# Patient Record
Sex: FEMALE | Race: WHITE | NOT HISPANIC OR LATINO | Employment: OTHER | URBAN - METROPOLITAN AREA
[De-identification: names, ages, dates, MRNs, and addresses within clinical notes are randomized per-mention and may not be internally consistent; named-entity substitution may affect disease eponyms.]

---

## 2017-04-06 ENCOUNTER — HOSPITAL ENCOUNTER (EMERGENCY)
Facility: MEDICAL CENTER | Age: 47
End: 2017-04-06
Payer: MEDICAID

## 2017-04-06 VITALS
SYSTOLIC BLOOD PRESSURE: 119 MMHG | RESPIRATION RATE: 20 BRPM | DIASTOLIC BLOOD PRESSURE: 98 MMHG | HEIGHT: 63 IN | OXYGEN SATURATION: 98 % | TEMPERATURE: 98.1 F | WEIGHT: 131.84 LBS | HEART RATE: 91 BPM | BODY MASS INDEX: 23.36 KG/M2

## 2017-04-06 LAB — EKG IMPRESSION: NORMAL

## 2017-04-06 PROCEDURE — 302449 STATCHG TRIAGE ONLY (STATISTIC)

## 2017-04-06 PROCEDURE — 93005 ELECTROCARDIOGRAM TRACING: CPT

## 2017-04-06 ASSESSMENT — PAIN SCALES - GENERAL: PAINLEVEL_OUTOF10: 4

## 2017-04-06 NOTE — ED NOTES
"Mary Martinez  46 y.o.  Chief Complaint   Patient presents with   • Chest Pain     since yesterday. Pt was sleeping when the pain came on, pt states \"it feels like needles jabbing at my chest and arm\"      Pt taken back for EKG  "

## 2017-04-07 DIAGNOSIS — Z01.812 PRE-OPERATIVE LABORATORY EXAMINATION: ICD-10-CM

## 2017-04-07 LAB
APPEARANCE UR: CLEAR
BILIRUB UR QL STRIP.AUTO: NEGATIVE
COLOR UR: NORMAL
CULTURE IF INDICATED INDCX: NO UA CULTURE
GLUCOSE UR STRIP.AUTO-MCNC: NEGATIVE MG/DL
KETONES UR STRIP.AUTO-MCNC: NEGATIVE MG/DL
LEUKOCYTE ESTERASE UR QL STRIP.AUTO: NEGATIVE
MICRO URNS: NORMAL
NITRITE UR QL STRIP.AUTO: NEGATIVE
PH UR STRIP.AUTO: 6.5 [PH]
PROT UR QL STRIP: NEGATIVE MG/DL
RBC UR QL AUTO: NEGATIVE
SP GR UR STRIP.AUTO: 1.01

## 2017-04-07 PROCEDURE — 81003 URINALYSIS AUTO W/O SCOPE: CPT

## 2017-04-07 RX ORDER — OXYCODONE AND ACETAMINOPHEN 7.5; 325 MG/1; MG/1
1 TABLET ORAL EVERY 4 HOURS PRN
Status: ON HOLD | COMMUNITY
End: 2017-06-05

## 2017-04-07 RX ORDER — ZOLPIDEM TARTRATE 5 MG/1
5 TABLET ORAL NIGHTLY PRN
COMMUNITY
End: 2018-06-08

## 2017-04-17 ENCOUNTER — HOSPITAL ENCOUNTER (OUTPATIENT)
Facility: MEDICAL CENTER | Age: 47
End: 2017-04-17
Attending: ORTHOPAEDIC SURGERY | Admitting: ORTHOPAEDIC SURGERY
Payer: MEDICAID

## 2017-04-17 VITALS
WEIGHT: 125 LBS | TEMPERATURE: 98.4 F | SYSTOLIC BLOOD PRESSURE: 100 MMHG | HEART RATE: 102 BPM | HEIGHT: 63 IN | RESPIRATION RATE: 16 BRPM | DIASTOLIC BLOOD PRESSURE: 77 MMHG | OXYGEN SATURATION: 96 % | BODY MASS INDEX: 22.15 KG/M2

## 2017-04-17 PROBLEM — M06.9 RA (RHEUMATOID ARTHRITIS) (HCC): Status: ACTIVE | Noted: 2017-04-17

## 2017-04-17 PROCEDURE — A9270 NON-COVERED ITEM OR SERVICE: HCPCS

## 2017-04-17 PROCEDURE — 500126 HCHG BOVIE, NEEDLE TIP: Performed by: ORTHOPAEDIC SURGERY

## 2017-04-17 PROCEDURE — 160035 HCHG PACU - 1ST 60 MINS PHASE I: Performed by: ORTHOPAEDIC SURGERY

## 2017-04-17 PROCEDURE — 160048 HCHG OR STATISTICAL LEVEL 1-5: Performed by: ORTHOPAEDIC SURGERY

## 2017-04-17 PROCEDURE — 110382 HCHG SHELL REV 271: Performed by: ORTHOPAEDIC SURGERY

## 2017-04-17 PROCEDURE — 700111 HCHG RX REV CODE 636 W/ 250 OVERRIDE (IP)

## 2017-04-17 PROCEDURE — 500127 HCHG BOVIE, NEEDLE TIP 1: Performed by: ORTHOPAEDIC SURGERY

## 2017-04-17 PROCEDURE — 500137 HCHG BURR, CUTTING DISP: Performed by: ORTHOPAEDIC SURGERY

## 2017-04-17 PROCEDURE — 160009 HCHG ANES TIME/MIN: Performed by: ORTHOPAEDIC SURGERY

## 2017-04-17 PROCEDURE — 501480 HCHG STOCKINETTE: Performed by: ORTHOPAEDIC SURGERY

## 2017-04-17 PROCEDURE — 700102 HCHG RX REV CODE 250 W/ 637 OVERRIDE(OP)

## 2017-04-17 PROCEDURE — 160028 HCHG SURGERY MINUTES - 1ST 30 MINS LEVEL 3: Performed by: ORTHOPAEDIC SURGERY

## 2017-04-17 PROCEDURE — 500088 HCHG BLADE, SAGITTAL: Performed by: ORTHOPAEDIC SURGERY

## 2017-04-17 PROCEDURE — 501745 HCHG WIRE, SURGICAL STEEL: Performed by: ORTHOPAEDIC SURGERY

## 2017-04-17 PROCEDURE — 160036 HCHG PACU - EA ADDL 30 MINS PHASE I: Performed by: ORTHOPAEDIC SURGERY

## 2017-04-17 PROCEDURE — 500881 HCHG PACK, EXTREMITY: Performed by: ORTHOPAEDIC SURGERY

## 2017-04-17 PROCEDURE — A6223 GAUZE >16<=48 NO W/SAL W/O B: HCPCS | Performed by: ORTHOPAEDIC SURGERY

## 2017-04-17 PROCEDURE — 501838 HCHG SUTURE GENERAL: Performed by: ORTHOPAEDIC SURGERY

## 2017-04-17 PROCEDURE — 502240 HCHG MISC OR SUPPLY RC 0272: Performed by: ORTHOPAEDIC SURGERY

## 2017-04-17 PROCEDURE — 501736 HCHG WIRE, K-5M DBL END: Performed by: ORTHOPAEDIC SURGERY

## 2017-04-17 PROCEDURE — 700101 HCHG RX REV CODE 250

## 2017-04-17 PROCEDURE — 88304 TISSUE EXAM BY PATHOLOGIST: CPT

## 2017-04-17 PROCEDURE — A4606 OXYGEN PROBE USED W OXIMETER: HCPCS | Performed by: ORTHOPAEDIC SURGERY

## 2017-04-17 PROCEDURE — 110371 HCHG SHELL REV 272: Performed by: ORTHOPAEDIC SURGERY

## 2017-04-17 PROCEDURE — 160002 HCHG RECOVERY MINUTES (STAT): Performed by: ORTHOPAEDIC SURGERY

## 2017-04-17 PROCEDURE — 88311 DECALCIFY TISSUE: CPT

## 2017-04-17 PROCEDURE — 160039 HCHG SURGERY MINUTES - EA ADDL 1 MIN LEVEL 3: Performed by: ORTHOPAEDIC SURGERY

## 2017-04-17 DEVICE — IMPLANTABLE DEVICE: Type: IMPLANTABLE DEVICE | Status: FUNCTIONAL

## 2017-04-17 DEVICE — WIRE K- SMOOTH .045 - (3TX6=18): Type: IMPLANTABLE DEVICE | Status: FUNCTIONAL

## 2017-04-17 RX ORDER — DIPHENHYDRAMINE HYDROCHLORIDE 50 MG/ML
25 INJECTION INTRAMUSCULAR; INTRAVENOUS EVERY 6 HOURS PRN
Status: DISCONTINUED | OUTPATIENT
Start: 2017-04-17 | End: 2017-04-17 | Stop reason: HOSPADM

## 2017-04-17 RX ORDER — DEXAMETHASONE SODIUM PHOSPHATE 4 MG/ML
4 INJECTION, SOLUTION INTRA-ARTICULAR; INTRALESIONAL; INTRAMUSCULAR; INTRAVENOUS; SOFT TISSUE
Status: DISCONTINUED | OUTPATIENT
Start: 2017-04-17 | End: 2017-04-17 | Stop reason: HOSPADM

## 2017-04-17 RX ORDER — BUPIVACAINE HYDROCHLORIDE AND EPINEPHRINE 2.5; 5 MG/ML; UG/ML
INJECTION, SOLUTION EPIDURAL; INFILTRATION; INTRACAUDAL; PERINEURAL
Status: DISCONTINUED
Start: 2017-04-17 | End: 2017-04-17 | Stop reason: HOSPADM

## 2017-04-17 RX ORDER — ONDANSETRON 2 MG/ML
INJECTION INTRAMUSCULAR; INTRAVENOUS
Status: DISCONTINUED
Start: 2017-04-17 | End: 2017-04-17 | Stop reason: HOSPADM

## 2017-04-17 RX ORDER — BACITRACIN 50000 [IU]/1
INJECTION, POWDER, FOR SOLUTION INTRAMUSCULAR
Status: DISCONTINUED | OUTPATIENT
Start: 2017-04-17 | End: 2017-04-17 | Stop reason: HOSPADM

## 2017-04-17 RX ORDER — CELECOXIB 200 MG/1
CAPSULE ORAL
Status: COMPLETED
Start: 2017-04-17 | End: 2017-04-17

## 2017-04-17 RX ORDER — ACETAMINOPHEN 500 MG
TABLET ORAL
Status: COMPLETED
Start: 2017-04-17 | End: 2017-04-17

## 2017-04-17 RX ORDER — MIDAZOLAM HYDROCHLORIDE 1 MG/ML
INJECTION INTRAMUSCULAR; INTRAVENOUS
Status: DISCONTINUED
Start: 2017-04-17 | End: 2017-04-17 | Stop reason: HOSPADM

## 2017-04-17 RX ORDER — GABAPENTIN 300 MG/1
CAPSULE ORAL
Status: COMPLETED
Start: 2017-04-17 | End: 2017-04-17

## 2017-04-17 RX ORDER — SODIUM CHLORIDE, SODIUM LACTATE, POTASSIUM CHLORIDE, CALCIUM CHLORIDE 600; 310; 30; 20 MG/100ML; MG/100ML; MG/100ML; MG/100ML
1000 INJECTION, SOLUTION INTRAVENOUS
Status: COMPLETED | OUTPATIENT
Start: 2017-04-17 | End: 2017-04-17

## 2017-04-17 RX ORDER — OXYCODONE HCL 20 MG/1
TABLET, FILM COATED, EXTENDED RELEASE ORAL
Status: COMPLETED
Start: 2017-04-17 | End: 2017-04-17

## 2017-04-17 RX ORDER — BUPIVACAINE HYDROCHLORIDE AND EPINEPHRINE 2.5; 5 MG/ML; UG/ML
INJECTION, SOLUTION EPIDURAL; INFILTRATION; INTRACAUDAL; PERINEURAL
Status: DISCONTINUED | OUTPATIENT
Start: 2017-04-17 | End: 2017-04-17 | Stop reason: HOSPADM

## 2017-04-17 RX ORDER — HALOPERIDOL 5 MG/ML
1 INJECTION INTRAMUSCULAR EVERY 6 HOURS PRN
Status: DISCONTINUED | OUTPATIENT
Start: 2017-04-17 | End: 2017-04-17 | Stop reason: HOSPADM

## 2017-04-17 RX ORDER — ONDANSETRON 2 MG/ML
4 INJECTION INTRAMUSCULAR; INTRAVENOUS EVERY 4 HOURS PRN
Status: DISCONTINUED | OUTPATIENT
Start: 2017-04-17 | End: 2017-04-17 | Stop reason: HOSPADM

## 2017-04-17 RX ORDER — BACITRACIN 50000 [IU]/1
INJECTION, POWDER, FOR SOLUTION INTRAMUSCULAR
Status: DISCONTINUED
Start: 2017-04-17 | End: 2017-04-17 | Stop reason: HOSPADM

## 2017-04-17 RX ORDER — SCOLOPAMINE TRANSDERMAL SYSTEM 1 MG/1
1 PATCH, EXTENDED RELEASE TRANSDERMAL
Status: DISCONTINUED | OUTPATIENT
Start: 2017-04-17 | End: 2017-04-17 | Stop reason: HOSPADM

## 2017-04-17 RX ADMIN — CELECOXIB 400 MG: 200 CAPSULE ORAL at 10:27

## 2017-04-17 RX ADMIN — ACETAMINOPHEN 500 MG: 500 TABLET, FILM COATED ORAL at 10:27

## 2017-04-17 RX ADMIN — GABAPENTIN 600 MG: 300 CAPSULE ORAL at 10:27

## 2017-04-17 RX ADMIN — OXYCODONE HYDROCHLORIDE 20 MG: 20 TABLET, FILM COATED, EXTENDED RELEASE ORAL at 10:30

## 2017-04-17 RX ADMIN — ONDANSETRON 4 MG: 2 INJECTION INTRAMUSCULAR; INTRAVENOUS at 13:18

## 2017-04-17 RX ADMIN — SODIUM CHLORIDE, SODIUM LACTATE, POTASSIUM CHLORIDE, CALCIUM CHLORIDE 1000 ML: 600; 310; 30; 20 INJECTION, SOLUTION INTRAVENOUS at 09:15

## 2017-04-17 ASSESSMENT — PAIN SCALES - GENERAL
PAINLEVEL_OUTOF10: 0
PAINLEVEL_OUTOF10: 3
PAINLEVEL_OUTOF10: 0

## 2017-04-17 NOTE — OR SURGEON
Immediate Post-Operative Note      PreOp Diagnosis: severe RA    PostOp Diagnosis: same    Procedure(s):  FINGER ARTHROPLASTY - LONG, RING AND SMALL METACARPOPHALANGEAL IMPLANT - Wound Class: Clean    Surgeon(s):  Lobo Rosen M.D.    Anesthesiologist/Type of Anesthesia:  Anesthesiologist: Moe Toledo M.D./General    Surgical Staff:  Circulator: Jamaica Baker R.N.  Relief Circulator: Leah Cifuentes R.N.  Scrub Person: Anitra Colbert; Marianna Jennings; Jerilyn Cadena    Specimen: yes    Estimated Blood Loss: 0    Findings: 0    Complications: 0        4/17/2017 1:08 PM Lobo Rosen

## 2017-04-17 NOTE — DISCHARGE INSTRUCTIONS
ACTIVITY: Rest and take it easy for the first 24 hours.  A responsible adult is recommended to remain with you during that time.  It is normal to feel sleepy.  We encourage you to not do anything that requires balance, judgment or coordination.    MILD FLU-LIKE SYMPTOMS ARE NORMAL. YOU MAY EXPERIENCE GENERALIZED MUSCLE ACHES, THROAT IRRITATION, HEADACHE AND/OR SOME NAUSEA.    FOR 24 HOURS DO NOT:  Drive, operate machinery or run household appliances.  Drink beer or alcoholic beverages.   Make important decisions or sign legal documents.    SPECIAL INSTRUCTIONS: follow Dr. Rosen's instructions.  See Hand Surgery instruction sheet provided with discharge instructions.      DIET: To avoid nausea, slowly advance diet as tolerated, avoiding spicy or greasy foods for the first day.  Add more substantial food to your diet according to your physician's instructions.  Babies can be fed formula or breast milk as soon as they are hungry.  INCREASE FLUIDS AND FIBER TO AVOID CONSTIPATION.    SURGICAL DRESSING/BATHING: Keep hand dressing clean, and dry and in place until instructed to remove by MD. Keep hand elevated with pillows.     FOLLOW-UP APPOINTMENT:  A follow-up appointment should be arranged with your doctor in 2 weeks; call to schedule.    You should CALL YOUR PHYSICIAN if you develop:  Fever greater than 101 degrees F.  Pain not relieved by medication, or persistent nausea or vomiting.  Excessive bleeding (blood soaking through dressing) or unexpected drainage from the wound.  Extreme redness or swelling around the incision site, drainage of pus or foul smelling drainage.  Inability to urinate or empty your bladder within 8 hours.  Problems with breathing or chest pain.    You should call 911 if you develop problems with breathing or chest pain.  If you are unable to contact your doctor or surgical center, you should go to the nearest emergency room or urgent care center.  Physician's telephone #:  [phone]    If any questions arise, call your doctor.  If your doctor is not available, please feel free to call the Surgical Center at [phone].  The Center is open Monday through Friday from 7AM to 7PM.  You can also call the HEALTH HOTLINE open 24 hours/day, 7 days/week and speak to a nurse at [phone], or toll free at [phone].    A registered nurse may call you a few days after your surgery to see how you are doing after your procedure.    MEDICATIONS: Resume taking daily medication.  Take prescribed pain medication with food.  If no medication is prescribed, you may take non-aspirin pain medication if needed.  PAIN MEDICATION CAN BE VERY CONSTIPATING.  Take a stool softener or laxative such as senokot, pericolace, or milk of magnesia if needed.    Prescription given for at home.  Last pain medication given at take first dose pain medication when sensation returns and you feel mild pain to right arm.    If your physician has prescribed pain medication that includes Acetaminophen (Tylenol), do not take additional Acetaminophen (Tylenol) while taking the prescribed medication.    Depression / Suicide Risk    As you are discharged from this [facility] facility, it is important to learn how to keep safe from harming yourself.    Recognize the warning signs:  · Abrupt changes in personality, positive or negative- including increase in energy   · Giving away possessions  · Change in eating patterns- significant weight changes-  positive or negative  · Change in sleeping patterns- unable to sleep or sleeping all the time   · Unwillingness or inability to communicate  · Depression  · Unusual sadness, discouragement and loneliness  · Talk of wanting to die  · Neglect of personal appearance   · Rebelliousness- reckless behavior  · Withdrawal from people/activities they love  · Confusion- inability to concentrate     If you or a loved one observes any of these behaviors or has concerns about self-harm,  here's what you can do:  · Talk about it- your feelings and reasons for harming yourself  · Remove any means that you might use to hurt yourself (examples: pills, rope, extension cords, firearm)  · Get professional help from the community (Mental Health, Substance Abuse, psychological counseling)  · Do not be alone:Call your Safe Contact- someone whom you trust who will be there for you.  · Call your local CRISIS HOTLINE 459-3853 or 230-106-2316  · Call your local Children's Mobile Crisis Response Team [place] (496) 074-6822 or www.[employer]  · Call the toll free National Suicide Prevention Hotlines   · National Suicide Prevention Lifeline 271-687-ZDFO (8566)  · National Hope Line Network 800-SUICIDE (820-6234)

## 2017-04-17 NOTE — IP AVS SNAPSHOT
4/17/2017    Mary Martinez  [address] [address]    Dear Mary:    [facility] wants to ensure your discharge home is safe and you or your loved ones have had all of your questions answered regarding your care after you leave the hospital.    Below is a list of resources and contact information should you have any questions regarding your hospital stay, follow-up instructions, or active medical symptoms.    Questions or Concerns Regarding… Contact   Medical Questions Related to Your Discharge  (7 days a week, 8am-5pm) Contact a Nurse Care Coordinator   [phone]   Medical Questions Not Related to Your Discharge  (24 hours a day / 7 days a week)  Contact the Nurse Health Line   [phone]    Medications or Discharge Instructions Refer to your discharge packet   or contact your [facility] Primary Care Provider   [phone]   Follow-up Appointment(s) Schedule your appointment via [employer]   or contact Scheduling [phone]   Billing Review your statement via [employer]  or contact Billing [phone]   Medical Records Review your records via [employer]   or contact Medical Records [phone]     You may receive a telephone call within two days of discharge. This call is to make certain you understand your discharge instructions and have the opportunity to have any questions answered. You can also easily access your medical information, test results and upcoming appointments via the [employer] free online health management tool. You can learn more and sign up at [employer]. For assistance setting up your [employer] account, please call [phone].    Once again, we want to ensure your discharge home is safe and that you have a clear understanding of any next steps in your care. If you have any questions or concerns, please do not hesitate to contact us, we are here for you. Thank you for choosing [facility] for your healthcare needs.    Sincerely,    Your [facility] Healthcare Team

## 2017-04-17 NOTE — IP AVS SNAPSHOT
" Home Care Instructions                                                                                                                Name:Mary Martinez  Medical Record Number:[id number]  CSN: [id number]    YOB: 1970   Age: 46 y.o.  Sex: female  HT:1.6 m (5' 3\") WT: 56.7 kg (125 lb)          Admit Date: 4/17/2017     Discharge Date:   Today's Date: 4/17/2017  Attending Doctor:  Lobo Rosen M.D.                  Allergies:  Nitrous oxide and Penicillins                Discharge Instructions         ACTIVITY: Rest and take it easy for the first 24 hours.  A responsible adult is recommended to remain with you during that time.  It is normal to feel sleepy.  We encourage you to not do anything that requires balance, judgment or coordination.    MILD FLU-LIKE SYMPTOMS ARE NORMAL. YOU MAY EXPERIENCE GENERALIZED MUSCLE ACHES, THROAT IRRITATION, HEADACHE AND/OR SOME NAUSEA.    FOR 24 HOURS DO NOT:  Drive, operate machinery or run household appliances.  Drink beer or alcoholic beverages.   Make important decisions or sign legal documents.    SPECIAL INSTRUCTIONS: follow Dr. Rosen's instructions.  See Hand Surgery instruction sheet provided with discharge instructions.      DIET: To avoid nausea, slowly advance diet as tolerated, avoiding spicy or greasy foods for the first day.  Add more substantial food to your diet according to your physician's instructions.  Babies can be fed formula or breast milk as soon as they are hungry.  INCREASE FLUIDS AND FIBER TO AVOID CONSTIPATION.    SURGICAL DRESSING/BATHING: Keep hand dressing clean, and dry and in place until instructed to remove by MD. Keep hand elevated with pillows.     FOLLOW-UP APPOINTMENT:  A follow-up appointment should be arranged with your doctor in 2 weeks; call to schedule.    You should CALL YOUR PHYSICIAN if you develop:  Fever greater than 101 degrees F.  Pain not relieved by medication, or persistent nausea or " vomiting.  Excessive bleeding (blood soaking through dressing) or unexpected drainage from the wound.  Extreme redness or swelling around the incision site, drainage of pus or foul smelling drainage.  Inability to urinate or empty your bladder within 8 hours.  Problems with breathing or chest pain.    You should call 911 if you develop problems with breathing or chest pain.  If you are unable to contact your doctor or surgical center, you should go to the nearest emergency room or urgent care center.  Physician's telephone #: [phone]    If any questions arise, call your doctor.  If your doctor is not available, please feel free to call the Surgical Center at [phone].  The Center is open Monday through Friday from 7AM to 7PM.  You can also call the [employer] HOTLINE open 24 hours/day, 7 days/week and speak to a nurse at [phone], or toll free at [phone].    A registered nurse may call you a few days after your surgery to see how you are doing after your procedure.    MEDICATIONS: Resume taking daily medication.  Take prescribed pain medication with food.  If no medication is prescribed, you may take non-aspirin pain medication if needed.  PAIN MEDICATION CAN BE VERY CONSTIPATING.  Take a stool softener or laxative such as senokot, pericolace, or milk of magnesia if needed.    Prescription given for at home.  Last pain medication given at take first dose pain medication when sensation returns and you feel mild pain to right arm.    If your physician has prescribed pain medication that includes Acetaminophen (Tylenol), do not take additional Acetaminophen (Tylenol) while taking the prescribed medication.    Depression / Suicide Risk    As you are discharged from this [facility] facility, it is important to learn how to keep safe from harming yourself.    Recognize the warning signs:  · Abrupt changes in personality, positive or negative- including increase in energy   · Giving away  possessions  · Change in eating patterns- significant weight changes-  positive or negative  · Change in sleeping patterns- unable to sleep or sleeping all the time   · Unwillingness or inability to communicate  · Depression  · Unusual sadness, discouragement and loneliness  · Talk of wanting to die  · Neglect of personal appearance   · Rebelliousness- reckless behavior  · Withdrawal from people/activities they love  · Confusion- inability to concentrate     If you or a loved one observes any of these behaviors or has concerns about self-harm, here's what you can do:  · Talk about it- your feelings and reasons for harming yourself  · Remove any means that you might use to hurt yourself (examples: pills, rope, extension cords, firearm)  · Get professional help from the community (Mental Health, Substance Abuse, psychological counseling)  · Do not be alone:Call your Safe Contact- someone whom you trust who will be there for you.  · Call your local CRISIS HOTLINE 438-6159 or 843-998-6289  · Call your local Children's Mobile Crisis Response Team [place] (150) 705-4831 or [employer]  · Call the toll free National Suicide Prevention Hotlines   · National Suicide Prevention Lifeline 908-193-USHG (4540)  · National Hope Line Network 800-SUICIDE (535-5590)       Medication List      CONTINUE taking these medications        Instructions    Morning Afternoon Evening Bedtime    albuterol 108 (90 BASE) MCG/ACT Aers inhalation aerosol        Inhale 2 Puffs by mouth as needed for Shortness of Breath.   Dose:  2 Puff                        cyclobenzaprine 10 MG Tabs   Commonly known as:  FLEXERIL        Take 10 mg by mouth every bedtime.   Dose:  10 mg                        ENBREL 50 MG/ML Sosy   Generic drug:  Etanercept        Inject  as instructed every 7 days.                        meloxicam 7.5 MG Tabs   Commonly known as:  MOBIC        Take 7.5 mg by mouth every bedtime.   Dose:  7.5 mg                         oxycodone-acetaminophen 7.5-325 MG per tablet   Commonly known as:  PERCOCET        Take 1 Tab by mouth every four hours as needed.   Dose:  1 Tab                        paroxetine 20 MG Tabs   Commonly known as:  PAXIL        Take 20 mg by mouth every bedtime.   Dose:  20 mg                        zolpidem 5 MG Tabs   Commonly known as:  AMBIEN        Take 5 mg by mouth at bedtime as needed for Sleep.   Dose:  5 mg                                Medication Information     Above and/or attached are the medications your physician expects you to take upon discharge. Review all of your home medications and newly ordered medications with your doctor and/or pharmacist. Follow medication instructions as directed by your doctor and/or pharmacist. Please keep your medication list with you and share with your physician. Update the information when medications are discontinued, doses are changed, or new medications (including over-the-counter products) are added; and carry medication information at all times in the event of emergency situations.        Resources     Quit Smoking / Tobacco Use:    I understand the use of any tobacco products increases my chance of suffering from future heart disease or stroke and could cause other illnesses which may shorten my life. Quitting the use of tobacco products is the single most important thing I can do to improve my health. For further information on smoking / tobacco cessation call a Toll Free Quit Line at 1-577.408.2454 (*National [employer] [place]) or 1-490.996.4672 (American Lung Association) or you can access the web based program at www.lungusa.org.    [place] Tobacco Users Help Line:  (984) 185-8683       Toll Free: 1-639.340.6786  Quit Tobacco Program [facility] (158)692-6156    Crisis Hotline:    [place] Crisis Hotline:  0-001-TQBYMGX or 1-421.784.4787    [place] Crisis Hotline:    1-623.120.7425 or 327-238-6977    Discharge Survey:   Thank you for  choosing [facility]. We hope we did everything we could to make your hospital stay a pleasant one. You may be receiving a survey and we would appreciate your time and participation in answering the questions. Your input is very valuable to us in our efforts to improve our service to our patients and their families.            Signatures     My signature on this form indicates that:    1. I acknowledge receipt and understanding of these Home Care Instruction.  2. My questions regarding this information have been answered to my satisfaction.  3. I have formulated a plan with my discharge nurse to obtain my prescribed medications for home.    __________________________________      __________________________________                   Patient Signature                                 Guardian/Responsible Adult Signature      __________________________________                 __________       ________                       Nurse Signature                                               Date                 Time

## 2017-04-17 NOTE — OR NURSING
1302: report received from Dr. Toledo, To PACU from OR via [place], respirations spontaneous and non-labored, patient denies pain or nausea. Right arm with gauze dressing to just below elbow. Thumb and first two fingers exposed with brisk capillary refill, warm and dry.   1310: patients mother to PACU. Pt. Tolerating sips of water.   1318: pt co nausea. Medicated, see mar.  1325: patient states relief of nausea. Operative hand elevated on pillow. Patient denies pain. No change in surgical site assessment.  1340: tolerating juice. Denies pain or nausea.  1355: No change in surgical site assessment.  1404: discharge instructions reviewed with patient and her mother who verbalized understanding. Assisted with dressing and patient discharged with all personal belongings. No change to surgical site, patient refused arm sling.

## 2017-04-19 NOTE — OP REPORT
DATE OF SERVICE:  04/17/2017    PREOPERATIVE DIAGNOSES:  Severe rheumatoid deformity with a prior amputation   of the right index finger and a 90-degree contracture of the right long, ring,   and small metacarpophalangeal joints and a 90-degree contracture of the right   long, ring, and small proximal interphalangeal joint secondary to rheumatoid   arthritis.    POSTOPERATIVE DIAGNOSES:  Severe rheumatoid deformity with a prior amputation   of the right index finger and a 90-degree contracture of the right long, ring,   and small metacarpophalangeal joints and a 90-degree contracture of the right   long, ring, and small proximal interphalangeal joint secondary to rheumatoid   arthritis.    PROCEDURE:  Right long, ring, and small PIP arthrodesis.    Patient was seen preoperatively.  I had initially scheduled her for MP   arthroplasties and PIP fusion; however, because of the severe deformity   present and the fact that I wanted to make sure her PIP joints fused before   she undertook motion and her MP joints would need to move early, I did not   think doing PIP fusions and MP arthroplasties at the same time was in her best   interest.  Therefore, we put off the arthroplasties until we can get the PIP   joints to fuse.    ANESTHESIA:  General.    SURGEON:  Lobo Rosen MD    OPERATIVE PROCEDURE:  Patient taken to the operating room following induction   of general anesthesia, right upper extremity was prepped and draped in routine   fashion.  Limb was exsanguinated with an elastic bandage.  The tourniquet   inflated to 250 mmHg.  Right long finger was exposed through a dorsal   longitudinal incision centered over the PIP joint.  Sharp dissection carried   down through the skin and subcutaneous tissue.  Extensor mechanism was split   longitudinally.  Subperiosteal dissection was used to expose the PIP joint.  A   sagittal saw was used to resect the end of the proximal phalanx and end of   the middle phalanx and  an arthrodesis was carried out with a smooth Laura   wire and a tension band wire, getting rigid fixation in a mildly flexed   position.  Similar procedure was done on the long, ring, and small fingers.    All 3 fingers were fused in about 30 degrees of flexion.  Preoperatively, they   were ankylosed in 90 degrees of flexion and the patient's goal is to get her   fingers out of her palm so she can hold on to something.  The procedure went   well on the long, ring, and small fingers.  Once the fusions were undertaken,   the fingers were blocked with 0.5% Marcaine, tourniquet released, hemostasis   attained with electrocautery, wounds irrigated copiously.  The extensor   mechanism was closed over the figure-of-eight wire with 3-0 Vicryl.  The skin   was closed with 4-0 nylon.  Fingers were blocked with 0.5% Marcaine.  The   patient was then immobilized in a compressive dressing and splint.  The arm   was elevated.  Fingers pinked up nicely and the patient was taken to recovery   room in satisfactory condition.       ____________________________________     MD SOLOMON SPICER / NOLA    DD:  04/17/2017 19:08:40  DT:  04/17/2017 20:15:32    D#:  [id number]  Job#:  [id number]

## 2017-06-05 ENCOUNTER — APPOINTMENT (OUTPATIENT)
Dept: RADIOLOGY | Facility: MEDICAL CENTER | Age: 47
End: 2017-06-05
Attending: ORTHOPAEDIC SURGERY
Payer: MEDICAID

## 2017-06-05 ENCOUNTER — HOSPITAL ENCOUNTER (OUTPATIENT)
Facility: MEDICAL CENTER | Age: 47
End: 2017-06-05
Attending: ORTHOPAEDIC SURGERY | Admitting: ORTHOPAEDIC SURGERY
Payer: MEDICAID

## 2017-06-05 VITALS
TEMPERATURE: 98.9 F | WEIGHT: 120.59 LBS | SYSTOLIC BLOOD PRESSURE: 93 MMHG | BODY MASS INDEX: 21.37 KG/M2 | HEIGHT: 63 IN | RESPIRATION RATE: 16 BRPM | OXYGEN SATURATION: 94 % | HEART RATE: 76 BPM | DIASTOLIC BLOOD PRESSURE: 66 MMHG

## 2017-06-05 PROBLEM — I01.1 RHEUMATOID AORTITIS: Status: ACTIVE | Noted: 2017-06-05

## 2017-06-05 PROBLEM — M25.649 JOINT STIFFNESS OF HAND: Status: ACTIVE | Noted: 2017-06-05

## 2017-06-05 PROCEDURE — 88311 DECALCIFY TISSUE: CPT

## 2017-06-05 PROCEDURE — 160028 HCHG SURGERY MINUTES - 1ST 30 MINS LEVEL 3: Performed by: ORTHOPAEDIC SURGERY

## 2017-06-05 PROCEDURE — 700111 HCHG RX REV CODE 636 W/ 250 OVERRIDE (IP)

## 2017-06-05 PROCEDURE — 700101 HCHG RX REV CODE 250

## 2017-06-05 PROCEDURE — 501838 HCHG SUTURE GENERAL: Performed by: ORTHOPAEDIC SURGERY

## 2017-06-05 PROCEDURE — 160009 HCHG ANES TIME/MIN: Performed by: ORTHOPAEDIC SURGERY

## 2017-06-05 PROCEDURE — 160036 HCHG PACU - EA ADDL 30 MINS PHASE I: Performed by: ORTHOPAEDIC SURGERY

## 2017-06-05 PROCEDURE — 501480 HCHG STOCKINETTE: Performed by: ORTHOPAEDIC SURGERY

## 2017-06-05 PROCEDURE — A6223 GAUZE >16<=48 NO W/SAL W/O B: HCPCS | Performed by: ORTHOPAEDIC SURGERY

## 2017-06-05 PROCEDURE — 500088 HCHG BLADE, SAGITTAL: Performed by: ORTHOPAEDIC SURGERY

## 2017-06-05 PROCEDURE — 88305 TISSUE EXAM BY PATHOLOGIST: CPT

## 2017-06-05 PROCEDURE — 160035 HCHG PACU - 1ST 60 MINS PHASE I: Performed by: ORTHOPAEDIC SURGERY

## 2017-06-05 PROCEDURE — 500127 HCHG BOVIE, NEEDLE TIP 1: Performed by: ORTHOPAEDIC SURGERY

## 2017-06-05 PROCEDURE — 502240 HCHG MISC OR SUPPLY RC 0272: Performed by: ORTHOPAEDIC SURGERY

## 2017-06-05 PROCEDURE — 160039 HCHG SURGERY MINUTES - EA ADDL 1 MIN LEVEL 3: Performed by: ORTHOPAEDIC SURGERY

## 2017-06-05 PROCEDURE — 500126 HCHG BOVIE, NEEDLE TIP: Performed by: ORTHOPAEDIC SURGERY

## 2017-06-05 PROCEDURE — 160048 HCHG OR STATISTICAL LEVEL 1-5: Performed by: ORTHOPAEDIC SURGERY

## 2017-06-05 PROCEDURE — 73140 X-RAY EXAM OF FINGER(S): CPT | Mod: RT

## 2017-06-05 PROCEDURE — 501736 HCHG WIRE, K-5M DBL END: Performed by: ORTHOPAEDIC SURGERY

## 2017-06-05 PROCEDURE — 500881 HCHG PACK, EXTREMITY: Performed by: ORTHOPAEDIC SURGERY

## 2017-06-05 PROCEDURE — 160002 HCHG RECOVERY MINUTES (STAT): Performed by: ORTHOPAEDIC SURGERY

## 2017-06-05 DEVICE — WIRE K- SMTH .045 4 (6TX6=36) (6EA/PK): Type: IMPLANTABLE DEVICE | Status: FUNCTIONAL

## 2017-06-05 RX ORDER — OXYCODONE AND ACETAMINOPHEN 10; 325 MG/1; MG/1
1 TABLET ORAL EVERY 4 HOURS PRN
COMMUNITY
End: 2018-06-08

## 2017-06-05 RX ORDER — SODIUM CHLORIDE, SODIUM LACTATE, POTASSIUM CHLORIDE, CALCIUM CHLORIDE 600; 310; 30; 20 MG/100ML; MG/100ML; MG/100ML; MG/100ML
1000 INJECTION, SOLUTION INTRAVENOUS
Status: COMPLETED | OUTPATIENT
Start: 2017-06-05 | End: 2017-06-05

## 2017-06-05 RX ORDER — BACITRACIN 50000 [IU]/1
INJECTION, POWDER, FOR SOLUTION INTRAMUSCULAR
Status: DISCONTINUED | OUTPATIENT
Start: 2017-06-05 | End: 2017-06-05 | Stop reason: HOSPADM

## 2017-06-05 RX ORDER — BACITRACIN 50000 [IU]/1
INJECTION, POWDER, FOR SOLUTION INTRAMUSCULAR
Status: DISCONTINUED
Start: 2017-06-05 | End: 2017-06-05 | Stop reason: HOSPADM

## 2017-06-05 RX ORDER — DIPHENHYDRAMINE HYDROCHLORIDE 50 MG/ML
25 INJECTION INTRAMUSCULAR; INTRAVENOUS EVERY 6 HOURS PRN
Status: DISCONTINUED | OUTPATIENT
Start: 2017-06-05 | End: 2017-06-05 | Stop reason: HOSPADM

## 2017-06-05 RX ORDER — DIPHENHYDRAMINE HYDROCHLORIDE 50 MG/ML
INJECTION INTRAMUSCULAR; INTRAVENOUS
Status: COMPLETED
Start: 2017-06-05 | End: 2017-06-05

## 2017-06-05 RX ORDER — LIDOCAINE HYDROCHLORIDE 10 MG/ML
INJECTION, SOLUTION INFILTRATION; PERINEURAL
Status: COMPLETED
Start: 2017-06-05 | End: 2017-06-05

## 2017-06-05 RX ORDER — HALOPERIDOL 5 MG/ML
1 INJECTION INTRAMUSCULAR EVERY 6 HOURS PRN
Status: DISCONTINUED | OUTPATIENT
Start: 2017-06-05 | End: 2017-06-05 | Stop reason: HOSPADM

## 2017-06-05 RX ORDER — ONDANSETRON 2 MG/ML
4 INJECTION INTRAMUSCULAR; INTRAVENOUS EVERY 4 HOURS PRN
Status: DISCONTINUED | OUTPATIENT
Start: 2017-06-05 | End: 2017-06-05 | Stop reason: HOSPADM

## 2017-06-05 RX ORDER — DEXAMETHASONE SODIUM PHOSPHATE 4 MG/ML
4 INJECTION, SOLUTION INTRA-ARTICULAR; INTRALESIONAL; INTRAMUSCULAR; INTRAVENOUS; SOFT TISSUE
Status: DISCONTINUED | OUTPATIENT
Start: 2017-06-05 | End: 2017-06-05 | Stop reason: HOSPADM

## 2017-06-05 RX ORDER — SCOLOPAMINE TRANSDERMAL SYSTEM 1 MG/1
1 PATCH, EXTENDED RELEASE TRANSDERMAL
Status: DISCONTINUED | OUTPATIENT
Start: 2017-06-05 | End: 2017-06-05 | Stop reason: HOSPADM

## 2017-06-05 RX ORDER — BUPIVACAINE HYDROCHLORIDE AND EPINEPHRINE 5; 5 MG/ML; UG/ML
INJECTION, SOLUTION EPIDURAL; INTRACAUDAL; PERINEURAL
Status: DISCONTINUED | OUTPATIENT
Start: 2017-06-05 | End: 2017-06-05 | Stop reason: HOSPADM

## 2017-06-05 RX ADMIN — DIPHENHYDRAMINE HYDROCHLORIDE 12.5 MG: 50 INJECTION INTRAMUSCULAR; INTRAVENOUS at 09:35

## 2017-06-05 RX ADMIN — SODIUM CHLORIDE, SODIUM LACTATE, POTASSIUM CHLORIDE, CALCIUM CHLORIDE 1000 ML: 600; 310; 30; 20 INJECTION, SOLUTION INTRAVENOUS at 06:50

## 2017-06-05 RX ADMIN — LIDOCAINE HYDROCHLORIDE 1 ML: 10 INJECTION, SOLUTION INFILTRATION; PERINEURAL at 06:50

## 2017-06-05 ASSESSMENT — PAIN SCALES - GENERAL
PAINLEVEL_OUTOF10: 0
PAINLEVEL_OUTOF10: 4
PAINLEVEL_OUTOF10: 0

## 2017-06-05 NOTE — OR NURSING
RECEIVED FROM OR WITH DR GARCIA.  ORAL AIRWAY IN PLACE.  VSS.  DRESSING ONRIGHT FOREARM/HAND DRY AND IN TACT.  ELEVATED ON PILLOW.  AIRWAY DC'D WITHOUT PROBLEM.  SLEEPY  0915 GIVEN SIPS OF WATER.  DENIES PAIN/NAUSEA  0930  MOTHER BROUGHT TO BEDSIDE.  PATIENT C/O NAUSEA.  MEDICATED PER ORDER AND GIVEN QUEASE EASE.    0950 NAUSEA RESOLVED.    1000 PATIENT ANXIOUS FOR DISCHARGE.  INSTRUCTIONS GIVEN TO PATIENT AND MOTHER.  PATIENT UP AND DRESSED.  DRESSINGS REMAINS DRY AND IN TACT.  ALL QUESTIONS ANSWERED.

## 2017-06-05 NOTE — IP AVS SNAPSHOT
6/5/2017    Mary Martinez  [address] [facility] [place] [address]    Dear Mary:    [facility] wants to ensure your discharge home is safe and you or your loved ones have had all of your questions answered regarding your care after you leave the hospital.    Below is a list of resources and contact information should you have any questions regarding your hospital stay, follow-up instructions, or active medical symptoms.    Questions or Concerns Regarding… Contact   Medical Questions Related to Your Discharge  (7 days a week, 8am-5pm) Contact a Nurse Care Coordinator   [phone]   Medical Questions Not Related to Your Discharge  (24 hours a day / 7 days a week)  Contact the Nurse Health Line   [phone]    Medications or Discharge Instructions Refer to your discharge packet   or contact your [facility] Primary Care Provider   [phone]   Follow-up Appointment(s) Schedule your appointment via [employer]   or contact Scheduling [phone]   Billing Review your statement via [employer]  or contact Billing [phone]   Medical Records Review your records via [employer]   or contact Medical Records [phone]     You may receive a telephone call within two days of discharge. This call is to make certain you understand your discharge instructions and have the opportunity to have any questions answered. You can also easily access your medical information, test results and upcoming appointments via the [employer] free online health management tool. You can learn more and sign up at [employer]. For assistance setting up your [employer] account, please call [phone].    Once again, we want to ensure your discharge home is safe and that you have a clear understanding of any next steps in your care. If you have any questions or concerns, please do not hesitate to contact us, we are here for you. Thank you for choosing [facility] for your healthcare needs.    Sincerely,    Your [facility] Healthcare Team

## 2017-06-05 NOTE — OR SURGEON
Immediate Post-Operative Note      PreOp Diagnosis: severe R A    PostOp Diagnosis: same    Procedure(s):  FINGER ARTHROPLASTY - LONG, RING AND SMALL VOLAR PLATE - Wound Class: Clean  FINGER AMPUTATION - LONG, RING AND SMALL AT THE PROXIMAL INTERPHALANGEAL JOINT - Wound Class: Clean    Surgeon(s):  Lobo Rosen M.D.    Anesthesiologist/Type of Anesthesia:  Anesthesiologist: Javier Shell M.D./General    Surgical Staff:  Circulator: Merry Bender R.N.; Chaya Chino R.N.  Scrub Person: Bonnie Manzanares; Nadege Gurrola R.N.    Specimen: yes    Estimated Blood Loss: 0    Findings: 0    Complications: 0        6/5/2017 9:15 AM Lobo Rosen

## 2017-06-05 NOTE — IP AVS SNAPSHOT
" Home Care Instructions                                                                                                                Name:Mary Martinez  Medical Record Number:[id number]  CSN: [id number]    YOB: 1970   Age: 47 y.o.  Sex: female  HT:1.6 m (5' 3\") WT: 54.7 kg (120 lb 9.5 oz)          Admit Date: 6/5/2017     Discharge Date:   Today's Date: 6/5/2017  Attending Doctor:  Lobo Dunn M.D.                  Allergies:  Nitrous oxide and Penicillins                Discharge Instructions         ACTIVITY: Rest and take it easy for the first 24 hours.  A responsible adult is recommended to remain with you during that time.  It is normal to feel sleepy.  We encourage you to not do anything that requires balance, judgment or coordination.    MILD FLU-LIKE SYMPTOMS ARE NORMAL. YOU MAY EXPERIENCE GENERALIZED MUSCLE ACHES, THROAT IRRITATION, HEADACHE AND/OR SOME NAUSEA.    FOR 24 HOURS DO NOT:  Drive, operate machinery or run household appliances.  Drink beer or alcoholic beverages.   Make important decisions or sign legal documents.    SPECIAL INSTRUCTIONS: *FOLLOW DR DUNN'S ORDERS.  **    DIET: To avoid nausea, slowly advance diet as tolerated, avoiding spicy or greasy foods for the first day.  Add more substantial food to your diet according to your physician's instructions.  Babies can be fed formula or breast milk as soon as they are hungry.  INCREASE FLUIDS AND FIBER TO AVOID CONSTIPATION.    SURGICAL DRESSING/BATHING: *KEEP DRESSING CLEAN AND DRY**    FOLLOW-UP APPOINTMENT:  A follow-up appointment should be arranged with your doctor in ***; call to schedule.    You should CALL YOUR PHYSICIAN if you develop:  Fever greater than 101 degrees F.  Pain not relieved by medication, or persistent nausea or vomiting.  Excessive bleeding (blood soaking through dressing) or unexpected drainage from the wound.  Extreme redness or swelling around the incision site, drainage of pus or " foul smelling drainage.  Inability to urinate or empty your bladder within 8 hours.  Problems with breathing or chest pain.    You should call 911 if you develop problems with breathing or chest pain.  If you are unable to contact your doctor or surgical center, you should go to the nearest emergency room or urgent care center.    Physician's telephone #: *[phone]**    If any questions arise, call your doctor.  If your doctor is not available, please feel free to call the Surgical Center at [phone].  The Center is open Monday through Friday from 7AM to 7PM.  You can also call the HEALTH HOTLINE open 24 hours/day, 7 days/week and speak to a nurse at [phone], or toll free at [phone].    A registered nurse may call you a few days after your surgery to see how you are doing after your procedure.    MEDICATIONS: Resume taking daily medication.  Take prescribed pain medication with food.  If no medication is prescribed, you may take non-aspirin pain medication if needed.  PAIN MEDICATION CAN BE VERY CONSTIPATING.  Take a stool softener or laxative such as senokot, pericolace, or milk of magnesia if needed.      If your physician has prescribed pain medication that includes Acetaminophen (Tylenol), do not take additional Acetaminophen (Tylenol) while taking the prescribed medication.    Depression / Suicide Risk    As you are discharged from this [facility] facility, it is important to learn how to keep safe from harming yourself.    Recognize the warning signs:  · Abrupt changes in personality, positive or negative- including increase in energy   · Giving away possessions  · Change in eating patterns- significant weight changes-  positive or negative  · Change in sleeping patterns- unable to sleep or sleeping all the time   · Unwillingness or inability to communicate  · Depression  · Unusual sadness, discouragement and loneliness  · Talk of wanting to die  · Neglect of personal  appearance   · Rebelliousness- reckless behavior  · Withdrawal from people/activities they love  · Confusion- inability to concentrate     If you or a loved one observes any of these behaviors or has concerns about self-harm, here's what you can do:  · Talk about it- your feelings and reasons for harming yourself  · Remove any means that you might use to hurt yourself (examples: pills, rope, extension cords, firearm)  · Get professional help from the community (Mental Health, Substance Abuse, psychological counseling)  · Do not be alone:Call your Safe Contact- someone whom you trust who will be there for you.  · Call your local CRISIS HOTLINE 886-9860 or 840-564-6796  · Call your local Children's Mobile Crisis Response Team [place] (466) 867-7714 or www.[employer]  · Call the toll free National Suicide Prevention Hotlines   · National Suicide Prevention Lifeline 265-488-HTCI (6397)  · [employer] Line Network 800-SUICIDE (898-0936)       Medication List      CONTINUE taking these medications        Instructions    Morning Afternoon Evening Bedtime    albuterol 108 (90 BASE) MCG/ACT Aers inhalation aerosol        Inhale 2 Puffs by mouth as needed for Shortness of Breath.   Dose:  2 Puff                        cyclobenzaprine 10 MG Tabs   Commonly known as:  FLEXERIL        Take 10 mg by mouth every bedtime.   Dose:  10 mg                        ENBREL 50 MG/ML Sosy   Generic drug:  Etanercept        Inject  as instructed every 7 days.                        meloxicam 7.5 MG Tabs   Commonly known as:  MOBIC        Take 7.5 mg by mouth every bedtime.   Dose:  7.5 mg                        oxycodone-acetaminophen  MG Tabs   Commonly known as:  PERCOCET-10        Take 1 Tab by mouth every four hours as needed for Severe Pain.   Dose:  1 Tab                        paroxetine 20 MG Tabs   Commonly known as:  PAXIL        Take 20 mg by mouth every bedtime.   Dose:  20 mg                        zolpidem 5  MG Tabs   Commonly known as:  AMBIEN        Take 5 mg by mouth at bedtime as needed for Sleep.   Dose:  5 mg                                Medication Information     Above and/or attached are the medications your physician expects you to take upon discharge. Review all of your home medications and newly ordered medications with your doctor and/or pharmacist. Follow medication instructions as directed by your doctor and/or pharmacist. Please keep your medication list with you and share with your physician. Update the information when medications are discontinued, doses are changed, or new medications (including over-the-counter products) are added; and carry medication information at all times in the event of emergency situations.        Resources     Quit Smoking / Tobacco Use:    I understand the use of any tobacco products increases my chance of suffering from future heart disease or stroke and could cause other illnesses which may shorten my life. Quitting the use of tobacco products is the single most important thing I can do to improve my health. For further information on smoking / tobacco cessation call a Toll Free Quit Line at 1-963.343.9807 (*[employer] [place]) or 1-227.957.3576 (American Lung Association) or you can access the web based program at www.lungusa.org.    [place] Tobacco Users Help Line:  (488) 604-8335       Toll Free: 1-927.567.6509  Quit Tobacco Program [facility] (715)406-0049    Crisis Hotline:    [place] Crisis Hotline:  8-179-HHTELWM or 1-477.623.7359    [place] Crisis Hotline:    1-351.500.5613 or 180-733-7773    Discharge Survey:   Thank you for choosing [facility]. We hope we did everything we could to make your hospital stay a pleasant one. You may be receiving a survey and we would appreciate your time and participation in answering the questions. Your input is very valuable to us in our efforts to improve our service to our patients and their  families.            Signatures     My signature on this form indicates that:    1. I acknowledge receipt and understanding of these Home Care Instruction.  2. My questions regarding this information have been answered to my satisfaction.  3. I have formulated a plan with my discharge nurse to obtain my prescribed medications for home.    __________________________________      __________________________________                   Patient Signature                                 Guardian/Responsible Adult Signature      __________________________________                 __________       ________                       Nurse Signature                                               Date                 Time

## 2017-06-05 NOTE — DISCHARGE INSTRUCTIONS
ACTIVITY: Rest and take it easy for the first 24 hours.  A responsible adult is recommended to remain with you during that time.  It is normal to feel sleepy.  We encourage you to not do anything that requires balance, judgment or coordination.    MILD FLU-LIKE SYMPTOMS ARE NORMAL. YOU MAY EXPERIENCE GENERALIZED MUSCLE ACHES, THROAT IRRITATION, HEADACHE AND/OR SOME NAUSEA.    FOR 24 HOURS DO NOT:  Drive, operate machinery or run household appliances.  Drink beer or alcoholic beverages.   Make important decisions or sign legal documents.    SPECIAL INSTRUCTIONS: *FOLLOW DR DUNN'S ORDERS.  **    DIET: To avoid nausea, slowly advance diet as tolerated, avoiding spicy or greasy foods for the first day.  Add more substantial food to your diet according to your physician's instructions.  Babies can be fed formula or breast milk as soon as they are hungry.  INCREASE FLUIDS AND FIBER TO AVOID CONSTIPATION.    SURGICAL DRESSING/BATHING: *KEEP DRESSING CLEAN AND DRY**    FOLLOW-UP APPOINTMENT:  A follow-up appointment should be arranged with your doctor in ***; call to schedule.    You should CALL YOUR PHYSICIAN if you develop:  Fever greater than 101 degrees F.  Pain not relieved by medication, or persistent nausea or vomiting.  Excessive bleeding (blood soaking through dressing) or unexpected drainage from the wound.  Extreme redness or swelling around the incision site, drainage of pus or foul smelling drainage.  Inability to urinate or empty your bladder within 8 hours.  Problems with breathing or chest pain.    You should call 911 if you develop problems with breathing or chest pain.  If you are unable to contact your doctor or surgical center, you should go to the nearest emergency room or urgent care center.    Physician's telephone #: *[phone]**    If any questions arise, call your doctor.  If your doctor is not available, please feel free to call the Surgical Center at [phone].  The Center is open Monday  through Friday from 7AM to 7PM.  You can also call the HEALTH HOTLINE open 24 hours/day, 7 days/week and speak to a nurse at [phone], or toll free at [phone].    A registered nurse may call you a few days after your surgery to see how you are doing after your procedure.    MEDICATIONS: Resume taking daily medication.  Take prescribed pain medication with food.  If no medication is prescribed, you may take non-aspirin pain medication if needed.  PAIN MEDICATION CAN BE VERY CONSTIPATING.  Take a stool softener or laxative such as senokot, pericolace, or milk of magnesia if needed.      If your physician has prescribed pain medication that includes Acetaminophen (Tylenol), do not take additional Acetaminophen (Tylenol) while taking the prescribed medication.    Depression / Suicide Risk    As you are discharged from this [facility] facility, it is important to learn how to keep safe from harming yourself.    Recognize the warning signs:  · Abrupt changes in personality, positive or negative- including increase in energy   · Giving away possessions  · Change in eating patterns- significant weight changes-  positive or negative  · Change in sleeping patterns- unable to sleep or sleeping all the time   · Unwillingness or inability to communicate  · Depression  · Unusual sadness, discouragement and loneliness  · Talk of wanting to die  · Neglect of personal appearance   · Rebelliousness- reckless behavior  · Withdrawal from people/activities they love  · Confusion- inability to concentrate     If you or a loved one observes any of these behaviors or has concerns about self-harm, here's what you can do:  · Talk about it- your feelings and reasons for harming yourself  · Remove any means that you might use to hurt yourself (examples: pills, rope, extension cords, firearm)  · Get professional help from the community (Mental Health, Substance Abuse, psychological counseling)  · Do not be alone:Call your Safe  Contact- someone whom you trust who will be there for you.  · Call your local CRISIS HOTLINE 057-6505 or 332-354-0348  · Call your local Children's Mobile Crisis Response Team [place] (325) 987-5057 or www.[employer]  · Call the toll free National Suicide Prevention Hotlines   · National Suicide Prevention Lifeline 924-101-XYUZ (1944)  · National [employer] Line Network 800-SUICIDE (990-3599)

## 2017-06-07 NOTE — OP REPORT
DATE OF SERVICE:  06/05/2017    PREOPERATIVE DIAGNOSIS:  Severe rheumatoid arthritis.    POSTOPERATIVE DIAGNOSIS:  Severe rheumatoid arthritis with severe deformity of   the right long, ring, and small metacarpophalangeal joints and deformity   status post attempted arthrodesis of the long, ring, and small proximal   interphalangeal joints.    PROCEDURE:  Right long, right ring, right small MP volar plate arthroplasty   and a right long, right ring and right small amputation through the PIP   joints.    ANESTHESIA:  General.    SURGEON:  Lobo Rosen MD    OPERATIVE PROCEDURE:  Patient was taken to the operating room following   induction of general anesthesia.  Right upper extremity was prepped and draped   in routine fashion.  Limb was exsanguinated with an elastic bandage.    Tourniquet inflated to 250 mmHg.  The right long, ring, and small fingers were   disarticulated at the PIP joints.  The pins and wires were removed.  The   disarticulations were carried out through a fish-mouth type incision and the   PIP joints were disarticulated.  The hardware was removed.  The neurovascular   bundles were identified, cut off and allowed to retract proximally and the   wounds were closed in a loose fashion with a fish-mouth type incisions on the   long, ring, and small PIP joints.  On the right hand, the long, ring, and   small MP joints were then approached through a transverse incision, following   sharp and blunt dissection, carried down through the skin and subcutaneous   tissue.  The extensor mechanisms were split longitudinally.  The metacarpal   heads were resected with a sagittal saw and cut off in a slightly reverse   taper so that the volar aspect was cut more proximally than dorsally.  The   joints were debrided.  The volar plates were detached proximally.  They were   brought out between the base of the proximal phalanx and the resected   metacarpal and drill holes were made in the dorsal aspect of the  metacarpal   and the volar plates were then sutured in place with 2-0 Ethibond.  Once this   was accomplished, the MP joints rested in about 30 degrees of flexion.  The   fingers had been amputated through fish-mouth incisions at the level of the   PIP joints and I could actually oppose the thumb to the tips of the fingers.    I thought this would give her the best possible function without having to put   any hardware in the PIP joints or any hardware implants in the MP joints.    The tourniquet was released, hemostasis obtained with electrocautery.  Wounds   irrigated copiously.  The fish-mouth incisions on the fingers were closed with   4-0 nylon.  The finger capsules and extensor mechanisms were reapproximated   with 3-0 Vicryl.  The volar plates had been sutured in place with 2-0 Ethibond   and the skin wounds were then closed with 4-0 nylon.  The incisions were   blocked with 0.5% Marcaine.  A compressive dressing and splint were applied,   the arm was elevated and the patient was taken to recovery room in   satisfactory condition.       ____________________________________     MD SOLOMON SPICER / [place]    DD:  06/06/2017 17:25:23  DT:  06/06/2017 19:55:31    D#:  [id number]  Job#:  [id number]

## 2018-02-05 ENCOUNTER — APPOINTMENT (OUTPATIENT)
Dept: RADIOLOGY | Facility: MEDICAL CENTER | Age: 48
End: 2018-02-05
Attending: EMERGENCY MEDICINE
Payer: MEDICAID

## 2018-02-05 ENCOUNTER — HOSPITAL ENCOUNTER (EMERGENCY)
Facility: MEDICAL CENTER | Age: 48
End: 2018-02-05
Attending: EMERGENCY MEDICINE
Payer: MEDICAID

## 2018-02-05 VITALS
HEART RATE: 120 BPM | HEIGHT: 63 IN | BODY MASS INDEX: 23.67 KG/M2 | RESPIRATION RATE: 17 BRPM | OXYGEN SATURATION: 92 % | WEIGHT: 133.6 LBS | DIASTOLIC BLOOD PRESSURE: 72 MMHG | SYSTOLIC BLOOD PRESSURE: 125 MMHG | TEMPERATURE: 99.4 F

## 2018-02-05 DIAGNOSIS — M25.512 CHRONIC LEFT SHOULDER PAIN: ICD-10-CM

## 2018-02-05 DIAGNOSIS — M19.012 ARTHRITIS OF LEFT SHOULDER REGION: ICD-10-CM

## 2018-02-05 DIAGNOSIS — G89.29 CHRONIC LEFT SHOULDER PAIN: ICD-10-CM

## 2018-02-05 PROCEDURE — 73030 X-RAY EXAM OF SHOULDER: CPT | Mod: LT

## 2018-02-05 PROCEDURE — 96372 THER/PROPH/DIAG INJ SC/IM: CPT

## 2018-02-05 PROCEDURE — 700102 HCHG RX REV CODE 250 W/ 637 OVERRIDE(OP): Performed by: EMERGENCY MEDICINE

## 2018-02-05 PROCEDURE — 700111 HCHG RX REV CODE 636 W/ 250 OVERRIDE (IP): Performed by: EMERGENCY MEDICINE

## 2018-02-05 PROCEDURE — 99284 EMERGENCY DEPT VISIT MOD MDM: CPT

## 2018-02-05 PROCEDURE — A9270 NON-COVERED ITEM OR SERVICE: HCPCS | Performed by: EMERGENCY MEDICINE

## 2018-02-05 RX ORDER — KETOROLAC TROMETHAMINE 30 MG/ML
15 INJECTION, SOLUTION INTRAMUSCULAR; INTRAVENOUS ONCE
Status: COMPLETED | OUTPATIENT
Start: 2018-02-05 | End: 2018-02-05

## 2018-02-05 RX ORDER — DEXAMETHASONE 4 MG/1
8 TABLET ORAL ONCE
Status: COMPLETED | OUTPATIENT
Start: 2018-02-05 | End: 2018-02-05

## 2018-02-05 RX ORDER — LORAZEPAM 2 MG/ML
2 INJECTION INTRAMUSCULAR ONCE
Status: COMPLETED | OUTPATIENT
Start: 2018-02-05 | End: 2018-02-05

## 2018-02-05 RX ORDER — METHYLPREDNISOLONE 4 MG/1
TABLET ORAL
Qty: 1 KIT | Refills: 0 | Status: SHIPPED | OUTPATIENT
Start: 2018-02-05 | End: 2018-06-08

## 2018-02-05 RX ADMIN — KETOROLAC TROMETHAMINE 15 MG: 30 INJECTION, SOLUTION INTRAMUSCULAR at 13:49

## 2018-02-05 RX ADMIN — LORAZEPAM 2 MG: 2 INJECTION INTRAMUSCULAR; INTRAVENOUS at 13:49

## 2018-02-05 RX ADMIN — DEXAMETHASONE 8 MG: 4 TABLET ORAL at 13:48

## 2018-02-05 ASSESSMENT — PAIN SCALES - GENERAL: PAINLEVEL_OUTOF10: 10

## 2018-02-05 NOTE — ED TRIAGE NOTES
Ambulates to triage with mother  Chief Complaint   Patient presents with   • Shoulder Pain     x1 week, much worse today, hx RA, has been takeing her meds, but is not helping     Pt feels like her shoulder is licked up, denies any recent trauma.

## 2018-02-05 NOTE — ED PROVIDER NOTES
ED Provider Note    Scribed for Kiel Watson D.O. by Ray Lawton. 2/5/2018  1:37 PM    Primary care provider: Maikel Rausch M.D.  Means of arrival: walk in  History obtained from: patient  History limited by: none    CHIEF COMPLAINT  Chief Complaint   Patient presents with   • Shoulder Pain     x1 week, much worse today, hx RA, has been takeing her meds, but is not helping       HPI  Mary Martinez is a 47 y.o. female with a history of rheumatoid arthritis who presents to the Emergency Department complaining of suddenly worsening left shoulder pain starting 3 days ago. Patient describes her pain as severe. She reports associated tremors. Patient states that she has administered her prescribed pain medications at home with no relief to her pain. She contacted her pain specialist, Dr. Hathaway, who referred her to the ED for evaluation. Patient denies fever, trauma    REVIEW OF SYSTEMS  Pertinent positives include shoulder pain, tremors. Pertinent negatives include no fever, trauma.  All other systems reviewed and negative.  C.    PAST MEDICAL HISTORY  Past Medical History:   Diagnosis Date   • ASTHMA     inhalers prn   • Dental disorder     upper partial   • Pain     everywhere   • Psychiatric problem     anxiety   • Rheumatoid arthritis ([facility]) 2003       SURGICAL HISTORY  Past Surgical History:   Procedure Laterality Date   • FINGER ARTHROPLASTY Right 6/5/2017    Procedure: FINGER ARTHROPLASTY - LONG, RING AND SMALL VOLAR PLATE;  Surgeon: Lobo Rosen M.D.;  Location: SURGERY SAME DAY [facility];  Service:    • FINGER AMPUTATION  6/5/2017    Procedure: FINGER AMPUTATION - LONG, RING AND SMALL AT THE PROXIMAL INTERPHALANGEAL JOINT;  Surgeon: Lobo Rosen M.D.;  Location: SURGERY SAME DAY [facility];  Service:    • FINGER ARTHROPLASTY Right 4/17/2017    Procedure: FINGER ARTHROPLASTY ;  Surgeon: Lobo Rosen M.D.;  Location: SURGERY SAME DAY [facility];   Service:    • FINGER AMPUTATION Right 9/14/2016    Procedure: FINGER AMPUTATION INDEX;  Surgeon: Lobo Rosen M.D.;  Location: SURGERY SAME DAY [facility];  Service:    • IRRIGATION & DEBRIDEMENT ORTHO Right 9/11/2016    Procedure: IRRIGATION & DEBRIDEMENT ORTHO - right index finger;  Surgeon: Madhu Rosen M.D.;  Location: SURGERY [facility];  Service:    • PIP ARTHRODESIS Right 8/29/2016    Procedure: RE-DO INDEX FINGER PROXIMAL INTERPHALANGEAL ARTHRODESIS;  Surgeon: Lobo Rosen M.D.;  Location: SURGERY SAME DAY [facility];  Service:    • BONE GRAFT Right 8/29/2016    Procedure: BONE GRAFT - DISTAL RADIUS ;  Surgeon: Lobo Rosen M.D.;  Location: SURGERY SAME DAY [facility];  Service:    • ARTHRODESIS Right 5/6/2016    Procedure: ARTHRODESIS INDEX FINGER PROXIMAL INTERPHALANGEAL;  Surgeon: Lobo Rosen M.D.;  Location: SURGERY SAME DAY [facility];  Service:    • FOOT RECONSTRUCTION RHEUMATIC Left 7/29/2015    Procedure: FOOT RECONSTRUCTION RHEUMATIC;  Surgeon: Heriberto Alves M.D.;  Location: SURGERY [facility];  Service:    • TOE FUSION Right 5/27/2015    Procedure: TOE FUSION 1ST METATARSALPHALANGEAL JOINT;  Surgeon: Heriberto Alves M.D.;  Location: SURGERY [facility];  Service:    • BONE SPUR EXCISION  5/27/2015    Procedure: BONE SPUR EXCISION METATARSAL HEAD 2-3;  Surgeon: Heriberto Alves M.D.;  Location: SURGERY [facility];  Service:    • HAMMERTOE CORRECTION  5/27/2015    Procedure: HAMMERTOE CORRECTION AND SOFT TISSUE RE-ALINGMENT 2-3 ;  Surgeon: Heriberto Alves M.D.;  Location: SURGERY [facility];  Service:    • EMANUEL BY LAPAROSCOPY  9/27/2011    Performed by VERO WRIGHT at [facility]   • ABDOMINAL ABSCESS DRAINAGE  9/27/2011    Performed by VERO WRIGHT at [facility]   • OTHER  1982    tonsils   • APPENDECTOMY     • CHOLECYSTECTOMY     • GYN SURGERY      C section X 4   • OTHER ABDOMINAL  "SURGERY      small colon block   • PB  DELIVERY ONLY      x 4   • TONSILLECTOMY     • WRIST ORIF          SOCIAL HISTORY  Social History   Substance Use Topics   • Smoking status: Current Every Day Smoker     Packs/day: 1.00     Years: 30.00     Types: Cigarettes   • Smokeless tobacco: Never Used      Comment: 1 ppd   • Alcohol use No      History   Drug Use No       FAMILY HISTORY  None noted    CURRENT MEDICATIONS  No current facility-administered medications for this encounter.     Current Outpatient Prescriptions:   •  oxycodone-acetaminophen (PERCOCET-10)  MG Tab, Take 1 Tab by mouth every four hours as needed for Severe Pain., Disp: , Rfl:   •  Etanercept (ENBREL) 50 MG/ML Solution Prefilled Syringe, Inject  as instructed every 7 days., Disp: , Rfl:   •  zolpidem (AMBIEN) 5 MG Tab, Take 5 mg by mouth at bedtime as needed for Sleep., Disp: , Rfl:   •  meloxicam (MOBIC) 7.5 MG Tab, Take 7.5 mg by mouth every bedtime., Disp: , Rfl:   •  cyclobenzaprine (FLEXERIL) 10 MG Tab, Take 10 mg by mouth every bedtime., Disp: , Rfl:   •  paroxetine (PAXIL) 20 MG Tab, Take 20 mg by mouth every bedtime., Disp: , Rfl:   •  albuterol (VENTOLIN OR PROVENTIL) 108 (90 BASE) MCG/ACT AERS inhalation aerosol, Inhale 2 Puffs by mouth as needed for Shortness of Breath., Disp: , Rfl:      ALLERGIES  Allergies   Allergen Reactions   • Nitrous Oxide Vomiting   • Penicillins Hives     Tolerates cephalosporins       PHYSICAL EXAM  VITAL SIGNS: /83   Pulse (!) 144   Temp 37.4 °C (99.4 °F)   Resp (!) 24   Ht 1.6 m (5' 3\")   Wt 60.6 kg (133 lb 9.6 oz)   LMP 2011   SpO2 100%   BMI 23.67 kg/m²     Constitutional: Well developed, Well nourished, No acute distress, Non-toxic appearance.   HENT: Normocephalic, Atraumatic, tympanic membranes normal, moist mucous membranes, No oral exudates, no rhinorrhea.  Eyes: PERRLA, EOMI, Conjunctiva normal, No discharge.   Neck: Normal range of motion, No cervical spine " tenderness, Supple, No meningeus, No stridor.  Cardiovascular: Tachycardic Normal rhythm, No murmurs, No rubs, No gallops.   Thorax & Lungs:  No respiratory distress, No rales, No rhonchi, No wheezing, No chest tenderness.   Abdomen: Bowel sounds normal, Soft, No tenderness, No guarding, No rebound, No masses, No pulsatile masses.   Skin: Warm, Dry, No erythema, No rash.   Back: No thoracic or lumbar spinetenderness, No CVA tenderness.   Musculoskeltal: Left shoulder she has decreased range of motion in flexion, extension, abduction. Distal pulses are brisk, she has no deformity, nonicteric fullness, no before meals separation, right shoulder has full range of motion, she is missing the fingers of her right hand from previous surgical intervention   Neurologic: Alert & oriented x 3, ulnar, medial, radial nerve intact to sensation and strength left upper extremity, sensation intact to the deltoid region  Psychiatric: Anxious,    DIAGNOSTIC STUDIES/PROCEDURES    RADIOLOGY  DX-SHOULDER 2+ LEFT   Final Result      No acute fracture of the proximal left humerus identified.   Findings consistent with erosive arthritic changes involving the glenohumeral joint.   Humeral head elevated relative to the glenoid possibly related to rotator cuff tear.      The radiologist's interpretation of all radiological studies have been reviewed by me.    COURSE & MEDICAL DECISION MAKING  Pertinent Labs & Imaging studies reviewed. (See chart for details)    Reviewed the patient's prescription history on [place] Prescription Monitoring Program which showed   Narcotics: 481 (Value from [employer].)  Sedatives: 331 (Value from [employer].)  Stimulants: 0 (Value from [employer].).     1:37 PM - Patient seen and examined at bedside. Physical exam is limited secondary to her profound pain. She will be treated with Ativan 2 mg, Toradol 15 mg, Decadron 8 mg and physical exam re attempted. Ordered DX shoulder to evaluate her  symptoms.     2:50 PM - Patient reevaluated at bedside. Patient is moving her arm much more freely. Discussed waiting for radiology results.     3:14 PM - Recheck: Patient re-evaluated at beside. Patient reports feeling improved with interventions. She is able to use her arm with significantly reduced pain. Patient's radiology results discussed. Discussed patient's condition and treatment plan. She will be placed in a sling for discharge.     3:37 PM - Patient's heart rate remains in the 130s prior to discharge. I reevaluated her at bedside. Her heart rate is rapid, but she is otherwise well appearing. Patient repots a history of rapid heart beat. She wlil be discharged. Patient will be discharged with instructions and provided with strict return precautions. Patient will be sent home with a prescription for Medrol dosepak. Advised to follow up with Dr. Red (Ortho). Instructed to return to Emergency Department immediately if any new or worsening symptoms.    This is a 47-year-old female with arthritis to the left shoulder. She is known to fracture dislocation x-ray. She has no neurological or vascular deficits. The patient initially presents with severe anxiety and she as well as her mother states that she occasionally for anxiety. She received Ativan 2 g IM. Her pain she received the above medications as well. The patient may have a rotator cuff cuff tear and she is placed in a sling. She is slightly tachycardic [place] 120 on discharge but she states that she is normally tachycardic and her physician continue tells her that she is tachycardic at all of her office visits. I do not suspect intrathoracic abnormality, cardiac abnormality. She'll be following up with orthopedist for further evaluation and management of her chronic shoulder pain with acute flare. Given her Medrol Dosepak as an outpatient for decreased inflammation secondary to arthritic presentation.    The patient will return for new or worsening  symptoms and is stable at the time of discharge.    The patient is referred to a primary physician for blood pressure management, diabetic screening, and for all other preventative health concerns.    DISPOSITION:  Patient will be discharged home in stable condition.    FOLLOW UP:  [facility], Emergency Dept  [address]  [place] [id number] [phone]    If symptoms worsen    René Red M.D.  [address]  [facility] [address]  [phone]    Schedule an appointment as soon as possible for a visit        OUTPATIENT MEDICATIONS:  New Prescriptions    METHYLPREDNISOLONE (MEDROL DOSEPAK) 4 MG TABLET THERAPY PACK    Use as directed         FINAL IMPRESSION  1. Chronic left shoulder pain    2. Arthritis of left shoulder region          IRay (Scribe), am scribing for, and in the presence of, Kiel Watson D.O    Electronically signed by: Ray Lawton (Scribe), 2/5/2018    IKiel D.O. personally performed the services described in this documentation, as scribed by Ray Lawton in my presence, and it is both accurate and complete.    The note accurately reflects work and decisions made by me.  Kiel Watson  2/5/2018  10:49 PM

## 2018-02-05 NOTE — DISCHARGE INSTRUCTIONS
Arthritis, Nonspecific  Arthritis is inflammation of a joint. This usually means pain, redness, warmth or swelling are present. One or more joints may be involved. There are a number of types of arthritis. Your caregiver may not be able to tell what type of arthritis you have right away.  CAUSES   The most common cause of arthritis is the wear and tear on the joint (osteoarthritis). This causes damage to the cartilage, which can break down over time. The knees, hips, back and neck are most often affected by this type of arthritis.  Other types of arthritis and common causes of joint pain include:  · Sprains and other injuries near the joint. Sometimes minor sprains and injuries cause pain and swelling that develop hours later.  · Rheumatoid arthritis. This affects hands, feet and knees. It usually affects both sides of your body at the same time. It is often associated with chronic ailments, fever, weight loss and general weakness.  · Crystal arthritis. Gout and pseudo gout can cause occasional acute severe pain, redness and swelling in the foot, ankle, or knee.  · Infectious arthritis. Bacteria can get into a joint through a break in overlying skin. This can cause infection of the joint. Bacteria and viruses can also spread through the blood and affect your joints.  · Drug, infectious and allergy reactions. Sometimes joints can become mildly painful and slightly swollen with these types of illnesses.  SYMPTOMS   · Pain is the main symptom.  · Your joint or joints can also be red, swollen and warm or hot to the touch.  · You may have a fever with certain types of arthritis, or even feel overall ill.  · The joint with arthritis will hurt with movement. Stiffness is present with some types of arthritis.  DIAGNOSIS   Your caregiver will suspect arthritis based on your description of your symptoms and on your exam. Testing may be needed to find the type of arthritis:  · Blood and sometimes urine tests.  · X-ray tests  and sometimes CT or MRI scans.  · Removal of fluid from the joint (arthrocentesis) is done to check for bacteria, crystals or other causes. Your caregiver (or a specialist) will numb the area over the joint with a local anesthetic, and use a needle to remove joint fluid for examination. This procedure is only minimally uncomfortable.  · Even with these tests, your caregiver may not be able to tell what kind of arthritis you have. Consultation with a specialist (rheumatologist) may be helpful.  TREATMENT   Your caregiver will discuss with you treatment specific to your type of arthritis. If the specific type cannot be determined, then the following general recommendations may apply.  Treatment of severe joint pain includes:  · Rest.  · Elevation.  · Anti-inflammatory medication (for example, ibuprofen) may be prescribed. Avoiding activities that cause increased pain.  · Only take over-the-counter or prescription medicines for pain and discomfort as recommended by your caregiver.  · Cold packs over an inflamed joint may be used for 10 to 15 minutes every hour. Hot packs sometimes feel better, but do not use overnight. Do not use hot packs if you are diabetic without your caregiver's permission.  · A cortisone shot into arthritic joints may help reduce pain and swelling.  · Any acute arthritis that gets worse over the next 1 to 2 days needs to be looked at to be sure there is no joint infection.  Long-term arthritis treatment involves modifying activities and lifestyle to reduce joint stress jarring. This can include weight loss. Also, exercise is needed to nourish the joint cartilage and remove waste. This helps keep the muscles around the joint strong.  HOME CARE INSTRUCTIONS   · Do not take aspirin to relieve pain if gout is suspected. This elevates uric acid levels.  · Only take over-the-counter or prescription medicines for pain, discomfort or fever as directed by your caregiver.  · Rest the joint as much as  possible.  · If your joint is swollen, keep it elevated.  · Use crutches if the painful joint is in your leg.  · Drinking plenty of fluids may help for certain types of arthritis.  · Follow your caregiver's dietary instructions.  · Try low-impact exercise such as:  ¨ Swimming.  ¨ Water aerobics.  ¨ Biking.  ¨ Walking.  · Morning stiffness is often relieved by a warm shower.  · Put your joints through regular range-of-motion.  SEEK MEDICAL CARE IF:   · You do not feel better in 24 hours or are getting worse.  · You have side effects to medications, or are not getting better with treatment.  SEEK IMMEDIATE MEDICAL CARE IF:   · You have a fever.  · You develop severe joint pain, swelling or redness.  · Many joints are involved and become painful and swollen.  · There is severe back pain and/or leg weakness.  · You have loss of bowel or bladder control.     This information is not intended to replace advice given to you by your health care provider. Make sure you discuss any questions you have with your health care provider.     Document Released: 01/25/2006 Document Revised: 01/08/2016 Document Reviewed: 03/14/2016  [employer] Patient Education ©2016 [employer]  Arthritis, Nonspecific  Arthritis is inflammation of a joint. This usually means pain, redness, warmth or swelling are present. One or more joints may be involved. There are a number of types of arthritis. Your caregiver may not be able to tell what type of arthritis you have right away.  CAUSES   The most common cause of arthritis is the wear and tear on the joint (osteoarthritis). This causes damage to the cartilage, which can break down over time. The knees, hips, back and neck are most often affected by this type of arthritis.  Other types of arthritis and common causes of joint pain include:  · Sprains and other injuries near the joint. Sometimes minor sprains and injuries cause pain and swelling that develop hours later.  · Rheumatoid  arthritis. This affects hands, feet and knees. It usually affects both sides of your body at the same time. It is often associated with chronic ailments, fever, weight loss and general weakness.  · Crystal arthritis. Gout and pseudo gout can cause occasional acute severe pain, redness and swelling in the foot, ankle, or knee.  · Infectious arthritis. Bacteria can get into a joint through a break in overlying skin. This can cause infection of the joint. Bacteria and viruses can also spread through the blood and affect your joints.  · Drug, infectious and allergy reactions. Sometimes joints can become mildly painful and slightly swollen with these types of illnesses.  SYMPTOMS   · Pain is the main symptom.  · Your joint or joints can also be red, swollen and warm or hot to the touch.  · You may have a fever with certain types of arthritis, or even feel overall ill.  · The joint with arthritis will hurt with movement. Stiffness is present with some types of arthritis.  DIAGNOSIS   Your caregiver will suspect arthritis based on your description of your symptoms and on your exam. Testing may be needed to find the type of arthritis:  · Blood and sometimes urine tests.  · X-ray tests and sometimes CT or MRI scans.  · Removal of fluid from the joint (arthrocentesis) is done to check for bacteria, crystals or other causes. Your caregiver (or a specialist) will numb the area over the joint with a local anesthetic, and use a needle to remove joint fluid for examination. This procedure is only minimally uncomfortable.  · Even with these tests, your caregiver may not be able to tell what kind of arthritis you have. Consultation with a specialist (rheumatologist) may be helpful.  TREATMENT   Your caregiver will discuss with you treatment specific to your type of arthritis. If the specific type cannot be determined, then the following general recommendations may apply.  Treatment of severe joint pain  includes:  · Rest.  · Elevation.  · Anti-inflammatory medication (for example, ibuprofen) may be prescribed. Avoiding activities that cause increased pain.  · Only take over-the-counter or prescription medicines for pain and discomfort as recommended by your caregiver.  · Cold packs over an inflamed joint may be used for 10 to 15 minutes every hour. Hot packs sometimes feel better, but do not use overnight. Do not use hot packs if you are diabetic without your caregiver's permission.  · A cortisone shot into arthritic joints may help reduce pain and swelling.  · Any acute arthritis that gets worse over the next 1 to 2 days needs to be looked at to be sure there is no joint infection.  Long-term arthritis treatment involves modifying activities and lifestyle to reduce joint stress jarring. This can include weight loss. Also, exercise is needed to nourish the joint cartilage and remove waste. This helps keep the muscles around the joint strong.  HOME CARE INSTRUCTIONS   · Do not take aspirin to relieve pain if gout is suspected. This elevates uric acid levels.  · Only take over-the-counter or prescription medicines for pain, discomfort or fever as directed by your caregiver.  · Rest the joint as much as possible.  · If your joint is swollen, keep it elevated.  · Use crutches if the painful joint is in your leg.  · Drinking plenty of fluids may help for certain types of arthritis.  · Follow your caregiver's dietary instructions.  · Try low-impact exercise such as:  ¨ Swimming.  ¨ Water aerobics.  ¨ Biking.  ¨ Walking.  · Morning stiffness is often relieved by a warm shower.  · Put your joints through regular range-of-motion.  SEEK MEDICAL CARE IF:   · You do not feel better in 24 hours or are getting worse.  · You have side effects to medications, or are not getting better with treatment.  SEEK IMMEDIATE MEDICAL CARE IF:   · You have a fever.  · You develop severe joint pain, swelling or redness.  · Many joints are  involved and become painful and swollen.  · There is severe back pain and/or leg weakness.  · You have loss of bowel or bladder control.     This information is not intended to replace advice given to you by your health care provider. Make sure you discuss any questions you have with your health care provider.     Document Released: 01/25/2006 Document Revised: 01/08/2016 Document Reviewed: 03/14/2016  [employer] Interactive Patient Education ©2016 [employer]

## 2018-02-06 ENCOUNTER — PATIENT OUTREACH (OUTPATIENT)
Dept: HEALTH INFORMATION MANAGEMENT | Facility: OTHER | Age: 48
End: 2018-02-06

## 2018-03-25 ENCOUNTER — HOSPITAL ENCOUNTER (EMERGENCY)
Dept: HOSPITAL 8 - ED | Age: 48
Discharge: HOME | End: 2018-03-25
Payer: MEDICAID

## 2018-03-25 VITALS — SYSTOLIC BLOOD PRESSURE: 120 MMHG | DIASTOLIC BLOOD PRESSURE: 91 MMHG

## 2018-03-25 VITALS — HEIGHT: 63 IN | WEIGHT: 121.25 LBS | BODY MASS INDEX: 21.48 KG/M2

## 2018-03-25 DIAGNOSIS — Z90.49: ICD-10-CM

## 2018-03-25 DIAGNOSIS — E86.0: ICD-10-CM

## 2018-03-25 DIAGNOSIS — R11.2: Primary | ICD-10-CM

## 2018-03-25 DIAGNOSIS — M06.9: ICD-10-CM

## 2018-03-25 LAB
ALBUMIN SERPL-MCNC: 4.5 G/DL (ref 3.4–5)
ALP SERPL-CCNC: 96 U/L (ref 45–117)
ALT SERPL-CCNC: 9 U/L (ref 12–78)
ANION GAP SERPL CALC-SCNC: 12 MMOL/L (ref 5–15)
BASOPHILS # BLD AUTO: 0.11 X10^3/UL (ref 0–0.1)
BASOPHILS NFR BLD AUTO: 1 % (ref 0–1)
BILIRUB SERPL-MCNC: 0.5 MG/DL (ref 0.2–1)
CALCIUM SERPL-MCNC: 8.9 MG/DL (ref 8.5–10.1)
CHLORIDE SERPL-SCNC: 112 MMOL/L (ref 98–107)
CREAT SERPL-MCNC: 1.11 MG/DL (ref 0.55–1.02)
EOSINOPHIL # BLD AUTO: 0.29 X10^3/UL (ref 0–0.4)
EOSINOPHIL NFR BLD AUTO: 2 % (ref 1–7)
ERYTHROCYTE [DISTWIDTH] IN BLOOD BY AUTOMATED COUNT: 14.2 % (ref 9.6–15.2)
LYMPHOCYTES # BLD AUTO: 3.48 X10^3/UL (ref 1–3.4)
LYMPHOCYTES NFR BLD AUTO: 29 % (ref 22–44)
MCH RBC QN AUTO: 33.2 PG (ref 27–34.8)
MCHC RBC AUTO-ENTMCNC: 33.8 G/DL (ref 32.4–35.8)
MCV RBC AUTO: 98.4 FL (ref 80–100)
MD: NO
MONOCYTES # BLD AUTO: 0.73 X10^3/UL (ref 0.2–0.8)
MONOCYTES NFR BLD AUTO: 6 % (ref 2–9)
NEUTROPHILS # BLD AUTO: 7.42 X10^3/UL (ref 1.8–6.8)
NEUTROPHILS NFR BLD AUTO: 62 % (ref 42–75)
PLATELET # BLD AUTO: 270 X10^3/UL (ref 130–400)
PMV BLD AUTO: 9.9 FL (ref 7.4–10.4)
PROT SERPL-MCNC: 8.6 G/DL (ref 6.4–8.2)
RBC # BLD AUTO: 4.98 X10^6/UL (ref 3.82–5.3)

## 2018-03-25 PROCEDURE — 96375 TX/PRO/DX INJ NEW DRUG ADDON: CPT

## 2018-03-25 PROCEDURE — 99285 EMERGENCY DEPT VISIT HI MDM: CPT

## 2018-03-25 PROCEDURE — S0028 INJECTION, FAMOTIDINE, 20 MG: HCPCS

## 2018-03-25 PROCEDURE — 36415 COLL VENOUS BLD VENIPUNCTURE: CPT

## 2018-03-25 PROCEDURE — 93005 ELECTROCARDIOGRAM TRACING: CPT

## 2018-03-25 PROCEDURE — 96374 THER/PROPH/DIAG INJ IV PUSH: CPT

## 2018-03-25 PROCEDURE — 80053 COMPREHEN METABOLIC PANEL: CPT

## 2018-03-25 PROCEDURE — 84703 CHORIONIC GONADOTROPIN ASSAY: CPT

## 2018-03-25 PROCEDURE — 83690 ASSAY OF LIPASE: CPT

## 2018-03-25 PROCEDURE — 85025 COMPLETE CBC W/AUTO DIFF WBC: CPT

## 2018-03-25 PROCEDURE — 96361 HYDRATE IV INFUSION ADD-ON: CPT

## 2018-03-25 PROCEDURE — 86677 HELICOBACTER PYLORI ANTIBODY: CPT

## 2018-04-17 ENCOUNTER — HOSPITAL ENCOUNTER (EMERGENCY)
Dept: HOSPITAL 8 - ED | Age: 48
Discharge: HOME | End: 2018-04-17
Payer: MEDICAID

## 2018-04-17 VITALS — SYSTOLIC BLOOD PRESSURE: 112 MMHG | DIASTOLIC BLOOD PRESSURE: 80 MMHG

## 2018-04-17 VITALS — WEIGHT: 130.07 LBS | HEIGHT: 63 IN | BODY MASS INDEX: 23.05 KG/M2

## 2018-04-17 DIAGNOSIS — F17.200: ICD-10-CM

## 2018-04-17 DIAGNOSIS — K29.00: Primary | ICD-10-CM

## 2018-04-17 DIAGNOSIS — M06.9: ICD-10-CM

## 2018-04-17 DIAGNOSIS — Z90.49: ICD-10-CM

## 2018-04-17 LAB
ALBUMIN SERPL-MCNC: 4.4 G/DL (ref 3.4–5)
ALP SERPL-CCNC: 118 U/L (ref 45–117)
ALT SERPL-CCNC: 19 U/L (ref 12–78)
ANION GAP SERPL CALC-SCNC: 9 MMOL/L (ref 5–15)
BASOPHILS # BLD AUTO: 0.02 X10^3/UL (ref 0–0.1)
BASOPHILS NFR BLD AUTO: 0 % (ref 0–1)
BILIRUB SERPL-MCNC: 0.3 MG/DL (ref 0.2–1)
CALCIUM SERPL-MCNC: 9.3 MG/DL (ref 8.5–10.1)
CHLORIDE SERPL-SCNC: 109 MMOL/L (ref 98–107)
CREAT SERPL-MCNC: 1.09 MG/DL (ref 0.55–1.02)
CULTURE INDICATED?: NO
EOSINOPHIL # BLD AUTO: 0.41 X10^3/UL (ref 0–0.4)
EOSINOPHIL NFR BLD AUTO: 3 % (ref 1–7)
ERYTHROCYTE [DISTWIDTH] IN BLOOD BY AUTOMATED COUNT: 16.1 % (ref 9.6–15.2)
LYMPHOCYTES # BLD AUTO: 2.5 X10^3/UL (ref 1–3.4)
LYMPHOCYTES NFR BLD AUTO: 19 % (ref 22–44)
MCH RBC QN AUTO: 33.4 PG (ref 27–34.8)
MCHC RBC AUTO-ENTMCNC: 33.7 G/DL (ref 32.4–35.8)
MCV RBC AUTO: 99 FL (ref 80–100)
MD: NO
MICROSCOPIC: (no result)
MONOCYTES # BLD AUTO: 0.75 X10^3/UL (ref 0.2–0.8)
MONOCYTES NFR BLD AUTO: 6 % (ref 2–9)
NEUTROPHILS # BLD AUTO: 9.8 X10^3/UL (ref 1.8–6.8)
NEUTROPHILS NFR BLD AUTO: 73 % (ref 42–75)
PLATELET # BLD AUTO: 430 X10^3/UL (ref 130–400)
PMV BLD AUTO: 9.2 FL (ref 7.4–10.4)
PROT SERPL-MCNC: 8.7 G/DL (ref 6.4–8.2)
RBC # BLD AUTO: 4.67 X10^6/UL (ref 3.82–5.3)

## 2018-04-17 PROCEDURE — 83690 ASSAY OF LIPASE: CPT

## 2018-04-17 PROCEDURE — 96376 TX/PRO/DX INJ SAME DRUG ADON: CPT

## 2018-04-17 PROCEDURE — 36415 COLL VENOUS BLD VENIPUNCTURE: CPT

## 2018-04-17 PROCEDURE — 99285 EMERGENCY DEPT VISIT HI MDM: CPT

## 2018-04-17 PROCEDURE — 80053 COMPREHEN METABOLIC PANEL: CPT

## 2018-04-17 PROCEDURE — C9113 INJ PANTOPRAZOLE SODIUM, VIA: HCPCS

## 2018-04-17 PROCEDURE — 93005 ELECTROCARDIOGRAM TRACING: CPT

## 2018-04-17 PROCEDURE — 74022 RADEX COMPL AQT ABD SERIES: CPT

## 2018-04-17 PROCEDURE — 81003 URINALYSIS AUTO W/O SCOPE: CPT

## 2018-04-17 PROCEDURE — 85025 COMPLETE CBC W/AUTO DIFF WBC: CPT

## 2018-04-17 PROCEDURE — 96375 TX/PRO/DX INJ NEW DRUG ADDON: CPT

## 2018-04-17 PROCEDURE — 96374 THER/PROPH/DIAG INJ IV PUSH: CPT

## 2018-04-17 PROCEDURE — 74177 CT ABD & PELVIS W/CONTRAST: CPT

## 2018-04-17 PROCEDURE — S0028 INJECTION, FAMOTIDINE, 20 MG: HCPCS

## 2018-04-17 PROCEDURE — 76700 US EXAM ABDOM COMPLETE: CPT

## 2018-04-17 RX ADMIN — MORPHINE SULFATE PRN MG: 4 INJECTION INTRAVENOUS at 09:10

## 2018-04-17 RX ADMIN — MORPHINE SULFATE PRN MG: 4 INJECTION INTRAVENOUS at 10:25

## 2018-06-07 ENCOUNTER — TELEPHONE (OUTPATIENT)
Dept: PHYSICAL MEDICINE AND REHAB | Facility: MEDICAL CENTER | Age: 48
End: 2018-06-07

## 2018-06-07 NOTE — TELEPHONE ENCOUNTER
Dr. Christopher from [facility] called and asked to speak with Dr that is seeing this patient tomorrow.     Dr. Christopher' direct line is [phone].

## 2018-06-08 ENCOUNTER — OFFICE VISIT (OUTPATIENT)
Dept: PHYSICAL MEDICINE AND REHAB | Facility: MEDICAL CENTER | Age: 48
End: 2018-06-08
Payer: MEDICAID

## 2018-06-08 VITALS
BODY MASS INDEX: 24.45 KG/M2 | SYSTOLIC BLOOD PRESSURE: 110 MMHG | OXYGEN SATURATION: 94 % | DIASTOLIC BLOOD PRESSURE: 70 MMHG | WEIGHT: 138 LBS | HEART RATE: 99 BPM | TEMPERATURE: 97.9 F | HEIGHT: 63 IN

## 2018-06-08 DIAGNOSIS — M05.79 RHEUMATOID ARTHRITIS INVOLVING MULTIPLE SITES WITH POSITIVE RHEUMATOID FACTOR (HCC): ICD-10-CM

## 2018-06-08 DIAGNOSIS — S63.219A: ICD-10-CM

## 2018-06-08 DIAGNOSIS — R29.898 LEFT HAND WEAKNESS: ICD-10-CM

## 2018-06-08 DIAGNOSIS — M21.942 HAND DEFORMITY, ACQUIRED, LEFT: ICD-10-CM

## 2018-06-08 DIAGNOSIS — Z74.09 IMPAIRED MOBILITY AND ADLS: ICD-10-CM

## 2018-06-08 DIAGNOSIS — Z78.9 IMPAIRED MOBILITY AND ADLS: ICD-10-CM

## 2018-06-08 DIAGNOSIS — M54.2 CERVICALGIA: ICD-10-CM

## 2018-06-08 DIAGNOSIS — F11.90 CHRONIC, CONTINUOUS USE OF OPIOIDS: ICD-10-CM

## 2018-06-08 PROCEDURE — 99205 OFFICE O/P NEW HI 60 MIN: CPT | Performed by: PHYSICAL MEDICINE & REHABILITATION

## 2018-06-08 RX ORDER — DIPHENHYDRAMINE HYDROCHLORIDE 50 MG/ML
INJECTION INTRAMUSCULAR; INTRAVENOUS
COMMUNITY
Start: 2018-03-25 | End: 2018-06-08

## 2018-06-08 RX ORDER — PREDNISONE 10 MG/1
TABLET ORAL
COMMUNITY
Start: 2018-03-19 | End: 2018-06-08

## 2018-06-08 RX ORDER — TRAMADOL HYDROCHLORIDE 50 MG/1
TABLET ORAL
COMMUNITY
Start: 2018-05-11 | End: 2018-06-08

## 2018-06-08 RX ORDER — PANTOPRAZOLE SODIUM 40 MG/1
TABLET, DELAYED RELEASE ORAL
COMMUNITY
Start: 2018-06-06 | End: 2018-08-02

## 2018-06-08 RX ORDER — GABAPENTIN 600 MG/1
TABLET ORAL
Refills: 1 | COMMUNITY
Start: 2018-05-29 | End: 2018-10-30

## 2018-06-08 RX ORDER — POLYETHYLENE GLYCOL 3350, SODIUM CHLORIDE, SODIUM BICARBONATE, POTASSIUM CHLORIDE 420; 11.2; 5.72; 1.48 G/4L; G/4L; G/4L; G/4L
POWDER, FOR SOLUTION ORAL
COMMUNITY
Start: 2018-05-24 | End: 2018-08-02

## 2018-06-08 RX ORDER — RANITIDINE 150 MG/1
TABLET ORAL
COMMUNITY
Start: 2018-04-27 | End: 2018-06-08

## 2018-06-08 RX ORDER — KETOROLAC TROMETHAMINE 30 MG/ML
INJECTION, SOLUTION INTRAMUSCULAR; INTRAVENOUS
COMMUNITY
Start: 2018-03-21 | End: 2018-06-08

## 2018-06-08 RX ORDER — BUSPIRONE HYDROCHLORIDE 10 MG/1
TABLET ORAL
COMMUNITY
Start: 2018-04-27 | End: 2018-06-08

## 2018-06-08 RX ORDER — LIDOCAINE HYDROCHLORIDE 10 MG/ML
INJECTION, SOLUTION INFILTRATION; PERINEURAL
COMMUNITY
Start: 2018-04-12 | End: 2018-08-02

## 2018-06-08 RX ORDER — PANTOPRAZOLE SODIUM 40 MG/10ML
INJECTION, POWDER, FOR SOLUTION INTRAVENOUS
COMMUNITY
Start: 2018-04-17 | End: 2019-01-18

## 2018-06-08 RX ORDER — METOCLOPRAMIDE 10 MG/1
TABLET ORAL
COMMUNITY
Start: 2018-03-26 | End: 2018-06-08

## 2018-06-08 RX ORDER — SUCRALFATE 1 G/1
TABLET ORAL
COMMUNITY
Start: 2018-04-17 | End: 2018-06-08

## 2018-06-08 RX ORDER — ONDANSETRON 2 MG/ML
INJECTION INTRAMUSCULAR; INTRAVENOUS
COMMUNITY
Start: 2018-03-21 | End: 2018-06-08

## 2018-06-08 RX ORDER — FAMOTIDINE 20 MG/1
TABLET, FILM COATED ORAL
COMMUNITY
Start: 2018-03-22 | End: 2018-06-08

## 2018-06-08 RX ORDER — ONDANSETRON 4 MG/1
TABLET, ORALLY DISINTEGRATING ORAL
COMMUNITY
Start: 2018-04-18 | End: 2018-06-08

## 2018-06-08 RX ORDER — BUSPIRONE HYDROCHLORIDE 15 MG/1
TABLET ORAL
COMMUNITY
Start: 2018-05-11 | End: 2019-08-16

## 2018-06-08 RX ORDER — PAROXETINE HYDROCHLORIDE 40 MG/1
TABLET, FILM COATED ORAL
COMMUNITY
Start: 2018-06-07 | End: 2019-07-19

## 2018-06-08 RX ORDER — OXYCODONE HYDROCHLORIDE AND ACETAMINOPHEN 5; 325 MG/1; MG/1
TABLET ORAL
COMMUNITY
Start: 2018-06-07 | End: 2018-07-03 | Stop reason: SDUPTHER

## 2018-06-08 ASSESSMENT — ENCOUNTER SYMPTOMS
CARDIOVASCULAR NEGATIVE: 1
BACK PAIN: 1
NEUROLOGICAL NEGATIVE: 1
NERVOUS/ANXIOUS: 1
NAUSEA: 1
SHORTNESS OF BREATH: 1
VOMITING: 1
NECK PAIN: 1
WEIGHT LOSS: 1
DEPRESSION: 1
BLURRED VISION: 1

## 2018-06-08 ASSESSMENT — PAIN SCALES - GENERAL: PAINLEVEL: 6=MODERATE PAIN

## 2018-06-08 NOTE — PROGRESS NOTES
New patient note    Physiatry (physical medicine and  Rehabilitation), interventional spine and sports medicine, Pain medicine    Date of Service: 6/8/2018    Chief complaint:   Rheumatoid arthritis    HISTORY    HPI: Mary Martinez 48 y.o. female who presents today with complaint of rheumatoid arthritis that has developed gradually.  She has had it formally diagnosed since at least 2006, she reports.  She presents today for evaluation of assuming care related to her use of chronic opioids.  Her physician, Dr. Barton called me to discuss her care.  She and the patient both report that she has been under the care of Rheumatologist, Dr. Dela Cruz, and her pain management through Dr. Hathaway.  They both report that she was told that testing of her urine through Dr. Hathaway's office did not show her medications.  Dr. Dela Cruz tested later that week and apparently, the testing was normal.    For a short period, she tried to taper off of her medications, but the pain has progressed and she was not able to perform basic ADLs.  Her rheumatoid arthritis has led to limitations in hand function and partial amputation of the right hand has further limited her function.  She requires assistance for most basic dressing and is not able to wear a bra for this reason.  Her children, particularly a son who lives at home helps her with dressing and putting on her shoes.  He also helps her to light cigarettes.    Pain is aching in shoulders, elbows, hands and knees primarily.  She does have some neck stiffness, but does not report significant neck or low back pain.  Some paresthesias in the left hand and wrist aching pain.    In the past, she has been evaluated for surgical management of her left hand, but she does not want to do this for concern about possible surgical infection and further loss of function in the left hand.    Injections in the knees at Dr. Hathaway's office did give some limited relief of her knee pain.  She thinks that  there have been relatively recent xrays through Dr. Hathaway's office of her knees.    She takes gabapentin 600mg po qid, but it is hard for her to say that that this helps much.      ORT 4,  reviewed    Medical records review:  I have discussed her case with Dr. Christopher, who referred her for evaluation here.  See comments in the HPI    Previous treatments:    Physical Therapy: No     Medications the patient is tried: NSAIDs, Narcotics, gabapentin, tylenol, muscle relaxers and tramadol, gabapentin has been more helpful in the past.  No help from muscle relaxants or tramadol    Previous interventions: see PSH and HPI    ROS:   Review of Systems   Constitutional: Positive for weight loss.   HENT:        Dental disease, has lost top teeth   Eyes: Positive for blurred vision.        Wears glasses   Respiratory: Positive for shortness of breath.         Asthma   Cardiovascular: Negative.    Gastrointestinal: Positive for nausea and vomiting.   Genitourinary: Negative.    Musculoskeletal: Positive for back pain, joint pain and neck pain.   Skin: Negative.    Neurological: Negative.    Endo/Heme/Allergies:        Anemia   Psychiatric/Behavioral: Positive for depression. The patient is nervous/anxious.         Bipolar       PMHx:   Past Medical History:   Diagnosis Date   • ASTHMA     inhalers prn   • Dental disorder     upper partial   • Pain     everywhere   • Psychiatric problem     anxiety   • Rheumatoid arthritis(714.0) 2003       PSHx:   Past Surgical History:   Procedure Laterality Date   • FINGER ARTHROPLASTY Right 6/5/2017    Procedure: FINGER ARTHROPLASTY - LONG, RING AND SMALL VOLAR PLATE;  Surgeon: Lobo Rosen M.D.;  Location: SURGERY SAME DAY [facility];  Service:    • FINGER AMPUTATION  6/5/2017    Procedure: FINGER AMPUTATION - LONG, RING AND SMALL AT THE PROXIMAL INTERPHALANGEAL JOINT;  Surgeon: Lobo Rosen M.D.;  Location: SURGERY SAME DAY [facility];  Service:    • FINGER ARTHROPLASTY  Right 4/17/2017    Procedure: FINGER ARTHROPLASTY ;  Surgeon: Lobo Rosen M.D.;  Location: SURGERY SAME DAY [facility];  Service:    • FINGER AMPUTATION Right 9/14/2016    Procedure: FINGER AMPUTATION INDEX;  Surgeon: Lobo Rosen M.D.;  Location: SURGERY SAME DAY [facility];  Service:    • IRRIGATION & DEBRIDEMENT ORTHO Right 9/11/2016    Procedure: IRRIGATION & DEBRIDEMENT ORTHO - right index finger;  Surgeon: Madhu Rosen M.D.;  Location: SURGERY [facility];  Service:    • PIP ARTHRODESIS Right 8/29/2016    Procedure: RE-DO INDEX FINGER PROXIMAL INTERPHALANGEAL ARTHRODESIS;  Surgeon: Lobo Rosen M.D.;  Location: SURGERY SAME DAY [facility];  Service:    • BONE GRAFT Right 8/29/2016    Procedure: BONE GRAFT - DISTAL RADIUS ;  Surgeon: Lobo Rosen M.D.;  Location: SURGERY SAME DAY [facility];  Service:    • ARTHRODESIS Right 5/6/2016    Procedure: ARTHRODESIS INDEX FINGER PROXIMAL INTERPHALANGEAL;  Surgeon: Lobo Rosen M.D.;  Location: SURGERY SAME DAY [facility];  Service:    • FOOT RECONSTRUCTION RHEUMATIC Left 7/29/2015    Procedure: FOOT RECONSTRUCTION RHEUMATIC;  Surgeon: Heriberto Alves M.D.;  Location: [facility];  Service:    • TOE FUSION Right 5/27/2015    Procedure: TOE FUSION 1ST METATARSALPHALANGEAL JOINT;  Surgeon: Heriberto Alves M.D.;  Location: [facility];  Service:    • BONE SPUR EXCISION  5/27/2015    Procedure: BONE SPUR EXCISION METATARSAL HEAD 2-3;  Surgeon: Heriberto Alves M.D.;  Location: [facility];  Service:    • HAMMERTOE CORRECTION  5/27/2015    Procedure: HAMMERTOE CORRECTION AND SOFT TISSUE RE-ALINGMENT 2-3 ;  Surgeon: Heriberto Alves M.D.;  Location: [facility];  Service:    • EMANUEL BY LAPAROSCOPY  9/27/2011    Performed by VERO WRIGHT at [facility]   • ABDOMINAL ABSCESS DRAINAGE  9/27/2011    Performed by VERO WRIGHT at [facility]  ORS   • OTHER  1982    tonsils   • APPENDECTOMY     • CHOLECYSTECTOMY     • GYN SURGERY      C section X 4   • OTHER ABDOMINAL SURGERY      small colon block   • PB  DELIVERY ONLY      x 4   • TONSILLECTOMY     • WRIST ORIF         Family history   History reviewed. No pertinent family history.    Medications:   Current Outpatient Prescriptions   Medication   • busPIRone (BUSPAR) 15 MG tablet   • gabapentin (NEURONTIN) 600 MG tablet   • lidocaine (XYLOCAINE) 1 % Solution   • oxyCODONE-acetaminophen (PERCOCET) 5-325 MG Tab   • pantoprazole (PROTONIX) 40 MG Tablet Delayed Response   • paroxetine (PAXIL) 40 MG tablet   • Etanercept (ENBREL) 50 MG/ML Solution Prefilled Syringe   • paroxetine (PAXIL) 20 MG Tab   • PROTONIX 40 MG Recon Soln   • polyethylene glycol-electrolytes (NULYTELY) 420 GM solution   • albuterol (VENTOLIN OR PROVENTIL) 108 (90 BASE) MCG/ACT AERS inhalation aerosol     No current facility-administered medications for this visit.        Allergies:   Allergies   Allergen Reactions   • Nitrous Oxide Vomiting   • Penicillins Hives     Tolerates cephalosporins       Social Hx:    Social History     Social History   • Marital status:      Spouse name: N/A   • Number of children: N/A   • Years of education: N/A     Occupational History   • Not on file.     Social History Main Topics   • Smoking status: Current Every Day Smoker     Packs/day: 1.00     Years: 30.00     Types: Cigarettes   • Smokeless tobacco: Never Used      Comment: 1 ppd   • Alcohol use No   • Drug use: No   • Sexual activity: Not on file     Other Topics Concern   •  Service No   • Blood Transfusions Yes   • Caffeine Concern No   • Occupational Exposure No   • Hobby Hazards No   • Sleep Concern No   • Stress Concern Yes   • Weight Concern No   • Special Diet No   • Back Care No   • Exercise Yes   • Bike Helmet Yes   • Seat Belt Yes   • Self-Exams Yes     Social History Narrative    ** Merged History Encounter **       "        EXAMINATION     Physical Exam:   Vitals: Blood pressure 110/70, pulse 99, temperature 36.6 °C (97.9 °F), height 1.6 m (5' 3\"), weight 62.6 kg (138 lb), last menstrual period 09/21/2011, SpO2 94 %.    Constitutional:   Body Habitus: Body mass index is 24.45 kg/m².  Cooperation: Fully cooperates with exam  Appearance: Well-groomed, well-nourished, not disheveled   Eyes: No scleral icterus, no proptosis  ENT -no obvious auditory deficits, no facial droop   Skin -no rashes or lesions noted   Respiratory-  breathing comfortable on room air, no audible wheezing  Cardiovascular- capillary refills less than 2 seconds. No lower extremity edema is noted.   Gastrointestinal - no obvious abdominal masses, No tenderness to palpation in the abdomen  Psychiatric- alert and oriented ×3. Normal affect.   Gait - normal gait, no use of ambulatory device, nonantalgic.  Heel and toe walking intact     Musculoskeletal -   Cervical spine   Inspection: No deformities of the skin over the cervical spine. No rashes or lesions.  full  A/P ROM in all directions, without pain; there is popping that is palpable with neck ROM  Spurling’s sign: negative bilaterally  Partial hand amputation on the right at the MCPs; ulnar deviation on the left with MCP subluxation, mild atrophy of the hand intrinsics with wasting of the thenar eminence.    Decreased ROM of the left shoulder which lacks about 30 degrees of active range of motion.  Passive range of motion is painful, but nearly full    Thoracic/Lumbar Spine/Sacral Spine/Hips   Inspection: No evidence of atrophy in bilateral lower extremities throughout   ROM: functional  AROM with flexion, extension, lateral flexion, and rotation bilaterally, without pain     Palpation:   No tenderness to palpation in midline at T1-T12 levels. No tenderness to palpation in the left and right of the midline T1-L5  palpation over SI joint: negative bilaterally    palpation over buttock: negative bilaterally  "     Lumbar spine Special tests  Neuro tension  Seated straight leg test negative bilaterally      Mild crepitus with knee range of motion bilaterally.  There is no significant joint effusion bilaterally.      Neuro     Key points for the international standards for neurological classification of spinal cord injury (ISNCSCI) to light touch.     Dermatome R L   C4 2 2   C5 2 2   C6 2 2   C7 - 2   C8 - 2   T1 2 2   T2 2 2   L2 2 2   L3 2 2   L4 2 2   L5 2 2   S1 2 2   S2 2 2       Motor Exam Upper Extremities   ? Myotome R L   Shoulder flexion C5 4 4   Elbow flexion C5 5 5   Wrist extension C6 5 5   Elbow extension C7 5- 5-   Finger flexion C8 - 4   Finger abduction T1 - 2         Motor Exam Lower Extremities    ? Myotome R L   Hip flexion L2 5 5   Knee extension L3 5 5   Ankle dorsiflexion L4 5 5   Toe extension L5 5 5   Ankle plantarflexion S1 5 5       Meade’s sign negative on the left and not able to assess on the right  Babinski sign negative bilaterally   Clonus of the ankle negative bilaterally     Reflexes  ?  R L   Biceps  2+  2+   Brachioradialis  2+ 2+   Patella  2+ 2+   Achilles   2+ 2+       MEDICAL DECISION MAKING    Medical records review: see under HPI section.     DATA    Labs:     Labs from [facility] 05/19/2018  Glucose 80  BUN 6 Cr 0.83  Sodium 140  Potassium 4.3  Chloride 111  Carbon dioxide 20  Calcium 9.6  Protein, total 7.2  Albumin 4.6  Bilirubin, total. 0.3  AST 13  ALT 9  Alk phos 103    CBC   6.4  Hemoglobin 13.3  Hematocrit 41.6  Platelet 298    TSH 0.43  Vitamin B12 303  Folate 4.9  CRP 7.6    Lab Results   Component Value Date/Time    SODIUM 137 09/14/2016 12:05 AM    POTASSIUM 3.6 09/14/2016 12:05 AM    CHLORIDE 109 09/14/2016 12:05 AM    CO2 21 09/14/2016 12:05 AM    GLUCOSE 111 (H) 09/14/2016 12:05 AM    BUN 7 (L) 09/14/2016 12:05 AM    CREATININE 0.84 09/14/2016 12:05 AM    CREATININE 0.7 11/29/2008 09:05 PM             Lab Results   Component Value Date/Time    WBC 6.4 09/14/2016  12:05 AM    RBC 3.20 (L) 09/14/2016 12:05 AM    HEMOGLOBIN 11.1 (L) 09/14/2016 12:05 AM    HEMATOCRIT 33.5 (L) 09/14/2016 12:05 AM    .7 (H) 09/14/2016 12:05 AM    MCH 34.7 (H) 09/14/2016 12:05 AM    MCHC 33.1 (L) 09/14/2016 12:05 AM    MPV 10.1 09/14/2016 12:05 AM    NEUTSPOLYS 50.90 09/14/2016 12:05 AM    LYMPHOCYTES 38.60 09/14/2016 12:05 AM    MONOCYTES 6.90 09/14/2016 12:05 AM    EOSINOPHILS 2.50 09/14/2016 12:05 AM    BASOPHILS 0.80 09/14/2016 12:05 AM    HYPOCHROMIA 1+ 10/21/2011 08:00 AM    ANISOCYTOSIS 1+ 10/20/2011 06:30 AM          Imaging:    IMAGING radiology reads. I reviewed the following radiology reads   Left shoulder xray 02/05/2018  No acute fracture of the proximal left humerus identified.  Findings consistent with erosive arthritis changes involving the glenohumeral joint.  Humeral head elevation relative to the glenoid possibly relating to rotator cuff tear.                                                                                     Diagnosis   Diagnoses of Rheumatoid arthritis involving multiple sites with positive rheumatoid factor (HCC), Left hand weakness, MCP subluxation, initial encounter, Hand deformity, acquired, left, Chronic, continuous use of opioids, Impaired mobility and ADLs, and Cervicalgia were pertinent to this visit.      ASSESSMENT:  Mary Estes Juan 48 y.o. Female with rheumatoid arthritis, impaired ADLs and chronic pain related to above.     Mary was seen today for new patient.    Diagnoses and all orders for this visit:    Rheumatoid arthritis involving multiple sites with positive rheumatoid factor (HCC)  -     PAIN MANAGEMENT SCRN, UR; Future  -     DX-CERVICAL SPINE-WITH FLEX-EXT   7+; Future    Left hand weakness  -     REFERRAL TO OCCUPATIONAL THERAPY Reason for Therapy: Eval/Treat/Report    MCP subluxation, initial encounter  -     REFERRAL TO OCCUPATIONAL THERAPY Reason for Therapy: Eval/Treat/Report    Hand deformity, acquired, left  -      REFERRAL TO OCCUPATIONAL THERAPY Reason for Therapy: Eval/Treat/Report    Chronic, continuous use of opioids  -     PAIN MANAGEMENT SCRN, UR; Future    Impaired mobility and ADLs  -     REFERRAL TO OCCUPATIONAL THERAPY Reason for Therapy: Eval/Treat/Report    Cervicalgia  -     DX-CERVICAL SPINE-WITH FLEX-EXT   7+; Future    We discussed the limitations to her ADLs. She does not want to pursue further evaluation of the left hand in terms of diagnostic work-up, but I would like to have her work with Occupational therapy for a resting night splint to help support left hand as well as to work on improving her independence with ADLs as much as possible.    Imaging ordered of the cervical spine.    Requesting old records and imaging.    Urine drug screen performed today, I discussed that transition of care with Dr. Christopher and that evaluation of assuming chronic opioids can be 30 days.  The patient will continue to get medications from Dr. Christopher until that time.      Outside records requested:  The patient signed outside records request form for outside records including outside images, records from Dr. Dela Cruz and Dr. Hathaway and [facility] and [facility]      Follow-up: 3-4 weeks      Jayy Kerr MD  Physical Medicine and Rehabilitation  Interventional Spine and Sports Physiatry  [facility]       CC Dr. Rosi Christopher

## 2018-06-14 ENCOUNTER — HOSPITAL ENCOUNTER (OUTPATIENT)
Dept: RADIOLOGY | Facility: MEDICAL CENTER | Age: 48
End: 2018-06-14

## 2018-07-03 ENCOUNTER — OFFICE VISIT (OUTPATIENT)
Dept: PHYSICAL MEDICINE AND REHAB | Facility: MEDICAL CENTER | Age: 48
End: 2018-07-03
Payer: MEDICAID

## 2018-07-03 VITALS
HEIGHT: 63 IN | TEMPERATURE: 98.6 F | DIASTOLIC BLOOD PRESSURE: 74 MMHG | OXYGEN SATURATION: 98 % | SYSTOLIC BLOOD PRESSURE: 112 MMHG | WEIGHT: 133 LBS | HEART RATE: 88 BPM | BODY MASS INDEX: 23.57 KG/M2

## 2018-07-03 DIAGNOSIS — M05.79 RHEUMATOID ARTHRITIS INVOLVING MULTIPLE SITES WITH POSITIVE RHEUMATOID FACTOR (HCC): ICD-10-CM

## 2018-07-03 DIAGNOSIS — M54.2 CERVICALGIA: ICD-10-CM

## 2018-07-03 DIAGNOSIS — F11.90 CHRONIC, CONTINUOUS USE OF OPIOIDS: ICD-10-CM

## 2018-07-03 DIAGNOSIS — Z74.09 IMPAIRED MOBILITY AND ADLS: ICD-10-CM

## 2018-07-03 DIAGNOSIS — Z78.9 IMPAIRED MOBILITY AND ADLS: ICD-10-CM

## 2018-07-03 DIAGNOSIS — M25.561 PAIN IN BOTH KNEES, UNSPECIFIED CHRONICITY: ICD-10-CM

## 2018-07-03 DIAGNOSIS — M25.562 PAIN IN BOTH KNEES, UNSPECIFIED CHRONICITY: ICD-10-CM

## 2018-07-03 DIAGNOSIS — M21.942 HAND DEFORMITY, ACQUIRED, LEFT: ICD-10-CM

## 2018-07-03 PROCEDURE — 99214 OFFICE O/P EST MOD 30 MIN: CPT | Performed by: PHYSICAL MEDICINE & REHABILITATION

## 2018-07-03 RX ORDER — OXYCODONE HYDROCHLORIDE AND ACETAMINOPHEN 5; 325 MG/1; MG/1
1 TABLET ORAL EVERY 8 HOURS PRN
Qty: 90 TAB | Refills: 0 | Status: SHIPPED | OUTPATIENT
Start: 2018-07-11 | End: 2018-08-02 | Stop reason: SDUPTHER

## 2018-07-03 RX ORDER — OXYCODONE HYDROCHLORIDE AND ACETAMINOPHEN 5; 325 MG/1; MG/1
1 TABLET ORAL EVERY 8 HOURS PRN
Qty: 90 TAB | Refills: 0 | Status: SHIPPED | OUTPATIENT
Start: 2018-07-03 | End: 2018-07-03 | Stop reason: SDUPTHER

## 2018-07-03 ASSESSMENT — PAIN SCALES - GENERAL: PAINLEVEL: 7=MODERATE-SEVERE PAIN

## 2018-07-03 NOTE — PROGRESS NOTES
"Follow up patient note  Pain Medicine, Interventional spine and sports physiatry, Physical medicine rehabilitation      Chief complaint:   Joint pain      HISTORY    Please see new patient note dated 06/08/2018 by Dr Kerr,  for more details.     HPI  Patient identification: Mary Martinez 48 y.o. female returns for follow-up of her pain related to rheumatoid arthritis.      Interval history: She reports that she has OT scheduled for this week.  Also, she is going to get her xray of her neck taken this week as well.  No change in her neck pain.  It does seem that it \"pops\" at times.  This is not particularly painful and she says that \"everything pops.\"    Overall, her pain medications have helped her to tolerate her pain, but lately, she is having knee pain that is not going away.  There has been some swelling and the knees feel warm and red.  Sometimes, she feels unstable.  No falls to the floor, but she has had times where she falls back onto the couch.  There is only one stair to enter her home, stairs do make the pain worse.    Review of records: Records from [facility] treatment with Dr. Rosen outlining course and ultimately amputations of the right hand in 2017     ROS Red Flags :   Fever, Chills, Sweats: Yes, following up with PCP and other specialists  Involuntary Weight Loss: Denies  Bowel/Bladder Incontinence: Denies  Saddle Anesthesia: Denies      PMHx:   Past Medical History:   Diagnosis Date   • ASTHMA     inhalers prn   • Dental disorder     upper partial   • Pain     everywhere   • Psychiatric problem     anxiety   • Rheumatoid arthritis(714.0) 2003       PSHx:   Past Surgical History:   Procedure Laterality Date   • FINGER ARTHROPLASTY Right 6/5/2017    Procedure: FINGER ARTHROPLASTY - LONG, RING AND SMALL VOLAR PLATE;  Surgeon: Lobo Rosen M.D.;  Location: SURGERY SAME DAY [facility];  Service:    • FINGER AMPUTATION  6/5/2017    Procedure: FINGER AMPUTATION - LONG, RING AND SMALL " AT THE PROXIMAL INTERPHALANGEAL JOINT;  Surgeon: Lobo Rosen M.D.;  Location: SURGERY SAME DAY [facility];  Service:    • FINGER ARTHROPLASTY Right 4/17/2017    Procedure: FINGER ARTHROPLASTY ;  Surgeon: Lobo Rosen M.D.;  Location: SURGERY SAME DAY [facility];  Service:    • FINGER AMPUTATION Right 9/14/2016    Procedure: FINGER AMPUTATION INDEX;  Surgeon: Lobo Rosen M.D.;  Location: SURGERY SAME DAY [facility];  Service:    • IRRIGATION & DEBRIDEMENT ORTHO Right 9/11/2016    Procedure: IRRIGATION & DEBRIDEMENT ORTHO - right index finger;  Surgeon: Madhu Rosen M.D.;  Location: SURGERY [facility];  Service:    • PIP ARTHRODESIS Right 8/29/2016    Procedure: RE-DO INDEX FINGER PROXIMAL INTERPHALANGEAL ARTHRODESIS;  Surgeon: Lobo Rosen M.D.;  Location: SURGERY SAME DAY [facility];  Service:    • BONE GRAFT Right 8/29/2016    Procedure: BONE GRAFT - DISTAL RADIUS ;  Surgeon: Lobo Rosen M.D.;  Location: SURGERY SAME DAY [facility];  Service:    • ARTHRODESIS Right 5/6/2016    Procedure: ARTHRODESIS INDEX FINGER PROXIMAL INTERPHALANGEAL;  Surgeon: Lobo Rosen M.D.;  Location: SURGERY SAME DAY [facility];  Service:    • FOOT RECONSTRUCTION RHEUMATIC Left 7/29/2015    Procedure: FOOT RECONSTRUCTION RHEUMATIC;  Surgeon: Heriberto Alves M.D.;  Location: [facility];  Service:    • TOE FUSION Right 5/27/2015    Procedure: TOE FUSION 1ST METATARSALPHALANGEAL JOINT;  Surgeon: Heriberto Alves M.D.;  Location: SURGERY [facility];  Service:    • BONE SPUR EXCISION  5/27/2015    Procedure: BONE SPUR EXCISION METATARSAL HEAD 2-3;  Surgeon: Heriberto Alves M.D.;  Location: SURGERY [facility];  Service:    • HAMMERTOE CORRECTION  5/27/2015    Procedure: HAMMERTOE CORRECTION AND SOFT TISSUE RE-ALINGMENT 2-3 ;  Surgeon: Heriberto Alves M.D.;  Location: SURGERY [facility];  Service:    • EMANUEL BY LAPAROSCOPY  9/27/2011     Performed by VERO WRIGHT at SURGERY [facility]   • ABDOMINAL ABSCESS DRAINAGE  2011    Performed by VERO WRIGHT at SURGERY [facility]   • OTHER  1982    tonsils   • APPENDECTOMY     • CHOLECYSTECTOMY     • GYN SURGERY      C section X 4   • OTHER ABDOMINAL SURGERY      small colon block   • PB  DELIVERY ONLY      x 4   • TONSILLECTOMY     • WRIST ORIF         Family history   Denies neuromuscular disease  History reviewed. No pertinent family history.      Medications:   Current Outpatient Prescriptions   Medication   • [START ON 2018] oxyCODONE-acetaminophen (PERCOCET) 5-325 MG Tab   • busPIRone (BUSPAR) 15 MG tablet   • gabapentin (NEURONTIN) 600 MG tablet   • lidocaine (XYLOCAINE) 1 % Solution   • pantoprazole (PROTONIX) 40 MG Tablet Delayed Response   • PROTONIX 40 MG Recon Soln   • paroxetine (PAXIL) 40 MG tablet   • Etanercept (ENBREL) 50 MG/ML Solution Prefilled Syringe   • paroxetine (PAXIL) 20 MG Tab   • albuterol (VENTOLIN OR PROVENTIL) 108 (90 BASE) MCG/ACT AERS inhalation aerosol   • polyethylene glycol-electrolytes (NULYTELY) 420 GM solution     No current facility-administered medications for this visit.        Allergies:   Allergies   Allergen Reactions   • Nitrous Oxide Vomiting   • Penicillins Hives     Tolerates cephalosporins       Social Hx:   Social History     Social History   • Marital status:      Spouse name: N/A   • Number of children: N/A   • Years of education: N/A     Occupational History   • Not on file.     Social History Main Topics   • Smoking status: Current Every Day Smoker     Packs/day: 1.00     Years: 30.00     Types: Cigarettes   • Smokeless tobacco: Never Used      Comment: 1 ppd   • Alcohol use No   • Drug use: No   • Sexual activity: Not on file     Other Topics Concern   •  Service No   • Blood Transfusions Yes   • Caffeine Concern No   • Occupational Exposure No   • Hobby Hazards No   • Sleep Concern No   • Stress  "Concern Yes   • Weight Concern No   • Special Diet No   • Back Care No   • Exercise Yes   • Bike Helmet Yes   • Seat Belt Yes   • Self-Exams Yes     Social History Narrative    ** Merged History Encounter **              EXAMINATION     Physical Exam:   Vitals: Blood pressure 112/74, pulse 88, temperature 37 °C (98.6 °F), height 1.6 m (5' 3\"), weight 60.3 kg (133 lb), last menstrual period 09/21/2011, SpO2 98 %.    Constitutional:   Body Habitus: Body mass index is 23.56 kg/m².  Cooperation: Fully cooperates with exam  Appearance: Well-groomed no disheveled, in no acute distress    Respiratory-  breathing comfortable on room air, no audible wheezing  Cardiovascular- capillary refills less than 2 seconds. No lower extremity edema is noted.   Psychiatric- alert and oriented ×3. Normal affect.   Musculoskeletal: Knees: No warmth, erythema or significant edema.  There is mild crepitus, greater on the right than the left.  Anterior lateral joint line tenderness bilaterally.  No medial or lateral instability.  No focal motor deficits in the bilateral lower extremities.  Sensation is intact to light touch in the lower extremities bilaterally.      Partial hand amputation on the right at the MCPs; ulnar deviation on the left with MCP subluxation, mild atrophy of the hand intrinsics with wasting of the thenar eminence.        MEDICAL DECISION MAKING    DATA    Labs: No new labs for review, requesting labs from [facility]       Imaging: No new imaging reviewed             DIAGNOSIS   Visit Diagnoses     ICD-10-CM   1. Rheumatoid arthritis involving multiple sites with positive rheumatoid factor (HCC) M05.79   2. Impaired mobility and ADLs Z74.09   3. Hand deformity, acquired, left M21.942   4. Chronic, continuous use of opioids F11.90   5. Cervicalgia M54.2   6. Pain in both knees, unspecified chronicity M25.561    M25.562         ASSESSMENT and PLAN:     Mary Martinez 48 y.o. female with rheumatoid arthritis    Mary was " seen today for follow-up.    Diagnoses and all orders for this visit:    Rheumatoid arthritis involving multiple sites with positive rheumatoid factor (HCC)  -     DX-KNEE 3 VIEWS LEFT  -     DX-KNEE 3 VIEWS RIGHT  -     CONSENT FOR OPIATE PRESCRIPTION  -     oxyCODONE-acetaminophen (PERCOCET) 5-325 MG Tab; Take 1 Tab by mouth every 8 hours as needed for Severe Pain for up to 30 days.    Impaired mobility and ADLs    Hand deformity, acquired, left    Chronic, continuous use of opioids    Cervicalgia    Pain in both knees, unspecified chronicity  -     DX-KNEE 3 VIEWS LEFT  -     DX-KNEE 3 VIEWS RIGHT    Discussed that we will get imaging of her knees.  We will consider treatment options depending on those results.    Encouraged her to continue with plans for OT and use of resting hand splint on the left.  From review of records, I see that this has been suggested to her in the past.  Continue to encourage compliance with this.    We are continuing percocet for now.  We discussed the risks, benefits and expectations of using this medication.  Her compliance with treatment, diagnostic studies and maintenance of functional activities will be part of our decision-making in reassessing this management.    Follow up: 1 month    Thank you for allowing me to participate in the care of this patient. If you have any questions please not hesitate to contact me.      Total time: 25 minutes face-to-face time. I spent greater than 50% of the time for patient care and coordination on this date, including with the patient as per assessment and plan above.         Please note that this dictation was created using voice recognition software. I have made every reasonable attempt to correct obvious errors but there may be errors of grammar and content that I may have overlooked prior to finalization of this note.      Jayy Kerr MD  Interventional Spine and Sports Physiatry  Physical Medicine and Rehabilitation  [facility]  7/3/2018 8:51 AM

## 2018-07-05 ENCOUNTER — OCCUPATIONAL THERAPY (OUTPATIENT)
Dept: OCCUPATIONAL THERAPY | Facility: REHABILITATION | Age: 48
End: 2018-07-05
Attending: PHYSICAL MEDICINE & REHABILITATION
Payer: MEDICAID

## 2018-07-05 DIAGNOSIS — Z74.09 IMPAIRED MOBILITY AND ADLS: ICD-10-CM

## 2018-07-05 DIAGNOSIS — S63.219A SUBLUXATION OF METACARPOPHALANGEAL JOINT OF FINGER, INITIAL ENCOUNTER: ICD-10-CM

## 2018-07-05 DIAGNOSIS — R29.898 LEFT HAND WEAKNESS: ICD-10-CM

## 2018-07-05 DIAGNOSIS — M21.942 ACQUIRED DEFORMITY OF LEFT HAND: ICD-10-CM

## 2018-07-05 DIAGNOSIS — Z78.9 IMPAIRED MOBILITY AND ADLS: ICD-10-CM

## 2018-07-05 PROCEDURE — 97166 OT EVAL MOD COMPLEX 45 MIN: CPT

## 2018-07-05 PROCEDURE — G8985 CARRY GOAL STATUS: HCPCS | Mod: CK

## 2018-07-05 PROCEDURE — 97110 THERAPEUTIC EXERCISES: CPT

## 2018-07-05 PROCEDURE — G8984 CARRY CURRENT STATUS: HCPCS | Mod: CL

## 2018-07-05 SDOH — ECONOMIC STABILITY: GENERAL: QUALITY OF LIFE: FAIR

## 2018-07-05 ASSESSMENT — ENCOUNTER SYMPTOMS
QUALITY: BURNING
EXACERBATED BY: MOVEMENT
PAIN SCALE AT LOWEST: 3
ALLEVIATING FACTORS: PAIN MEDICATION
EXACERBATED BY: ACTIVITY
PAIN SCALE AT HIGHEST: 9
EXACERBATED BY: GRIPPING
PAIN TIMING: WHEN ACTIVE
ALLEVIATING FACTORS: MASSAGE
PAIN LOCATION: BILATERAL HANDS
PAIN SCALE: 5
QUALITY: SHARP

## 2018-07-05 NOTE — OP THERAPY EVALUATION
Outpatient Occupational Therapy  HAND THERAPY INITIAL EVALUATION    [facility] Occupational Therapy [address]  [address]  [place] [address]  Phone:  [phone]  Fax:  [phone]    Date of Evaluation: 2018    Patient: Mary Martinez  YOB: 1970  MRN: [id number]     Referring Provider: Jayy Kerr M.D.  [id number]  [place] [address]   Referring Diagnosis Left hand weakness [R29.898];MCP subluxation, initial encounter [S63.219A];Hand deformity, acquired, left [M21.942];Impaired mobility and ADLs [Z74.09]     Time Calculation  Start time: 0200  Stop time: 0300 Time Calculation (min): 60 minutes     Occupational Therapy Occurrence Codes    Date of onset of impairment:  7/15/06   Date of occupational therapy care plan established or reviewed:  18   Date of occupational therapy treatment started:  18          Chief Complaint: Rheumatoid Arthritis; Hand Weakness; and Self Care Duties    Visit Diagnoses     ICD-10-CM   1. Left hand weakness R29.898   2. Subluxation of metacarpophalangeal joint of finger, initial encounter S63.219A   3. Acquired deformity of left hand M21.942   4. Impaired mobility and ADLs Z74.09       Subjective:   History of Present Illness:     Date of onset:  7/15/2006    Mechanism of injury:  Pt reports a progressive loss of left hand function and strength over the past 3 months affecting ability to perform ADLs and IADLs  Quality of life:  Fair  Prior level of function:  Somewhat higher level than past 3 months  Pain:     Current pain ratin    At best pain rating:  3    At worst pain ratin    Location:  Bilateral hands    Quality:  Sharp and burning    Pain timing:  When active    Relieving factors:  Massage and pain medication    Aggravating factors:  Activity, gripping and movement    Progression:  Worsening  Social Support:     Lives in:  One-story house    Lives with:  Adult children and young children (4  children, 2 dogs, 3 cats)  Hand dominance:  Right  Treatments:     Current treatment:  Medication  Activities of Daily Living:     Patient reported ADL status: Mod I feeding, min assist grooming for hair care, Bathing: mod assist sponge bathing.  Mod assist UB dressing: able to don shirt, full assist to doff.  Min assist LB dressing for socks.   Pt able to perform basic snack/beverage prep.  Independent driving, cares for 3 children ages 10-16 (the 16 y.o has Down Syndrome)  Patient Goals:     Patient goals for therapy:  Increased strength, independence with ADLs/IADLs, increased motion and decreased pain    Other patient goals:  Improve function      Past Medical History:   Diagnosis Date   • ASTHMA     inhalers prn   • Dental disorder     upper partial   • Pain     everywhere   • Psychiatric problem     anxiety   • Rheumatoid arthritis(714.0) 2003     Past Surgical History:   Procedure Laterality Date   • FINGER ARTHROPLASTY Right 6/5/2017    Procedure: FINGER ARTHROPLASTY - LONG, RING AND SMALL VOLAR PLATE;  Surgeon: Lobo Rosen M.D.;  Location: SURGERY SAME DAY [facility];  Service:    • FINGER AMPUTATION  6/5/2017    Procedure: FINGER AMPUTATION - LONG, RING AND SMALL AT THE PROXIMAL INTERPHALANGEAL JOINT;  Surgeon: Lobo Rosen M.D.;  Location: SURGERY SAME DAY [facility];  Service:    • FINGER ARTHROPLASTY Right 4/17/2017    Procedure: FINGER ARTHROPLASTY ;  Surgeon: Lobo Rosen M.D.;  Location: SURGERY SAME DAY [facility];  Service:    • FINGER AMPUTATION Right 9/14/2016    Procedure: FINGER AMPUTATION INDEX;  Surgeon: Lobo Rosen M.D.;  Location: SURGERY SAME DAY [facility];  Service:    • IRRIGATION & DEBRIDEMENT ORTHO Right 9/11/2016    Procedure: IRRIGATION & DEBRIDEMENT ORTHO - right index finger;  Surgeon: Madhu Rosen M.D.;  Location: SURGERY [facility];  Service:    • PIP ARTHRODESIS Right 8/29/2016    Procedure: RE-DO INDEX FINGER PROXIMAL  INTERPHALANGEAL ARTHRODESIS;  Surgeon: Lobo Rosen M.D.;  Location: SURGERY SAME DAY [facility];  Service:    • BONE GRAFT Right 2016    Procedure: BONE GRAFT - DISTAL RADIUS ;  Surgeon: Lobo Rosen M.D.;  Location: SURGERY SAME DAY [facility];  Service:    • ARTHRODESIS Right 2016    Procedure: ARTHRODESIS INDEX FINGER PROXIMAL INTERPHALANGEAL;  Surgeon: Lobo Rosen M.D.;  Location: SURGERY SAME DAY [facility];  Service:    • FOOT RECONSTRUCTION RHEUMATIC Left 2015    Procedure: FOOT RECONSTRUCTION RHEUMATIC;  Surgeon: Heriberto Alves M.D.;  Location: SURGERY [facility];  Service:    • TOE FUSION Right 2015    Procedure: TOE FUSION 1ST METATARSALPHALANGEAL JOINT;  Surgeon: Heriberto Alves M.D.;  Location: SURGERY [facility];  Service:    • BONE SPUR EXCISION  2015    Procedure: BONE SPUR EXCISION METATARSAL HEAD 2-3;  Surgeon: Heriberto Alves M.D.;  Location: SURGERY [facility];  Service:    • HAMMERTOE CORRECTION  2015    Procedure: HAMMERTOE CORRECTION AND SOFT TISSUE RE-ALINGMENT 2-3 ;  Surgeon: Heriberto Alves M.D.;  Location: SURGERY [facility];  Service:    • EMANUEL BY LAPAROSCOPY  2011    Performed by VERO WRIGHT at [facility]   • ABDOMINAL ABSCESS DRAINAGE  2011    Performed by VERO WRIGHT at [facility]   • OTHER  1982    tonsils   • APPENDECTOMY     • CHOLECYSTECTOMY     • GYN SURGERY      C section X 4   • OTHER ABDOMINAL SURGERY      small colon block   • PB  DELIVERY ONLY      x 4   • TONSILLECTOMY     • WRIST ORIF       Social History   Substance Use Topics   • Smoking status: Current Every Day Smoker     Packs/day: 1.00     Years: 30.00     Types: Cigarettes   • Smokeless tobacco: Never Used      Comment: 1 ppd   • Alcohol use No     Family and Occupational History     Social History   • Marital status:      Spouse name: N/A   • Number of children: N/A   •  Years of education: N/A       Objective   Observations   Left Wrist/Hand   Positive for atrophy, deformity, swan neck deformity and ulnar drift.       Right Wrist/Hand   Positive for deformity.       Additional Observation Details  Left hand: all digits in ulnar deviation with MCP subluxation, prominent ulnar head, multiple bony deformities, atrophy of intrinsic hand muscles, thenar wasting, severe joint laxity PIP joints.  Flexion contractures of wrist.   Right hand: Partial hand amputation at MCP's      Neurological Testing   Sensation   Wrist/Hand   Left   Hypersensation: light touch     Right   Hypersensation: light touch          Tenderness     Additional Tenderness Details  Tenderness to palpation left wrist circumferentially, tenderness at common extensor origin    Active Range of Motion     Left Wrist   Wrist pronation: WFL  Wrist supination: WFL  Radial deviation: with pain  Ulnar deviation: WFL      Left Thumb   Opposition: Thumb AROM WFL    Right Thumb   Opposition: Thumb AROM/strength WFL      Additional Active Range of Motion Details  Left wrist flexion: 30 degrees  Extension: 0 degrees, contracture at 10 degrees of flexion  Radial deviation: 0 degrees, contracture at 10 degrees of ulnar deviation  Ulnar deviation: 30 degrees  Right wrist flex/ext and deviation WFL  Left digit flexion, abduction/adduction WFL,   Extension of left IF WFL. Digits 3-5 active extension from PIP to DIP joints via lumbricals, no activation of EDC for extension at MCP joints.    Passive Range of Motion     Left Digits   Normal passive range of motion    Additional Passive Range of Motion Details  Able to passive extend left wrist to neutral position, pain limiting attempts at any wrist extension past neutral.    Strength     Right Wrist/Hand   Wrist extension: 3+    Additional Strength Details  Left hand:  strength 3/5, Left thumb IP extension strength 3-/5,  MCP extension 3/5   Radial/palmar abduction and adduction  3/5  Index finger extension 3/5  Digits 3-5 extension 0/5 at MCP joints, 3/5 lumbricals for extension of PIP-DIP joints              Therapeutic Exercises (CPT 90349):     1. Left wrist/digits    Therapeutic Exercise Summary:  Passive sustained stretch as tolerated to left wrist and digits, limited by pain and anxiety.  Performed active digit extension exercise PIPs to DIPs with MCP's held in extension.      Time-based treatments/modalities:  Therapeutic exercise minutes (CPT 83984): 15 minutes        Assessment and Plan:   Problem list/assessment: abnormal or restricted ROM, decreased HEP knowledge, decreased strength, limited ADL's and pain    Assessment details:  Pt is a 48 y.o female with RA since 2006 who presents to outpatient OT reporting a progressive loss of left hand function and strength over the past 3 months decreasing her ability to perform ADLs and IADLs.  Pt demonstrates left hand bony deformities, soft tissue restrictions including flexion contractures at wrist, weakness, abnormal sensation, chronic pain, anxiety, and knowledge deficits regarding positioning, compensatory techniques/AE for ADLs/IADLs, and HEP.    Barriers to therapy:  Psychosocial    Goals:   Short Term Goals: decrease hypersensitivity, decrease pain, increase ADL independence, increase range of motion, increase soft tissue/skin mobility, increase strength and independent with HEP performance  Short term goal timespan:  1-2 weeks    Long Term Goals:   Pt will require min assist for ADL routine with AE/techniques  Pt will increase left  strength to 3+/5 for basic IADLs  Pt will be independent positioning to prevent further contractures and non-pharmacological pain management techniques.  Pt will independently incorporate principles of joint protection, body mechanics and ergonomics into daily routine  Pt will be independent HEP for stretching and strengthening  Pt will score <= 45% Disability on the Quick Dash    Long term goal  timespan:  2-4 weeks    Plan:   Occupational/Hand Therapy options:  Occupational therapy treatment to continue  Planned therapy interventions:  Splinting/orthosis, AROM, A/AROM, PROM, passive stretch, home exercise training, pain management, adaptive training to increase functional performance, graded resistive tasks to increase strength, neuromuscular re-education (CPT 45295) and patient/family/caregiver education  Planned modality interventions: paraffin treatment (CPT 87869)  Prognosis: fair    Frequency:  2x week  Duration in weeks:  4  Duration in visits:  8  Discussed with:  Patient and family  Patient/caregiver understanding of therapy treatment and goals: Good understanding of therapy treatment and goals      Functional Limitation G-Codes and Severity Modifiers  OT Functional Assessment Tool Used: Quick Dash  OT Functional Assessment Score: 75% Disability  Quickdash General Total Score: 75   Current: Carry: Current (): CL   Goal: Carry: Goal  (): CK       Referring provider co-signature:  I have reviewed this plan of care and my co-signature certifies the need for services.  Certification Dates:   From 7/5/18    To 8/29/18    Physician Signature: ________________________________ Date: ______________

## 2018-07-06 ENCOUNTER — HOSPITAL ENCOUNTER (OUTPATIENT)
Dept: RADIOLOGY | Facility: MEDICAL CENTER | Age: 48
End: 2018-07-06

## 2018-07-12 ENCOUNTER — TELEPHONE (OUTPATIENT)
Dept: PHYSICAL MEDICINE AND REHAB | Facility: MEDICAL CENTER | Age: 48
End: 2018-07-12

## 2018-07-12 NOTE — TELEPHONE ENCOUNTER
The patient's Son, Lobo called to let you know another Doctor order Rx for a lumbar spine , she did on 7/5/2018( images are in media for your review) He state her Mom has an appt with Dr Valdovinos at [facility] next Wednesday July 18, 2018. But he wants to know your opinion before .

## 2018-07-19 ENCOUNTER — OCCUPATIONAL THERAPY (OUTPATIENT)
Dept: OCCUPATIONAL THERAPY | Facility: REHABILITATION | Age: 48
End: 2018-07-19
Attending: PHYSICAL MEDICINE & REHABILITATION
Payer: MEDICAID

## 2018-07-19 DIAGNOSIS — Z74.09 IMPAIRED MOBILITY AND ADLS: ICD-10-CM

## 2018-07-19 DIAGNOSIS — R29.898 LEFT HAND WEAKNESS: ICD-10-CM

## 2018-07-19 DIAGNOSIS — S63.219A SUBLUXATION OF METACARPOPHALANGEAL JOINT OF FINGER, INITIAL ENCOUNTER: ICD-10-CM

## 2018-07-19 DIAGNOSIS — M21.942 ACQUIRED DEFORMITY OF LEFT HAND: ICD-10-CM

## 2018-07-19 DIAGNOSIS — Z78.9 IMPAIRED MOBILITY AND ADLS: ICD-10-CM

## 2018-07-19 PROCEDURE — 97110 THERAPEUTIC EXERCISES: CPT

## 2018-07-19 PROCEDURE — 97760 ORTHOTIC MGMT&TRAING 1ST ENC: CPT

## 2018-07-19 NOTE — OP THERAPY DAILY TREATMENT
"  Outpatient Occupational Therapy  DAILY TREATMENT     [facility] Occupational Therapy [address]  [address]  [place] [address]  Phone:  [phone]  Fax:  [phone]    Date: 07/19/2018    Patient: Mary Martinez  YOB: 1970  MRN: [id number]     Time Calculation  Start time: 0330  Stop time: 0400 Time Calculation (min): 30 minutes     Chief Complaint: Hand Problem (left hand weakness, deformity)    Visit #: 2    SUBJECTIVE: \"I had a bunch of x-rays\"    OBJECTIVE:  Current objective measures: left hand digit adduction/abduction strength 3/5        Therapeutic Exercises (CPT 20359):     1. Left hand    Therapeutic Exercise Summary:  Light passive stretch to left wrist and digits, performed gross abduction/adduction with lateral ulnar support for improved alignment x 2 sets.  Attempted digit flex/ext with palm down with limited success.      Therapeutic Treatments and Modalities:    1. Orthotic Training (CPT 53391)    Therapeutic Treatments and Modalities Summary: Fit with small left hand-based ulnar deviation splint, added extra velcro to improve fit.   Pt reported \"it felt comfortable\", her finger's had \"more control\", and was able to  various sized objects \"easier\".  Digits appearing more aligned towards neutral and she was able to fully flex digits PIP to DIP joints.  Full assist to don splint, pt to wear during the day as comfortable with occasional skin checks with good understanding.    Time-based treatments/modalities:  Therapeutic exercise minutes (CPT 34990): 15 minutes  Orthotics training initial, minutes (CPT 70322): 15 minutes      ASSESSMENT:   Response to treatment: Pt fit with left hand ulnar deviation splint to maintain digits towards neutral alignment with improved ability to grasp and release various sized objects while preventing further deformity due to subluxations.  Modifications to be made next visit as needed. Pt participated in intrinsic hand " strengthening with good effort, to perform 2 sets of 10, 3 x day.    PLAN/RECOMMENDATIONS:   Plan for treatment: therapy treatment to continue next visit.  Planned interventions for next visit: continue with current treatment, orthotic training (CPT 81752), therapeutic activities (CPT 88558) and therapeutic exercise (CPT 81144)

## 2018-07-24 ENCOUNTER — OCCUPATIONAL THERAPY (OUTPATIENT)
Dept: OCCUPATIONAL THERAPY | Facility: REHABILITATION | Age: 48
End: 2018-07-24
Attending: PHYSICAL MEDICINE & REHABILITATION
Payer: MEDICAID

## 2018-07-24 DIAGNOSIS — M21.942 ACQUIRED DEFORMITY OF LEFT HAND: ICD-10-CM

## 2018-07-24 DIAGNOSIS — S63.219A SUBLUXATION OF METACARPOPHALANGEAL JOINT OF FINGER, INITIAL ENCOUNTER: ICD-10-CM

## 2018-07-24 DIAGNOSIS — R29.898 LEFT HAND WEAKNESS: ICD-10-CM

## 2018-07-24 PROCEDURE — 97763 ORTHC/PROSTC MGMT SBSQ ENC: CPT

## 2018-07-24 PROCEDURE — 97110 THERAPEUTIC EXERCISES: CPT

## 2018-07-24 NOTE — OP THERAPY DAILY TREATMENT
"  Outpatient Occupational Therapy  DAILY TREATMENT     [facility] Occupational Therapy [address].  [address]  [place] [address]  Phone:  [phone]  Fax:  [phone]    Date: 07/24/2018    Patient: Mary Martinez  YOB: 1970  MRN: [id number]     Time Calculation  Start time: 0300  Stop time: 0330 Time Calculation (min): 30 minutes     Chief Complaint: Hand Problem (left hand weakness, deformity)    Visit #: 3    SUBJECTIVE: \"I've been wearing the left hand splint most of the time and have a better  on things\"    OBJECTIVE:  Current objective measures: Bilateral thumb flex/ext strength 3-/5 except left MCP extension 3/5        Therapeutic Exercises (CPT 91193):     1. Bilateral thumbs    Therapeutic Exercise Summary:  Bilateral thumb strengthening for flexion/extension as able using light resistance rubber band with focus on stability rather than maximum range.  Pt to incorporate into HEP.    Therapeutic Treatments and Modalities:    1. Orthotic Training (CPT 08409)    Therapeutic Treatments and Modalities Summary: Left hand ulnar deviation splint: modified by adding 1/2 inch velfoam strap to allow for independent donning/doffing of splint.  pt reported increased comfort     Time-based treatments/modalities:  Therapeutic exercise minutes (CPT 89750): 15 minutes  Orthotic/Prosthetic subsq visit, minutes (CPT 62766): 15 minutes        ASSESSMENT:   Response to treatment: Pt responded well to modification to left hand ulnar deviation splint and is now able to independently don/doff with improved comfort and functionality.  Initiated and performed light thumb strengthening as able.  HEP updated with good understanding.    PLAN/RECOMMENDATIONS:   Plan for treatment: therapy treatment to continue next visit.  Planned interventions for next visit: continue with current treatment, orthotic training (CPT 11200), therapeutic activities (CPT 13032) and therapeutic exercise (CPT " 89772)

## 2018-07-26 ENCOUNTER — OCCUPATIONAL THERAPY (OUTPATIENT)
Dept: OCCUPATIONAL THERAPY | Facility: REHABILITATION | Age: 48
End: 2018-07-26
Attending: PHYSICAL MEDICINE & REHABILITATION
Payer: MEDICAID

## 2018-07-26 DIAGNOSIS — M21.942 ACQUIRED DEFORMITY OF LEFT HAND: ICD-10-CM

## 2018-07-26 DIAGNOSIS — R29.898 LEFT HAND WEAKNESS: ICD-10-CM

## 2018-07-26 DIAGNOSIS — Z78.9 IMPAIRED MOBILITY AND ADLS: ICD-10-CM

## 2018-07-26 DIAGNOSIS — Z74.09 IMPAIRED MOBILITY AND ADLS: ICD-10-CM

## 2018-07-26 DIAGNOSIS — S63.219A SUBLUXATION OF METACARPOPHALANGEAL JOINT OF FINGER, INITIAL ENCOUNTER: ICD-10-CM

## 2018-07-26 PROCEDURE — 97110 THERAPEUTIC EXERCISES: CPT

## 2018-07-26 NOTE — OP THERAPY DAILY TREATMENT
"  Outpatient Occupational Therapy  DAILY TREATMENT     [facility] Occupational Therapy [address].  [address]  [place] [address]  Phone:  [phone]  Fax:  [phone]    Date: 07/26/2018    Patient: Mary Martinez  YOB: 1970  MRN: [id number]     Time Calculation  Start time: 1000  Stop time: 1030 Time Calculation (min): 30 minutes     Chief Complaint: Hand Problem (left hand weakness)    Visit #: 4    SUBJECTIVE: \"The splint is working out well\", \"I can put my socks on alone and am bathing more on my own    OBJECTIVE:  Current objective measures: Left hand  strength 3/5        Therapeutic Exercises (CPT 89161):     1. Theraputty    Therapeutic Exercise Summary:  Issued and instructed on light resistance theraputty exercises focused on , rolling a log, pinch, in-hand manipulation, rolling a ball on table with wrist towards neutral, pulling apart with LH in intrinsic plus position, and gross extension.  All exercises performed with focus on digits/wrist towards radial deviation resulting in more neutral alignment and avoidance of ulnar deviation.   Discussed and problem solved using AE/technique for doffing shirt, pt to trial recommendations at home.      Time-based treatments/modalities:  Therapeutic exercise minutes (CPT 23275): 30 minutes      ASSESSMENT:   Response to treatment: Pt making good progress today with left hand strength with performance of theraputty exercises described above while focusing on joint alignment to prevent further ulnar deviation and deformity. HEP updated with good understanding.    PLAN/RECOMMENDATIONS:   Plan for treatment: therapy treatment to continue next visit.  Planned interventions for next visit: continue with current treatment, self care ADL training (CPT 42105), therapeutic activities (CPT 13877) and therapeutic exercise (CPT 18630)      "

## 2018-07-31 ENCOUNTER — OCCUPATIONAL THERAPY (OUTPATIENT)
Dept: OCCUPATIONAL THERAPY | Facility: REHABILITATION | Age: 48
End: 2018-07-31
Attending: PHYSICAL MEDICINE & REHABILITATION
Payer: MEDICAID

## 2018-07-31 DIAGNOSIS — R29.898 LEFT HAND WEAKNESS: ICD-10-CM

## 2018-07-31 DIAGNOSIS — S63.219A SUBLUXATION OF METACARPOPHALANGEAL JOINT OF FINGER, INITIAL ENCOUNTER: ICD-10-CM

## 2018-07-31 DIAGNOSIS — M21.942 ACQUIRED DEFORMITY OF LEFT HAND: ICD-10-CM

## 2018-07-31 PROCEDURE — 97110 THERAPEUTIC EXERCISES: CPT

## 2018-07-31 PROCEDURE — 97535 SELF CARE MNGMENT TRAINING: CPT

## 2018-07-31 NOTE — OP THERAPY DAILY TREATMENT
"  Outpatient Occupational Therapy  DAILY TREATMENT     [facility] Occupational Therapy [address].  [address]  [place] [address]  Phone:  [phone]  Fax:  [phone]    Date: 07/31/2018    Patient: Mary Martinez  YOB: 1970  MRN: [id number]     Time Calculation  Start time: 0800  Stop time: 0830 Time Calculation (min): 30 minutes     Chief Complaint: Hand Problem (left hand weakness)    Visit #: 5    SUBJECTIVE: \"I figured out how to take off my shirt with my spork\", \"This Dycem is life changing\"    OBJECTIVE:  Current objective measures:   Left  strength: 25 lbs        Therapeutic Exercises (CPT 50984):     1. Bilateral hands    Therapeutic Exercise Summary:  Left hand intrinsic hand strengthening using foam ring for adduction with focus on maintaining digits towards neutral alignment, right hand thumb adduction and RH  strengthening.   HEP updated with good understanding.    Therapeutic Treatments and Modalities:    1. Self Care ADL Training (CPT 62996)    Therapeutic Treatments and Modalities Summary: Problem solved ability to grasp bottle with left vs right, applied Dycem over large bottle and pt able to use either hand to bring bottle to mouth separately and with both hands.  Applied dycem to smart phone with improved ability to grasp and bring to ear.  Pt given information to purchase roll of Dycem for multiple uses at home.    Time-based treatments/modalities:  Therapeutic exercise minutes (CPT 20156): 10 minutes  Self-care/ADL training minutes (CPT 98981): 20 minutes      ASSESSMENT:   Response to treatment: Pt responding very well to application of Dycem to water bottle and phone to improve ability to grasp and maintain a cylindrical grasp with either hand without slippage.  Pt very excited regarding the potential use of this non-slip material with other ADL/IADL objects.  Pt's hand strength improved.  HEP updated with good " understanding.    PLAN/RECOMMENDATIONS:   Plan for treatment: therapy treatment to continue next visit.  Planned interventions for next visit: continue with current treatment, orthotic training (CPT 27991), self care ADL training (CPT 33553), therapeutic activities (CPT 94551) and therapeutic exercise (CPT 61302)

## 2018-08-02 ENCOUNTER — OFFICE VISIT (OUTPATIENT)
Dept: PHYSICAL MEDICINE AND REHAB | Facility: MEDICAL CENTER | Age: 48
End: 2018-08-02
Payer: MEDICAID

## 2018-08-02 ENCOUNTER — OCCUPATIONAL THERAPY (OUTPATIENT)
Dept: OCCUPATIONAL THERAPY | Facility: REHABILITATION | Age: 48
End: 2018-08-02
Attending: PHYSICAL MEDICINE & REHABILITATION
Payer: MEDICAID

## 2018-08-02 VITALS
OXYGEN SATURATION: 95 % | BODY MASS INDEX: 23.86 KG/M2 | DIASTOLIC BLOOD PRESSURE: 78 MMHG | TEMPERATURE: 97.4 F | HEART RATE: 100 BPM | WEIGHT: 134.7 LBS | SYSTOLIC BLOOD PRESSURE: 100 MMHG

## 2018-08-02 DIAGNOSIS — F11.90 CHRONIC, CONTINUOUS USE OF OPIOIDS: ICD-10-CM

## 2018-08-02 DIAGNOSIS — S68.411S AMPUTATION OF RIGHT HAND, SEQUELA (HCC): ICD-10-CM

## 2018-08-02 DIAGNOSIS — M21.942 ACQUIRED DEFORMITY OF LEFT HAND: ICD-10-CM

## 2018-08-02 DIAGNOSIS — M25.562 PAIN IN BOTH KNEES, UNSPECIFIED CHRONICITY: ICD-10-CM

## 2018-08-02 DIAGNOSIS — Z74.09 IMPAIRED MOBILITY AND ADLS: ICD-10-CM

## 2018-08-02 DIAGNOSIS — M19.019 SHOULDER ARTHRITIS: ICD-10-CM

## 2018-08-02 DIAGNOSIS — S63.219A SUBLUXATION OF METACARPOPHALANGEAL JOINT OF FINGER, INITIAL ENCOUNTER: ICD-10-CM

## 2018-08-02 DIAGNOSIS — M53.2X1 ATLANTOAXIAL INSTABILITY: ICD-10-CM

## 2018-08-02 DIAGNOSIS — M05.79 RHEUMATOID ARTHRITIS INVOLVING MULTIPLE SITES WITH POSITIVE RHEUMATOID FACTOR (HCC): ICD-10-CM

## 2018-08-02 DIAGNOSIS — Z78.9 IMPAIRED MOBILITY AND ADLS: ICD-10-CM

## 2018-08-02 DIAGNOSIS — R29.898 LEFT HAND WEAKNESS: ICD-10-CM

## 2018-08-02 DIAGNOSIS — M21.942 HAND DEFORMITY, ACQUIRED, LEFT: ICD-10-CM

## 2018-08-02 DIAGNOSIS — M25.561 PAIN IN BOTH KNEES, UNSPECIFIED CHRONICITY: ICD-10-CM

## 2018-08-02 PROCEDURE — 97110 THERAPEUTIC EXERCISES: CPT

## 2018-08-02 PROCEDURE — 99214 OFFICE O/P EST MOD 30 MIN: CPT | Performed by: PHYSICAL MEDICINE & REHABILITATION

## 2018-08-02 RX ORDER — OXYCODONE HYDROCHLORIDE AND ACETAMINOPHEN 5; 325 MG/1; MG/1
1 TABLET ORAL EVERY 6 HOURS PRN
Qty: 120 TAB | Refills: 0 | Status: SHIPPED | OUTPATIENT
Start: 2018-08-02 | End: 2018-08-31 | Stop reason: SDUPTHER

## 2018-08-02 ASSESSMENT — PAIN SCALES - GENERAL: PAINLEVEL: 8=MODERATE-SEVERE PAIN

## 2018-08-02 NOTE — PROGRESS NOTES
"Follow up patient note  Pain Medicine, Interventional spine and sports physiatry, Physical medicine rehabilitation      Chief complaint:   Joint pain      HISTORY    Please see new patient note dated 06/08/2018 by Dr Kerr,  for more details.     HPI  Patient identification: Mary Martinez 48 y.o. female returns for follow-up of her pain related to rheumatoid arthritis.      Interval history: She reports that she has been making great progress with OT.  Three things she can now do: Pull off her own shirt, drink from a cup, and tie her own shoes.  Neck is somewhat painful.  Aching pain in her arms.  Left shoulder pain continues.  She has been told that she needs to have a shoulder replacement.    She has noted that her neck does \"pop\" a lot.  It is not painful, she does note that \"everything pops.\"    Since the last visit, she has consulted with Dr. Valdovinos and had MRI cervical spine with flexion and extension films performed.  Right now, follow-up is planned for October 2018, unless they call her sooner.    Previous review of records: Records from [facility] treatment with Dr. Rosen outlining course and ultimately amputations of the right hand in 2017     ROS Red Flags :   Fever, Chills, Sweats: Denies  Involuntary Weight Loss: Denies  Bowel/Bladder Incontinence: Denies      PMHx:   Past Medical History:   Diagnosis Date   • ASTHMA     inhalers prn   • Dental disorder     upper partial   • Pain     everywhere   • Psychiatric problem     anxiety   • Rheumatoid arthritis(714.0) 2003       PSHx:   Past Surgical History:   Procedure Laterality Date   • FINGER ARTHROPLASTY Right 6/5/2017    Procedure: FINGER ARTHROPLASTY - LONG, RING AND SMALL VOLAR PLATE;  Surgeon: Lobo Rosen M.D.;  Location: SURGERY SAME DAY [facility];  Service:    • FINGER AMPUTATION  6/5/2017    Procedure: FINGER AMPUTATION - LONG, RING AND SMALL AT THE PROXIMAL INTERPHALANGEAL JOINT;  Surgeon: Lobo Rosen M.D.;  Location: " SURGERY SAME DAY [facility];  Service:    • FINGER ARTHROPLASTY Right 4/17/2017    Procedure: FINGER ARTHROPLASTY ;  Surgeon: Lobo Rosen M.D.;  Location: SURGERY SAME DAY [facility];  Service:    • FINGER AMPUTATION Right 9/14/2016    Procedure: FINGER AMPUTATION INDEX;  Surgeon: Lobo Rosen M.D.;  Location: SURGERY SAME DAY [facility];  Service:    • IRRIGATION & DEBRIDEMENT ORTHO Right 9/11/2016    Procedure: IRRIGATION & DEBRIDEMENT ORTHO - right index finger;  Surgeon: Madhu Rosen M.D.;  Location: SURGERY [facility];  Service:    • PIP ARTHRODESIS Right 8/29/2016    Procedure: RE-DO INDEX FINGER PROXIMAL INTERPHALANGEAL ARTHRODESIS;  Surgeon: Lobo Rosen M.D.;  Location: SURGERY SAME DAY [facility];  Service:    • BONE GRAFT Right 8/29/2016    Procedure: BONE GRAFT - DISTAL RADIUS ;  Surgeon: Lobo Rosen M.D.;  Location: SURGERY SAME DAY [facility];  Service:    • ARTHRODESIS Right 5/6/2016    Procedure: ARTHRODESIS INDEX FINGER PROXIMAL INTERPHALANGEAL;  Surgeon: Lobo Rosen M.D.;  Location: SURGERY SAME DAY [facility];  Service:    • FOOT RECONSTRUCTION RHEUMATIC Left 7/29/2015    Procedure: FOOT RECONSTRUCTION RHEUMATIC;  Surgeon: Heriberto Alves M.D.;  Location: [facility];  Service:    • TOE FUSION Right 5/27/2015    Procedure: TOE FUSION 1ST METATARSALPHALANGEAL JOINT;  Surgeon: Heriberto Alves M.D.;  Location: [facility];  Service:    • BONE SPUR EXCISION  5/27/2015    Procedure: BONE SPUR EXCISION METATARSAL HEAD 2-3;  Surgeon: Heriberto Alves M.D.;  Location: SURGERY [facility];  Service:    • HAMMERTOE CORRECTION  5/27/2015    Procedure: HAMMERTOE CORRECTION AND SOFT TISSUE RE-ALINGMENT 2-3 ;  Surgeon: Heriberto Alves M.D.;  Location: [facility];  Service:    • EMANUEL BY LAPAROSCOPY  9/27/2011    Performed by VERO WRIGHT at [facility]   • ABDOMINAL ABSCESS DRAINAGE   2011    Performed by VERO WRIGHT at SURGERY [facility] ORS   • OTHER  1982    tonsils   • APPENDECTOMY     • CHOLECYSTECTOMY     • GYN SURGERY      C section X 4   • OTHER ABDOMINAL SURGERY      small colon block   • PB  DELIVERY ONLY      x 4   • TONSILLECTOMY     • WRIST ORIF         Family history   Denies neuromuscular disease  History reviewed. No pertinent family history.      Medications:   Current Outpatient Prescriptions   Medication   • oxyCODONE-acetaminophen (PERCOCET) 5-325 MG Tab   • busPIRone (BUSPAR) 15 MG tablet   • gabapentin (NEURONTIN) 600 MG tablet   • PROTONIX 40 MG Recon Soln   • paroxetine (PAXIL) 40 MG tablet   • Etanercept (ENBREL) 50 MG/ML Solution Prefilled Syringe   • paroxetine (PAXIL) 20 MG Tab   • albuterol (VENTOLIN OR PROVENTIL) 108 (90 BASE) MCG/ACT AERS inhalation aerosol     No current facility-administered medications for this visit.        Allergies:   Allergies   Allergen Reactions   • Nitrous Oxide Vomiting   • Penicillins Hives     Tolerates cephalosporins       Social Hx:   Social History     Social History   • Marital status:      Spouse name: N/A   • Number of children: N/A   • Years of education: N/A     Occupational History   • Not on file.     Social History Main Topics   • Smoking status: Current Every Day Smoker     Packs/day: 1.00     Years: 30.00     Types: Cigarettes   • Smokeless tobacco: Never Used      Comment: 1 ppd   • Alcohol use No   • Drug use: No   • Sexual activity: Not on file     Other Topics Concern   •  Service No   • Blood Transfusions Yes   • Caffeine Concern No   • Occupational Exposure No   • Hobby Hazards No   • Sleep Concern No   • Stress Concern Yes   • Weight Concern No   • Special Diet No   • Back Care No   • Exercise Yes   • Bike Helmet Yes   • Seat Belt Yes   • Self-Exams Yes     Social History Narrative    ** Merged History Encounter **              EXAMINATION     Physical Exam:   Vitals: Blood pressure  "100/78, pulse 100, temperature 36.3 °C (97.4 °F), weight 61.1 kg (134 lb 11.2 oz), last menstrual period 09/21/2011, SpO2 95 %.    Constitutional:   Body Habitus: Body mass index is 23.86 kg/m².  Cooperation: Fully cooperates with exam  Appearance: Well-groomed no disheveled, in no acute distress  Respiratory-  breathing comfortable on room air, no audible wheezing  Cardiovascular- capillary refills less than 2 seconds. No lower extremity edema is noted.   Psychiatric- alert and oriented ×3. Normal affect.   Musculoskeletal:  Decreased ROM of the shoulders, left greater than right. Crepitus and \"clunking\" of the left shoulder with active forward flexion.  MMT reveals 3/5 shoulder abduction on the left and 4/5 on the right, elbow flexion is 5-/5 on the right and 4/5 on the left, elbow extension is 5/5 bilaterally, wrist extension 4/5 bilaterally. Limited ability to determine MMT distally.  See below.  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  Unchanged.      MEDICAL DECISION MAKING    DATA    Labs: No new labs for review, requesting labs from [facility]       Imaging:   MRI cervical spine 07/31/2018:  Films reviewed.  These are my reads:  There is is note of motion at the atlantodental level with 5mm atlantodental inverval in flexion that reduces to 1-2 mm in extension.  Mild retrolisthesis of C4 on C5 and anterolisthesis of C5- on C6.  Disc extrusion at C6-7 with mild central canal stenosis    Xray left shoulder 2/5/2018 Humeral head is elevated.  Glenohumeral joint arthritis.    Reports reviewed:    MRI cervical spine 07/31/2018  1. Widening of the atlantodental interal in neutral and flexion positions with a 5mm space which reduces to 1-2 mm in extension  2. Degenerative changes with the disc extrusion at C6-7 " level causing mild canal stenosis    Xray cervical spine 07/06/2018: Atlantoaxial instability at C1-2     Xrays knees 07/06/2018  Left: Unremarkable  Right: Unremarkable             DIAGNOSIS   Visit Diagnoses     ICD-10-CM   1. Rheumatoid arthritis involving multiple sites with positive rheumatoid factor ([facility]) M05.79   2. Impaired mobility and ADLs Z74.09   3. Shoulder arthritis M19.019   4. Hand deformity, acquired, left M21.942   5. Amputation of right hand, sequela ([facility]) S68.411S   6. 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 Chronic, continuous use of opioids F11.90   8. Pain in both knees, unspecified chronicity M25.561    M25.562         ASSESSMENT and PLAN:     Mary Martinez 48 y.o. female with rheumatoid arthritis    Mary was seen today for follow-up.    Diagnoses and all orders for this visit:    Rheumatoid arthritis involving multiple sites with positive rheumatoid factor ([facility])  -     CONSENT FOR OPIATE PRESCRIPTION - on file  -     oxyCODONE-acetaminophen (PERCOCET) 5-325 MG Tab; Take 1 Tab by mouth every 8 hours as needed for Severe Pain for up to 30 days.    Impaired mobility and ADLs    Hand deformity, acquired, left    Amputation right hand, sequela    Chronic, continuous use of opioids    Atlantoaxial instability    Pain in both knees      Continue percocet for chronic pain related to above.    Discussed imaging results of her knees, which do not currently show significant joint destruction.    Continue with OT.  She has made great progress with meaningful improvements in ADL independence.      We discussed findings on cervical spine.  She will follow-up with Dr. Valdovinos's office.      No changes in conservative management of her left shoulder for now.  She has been evaluated and told she needed shoulder replacement. This seems reasonable, but currently, this is on hold.      Follow up: 1 month    Thank you for allowing me to participate in the care of this patient. If you have any questions  please not hesitate to contact me.      Total time: 25 minutes face-to-face time. I spent greater than 50% of the time for patient care and coordination on this date, including with the patient as per assessment and plan above.         Please note that this dictation was created using voice recognition software. I have made every reasonable attempt to correct obvious errors but there may be errors of grammar and content that I may have overlooked prior to finalization of this note.      Jayy Kerr MD  Interventional Spine and Sports Physiatry  Physical Medicine and Rehabilitation  [facility]

## 2018-08-06 ENCOUNTER — OCCUPATIONAL THERAPY (OUTPATIENT)
Dept: OCCUPATIONAL THERAPY | Facility: REHABILITATION | Age: 48
End: 2018-08-06
Attending: PHYSICAL MEDICINE & REHABILITATION
Payer: MEDICAID

## 2018-08-06 DIAGNOSIS — M21.942 ACQUIRED DEFORMITY OF LEFT HAND: ICD-10-CM

## 2018-08-06 DIAGNOSIS — R29.898 LEFT HAND WEAKNESS: ICD-10-CM

## 2018-08-06 DIAGNOSIS — S63.219A SUBLUXATION OF METACARPOPHALANGEAL JOINT OF FINGER, INITIAL ENCOUNTER: ICD-10-CM

## 2018-08-06 PROCEDURE — 97110 THERAPEUTIC EXERCISES: CPT

## 2018-08-06 PROCEDURE — G8985 CARRY GOAL STATUS: HCPCS | Mod: CK

## 2018-08-06 PROCEDURE — G8986 CARRY D/C STATUS: HCPCS | Mod: CJ

## 2018-08-06 NOTE — OP THERAPY DAILY TREATMENT
"  Outpatient Occupational Therapy  DAILY TREATMENT     [facility] Occupational Therapy [address]  [address]  [place] [address]  Phone:  [phone]  Fax:  [phone]    Date: 08/06/2018    Patient: Mary Martinez  YOB: 1970  MRN: [id number]     Time Calculation  Start time: 0100  Stop time: 0130 Time Calculation (min): 30 minutes     Chief Complaint: Hand Weakness (left, deformity)    Visit #: 7    SUBJECTIVE: \"My left hand is a little swollen\"    OBJECTIVE:  Current objective measures:   Left  strength: 29 lbs  AROM/strength: Left digit flexion, abduction/adduction, and extension of left IF WFL. Digits 3-5 with active extension from PIP to DIP joints via lumbricals, no activation of EDC for extension at MCP joints.  strength 3+/5, Left thumb strength: EPB 3/5, EPL 3-/5, otherwise 3/5 strength except for EDC 0/5.  Ulnar deviation left hand splint: wearing majority of time including nighttime.  Able to independently don/doff.  ADLs: Mod I ADLs except assist to wash hair and tie hair back.  Mod I IADLs.  Using Dycem for left hand gripping and stabilization of objects.  HEP: independent    OT Functional Assessment Tool Used: Quick Dash  OT Functional Assessment Score: 29.55% Disability  Quickdash General Total Score: 29.55     Therapeutic Exercises (CPT 49078):     1. Bilateral hands    Therapeutic Exercise Summary:  Reviewed current HEP of theraputty, strengthening with foam tube, radial deviation, gross abduction/adduction with alignment towards neutral, bilateral thumb isometric and isotonic strengthening, towel grasp/release, and added palmar abduction.  Updated written HEP with good understanding.  Demonstrated use of rocker knife for 1-handed cutting of food items, pt may purchase as she has purchased Dycem.  Re-tested areas above.      Time-based treatments/modalities:  Therapeutic exercise minutes (CPT 23204): 30 minutes        ASSESSMENT:   Response to " treatment: Pt has actively participated in skilled OT program and has made excellent progress to fully meet LTGs.  Pt is modified independent with her ADLs and IADLs with the exception hair care.  She has made steady gains with left hand strength, flexibility, and function for her daily routine with decreased reports of pain.  Pt is independent positioning using ulnar deviation splint and verbalizes good understanding of joint protection and joint alignment.  Pt is independent with her HEP and is safe for d/c.    PLAN/RECOMMENDATIONS:   Plan for treatment: discharge patient due to accomplished goals.  Planned interventions for next visit: Pt is safe to d/c to HEP.  D/C OT.

## 2018-08-07 NOTE — OP THERAPY DISCHARGE SUMMARY
Outpatient Occupational Therapy  DISCHARGE SUMMARY NOTE    [facility] Occupational Therapy [address].  [address]  [place] [address]  Phone:  [phone]  Fax:  [phone]    Date of Visit: 08/06/2018    Patient: Mary Martinez  YOB: 1970  MRN: [id number]     Referring Provider: Jayy Kerr M.D.  [id number]  [place] [address]   Referring Diagnosis: Other symptoms and signs involving the musculoskeletal system [R29.898];Subluxation of metacarpophalangeal joint of unspecified finger, initial encounter [S63.219A];Unspecified acquired deformity of hand, left hand [M21.942];Other reduced mobility [Z74.09]     Occupational Therapy Occurrence Codes    Date of onset of impairment:  7/15/06   Date of occupational therapy care plan established or reviewed:  7/5/18   Date of occupational therapy treatment started:  7/5/18          Functional Limitation G-Codes and Severity Modifiers  OT Functional Assessment Tool Used: Quick Dash  OT Functional Assessment Score: 29.55% Disability  Quickdash General Total Score: 29.55   Goal: Carry: Goal  (): CK   Discharge: Carry: Discharge (): CJ       Your patient is being discharged from Occupational Therapy with the following comments:   · Goals met    Comments:  Pt has actively participated in skilled OT program and has made excellent progress to fully meet LTGs.  Pt is modified independent with her ADLs and IADLs with the exception hair care.  She has made steady gains with left hand strength, flexibility, and function for her daily routine with decreased reports of pain.  Pt is independent positioning using ulnar deviation splint and verbalizes good understanding of joint protection and joint alignment.  Pt is independent with her HEP and is safe for d/c.    Limitations Remaining:  See daily note from 8/6/18 for details.    Recommendations:  D/C to HEP.  D/C OT.    Paul Interiano MS,OTR/L    Date: 8/6/2018

## 2018-08-08 ENCOUNTER — APPOINTMENT (OUTPATIENT)
Dept: OCCUPATIONAL THERAPY | Facility: REHABILITATION | Age: 48
End: 2018-08-08
Attending: PHYSICAL MEDICINE & REHABILITATION
Payer: MEDICAID

## 2018-08-13 ENCOUNTER — APPOINTMENT (OUTPATIENT)
Dept: OCCUPATIONAL THERAPY | Facility: REHABILITATION | Age: 48
End: 2018-08-13
Attending: PHYSICAL MEDICINE & REHABILITATION
Payer: MEDICAID

## 2018-08-29 ENCOUNTER — PHYSICAL THERAPY (OUTPATIENT)
Dept: PHYSICAL THERAPY | Facility: REHABILITATION | Age: 48
End: 2018-08-29
Attending: NEUROLOGICAL SURGERY
Payer: MEDICAID

## 2018-08-29 DIAGNOSIS — G89.29 NECK PAIN, CHRONIC: ICD-10-CM

## 2018-08-29 DIAGNOSIS — M54.2 NECK PAIN, CHRONIC: ICD-10-CM

## 2018-08-29 PROCEDURE — 97162 PT EVAL MOD COMPLEX 30 MIN: CPT

## 2018-08-29 ASSESSMENT — ENCOUNTER SYMPTOMS
EXACERBATED BY: TWISTING
PAIN SCALE: 8
ALLEVIATING FACTORS: NOTHING
POSTURAL HEADACHE: 1
PAIN TIMING: ALL DAY
PAIN SCALE AT LOWEST: 8
PAIN SCALE AT HIGHEST: 9
QUALITY: BURNING

## 2018-08-29 NOTE — OP THERAPY EVALUATION
Outpatient Physical Therapy  INITIAL EVALUATION    [facility] Physical Therapy [address].  [address]  [place] [address]  Phone:  [phone]  Fax:  [phone]    Date of Evaluation: 2018    Patient: Mary Martinez  YOB: 1970  MRN: [id number]     Referring Provider: Dao Valdovinos M.D.  [address] [place] [address]   Referring Diagnosis Other cervical disc degeneration, unspecified cervical region [M50.30]     Time Calculation  Start time: 1400  Stop time: 1430 Time Calculation (min): 30 minutes     Physical Therapy Occurrence Codes    Date of onset of impairment:  13   Date physical therapy care plan established or reviewed:  18   Date physical therapy treatment started:  18          Chief Complaint: Neck Problem    Visit Diagnoses     ICD-10-CM   1. Neck pain, chronic M54.2    G89.29         Subjective:   History of Present Illness:     Date of onset:  2013    Mechanism of injury:  Long history of neck pain that has progressively worsened. Patient reports she has instability in her cervical spine.  She reports she saw Dr. Valdovinos who want to recheck in February to determine if she needs surgery. Per Dr. Valdovinos, she is unable to lift anything greater than 20 pounds and to avoid any activities that may result in an impact. Patient reports she was diagnosed with RA in .    Headaches:  postural headache  Sleep disturbance:  Interrupted sleep  Pain:     Current pain ratin    At best pain ratin    At worst pain ratin    Quality:  Burning    Pain timing:  All day    Relieving factors:  Nothing    Aggravating factors:  Twisting    Pain Comments::  Patient reports pain has worsened over the last 3 months.   Social Support:     Lives in:  One-story house    Lives with:  Young children  Hand dominance:  Right  Diagnostic Tests:     X-ray: abnormal      MRI studies: abnormal      Diagnostic Tests Comments:  Patient demonstrates Atlanto  axial instability. Refer to MRI for specifics.   Treatments:     Previous treatment:  Occupational therapy    Current treatment:  Occupational therapy    Treatment Comments:  Cervical epidural is scheduled on 9/6/18.    Activities of Daily Living:     Patient reported ADL status: Patient is not currently. She is home with 4 of her 5 children. Patient's 16 year old has Down Syndrome.   Patient Goals:     Other patient goals:  To stay out of a wheelchair.        Past Medical History:   Diagnosis Date   • ASTHMA     inhalers prn   • Dental disorder     upper partial   • Pain     everywhere   • Psychiatric problem     anxiety   • Rheumatoid arthritis(714.0) 2003     Past Surgical History:   Procedure Laterality Date   • FINGER ARTHROPLASTY Right 6/5/2017    Procedure: FINGER ARTHROPLASTY - LONG, RING AND SMALL VOLAR PLATE;  Surgeon: Lobo Rosen M.D.;  Location: SURGERY SAME DAY [facility];  Service:    • FINGER AMPUTATION  6/5/2017    Procedure: FINGER AMPUTATION - LONG, RING AND SMALL AT THE PROXIMAL INTERPHALANGEAL JOINT;  Surgeon: Lobo Rosen M.D.;  Location: SURGERY SAME DAY [facility];  Service:    • FINGER ARTHROPLASTY Right 4/17/2017    Procedure: FINGER ARTHROPLASTY ;  Surgeon: Lobo Rosen M.D.;  Location: SURGERY SAME DAY [facility];  Service:    • FINGER AMPUTATION Right 9/14/2016    Procedure: FINGER AMPUTATION INDEX;  Surgeon: Lobo Rosen M.D.;  Location: SURGERY SAME DAY [facility];  Service:    • IRRIGATION & DEBRIDEMENT ORTHO Right 9/11/2016    Procedure: IRRIGATION & DEBRIDEMENT ORTHO - right index finger;  Surgeon: Madhu Rosen M.D.;  Location: [facility];  Service:    • PIP ARTHRODESIS Right 8/29/2016    Procedure: RE-DO INDEX FINGER PROXIMAL INTERPHALANGEAL ARTHRODESIS;  Surgeon: Lobo Rosen M.D.;  Location: SURGERY SAME DAY [facility];  Service:    • BONE GRAFT Right 8/29/2016    Procedure: BONE GRAFT - DISTAL RADIUS ;   Surgeon: Lobo Rosen M.D.;  Location: SURGERY SAME DAY [facility];  Service:    • ARTHRODESIS Right 2016    Procedure: ARTHRODESIS INDEX FINGER PROXIMAL INTERPHALANGEAL;  Surgeon: Lobo Rosen M.D.;  Location: SURGERY SAME DAY [facility];  Service:    • FOOT RECONSTRUCTION RHEUMATIC Left 2015    Procedure: FOOT RECONSTRUCTION RHEUMATIC;  Surgeon: Heriberto Alves M.D.;  Location: SURGERY [facility];  Service:    • TOE FUSION Right 2015    Procedure: TOE FUSION 1ST METATARSALPHALANGEAL JOINT;  Surgeon: Heriberto Alves M.D.;  Location: SURGERY [facility];  Service:    • BONE SPUR EXCISION  2015    Procedure: BONE SPUR EXCISION METATARSAL HEAD 2-3;  Surgeon: Heriberto Alves M.D.;  Location: SURGERY [facility];  Service:    • HAMMERTOE CORRECTION  2015    Procedure: HAMMERTOE CORRECTION AND SOFT TISSUE RE-ALINGMENT 2-3 ;  Surgeon: Heriberto Alves M.D.;  Location: SURGERY [facility];  Service:    • EMANUEL BY LAPAROSCOPY  2011    Performed by VERO WRIGHT at [facility]   • ABDOMINAL ABSCESS DRAINAGE  2011    Performed by VERO WRIGHT at [facility]   • OTHER  1982    tonsils   • APPENDECTOMY     • CHOLECYSTECTOMY     • GYN SURGERY      C section X 4   • OTHER ABDOMINAL SURGERY      small colon block   • PB  DELIVERY ONLY      x 4   • TONSILLECTOMY     • WRIST ORIF       Social History   Substance Use Topics   • Smoking status: Current Every Day Smoker     Packs/day: 1.00     Years: 30.00     Types: Cigarettes   • Smokeless tobacco: Never Used      Comment: 1 ppd   • Alcohol use No     Family and Occupational History     Social History   • Marital status:      Spouse name: N/A   • Number of children: N/A   • Years of education: N/A       Objective     Postural Observations    Additional Postural Observation Details  Patient presents to therapy without AD. Patient states she falls frequently. Based on  her reports of falls and on patient's imaging which reveals atlanto axial instability, therapist recommends she use her walker that she has.     Shoulder Screen    Shoulder range of motion within functional limits with the following exceptions: Patient has limited flexion and abduction bilaterally which patient reports she has had for a long time. Patient is missing her fingers on her right hand and has her right thumb intact.    Shoulder strength within functional limits with the following exceptions: Decreased bicep and deltoid strength.     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 Patient presents with cervical pain and limited UE function.  Patient's MRI reveals atlanto axial instability and patient has a 20lb. Lifting restriction. Patient would benefit from skilled PT with a focus on stabilization to attempt to decrease pain and improve function.   Barriers to therapy:  Comorbidities  Prognosis: fair    Goals:   Short Term Goals:   1. Patient will report performing HEP daily.   Short term goal time span:  2-4 weeks      Long Term Goals:    1. Patient will score <50% impairment on the Quick Dash.  2. Patient will be independent in upgraded HEP.   3. Patient will report decreased reports of headaches by 10%.  Long term goal time span:  6-8 weeks    Plan:   Therapy options:  Physical therapy treatment to continue  Planned therapy interventions:  E Stim Unattended (CPT 85734) and  Therapeutic Exercise (CPT 15270)  Frequency:  1x week  Duration in weeks:  6  Discussed with:  Patient  Plan details:  1 x per week x 6 weeks.       Functional Limitation G-Codes and Severity Modifiers      Current:     Goal:       Referring provider co-signature:  I have reviewed this plan of care and my co-signature certifies the need for services.  Certification Dates:   From 8/29/18     To 10/24/18    Physician Signature: ________________________________ Date: ______________

## 2018-08-31 ENCOUNTER — TELEPHONE (OUTPATIENT)
Dept: PHYSICAL MEDICINE AND REHAB | Facility: MEDICAL CENTER | Age: 48
End: 2018-08-31

## 2018-08-31 ENCOUNTER — OFFICE VISIT (OUTPATIENT)
Dept: PHYSICAL MEDICINE AND REHAB | Facility: MEDICAL CENTER | Age: 48
End: 2018-08-31
Payer: MEDICAID

## 2018-08-31 VITALS
WEIGHT: 130.73 LBS | HEIGHT: 63 IN | BODY MASS INDEX: 23.16 KG/M2 | OXYGEN SATURATION: 96 % | TEMPERATURE: 97.6 F | DIASTOLIC BLOOD PRESSURE: 80 MMHG | HEART RATE: 100 BPM | SYSTOLIC BLOOD PRESSURE: 118 MMHG

## 2018-08-31 DIAGNOSIS — M21.942 HAND DEFORMITY, ACQUIRED, LEFT: ICD-10-CM

## 2018-08-31 DIAGNOSIS — Z78.9 IMPAIRED MOBILITY AND ADLS: ICD-10-CM

## 2018-08-31 DIAGNOSIS — S63.219A: ICD-10-CM

## 2018-08-31 DIAGNOSIS — Z74.09 IMPAIRED MOBILITY AND ADLS: ICD-10-CM

## 2018-08-31 DIAGNOSIS — F11.90 CHRONIC, CONTINUOUS USE OF OPIOIDS: ICD-10-CM

## 2018-08-31 DIAGNOSIS — M54.2 CERVICALGIA: ICD-10-CM

## 2018-08-31 DIAGNOSIS — M53.2X1 ATLANTOAXIAL INSTABILITY: ICD-10-CM

## 2018-08-31 DIAGNOSIS — M19.019 SHOULDER ARTHRITIS: ICD-10-CM

## 2018-08-31 DIAGNOSIS — M05.79 RHEUMATOID ARTHRITIS INVOLVING MULTIPLE SITES WITH POSITIVE RHEUMATOID FACTOR (HCC): ICD-10-CM

## 2018-08-31 PROCEDURE — 99214 OFFICE O/P EST MOD 30 MIN: CPT | Performed by: PHYSICAL MEDICINE & REHABILITATION

## 2018-08-31 RX ORDER — OXYCODONE HYDROCHLORIDE AND ACETAMINOPHEN 5; 325 MG/1; MG/1
TABLET ORAL
Qty: 120 TAB | Refills: 0 | Status: SHIPPED | OUTPATIENT
Start: 2018-09-30 | End: 2018-10-30 | Stop reason: SDUPTHER

## 2018-08-31 RX ORDER — OXYCODONE HYDROCHLORIDE AND ACETAMINOPHEN 5; 325 MG/1; MG/1
1 TABLET ORAL EVERY 6 HOURS PRN
Qty: 120 TAB | Refills: 0 | Status: SHIPPED | OUTPATIENT
Start: 2018-08-31 | End: 2018-09-30

## 2018-08-31 ASSESSMENT — PAIN SCALES - GENERAL: PAINLEVEL: 7=MODERATE-SEVERE PAIN

## 2018-08-31 NOTE — PROGRESS NOTES
Follow up patient note  Pain Medicine, Interventional spine and sports physiatry, Physical medicine rehabilitation      Chief complaint:   Joint pain      HISTORY    Please see new patient note dated 06/08/2018 by Dr Kerr,  for more details.     HPI  Patient identification: Mary Martinez 48 y.o. female returns for follow-up of her pain related to rheumatoid arthritis.      Interval history: She reports that she has been seen by Dr. Valdovinos and he has referred her elsewhere for a cervical epidural.  He does not recommend surgery.  She is not sure where this injection will be, but it scheduled for sometime in the next week or two.    She has noted some neck pain and radiation into the right arm since the last visit.  Initially, she was only noting popping in her neck.  PT has been scheduled to start for her cervical spine.  No change in strength of her upper extremities.  She has bilateral shoulder pain and limitations.  She has been told that she needs shoulder replacement, but nothing is planned for now.    Pain medications help to maintain her function, she reports no side effects.  Bowel movements continue to be regular.    OT has been completed, she reports that she can still pull off her own shirt, drink from a cup, and tie her own shoes.  She feels pretty happy about this.    Previous review of records: Records from [facility] treatment with Dr. Rosen outlining course and ultimately amputations of the right hand in 2017     ROS Red Flags :   Fever, Chills, Sweats: Denies  Involuntary Weight Loss: Denies  Bowel/Bladder Incontinence: Denies      PMHx:   Past Medical History:   Diagnosis Date   • ASTHMA     inhalers prn   • Dental disorder     upper partial   • Pain     everywhere   • Psychiatric problem     anxiety   • Rheumatoid arthritis(714.0) 2003       PSHx:   Past Surgical History:   Procedure Laterality Date   • FINGER ARTHROPLASTY Right 6/5/2017    Procedure: FINGER ARTHROPLASTY - LONG, RING AND  SMALL VOLAR PLATE;  Surgeon: Lobo Rosen M.D.;  Location: SURGERY SAME DAY [facility];  Service:    • FINGER AMPUTATION  6/5/2017    Procedure: FINGER AMPUTATION - LONG, RING AND SMALL AT THE PROXIMAL INTERPHALANGEAL JOINT;  Surgeon: Lobo Rosen M.D.;  Location: SURGERY SAME DAY [facility];  Service:    • FINGER ARTHROPLASTY Right 4/17/2017    Procedure: FINGER ARTHROPLASTY ;  Surgeon: Lobo Rosen M.D.;  Location: SURGERY SAME DAY [facility];  Service:    • FINGER AMPUTATION Right 9/14/2016    Procedure: FINGER AMPUTATION INDEX;  Surgeon: Lobo Rosen M.D.;  Location: SURGERY SAME DAY [facility];  Service:    • IRRIGATION & DEBRIDEMENT ORTHO Right 9/11/2016    Procedure: IRRIGATION & DEBRIDEMENT ORTHO - right index finger;  Surgeon: Madhu Rosen M.D.;  Location: [facility];  Service:    • PIP ARTHRODESIS Right 8/29/2016    Procedure: RE-DO INDEX FINGER PROXIMAL INTERPHALANGEAL ARTHRODESIS;  Surgeon: Lobo Rosen M.D.;  Location: SURGERY SAME DAY [facility];  Service:    • BONE GRAFT Right 8/29/2016    Procedure: BONE GRAFT - DISTAL RADIUS ;  Surgeon: Lobo Rosen M.D.;  Location: SURGERY SAME DAY [facility];  Service:    • ARTHRODESIS Right 5/6/2016    Procedure: ARTHRODESIS INDEX FINGER PROXIMAL INTERPHALANGEAL;  Surgeon: Lobo Rosen M.D.;  Location: SURGERY SAME DAY [facility];  Service:    • FOOT RECONSTRUCTION RHEUMATIC Left 7/29/2015    Procedure: FOOT RECONSTRUCTION RHEUMATIC;  Surgeon: Heriberto Alves M.D.;  Location: [facility];  Service:    • TOE FUSION Right 5/27/2015    Procedure: TOE FUSION 1ST METATARSALPHALANGEAL JOINT;  Surgeon: Heriberto Alves M.D.;  Location: SURGERY [facility];  Service:    • BONE SPUR EXCISION  5/27/2015    Procedure: BONE SPUR EXCISION METATARSAL HEAD 2-3;  Surgeon: Heriberto Alves M.D.;  Location: [facility];  Service:    • HAMMERTOE CORRECTION   2015    Procedure: HAMMERTOE CORRECTION AND SOFT TISSUE RE-ALINGMENT 2-3 ;  Surgeon: Heriberto Alves M.D.;  Location: SURGERY [facility];  Service:    • EMANUEL BY LAPAROSCOPY  2011    Performed by VERO WRIGHT at SURGERY [facility]   • ABDOMINAL ABSCESS DRAINAGE  2011    Performed by VERO WRIGHT at SURGERY [facility]   • OTHER  1982    tonsils   • APPENDECTOMY     • CHOLECYSTECTOMY     • GYN SURGERY      C section X 4   • OTHER ABDOMINAL SURGERY      small colon block   • PB  DELIVERY ONLY      x 4   • TONSILLECTOMY     • WRIST ORIF         Family history   Denies neuromuscular disease  History reviewed. No pertinent family history.      Medications:   Current Outpatient Prescriptions   Medication   • oxyCODONE-acetaminophen (PERCOCET) 5-325 MG Tab   • [START ON 2018] oxyCODONE-acetaminophen (PERCOCET) 5-325 MG Tab   • busPIRone (BUSPAR) 15 MG tablet   • gabapentin (NEURONTIN) 600 MG tablet   • PROTONIX 40 MG Recon Soln   • paroxetine (PAXIL) 40 MG tablet   • Etanercept (ENBREL) 50 MG/ML Solution Prefilled Syringe   • paroxetine (PAXIL) 20 MG Tab   • albuterol (VENTOLIN OR PROVENTIL) 108 (90 BASE) MCG/ACT AERS inhalation aerosol     No current facility-administered medications for this visit.        Allergies:   Allergies   Allergen Reactions   • Nitrous Oxide Vomiting   • Penicillins Hives     Tolerates cephalosporins       Social Hx:   Social History     Social History   • Marital status:      Spouse name: N/A   • Number of children: N/A   • Years of education: N/A     Occupational History   • Not on file.     Social History Main Topics   • Smoking status: Current Every Day Smoker     Packs/day: 1.00     Years: 30.00     Types: Cigarettes   • Smokeless tobacco: Never Used      Comment: 1 ppd   • Alcohol use No   • Drug use: No   • Sexual activity: Not on file     Other Topics Concern   •  Service No   • Blood Transfusions Yes   • Caffeine Concern  "No   • Occupational Exposure No   • Hobby Hazards No   • Sleep Concern No   • Stress Concern Yes   • Weight Concern No   • Special Diet No   • Back Care No   • Exercise Yes   • Bike Helmet Yes   • Seat Belt Yes   • Self-Exams Yes     Social History Narrative    ** Merged History Encounter **              EXAMINATION     Physical Exam:   Vitals: Blood pressure 118/80, pulse 100, temperature 36.4 °C (97.6 °F), height 1.6 m (5' 3\"), weight 59.3 kg (130 lb 11.7 oz), last menstrual period 09/21/2011, SpO2 96 %.    Constitutional:   Body Habitus: Body mass index is 23.16 kg/m².  Cooperation: Fully cooperates with exam  Appearance: Well-groomed no disheveled, in no acute distress  Respiratory-  breathing comfortable on room air, no audible wheezing  Cardiovascular-  No lower extremity edema is noted.   Psychiatric- alert and oriented ×3. Normal affect.   Musculoskeletal:  Decreased ROM of the shoulders, left greater than right. Crepitus and \"clunking\" of the left shoulder with active forward flexion.  MMT: 3/5 shoulder abduction on the left and 4/5 on the right, elbow flexion is 5-/5 on the right and 4/5 on the left, elbow extension is 5/5 bilaterally, wrist extension 4/5 bilaterally. Limited ability to determine MMT distally.   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  Unchanged.      MEDICAL DECISION MAKING    DATA    Labs: No new labs for review, requesting labs from [facility]       Imaging:  No new imaging  MRI cervical spine 07/31/2018:  Films reviewed.  These are my reads:  There is is note of motion at the atlantodental level with 5mm atlantodental inverval in flexion that reduces to 1-2 mm in extension.  Mild retrolisthesis of C4 on C5 and anterolisthesis of C5- on C6.  Disc extrusion at C6-7 with mild central " canal stenosis    Xray left shoulder 2/5/2018 Humeral head is elevated.  Glenohumeral joint arthritis.    Reports reviewed:    MRI cervical spine 07/31/2018  1. Widening of the atlantodental interal in neutral and flexion positions with a 5mm space which reduces to 1-2 mm in extension  2. 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 Rheumatoid arthritis involving multiple sites with positive rheumatoid factor (HCC) M05.79   2. 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 Shoulder arthritis M19.019   4. Hand deformity, acquired, left M21.942   5. Chronic, continuous use of opioids F11.90   6. Impaired mobility and ADLs Z74.09   7. MCP subluxation, initial encounter S63.219A   8. Cervicalgia M54.2         ASSESSMENT and PLAN:     Mary Izaguirreanor Juan 48 y.o. female with rheumatoid arthritis    Mary was seen today for follow-up.    Diagnoses and all orders for this visit:    Rheumatoid arthritis involving multiple sites with positive rheumatoid factor (HCC)  -     CONSENT FOR OPIATE PRESCRIPTION - on file  -     oxyCODONE-acetaminophen (PERCOCET) 5-325 MG Tab; Take 1 Tab by mouth every 8 hours as needed for Severe Pain for up to 30 days.    Impaired mobility and ADLs    Hand deformity, acquired, left    Amputation right hand, sequela    Chronic, continuous use of opioids    Atlantoaxial instability    Pain in both knees      Plan to continue percocet for chronic pain.  Continue to follow-up with Rheumatology for management of other medications related to rheumatoid arthritis.    Continue with PT.  She has made great progress with meaningful improvements in ADL independence and is happy about the progress that she made with OT.    Reviewing Dr. Valdovinos's note, it is not clear where she was  referred for cervical spine injection.  This is already scheduled, she will continue as planned.      In prescribing controlled substances to this patient, I certify that I have obtained and reviewed the medical history of Mary Martinez. I have also made a good aristides effort to obtain applicable records from other providers who have treated the patient and records did not demonstrate any increased risk of substance abuse that would prevent me from prescribing controlled substances.     I have conducted a physical exam and documented it. I have reviewed Ms. Martinez’s prescription history as maintained by the [place] Prescription Monitoring Program.   ORT 4, indicates moderate risk    I have assessed the patient’s risk for abuse, dependency, and addiction using the validated Opioid Risk Tool available at https://www.mdcalc.com/uatcpl-czrz-nasb-ort-narcotic-abuse.     Given the above, I believe the benefits of controlled substance therapy outweigh the risks. The reasons for prescribing controlled substances include non-narcotic, oral analgesic alternatives have been inadequate for pain control and in my professional opinion, controlled substances are the only reasonable choice for this patient because her pain was note adequately controlled with alternatives and she has chronic progressive disease. Accordingly, I have discussed the risk and benefits, treatment plan, and alternative therapies with the patient.         Follow up: 1 month    Thank you for allowing me to participate in the care of this patient. If you have any questions please not hesitate to contact me.        Please note that this dictation was created using voice recognition software. I have made every reasonable attempt to correct obvious errors but there may be errors of grammar and content that I may have overlooked prior to finalization of this note.      Jayy Kerr MD  Interventional Spine and Sports Physiatry  Physical Medicine and  [facility]

## 2018-09-28 ENCOUNTER — HOSPITAL ENCOUNTER (EMERGENCY)
Dept: HOSPITAL 8 - ED | Age: 48
Discharge: HOME | End: 2018-09-28
Payer: MEDICAID

## 2018-09-28 VITALS — BODY MASS INDEX: 23.44 KG/M2 | WEIGHT: 132.28 LBS | HEIGHT: 63 IN

## 2018-09-28 VITALS — DIASTOLIC BLOOD PRESSURE: 75 MMHG | SYSTOLIC BLOOD PRESSURE: 122 MMHG
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
## 2018-09-28 DIAGNOSIS — F17.200: ICD-10-CM

## 2018-09-28 DIAGNOSIS — S60.212A: Primary | ICD-10-CM
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
## 2018-09-28 PROCEDURE — 99284 EMERGENCY DEPT VISIT MOD MDM: CPT

## 2018-09-28 PROCEDURE — 29125 APPL SHORT ARM SPLINT STATIC: CPT

## 2018-09-28 PROCEDURE — 73110 X-RAY EXAM OF WRIST: CPT

## 2018-10-04 ENCOUNTER — PHYSICAL THERAPY (OUTPATIENT)
Dept: PHYSICAL THERAPY | Facility: REHABILITATION | Age: 48
End: 2018-10-04
Attending: NEUROLOGICAL SURGERY
Payer: MEDICAID

## 2018-10-04 DIAGNOSIS — G89.29 NECK PAIN, CHRONIC: ICD-10-CM

## 2018-10-04 DIAGNOSIS — M54.2 NECK PAIN, CHRONIC: ICD-10-CM

## 2018-10-04 PROCEDURE — 97014 ELECTRIC STIMULATION THERAPY: CPT

## 2018-10-04 PROCEDURE — 97110 THERAPEUTIC EXERCISES: CPT

## 2018-10-04 NOTE — OP THERAPY DAILY TREATMENT
Outpatient Physical Therapy  DAILY TREATMENT     [facility] Physical [address]  [address]  [place] [address]  Phone:  [phone]  Fax:  [phone]    Date: 10/04/2018    Patient: Mary Martinez  YOB: 1970  MRN: [id number]     Time Calculation  Start time: 1330  Stop time: 1415 Time Calculation (min): 45 minutes     Chief Complaint: Neck Pain    Visit #: 2    SUBJECTIVE:  I saw Dr. Santiago and he did injections on Sept. 6, 2018.  They have not helped that much.      OBJECTIVE:  Patient has an appt. With Dr. Valdovinos on 10/31/18. Patient reported she fell on Tuesday when she was getting off the couch and her knees buckled on her. Patient presents to therapy with use of 4WW.          Therapeutic Exercises (CPT 89144):     1. Nustep x 6 minutes L 6    2. Seate flasher, Level 0 band     3. Sit to stand x 10, 2 sets     4. Seated knee extension     Therapeutic Treatments and Modalities:     1. E Stim Unattended (CPT 56930), IFC and heat to c/s spine x 15 min    Time-based treatments/modalities:  Therapeutic exercise minutes (CPT 91447): 30 minutes       Pain rating before treatment: 5  Pain rating after treatment: 4    ASSESSMENT:   Response to treatment: Patient demonstrates generalized weakness.     PLAN/RECOMMENDATIONS:   Plan for treatment: therapy treatment to continue next visit.  Planned interventions for next visit: continue with current treatment.

## 2018-10-09 ENCOUNTER — PHYSICAL THERAPY (OUTPATIENT)
Dept: PHYSICAL THERAPY | Facility: REHABILITATION | Age: 48
End: 2018-10-09
Attending: NEUROLOGICAL SURGERY
Payer: MEDICAID

## 2018-10-09 DIAGNOSIS — G89.29 NECK PAIN, CHRONIC: ICD-10-CM

## 2018-10-09 DIAGNOSIS — M54.2 NECK PAIN, CHRONIC: ICD-10-CM

## 2018-10-09 PROCEDURE — 97014 ELECTRIC STIMULATION THERAPY: CPT

## 2018-10-09 PROCEDURE — 97110 THERAPEUTIC EXERCISES: CPT

## 2018-10-09 NOTE — OP THERAPY DAILY TREATMENT
Outpatient Physical Therapy  DAILY TREATMENT     [facility] Physical [address]  [address]  [place] [address]  Phone:  [phone]  Fax:  [phone]    Date: 10/09/2018    Patient: Mary Martinez  YOB: 1970  MRN: [id number]     Time Calculation  Start time: 1330  Stop time: 1417 Time Calculation (min): 47 minutes     Chief Complaint: Neck Problem    Visit #: 3    SUBJECTIVE:  I am doing okay. I slept through almost the whole night after last treatment session.   OBJECTIVE:    Therapeutic Exercises (CPT 70796):     1. Nustep x 8 minutes L 4    2. Seate flasher, Level 0 band     3. Sit to stand x 10, 2 sets     4. Seated knee extension     5. Bridging 2 x 12    6. Supine hip abduction with resistance    7. Sidelying clams, 20x    Therapeutic Treatments and Modalities:     1. E Stim Unattended (CPT 06305), IFC and heat to c/s spine x 15 min    Time-based treatments/modalities:  Therapeutic exercise minutes (CPT 41221): 26 minutes       Pain rating before treatment: 5  Pain rating after treatment: 3    ASSESSMENT:   Response to treatment: Patient tolerated treatment well. Patient fatigues rapidly, but pushes through with exercises.     PLAN/RECOMMENDATIONS:   Plan for treatment: therapy treatment to continue next visit.  Planned interventions for next visit: continue with current treatment.

## 2018-10-11 ENCOUNTER — PHYSICAL THERAPY (OUTPATIENT)
Dept: PHYSICAL THERAPY | Facility: REHABILITATION | Age: 48
End: 2018-10-11
Attending: NEUROLOGICAL SURGERY
Payer: MEDICAID

## 2018-10-11 DIAGNOSIS — G89.29 NECK PAIN, CHRONIC: ICD-10-CM

## 2018-10-11 DIAGNOSIS — M54.2 NECK PAIN, CHRONIC: ICD-10-CM

## 2018-10-11 PROCEDURE — 97014 ELECTRIC STIMULATION THERAPY: CPT

## 2018-10-11 NOTE — OP THERAPY DAILY TREATMENT
Outpatient Physical Therapy  DAILY TREATMENT     [facility] Physical [address].  [address]  [place] [address]  Phone:  [phone]  Fax:  [phone]    Date: 10/11/2018    Patient: Mary Martinez  YOB: 1970  MRN: [id number]     Time Calculation  Start time: 1330  Stop time: 1415 Time Calculation (min): 45 minutes     Chief Complaint: Neck Pain    Visit #: 4    SUBJECTIVE:  My neck has not been as sore at night.     OBJECTIVE:      Therapeutic Exercises (CPT 26345):     1. Nustep x 10 minutes L 4    2. Stanidng hip abduction , 1  x 15     3. Standing hip extension, 1 x 15     4. Sit to stand without UE support , 2 x 10     5. Bridging 2 x 12    6. Supine ball rolls , 30x    Therapeutic Treatments and Modalities:     1. E Stim Unattended (CPT 87078), IFC and heat to c/s spine x 15 min    Time-based treatments/modalities:  Therapeutic exercise minutes (CPT 63642): 30 minutes       Pain rating before treatment: 3  Pain rating after treatment: 2    ASSESSMENT:   Response to treatment: Patient is demonstrating improved generalized strength and endurance.     PLAN/RECOMMENDATIONS:   Plan for treatment: therapy treatment to continue next visit.  Planned interventions for next visit: continue with current treatment.

## 2018-10-16 ENCOUNTER — PHYSICAL THERAPY (OUTPATIENT)
Dept: PHYSICAL THERAPY | Facility: REHABILITATION | Age: 48
End: 2018-10-16
Attending: NEUROLOGICAL SURGERY
Payer: MEDICAID

## 2018-10-16 DIAGNOSIS — M54.2 NECK PAIN, CHRONIC: ICD-10-CM

## 2018-10-16 DIAGNOSIS — G89.29 NECK PAIN, CHRONIC: ICD-10-CM

## 2018-10-16 PROCEDURE — 97014 ELECTRIC STIMULATION THERAPY: CPT

## 2018-10-16 PROCEDURE — 97110 THERAPEUTIC EXERCISES: CPT

## 2018-10-16 NOTE — OP THERAPY DAILY TREATMENT
Outpatient Physical Therapy  DAILY TREATMENT     [facility] Physical Therapy [address].  [address]  [place] [address]  Phone:  [phone]  Fax:  [phone]    Date: 10/16/2018    Patient: Mary Martinez  YOB: 1970  MRN: [id number]     Time Calculation  Start time: 1330  Stop time: 1415 Time Calculation (min): 45 minutes     Chief Complaint: Neck Problem    Visit #: 5    SUBJECTIVE:  I am doing okay.  Today is about the first time in 3 years I haven't had much pain.      OBJECTIVE:    Therapeutic Exercises (CPT 56768):     1. Nustep x 10 minutes L 4    2. Stanidng hip abduction , 1  x 15     3. Standing hip extension, 1 x 15     4. Sit to stand without UE support , 2 x 10     5. Bridging 2 x 12    6. Supine ball rolls , 30x    7. Sidelying clams , 20x    8. Bent knee fall outs     9. Supine SLR , 2 x 10     11. UPOC due 10/24/18    Therapeutic Treatments and Modalities:     1. E Stim Unattended (CPT 48905), IFC and heat to c/s spine x 15 min    Time-based treatments/modalities:  Therapeutic exercise minutes (CPT 83324): 30 minutes       Pain rating before treatment: 0  Pain rating after treatment: 0    ASSESSMENT:   Response to treatment: Patient has demonstrates significant improvements in overall strength and endurance which has resulted in improved function and decreased pain.     PLAN/RECOMMENDATIONS:   Plan for treatment: therapy treatment to continue next visit.  Planned interventions for next visit: continue with current treatment.

## 2018-10-18 ENCOUNTER — APPOINTMENT (OUTPATIENT)
Dept: PHYSICAL THERAPY | Facility: REHABILITATION | Age: 48
End: 2018-10-18
Attending: NEUROLOGICAL SURGERY
Payer: MEDICAID

## 2018-10-25 ENCOUNTER — PHYSICAL THERAPY (OUTPATIENT)
Dept: PHYSICAL THERAPY | Facility: REHABILITATION | Age: 48
End: 2018-10-25
Attending: NEUROLOGICAL SURGERY
Payer: MEDICAID

## 2018-10-25 DIAGNOSIS — G89.29 NECK PAIN, CHRONIC: ICD-10-CM

## 2018-10-25 DIAGNOSIS — M54.2 NECK PAIN, CHRONIC: ICD-10-CM

## 2018-10-25 PROCEDURE — 97110 THERAPEUTIC EXERCISES: CPT

## 2018-10-25 PROCEDURE — 97014 ELECTRIC STIMULATION THERAPY: CPT

## 2018-10-25 NOTE — OP THERAPY DAILY TREATMENT
Outpatient Physical Therapy  DAILY TREATMENT     [facility] Physical [address]  [address]  [place] [address]  Phone:  [phone]  Fax:  [phone]    Date: 10/25/2018    Patient: Mary Martinez  YOB: 1970  MRN: [id number]     Time Calculation  Start time: 1330  Stop time: 1415 Time Calculation (min): 45 minutes     Chief Complaint: Neck Problem    Visit #: 6    SUBJECTIVE:  I am okay.     OBJECTIVE:  Patient reports she has follow up appt with Dr. Valdovinos on 10/31/18    Therapeutic Exercises (CPT 00269):     1. Nustep x 10 minutes L 4    2. Bridging on swiss ball ,  3 x 8    3. Leg press on shuttle 5 c, 2 legs , 3 x 10     4. Leg press on shuttle, single leg , 2 x 10     5. Bridging 2 x 12    6. Supine ball rolls , 30x    7. Single leg stance , 20x    8. Rhomberg     9. Tandem , 2 x 10     11. UPOC due 10/24/18    Therapeutic Treatments and Modalities:     1. E Stim Unattended (CPT 12030), IFC and heat to c/s spine x 15 min    Time-based treatments/modalities:  Therapeutic exercise minutes (CPT 70261): 26 minutes       Pain rating before treatment: 5  Pain rating after treatment: 4    ASSESSMENT:   Response to treatment: Patient tolerated treatment well. Patient continues to have significant pain, but improved overall strength.      PLAN/RECOMMENDATIONS:   Plan for treatment: therapy treatment to continue next visit.  Planned interventions for next visit: continue with current treatment.

## 2018-10-30 ENCOUNTER — OFFICE VISIT (OUTPATIENT)
Dept: PHYSICAL MEDICINE AND REHAB | Facility: MEDICAL CENTER | Age: 48
End: 2018-10-30
Payer: MEDICAID

## 2018-10-30 VITALS
DIASTOLIC BLOOD PRESSURE: 80 MMHG | SYSTOLIC BLOOD PRESSURE: 110 MMHG | TEMPERATURE: 98 F | OXYGEN SATURATION: 95 % | WEIGHT: 133.38 LBS | HEIGHT: 63 IN | HEART RATE: 81 BPM | BODY MASS INDEX: 23.63 KG/M2

## 2018-10-30 DIAGNOSIS — M05.79 RHEUMATOID ARTHRITIS INVOLVING MULTIPLE SITES WITH POSITIVE RHEUMATOID FACTOR (HCC): ICD-10-CM

## 2018-10-30 DIAGNOSIS — M21.942 HAND DEFORMITY, ACQUIRED, LEFT: ICD-10-CM

## 2018-10-30 DIAGNOSIS — F11.90 CHRONIC, CONTINUOUS USE OF OPIOIDS: ICD-10-CM

## 2018-10-30 DIAGNOSIS — Z78.9 IMPAIRED MOBILITY AND ADLS: ICD-10-CM

## 2018-10-30 DIAGNOSIS — R20.2 PARESTHESIAS: ICD-10-CM

## 2018-10-30 DIAGNOSIS — Z74.09 IMPAIRED MOBILITY AND ADLS: ICD-10-CM

## 2018-10-30 DIAGNOSIS — M53.2X1 ATLANTOAXIAL INSTABILITY: ICD-10-CM

## 2018-10-30 PROCEDURE — 99214 OFFICE O/P EST MOD 30 MIN: CPT | Performed by: PHYSICAL MEDICINE & REHABILITATION

## 2018-10-30 RX ORDER — OXYCODONE HYDROCHLORIDE AND ACETAMINOPHEN 5; 325 MG/1; MG/1
TABLET ORAL
Qty: 120 TAB | Refills: 0 | Status: SHIPPED | OUTPATIENT
Start: 2018-10-30 | End: 2018-11-28 | Stop reason: SDUPTHER

## 2018-10-30 RX ORDER — GABAPENTIN 800 MG/1
800 TABLET ORAL
Qty: 120 TAB | Refills: 0 | Status: SHIPPED | OUTPATIENT
Start: 2018-10-30 | End: 2018-11-22 | Stop reason: SDUPTHER

## 2018-10-30 ASSESSMENT — PAIN SCALES - GENERAL: PAINLEVEL: 6=MODERATE PAIN

## 2018-10-30 NOTE — PROGRESS NOTES
"Follow up patient note  Pain Medicine, Interventional spine and sports physiatry, Physical medicine rehabilitation      Chief complaint:   Joint pain      HISTORY    Please see new patient note dated 06/08/2018 by Dr Kerr,  for more details.     HPI  Patient identification: Mary Martinez 48 y.o. female returns for follow-up of her pain related to rheumatoid arthritis.      Interval history: She reports that she has been seen by Dr. Valdovinos and he has referred her elsewhere for a cervical epidural.  She states that this did not particularly help with her pain.  She is finishing up with physical therapy and is transitioning to her home program.  The therapist has told her that she is stronger than she was.    The right arm continues to have some symptoms with note of worsening numbness and tingling that is \"driving her crazy.\"    Pain medications help to maintain her function, she reports no side effects.  Bowel movements continue to be regular without use of OTC medications.    OT has been completed, she reports that she can still pull off her own shirt, drink from a cup, and tie her own shoes.  Some days she can put on her bra, some days not, but this is still an improvement.  Getting in and out of the tub independently.      Previous review of records at a previous visit: Records from [facility] treatment with Dr. Rosen outlining course and ultimately amputations of the right hand in 2017     ROS  Unchanged from previous visit  Red Flags :   Fever, Chills, Sweats: Denies  Involuntary Weight Loss: Denies  Bowel/Bladder Incontinence: Denies      PMHx:   Past Medical History:   Diagnosis Date   • ASTHMA     inhalers prn   • Dental disorder     upper partial   • Pain     everywhere   • Psychiatric problem     anxiety   • Rheumatoid arthritis(714.0) 2003       PSHx:   Past Surgical History:   Procedure Laterality Date   • FINGER ARTHROPLASTY Right 6/5/2017    Procedure: FINGER ARTHROPLASTY - LONG, RING AND SMALL " VOLAR PLATE;  Surgeon: Lobo Rosen M.D.;  Location: SURGERY SAME DAY [facility];  Service:    • FINGER AMPUTATION  6/5/2017    Procedure: FINGER AMPUTATION - LONG, RING AND SMALL AT THE PROXIMAL INTERPHALANGEAL JOINT;  Surgeon: Lobo Rosen M.D.;  Location: SURGERY SAME DAY [facility];  Service:    • FINGER ARTHROPLASTY Right 4/17/2017    Procedure: FINGER ARTHROPLASTY ;  Surgeon: Lobo Rosen M.D.;  Location: SURGERY SAME DAY [facility];  Service:    • FINGER AMPUTATION Right 9/14/2016    Procedure: FINGER AMPUTATION INDEX;  Surgeon: Lobo Rosen M.D.;  Location: SURGERY SAME DAY [facility];  Service:    • IRRIGATION & DEBRIDEMENT ORTHO Right 9/11/2016    Procedure: IRRIGATION & DEBRIDEMENT ORTHO - right index finger;  Surgeon: Madhu Rosen M.D.;  Location: [facility];  Service:    • PIP ARTHRODESIS Right 8/29/2016    Procedure: RE-DO INDEX FINGER PROXIMAL INTERPHALANGEAL ARTHRODESIS;  Surgeon: Lobo Rosen M.D.;  Location: SURGERY SAME DAY [facility];  Service:    • BONE GRAFT Right 8/29/2016    Procedure: BONE GRAFT - DISTAL RADIUS ;  Surgeon: Lobo Rosen M.D.;  Location: SURGERY SAME DAY [facility];  Service:    • ARTHRODESIS Right 5/6/2016    Procedure: ARTHRODESIS INDEX FINGER PROXIMAL INTERPHALANGEAL;  Surgeon: Lobo Rosen M.D.;  Location: SURGERY SAME DAY [facility];  Service:    • FOOT RECONSTRUCTION RHEUMATIC Left 7/29/2015    Procedure: FOOT RECONSTRUCTION RHEUMATIC;  Surgeon: Heriberto Alves M.D.;  Location: [facility];  Service:    • TOE FUSION Right 5/27/2015    Procedure: TOE FUSION 1ST METATARSALPHALANGEAL JOINT;  Surgeon: Heriberto Alves M.D.;  Location: SURGERY [facility];  Service:    • BONE SPUR EXCISION  5/27/2015    Procedure: BONE SPUR EXCISION METATARSAL HEAD 2-3;  Surgeon: Heriberto Alves M.D.;  Location: [facility];  Service:    • HAMMERTOE CORRECTION  5/27/2015     Procedure: HAMMERTOE CORRECTION AND SOFT TISSUE RE-ALINGMENT 2-3 ;  Surgeon: Heriberto Alves M.D.;  Location: SURGERY [facility];  Service:    • EMANUEL BY LAPAROSCOPY  2011    Performed by VERO WRIGHT at SURGERY [facility]   • ABDOMINAL ABSCESS DRAINAGE  2011    Performed by VERO WRIGHT at SURGERY [facility]   • OTHER  1982    tonsils   • APPENDECTOMY     • CHOLECYSTECTOMY     • GYN SURGERY      C section X 4   • OTHER ABDOMINAL SURGERY      small colon block   • PB  DELIVERY ONLY      x 4   • TONSILLECTOMY     • WRIST ORIF         Family history   History reviewed. No pertinent family history.      Medications:   Current Outpatient Prescriptions   Medication   • oxyCODONE-acetaminophen (PERCOCET) 5-325 MG Tab   • gabapentin (NEURONTIN) 800 MG tablet   • PROTONIX 40 MG Recon Soln   • paroxetine (PAXIL) 40 MG tablet   • Etanercept (ENBREL) 50 MG/ML Solution Prefilled Syringe   • paroxetine (PAXIL) 20 MG Tab   • busPIRone (BUSPAR) 15 MG tablet   • albuterol (VENTOLIN OR PROVENTIL) 108 (90 BASE) MCG/ACT AERS inhalation aerosol     No current facility-administered medications for this visit.        Allergies:   Allergies   Allergen Reactions   • Nitrous Oxide Vomiting   • Penicillins Hives     Tolerates cephalosporins       Social Hx:   Social History     Social History   • Marital status:      Spouse name: N/A   • Number of children: N/A   • Years of education: N/A     Occupational History   • Not on file.     Social History Main Topics   • Smoking status: Current Every Day Smoker     Packs/day: 1.00     Years: 30.00     Types: Cigarettes   • Smokeless tobacco: Never Used      Comment: 1 ppd   • Alcohol use No   • Drug use: No   • Sexual activity: Not on file     Other Topics Concern   •  Service No   • Blood Transfusions Yes   • Caffeine Concern No   • Occupational Exposure No   • Hobby Hazards No   • Sleep Concern No   • Stress Concern Yes   • Weight  "Concern No   • Special Diet No   • Back Care No   • Exercise Yes   • Bike Helmet Yes   • Seat Belt Yes   • Self-Exams Yes     Social History Narrative    ** Merged History Encounter **              EXAMINATION     Physical Exam:   Vitals: Blood pressure 110/80, pulse 81, temperature 36.7 °C (98 °F), temperature source Temporal, height 1.6 m (5' 3\"), weight 60.5 kg (133 lb 6.1 oz), last menstrual period 09/21/2011, SpO2 95 %, not currently breastfeeding.    Constitutional:   Body Habitus: Body mass index is 23.63 kg/m².  Cooperation: Fully cooperates with exam  Appearance: Well-groomed no disheveled, in no acute distress  Respiratory-  breathing comfortable on room air, no audible wheezing  Cardiovascular-  No lower extremity edema is noted.   Psychiatric- alert and oriented ×3. Normal affect.   Musculoskeletal:  Decreased ROM of the shoulders, left greater than right. Crepitus and \"clunking\" of the left shoulder with active forward flexion.  MMT: 3/5 shoulder abduction on the left and 4/5 on the right, elbow flexion is 5-/5 on the right and 4/5 on the left, elbow extension is 5/5 bilaterally, wrist extension 4/5 bilaterally. Limited ability to determine MMT distally.   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  Unchanged.      Mild tenderness over the left wrist, no warmth or erythema    MEDICAL DECISION MAKING    DATA    Labs: No new labs for review, requesting labs from [facility]       Imaging:  No new imaging  MRI cervical spine 07/31/2018:  Films reviewed.  These are my reads:  There is is note of motion at the atlantodental level with 5mm atlantodental inverval in flexion that reduces to 1-2 mm in extension.  Mild retrolisthesis of C4 on C5 and anterolisthesis of C5- on C6.  Disc extrusion at C6-7 with mild " central canal stenosis    Xray left shoulder 2/5/2018 Humeral head is elevated.  Glenohumeral joint arthritis.    Reports reviewed:    MRI cervical spine 07/31/2018  1. Widening of the atlantodental interal in neutral and flexion positions with a 5mm space which reduces to 1-2 mm in extension  2. 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 Rheumatoid arthritis involving multiple sites with positive rheumatoid factor (HCC) M05.79   2. 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 Impaired mobility and ADLs Z74.09   4. Hand deformity, acquired, left M21.942   5. Chronic, continuous use of opioids F11.90   6. Paresthesias R20.2         ASSESSMENT and PLAN:     Mary Estes Juan 48 y.o. female with rheumatoid arthritis    Mary was seen today for follow-up.    Diagnoses and all orders for this visit:    Rheumatoid arthritis involving multiple sites with positive rheumatoid factor (HCC)  -     CONSENT FOR OPIATE PRESCRIPTION - on file  -     oxyCODONE-acetaminophen (PERCOCET) 5-325 MG Tab; Take 1 Tab by mouth every 8 hours as needed for Severe Pain for up to 30 days.  -     [employer] UDS ordered today    Impaired mobility and ADLs    Hand deformity, acquired, left    Amputation right hand, sequela    Chronic, continuous use of opioids    Atlantoaxial instability    Pain in both knees    Paresthesias          -   Gabapentin 800mg po QID      Plan to continue percocet for chronic pain.  Continue to follow-up with Rheumatology for management of other medications related to rheumatoid arthritis.    Mary will follow-up with Dr. Valdovinos tomorrow.  We discussed that he has not recommended surgery to her at this time, she is concerned about the loss of range of motion that would occur after with the  required surgery.      Trial increasing her gabapentin to 800mg po QID to see if this helps control her symptoms better.  Will consider ordering EMG depending on how she does and what she and Dr. Valdovinos decide to do.    Continue with home exercise program from PT.  She has made great progress with meaningful improvements in ADL independence and is happy about the progress that she made with OT.    In prescribing controlled substances to this patient, I certify that I have obtained and reviewed the medical history of Mary Martinez. I have also made a good aristides effort to obtain applicable records from other providers who have treated the patient and records did not demonstrate any increased risk of substance abuse that would prevent me from prescribing controlled substances.     I have conducted a physical exam and documented it. I have reviewed Ms. Martinez’s prescription history as maintained by the [place] Prescription Monitoring Program.   ORT 4, indicates moderate risk    I have assessed the patient’s risk for abuse, dependency, and addiction using the validated Opioid Risk Tool available at https://www.mdcalc.com/egpnzd-cepr-vted-ort-narcotic-abuse.     Given the above, I believe the benefits of controlled substance therapy outweigh the risks. The reasons for prescribing controlled substances include non-narcotic, oral analgesic alternatives have been inadequate for pain control and in my professional opinion, controlled substances are the only reasonable choice for this patient because her pain was note adequately controlled with alternatives and she has chronic progressive disease. Accordingly, I have discussed the risk and benefits, treatment plan, and alternative therapies with the patient.         Follow up: 1 month    Thank you for allowing me to participate in the care of this patient. If you have any questions please not hesitate to contact me.        Please note that this dictation was created  using voice recognition software. I have made every reasonable attempt to correct obvious errors but there may be errors of grammar and content that I may have overlooked prior to finalization of this note.      Jayy Kerr MD  Interventional Spine and Sports Physiatry  Physical Medicine and Rehabilitation  [facility]

## 2018-11-01 ENCOUNTER — PHYSICAL THERAPY (OUTPATIENT)
Dept: PHYSICAL THERAPY | Facility: REHABILITATION | Age: 48
End: 2018-11-01
Attending: NEUROLOGICAL SURGERY
Payer: MEDICAID

## 2018-11-01 DIAGNOSIS — G89.29 NECK PAIN, CHRONIC: ICD-10-CM

## 2018-11-01 DIAGNOSIS — M54.2 NECK PAIN, CHRONIC: ICD-10-CM

## 2018-11-01 PROCEDURE — 97014 ELECTRIC STIMULATION THERAPY: CPT

## 2018-11-01 PROCEDURE — 97110 THERAPEUTIC EXERCISES: CPT

## 2018-11-01 NOTE — OP THERAPY DAILY TREATMENT
Outpatient Physical Therapy  DAILY TREATMENT     [facility] Physical [address]  [address]  [place] [address]  Phone:  [phone]  Fax:  [phone]    Date: 11/01/2018    Patient: Mary Martinez  YOB: 1970  MRN: [id number]     Time Calculation  Start time: 0955  Stop time: 1045 Time Calculation (min): 50 minutes     Chief Complaint: Neck Problem    Visit #: 7    SUBJECTIVE:  I saw Dr. Valdovinos and he recommended another MRI and an EMG.      OBJECTIVE:      Therapeutic Exercises (CPT 08072):     1. Nustep x 10 minutes L3    2. Reviewed current HEP     3. Patient to consider purchasing a home TENS unit for pain management    Therapeutic Treatments and Modalities:     1. E Stim Unattended (CPT 54317), IFC andheat to c/s spine x 15 min    Time-based treatments/modalities:  Therapeutic exercise minutes (CPT 82387): 28 minutes Long Term Goals:    1. Patient will score <50% impairment on the Quick Dash.  GNM  2. Patient will be independent in upgraded HEP. GM  3. Patient will report decreased reports of headaches by 10%.  GNM       Pain rating before treatment: 3  Pain rating after treatment: 3    ASSESSMENT:   Response to treatment: Patient has made generalized overall improvements in strength and endurance.     PLAN/RECOMMENDATIONS:   Plan for treatment: D/C from PT. Goals partially met

## 2018-11-22 DIAGNOSIS — R20.2 PARESTHESIAS: ICD-10-CM

## 2018-11-26 RX ORDER — GABAPENTIN 800 MG/1
TABLET ORAL
Qty: 120 TAB | Refills: 3 | Status: SHIPPED | OUTPATIENT
Start: 2018-11-26 | End: 2019-03-09 | Stop reason: SDUPTHER

## 2018-11-28 ENCOUNTER — APPOINTMENT (OUTPATIENT)
Dept: PHYSICAL MEDICINE AND REHAB | Facility: MEDICAL CENTER | Age: 48
End: 2018-11-28

## 2018-11-28 ENCOUNTER — OFFICE VISIT (OUTPATIENT)
Dept: PHYSICAL MEDICINE AND REHAB | Facility: MEDICAL CENTER | Age: 48
End: 2018-11-28
Payer: MEDICAID

## 2018-11-28 VITALS
DIASTOLIC BLOOD PRESSURE: 80 MMHG | TEMPERATURE: 97.5 F | HEIGHT: 63 IN | HEART RATE: 100 BPM | OXYGEN SATURATION: 95 % | BODY MASS INDEX: 23.71 KG/M2 | WEIGHT: 133.82 LBS | SYSTOLIC BLOOD PRESSURE: 120 MMHG

## 2018-11-28 DIAGNOSIS — R20.2 PARESTHESIAS: ICD-10-CM

## 2018-11-28 DIAGNOSIS — M54.2 CERVICALGIA: ICD-10-CM

## 2018-11-28 DIAGNOSIS — M05.79 RHEUMATOID ARTHRITIS INVOLVING MULTIPLE SITES WITH POSITIVE RHEUMATOID FACTOR (HCC): ICD-10-CM

## 2018-11-28 DIAGNOSIS — S68.411S AMPUTATION OF RIGHT HAND, SEQUELA (HCC): ICD-10-CM

## 2018-11-28 DIAGNOSIS — M53.2X1 ATLANTOAXIAL INSTABILITY: ICD-10-CM

## 2018-11-28 DIAGNOSIS — F11.90 CHRONIC, CONTINUOUS USE OF OPIOIDS: ICD-10-CM

## 2018-11-28 DIAGNOSIS — M21.942 HAND DEFORMITY, ACQUIRED, LEFT: ICD-10-CM

## 2018-11-28 PROCEDURE — 99214 OFFICE O/P EST MOD 30 MIN: CPT | Performed by: PHYSICAL MEDICINE & REHABILITATION

## 2018-11-28 RX ORDER — OXYCODONE HYDROCHLORIDE AND ACETAMINOPHEN 5; 325 MG/1; MG/1
1 TABLET ORAL EVERY 6 HOURS PRN
Qty: 120 TAB | Refills: 0 | Status: SHIPPED | OUTPATIENT
Start: 2018-12-29 | End: 2019-01-18 | Stop reason: SDUPTHER

## 2018-11-28 RX ORDER — OXYCODONE HYDROCHLORIDE AND ACETAMINOPHEN 5; 325 MG/1; MG/1
TABLET ORAL
Qty: 120 TAB | Refills: 0 | Status: SHIPPED | OUTPATIENT
Start: 2018-11-29 | End: 2018-12-28

## 2018-11-28 ASSESSMENT — PAIN SCALES - GENERAL: PAINLEVEL: 6=MODERATE PAIN

## 2018-11-28 NOTE — PROGRESS NOTES
Follow up patient note  Pain Medicine, Interventional spine and sports physiatry, Physical medicine rehabilitation      Chief complaint:   Joint pain      HISTORY    Please see new patient note dated 06/08/2018 by Dr Kerr,  for more details.     HPI  Patient identification: Mary Martinez 48 y.o. female returns for follow-up of her pain related to rheumatoid arthritis.      Interval history: She reports that she will see Dr. Valdovinos again about her cervical spine.  Epidural steroid injection did not help with her symptoms.  She hopes that she can avoid surgery as she is not anxious to have that done.  Recently, she had more imaging studies at [facility], but I cannot review these currently.    The right arm continues to have numbness and tingling.    Pain medications help to maintain her function, she reports no side effects.    Bowel movements continue to be regular without use of OTC medications.    OT has been completed, she reports that she can still pull off her own shirt, drink from a cup, and tie her own shoes.  Some days she can put on her bra, some days not, but this is still an improvement.  Getting in and out of the tub independently.  She is maintaining these improvements and is pleased to be more independent with her self-care.       ROS  Unchanged from previous visit  Red Flags :   Fever, Chills, Sweats: Denies  Involuntary Weight Loss: Denies  Bowel/Bladder Incontinence: Denies      PMHx:   Past Medical History:   Diagnosis Date   • ASTHMA     inhalers prn   • Dental disorder     upper partial   • Pain     everywhere   • Psychiatric problem     anxiety   • Rheumatoid arthritis(714.0) 2003       PSHx:   Past Surgical History:   Procedure Laterality Date   • FINGER ARTHROPLASTY Right 6/5/2017    Procedure: FINGER ARTHROPLASTY - LONG, RING AND SMALL VOLAR PLATE;  Surgeon: Lobo Rosen M.D.;  Location: SURGERY SAME DAY [facility];  Service:    • FINGER AMPUTATION  6/5/2017    Procedure: FINGER  AMPUTATION - LONG, RING AND SMALL AT THE PROXIMAL INTERPHALANGEAL JOINT;  Surgeon: Lobo Rosen M.D.;  Location: SURGERY SAME DAY [facility];  Service:    • FINGER ARTHROPLASTY Right 4/17/2017    Procedure: FINGER ARTHROPLASTY ;  Surgeon: Lobo Rosen M.D.;  Location: SURGERY SAME DAY [facility];  Service:    • FINGER AMPUTATION Right 9/14/2016    Procedure: FINGER AMPUTATION INDEX;  Surgeon: Lobo Rosen M.D.;  Location: SURGERY SAME DAY [facility];  Service:    • IRRIGATION & DEBRIDEMENT ORTHO Right 9/11/2016    Procedure: IRRIGATION & DEBRIDEMENT ORTHO - right index finger;  Surgeon: Madhu Rosen M.D.;  Location: SURGERY [facility];  Service:    • PIP ARTHRODESIS Right 8/29/2016    Procedure: RE-DO INDEX FINGER PROXIMAL INTERPHALANGEAL ARTHRODESIS;  Surgeon: Lobo Rosen M.D.;  Location: SURGERY SAME DAY [facility];  Service:    • BONE GRAFT Right 8/29/2016    Procedure: BONE GRAFT - DISTAL RADIUS ;  Surgeon: Lobo Rosen M.D.;  Location: SURGERY SAME DAY [facility];  Service:    • ARTHRODESIS Right 5/6/2016    Procedure: ARTHRODESIS INDEX FINGER PROXIMAL INTERPHALANGEAL;  Surgeon: Lobo Rosen M.D.;  Location: SURGERY SAME DAY [facility];  Service:    • FOOT RECONSTRUCTION RHEUMATIC Left 7/29/2015    Procedure: FOOT RECONSTRUCTION RHEUMATIC;  Surgeon: Heriberto Alves M.D.;  Location: [facility];  Service:    • TOE FUSION Right 5/27/2015    Procedure: TOE FUSION 1ST METATARSALPHALANGEAL JOINT;  Surgeon: Heriberto Alves M.D.;  Location: [facility];  Service:    • BONE SPUR EXCISION  5/27/2015    Procedure: BONE SPUR EXCISION METATARSAL HEAD 2-3;  Surgeon: Heriberto Alves M.D.;  Location: [facility];  Service:    • HAMMERTOE CORRECTION  5/27/2015    Procedure: HAMMERTOE CORRECTION AND SOFT TISSUE RE-ALINGMENT 2-3 ;  Surgeon: Heriberto Alves M.D.;  Location: [facility];  Service:    •  EMANUEL BY LAPAROSCOPY  2011    Performed by VERO WRIGHT at SURGERY [facility] ORS   • ABDOMINAL ABSCESS DRAINAGE  2011    Performed by VERO WRIGHT at SURGERY [facility] ORS   • OTHER  1982    tonsils   • APPENDECTOMY     • CHOLECYSTECTOMY     • GYN SURGERY      C section X 4   • OTHER ABDOMINAL SURGERY      small colon block   • PB  DELIVERY ONLY      x 4   • TONSILLECTOMY     • WRIST ORIF         Family history   History reviewed. No pertinent family history.      Medications:   Current Outpatient Prescriptions   Medication   • oxyCODONE-acetaminophen (PERCOCET) 5-325 MG Tab   • [START ON 2018] oxyCODONE-acetaminophen (PERCOCET) 5-325 MG Tab   • gabapentin (NEURONTIN) 800 MG tablet   • PROTONIX 40 MG Recon Soln   • paroxetine (PAXIL) 40 MG tablet   • Etanercept (ENBREL) 50 MG/ML Solution Prefilled Syringe   • paroxetine (PAXIL) 20 MG Tab   • busPIRone (BUSPAR) 15 MG tablet   • albuterol (VENTOLIN OR PROVENTIL) 108 (90 BASE) MCG/ACT AERS inhalation aerosol     No current facility-administered medications for this visit.        Allergies:   Allergies   Allergen Reactions   • Nitrous Oxide Vomiting   • Penicillins Hives     Tolerates cephalosporins       Social Hx:   Social History     Social History   • Marital status:      Spouse name: N/A   • Number of children: N/A   • Years of education: N/A     Occupational History   • Not on file.     Social History Main Topics   • Smoking status: Current Every Day Smoker     Packs/day: 1.00     Years: 30.00     Types: Cigarettes   • Smokeless tobacco: Never Used      Comment: 1 ppd   • Alcohol use No   • Drug use: No   • Sexual activity: Not on file     Other Topics Concern   •  Service No   • Blood Transfusions Yes   • Caffeine Concern No   • Occupational Exposure No   • Hobby Hazards No   • Sleep Concern No   • Stress Concern Yes   • Weight Concern No   • Special Diet No   • Back Care No   • Exercise Yes   • Bike Helmet Yes  "  • Seat Belt Yes   • Self-Exams Yes     Social History Narrative    ** Merged History Encounter **              EXAMINATION     Physical Exam:   Vitals: Blood pressure 120/80, pulse 100, temperature 36.4 °C (97.5 °F), temperature source Temporal, height 1.6 m (5' 3\"), weight 60.7 kg (133 lb 13.1 oz), last menstrual period 09/21/2011, SpO2 95 %, not currently breastfeeding.    Constitutional:   Body Habitus: Body mass index is 23.71 kg/m².  Cooperation: Fully cooperates with exam  Appearance: Well-groomed no disheveled, in no acute distress  Respiratory-  breathing comfortable on room air, no audible wheezing  Cardiovascular-  No lower extremity edema is noted.   Psychiatric- alert and oriented ×3. Normal affect.   Musculoskeletal:  Decreased ROM of the shoulders, left greater than right. Crepitus and \"clunking\" of the left shoulder with active forward flexion.  MMT: 3/5 shoulder abduction on the left and 4/5 on the right, elbow flexion is 5-/5 on the right and 4/5 on the left, elbow extension is 5/5 bilaterally, wrist extension 4/5 bilaterally.  3+/5 on the left.  Limited ability to determine MMT distally due to amputations on the right.    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  Unchanged.      Mild tenderness over the left wrist, no warmth or erythema    MEDICAL DECISION MAKING    DATA    Labs: UDS 10/30/2018 consistent with medications.  Oxycodone and metabolites without other substances        Imaging:  No new imaging  MRI cervical spine 07/31/2018:  Films reviewed.  These are my reads:  There is is note of motion at the atlantodental level with 5mm atlantodental inverval in flexion that reduces to 1-2 mm in extension.  Mild retrolisthesis of C4 on C5 and anterolisthesis of C5- on C6.  Disc " extrusion at C6-7 with mild central canal stenosis    Xray left shoulder 2/5/2018 Humeral head is elevated.  Glenohumeral joint arthritis.    Reports reviewed:  Xray cervical spine 11/14/2018  1. No acute fracture  2. Persistent motion at the C1-2 joint as before, suggesting atlantoaxial instability  3. Minimal instability noted at C5-6 level as described.    MRI cervical spine 07/31/2018  1. Widening of the atlantodental interal in neutral and flexion positions with a 5mm space which reduces to 1-2 mm in extension  2. 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 Rheumatoid arthritis involving multiple sites with positive rheumatoid factor ([facility]) M05.79   2. 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 Amputation of right hand, sequela ([facility]) S68.411S   4. Chronic, continuous use of opioids F11.90   5. Hand deformity, acquired, left M21.942   6. Cervicalgia M54.2   7. Paresthesias R20.2         ASSESSMENT and PLAN:     Mary Meera Martinez 48 y.o. female with rheumatoid arthritis    Mary was seen today for follow-up.    Diagnoses and all orders for this visit:    Rheumatoid arthritis involving multiple sites with positive rheumatoid factor ([facility])  -     CONSENT FOR OPIATE PRESCRIPTION - on file  -     oxyCODONE-acetaminophen (PERCOCET) 5-325 MG Tab; Take 1 Tab by mouth every 8 hours as needed for Severe Pain for up to 30 days.    Hand deformity, acquired, left    Amputation right hand, sequela    Chronic, continuous use of opioids    Atlantoaxial instability    Paresthesias          -   Gabapentin 800mg po QID      Plan to continue percocet for chronic pain.  Continue to follow-up with Rheumatology for management of other medications related to rheumatoid arthritis.    Mary will follow-up  with Dr. Valdovinos.  We discussed that he has not recommended surgery to her at this time, she is concerned about the loss of range of motion that would occur after with the required surgery.      Trial increasing her gabapentin to 800mg po QID to see if this helps control her symptoms better.  EMG bilateral upper extremities.    Continue with home exercise program from PT.  She has made great progress with meaningful improvements in ADL independence and is happy about the progress that she made with OT.    In prescribing controlled substances to this patient, I certify that I have obtained and reviewed the medical history of Mary Martinez. I have also made a good aristides effort to obtain applicable records from other providers who have treated the patient and records did not demonstrate any increased risk of substance abuse that would prevent me from prescribing controlled substances.     I have conducted a physical exam and documented it. I have reviewed Ms. Martinez’s prescription history as maintained by the [place] Prescription Monitoring Program.   ORT 4, indicates moderate risk    I have assessed the patient’s risk for abuse, dependency, and addiction using the validated Opioid Risk Tool available at https://www.mdcalc.com/tcwwvm-jpwx-ojtj-ort-narcotic-abuse.     Given the above, I believe the benefits of controlled substance therapy outweigh the risks. The reasons for prescribing controlled substances include non-narcotic, oral analgesic alternatives have been inadequate for pain control and in my professional opinion, controlled substances are the only reasonable choice for this patient because her pain was note adequately controlled with alternatives and she has chronic progressive disease. Accordingly, I have discussed the risk and benefits, treatment plan, and alternative therapies with the patient.         Follow up: 1 month    Thank you for allowing me to participate in the care of this patient. If  you have any questions please not hesitate to contact me.        Please note that this dictation was created using voice recognition software. I have made every reasonable attempt to correct obvious errors but there may be errors of grammar and content that I may have overlooked prior to finalization of this note.      Jayy Kerr MD  Interventional Spine and Sports Physiatry  Physical Medicine and Rehabilitation  [facility]

## 2018-12-07 ENCOUNTER — TELEPHONE (OUTPATIENT)
Dept: PHYSICAL MEDICINE AND REHAB | Facility: MEDICAL CENTER | Age: 48
End: 2018-12-07

## 2018-12-10 ENCOUNTER — HOSPITAL ENCOUNTER (OUTPATIENT)
Dept: RADIOLOGY | Facility: MEDICAL CENTER | Age: 48
End: 2018-12-10

## 2018-12-12 ENCOUNTER — HOSPITAL ENCOUNTER (OUTPATIENT)
Dept: RADIOLOGY | Facility: MEDICAL CENTER | Age: 48
End: 2018-12-12

## 2018-12-21 ENCOUNTER — OFFICE VISIT (OUTPATIENT)
Dept: PHYSICAL MEDICINE AND REHAB | Facility: MEDICAL CENTER | Age: 48
End: 2018-12-21
Payer: MEDICAID

## 2018-12-21 VITALS
TEMPERATURE: 97.1 F | BODY MASS INDEX: 22.62 KG/M2 | HEIGHT: 63 IN | HEART RATE: 91 BPM | SYSTOLIC BLOOD PRESSURE: 108 MMHG | DIASTOLIC BLOOD PRESSURE: 78 MMHG | OXYGEN SATURATION: 96 % | WEIGHT: 127.65 LBS

## 2018-12-21 DIAGNOSIS — M54.12 CERVICAL RADICULOPATHY: ICD-10-CM

## 2018-12-21 DIAGNOSIS — R20.2 PARESTHESIAS: ICD-10-CM

## 2018-12-21 DIAGNOSIS — R42 VERTIGO: ICD-10-CM

## 2018-12-21 DIAGNOSIS — M53.2X1 ATLANTOAXIAL INSTABILITY: ICD-10-CM

## 2018-12-21 PROCEDURE — 95886 MUSC TEST DONE W/N TEST COMP: CPT | Performed by: PHYSICAL MEDICINE & REHABILITATION

## 2018-12-21 PROCEDURE — 95911 NRV CNDJ TEST 9-10 STUDIES: CPT | Performed by: PHYSICAL MEDICINE & REHABILITATION

## 2018-12-21 PROCEDURE — 99214 OFFICE O/P EST MOD 30 MIN: CPT | Mod: 25 | Performed by: PHYSICAL MEDICINE & REHABILITATION

## 2018-12-21 RX ORDER — MECLIZINE HCL 12.5 MG/1
12.5 TABLET ORAL 3 TIMES DAILY PRN
Qty: 60 TAB | Refills: 1 | Status: SHIPPED | OUTPATIENT
Start: 2018-12-21 | End: 2019-01-05

## 2018-12-21 ASSESSMENT — PAIN SCALES - GENERAL: PAINLEVEL: 6=MODERATE PAIN

## 2018-12-21 NOTE — PROGRESS NOTES
"[facility]  Sports and Spine, Physiatry, EMG  [facility]  [address]  [place] [address]    Test Date:  2018    Patient: Mary Martinez : 1970 Physician: Jayy Kerr M.D.    Sex: Female Height: 5' 3\" Ref Phys: Jayy Kerr MD   MRN#: [id number] Weight: 57.9 kg Technician: N/A     Chief complaint:  Pain, numbness and tingling in the upper extremities bilaterally    Mary presents for electrodiagnostic evaluation of bilateral upper extremities.  She has neck pain and has started having more pain in the neck and upper extremities.  There is aching pain in the hands and she has noted weakness.  Gabapentin and oxycodone do help to manage symptoms.    Recently, she has developed vertigo and notes that this worsens when she moves her head.       PMH/PSH/Meds/SH are unchanged from previous visit.    Past medical history is negative for diabetes mellitus, thryoid disease, cervical or lumbar spine surgery, or known history of cancer.    FH is negative for known history of neuromuscular disease.    Exam: Unchanged from 2018  Appearance: Well-groomed no disheveled, in no acute distress  Respiratory-  breathing comfortable on room air, no audible wheezing  Cardiovascular-  No lower extremity edema is noted.   Psychiatric- alert and oriented ×3. Normal affect.   Musculoskeletal:  Decreased ROM of the shoulders, left greater than right. Crepitus and \"clunking\" of the left shoulder with active forward flexion.  MMT: 3/5 shoulder abduction on the left and 4/5 on the right, elbow flexion is 5-/5 on the right and 4/5 on the left, elbow extension is 5/5 bilaterally, wrist extension 4/5 bilaterally.  3+/5 on the left.  Limited ability to determine MMT distally due to amputations on the right.  Reflexes are 2+ biceps, triceps bilaterally, 2+ patella and achilles bilaterally     Partial hand amputation on the right at the MCPs; ulnar deviation on the left with MCP subluxation, " mild atrophy of the hand intrinsics with wasting of the thenar eminence.  Unchanged.           NCV & EMG Findings:  All nerves (as in the following tables) were within normal limits.  All left vs. right side differences were within normal limits.      Needle evaluation of the right abductor pollicis brevis muscle showed diminished recruitment and moderately decreased interference pattern.  The right extensor digitorum muscle showed slightly increased spontaneous activity and CRD Other.  The right pronator teres, the left first dorsal interosseous, and the left flexor carpi ulnaris muscles showed slightly increased spontaneous activity.  The left extensor digitorum muscle showed slightly increased polyphasic potentials, diminished recruitment, and CRD Other.  The left mid cervical paraspinal, the left low cervical paraspinal, and the right low cervical paraspinal muscles showed CRD Other.  All remaining muscles (as indicated in the following table) showed no evidence of electrical instability.      Impression/Recommendations:  Abnormal study  1. Today's electrodiagnostic findings are consistent with:      A. Acute on chronic left cervical radiculopathy primarily involving C7-8       B. Acute on chronic right cervical radiculopathy primarily involving C7-8  2. Today's electrodiagnostic findings point against:      A. Left or right median neuropathy      B. Left or right ulnar neuropathy  3. Clinically, her symptoms are consistent with these findings.  She will follow-up with Dr. Valdovinos as scheduled.  4. We also discussed that she has been having positional vertigo.  We discussed a trial of meclizine for symptoms.  Will consider vestibular rehabilitation, but I would like to have her meet with Dr. Valdovinos first.      ___________________________  Jayy Kerr MD  Physical Medicine and Rehabilitation  Interventional Spine and Sports Physiatry  [facility]          Nerve Conduction Studies  Motor Summary  Table     Stim Site NR Onset (ms) Norm Onset (ms) O-P Amp (mV) Norm O-P Amp Site1 Site2 Delta-0 (ms) Dist (cm) Roger (m/s) Norm Roger (m/s)   Left Median Motor (Abd Poll Brev)   Wrist    3.8 <4.2 6.5 >5 Elbow Wrist 3.0 16.5 55 >50   Elbow    6.8  5.9          Ulnar to APB    3.1  5.4          Right Median Motor (Abd Poll Brev)    Temp over the right carpal tunnel 33.1C   Wrist    3.3 <4.2 5.4 >5 Elbow Wrist 3.3 18.5 56 >50   Elbow    6.6  5.3          Ulnar to APB    3.6  2.4          Left Ulnar Motor (Abd Dig Min)   Wrist    3.1 <4.2 8.7 >3 B Elbow Wrist 2.3 13.5 59 >53   B Elbow    5.4  8.3  A Elbow B Elbow 1.5 10.0 67 >53   A Elbow    6.9  8.1          Right Ulnar Motor (Abd Dig Min)   Wrist    2.7 <4.2 9.6 >3 B Elbow Wrist 2.8 16.5 59 >53   B Elbow    5.5  9.4  A Elbow B Elbow 1.5 11.5 77 >53   A Elbow    7.0  9.1            Comparison Summary Table     Stim Site NR Peak (ms) Norm Peak (ms) P-T Amp (µV) Site1 Site2 Delta-P (ms) Norm Delta (ms)   Left Median/Ulnar Dig IV Comparison (Digit 4 - 14cm)   Median Wr    3.6 <4.1 54.4 Median Wr Ulnar Wr 0.0 <0.4   Ulnar Wr    3.6 <4.1 51.6       Left Median/Ulnar Palm Comparison (Wrist - 8cm)   Median Palm    2.1 <2.5 22.4 Median Palm Ulnar Palm 0.3 <0.3   Ulnar Palm    2.4 <2.5 23.9       Right Median/Ulnar Palm Comparison (Wrist - 8cm)   Median Palm    1.9 <2.5 41.6 Median Palm Ulnar Palm 0.1 <0.3   Ulnar Palm    1.8 <2.5 39.8         EMG+     Side Muscle Nerve Root Ins Act Fibs Psw Amp Dur Poly Recrt Int Pat Other Comment   Right Abd Poll Brev Median C8-T1 Nml Nml Nml Nml Nml 0 Reduced 75% None    Right 1stDorInt Ulnar C8-T1 Nml Nml Nml Nml Nml 0 Nml Nml None    Right FlexCarpiUln Ulnar C8,T1 Nml Nml Nml Nml Nml 0 Nml Nml None    Right Ext Digitorum Radial (Post Int) C7-8 Nml 1+ 1+ Nml Nml 0 Nml Nml CRD    Right Biceps Musculocut C5-6 Nml Nml Nml Nml Nml 0 Nml Nml None    Right Triceps Radial C6-7-8 Nml Nml Nml Nml Nml 0 Nml Nml None    Right Deltoid Axillary C5-6 Nml Nml  Nml Nml Nml 0 Nml Nml None    Right PronatorTeres Median C6-7 Nml 1+ 1+ Nml Nml 0 Nml Nml None    Left Abd Poll Brev Median C8-T1 Nml Nml Nml Nml Nml 0 Nml Nml None    Left 1stDorInt Ulnar C8-T1 Nml 1+ 1+ Nml Nml 0 Nml Nml None    Left PronatorTeres Median C6-7 Nml Nml Nml Nml Nml 0 Nml Nml None    Left FlexCarpiUln Ulnar C8,T1 Nml 1+ 1+ Nml Nml 0 Nml Nml None    Left Ext Digitorum Radial (Post Int) C7-8 Nml Nml Nml Nml Nml 1+ Reduced Nml CRD    Left Biceps Musculocut C5-6 Nml Nml Nml Nml Nml 0 Nml Nml None    Left Triceps Radial C6-7-8 Nml Nml Nml Nml Nml 0 Nml Nml None    Left Deltoid Axillary C5-6 Nml Nml Nml Nml Nml 0 Nml Nml None    Left Cervical Parasp Up Rami C1-3 Nml Nml Nml      None    Left Cervical Parasp Mid Rami C4-6 Nml Nml Nml      CRD    Left Cervical Parasp Low Rami C7-8 Nml Nml Nml      CRD    Right Cervical Parasp Up Rami C1-3 Nml Nml Nml      None    Right Cervical Parasp Mid Rami C4-6 Nml Nml Nml      None    Right Cervical Parasp Low Rami C7-8 Nml Nml Nml      CRD    Left Abd Dig Min Ulnar C8-T1 Nml Nml Nml Nml Nml 0 Nml Nml None    Left FlexCarRad Median C6-7 Nml Nml Nml Nml Nml 0 Nml Nml None            Waveforms:

## 2019-01-11 ENCOUNTER — HOSPITAL ENCOUNTER (OUTPATIENT)
Dept: RADIOLOGY | Facility: MEDICAL CENTER | Age: 49
End: 2019-01-11

## 2019-01-18 ENCOUNTER — OFFICE VISIT (OUTPATIENT)
Dept: PHYSICAL MEDICINE AND REHAB | Facility: MEDICAL CENTER | Age: 49
End: 2019-01-18
Payer: MEDICAID

## 2019-01-18 VITALS
DIASTOLIC BLOOD PRESSURE: 78 MMHG | HEART RATE: 94 BPM | OXYGEN SATURATION: 95 % | SYSTOLIC BLOOD PRESSURE: 100 MMHG | TEMPERATURE: 97.4 F | BODY MASS INDEX: 22.77 KG/M2 | HEIGHT: 63 IN | WEIGHT: 128.53 LBS

## 2019-01-18 DIAGNOSIS — Z78.9 IMPAIRED MOBILITY AND ADLS: ICD-10-CM

## 2019-01-18 DIAGNOSIS — M05.79 RHEUMATOID ARTHRITIS INVOLVING MULTIPLE SITES WITH POSITIVE RHEUMATOID FACTOR (HCC): ICD-10-CM

## 2019-01-18 DIAGNOSIS — M54.12 CERVICAL RADICULOPATHY: ICD-10-CM

## 2019-01-18 DIAGNOSIS — S68.411S AMPUTATION OF RIGHT HAND, SEQUELA (HCC): ICD-10-CM

## 2019-01-18 DIAGNOSIS — Z74.09 IMPAIRED MOBILITY AND ADLS: ICD-10-CM

## 2019-01-18 DIAGNOSIS — R20.2 PARESTHESIAS: ICD-10-CM

## 2019-01-18 PROCEDURE — 99214 OFFICE O/P EST MOD 30 MIN: CPT | Performed by: PHYSICAL MEDICINE & REHABILITATION

## 2019-01-18 RX ORDER — ALENDRONATE SODIUM 70 MG/1
70 TABLET ORAL
COMMUNITY
Start: 2019-01-15 | End: 2019-10-22

## 2019-01-18 RX ORDER — ETANERCEPT 50 MG/ML
SOLUTION SUBCUTANEOUS
COMMUNITY
Start: 2019-01-16 | End: 2019-01-18

## 2019-01-18 RX ORDER — OXYCODONE HYDROCHLORIDE AND ACETAMINOPHEN 5; 325 MG/1; MG/1
1 TABLET ORAL EVERY 6 HOURS PRN
Qty: 120 TAB | Refills: 0 | Status: SHIPPED | OUTPATIENT
Start: 2019-01-28 | End: 2019-02-20 | Stop reason: SDUPTHER

## 2019-01-18 RX ORDER — PANTOPRAZOLE SODIUM 40 MG/1
40 TABLET, DELAYED RELEASE ORAL DAILY
Status: ON HOLD | COMMUNITY
Start: 2019-01-13 | End: 2019-10-14

## 2019-01-18 ASSESSMENT — PATIENT HEALTH QUESTIONNAIRE - PHQ9: CLINICAL INTERPRETATION OF PHQ2 SCORE: 0

## 2019-01-18 ASSESSMENT — PAIN SCALES - GENERAL: PAINLEVEL: 4=SLIGHT-MODERATE PAIN

## 2019-01-18 NOTE — PROGRESS NOTES
Follow up patient note  Pain Medicine, Interventional spine and sports physiatry, Physical medicine rehabilitation      Chief complaint:   Joint pain      HISTORY    Please see new patient note dated 06/08/2018 by Dr Kerr,  for more details.     HPI  Patient identification: Mary Martinez 48 y.o. female returns for follow-up of her pain related to rheumatoid arthritis.      Interval history:  Mary reports that she has burning pain in her upper extremities for the last three months.  It sounds like stretching and exercises do help with her pain, somewhat.  Gabapentin seems to help a little bit as well with the increase.  Still, she has more symptoms radiating into her arms.    Symptoms are otherwise stable.  Tingling and numbness is greater in her right arm more than her left.  She has not seen Dr. Valdovinos again yet.  She did not have significant improvement with epidural steroid injection.    Pain medications help to maintain her function, she reports no side effects.     Bowel movements continue to be regular without use of OTC medications.    She continues to have gains after OT.  She reports that she can still pull off her own shirt, drink from a cup, and tie her own shoes.  Some days she can put on her bra, some days not, but this is still an improvement.  Getting in and out of the tub independently.  She is maintaining these improvements and is pleased to be more independent with her self-care.       ROS  Unchanged from previous visit  Red Flags :   Fever, Chills, Sweats: Denies  Involuntary Weight Loss: Denies  Bowel/Bladder Incontinence: Denies      PMHx:   Past Medical History:   Diagnosis Date   • ASTHMA     inhalers prn   • Dental disorder     upper partial   • Pain     everywhere   • Psychiatric problem     anxiety   • Rheumatoid arthritis(714.0) 2003       PSHx:   Past Surgical History:   Procedure Laterality Date   • FINGER ARTHROPLASTY Right 6/5/2017    Procedure: FINGER ARTHROPLASTY - LONG,  RING AND SMALL VOLAR PLATE;  Surgeon: Lobo Rosen M.D.;  Location: SURGERY SAME DAY [facility];  Service:    • FINGER AMPUTATION  6/5/2017    Procedure: FINGER AMPUTATION - LONG, RING AND SMALL AT THE PROXIMAL INTERPHALANGEAL JOINT;  Surgeon: Lobo Rosen M.D.;  Location: SURGERY SAME DAY [facility];  Service:    • FINGER ARTHROPLASTY Right 4/17/2017    Procedure: FINGER ARTHROPLASTY ;  Surgeon: Lobo Rosen M.D.;  Location: SURGERY SAME DAY [facility];  Service:    • FINGER AMPUTATION Right 9/14/2016    Procedure: FINGER AMPUTATION INDEX;  Surgeon: Lobo Rosen M.D.;  Location: SURGERY SAME DAY [facility];  Service:    • IRRIGATION & DEBRIDEMENT ORTHO Right 9/11/2016    Procedure: IRRIGATION & DEBRIDEMENT ORTHO - right index finger;  Surgeon: Madhu Rosen M.D.;  Location: [facility];  Service:    • PIP ARTHRODESIS Right 8/29/2016    Procedure: RE-DO INDEX FINGER PROXIMAL INTERPHALANGEAL ARTHRODESIS;  Surgeon: Lobo Rosen M.D.;  Location: SURGERY SAME DAY [facility];  Service:    • BONE GRAFT Right 8/29/2016    Procedure: BONE GRAFT - DISTAL RADIUS ;  Surgeon: Lobo Rosen M.D.;  Location: SURGERY SAME DAY [facility];  Service:    • ARTHRODESIS Right 5/6/2016    Procedure: ARTHRODESIS INDEX FINGER PROXIMAL INTERPHALANGEAL;  Surgeon: Lobo Rosen M.D.;  Location: SURGERY SAME DAY [facility];  Service:    • FOOT RECONSTRUCTION RHEUMATIC Left 7/29/2015    Procedure: FOOT RECONSTRUCTION RHEUMATIC;  Surgeon: Heriberto Alves M.D.;  Location: [facility];  Service:    • TOE FUSION Right 5/27/2015    Procedure: TOE FUSION 1ST METATARSALPHALANGEAL JOINT;  Surgeon: Heriberto Alves M.D.;  Location: SURGERY [facility];  Service:    • BONE SPUR EXCISION  5/27/2015    Procedure: BONE SPUR EXCISION METATARSAL HEAD 2-3;  Surgeon: Heriberto Alves M.D.;  Location: [facility];  Service:    • HAMMERTOE  CORRECTION  2015    Procedure: HAMMERTOE CORRECTION AND SOFT TISSUE RE-ALINGMENT 2-3 ;  Surgeon: Heriberto Alves M.D.;  Location: SURGERY [facility];  Service:    • EMANUEL BY LAPAROSCOPY  2011    Performed by VERO WRIGHT at SURGERY [facility]   • ABDOMINAL ABSCESS DRAINAGE  2011    Performed by VERO WRIGHT at SURGERY [facility]   • OTHER  1982    tonsils   • APPENDECTOMY     • CHOLECYSTECTOMY     • GYN SURGERY      C section X 4   • OTHER ABDOMINAL SURGERY      small colon block   • PB  DELIVERY ONLY      x 4   • TONSILLECTOMY     • WRIST ORIF         Family history   History reviewed. No pertinent family history.      Medications:   Current Outpatient Prescriptions   Medication   • alendronate (FOSAMAX) 70 MG Tab   • [START ON 2019] oxyCODONE-acetaminophen (PERCOCET) 5-325 MG Tab   • gabapentin (NEURONTIN) 800 MG tablet   • paroxetine (PAXIL) 40 MG tablet   • Etanercept (ENBREL) 50 MG/ML Solution Prefilled Syringe   • paroxetine (PAXIL) 20 MG Tab   • pantoprazole (PROTONIX) 40 MG Tablet Delayed Response   • busPIRone (BUSPAR) 15 MG tablet   • albuterol (VENTOLIN OR PROVENTIL) 108 (90 BASE) MCG/ACT AERS inhalation aerosol     No current facility-administered medications for this visit.        Allergies:   Allergies   Allergen Reactions   • Nitrous Oxide Vomiting   • Penicillins Hives     Tolerates cephalosporins       Social Hx:   Social History     Social History   • Marital status:      Spouse name: N/A   • Number of children: N/A   • Years of education: N/A     Occupational History   • Not on file.     Social History Main Topics   • Smoking status: Current Every Day Smoker     Packs/day: 1.00     Years: 30.00     Types: Cigarettes   • Smokeless tobacco: Never Used      Comment: 1 ppd   • Alcohol use No   • Drug use: No   • Sexual activity: Not on file     Other Topics Concern   •  Service No   • Blood Transfusions Yes   • Caffeine Concern No   •  "Occupational Exposure No   • Hobby Hazards No   • Sleep Concern No   • Stress Concern Yes   • Weight Concern No   • Special Diet No   • Back Care No   • Exercise Yes   • Bike Helmet Yes   • Seat Belt Yes   • Self-Exams Yes     Social History Narrative    ** Merged History Encounter **              EXAMINATION     Physical Exam:   Vitals: Blood pressure 100/78, pulse 94, temperature 36.3 °C (97.4 °F), temperature source Temporal, height 1.6 m (5' 3\"), weight 58.3 kg (128 lb 8.5 oz), last menstrual period 09/21/2011, SpO2 95 %, not currently breastfeeding.    Constitutional:   Body Habitus: Body mass index is 22.77 kg/m².  Cooperation: Fully cooperates with exam  Appearance: Well-groomed no disheveled, in no acute distress  Respiratory-  breathing comfortable on room air, no audible wheezing  Cardiovascular-  No lower extremity edema is noted.   Psychiatric- alert and oriented ×3. Normal affect.   Musculoskeletal:  Decreased ROM of the shoulders, left greater than right. Crepitus and \"clunking\" of the left shoulder with active forward flexion.  MMT: 3/5 shoulder abduction on the left and 4/5 on the right, elbow flexion is 5-/5 on the right and 4/5 on the left, elbow extension is 5/5 bilaterally, wrist extension 4/5 bilaterally.  3+/5 on the left.  Limited ability to determine MMT distally due to amputations on the right.    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  Unchanged.      Mild tenderness over the left wrist, no warmth or erythema    MEDICAL DECISION MAKING    DATA    Labs: UDS 10/30/2018 consistent with medications.  Oxycodone and metabolites without other substances        Imaging:  No new imaging  MRI cervical spine 07/31/2018:  Films reviewed.  These are my reads:  There is is note of motion at " the atlantodental level with 5mm atlantodental inverval in flexion that reduces to 1-2 mm in extension.  Mild retrolisthesis of C4 on C5 and anterolisthesis of C5- on C6.  Disc extrusion at C6-7 with mild central canal stenosis    Xray left shoulder 2/5/2018 Humeral head is elevated.  Glenohumeral joint arthritis.    Reports reviewed:  Xray cervical spine 11/14/2018  1. No acute fracture  2. Persistent motion at the C1-2 joint as before, suggesting atlantoaxial instability  3. Minimal instability noted at C5-6 level as described.    MRI cervical spine 07/31/2018  1. Widening of the atlantodental interal in neutral and flexion positions with a 5mm space which reduces to 1-2 mm in extension  2. 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 Cervical radiculopathy M54.12   2. Paresthesias R20.2   3. Rheumatoid arthritis involving multiple sites with positive rheumatoid factor ([facility]) M05.79   4. Amputation of right hand, sequela ([facility]) S68.411S   5. Impaired mobility and ADLs Z74.09         ASSESSMENT and PLAN:     Mary Estes Juan 48 y.o. female with rheumatoid arthritis    Mary was seen today for follow-up.    Diagnoses and all orders for this visit:    Rheumatoid arthritis involving multiple sites with positive rheumatoid factor ([facility])  -     CONSENT FOR OPIATE PRESCRIPTION - on file  -     oxyCODONE-acetaminophen (PERCOCET) 5-325 MG Tab; Take 1 Tab by mouth every 8 hours as needed for Severe Pain for up to 30 days.    Hand deformity, acquired, left    Amputation right hand, sequela    Chronic, continuous use of opioids    Atlantoaxial instability    Paresthesias          -   Gabapentin 800mg po QID      Plan to continue percocet for chronic pain.  Continue to follow-up with  Rheumatology for management of other medications related to rheumatoid arthritis.    Mary will follow-up with Dr. Valdovinos.  Will make sure that Dr. Valdovinos gets the EMG.    Continue gabapentin to 800mg po QID to see if this helps control her symptoms better.      In prescribing controlled substances to this patient, I certify that I have obtained and reviewed the medical history of Mary Martinez. I have also made a good aristides effort to obtain applicable records from other providers who have treated the patient and records did not demonstrate any increased risk of substance abuse that would prevent me from prescribing controlled substances.     I have conducted a physical exam and documented it. I have reviewed Ms. Martinez’s prescription history as maintained by the [place] Prescription Monitoring Program.   ORT 4, indicates moderate risk    I have assessed the patient’s risk for abuse, dependency, and addiction using the validated Opioid Risk Tool available at https://www.mdcalc.com/fbfcws-qmyk-qrha-ort-narcotic-abuse.     Given the above, I believe the benefits of controlled substance therapy outweigh the risks. The reasons for prescribing controlled substances include non-narcotic, oral analgesic alternatives have been inadequate for pain control and in my professional opinion, controlled substances are the only reasonable choice for this patient because her pain was note adequately controlled with alternatives and she has chronic progressive disease. Accordingly, I have discussed the risk and benefits, treatment plan, and alternative therapies with the patient.         Follow up: 1 month    Thank you for allowing me to participate in the care of this patient. If you have any questions please not hesitate to contact me.        Please note that this dictation was created using voice recognition software. I have made every reasonable attempt to correct obvious errors but there may be errors of grammar and  content that I may have overlooked prior to finalization of this note.      Jayy Kerr MD  Interventional Spine and Sports Physiatry  Physical Medicine and Rehabilitation  [facility]

## 2019-01-21 ENCOUNTER — TELEPHONE (OUTPATIENT)
Dept: PHYSICAL MEDICINE AND REHAB | Facility: MEDICAL CENTER | Age: 49
End: 2019-01-21

## 2019-01-21 NOTE — TELEPHONE ENCOUNTER
Patient called and stated that she was not going to start taking Chantix until Dr. Christopher speaks with Dr. Kerr about current medications. Dr. Christopher told the patient that there might be some medications that could interact with the Chantix.

## 2019-01-22 NOTE — TELEPHONE ENCOUNTER
Spoke with patient and informed her that Dr. Shrestha office who is prescribing that Paxil, and Dr. Dela Cruz's office who is prescribing the Chantix, need to communicate. To make sure that both those offices understand what each other is prescribing and how to continue with the treatment. I let her know that the medications that are being prescribed in our office are not going to have interactions with the Chantix.

## 2019-01-22 NOTE — TELEPHONE ENCOUNTER
Who is writing her paxil?  Dr. Christopher needs to communicate with that physician.  Can we facilitate this?      Thanks,  Jayy Kerr MD

## 2019-01-22 NOTE — TELEPHONE ENCOUNTER
Okay, I am missing something here.  I am not writing either the paxil or the Chantix.  However, did tell her there is a potential for drug interaction.  Who is writing the Chantix?  Let's make sure she follow's up with Dr. Christopher's office.

## 2019-02-15 ENCOUNTER — APPOINTMENT (OUTPATIENT)
Dept: PHYSICAL MEDICINE AND REHAB | Facility: MEDICAL CENTER | Age: 49
End: 2019-02-15

## 2019-02-20 ENCOUNTER — OFFICE VISIT (OUTPATIENT)
Dept: PHYSICAL MEDICINE AND REHAB | Facility: MEDICAL CENTER | Age: 49
End: 2019-02-20
Payer: MEDICAID

## 2019-02-20 VITALS
HEIGHT: 63 IN | WEIGHT: 133.38 LBS | HEART RATE: 94 BPM | OXYGEN SATURATION: 98 % | BODY MASS INDEX: 23.63 KG/M2 | TEMPERATURE: 98.6 F | SYSTOLIC BLOOD PRESSURE: 110 MMHG | DIASTOLIC BLOOD PRESSURE: 78 MMHG

## 2019-02-20 DIAGNOSIS — R20.2 PARESTHESIAS: ICD-10-CM

## 2019-02-20 DIAGNOSIS — M54.12 CERVICAL RADICULOPATHY: ICD-10-CM

## 2019-02-20 DIAGNOSIS — M21.942 HAND DEFORMITY, ACQUIRED, LEFT: ICD-10-CM

## 2019-02-20 DIAGNOSIS — S68.411S AMPUTATION OF RIGHT HAND, SEQUELA (HCC): ICD-10-CM

## 2019-02-20 DIAGNOSIS — M53.2X1 ATLANTOAXIAL INSTABILITY: ICD-10-CM

## 2019-02-20 DIAGNOSIS — F17.200 TOBACCO DEPENDENCE: ICD-10-CM

## 2019-02-20 DIAGNOSIS — M05.79 RHEUMATOID ARTHRITIS INVOLVING MULTIPLE SITES WITH POSITIVE RHEUMATOID FACTOR (HCC): ICD-10-CM

## 2019-02-20 PROCEDURE — 1034F CURRENT TOBACCO SMOKER: CPT | Performed by: PHYSICAL MEDICINE & REHABILITATION

## 2019-02-20 PROCEDURE — 99214 OFFICE O/P EST MOD 30 MIN: CPT | Mod: 25 | Performed by: PHYSICAL MEDICINE & REHABILITATION

## 2019-02-20 RX ORDER — OXYCODONE HYDROCHLORIDE AND ACETAMINOPHEN 5; 325 MG/1; MG/1
1 TABLET ORAL EVERY 6 HOURS PRN
Qty: 120 TAB | Refills: 0 | Status: SHIPPED | OUTPATIENT
Start: 2019-02-27 | End: 2019-03-29

## 2019-02-20 RX ORDER — OXYCODONE HYDROCHLORIDE AND ACETAMINOPHEN 5; 325 MG/1; MG/1
1 TABLET ORAL EVERY 6 HOURS PRN
Qty: 120 TAB | Refills: 0 | Status: SHIPPED | OUTPATIENT
Start: 2019-03-29 | End: 2019-04-19 | Stop reason: SDUPTHER

## 2019-02-20 ASSESSMENT — PATIENT HEALTH QUESTIONNAIRE - PHQ9
SUM OF ALL RESPONSES TO PHQ QUESTIONS 1-9: 14
CLINICAL INTERPRETATION OF PHQ2 SCORE: 2
5. POOR APPETITE OR OVEREATING: 3 - NEARLY EVERY DAY

## 2019-02-20 ASSESSMENT — PAIN SCALES - GENERAL: PAINLEVEL: 8=MODERATE-SEVERE PAIN

## 2019-02-21 NOTE — PROGRESS NOTES
Follow up patient note  Pain Medicine, Interventional spine and sports physiatry, Physical medicine rehabilitation      Chief complaint:   Joint pain      HISTORY    Please see new patient note dated 06/08/2018 by Dr Kerr,  for more details.     HPI  Patient identification: Mary Martinez 48 y.o. female returns for follow-up of her pain related to rheumatoid arthritis.      Interval history:    Mary reports that she has been getting more headaches lately.  These headaches are a few times a week.  Also, she has been having some right shoulder pain.  It is not typical for her to have headaches.  Tingling in the right hand has been less and she has not been having symptoms in her right arm.  She has not been back to see Dr. Valdovinos since the last visit.    Yesterday was a bad day, she has had car trouble.  This was very stressful and she thinks that this contributed to her headache yesterday.  Otherwise, she does not note any particular cause of the symptoms.    Mary reports that she has burning pain in her upper extremities seems to be resolving.  She continues to take gabapentin 800mg po qid.  No side effects.  Overall, she does have some neck pain and headaches, but the significant, distressing pain has decreased.  Percocet 5/325 q6h helps keep her symptoms under control.  No difficulty with regular bowel movements.  She is not taking OTC medications.    Occasionally at night, she feels some cracking in her neck.  It is not painful and does not have any other associated symptoms.    Pain medications help to maintain her function, she reports no side effects.     Discussed smoking cessation.  She was given chantix, but had mood changes (anger) and did not tolerate this.  Encouraged her to keep working on this goal.    By history, she has made gains after OT.  She reports that she can still pull off her own shirt, drink from a cup, and tie her own shoes.  Some days she can put on her bra, some days not, but  this is still an improvement.  Getting in and out of the tub independently.  She is maintaining these improvements and is pleased to be more independent with her self-care.    ROS  Unchanged from previous visit 1/18/2019 except as noted in the HPI  Red Flags :   Fever, Chills, Sweats: Denies  Involuntary Weight Loss: Denies  Bowel/Bladder Incontinence: Denies      PMHx:   Past Medical History:   Diagnosis Date   • ASTHMA     inhalers prn   • Dental disorder     upper partial   • Pain     everywhere   • Psychiatric problem     anxiety   • Rheumatoid arthritis(714.0) 2003       PSHx:   Past Surgical History:   Procedure Laterality Date   • FINGER ARTHROPLASTY Right 6/5/2017    Procedure: FINGER ARTHROPLASTY - LONG, RING AND SMALL VOLAR PLATE;  Surgeon: Lobo Rosen M.D.;  Location: SURGERY SAME DAY [facility];  Service:    • FINGER AMPUTATION  6/5/2017    Procedure: FINGER AMPUTATION - LONG, RING AND SMALL AT THE PROXIMAL INTERPHALANGEAL JOINT;  Surgeon: Lobo Rosen M.D.;  Location: SURGERY SAME DAY [facility];  Service:    • FINGER ARTHROPLASTY Right 4/17/2017    Procedure: FINGER ARTHROPLASTY ;  Surgeon: Lobo Rosen M.D.;  Location: SURGERY SAME DAY [facility];  Service:    • FINGER AMPUTATION Right 9/14/2016    Procedure: FINGER AMPUTATION INDEX;  Surgeon: Lobo Rosen M.D.;  Location: SURGERY SAME DAY [facility];  Service:    • IRRIGATION & DEBRIDEMENT ORTHO Right 9/11/2016    Procedure: IRRIGATION & DEBRIDEMENT ORTHO - right index finger;  Surgeon: Madhu Rosen M.D.;  Location: [facility];  Service:    • PIP ARTHRODESIS Right 8/29/2016    Procedure: RE-DO INDEX FINGER PROXIMAL INTERPHALANGEAL ARTHRODESIS;  Surgeon: Lobo Rosen M.D.;  Location: SURGERY SAME DAY [facility];  Service:    • BONE GRAFT Right 8/29/2016    Procedure: BONE GRAFT - DISTAL RADIUS ;  Surgeon: Lobo Rosen M.D.;  Location: SURGERY SAME DAY [facility];   Service:    • ARTHRODESIS Right 2016    Procedure: ARTHRODESIS INDEX FINGER PROXIMAL INTERPHALANGEAL;  Surgeon: Lobo Rosen M.D.;  Location: SURGERY SAME DAY [facility];  Service:    • FOOT RECONSTRUCTION RHEUMATIC Left 2015    Procedure: FOOT RECONSTRUCTION RHEUMATIC;  Surgeon: Heriberto Alves M.D.;  Location: SURGERY [facility];  Service:    • TOE FUSION Right 2015    Procedure: TOE FUSION 1ST METATARSALPHALANGEAL JOINT;  Surgeon: Heriberto Alves M.D.;  Location: SURGERY [facility];  Service:    • BONE SPUR EXCISION  2015    Procedure: BONE SPUR EXCISION METATARSAL HEAD 2-3;  Surgeon: Heriberto Alves M.D.;  Location: SURGERY [facility];  Service:    • HAMMERTOE CORRECTION  2015    Procedure: HAMMERTOE CORRECTION AND SOFT TISSUE RE-ALINGMENT 2-3 ;  Surgeon: Heriberto Alves M.D.;  Location: SURGERY [facility];  Service:    • EMANUEL BY LAPAROSCOPY  2011    Performed by VERO WRIGHT at [facility]   • ABDOMINAL ABSCESS DRAINAGE  2011    Performed by VERO WRIGHT at [facility]   • OTHER  1982    tonsils   • APPENDECTOMY     • CHOLECYSTECTOMY     • GYN SURGERY      C section X 4   • OTHER ABDOMINAL SURGERY      small colon block   • PB  DELIVERY ONLY      x 4   • TONSILLECTOMY     • WRIST ORIF         Family history   History reviewed. No pertinent family history.      Medications:   Outpatient Prescriptions Marked as Taking for the 19 encounter (Office Visit) with Jayy Kerr M.D.   Medication Sig Dispense Refill   • [START ON 2019] oxyCODONE-acetaminophen (PERCOCET) 5-325 MG Tab Take 1 Tab by mouth every 6 hours as needed for Severe Pain for up to 30 days. 120 Tab 0   • [START ON 3/29/2019] oxyCODONE-acetaminophen (PERCOCET) 5-325 MG Tab Take 1 Tab by mouth every 6 hours as needed for Severe Pain for up to 30 days. 120 Tab 0   • alendronate (FOSAMAX) 70 MG Tab      • pantoprazole (PROTONIX) 40 MG  "Tablet Delayed Response      • gabapentin (NEURONTIN) 800 MG tablet TAKE 1 TABLET BY MOUTH FOUR TIMES DAILY 120 Tab 3   • paroxetine (PAXIL) 40 MG tablet      • Etanercept (ENBREL) 50 MG/ML Solution Prefilled Syringe Inject  as instructed every 7 days.     • albuterol (VENTOLIN OR PROVENTIL) 108 (90 BASE) MCG/ACT AERS inhalation aerosol Inhale 2 Puffs by mouth as needed for Shortness of Breath.         Allergies:   Allergies   Allergen Reactions   • Nitrous Oxide Vomiting   • Penicillins Hives     Tolerates cephalosporins       Social Hx:   Social History     Social History   • Marital status:      Spouse name: N/A   • Number of children: N/A   • Years of education: N/A     Occupational History   • Not on file.     Social History Main Topics   • Smoking status: Current Every Day Smoker     Packs/day: 1.00     Years: 30.00     Types: Cigarettes   • Smokeless tobacco: Never Used      Comment: 1 ppd   • Alcohol use No   • Drug use: No   • Sexual activity: Not on file     Other Topics Concern   •  Service No   • Blood Transfusions Yes   • Caffeine Concern No   • Occupational Exposure No   • Hobby Hazards No   • Sleep Concern No   • Stress Concern Yes   • Weight Concern No   • Special Diet No   • Back Care No   • Exercise Yes   • Bike Helmet Yes   • Seat Belt Yes   • Self-Exams Yes     Social History Narrative    ** Merged History Encounter **              EXAMINATION     Physical Exam:   Vitals: Blood pressure 110/78, pulse 94, temperature 37 °C (98.6 °F), temperature source Temporal, height 1.6 m (5' 3\"), weight 60.5 kg (133 lb 6.1 oz), last menstrual period 09/21/2011, SpO2 98 %, not currently breastfeeding.    Constitutional:   Body Habitus: Body mass index is 23.63 kg/m².  Cooperation: Fully cooperates with exam  Appearance: Well-groomed no disheveled, in no acute distress  Respiratory-  breathing comfortable on room air, no audible wheezing  Cardiovascular-  No lower extremity edema is noted. " "  Psychiatric- alert and oriented ×3. Normal affect.   Musculoskeletal:  Cervical spine  Decreased ROM of the shoulders, left greater than right. Crepitus and \"clunking\" of the left shoulder with active forward flexion.  MMT: 4/5 shoulder abduction on the left and 4/5 on the right, elbow flexion is 5-/5 on the right and 4+/5 on the left, elbow extension is 5/5 bilaterally, wrist extension 4/5 bilaterally.  3+/5 on the left.  Limited ability to determine MMT distally due to amputations on the right.     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  Unchanged.      Mild tenderness over the left wrist, no warmth or erythema    MEDICAL DECISION MAKING    DATA    Labs: UDS 10/30/2018 consistent with medications.  Oxycodone and metabolites without other substances        Imaging:  No new imaging  MRI cervical spine 07/31/2018:  Films reviewed.  These are my reads:  There is is note of motion at the atlantodental level with 5mm atlantodental inverval in flexion that reduces to 1-2 mm in extension.  Mild retrolisthesis of C4 on C5 and anterolisthesis of C5- on C6.  Disc extrusion at C6-7 with mild central canal stenosis    Xray left shoulder 2/5/2018 Humeral head is elevated.  Glenohumeral joint arthritis.    Reports reviewed:  Xray cervical spine 11/14/2018  1. No acute fracture  2. Persistent motion at the C1-2 joint as before, suggesting atlantoaxial instability  3. Minimal instability noted at C5-6 level as described.    MRI cervical spine 07/31/2018  1. Widening of the atlantodental interal in neutral and flexion positions with a 5mm space which reduces to 1-2 mm in extension  2. Degenerative changes with the disc extrusion at C6-7 level causing mild canal stenosis    Xray cervical spine 07/06/2018: " Atlantoaxial instability at C1-2     Xrays knees 07/06/2018  Left: Unremarkable  Right: Unremarkable             DIAGNOSIS   Visit Diagnoses     ICD-10-CM   1. Cervical radiculopathy M54.12   2. 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 Rheumatoid arthritis involving multiple sites with positive rheumatoid factor (HCC) M05.79   4. Hand deformity, acquired, left M21.942   5. Amputation of right hand, sequela ([facility]) S68.411S   6. Paresthesias R20.2         ASSESSMENT and PLAN:     Mary Martinez 48 y.o. female with rheumatoid arthritis    Mary was seen today for follow-up.    Diagnoses and all orders for this visit:    Rheumatoid arthritis involving multiple sites with positive rheumatoid factor ([facility])  -     CONSENT FOR OPIATE PRESCRIPTION - on file  -     oxyCODONE-acetaminophen (PERCOCET) 5-325 MG Tab; Take 1 Tab by mouth every 8 hours as needed for Severe Pain for up to 30 days.    Hand deformity, acquired, left    Amputation right hand, sequela    Chronic, continuous use of opioids    Atlantoaxial instability    Paresthesias          -      Continue gabapentin 800mg po QID. This was refilled today.    We reviewed her xrays and MRI and atlantoaxial instability.  She is hesitant about surgery.  Still, I have encouraged her to follow-up with Dr. Valdovinos to discuss surgical options.    Symptoms that were consistent with cervical radiculopathies noted on EMG seem to be improving.      Plan to continue percocet for chronic pain.  Continue to follow-up with Rheumatology for management of other medications related to rheumatoid arthritis.    Refilled percocet.    We discussed cervical facet blocks that might help with her symptoms.  She will consider this.    Encouraged her to continue with efforts to quit smoking.    In prescribing controlled substances to this patient, I certify that I have obtained and reviewed the medical history of Mary Martinez. I have also made a good aristides effort to obtain applicable  records from other providers who have treated the patient and records did not demonstrate any increased risk of substance abuse that would prevent me from prescribing controlled substances.     I have conducted a physical exam and documented it. I have reviewed Ms. Martinez’s prescription history as maintained by the [place] Prescription Monitoring Program.   ORT 4, indicates moderate risk    I have assessed the patient’s risk for abuse, dependency, and addiction using the validated Opioid Risk Tool available at https://www.mdcalc.com/egvwrv-mohj-zdgv-ort-narcotic-abuse.     Given the above, I believe the benefits of controlled substance therapy outweigh the risks. The reasons for prescribing controlled substances include non-narcotic, oral analgesic alternatives have been inadequate for pain control and in my professional opinion, controlled substances are the only reasonable choice for this patient because her pain was note adequately controlled with alternatives and she has chronic progressive disease. Accordingly, I have discussed the risk and benefits, treatment plan, and alternative therapies with the patient.         Follow up: 2 months      Please note that this dictation was created using voice recognition software. I have made every reasonable attempt to correct obvious errors but there may be errors of grammar and content that I may have overlooked prior to finalization of this note.      Jayy Kerr MD  Interventional Spine and Sports Physiatry  Physical Medicine and Rehabilitation  [facility]

## 2019-03-09 DIAGNOSIS — R20.2 PARESTHESIAS: ICD-10-CM

## 2019-03-12 NOTE — TELEPHONE ENCOUNTER
Was the patient seen in the last year in this department? Yes    Does patient have an active prescription for medications requested? Yes     Do they have a refill on file? No     If a controlled substance, is a new  on file? No     Received Request Via: Pharmacy    If pharmacy requests, is the patient still taking the medication or medication discontinued? yes

## 2019-03-13 RX ORDER — GABAPENTIN 800 MG/1
TABLET ORAL
Qty: 120 TAB | Refills: 0 | Status: SHIPPED | OUTPATIENT
Start: 2019-03-13 | End: 2019-04-08 | Stop reason: SDUPTHER

## 2019-04-19 ENCOUNTER — OFFICE VISIT (OUTPATIENT)
Dept: PHYSICAL MEDICINE AND REHAB | Facility: MEDICAL CENTER | Age: 49
End: 2019-04-19
Payer: MEDICAID

## 2019-04-19 VITALS
TEMPERATURE: 97.4 F | BODY MASS INDEX: 23.28 KG/M2 | OXYGEN SATURATION: 96 % | HEIGHT: 63 IN | SYSTOLIC BLOOD PRESSURE: 110 MMHG | DIASTOLIC BLOOD PRESSURE: 80 MMHG | HEART RATE: 76 BPM | WEIGHT: 131.39 LBS

## 2019-04-19 DIAGNOSIS — F11.90 CHRONIC, CONTINUOUS USE OF OPIOIDS: ICD-10-CM

## 2019-04-19 DIAGNOSIS — M21.942 HAND DEFORMITY, ACQUIRED, LEFT: ICD-10-CM

## 2019-04-19 DIAGNOSIS — M25.561 PAIN IN BOTH KNEES, UNSPECIFIED CHRONICITY: ICD-10-CM

## 2019-04-19 DIAGNOSIS — Z78.9 IMPAIRED MOBILITY AND ADLS: ICD-10-CM

## 2019-04-19 DIAGNOSIS — F17.200 TOBACCO DEPENDENCE: ICD-10-CM

## 2019-04-19 DIAGNOSIS — M25.562 PAIN IN BOTH KNEES, UNSPECIFIED CHRONICITY: ICD-10-CM

## 2019-04-19 DIAGNOSIS — G44.86 CERVICOGENIC HEADACHE: ICD-10-CM

## 2019-04-19 DIAGNOSIS — Z74.09 IMPAIRED MOBILITY AND ADLS: ICD-10-CM

## 2019-04-19 DIAGNOSIS — M47.892 OTHER OSTEOARTHRITIS OF SPINE, CERVICAL REGION: ICD-10-CM

## 2019-04-19 DIAGNOSIS — M53.2X1 ATLANTOAXIAL INSTABILITY: ICD-10-CM

## 2019-04-19 DIAGNOSIS — M05.79 RHEUMATOID ARTHRITIS INVOLVING MULTIPLE SITES WITH POSITIVE RHEUMATOID FACTOR (HCC): ICD-10-CM

## 2019-04-19 PROCEDURE — 99214 OFFICE O/P EST MOD 30 MIN: CPT | Performed by: PHYSICAL MEDICINE & REHABILITATION

## 2019-04-19 RX ORDER — SODIUM CHLORIDE/ALOE VERA
SPRAY, NON-AEROSOL (ML) NASAL
Refills: 0 | COMMUNITY
Start: 2019-04-04 | End: 2019-10-04

## 2019-04-19 RX ORDER — OXYCODONE HYDROCHLORIDE AND ACETAMINOPHEN 5; 325 MG/1; MG/1
1 TABLET ORAL EVERY 6 HOURS PRN
Qty: 120 TAB | Refills: 0 | Status: SHIPPED | OUTPATIENT
Start: 2019-04-28 | End: 2019-05-28

## 2019-04-19 RX ORDER — MONTELUKAST SODIUM 10 MG/1
TABLET ORAL
Refills: 1 | COMMUNITY
Start: 2019-04-04 | End: 2019-08-16

## 2019-04-19 RX ORDER — OXYCODONE HYDROCHLORIDE AND ACETAMINOPHEN 5; 325 MG/1; MG/1
1 TABLET ORAL EVERY 6 HOURS PRN
Qty: 120 TAB | Refills: 0 | Status: SHIPPED | OUTPATIENT
Start: 2019-05-28 | End: 2019-06-14 | Stop reason: SDUPTHER

## 2019-04-19 RX ORDER — BENZONATATE 100 MG/1
CAPSULE ORAL
Refills: 2 | COMMUNITY
Start: 2019-04-04 | End: 2019-10-04

## 2019-04-19 ASSESSMENT — PAIN SCALES - GENERAL: PAINLEVEL: 4=SLIGHT-MODERATE PAIN

## 2019-04-19 ASSESSMENT — PATIENT HEALTH QUESTIONNAIRE - PHQ9
CLINICAL INTERPRETATION OF PHQ2 SCORE: 1
SUM OF ALL RESPONSES TO PHQ QUESTIONS 1-9: 8
5. POOR APPETITE OR OVEREATING: 2 - MORE THAN HALF THE DAYS

## 2019-04-19 NOTE — PROGRESS NOTES
Follow up patient note  Pain Medicine, Interventional spine and sports physiatry, Physical medicine rehabilitation      Chief complaint:   Joint pain      HISTORY    Please see new patient note dated 06/08/2018 by Dr Kerr,  for more details.     [facility]  Patient identification: Mary Martinez 48 y.o. female returns for follow-up of her pain related to rheumatoid arthritis.      Interval history:    She has been continuing to have headaches.  These seem to start in the back of her head and radiates forward.  These have not resolved, she reports that it is worse on the right.    Dr. Valdovinos is planning on monitoring her symptoms and status.  For now, surgery is on hold.  Repeat evaluation in July 2019.    She reports some burning pain in her upper extremities seems to be intermittent.  She continues to take gabapentin 800mg po qid.  No side effects.  Some cracking in her neck still.  This is not painful.    Percocet 5/325 q6h helps keep her symptoms under control.  Regular bowel movements.  She is not taking OTC medications.    Discussed smoking cessation.  She was given chantix, but had mood changes (anger) and did not tolerate this.  Encouraged her to keep working on this goal.    Self-care unchanged.  She is working on getting outside and walking.  Currently, her knee pain is not very bad and is not limiting her walking.  She is feeling encouraged bout this.    ROS  Unchanged from previous visit 2/20/2019 except as noted in the HPI  Red Flags :   Fever, Chills, Sweats: Denies  Involuntary Weight Loss: Denies  Bowel/Bladder Incontinence: Denies      PMHx:   Past Medical History:   Diagnosis Date   • ASTHMA     inhalers prn   • Dental disorder     upper partial   • Pain     everywhere   • Psychiatric problem     anxiety   • Rheumatoid arthritis(714.0) 2003       PSHx:   Past Surgical History:   Procedure Laterality Date   • FINGER ARTHROPLASTY Right 6/5/2017    Procedure: FINGER ARTHROPLASTY - LONG, RING AND SMALL  VOLAR PLATE;  Surgeon: Lobo Rosen M.D.;  Location: SURGERY SAME DAY [facility];  Service:    • FINGER AMPUTATION  6/5/2017    Procedure: FINGER AMPUTATION - LONG, RING AND SMALL AT THE PROXIMAL INTERPHALANGEAL JOINT;  Surgeon: Lobo Rosen M.D.;  Location: SURGERY SAME DAY [facility];  Service:    • FINGER ARTHROPLASTY Right 4/17/2017    Procedure: FINGER ARTHROPLASTY ;  Surgeon: Lobo Rosen M.D.;  Location: SURGERY SAME DAY [facility];  Service:    • FINGER AMPUTATION Right 9/14/2016    Procedure: FINGER AMPUTATION INDEX;  Surgeon: Lobo Rosen M.D.;  Location: SURGERY SAME DAY [facility];  Service:    • IRRIGATION & DEBRIDEMENT ORTHO Right 9/11/2016    Procedure: IRRIGATION & DEBRIDEMENT ORTHO - right index finger;  Surgeon: Madhu Rosen M.D.;  Location: [facility];  Service:    • PIP ARTHRODESIS Right 8/29/2016    Procedure: RE-DO INDEX FINGER PROXIMAL INTERPHALANGEAL ARTHRODESIS;  Surgeon: Lobo Rosen M.D.;  Location: SURGERY SAME DAY [facility];  Service:    • BONE GRAFT Right 8/29/2016    Procedure: BONE GRAFT - DISTAL RADIUS ;  Surgeon: Lobo Rosen M.D.;  Location: SURGERY SAME DAY [facility];  Service:    • ARTHRODESIS Right 5/6/2016    Procedure: ARTHRODESIS INDEX FINGER PROXIMAL INTERPHALANGEAL;  Surgeon: Lobo Rosen M.D.;  Location: SURGERY SAME DAY [facility];  Service:    • FOOT RECONSTRUCTION RHEUMATIC Left 7/29/2015    Procedure: FOOT RECONSTRUCTION RHEUMATIC;  Surgeon: Heriberto Alves M.D.;  Location: [facility];  Service:    • TOE FUSION Right 5/27/2015    Procedure: TOE FUSION 1ST METATARSALPHALANGEAL JOINT;  Surgeon: Heriberto Alves M.D.;  Location: SURGERY [facility];  Service:    • BONE SPUR EXCISION  5/27/2015    Procedure: BONE SPUR EXCISION METATARSAL HEAD 2-3;  Surgeon: Heriberto Alves M.D.;  Location: [facility];  Service:    • HAMMERTOE CORRECTION  5/27/2015     Procedure: HAMMERTOE CORRECTION AND SOFT TISSUE RE-ALINGMENT 2-3 ;  Surgeon: Heriberto Alves M.D.;  Location: SURGERY [facility];  Service:    • EMANUEL BY LAPAROSCOPY  2011    Performed by VERO WRIGHT at SURGERY [facility]   • ABDOMINAL ABSCESS DRAINAGE  2011    Performed by VERO WRIGHT at SURGERY [facility]   • OTHER  1982    tonsils   • APPENDECTOMY     • CHOLECYSTECTOMY     • GYN SURGERY      C section X 4   • OTHER ABDOMINAL SURGERY      small colon block   • PB  DELIVERY ONLY      x 4   • TONSILLECTOMY     • WRIST ORIF         Family history   History reviewed. No pertinent family history.      Medications:   Outpatient Prescriptions Marked as Taking for the 19 encounter (Office Visit) with Jayy Kerr M.D.   Medication Sig Dispense Refill   • [START ON 2019] oxyCODONE-acetaminophen (PERCOCET) 5-325 MG Tab Take 1 Tab by mouth every 6 hours as needed for Severe Pain for up to 30 days. 120 Tab 0   • [START ON 2019] oxyCODONE-acetaminophen (PERCOCET) 5-325 MG Tab Take 1 Tab by mouth every 6 hours as needed for Severe Pain for up to 30 days. 120 Tab 0   • gabapentin (NEURONTIN) 800 MG tablet Take 1 Tab by mouth 4 times a day for 90 days. 360 Tab 0   • alendronate (FOSAMAX) 70 MG Tab      • pantoprazole (PROTONIX) 40 MG Tablet Delayed Response      • paroxetine (PAXIL) 40 MG tablet      • Etanercept (ENBREL) 50 MG/ML Solution Prefilled Syringe Inject  as instructed every 7 days.         Allergies:   Allergies   Allergen Reactions   • Nitrous Oxide Vomiting   • Penicillins Hives     Tolerates cephalosporins       Social Hx:   Social History     Social History   • Marital status:      Spouse name: N/A   • Number of children: N/A   • Years of education: N/A     Occupational History   • Not on file.     Social History Main Topics   • Smoking status: Current Every Day Smoker     Packs/day: 1.00     Years: 30.00     Types: Cigarettes   • Smokeless  "tobacco: Never Used      Comment: 1 ppd   • Alcohol use No   • Drug use: No   • Sexual activity: Not on file     Other Topics Concern   •  Service No   • Blood Transfusions Yes   • Caffeine Concern No   • Occupational Exposure No   • Hobby Hazards No   • Sleep Concern No   • Stress Concern Yes   • Weight Concern No   • Special Diet No   • Back Care No   • Exercise Yes   • Bike Helmet Yes   • Seat Belt Yes   • Self-Exams Yes     Social History Narrative    ** Merged History Encounter **              EXAMINATION     Physical Exam:   Vitals: /80 (BP Location: Right arm, Patient Position: Sitting, BP Cuff Size: Adult long)   Pulse 76   Temp 36.3 °C (97.4 °F) (Temporal)   Ht 1.6 m (5' 3\")   Wt 59.6 kg (131 lb 6.3 oz)   SpO2 96%     Constitutional:   Body Habitus: Body mass index is 23.28 kg/m².  Cooperation: Fully cooperates with exam  Appearance: Well-groomed no disheveled, in no acute distress  Respiratory-  breathing comfortable on room air, no audible wheezing  Cardiovascular-  No lower extremity edema is noted.   Psychiatric- alert and oriented ×3. Normal affect.   Musculoskeletal:  Cervical spine  No occipital tenderness.  Tenderness over the cervical paraspinals, greater on the right C3/4 region bilaterally.  Milder tenderness over C5/6 bilaterally.  Decreased ROM of the shoulders, left greater than right. Crepitus and \"clunking\" of the left shoulder with active forward flexion.  MMT: 4/5 shoulder abduction on the left and 4/5 on the right, elbow flexion is 5-/5 on the right and 4+/5 on the left, elbow extension is 5/5 bilaterally, wrist extension 4/5 bilaterally.  3+/5 on the left.  Limited ability to determine MMT distally due to amputations on the right.     Reflexes 2+ biceps, triceps bilaterally, 2+ patella and achilles bilaterally    Partial hand amputation on the right at the MCPs; ulnar deviation on the left with MCP subluxation, mild atrophy of the hand intrinsics with wasting of " the thenar eminence.  Unchanged.      Mild tenderness over the left wrist, no warmth or erythema.  Negative compression test.      MEDICAL DECISION MAKING    DATA    Labs: UDS 10/30/2018 consistent with medications.  Oxycodone and metabolites without other substances        Imaging:  No new imaging  MRI cervical spine 07/31/2018:  Films reviewed.  These are my reads:  There is is note of motion at the atlantodental level with 5mm atlantodental inverval in flexion that reduces to 1-2 mm in extension.  Mild retrolisthesis of C4 on C5 and anterolisthesis of C5- on C6.  Disc extrusion at C6-7 with mild central canal stenosis    Xray left shoulder 2/5/2018 Humeral head is elevated.  Glenohumeral joint arthritis.    Reports reviewed:  Xray cervical spine 11/14/2018  1. No acute fracture  2. Persistent motion at the C1-2 joint as before, suggesting atlantoaxial instability  3. Minimal instability noted at C5-6 level as described.    MRI cervical spine 07/31/2018  1. Widening of the atlantodental interal in neutral and flexion positions with a 5mm space which reduces to 1-2 mm in extension  2. 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 Rheumatoid arthritis involving multiple sites with positive rheumatoid factor (HCC) M05.79   3. Hand deformity, acquired, left M21.942   4. Impaired mobility and ADLs Z74.09   5. Pain in both knees, unspecified chronicity M25.561    M25.562   6. Tobacco dependence F17.200   7. Other osteoarthritis of spine, cervical region M47.892   8. Cervicogenic headache R51   9. Chronic, continuous use of opioids F11.90         ASSESSMENT and PLAN:     Mary Martinez 48 y.o. female with rheumatoid arthritis    Mary was seen  today for follow-up.    Diagnoses and all orders for this visit:    Rheumatoid arthritis involving multiple sites with positive rheumatoid factor (HCC)  -     CONSENT FOR OPIATE PRESCRIPTION - on file  -     oxyCODONE-acetaminophen (PERCOCET) 5-325 MG Tab; Take 1 Tab by mouth every 8 hours as needed for Severe Pain for up to 30 days.    Hand deformity, acquired, left    Amputation right hand, sequela    Chronic, continuous use of opioids    Atlantoaxial instability    Osteoarthritis of the cervical spine  Cervicogenic headaches          -      Continue gabapentin 800mg po QID. This was refilled for 90 days on 04/09/2019.    Discussed risks, benefits and expectations of diagnostic and therapeutic right C3, C4 TON medial branch blocks.  Risks include infection and hematoma and rarely more serious neurologic problems.  She will hold Embrel the week of the injection.  She would like to proceed.  Discussed possible injection on the left, depending on how she does.    Refill percocet for future fill on 05/28/2019.  UDS checked today.    Continue to follow-up with Dr. Valdovinos to discuss surgical options.    Symptoms that were consistent with cervical radiculopathies noted on EMG seem to be improving.      Plan to continue percocet for chronic pain.  Continue to follow-up with Rheumatology for management of other medications related to rheumatoid arthritis.    Encouraged her to continue with efforts to quit smoking.    In prescribing controlled substances to this patient, I certify that I have obtained and reviewed the medical history of Mary Estes Martinez. I have also made a good aristides effort to obtain applicable records from other providers who have treated the patient and records did not demonstrate any increased risk of substance abuse that would prevent me from prescribing controlled substances.     I have conducted a physical exam and documented it. I have reviewed Ms. Martinez’s prescription history as maintained by  the [place] Prescription Monitoring Program.   ORT 4, indicates moderate risk    I have assessed the patient’s risk for abuse, dependency, and addiction using the validated Opioid Risk Tool available at https://www.mdcalc.com/gavjhe-yxkb-hdgo-ort-narcotic-abuse.     Given the above, I believe the benefits of controlled substance therapy outweigh the risks. The reasons for prescribing controlled substances include non-narcotic, oral analgesic alternatives have been inadequate for pain control and in my professional opinion, controlled substances are the only reasonable choice for this patient because her pain was note adequately controlled with alternatives and she has chronic progressive disease. Accordingly, I have discussed the risk and benefits, treatment plan, and alternative therapies with the patient.         Follow up: 2 months and for injection      Please note that this dictation was created using voice recognition software. I have made every reasonable attempt to correct obvious errors but there may be errors of grammar and content that I may have overlooked prior to finalization of this note.      Jayy Kerr MD  Interventional Spine and Sports Physiatry  Physical Medicine and Rehabilitation  [facility]

## 2019-05-01 ENCOUNTER — HOSPITAL ENCOUNTER (OUTPATIENT)
Dept: PAIN MANAGEMENT | Facility: REHABILITATION | Age: 49
End: 2019-05-01
Attending: PHYSICAL MEDICINE & REHABILITATION
Payer: MEDICAID

## 2019-05-01 ENCOUNTER — HOSPITAL ENCOUNTER (OUTPATIENT)
Dept: RADIOLOGY | Facility: REHABILITATION | Age: 49
End: 2019-05-01
Attending: PHYSICAL MEDICINE & REHABILITATION

## 2019-05-01 VITALS
OXYGEN SATURATION: 99 % | SYSTOLIC BLOOD PRESSURE: 107 MMHG | RESPIRATION RATE: 15 BRPM | HEIGHT: 63 IN | HEART RATE: 66 BPM | WEIGHT: 129.19 LBS | DIASTOLIC BLOOD PRESSURE: 71 MMHG | TEMPERATURE: 98.2 F | BODY MASS INDEX: 22.89 KG/M2

## 2019-05-01 PROCEDURE — 700101 HCHG RX REV CODE 250

## 2019-05-01 PROCEDURE — 64492 INJ PARAVERT F JNT C/T 3 LEV: CPT

## 2019-05-01 PROCEDURE — 700111 HCHG RX REV CODE 636 W/ 250 OVERRIDE (IP)

## 2019-05-01 PROCEDURE — 700117 HCHG RX CONTRAST REV CODE 255

## 2019-05-01 PROCEDURE — 64491 INJ PARAVERT F JNT C/T 2 LEV: CPT

## 2019-05-01 PROCEDURE — 64490 INJ PARAVERT F JNT C/T 1 LEV: CPT

## 2019-05-01 RX ORDER — DEXAMETHASONE SODIUM PHOSPHATE 10 MG/ML
INJECTION, SOLUTION INTRAMUSCULAR; INTRAVENOUS
Status: COMPLETED
Start: 2019-05-01 | End: 2019-05-01

## 2019-05-01 RX ORDER — LIDOCAINE HYDROCHLORIDE 20 MG/ML
INJECTION, SOLUTION EPIDURAL; INFILTRATION; INTRACAUDAL; PERINEURAL
Status: COMPLETED
Start: 2019-05-01 | End: 2019-05-01

## 2019-05-01 RX ADMIN — IOHEXOL 2 ML: 240 INJECTION, SOLUTION INTRATHECAL; INTRAVASCULAR; INTRAVENOUS; ORAL at 11:06

## 2019-05-01 RX ADMIN — DEXAMETHASONE SODIUM PHOSPHATE 10 MG: 10 INJECTION, SOLUTION INTRAMUSCULAR; INTRAVENOUS at 11:08

## 2019-05-01 RX ADMIN — LIDOCAINE HYDROCHLORIDE 1 ML: 20 INJECTION, SOLUTION EPIDURAL; INFILTRATION; INTRACAUDAL; PERINEURAL at 11:08

## 2019-05-01 NOTE — PROCEDURES
Date of Service: see epic time stamp for DOS    Patient: Mary Martinez 49 y.o. female     MRN: [id number]     Physician/s: Jayy Kerr MD    Pre-operative Diagnosis: Other spondylosis, cervical region, cervicogenic headache    Post-operative Diagnosis: Other spondylosis, cervical region, cervicogenic headaches    Procedure: Right C3, C4, TON medial branch blocks    Description of procedure:    The risks, benefits, and alternatives of the procedure were reviewed and discussed with the patient.  Written informed consent was freely obtained. A pre-procedural time-out was conducted by the physician verifying patient’s identity, procedure to be performed, procedure site and side, and allergy verification. Appropriate equipment was determined to be in place for the procedure.     The patient's vital signs were carefully monitored before, throughout, and after the procedure.     In the fluoroscopy suite the patient was placed in a prone position, a pillow placed underneath their chest, and the head was turned to the opposite side of injection. The skin was prepped and draped in the usual sterile fashion. The fluoroscope was placed over the cervical neck at the appropriate injection angles, and the targets for injection were marked at the right C3, right C4, and right third occipital nerve (TON) levels. A 27g 1.5'' needle was placed into each of the markings levels as above, and approx 0.5mL of 1% Lidocaine was injected subcutaneously into the epidermal and dermal layers. The needle was removed. A 25g 2'' needle was then placed at the lateral aspect of the articular pillars, whereby the medial branch runs, at the C3, C4 and TON levels on the RIGHT side. The needle tips were then verified by AP, contralateral oblique, and lateral views. In the AP view, less than 1 cc of contrast dye was used to highlight the medial branch while the fluoroscope was running live. Following negative aspiration, approx 0.4mL 2% lidocaine  without epinephrine and 0.33ml of dexamethasone (10mg/ml) was then injected at the above levels, and the needles were removed intact after restyleted. The patient's back was covered with a 4x4 gauze, the area was cleansed with sterile normal saline, and a dressing was applied.     There were no complications noted, and patient was hemodynamically stable before and after the procedure.  She noted decrease in her usual pain from 6 to a 3 prior to discharge.     Follow-up visit is scheduled for 5/14/2019    Jayy Kerr MD  Physical Medicine and Rehabilitation  Interventional Spine and Sports Physiatry  [facility]

## 2019-05-01 NOTE — NON-PROVIDER
0948 AM.  Denied taking any blood thinners and  any anti- inflammatories medications. Home care education and verbal instruction given to patient and verbalized understanding.Patient had a  ( Lobo/ son ). Stop bang score # 1 .   Dr. Kerr made aware and assessed patient. . Hand off reported to SUNDEEP Nixon RN.

## 2019-05-01 NOTE — NON-PROVIDER
1132 AM    .  Tolerated fluids well.  Ice pack applied to affected area. Patient able to  move all extremities without difficulty voluntarily and on command. Reviewed home care instructions and understood by patient. Dr. Kerr evaluated patient.Pain diary sheet, instruction given and to her son and verbalized understanding.

## 2019-05-02 ENCOUNTER — TELEPHONE (OUTPATIENT)
Dept: PHYSICAL MEDICINE AND REHAB | Facility: MEDICAL CENTER | Age: 49
End: 2019-05-02

## 2019-05-02 NOTE — TELEPHONE ENCOUNTER
I called patient to follow after her special procedure an leave a voicemail to call back an inform.    Thank you.

## 2019-05-14 ENCOUNTER — OFFICE VISIT (OUTPATIENT)
Dept: PHYSICAL MEDICINE AND REHAB | Facility: MEDICAL CENTER | Age: 49
End: 2019-05-14
Payer: MEDICAID

## 2019-05-14 VITALS
OXYGEN SATURATION: 94 % | SYSTOLIC BLOOD PRESSURE: 128 MMHG | HEIGHT: 63 IN | DIASTOLIC BLOOD PRESSURE: 86 MMHG | HEART RATE: 94 BPM | TEMPERATURE: 98.6 F | BODY MASS INDEX: 22.7 KG/M2 | WEIGHT: 128.09 LBS

## 2019-05-14 DIAGNOSIS — G44.86 CERVICOGENIC HEADACHE: ICD-10-CM

## 2019-05-14 DIAGNOSIS — M47.892 OTHER OSTEOARTHRITIS OF SPINE, CERVICAL REGION: ICD-10-CM

## 2019-05-14 PROCEDURE — 99213 OFFICE O/P EST LOW 20 MIN: CPT | Performed by: PHYSICAL MEDICINE & REHABILITATION

## 2019-05-14 RX ORDER — LANOLIN ALCOHOL/MO/W.PET/CERES
CREAM (GRAM) TOPICAL
Refills: 3 | COMMUNITY
Start: 2019-05-08 | End: 2019-10-04

## 2019-05-14 RX ORDER — DEXAMETHASONE SODIUM PHOSPHATE 10 MG/ML
INJECTION, SOLUTION INTRAMUSCULAR; INTRAVENOUS
COMMUNITY
Start: 2019-05-01 | End: 2019-08-16

## 2019-05-14 ASSESSMENT — PATIENT HEALTH QUESTIONNAIRE - PHQ9: CLINICAL INTERPRETATION OF PHQ2 SCORE: 0

## 2019-05-14 ASSESSMENT — PAIN SCALES - GENERAL: PAINLEVEL: 4=SLIGHT-MODERATE PAIN

## 2019-05-14 NOTE — PROGRESS NOTES
Follow up patient note  Pain Medicine, Interventional spine and sports physiatry, Physical medicine rehabilitation      Chief complaint:   Joint pain      HISTORY    Please see new patient note dated 06/08/2018 by Dr Kerr,  for more details.     [facility]  Patient identification: Mary Martinez 49 y.o. female returns for follow-up of her pain related to rheumatoid arthritis.      Interval history:    Since the right C3, C4, TON diagnostic and therapeutic blocks, she has had significant improvement on the right-sided neck pain.  Her headaches are better.  She is even able to lift her right arm up over her head to do her hair.  This is a big thing for her.  The pain was reduced by more than 60% and has continued to be improved.    The left side is more painful now and she has been having left-sided headache more shoulder pain.    Dr. Valdovinos is planning on monitoring her symptoms and status.  For now, surgery is on hold.  Repeat evaluation in July 2019.    She reports some burning pain in her upper extremities seems to be intermittent.  She continues to take gabapentin 800mg po qid.  No side effects.  Some cracking in her neck still.  This is not painful.    Percocet 5/325 q6h helps keep her symptoms under control.  Regular bowel movements.  She is not taking OTC medications.    Discussed smoking cessation.  She was given chantix, but had mood changes (anger) and did not tolerate this.  Encouraged her to keep working on this goal.  She is still working on this.    ROS  Unchanged from previous visit 2/20/2019 except as noted in the HPI  Red Flags :   Fever, Chills, Sweats: Denies  Involuntary Weight Loss: Denies  Bowel/Bladder Incontinence: Denies      PMHx:   Past Medical History:   Diagnosis Date   • ASTHMA     inhalers prn   • Dental disorder     upper partial   • Pain     everywhere   • Psychiatric problem     anxiety   • Rheumatoid arthritis(714.0) 2003       PSHx:   Past Surgical History:   Procedure Laterality  Date   • FINGER ARTHROPLASTY Right 6/5/2017    Procedure: FINGER ARTHROPLASTY - LONG, RING AND SMALL VOLAR PLATE;  Surgeon: Lobo Rosen M.D.;  Location: SURGERY SAME DAY [facility];  Service:    • FINGER AMPUTATION  6/5/2017    Procedure: FINGER AMPUTATION - LONG, RING AND SMALL AT THE PROXIMAL INTERPHALANGEAL JOINT;  Surgeon: Lobo Rosen M.D.;  Location: SURGERY SAME DAY [facility];  Service:    • FINGER ARTHROPLASTY Right 4/17/2017    Procedure: FINGER ARTHROPLASTY ;  Surgeon: Lobo Rosen M.D.;  Location: SURGERY SAME DAY [facility];  Service:    • FINGER AMPUTATION Right 9/14/2016    Procedure: FINGER AMPUTATION INDEX;  Surgeon: Lobo Rosen M.D.;  Location: SURGERY SAME DAY [facility];  Service:    • IRRIGATION & DEBRIDEMENT ORTHO Right 9/11/2016    Procedure: IRRIGATION & DEBRIDEMENT ORTHO - right index finger;  Surgeon: Madhu Rosen M.D.;  Location: [facility];  Service:    • PIP ARTHRODESIS Right 8/29/2016    Procedure: RE-DO INDEX FINGER PROXIMAL INTERPHALANGEAL ARTHRODESIS;  Surgeon: Lobo Rosen M.D.;  Location: SURGERY SAME DAY [facility];  Service:    • BONE GRAFT Right 8/29/2016    Procedure: BONE GRAFT - DISTAL RADIUS ;  Surgeon: Lobo Rosen M.D.;  Location: SURGERY SAME DAY [facility];  Service:    • ARTHRODESIS Right 5/6/2016    Procedure: ARTHRODESIS INDEX FINGER PROXIMAL INTERPHALANGEAL;  Surgeon: Lobo Rosen M.D.;  Location: SURGERY SAME DAY [facility];  Service:    • FOOT RECONSTRUCTION RHEUMATIC Left 7/29/2015    Procedure: FOOT RECONSTRUCTION RHEUMATIC;  Surgeon: Heriberto Alves M.D.;  Location: [facility];  Service:    • TOE FUSION Right 5/27/2015    Procedure: TOE FUSION 1ST METATARSALPHALANGEAL JOINT;  Surgeon: Heriberto Alves M.D.;  Location: [facility];  Service:    • BONE SPUR EXCISION  5/27/2015    Procedure: BONE SPUR EXCISION METATARSAL HEAD 2-3;  Surgeon:  Heriebrto Alves M.D.;  Location: SURGERY [facility];  Service:    • HAMMERTOE CORRECTION  2015    Procedure: HAMMERTOE CORRECTION AND SOFT TISSUE RE-ALINGMENT 2-3 ;  Surgeon: Heriberto Alves M.D.;  Location: SURGERY [facility];  Service:    • EMANUEL BY LAPAROSCOPY  2011    Performed by VERO WRIGHT at SURGERY [facility] ORS   • ABDOMINAL ABSCESS DRAINAGE  2011    Performed by VERO WRIGHT at SURGERY [facility] ORS   • OTHER  1982    tonsils   • APPENDECTOMY     • CHOLECYSTECTOMY     • GYN SURGERY      C section X 4   • OTHER ABDOMINAL SURGERY      small colon block   • PB  DELIVERY ONLY      x 4   • TONSILLECTOMY     • WRIST ORIF         Family history   History reviewed. No pertinent family history.      Medications:   Outpatient Prescriptions Marked as Taking for the 19 encounter (Office Visit) with Jayy Kerr M.D.   Medication Sig Dispense Refill   • Cyanocobalamin (VITAMIN B-12) 1000 MCG Tab TAKE ONE TABLET BY MOUTH EVERY DAY FOR VITAMIN B12 SUPPLEMENTATION. TAKE WITH FOLIC ACID.  3   • folic acid (FOLVITE) 400 MCG tablet TAKE ONE TABLET BY MOUTH EVERY DAY FOR FOLIC ACID SUPPLEMENTATION. TAKE WITH VITAMIN B12  3   • dexamethasone pf (DECADRON) 10 MG/ML Solution      • oxyCODONE-acetaminophen (PERCOCET) 5-325 MG Tab Take 1 Tab by mouth every 6 hours as needed for Severe Pain for up to 30 days. 120 Tab 0   • gabapentin (NEURONTIN) 800 MG tablet Take 1 Tab by mouth 4 times a day for 90 days. 360 Tab 0   • alendronate (FOSAMAX) 70 MG Tab      • pantoprazole (PROTONIX) 40 MG Tablet Delayed Response      • paroxetine (PAXIL) 40 MG tablet      • Etanercept (ENBREL) 50 MG/ML Solution Prefilled Syringe Inject  as instructed every 7 days.     • albuterol (VENTOLIN OR PROVENTIL) 108 (90 BASE) MCG/ACT AERS inhalation aerosol Inhale 2 Puffs by mouth as needed for Shortness of Breath.         Allergies:   Allergies   Allergen Reactions   • Nitrous Oxide Vomiting   •  "Penicillins Hives     Tolerates cephalosporins       Social Hx:   Social History     Social History   • Marital status:      Spouse name: N/A   • Number of children: N/A   • Years of education: N/A     Occupational History   • Not on file.     Social History Main Topics   • Smoking status: Current Every Day Smoker     Packs/day: 1.00     Years: 30.00     Types: Cigarettes   • Smokeless tobacco: Never Used      Comment: 1 ppd   • Alcohol use No   • Drug use: No   • Sexual activity: Not on file     Other Topics Concern   •  Service No   • Blood Transfusions Yes   • Caffeine Concern No   • Occupational Exposure No   • Hobby Hazards No   • Sleep Concern No   • Stress Concern Yes   • Weight Concern No   • Special Diet No   • Back Care No   • Exercise Yes   • Bike Helmet Yes   • Seat Belt Yes   • Self-Exams Yes     Social History Narrative    ** Merged History Encounter **              EXAMINATION     Physical Exam:   Vitals: /86 (BP Location: Left arm, Patient Position: Sitting, BP Cuff Size: Adult long)   Pulse 94   Temp 37 °C (98.6 °F) (Temporal)   Ht 1.6 m (5' 3\")   Wt 58.1 kg (128 lb 1.4 oz)   SpO2 94%     Constitutional:   Body Habitus: Body mass index is 22.69 kg/m².  Cooperation: Fully cooperates with exam  Appearance: Well-groomed no disheveled, in no acute distress  Respiratory-  breathing comfortable on room air, no audible wheezing  Cardiovascular-  No lower extremity edema is noted.   Psychiatric- alert and oriented ×3. Normal affect.   Musculoskeletal:  Cervical spine:  No occipital tenderness.  Tenderness over the left cervical paraspinals.  No tenderness over the right cervical paraspinals.  Full ROM on the right shoulder and decreased ROM of the shoulder on the left.  MMT: 4/5 shoulder abduction on the left and 4/5 on the right, elbow flexion is 5-/5 on the right and 4+/5 on the left, elbow extension is 5/5 bilaterally, wrist extension 4/5 bilaterally.  3+/5 on the left.  " Limited ability to determine MMT distally due to amputations on the right.     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  Unchanged.      Mild tenderness over the left wrist, no warmth or erythema.  Negative compression test.      MEDICAL DECISION MAKING    DATA    Labs: UDS 10/30/2018 consistent with medications.  Oxycodone and metabolites without other substances   UDS 04/19/2019 consistent with medications. Oxycodone and metabolites without other substances        Imaging:  No new imaging  MRI cervical spine 07/31/2018:  Films reviewed.  These are my reads:  There is is note of motion at the atlantodental level with 5mm atlantodental inverval in flexion that reduces to 1-2 mm in extension.  Mild retrolisthesis of C4 on C5 and anterolisthesis of C5- on C6.  Disc extrusion at C6-7 with mild central canal stenosis    Xray left shoulder 2/5/2018 Humeral head is elevated.  Glenohumeral joint arthritis.    Reports reviewed:  Xray cervical spine 11/14/2018  1. No acute fracture  2. Persistent motion at the C1-2 joint as before, suggesting atlantoaxial instability  3. Minimal instability noted at C5-6 level as described.    MRI cervical spine 07/31/2018  1. Widening of the atlantodental interal in neutral and flexion positions with a 5mm space which reduces to 1-2 mm in extension  2. 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 Cervicogenic headache R51   2. Other osteoarthritis of spine, cervical region M47.892         ASSESSMENT and PLAN:     Mary Martinez 49 y.o. female  with rheumatoid arthritis    Mary was seen today for follow-up.    Diagnoses and all orders for this visit:    Osteoarthritis of the cervical spine  Cervicogenic headaches          -      Continue gabapentin 800mg po QID. This was refilled for 90 days on 04/09/2019.    Discussed risks, benefits and expectations of diagnostic and therapeutic left C3, C4 TON medial branch blocks.  Risks include infection and hematoma and rarely more serious neurologic problems.  She will hold Embrel the week of the injection.  She would like to proceed.      We discussed possible repeat injection on the right in the future.      Given the improvement, she is happy with the results on the right for now.  We can revisit in the future.    Refill percocet for future fill on 05/28/2019, done previously.    Continue to follow-up with Dr. Valdovinos to discuss surgical options.    Plan to continue percocet for chronic pain.  Continue to follow-up with Rheumatology for management of other medications related to rheumatoid arthritis.    Encouraged her to continue with efforts to quit smoking.    In prescribing controlled substances to this patient, I certify that I have obtained and reviewed the medical history of Mary Martinez. I have also made a good aristides effort to obtain applicable records from other providers who have treated the patient and records did not demonstrate any increased risk of substance abuse that would prevent me from prescribing controlled substances.     I have conducted a physical exam and documented it. I have reviewed Ms. Martinez’s prescription history as maintained by the [place] Prescription Monitoring Program.   ORT 4, indicates moderate risk    I have assessed the patient’s risk for abuse, dependency, and addiction using the validated Opioid Risk Tool available at https://www.mdcalc.com/ekkffj-wpii-zjkb-ort-narcotic-abuse.     Given the above, I believe the benefits of controlled substance therapy outweigh the  risks. The reasons for prescribing controlled substances include non-narcotic, oral analgesic alternatives have been inadequate for pain control and in my professional opinion, controlled substances are the only reasonable choice for this patient because her pain was note adequately controlled with alternatives and she has chronic progressive disease. Accordingly, I have discussed the risk and benefits, treatment plan, and alternative therapies with the patient.         Follow up: For injection and follow-up      Please note that this dictation was created using voice recognition software. I have made every reasonable attempt to correct obvious errors but there may be errors of grammar and content that I may have overlooked prior to finalization of this note.      Jayy Kerr MD  Interventional Spine and Sports Physiatry  Physical Medicine and Rehabilitation  [facility]

## 2019-05-29 ENCOUNTER — HOSPITAL ENCOUNTER (OUTPATIENT)
Dept: RADIOLOGY | Facility: REHABILITATION | Age: 49
End: 2019-05-29
Attending: PHYSICAL MEDICINE & REHABILITATION

## 2019-05-29 ENCOUNTER — HOSPITAL ENCOUNTER (OUTPATIENT)
Dept: PAIN MANAGEMENT | Facility: REHABILITATION | Age: 49
End: 2019-05-29
Attending: PHYSICAL MEDICINE & REHABILITATION
Payer: MEDICAID

## 2019-05-29 VITALS
RESPIRATION RATE: 16 BRPM | HEIGHT: 63 IN | HEART RATE: 66 BPM | WEIGHT: 127.43 LBS | SYSTOLIC BLOOD PRESSURE: 110 MMHG | OXYGEN SATURATION: 97 % | BODY MASS INDEX: 22.58 KG/M2 | DIASTOLIC BLOOD PRESSURE: 70 MMHG | TEMPERATURE: 98.1 F

## 2019-05-29 PROCEDURE — 64491 INJ PARAVERT F JNT C/T 2 LEV: CPT

## 2019-05-29 PROCEDURE — 64490 INJ PARAVERT F JNT C/T 1 LEV: CPT

## 2019-05-29 PROCEDURE — 700111 HCHG RX REV CODE 636 W/ 250 OVERRIDE (IP)

## 2019-05-29 PROCEDURE — 700117 HCHG RX CONTRAST REV CODE 255

## 2019-05-29 PROCEDURE — 700101 HCHG RX REV CODE 250

## 2019-05-29 RX ORDER — LIDOCAINE HYDROCHLORIDE 20 MG/ML
INJECTION, SOLUTION EPIDURAL; INFILTRATION; INTRACAUDAL; PERINEURAL
Status: COMPLETED
Start: 2019-05-29 | End: 2019-05-29

## 2019-05-29 RX ORDER — DEXAMETHASONE SODIUM PHOSPHATE 10 MG/ML
INJECTION, SOLUTION INTRAMUSCULAR; INTRAVENOUS
Status: COMPLETED
Start: 2019-05-29 | End: 2019-05-29

## 2019-05-29 RX ADMIN — IOHEXOL 5 ML: 240 INJECTION, SOLUTION INTRATHECAL; INTRAVASCULAR; INTRAVENOUS; ORAL at 11:26

## 2019-05-29 RX ADMIN — LIDOCAINE HYDROCHLORIDE 2 ML: 20 INJECTION, SOLUTION EPIDURAL; INFILTRATION; INTRACAUDAL; PERINEURAL at 11:30

## 2019-05-29 RX ADMIN — DEXAMETHASONE SODIUM PHOSPHATE 10 MG: 10 INJECTION, SOLUTION INTRAMUSCULAR; INTRAVENOUS at 11:26

## 2019-05-29 NOTE — PROCEDURES
Date of Service: see epic time stamp for DOS     Patient: Mary Martinez 49 y.o. female     MRN: [id number]      Physician/s: Jayy Kerr MD     Pre-operative Diagnosis: Other spondylosis, cervical region, cervicogenic headache     Post-operative Diagnosis: Other spondylosis, cervical region, cervicogenic headaches     Procedure: Left C3, C4, TON medial branch blocks     Description of procedure:     The risks, benefits, and alternatives of the procedure were reviewed and discussed with the patient.  Written informed consent was freely obtained. A pre-procedural time-out was conducted by the physician verifying patient’s identity, procedure to be performed, procedure site and side, and allergy verification. Appropriate equipment was determined to be in place for the procedure.      The patient's vital signs were carefully monitored before, throughout, and after the procedure.      In the fluoroscopy suite the patient was placed in a prone position, a pillow placed underneath their chest, and the head was turned to the opposite side of injection. The skin was prepped and draped in the usual sterile fashion. The fluoroscope was placed over the cervical neck at the appropriate injection angles, and the targets for injection were marked at the left C3, left C4, and left third occipital nerve (TON) levels. A 27g 1.5'' needle was placed into each of the markings levels as above, and approx 0.5mL of 1% Lidocaine was injected subcutaneously into the epidermal and dermal layers. The needle was removed. A 25g 3.5 inch needle was then placed at the lateral aspect of the articular pillars, whereby the medial branch runs, at the C3, C4 and TON levels on the left side. The needle tips were then verified by AP, contralateral oblique, and lateral views. In the AP view, less than 1 cc of contrast dye was used to highlight the medial branch while the fluoroscope was running live. Following negative aspiration, approx 0.33mL 2%  lidocaine without epinephrine and 0.33ml of dexamethasone (10mg/ml) was then injected at the above levels, and the needles were removed intact after restyleted. The patient's back was covered with a 4x4 gauze, the area was cleansed with sterile normal saline, and a dressing was applied.      There were no complications noted, and patient was hemodynamically stable before and after the procedure.  She noted decrease in her usual pain from 5 to a 1 prior to discharge.        Jayy Kerr MD  Physical Medicine and Rehabilitation  Interventional Spine and Sports Physiatry  [facility]

## 2019-05-30 ENCOUNTER — TELEPHONE (OUTPATIENT)
Dept: PHYSICAL MEDICINE AND REHAB | Facility: MEDICAL CENTER | Age: 49
End: 2019-05-30

## 2019-05-30 NOTE — TELEPHONE ENCOUNTER
Called and spoke to patient regarding her SP from yesterday. Patient stated she is doing great. She did say she was having some trouble with the other shoulder last night but it was gone by morning. I advised patient to please give us a call if she anything was to change.

## 2019-06-14 ENCOUNTER — OFFICE VISIT (OUTPATIENT)
Dept: PHYSICAL MEDICINE AND REHAB | Facility: MEDICAL CENTER | Age: 49
End: 2019-06-14
Payer: MEDICAID

## 2019-06-14 ENCOUNTER — APPOINTMENT (OUTPATIENT)
Dept: RADIOLOGY | Facility: IMAGING CENTER | Age: 49
End: 2019-06-14
Attending: EMERGENCY MEDICINE
Payer: MEDICAID

## 2019-06-14 ENCOUNTER — OFFICE VISIT (OUTPATIENT)
Dept: URGENT CARE | Facility: CLINIC | Age: 49
End: 2019-06-14
Payer: MEDICAID

## 2019-06-14 VITALS
WEIGHT: 131 LBS | RESPIRATION RATE: 18 BRPM | HEIGHT: 63 IN | BODY MASS INDEX: 23.21 KG/M2 | TEMPERATURE: 98.1 F | OXYGEN SATURATION: 97 % | HEART RATE: 95 BPM | SYSTOLIC BLOOD PRESSURE: 114 MMHG | DIASTOLIC BLOOD PRESSURE: 82 MMHG

## 2019-06-14 VITALS
SYSTOLIC BLOOD PRESSURE: 128 MMHG | TEMPERATURE: 98 F | WEIGHT: 130.29 LBS | DIASTOLIC BLOOD PRESSURE: 78 MMHG | HEART RATE: 92 BPM | BODY MASS INDEX: 23.09 KG/M2 | HEIGHT: 63 IN | OXYGEN SATURATION: 95 %

## 2019-06-14 DIAGNOSIS — F32.A DEPRESSION, UNSPECIFIED DEPRESSION TYPE: ICD-10-CM

## 2019-06-14 DIAGNOSIS — M53.2X1 ATLANTOAXIAL INSTABILITY: ICD-10-CM

## 2019-06-14 DIAGNOSIS — S68.411S AMPUTATION OF RIGHT HAND, SEQUELA (HCC): ICD-10-CM

## 2019-06-14 DIAGNOSIS — L08.9 FINGER INFECTION: ICD-10-CM

## 2019-06-14 DIAGNOSIS — M05.79 RHEUMATOID ARTHRITIS INVOLVING MULTIPLE SITES WITH POSITIVE RHEUMATOID FACTOR (HCC): ICD-10-CM

## 2019-06-14 DIAGNOSIS — M21.942 HAND DEFORMITY, ACQUIRED, LEFT: ICD-10-CM

## 2019-06-14 DIAGNOSIS — L98.9 LESION OF FINGER: ICD-10-CM

## 2019-06-14 DIAGNOSIS — M19.019 SHOULDER ARTHRITIS: ICD-10-CM

## 2019-06-14 DIAGNOSIS — G44.86 CERVICOGENIC HEADACHE: ICD-10-CM

## 2019-06-14 DIAGNOSIS — M47.892 OTHER OSTEOARTHRITIS OF SPINE, CERVICAL REGION: ICD-10-CM

## 2019-06-14 DIAGNOSIS — R20.2 PARESTHESIAS: ICD-10-CM

## 2019-06-14 PROCEDURE — 99214 OFFICE O/P EST MOD 30 MIN: CPT | Performed by: PHYSICAL MEDICINE & REHABILITATION

## 2019-06-14 PROCEDURE — 73140 X-RAY EXAM OF FINGER(S): CPT | Mod: RT | Performed by: EMERGENCY MEDICINE

## 2019-06-14 PROCEDURE — 99203 OFFICE O/P NEW LOW 30 MIN: CPT | Performed by: EMERGENCY MEDICINE

## 2019-06-14 RX ORDER — OXYCODONE HYDROCHLORIDE AND ACETAMINOPHEN 5; 325 MG/1; MG/1
1 TABLET ORAL EVERY 6 HOURS PRN
Qty: 120 TAB | Refills: 0 | Status: SHIPPED | OUTPATIENT
Start: 2019-06-27 | End: 2019-07-19 | Stop reason: SDUPTHER

## 2019-06-14 RX ORDER — CLINDAMYCIN HYDROCHLORIDE 300 MG/1
300 CAPSULE ORAL 3 TIMES DAILY
Qty: 21 CAP | Refills: 0 | Status: SHIPPED | OUTPATIENT
Start: 2019-06-14 | End: 2019-06-21

## 2019-06-14 RX ORDER — GABAPENTIN 800 MG/1
800 TABLET ORAL 4 TIMES DAILY
Qty: 360 TAB | Refills: 0 | Status: SHIPPED | OUTPATIENT
Start: 2019-06-14 | End: 2019-08-16 | Stop reason: SDUPTHER

## 2019-06-14 ASSESSMENT — ENCOUNTER SYMPTOMS
FOCAL WEAKNESS: 0
SHORTNESS OF BREATH: 0
VOMITING: 0
SENSORY CHANGE: 0
SORE THROAT: 0
JOINT SWELLING: 0
ABDOMINAL PAIN: 0
DIARRHEA: 0
COUGH: 0
FEVER: 1

## 2019-06-14 ASSESSMENT — PATIENT HEALTH QUESTIONNAIRE - PHQ9
CLINICAL INTERPRETATION OF PHQ2 SCORE: 3
5. POOR APPETITE OR OVEREATING: 2 - MORE THAN HALF THE DAYS
SUM OF ALL RESPONSES TO PHQ QUESTIONS 1-9: 16

## 2019-06-14 ASSESSMENT — PAIN SCALES - GENERAL: PAINLEVEL: 4=SLIGHT-MODERATE PAIN

## 2019-06-14 NOTE — PROGRESS NOTES
"Follow up patient note  Pain Medicine, Interventional spine and sports physiatry, Physical medicine rehabilitation      Chief complaint:   Joint pain      HISTORY    Please see new patient note dated 06/08/2018 by Dr Kerr,  for more details.     HPI  Patient identification: Mary Martinez 49 y.o. female returns for follow-up of her pain related to rheumatoid arthritis.      Interval history:  Since the last visit, I have now performed a left C3, C4, TON diagnostic and therapeutic block on 05/29/2019.  She reports that her headaches are gone.  Her left shoulder ROM has improved somewhat.    Since the right C3, C4, TON diagnostic and therapeutic blocks on 05/01/2019, she has had significant improvement on the right-sided neck pain.  Her headaches are still better.  She is even able to lift her right arm up over her head to do her hair.     The right shoulder started becoming very painful around the 05/30/2019.  This was very severe pain in the right shoulder.  She reports that this was more painful and then did go away, eventually.  Symptoms were just in the shoulder and she almost went to the ER.  Her blood pressure was elevated, around 160/90, she says.  Without any particular intervention, this got better.  She plans to see Dr. Valdovinos in July for new xrays and MRI.  She was told that she needs a shoulder replacement previously.    She reports some burning pain in her upper extremities seems to be intermittent.  She continues to take gabapentin 800mg po qid.  No side effects.  Some cracking in her neck still.  This is not painful.    Percocet 5/325 q6h helps keep her symptoms under control.  Regular bowel movements.  She is not taking OTC medications.    Discussed smoking cessation.  She was given chantix, but had mood changes (anger) and did not tolerate this.  Encouraged her to keep working on this goal.  She is still working on this.    She also reports today that she has had some \"green pus\" that she " expressed through the residual digit on the right hand.  She reports that she bumps this regularly, but did not do anything that she knows.  No fevers or chills.    ROS  Unchanged from previous visit 4/19/2019 except as noted in the HPI  Red Flags :   Fever, Chills, Sweats: Denies  Involuntary Weight Loss: Denies  Bowel/Bladder Incontinence: Denies      PMHx:   Past Medical History:   Diagnosis Date   • ASTHMA     inhalers prn   • Dental disorder     upper partial   • Pain     everywhere   • Psychiatric problem     anxiety   • Rheumatoid arthritis(714.0) 2003       PSHx:   Past Surgical History:   Procedure Laterality Date   • FINGER ARTHROPLASTY Right 6/5/2017    Procedure: FINGER ARTHROPLASTY - LONG, RING AND SMALL VOLAR PLATE;  Surgeon: Lobo Rosen M.D.;  Location: SURGERY SAME DAY [facility];  Service:    • FINGER AMPUTATION  6/5/2017    Procedure: FINGER AMPUTATION - LONG, RING AND SMALL AT THE PROXIMAL INTERPHALANGEAL JOINT;  Surgeon: Lobo Rosen M.D.;  Location: SURGERY SAME DAY [facility];  Service:    • FINGER ARTHROPLASTY Right 4/17/2017    Procedure: FINGER ARTHROPLASTY ;  Surgeon: Lobo Rosen M.D.;  Location: SURGERY SAME DAY [facility];  Service:    • FINGER AMPUTATION Right 9/14/2016    Procedure: FINGER AMPUTATION INDEX;  Surgeon: Lobo Rosen M.D.;  Location: SURGERY SAME DAY [facility];  Service:    • IRRIGATION & DEBRIDEMENT ORTHO Right 9/11/2016    Procedure: IRRIGATION & DEBRIDEMENT ORTHO - right index finger;  Surgeon: Madhu Rosen M.D.;  Location: SURGERY [facility];  Service:    • PIP ARTHRODESIS Right 8/29/2016    Procedure: RE-DO INDEX FINGER PROXIMAL INTERPHALANGEAL ARTHRODESIS;  Surgeon: Lobo Rosen M.D.;  Location: SURGERY SAME DAY [facility];  Service:    • BONE GRAFT Right 8/29/2016    Procedure: BONE GRAFT - DISTAL RADIUS ;  Surgeon: Lobo Rosen M.D.;  Location: SURGERY SAME DAY [facility];  Service:    •  ARTHRODESIS Right 2016    Procedure: ARTHRODESIS INDEX FINGER PROXIMAL INTERPHALANGEAL;  Surgeon: Lobo Rosen M.D.;  Location: SURGERY SAME DAY [facility];  Service:    • FOOT RECONSTRUCTION RHEUMATIC Left 2015    Procedure: FOOT RECONSTRUCTION RHEUMATIC;  Surgeon: Heriberto Alves M.D.;  Location: SURGERY [facility];  Service:    • TOE FUSION Right 2015    Procedure: TOE FUSION 1ST METATARSALPHALANGEAL JOINT;  Surgeon: Heriberto Alves M.D.;  Location: SURGERY [facility];  Service:    • BONE SPUR EXCISION  2015    Procedure: BONE SPUR EXCISION METATARSAL HEAD 2-3;  Surgeon: Heriberto Alves M.D.;  Location: SURGERY [facility];  Service:    • HAMMERTOE CORRECTION  2015    Procedure: HAMMERTOE CORRECTION AND SOFT TISSUE RE-ALINGMENT 2-3 ;  Surgeon: Heriberto Alves M.D.;  Location: SURGERY [facility];  Service:    • EMANUEL BY LAPAROSCOPY  2011    Performed by VERO WRIGHT at [facility]   • ABDOMINAL ABSCESS DRAINAGE  2011    Performed by VERO WRIGHT at [facility]   • OTHER  1982    tonsils   • APPENDECTOMY     • CHOLECYSTECTOMY     • GYN SURGERY      C section X 4   • OTHER ABDOMINAL SURGERY      small colon block   • PB  DELIVERY ONLY      x 4   • TONSILLECTOMY     • WRIST ORIF         Family history   History reviewed. No pertinent family history.      Medications:   Outpatient Prescriptions Marked as Taking for the 19 encounter (Office Visit) with Jayy Kerr M.D.   Medication Sig Dispense Refill   • gabapentin (NEURONTIN) 800 MG tablet Take 1 Tab by mouth 4 times a day for 90 days. 360 Tab 0   • [START ON 2019] oxyCODONE-acetaminophen (PERCOCET) 5-325 MG Tab Take 1 Tab by mouth every 6 hours as needed for Severe Pain for up to 30 days. 120 Tab 0   • Cyanocobalamin (VITAMIN B-12) 1000 MCG Tab TAKE ONE TABLET BY MOUTH EVERY DAY FOR VITAMIN B12 SUPPLEMENTATION. TAKE WITH FOLIC ACID.  3   • folic  "acid (FOLVITE) 400 MCG tablet TAKE ONE TABLET BY MOUTH EVERY DAY FOR FOLIC ACID SUPPLEMENTATION. TAKE WITH VITAMIN B12  3   • dexamethasone pf (DECADRON) 10 MG/ML Solution      • alendronate (FOSAMAX) 70 MG Tab      • pantoprazole (PROTONIX) 40 MG Tablet Delayed Response      • paroxetine (PAXIL) 40 MG tablet      • Etanercept (ENBREL) 50 MG/ML Solution Prefilled Syringe Inject  as instructed every 7 days.     • albuterol (VENTOLIN OR PROVENTIL) 108 (90 BASE) MCG/ACT AERS inhalation aerosol Inhale 2 Puffs by mouth as needed for Shortness of Breath.         Allergies:   Allergies   Allergen Reactions   • Nitrous Oxide Vomiting   • Penicillins Hives     Tolerates cephalosporins       Social Hx:   Social History     Social History   • Marital status:      Spouse name: N/A   • Number of children: N/A   • Years of education: N/A     Occupational History   • Not on file.     Social History Main Topics   • Smoking status: Current Every Day Smoker     Packs/day: 1.00     Years: 30.00     Types: Cigarettes   • Smokeless tobacco: Never Used      Comment: 1 ppd   • Alcohol use No   • Drug use: No   • Sexual activity: Not on file     Other Topics Concern   •  Service No   • Blood Transfusions Yes   • Caffeine Concern No   • Occupational Exposure No   • Hobby Hazards No   • Sleep Concern No   • Stress Concern Yes   • Weight Concern No   • Special Diet No   • Back Care No   • Exercise Yes   • Bike Helmet Yes   • Seat Belt Yes   • Self-Exams Yes     Social History Narrative    ** Merged History Encounter **              EXAMINATION     Physical Exam:   Vitals: /78 (BP Location: Left arm, Patient Position: Sitting, BP Cuff Size: Adult long)   Pulse 92   Temp 36.7 °C (98 °F) (Temporal)   Ht 1.6 m (5' 3\")   Wt 59.1 kg (130 lb 4.7 oz)   SpO2 95%     Constitutional:   Body Habitus: Body mass index is 23.08 kg/m².  Cooperation: Fully cooperates with exam  Appearance: Well-groomed no disheveled, in no acute " distress  Respiratory-  breathing comfortable on room air, no audible wheezing  Cardiovascular-  No lower extremity edema is noted.   Psychiatric- alert and oriented ×3. Normal affect.   Musculoskeletal:  Cervical spine:  No occipital tenderness.  No tenderness over the right or left cervical paraspinals.  Full ROM on the right shoulder and decreased ROM of the shoulder on the left.     Reflexes 2+ biceps, triceps bilaterally    Partial hand amputation on the right at the MCPs; ulnar deviation on the left with MCP subluxation, mild atrophy of the hand intrinsics with wasting of the thenar eminence    Right fourth digit amputation without erythema or warmth.  Small healed area distally that patient reports had expressed green pus last week    Mild tenderness over the left wrist, no warmth or erythema.  Negative compression test.      MEDICAL DECISION MAKING    DATA    Labs: UDS 10/30/2018 consistent with medications.  Oxycodone and metabolites without other substances   UDS 04/19/2019 consistent with medications. Oxycodone and metabolites without other substances        Imaging:  No new imaging  MRI cervical spine 07/31/2018:  Films reviewed.  These are my reads:  There is is note of motion at the atlantodental level with 5mm atlantodental inverval in flexion that reduces to 1-2 mm in extension.  Mild retrolisthesis of C4 on C5 and anterolisthesis of C5- on C6.  Disc extrusion at C6-7 with mild central canal stenosis    Xray left shoulder 2/5/2018 Humeral head is elevated.  Glenohumeral joint arthritis.    Reports reviewed:  Xray cervical spine 11/14/2018  1. No acute fracture  2. Persistent motion at the C1-2 joint as before, suggesting atlantoaxial instability  3. Minimal instability noted at C5-6 level as described.    MRI cervical spine 07/31/2018  1. Widening of the atlantodental interal in neutral and flexion positions with a 5mm space which reduces to 1-2 mm in extension  2. Degenerative changes with the disc  extrusion at C6-7 level causing mild canal stenosis    Xray cervical spine 07/06/2018: Atlantoaxial instability at C1-2     Xrays knees 07/06/2018  Left: Unremarkable  Right: Unremarkable             DIAGNOSIS   Visit Diagnoses     ICD-10-CM   1. Shoulder arthritis M19.019   2. Cervicogenic headache R51   3. Other osteoarthritis of spine, cervical region M47.892   4. 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 Rheumatoid arthritis involving multiple sites with positive rheumatoid factor ([facility]) M05.79   6. Amputation of right hand, sequela ([facility]) S68.411S   7. Hand deformity, acquired, left M21.942   8. Paresthesias R20.2         ASSESSMENT and PLAN:     Mary Martinez 49 y.o. female with rheumatoid arthritis    Mary was seen today for follow-up.    Diagnoses and all orders for this visit:    Osteoarthritis of the cervical spine  Cervicogenic headaches    Continue gabapentin 800mg po QID. This was refilled for 90 days on 04/09/2019.  Refill given today.    Headaches have improved since C3, C4, TON injections.    Concerned for possible infection in the right index finger amputation.  She has not been to the ED/UC for this.  Contact UC for visit.  Anticipate need for cultures prior to possible antibiotic therapy.  Urgent referral to hand surgery placed as she cannot be seen by her previous surgeon as he does not take her insurance.    Refill percocet for future fill on 06/27/2019, done previously.    Continue to follow-up with Dr. Valdovinos to discuss surgical options regarding the shoulder.    Plan to continue percocet for chronic pain.  Continue to follow-up with Rheumatology for management of other medications related to rheumatoid arthritis.    Encouraged her to continue with efforts to quit smoking.  She is down to 1/2 pack a day from 1 pack per day.    In prescribing controlled substances to this patient, I certify that I have obtained and reviewed the medical history of Mary Martinez. I have also made a  good aristides effort to obtain applicable records from other providers who have treated the patient and records did not demonstrate any increased risk of substance abuse that would prevent me from prescribing controlled substances.     I have conducted a physical exam and documented it. I have reviewed Ms. Martinez’s prescription history as maintained by the [place] Prescription Monitoring Program.   ORT 4, indicates moderate risk    I have assessed the patient’s risk for abuse, dependency, and addiction using the validated Opioid Risk Tool available at https://www.mdcalc.com/kkdkwn-mxuh-kibm-ort-narcotic-abuse.     Given the above, I believe the benefits of controlled substance therapy outweigh the risks. The reasons for prescribing controlled substances include non-narcotic, oral analgesic alternatives have been inadequate for pain control and in my professional opinion, controlled substances are the only reasonable choice for this patient because her pain was note adequately controlled with alternatives and she has chronic progressive disease. Accordingly, I have discussed the risk and benefits, treatment plan, and alternative therapies with the patient.         Follow up: For follow-up      Please note that this dictation was created using voice recognition software. I have made every reasonable attempt to correct obvious errors but there may be errors of grammar and content that I may have overlooked prior to finalization of this note.      Jayy Kerr MD  Interventional Spine and Sports Physiatry  Physical Medicine and Rehabilitation  [facility]

## 2019-06-14 NOTE — PROGRESS NOTES
Subjective:      Mary Martinez is a 49 y.o. female who presents with Hand Problem (x1 week (R) nub infection)            Hand Injury   This is a new problem. The current episode started in the past 7 days. The problem occurs daily. The problem has been waxing and waning. Associated symptoms include a fever. Pertinent negatives include no abdominal pain, congestion, coughing, joint swelling, rash, sore throat or vomiting. Nothing aggravates the symptoms. Treatments tried: squeezing site. The treatment provided mild relief.   No known trauma.  Notes spontaneous purulent drainage from stump of right ring finger.  PMH significant for osteomyelitis.  Pain medicine specialist apparently referred patient to hand surgeon today.    Review of Systems   Constitutional: Positive for fever.   HENT: Negative for congestion and sore throat.    Respiratory: Negative for cough and shortness of breath.    Gastrointestinal: Negative for abdominal pain, diarrhea and vomiting.   Genitourinary: Negative for dysuria and urgency.   Musculoskeletal: Negative for joint swelling.   Skin: Negative for rash.   Neurological: Negative for sensory change and focal weakness.     PMH:  has a past medical history of ASTHMA; Dental disorder; Pain; Psychiatric problem; and Rheumatoid arthritis(714.0) (2003).  MEDS:   Current Outpatient Prescriptions:   •  gabapentin (NEURONTIN) 800 MG tablet, Take 1 Tab by mouth 4 times a day for 90 days., Disp: 360 Tab, Rfl: 0  •  [START ON 6/27/2019] oxyCODONE-acetaminophen (PERCOCET) 5-325 MG Tab, Take 1 Tab by mouth every 6 hours as needed for Severe Pain for up to 30 days., Disp: 120 Tab, Rfl: 0  •  Cyanocobalamin (VITAMIN B-12) 1000 MCG Tab, TAKE ONE TABLET BY MOUTH EVERY DAY FOR VITAMIN B12 SUPPLEMENTATION. TAKE WITH FOLIC ACID., Disp: , Rfl: 3  •  folic acid (FOLVITE) 400 MCG tablet, TAKE ONE TABLET BY MOUTH EVERY DAY FOR FOLIC ACID SUPPLEMENTATION. TAKE WITH VITAMIN B12, Disp: , Rfl: 3  •  alendronate  (FOSAMAX) 70 MG Tab, , Disp: , Rfl:   •  pantoprazole (PROTONIX) 40 MG Tablet Delayed Response, , Disp: , Rfl:   •  paroxetine (PAXIL) 40 MG tablet, , Disp: , Rfl:   •  Etanercept (ENBREL) 50 MG/ML Solution Prefilled Syringe, Inject  as instructed every 7 days., Disp: , Rfl:   •  albuterol (VENTOLIN OR PROVENTIL) 108 (90 BASE) MCG/ACT AERS inhalation aerosol, Inhale 2 Puffs by mouth as needed for Shortness of Breath., Disp: , Rfl:   •  dexamethasone pf (DECADRON) 10 MG/ML Solution, , Disp: , Rfl:   •  montelukast (SINGULAIR) 10 MG Tab, TAKE 1 TABLET BY MOUTH DAILY FOR ALLERGIES, Disp: , Rfl: 1  •  AYR SALINE NASAL NO-DRIP Gel, USE 1 SPRAY IN EACH NOSTRIL 2 3 TIMES DAILY, Disp: , Rfl: 0  •  benzonatate (TESSALON) 100 MG Cap, TAKE 1 CAPSULE BY MOUTH UP TO 3X DAILY FOR COUGH, Disp: , Rfl: 2  •  busPIRone (BUSPAR) 15 MG tablet, , Disp: , Rfl:   ALLERGIES:   Allergies   Allergen Reactions   • Nitrous Oxide Vomiting   • Penicillins Hives     Tolerates cephalosporins     SURGHX:   Past Surgical History:   Procedure Laterality Date   • FINGER ARTHROPLASTY Right 6/5/2017    Procedure: FINGER ARTHROPLASTY - LONG, RING AND SMALL VOLAR PLATE;  Surgeon: Lobo Rosen M.D.;  Location: SURGERY SAME DAY [facility];  Service:    • FINGER AMPUTATION  6/5/2017    Procedure: FINGER AMPUTATION - LONG, RING AND SMALL AT THE PROXIMAL INTERPHALANGEAL JOINT;  Surgeon: Lobo Rosen M.D.;  Location: SURGERY SAME DAY [facility];  Service:    • FINGER ARTHROPLASTY Right 4/17/2017    Procedure: FINGER ARTHROPLASTY ;  Surgeon: Lobo Rosen M.D.;  Location: SURGERY SAME DAY [facility];  Service:    • FINGER AMPUTATION Right 9/14/2016    Procedure: FINGER AMPUTATION INDEX;  Surgeon: Lobo Rosen M.D.;  Location: SURGERY SAME DAY [facility];  Service:    • IRRIGATION & DEBRIDEMENT ORTHO Right 9/11/2016    Procedure: IRRIGATION & DEBRIDEMENT ORTHO - right index finger;  Surgeon: Madhu Rosen M.D.;   Location: SURGERY [facility];  Service:    • PIP ARTHRODESIS Right 2016    Procedure: RE-DO INDEX FINGER PROXIMAL INTERPHALANGEAL ARTHRODESIS;  Surgeon: Lobo Rosen M.D.;  Location: SURGERY SAME DAY [facility];  Service:    • BONE GRAFT Right 2016    Procedure: BONE GRAFT - DISTAL RADIUS ;  Surgeon: Lobo Rosen M.D.;  Location: SURGERY SAME DAY [facility];  Service:    • ARTHRODESIS Right 2016    Procedure: ARTHRODESIS INDEX FINGER PROXIMAL INTERPHALANGEAL;  Surgeon: Lobo Rosen M.D.;  Location: SURGERY SAME DAY [facility];  Service:    • FOOT RECONSTRUCTION RHEUMATIC Left 2015    Procedure: FOOT RECONSTRUCTION RHEUMATIC;  Surgeon: Heriberto Alves M.D.;  Location: SURGERY [facility];  Service:    • TOE FUSION Right 2015    Procedure: TOE FUSION 1ST METATARSALPHALANGEAL JOINT;  Surgeon: Heriberto Alves M.D.;  Location: SURGERY [facility];  Service:    • BONE SPUR EXCISION  2015    Procedure: BONE SPUR EXCISION METATARSAL HEAD 2-3;  Surgeon: Heriberto Alves M.D.;  Location: SURGERY [facility];  Service:    • HAMMERTOE CORRECTION  2015    Procedure: HAMMERTOE CORRECTION AND SOFT TISSUE RE-ALINGMENT 2-3 ;  Surgeon: Heriberto Alves M.D.;  Location: SURGERY [facility];  Service:    • EMANUEL BY LAPAROSCOPY  2011    Performed by VERO WRIGHT at [facility]   • ABDOMINAL ABSCESS DRAINAGE  2011    Performed by VERO WRIGHT at [facility]   • OTHER  1982    tonsils   • APPENDECTOMY     • CHOLECYSTECTOMY     • GYN SURGERY      C section X 4   • OTHER ABDOMINAL SURGERY      small colon block   • PB  DELIVERY ONLY      x 4   • TONSILLECTOMY     • WRIST ORIF       SOCHX:  reports that she has been smoking Cigarettes.  She has a 30.00 pack-year smoking history. She has never used smokeless tobacco. She reports that she does not drink alcohol or use drugs.  FH: family history is not on  "file.       Objective:     /82 (BP Location: Left arm)   Pulse 95   Temp 36.7 °C (98.1 °F) (Temporal)   Resp 18   Ht 1.6 m (5' 3\")   Wt 59.4 kg (131 lb)   LMP 09/21/2011   SpO2 97%   BMI 23.21 kg/m²      Physical Exam   Constitutional: Vital signs are normal. She appears well-developed and well-nourished. She is cooperative. She does not have a sickly appearance. She does not appear ill. No distress.   Cardiovascular:   Distal capillary refill intact.   Pulmonary/Chest: Effort normal.   Musculoskeletal:        Right hand: She exhibits deformity. She exhibits no tenderness and no swelling.        Hands:  Lymphadenopathy:   No lymphangitis proximally.   Neurological: She is alert.   Distal sensation to touch and pressure intact.   Skin: Skin is warm and dry.                    Assessment/Plan:     1. Lesion of finger  - DX-FINGER(S) 2+ RIGHT; per radiologist:  The second finger is amputated at the level of the distal second metacarpal, the third, fourth, and fifth fingers are amputated at the levels of the proximal phalanges.  There is soft tissue swelling about the third finger stump.  There is periosteal reaction and poor cortical bone definition of the proximal phalanx remnant. A tiny soft tissue calcification is also identified.  There appears to be cortical thickening old periosteal reaction involving the metacarpals.  There is severe erosive arthritic changes involving the third through fifth MCP joints.  The first MCP joint appears fused.  Marked arthritic changes wrist. Marked erosions distal radial ulnar articulation.  Rx Cleocin  Continue plan F/U Hand Surgery      "

## 2019-06-19 ENCOUNTER — TELEPHONE (OUTPATIENT)
Dept: PHYSICAL MEDICINE AND REHAB | Facility: MEDICAL CENTER | Age: 49
End: 2019-06-19

## 2019-06-20 ENCOUNTER — TELEPHONE (OUTPATIENT)
Dept: PHYSICAL MEDICINE AND REHAB | Facility: MEDICAL CENTER | Age: 49
End: 2019-06-20

## 2019-06-20 NOTE — TELEPHONE ENCOUNTER
I called [facility] office and spoke with Mikayla, patient have an appointment with Dr Melendez on 6/26/2019 and they not need any extra information from us right now, I called patient and informed.

## 2019-06-20 NOTE — TELEPHONE ENCOUNTER
Okay, so it sounds like they do not need extra information.  Can you make sure that my last note gets sent over to them?  Thank you.

## 2019-06-20 NOTE — TELEPHONE ENCOUNTER
Patient's son Lobo called and leave a message to inform us that they have some issues with the referral you send to [facility] I called  their office and  was close I will them tomorrow to follow also I called patient and informed her.

## 2019-06-27 ENCOUNTER — TELEPHONE (OUTPATIENT)
Dept: PHYSICAL MEDICINE AND REHAB | Facility: MEDICAL CENTER | Age: 49
End: 2019-06-27

## 2019-07-09 ENCOUNTER — HOSPITAL ENCOUNTER (OUTPATIENT)
Dept: RADIOLOGY | Facility: MEDICAL CENTER | Age: 49
End: 2019-07-09

## 2019-07-19 ENCOUNTER — OFFICE VISIT (OUTPATIENT)
Dept: PHYSICAL MEDICINE AND REHAB | Facility: MEDICAL CENTER | Age: 49
End: 2019-07-19
Payer: MEDICAID

## 2019-07-19 VITALS
DIASTOLIC BLOOD PRESSURE: 82 MMHG | TEMPERATURE: 98 F | HEART RATE: 77 BPM | OXYGEN SATURATION: 96 % | BODY MASS INDEX: 22.66 KG/M2 | WEIGHT: 127.87 LBS | SYSTOLIC BLOOD PRESSURE: 130 MMHG | HEIGHT: 63 IN

## 2019-07-19 DIAGNOSIS — M21.942 HAND DEFORMITY, ACQUIRED, LEFT: ICD-10-CM

## 2019-07-19 DIAGNOSIS — F11.90 CHRONIC, CONTINUOUS USE OF OPIOIDS: ICD-10-CM

## 2019-07-19 DIAGNOSIS — M79.2 NEUROPATHIC PAIN: ICD-10-CM

## 2019-07-19 DIAGNOSIS — S68.411S AMPUTATION OF RIGHT HAND, SEQUELA (HCC): ICD-10-CM

## 2019-07-19 DIAGNOSIS — M53.2X1 ATLANTOAXIAL INSTABILITY: ICD-10-CM

## 2019-07-19 DIAGNOSIS — M19.019 SHOULDER ARTHRITIS: ICD-10-CM

## 2019-07-19 DIAGNOSIS — Z78.9 IMPAIRED MOBILITY AND ADLS: ICD-10-CM

## 2019-07-19 DIAGNOSIS — R20.2 PARESTHESIAS: ICD-10-CM

## 2019-07-19 DIAGNOSIS — M05.79 RHEUMATOID ARTHRITIS INVOLVING MULTIPLE SITES WITH POSITIVE RHEUMATOID FACTOR (HCC): ICD-10-CM

## 2019-07-19 DIAGNOSIS — Z74.09 IMPAIRED MOBILITY AND ADLS: ICD-10-CM

## 2019-07-19 PROCEDURE — 99214 OFFICE O/P EST MOD 30 MIN: CPT | Performed by: PHYSICAL MEDICINE & REHABILITATION

## 2019-07-19 RX ORDER — PAROXETINE 30 MG/1
60 TABLET, FILM COATED ORAL EVERY EVENING
COMMUNITY
Start: 2019-06-25 | End: 2023-01-19

## 2019-07-19 RX ORDER — PREGABALIN 50 MG/1
50 CAPSULE ORAL 3 TIMES DAILY
Qty: 90 CAP | Refills: 0 | Status: SHIPPED | OUTPATIENT
Start: 2019-07-19 | End: 2019-08-16

## 2019-07-19 RX ORDER — OXYCODONE HYDROCHLORIDE AND ACETAMINOPHEN 5; 325 MG/1; MG/1
1 TABLET ORAL EVERY 6 HOURS PRN
Qty: 120 TAB | Refills: 0 | Status: SHIPPED | OUTPATIENT
Start: 2019-07-26 | End: 2019-08-16 | Stop reason: SDUPTHER

## 2019-07-19 RX ORDER — SIMVASTATIN 10 MG
TABLET ORAL
COMMUNITY
Start: 2019-06-25 | End: 2019-08-16

## 2019-07-19 RX ORDER — QUETIAPINE FUMARATE 100 MG/1
100 TABLET, FILM COATED ORAL EVERY EVENING
COMMUNITY
End: 2023-01-19

## 2019-07-19 ASSESSMENT — PAIN SCALES - GENERAL: PAINLEVEL: 8=MODERATE-SEVERE PAIN

## 2019-07-19 ASSESSMENT — PATIENT HEALTH QUESTIONNAIRE - PHQ9
SUM OF ALL RESPONSES TO PHQ QUESTIONS 1-9: 27
5. POOR APPETITE OR OVEREATING: 3 - NEARLY EVERY DAY
CLINICAL INTERPRETATION OF PHQ2 SCORE: 6

## 2019-07-19 NOTE — PROGRESS NOTES
"Follow up patient note  Pain Medicine, Interventional spine and sports physiatry, Physical medicine rehabilitation      Chief complaint:   Joint pain      HISTORY    Please see new patient note dated 06/08/2018 by Dr Kerr,  for more details.     [facility]  Patient identification: Mary Martinez 49 y.o. female returns for follow-up of her pain related to rheumatoid arthritis.      Interval history:  Mary presents for follow-up of her chronic pain complaints related to rheumatoid arthritis.  She has been under a lot of stress and has been hospitalized twice for suicidal ideation in the last month.  Denies current suicidality and has follow-up with behavioral health next week.    She has been having more trouble with her self-care, she has been having trouble with washing her hair due to pain.  This is a loss of function compared with previous visit and after injections.    Neck pain has increased.  She has radicular pain that is shooting and burning.  This is relatively constant.  She continues to take gabapentin 800mg po qid.  No side effects.     Seroquel has been helping her with sleep.    Her neck continues to crack.  This is nonpainful.     Percocet 5/325 q6h helps keep her symptoms under control.  Regular bowel movements.  She is not taking OTC medications.    Discussed smoking cessation, but she is not prioritizing this.    She also reports today that she has had some \"green pus\" that she expressed through the residual digit on the right hand.  She has not been able to follow-up with Ortho.  This has improved and she completed her antibiotics    PHQ-9: 27  ORT: 4    ROS  Unchanged from previous visit 6/14/2019 except as noted in the HPI  Red Flags :   Fever, Chills, Sweats: Denies  Involuntary Weight Loss: Denies  Bowel/Bladder Incontinence: Denies      PMHx:   Past Medical History:   Diagnosis Date   • ASTHMA     inhalers prn   • Dental disorder     upper partial   • Pain     everywhere   • Psychiatric " problem     anxiety   • Rheumatoid arthritis(714.0) 2003       PSHx:   Past Surgical History:   Procedure Laterality Date   • FINGER ARTHROPLASTY Right 6/5/2017    Procedure: FINGER ARTHROPLASTY - LONG, RING AND SMALL VOLAR PLATE;  Surgeon: Lobo Rosen M.D.;  Location: SURGERY SAME DAY [facility];  Service:    • FINGER AMPUTATION  6/5/2017    Procedure: FINGER AMPUTATION - LONG, RING AND SMALL AT THE PROXIMAL INTERPHALANGEAL JOINT;  Surgeon: Lobo Rosen M.D.;  Location: SURGERY SAME DAY [facility];  Service:    • FINGER ARTHROPLASTY Right 4/17/2017    Procedure: FINGER ARTHROPLASTY ;  Surgeon: Lobo Rosen M.D.;  Location: SURGERY SAME DAY [facility];  Service:    • FINGER AMPUTATION Right 9/14/2016    Procedure: FINGER AMPUTATION INDEX;  Surgeon: Lobo Rosen M.D.;  Location: SURGERY SAME DAY [facility];  Service:    • IRRIGATION & DEBRIDEMENT ORTHO Right 9/11/2016    Procedure: IRRIGATION & DEBRIDEMENT ORTHO - right index finger;  Surgeon: Madhu Rosen M.D.;  Location: [facility];  Service:    • PIP ARTHRODESIS Right 8/29/2016    Procedure: RE-DO INDEX FINGER PROXIMAL INTERPHALANGEAL ARTHRODESIS;  Surgeon: Lobo Rosen M.D.;  Location: SURGERY SAME DAY [facility];  Service:    • BONE GRAFT Right 8/29/2016    Procedure: BONE GRAFT - DISTAL RADIUS ;  Surgeon: Lobo Rosen M.D.;  Location: SURGERY SAME DAY [facility];  Service:    • ARTHRODESIS Right 5/6/2016    Procedure: ARTHRODESIS INDEX FINGER PROXIMAL INTERPHALANGEAL;  Surgeon: Lobo Rosen M.D.;  Location: SURGERY SAME DAY [facility];  Service:    • FOOT RECONSTRUCTION RHEUMATIC Left 7/29/2015    Procedure: FOOT RECONSTRUCTION RHEUMATIC;  Surgeon: Heriberto Alves M.D.;  Location: [facility];  Service:    • TOE FUSION Right 5/27/2015    Procedure: TOE FUSION 1ST METATARSALPHALANGEAL JOINT;  Surgeon: Heriberto Alves M.D.;  Location: [facility]  ORS;  Service:    • BONE SPUR EXCISION  2015    Procedure: BONE SPUR EXCISION METATARSAL HEAD 2-3;  Surgeon: Heriberto Alves M.D.;  Location: SURGERY [facility];  Service:    • HAMMERTOE CORRECTION  2015    Procedure: HAMMERTOE CORRECTION AND SOFT TISSUE RE-ALINGMENT 2-3 ;  Surgeon: Heriberto Alves M.D.;  Location: SURGERY [facility];  Service:    • EMANUEL BY LAPAROSCOPY  2011    Performed by VERO WRIGHT at SURGERY [facility] ORS   • ABDOMINAL ABSCESS DRAINAGE  2011    Performed by VERO WRIGHT at SURGERY [facility] ORS   • OTHER  1982    tonsils   • APPENDECTOMY     • CHOLECYSTECTOMY     • GYN SURGERY      C section X 4   • OTHER ABDOMINAL SURGERY      small colon block   • PB  DELIVERY ONLY      x 4   • TONSILLECTOMY     • WRIST ORIF         Family history   History reviewed. No pertinent family history.      Medications:   Outpatient Prescriptions Marked as Taking for the 19 encounter (Office Visit) with Jayy Kerr M.D.   Medication Sig Dispense Refill   • QUEtiapine (SEROQUEL) 100 MG Tab Take 100 mg by mouth 2 times a day.     • [START ON 2019] oxyCODONE-acetaminophen (PERCOCET) 5-325 MG Tab Take 1 Tab by mouth every 6 hours as needed for Severe Pain for up to 30 days. 120 Tab 0   • pregabalin (LYRICA) 50 MG capsule Take 1 Cap by mouth 3 times a day for 30 days. 90 Cap 0   • gabapentin (NEURONTIN) 800 MG tablet Take 1 Tab by mouth 4 times a day for 90 days. 360 Tab 0   • AYR SALINE NASAL NO-DRIP Gel USE 1 SPRAY IN EACH NOSTRIL 2 3 TIMES DAILY  0   • alendronate (FOSAMAX) 70 MG Tab      • pantoprazole (PROTONIX) 40 MG Tablet Delayed Response      • Etanercept (ENBREL) 50 MG/ML Solution Prefilled Syringe Inject  as instructed every 7 days.         Allergies:   Allergies   Allergen Reactions   • Aripiprazole [Abilify]    • Nitrous Oxide Vomiting   • Penicillins Hives     Tolerates cephalosporins       Social Hx:   Social History     Social History   •  "Marital status:      Spouse name: N/A   • Number of children: N/A   • Years of education: N/A     Occupational History   • Not on file.     Social History Main Topics   • Smoking status: Current Every Day Smoker     Packs/day: 1.00     Years: 30.00     Types: Cigarettes   • Smokeless tobacco: Never Used      Comment: 1 ppd   • Alcohol use No   • Drug use: No   • Sexual activity: Not on file     Other Topics Concern   •  Service No   • Blood Transfusions Yes   • Caffeine Concern No   • Occupational Exposure No   • Hobby Hazards No   • Sleep Concern No   • Stress Concern Yes   • Weight Concern No   • Special Diet No   • Back Care No   • Exercise Yes   • Bike Helmet Yes   • Seat Belt Yes   • Self-Exams Yes     Social History Narrative    ** Merged History Encounter **              EXAMINATION     Physical Exam:   Vitals: /82 (BP Location: Right arm, Patient Position: Sitting, BP Cuff Size: Adult long)   Pulse 77   Temp 36.7 °C (98 °F) (Temporal)   Ht 1.6 m (5' 3\")   Wt 58 kg (127 lb 13.9 oz)   SpO2 96%     Constitutional:   Body Habitus: Body mass index is 22.65 kg/m².  Cooperation: Fully cooperates with exam  Appearance: Well-groomed no disheveled, in no acute distress  Respiratory-  breathing comfortable on room air, no audible wheezing  Cardiovascular-  No lower extremity edema is noted.   Psychiatric- alert and oriented ×3. Normal affect.   Musculoskeletal:  Cervical spine:  No occipital tenderness.  No tenderness over the right or left cervical paraspinals.  Today, she has about 90 degrees of abduction in the shoulder bilaterally with pain.  Tinel's is positive over the left wrist and negative on the right.     Reflexes 2+ biceps, triceps bilaterally    Partial hand amputation on the right at the MCPs; ulnar deviation on the left with MCP subluxation, mild atrophy of the hand intrinsics with wasting of the thenar eminence    Right fourth digit amputation without erythema or warmth.  Small " healed area distally that patient reports had expressed green pus last week    Mild tenderness over the left wrist, no warmth or erythema.  Negative compression test.      MEDICAL DECISION MAKING    DATA    Labs: UDS 10/30/2018 consistent with medications.  Oxycodone and metabolites without other substances   UDS 04/19/2019 consistent with medications. Oxycodone and metabolites without other substances        Imaging:  No new imaging  MRI cervical spine 07/31/2018:  Films reviewed.  These are my reads:  There is is note of motion at the atlantodental level with 5mm atlantodental inverval in flexion that reduces to 1-2 mm in extension.  Mild retrolisthesis of C4 on C5 and anterolisthesis of C5- on C6.  Disc extrusion at C6-7 with mild central canal stenosis    Xray left shoulder 2/5/2018 Humeral head is elevated.  Glenohumeral joint arthritis.    Reports reviewed:  Xray cervical spine 11/14/2018  1. No acute fracture  2. Persistent motion at the C1-2 joint as before, suggesting atlantoaxial instability  3. Minimal instability noted at C5-6 level as described.    MRI cervical spine 07/31/2018  1. Widening of the atlantodental interal in neutral and flexion positions with a 5mm space which reduces to 1-2 mm in extension  2. 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 Rheumatoid arthritis involving multiple sites with positive rheumatoid factor (HCC) M05.79   2. Paresthesias R20.2   3. Neuropathic pain M79.2   4. 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 Impaired mobility and ADLs Z74.09   6. Hand deformity, acquired, left M21.942   7. Amputation of right hand, sequela ([facility]) S68.411S   8. Shoulder arthritis M19.019   9. Chronic, continuous use of opioids F11.90         ASSESSMENT and PLAN:      Mary Martinez 49 y.o. female with rheumatoid arthritis    Mary was seen today for follow-up.    Diagnoses and all orders for this visit:    Osteoarthritis of the cervical spine  Cervicogenic headaches    Continue gabapentin 800mg po QID. This was refilled for 90 days on 06/14/2019.  Discussed trial of lyrica, titrate gradually.  Discussed that she has increased neuropathic pain.  Side effects discussed, particularly as it relates to mood.  Advised that she contact me and/or behavioral health for changes.  Hold percocet dose for now.    Concerned for possible infection in the right index finger amputation, this has improved.  She will followup with Ortho when able.    Refill percocet for future fill on 06/27/2019, done previously.    Continue to follow-up with Dr. Valdovinos.    Plan to continue percocet for chronic pain.  Continue to follow-up with Rheumatology for management of other medications related to rheumatoid arthritis.    Encouraged her to continue with efforts to quit smoking, she is not prioritizing this now.    In prescribing controlled substances to this patient, I certify that I have obtained and reviewed the medical history of Mary Martinez. I have also made a good aristides effort to obtain applicable records from other providers who have treated the patient and records did not demonstrate any increased risk of substance abuse that would prevent me from prescribing controlled substances.     I have conducted a physical exam and documented it. I have reviewed Ms. Martinez’s prescription history as maintained by the [place] Prescription Monitoring Program.   ORT 4, indicates moderate risk    I have assessed the patient’s risk for abuse, dependency, and addiction using the validated Opioid Risk Tool available at https://www.mdcalc.com/qhtkvq-fpfa-sjxv-ort-narcotic-abuse.     Given the above, I believe the benefits of controlled substance therapy outweigh the risks. The reasons for prescribing  controlled substances include non-narcotic, oral analgesic alternatives have been inadequate for pain control and in my professional opinion, controlled substances are the only reasonable choice for this patient because her pain was note adequately controlled with alternatives and she has chronic progressive disease. Accordingly, I have discussed the risk and benefits, treatment plan, and alternative therapies with the patient.         Follow up: 4 weeks      Please note that this dictation was created using voice recognition software. I have made every reasonable attempt to correct obvious errors but there may be errors of grammar and content that I may have overlooked prior to finalization of this note.      Jayy Kerr MD  Interventional Spine and Sports Physiatry  Physical Medicine and Rehabilitation  [facility]

## 2019-07-19 NOTE — PATIENT INSTRUCTIONS
Lyrica:  Start 50mg at bedtime for 3-5 days and then increase to 50mg twice a day for 3-5 days, then increase to 50mg po three times a day    Call with questions.  Okay to slow the titration if needed

## 2019-08-16 ENCOUNTER — OFFICE VISIT (OUTPATIENT)
Dept: PHYSICAL MEDICINE AND REHAB | Facility: MEDICAL CENTER | Age: 49
End: 2019-08-16
Payer: MEDICAID

## 2019-08-16 VITALS
SYSTOLIC BLOOD PRESSURE: 120 MMHG | BODY MASS INDEX: 23.16 KG/M2 | TEMPERATURE: 98.4 F | HEART RATE: 100 BPM | DIASTOLIC BLOOD PRESSURE: 80 MMHG | OXYGEN SATURATION: 95 % | HEIGHT: 63 IN | WEIGHT: 130.73 LBS

## 2019-08-16 DIAGNOSIS — M05.79 RHEUMATOID ARTHRITIS INVOLVING MULTIPLE SITES WITH POSITIVE RHEUMATOID FACTOR (HCC): ICD-10-CM

## 2019-08-16 DIAGNOSIS — F32.A DEPRESSION, UNSPECIFIED DEPRESSION TYPE: ICD-10-CM

## 2019-08-16 DIAGNOSIS — M21.942 HAND DEFORMITY, ACQUIRED, LEFT: ICD-10-CM

## 2019-08-16 DIAGNOSIS — S68.411S AMPUTATION OF RIGHT HAND, SEQUELA (HCC): ICD-10-CM

## 2019-08-16 DIAGNOSIS — R20.2 PARESTHESIAS: ICD-10-CM

## 2019-08-16 PROCEDURE — 99214 OFFICE O/P EST MOD 30 MIN: CPT | Performed by: PHYSICAL MEDICINE & REHABILITATION

## 2019-08-16 RX ORDER — OXYCODONE HYDROCHLORIDE AND ACETAMINOPHEN 5; 325 MG/1; MG/1
1 TABLET ORAL EVERY 6 HOURS PRN
Qty: 120 TAB | Refills: 0 | Status: SHIPPED | OUTPATIENT
Start: 2019-08-24 | End: 2019-09-12 | Stop reason: SDUPTHER

## 2019-08-16 RX ORDER — GABAPENTIN 800 MG/1
800 TABLET ORAL 4 TIMES DAILY
Qty: 360 TAB | Refills: 0 | Status: SHIPPED | OUTPATIENT
Start: 2019-09-11 | End: 2019-10-22 | Stop reason: SDUPTHER

## 2019-08-16 ASSESSMENT — PAIN SCALES - GENERAL: PAINLEVEL: 10=SEVERE PAIN
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
## 2019-08-23 ENCOUNTER — HOSPITAL ENCOUNTER (EMERGENCY)
Facility: MEDICAL CENTER | Age: 49
End: 2019-08-23
Attending: EMERGENCY MEDICINE
Payer: MEDICAID

## 2019-08-23 VITALS
HEART RATE: 82 BPM | WEIGHT: 130 LBS | SYSTOLIC BLOOD PRESSURE: 118 MMHG | HEIGHT: 63 IN | BODY MASS INDEX: 23.04 KG/M2 | RESPIRATION RATE: 16 BRPM | DIASTOLIC BLOOD PRESSURE: 87 MMHG | OXYGEN SATURATION: 94 % | TEMPERATURE: 97.9 F

## 2019-08-23 DIAGNOSIS — F33.9 EPISODE OF RECURRENT MAJOR DEPRESSIVE DISORDER, UNSPECIFIED DEPRESSION EPISODE SEVERITY (HCC): ICD-10-CM

## 2019-08-23 LAB — POC BREATHALIZER: 0 PERCENT (ref 0–0.01)

## 2019-08-23 PROCEDURE — 700102 HCHG RX REV CODE 250 W/ 637 OVERRIDE(OP): Performed by: EMERGENCY MEDICINE

## 2019-08-23 PROCEDURE — A9270 NON-COVERED ITEM OR SERVICE: HCPCS | Performed by: EMERGENCY MEDICINE

## 2019-08-23 PROCEDURE — 302970 POC BREATHALIZER: Performed by: EMERGENCY MEDICINE

## 2019-08-23 PROCEDURE — 99285 EMERGENCY DEPT VISIT HI MDM: CPT

## 2019-08-23 PROCEDURE — 90791 PSYCH DIAGNOSTIC EVALUATION: CPT

## 2019-08-23 RX ORDER — PAROXETINE 30 MG/1
30 TABLET, FILM COATED ORAL DAILY
Status: DISCONTINUED | OUTPATIENT
Start: 2019-08-23 | End: 2019-08-23 | Stop reason: HOSPADM

## 2019-08-23 RX ORDER — OMEPRAZOLE 20 MG/1
20 CAPSULE, DELAYED RELEASE ORAL DAILY
Status: DISCONTINUED | OUTPATIENT
Start: 2019-08-23 | End: 2019-08-23 | Stop reason: HOSPADM

## 2019-08-23 RX ORDER — QUETIAPINE FUMARATE 100 MG/1
100 TABLET, FILM COATED ORAL EVERY EVENING
Status: DISCONTINUED | OUTPATIENT
Start: 2019-08-23 | End: 2019-08-23 | Stop reason: HOSPADM

## 2019-08-23 RX ORDER — OXYCODONE HYDROCHLORIDE AND ACETAMINOPHEN 5; 325 MG/1; MG/1
1 TABLET ORAL EVERY 6 HOURS PRN
Status: DISCONTINUED | OUTPATIENT
Start: 2019-08-23 | End: 2019-08-23 | Stop reason: HOSPADM

## 2019-08-23 RX ORDER — GABAPENTIN 400 MG/1
800 CAPSULE ORAL 4 TIMES DAILY
Status: DISCONTINUED | OUTPATIENT
Start: 2019-08-23 | End: 2019-08-23 | Stop reason: HOSPADM

## 2019-08-23 RX ADMIN — OMEPRAZOLE 20 MG: 20 CAPSULE, DELAYED RELEASE ORAL at 15:18

## 2019-08-23 RX ADMIN — PAROXETINE 30 MG: 30 TABLET, FILM COATED ORAL at 15:18

## 2019-08-23 RX ADMIN — GABAPENTIN 800 MG: 400 CAPSULE ORAL at 15:18

## 2019-08-23 ASSESSMENT — LIFESTYLE VARIABLES: DO YOU DRINK ALCOHOL: NO

## 2019-08-23 NOTE — ED TRIAGE NOTES
"Pt chas heath from [facility]. LCSW at that facility placed pt on a legal hold for apparent impulsiveness \"various plans to end her life\". PMH of bipolar, anxiety, RA. Belongings/equpment removed from pt room. Sitter monitoring pt continuously at this time  "

## 2019-08-23 NOTE — ED NOTES
PT given meds per mar.  Tolerated well, resting at this time. siderails up x2, call light within reach.   Pt discharged home. Explained discharge instructions. Questions and comments addressed. Pt verbalized understanding of instructions. Wristband removed. Pt advised to follow-up with [facility] or return to ED for any new or worsening of symptoms. Pt is ambulating well and steady on feet. VS stable. pts son will be escorting pt home.     Pt verbalized understanding of all dc instructions.   Pt denies s/i at this time per [place] reassessment
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
## 2019-08-23 NOTE — ED PROVIDER NOTES
ED Provider Note Addendum    Scribed for Kiel Watsno DO by Darian Leung on 8/23/2019 at 3:06 PM.     This is an addendum to the note on Mary Martinez.  For further details and full chart information, see patient's initial note.       2:06 PM - I discussed the patient's case with Dr. Matthew Snow ([facility]) who will transfer care of the patient to me at this time.        3:06 PM  - Patient evaluated by myself. She denies any suicidal or homicidal ideations at this time and feels comfortable being discharged.    The patient will return for new or worsening symptoms and is stable at the time of discharge.    DISPOSITION:  Patient will be discharged home in stable condition.    FOLLOW UP:  Fidencio Christopher D.O.  [address]  [address]  [facility] [address]  [phone]    In 1 week      [facility], Emergency Dept  [address] [facility] [id number] [phone]    If symptoms worsen    Fidencio Christopher D.O.  [address]  [address]  [facility] [address]  [phone]    Schedule an appointment as soon as possible for a visit       FINAL IMPRESSION  1. Episode of recurrent major depressive disorder, unspecified depression episode severity (HCC)        Darian MOLINA (Scribe), am scribing for, and in the presence of, MYRNA Sterling.    Electronically signed by: Darian Leung (Thomas[place]), 8/23/2019    Kiel MOLINA D* personally performed the services described in this documentation, as scribed by Darian Leung in my presence, and it is both accurate and complete.    The note accurately reflects work and decisions made by me.  Kiel Watson  8/23/2019  4:02 PM

## 2019-08-23 NOTE — ED NOTES
[employer] notified of pt consult.   Pt educated on need to provide urine sample, states inability to do so at this time-- to notify RN when able
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
## 2019-08-23 NOTE — CONSULTS
"[facility]   TRIAGE ASSESSMENT    Name: Mary Martinez  MRN: [id number]  : 1970  Age: 49 y.o.  Date of assessment: 2019  PCP: Fidencio Christopher D.O.  Persons in attendance: Patient    CHIEF COMPLAINT/PRESENTING ISSUE (as stated by patient): 49 year old female BIB EMS, legal hold  SI, initiated at [facility] by outpt therapist, Adrian Hale LCSW, [phone]; pt alert, oriented x 4; calm; pleasant; cooperative; no delusions, paranoia, hallucinations present; insight, judgement intact; currently denies SI, plan, or intent; states \"I would not answer the question at my counselor's office today ifI would hurt myself or not after a huge fight with my 20 year old son, whether or not he would come home to live\"; states she had time to think and does not endorse SI; denies h/o self-harm, with h/o 1990, tried to cut wrists, no medical or MH tx; denies HI; current outpt MH providers at [facility] include therapist Adrian Hale LCSW, sees weekly, and Mckenzie Jose, psychiatry, next appt 19; psych diagnoses include Bipolar disorder and Anxiety; current psych meds include RX Paxil 60 mg PO daily and Seroquel 100 mg PO daily, taking as prescribed; with h/o inpt MH tx at [facility] 2019 and 2019 for SI; pt denies current substance use; denies h/o aggression or arrests; lives with her 3 children, ages 10,14, and 17; receives $1200/Month disability; identifies positive support systems as her 5 children, and her parents; positive coping skills include breathing, spending time with her kids        Chief Complaint   Patient presents with   • Suicidal Ideation        CURRENT LIVING SITUATION/SOCIAL SUPPORT:  lives with her 3 children, ages 10,14, and 17; identifies positive support systems as her 5 children, and her parents; current outpt MH providers at [facility] include therapist Adrian Hale LCSW, last visit today and sees weekly, and Mckenzie" Rebeca, psychiatry, next appt 8/26/19;    BEHAVIORAL HEALTH TREATMENT HISTORY  Does patient/parent report a history of prior behavioral health treatment for patient?   Yes:    Dates Level of Care Facilty/Provider Diagnosis/Problem Medications   currently outpt  [facility],  therapist at [facility], Adrian Hale, \[facility] [place] [facility]\""W Bipolar disorder, anxiety disorder Paxil 60 mg PO daily and Seroquel 100 mg PO    7/2019, 6/2019 inpt  Leon Becker  SI          SAFETY ASSESSMENT - SELF  Does patient acknowledge current or past symptoms of dangerousness to self? Yes-SI today, no plan;  h/o 1 SA, 1990, tried to cut wrists, no medical or  tx  Does parent/significant other report patient has current or past symptoms of dangerousness to self? N\A  Does presenting problem suggest symptoms of dangerousness to self? Yes:     Past Current    Suicidal Thoughts: [x]  []    Suicidal Plans: [x]  []    Suicidal Intent: []  []    Suicide Attempts: [x]  []    Self-Injury []  []      For any boxes checked above, provide detail: SI today, no plan;  h/o 1 SA, 1990, tried to cut wrists, no medical or  tx      History of suicide by family member: no  History of suicide by friend/significant other: yes - 2 friends when pt was in high school  Recent change in frequency/specificity/intensity of suicidal thoughts or self-harm behavior? yes - today  Current access to firearms, medications, or other identified means of suicide/self-harm? no  If yes, willing to restrict access to means of suicide/self-harm? yes - no guns or weapons  Protective factors present:  Future-oriented, Good impulse control, Hopefulness, Optimism, Positive coping skills, Positive self-efficacy, Strong family connections, Actively engaged in treatment and Willing to address in treatment    SAFETY ASSESSMENT - OTHERS  Does patient acknowledge current or past symptoms of aggressive behavior or risk to others? no  Does parent/significant other report patient has  current or past symptoms of aggressive behavior or risk to others?  N\A  Does presenting problem suggest symptoms of dangerousness to others? No    Crisis Safety Plan completed and copy given to patient? yes    ABUSE/NEGLECT SCREENING  Does patient report feeling “unsafe” in his/her home, or afraid of anyone?  no  Does patient report any history of physical, sexual, or emotional abuse?  no  Does parent or significant other report any of the above? N\A  Is there evidence of neglect by self?  no  Is there evidence of neglect by a caregiver? no  Does the patient/parent report any history of CPS/APS/police involvement related to suspected abuse/neglect or domestic violence? no  Based on the information provided during the current assessment, is a mandated report of suspected abuse/neglect being made?  No    SUBSTANCE USE SCREENING  Pt denies current substance use    ETOH neigative  UDS pending collection      MENTAL STATUS   Participation: Active verbal participation, Attentive, Engaged and Open to feedback  Grooming: Casual and Neat  Orientation: Alert  Behavior: Calm  Eye contact: Good  Mood: Euthymic  Affect: Flexible, Full range and Congruent with content  Thought process: Logical and Goal-directed  Thought content: Within normal limits  Speech: Rate within normal limits and Volume within normal limits  Perception: Within normal limits  Memory:  No gross evidence of memory deficits  Insight: Adequate  Judgment:  Adequate  Other:    Collateral information:   Source:  [] Significant other present in person:   [] Significant other by telephone  [] [facility]   [x] [facility]  [x] [facility]  [] Other:     [] Unable to complete full assessment due to:  [] Acute intoxication  [] Patient declined to participate/engage  [] Patient verbally unresponsive  [] Significant cognitive deficits  [] Significant perceptual distortions or behavioral disorganization  [] Other:      CLINICAL  IMPRESSIONS:  Primary:  Bipolar disorder by history  Secondary:         IDENTIFIED NEEDS/PLAN:  [Trigger DISPOSITION list for any items marked]    []  Imminent safety risk - self [] Imminent safety risk - others   []  Acute substance withdrawal []  Psychosis/Impaired reality testing   [x]  Mood/anxiety []  Substance use/Addictive behavior   [x]  Maladaptive behaviro []  Parent/child conflict   []  Family/Couples conflict []  Biomedical   []  Housing []  Financial   []   Legal  Occupational/Educational   []  Domestic violence []  Other:     Disposition: Refer to [facility], [facility] and [facility] out[facility] ; Medicaid FFS insurance plan; writer RN reviewed [facility] resources with pt, with written information given; pt [facility] at 1100; pt to DC to self    Does patient express agreement with the above plan? yes    Referral appointment(s) scheduled? Yes[facility] at 1100    Alert team only:   I have discussed findings and recommendations with Dr. Abdullahiwho is in agreement with these recommendations. And will DC pt's legal hold    Referral information sent to the following community providers : NA    If applicable : Referred  to : NA for legal hold follow up at (time):       Elsy Badillo R.N.  8/23/2019

## 2019-08-23 NOTE — DISCHARGE PLANNING
[facility]  Crisis/Safety Plan    Name:  Mary Martinez  MRN:  [id number]  Date:  2019    ? [facility]  [phone]  ? Therapy appt. With Adrian Hale LCSW, 19, Friday, at 10:00      Warning signs that a crisis may be developing for me or I may be at risk:  1) getting angry  2) yelling  3)    Coping strategies I can use on my own (relaxation, physical activity, etc):  1) breathing  2) walking  3) spending time with my children    Ways I can make my environment safe:  1) no guns or weapons  2) be with family  3)    Things I want to tell myself when I feel a crisis developin) breath  2) it is not that bad  3) walk away    People I can contact for support or distraction (and their phone numbers):  1)   2) mom  3) father  Phone numbers in pt's cell phone    If I’m not able to reach my support people, or the above strategies don’t help, I can contact the following professionals, agencies, or hotlines:  1) Crisis Call Center ():  [phone] -OR- [phone]  2) Crisis Text Line ():  Text CARE TO 579779  3) [facility]  [phone]  4) [facility] [phone]    Elsy Badillo R.N.

## 2019-09-12 ENCOUNTER — OFFICE VISIT (OUTPATIENT)
Dept: PHYSICAL MEDICINE AND REHAB | Facility: MEDICAL CENTER | Age: 49
End: 2019-09-12
Payer: MEDICAID

## 2019-09-12 VITALS
SYSTOLIC BLOOD PRESSURE: 110 MMHG | WEIGHT: 128.31 LBS | HEART RATE: 86 BPM | HEIGHT: 63 IN | OXYGEN SATURATION: 97 % | TEMPERATURE: 98.3 F | DIASTOLIC BLOOD PRESSURE: 68 MMHG | BODY MASS INDEX: 22.73 KG/M2

## 2019-09-12 DIAGNOSIS — Z78.9 IMPAIRED MOBILITY AND ADLS: ICD-10-CM

## 2019-09-12 DIAGNOSIS — Z74.09 IMPAIRED MOBILITY AND ADLS: ICD-10-CM

## 2019-09-12 DIAGNOSIS — M25.532 PAIN IN BOTH WRISTS: ICD-10-CM

## 2019-09-12 DIAGNOSIS — M25.531 PAIN IN BOTH WRISTS: ICD-10-CM

## 2019-09-12 DIAGNOSIS — F32.A DEPRESSION, UNSPECIFIED DEPRESSION TYPE: ICD-10-CM

## 2019-09-12 DIAGNOSIS — F11.90 CHRONIC, CONTINUOUS USE OF OPIOIDS: ICD-10-CM

## 2019-09-12 DIAGNOSIS — F17.200 TOBACCO DEPENDENCE: ICD-10-CM

## 2019-09-12 DIAGNOSIS — M05.79 RHEUMATOID ARTHRITIS INVOLVING MULTIPLE SITES WITH POSITIVE RHEUMATOID FACTOR (HCC): ICD-10-CM

## 2019-09-12 PROCEDURE — 99214 OFFICE O/P EST MOD 30 MIN: CPT | Performed by: PHYSICAL MEDICINE & REHABILITATION

## 2019-09-12 RX ORDER — OXYCODONE HYDROCHLORIDE AND ACETAMINOPHEN 5; 325 MG/1; MG/1
1 TABLET ORAL EVERY 6 HOURS PRN
Qty: 120 TAB | Refills: 0 | Status: SHIPPED | OUTPATIENT
Start: 2019-09-23 | End: 2019-10-22 | Stop reason: SDUPTHER

## 2019-09-12 RX ORDER — MELOXICAM 15 MG/1
15 TABLET ORAL DAILY
Qty: 30 TAB | Refills: 0 | Status: ON HOLD | OUTPATIENT
Start: 2019-09-12 | End: 2019-10-14

## 2019-09-12 ASSESSMENT — PATIENT HEALTH QUESTIONNAIRE - PHQ9
CLINICAL INTERPRETATION OF PHQ2 SCORE: 1
5. POOR APPETITE OR OVEREATING: 2 - MORE THAN HALF THE DAYS
SUM OF ALL RESPONSES TO PHQ QUESTIONS 1-9: 12

## 2019-09-12 ASSESSMENT — PAIN SCALES - GENERAL: PAINLEVEL: 4=SLIGHT-MODERATE PAIN

## 2019-09-12 NOTE — PROGRESS NOTES
Follow up patient note  Pain Medicine, Interventional spine and sports physiatry, Physical medicine rehabilitation      Chief complaint:   Joint pain      HISTORY    Please see new patient note dated 06/08/2018 by Dr Kerr,  for more details.     [facility]  Patient identification: Mary Martinez 49 y.o. female returns for follow-up of her pain related to rheumatoid arthritis.      Interval history:   Mary presents for follow-up of her chronic pain complaints related to rheumatoid arthritis.     She has been having more trouble with her wrists.  They continue to swell and ache.  Her shoulders and neck have been bothering her more.    Washing her hair has been more trouble still, but she is able to help.  ROM of the shoulders limits this    She has discontinued lyrica without issues.    Gabapentin is well-tolerated.      Stress at home is making it difficult for her to sleep.    Percocet 5/325 q6h helps keep her symptoms under control.  Regular bowel movements.  She is not taking OTC medications.  Unchanged.    Discussed smoking cessation, but she is not prioritizing this.  Unchanged, encouraged her to quit smoking.  She has been smoking a little bit less than 1 PPD    PHQ-9: 27, down to 12  ORT: 4    ROS  Unchanged from previous visit 8/16/2019 except as noted in the HPI  Red Flags :   Fever, Chills, Sweats: Denies  Involuntary Weight Loss: Denies  Bowel/Bladder Incontinence: Denies      PMHx:   Past Medical History:   Diagnosis Date   • ASTHMA     inhalers prn   • Dental disorder     upper partial   • Pain     everywhere   • Psychiatric problem     anxiety   • Rheumatoid arthritis(714.0) 2003       PSHx:   Past Surgical History:   Procedure Laterality Date   • FINGER ARTHROPLASTY Right 6/5/2017    Procedure: FINGER ARTHROPLASTY - LONG, RING AND SMALL VOLAR PLATE;  Surgeon: Lobo Rosen M.D.;  Location: SURGERY SAME DAY [facility];  Service:    • FINGER AMPUTATION  6/5/2017    Procedure: FINGER AMPUTATION  - LONG, RING AND SMALL AT THE PROXIMAL INTERPHALANGEAL JOINT;  Surgeon: Lobo Rosen M.D.;  Location: SURGERY SAME DAY [facility];  Service:    • FINGER ARTHROPLASTY Right 4/17/2017    Procedure: FINGER ARTHROPLASTY ;  Surgeon: Lobo Rosen M.D.;  Location: SURGERY SAME DAY [facility];  Service:    • FINGER AMPUTATION Right 9/14/2016    Procedure: FINGER AMPUTATION INDEX;  Surgeon: Lobo Rosen M.D.;  Location: SURGERY SAME DAY [facility];  Service:    • IRRIGATION & DEBRIDEMENT ORTHO Right 9/11/2016    Procedure: IRRIGATION & DEBRIDEMENT ORTHO - right index finger;  Surgeon: Madhu Rosen M.D.;  Location: SURGERY [facility];  Service:    • PIP ARTHRODESIS Right 8/29/2016    Procedure: RE-DO INDEX FINGER PROXIMAL INTERPHALANGEAL ARTHRODESIS;  Surgeon: Lobo Rosen M.D.;  Location: SURGERY SAME DAY [facility];  Service:    • BONE GRAFT Right 8/29/2016    Procedure: BONE GRAFT - DISTAL RADIUS ;  Surgeon: Lobo Rosen M.D.;  Location: SURGERY SAME DAY [facility];  Service:    • ARTHRODESIS Right 5/6/2016    Procedure: ARTHRODESIS INDEX FINGER PROXIMAL INTERPHALANGEAL;  Surgeon: Lobo Rosen M.D.;  Location: SURGERY SAME DAY [facility];  Service:    • FOOT RECONSTRUCTION RHEUMATIC Left 7/29/2015    Procedure: FOOT RECONSTRUCTION RHEUMATIC;  Surgeon: Heriberto Alves M.D.;  Location: [facility];  Service:    • TOE FUSION Right 5/27/2015    Procedure: TOE FUSION 1ST METATARSALPHALANGEAL JOINT;  Surgeon: Heriberto Alves M.D.;  Location: [facility];  Service:    • BONE SPUR EXCISION  5/27/2015    Procedure: BONE SPUR EXCISION METATARSAL HEAD 2-3;  Surgeon: Heriberto Alves M.D.;  Location: SURGERY [facility];  Service:    • HAMMERTOE CORRECTION  5/27/2015    Procedure: HAMMERTOE CORRECTION AND SOFT TISSUE RE-ALINGMENT 2-3 ;  Surgeon: Heriberto Alves M.D.;  Location: SURGERY [facility];  Service:    • EMANUEL BY  LAPAROSCOPY  2011    Performed by VERO WRIGHT at SURGERY [facility]   • ABDOMINAL ABSCESS DRAINAGE  2011    Performed by VERO WRIGHT at SURGERY [facility]   • OTHER  1982    tonsils   • APPENDECTOMY     • CHOLECYSTECTOMY     • GYN SURGERY      C section X 4   • OTHER ABDOMINAL SURGERY      small colon block   • PB  DELIVERY ONLY      x 4   • TONSILLECTOMY     • WRIST ORIF         Family history   History reviewed. No pertinent family history.      Medications:   Outpatient Medications Marked as Taking for the 19 encounter (Office Visit) with Jayy Kerr M.D.   Medication Sig Dispense Refill   • meloxicam (MOBIC) 15 MG tablet Take 1 Tab by mouth every day for 30 days. 30 Tab 0   • [START ON 2019] oxyCODONE-acetaminophen (PERCOCET) 5-325 MG Tab Take 1 Tab by mouth every 6 hours as needed for Severe Pain for up to 30 days. 120 Tab 0   • gabapentin (NEURONTIN) 800 MG tablet Take 1 Tab by mouth 4 times a day for 90 days. 360 Tab 0   • QUEtiapine (SEROQUEL) 100 MG Tab Take 100 mg by mouth every day.     • PARoxetine (PAXIL) 30 MG Tab 60 mg.     • alendronate (FOSAMAX) 70 MG Tab      • pantoprazole (PROTONIX) 40 MG Tablet Delayed Response      • Etanercept (ENBREL) 50 MG/ML Solution Prefilled Syringe Inject  as instructed every 7 days.     • albuterol (VENTOLIN OR PROVENTIL) 108 (90 BASE) MCG/ACT AERS inhalation aerosol Inhale 2 Puffs by mouth as needed for Shortness of Breath.         Allergies:   Allergies   Allergen Reactions   • Aripiprazole [Abilify]    • Nitrous Oxide Vomiting   • Penicillins Hives     Tolerates cephalosporins       Social Hx:   Social History     Socioeconomic History   • Marital status:      Spouse name: Not on file   • Number of children: Not on file   • Years of education: Not on file   • Highest education level: Not on file   Occupational History   • Not on file   Social Needs   • Financial resource strain: Not on file   • Food  "insecurity:     Worry: Not on file     Inability: Not on file   • Transportation needs:     Medical: Not on file     Non-medical: Not on file   Tobacco Use   • Smoking status: Current Every Day Smoker     Packs/day: 1.00     Years: 30.00     Pack years: 30.00     Types: Cigarettes   • Smokeless tobacco: Never Used   • Tobacco comment: 1 ppd   Substance and Sexual Activity   • Alcohol use: No   • Drug use: No   • Sexual activity: Not on file   Lifestyle   • Physical activity:     Days per week: Not on file     Minutes per session: Not on file   • Stress: Not on file   Relationships   • Social connections:     Talks on phone: Not on file     Gets together: Not on file     Attends Evangelical service: Not on file     Active member of club or organization: Not on file     Attends meetings of clubs or organizations: Not on file     Relationship status: Not on file   • Intimate partner violence:     Fear of current or ex partner: Not on file     Emotionally abused: Not on file     Physically abused: Not on file     Forced sexual activity: Not on file   Other Topics Concern   •  Service No   • Blood Transfusions Yes   • Caffeine Concern No   • Occupational Exposure No   • Hobby Hazards No   • Sleep Concern No   • Stress Concern Yes   • Weight Concern No   • Special Diet No   • Back Care No   • Exercise Yes   • Bike Helmet Yes   • Seat Belt Yes   • Self-Exams Yes   Social History Narrative    ** Merged History Encounter **              EXAMINATION     Physical Exam:   Vitals: /68 (BP Location: Left arm, Patient Position: Sitting, BP Cuff Size: Large adult long)   Pulse 86   Temp 36.8 °C (98.3 °F) (Temporal)   Ht 1.6 m (5' 3\")   Wt 58.2 kg (128 lb 4.9 oz)   SpO2 97%     Constitutional:   Body Habitus: Body mass index is 22.73 kg/m².  Cooperation: Fully cooperates with exam  Appearance: Well-groomed no disheveled, in no acute distress  Respiratory-  breathing comfortable on room air, no audible " wheezing  Cardiovascular-  No lower extremity edema is noted.   Psychiatric- alert and oriented ×3. Normal affect.   Musculoskeletal:     Reflexes 2+ biceps, triceps bilaterally    Partial hand amputation on the right at the MCPs; ulnar deviation on the left with MCP subluxation, mild atrophy of the hand intrinsics with wasting of the thenar eminence.  Mild edema in the right wrist.    Right fourth digit amputation without erythema or warmth.      Mild tenderness over the left wrist, no warmth or erythema.  Negative compression test.      MEDICAL DECISION MAKING    DATA    Labs: UDS 10/30/2018 consistent with medications.  Oxycodone and metabolites without other substances   UDS 04/19/2019 consistent with medications. Oxycodone and metabolites without other substances   UDS 07/19/2019 consistent with medications.  Oxycodone and metabolites without other substances     Imaging:  No new imaging  MRI cervical spine 07/31/2018:  Films reviewed.  These are my reads:  There is is note of motion at the atlantodental level with 5mm atlantodental inverval in flexion that reduces to 1-2 mm in extension.  Mild retrolisthesis of C4 on C5 and anterolisthesis of C5- on C6.  Disc extrusion at C6-7 with mild central canal stenosis    Xray left shoulder 2/5/2018 Humeral head is elevated.  Glenohumeral joint arthritis.    Reports reviewed:  Xray cervical spine 11/14/2018  1. No acute fracture  2. Persistent motion at the C1-2 joint as before, suggesting atlantoaxial instability  3. Minimal instability noted at C5-6 level as described.    MRI cervical spine 07/31/2018  1. Widening of the atlantodental interal in neutral and flexion positions with a 5mm space which reduces to 1-2 mm in extension  2. 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 Depression, unspecified depression type F32.9   2. Rheumatoid arthritis involving multiple sites with positive rheumatoid factor (HCC) M05.79   3. Tobacco dependence F17.200   4. Pain in both wrists M25.531    M25.532   5. Chronic, continuous use of opioids F11.90   6. Impaired mobility and ADLs Z74.09         ASSESSMENT and PLAN:     Mary Martinez 49 y.o. female with rheumatoid arthritis    Mary was seen today for follow-up.    Orders and management for this visit:    Continue gabapentin 800mg po QID. This was refilled for 90 days previously.    Trial of short term use of meloxicam.  Discussed taking this for 5-7 days and then holding.  She can use the remainder of the script in this same way.  She expressed understanding of intent.  Continue with Rheumatology.    Continue follow-up with Psychology and Psychiatry.    Encouraged follow-up with Ortho when able.  She reports that she will call today.    Refill percocet given.  UDS compliant    Plan to continue percocet for chronic pain.  Continue to follow-up with Dr. Dela Cruz, Rheumatology for management of other medications related to rheumatoid arthritis.    Encouraged her to continue with efforts to quit smoking.  We discussed making goal of gradually reducing rather than focusing on totally quitting, as this causes too much anxiety for her right now.  We talked about distraction techniques.  Time spent 4 minutes.    In prescribing controlled substances to this patient, I certify that I have obtained and reviewed the medical history of Mary Martinez. I have also made a good aristides effort to obtain applicable records from other providers who have treated the patient and records did not demonstrate any increased risk of substance abuse that would prevent me from prescribing controlled substances.     I have conducted a physical exam and documented it. I have reviewed Ms. Martinez’s prescription history  as maintained by the [place] Prescription Monitoring Program.   ORT 4, indicates moderate risk    I have assessed the patient’s risk for abuse, dependency, and addiction using the validated Opioid Risk Tool available at https://www.mdcalc.com/sombgb-ywit-fsso-ort-narcotic-abuse.     Given the above, I believe the benefits of controlled substance therapy outweigh the risks. The reasons for prescribing controlled substances include non-narcotic, oral analgesic alternatives have been inadequate for pain control and in my professional opinion, controlled substances are the only reasonable choice for this patient because her pain was note adequately controlled with alternatives and she has chronic progressive disease. Accordingly, I have discussed the risk and benefits, treatment plan, and alternative therapies with the patient.         Follow up: 4 weeks      Please note that this dictation was created using voice recognition software. I have made every reasonable attempt to correct obvious errors but there may be errors of grammar and content that I may have overlooked prior to finalization of this note.      Jayy Kerr MD  Interventional Spine and Sports Physiatry  Physical Medicine and Rehabilitation  [facility]

## 2019-09-29 ENCOUNTER — HOSPITAL ENCOUNTER (EMERGENCY)
Facility: MEDICAL CENTER | Age: 49
End: 2019-09-29
Attending: EMERGENCY MEDICINE
Payer: MEDICAID

## 2019-09-29 VITALS
OXYGEN SATURATION: 96 % | TEMPERATURE: 97 F | BODY MASS INDEX: 22.73 KG/M2 | WEIGHT: 128.31 LBS | HEART RATE: 87 BPM | SYSTOLIC BLOOD PRESSURE: 116 MMHG | DIASTOLIC BLOOD PRESSURE: 81 MMHG | RESPIRATION RATE: 14 BRPM | HEIGHT: 63 IN

## 2019-09-29 DIAGNOSIS — L08.9 FINGER INFECTION: ICD-10-CM

## 2019-09-29 PROCEDURE — 99283 EMERGENCY DEPT VISIT LOW MDM: CPT

## 2019-09-29 PROCEDURE — 700101 HCHG RX REV CODE 250: Performed by: EMERGENCY MEDICINE

## 2019-09-29 PROCEDURE — 700102 HCHG RX REV CODE 250 W/ 637 OVERRIDE(OP): Performed by: EMERGENCY MEDICINE

## 2019-09-29 PROCEDURE — A9270 NON-COVERED ITEM OR SERVICE: HCPCS | Performed by: EMERGENCY MEDICINE

## 2019-09-29 PROCEDURE — 303977 HCHG I & D

## 2019-09-29 RX ORDER — SULFAMETHOXAZOLE AND TRIMETHOPRIM 800; 160 MG/1; MG/1
1 TABLET ORAL EVERY 12 HOURS
Qty: 14 TAB | Refills: 0 | Status: ON HOLD | OUTPATIENT
Start: 2019-09-29 | End: 2019-10-14

## 2019-09-29 RX ORDER — OXYCODONE HYDROCHLORIDE AND ACETAMINOPHEN 5; 325 MG/1; MG/1
1 TABLET ORAL ONCE
Status: COMPLETED | OUTPATIENT
Start: 2019-09-29 | End: 2019-09-29

## 2019-09-29 RX ORDER — SULFAMETHOXAZOLE AND TRIMETHOPRIM 800; 160 MG/1; MG/1
1 TABLET ORAL ONCE
Status: COMPLETED | OUTPATIENT
Start: 2019-09-29 | End: 2019-09-29

## 2019-09-29 RX ORDER — LIDOCAINE HYDROCHLORIDE 10 MG/ML
20 INJECTION, SOLUTION INFILTRATION; PERINEURAL ONCE
Status: COMPLETED | OUTPATIENT
Start: 2019-09-29 | End: 2019-09-29

## 2019-09-29 RX ADMIN — LIDOCAINE HYDROCHLORIDE 20 ML: 10 INJECTION, SOLUTION INFILTRATION; PERINEURAL at 14:14

## 2019-09-29 RX ADMIN — SULFAMETHOXAZOLE AND TRIMETHOPRIM 1 TABLET: 800; 160 TABLET ORAL at 14:53

## 2019-09-29 RX ADMIN — OXYCODONE HYDROCHLORIDE AND ACETAMINOPHEN 1 TABLET: 5; 325 TABLET ORAL at 14:11

## 2019-09-29 ASSESSMENT — LIFESTYLE VARIABLES: DO YOU DRINK ALCOHOL: NO

## 2019-09-29 NOTE — ED PROVIDER NOTES
ED Provider Note    CHIEF COMPLAINT  Chief Complaint   Patient presents with   • Wound Check     infection to nub of 4th digit of RT hand. was awaiting to see ortho however, this morning she awoke and has discoloratoin to finger.     Seen at 1:57 PM    HPI  Mary Martinez is a 49 y.o. female who presents with pain and swelling on the knob of her right fourth finger.  She has severe rheumatoid arthritis, she had an infection in her index finger many years ago, this caused her to have spread to the other digits and subsequently she lost the distal phalanges of all of her digits on the right hand and now is left with the proximal phalanx throughout.  She requested at the time that she have the amputation go down to the metacarpals that she does not use the nubbins anyway and the only cause her pain when she bumps into them.    She presents today because the digit has been swelling intermittently over the last several weeks and she has been unable to see a hand surgeon.  She has not been on any antibiotics recently.  Over the past 24 hours the affected digit developed some reddish discoloration, more prominent swelling and a punctum distally that has spontaneously leaked some purulent fluid.    REVIEW OF SYSTEMS  See HPI  PAST MEDICAL HISTORY   has a past medical history of ASTHMA, Dental disorder, Pain, Psychiatric problem, and Rheumatoid arthritis(714.0) (2003).    SOCIAL HISTORY  Social History     Tobacco Use   • Smoking status: Current Every Day Smoker     Packs/day: 1.00     Years: 30.00     Pack years: 30.00     Types: Cigarettes   • Smokeless tobacco: Never Used   • Tobacco comment: 1 ppd   Substance and Sexual Activity   • Alcohol use: No   • Drug use: No   • Sexual activity: Not on file       SURGICAL HISTORY   has a past surgical history that includes gyn surgery; other (1982); nicholas by laparoscopy (9/27/2011); abdominal abscess drainage (9/27/2011); other abdominal surgery; tonsillectomy; wrist orif;  "appendectomy; cholecystectomy;  delivery only; toe fusion (Right, 2015); bone spur excision (2015); hammertoe correction (2015); foot reconstruction rheumatic (Left, 2015); arthrodesis (Right, 2016); pip arthrodesis (Right, 2016); bone graft (Right, 2016); irrigation & debridement ortho (Right, 2016); finger amputation (Right, 2016); finger arthroplasty (Right, 2017); finger arthroplasty (Right, 2017); and finger amputation (2017).    CURRENT MEDICATIONS  Reviewed.  See Encounter Summary.     ALLERGIES  Allergies   Allergen Reactions   • Aripiprazole [Abilify]    • Nitrous Oxide Vomiting   • Penicillins Hives     Tolerates cephalosporins       PHYSICAL EXAM  VITAL SIGNS: /81   Pulse 87   Temp 36.1 °C (97 °F)   Resp 14   Ht 1.6 m (5' 3\")   Wt 58.2 kg (128 lb 4.9 oz)   LMP 2011   SpO2 96%   BMI 22.73 kg/m²   Constitutional: Awake, alert in no apparent distress.  HENT: Normocephalic, Bilateral external ears normal. Nose normal.   Eyes: Conjunctiva normal, non-icteric, EOMI.    Thorax & Lungs: Easy unlabored respirations  Cardiovascular:    Abdomen:  No distention  Skin: Visualized skin is  Dry, No erythema, No rash.   Extremities: Right hand: Status post amputations of all intermediate and distal phalanges, the fourth finger proximal phalanx has circumferential edema, mild erythema and a punctum distally.  She has no difficulty flexing or extending the MCP.    Neurologic: Alert, Grossly non-focal.   Psychiatric: Affect and Mood normal    RADIOLOGY  No orders to display       Nursing notes and vital signs were reviewed. (See chart for details)    1:57 PM-medical record reviewed, the patient's has a history of rheumatoid arthritis, she is on Percocet chronically.  She also was seen here and placed in a legal hold earlier in the year.    Procedure note: The fourth proximal phalanx was anesthetized using 1% lidocaine without epinephrine " and a ring block.  Following this a stab incision was made over the distal tip of the phalanx with a 11 blade.  A moderate amount of purulent fluid was expressed.  The wound was then irrigated with lidocaine for analgesia as well as to clean the wound.  This was tolerated very well without complications.  Topical antibiotic was applied and then a sterile dressing.    Decision Making:  This is a 49 y.o. year old female who presents with infection of the fingertip of the patient's stump of her proximal phalanx of the fourth finger.  She does not have any pain in the palmar aspect and she flexes and extends the MCP without difficulty.  Thus there is no sign of flexor tenosynovitis.  I counseled the patient extensively about this, she develops any pain on the palmar aspect or pain with this joint she needs to return immediately.  She has some issue with follow-up with the [facility], her prior surgeon was Dr. Smith but apparently did not take her insurance.  I explained that usually they will take ER follow-up.  I recommend she get seen there early this week, if she has another hand surgeon at [facility] she can be seen by them as well.  If she is unable to get any follow-up she will unfortunately need to return the emergency department.    DISPOSITION:  Patient will be discharged home in good condition.    Discharge Medications:  New Prescriptions    SULFAMETHOXAZOLE-TRIMETHOPRIM (BACTRIM DS) 800-160 MG TABLET    Take 1 Tab by mouth every 12 hours for 7 days.       The patient was discharged home (see d/c instructions) and told to return immediately for any signs or symptoms listed, or any worsening at all.  The patient verbally agreed to the discharge precautions and follow-up plan which is documented in EPIC.          FINAL IMPRESSION  1. Finger infection

## 2019-09-29 NOTE — ED TRIAGE NOTES
"   Chief Complaint   Patient presents with   • Wound Check     infection to nub of 4th digit of RT hand. was awaiting to see ortho however, this morning she awoke and has discoloratoin to finger.     Pt to triage for above. Hx of RA. Denies diabetes.     BP (!) 97/58   Pulse 90   Temp 36.1 °C (97 °F)   Resp 15   Ht 1.6 m (5' 3\")   Wt 58.2 kg (128 lb 4.9 oz)   LMP 09/21/2011   SpO2 95%   BMI 22.73 kg/m²     "

## 2019-10-04 ENCOUNTER — APPOINTMENT (OUTPATIENT)
Dept: RADIOLOGY | Facility: MEDICAL CENTER | Age: 49
DRG: 329 | End: 2019-10-04
Attending: EMERGENCY MEDICINE
Payer: MEDICAID

## 2019-10-04 ENCOUNTER — ANESTHESIA EVENT (OUTPATIENT)
Dept: SURGERY | Facility: MEDICAL CENTER | Age: 49
DRG: 329 | End: 2019-10-04
Payer: MEDICAID

## 2019-10-04 ENCOUNTER — HOSPITAL ENCOUNTER (INPATIENT)
Facility: MEDICAL CENTER | Age: 49
LOS: 10 days | DRG: 329 | End: 2019-10-14
Attending: EMERGENCY MEDICINE | Admitting: INTERNAL MEDICINE
Payer: MEDICAID

## 2019-10-04 ENCOUNTER — ANESTHESIA (OUTPATIENT)
Dept: SURGERY | Facility: MEDICAL CENTER | Age: 49
DRG: 329 | End: 2019-10-04
Payer: MEDICAID

## 2019-10-04 DIAGNOSIS — A41.9 SEPSIS WITHOUT ACUTE ORGAN DYSFUNCTION, DUE TO UNSPECIFIED ORGANISM (HCC): ICD-10-CM

## 2019-10-04 DIAGNOSIS — K66.8: ICD-10-CM

## 2019-10-04 DIAGNOSIS — E86.0 DEHYDRATION: ICD-10-CM

## 2019-10-04 DIAGNOSIS — R19.8 PERFORATED VISCUS: ICD-10-CM

## 2019-10-04 DIAGNOSIS — R10.84 GENERALIZED ABDOMINAL PAIN: ICD-10-CM

## 2019-10-04 DIAGNOSIS — K65.9 ABDOMINAL INFECTION (HCC): ICD-10-CM

## 2019-10-04 PROBLEM — K63.1 DUODENAL PERFORATION (HCC): Status: ACTIVE | Noted: 2019-10-04

## 2019-10-04 PROBLEM — N17.9 AKI (ACUTE KIDNEY INJURY) (HCC): Status: ACTIVE | Noted: 2019-10-04

## 2019-10-04 PROBLEM — E87.6 HYPOKALEMIA: Status: ACTIVE | Noted: 2019-10-04

## 2019-10-04 LAB
ALBUMIN SERPL BCP-MCNC: 4.3 G/DL (ref 3.2–4.9)
ALBUMIN/GLOB SERPL: 1.2 G/DL
ALP SERPL-CCNC: 121 U/L (ref 30–99)
ALT SERPL-CCNC: 7 U/L (ref 2–50)
ANION GAP SERPL CALC-SCNC: 12 MMOL/L (ref 0–11.9)
APPEARANCE UR: CLEAR
AST SERPL-CCNC: 11 U/L (ref 12–45)
BACTERIA #/AREA URNS HPF: NEGATIVE /HPF
BASOPHILS # BLD AUTO: 0.4 % (ref 0–1.8)
BASOPHILS # BLD: 0.08 K/UL (ref 0–0.12)
BILIRUB SERPL-MCNC: 0.3 MG/DL (ref 0.1–1.5)
BILIRUB UR QL STRIP.AUTO: NEGATIVE
BUN SERPL-MCNC: 18 MG/DL (ref 8–22)
CALCIUM SERPL-MCNC: 9.5 MG/DL (ref 8.5–10.5)
CHLORIDE SERPL-SCNC: 112 MMOL/L (ref 96–112)
CO2 SERPL-SCNC: 11 MMOL/L (ref 20–33)
COLOR UR: YELLOW
CREAT SERPL-MCNC: 1.02 MG/DL (ref 0.5–1.4)
EOSINOPHIL # BLD AUTO: 0.02 K/UL (ref 0–0.51)
EOSINOPHIL NFR BLD: 0.1 % (ref 0–6.9)
EPI CELLS #/AREA URNS HPF: ABNORMAL /HPF
ERYTHROCYTE [DISTWIDTH] IN BLOOD BY AUTOMATED COUNT: 57.1 FL (ref 35.9–50)
ETHANOL BLD-MCNC: 0 G/DL
GLOBULIN SER CALC-MCNC: 3.5 G/DL (ref 1.9–3.5)
GLUCOSE SERPL-MCNC: 154 MG/DL (ref 65–99)
GLUCOSE UR STRIP.AUTO-MCNC: NEGATIVE MG/DL
HCT VFR BLD AUTO: 37.8 % (ref 37–47)
HGB BLD-MCNC: 12.4 G/DL (ref 12–16)
HYALINE CASTS #/AREA URNS LPF: ABNORMAL /LPF
IMM GRANULOCYTES # BLD AUTO: 0.2 K/UL (ref 0–0.11)
IMM GRANULOCYTES NFR BLD AUTO: 1 % (ref 0–0.9)
KETONES UR STRIP.AUTO-MCNC: 15 MG/DL
LACTATE BLD-SCNC: 1 MMOL/L (ref 0.5–2)
LACTATE BLD-SCNC: 1.7 MMOL/L (ref 0.5–2)
LEUKOCYTE ESTERASE UR QL STRIP.AUTO: NEGATIVE
LIPASE SERPL-CCNC: 48 U/L (ref 11–82)
LYMPHOCYTES # BLD AUTO: 0.51 K/UL (ref 1–4.8)
LYMPHOCYTES NFR BLD: 2.4 % (ref 22–41)
MCH RBC QN AUTO: 33.2 PG (ref 27–33)
MCHC RBC AUTO-ENTMCNC: 32.8 G/DL (ref 33.6–35)
MCV RBC AUTO: 101.3 FL (ref 81.4–97.8)
MICRO URNS: ABNORMAL
MONOCYTES # BLD AUTO: 0.49 K/UL (ref 0–0.85)
MONOCYTES NFR BLD AUTO: 2.3 % (ref 0–13.4)
NEUTROPHILS # BLD AUTO: 19.68 K/UL (ref 2–7.15)
NEUTROPHILS NFR BLD: 93.8 % (ref 44–72)
NITRITE UR QL STRIP.AUTO: NEGATIVE
NRBC # BLD AUTO: 0 K/UL
NRBC BLD-RTO: 0 /100 WBC
PH UR STRIP.AUTO: 6 [PH] (ref 5–8)
PLATELET # BLD AUTO: 279 K/UL (ref 164–446)
PMV BLD AUTO: 11.1 FL (ref 9–12.9)
POTASSIUM SERPL-SCNC: 3.4 MMOL/L (ref 3.6–5.5)
PROT SERPL-MCNC: 7.8 G/DL (ref 6–8.2)
PROT UR QL STRIP: 100 MG/DL
RBC # BLD AUTO: 3.73 M/UL (ref 4.2–5.4)
RBC # URNS HPF: ABNORMAL /HPF
RBC UR QL AUTO: NEGATIVE
SODIUM SERPL-SCNC: 135 MMOL/L (ref 135–145)
SP GR UR STRIP.AUTO: 1.03
UROBILINOGEN UR STRIP.AUTO-MCNC: 1 MG/DL
WBC # BLD AUTO: 21 K/UL (ref 4.8–10.8)
WBC #/AREA URNS HPF: ABNORMAL /HPF

## 2019-10-04 PROCEDURE — 501445 HCHG STAPLER, SKIN DISP: Performed by: SURGERY

## 2019-10-04 PROCEDURE — 160048 HCHG OR STATISTICAL LEVEL 1-5: Performed by: SURGERY

## 2019-10-04 PROCEDURE — 500389 HCHG DRAIN, RESERVOIR SUCT JP 100CC: Performed by: SURGERY

## 2019-10-04 PROCEDURE — 96365 THER/PROPH/DIAG IV INF INIT: CPT

## 2019-10-04 PROCEDURE — 99407 BEHAV CHNG SMOKING > 10 MIN: CPT | Performed by: INTERNAL MEDICINE

## 2019-10-04 PROCEDURE — 700111 HCHG RX REV CODE 636 W/ 250 OVERRIDE (IP): Performed by: EMERGENCY MEDICINE

## 2019-10-04 PROCEDURE — 700117 HCHG RX CONTRAST REV CODE 255: Performed by: EMERGENCY MEDICINE

## 2019-10-04 PROCEDURE — 160041 HCHG SURGERY MINUTES - EA ADDL 1 MIN LEVEL 4: Performed by: SURGERY

## 2019-10-04 PROCEDURE — 99233 SBSQ HOSP IP/OBS HIGH 50: CPT | Performed by: SURGERY

## 2019-10-04 PROCEDURE — 500378 HCHG DRAIN, J-VAC ROUND 19FR: Performed by: SURGERY

## 2019-10-04 PROCEDURE — 80053 COMPREHEN METABOLIC PANEL: CPT

## 2019-10-04 PROCEDURE — 160002 HCHG RECOVERY MINUTES (STAT): Performed by: SURGERY

## 2019-10-04 PROCEDURE — 700105 HCHG RX REV CODE 258: Performed by: ANESTHESIOLOGY

## 2019-10-04 PROCEDURE — 160009 HCHG ANES TIME/MIN: Performed by: SURGERY

## 2019-10-04 PROCEDURE — 700105 HCHG RX REV CODE 258: Performed by: EMERGENCY MEDICINE

## 2019-10-04 PROCEDURE — 74177 CT ABD & PELVIS W/CONTRAST: CPT

## 2019-10-04 PROCEDURE — 96367 TX/PROPH/DG ADDL SEQ IV INF: CPT

## 2019-10-04 PROCEDURE — A9270 NON-COVERED ITEM OR SERVICE: HCPCS | Performed by: EMERGENCY MEDICINE

## 2019-10-04 PROCEDURE — 700105 HCHG RX REV CODE 258: Performed by: INTERNAL MEDICINE

## 2019-10-04 PROCEDURE — 160036 HCHG PACU - EA ADDL 30 MINS PHASE I: Performed by: SURGERY

## 2019-10-04 PROCEDURE — 700111 HCHG RX REV CODE 636 W/ 250 OVERRIDE (IP): Performed by: ANESTHESIOLOGY

## 2019-10-04 PROCEDURE — A4314 CATH W/DRAINAGE 2-WAY LATEX: HCPCS | Performed by: SURGERY

## 2019-10-04 PROCEDURE — 160035 HCHG PACU - 1ST 60 MINS PHASE I: Performed by: SURGERY

## 2019-10-04 PROCEDURE — 700102 HCHG RX REV CODE 250 W/ 637 OVERRIDE(OP): Performed by: EMERGENCY MEDICINE

## 2019-10-04 PROCEDURE — 700101 HCHG RX REV CODE 250: Performed by: ANESTHESIOLOGY

## 2019-10-04 PROCEDURE — 700111 HCHG RX REV CODE 636 W/ 250 OVERRIDE (IP): Performed by: SURGERY

## 2019-10-04 PROCEDURE — 700101 HCHG RX REV CODE 250: Performed by: INTERNAL MEDICINE

## 2019-10-04 PROCEDURE — 83690 ASSAY OF LIPASE: CPT

## 2019-10-04 PROCEDURE — 81001 URINALYSIS AUTO W/SCOPE: CPT

## 2019-10-04 PROCEDURE — 80307 DRUG TEST PRSMV CHEM ANLYZR: CPT

## 2019-10-04 PROCEDURE — 700101 HCHG RX REV CODE 250: Performed by: EMERGENCY MEDICINE

## 2019-10-04 PROCEDURE — 96375 TX/PRO/DX INJ NEW DRUG ADDON: CPT

## 2019-10-04 PROCEDURE — 99291 CRITICAL CARE FIRST HOUR: CPT

## 2019-10-04 PROCEDURE — 110454 HCHG SHELL REV 250: Performed by: SURGERY

## 2019-10-04 PROCEDURE — 85025 COMPLETE CBC W/AUTO DIFF WBC: CPT

## 2019-10-04 PROCEDURE — 160029 HCHG SURGERY MINUTES - 1ST 30 MINS LEVEL 4: Performed by: SURGERY

## 2019-10-04 PROCEDURE — 700111 HCHG RX REV CODE 636 W/ 250 OVERRIDE (IP): Performed by: INTERNAL MEDICINE

## 2019-10-04 PROCEDURE — 0DU907Z SUPPLEMENT DUODENUM WITH AUTOLOGOUS TISSUE SUBSTITUTE, OPEN APPROACH: ICD-10-PCS | Performed by: SURGERY

## 2019-10-04 PROCEDURE — 160022 HCHG BLOCK: Performed by: SURGERY

## 2019-10-04 PROCEDURE — 87040 BLOOD CULTURE FOR BACTERIA: CPT | Mod: 91

## 2019-10-04 PROCEDURE — 770022 HCHG ROOM/CARE - ICU (200)

## 2019-10-04 PROCEDURE — 83605 ASSAY OF LACTIC ACID: CPT

## 2019-10-04 PROCEDURE — 501838 HCHG SUTURE GENERAL: Performed by: SURGERY

## 2019-10-04 PROCEDURE — 0W9H0ZZ DRAINAGE OF RETROPERITONEUM, OPEN APPROACH: ICD-10-PCS | Performed by: SURGERY

## 2019-10-04 PROCEDURE — 99291 CRITICAL CARE FIRST HOUR: CPT | Mod: 25 | Performed by: INTERNAL MEDICINE

## 2019-10-04 RX ORDER — QUETIAPINE FUMARATE 25 MG/1
100 TABLET, FILM COATED ORAL EVERY EVENING
Status: DISCONTINUED | OUTPATIENT
Start: 2019-10-04 | End: 2019-10-07

## 2019-10-04 RX ORDER — HYDROMORPHONE HYDROCHLORIDE 2 MG/ML
INJECTION, SOLUTION INTRAMUSCULAR; INTRAVENOUS; SUBCUTANEOUS PRN
Status: DISCONTINUED | OUTPATIENT
Start: 2019-10-04 | End: 2019-10-04 | Stop reason: SURG

## 2019-10-04 RX ORDER — BUPIVACAINE HYDROCHLORIDE 2.5 MG/ML
INJECTION, SOLUTION EPIDURAL; INFILTRATION; INTRACAUDAL PRN
Status: DISCONTINUED | OUTPATIENT
Start: 2019-10-04 | End: 2019-10-04 | Stop reason: SURG

## 2019-10-04 RX ORDER — OMEPRAZOLE 20 MG/1
20 CAPSULE, DELAYED RELEASE ORAL DAILY
Status: DISCONTINUED | OUTPATIENT
Start: 2019-10-04 | End: 2019-10-07

## 2019-10-04 RX ORDER — ONDANSETRON 4 MG/1
4 TABLET, ORALLY DISINTEGRATING ORAL EVERY 4 HOURS PRN
Status: DISCONTINUED | OUTPATIENT
Start: 2019-10-04 | End: 2019-10-14 | Stop reason: HOSPADM

## 2019-10-04 RX ORDER — HYDROMORPHONE HYDROCHLORIDE 1 MG/ML
0.5 INJECTION, SOLUTION INTRAMUSCULAR; INTRAVENOUS; SUBCUTANEOUS ONCE
Status: COMPLETED | OUTPATIENT
Start: 2019-10-04 | End: 2019-10-04

## 2019-10-04 RX ORDER — ONDANSETRON 2 MG/ML
4 INJECTION INTRAMUSCULAR; INTRAVENOUS
Status: DISCONTINUED | OUTPATIENT
Start: 2019-10-04 | End: 2019-10-04 | Stop reason: HOSPADM

## 2019-10-04 RX ORDER — HYDROMORPHONE HYDROCHLORIDE 1 MG/ML
0.2 INJECTION, SOLUTION INTRAMUSCULAR; INTRAVENOUS; SUBCUTANEOUS
Status: DISCONTINUED | OUTPATIENT
Start: 2019-10-04 | End: 2019-10-04 | Stop reason: HOSPADM

## 2019-10-04 RX ORDER — GABAPENTIN 400 MG/1
800 CAPSULE ORAL 4 TIMES DAILY
Status: DISCONTINUED | OUTPATIENT
Start: 2019-10-04 | End: 2019-10-07

## 2019-10-04 RX ORDER — SODIUM CHLORIDE, SODIUM GLUCONATE, SODIUM ACETATE, POTASSIUM CHLORIDE AND MAGNESIUM CHLORIDE 526; 502; 368; 37; 30 MG/100ML; MG/100ML; MG/100ML; MG/100ML; MG/100ML
INJECTION, SOLUTION INTRAVENOUS
Status: DISCONTINUED | OUTPATIENT
Start: 2019-10-04 | End: 2019-10-04 | Stop reason: SURG

## 2019-10-04 RX ORDER — ONDANSETRON 2 MG/ML
4 INJECTION INTRAMUSCULAR; INTRAVENOUS EVERY 4 HOURS PRN
Status: DISCONTINUED | OUTPATIENT
Start: 2019-10-04 | End: 2019-10-14 | Stop reason: HOSPADM

## 2019-10-04 RX ORDER — DIPHENHYDRAMINE HYDROCHLORIDE 50 MG/ML
12.5 INJECTION INTRAMUSCULAR; INTRAVENOUS
Status: DISCONTINUED | OUTPATIENT
Start: 2019-10-04 | End: 2019-10-04 | Stop reason: HOSPADM

## 2019-10-04 RX ORDER — MELOXICAM 7.5 MG/1
15 TABLET ORAL DAILY
Status: DISCONTINUED | OUTPATIENT
Start: 2019-10-04 | End: 2019-10-07

## 2019-10-04 RX ORDER — KETOROLAC TROMETHAMINE 30 MG/ML
15 INJECTION, SOLUTION INTRAMUSCULAR; INTRAVENOUS ONCE
Status: DISCONTINUED | OUTPATIENT
Start: 2019-10-04 | End: 2019-10-04 | Stop reason: HOSPADM

## 2019-10-04 RX ORDER — HYDROMORPHONE HYDROCHLORIDE 1 MG/ML
0.4 INJECTION, SOLUTION INTRAMUSCULAR; INTRAVENOUS; SUBCUTANEOUS
Status: DISCONTINUED | OUTPATIENT
Start: 2019-10-04 | End: 2019-10-04 | Stop reason: HOSPADM

## 2019-10-04 RX ORDER — HYDROMORPHONE HYDROCHLORIDE 1 MG/ML
0.1 INJECTION, SOLUTION INTRAMUSCULAR; INTRAVENOUS; SUBCUTANEOUS
Status: DISCONTINUED | OUTPATIENT
Start: 2019-10-04 | End: 2019-10-04 | Stop reason: HOSPADM

## 2019-10-04 RX ORDER — PROMETHAZINE HYDROCHLORIDE 25 MG/1
12.5-25 SUPPOSITORY RECTAL EVERY 4 HOURS PRN
Status: DISCONTINUED | OUTPATIENT
Start: 2019-10-04 | End: 2019-10-05

## 2019-10-04 RX ORDER — HALOPERIDOL 5 MG/ML
1 INJECTION INTRAMUSCULAR
Status: DISCONTINUED | OUTPATIENT
Start: 2019-10-04 | End: 2019-10-04 | Stop reason: HOSPADM

## 2019-10-04 RX ORDER — ACETAMINOPHEN 325 MG/1
650 TABLET ORAL EVERY 6 HOURS PRN
Status: DISCONTINUED | OUTPATIENT
Start: 2019-10-04 | End: 2019-10-14 | Stop reason: HOSPADM

## 2019-10-04 RX ORDER — OXYCODONE HYDROCHLORIDE AND ACETAMINOPHEN 5; 325 MG/1; MG/1
1 TABLET ORAL EVERY 6 HOURS PRN
Status: DISCONTINUED | OUTPATIENT
Start: 2019-10-04 | End: 2019-10-04

## 2019-10-04 RX ORDER — BISACODYL 10 MG
10 SUPPOSITORY, RECTAL RECTAL
Status: DISCONTINUED | OUTPATIENT
Start: 2019-10-04 | End: 2019-10-07

## 2019-10-04 RX ORDER — MORPHINE SULFATE 4 MG/ML
2 INJECTION, SOLUTION INTRAMUSCULAR; INTRAVENOUS
Status: DISCONTINUED | OUTPATIENT
Start: 2019-10-04 | End: 2019-10-04

## 2019-10-04 RX ORDER — ONDANSETRON 2 MG/ML
4 INJECTION INTRAMUSCULAR; INTRAVENOUS ONCE
Status: COMPLETED | OUTPATIENT
Start: 2019-10-04 | End: 2019-10-04

## 2019-10-04 RX ORDER — PROMETHAZINE HYDROCHLORIDE 25 MG/1
12.5-25 TABLET ORAL EVERY 4 HOURS PRN
Status: DISCONTINUED | OUTPATIENT
Start: 2019-10-04 | End: 2019-10-05

## 2019-10-04 RX ORDER — AMOXICILLIN 250 MG
2 CAPSULE ORAL 2 TIMES DAILY
Status: DISCONTINUED | OUTPATIENT
Start: 2019-10-04 | End: 2019-10-07

## 2019-10-04 RX ORDER — ROCURONIUM BROMIDE 10 MG/ML
INJECTION, SOLUTION INTRAVENOUS PRN
Status: DISCONTINUED | OUTPATIENT
Start: 2019-10-04 | End: 2019-10-04 | Stop reason: SURG

## 2019-10-04 RX ORDER — PROCHLORPERAZINE EDISYLATE 5 MG/ML
5-10 INJECTION INTRAMUSCULAR; INTRAVENOUS EVERY 4 HOURS PRN
Status: DISCONTINUED | OUTPATIENT
Start: 2019-10-04 | End: 2019-10-05

## 2019-10-04 RX ORDER — SUCCINYLCHOLINE CHLORIDE 20 MG/ML
INJECTION INTRAMUSCULAR; INTRAVENOUS PRN
Status: DISCONTINUED | OUTPATIENT
Start: 2019-10-04 | End: 2019-10-04 | Stop reason: SURG

## 2019-10-04 RX ORDER — CIPROFLOXACIN 2 MG/ML
400 INJECTION, SOLUTION INTRAVENOUS ONCE
Status: COMPLETED | OUTPATIENT
Start: 2019-10-04 | End: 2019-10-04

## 2019-10-04 RX ORDER — PAROXETINE HYDROCHLORIDE 20 MG/1
60 TABLET, FILM COATED ORAL EVERY EVENING
Status: DISCONTINUED | OUTPATIENT
Start: 2019-10-04 | End: 2019-10-07

## 2019-10-04 RX ORDER — CEFOTETAN DISODIUM 2 G/20ML
INJECTION, POWDER, FOR SOLUTION INTRAMUSCULAR; INTRAVENOUS PRN
Status: DISCONTINUED | OUTPATIENT
Start: 2019-10-04 | End: 2019-10-04 | Stop reason: SURG

## 2019-10-04 RX ORDER — POLYETHYLENE GLYCOL 3350 17 G/17G
1 POWDER, FOR SOLUTION ORAL
Status: DISCONTINUED | OUTPATIENT
Start: 2019-10-04 | End: 2019-10-07

## 2019-10-04 RX ORDER — DEXAMETHASONE SODIUM PHOSPHATE 4 MG/ML
INJECTION, SOLUTION INTRA-ARTICULAR; INTRALESIONAL; INTRAMUSCULAR; INTRAVENOUS; SOFT TISSUE PRN
Status: DISCONTINUED | OUTPATIENT
Start: 2019-10-04 | End: 2019-10-04 | Stop reason: SURG

## 2019-10-04 RX ORDER — PHENYLEPHRINE HCL IN 0.9% NACL 0.5 MG/5ML
SYRINGE (ML) INTRAVENOUS PRN
Status: DISCONTINUED | OUTPATIENT
Start: 2019-10-04 | End: 2019-10-04 | Stop reason: SURG

## 2019-10-04 RX ORDER — SODIUM CHLORIDE 9 MG/ML
1000 INJECTION, SOLUTION INTRAVENOUS ONCE
Status: COMPLETED | OUTPATIENT
Start: 2019-10-04 | End: 2019-10-04

## 2019-10-04 RX ORDER — ONDANSETRON 2 MG/ML
INJECTION INTRAMUSCULAR; INTRAVENOUS PRN
Status: DISCONTINUED | OUTPATIENT
Start: 2019-10-04 | End: 2019-10-04 | Stop reason: SURG

## 2019-10-04 RX ORDER — SODIUM CHLORIDE 9 MG/ML
INJECTION, SOLUTION INTRAVENOUS CONTINUOUS
Status: DISCONTINUED | OUTPATIENT
Start: 2019-10-04 | End: 2019-10-06

## 2019-10-04 RX ORDER — HEPARIN SODIUM 5000 [USP'U]/ML
5000 INJECTION, SOLUTION INTRAVENOUS; SUBCUTANEOUS EVERY 8 HOURS
Status: DISCONTINUED | OUTPATIENT
Start: 2019-10-04 | End: 2019-10-14 | Stop reason: HOSPADM

## 2019-10-04 RX ORDER — PANTOPRAZOLE SODIUM 40 MG/1
40 TABLET, DELAYED RELEASE ORAL DAILY
Status: DISCONTINUED | OUTPATIENT
Start: 2019-10-04 | End: 2019-10-04

## 2019-10-04 RX ORDER — MIDAZOLAM HYDROCHLORIDE 1 MG/ML
INJECTION INTRAMUSCULAR; INTRAVENOUS PRN
Status: DISCONTINUED | OUTPATIENT
Start: 2019-10-04 | End: 2019-10-04 | Stop reason: SURG

## 2019-10-04 RX ADMIN — SUCCINYLCHOLINE CHLORIDE 100 MG: 20 INJECTION, SOLUTION INTRAMUSCULAR; INTRAVENOUS at 09:15

## 2019-10-04 RX ADMIN — MIDAZOLAM 2 MG: 1 INJECTION INTRAMUSCULAR; INTRAVENOUS at 09:11

## 2019-10-04 RX ADMIN — ROCURONIUM BROMIDE 30 MG: 10 INJECTION, SOLUTION INTRAVENOUS at 09:42

## 2019-10-04 RX ADMIN — CEFOTETAN DISODIUM 2000 MG: 2 INJECTION, POWDER, FOR SOLUTION INTRAMUSCULAR; INTRAVENOUS at 09:19

## 2019-10-04 RX ADMIN — FAMOTIDINE 20 MG: 10 INJECTION INTRAVENOUS at 03:44

## 2019-10-04 RX ADMIN — SODIUM CHLORIDE: 9 INJECTION, SOLUTION INTRAVENOUS at 12:21

## 2019-10-04 RX ADMIN — HYDROMORPHONE HYDROCHLORIDE 0.5 MG: 1 INJECTION, SOLUTION INTRAMUSCULAR; INTRAVENOUS; SUBCUTANEOUS at 06:46

## 2019-10-04 RX ADMIN — SUGAMMADEX 200 MG: 100 INJECTION, SOLUTION INTRAVENOUS at 10:26

## 2019-10-04 RX ADMIN — HYDROMORPHONE HYDROCHLORIDE 0.4 MG: 1 INJECTION, SOLUTION INTRAMUSCULAR; INTRAVENOUS; SUBCUTANEOUS at 11:29

## 2019-10-04 RX ADMIN — Medication 200 MCG: at 09:26

## 2019-10-04 RX ADMIN — PROPOFOL 150 MG: 10 INJECTION, EMULSION INTRAVENOUS at 09:15

## 2019-10-04 RX ADMIN — MORPHINE SULFATE 1 MG: 4 INJECTION INTRAVENOUS at 14:11

## 2019-10-04 RX ADMIN — IOHEXOL 80 ML: 350 INJECTION, SOLUTION INTRAVENOUS at 06:36

## 2019-10-04 RX ADMIN — FENTANYL CITRATE 50 MCG: 50 INJECTION INTRAMUSCULAR; INTRAVENOUS at 11:51

## 2019-10-04 RX ADMIN — FENTANYL CITRATE 25 MCG: 50 INJECTION INTRAMUSCULAR; INTRAVENOUS at 11:05

## 2019-10-04 RX ADMIN — FENTANYL CITRATE 50 MCG: 50 INJECTION INTRAMUSCULAR; INTRAVENOUS at 11:18

## 2019-10-04 RX ADMIN — METRONIDAZOLE 500 MG: 500 INJECTION, SOLUTION INTRAVENOUS at 05:46

## 2019-10-04 RX ADMIN — IOHEXOL 25 ML: 240 INJECTION, SOLUTION INTRATHECAL; INTRAVASCULAR; INTRAVENOUS; ORAL at 06:39

## 2019-10-04 RX ADMIN — LIDOCAINE HYDROCHLORIDE 30 ML: 20 SOLUTION OROPHARYNGEAL at 03:43

## 2019-10-04 RX ADMIN — ONDANSETRON 4 MG: 2 INJECTION INTRAMUSCULAR; INTRAVENOUS at 09:57

## 2019-10-04 RX ADMIN — METRONIDAZOLE 500 MG: 500 INJECTION, SOLUTION INTRAVENOUS at 13:45

## 2019-10-04 RX ADMIN — Medication: at 17:35

## 2019-10-04 RX ADMIN — HYDROMORPHONE HYDROCHLORIDE 2 MG: 2 INJECTION, SOLUTION INTRAMUSCULAR; INTRAVENOUS; SUBCUTANEOUS at 09:34

## 2019-10-04 RX ADMIN — SODIUM CHLORIDE 1000 ML: 9 INJECTION, SOLUTION INTRAVENOUS at 07:39

## 2019-10-04 RX ADMIN — CIPROFLOXACIN 400 MG: 2 INJECTION, SOLUTION INTRAVENOUS at 04:48

## 2019-10-04 RX ADMIN — Medication 200 MCG: at 09:52

## 2019-10-04 RX ADMIN — SODIUM CHLORIDE, SODIUM GLUCONATE, SODIUM ACETATE, POTASSIUM CHLORIDE AND MAGNESIUM CHLORIDE: 526; 502; 368; 37; 30 INJECTION, SOLUTION INTRAVENOUS at 09:27

## 2019-10-04 RX ADMIN — HYDROMORPHONE HYDROCHLORIDE 0.4 MG: 1 INJECTION, SOLUTION INTRAMUSCULAR; INTRAVENOUS; SUBCUTANEOUS at 11:35

## 2019-10-04 RX ADMIN — ONDANSETRON 4 MG: 2 INJECTION INTRAMUSCULAR; INTRAVENOUS at 04:48

## 2019-10-04 RX ADMIN — ROCURONIUM BROMIDE 20 MG: 10 INJECTION, SOLUTION INTRAVENOUS at 09:19

## 2019-10-04 RX ADMIN — BUPIVACAINE HYDROCHLORIDE 50 ML: 2.5 INJECTION, SOLUTION EPIDURAL; INFILTRATION; INTRACAUDAL; PERINEURAL at 10:25

## 2019-10-04 RX ADMIN — METRONIDAZOLE 500 MG: 500 INJECTION, SOLUTION INTRAVENOUS at 22:23

## 2019-10-04 RX ADMIN — FENTANYL CITRATE 25 MCG: 50 INJECTION INTRAMUSCULAR; INTRAVENOUS at 11:23

## 2019-10-04 RX ADMIN — SODIUM CHLORIDE 1000 ML: 9 INJECTION, SOLUTION INTRAVENOUS at 03:45

## 2019-10-04 RX ADMIN — HYDROMORPHONE HYDROCHLORIDE 0.2 MG: 1 INJECTION, SOLUTION INTRAMUSCULAR; INTRAVENOUS; SUBCUTANEOUS at 11:40

## 2019-10-04 RX ADMIN — CEFEPIME 2 G: 2 INJECTION, POWDER, FOR SOLUTION INTRAVENOUS at 07:39

## 2019-10-04 RX ADMIN — SODIUM CHLORIDE: 9 INJECTION, SOLUTION INTRAVENOUS at 09:11

## 2019-10-04 RX ADMIN — HALOPERIDOL LACTATE 1 MG: 5 INJECTION, SOLUTION INTRAMUSCULAR at 11:51

## 2019-10-04 RX ADMIN — FENTANYL CITRATE 150 MCG: 50 INJECTION, SOLUTION INTRAMUSCULAR; INTRAVENOUS at 09:15

## 2019-10-04 RX ADMIN — FAMOTIDINE 20 MG: 10 INJECTION INTRAVENOUS at 17:00

## 2019-10-04 RX ADMIN — HEPARIN SODIUM 5000 UNITS: 5000 INJECTION, SOLUTION INTRAVENOUS; SUBCUTANEOUS at 22:22

## 2019-10-04 RX ADMIN — DEXAMETHASONE SODIUM PHOSPHATE 4 MG: 4 INJECTION, SOLUTION INTRA-ARTICULAR; INTRALESIONAL; INTRAMUSCULAR; INTRAVENOUS; SOFT TISSUE at 09:19

## 2019-10-04 ASSESSMENT — PATIENT HEALTH QUESTIONNAIRE - PHQ9
6. FEELING BAD ABOUT YOURSELF - OR THAT YOU ARE A FAILURE OR HAVE LET YOURSELF OR YOUR FAMILY DOWN: NEARLY EVERY DAY
2. FEELING DOWN, DEPRESSED, IRRITABLE, OR HOPELESS: NEARLY EVERY DAY
8. MOVING OR SPEAKING SO SLOWLY THAT OTHER PEOPLE COULD HAVE NOTICED. OR THE OPPOSITE, BEING SO FIGETY OR RESTLESS THAT YOU HAVE BEEN MOVING AROUND A LOT MORE THAN USUAL: NEARLY EVERY DAY
1. LITTLE INTEREST OR PLEASURE IN DOING THINGS: NEARLY EVERY DAY
4. FEELING TIRED OR HAVING LITTLE ENERGY: NEARLY EVERY DAY
3. TROUBLE FALLING OR STAYING ASLEEP OR SLEEPING TOO MUCH: NEARLY EVERY DAY
SUM OF ALL RESPONSES TO PHQ9 QUESTIONS 1 AND 2: 6
5. POOR APPETITE OR OVEREATING: NOT AT ALL
SUM OF ALL RESPONSES TO PHQ QUESTIONS 1-9: 21
7. TROUBLE CONCENTRATING ON THINGS, SUCH AS READING THE NEWSPAPER OR WATCHING TELEVISION: NEARLY EVERY DAY
9. THOUGHTS THAT YOU WOULD BE BETTER OFF DEAD, OR OF HURTING YOURSELF: NOT AT ALL

## 2019-10-04 ASSESSMENT — ENCOUNTER SYMPTOMS
EYE REDNESS: 0
INSOMNIA: 0
SPUTUM PRODUCTION: 0
FEVER: 0
CHILLS: 1
ORTHOPNEA: 0
NECK PAIN: 0
VOMITING: 0
STRIDOR: 0
BACK PAIN: 0
DEPRESSION: 0
WEIGHT LOSS: 0
NERVOUS/ANXIOUS: 0
SEIZURES: 0
EYE PAIN: 0
DIZZINESS: 0
SHORTNESS OF BREATH: 0
EYE DISCHARGE: 0
BLURRED VISION: 0
DIARRHEA: 0
COUGH: 0
PALPITATIONS: 0
FOCAL WEAKNESS: 0
HEARTBURN: 0
MYALGIAS: 0
ABDOMINAL PAIN: 1
NAUSEA: 0
HEADACHES: 0

## 2019-10-04 ASSESSMENT — COGNITIVE AND FUNCTIONAL STATUS - GENERAL
MOVING FROM LYING ON BACK TO SITTING ON SIDE OF FLAT BED: A LOT
WALKING IN HOSPITAL ROOM: A LOT
HELP NEEDED FOR BATHING: A LOT
TURNING FROM BACK TO SIDE WHILE IN FLAT BAD: A LOT
DRESSING REGULAR LOWER BODY CLOTHING: A LOT
SUGGESTED CMS G CODE MODIFIER DAILY ACTIVITY: CK
SUGGESTED CMS G CODE MODIFIER MOBILITY: CL
MOBILITY SCORE: 12
PERSONAL GROOMING: A LITTLE
STANDING UP FROM CHAIR USING ARMS: A LOT
MOVING TO AND FROM BED TO CHAIR: A LOT
TOILETING: A LOT
DAILY ACTIVITIY SCORE: 14
EATING MEALS: A LITTLE
CLIMB 3 TO 5 STEPS WITH RAILING: A LOT
DRESSING REGULAR UPPER BODY CLOTHING: A LOT

## 2019-10-04 ASSESSMENT — LIFESTYLE VARIABLES
AVERAGE NUMBER OF DAYS PER WEEK YOU HAVE A DRINK CONTAINING ALCOHOL: 0
TOTAL SCORE: 0
DO YOU DRINK ALCOHOL: NO
HOW MANY TIMES IN THE PAST YEAR HAVE YOU HAD 5 OR MORE DRINKS IN A DAY: 0
HAVE YOU EVER FELT YOU SHOULD CUT DOWN ON YOUR DRINKING: NO
EVER FELT BAD OR GUILTY ABOUT YOUR DRINKING: NO
EVER HAD A DRINK FIRST THING IN THE MORNING TO STEADY YOUR NERVES TO GET RID OF A HANGOVER: NO
TOTAL SCORE: 0
ALCOHOL_USE: NO
ON A TYPICAL DAY WHEN YOU DRINK ALCOHOL HOW MANY DRINKS DO YOU HAVE: 0
TOTAL SCORE: 0
EVER_SMOKED: YES
CONSUMPTION TOTAL: NEGATIVE
HAVE PEOPLE ANNOYED YOU BY CRITICIZING YOUR DRINKING: NO

## 2019-10-04 ASSESSMENT — PAIN DESCRIPTION - DESCRIPTORS: DESCRIPTORS: SHARP;STABBING;BURNING

## 2019-10-04 NOTE — ANESTHESIA TIME REPORT
Anesthesia Start and Stop Event Times     Date Time Event    10/4/2019 0911 Ready for Procedure     0911 Anesthesia Start     1041 Anesthesia Stop        Responsible Staff  10/04/19    Name Role Begin End    Alejandro Ashford M.D. Anesth 0911 1041        Preop Diagnosis (Free Text):  Pre-op Diagnosis     PERITONITIS FREE AIR        Preop Diagnosis (Codes):    Post op Diagnosis  Duodenal ulcer perforation (HCC)      Premium Reason  Non-Premium    Comments:

## 2019-10-04 NOTE — ASSESSMENT & PLAN NOTE
Soft tissue gas in the perinephric space of the right kidney tracking into the retroperitoneum posterior to the liver with small quantity of free fluid in the perinephric space  10/4  Laparotomy repair of duodenal perforation, drain which peritoneal collection  10/7 DC NGT, JG drain and Prevena. Sips of clears.   10/8 Advance diet as tolerated. Continue PPI on discharge.  James Dumont MD. General Surgery

## 2019-10-04 NOTE — ED PROVIDER NOTES
ED Provider Note    Scribed for Petar Neri M.D. by Madhu Martinez. 10/4/2019  3:09 AM    CHIEF COMPLAINT  Chief Complaint   Patient presents with   • Abdominal Pain       HPI  Mary Martinez is a 49 y.o. female who presents to the ED with complaints of right upper quadrant and epigastric abdominal pain that acute onset this morning after waking up. She describes it as a sharp/stabbing pain. It was originally a 10/10, but this improved to a 4/10 after being given Fentanyl 100 mcg by EMS. She had nausea previously, but this resolved after being given Zofran en route. She also states that she has some bilateral flank pain. She denies any dysuria, vomiting, diarrhea, or measured fever. She has a history of anxiety, but this improved after being given Versed by EMS.    REVIEW OF SYSTEMS  Constitutional: No fevers, chills, or recent illness.  Skin: No rashes or diaphoresis.  Eyes: No change in vision, no discharge.  ENT: No hearing change. No rhinorrhea or nasal congestion, no ST or difficulty swallowing.  Respiratory: No SOB. No coughing or hemoptysis. No Wheezing.  Cardiac: No CP, palpitations, edema. No PND or orthopnea.  GI: RUQ and epigastric pain. No N/V; diarrhea, constipation. No blood in stool.  : Mild bilateral flank pain. No dysuria. No D/C. No frequency or urgency. No hesitancy.  MSK: No pain in joints or muscles. No calf pain or swelling.  Neuro: No HA or paresthesias. No focal weakness.  Psych: No SI, HI, AH, VH.  Endocrine: No polyuria or polydipsia. No heat or cold intolerance.  Heme: No easy bruising. No history of bleeding disorders or anemia.  See HPI for further details. All other systems are negative.     PAST MEDICAL HISTORY   has a past medical history of ASTHMA, Dental disorder, Pain, Psychiatric problem, and Rheumatoid arthritis(714.0) (2003).    SOCIAL HISTORY  Social History     Tobacco Use   • Smoking status:  Current Every Day Smoker     Packs/day: 1.00     Years: 30.00     Pack years: 30.00     Types: Cigarettes   • Smokeless tobacco: Never Used   • Tobacco comment: 1 ppd   Substance and Sexual Activity   • Alcohol use: No   • Drug use: No       SURGICAL HISTORY   has a past surgical history that includes gyn surgery; other (); nicholas by laparoscopy (2011); abdominal abscess drainage (2011); other abdominal surgery; tonsillectomy; wrist orif; appendectomy; cholecystectomy;  delivery only; toe fusion (Right, 2015); bone spur excision (2015); hammertoe correction (2015); foot reconstruction rheumatic (Left, 2015); arthrodesis (Right, 2016); pip arthrodesis (Right, 2016); bone graft (Right, 2016); irrigation & debridement ortho (Right, 2016); finger amputation (Right, 2016); finger arthroplasty (Right, 2017); finger arthroplasty (Right, 2017); and finger amputation (2017).    CURRENT MEDICATIONS  Current Outpatient Medications:   •  sulfamethoxazole-trimethoprim (BACTRIM DS) 800-160 MG tablet, Take 1 Tab by mouth every 12 hours for 7 days., Disp: 14 Tab, Rfl: 0  •  meloxicam (MOBIC) 15 MG tablet, Take 1 Tab by mouth every day for 30 days., Disp: 30 Tab, Rfl: 0  •  oxyCODONE-acetaminophen (PERCOCET) 5-325 MG Tab, Take 1 Tab by mouth every 6 hours as needed for Severe Pain for up to 30 days., Disp: 120 Tab, Rfl: 0  •  gabapentin (NEURONTIN) 800 MG tablet, Take 1 Tab by mouth 4 times a day for 90 days., Disp: 360 Tab, Rfl: 0  •  QUEtiapine (SEROQUEL) 100 MG Tab, Take 100 mg by mouth every day., Disp: , Rfl:   •  PARoxetine (PAXIL) 30 MG Tab, 60 mg., Disp: , Rfl:   •  Cyanocobalamin (VITAMIN B-12) 1000 MCG Tab, TAKE ONE TABLET BY MOUTH EVERY DAY FOR VITAMIN B12 SUPPLEMENTATION. TAKE WITH FOLIC ACID., Disp: , Rfl: 3  •  folic acid (FOLVITE) 400 MCG tablet, TAKE ONE TABLET BY MOUTH EVERY DAY FOR FOLIC ACID SUPPLEMENTATION. TAKE WITH VITAMIN B12, Disp:  ", Rfl: 3  •  AYR SALINE NASAL NO-DRIP Gel, USE 1 SPRAY IN EACH NOSTRIL 2 3 TIMES DAILY, Disp: , Rfl: 0  •  benzonatate (TESSALON) 100 MG Cap, TAKE 1 CAPSULE BY MOUTH UP TO 3X DAILY FOR COUGH, Disp: , Rfl: 2  •  alendronate (FOSAMAX) 70 MG Tab, , Disp: , Rfl:   •  pantoprazole (PROTONIX) 40 MG Tablet Delayed Response, , Disp: , Rfl:   •  Etanercept (ENBREL) 50 MG/ML Solution Prefilled Syringe, Inject  as instructed every 7 days., Disp: , Rfl:   •  albuterol (VENTOLIN OR PROVENTIL) 108 (90 BASE) MCG/ACT AERS inhalation aerosol, Inhale 2 Puffs by mouth as needed for Shortness of Breath., Disp: , Rfl:       ALLERGIES  Allergies   Allergen Reactions   • Penicillins Anaphylaxis and Hives     Tolerates cephalosporins; reports throat swelling with PCN   • Nitrous Oxide Vomiting   • Aripiprazole [Abilify] Nausea     Spasms, shaking       PHYSICAL EXAM  VITAL SIGNS: /70   Pulse 87   Temp 36.9 °C (98.4 °F) (Temporal)   Resp 16   Ht 1.6 m (5' 3\")   Wt 59 kg (130 lb)   LMP 09/21/2011   BMI 23.03 kg/m²    Pulse ox interpretation: I interpret this pulse ox as normal.  Genl: Female sitting in chair comfortably, speaking clearly, appears in mild distress, anxious.  Head: NC/AT   ENT: dry mucous membranes, posterior pharynx clear, uvula midline, nares patent bilaterally.  Eyes: Normal sclera, pupils equal round reactive to light  Neck: Supple, FROM, no LAD appreciated.  Pulmonary: Lungs are clear to auscultation bilaterally  Chest: No TTP  CV:  RRR, no murmur appreciated, pulses 2+ in both upper and lower extremities,  Abdomen: soft, Epigastric tenderness, no right upper quadrant tenderness, neg Barclay's, diffuse lower abdominal tenderness, positive guarding, no rebound, inconsistent McBurney's tenderness.  : Flank tenderness without CVA tenderness.  Musculoskeletal: Pain free ROM of the neck. Right upper extremity  Has distal finger amputatinons. left upper extremity  Has chronic contracture of the hand.   Neuro: " "A&Ox4 (person, place, time, situation), speech fluent, gait steady, no focal deficits appreciated, No cerebellar signs  Sensation is grossly intact in the distal upper and lower extremities  5/5 strength in  and dorsiflexion/plantar flexion of the ankles  Psych: Patient has an appropriate affect and behavior  Skin: No rash or lesions.  No pallor or jaundice.  No cyanosis.  Warm and dry.     DIAGNOSTIC STUDIES / PROCEDURES    LABS  Labs Reviewed   CBC WITH DIFFERENTIAL - Abnormal; Notable for the following components:       Result Value    WBC 21.0 (*)     RBC 3.73 (*)     .3 (*)     MCH 33.2 (*)     MCHC 32.8 (*)     RDW 57.1 (*)     Neutrophils-Polys 93.80 (*)     Lymphocytes 2.40 (*)     Immature Granulocytes 1.00 (*)     Neutrophils (Absolute) 19.68 (*)     Lymphs (Absolute) 0.51 (*)     Immature Granulocytes (abs) 0.20 (*)     All other components within normal limits   COMP METABOLIC PANEL - Abnormal; Notable for the following components:    Potassium 3.4 (*)     Co2 11 (*)     Anion Gap 12.0 (*)     Glucose 154 (*)     AST(SGOT) 11 (*)     Alkaline Phosphatase 121 (*)     All other components within normal limits   URINALYSIS,CULTURE IF INDICATED - Abnormal; Notable for the following components:    Ketones 15 (*)     Protein 100 (*)     All other components within normal limits   URINE MICROSCOPIC (W/UA) - Abnormal; Notable for the following components:    RBC 2-5 (*)     Hyaline Cast 3-5 (*)     All other components within normal limits   ESTIMATED GFR - Abnormal; Notable for the following components:    GFR If Non  58 (*)     All other components within normal limits   LIPASE   LACTIC ACID   DIAGNOSTIC ALCOHOL   BLOOD CULTURE    Narrative:     Per Hospital Policy: Only change Specimen Src: to \"Line\" if  specified by physician order.   BLOOD CULTURE    Narrative:     Per Hospital Policy: Only change Specimen Src: to \"Line\" if  specified by physician order.   LACTIC ACID "       RADIOLOGY   CT-ABDOMEN-PELVIS WITH   Final Result   Addendum 1 of 1   Addendum: There is focus of air seen adjacent to the second portion of the    duodenum, the duodenal wall appears slightly thickened. Consider duodenal    ulcer/perforation as etiology of these findings.      These findings were discussed with the patient's clinician, MARSHALL DEL VALLE,    on 10/4/2019 7:27 AM.      Final          COURSE & MEDICAL DECISION MAKING  Pertinent Labs & Imaging studies reviewed. (See chart for details)    Differential diagnoses include but not limited to: Colitis vs bowel obstruction vs. Bowel perforation vs gastritis vs hepatitis vs pancreatitis vs UTI vs pyelonephritis vs dehydration.    3:09 AM - Patient seen and examined at bedside. Patient made NPO for possible abdominal surgeries. Patient will be treated with NS infusion 1000 mL for NPO status. She will also be treated with Pepcid 20 mg and a GI cocktail. Ordered CBC w/ diff, CMP, lipase, lactic acid, and UA to evaluate her symptoms.     4:01 AM - Patient was reevaluated at bedside. Discussed elevated white blood cell count, and plan to order a CT.     Medical Decision Making:   Presents emergency room for evaluation of abdominal pain of approximately 24 hours duration.  On initial evaluation she had nonspecific abdominal pain and a large distribution that was not acutely peritoneal.  She did have guarding and I was concerned about the possibility of urogenital versus gastrointestinal infectious etiology.  The patient will remain n.p.o., IV hydration was initiated and initial Cipro and Flagyl started for empiric antibiosis.  She has very reassuring initial vital signs with no febrile illness and appears well-hydrated.    The patient had increasing pain demands and on reevaluation did appear to have worsening abdominal distention and rigidity.  She is sent for a CT abdomen pelvis with contrast which demonstrated multiple concerning findings.  This included some  free air in the abdominal and retroperitoneal space concerning for duodenal perforation versus soft tissue necrotizing fasciitis of the potential spaces.  There are no evidence of kidney abnormalities, or urine is not acutely infected and there is no evidence of stones and she has no history of prior urogenital infections.  The pancreas also has surrounding inflammatory findings on CT however her lipase is normal and there is no structures concerning for pseudocyst or other signs of chronic inflammatory process.    General surgery is consulted regarding the patient and asked to come and evaluate the patient at the bedside at approximately 7:15 AM.  Talk with Dr. Dumont on the phone who also asked that urology be concurrently paged.  Spoke to Dr. Palafox with urology at 0735am.  He does not see any acute structural abnormalities of the kidney and does not see indication that the free air or other findings on our likely of urogenital source.  Unlikely to be emphysematous cystitis.  Again discussed with gen surgeon who agrees to see pt and would like the pt admitted to medical ICU.    HYDRATION: Based on the patient's presentation of Dehydration and Other npo status and anticipated abdominal surgery the patient was given IV fluids. IV Hydration was used because oral hydration was contraindicated. Upon recheck following hydration, the patient was mildly improved.      CRITICAL CARE:  I saw and evaluated this patient.  I personally provided 50 minutes of critical care time to the patient excluding billable procedures and directly and personally provided the following treatment and critical care management:     Critical Care Interventions  Multispecialty coordination  Multiple bedside assessments, coordination of care with family and other historical sources  Continuous hemodynamic and respiratory monitoring  Serial abdominal exams  Serial airway observations  Fluid resuscitation  Antibiosis and chart review due to  allergies      FINAL IMPRESSION  Visit Diagnoses     ICD-10-CM   1. Generalized abdominal pain R10.84   2. Abdominal infection (HCC) K65.9   3. Sepsis without acute organ dysfunction, due to unspecified organism (HCC) A41.9   4. Dehydration E86.0   5. Perforated viscus R19.8   6. Retroperitoneal air K66.8     Madhu MOLINA (Scribe), am scribing for, and in the presence of, Petar Neri M.D..    Electronically signed by: Madhu Martinez (Scribe), 10/4/2019    IPetar M.D. personally performed the services described in this documentation, as scribed by Madhu Martinez in my presence, and it is both accurate and complete. C    The note accurately reflects work and decisions made by me.  Petar Neri  10/4/2019  7:30 AM

## 2019-10-04 NOTE — ED TRIAGE NOTES
Patient in by EMS, report from [facility]: patient woken from sleep with sever abdominal pain at roughly 1130p.    Patient given 100mcg Fentanyl, 4mg Zofran, 1,mg Versed and 100ml NS en route.    Pain at middle, upper abdomen; also LRQ.  Sharp, stabbing, burning. No N/V/D.      Patient also with severe anxiety.    Patient recently seen here at [facility] for infection at right hand - previous amputation of digits r/t RA.  Patient still taking antibiotics.

## 2019-10-04 NOTE — H&P
Hospital Medicine History & Physical Note    Date of Service  10/4/2019    Primary Care Physician  Fidencio Christopher D.O.    Consultants  Dr. Tim Ortiz    Code Status  full    Chief Complaint  Abdominal pain    History of Presenting Illness  49 y.o. female with past medical history of rheumatoid arthritis who presented 10/4/2019 with sudden onset of abdominal pain started around 11:30 PM last night.  She described the pain as stabbing pain over the epigastric area, 100/10 intensity, radiated to her right inguinal area and flank area, constant in nature.  Associated with chills.  Denies any nausea, vomiting or diarrhea.  Therefore she called 911 and she was brought to ER overnight.  In the ER she has a CT scan of the abdomen done and found to have gas and retroperitoneal area behind liver and around perinephric area.  Therefore surgery was consulted Dr. Chaudhari who initially thinking that it could be related to urology issue and recommended urology consult.  Dr. Barr was consulted but he did not think that it is related to urology pathology.  So Dr. Chaudhari was again notified about that.  She will be taken to surgery shortly.  In the meantime she was given empiric antibiotic in ER.  She will be admitted to ICU after surgery.    Review of Systems  Review of Systems   Constitutional: Positive for chills. Negative for fever and weight loss.   HENT: Negative for congestion and nosebleeds.    Eyes: Negative for blurred vision, pain, discharge and redness.   Respiratory: Negative for cough, sputum production, shortness of breath and stridor.    Cardiovascular: Negative for chest pain, palpitations and orthopnea.   Gastrointestinal: Positive for abdominal pain. Negative for diarrhea, heartburn, nausea and vomiting.   Genitourinary: Negative for dysuria, frequency and urgency.   Musculoskeletal: Negative for back pain, myalgias and neck pain.   Skin: Negative for itching and rash.   Neurological: Negative for  dizziness, focal weakness, seizures and headaches.   Psychiatric/Behavioral: Negative for depression. The patient is not nervous/anxious and does not have insomnia.        Past Medical History   has a past medical history of ASTHMA, Dental disorder, Pain, Psychiatric problem, and Rheumatoid arthritis(714.0) ().    Surgical History   has a past surgical history that includes gyn surgery; other (); nicholas by laparoscopy (2011); abdominal abscess drainage (2011); other abdominal surgery; tonsillectomy; wrist orif; appendectomy; cholecystectomy; pr  delivery only; toe fusion (Right, 2015); bone spur excision (2015); hammertoe correction (2015); foot reconstruction rheumatic (Left, 2015); arthrodesis (Right, 2016); pip arthrodesis (Right, 2016); bone graft (Right, 2016); irrigation & debridement ortho (Right, 2016); finger amputation (Right, 2016); finger arthroplasty (Right, 2017); finger arthroplasty (Right, 2017); and finger amputation (2017).     Family History  Reviewed and no pertinent family history    Social History   reports that she has been smoking cigarettes. She has a 30.00 pack-year smoking history. She has never used smokeless tobacco. She reports that she does not drink alcohol or use drugs.    Allergies  Allergies   Allergen Reactions   • Penicillins Anaphylaxis and Hives     Tolerates cephalosporins; reports throat swelling with PCN   • Nitrous Oxide Vomiting   • Aripiprazole [Abilify] Nausea     Spasms, shaking       Medications  Prior to Admission Medications   Prescriptions Last Dose Informant Patient Reported? Taking?   AYR SALINE NASAL NO-DRIP Gel   Yes No   Sig: USE 1 SPRAY IN EACH NOSTRIL 2 3 TIMES DAILY   Cyanocobalamin (VITAMIN B-12) 1000 MCG Tab   Yes No   Sig: TAKE ONE TABLET BY MOUTH EVERY DAY FOR VITAMIN B12 SUPPLEMENTATION. TAKE WITH FOLIC ACID.   Etanercept (ENBREL) 50 MG/ML Solution Prefilled Syringe   Yes  No   Sig: Inject  as instructed every 7 days.   PARoxetine (PAXIL) 30 MG Tab   Yes No   Si mg.   QUEtiapine (SEROQUEL) 100 MG Tab   Yes No   Sig: Take 100 mg by mouth every day.   albuterol (VENTOLIN OR PROVENTIL) 108 (90 BASE) MCG/ACT AERS inhalation aerosol  Patient Yes No   Sig: Inhale 2 Puffs by mouth as needed for Shortness of Breath.   alendronate (FOSAMAX) 70 MG Tab   Yes No   benzonatate (TESSALON) 100 MG Cap   Yes No   Sig: TAKE 1 CAPSULE BY MOUTH UP TO 3X DAILY FOR COUGH   gabapentin (NEURONTIN) 800 MG tablet   No No   Sig: Take 1 Tab by mouth 4 times a day for 90 days.   meloxicam (MOBIC) 15 MG tablet   No No   Sig: Take 1 Tab by mouth every day for 30 days.   oxyCODONE-acetaminophen (PERCOCET) 5-325 MG Tab   No No   Sig: Take 1 Tab by mouth every 6 hours as needed for Severe Pain for up to 30 days.   pantoprazole (PROTONIX) 40 MG Tablet Delayed Response   Yes No   sulfamethoxazole-trimethoprim (BACTRIM DS) 800-160 MG tablet   No No   Sig: Take 1 Tab by mouth every 12 hours for 7 days.      Facility-Administered Medications: None       Physical Exam  Temp:  [36.9 °C (98.4 °F)] 36.9 °C (98.4 °F)  Pulse:  [] 89  Resp:  [16-18] 18  BP: ()/(63-79) 102/68  SpO2:  [94 %-96 %] 96 %    Physical Exam   Constitutional: She is oriented to person, place, and time. No distress.   HENT:   Head: Normocephalic and atraumatic.   Mouth/Throat: Oropharynx is clear and moist.   Eyes: Pupils are equal, round, and reactive to light. Conjunctivae and EOM are normal.   Neck: Normal range of motion. Neck supple. No tracheal deviation present. No thyromegaly present.   Cardiovascular: Normal rate and regular rhythm.   No murmur heard.  Pulmonary/Chest: Effort normal and breath sounds normal. No respiratory distress. She has no wheezes.   Abdominal: Soft. Bowel sounds are normal. She exhibits no distension. There is tenderness.   Musculoskeletal: She exhibits no edema or tenderness.   Neurological: She is alert and  oriented to person, place, and time. No cranial nerve deficit.   Skin: Skin is warm and dry. She is not diaphoretic. No erythema.   Psychiatric: She has a normal mood and affect. Her behavior is normal. Thought content normal.   Nursing note and vitals reviewed.      Laboratory:  Recent Labs     10/04/19  0330   WBC 21.0*   RBC 3.73*   HEMOGLOBIN 12.4   HEMATOCRIT 37.8   .3*   MCH 33.2*   MCHC 32.8*   RDW 57.1*   PLATELETCT 279   MPV 11.1     Recent Labs     10/04/19  0330   SODIUM 135   POTASSIUM 3.4*   CHLORIDE 112   CO2 11*   GLUCOSE 154*   BUN 18   CREATININE 1.02   CALCIUM 9.5     Recent Labs     10/04/19  0330   ALTSGPT 7   ASTSGOT 11*   ALKPHOSPHAT 121*   TBILIRUBIN 0.3   LIPASE 48   GLUCOSE 154*         No results for input(s): NTPROBNP in the last 72 hours.      No results for input(s): TROPONINT in the last 72 hours.    Urinalysis:    Recent Labs     10/04/19  0335   SPECGRAVITY 1.030   GLUCOSEUR Negative   KETONES 15*   NITRITE Negative   LEUKESTERAS Negative   WBCURINE 0-2   RBCURINE 2-5*   BACTERIA Negative   EPITHELCELL Few        Imaging:  CT-ABDOMEN-PELVIS WITH   Final Result   Addendum 1 of 1   Addendum: There is focus of air seen adjacent to the second portion of the    duodenum, the duodenal wall appears slightly thickened. Consider duodenal    ulcer/perforation as etiology of these findings.      These findings were discussed with the patient's clinician, MARSHALL DEL VALLE,    on 10/4/2019 7:27 AM.      Final            Assessment/Plan:  I anticipate this patient will require at least two midnights for appropriate medical management, necessitating inpatient admission.    Acute abdomen- (present on admission)  Assessment & Plan  With an retroperitoneal  Please see CT scan as above in detail  Empirically started on IV antibiotic with ceftriaxone and metronidazole  Surgery has been consulted and plan to take her to OR soon  Pain control  N.p.o.  IV fluids    HASMUKH (acute kidney injury) (HCC)-  (present on admission)  Assessment & Plan  Likely prerenal  On IV fluid  Low CMP in the morning  Avoid nephrotoxic drugs    Hypokalemia- (present on admission)  Assessment & Plan  Replete as needed  Follow CMP in the morning    High anion gap metabolic acidosis- (present on admission)  Assessment & Plan  On aggressive IV fluid  Follow CMP in the morning  Lactic acid is normal    Rheumatoid arthritis (HCC)- (present on admission)  Assessment & Plan  History of  Continue outpatient medications    Tobacco dependence- (present on admission)  Assessment & Plan  Tobacco cessation education provided for more than 11 minutes, discussed options of nicotine patch, medical treatment with wellbutrin and chantix. Discussed the risks of smoking including increased risk of heart disease, stroke, cancer and COPD. Discussed the benefits of quitting smoking. Nicotine replacement protocol will be provided to the patient.    Leukocytosis- (present on admission)  Assessment & Plan  Related to above    Patient is critically ill with acute abdomen  The vital organ system that is affected is gastroenterology  If untreated there is a high chance of deterioration into multiorgan failure failure and eventually death.   The critical care that I am providing today is aggressive IV fluid, IV antibiotic with ceftriaxone and metronidazole, ordering additional medications and labs and imaging, consulting with surgery and urology.  The critical that has been undertaken is medically complex.   There has been no overlap in critical care time.   Critical Care Time not including procedures: 40 minutes  VTE prophylaxis: heparin

## 2019-10-04 NOTE — PROGRESS NOTES
Pt brought from PACU via gurney on monitor, attached to ICU monitor. VSS.  Chlorohexidine bath given.     Post procedure skin check    Midline abdominal incision with a wound vac, R hand missing digits 1-4, RLQ JG drain, heels dry but intact. Red blanching spot on R lower buttock. No other skin issues noted.  Mepilex placed on coccyx.

## 2019-10-04 NOTE — ANESTHESIA PROCEDURE NOTES
Airway  Date/Time: 10/4/2019 9:17 AM  Performed by: Alejandro Ashford M.D.  Authorized by: Alejandro Ashford M.D.     Location:  OR  Urgency:  Elective  Indications for Airway Management:  Anesthesia  Spontaneous Ventilation: absent    Sedation Level:  Deep  Preoxygenated: Yes    Patient Position:  Sniffing  Mask Difficulty Assessment:  0 - not attempted  Final Airway Type:  Endotracheal airway  Final Endotracheal Airway:  ETT  Cuffed: Yes    Technique Used for Successful ETT Placement:  Direct laryngoscopy  Devices/Methods Used in Placement:  Cricoid pressure  Insertion Site:  Oral  Blade Type:  Otilia  Laryngoscope Blade/Videolaryngoscope Blade Size:  3  ETT Size (mm):  7.0  Measured from:  Teeth  ETT to Teeth (cm):  22  Placement Verified by: auscultation and capnometry    Cormack-Lehane Classification:  Grade I - full view of glottis  Number of Attempts at Approach:  1

## 2019-10-04 NOTE — ANESTHESIA PROCEDURE NOTES
Peripheral Block  Date/Time: 10/4/2019 10:20 AM  Performed by: Alejandro Ashford M.D.  Authorized by: Alejandro Ashford M.D.     Patient Location:  OR  Start Time:  10/4/2019 10:20 AM  End Time:  10/4/2019 10:25 AM  Reason for Block: at surgeon's request and post-op pain management    patient identified, IV checked, site marked, risks and benefits discussed, surgical consent, monitors and equipment checked, pre-op evaluation and timeout performed    Patient Position:  Supine  Prep: ChloraPrep    Monitoring:  Heart rate, continuous pulse ox and cardiac monitor  Block Region:  Trunk  Trunk - Block Type:  Abdominal plane block - TAP block    Laterality:  Bilateral  Procedures: ultrasound guided  Image captured, interpreted and electronically stored.  Local Infiltration:  Lidocaine  Strength:  1 %  Dose:  3 ml  Block Type:  Single-shot  Needle Length:  100mm  Needle Gauge:  21 G  Needle Localization:  Ultrasound guidance  Injection Assessment:  Negative aspiration for heme, no paresthesia on injection, incremental injection and local visualized surrounding nerve on ultrasound  Evidence of intravascular injection: No     US Guided Transversus Abdominis Plane (TAP) Block   US probe placed at the anterior axillary line between the costal margin and iliac crest in an axial plane. External Oblique, Internal Oblique (IO) and Transversis Abdominis (TA) muscles identified.  Needle inserted anteromedial to probe in an in plane approach and advanced under direct visualization to TAP between IO and TA muscled.  After negative aspiration LA injected with ease and visualized spreading in TAP plane.            Wound infection

## 2019-10-04 NOTE — CARE PLAN
Problem: Pain Management  Goal: Pain level will decrease to patient's comfort goal  Outcome: PROGRESSING AS EXPECTED  Note:   Pain assessed using appropriate pain scale, non pharmacologic measures implemented, medicated PRN per MAR. Patient complains of mid abdominal pain rating 5/10. Patient states she take 5 mg oxycodone 4x/day at home for rheumatoid arthritis pain. Pt currently NPO, but has oxycodone ordered for when her diet can advance.      Problem: Communication  Goal: The ability to communicate needs accurately and effectively will improve  Outcome: PROGRESSING AS EXPECTED  Note:   Pt admitted to the ICU this afternoon, settled in room, placed on ICU monitor. VSS. Patient and family updated on plan of care for this shift. Family educated that patient will remain in the ICU overnight. Rounds are to take place tomorrow am at 0900. Family shown nutrition room and all questions answered and needs met at this time.

## 2019-10-04 NOTE — OP REPORT
DATE OF OPERATION: 10/4/2019     PREOPERATIVE DIAGNOSIS: Peritonitis, perforated viscus  POSTOPERATIVE DIAGNOSIS: Perforation posterior duodenum, pancreatitis, rectal peritoneal collection    PROCEDURE PERFORMED: Laparotomy repair of duodenal perforation, drain which peritoneal collection    SURGEON: James Dumont M.D.  Assistant Dr. Bolaños  ANESTHESIOLOGIST:   Anesthesiologist: Alejandro Ashford M.D.  ANESTHESIA: General endotracheal anesthesia.      INDICATIONS: The patient is a 49 y.o.-year-old female with peritonitis, findings of free air on CT scan of the abdomen she  is taken to the operating room for laparotomy.     FINDINGS: Perforation of the posterior duodenum, inflammation the pancreas, retroperitoneal collection    WOUND CLASSIFICATION: Class IV, Dirty, Infected.    SPECIMEN: None    ESTIMATED BLOOD LOSS: 30 mL.    PROCEDURE: Following informed consent, the patient was properly identified, taken to the operating room and placed in supine position where general endotracheal anesthesia was administered. Intravenous antibiotics were administered by the anesthesiologist in correct time interval. Sequential compression devices were employed. The abdomen was prepped and draped into a sterile field.     Midline incision was made sharply.  This was carried down the fascia with care.  Pressure elevated in size.    Retractors were placed.    The abdomen explored.  There is a retroperitoneal collection leading to perforation of the posterior duodenum.  And was thoroughly irrigated and aspirated.  Posterior defect of the duodenum was repaired with suture and soft tissue patch.  Fibrin glue was placed.  Tongue of omentum was created placed over the defect.  Sutures were secured.  Round ligament was mobilized and brought secondary cover the defect due to significant inflammatory change.  A drain was placed on with portion of the peritoneal collection.    Drain was brought out through a separate incision.   And was  copiously irrigated and aspirated.  Final inspection of revealed good hemostasis, patch in place and secure  Counts were correct  Fascia was closed with #1 looped PDS  Incision was irrigated  Skin was closed with staples  Incision wound VAC was placed   The patient tolerated the procedure well and there were no apparent complication. All sponge, needle, and instrument counts were correct on 2 separate occasions. She  was awakened, extubated, and transferred to the recovery room in satisfactory condition.   Family was updated  ____________________________________   James Dumont M.D.    DD: 10/4/2019  10:58 AM

## 2019-10-04 NOTE — ASSESSMENT & PLAN NOTE
Status post exploratory laparotomy and surgical repair 10/4-clinically doing well.  Decreased abdominal pain, tolerating liquids  Completed her antibiotic course  Blood cultures neg  Continue with Prilosec twice daily  GI soft dysphasia diet as tolerated  Pain control with PRN oxycodone  DC IV morphine  Hep-Lock IV fluids  cont's to have low grade temps however WBC coming down, no abd pain, tolerating po well

## 2019-10-04 NOTE — PROGRESS NOTES
Trauma / Surgical Daily Progress Note    Date of Service  10/4/2019    Chief Complaint  49 y.o. female admitted 10/4/2019 with duodenal perforation    Interval Events  Hospital day #1  OR earlier today for repair of perforation  Pt currently requires ICU care and hospital admission  Seen on rounds and discussed with multidisciplinary team  Physiologic derangements preclude floor transfer  Events and interventions include:  Integration of multiple data points and associated complex medical decisions   Ongoing post operative resuscitation  Pulmonary toilet  Pain control  Ongoing infusion of abx for gross contamination    Review of Systems  Review of Systems   Gastrointestinal: Positive for abdominal pain.   All other systems reviewed and are negative.       Vital Signs for last 24 hours  Temp:  [36.6 °C (97.9 °F)-37.2 °C (99 °F)] 36.6 °C (97.9 °F)  Pulse:  [] 115  Resp:  [16-27] 20  BP: ()/(63-79) 114/71  SpO2:  [90 %-100 %] 100 %    Hemodynamic parameters for last 24 hours       Respiratory Data     Respiration: 20, Pulse Oximetry: 100 %     Work Of Breathing / Effort: Shallow  RUL Breath Sounds: Clear, RML Breath Sounds: Clear, RLL Breath Sounds: Diminished, NIXON Breath Sounds: Clear, LLL Breath Sounds: Diminished    Physical Exam  Physical Exam   Constitutional: She is oriented to person, place, and time. She appears well-developed and well-nourished.   HENT:   Head: Normocephalic and atraumatic.   Eyes: Pupils are equal, round, and reactive to light. EOM are normal.   Ng in place with bile   Neck: Normal range of motion. Neck supple. No tracheal deviation present.   Cardiovascular: Regular rhythm and intact distal pulses. Tachycardia present.   Pulmonary/Chest: Effort normal and breath sounds normal.   Abdominal: Soft. She exhibits no distension.   Drain with sanguineous output-no bile   Genitourinary:   Genitourinary Comments: Sullivan in place   Musculoskeletal: Normal range of motion.   Neurological:  She is alert and oriented to person, place, and time. No cranial nerve deficit.   Skin: Skin is warm and dry. No erythema.   Psychiatric: She has a normal mood and affect. Her behavior is normal. Thought content normal.       Laboratory  Recent Results (from the past 24 hour(s))   CBC WITH DIFFERENTIAL    Collection Time: 10/04/19  3:30 AM   Result Value Ref Range    WBC 21.0 (H) 4.8 - 10.8 K/uL    RBC 3.73 (L) 4.20 - 5.40 M/uL    Hemoglobin 12.4 12.0 - 16.0 g/dL    Hematocrit 37.8 37.0 - 47.0 %    .3 (H) 81.4 - 97.8 fL    MCH 33.2 (H) 27.0 - 33.0 pg    MCHC 32.8 (L) 33.6 - 35.0 g/dL    RDW 57.1 (H) 35.9 - 50.0 fL    Platelet Count 279 164 - 446 K/uL    MPV 11.1 9.0 - 12.9 fL    Neutrophils-Polys 93.80 (H) 44.00 - 72.00 %    Lymphocytes 2.40 (L) 22.00 - 41.00 %    Monocytes 2.30 0.00 - 13.40 %    Eosinophils 0.10 0.00 - 6.90 %    Basophils 0.40 0.00 - 1.80 %    Immature Granulocytes 1.00 (H) 0.00 - 0.90 %    Nucleated RBC 0.00 /100 WBC    Neutrophils (Absolute) 19.68 (H) 2.00 - 7.15 K/uL    Lymphs (Absolute) 0.51 (L) 1.00 - 4.80 K/uL    Monos (Absolute) 0.49 0.00 - 0.85 K/uL    Eos (Absolute) 0.02 0.00 - 0.51 K/uL    Baso (Absolute) 0.08 0.00 - 0.12 K/uL    Immature Granulocytes (abs) 0.20 (H) 0.00 - 0.11 K/uL    NRBC (Absolute) 0.00 K/uL   COMP METABOLIC PANEL    Collection Time: 10/04/19  3:30 AM   Result Value Ref Range    Sodium 135 135 - 145 mmol/L    Potassium 3.4 (L) 3.6 - 5.5 mmol/L    Chloride 112 96 - 112 mmol/L    Co2 11 (L) 20 - 33 mmol/L    Anion Gap 12.0 (H) 0.0 - 11.9    Glucose 154 (H) 65 - 99 mg/dL    Bun 18 8 - 22 mg/dL    Creatinine 1.02 0.50 - 1.40 mg/dL    Calcium 9.5 8.5 - 10.5 mg/dL    AST(SGOT) 11 (L) 12 - 45 U/L    ALT(SGPT) 7 2 - 50 U/L    Alkaline Phosphatase 121 (H) 30 - 99 U/L    Total Bilirubin 0.3 0.1 - 1.5 mg/dL    Albumin 4.3 3.2 - 4.9 g/dL    Total Protein 7.8 6.0 - 8.2 g/dL    Globulin 3.5 1.9 - 3.5 g/dL    A-G Ratio 1.2 g/dL   LIPASE    Collection Time: 10/04/19  3:30 AM    Result Value Ref Range    Lipase 48 11 - 82 U/L   LACTIC ACID    Collection Time: 10/04/19  3:30 AM   Result Value Ref Range    Lactic Acid 1.7 0.5 - 2.0 mmol/L   DIAGNOSTIC ALCOHOL    Collection Time: 10/04/19  3:30 AM   Result Value Ref Range    Diagnostic Alcohol 0.00 0.00 g/dL   ESTIMATED GFR    Collection Time: 10/04/19  3:30 AM   Result Value Ref Range    GFR If African American >60 >60 mL/min/1.73 m 2    GFR If Non African American 58 (A) >60 mL/min/1.73 m 2   URINALYSIS CULTURE, IF INDICATED    Collection Time: 10/04/19  3:35 AM   Result Value Ref Range    Color Yellow     Character Clear     Specific Gravity 1.030 <1.035    Ph 6.0 5.0 - 8.0    Glucose Negative Negative mg/dL    Ketones 15 (A) Negative mg/dL    Protein 100 (A) Negative mg/dL    Bilirubin Negative Negative    Urobilinogen, Urine 1.0 Negative    Nitrite Negative Negative    Leukocyte Esterase Negative Negative    Occult Blood Negative Negative    Micro Urine Req Microscopic    URINE MICROSCOPIC (W/UA)    Collection Time: 10/04/19  3:35 AM   Result Value Ref Range    WBC 0-2 /hpf    RBC 2-5 (A) /hpf    Bacteria Negative None /hpf    Epithelial Cells Few /hpf    Hyaline Cast 3-5 (A) /lpf   LACTIC ACID    Collection Time: 10/04/19  7:42 AM   Result Value Ref Range    Lactic Acid 1.0 0.5 - 2.0 mmol/L       Fluids    Intake/Output Summary (Last 24 hours) at 10/4/2019 1530  Last data filed at 10/4/2019 1400  Gross per 24 hour   Intake 2147.5 ml   Output 500 ml   Net 1647.5 ml       Core Measures & Quality Metrics  Labs reviewed, Medications reviewed and Radiology images reviewed  Sullivan catheter: Critically Ill - Requiring Accurate Measurement of Urinary Output      DVT Prophylaxis: Heparin  DVT prophylaxis - mechanical: SCDs  Ulcer prophylaxis: Yes        NAPOLEON Score  ETOH Screening    Assessment/Plan  Duodenal perforation (HCC)- (present on admission)  Assessment & Plan  10/4 operative repair      HASMUKH (acute kidney injury) (HCC)- (present  on admission)  Assessment & Plan  Ongoing resuscitation  Follow parameters        Discussed patient condition with RN, RT and general surgery.

## 2019-10-04 NOTE — ANESTHESIA PREPROCEDURE EVALUATION
Relevant Problems   CARDIAC   (+) Rheumatoid aortitis         (+) HASMUKH (acute kidney injury) (HCC)   (+) Acute renal failure (HCC)      Other   (+) Arthritis, rheumatoid (HCC)   (+) Rheumatoid arthritis (HCC)       Physical Exam    Airway   Mallampati: II  TM distance: >3 FB  Neck ROM: full       Cardiovascular - normal exam  Rhythm: regular  Rate: normal  (-) murmur     Dental - normal exam  (+) upper dentures         Pulmonary - normal exam  Breath sounds clear to auscultation     Abdominal    Neurological - normal exam                 Anesthesia Plan    ASA 3- EMERGENT       Plan - general       Airway plan will be ETT        Induction: intravenous    Postoperative Plan: Postoperative administration of opioids is intended.    Pertinent diagnostic labs and testing reviewed    Informed Consent:    Anesthetic plan and risks discussed with patient.    Use of blood products discussed with: patient whom consented to blood products.

## 2019-10-04 NOTE — OR SURGEON
Immediate Post OP Note    PreOp Diagnosis: Peritonitis perforated viscus   PostOp Diagnosis: Perforation of posterior duodenum pancreatitis abscess    Procedure(s):  LAPAROTOMY, EXPLORATORY AND REPAIR OF DUODENAL PERFORATION - Wound Class: Dirty or Infected    Surgeon(s):  WENDY Owens M.D.    Anesthesiologist/Type of Anesthesia:  Anesthesiologist: Alejandro Ashford M.D./General    Surgical Staff:  Circulator: Junie Paniagua R.N.  Scrub Person: Darian Gayle    Specimens removed if any:  * No specimens in log *    Estimated Blood Loss: 30 cc    Findings: Refreshed in the posterior duodenum with inflammation, retroperitoneal collection    Complications: None        10/4/2019 10:56 AM James Dumont M.D.

## 2019-10-04 NOTE — ASSESSMENT & PLAN NOTE
Associate with duodenal perforation.  Blood cultures negative.  Afebrile.   Patient with cough, congestion with right-sided pleural effusion, ?  Infiltrate.  Follow clinically for signs of pneumonia, check procalcitonin level

## 2019-10-04 NOTE — PROGRESS NOTES
Dr. Dumont at bedside to assess patient. New orders received and implemented. Remain NPO, Ice chips okay.

## 2019-10-04 NOTE — ED NOTES
Med rec completed per pt at bedside  Allergies reviewed  Pt started a 7 day course of Bactrim on 9/29/19

## 2019-10-04 NOTE — ASSESSMENT & PLAN NOTE
History of with chronic joint pain.  PRN oxycodone, Tylenol  On Enbrel as outpt; long Hx of steroids but none in last few years  Outpatient rheumatology follow-up

## 2019-10-04 NOTE — CONSULTS
Surgery consult  10/4/2019        CC: Sepsis, peritonitis, free air abdomen on CT scan    HPI: Mary Martinez is a very pleasant 49 y.o. female.  Medication for rheumatoid arthritis.  She reports 1 day history of intense right upper quadrant pain.  Pain radiated to the right groin.  Pain rated flank.  She reports prior to the onset of pain feeling well no recent illnesses.  She reports pain is been progressively worse.  She reports no recent illnesses.  Reports associated fever no nausea vomiting diarrhea.  She received evaluation of the CT scan as below.  Report pain has become progressively worse while in the emergency department.  Pain has become generalized more intense.  Pain increased with movement.  Pain not improved with medication or rest    Past Medical History:   Diagnosis Date   • ASTHMA     inhalers prn   • Dental disorder     upper partial   • Pain     everywhere   • Psychiatric problem     anxiety   • Rheumatoid arthritis(714.0) 2003       Past Surgical History:   Procedure Laterality Date   • FINGER ARTHROPLASTY Right 6/5/2017    Procedure: FINGER ARTHROPLASTY - LONG, RING AND SMALL VOLAR PLATE;  Surgeon: Lobo Rosen M.D.;  Location: SURGERY SAME DAY [facility];  Service:    • FINGER AMPUTATION  6/5/2017    Procedure: FINGER AMPUTATION - LONG, RING AND SMALL AT THE PROXIMAL INTERPHALANGEAL JOINT;  Surgeon: Lobo Rosen M.D.;  Location: SURGERY SAME DAY [facility];  Service:    • FINGER ARTHROPLASTY Right 4/17/2017    Procedure: FINGER ARTHROPLASTY ;  Surgeon: Lobo Rosen M.D.;  Location: SURGERY SAME DAY [facility];  Service:    • FINGER AMPUTATION Right 9/14/2016    Procedure: FINGER AMPUTATION INDEX;  Surgeon: Lobo Rosen M.D.;  Location: SURGERY SAME DAY [facility];  Service:    • IRRIGATION & DEBRIDEMENT ORTHO Right 9/11/2016    Procedure: IRRIGATION & DEBRIDEMENT ORTHO - right index finger;  Surgeon: Madhu Rosen M.D.;  Location: SURGERY  [facility];  Service:    • PIP ARTHRODESIS Right 2016    Procedure: RE-DO INDEX FINGER PROXIMAL INTERPHALANGEAL ARTHRODESIS;  Surgeon: Lobo Rosen M.D.;  Location: SURGERY SAME DAY [facility];  Service:    • BONE GRAFT Right 2016    Procedure: BONE GRAFT - DISTAL RADIUS ;  Surgeon: Lobo Rosen M.D.;  Location: SURGERY SAME DAY [facility];  Service:    • ARTHRODESIS Right 2016    Procedure: ARTHRODESIS INDEX FINGER PROXIMAL INTERPHALANGEAL;  Surgeon: Lobo Rosen M.D.;  Location: SURGERY SAME DAY [facility];  Service:    • FOOT RECONSTRUCTION RHEUMATIC Left 2015    Procedure: FOOT RECONSTRUCTION RHEUMATIC;  Surgeon: Heriberto Alves M.D.;  Location: SURGERY [facility];  Service:    • TOE FUSION Right 2015    Procedure: TOE FUSION 1ST METATARSALPHALANGEAL JOINT;  Surgeon: Heriberto Alves M.D.;  Location: SURGERY [facility];  Service:    • BONE SPUR EXCISION  2015    Procedure: BONE SPUR EXCISION METATARSAL HEAD 2-3;  Surgeon: Heriberto Alves M.D.;  Location: SURGERY [facility];  Service:    • HAMMERTOE CORRECTION  2015    Procedure: HAMMERTOE CORRECTION AND SOFT TISSUE RE-ALINGMENT 2-3 ;  Surgeon: Heriberto Alves M.D.;  Location: SURGERY [facility];  Service:    • EMANUEL BY LAPAROSCOPY  2011    Performed by VERO WRIGHT at [facility]   • ABDOMINAL ABSCESS DRAINAGE  2011    Performed by VERO WRIGHT at [facility]   • OTHER  1982    tonsils   • APPENDECTOMY     • CHOLECYSTECTOMY     • GYN SURGERY      C section X 4   • OTHER ABDOMINAL SURGERY      small colon block   • PB  DELIVERY ONLY      x 4   • TONSILLECTOMY     • WRIST ORIF         Current Facility-Administered Medications   Medication Dose Route Frequency Provider Last Rate Last Dose   • famotidine (PEPCID) injection 20 mg  20 mg Intravenous BID Petar Neri M.D.   Stopped at 10/04/19 0600   • gabapentin (NEURONTIN)  capsule 800 mg  800 mg Oral 4X/DAY Perfecto Bernardo M.D.       • meloxicam (MOBIC) tablet 15 mg  15 mg Oral DAILY Perfecto Bernardo M.D.       • oxyCODONE-acetaminophen (PERCOCET) 5-325 MG per tablet 1 Tab  1 Tab Oral Q6HRS PRN Perfecto Bernardo M.D.       • pantoprazole (PROTONIX) EC tablet 40 mg  40 mg Oral DAILY Perfecto Bernardo M.D.       • PARoxetine (PAXIL) tablet 60 mg  60 mg Oral Q EVENING Perfecto Bernardo M.D.       • QUEtiapine (SEROQUEL) tablet 100 mg  100 mg Oral Q EVENING Perfecto Bernardo M.D.       • senna-docusate (PERICOLACE or SENOKOT S) 8.6-50 MG per tablet 2 Tab  2 Tab Oral BID Perfecto Bernardo M.D.        And   • polyethylene glycol/lytes (MIRALAX) PACKET 1 Packet  1 Packet Oral QDAY PRN Perfecto Bernardo M.D.        And   • magnesium hydroxide (MILK OF MAGNESIA) suspension 30 mL  30 mL Oral QDAY PRN Perfecto Bernardo M.D.        And   • bisacodyl (DULCOLAX) suppository 10 mg  10 mg Rectal QDAY PRN Perfecto Bernardo M.D.       • NS infusion   Intravenous Continuous Perfecto Bernardo M.D.       • heparin injection 5,000 Units  5,000 Units Subcutaneous Q8HRS Perfecto Bernardo M.D.       • acetaminophen (TYLENOL) tablet 650 mg  650 mg Oral Q6HRS PRN Perfecto Bernardo M.D.       • ondansetron (ZOFRAN) syringe/vial injection 4 mg  4 mg Intravenous Q4HRS PRN Perfecto Bernardo M.D.       • ondansetron (ZOFRAN ODT) dispertab 4 mg  4 mg Oral Q4HRS PRN Perfecto Bernardo M.D.       • promethazine (PHENERGAN) tablet 12.5-25 mg  12.5-25 mg Oral Q4HRS PRN Perfecto Bernardo M.D.       • promethazine (PHENERGAN) suppository 12.5-25 mg  12.5-25 mg Rectal Q4HRS PRN Perfecto Bernardo M.D.       • prochlorperazine (COMPAZINE) injection 5-10 mg  5-10 mg Intravenous Q4HRS PRN Perfecto Bernardo M.D.       • cefTRIAXone (ROCEPHIN) 2 g in  mL IVPB  2 g Intravenous Q24HRS Perfecto Bernardo M.D.       • metroNIDAZOLE (FLAGYL) IVPB 500 mg  500 mg Intravenous Q8HRS Perfecto Bernardo M.D.       • morphine (pf) 4 mg/ml injection 2 mg  2 mg Intravenous Q3HRS PRN Perfecto Bernardo M.D.         Current Outpatient Medications   Medication Sig  Dispense Refill   • sulfamethoxazole-trimethoprim (BACTRIM DS) 800-160 MG tablet Take 1 Tab by mouth every 12 hours for 7 days. 14 Tab 0   • meloxicam (MOBIC) 15 MG tablet Take 1 Tab by mouth every day for 30 days. 30 Tab 0   • oxyCODONE-acetaminophen (PERCOCET) 5-325 MG Tab Take 1 Tab by mouth every 6 hours as needed for Severe Pain for up to 30 days. 120 Tab 0   • gabapentin (NEURONTIN) 800 MG tablet Take 1 Tab by mouth 4 times a day for 90 days. 360 Tab 0   • QUEtiapine (SEROQUEL) 100 MG Tab Take 100 mg by mouth every evening.     • PARoxetine (PAXIL) 30 MG Tab Take 60 mg by mouth every evening.     • alendronate (FOSAMAX) 70 MG Tab Take 70 mg by mouth every Tuesday.     • pantoprazole (PROTONIX) 40 MG Tablet Delayed Response Take 40 mg by mouth every day.     • Etanercept (ENBREL) 50 MG/ML Solution Prefilled Syringe Inject 50 mg as instructed every Tuesday.     • albuterol (VENTOLIN OR PROVENTIL) 108 (90 BASE) MCG/ACT AERS inhalation aerosol Inhale 2 Puffs by mouth every four hours as needed for Shortness of Breath.         Social History     Socioeconomic History   • Marital status:      Spouse name: Not on file   • Number of children: Not on file   • Years of education: Not on file   • Highest education level: Not on file   Occupational History   • Not on file   Social Needs   • Financial resource strain: Not on file   • Food insecurity:     Worry: Not on file     Inability: Not on file   • Transportation needs:     Medical: Not on file     Non-medical: Not on file   Tobacco Use   • Smoking status: Current Every Day Smoker     Packs/day: 1.00     Years: 30.00     Pack years: 30.00     Types: Cigarettes   • Smokeless tobacco: Never Used   • Tobacco comment: 1 ppd   Substance and Sexual Activity   • Alcohol use: No   • Drug use: No   • Sexual activity: Not on file   Lifestyle   • Physical activity:     Days per week: Not on file     Minutes per session: Not on file   • Stress: Not on file   Relationships  "  • Social connections:     Talks on phone: Not on file     Gets together: Not on file     Attends [employer] service: Not on file     Active member of club or organization: Not on file     Attends meetings of clubs or organizations: Not on file     Relationship status: Not on file   • Intimate partner violence:     Fear of current or ex partner: Not on file     Emotionally abused: Not on file     Physically abused: Not on file     Forced sexual activity: Not on file   Other Topics Concern   •  Service No   • Blood Transfusions Yes   • Caffeine Concern No   • Occupational Exposure No   • Hobby Hazards No   • Sleep Concern No   • Stress Concern Yes   • Weight Concern No   • Special Diet No   • Back Care No   • Exercise Yes   • Bike Helmet Yes   • Seat Belt Yes   • Self-Exams Yes   Social History Narrative    ** Merged History Encounter **            History reviewed. No pertinent family history.    Allergies:  Penicillins; Nitrous oxide; and Aripiprazole [abilify]    Review of Systems:  Rheumatoid arthritis, asthma, 1 day history of intense abdominal pain as above otherwise review negative    Physical Exam:  /68   Pulse 89   Temp 36.9 °C (98.4 °F) (Temporal)   Resp 18   Ht 1.6 m (5' 3\")   Wt 59 kg (130 lb)   SpO2 96%     Constitutional: Awake, alert, oriented x3. No acute distress. GCS 15 E4 V5 M6.  Head: Normal cephalic atraumatic  Neck: No tracheal deviation. No stridor.  Cardiovascular: Normal rate, skin warm brisk capillary refill.  Pulmonary/Chest: No cough no respiratory distress  Abdominal: Firm, distended, tender.  Peritoneal signs   musculoskeletal: Warm dry.  Change in fingers in the right hand.  Neurological: Awake alert appropriate friendly cooperative GCS 15.  Skin: Skin is warm and dry.    Psychiatric:  Normal mood and affect.  Behavior is appropriate.       Labs:  Recent Labs     10/04/19  0330   WBC 21.0*   RBC 3.73*   HEMOGLOBIN 12.4   HEMATOCRIT 37.8   .3*   MCH 33.2* "   MCHC 32.8*   RDW 57.1*   PLATELETCT 279   MPV 11.1     Recent Labs     10/04/19  0330   SODIUM 135   POTASSIUM 3.4*   CHLORIDE 112   CO2 11*   GLUCOSE 154*   BUN 18   CREATININE 1.02   CALCIUM 9.5         Recent Labs     10/04/19  0330   ASTSGOT 11*   ALTSGPT 7   TBILIRUBIN 0.3   ALKPHOSPHAT 121*   GLOBULIN 3.5       Radiology:  CT-ABDOMEN-PELVIS WITH   Final Result   Addendum 1 of 1   Addendum: There is focus of air seen adjacent to the second portion of the    duodenum, the duodenal wall appears slightly thickened. Consider duodenal    ulcer/perforation as etiology of these findings.      These findings were discussed with the patient's clinician, MARSHALL DEL VALLE,    on 10/4/2019 7:27 AM.      Final            Assessment: This is a 49 y.o. Female.  Peritonitis free air on CT exam concerning for perforated duodenal ulcer as above     Plan:   Active Hospital Problems    Diagnosis   • Acute abdomen [R10.0]     Priority: High   • High anion gap metabolic acidosis [E87.2]   • Hypokalemia [E87.6]   • Rheumatoid arthritis (HCC) [M06.9]     Diagnois update 10/1/2016     • Tobacco dependence [F17.200]   • Leukocytosis [D72.829]     Discussed findings.  Discussed because of air inflammation not certain.  Discussed options and to include surgical exploration.  Discussed risk benefits alternatives.  We discussed included but not limited to bleeding requiring requiring blood/blood product transfusion,  Injury to bowel bladder blood vessel ureter other abdominal structures, pancreatitis, could identify source of perforation, need for further treatment, chronic pain  Discussed with patient and family, adult son  All questions answered discussed  Consent obtained  Plan for emergency laparotomy    James Dumont MD, FACS  [facility]   [phone]

## 2019-10-05 LAB
ALBUMIN SERPL BCP-MCNC: 3.5 G/DL (ref 3.2–4.9)
ALBUMIN/GLOB SERPL: 1.2 G/DL
ALP SERPL-CCNC: 104 U/L (ref 30–99)
ALT SERPL-CCNC: 16 U/L (ref 2–50)
ANION GAP SERPL CALC-SCNC: 11 MMOL/L (ref 0–11.9)
AST SERPL-CCNC: 33 U/L (ref 12–45)
BILIRUB SERPL-MCNC: 0.3 MG/DL (ref 0.1–1.5)
BUN SERPL-MCNC: 13 MG/DL (ref 8–22)
CALCIUM SERPL-MCNC: 8.5 MG/DL (ref 8.5–10.5)
CHLORIDE SERPL-SCNC: 113 MMOL/L (ref 96–112)
CO2 SERPL-SCNC: 14 MMOL/L (ref 20–33)
CREAT SERPL-MCNC: 0.82 MG/DL (ref 0.5–1.4)
ERYTHROCYTE [DISTWIDTH] IN BLOOD BY AUTOMATED COUNT: 60.9 FL (ref 35.9–50)
GLOBULIN SER CALC-MCNC: 3 G/DL (ref 1.9–3.5)
GLUCOSE SERPL-MCNC: 103 MG/DL (ref 65–99)
HCT VFR BLD AUTO: 32.6 % (ref 37–47)
HGB BLD-MCNC: 10.3 G/DL (ref 12–16)
MCH RBC QN AUTO: 33 PG (ref 27–33)
MCHC RBC AUTO-ENTMCNC: 31.6 G/DL (ref 33.6–35)
MCV RBC AUTO: 104.5 FL (ref 81.4–97.8)
PLATELET # BLD AUTO: 269 K/UL (ref 164–446)
PMV BLD AUTO: 11.1 FL (ref 9–12.9)
POTASSIUM SERPL-SCNC: 3.8 MMOL/L (ref 3.6–5.5)
PROT SERPL-MCNC: 6.5 G/DL (ref 6–8.2)
RBC # BLD AUTO: 3.12 M/UL (ref 4.2–5.4)
SODIUM SERPL-SCNC: 138 MMOL/L (ref 135–145)
WBC # BLD AUTO: 19.3 K/UL (ref 4.8–10.8)

## 2019-10-05 PROCEDURE — 80053 COMPREHEN METABOLIC PANEL: CPT

## 2019-10-05 PROCEDURE — 700101 HCHG RX REV CODE 250: Performed by: INTERNAL MEDICINE

## 2019-10-05 PROCEDURE — 3E02340 INTRODUCTION OF INFLUENZA VACCINE INTO MUSCLE, PERCUTANEOUS APPROACH: ICD-10-PCS | Performed by: SURGERY

## 2019-10-05 PROCEDURE — 90686 IIV4 VACC NO PRSV 0.5 ML IM: CPT | Performed by: SURGERY

## 2019-10-05 PROCEDURE — 700105 HCHG RX REV CODE 258: Performed by: INTERNAL MEDICINE

## 2019-10-05 PROCEDURE — 99232 SBSQ HOSP IP/OBS MODERATE 35: CPT | Performed by: SURGERY

## 2019-10-05 PROCEDURE — 700111 HCHG RX REV CODE 636 W/ 250 OVERRIDE (IP): Performed by: SURGERY

## 2019-10-05 PROCEDURE — 700111 HCHG RX REV CODE 636 W/ 250 OVERRIDE (IP): Performed by: INTERNAL MEDICINE

## 2019-10-05 PROCEDURE — 770001 HCHG ROOM/CARE - MED/SURG/GYN PRIV*

## 2019-10-05 PROCEDURE — 90471 IMMUNIZATION ADMIN: CPT

## 2019-10-05 PROCEDURE — 700111 HCHG RX REV CODE 636 W/ 250 OVERRIDE (IP): Performed by: EMERGENCY MEDICINE

## 2019-10-05 PROCEDURE — 85027 COMPLETE CBC AUTOMATED: CPT

## 2019-10-05 RX ORDER — SODIUM CHLORIDE 9 MG/ML
INJECTION, SOLUTION INTRAVENOUS
Status: ACTIVE
Start: 2019-10-05 | End: 2019-10-06

## 2019-10-05 RX ADMIN — INFLUENZA A VIRUS A/BRISBANE/02/2018 IVR-190 (H1N1) ANTIGEN (FORMALDEHYDE INACTIVATED), INFLUENZA A VIRUS A/KANSAS/14/2017 X-327 (H3N2) ANTIGEN (FORMALDEHYDE INACTIVATED), INFLUENZA B VIRUS B/PHUKET/3073/2013 ANTIGEN (FORMALDEHYDE INACTIVATED), AND INFLUENZA B VIRUS B/MARYLAND/15/2016 BX-69A ANTIGEN (FORMALDEHYDE INACTIVATED) 0.5 ML: 15; 15; 15; 15 INJECTION, SUSPENSION INTRAMUSCULAR at 05:11

## 2019-10-05 RX ADMIN — METRONIDAZOLE 500 MG: 500 INJECTION, SOLUTION INTRAVENOUS at 22:17

## 2019-10-05 RX ADMIN — METRONIDAZOLE 500 MG: 500 INJECTION, SOLUTION INTRAVENOUS at 05:10

## 2019-10-05 RX ADMIN — HEPARIN SODIUM 5000 UNITS: 5000 INJECTION, SOLUTION INTRAVENOUS; SUBCUTANEOUS at 05:11

## 2019-10-05 RX ADMIN — HEPARIN SODIUM 5000 UNITS: 5000 INJECTION, SOLUTION INTRAVENOUS; SUBCUTANEOUS at 13:08

## 2019-10-05 RX ADMIN — METRONIDAZOLE 500 MG: 500 INJECTION, SOLUTION INTRAVENOUS at 13:08

## 2019-10-05 RX ADMIN — SODIUM CHLORIDE: 9 INJECTION, SOLUTION INTRAVENOUS at 17:38

## 2019-10-05 RX ADMIN — CEFTRIAXONE SODIUM 2 G: 2 INJECTION, POWDER, FOR SOLUTION INTRAMUSCULAR; INTRAVENOUS at 05:10

## 2019-10-05 RX ADMIN — HEPARIN SODIUM 5000 UNITS: 5000 INJECTION, SOLUTION INTRAVENOUS; SUBCUTANEOUS at 22:17

## 2019-10-05 RX ADMIN — FAMOTIDINE 20 MG: 10 INJECTION INTRAVENOUS at 05:11

## 2019-10-05 ASSESSMENT — ENCOUNTER SYMPTOMS
MYALGIAS: 1
VOMITING: 0
SHORTNESS OF BREATH: 0
FEVER: 0
ABDOMINAL PAIN: 1
CHILLS: 0
FOCAL WEAKNESS: 0
NAUSEA: 0

## 2019-10-05 NOTE — CARE PLAN
Problem: Pain Management  Goal: Pain level will decrease to patient's comfort goal  Note:   Pt medicated per MAR. Non-pharmacological interventions per flowsheets.     Problem: Safety  Goal: Will remain free from falls  Note:   Pt assessed for risk to fall at beginning of shift, appropriate interventions in place per flowsheets. Pt given call light and educated on use, calls appropriately. Pt educated on need for bed alarm, bed alarm on.

## 2019-10-05 NOTE — PROGRESS NOTES
10/4 repair posterior duodenal perforation  Extensive retroperitoneal contamination   Inflammation pancreas    Mary Martinez is awake alert and appropriate  She report feeling improved  She staes pain control adequate    On exam awake alert appropriate  breathing with ease no cough  Abdomen soft   Appropriate tenderness    Recovery well  POD1 posterior duodenal perforation repair  Follow up ICU team evaluation and rec  Pain control  Pulmonary hygiene  Ng decompression LIWS

## 2019-10-05 NOTE — PROGRESS NOTES
Trauma / Surgical Daily Progress Note    Date of Service  10/5/2019    Chief Complaint  49 y.o. female admitted 10/4/2019 with Retroperitoneal air    Interval Events    PO day # 1 laparotomy with repair of duodenal perforation.   Adequate pain control with PCA  Antibiotic therapy, yes.    - Continue nasogastric tube   - Clinically stable at this time, transfer to [facility]   - Counseled      Review of Systems  Review of Systems   Constitutional: Negative for chills and fever.   Respiratory: Negative for shortness of breath.    Cardiovascular: Negative for chest pain.   Gastrointestinal: Positive for abdominal pain. Negative for nausea and vomiting.        Small amount of flatus   Genitourinary: Negative for dysuria.   Musculoskeletal: Positive for myalgias.   Neurological: Negative for focal weakness.        Vital Signs  Temp:  [35.6 °C (96 °F)-37 °C (98.6 °F)] 36.4 °C (97.5 °F)  Pulse:  [] 101  Resp:  [15-47] 15  BP: (107-132)/(71-83) 107/78  SpO2:  [91 %-100 %] 92 %    Physical Exam  Physical Exam   Constitutional: She appears well-developed. No distress.   HENT:   Head: Normocephalic.   Nasogastric tube    Eyes: Conjunctivae are normal.   Neck: No JVD present.   Cardiovascular: Tachycardia present.   Pulmonary/Chest: No respiratory distress. She exhibits no tenderness.   Abdominal: She exhibits no distension. There is tenderness. There is no rebound and no guarding.   Prevena wound vac.   Drain serosanguinous      Musculoskeletal: Normal range of motion.   Neurological: She is alert.   Skin: Skin is warm and dry.   Nursing note and vitals reviewed.      Laboratory  Recent Results (from the past 24 hour(s))   CBC without Differential    Collection Time: 10/05/19  5:00 AM   Result Value Ref Range    WBC 19.3 (H) 4.8 - 10.8 K/uL    RBC 3.12 (L) 4.20 - 5.40 M/uL    Hemoglobin 10.3 (L) 12.0 - 16.0 g/dL    Hematocrit 32.6 (L) 37.0 - 47.0 %    .5 (H) 81.4 - 97.8 fL    MCH 33.0 27.0 - 33.0 pg     MCHC 31.6 (L) 33.6 - 35.0 g/dL    RDW 60.9 (H) 35.9 - 50.0 fL    Platelet Count 269 164 - 446 K/uL    MPV 11.1 9.0 - 12.9 fL   Comp Metabolic Panel (CMP)    Collection Time: 10/05/19  5:00 AM   Result Value Ref Range    Sodium 138 135 - 145 mmol/L    Potassium 3.8 3.6 - 5.5 mmol/L    Chloride 113 (H) 96 - 112 mmol/L    Co2 14 (L) 20 - 33 mmol/L    Anion Gap 11.0 0.0 - 11.9    Glucose 103 (H) 65 - 99 mg/dL    Bun 13 8 - 22 mg/dL    Creatinine 0.82 0.50 - 1.40 mg/dL    Calcium 8.5 8.5 - 10.5 mg/dL    AST(SGOT) 33 12 - 45 U/L    ALT(SGPT) 16 2 - 50 U/L    Alkaline Phosphatase 104 (H) 30 - 99 U/L    Total Bilirubin 0.3 0.1 - 1.5 mg/dL    Albumin 3.5 3.2 - 4.9 g/dL    Total Protein 6.5 6.0 - 8.2 g/dL    Globulin 3.0 1.9 - 3.5 g/dL    A-G Ratio 1.2 g/dL   ESTIMATED GFR    Collection Time: 10/05/19  5:00 AM   Result Value Ref Range    GFR If African American >60 >60 mL/min/1.73 m 2    GFR If Non African American >60 >60 mL/min/1.73 m 2       Fluids    Intake/Output Summary (Last 24 hours) at 10/5/2019 1404  Last data filed at 10/5/2019 1200  Gross per 24 hour   Intake 3318.3 ml   Output 2880 ml   Net 438.3 ml       Core Measures & Quality Metrics  Labs reviewed, Medications reviewed and Radiology images reviewed  Sullivan catheter: No Sullivan      DVT Prophylaxis: Heparin  DVT prophylaxis - mechanical: SCDs  Ulcer prophylaxis: Yes        Total Score: 0    ETOH Screening    Assessment/Plan  Duodenal perforation (HCC)- (present on admission)  Assessment & Plan  Soft tissue gas in the perinephric space of the right kidney tracking into the retroperitoneum posterior to the liver with small quantity of free fluid in the perinephric space  10/4  Laparotomy repair of duodenal perforation, drain which peritoneal collection  Nasogastric tube decompression   James Dumont MD. General Surgery     HASMUKH (acute kidney injury) (HCC)- (present on admission)  Assessment & Plan  Ongoing resuscitation  Follow parameters        Discussed patient  condition with Patient and general surgery, Dr. Christopher Redding.    I saw and evaluated the patient and discussed her management with the trauma APRN, Edwin Winters. I reviewed the APRNs note and agree with the documented findings and plan of care.   On my exam her ng is in place, her drain has serosanguineous output,her lungs are clear,  and her dressings are clean. Her pain is well controlled and she is safe for transfer to the floor.    Christopher Redding MD

## 2019-10-05 NOTE — ANESTHESIA POSTPROCEDURE EVALUATION
Patient: Mary Martinez    Procedure Summary     Date:  10/04/19 Room / Location:  [place] [address] / SURGERY [facility]    Anesthesia Start:  0911 Anesthesia Stop:  1041    Procedure:  LAPAROTOMY, EXPLORATORY AND REPAIR OF DUODENAL PERFORATION (N/A Abdomen) Diagnosis:  (DUODENAL PERFORATION WITH RETROPERITONEAL ABSCESS)    Surgeon:  James Dumont M.D. Responsible Provider:  Alejandro Ashford M.D.    Anesthesia Type:  general ASA Status:  3 - Emergent          Final Anesthesia Type: general  Last vitals  BP   Blood Pressure: 117/76    Temp   36.4 °C (97.5 °F)    Pulse   Pulse: (!) 105   Resp   (!) 24    SpO2   94 %      Anesthesia Post Evaluation    Patient location during evaluation: PACU  Patient participation: complete - patient participated  Level of consciousness: awake and alert    Airway patency: patent  Anesthetic complications: no  Cardiovascular status: hemodynamically stable  Respiratory status: acceptable  Hydration status: euvolemic    PONV: none           Nurse Pain Score: 6 (NPRS)

## 2019-10-05 NOTE — ANESTHESIA QCDR
2019 [facility] Clinical Data Registry (for Quality Improvement)     Postoperative nausea/vomiting risk protocol (Adult = 18 yrs and Pediatric 3-17 yrs)- (430 and 463)  General inhalation anesthetic (NOT TIVA) with PONV risk factors: Yes  Provision of anti-emetic therapy with at least 2 different classes of agents: Yes   Patient DID NOT receive anti-emetic therapy and reason is documented in Medical Record:  N/A    Multimodal Pain Management- (AQI59)  Patient undergoing Elective Surgery (i.e. Outpatient, or ASC, or Prescheduled Surgery prior to Hospital Admission): No  Use of Multimodal Pain Management, two or more drugs and/or interventions, NOT including systemic opioids: N/A  Exception: Documented allergy to multiple classes of analgesics: N/A    PACU assessment of acute postoperative pain prior to Anesthesia Care End- Applies to Patients Age = 18- (ABG7)  Initial PACU pain score is which of the following: < 7/10  Patient unable to report pain score: N/A    Post-anesthetic transfer of care checklist/protocol to PACU/ICU- (426 and 427)  Upon conclusion of case, patient transferred to which of the following locations: PACU/Non-ICU  Use of transfer checklist/protocol: Yes  Exclusion: Service Performed in Patient Hospital Room (and thus did not require transfer): N/A    PACU Reintubation- (AQI31)  General anesthesia requiring endotracheal intubation (ETT) along with subsequent extubation in OR or PACU: Yes  Required reintubation in the PACU: No   Extubation was a planned trial documented in the medical record prior to removal of the original airway device:  N/A    Unplanned admission to ICU related to anesthesia service up through end of PACU care- (MD51)  Unplanned admission to ICU (not initially anticipated at anesthesia start time): No

## 2019-10-06 PROBLEM — R49.0 HOARSENESS: Status: ACTIVE | Noted: 2019-10-06

## 2019-10-06 LAB
ALBUMIN SERPL BCP-MCNC: 3.3 G/DL (ref 3.2–4.9)
ALBUMIN/GLOB SERPL: 1.1 G/DL
ALP SERPL-CCNC: 89 U/L (ref 30–99)
ALT SERPL-CCNC: 19 U/L (ref 2–50)
ANION GAP SERPL CALC-SCNC: 13 MMOL/L (ref 0–11.9)
APTT PPP: 39.4 SEC (ref 24.7–36)
AST SERPL-CCNC: 33 U/L (ref 12–45)
BASOPHILS # BLD AUTO: 0.2 % (ref 0–1.8)
BASOPHILS # BLD AUTO: 0.4 % (ref 0–1.8)
BASOPHILS # BLD: 0.04 K/UL (ref 0–0.12)
BASOPHILS # BLD: 0.08 K/UL (ref 0–0.12)
BILIRUB SERPL-MCNC: 0.3 MG/DL (ref 0.1–1.5)
BUN SERPL-MCNC: 11 MG/DL (ref 8–22)
CALCIUM SERPL-MCNC: 8.7 MG/DL (ref 8.5–10.5)
CHLORIDE SERPL-SCNC: 112 MMOL/L (ref 96–112)
CO2 SERPL-SCNC: 13 MMOL/L (ref 20–33)
CREAT SERPL-MCNC: 0.7 MG/DL (ref 0.5–1.4)
EOSINOPHIL # BLD AUTO: 0.02 K/UL (ref 0–0.51)
EOSINOPHIL # BLD AUTO: 0.03 K/UL (ref 0–0.51)
EOSINOPHIL NFR BLD: 0.1 % (ref 0–6.9)
EOSINOPHIL NFR BLD: 0.2 % (ref 0–6.9)
ERYTHROCYTE [DISTWIDTH] IN BLOOD BY AUTOMATED COUNT: 61.6 FL (ref 35.9–50)
ERYTHROCYTE [DISTWIDTH] IN BLOOD BY AUTOMATED COUNT: 63 FL (ref 35.9–50)
GLOBULIN SER CALC-MCNC: 3.1 G/DL (ref 1.9–3.5)
GLUCOSE SERPL-MCNC: 94 MG/DL (ref 65–99)
HCT VFR BLD AUTO: 29.8 % (ref 37–47)
HCT VFR BLD AUTO: 30 % (ref 37–47)
HGB BLD-MCNC: 9.4 G/DL (ref 12–16)
HGB BLD-MCNC: 9.6 G/DL (ref 12–16)
IMM GRANULOCYTES # BLD AUTO: 0.14 K/UL (ref 0–0.11)
IMM GRANULOCYTES # BLD AUTO: 0.18 K/UL (ref 0–0.11)
IMM GRANULOCYTES NFR BLD AUTO: 0.7 % (ref 0–0.9)
IMM GRANULOCYTES NFR BLD AUTO: 1 % (ref 0–0.9)
INR PPP: 1.36 (ref 0.87–1.13)
LYMPHOCYTES # BLD AUTO: 0.75 K/UL (ref 1–4.8)
LYMPHOCYTES # BLD AUTO: 0.86 K/UL (ref 1–4.8)
LYMPHOCYTES NFR BLD: 4 % (ref 22–41)
LYMPHOCYTES NFR BLD: 4.6 % (ref 22–41)
MCH RBC QN AUTO: 33 PG (ref 27–33)
MCH RBC QN AUTO: 34.3 PG (ref 27–33)
MCHC RBC AUTO-ENTMCNC: 31.5 G/DL (ref 33.6–35)
MCHC RBC AUTO-ENTMCNC: 32 G/DL (ref 33.6–35)
MCV RBC AUTO: 104.6 FL (ref 81.4–97.8)
MCV RBC AUTO: 107.1 FL (ref 81.4–97.8)
MONOCYTES # BLD AUTO: 0.89 K/UL (ref 0–0.85)
MONOCYTES # BLD AUTO: 0.92 K/UL (ref 0–0.85)
MONOCYTES NFR BLD AUTO: 4.8 % (ref 0–13.4)
MONOCYTES NFR BLD AUTO: 4.9 % (ref 0–13.4)
NEUTROPHILS # BLD AUTO: 16.77 K/UL (ref 2–7.15)
NEUTROPHILS # BLD AUTO: 16.88 K/UL (ref 2–7.15)
NEUTROPHILS NFR BLD: 89.3 % (ref 44–72)
NEUTROPHILS NFR BLD: 89.8 % (ref 44–72)
NRBC # BLD AUTO: 0 K/UL
NRBC # BLD AUTO: 0 K/UL
NRBC BLD-RTO: 0 /100 WBC
NRBC BLD-RTO: 0 /100 WBC
PLATELET # BLD AUTO: 274 K/UL (ref 164–446)
PLATELET # BLD AUTO: 281 K/UL (ref 164–446)
PMV BLD AUTO: 10.2 FL (ref 9–12.9)
PMV BLD AUTO: 10.3 FL (ref 9–12.9)
POTASSIUM SERPL-SCNC: 3.4 MMOL/L (ref 3.6–5.5)
PROT SERPL-MCNC: 6.4 G/DL (ref 6–8.2)
PROTHROMBIN TIME: 17.2 SEC (ref 12–14.6)
RBC # BLD AUTO: 2.8 M/UL (ref 4.2–5.4)
RBC # BLD AUTO: 2.85 M/UL (ref 4.2–5.4)
SODIUM SERPL-SCNC: 138 MMOL/L (ref 135–145)
WBC # BLD AUTO: 18.7 K/UL (ref 4.8–10.8)
WBC # BLD AUTO: 18.9 K/UL (ref 4.8–10.8)

## 2019-10-06 PROCEDURE — 700111 HCHG RX REV CODE 636 W/ 250 OVERRIDE (IP): Performed by: SURGERY

## 2019-10-06 PROCEDURE — 85025 COMPLETE CBC W/AUTO DIFF WBC: CPT

## 2019-10-06 PROCEDURE — 80053 COMPREHEN METABOLIC PANEL: CPT

## 2019-10-06 PROCEDURE — 85610 PROTHROMBIN TIME: CPT

## 2019-10-06 PROCEDURE — 700111 HCHG RX REV CODE 636 W/ 250 OVERRIDE (IP): Performed by: INTERNAL MEDICINE

## 2019-10-06 PROCEDURE — 700105 HCHG RX REV CODE 258: Performed by: INTERNAL MEDICINE

## 2019-10-06 PROCEDURE — 302252 SYSTEM PREVENA CANISTER 45ML DISP VAC: Performed by: SURGERY

## 2019-10-06 PROCEDURE — 99233 SBSQ HOSP IP/OBS HIGH 50: CPT | Performed by: INTERNAL MEDICINE

## 2019-10-06 PROCEDURE — 770001 HCHG ROOM/CARE - MED/SURG/GYN PRIV*

## 2019-10-06 PROCEDURE — 700101 HCHG RX REV CODE 250: Performed by: INTERNAL MEDICINE

## 2019-10-06 PROCEDURE — 36415 COLL VENOUS BLD VENIPUNCTURE: CPT

## 2019-10-06 PROCEDURE — 85730 THROMBOPLASTIN TIME PARTIAL: CPT

## 2019-10-06 RX ORDER — SODIUM CHLORIDE 9 MG/ML
INJECTION, SOLUTION INTRAVENOUS
Status: DISCONTINUED
Start: 2019-10-06 | End: 2019-10-06

## 2019-10-06 RX ORDER — DEXTROSE MONOHYDRATE, SODIUM CHLORIDE, SODIUM LACTATE, POTASSIUM CHLORIDE, CALCIUM CHLORIDE 5; 600; 310; 179; 20 G/100ML; MG/100ML; MG/100ML; MG/100ML; MG/100ML
INJECTION, SOLUTION INTRAVENOUS CONTINUOUS
Status: DISCONTINUED | OUTPATIENT
Start: 2019-10-06 | End: 2019-10-10

## 2019-10-06 RX ADMIN — SODIUM CHLORIDE: 9 INJECTION, SOLUTION INTRAVENOUS at 09:10

## 2019-10-06 RX ADMIN — METRONIDAZOLE 500 MG: 500 INJECTION, SOLUTION INTRAVENOUS at 14:07

## 2019-10-06 RX ADMIN — ONDANSETRON 4 MG: 2 INJECTION INTRAMUSCULAR; INTRAVENOUS at 14:23

## 2019-10-06 RX ADMIN — CEFTRIAXONE SODIUM 2 G: 2 INJECTION, POWDER, FOR SOLUTION INTRAMUSCULAR; INTRAVENOUS at 06:07

## 2019-10-06 RX ADMIN — HEPARIN SODIUM 5000 UNITS: 5000 INJECTION, SOLUTION INTRAVENOUS; SUBCUTANEOUS at 14:07

## 2019-10-06 RX ADMIN — HEPARIN SODIUM 5000 UNITS: 5000 INJECTION, SOLUTION INTRAVENOUS; SUBCUTANEOUS at 21:38

## 2019-10-06 RX ADMIN — HEPARIN SODIUM 5000 UNITS: 5000 INJECTION, SOLUTION INTRAVENOUS; SUBCUTANEOUS at 06:11

## 2019-10-06 RX ADMIN — SODIUM CHLORIDE: 9 INJECTION, SOLUTION INTRAVENOUS at 01:24

## 2019-10-06 RX ADMIN — Medication: at 18:01

## 2019-10-06 RX ADMIN — METRONIDAZOLE 500 MG: 500 INJECTION, SOLUTION INTRAVENOUS at 21:38

## 2019-10-06 RX ADMIN — DEXTROSE MONOHYDRATE, SODIUM CHLORIDE, SODIUM LACTATE, POTASSIUM CHLORIDE, CALCIUM CHLORIDE: 5; 600; 310; 179; 20 INJECTION, SOLUTION INTRAVENOUS at 14:07

## 2019-10-06 RX ADMIN — METRONIDAZOLE 500 MG: 500 INJECTION, SOLUTION INTRAVENOUS at 06:11

## 2019-10-06 ASSESSMENT — ENCOUNTER SYMPTOMS
NAUSEA: 0
PND: 0
VOMITING: 0
BLOOD IN STOOL: 0
CONSTIPATION: 1
CHILLS: 0
DIZZINESS: 0
COUGH: 0
BLURRED VISION: 0
DOUBLE VISION: 0
HEADACHES: 0
HEMOPTYSIS: 0
PALPITATIONS: 0
SHORTNESS OF BREATH: 0
ABDOMINAL PAIN: 0
FEVER: 0
DIARRHEA: 0

## 2019-10-06 NOTE — CARE PLAN
Problem: Pain Management  Goal: Pain level will decrease to patient's comfort goal  Outcome: PROGRESSING AS EXPECTED  Note:   PCA in use with positive effect.     Problem: Bowel/Gastric:  Goal: Normal bowel function is maintained or improved  Outcome: PROGRESSING AS EXPECTED  Note:   Encouraged ambulation to promote bowel motility. Pt is passing gas but LBM PTA. Hypoactive bowel sounds present.  NG tube in place. Pt started complaining of nausea, xray to reconfirm tube placement.

## 2019-10-06 NOTE — CARE PLAN
Problem: Pain Management  Goal: Pain level will decrease to patient's comfort goal  Outcome: PROGRESSING AS EXPECTED   Pain is well controlled with medication per MAR  Problem: Communication  Goal: The ability to communicate needs accurately and effectively will improve  Outcome: PROGRESSING AS EXPECTED   Participation in POC  Problem: Safety  Goal: Will remain free from injury  Outcome: PROGRESSING AS EXPECTED   Fall precautions in place

## 2019-10-06 NOTE — PROGRESS NOTES
Pt A&Ox4.  Pt appears to have a whispering voice, pt states she is trying to speak loudly but hasn't been able to speak past a whisper since surgery. Pt describes slightly hard time swallowing, pt reports she thinks it may be due to the NG tube.   States pain 7/10, PCA in use.   Prevena to midline incision, sanguinous output present and canister full 45 ml. Changed canister. Abdomen soft, non distended. JG drain x1, serous output.  NG with clear/pink output.  Denies nausea. + bowl sounds, states starting to pass flatus.   NPO with ice chips, tolerating well. LBM PTA.   Saturating >90% on RA.  Pt ambulates SBA, steady gait.  Updated on plan of care. Safety education provided. Bed locked in low. Call light within reach. Rounding in place.

## 2019-10-06 NOTE — PROGRESS NOTES
"SURGERY PROGRESS NOTE    /79   Pulse 92   Temp 36.9 °C (98.5 °F) (Temporal)   Resp 16   Ht 1.6 m (5' 3\")   Wt 59.1 kg (130 lb 4.7 oz)   LMP 09/21/2011   SpO2 93%   BMI 23.08 kg/m²     POD #2 laparotomy with repair of duodenal perforation    Transfer from ICU to GSU  WBC remains elevated but stable  Abdomen soft, tender  Prevena in place, functional, sanguinous drainage  JG with minimal serosanguinous drainage  NGT to low intermittent suction  Adequate pain control    - Continue NGT suction for now  - Check CBC, coags    Appreciate medicine team assistance in care of this patient  "

## 2019-10-06 NOTE — PROGRESS NOTES
Patient transferred to room 401 in stable condition. VSS on RA. PCA infusing. Two RN skin check completed with LEANNE Roberto. Preveena midline intact, RLQ JG drain dressing CDI. Missing digits on right hand noted- healed. Mepilex on sacrum for prevention.

## 2019-10-06 NOTE — ASSESSMENT & PLAN NOTE
Postprocedure, could be anesthesia/intubation related-clearing.  Continue with Dysphagia 3 diet , SLP evaluation  Stable respiratory symptoms, continue close clinical monitoring

## 2019-10-06 NOTE — PROGRESS NOTES
"/79   Pulse 92   Temp 36.9 °C (98.5 °F) (Temporal)   Resp 16   Ht 1.6 m (5' 3\")   Wt 59.1 kg (130 lb 4.7 oz)   LMP 09/21/2011   SpO2 93%   BMI 23.08 kg/m²     Notified by nursing of sanguinous output from prevena  VSS, denies dizziness or shortness of breath  Abdomen soft, tender    - Monitor  - Check PTT/INR and CBC  "

## 2019-10-06 NOTE — PROGRESS NOTES
[facility] Medicine Daily Progress Note    Date of Service  10/6/2019    Chief Complaint  49 y.o. female admitted 10/4/2019 with abdominal pain    Hospital Course    Patient with underlying gastroesophageal reflux disease, osteoporosis, chronic pain syndrome, rheumatoid arthritis, history of asthma/COPD, tobacco dependence, anxiety disorder, depression presented to the emergency department on October 4, 2019 with complaints of abdominal pain, with CT findings concerning for abdominal abscess/free air, concerns for duodenal perforation.  Acute surgical abdomen on presentation with acute kidney injury, admitted in critical condition to the ICU under care of surgical team, surgical consultation obtained.  Taken to the operating room on October 4, 2019 by Dr. Dumont, exploratory laparotomy performed revealing duodenal perforation status post repair.  Cared for in the ICU by the trauma team.      Interval Problem Update  Patient seen and evaluated on rounds  Discussed with nursing staff on rounds  Transferred out of ICU  Patient reports having hoarseness postoperatively, persistent, feeling of dysphagia  NG remains in place  Abdominal Marla, drains in place  On fentanyl PCA, pain improved  Surgical team following  No other issues/complaints    Consultants/Specialty  General surgery    Code Status  Full code    Disposition  PT/OT evaluation    Review of Systems  Review of Systems   Constitutional: Positive for malaise/fatigue. Negative for chills and fever.   HENT: Negative for hearing loss.    Eyes: Negative for blurred vision and double vision.   Respiratory: Negative for cough, hemoptysis and shortness of breath.    Cardiovascular: Negative for chest pain, palpitations and PND.   Gastrointestinal: Positive for constipation. Negative for abdominal pain, blood in stool, diarrhea, melena, nausea and vomiting.        NG in place, postoperative   Skin: Negative for rash.   Neurological: Negative for dizziness and  headaches.        Physical Exam  Temp:  [36.7 °C (98 °F)-37.4 °C (99.3 °F)] 37.2 °C (99 °F)  Pulse:  [] 87  Resp:  [16-24] 16  BP: (111-126)/(74-81) 115/74  SpO2:  [92 %-97 %] 94 %    Physical Exam   Constitutional: She is oriented to person, place, and time. She appears well-developed and well-nourished. No distress.   HENT:   Head: Normocephalic.   Mouth/Throat: Oropharynx is clear and moist. No oropharyngeal exudate.   Eyes: Pupils are equal, round, and reactive to light. Conjunctivae are normal. No scleral icterus.   Neck: No JVD present.   Cardiovascular: Normal rate, regular rhythm and normal heart sounds. Exam reveals no gallop and no friction rub.   No murmur heard.  Pulmonary/Chest: Breath sounds normal. No stridor. No respiratory distress. She has no wheezes. She has no rales.   Diminished bases   Abdominal: Soft. She exhibits distension. There is no tenderness. There is no rebound.   Surgical wound, Marla wound VAC in place, drains in place, hypoactive bowel sounds, no tenderness   Musculoskeletal: Normal range of motion. She exhibits deformity. She exhibits no edema or tenderness.   Right hand finger amputations   Neurological: She is alert and oriented to person, place, and time. No cranial nerve deficit.   Skin: Skin is warm and dry. She is not diaphoretic.   Psychiatric: She has a normal mood and affect. Her behavior is normal. Judgment and thought content normal.       Fluids    Intake/Output Summary (Last 24 hours) at 10/6/2019 1324  Last data filed at 10/6/2019 1000  Gross per 24 hour   Intake 1213.4 ml   Output 2450 ml   Net -1236.6 ml       Laboratory  Recent Labs     10/04/19  0330 10/05/19  0500 10/06/19  0308   WBC 21.0* 19.3* 18.9*   RBC 3.73* 3.12* 2.85*   HEMOGLOBIN 12.4 10.3* 9.4*   HEMATOCRIT 37.8 32.6* 29.8*   .3* 104.5* 104.6*   MCH 33.2* 33.0 33.0   MCHC 32.8* 31.6* 31.5*   RDW 57.1* 60.9* 61.6*   PLATELETCT 279 269 274   MPV 11.1 11.1 10.2     Recent Labs      10/04/19  0330 10/05/19  0500 10/06/19  0308   SODIUM 135 138 138   POTASSIUM 3.4* 3.8 3.4*   CHLORIDE 112 113* 112   CO2 11* 14* 13*   GLUCOSE 154* 103* 94   BUN 18 13 11   CREATININE 1.02 0.82 0.70   CALCIUM 9.5 8.5 8.7                   Imaging  CT-ABDOMEN-PELVIS WITH   Final Result   Addendum 1 of 1   Addendum: There is focus of air seen adjacent to the second portion of the    duodenum, the duodenal wall appears slightly thickened. Consider duodenal    ulcer/perforation as etiology of these findings.      These findings were discussed with the patient's clinician, MARSHALL DEL VALLE,    on 10/4/2019 7:27 AM.      Final           Assessment/Plan  Duodenal perforation (HCC)- (present on admission)  Assessment & Plan  Status post exploratory laparotomy and surgical repair  Ongoing postoperative care per surgical team  Continue empiric antibiotics, ceftriaxone and Flagyl and de-escalate as clinically appropriate  Initiation of diet, NG management per surgical team  Postoperatively on a fentanyl PCA for pain control, continue for now, monitor for adverse effects related to the use of PCA  Anticipate weaning of PCA tomorrow  [id number] initiated     Hoarseness- (present on admission)  Assessment & Plan  Postprocedure, could be anesthesia/intubation related  SLP evaluation  Continue close clinical monitoring  If not improving, ENT evaluation    HASMUKH (acute kidney injury) (HCC)- (present on admission)  Assessment & Plan  Resolved    Hypokalemia- (present on admission)  Assessment & Plan  K containing IVF    Rheumatoid arthritis (HCC)- (present on admission)  Assessment & Plan  History of  Outpatient rheumatology follow-up    Tobacco dependence- (present on admission)  Assessment & Plan  Patient has been counseled and educated regarding cessation    Leukocytosis- (present on admission)  Assessment & Plan  Related to above, improving, daily CBC       VTE prophylaxis: SC Heparin

## 2019-10-06 NOTE — PROGRESS NOTES
Assumed care at 1900    Received report from day shift RN.    Reviewed recent lab results, notes, orders, and MAR  POC discussed and updated on care board  Bed is in the lowest and locked position, call light within reach      Fentanyl PCA    Rate verified  Patient denies pain  Stand by assist   Voiding+  +flatus   JG drain   NG tube rt nares to low continuous suction.    2L nasal canula  Tachy

## 2019-10-06 NOTE — PROGRESS NOTES
This RN has had to change prevena wound vac cannister x2 today. Full of sanguinous output. Notified Kristi RICHEY with surgery.

## 2019-10-06 NOTE — PROGRESS NOTES
Transferred to floor in stable condition. Preveena and JG dressing CDI. Fentanyl PCA infusing. NG to LIS. Voided using bedpan. VSS on 2L NC. Pain controlled with PCA.     Handoff to LEANNE Powell

## 2019-10-07 LAB
ALBUMIN SERPL BCP-MCNC: 3.5 G/DL (ref 3.2–4.9)
ALBUMIN/GLOB SERPL: 1.2 G/DL
ALP SERPL-CCNC: 95 U/L (ref 30–99)
ALT SERPL-CCNC: 17 U/L (ref 2–50)
ANION GAP SERPL CALC-SCNC: 13 MMOL/L (ref 0–11.9)
AST SERPL-CCNC: 21 U/L (ref 12–45)
BASOPHILS # BLD AUTO: 0.2 % (ref 0–1.8)
BASOPHILS # BLD: 0.04 K/UL (ref 0–0.12)
BILIRUB SERPL-MCNC: 0.3 MG/DL (ref 0.1–1.5)
BUN SERPL-MCNC: 7 MG/DL (ref 8–22)
CALCIUM SERPL-MCNC: 8.1 MG/DL (ref 8.5–10.5)
CHLORIDE SERPL-SCNC: 110 MMOL/L (ref 96–112)
CO2 SERPL-SCNC: 20 MMOL/L (ref 20–33)
CREAT SERPL-MCNC: 0.62 MG/DL (ref 0.5–1.4)
EOSINOPHIL # BLD AUTO: 0.12 K/UL (ref 0–0.51)
EOSINOPHIL NFR BLD: 0.7 % (ref 0–6.9)
ERYTHROCYTE [DISTWIDTH] IN BLOOD BY AUTOMATED COUNT: 58.7 FL (ref 35.9–50)
GLOBULIN SER CALC-MCNC: 2.9 G/DL (ref 1.9–3.5)
GLUCOSE SERPL-MCNC: 140 MG/DL (ref 65–99)
HCT VFR BLD AUTO: 30.9 % (ref 37–47)
HGB BLD-MCNC: 10 G/DL (ref 12–16)
IMM GRANULOCYTES # BLD AUTO: 0.13 K/UL (ref 0–0.11)
IMM GRANULOCYTES NFR BLD AUTO: 0.8 % (ref 0–0.9)
LYMPHOCYTES # BLD AUTO: 0.99 K/UL (ref 1–4.8)
LYMPHOCYTES NFR BLD: 6.1 % (ref 22–41)
MAGNESIUM SERPL-MCNC: 2 MG/DL (ref 1.5–2.5)
MCH RBC QN AUTO: 32.9 PG (ref 27–33)
MCHC RBC AUTO-ENTMCNC: 32.4 G/DL (ref 33.6–35)
MCV RBC AUTO: 101.6 FL (ref 81.4–97.8)
MONOCYTES # BLD AUTO: 1.02 K/UL (ref 0–0.85)
MONOCYTES NFR BLD AUTO: 6.3 % (ref 0–13.4)
NEUTROPHILS # BLD AUTO: 13.86 K/UL (ref 2–7.15)
NEUTROPHILS NFR BLD: 85.9 % (ref 44–72)
NRBC # BLD AUTO: 0 K/UL
NRBC BLD-RTO: 0 /100 WBC
PHOSPHATE SERPL-MCNC: <1 MG/DL (ref 2.5–4.5)
PLATELET # BLD AUTO: 294 K/UL (ref 164–446)
PMV BLD AUTO: 10 FL (ref 9–12.9)
POTASSIUM SERPL-SCNC: 3 MMOL/L (ref 3.6–5.5)
PROT SERPL-MCNC: 6.4 G/DL (ref 6–8.2)
RBC # BLD AUTO: 3.04 M/UL (ref 4.2–5.4)
SODIUM SERPL-SCNC: 143 MMOL/L (ref 135–145)
WBC # BLD AUTO: 16.2 K/UL (ref 4.8–10.8)

## 2019-10-07 PROCEDURE — C9113 INJ PANTOPRAZOLE SODIUM, VIA: HCPCS | Performed by: INTERNAL MEDICINE

## 2019-10-07 PROCEDURE — 36415 COLL VENOUS BLD VENIPUNCTURE: CPT

## 2019-10-07 PROCEDURE — 700105 HCHG RX REV CODE 258

## 2019-10-07 PROCEDURE — 80053 COMPREHEN METABOLIC PANEL: CPT

## 2019-10-07 PROCEDURE — 700111 HCHG RX REV CODE 636 W/ 250 OVERRIDE (IP): Performed by: INTERNAL MEDICINE

## 2019-10-07 PROCEDURE — 83735 ASSAY OF MAGNESIUM: CPT

## 2019-10-07 PROCEDURE — 770001 HCHG ROOM/CARE - MED/SURG/GYN PRIV*

## 2019-10-07 PROCEDURE — 700105 HCHG RX REV CODE 258: Performed by: INTERNAL MEDICINE

## 2019-10-07 PROCEDURE — 700101 HCHG RX REV CODE 250: Performed by: INTERNAL MEDICINE

## 2019-10-07 PROCEDURE — 99232 SBSQ HOSP IP/OBS MODERATE 35: CPT | Performed by: INTERNAL MEDICINE

## 2019-10-07 PROCEDURE — 85025 COMPLETE CBC W/AUTO DIFF WBC: CPT

## 2019-10-07 PROCEDURE — 84100 ASSAY OF PHOSPHORUS: CPT

## 2019-10-07 RX ORDER — PANTOPRAZOLE SODIUM 40 MG/10ML
40 INJECTION, POWDER, LYOPHILIZED, FOR SOLUTION INTRAVENOUS 2 TIMES DAILY
Status: DISCONTINUED | OUTPATIENT
Start: 2019-10-07 | End: 2019-10-09

## 2019-10-07 RX ORDER — MORPHINE SULFATE 4 MG/ML
4 INJECTION, SOLUTION INTRAMUSCULAR; INTRAVENOUS
Status: DISCONTINUED | OUTPATIENT
Start: 2019-10-07 | End: 2019-10-09

## 2019-10-07 RX ORDER — SODIUM CHLORIDE 9 MG/ML
INJECTION, SOLUTION INTRAVENOUS
Status: COMPLETED
Start: 2019-10-07 | End: 2019-10-07

## 2019-10-07 RX ADMIN — HEPARIN SODIUM 5000 UNITS: 5000 INJECTION, SOLUTION INTRAVENOUS; SUBCUTANEOUS at 13:44

## 2019-10-07 RX ADMIN — PANTOPRAZOLE SODIUM 40 MG: 40 INJECTION, POWDER, FOR SOLUTION INTRAVENOUS at 09:46

## 2019-10-07 RX ADMIN — ONDANSETRON 4 MG: 2 INJECTION INTRAMUSCULAR; INTRAVENOUS at 23:40

## 2019-10-07 RX ADMIN — POTASSIUM PHOSPHATE, MONOBASIC AND POTASSIUM PHOSPHATE, DIBASIC 30 MMOL: 224; 236 INJECTION, SOLUTION, CONCENTRATE INTRAVENOUS at 10:03

## 2019-10-07 RX ADMIN — MORPHINE SULFATE 4 MG: 4 INJECTION INTRAVENOUS at 19:33

## 2019-10-07 RX ADMIN — SODIUM CHLORIDE 500 ML: 9 INJECTION, SOLUTION INTRAVENOUS at 10:09

## 2019-10-07 RX ADMIN — METRONIDAZOLE 500 MG: 500 INJECTION, SOLUTION INTRAVENOUS at 06:49

## 2019-10-07 RX ADMIN — METRONIDAZOLE 500 MG: 500 INJECTION, SOLUTION INTRAVENOUS at 13:43

## 2019-10-07 RX ADMIN — HEPARIN SODIUM 5000 UNITS: 5000 INJECTION, SOLUTION INTRAVENOUS; SUBCUTANEOUS at 05:09

## 2019-10-07 RX ADMIN — METRONIDAZOLE 500 MG: 500 INJECTION, SOLUTION INTRAVENOUS at 21:57

## 2019-10-07 RX ADMIN — MORPHINE SULFATE 4 MG: 4 INJECTION INTRAVENOUS at 22:30

## 2019-10-07 RX ADMIN — HEPARIN SODIUM 5000 UNITS: 5000 INJECTION, SOLUTION INTRAVENOUS; SUBCUTANEOUS at 21:57

## 2019-10-07 RX ADMIN — DEXTROSE MONOHYDRATE, SODIUM CHLORIDE, SODIUM LACTATE, POTASSIUM CHLORIDE, CALCIUM CHLORIDE: 5; 600; 310; 179; 20 INJECTION, SOLUTION INTRAVENOUS at 13:44

## 2019-10-07 RX ADMIN — PANTOPRAZOLE SODIUM 40 MG: 40 INJECTION, POWDER, FOR SOLUTION INTRAVENOUS at 17:31

## 2019-10-07 RX ADMIN — DEXTROSE MONOHYDRATE, SODIUM CHLORIDE, SODIUM LACTATE, POTASSIUM CHLORIDE, CALCIUM CHLORIDE: 5; 600; 310; 179; 20 INJECTION, SOLUTION INTRAVENOUS at 05:09

## 2019-10-07 RX ADMIN — CEFTRIAXONE SODIUM 2 G: 2 INJECTION, POWDER, FOR SOLUTION INTRAMUSCULAR; INTRAVENOUS at 05:09

## 2019-10-07 RX ADMIN — ONDANSETRON 4 MG: 2 INJECTION INTRAMUSCULAR; INTRAVENOUS at 19:34

## 2019-10-07 ASSESSMENT — ENCOUNTER SYMPTOMS
ABDOMINAL PAIN: 1
VOMITING: 0
HEADACHES: 0
DOUBLE VISION: 0
SHORTNESS OF BREATH: 0
DIARRHEA: 0
HEMOPTYSIS: 0
FEVER: 0
COUGH: 0
PND: 0
CONSTIPATION: 1
PALPITATIONS: 0
FOCAL WEAKNESS: 0
DIZZINESS: 0
MYALGIAS: 1
ABDOMINAL PAIN: 0
BLOOD IN STOOL: 0
NAUSEA: 0
CHILLS: 0
BLURRED VISION: 0

## 2019-10-07 NOTE — CARE PLAN
Problem: Pain Management  Goal: Pain level will decrease to patient's comfort goal  Outcome: PROGRESSING AS EXPECTED   Pain is well controlled with PCA  Problem: Communication  Goal: The ability to communicate needs accurately and effectively will improve  Outcome: PROGRESSING AS EXPECTED   Participation in POC  Problem: Safety  Goal: Will remain free from injury  Outcome: PROGRESSING AS EXPECTED   Fall precautions in place
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
## 2019-10-07 NOTE — THERAPY
SPEECH THERAPY CONTACT NOTE:     This SLP attempted to see patient for clinical swallow evaluation. Per RN, hold, as patient is only able to have sips of clears per RN and is not cleared for diet at this time. SLP will re-attempt tomorrow as able and appropriate. Thank you.

## 2019-10-07 NOTE — PROGRESS NOTES
Trauma / Surgical Daily Progress Note    Date of Service  10/7/2019    Chief Complaint  49 y.o. female admitted 10/4/2019 with Retroperitoneal air     Interval Events  WBC trend down  NGT with old bloody clear drainage  JG minimal output  Prevena output decreased  Bowel sounds, abdomen soft, incisional tenderness    - DC JG drain, prevena and NGT  - Sips of clears   - Electrolyte replacement per medicine team    Review of Systems  Review of Systems   Constitutional: Negative for chills and fever.   Respiratory: Negative for shortness of breath.    Cardiovascular: Negative for chest pain.   Gastrointestinal: Positive for abdominal pain (midline). Negative for nausea and vomiting.   Genitourinary: Negative for dysuria.   Musculoskeletal: Positive for myalgias.   Neurological: Negative for focal weakness.        Vital Signs  Temp:  [36.9 °C (98.5 °F)-37.5 °C (99.5 °F)] 37.5 °C (99.5 °F)  Pulse:  [69-95] 69  Resp:  [16-20] 17  BP: (107-129)/(58-79) 108/70  SpO2:  [90 %-95 %] 92 %    Physical Exam  Physical Exam   Constitutional: She is oriented to person, place, and time. She appears well-developed. No distress.   HENT:   Head: Normocephalic.   Nasogastric tube, brown old clear bloody drainage   Eyes: Conjunctivae are normal.   Neck: Neck supple. No JVD present.   Cardiovascular: Normal rate.   Pulmonary/Chest: Effort normal. No respiratory distress. She exhibits no tenderness.   Abdominal: She exhibits no distension. There is tenderness. There is no rebound and no guarding.   (+)bowel sounds  Prevena wound vac.   JG drain with scant serosanguinous drainage   Musculoskeletal: Normal range of motion.   Neurological: She is alert and oriented to person, place, and time.   Skin: Skin is warm and dry.   Nursing note and vitals reviewed.      Laboratory  Recent Results (from the past 24 hour(s))   Prothrombin Time    Collection Time: 10/06/19  3:36 PM   Result Value Ref Range    PT 17.2 (H) 12.0 - 14.6 sec    INR 1.36 (H)  0.87 - 1.13   APTT    Collection Time: 10/06/19  3:36 PM   Result Value Ref Range    APTT 39.4 (H) 24.7 - 36.0 sec   CBC WITH DIFFERENTIAL    Collection Time: 10/06/19  3:36 PM   Result Value Ref Range    WBC 18.7 (H) 4.8 - 10.8 K/uL    RBC 2.80 (L) 4.20 - 5.40 M/uL    Hemoglobin 9.6 (L) 12.0 - 16.0 g/dL    Hematocrit 30.0 (L) 37.0 - 47.0 %    .1 (H) 81.4 - 97.8 fL    MCH 34.3 (H) 27.0 - 33.0 pg    MCHC 32.0 (L) 33.6 - 35.0 g/dL    RDW 63.0 (H) 35.9 - 50.0 fL    Platelet Count 281 164 - 446 K/uL    MPV 10.3 9.0 - 12.9 fL    Neutrophils-Polys 89.80 (H) 44.00 - 72.00 %    Lymphocytes 4.00 (L) 22.00 - 41.00 %    Monocytes 4.80 0.00 - 13.40 %    Eosinophils 0.20 0.00 - 6.90 %    Basophils 0.20 0.00 - 1.80 %    Immature Granulocytes 1.00 (H) 0.00 - 0.90 %    Nucleated RBC 0.00 /100 WBC    Neutrophils (Absolute) 16.77 (H) 2.00 - 7.15 K/uL    Lymphs (Absolute) 0.75 (L) 1.00 - 4.80 K/uL    Monos (Absolute) 0.89 (H) 0.00 - 0.85 K/uL    Eos (Absolute) 0.03 0.00 - 0.51 K/uL    Baso (Absolute) 0.04 0.00 - 0.12 K/uL    Immature Granulocytes (abs) 0.18 (H) 0.00 - 0.11 K/uL    NRBC (Absolute) 0.00 K/uL   CBC WITH DIFFERENTIAL    Collection Time: 10/07/19  4:31 AM   Result Value Ref Range    WBC 16.2 (H) 4.8 - 10.8 K/uL    RBC 3.04 (L) 4.20 - 5.40 M/uL    Hemoglobin 10.0 (L) 12.0 - 16.0 g/dL    Hematocrit 30.9 (L) 37.0 - 47.0 %    .6 (H) 81.4 - 97.8 fL    MCH 32.9 27.0 - 33.0 pg    MCHC 32.4 (L) 33.6 - 35.0 g/dL    RDW 58.7 (H) 35.9 - 50.0 fL    Platelet Count 294 164 - 446 K/uL    MPV 10.0 9.0 - 12.9 fL    Neutrophils-Polys 85.90 (H) 44.00 - 72.00 %    Lymphocytes 6.10 (L) 22.00 - 41.00 %    Monocytes 6.30 0.00 - 13.40 %    Eosinophils 0.70 0.00 - 6.90 %    Basophils 0.20 0.00 - 1.80 %    Immature Granulocytes 0.80 0.00 - 0.90 %    Nucleated RBC 0.00 /100 WBC    Neutrophils (Absolute) 13.86 (H) 2.00 - 7.15 K/uL    Lymphs (Absolute) 0.99 (L) 1.00 - 4.80 K/uL    Monos (Absolute) 1.02 (H) 0.00 - 0.85 K/uL    Eos  (Absolute) 0.12 0.00 - 0.51 K/uL    Baso (Absolute) 0.04 0.00 - 0.12 K/uL    Immature Granulocytes (abs) 0.13 (H) 0.00 - 0.11 K/uL    NRBC (Absolute) 0.00 K/uL   Comp Metabolic Panel    Collection Time: 10/07/19  4:31 AM   Result Value Ref Range    Sodium 143 135 - 145 mmol/L    Potassium 3.0 (L) 3.6 - 5.5 mmol/L    Chloride 110 96 - 112 mmol/L    Co2 20 20 - 33 mmol/L    Anion Gap 13.0 (H) 0.0 - 11.9    Glucose 140 (H) 65 - 99 mg/dL    Bun 7 (L) 8 - 22 mg/dL    Creatinine 0.62 0.50 - 1.40 mg/dL    Calcium 8.1 (L) 8.5 - 10.5 mg/dL    AST(SGOT) 21 12 - 45 U/L    ALT(SGPT) 17 2 - 50 U/L    Alkaline Phosphatase 95 30 - 99 U/L    Total Bilirubin 0.3 0.1 - 1.5 mg/dL    Albumin 3.5 3.2 - 4.9 g/dL    Total Protein 6.4 6.0 - 8.2 g/dL    Globulin 2.9 1.9 - 3.5 g/dL    A-G Ratio 1.2 g/dL   MAGNESIUM    Collection Time: 10/07/19  4:31 AM   Result Value Ref Range    Magnesium 2.0 1.5 - 2.5 mg/dL   PHOSPHORUS    Collection Time: 10/07/19  4:31 AM   Result Value Ref Range    Phosphorus <1.0 (LL) 2.5 - 4.5 mg/dL   ESTIMATED GFR    Collection Time: 10/07/19  4:31 AM   Result Value Ref Range    GFR If African American >60 >60 mL/min/1.73 m 2    GFR If Non African American >60 >60 mL/min/1.73 m 2       Fluids    Intake/Output Summary (Last 24 hours) at 10/7/2019 1019  Last data filed at 10/7/2019 0800  Gross per 24 hour   Intake 1121.33 ml   Output 3290 ml   Net -2168.67 ml       Core Measures & Quality Metrics  Labs reviewed, Medications reviewed and Radiology images reviewed  Sullivan catheter: No Sullivan      DVT Prophylaxis: Heparin  DVT prophylaxis - mechanical: SCDs  Ulcer prophylaxis: Yes        NAPOLEON Score  ETOH Screening    Assessment/Plan  Duodenal perforation (HCC)- (present on admission)  Assessment & Plan  Soft tissue gas in the perinephric space of the right kidney tracking into the retroperitoneum posterior to the liver with small quantity of free fluid in the perinephric space  10/4  Laparotomy repair of duodenal  perforation, drain which peritoneal collection  10/7 DC NGT, JG drain and Prevena. Sips of clears.   James Dumont MD. General Surgery     HASMUKH (acute kidney injury) (HCC)- (present on admission)  Assessment & Plan  Ongoing resuscitation  Follow parameters        Discussed patient condition with RN, Patient and general surgery. Dr. Dumont

## 2019-10-07 NOTE — PROGRESS NOTES
Assumed care of patient at 0700. Patient is alert and oriented, respirations are unlabored and regular, patient lying in bed at this time. Patient states pain is okay, bolus button available. Lung sounds diminished throughout. NG in place to low intermittent suction, dark red output. Abdomen soft, tender, prevena vac over midline incision, secured appropriately with small drainage around vac site, JG to RLQ with serosanguineous output. Patient would not allow me to visualize skin under socks stating it hurts to remove socks because she has RA. Heels floated on pillow. Bed in lowest position, call light within reach, patient states no needs at this time.

## 2019-10-07 NOTE — PROGRESS NOTES
Lab called with critical result of Phosphorus of less than 1 at 0530. Critical lab result read back to Lab.   Dr. Pereira notified of critical lab result at 0540.  Critical lab result read back by Dr. Pereira.        MALA Shelley notified of critical lab result at 0535.  Critical lab result read back by ROSSI Shelley.

## 2019-10-07 NOTE — CARE PLAN
Problem: Pain Management  Goal: Pain level will decrease to patient's comfort goal  Outcome: PROGRESSING AS EXPECTED   Bolus button to be discontinued per physician, reposition for comfort, pillow for abdominal support during coughing.    Problem: Bowel/Gastric:  Goal: Normal bowel function is maintained or improved  Outcome: PROGRESSING AS EXPECTED  DC NG, started on sips of clears, monitor bowel sounds/passing gas, ambulate in hallway x2 at a minimum of 50ft.

## 2019-10-07 NOTE — DISCHARGE PLANNING
Patient is eligible for Medicaid Meds to Beds at discharge Monday-Friday 8 a.m. - 4 p.m. Preferred pharmacy changed to [facility]. Please call [phone] prior to discharge.

## 2019-10-07 NOTE — PROGRESS NOTES
Assumed care at 1900    Received report from day shift RN.    Reviewed recent lab results, notes, orders, and MAR  POC discussed and updated on care board  Bed is in the lowest and locked position, call light within reach      Patient's PCA was disconnected from IV   Reconnected, patient states that her pain is better controlled now.    Rate verified  JG drain to bulb suction has serosanguinous output  Midline incision with Preveena Wound Vac appears to be saturated around foam and not getting a complete seal   Applied tergaderm to help re-enforce it   Placed wound team consult to come assess.    RA  Saturating in the 90's  Patient is steady just needs assistance unplugging everything.    +voiding  -BM  NG tube to intermittent low suction

## 2019-10-08 PROBLEM — G89.29 CHRONIC PAIN: Status: ACTIVE | Noted: 2019-10-08

## 2019-10-08 PROBLEM — E83.39 HYPOPHOSPHATEMIA: Status: ACTIVE | Noted: 2019-10-08

## 2019-10-08 LAB
ALBUMIN SERPL BCP-MCNC: 3.1 G/DL (ref 3.2–4.9)
ALBUMIN/GLOB SERPL: 1 G/DL
ALP SERPL-CCNC: 106 U/L (ref 30–99)
ALT SERPL-CCNC: 18 U/L (ref 2–50)
ANION GAP SERPL CALC-SCNC: 10 MMOL/L (ref 0–11.9)
ANION GAP SERPL CALC-SCNC: 9 MMOL/L (ref 0–11.9)
AST SERPL-CCNC: 19 U/L (ref 12–45)
BASOPHILS # BLD AUTO: 0.3 % (ref 0–1.8)
BASOPHILS # BLD: 0.04 K/UL (ref 0–0.12)
BILIRUB SERPL-MCNC: 0.3 MG/DL (ref 0.1–1.5)
BUN SERPL-MCNC: 3 MG/DL (ref 8–22)
BUN SERPL-MCNC: <3 MG/DL (ref 8–22)
CALCIUM SERPL-MCNC: 7.8 MG/DL (ref 8.5–10.5)
CALCIUM SERPL-MCNC: 8.2 MG/DL (ref 8.5–10.5)
CHLORIDE SERPL-SCNC: 103 MMOL/L (ref 96–112)
CHLORIDE SERPL-SCNC: 105 MMOL/L (ref 96–112)
CO2 SERPL-SCNC: 24 MMOL/L (ref 20–33)
CO2 SERPL-SCNC: 26 MMOL/L (ref 20–33)
CREAT SERPL-MCNC: 0.55 MG/DL (ref 0.5–1.4)
CREAT SERPL-MCNC: 0.61 MG/DL (ref 0.5–1.4)
EOSINOPHIL # BLD AUTO: 0.23 K/UL (ref 0–0.51)
EOSINOPHIL NFR BLD: 1.5 % (ref 0–6.9)
ERYTHROCYTE [DISTWIDTH] IN BLOOD BY AUTOMATED COUNT: 57.6 FL (ref 35.9–50)
GLOBULIN SER CALC-MCNC: 3 G/DL (ref 1.9–3.5)
GLUCOSE SERPL-MCNC: 134 MG/DL (ref 65–99)
GLUCOSE SERPL-MCNC: 135 MG/DL (ref 65–99)
HCT VFR BLD AUTO: 34.4 % (ref 37–47)
HGB BLD-MCNC: 11.4 G/DL (ref 12–16)
IMM GRANULOCYTES # BLD AUTO: 0.14 K/UL (ref 0–0.11)
IMM GRANULOCYTES NFR BLD AUTO: 0.9 % (ref 0–0.9)
LYMPHOCYTES # BLD AUTO: 1.04 K/UL (ref 1–4.8)
LYMPHOCYTES NFR BLD: 6.8 % (ref 22–41)
MAGNESIUM SERPL-MCNC: 1.7 MG/DL (ref 1.5–2.5)
MCH RBC QN AUTO: 33.3 PG (ref 27–33)
MCHC RBC AUTO-ENTMCNC: 33.1 G/DL (ref 33.6–35)
MCV RBC AUTO: 100.6 FL (ref 81.4–97.8)
MONOCYTES # BLD AUTO: 1.29 K/UL (ref 0–0.85)
MONOCYTES NFR BLD AUTO: 8.5 % (ref 0–13.4)
NEUTROPHILS # BLD AUTO: 12.49 K/UL (ref 2–7.15)
NEUTROPHILS NFR BLD: 82 % (ref 44–72)
NRBC # BLD AUTO: 0 K/UL
NRBC BLD-RTO: 0 /100 WBC
PHOSPHATE SERPL-MCNC: 1.6 MG/DL (ref 2.5–4.5)
PLATELET # BLD AUTO: 319 K/UL (ref 164–446)
PMV BLD AUTO: 10.1 FL (ref 9–12.9)
POTASSIUM SERPL-SCNC: 2.9 MMOL/L (ref 3.6–5.5)
POTASSIUM SERPL-SCNC: 3.1 MMOL/L (ref 3.6–5.5)
PROT SERPL-MCNC: 6.1 G/DL (ref 6–8.2)
RBC # BLD AUTO: 3.42 M/UL (ref 4.2–5.4)
SODIUM SERPL-SCNC: 137 MMOL/L (ref 135–145)
SODIUM SERPL-SCNC: 140 MMOL/L (ref 135–145)
WBC # BLD AUTO: 15.2 K/UL (ref 4.8–10.8)

## 2019-10-08 PROCEDURE — A9270 NON-COVERED ITEM OR SERVICE: HCPCS | Performed by: HOSPITALIST

## 2019-10-08 PROCEDURE — 770001 HCHG ROOM/CARE - MED/SURG/GYN PRIV*

## 2019-10-08 PROCEDURE — 92610 EVALUATE SWALLOWING FUNCTION: CPT

## 2019-10-08 PROCEDURE — 85025 COMPLETE CBC W/AUTO DIFF WBC: CPT

## 2019-10-08 PROCEDURE — A9270 NON-COVERED ITEM OR SERVICE: HCPCS | Performed by: INTERNAL MEDICINE

## 2019-10-08 PROCEDURE — 83735 ASSAY OF MAGNESIUM: CPT

## 2019-10-08 PROCEDURE — 80048 BASIC METABOLIC PNL TOTAL CA: CPT

## 2019-10-08 PROCEDURE — 700102 HCHG RX REV CODE 250 W/ 637 OVERRIDE(OP): Performed by: INTERNAL MEDICINE

## 2019-10-08 PROCEDURE — 99233 SBSQ HOSP IP/OBS HIGH 50: CPT | Performed by: HOSPITALIST

## 2019-10-08 PROCEDURE — 700105 HCHG RX REV CODE 258: Performed by: HOSPITALIST

## 2019-10-08 PROCEDURE — 36415 COLL VENOUS BLD VENIPUNCTURE: CPT

## 2019-10-08 PROCEDURE — 700102 HCHG RX REV CODE 250 W/ 637 OVERRIDE(OP): Performed by: HOSPITALIST

## 2019-10-08 PROCEDURE — C9113 INJ PANTOPRAZOLE SODIUM, VIA: HCPCS | Performed by: INTERNAL MEDICINE

## 2019-10-08 PROCEDURE — A9270 NON-COVERED ITEM OR SERVICE: HCPCS | Performed by: NURSE PRACTITIONER

## 2019-10-08 PROCEDURE — 80053 COMPREHEN METABOLIC PANEL: CPT

## 2019-10-08 PROCEDURE — 84100 ASSAY OF PHOSPHORUS: CPT

## 2019-10-08 PROCEDURE — 97161 PT EVAL LOW COMPLEX 20 MIN: CPT

## 2019-10-08 PROCEDURE — 700102 HCHG RX REV CODE 250 W/ 637 OVERRIDE(OP): Performed by: NURSE PRACTITIONER

## 2019-10-08 PROCEDURE — 700101 HCHG RX REV CODE 250: Performed by: INTERNAL MEDICINE

## 2019-10-08 PROCEDURE — 700101 HCHG RX REV CODE 250: Performed by: HOSPITALIST

## 2019-10-08 PROCEDURE — 700111 HCHG RX REV CODE 636 W/ 250 OVERRIDE (IP): Performed by: INTERNAL MEDICINE

## 2019-10-08 PROCEDURE — 700105 HCHG RX REV CODE 258: Performed by: INTERNAL MEDICINE

## 2019-10-08 PROCEDURE — 700105 HCHG RX REV CODE 258

## 2019-10-08 PROCEDURE — 700111 HCHG RX REV CODE 636 W/ 250 OVERRIDE (IP): Performed by: HOSPITALIST

## 2019-10-08 RX ORDER — OXYCODONE HYDROCHLORIDE 5 MG/1
5-10 TABLET ORAL EVERY 4 HOURS PRN
Status: DISCONTINUED | OUTPATIENT
Start: 2019-10-08 | End: 2019-10-14 | Stop reason: HOSPADM

## 2019-10-08 RX ORDER — POTASSIUM CHLORIDE 7.45 MG/ML
10 INJECTION INTRAVENOUS
Status: COMPLETED | OUTPATIENT
Start: 2019-10-08 | End: 2019-10-08

## 2019-10-08 RX ORDER — SODIUM CHLORIDE 9 MG/ML
INJECTION, SOLUTION INTRAVENOUS
Status: COMPLETED
Start: 2019-10-08 | End: 2019-10-08

## 2019-10-08 RX ORDER — MAGNESIUM SULFATE HEPTAHYDRATE 40 MG/ML
2 INJECTION, SOLUTION INTRAVENOUS ONCE
Status: COMPLETED | OUTPATIENT
Start: 2019-10-08 | End: 2019-10-08

## 2019-10-08 RX ORDER — POTASSIUM CHLORIDE 20 MEQ/1
40 TABLET, EXTENDED RELEASE ORAL DAILY
Status: DISCONTINUED | OUTPATIENT
Start: 2019-10-08 | End: 2019-10-09

## 2019-10-08 RX ADMIN — OXYCODONE HYDROCHLORIDE 10 MG: 5 TABLET ORAL at 19:22

## 2019-10-08 RX ADMIN — DEXTROSE MONOHYDRATE, SODIUM CHLORIDE, SODIUM LACTATE, POTASSIUM CHLORIDE, CALCIUM CHLORIDE: 5; 600; 310; 179; 20 INJECTION, SOLUTION INTRAVENOUS at 17:26

## 2019-10-08 RX ADMIN — PANTOPRAZOLE SODIUM 40 MG: 40 INJECTION, POWDER, FOR SOLUTION INTRAVENOUS at 17:26

## 2019-10-08 RX ADMIN — CEFTRIAXONE SODIUM 2 G: 2 INJECTION, POWDER, FOR SOLUTION INTRAMUSCULAR; INTRAVENOUS at 05:23

## 2019-10-08 RX ADMIN — OXYCODONE HYDROCHLORIDE 10 MG: 5 TABLET ORAL at 23:41

## 2019-10-08 RX ADMIN — SODIUM CHLORIDE 500 ML: 9 INJECTION, SOLUTION INTRAVENOUS at 14:14

## 2019-10-08 RX ADMIN — POTASSIUM PHOSPHATE, MONOBASIC AND POTASSIUM PHOSPHATE, DIBASIC 30 MMOL: 224; 236 INJECTION, SOLUTION, CONCENTRATE INTRAVENOUS at 10:33

## 2019-10-08 RX ADMIN — HEPARIN SODIUM 5000 UNITS: 5000 INJECTION, SOLUTION INTRAVENOUS; SUBCUTANEOUS at 05:26

## 2019-10-08 RX ADMIN — MAGNESIUM SULFATE 2 G: 2 INJECTION INTRAVENOUS at 10:38

## 2019-10-08 RX ADMIN — MORPHINE SULFATE 4 MG: 4 INJECTION INTRAVENOUS at 05:54

## 2019-10-08 RX ADMIN — POTASSIUM CHLORIDE 40 MEQ: 1500 TABLET, EXTENDED RELEASE ORAL at 17:26

## 2019-10-08 RX ADMIN — ONDANSETRON 4 MG: 2 INJECTION INTRAMUSCULAR; INTRAVENOUS at 23:41

## 2019-10-08 RX ADMIN — PANTOPRAZOLE SODIUM 40 MG: 40 INJECTION, POWDER, FOR SOLUTION INTRAVENOUS at 05:26

## 2019-10-08 RX ADMIN — OXYCODONE HYDROCHLORIDE 10 MG: 5 TABLET ORAL at 09:42

## 2019-10-08 RX ADMIN — HEPARIN SODIUM 5000 UNITS: 5000 INJECTION, SOLUTION INTRAVENOUS; SUBCUTANEOUS at 21:08

## 2019-10-08 RX ADMIN — DEXTROSE MONOHYDRATE, SODIUM CHLORIDE, SODIUM LACTATE, POTASSIUM CHLORIDE, CALCIUM CHLORIDE: 5; 600; 310; 179; 20 INJECTION, SOLUTION INTRAVENOUS at 02:42

## 2019-10-08 RX ADMIN — POTASSIUM CHLORIDE 10 MEQ: 7.46 INJECTION, SOLUTION INTRAVENOUS at 09:38

## 2019-10-08 RX ADMIN — METRONIDAZOLE 500 MG: 500 INJECTION, SOLUTION INTRAVENOUS at 21:08

## 2019-10-08 RX ADMIN — ONDANSETRON 4 MG: 2 INJECTION INTRAMUSCULAR; INTRAVENOUS at 05:54

## 2019-10-08 RX ADMIN — MORPHINE SULFATE 4 MG: 4 INJECTION INTRAVENOUS at 02:42

## 2019-10-08 RX ADMIN — HEPARIN SODIUM 5000 UNITS: 5000 INJECTION, SOLUTION INTRAVENOUS; SUBCUTANEOUS at 14:09

## 2019-10-08 RX ADMIN — OXYCODONE HYDROCHLORIDE 10 MG: 5 TABLET ORAL at 14:16

## 2019-10-08 RX ADMIN — ACETAMINOPHEN 650 MG: 325 TABLET, FILM COATED ORAL at 19:22

## 2019-10-08 RX ADMIN — POTASSIUM CHLORIDE 10 MEQ: 7.46 INJECTION, SOLUTION INTRAVENOUS at 08:34

## 2019-10-08 RX ADMIN — METRONIDAZOLE 500 MG: 500 INJECTION, SOLUTION INTRAVENOUS at 06:00

## 2019-10-08 RX ADMIN — METRONIDAZOLE 500 MG: 500 INJECTION, SOLUTION INTRAVENOUS at 14:09

## 2019-10-08 ASSESSMENT — ENCOUNTER SYMPTOMS
ABDOMINAL PAIN: 1
SHORTNESS OF BREATH: 0
COUGH: 0
FOCAL WEAKNESS: 0
SENSORY CHANGE: 0
BACK PAIN: 0
MYALGIAS: 1
FEVER: 0
SPEECH CHANGE: 0
NAUSEA: 0
PALPITATIONS: 0
WEAKNESS: 0
EYE REDNESS: 0
EYE DISCHARGE: 0
DIARRHEA: 0
NECK PAIN: 0
WHEEZING: 0
STRIDOR: 0
VOMITING: 0
CHILLS: 0
HALLUCINATIONS: 0
FLANK PAIN: 0
DIAPHORESIS: 0
TREMORS: 0

## 2019-10-08 ASSESSMENT — GAIT ASSESSMENTS
DEVIATION: BRADYKINETIC
GAIT LEVEL OF ASSIST: SUPERVISED
DISTANCE (FEET): 300

## 2019-10-08 ASSESSMENT — COGNITIVE AND FUNCTIONAL STATUS - GENERAL
SUGGESTED CMS G CODE MODIFIER MOBILITY: CH
MOBILITY SCORE: 24

## 2019-10-08 ASSESSMENT — LIFESTYLE VARIABLES: SUBSTANCE_ABUSE: 0

## 2019-10-08 NOTE — CARE PLAN
Problem: Knowledge Deficit  Goal: Knowledge of disease process/condition, treatment plan, diagnostic tests, and medications will improve  Outcome: PROGRESSING AS EXPECTED  Note:   Pt reports understanding of plan of care and has no questions at this time.   Goal: Knowledge of the prescribed therapeutic regimen will improve  Outcome: PROGRESSING AS EXPECTED  Note:   Pt reports understanding of plan of care and has no questions at this time

## 2019-10-08 NOTE — PROGRESS NOTES
[facility] Medicine Daily Progress Note    Date of Service  10/8/2019    Chief Complaint  49 y.o. female admitted 10/4/2019 with abdominal pain.     Hospital Course      Hx of  rheumatoid arthritis, GERD, osteoporosis, chronic pain, asthma/COPD, depression anxiety, tobacco dependence admitted with CT findings of abdominal abscess/free air and concern for duodenal perforation with acute kidney injury.  Patient had emergent exploratory laparotomy revealing duodenal perforation status post repair on 10/4/2019 and treated in ICU.    Interval Problem Update    Multiple electrolyte abnormalities with hypokalemia worsened despite IV replacement.  She is hypomagnesemic and has low phosphorus levels.   Periods of severe abd pain- requiring IV morphine. Improved this morning. NGT out.     Consultants/Specialty    Dr Fonseca, surgery     Code Status  Full code.     Disposition  Plan continue IV atbs, PT/OT    Review of Systems  Review of Systems   Constitutional: Negative for chills, diaphoresis, fever and malaise/fatigue.   HENT: Negative for congestion.    Eyes: Negative for discharge and redness.   Respiratory: Negative for cough, shortness of breath, wheezing and stridor.    Cardiovascular: Negative for chest pain, palpitations and leg swelling.   Gastrointestinal: Positive for abdominal pain. Negative for diarrhea, nausea and vomiting.   Genitourinary: Negative for flank pain and hematuria.   Musculoskeletal: Negative for back pain, joint pain and neck pain.   Neurological: Negative for tremors, sensory change, speech change, focal weakness and weakness.   Psychiatric/Behavioral: Negative for hallucinations and substance abuse.        Physical Exam  Temp:  [36.8 °C (98.3 °F)-37.3 °C (99.2 °F)] 36.9 °C (98.4 °F)  Pulse:  [65-80] 76  Resp:  [18-20] 18  BP: (118-140)/(73-88) 121/79  SpO2:  [92 %-98 %] 93 %    Physical Exam   Constitutional: She is oriented to person, place, and time. No distress.   HENT:   Head: Normocephalic  and atraumatic.   Right Ear: External ear normal.   Left Ear: External ear normal.   Nose: Nose normal.   Eyes: EOM are normal. Right eye exhibits no discharge. Left eye exhibits no discharge. No scleral icterus.   Neck: Neck supple. No JVD present.   Cardiovascular: Normal rate and regular rhythm.   No murmur heard.  Pulmonary/Chest: Effort normal. No stridor. She has no wheezes. She has no rales.   Abdominal: Soft. She exhibits no distension. There is no tenderness.   Midline  surgical incision w staples present- no dehisc, redness , normal BS.   Musculoskeletal: She exhibits deformity (Right hand finger amputations). She exhibits no edema or tenderness.   Neurological: She is alert and oriented to person, place, and time. No cranial nerve deficit.   Skin: Skin is warm and dry. She is not diaphoretic. No pallor.   Psychiatric: She has a normal mood and affect. Her behavior is normal.   Vitals reviewed.      Fluids    Intake/Output Summary (Last 24 hours) at 10/8/2019 0744  Last data filed at 10/8/2019 0725  Gross per 24 hour   Intake 3298.2 ml   Output 2470 ml   Net 828.2 ml       Laboratory  Recent Labs     10/06/19  1536 10/07/19  0431 10/08/19  0511   WBC 18.7* 16.2* 15.2*   RBC 2.80* 3.04* 3.42*   HEMOGLOBIN 9.6* 10.0* 11.4*   HEMATOCRIT 30.0* 30.9* 34.4*   .1* 101.6* 100.6*   MCH 34.3* 32.9 33.3*   MCHC 32.0* 32.4* 33.1*   RDW 63.0* 58.7* 57.6*   PLATELETCT 281 294 319   MPV 10.3 10.0 10.1     Recent Labs     10/06/19  0308 10/07/19  0431 10/08/19  0511   SODIUM 138 143 140   POTASSIUM 3.4* 3.0* 2.9*   CHLORIDE 112 110 105   CO2 13* 20 26   GLUCOSE 94 140* 135*   BUN 11 7* 3*   CREATININE 0.70 0.62 0.61   CALCIUM 8.7 8.1* 8.2*     Recent Labs     10/06/19  1536   APTT 39.4*   INR 1.36*               Imaging  CT-ABDOMEN-PELVIS WITH   Final Result   Addendum 1 of 1   Addendum: There is focus of air seen adjacent to the second portion of the    duodenum, the duodenal wall appears slightly thickened.  Consider duodenal    ulcer/perforation as etiology of these findings.      These findings were discussed with the patient's clinician, MARSHALL DEL VALLE,    on 10/4/2019 7:27 AM.      Final           Assessment/Plan  * Duodenal perforation (HCC)- (present on admission)  Assessment & Plan  Status post exploratory laparotomy and surgical repair 10/4  Continue empiric antibiotics, ceftriaxone and Flagyl and de-escalate as clinically appropriate  Pain control-PRN IV morphine 4 mg .  Resume home meds when consistently tolerates orals.   Protonix 40 mg IV twice daily.   Continue with IV LR  NGT out.  Plan clears.    Hypophosphatemia  Assessment & Plan  Will replete with IV K-Phos  Correct low magnesium  Monitor levels as will be risk for refeeding syndrome    Hypokalemia- (present on admission)  Assessment & Plan  Worsened despite replacement  We will give IV KCl riders, start oral KCl  Correct low magnesium  Continue IV maintenance fluids with potassium  Follow-up BMP later today.    Hoarseness- (present on admission)  Assessment & Plan  Postprocedure, could be anesthesia/intubation related  SLP evaluation  Stable respiratory symptoms, continue close clinical monitoring  If not improving, ENT evaluation    Chronic pain  Assessment & Plan  Assoc with arthritis   Resume home meds when consistently tolerates orals.     HASMUKH (acute kidney injury) (HCC)- (present on admission)  Assessment & Plan  Resolved    Rheumatoid arthritis (HCC)- (present on admission)  Assessment & Plan  History of  Outpatient rheumatology follow-up    Tobacco dependence- (present on admission)  Assessment & Plan  Patient has been counseled and educated regarding cessation  Continue to emphasize cessation    Leukocytosis- (present on admission)  Assessment & Plan  Associate with duodenal perforation.  Blood cultures negative.  Afebrile  WBC declining--monitor       VTE prophylaxis: Heparin Sc.     Discussed w Mother plan of care.

## 2019-10-08 NOTE — CARE PLAN
Problem: Pain Management  Goal: Pain level will decrease to patient's comfort goal  Outcome: PROGRESSING AS EXPECTED  Pain well controlled with current regimen. Pt educated on regimen and to call for intervention as needed.      Problem: Communication  Goal: The ability to communicate needs accurately and effectively will improve  Outcome: PROGRESSING AS EXPECTED  POC reviewed with pt. All questions answered at this time.

## 2019-10-08 NOTE — PROGRESS NOTES
Assumed care f pt.  AAOx4.  Rating pain at 5/10, declining intervention at this time.  MLI with staples KATHI.  Dressing RLQ, CDI from old JG drain.  Sips of clears at this time.  LBM PTA, + flatus, + void.  POC reviewed with pt.  Call light within reach, pt educated to call for assistance as needed.  Hourly rounding in place.

## 2019-10-08 NOTE — PROGRESS NOTES
Trauma / Surgical Daily Progress Note    Date of Service  10/8/2019    Chief Complaint  49 y.o. female admitted 10/4/2019 with Retroperitoneal air    Interval Events  WBC trend down, Hgb stable  Abdomen soft, incision well approximated   Awaiting GI function  Electrolyte replacement per medicine team    - Clear liquid diet, advance as tolerated  - Continue PPI    Review of Systems  Review of Systems   Constitutional: Negative for chills and fever.   Respiratory: Negative for shortness of breath.    Cardiovascular: Negative for chest pain.   Gastrointestinal: Positive for abdominal pain (midline). Negative for nausea and vomiting.   Genitourinary: Negative for dysuria.   Musculoskeletal: Positive for myalgias.   Neurological: Negative for focal weakness.        Vital Signs  Temp:  [36.8 °C (98.3 °F)-37.3 °C (99.2 °F)] 37.1 °C (98.7 °F)  Pulse:  [65-80] 72  Resp:  [18-20] 20  BP: (118-140)/(73-88) 124/80  SpO2:  [92 %-98 %] 96 %    Physical Exam  Physical Exam   Constitutional: She is oriented to person, place, and time. She appears well-developed. No distress.   HENT:   Head: Normocephalic.   Eyes: Conjunctivae are normal.   Neck: Neck supple. No JVD present.   Cardiovascular: Normal rate.   Pulmonary/Chest: Effort normal. No respiratory distress. She exhibits no tenderness.   Abdominal: She exhibits no distension. There is tenderness. There is no rebound and no guarding.   (+)bowel sounds  Midline incision approximated with staples   Musculoskeletal: Normal range of motion.   Neurological: She is alert and oriented to person, place, and time.   Skin: Skin is warm and dry.   Nursing note and vitals reviewed.      Laboratory  Recent Results (from the past 24 hour(s))   CBC WITH DIFFERENTIAL    Collection Time: 10/08/19  5:11 AM   Result Value Ref Range    WBC 15.2 (H) 4.8 - 10.8 K/uL    RBC 3.42 (L) 4.20 - 5.40 M/uL    Hemoglobin 11.4 (L) 12.0 - 16.0 g/dL    Hematocrit 34.4 (L) 37.0 - 47.0 %    .6 (H) 81.4 -  97.8 fL    MCH 33.3 (H) 27.0 - 33.0 pg    MCHC 33.1 (L) 33.6 - 35.0 g/dL    RDW 57.6 (H) 35.9 - 50.0 fL    Platelet Count 319 164 - 446 K/uL    MPV 10.1 9.0 - 12.9 fL    Neutrophils-Polys 82.00 (H) 44.00 - 72.00 %    Lymphocytes 6.80 (L) 22.00 - 41.00 %    Monocytes 8.50 0.00 - 13.40 %    Eosinophils 1.50 0.00 - 6.90 %    Basophils 0.30 0.00 - 1.80 %    Immature Granulocytes 0.90 0.00 - 0.90 %    Nucleated RBC 0.00 /100 WBC    Neutrophils (Absolute) 12.49 (H) 2.00 - 7.15 K/uL    Lymphs (Absolute) 1.04 1.00 - 4.80 K/uL    Monos (Absolute) 1.29 (H) 0.00 - 0.85 K/uL    Eos (Absolute) 0.23 0.00 - 0.51 K/uL    Baso (Absolute) 0.04 0.00 - 0.12 K/uL    Immature Granulocytes (abs) 0.14 (H) 0.00 - 0.11 K/uL    NRBC (Absolute) 0.00 K/uL   Comp Metabolic Panel    Collection Time: 10/08/19  5:11 AM   Result Value Ref Range    Sodium 140 135 - 145 mmol/L    Potassium 2.9 (L) 3.6 - 5.5 mmol/L    Chloride 105 96 - 112 mmol/L    Co2 26 20 - 33 mmol/L    Anion Gap 9.0 0.0 - 11.9    Glucose 135 (H) 65 - 99 mg/dL    Bun 3 (L) 8 - 22 mg/dL    Creatinine 0.61 0.50 - 1.40 mg/dL    Calcium 8.2 (L) 8.5 - 10.5 mg/dL    AST(SGOT) 19 12 - 45 U/L    ALT(SGPT) 18 2 - 50 U/L    Alkaline Phosphatase 106 (H) 30 - 99 U/L    Total Bilirubin 0.3 0.1 - 1.5 mg/dL    Albumin 3.1 (L) 3.2 - 4.9 g/dL    Total Protein 6.1 6.0 - 8.2 g/dL    Globulin 3.0 1.9 - 3.5 g/dL    A-G Ratio 1.0 g/dL   MAGNESIUM    Collection Time: 10/08/19  5:11 AM   Result Value Ref Range    Magnesium 1.7 1.5 - 2.5 mg/dL   PHOSPHORUS    Collection Time: 10/08/19  5:11 AM   Result Value Ref Range    Phosphorus 1.6 (L) 2.5 - 4.5 mg/dL   ESTIMATED GFR    Collection Time: 10/08/19  5:11 AM   Result Value Ref Range    GFR If African American >60 >60 mL/min/1.73 m 2    GFR If Non African American >60 >60 mL/min/1.73 m 2       Fluids    Intake/Output Summary (Last 24 hours) at 10/8/2019 1112  Last data filed at 10/8/2019 0800  Gross per 24 hour   Intake 3298.2 ml   Output 1900 ml   Net  1398.2 ml       Core Measures & Quality Metrics  Labs reviewed, Medications reviewed and Radiology images reviewed  Sullivan catheter: No Sullivan      DVT Prophylaxis: Heparin  DVT prophylaxis - mechanical: SCDs  Ulcer prophylaxis: Yes        NAPOLEON Score  ETOH Screening    Assessment/Plan  * Duodenal perforation (HCC)- (present on admission)  Assessment & Plan  Soft tissue gas in the perinephric space of the right kidney tracking into the retroperitoneum posterior to the liver with small quantity of free fluid in the perinephric space  10/4  Laparotomy repair of duodenal perforation, drain which peritoneal collection  10/7 DC NGT, JG drain and Prevena. Sips of clears.   10/8 Advance diet as tolerated. Continue PPI on discharge.  James Dumont MD. General Surgery     HASMUKH (acute kidney injury) (HCC)- (present on admission)  Assessment & Plan  Ongoing resuscitation  Follow parameters        Discussed patient condition with RN, Patient and general surgery. Dr. Dumont

## 2019-10-08 NOTE — PROGRESS NOTES
Pt laying in bed, call light within reach, bed lowered and locked, fall education reinforced. Pt IV is clean,dry,intact, and infusing the appropriate fluids. Pt lung sounds are diminished in all lobes, bowel sounds are normoactive in all four quadrants, heart sounds are within defined limits. Pt is up stand-by assist with a steady gait and is continent. Pt reports continuing pain in the abdomen with prn pain medication given. Pt has a midline incision approximated with staples and KATHI with no drainage. Pt has an old RLQ JG drain with gauze and tegaderm in place CDI. PCA D/C by dayshift RN.

## 2019-10-08 NOTE — THERAPY
"Physical Therapy Evaluation completed.   Bed Mobility:  Supine to Sit: Supervised  Transfers: Sit to Stand: Supervised  Gait: Level Of Assist: Supervised with No Equipment Needed       Plan of Care: Patient with no further skilled PT needs in the acute care setting at this time  Discharge Recommendations: Equipment: No Equipment Needed. Anticipate that the patient will have no further physical therapy needs after discharge from the hospital.    See \"Rehab Therapy-Acute\" Patient Summary Report for complete documentation.     Pt was recently admitted for retroperitoneal air and is now s/p laparotomy. Pt was able to demonstrate SPV for all functional mobility with no vaishali LOB. Pt held on to IV pole during ambulation and reports being at baseline with functional mobility. Pt demonstrated with dec in SpO2 on RA down to 87%, however, when put back on 2L O2 pt was able to recover back to 96% SpO2. RN aware of concerns. Pt is in no acute skilled PT needs at this time, anticipate pt to d/c home with family support once medically clear.   "

## 2019-10-08 NOTE — THERAPY
"Speech Language Therapy Clinical Swallow Evaluation completed.    Functional Status: Patient was seen on this date for a clinical swallow evaluation. Pt currently \"NPO\" but ok to have ice chips and sips of clears. Per RN, OK to proceed with evaluation. Patient AAOx3 with confusion regarding day. Pt on 1 L nasal cannula. Pt with whispered speech but able to phonate with cue for sustained /i/ which revealed clear but minimally hoarse vocal quality. Pt reported voice fluctuates and is baseline from \"many years ago.\" Oral motor examination revealed no gross asymmetry or weakness. Pt wears upper full denture plate only with missing molars on the bottom. PO trials consisted of 2 sips of NTL water and 4 oz thin liquids via single sips from the cup. Swallow trigger was timely and laryngeal elevation complete to palpation.,m No overt s/sx of aspiration appreciated across all PO trials tested. No changes to vocal quality or respiratory status. She had one episode of belching which may be indicative of esophageal dysfunction/dysmotility - pt carries hx of GERD. Education provided to pt regarding current status and SLP recs.     Recommendations - Diet: At this time, patient appears to be tolerating thin liquids without any concerns for aspiration. Recommend continue sips of clears (thins). Will test solids as medically appropriate. SLP following closely.                             Strategies: Monitor during meals and Head of Bed at 90 Degrees                            Medication Administration: Whole with Liquid Wash     Plan of Care: Will benefit from Speech Therapy 3 times per week    Post-Acute Therapy: Recommend inpatient transitional care services for continued speech therapy services.      See \"Rehab Therapy-Acute\" Patient Summary Report for complete documentation. Thank you for the consult.       "

## 2019-10-09 PROBLEM — N17.9 AKI (ACUTE KIDNEY INJURY) (HCC): Status: RESOLVED | Noted: 2019-10-04 | Resolved: 2019-10-09

## 2019-10-09 LAB
ANION GAP SERPL CALC-SCNC: 7 MMOL/L (ref 0–11.9)
BACTERIA BLD CULT: NORMAL
BACTERIA BLD CULT: NORMAL
BASOPHILS # BLD AUTO: 0 % (ref 0–1.8)
BASOPHILS # BLD: 0 K/UL (ref 0–0.12)
BUN SERPL-MCNC: <3 MG/DL (ref 8–22)
CALCIUM SERPL-MCNC: 7.8 MG/DL (ref 8.5–10.5)
CHLORIDE SERPL-SCNC: 107 MMOL/L (ref 96–112)
CO2 SERPL-SCNC: 25 MMOL/L (ref 20–33)
CREAT SERPL-MCNC: 0.57 MG/DL (ref 0.5–1.4)
EOSINOPHIL # BLD AUTO: 0.24 K/UL (ref 0–0.51)
EOSINOPHIL NFR BLD: 1.7 % (ref 0–6.9)
ERYTHROCYTE [DISTWIDTH] IN BLOOD BY AUTOMATED COUNT: 57.1 FL (ref 35.9–50)
GLUCOSE SERPL-MCNC: 126 MG/DL (ref 65–99)
HCT VFR BLD AUTO: 32 % (ref 37–47)
HGB BLD-MCNC: 10.5 G/DL (ref 12–16)
LYMPHOCYTES # BLD AUTO: 1.57 K/UL (ref 1–4.8)
LYMPHOCYTES NFR BLD: 11.3 % (ref 22–41)
MAGNESIUM SERPL-MCNC: 2.1 MG/DL (ref 1.5–2.5)
MANUAL DIFF BLD: NORMAL
MCH RBC QN AUTO: 32.7 PG (ref 27–33)
MCHC RBC AUTO-ENTMCNC: 32.8 G/DL (ref 33.6–35)
MCV RBC AUTO: 99.7 FL (ref 81.4–97.8)
MONOCYTES # BLD AUTO: 1.33 K/UL (ref 0–0.85)
MONOCYTES NFR BLD AUTO: 9.6 % (ref 0–13.4)
MORPHOLOGY BLD-IMP: NORMAL
NEUTROPHILS # BLD AUTO: 10.76 K/UL (ref 2–7.15)
NEUTROPHILS NFR BLD: 77.4 % (ref 44–72)
NRBC # BLD AUTO: 0 K/UL
NRBC BLD-RTO: 0 /100 WBC
PHOSPHATE SERPL-MCNC: 1.9 MG/DL (ref 2.5–4.5)
PLATELET # BLD AUTO: 316 K/UL (ref 164–446)
PLATELET BLD QL SMEAR: NORMAL
PMV BLD AUTO: 10 FL (ref 9–12.9)
POTASSIUM SERPL-SCNC: 3.3 MMOL/L (ref 3.6–5.5)
RBC # BLD AUTO: 3.21 M/UL (ref 4.2–5.4)
SIGNIFICANT IND 70042: NORMAL
SIGNIFICANT IND 70042: NORMAL
SITE SITE: NORMAL
SITE SITE: NORMAL
SODIUM SERPL-SCNC: 139 MMOL/L (ref 135–145)
SOURCE SOURCE: NORMAL
SOURCE SOURCE: NORMAL
WBC # BLD AUTO: 13.9 K/UL (ref 4.8–10.8)

## 2019-10-09 PROCEDURE — 700102 HCHG RX REV CODE 250 W/ 637 OVERRIDE(OP): Performed by: NURSE PRACTITIONER

## 2019-10-09 PROCEDURE — 36415 COLL VENOUS BLD VENIPUNCTURE: CPT

## 2019-10-09 PROCEDURE — 80048 BASIC METABOLIC PNL TOTAL CA: CPT

## 2019-10-09 PROCEDURE — 700102 HCHG RX REV CODE 250 W/ 637 OVERRIDE(OP): Performed by: INTERNAL MEDICINE

## 2019-10-09 PROCEDURE — 85027 COMPLETE CBC AUTOMATED: CPT

## 2019-10-09 PROCEDURE — 700101 HCHG RX REV CODE 250: Performed by: INTERNAL MEDICINE

## 2019-10-09 PROCEDURE — 83735 ASSAY OF MAGNESIUM: CPT

## 2019-10-09 PROCEDURE — A9270 NON-COVERED ITEM OR SERVICE: HCPCS | Performed by: INTERNAL MEDICINE

## 2019-10-09 PROCEDURE — 99232 SBSQ HOSP IP/OBS MODERATE 35: CPT | Performed by: HOSPITALIST

## 2019-10-09 PROCEDURE — 700112 HCHG RX REV CODE 229: Performed by: HOSPITALIST

## 2019-10-09 PROCEDURE — A9270 NON-COVERED ITEM OR SERVICE: HCPCS | Performed by: NURSE PRACTITIONER

## 2019-10-09 PROCEDURE — 84100 ASSAY OF PHOSPHORUS: CPT

## 2019-10-09 PROCEDURE — 700102 HCHG RX REV CODE 250 W/ 637 OVERRIDE(OP): Performed by: HOSPITALIST

## 2019-10-09 PROCEDURE — 770001 HCHG ROOM/CARE - MED/SURG/GYN PRIV*

## 2019-10-09 PROCEDURE — C9113 INJ PANTOPRAZOLE SODIUM, VIA: HCPCS | Performed by: INTERNAL MEDICINE

## 2019-10-09 PROCEDURE — A9270 NON-COVERED ITEM OR SERVICE: HCPCS | Performed by: HOSPITALIST

## 2019-10-09 PROCEDURE — 92526 ORAL FUNCTION THERAPY: CPT

## 2019-10-09 PROCEDURE — 700111 HCHG RX REV CODE 636 W/ 250 OVERRIDE (IP): Performed by: INTERNAL MEDICINE

## 2019-10-09 PROCEDURE — 85007 BL SMEAR W/DIFF WBC COUNT: CPT

## 2019-10-09 PROCEDURE — 700101 HCHG RX REV CODE 250: Performed by: HOSPITALIST

## 2019-10-09 RX ORDER — OMEPRAZOLE 20 MG/1
20 CAPSULE, DELAYED RELEASE ORAL EVERY 12 HOURS
Status: DISCONTINUED | OUTPATIENT
Start: 2019-10-09 | End: 2019-10-14 | Stop reason: HOSPADM

## 2019-10-09 RX ORDER — DOCUSATE SODIUM 100 MG/1
100 CAPSULE, LIQUID FILLED ORAL
Status: DISCONTINUED | OUTPATIENT
Start: 2019-10-09 | End: 2019-10-14 | Stop reason: HOSPADM

## 2019-10-09 RX ORDER — PAROXETINE HYDROCHLORIDE 20 MG/1
60 TABLET, FILM COATED ORAL EVERY EVENING
Status: DISCONTINUED | OUTPATIENT
Start: 2019-10-09 | End: 2019-10-14 | Stop reason: HOSPADM

## 2019-10-09 RX ORDER — QUETIAPINE FUMARATE 25 MG/1
100 TABLET, FILM COATED ORAL EVERY EVENING
Status: DISCONTINUED | OUTPATIENT
Start: 2019-10-09 | End: 2019-10-14 | Stop reason: HOSPADM

## 2019-10-09 RX ORDER — CALCIUM POLYCARBOPHIL 625 MG 625 MG/1
625 TABLET ORAL
Status: DISCONTINUED | OUTPATIENT
Start: 2019-10-09 | End: 2019-10-14 | Stop reason: HOSPADM

## 2019-10-09 RX ORDER — POTASSIUM CHLORIDE 20 MEQ/1
40 TABLET, EXTENDED RELEASE ORAL 2 TIMES DAILY
Status: DISCONTINUED | OUTPATIENT
Start: 2019-10-09 | End: 2019-10-13

## 2019-10-09 RX ADMIN — OXYCODONE HYDROCHLORIDE 10 MG: 5 TABLET ORAL at 12:29

## 2019-10-09 RX ADMIN — POTASSIUM CHLORIDE 40 MEQ: 1500 TABLET, EXTENDED RELEASE ORAL at 17:28

## 2019-10-09 RX ADMIN — QUETIAPINE FUMARATE 100 MG: 100 TABLET ORAL at 18:48

## 2019-10-09 RX ADMIN — PANTOPRAZOLE SODIUM 40 MG: 40 INJECTION, POWDER, FOR SOLUTION INTRAVENOUS at 04:19

## 2019-10-09 RX ADMIN — HEPARIN SODIUM 5000 UNITS: 5000 INJECTION, SOLUTION INTRAVENOUS; SUBCUTANEOUS at 21:05

## 2019-10-09 RX ADMIN — ACETAMINOPHEN 650 MG: 325 TABLET, FILM COATED ORAL at 17:32

## 2019-10-09 RX ADMIN — OXYCODONE HYDROCHLORIDE 10 MG: 5 TABLET ORAL at 04:20

## 2019-10-09 RX ADMIN — DEXTROSE MONOHYDRATE, SODIUM CHLORIDE, SODIUM LACTATE, POTASSIUM CHLORIDE, CALCIUM CHLORIDE: 5; 600; 310; 179; 20 INJECTION, SOLUTION INTRAVENOUS at 17:26

## 2019-10-09 RX ADMIN — POTASSIUM CHLORIDE 40 MEQ: 1500 TABLET, EXTENDED RELEASE ORAL at 04:19

## 2019-10-09 RX ADMIN — ACETAMINOPHEN 650 MG: 325 TABLET, FILM COATED ORAL at 04:20

## 2019-10-09 RX ADMIN — OXYCODONE HYDROCHLORIDE 10 MG: 5 TABLET ORAL at 08:22

## 2019-10-09 RX ADMIN — DIBASIC SODIUM PHOSPHATE, MONOBASIC POTASSIUM PHOSPHATE AND MONOBASIC SODIUM PHOSPHATE 2 TABLET: 852; 155; 130 TABLET ORAL at 17:30

## 2019-10-09 RX ADMIN — METRONIDAZOLE 500 MG: 500 INJECTION, SOLUTION INTRAVENOUS at 04:19

## 2019-10-09 RX ADMIN — DOCUSATE SODIUM 100 MG: 100 CAPSULE, LIQUID FILLED ORAL at 21:06

## 2019-10-09 RX ADMIN — OXYCODONE HYDROCHLORIDE 10 MG: 5 TABLET ORAL at 16:59

## 2019-10-09 RX ADMIN — OMEPRAZOLE 20 MG: 20 CAPSULE, DELAYED RELEASE ORAL at 17:28

## 2019-10-09 RX ADMIN — DEXTROSE MONOHYDRATE, SODIUM CHLORIDE, SODIUM LACTATE, POTASSIUM CHLORIDE, CALCIUM CHLORIDE: 5; 600; 310; 179; 20 INJECTION, SOLUTION INTRAVENOUS at 04:27

## 2019-10-09 RX ADMIN — PAROXETINE HYDROCHLORIDE 60 MG: 20 TABLET, FILM COATED ORAL at 18:48

## 2019-10-09 RX ADMIN — HEPARIN SODIUM 5000 UNITS: 5000 INJECTION, SOLUTION INTRAVENOUS; SUBCUTANEOUS at 14:03

## 2019-10-09 RX ADMIN — DIBASIC SODIUM PHOSPHATE, MONOBASIC POTASSIUM PHOSPHATE AND MONOBASIC SODIUM PHOSPHATE 2 TABLET: 852; 155; 130 TABLET ORAL at 10:23

## 2019-10-09 RX ADMIN — OXYCODONE HYDROCHLORIDE 10 MG: 5 TABLET ORAL at 21:06

## 2019-10-09 RX ADMIN — HEPARIN SODIUM 5000 UNITS: 5000 INJECTION, SOLUTION INTRAVENOUS; SUBCUTANEOUS at 04:19

## 2019-10-09 ASSESSMENT — ENCOUNTER SYMPTOMS
BACK PAIN: 0
PALPITATIONS: 0
NAUSEA: 0
VOMITING: 0
WHEEZING: 0
FEVER: 0
SHORTNESS OF BREATH: 0
FLANK PAIN: 0
STRIDOR: 0
COUGH: 0
WEAKNESS: 0
FOCAL WEAKNESS: 0
MYALGIAS: 1
SPEECH CHANGE: 0
NECK PAIN: 0
EYE REDNESS: 0
DIARRHEA: 0
DIAPHORESIS: 0
EYE DISCHARGE: 0
ABDOMINAL PAIN: 1
CHILLS: 0

## 2019-10-09 NOTE — PROGRESS NOTES
Trauma / Surgical Daily Progress Note    Date of Service  10/9/2019    Chief Complaint  49 y.o. female admitted 10/4/2019 with Retroperitoneal air    Interval Events    WBC trend down   Hoarseness improved   (+) flatus (-) BM     - Advance diet as tolerated  - Electrolyte replacement per medicine   - Surgery to sign off, please reconsult should the need arise. Discussed with Dr. Kavon Bangura  - Follow up Dr. Dumont, one weeks time, post discharge  - Counseled     Review of Systems  Review of Systems   Constitutional: Negative for chills and fever.   HENT:        Hoarse voice improved    Respiratory: Negative for shortness of breath.    Cardiovascular: Negative for chest pain.   Gastrointestinal: Positive for abdominal pain (midline). Negative for nausea and vomiting.        (+) Flatus  (-) BM   Genitourinary: Negative for dysuria.   Musculoskeletal: Positive for myalgias.   Neurological: Negative for focal weakness.        Vital Signs  Temp:  [36.1 °C (97 °F)-36.9 °C (98.5 °F)] 36.9 °C (98.5 °F)  Pulse:  [67-76] 67  Resp:  [16-18] 16  BP: ()/(68-79) 98/68  SpO2:  [91 %-93 %] 91 %    Physical Exam  Physical Exam   Constitutional: She is oriented to person, place, and time. She appears well-developed. No distress.   HENT:   Head: Normocephalic.   Eyes: Conjunctivae are normal.   Neck: Neck supple. No JVD present.   Cardiovascular: Normal rate.   Pulmonary/Chest: Effort normal. No respiratory distress. She exhibits no tenderness.   Abdominal: She exhibits no distension. There is tenderness. There is no rebound and no guarding.   (+)bowel sounds  Midline incision approximated with staples   Musculoskeletal: Normal range of motion.   Neurological: She is alert and oriented to person, place, and time.   Skin: Skin is warm and dry.   Nursing note and vitals reviewed.      Laboratory  Recent Results (from the past 24 hour(s))   Basic Metabolic Panel    Collection Time: 10/08/19  4:02 PM   Result Value Ref Range     Sodium 137 135 - 145 mmol/L    Potassium 3.1 (L) 3.6 - 5.5 mmol/L    Chloride 103 96 - 112 mmol/L    Co2 24 20 - 33 mmol/L    Glucose 134 (H) 65 - 99 mg/dL    Bun <3 (L) 8 - 22 mg/dL    Creatinine 0.55 0.50 - 1.40 mg/dL    Calcium 7.8 (L) 8.5 - 10.5 mg/dL    Anion Gap 10.0 0.0 - 11.9   ESTIMATED GFR    Collection Time: 10/08/19  4:02 PM   Result Value Ref Range    GFR If African American >60 >60 mL/min/1.73 m 2    GFR If Non African American >60 >60 mL/min/1.73 m 2   Basic Metabolic Panel    Collection Time: 10/09/19  4:11 AM   Result Value Ref Range    Sodium 139 135 - 145 mmol/L    Potassium 3.3 (L) 3.6 - 5.5 mmol/L    Chloride 107 96 - 112 mmol/L    Co2 25 20 - 33 mmol/L    Glucose 126 (H) 65 - 99 mg/dL    Bun <3 (L) 8 - 22 mg/dL    Creatinine 0.57 0.50 - 1.40 mg/dL    Calcium 7.8 (L) 8.5 - 10.5 mg/dL    Anion Gap 7.0 0.0 - 11.9   MAGNESIUM    Collection Time: 10/09/19  4:11 AM   Result Value Ref Range    Magnesium 2.1 1.5 - 2.5 mg/dL   PHOSPHORUS    Collection Time: 10/09/19  4:11 AM   Result Value Ref Range    Phosphorus 1.9 (L) 2.5 - 4.5 mg/dL   CBC WITH DIFFERENTIAL    Collection Time: 10/09/19  4:11 AM   Result Value Ref Range    WBC 13.9 (H) 4.8 - 10.8 K/uL    RBC 3.21 (L) 4.20 - 5.40 M/uL    Hemoglobin 10.5 (L) 12.0 - 16.0 g/dL    Hematocrit 32.0 (L) 37.0 - 47.0 %    MCV 99.7 (H) 81.4 - 97.8 fL    MCH 32.7 27.0 - 33.0 pg    MCHC 32.8 (L) 33.6 - 35.0 g/dL    RDW 57.1 (H) 35.9 - 50.0 fL    Platelet Count 316 164 - 446 K/uL    MPV 10.0 9.0 - 12.9 fL    Neutrophils-Polys 77.40 (H) 44.00 - 72.00 %    Lymphocytes 11.30 (L) 22.00 - 41.00 %    Monocytes 9.60 0.00 - 13.40 %    Eosinophils 1.70 0.00 - 6.90 %    Basophils 0.00 0.00 - 1.80 %    Nucleated RBC 0.00 /100 WBC    Neutrophils (Absolute) 10.76 (H) 2.00 - 7.15 K/uL    Lymphs (Absolute) 1.57 1.00 - 4.80 K/uL    Monos (Absolute) 1.33 (H) 0.00 - 0.85 K/uL    Eos (Absolute) 0.24 0.00 - 0.51 K/uL    Baso (Absolute) 0.00 0.00 - 0.12 K/uL    NRBC (Absolute) 0.00  K/uL   ESTIMATED GFR    Collection Time: 10/09/19  4:11 AM   Result Value Ref Range    GFR If African American >60 >60 mL/min/1.73 m 2    GFR If Non African American >60 >60 mL/min/1.73 m 2   DIFFERENTIAL MANUAL    Collection Time: 10/09/19  4:11 AM   Result Value Ref Range    Manual Diff Status PERFORMED    PERIPHERAL SMEAR REVIEW    Collection Time: 10/09/19  4:11 AM   Result Value Ref Range    Peripheral Smear Review see below    PLATELET ESTIMATE    Collection Time: 10/09/19  4:11 AM   Result Value Ref Range    Plt Estimation Normal        Fluids    Intake/Output Summary (Last 24 hours) at 10/9/2019 0915  Last data filed at 10/9/2019 0400  Gross per 24 hour   Intake 2850 ml   Output 2550 ml   Net 300 ml       Core Measures & Quality Metrics  Labs reviewed, Medications reviewed and Radiology images reviewed  Sullivan catheter: No Sullivan      DVT Prophylaxis: Heparin  DVT prophylaxis - mechanical: SCDs  Ulcer prophylaxis: Yes        Total Score: 0    ETOH Screening    Assessment/Plan  * Duodenal perforation (HCC)- (present on admission)  Assessment & Plan  Soft tissue gas in the perinephric space of the right kidney tracking into the retroperitoneum posterior to the liver with small quantity of free fluid in the perinephric space  10/4  Laparotomy repair of duodenal perforation, drain which peritoneal collection  10/7 DC NGT, JG drain and Prevena. Sips of clears.   10/8 Advance diet as tolerated. Continue PPI on discharge.  James Dumont MD. General Surgery        Discussed patient condition with Patient and trauma surgery, Dr. James Dumont.

## 2019-10-09 NOTE — PROGRESS NOTES
AOx4, pleasant   VSS  Pain #6/10-medicated per mar  Tolerating full liquid diet  Poor appetite, does not want to advance diet at this time  -BM/+flatus/no nausea  Voiding adequately  Ambulating without assistance  Midline incision with staples KATHI  Olg rlq perez drain site with gauze and tegarderm  Right hand without fingers, only has thumb left. Well healed  Left hand with contracted fingers but still able to functional  Callbell within reach  Bed locked, siderails up x2

## 2019-10-09 NOTE — THERAPY
"Speech Language Therapy dysphagia treatment completed.     Functional Status: Patient was seen on this date for dysphagia treatment. Reportedly, patient was able to cough up a mucous plug. This resulted in significant improvement in vocal quality - now back to baseline per pt. Pt reluctant to advance diet due to \"poor appetite\" but was encouraged and eventually agreeable to trial solids. PO consisted of thin liquids, applesauce, soft solids in mixed consistency form, and dry solids. No overt s/sx of aspiration appreciated across all consistencies and textures tested. Mild oral residue following dry solids and pt independently used a liquid wash to clear. Pt reported she eating soft foods at baseline due to poor dentition. Pt agreeable to advance diet to mechanical soft diet. Recommend D3/thins.     Recommendations: Dysphagia III, Thin Liquids    Plan of Care: Will benefit from Speech Therapy 3 times per week    Post-Acute Therapy: Recommend outpatient or home health transitional care services for continued speech therapy services    See \"Rehab Therapy-Acute\" Patient Summary Report for complete documentation.     "

## 2019-10-09 NOTE — PROGRESS NOTES
[facility] Medicine Daily Progress Note    Date of Service  10/9/2019    Chief Complaint  49 y.o. female admitted 10/4/2019 with abdominal pain.     Hospital Course      Hx of  rheumatoid arthritis, GERD, osteoporosis, chronic pain, asthma/COPD, depression anxiety, tobacco dependence admitted with CT findings of abdominal abscess/free air and concern for duodenal perforation with acute kidney injury.  Patient had emergent exploratory laparotomy revealing duodenal perforation status post repair on 10/4/2019 and treated in ICU.    Interval Problem Update    Tolerating clears, no nausea/vomiting.  Abdominal pain better.  Hypokalemia and hypophosphatemia improved.     Consultants/Specialty    Dr Fonseca, surgery     Code Status  Full code.     Disposition  Plan continue IV atbs, PT/OT    Review of Systems  Review of Systems   Constitutional: Negative for chills, diaphoresis, fever and malaise/fatigue.   HENT: Negative for congestion.    Eyes: Negative for discharge and redness.   Respiratory: Negative for cough, shortness of breath, wheezing and stridor.    Cardiovascular: Negative for chest pain, palpitations and leg swelling.   Gastrointestinal: Positive for abdominal pain (Mild). Negative for diarrhea, nausea and vomiting.   Genitourinary: Negative for flank pain and hematuria.   Musculoskeletal: Positive for joint pain. Negative for back pain and neck pain.         chronic arthritic pains of hands, shoulders   Neurological: Negative for speech change, focal weakness and weakness.        Physical Exam  Temp:  [36.1 °C (97 °F)-36.9 °C (98.5 °F)] 36.9 °C (98.5 °F)  Pulse:  [67-76] 67  Resp:  [16-18] 16  BP: ()/(68-79) 98/68  SpO2:  [91 %-93 %] 91 %    Physical Exam   Constitutional: She is oriented to person, place, and time. No distress.   HENT:   Head: Normocephalic and atraumatic.   Eyes: EOM are normal. Right eye exhibits no discharge. Left eye exhibits no discharge. No scleral icterus.   Neck: Neck supple.    Cardiovascular: Normal rate and regular rhythm.   No murmur heard.  Pulmonary/Chest: No stridor. No respiratory distress. She has no wheezes. She has no rales.   Abdominal: Soft. Bowel sounds are normal. She exhibits no distension. There is no tenderness.   Midline  surgical incision w staples present- no dehisc, redness , normal BS.   Musculoskeletal: She exhibits deformity (Right 2nd thru 5th finger amputations). She exhibits no edema or tenderness.   Neurological: She is alert and oriented to person, place, and time.   No gross focal weakness   Skin: Skin is warm and dry. She is not diaphoretic. No pallor.   Psychiatric: She has a normal mood and affect. Her behavior is normal.   Vitals reviewed.      Fluids    Intake/Output Summary (Last 24 hours) at 10/9/2019 1332  Last data filed at 10/9/2019 1100  Gross per 24 hour   Intake 2520 ml   Output 2900 ml   Net -380 ml       Laboratory  Recent Labs     10/07/19  0431 10/08/19  0511 10/09/19  0411   WBC 16.2* 15.2* 13.9*   RBC 3.04* 3.42* 3.21*   HEMOGLOBIN 10.0* 11.4* 10.5*   HEMATOCRIT 30.9* 34.4* 32.0*   .6* 100.6* 99.7*   MCH 32.9 33.3* 32.7   MCHC 32.4* 33.1* 32.8*   RDW 58.7* 57.6* 57.1*   PLATELETCT 294 319 316   MPV 10.0 10.1 10.0     Recent Labs     10/08/19  0511 10/08/19  1602 10/09/19  0411   SODIUM 140 137 139   POTASSIUM 2.9* 3.1* 3.3*   CHLORIDE 105 103 107   CO2 26 24 25   GLUCOSE 135* 134* 126*   BUN 3* <3* <3*   CREATININE 0.61 0.55 0.57   CALCIUM 8.2* 7.8* 7.8*     Recent Labs     10/06/19  1536   APTT 39.4*   INR 1.36*               Imaging  CT-ABDOMEN-PELVIS WITH   Final Result   Addendum 1 of 1   Addendum: There is focus of air seen adjacent to the second portion of the    duodenum, the duodenal wall appears slightly thickened. Consider duodenal    ulcer/perforation as etiology of these findings.      These findings were discussed with the patient's clinician, MARSHALL DEL VALLE,    on 10/4/2019 7:27 AM.      Final            Assessment/Plan  * Duodenal perforation (HCC)- (present on admission)  Assessment & Plan  Status post exploratory laparotomy and surgical repair 10/4-clinically doing well.  Decreased abdominal pain, tolerating liquids  Continue empiric antibiotics, ceftriaxone and Flagyl and de-escalate as clinically appropriate  Change to oral PPI.  Advance diet to GI soft dysphagia diet as tolerated  Pain control with PRN oxycodone--try to transition off IV narcotics  Continue IV fluids until consistent intake.  Monitor electrolytes.    Hypophosphatemia  Assessment & Plan  Persistent, improved  Tolerating orals, start Neutra-Phos  Monitor levels as will be risk for refeeding syndrome    Hypokalemia- (present on admission)  Assessment & Plan  Continue with oral potassium  Continue IV maintenance fluids with potassium until consistent intake  Follow-up BMP    Hoarseness- (present on admission)  Assessment & Plan  Postprocedure, could be anesthesia/intubation related-resolving  Continue with Dysphagia 3 diet , SLP evaluation  Stable respiratory symptoms, continue close clinical monitoring  If not improving, ENT evaluation    Chronic pain  Assessment & Plan  Assoc with arthritis   Resume Neurontin.      Rheumatoid arthritis (HCC)- (present on admission)  Assessment & Plan  History of with chronic joint pain.  PRN oxycodone, Tylenol  Outpatient rheumatology follow-up    Tobacco dependence- (present on admission)  Assessment & Plan  Patient has been counseled and educated regarding cessation  Continue to emphasize cessation    Leukocytosis- (present on admission)  Assessment & Plan  Associate with duodenal perforation.  Blood cultures negative.  Afebrile  No acute symptoms of infection, likely reactive  Follow clinically       VTE prophylaxis: Heparin Sc.     Discussed w surgery plan of care.

## 2019-10-09 NOTE — PROGRESS NOTES
Pt laying in bed, call light within reach, bed lowered and locked, fall education reinforced. Pt is A&Ox4 and on 1L of oxygen via nasal cannula. Pt IV is clean,dry,intact, and infusing the appropriate fluids. Pt lung sounds are diminished in lower lobes, bowel sounds are normoactive in all four quadrants, heart sounds are within defined limits. Pt is up stand-by assist with a steady gait and is continent. Pt reports continuing pain in the abdomen with prn pain medication given. Pt has a midline incision approximated with staples and KATHI with no drainage. Pt has an old RLQ JG drain with gauze and tegaderm in place CDI. Pt is tolerating a clear liquid diet and is tolerating oral pain medication with no nausea. .

## 2019-10-10 ENCOUNTER — APPOINTMENT (OUTPATIENT)
Dept: RADIOLOGY | Facility: MEDICAL CENTER | Age: 49
DRG: 329 | End: 2019-10-10
Attending: HOSPITALIST
Payer: MEDICAID

## 2019-10-10 ENCOUNTER — APPOINTMENT (OUTPATIENT)
Dept: RADIOLOGY | Facility: MEDICAL CENTER | Age: 49
DRG: 329 | End: 2019-10-10
Attending: INTERNAL MEDICINE
Payer: MEDICAID

## 2019-10-10 PROBLEM — R09.02 HYPOXIA: Status: ACTIVE | Noted: 2019-10-10

## 2019-10-10 PROBLEM — J96.01 ACUTE HYPOXEMIC RESPIRATORY FAILURE (HCC): Status: ACTIVE | Noted: 2019-10-10

## 2019-10-10 PROBLEM — R93.89 ABNORMAL CHEST X-RAY: Status: ACTIVE | Noted: 2019-10-10

## 2019-10-10 LAB
ANION GAP SERPL CALC-SCNC: 7 MMOL/L (ref 0–11.9)
ANISOCYTOSIS BLD QL SMEAR: ABNORMAL
BASOPHILS # BLD AUTO: 1.7 % (ref 0–1.8)
BASOPHILS # BLD: 0.21 K/UL (ref 0–0.12)
BUN SERPL-MCNC: 3 MG/DL (ref 8–22)
CALCIUM SERPL-MCNC: 8.3 MG/DL (ref 8.5–10.5)
CHLORIDE SERPL-SCNC: 106 MMOL/L (ref 96–112)
CO2 SERPL-SCNC: 25 MMOL/L (ref 20–33)
CREAT SERPL-MCNC: 0.55 MG/DL (ref 0.5–1.4)
EOSINOPHIL # BLD AUTO: 0.11 K/UL (ref 0–0.51)
EOSINOPHIL NFR BLD: 0.9 % (ref 0–6.9)
ERYTHROCYTE [DISTWIDTH] IN BLOOD BY AUTOMATED COUNT: 58.4 FL (ref 35.9–50)
GLUCOSE SERPL-MCNC: 121 MG/DL (ref 65–99)
HCT VFR BLD AUTO: 32.4 % (ref 37–47)
HGB BLD-MCNC: 10.5 G/DL (ref 12–16)
LYMPHOCYTES # BLD AUTO: 0.87 K/UL (ref 1–4.8)
LYMPHOCYTES NFR BLD: 6.9 % (ref 22–41)
MANUAL DIFF BLD: NORMAL
MCH RBC QN AUTO: 32.9 PG (ref 27–33)
MCHC RBC AUTO-ENTMCNC: 32.4 G/DL (ref 33.6–35)
MCV RBC AUTO: 101.6 FL (ref 81.4–97.8)
MONOCYTES # BLD AUTO: 0.77 K/UL (ref 0–0.85)
MONOCYTES NFR BLD AUTO: 6.1 % (ref 0–13.4)
MORPHOLOGY BLD-IMP: NORMAL
NEUTROPHILS # BLD AUTO: 10.63 K/UL (ref 2–7.15)
NEUTROPHILS NFR BLD: 83.5 % (ref 44–72)
NEUTS BAND NFR BLD MANUAL: 0.9 % (ref 0–10)
NRBC # BLD AUTO: 0 K/UL
NRBC BLD-RTO: 0 /100 WBC
NT-PROBNP SERPL IA-MCNC: 132 PG/ML (ref 0–125)
PLATELET # BLD AUTO: 286 K/UL (ref 164–446)
PLATELET BLD QL SMEAR: NORMAL
PMV BLD AUTO: 10.3 FL (ref 9–12.9)
POLYCHROMASIA BLD QL SMEAR: NORMAL
POTASSIUM SERPL-SCNC: 3.9 MMOL/L (ref 3.6–5.5)
PROCALCITONIN SERPL-MCNC: 0.19 NG/ML
RBC # BLD AUTO: 3.19 M/UL (ref 4.2–5.4)
RBC BLD AUTO: PRESENT
SODIUM SERPL-SCNC: 138 MMOL/L (ref 135–145)
WBC # BLD AUTO: 12.6 K/UL (ref 4.8–10.8)

## 2019-10-10 PROCEDURE — 700117 HCHG RX CONTRAST REV CODE 255: Performed by: INTERNAL MEDICINE

## 2019-10-10 PROCEDURE — 94640 AIRWAY INHALATION TREATMENT: CPT

## 2019-10-10 PROCEDURE — 84145 PROCALCITONIN (PCT): CPT

## 2019-10-10 PROCEDURE — A9270 NON-COVERED ITEM OR SERVICE: HCPCS | Performed by: HOSPITALIST

## 2019-10-10 PROCEDURE — 71045 X-RAY EXAM CHEST 1 VIEW: CPT

## 2019-10-10 PROCEDURE — 71275 CT ANGIOGRAPHY CHEST: CPT

## 2019-10-10 PROCEDURE — A9270 NON-COVERED ITEM OR SERVICE: HCPCS | Performed by: NURSE PRACTITIONER

## 2019-10-10 PROCEDURE — 85027 COMPLETE CBC AUTOMATED: CPT

## 2019-10-10 PROCEDURE — 83880 ASSAY OF NATRIURETIC PEPTIDE: CPT

## 2019-10-10 PROCEDURE — 700111 HCHG RX REV CODE 636 W/ 250 OVERRIDE (IP): Performed by: HOSPITALIST

## 2019-10-10 PROCEDURE — 700105 HCHG RX REV CODE 258: Performed by: INTERNAL MEDICINE

## 2019-10-10 PROCEDURE — 84484 ASSAY OF TROPONIN QUANT: CPT

## 2019-10-10 PROCEDURE — 770022 HCHG ROOM/CARE - ICU (200)

## 2019-10-10 PROCEDURE — 700101 HCHG RX REV CODE 250: Performed by: INTERNAL MEDICINE

## 2019-10-10 PROCEDURE — 700102 HCHG RX REV CODE 250 W/ 637 OVERRIDE(OP): Performed by: HOSPITALIST

## 2019-10-10 PROCEDURE — 85007 BL SMEAR W/DIFF WBC COUNT: CPT

## 2019-10-10 PROCEDURE — 99291 CRITICAL CARE FIRST HOUR: CPT | Performed by: INTERNAL MEDICINE

## 2019-10-10 PROCEDURE — 700101 HCHG RX REV CODE 250: Performed by: HOSPITALIST

## 2019-10-10 PROCEDURE — 93005 ELECTROCARDIOGRAM TRACING: CPT | Performed by: INTERNAL MEDICINE

## 2019-10-10 PROCEDURE — 80048 BASIC METABOLIC PNL TOTAL CA: CPT

## 2019-10-10 PROCEDURE — 700102 HCHG RX REV CODE 250 W/ 637 OVERRIDE(OP): Performed by: NURSE PRACTITIONER

## 2019-10-10 PROCEDURE — 700111 HCHG RX REV CODE 636 W/ 250 OVERRIDE (IP): Performed by: INTERNAL MEDICINE

## 2019-10-10 PROCEDURE — 99292 CRITICAL CARE ADDL 30 MIN: CPT | Performed by: INTERNAL MEDICINE

## 2019-10-10 PROCEDURE — 99233 SBSQ HOSP IP/OBS HIGH 50: CPT | Performed by: HOSPITALIST

## 2019-10-10 RX ORDER — IPRATROPIUM BROMIDE AND ALBUTEROL SULFATE 2.5; .5 MG/3ML; MG/3ML
3 SOLUTION RESPIRATORY (INHALATION)
Status: DISCONTINUED | OUTPATIENT
Start: 2019-10-10 | End: 2019-10-10

## 2019-10-10 RX ORDER — SODIUM CHLORIDE, SODIUM LACTATE, POTASSIUM CHLORIDE, CALCIUM CHLORIDE 600; 310; 30; 20 MG/100ML; MG/100ML; MG/100ML; MG/100ML
INJECTION, SOLUTION INTRAVENOUS CONTINUOUS
Status: DISCONTINUED | OUTPATIENT
Start: 2019-10-10 | End: 2019-10-11

## 2019-10-10 RX ORDER — IPRATROPIUM BROMIDE AND ALBUTEROL SULFATE 2.5; .5 MG/3ML; MG/3ML
3 SOLUTION RESPIRATORY (INHALATION)
Status: DISCONTINUED | OUTPATIENT
Start: 2019-10-10 | End: 2019-10-13

## 2019-10-10 RX ORDER — GUAIFENESIN 600 MG/1
600 TABLET, EXTENDED RELEASE ORAL EVERY 12 HOURS
Status: DISCONTINUED | OUTPATIENT
Start: 2019-10-10 | End: 2019-10-14 | Stop reason: HOSPADM

## 2019-10-10 RX ORDER — FUROSEMIDE 10 MG/ML
20 INJECTION INTRAMUSCULAR; INTRAVENOUS
Status: DISCONTINUED | OUTPATIENT
Start: 2019-10-10 | End: 2019-10-10

## 2019-10-10 RX ADMIN — IPRATROPIUM BROMIDE AND ALBUTEROL SULFATE 3 ML: .5; 3 SOLUTION RESPIRATORY (INHALATION) at 19:32

## 2019-10-10 RX ADMIN — PAROXETINE HYDROCHLORIDE 60 MG: 20 TABLET, FILM COATED ORAL at 18:30

## 2019-10-10 RX ADMIN — DEXTROSE MONOHYDRATE, SODIUM CHLORIDE, SODIUM LACTATE, POTASSIUM CHLORIDE, CALCIUM CHLORIDE: 5; 600; 310; 179; 20 INJECTION, SOLUTION INTRAVENOUS at 05:20

## 2019-10-10 RX ADMIN — OXYCODONE HYDROCHLORIDE 10 MG: 5 TABLET ORAL at 02:34

## 2019-10-10 RX ADMIN — QUETIAPINE FUMARATE 100 MG: 100 TABLET ORAL at 18:30

## 2019-10-10 RX ADMIN — OMEPRAZOLE 20 MG: 20 CAPSULE, DELAYED RELEASE ORAL at 18:30

## 2019-10-10 RX ADMIN — OXYCODONE HYDROCHLORIDE 10 MG: 5 TABLET ORAL at 21:18

## 2019-10-10 RX ADMIN — IOHEXOL 46 ML: 350 INJECTION, SOLUTION INTRAVENOUS at 22:51

## 2019-10-10 RX ADMIN — IPRATROPIUM BROMIDE AND ALBUTEROL SULFATE 3 ML: .5; 3 SOLUTION RESPIRATORY (INHALATION) at 22:08

## 2019-10-10 RX ADMIN — HEPARIN SODIUM 5000 UNITS: 5000 INJECTION, SOLUTION INTRAVENOUS; SUBCUTANEOUS at 21:57

## 2019-10-10 RX ADMIN — DIBASIC SODIUM PHOSPHATE, MONOBASIC POTASSIUM PHOSPHATE AND MONOBASIC SODIUM PHOSPHATE 2 TABLET: 852; 155; 130 TABLET ORAL at 18:33

## 2019-10-10 RX ADMIN — OMEPRAZOLE 20 MG: 20 CAPSULE, DELAYED RELEASE ORAL at 05:21

## 2019-10-10 RX ADMIN — OXYCODONE HYDROCHLORIDE 10 MG: 5 TABLET ORAL at 12:33

## 2019-10-10 RX ADMIN — POTASSIUM CHLORIDE 40 MEQ: 1500 TABLET, EXTENDED RELEASE ORAL at 18:30

## 2019-10-10 RX ADMIN — OXYCODONE HYDROCHLORIDE 10 MG: 5 TABLET ORAL at 08:33

## 2019-10-10 RX ADMIN — SODIUM CHLORIDE, POTASSIUM CHLORIDE, SODIUM LACTATE AND CALCIUM CHLORIDE: 600; 310; 30; 20 INJECTION, SOLUTION INTRAVENOUS at 23:08

## 2019-10-10 RX ADMIN — HEPARIN SODIUM 5000 UNITS: 5000 INJECTION, SOLUTION INTRAVENOUS; SUBCUTANEOUS at 05:22

## 2019-10-10 RX ADMIN — FUROSEMIDE 20 MG: 10 INJECTION, SOLUTION INTRAMUSCULAR; INTRAVENOUS at 11:46

## 2019-10-10 RX ADMIN — DIBASIC SODIUM PHOSPHATE, MONOBASIC POTASSIUM PHOSPHATE AND MONOBASIC SODIUM PHOSPHATE 2 TABLET: 852; 155; 130 TABLET ORAL at 05:21

## 2019-10-10 RX ADMIN — FUROSEMIDE 20 MG: 10 INJECTION, SOLUTION INTRAMUSCULAR; INTRAVENOUS at 16:37

## 2019-10-10 RX ADMIN — POTASSIUM CHLORIDE 40 MEQ: 1500 TABLET, EXTENDED RELEASE ORAL at 05:21

## 2019-10-10 RX ADMIN — OXYCODONE HYDROCHLORIDE 10 MG: 5 TABLET ORAL at 16:37

## 2019-10-10 ASSESSMENT — ENCOUNTER SYMPTOMS
WHEEZING: 0
BLURRED VISION: 0
SPUTUM PRODUCTION: 0
FLANK PAIN: 0
WEAKNESS: 0
HEMOPTYSIS: 0
SENSORY CHANGE: 0
BACK PAIN: 0
PALPITATIONS: 0
COUGH: 0
EYE DISCHARGE: 0
COUGH: 1
NAUSEA: 0
HEARTBURN: 0
BLOOD IN STOOL: 0
DIARRHEA: 0
SINUS PAIN: 0
FEVER: 0
STRIDOR: 0
ABDOMINAL PAIN: 1
PND: 0
NECK PAIN: 0
HEADACHES: 0
SPEECH CHANGE: 0
EYE REDNESS: 0
SORE THROAT: 0
NERVOUS/ANXIOUS: 0
FOCAL WEAKNESS: 0
SHORTNESS OF BREATH: 0
DOUBLE VISION: 0
BRUISES/BLEEDS EASILY: 0
DIAPHORESIS: 0
VOMITING: 0
SHORTNESS OF BREATH: 1
CHILLS: 0

## 2019-10-10 ASSESSMENT — COPD QUESTIONNAIRES
DURING THE PAST 4 WEEKS HOW MUCH DID YOU FEEL SHORT OF BREATH: NONE/LITTLE OF THE TIME
HAVE YOU SMOKED AT LEAST 100 CIGARETTES IN YOUR ENTIRE LIFE: YES
COPD SCREENING SCORE: 4
DO YOU EVER COUGH UP ANY MUCUS OR PHLEGM?: YES, A FEW DAYS A WEEK OR MONTH

## 2019-10-10 NOTE — PROGRESS NOTES
Pt laying in bed, call light within reach, bed lowered and locked, fall education reinforced. Pt is A&Ox4 and on 1L of oxygen via nasal cannula. Pt IV is clean,dry,intact, and infusing the appropriate fluids. Pt lung sounds are diminished in lower lobes, bowel sounds are normoactive in all four quadrants, heart sounds are within defined limits. Pt is up stand-by assist with a steady gait and is continent. Pt reports continuing pain in the abdomen with prn pain medication given. Pt has a midline incision approximated with staples and KATHI with no drainage. Pt has an old RLQ JG drain with gauze and tegaderm in place CDI. Pt started on dysphagia 3 mechanical soft diet and plan is for discharge tomorrow.   Dr. Mackay from the hospitalist team consulted regarding bowel medications since patient has not had a bowel movement this admission. Bowel protocol in place.

## 2019-10-10 NOTE — PROGRESS NOTES
[facility] Medicine Daily Progress Note    Date of Service  10/10/2019    Chief Complaint  49 y.o. female admitted 10/4/2019 with abdominal pain.     Hospital Course      Hx of  rheumatoid arthritis, GERD, osteoporosis, chronic pain, asthma/COPD, depression anxiety, tobacco dependence admitted with CT findings of abdominal abscess/free air and concern for duodenal perforation with acute kidney injury.  Patient had emergent exploratory laparotomy revealing duodenal perforation status post repair on 10/4/2019 and treated in ICU.    Interval Problem Update    Difficulty weaning O2.  Follow-up chest x-ray with right-sided effusion, cannot exclude infiltrate.  Reports at times cough with congestion.  Tolerating diet.    Consultants/Specialty    Dr Fonseca, surgery     Code Status  Full code.     Disposition  Plan continue IV atbs, PT/OT    Review of Systems  Review of Systems   Constitutional: Negative for chills, diaphoresis, fever and malaise/fatigue.   HENT: Positive for congestion.    Eyes: Negative for discharge and redness.   Respiratory: Positive for cough. Negative for shortness of breath, wheezing and stridor.    Cardiovascular: Negative for chest pain, palpitations and leg swelling.   Gastrointestinal: Positive for abdominal pain (Mild). Negative for diarrhea, nausea and vomiting.   Genitourinary: Negative for flank pain and hematuria.   Musculoskeletal: Positive for joint pain. Negative for back pain and neck pain.         chronic arthritic pains of hands, shoulders   Neurological: Negative for speech change, focal weakness and weakness.        Physical Exam  Temp:  [36.3 °C (97.4 °F)-37.6 °C (99.6 °F)] 37.6 °C (99.6 °F)  Pulse:  [53-89] 89  Resp:  [15-18] 18  BP: (103-116)/(63-79) 116/63  SpO2:  [93 %-98 %] 98 %    Physical Exam   Constitutional: She is oriented to person, place, and time. No distress.   HENT:   Head: Normocephalic and atraumatic.   Right Ear: External ear normal.   Left Ear: External ear  normal.   Nose: Nose normal.   Eyes: EOM are normal. Right eye exhibits no discharge. Left eye exhibits no discharge. No scleral icterus.   Neck: Neck supple.   Cardiovascular: Normal rate and regular rhythm.   No murmur heard.  Pulmonary/Chest: No stridor. She has no wheezes. She has rales.   Diminished breath sounds right lung base   Abdominal: Soft. She exhibits no distension. There is no tenderness.   Midline  surgical incision w staples present- no dehisc, redness , normal BS.   Musculoskeletal: She exhibits deformity (Right 2nd thru 5th finger amputations). She exhibits no edema or tenderness.   Neurological: She is alert and oriented to person, place, and time.   No gross focal weakness   Skin: Skin is warm and dry. She is not diaphoretic. No pallor.   Psychiatric: She has a normal mood and affect. Her behavior is normal.   Vitals reviewed.      Fluids    Intake/Output Summary (Last 24 hours) at 10/10/2019 1355  Last data filed at 10/10/2019 1200  Gross per 24 hour   Intake 1740 ml   Output 1250 ml   Net 490 ml       Laboratory  Recent Labs     10/08/19  0511 10/09/19  0411 10/10/19  0349   WBC 15.2* 13.9* 12.6*   RBC 3.42* 3.21* 3.19*   HEMOGLOBIN 11.4* 10.5* 10.5*   HEMATOCRIT 34.4* 32.0* 32.4*   .6* 99.7* 101.6*   MCH 33.3* 32.7 32.9   MCHC 33.1* 32.8* 32.4*   RDW 57.6* 57.1* 58.4*   PLATELETCT 319 316 286   MPV 10.1 10.0 10.3     Recent Labs     10/08/19  1602 10/09/19  0411 10/10/19  0349   SODIUM 137 139 138   POTASSIUM 3.1* 3.3* 3.9   CHLORIDE 103 107 106   CO2 24 25 25   GLUCOSE 134* 126* 121*   BUN <3* <3* 3*   CREATININE 0.55 0.57 0.55   CALCIUM 7.8* 7.8* 8.3*                   Imaging  DX-CHEST-PORTABLE (1 VIEW)   Final Result      Probable moderate RIGHT subpulmonic pleural effusion.  Superimposed pneumonia is not excluded.      CT-ABDOMEN-PELVIS WITH   Final Result   Addendum 1 of 1   Addendum: There is focus of air seen adjacent to the second portion of the    duodenum, the duodenal wall  appears slightly thickened. Consider duodenal    ulcer/perforation as etiology of these findings.      These findings were discussed with the patient's clinician, MARSHALL DEL VALLE,    on 10/4/2019 7:27 AM.      Final           Assessment/Plan  * Duodenal perforation (HCC)- (present on admission)  Assessment & Plan  Status post exploratory laparotomy and surgical repair 10/4-clinically doing well.  Decreased abdominal pain, tolerating liquids  Continue empiric antibiotics, ceftriaxone and Flagyl and de-escalate as clinically appropriate  Continue with Prilosec twice daily  GI soft dysphasia diet as tolerated  Pain control with PRN oxycodone  DC IV morphine  Hep-Lock IV fluids    Hypoxia  Assessment & Plan  Fu CXR with effusion, ? Right sided infiltrate. Patient afebrile. But has cough, congestion  Start IV lasix  Check pro calcitonin for signs of bacterial infxn  Follow clinically .  Encourage IS  Mobilize   o2 nc support-- wean as axel.    Hypophosphatemia  Assessment & Plan  Continue  Neutra-Phos  Monitor levels as will be risk for refeeding syndrome    Hypokalemia- (present on admission)  Assessment & Plan  Corrected   continue with oral potassium  Follow-up BMP    Hoarseness- (present on admission)  Assessment & Plan  Postprocedure, could be anesthesia/intubation related-clearing.  Continue with Dysphagia 3 diet , SLP evaluation  Stable respiratory symptoms, continue close clinical monitoring      Leukocytosis- (present on admission)  Assessment & Plan  Associate with duodenal perforation.  Blood cultures negative.  Afebrile.   Patient with cough, congestion with right-sided pleural effusion, ?  Infiltrate.  Follow clinically for signs of pneumonia, check procalcitonin level      Chronic pain  Assessment & Plan  Assoc with arthritis   Resume Neurontin.      Rheumatoid arthritis (HCC)- (present on admission)  Assessment & Plan  History of with chronic joint pain.  PRN oxycodone, Tylenol  Outpatient rheumatology  follow-up    Tobacco dependence- (present on admission)  Assessment & Plan  Patient has been counseled and educated regarding cessation  Continue to emphasize cessation       VTE prophylaxis: Heparin Sc.     Discussed with multi disciplinary team plan of care.

## 2019-10-10 NOTE — PROGRESS NOTES
AOx4  SPO2 87% on 3L O2 this morning, breathing shallow due to pain  SPO2 90-92% on 5L O2  Pain #8/10 in back and hips r/t RA-medicated per MAR  +BM/+flatus  Poor appetite, not eating breakfast  Voiding adequately  Ambulating frequently  Midline incision cdi with staples KATHI  SCD's off  Encouraged to use IS

## 2019-10-10 NOTE — ASSESSMENT & PLAN NOTE
Fu CXR with effusion, ? Right sided infiltrate. Patient afebrile. But has cough, congestion  Start IV lasix  Check pro calcitonin for signs of bacterial infxn  Follow clinically .  Encourage IS  Mobilize   o2 nc support-- wean as axel.

## 2019-10-11 ENCOUNTER — APPOINTMENT (OUTPATIENT)
Dept: RADIOLOGY | Facility: MEDICAL CENTER | Age: 49
DRG: 329 | End: 2019-10-11
Attending: INTERNAL MEDICINE
Payer: MEDICAID

## 2019-10-11 ENCOUNTER — APPOINTMENT (OUTPATIENT)
Dept: CARDIOLOGY | Facility: MEDICAL CENTER | Age: 49
DRG: 329 | End: 2019-10-11
Attending: INTERNAL MEDICINE
Payer: MEDICAID

## 2019-10-11 PROBLEM — R57.9 SHOCK (HCC): Status: ACTIVE | Noted: 2019-10-11

## 2019-10-11 PROBLEM — A41.9 SEPTIC SHOCK (HCC): Status: ACTIVE | Noted: 2019-10-11

## 2019-10-11 PROBLEM — R65.21 SEPTIC SHOCK (HCC): Status: ACTIVE | Noted: 2019-10-11

## 2019-10-11 LAB
ANION GAP SERPL CALC-SCNC: 12 MMOL/L (ref 0–11.9)
ANION GAP SERPL CALC-SCNC: 7 MMOL/L (ref 0–11.9)
BASOPHILS # BLD AUTO: 0.4 % (ref 0–1.8)
BASOPHILS # BLD AUTO: 0.9 % (ref 0–1.8)
BASOPHILS # BLD: 0.06 K/UL (ref 0–0.12)
BASOPHILS # BLD: 0.13 K/UL (ref 0–0.12)
BUN SERPL-MCNC: 5 MG/DL (ref 8–22)
BUN SERPL-MCNC: 6 MG/DL (ref 8–22)
CALCIUM SERPL-MCNC: 8 MG/DL (ref 8.5–10.5)
CALCIUM SERPL-MCNC: 8.2 MG/DL (ref 8.5–10.5)
CHLORIDE SERPL-SCNC: 102 MMOL/L (ref 96–112)
CHLORIDE SERPL-SCNC: 97 MMOL/L (ref 96–112)
CO2 SERPL-SCNC: 27 MMOL/L (ref 20–33)
CO2 SERPL-SCNC: 27 MMOL/L (ref 20–33)
COMMENT 1642: NORMAL
CREAT SERPL-MCNC: 0.62 MG/DL (ref 0.5–1.4)
CREAT SERPL-MCNC: 0.79 MG/DL (ref 0.5–1.4)
EKG IMPRESSION: NORMAL
EOSINOPHIL # BLD AUTO: 0.13 K/UL (ref 0–0.51)
EOSINOPHIL # BLD AUTO: 0.21 K/UL (ref 0–0.51)
EOSINOPHIL NFR BLD: 0.9 % (ref 0–6.9)
EOSINOPHIL NFR BLD: 1.5 % (ref 0–6.9)
ERYTHROCYTE [DISTWIDTH] IN BLOOD BY AUTOMATED COUNT: 57.5 FL (ref 35.9–50)
ERYTHROCYTE [DISTWIDTH] IN BLOOD BY AUTOMATED COUNT: 59 FL (ref 35.9–50)
GLUCOSE SERPL-MCNC: 101 MG/DL (ref 65–99)
GLUCOSE SERPL-MCNC: 93 MG/DL (ref 65–99)
HCT VFR BLD AUTO: 30.5 % (ref 37–47)
HCT VFR BLD AUTO: 34.1 % (ref 37–47)
HGB BLD-MCNC: 10.9 G/DL (ref 12–16)
HGB BLD-MCNC: 9.8 G/DL (ref 12–16)
IMM GRANULOCYTES # BLD AUTO: 0.16 K/UL (ref 0–0.11)
IMM GRANULOCYTES NFR BLD AUTO: 1.1 % (ref 0–0.9)
LV EJECT FRACT  99904: 65
LV EJECT FRACT MOD 2C 99903: 76.26
LV EJECT FRACT MOD 4C 99902: 67.79
LV EJECT FRACT MOD BP 99901: 69.15
LYMPHOCYTES # BLD AUTO: 2.32 K/UL (ref 1–4.8)
LYMPHOCYTES # BLD AUTO: 2.57 K/UL (ref 1–4.8)
LYMPHOCYTES NFR BLD: 15.6 % (ref 22–41)
LYMPHOCYTES NFR BLD: 18.2 % (ref 22–41)
MANUAL DIFF BLD: NORMAL
MCH RBC QN AUTO: 32.2 PG (ref 27–33)
MCH RBC QN AUTO: 32.8 PG (ref 27–33)
MCHC RBC AUTO-ENTMCNC: 32 G/DL (ref 33.6–35)
MCHC RBC AUTO-ENTMCNC: 32.1 G/DL (ref 33.6–35)
MCV RBC AUTO: 100.9 FL (ref 81.4–97.8)
MCV RBC AUTO: 102 FL (ref 81.4–97.8)
MONOCYTES # BLD AUTO: 0.91 K/UL (ref 0–0.85)
MONOCYTES # BLD AUTO: 1.53 K/UL (ref 0–0.85)
MONOCYTES NFR BLD AUTO: 10.9 % (ref 0–13.4)
MONOCYTES NFR BLD AUTO: 6.1 % (ref 0–13.4)
MORPHOLOGY BLD-IMP: NORMAL
MORPHOLOGY BLD-IMP: NORMAL
MYELOCYTES NFR BLD MANUAL: 0.9 %
NEUTROPHILS # BLD AUTO: 11.26 K/UL (ref 2–7.15)
NEUTROPHILS # BLD AUTO: 9.56 K/UL (ref 2–7.15)
NEUTROPHILS NFR BLD: 67.9 % (ref 44–72)
NEUTROPHILS NFR BLD: 75.6 % (ref 44–72)
NRBC # BLD AUTO: 0 K/UL
NRBC # BLD AUTO: 0 K/UL
NRBC BLD-RTO: 0 /100 WBC
NRBC BLD-RTO: 0 /100 WBC
PHOSPHATE SERPL-MCNC: 3.8 MG/DL (ref 2.5–4.5)
PLATELET # BLD AUTO: 310 K/UL (ref 164–446)
PLATELET # BLD AUTO: 337 K/UL (ref 164–446)
PLATELET BLD QL SMEAR: NORMAL
PLATELET BLD QL SMEAR: NORMAL
PMV BLD AUTO: 10.1 FL (ref 9–12.9)
PMV BLD AUTO: 10.2 FL (ref 9–12.9)
POTASSIUM SERPL-SCNC: 4.2 MMOL/L (ref 3.6–5.5)
POTASSIUM SERPL-SCNC: 4.2 MMOL/L (ref 3.6–5.5)
RBC # BLD AUTO: 2.99 M/UL (ref 4.2–5.4)
RBC # BLD AUTO: 3.38 M/UL (ref 4.2–5.4)
RBC BLD AUTO: NORMAL
RBC BLD AUTO: NORMAL
SODIUM SERPL-SCNC: 136 MMOL/L (ref 135–145)
SODIUM SERPL-SCNC: 136 MMOL/L (ref 135–145)
TROPONIN T SERPL-MCNC: 13 NG/L (ref 6–19)
WBC # BLD AUTO: 14.1 K/UL (ref 4.8–10.8)
WBC # BLD AUTO: 14.9 K/UL (ref 4.8–10.8)

## 2019-10-11 PROCEDURE — A9270 NON-COVERED ITEM OR SERVICE: HCPCS | Performed by: INTERNAL MEDICINE

## 2019-10-11 PROCEDURE — 700102 HCHG RX REV CODE 250 W/ 637 OVERRIDE(OP): Performed by: NURSE PRACTITIONER

## 2019-10-11 PROCEDURE — 36556 INSERT NON-TUNNEL CV CATH: CPT

## 2019-10-11 PROCEDURE — 700101 HCHG RX REV CODE 250: Performed by: INTERNAL MEDICINE

## 2019-10-11 PROCEDURE — 80048 BASIC METABOLIC PNL TOTAL CA: CPT

## 2019-10-11 PROCEDURE — 700102 HCHG RX REV CODE 250 W/ 637 OVERRIDE(OP): Performed by: INTERNAL MEDICINE

## 2019-10-11 PROCEDURE — 71045 X-RAY EXAM CHEST 1 VIEW: CPT

## 2019-10-11 PROCEDURE — 99406 BEHAV CHNG SMOKING 3-10 MIN: CPT

## 2019-10-11 PROCEDURE — 99291 CRITICAL CARE FIRST HOUR: CPT | Mod: 25 | Performed by: PSYCHIATRY & NEUROLOGY

## 2019-10-11 PROCEDURE — 93970 EXTREMITY STUDY: CPT

## 2019-10-11 PROCEDURE — 99291 CRITICAL CARE FIRST HOUR: CPT | Performed by: HOSPITALIST

## 2019-10-11 PROCEDURE — 99152 MOD SED SAME PHYS/QHP 5/>YRS: CPT

## 2019-10-11 PROCEDURE — 700111 HCHG RX REV CODE 636 W/ 250 OVERRIDE (IP): Performed by: INTERNAL MEDICINE

## 2019-10-11 PROCEDURE — A9270 NON-COVERED ITEM OR SERVICE: HCPCS | Performed by: HOSPITALIST

## 2019-10-11 PROCEDURE — 93306 TTE W/DOPPLER COMPLETE: CPT

## 2019-10-11 PROCEDURE — 700105 HCHG RX REV CODE 258: Performed by: INTERNAL MEDICINE

## 2019-10-11 PROCEDURE — 770022 HCHG ROOM/CARE - ICU (200)

## 2019-10-11 PROCEDURE — 93306 TTE W/DOPPLER COMPLETE: CPT | Mod: 26 | Performed by: INTERNAL MEDICINE

## 2019-10-11 PROCEDURE — 94668 MNPJ CHEST WALL SBSQ: CPT

## 2019-10-11 PROCEDURE — 84100 ASSAY OF PHOSPHORUS: CPT

## 2019-10-11 PROCEDURE — 36556 INSERT NON-TUNNEL CV CATH: CPT | Mod: RT | Performed by: INTERNAL MEDICINE

## 2019-10-11 PROCEDURE — 700102 HCHG RX REV CODE 250 W/ 637 OVERRIDE(OP): Performed by: HOSPITALIST

## 2019-10-11 PROCEDURE — 700117 HCHG RX CONTRAST REV CODE 255: Performed by: INTERNAL MEDICINE

## 2019-10-11 PROCEDURE — 93010 ELECTROCARDIOGRAM REPORT: CPT | Performed by: INTERNAL MEDICINE

## 2019-10-11 PROCEDURE — 94640 AIRWAY INHALATION TREATMENT: CPT

## 2019-10-11 PROCEDURE — 05HY33Z INSERTION OF INFUSION DEVICE INTO UPPER VEIN, PERCUTANEOUS APPROACH: ICD-10-PCS | Performed by: INTERNAL MEDICINE

## 2019-10-11 PROCEDURE — 94669 MECHANICAL CHEST WALL OSCILL: CPT

## 2019-10-11 PROCEDURE — 87040 BLOOD CULTURE FOR BACTERIA: CPT

## 2019-10-11 PROCEDURE — A9270 NON-COVERED ITEM OR SERVICE: HCPCS | Performed by: NURSE PRACTITIONER

## 2019-10-11 PROCEDURE — 85025 COMPLETE CBC W/AUTO DIFF WBC: CPT

## 2019-10-11 PROCEDURE — C1751 CATH, INF, PER/CENT/MIDLINE: HCPCS

## 2019-10-11 PROCEDURE — 94667 MNPJ CHEST WALL 1ST: CPT

## 2019-10-11 RX ORDER — SODIUM CHLORIDE, SODIUM LACTATE, POTASSIUM CHLORIDE, AND CALCIUM CHLORIDE .6; .31; .03; .02 G/100ML; G/100ML; G/100ML; G/100ML
1000 INJECTION, SOLUTION INTRAVENOUS ONCE
Status: COMPLETED | OUTPATIENT
Start: 2019-10-11 | End: 2019-10-11

## 2019-10-11 RX ORDER — MORPHINE SULFATE 4 MG/ML
2-4 INJECTION, SOLUTION INTRAMUSCULAR; INTRAVENOUS ONCE
Status: COMPLETED | OUTPATIENT
Start: 2019-10-11 | End: 2019-10-11

## 2019-10-11 RX ORDER — SODIUM CHLORIDE, SODIUM LACTATE, POTASSIUM CHLORIDE, CALCIUM CHLORIDE 600; 310; 30; 20 MG/100ML; MG/100ML; MG/100ML; MG/100ML
1000 INJECTION, SOLUTION INTRAVENOUS ONCE
Status: COMPLETED | OUTPATIENT
Start: 2019-10-11 | End: 2019-10-11

## 2019-10-11 RX ADMIN — MORPHINE SULFATE 2 MG: 4 INJECTION INTRAVENOUS at 02:31

## 2019-10-11 RX ADMIN — PAROXETINE HYDROCHLORIDE 60 MG: 20 TABLET, FILM COATED ORAL at 17:27

## 2019-10-11 RX ADMIN — GUAIFENESIN 600 MG: 600 TABLET, EXTENDED RELEASE ORAL at 17:27

## 2019-10-11 RX ADMIN — HUMAN ALBUMIN MICROSPHERES AND PERFLUTREN 3 ML: 10; .22 INJECTION, SOLUTION INTRAVENOUS at 12:15

## 2019-10-11 RX ADMIN — CALCIUM POLYCARBOPHIL 625 MG: 625 TABLET, FILM COATED ORAL at 17:26

## 2019-10-11 RX ADMIN — QUETIAPINE FUMARATE 100 MG: 100 TABLET ORAL at 17:26

## 2019-10-11 RX ADMIN — DIBASIC SODIUM PHOSPHATE, MONOBASIC POTASSIUM PHOSPHATE AND MONOBASIC SODIUM PHOSPHATE 2 TABLET: 852; 155; 130 TABLET ORAL at 05:24

## 2019-10-11 RX ADMIN — IPRATROPIUM BROMIDE AND ALBUTEROL SULFATE 3 ML: .5; 3 SOLUTION RESPIRATORY (INHALATION) at 18:15

## 2019-10-11 RX ADMIN — HEPARIN SODIUM 5000 UNITS: 5000 INJECTION, SOLUTION INTRAVENOUS; SUBCUTANEOUS at 05:23

## 2019-10-11 RX ADMIN — OXYCODONE HYDROCHLORIDE 10 MG: 5 TABLET ORAL at 05:25

## 2019-10-11 RX ADMIN — OXYCODONE HYDROCHLORIDE 10 MG: 5 TABLET ORAL at 13:24

## 2019-10-11 RX ADMIN — OXYCODONE HYDROCHLORIDE 10 MG: 5 TABLET ORAL at 17:26

## 2019-10-11 RX ADMIN — OMEPRAZOLE 20 MG: 20 CAPSULE, DELAYED RELEASE ORAL at 05:25

## 2019-10-11 RX ADMIN — CEFTRIAXONE SODIUM 1 G: 1 INJECTION, POWDER, FOR SOLUTION INTRAMUSCULAR; INTRAVENOUS at 03:02

## 2019-10-11 RX ADMIN — CALCIUM POLYCARBOPHIL 625 MG: 625 TABLET, FILM COATED ORAL at 11:54

## 2019-10-11 RX ADMIN — SODIUM CHLORIDE, POTASSIUM CHLORIDE, SODIUM LACTATE AND CALCIUM CHLORIDE: 600; 310; 30; 20 INJECTION, SOLUTION INTRAVENOUS at 03:13

## 2019-10-11 RX ADMIN — CALCIUM POLYCARBOPHIL 625 MG: 625 TABLET, FILM COATED ORAL at 08:15

## 2019-10-11 RX ADMIN — ACETAMINOPHEN 650 MG: 325 TABLET, FILM COATED ORAL at 11:54

## 2019-10-11 RX ADMIN — GUAIFENESIN 600 MG: 600 TABLET, EXTENDED RELEASE ORAL at 05:26

## 2019-10-11 RX ADMIN — OXYCODONE HYDROCHLORIDE 10 MG: 5 TABLET ORAL at 09:25

## 2019-10-11 RX ADMIN — OMEPRAZOLE 20 MG: 20 CAPSULE, DELAYED RELEASE ORAL at 17:27

## 2019-10-11 RX ADMIN — NOREPINEPHRINE BITARTRATE 3 MCG/MIN: 1 INJECTION INTRAVENOUS at 05:03

## 2019-10-11 RX ADMIN — HEPARIN SODIUM 5000 UNITS: 5000 INJECTION, SOLUTION INTRAVENOUS; SUBCUTANEOUS at 21:37

## 2019-10-11 RX ADMIN — MORPHINE SULFATE 2 MG: 4 INJECTION INTRAVENOUS at 02:43

## 2019-10-11 RX ADMIN — SODIUM CHLORIDE, POTASSIUM CHLORIDE, SODIUM LACTATE AND CALCIUM CHLORIDE 1000 ML: 600; 310; 30; 20 INJECTION, SOLUTION INTRAVENOUS at 02:30

## 2019-10-11 RX ADMIN — ONDANSETRON 4 MG: 2 INJECTION INTRAMUSCULAR; INTRAVENOUS at 17:52

## 2019-10-11 RX ADMIN — IPRATROPIUM BROMIDE AND ALBUTEROL SULFATE 3 ML: .5; 3 SOLUTION RESPIRATORY (INHALATION) at 11:10

## 2019-10-11 RX ADMIN — GUAIFENESIN 600 MG: 600 TABLET, EXTENDED RELEASE ORAL at 00:22

## 2019-10-11 RX ADMIN — IPRATROPIUM BROMIDE AND ALBUTEROL SULFATE 3 ML: .5; 3 SOLUTION RESPIRATORY (INHALATION) at 15:41

## 2019-10-11 RX ADMIN — SODIUM CHLORIDE, POTASSIUM CHLORIDE, SODIUM LACTATE AND CALCIUM CHLORIDE 1000 ML: 600; 310; 30; 20 INJECTION, SOLUTION INTRAVENOUS at 00:16

## 2019-10-11 RX ADMIN — METRONIDAZOLE 500 MG: 500 INJECTION, SOLUTION INTRAVENOUS at 03:13

## 2019-10-11 RX ADMIN — IPRATROPIUM BROMIDE AND ALBUTEROL SULFATE 3 ML: .5; 3 SOLUTION RESPIRATORY (INHALATION) at 03:18

## 2019-10-11 RX ADMIN — OXYCODONE HYDROCHLORIDE 10 MG: 5 TABLET ORAL at 21:37

## 2019-10-11 RX ADMIN — HEPARIN SODIUM 5000 UNITS: 5000 INJECTION, SOLUTION INTRAVENOUS; SUBCUTANEOUS at 13:24

## 2019-10-11 RX ADMIN — POTASSIUM CHLORIDE 40 MEQ: 1500 TABLET, EXTENDED RELEASE ORAL at 17:27

## 2019-10-11 RX ADMIN — POTASSIUM CHLORIDE 40 MEQ: 1500 TABLET, EXTENDED RELEASE ORAL at 05:23

## 2019-10-11 RX ADMIN — IPRATROPIUM BROMIDE AND ALBUTEROL SULFATE 3 ML: .5; 3 SOLUTION RESPIRATORY (INHALATION) at 22:31

## 2019-10-11 RX ADMIN — DIBASIC SODIUM PHOSPHATE, MONOBASIC POTASSIUM PHOSPHATE AND MONOBASIC SODIUM PHOSPHATE 2 TABLET: 852; 155; 130 TABLET ORAL at 17:27

## 2019-10-11 RX ADMIN — SODIUM CHLORIDE, POTASSIUM CHLORIDE, SODIUM LACTATE AND CALCIUM CHLORIDE: 600; 310; 30; 20 INJECTION, SOLUTION INTRAVENOUS at 01:09

## 2019-10-11 RX ADMIN — IPRATROPIUM BROMIDE AND ALBUTEROL SULFATE 3 ML: .5; 3 SOLUTION RESPIRATORY (INHALATION) at 07:24

## 2019-10-11 ASSESSMENT — ENCOUNTER SYMPTOMS
BACK PAIN: 0
LOSS OF CONSCIOUSNESS: 0
VOMITING: 0
FEVER: 0
ABDOMINAL PAIN: 1
DIARRHEA: 0
WEAKNESS: 1
SHORTNESS OF BREATH: 1
NAUSEA: 0
PALPITATIONS: 0
HEADACHES: 0
SORE THROAT: 0
DIZZINESS: 0
DOUBLE VISION: 0
BLURRED VISION: 0
CHILLS: 0
COUGH: 0

## 2019-10-11 NOTE — CONSULTS
Critical Care Consultation    Date of consult: 10/10/2019    Referring Physician  Pankaj Lam M.D.    Reason for Consultation  Hypoxemia, query pulmonary embolus    History of Presenting Illness  49 y.o. female with a significant past medical history of rheumatoid arthritis who presented 10/4/2019 with abdominal pain and found to have perforated duodenum and was taken to exploratory laparotomy with repair by Dr. Chaudhari on 10/4.  Patient clinically had been improving, abdominal drains pulled, patient had a mobilize and doing well.  Patient been on prophylaxis for DVT throughout.  Patient has no history of DVT or PE.  She denies any bleeding history on extensive review from head to toe.  There is no family history of DVT, PE or bleeding history.  She is never taken anticoagulation of any kind.  Today around 430 she developed fairly sudden onset of shortness of breath.  Oxygen requirements increased dramatically and she currently is on 100% FiO2 50 L flow by high flow nasal cannula system to get her sats into the 90s.  Blood pressures been a little soft with SBP 85-95.  Heart rate had been running in the 70s 80s and is now in the 90s.  She denies chest pain at this time and has had no hemoptysis.  Chest x-ray reveals minimal RLL-right infrahilar region atelectasis or opacity but actually is the same or slightly better from prior film.    Code Status  Full Code    Review of Systems  Review of Systems   Constitutional: Positive for malaise/fatigue. Negative for chills and fever.   HENT: Negative for congestion, nosebleeds, sinus pain and sore throat.    Eyes: Negative for blurred vision and double vision.   Respiratory: Positive for shortness of breath. Negative for cough, hemoptysis, sputum production and wheezing.    Cardiovascular: Positive for leg swelling. Negative for chest pain, palpitations and PND.   Gastrointestinal: Positive for abdominal pain (Proving daily, pain is from her exploratory laparotomy  incision). Negative for blood in stool, diarrhea, heartburn, melena, nausea and vomiting.   Genitourinary: Negative for dysuria, flank pain, frequency, hematuria and urgency.   Musculoskeletal: Negative for back pain.   Skin: Negative for rash.   Neurological: Negative for sensory change, speech change, focal weakness and headaches.   Endo/Heme/Allergies: Does not bruise/bleed easily.   Psychiatric/Behavioral: The patient is not nervous/anxious.         Past Medical History   has a past medical history of ASTHMA, Dental disorder, Pain, Psychiatric problem, and Rheumatoid arthritis(714.0) ().    Surgical History   has a past surgical history that includes gyn surgery; other (); nicholas by laparoscopy (2011); abdominal abscess drainage (2011); other abdominal surgery; tonsillectomy; wrist orif; appendectomy; cholecystectomy; pr  delivery only; toe fusion (Right, 2015); bone spur excision (2015); hammertoe correction (2015); foot reconstruction rheumatic (Left, 2015); arthrodesis (Right, 2016); pip arthrodesis (Right, 2016); bone graft (Right, 2016); irrigation & debridement ortho (Right, 2016); finger amputation (Right, 2016); finger arthroplasty (Right, 2017); finger arthroplasty (Right, 2017); finger amputation (2017); and pr exploratory of abdomen (N/A, 10/4/2019).    Family History  family history is not on file.    Social History   reports that she has been smoking cigarettes. She has a 30.00 pack-year smoking history. She has never used smokeless tobacco. She reports that she does not drink alcohol or use drugs.    Medications  Home Medications     Reviewed by Junie Paniagua R.N. (Registered Nurse) on 10/04/19 at 0932  Med List Status: Complete   Medication Last Dose Status   acetaminophen (TYLENOL) tablet 650 mg  Active   albuterol (VENTOLIN OR PROVENTIL) 108 (90 BASE) MCG/ACT AERS inhalation aerosol 10/3/2019 Active   alendronate  (FOSAMAX) 70 MG Tab 10/1/2019 Active   bisacodyl (DULCOLAX) suppository 10 mg  Active   cefoTEtan (CEFOTAN) injection  Active   cefTRIAXone (ROCEPHIN) 2 g in  mL IVPB  Active   Etanercept (ENBREL) 50 MG/ML Solution Prefilled Syringe 10/1/2019 Active   famotidine (PEPCID) injection 20 mg  Active   fentaNYL (SUBLIMAZE) injection  Active   gabapentin (NEURONTIN) 800 MG tablet 10/3/2019 Active   gabapentin (NEURONTIN) capsule 800 mg  Active   heparin injection 5,000 Units  Active   magnesium hydroxide (MILK OF MAGNESIA) suspension 30 mL  Active   meloxicam (MOBIC) 15 MG tablet 10/3/2019 Active   meloxicam (MOBIC) tablet 15 mg  Active   metroNIDAZOLE (FLAGYL) IVPB 500 mg  Active   midazolam (VERSED) 2 MG/2ML injection  Active   morphine (pf) 4 mg/ml injection 2 mg  Active   NS infusion  Active   ondansetron (ZOFRAN ODT) dispertab 4 mg  Active   ondansetron (ZOFRAN) syringe/vial injection 4 mg  Active   oxyCODONE-acetaminophen (PERCOCET) 5-325 MG per tablet 1 Tab  Active   oxyCODONE-acetaminophen (PERCOCET) 5-325 MG Tab 10/3/2019 Active   pantoprazole (PROTONIX) 40 MG Tablet Delayed Response 10/3/2019 Active   pantoprazole (PROTONIX) EC tablet 40 mg  Active   PARoxetine (PAXIL) 30 MG Tab 10/3/2019 Active   PARoxetine (PAXIL) tablet 60 mg  Active   polyethylene glycol/lytes (MIRALAX) PACKET 1 Packet  Active   prochlorperazine (COMPAZINE) injection 5-10 mg  Active   promethazine (PHENERGAN) suppository 12.5-25 mg  Active   promethazine (PHENERGAN) tablet 12.5-25 mg  Active   propofol (DIPRIVAN) injection  Active   QUEtiapine (SEROQUEL) 100 MG Tab 10/3/2019 Active   QUEtiapine (SEROQUEL) tablet 100 mg  Active   rocuronium (ZEMURON) injection  Active   senna-docusate (PERICOLACE or SENOKOT S) 8.6-50 MG per tablet 2 Tab  Active   succinylcholine (ANECTINE) injection  Active   sulfamethoxazole-trimethoprim (BACTRIM DS) 800-160 MG tablet 10/3/2019 Active              Current Facility-Administered Medications   Medication  Dose Route Frequency Provider Last Rate Last Dose   • Respiratory Care per Protocol   Nebulization Continuous RT Kavon Bangura M.D.       • ipratropium-albuterol (DUONEB) nebulizer solution  3 mL Nebulization Q4HRS (RT) Paul Bernard M.D.   3 mL at 10/10/19 2208   • lactated ringers infusion   Intravenous Continuous Paul Bernard M.D. 100 mL/hr at 10/10/19 2308     • guaiFENesin LA (MUCINEX) tablet 600 mg  600 mg Oral Q12HRS Paul Bernard M.D.       • potassium chloride SA (Kdur) tablet 40 mEq  40 mEq Oral BID Kavon Bangura M.D.   40 mEq at 10/10/19 1830   • phosphorus (K-PHOS-NEUTRAL, PHOSPHA 250 NEUTRAL) per tablet 2 Tab  2 Tab Oral BID Kavon Bangura M.D.   2 Tab at 10/10/19 1833   • omeprazole (PRILOSEC) capsule 20 mg  20 mg Oral Q12HRS Kavon Bangura M.D.   20 mg at 10/10/19 1830   • PARoxetine (PAXIL) tablet 60 mg  60 mg Oral Q EVENING Kavon Bangura M.D.   60 mg at 10/10/19 1830   • QUEtiapine (SEROQUEL) tablet 100 mg  100 mg Oral Q EVENING Kavon Bangura M.D.   100 mg at 10/10/19 1830   • calcium polycarbophil (FIBERCON) tablet 625 mg  625 mg Oral TID WITH MEALS Dai Guidry M.D.   Stopped at 10/09/19 2045    And   • docusate sodium (COLACE) capsule 100 mg  100 mg Oral QDAY PRN Dai Guidry M.D.   100 mg at 10/09/19 2106    And   • magnesium hydroxide (MILK OF MAGNESIA) suspension 30 mL  30 mL Oral QDAY PRN Dai Guidry M.D.       • oxyCODONE immediate-release (ROXICODONE) tablet 5-10 mg  5-10 mg Oral Q4HRS PRN Kristi Sullivan A.P.RDeeN.   10 mg at 10/10/19 2118   • heparin injection 5,000 Units  5,000 Units Subcutaneous Q8HRS Perfecto Bernardo M.D.   5,000 Units at 10/10/19 2157   • acetaminophen (TYLENOL) tablet 650 mg  650 mg Oral Q6HRS PRN Perfecto Bernardo M.D.   650 mg at 10/09/19 1732   • ondansetron (ZOFRAN) syringe/vial injection 4 mg  4 mg Intravenous Q4HRS PRN Perfecto Bernardo M.D.   4 mg at 10/08/19 2341   • ondansetron (ZOFRAN ODT) dispertab 4 mg  4 mg Oral Q4HRS PRN Perfecto JOE  FRANKLIN Bernardo           Allergies  Allergies   Allergen Reactions   • Penicillins Anaphylaxis and Hives     Tolerates cephalosporins; reports throat swelling with PCN   • Nitrous Oxide Vomiting   • Aripiprazole [Abilify] Nausea     Spasms, shaking       Vital Signs last 24 hours  Temp:  [36.3 °C (97.4 °F)-37.6 °C (99.6 °F)] 37.3 °C (99.1 °F)  Pulse:  [84-96] 94  Resp:  [16-23] 18  BP: ()/(54-68) 86/54  SpO2:  [83 %-98 %] 92 %    Physical Exam  Physical Exam   Constitutional: She is oriented to person, place, and time. She appears well-developed and well-nourished. No distress.   HENT:   Head: Normocephalic and atraumatic.   Eyes: Pupils are equal, round, and reactive to light. EOM are normal. No scleral icterus.   Neck: Neck supple. No JVD present. No thyromegaly present.   Cardiovascular: Regular rhythm and intact distal pulses. Exam reveals no gallop and no friction rub.   No murmur heard.  Pulmonary/Chest: Effort normal and breath sounds normal. No respiratory distress. She has no wheezes. She has no rales.   Abdominal: Soft. Bowel sounds are normal. She exhibits no distension and no mass. There is tenderness (Minimal/Incisional). There is no rebound and no guarding.   Musculoskeletal: She exhibits deformity (Right hand surgery well-healed). She exhibits no edema or tenderness.   Neurological: She is alert and oriented to person, place, and time. No cranial nerve deficit. She exhibits normal muscle tone. Coordination normal.   Skin: Skin is warm and dry. She is not diaphoretic. No erythema.       Fluids    Intake/Output Summary (Last 24 hours) at 10/10/2019 2322  Last data filed at 10/10/2019 2200  Gross per 24 hour   Intake 1300 ml   Output 735 ml   Net 565 ml       Laboratory  Recent Results (from the past 48 hour(s))   Basic Metabolic Panel    Collection Time: 10/09/19  4:11 AM   Result Value Ref Range    Sodium 139 135 - 145 mmol/L    Potassium 3.3 (L) 3.6 - 5.5 mmol/L    Chloride 107 96 - 112 mmol/L    Co2  25 20 - 33 mmol/L    Glucose 126 (H) 65 - 99 mg/dL    Bun <3 (L) 8 - 22 mg/dL    Creatinine 0.57 0.50 - 1.40 mg/dL    Calcium 7.8 (L) 8.5 - 10.5 mg/dL    Anion Gap 7.0 0.0 - 11.9   MAGNESIUM    Collection Time: 10/09/19  4:11 AM   Result Value Ref Range    Magnesium 2.1 1.5 - 2.5 mg/dL   PHOSPHORUS    Collection Time: 10/09/19  4:11 AM   Result Value Ref Range    Phosphorus 1.9 (L) 2.5 - 4.5 mg/dL   CBC WITH DIFFERENTIAL    Collection Time: 10/09/19  4:11 AM   Result Value Ref Range    WBC 13.9 (H) 4.8 - 10.8 K/uL    RBC 3.21 (L) 4.20 - 5.40 M/uL    Hemoglobin 10.5 (L) 12.0 - 16.0 g/dL    Hematocrit 32.0 (L) 37.0 - 47.0 %    MCV 99.7 (H) 81.4 - 97.8 fL    MCH 32.7 27.0 - 33.0 pg    MCHC 32.8 (L) 33.6 - 35.0 g/dL    RDW 57.1 (H) 35.9 - 50.0 fL    Platelet Count 316 164 - 446 K/uL    MPV 10.0 9.0 - 12.9 fL    Neutrophils-Polys 77.40 (H) 44.00 - 72.00 %    Lymphocytes 11.30 (L) 22.00 - 41.00 %    Monocytes 9.60 0.00 - 13.40 %    Eosinophils 1.70 0.00 - 6.90 %    Basophils 0.00 0.00 - 1.80 %    Nucleated RBC 0.00 /100 WBC    Neutrophils (Absolute) 10.76 (H) 2.00 - 7.15 K/uL    Lymphs (Absolute) 1.57 1.00 - 4.80 K/uL    Monos (Absolute) 1.33 (H) 0.00 - 0.85 K/uL    Eos (Absolute) 0.24 0.00 - 0.51 K/uL    Baso (Absolute) 0.00 0.00 - 0.12 K/uL    NRBC (Absolute) 0.00 K/uL   ESTIMATED GFR    Collection Time: 10/09/19  4:11 AM   Result Value Ref Range    GFR If African American >60 >60 mL/min/1.73 m 2    GFR If Non African American >60 >60 mL/min/1.73 m 2   DIFFERENTIAL MANUAL    Collection Time: 10/09/19  4:11 AM   Result Value Ref Range    Manual Diff Status PERFORMED    PERIPHERAL SMEAR REVIEW    Collection Time: 10/09/19  4:11 AM   Result Value Ref Range    Peripheral Smear Review see below    PLATELET ESTIMATE    Collection Time: 10/09/19  4:11 AM   Result Value Ref Range    Plt Estimation Normal    Basic Metabolic Panel    Collection Time: 10/10/19  3:49 AM   Result Value Ref Range    Sodium 138 135 - 145 mmol/L     Potassium 3.9 3.6 - 5.5 mmol/L    Chloride 106 96 - 112 mmol/L    Co2 25 20 - 33 mmol/L    Glucose 121 (H) 65 - 99 mg/dL    Bun 3 (L) 8 - 22 mg/dL    Creatinine 0.55 0.50 - 1.40 mg/dL    Calcium 8.3 (L) 8.5 - 10.5 mg/dL    Anion Gap 7.0 0.0 - 11.9   CBC WITH DIFFERENTIAL    Collection Time: 10/10/19  3:49 AM   Result Value Ref Range    WBC 12.6 (H) 4.8 - 10.8 K/uL    RBC 3.19 (L) 4.20 - 5.40 M/uL    Hemoglobin 10.5 (L) 12.0 - 16.0 g/dL    Hematocrit 32.4 (L) 37.0 - 47.0 %    .6 (H) 81.4 - 97.8 fL    MCH 32.9 27.0 - 33.0 pg    MCHC 32.4 (L) 33.6 - 35.0 g/dL    RDW 58.4 (H) 35.9 - 50.0 fL    Platelet Count 286 164 - 446 K/uL    MPV 10.3 9.0 - 12.9 fL    Neutrophils-Polys 83.50 (H) 44.00 - 72.00 %    Lymphocytes 6.90 (L) 22.00 - 41.00 %    Monocytes 6.10 0.00 - 13.40 %    Eosinophils 0.90 0.00 - 6.90 %    Basophils 1.70 0.00 - 1.80 %    Nucleated RBC 0.00 /100 WBC    Neutrophils (Absolute) 10.63 (H) 2.00 - 7.15 K/uL    Lymphs (Absolute) 0.87 (L) 1.00 - 4.80 K/uL    Monos (Absolute) 0.77 0.00 - 0.85 K/uL    Eos (Absolute) 0.11 0.00 - 0.51 K/uL    Baso (Absolute) 0.21 (H) 0.00 - 0.12 K/uL    NRBC (Absolute) 0.00 K/uL    Anisocytosis 1+    ESTIMATED GFR    Collection Time: 10/10/19  3:49 AM   Result Value Ref Range    GFR If African American >60 >60 mL/min/1.73 m 2    GFR If Non African American >60 >60 mL/min/1.73 m 2   DIFFERENTIAL MANUAL    Collection Time: 10/10/19  3:49 AM   Result Value Ref Range    Bands-Stabs 0.90 0.00 - 10.00 %    Manual Diff Status PERFORMED    PERIPHERAL SMEAR REVIEW    Collection Time: 10/10/19  3:49 AM   Result Value Ref Range    Peripheral Smear Review see below    PLATELET ESTIMATE    Collection Time: 10/10/19  3:49 AM   Result Value Ref Range    Plt Estimation Normal    MORPHOLOGY    Collection Time: 10/10/19  3:49 AM   Result Value Ref Range    RBC Morphology Present     Polychromia 1+    proBrain Natriuretic Peptide, NT    Collection Time: 10/10/19  3:39 PM   Result Value Ref  Range    NT-proBNP 132 (H) 0 - 125 pg/mL   PROCALCITONIN    Collection Time: 10/10/19  3:39 PM   Result Value Ref Range    Procalcitonin 0.19 <0.25 ng/mL       Imaging  CT-CTA CHEST PULMONARY ARTERY W/ RECONS   Final Result         1.  No pulmonary embolus appreciated.   2.  Mucous plugging of the right lower lobe bronchi with complete collapse and atelectasis in the right lower lobe. Consider bronchoscopy.   3.  Scattered hazy groundglass pulmonary opacities could represent atypical infiltrates or edema.   4.  Fluid in the retroperitoneum tracking adjacent to the right kidney, appearance suggests abscess. Given history of duodenal perforation, or residual leak cannot be excluded.   5.  Prominent bilateral hilar and mediastinal lymph nodes, workup and evaluation for causes of adenopathy as clinically appropriate.   6.  Right nephrolithiasis      DX-CHEST-PORTABLE (1 VIEW)   Final Result         1.  Hazy right infrahilar density suggests infiltrate.      DX-CHEST-PORTABLE (1 VIEW)   Final Result      Probable moderate RIGHT subpulmonic pleural effusion.  Superimposed pneumonia is not excluded.      CT-ABDOMEN-PELVIS WITH   Final Result   Addendum 1 of 1   Addendum: There is focus of air seen adjacent to the second portion of the    duodenum, the duodenal wall appears slightly thickened. Consider duodenal    ulcer/perforation as etiology of these findings.      These findings were discussed with the patient's clinician, MARSHALL DEL VALLE,    on 10/4/2019 7:27 AM.      Final      EC-ECHOCARDIOGRAM COMPLETE W/ CONT    (Results Pending)   US-EXTREMITY VENOUS LOWER BILAT    (Results Pending)   DX-CHEST-PORTABLE (1 VIEW)    (Results Pending)       Assessment/Plan  * Acute hypoxemic respiratory failure (HCC)  Assessment & Plan  Sudden onset now with very wide Aa gradient and high O2 requirements  Soft but acceptable blood pressure with increased heart rate  Chest x-ray with minimal right infrahilar/right lower lobe  opacity/atelectasis out of proportion to degree of hypoxemia  CTA chest, rule out pulmonary embolus  Consider EKOS?  If negative CTA coronary combination atelectasis/COPD and perhaps pulmonary hypertension either from RA or COPD/hypoxemia chronically  Echocardiogram with Doppler  EKG  Troponin T/BNP  NIVT lower extremities  Hold Lasix, start IV lactated Ringer's 100 cc/h  Early catheter for strict I's and O's in a critically ill patient    Duodenal perforation (HCC)- (present on admission)  Assessment & Plan  Status post ex lap with DU repair 10/4 by Dr. Chaudhari  No clinical evidence of bleeding and no history of bleeding diathesis or thrombosis for that matter    Abnormal chest x-ray  Assessment & Plan  Radiographically appears like atelectasis  Clinically nothing to suggest pneumonia  Add mucolytic's-Mucinex  DuoNeb 4 times daily  CPT after nebulized treatments 4 times daily  Serial chest x-ray  Since spirometry  Mobilize    Anemia- (present on admission)  Assessment & Plan  Multifactorial including recent surgery and RA  No active bleeding clinically  Serial CBC    Leukocytosis- (present on admission)  Assessment & Plan  Mild elevation white count  No significant fever, T-max 99.6 last 24 hours  Procalcitonin obtained earlier today at 0.19-normal    Chronic pain  Assessment & Plan  Secondary to room arthritis and multiple surgeries for complications of this condition  Analgesia as clinically appropriate, caution at this point with soft blood pressure    Hypophosphatemia  Assessment & Plan  Replete, goal greater than 2.5, ERP    Hypokalemia- (present on admission)  Assessment & Plan  Replete, goal 4.0, ERP    Rheumatoid arthritis (HCC)- (present on admission)  Assessment & Plan  History of significant RA  History of prior multiple remittive agent use including methotrexate and Enbrel  Appears burnout on exam at this time  Monitor    Tobacco dependence- (present on admission)  Assessment & Plan  Smoking cessation  will be encouraged when clinically appropriate  Query component of COPD?  Not actively bronchospastic at this time and does not have any symptoms suggesting bronchitis or pneumonia      Discussed patient condition and risk of morbidity and/or mortality with Hospitalist, RN, RT and Patient.      The patient remains critically ill.  Critical care time = 45 minutes in directly providing and coordinating critical care and extensive data review.  No time overlap and excludes procedures.

## 2019-10-11 NOTE — CARE PLAN
Problem: Pain Management  Goal: Pain level will decrease to patient's comfort goal  Intervention: Follow pain managment plan developed in collaboration with patient and Interdisciplinary Team  10/11/2019 0329 by Rita Corrales R.N.  Note:   Patient assessed for pain using scale appropriate for mental status (0-10) and medicated per MAR with attention to hemodynamic status (hypotensive since ICU admit). Pt educated regarding importance of acute and chronic pain management, as well as nonpharmacologic measures available to enhance comfort.   10/10/2019 2150 by Rita Corrales R.N.  Note:   Patient assessed for pain using scale appropriate for mental status (0-10) and medicated per MAR/nonpharmacologic measures employed.     Problem: Venous Thromboembolism (VTW)/Deep Vein Thrombosis (DVT) Prevention:  Goal: Patient will participate in Venous Thrombosis (VTE)/Deep Vein Thrombosis (DVT)Prevention Measures  Intervention: Ensure patient wears graduated elastic stockings (MIKE hose) and/or SCDs, if ordered, when in bed or chair (Remove at least once per shift for skin check)  Note:   SCD's in use, in addition to encouragement of bed mobility pending improvement in pulmonary status (deferring OOB mobility due to highly increased O2 demands and thus HLOC to ICU). Pulmonary embolism workup performed as directed by intensivist team.

## 2019-10-11 NOTE — PROGRESS NOTES
NOC CNA came to pt's room to do vital signs at shift change. SPO2 was 83% on 3L O2. Increased to 6L via nasal cannula and only improved to 86%. Pt was placed on 10L oxymask without any further improvement, 87% at best. Pt placed on non-rebreather at 15L and was 84-88%. Respiratory called to come do an eval and breathing treatment ASAP. BP 80/50 in right arm, asymptomatic. No respiratory distress noted. Rhonchi noted throughout all fields. Paged on-call hospitalist to notify of low SPO2 and increased oxygen demand. Stated to get RT here stat and to notify immediately after. Pt's SPO@ 88-91% on 15L nonrebreather post treatment with RT. BP on right arm 60/55, BP on left arm 89/5. Pt still asymptomatic. Handed off to NOC nurseRocio who paged hospitalist again to update.

## 2019-10-11 NOTE — ASSESSMENT & PLAN NOTE
Smoking cessation will be encouraged when clinically appropriate  Query component of COPD?  Few wheezes on exam today continue NEBS  Likely needs PFTs at some point

## 2019-10-11 NOTE — ASSESSMENT & PLAN NOTE
WBC stable at 14  No significant fever, T-max 99.6 last 24 hours  Procalcitonin obtained earlier today at 0.19-normal  Off pressors

## 2019-10-11 NOTE — PROGRESS NOTES
Critical care progress note:  This is a 49-year-old female who presented with abdominal pain and found to have a perforated duodenum.  She had an exploratory laparotomy by Dr. Chaudhari on 4 October.  She had increased oxygen requirements today and is now requiring 50 L of high flow nasal cannula at 80% FiO2, private previously this was 100% today.  Her blood pressures have been 80 to 90s.  Chest x-ray had some right lower lobe changes.  She had a CT this evening for suspected PE of which was negative from pulmonary embolism but she has significant right lower lobe atelectasis and likely pneumonia.  I gave her 1 L of IV fluids bolused, followed by another 1 L IVF.  She continues to have low blood pressure.  She had received Lasix this morning.  Bedside ultrasound can tolerate additional liter.  Central line placed. Levophed for map goal 65 and sbp goal 90.      Reviewed imaging, notes, labs and orders    Assessment and Plan  Acute respiratory failure with hypoxia  Perforated duodenum  Pneumonia right lower lobe  Atelectasis  Hypotension  Septic shock  Rheumatoid arthritis  Tobacco use  Electrolyte disorder  Acute on chronic pain    Patient is critically ill. The patient continues to have septic shock and hypotension secondary to retroperitoneal abscess/infection and pneumonia.  Pressors for MAP > 65 and given 2 L IVF.  On hgh flow nasal cannula 50 L and 80% fio2 with sats 88-92%.  If untreated there is a high chance of deterioration and eventually death. The critical that has been undertaken is medically complex. There has been no overlap in critical care time. Critical Care Time not including procedures: 35 minutes

## 2019-10-11 NOTE — THERAPY
"Speech Language Therapy dysphagia treatment completed.     Functional Status: Patient was seen on this date for dysphagia reassessment s/p tx to higher level of care for increased O2 needs. Pt now on 40 L and 40% FiO2 via HFNC. Slight increase in WOB with repositioning to upright position. Otherwise, vitals stable and no respiratory distress. Pt seen with ~10% of D3 (chopped) diet and PO of NTL during breakfast which resulted in no overt s/sx of aspiration. Mild increase in WOB as meal progressed. Otherwise, no changes in O2 sats and RR remained stable. Vocal quality and breath sounds clear all session. Patient was slightly impulsive taking serial sips of thin liquids from a cup which resulted in WOB. However, WOB significantly reduced when taking single sips from the cup. Extensive education provided to pt reagrding current status, current risk of aspiration with HFNC, and SLP recs.     Recommendations: At this time, recommend downgrade to a D3 (chopped)/NTL diet given close monitoring. NO straws. OK for pt to have SINGLE sips of un-thickened water between meals with RN only. STOP with any concerns for aspiration and page SLP.     Plan of Care: Will benefit from Speech Therapy 3 times per week    Post-Acute Therapy: Recommend inpatient transitional care services for continued speech therapy services.      See \"Rehab Therapy-Acute\" Patient Summary Report for complete documentation.     "

## 2019-10-11 NOTE — PROGRESS NOTES
2223: Placed call to CT to inform of impending transport down in 5-10 mins. Consent for IV contrast signed.     2230: Dr. Bernard at bedside discussing POC with patient pending CTA results. Updated MD regarding minimal u/o since catheter placement at 2200, MD to order IVMF.     2236: Pt to CT accompanied by ACLS RN ad CCT on transport monitor and 15L NRB.    2255: Pt back to S114. VSS in imagining and en route with minor O2 desaturations upon lying flat. Pt placed back on HFNC 50L/100%, updated RT. Pt resting comfortably.

## 2019-10-11 NOTE — PROGRESS NOTES
2020: pt arrived to S114 accompanied by RRT RN x2 on transport monitor and 15L NRB. Moderate WOB and tachypnea noted with any sort of exertion/upon transferring to ICU bed, though pt conversational, AOx4. VS:    HR: 85-95 bpm (SR)  BP: 84/69 (74)  RR: 19-28  SpO2: 83-91% (!%L NRB)  Temp: 99F (temporal)  Weight: 51.7kg (bed scale)    2-RN skin check performed with Arpita PINTO RN. Noted:  -Generalized bruising to BUE.  -ML abdominal incision, stapled, approximated. Minimal dried blood around lower staples.   -Great toes and heels calloused and flaky.   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     No pressure concerns. Mepilex placed to sacrum preventatively. Medical devices in place (nerw, placed upon ICU admission) include EKG lead electrode stickers and wires, BP cuff (LUE), SpO2 finger probe, SCD's bilaterally, PIV x2 to RUE.    2030: RT Sanjay at bedside to apply HFNC (50L/100%). Pt saturating 86%-91%.

## 2019-10-11 NOTE — CARE PLAN
Problem: Pain Management  Goal: Pain level will decrease to patient's comfort goal  Outcome: PROGRESSING AS EXPECTED  Intervention: Follow pain managment plan developed in collaboration with patient and Interdisciplinary Team  Note:   10 mg oxy given per MAR      Problem: Communication  Goal: The ability to communicate needs accurately and effectively will improve  Outcome: MET  Intervention: [place] patient and significant other/support system to call light to alert staff of needs  Note:   Call light in pt hand, pt educated on use

## 2019-10-11 NOTE — ASSESSMENT & PLAN NOTE
Hypovolemic vs cardiogenic vs septic vs distributive (PE) vs RODRÍGUEZ  Doubt septic: have DC'd Abx's cont to follow closely  Has been volume resuscitated  CTA neg for PE  Checking Echo  Possibly component of vasoplegia  Cont to titrate David, decrease MAP goal to 60  Good UOP  Pt is Asx'c

## 2019-10-11 NOTE — PROCEDURES
Procedures  Procedure: right internal jugular central line insertion, us guided    : Dr Bautista    Reason: Septic shock with pneumonia and retroperitoneal abscess, vasopressors    The patient's right neck region was prepped and draped in sterile fashion using chlorhexidine scrub. Anesthesia was achieved with 1% lidocaine. The right internal jugular vein was accessed under ultrasound guidance using a finder needle Venous blood was withdrawn and the needle was withdrawn after a guidewire was advanced through the needle catheter. A small incision was made with a blade scalpel and the needle was exchanged for a dilator over the guidewire until appropriate dilation was obtained. The dilator was removed and a central venous triple-lumen catheter was advanced over the guidewire and secured into place with 2 sutures at 17 cm. At time of procedure completion, all ports aspirated and flushed properly. Post-procedure x-ray adequate placement and no pneumothorax.  Sterile procedure done throughout entire procedure by all participating.    Complications: none    Blood loss: < 3 cc

## 2019-10-11 NOTE — PROGRESS NOTES
Dr. Bautista at bedside to place central line. Orders for Levophed placed per MD to use as needed.

## 2019-10-11 NOTE — ASSESSMENT & PLAN NOTE
Radiographically appears like atelectasis  Add mucolytic's-Mucinex  DuoNeb 4 times daily  CPT after nebulized treatments 4 times daily  Serial chest x-ray  Since spirometry  Mobilize

## 2019-10-11 NOTE — PROGRESS NOTES
Dr. Bautista updated via telephone on urine output of 50 mL at 0200, as well as worsened hypotension in 70's-80's systolic. MD to come bedside shortly. New orders received for another 1L bolus LR. See MAR for additional ABX orders.

## 2019-10-11 NOTE — ASSESSMENT & PLAN NOTE
This is Sepsis Not present on admission  SIRS criteria identified on my evaluation include: Tachycardia, with heart rate greater than 90 BPM and Leukopenia, with WBC less than 4,000. Hypotensive  Source is respiratory  Sepsis protocol initiated  Fluid resuscitation ordered per protocol  IV antibiotics as appropriate for source of sepsis  While organ dysfunction may be noted elsewhere in this problem list or in the chart, degree of organ dysfunction does not meet CMS criteria for severe sepsis    Improved  Off pressors

## 2019-10-11 NOTE — PROGRESS NOTES
Discussed POC with Dr. Bernard at bedside, as CTA results have come back. Plan for EKG, trops x1 along with CMP/CBC. If troponin out of normal range, draw Q6 x2 more after initial draw. Updated MD on marginal urine output refractory to start of IVF, continue to monitor and alert oncoming MD Lily if persistently low and accompanied by hypotension. Patient fatigued, resting comfortably in bed. Ordered labs drawn by phlebotomist at 2340 following unsuccessful RN attempt.

## 2019-10-11 NOTE — ASSESSMENT & PLAN NOTE
Status post ex lap with DU repair 10/4 by Dr. Chaudhari  No clinical evidence of bleeding and no history of bleeding diathesis or thrombosis for that matter

## 2019-10-11 NOTE — PROGRESS NOTES
2051: call placed to [facility] LEANNE Chan to obtain report. RN satted she would call back shortly.   2058: Report received via telephone. No further questions.   2103: Page to intensivist Dr. Bernard to inform of pt arrival to unit.   2125: Second page to Dr. Bernard.  2130: Page returned. New orders for CXR. Call placed to X-ray. To place Sullivan catheter for accurate urine output measurement in the presence of hypotension. Orders for RT treatments upgraded to Q4h. MD reviewing chart and to place any other orders as needed.

## 2019-10-11 NOTE — ASSESSMENT & PLAN NOTE
Mucous plug noted on CT chest: neg for PE  Improving daily  Observing off Abx's  Cont to follow cultures and CBC  O2 demand decreasing  Cont O2/Resp protocols  Mobilize  hydrate

## 2019-10-11 NOTE — ASSESSMENT & PLAN NOTE
Likely secodnary mucus plug, PNA, atelectasis  CXs negative  Active titration of high flow nasal cannula  Pulmonary toilet with RT  If trajectory not increasing by early next week, may need to consider diagnostic bronch    Given her RA careful manipulation if needs intubation due potentially unstable cervical spine

## 2019-10-11 NOTE — ASSESSMENT & PLAN NOTE
History of significant RA  History of prior multiple remittive agent use including methotrexate and Enbrel  Appears burnout on exam at this time  Monitor

## 2019-10-11 NOTE — CARE PLAN
Respiratory Therapy Update    Interdisciplinary Plan of Care-Goals (Indications)  Bronchodilator Indications: Strong Subjective / Objective Improvement (10/11/19 0317)  Hyperinflation Protocol Indications: Atelectasis Documented by Chest X-Ray (10/11/19 0317)  Interdisciplinary Plan of Care-Outcomes   Bronchodilator Outcome: Improved Vital Signs and Measures of Gas Exchange (10/11/19 0317)  Hyperinflation Protocol Goals/Outcome: Stable Vital Capacity x24 hrs and Patient Understands / uses I.S. (10/11/19 0317)     #SVN Performed: Yes (10/11/19 0317)    Cough: Non Productive (10/11/19 0317)    FiO2%: 60 % (10/11/19 0317)  O2 (LPM): 50 (10/11/19 0317)  O2 Daily Delivery Respiratory : Highflow Nasal Cannula (10/11/19 0317)    Breath Sounds  Pre/Post Intervention: Pre Intervention Assessment (10/11/19 0317)  RUL Breath Sounds: Crackles (10/11/19 0317)  RML Breath Sounds: Crackles (10/11/19 0317)  RLL Breath Sounds: Diminished (10/11/19 0317)  NIXON Breath Sounds: Crackles (10/11/19 0317)  LLL Breath Sounds: Diminished (10/11/19 0317)    Events/Summary/Plan: Pt set up [facility] (10/10/19 2035)

## 2019-10-11 NOTE — PROGRESS NOTES
Notified  of walking O2 results and requested orders for home O2. Also requested respiratory treatments for pt as she is coughing up thick secretions.     1640-Placed SPO2 probe to ear lobe. Pt was 96% on 4L O2 at rest, will continue to attempt to wean.

## 2019-10-11 NOTE — PROGRESS NOTES
Placed call to pt's mother, Dafne, per pt's request to update on events of evening. No answer, left message with number to SICU pod 3.

## 2019-10-11 NOTE — PROGRESS NOTES
Dr. Bautista at bedside. Updated on patient status and hypotension/marginal urine output at midnight. orders for 1L LR. MD viewing CTA at bedside and relayed results to pt. Discussed POC with patient, patient in agreement, expressing understanding.

## 2019-10-11 NOTE — PROGRESS NOTES
Critical Care Progress Note    Date of admission  10/4/2019    Chief Complaint  49 y.o. female admitted 10/4/2019 with hypoxemia    Hospital Course    49 y.o. female with a significant past medical history of rheumatoid arthritis who presented 10/4/2019 with abdominal pain and found to have perforated duodenum and was taken to exploratory laparotomy with repair by Dr. Chaudhari on 10/4.  Patient clinically had been improving, abdominal drains pulled, patient had a mobilize and doing well.  Patient been on prophylaxis for DVT throughout.  Patient has no history of DVT or PE.  She denies any bleeding history on extensive review from head to toe.  There is no family history of DVT, PE or bleeding history.  She is never taken anticoagulation of any kind.  Today around 430 she developed fairly sudden onset of shortness of breath.  Oxygen requirements increased dramatically and she currently is on 100% FiO2 50 L flow by high flow nasal cannula system to get her sats into the 90s.  Blood pressures been a little soft with SBP 85-95.  Heart rate had been running in the 70s 80s and is now in the 90s.  She denies chest pain at this time and has had no hemoptysis.  Chest x-ray reveals minimal RLL-right infrahilar region atelectasis or opacity but actually is the same or slightly better from prior film      Interval Problem Update  Reviewed last 24 hour events:  - WBC 14.->14.1   - Neuro:    - HR: 70-90s   - SBP: 80-100s (norepi @3)   - GI: o   - UOP: 1.6L   - Sullivan: yes   - Tm: 37.6   - Lines: R IJ   - PPx: GI omeprazole, DVT Heparin   - HFNC 40/40   - CT Chest Mucus plugging but no PE   - ABX: ceftriaxone, metronidazole    Review of Systems  ROS     Vital Signs for last 24 hours   Temp:  [37.2 °C (99 °F)-37.6 °C (99.6 °F)] 37.2 °C (99 °F)  Pulse:  [] 73  Resp:  [12-72] 12  BP: ()/(50-65) 108/65  SpO2:  [83 %-99 %] 98 %    Hemodynamic parameters for last 24 hours  CVP:  [2 MM HG-3 MM HG] 3 MM HG    Respiratory  Lauri,     Let's have you take a course of Zithromax given the elevation in type of white cells that can be seen with bacterial infections. If the blood continues to persist I recommend seeing Dr. Delaney.   Our radiologist will review your chest xray.     I recommend seeing Joann Pabon, a chiropractor, at the [facility], [phone] , about your back pain.     Try Zolpidem (Ambien) at bedtime to help you sleep.      Information for the last 24 hours       Physical Exam   Physical Exam   Constitutional: She is oriented to person, place, and time. She appears well-developed and well-nourished. She appears distressed.   HENT:   Head: Normocephalic and atraumatic.   Mouth/Throat: Oropharynx is clear and moist.   Eyes: Pupils are equal, round, and reactive to light. Conjunctivae are normal.   Neck: No JVD present.   Cardiovascular: Normal rate, regular rhythm and intact distal pulses.   Pulmonary/Chest: She has wheezes. She has rales.   Abdominal: Soft. There is tenderness.   Musculoskeletal: She exhibits no edema.   Neurological: She is alert and oriented to person, place, and time. No cranial nerve deficit.   Skin: Skin is warm and dry. She is not diaphoretic.   Psychiatric: She has a normal mood and affect. Her behavior is normal.       Medications  Current Facility-Administered Medications   Medication Dose Route Frequency Provider Last Rate Last Dose   • cefTRIAXone (ROCEPHIN) 1 g in  mL IVPB  1 g Intravenous Q24HRS Rishabh Bautista M.D.   Stopped at 10/11/19 0332   • metroNIDAZOLE (FLAGYL) IVPB 500 mg  500 mg Intravenous Q8HRS Rishabh Bautista M.D.   Stopped at 10/11/19 0413   • norepinephrine (LEVOPHED) 8 mg in  mL Infusion  0-30 mcg/min Intravenous Continuous Rishabh Bautista M.D. 5.6 mL/hr at 10/11/19 0503 3 mcg/min at 10/11/19 0503   • Respiratory Care per Protocol   Nebulization Continuous RT Kavon Bangura M.D.       • ipratropium-albuterol (DUONEB) nebulizer solution  3 mL Nebulization Q4HRS (RT) Paul Bernard M.D.   3 mL at 10/11/19 0318   • lactated ringers infusion   Intravenous Continuous Paul Bernard M.D. 100 mL/hr at 10/11/19 0313     • guaiFENesin LA (MUCINEX) tablet 600 mg  600 mg Oral Q12HRS Paul Bernard M.D.   600 mg at 10/11/19 0526   • potassium chloride SA (Kdur) tablet 40 mEq  40 mEq Oral BID Kavon Bangura M.D.   40 mEq at 10/11/19 0523   • phosphorus (K-PHOS-NEUTRAL, PHOSPHA 250  NEUTRAL) per tablet 2 Tab  2 Tab Oral BID Kavon Bangura M.D.   2 Tab at 10/11/19 0524   • omeprazole (PRILOSEC) capsule 20 mg  20 mg Oral Q12HRS Kavon Bangura M.D.   20 mg at 10/11/19 0525   • PARoxetine (PAXIL) tablet 60 mg  60 mg Oral Q EVENING Kavon Bangura M.D.   60 mg at 10/10/19 1830   • QUEtiapine (SEROQUEL) tablet 100 mg  100 mg Oral Q EVENING Kavon Bangura M.D.   100 mg at 10/10/19 1830   • calcium polycarbophil (FIBERCON) tablet 625 mg  625 mg Oral TID WITH MEALS Dai Guidry M.D.   Stopped at 10/09/19 2045    And   • docusate sodium (COLACE) capsule 100 mg  100 mg Oral QDAY PRN Dai Guidry M.D.   100 mg at 10/09/19 2106    And   • magnesium hydroxide (MILK OF MAGNESIA) suspension 30 mL  30 mL Oral QDAY PRN Dai Guidry M.D.       • oxyCODONE immediate-release (ROXICODONE) tablet 5-10 mg  5-10 mg Oral Q4HRS PRN Kristi Sullivan A.P.RDeeNDee   10 mg at 10/11/19 0525   • heparin injection 5,000 Units  5,000 Units Subcutaneous Q8HRS Perfecto Bernardo M.D.   5,000 Units at 10/11/19 0523   • acetaminophen (TYLENOL) tablet 650 mg  650 mg Oral Q6HRS PRN Perfecto Beranrdo M.D.   650 mg at 10/09/19 1732   • ondansetron (ZOFRAN) syringe/vial injection 4 mg  4 mg Intravenous Q4HRS PRN Perfecto Bernardo M.D.   4 mg at 10/08/19 2341   • ondansetron (ZOFRAN ODT) dispertab 4 mg  4 mg Oral Q4HRS PRN Perfecto Bernardo M.D.           Fluids    Intake/Output Summary (Last 24 hours) at 10/11/2019 0637  Last data filed at 10/11/2019 0600  Gross per 24 hour   Intake 3667.94 ml   Output 1600 ml   Net 2067.94 ml       Laboratory          Recent Labs     10/09/19  0411 10/10/19  0349 10/10/19  2338 10/11/19  0535   SODIUM 139 138 136 136   POTASSIUM 3.3* 3.9 4.2 4.2   CHLORIDE 107 106 97 102   CO2 25 25 27 27   BUN <3* 3* 6* 5*   CREATININE 0.57 0.55 0.79 0.62   MAGNESIUM 2.1  --   --   --    PHOSPHORUS 1.9*  --   --  3.8   CALCIUM 7.8* 8.3* 8.2* 8.0*     Recent Labs     10/10/19  0349 10/10/19  2338 10/11/19  0535   GLUCOSE  121* 93 101*     Recent Labs     10/09/19  0411 10/10/19  0349 10/10/19  2338 10/11/19  0535   WBC 13.9* 12.6* 14.9* 14.1*   NEUTSPOLYS 77.40* 83.50* 75.60*  --    LYMPHOCYTES 11.30* 6.90* 15.60*  --    MONOCYTES 9.60 6.10 6.10  --    EOSINOPHILS 1.70 0.90 0.90  --    BASOPHILS 0.00 1.70 0.90  --      Recent Labs     10/10/19  0349 10/10/19  2338 10/11/19  0535   RBC 3.19* 3.38* 2.99*   HEMOGLOBIN 10.5* 10.9* 9.8*   HEMATOCRIT 32.4* 34.1* 30.5*   PLATELETCT 286 337 310       Imaging  X-Ray:  I have personally reviewed the images and compared with prior images.    Assessment/Plan  * Acute hypoxemic respiratory failure (HCC)  Assessment & Plan  Likely secodnary mucus plug, PNA, atelectasis  CXs pending  CTA chest, ruled out pulmonary embolus but suggestive of PN with mucus plugging  Active titration of high flow nasal cannula  Normal RV and LV function on echo  Early catheter for strict I's and O's in a critically ill patient    Given her RA careful manipulation if needs intubation due potentially unstable cervical spine    Duodenal perforation (HCC)- (present on admission)  Assessment & Plan  Status post ex lap with DU repair 10/4 by Dr. Chaudhari  No clinical evidence of bleeding and no history of bleeding diathesis or thrombosis for that matter    Abnormal chest x-ray  Assessment & Plan  Radiographically appears like atelectasis  Add mucolytic's-Mucinex  DuoNeb 4 times daily  CPT after nebulized treatments 4 times daily  Serial chest x-ray  Since spirometry  Mobilize    Anemia- (present on admission)  Assessment & Plan  Multifactorial including recent surgery and RA  No active bleeding clinically  Serial CBC    Leukocytosis- (present on admission)  Assessment & Plan  Mild elevation white count  No significant fever, T-max 99.6 last 24 hours  Procalcitonin obtained earlier today at 0.19-normal  Requires low dose pressors for MAP goal at present    Septic shock (HCC)  Assessment & Plan  This is Sepsis Not present on  admission  SIRS criteria identified on my evaluation include: Tachycardia, with heart rate greater than 90 BPM and Leukopenia, with WBC less than 4,000. Hypotensive  Source is respiratory  Sepsis protocol initiated  Fluid resuscitation ordered per protocol  IV antibiotics as appropriate for source of sepsis  While organ dysfunction may be noted elsewhere in this problem list or in the chart, degree of organ dysfunction does not meet CMS criteria for severe sepsis    Improving  Active titration of low dose pressors for MAP goals        Chronic pain  Assessment & Plan  Secondary to room arthritis and multiple surgeries for complications of this condition  Analgesia as clinically appropriate, caution at this point with soft blood pressure    Hypophosphatemia  Assessment & Plan  Replete, goal greater than 2.5, ERP    Hypokalemia- (present on admission)  Assessment & Plan  Replete, goal 4.0, ERP    Rheumatoid arthritis (HCC)- (present on admission)  Assessment & Plan  History of significant RA  History of prior multiple remittive agent use including methotrexate and Enbrel  Appears burnout on exam at this time  Monitor    Tobacco dependence- (present on admission)  Assessment & Plan  Smoking cessation will be encouraged when clinically appropriate  Query component of COPD?  Few wheezes on exam today continue NEBS  Likely needs PFTs at some point       VTE:  Heparin  Ulcer: H2 Antagonist  Lines: Central Line  Ongoing indication addressed    I have performed a physical exam and reviewed and updated ROS and Plan today (10/11/2019). In review of yesterday's note (10/10/2019), there are no changes except as documented above.     Discussed patient condition and risk of morbidity and/or mortality with Hospitalist, RN, RT, Pharmacy, Code status disscussed, Charge nurse / hot rounds and Patient  The patient remains critically ill.  Critical care time = 35 minutes in directly providing and coordinating critical care and extensive  data review.  No time overlap and excludes procedures.

## 2019-10-12 ENCOUNTER — APPOINTMENT (OUTPATIENT)
Dept: RADIOLOGY | Facility: MEDICAL CENTER | Age: 49
DRG: 329 | End: 2019-10-12
Attending: INTERNAL MEDICINE
Payer: MEDICAID

## 2019-10-12 PROCEDURE — 94668 MNPJ CHEST WALL SBSQ: CPT

## 2019-10-12 PROCEDURE — A9270 NON-COVERED ITEM OR SERVICE: HCPCS | Performed by: HOSPITALIST

## 2019-10-12 PROCEDURE — A9270 NON-COVERED ITEM OR SERVICE: HCPCS | Performed by: NURSE PRACTITIONER

## 2019-10-12 PROCEDURE — 700102 HCHG RX REV CODE 250 W/ 637 OVERRIDE(OP): Performed by: NURSE PRACTITIONER

## 2019-10-12 PROCEDURE — 94640 AIRWAY INHALATION TREATMENT: CPT

## 2019-10-12 PROCEDURE — A9270 NON-COVERED ITEM OR SERVICE: HCPCS | Performed by: INTERNAL MEDICINE

## 2019-10-12 PROCEDURE — 700111 HCHG RX REV CODE 636 W/ 250 OVERRIDE (IP): Performed by: INTERNAL MEDICINE

## 2019-10-12 PROCEDURE — 700101 HCHG RX REV CODE 250: Performed by: INTERNAL MEDICINE

## 2019-10-12 PROCEDURE — 99291 CRITICAL CARE FIRST HOUR: CPT | Performed by: PSYCHIATRY & NEUROLOGY

## 2019-10-12 PROCEDURE — 700102 HCHG RX REV CODE 250 W/ 637 OVERRIDE(OP): Performed by: HOSPITALIST

## 2019-10-12 PROCEDURE — 700102 HCHG RX REV CODE 250 W/ 637 OVERRIDE(OP): Performed by: INTERNAL MEDICINE

## 2019-10-12 PROCEDURE — 770022 HCHG ROOM/CARE - ICU (200)

## 2019-10-12 PROCEDURE — 71045 X-RAY EXAM CHEST 1 VIEW: CPT

## 2019-10-12 PROCEDURE — 94669 MECHANICAL CHEST WALL OSCILL: CPT

## 2019-10-12 PROCEDURE — 99232 SBSQ HOSP IP/OBS MODERATE 35: CPT | Performed by: HOSPITALIST

## 2019-10-12 RX ORDER — GABAPENTIN 400 MG/1
800 CAPSULE ORAL 4 TIMES DAILY
Status: DISCONTINUED | OUTPATIENT
Start: 2019-10-12 | End: 2019-10-13

## 2019-10-12 RX ADMIN — OXYCODONE HYDROCHLORIDE 10 MG: 5 TABLET ORAL at 10:11

## 2019-10-12 RX ADMIN — DIBASIC SODIUM PHOSPHATE, MONOBASIC POTASSIUM PHOSPHATE AND MONOBASIC SODIUM PHOSPHATE 2 TABLET: 852; 155; 130 TABLET ORAL at 18:07

## 2019-10-12 RX ADMIN — CALCIUM POLYCARBOPHIL 625 MG: 625 TABLET, FILM COATED ORAL at 08:09

## 2019-10-12 RX ADMIN — POTASSIUM CHLORIDE 40 MEQ: 1500 TABLET, EXTENDED RELEASE ORAL at 06:09

## 2019-10-12 RX ADMIN — IPRATROPIUM BROMIDE AND ALBUTEROL SULFATE 3 ML: .5; 3 SOLUTION RESPIRATORY (INHALATION) at 20:35

## 2019-10-12 RX ADMIN — OXYCODONE HYDROCHLORIDE 10 MG: 5 TABLET ORAL at 22:34

## 2019-10-12 RX ADMIN — OXYCODONE HYDROCHLORIDE 10 MG: 5 TABLET ORAL at 06:09

## 2019-10-12 RX ADMIN — OXYCODONE HYDROCHLORIDE 10 MG: 5 TABLET ORAL at 14:17

## 2019-10-12 RX ADMIN — GABAPENTIN 800 MG: 400 CAPSULE ORAL at 10:11

## 2019-10-12 RX ADMIN — GABAPENTIN 800 MG: 400 CAPSULE ORAL at 20:55

## 2019-10-12 RX ADMIN — CALCIUM POLYCARBOPHIL 625 MG: 625 TABLET, FILM COATED ORAL at 10:30

## 2019-10-12 RX ADMIN — IPRATROPIUM BROMIDE AND ALBUTEROL SULFATE 3 ML: .5; 3 SOLUTION RESPIRATORY (INHALATION) at 23:07

## 2019-10-12 RX ADMIN — OXYCODONE HYDROCHLORIDE 10 MG: 5 TABLET ORAL at 01:51

## 2019-10-12 RX ADMIN — HEPARIN SODIUM 5000 UNITS: 5000 INJECTION, SOLUTION INTRAVENOUS; SUBCUTANEOUS at 14:00

## 2019-10-12 RX ADMIN — IPRATROPIUM BROMIDE AND ALBUTEROL SULFATE 3 ML: .5; 3 SOLUTION RESPIRATORY (INHALATION) at 02:08

## 2019-10-12 RX ADMIN — CALCIUM POLYCARBOPHIL 625 MG: 625 TABLET, FILM COATED ORAL at 18:07

## 2019-10-12 RX ADMIN — GABAPENTIN 800 MG: 400 CAPSULE ORAL at 18:08

## 2019-10-12 RX ADMIN — OXYCODONE HYDROCHLORIDE 10 MG: 5 TABLET ORAL at 18:08

## 2019-10-12 RX ADMIN — POTASSIUM CHLORIDE 40 MEQ: 1500 TABLET, EXTENDED RELEASE ORAL at 18:07

## 2019-10-12 RX ADMIN — OMEPRAZOLE 20 MG: 20 CAPSULE, DELAYED RELEASE ORAL at 06:10

## 2019-10-12 RX ADMIN — GABAPENTIN 800 MG: 400 CAPSULE ORAL at 14:17

## 2019-10-12 RX ADMIN — GUAIFENESIN 600 MG: 600 TABLET, EXTENDED RELEASE ORAL at 18:07

## 2019-10-12 RX ADMIN — GUAIFENESIN 600 MG: 600 TABLET, EXTENDED RELEASE ORAL at 06:09

## 2019-10-12 RX ADMIN — HEPARIN SODIUM 5000 UNITS: 5000 INJECTION, SOLUTION INTRAVENOUS; SUBCUTANEOUS at 06:09

## 2019-10-12 RX ADMIN — OMEPRAZOLE 20 MG: 20 CAPSULE, DELAYED RELEASE ORAL at 18:08

## 2019-10-12 RX ADMIN — HEPARIN SODIUM 5000 UNITS: 5000 INJECTION, SOLUTION INTRAVENOUS; SUBCUTANEOUS at 20:55

## 2019-10-12 RX ADMIN — PAROXETINE HYDROCHLORIDE 60 MG: 20 TABLET, FILM COATED ORAL at 18:08

## 2019-10-12 RX ADMIN — QUETIAPINE FUMARATE 100 MG: 100 TABLET ORAL at 18:08

## 2019-10-12 RX ADMIN — DIBASIC SODIUM PHOSPHATE, MONOBASIC POTASSIUM PHOSPHATE AND MONOBASIC SODIUM PHOSPHATE 2 TABLET: 852; 155; 130 TABLET ORAL at 06:08

## 2019-10-12 RX ADMIN — IPRATROPIUM BROMIDE AND ALBUTEROL SULFATE 3 ML: .5; 3 SOLUTION RESPIRATORY (INHALATION) at 06:49

## 2019-10-12 RX ADMIN — ACETAMINOPHEN 650 MG: 325 TABLET, FILM COATED ORAL at 22:34

## 2019-10-12 RX ADMIN — IPRATROPIUM BROMIDE AND ALBUTEROL SULFATE 3 ML: .5; 3 SOLUTION RESPIRATORY (INHALATION) at 11:39

## 2019-10-12 RX ADMIN — IPRATROPIUM BROMIDE AND ALBUTEROL SULFATE 3 ML: .5; 3 SOLUTION RESPIRATORY (INHALATION) at 16:16

## 2019-10-12 ASSESSMENT — COGNITIVE AND FUNCTIONAL STATUS - GENERAL
SUGGESTED CMS G CODE MODIFIER MOBILITY: CK
PERSONAL GROOMING: A LITTLE
DRESSING REGULAR UPPER BODY CLOTHING: A LOT
MOBILITY SCORE: 19
DRESSING REGULAR LOWER BODY CLOTHING: A LOT
TOILETING: A LOT
MOVING TO AND FROM BED TO CHAIR: A LITTLE
DAILY ACTIVITIY SCORE: 15
WALKING IN HOSPITAL ROOM: A LITTLE
STANDING UP FROM CHAIR USING ARMS: A LITTLE
MOVING FROM LYING ON BACK TO SITTING ON SIDE OF FLAT BED: A LITTLE
SUGGESTED CMS G CODE MODIFIER DAILY ACTIVITY: CK
CLIMB 3 TO 5 STEPS WITH RAILING: A LITTLE
HELP NEEDED FOR BATHING: A LOT

## 2019-10-12 ASSESSMENT — ENCOUNTER SYMPTOMS
HEADACHES: 0
DOUBLE VISION: 0
BLURRED VISION: 0
WEAKNESS: 1
CHILLS: 0
ABDOMINAL PAIN: 1
PALPITATIONS: 0
SORE THROAT: 0
DIARRHEA: 0
BACK PAIN: 0
SHORTNESS OF BREATH: 1
LOSS OF CONSCIOUSNESS: 0
DIZZINESS: 0
NAUSEA: 0
COUGH: 0
VOMITING: 0
FEVER: 0

## 2019-10-12 NOTE — PROGRESS NOTES
Critical Care Progress Note    Date of admission  10/4/2019    Chief Complaint  49 y.o. female admitted 10/4/2019 with hypoxemia    Hospital Course    49 y.o. female with a significant past medical history of rheumatoid arthritis who presented 10/4/2019 with abdominal pain and found to have perforated duodenum and was taken to exploratory laparotomy with repair by Dr. Chaudhari on 10/4.  Patient clinically had been improving, abdominal drains pulled, patient had a mobilize and doing well.  Patient been on prophylaxis for DVT throughout.  Patient has no history of DVT or PE.  She denies any bleeding history on extensive review from head to toe.  There is no family history of DVT, PE or bleeding history.  She is never taken anticoagulation of any kind.  Today around 430 she developed fairly sudden onset of shortness of breath.  Oxygen requirements increased dramatically and she currently is on 100% FiO2 50 L flow by high flow nasal cannula system to get her sats into the 90s.  Blood pressures been a little soft with SBP 85-95.  Heart rate had been running in the 70s 80s and is now in the 90s.  She denies chest pain at this time and has had no hemoptysis.  Chest x-ray reveals minimal RLL-right infrahilar region atelectasis or opacity but actually is the same or slightly better from prior film      Interval Problem Update  Reviewed last 24 hour events:  - WBC 14.->14.1   - Neuro:    - HR: 70-90s   - SBP: 80-100s (norepi @3)   - GI: reg diet   - UOP: 1.6L   - Sullivan: yes   - Tm: 37.6   - Lines: R IJ   - PPx: GI omeprazole, DVT Heparin   - HFNC 40/40   - CT Chest Mucus plugging but no PE   - ABX: ceftriaxone, metronidazole    Review of Systems  ROS     Vital Signs for last 24 hours   Pulse:  [] 91  Resp:  [8-48] 27  BP: ()/(50-73) 92/56  SpO2:  [87 %-98 %] 94 %    Hemodynamic parameters for last 24 hours  CVP:  [0 MM HG-6 MM HG] 5 MM HG    Respiratory Information for the last 24 hours       Physical Exam    Physical Exam   Constitutional: She is oriented to person, place, and time. She appears well-developed and well-nourished. She appears distressed.   HENT:   Head: Normocephalic and atraumatic.   Mouth/Throat: Oropharynx is clear and moist.   Eyes: Pupils are equal, round, and reactive to light. Conjunctivae are normal.   Neck: No JVD present.   Cardiovascular: Normal rate, regular rhythm and intact distal pulses.   Pulmonary/Chest: She has wheezes. She has rales.   Abdominal: Soft. There is tenderness.   Musculoskeletal: She exhibits no edema.   Neurological: She is alert and oriented to person, place, and time. No cranial nerve deficit.   Skin: Skin is warm and dry. She is not diaphoretic.   Psychiatric: She has a normal mood and affect. Her behavior is normal.       Medications  Current Facility-Administered Medications   Medication Dose Route Frequency Provider Last Rate Last Dose   • norepinephrine (LEVOPHED) 8 mg in  mL Infusion  0-30 mcg/min Intravenous Continuous Rishabh Bautista M.D.   Stopped at 10/11/19 1255   • Respiratory Care per Protocol   Nebulization Continuous RT Kavon Bangura M.D.       • ipratropium-albuterol (DUONEB) nebulizer solution  3 mL Nebulization Q4HRS (RT) Paul Bernard M.D.   3 mL at 10/12/19 0208   • guaiFENesin LA (MUCINEX) tablet 600 mg  600 mg Oral Q12HRS Paul Bernard M.D.   600 mg at 10/12/19 0609   • potassium chloride SA (Kdur) tablet 40 mEq  40 mEq Oral BID Kavon Bangura M.D.   40 mEq at 10/12/19 0609   • phosphorus (K-PHOS-NEUTRAL, PHOSPHA 250 NEUTRAL) per tablet 2 Tab  2 Tab Oral BID Kavon Bangura M.D.   2 Tab at 10/12/19 0608   • omeprazole (PRILOSEC) capsule 20 mg  20 mg Oral Q12HRS Kavon Bangura M.D.   20 mg at 10/12/19 0610   • PARoxetine (PAXIL) tablet 60 mg  60 mg Oral Q EVENING Kavon Bangura M.D.   60 mg at 10/11/19 1727   • QUEtiapine (SEROQUEL) tablet 100 mg  100 mg Oral Q EVENING Kavon Bangura M.D.   100 mg at 10/11/19 172   • calcium polycarbophil  (FIBERCON) tablet 625 mg  625 mg Oral TID WITH MEALS Dai Guidry M.D.   625 mg at 10/11/19 1726    And   • docusate sodium (COLACE) capsule 100 mg  100 mg Oral QDAY PRN Dai Guidry M.D.   100 mg at 10/09/19 2106    And   • magnesium hydroxide (MILK OF MAGNESIA) suspension 30 mL  30 mL Oral QDAY PRN Dai Guidry M.D.       • oxyCODONE immediate-release (ROXICODONE) tablet 5-10 mg  5-10 mg Oral Q4HRS PRN Kristi Sullivan A.P.RDeeNDee   10 mg at 10/12/19 0609   • heparin injection 5,000 Units  5,000 Units Subcutaneous Q8HRS Perfecto Bernardo M.D.   5,000 Units at 10/12/19 0609   • acetaminophen (TYLENOL) tablet 650 mg  650 mg Oral Q6HRS PRN Perfecto Bernardo M.D.   650 mg at 10/11/19 1154   • ondansetron (ZOFRAN) syringe/vial injection 4 mg  4 mg Intravenous Q4HRS PRN Perfecto Bernardo M.D.   4 mg at 10/11/19 1752   • ondansetron (ZOFRAN ODT) dispertab 4 mg  4 mg Oral Q4HRS PRN Perfecto Bernardo M.D.           Fluids    Intake/Output Summary (Last 24 hours) at 10/12/2019 0644  Last data filed at 10/12/2019 0600  Gross per 24 hour   Intake 469.88 ml   Output 2270 ml   Net -1800.12 ml       Laboratory          Recent Labs     10/10/19  0349 10/10/19  2338 10/11/19  0535   SODIUM 138 136 136   POTASSIUM 3.9 4.2 4.2   CHLORIDE 106 97 102   CO2 25 27 27   BUN 3* 6* 5*   CREATININE 0.55 0.79 0.62   PHOSPHORUS  --   --  3.8   CALCIUM 8.3* 8.2* 8.0*     Recent Labs     10/10/19  0349 10/10/19  2338 10/11/19  0535   GLUCOSE 121* 93 101*     Recent Labs     10/10/19  0349 10/10/19  2338 10/11/19  0535   WBC 12.6* 14.9* 14.1*   NEUTSPOLYS 83.50* 75.60* 67.90   LYMPHOCYTES 6.90* 15.60* 18.20*   MONOCYTES 6.10 6.10 10.90   EOSINOPHILS 0.90 0.90 1.50   BASOPHILS 1.70 0.90 0.40     Recent Labs     10/10/19  0349 10/10/19  2338 10/11/19  0535   RBC 3.19* 3.38* 2.99*   HEMOGLOBIN 10.5* 10.9* 9.8*   HEMATOCRIT 32.4* 34.1* 30.5*   PLATELETCT 286 337 310       Imaging  X-Ray:  I have personally reviewed the images and compared with  prior images.    Assessment/Plan  * Acute hypoxemic respiratory failure (HCC)  Assessment & Plan  Likely secodnary mucus plug, PNA, atelectasis  CXs negative  Active titration of high flow nasal cannula  Pulmonary toilet with RT      Given her RA careful manipulation if needs intubation due potentially unstable cervical spine    Duodenal perforation (HCC)- (present on admission)  Assessment & Plan  Status post ex lap with DU repair 10/4 by Dr. Chaudhari  No clinical evidence of bleeding and no history of bleeding diathesis or thrombosis for that matter    Anemia- (present on admission)  Assessment & Plan  Multifactorial including recent surgery and RA  No active bleeding clinically  Serial CBC    Leukocytosis- (present on admission)  Assessment & Plan  WBC stable at 14  No significant fever, T-max 99.6 last 24 hours  Procalcitonin obtained earlier today at 0.19-normal  Off pressors    Septic shock (HCC)  Assessment & Plan  This is Sepsis Not present on admission  SIRS criteria identified on my evaluation include: Tachycardia, with heart rate greater than 90 BPM and Leukopenia, with WBC less than 4,000. Hypotensive  Source is respiratory  Sepsis protocol initiated  Fluid resuscitation ordered per protocol  IV antibiotics as appropriate for source of sepsis  While organ dysfunction may be noted elsewhere in this problem list or in the chart, degree of organ dysfunction does not meet CMS criteria for severe sepsis    Improved  Off pressors      Chronic pain  Assessment & Plan  Secondary to room arthritis and multiple surgeries for complications of this condition  Analgesia as clinically appropriate, caution at this point with soft blood pressure    Hypophosphatemia  Assessment & Plan  Replete, goal greater than 2.5, ERP    Hypokalemia- (present on admission)  Assessment & Plan  Replete, goal 4.0, ERP    Rheumatoid arthritis (HCC)- (present on admission)  Assessment & Plan  History of significant RA  History of prior  multiple remittive agent use including methotrexate and Enbrel  Appears burnout on exam at this time  Monitor    Tobacco dependence- (present on admission)  Assessment & Plan  Smoking cessation will be encouraged when clinically appropriate  Query component of COPD?  Few wheezes on exam today continue NEBS  Likely needs PFTs at some point       VTE:  Heparin  Ulcer: H2 Antagonist  Lines: Central Line  Ongoing indication addressed    I have performed a physical exam and reviewed and updated ROS and Plan today (10/12/2019). In review of yesterday's note (10/11/2019), there are no changes except as documented above.     Discussed patient condition and risk of morbidity and/or mortality with Hospitalist, RN, RT, Pharmacy, Code status disscussed, Charge nurse / hot rounds and Patient  The patient remains critically ill.  Critical care time = 35 minutes in directly providing and coordinating critical care and extensive data review.  No time overlap and excludes procedures.

## 2019-10-12 NOTE — PROGRESS NOTES
Hospital Medicine Daily Progress Note    Date of Service  10/12/2019    Chief Complaint  49 y.o. female admitted 10/4/2019 with abdominal pain    Hospital Course    PMHx RA. Sudden onset of abd pain.  CT shwoing free air in retroperitoneum.  Given location, Urology and General Surgery consulted.   Taken to the OR and found to have a perforated duodenum which was primarily repaired.    Came back to the ICU on 10/10 after increasing hypoxia and CXR showing RLL pneumonia.  CTA chest done at that time was neg for PE        Interval Problem Update  States she is breathing more easily this am  Sinus 80-90s  SBP 80-90s  Off pressors  Tmax 100.8  HFNC 40/50    Consultants/Specialty  CC  Gen Srgry    Code Status  full    Disposition  Cont in ICU    Critical care time 35 mins managing undiferentiated shock, titrating fluids and vasopressors, completing acute work up.  Pt seen and rounded on x 3 today    Review of Systems  Review of Systems   Constitutional: Negative for chills and fever.   HENT: Negative for nosebleeds and sore throat.    Eyes: Negative for blurred vision and double vision.   Respiratory: Positive for shortness of breath. Negative for cough.    Cardiovascular: Negative for chest pain, palpitations and leg swelling.   Gastrointestinal: Positive for abdominal pain. Negative for diarrhea, nausea and vomiting.   Genitourinary: Negative for dysuria and urgency.   Musculoskeletal: Positive for joint pain. Negative for back pain.   Skin: Negative for rash.   Neurological: Positive for weakness. Negative for dizziness, loss of consciousness and headaches.        Physical Exam  Pulse:  [] 89  Resp:  [8-48] 23  BP: ()/(50-73) 96/54  SpO2:  [87 %-98 %] 94 %    Physical Exam   Constitutional: She is oriented to person, place, and time. She appears well-developed and well-nourished. No distress.   HENT:   Head: Normocephalic and atraumatic.   Nose: Nose normal.   Mouth/Throat: Oropharynx is clear and moist.    Eyes: Conjunctivae are normal. No scleral icterus.   Neck: No JVD present.   Cardiovascular: Normal rate and regular rhythm.   Murmur heard.  Pulmonary/Chest: Effort normal. No stridor. No respiratory distress. She has no wheezes. She has no rales.   Abdominal: Soft. Bowel sounds are normal. There is no tenderness. There is no rebound and no guarding.   Midline incision no evidence of infection.  Min difuse TTP, no G/R, soft throughout.     Musculoskeletal: She exhibits no edema.   Neurological: She is alert and oriented to person, place, and time.   Skin: Skin is warm and dry. No rash noted. She is not diaphoretic.   Psychiatric: She has a normal mood and affect. Thought content normal.   Nursing note and vitals reviewed.      Fluids    Intake/Output Summary (Last 24 hours) at 10/12/2019 0536  Last data filed at 10/12/2019 0400  Gross per 24 hour   Intake 696.87 ml   Output 2565 ml   Net -1868.13 ml       Laboratory  Recent Labs     10/10/19  0349 10/10/19  2338 10/11/19  0535   WBC 12.6* 14.9* 14.1*   RBC 3.19* 3.38* 2.99*   HEMOGLOBIN 10.5* 10.9* 9.8*   HEMATOCRIT 32.4* 34.1* 30.5*   .6* 100.9* 102.0*   MCH 32.9 32.2 32.8   MCHC 32.4* 32.0* 32.1*   RDW 58.4* 57.5* 59.0*   PLATELETCT 286 337 310   MPV 10.3 10.2 10.1     Recent Labs     10/10/19  0349 10/10/19  2338 10/11/19  0535   SODIUM 138 136 136   POTASSIUM 3.9 4.2 4.2   CHLORIDE 106 97 102   CO2 25 27 27   GLUCOSE 121* 93 101*   BUN 3* 6* 5*   CREATININE 0.55 0.79 0.62   CALCIUM 8.3* 8.2* 8.0*                   Imaging  EC-ECHOCARDIOGRAM COMPLETE W/ CONT   Final Result      US-EXTREMITY VENOUS LOWER BILAT   Final Result      DX-CHEST-LIMITED (1 VIEW)   Final Result         1.  Pulmonary edema and/or infiltrates are identified, which appear somewhat increased since the prior exam.      CT-CTA CHEST PULMONARY ARTERY W/ RECONS   Final Result         1.  No pulmonary embolus appreciated.   2.  Mucous plugging of the right lower lobe bronchi with  complete collapse and atelectasis in the right lower lobe. Consider bronchoscopy.   3.  Scattered hazy groundglass pulmonary opacities could represent atypical infiltrates or edema.   4.  Fluid in the retroperitoneum tracking adjacent to the right kidney, appearance suggests abscess. Given history of duodenal perforation, or residual leak cannot be excluded.   5.  Prominent bilateral hilar and mediastinal lymph nodes, workup and evaluation for causes of adenopathy as clinically appropriate.   6.  Right nephrolithiasis      DX-CHEST-PORTABLE (1 VIEW)   Final Result         1.  Hazy right infrahilar density suggests infiltrate.      DX-CHEST-PORTABLE (1 VIEW)   Final Result      Probable moderate RIGHT subpulmonic pleural effusion.  Superimposed pneumonia is not excluded.      CT-ABDOMEN-PELVIS WITH   Final Result   Addendum 1 of 1   Addendum: There is focus of air seen adjacent to the second portion of the    duodenum, the duodenal wall appears slightly thickened. Consider duodenal    ulcer/perforation as etiology of these findings.      These findings were discussed with the patient's clinician, MARSHALL DEL VALLE,    on 10/4/2019 7:27 AM.      Final      DX-CHEST-PORTABLE (1 VIEW)    (Results Pending)        Assessment/Plan  * Acute hypoxemic respiratory failure (HCC)  Assessment & Plan  Mucous plug noted on CT chest: neg for PE  Much improved with pulmonary toilet  Hold off on Bronch  DC abx's as doubt HCAP  Cont to follow cultures and CBC  Pt conts to feel better  O2 demand decreasing  Cont O2/Resp protocols  Mobilize  hydrate    Hypoxia  Assessment & Plan  Fu CXR with effusion, ? Right sided infiltrate. Patient afebrile. But has cough, congestion  Start IV lasix  Check pro calcitonin for signs of bacterial infxn  Follow clinically .  Encourage IS  Mobilize   o2 nc support-- wean as axel.    Duodenal perforation (HCC)- (present on admission)  Assessment & Plan  Status post exploratory laparotomy and surgical repair  10/4-clinically doing well.  Decreased abdominal pain, tolerating liquids  Completed her antibiotic course  Blood cultures neg  Continue with Prilosec twice daily  GI soft dysphasia diet as tolerated  Pain control with PRN oxycodone  DC IV morphine  Hep-Lock IV fluids    Hoarseness- (present on admission)  Assessment & Plan  Postprocedure, could be anesthesia/intubation related-clearing.  Continue with Dysphagia 3 diet , SLP evaluation  Stable respiratory symptoms, continue close clinical monitoring      Anemia- (present on admission)  Assessment & Plan  Partly ACD with iatrogenic component  Stable  Cont to follow    Leukocytosis- (present on admission)  Assessment & Plan  Associate with duodenal perforation.  Blood cultures negative.  Afebrile.   Patient with cough, congestion with right-sided pleural effusion, ?  Infiltrate.  Follow clinically for signs of pneumonia, check procalcitonin level      Septic shock (HCC)  Assessment & Plan  Hypovolemic vs cardiogenic vs septic vs distributive (PE) vs [id number]  Doubt septic: have DC'd Abx's cont to follow closely  Has been volume resuscitated  CTA neg for PE  Checking Echo  Possibly component of vasoplegia  Cont to titrate David, decrease MAP goal to 60  Good UOP  Pt is Asx'c    Chronic pain  Assessment & Plan  Assoc with arthritis   Resume Neurontin.      Hypophosphatemia  Assessment & Plan  Continue  Neutra-Phos  Monitor levels as will be risk for refeeding syndrome    Hypokalemia- (present on admission)  Assessment & Plan  Corrected   continue with oral potassium  Follow-up BMP    Rheumatoid arthritis (HCC)- (present on admission)  Assessment & Plan  History of with chronic joint pain.  PRN oxycodone, Tylenol  On Enbrel as outpt; long Hx of steroids but none in last few years  Outpatient rheumatology follow-up    Tobacco dependence- (present on admission)  Assessment & Plan  Patient has been counseled and educated regarding cessation  Continue to emphasize cessation        VTE prophylaxis: heparin

## 2019-10-12 NOTE — PROGRESS NOTES
RN verbalized the need to get up and mobilize to the patient. Pt got very agitated and verbally aggressive towards nurse for the suggestion of mobility despite the education of the importance.

## 2019-10-12 NOTE — CARE PLAN
Problem: Safety  Goal: Will remain free from injury  Note:   Safety and fall precautions in place, bed in lowest position. Pt room near nursing station. Bed alarm on.        Problem: Knowledge Deficit  Goal: Knowledge of disease process/condition, treatment plan, diagnostic tests, and medications will improve  Note:   Bedside report received.  POC discussed with patient, addressed her questions and concerns to the best of my ability.

## 2019-10-12 NOTE — CARE PLAN
Pain medications administered as ordered, non- pharmacologic comfort measures in place  Bed alarm in use, bed in the lowest position, call light within reach, frequent rounding, room near nursing station

## 2019-10-12 NOTE — PROGRESS NOTES
Pt continues to refuse mobility after education was provided several times. Pt also refusing to let RN remove stat lock sticker from left inner thigh (as this is causing pressure on her right inner thigh). Education provided, pt still refusing to have it removed at the moment.

## 2019-10-12 NOTE — RESPIRATORY CARE
"COPD EDUCATION by COPD CLINICAL EDUCATOR  10/11/2019 at 5:44 PM by Sruthi Owens     Patient interviewed by COPD education team. Patient refused smoking cessation at this time. No hx or formal COPD dx.  Smoking Cessation Intervention 3 -10 minutes completed. Pt refused packet with \"Tips to Quit\" and flyer for \"Free Smoking Cessation Classes\".   "

## 2019-10-13 ENCOUNTER — APPOINTMENT (OUTPATIENT)
Dept: RADIOLOGY | Facility: MEDICAL CENTER | Age: 49
DRG: 329 | End: 2019-10-13
Attending: INTERNAL MEDICINE
Payer: MEDICAID

## 2019-10-13 LAB
ANION GAP SERPL CALC-SCNC: 10 MMOL/L (ref 0–11.9)
BUN SERPL-MCNC: 7 MG/DL (ref 8–22)
CALCIUM SERPL-MCNC: 8.7 MG/DL (ref 8.5–10.5)
CHLORIDE SERPL-SCNC: 96 MMOL/L (ref 96–112)
CO2 SERPL-SCNC: 26 MMOL/L (ref 20–33)
CREAT SERPL-MCNC: 1 MG/DL (ref 0.5–1.4)
ERYTHROCYTE [DISTWIDTH] IN BLOOD BY AUTOMATED COUNT: 58 FL (ref 35.9–50)
GLUCOSE SERPL-MCNC: 97 MG/DL (ref 65–99)
HCT VFR BLD AUTO: 32.4 % (ref 37–47)
HGB BLD-MCNC: 10.6 G/DL (ref 12–16)
MCH RBC QN AUTO: 33.5 PG (ref 27–33)
MCHC RBC AUTO-ENTMCNC: 32.7 G/DL (ref 33.6–35)
MCV RBC AUTO: 102.5 FL (ref 81.4–97.8)
PHOSPHATE SERPL-MCNC: 4.8 MG/DL (ref 2.5–4.5)
PLATELET # BLD AUTO: 326 K/UL (ref 164–446)
PMV BLD AUTO: 10.5 FL (ref 9–12.9)
POTASSIUM SERPL-SCNC: 4.7 MMOL/L (ref 3.6–5.5)
RBC # BLD AUTO: 3.16 M/UL (ref 4.2–5.4)
SODIUM SERPL-SCNC: 132 MMOL/L (ref 135–145)
WBC # BLD AUTO: 12.8 K/UL (ref 4.8–10.8)

## 2019-10-13 PROCEDURE — 700101 HCHG RX REV CODE 250: Performed by: INTERNAL MEDICINE

## 2019-10-13 PROCEDURE — 700102 HCHG RX REV CODE 250 W/ 637 OVERRIDE(OP): Performed by: INTERNAL MEDICINE

## 2019-10-13 PROCEDURE — 94640 AIRWAY INHALATION TREATMENT: CPT

## 2019-10-13 PROCEDURE — 770006 HCHG ROOM/CARE - MED/SURG/GYN SEMI*

## 2019-10-13 PROCEDURE — A9270 NON-COVERED ITEM OR SERVICE: HCPCS | Performed by: HOSPITALIST

## 2019-10-13 PROCEDURE — 700102 HCHG RX REV CODE 250 W/ 637 OVERRIDE(OP): Performed by: HOSPITALIST

## 2019-10-13 PROCEDURE — 99232 SBSQ HOSP IP/OBS MODERATE 35: CPT | Performed by: HOSPITALIST

## 2019-10-13 PROCEDURE — A9270 NON-COVERED ITEM OR SERVICE: HCPCS | Performed by: NURSE PRACTITIONER

## 2019-10-13 PROCEDURE — 80048 BASIC METABOLIC PNL TOTAL CA: CPT

## 2019-10-13 PROCEDURE — 71045 X-RAY EXAM CHEST 1 VIEW: CPT

## 2019-10-13 PROCEDURE — 700111 HCHG RX REV CODE 636 W/ 250 OVERRIDE (IP): Performed by: INTERNAL MEDICINE

## 2019-10-13 PROCEDURE — 700102 HCHG RX REV CODE 250 W/ 637 OVERRIDE(OP): Performed by: NURSE PRACTITIONER

## 2019-10-13 PROCEDURE — 85027 COMPLETE CBC AUTOMATED: CPT

## 2019-10-13 PROCEDURE — A9270 NON-COVERED ITEM OR SERVICE: HCPCS | Performed by: INTERNAL MEDICINE

## 2019-10-13 PROCEDURE — 84100 ASSAY OF PHOSPHORUS: CPT

## 2019-10-13 PROCEDURE — 94669 MECHANICAL CHEST WALL OSCILL: CPT

## 2019-10-13 RX ORDER — GABAPENTIN 400 MG/1
800 CAPSULE ORAL 3 TIMES DAILY
Status: DISCONTINUED | OUTPATIENT
Start: 2019-10-13 | End: 2019-10-14 | Stop reason: HOSPADM

## 2019-10-13 RX ORDER — IPRATROPIUM BROMIDE AND ALBUTEROL SULFATE 2.5; .5 MG/3ML; MG/3ML
3 SOLUTION RESPIRATORY (INHALATION)
Status: DISCONTINUED | OUTPATIENT
Start: 2019-10-13 | End: 2019-10-14 | Stop reason: HOSPADM

## 2019-10-13 RX ORDER — NICOTINE 21 MG/24HR
21 PATCH, TRANSDERMAL 24 HOURS TRANSDERMAL
Status: DISCONTINUED | OUTPATIENT
Start: 2019-10-13 | End: 2019-10-14 | Stop reason: HOSPADM

## 2019-10-13 RX ORDER — IPRATROPIUM BROMIDE AND ALBUTEROL SULFATE 2.5; .5 MG/3ML; MG/3ML
3 SOLUTION RESPIRATORY (INHALATION)
Status: DISCONTINUED | OUTPATIENT
Start: 2019-10-13 | End: 2019-10-13

## 2019-10-13 RX ADMIN — QUETIAPINE FUMARATE 100 MG: 100 TABLET ORAL at 18:12

## 2019-10-13 RX ADMIN — OMEPRAZOLE 20 MG: 20 CAPSULE, DELAYED RELEASE ORAL at 05:43

## 2019-10-13 RX ADMIN — GUAIFENESIN 600 MG: 600 TABLET, EXTENDED RELEASE ORAL at 06:23

## 2019-10-13 RX ADMIN — OMEPRAZOLE 20 MG: 20 CAPSULE, DELAYED RELEASE ORAL at 18:12

## 2019-10-13 RX ADMIN — OXYCODONE HYDROCHLORIDE 5 MG: 5 TABLET ORAL at 05:43

## 2019-10-13 RX ADMIN — ONDANSETRON 4 MG: 2 INJECTION INTRAMUSCULAR; INTRAVENOUS at 13:11

## 2019-10-13 RX ADMIN — PAROXETINE HYDROCHLORIDE 60 MG: 20 TABLET, FILM COATED ORAL at 18:12

## 2019-10-13 RX ADMIN — ACETAMINOPHEN 650 MG: 325 TABLET, FILM COATED ORAL at 05:43

## 2019-10-13 RX ADMIN — IPRATROPIUM BROMIDE AND ALBUTEROL SULFATE 3 ML: .5; 3 SOLUTION RESPIRATORY (INHALATION) at 14:19

## 2019-10-13 RX ADMIN — CALCIUM POLYCARBOPHIL 625 MG: 625 TABLET, FILM COATED ORAL at 20:01

## 2019-10-13 RX ADMIN — CALCIUM POLYCARBOPHIL 625 MG: 625 TABLET, FILM COATED ORAL at 07:10

## 2019-10-13 RX ADMIN — IPRATROPIUM BROMIDE AND ALBUTEROL SULFATE 3 ML: .5; 3 SOLUTION RESPIRATORY (INHALATION) at 04:56

## 2019-10-13 RX ADMIN — OXYCODONE HYDROCHLORIDE 10 MG: 5 TABLET ORAL at 20:01

## 2019-10-13 RX ADMIN — GUAIFENESIN 600 MG: 600 TABLET, EXTENDED RELEASE ORAL at 18:13

## 2019-10-13 RX ADMIN — NICOTINE TRANSDERMAL SYSTEM 21 MG: 21 PATCH, EXTENDED RELEASE TRANSDERMAL at 12:17

## 2019-10-13 RX ADMIN — IPRATROPIUM BROMIDE AND ALBUTEROL SULFATE 3 ML: .5; 3 SOLUTION RESPIRATORY (INHALATION) at 07:25

## 2019-10-13 RX ADMIN — OXYCODONE HYDROCHLORIDE 10 MG: 5 TABLET ORAL at 11:17

## 2019-10-13 RX ADMIN — OXYCODONE HYDROCHLORIDE 10 MG: 5 TABLET ORAL at 15:13

## 2019-10-13 RX ADMIN — HEPARIN SODIUM 5000 UNITS: 5000 INJECTION, SOLUTION INTRAVENOUS; SUBCUTANEOUS at 05:43

## 2019-10-13 RX ADMIN — HEPARIN SODIUM 5000 UNITS: 5000 INJECTION, SOLUTION INTRAVENOUS; SUBCUTANEOUS at 13:11

## 2019-10-13 RX ADMIN — POTASSIUM CHLORIDE 40 MEQ: 1500 TABLET, EXTENDED RELEASE ORAL at 05:43

## 2019-10-13 RX ADMIN — DIBASIC SODIUM PHOSPHATE, MONOBASIC POTASSIUM PHOSPHATE AND MONOBASIC SODIUM PHOSPHATE 2 TABLET: 852; 155; 130 TABLET ORAL at 05:43

## 2019-10-13 RX ADMIN — OXYCODONE HYDROCHLORIDE 5 MG: 5 TABLET ORAL at 07:10

## 2019-10-13 RX ADMIN — GABAPENTIN 800 MG: 400 CAPSULE ORAL at 11:17

## 2019-10-13 RX ADMIN — CALCIUM POLYCARBOPHIL 625 MG: 625 TABLET, FILM COATED ORAL at 11:18

## 2019-10-13 RX ADMIN — GABAPENTIN 800 MG: 400 CAPSULE ORAL at 18:13

## 2019-10-13 RX ADMIN — HEPARIN SODIUM 5000 UNITS: 5000 INJECTION, SOLUTION INTRAVENOUS; SUBCUTANEOUS at 23:06

## 2019-10-13 ASSESSMENT — ENCOUNTER SYMPTOMS
LOSS OF CONSCIOUSNESS: 0
NAUSEA: 0
COUGH: 0
VOMITING: 0
HEADACHES: 0
SHORTNESS OF BREATH: 1
BLURRED VISION: 0
WEAKNESS: 1
CHILLS: 0
FEVER: 0
BACK PAIN: 0
DIZZINESS: 0
PALPITATIONS: 0
DOUBLE VISION: 0
DIARRHEA: 0
ABDOMINAL PAIN: 0
SORE THROAT: 0

## 2019-10-13 NOTE — CARE PLAN
Problem: Pain Management  Goal: Pain level will decrease to patient's comfort goal  Outcome: PROGRESSING AS EXPECTED  Note:   Patient has no complaints of pain. Medicated with PRN pain medications by previous RN.     Problem: Knowledge Deficit  Goal: Knowledge of disease process/condition, treatment plan, diagnostic tests, and medications will improve  Outcome: PROGRESSING AS EXPECTED  Note:   Patient is a new transfer from ICU to [place] [id number]. Patient oriented to unit routine, call light, and room.

## 2019-10-13 NOTE — PROGRESS NOTES
Report called to [facility] RN. Pt. A&Ox4, new PIV in L FA flushing and draws back well. Vital signs stable. Chart, medications and all belongings sent with pt. Transport escorting via wheelchair. Pt. On 8 L oxymask.

## 2019-10-13 NOTE — PROGRESS NOTES
Received bedside report from RN Rain, pt care assumed, VSS, pt assessment complete. Pt AAOx4, c/o 6/10 pain at this time. No signs of acute distress noted at this time. POC discussed with pt and verbalizes no questions. Pt denies any additional needs at this time. Bed in lowest position, bed alarm on, pt educated on fall risk and verbalized understanding, call light within reach, hourly rounding initiated.

## 2019-10-13 NOTE — CARE PLAN
Problem: Pain Management  Goal: Pain level will decrease to patient's comfort goal  Outcome: PROGRESSING AS EXPECTED  Intervention: Follow pain managment plan developed in collaboration with patient and Interdisciplinary Team  Note:   Assessed client's pain using an RN assessment tool; 0 to 10 pain scale. Medicated client per MAR orders PRN and reassessed interventions. Provided repositioning, calm environment, and distraction. Patient resting well at this time.      Problem: Safety  Goal: Will remain free from falls  Note:   Patient educated to use call light for assistance. Fall precautions in place. Staff will assist with mobilization. Hourly rounding in place.

## 2019-10-13 NOTE — PROGRESS NOTES
Patient arrived to [place] [address] at approximately 1530. Patient is AOx4, no complaints of pain. On 5L O2 via oxy mask, O2 saturation 92%.  in place. Family at bedside. Safety precautions in place.

## 2019-10-13 NOTE — PROGRESS NOTES
[facility] Medicine Daily Progress Note    Date of Service  10/13/2019    Chief Complaint  49 y.o. female admitted 10/4/2019 with abdominal pain    Hospital Course    PMHx RA. Sudden onset of abd pain.  CT shwoing free air in retroperitoneum.  Given location, Urology and General Surgery consulted.   Taken to the OR and found to have a perforated duodenum which was primarily repaired.    Came back to the ICU on 10/10 after increasing hypoxia and CXR showing RLL pneumonia.  CTA chest done at that time was neg for PE        Interval Problem Update  Feels good this am.  Was up and walking the unit.  No SOB  Sinus 80-90s  SBP 90-110s  Off pressors  Tmax 100.9  On mask O2 10 litres    Consultants/Specialty  CC  Gen Srgry    Code Status  full    Disposition  Cont in ICU    Critical care time 35 mins managing undiferentiated shock, titrating fluids and vasopressors, completing acute work up.  Pt seen and rounded on x 3 today    Review of Systems  Review of Systems   Constitutional: Negative for chills and fever.   HENT: Negative for nosebleeds and sore throat.    Eyes: Negative for blurred vision and double vision.   Respiratory: Positive for shortness of breath. Negative for cough.    Cardiovascular: Negative for chest pain, palpitations and leg swelling.   Gastrointestinal: Negative for abdominal pain, diarrhea, nausea and vomiting.   Genitourinary: Negative for dysuria and urgency.   Musculoskeletal: Positive for joint pain. Negative for back pain.   Skin: Negative for rash.   Neurological: Positive for weakness. Negative for dizziness, loss of consciousness and headaches.        Physical Exam  Temp:  [37.1 °C (98.7 °F)-38.3 °C (100.9 °F)] 37.3 °C (99.1 °F)  Pulse:  [] 91  Resp:  [14-33] 16  BP: ()/(51-68) 94/66  SpO2:  [89 %-96 %] 96 %    Physical Exam   Constitutional: She is oriented to person, place, and time. She appears well-developed and well-nourished. No distress.   HENT:   Head: Normocephalic and  atraumatic.   Nose: Nose normal.   Mouth/Throat: Oropharynx is clear and moist.   Eyes: Conjunctivae are normal. No scleral icterus.   Neck: No JVD present.   Cardiovascular: Normal rate and regular rhythm.   Murmur heard.  Pulmonary/Chest: Effort normal. No stridor. No respiratory distress. She has no wheezes. She has no rales.   Abdominal: Soft. Bowel sounds are normal. There is no tenderness. There is no rebound and no guarding.   Midline incision no evidence of infection.  Min difuse TTP, no G/R, soft throughout.     Musculoskeletal: She exhibits no edema.   Neurological: She is alert and oriented to person, place, and time.   Skin: Skin is warm and dry. No rash noted. She is not diaphoretic.   Psychiatric: She has a normal mood and affect. Her behavior is normal. Judgment and thought content normal.   Nursing note and vitals reviewed.      Fluids    Intake/Output Summary (Last 24 hours) at 10/13/2019 0536  Last data filed at 10/13/2019 0406  Gross per 24 hour   Intake --   Output 1840 ml   Net -1840 ml       Laboratory  Recent Labs     10/10/19  2338 10/11/19  0535 10/13/19  0335   WBC 14.9* 14.1* 12.8*   RBC 3.38* 2.99* 3.16*   HEMOGLOBIN 10.9* 9.8* 10.6*   HEMATOCRIT 34.1* 30.5* 32.4*   .9* 102.0* 102.5*   MCH 32.2 32.8 33.5*   MCHC 32.0* 32.1* 32.7*   RDW 57.5* 59.0* 58.0*   PLATELETCT 337 310 326   MPV 10.2 10.1 10.5     Recent Labs     10/10/19  2338 10/11/19  0535 10/13/19  0325   SODIUM 136 136 132*   POTASSIUM 4.2 4.2 4.7   CHLORIDE 97 102 96   CO2 27 27 26   GLUCOSE 93 101* 97   BUN 6* 5* 7*   CREATININE 0.79 0.62 1.00   CALCIUM 8.2* 8.0* 8.7                   Imaging  DX-CHEST-PORTABLE (1 VIEW)   Final Result         1. No significant interval change.      EC-ECHOCARDIOGRAM COMPLETE W/ CONT   Final Result      US-EXTREMITY VENOUS LOWER BILAT   Final Result      DX-CHEST-LIMITED (1 VIEW)   Final Result         1.  Pulmonary edema and/or infiltrates are identified, which appear somewhat increased  since the prior exam.      CT-CTA CHEST PULMONARY ARTERY W/ RECONS   Final Result         1.  No pulmonary embolus appreciated.   2.  Mucous plugging of the right lower lobe bronchi with complete collapse and atelectasis in the right lower lobe. Consider bronchoscopy.   3.  Scattered hazy groundglass pulmonary opacities could represent atypical infiltrates or edema.   4.  Fluid in the retroperitoneum tracking adjacent to the right kidney, appearance suggests abscess. Given history of duodenal perforation, or residual leak cannot be excluded.   5.  Prominent bilateral hilar and mediastinal lymph nodes, workup and evaluation for causes of adenopathy as clinically appropriate.   6.  Right nephrolithiasis      DX-CHEST-PORTABLE (1 VIEW)   Final Result         1.  Hazy right infrahilar density suggests infiltrate.      DX-CHEST-PORTABLE (1 VIEW)   Final Result      Probable moderate RIGHT subpulmonic pleural effusion.  Superimposed pneumonia is not excluded.      CT-ABDOMEN-PELVIS WITH   Final Result   Addendum 1 of 1   Addendum: There is focus of air seen adjacent to the second portion of the    duodenum, the duodenal wall appears slightly thickened. Consider duodenal    ulcer/perforation as etiology of these findings.      These findings were discussed with the patient's clinician, MARSHALL DEL VALLE,    on 10/4/2019 7:27 AM.      Final      DX-CHEST-PORTABLE (1 VIEW)    (Results Pending)        Assessment/Plan  * Acute hypoxemic respiratory failure (HCC)  Assessment & Plan  Mucous plug noted on CT chest: neg for PE  Improving daily  Observing off Abx's  Cont to follow cultures and CBC  O2 demand decreasing  Cont O2/Resp protocols  Mobilize  hydrate    Hypoxia  Assessment & Plan  Fu CXR with effusion, ? Right sided infiltrate. Patient afebrile. But has cough, congestion  Start IV lasix  Check pro calcitonin for signs of bacterial infxn  Follow clinically .  Encourage IS  Mobilize   o2 nc support-- wean as axel.    Duodenal  perforation (HCC)- (present on admission)  Assessment & Plan  Status post exploratory laparotomy and surgical repair 10/4-clinically doing well.  Decreased abdominal pain, tolerating liquids  Completed her antibiotic course  Blood cultures neg  Continue with Prilosec twice daily  GI soft dysphasia diet as tolerated  Pain control with PRN oxycodone  DC IV morphine  Hep-Lock IV fluids  cont's to have low grade temps however WBC coming down, no abd pain, tolerating po well    Hoarseness- (present on admission)  Assessment & Plan  Postprocedure, could be anesthesia/intubation related-clearing.  Continue with Dysphagia 3 diet , SLP evaluation  Stable respiratory symptoms, continue close clinical monitoring      Anemia- (present on admission)  Assessment & Plan  Partly ACD with iatrogenic component  Stable  Cont to follow    Leukocytosis- (present on admission)  Assessment & Plan  Associate with duodenal perforation.  Blood cultures negative.  Afebrile.   Patient with cough, congestion with right-sided pleural effusion, ?  Infiltrate.  Follow clinically for signs of pneumonia, check procalcitonin level      Septic shock (HCC)  Assessment & Plan  Hypovolemic vs cardiogenic vs septic vs distributive (PE) vs [id number]  Doubt septic: have DC'd Abx's cont to follow closely  Has been volume resuscitated  CTA neg for PE  Checking Echo  Possibly component of vasoplegia  Cont to titrate David, decrease MAP goal to 60  Good UOP  Pt is Asx'c    Chronic pain  Assessment & Plan  Assoc with arthritis   Resume Neurontin.      Hypophosphatemia  Assessment & Plan  Continue  Neutra-Phos  Monitor levels as will be risk for refeeding syndrome    Hypokalemia- (present on admission)  Assessment & Plan  Corrected   continue with oral potassium  Follow-up BMP    Rheumatoid arthritis (HCC)- (present on admission)  Assessment & Plan  History of with chronic joint pain.  PRN oxycodone, Tylenol  On Enbrel as outpt; long Hx of steroids but none in last few  years  Outpatient rheumatology follow-up    Tobacco dependence- (present on admission)  Assessment & Plan  Patient has been counseled and educated regarding cessation  Continue to emphasize cessation       VTE prophylaxis: heparin

## 2019-10-13 NOTE — CARE PLAN
Respiratory Therapy Update     #SVN Performed: Yes (10/12/19 1616)    Cough: Moist;Non Productive (10/12/19 2307)  Sputum Amount: Unable to Evaluate (10/12/19 1600)  Sputum Color: Unable to Evaluate (10/12/19 1600)  Sputum Consistency: Unable to Evaluate (10/12/19 1600)    Heated Hi Flow Nasal Cannula (HHFNC): Yes (10/12/19 2307)  FiO2 (HHFNC): 40 (10/12/19 2307)  Flowrate (HHFNC): 40 (10/12/19 2307)    FiO2%: 40 % (10/13/19 0400)  O2 (LPM): 40 (10/13/19 0400)  O2 Daily Delivery Respiratory : Highflow Nasal Cannula (10/12/19 2307)    Breath Sounds  Pre/Post Intervention: Post Intervention Assessment (10/12/19 2307)  RUL Breath Sounds: Expiratory Wheezes (10/13/19 0406)  RML Breath Sounds: Diminished (10/13/19 0406)  RLL Breath Sounds: Diminished (10/13/19 0406)  NIXON Breath Sounds: Expiratory Wheezes (10/13/19 0406)  LLL Breath Sounds: Diminished (10/13/19 0406)    Events/Summary/Plan: attempted oxygen titration, pt could not tolerate, remains on 40/40 (10/12/19 2307)

## 2019-10-14 ENCOUNTER — APPOINTMENT (OUTPATIENT)
Dept: RADIOLOGY | Facility: MEDICAL CENTER | Age: 49
DRG: 329 | End: 2019-10-14
Attending: INTERNAL MEDICINE
Payer: MEDICAID

## 2019-10-14 ENCOUNTER — APPOINTMENT (OUTPATIENT)
Dept: RADIOLOGY | Facility: MEDICAL CENTER | Age: 49
DRG: 329 | End: 2019-10-14
Attending: NURSE PRACTITIONER
Payer: MEDICAID

## 2019-10-14 VITALS
RESPIRATION RATE: 16 BRPM | SYSTOLIC BLOOD PRESSURE: 100 MMHG | BODY MASS INDEX: 19.84 KG/M2 | HEART RATE: 100 BPM | OXYGEN SATURATION: 89 % | TEMPERATURE: 98.9 F | DIASTOLIC BLOOD PRESSURE: 71 MMHG | WEIGHT: 111.99 LBS | HEIGHT: 63 IN

## 2019-10-14 LAB
ANION GAP SERPL CALC-SCNC: 9 MMOL/L (ref 0–11.9)
BUN SERPL-MCNC: 5 MG/DL (ref 8–22)
CALCIUM SERPL-MCNC: 8.7 MG/DL (ref 8.5–10.5)
CHLORIDE SERPL-SCNC: 97 MMOL/L (ref 96–112)
CO2 SERPL-SCNC: 24 MMOL/L (ref 20–33)
CREAT SERPL-MCNC: 0.91 MG/DL (ref 0.5–1.4)
GLUCOSE SERPL-MCNC: 102 MG/DL (ref 65–99)
POTASSIUM SERPL-SCNC: 4.1 MMOL/L (ref 3.6–5.5)
SODIUM SERPL-SCNC: 130 MMOL/L (ref 135–145)

## 2019-10-14 PROCEDURE — 36415 COLL VENOUS BLD VENIPUNCTURE: CPT

## 2019-10-14 PROCEDURE — 99239 HOSP IP/OBS DSCHRG MGMT >30: CPT | Performed by: HOSPITALIST

## 2019-10-14 PROCEDURE — 71045 X-RAY EXAM CHEST 1 VIEW: CPT

## 2019-10-14 PROCEDURE — A9270 NON-COVERED ITEM OR SERVICE: HCPCS | Performed by: NURSE PRACTITIONER

## 2019-10-14 PROCEDURE — A9270 NON-COVERED ITEM OR SERVICE: HCPCS | Performed by: HOSPITALIST

## 2019-10-14 PROCEDURE — A9270 NON-COVERED ITEM OR SERVICE: HCPCS | Performed by: INTERNAL MEDICINE

## 2019-10-14 PROCEDURE — 700102 HCHG RX REV CODE 250 W/ 637 OVERRIDE(OP): Performed by: INTERNAL MEDICINE

## 2019-10-14 PROCEDURE — 700102 HCHG RX REV CODE 250 W/ 637 OVERRIDE(OP): Performed by: NURSE PRACTITIONER

## 2019-10-14 PROCEDURE — 700102 HCHG RX REV CODE 250 W/ 637 OVERRIDE(OP): Performed by: HOSPITALIST

## 2019-10-14 PROCEDURE — 700111 HCHG RX REV CODE 636 W/ 250 OVERRIDE (IP): Performed by: INTERNAL MEDICINE

## 2019-10-14 PROCEDURE — 74176 CT ABD & PELVIS W/O CONTRAST: CPT

## 2019-10-14 PROCEDURE — 80048 BASIC METABOLIC PNL TOTAL CA: CPT

## 2019-10-14 RX ORDER — METRONIDAZOLE 500 MG/1
500 TABLET ORAL EVERY 8 HOURS
Status: DISCONTINUED | OUTPATIENT
Start: 2019-10-14 | End: 2019-10-14 | Stop reason: HOSPADM

## 2019-10-14 RX ORDER — OMEPRAZOLE 20 MG/1
20 CAPSULE, DELAYED RELEASE ORAL EVERY 12 HOURS
Qty: 60 CAP | Refills: 2 | Status: SHIPPED
Start: 2019-10-14 | End: 2020-04-24

## 2019-10-14 RX ORDER — ALPRAZOLAM 0.25 MG/1
0.25 TABLET ORAL ONCE
Status: COMPLETED | OUTPATIENT
Start: 2019-10-14 | End: 2019-10-14

## 2019-10-14 RX ORDER — METRONIDAZOLE 500 MG/1
500 TABLET ORAL 3 TIMES DAILY
Qty: 21 TAB | Refills: 0 | Status: SHIPPED | OUTPATIENT
Start: 2019-10-14 | End: 2019-10-21

## 2019-10-14 RX ORDER — LEVOFLOXACIN 250 MG/1
750 TABLET, FILM COATED ORAL DAILY
Qty: 21 TAB | Refills: 0 | Status: SHIPPED | OUTPATIENT
Start: 2019-10-14 | End: 2019-10-21

## 2019-10-14 RX ORDER — LEVOFLOXACIN 500 MG/1
750 TABLET, FILM COATED ORAL EVERY 24 HOURS
Status: DISCONTINUED | OUTPATIENT
Start: 2019-10-14 | End: 2019-10-14 | Stop reason: HOSPADM

## 2019-10-14 RX ORDER — NICOTINE 21 MG/24HR
1 PATCH, TRANSDERMAL 24 HOURS TRANSDERMAL EVERY 24 HOURS
Qty: 30 PATCH | Refills: 2 | Status: SHIPPED | OUTPATIENT
Start: 2019-10-14 | End: 2020-01-21

## 2019-10-14 RX ADMIN — ALPRAZOLAM 0.25 MG: 0.25 TABLET ORAL at 10:36

## 2019-10-14 RX ADMIN — OMEPRAZOLE 20 MG: 20 CAPSULE, DELAYED RELEASE ORAL at 05:57

## 2019-10-14 RX ADMIN — OXYCODONE HYDROCHLORIDE 5 MG: 5 TABLET ORAL at 10:36

## 2019-10-14 RX ADMIN — GABAPENTIN 800 MG: 400 CAPSULE ORAL at 11:44

## 2019-10-14 RX ADMIN — OXYCODONE HYDROCHLORIDE 10 MG: 5 TABLET ORAL at 00:20

## 2019-10-14 RX ADMIN — OXYCODONE HYDROCHLORIDE 10 MG: 5 TABLET ORAL at 05:57

## 2019-10-14 RX ADMIN — CALCIUM POLYCARBOPHIL 625 MG: 625 TABLET, FILM COATED ORAL at 11:44

## 2019-10-14 RX ADMIN — GUAIFENESIN 600 MG: 600 TABLET, EXTENDED RELEASE ORAL at 05:57

## 2019-10-14 RX ADMIN — GABAPENTIN 800 MG: 400 CAPSULE ORAL at 05:57

## 2019-10-14 RX ADMIN — HEPARIN SODIUM 5000 UNITS: 5000 INJECTION, SOLUTION INTRAVENOUS; SUBCUTANEOUS at 05:57

## 2019-10-14 NOTE — DISCHARGE INSTRUCTIONS
Discharge Instructions    Discharged to home by car with relative. Discharged via wheelchair, hospital escort: Yes.  Special equipment needed: Not Applicable    Be sure to schedule a follow-up appointment with your primary care doctor or any specialists as instructed.     Discharge Plan:   Diet Plan: Discussed  Activity Level: Discussed  Smoking Cessation Offered: Patient Counseled  Confirmed Follow up Appointment: Patient to Call and Schedule Appointment  Confirmed Symptoms Management: Discussed  Medication Reconciliation Updated: Yes  Influenza Vaccine Indication: Indicated: 9 to 64 years of age  Influenza Vaccine Given - only chart on this line when given: Influenza Vaccine Given (See MAR)    I understand that a diet low in cholesterol, fat, and sodium is recommended for good health. Unless I have been given specific instructions below for another diet, I accept this instruction as my diet prescription.   Other diet: Dysphagia 3- soft/chopped    Special Instructions: None    · Is patient discharged on Warfarin / Coumadin?   No     Dysphagia Diet Level 3, Mechanically Advanced  The dysphagia level 3 diet includes foods that are soft, moist, and can be chopped into 1-inch chunks. This diet is helpful for people with mild swallowing difficulties. It reduces the risk of food getting caught in the windpipe, trachea, or lungs.  WHAT DO I NEED TO KNOW ABOUT THIS DIET?  · You may eat foods that are soft and moist.  · If you were on the dysphagia level 1 or level 2 diets, you may eat any of the foods included on those lists.  · Avoid foods that are dry, hard, sticky, chewy, coarse, and crunchy. Also avoid large cuts of food.  · Take small bites. Each bite should contain 1 inch or less of food.  · Thicken liquids if instructed by your health care provider. Follow your health care provider's instructions on how to do this and to what consistency.  · See your dietitian or speech language pathologist regularly for help with  your dietary changes.  WHAT FOODS CAN I EAT?  Grains  Moist breads without nuts or seeds. Biscuits, muffins, pancakes, and waffles well-moistened with syrup, jelly, margarine, or butter. Smooth cereals with plenty of milk to moisten them. Moist bread stuffing. Moist rice.  Vegetables  All cooked, soft vegetables. Shredded lettuce. Tender fried potatoes.  Fruits  All canned and cooked fruits. Soft, peeled fresh fruits, such as peaches, nectarines, kiwis, cantaloupe, honeydew melon, and watermelon without seeds. Soft berries, such as strawberries.  Meat and Other Protein Sources  Moist ground or finely diced or sliced meats. Solid, tender cuts of meat. Meatloaf. Hamburger with a bun. Sausage dora. Deli thin-sliced lunch meat. Chicken, egg, or tuna salad sandwich. Sloppy ni. Moist fish. Eggs prepared any way. Casseroles with small chunks of meats, ground meats, or tender meats.  Dairy  Cheese spreads without coarse large chunks. Shredded cheese. Cheese slices. Cottage cheese. Milk at the right texture. Smooth frappes. Yogurt without nuts or coconut. Ask your health care provider whether you can have frozen desserts (such as malts or milk shakes) and thin liquids.  Sweets/Desserts  Soft, smooth, moist desserts. Non-chewy, smooth candy. Jam. Jelly. Honey. Preserves. Ask your health care provider whether you can have frozen desserts.  Fats and Oils  Butter. Oils. Margarine. Mayonnaise. Gravy. Spreads.  Other  All seasonings and sweeteners. All sauces without large chunks.  The items listed above may not be a complete list of recommended foods or beverages. Contact your dietitian for more options.  WHAT FOODS ARE NOT RECOMMENDED?  Grains  Coarse or dry cereals. Dry breads. Toast. Crackers. Tough, crusty breads, such Indonesian bread and baguettes. Tough, crisp fried potatoes. Potato skins. Dry bread stuffing. Granola. Popcorn. Chips.  Vegetables  All raw vegetables except shredded lettuce. Cooked corn. Rubbery or stiff  cooked vegetables. Stringy vegetables, such as celery.  Fruits  Hard fruits that are difficult to chew, such as apples or pears. Stringy, high-pulp fruits, such as pineapple, papaya, or perry. Fruits with tough skins, such as grapes. Coconut. All dried fruits. Fruit leather. Fruit roll-ups. Fruit snacks.  Meat and Other Protein Sources  Dry or tough meats or poultry. Dry fish. Fish with bones. Peanut butter. All nuts and seeds.  Dairy   Any with nuts, seeds, chocolate chips, dried fruit, coconut, or pineapple.  Sweets/Desserts  Dry cakes. Chewy or dry cookies. Any with nuts, seeds, dry fruits, coconut, pineapple, or anything dry, sticky, or hard. Chewy caramel. Licorice. Taffy-type candies. Ask your health care provider whether you can have frozen desserts.  Fats and Oils  Any with chunks, nuts, seeds, or pineapple. Olives. Pickles.  Other  Soups with tough or large chunks of meats, poultry, or vegetables. Corn or clam chowder.  The items listed above may not be a complete list of foods and beverages to avoid. Contact your dietitian for more information.     This information is not intended to replace advice given to you by your health care provider. Make sure you discuss any questions you have with your health care provider.     Document Released: 12/18/2006 Document Revised: 01/08/2016 Document Reviewed: 12/01/2014  [employer] Interactive Patient Education ©2016 [employer]    Hypoxemia  Hypoxemia occurs when your blood does not contain enough oxygen. The body cannot work well when it does not have enough oxygen because every part of your body needs oxygen. Oxygen travels to all parts of the body through your blood. Hypoxemia can develop suddenly or can come on slowly.  CAUSES  Some common causes of hypoxemia include:  · Long-term (chronic) lung diseases, such as chronic obstructive pulmonary disease (COPD) or interstitial lung disease.   · Disorders that affect breathing at night, such as sleep apnea.  · Fluid  buildup in your lungs (pulmonary edema).   · Lung infection (pneumonia).  · Lung or throat cancer.  · Abnormal blood flow that bypasses the lungs (shunt).  · Certain diseases that affect nerves or muscles.  · A collapsed lung (pneumothorax).  · A blood clot in the lungs (pulmonary embolus).  · Certain types of heart disease.  · Slow or shallow breathing (hypoventilation).   · Certain medicines.  · High altitudes.  · Toxic chemicals and gases.  SIGNS AND SYMPTOMS  Not everyone who has hypoxemia will develop symptoms. If the hypoxemia developed quickly, you will likely have symptoms such as shortness of breath. If the hypoxemia came on slowly over months or years, you may not notice any symptoms. Symptoms can include:  · Shortness of breath (dyspnea).  · Bluish color of the skin, lips, or nail beds.  · Breathing that is fast, noisy, or shallow.  · A fast heartbeat.  · Feeling tired or sleepy.  · Being confused or feeling anxious.  DIAGNOSIS  To determine if you have hypoxemia, your health care provider may perform:  · A physical exam.  · Blood tests.  · A pulse oximetry. A sensor will be put on your finger, toe, or earlobe to measure the percent of oxygen in your blood.  TREATMENT  You will likely be treated with oxygen therapy. Depending on the cause of your hypoxemia, you may need oxygen for a short time (weeks or months), or you may need it indefinitely. Your health care provider may also recommend other therapies to treat the underlying cause of your hypoxemia.  HOME CARE INSTRUCTIONS  · Only take over-the-counter or prescription medicines as directed by your health care provider.  · Follow oxygen safety measures if you are on oxygen therapy. These may include:  ¨ Always having a backup supply of oxygen.  ¨ Not allowing anyone to smoke around oxygen.  ¨ Handling the oxygen tanks carefully and as instructed.  · If you smoke, quit. Stay away from people who smoke.  · Follow up with your health care provider as  directed.  SEEK MEDICAL CARE IF:  · You have any concerns about your oxygen therapy.  · You still have trouble breathing.  · You become short of breath when you exercise.  · You are tired when you wake up.  · You have a headache when you wake up.  SEEK IMMEDIATE MEDICAL CARE IF:   · Your breathing gets worse.  · You have new shortness of breath with normal activity.  · You have a bluish color of the skin, lips, or nail beds.  · You have confusion or cloudy thinking.  · You cough up dark mucus.  · You have chest pain.  · You have a fever.  MAKE SURE YOU:  · Understand these instructions.  · Will watch your condition.  · Will get help right away if you are not doing well or get worse.  This information is not intended to replace advice given to you by your health care provider. Make sure you discuss any questions you have with your health care provider.  Document Released: 07/02/2012 Document Revised: 12/23/2014 Document Reviewed: 07/17/2014  [employer] Interactive Patient Education © 2017 [employer].    Smoking Cessation, Tips for Success  If you are ready to quit smoking, congratulations! You have chosen to help yourself be healthier. Cigarettes bring nicotine, tar, carbon monoxide, and other irritants into your body. Your lungs, heart, and blood vessels will be able to work better without these poisons. There are many different ways to quit smoking. Nicotine gum, nicotine patches, a nicotine inhaler, or nicotine nasal spray can help with physical craving. Hypnosis, support groups, and medicines help break the habit of smoking.  WHAT THINGS CAN I DO TO MAKE QUITTING EASIER?   Here are some tips to help you quit for good:  · Pick a date when you will quit smoking completely. Tell all of your friends and family about your plan to quit on that date.  · Do not try to slowly cut down on the number of cigarettes you are smoking. Pick a quit date and quit smoking completely starting on that day.  · Throw away all  "cigarettes.    · Clean and remove all ashtrays from your home, work, and car.  · On a card, write down your reasons for quitting. Carry the card with you and read it when you get the urge to smoke.  · Cleanse your body of nicotine. Drink enough water and fluids to keep your urine clear or pale yellow. Do this after quitting to flush the nicotine from your body.  · Learn to predict your moods. Do not let a bad situation be your excuse to have a cigarette. Some situations in your life might tempt you into wanting a cigarette.  · Never have \"just one\" cigarette. It leads to wanting another and another. Remind yourself of your decision to quit.  · Change habits associated with smoking. If you smoked while driving or when feeling stressed, try other activities to replace smoking. Stand up when drinking your coffee. Brush your teeth after eating. Sit in a different chair when you read the paper. Avoid alcohol while trying to quit, and try to drink fewer caffeinated beverages. Alcohol and caffeine may urge you to smoke.  · Avoid foods and drinks that can trigger a desire to smoke, such as sugary or spicy foods and alcohol.  · Ask people who smoke not to smoke around you.  · Have something planned to do right after eating or having a cup of coffee. For example, plan to take a walk or exercise.  · Try a relaxation exercise to calm you down and decrease your stress. Remember, you may be tense and nervous for the first 2 weeks after you quit, but this will pass.  · Find new activities to keep your hands busy. Play with a pen, coin, or rubber band. Doodle or draw things on paper.  · Brush your teeth right after eating. This will help cut down on the craving for the taste of tobacco after meals. You can also try mouthwash.    · Use oral substitutes in place of cigarettes. Try using lemon drops, carrots, cinnamon sticks, or chewing gum. Keep them handy so they are available when you have the urge to smoke.  · When you have the " "urge to smoke, try deep breathing.  · Designate your home as a nonsmoking area.  · If you are a heavy smoker, ask your health care provider about a prescription for nicotine chewing gum. It can ease your withdrawal from nicotine.  · Reward yourself. Set aside the cigarette money you save and buy yourself something nice.  · Look for support from others. Join a support group or smoking cessation program. Ask someone at home or at work to help you with your plan to quit smoking.  · Always ask yourself, \"Do I need this cigarette or is this just a reflex?\" Tell yourself, \"Today, I choose not to smoke,\" or \"I do not want to smoke.\" You are reminding yourself of your decision to quit.  · Do not replace cigarette smoking with electronic cigarettes (commonly called e-cigarettes). The safety of e-cigarettes is unknown, and some may contain harmful chemicals.  · If you relapse, do not give up! Plan ahead and think about what you will do the next time you get the urge to smoke.  HOW WILL I FEEL WHEN I QUIT SMOKING?  You may have symptoms of withdrawal because your body is used to nicotine (the addictive substance in cigarettes). You may crave cigarettes, be irritable, feel very hungry, cough often, get headaches, or have difficulty concentrating. The withdrawal symptoms are only temporary. They are strongest when you first quit but will go away within 10-14 days. When withdrawal symptoms occur, stay in control. Think about your reasons for quitting. Remind yourself that these are signs that your body is healing and getting used to being without cigarettes. Remember that withdrawal symptoms are easier to treat than the major diseases that smoking can cause.   Even after the withdrawal is over, expect periodic urges to smoke. However, these cravings are generally short lived and will go away whether you smoke or not. Do not smoke!  WHAT RESOURCES ARE AVAILABLE TO HELP ME QUIT SMOKING?  Your health care provider can direct you to " community resources or hospitals for support, which may include:  · Group support.  · Education.  · Hypnosis.  · Therapy.     This information is not intended to replace advice given to you by your health care provider. Make sure you discuss any questions you have with your health care provider.     Document Released: 09/15/2005 Document Revised: 01/08/2016 Document Reviewed: 06/05/2014  [employer] Interactive Patient Education ©2016 [employer].    Depression / Suicide Risk    As you are discharged from this [facility] facility, it is important to learn how to keep safe from harming yourself.    Recognize the warning signs:  · Abrupt changes in personality, positive or negative- including increase in energy   · Giving away possessions  · Change in eating patterns- significant weight changes-  positive or negative  · Change in sleeping patterns- unable to sleep or sleeping all the time   · Unwillingness or inability to communicate  · Depression  · Unusual sadness, discouragement and loneliness  · Talk of wanting to die  · Neglect of personal appearance   · Rebelliousness- reckless behavior  · Withdrawal from people/activities they love  · Confusion- inability to concentrate     If you or a loved one observes any of these behaviors or has concerns about self-harm, here's what you can do:  · Talk about it- your feelings and reasons for harming yourself  · Remove any means that you might use to hurt yourself (examples: pills, rope, extension cords, firearm)  · Get professional help from the community (Mental Health, Substance Abuse, psychological counseling)  · Do not be alone:Call your Safe Contact- someone whom you trust who will be there for you.  · Call your local CRISIS HOTLINE 560-3286 or 928-386-6226  · Call your local Children's Mobile Crisis Response Team [place] (499) 022-2433 or www.[employer]  · Call the toll free National Suicide Prevention Hotlines   · National Suicide Prevention Lifeline  824-783-RNVC (9631)  · [facility] Line Network 800-SUICIDE (877-5123)    Take medications as instructed. Return to ER if abdominal pain, fevers, Nausea, vomiting or not feeling well.

## 2019-10-14 NOTE — DISCHARGE PLANNING
Anticipated Discharge Disposition: Home    Action: Jaylan with [facility] notified YESSICAW, the patient declined to have the medications delivered to her room prior to discharge.  Per Jaylan, the patient stated she preferred to  the medications after she discharged from the hospital.  Jaylan stated the Omeprazol and Levosloxacin requires prior authorization. GAVIN explained, the patient was discharged and had exited the Unit.    GAVIN spoke with Shea at the [facility].  Per Shea, the patient had not attempted to  the medication, Shea provided the phone number to initiate the prior authorization.      GAVIN with assistance from LEANNE Gramajo CM contacted the [facility] Prior Authorization Department [phone] and initiated the request for the authorization, Ref # [id number].    Barriers to Discharge: None.    Plan: Home.

## 2019-10-14 NOTE — DISCHARGE PLANNING
Met with patient at bedside. Patient prefers for her son to  prescriptions from [facility] pharmacy instead of receiving them at bedside. [facility] pharmacy notified. Contact information given to patient's son for additional medication questions/ concerns.     Marika Jerez, PharmD

## 2019-10-14 NOTE — PROGRESS NOTES
Patient very upset this morning.  States she is leaving today.  Unhappy with dietary restrictions and not being allowed to ambulate to bathroom independently.  Explained to patient rationale behind diet orders and the need for assistance with portable oxygen tank when ambulating.  Patient currently refusing to wear oxygen and states she is going downstairs to smoke and then going home.  Unable to appease patient's concerns.  Explained to patient we cannot allow going outside to smoke especially given her respiratory status.  States she is going anyway.  Pulse ox checked, O2 sats between 85-88% on room air.  Dr. Bangura notified, states he will come talk to patient.  Returned to inform patient the MD is coming to see her, patient not in room but all belongings are on bed.   Charge RN notified.  0830: patient agreeing to stay for home O2 evaluation.

## 2019-10-14 NOTE — PROGRESS NOTES
2 RN skin check complete   Discoloration and abrasion on right medial elbow. Mepilaex lite placed  Bruising noted on both arms.  Midline incision stapled CDI RLQ JG removal site covered in dressing.  Preventative mepilex placed on sacrum.  All bony prominences intact

## 2019-10-14 NOTE — PROGRESS NOTES
"  Trauma/Surgical Progress Note    Author: James Dumont Date & Time created: 10/14/2019   1:10 PM   10/4 repair posterior duodenal perforation  Extensive retroperitoneal contamination   Inflammation pancreas    Interval Events:  Mary Martinez is awake alert and appropriate  She report feeling improved  She staes pain control adequate  States she wants to go home       Hemodynamics:  /71   Pulse 100   Temp 37.2 °C (98.9 °F) (Temporal)   Resp 16   Ht 1.6 m (5' 3\")   Wt 50.8 kg (111 lb 15.9 oz)   SpO2 89%      Respiratory:    Respiration: 16, Pulse Oximetry: 89 %, O2 Daily Delivery Respiratory : OxyMask     Given By:: Mouthpiece, Work Of Breathing / Effort: Mild  RUL Breath Sounds: Diminished, RML Breath Sounds: Diminished, RLL Breath Sounds: Crackles, NIXON Breath Sounds: Clear, LLL Breath Sounds: Clear;Diminished  Fluids:    Intake/Output Summary (Last 24 hours) at 10/14/2019 1310  Last data filed at 10/14/2019 0400  Gross per 24 hour   Intake 480 ml   Output --   Net 480 ml     Admit Weight: 59 kg (130 lb)  Current      Physical Exam  Constitutional: She is oriented to person, place, and time. She appears well-developed. No distress.   HENT:   Head: Normocephalic.   Eyes: Conjunctivae are normal.   Neck: Neck supple. No JVD present.   Cardiovascular: Normal rate.   Pulmonary/Chest: Effort normal. No respiratory distress. She exhibits no tenderness.   Abdominal: She exhibits no distension. There is no tenderness There is no rebound and no guarding.   (+)bowel sounds  Midline incision approximated   Musculoskeletal: Normal range of motion.   Neurological: She is alert and oriented to person, place, and time.   Skin: Skin is warm and dry.   Nursing note and vitals reviewed.  Medical Decision Making/Problem List:    Active Hospital Problems    Diagnosis   • Acute hypoxemic respiratory failure (HCC) [J96.01]     Priority: High   • Duodenal perforation (HCC) [K63.1]     Priority: High   • Hoarseness " [R49.0]     Priority: Medium   • Anemia [D64.9]     Priority: Medium   • Leukocytosis [D72.829]     Priority: Medium   • Hypophosphatemia [E83.39]     Priority: Low   • Chronic pain [G89.29]     Priority: Low   • Hypokalemia [E87.6]     Priority: Low   • Rheumatoid arthritis (HCC) [M06.9]     Priority: Low     Diagnois update 10/1/2016     • Tobacco dependence [F17.200]     Priority: Low   • Septic shock (HCC) [A41.9, R65.21]     Core Measures & Quality Metrics:  Core Measures & Quality Metrics  NAPOLEON Score  Discussed patient condition with Hospitalist, Family, RN and Patient.    10/14/2019 11:07 AM    HISTORY/REASON FOR EXAM:  Abdominal abscess (Ped 0-18y); follow up retroperitonel fluid collection on 10/10..  Abdomen pain. Abscess. History of duodenal perforation    TECHNIQUE/EXAM DESCRIPTION:  CT scan of the abdomen and pelvis without contrast.    Noncontrast helical scanning was obtained from the diaphragmatic domes through the pubic symphysis.    Low dose optimization technique was utilized for this CT exam including automated exposure control and adjustment of the mA and/or kV according to patient size.    COMPARISON: 10/4/2019    FINDINGS:  CT Abdomen:  Right lower lobe atelectasis/possible pneumonia decreased since 10/10/2019. Trace right pleural fluid.  Atelectasis/possible pneumonitis bases of the lingula and left lower lobe.  2.6 mm nodule right middle lobe image 4.  Small pericardial fluid.    Liver and spleen are within normal limits in size.  Surgical absence gallbladder.  No peripancreatic fluid collections and no adrenal gland masses.  Right retroperitoneal/paraduodenal/right perinephric fluid collections. Previously demonstrated soft tissue gas is resolved.  Fat stranding within the right upper quadrant adjacent to the duodenum.    Punctate nonobstructing left renal stones. No hydronephrosis.      No evidence of bowel obstruction.    Postoperative changes ventral abdominal wall.  Subcutaneous fluid  collections adjacent to the incision site.  Mild calcified plaque aorta.  Subcentimeter short axis retroperitoneal lymph nodes.    Marked L4-5 degenerative disc disease.    CT Pelvis:  Urinary bladder is distended.  Uterus and adnexal structures are not well delineated.  No free fluid within the pelvis.   Impression       Interval resolving retroperitoneal soft tissue air. Small residual extraluminal air within the right upper quadrant.  Right upper quadrant retroperitoneal, paraduodenal, and perinephric fluid collections consistent with sequela of duodenal perforation. Infected fluid not excluded.  Postoperative changes ventral abdominal wall.  No evidence of bowel obstruction.  Punctate bilateral nonobstructing renal stones. No hydronephrosis.  Right lower lobe and to lesser extent lingular and left lower lobe atelectasis/possible pneumonitis/pneumonia.  Trace right basilar pleural effusion.  2.6 mm right middle lobe nodule.    Nodule less than 6 mm.  Low Risk: No routine follow-up    High Risk: Optional CT at 12 months    Comments: Nodules less than 6 mm do not require routine follow-up, but certain patients at high risk with suspicious nodule morphology, upper lobe location, or both may warrant 12-month follow-up.    Low Risk - Minimal or absent history of smoking and of other known risk factors.    High Risk - History of smoking or of other known risk factors.    Note: These recommendations do not apply to lung cancer screening, patients with immunosuppression, or patients with known primary cancer.    Fleischner Society 2017 Guidelines for Management of Incidentally Detected Pulmonary Nodules in Adults       Assessment/Plan  Duodenal perforation (HCC)- (present on admission)  Assessment & Plan  Soft tissue gas in the perinephric space of the right kidney tracking into the retroperitoneum posterior to the liver with small quantity of free fluid in the perinephric space  10/4  Laparotomy repair of duodenal  perforation, drain which peritoneal collection  10/7 DC NGT, JG drain and Prevena. Sips of clears.   10/8 Advance diet as tolerated. Continue PPI on discharge.  James Dumont MD. General Surgery      MS Mary Martinez is awake alert  and appropriate  Discussed with her findings CT scan   Discussed recommendation for drainage  She demonstrated understanding but declined  Discussed risks of not draining collection  to include but not limited to infection, sepsis, worse outcome  She demonstrated understanding but was adamant she would not accept

## 2019-10-14 NOTE — PROGRESS NOTES
Bedside report received.  Assessment complete.  A&O x 4. Patient calls appropriately.  Patient ambulatory with SB assist.   Patient has 7/10 pain. Medicated per MAR  Denies N&V. Tolerating D3 nectar thick diet.  Midline stapled healing KATHI CDI, RLQ JG sites covered in dressing CDI.  + void, + flatus, 10/13 BM.  Patient denies SOB.  Review plan with of care with patient. Call light and personal belongings with in reach. Hourly rounding in place. All needs met at this time.

## 2019-10-15 NOTE — DISCHARGE SUMMARY
Hospital Medicine Discharge Note     Admit Date:  10/4/2019       Discharge Date:   10/14/2019    Attending Physician:  Kavon Bangura M.D.      Diagnoses (includes active and resolved):     Principal Problem:    Acute hypoxemic respiratory failure (HCC) POA: Unknown  Active Problems:    Duodenal perforation (HCC) POA: Yes    Leukocytosis POA: Yes    Anemia POA: Yes    Hoarseness POA: Yes    Septic shock (HCC) POA: Unknown    Tobacco dependence POA: Yes    Rheumatoid arthritis (HCC) POA: Yes      Overview: Hardik update 10/1/2016    Hypokalemia POA: Yes    Hypophosphatemia POA: Unknown    Chronic pain POA: Unknown    Hyponatremia    Pulmonary nodule.   Resolved Problems:  Acute hypoxic respite failure  But no perforation  Septic shock  Hypokalemia  Hypophosphatemia    Hospital Summary (Brief Narrative):         49-year-old female Hx of  rheumatoid arthritis, GERD, osteoporosis, chronic pain, asthma/COPD, depression anxiety, tobacco dependence admitted with CT findings of abdominal abscess/free air and concern for duodenal perforation with acute kidney injury.  Patient had emergent exploratory laparotomy revealing duodenal perforation status post repair on 10/4/2019 and treated in ICU.  She was stabilized and transferred to medical floor.  She developed hypotension and acute respiratory failure with chest x-ray revealing right lower lobe infiltrate, edema..  Patient was started on IV lasix diuresis and Antibiotics.  CT scan with mucus plugging. No pulmonary embolism. She stabilized, transferred back to surgery floor . She was placed on RT protocols and eventually weaned off oxygen.  Patient tolerated diet. No abdominal pain. She was afebrile with WBC declining.   She was very anxious to go home and  threatened to leave AMA on morning of 10-14. Surgery had a follow up CT abdomen revealing fluid collection in right upper quadrant retroperitoneal, paraduodenal, and perinephric regions.  Despite this she was afebrile ,  without abdominal pain. Abdominal exam was benign.  She did not want Drain placement per Surgery Dr. Dumont recommendations.  She threatened to leave AMA but did not want to signs papers.  Finally after discussing with mother,  and  Son, felt it was ok for discharge with antibiotics with close follow up with Dr. Dumont, her surgeon.  Advised to Return to ER if fever, abdominal pain, Nausea or vomiting.  Findings of incidental pulmonary nodule also discussed . Given her increase lung cancer risk. Recommend for primary care provider follow up with repeat CT scan in approx 12 months to ensure stable.  On day of discharge, I examined patient , discussed findings, plan of care and follow up . She was discharged in stable condition.  She was given smoking cessation counseling.      Consultants:        Dr. Dumont, surgery   Lily Herrera, pulmonary     Imaging/ Testing:      CT-ABDOMEN-PELVIS W/O   Final Result      Interval resolving retroperitoneal soft tissue air. Small residual extraluminal air within the right upper quadrant.   Right upper quadrant retroperitoneal, paraduodenal, and perinephric fluid collections consistent with sequela of duodenal perforation. Infected fluid not excluded.   Postoperative changes ventral abdominal wall.   No evidence of bowel obstruction.   Punctate bilateral nonobstructing renal stones. No hydronephrosis.   Right lower lobe and to lesser extent lingular and left lower lobe atelectasis/possible pneumonitis/pneumonia.   Trace right basilar pleural effusion.   2.6 mm right middle lobe nodule.      Nodule less than 6 mm.   Low Risk: No routine follow-up      High Risk: Optional CT at 12 months      Comments: Nodules less than 6 mm do not require routine follow-up, but certain patients at high risk with suspicious nodule morphology, upper lobe location, or both may warrant 12-month follow-up.      Low Risk - Minimal or absent history of smoking and of other known risk factors.      High  Risk - History of smoking or of other known risk factors.      Note: These recommendations do not apply to lung cancer screening, patients with immunosuppression, or patients with known primary cancer.      Fleischner Society 2017 Guidelines for Management of Incidentally Detected Pulmonary Nodules in Adults            DX-CHEST-PORTABLE (1 VIEW)   Final Result         1.  Right basilar atelectasis versus infiltrate, stable      DX-CHEST-PORTABLE (1 VIEW)   Final Result         1. No significant interval change.      DX-CHEST-PORTABLE (1 VIEW)   Final Result         1. No significant interval change.      EC-ECHOCARDIOGRAM COMPLETE W/ CONT   Final Result      US-EXTREMITY VENOUS LOWER BILAT   Final Result      DX-CHEST-LIMITED (1 VIEW)   Final Result         1.  Pulmonary edema and/or infiltrates are identified, which appear somewhat increased since the prior exam.      CT-CTA CHEST PULMONARY ARTERY W/ RECONS   Final Result         1.  No pulmonary embolus appreciated.   2.  Mucous plugging of the right lower lobe bronchi with complete collapse and atelectasis in the right lower lobe. Consider bronchoscopy.   3.  Scattered hazy groundglass pulmonary opacities could represent atypical infiltrates or edema.   4.  Fluid in the retroperitoneum tracking adjacent to the right kidney, appearance suggests abscess. Given history of duodenal perforation, or residual leak cannot be excluded.   5.  Prominent bilateral hilar and mediastinal lymph nodes, workup and evaluation for causes of adenopathy as clinically appropriate.   6.  Right nephrolithiasis      DX-CHEST-PORTABLE (1 VIEW)   Final Result         1.  Hazy right infrahilar density suggests infiltrate.      DX-CHEST-PORTABLE (1 VIEW)   Final Result      Probable moderate RIGHT subpulmonic pleural effusion.  Superimposed pneumonia is not excluded.      CT-ABDOMEN-PELVIS WITH   Final Result   Addendum 1 of 1   Addendum: There is focus of air seen adjacent to the second  portion of the    duodenum, the duodenal wall appears slightly thickened. Consider duodenal    ulcer/perforation as etiology of these findings.      These findings were discussed with the patient's clinician, MARSHALL DEL VALLE,    on 10/4/2019 7:27 AM.      Final            Procedures:          DATE OF OPERATION: 10/4/2019     PREOPERATIVE DIAGNOSIS: Peritonitis, perforated viscus  POSTOPERATIVE DIAGNOSIS: Perforation posterior duodenum, pancreatitis, rectal peritoneal collection     PROCEDURE PERFORMED: Laparotomy repair of duodenal perforation, drain which peritoneal collection     SURGEON: James Dumont M.D.  Assistant Dr. Bolaños  ANESTHESIOLOGIST:   Anesthesiologist: Alejandro Ashford M.D.  ANESTHESIA: General endotracheal anesthesia.        INDICATIONS: The patient is a 49 y.o.-year-old female with peritonitis, findings of free air on CT scan of the abdomen she  is taken to the operating room for laparotomy.      FINDINGS: Perforation of the posterior duodenum, inflammation the pancreas, retroperitoneal collection    Discharge Medications:             Medication List      START taking these medications      Instructions   levoFLOXacin 250 MG Tabs  Commonly known as:  LEVAQUIN   Take 3 Tabs by mouth every day for 7 days.  Dose:  750 mg     metroNIDAZOLE 500 MG Tabs  Commonly known as:  FLAGYL   Take 1 Tab by mouth 3 times a day for 7 days.  Dose:  500 mg     nicotine 21 MG/24HR Pt24  Commonly known as:  NICODERM   Apply 1 Patch to skin as directed every 24 hours.  Dose:  1 Patch     omeprazole 20 MG delayed-release capsule  Commonly known as:  PRILOSEC   Take 1 Cap by mouth every 12 hours.  Dose:  20 mg        CONTINUE taking these medications      Instructions   albuterol 108 (90 Base) MCG/ACT Aers inhalation aerosol   Inhale 2 Puffs by mouth every four hours as needed for Shortness of Breath.  Dose:  2 Puff     alendronate 70 MG Tabs  Commonly known as:  FOSAMAX   Take 70 mg by mouth every Tuesday.  Dose:  70  mg     gabapentin 800 MG tablet  Commonly known as:  NEURONTIN   Take 1 Tab by mouth 4 times a day for 90 days.  Dose:  800 mg     oxyCODONE-acetaminophen 5-325 MG Tabs  Commonly known as:  PERCOCET   Take 1 Tab by mouth every 6 hours as needed for Severe Pain for up to 30 days.  Dose:  1 Tab     PARoxetine 30 MG Tabs  Commonly known as:  PAXIL   Take 60 mg by mouth every evening.  Dose:  60 mg     QUEtiapine 100 MG Tabs  Commonly known as:  SEROQUEL   Take 100 mg by mouth every evening.  Dose:  100 mg        STOP taking these medications    ENBREL 50 MG/ML Sosy  Generic drug:  Etanercept     meloxicam 15 MG tablet  Commonly known as:  MOBIC     pantoprazole 40 MG Tbec  Commonly known as:  PROTONIX     sulfamethoxazole-trimethoprim 800-160 MG tablet  Commonly known as:  BACTRIM DS               Diet:       DIET ORDERS (From admission to next 24h)    None            Activity:   As tolerated.      Code status:   Full code    Primary Care Provider:    Fidencio Christopher D.O.    Follow up appointment details :        Fidencio Christopher D.O.  [address] [facility] [address]  [address]  [place] [address]  [phone]    Schedule an appointment as soon as possible for a visit in 1 week      James Dumont M.D.  [address]  [place] [address]  [phone]    Schedule an appointment as soon as possible for a visit in 1 week      Future Appointments   Date Time Provider Department Center   10/22/2019  1:20 PM [facility]           Time spent on discharge day patient visit: 40 minutes    #################################################

## 2019-10-16 ENCOUNTER — TELEPHONE (OUTPATIENT)
Dept: PHYSICAL MEDICINE AND REHAB | Facility: MEDICAL CENTER | Age: 49
End: 2019-10-16

## 2019-10-16 LAB
BACTERIA BLD CULT: NORMAL
BACTERIA BLD CULT: NORMAL
SIGNIFICANT IND 70042: NORMAL
SIGNIFICANT IND 70042: NORMAL
SITE SITE: NORMAL
SITE SITE: NORMAL
SOURCE SOURCE: NORMAL
SOURCE SOURCE: NORMAL

## 2019-10-16 NOTE — TELEPHONE ENCOUNTER
Contacted patient and she stated she does not have transportation to be seen today. Can we add her to the schedule tomorrow and double book for 1pm?

## 2019-10-16 NOTE — TELEPHONE ENCOUNTER
Dr. Barton called with concern stating patient was in hospital recently due to ulcer and underwent surgery and now has  staples in abdomen. She currently is taking Percocet  1-2 tab every 6 hours and may need to increase. Dr. Horn is asking to make adjustments. Dr. Kerr is out of office and asked to speak with Dr. Gore. Call transferred to Dr. Gore.

## 2019-10-16 NOTE — TELEPHONE ENCOUNTER
I discussed the case with the patient's PCP Fidencio Christopehr DO.  The patient was having increased pain after surgery and recent discharge.  She has an appointment with Dr. holcomb upcoming for 10/22/2019 but needs pain medication in the interim.  I discussed with my staff to reach out to the patient to offer an appointment today for pain management to carry the patient over until she can be seen by her primary pain physician Dr. Holcomb.

## 2019-10-17 ENCOUNTER — OFFICE VISIT (OUTPATIENT)
Dept: PHYSICAL MEDICINE AND REHAB | Facility: MEDICAL CENTER | Age: 49
End: 2019-10-17
Payer: MEDICAID

## 2019-10-17 VITALS
DIASTOLIC BLOOD PRESSURE: 58 MMHG | WEIGHT: 118.83 LBS | TEMPERATURE: 97.7 F | HEIGHT: 63 IN | HEART RATE: 99 BPM | SYSTOLIC BLOOD PRESSURE: 92 MMHG | BODY MASS INDEX: 21.05 KG/M2 | OXYGEN SATURATION: 95 %

## 2019-10-17 DIAGNOSIS — Z09 STATUS POST ABDOMINAL SURGERY, FOLLOW-UP EXAM: ICD-10-CM

## 2019-10-17 DIAGNOSIS — M05.79 RHEUMATOID ARTHRITIS INVOLVING MULTIPLE SITES WITH POSITIVE RHEUMATOID FACTOR (HCC): ICD-10-CM

## 2019-10-17 DIAGNOSIS — K63.1 DUODENAL PERFORATION (HCC): ICD-10-CM

## 2019-10-17 DIAGNOSIS — M25.562 PAIN IN BOTH KNEES, UNSPECIFIED CHRONICITY: ICD-10-CM

## 2019-10-17 DIAGNOSIS — F17.200 TOBACCO DEPENDENCE: ICD-10-CM

## 2019-10-17 DIAGNOSIS — M25.561 PAIN IN BOTH KNEES, UNSPECIFIED CHRONICITY: ICD-10-CM

## 2019-10-17 DIAGNOSIS — F11.90 CHRONIC, CONTINUOUS USE OF OPIOIDS: ICD-10-CM

## 2019-10-17 DIAGNOSIS — M19.019 SHOULDER ARTHRITIS: ICD-10-CM

## 2019-10-17 PROCEDURE — 99215 OFFICE O/P EST HI 40 MIN: CPT | Performed by: PHYSICAL MEDICINE & REHABILITATION

## 2019-10-17 RX ORDER — OXYCODONE HYDROCHLORIDE 5 MG/1
5-10 TABLET ORAL EVERY 6 HOURS PRN
Qty: 49 TAB | Refills: 0 | Status: SHIPPED | OUTPATIENT
Start: 2019-10-17 | End: 2019-10-22

## 2019-10-17 ASSESSMENT — PATIENT HEALTH QUESTIONNAIRE - PHQ9
7. TROUBLE CONCENTRATING ON THINGS, SUCH AS READING THE NEWSPAPER OR WATCHING TELEVISION: 2
8. MOVING OR SPEAKING SO SLOWLY THAT OTHER PEOPLE COULD HAVE NOTICED. OR THE OPPOSITE, BEING SO FIGETY OR RESTLESS THAT YOU HAVE BEEN MOVING AROUND A LOT MORE THAN USUAL: 1
SUM OF ALL RESPONSES TO PHQ QUESTIONS 1-9: 15
4. FEELING TIRED OR HAVING LITTLE ENERGY: 2
SUM OF ALL RESPONSES TO PHQ9 QUESTIONS 1 AND 2: 3
2. FEELING DOWN, DEPRESSED, IRRITABLE, OR HOPELESS: 2
6. FEELING BAD ABOUT YOURSELF - OR THAT YOU ARE A FAILURE OR HAVE LET YOURSELF OR YOUR FAMILY DOWN: 2
3. TROUBLE FALLING OR STAYING ASLEEP OR SLEEPING TOO MUCH: 2
1. LITTLE INTEREST OR PLEASURE IN DOING THINGS: 1
5. POOR APPETITE OR OVEREATING: 2
9. THOUGHTS THAT YOU WOULD BE BETTER OFF DEAD, OR OF HURTING YOURSELF: 1

## 2019-10-17 ASSESSMENT — PAIN SCALES - GENERAL: PAINLEVEL: 5=MODERATE PAIN

## 2019-10-17 NOTE — PATIENT INSTRUCTIONS
I advise quitting smoking with multiple health benefits for you have those around you. Please call [phone] or  visit our website at [email or web address] to see if you are a candidate for the QUIT tobacco program at [facility]. .

## 2019-10-22 ENCOUNTER — OFFICE VISIT (OUTPATIENT)
Dept: PHYSICAL MEDICINE AND REHAB | Facility: MEDICAL CENTER | Age: 49
End: 2019-10-22
Payer: MEDICAID

## 2019-10-22 VITALS
HEART RATE: 98 BPM | DIASTOLIC BLOOD PRESSURE: 62 MMHG | HEIGHT: 63 IN | TEMPERATURE: 98.1 F | OXYGEN SATURATION: 95 % | BODY MASS INDEX: 21.02 KG/M2 | WEIGHT: 118.61 LBS | SYSTOLIC BLOOD PRESSURE: 110 MMHG

## 2019-10-22 DIAGNOSIS — R20.2 PARESTHESIAS: ICD-10-CM

## 2019-10-22 DIAGNOSIS — F17.200 NICOTINE DEPENDENCE WITH CURRENT USE: ICD-10-CM

## 2019-10-22 DIAGNOSIS — M05.79 RHEUMATOID ARTHRITIS INVOLVING MULTIPLE SITES WITH POSITIVE RHEUMATOID FACTOR (HCC): ICD-10-CM

## 2019-10-22 DIAGNOSIS — Z09 STATUS POST ABDOMINAL SURGERY, FOLLOW-UP EXAM: ICD-10-CM

## 2019-10-22 DIAGNOSIS — F32.A DEPRESSION, UNSPECIFIED DEPRESSION TYPE: ICD-10-CM

## 2019-10-22 DIAGNOSIS — S68.411S AMPUTATION OF RIGHT HAND, SEQUELA (HCC): ICD-10-CM

## 2019-10-22 DIAGNOSIS — M53.2X1 ATLANTOAXIAL INSTABILITY: ICD-10-CM

## 2019-10-22 DIAGNOSIS — F11.90 CHRONIC, CONTINUOUS USE OF OPIOIDS: ICD-10-CM

## 2019-10-22 PROCEDURE — 99214 OFFICE O/P EST MOD 30 MIN: CPT | Mod: 25 | Performed by: PHYSICAL MEDICINE & REHABILITATION

## 2019-10-22 PROCEDURE — 99406 BEHAV CHNG SMOKING 3-10 MIN: CPT | Performed by: PHYSICAL MEDICINE & REHABILITATION

## 2019-10-22 RX ORDER — OXYCODONE HYDROCHLORIDE AND ACETAMINOPHEN 5; 325 MG/1; MG/1
1 TABLET ORAL EVERY 6 HOURS PRN
Qty: 120 TAB | Refills: 0 | Status: SHIPPED | OUTPATIENT
Start: 2019-10-24 | End: 2019-11-22 | Stop reason: SDUPTHER

## 2019-10-22 RX ORDER — GABAPENTIN 800 MG/1
800 TABLET ORAL 4 TIMES DAILY
Qty: 360 TAB | Refills: 0 | Status: SHIPPED | OUTPATIENT
Start: 2019-10-22 | End: 2020-01-20

## 2019-10-22 ASSESSMENT — PAIN SCALES - GENERAL: PAINLEVEL: 4=SLIGHT-MODERATE PAIN

## 2019-10-22 ASSESSMENT — PATIENT HEALTH QUESTIONNAIRE - PHQ9
CLINICAL INTERPRETATION OF PHQ2 SCORE: 2
5. POOR APPETITE OR OVEREATING: 3 - NEARLY EVERY DAY
SUM OF ALL RESPONSES TO PHQ QUESTIONS 1-9: 13

## 2019-10-22 NOTE — PROGRESS NOTES
Follow up patient note  Pain Medicine, Interventional spine and sports physiatry, Physical medicine rehabilitation      Chief complaint:   Joint pain      HISTORY    Please see new patient note dated 06/08/2018 by Dr Kerr,  for more details.     HPI  Patient identification: Mary Martinez 49 y.o. female returns for follow-up of her pain related to rheumatoid arthritis.      Interval history:   Mary presents for follow-up of her chronic pain complaints related to rheumatoid arthritis.     She has been having more trouble with her wrists.  They continue to swell and ache.  Her shoulders and neck have been bothering her more.  She continues to see Dr. Dela Cruz for Rheumatology.  Her ortho hand consultation is upcoming.  No recent drainage from residual digit on the right hand.    Given the limitations in her shoulders, she continues to have some trouble with washing her hair.    Her low back pain and lower abdominal pain has been worse lately, but this seems to be manageable with her current medications. Pain is 4/10 on the VAS.  It is not limiting her activities.  She was hospitalized for duodenal perforation and respiratory failure 10/4/2019-10/14/2019.  Her abdominal pain is starting to lessen.    Percocet 5/325 q6h has been stable, previously, and helps keep symptoms manageable.  She has regular bowel movements without use of OTC medications.  Immediately after discharge, she was given oxycodone for postsurgical pain.  She is now taking oxycodone 5mg q6h without difficulty.    She is working on smoking cessation, but this has been difficult.  She is smoking less since discharge from the hospital and is going to keep working on smoking cessation.  Currently, she is smoking only about 3 cigarettes and is using the nicotine patch.  Stress management has made this difficult.  She continues to work with psychiatry for her depression, which has been improving.    Gabapentin is well-tolerated and does seem to  help      PHQ-9: 27, down to 13  ORT: 4    ROS  Unchanged from previous visit 10/17/2019 and 09/12/2019 except as noted in the HPI  Red Flags :   Fever, Chills, Sweats: Denies  Involuntary Weight Loss: Denies  Bowel/Bladder Incontinence: Denies      PMHx:   Past Medical History:   Diagnosis Date   • ASTHMA     inhalers prn   • Dental disorder     upper partial   • Pain     everywhere   • Psychiatric problem     anxiety   • Rheumatoid arthritis(714.0) 2003       PSHx:   Past Surgical History:   Procedure Laterality Date   • PB EXPLORATORY OF ABDOMEN N/A 10/4/2019    Procedure: LAPAROTOMY, EXPLORATORY AND REPAIR OF DUODENAL PERFORATION;  Surgeon: James Dumont M.D.;  Location: SURGERY [facility];  Service: General   • FINGER ARTHROPLASTY Right 6/5/2017    Procedure: FINGER ARTHROPLASTY - LONG, RING AND SMALL VOLAR PLATE;  Surgeon: Lobo Rosen M.D.;  Location: SURGERY SAME DAY [facility];  Service:    • FINGER AMPUTATION  6/5/2017    Procedure: FINGER AMPUTATION - LONG, RING AND SMALL AT THE PROXIMAL INTERPHALANGEAL JOINT;  Surgeon: Lobo Rosen M.D.;  Location: SURGERY SAME DAY [facility];  Service:    • FINGER ARTHROPLASTY Right 4/17/2017    Procedure: FINGER ARTHROPLASTY ;  Surgeon: Lobo Rosen M.D.;  Location: SURGERY SAME DAY [facility];  Service:    • FINGER AMPUTATION Right 9/14/2016    Procedure: FINGER AMPUTATION INDEX;  Surgeon: Lobo Rosen M.D.;  Location: SURGERY SAME DAY [facility];  Service:    • IRRIGATION & DEBRIDEMENT ORTHO Right 9/11/2016    Procedure: IRRIGATION & DEBRIDEMENT ORTHO - right index finger;  Surgeon: Madhu Rosen M.D.;  Location: [facility];  Service:    • PIP ARTHRODESIS Right 8/29/2016    Procedure: RE-DO INDEX FINGER PROXIMAL INTERPHALANGEAL ARTHRODESIS;  Surgeon: Lobo Rosen M.D.;  Location: SURGERY SAME DAY [facility];  Service:    • BONE GRAFT Right 8/29/2016    Procedure: BONE GRAFT - DISTAL  RADIUS ;  Surgeon: Lobo Rosen M.D.;  Location: SURGERY SAME DAY [facility];  Service:    • ARTHRODESIS Right 2016    Procedure: ARTHRODESIS INDEX FINGER PROXIMAL INTERPHALANGEAL;  Surgeon: Lobo Rosen M.D.;  Location: SURGERY SAME DAY [facility];  Service:    • FOOT RECONSTRUCTION RHEUMATIC Left 2015    Procedure: FOOT RECONSTRUCTION RHEUMATIC;  Surgeon: Heriberto Alves M.D.;  Location: SURGERY [facility];  Service:    • TOE FUSION Right 2015    Procedure: TOE FUSION 1ST METATARSALPHALANGEAL JOINT;  Surgeon: Heriberto Alves M.D.;  Location: SURGERY [facility];  Service:    • BONE SPUR EXCISION  2015    Procedure: BONE SPUR EXCISION METATARSAL HEAD 2-3;  Surgeon: Heriberto Alves M.D.;  Location: SURGERY [facility];  Service:    • HAMMERTOE CORRECTION  2015    Procedure: HAMMERTOE CORRECTION AND SOFT TISSUE RE-ALINGMENT 2-3 ;  Surgeon: Heriberto Alves M.D.;  Location: SURGERY [facility];  Service:    • EMANUEL BY LAPAROSCOPY  2011    Performed by VERO WRIGHT at [facility]   • ABDOMINAL ABSCESS DRAINAGE  2011    Performed by VERO WRIGHT at [facility]   • OTHER  1982    tonsils   • APPENDECTOMY     • CHOLECYSTECTOMY     • GYN SURGERY      C section X 4   • OTHER ABDOMINAL SURGERY      small colon block   • PB  DELIVERY ONLY      x 4   • TONSILLECTOMY     • WRIST ORIF         Family history   History reviewed. No pertinent family history.      Medications:   Outpatient Medications Marked as Taking for the 10/22/19 encounter (Office Visit) with Jayy Kerr M.D.   Medication Sig Dispense Refill   • gabapentin (NEURONTIN) 800 MG tablet Take 1 Tab by mouth 4 times a day for 90 days. 360 Tab 0   • oxyCODONE-acetaminophen (PERCOCET) 5-325 MG Tab Take 1 Tab by mouth every 6 hours as needed for Severe Pain for up to 30 days. 120 Tab 0   • nicotine (NICODERM) 21 MG/24HR PATCH 24 HR Apply 1 Patch to skin  as directed every 24 hours. 30 Patch 2   • omeprazole (PRILOSEC) 20 MG delayed-release capsule Take 1 Cap by mouth every 12 hours. 60 Cap 2   • PARoxetine (PAXIL) 30 MG Tab Take 60 mg by mouth every evening.     • albuterol (VENTOLIN OR PROVENTIL) 108 (90 BASE) MCG/ACT AERS inhalation aerosol Inhale 2 Puffs by mouth every four hours as needed for Shortness of Breath.         Allergies:   Allergies   Allergen Reactions   • Penicillins Anaphylaxis and Hives     Tolerates cephalosporins; reports throat swelling with PCN   • Nitrous Oxide Vomiting   • Aripiprazole [Abilify] Nausea     Spasms, shaking       Social Hx:   Social History     Socioeconomic History   • Marital status:      Spouse name: Not on file   • Number of children: Not on file   • Years of education: Not on file   • Highest education level: Not on file   Occupational History   • Not on file   Social Needs   • Financial resource strain: Not on file   • Food insecurity:     Worry: Not on file     Inability: Not on file   • Transportation needs:     Medical: Not on file     Non-medical: Not on file   Tobacco Use   • Smoking status: Current Every Day Smoker     Packs/day: 1.00     Years: 30.00     Pack years: 30.00     Types: Cigarettes   • Smokeless tobacco: Never Used   • Tobacco comment: 1 ppd   Substance and Sexual Activity   • Alcohol use: No   • Drug use: No   • Sexual activity: Not on file   Lifestyle   • Physical activity:     Days per week: Not on file     Minutes per session: Not on file   • Stress: Not on file   Relationships   • Social connections:     Talks on phone: Not on file     Gets together: Not on file     Attends Worship service: Not on file     Active member of club or organization: Not on file     Attends meetings of clubs or organizations: Not on file     Relationship status: Not on file   • Intimate partner violence:     Fear of current or ex partner: Not on file     Emotionally abused: Not on file     Physically abused:  "Not on file     Forced sexual activity: Not on file   Other Topics Concern   •  Service No   • Blood Transfusions Yes   • Caffeine Concern No   • Occupational Exposure No   • Hobby Hazards No   • Sleep Concern No   • Stress Concern Yes   • Weight Concern No   • Special Diet No   • Back Care No   • Exercise Yes   • Bike Helmet Yes   • Seat Belt Yes   • Self-Exams Yes   Social History Narrative    ** Merged History Encounter **              EXAMINATION     Physical Exam:   Vitals: /62 (BP Location: Left arm, Patient Position: Sitting, BP Cuff Size: Small adult)   Pulse 98   Temp 36.7 °C (98.1 °F) (Temporal)   Ht 1.6 m (5' 3\")   Wt 53.8 kg (118 lb 9.7 oz)   SpO2 95%     Constitutional:   Body Habitus: Body mass index is 21.01 kg/m².  Cooperation: Fully cooperates with exam  Appearance: Well-groomed no disheveled, in no acute distress  Respiratory-  breathing comfortable on room air, no audible wheezing  Cardiovascular-  No lower extremity edema is noted.   Psychiatric- alert and oriented ×3. Normal affect.   Abdominal No significant erythema at surgical incision.  No drainage.  Staples intact  Musculoskeletal:     Reflexes 2+ biceps, triceps bilaterally    Partial hand amputation on the right at the MCPs; ulnar deviation on the left with MCP subluxation, mild atrophy of the hand intrinsics with wasting of the thenar eminence.  Minimal edema in the right wrist.    Right fourth digit amputation without erythema or warmth.  No signs of drainage    Mild tenderness over the left wrist, no warmth or erythema.  Mild edema of the left wrist.  Negative compression test.      MEDICAL DECISION MAKING    DATA    Labs: UDS 10/30/2018 consistent with medications.  Oxycodone and metabolites without other substances   UDS 04/19/2019 consistent with medications. Oxycodone and metabolites without other substances   UDS 07/19/2019 consistent with medications.  Oxycodone and metabolites without other substances "     Imaging:  No new imaging  MRI cervical spine 07/31/2018:  Films reviewed.  These are my reads:  There is is note of motion at the atlantodental level with 5mm atlantodental inverval in flexion that reduces to 1-2 mm in extension.  Mild retrolisthesis of C4 on C5 and anterolisthesis of C5- on C6.  Disc extrusion at C6-7 with mild central canal stenosis    Xray left shoulder 2/5/2018 Humeral head is elevated.  Glenohumeral joint arthritis.    Reports reviewed:  Xray cervical spine 11/14/2018  1. No acute fracture  2. Persistent motion at the C1-2 joint as before, suggesting atlantoaxial instability  3. Minimal instability noted at C5-6 level as described.    MRI cervical spine 07/31/2018  1. Widening of the atlantodental interal in neutral and flexion positions with a 5mm space which reduces to 1-2 mm in extension  2. 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 Rheumatoid arthritis involving multiple sites with positive rheumatoid factor ([facility]) M05.79   2. Chronic, continuous use of opioids F11.90   3. Status post abdominal surgery, follow-up exam Z09   4. Depression, unspecified depression type F32.9   5. 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 Amputation of right hand, sequela ([facility]) S68.411S   7. Paresthesias R20.2   8. Nicotine dependence with current use F17.200         ASSESSMENT and PLAN:     Mary Meera Martinez 49 y.o. female with rheumatoid arthritis    Mary was seen today for follow-up.    Orders and management for this visit:    Continue gabapentin 800mg po QID.     Continue follow-up with Psychology and Psychiatry.  Denies suicidality.    Continue plans for consultation with orthopedics    Her neck symptoms are stable, currently.  She is not anxious for surgery and we  will continue with conservative care for now.    Continue plans for follow-up with surgeon, Dr. Dumont for postsurgical care    Refill percocet given.  She has returned to her baseline dose of oxycodone.  UDS compliant 07/19/2019.      Continue to follow-up with Dr. Dela Cruz, Rheumatology for management of other medications related to rheumatoid arthritis.    Encouraged her to continue with efforts to quit smoking.  We talked about distraction techniques, use of nicotine patch and smoking cessation, reminded of resources.  Time spent 3 minutes.    In prescribing controlled substances to this patient, I certify that I have obtained and reviewed the medical history of Mary Martinez. I have also made a good aristides effort to obtain applicable records from other providers who have treated the patient and records did not demonstrate any increased risk of substance abuse that would prevent me from prescribing controlled substances.     I have conducted a physical exam and documented it. I have reviewed Ms. Martinez’s prescription history as maintained by the [place] Prescription Monitoring Program.   ORT 4, indicates moderate risk    I have assessed the patient’s risk for abuse, dependency, and addiction using the validated Opioid Risk Tool available at https://www.mdcalc.com/hlkhgs-jesg-gmtj-ort-narcotic-abuse.     Given the above, I believe the benefits of controlled substance therapy outweigh the risks. The reasons for prescribing controlled substances include non-narcotic, oral analgesic alternatives have been inadequate for pain control and in my professional opinion, controlled substances are the only reasonable choice for this patient because her pain was note adequately controlled with alternatives and she has chronic progressive disease. Accordingly, I have discussed the risk and benefits, treatment plan, and alternative therapies with the patient.         Follow up: 4 weeks      Please note that this dictation  was created using voice recognition software. I have made every reasonable attempt to correct obvious errors but there may be errors of grammar and content that I may have overlooked prior to finalization of this note.      Jayy Kerr MD  Interventional Spine and Sports Physiatry  Physical Medicine and Rehabilitation  [facility]

## 2019-11-22 ENCOUNTER — OFFICE VISIT (OUTPATIENT)
Dept: PHYSICAL MEDICINE AND REHAB | Facility: MEDICAL CENTER | Age: 49
End: 2019-11-22
Payer: MEDICAID

## 2019-11-22 VITALS
WEIGHT: 120.15 LBS | HEART RATE: 99 BPM | SYSTOLIC BLOOD PRESSURE: 124 MMHG | DIASTOLIC BLOOD PRESSURE: 82 MMHG | TEMPERATURE: 98.7 F | OXYGEN SATURATION: 97 % | BODY MASS INDEX: 21.29 KG/M2 | HEIGHT: 63 IN

## 2019-11-22 DIAGNOSIS — M79.2 NEUROPATHIC PAIN: ICD-10-CM

## 2019-11-22 DIAGNOSIS — M53.2X1 ATLANTOAXIAL INSTABILITY: ICD-10-CM

## 2019-11-22 DIAGNOSIS — F11.90 CHRONIC, CONTINUOUS USE OF OPIOIDS: ICD-10-CM

## 2019-11-22 DIAGNOSIS — M05.79 RHEUMATOID ARTHRITIS INVOLVING MULTIPLE SITES WITH POSITIVE RHEUMATOID FACTOR (HCC): ICD-10-CM

## 2019-11-22 DIAGNOSIS — F32.A DEPRESSION, UNSPECIFIED DEPRESSION TYPE: ICD-10-CM

## 2019-11-22 PROCEDURE — 99214 OFFICE O/P EST MOD 30 MIN: CPT | Performed by: PHYSICAL MEDICINE & REHABILITATION

## 2019-11-22 RX ORDER — OXYCODONE HYDROCHLORIDE AND ACETAMINOPHEN 5; 325 MG/1; MG/1
1 TABLET ORAL EVERY 6 HOURS PRN
Qty: 120 TAB | Refills: 0 | Status: SHIPPED | OUTPATIENT
Start: 2019-11-23 | End: 2019-12-23

## 2019-11-22 RX ORDER — OXYCODONE HYDROCHLORIDE AND ACETAMINOPHEN 5; 325 MG/1; MG/1
1-2 TABLET ORAL EVERY 6 HOURS PRN
Qty: 120 TAB | Refills: 0 | Status: SHIPPED | OUTPATIENT
Start: 2019-12-23 | End: 2020-01-21

## 2019-11-22 ASSESSMENT — PATIENT HEALTH QUESTIONNAIRE - PHQ9
SUM OF ALL RESPONSES TO PHQ QUESTIONS 1-9: 12
CLINICAL INTERPRETATION OF PHQ2 SCORE: 2
5. POOR APPETITE OR OVEREATING: 3 - NEARLY EVERY DAY

## 2019-11-22 ASSESSMENT — PAIN SCALES - GENERAL: PAINLEVEL: 4=SLIGHT-MODERATE PAIN

## 2019-11-22 NOTE — PROGRESS NOTES
Follow up patient note  Pain Medicine, Interventional spine and sports physiatry, Physical medicine rehabilitation      Chief complaint:   Joint pain      HISTORY    Please see new patient note dated 06/08/2018 by Dr Kerr,  for more details.     HPI  Patient identification: Mary Martinez 49 y.o. female returns for follow-up of her pain related to rheumatoid arthritis.      Interval history:   Mary presents for follow-up of her chronic pain complaints related to rheumatoid arthritis.     Overall, her pain is the same as last visit on 10/22/2019.  She has not seen ortho hand surgeon.  This is on hold for now.  She has not had any recent drainage from the residual digit on the right hand.    She has been having trouble washing her hair due to shoulder pain.  Otherwise, she has managed her ADLs.    Pain is most significant in the shoulders and neck. She is avoiding movements that cause cracking in her neck.      She has follow-up for abdominal pain upcoming.  This is lessening.  She was hospitalized for duodenal perforation and respiratory failure 10/4/2019-10/14/2019.  Her abdominal pain is starting to lessen.    Percocet 5/325 q6h has been stable, previously, and helps keep symptoms manageable.  She has regular bowel movements without use of OTC medications.      She is working on smoking cessation, but this has been difficult.  Her hope is that she might consider progressing with this after the holidays.  She is not using the nicotine patch currently.     Gabapentin is well-tolerated and does seem to help    She continues to follow-up with Psychiatry.        PHQ-9: 27, down to 12  ORT: 4    ROS  Unchanged from previous visit 10/22/2019 except as noted in the HPI  Red Flags :   Fever, Chills, Sweats: Denies  Involuntary Weight Loss: Denies  Bowel/Bladder Incontinence: Denies      PMHx:   Past Medical History:   Diagnosis Date   • ASTHMA     inhalers prn   • Dental disorder     upper partial   • Pain      everywhere   • Psychiatric problem     anxiety   • Rheumatoid arthritis(714.0) 2003       PSHx:   Past Surgical History:   Procedure Laterality Date   • PB EXPLORATORY OF ABDOMEN N/A 10/4/2019    Procedure: LAPAROTOMY, EXPLORATORY AND REPAIR OF DUODENAL PERFORATION;  Surgeon: James Dumont M.D.;  Location: [facility];  Service: General   • FINGER ARTHROPLASTY Right 6/5/2017    Procedure: FINGER ARTHROPLASTY - LONG, RING AND SMALL VOLAR PLATE;  Surgeon: Lobo Rosen M.D.;  Location: SURGERY SAME DAY [facility];  Service:    • FINGER AMPUTATION  6/5/2017    Procedure: FINGER AMPUTATION - LONG, RING AND SMALL AT THE PROXIMAL INTERPHALANGEAL JOINT;  Surgeon: Lobo Rosen M.D.;  Location: SURGERY SAME DAY [facility];  Service:    • FINGER ARTHROPLASTY Right 4/17/2017    Procedure: FINGER ARTHROPLASTY ;  Surgeon: Lobo Rosen M.D.;  Location: SURGERY SAME DAY [facility];  Service:    • FINGER AMPUTATION Right 9/14/2016    Procedure: FINGER AMPUTATION INDEX;  Surgeon: Lobo Rosen M.D.;  Location: SURGERY SAME DAY [facility];  Service:    • IRRIGATION & DEBRIDEMENT ORTHO Right 9/11/2016    Procedure: IRRIGATION & DEBRIDEMENT ORTHO - right index finger;  Surgeon: Madhu Rosen M.D.;  Location: [facility];  Service:    • PIP ARTHRODESIS Right 8/29/2016    Procedure: RE-DO INDEX FINGER PROXIMAL INTERPHALANGEAL ARTHRODESIS;  Surgeon: Lobo Rosen M.D.;  Location: SURGERY SAME DAY [facility];  Service:    • BONE GRAFT Right 8/29/2016    Procedure: BONE GRAFT - DISTAL RADIUS ;  Surgeon: Lobo Rosen M.D.;  Location: SURGERY SAME DAY [facility];  Service:    • ARTHRODESIS Right 5/6/2016    Procedure: ARTHRODESIS INDEX FINGER PROXIMAL INTERPHALANGEAL;  Surgeon: Lobo Rosen M.D.;  Location: SURGERY SAME DAY [facility];  Service:    • FOOT RECONSTRUCTION RHEUMATIC Left 7/29/2015    Procedure: FOOT RECONSTRUCTION RHEUMATIC;   Surgeon: Heriberto Alves M.D.;  Location: SURGERY [facility];  Service:    • TOE FUSION Right 2015    Procedure: TOE FUSION 1ST METATARSALPHALANGEAL JOINT;  Surgeon: Heriberto Alves M.D.;  Location: SURGERY [facility];  Service:    • BONE SPUR EXCISION  2015    Procedure: BONE SPUR EXCISION METATARSAL HEAD 2-3;  Surgeon: Heriberto Alves M.D.;  Location: SURGERY [facility];  Service:    • HAMMERTOE CORRECTION  2015    Procedure: HAMMERTOE CORRECTION AND SOFT TISSUE RE-ALINGMENT 2-3 ;  Surgeon: Heriberto Alves M.D.;  Location: SURGERY [facility];  Service:    • EMANUEL BY LAPAROSCOPY  2011    Performed by VERO WRIGHT at [facility]   • ABDOMINAL ABSCESS DRAINAGE  2011    Performed by VERO WRIGHT at [facility]   • OTHER  1982    tonsils   • APPENDECTOMY     • CHOLECYSTECTOMY     • GYN SURGERY      C section X 4   • OTHER ABDOMINAL SURGERY      small colon block   • PB  DELIVERY ONLY      x 4   • TONSILLECTOMY     • WRIST ORIF         Family history   History reviewed. No pertinent family history.      Medications:   Outpatient Medications Marked as Taking for the 19 encounter (Office Visit) with Jayy Kerr M.D.   Medication Sig Dispense Refill   • oxyCODONE-acetaminophen (PERCOCET) 5-325 MG Tab Take 1 Tab by mouth every 6 hours as needed for Severe Pain for up to 30 days. 120 Tab 0   • [START ON 2019] oxyCODONE-acetaminophen (PERCOCET) 5-325 MG Tab Take 1-2 Tabs by mouth every 6 hours as needed for up to 30 days. 120 Tab 0   • gabapentin (NEURONTIN) 800 MG tablet Take 1 Tab by mouth 4 times a day for 90 days. 360 Tab 0   • QUEtiapine (SEROQUEL) 100 MG Tab Take 100 mg by mouth every evening.     • PARoxetine (PAXIL) 30 MG Tab Take 60 mg by mouth every evening.     • albuterol (VENTOLIN OR PROVENTIL) 108 (90 BASE) MCG/ACT AERS inhalation aerosol Inhale 2 Puffs by mouth every four hours as needed for Shortness of  Breath.         Allergies:   Allergies   Allergen Reactions   • Penicillins Anaphylaxis and Hives     Tolerates cephalosporins; reports throat swelling with PCN   • Nitrous Oxide Vomiting   • Aripiprazole [Abilify] Nausea     Spasms, shaking       Social Hx:   Social History     Socioeconomic History   • Marital status:      Spouse name: Not on file   • Number of children: Not on file   • Years of education: Not on file   • Highest education level: Not on file   Occupational History   • Not on file   Social Needs   • Financial resource strain: Not on file   • Food insecurity:     Worry: Not on file     Inability: Not on file   • Transportation needs:     Medical: Not on file     Non-medical: Not on file   Tobacco Use   • Smoking status: Current Every Day Smoker     Packs/day: 1.00     Years: 30.00     Pack years: 30.00     Types: Cigarettes   • Smokeless tobacco: Never Used   • Tobacco comment: 1 ppd   Substance and Sexual Activity   • Alcohol use: No   • Drug use: No   • Sexual activity: Not on file   Lifestyle   • Physical activity:     Days per week: Not on file     Minutes per session: Not on file   • Stress: Not on file   Relationships   • Social connections:     Talks on phone: Not on file     Gets together: Not on file     Attends Taoist service: Not on file     Active member of club or organization: Not on file     Attends meetings of clubs or organizations: Not on file     Relationship status: Not on file   • Intimate partner violence:     Fear of current or ex partner: Not on file     Emotionally abused: Not on file     Physically abused: Not on file     Forced sexual activity: Not on file   Other Topics Concern   •  Service No   • Blood Transfusions Yes   • Caffeine Concern No   • Occupational Exposure No   • Hobby Hazards No   • Sleep Concern No   • Stress Concern Yes   • Weight Concern No   • Special Diet No   • Back Care No   • Exercise Yes   • Bike Helmet Yes   • Seat Belt Yes   •  "Self-Exams Yes   Social History Narrative    ** Merged History Encounter **              EXAMINATION     Physical Exam:   Vitals: /82 (BP Location: Left arm, Patient Position: Sitting, BP Cuff Size: Adult long)   Pulse 99   Temp 37.1 °C (98.7 °F) (Temporal)   Ht 1.6 m (5' 3\")   Wt 54.5 kg (120 lb 2.4 oz)   SpO2 97%     Constitutional:   Body Habitus: Body mass index is 21.28 kg/m².  Cooperation: Fully cooperates with exam  Appearance: Well-groomed no disheveled, in no acute distress  Respiratory-  breathing comfortable on room air, no audible wheezing  Cardiovascular-  No lower extremity edema is noted.   Psychiatric- alert and oriented ×3. Normal affect.     Musculoskeletal:     Partial hand amputation on the right at the MCPs; ulnar deviation on the left with MCP subluxation, mild atrophy of the hand intrinsics with wasting of the thenar eminence.   Nontender cyst like structure over the dorsum of the left wrist.  Negative compression test on the left wrist.    Right fourth digit amputation without erythema or warmth noted.  No signs of drainage      MEDICAL DECISION MAKING    DATA    Labs: UDS 10/30/2018 consistent with medications.  Oxycodone and metabolites without other substances   UDS 04/19/2019 consistent with medications. Oxycodone and metabolites without other substances   UDS 07/19/2019 consistent with medications.  Oxycodone and metabolites without other substances     Imaging:  No new imaging  MRI cervical spine 07/31/2018:  Films reviewed.  These are my reads:  There is is note of motion at the atlantodental level with 5mm atlantodental inverval in flexion that reduces to 1-2 mm in extension.  Mild retrolisthesis of C4 on C5 and anterolisthesis of C5- on C6.  Disc extrusion at C6-7 with mild central canal stenosis    Xray left shoulder 2/5/2018 Humeral head is elevated.  Glenohumeral joint arthritis.    Reports reviewed:  Xray cervical spine 11/14/2018  1. No acute fracture  2. Persistent " motion at the C1-2 joint as before, suggesting atlantoaxial instability  3. Minimal instability noted at C5-6 level as described.    MRI cervical spine 07/31/2018  1. Widening of the atlantodental interal in neutral and flexion positions with a 5mm space which reduces to 1-2 mm in extension  2. 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 Rheumatoid arthritis involving multiple sites with positive rheumatoid factor (HCC) M05.79   2. 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 Chronic, continuous use of opioids F11.90   4. Neuropathic pain M79.2   5. Depression, unspecified depression type F32.9         ASSESSMENT and PLAN:     Mary Martinez 49 y.o. female with rheumatoid arthritis    Mary was seen today for follow-up.    Orders and management for this visit:    Continue gabapentin 800mg po QID. Start to wean.  Instruction given to decrease to 800mg po tid.  We will reassess.    Continue follow-up with Psychology and Psychiatry.  Denies suicidality at this time.    Continue plans for consultation with orthopedics.  Encourged her to do this, while there is no urgency.    Her neck symptoms are stable, currently.  Continue conservative care.    Continue plans for follow-up with surgeon, Dr. Dumont for postsurgical care    Refill percocet given, 5/325 #120 with one future refill given.  She has returned to her baseline dose of oxycodone.  UDS compliant 07/19/2019.  Plan to recheck today.    Continue to follow-up with Dr. Dela Cruz, Rheumatology for management of other medications related to rheumatoid arthritis.    Encouraged her to continue with efforts to quit smoking, but this was less than 3 minutes.  We will revisit this in a few months.    In prescribing controlled substances to  this patient, I certify that I have obtained and reviewed the medical history of Mary Martinez. I have also made a good aristides effort to obtain applicable records from other providers who have treated the patient and records did not demonstrate any increased risk of substance abuse that would prevent me from prescribing controlled substances.     I have conducted a physical exam and documented it. I have reviewed Ms. Martinez’s prescription history as maintained by the [place] Prescription Monitoring Program.   ORT 4, indicates moderate risk    I have assessed the patient’s risk for abuse, dependency, and addiction using the validated Opioid Risk Tool available at https://www.mdcalc.com/znxwzo-cnde-alwb-ort-narcotic-abuse.     Given the above, I believe the benefits of controlled substance therapy outweigh the risks. The reasons for prescribing controlled substances include non-narcotic, oral analgesic alternatives have been inadequate for pain control and in my professional opinion, controlled substances are the only reasonable choice for this patient because her pain was note adequately controlled with alternatives and she has chronic progressive disease. Accordingly, I have discussed the risk and benefits, treatment plan, and alternative therapies with the patient.         Follow up: 8 weeks      Please note that this dictation was created using voice recognition software. I have made every reasonable attempt to correct obvious errors but there may be errors of grammar and content that I may have overlooked prior to finalization of this note.      Jayy Kerr MD  Interventional Spine and Sports Physiatry  Physical Medicine and Rehabilitation  [facility]

## 2020-01-21 ENCOUNTER — OFFICE VISIT (OUTPATIENT)
Dept: PHYSICAL MEDICINE AND REHAB | Facility: MEDICAL CENTER | Age: 50
End: 2020-01-21
Payer: MEDICAID

## 2020-01-21 VITALS
BODY MASS INDEX: 20.98 KG/M2 | HEART RATE: 100 BPM | WEIGHT: 118.39 LBS | OXYGEN SATURATION: 95 % | HEIGHT: 63 IN | TEMPERATURE: 97.5 F | DIASTOLIC BLOOD PRESSURE: 82 MMHG | SYSTOLIC BLOOD PRESSURE: 120 MMHG

## 2020-01-21 DIAGNOSIS — M53.2X1 ATLANTOAXIAL INSTABILITY: ICD-10-CM

## 2020-01-21 DIAGNOSIS — M19.019 SHOULDER ARTHRITIS: ICD-10-CM

## 2020-01-21 DIAGNOSIS — F11.90 CHRONIC, CONTINUOUS USE OF OPIOIDS: ICD-10-CM

## 2020-01-21 DIAGNOSIS — M05.79 RHEUMATOID ARTHRITIS INVOLVING MULTIPLE SITES WITH POSITIVE RHEUMATOID FACTOR (HCC): ICD-10-CM

## 2020-01-21 DIAGNOSIS — M21.942 HAND DEFORMITY, ACQUIRED, LEFT: ICD-10-CM

## 2020-01-21 DIAGNOSIS — F32.A DEPRESSION, UNSPECIFIED DEPRESSION TYPE: ICD-10-CM

## 2020-01-21 DIAGNOSIS — S68.411S AMPUTATION OF RIGHT HAND, SEQUELA (HCC): ICD-10-CM

## 2020-01-21 PROCEDURE — 99214 OFFICE O/P EST MOD 30 MIN: CPT | Performed by: PHYSICAL MEDICINE & REHABILITATION

## 2020-01-21 RX ORDER — RANITIDINE 150 MG/1
TABLET ORAL
COMMUNITY
Start: 2019-12-19 | End: 2020-04-24

## 2020-01-21 RX ORDER — FOLIC ACID 1 MG/1
TABLET ORAL
Refills: 3 | COMMUNITY
Start: 2019-11-12 | End: 2020-04-24

## 2020-01-21 RX ORDER — ONDANSETRON 8 MG/1
8 TABLET, ORALLY DISINTEGRATING ORAL EVERY 8 HOURS PRN
COMMUNITY
Start: 2020-01-06 | End: 2023-01-19

## 2020-01-21 RX ORDER — OXYCODONE HYDROCHLORIDE AND ACETAMINOPHEN 5; 325 MG/1; MG/1
1-2 TABLET ORAL EVERY 6 HOURS PRN
Qty: 150 TAB | Refills: 0 | Status: SHIPPED | OUTPATIENT
Start: 2020-01-21 | End: 2020-02-20

## 2020-01-21 ASSESSMENT — PAIN SCALES - GENERAL: PAINLEVEL: 8=MODERATE-SEVERE PAIN

## 2020-01-21 ASSESSMENT — PATIENT HEALTH QUESTIONNAIRE - PHQ9
CLINICAL INTERPRETATION OF PHQ2 SCORE: 2
SUM OF ALL RESPONSES TO PHQ QUESTIONS 1-9: 11
5. POOR APPETITE OR OVEREATING: 2 - MORE THAN HALF THE DAYS

## 2020-01-21 NOTE — PROGRESS NOTES
Follow up patient note  Pain Medicine, Interventional spine and sports physiatry, Physical medicine rehabilitation      Chief complaint:   Joint pain      HISTORY    Please see new patient note dated 06/08/2018 by Dr Kerr,  for more details.     [facility]  Patient identification: Mary Martinez 49 y.o. female returns for follow-up of her pain related to rheumatoid arthritis.      Interval history:   Mary reports that she has been having chronic pain complaints related to Rheumatoid arthritis.    She has been having more pain in her left shoulder and left index finger.  This has not been responding to paraffin bath.    Since the last visit, she has not had any drainage from her right residual digit 4th digit.    Shoulder pain limits her ADLs, but she has been having trouble washing her hair.    No significant change in neck pain.  She has some audible neck cracking.    Her percocet 5/325 q6h.  No constipation.      More abdominal surgery is likely upcoming.  Pain has been worse.  GI cocktail helps somewhat.    Smoking cessation is on hold for now.    She has discontinued gabapentin.  No significant change in pain.    She continues to follow-up with Psychiatry.  She has been doing well.  She now has a  dog.      PHQ-9: 27, down to 11  ORT: 4    ROS  Unchanged from previous visit 11/22/2019 except as noted in the HPI  Red Flags :   Fever, Chills, Sweats: Denies  Involuntary Weight Loss: Denies  Bowel/Bladder Incontinence: Denies      PMHx:   Past Medical History:   Diagnosis Date   • ASTHMA     inhalers prn   • Dental disorder     upper partial   • Pain     everywhere   • Psychiatric problem     anxiety   • Rheumatoid arthritis(714.0) 2003       PSHx:   Past Surgical History:   Procedure Laterality Date   • PB EXPLORATORY OF ABDOMEN N/A 10/4/2019    Procedure: LAPAROTOMY, EXPLORATORY AND REPAIR OF DUODENAL PERFORATION;  Surgeon: James Dumont M.D.;  Location: SURGERY [facility];  Service: General    • FINGER ARTHROPLASTY Right 6/5/2017    Procedure: FINGER ARTHROPLASTY - LONG, RING AND SMALL VOLAR PLATE;  Surgeon: Lobo Rosen M.D.;  Location: SURGERY SAME DAY [facility];  Service:    • FINGER AMPUTATION  6/5/2017    Procedure: FINGER AMPUTATION - LONG, RING AND SMALL AT THE PROXIMAL INTERPHALANGEAL JOINT;  Surgeon: Lobo Rosen M.D.;  Location: SURGERY SAME DAY [facility];  Service:    • FINGER ARTHROPLASTY Right 4/17/2017    Procedure: FINGER ARTHROPLASTY ;  Surgeon: Lobo Rosen M.D.;  Location: SURGERY SAME DAY [facility];  Service:    • FINGER AMPUTATION Right 9/14/2016    Procedure: FINGER AMPUTATION INDEX;  Surgeon: Lobo Rosen M.D.;  Location: SURGERY SAME DAY [facility];  Service:    • IRRIGATION & DEBRIDEMENT ORTHO Right 9/11/2016    Procedure: IRRIGATION & DEBRIDEMENT ORTHO - right index finger;  Surgeon: Madhu Rosen M.D.;  Location: [facility];  Service:    • PIP ARTHRODESIS Right 8/29/2016    Procedure: RE-DO INDEX FINGER PROXIMAL INTERPHALANGEAL ARTHRODESIS;  Surgeon: Lobo Rosen M.D.;  Location: SURGERY SAME DAY [facility];  Service:    • BONE GRAFT Right 8/29/2016    Procedure: BONE GRAFT - DISTAL RADIUS ;  Surgeon: Lobo Rosen M.D.;  Location: SURGERY SAME DAY [facility];  Service:    • ARTHRODESIS Right 5/6/2016    Procedure: ARTHRODESIS INDEX FINGER PROXIMAL INTERPHALANGEAL;  Surgeon: Lobo Rosen M.D.;  Location: SURGERY SAME DAY [facility];  Service:    • FOOT RECONSTRUCTION RHEUMATIC Left 7/29/2015    Procedure: FOOT RECONSTRUCTION RHEUMATIC;  Surgeon: Heriberto Alves M.D.;  Location: [facility];  Service:    • TOE FUSION Right 5/27/2015    Procedure: TOE FUSION 1ST METATARSALPHALANGEAL JOINT;  Surgeon: Heriberto Alves M.D.;  Location: [facility];  Service:    • BONE SPUR EXCISION  5/27/2015    Procedure: BONE SPUR EXCISION METATARSAL HEAD 2-3;  Surgeon: Heriberto  FRANKLIN Alves;  Location: SURGERY [facility];  Service:    • HAMMERTOE CORRECTION  2015    Procedure: HAMMERTOE CORRECTION AND SOFT TISSUE RE-ALINGMENT 2-3 ;  Surgeon: Heriberto Alves M.D.;  Location: SURGERY [facility];  Service:    • EMANUEL BY LAPAROSCOPY  2011    Performed by VERO WRIGHT at SURGERY [facility]   • ABDOMINAL ABSCESS DRAINAGE  2011    Performed by VERO WRIGHT at SURGERY [facility] ORS   • OTHER  1982    tonsils   • APPENDECTOMY     • CHOLECYSTECTOMY     • GYN SURGERY      C section X 4   • OTHER ABDOMINAL SURGERY      small colon block   • PB  DELIVERY ONLY      x 4   • TONSILLECTOMY     • WRIST ORIF         Family history   History reviewed. No pertinent family history.      Medications:   Outpatient Medications Marked as Taking for the 20 encounter (Office Visit) with Jayy Kerr M.D.   Medication Sig Dispense Refill   • oxyCODONE-acetaminophen (PERCOCET) 5-325 MG Tab Take 1-2 Tabs by mouth every 6 hours as needed for Severe Pain ((up to 5/day)) for up to 30 days. 150 Tab 0   • Diclofenac Sodium 1 % Gel Apply 2 g to skin as directed 4 times a day as needed for up to 30 days. 80 g 2   • QUEtiapine (SEROQUEL) 100 MG Tab Take 100 mg by mouth every evening.     • PARoxetine (PAXIL) 30 MG Tab Take 60 mg by mouth every evening.     • albuterol (VENTOLIN OR PROVENTIL) 108 (90 BASE) MCG/ACT AERS inhalation aerosol Inhale 2 Puffs by mouth every four hours as needed for Shortness of Breath.         Allergies:   Allergies   Allergen Reactions   • Penicillins Anaphylaxis and Hives     Tolerates cephalosporins; reports throat swelling with PCN   • Nitrous Oxide Vomiting   • Aripiprazole [Abilify] Nausea     Spasms, shaking       Social Hx:   Social History     Socioeconomic History   • Marital status:      Spouse name: Not on file   • Number of children: Not on file   • Years of education: Not on file   • Highest education level: Not on file  "  Occupational History   • Not on file   Social Needs   • Financial resource strain: Not on file   • Food insecurity:     Worry: Not on file     Inability: Not on file   • Transportation needs:     Medical: Not on file     Non-medical: Not on file   Tobacco Use   • Smoking status: Current Every Day Smoker     Packs/day: 1.00     Years: 30.00     Pack years: 30.00     Types: Cigarettes   • Smokeless tobacco: Never Used   • Tobacco comment: 1 ppd   Substance and Sexual Activity   • Alcohol use: No   • Drug use: No   • Sexual activity: Not on file   Lifestyle   • Physical activity:     Days per week: Not on file     Minutes per session: Not on file   • Stress: Not on file   Relationships   • Social connections:     Talks on phone: Not on file     Gets together: Not on file     Attends Buddhism service: Not on file     Active member of club or organization: Not on file     Attends meetings of clubs or organizations: Not on file     Relationship status: Not on file   • Intimate partner violence:     Fear of current or ex partner: Not on file     Emotionally abused: Not on file     Physically abused: Not on file     Forced sexual activity: Not on file   Other Topics Concern   •  Service No   • Blood Transfusions Yes   • Caffeine Concern No   • Occupational Exposure No   • Hobby Hazards No   • Sleep Concern No   • Stress Concern Yes   • Weight Concern No   • Special Diet No   • Back Care No   • Exercise Yes   • Bike Helmet Yes   • Seat Belt Yes   • Self-Exams Yes   Social History Narrative    ** Merged History Encounter **              EXAMINATION     Physical Exam:   Vitals: /82 (BP Location: Left arm, Patient Position: Sitting, BP Cuff Size: Small adult)   Pulse 100   Temp 36.4 °C (97.5 °F) (Temporal)   Ht 1.6 m (5' 3\")   Wt 53.7 kg (118 lb 6.2 oz)   SpO2 95%     Constitutional:   Body Habitus: Body mass index is 20.97 kg/m².  Cooperation: Fully cooperates with exam  Appearance: Well-groomed no " disheveled, in no acute distress  Respiratory-  breathing comfortable on room air, no audible wheezing  Cardiovascular-  No lower extremity edema is noted.   Psychiatric- alert and oriented ×3. Normal affect.     Musculoskeletal:    Left shoulder pain with abduction.  Mild crepitus noted    Partial hand amputation on the right at the MCPs; ulnar deviation on the left with MCP subluxation, mild atrophy of the hand intrinsics with wasting of the thenar eminence.   Nontender cyst like structure over the dorsum of the left wrist.  Negative compression test on the left wrist.    Right fourth digit amputation without erythema or warmth noted.  No signs of drainage      MEDICAL DECISION MAKING    DATA    Labs: UDS 10/30/2018 consistent with medications.  Oxycodone and metabolites without other substances   UDS 04/19/2019 consistent with medications. Oxycodone and metabolites without other substances   UDS 07/19/2019 consistent with medications.  Oxycodone and metabolites without other substances   UDS 11/22/2019 consistent with medications.  Oxycodone and metabolites without other substances     Imaging:  No new imaging  MRI cervical spine 07/31/2018:  Films reviewed.  These are my reads:  There is is note of motion at the atlantodental level with 5mm atlantodental inverval in flexion that reduces to 1-2 mm in extension.  Mild retrolisthesis of C4 on C5 and anterolisthesis of C5- on C6.  Disc extrusion at C6-7 with mild central canal stenosis    Xray left shoulder 2/5/2018 Humeral head is elevated.  Glenohumeral joint arthritis.    Reports reviewed:  Xray cervical spine 11/14/2018  1. No acute fracture  2. Persistent motion at the C1-2 joint as before, suggesting atlantoaxial instability  3. Minimal instability noted at C5-6 level as described.    MRI cervical spine 07/31/2018  1. Widening of the atlantodental interal in neutral and flexion positions with a 5mm space which reduces to 1-2 mm in extension  2. Degenerative  changes with the disc extrusion at C6-7 level causing mild canal stenosis    Xray cervical spine 07/06/2018: Atlantoaxial instability at C1-2     Xrays knees 07/06/2018  Left: Unremarkable  Right: Unremarkable             DIAGNOSIS   Visit Diagnoses     ICD-10-CM   1. Rheumatoid arthritis involving multiple sites with positive rheumatoid factor ([facility]) M05.79   2. 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 Amputation of right hand, sequela ([facility]) S68.411S   4. Shoulder arthritis M19.019   5. Hand deformity, acquired, left M21.942   6. Depression, unspecified depression type F32.9   7. Chronic, continuous use of opioids F11.90         ASSESSMENT and PLAN:     Mary Martinez 49 y.o. female with rheumatoid arthritis    Mary was seen today for follow-up.    Orders and management for this visit:    Okay to discontinue gabapentin. No significant changes in symptoms.    Continue with Psychology and Psychiatry.  Denies suicidality at this time.    Continue plans for consultation with orthopedics.  Encourged her to do this, while there is no urgency, prioritize GI surgery.    Her neck symptoms are stable, currently.  No changes, plan conservative care.    Refill percocet given, 5/325 #120, plan to increase to #150 for this month.  Anticipate wean after upcoming GI surgery.    Diclofenac gel trial for left shoulder.  Discussed using 2g up to four times a day.  Advised that she allow the gel to dry for 10 minutes prior to putting on clothes.    Continue to follow-up with Dr. Dela Cruz, Rheumatology.    In prescribing controlled substances to this patient, I certify that I have obtained and reviewed the medical history of Mary Martinez. I have also made a good aristides effort to obtain applicable records from other providers who have treated the patient and records did not demonstrate any increased risk of substance abuse that would prevent me from prescribing controlled substances.     I have conducted a physical exam and  documented it. I have reviewed Ms. Martinez’s prescription history as maintained by the [place] Prescription Monitoring Program.   ORT 4, indicates moderate risk    I have assessed the patient’s risk for abuse, dependency, and addiction using the validated Opioid Risk Tool available at https://www.mdcalc.com/nqrydv-gnfg-femq-ort-narcotic-abuse.     Given the above, I believe the benefits of controlled substance therapy outweigh the risks. The reasons for prescribing controlled substances include non-narcotic, oral analgesic alternatives have been inadequate for pain control and in my professional opinion, controlled substances are the only reasonable choice for this patient because her pain was note adequately controlled with alternatives and she has chronic progressive disease. Accordingly, I have discussed the risk and benefits, treatment plan, and alternative therapies with the patient.         Follow up: 4 weeks      Please note that this dictation was created using voice recognition software. I have made every reasonable attempt to correct obvious errors but there may be errors of grammar and content that I may have overlooked prior to finalization of this note.      Jayy Kerr MD  Interventional Spine and Sports Physiatry  Physical Medicine and Rehabilitation  [facility]

## 2020-02-20 ENCOUNTER — OFFICE VISIT (OUTPATIENT)
Dept: PHYSICAL MEDICINE AND REHAB | Facility: MEDICAL CENTER | Age: 50
End: 2020-02-20
Payer: MEDICAID

## 2020-02-20 VITALS
OXYGEN SATURATION: 96 % | HEART RATE: 109 BPM | SYSTOLIC BLOOD PRESSURE: 100 MMHG | DIASTOLIC BLOOD PRESSURE: 60 MMHG | BODY MASS INDEX: 21.33 KG/M2 | WEIGHT: 120.37 LBS | TEMPERATURE: 98.7 F | HEIGHT: 63 IN

## 2020-02-20 DIAGNOSIS — F32.A DEPRESSION, UNSPECIFIED DEPRESSION TYPE: ICD-10-CM

## 2020-02-20 DIAGNOSIS — M19.019 SHOULDER ARTHRITIS: ICD-10-CM

## 2020-02-20 DIAGNOSIS — M53.2X1 ATLANTOAXIAL INSTABILITY: ICD-10-CM

## 2020-02-20 DIAGNOSIS — F11.90 CHRONIC, CONTINUOUS USE OF OPIOIDS: ICD-10-CM

## 2020-02-20 DIAGNOSIS — M05.79 RHEUMATOID ARTHRITIS INVOLVING MULTIPLE SITES WITH POSITIVE RHEUMATOID FACTOR (HCC): ICD-10-CM

## 2020-02-20 DIAGNOSIS — S68.411S AMPUTATION OF RIGHT HAND, SEQUELA (HCC): ICD-10-CM

## 2020-02-20 PROCEDURE — 99214 OFFICE O/P EST MOD 30 MIN: CPT | Performed by: PHYSICAL MEDICINE & REHABILITATION

## 2020-02-20 RX ORDER — OXYCODONE AND ACETAMINOPHEN 7.5; 325 MG/1; MG/1
1 TABLET ORAL EVERY 4 HOURS PRN
Qty: 150 TAB | Refills: 0 | Status: SHIPPED | OUTPATIENT
Start: 2020-02-20 | End: 2020-03-17 | Stop reason: SDUPTHER

## 2020-02-20 ASSESSMENT — PATIENT HEALTH QUESTIONNAIRE - PHQ9
CLINICAL INTERPRETATION OF PHQ2 SCORE: 4
SUM OF ALL RESPONSES TO PHQ QUESTIONS 1-9: 18
5. POOR APPETITE OR OVEREATING: 3 - NEARLY EVERY DAY

## 2020-02-20 ASSESSMENT — PAIN SCALES - GENERAL: PAINLEVEL: 10=SEVERE PAIN

## 2020-02-21 NOTE — PROGRESS NOTES
Follow up patient note  Pain Medicine, Interventional spine and sports physiatry, Physical medicine rehabilitation      Chief complaint:   Joint pain      HISTORY    Please see new patient note dated 06/08/2018 by Dr Kerr,  for more details.     [facility]  Patient identification: Mary Martinez 49 y.o. female returns for follow-up of her pain related to rheumatoid arthritis.      Interval history:     Mary reports that her knees are hurting her.  No trigger.   Ankles, wrists, hands and shoulders with increased pain.  She is out of her percocet5/325, but reports that this has not been helping with the pain.  Her neck pain has been stable.  It does crack.  Dr. Dela Cruz is her Rheumatologist.  She was taken off embrel for infection concerns in October 2019.    Shoulder pain limits her ADLs, but she continues to have trouble washing her hair.    No constipation.    More abdominal surgery is likely upcoming, she thinks.  Pain has been worse.  GI cocktail helps somewhat.      Smoking cessation is on hold for now, she does not feel like she is ready to work on this.    She has discontinued gabapentin.  No significant change in pain.    She continues to follow-up with Psychiatry.  She has been doing well.  She now has a  dog, which she brought to her appointment today.      PHQ-9: 18, denies suicidality  ORT: 4    ROS  Unchanged from previous visit 1/21/2020 except as noted in the HPI  Red Flags :   Fever, Chills, Sweats: Denies  Involuntary Weight Loss: Denies  Bowel/Bladder Incontinence: Denies      PMHx:   Past Medical History:   Diagnosis Date   • ASTHMA     inhalers prn   • Dental disorder     upper partial   • Pain     everywhere   • Psychiatric problem     anxiety   • Rheumatoid arthritis(714.0) 2003       PSHx:   Past Surgical History:   Procedure Laterality Date   • PB EXPLORATORY OF ABDOMEN N/A 10/4/2019    Procedure: LAPAROTOMY, EXPLORATORY AND REPAIR OF DUODENAL PERFORATION;  Surgeon: James Dumont,  M.D.;  Location: SURGERY [facility];  Service: General   • FINGER ARTHROPLASTY Right 6/5/2017    Procedure: FINGER ARTHROPLASTY - LONG, RING AND SMALL VOLAR PLATE;  Surgeon: Lobo Rosen M.D.;  Location: SURGERY SAME DAY [facility];  Service:    • FINGER AMPUTATION  6/5/2017    Procedure: FINGER AMPUTATION - LONG, RING AND SMALL AT THE PROXIMAL INTERPHALANGEAL JOINT;  Surgeon: Lobo Rosen M.D.;  Location: SURGERY SAME DAY [facility];  Service:    • FINGER ARTHROPLASTY Right 4/17/2017    Procedure: FINGER ARTHROPLASTY ;  Surgeon: Lobo Rosen M.D.;  Location: SURGERY SAME DAY [facility];  Service:    • FINGER AMPUTATION Right 9/14/2016    Procedure: FINGER AMPUTATION INDEX;  Surgeon: Lobo Rosen M.D.;  Location: SURGERY SAME DAY [facility];  Service:    • IRRIGATION & DEBRIDEMENT ORTHO Right 9/11/2016    Procedure: IRRIGATION & DEBRIDEMENT ORTHO - right index finger;  Surgeon: Madhu Rosen M.D.;  Location: SURGERY [facility];  Service:    • PIP ARTHRODESIS Right 8/29/2016    Procedure: RE-DO INDEX FINGER PROXIMAL INTERPHALANGEAL ARTHRODESIS;  Surgeon: Lobo Rosen M.D.;  Location: SURGERY SAME DAY [facility];  Service:    • BONE GRAFT Right 8/29/2016    Procedure: BONE GRAFT - DISTAL RADIUS ;  Surgeon: Lobo Rosen M.D.;  Location: SURGERY SAME DAY [facility];  Service:    • ARTHRODESIS Right 5/6/2016    Procedure: ARTHRODESIS INDEX FINGER PROXIMAL INTERPHALANGEAL;  Surgeon: Lobo Rosen M.D.;  Location: SURGERY SAME DAY [facility];  Service:    • FOOT RECONSTRUCTION RHEUMATIC Left 7/29/2015    Procedure: FOOT RECONSTRUCTION RHEUMATIC;  Surgeon: Heriberto Alves M.D.;  Location: SURGERY [facility];  Service:    • TOE FUSION Right 5/27/2015    Procedure: TOE FUSION 1ST METATARSALPHALANGEAL JOINT;  Surgeon: Heriberto Alves M.D.;  Location: SURGERY [facility];  Service:    • BONE SPUR EXCISION  5/27/2015    Procedure:  BONE SPUR EXCISION METATARSAL HEAD 2-3;  Surgeon: Heriberto Alves M.D.;  Location: SURGERY [facility];  Service:    • HAMMERTOE CORRECTION  2015    Procedure: HAMMERTOE CORRECTION AND SOFT TISSUE RE-ALINGMENT 2-3 ;  Surgeon: Heriberto Alves M.D.;  Location: SURGERY [facility];  Service:    • EMANUEL BY LAPAROSCOPY  2011    Performed by VERO WRIGHT at SURGERY [facility]   • ABDOMINAL ABSCESS DRAINAGE  2011    Performed by VERO WRIGHT at SURGERY [facility]   • OTHER  1982    tonsils   • APPENDECTOMY     • CHOLECYSTECTOMY     • GYN SURGERY      C section X 4   • OTHER ABDOMINAL SURGERY      small colon block   • PB  DELIVERY ONLY      x 4   • TONSILLECTOMY     • WRIST ORIF         Family history   History reviewed. No pertinent family history.      Medications:   Outpatient Medications Marked as Taking for the 20 encounter (Office Visit) with Jayy Kerr M.D.   Medication Sig Dispense Refill   • oxyCODONE-acetaminophen (PERCOCET) 7.5-325 MG per tablet Take 1 Tab by mouth every four hours as needed for Severe Pain for up to 30 days. 150 Tab 0   • ondansetron (ZOFRAN ODT) 8 MG TABLET DISPERSIBLE      • Diclofenac Sodium 1 % Gel Apply 2 g to skin as directed 4 times a day as needed for up to 30 days. 80 g 2   • omeprazole (PRILOSEC) 20 MG delayed-release capsule Take 1 Cap by mouth every 12 hours. 60 Cap 2   • QUEtiapine (SEROQUEL) 100 MG Tab Take 100 mg by mouth every evening.     • PARoxetine (PAXIL) 30 MG Tab Take 60 mg by mouth every evening.     • albuterol (VENTOLIN OR PROVENTIL) 108 (90 BASE) MCG/ACT AERS inhalation aerosol Inhale 2 Puffs by mouth every four hours as needed for Shortness of Breath.         Allergies:   Allergies   Allergen Reactions   • Penicillins Anaphylaxis and Hives     Tolerates cephalosporins; reports throat swelling with PCN   • Nitrous Oxide Vomiting   • Aripiprazole [Abilify] Nausea     Spasms, shaking       Social Hx:   Social  History     Socioeconomic History   • Marital status:      Spouse name: Not on file   • Number of children: Not on file   • Years of education: Not on file   • Highest education level: Not on file   Occupational History   • Not on file   Social Needs   • Financial resource strain: Not on file   • Food insecurity     Worry: Not on file     Inability: Not on file   • Transportation needs     Medical: Not on file     Non-medical: Not on file   Tobacco Use   • Smoking status: Current Every Day Smoker     Packs/day: 1.00     Years: 30.00     Pack years: 30.00     Types: Cigarettes   • Smokeless tobacco: Never Used   • Tobacco comment: 1 ppd   Substance and Sexual Activity   • Alcohol use: No   • Drug use: No   • Sexual activity: Not on file   Lifestyle   • Physical activity     Days per week: Not on file     Minutes per session: Not on file   • Stress: Not on file   Relationships   • Social connections     Talks on phone: Not on file     Gets together: Not on file     Attends Islam service: Not on file     Active member of club or organization: Not on file     Attends meetings of clubs or organizations: Not on file     Relationship status: Not on file   • Intimate partner violence     Fear of current or ex partner: Not on file     Emotionally abused: Not on file     Physically abused: Not on file     Forced sexual activity: Not on file   Other Topics Concern   •  Service No   • Blood Transfusions Yes   • Caffeine Concern No   • Occupational Exposure No   • Hobby Hazards No   • Sleep Concern No   • Stress Concern Yes   • Weight Concern No   • Special Diet No   • Back Care No   • Exercise Yes   • Bike Helmet Yes   • Seat Belt Yes   • Self-Exams Yes   Social History Narrative    ** Merged History Encounter **              EXAMINATION     Physical Exam:   Vitals: /60 (BP Location: Left arm, Patient Position: Sitting, BP Cuff Size: Small adult)   Pulse (!) 109   Temp 37.1 °C (98.7 °F) (Temporal)    "Ht 1.6 m (5' 3\")   Wt 54.6 kg (120 lb 5.9 oz)   SpO2 96%     Constitutional:   Body Habitus: Body mass index is 21.32 kg/m².  Cooperation: Fully cooperates with exam  Appearance: Well-groomed no disheveled, in no acute distress  Respiratory-  breathing comfortable on room air, no audible wheezing  Cardiovascular-  No lower extremity edema is noted.   Psychiatric- alert and oriented ×3. Normal affect.     Musculoskeletal:    Left and right shoulder pain with abduction past 50 degrees abduction.  Mild crepitus noted    Partial hand amputation on the right at the MCPs; ulnar deviation on the left with MCP subluxation, mild atrophy of the hand intrinsics with wasting of the thenar eminence.   Nontender cyst like structure over the dorsum of the left wrist.  Negative compression test on the left wrist.    Right fourth digit amputation without erythema or warmth noted.  No signs of drainage      MEDICAL DECISION MAKING    DATA    Labs: UDS 10/30/2018 consistent with medications.  Oxycodone and metabolites without other substances   UDS 04/19/2019 consistent with medications. Oxycodone and metabolites without other substances   UDS 07/19/2019 consistent with medications.  Oxycodone and metabolites without other substances   UDS 11/22/2019 consistent with medications.  Oxycodone and metabolites without other substances     Imaging:    MRI cervical spine 07/31/2018:  Films reviewed.  These are my reads:  There is is note of motion at the atlantodental level with 5mm atlantodental inverval in flexion that reduces to 1-2 mm in extension.  Mild retrolisthesis of C4 on C5 and anterolisthesis of C5- on C6.  Disc extrusion at C6-7 with mild central canal stenosis    Xray left shoulder 2/5/2018 Humeral head is elevated.  Glenohumeral joint arthritis.    Reports reviewed:  Xray cervical spine 11/14/2018  1. No acute fracture  2. Persistent motion at the C1-2 joint as before, suggesting atlantoaxial instability  3. Minimal " instability noted at C5-6 level as described.    MRI cervical spine 07/31/2018  1. Widening of the atlantodental interal in neutral and flexion positions with a 5mm space which reduces to 1-2 mm in extension  2. 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 Rheumatoid arthritis involving multiple sites with positive rheumatoid factor ([facility]) M05.79   2. 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 Amputation of right hand, sequela ([facility]) S68.411S   4. Chronic, continuous use of opioids F11.90   5. Depression, unspecified depression type F32.9   6. Shoulder arthritis M19.019         ASSESSMENT and PLAN:     Mary Martinez 49 y.o. female with rheumatoid arthritis    Mary was seen today for follow-up.    Orders and management for this visit:    1. Call Dr. Dela Cruz and follow-up. Suspect that her pain is worse since she has been off of embrel for 3 months.  2. Increase percocet 7.5/325 #150.  Discussed that we will trial this for this month.  3. Continue diclofenac gel for shoulders.  Discussed using 2g up to four times a day.  She will allow the gel to dry for 10 minutes prior to covering.  4. Neck pain has been stable.  5. Continue follow-up with Psychology and Psychiatry.  She denies suicidality  6. Continue to follow-up with GI and PCP.  7. Repeat UDS today.   reviewed.    In prescribing controlled substances to this patient, I certify that I have obtained and reviewed the medical history of Mary Martinez. I have also made a good aristides effort to obtain applicable records from other providers who have treated the patient and records did not demonstrate any increased risk of substance abuse that would prevent me from prescribing controlled substances.     I have conducted a  physical exam and documented it. I have reviewed Ms. Martinez’s prescription history as maintained by the [place] Prescription Monitoring Program.   ORT 4, indicates moderate risk    I have assessed the patient’s risk for abuse, dependency, and addiction using the validated Opioid Risk Tool available at https://www.mdcalc.com/kezqgt-rpfm-mgil-ort-narcotic-abuse.     Given the above, I believe the benefits of controlled substance therapy outweigh the risks. The reasons for prescribing controlled substances include non-narcotic, oral analgesic alternatives have been inadequate for pain control and in my professional opinion, controlled substances are the only reasonable choice for this patient because her pain was note adequately controlled with alternatives and she has chronic progressive disease. Accordingly, I have discussed the risk and benefits, treatment plan, and alternative therapies with the patient.         Follow up: 4 weeks      Please note that this dictation was created using voice recognition software. I have made every reasonable attempt to correct obvious errors but there may be errors of grammar and content that I may have overlooked prior to finalization of this note.      Jayy Kerr MD  Interventional Spine and Sports Physiatry  Physical Medicine and Rehabilitation  [facility]

## 2020-03-11 ENCOUNTER — HOSPITAL ENCOUNTER (EMERGENCY)
Facility: MEDICAL CENTER | Age: 50
End: 2020-03-11
Payer: MEDICAID

## 2020-03-11 VITALS
WEIGHT: 118 LBS | DIASTOLIC BLOOD PRESSURE: 115 MMHG | HEART RATE: 119 BPM | SYSTOLIC BLOOD PRESSURE: 163 MMHG | RESPIRATION RATE: 16 BRPM | OXYGEN SATURATION: 98 % | BODY MASS INDEX: 20.9 KG/M2 | TEMPERATURE: 98.9 F

## 2020-03-11 PROCEDURE — 302449 STATCHG TRIAGE ONLY (STATISTIC)

## 2020-03-11 ASSESSMENT — FIBROSIS 4 INDEX: FIB4 SCORE: 0.65

## 2020-03-11 NOTE — ED NOTES
Patient agitated in lobby. Apologized for wait time. Patient states she wants to leave. Refused to sign AMA form. Escorted out via wheelchair with friend.

## 2020-03-11 NOTE — ED TRIAGE NOTES
Pt comes in complaining of R sided groin pain which radiates down her leg. Pt stating the pain started this morning and she can not tolerate the pain. Pt with good pedal pulse and good color to leg.

## 2020-03-17 ENCOUNTER — OFFICE VISIT (OUTPATIENT)
Dept: PHYSICAL MEDICINE AND REHAB | Facility: MEDICAL CENTER | Age: 50
End: 2020-03-17
Payer: MEDICAID

## 2020-03-17 VITALS
TEMPERATURE: 98.8 F | WEIGHT: 115.3 LBS | SYSTOLIC BLOOD PRESSURE: 110 MMHG | DIASTOLIC BLOOD PRESSURE: 80 MMHG | HEART RATE: 100 BPM | HEIGHT: 63 IN | BODY MASS INDEX: 20.43 KG/M2 | OXYGEN SATURATION: 96 %

## 2020-03-17 DIAGNOSIS — M05.79 RHEUMATOID ARTHRITIS INVOLVING MULTIPLE SITES WITH POSITIVE RHEUMATOID FACTOR (HCC): ICD-10-CM

## 2020-03-17 DIAGNOSIS — F32.A DEPRESSION, UNSPECIFIED DEPRESSION TYPE: ICD-10-CM

## 2020-03-17 DIAGNOSIS — M53.2X1 ATLANTOAXIAL INSTABILITY: ICD-10-CM

## 2020-03-17 DIAGNOSIS — Z78.9 IMPAIRED MOBILITY AND ADLS: ICD-10-CM

## 2020-03-17 DIAGNOSIS — Z74.09 IMPAIRED MOBILITY AND ADLS: ICD-10-CM

## 2020-03-17 DIAGNOSIS — M19.019 SHOULDER ARTHRITIS: ICD-10-CM

## 2020-03-17 DIAGNOSIS — S68.411S AMPUTATION OF RIGHT HAND, SEQUELA (HCC): ICD-10-CM

## 2020-03-17 DIAGNOSIS — F11.90 CHRONIC, CONTINUOUS USE OF OPIOIDS: ICD-10-CM

## 2020-03-17 PROCEDURE — 99214 OFFICE O/P EST MOD 30 MIN: CPT | Performed by: PHYSICAL MEDICINE & REHABILITATION

## 2020-03-17 RX ORDER — OXYCODONE AND ACETAMINOPHEN 7.5; 325 MG/1; MG/1
1 TABLET ORAL EVERY 4 HOURS PRN
Qty: 150 TAB | Refills: 0 | Status: SHIPPED | OUTPATIENT
Start: 2020-03-21 | End: 2020-04-20

## 2020-03-17 RX ORDER — TIOTROPIUM BROMIDE INHALATION SPRAY 1.56 UG/1
1 SPRAY, METERED RESPIRATORY (INHALATION) EVERY MORNING
Status: ON HOLD | COMMUNITY
Start: 2020-02-28 | End: 2021-02-12

## 2020-03-17 RX ORDER — OXYCODONE AND ACETAMINOPHEN 7.5; 325 MG/1; MG/1
1 TABLET ORAL EVERY 4 HOURS PRN
Qty: 150 TAB | Refills: 0 | Status: SHIPPED | OUTPATIENT
Start: 2020-05-20 | End: 2020-04-24

## 2020-03-17 RX ORDER — OXYCODONE AND ACETAMINOPHEN 7.5; 325 MG/1; MG/1
1 TABLET ORAL EVERY 4 HOURS PRN
Qty: 150 TAB | Refills: 0 | Status: SHIPPED | OUTPATIENT
Start: 2020-04-20 | End: 2020-05-20

## 2020-03-17 ASSESSMENT — PATIENT HEALTH QUESTIONNAIRE - PHQ9
5. POOR APPETITE OR OVEREATING: 2 - MORE THAN HALF THE DAYS
SUM OF ALL RESPONSES TO PHQ QUESTIONS 1-9: 12
CLINICAL INTERPRETATION OF PHQ2 SCORE: 2

## 2020-03-17 ASSESSMENT — PAIN SCALES - GENERAL: PAINLEVEL: 10=SEVERE PAIN

## 2020-03-17 ASSESSMENT — FIBROSIS 4 INDEX: FIB4 SCORE: 0.65

## 2020-03-17 NOTE — PROGRESS NOTES
Follow up patient note  Pain Medicine, Interventional spine and sports physiatry, Physical medicine rehabilitation      Chief complaint:   Joint pain      HISTORY    Please see new patient note dated 06/08/2018 by Dr Kerr,  for more details.     [facility]  Patient identification: Mary Martinez 49 y.o. female returns for follow-up of her pain related to rheumatoid arthritis.      Interval history:     Since the last visit, Mary has been having ongoing pain complaints related to her rheumatoid arthritis.  She has been advised to stay at home and is isolating from her son, who works at a hospital.      Her right knee pain was worse and she reports that she went to the ED on 03/11/2020, but refused to stay and left without being seen.  This is stable now, no change and related to her usual arthritis pain.    She continues to have shoulder, ankle, wrist and hand pain.  Washing her hair is difficult, continued difficulty with ADLs due to shoulder pain and limitation to ROM.    Her Rheumatologist is Dr. Dela Cruz.    She has discontinued gabapentin.  No significant change in pain.    She continues to follow-up with Psychiatry and reports that she has been doing well.  She now has a  dog.      PHQ-9: 12, denies suicidality  ORT: 4    ROS  Unchanged from previous visit 02/20/2020 except as noted in the HPI    Red Flags :   Fever, Chills, Sweats: Denies  Involuntary Weight Loss: Denies  Bowel/Bladder Incontinence: Denies      PMHx:   Past Medical History:   Diagnosis Date   • ASTHMA     inhalers prn   • Dental disorder     upper partial   • Pain     everywhere   • Psychiatric problem     anxiety   • Rheumatoid arthritis(714.0) 2003       PSHx:   Past Surgical History:   Procedure Laterality Date   • PB EXPLORATORY OF ABDOMEN N/A 10/4/2019    Procedure: LAPAROTOMY, EXPLORATORY AND REPAIR OF DUODENAL PERFORATION;  Surgeon: James Dumont M.D.;  Location: SURGERY [facility];  Service: General   • FINGER  ARTHROPLASTY Right 6/5/2017    Procedure: FINGER ARTHROPLASTY - LONG, RING AND SMALL VOLAR PLATE;  Surgeon: Lobo Rosen M.D.;  Location: SURGERY SAME DAY [facility];  Service:    • FINGER AMPUTATION  6/5/2017    Procedure: FINGER AMPUTATION - LONG, RING AND SMALL AT THE PROXIMAL INTERPHALANGEAL JOINT;  Surgeon: Lobo Rosen M.D.;  Location: SURGERY SAME DAY [facility];  Service:    • FINGER ARTHROPLASTY Right 4/17/2017    Procedure: FINGER ARTHROPLASTY ;  Surgeon: Lobo Rosen M.D.;  Location: SURGERY SAME DAY [facility];  Service:    • FINGER AMPUTATION Right 9/14/2016    Procedure: FINGER AMPUTATION INDEX;  Surgeon: Lobo Rosen M.D.;  Location: SURGERY SAME DAY [facility];  Service:    • IRRIGATION & DEBRIDEMENT ORTHO Right 9/11/2016    Procedure: IRRIGATION & DEBRIDEMENT ORTHO - right index finger;  Surgeon: Madhu Rosen M.D.;  Location: [facility];  Service:    • PIP ARTHRODESIS Right 8/29/2016    Procedure: RE-DO INDEX FINGER PROXIMAL INTERPHALANGEAL ARTHRODESIS;  Surgeon: Lobo Rosen M.D.;  Location: SURGERY SAME DAY [facility];  Service:    • BONE GRAFT Right 8/29/2016    Procedure: BONE GRAFT - DISTAL RADIUS ;  Surgeon: Lobo Rosen M.D.;  Location: SURGERY SAME DAY [facility];  Service:    • ARTHRODESIS Right 5/6/2016    Procedure: ARTHRODESIS INDEX FINGER PROXIMAL INTERPHALANGEAL;  Surgeon: Lobo Rosen M.D.;  Location: SURGERY SAME DAY [facility];  Service:    • FOOT RECONSTRUCTION RHEUMATIC Left 7/29/2015    Procedure: FOOT RECONSTRUCTION RHEUMATIC;  Surgeon: Heriberto Alves M.D.;  Location: [facility];  Service:    • TOE FUSION Right 5/27/2015    Procedure: TOE FUSION 1ST METATARSALPHALANGEAL JOINT;  Surgeon: Heriberto Alves M.D.;  Location: [facility];  Service:    • BONE SPUR EXCISION  5/27/2015    Procedure: BONE SPUR EXCISION METATARSAL HEAD 2-3;  Surgeon: Heriberto Alves M.D.;   Location: SURGERY [facility];  Service:    • HAMMERTOE CORRECTION  2015    Procedure: HAMMERTOE CORRECTION AND SOFT TISSUE RE-ALINGMENT 2-3 ;  Surgeon: Heriberto Alves M.D.;  Location: SURGERY [facility];  Service:    • EMANUEL BY LAPAROSCOPY  2011    Performed by VERO WRIGHT at SURGERY [facility]   • ABDOMINAL ABSCESS DRAINAGE  2011    Performed by VERO WRIGHT at SURGERY [facility]   • OTHER  1982    tonsils   • APPENDECTOMY     • CHOLECYSTECTOMY     • GYN SURGERY      C section X 4   • OTHER ABDOMINAL SURGERY      small colon block   • PB  DELIVERY ONLY      x 4   • TONSILLECTOMY     • WRIST ORIF         Family history   History reviewed. No pertinent family history.      Medications:   Outpatient Medications Marked as Taking for the 3/17/20 encounter (Office Visit) with Jayy Kerr M.D.   Medication Sig Dispense Refill   • [START ON 3/21/2020] oxyCODONE-acetaminophen (PERCOCET) 7.5-325 MG per tablet Take 1 Tab by mouth every four hours as needed for Severe Pain for up to 30 days. 150 Tab 0   • [START ON 2020] oxyCODONE-acetaminophen (PERCOCET) 7.5-325 MG per tablet Take 1 Tab by mouth every four hours as needed for up to 30 days. 150 Tab 0   • [START ON 2020] oxyCODONE-acetaminophen (PERCOCET) 7.5-325 MG per tablet Take 1 Tab by mouth every four hours as needed for up to 30 days. 150 Tab 0   • QUEtiapine (SEROQUEL) 100 MG Tab Take 100 mg by mouth every evening.     • PARoxetine (PAXIL) 30 MG Tab Take 60 mg by mouth every evening.     • albuterol (VENTOLIN OR PROVENTIL) 108 (90 BASE) MCG/ACT AERS inhalation aerosol Inhale 2 Puffs by mouth every four hours as needed for Shortness of Breath.         Allergies:   Allergies   Allergen Reactions   • Penicillins Anaphylaxis and Hives     Tolerates cephalosporins; reports throat swelling with PCN   • Nitrous Oxide Vomiting   • Aripiprazole [Abilify] Nausea     Spasms, shaking       Social Hx:   Social  History     Socioeconomic History   • Marital status:      Spouse name: Not on file   • Number of children: Not on file   • Years of education: Not on file   • Highest education level: Not on file   Occupational History   • Not on file   Social Needs   • Financial resource strain: Not on file   • Food insecurity     Worry: Not on file     Inability: Not on file   • Transportation needs     Medical: Not on file     Non-medical: Not on file   Tobacco Use   • Smoking status: Current Every Day Smoker     Packs/day: 1.00     Years: 30.00     Pack years: 30.00     Types: Cigarettes   • Smokeless tobacco: Never Used   • Tobacco comment: 1 ppd   Substance and Sexual Activity   • Alcohol use: No   • Drug use: No   • Sexual activity: Not on file   Lifestyle   • Physical activity     Days per week: Not on file     Minutes per session: Not on file   • Stress: Not on file   Relationships   • Social connections     Talks on phone: Not on file     Gets together: Not on file     Attends Jewish service: Not on file     Active member of club or organization: Not on file     Attends meetings of clubs or organizations: Not on file     Relationship status: Not on file   • Intimate partner violence     Fear of current or ex partner: Not on file     Emotionally abused: Not on file     Physically abused: Not on file     Forced sexual activity: Not on file   Other Topics Concern   •  Service No   • Blood Transfusions Yes   • Caffeine Concern No   • Occupational Exposure No   • Hobby Hazards No   • Sleep Concern No   • Stress Concern Yes   • Weight Concern No   • Special Diet No   • Back Care No   • Exercise Yes   • Bike Helmet Yes   • Seat Belt Yes   • Self-Exams Yes   Social History Narrative    ** Merged History Encounter **              EXAMINATION     Physical Exam:   Vitals: /80 (BP Location: Left arm, Patient Position: Sitting, BP Cuff Size: Large adult long)   Pulse 100   Temp 37.1 °C (98.8 °F) (Temporal)    "Ht 1.6 m (5' 3\")   Wt 52.3 kg (115 lb 4.8 oz)   SpO2 96%     Constitutional:   Body Habitus: Body mass index is 20.42 kg/m².  Cooperation: Fully cooperates with exam  Appearance: Well-groomed no disheveled, in no acute distress  Respiratory-  breathing comfortable on room air, no audible wheezing  Cardiovascular-  No lower extremity edema is observed  Psychiatric- alert and oriented ×3. Normal affect.     Musculoskeletal:    Left and right shoulder pain with abduction past 50 degrees abduction.      Partial hand amputation on the right at the MCPs; ulnar deviation on the left with MCP subluxation, mild atrophy of the hand intrinsics with wasting of the thenar eminence.   Nontender cyst like structure over the dorsum of the left wrist.  Negative compression test on the left wrist.      MEDICAL DECISION MAKING    DATA    Labs: UDS 10/30/2018 consistent with medications.  Oxycodone and metabolites without other substances   UDS 04/19/2019 consistent with medications. Oxycodone and metabolites without other substances   UDS 07/19/2019 consistent with medications.  Oxycodone and metabolites without other substances   UDS 11/22/2019 consistent with medications.  Oxycodone and metabolites without other substances   UDS 02/20/2020 absent for Oxycodone and metabolites without other substances    Imaging:    MRI cervical spine 07/31/2018:  Films reviewed.  These are my reads:  There is is note of motion at the atlantodental level with 5mm atlantodental inverval in flexion that reduces to 1-2 mm in extension.  Mild retrolisthesis of C4 on C5 and anterolisthesis of C5- on C6.  Disc extrusion at C6-7 with mild central canal stenosis    Xray left shoulder 2/5/2018 Humeral head is elevated.  Glenohumeral joint arthritis.    Reports reviewed:  Xray cervical spine 11/14/2018  1. No acute fracture  2. Persistent motion at the C1-2 joint as before, suggesting atlantoaxial instability  3. Minimal instability noted at C5-6 level as " described.    MRI cervical spine 07/31/2018  1. Widening of the atlantodental interal in neutral and flexion positions with a 5mm space which reduces to 1-2 mm in extension  2. 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 Rheumatoid arthritis involving multiple sites with positive rheumatoid factor ([facility]) M05.79   2. 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 Shoulder arthritis M19.019   4. Amputation of right hand, sequela ([facility]) S68.411S   5. Chronic, continuous use of opioids F11.90   6. Depression, unspecified depression type F32.9   7. Impaired mobility and ADLs Z74.09         ASSESSMENT and PLAN:     Mary Martinez 49 y.o. female with rheumatoid arthritis    Mary was seen today for follow-up.    Orders and management for this visit:    1.Discussed that she is going to continue to socially isolate for now.  2. Continue with percocet 7.5/325 #150 per month.  Refills given for future fill for the next two months.  3. Continue diclofenac gel for shoulders.  Discussed using 2g up to four times a day.  She will allow the gel to dry for 10 minutes prior to covering.  4. Continue to follow-up with Dr. Dela Cruz  5. Continue follow-up with Psychology and Psychiatry.  She denies suicidality currently.  6. Continue to follow-up with GI and PCP.  7. UDS reviewed today, plan to repeat at next visit, she had reported being out of her percocet early at that time, related to worsened pain and we had discussed that .   reviewed.    In prescribing controlled substances to this patient, I certify that I have obtained and reviewed the medical history of Mary Martinez. I have also made a good aristides effort to obtain applicable records from other providers who have treated the patient and  records did not demonstrate any increased risk of substance abuse that would prevent me from prescribing controlled substances.     I have conducted a physical exam and documented it. I have reviewed Ms. Martinez’s prescription history as maintained by the [place] Prescription Monitoring Program.   ORT 4, indicates moderate risk    I have assessed the patient’s risk for abuse, dependency, and addiction using the validated Opioid Risk Tool available at https://www.[facility]ori.com/hvrsyh-cbxa-iint-ort-narcotic-abuse.     Given the above, I believe the benefits of controlled substance therapy outweigh the risks. The reasons for prescribing controlled substances include non-narcotic, oral analgesic alternatives have been inadequate for pain control and in my professional opinion, controlled substances are the only reasonable choice for this patient because her pain was note adequately controlled with alternatives and she has chronic progressive disease. Accordingly, I have discussed the risk and benefits, treatment plan, and alternative therapies with the patient.         Follow up: 12 weeks, prn      Please note that this dictation was created using voice recognition software. I have made every reasonable attempt to correct obvious errors but there may be errors of grammar and content that I may have overlooked prior to finalization of this note.      Jayy Kerr MD  Interventional Spine and Sports Physiatry  Physical Medicine and Rehabilitation  [facility]

## 2020-04-24 RX ORDER — FAMOTIDINE 40 MG/1
40 TABLET, FILM COATED ORAL
Status: ON HOLD | COMMUNITY
Start: 2020-04-15 | End: 2021-02-12

## 2020-04-24 SDOH — HEALTH STABILITY: MENTAL HEALTH: HOW OFTEN DO YOU HAVE 6 OR MORE DRINKS ON ONE OCCASION?: NEVER

## 2020-04-24 SDOH — HEALTH STABILITY: MENTAL HEALTH: HOW OFTEN DO YOU HAVE A DRINK CONTAINING ALCOHOL?: NEVER

## 2020-04-24 NOTE — OR NURSING
Pre-admit appointment completed.  Pt instructed to continue regularly prescribed medications through the day before surgery. Pt instructed to take the following medications the day of surgery with a sip of water, per anesthesia protocol; spiriva, and if needed-albuterol MDI, zofran, percocet.    Pt to bring MDI DOS.     Pt states her rheumatologist prescribed prednisone but she has not started yet. She will verify with GI doctor if OK to start. If pt does start prednisone, she will take her dose DOS.

## 2020-04-29 ENCOUNTER — ANESTHESIA (OUTPATIENT)
Dept: SURGERY | Facility: MEDICAL CENTER | Age: 50
End: 2020-04-29
Payer: MEDICAID

## 2020-04-29 ENCOUNTER — ANESTHESIA EVENT (OUTPATIENT)
Dept: SURGERY | Facility: MEDICAL CENTER | Age: 50
End: 2020-04-29
Payer: MEDICAID

## 2020-04-29 ENCOUNTER — HOSPITAL ENCOUNTER (OUTPATIENT)
Facility: MEDICAL CENTER | Age: 50
End: 2020-04-29
Attending: INTERNAL MEDICINE | Admitting: INTERNAL MEDICINE
Payer: MEDICAID

## 2020-04-29 VITALS
TEMPERATURE: 99 F | WEIGHT: 115.08 LBS | HEART RATE: 94 BPM | OXYGEN SATURATION: 96 % | DIASTOLIC BLOOD PRESSURE: 68 MMHG | BODY MASS INDEX: 20.39 KG/M2 | SYSTOLIC BLOOD PRESSURE: 103 MMHG | HEIGHT: 63 IN | RESPIRATION RATE: 18 BRPM

## 2020-04-29 LAB — PATHOLOGY CONSULT NOTE: NORMAL

## 2020-04-29 PROCEDURE — 88305 TISSUE EXAM BY PATHOLOGIST: CPT

## 2020-04-29 PROCEDURE — 160002 HCHG RECOVERY MINUTES (STAT): Performed by: INTERNAL MEDICINE

## 2020-04-29 PROCEDURE — 160035 HCHG PACU - 1ST 60 MINS PHASE I: Performed by: INTERNAL MEDICINE

## 2020-04-29 PROCEDURE — 160203 HCHG ENDO MINUTES - 1ST 30 MINS LEVEL 4: Performed by: INTERNAL MEDICINE

## 2020-04-29 PROCEDURE — 160009 HCHG ANES TIME/MIN: Performed by: INTERNAL MEDICINE

## 2020-04-29 PROCEDURE — 160025 RECOVERY II MINUTES (STATS): Performed by: INTERNAL MEDICINE

## 2020-04-29 PROCEDURE — 700111 HCHG RX REV CODE 636 W/ 250 OVERRIDE (IP): Performed by: ANESTHESIOLOGY

## 2020-04-29 PROCEDURE — 700105 HCHG RX REV CODE 258: Performed by: INTERNAL MEDICINE

## 2020-04-29 PROCEDURE — 700102 HCHG RX REV CODE 250 W/ 637 OVERRIDE(OP): Performed by: INTERNAL MEDICINE

## 2020-04-29 PROCEDURE — 700101 HCHG RX REV CODE 250: Performed by: ANESTHESIOLOGY

## 2020-04-29 PROCEDURE — 160046 HCHG PACU - 1ST 60 MINS PHASE II: Performed by: INTERNAL MEDICINE

## 2020-04-29 PROCEDURE — A9270 NON-COVERED ITEM OR SERVICE: HCPCS | Performed by: INTERNAL MEDICINE

## 2020-04-29 PROCEDURE — 160048 HCHG OR STATISTICAL LEVEL 1-5: Performed by: INTERNAL MEDICINE

## 2020-04-29 PROCEDURE — 500066 HCHG BITE BLOCK, ECT: Performed by: INTERNAL MEDICINE

## 2020-04-29 PROCEDURE — 160208 HCHG ENDO MINUTES - EA ADDL 1 MIN LEVEL 4: Performed by: INTERNAL MEDICINE

## 2020-04-29 PROCEDURE — 88312 SPECIAL STAINS GROUP 1: CPT

## 2020-04-29 RX ORDER — SODIUM CHLORIDE, SODIUM LACTATE, POTASSIUM CHLORIDE, CALCIUM CHLORIDE 600; 310; 30; 20 MG/100ML; MG/100ML; MG/100ML; MG/100ML
INJECTION, SOLUTION INTRAVENOUS CONTINUOUS
Status: DISCONTINUED | OUTPATIENT
Start: 2020-04-29 | End: 2020-04-29 | Stop reason: HOSPADM

## 2020-04-29 RX ORDER — OXYCODONE HCL 5 MG/5 ML
10 SOLUTION, ORAL ORAL
Status: DISCONTINUED | OUTPATIENT
Start: 2020-04-29 | End: 2020-04-29 | Stop reason: HOSPADM

## 2020-04-29 RX ORDER — METOCLOPRAMIDE HYDROCHLORIDE 5 MG/ML
INJECTION INTRAMUSCULAR; INTRAVENOUS PRN
Status: DISCONTINUED | OUTPATIENT
Start: 2020-04-29 | End: 2020-04-29 | Stop reason: SURG

## 2020-04-29 RX ORDER — PREDNISONE 10 MG/1
10 TABLET ORAL DAILY
Status: ON HOLD | COMMUNITY
Start: 2020-04-22 | End: 2021-02-12

## 2020-04-29 RX ORDER — ROCURONIUM BROMIDE 10 MG/ML
INJECTION, SOLUTION INTRAVENOUS PRN
Status: DISCONTINUED | OUTPATIENT
Start: 2020-04-29 | End: 2020-04-29 | Stop reason: SURG

## 2020-04-29 RX ORDER — DIPHENHYDRAMINE HYDROCHLORIDE 50 MG/ML
12.5 INJECTION INTRAMUSCULAR; INTRAVENOUS
Status: DISCONTINUED | OUTPATIENT
Start: 2020-04-29 | End: 2020-04-29 | Stop reason: HOSPADM

## 2020-04-29 RX ORDER — OXYCODONE HCL 5 MG/5 ML
5 SOLUTION, ORAL ORAL
Status: DISCONTINUED | OUTPATIENT
Start: 2020-04-29 | End: 2020-04-29 | Stop reason: HOSPADM

## 2020-04-29 RX ORDER — DEXAMETHASONE SODIUM PHOSPHATE 4 MG/ML
INJECTION, SOLUTION INTRA-ARTICULAR; INTRALESIONAL; INTRAMUSCULAR; INTRAVENOUS; SOFT TISSUE PRN
Status: DISCONTINUED | OUTPATIENT
Start: 2020-04-29 | End: 2020-04-29 | Stop reason: SURG

## 2020-04-29 RX ORDER — HYDRALAZINE HYDROCHLORIDE 20 MG/ML
5 INJECTION INTRAMUSCULAR; INTRAVENOUS
Status: DISCONTINUED | OUTPATIENT
Start: 2020-04-29 | End: 2020-04-29 | Stop reason: HOSPADM

## 2020-04-29 RX ORDER — LABETALOL HYDROCHLORIDE 5 MG/ML
5 INJECTION, SOLUTION INTRAVENOUS
Status: DISCONTINUED | OUTPATIENT
Start: 2020-04-29 | End: 2020-04-29 | Stop reason: HOSPADM

## 2020-04-29 RX ORDER — METOPROLOL TARTRATE 1 MG/ML
INJECTION, SOLUTION INTRAVENOUS PRN
Status: DISCONTINUED | OUTPATIENT
Start: 2020-04-29 | End: 2020-04-29 | Stop reason: SURG

## 2020-04-29 RX ORDER — ONDANSETRON 2 MG/ML
INJECTION INTRAMUSCULAR; INTRAVENOUS PRN
Status: DISCONTINUED | OUTPATIENT
Start: 2020-04-29 | End: 2020-04-29 | Stop reason: SURG

## 2020-04-29 RX ORDER — MEPERIDINE HYDROCHLORIDE 25 MG/ML
12.5 INJECTION INTRAMUSCULAR; INTRAVENOUS; SUBCUTANEOUS
Status: DISCONTINUED | OUTPATIENT
Start: 2020-04-29 | End: 2020-04-29 | Stop reason: HOSPADM

## 2020-04-29 RX ORDER — METOPROLOL TARTRATE 1 MG/ML
1 INJECTION, SOLUTION INTRAVENOUS
Status: DISCONTINUED | OUTPATIENT
Start: 2020-04-29 | End: 2020-04-29 | Stop reason: HOSPADM

## 2020-04-29 RX ADMIN — EPHEDRINE SULFATE 10 MG: 50 INJECTION INTRAMUSCULAR; INTRAVENOUS; SUBCUTANEOUS at 09:29

## 2020-04-29 RX ADMIN — ROCURONIUM BROMIDE 50 MG: 10 INJECTION, SOLUTION INTRAVENOUS at 09:25

## 2020-04-29 RX ADMIN — SUGAMMADEX 200 MG: 100 INJECTION, SOLUTION INTRAVENOUS at 09:52

## 2020-04-29 RX ADMIN — METOPROLOL TARTRATE 1 MG: 5 INJECTION INTRAVENOUS at 09:52

## 2020-04-29 RX ADMIN — SODIUM CHLORIDE, POTASSIUM CHLORIDE, SODIUM LACTATE AND CALCIUM CHLORIDE: 600; 310; 30; 20 INJECTION, SOLUTION INTRAVENOUS at 09:54

## 2020-04-29 RX ADMIN — POVIDONE-IODINE 15 ML: 10 SOLUTION TOPICAL at 07:43

## 2020-04-29 RX ADMIN — MEPERIDINE HYDROCHLORIDE 12.5 MG: 25 INJECTION INTRAMUSCULAR; INTRAVENOUS; SUBCUTANEOUS at 10:09

## 2020-04-29 RX ADMIN — FENTANYL CITRATE 100 MCG: 50 INJECTION, SOLUTION INTRAMUSCULAR; INTRAVENOUS at 09:23

## 2020-04-29 RX ADMIN — METOCLOPRAMIDE 10 MG: 5 INJECTION, SOLUTION INTRAMUSCULAR; INTRAVENOUS at 09:25

## 2020-04-29 RX ADMIN — SODIUM CHLORIDE, POTASSIUM CHLORIDE, SODIUM LACTATE AND CALCIUM CHLORIDE: 600; 310; 30; 20 INJECTION, SOLUTION INTRAVENOUS at 07:45

## 2020-04-29 RX ADMIN — DEXAMETHASONE SODIUM PHOSPHATE 8 MG: 4 INJECTION, SOLUTION INTRAMUSCULAR; INTRAVENOUS at 09:25

## 2020-04-29 RX ADMIN — ONDANSETRON 4 MG: 2 INJECTION INTRAMUSCULAR; INTRAVENOUS at 09:52

## 2020-04-29 RX ADMIN — EPHEDRINE SULFATE 5 MG: 50 INJECTION INTRAMUSCULAR; INTRAVENOUS; SUBCUTANEOUS at 09:40

## 2020-04-29 RX ADMIN — PROPOFOL 120 MG: 10 INJECTION, EMULSION INTRAVENOUS at 09:25

## 2020-04-29 ASSESSMENT — PAIN SCALES - GENERAL: PAIN_LEVEL: 0

## 2020-04-29 ASSESSMENT — FIBROSIS 4 INDEX: FIB4 SCORE: 0.66

## 2020-04-29 NOTE — OR NURSING
0704: Brought patient back to pre-op and assumed care.  0726: Patient allergies and NPO status verified, home medication reconciliation completed and belongings secured. Patient verbalizes understanding of pain scale, expected course of stay and plan of care. Surgical site verified with patient. IV access established. Sequentials placed on legs.

## 2020-04-29 NOTE — ANESTHESIA TIME REPORT
Anesthesia Start and Stop Event Times     Date Time Event    4/29/2020 0910 Ready for Procedure     0919 Anesthesia Start        Responsible Staff  04/29/20    Name Role Begin End    René Bonner M.D. Anesth 0919         Preop Diagnosis (Free Text):  Pre-op Diagnosis     ABNORMAL IMAGING ABDOMEN - DILATED PD AND BILE DUCT/WEIGHT LOSS        Preop Diagnosis (Codes):  Diagnosis Information     Diagnosis Code(s): Abnormal findings on diagnostic imaging of abdomen [R93.5]        Post op Diagnosis  Abnormal findings on diagnostic imaging of abdomen      Premium Reason  Non-Premium    Comments:

## 2020-04-29 NOTE — ANESTHESIA PREPROCEDURE EVALUATION
Relevant Problems   CARDIAC   (+) Rheumatoid aortitis         (+) Acute renal failure (HCC)      Other   (+) Arthritis, rheumatoid (HCC)   (+) Rheumatoid arthritis (HCC)       Physical Exam    Anesthesia Plan    ASA 3   ASA physical status 3 criteria: other (comment)    Plan - general       Airway plan will be ETT  (H/o pancreatitis, asthma,  R.A.)      Induction: intravenous    Postoperative Plan: Postoperative administration of opioids is intended.    Pertinent diagnostic labs and testing reviewed    Informed Consent:    Anesthetic plan and risks discussed with patient.    Use of blood products discussed with: patient whom consented to blood products.

## 2020-04-29 NOTE — ANESTHESIA PROCEDURE NOTES
Airway  Date/Time: 4/29/2020 9:28 AM  Performed by: René Bonner M.D.  Authorized by: René Bonner M.D.     Location:  OR  Urgency:  Elective  Indications for Airway Management:  Anesthesia      Spontaneous Ventilation: absent    Sedation Level:  Deep  Preoxygenated: Yes    Patient Position:  Sniffing  Mask Difficulty Assessment:  0 - not attempted  Final Airway Type:  Endotracheal airway  Final Endotracheal Airway:  ETT  Cuffed: Yes    Technique Used for Successful ETT Placement:  Direct laryngoscopy  Insertion Site:  Oral  Blade Type:  [place]  Laryngoscope Blade/Videolaryngoscope Blade Size:  2  ETT Size (mm):  7.0  Measured from:  Teeth  ETT to Teeth (cm):  20  Placement Verified by: auscultation and capnometry    Cormack-Lehane Classification:  Grade I - full view of glottis  Number of Attempts at Approach:  1

## 2020-04-29 NOTE — PROCEDURES
DATE OF SERVICE:  04/29/2020    PROCEDURES:  1.  Esophagogastroduodenoscopy with biopsy.  2.  Endoscopic ultrasound, upper.    INDICATION:  Abnormal imaging, dilated common bile duct and pancreatic duct.    FINAL IMPRESSION:  EGD FINDINGS:  1.  Esophagus, unremarkable proximal mucosa.  2.  Esophagitis at the GE junction, [place] grade A.  Biopsied.  3.  Stomach, 2 cm hiatal hernia.  4.  Gastric antrum inflammation with erythema and mosaic mucosal pattern,   biopsied.  5.  Duodenum, unremarkable mucosa, biopsied to rule out celiac sprue.  6.  Biliary ampulla unremarkable.    ENDOSCOPIC ULTRASOUND FINDINGS:  1.  Celiac axis, no adenopathy.  2.  Pancreatic body and tail, normal parenchyma throughout.  3.  Pancreatic duct, normal course.  Generous caliber throughout.  Measuring   1.5 mm in the tail.  4.  Head of pancreas, normal dorsal ventral transition.  Unremarkable   parenchyma.  5.  Biliary ampulla unremarkable visually and endosonographically.  6.  Common bile duct, normal course.  Generous caliber in the mid duct.  No   filling defects appreciated.    RECOMMENDATIONS:  1.  Follow liver enzymes for evidence of obstruction.  2.  No further workup for pancreatic/bile duct dilation recommended.    DESCRIPTION OF PROCEDURE:  Prior to the procedure, physical exam was stable.    During procedure, vital signs remained within normal limits.  Prior to   sedation, informed consent was obtained.  Risks, benefits, alternatives   including but not limited to risk of bleeding, infection, perforation, adverse   reaction to medication, failure to identify pathology, pancreatitis, and   death explained to the patient at length.  She accepted all risks.  The   patient prepped in the left lateral position after intubation and sedation by   anesthesia.    EGD:  Scope tip of the Olympus flexible gastroscope passed into the proximal   esophagus.  Proximal, middle, and distal thirds of the esophagus were well   visualized.  The  mucosa was normal throughout up until the GE junction.  At 37   cm, there were short areas of erosive esophagitis consistent with [place]   grade A erosive esophagitis.  Biopsies were taken.  Stomach was entered.    There was a hiatal hernia from 37 to 39 cm.  Gastric pool was suctioned, air   was insufflated.  Scope retroflexed to view the body, fundus, and cardia,   which all appeared unremarkable with the exception of the hiatal hernia.    Scope straightened to view the antrum and incisura.  There was mild erythema   and mosaic mucosal pattern throughout.  Biopsies were taken to rule out H.   pylori.  Stomach was slightly large and J-shaped.  The pylorus was patent.    Duodenal bulb, sweep, second and third portions were well visualized.    Biopsies were taken throughout to rule out celiac sprue.  The biliary ampulla   was seen tangentially and was unremarkable with no suspicion of mass.  The   scope was withdrawn.  Air and liquid were suctioned and we proceeded with   endoscopic ultrasound.    EUS:  A flexible radial echoendoscope passed into the proximal stomach.    Imaging of the celiac axis was unremarkable with no adenopathy.  Imaging of   the pancreatic body and tail demonstrated a very normal-appearing gland with   normal parenchyma throughout.  The pancreatic duct itself was generous in   caliber through to the tail measuring 3.5 mm in the body and 1.5 mm in the   tail.  It had a normal course and no filling defects.  The borders were not   excessively hyperechoic.  The scope was advanced in the duodenum.  The biliary   ampulla was endosonographically and visually unremarkable.  The head of the   pancreas showed unremarkable parenchyma and a normal dorsal ventral transition   with no evidence of mass.  The common bile duct and pancreatic duct were   followed from the level of the ampulla, arguing against pancreas divisum.  The   common bile duct had a normal course with no filling defects, but was    slightly generous caliber in the mid duct, but this was within the range of   normal following cholecystectomy.  No other pathology was seen.  Multiple   passes were made through the area of the head of the pancreas and the biliary   ampulla with the same findings.  Procedure was deemed complete.  The scope was   withdrawn.  Air and liquid were suctioned.  The patient tolerated the   procedure well and was sent to recovery without immediate complications.       ____________________________________     MONICA MENEZES MD JPP / NTS    DD:  04/29/2020 10:04:39  DT:  04/29/2020 11:37:08    D#:  [id number]  Job#:  [id number]

## 2020-04-29 NOTE — DISCHARGE INSTRUCTIONS
ENDOSCOPY HOME CARE INSTRUCTIONS    GASTROSCOPY OR ERCP  1. Don't eat or drink anything for about an hour after the test. You can then resume your regular diet.  2. Don't drive or drink alcohol for 24 hours. The medication you received will make you too drowsy.  3. Don't take any coffee, tea, or aspirin products until after you see your doctor. These can harm the lining of your stomach.  4. If you begin to vomit bloody material, or develop black or bloody stools, call your doctor as soon as possible.  5. If you have any neck, chest, abdominal pain or temp of 100 degrees, call your doctor.    Do not drive today  Resume regular diet    You should call 911 if you develop problems with breathing or chest pain.  If any questions arise, call your doctor. If your doctor is not available, please feel free to call [phone].     You can also call the [employer] HOTLINE open 24 hours/day, 7 days/week and speak to a nurse at [phone], or toll free [phone].    Immediate Post OP Note     PreOp Diagnosis: Abnormal imaging, dilated PD/CBD     PostOp Diagnosis: Same     Procedure(s):  GASTROSCOPY - Wound Class: Clean Contaminated  EGD, WITH ENDOSCOPIC US - Wound Class: Clean Contaminated  EGD, WITH ASPIRATION BIOPSY - POSS FNA - Wound Class: Clean Contaminated     Surgeon(s):  Jorge Brooke M.D.     Anesthesiologist/Type of Anesthesia:  Anesthesiologist: René Bonner M.D./* No anesthesia type entered *      Specimens removed if any:  ID Type Source Tests Collected by Time Destination   A :  Tissue Duodenum PATHOLOGY SPECIMEN Jorge Brooke M.D. 4/29/2020  9:33 AM     B :  Tissue Gastric PATHOLOGY SPECIMEN Jorge Brooke M.D. 4/29/2020  9:36 AM     C : GE junction bx Tissue Esophagus PATHOLOGY SPECIMEN Jorge Brooke M.D. 4/29/2020  9:36 AM           Dr. Brooke  [facility]  [facility]  [phone]     EGD with:        Biopsy     EUS Upper     Indication:        Abnormal imaging,  dilated CBD/PD     Sedation:         GA (Kasprzak)     Findings:              EGD                          Esophagus                                      Unremarkable proximal mucosa                                      Esophagitis                                                  At GE junction at 37cm                                                  LA grade A                                                  Biopsied                          Stomach                                      Hiatal hernia                                                  37-39cm                                      Antrum inflammation                                                  Erythema with mosaic mucosal pattern                                                  Biopsied                          Duodenum                                      Unremarkable mucosa                                      Biopsied - rule out celiac sprue                          Ampulla                                      Unremarkable                 EUS                          Celiac axis                                      No adenopathy                          Pancreas body/tail                                      Normal parenchyma throughout                          Pancreatic duct                                      Normal course                                      Generous caliber throughout                                      3.5mm in body, 1.5mm in tail                          Head of pancreas                                      Normal dorsal / ventral transition                                      Unremarkable parenchyma                          Ampulla                                      Unremarkable                          CBD                                      Normal course                                      Generous caliber in mid-duct                                      No filling defects     Plan:                Follow liver  enzymes                          No further workup for pancreas/bile duct dilation      Depression / Suicide Risk    As you are discharged from this [facility] facility, it is important to learn how to keep safe from harming yourself.    Recognize the warning signs:  · Abrupt changes in personality, positive or negative- including increase in energy   · Giving away possessions  · Change in eating patterns- significant weight changes-  positive or negative  · Change in sleeping patterns- unable to sleep or sleeping all the time   · Unwillingness or inability to communicate  · Depression  · Unusual sadness, discouragement and loneliness  · Talk of wanting to die  · Neglect of personal appearance   · Rebelliousness- reckless behavior  · Withdrawal from people/activities they love  · Confusion- inability to concentrate     If you or a loved one observes any of these behaviors or has concerns about self-harm, here's what you can do:  · Talk about it- your feelings and reasons for harming yourself  · Remove any means that you might use to hurt yourself (examples: pills, rope, extension cords, firearm)  · Get professional help from the community (Mental Health, Substance Abuse, psychological counseling)  · Do not be alone:Call your Safe Contact- someone whom you trust who will be there for you.  · Call your local CRISIS HOTLINE 969-2660 or 689-904-1852  · Call your local Children's Mobile Crisis Response Team [place] (193) 669-2672 or www."[employer]  · Call the toll free National Suicide Prevention Hotlines   · National Suicide Prevention Lifeline 516-502-JHSA (8332)  · National Hope Line Network 800-SUICIDE (744-3448)    I acknowledge receipt and understanding of these Home Care Instructions.

## 2020-04-29 NOTE — OR NURSING
1036: Patient arrived from PACU Phase 1, Patient is awake and alert, pain and nausea free.    1105: D/Nick to care of family post uneventful stay in PACU 2.

## 2020-04-29 NOTE — ANESTHESIA QCDR
2019 [facility] Clinical Data Registry (for Quality Improvement)     Postoperative nausea/vomiting risk protocol (Adult = 18 yrs and Pediatric 3-17 yrs)- (430 and 463)  General inhalation anesthetic (NOT TIVA) with PONV risk factors: Yes  Provision of anti-emetic therapy with at least 2 different classes of agents: Yes   Patient DID NOT receive anti-emetic therapy and reason is documented in Medical Record:  N/A    Multimodal Pain Management- (477)  Non-emergent surgery AND patient age >= 18: Yes  Use of Multimodal Pain Management, two or more drugs and/or interventions, NOT including systemic opioids: No  Exception: Documented allergy to multiple classes of analgesics: No       Smoking Abstinence (404)  Patient is current smoker (cigarette, pipe, e-cig, marijuanna): No  Elective Surgery:   Abstinence instructions provided prior to day of surgery:   Patient abstained from smoking on day of surgery:     Pre-Op Beta-Blocker in Isolated CABG (44)  Isolated CABG AND patient age >= 18: No  Beta-blocker admin within 24 hours of surgical incision:   Exception:of medical reason(s) for not administering beta blocker within 24 hours prior to surgical incision (e.g., not  indicated,other medical reason):     PACU assessment of acute postoperative pain prior to Anesthesia Care End- Applies to Patients Age = 18- (ABG7)  Initial PACU pain score is which of the following: < 7/10  Patient unable to report pain score: N/A    Post-anesthetic transfer of care checklist/protocol to PACU/ICU- (426 and 427)  Upon conclusion of case, patient transferred to which of the following locations: PACU/Non-ICU  Use of transfer checklist/protocol: Yes  Exclusion: Service Performed in Patient Hospital Room (and thus did not require transfer): N/A  Unplanned admission to ICU related to anesthesia service up through end of PACU care- (MD51)  Unplanned admission to ICU (not initially anticipated at anesthesia start time): No

## 2020-04-29 NOTE — ANESTHESIA POSTPROCEDURE EVALUATION
Patient: Mary Martinez    Procedure Summary     Date:  04/29/20 Room / Location:   ENDOSCOPIC ULTRASOUND ROOM / SURGERY [facility]    Anesthesia Start:  0919 Anesthesia Stop:  1004    Procedures:       GASTROSCOPY      EGD, WITH ENDOSCOPIC US      EGD, WITH ASPIRATION BIOPSY - POSS FNA Diagnosis:       Abnormal findings on diagnostic imaging of abdomen      (ABNORMAL IMAGING ABDOMEN)    Surgeon:  Jorge Brooke M.D. Responsible Provider:  René Bonner M.D.    Anesthesia Type:  general ASA Status:  3          Final Anesthesia Type: general  Last vitals  BP   Blood Pressure: (!) 94/61    Temp   37.7 °C (99.9 °F)    Pulse   Pulse: 90   Resp   18    SpO2   96 %      Anesthesia Post Evaluation    Patient location during evaluation: PACU  Patient participation: complete - patient participated  Level of consciousness: awake and alert  Pain score: 0    Airway patency: patent  Anesthetic complications: no  Cardiovascular status: hemodynamically stable  Respiratory status: acceptable  Hydration status: euvolemic    PONV: none           Nurse Pain Score: 0 (NPRS)

## 2020-04-29 NOTE — ANESTHESIA PREPROCEDURE EVALUATION
Relevant Problems   CARDIAC   (+) Rheumatoid aortitis         (+) Acute renal failure (HCC)      Other   (+) Arthritis, rheumatoid (HCC)   (+) Rheumatoid arthritis (HCC)       Physical Exam    Airway   Mallampati: II  TM distance: >3 FB  Neck ROM: full       Cardiovascular - normal exam  Rhythm: regular  Rate: normal  (-) murmur     Dental - normal exam         Pulmonary - normal exam  Breath sounds clear to auscultation     Abdominal    Neurological - normal exam                 Anesthesia Plan

## 2020-04-29 NOTE — OR SURGEON
Immediate Post OP Note    PreOp Diagnosis: Abnormal imaging, dilated PD/CBD    PostOp Diagnosis: Same    Procedure(s):  GASTROSCOPY - Wound Class: Clean Contaminated  EGD, WITH ENDOSCOPIC US - Wound Class: Clean Contaminated  EGD, WITH ASPIRATION BIOPSY - POSS FNA - Wound Class: Clean Contaminated    Surgeon(s):  Jorge Brooke M.D.    Anesthesiologist/Type of Anesthesia:  Anesthesiologist: René Bonner M.D./* No anesthesia type entered *    Surgical Staff:  Circulator: Joaquin Hung R.N.  Endoscopy Technician: Erinn Nguyen; Jennifer Auguste    Specimens removed if any:  ID Type Source Tests Collected by Time Destination   A :  Tissue Duodenum PATHOLOGY SPECIMEN Jorge Brooke M.D. 4/29/2020  9:33 AM    B :  Tissue Gastric PATHOLOGY SPECIMEN Jorge Brooke M.D. 4/29/2020  9:36 AM    C : GE junction bx Tissue Esophagus PATHOLOGY SPECIMEN Jorge Brooke M.D. 4/29/2020  9:36 AM        Dr. Brooke  [facility]  [facility]  [phone]    EGD with: Biopsy    EUS Upper    Indication: Abnormal imaging, dilated CBD/PD    Sedation: GA (Ha)    Findings:   EGD    Esophagus     Unremarkable proximal mucosa     Esophagitis      At GE junction at 37cm      LA grade A      Biopsied    Stomach     Hiatal hernia      37-39cm     Antrum inflammation      Erythema with mosaic mucosal pattern      Biopsied    Duodenum     Unremarkable mucosa     Biopsied - rule out celiac sprue    Ampulla     Unremarkable     EUS    Celiac axis     No adenopathy    Pancreas body/tail     Normal parenchyma throughout    Pancreatic duct     Normal course     Generous caliber throughout     3.5mm in body, 1.5mm in tail    Head of pancreas     Normal dorsal / ventral transition     Unremarkable parenchyma    Ampulla     Unremarkable    CBD     Normal course     Generous caliber in mid-duct     No filling defects    Plan:  Follow liver enzymes    No further workup for pancreas/bile duct  dilation      4/29/2020 9:39 AM Jorge rBooke M.D.

## 2020-04-29 NOTE — OR NURSING
1000 To PACU from OR via [facility] s/p gastroscopy. Pt sleeping, respirations spontaneous and non-labored via OPA.     1002 Pt rouses to verbal stimuli, OPA removed.    1005 Abdomen soft. Pt has no complaints. Dr Florence at the bedside to discuss the procedure.    1009 Pt medicated for shivering.     1015 Shivering has subsided. Pt remains without complaints. Pt's mother updated.     1030 No changes. Report to discharge LEANNE Hicks.

## 2020-06-10 ENCOUNTER — OFFICE VISIT (OUTPATIENT)
Dept: PHYSICAL MEDICINE AND REHAB | Facility: MEDICAL CENTER | Age: 50
End: 2020-06-10
Payer: MEDICAID

## 2020-06-10 VITALS
HEIGHT: 63 IN | HEART RATE: 87 BPM | OXYGEN SATURATION: 96 % | DIASTOLIC BLOOD PRESSURE: 60 MMHG | TEMPERATURE: 97.5 F | SYSTOLIC BLOOD PRESSURE: 100 MMHG | WEIGHT: 120.37 LBS | BODY MASS INDEX: 21.33 KG/M2

## 2020-06-10 DIAGNOSIS — M19.019 SHOULDER ARTHRITIS: ICD-10-CM

## 2020-06-10 DIAGNOSIS — M53.2X1 ATLANTOAXIAL INSTABILITY: ICD-10-CM

## 2020-06-10 DIAGNOSIS — Z74.09 IMPAIRED MOBILITY AND ADLS: ICD-10-CM

## 2020-06-10 DIAGNOSIS — F32.A DEPRESSION, UNSPECIFIED DEPRESSION TYPE: ICD-10-CM

## 2020-06-10 DIAGNOSIS — S68.411S AMPUTATION OF RIGHT HAND, SEQUELA (HCC): ICD-10-CM

## 2020-06-10 DIAGNOSIS — M05.79 RHEUMATOID ARTHRITIS INVOLVING MULTIPLE SITES WITH POSITIVE RHEUMATOID FACTOR (HCC): ICD-10-CM

## 2020-06-10 DIAGNOSIS — Z78.9 IMPAIRED MOBILITY AND ADLS: ICD-10-CM

## 2020-06-10 PROCEDURE — 99214 OFFICE O/P EST MOD 30 MIN: CPT | Performed by: PHYSICAL MEDICINE & REHABILITATION

## 2020-06-10 RX ORDER — OXYCODONE AND ACETAMINOPHEN 7.5; 325 MG/1; MG/1
1 TABLET ORAL EVERY 4 HOURS PRN
Qty: 150 TAB | Refills: 0 | Status: SHIPPED | OUTPATIENT
Start: 2020-07-19 | End: 2020-08-18

## 2020-06-10 RX ORDER — NALOXONE HYDROCHLORIDE 4 MG/.1ML
SPRAY NASAL
Qty: 1 PACKAGE | Refills: 0 | Status: SHIPPED | OUTPATIENT
Start: 2020-06-10 | End: 2022-09-07

## 2020-06-10 RX ORDER — OXYCODONE AND ACETAMINOPHEN 7.5; 325 MG/1; MG/1
1 TABLET ORAL EVERY 4 HOURS PRN
Qty: 150 TAB | Refills: 0 | Status: SHIPPED | OUTPATIENT
Start: 2020-06-19 | End: 2020-07-19

## 2020-06-10 RX ORDER — OXYCODONE AND ACETAMINOPHEN 7.5; 325 MG/1; MG/1
1 TABLET ORAL EVERY 4 HOURS PRN
COMMUNITY
End: 2020-06-10 | Stop reason: SDUPTHER

## 2020-06-10 RX ORDER — OXYCODONE AND ACETAMINOPHEN 7.5; 325 MG/1; MG/1
1 TABLET ORAL EVERY 4 HOURS PRN
Qty: 150 TAB | Refills: 0 | Status: SHIPPED | OUTPATIENT
Start: 2020-08-18 | End: 2020-09-08 | Stop reason: SDUPTHER

## 2020-06-10 ASSESSMENT — PATIENT HEALTH QUESTIONNAIRE - PHQ9
SUM OF ALL RESPONSES TO PHQ QUESTIONS 1-9: 12
CLINICAL INTERPRETATION OF PHQ2 SCORE: 2
5. POOR APPETITE OR OVEREATING: 2 - MORE THAN HALF THE DAYS

## 2020-06-10 ASSESSMENT — FIBROSIS 4 INDEX: FIB4 SCORE: 0.66

## 2020-06-10 ASSESSMENT — PAIN SCALES - GENERAL: PAINLEVEL: 8=MODERATE-SEVERE PAIN

## 2020-06-10 NOTE — PROGRESS NOTES
Follow up patient note  Pain Medicine, Interventional spine and sports physiatry, Physical medicine rehabilitation      Chief complaint:   Joint pain      HISTORY    Please see new patient note dated 06/08/2018 by Dr Kerr,  for more details.     [facility]  Patient identification: Mary Martinez 50 y.o. female returns for follow-up of her pain related to rheumatoid arthritis.      Interval history:     Since the last visit, Mary reports that she has seen Dr. Dela Cruz twice since the last visit.  They are going are going to start the Embrel again.  This has been about a month, she is hopeful that this will help with worsening joint pains.  She is not sure yet.    Shoulders continue to be painful.  They wake her up at night.  Some times she has some movements that aggravate the pain.  Washing her hair is requiring help now.  She is able to brush her teeth.  Tying her shoes is okay, but she cannot untie them.  Neck pain has been okay, stable.  Occasional, nonpainful cracking.    Intermittent knee pain, but not as bad as previously.  Other joints are less painful than the shoulders.  No recent drainage from residual digit on the right.    She has discontinued gabapentin.  No significant change in pain.    She continues to follow-up with Psychiatry and reports that she has been doing well.      PHQ-9: 12, denies suicidality  ORT: 4    ROS  Unchanged from previous visit 03/17/2020 except as noted in the HPI    Red Flags :   Fever, Chills, Sweats: Denies  Involuntary Weight Loss: Denies  Bowel/Bladder Incontinence: Denies      PMHx:   Past Medical History:   Diagnosis Date   • Arthritis     Rheumatoid -follows with rheumatologist. Has several fractures due to RA.   • ASTHMA     inhalers prn   • Bowel habit changes     4/24/20-constipation and diarrhea.   • Bronchitis last approx 2018   • Chronic pain 04/24/2020    Due to RA   • Dental disorder     no teeth upper-does not wear her denture. Broken teeth on bottom.   • Heart  burn     taking famotidine   • Hiatus hernia syndrome    • History of pancreatitis    • Indigestion     taking famotidine   • Pain     everywhere   • Pneumonia 10/2019   • Psychiatric problem     anxiety and depression   • Rheumatoid arthritis(714.0) 2003       PSHx:   Past Surgical History:   Procedure Laterality Date   • PB ENDOSCOPIC US EXAM, ESOPH  4/29/2020    Procedure: EGD, WITH ENDOSCOPIC US;  Surgeon: Jorge Brooke M.D.;  Location: [facility];  Service: Gastroenterology   • GASTROSCOPY-ENDO  4/29/2020    Procedure: GASTROSCOPY;  Surgeon: Jorge Brooke M.D.;  Location: [facility];  Service: Gastroenterology   • EGD WITH ASP/BX  4/29/2020    Procedure: EGD, WITH ASPIRATION BIOPSY - POSS FNA;  Surgeon: Jorge Brooke M.D.;  Location: [facility];  Service: Gastroenterology   • PB EXPLORATORY OF ABDOMEN N/A 10/4/2019    Procedure: LAPAROTOMY, EXPLORATORY AND REPAIR OF DUODENAL PERFORATION;  Surgeon: James Dumont M.D.;  Location: [facility];  Service: General   • COLONOSCOPY  2018    with upper endoscopy   • FINGER ARTHROPLASTY Right 6/5/2017    Procedure: FINGER ARTHROPLASTY - LONG, RING AND SMALL VOLAR PLATE;  Surgeon: Lobo Rosen M.D.;  Location: SURGERY SAME DAY [facility];  Service:    • FINGER AMPUTATION  6/5/2017    Procedure: FINGER AMPUTATION - LONG, RING AND SMALL AT THE PROXIMAL INTERPHALANGEAL JOINT;  Surgeon: Lobo Rosen M.D.;  Location: SURGERY SAME DAY [facility];  Service:    • FINGER ARTHROPLASTY Right 4/17/2017    Procedure: FINGER ARTHROPLASTY ;  Surgeon: Lobo Rosen M.D.;  Location: SURGERY SAME DAY [facility];  Service:    • FINGER AMPUTATION Right 9/14/2016    Procedure: FINGER AMPUTATION INDEX;  Surgeon: Lobo Rosen M.D.;  Location: SURGERY SAME DAY [facility];  Service:    • IRRIGATION & DEBRIDEMENT ORTHO Right 9/11/2016    Procedure: IRRIGATION & DEBRIDEMENT  ORTHO - right index finger;  Surgeon: Madhu Rosen M.D.;  Location: SURGERY [facility];  Service:    • PIP ARTHRODESIS Right 8/29/2016    Procedure: RE-DO INDEX FINGER PROXIMAL INTERPHALANGEAL ARTHRODESIS;  Surgeon: Lobo Rosen M.D.;  Location: SURGERY SAME DAY [facility];  Service:    • BONE GRAFT Right 8/29/2016    Procedure: BONE GRAFT - DISTAL RADIUS ;  Surgeon: Lobo Rosen M.D.;  Location: SURGERY SAME DAY [facility];  Service:    • ARTHRODESIS Right 5/6/2016    Procedure: ARTHRODESIS INDEX FINGER PROXIMAL INTERPHALANGEAL;  Surgeon: Lobo Rosen M.D.;  Location: SURGERY SAME DAY [facility];  Service:    • FOOT RECONSTRUCTION RHEUMATIC Left 7/29/2015    Procedure: FOOT RECONSTRUCTION RHEUMATIC;  Surgeon: Heriberto Alves M.D.;  Location: SURGERY [facility];  Service:    • TOE FUSION Right 5/27/2015    Procedure: TOE FUSION 1ST METATARSALPHALANGEAL JOINT;  Surgeon: Heriberto Alves M.D.;  Location: SURGERY [facility];  Service:    • BONE SPUR EXCISION  5/27/2015    Procedure: BONE SPUR EXCISION METATARSAL HEAD 2-3;  Surgeon: Heriberto Alves M.D.;  Location: SURGERY [facility];  Service:    • HAMMERTOE CORRECTION  5/27/2015    Procedure: HAMMERTOE CORRECTION AND SOFT TISSUE RE-ALINGMENT 2-3 ;  Surgeon: Heriberto Alves M.D.;  Location: SURGERY [facility];  Service:    • DENTAL EXTRACTION(S)  2014    all of upper teeth   • ABDOMINAL ABSCESS DRAINAGE  9/27/2011    Performed by VERO WRIGHT at [facility]   • EMANUEL BY LAPAROSCOPY  9/27/2011    Performed by VERO WRIGHT at [facility]   • APPENDECTOMY  2011    Found out it had ruptured prior to abcess surgery   • REPEAT C SECTION  2008   • REPEAT C SECTION  2005   • REPEAT C SECTION  1999   • PRIMARY C SECTION  1991   • TONSILLECTOMY  1982   • WRIST EXPLORATION Left 1980's    fractured wrist-no hardware       Family history   History reviewed. No pertinent family  history.      Medications:   Outpatient Medications Marked as Taking for the 6/10/20 encounter (Office Visit) with Jayy Kerr M.D.   Medication Sig Dispense Refill   • [START ON 6/19/2020] oxyCODONE-acetaminophen (PERCOCET) 7.5-325 MG per tablet Take 1 Tab by mouth every four hours as needed for Severe Pain for up to 30 days. 150 Tab 0   • [START ON 7/19/2020] oxyCODONE-acetaminophen (PERCOCET) 7.5-325 MG per tablet Take 1 Tab by mouth every four hours as needed for up to 30 days. 150 Tab 0   • [START ON 8/18/2020] oxyCODONE-acetaminophen (PERCOCET) 7.5-325 MG per tablet Take 1 Tab by mouth every four hours as needed for up to 30 days. 150 Tab 0   • Naloxone (NARCAN) 4 MG/0.1ML Liquid One spray in one nostril for overdose and call 911. 1 Package 0   • Diclofenac Sodium 1 % Gel Apply 2 g to skin as directed 4 times a day.     • famotidine (PEPCID) 40 MG Tab Take 40 mg by mouth every bedtime.     • SPIRIVA RESPIMAT 1.25 MCG/ACT Aero Soln Inhale 1 Puff by mouth every morning.     • ondansetron (ZOFRAN ODT) 8 MG TABLET DISPERSIBLE Take 8 mg by mouth every 8 hours as needed.     • QUEtiapine (SEROQUEL) 100 MG Tab Take 100 mg by mouth every evening.     • PARoxetine (PAXIL) 30 MG Tab Take 60 mg by mouth every evening.     • albuterol (VENTOLIN OR PROVENTIL) 108 (90 BASE) MCG/ACT AERS inhalation aerosol Inhale 2 Puffs by mouth every four hours as needed for Shortness of Breath.         Allergies:   Allergies   Allergen Reactions   • Penicillins Anaphylaxis and Hives     Tolerates cephalosporins; reports throat swelling with PCN   • Nitrous Oxide Vomiting   • Aripiprazole [Abilify] Nausea     Spasms, shaking       Social Hx:   Social History     Socioeconomic History   • Marital status:      Spouse name: Not on file   • Number of children: Not on file   • Years of education: Not on file   • Highest education level: Not on file   Occupational History   • Not on file   Social Needs   • Financial resource strain:  "Not on file   • Food insecurity     Worry: Not on file     Inability: Not on file   • Transportation needs     Medical: Not on file     Non-medical: Not on file   Tobacco Use   • Smoking status: Current Every Day Smoker     Packs/day: 1.00     Years: 30.00     Pack years: 30.00     Types: Cigarettes   • Smokeless tobacco: Never Used   • Tobacco comment: 4/24/20-now smoking approx 1/2 PPD.   Substance and Sexual Activity   • Alcohol use: Never     Frequency: Never     Binge frequency: Never   • Drug use: Never   • Sexual activity: Not on file   Lifestyle   • Physical activity     Days per week: Not on file     Minutes per session: Not on file   • Stress: Not on file   Relationships   • Social connections     Talks on phone: Not on file     Gets together: Not on file     Attends [employer] service: Not on file     Active member of club or organization: Not on file     Attends meetings of clubs or organizations: Not on file     Relationship status: Not on file   • Intimate partner violence     Fear of current or ex partner: Not on file     Emotionally abused: Not on file     Physically abused: Not on file     Forced sexual activity: Not on file   Other Topics Concern   •  Service No   • Blood Transfusions Yes   • Caffeine Concern No   • Occupational Exposure No   • Hobby Hazards No   • Sleep Concern No   • Stress Concern Yes   • Weight Concern No   • Special Diet No   • Back Care No   • Exercise Yes   • Bike Helmet Yes   • Seat Belt Yes   • Self-Exams Yes   Social History Narrative    ** Merged History Encounter **              EXAMINATION     Physical Exam:   Vitals: /60 (BP Location: Left arm, Patient Position: Sitting, BP Cuff Size: Large adult long)   Pulse 87   Temp 36.4 °C (97.5 °F) (Temporal)   Ht 1.6 m (5' 3\")   Wt 54.6 kg (120 lb 5.9 oz)   SpO2 96%     Constitutional:   Body Habitus: Body mass index is 21.32 kg/m².  Cooperation: Fully cooperates with exam  Appearance: Well-groomed no " disheveled, in no acute distress  Respiratory-  breathing comfortable on room air, no audible wheezing  Cardiovascular-  No lower extremity edema is observed  Psychiatric- alert and oriented ×3. Normal affect.     Musculoskeletal:    Left and right shoulder pain with abduction past 50 degrees abduction.     Partial hand amputation on the right at the MCPs; ulnar deviation on the left with MCP subluxation, mild atrophy of the hand intrinsics with wasting of the thenar eminence.   Nontender cyst like structure over the dorsum of the left wrist.  Negative compression test on the left wrist.      MEDICAL DECISION MAKING    DATA    Labs: UDS 10/30/2018 consistent with medications.  Oxycodone and metabolites without other substances   UDS 04/19/2019 consistent with medications. Oxycodone and metabolites without other substances   UDS 07/19/2019 consistent with medications.  Oxycodone and metabolites without other substances   UDS 11/22/2019 consistent with medications.  Oxycodone and metabolites without other substances   UDS 02/20/2020 absent for Oxycodone and metabolites without other substances    Imaging:    MRI cervical spine 07/31/2018:  Films reviewed.  These are my reads:  There is is note of motion at the atlantodental level with 5mm atlantodental inverval in flexion that reduces to 1-2 mm in extension.  Mild retrolisthesis of C4 on C5 and anterolisthesis of C5- on C6.  Disc extrusion at C6-7 with mild central canal stenosis    Xray left shoulder 2/5/2018 Humeral head is elevated.  Glenohumeral joint arthritis.    Reports reviewed:  Xray cervical spine 11/14/2018  1. No acute fracture  2. Persistent motion at the C1-2 joint as before, suggesting atlantoaxial instability  3. Minimal instability noted at C5-6 level as described.    MRI cervical spine 07/31/2018  1. Widening of the atlantodental interal in neutral and flexion positions with a 5mm space which reduces to 1-2 mm in extension  2. Degenerative changes  with the disc extrusion at C6-7 level causing mild canal stenosis    Xray cervical spine 07/06/2018: Atlantoaxial instability at C1-2     Xrays knees 07/06/2018  Left: Unremarkable  Right: Unremarkable             DIAGNOSIS   Visit Diagnoses     ICD-10-CM   1. Rheumatoid arthritis involving multiple sites with positive rheumatoid factor ([facility])  M05.79   2. 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 Shoulder arthritis  M19.019   4. Amputation of right hand, sequela ([facility])  S68.411S   5. Depression, unspecified depression type  F32.9   6. Impaired mobility and ADLs  Z74.09    Z78.9         ASSESSMENT and PLAN:     Mary Martinez 50 y.o. female with rheumatoid arthritis    Mary was seen today for follow-up.    Orders and management for this visit:    1. Possible OT discussed, to work on loss of independence with ADLs.  She will consider this and revisit next visit or sooner, if she would like.  2. Continue percocet 7.5/325 #150 per month.  Refills given for future fill for the next two months.  3. Continue diclofenac gel for shoulders.  Discussed using 2g up to four times a day.  She will allow the gel to dry for 10 minutes prior to covering.  This does give some short-term relief.  4. Continue to follow-up with Dr. Dela Cruz, who will take over for ortho for recurrent draining and treatment if occurs in the residual digit on the right.  5. Continue follow-up with Psychology and Psychiatry.  She denies suicidality currently.  6. Continue to follow-up with GI and PCP.  7. UDS reviewed today, rechecked today.     In prescribing controlled substances to this patient, I certify that I have obtained and reviewed the medical history of Mary Martinez. I have also made a good aristides effort to obtain applicable records from other providers who have treated the patient and records did not demonstrate any increased risk of substance abuse that would prevent me from prescribing controlled substances.     I have conducted a  physical exam and documented it. I have reviewed Ms. Martinez’s prescription history as maintained by the [place] Prescription Monitoring Program.   ORT 4, indicates moderate risk    I have assessed the patient’s risk for abuse, dependency, and addiction using the validated Opioid Risk Tool available at https://www.mdcalc.com/ntilcx-fogk-nyhv-ort-narcotic-abuse.     Given the above, I believe the benefits of controlled substance therapy outweigh the risks. The reasons for prescribing controlled substances include non-narcotic, oral analgesic alternatives have been inadequate for pain control and in my professional opinion, controlled substances are the only reasonable choice for this patient because her pain was note adequately controlled with alternatives and she has chronic progressive disease. Accordingly, I have discussed the risk and benefits, treatment plan, and alternative therapies with the patient.         Follow up: 12 weeks, prn      Please note that this dictation was created using voice recognition software. I have made every reasonable attempt to correct obvious errors but there may be errors of grammar and content that I may have overlooked prior to finalization of this note.      Jayy Kerr MD  Interventional Spine and Sports Physiatry  Physical Medicine and Rehabilitation  [facility]

## 2020-07-21 ENCOUNTER — TELEPHONE (OUTPATIENT)
Dept: PHYSICAL MEDICINE AND REHAB | Facility: MEDICAL CENTER | Age: 50
End: 2020-07-21

## 2020-08-18 ENCOUNTER — TELEPHONE (OUTPATIENT)
Dept: PHYSICAL MEDICINE AND REHAB | Facility: MEDICAL CENTER | Age: 50
End: 2020-08-18

## 2020-08-18 ENCOUNTER — OFFICE VISIT (OUTPATIENT)
Dept: PHYSICAL MEDICINE AND REHAB | Facility: MEDICAL CENTER | Age: 50
End: 2020-08-18
Payer: MEDICAID

## 2020-08-18 VITALS
WEIGHT: 114 LBS | SYSTOLIC BLOOD PRESSURE: 108 MMHG | DIASTOLIC BLOOD PRESSURE: 60 MMHG | TEMPERATURE: 97.8 F | HEIGHT: 63 IN | OXYGEN SATURATION: 96 % | HEART RATE: 99 BPM | BODY MASS INDEX: 20.2 KG/M2

## 2020-08-18 DIAGNOSIS — M05.79 RHEUMATOID ARTHRITIS INVOLVING MULTIPLE SITES WITH POSITIVE RHEUMATOID FACTOR (HCC): ICD-10-CM

## 2020-08-18 DIAGNOSIS — M19.019 SHOULDER ARTHRITIS: ICD-10-CM

## 2020-08-18 DIAGNOSIS — F32.A DEPRESSION, UNSPECIFIED DEPRESSION TYPE: ICD-10-CM

## 2020-08-18 DIAGNOSIS — M53.2X1 ATLANTOAXIAL INSTABILITY: ICD-10-CM

## 2020-08-18 PROCEDURE — 99214 OFFICE O/P EST MOD 30 MIN: CPT | Performed by: PHYSICAL MEDICINE & REHABILITATION

## 2020-08-18 RX ORDER — MONTELUKAST SODIUM 4 MG/1
1 TABLET, CHEWABLE ORAL 2 TIMES DAILY
Status: ON HOLD | COMMUNITY
End: 2021-02-12

## 2020-08-18 ASSESSMENT — PATIENT HEALTH QUESTIONNAIRE - PHQ9
SUM OF ALL RESPONSES TO PHQ QUESTIONS 1-9: 10
5. POOR APPETITE OR OVEREATING: 2 - MORE THAN HALF THE DAYS
CLINICAL INTERPRETATION OF PHQ2 SCORE: 2

## 2020-08-18 ASSESSMENT — PAIN SCALES - GENERAL: PAINLEVEL: NO PAIN

## 2020-08-18 ASSESSMENT — FIBROSIS 4 INDEX: FIB4 SCORE: 0.66

## 2020-08-18 NOTE — PROGRESS NOTES
Follow up patient note  Pain Medicine, Interventional spine and sports physiatry, Physical medicine rehabilitation      Chief complaint:   Joint pain      HISTORY    Please see new patient note dated 06/08/2018 by Dr Kerr,  for more details.     HPI  Patient identification: Mary Martinez 50 y.o. female returns for follow-up of her pain related to rheumatoid arthritis.      Interval history:   Since the last visit, Mary reports that she has been having trouble with the pharmacy.  It sounds like she has been having some issues with medications for her family members and for her.  From what she reports, they have had trouble getting her Embrel there and also made it seem like her percocet might not be filled.    She has been taking percocet 7.5/325 five a day, #150 per month.  She is working on her bowel program with her GI doctor, Dr. Graf.  They are working with Colestipol for diarrhea.      Her last pill was taking 08/17/2020, she is concerned that she could withdrawal because they made it sound like she was not going to get her medications.  She reports that she does not take her medications other than as prescribed and has not requested early refills from this office.      From what she reports, she has been in contact with her PCP regarding getting her medications at the [facility].  This is true for all of her medications except her opioids.    Her son,11, has kidney issues and is under work-up.  She reports that her mood has been stable as she is focused on his care.    For her Rheumatoid arthritis, she is still on Embrel, this has started to reduce the severity of her overall joint pain.    Her right shoulder has been continuing to bother her.  It feels heavy and pulling at times.  Neck continues to have nonpainful cracking.  She is going to see Dr. Valdovinos for annual follow-up soon and will have more diagnostic studies prior to that.      PHQ-9: 10, denies suicidality  ORT:  4    ROS  Unchanged from previous visit 06/10/2020 except as noted in the HPI    Red Flags :   Fever, Chills, Sweats: Denies  Involuntary Weight Loss: Denies  Bowel/Bladder Incontinence: Denies      PMHx:   Past Medical History:   Diagnosis Date   • Arthritis     Rheumatoid -follows with rheumatologist. Has several fractures due to RA.   • ASTHMA     inhalers prn   • Bowel habit changes     4/24/20-constipation and diarrhea.   • Bronchitis last approx 2018   • Chronic pain 04/24/2020    Due to RA   • Dental disorder     no teeth upper-does not wear her denture. Broken teeth on bottom.   • Heart burn     taking famotidine   • Hiatus hernia syndrome    • History of pancreatitis    • Indigestion     taking famotidine   • Pain     everywhere   • Pneumonia 10/2019   • Psychiatric problem     anxiety and depression   • Rheumatoid arthritis(714.0) 2003       PSHx:   Past Surgical History:   Procedure Laterality Date   • PB ENDOSCOPIC US EXAM, ESOPH  4/29/2020    Procedure: EGD, WITH ENDOSCOPIC US;  Surgeon: Jorge Brooke M.D.;  Location: SURGERY [facility];  Service: Gastroenterology   • GASTROSCOPY-ENDO  4/29/2020    Procedure: GASTROSCOPY;  Surgeon: Jorge Brooke M.D.;  Location: [facility];  Service: Gastroenterology   • EGD WITH ASP/BX  4/29/2020    Procedure: EGD, WITH ASPIRATION BIOPSY - POSS FNA;  Surgeon: Jorge Brooke M.D.;  Location: [facility];  Service: Gastroenterology   • PB EXPLORATORY OF ABDOMEN N/A 10/4/2019    Procedure: LAPAROTOMY, EXPLORATORY AND REPAIR OF DUODENAL PERFORATION;  Surgeon: James Dumont M.D.;  Location: SURGERY [facility];  Service: General   • COLONOSCOPY  2018    with upper endoscopy   • FINGER ARTHROPLASTY Right 6/5/2017    Procedure: FINGER ARTHROPLASTY - LONG, RING AND SMALL VOLAR PLATE;  Surgeon: Lobo Rosen M.D.;  Location: SURGERY SAME DAY [facility];  Service:    • FINGER AMPUTATION  6/5/2017     Procedure: FINGER AMPUTATION - LONG, RING AND SMALL AT THE PROXIMAL INTERPHALANGEAL JOINT;  Surgeon: Lobo Rosen M.D.;  Location: SURGERY SAME DAY [facility];  Service:    • FINGER ARTHROPLASTY Right 4/17/2017    Procedure: FINGER ARTHROPLASTY ;  Surgeon: Lobo Rosen M.D.;  Location: SURGERY SAME DAY [facility];  Service:    • FINGER AMPUTATION Right 9/14/2016    Procedure: FINGER AMPUTATION INDEX;  Surgeon: Lobo Rosen M.D.;  Location: SURGERY SAME DAY [facility];  Service:    • IRRIGATION & DEBRIDEMENT ORTHO Right 9/11/2016    Procedure: IRRIGATION & DEBRIDEMENT ORTHO - right index finger;  Surgeon: Madhu Rosen M.D.;  Location: [facility];  Service:    • PIP ARTHRODESIS Right 8/29/2016    Procedure: RE-DO INDEX FINGER PROXIMAL INTERPHALANGEAL ARTHRODESIS;  Surgeon: Lobo Rosen M.D.;  Location: SURGERY SAME DAY [facility];  Service:    • BONE GRAFT Right 8/29/2016    Procedure: BONE GRAFT - DISTAL RADIUS ;  Surgeon: Lobo Rosen M.D.;  Location: SURGERY SAME DAY [facility];  Service:    • ARTHRODESIS Right 5/6/2016    Procedure: ARTHRODESIS INDEX FINGER PROXIMAL INTERPHALANGEAL;  Surgeon: Lobo Rosen M.D.;  Location: SURGERY SAME DAY [facility];  Service:    • FOOT RECONSTRUCTION RHEUMATIC Left 7/29/2015    Procedure: FOOT RECONSTRUCTION RHEUMATIC;  Surgeon: Heriberto Alves M.D.;  Location: [facility];  Service:    • TOE FUSION Right 5/27/2015    Procedure: TOE FUSION 1ST METATARSALPHALANGEAL JOINT;  Surgeon: Heriberto Alves M.D.;  Location: [facility];  Service:    • BONE SPUR EXCISION  5/27/2015    Procedure: BONE SPUR EXCISION METATARSAL HEAD 2-3;  Surgeon: Heriberto Alves M.D.;  Location: [facility];  Service:    • HAMMERTOE CORRECTION  5/27/2015    Procedure: HAMMERTOE CORRECTION AND SOFT TISSUE RE-ALINGMENT 2-3 ;  Surgeon: Heriberto Alves M.D.;  Location: [facility];   Service:    • DENTAL EXTRACTION(S)  2014    all of upper teeth   • ABDOMINAL ABSCESS DRAINAGE  9/27/2011    Performed by VERO WRIGHT at SURGERY [facility]S   • EMANUEL BY LAPAROSCOPY  9/27/2011    Performed by VERO WRIGHT at SURGERY [facility] ORS   • APPENDECTOMY  2011    Found out it had ruptured prior to abcess surgery   • REPEAT C SECTION  2008   • REPEAT C SECTION  2005   • REPEAT C SECTION  1999   • PRIMARY C SECTION  1991   • TONSILLECTOMY  1982   • WRIST EXPLORATION Left 1980's    fractured wrist-no hardware       Family history   No family history on file.      Medications:   Outpatient Medications Marked as Taking for the 8/18/20 encounter (Office Visit) with Jayy Kerr M.D.   Medication Sig Dispense Refill   • colestipol (COLESTID) 1 GM Tab Take 1 g by mouth 2 times a day.     • oxyCODONE-acetaminophen (PERCOCET) 7.5-325 MG per tablet Take 1 Tab by mouth every four hours as needed for up to 30 days. 150 Tab 0   • oxyCODONE-acetaminophen (PERCOCET) 7.5-325 MG per tablet Take 1 Tab by mouth every four hours as needed for up to 30 days. 150 Tab 0   • Naloxone (NARCAN) 4 MG/0.1ML Liquid One spray in one nostril for overdose and call 911. 1 Package 0   • Diclofenac Sodium 1 % Gel Apply 2 g to skin as directed 4 times a day.     • ondansetron (ZOFRAN ODT) 8 MG TABLET DISPERSIBLE Take 8 mg by mouth every 8 hours as needed.     • QUEtiapine (SEROQUEL) 100 MG Tab Take 100 mg by mouth every evening.     • PARoxetine (PAXIL) 30 MG Tab Take 60 mg by mouth every evening.     • albuterol (VENTOLIN OR PROVENTIL) 108 (90 BASE) MCG/ACT AERS inhalation aerosol Inhale 2 Puffs by mouth every four hours as needed for Shortness of Breath.         Allergies:   Allergies   Allergen Reactions   • Penicillins Anaphylaxis and Hives     Tolerates cephalosporins; reports throat swelling with PCN   • Nitrous Oxide Vomiting   • Aripiprazole [Abilify] Nausea     Spasms, shaking       Social Hx:   Social History      Socioeconomic History   • Marital status:      Spouse name: Not on file   • Number of children: Not on file   • Years of education: Not on file   • Highest education level: Not on file   Occupational History   • Not on file   Social Needs   • Financial resource strain: Not on file   • Food insecurity     Worry: Not on file     Inability: Not on file   • Transportation needs     Medical: Not on file     Non-medical: Not on file   Tobacco Use   • Smoking status: Current Every Day Smoker     Packs/day: 1.00     Years: 30.00     Pack years: 30.00     Types: Cigarettes   • Smokeless tobacco: Never Used   • Tobacco comment: 4/24/20-now smoking approx 1/2 PPD.   Substance and Sexual Activity   • Alcohol use: Never     Frequency: Never     Binge frequency: Never   • Drug use: Never   • Sexual activity: Not on file   Lifestyle   • Physical activity     Days per week: Not on file     Minutes per session: Not on file   • Stress: Not on file   Relationships   • Social connections     Talks on phone: Not on file     Gets together: Not on file     Attends Anabaptism service: Not on file     Active member of club or organization: Not on file     Attends meetings of clubs or organizations: Not on file     Relationship status: Not on file   • Intimate partner violence     Fear of current or ex partner: Not on file     Emotionally abused: Not on file     Physically abused: Not on file     Forced sexual activity: Not on file   Other Topics Concern   •  Service No   • Blood Transfusions Yes   • Caffeine Concern No   • Occupational Exposure No   • Hobby Hazards No   • Sleep Concern No   • Stress Concern Yes   • Weight Concern No   • Special Diet No   • Back Care No   • Exercise Yes   • Bike Helmet Yes   • Seat Belt Yes   • Self-Exams Yes   Social History Narrative    ** Merged History Encounter **              EXAMINATION     Physical Exam:   Vitals: /60 (BP Location: Left arm, Patient Position: Sitting, BP Cuff  "Size: Adult)   Pulse 99   Temp 36.6 °C (97.8 °F) (Temporal)   Ht 1.6 m (5' 3\")   Wt 51.7 kg (114 lb)   SpO2 96%     Constitutional:   Body Habitus: Body mass index is 20.19 kg/m².  Cooperation: Fully cooperates with exam  Appearance: Well-groomed no disheveled, in no acute distress  Respiratory-  breathing comfortable on room air, no audible wheezing  Cardiovascular- No lower extremity edema is observed  Psychiatric- alert and oriented ×3. Normal affect.     Musculoskeletal:  Right shoulder with crepitus, pain past abduction 50 degrees.  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       MEDICAL DECISION MAKING    DATA    Labs: UDS 10/30/2018 consistent with medications.  Oxycodone and metabolites without other substances   UDS 04/19/2019 consistent with medications. Oxycodone and metabolites without other substances   UDS 07/19/2019 consistent with medications.  Oxycodone and metabolites without other substances   UDS 11/22/2019 consistent with medications.  Oxycodone and metabolites without other substances   UDS 02/20/2020 absent for Oxycodone and metabolites without other substances  UDS 06/10/2020 positive for oxycodone and metabolites without other substances    Imaging:    MRI cervical spine 07/31/2018:  Films reviewed.  These are my reads:  There is is note of motion at the atlantodental level with 5mm atlantodental inverval in flexion that reduces to 1-2 mm in extension.  Mild retrolisthesis of C4 on C5 and anterolisthesis of C5- on C6.  Disc extrusion at C6-7 with mild central canal stenosis    Xray left shoulder 2/5/2018 Humeral head is elevated.  Glenohumeral joint arthritis.    Reports reviewed:  Xray cervical spine 11/14/2018  1. No acute fracture  2. Persistent motion at the C1-2 joint as before, suggesting atlantoaxial instability  3. Minimal " instability noted at C5-6 level as described.    MRI cervical spine 07/31/2018  1. Widening of the atlantodental interal in neutral and flexion positions with a 5mm space which reduces to 1-2 mm in extension  2. 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 Rheumatoid arthritis involving multiple sites with positive rheumatoid factor (HCC)  M05.79   2. Shoulder arthritis  M19.019   3. 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 Depression, unspecified depression type  F32.9         ASSESSMENT and PLAN:     Mary Estes Martinez 50 y.o. female with rheumatoid arthritis    Mary was seen today for follow-up.    Orders and management for this visit:    Orders Placed This Encounter   • DX-SHOULDER 2+ RIGHT   • Pain Management Screen   • Patient has been identified as having a positive depression screening. Appropriate orders and counseling have been given.   • colestipol (COLESTID) 1 GM Tab         1. Discussed checking UDS today.  Will continue to monitor closely. She did have one UDS which did not demonstrate percocet, but has otherwise had UDS as expected and recheck 06/10/2020 as expected.   reviewed.  Will continue to monitor closely  2. Xray right shoulder ordered.  3. Continue Embrel and care with Dr. Dela Cruz.  4. Continue follow-up with PCP  5. Continue diclofenac gel for shoulders.  Discussed using 2g up to four times a day.  She will allow the gel to dry for 10 minutes prior to covering.  This does give some short-term relief.  Just refilled   6. Continue follow-up with Psychology and Psychiatry.   7. Follow-up with Dr. Valdovinos as scheduled.      In prescribing controlled substances to this patient, I certify that I have obtained and reviewed the medical history of Mary Izaguirreanor  Juan. I have also made a good aristides effort to obtain applicable records from other providers who have treated the patient and records did not demonstrate any increased risk of substance abuse that would prevent me from prescribing controlled substances.     I have conducted a physical exam and documented it. I have reviewed Ms. Martinez’s prescription history as maintained by the [place] Prescription Monitoring Program.   ORT 4, indicates moderate risk    I have assessed the patient’s risk for abuse, dependency, and addiction using the validated Opioid Risk Tool available at https://www.mdcalc.com/rzhkpc-jvxn-mmox-ort-narcotic-abuse.     Given the above, I believe the benefits of controlled substance therapy outweigh the risks. The reasons for prescribing controlled substances include non-narcotic, oral analgesic alternatives have been inadequate for pain control and in my professional opinion, controlled substances are the only reasonable choice for this patient because her pain was note adequately controlled with alternatives and she has chronic progressive disease. Accordingly, I have discussed the risk and benefits, treatment plan, and alternative therapies with the patient.         Follow up: 4 weeks, prn      Please note that this dictation was created using voice recognition software. I have made every reasonable attempt to correct obvious errors but there may be errors of grammar and content that I may have overlooked prior to finalization of this note.      Jayy Kerr MD  Interventional Spine and Sports Physiatry  Physical Medicine and Rehabilitation  [facility]

## 2020-08-20 ENCOUNTER — HOSPITAL ENCOUNTER (OUTPATIENT)
Dept: RADIOLOGY | Facility: MEDICAL CENTER | Age: 50
End: 2020-08-20
Payer: MEDICAID

## 2020-09-08 ENCOUNTER — OFFICE VISIT (OUTPATIENT)
Dept: PHYSICAL MEDICINE AND REHAB | Facility: MEDICAL CENTER | Age: 50
End: 2020-09-08
Payer: MEDICAID

## 2020-09-08 VITALS
OXYGEN SATURATION: 94 % | HEIGHT: 63 IN | WEIGHT: 117.28 LBS | BODY MASS INDEX: 20.78 KG/M2 | DIASTOLIC BLOOD PRESSURE: 62 MMHG | SYSTOLIC BLOOD PRESSURE: 118 MMHG | HEART RATE: 100 BPM | TEMPERATURE: 98 F

## 2020-09-08 DIAGNOSIS — M19.019 ARTHRITIS OF GLENOHUMERAL JOINT: ICD-10-CM

## 2020-09-08 DIAGNOSIS — M05.79 RHEUMATOID ARTHRITIS INVOLVING MULTIPLE SITES WITH POSITIVE RHEUMATOID FACTOR (HCC): ICD-10-CM

## 2020-09-08 DIAGNOSIS — M53.2X1 ATLANTOAXIAL INSTABILITY: ICD-10-CM

## 2020-09-08 DIAGNOSIS — M25.511 RIGHT SHOULDER PAIN, UNSPECIFIED CHRONICITY: ICD-10-CM

## 2020-09-08 DIAGNOSIS — F32.A DEPRESSION, UNSPECIFIED DEPRESSION TYPE: ICD-10-CM

## 2020-09-08 PROCEDURE — 99214 OFFICE O/P EST MOD 30 MIN: CPT | Performed by: PHYSICAL MEDICINE & REHABILITATION

## 2020-09-08 RX ORDER — OXYCODONE AND ACETAMINOPHEN 7.5; 325 MG/1; MG/1
1 TABLET ORAL EVERY 4 HOURS PRN
Qty: 150 TAB | Refills: 0 | Status: SHIPPED | OUTPATIENT
Start: 2020-09-17 | End: 2020-10-01 | Stop reason: SDUPTHER

## 2020-09-08 ASSESSMENT — PATIENT HEALTH QUESTIONNAIRE - PHQ9
CLINICAL INTERPRETATION OF PHQ2 SCORE: 6
SUM OF ALL RESPONSES TO PHQ QUESTIONS 1-9: 21
5. POOR APPETITE OR OVEREATING: 3 - NEARLY EVERY DAY

## 2020-09-08 ASSESSMENT — PAIN SCALES - GENERAL: PAINLEVEL: 10=SEVERE PAIN

## 2020-09-08 ASSESSMENT — FIBROSIS 4 INDEX: FIB4 SCORE: 0.66

## 2020-09-08 NOTE — PROGRESS NOTES
Follow up patient note  Pain Medicine, Interventional spine and sports physiatry, Physical medicine rehabilitation      Chief complaint:   Joint pain      HISTORY    Please see new patient note dated 06/08/2018 by Dr Kerr,  for more details.     \[facility]\""  Patient identification: Mary Martinez 50 y.o. female returns for follow-up of her pain related to rheumatoid arthritis.      Interval history:   Since the last visit, Mary reports that she has had significant pain in her neck and right shoulder.  The right shoulder is so painful that she is having difficulty brushing her hair again.  All movement of the right shoulder is painful, she does not want her pain medications increased, but wants to have improvement in her pain.  She has had some limitations with her orthopedic care as she was seen at [facility] and from the way she says it, they do not take her insurance, which poses a barrier to treatment.      She also reports that her neck continues to be painful.  In October 2020, she reports that she has an MRI and xray scheduled for follow-up at [facility].    She has been taking percocet 7.5/325 five a day, #150 per month and stable.  No bowel or bladder difficulties lately.    For her Rheumatoid arthritis, she is still on Embrel, this has started to reduce the severity of her overall joint pain.      PHQ-9: 21, denies suicidality  ORT: 4    ROS  Unchanged from previous visit 08/18/2020 except as noted in the HPI    Red Flags :   Fever, Chills, Sweats: Denies  Involuntary Weight Loss: Denies  Bowel/Bladder Incontinence: Denies      PMHx:   Past Medical History:   Diagnosis Date   • Arthritis     Rheumatoid -follows with rheumatologist. Has several fractures due to RA.   • ASTHMA     inhalers prn   • Bowel habit changes     4/24/20-constipation and diarrhea.   • Bronchitis last approx 2018   • Chronic pain 04/24/2020    Due to RA   • Dental disorder     no teeth upper-does not wear her denture. Broken teeth  on bottom.   • Heart burn     taking famotidine   • Hiatus hernia syndrome    • History of pancreatitis    • Indigestion     taking famotidine   • Pain     everywhere   • Pneumonia 10/2019   • Psychiatric problem     anxiety and depression   • Rheumatoid arthritis(714.0) 2003       PSHx:   Past Surgical History:   Procedure Laterality Date   • PB ENDOSCOPIC US EXAM, ESOPH  4/29/2020    Procedure: EGD, WITH ENDOSCOPIC US;  Surgeon: Jorge Brooke M.D.;  Location: [facility];  Service: Gastroenterology   • GASTROSCOPY-ENDO  4/29/2020    Procedure: GASTROSCOPY;  Surgeon: Jorge Brooke M.D.;  Location: [facility];  Service: Gastroenterology   • EGD WITH ASP/BX  4/29/2020    Procedure: EGD, WITH ASPIRATION BIOPSY - POSS FNA;  Surgeon: Jorge Brooke M.D.;  Location: [facility];  Service: Gastroenterology   • PB EXPLORATORY OF ABDOMEN N/A 10/4/2019    Procedure: LAPAROTOMY, EXPLORATORY AND REPAIR OF DUODENAL PERFORATION;  Surgeon: James Dumont M.D.;  Location: [facility];  Service: General   • COLONOSCOPY  2018    with upper endoscopy   • FINGER ARTHROPLASTY Right 6/5/2017    Procedure: FINGER ARTHROPLASTY - LONG, RING AND SMALL VOLAR PLATE;  Surgeon: Lobo Rosen M.D.;  Location: SURGERY SAME DAY [facility] [place];  Service:    • FINGER AMPUTATION  6/5/2017    Procedure: FINGER AMPUTATION - LONG, RING AND SMALL AT THE PROXIMAL INTERPHALANGEAL JOINT;  Surgeon: Lobo Rosen M.D.;  Location: SURGERY SAME DAY [facility] [place];  Service:    • FINGER ARTHROPLASTY Right 4/17/2017    Procedure: FINGER ARTHROPLASTY ;  Surgeon: Lobo Rosen M.D.;  Location: SURGERY SAME DAY [facility] [place];  Service:    • FINGER AMPUTATION Right 9/14/2016    Procedure: FINGER AMPUTATION INDEX;  Surgeon: Lobo Rosen M.D.;  Location: SURGERY SAME DAY [facility] [place];  Service:    • IRRIGATION & DEBRIDEMENT ORTHO Right 9/11/2016    Procedure:  IRRIGATION & DEBRIDEMENT ORTHO - right index finger;  Surgeon: Madhu Rosen M.D.;  Location: SURGERY [facility];  Service:    • PIP ARTHRODESIS Right 8/29/2016    Procedure: RE-DO INDEX FINGER PROXIMAL INTERPHALANGEAL ARTHRODESIS;  Surgeon: Lobo Rosen M.D.;  Location: SURGERY SAME DAY [facility];  Service:    • BONE GRAFT Right 8/29/2016    Procedure: BONE GRAFT - DISTAL RADIUS ;  Surgeon: Lobo Rosen M.D.;  Location: SURGERY SAME DAY [facility];  Service:    • ARTHRODESIS Right 5/6/2016    Procedure: ARTHRODESIS INDEX FINGER PROXIMAL INTERPHALANGEAL;  Surgeon: Lobo Rosen M.D.;  Location: SURGERY SAME DAY [facility];  Service:    • FOOT RECONSTRUCTION RHEUMATIC Left 7/29/2015    Procedure: FOOT RECONSTRUCTION RHEUMATIC;  Surgeon: Heriberto Alves M.D.;  Location: SURGERY [facility];  Service:    • TOE FUSION Right 5/27/2015    Procedure: TOE FUSION 1ST METATARSALPHALANGEAL JOINT;  Surgeon: Heriberto Alves M.D.;  Location: SURGERY [facility];  Service:    • BONE SPUR EXCISION  5/27/2015    Procedure: BONE SPUR EXCISION METATARSAL HEAD 2-3;  Surgeon: Heriberto Alves M.D.;  Location: SURGERY [facility];  Service:    • HAMMERTOE CORRECTION  5/27/2015    Procedure: HAMMERTOE CORRECTION AND SOFT TISSUE RE-ALINGMENT 2-3 ;  Surgeon: Heriberto Alves M.D.;  Location: SURGERY [facility];  Service:    • DENTAL EXTRACTION(S)  2014    all of upper teeth   • ABDOMINAL ABSCESS DRAINAGE  9/27/2011    Performed by VERO WRIGHT at [facility]   • EMANUEL BY LAPAROSCOPY  9/27/2011    Performed by VERO WRIGHT at [facility]   • APPENDECTOMY  2011    Found out it had ruptured prior to [facility] surgery   • REPEAT C SECTION  2008   • REPEAT C SECTION  2005   • REPEAT C SECTION  1999   • PRIMARY C SECTION  1991   • TONSILLECTOMY  1982   • WRIST EXPLORATION Left 1980's    fractured wrist-no hardware       Family history   History reviewed. No  pertinent family history.      Medications:   Outpatient Medications Marked as Taking for the 9/8/20 encounter (Office Visit) with Jayy Kerr M.D.   Medication Sig Dispense Refill   • [START ON 9/17/2020] oxyCODONE-acetaminophen (PERCOCET) 7.5-325 MG per tablet Take 1 Tab by mouth every four hours as needed for up to 30 days. 150 Tab 0   • Misc. Devices Misc 1 Device by Does not apply route 1 time daily as needed (Dx: severe right shoulder arthritis, chronic rotator cuff syndrome   Dispense 1 shoulder immobilizer for right shoulder) for up to 30 days. 1 Each 0   • colestipol (COLESTID) 1 GM Tab Take 1 g by mouth 2 times a day.     • Naloxone (NARCAN) 4 MG/0.1ML Liquid One spray in one nostril for overdose and call 911. 1 Package 0   • Diclofenac Sodium 1 % Gel Apply 2 g to skin as directed 4 times a day.     • famotidine (PEPCID) 40 MG Tab Take 40 mg by mouth every bedtime.     • SPIRIVA RESPIMAT 1.25 MCG/ACT Aero Soln Inhale 1 Puff by mouth every morning.     • ondansetron (ZOFRAN ODT) 8 MG TABLET DISPERSIBLE Take 8 mg by mouth every 8 hours as needed.     • QUEtiapine (SEROQUEL) 100 MG Tab Take 100 mg by mouth every evening.     • PARoxetine (PAXIL) 30 MG Tab Take 60 mg by mouth every evening.     • albuterol (VENTOLIN OR PROVENTIL) 108 (90 BASE) MCG/ACT AERS inhalation aerosol Inhale 2 Puffs by mouth every four hours as needed for Shortness of Breath.         Allergies:   Allergies   Allergen Reactions   • Penicillins Anaphylaxis and Hives     Tolerates cephalosporins; reports throat swelling with PCN   • Nitrous Oxide Vomiting   • Aripiprazole [Abilify] Nausea     Spasms, shaking       Social Hx:   Social History     Socioeconomic History   • Marital status:      Spouse name: Not on file   • Number of children: Not on file   • Years of education: Not on file   • Highest education level: Not on file   Occupational History   • Not on file   Social Needs   • Financial resource strain: Not on file   •  "Food insecurity     Worry: Not on file     Inability: Not on file   • Transportation needs     Medical: Not on file     Non-medical: Not on file   Tobacco Use   • Smoking status: Current Every Day Smoker     Packs/day: 1.00     Years: 30.00     Pack years: 30.00     Types: Cigarettes   • Smokeless tobacco: Never Used   • Tobacco comment: 4/24/20-now smoking approx 1/2 PPD.   Substance and Sexual Activity   • Alcohol use: Never     Frequency: Never     Binge frequency: Never   • Drug use: Never   • Sexual activity: Not on file   Lifestyle   • Physical activity     Days per week: Not on file     Minutes per session: Not on file   • Stress: Not on file   Relationships   • Social connections     Talks on phone: Not on file     Gets together: Not on file     Attends [employer] service: Not on file     Active member of club or organization: Not on file     Attends meetings of clubs or organizations: Not on file     Relationship status: Not on file   • Intimate partner violence     Fear of current or ex partner: Not on file     Emotionally abused: Not on file     Physically abused: Not on file     Forced sexual activity: Not on file   Other Topics Concern   •  Service No   • Blood Transfusions Yes   • Caffeine Concern No   • Occupational Exposure No   • Hobby Hazards No   • Sleep Concern No   • Stress Concern Yes   • Weight Concern No   • Special Diet No   • Back Care No   • Exercise Yes   • Bike Helmet Yes   • Seat Belt Yes   • Self-Exams Yes   Social History Narrative    ** Merged History Encounter **              EXAMINATION     Physical Exam:   Vitals: /62 (BP Location: Left arm, Patient Position: Sitting, BP Cuff Size: Small adult)   Pulse 100   Temp 36.7 °C (98 °F) (Temporal)   Ht 1.6 m (5' 3\")   Wt 53.2 kg (117 lb 4.6 oz)   SpO2 94%     Constitutional:   Body Habitus: Body mass index is 20.78 kg/m².  Cooperation: Fully cooperates with exam  Appearance: Well-groomed no disheveled, in no acute " distress  Respiratory-  breathing comfortable on room air, no audible wheezing  Cardiovascular- No lower extremity edema is observed  Psychiatric- alert and oriented ×3. Normal affect.     Musculoskeletal:  Right shoulder with crepitus and pain with ROM.  Positive Hawkin's test.  Positive Neer's test.    Pain limits manual muscle testing in the upper extremities.      Functional ROM of the cervical spine with pain.    Sensation is grossly intact in the upper extremities bilaterally    Gait is steady without assist device.    Partial hand amputation on the right at the MCPs; ulnar deviation on the left with MCP subluxation, mild atrophy of the hand intrinsics with wasting of the thenar eminence.       MEDICAL DECISION MAKING    DATA    Labs: UDS 10/30/2018 consistent with medications.  Oxycodone and metabolites without other substances   UDS 04/19/2019 consistent with medications. Oxycodone and metabolites without other substances   UDS 07/19/2019 consistent with medications.  Oxycodone and metabolites without other substances   UDS 11/22/2019 consistent with medications.  Oxycodone and metabolites without other substances   UDS 02/20/2020 absent for Oxycodone and metabolites without other substances  UDS 06/10/2020 positive for oxycodone and metabolites without other substances  UDS 08/18/2020 positive for oxycodone and metabolites, positive for EtG without EtS    Imaging:    Films reviewed.  These are my reads:    Xray right shoulder 08/20/2020  There is note of severe degenerative change of the glenohumeral joint.  No fracture.  Erosive changes, consistent with known history of RA    MRI cervical spine 07/31/2018:    There is is note of motion at the atlantodental level with 5mm atlantodental inverval in flexion that reduces to 1-2 mm in extension.  Mild retrolisthesis of C4 on C5 and anterolisthesis of C5- on C6.  Disc extrusion at C6-7 with mild central canal stenosis    Xray left shoulder 2/5/2018 Humeral head  is elevated.  Glenohumeral joint arthritis.    Reports reviewed:    Xray right shoulder 08/20/2020 [facility]  Impression  Extensive erosive changes of the humeral head and degenerative changes of the glenohumeral joint.  Findings are concerning for inflammatory arthropathy such as rheumatoid arthritis.      Xray cervical spine 11/14/2018  1. No acute fracture  2. Persistent motion at the C1-2 joint as before, suggesting atlantoaxial instability  3. Minimal instability noted at C5-6 level as described.    MRI cervical spine 07/31/2018  1. Widening of the atlantodental interal in neutral and flexion positions with a 5mm space which reduces to 1-2 mm in extension  2. 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 Depression, unspecified depression type  F32.9   3. Rheumatoid arthritis involving multiple sites with positive rheumatoid factor (HCC)  M05.79   4. Arthritis of glenohumeral joint  M19.019   5. Right shoulder pain, unspecified chronicity  M25.511         ASSESSMENT and PLAN:     Mary Estes Juan 50 y.o. female with rheumatoid arthritis    Mary was seen today for follow-up.    Orders and management for this visit:    Orders Placed This Encounter   • REFERRAL TO PHYSIATRY (PMR)   • REFERRAL TO ORTHOPEDICS   • Patient has been identified as having a positive depression screening. Appropriate orders and counseling have been given.   • oxyCODONE-acetaminophen (PERCOCET) 7.5-325 MG per tablet   • Misc. Devices Misc   • Consent for Opiate Prescription   • Controlled Substance Treatment Agreement       1. Discussed findings on UDS.  Will continue to monitor.  Continue percocet 7.5/325 #150.  reviewed.  2. Plan for work-up and follow-up regarding  atlantoaxial instability with Dr. Valdovinos.  3. Discussed use of a shoulder splint to immobilize the right shoulder with goal of temporary use to minimize pain.  4. Discussed right glenohumeral joint injection.  The risks benefits and alternatives to this procedure were discussed and the patient wishes to proceed with the procedure. Risks include but are not limited to damage to surrounding structures, infection, bleeding, worsening of pain which can be permanent, weakness which can be permanent. Benefits include pain relief, improved function. Alternatives includes not doing the procedure.    5. Continue Embrel and care with Dr. Dela Cruz.  6. Continue follow-up with PCP  7. Continue diclofenac gel for shoulders.  Discussed using 2g up to four times a day.  She will allow the gel to dry for 10 minutes prior to covering.  This does give some short-term relief.  Just refilled   8. Continue follow-up with Psychology and Psychiatry.   9.  Encouraged her to follow-up with referral to Orthopedics.  Plan to resent referral to [facility]      In prescribing controlled substances to this patient, I certify that I have obtained and reviewed the medical history of Mary Martinez. I have also made a good aristides effort to obtain applicable records from other providers who have treated the patient and records did not demonstrate any increased risk of substance abuse that would prevent me from prescribing controlled substances.     I have conducted a physical exam and documented it. I have reviewed Ms. Martinez’s prescription history as maintained by the [place] Prescription Monitoring Program.   ORT 4, indicates moderate risk    I have assessed the patient’s risk for abuse, dependency, and addiction using the validated Opioid Risk Tool available at https://www.mdcalc.com/eegdfp-cxhj-pzzf-ort-narcotic-abuse.     Given the above, I believe the benefits of controlled substance therapy outweigh the risks.  The reasons for prescribing controlled substances include non-narcotic, oral analgesic alternatives have been inadequate for pain control and in my professional opinion, controlled substances are the only reasonable choice for this patient because her pain was note adequately controlled with alternatives and she has chronic progressive disease. Accordingly, I have discussed the risk and benefits, treatment plan, and alternative therapies with the patient.         Follow up: Office injection      Please note that this dictation was created using voice recognition software. I have made every reasonable attempt to correct obvious errors but there may be errors of grammar and content that I may have overlooked prior to finalization of this note.      Jayy Kerr MD  Interventional Spine and Sports Physiatry  Physical Medicine and Rehabilitation  [facility]

## 2020-09-15 ENCOUNTER — OFFICE VISIT (OUTPATIENT)
Dept: PHYSICAL MEDICINE AND REHAB | Facility: MEDICAL CENTER | Age: 50
End: 2020-09-15
Payer: MEDICAID

## 2020-09-15 VITALS
DIASTOLIC BLOOD PRESSURE: 80 MMHG | HEART RATE: 99 BPM | HEIGHT: 63 IN | WEIGHT: 115.3 LBS | OXYGEN SATURATION: 97 % | TEMPERATURE: 97.8 F | BODY MASS INDEX: 20.43 KG/M2 | SYSTOLIC BLOOD PRESSURE: 118 MMHG

## 2020-09-15 DIAGNOSIS — M19.019 ARTHRITIS OF GLENOHUMERAL JOINT: ICD-10-CM

## 2020-09-15 DIAGNOSIS — M25.511 RIGHT SHOULDER PAIN, UNSPECIFIED CHRONICITY: ICD-10-CM

## 2020-09-15 PROCEDURE — 20611 DRAIN/INJ JOINT/BURSA W/US: CPT | Mod: RT | Performed by: PHYSICAL MEDICINE & REHABILITATION

## 2020-09-15 PROCEDURE — 99999 PR NO CHARGE: CPT | Mod: 25 | Performed by: PHYSICAL MEDICINE & REHABILITATION

## 2020-09-15 RX ORDER — DEXAMETHASONE SODIUM PHOSPHATE 4 MG/ML
4 INJECTION, SOLUTION INTRA-ARTICULAR; INTRALESIONAL; INTRAMUSCULAR; INTRAVENOUS; SOFT TISSUE ONCE
Status: COMPLETED | OUTPATIENT
Start: 2020-09-15 | End: 2020-09-15

## 2020-09-15 RX ADMIN — DEXAMETHASONE SODIUM PHOSPHATE 4 MG: 4 INJECTION, SOLUTION INTRA-ARTICULAR; INTRALESIONAL; INTRAMUSCULAR; INTRAVENOUS; SOFT TISSUE at 09:40

## 2020-09-15 ASSESSMENT — PAIN SCALES - GENERAL: PAINLEVEL: 10=SEVERE PAIN

## 2020-09-15 ASSESSMENT — FIBROSIS 4 INDEX: FIB4 SCORE: 0.66

## 2020-09-15 NOTE — PROCEDURES
Date of Service: 9/15/2020     Physician/s: Jayy Kerr MD    Pre-operative Diagnosis:Arthritis of glenohumeral joint(M19.019), Right shoulder pain (M25.511)     Post-operative Diagnosis: Arthritis of glenohumeral joint(M19.019), Right shoulder pain (M25.511)    Procedure: right glenohumeral joint injection ultrasound-guided     Description of procedure:    The risks, benefits, and alternatives of the procedure were reviewed and discussed with the patient.  Written informed consent was freely obtained. A pre-procedural time-out was conducted by the physician verifying patient’s identity, procedure to be performed, procedure site and side, and allergy verification. Appropriate equipment was determined to be in place for the procedure.      No sedation was used for this procedure.     In the office suite the patient was placed in seated position with her right   shoulder exposed. The ultrasound probe was placed over the  lateral aspect of the head of the humerus, and the rotator cuff and humeral head were identified. The skin was prepped and draped in the usual sterile fashion. A 22g 3.5 inch needle was placed into skin via a advanced under ultrasound guidance, in-plane approach, into the glenohumeral capsule. Following negative aspiration, approx 5mL of 1% lidocaine and 1mL 4mg/mL of dexamethasone was then injected, and the needle was subsequently removed intact. The patient's shoulder was wiped with a 4x4 gauze, the area was cleansed with alcohol prep, and a bandaid was applied. There were no complications noted.     Mary noted improvement in ROM and decreased pain prior to discharge.    Jayy Kerr MD

## 2020-10-01 ENCOUNTER — OFFICE VISIT (OUTPATIENT)
Dept: PHYSICAL MEDICINE AND REHAB | Facility: MEDICAL CENTER | Age: 50
End: 2020-10-01
Payer: MEDICAID

## 2020-10-01 VITALS
WEIGHT: 115.08 LBS | DIASTOLIC BLOOD PRESSURE: 68 MMHG | TEMPERATURE: 97.7 F | OXYGEN SATURATION: 96 % | HEART RATE: 98 BPM | BODY MASS INDEX: 20.39 KG/M2 | SYSTOLIC BLOOD PRESSURE: 128 MMHG | HEIGHT: 63 IN

## 2020-10-01 DIAGNOSIS — M05.79 RHEUMATOID ARTHRITIS INVOLVING MULTIPLE SITES WITH POSITIVE RHEUMATOID FACTOR (HCC): ICD-10-CM

## 2020-10-01 DIAGNOSIS — F32.A DEPRESSION, UNSPECIFIED DEPRESSION TYPE: ICD-10-CM

## 2020-10-01 DIAGNOSIS — M25.511 RIGHT SHOULDER PAIN, UNSPECIFIED CHRONICITY: ICD-10-CM

## 2020-10-01 DIAGNOSIS — M05.711 RHEUMATOID ARTHRITIS INVOLVING RIGHT SHOULDER WITH POSITIVE RHEUMATOID FACTOR (HCC): ICD-10-CM

## 2020-10-01 DIAGNOSIS — M19.019 ARTHRITIS OF GLENOHUMERAL JOINT: ICD-10-CM

## 2020-10-01 PROCEDURE — 99214 OFFICE O/P EST MOD 30 MIN: CPT | Performed by: PHYSICAL MEDICINE & REHABILITATION

## 2020-10-01 RX ORDER — QUETIAPINE FUMARATE 25 MG/1
TABLET, FILM COATED ORAL
COMMUNITY
Start: 2020-09-02 | End: 2021-02-12

## 2020-10-01 RX ORDER — OXYCODONE AND ACETAMINOPHEN 7.5; 325 MG/1; MG/1
1 TABLET ORAL EVERY 4 HOURS PRN
Qty: 150 TAB | Refills: 0 | Status: SHIPPED | OUTPATIENT
Start: 2020-11-16 | End: 2020-12-15 | Stop reason: SDUPTHER

## 2020-10-01 RX ORDER — ETANERCEPT 50 MG/ML
SOLUTION SUBCUTANEOUS
COMMUNITY
Start: 2020-08-07 | End: 2022-08-31

## 2020-10-01 RX ORDER — OXYCODONE AND ACETAMINOPHEN 7.5; 325 MG/1; MG/1
1 TABLET ORAL EVERY 4 HOURS PRN
Qty: 150 TAB | Refills: 0 | Status: SHIPPED | OUTPATIENT
Start: 2020-10-17 | End: 2020-11-16

## 2020-10-01 ASSESSMENT — PATIENT HEALTH QUESTIONNAIRE - PHQ9
SUM OF ALL RESPONSES TO PHQ QUESTIONS 1-9: 17
CLINICAL INTERPRETATION OF PHQ2 SCORE: 3
5. POOR APPETITE OR OVEREATING: 2 - MORE THAN HALF THE DAYS

## 2020-10-01 ASSESSMENT — PAIN SCALES - GENERAL: PAINLEVEL: 7=MODERATE-SEVERE PAIN

## 2020-10-01 ASSESSMENT — FIBROSIS 4 INDEX: FIB4 SCORE: 0.66

## 2020-10-01 NOTE — PROGRESS NOTES
Follow up patient note  Pain Medicine, Interventional spine and sports physiatry, Physical medicine rehabilitation    Date of Service: 10/01/2020    Chief complaint:   Joint pain      HISTORY    Please see new patient note dated 06/08/2018 by Dr Kerr,  for more details.     [facility]  Patient identification: Mary Martinez 50 y.o. female returns for follow-up of her pain related to rheumatoid arthritis.      Interval history:     Since the last visit, Mary returns for follow-up.  She reports that her right shoulder did not last.  She had relief during the anesthetic period, but pain returned by the next day.  Her right shoulder is significantly painful.  Pain had been very severe prior to injection and now, this continues.  This is limiting her self-care, making it difficult to brush her hair and dress. Surgical referral for consideration of right shoulder arthroplasty is pending.    She sees Dr. Valdovinos next week.  MRI and xray were done today of her neck.  The neck has been somewhat more painful, but the right shoulder is most painful.    She has been taking percocet 7.5/325 five a day, #150 per month and stable.  No bowel or bladder changes.  Bowel movements are regular.    For her Rheumatoid arthritis, she is still on Embrel, this has started to reduce the severity of her overall joint pain, but the right shoulder is significantly painful      PHQ-9: 17, denies suicidality  ORT: 4    ROS  Unchanged from previous visit 09/08/2020 except as noted in the HPI    Red Flags :   Fever, Chills, Sweats: Denies  Involuntary Weight Loss: Denies  Bowel/Bladder Incontinence: Denies      PMHx:   Past Medical History:   Diagnosis Date   • Arthritis     Rheumatoid -follows with rheumatologist. Has several fractures due to RA.   • ASTHMA     inhalers prn   • Bowel habit changes     4/24/20-constipation and diarrhea.   • Bronchitis last approx 2018   • Chronic pain 04/24/2020    Due to RA   • Dental disorder     no teeth  upper-does not wear her denture. Broken teeth on bottom.   • Heart burn     taking famotidine   • Hiatus hernia syndrome    • History of pancreatitis    • Indigestion     taking famotidine   • Pain     everywhere   • Pneumonia 10/2019   • Psychiatric problem     anxiety and depression   • Rheumatoid arthritis(714.0) 2003       PSHx:   Past Surgical History:   Procedure Laterality Date   • PB ENDOSCOPIC US EXAM, ESOPH  4/29/2020    Procedure: EGD, WITH ENDOSCOPIC US;  Surgeon: Jorge Brooke M.D.;  Location: [facility];  Service: Gastroenterology   • GASTROSCOPY-ENDO  4/29/2020    Procedure: GASTROSCOPY;  Surgeon: Jorge Brooke M.D.;  Location: [facility];  Service: Gastroenterology   • EGD WITH ASP/BX  4/29/2020    Procedure: EGD, WITH ASPIRATION BIOPSY - POSS FNA;  Surgeon: Jorge Brooke M.D.;  Location: [facility];  Service: Gastroenterology   • PB EXPLORATORY OF ABDOMEN N/A 10/4/2019    Procedure: LAPAROTOMY, EXPLORATORY AND REPAIR OF DUODENAL PERFORATION;  Surgeon: James Dumont M.D.;  Location: [facility];  Service: General   • COLONOSCOPY  2018    with upper endoscopy   • FINGER ARTHROPLASTY Right 6/5/2017    Procedure: FINGER ARTHROPLASTY - LONG, RING AND SMALL VOLAR PLATE;  Surgeon: Lobo Rosen M.D.;  Location: SURGERY SAME DAY [facility];  Service:    • FINGER AMPUTATION  6/5/2017    Procedure: FINGER AMPUTATION - LONG, RING AND SMALL AT THE PROXIMAL INTERPHALANGEAL JOINT;  Surgeon: Lobo Rosen M.D.;  Location: SURGERY SAME DAY [facility];  Service:    • FINGER ARTHROPLASTY Right 4/17/2017    Procedure: FINGER ARTHROPLASTY ;  Surgeon: Lobo Rosen M.D.;  Location: SURGERY SAME DAY [facility];  Service:    • FINGER AMPUTATION Right 9/14/2016    Procedure: FINGER AMPUTATION INDEX;  Surgeon: Lobo Rosen M.D.;  Location: SURGERY SAME DAY [facility];  Service:    • IRRIGATION &  DEBRIDEMENT ORTHO Right 9/11/2016    Procedure: IRRIGATION & DEBRIDEMENT ORTHO - right index finger;  Surgeon: Madhu Rosen M.D.;  Location: SURGERY [facility];  Service:    • PIP ARTHRODESIS Right 8/29/2016    Procedure: RE-DO INDEX FINGER PROXIMAL INTERPHALANGEAL ARTHRODESIS;  Surgeon: Lobo Rosen M.D.;  Location: SURGERY SAME DAY [facility];  Service:    • BONE GRAFT Right 8/29/2016    Procedure: BONE GRAFT - DISTAL RADIUS ;  Surgeon: Lobo Rosen M.D.;  Location: SURGERY SAME DAY [facility];  Service:    • ARTHRODESIS Right 5/6/2016    Procedure: ARTHRODESIS INDEX FINGER PROXIMAL INTERPHALANGEAL;  Surgeon: Lobo Rsoen M.D.;  Location: SURGERY SAME DAY [facility];  Service:    • FOOT RECONSTRUCTION RHEUMATIC Left 7/29/2015    Procedure: FOOT RECONSTRUCTION RHEUMATIC;  Surgeon: Heriberto Alves M.D.;  Location: SURGERY [facility];  Service:    • TOE FUSION Right 5/27/2015    Procedure: TOE FUSION 1ST METATARSALPHALANGEAL JOINT;  Surgeon: Heriberto Alves M.D.;  Location: SURGERY [facility];  Service:    • BONE SPUR EXCISION  5/27/2015    Procedure: BONE SPUR EXCISION METATARSAL HEAD 2-3;  Surgeon: Heriberto Alves M.D.;  Location: [facility];  Service:    • HAMMERTOE CORRECTION  5/27/2015    Procedure: HAMMERTOE CORRECTION AND SOFT TISSUE RE-ALINGMENT 2-3 ;  Surgeon: Heriberto Alves M.D.;  Location: [facility];  Service:    • DENTAL EXTRACTION(S)  2014    all of upper teeth   • ABDOMINAL ABSCESS DRAINAGE  9/27/2011    Performed by VERO WRIGHT at [facility]   • EMANUEL BY LAPAROSCOPY  9/27/2011    Performed by VERO WRIGHT at [facility]   • APPENDECTOMY  2011    Found out it had ruptured prior to abcess surgery   • REPEAT C SECTION  2008   • REPEAT C SECTION  2005   • REPEAT C SECTION  1999   • PRIMARY C SECTION  1991   • TONSILLECTOMY  1982   • WRIST EXPLORATION Left 1980's    fractured wrist-no  hardware       Family history   History reviewed. No pertinent family history.      Medications:   Outpatient Medications Marked as Taking for the 10/1/20 encounter (Office Visit) with Jayy Kerr M.D.   Medication Sig Dispense Refill   • [START ON 10/17/2020] oxyCODONE-acetaminophen (PERCOCET) 7.5-325 MG per tablet Take 1 Tab by mouth every four hours as needed for up to 30 days. 150 Tab 0   • Diclofenac Sodium 1 % Gel Apply 2 g to skin as directed 4 times a day for 30 days. 80 g 2   • [START ON 11/16/2020] oxyCODONE-acetaminophen (PERCOCET) 7.5-325 MG per tablet Take 1 Tab by mouth every four hours as needed for up to 30 days. 150 Tab 0   • Misc. Devices Misc 1 Device by Does not apply route 1 time daily as needed (Dx: severe right shoulder arthritis, chronic rotator cuff syndrome   Dispense 1 shoulder immobilizer for right shoulder) for up to 30 days. 1 Each 0   • colestipol (COLESTID) 1 GM Tab Take 1 g by mouth 2 times a day.     • predniSONE (DELTASONE) 10 MG Tab Take 10 mg by mouth every day.     • famotidine (PEPCID) 40 MG Tab Take 40 mg by mouth every bedtime.     • SPIRIVA RESPIMAT 1.25 MCG/ACT Aero Soln Inhale 1 Puff by mouth every morning.     • ondansetron (ZOFRAN ODT) 8 MG TABLET DISPERSIBLE Take 8 mg by mouth every 8 hours as needed.     • QUEtiapine (SEROQUEL) 100 MG Tab Take 100 mg by mouth every evening.     • PARoxetine (PAXIL) 30 MG Tab Take 60 mg by mouth every evening.     • albuterol (VENTOLIN OR PROVENTIL) 108 (90 BASE) MCG/ACT AERS inhalation aerosol Inhale 2 Puffs by mouth every four hours as needed for Shortness of Breath.         Allergies:   Allergies   Allergen Reactions   • Penicillins Anaphylaxis and Hives     Tolerates cephalosporins; reports throat swelling with PCN   • Nitrous Oxide Vomiting   • Aripiprazole [Abilify] Nausea     Spasms, shaking       Social Hx:   Social History     Socioeconomic History   • Marital status:      Spouse name: Not on file   • Number of  "children: Not on file   • Years of education: Not on file   • Highest education level: Not on file   Occupational History   • Not on file   Social Needs   • Financial resource strain: Not on file   • Food insecurity     Worry: Not on file     Inability: Not on file   • Transportation needs     Medical: Not on file     Non-medical: Not on file   Tobacco Use   • Smoking status: Current Every Day Smoker     Packs/day: 1.00     Years: 30.00     Pack years: 30.00     Types: Cigarettes   • Smokeless tobacco: Never Used   • Tobacco comment: 4/24/20-now smoking approx 1/2 PPD.   Substance and Sexual Activity   • Alcohol use: Never     Frequency: Never     Binge frequency: Never   • Drug use: Never   • Sexual activity: Not on file   Lifestyle   • Physical activity     Days per week: Not on file     Minutes per session: Not on file   • Stress: Not on file   Relationships   • Social connections     Talks on phone: Not on file     Gets together: Not on file     Attends [employer] service: Not on file     Active member of club or organization: Not on file     Attends meetings of clubs or organizations: Not on file     Relationship status: Not on file   • Intimate partner violence     Fear of current or ex partner: Not on file     Emotionally abused: Not on file     Physically abused: Not on file     Forced sexual activity: Not on file   Other Topics Concern   •  Service No   • Blood Transfusions Yes   • Caffeine Concern No   • Occupational Exposure No   • Hobby Hazards No   • Sleep Concern No   • Stress Concern Yes   • Weight Concern No   • Special Diet No   • Back Care No   • Exercise Yes   • Bike Helmet Yes   • Seat Belt Yes   • Self-Exams Yes   Social History Narrative    ** Merged History Encounter **              EXAMINATION     Physical Exam:   Vitals: /68 (BP Location: Left arm, Patient Position: Sitting, BP Cuff Size: Adult long)   Pulse 98   Temp 36.5 °C (97.7 °F) (Temporal)   Ht 1.6 m (5' 3\")   Wt 52.2 " kg (115 lb 1.3 oz)   SpO2 96%     Constitutional:   Body Habitus: Body mass index is 20.39 kg/m².  Cooperation: Fully cooperates with exam  Appearance: Well-groomed no disheveled, in no acute distress  Respiratory-  breathing comfortable on room air, no audible wheezing  Cardiovascular- No lower extremity edema is observed  Psychiatric- alert and oriented ×3. Normal affect.     Musculoskeletal:  Right shoulder with crepitus and pain with ROM.  Positive Hawkin's test.  Positive Neer's test.  No skin changes near the right shoulder injection site, no warmth or erythema noted.    Pain limits manual muscle testing in the upper extremities, right: shoulder abduction is at least 3/5, elbow flexion 4/5 on the right, elbow extension 5/5, no focal motor deficits on the left.  Partial hand amputations limit wrist extension, , interossei.    Partial hand amputation on the right at the MCPs; ulnar deviation on the left with MCP subluxation, mild atrophy of the hand intrinsics with wasting of the thenar eminence.     Functional ROM of the cervical spine with pain at end range of motion.    Sensation is grossly intact in the upper extremities bilaterally    Gait is steady without assist device.        MEDICAL DECISION MAKING    DATA    Labs: UDS 10/30/2018 consistent with medications.  Oxycodone and metabolites without other substances   UDS 04/19/2019 consistent with medications. Oxycodone and metabolites without other substances   UDS 07/19/2019 consistent with medications.  Oxycodone and metabolites without other substances   UDS 11/22/2019 consistent with medications.  Oxycodone and metabolites without other substances   UDS 02/20/2020 absent for Oxycodone and metabolites without other substances  UDS 06/10/2020 positive for oxycodone and metabolites without other substances  UDS 08/18/2020 positive for oxycodone and metabolites, positive for EtG without EtS    Imaging:    Films reviewed.  These are my reads:    Xray  right shoulder 08/20/2020  There is note of severe degenerative change of the glenohumeral joint.  No fracture.  Erosive changes, consistent with known history of RA    MRI cervical spine 07/31/2018:    There is is note of motion at the atlantodental level with 5mm atlantodental inverval in flexion that reduces to 1-2 mm in extension.  Mild retrolisthesis of C4 on C5 and anterolisthesis of C5- on C6.  Disc extrusion at C6-7 with mild central canal stenosis    Xray left shoulder 2/5/2018 Humeral head is elevated.  Glenohumeral joint arthritis.    Reports reviewed:    Xray right shoulder 08/20/2020 [facility]  Impression  Extensive erosive changes of the humeral head and degenerative changes of the glenohumeral joint.  Findings are concerning for inflammatory arthropathy such as rheumatoid arthritis.      Xray cervical spine 11/14/2018  1. No acute fracture  2. Persistent motion at the C1-2 joint as before, suggesting atlantoaxial instability  3. Minimal instability noted at C5-6 level as described.    MRI cervical spine 07/31/2018  1. Widening of the atlantodental interal in neutral and flexion positions with a 5mm space which reduces to 1-2 mm in extension  2. 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 Arthritis of glenohumeral joint  M19.019   2. Right shoulder pain, unspecified chronicity  M25.511   3. Rheumatoid arthritis involving multiple sites with positive rheumatoid factor (HCC)  M05.79   4. Rheumatoid arthritis involving right shoulder with positive rheumatoid factor (HCC)  M05.711   5. Depression, unspecified depression type  F32.9         ASSESSMENT and PLAN:     Mary Meera Martinez 50 y.o. female with rheumatoid arthritis    Mary was seen today for follow-up.    Orders and  management for this visit:    Orders Placed This Encounter   • MR-SHOULDER-W/O RIGHT   • Patient has been identified as having a positive depression screening. Appropriate orders and counseling have been given.   • QUEtiapine (SEROQUEL) 25 MG Tab   • ENBREL 50 MG/ML Solution Prefilled Syringe   • oxyCODONE-acetaminophen (PERCOCET) 7.5-325 MG per tablet   • Diclofenac Sodium 1 % Gel   • oxyCODONE-acetaminophen (PERCOCET) 7.5-325 MG per tablet   • Consent for Opiate Prescription   • Controlled Substance Treatment Agreement       1. Discussed that she will follow-up with orthopedic referral placed previously.  She got relief during the anesthetic period only for right glenohumeral joint injection.  MRI of the right shoulder discussed.  2. Repeat EMG discussed.  Plan to get records from Dr. Valdovinos's office visit (upcoming) and from diagnostic studies performed at [facility].  3. No order was placed for enbrel or quetiapine.  These were only updates to her medication list from outside pharmacy reconciliation.  4. Continue percocet 7.5/325 #150 per month.   reviewed.  Will continue to monitor.  5. Continue Embrel and care with Dr. Dela Cruz.  6. Continue follow-up with PCP  7. Continue diclofenac gel for shoulders.  Discussed using 2g up to four times a day.  She will allow the gel to dry for 10 minutes prior to covering.  This does give some short-term relief.  Refilled today.  8. Continue follow-up with Psychology and Psychiatry.       In prescribing controlled substances to this patient, I certify that I have obtained and reviewed the medical history of Mary Martinez. I have also made a good aristides effort to obtain applicable records from other providers who have treated the patient and records did not demonstrate any increased risk of substance abuse that would prevent me from prescribing controlled substances.     I have conducted a physical exam and documented it. I have reviewed Ms. Martinez’s  prescription history as maintained by the [place] Prescription Monitoring Program.   ORT 4, indicates moderate risk    I have assessed the patient’s risk for abuse, dependency, and addiction using the validated Opioid Risk Tool available at https://www.mdcalc.com/klqtbk-rpex-yvnn-ort-narcotic-abuse.     Given the above, I believe the benefits of controlled substance therapy outweigh the risks. The reasons for prescribing controlled substances include non-narcotic, oral analgesic alternatives have been inadequate for pain control and in my professional opinion, controlled substances are the only reasonable choice for this patient because her pain was note adequately controlled with alternatives and she has chronic progressive disease. Accordingly, I have discussed the risk and benefits, treatment plan, and alternative therapies with the patient.         Follow up: 2 months, prn      Please note that this dictation was created using voice recognition software. I have made every reasonable attempt to correct obvious errors but there may be errors of grammar and content that I may have overlooked prior to finalization of this note.      Jayy Kerr MD  Interventional Spine and Sports Physiatry  Physical Medicine and Rehabilitation  [facility]

## 2020-10-07 ENCOUNTER — APPOINTMENT (OUTPATIENT)
Dept: RADIOLOGY | Facility: MEDICAL CENTER | Age: 50
End: 2020-10-07
Attending: PHYSICAL MEDICINE & REHABILITATION
Payer: MEDICAID

## 2020-10-09 ENCOUNTER — HOSPITAL ENCOUNTER (OUTPATIENT)
Dept: RADIOLOGY | Facility: MEDICAL CENTER | Age: 50
End: 2020-10-09
Attending: PHYSICAL MEDICINE & REHABILITATION
Payer: MEDICAID

## 2020-10-09 DIAGNOSIS — M25.511 RIGHT SHOULDER PAIN, UNSPECIFIED CHRONICITY: ICD-10-CM

## 2020-10-09 DIAGNOSIS — M05.711 RHEUMATOID ARTHRITIS INVOLVING RIGHT SHOULDER WITH POSITIVE RHEUMATOID FACTOR (HCC): ICD-10-CM

## 2020-10-09 DIAGNOSIS — M19.019 ARTHRITIS OF GLENOHUMERAL JOINT: ICD-10-CM

## 2020-10-09 PROCEDURE — 73221 MRI JOINT UPR EXTREM W/O DYE: CPT | Mod: RT

## 2020-10-15 ENCOUNTER — TELEPHONE (OUTPATIENT)
Dept: PHYSICAL MEDICINE AND REHAB | Facility: MEDICAL CENTER | Age: 50
End: 2020-10-15

## 2020-10-15 NOTE — TELEPHONE ENCOUNTER
Patient called and she stated the [facility] where she refill her pain medication is not longer accepting her Insurance anymore and she wanted to know what to do  I called her back and advises she needs to get the Rx's remain in [facility] an take it to the pharmacy she will pick and also I mentioned on her next appointment we will change the pharmacy in the Consent. RR

## 2020-10-15 NOTE — PROGRESS NOTES
Follow up patient note  Interventional spine and sports physiatry, Physical medicine rehabilitation      Chief complaint:   Chief Complaint   Patient presents with   • Follow-Up     Back pain         HISTORY    Please see new patient note by Dr Gore,  for more details.     HPI  Patient identification: Mary Martinez 49 y.o. female  With Diagnoses of Rheumatoid arthritis involving multiple sites with positive rheumatoid factor (HCC), Chronic, continuous use of opioids, Shoulder arthritis, Pain in both knees, unspecified chronicity, Duodenal perforation (HCC), and Status post abdominal surgery, follow-up exam were pertinent to this visit.     The patient has chronic pain secondary to rheumatoid arthritis with pain in the wrists, shoulders and neck with intermittent swelling and significant pain.  Lyrica had been discontinued and the patient was tolerating gabapentin.  Taking Percocet 5/325 every 6 hours which is helping to keep her symptoms under control.    Still complaining of post surgery pain in the abdomen, also with hip pain worsening in the bilateral hips since surgery. Sharp and aching. Constant. She has had worsening of pain since surgery. 6/10 pain in the hips. Deep abdominal pain, cramping, moderate intensity.      I reviewed the previous notes from Dr. Jayy Kerr.  This includes the most recent note from 9/12/2019. I also reviewed the discharge summary from Ekta Bangura MD. from 10/14/2019.  The patient was admitted to the hospital, had an emergent exploratory laparotomy revealing duodenal perforation status post repair 10/4/2019 treated in the ICU.  Patient was reported stable on discharge.       ROS Red Flags :   Fever, Chills, Sweats: Denies  Involuntary Weight Loss: Denies  Bowel/Bladder Incontinence: Denies  Saddle Anesthesia: Denies        PMHx:   Past Medical History:   Diagnosis Date   • ASTHMA     inhalers prn   • Dental disorder     upper partial   • Pain     everywhere   • Psychiatric  problem     anxiety   • Rheumatoid arthritis(714.0) 2003       PSHx:   Past Surgical History:   Procedure Laterality Date   • PB EXPLORATORY OF ABDOMEN N/A 10/4/2019    Procedure: LAPAROTOMY, EXPLORATORY AND REPAIR OF DUODENAL PERFORATION;  Surgeon: James Dumont M.D.;  Location: [facility];  Service: General   • FINGER ARTHROPLASTY Right 6/5/2017    Procedure: FINGER ARTHROPLASTY - LONG, RING AND SMALL VOLAR PLATE;  Surgeon: Lobo Rosen M.D.;  Location: SURGERY SAME DAY [facility];  Service:    • FINGER AMPUTATION  6/5/2017    Procedure: FINGER AMPUTATION - LONG, RING AND SMALL AT THE PROXIMAL INTERPHALANGEAL JOINT;  Surgeon: Lobo Rosen M.D.;  Location: SURGERY SAME DAY [facility];  Service:    • FINGER ARTHROPLASTY Right 4/17/2017    Procedure: FINGER ARTHROPLASTY ;  Surgeon: Lobo Rosen M.D.;  Location: SURGERY SAME DAY [facility];  Service:    • FINGER AMPUTATION Right 9/14/2016    Procedure: FINGER AMPUTATION INDEX;  Surgeon: Lobo Rosen M.D.;  Location: SURGERY SAME DAY [facility];  Service:    • IRRIGATION & DEBRIDEMENT ORTHO Right 9/11/2016    Procedure: IRRIGATION & DEBRIDEMENT ORTHO - right index finger;  Surgeon: Madhu Rosen M.D.;  Location: [facility];  Service:    • PIP ARTHRODESIS Right 8/29/2016    Procedure: RE-DO INDEX FINGER PROXIMAL INTERPHALANGEAL ARTHRODESIS;  Surgeon: Lobo Rosen M.D.;  Location: SURGERY SAME DAY [facility];  Service:    • BONE GRAFT Right 8/29/2016    Procedure: BONE GRAFT - DISTAL RADIUS ;  Surgeon: Lobo Rosen M.D.;  Location: SURGERY SAME DAY [facility];  Service:    • ARTHRODESIS Right 5/6/2016    Procedure: ARTHRODESIS INDEX FINGER PROXIMAL INTERPHALANGEAL;  Surgeon: Lobo Rosen M.D.;  Location: SURGERY SAME DAY [facility];  Service:    • FOOT RECONSTRUCTION RHEUMATIC Left 7/29/2015    Procedure: FOOT RECONSTRUCTION RHEUMATIC;  Surgeon: Heriberto Alves  M.D.;  Location: SURGERY [facility];  Service:    • TOE FUSION Right 2015    Procedure: TOE FUSION 1ST METATARSALPHALANGEAL JOINT;  Surgeon: Heriberto Alves M.D.;  Location: SURGERY [facility];  Service:    • BONE SPUR EXCISION  2015    Procedure: BONE SPUR EXCISION METATARSAL HEAD 2-3;  Surgeon: Heriberto Alves M.D.;  Location: SURGERY [facility];  Service:    • HAMMERTOE CORRECTION  2015    Procedure: HAMMERTOE CORRECTION AND SOFT TISSUE RE-ALINGMENT 2-3 ;  Surgeon: Heriberto Alves M.D.;  Location: SURGERY [facility];  Service:    • EMANUEL BY LAPAROSCOPY  2011    Performed by VERO WRIGHT at [facility]   • ABDOMINAL ABSCESS DRAINAGE  2011    Performed by VERO WRIGHT at [facility]   • OTHER  1982    tonsils   • APPENDECTOMY     • CHOLECYSTECTOMY     • GYN SURGERY      C section X 4   • OTHER ABDOMINAL SURGERY      small colon block   • PB  DELIVERY ONLY      x 4   • TONSILLECTOMY     • WRIST ORIF         Family history   History reviewed. No pertinent family history.      Medications:   Outpatient Medications Marked as Taking for the 10/17/19 encounter (Office Visit) with Jaylan Gore M.D.   Medication Sig Dispense Refill   • oxyCODONE immediate-release (ROXICODONE) 5 MG Tab Take 1-2 Tabs by mouth every 6 hours as needed for Severe Pain for up to 7 days. 49 Tab 0   • nicotine (NICODERM) 21 MG/24HR PATCH 24 HR Apply 1 Patch to skin as directed every 24 hours. 30 Patch 2   • omeprazole (PRILOSEC) 20 MG delayed-release capsule Take 1 Cap by mouth every 12 hours. 60 Cap 2   • levoFLOXacin (LEVAQUIN) 250 MG Tab Take 3 Tabs by mouth every day for 7 days. 21 Tab 0   • oxyCODONE-acetaminophen (PERCOCET) 5-325 MG Tab Take 1 Tab by mouth every 6 hours as needed for Severe Pain for up to 30 days. 120 Tab 0   • gabapentin (NEURONTIN) 800 MG tablet Take 1 Tab by mouth 4 times a day for 90 days. 360 Tab 0   • QUEtiapine (SEROQUEL) 100  MG Tab Take 100 mg by mouth every evening.     • PARoxetine (PAXIL) 30 MG Tab Take 60 mg by mouth every evening.     • albuterol (VENTOLIN OR PROVENTIL) 108 (90 BASE) MCG/ACT AERS inhalation aerosol Inhale 2 Puffs by mouth every four hours as needed for Shortness of Breath.          Current Outpatient Medications on File Prior to Visit   Medication Sig Dispense Refill   • nicotine (NICODERM) 21 MG/24HR PATCH 24 HR Apply 1 Patch to skin as directed every 24 hours. 30 Patch 2   • omeprazole (PRILOSEC) 20 MG delayed-release capsule Take 1 Cap by mouth every 12 hours. 60 Cap 2   • levoFLOXacin (LEVAQUIN) 250 MG Tab Take 3 Tabs by mouth every day for 7 days. 21 Tab 0   • oxyCODONE-acetaminophen (PERCOCET) 5-325 MG Tab Take 1 Tab by mouth every 6 hours as needed for Severe Pain for up to 30 days. 120 Tab 0   • gabapentin (NEURONTIN) 800 MG tablet Take 1 Tab by mouth 4 times a day for 90 days. 360 Tab 0   • QUEtiapine (SEROQUEL) 100 MG Tab Take 100 mg by mouth every evening.     • PARoxetine (PAXIL) 30 MG Tab Take 60 mg by mouth every evening.     • albuterol (VENTOLIN OR PROVENTIL) 108 (90 BASE) MCG/ACT AERS inhalation aerosol Inhale 2 Puffs by mouth every four hours as needed for Shortness of Breath.     • metroNIDAZOLE (FLAGYL) 500 MG Tab Take 1 Tab by mouth 3 times a day for 7 days. (Patient not taking: Reported on 10/17/2019) 21 Tab 0   • alendronate (FOSAMAX) 70 MG Tab Take 70 mg by mouth every Tuesday.       No current facility-administered medications on file prior to visit.          Allergies:   Allergies   Allergen Reactions   • Penicillins Anaphylaxis and Hives     Tolerates cephalosporins; reports throat swelling with PCN   • Nitrous Oxide Vomiting   • Aripiprazole [Abilify] Nausea     Spasms, shaking       Social Hx:   Social History     Socioeconomic History   • Marital status:      Spouse name: Not on file   • Number of children: Not on file   • Years of education: Not on file   • Highest education  "level: Not on file   Occupational History   • Not on file   Social Needs   • Financial resource strain: Not on file   • Food insecurity:     Worry: Not on file     Inability: Not on file   • Transportation needs:     Medical: Not on file     Non-medical: Not on file   Tobacco Use   • Smoking status: Current Every Day Smoker     Packs/day: 1.00     Years: 30.00     Pack years: 30.00     Types: Cigarettes   • Smokeless tobacco: Never Used   • Tobacco comment: 1 ppd   Substance and Sexual Activity   • Alcohol use: No   • Drug use: No   • Sexual activity: Not on file   Lifestyle   • Physical activity:     Days per week: Not on file     Minutes per session: Not on file   • Stress: Not on file   Relationships   • Social connections:     Talks on phone: Not on file     Gets together: Not on file     Attends Yazidi service: Not on file     Active member of club or organization: Not on file     Attends meetings of clubs or organizations: Not on file     Relationship status: Not on file   • Intimate partner violence:     Fear of current or ex partner: Not on file     Emotionally abused: Not on file     Physically abused: Not on file     Forced sexual activity: Not on file   Other Topics Concern   •  Service No   • Blood Transfusions Yes   • Caffeine Concern No   • Occupational Exposure No   • Hobby Hazards No   • Sleep Concern No   • Stress Concern Yes   • Weight Concern No   • Special Diet No   • Back Care No   • Exercise Yes   • Bike Helmet Yes   • Seat Belt Yes   • Self-Exams Yes   Social History Narrative    ** Merged History Encounter **              EXAMINATION     Physical Exam:   Vitals: BP (!) 92/58 (BP Location: Right arm, Patient Position: Sitting, BP Cuff Size: Adult)   Pulse 99   Temp 36.5 °C (97.7 °F) (Temporal)   Ht 1.6 m (5' 3\")   Wt 53.9 kg (118 lb 13.3 oz)   SpO2 95%     Constitutional:   Body Habitus: Body mass index is 21.05 kg/m².  Cooperation: Fully cooperates with exam  Appearance: " Well-groomed no disheveled    Respiratory-  breathing comfortable on room air, no audible wheezing  Cardiovascular- capillary refills less than 2 seconds. No lower extremity edema is noted.   Psychiatric- alert and oriented ×3. Normal affect.    ABD -surgical incision with no signs of infection currently.  Staples in place.  Further remainder of abdominal exam.          MEDICAL DECISION MAKING    DATA    Labs:   Lab Results   Component Value Date/Time    SODIUM 130 (L) 10/14/2019 04:30 AM    POTASSIUM 4.1 10/14/2019 04:30 AM    CHLORIDE 97 10/14/2019 04:30 AM    CO2 24 10/14/2019 04:30 AM    GLUCOSE 102 (H) 10/14/2019 04:30 AM    BUN 5 (L) 10/14/2019 04:30 AM    CREATININE 0.91 10/14/2019 04:30 AM    CREATININE 0.7 11/29/2008 09:05 PM        Lab Results   Component Value Date/Time    PROTHROMBTM 17.2 (H) 10/06/2019 03:36 PM    INR 1.36 (H) 10/06/2019 03:36 PM        Lab Results   Component Value Date/Time    WBC 12.8 (H) 10/13/2019 03:35 AM    RBC 3.16 (L) 10/13/2019 03:35 AM    HEMOGLOBIN 10.6 (L) 10/13/2019 03:35 AM    HEMATOCRIT 32.4 (L) 10/13/2019 03:35 AM    .5 (H) 10/13/2019 03:35 AM    MCH 33.5 (H) 10/13/2019 03:35 AM    MCHC 32.7 (L) 10/13/2019 03:35 AM    MPV 10.5 10/13/2019 03:35 AM    NEUTSPOLYS 67.90 10/11/2019 05:35 AM    LYMPHOCYTES 18.20 (L) 10/11/2019 05:35 AM    MONOCYTES 10.90 10/11/2019 05:35 AM    EOSINOPHILS 1.50 10/11/2019 05:35 AM    BASOPHILS 0.40 10/11/2019 05:35 AM    HYPOCHROMIA 1+ 10/21/2011 08:00 AM    ANISOCYTOSIS 1+ 10/10/2019 03:49 AM        Lab Results   Component Value Date/Time    HBA1C 5.7 (H) 09/10/2016 08:27 PM          Imaging:   I personally reviewed following images      I reviewed the following radiology reports                                 Results for orders placed in visit on 07/09/19   MR-CERVICAL SPINE-W/O      Results for orders placed in visit on 11/14/18   MR-CERVICAL SPINE-WITH         Results for orders placed in visit on 01/18/19   MR-LUMBAR SPINE-W/O                 Results for orders placed in visit on 01/18/19   MR-LUMBAR SPINE-W/O                                                  Results for orders placed during the hospital encounter of 10/04/19   CT-ABDOMEN-PELVIS W/O    Impression Interval resolving retroperitoneal soft tissue air. Small residual extraluminal air within the right upper quadrant.  Right upper quadrant retroperitoneal, paraduodenal, and perinephric fluid collections consistent with sequela of duodenal perforation. Infected fluid not excluded.  Postoperative changes ventral abdominal wall.  No evidence of bowel obstruction.  Punctate bilateral nonobstructing renal stones. No hydronephrosis.  Right lower lobe and to lesser extent lingular and left lower lobe atelectasis/possible pneumonitis/pneumonia.  Trace right basilar pleural effusion.  2.6 mm right middle lobe nodule.    Nodule less than 6 mm.  Low Risk: No routine follow-up    High Risk: Optional CT at 12 months    Comments: Nodules less than 6 mm do not require routine follow-up, but certain patients at high risk with suspicious nodule morphology, upper lobe location, or both may warrant 12-month follow-up.    Low Risk - Minimal or absent history of smoking and of other known risk factors.    High Risk - History of smoking or of other known risk factors.    Note: These recommendations do not apply to lung cancer screening, patients with immunosuppression, or patients with known primary cancer.    Fleischner Society 2017 Guidelines for Management of Incidentally Detected Pulmonary Nodules in Adults          Results for orders placed during the hospital encounter of 10/04/19   CT-ABDOMEN-PELVIS WITH    Addendum Addendum: There is focus of air seen adjacent to the second portion of the  duodenum, the duodenal wall appears slightly thickened. Consider duodenal  ulcer/perforation as etiology of these findings.  These findings were discussed with the patient's clinician, MARSHALL DEL VALLE,  on 10/4/2019  7:27 AM.      Petey Wray M.D. 10/4/2019  7:30 AM          Impression 1.  Soft tissue gas in the perinephric space of the right kidney tracking into the retroperitoneum posterior to the liver with small quantity of free fluid in the perinephric space, of uncertain etiology. This appearance in the extremity soft tissues   would be concerning for necrotizing fasciitis: consider retroperitoneal necrotizing fasciitis which is very rare but has been reported in the literature.  2.  Fat stranding adjacent to the pancreatic head tracking into the right paracolic gutter, appearance suggests possible changes of pancreatitis.  3.  Right nephrolithiasis without visualized obstructive change.  4.  Common bile duct dilatation, commonly associated with postcholecystectomy physiology. Consider causes of biliary obstruction with additional workup as clinically appropriate.    These findings were discussed with the patient's clinician, MARSHALL DEL VALLE, on 10/4/2019 6:53 AM.                                   Results for orders placed in visit on 07/09/19   DX-CERVICAL SPINE-4+ VIEWS        Results for orders placed during the hospital encounter of 05/27/11   DX-CHEST-2 VIEWS    Impression NEGATIVE TWO VIEWS OF THE CHEST.                Results for orders placed in visit on 06/14/19   DX-FINGER(S) 2+ RIGHT    Impression Amputations second through fifth fingers.  Findings concerning for osteomyelitis involving the third finger proximal phalanx remnant.  Marked erosive arthritic changes.  Cortical thickening/old periosteal reaction involving the metacarpals.      Results for orders placed during the hospital encounter of 07/29/15   DX-FOOT-2- LEFT    Impression Fluoroscopic spot films obtained during left foot surgery.                                                      Results for orders placed during the hospital encounter of 02/05/18   DX-SHOULDER 2+ LEFT    Impression No acute fracture of the proximal left humerus  identified.  Findings consistent with erosive arthritic changes involving the glenohumeral joint.  Humeral head elevated relative to the glenoid possibly related to rotator cuff tear.                     DIAGNOSIS   Visit Diagnoses     ICD-10-CM   1. Rheumatoid arthritis involving multiple sites with positive rheumatoid factor (HCC) M05.79   2. Chronic, continuous use of opioids F11.90   3. Shoulder arthritis M19.019   4. Pain in both knees, unspecified chronicity M25.561    M25.562   5. Duodenal perforation (HCC) K63.1   6. Status post abdominal surgery, follow-up exam Z09         ASSESSMENT and PLAN:     Mary Martinez 49 y.o. female      Mary was seen today for follow-up.    Diagnoses and all orders for this visit:    Rheumatoid arthritis involving multiple sites with positive rheumatoid factor (HCC)  -     oxyCODONE immediate-release (ROXICODONE) 5 MG Tab; Take 1-2 Tabs by mouth every 6 hours as needed for Severe Pain for up to 7 days.    Chronic, continuous use of opioids  -     oxyCODONE immediate-release (ROXICODONE) 5 MG Tab; Take 1-2 Tabs by mouth every 6 hours as needed for Severe Pain for up to 7 days.    Shoulder arthritis  -     oxyCODONE immediate-release (ROXICODONE) 5 MG Tab; Take 1-2 Tabs by mouth every 6 hours as needed for Severe Pain for up to 7 days.    Pain in both knees, unspecified chronicity  -     oxyCODONE immediate-release (ROXICODONE) 5 MG Tab; Take 1-2 Tabs by mouth every 6 hours as needed for Severe Pain for up to 7 days.    Duodenal perforation (HCC)  -     oxyCODONE immediate-release (ROXICODONE) 5 MG Tab; Take 1-2 Tabs by mouth every 6 hours as needed for Severe Pain for up to 7 days.    Status post abdominal surgery, follow-up exam  -     oxyCODONE immediate-release (ROXICODONE) 5 MG Tab; Take 1-2 Tabs by mouth every 6 hours as needed for Severe Pain for up to 7 days.    Tobacco dependence    Other orders  -     Consent for Opiate Prescription      I advised quitting  smoking and we discussed the health benefits of quitting smoking.  The patient is cut down to 3 cigarettes and we discussed that she is almost there.  I also provided information for QUIT tobacco program at [facility]. The patient was instructed to call [phone] or to visit our website at [email or web address]. We discussed the patient may be a candidate for counseling through the program. This discussion was 3 minutes of counseling.      Total number tablets 49 for 7 days.  We discussed tapering down to her baseline of oxycodone 5 mg 4 times daily as soon as possible.  Patient will follow-up with Dr. holcomb.    I also discussed the case with the patient's PCP Fidencio Christopher D.O. there was a preference from the surgery team to remove the acetaminophen from her pain medications.  I agree to see the patient on urgent basis for pain control.  Patient will follow up with her primary pain physician Dr. Jayy Holcomb when she returns from vacation.      Last urine drug screen: Urine drug screen 7/19/2019 consistent.  Last controlled substance agreement: 9/12/2019 with Dr. holcomb who is in our practice   reviewed: 10/17/2019   Naloxone prescribed: yes the patient has this at home and knows how to use it.       Opioid Risk Score: 4    Interpretation of Opioid Risk Score   Score 0-3 = Low risk of abuse. Do UDS at least once per year.  Score 4-7 = Moderate risk of abuse. Do UDS 1-4 times per year.  Score 8+ = High risk of abuse. Refer to specialist.     I reviewed the     In prescribing controlled substances to this patient, I certify that I have obtained and reviewed the medical history of Mary Estes Martinez. I have also made a good aristides effort to obtain applicable records from other providers who have treated the patient and records did not demonstrate any increased risk of substance abuse that would prevent me from prescribing controlled substances.     I have conducted a physical exam and documented it. I have  reviewed Ms. Martinez’s prescription history as maintained by the [place] Prescription Monitoring Program.     I have assessed the patient’s risk for abuse, dependency, and addiction using the validated Opioid Risk Tool available at https://www.mdcalc.com/pjukkv-mpbo-mwgy-ort-narcotic-abuse.     Given the above, I believe the benefits of controlled substance therapy outweigh the risks. The reasons for prescribing controlled substances include non-narcotic, oral analgesic alternatives have been inadequate for pain control. Accordingly, I have discussed the risk and benefits, treatment plan, and alternative therapies with the patient.               Follow up: with Dr Kerr    Thank you for allowing me to participate in the care of this patient. If you have any questions please not hesitate to contact me.          Please note that this dictation was created using voice recognition software. I have made every reasonable attempt to correct obvious errors but there may be errors of grammar and content that I may have overlooked prior to finalization of this note.      Jaylan Gore MD  Interventional Spine and Sports Physiatry  Physical Medicine and Rehabilitation  [facility]        Improved.

## 2020-12-15 ENCOUNTER — OFFICE VISIT (OUTPATIENT)
Dept: PHYSICAL MEDICINE AND REHAB | Facility: MEDICAL CENTER | Age: 50
End: 2020-12-15
Payer: MEDICAID

## 2020-12-15 VITALS
WEIGHT: 117.28 LBS | HEART RATE: 62 BPM | HEIGHT: 63 IN | OXYGEN SATURATION: 98 % | TEMPERATURE: 97.5 F | BODY MASS INDEX: 20.78 KG/M2 | SYSTOLIC BLOOD PRESSURE: 110 MMHG | DIASTOLIC BLOOD PRESSURE: 68 MMHG

## 2020-12-15 DIAGNOSIS — M53.2X1 ATLANTOAXIAL INSTABILITY: ICD-10-CM

## 2020-12-15 DIAGNOSIS — Z74.09 IMPAIRED MOBILITY AND ADLS: ICD-10-CM

## 2020-12-15 DIAGNOSIS — F11.90 CHRONIC, CONTINUOUS USE OF OPIOIDS: ICD-10-CM

## 2020-12-15 DIAGNOSIS — Z78.9 IMPAIRED MOBILITY AND ADLS: ICD-10-CM

## 2020-12-15 DIAGNOSIS — M19.019 ARTHRITIS OF GLENOHUMERAL JOINT: ICD-10-CM

## 2020-12-15 DIAGNOSIS — F32.A DEPRESSION, UNSPECIFIED DEPRESSION TYPE: ICD-10-CM

## 2020-12-15 DIAGNOSIS — M05.79 RHEUMATOID ARTHRITIS INVOLVING MULTIPLE SITES WITH POSITIVE RHEUMATOID FACTOR (HCC): ICD-10-CM

## 2020-12-15 PROCEDURE — 99214 OFFICE O/P EST MOD 30 MIN: CPT | Performed by: PHYSICAL MEDICINE & REHABILITATION

## 2020-12-15 RX ORDER — OXYCODONE AND ACETAMINOPHEN 7.5; 325 MG/1; MG/1
1 TABLET ORAL EVERY 4 HOURS PRN
Qty: 150 TAB | Refills: 0 | Status: SHIPPED | OUTPATIENT
Start: 2020-12-16 | End: 2021-01-15

## 2020-12-15 RX ORDER — OXYCODONE AND ACETAMINOPHEN 7.5; 325 MG/1; MG/1
1 TABLET ORAL EVERY 4 HOURS PRN
Qty: 150 TAB | Refills: 0 | Status: SHIPPED | OUTPATIENT
Start: 2021-01-15 | End: 2021-02-12 | Stop reason: SDUPTHER

## 2020-12-15 ASSESSMENT — PATIENT HEALTH QUESTIONNAIRE - PHQ9
CLINICAL INTERPRETATION OF PHQ2 SCORE: 6
5. POOR APPETITE OR OVEREATING: 3 - NEARLY EVERY DAY
SUM OF ALL RESPONSES TO PHQ QUESTIONS 1-9: 19

## 2020-12-15 ASSESSMENT — FIBROSIS 4 INDEX: FIB4 SCORE: 0.66

## 2020-12-15 ASSESSMENT — PAIN SCALES - GENERAL: PAINLEVEL: 8=MODERATE-SEVERE PAIN

## 2020-12-15 NOTE — PROGRESS NOTES
Follow up patient note  Pain Medicine, Interventional spine and sports physiatry, Physical medicine rehabilitation    Date of Service: 12/15/2020    Chief complaint:   Joint pain      HISTORY    Please see new patient note dated 06/08/2018 by Dr Kerr,  for more details.     [facility]  Patient identification: Mary Martinez 50 y.o. female returns for follow-up of her pain related to rheumatoid arthritis.      Interval history:   Mary has been on hold going back to see her orthopedic surgeon.  Right shoulder pain is constant and limits    Dr. Valdovinos is recommending neck surgery, but the patient continues to be hesitant about this.  She wants to evaluate in 6 months.    She continues to have limitations with her self-care related to the right shoulder, particularly.  Brushing her hair is still most difficult.    For pain, she is taking percocet 7.5/325 five a day, #150 per month and stable.  She is not feeling like this is as helpful as it was previously.  No bowel or bladder changes.  Bowel movements are regular.    Pain is an 8/10 on the NRS.    Her GI symptoms are worse again and she has been having weight loss and further work-up related to this, but so far, unclear.    For her Rheumatoid arthritis, she is still on Embrel, this has started to reduce the severity of her overall joint pain, but the right shoulder is significantly painful      PHQ-9: 19, denies suicidality   ORT: 4    ROS  Unchanged from previous visit 10/01/2020 except as noted in the HPI     Red Flags :   Fever, Chills, Sweats: Denies  Involuntary Weight Loss: Denies  Bowel/Bladder Incontinence: Denies      PMHx:   Past Medical History:   Diagnosis Date   • Arthritis     Rheumatoid -follows with rheumatologist. Has several fractures due to RA.   • ASTHMA     inhalers prn   • Bowel habit changes     4/24/20-constipation and diarrhea.   • Bronchitis last approx 2018   • Chronic pain 04/24/2020    Due to RA   • Dental disorder     no teeth  upper-does not wear her denture. Broken teeth on bottom.   • Heart burn     taking famotidine   • Hiatus hernia syndrome    • History of pancreatitis    • Indigestion     taking famotidine   • Pain     everywhere   • Pneumonia 10/2019   • Psychiatric problem     anxiety and depression   • Rheumatoid arthritis(714.0) 2003       PSHx:   Past Surgical History:   Procedure Laterality Date   • PB ENDOSCOPIC US EXAM, ESOPH  4/29/2020    Procedure: EGD, WITH ENDOSCOPIC US;  Surgeon: Jorge Brooke M.D.;  Location: [facility];  Service: Gastroenterology   • GASTROSCOPY-ENDO  4/29/2020    Procedure: GASTROSCOPY;  Surgeon: Jorge Brooke M.D.;  Location: [facility];  Service: Gastroenterology   • EGD WITH ASP/BX  4/29/2020    Procedure: EGD, WITH ASPIRATION BIOPSY - POSS FNA;  Surgeon: Jorge Brooke M.D.;  Location: [facility];  Service: Gastroenterology   • PB EXPLORATORY OF ABDOMEN N/A 10/4/2019    Procedure: LAPAROTOMY, EXPLORATORY AND REPAIR OF DUODENAL PERFORATION;  Surgeon: James Dumont M.D.;  Location: [facility];  Service: General   • COLONOSCOPY  2018    with upper endoscopy   • FINGER ARTHROPLASTY Right 6/5/2017    Procedure: FINGER ARTHROPLASTY - LONG, RING AND SMALL VOLAR PLATE;  Surgeon: Lobo Rosen M.D.;  Location: SURGERY SAME DAY [facility];  Service:    • FINGER AMPUTATION  6/5/2017    Procedure: FINGER AMPUTATION - LONG, RING AND SMALL AT THE PROXIMAL INTERPHALANGEAL JOINT;  Surgeon: Lobo Rosen M.D.;  Location: SURGERY SAME DAY [facility];  Service:    • FINGER ARTHROPLASTY Right 4/17/2017    Procedure: FINGER ARTHROPLASTY ;  Surgeon: Lobo Rosen M.D.;  Location: SURGERY SAME DAY [facility];  Service:    • FINGER AMPUTATION Right 9/14/2016    Procedure: FINGER AMPUTATION INDEX;  Surgeon: Lobo Rosen M.D.;  Location: SURGERY SAME DAY [facility];  Service:    • IRRIGATION &  DEBRIDEMENT ORTHO Right 9/11/2016    Procedure: IRRIGATION & DEBRIDEMENT ORTHO - right index finger;  Surgeon: Madhu Rosen M.D.;  Location: SURGERY [facility];  Service:    • PIP ARTHRODESIS Right 8/29/2016    Procedure: RE-DO INDEX FINGER PROXIMAL INTERPHALANGEAL ARTHRODESIS;  Surgeon: Lobo Rosen M.D.;  Location: SURGERY SAME DAY [facility];  Service:    • BONE GRAFT Right 8/29/2016    Procedure: BONE GRAFT - DISTAL RADIUS ;  Surgeon: Lobo Rosen M.D.;  Location: SURGERY SAME DAY [facility];  Service:    • ARTHRODESIS Right 5/6/2016    Procedure: ARTHRODESIS INDEX FINGER PROXIMAL INTERPHALANGEAL;  Surgeon: Lobo Rosen M.D.;  Location: SURGERY SAME DAY [facility];  Service:    • FOOT RECONSTRUCTION RHEUMATIC Left 7/29/2015    Procedure: FOOT RECONSTRUCTION RHEUMATIC;  Surgeon: Heriberto Alves M.D.;  Location: SURGERY [facility];  Service:    • TOE FUSION Right 5/27/2015    Procedure: TOE FUSION 1ST METATARSALPHALANGEAL JOINT;  Surgeon: Heriberto Alves M.D.;  Location: SURGERY [facility];  Service:    • BONE SPUR EXCISION  5/27/2015    Procedure: BONE SPUR EXCISION METATARSAL HEAD 2-3;  Surgeon: Heriberto Alves M.D.;  Location: [facility];  Service:    • HAMMERTOE CORRECTION  5/27/2015    Procedure: HAMMERTOE CORRECTION AND SOFT TISSUE RE-ALINGMENT 2-3 ;  Surgeon: Heriberto Alves M.D.;  Location: [facility];  Service:    • DENTAL EXTRACTION(S)  2014    all of upper teeth   • ABDOMINAL ABSCESS DRAINAGE  9/27/2011    Performed by VERO WRIGHT at [facility]   • EMANUEL BY LAPAROSCOPY  9/27/2011    Performed by VERO WRIGHT at [facility]   • APPENDECTOMY  2011    Found out it had ruptured prior to abcess surgery   • REPEAT C SECTION  2008   • REPEAT C SECTION  2005   • REPEAT C SECTION  1999   • PRIMARY C SECTION  1991   • TONSILLECTOMY  1982   • WRIST EXPLORATION Left 1980's    fractured wrist-no  hardware       Family history   History reviewed. No pertinent family history.      Medications:   Outpatient Medications Marked as Taking for the 12/15/20 encounter (Office Visit) with Jayy Kerr M.D.   Medication Sig Dispense Refill   • oxyCODONE-acetaminophen (PERCOCET) 7.5-325 MG per tablet Take 1 Tab by mouth every four hours as needed for up to 30 days. 150 Tab 0   • [START ON 1/15/2021] oxyCODONE-acetaminophen (PERCOCET) 7.5-325 MG per tablet Take 1 Tab by mouth every four hours as needed for up to 30 days. 150 Tab 0   • QUEtiapine (SEROQUEL) 25 MG Tab      • ENBREL 50 MG/ML Solution Prefilled Syringe INJECT 50 MG ONCE WEEKLY UNDERNEATH THE SKIN  FOR 28 DAYS     • colestipol (COLESTID) 1 GM Tab Take 1 g by mouth 2 times a day.     • Naloxone (NARCAN) 4 MG/0.1ML Liquid One spray in one nostril for overdose and call 911. 1 Package 0   • predniSONE (DELTASONE) 10 MG Tab Take 10 mg by mouth every day.     • famotidine (PEPCID) 40 MG Tab Take 40 mg by mouth every bedtime.     • SPIRIVA RESPIMAT 1.25 MCG/ACT Aero Soln Inhale 1 Puff by mouth every morning.     • ondansetron (ZOFRAN ODT) 8 MG TABLET DISPERSIBLE Take 8 mg by mouth every 8 hours as needed.     • QUEtiapine (SEROQUEL) 100 MG Tab Take 100 mg by mouth every evening.     • PARoxetine (PAXIL) 30 MG Tab Take 60 mg by mouth every evening.     • albuterol (VENTOLIN OR PROVENTIL) 108 (90 BASE) MCG/ACT AERS inhalation aerosol Inhale 2 Puffs by mouth every four hours as needed for Shortness of Breath.         Allergies:   Allergies   Allergen Reactions   • Penicillins Anaphylaxis and Hives     Tolerates cephalosporins; reports throat swelling with PCN   • Nitrous Oxide Vomiting   • Aripiprazole [Abilify] Nausea     Spasms, shaking       Social Hx:   Social History     Socioeconomic History   • Marital status:      Spouse name: Not on file   • Number of children: Not on file   • Years of education: Not on file   • Highest education level: Not on file  "  Occupational History   • Not on file   Social Needs   • Financial resource strain: Not on file   • Food insecurity     Worry: Not on file     Inability: Not on file   • Transportation needs     Medical: Not on file     Non-medical: Not on file   Tobacco Use   • Smoking status: Current Every Day Smoker     Packs/day: 1.00     Years: 30.00     Pack years: 30.00     Types: Cigarettes   • Smokeless tobacco: Never Used   • Tobacco comment: 4/24/20-now smoking approx 1/2 PPD.   Substance and Sexual Activity   • Alcohol use: Never     Frequency: Never     Binge frequency: Never   • Drug use: Never   • Sexual activity: Not on file   Lifestyle   • Physical activity     Days per week: Not on file     Minutes per session: Not on file   • Stress: Not on file   Relationships   • Social connections     Talks on phone: Not on file     Gets together: Not on file     Attends Islam service: Not on file     Active member of club or organization: Not on file     Attends meetings of clubs or organizations: Not on file     Relationship status: Not on file   • Intimate partner violence     Fear of current or ex partner: Not on file     Emotionally abused: Not on file     Physically abused: Not on file     Forced sexual activity: Not on file   Other Topics Concern   •  Service No   • Blood Transfusions Yes   • Caffeine Concern No   • Occupational Exposure No   • Hobby Hazards No   • Sleep Concern No   • Stress Concern Yes   • Weight Concern No   • Special Diet No   • Back Care No   • Exercise Yes   • Bike Helmet Yes   • Seat Belt Yes   • Self-Exams Yes   Social History Narrative    ** Merged History Encounter **              EXAMINATION     Physical Exam:   Vitals: /68 (BP Location: Right arm, Patient Position: Sitting, BP Cuff Size: Small adult)   Pulse 62   Temp 36.4 °C (97.5 °F) (Temporal)   Ht 1.6 m (5' 3\")   Wt 53.2 kg (117 lb 4.6 oz)   SpO2 98%     Constitutional:   Body Habitus: Body mass index is 20.78 " kg/m².  Cooperation: Fully cooperates with exam  Appearance: Well-groomed no disheveled, in no acute distress.  She is wearing a mask  Respiratory-  breathing comfortable on room air, no audible wheezing  Cardiovascular- no lower extremity edema is observed  Psychiatric- alert and oriented ×3. Normal affect.     Musculoskeletal:  Right shoulder with crepitus and pain with ROM less than 90 degrees before pain worsens.      Partial hand amputation on the right at the MCPs; ulnar deviation on the left with MCP subluxation, mild atrophy of the hand intrinsics with wasting of the thenar eminence.     Functional ROM of the cervical spine with pain at end range of motion.    Gait is steady without assist device.      MEDICAL DECISION MAKING    DATA    Labs: UDS 10/30/2018 consistent with medications.  Oxycodone and metabolites without other substances   UDS 04/19/2019 consistent with medications. Oxycodone and metabolites without other substances   UDS 07/19/2019 consistent with medications.  Oxycodone and metabolites without other substances   UDS 11/22/2019 consistent with medications.  Oxycodone and metabolites without other substances   UDS 02/20/2020 absent for Oxycodone and metabolites without other substances  UDS 06/10/2020 positive for oxycodone and metabolites without other substances  UDS 08/18/2020 positive for oxycodone and metabolites, positive for EtG without EtS    Imaging:    Films reviewed.  These are my reads:    Xray right shoulder 08/20/2020  There is note of severe degenerative change of the glenohumeral joint.  No fracture.  Erosive changes, consistent with known history of RA    MRI cervical spine 07/31/2018:    There is is note of motion at the atlantodental level with 5mm atlantodental inverval in flexion that reduces to 1-2 mm in extension.  Mild retrolisthesis of C4 on C5 and anterolisthesis of C5- on C6.  Disc extrusion at C6-7 with mild central canal stenosis    Xray left shoulder 2/5/2018  Humeral head is elevated.  Glenohumeral joint arthritis.    Reports reviewed:    Xray right shoulder 08/20/2020 [facility]  Impression  Extensive erosive changes of the humeral head and degenerative changes of the glenohumeral joint.  Findings are concerning for inflammatory arthropathy such as rheumatoid arthritis.      Xray cervical spine 11/14/2018  1. No acute fracture  2. Persistent motion at the C1-2 joint as before, suggesting atlantoaxial instability  3. Minimal instability noted at C5-6 level as described.    MRI cervical spine 07/31/2018  1. Widening of the atlantodental interal in neutral and flexion positions with a 5mm space which reduces to 1-2 mm in extension  2. 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 Rheumatoid arthritis involving multiple sites with positive rheumatoid factor (HCC)  M05.79   2. Depression, unspecified depression type  F32.9   3. 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 Arthritis of glenohumeral joint  M19.019   5. Chronic, continuous use of opioids  F11.90   6. Impaired mobility and ADLs  Z74.09    Z78.9         ASSESSMENT and PLAN:     Mary Mckenzielins 50 y.o. female with rheumatoid arthritis    Mary was seen today for follow-up.    Orders and management for this visit:    Orders Placed This Encounter   • Patient has been identified as having a positive depression screening. Appropriate orders and counseling have been given.   • oxyCODONE-acetaminophen (PERCOCET) 7.5-325 MG per tablet   • oxyCODONE-acetaminophen (PERCOCET) 7.5-325 MG per tablet   • Consent for Opiate Prescription   • Controlled Substance Treatment Agreement     1. Follow-up with Dr. Christopher to discuss need for further diagnostic work-up for enlarged lymph nodes.  Orthopedic  evaluation has been on hold, she thinks that she sees them in mid January 2021.  This has been delayed, will plan for her to follow-up with Dr. Christopher if this gets delayed more.  2. Continue percocet 7.5/325#150 per month.   reviewed.  Plan to continue current dose.  3. Continue Embrel with Dr. Dela Cruz  4. Continue psychology and psychiatry   5. Continue diclofenac gel for shoulders.  6. Continue with plans to see Orthopedics regarding shoulder replacement.      In prescribing controlled substances to this patient, I certify that I have obtained and reviewed the medical history of Mary Martinez. I have also made a good aristides effort to obtain applicable records from other providers who have treated the patient and records did not demonstrate any increased risk of substance abuse that would prevent me from prescribing controlled substances.     I have conducted a physical exam and documented it. I have reviewed Ms. Martinez’s prescription history as maintained by the [place] Prescription Monitoring Program.   ORT 4, indicates moderate risk    I have assessed the patient’s risk for abuse, dependency, and addiction using the validated Opioid Risk Tool available at https://www.mdcalc.com/tpkkdk-tdaz-bpir-ort-narcotic-abuse.     Given the above, I believe the benefits of controlled substance therapy outweigh the risks. The reasons for prescribing controlled substances include non-narcotic, oral analgesic alternatives have been inadequate for pain control and in my professional opinion, controlled substances are the only reasonable choice for this patient because her pain was note adequately controlled with alternatives and she has chronic progressive disease. Accordingly, I have discussed the risk and benefits, treatment plan, and alternative therapies with the patient.         Follow up: 2 months      Please note that this dictation was created using voice recognition software. I have made every reasonable  attempt to correct obvious errors but there may be errors of grammar and content that I may have overlooked prior to finalization of this note.      Jayy Kerr MD  Interventional Spine and Sports Physiatry  Physical Medicine and Rehabilitation  [facility]      CC: Dr. Fidencio Christopher

## 2021-02-12 ENCOUNTER — HOSPITAL ENCOUNTER (OUTPATIENT)
Facility: MEDICAL CENTER | Age: 51
End: 2021-02-13
Attending: EMERGENCY MEDICINE | Admitting: INTERNAL MEDICINE
Payer: MEDICAID

## 2021-02-12 ENCOUNTER — APPOINTMENT (OUTPATIENT)
Dept: RADIOLOGY | Facility: MEDICAL CENTER | Age: 51
End: 2021-02-12
Attending: EMERGENCY MEDICINE
Payer: MEDICAID

## 2021-02-12 ENCOUNTER — OFFICE VISIT (OUTPATIENT)
Dept: PHYSICAL MEDICINE AND REHAB | Facility: MEDICAL CENTER | Age: 51
End: 2021-02-12
Payer: MEDICAID

## 2021-02-12 VITALS
HEIGHT: 65 IN | SYSTOLIC BLOOD PRESSURE: 86 MMHG | DIASTOLIC BLOOD PRESSURE: 50 MMHG | BODY MASS INDEX: 20.42 KG/M2 | WEIGHT: 122.58 LBS | OXYGEN SATURATION: 97 % | TEMPERATURE: 97.6 F | RESPIRATION RATE: 16 BRPM | HEART RATE: 108 BPM

## 2021-02-12 DIAGNOSIS — E87.6 HYPOKALEMIA: ICD-10-CM

## 2021-02-12 DIAGNOSIS — G89.29 CHRONIC ABDOMINAL PAIN: ICD-10-CM

## 2021-02-12 DIAGNOSIS — M21.942 HAND DEFORMITY, ACQUIRED, LEFT: ICD-10-CM

## 2021-02-12 DIAGNOSIS — F11.90 CHRONIC, CONTINUOUS USE OF OPIOIDS: ICD-10-CM

## 2021-02-12 DIAGNOSIS — R10.9 CHRONIC ABDOMINAL PAIN: ICD-10-CM

## 2021-02-12 DIAGNOSIS — S68.411S AMPUTATION OF RIGHT HAND, SEQUELA (HCC): ICD-10-CM

## 2021-02-12 DIAGNOSIS — I95.9 HYPOTENSION, UNSPECIFIED HYPOTENSION TYPE: ICD-10-CM

## 2021-02-12 DIAGNOSIS — K92.2 GASTROINTESTINAL HEMORRHAGE, UNSPECIFIED GASTROINTESTINAL HEMORRHAGE TYPE: ICD-10-CM

## 2021-02-12 DIAGNOSIS — M19.019 ARTHRITIS OF GLENOHUMERAL JOINT: ICD-10-CM

## 2021-02-12 DIAGNOSIS — M53.2X1 ATLANTOAXIAL INSTABILITY: ICD-10-CM

## 2021-02-12 DIAGNOSIS — M05.79 RHEUMATOID ARTHRITIS INVOLVING MULTIPLE SITES WITH POSITIVE RHEUMATOID FACTOR (HCC): ICD-10-CM

## 2021-02-12 DIAGNOSIS — F32.A DEPRESSION, UNSPECIFIED DEPRESSION TYPE: ICD-10-CM

## 2021-02-12 PROBLEM — N13.39 OTHER HYDRONEPHROSIS: Status: ACTIVE | Noted: 2021-02-12

## 2021-02-12 PROBLEM — T38.0X5A IMMUNOSUPPRESSION DUE TO CHRONIC STEROID USE (HCC): Status: ACTIVE | Noted: 2021-02-12

## 2021-02-12 PROBLEM — Z87.39 HISTORY OF RHEUMATOID ARTHRITIS: Status: ACTIVE | Noted: 2021-02-12

## 2021-02-12 PROBLEM — Z79.52 IMMUNOSUPPRESSION DUE TO CHRONIC STEROID USE (HCC): Status: ACTIVE | Noted: 2021-02-12

## 2021-02-12 PROBLEM — D84.821 IMMUNOSUPPRESSION DUE TO CHRONIC STEROID USE (HCC): Status: ACTIVE | Noted: 2021-02-12

## 2021-02-12 PROBLEM — E86.1 HYPOTENSION DUE TO HYPOVOLEMIA: Status: ACTIVE | Noted: 2021-02-12

## 2021-02-12 PROBLEM — I95.89 HYPOTENSION DUE TO HYPOVOLEMIA: Status: ACTIVE | Noted: 2021-02-12

## 2021-02-12 LAB
ABO GROUP BLD: NORMAL
ALBUMIN SERPL BCP-MCNC: 3.3 G/DL (ref 3.2–4.9)
ALBUMIN/GLOB SERPL: 1 G/DL
ALP SERPL-CCNC: 120 U/L (ref 30–99)
ALT SERPL-CCNC: <5 U/L (ref 2–50)
ANION GAP SERPL CALC-SCNC: 12 MMOL/L (ref 7–16)
APPEARANCE UR: CLEAR
APTT PPP: 33.2 SEC (ref 24.7–36)
AST SERPL-CCNC: 12 U/L (ref 12–45)
BACTERIA #/AREA URNS HPF: ABNORMAL /HPF
BASOPHILS # BLD AUTO: 0.5 % (ref 0–1.8)
BASOPHILS # BLD: 0.04 K/UL (ref 0–0.12)
BILIRUB SERPL-MCNC: <0.2 MG/DL (ref 0.1–1.5)
BILIRUB UR QL STRIP.AUTO: NEGATIVE
BLD GP AB SCN SERPL QL: NORMAL
BUN SERPL-MCNC: 10 MG/DL (ref 8–22)
CALCIUM SERPL-MCNC: 8.3 MG/DL (ref 8.4–10.2)
CHLORIDE SERPL-SCNC: 111 MMOL/L (ref 96–112)
CO2 SERPL-SCNC: 16 MMOL/L (ref 20–33)
COLOR UR: YELLOW
CREAT SERPL-MCNC: 0.83 MG/DL (ref 0.5–1.4)
EKG IMPRESSION: NORMAL
EOSINOPHIL # BLD AUTO: 0.22 K/UL (ref 0–0.51)
EOSINOPHIL NFR BLD: 2.7 % (ref 0–6.9)
EPI CELLS #/AREA URNS HPF: ABNORMAL /HPF
ERYTHROCYTE [DISTWIDTH] IN BLOOD BY AUTOMATED COUNT: 52.5 FL (ref 35.9–50)
GLOBULIN SER CALC-MCNC: 3.4 G/DL (ref 1.9–3.5)
GLUCOSE SERPL-MCNC: 119 MG/DL (ref 65–99)
GLUCOSE UR STRIP.AUTO-MCNC: NEGATIVE MG/DL
HCT VFR BLD AUTO: 34.8 % (ref 37–47)
HGB BLD-MCNC: 11.1 G/DL (ref 12–16)
IMM GRANULOCYTES # BLD AUTO: 0.04 K/UL (ref 0–0.11)
IMM GRANULOCYTES NFR BLD AUTO: 0.5 % (ref 0–0.9)
INR PPP: 1.02 (ref 0.87–1.13)
KETONES UR STRIP.AUTO-MCNC: NEGATIVE MG/DL
LACTATE BLD-SCNC: 1.6 MMOL/L (ref 0.5–2)
LEUKOCYTE ESTERASE UR QL STRIP.AUTO: NEGATIVE
LIPASE SERPL-CCNC: 53 U/L (ref 7–58)
LYMPHOCYTES # BLD AUTO: 1.66 K/UL (ref 1–4.8)
LYMPHOCYTES NFR BLD: 20.6 % (ref 22–41)
MCH RBC QN AUTO: 32.8 PG (ref 27–33)
MCHC RBC AUTO-ENTMCNC: 31.9 G/DL (ref 33.6–35)
MCV RBC AUTO: 103 FL (ref 81.4–97.8)
MICRO URNS: ABNORMAL
MONOCYTES # BLD AUTO: 0.71 K/UL (ref 0–0.85)
MONOCYTES NFR BLD AUTO: 8.8 % (ref 0–13.4)
NEUTROPHILS # BLD AUTO: 5.4 K/UL (ref 2–7.15)
NEUTROPHILS NFR BLD: 66.9 % (ref 44–72)
NITRITE UR QL STRIP.AUTO: NEGATIVE
NRBC # BLD AUTO: 0 K/UL
NRBC BLD-RTO: 0 /100 WBC
PH UR STRIP.AUTO: 7 [PH] (ref 5–8)
PLATELET # BLD AUTO: 292 K/UL (ref 164–446)
PMV BLD AUTO: 10.5 FL (ref 9–12.9)
POTASSIUM SERPL-SCNC: 3.2 MMOL/L (ref 3.6–5.5)
PROT SERPL-MCNC: 6.7 G/DL (ref 6–8.2)
PROT UR QL STRIP: NEGATIVE MG/DL
PROTHROMBIN TIME: 13.1 SEC (ref 12–14.6)
RBC # BLD AUTO: 3.38 M/UL (ref 4.2–5.4)
RBC # URNS HPF: ABNORMAL /HPF
RBC UR QL AUTO: ABNORMAL
RH BLD: NORMAL
SODIUM SERPL-SCNC: 139 MMOL/L (ref 135–145)
SP GR UR STRIP.AUTO: <=1.005
TROPONIN T SERPL-MCNC: 10 NG/L (ref 6–19)
WBC # BLD AUTO: 8.1 K/UL (ref 4.8–10.8)
WBC #/AREA URNS HPF: ABNORMAL /HPF

## 2021-02-12 PROCEDURE — 87040 BLOOD CULTURE FOR BACTERIA: CPT

## 2021-02-12 PROCEDURE — G0378 HOSPITAL OBSERVATION PER HR: HCPCS

## 2021-02-12 PROCEDURE — 85610 PROTHROMBIN TIME: CPT

## 2021-02-12 PROCEDURE — 99214 OFFICE O/P EST MOD 30 MIN: CPT | Performed by: PHYSICAL MEDICINE & REHABILITATION

## 2021-02-12 PROCEDURE — 93005 ELECTROCARDIOGRAM TRACING: CPT | Performed by: EMERGENCY MEDICINE

## 2021-02-12 PROCEDURE — 71045 X-RAY EXAM CHEST 1 VIEW: CPT

## 2021-02-12 PROCEDURE — 700111 HCHG RX REV CODE 636 W/ 250 OVERRIDE (IP): Performed by: INTERNAL MEDICINE

## 2021-02-12 PROCEDURE — 700105 HCHG RX REV CODE 258: Performed by: INTERNAL MEDICINE

## 2021-02-12 PROCEDURE — 700102 HCHG RX REV CODE 250 W/ 637 OVERRIDE(OP): Performed by: EMERGENCY MEDICINE

## 2021-02-12 PROCEDURE — 700102 HCHG RX REV CODE 250 W/ 637 OVERRIDE(OP): Performed by: INTERNAL MEDICINE

## 2021-02-12 PROCEDURE — 85025 COMPLETE CBC W/AUTO DIFF WBC: CPT

## 2021-02-12 PROCEDURE — U0003 INFECTIOUS AGENT DETECTION BY NUCLEIC ACID (DNA OR RNA); SEVERE ACUTE RESPIRATORY SYNDROME CORONAVIRUS 2 (SARS-COV-2) (CORONAVIRUS DISEASE [COVID-19]), AMPLIFIED PROBE TECHNIQUE, MAKING USE OF HIGH THROUGHPUT TECHNOLOGIES AS DESCRIBED BY CMS-2020-01-R: HCPCS

## 2021-02-12 PROCEDURE — 94640 AIRWAY INHALATION TREATMENT: CPT

## 2021-02-12 PROCEDURE — 99220 PR INITIAL OBSERVATION CARE,LEVL III: CPT | Mod: 25 | Performed by: INTERNAL MEDICINE

## 2021-02-12 PROCEDURE — 86900 BLOOD TYPING SEROLOGIC ABO: CPT

## 2021-02-12 PROCEDURE — 99285 EMERGENCY DEPT VISIT HI MDM: CPT

## 2021-02-12 PROCEDURE — 84484 ASSAY OF TROPONIN QUANT: CPT

## 2021-02-12 PROCEDURE — 85730 THROMBOPLASTIN TIME PARTIAL: CPT

## 2021-02-12 PROCEDURE — 96375 TX/PRO/DX INJ NEW DRUG ADDON: CPT

## 2021-02-12 PROCEDURE — 80053 COMPREHEN METABOLIC PANEL: CPT

## 2021-02-12 PROCEDURE — 700105 HCHG RX REV CODE 258: Performed by: EMERGENCY MEDICINE

## 2021-02-12 PROCEDURE — A9270 NON-COVERED ITEM OR SERVICE: HCPCS | Performed by: EMERGENCY MEDICINE

## 2021-02-12 PROCEDURE — 83605 ASSAY OF LACTIC ACID: CPT

## 2021-02-12 PROCEDURE — 86901 BLOOD TYPING SEROLOGIC RH(D): CPT

## 2021-02-12 PROCEDURE — 96374 THER/PROPH/DIAG INJ IV PUSH: CPT

## 2021-02-12 PROCEDURE — 99406 BEHAV CHNG SMOKING 3-10 MIN: CPT | Performed by: INTERNAL MEDICINE

## 2021-02-12 PROCEDURE — 700111 HCHG RX REV CODE 636 W/ 250 OVERRIDE (IP): Performed by: EMERGENCY MEDICINE

## 2021-02-12 PROCEDURE — A9270 NON-COVERED ITEM OR SERVICE: HCPCS | Performed by: INTERNAL MEDICINE

## 2021-02-12 PROCEDURE — 74177 CT ABD & PELVIS W/CONTRAST: CPT

## 2021-02-12 PROCEDURE — 700117 HCHG RX CONTRAST REV CODE 255: Performed by: EMERGENCY MEDICINE

## 2021-02-12 PROCEDURE — 94760 N-INVAS EAR/PLS OXIMETRY 1: CPT

## 2021-02-12 PROCEDURE — 81001 URINALYSIS AUTO W/SCOPE: CPT

## 2021-02-12 PROCEDURE — U0005 INFEC AGEN DETEC AMPLI PROBE: HCPCS

## 2021-02-12 PROCEDURE — 83690 ASSAY OF LIPASE: CPT

## 2021-02-12 PROCEDURE — 86850 RBC ANTIBODY SCREEN: CPT

## 2021-02-12 RX ORDER — POLYETHYLENE GLYCOL 3350 17 G/17G
1 POWDER, FOR SOLUTION ORAL
Status: DISCONTINUED | OUTPATIENT
Start: 2021-02-12 | End: 2021-02-13 | Stop reason: HOSPADM

## 2021-02-12 RX ORDER — POTASSIUM CHLORIDE 20 MEQ/1
40 TABLET, EXTENDED RELEASE ORAL ONCE
Status: COMPLETED | OUTPATIENT
Start: 2021-02-12 | End: 2021-02-12

## 2021-02-12 RX ORDER — QUETIAPINE FUMARATE 100 MG/1
100 TABLET, FILM COATED ORAL EVERY EVENING
Status: DISCONTINUED | OUTPATIENT
Start: 2021-02-12 | End: 2021-02-13 | Stop reason: HOSPADM

## 2021-02-12 RX ORDER — OXYCODONE AND ACETAMINOPHEN 7.5; 325 MG/1; MG/1
1 TABLET ORAL EVERY 4 HOURS PRN
Status: DISCONTINUED | OUTPATIENT
Start: 2021-02-12 | End: 2021-02-13 | Stop reason: HOSPADM

## 2021-02-12 RX ORDER — PREDNISONE 10 MG/1
10 TABLET ORAL
Status: DISCONTINUED | OUTPATIENT
Start: 2021-02-13 | End: 2021-02-12

## 2021-02-12 RX ORDER — NICOTINE 21 MG/24HR
14 PATCH, TRANSDERMAL 24 HOURS TRANSDERMAL
Status: DISCONTINUED | OUTPATIENT
Start: 2021-02-12 | End: 2021-02-13 | Stop reason: HOSPADM

## 2021-02-12 RX ORDER — SODIUM CHLORIDE, SODIUM LACTATE, POTASSIUM CHLORIDE, CALCIUM CHLORIDE 600; 310; 30; 20 MG/100ML; MG/100ML; MG/100ML; MG/100ML
1000 INJECTION, SOLUTION INTRAVENOUS ONCE
Status: COMPLETED | OUTPATIENT
Start: 2021-02-12 | End: 2021-02-12

## 2021-02-12 RX ORDER — SODIUM CHLORIDE, SODIUM LACTATE, POTASSIUM CHLORIDE, AND CALCIUM CHLORIDE .6; .31; .03; .02 G/100ML; G/100ML; G/100ML; G/100ML
1000 INJECTION, SOLUTION INTRAVENOUS ONCE
Status: COMPLETED | OUTPATIENT
Start: 2021-02-12 | End: 2021-02-12

## 2021-02-12 RX ORDER — ONDANSETRON 2 MG/ML
4 INJECTION INTRAMUSCULAR; INTRAVENOUS EVERY 4 HOURS PRN
Status: DISCONTINUED | OUTPATIENT
Start: 2021-02-12 | End: 2021-02-13 | Stop reason: HOSPADM

## 2021-02-12 RX ORDER — FAMOTIDINE 20 MG/1
20 TABLET, FILM COATED ORAL 2 TIMES DAILY
Status: DISCONTINUED | OUTPATIENT
Start: 2021-02-12 | End: 2021-02-13 | Stop reason: HOSPADM

## 2021-02-12 RX ORDER — PAROXETINE HYDROCHLORIDE 20 MG/1
60 TABLET, FILM COATED ORAL EVERY EVENING
Status: DISCONTINUED | OUTPATIENT
Start: 2021-02-12 | End: 2021-02-13 | Stop reason: HOSPADM

## 2021-02-12 RX ORDER — OXYCODONE AND ACETAMINOPHEN 7.5; 325 MG/1; MG/1
1 TABLET ORAL EVERY 4 HOURS PRN
Qty: 150 TABLET | Refills: 0 | Status: SHIPPED | OUTPATIENT
Start: 2021-03-16 | End: 2021-02-12

## 2021-02-12 RX ORDER — OXYCODONE AND ACETAMINOPHEN 7.5; 325 MG/1; MG/1
1 TABLET ORAL EVERY 4 HOURS PRN
Qty: 150 TABLET | Refills: 0 | Status: SHIPPED | OUTPATIENT
Start: 2021-02-14 | End: 2021-03-16

## 2021-02-12 RX ORDER — AMOXICILLIN 250 MG
2 CAPSULE ORAL 2 TIMES DAILY
Status: DISCONTINUED | OUTPATIENT
Start: 2021-02-12 | End: 2021-02-13 | Stop reason: HOSPADM

## 2021-02-12 RX ORDER — MONTELUKAST SODIUM 4 MG/1
1 TABLET, CHEWABLE ORAL 2 TIMES DAILY
Status: DISCONTINUED | OUTPATIENT
Start: 2021-02-12 | End: 2021-02-13 | Stop reason: HOSPADM

## 2021-02-12 RX ORDER — ALBUTEROL SULFATE 90 UG/1
2 AEROSOL, METERED RESPIRATORY (INHALATION) EVERY 4 HOURS PRN
Status: DISCONTINUED | OUTPATIENT
Start: 2021-02-12 | End: 2021-02-13 | Stop reason: HOSPADM

## 2021-02-12 RX ORDER — SODIUM CHLORIDE, SODIUM LACTATE, POTASSIUM CHLORIDE, CALCIUM CHLORIDE 600; 310; 30; 20 MG/100ML; MG/100ML; MG/100ML; MG/100ML
INJECTION, SOLUTION INTRAVENOUS CONTINUOUS
Status: DISCONTINUED | OUTPATIENT
Start: 2021-02-12 | End: 2021-02-13 | Stop reason: HOSPADM

## 2021-02-12 RX ORDER — POTASSIUM CHLORIDE 7.45 MG/ML
10 INJECTION INTRAVENOUS ONCE
Status: DISCONTINUED | OUTPATIENT
Start: 2021-02-12 | End: 2021-02-13 | Stop reason: HOSPADM

## 2021-02-12 RX ORDER — SODIUM CHLORIDE, SODIUM LACTATE, POTASSIUM CHLORIDE, AND CALCIUM CHLORIDE .6; .31; .03; .02 G/100ML; G/100ML; G/100ML; G/100ML
1000 INJECTION, SOLUTION INTRAVENOUS
Status: DISCONTINUED | OUTPATIENT
Start: 2021-02-12 | End: 2021-02-13 | Stop reason: HOSPADM

## 2021-02-12 RX ORDER — PROMETHAZINE HYDROCHLORIDE 25 MG/1
12.5-25 TABLET ORAL EVERY 4 HOURS PRN
Status: DISCONTINUED | OUTPATIENT
Start: 2021-02-12 | End: 2021-02-13 | Stop reason: HOSPADM

## 2021-02-12 RX ORDER — BISACODYL 10 MG
10 SUPPOSITORY, RECTAL RECTAL
Status: DISCONTINUED | OUTPATIENT
Start: 2021-02-12 | End: 2021-02-13 | Stop reason: HOSPADM

## 2021-02-12 RX ORDER — SODIUM CHLORIDE, SODIUM LACTATE, POTASSIUM CHLORIDE, CALCIUM CHLORIDE 600; 310; 30; 20 MG/100ML; MG/100ML; MG/100ML; MG/100ML
INJECTION, SOLUTION INTRAVENOUS CONTINUOUS
Status: DISCONTINUED | OUTPATIENT
Start: 2021-02-12 | End: 2021-02-12

## 2021-02-12 RX ORDER — PROMETHAZINE HYDROCHLORIDE 25 MG/1
12.5-25 SUPPOSITORY RECTAL EVERY 4 HOURS PRN
Status: DISCONTINUED | OUTPATIENT
Start: 2021-02-12 | End: 2021-02-13 | Stop reason: HOSPADM

## 2021-02-12 RX ORDER — OXYCODONE AND ACETAMINOPHEN 7.5; 325 MG/1; MG/1
1 TABLET ORAL EVERY 4 HOURS PRN
Status: DISCONTINUED | OUTPATIENT
Start: 2021-02-12 | End: 2021-02-12

## 2021-02-12 RX ORDER — FAMOTIDINE 20 MG/1
40 TABLET, FILM COATED ORAL
Status: DISCONTINUED | OUTPATIENT
Start: 2021-02-12 | End: 2021-02-12

## 2021-02-12 RX ORDER — ONDANSETRON 4 MG/1
4 TABLET, ORALLY DISINTEGRATING ORAL EVERY 4 HOURS PRN
Status: DISCONTINUED | OUTPATIENT
Start: 2021-02-12 | End: 2021-02-13 | Stop reason: HOSPADM

## 2021-02-12 RX ORDER — PROCHLORPERAZINE EDISYLATE 5 MG/ML
5-10 INJECTION INTRAMUSCULAR; INTRAVENOUS EVERY 4 HOURS PRN
Status: DISCONTINUED | OUTPATIENT
Start: 2021-02-12 | End: 2021-02-13 | Stop reason: HOSPADM

## 2021-02-12 RX ADMIN — SODIUM CHLORIDE, POTASSIUM CHLORIDE, SODIUM LACTATE AND CALCIUM CHLORIDE 1000 ML: 600; 310; 30; 20 INJECTION, SOLUTION INTRAVENOUS at 13:37

## 2021-02-12 RX ADMIN — SODIUM CHLORIDE, POTASSIUM CHLORIDE, SODIUM LACTATE AND CALCIUM CHLORIDE: 600; 310; 30; 20 INJECTION, SOLUTION INTRAVENOUS at 18:05

## 2021-02-12 RX ADMIN — PAROXETINE HYDROCHLORIDE 60 MG: 20 TABLET, FILM COATED ORAL at 17:13

## 2021-02-12 RX ADMIN — FAMOTIDINE 20 MG: 20 TABLET ORAL at 17:14

## 2021-02-12 RX ADMIN — FENTANYL CITRATE 50 MCG: 50 INJECTION, SOLUTION INTRAMUSCULAR; INTRAVENOUS at 14:45

## 2021-02-12 RX ADMIN — IOHEXOL 100 ML: 350 INJECTION, SOLUTION INTRAVENOUS at 13:13

## 2021-02-12 RX ADMIN — OXYCODONE HYDROCHLORIDE AND ACETAMINOPHEN 1 TABLET: 7.5; 325 TABLET ORAL at 20:17

## 2021-02-12 RX ADMIN — SODIUM CHLORIDE, POTASSIUM CHLORIDE, SODIUM LACTATE AND CALCIUM CHLORIDE 1000 ML: 600; 310; 30; 20 INJECTION, SOLUTION INTRAVENOUS at 12:48

## 2021-02-12 RX ADMIN — GLYCOPYRROLATE 1 CAPSULE: 15.6 CAPSULE RESPIRATORY (INHALATION) at 17:33

## 2021-02-12 RX ADMIN — QUETIAPINE FUMARATE 100 MG: 100 TABLET ORAL at 17:13

## 2021-02-12 RX ADMIN — ONDANSETRON 4 MG: 4 TABLET, ORALLY DISINTEGRATING ORAL at 20:17

## 2021-02-12 RX ADMIN — POTASSIUM CHLORIDE 40 MEQ: 1500 TABLET, EXTENDED RELEASE ORAL at 14:38

## 2021-02-12 RX ADMIN — NICOTINE 14 MG: 14 PATCH TRANSDERMAL at 17:10

## 2021-02-12 ASSESSMENT — COGNITIVE AND FUNCTIONAL STATUS - GENERAL
DRESSING REGULAR UPPER BODY CLOTHING: A LITTLE
PERSONAL GROOMING: A LITTLE
DAILY ACTIVITIY SCORE: 21
SUGGESTED CMS G CODE MODIFIER MOBILITY: CJ
TURNING FROM BACK TO SIDE WHILE IN FLAT BAD: A LITTLE
SUGGESTED CMS G CODE MODIFIER DAILY ACTIVITY: CJ
HELP NEEDED FOR BATHING: A LITTLE
MOBILITY SCORE: 22
MOVING TO AND FROM BED TO CHAIR: A LITTLE

## 2021-02-12 ASSESSMENT — LIFESTYLE VARIABLES
ON A TYPICAL DAY WHEN YOU DRINK ALCOHOL HOW MANY DRINKS DO YOU HAVE: 0
EVER HAD A DRINK FIRST THING IN THE MORNING TO STEADY YOUR NERVES TO GET RID OF A HANGOVER: NO
ALCOHOL_USE: NO
TOTAL SCORE: 0
HAVE PEOPLE ANNOYED YOU BY CRITICIZING YOUR DRINKING: NO
AVERAGE NUMBER OF DAYS PER WEEK YOU HAVE A DRINK CONTAINING ALCOHOL: 0
TOTAL SCORE: 0
HAVE YOU EVER FELT YOU SHOULD CUT DOWN ON YOUR DRINKING: NO
CONSUMPTION TOTAL: NEGATIVE
EVER FELT BAD OR GUILTY ABOUT YOUR DRINKING: NO
TOTAL SCORE: 0
HOW MANY TIMES IN THE PAST YEAR HAVE YOU HAD 5 OR MORE DRINKS IN A DAY: 0

## 2021-02-12 ASSESSMENT — FIBROSIS 4 INDEX
FIB4 SCORE: 0.66
FIB4 SCORE: 0.66

## 2021-02-12 ASSESSMENT — PATIENT HEALTH QUESTIONNAIRE - PHQ9
2. FEELING DOWN, DEPRESSED, IRRITABLE, OR HOPELESS: MORE THAN HALF THE DAYS
5. POOR APPETITE OR OVEREATING: NOT AT ALL
SUM OF ALL RESPONSES TO PHQ QUESTIONS 1-9: 15
9. THOUGHTS THAT YOU WOULD BE BETTER OFF DEAD, OR OF HURTING YOURSELF: NOT AT ALL
8. MOVING OR SPEAKING SO SLOWLY THAT OTHER PEOPLE COULD HAVE NOTICED. OR THE OPPOSITE, BEING SO FIGETY OR RESTLESS THAT YOU HAVE BEEN MOVING AROUND A LOT MORE THAN USUAL: MORE THAN HALF THE DAYS
SUM OF ALL RESPONSES TO PHQ9 QUESTIONS 1 AND 2: 3
4. FEELING TIRED OR HAVING LITTLE ENERGY: MORE THAN HALF THE DAYS
CLINICAL INTERPRETATION OF PHQ2 SCORE: 3
6. FEELING BAD ABOUT YOURSELF - OR THAT YOU ARE A FAILURE OR HAVE LET YOURSELF OR YOUR FAMILY DOWN: MORE THAN HALF THE DAYS
7. TROUBLE CONCENTRATING ON THINGS, SUCH AS READING THE NEWSPAPER OR WATCHING TELEVISION: NEARLY EVERY DAY
1. LITTLE INTEREST OR PLEASURE IN DOING THINGS: SEVERAL DAYS
5. POOR APPETITE OR OVEREATING: 3 - NEARLY EVERY DAY
SUM OF ALL RESPONSES TO PHQ QUESTIONS 1-9: 16
3. TROUBLE FALLING OR STAYING ASLEEP OR SLEEPING TOO MUCH: NEARLY EVERY DAY

## 2021-02-12 NOTE — ASSESSMENT & PLAN NOTE
Seems to be mild and chronic, no ureteral on CT  Ordered urinalysis  Normal creatinine  If not having pain, no obstructive uropathy or no HASMUKH tomorrow, can simply curbside and follow up with Urology

## 2021-02-12 NOTE — ASSESSMENT & PLAN NOTE
Trend Hb.  Anemia is mild  If she has active bleeding then can consider calling GI Dr. Law's group.

## 2021-02-12 NOTE — ED NOTES
Pt is getting ready to go to imaging. She is perky alert and verbalize that her BP is usually low.

## 2021-02-12 NOTE — ED PROVIDER NOTES
"ED Provider Note      ER PROVIDER NOTE      CHIEF COMPLAINT  Chief Complaint   Patient presents with   • Bloody Stools     Report bright red bloody stools x1 month. Reports \"always having diarrhea\".    • Abdominal Pain     12.5 phenergan, fent 100 given en route for epigastric abd pain. Denies vomiting.        HPI  Mary Martinez is a 50 y.o. female who presents to the emergency department complaining of generalized weakness and upper abdominal pain.  Patient was visiting her primary doctor, Dr. Kerr this morning and noted to be hypotensive and sent here.  Patient reports she has felt some generalized weakness over the last several days, somewhat lightheaded.  She reports she has chronic upper abdominal pain may be somewhat worse in the last few days as well.  She is also had some nausea and vomiting which also is chronic for her although worse in the last few days, no blood in her emesis.  Also reports chronic diarrhea, has been having some bright red blood in her diarrhea over the last month, 3-4 episodes.  She notes no fevers or chills, no chest pain, shortness of breath or cough.  No urinary symptoms    Does not take any blood thinners    REVIEW OF SYSTEMS  Pertinent positives include abdominal pain, hypotension, weakness. Pertinent negatives include no fever. See HPI for details. All other systems reviewed and are negative.    PAST MEDICAL HISTORY   has a past medical history of Arthritis, ASTHMA, Bowel habit changes, Bronchitis (last approx 2018), Chronic pain (04/24/2020), Dental disorder, Heart burn, Hiatus hernia syndrome, History of pancreatitis, Indigestion, Pain, Pneumonia (10/2019), Psychiatric problem, and Rheumatoid arthritis(714.0) (2003).    SURGICAL HISTORY   has a past surgical history that includes abdominal abscess drainage (9/27/2011); toe fusion (Right, 5/27/2015); bone spur excision (5/27/2015); hammertoe correction (5/27/2015); foot reconstruction rheumatic (Left, 7/29/2015); " arthrodesis (Right, 5/6/2016); pip arthrodesis (Right, 8/29/2016); bone graft (Right, 8/29/2016); irrigation & debridement ortho (Right, 9/11/2016); finger amputation (Right, 9/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    FAMILY HISTORY  No family history on file.    SOCIAL HISTORY  Social History     Socioeconomic History   • Marital status:      Spouse name: Not on file   • Number of children: Not on file   • Years of education: Not on file   • Highest education level: Not on file   Occupational History   • Not on file   Tobacco Use   • Smoking status: Current Every Day Smoker     Packs/day: 1.00     Years: 30.00     Pack years: 30.00     Types: Cigarettes   • Smokeless tobacco: Never Used   • Tobacco comment: 4/24/20-now smoking approx 1/2 PPD.   Substance and Sexual Activity   • Alcohol use: Never   • Drug use: Never   • Sexual activity: Not on file   Other Topics Concern   •  Service No   • Blood Transfusions Yes   • Caffeine Concern No   • Occupational Exposure No   • Hobby Hazards No   • Sleep Concern No   • Stress Concern Yes   • Weight Concern No   • Special Diet No   • Back Care No   • Exercise Yes   • Bike Helmet Yes   • Seat Belt Yes   • Self-Exams Yes   Social History Narrative    ** Merged History Encounter **          Social Determinants of Health     Financial Resource Strain:    • Difficulty of Paying Living Expenses:    Food Insecurity:    • Worried About Running Out of Food in the Last Year:    • Ran Out of  Food in the Last Year:    Transportation Needs:    • Lack of Transportation (Medical):    • Lack of Transportation (Non-Medical):    Physical Activity:    • Days of Exercise per Week:    • Minutes of Exercise per Session:    Stress:    • Feeling of Stress :    Social Connections:    • Frequency of Communication with Friends and Family:    • Frequency of Social Gatherings with Friends and Family:    • Attends Oriental orthodox Services:    • Active Member of Clubs or Organizations:    • Attends Club or Organization Meetings:    • Marital Status:    Intimate Partner Violence:    • Fear of Current or Ex-Partner:    • Emotionally Abused:    • Physically Abused:    • Sexually Abused:       Social History     Substance and Sexual Activity   Drug Use Never       CURRENT MEDICATIONS  Home Medications     Reviewed by Arnulfo Pablo (Pharmacy Tech) on 02/12/21 at 1242  Med List Status: Complete   Medication Last Dose Status   albuterol (VENTOLIN OR PROVENTIL) 108 (90 BASE) MCG/ACT AERS inhalation aerosol LAST WEEK Active   colestipol (COLESTID) 1 GM Tab 2/12/2021 Active   ENBREL 50 MG/ML Solution Prefilled Syringe 2/9/2021 Active   famotidine (PEPCID) 40 MG Tab 2/11/2021 Active   Naloxone (NARCAN) 4 MG/0.1ML Liquid PRN Active   ondansetron (ZOFRAN ODT) 8 MG TABLET DISPERSIBLE 2/11/2021 Active   oxyCODONE-acetaminophen (PERCOCET) 7.5-325 MG per tablet 2/12/2021 Active   oxyCODONE-acetaminophen (PERCOCET) 7.5-325 MG per tablet Not Taking Active   PARoxetine (PAXIL) 30 MG Tab 2/12/2021 Active   predniSONE (DELTASONE) 10 MG Tab 2/12/2021 Active   QUEtiapine (SEROQUEL) 100 MG Tab 2/11/2021 Active   SPIRIVA RESPIMAT 1.25 MCG/ACT Aero Soln 2/12/2021 Active                ALLERGIES  Allergies   Allergen Reactions   • Penicillins Anaphylaxis and Hives     Tolerates cephalosporins; reports throat swelling with PCN   • Aripiprazole Nausea     Spasms, shaking     • Nitrous Oxide Vomiting       PHYSICAL EXAM  VITAL SIGNS: BP (!) 90/54    "Pulse 88   Temp 36.7 °C (98 °F) (Temporal)   Resp 13   Ht 1.6 m (5' 3\")   Wt 55.3 kg (122 lb)   LMP 09/21/2011   SpO2 99%   BMI 21.61 kg/m²   Pulse ox interpretation: I interpret this pulse ox as normal.    Constitutional: Alert mild distress.  HENT: No signs of trauma, Bilateral external ears normal, Nose normal.   Eyes: Pupils are equal and reactive, Conjunctiva normal, Non-icteric.   Neck: Normal range of motion, No tenderness, Supple, No stridor.   Lymphatic: No lymphadenopathy noted.   Cardiovascular: Tachycardic, regular rhythm, no murmurs.   Thorax & Lungs: Normal breath sounds, No respiratory distress, No wheezing, No chest tenderness.   Abdomen: Bowel sounds normal, Soft, epigastric tenderness, No masses, No pulsatile masses. No peritoneal signs.  Skin: Warm, Dry, No erythema, No rash.   Back: No bony tenderness, No CVA tenderness.   Extremities: Intact distal pulses, No edema, No tenderness, No cyanosis, Negative Jim's sign.  Musculoskeletal: partial Amputation over the right hand, good range of motion in all major joints. No tenderness to palpation or major deformities noted.   Neurologic: Alert , Normal motor function, Normal sensory function, No focal deficits noted.   Psychiatric: Affect normal, Judgment normal, Mood normal.     DIAGNOSTIC STUDIES / PROCEDURES    Results for orders placed or performed during the hospital encounter of 02/12/21   CBC WITH DIFFERENTIAL   Result Value Ref Range    WBC 8.1 4.8 - 10.8 K/uL    RBC 3.38 (L) 4.20 - 5.40 M/uL    Hemoglobin 11.1 (L) 12.0 - 16.0 g/dL    Hematocrit 34.8 (L) 37.0 - 47.0 %    .0 (H) 81.4 - 97.8 fL    MCH 32.8 27.0 - 33.0 pg    MCHC 31.9 (L) 33.6 - 35.0 g/dL    RDW 52.5 (H) 35.9 - 50.0 fL    Platelet Count 292 164 - 446 K/uL    MPV 10.5 9.0 - 12.9 fL    Neutrophils-Polys 66.90 44.00 - 72.00 %    Lymphocytes 20.60 (L) 22.00 - 41.00 %    Monocytes 8.80 0.00 - 13.40 %    Eosinophils 2.70 0.00 - 6.90 %    Basophils 0.50 0.00 - 1.80 %    " Immature Granulocytes 0.50 0.00 - 0.90 %    Nucleated RBC 0.00 /100 WBC    Neutrophils (Absolute) 5.40 2.00 - 7.15 K/uL    Lymphs (Absolute) 1.66 1.00 - 4.80 K/uL    Monos (Absolute) 0.71 0.00 - 0.85 K/uL    Eos (Absolute) 0.22 0.00 - 0.51 K/uL    Baso (Absolute) 0.04 0.00 - 0.12 K/uL    Immature Granulocytes (abs) 0.04 0.00 - 0.11 K/uL    NRBC (Absolute) 0.00 K/uL   COMP METABOLIC PANEL   Result Value Ref Range    Sodium 139 135 - 145 mmol/L    Potassium 3.2 (L) 3.6 - 5.5 mmol/L    Chloride 111 96 - 112 mmol/L    Co2 16 (L) 20 - 33 mmol/L    Anion Gap 12.0 7.0 - 16.0    Glucose 119 (H) 65 - 99 mg/dL    Bun 10 8 - 22 mg/dL    Creatinine 0.83 0.50 - 1.40 mg/dL    Calcium 8.3 (L) 8.4 - 10.2 mg/dL    AST(SGOT) 12 12 - 45 U/L    ALT(SGPT) <5 2 - 50 U/L    Alkaline Phosphatase 120 (H) 30 - 99 U/L    Total Bilirubin <0.2 0.1 - 1.5 mg/dL    Albumin 3.3 3.2 - 4.9 g/dL    Total Protein 6.7 6.0 - 8.2 g/dL    Globulin 3.4 1.9 - 3.5 g/dL    A-G Ratio 1.0 g/dL   LIPASE   Result Value Ref Range    Lipase 53 7 - 58 U/L   COD (ADULT)   Result Value Ref Range    ABO Grouping Only O     Rh Grouping Only NEG     Antibody Screen-Cod NEG    APTT   Result Value Ref Range    APTT 33.2 24.7 - 36.0 sec   PROTHROMBIN TIME (INR)   Result Value Ref Range    PT 13.1 12.0 - 14.6 sec    INR 1.02 0.87 - 1.13   TROPONIN   Result Value Ref Range    Troponin T 10 6 - 19 ng/L   URINALYSIS    Specimen: Urine   Result Value Ref Range    Micro Urine Req Microscopic    LACTIC ACID   Result Value Ref Range    Lactic Acid 1.6 0.5 - 2.0 mmol/L   ESTIMATED GFR   Result Value Ref Range    GFR If African American >60 >60 mL/min/1.73 m 2    GFR If Non African American >60 >60 mL/min/1.73 m 2   EKG   Result Value Ref Range    Report       [facility] Emergency Dept[facility]    Test Date:  2021-02-12  Pt Name:    STEFFANIEYAYA ELLISON                Department: [facility]  MRN:        [id number]                      [facility]  Gender:     Female                        Technician: HRR  :        [id number]                   Requested By:ISSA ESCOBAR  Order #:    [id number]                    Reading MD: ISSA ESCOBAR MD    Measurements  Intervals                                Axis  Rate:       89                           P:          75  NV:         204                          QRS:        76  QRSD:       110                          T:          51  QT:         389  QTc:        474    Interpretive Statements  Sinus rhythm  Borderline prolonged NV interval  Probable left atrial enlargement  Low voltage, precordial leads  Compared to ECG 10/10/2019 23:30:49  No significant changes  Electronically Signed On 2021 12:45:32 PST by ISSA ESCOBAR MD           RADIOLOGY  CT-ABDOMEN-PELVIS WITH   Final Result      1.  No acute inflammatory process in the abdomen or pelvis.   2.  Bilateral nephrolithiasis and mild bilateral hydronephrosis. No ureteral stones.   3.  Moderate amount of stool throughout the colon.      DX-CHEST-PORTABLE (1 VIEW)   Final Result      No evidence of acute cardiopulmonary process.        The radiologist's interpretation of all radiological studies have been reviewed and images independently viewed by me.    COURSE & MEDICAL DECISION MAKING  Nursing notes, VS, PMSFHx reviewed in chart.    12:23 PM Patient seen and examined at bedside. Patient will be treated with IV fluids, fentanyl, Zofran. Ordered for abscess bundle, CAD, coags, CT abdomen pelvis, lipase, troponin, ECG to evaluate her symptoms.       I reviewed the patient records, she was admitted in 2019 for presumed duodenal perforation with abscess    1:28 PM  Patient is reevaluated, pain is improved.  Supplemental potassium has been ordered    2:16 PM  Patient is reevaluated, systolic 90, updated on all results plan for hospitalization    Discussed case Dr Najera for hospitalization    The total critical care time spent on this patient was 40 minutes, resuscitating patient,  speaking with admitting physician, and interpreting test results. This 40 minutes is exclusive of separately billable procedures.        HYDRATION: Based on the patient's presentation of Hypotension the patient was given IV fluids. IV Hydration was used because oral hydration was not as rapid as required. Upon recheck following hydration, the patient was improved.    Decision Making:  This is a 50 y.o. female with generalized weakness and vomiting, after she was sent in from her outpatient physician with hypotension as well.  I think this is most likely volume depletion, she has been started on IV fluids, is also hypokalemic and has been started on repletion for this.  While she has had some intermittent blood in her stool this has been over a month only a few episodes and her hemoglobin is actually higher than in the past so I do not suspect significant GI bleed.  No fevers or other infectious symptoms to suggest sepsis as etiology for her hypotension.  I did obtain a CT scan of her abdomen and pelvis with her prior abdominal history that was unremarkable for acute pathology    Patient admitted in guarded condition    FINAL IMPRESSION  1. Hypotension, unspecified hypotension type    2. Hypokalemia    3. Chronic abdominal pain          The note accurately reflects work and decisions made by me.  Matthew Snow M.D.  2/12/2021  2:39 PM

## 2021-02-12 NOTE — H&P
"Hospital Medicine History & Physical Note    Date of Service  2021    Primary Care Physician  Fidencio Christopher D.O.    Consultants    Code Status  Prior    Chief Complaint  Chief Complaint   Patient presents with   • Bloody Stools     Report bright red bloody stools x1 month. Reports \"always having diarrhea\".    • Abdominal Pain     12.5 phenergan, fent 100 given en route for epigastric abd pain. Denies vomiting.        History of Presenting Illness  50 y.o. female who presented 2021 with Bloody Stools (Report bright red bloody stools x1 month. Reports \"always having diarrhea\". ) and Abdominal Pain (12.5 phenergan, fent 100 given en route for epigastric abd pain. Denies vomiting. )  Chronic pain syndrome, history of back pain, shoulder pain, finger arthroplasty from RA, followed by Orthopedics and Dr. Kerr, Pain Clinic for shoulder pain, on narcotics. History of rheumatoid arthritis followed by Dr. Dela Cruz, on Enbrel  History of perforation of posterior duodenum pancreatitis abscess s/p repair by Dr. Dumont   History of dilated common bile duct and pancreatic duct s/p EGD and E U/S by Dr. Law on 2020 showin.  Esophagus, unremarkable proximal mucosa.  2.  Esophagitis at the GE junction, [place] grade A.  Biopsied.  3.  Stomach, 2 cm hiatal hernia.  4.  Gastric antrum inflammation with erythema and mosaic mucosal pattern,   biopsied.  5.  Duodenum, unremarkable mucosa, biopsied to rule out celiac sprue.  6.  Biliary ampulla unremarkable.  1.  Celiac axis, no adenopathy.  2.  Pancreatic body and tail, normal parenchyma throughout.  3.  Pancreatic duct, normal course.  Generous caliber throughout.  Measuring   1.5 mm in the tail.  4.  Head of pancreas, normal dorsal ventral transition.  Unremarkable   parenchyma.  5.  Biliary ampulla unremarkable visually and endosonographically.  6.  Common bile duct, normal course.  Generous caliber in the mid duct.  No   filling defects " "appreciated.  At the time, NO further workup for pancreatic/bile duct dilation recommended.  Presents with Bloody Stools (Report bright red bloody stools x1 month. Reports \"always having diarrhea\". ) and Abdominal Pain (12.5 phenergan, fent 100 given en route for epigastric abd pain. Denies vomiting. )  Few days of nonbloody vomiting, unable to keep food down. In the last month she complained of BRBPR, last episode was last week. She also complained of heartburn, night sweats, mild shortness of breath.  Chronic N/V last few days nothing bloody or melenous  Was seen by Dr. Kerr, primary care setting today. At clinic she was found to be HYPOTENSIVE so she was sent to the ED.   At the ED, afebrile, HYPOTENSION. Given IV fluid boluses at ED and SBP improving to low normal.  CT:  1.  No acute inflammatory process in the abdomen or pelvis.  2.  Bilateral nephrolithiasis and mild bilateral hydronephrosis. No ureteral stones.  3.  Moderate amount of stool throughout the colon.  When I saw her at the ED, she is in no acute distress. Rheumatoid changes on her fingers and feet.  I calrirified the reason she came in here. She mentioned her BRBPR, N/V are all chronic (she has hemorrhoids) and hasn't gotten any worse. She also has chronic dizziness and low blood pressure as well. She came in primarily to Dr. Kerr, her Pain Clinic doctor for med adjustments but it was the hypotension that prompted Dr. Kerr to bring her in. She did admit the blood pressures she got at clinic were lower than her usual readings but she or her  couldn't clarify exactly what they were.    Review of Systems  ROS    Past Medical History   has a past medical history of Arthritis, ASTHMA, Bowel habit changes, Bronchitis (last approx 2018), Chronic pain (04/24/2020), Dental disorder, Heart burn, Hiatus hernia syndrome, History of pancreatitis, Indigestion, Pain, Pneumonia (10/2019), Psychiatric problem, and Rheumatoid arthritis(714.0) " (2003).    Surgical History   has a past surgical history that includes abdominal abscess drainage (9/27/2011); toe fusion (Right, 5/27/2015); bone spur excision (5/27/2015); hammertoe correction (5/27/2015); foot reconstruction rheumatic (Left, 7/29/2015); arthrodesis (Right, 5/6/2016); pip arthrodesis (Right, 8/29/2016); bone graft (Right, 8/29/2016); irrigation & debridement ortho (Right, 9/11/2016); finger amputation (Right, 9/14/2016); finger arthroplasty (Right, 4/17/2017); finger arthroplasty (Right, 6/5/2017); finger amputation (6/5/2017); pr exploratory of abdomen (N/A, 10/4/2019); tonsillectomy (1982); primary c section (1991); repeat c section (1999); repeat c section (2005); repeat c section (2008); appendectomy (2011); nicholas by laparoscopy (9/27/2011); wrist exploration (Left, 1980's); colonoscopy (2018); dental extraction(s) (2014); pr endoscopic us exam, esoph (4/29/2020); gastroscopy-endo (4/29/2020); and egd with asp/bx (4/29/2020).     Family History  family history is not on file.   No family history of rheumatoid arthritis.  Social History   reports that she has been smoking cigarettes. She has a 30.00 pack-year smoking history. She has never used smokeless tobacco. She reports that she does not drink alcohol and does not use drugs.    Allergies  Allergies   Allergen Reactions   • Penicillins Anaphylaxis and Hives     Tolerates cephalosporins; reports throat swelling with PCN   • Aripiprazole Nausea     Spasms, shaking     • Nitrous Oxide Vomiting       Medications  Prior to Admission Medications   Prescriptions Last Dose Informant Patient Reported? Taking?   ENBREL 50 MG/ML Solution Prefilled Syringe 2/9/2021 at AM Patient Yes No   Sig: INJECT 50 MG ONCE WEEKLY UNDERNEATH THE SKIN  FOR 28 DAYS   Naloxone (NARCAN) 4 MG/0.1ML Liquid PRN at PRN Patient No No   Sig: One spray in one nostril for overdose and call 911.   PARoxetine (PAXIL) 30 MG Tab 2/12/2021 at 0800 Patient Yes No   Sig: Take 60 mg  by mouth every evening.   QUEtiapine (SEROQUEL) 100 MG Tab 2/11/2021 at PM Patient Yes No   Sig: Take 100 mg by mouth every evening.   SPIRIVA RESPIMAT 1.25 MCG/ACT Aero Soln 2/12/2021 at 0800 Patient Yes No   Sig: Inhale 1 Puff by mouth every morning.   albuterol (VENTOLIN OR PROVENTIL) 108 (90 BASE) MCG/ACT AERS inhalation aerosol LAST WEEK at PRN Patient Yes No   Sig: Inhale 2 Puffs by mouth every four hours as needed for Shortness of Breath.   colestipol (COLESTID) 1 GM Tab 2/12/2021 at 0800 Patient Yes No   Sig: Take 1 g by mouth 2 times a day.   famotidine (PEPCID) 40 MG Tab 2/11/2021 at HS Patient Yes No   Sig: Take 40 mg by mouth every bedtime.   ondansetron (ZOFRAN ODT) 8 MG TABLET DISPERSIBLE 2/11/2021 at PM Patient Yes No   Sig: Take 8 mg by mouth every 8 hours as needed.   oxyCODONE-acetaminophen (PERCOCET) 7.5-325 MG per tablet 2/12/2021 at 0800 Patient No No   Sig: Take 1 tablet by mouth every four hours as needed for up to 30 days.   oxyCODONE-acetaminophen (PERCOCET) 7.5-325 MG per tablet Not Taking at Unknown time Patient No No   Sig: Take 1 tablet by mouth every four hours as needed for up to 30 days.   Patient not taking: Reported on 2/12/2021   predniSONE (DELTASONE) 10 MG Tab 2/12/2021 at 0800 Patient Yes No   Sig: Take 10 mg by mouth every day.      Facility-Administered Medications: None       Physical Exam  Temp:  [36.7 °C (98 °F)] 36.7 °C (98 °F)  Pulse:  [86-96] 88  Resp:  [13-20] 13  BP: (80-95)/(54-61) 90/54  SpO2:  [97 %-99 %] 99 %    Physical Exam  Vitals and nursing note reviewed.   Constitutional:       General: She is not in acute distress.     Comments: Thin   HENT:      Head: Normocephalic and atraumatic.      Right Ear: External ear normal.      Left Ear: External ear normal.      Nose: Nose normal.      Mouth/Throat:      Mouth: Mucous membranes are moist.   Eyes:      General: No scleral icterus.     Conjunctiva/sclera: Conjunctivae normal.   Cardiovascular:      Rate and  Rhythm: Normal rate and regular rhythm.      Pulses: Normal pulses.      Heart sounds: No murmur. No friction rub. No gallop.    Pulmonary:      Effort: Pulmonary effort is normal. No respiratory distress.      Breath sounds: Normal breath sounds. No stridor. No wheezing, rhonchi or rales.   Chest:      Chest wall: No tenderness.   Abdominal:      General: Abdomen is flat. Bowel sounds are normal. There is no distension.      Palpations: Abdomen is soft.      Tenderness: There is no abdominal tenderness. There is no guarding or rebound.   Musculoskeletal:         General: Deformity (Rhematoid nodules and deformities of hands and fingers) present. No swelling or tenderness. Normal range of motion.      Cervical back: Normal range of motion and neck supple. No rigidity.   Skin:     General: Skin is warm.      Coloration: Skin is not jaundiced.   Neurological:      General: No focal deficit present.      Mental Status: She is alert and oriented to person, place, and time. Mental status is at baseline.   Psychiatric:         Mood and Affect: Mood normal.         Behavior: Behavior normal.         Thought Content: Thought content normal.         Judgment: Judgment normal.      Comments: Slightly anxious         Laboratory:  Recent Labs     02/12/21  1226   WBC 8.1   RBC 3.38*   HEMOGLOBIN 11.1*   HEMATOCRIT 34.8*   .0*   MCH 32.8   MCHC 31.9*   RDW 52.5*   PLATELETCT 292   MPV 10.5     Recent Labs     02/12/21  1226   SODIUM 139   POTASSIUM 3.2*   CHLORIDE 111   CO2 16*   GLUCOSE 119*   BUN 10   CREATININE 0.83   CALCIUM 8.3*     Recent Labs     02/12/21  1226   ALTSGPT <5   ASTSGOT 12   ALKPHOSPHAT 120*   TBILIRUBIN <0.2   LIPASE 53   GLUCOSE 119*     Recent Labs     02/12/21  1226   APTT 33.2   INR 1.02     No results for input(s): NTPROBNP in the last 72 hours.      Recent Labs     02/12/21  1240   TROPONINT 10       Imaging:  CT-ABDOMEN-PELVIS WITH   Final Result      1.  No acute inflammatory process in the  abdomen or pelvis.   2.  Bilateral nephrolithiasis and mild bilateral hydronephrosis. No ureteral stones.   3.  Moderate amount of stool throughout the colon.      DX-CHEST-PORTABLE (1 VIEW)   Final Result      No evidence of acute cardiopulmonary process.            Assessment/Plan:  I anticipate this patient is appropriate for observation status at this time.    * Hypotension due to hypovolemia and medications  Assessment & Plan  IV fluids  Improving.  If not fluid responsive, she is on steroids, please screen for secondary adrenal insufficiency    GI bleeding, intractable N/V, hypovolemia, chronic pain syndrome  Assessment & Plan  Trend Hb.  Anemia is mild  If she has active bleeding then can consider calling GI Dr. Law's group.    History of perforation of posterior duodenum pancreatitis abscess- (present on admission)  Assessment & Plan  Noted.    Other hydronephrosis  Assessment & Plan  Seems to be mild and chronic, no ureteral on CT  Ordered urinalysis  Normal creatinine  If not having pain, no obstructive uropathy or no HASMUKH tomorrow, can simply curbside and follow up with Urology    Immunosuppression due to chronic steroid use  Assessment & Plan  As above    History of rheumatoid arthritis  Assessment & Plan  Reconcile Embrel with Pharmacy and resume  She is NOT on prednisone, she stopped that in April    Tobacco dependence- (present on admission)  Assessment & Plan  I spent 3 minutes, discussing tobacco dependence and cardiac as well as pulmonary risk. Nicotine replacement and smoking cessation instructions. Code 58231      SCDs

## 2021-02-12 NOTE — PROGRESS NOTES
"Follow up patient note  Pain Medicine, Interventional spine and sports physiatry, Physical medicine rehabilitation    Date of Service: 02/12/2021    Chief complaint:   Joint pain      HISTORY    Please see new patient note dated 06/08/2018 by Dr Kerr,  for more details.     [facility]  Patient identification: Mary Martinez 50 y.o. female returns for follow-up of her pain related to rheumatoid arthritis.      Interval history:   Mary reports that she has been having emesis for the last few days, unable to keep food down.  She has had GI bleed and has had about 4 episodes of BRBPR in the last month.  The most recent was last week.  Also, she reports she has been having terrible heartburn that is \"torture\"    She has had night sweats in the last week and feeling short of breath.  She does not report chest pain.    Regarding her pain, symptoms have been stable.  She has not been back to the orthopedic surgeon lately.  Right shoulder pain is constant and limits    Overall, her self-care has been stable, limited by shoulder.  Brushing her hair is still most difficult and she has been wearing hats.    For pain, she is taking percocet 7.5/325 five a day, #150 per month and stable. No side effects from this.  No bowel or bladder changes.  Bowel movements are regular.    Pain is an 8/10 on the NRS.    For her Rheumatoid arthritis, she is still on Embrel, this has started to reduce the severity of her overall joint pain, but the right shoulder is significantly painful      PHQ-9: 16, denies suicidality   ORT: 4    ROS  Unchanged from previous visit 10/01/2020 except as noted in the HPI     Red Flags :   Fever, Chills, Sweats: Denies  Involuntary Weight Loss: Denies  Bowel/Bladder Incontinence: Denies      PMHx:   Past Medical History:   Diagnosis Date   • Arthritis     Rheumatoid -follows with rheumatologist. Has several fractures due to RA.   • ASTHMA     inhalers prn   • Bowel habit changes     4/24/20-constipation and " diarrhea.   • Bronchitis last approx 2018   • Chronic pain 04/24/2020    Due to RA   • Dental disorder     no teeth upper-does not wear her denture. Broken teeth on bottom.   • Heart burn     taking famotidine   • Hiatus hernia syndrome    • History of pancreatitis    • Indigestion     taking famotidine   • Pain     everywhere   • Pneumonia 10/2019   • Psychiatric problem     anxiety and depression   • Rheumatoid arthritis(714.0) 2003       PSHx:   Past Surgical History:   Procedure Laterality Date   • PB ENDOSCOPIC US EXAM, ESOPH  4/29/2020    Procedure: EGD, WITH ENDOSCOPIC US;  Surgeon: Jorge Brooke M.D.;  Location: [facility];  Service: Gastroenterology   • GASTROSCOPY-ENDO  4/29/2020    Procedure: GASTROSCOPY;  Surgeon: Jorge Brooke M.D.;  Location: [facility];  Service: Gastroenterology   • EGD WITH ASP/BX  4/29/2020    Procedure: EGD, WITH ASPIRATION BIOPSY - POSS FNA;  Surgeon: Jorge Brooke M.D.;  Location: [facility];  Service: Gastroenterology   • PB EXPLORATORY OF ABDOMEN N/A 10/4/2019    Procedure: LAPAROTOMY, EXPLORATORY AND REPAIR OF DUODENAL PERFORATION;  Surgeon: James Dumont M.D.;  Location: [facility];  Service: General   • COLONOSCOPY  2018    with upper endoscopy   • FINGER ARTHROPLASTY Right 6/5/2017    Procedure: FINGER ARTHROPLASTY - LONG, RING AND SMALL VOLAR PLATE;  Surgeon: Lobo Rosen M.D.;  Location: SURGERY SAME DAY [facility];  Service:    • FINGER AMPUTATION  6/5/2017    Procedure: FINGER AMPUTATION - LONG, RING AND SMALL AT THE PROXIMAL INTERPHALANGEAL JOINT;  Surgeon: Lobo Rosen M.D.;  Location: SURGERY SAME DAY [facility];  Service:    • FINGER ARTHROPLASTY Right 4/17/2017    Procedure: FINGER ARTHROPLASTY ;  Surgeon: Lobo Rosen M.D.;  Location: SURGERY SAME DAY [facility];  Service:    • FINGER AMPUTATION Right 9/14/2016    Procedure: FINGER AMPUTATION INDEX;   Surgeon: Lobo Rosen M.D.;  Location: SURGERY SAME DAY [facility];  Service:    • IRRIGATION & DEBRIDEMENT ORTHO Right 9/11/2016    Procedure: IRRIGATION & DEBRIDEMENT ORTHO - right index finger;  Surgeon: Madhu Rosen M.D.;  Location: SURGERY [facility];  Service:    • PIP ARTHRODESIS Right 8/29/2016    Procedure: RE-DO INDEX FINGER PROXIMAL INTERPHALANGEAL ARTHRODESIS;  Surgeon: Lobo Rosen M.D.;  Location: SURGERY SAME DAY [facility];  Service:    • BONE GRAFT Right 8/29/2016    Procedure: BONE GRAFT - DISTAL RADIUS ;  Surgeon: Lobo Rosen M.D.;  Location: SURGERY SAME DAY [facility];  Service:    • ARTHRODESIS Right 5/6/2016    Procedure: ARTHRODESIS INDEX FINGER PROXIMAL INTERPHALANGEAL;  Surgeon: Lobo Rosen M.D.;  Location: SURGERY SAME DAY [facility];  Service:    • FOOT RECONSTRUCTION RHEUMATIC Left 7/29/2015    Procedure: FOOT RECONSTRUCTION RHEUMATIC;  Surgeon: Heriberto Alves M.D.;  Location: SURGERY [facility];  Service:    • TOE FUSION Right 5/27/2015    Procedure: TOE FUSION 1ST METATARSALPHALANGEAL JOINT;  Surgeon: Heriberto Alves M.D.;  Location: SURGERY [facility];  Service:    • BONE SPUR EXCISION  5/27/2015    Procedure: BONE SPUR EXCISION METATARSAL HEAD 2-3;  Surgeon: Heriberto Alves M.D.;  Location: SURGERY [facility];  Service:    • HAMMERTOE CORRECTION  5/27/2015    Procedure: HAMMERTOE CORRECTION AND SOFT TISSUE RE-ALINGMENT 2-3 ;  Surgeon: Heriberto Alves M.D.;  Location: SURGERY [facility];  Service:    • DENTAL EXTRACTION(S)  2014    all of upper teeth   • ABDOMINAL ABSCESS DRAINAGE  9/27/2011    Performed by VERO WRIGHT at [facility]   • EMANUEL BY LAPAROSCOPY  9/27/2011    Performed by VERO WRIGHT at [facility]   • APPENDECTOMY  2011    Found out it had ruptured prior to [facility] surgery   • REPEAT C SECTION  2008   • REPEAT C SECTION  2005   • REPEAT C SECTION  1999   •  PRIMARY C SECTION  1991   • TONSILLECTOMY  1982   • WRIST EXPLORATION Left 1980's    fractured wrist-no hardware       Family history   History reviewed. No pertinent family history.      Medications:   Outpatient Medications Marked as Taking for the 2/12/21 encounter (Office Visit) with Jayy Kerr M.D.   Medication Sig Dispense Refill   • [START ON 2/14/2021] oxyCODONE-acetaminophen (PERCOCET) 7.5-325 MG per tablet Take 1 tablet by mouth every four hours as needed for up to 30 days. 150 tablet 0   • [START ON 3/16/2021] oxyCODONE-acetaminophen (PERCOCET) 7.5-325 MG per tablet Take 1 tablet by mouth every four hours as needed for up to 30 days. 150 tablet 0   • QUEtiapine (SEROQUEL) 25 MG Tab      • ENBREL 50 MG/ML Solution Prefilled Syringe INJECT 50 MG ONCE WEEKLY UNDERNEATH THE SKIN  FOR 28 DAYS     • Naloxone (NARCAN) 4 MG/0.1ML Liquid One spray in one nostril for overdose and call 911. 1 Package 0   • ondansetron (ZOFRAN ODT) 8 MG TABLET DISPERSIBLE Take 8 mg by mouth every 8 hours as needed.     • QUEtiapine (SEROQUEL) 100 MG Tab Take 100 mg by mouth every evening.     • PARoxetine (PAXIL) 30 MG Tab Take 60 mg by mouth every evening.     • albuterol (VENTOLIN OR PROVENTIL) 108 (90 BASE) MCG/ACT AERS inhalation aerosol Inhale 2 Puffs by mouth every four hours as needed for Shortness of Breath.         Allergies:   Allergies   Allergen Reactions   • Penicillins Anaphylaxis and Hives     Tolerates cephalosporins; reports throat swelling with PCN   • Nitrous Oxide Vomiting   • Aripiprazole [Abilify] Nausea     Spasms, shaking       Social Hx:   Social History     Socioeconomic History   • Marital status:      Spouse name: Not on file   • Number of children: Not on file   • Years of education: Not on file   • Highest education level: Not on file   Occupational History   • Not on file   Tobacco Use   • Smoking status: Current Every Day Smoker     Packs/day: 1.00     Years: 30.00     Pack years: 30.00      "Types: Cigarettes   • Smokeless tobacco: Never Used   • Tobacco comment: 4/24/20-now smoking approx 1/2 PPD.   Substance and Sexual Activity   • Alcohol use: Never   • Drug use: Never   • Sexual activity: Not on file   Other Topics Concern   •  Service No   • Blood Transfusions Yes   • Caffeine Concern No   • Occupational Exposure No   • Hobby Hazards No   • Sleep Concern No   • Stress Concern Yes   • Weight Concern No   • Special Diet No   • Back Care No   • Exercise Yes   • Bike Helmet Yes   • Seat Belt Yes   • Self-Exams Yes   Social History Narrative    ** Merged History Encounter **          Social Determinants of Health     Financial Resource Strain:    • Difficulty of Paying Living Expenses:    Food Insecurity:    • Worried About Running Out of Food in the Last Year:    • Ran Out of Food in the Last Year:    Transportation Needs:    • Lack of Transportation (Medical):    • Lack of Transportation (Non-Medical):    Physical Activity:    • Days of Exercise per Week:    • Minutes of Exercise per Session:    Stress:    • Feeling of Stress :    Social Connections:    • Frequency of Communication with Friends and Family:    • Frequency of Social Gatherings with Friends and Family:    • Attends Bahai Services:    • Active Member of Clubs or Organizations:    • Attends Club or Organization Meetings:    • Marital Status:    Intimate Partner Violence:    • Fear of Current or Ex-Partner:    • Emotionally Abused:    • Physically Abused:    • Sexually Abused:          EXAMINATION     Physical Exam:   Vitals: BP (!) 86/50 (BP Location: Right arm, Patient Position: Sitting, BP Cuff Size: Small adult)   Pulse (!) 108   Temp 36.4 °C (97.6 °F)   Resp 16   Ht 1.651 m (5' 5\")   Wt 55.6 kg (122 lb 9.2 oz)   SpO2 97%     Constitutional:   Body Habitus: Body mass index is 20.4 kg/m².  Cooperation: Fully cooperates with exam  Appearance: Appears pale, She is wearing a mask  Respiratory-  breathing comfortable on " room air, no audible wheezing  Cardiovascular- no lower extremity edema is observed.  Heart rate is regular, but tachycardic  Psychiatric- alert and oriented ×3. Normal affect.     Musculoskeletal:  Right shoulder with crepitus and pain with ROM less than 90 degrees before pain worsens.      Partial hand amputation on the right at the MCPs; ulnar deviation on the left with MCP subluxation, mild atrophy of the hand intrinsics with wasting of the thenar eminence.     Gait is steady without assist device.      MEDICAL DECISION MAKING    DATA    Labs: UDS 10/30/2018 consistent with medications.  Oxycodone and metabolites without other substances   UDS 04/19/2019 consistent with medications. Oxycodone and metabolites without other substances   UDS 07/19/2019 consistent with medications.  Oxycodone and metabolites without other substances   UDS 11/22/2019 consistent with medications.  Oxycodone and metabolites without other substances   UDS 02/20/2020 absent for Oxycodone and metabolites without other substances  UDS 06/10/2020 positive for oxycodone and metabolites without other substances  UDS 08/18/2020 positive for oxycodone and metabolites, positive for EtG without EtS    Imaging:    Films reviewed.  These are my reads:    Xray right shoulder 08/20/2020  There is note of severe degenerative change of the glenohumeral joint.  No fracture.  Erosive changes, consistent with known history of RA    MRI cervical spine 07/31/2018:    There is is note of motion at the atlantodental level with 5mm atlantodental inverval in flexion that reduces to 1-2 mm in extension.  Mild retrolisthesis of C4 on C5 and anterolisthesis of C5- on C6.  Disc extrusion at C6-7 with mild central canal stenosis    Xray left shoulder 2/5/2018 Humeral head is elevated.  Glenohumeral joint arthritis.    Reports reviewed:    Xray right shoulder 08/20/2020 [facility]  Impression  Extensive erosive changes of the humeral head and degenerative changes of the  glenohumeral joint.  Findings are concerning for inflammatory arthropathy such as rheumatoid arthritis.      Xray cervical spine 11/14/2018  1. No acute fracture  2. Persistent motion at the C1-2 joint as before, suggesting atlantoaxial instability  3. Minimal instability noted at C5-6 level as described.    MRI cervical spine 07/31/2018  1. Widening of the atlantodental interal in neutral and flexion positions with a 5mm space which reduces to 1-2 mm in extension  2. 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 Gastrointestinal hemorrhage, unspecified gastrointestinal hemorrhage type  K92.2   2. Hypotension, unspecified hypotension type  I95.9   3. 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 Chronic, continuous use of opioids  F11.90   5. Rheumatoid arthritis involving multiple sites with positive rheumatoid factor ([facility])  M05.79   6. Arthritis of glenohumeral joint  M19.019   7. Depression, unspecified depression type  F32.9   8. Amputation of right hand, sequela ([facility])  S68.411S   9. Hand deformity, acquired, left  M21.942         ASSESSMENT and PLAN:     Mary Mckenzielins 50 y.o. female with rheumatoid arthritis    Mary was seen today for follow-up.    Orders and management for this visit:    Orders Placed This Encounter   • URINE DRUG SCREEN W/CONF (SEND OUT)   • Patient has been identified as having a positive depression screening. Appropriate orders and counseling have been given.   • oxyCODONE-acetaminophen (PERCOCET) 7.5-325 MG per tablet   • oxyCODONE-acetaminophen (PERCOCET) 7.5-325 MG per tablet   • Consent for Opiate Prescription   • Controlled Substance Treatment Agreement       1. Discussed the symptoms that she has been having related to her GI.  She has been having GI  bleed with decreased PO intake with symptomatic hypotension and shortness of breath.  Called [facility] for transfer to ED.  Called both [facility] and [facility] ED to let them know that she could be on her way.  2. Discussed percocet 7.5/325#150 per month.   reviewed.  Plan to continue current dose.  Script given for this month and next month.  Repeat UDS.  3. Hold orthopedic follow-up for now, pending current evaluation.  4. Continue Embrel with Dr. Dela Cruz  5. Continue psychology and psychiatry   6. Continue diclofenac gel for shoulders.  Plan to follow-up with Ortho and Neurosurgery in the future.    In prescribing controlled substances to this patient, I certify that I have obtained and reviewed the medical history of Mary Martinez. I have also made a good aristides effort to obtain applicable records from other providers who have treated the patient and records did not demonstrate any increased risk of substance abuse that would prevent me from prescribing controlled substances.     I have conducted a physical exam and documented it. I have reviewed Ms. Martinez’s prescription history as maintained by the [place] Prescription Monitoring Program.   ORT 4, indicates moderate risk    I have assessed the patient’s risk for abuse, dependency, and addiction using the validated Opioid Risk Tool available at https://www.mdcalc.com/aueyru-dqor-bilt-ort-narcotic-abuse.     Given the above, I believe the benefits of controlled substance therapy outweigh the risks. The reasons for prescribing controlled substances include non-narcotic, oral analgesic alternatives have been inadequate for pain control and in my professional opinion, controlled substances are the only reasonable choice for this patient because her pain was note adequately controlled with alternatives and she has chronic progressive disease. Accordingly, I have discussed the risk and benefits, treatment plan, and alternative therapies with the patient.        Follow up: 2 months      Please note that this dictation was created using voice recognition software. I have made every reasonable attempt to correct obvious errors but there may be errors of grammar and content that I may have overlooked prior to finalization of this note.      Jayy Kerr MD  Interventional Spine and Sports Physiatry  Physical Medicine and Rehabilitation  [facility]      CC: Dr. Fidencio Christopher

## 2021-02-12 NOTE — ASSESSMENT & PLAN NOTE
IV fluids  Improving.  If not fluid responsive, she is on steroids, please screen for secondary adrenal insufficiency

## 2021-02-12 NOTE — ED NOTES
Pharmacy Medication Reconciliation      Medication reconciliation updated and complete per pt at bedside  Allergies have been verified and updated   No oral ABX within the last 14 days  Patient home pharmacy:[facility]

## 2021-02-13 VITALS
TEMPERATURE: 97.9 F | RESPIRATION RATE: 18 BRPM | DIASTOLIC BLOOD PRESSURE: 64 MMHG | BODY MASS INDEX: 21.62 KG/M2 | HEIGHT: 63 IN | WEIGHT: 122 LBS | HEART RATE: 88 BPM | OXYGEN SATURATION: 95 % | SYSTOLIC BLOOD PRESSURE: 96 MMHG

## 2021-02-13 LAB
ANION GAP SERPL CALC-SCNC: 11 MMOL/L (ref 7–16)
BUN SERPL-MCNC: 7 MG/DL (ref 8–22)
CALCIUM SERPL-MCNC: 8.3 MG/DL (ref 8.4–10.2)
CHLORIDE SERPL-SCNC: 110 MMOL/L (ref 96–112)
CO2 SERPL-SCNC: 20 MMOL/L (ref 20–33)
CREAT SERPL-MCNC: 0.71 MG/DL (ref 0.5–1.4)
ERYTHROCYTE [DISTWIDTH] IN BLOOD BY AUTOMATED COUNT: 52 FL (ref 35.9–50)
GLUCOSE SERPL-MCNC: 93 MG/DL (ref 65–99)
HCT VFR BLD AUTO: 31.3 % (ref 37–47)
HGB BLD-MCNC: 10.1 G/DL (ref 12–16)
MCH RBC QN AUTO: 33 PG (ref 27–33)
MCHC RBC AUTO-ENTMCNC: 32.3 G/DL (ref 33.6–35)
MCV RBC AUTO: 102.3 FL (ref 81.4–97.8)
PLATELET # BLD AUTO: 284 K/UL (ref 164–446)
PMV BLD AUTO: 10.9 FL (ref 9–12.9)
POTASSIUM SERPL-SCNC: 4 MMOL/L (ref 3.6–5.5)
RBC # BLD AUTO: 3.06 M/UL (ref 4.2–5.4)
SARS-COV-2 RNA RESP QL NAA+PROBE: NOTDETECTED
SODIUM SERPL-SCNC: 141 MMOL/L (ref 135–145)
SPECIMEN SOURCE: NORMAL
WBC # BLD AUTO: 7.3 K/UL (ref 4.8–10.8)

## 2021-02-13 PROCEDURE — 99217 PR OBSERVATION CARE DISCHARGE: CPT | Performed by: HOSPITALIST

## 2021-02-13 PROCEDURE — G0378 HOSPITAL OBSERVATION PER HR: HCPCS

## 2021-02-13 PROCEDURE — 80048 BASIC METABOLIC PNL TOTAL CA: CPT

## 2021-02-13 PROCEDURE — 85027 COMPLETE CBC AUTOMATED: CPT

## 2021-02-13 PROCEDURE — 90471 IMMUNIZATION ADMIN: CPT

## 2021-02-13 PROCEDURE — 700105 HCHG RX REV CODE 258: Performed by: INTERNAL MEDICINE

## 2021-02-13 PROCEDURE — 94760 N-INVAS EAR/PLS OXIMETRY 1: CPT

## 2021-02-13 PROCEDURE — 90686 IIV4 VACC NO PRSV 0.5 ML IM: CPT

## 2021-02-13 PROCEDURE — 94640 AIRWAY INHALATION TREATMENT: CPT

## 2021-02-13 PROCEDURE — 700102 HCHG RX REV CODE 250 W/ 637 OVERRIDE(OP): Performed by: INTERNAL MEDICINE

## 2021-02-13 PROCEDURE — 700111 HCHG RX REV CODE 636 W/ 250 OVERRIDE (IP)

## 2021-02-13 PROCEDURE — A9270 NON-COVERED ITEM OR SERVICE: HCPCS | Performed by: INTERNAL MEDICINE

## 2021-02-13 RX ADMIN — OXYCODONE HYDROCHLORIDE AND ACETAMINOPHEN 1 TABLET: 7.5; 325 TABLET ORAL at 07:44

## 2021-02-13 RX ADMIN — GLYCOPYRROLATE 1 CAPSULE: 15.6 CAPSULE RESPIRATORY (INHALATION) at 08:01

## 2021-02-13 RX ADMIN — NICOTINE 14 MG: 14 PATCH TRANSDERMAL at 05:51

## 2021-02-13 RX ADMIN — INFLUENZA A VIRUS A/GUANGDONG-MAONAN/SWL1536/2019 CNIC-1909 (H1N1) ANTIGEN (FORMALDEHYDE INACTIVATED), INFLUENZA A VIRUS A/HONG KONG/2671/2019 (H3N2) ANTIGEN (FORMALDEHYDE INACTIVATED), INFLUENZA B VIRUS B/PHUKET/3073/2013 ANTIGEN (FORMALDEHYDE INACTIVATED), AND INFLUENZA B VIRUS B/WASHINGTON/02/2019 ANTIGEN (FORMALDEHYDE INACTIVATED) 0.5 ML: 15; 15; 15; 15 INJECTION, SUSPENSION INTRAMUSCULAR at 12:20

## 2021-02-13 RX ADMIN — SODIUM CHLORIDE, POTASSIUM CHLORIDE, SODIUM LACTATE AND CALCIUM CHLORIDE: 600; 310; 30; 20 INJECTION, SOLUTION INTRAVENOUS at 09:14

## 2021-02-13 RX ADMIN — FAMOTIDINE 20 MG: 20 TABLET ORAL at 05:48

## 2021-02-13 RX ADMIN — OXYCODONE HYDROCHLORIDE AND ACETAMINOPHEN 1 TABLET: 7.5; 325 TABLET ORAL at 02:05

## 2021-02-13 RX ADMIN — SODIUM CHLORIDE, POTASSIUM CHLORIDE, SODIUM LACTATE AND CALCIUM CHLORIDE: 600; 310; 30; 20 INJECTION, SOLUTION INTRAVENOUS at 02:00

## 2021-02-13 RX ADMIN — MONTELUKAST SODIUM 1 G: 4 TABLET, CHEWABLE ORAL at 05:48

## 2021-02-13 ASSESSMENT — PAIN DESCRIPTION - PAIN TYPE: TYPE: CHRONIC PAIN

## 2021-02-13 NOTE — DISCHARGE SUMMARY
"Discharge Summary    CHIEF COMPLAINT ON ADMISSION  Chief Complaint   Patient presents with   • Bloody Stools     Report bright red bloody stools x1 month. Reports \"always having diarrhea\".    • Abdominal Pain     12.5 phenergan, fent 100 given en route for epigastric abd pain. Denies vomiting.        Reason for Admission  EMS     Admission Date  2/12/2021    CODE STATUS  Full Code    HPI & HOSPITAL COURSE  This is a 50 y.o. female here with bright blood per rectum.  The patient's blood seems to be secondary to hemorrhoids.  The patient's H&H was monitored and she was given fluid resuscitation overnight.  The patient's hemorrhoid at this point has calm down and she is no longer bleeding.  Patient at this point lives at home with her  and has good social situation and is able to afford her medications.  She is eating well she is moving her bowels well she is urinating well.  She is walking and ambulating well.  She will have the ability to go to her follow-up symptoms the patient can be discharged with good follow-ups and outpatient management.    Therefore, she is discharged in good and stable condition to home with close outpatient follow-up.    The patient recovered much more quickly than anticipated on admission.    Discharge Date  12/13/2021    FOLLOW UP ITEMS POST DISCHARGE  Follow with the primary care physician in 7 to 10 days    DISCHARGE DIAGNOSES  Principal Problem:    Hypotension due to hypovolemia and medications POA: Unknown  Active Problems:    History of perforation of posterior duodenum pancreatitis abscess POA: Yes    GI bleeding, intractable N/V, hypovolemia, chronic pain syndrome POA: Unknown    History of rheumatoid arthritis POA: Unknown    Other hydronephrosis POA: Unknown    Tobacco dependence POA: Yes  Resolved Problems:    * No resolved hospital problems. *      FOLLOW UP  No future appointments.  No follow-up provider specified.    MEDICATIONS ON DISCHARGE     Medication List    "   CONTINUE taking these medications      Instructions   albuterol 108 (90 Base) MCG/ACT Aers inhalation aerosol   Inhale 2 Puffs by mouth every four hours as needed for Shortness of Breath.  Dose: 2 Puff     Enbrel 50 MG/ML Sosy  Generic drug: Etanercept   INJECT 50 MG ONCE WEEKLY UNDERNEATH THE SKIN  FOR 28 DAYS     Naloxone 4 MG/0.1ML Liqd  Commonly known as: NARCAN   One spray in one nostril for overdose and call 911.     ondansetron 8 MG Tbdp  Commonly known as: ZOFRAN ODT   Take 8 mg by mouth every 8 hours as needed.  Dose: 8 mg     oxyCODONE-acetaminophen 7.5-325 MG per tablet  Start taking on: February 14, 2021  Commonly known as: PERCOCET   Take 1 tablet by mouth every four hours as needed for up to 30 days.  Dose: 1 tablet     PARoxetine 30 MG Tabs  Commonly known as: PAXIL   Take 60 mg by mouth every evening.  Dose: 60 mg     QUEtiapine 100 MG Tabs  Commonly known as: Seroquel   Take 100 mg by mouth every evening.  Dose: 100 mg            Allergies  Allergies   Allergen Reactions   • Penicillins Anaphylaxis and Hives     Tolerates cephalosporins; reports throat swelling with PCN   • Aripiprazole Nausea     Spasms, shaking     • Nitrous Oxide Vomiting       DIET  Orders Placed This Encounter   Procedures   • Diet Order Diet: Regular     Standing Status:   Standing     Number of Occurrences:   1     Order Specific Question:   Diet:     Answer:   Regular [1]       ACTIVITY  As tolerated.  Weight bearing as tolerated    CONSULTATIONS  None    PROCEDURES  None    LABORATORY  Lab Results   Component Value Date    SODIUM 141 02/13/2021    POTASSIUM 4.0 02/13/2021    CHLORIDE 110 02/13/2021    CO2 20 02/13/2021    GLUCOSE 93 02/13/2021    BUN 7 (L) 02/13/2021    CREATININE 0.71 02/13/2021    CREATININE 0.7 11/29/2008        Lab Results   Component Value Date    WBC 7.3 02/13/2021    HEMOGLOBIN 10.1 (L) 02/13/2021    HEMATOCRIT 31.3 (L) 02/13/2021    PLATELETCT 284 02/13/2021        Total time of the discharge  process exceeds 35 minutes.

## 2021-02-13 NOTE — DISCHARGE INSTRUCTIONS
Discharge Instructions    Discharged to home by car with relative. Discharged via wheelchair, hospital escort: Yes.  Special equipment needed: Not Applicable    Be sure to schedule a follow-up appointment with your primary care doctor or any specialists as instructed.     Discharge Plan:   Influenza Vaccine Indication: Indicated: 9 to 64 years of age    I understand that a diet low in cholesterol, fat, and sodium is recommended for good health. Unless I have been given specific instructions below for another diet, I accept this instruction as my diet prescription.   Other diet: na    Special Instructions: None    · Is patient discharged on Warfarin / Coumadin?   No       Hypotension  As your heart beats, it forces blood through your body. Hypotension, commonly called low blood pressure, is when the force of blood pumping through your arteries is too weak. Arteries are blood vessels that carry blood from the heart throughout the body. Depending on the cause and severity, hypotension may be harmless (benign) or may cause serious problems (be critical).  When blood pressure is too low, you may not get enough blood to your brain or to the rest of your organs. This can cause weakness, light-headedness, rapid heartbeat, and fainting.  What are the causes?  This condition may be caused by:  · Blood loss.  · Loss of body fluids (dehydration).  · Heart problems.  · Hormone (endocrine) problems.  · Pregnancy.  · Severe infection.  · Lack of certain nutrients.  · Severe allergic reactions (anaphylaxis).  · Certain medicines, such as blood pressure medicine or medicines that make the body lose excess fluids (diuretics). Sometimes, hypotension may be caused by not taking medicine as directed, such as taking too much of a certain medicine.  What increases the risk?  The following factors may make you more likely to develop this condition:  · Age. Risk increases as you get older.  · Conditions that affect the heart or the central  "nervous system.  · Taking certain medicines, such as blood pressure medicine or diuretics.  · Being pregnant.  What are the signs or symptoms?  Common symptoms of this condition include:  · Weakness.  · Light-headedness.  · Dizziness.  · Blurred vision.  · Fatigue.  · Rapid heartbeat.  · Fainting, in severe cases.  How is this diagnosed?  This condition is diagnosed based on:  · Your medical history.  · Your symptoms.  · Your blood pressure measurement. Your health care provider will check your blood pressure when you are:  ? Lying down.  ? Sitting.  ? Standing.  A blood pressure reading is recorded as two numbers, such as \"120 over 80\" (or 120/80). The first (\"top\") number is called the systolic pressure. It is a measure of the pressure in your arteries as your heart beats. The second (\"bottom\") number is called the diastolic pressure. It is a measure of the pressure in your arteries when your heart relaxes between beats. Blood pressure is measured in a unit called mm Hg. Healthy blood pressure for most adults is 120/80. If your blood pressure is below 90/60, you may be diagnosed with hypotension.  Other information or tests that may be used to diagnose hypotension include:  · Your other vital signs, such as your heart rate and temperature.  · Blood tests.  · Tilt table test. For this test, you will be safely secured to a table that moves you from a lying position to an upright position. Your heart rhythm and blood pressure will be monitored during the test.  How is this treated?  Treatment for this condition may include:  · Changing your diet. This may involve eating more salt (sodium) or drinking more water.  · Taking medicines to raise your blood pressure.  · Changing the dosage of certain medicines you are taking that might be lowering your blood pressure.  · Wearing compression stockings. These stockings help to prevent blood clots and reduce swelling in your legs.  In some cases, you may need to go to the " hospital for:  · Fluid replacement. This means you will receive fluids through an IV.  · Blood replacement. This means you will receive donated blood through an IV (transfusion).  · Treating an infection or heart problems, if this applies.  · Monitoring. You may need to be monitored while medicines that you are taking wear off.  Follow these instructions at home:  Eating and drinking    · Drink enough fluid to keep your urine pale yellow.  · Eat a healthy diet, and follow instructions from your health care provider about eating or drinking restrictions. A healthy diet includes:  ? Fresh fruits and vegetables.  ? Whole grains.  ? Lean meats.  ? Low-fat dairy products.  · Eat extra salt only as directed. Do not add extra salt to your diet unless your health care provider told you to do that.  · Eat frequent, small meals.  · Avoid standing up suddenly after eating.  Medicines  · Take over-the-counter and prescription medicines only as told by your health care provider.  ? Follow instructions from your health care provider about changing the dosage of your current medicines, if this applies.  ? Do not stop or adjust any of your medicines on your own.  General instructions    · Wear compression stockings as told by your health care provider.  · Get up slowly from lying down or sitting positions. This gives your blood pressure a chance to adjust.  · Avoid hot showers and excessive heat as directed by your health care provider.  · Return to your normal activities as told by your health care provider. Ask your health care provider what activities are safe for you.  · Do not use any products that contain nicotine or tobacco, such as cigarettes, e-cigarettes, and chewing tobacco. If you need help quitting, ask your health care provider.  · Keep all follow-up visits as told by your health care provider. This is important.  Contact a health care provider if you:  · Vomit.  · Have diarrhea.  · Have a fever for more than 2-3  days.  · Feel more thirsty than usual.  · Feel weak and tired.  Get help right away if you:  · Have chest pain.  · Have a fast or irregular heartbeat.  · Develop numbness in any part of your body.  · Cannot move your arms or your legs.  · Have trouble speaking.  · Become sweaty or feel light-headed.  · Faint.  · Feel short of breath.  · Have trouble staying awake.  · Feel confused.  Summary  · Hypotension is when the force of blood pumping through your arteries is too weak.  · Hypotension may be harmless (benign) or may cause serious problems (be critical).  · Treatment for this condition may include changing your diet, changing your medicines, and wearing compression stockings.  · In some cases, you may need to go to the hospital for fluid or blood replacement.  This information is not intended to replace advice given to you by your health care provider. Make sure you discuss any questions you have with your health care provider.  Document Released: 12/18/2006 Document Revised: 06/13/2019 Document Reviewed: 06/13/2019  [employer] Patient Education © 2020 [employer]      Depression / Suicide Risk    As you are discharged from this formerly [facility] facility, it is important to learn how to keep safe from harming yourself.    Recognize the warning signs:  · Abrupt changes in personality, positive or negative- including increase in energy   · Giving away possessions  · Change in eating patterns- significant weight changes-  positive or negative  · Change in sleeping patterns- unable to sleep or sleeping all the time   · Unwillingness or inability to communicate  · Depression  · Unusual sadness, discouragement and loneliness  · Talk of wanting to die  · Neglect of personal appearance   · Rebelliousness- reckless behavior  · Withdrawal from people/activities they love  · Confusion- inability to concentrate     If you or a loved one observes any of these behaviors or has concerns about self-harm, here's what you can  do:  · Talk about it- your feelings and reasons for harming yourself  · Remove any means that you might use to hurt yourself (examples: pills, rope, extension cords, firearm)  · Get professional help from the community (Mental Health, Substance Abuse, psychological counseling)  · Do not be alone:Call your Safe Contact- someone whom you trust who will be there for you.  · Call your local CRISIS HOTLINE 303-6590 or 675-723-1009  · Call your local Children's Mobile Crisis Response Team [place] (122) 927-0976 or www.[employer]  · Call the toll free National Suicide Prevention Hotlines   · National Suicide Prevention Lifeline 880-001-ETDI (8141)  · National Hope Line Network 800-SUICIDE (993-0848)

## 2021-02-13 NOTE — PROGRESS NOTES
Report received from LEANNE Palm. Plan of care discussed. Patient resting comfortably in bed, declines any further needs at this time. Safety precautions in place.

## 2021-02-13 NOTE — ASSESSMENT & PLAN NOTE
I spent 3 minutes, discussing tobacco dependence and cardiac as well as pulmonary risk. Nicotine replacement and smoking cessation instructions. Code 07548

## 2021-02-13 NOTE — PROGRESS NOTES
Pt has soft B/P still. Notified Dr Mackay about it. Pt is asymptomatic and is stable. No new orders.

## 2021-02-13 NOTE — CARE PLAN
Problem: Communication  Goal: The ability to communicate needs accurately and effectively will improve  Outcome: PROGRESSING AS EXPECTED     Problem: Safety  Goal: Will remain free from injury  Outcome: PROGRESSING AS EXPECTED     Problem: Venous Thromboembolism (VTW)/Deep Vein Thrombosis (DVT) Prevention:  Goal: Patient will participate in Venous Thrombosis (VTE)/Deep Vein Thrombosis (DVT)Prevention Measures  Outcome: PROGRESSING AS EXPECTED     Problem: Bowel/Gastric:  Goal: Normal bowel function is maintained or improved  Outcome: PROGRESSING AS EXPECTED     Problem: Pain Management  Goal: Pain level will decrease to patient's comfort goal  Outcome: PROGRESSING AS EXPECTED     Problem: Respiratory:  Goal: Respiratory status will improve  Outcome: PROGRESSING AS EXPECTED

## 2021-02-17 ENCOUNTER — APPOINTMENT (OUTPATIENT)
Dept: PHYSICAL MEDICINE AND REHAB | Facility: MEDICAL CENTER | Age: 51
End: 2021-02-17
Payer: MEDICAID

## 2021-03-12 ENCOUNTER — OFFICE VISIT (OUTPATIENT)
Dept: PHYSICAL MEDICINE AND REHAB | Facility: MEDICAL CENTER | Age: 51
End: 2021-03-12
Payer: MEDICAID

## 2021-03-12 VITALS
DIASTOLIC BLOOD PRESSURE: 80 MMHG | RESPIRATION RATE: 16 BRPM | OXYGEN SATURATION: 96 % | HEIGHT: 63 IN | BODY MASS INDEX: 19.14 KG/M2 | SYSTOLIC BLOOD PRESSURE: 110 MMHG | HEART RATE: 122 BPM | TEMPERATURE: 96 F | WEIGHT: 108.03 LBS

## 2021-03-12 DIAGNOSIS — M21.942 HAND DEFORMITY, ACQUIRED, LEFT: ICD-10-CM

## 2021-03-12 DIAGNOSIS — F32.A DEPRESSION, UNSPECIFIED DEPRESSION TYPE: ICD-10-CM

## 2021-03-12 DIAGNOSIS — F11.90 CHRONIC, CONTINUOUS USE OF OPIOIDS: ICD-10-CM

## 2021-03-12 DIAGNOSIS — M19.019 ARTHRITIS OF GLENOHUMERAL JOINT: ICD-10-CM

## 2021-03-12 DIAGNOSIS — M05.79 RHEUMATOID ARTHRITIS INVOLVING MULTIPLE SITES WITH POSITIVE RHEUMATOID FACTOR (HCC): ICD-10-CM

## 2021-03-12 DIAGNOSIS — M53.2X1 ATLANTOAXIAL INSTABILITY: ICD-10-CM

## 2021-03-12 PROCEDURE — 99214 OFFICE O/P EST MOD 30 MIN: CPT | Performed by: PHYSICAL MEDICINE & REHABILITATION

## 2021-03-12 RX ORDER — OXYCODONE AND ACETAMINOPHEN 7.5; 325 MG/1; MG/1
1 TABLET ORAL EVERY 4 HOURS PRN
Qty: 150 TABLET | Refills: 0 | Status: SHIPPED | OUTPATIENT
Start: 2021-04-11 | End: 2021-05-06 | Stop reason: SDUPTHER

## 2021-03-12 RX ORDER — OXYCODONE AND ACETAMINOPHEN 7.5; 325 MG/1; MG/1
1 TABLET ORAL EVERY 4 HOURS PRN
Qty: 150 TABLET | Refills: 0 | Status: SHIPPED | OUTPATIENT
Start: 2021-03-15 | End: 2021-04-14

## 2021-03-12 ASSESSMENT — PAIN SCALES - GENERAL: PAINLEVEL: 10=SEVERE PAIN

## 2021-03-12 ASSESSMENT — PATIENT HEALTH QUESTIONNAIRE - PHQ9
5. POOR APPETITE OR OVEREATING: 2 - MORE THAN HALF THE DAYS
CLINICAL INTERPRETATION OF PHQ2 SCORE: 6
SUM OF ALL RESPONSES TO PHQ QUESTIONS 1-9: 22

## 2021-03-12 ASSESSMENT — FIBROSIS 4 INDEX: FIB4 SCORE: 0.96

## 2021-03-12 NOTE — PROGRESS NOTES
Follow up patient note  Pain Medicine, Interventional spine and sports physiatry, Physical medicine rehabilitation    Date of Service: 03/12/2021    Chief complaint:   Joint pain      HISTORY    Please see new patient note dated 06/08/2018 by Dr Kerr,  for more details.     [facility]  Patient identification: Mary Martinez 50 y.o. female returns for follow-up of her pain related to rheumatoid arthritis.      Interval history:   Mary reports that she has not eaten in two weeks, she has lost weight 122 down to 108lb since her visit on 02/12/2021.  ED visit reviewed after our last visit.  She has been having emesis and cannot keep anything down and has been having more heartburn, that has been progressing without bloody emesis.  Some issue with access to Dr. Graf and patient reports diagnostic studies continue to be within normal.  There is some kind of problem with her PCP access, not sure what is going on there, either.  Her psychologist is trying to coordinate this.    She has been having pain and trouble with selfcare and reports intermittent tingling in her hands.Her self-care is more difficult and she has shaved her head because she cannot do her hair.  Right shoulder is still painful.    She is getting light-headed when she stands.  After the last visit, she went to the ED.  She has purchased a blood pressure cuff.  This is usually no more than 83-90 systolic.  She has been having heart-burn.    For pain, she is taking percocet 7.5/325 five a day, #150 per month and stable. No side effects from this.  No bowel or bladder changes.  Bowel movements are regular.    Pain is an 8-9/10 on the NRS.    For her Rheumatoid arthritis, she is still on Embrel, this has started to reduce the severity of her overall joint pain, but the right shoulder is significantly painful      PHQ-9: 16, denies suicidality   ORT: 4    ROS  Unchanged from previous visit 10/01/2020 except as noted in the HPI     Red Flags :   Fever,  Chills, Sweats: Denies  Involuntary Weight Loss: Denies  Bowel/Bladder Incontinence: Denies      PMHx:   Past Medical History:   Diagnosis Date   • Arthritis     Rheumatoid -follows with rheumatologist. Has several fractures due to RA.   • ASTHMA     inhalers prn   • Bowel habit changes     4/24/20-constipation and diarrhea.   • Bronchitis last approx 2018   • Chronic pain 04/24/2020    Due to RA   • Dental disorder     no teeth upper-does not wear her denture. Broken teeth on bottom.   • Heart burn     taking famotidine   • Hiatus hernia syndrome    • History of pancreatitis    • Indigestion     taking famotidine   • Pain     everywhere   • Pneumonia 10/2019   • Psychiatric problem     anxiety and depression   • Rheumatoid arthritis(714.0) 2003       PSHx:   Past Surgical History:   Procedure Laterality Date   • PB ENDOSCOPIC US EXAM, ESOPH  4/29/2020    Procedure: EGD, WITH ENDOSCOPIC US;  Surgeon: Jorge Brooke M.D.;  Location: [facility];  Service: Gastroenterology   • GASTROSCOPY-ENDO  4/29/2020    Procedure: GASTROSCOPY;  Surgeon: Jorge Brooke M.D.;  Location: [facility];  Service: Gastroenterology   • EGD WITH ASP/BX  4/29/2020    Procedure: EGD, WITH ASPIRATION BIOPSY - POSS FNA;  Surgeon: Jorge Brooke M.D.;  Location: [facility];  Service: Gastroenterology   • PB EXPLORATORY OF ABDOMEN N/A 10/4/2019    Procedure: LAPAROTOMY, EXPLORATORY AND REPAIR OF DUODENAL PERFORATION;  Surgeon: James Dumont M.D.;  Location: SURGERY [facility];  Service: General   • COLONOSCOPY  2018    with upper endoscopy   • FINGER ARTHROPLASTY Right 6/5/2017    Procedure: FINGER ARTHROPLASTY - LONG, RING AND SMALL VOLAR PLATE;  Surgeon: Lobo Rosen M.D.;  Location: SURGERY SAME DAY [facility];  Service:    • FINGER AMPUTATION  6/5/2017    Procedure: FINGER AMPUTATION - LONG, RING AND SMALL AT THE PROXIMAL INTERPHALANGEAL JOINT;  Surgeon: Lobo  SHERICE Rosen M.D.;  Location: SURGERY SAME DAY [facility];  Service:    • FINGER ARTHROPLASTY Right 4/17/2017    Procedure: FINGER ARTHROPLASTY ;  Surgeon: Lobo Rosen M.D.;  Location: SURGERY SAME DAY [facility];  Service:    • FINGER AMPUTATION Right 9/14/2016    Procedure: FINGER AMPUTATION INDEX;  Surgeon: Lobo Rosen M.D.;  Location: SURGERY SAME DAY [facility];  Service:    • IRRIGATION & DEBRIDEMENT ORTHO Right 9/11/2016    Procedure: IRRIGATION & DEBRIDEMENT ORTHO - right index finger;  Surgeon: Madhu Rosen M.D.;  Location: SURGERY [facility];  Service:    • PIP ARTHRODESIS Right 8/29/2016    Procedure: RE-DO INDEX FINGER PROXIMAL INTERPHALANGEAL ARTHRODESIS;  Surgeon: Lobo Rosen M.D.;  Location: SURGERY SAME DAY [facility];  Service:    • BONE GRAFT Right 8/29/2016    Procedure: BONE GRAFT - DISTAL RADIUS ;  Surgeon: Lobo Rosen M.D.;  Location: SURGERY SAME DAY [facility];  Service:    • ARTHRODESIS Right 5/6/2016    Procedure: ARTHRODESIS INDEX FINGER PROXIMAL INTERPHALANGEAL;  Surgeon: Lobo Rosen M.D.;  Location: SURGERY SAME DAY [facility];  Service:    • FOOT RECONSTRUCTION RHEUMATIC Left 7/29/2015    Procedure: FOOT RECONSTRUCTION RHEUMATIC;  Surgeon: Heriberto Alves M.D.;  Location: [facility];  Service:    • TOE FUSION Right 5/27/2015    Procedure: TOE FUSION 1ST METATARSALPHALANGEAL JOINT;  Surgeon: Heriberto Alves M.D.;  Location: [facility];  Service:    • BONE SPUR EXCISION  5/27/2015    Procedure: BONE SPUR EXCISION METATARSAL HEAD 2-3;  Surgeon: Heriberto Alves M.D.;  Location: [facility];  Service:    • HAMMERTOE CORRECTION  5/27/2015    Procedure: HAMMERTOE CORRECTION AND SOFT TISSUE RE-ALINGMENT 2-3 ;  Surgeon: Heriberto Alves M.D.;  Location: [facility];  Service:    • DENTAL EXTRACTION(S)  2014    all of upper teeth   • ABDOMINAL ABSCESS DRAINAGE  9/27/2011     Performed by VERO WRIGHT at SURGERY [facility] ORS   • EMANUEL BY LAPAROSCOPY  9/27/2011    Performed by VERO WRIGHT at SURGERY [facility] ORS   • APPENDECTOMY  2011    Found out it had ruptured prior to abcess surgery   • REPEAT C SECTION  2008   • REPEAT C SECTION  2005   • REPEAT C SECTION  1999   • PRIMARY C SECTION  1991   • TONSILLECTOMY  1982   • WRIST EXPLORATION Left 1980's    fractured wrist-no hardware       Family history   No family history on file.      Medications:   Outpatient Medications Marked as Taking for the 3/12/21 encounter (Office Visit) with Jayy Kerr M.D.   Medication Sig Dispense Refill   • [START ON 3/15/2021] oxyCODONE-acetaminophen (PERCOCET) 7.5-325 MG per tablet Take 1 tablet by mouth every four hours as needed for up to 30 days. 150 tablet 0   • [START ON 4/11/2021] oxyCODONE-acetaminophen (PERCOCET) 7.5-325 MG per tablet Take 1 tablet by mouth every four hours as needed for up to 30 days. 150 tablet 0   • oxyCODONE-acetaminophen (PERCOCET) 7.5-325 MG per tablet Take 1 tablet by mouth every four hours as needed for up to 30 days. 150 tablet 0   • ENBREL 50 MG/ML Solution Prefilled Syringe INJECT 50 MG ONCE WEEKLY UNDERNEATH THE SKIN  FOR 28 DAYS     • Naloxone (NARCAN) 4 MG/0.1ML Liquid One spray in one nostril for overdose and call 911. 1 Package 0   • ondansetron (ZOFRAN ODT) 8 MG TABLET DISPERSIBLE Take 8 mg by mouth every 8 hours as needed.     • QUEtiapine (SEROQUEL) 100 MG Tab Take 100 mg by mouth every evening.     • PARoxetine (PAXIL) 30 MG Tab Take 60 mg by mouth every evening.     • albuterol (VENTOLIN OR PROVENTIL) 108 (90 BASE) MCG/ACT AERS inhalation aerosol Inhale 2 Puffs by mouth every four hours as needed for Shortness of Breath.         Allergies:   Allergies   Allergen Reactions   • Penicillins Anaphylaxis and Hives     Tolerates cephalosporins; reports throat swelling with PCN   • Aripiprazole Nausea     Spasms, shaking     • Nitrous Oxide  Vomiting       Social Hx:   Social History     Socioeconomic History   • Marital status:      Spouse name: Not on file   • Number of children: Not on file   • Years of education: Not on file   • Highest education level: Not on file   Occupational History   • Not on file   Tobacco Use   • Smoking status: Current Every Day Smoker     Packs/day: 1.00     Years: 30.00     Pack years: 30.00     Types: Cigarettes   • Smokeless tobacco: Never Used   • Tobacco comment: 4/24/20-now smoking approx 1/2 PPD.   Substance and Sexual Activity   • Alcohol use: Never   • Drug use: Never   • Sexual activity: Not on file   Other Topics Concern   •  Service No   • Blood Transfusions Yes   • Caffeine Concern No   • Occupational Exposure No   • Hobby Hazards No   • Sleep Concern No   • Stress Concern Yes   • Weight Concern No   • Special Diet No   • Back Care No   • Exercise Yes   • Bike Helmet Yes   • Seat Belt Yes   • Self-Exams Yes   Social History Narrative    ** Merged History Encounter **          Social Determinants of Health     Financial Resource Strain:    • Difficulty of Paying Living Expenses:    Food Insecurity:    • Worried About Running Out of Food in the Last Year:    • Ran Out of Food in the Last Year:    Transportation Needs:    • Lack of Transportation (Medical):    • Lack of Transportation (Non-Medical):    Physical Activity:    • Days of Exercise per Week:    • Minutes of Exercise per Session:    Stress:    • Feeling of Stress :    Social Connections:    • Frequency of Communication with Friends and Family:    • Frequency of Social Gatherings with Friends and Family:    • Attends Orthodoxy Services:    • Active Member of Clubs or Organizations:    • Attends Club or Organization Meetings:    • Marital Status:    Intimate Partner Violence:    • Fear of Current or Ex-Partner:    • Emotionally Abused:    • Physically Abused:    • Sexually Abused:          EXAMINATION     Physical Exam:   Vitals: /80  "(BP Location: Right arm, Patient Position: Sitting, BP Cuff Size: Adult)   Pulse (!) 122   Temp (!) 35.6 °C (96 °F) (Temporal)   Resp 16   Ht 1.6 m (5' 3\")   Wt 49 kg (108 lb 0.4 oz)   SpO2 96%     Constitutional:   Body Habitus: Body mass index is 19.14 kg/m².  Cooperation: Fully cooperates with exam  Appearance: Appears pale, She is wearing a mask  Respiratory-  breathing comfortable on room air, no audible wheezing  Cardiovascular- no lower extremity edema is observed.  Heart rate is regular, but tachycardic  Psychiatric- alert and oriented ×3. Normal affect.     Musculoskeletal:  Right shoulder with crepitus and pain with ROM less than 90 degrees before pain worsens.      Partial hand amputation on the right at the MCPs; ulnar deviation on the left with MCP subluxation, mild atrophy of the hand intrinsics with wasting of the thenar eminence.     Gait is steady without assist device.      MEDICAL DECISION MAKING    DATA    Labs: UDS 10/30/2018 consistent with medications.  Oxycodone and metabolites without other substances   UDS 04/19/2019 consistent with medications. Oxycodone and metabolites without other substances   UDS 07/19/2019 consistent with medications.  Oxycodone and metabolites without other substances   UDS 11/22/2019 consistent with medications.  Oxycodone and metabolites without other substances   UDS 02/20/2020 absent for Oxycodone and metabolites without other substances  UDS 06/10/2020 positive for oxycodone and metabolites without other substances  UDS 08/18/2020 positive for oxycodone and metabolites, positive for EtG without EtS    Imaging:    Films reviewed.  These are my reads:    Xray right shoulder 08/20/2020  There is note of severe degenerative change of the glenohumeral joint.  No fracture.  Erosive changes, consistent with known history of RA    MRI cervical spine 07/31/2018:    There is is note of motion at the atlantodental level with 5mm atlantodental inverval in flexion that " reduces to 1-2 mm in extension.  Mild retrolisthesis of C4 on C5 and anterolisthesis of C5- on C6.  Disc extrusion at C6-7 with mild central canal stenosis    Xray left shoulder 2/5/2018 Humeral head is elevated.  Glenohumeral joint arthritis.    Reports reviewed:    Xray right shoulder 08/20/2020 [facility]  Impression  Extensive erosive changes of the humeral head and degenerative changes of the glenohumeral joint.  Findings are concerning for inflammatory arthropathy such as rheumatoid arthritis.      Xray cervical spine 11/14/2018  1. No acute fracture  2. Persistent motion at the C1-2 joint as before, suggesting atlantoaxial instability  3. Minimal instability noted at C5-6 level as described.    MRI cervical spine 07/31/2018  1. Widening of the atlantodental interal in neutral and flexion positions with a 5mm space which reduces to 1-2 mm in extension  2. 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 Rheumatoid arthritis involving multiple sites with positive rheumatoid factor (HCC)  M05.79   2. Arthritis of glenohumeral joint  M19.019   3. 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 Depression, unspecified depression type  F32.9   5. Hand deformity, acquired, left  M21.942   6. Chronic, continuous use of opioids  F11.90         ASSESSMENT and PLAN:     Mary Izaguirrechandrika Martinez 50 y.o. female with rheumatoid arthritis    Mary was seen today for follow-up.    Orders and management for this visit:    Orders Placed This Encounter   • oxyCODONE-acetaminophen (PERCOCET) 7.5-325 MG per tablet   • oxyCODONE-acetaminophen (PERCOCET) 7.5-325 MG per tablet   • Consent for Opiate Prescription   • Controlled Substance Treatment Agreement     1. I am concerned that she is having accelerated weight loss  and worsening heartburn.  She needs relatively urgent follow-up with both her PCP, Dr. Christopher and GI specialist, Dr. Graf.  We have discussed this or she will need to go to the ED and possibly get admitted if she is unable to maintain her hydration and weight at home.    2. Discussed percocet 7.5/325#150 per month.   reviewed.  Plan to continue current dose.  Script given for this month and next month.  Consents on file.  3. Hold orthopedic follow-up for now.  4. Continue Embrel with Dr. Dela Cruz  5. Continue psychology and psychiatry.  She denies suicidality.   6. Continue diclofenac gel for shoulders.  Plan to follow-up with Ortho and Neurosurgery in the future, particularly if paresthesias worsen.    In prescribing controlled substances to this patient, I certify that I have obtained and reviewed the medical history of Mary Martinez. I have also made a good aristides effort to obtain applicable records from other providers who have treated the patient and records did not demonstrate any increased risk of substance abuse that would prevent me from prescribing controlled substances.     I have conducted a physical exam and documented it. I have reviewed Ms. Martinez’s prescription history as maintained by the [place] Prescription Monitoring Program.   ORT 4, indicates moderate risk    I have assessed the patient’s risk for abuse, dependency, and addiction using the validated Opioid Risk Tool available at https://www.mdcalc.com/togbid-bfww-ptfr-ort-narcotic-abuse.     Given the above, I believe the benefits of controlled substance therapy outweigh the risks. The reasons for prescribing controlled substances include non-narcotic, oral analgesic alternatives have been inadequate for pain control and in my professional opinion, controlled substances are the only reasonable choice for this patient because her pain was note adequately controlled with alternatives and she has chronic progressive disease.  Accordingly, I have discussed the risk and benefits, treatment plan, and alternative therapies with the patient.       Follow up: 2 months      Please note that this dictation was created using voice recognition software. I have made every reasonable attempt to correct obvious errors but there may be errors of grammar and content that I may have overlooked prior to finalization of this note.      Jayy Kerr MD  Interventional Spine and Sports Physiatry  Physical Medicine and Rehabilitation  [facility]      CC: Dr. Fidencio Christopher; Dr. Lobo Graf

## 2021-03-12 NOTE — Clinical Note
Dr. Gurrola,  I have seen Mary.  She has lost nearly 15lb in the last month and tells me she has not been able to eat for 2 weeks.  I do hope your office can contact her and get her into the office, I have urged her to also contact you, before she further declines.    She's also having access issues to her PCP, also Cc'd.  I'm afraid that as her pain specialist, I am limited here.      Thank you,    Jayy Kerr MD

## 2021-04-06 ENCOUNTER — HOSPITAL ENCOUNTER (EMERGENCY)
Dept: HOSPITAL 8 - ED | Age: 51
Discharge: HOME | End: 2021-04-06
Payer: MEDICAID

## 2021-04-06 VITALS — DIASTOLIC BLOOD PRESSURE: 65 MMHG | SYSTOLIC BLOOD PRESSURE: 99 MMHG

## 2021-04-06 VITALS — BODY MASS INDEX: 18.09 KG/M2 | WEIGHT: 102.07 LBS | HEIGHT: 63 IN

## 2021-04-06 DIAGNOSIS — M06.9: ICD-10-CM

## 2021-04-06 DIAGNOSIS — R10.84: Primary | ICD-10-CM

## 2021-04-06 DIAGNOSIS — Z90.49: ICD-10-CM

## 2021-04-06 DIAGNOSIS — E87.6: ICD-10-CM

## 2021-04-06 DIAGNOSIS — R11.2: ICD-10-CM

## 2021-04-06 LAB
ALBUMIN SERPL-MCNC: 3.4 G/DL (ref 3.4–5)
ALP SERPL-CCNC: 153 U/L (ref 45–117)
ALT SERPL-CCNC: 10 U/L (ref 12–78)
ANION GAP SERPL CALC-SCNC: 11 MMOL/L (ref 5–15)
ANION GAP SERPL CALC-SCNC: 4 MMOL/L (ref 5–15)
BASOPHILS # BLD AUTO: 0.1 X10^3/UL (ref 0–0.1)
BASOPHILS NFR BLD AUTO: 1 % (ref 0–1)
BILIRUB SERPL-MCNC: 0.3 MG/DL (ref 0.2–1)
CALCIUM SERPL-MCNC: 9.3 MG/DL (ref 8.5–10.1)
CHLORIDE SERPL-SCNC: 109 MMOL/L (ref 98–107)
CHLORIDE SERPL-SCNC: 115 MMOL/L (ref 98–107)
CREAT SERPL-MCNC: 0.84 MG/DL (ref 0.55–1.02)
EOSINOPHIL # BLD AUTO: 0.1 X10^3/UL (ref 0–0.4)
EOSINOPHIL NFR BLD AUTO: 1 % (ref 1–7)
ERYTHROCYTE [DISTWIDTH] IN BLOOD BY AUTOMATED COUNT: 14.9 % (ref 9.6–15.2)
LYMPHOCYTES # BLD AUTO: 1.9 X10^3/UL (ref 1–3.4)
LYMPHOCYTES NFR BLD AUTO: 25 % (ref 22–44)
MCH RBC QN AUTO: 32.4 PG (ref 27–34.8)
MCHC RBC AUTO-ENTMCNC: 33.4 G/DL (ref 32.4–35.8)
MICROSCOPIC: (no result)
MONOCYTES # BLD AUTO: 0.5 X10^3/UL (ref 0.2–0.8)
MONOCYTES NFR BLD AUTO: 6 % (ref 2–9)
NEUTROPHILS # BLD AUTO: 5.3 X10^3/UL (ref 1.8–6.8)
NEUTROPHILS NFR BLD AUTO: 67 % (ref 42–75)
PLATELET # BLD AUTO: 430 X10^3/UL (ref 130–400)
PMV BLD AUTO: 8.9 FL (ref 7.4–10.4)
PROT SERPL-MCNC: 8.8 G/DL (ref 6.4–8.2)
RBC # BLD AUTO: 4.31 X10^6/UL (ref 3.82–5.3)

## 2021-04-06 PROCEDURE — 81003 URINALYSIS AUTO W/O SCOPE: CPT

## 2021-04-06 PROCEDURE — 96366 THER/PROPH/DIAG IV INF ADDON: CPT

## 2021-04-06 PROCEDURE — 96361 HYDRATE IV INFUSION ADD-ON: CPT

## 2021-04-06 PROCEDURE — 36415 COLL VENOUS BLD VENIPUNCTURE: CPT

## 2021-04-06 PROCEDURE — 93005 ELECTROCARDIOGRAM TRACING: CPT

## 2021-04-06 PROCEDURE — 99285 EMERGENCY DEPT VISIT HI MDM: CPT

## 2021-04-06 PROCEDURE — 85025 COMPLETE CBC W/AUTO DIFF WBC: CPT

## 2021-04-06 PROCEDURE — 83690 ASSAY OF LIPASE: CPT

## 2021-04-06 PROCEDURE — 96376 TX/PRO/DX INJ SAME DRUG ADON: CPT

## 2021-04-06 PROCEDURE — 83605 ASSAY OF LACTIC ACID: CPT

## 2021-04-06 PROCEDURE — 80051 ELECTROLYTE PANEL: CPT

## 2021-04-06 PROCEDURE — 74177 CT ABD & PELVIS W/CONTRAST: CPT

## 2021-04-06 PROCEDURE — 74022 RADEX COMPL AQT ABD SERIES: CPT

## 2021-04-06 PROCEDURE — 96365 THER/PROPH/DIAG IV INF INIT: CPT

## 2021-04-06 PROCEDURE — 80053 COMPREHEN METABOLIC PANEL: CPT

## 2021-04-06 PROCEDURE — 96375 TX/PRO/DX INJ NEW DRUG ADDON: CPT

## 2021-04-06 NOTE — NUR
pt reports some relief of pain and nausea after medications.  pt resting in 
position of comfort with IV infusing

## 2021-04-06 NOTE — NUR
assumed care of pt.  pt here fr diffuse abd pain x2 days.  pt has long hx of 
abd sugeries,  pt reports that she has had no elief with her narcotic pain meds 
PTA



A&O x4.  anxious and restless.   no resp. distress



pt mother at bedside



Dr. Lawrence at bedside for eval

## 2021-04-06 NOTE — NUR
pt has been medicated per order.  pt advised not to drive after pian meds.  pt 
verbalized understanding.  pt mother at bedside to drive her home



pt positioning for comfort.  updated on POC

## 2021-04-06 NOTE — NUR
LUNCH BREAK NOTE: 3 P'S ADDRESSED. PT UPDATED ON POC AND WILL START POTASSIUM 
DRIP FOR 2.9 POTASSIUM.

## 2021-05-05 ENCOUNTER — HOSPITAL ENCOUNTER (EMERGENCY)
Dept: HOSPITAL 8 - ED | Age: 51
Discharge: HOME | End: 2021-05-05
Payer: MEDICAID

## 2021-05-05 VITALS — SYSTOLIC BLOOD PRESSURE: 116 MMHG | DIASTOLIC BLOOD PRESSURE: 69 MMHG

## 2021-05-05 VITALS — BODY MASS INDEX: 18.28 KG/M2 | HEIGHT: 63 IN | WEIGHT: 103.18 LBS

## 2021-05-05 DIAGNOSIS — Z90.49: ICD-10-CM

## 2021-05-05 DIAGNOSIS — R10.84: Primary | ICD-10-CM

## 2021-05-05 DIAGNOSIS — E87.6: ICD-10-CM

## 2021-05-05 DIAGNOSIS — Z90.89: ICD-10-CM

## 2021-05-05 DIAGNOSIS — F17.200: ICD-10-CM

## 2021-05-05 DIAGNOSIS — M19.90: ICD-10-CM

## 2021-05-05 DIAGNOSIS — R11.2: ICD-10-CM

## 2021-05-05 LAB
ALBUMIN SERPL-MCNC: 2.7 G/DL (ref 3.4–5)
ALP SERPL-CCNC: 125 U/L (ref 45–117)
ALT SERPL-CCNC: 11 U/L (ref 12–78)
ANION GAP SERPL CALC-SCNC: 6 MMOL/L (ref 5–15)
BASOPHILS # BLD AUTO: 0.1 X10^3/UL (ref 0–0.1)
BASOPHILS NFR BLD AUTO: 2 % (ref 0–1)
BILIRUB SERPL-MCNC: 0.1 MG/DL (ref 0.2–1)
CALCIUM SERPL-MCNC: 8.2 MG/DL (ref 8.5–10.1)
CHLORIDE SERPL-SCNC: 113 MMOL/L (ref 98–107)
CREAT SERPL-MCNC: 0.77 MG/DL (ref 0.55–1.02)
EOSINOPHIL # BLD AUTO: 0.2 X10^3/UL (ref 0–0.4)
EOSINOPHIL NFR BLD AUTO: 3 % (ref 1–7)
ERYTHROCYTE [DISTWIDTH] IN BLOOD BY AUTOMATED COUNT: 15.2 % (ref 9.6–15.2)
LYMPHOCYTES # BLD AUTO: 2.5 X10^3/UL (ref 1–3.4)
LYMPHOCYTES NFR BLD AUTO: 29 % (ref 22–44)
MCH RBC QN AUTO: 31.4 PG (ref 27–34.8)
MCHC RBC AUTO-ENTMCNC: 32.7 G/DL (ref 32.4–35.8)
MD: NO
MICROSCOPIC: (no result)
MONOCYTES # BLD AUTO: 0.6 X10^3/UL (ref 0.2–0.8)
MONOCYTES NFR BLD AUTO: 7 % (ref 2–9)
NEUTROPHILS # BLD AUTO: 5.1 X10^3/UL (ref 1.8–6.8)
NEUTROPHILS NFR BLD AUTO: 60 % (ref 42–75)
PLATELET # BLD AUTO: 352 X10^3/UL (ref 130–400)
PMV BLD AUTO: 9.1 FL (ref 7.4–10.4)
PROT SERPL-MCNC: 7.2 G/DL (ref 6.4–8.2)
RBC # BLD AUTO: 3.71 X10^6/UL (ref 3.82–5.3)

## 2021-05-05 PROCEDURE — 85025 COMPLETE CBC W/AUTO DIFF WBC: CPT

## 2021-05-05 PROCEDURE — 80053 COMPREHEN METABOLIC PANEL: CPT

## 2021-05-05 PROCEDURE — 81003 URINALYSIS AUTO W/O SCOPE: CPT

## 2021-05-05 PROCEDURE — 36415 COLL VENOUS BLD VENIPUNCTURE: CPT

## 2021-05-05 PROCEDURE — 84703 CHORIONIC GONADOTROPIN ASSAY: CPT

## 2021-05-05 PROCEDURE — 96361 HYDRATE IV INFUSION ADD-ON: CPT

## 2021-05-05 PROCEDURE — 83690 ASSAY OF LIPASE: CPT

## 2021-05-05 PROCEDURE — 74176 CT ABD & PELVIS W/O CONTRAST: CPT

## 2021-05-05 PROCEDURE — 99284 EMERGENCY DEPT VISIT MOD MDM: CPT

## 2021-05-05 PROCEDURE — 96375 TX/PRO/DX INJ NEW DRUG ADDON: CPT

## 2021-05-05 PROCEDURE — 96374 THER/PROPH/DIAG INJ IV PUSH: CPT

## 2021-05-05 NOTE — NUR
PER CT TECH, IV NOT WORKING FOR CONTRAST.  ERP NOTIFIED, CHANGE OF ORDER TO 
NONCONTRAST CT ABDOMEN.

## 2021-05-05 NOTE — NUR
REPORT FROM CARMEN, ASSUME CARE OF PT AT THIS TIME.  MEDS GIVEN, NS BOLUS 
INFUSING.  FRIEND AT BS.  CALL LIGHT WITHIN REACH.

## 2021-05-06 ENCOUNTER — APPOINTMENT (OUTPATIENT)
Dept: RADIOLOGY | Facility: MEDICAL CENTER | Age: 51
End: 2021-05-06
Attending: STUDENT IN AN ORGANIZED HEALTH CARE EDUCATION/TRAINING PROGRAM
Payer: MEDICAID

## 2021-05-06 ENCOUNTER — HOSPITAL ENCOUNTER (OUTPATIENT)
Facility: MEDICAL CENTER | Age: 51
End: 2021-05-10
Attending: EMERGENCY MEDICINE | Admitting: STUDENT IN AN ORGANIZED HEALTH CARE EDUCATION/TRAINING PROGRAM
Payer: MEDICAID

## 2021-05-06 ENCOUNTER — OFFICE VISIT (OUTPATIENT)
Dept: PHYSICAL MEDICINE AND REHAB | Facility: MEDICAL CENTER | Age: 51
End: 2021-05-06
Payer: MEDICAID

## 2021-05-06 VITALS
SYSTOLIC BLOOD PRESSURE: 110 MMHG | BODY MASS INDEX: 18.36 KG/M2 | HEIGHT: 63 IN | HEART RATE: 100 BPM | WEIGHT: 103.62 LBS | DIASTOLIC BLOOD PRESSURE: 74 MMHG | TEMPERATURE: 99.3 F | OXYGEN SATURATION: 99 %

## 2021-05-06 DIAGNOSIS — R63.4 UNINTENTIONAL WEIGHT LOSS: ICD-10-CM

## 2021-05-06 DIAGNOSIS — R10.9 ABDOMINAL PAIN, UNSPECIFIED ABDOMINAL LOCATION: ICD-10-CM

## 2021-05-06 DIAGNOSIS — R63.4 WEIGHT LOSS: ICD-10-CM

## 2021-05-06 DIAGNOSIS — M19.019 ARTHRITIS OF GLENOHUMERAL JOINT: ICD-10-CM

## 2021-05-06 DIAGNOSIS — M53.2X1 ATLANTOAXIAL INSTABILITY: ICD-10-CM

## 2021-05-06 DIAGNOSIS — F11.90 CHRONIC, CONTINUOUS USE OF OPIOIDS: ICD-10-CM

## 2021-05-06 DIAGNOSIS — M05.79 RHEUMATOID ARTHRITIS INVOLVING MULTIPLE SITES WITH POSITIVE RHEUMATOID FACTOR (HCC): ICD-10-CM

## 2021-05-06 DIAGNOSIS — R11.2 NAUSEA AND VOMITING, INTRACTABILITY OF VOMITING NOT SPECIFIED, UNSPECIFIED VOMITING TYPE: ICD-10-CM

## 2021-05-06 DIAGNOSIS — R11.0 NAUSEA: ICD-10-CM

## 2021-05-06 DIAGNOSIS — R59.9 ENLARGED LYMPH NODES: ICD-10-CM

## 2021-05-06 LAB
ALBUMIN SERPL BCP-MCNC: 3.4 G/DL (ref 3.2–4.9)
ALBUMIN/GLOB SERPL: 0.9 G/DL
ALP SERPL-CCNC: 131 U/L (ref 30–99)
ALT SERPL-CCNC: <5 U/L (ref 2–50)
ANION GAP SERPL CALC-SCNC: 13 MMOL/L (ref 7–16)
APPEARANCE UR: CLEAR
AST SERPL-CCNC: 9 U/L (ref 12–45)
BASOPHILS # BLD AUTO: 0.9 % (ref 0–1.8)
BASOPHILS # BLD: 0.09 K/UL (ref 0–0.12)
BILIRUB SERPL-MCNC: 0.2 MG/DL (ref 0.1–1.5)
BILIRUB UR QL STRIP.AUTO: NEGATIVE
BUN SERPL-MCNC: 10 MG/DL (ref 8–22)
CALCIUM SERPL-MCNC: 8.8 MG/DL (ref 8.4–10.2)
CHLORIDE SERPL-SCNC: 107 MMOL/L (ref 96–112)
CO2 SERPL-SCNC: 15 MMOL/L (ref 20–33)
COLOR UR: YELLOW
CREAT SERPL-MCNC: 0.7 MG/DL (ref 0.5–1.4)
EOSINOPHIL # BLD AUTO: 0.26 K/UL (ref 0–0.51)
EOSINOPHIL NFR BLD: 2.5 % (ref 0–6.9)
ERYTHROCYTE [DISTWIDTH] IN BLOOD BY AUTOMATED COUNT: 55.1 FL (ref 35.9–50)
GLOBULIN SER CALC-MCNC: 4 G/DL (ref 1.9–3.5)
GLUCOSE SERPL-MCNC: 99 MG/DL (ref 65–99)
GLUCOSE UR STRIP.AUTO-MCNC: NEGATIVE MG/DL
HCT VFR BLD AUTO: 37.7 % (ref 37–47)
HGB BLD-MCNC: 12.1 G/DL (ref 12–16)
IMM GRANULOCYTES # BLD AUTO: 0.06 K/UL (ref 0–0.11)
IMM GRANULOCYTES NFR BLD AUTO: 0.6 % (ref 0–0.9)
KETONES UR STRIP.AUTO-MCNC: NEGATIVE MG/DL
LEUKOCYTE ESTERASE UR QL STRIP.AUTO: NEGATIVE
LIPASE SERPL-CCNC: 47 U/L (ref 7–58)
LYMPHOCYTES # BLD AUTO: 2.41 K/UL (ref 1–4.8)
LYMPHOCYTES NFR BLD: 23.4 % (ref 22–41)
MAGNESIUM SERPL-MCNC: 2.2 MG/DL (ref 1.5–2.5)
MCH RBC QN AUTO: 32.3 PG (ref 27–33)
MCHC RBC AUTO-ENTMCNC: 32.1 G/DL (ref 33.6–35)
MCV RBC AUTO: 100.5 FL (ref 81.4–97.8)
MICRO URNS: NORMAL
MONOCYTES # BLD AUTO: 0.75 K/UL (ref 0–0.85)
MONOCYTES NFR BLD AUTO: 7.3 % (ref 0–13.4)
NEUTROPHILS # BLD AUTO: 6.74 K/UL (ref 2–7.15)
NEUTROPHILS NFR BLD: 65.3 % (ref 44–72)
NITRITE UR QL STRIP.AUTO: NEGATIVE
NRBC # BLD AUTO: 0 K/UL
NRBC BLD-RTO: 0 /100 WBC
PH UR STRIP.AUTO: 6.5 [PH] (ref 5–8)
PHOSPHATE SERPL-MCNC: 3 MG/DL (ref 2.5–4.5)
PLATELET # BLD AUTO: 309 K/UL (ref 164–446)
PMV BLD AUTO: 11.4 FL (ref 9–12.9)
POTASSIUM SERPL-SCNC: 4.1 MMOL/L (ref 3.6–5.5)
PROT SERPL-MCNC: 7.4 G/DL (ref 6–8.2)
PROT UR QL STRIP: NEGATIVE MG/DL
RBC # BLD AUTO: 3.75 M/UL (ref 4.2–5.4)
RBC UR QL AUTO: NEGATIVE
SODIUM SERPL-SCNC: 135 MMOL/L (ref 135–145)
SP GR UR STRIP.AUTO: 1.01
WBC # BLD AUTO: 10.3 K/UL (ref 4.8–10.8)

## 2021-05-06 PROCEDURE — 74177 CT ABD & PELVIS W/CONTRAST: CPT

## 2021-05-06 PROCEDURE — 84100 ASSAY OF PHOSPHORUS: CPT

## 2021-05-06 PROCEDURE — 96374 THER/PROPH/DIAG INJ IV PUSH: CPT | Mod: XU

## 2021-05-06 PROCEDURE — 700102 HCHG RX REV CODE 250 W/ 637 OVERRIDE(OP): Performed by: EMERGENCY MEDICINE

## 2021-05-06 PROCEDURE — 700117 HCHG RX CONTRAST REV CODE 255: Performed by: STUDENT IN AN ORGANIZED HEALTH CARE EDUCATION/TRAINING PROGRAM

## 2021-05-06 PROCEDURE — 99219 PR INITIAL OBSERVATION CARE,LEVL II: CPT | Performed by: STUDENT IN AN ORGANIZED HEALTH CARE EDUCATION/TRAINING PROGRAM

## 2021-05-06 PROCEDURE — A9270 NON-COVERED ITEM OR SERVICE: HCPCS | Performed by: STUDENT IN AN ORGANIZED HEALTH CARE EDUCATION/TRAINING PROGRAM

## 2021-05-06 PROCEDURE — G0378 HOSPITAL OBSERVATION PER HR: HCPCS

## 2021-05-06 PROCEDURE — 99285 EMERGENCY DEPT VISIT HI MDM: CPT

## 2021-05-06 PROCEDURE — 36415 COLL VENOUS BLD VENIPUNCTURE: CPT

## 2021-05-06 PROCEDURE — U0005 INFEC AGEN DETEC AMPLI PROBE: HCPCS

## 2021-05-06 PROCEDURE — 81003 URINALYSIS AUTO W/O SCOPE: CPT

## 2021-05-06 PROCEDURE — 96375 TX/PRO/DX INJ NEW DRUG ADDON: CPT

## 2021-05-06 PROCEDURE — 80053 COMPREHEN METABOLIC PANEL: CPT

## 2021-05-06 PROCEDURE — 99214 OFFICE O/P EST MOD 30 MIN: CPT | Performed by: PHYSICAL MEDICINE & REHABILITATION

## 2021-05-06 PROCEDURE — 83735 ASSAY OF MAGNESIUM: CPT

## 2021-05-06 PROCEDURE — 700105 HCHG RX REV CODE 258: Performed by: EMERGENCY MEDICINE

## 2021-05-06 PROCEDURE — 700102 HCHG RX REV CODE 250 W/ 637 OVERRIDE(OP): Performed by: STUDENT IN AN ORGANIZED HEALTH CARE EDUCATION/TRAINING PROGRAM

## 2021-05-06 PROCEDURE — 700111 HCHG RX REV CODE 636 W/ 250 OVERRIDE (IP): Performed by: STUDENT IN AN ORGANIZED HEALTH CARE EDUCATION/TRAINING PROGRAM

## 2021-05-06 PROCEDURE — 700101 HCHG RX REV CODE 250: Performed by: STUDENT IN AN ORGANIZED HEALTH CARE EDUCATION/TRAINING PROGRAM

## 2021-05-06 PROCEDURE — U0003 INFECTIOUS AGENT DETECTION BY NUCLEIC ACID (DNA OR RNA); SEVERE ACUTE RESPIRATORY SYNDROME CORONAVIRUS 2 (SARS-COV-2) (CORONAVIRUS DISEASE [COVID-19]), AMPLIFIED PROBE TECHNIQUE, MAKING USE OF HIGH THROUGHPUT TECHNOLOGIES AS DESCRIBED BY CMS-2020-01-R: HCPCS

## 2021-05-06 PROCEDURE — 85025 COMPLETE CBC W/AUTO DIFF WBC: CPT

## 2021-05-06 PROCEDURE — A9270 NON-COVERED ITEM OR SERVICE: HCPCS | Performed by: EMERGENCY MEDICINE

## 2021-05-06 PROCEDURE — 700111 HCHG RX REV CODE 636 W/ 250 OVERRIDE (IP): Performed by: EMERGENCY MEDICINE

## 2021-05-06 PROCEDURE — 83690 ASSAY OF LIPASE: CPT

## 2021-05-06 RX ORDER — OXYCODONE AND ACETAMINOPHEN 7.5; 325 MG/1; MG/1
1 TABLET ORAL EVERY 4 HOURS PRN
Qty: 150 TABLET | Refills: 0 | Status: SHIPPED | OUTPATIENT
Start: 2021-05-11 | End: 2021-06-08 | Stop reason: SDUPTHER

## 2021-05-06 RX ORDER — PROMETHAZINE HYDROCHLORIDE 25 MG/1
12.5-25 TABLET ORAL EVERY 4 HOURS PRN
Status: DISCONTINUED | OUTPATIENT
Start: 2021-05-06 | End: 2021-05-10 | Stop reason: HOSPADM

## 2021-05-06 RX ORDER — QUETIAPINE FUMARATE 25 MG/1
25 TABLET, FILM COATED ORAL 2 TIMES DAILY PRN
Status: DISCONTINUED | OUTPATIENT
Start: 2021-05-06 | End: 2021-05-10 | Stop reason: HOSPADM

## 2021-05-06 RX ORDER — SODIUM CHLORIDE AND POTASSIUM CHLORIDE 150; 900 MG/100ML; MG/100ML
INJECTION, SOLUTION INTRAVENOUS CONTINUOUS
Status: DISCONTINUED | OUTPATIENT
Start: 2021-05-06 | End: 2021-05-07

## 2021-05-06 RX ORDER — PAROXETINE HYDROCHLORIDE 20 MG/1
60 TABLET, FILM COATED ORAL EVERY EVENING
Status: DISCONTINUED | OUTPATIENT
Start: 2021-05-06 | End: 2021-05-10 | Stop reason: HOSPADM

## 2021-05-06 RX ORDER — ONDANSETRON 2 MG/ML
4 INJECTION INTRAMUSCULAR; INTRAVENOUS ONCE
Status: COMPLETED | OUTPATIENT
Start: 2021-05-06 | End: 2021-05-06

## 2021-05-06 RX ORDER — PROCHLORPERAZINE EDISYLATE 5 MG/ML
5-10 INJECTION INTRAMUSCULAR; INTRAVENOUS EVERY 4 HOURS PRN
Status: DISCONTINUED | OUTPATIENT
Start: 2021-05-06 | End: 2021-05-10 | Stop reason: HOSPADM

## 2021-05-06 RX ORDER — QUETIAPINE FUMARATE 25 MG/1
25 TABLET, FILM COATED ORAL 2 TIMES DAILY PRN
COMMUNITY
Start: 2021-04-08 | End: 2023-01-19

## 2021-05-06 RX ORDER — ONDANSETRON 4 MG/1
4 TABLET, ORALLY DISINTEGRATING ORAL EVERY 4 HOURS PRN
Status: DISCONTINUED | OUTPATIENT
Start: 2021-05-06 | End: 2021-05-10 | Stop reason: HOSPADM

## 2021-05-06 RX ORDER — HYDROMORPHONE HYDROCHLORIDE 1 MG/ML
0.5 INJECTION, SOLUTION INTRAMUSCULAR; INTRAVENOUS; SUBCUTANEOUS ONCE
Status: COMPLETED | OUTPATIENT
Start: 2021-05-06 | End: 2021-05-06

## 2021-05-06 RX ORDER — FAMOTIDINE 20 MG/1
20 TABLET, FILM COATED ORAL 2 TIMES DAILY
Status: DISCONTINUED | OUTPATIENT
Start: 2021-05-06 | End: 2021-05-10 | Stop reason: HOSPADM

## 2021-05-06 RX ORDER — QUETIAPINE FUMARATE 100 MG/1
100 TABLET, FILM COATED ORAL EVERY EVENING
Status: DISCONTINUED | OUTPATIENT
Start: 2021-05-06 | End: 2021-05-10 | Stop reason: HOSPADM

## 2021-05-06 RX ORDER — SODIUM CHLORIDE 9 MG/ML
1000 INJECTION, SOLUTION INTRAVENOUS ONCE
Status: COMPLETED | OUTPATIENT
Start: 2021-05-06 | End: 2021-05-06

## 2021-05-06 RX ORDER — NICOTINE 21 MG/24HR
1 PATCH, TRANSDERMAL 24 HOURS TRANSDERMAL
Status: DISCONTINUED | OUTPATIENT
Start: 2021-05-06 | End: 2021-05-10 | Stop reason: HOSPADM

## 2021-05-06 RX ORDER — OXYCODONE HYDROCHLORIDE 5 MG/1
5 TABLET ORAL EVERY 4 HOURS PRN
Status: DISCONTINUED | OUTPATIENT
Start: 2021-05-06 | End: 2021-05-10 | Stop reason: HOSPADM

## 2021-05-06 RX ORDER — PROMETHAZINE HYDROCHLORIDE 25 MG/1
12.5-25 SUPPOSITORY RECTAL EVERY 4 HOURS PRN
Status: DISCONTINUED | OUTPATIENT
Start: 2021-05-06 | End: 2021-05-10 | Stop reason: HOSPADM

## 2021-05-06 RX ORDER — ACETAMINOPHEN 325 MG/1
650 TABLET ORAL EVERY 6 HOURS PRN
Status: DISCONTINUED | OUTPATIENT
Start: 2021-05-06 | End: 2021-05-10 | Stop reason: HOSPADM

## 2021-05-06 RX ORDER — ONDANSETRON 2 MG/ML
4 INJECTION INTRAMUSCULAR; INTRAVENOUS EVERY 4 HOURS PRN
Status: DISCONTINUED | OUTPATIENT
Start: 2021-05-06 | End: 2021-05-10 | Stop reason: HOSPADM

## 2021-05-06 RX ORDER — ALBUTEROL SULFATE 90 UG/1
2 AEROSOL, METERED RESPIRATORY (INHALATION)
Status: DISCONTINUED | OUTPATIENT
Start: 2021-05-06 | End: 2021-05-10 | Stop reason: HOSPADM

## 2021-05-06 RX ADMIN — NICOTINE 14 MG: 14 PATCH TRANSDERMAL at 16:56

## 2021-05-06 RX ADMIN — PROCHLORPERAZINE EDISYLATE 5 MG: 5 INJECTION INTRAMUSCULAR; INTRAVENOUS at 18:03

## 2021-05-06 RX ADMIN — HYDROMORPHONE HYDROCHLORIDE 0.5 MG: 1 INJECTION, SOLUTION INTRAMUSCULAR; INTRAVENOUS; SUBCUTANEOUS at 12:34

## 2021-05-06 RX ADMIN — ACETAMINOPHEN 650 MG: 325 TABLET, FILM COATED ORAL at 18:02

## 2021-05-06 RX ADMIN — POTASSIUM CHLORIDE AND SODIUM CHLORIDE: 900; 150 INJECTION, SOLUTION INTRAVENOUS at 16:31

## 2021-05-06 RX ADMIN — POTASSIUM CHLORIDE AND SODIUM CHLORIDE: 900; 150 INJECTION, SOLUTION INTRAVENOUS at 15:26

## 2021-05-06 RX ADMIN — OXYCODONE HYDROCHLORIDE 5 MG: 5 TABLET ORAL at 18:02

## 2021-05-06 RX ADMIN — QUETIAPINE FUMARATE 100 MG: 100 TABLET ORAL at 18:03

## 2021-05-06 RX ADMIN — LIDOCAINE HYDROCHLORIDE 30 ML: 20 SOLUTION OROPHARYNGEAL at 12:33

## 2021-05-06 RX ADMIN — IOHEXOL 80 ML: 350 INJECTION, SOLUTION INTRAVENOUS at 15:07

## 2021-05-06 RX ADMIN — OXYCODONE HYDROCHLORIDE 5 MG: 5 TABLET ORAL at 23:17

## 2021-05-06 RX ADMIN — ONDANSETRON 4 MG: 2 INJECTION INTRAMUSCULAR; INTRAVENOUS at 12:34

## 2021-05-06 RX ADMIN — FAMOTIDINE 20 MG: 20 TABLET ORAL at 18:02

## 2021-05-06 RX ADMIN — SODIUM CHLORIDE 1000 ML: 9 INJECTION, SOLUTION INTRAVENOUS at 12:32

## 2021-05-06 RX ADMIN — PAROXETINE HYDROCHLORIDE 60 MG: 20 TABLET, FILM COATED ORAL at 18:02

## 2021-05-06 ASSESSMENT — LIFESTYLE VARIABLES
CONSUMPTION TOTAL: NEGATIVE
HOW MANY TIMES IN THE PAST YEAR HAVE YOU HAD 5 OR MORE DRINKS IN A DAY: 0
EVER FELT BAD OR GUILTY ABOUT YOUR DRINKING: NO
DO YOU DRINK ALCOHOL: NO
DO YOU DRINK ALCOHOL: NO
ALCOHOL_USE: NO
ON A TYPICAL DAY WHEN YOU DRINK ALCOHOL HOW MANY DRINKS DO YOU HAVE: 0
TOTAL SCORE: 0
TOTAL SCORE: 0
HAVE YOU EVER FELT YOU SHOULD CUT DOWN ON YOUR DRINKING: NO
HAVE PEOPLE ANNOYED YOU BY CRITICIZING YOUR DRINKING: NO
AVERAGE NUMBER OF DAYS PER WEEK YOU HAVE A DRINK CONTAINING ALCOHOL: 0
EVER HAD A DRINK FIRST THING IN THE MORNING TO STEADY YOUR NERVES TO GET RID OF A HANGOVER: NO
TOTAL SCORE: 0

## 2021-05-06 ASSESSMENT — PATIENT HEALTH QUESTIONNAIRE - PHQ9
2. FEELING DOWN, DEPRESSED, IRRITABLE, OR HOPELESS: NOT AT ALL
SUM OF ALL RESPONSES TO PHQ QUESTIONS 1-9: 27
5. POOR APPETITE OR OVEREATING: 3 - NEARLY EVERY DAY
SUM OF ALL RESPONSES TO PHQ9 QUESTIONS 1 AND 2: 0
1. LITTLE INTEREST OR PLEASURE IN DOING THINGS: NOT AT ALL
CLINICAL INTERPRETATION OF PHQ2 SCORE: 6

## 2021-05-06 ASSESSMENT — FIBROSIS 4 INDEX
FIB4 SCORE: 0.67
FIB4 SCORE: 0.98
FIB4 SCORE: 0.98

## 2021-05-06 ASSESSMENT — ENCOUNTER SYMPTOMS
NAUSEA: 1
EYES NEGATIVE: 1
RESPIRATORY NEGATIVE: 1
MUSCULOSKELETAL NEGATIVE: 1
CARDIOVASCULAR NEGATIVE: 1
VOMITING: 1
WEIGHT LOSS: 1
ABDOMINAL PAIN: 1

## 2021-05-06 ASSESSMENT — COGNITIVE AND FUNCTIONAL STATUS - GENERAL
MOBILITY SCORE: 24
SUGGESTED CMS G CODE MODIFIER DAILY ACTIVITY: CH
DAILY ACTIVITIY SCORE: 24
SUGGESTED CMS G CODE MODIFIER MOBILITY: CH

## 2021-05-06 ASSESSMENT — PAIN DESCRIPTION - PAIN TYPE
TYPE: ACUTE PAIN
TYPE: ACUTE PAIN

## 2021-05-06 ASSESSMENT — PAIN SCALES - GENERAL: PAINLEVEL: 10=SEVERE PAIN

## 2021-05-06 NOTE — ASSESSMENT & PLAN NOTE
BMI 18.2.  Patient with chronic abdominal pain and intractable nausea and vomiting.  Dietitian consulted.

## 2021-05-06 NOTE — ED PROVIDER NOTES
ED Provider Note    CHIEF COMPLAINT  Chief Complaint   Patient presents with    Abdominal Pain     epigastric increased since sunday     N/V     increased since sunday    Loss of Appetite     for months       HPI  Mary Martinez is a 51 y.o. female who presents to the emergency department the chief complaint of abdominal pain.  The patient has a long history of chronic abdominal pain but this seems to be getting worse.  Patient is having fairly significant unintentional weight loss.  She has postprandial pain.  Every time she eats or drinks anything she develops fairly severe pain and vomits.  She has gotten to the point where she has a fear of eating or drinking anything due to the fact that she knows it will be followed by pain.  She went to [facility] yesterday.  She had a CT scan which she tells me was unrevealing.  Laboratory tests were apparently unrevealing.  She was discharged home.  She is followed by Dr. Aj Diaz as an outpatient.  Last had an upper endoscopy about a year ago.  This was nondiagnostic at this time.  She does have a prior history of a perforated gastric ulcer.    REVIEW OF SYSTEMS  See HPI for further details. All other systems are negative.     PAST MEDICAL HISTORY  Past Medical History:   Diagnosis Date    Arthritis     Rheumatoid -follows with rheumatologist. Has several fractures due to RA.    ASTHMA     inhalers prn    Bowel habit changes     4/24/20-constipation and diarrhea.    Bronchitis last approx 2018    Chronic pain 04/24/2020    Due to RA    Dental disorder     no teeth upper-does not wear her denture. Broken teeth on bottom.    Heart burn     taking famotidine    Hiatus hernia syndrome     History of pancreatitis     Indigestion     taking famotidine    Pain     everywhere    Pneumonia 10/2019    Psychiatric problem     anxiety and depression    Rheumatoid arthritis(714.0) 2003       FAMILY HISTORY  History reviewed. No pertinent family history.    SOCIAL  HISTORY  Social History     Socioeconomic History    Marital status:      Spouse name: Not on file    Number of children: Not on file    Years of education: Not on file    Highest education level: Not on file   Occupational History    Not on file   Tobacco Use    Smoking status: Current Every Day Smoker     Packs/day: 1.00     Years: 30.00     Pack years: 30.00     Types: Cigarettes    Smokeless tobacco: Never Used    Tobacco comment: 4/24/20-now smoking approx 1/2 PPD.   Substance and Sexual Activity    Alcohol use: Never    Drug use: Never    Sexual activity: Not on file   Other Topics Concern     Service No    Blood Transfusions Yes    Caffeine Concern No    Occupational Exposure No    Hobby Hazards No    Sleep Concern No    Stress Concern Yes    Weight Concern No    Special Diet No    Back Care No    Exercise Yes    Bike Helmet Yes    Seat Belt Yes    Self-Exams Yes   Social History Narrative    ** Merged History Encounter **          Social Determinants of Health     Financial Resource Strain:     Difficulty of Paying Living Expenses:    Food Insecurity:     Worried About Running Out of Food in the Last Year:     Ran Out of Food in the Last Year:    Transportation Needs:     Lack of Transportation (Medical):     Lack of Transportation (Non-Medical):    Physical Activity:     Days of Exercise per Week:     Minutes of Exercise per Session:    Stress:     Feeling of Stress :    Social Connections:     Frequency of Communication with Friends and Family:     Frequency of Social Gatherings with Friends and Family:     Attends [employer] Services:     Active Member of Clubs or Organizations:     Attends Club or Organization Meetings:     Marital Status:    Intimate Partner Violence:     Fear of Current or Ex-Partner:     Emotionally Abused:     Physically Abused:     Sexually Abused:        SURGICAL HISTORY  Past Surgical History:   Procedure Laterality Date    PB ENDOSCOPIC US EXAM, SIMÓN  4/29/2020     Procedure: EGD, WITH ENDOSCOPIC US;  Surgeon: Jorge Brooke M.D.;  Location: [facility];  Service: Gastroenterology    GASTROSCOPY-ENDO  4/29/2020    Procedure: GASTROSCOPY;  Surgeon: Jorge Brooke M.D.;  Location: [facility];  Service: Gastroenterology    EGD WITH ASP/BX  4/29/2020    Procedure: EGD, WITH ASPIRATION BIOPSY - POSS FNA;  Surgeon: Jorge Brooke M.D.;  Location: [facility];  Service: Gastroenterology    PB EXPLORATORY OF ABDOMEN N/A 10/4/2019    Procedure: LAPAROTOMY, EXPLORATORY AND REPAIR OF DUODENAL PERFORATION;  Surgeon: James Dumont M.D.;  Location: [facility];  Service: General    COLONOSCOPY  2018    with upper endoscopy    FINGER ARTHROPLASTY Right 6/5/2017    Procedure: FINGER ARTHROPLASTY - LONG, RING AND SMALL VOLAR PLATE;  Surgeon: Lobo Rosen M.D.;  Location: SURGERY SAME DAY [facility];  Service:     FINGER AMPUTATION  6/5/2017    Procedure: FINGER AMPUTATION - LONG, RING AND SMALL AT THE PROXIMAL INTERPHALANGEAL JOINT;  Surgeon: Lobo Rosen M.D.;  Location: SURGERY SAME DAY [facility];  Service:     FINGER ARTHROPLASTY Right 4/17/2017    Procedure: FINGER ARTHROPLASTY ;  Surgeon: Lobo Rosen M.D.;  Location: SURGERY SAME DAY [facility];  Service:     FINGER AMPUTATION Right 9/14/2016    Procedure: FINGER AMPUTATION INDEX;  Surgeon: Lobo Rosen M.D.;  Location: SURGERY SAME DAY [facility];  Service:     IRRIGATION & DEBRIDEMENT ORTHO Right 9/11/2016    Procedure: IRRIGATION & DEBRIDEMENT ORTHO - right index finger;  Surgeon: Madhu Rosen M.D.;  Location: [facility];  Service:     PIP ARTHRODESIS Right 8/29/2016    Procedure: RE-DO INDEX FINGER PROXIMAL INTERPHALANGEAL ARTHRODESIS;  Surgeon: Lobo Rosen M.D.;  Location: SURGERY SAME DAY [facility];  Service:     BONE GRAFT Right 8/29/2016    Procedure: BONE GRAFT - DISTAL RADIUS  ";  Surgeon: Lobo Rosen M.D.;  Location: SURGERY SAME DAY [facility];  Service:     ARTHRODESIS Right 5/6/2016    Procedure: ARTHRODESIS INDEX FINGER PROXIMAL INTERPHALANGEAL;  Surgeon: Lobo Rosen M.D.;  Location: SURGERY SAME DAY [facility];  Service:     FOOT RECONSTRUCTION RHEUMATIC Left 7/29/2015    Procedure: FOOT RECONSTRUCTION RHEUMATIC;  Surgeon: Heriberto Alves M.D.;  Location: SURGERY [facility];  Service:     TOE FUSION Right 5/27/2015    Procedure: TOE FUSION 1ST METATARSALPHALANGEAL JOINT;  Surgeon: Heriberto Alves M.D.;  Location: SURGERY [facility];  Service:     BONE SPUR EXCISION  5/27/2015    Procedure: BONE SPUR EXCISION METATARSAL HEAD 2-3;  Surgeon: Heriberto Alves M.D.;  Location: SURGERY [facility];  Service:     HAMMERTOE CORRECTION  5/27/2015    Procedure: HAMMERTOE CORRECTION AND SOFT TISSUE RE-ALINGMENT 2-3 ;  Surgeon: Heriberto Alves M.D.;  Location: SURGERY [facility];  Service:     DENTAL EXTRACTION(S)  2014    all of upper teeth    ABDOMINAL ABSCESS DRAINAGE  9/27/2011    Performed by VERO WRIGHT at [facility]    EMANUEL BY LAPAROSCOPY  9/27/2011    Performed by VERO WRIGHT at [facility]    APPENDECTOMY  2011    Found out it had ruptured prior to abcess surgery    REPEAT C SECTION  2008    REPEAT C SECTION  2005    REPEAT C SECTION  1999    PRIMARY C SECTION  1991    TONSILLECTOMY  1982    WRIST EXPLORATION Left 1980's    fractured wrist-no hardware       CURRENT MEDICATIONS  Home Medications    **Home medications have not yet been reviewed for this encounter**         ALLERGIES  Allergies   Allergen Reactions    Penicillins Anaphylaxis and Hives     Tolerates cephalosporins; reports throat swelling with PCN    Aripiprazole Nausea     Spasms, shaking      Nitrous Oxide Vomiting       PHYSICAL EXAM  VITAL SIGNS: /69   Pulse 82   Temp (!) 35.8 °C (96.4 °F) (Temporal)   Resp 18   Ht 1.6 m (5' 3\")   " Wt 46.8 kg (103 lb 2.8 oz)   LMP 09/21/2011   SpO2 97%   Breastfeeding No   BMI 18.28 kg/m²   Constitutional: Well developed, Well nourished, no distress, Non-toxic appearance.   HENT: Normocephalic, Atraumatic, Bilateral external ears normal, Oropharynx moist, No oral exudates, Nose normal.   Eyes: PERRL, EOMI, Conjunctiva normal, No discharge.   Neck: Normal range of motion, No tenderness, Supple, No stridor.   Lymphatic: No lymphadenopathy noted.   Cardiovascular: Normal heart rate, Normal rhythm, No murmurs, No rubs, No gallops.   Thorax & Lungs: Normal breath sounds, No respiratory distress, No wheezing, No chest tenderness.   Abdomen: Thin, well-healed exploratory laparotomy scar.  Mild tenderness in the epigastrium without any rebound tenderness or guarding.  Active bowel sounds.  Skin: Warm, Dry, No erythema, No rash.   Back: No tenderness, No CVA tenderness.   Extremities: Intact distal pulses, No edema, No tenderness, No cyanosis, No clubbing.   Neurologic: Alert & oriented x 3, Normal motor function, Normal sensory function, No focal deficits noted.       RADIOLOGY/PROCEDURES  Results for orders placed or performed during the hospital encounter of 05/06/21   CBC WITH DIFFERENTIAL   Result Value Ref Range    WBC 10.3 4.8 - 10.8 K/uL    RBC 3.75 (L) 4.20 - 5.40 M/uL    Hemoglobin 12.1 12.0 - 16.0 g/dL    Hematocrit 37.7 37.0 - 47.0 %    .5 (H) 81.4 - 97.8 fL    MCH 32.3 27.0 - 33.0 pg    MCHC 32.1 (L) 33.6 - 35.0 g/dL    RDW 55.1 (H) 35.9 - 50.0 fL    Platelet Count 309 164 - 446 K/uL    MPV 11.4 9.0 - 12.9 fL    Neutrophils-Polys 65.30 44.00 - 72.00 %    Lymphocytes 23.40 22.00 - 41.00 %    Monocytes 7.30 0.00 - 13.40 %    Eosinophils 2.50 0.00 - 6.90 %    Basophils 0.90 0.00 - 1.80 %    Immature Granulocytes 0.60 0.00 - 0.90 %    Nucleated RBC 0.00 /100 WBC    Neutrophils (Absolute) 6.74 2.00 - 7.15 K/uL    Lymphs (Absolute) 2.41 1.00 - 4.80 K/uL    Monos (Absolute) 0.75 0.00 - 0.85 K/uL    Eos  (Absolute) 0.26 0.00 - 0.51 K/uL    Baso (Absolute) 0.09 0.00 - 0.12 K/uL    Immature Granulocytes (abs) 0.06 0.00 - 0.11 K/uL    NRBC (Absolute) 0.00 K/uL   COMP METABOLIC PANEL   Result Value Ref Range    Sodium 135 135 - 145 mmol/L    Potassium 4.1 3.6 - 5.5 mmol/L    Chloride 107 96 - 112 mmol/L    Co2 15 (L) 20 - 33 mmol/L    Anion Gap 13.0 7.0 - 16.0    Glucose 99 65 - 99 mg/dL    Bun 10 8 - 22 mg/dL    Creatinine 0.70 0.50 - 1.40 mg/dL    Calcium 8.8 8.4 - 10.2 mg/dL    AST(SGOT) 9 (L) 12 - 45 U/L    ALT(SGPT) <5 2 - 50 U/L    Alkaline Phosphatase 131 (H) 30 - 99 U/L    Total Bilirubin 0.2 0.1 - 1.5 mg/dL    Albumin 3.4 3.2 - 4.9 g/dL    Total Protein 7.4 6.0 - 8.2 g/dL    Globulin 4.0 (H) 1.9 - 3.5 g/dL    A-G Ratio 0.9 g/dL   LIPASE   Result Value Ref Range    Lipase 47 7 - 58 U/L   URINALYSIS    Specimen: Blood   Result Value Ref Range    Color Yellow     Character Clear     Specific Gravity 1.010 <1.035    Ph 6.5 5.0 - 8.0    Glucose Negative Negative mg/dL    Ketones Negative Negative mg/dL    Protein Negative Negative mg/dL    Bilirubin Negative Negative    Nitrite Negative Negative    Leukocyte Esterase Negative Negative    Occult Blood Negative Negative    Micro Urine Req see below    MAGNESIUM   Result Value Ref Range    Magnesium 2.2 1.5 - 2.5 mg/dL   PHOSPHORUS   Result Value Ref Range    Phosphorus 3.0 2.5 - 4.5 mg/dL   ESTIMATED GFR   Result Value Ref Range    GFR If African American >60 >60 mL/min/1.73 m 2    GFR If Non African American >60 >60 mL/min/1.73 m 2         COURSE & MEDICAL DECISION MAKING  Pertinent Labs & Imaging studies reviewed. (See chart for details)    Patient presents today with an acute exacerbation of chronic abdominal pain.  This is resulting in fairly rapid weight loss.  Please refer to the note from the patient's physician Dr. Kerr.  I note fairly rapid weight loss as well especially compared to the patient's profile picture.  Laboratory studies here are unrevealing.  No  "\"red flags\".  I have requested records from [facility].  Given the patient's intractable abdominal pain associated with nausea and vomiting I feel that she warrants admission to the hospital for further evaluation and treatment.  I spoke with the on-call hospitalist for admission.        FINAL IMPRESSION  1. Nausea and vomiting, intractability of vomiting not specified, unspecified vomiting type    2. Abdominal pain, unspecified abdominal location    3. Weight loss            Electronically signed by: Jim Cornell M.D., 5/6/2021 1:29 PM     "

## 2021-05-06 NOTE — PROGRESS NOTES
Follow up patient note  Pain Medicine, Interventional spine and sports physiatry, Physical medicine rehabilitation    Date of Service: 05/06/2021    Chief complaint:   Joint pain      HISTORY    Please see new patient note dated 06/08/2018 by Dr Kerr,  for more details.     HPI  Patient identification: Mary Martinez 50 y.o. female returns for follow-up of her pain related to rheumatoid arthritis.      Interval history:   Since the last visit, Mary repots that she has had continued weight loss and abdominal pain.  From what she reports, she has been unable to arrange follow-up with PCP or Dr. Graf.  I tried to call her PCP's office and was directed to voice mail.    She repots that her pain is 10/10 on the NRS.  She has had episodes of bloody emesis and reports that she is light-headed.     Her abdominal pain is worse than the chrpnic musculoskeletal pain for which I see her.  She has Rheumatoid arthritis with arthritis in the right shoulder and cervical spine as well as other joints.  Her pain has been manageable with current pain medications.  For pain, she is taking percocet 7.5/325 five a day, #150 per month and stable. No side effects from this.  No bowel or bladder changes.  Bowel movements are regular.    She is here with her mother today.      PHQ-9: 27, denies suicidality   ORT: 4    ROS  See HPI    Red Flags :   Fever, Chills, Sweats: Denies  Involuntary Weight Loss: Denies  Bowel/Bladder Incontinence: Denies      PMHx:   Past Medical History:   Diagnosis Date   • Arthritis     Rheumatoid -follows with rheumatologist. Has several fractures due to RA.   • ASTHMA     inhalers prn   • Bowel habit changes     4/24/20-constipation and diarrhea.   • Bronchitis last approx 2018   • Chronic pain 04/24/2020    Due to RA   • Dental disorder     no teeth upper-does not wear her denture. Broken teeth on bottom.   • Heart burn     taking famotidine   • Hiatus hernia syndrome    • History of pancreatitis     • Indigestion     taking famotidine   • Pain     everywhere   • Pneumonia 10/2019   • Psychiatric problem     anxiety and depression   • Rheumatoid arthritis(714.0) 2003       PSHx:   Past Surgical History:   Procedure Laterality Date   • PB ENDOSCOPIC US EXAM, ESOPH  4/29/2020    Procedure: EGD, WITH ENDOSCOPIC US;  Surgeon: Jorge Brooke M.D.;  Location: [facility];  Service: Gastroenterology   • GASTROSCOPY-ENDO  4/29/2020    Procedure: GASTROSCOPY;  Surgeon: Jorge Brooke M.D.;  Location: [facility];  Service: Gastroenterology   • EGD WITH ASP/BX  4/29/2020    Procedure: EGD, WITH ASPIRATION BIOPSY - POSS FNA;  Surgeon: Jorge Brooke M.D.;  Location: [facility];  Service: Gastroenterology   • PB EXPLORATORY OF ABDOMEN N/A 10/4/2019    Procedure: LAPAROTOMY, EXPLORATORY AND REPAIR OF DUODENAL PERFORATION;  Surgeon: James Dumont M.D.;  Location: [facility];  Service: General   • COLONOSCOPY  2018    with upper endoscopy   • FINGER ARTHROPLASTY Right 6/5/2017    Procedure: FINGER ARTHROPLASTY - LONG, RING AND SMALL VOLAR PLATE;  Surgeon: Lobo Rosen M.D.;  Location: SURGERY SAME DAY [facility];  Service:    • FINGER AMPUTATION  6/5/2017    Procedure: FINGER AMPUTATION - LONG, RING AND SMALL AT THE PROXIMAL INTERPHALANGEAL JOINT;  Surgeon: Lobo Rosen M.D.;  Location: SURGERY SAME DAY [facility];  Service:    • FINGER ARTHROPLASTY Right 4/17/2017    Procedure: FINGER ARTHROPLASTY ;  Surgeon: Lobo Rosen M.D.;  Location: SURGERY SAME DAY [facility];  Service:    • FINGER AMPUTATION Right 9/14/2016    Procedure: FINGER AMPUTATION INDEX;  Surgeon: Lobo Rosen M.D.;  Location: SURGERY SAME DAY [facility];  Service:    • IRRIGATION & DEBRIDEMENT ORTHO Right 9/11/2016    Procedure: IRRIGATION & DEBRIDEMENT ORTHO - right index finger;  Surgeon: Madhu Rosen M.D.;  Location: SURGERY  [facility];  Service:    • PIP ARTHRODESIS Right 8/29/2016    Procedure: RE-DO INDEX FINGER PROXIMAL INTERPHALANGEAL ARTHRODESIS;  Surgeon: Lobo Rosen M.D.;  [facility];  Service:    • BONE GRAFT Right 8/29/2016    Procedure: BONE GRAFT - DISTAL RADIUS ;  Surgeon: Lobo Rosen M.D.;  [facility];  Service:    • ARTHRODESIS Right 5/6/2016    Procedure: ARTHRODESIS INDEX FINGER PROXIMAL INTERPHALANGEAL;  Surgeon: Lobo Rosen M.D.;  [facility];  Service:    • FOOT RECONSTRUCTION RHEUMATIC Left 7/29/2015    Procedure: FOOT RECONSTRUCTION RHEUMATIC;  Surgeon: Heriberto Alves M.D.;  Location: SURGERY [facility];  Service:    • TOE FUSION Right 5/27/2015    Procedure: TOE FUSION 1ST METATARSALPHALANGEAL JOINT;  Surgeon: Heriberto Alves M.D.;  Location: SURGERY [facility];  Service:    • BONE SPUR EXCISION  5/27/2015    Procedure: BONE SPUR EXCISION METATARSAL HEAD 2-3;  Surgeon: Heriberto Alves M.D.;  Location: SURGERY [facility];  Service:    • HAMMERTOE CORRECTION  5/27/2015    Procedure: HAMMERTOE CORRECTION AND SOFT TISSUE RE-ALINGMENT 2-3 ;  Surgeon: Heriberto Alves M.D.;  Location: SURGERY [facility];  Service:    • DENTAL EXTRACTION(S)  2014    all of upper teeth   • ABDOMINAL ABSCESS DRAINAGE  9/27/2011    Performed by VERO WRIGHT at [facility]   • EMANUEL BY LAPAROSCOPY  9/27/2011    Performed by VERO WRIGHT at [facility]   • APPENDECTOMY  2011    Found out it had ruptured prior to abcess surgery   • REPEAT C SECTION  2008   • REPEAT C SECTION  2005   • REPEAT C SECTION  1999   • PRIMARY C SECTION  1991   • TONSILLECTOMY  1982   • WRIST EXPLORATION Left 1980's    fractured wrist-no hardware       Family history   History reviewed. No pertinent family history.      Medications:   Outpatient Medications Marked as Taking for the 5/6/21  encounter (Office Visit) with Jayy Kerr M.D.   Medication Sig Dispense Refill   • oxyCODONE-acetaminophen (PERCOCET) 7.5-325 MG per tablet Take 1 tablet by mouth every four hours as needed for up to 30 days. 150 tablet 0   • ENBREL 50 MG/ML Solution Prefilled Syringe INJECT 50 MG ONCE WEEKLY UNDERNEATH THE SKIN  FOR 28 DAYS     • ondansetron (ZOFRAN ODT) 8 MG TABLET DISPERSIBLE Take 8 mg by mouth every 8 hours as needed.     • QUEtiapine (SEROQUEL) 100 MG Tab Take 100 mg by mouth every evening.     • PARoxetine (PAXIL) 30 MG Tab Take 60 mg by mouth every evening.     • albuterol (VENTOLIN OR PROVENTIL) 108 (90 BASE) MCG/ACT AERS inhalation aerosol Inhale 2 Puffs by mouth every four hours as needed for Shortness of Breath.         Allergies:   Allergies   Allergen Reactions   • Penicillins Anaphylaxis and Hives     Tolerates cephalosporins; reports throat swelling with PCN   • Aripiprazole Nausea     Spasms, shaking     • Nitrous Oxide Vomiting       Social Hx:   Social History     Socioeconomic History   • Marital status:      Spouse name: Not on file   • Number of children: Not on file   • Years of education: Not on file   • Highest education level: Not on file   Occupational History   • Not on file   Tobacco Use   • Smoking status: Current Every Day Smoker     Packs/day: 1.00     Years: 30.00     Pack years: 30.00     Types: Cigarettes   • Smokeless tobacco: Never Used   • Tobacco comment: 4/24/20-now smoking approx 1/2 PPD.   Vaping Use   • Vaping Use: Never used   Substance and Sexual Activity   • Alcohol use: Never   • Drug use: Never   • Sexual activity: Not on file   Other Topics Concern   •  Service No   • Blood Transfusions Yes   • Caffeine Concern No   • Occupational Exposure No   • Hobby Hazards No   • Sleep Concern No   • Stress Concern Yes   • Weight Concern No   • Special Diet No   • Back Care No   • Exercise Yes   • Bike Helmet Yes   • Seat Belt Yes   • Self-Exams Yes   Social  "History Narrative    ** Merged History Encounter **          Social Determinants of Health     Financial Resource Strain:    • Difficulty of Paying Living Expenses:    Food Insecurity:    • Worried About Running Out of Food in the Last Year:    • Ran Out of Food in the Last Year:    Transportation Needs:    • Lack of Transportation (Medical):    • Lack of Transportation (Non-Medical):    Physical Activity:    • Days of Exercise per Week:    • Minutes of Exercise per Session:    Stress:    • Feeling of Stress :    Social Connections:    • Frequency of Communication with Friends and Family:    • Frequency of Social Gatherings with Friends and Family:    • Attends [employer] Services:    • Active Member of Clubs or Organizations:    • Attends Club or Organization Meetings:    • Marital Status:    Intimate Partner Violence:    • Fear of Current or Ex-Partner:    • Emotionally Abused:    • Physically Abused:    • Sexually Abused:          EXAMINATION     Physical Exam:   Vitals: /74 (BP Location: Right arm, Patient Position: Sitting, BP Cuff Size: Small adult)   Pulse 100   Temp 37.4 °C (99.3 °F) (Temporal)   Ht 1.6 m (5' 3\")   Wt 47 kg (103 lb 9.9 oz)   SpO2 99%     Constitutional:   Body Habitus: Body mass index is 18.35 kg/m².  Cooperation: Fully cooperates with exam  Appearance: Appears pale, She is wearing a mask  Respiratory-  breathing comfortable on room air, no audible wheezing  Cardiovascular- no lower extremity edema is observed.  Heart rate is regular, but tachycardic  Psychiatric- alert and oriented ×3. Normal affect.     Musculoskeletal:  Right shoulder with crepitus and pain with ROM less than 90 degrees before pain worsens.      Partial hand amputation on the right at the MCPs; ulnar deviation on the left with MCP subluxation, mild atrophy of the hand intrinsics with wasting of the thenar eminence.     Gait is slow, steady without assist device.      MEDICAL DECISION MAKING    DATA    Labs: UDS " 10/30/2018 consistent with medications.  Oxycodone and metabolites without other substances   UDS 04/19/2019 consistent with medications. Oxycodone and metabolites without other substances   UDS 07/19/2019 consistent with medications.  Oxycodone and metabolites without other substances   UDS 11/22/2019 consistent with medications.  Oxycodone and metabolites without other substances   UDS 02/20/2020 absent for Oxycodone and metabolites without other substances  UDS 06/10/2020 positive for oxycodone and metabolites without other substances  UDS 08/18/2020 positive for oxycodone and metabolites, positive for EtG without EtS    Imaging:    Films reviewed.  These are my reads:    Xray right shoulder 08/20/2020  There is note of severe degenerative change of the glenohumeral joint.  No fracture.  Erosive changes, consistent with known history of RA    MRI cervical spine 07/31/2018:    There is is note of motion at the atlantodental level with 5mm atlantodental inverval in flexion that reduces to 1-2 mm in extension.  Mild retrolisthesis of C4 on C5 and anterolisthesis of C5- on C6.  Disc extrusion at C6-7 with mild central canal stenosis    Xray left shoulder 2/5/2018 Humeral head is elevated.  Glenohumeral joint arthritis.    Reports reviewed:    Xray right shoulder 08/20/2020 [facility]  Impression  Extensive erosive changes of the humeral head and degenerative changes of the glenohumeral joint.  Findings are concerning for inflammatory arthropathy such as rheumatoid arthritis.      Xray cervical spine 11/14/2018  1. No acute fracture  2. Persistent motion at the C1-2 joint as before, suggesting atlantoaxial instability  3. Minimal instability noted at C5-6 level as described.    MRI cervical spine 07/31/2018  1. Widening of the atlantodental interal in neutral and flexion positions with a 5mm space which reduces to 1-2 mm in extension  2. Degenerative changes with the disc extrusion at C6-7 level causing mild canal  stenosis    Xray cervical spine 07/06/2018: Atlantoaxial instability at C1-2     Xrays knees 07/06/2018  Left: Unremarkable  Right: Unremarkable             DIAGNOSIS   Visit Diagnoses     ICD-10-CM   1. Rheumatoid arthritis involving multiple sites with positive rheumatoid factor (HCC)  M05.79   2. Arthritis of glenohumeral joint  M19.019   3. 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 Unintentional weight loss  R63.4   6. Nausea  R11.0         ASSESSMENT and PLAN:     Mary Martinez 51 y.o. female with rheumatoid arthritis    Mary was seen today for follow-up.    Orders and management for this visit:    Orders Placed This Encounter   • oxyCODONE-acetaminophen (PERCOCET) 7.5-325 MG per tablet   • Consent for Opiate Prescription   • Controlled Substance Treatment Agreement       1. I contacted the ED and asked that she go in for evaluation.  She had been seen at [facility], but felt that they treated her like a drug seeker.  She has been consistent with her care here and am concerned that her progressive weight loss and difficulty in being able to tolerate eating is concerning.  Spoke with charge nurse, Baljinder at the [facility] ED.  Patient is agreeable to evaluation  2. Encouraged her to follow-up with other provideres, including her PCP, Dr. Christopher and GI specialist, Dr. Graf, particularly given difficulty she is having in maintaining her weight and hydration.  3. Discussed percocet 7.5/325#150 per month.   reviewed.  Plan to continue current dose.   4. Continue psychology and psychiatry.  She denies suicidality, but is having difficulty with current ongoing medical condition     In prescribing controlled substances to this patient, I certify that I have obtained and reviewed the medical history of Mary Martinez. I have also made a good aristides effort to obtain applicable records from other providers who have treated the patient and records did  not demonstrate any increased risk of substance abuse that would prevent me from prescribing controlled substances.     I have conducted a physical exam and documented it. I have reviewed Ms. Martinez’s prescription history as maintained by the [place] Prescription Monitoring Program.   ORT 4, indicates moderate risk    I have assessed the patient’s risk for abuse, dependency, and addiction using the validated Opioid Risk Tool available at https://www.mdcalc.com/skoopg-cgru-ewbt-ort-narcotic-abuse.     Given the above, I believe the benefits of controlled substance therapy outweigh the risks. The reasons for prescribing controlled substances include non-narcotic, oral analgesic alternatives have been inadequate for pain control and in my professional opinion, controlled substances are the only reasonable choice for this patient because her pain was note adequately controlled with alternatives and she has chronic progressive disease. Accordingly, I have discussed the risk and benefits, treatment plan, and alternative therapies with the patient.       Follow up: 1 month      Please note that this dictation was created using voice recognition software. I have made every reasonable attempt to correct obvious errors but there may be errors of grammar and content that I may have overlooked prior to finalization of this note.      Jayy Kerr MD  Interventional Spine and Sports Physiatry  Physical Medicine and Rehabilitation  [facility]      CC: Dr. Fidencio Christopher; Dr. Lobo Graf

## 2021-05-06 NOTE — ED TRIAGE NOTES
Pt to er upon recommendation from pcp for increase in epigastric abd pain/n/v with loss of appetite since Sunday. States hx of same, followed by dr strickland. Last po was last evening, taking pepcid (5/5 pm), last zofran (5/5 2200) w/o relief. Denies recent travel, or known covid exposure. Has not recvd covid vaccinations. No aciive vomiting in triage. Npo and urine spec requested in triage. Blood work drawn per protocol

## 2021-05-06 NOTE — ASSESSMENT & PLAN NOTE
Patient presenting with intractable nausea and vomiting of been going on since January of this year.  This is chronic in nature, progressively worse since January 2021, noted 30lb unintentional weight loss   Has a history of duodenal ulceration rupture with repair about a year ago.  Has been followed outpatient extensively by GI and PCP for this chronic history and multiple recent testing including EGD/CT scan/blood work has all been unremarkable.  Of note patient follows up with [facility], Dr. Graf, and has had multiple diagnostic evaluations and blood work without any definitive findings. States she had an EGD performed about a year ago that came back unremarkable.  On this admission patient had a CT of the abdomen and pelvis with IV contrast with no remarkable findings.    Hx of Hiatal hernia, noted on previous CT imaging and endoscopies. Abdominal pain worsens with eating or drinking fluid. No prior surgical evaluation

## 2021-05-06 NOTE — H&P
Hospital Medicine History & Physical Note    Date of Service  5/6/2021    Primary Care Physician  Fidencio Christopher D.O.    Consultants  none    Code Status  Full Code    Chief Complaint  Chief Complaint   Patient presents with   • Abdominal Pain     epigastric increased since sunday    • N/V     increased since sunday   • Loss of Appetite     for months       History of Presenting Illness  51 y.o. female who presented 5/6/2021 with complains of worsening acute on chronic abdominal pain.  Patient states she has been dealing with worsening chronic abdominal pain and heartburn since January of this year that has progressively gotten worse over the last several months. She also noticed unintentional weight loss as well. She was seen by her PCP in the office earlier today and advised to go to the emergency room for evaluation due to her rapid weight loss over several months.  Patient states that is a chronic issue and has been suffering from intractable nausea/vomiting with mostly solid food intake. States it happens also with liquids but intermittently. Denies having any dysphagia-like symptoms.  State she also has had midepigastric pain/tenderness. States the past few month she has had really poor p.o. intake and has been unable to tolerate any solid foods.  States she is also had loss of appetite over several months as well. She notes a history of duodenal ulcer rupture in 2019 s/p repair. States her symptoms initially started over that period of time and was subsequently diagnosed with duodenal ulcer rupture.  States she been seen outpatient by GI-Dr. Encinas, and has had multiple diagnostic evaluation and blood work without any definitive findings. States she had an EGD performed about a year ago that came back unremarkable.  States she went over to [facility] yesterday and had a CT without contrast which came back unrevealing.  States her blood work was also unrevealing. States she did not like the way she  was treated over there and was discharged home without any intervention. States her PCP wanted her to come [facility] to [facility] for further evaluation.    In the ED, vital signs notable for significant hypertension. Blood work was mostly unrevealing. She received IV fluids and will be admitted to the hospital for further evaluation/management.    Review of Systems  Review of Systems   Constitutional: Positive for malaise/fatigue and weight loss.   Eyes: Negative.    Respiratory: Negative.    Cardiovascular: Negative.    Gastrointestinal: Positive for abdominal pain, nausea and vomiting.   Genitourinary: Negative.    Musculoskeletal: Negative.    All other systems reviewed and are negative.      Past Medical History   has a past medical history of Arthritis, ASTHMA, Bowel habit changes, Bronchitis (last approx 2018), Chronic pain (04/24/2020), Dental disorder, Heart burn, Hiatus hernia syndrome, History of pancreatitis, Indigestion, Pain, Pneumonia (10/2019), Psychiatric problem, and Rheumatoid arthritis(714.0) (2003).    Surgical History   has a past surgical history that includes abdominal abscess drainage (9/27/2011); toe fusion (Right, 5/27/2015); bone spur excision (5/27/2015); hammertoe correction (5/27/2015); foot reconstruction rheumatic (Left, 7/29/2015); arthrodesis (Right, 5/6/2016); pip arthrodesis (Right, 8/29/2016); bone graft (Right, 8/29/2016); irrigation & debridement ortho (Right, 9/11/2016); finger amputation (Right, 9/14/2016); finger arthroplasty (Right, 4/17/2017); finger arthroplasty (Right, 6/5/2017); finger amputation (6/5/2017); pr exploratory of abdomen (N/A, 10/4/2019); tonsillectomy (1982); primary c section (1991); repeat c section (1999); repeat c section (2005); repeat c section (2008); appendectomy (2011); nicholas by laparoscopy (9/27/2011); wrist exploration (Left, 1980's); colonoscopy (2018); dental extraction(s) (2014); pr endoscopic us exam, esoph (4/29/2020);  gastroscopy-endo (4/29/2020); and egd with asp/bx (4/29/2020).     Family History  family history is not on file.     Social History   reports that she has been smoking cigarettes. She has a 30.00 pack-year smoking history. She has never used smokeless tobacco. She reports that she does not drink alcohol and does not use drugs.    Allergies  Allergies   Allergen Reactions   • Penicillins Anaphylaxis and Hives     Tolerates cephalosporins; reports throat swelling with PCN   • Aripiprazole Nausea     Spasms, shaking     • Nitrous Oxide Vomiting       Medications  Prior to Admission Medications   Prescriptions Last Dose Informant Patient Reported? Taking?   ENBREL 50 MG/ML Solution Prefilled Syringe 5/4/2021 at unk Patient Yes No   Sig: INJECT 50 MG ONCE WEEKLY UNDERNEATH THE SKIN  FOR 28 DAYS   Naloxone (NARCAN) 4 MG/0.1ML Liquid prn at prn Patient No No   Sig: One spray in one nostril for overdose and call 911.   PARoxetine (PAXIL) 30 MG Tab 5/5/2021 at pm Patient Yes No   Sig: Take 60 mg by mouth every evening.   QUEtiapine (SEROQUEL) 100 MG Tab 5/5/2021 at pm Patient Yes No   Sig: Take 100 mg by mouth every evening.   QUEtiapine (SEROQUEL) 25 MG Tab 5/5/2021 at prn Patient Yes No   Sig: Take 25 mg by mouth 2 times a day as needed for Anxiety.   albuterol (VENTOLIN OR PROVENTIL) 108 (90 BASE) MCG/ACT AERS inhalation aerosol prn at prn Patient Yes No   Sig: Inhale 2 Puffs by mouth every four hours as needed for Shortness of Breath.   ondansetron (ZOFRAN ODT) 8 MG TABLET DISPERSIBLE 5/5/2021 at 2200 Patient Yes No   Sig: Take 8 mg by mouth every 8 hours as needed.   oxyCODONE-acetaminophen (PERCOCET) 7.5-325 MG per tablet 5/6/2021 at 1000 Patient No No   Sig: Take 1 tablet by mouth every four hours as needed for up to 30 days.      Facility-Administered Medications: None       Physical Exam  Temp:  [35.8 °C (96.4 °F)] 35.8 °C (96.4 °F)  Pulse:  [82] 82  Resp:  [18] 18  BP: (108)/(69) 108/69  SpO2:  [97 %] 97  %    Physical Exam  Constitutional:       Appearance: She is ill-appearing.   HENT:      Head: Normocephalic and atraumatic.   Eyes:      Conjunctiva/sclera: Conjunctivae normal.   Cardiovascular:      Rate and Rhythm: Normal rate.      Pulses: Normal pulses.   Pulmonary:      Effort: Pulmonary effort is normal.      Breath sounds: Normal breath sounds.   Abdominal:      General: Bowel sounds are normal.      Palpations: Abdomen is soft.      Tenderness: There is abdominal tenderness. There is no guarding or rebound.      Comments: Midepigastric tenderness noted.   Skin:     General: Skin is dry.   Neurological:      General: No focal deficit present.      Mental Status: She is alert and oriented to person, place, and time.   Psychiatric:         Mood and Affect: Mood normal.         Behavior: Behavior normal.         Laboratory:  Recent Labs     05/06/21  1222   WBC 10.3   RBC 3.75*   HEMOGLOBIN 12.1   HEMATOCRIT 37.7   .5*   MCH 32.3   MCHC 32.1*   RDW 55.1*   PLATELETCT 309   MPV 11.4     Recent Labs     05/06/21  1222   SODIUM 135   POTASSIUM 4.1   CHLORIDE 107   CO2 15*   GLUCOSE 99   BUN 10   CREATININE 0.70   CALCIUM 8.8     Recent Labs     05/06/21  1222   ALTSGPT <5   ASTSGOT 9*   ALKPHOSPHAT 131*   TBILIRUBIN 0.2   LIPASE 47   GLUCOSE 99         No results for input(s): NTPROBNP in the last 72 hours.      No results for input(s): TROPONINT in the last 72 hours.    Imaging:  CT-ABDOMEN-PELVIS WITH    (Results Pending)         Assessment/Plan:  I anticipate this patient is appropriate for observation status at this time.    * Nausea & vomiting- (present on admission)  Assessment & Plan  Patient presenting with intractable nausea and vomiting of been going on since January of this year.  This is chronic in nature and per the patient has gotten progressively worse over the past several months and also has been losing lots of weight due to poor p.o. intake.  Has a history of duodenal ulceration rupture  with repair about a year ago.  Has been followed outpatient extensively by GI and PCP for this chronic history and multiple recent testing including EGD/CT scan/blood work has all been unremarkable.  States she was seen at [facility] yesterday and had a CT scan done without contrast that came back unremarkable.    Etiology remains unclear at this time.    Antiemetic as needed.  IV fluids.  Clear liquid diet and advance as appropriate.  H2 blocker.  May need GI reconsulted in the hospital if her symptom does not improve.    History of perforation of posterior duodenum pancreatitis abscess- (present on admission)  Assessment & Plan  Continue home medication-Pepcid.  Status post ex lap with DU repair 10/4 by Dr. Chaudhari    History of rheumatoid arthritis- (present on admission)  Assessment & Plan  Continue home medication-Enbrel.  Outpatient follow-up.    Protein calorie malnutrition (HCC)- (present on admission)  Assessment & Plan  BMI 18.2.  Patient with chronic abdominal pain and intractable nausea and vomiting.  Dietitian consulted.    Chronic pain- (present on admission)  Assessment & Plan  History of chronic abdominal pain syndrome  Continue home pain regimen.

## 2021-05-07 ENCOUNTER — APPOINTMENT (OUTPATIENT)
Dept: RADIOLOGY | Facility: MEDICAL CENTER | Age: 51
End: 2021-05-07
Attending: GENERAL PRACTICE
Payer: MEDICAID

## 2021-05-07 PROBLEM — K44.9 HIATAL HERNIA: Status: ACTIVE | Noted: 2021-05-07

## 2021-05-07 PROBLEM — F34.1 DYSTHYMIA: Status: ACTIVE | Noted: 2021-05-07

## 2021-05-07 PROBLEM — R63.4 UNINTENTIONAL WEIGHT LOSS: Status: ACTIVE | Noted: 2021-05-07

## 2021-05-07 PROBLEM — R59.9 ENLARGED LYMPH NODES: Status: ACTIVE | Noted: 2021-05-07

## 2021-05-07 LAB
ALBUMIN SERPL BCP-MCNC: 2.9 G/DL (ref 3.2–4.9)
ALBUMIN/GLOB SERPL: 0.9 G/DL
ALP SERPL-CCNC: 117 U/L (ref 30–99)
ALT SERPL-CCNC: <5 U/L (ref 2–50)
ANION GAP SERPL CALC-SCNC: 8 MMOL/L (ref 7–16)
AST SERPL-CCNC: 7 U/L (ref 12–45)
BILIRUB SERPL-MCNC: 0.3 MG/DL (ref 0.1–1.5)
BUN SERPL-MCNC: 8 MG/DL (ref 8–22)
CALCIUM SERPL-MCNC: 8.2 MG/DL (ref 8.4–10.2)
CHLORIDE SERPL-SCNC: 113 MMOL/L (ref 96–112)
CO2 SERPL-SCNC: 16 MMOL/L (ref 20–33)
CREAT SERPL-MCNC: 0.69 MG/DL (ref 0.5–1.4)
ERYTHROCYTE [DISTWIDTH] IN BLOOD BY AUTOMATED COUNT: 52.5 FL (ref 35.9–50)
GLOBULIN SER CALC-MCNC: 3.1 G/DL (ref 1.9–3.5)
GLUCOSE SERPL-MCNC: 94 MG/DL (ref 65–99)
HCT VFR BLD AUTO: 31.6 % (ref 37–47)
HGB BLD-MCNC: 9.8 G/DL (ref 12–16)
MAGNESIUM SERPL-MCNC: 2 MG/DL (ref 1.5–2.5)
MCH RBC QN AUTO: 30.5 PG (ref 27–33)
MCHC RBC AUTO-ENTMCNC: 31 G/DL (ref 33.6–35)
MCV RBC AUTO: 98.4 FL (ref 81.4–97.8)
PLATELET # BLD AUTO: 251 K/UL (ref 164–446)
PMV BLD AUTO: 11 FL (ref 9–12.9)
POTASSIUM SERPL-SCNC: 4 MMOL/L (ref 3.6–5.5)
PROT SERPL-MCNC: 6 G/DL (ref 6–8.2)
RBC # BLD AUTO: 3.21 M/UL (ref 4.2–5.4)
SARS-COV-2 RNA RESP QL NAA+PROBE: NOTDETECTED
SODIUM SERPL-SCNC: 137 MMOL/L (ref 135–145)
SPECIMEN SOURCE: NORMAL
WBC # BLD AUTO: 7.4 K/UL (ref 4.8–10.8)

## 2021-05-07 PROCEDURE — 700111 HCHG RX REV CODE 636 W/ 250 OVERRIDE (IP): Performed by: STUDENT IN AN ORGANIZED HEALTH CARE EDUCATION/TRAINING PROGRAM

## 2021-05-07 PROCEDURE — G0378 HOSPITAL OBSERVATION PER HR: HCPCS

## 2021-05-07 PROCEDURE — 700101 HCHG RX REV CODE 250: Performed by: STUDENT IN AN ORGANIZED HEALTH CARE EDUCATION/TRAINING PROGRAM

## 2021-05-07 PROCEDURE — 80053 COMPREHEN METABOLIC PANEL: CPT

## 2021-05-07 PROCEDURE — 83735 ASSAY OF MAGNESIUM: CPT

## 2021-05-07 PROCEDURE — A9270 NON-COVERED ITEM OR SERVICE: HCPCS | Performed by: STUDENT IN AN ORGANIZED HEALTH CARE EDUCATION/TRAINING PROGRAM

## 2021-05-07 PROCEDURE — 36415 COLL VENOUS BLD VENIPUNCTURE: CPT

## 2021-05-07 PROCEDURE — 96376 TX/PRO/DX INJ SAME DRUG ADON: CPT

## 2021-05-07 PROCEDURE — 99225 PR SUBSEQUENT OBSERVATION CARE,LEVEL II: CPT | Performed by: GENERAL PRACTICE

## 2021-05-07 PROCEDURE — 71250 CT THORAX DX C-: CPT

## 2021-05-07 PROCEDURE — 76881 US COMPL JOINT R-T W/IMG: CPT

## 2021-05-07 PROCEDURE — 85027 COMPLETE CBC AUTOMATED: CPT

## 2021-05-07 PROCEDURE — 700102 HCHG RX REV CODE 250 W/ 637 OVERRIDE(OP): Performed by: STUDENT IN AN ORGANIZED HEALTH CARE EDUCATION/TRAINING PROGRAM

## 2021-05-07 RX ADMIN — ONDANSETRON 4 MG: 2 INJECTION INTRAMUSCULAR; INTRAVENOUS at 15:39

## 2021-05-07 RX ADMIN — ACETAMINOPHEN 650 MG: 325 TABLET, FILM COATED ORAL at 03:31

## 2021-05-07 RX ADMIN — ACETAMINOPHEN 650 MG: 325 TABLET, FILM COATED ORAL at 10:08

## 2021-05-07 RX ADMIN — FAMOTIDINE 20 MG: 20 TABLET ORAL at 17:45

## 2021-05-07 RX ADMIN — PAROXETINE HYDROCHLORIDE 60 MG: 20 TABLET, FILM COATED ORAL at 17:44

## 2021-05-07 RX ADMIN — OXYCODONE HYDROCHLORIDE 5 MG: 5 TABLET ORAL at 15:26

## 2021-05-07 RX ADMIN — OXYCODONE HYDROCHLORIDE 5 MG: 5 TABLET ORAL at 03:36

## 2021-05-07 RX ADMIN — OXYCODONE HYDROCHLORIDE 5 MG: 5 TABLET ORAL at 09:58

## 2021-05-07 RX ADMIN — FAMOTIDINE 20 MG: 20 TABLET ORAL at 05:11

## 2021-05-07 RX ADMIN — POTASSIUM CHLORIDE AND SODIUM CHLORIDE: 900; 150 INJECTION, SOLUTION INTRAVENOUS at 03:36

## 2021-05-07 RX ADMIN — QUETIAPINE FUMARATE 100 MG: 100 TABLET ORAL at 17:45

## 2021-05-07 RX ADMIN — ACETAMINOPHEN 650 MG: 325 TABLET, FILM COATED ORAL at 17:48

## 2021-05-07 RX ADMIN — OXYCODONE HYDROCHLORIDE 5 MG: 5 TABLET ORAL at 20:13

## 2021-05-07 RX ADMIN — NICOTINE 14 MG: 14 PATCH TRANSDERMAL at 05:11

## 2021-05-07 RX ADMIN — ONDANSETRON 4 MG: 2 INJECTION INTRAMUSCULAR; INTRAVENOUS at 05:19

## 2021-05-07 ASSESSMENT — PAIN DESCRIPTION - PAIN TYPE
TYPE: ACUTE PAIN
TYPE: ACUTE PAIN;CHRONIC PAIN
TYPE: ACUTE PAIN
TYPE: ACUTE PAIN
TYPE: CHRONIC PAIN
TYPE: ACUTE PAIN
TYPE: CHRONIC PAIN

## 2021-05-07 ASSESSMENT — FIBROSIS 4 INDEX: FIB4 SCORE: 0.64

## 2021-05-07 ASSESSMENT — ENCOUNTER SYMPTOMS
WEIGHT LOSS: 1
ABDOMINAL PAIN: 1
WEAKNESS: 1

## 2021-05-07 ASSESSMENT — PATIENT HEALTH QUESTIONNAIRE - PHQ9
2. FEELING DOWN, DEPRESSED, IRRITABLE, OR HOPELESS: NOT AT ALL
1. LITTLE INTEREST OR PLEASURE IN DOING THINGS: NOT AT ALL
SUM OF ALL RESPONSES TO PHQ9 QUESTIONS 1 AND 2: 0

## 2021-05-07 NOTE — DIETARY
"Nutrition services: Day 0 of admit.  Mary Martinez is a 51 y.o. female with admitting DX of Nausea & Vomiting, perforation of posterior duodenum pancreatitis abscess, arthritis, protein calorie malnutrition, chronic pain    Consult received for unexplained Wt loss, MST of 5 due to report of Wt loss 34 or more x 3 months. Report of poor PO. Pt with high risk Dx of protein calorie malnutrition (PCM).      Assessment:  Height: 160 cm (5' 3\")  Weight: 49.4 kg (109 lb)   Body mass index is 19.31 kg/m²., BMI classification: WNL  Diet/Intake: Level 6 Soft & Bite Sized w/thin liquids; % x2, 50-75% x1    Evaluation:   1. Pt admit Wt of 46.8 kg (BMI=18.28). Based on admit Wt, Pt is Underweight.  2. RD met w/ Pt in her room. Pt reports that she lost 30 lb x 4 months. January UBW: 138 lb (62.6 kg); February Wt: 122 lb (55.3 kg); March Wt: 108 lb (49 kg). Wt loss noted of 13.6 kg (22%) x 4 months. Wt loss is significant.  3. Pt reports poor PO, <50% x 4 months PTA related to loss of appetite due to nausea, vomiting and diarrhea. Current PO intake during hospitalization has been adequate. Pt reports she likes plain yogurt and the Berry Chobani Shakes. Pt mentions that she dislikes milk, Boost shakes and Boost Breeze. Pt agreed to try magic cups for additional kcals and protein.  4. RD performed Nutrition Focus Physical Exam (NFPE). Hollowing noted in temple region indicating severe muscle loss.   5. Labs: 5/6 phosphorus= 3 ; 5/7 magnesium= 2  6. IV Fluids: 0.9% NaCl w/KCl 20 mEq infusion @ 83 mL/hr    Malnutrition Risk: Pt with severe malnutrition in the context of acute disease related to diminished appetite due to nausea, vomiting and diarrhea AEB Wt loss of 22% x 4 months, report of PO intake <50% x 4 months PTA  and severe muscle loss in temple region.    Recommendations/Plan:  1. Magic cup ordered TID w/meals.  2. Encourage intake of >50%   3. Document intake of all meals and supplements as % taken in ADL's to " provide interdisciplinary communication across all shifts.   4. Monitor weight.  5. Nutrition rep will continue to see patient for ongoing meal and snack preferences.     RD will follow.

## 2021-05-07 NOTE — ASSESSMENT & PLAN NOTE
1 PPD x 35 years  Nicotine patch in place  Smoking cessation encouraged     No prior CT imaging, last chest x-ray is normal  Given history of smoking, and weight loss  CT chest without contrast noted benign 3 mm nodule in the right lower lobe, stable since prior. Emphysema.

## 2021-05-07 NOTE — CARE PLAN
Problem: Safety  Goal: Will remain free from injury  Outcome: PROGRESSING AS EXPECTED  Intervention: Provide assistance with mobility  Flowsheets (Taken 5/6/2021 2311)  Assistance: No Assistance Required  Ambulation Tolerance: Tolerates Well  Intervention: Collaborate with Interdisciplinary Team for safe transfer and mobilization techniques  Flowsheets (Taken 5/6/2021 2311)  Assistive Devices: None  Goal: Will remain free from falls  Outcome: PROGRESSING AS EXPECTED  Intervention: Assess risk factors for falls  Flowsheets  Taken 5/7/2021 0310  History of fall: 0  Mobility Status Assessment: 0-Ambulates & Transfers Independently. No Assistance Required  Taken 5/6/2021 2311  Pt Calls for Assistance:   Yes   No   No assistance required  Intervention: Implement fall precautions  Flowsheets (Taken 5/6/2021 2311)  Environmental Precautions:   Treaded Slipper Socks on Patient   Personal Belongings, Wastebasket, [place] etc. in Easy Reach   Transferred to Stronger Side   Report Given to Other Health Care Providers Regarding Fall Risk   Bed in Low Position   Communication Sign for Patients & Families   Mobility Assessed & Appropriate Sign Placed     Problem: Bowel/Gastric:  Goal: Normal bowel function is maintained or improved  Outcome: PROGRESSING AS EXPECTED  Goal: Will not experience complications related to bowel motility  Outcome: PROGRESSING AS EXPECTED     Problem: Pain Management  Goal: Pain level will decrease to patient's comfort goal  Outcome: PROGRESSING AS EXPECTED

## 2021-05-07 NOTE — PROGRESS NOTES
Pt requested tylenol and oxy 5 once this shift for generalized pain.  Pt c/o nausea once this shift. Zofran given.  Pt ambulating independently without needing any assistance.  Pt refused to have RN assess skin of feet this shift.

## 2021-05-07 NOTE — ASSESSMENT & PLAN NOTE
Enlarged right axillary lymph nodes noted on MRI from 10/20/2020  Bilateral tender axillary lymph nodes on exam  Patient reports 30 pound weight loss since January 2021  Patient has hiatal hernia (no prior evaluation by surgery)  Patient is up-to-date with mammography, Pap smear, and colonoscopy which have all been normal to date.    CT chest without contrast noted benign 3 mm nodule in the right lower lobe, stable since prior. Emphysema. Interval enlargement of bilateral axillary nodes, which measure up to 1.9 x 1.4 cm on the right and 1.3 x 1.3 cm on the left.  Ultrasound of bilateral axilla noted multiple bilateral enlarged axillary lymph nodes which measure up to 2.5 cm in greatest dimension on the right and 2.0 cm on the left.

## 2021-05-07 NOTE — PROGRESS NOTES
2 RN skin check w/ Tana. Feet dry/cracking, coccyx red blanchable, and amputation of 4 fingers on the R hand. All other skin clean, dry and intact.

## 2021-05-07 NOTE — ASSESSMENT & PLAN NOTE
Enlarged right axillary lymph nodes noted on MRI from 10/20/2020  Bilateral tender axillary lymph nodes on exam  Patient reports 30 pound weight loss since January 2021  CT chest without contrast noted interval enlargement of bilateral axillary nodes, which measure up to 1.9 x 1.4 cm on the right and 1.3 x 1.3 cm on the left.  Ultrasound of bilateral axilla noted multiple bilateral enlarged axillary lymph nodes which measure up to 2.5 cm in greatest dimension on the right and 2.0 cm on the left.  Patient for biopsy 5/10

## 2021-05-07 NOTE — ASSESSMENT & PLAN NOTE
Noted on previous CT imaging and endoscopies  Abdominal pain worsens with eating or drinking fluid  No prior surgical evaluation   Declines

## 2021-05-07 NOTE — PROGRESS NOTES
[facility] Medicine Daily Progress Note    Date of Service  5/7/2021    Chief Complaint  51 y.o. female admitted 5/6/2021 with abdominal pain, weight loss    Hospital Course  This is a 51-year-old female with past medical history of depression, anxiety, rheumatoid arthritis, tobacco dependence, COPD, chronic pain syndrome (follows with pain management) and history of duodenal ulcer rupture in 10/2019 status post repair, history of hiatal hernia who presented on 05/06 with acute on chronic abdominal pain associated with unintentional weight loss since January 2021.  Patient went to [place] the day prior to admission and had a CT without contrast which came back unremarkable.    Of note patient follows up with [facility], Dr. Graf, and has had multiple diagnostic evaluations and blood work without any definitive findings. States she had an EGD performed about a year ago that came back unremarkable.    On this admission patient had a CT of the abdomen and pelvis with IV contrast with no remarkable findings.      Interval Problem Update  Patient was seen and examined at bedside.  Patient reports she has had a 30 pound unintentional weight loss since January 2021.  She admits to worsening abdominal pain with food and liquids.  She has had multiple imaging and endoscopies prior with no etiology for the pain.  She does have a hiatal hernia which has not been evaluated by surgery.    She reports a questionable mass noted on MRI of her shoulder.  Upon review it was noted she had enlarged right-sided axillary lymph nodes. On exam she is tender to palpation alongside both axilla.  Ultrasound of the axilla bilaterally ordered.    Patient is a current smoker, no prior CAT scans of the chest.  CT chest without contrast ordered.    Patient reports her nausea is controlled, and would like to advance her diet.  We have started the soft of bite-size diet for her.    Consultants/Specialty  None    Code Status  Full  Code    Disposition  TBD    Review of Systems  Review of Systems   Constitutional: Positive for malaise/fatigue and weight loss.   Gastrointestinal: Positive for abdominal pain.   Neurological: Positive for weakness.        Physical Exam  Temp:  [35.8 °C (96.4 °F)-37 °C (98.6 °F)] 36.6 °C (97.8 °F)  Pulse:  [68-84] 81  Resp:  [16-18] 18  BP: ()/(52-73) 85/52  SpO2:  [94 %-99 %] 98 %    Physical Exam  Vitals and nursing note reviewed.   Constitutional:       General: She is not in acute distress.     Appearance: Normal appearance. She is cachectic.   HENT:      Head: Normocephalic and atraumatic.      Mouth/Throat:      Mouth: Mucous membranes are moist.      Pharynx: No oropharyngeal exudate.   Eyes:      Extraocular Movements: Extraocular movements intact.      Pupils: Pupils are equal, round, and reactive to light.   Cardiovascular:      Rate and Rhythm: Normal rate and regular rhythm.      Pulses: Normal pulses.      Heart sounds: No murmur. No friction rub. No gallop.    Pulmonary:      Effort: Pulmonary effort is normal. No respiratory distress.      Breath sounds: No wheezing, rhonchi or rales.   Abdominal:      General: Bowel sounds are normal. There is no distension.      Palpations: Abdomen is soft. There is no mass.      Tenderness: There is abdominal tenderness.   Musculoskeletal:         General: No swelling or tenderness. Normal range of motion.      Cervical back: Normal range of motion. No rigidity. No muscular tenderness.      Right lower leg: No edema.      Left lower leg: No edema.      Comments: tenderness on palpation both axilla,  no enlarged lymph node palpated.  No submandibular, cervical, anterior cervical chain lymphadenopathy   Skin:     General: Skin is warm and dry.      Capillary Refill: Capillary refill takes less than 2 seconds.      Findings: No erythema or rash.   Neurological:      General: No focal deficit present.      Mental Status: She is alert and oriented to person,  place, and time.      Motor: No weakness.      Gait: Gait normal.         Fluids    Intake/Output Summary (Last 24 hours) at 5/7/2021 1258  Last data filed at 5/7/2021 0900  Gross per 24 hour   Intake 1340 ml   Output --   Net 1340 ml       Laboratory  Recent Labs     05/06/21  1222 05/07/21  0455   WBC 10.3 7.4   RBC 3.75* 3.21*   HEMOGLOBIN 12.1 9.8*   HEMATOCRIT 37.7 31.6*   .5* 98.4*   MCH 32.3 30.5   MCHC 32.1* 31.0*   RDW 55.1* 52.5*   PLATELETCT 309 251   MPV 11.4 11.0     Recent Labs     05/06/21  1222 05/07/21  0455   SODIUM 135 137   POTASSIUM 4.1 4.0   CHLORIDE 107 113*   CO2 15* 16*   GLUCOSE 99 94   BUN 10 8   CREATININE 0.70 0.69   CALCIUM 8.8 8.2*                   Imaging  CT-ABDOMEN-PELVIS WITH   Final Result      1.  No acute inflammatory or obstructive process.      2.  No bowel obstruction. Moderate amount of stool is present throughout the colon.      3.  Stable hepatomegaly.      4.  Bilateral nephrolithiasis is stable. No residual hydronephrosis. No ureterolithiasis.      5.  Cholecystectomy.      CT-CHEST (THORAX) W/O    (Results Pending)   US-EXTREMITY NON VASCULAR BILATERAL    (Results Pending)        Assessment/Plan  * Nausea & vomiting- (present on admission)  Assessment & Plan  Patient presenting with intractable nausea and vomiting of been going on since January of this year.  This is chronic in nature, progressively worse since January 2021, noted 30lb unintentional weight loss   Has a history of duodenal ulceration rupture with repair about a year ago.  Has been followed outpatient extensively by GI and PCP for this chronic history and multiple recent testing including EGD/CT scan/blood work has all been unremarkable.  Of note patient follows up with [facility], Dr. Graf, and has had multiple diagnostic evaluations and blood work without any definitive findings. States she had an EGD performed about a year ago that came back unremarkable.  On this admission patient had a CT of  the abdomen and pelvis with IV contrast with no remarkable findings.    Hx of Hiatal hernia, noted on previous CT imaging and endoscopies. Abdominal pain worsens with eating or drinking fluid. No prior surgical evaluation    Unintentional weight loss  Assessment & Plan  Enlarged right axillary lymph nodes noted on MRI from 10/20/2020  Bilateral tender axillary lymph nodes on exam  Patient reports 30 pound weight loss since January 2021  Ultrasound of bilateral axilla ordered  CT chest ordered given history of smoking    Patient is up-to-date with mammography, Pap smear, and colonoscopy which have all been normal to date.    Hiatal hernia  Assessment & Plan  Noted on previous CT imaging and endoscopies  Abdominal pain worsens with eating or drinking fluid  No prior surgical evaluation    Enlarged lymph nodes  Assessment & Plan  Enlarged right axillary lymph nodes noted on MRI from 10/20/2020  Bilateral tender axillary lymph nodes on exam  Patient reports 30 pound weight loss since January 2021  Ultrasound of bilateral axilla ordered    Dysthymia  Assessment & Plan  Continue Paxil and Seroquel    History of rheumatoid arthritis- (present on admission)  Assessment & Plan  Continue home medication-Enbrel.  Outpatient follow-up.    Protein calorie malnutrition (HCC)- (present on admission)  Assessment & Plan  BMI 18.2.  Patient with chronic abdominal pain and intractable nausea and vomiting.  Dietitian consulted.    Chronic pain- (present on admission)  Assessment & Plan  History of chronic abdominal pain syndrome  Patient follows up with pain management    History of perforation of posterior duodenum pancreatitis abscess- (present on admission)  Assessment & Plan  Continue home medication-Pepcid.  Status post ex lap with DU repair 10/4 by Dr. Chaudhari    Tobacco dependence- (present on admission)  Assessment & Plan  1 PPD x 35 years  Nicotine patch in place  Smoking cessation encouraged     No prior CT imaging, last chest  x-ray is normal  Given history of smoking, and weight loss, CT chest without contrast ordered       VTE prophylaxis: SCDs, Lovenox

## 2021-05-07 NOTE — HOSPITAL COURSE
This is a 51-year-old female with past medical history of depression, anxiety, rheumatoid arthritis, tobacco dependence, COPD, chronic pain syndrome (follows with pain management) and history of duodenal ulcer rupture in 10/2019 status post repair, history of hiatal hernia who presented on 05/06 with acute on chronic abdominal pain associated with unintentional weight loss since January 2021.  Patient went to [place] the day prior to admission and had a CT without contrast which came back unremarkable.    Of note patient follows up with [facility], Dr. Graf, and has had multiple diagnostic evaluations and blood work without any definitive findings. States she had an EGD performed about a year ago that came back unremarkable.    On this admission patient had a CT of the abdomen and pelvis with IV contrast with no remarkable findings.    Patient reports she has had a 30 pound unintentional weight loss since January 2021.  She admits to worsening abdominal pain with food and liquids.  She has had multiple imaging and endoscopies prior with no etiology for the pain.  She does have a hiatal hernia which has not been evaluated by surgery.    She reports a questionable mass noted on MRI of her shoulder.  Upon review it was noted she had enlarged right-sided axillary lymph nodes. On exam she is tender to palpation alongside both axilla.      Patient is a current smoker. CT chest without contrast noted benign 3 mm nodule in the right lower lobe, stable since prior. Emphysema. Interval enlargement of bilateral axillary nodes, which measure up to 1.9 x 1.4 cm on the right and 1.3 x 1.3 cm on the left.

## 2021-05-08 LAB
ANION GAP SERPL CALC-SCNC: 11 MMOL/L (ref 7–16)
BUN SERPL-MCNC: 6 MG/DL (ref 8–22)
CALCIUM SERPL-MCNC: 8.6 MG/DL (ref 8.4–10.2)
CHLORIDE SERPL-SCNC: 110 MMOL/L (ref 96–112)
CO2 SERPL-SCNC: 17 MMOL/L (ref 20–33)
CREAT SERPL-MCNC: 0.69 MG/DL (ref 0.5–1.4)
ERYTHROCYTE [DISTWIDTH] IN BLOOD BY AUTOMATED COUNT: 52.2 FL (ref 35.9–50)
GLUCOSE SERPL-MCNC: 136 MG/DL (ref 65–99)
HCT VFR BLD AUTO: 32.3 % (ref 37–47)
HGB BLD-MCNC: 10.1 G/DL (ref 12–16)
MCH RBC QN AUTO: 30.8 PG (ref 27–33)
MCHC RBC AUTO-ENTMCNC: 31.3 G/DL (ref 33.6–35)
MCV RBC AUTO: 98.5 FL (ref 81.4–97.8)
PLATELET # BLD AUTO: 271 K/UL (ref 164–446)
PMV BLD AUTO: 11.2 FL (ref 9–12.9)
POTASSIUM SERPL-SCNC: 3.7 MMOL/L (ref 3.6–5.5)
RBC # BLD AUTO: 3.28 M/UL (ref 4.2–5.4)
SODIUM SERPL-SCNC: 138 MMOL/L (ref 135–145)
WBC # BLD AUTO: 7.3 K/UL (ref 4.8–10.8)

## 2021-05-08 PROCEDURE — 85027 COMPLETE CBC AUTOMATED: CPT

## 2021-05-08 PROCEDURE — 80048 BASIC METABOLIC PNL TOTAL CA: CPT

## 2021-05-08 PROCEDURE — G0378 HOSPITAL OBSERVATION PER HR: HCPCS

## 2021-05-08 PROCEDURE — 36415 COLL VENOUS BLD VENIPUNCTURE: CPT

## 2021-05-08 PROCEDURE — 96376 TX/PRO/DX INJ SAME DRUG ADON: CPT

## 2021-05-08 PROCEDURE — 99225 PR SUBSEQUENT OBSERVATION CARE,LEVEL II: CPT | Performed by: GENERAL PRACTICE

## 2021-05-08 PROCEDURE — A9270 NON-COVERED ITEM OR SERVICE: HCPCS | Performed by: STUDENT IN AN ORGANIZED HEALTH CARE EDUCATION/TRAINING PROGRAM

## 2021-05-08 PROCEDURE — 700102 HCHG RX REV CODE 250 W/ 637 OVERRIDE(OP): Performed by: STUDENT IN AN ORGANIZED HEALTH CARE EDUCATION/TRAINING PROGRAM

## 2021-05-08 PROCEDURE — 700111 HCHG RX REV CODE 636 W/ 250 OVERRIDE (IP): Performed by: STUDENT IN AN ORGANIZED HEALTH CARE EDUCATION/TRAINING PROGRAM

## 2021-05-08 RX ADMIN — ONDANSETRON 4 MG: 4 TABLET, ORALLY DISINTEGRATING ORAL at 14:32

## 2021-05-08 RX ADMIN — PROMETHAZINE HYDROCHLORIDE 25 MG: 25 TABLET ORAL at 17:29

## 2021-05-08 RX ADMIN — FAMOTIDINE 20 MG: 20 TABLET ORAL at 05:51

## 2021-05-08 RX ADMIN — OXYCODONE HYDROCHLORIDE 5 MG: 5 TABLET ORAL at 03:47

## 2021-05-08 RX ADMIN — PAROXETINE HYDROCHLORIDE 60 MG: 20 TABLET, FILM COATED ORAL at 17:29

## 2021-05-08 RX ADMIN — QUETIAPINE FUMARATE 100 MG: 100 TABLET ORAL at 17:28

## 2021-05-08 RX ADMIN — ONDANSETRON 4 MG: 4 TABLET, ORALLY DISINTEGRATING ORAL at 08:42

## 2021-05-08 RX ADMIN — PROCHLORPERAZINE EDISYLATE 10 MG: 5 INJECTION INTRAMUSCULAR; INTRAVENOUS at 09:42

## 2021-05-08 RX ADMIN — ACETAMINOPHEN 650 MG: 325 TABLET, FILM COATED ORAL at 08:42

## 2021-05-08 RX ADMIN — OXYCODONE HYDROCHLORIDE 5 MG: 5 TABLET ORAL at 23:59

## 2021-05-08 RX ADMIN — ACETAMINOPHEN 650 MG: 325 TABLET, FILM COATED ORAL at 14:31

## 2021-05-08 RX ADMIN — NICOTINE 14 MG: 14 PATCH TRANSDERMAL at 05:51

## 2021-05-08 RX ADMIN — QUETIAPINE FUMARATE 25 MG: 25 TABLET ORAL at 08:42

## 2021-05-08 RX ADMIN — PROMETHAZINE HYDROCHLORIDE 25 MG: 25 TABLET ORAL at 11:15

## 2021-05-08 RX ADMIN — OXYCODONE HYDROCHLORIDE 5 MG: 5 TABLET ORAL at 08:42

## 2021-05-08 RX ADMIN — OXYCODONE HYDROCHLORIDE 5 MG: 5 TABLET ORAL at 14:31

## 2021-05-08 RX ADMIN — FAMOTIDINE 20 MG: 20 TABLET ORAL at 17:28

## 2021-05-08 RX ADMIN — OXYCODONE HYDROCHLORIDE 5 MG: 5 TABLET ORAL at 18:52

## 2021-05-08 ASSESSMENT — PAIN DESCRIPTION - PAIN TYPE
TYPE: ACUTE PAIN

## 2021-05-08 ASSESSMENT — FIBROSIS 4 INDEX: FIB4 SCORE: 0.6

## 2021-05-08 ASSESSMENT — ENCOUNTER SYMPTOMS
ABDOMINAL PAIN: 1
WEAKNESS: 1
WEIGHT LOSS: 1

## 2021-05-08 NOTE — CARE PLAN
Admit w/ acute on chronic N/V, abdominal pain and recent weight loss.   US showing swollen lymph nodes, plan for biopsy of lymph nodes 5/10.  Pain controlled w/ PRN oxy and tylenol.  Difficulty controlling nausea today- required zofran, compazine and phenergen.  Hypotensive, all other VSS on RA.  Adequate PO intake.  Plan for biopsy 5/10 and then discharge home after.    Problem: Safety  Goal: Will remain free from injury  Outcome: PROGRESSING AS EXPECTED     Problem: Infection  Goal: Will remain free from infection  Outcome: PROGRESSING AS EXPECTED     Problem: Bowel/Gastric:  Goal: Normal bowel function is maintained or improved  Outcome: PROGRESSING AS EXPECTED     Problem: Discharge Barriers/Planning  Goal: Patient's continuum of care needs will be met  Outcome: PROGRESSING AS EXPECTED

## 2021-05-08 NOTE — PROGRESS NOTES
[facility] Medicine Daily Progress Note    Date of Service  5/8/2021    Chief Complaint  51 y.o. female admitted 5/6/2021 with abdominal pain, weight loss    Hospital Course  This is a 51-year-old female with past medical history of depression, anxiety, rheumatoid arthritis, tobacco dependence, COPD, chronic pain syndrome (follows with pain management) and history of duodenal ulcer rupture in 10/2019 status post repair, history of hiatal hernia who presented on 05/06 with acute on chronic abdominal pain associated with unintentional weight loss since January 2021.  Patient went to [place] the day prior to admission and had a CT without contrast which came back unremarkable.    Of note patient follows up with [facility], Dr. Graf, and has had multiple diagnostic evaluations and blood work without any definitive findings. States she had an EGD performed about a year ago that came back unremarkable.    On this admission patient had a CT of the abdomen and pelvis with IV contrast with no remarkable findings.    Patient reports she has had a 30 pound unintentional weight loss since January 2021.  She admits to worsening abdominal pain with food and liquids.  She has had multiple imaging and endoscopies prior with no etiology for the pain.  She does have a hiatal hernia which has not been evaluated by surgery.    She reports a questionable mass noted on MRI of her shoulder.  Upon review it was noted she had enlarged right-sided axillary lymph nodes. On exam she is tender to palpation alongside both axilla.      Patient is a current smoker. CT chest without contrast noted benign 3 mm nodule in the right lower lobe, stable since prior. Emphysema. Interval enlargement of bilateral axillary nodes, which measure up to 1.9 x 1.4 cm on the right and 1.3 x 1.3 cm on the left.      Interval Problem Update  Patient was seen and examined at bedside.  Patient reports her nausea is subsiding she is tolerating her bite sized meals.      Patient is for biopsy of lymph nodes 5/10. Anticipate discharge after biopsy.    Consultants/Specialty  None    Code Status  Full Code    Disposition  Anticipate discharge after biopsy 5/10.    Review of Systems  Review of Systems   Constitutional: Positive for malaise/fatigue and weight loss.   Gastrointestinal: Positive for abdominal pain.   Neurological: Positive for weakness.        Physical Exam  Temp:  [36.7 °C (98.1 °F)-37 °C (98.6 °F)] 36.9 °C (98.5 °F)  Pulse:  [] 85  Resp:  [17-18] 17  BP: ()/(55-73) 97/60  SpO2:  [95 %-98 %] 95 %    Physical Exam  Vitals and nursing note reviewed.   Constitutional:       General: She is not in acute distress.     Appearance: Normal appearance. She is cachectic.   HENT:      Head: Normocephalic and atraumatic.      Mouth/Throat:      Mouth: Mucous membranes are moist.      Pharynx: No oropharyngeal exudate.   Eyes:      Extraocular Movements: Extraocular movements intact.      Pupils: Pupils are equal, round, and reactive to light.   Cardiovascular:      Rate and Rhythm: Normal rate and regular rhythm.      Pulses: Normal pulses.      Heart sounds: No murmur. No friction rub. No gallop.    Pulmonary:      Effort: Pulmonary effort is normal. No respiratory distress.      Breath sounds: No wheezing, rhonchi or rales.   Abdominal:      General: Bowel sounds are normal. There is no distension.      Palpations: Abdomen is soft. There is no mass.      Tenderness: There is abdominal tenderness.   Musculoskeletal:         General: No swelling or tenderness. Normal range of motion.      Cervical back: Normal range of motion. No rigidity. No muscular tenderness.      Right lower leg: No edema.      Left lower leg: No edema.      Comments: tenderness on palpation both axilla,  no enlarged lymph node palpated.  No submandibular, cervical, anterior cervical chain lymphadenopathy   Skin:     General: Skin is warm and dry.      Capillary Refill: Capillary refill takes  less than 2 seconds.      Findings: No erythema or rash.   Neurological:      General: No focal deficit present.      Mental Status: She is alert and oriented to person, place, and time.      Motor: No weakness.      Gait: Gait normal.         Fluids    Intake/Output Summary (Last 24 hours) at 5/8/2021 1246  Last data filed at 5/8/2021 0800  Gross per 24 hour   Intake 600 ml   Output --   Net 600 ml       Laboratory  Recent Labs     05/06/21  1222 05/07/21  0455 05/08/21  0437   WBC 10.3 7.4 7.3   RBC 3.75* 3.21* 3.28*   HEMOGLOBIN 12.1 9.8* 10.1*   HEMATOCRIT 37.7 31.6* 32.3*   .5* 98.4* 98.5*   MCH 32.3 30.5 30.8   MCHC 32.1* 31.0* 31.3*   RDW 55.1* 52.5* 52.2*   PLATELETCT 309 251 271   MPV 11.4 11.0 11.2     Recent Labs     05/06/21 1222 05/07/21 0455 05/08/21 0437   SODIUM 135 137 138   POTASSIUM 4.1 4.0 3.7   CHLORIDE 107 113* 110   CO2 15* 16* 17*   GLUCOSE 99 94 136*   BUN 10 8 6*   CREATININE 0.70 0.69 0.69   CALCIUM 8.8 8.2* 8.6                   Imaging  US-EXTREMITY NON VASCULAR BILATERAL   Final Result      Enlarged bilateral axillary lymph nodes.      CT-CHEST (THORAX) W/O   Final Result         1. No acute abnormality in the chest.   2. Benign 3 mm nodule in the right lower lobe, stable since prior.   3. Emphysema.   4. Nonspecific interval enlargement of bilateral axillary nodes. No breast mass lesions are detected. They are statistically reactive rather than neoplastic, however, please consider mammogram.   5. Nonobstructing bilateral nephrolithiasis.      CT-ABDOMEN-PELVIS WITH   Final Result      1.  No acute inflammatory or obstructive process.      2.  No bowel obstruction. Moderate amount of stool is present throughout the colon.      3.  Stable hepatomegaly.      4.  Bilateral nephrolithiasis is stable. No residual hydronephrosis. No ureterolithiasis.      5.  Cholecystectomy.      IR-CONSULT AND TREAT    (Results Pending)        Assessment/Plan  * Nausea & vomiting- (present on  admission)  Assessment & Plan  Patient presenting with intractable nausea and vomiting of been going on since January of this year.  This is chronic in nature, progressively worse since January 2021, noted 30lb unintentional weight loss   Has a history of duodenal ulceration rupture with repair about a year ago.  Has been followed outpatient extensively by GI and PCP for this chronic history and multiple recent testing including EGD/CT scan/blood work has all been unremarkable.  Of note patient follows up with [facility], Dr. Graf, and has had multiple diagnostic evaluations and blood work without any definitive findings. States she had an EGD performed about a year ago that came back unremarkable.  On this admission patient had a CT of the abdomen and pelvis with IV contrast with no remarkable findings.    Hx of Hiatal hernia, noted on previous CT imaging and endoscopies. Abdominal pain worsens with eating or drinking fluid. No prior surgical evaluation    Unintentional weight loss  Assessment & Plan  Enlarged right axillary lymph nodes noted on MRI from 10/20/2020  Bilateral tender axillary lymph nodes on exam  Patient reports 30 pound weight loss since January 2021  Patient has hiatal hernia (no prior evaluation by surgery)  Patient is up-to-date with mammography, Pap smear, and colonoscopy which have all been normal to date.    CT chest without contrast noted benign 3 mm nodule in the right lower lobe, stable since prior. Emphysema. Interval enlargement of bilateral axillary nodes, which measure up to 1.9 x 1.4 cm on the right and 1.3 x 1.3 cm on the left.  Ultrasound of bilateral axilla noted multiple bilateral enlarged axillary lymph nodes which measure up to 2.5 cm in greatest dimension on the right and 2.0 cm on the left.    Hiatal hernia  Assessment & Plan  Noted on previous CT imaging and endoscopies  Abdominal pain worsens with eating or drinking fluid  No prior surgical evaluation    Enlarged lymph  nodes  Assessment & Plan  Enlarged right axillary lymph nodes noted on MRI from 10/20/2020  Bilateral tender axillary lymph nodes on exam  Patient reports 30 pound weight loss since January 2021  CT chest without contrast noted interval enlargement of bilateral axillary nodes, which measure up to 1.9 x 1.4 cm on the right and 1.3 x 1.3 cm on the left.  Ultrasound of bilateral axilla noted multiple bilateral enlarged axillary lymph nodes which measure up to 2.5 cm in greatest dimension on the right and 2.0 cm on the left.  Patient for biopsy 5/10    Dysthymia  Assessment & Plan  Continue Paxil and Seroquel    History of rheumatoid arthritis- (present on admission)  Assessment & Plan  Continue home medication-Enbrel.  Outpatient follow-up.    Protein calorie malnutrition (HCC)- (present on admission)  Assessment & Plan  BMI 18.2.  Patient with chronic abdominal pain and intractable nausea and vomiting.  Dietitian consulted.    Chronic pain- (present on admission)  Assessment & Plan  History of chronic abdominal pain syndrome  Patient follows up with pain management    History of perforation of posterior duodenum pancreatitis abscess- (present on admission)  Assessment & Plan  Continue home medication-Pepcid.  Status post ex lap with DU repair 10/4 by Dr. Chaudhari    Tobacco dependence- (present on admission)  Assessment & Plan  1 PPD x 35 years  Nicotine patch in place  Smoking cessation encouraged     No prior CT imaging, last chest x-ray is normal  Given history of smoking, and weight loss  CT chest without contrast noted benign 3 mm nodule in the right lower lobe, stable since prior. Emphysema.        VTE prophylaxis: SCDs, Lovenox

## 2021-05-09 PROCEDURE — 99225 PR SUBSEQUENT OBSERVATION CARE,LEVEL II: CPT | Performed by: GENERAL PRACTICE

## 2021-05-09 PROCEDURE — 700111 HCHG RX REV CODE 636 W/ 250 OVERRIDE (IP): Performed by: STUDENT IN AN ORGANIZED HEALTH CARE EDUCATION/TRAINING PROGRAM

## 2021-05-09 PROCEDURE — 700102 HCHG RX REV CODE 250 W/ 637 OVERRIDE(OP): Performed by: STUDENT IN AN ORGANIZED HEALTH CARE EDUCATION/TRAINING PROGRAM

## 2021-05-09 PROCEDURE — G0378 HOSPITAL OBSERVATION PER HR: HCPCS

## 2021-05-09 PROCEDURE — A9270 NON-COVERED ITEM OR SERVICE: HCPCS | Performed by: STUDENT IN AN ORGANIZED HEALTH CARE EDUCATION/TRAINING PROGRAM

## 2021-05-09 RX ADMIN — OXYCODONE HYDROCHLORIDE 5 MG: 5 TABLET ORAL at 08:32

## 2021-05-09 RX ADMIN — OXYCODONE HYDROCHLORIDE 5 MG: 5 TABLET ORAL at 04:23

## 2021-05-09 RX ADMIN — QUETIAPINE FUMARATE 100 MG: 100 TABLET ORAL at 17:46

## 2021-05-09 RX ADMIN — PROMETHAZINE HYDROCHLORIDE 25 MG: 25 TABLET ORAL at 08:32

## 2021-05-09 RX ADMIN — OXYCODONE HYDROCHLORIDE 5 MG: 5 TABLET ORAL at 13:17

## 2021-05-09 RX ADMIN — NICOTINE 14 MG: 14 PATCH TRANSDERMAL at 05:52

## 2021-05-09 RX ADMIN — FAMOTIDINE 20 MG: 20 TABLET ORAL at 17:48

## 2021-05-09 RX ADMIN — OXYCODONE HYDROCHLORIDE 5 MG: 5 TABLET ORAL at 20:42

## 2021-05-09 RX ADMIN — PAROXETINE HYDROCHLORIDE 60 MG: 20 TABLET, FILM COATED ORAL at 17:46

## 2021-05-09 RX ADMIN — ACETAMINOPHEN 650 MG: 325 TABLET, FILM COATED ORAL at 13:17

## 2021-05-09 RX ADMIN — OXYCODONE HYDROCHLORIDE 5 MG: 5 TABLET ORAL at 17:46

## 2021-05-09 RX ADMIN — FAMOTIDINE 20 MG: 20 TABLET ORAL at 05:53

## 2021-05-09 RX ADMIN — ONDANSETRON 4 MG: 4 TABLET, ORALLY DISINTEGRATING ORAL at 13:17

## 2021-05-09 ASSESSMENT — PAIN DESCRIPTION - PAIN TYPE
TYPE: ACUTE PAIN
TYPE: ACUTE PAIN

## 2021-05-09 ASSESSMENT — ENCOUNTER SYMPTOMS
WEIGHT LOSS: 1
WEAKNESS: 1
ABDOMINAL PAIN: 1

## 2021-05-09 NOTE — PROGRESS NOTES
[facility] Medicine Daily Progress Note    Date of Service  5/9/2021    Chief Complaint  51 y.o. female admitted 5/6/2021 with abdominal pain, weight loss    Hospital Course  This is a 51-year-old female with past medical history of depression, anxiety, rheumatoid arthritis, tobacco dependence, COPD, chronic pain syndrome (follows with pain management) and history of duodenal ulcer rupture in 10/2019 status post repair, history of hiatal hernia who presented on 05/06 with acute on chronic abdominal pain associated with unintentional weight loss since January 2021.  Patient went to [place] the day prior to admission and had a CT without contrast which came back unremarkable.    Of note patient follows up with [facility], Dr. Graf, and has had multiple diagnostic evaluations and blood work without any definitive findings. States she had an EGD performed about a year ago that came back unremarkable.    On this admission patient had a CT of the abdomen and pelvis with IV contrast with no remarkable findings.    Patient reports she has had a 30 pound unintentional weight loss since January 2021.  She admits to worsening abdominal pain with food and liquids.  She has had multiple imaging and endoscopies prior with no etiology for the pain.  She does have a hiatal hernia which has not been evaluated by surgery.    She reports a questionable mass noted on MRI of her shoulder.  Upon review it was noted she had enlarged right-sided axillary lymph nodes. On exam she is tender to palpation alongside both axilla.      Patient is a current smoker. CT chest without contrast noted benign 3 mm nodule in the right lower lobe, stable since prior. Emphysema. Interval enlargement of bilateral axillary nodes, which measure up to 1.9 x 1.4 cm on the right and 1.3 x 1.3 cm on the left.      Interval Problem Update  Patient was seen and examined at bedside.  Patient reports her nausea is subsiding we have advanced her diet to regular.      Patient is for biopsy of lymph nodes 5/10. Anticipate discharge after biopsy. PCP will receive results.    Consultants/Specialty  None    Code Status  Full Code    Disposition  Anticipate discharge after biopsy 5/10.    Review of Systems  Review of Systems   Constitutional: Positive for malaise/fatigue and weight loss.   Gastrointestinal: Positive for abdominal pain.   Neurological: Positive for weakness.        Physical Exam  Temp:  [36.7 °C (98 °F)-36.9 °C (98.4 °F)] 36.9 °C (98.4 °F)  Pulse:  [80-93] 93  Resp:  [16-18] 17  BP: ()/(61-71) 103/71  SpO2:  [94 %-99 %] 94 %    Physical Exam  Vitals and nursing note reviewed.   Constitutional:       General: She is not in acute distress.     Appearance: Normal appearance. She is cachectic.   HENT:      Head: Normocephalic and atraumatic.      Mouth/Throat:      Mouth: Mucous membranes are moist.      Pharynx: No oropharyngeal exudate.   Eyes:      Extraocular Movements: Extraocular movements intact.      Pupils: Pupils are equal, round, and reactive to light.   Cardiovascular:      Rate and Rhythm: Normal rate and regular rhythm.      Pulses: Normal pulses.      Heart sounds: No murmur. No friction rub. No gallop.    Pulmonary:      Effort: Pulmonary effort is normal. No respiratory distress.      Breath sounds: No wheezing, rhonchi or rales.   Abdominal:      General: Bowel sounds are normal. There is no distension.      Palpations: Abdomen is soft. There is no mass.      Tenderness: There is abdominal tenderness.   Musculoskeletal:         General: No swelling or tenderness. Normal range of motion.      Cervical back: Normal range of motion. No rigidity. No muscular tenderness.      Right lower leg: No edema.      Left lower leg: No edema.      Comments: tenderness on palpation both axilla,  no enlarged lymph node palpated.  No submandibular, cervical, anterior cervical chain lymphadenopathy   Skin:     General: Skin is warm and dry.      Capillary Refill:  Capillary refill takes less than 2 seconds.      Findings: No erythema or rash.   Neurological:      General: No focal deficit present.      Mental Status: She is alert and oriented to person, place, and time.      Motor: No weakness.      Gait: Gait normal.         Fluids    Intake/Output Summary (Last 24 hours) at 5/9/2021 1509  Last data filed at 5/9/2021 1002  Gross per 24 hour   Intake 560 ml   Output --   Net 560 ml       Laboratory  Recent Labs     05/07/21  0455 05/08/21 0437   WBC 7.4 7.3   RBC 3.21* 3.28*   HEMOGLOBIN 9.8* 10.1*   HEMATOCRIT 31.6* 32.3*   MCV 98.4* 98.5*   MCH 30.5 30.8   MCHC 31.0* 31.3*   RDW 52.5* 52.2*   PLATELETCT 251 271   MPV 11.0 11.2     Recent Labs     05/07/21 0455 05/08/21 0437   SODIUM 137 138   POTASSIUM 4.0 3.7   CHLORIDE 113* 110   CO2 16* 17*   GLUCOSE 94 136*   BUN 8 6*   CREATININE 0.69 0.69   CALCIUM 8.2* 8.6                   Imaging  US-EXTREMITY NON VASCULAR BILATERAL   Final Result      Enlarged bilateral axillary lymph nodes.      CT-CHEST (THORAX) W/O   Final Result         1. No acute abnormality in the chest.   2. Benign 3 mm nodule in the right lower lobe, stable since prior.   3. Emphysema.   4. Nonspecific interval enlargement of bilateral axillary nodes. No breast mass lesions are detected. They are statistically reactive rather than neoplastic, however, please consider mammogram.   5. Nonobstructing bilateral nephrolithiasis.      CT-ABDOMEN-PELVIS WITH   Final Result      1.  No acute inflammatory or obstructive process.      2.  No bowel obstruction. Moderate amount of stool is present throughout the colon.      3.  Stable hepatomegaly.      4.  Bilateral nephrolithiasis is stable. No residual hydronephrosis. No ureterolithiasis.      5.  Cholecystectomy.      IR-CONSULT AND TREAT    (Results Pending)        Assessment/Plan  * Nausea & vomiting- (present on admission)  Assessment & Plan  Patient presenting with intractable nausea and vomiting of been  going on since January of this year.  This is chronic in nature, progressively worse since January 2021, noted 30lb unintentional weight loss   Has a history of duodenal ulceration rupture with repair about a year ago.  Has been followed outpatient extensively by GI and PCP for this chronic history and multiple recent testing including EGD/CT scan/blood work has all been unremarkable.  Of note patient follows up with [facility], Dr. Graf, and has had multiple diagnostic evaluations and blood work without any definitive findings. States she had an EGD performed about a year ago that came back unremarkable.  On this admission patient had a CT of the abdomen and pelvis with IV contrast with no remarkable findings.    Hx of Hiatal hernia, noted on previous CT imaging and endoscopies. Abdominal pain worsens with eating or drinking fluid. No prior surgical evaluation    Unintentional weight loss  Assessment & Plan  Enlarged right axillary lymph nodes noted on MRI from 10/20/2020  Bilateral tender axillary lymph nodes on exam  Patient reports 30 pound weight loss since January 2021  Patient has hiatal hernia (no prior evaluation by surgery)  Patient is up-to-date with mammography, Pap smear, and colonoscopy which have all been normal to date.    CT chest without contrast noted benign 3 mm nodule in the right lower lobe, stable since prior. Emphysema. Interval enlargement of bilateral axillary nodes, which measure up to 1.9 x 1.4 cm on the right and 1.3 x 1.3 cm on the left.  Ultrasound of bilateral axilla noted multiple bilateral enlarged axillary lymph nodes which measure up to 2.5 cm in greatest dimension on the right and 2.0 cm on the left.    Hiatal hernia  Assessment & Plan  Noted on previous CT imaging and endoscopies  Abdominal pain worsens with eating or drinking fluid  No prior surgical evaluation    Enlarged lymph nodes  Assessment & Plan  Enlarged right axillary lymph nodes noted on MRI from 10/20/2020  Bilateral  tender axillary lymph nodes on exam  Patient reports 30 pound weight loss since January 2021  CT chest without contrast noted interval enlargement of bilateral axillary nodes, which measure up to 1.9 x 1.4 cm on the right and 1.3 x 1.3 cm on the left.  Ultrasound of bilateral axilla noted multiple bilateral enlarged axillary lymph nodes which measure up to 2.5 cm in greatest dimension on the right and 2.0 cm on the left.  Patient for biopsy 5/10    Dysthymia  Assessment & Plan  Continue Paxil and Seroquel    History of rheumatoid arthritis- (present on admission)  Assessment & Plan  Continue home medication-Enbrel.  Outpatient follow-up.    Protein calorie malnutrition (HCC)- (present on admission)  Assessment & Plan  BMI 18.2.  Patient with chronic abdominal pain and intractable nausea and vomiting.  Dietitian consulted.    Chronic pain- (present on admission)  Assessment & Plan  History of chronic abdominal pain syndrome  Patient follows up with pain management    History of perforation of posterior duodenum pancreatitis abscess- (present on admission)  Assessment & Plan  Continue home medication-Pepcid.  Status post ex lap with DU repair 10/4 by Dr. Chaudhari    Tobacco dependence- (present on admission)  Assessment & Plan  1 PPD x 35 years  Nicotine patch in place  Smoking cessation encouraged     No prior CT imaging, last chest x-ray is normal  Given history of smoking, and weight loss  CT chest without contrast noted benign 3 mm nodule in the right lower lobe, stable since prior. Emphysema.        VTE prophylaxis: SCDs, Lovenox

## 2021-05-09 NOTE — CARE PLAN
Admit w/ acute on chronic N/V, abdominal pain and recent weight loss.   US showing swollen lymph nodes, plan for biopsy of axillary lymph nodes 5/10.   Pain controlled w/ PRN oxy and tylenol.  Nausea controlled w/ zofran and phenergen.  Hypotensive, all other VSS on RA.  Adequate PO intake.  Plan for biopsy 5/10 and then discharge home after    Problem: Communication  Goal: The ability to communicate needs accurately and effectively will improve  Outcome: PROGRESSING AS EXPECTED     Problem: Safety  Goal: Will remain free from injury  Outcome: PROGRESSING AS EXPECTED     Problem: Bowel/Gastric:  Goal: Normal bowel function is maintained or improved  Outcome: PROGRESSING AS EXPECTED     Problem: Knowledge Deficit  Goal: Knowledge of disease process/condition, treatment plan, diagnostic tests, and medications will improve  Outcome: PROGRESSING AS EXPECTED

## 2021-05-10 ENCOUNTER — PATIENT OUTREACH (OUTPATIENT)
Dept: HEALTH INFORMATION MANAGEMENT | Facility: OTHER | Age: 51
End: 2021-05-10

## 2021-05-10 VITALS
OXYGEN SATURATION: 97 % | RESPIRATION RATE: 18 BRPM | BODY MASS INDEX: 18.61 KG/M2 | HEIGHT: 63 IN | TEMPERATURE: 98 F | HEART RATE: 78 BPM | DIASTOLIC BLOOD PRESSURE: 55 MMHG | WEIGHT: 105 LBS | SYSTOLIC BLOOD PRESSURE: 105 MMHG

## 2021-05-10 PROCEDURE — G0378 HOSPITAL OBSERVATION PER HR: HCPCS

## 2021-05-10 PROCEDURE — 700102 HCHG RX REV CODE 250 W/ 637 OVERRIDE(OP): Performed by: STUDENT IN AN ORGANIZED HEALTH CARE EDUCATION/TRAINING PROGRAM

## 2021-05-10 PROCEDURE — 96376 TX/PRO/DX INJ SAME DRUG ADON: CPT

## 2021-05-10 PROCEDURE — A9270 NON-COVERED ITEM OR SERVICE: HCPCS | Performed by: STUDENT IN AN ORGANIZED HEALTH CARE EDUCATION/TRAINING PROGRAM

## 2021-05-10 PROCEDURE — 700111 HCHG RX REV CODE 636 W/ 250 OVERRIDE (IP): Performed by: STUDENT IN AN ORGANIZED HEALTH CARE EDUCATION/TRAINING PROGRAM

## 2021-05-10 PROCEDURE — 99217 PR OBSERVATION CARE DISCHARGE: CPT | Performed by: GENERAL PRACTICE

## 2021-05-10 RX ORDER — FAMOTIDINE 20 MG/1
20 TABLET, FILM COATED ORAL 2 TIMES DAILY
Qty: 60 TABLET | Refills: 0 | Status: SHIPPED | OUTPATIENT
Start: 2021-05-10 | End: 2021-06-09

## 2021-05-10 RX ADMIN — OXYCODONE HYDROCHLORIDE 5 MG: 5 TABLET ORAL at 05:59

## 2021-05-10 RX ADMIN — OXYCODONE HYDROCHLORIDE 5 MG: 5 TABLET ORAL at 09:55

## 2021-05-10 RX ADMIN — OXYCODONE HYDROCHLORIDE 5 MG: 5 TABLET ORAL at 01:17

## 2021-05-10 RX ADMIN — ONDANSETRON 4 MG: 4 TABLET, ORALLY DISINTEGRATING ORAL at 09:55

## 2021-05-10 RX ADMIN — ONDANSETRON 4 MG: 2 INJECTION INTRAMUSCULAR; INTRAVENOUS at 05:59

## 2021-05-10 RX ADMIN — ACETAMINOPHEN 650 MG: 325 TABLET, FILM COATED ORAL at 09:55

## 2021-05-10 RX ADMIN — NICOTINE 14 MG: 14 PATCH TRANSDERMAL at 05:59

## 2021-05-10 ASSESSMENT — PAIN DESCRIPTION - PAIN TYPE
TYPE: ACUTE PAIN

## 2021-05-10 NOTE — DISCHARGE SUMMARY
Discharge Summary    CHIEF COMPLAINT ON ADMISSION  Chief Complaint   Patient presents with   • Abdominal Pain     epigastric increased since sunday    • N/V     increased since sunday   • Loss of Appetite     for months       Reason for Admission  Abdominal Pain      Admission Date  5/6/2021    CODE STATUS  Full Code    HPI & HOSPITAL COURSE  This is a 51-year-old female with past medical history of depression, anxiety, rheumatoid arthritis, tobacco dependence, COPD, chronic pain syndrome (follows with pain management) and history of duodenal ulcer rupture in 10/2019 status post repair, history of hiatal hernia who presented on 05/06 with acute on chronic abdominal pain associated with unintentional weight loss since January 2021.  Patient went to [facility] the day prior to admission and had a CT without contrast which came back unremarkable.    Of note patient follows up with [facility], Dr. Graf, and has had multiple diagnostic evaluations and blood work without any definitive findings. States she had an EGD performed about a year ago that came back unremarkable.    On this admission patient had a CT of the abdomen and pelvis with IV contrast with no remarkable findings.    Patient reports she has had a 30 pound unintentional weight loss since January 2021.  She admits to worsening abdominal pain with food and liquids.  She has had multiple imaging and endoscopies prior with no etiology for the pain.  She does have a hiatal hernia which has not been evaluated by surgery.    She reports a questionable mass noted on MRI of her shoulder.  Upon review it was noted she had enlarged right-sided axillary lymph nodes. On exam she is tender to palpation alongside both axilla.      Patient is a current smoker. CT chest without contrast noted benign 3 mm nodule in the right lower lobe, stable since prior. Emphysema. Interval enlargement of bilateral axillary nodes, which measure up to 1.9 x 1.4 cm on the right and 1.3 x 1.3  cm on the left.      Therefore, she is discharged in good and stable condition to home with close outpatient follow-up.    The patient recovered much more quickly than anticipated on admission.    Discharge Date  5/10/2021    FOLLOW UP ITEMS POST DISCHARGE  IR on 5/13  Primary care physician for biopsy  General surgery for hiatal hernia  Pain management    DISCHARGE DIAGNOSES  Principal Problem (Resolved):    Nausea & vomiting POA: Yes  Active Problems:    Protein calorie malnutrition (HCC) POA: Yes    History of rheumatoid arthritis POA: Yes    Dysthymia POA: Unknown    Enlarged lymph nodes POA: Unknown    Hiatal hernia POA: Unknown    Unintentional weight loss POA: Unknown    Tobacco dependence POA: Yes    History of perforation of posterior duodenum pancreatitis abscess POA: Yes    Chronic pain POA: Yes      FOLLOW UP  Future Appointments   Date Time Provider Department Center   5/13/2021  8:00 AM [facility] IR US OUTPATIENT 1 [facility]     Fidencio Christopher D.O.  [address]  [facility] [address]  [phone]            MEDICATIONS ON DISCHARGE     Medication List      START taking these medications      Instructions   famotidine 20 MG Tabs  Commonly known as: PEPCID   Take 1 tablet by mouth 2 times a day for 30 days.  Dose: 20 mg        CONTINUE taking these medications      Instructions   albuterol 108 (90 Base) MCG/ACT Aers inhalation aerosol   Inhale 2 Puffs by mouth every four hours as needed for Shortness of Breath.  Dose: 2 Puff     Enbrel 50 MG/ML Sosy  Generic drug: Etanercept   INJECT 50 MG ONCE WEEKLY UNDERNEATH THE SKIN  FOR 28 DAYS     Naloxone 4 MG/0.1ML Liqd  Commonly known as: NARCAN   One spray in one nostril for overdose and call 911.     ondansetron 8 MG Tbdp  Commonly known as: ZOFRAN ODT   Take 8 mg by mouth every 8 hours as needed.  Dose: 8 mg     oxyCODONE-acetaminophen 7.5-325 MG per tablet  Start taking on: May 11, 2021  Commonly known as: PERCOCET   Take 1 tablet by mouth  every four hours as needed for up to 30 days.  Dose: 1 tablet     PARoxetine 30 MG Tabs  Commonly known as: PAXIL   Take 60 mg by mouth every evening.  Dose: 60 mg     * QUEtiapine 100 MG Tabs  Commonly known as: Seroquel   Take 100 mg by mouth every evening.  Dose: 100 mg     * QUEtiapine 25 MG Tabs  Commonly known as: Seroquel   Take 25 mg by mouth 2 times a day as needed for Anxiety.  Dose: 25 mg         * This list has 2 medication(s) that are the same as other medications prescribed for you. Read the directions carefully, and ask your doctor or other care provider to review them with you.                Allergies  Allergies   Allergen Reactions   • Penicillins Anaphylaxis and Hives     Tolerates cephalosporins; reports throat swelling with PCN   • Aripiprazole Nausea     Spasms, shaking     • Nitrous Oxide Vomiting       DIET  Orders Placed This Encounter   Procedures   • Diet Order Diet: Regular     Standing Status:   Standing     Number of Occurrences:   1     Order Specific Question:   Diet:     Answer:   Regular [1]       ACTIVITY  As tolerated.  Weight bearing as tolerated    CONSULTATIONS  None    PROCEDURES  None    LABORATORY  Lab Results   Component Value Date    SODIUM 138 05/08/2021    POTASSIUM 3.7 05/08/2021    CHLORIDE 110 05/08/2021    CO2 17 (L) 05/08/2021    GLUCOSE 136 (H) 05/08/2021    BUN 6 (L) 05/08/2021    CREATININE 0.69 05/08/2021    CREATININE 0.7 11/29/2008        Lab Results   Component Value Date    WBC 7.3 05/08/2021    HEMOGLOBIN 10.1 (L) 05/08/2021    HEMATOCRIT 32.3 (L) 05/08/2021    PLATELETCT 271 05/08/2021      US-EXTREMITY NON VASCULAR BILATERAL   Final Result      Enlarged bilateral axillary lymph nodes.      CT-CHEST (THORAX) W/O   Final Result         1. No acute abnormality in the chest.   2. Benign 3 mm nodule in the right lower lobe, stable since prior.   3. Emphysema.   4. Nonspecific interval enlargement of bilateral axillary nodes. No breast mass lesions are  detected. They are statistically reactive rather than neoplastic, however, please consider mammogram.   5. Nonobstructing bilateral nephrolithiasis.      CT-ABDOMEN-PELVIS WITH   Final Result      1.  No acute inflammatory or obstructive process.      2.  No bowel obstruction. Moderate amount of stool is present throughout the colon.      3.  Stable hepatomegaly.      4.  Bilateral nephrolithiasis is stable. No residual hydronephrosis. No ureterolithiasis.      5.  Cholecystectomy.      IR-CONSULT AND TREAT    (Results Pending)   IR-NEEDLE BX-LYMPH NODE    (Results Pending)     Total time of the discharge process exceeds 45 minutes.

## 2021-05-10 NOTE — DISCHARGE INSTRUCTIONS
You will be called regarding the timing of your biopsy on 5/13 Thursday. Please answer the phone call so you know when to follow up.  Prior to the biopsy please do not take any Aleve, Motrin, aspirin.  If you are in pain continue with your oxycodone and or Tylenol as needed.  Once the biopsy is performed, the results will be sent to your primary care physician, they will go over the results with you.  If this is negative, I recommend that you have your primary care physician refer you to a surgeon to deal with this hiatal hernia, as this is likely causing the discomfort that you feel after you eat.  Please follow-up with your pain management physician who will continue to adjust and refill your pain medications as needed.  Please take care and be well.

## 2021-05-10 NOTE — PROGRESS NOTES
5/10- DELIA Morales met with pt bedside prior to her discharge. Pt declined all CCM services/resources at this time. Pt would like to continue to manage their care. CCM contact info left with pt bedside and encouraged to reach out if needed.

## 2021-05-13 ENCOUNTER — HOSPITAL ENCOUNTER (OUTPATIENT)
Dept: RADIOLOGY | Facility: MEDICAL CENTER | Age: 51
End: 2021-05-13
Attending: GENERAL PRACTICE
Payer: MEDICAID

## 2021-05-13 DIAGNOSIS — R63.4 UNINTENTIONAL WEIGHT LOSS: ICD-10-CM

## 2021-05-13 DIAGNOSIS — R59.9 ENLARGED LYMPH NODES: ICD-10-CM

## 2021-05-13 PROCEDURE — 88341 IMHCHEM/IMCYTCHM EA ADD ANTB: CPT | Mod: 91

## 2021-05-13 PROCEDURE — 76942 ECHO GUIDE FOR BIOPSY: CPT

## 2021-05-13 PROCEDURE — 88342 IMHCHEM/IMCYTCHM 1ST ANTB: CPT

## 2021-05-13 PROCEDURE — 20206 BIOPSY MUSCLE PERQ NEEDLE: CPT

## 2021-05-13 PROCEDURE — 88305 TISSUE EXAM BY PATHOLOGIST: CPT

## 2021-05-13 PROCEDURE — 88307 TISSUE EXAM BY PATHOLOGIST: CPT

## 2021-05-13 NOTE — PROGRESS NOTES
[facility] Nursing Note:    Procedure RN: Sherry GALINDO: Paige    US guided right axillary lymph node biopsy  done by Dr. Han; right axillary  access site; cores x 6 (5 in Formalin, 1 in RPMI) obtained and sent to pathology lab; pt tolerated the procedure well; NO sedation NO H&P required; all questions and concerns answered prior to being d/c; patient provided with appropriate education for procedure; pt d/c home.

## 2021-05-14 LAB — PATHOLOGY CONSULT NOTE: NORMAL

## 2021-05-30 ENCOUNTER — HOSPITAL ENCOUNTER (EMERGENCY)
Facility: MEDICAL CENTER | Age: 51
End: 2021-05-30
Attending: EMERGENCY MEDICINE
Payer: MEDICAID

## 2021-05-30 ENCOUNTER — APPOINTMENT (OUTPATIENT)
Dept: RADIOLOGY | Facility: MEDICAL CENTER | Age: 51
End: 2021-05-30
Attending: EMERGENCY MEDICINE
Payer: MEDICAID

## 2021-05-30 VITALS
BODY MASS INDEX: 17.89 KG/M2 | WEIGHT: 101 LBS | DIASTOLIC BLOOD PRESSURE: 67 MMHG | HEIGHT: 63 IN | OXYGEN SATURATION: 96 % | TEMPERATURE: 96.7 F | RESPIRATION RATE: 30 BRPM | SYSTOLIC BLOOD PRESSURE: 97 MMHG | HEART RATE: 79 BPM

## 2021-05-30 DIAGNOSIS — R11.2 NON-INTRACTABLE VOMITING WITH NAUSEA, UNSPECIFIED VOMITING TYPE: ICD-10-CM

## 2021-05-30 DIAGNOSIS — E86.0 DEHYDRATION: ICD-10-CM

## 2021-05-30 DIAGNOSIS — R42 VERTIGO: ICD-10-CM

## 2021-05-30 DIAGNOSIS — E87.6 HYPOKALEMIA: ICD-10-CM

## 2021-05-30 DIAGNOSIS — R10.13 EPIGASTRIC ABDOMINAL PAIN: ICD-10-CM

## 2021-05-30 DIAGNOSIS — K44.9 HIATAL HERNIA: ICD-10-CM

## 2021-05-30 LAB
ALBUMIN SERPL BCP-MCNC: 3.2 G/DL (ref 3.2–4.9)
ALBUMIN/GLOB SERPL: 0.9 G/DL
ALP SERPL-CCNC: 122 U/L (ref 30–99)
ALT SERPL-CCNC: <5 U/L (ref 2–50)
ANION GAP SERPL CALC-SCNC: 11 MMOL/L (ref 7–16)
ANISOCYTOSIS BLD QL SMEAR: ABNORMAL
APPEARANCE UR: CLEAR
AST SERPL-CCNC: 5 U/L (ref 12–45)
BACTERIA #/AREA URNS HPF: ABNORMAL /HPF
BASOPHILS # BLD AUTO: 0.9 % (ref 0–1.8)
BASOPHILS # BLD: 0.05 K/UL (ref 0–0.12)
BILIRUB SERPL-MCNC: <0.2 MG/DL (ref 0.1–1.5)
BILIRUB UR QL STRIP.AUTO: NEGATIVE
BUN SERPL-MCNC: 8 MG/DL (ref 8–22)
CALCIUM SERPL-MCNC: 8.7 MG/DL (ref 8.5–10.5)
CHLORIDE SERPL-SCNC: 111 MMOL/L (ref 96–112)
CO2 SERPL-SCNC: 17 MMOL/L (ref 20–33)
COLOR UR: YELLOW
CREAT SERPL-MCNC: 0.73 MG/DL (ref 0.5–1.4)
EKG IMPRESSION: NORMAL
EOSINOPHIL # BLD AUTO: 0.22 K/UL (ref 0–0.51)
EOSINOPHIL NFR BLD: 3.6 % (ref 0–6.9)
EPI CELLS #/AREA URNS HPF: ABNORMAL /HPF
ERYTHROCYTE [DISTWIDTH] IN BLOOD BY AUTOMATED COUNT: 50.5 FL (ref 35.9–50)
GLOBULIN SER CALC-MCNC: 3.7 G/DL (ref 1.9–3.5)
GLUCOSE SERPL-MCNC: 105 MG/DL (ref 65–99)
GLUCOSE UR STRIP.AUTO-MCNC: NEGATIVE MG/DL
HCT VFR BLD AUTO: 35.6 % (ref 37–47)
HGB BLD-MCNC: 11.2 G/DL (ref 12–16)
HYALINE CASTS #/AREA URNS LPF: ABNORMAL /LPF
KETONES UR STRIP.AUTO-MCNC: NEGATIVE MG/DL
LACTATE BLD-SCNC: 1.2 MMOL/L (ref 0.5–2)
LEUKOCYTE ESTERASE UR QL STRIP.AUTO: ABNORMAL
LYMPHOCYTES # BLD AUTO: 2.18 K/UL (ref 1–4.8)
LYMPHOCYTES NFR BLD: 35.7 % (ref 22–41)
MACROCYTES BLD QL SMEAR: ABNORMAL
MANUAL DIFF BLD: NORMAL
MCH RBC QN AUTO: 29.9 PG (ref 27–33)
MCHC RBC AUTO-ENTMCNC: 31.5 G/DL (ref 33.6–35)
MCV RBC AUTO: 95.2 FL (ref 81.4–97.8)
MICRO URNS: ABNORMAL
MICROCYTES BLD QL SMEAR: ABNORMAL
MONOCYTES # BLD AUTO: 0.27 K/UL (ref 0–0.85)
MONOCYTES NFR BLD AUTO: 4.5 % (ref 0–13.4)
MORPHOLOGY BLD-IMP: NORMAL
NEUTROPHILS # BLD AUTO: 3.37 K/UL (ref 2–7.15)
NEUTROPHILS NFR BLD: 55.3 % (ref 44–72)
NITRITE UR QL STRIP.AUTO: NEGATIVE
NRBC # BLD AUTO: 0 K/UL
NRBC BLD-RTO: 0 /100 WBC
PH UR STRIP.AUTO: 7 [PH] (ref 5–8)
PLATELET # BLD AUTO: 364 K/UL (ref 164–446)
PLATELET BLD QL SMEAR: NORMAL
PMV BLD AUTO: 10.3 FL (ref 9–12.9)
POLYCHROMASIA BLD QL SMEAR: NORMAL
POTASSIUM SERPL-SCNC: 3.2 MMOL/L (ref 3.6–5.5)
PROT SERPL-MCNC: 6.9 G/DL (ref 6–8.2)
PROT UR QL STRIP: NEGATIVE MG/DL
RBC # BLD AUTO: 3.74 M/UL (ref 4.2–5.4)
RBC # URNS HPF: ABNORMAL /HPF
RBC BLD AUTO: PRESENT
RBC UR QL AUTO: NEGATIVE
SODIUM SERPL-SCNC: 139 MMOL/L (ref 135–145)
SP GR UR STRIP.AUTO: 1
UROBILINOGEN UR STRIP.AUTO-MCNC: 0.2 MG/DL
WBC # BLD AUTO: 6.1 K/UL (ref 4.8–10.8)
WBC #/AREA URNS HPF: ABNORMAL /HPF

## 2021-05-30 PROCEDURE — 71045 X-RAY EXAM CHEST 1 VIEW: CPT

## 2021-05-30 PROCEDURE — 99285 EMERGENCY DEPT VISIT HI MDM: CPT

## 2021-05-30 PROCEDURE — 96374 THER/PROPH/DIAG INJ IV PUSH: CPT

## 2021-05-30 PROCEDURE — 85027 COMPLETE CBC AUTOMATED: CPT

## 2021-05-30 PROCEDURE — 81001 URINALYSIS AUTO W/SCOPE: CPT

## 2021-05-30 PROCEDURE — 700111 HCHG RX REV CODE 636 W/ 250 OVERRIDE (IP): Performed by: EMERGENCY MEDICINE

## 2021-05-30 PROCEDURE — 83605 ASSAY OF LACTIC ACID: CPT

## 2021-05-30 PROCEDURE — A9270 NON-COVERED ITEM OR SERVICE: HCPCS | Performed by: EMERGENCY MEDICINE

## 2021-05-30 PROCEDURE — 87040 BLOOD CULTURE FOR BACTERIA: CPT | Mod: 91

## 2021-05-30 PROCEDURE — 74019 RADEX ABDOMEN 2 VIEWS: CPT

## 2021-05-30 PROCEDURE — 93005 ELECTROCARDIOGRAM TRACING: CPT | Performed by: EMERGENCY MEDICINE

## 2021-05-30 PROCEDURE — 700102 HCHG RX REV CODE 250 W/ 637 OVERRIDE(OP): Performed by: EMERGENCY MEDICINE

## 2021-05-30 PROCEDURE — 87086 URINE CULTURE/COLONY COUNT: CPT

## 2021-05-30 PROCEDURE — 80053 COMPREHEN METABOLIC PANEL: CPT

## 2021-05-30 PROCEDURE — 85007 BL SMEAR W/DIFF WBC COUNT: CPT

## 2021-05-30 PROCEDURE — 96375 TX/PRO/DX INJ NEW DRUG ADDON: CPT

## 2021-05-30 PROCEDURE — 700105 HCHG RX REV CODE 258: Performed by: EMERGENCY MEDICINE

## 2021-05-30 RX ORDER — METOCLOPRAMIDE HYDROCHLORIDE 5 MG/ML
10 INJECTION INTRAMUSCULAR; INTRAVENOUS ONCE
Status: COMPLETED | OUTPATIENT
Start: 2021-05-30 | End: 2021-05-30

## 2021-05-30 RX ORDER — METOCLOPRAMIDE 10 MG/1
10 TABLET ORAL 4 TIMES DAILY
Qty: 120 TABLET | Refills: 0 | Status: ON HOLD | OUTPATIENT
Start: 2021-05-30 | End: 2022-02-21

## 2021-05-30 RX ORDER — MECLIZINE HYDROCHLORIDE 25 MG/1
50 TABLET ORAL ONCE
Status: COMPLETED | OUTPATIENT
Start: 2021-05-30 | End: 2021-05-30

## 2021-05-30 RX ORDER — POTASSIUM CHLORIDE 20 MEQ/1
40 TABLET, EXTENDED RELEASE ORAL ONCE
Status: COMPLETED | OUTPATIENT
Start: 2021-05-30 | End: 2021-05-30

## 2021-05-30 RX ORDER — MORPHINE SULFATE 4 MG/ML
4 INJECTION, SOLUTION INTRAMUSCULAR; INTRAVENOUS ONCE
Status: COMPLETED | OUTPATIENT
Start: 2021-05-30 | End: 2021-05-30

## 2021-05-30 RX ORDER — SODIUM CHLORIDE 9 MG/ML
1000 INJECTION, SOLUTION INTRAVENOUS ONCE
Status: COMPLETED | OUTPATIENT
Start: 2021-05-30 | End: 2021-05-30

## 2021-05-30 RX ORDER — MECLIZINE HYDROCHLORIDE 25 MG/1
25 TABLET ORAL 4 TIMES DAILY
Qty: 120 TABLET | Refills: 0 | Status: ON HOLD | OUTPATIENT
Start: 2021-05-30 | End: 2022-02-21

## 2021-05-30 RX ORDER — DIPHENHYDRAMINE HYDROCHLORIDE 50 MG/ML
50 INJECTION INTRAMUSCULAR; INTRAVENOUS ONCE
Status: COMPLETED | OUTPATIENT
Start: 2021-05-30 | End: 2021-05-30

## 2021-05-30 RX ADMIN — SODIUM CHLORIDE 1000 ML: 9 INJECTION, SOLUTION INTRAVENOUS at 19:47

## 2021-05-30 RX ADMIN — MORPHINE SULFATE 4 MG: 4 INJECTION INTRAVENOUS at 21:01

## 2021-05-30 RX ADMIN — MECLIZINE HYDROCHLORIDE 50 MG: 25 TABLET ORAL at 19:42

## 2021-05-30 RX ADMIN — DIPHENHYDRAMINE HYDROCHLORIDE 50 MG: 50 INJECTION INTRAMUSCULAR; INTRAVENOUS at 20:24

## 2021-05-30 RX ADMIN — METOCLOPRAMIDE 10 MG: 5 INJECTION, SOLUTION INTRAMUSCULAR; INTRAVENOUS at 19:43

## 2021-05-30 RX ADMIN — POTASSIUM CHLORIDE 40 MEQ: 1500 TABLET, EXTENDED RELEASE ORAL at 22:01

## 2021-05-30 ASSESSMENT — LIFESTYLE VARIABLES
HOW MANY TIMES IN THE PAST YEAR HAVE YOU HAD 5 OR MORE DRINKS IN A DAY: 0
TOTAL SCORE: 0
HAVE YOU EVER FELT YOU SHOULD CUT DOWN ON YOUR DRINKING: NO
TOTAL SCORE: 0
DO YOU DRINK ALCOHOL: NO
EVER FELT BAD OR GUILTY ABOUT YOUR DRINKING: NO
CONSUMPTION TOTAL: NEGATIVE
AVERAGE NUMBER OF DAYS PER WEEK YOU HAVE A DRINK CONTAINING ALCOHOL: 0
HAVE PEOPLE ANNOYED YOU BY CRITICIZING YOUR DRINKING: NO
TOTAL SCORE: 0
EVER HAD A DRINK FIRST THING IN THE MORNING TO STEADY YOUR NERVES TO GET RID OF A HANGOVER: NO
ON A TYPICAL DAY WHEN YOU DRINK ALCOHOL HOW MANY DRINKS DO YOU HAVE: 0

## 2021-05-30 ASSESSMENT — PAIN DESCRIPTION - PAIN TYPE: TYPE: ACUTE PAIN

## 2021-05-30 ASSESSMENT — FIBROSIS 4 INDEX: FIB4 SCORE: 0.6

## 2021-05-31 NOTE — DISCHARGE INSTRUCTIONS
Eat small frequent meals with high calories even if you have to do Ensure to keep some weight on.  Recommend quick follow-up with your surgeon regarding your breast biopsy results.  Eat foods high in potassium.  Stay hydrated with at least 1/2 to 1 gallon of water or water products daily.  Take the medications as directed and follow-up with your primary care doctor this week for recheck.

## 2021-05-31 NOTE — ED NOTES
US IV placed by LEANNE Newton. Gave pain medications per MAR. Pt resting in bed, call light in reach.

## 2021-05-31 NOTE — ED NOTES
Pt BIB EMS from home.  Pt c/o dizziness on and off.  Recent left breast and lymph node bx, positive for CA  - lymphoma vs breast Ca.  Pt also c/o epigastric pain from hernia.  Pt sepsis score 4.  ERP to see.

## 2021-05-31 NOTE — ED NOTES
Discharge teaching and paperwork provided regarding hypokalemia and hernia   and all questions/concerns answered. VSS, pain assessment stable and PIV removed. Given information regarding reglan and meclizine Rx. Patient discharged to the care of self and  and wheeled out of the ED.

## 2021-05-31 NOTE — ED PROVIDER NOTES
ED Provider Note     Scribed for Chaya Murphy D.O. by Ezra Mcgarry. 5/30/2021, 7:07 PM.     Primary care provider: Fidencio Christopher D.O.  Means of arrival: EMS         History obtained from: Patient  History limited by: None    CHIEF COMPLAINT  Chief Complaint   Patient presents with   • Dizziness   • Epigastric Pain   • Blood Pressure Problem       HPI  Mary Martinez is a 51 y.o. female who presents to the emergency Department via EMS for evaluation of worsening dizziness onset earlier today. She has been having this intermittently but today it became more constant. When she closes her eyes she states that the room is still spinning. Additionally, she has a hiatal hernia that has not yet been operated on but this is causing severe epigastric pain. The pain is described as cramping. She has been vomiting as well. Dr. Hurd is the surgeon who is going to repair the hernia. She has not taken any medications for her dizziness. On arrival she was also found to be hypotensive to 80/55, which has been going on throughout the day. No diarrhea.    REVIEW OF SYSTEMS  Pertinent positives include dizziness, epigastric pain, vomiting, and hypotension. Pertinent negatives include no diarrhea.   See HPI for further details. All other systems are negative.    PAST MEDICAL HISTORY  Past Medical History:   Diagnosis Date   • Arthritis     Rheumatoid -follows with rheumatologist. Has several fractures due to RA.   • ASTHMA     inhalers prn   • Bowel habit changes     4/24/20-constipation and diarrhea.   • Bronchitis last approx 2018   • Chronic pain 04/24/2020    Due to RA   • Dental disorder     no teeth upper-does not wear her denture. Broken teeth on bottom.   • Heart burn     taking famotidine   • Hiatus hernia syndrome    • History of pancreatitis    • Indigestion     taking famotidine   • Pain     everywhere   • Pneumonia 10/2019   • Psychiatric problem     anxiety and depression   • Rheumatoid arthritis(714.0) 2003        FAMILY HISTORY  History reviewed. No pertinent family history.    SOCIAL HISTORY  Social History     Tobacco Use   • Smoking status: Current Every Day Smoker     Packs/day: 1.00     Years: 30.00     Pack years: 30.00     Types: Cigarettes   • Smokeless tobacco: Never Used   • Tobacco comment: 4/24/20-now smoking approx 1/2 PPD.   Vaping Use   • Vaping Use: Never used   Substance Use Topics   • Alcohol use: Never   • Drug use: Never      Social History     Substance and Sexual Activity   Drug Use Never       SURGICAL HISTORY  Past Surgical History:   Procedure Laterality Date   • PB ENDOSCOPIC US EXAM, ESOPH  4/29/2020    Procedure: EGD, WITH ENDOSCOPIC US;  Surgeon: Jorge Brooke M.D.;  Location: [facility];  Service: Gastroenterology   • GASTROSCOPY-ENDO  4/29/2020    Procedure: GASTROSCOPY;  Surgeon: Jorge Brooke M.D.;  Location: [facility];  Service: Gastroenterology   • EGD WITH ASP/BX  4/29/2020    Procedure: EGD, WITH ASPIRATION BIOPSY - POSS FNA;  Surgeon: Jorge Brooke M.D.;  Location: [facility];  Service: Gastroenterology   • PB EXPLORATORY OF ABDOMEN N/A 10/4/2019    Procedure: LAPAROTOMY, EXPLORATORY AND REPAIR OF DUODENAL PERFORATION;  Surgeon: James Dumont M.D.;  Location: [facility];  Service: General   • COLONOSCOPY  2018    with upper endoscopy   • FINGER ARTHROPLASTY Right 6/5/2017    Procedure: FINGER ARTHROPLASTY - LONG, RING AND SMALL VOLAR PLATE;  Surgeon: Lobo Rosen M.D.;  Location: SURGERY SAME DAY [facility];  Service:    • FINGER AMPUTATION  6/5/2017    Procedure: FINGER AMPUTATION - LONG, RING AND SMALL AT THE PROXIMAL INTERPHALANGEAL JOINT;  Surgeon: Lobo Rosen M.D.;  Location: SURGERY SAME DAY [facility];  Service:    • FINGER ARTHROPLASTY Right 4/17/2017    Procedure: FINGER ARTHROPLASTY ;  Surgeon: Lobo Rosen M.D.;  Location: SURGERY SAME DAY [facility];   Service:    • FINGER AMPUTATION Right 9/14/2016    Procedure: FINGER AMPUTATION INDEX;  Surgeon: Lobo Rosen M.D.;  Location: SURGERY SAME DAY [facility];  Service:    • IRRIGATION & DEBRIDEMENT ORTHO Right 9/11/2016    Procedure: IRRIGATION & DEBRIDEMENT ORTHO - right index finger;  Surgeon: Madhu Rosen M.D.;  Location: SURGERY [facility];  Service:    • PIP ARTHRODESIS Right 8/29/2016    Procedure: RE-DO INDEX FINGER PROXIMAL INTERPHALANGEAL ARTHRODESIS;  Surgeon: Lobo Rosen M.D.;  Location: SURGERY SAME DAY [facility];  Service:    • BONE GRAFT Right 8/29/2016    Procedure: BONE GRAFT - DISTAL RADIUS ;  Surgeon: Lobo Rosen M.D.;  Location: SURGERY SAME DAY [facility];  Service:    • ARTHRODESIS Right 5/6/2016    Procedure: ARTHRODESIS INDEX FINGER PROXIMAL INTERPHALANGEAL;  Surgeon: Lobo Rosen M.D.;  Location: SURGERY SAME DAY [facility];  Service:    • FOOT RECONSTRUCTION RHEUMATIC Left 7/29/2015    Procedure: FOOT RECONSTRUCTION RHEUMATIC;  Surgeon: Heriberto Alves M.D.;  Location: SURGERY [facility];  Service:    • TOE FUSION Right 5/27/2015    Procedure: TOE FUSION 1ST METATARSALPHALANGEAL JOINT;  Surgeon: Heriberto Alves M.D.;  Location: SURGERY [facility];  Service:    • BONE SPUR EXCISION  5/27/2015    Procedure: BONE SPUR EXCISION METATARSAL HEAD 2-3;  Surgeon: Heriberto Alves M.D.;  Location: SURGERY [facility];  Service:    • HAMMERTOE CORRECTION  5/27/2015    Procedure: HAMMERTOE CORRECTION AND SOFT TISSUE RE-ALINGMENT 2-3 ;  Surgeon: Heriberto Alves M.D.;  Location: SURGERY [facility];  Service:    • DENTAL EXTRACTION(S)  2014    all of upper teeth   • ABDOMINAL ABSCESS DRAINAGE  9/27/2011    Performed by VERO WRIGHT at [facility]   • EMANUEL BY LAPAROSCOPY  9/27/2011    Performed by VERO WRIGHT at [facility]   • APPENDECTOMY  2011    Found out it had ruptured prior to [facility] surgery  "  • REPEAT C SECTION  2008   • REPEAT C SECTION  2005   • REPEAT C SECTION  1999   • PRIMARY C SECTION  1991   • TONSILLECTOMY  1982   • WRIST EXPLORATION Left 1980's    fractured wrist-no hardware       CURRENT MEDICATIONS  Current Outpatient Medications   Medication Instructions   • albuterol (VENTOLIN OR PROVENTIL) 108 (90 BASE) MCG/ACT AERS inhalation aerosol 2 Puffs, Inhalation, EVERY 4 HOURS PRN   • ENBREL 50 MG/ML Solution Prefilled Syringe INJECT 50 MG ONCE WEEKLY UNDERNEATH THE SKIN  FOR 28 DAYS   • famotidine (PEPCID) 20 mg, Oral, 2 TIMES DAILY   • Naloxone (NARCAN) 4 MG/0.1ML Liquid One spray in one nostril for overdose and call 911.   • ondansetron (ZOFRAN ODT) 8 mg, Oral, EVERY 8 HOURS PRN   • oxyCODONE-acetaminophen (PERCOCET) 7.5-325 MG per tablet 1 tablet, Oral, EVERY 4 HOURS PRN   • PARoxetine (PAXIL) 60 mg, Oral, EVERY EVENING   • QUEtiapine (SEROQUEL) 100 mg, Oral, EVERY EVENING   • QUEtiapine (SEROQUEL) 25 mg, Oral, 2 TIMES DAILY PRN       ALLERGIES  Allergies   Allergen Reactions   • Penicillins Anaphylaxis and Hives     Tolerates cephalosporins; reports throat swelling with PCN   • Aripiprazole Nausea     Spasms, shaking     • Nitrous Oxide Vomiting       PHYSICAL EXAM  VITAL SIGNS: BP (!) 80/55   Pulse (!) 118   Temp 38 °C (100.4 °F) (Temporal)   Resp 20   Ht 1.6 m (5' 3\")   Wt 45.8 kg (101 lb)   LMP 09/21/2011   SpO2 97%   BMI 17.89 kg/m²     Constitutional: Very thin, petite woman. Pale and ill appearing  HENT: Normocephalic, atraumatic.  Dry oral mucosa.  Eyes: PERRL, EOMI   Cardiovascular: Normal heart rate and Regular rhythm. No murmur  Thorax & Lungs: Clear and equal breath sounds with good excursion. No respiratory distress, no rhonchi, wheezing or rales.  Abdomen: Bowel sounds normal in all four quadrants. Soft,epigastric tenderness, no rebound , guarding, palpable masses. No flank tenderness  Skin: Warm, Dry,  No rashes.   Back: No cervical, thoracic, or lumbosacral " tenderness.  Extremities: Peripheral pulses 4/4 No edema, No tenderness, right hand with multiple absent fingers  Neurologic: Alert & oriented x 3, Normal motor function, Normal sensory function  Psychiatric: Affect normal, Judgment normal, Mood normal.      DIAGNOSTICS/PROCEDURES    LABS  Results for orders placed or performed during the hospital encounter of 05/30/21   Lactic acid (lactate)   Result Value Ref Range    Lactic Acid 1.2 0.5 - 2.0 mmol/L   CBC WITH DIFFERENTIAL   Result Value Ref Range    WBC 6.1 4.8 - 10.8 K/uL    RBC 3.74 (L) 4.20 - 5.40 M/uL    Hemoglobin 11.2 (L) 12.0 - 16.0 g/dL    Hematocrit 35.6 (L) 37.0 - 47.0 %    MCV 95.2 81.4 - 97.8 fL    MCH 29.9 27.0 - 33.0 pg    MCHC 31.5 (L) 33.6 - 35.0 g/dL    RDW 50.5 (H) 35.9 - 50.0 fL    Platelet Count 364 164 - 446 K/uL    MPV 10.3 9.0 - 12.9 fL    Neutrophils-Polys 55.30 44.00 - 72.00 %    Lymphocytes 35.70 22.00 - 41.00 %    Monocytes 4.50 0.00 - 13.40 %    Eosinophils 3.60 0.00 - 6.90 %    Basophils 0.90 0.00 - 1.80 %    Nucleated RBC 0.00 /100 WBC    Neutrophils (Absolute) 3.37 2.00 - 7.15 K/uL    Lymphs (Absolute) 2.18 1.00 - 4.80 K/uL    Monos (Absolute) 0.27 0.00 - 0.85 K/uL    Eos (Absolute) 0.22 0.00 - 0.51 K/uL    Baso (Absolute) 0.05 0.00 - 0.12 K/uL    NRBC (Absolute) 0.00 K/uL    Anisocytosis 1+     Macrocytosis 1+     Microcytosis 1+    COMP METABOLIC PANEL   Result Value Ref Range    Sodium 139 135 - 145 mmol/L    Potassium 3.2 (L) 3.6 - 5.5 mmol/L    Chloride 111 96 - 112 mmol/L    Co2 17 (L) 20 - 33 mmol/L    Anion Gap 11.0 7.0 - 16.0    Glucose 105 (H) 65 - 99 mg/dL    Bun 8 8 - 22 mg/dL    Creatinine 0.73 0.50 - 1.40 mg/dL    Calcium 8.7 8.5 - 10.5 mg/dL    AST(SGOT) 5 (L) 12 - 45 U/L    ALT(SGPT) <5 2 - 50 U/L    Alkaline Phosphatase 122 (H) 30 - 99 U/L    Total Bilirubin <0.2 0.1 - 1.5 mg/dL    Albumin 3.2 3.2 - 4.9 g/dL    Total Protein 6.9 6.0 - 8.2 g/dL    Globulin 3.7 (H) 1.9 - 3.5 g/dL    A-G Ratio 0.9 g/dL   URINALYSIS     Specimen: Urine   Result Value Ref Range    Color Yellow     Character Clear     Specific Gravity 1.001 <1.035    Ph 7.0 5.0 - 8.0    Glucose Negative Negative mg/dL    Ketones Negative Negative mg/dL    Protein Negative Negative mg/dL    Bilirubin Negative Negative    Urobilinogen, Urine 0.2 Negative    Nitrite Negative Negative    Leukocyte Esterase Trace (A) Negative    Occult Blood Negative Negative    Micro Urine Req Microscopic    URINE MICROSCOPIC (W/UA)   Result Value Ref Range    WBC 2-5 /hpf    RBC 0-2 /hpf    Bacteria Few (A) None /hpf    Epithelial Cells Few /hpf    Hyaline Cast 0-2 /lpf   ESTIMATED GFR   Result Value Ref Range    GFR If African American >60 >60 mL/min/1.73 m 2    GFR If Non African American >60 >60 mL/min/1.73 m 2   DIFFERENTIAL MANUAL   Result Value Ref Range    Manual Diff Status PERFORMED    PERIPHERAL SMEAR REVIEW   Result Value Ref Range    Peripheral Smear Review see below    PLATELET ESTIMATE   Result Value Ref Range    Plt Estimation Normal    MORPHOLOGY   Result Value Ref Range    RBC Morphology Present     Polychromia 1+    EKG (NOW)   Result Value Ref Range    Report       [facility] Emergency Dept.    Test Date:  2021  Pt Name:    STEFFANIE ELLISON                Department: ER  MRN:        [id number]                      Room:       [facility]  Gender:     Female                       Technician: [id number]  :        1970                   Requested By:ER TRIAGE PROTOCOL  Order #:    [id number]                    Reading MD:    Measurements  Intervals                                Axis  Rate:       109                          P:          85  KY:         171                          QRS:        88  QRSD:       101                          T:          28  QT:         332  QTc:        448    Interpretive Statements  Sinus tachycardia  Right atrial enlargement  Borderline low voltage, extremity leads  Compared to ECG 2021 12:31:22  Sinus rhythm no  longer present       Labs reviewed by me    EKG  12 lead EKG interpreted by myself, see above.    RADIOLOGY/PROCEDURES  [id number] VIEWS   Final Result      1.  No evidence of bowel obstruction.      2.  No change in small renal calcifications.                  DX-CHEST-PORTABLE (1 VIEW)   Final Result      No acute cardiopulmonary abnormality.        Results and radiologist interpretation reviewed by me.     COURSE & MEDICAL DECISION MAKING  Pertinent Labs & Imaging studies r         m lkevaluate. Patient will be treated with Reglan, Benadryl, Morphine, Antivert, and IV fluids for her symptoms. Differential diagnoses include, but are not limited to, vertigo, dehydration, electrolyte abnormality    Patient's labs were unremarkable.  She has a normal white blood cell count with a stable H&H.  Her potassium is a little bit low at 3.2 and she was given potassium replacement for this.  CO2 is low at 17 as well she was hydrated and given the Antivert and is feeling remarkably better.    9:27 PM - Reviewed lab results. Treated with potassium chloride. Discussed discharge instructions and return precautions with the patient and they were cleared for discharge. Patient was given the opportunity to ask any further questions. She is comfortable with discharge at this time.      HYDRATION: Based on the patient's presentation of Acute Vomiting and Hypotension the patient was given IV fluids. IV Hydration was used because oral hydration was not adequate alone. Upon recheck following hydration, the patient was improved.  She is instructed to eat foods high in potassium, take the Antivert as needed and the Reglan for her hiatal hernia and follow-up with her general surgeon for her breast biopsy results.      The patient will return for new or worsening symptoms and is stable at the time of discharge.    The patient is referred to a primary physician for blood pressure management, diabetic screening, and for all other  preventative health concerns.    DISPOSITION:  Patient will be discharged home in stable condition.    FOLLOW UP:  No follow-up provider specified.    OUTPATIENT MEDICATIONS:  New Prescriptions    MECLIZINE (ANTIVERT) 25 MG TAB    Take 1 tablet by mouth 4 times a day.    METOCLOPRAMIDE (REGLAN) 10 MG TAB    Take 1 tablet by mouth 4 times a day. For abdominal pressure/pain       FINAL IMPRESSION  1. Dehydration    2. Vertigo    3. Non-intractable vomiting with nausea, unspecified vomiting type    4. Hypokalemia    5. Epigastric abdominal pain    6. Hiatal hernia         Ezra MOLINA (Scribvannesa), am scribing for, and in the presence of, Chaya Murphy D.O..    Electronically signed by: Ezra Mcgarry (Ruby), 5/30/2021    IChaya D.O. personally performed the services described in this documentation, as scribed by Ezra Mcgarry in my presence, and it is both accurate and complete.    The note accurately reflects work and decisions made by me.  Chaya Murphy D.O.  5/30/2021  10:43 PM

## 2021-06-02 LAB
BACTERIA UR CULT: NORMAL
SIGNIFICANT IND 70042: NORMAL
SITE SITE: NORMAL
SOURCE SOURCE: NORMAL

## 2021-06-07 ENCOUNTER — TELEPHONE (OUTPATIENT)
Dept: PHYSICAL MEDICINE AND REHAB | Facility: MEDICAL CENTER | Age: 51
End: 2021-06-07

## 2021-06-07 NOTE — TELEPHONE ENCOUNTER
Pt called to let Dr. Kerr know about the results of her recent hospitalization. Pt was diagnosed with breast cancer. She also wanted to let Dr. Kerr know she would need a refill on Percocet. She will run out on 6/9/21. ND    Please advise.

## 2021-06-08 DIAGNOSIS — M05.79 RHEUMATOID ARTHRITIS INVOLVING MULTIPLE SITES WITH POSITIVE RHEUMATOID FACTOR (HCC): ICD-10-CM

## 2021-06-08 DIAGNOSIS — F11.90 CHRONIC, CONTINUOUS USE OF OPIOIDS: ICD-10-CM

## 2021-06-08 DIAGNOSIS — M19.019 ARTHRITIS OF GLENOHUMERAL JOINT: ICD-10-CM

## 2021-06-08 RX ORDER — OXYCODONE AND ACETAMINOPHEN 7.5; 325 MG/1; MG/1
1 TABLET ORAL EVERY 4 HOURS PRN
Qty: 150 TABLET | Refills: 0 | Status: SHIPPED | OUTPATIENT
Start: 2021-06-09 | End: 2021-07-08 | Stop reason: SDUPTHER

## 2021-07-08 ENCOUNTER — OFFICE VISIT (OUTPATIENT)
Dept: PHYSICAL MEDICINE AND REHAB | Facility: MEDICAL CENTER | Age: 51
End: 2021-07-08
Payer: MEDICAID

## 2021-07-08 VITALS
WEIGHT: 102.73 LBS | DIASTOLIC BLOOD PRESSURE: 70 MMHG | SYSTOLIC BLOOD PRESSURE: 120 MMHG | TEMPERATURE: 98.4 F | HEART RATE: 97 BPM | BODY MASS INDEX: 18.2 KG/M2 | OXYGEN SATURATION: 96 % | HEIGHT: 63 IN

## 2021-07-08 DIAGNOSIS — M19.019 ARTHRITIS OF GLENOHUMERAL JOINT: ICD-10-CM

## 2021-07-08 DIAGNOSIS — M53.2X1 ATLANTOAXIAL INSTABILITY: ICD-10-CM

## 2021-07-08 DIAGNOSIS — R63.4 UNINTENTIONAL WEIGHT LOSS: ICD-10-CM

## 2021-07-08 DIAGNOSIS — M05.79 RHEUMATOID ARTHRITIS INVOLVING MULTIPLE SITES WITH POSITIVE RHEUMATOID FACTOR (HCC): ICD-10-CM

## 2021-07-08 DIAGNOSIS — F11.90 CHRONIC, CONTINUOUS USE OF OPIOIDS: ICD-10-CM

## 2021-07-08 PROCEDURE — 99214 OFFICE O/P EST MOD 30 MIN: CPT | Performed by: PHYSICAL MEDICINE & REHABILITATION

## 2021-07-08 RX ORDER — OXYCODONE AND ACETAMINOPHEN 7.5; 325 MG/1; MG/1
1 TABLET ORAL EVERY 4 HOURS PRN
Qty: 150 TABLET | Refills: 0 | Status: SHIPPED | OUTPATIENT
Start: 2021-08-08 | End: 2021-09-07 | Stop reason: SDUPTHER

## 2021-07-08 RX ORDER — OXYCODONE AND ACETAMINOPHEN 7.5; 325 MG/1; MG/1
1 TABLET ORAL EVERY 4 HOURS PRN
Qty: 150 TABLET | Refills: 0 | Status: SHIPPED | OUTPATIENT
Start: 2021-07-09 | End: 2021-08-08

## 2021-07-08 ASSESSMENT — PATIENT HEALTH QUESTIONNAIRE - PHQ9
SUM OF ALL RESPONSES TO PHQ QUESTIONS 1-9: 22
5. POOR APPETITE OR OVEREATING: 3 - NEARLY EVERY DAY
CLINICAL INTERPRETATION OF PHQ2 SCORE: 6

## 2021-07-08 ASSESSMENT — PAIN SCALES - GENERAL: PAINLEVEL: 10=SEVERE PAIN

## 2021-07-08 ASSESSMENT — FIBROSIS 4 INDEX: FIB4 SCORE: 0.32

## 2021-07-08 NOTE — Clinical Note
Thank you for your care of Mary and for ordering her mammogram.  Unclear what is going on here!    Jayy Kerr MD

## 2021-07-08 NOTE — PROGRESS NOTES
"Follow up patient note  Pain Medicine, Interventional spine and sports physiatry, Physical medicine rehabilitation    Date of Service: 07/08/2021    Chief complaint:   Joint pain      HISTORY    Please see new patient note dated 06/08/2018 by Dr Kerr,  for more details.     HPI  Patient identification: Mary Martinez 50 y.o. female returns for follow-up of her pain related to rheumatoid arthritis.      Interval history:   Since the last visit, Mary continues to have weight loss.  She has seen her PCP, Dr. Beasley, who told her that she \"did not have cancer,\" although I don't have any records about this.    She has not had a mammogram yet, although it looks like this was ordered by her Neurosurgeon's office, Dr. Valdovinos.  Notes reviewed from ROSSI More on 06/29/2021.    Belly pain continues, but her weight is stabilizing somewhat.    She reports that her pain is 10/10 on the NRS.  Neck pain is worse.  She is not able to read and this is making it more difficult for her.  Her kids are making a stand for her to be able to read without looking down.  Dr. Valdovinos's office has ordered xrays of the cervical spine to reassess subluxation of C1-2    For pain, she is taking percocet 7.5/325 five a day, #150 per month and stable. No side effects from this.  No bowel or bladder changes.  Bowel movements are regular.    Dr. Dela Cruz, Rheumatologist    PHQ-9: 22, denies suicidality   ORT: 4    ROS  See HPI    Red Flags :   Fever, Chills, Sweats: Denies  Involuntary Weight Loss: Denies  Bowel/Bladder Incontinence: Denies      PMHx:   Past Medical History:   Diagnosis Date   • Arthritis     Rheumatoid -follows with rheumatologist. Has several fractures due to RA.   • ASTHMA     inhalers prn   • Bowel habit changes     4/24/20-constipation and diarrhea.   • Bronchitis last approx 2018   • Chronic pain 04/24/2020    Due to RA   • Dental disorder     no teeth upper-does not wear her denture. Broken teeth on bottom.   • Heart " burn     taking famotidine   • Hiatus hernia syndrome    • History of pancreatitis    • Indigestion     taking famotidine   • Pain     everywhere   • Pneumonia 10/2019   • Psychiatric problem     anxiety and depression   • Rheumatoid arthritis(714.0) 2003       PSHx:   Past Surgical History:   Procedure Laterality Date   • PB ENDOSCOPIC US EXAM, ESOPH  4/29/2020    Procedure: EGD, WITH ENDOSCOPIC US;  Surgeon: Jorge Brooke M.D.;  Location: [facility];  Service: Gastroenterology   • GASTROSCOPY-ENDO  4/29/2020    Procedure: GASTROSCOPY;  Surgeon: Jorge Brooke M.D.;  Location: [facility];  Service: Gastroenterology   • EGD WITH ASP/BX  4/29/2020    Procedure: EGD, WITH ASPIRATION BIOPSY - POSS FNA;  Surgeon: Jorge Brooke M.D.;  Location: [facility];  Service: Gastroenterology   • PB EXPLORATORY OF ABDOMEN N/A 10/4/2019    Procedure: LAPAROTOMY, EXPLORATORY AND REPAIR OF DUODENAL PERFORATION;  Surgeon: James Dumont M.D.;  Location: [facility];  Service: General   • COLONOSCOPY  2018    with upper endoscopy   • FINGER ARTHROPLASTY Right 6/5/2017    Procedure: FINGER ARTHROPLASTY - LONG, RING AND SMALL VOLAR PLATE;  Surgeon: Lobo Rosen M.D.;  Location: SURGERY SAME DAY [facility];  Service:    • FINGER AMPUTATION  6/5/2017    Procedure: FINGER AMPUTATION - LONG, RING AND SMALL AT THE PROXIMAL INTERPHALANGEAL JOINT;  Surgeon: Lobo Rosen M.D.;  Location: SURGERY SAME DAY [facility];  Service:    • FINGER ARTHROPLASTY Right 4/17/2017    Procedure: FINGER ARTHROPLASTY ;  Surgeon: Lobo Rosen M.D.;  Location: SURGERY SAME DAY [facility];  Service:    • FINGER AMPUTATION Right 9/14/2016    Procedure: FINGER AMPUTATION INDEX;  Surgeon: Lobo Rosen M.D.;  Location: SURGERY SAME DAY [facility];  Service:    • IRRIGATION & DEBRIDEMENT ORTHO Right 9/11/2016    Procedure: IRRIGATION & DEBRIDEMENT  ORTHO - right index finger;  Surgeon: Madhu Rosen M.D.;  Location: SURGERY [facility];  Service:    • PIP ARTHRODESIS Right 8/29/2016    Procedure: RE-DO INDEX FINGER PROXIMAL INTERPHALANGEAL ARTHRODESIS;  Surgeon: Lobo Rosen M.D.;  Location: SURGERY SAME DAY [facility];  Service:    • BONE GRAFT Right 8/29/2016    Procedure: BONE GRAFT - DISTAL RADIUS ;  Surgeon: Lobo Rosen M.D.;  Location: SURGERY SAME DAY [facility];  Service:    • ARTHRODESIS Right 5/6/2016    Procedure: ARTHRODESIS INDEX FINGER PROXIMAL INTERPHALANGEAL;  Surgeon: Lobo Rosen M.D.;  Location: SURGERY SAME DAY [facility];  Service:    • FOOT RECONSTRUCTION RHEUMATIC Left 7/29/2015    Procedure: FOOT RECONSTRUCTION RHEUMATIC;  Surgeon: Heriberto Alves M.D.;  Location: SURGERY [facility];  Service:    • TOE FUSION Right 5/27/2015    Procedure: TOE FUSION 1ST METATARSALPHALANGEAL JOINT;  Surgeon: Heriberto Alves M.D.;  Location: SURGERY [facility];  Service:    • BONE SPUR EXCISION  5/27/2015    Procedure: BONE SPUR EXCISION METATARSAL HEAD 2-3;  Surgeon: Heriberto Alves M.D.;  Location: SURGERY [facility];  Service:    • HAMMERTOE CORRECTION  5/27/2015    Procedure: HAMMERTOE CORRECTION AND SOFT TISSUE RE-ALINGMENT 2-3 ;  Surgeon: Heriberto Alves M.D.;  Location: SURGERY [facility];  Service:    • DENTAL EXTRACTION(S)  2014    all of upper teeth   • ABDOMINAL ABSCESS DRAINAGE  9/27/2011    Performed by VERO WRIGHT at [facility]   • EMANUEL BY LAPAROSCOPY  9/27/2011    Performed by VERO WRIGHT at [facility]   • APPENDECTOMY  2011    Found out it had ruptured prior to abcess surgery   • REPEAT C SECTION  2008   • REPEAT C SECTION  2005   • REPEAT C SECTION  1999   • PRIMARY C SECTION  1991   • TONSILLECTOMY  1982   • WRIST EXPLORATION Left 1980's    fractured wrist-no hardware       Family history   History reviewed. No pertinent family  history.      Medications:   Outpatient Medications Marked as Taking for the 7/8/21 encounter (Office Visit) with Jayy Kerr M.D.   Medication Sig Dispense Refill   • oxyCODONE-acetaminophen (PERCOCET) 7.5-325 MG per tablet Take 1 tablet by mouth every four hours as needed for up to 30 days. 150 tablet 0   • [START ON 8/8/2021] oxyCODONE-acetaminophen (PERCOCET) 7.5-325 MG per tablet Take 1 tablet by mouth every four hours as needed for up to 30 days. 150 tablet 0   • metoclopramide (REGLAN) 10 MG Tab Take 1 tablet by mouth 4 times a day. For abdominal pressure/pain 120 tablet 0   • meclizine (ANTIVERT) 25 MG Tab Take 1 tablet by mouth 4 times a day. 120 tablet 0   • QUEtiapine (SEROQUEL) 25 MG Tab Take 25 mg by mouth 2 times a day as needed for Anxiety.     • ENBREL 50 MG/ML Solution Prefilled Syringe INJECT 50 MG ONCE WEEKLY UNDERNEATH THE SKIN  FOR 28 DAYS     • Naloxone (NARCAN) 4 MG/0.1ML Liquid One spray in one nostril for overdose and call 911. 1 Package 0   • ondansetron (ZOFRAN ODT) 8 MG TABLET DISPERSIBLE Take 8 mg by mouth every 8 hours as needed.     • QUEtiapine (SEROQUEL) 100 MG Tab Take 100 mg by mouth every evening.     • PARoxetine (PAXIL) 30 MG Tab Take 60 mg by mouth every evening.     • albuterol (VENTOLIN OR PROVENTIL) 108 (90 BASE) MCG/ACT AERS inhalation aerosol Inhale 2 Puffs by mouth every four hours as needed for Shortness of Breath.         Allergies:   Allergies   Allergen Reactions   • Penicillins Anaphylaxis and Hives     Tolerates cephalosporins; reports throat swelling with PCN   • Aripiprazole Nausea     Spasms, shaking     • Nitrous Oxide Vomiting       Social Hx:   Social History     Socioeconomic History   • Marital status:      Spouse name: Not on file   • Number of children: Not on file   • Years of education: Not on file   • Highest education level: Not on file   Occupational History   • Not on file   Tobacco Use   • Smoking status: Current Every Day Smoker      "Packs/day: 1.00     Years: 30.00     Pack years: 30.00     Types: Cigarettes   • Smokeless tobacco: Never Used   • Tobacco comment: 4/24/20-now smoking approx 1/2 PPD.   Vaping Use   • Vaping Use: Never used   Substance and Sexual Activity   • Alcohol use: Never   • Drug use: Never   • Sexual activity: Not on file   Other Topics Concern   •  Service No   • Blood Transfusions Yes   • Caffeine Concern No   • Occupational Exposure No   • Hobby Hazards No   • Sleep Concern No   • Stress Concern Yes   • Weight Concern No   • Special Diet No   • Back Care No   • Exercise Yes   • Bike Helmet Yes   • Seat Belt Yes   • Self-Exams Yes   Social History Narrative    ** Merged History Encounter **          Social Determinants of Health     Financial Resource Strain:    • Difficulty of Paying Living Expenses:    Food Insecurity:    • Worried About Running Out of Food in the Last Year:    • Ran Out of Food in the Last Year:    Transportation Needs:    • Lack of Transportation (Medical):    • Lack of Transportation (Non-Medical):    Physical Activity:    • Days of Exercise per Week:    • Minutes of Exercise per Session:    Stress:    • Feeling of Stress :    Social Connections:    • Frequency of Communication with Friends and Family:    • Frequency of Social Gatherings with Friends and Family:    • Attends Congregation Services:    • Active Member of Clubs or Organizations:    • Attends Club or Organization Meetings:    • Marital Status:    Intimate Partner Violence:    • Fear of Current or Ex-Partner:    • Emotionally Abused:    • Physically Abused:    • Sexually Abused:          EXAMINATION     Physical Exam:   Vitals: /70 (BP Location: Right arm, Patient Position: Sitting, BP Cuff Size: Small adult)   Pulse 97   Temp 36.9 °C (98.4 °F) (Temporal)   Ht 1.6 m (5' 3\")   Wt 46.6 kg (102 lb 11.8 oz)   SpO2 96%     Constitutional:   Body Habitus: Body mass index is 18.2 kg/m².  Cooperation: Fully cooperates with " exam  Appearance: Appears pale, She is wearing a mask  Respiratory-  breathing comfortable on room air, no audible wheezing  Cardiovascular- no lower extremity edema is observed.  Heart rate is regular, but tachycardic  Psychiatric- alert and oriented ×3. Normal affect.     Musculoskeletal:  Right shoulder with crepitus and pain with ROM less than 90 degrees before pain worsens.      Partial hand amputation on the right at the MCPs; ulnar deviation on the left with MCP subluxation, mild atrophy of the hand intrinsics with wasting of the thenar eminence.     Gait is slow, steady without assist device.        MEDICAL DECISION MAKING    DATA    Labs: UDS 10/30/2018 consistent with medications.  Oxycodone and metabolites without other substances   UDS 04/19/2019 consistent with medications. Oxycodone and metabolites without other substances   UDS 07/19/2019 consistent with medications.  Oxycodone and metabolites without other substances   UDS 11/22/2019 consistent with medications.  Oxycodone and metabolites without other substances   UDS 02/20/2020 absent for Oxycodone and metabolites without other substances  UDS 06/10/2020 positive for oxycodone and metabolites without other substances  UDS 08/18/2020 positive for oxycodone and metabolites, positive for EtG without EtS    Imaging:    Films reviewed.  These are my reads:    Xray right shoulder 08/20/2020  There is note of severe degenerative change of the glenohumeral joint.  No fracture.  Erosive changes, consistent with known history of RA    MRI cervical spine 07/31/2018:    There is is note of motion at the atlantodental level with 5mm atlantodental inverval in flexion that reduces to 1-2 mm in extension.  Mild retrolisthesis of C4 on C5 and anterolisthesis of C5- on C6.  Disc extrusion at C6-7 with mild central canal stenosis    Xray left shoulder 2/5/2018 Humeral head is elevated.  Glenohumeral joint arthritis.    Reports reviewed:    Xray right shoulder  08/20/2020 [facility]  Impression  Extensive erosive changes of the humeral head and degenerative changes of the glenohumeral joint.  Findings are concerning for inflammatory arthropathy such as rheumatoid arthritis.      Xray cervical spine 11/14/2018  1. No acute fracture  2. Persistent motion at the C1-2 joint as before, suggesting atlantoaxial instability  3. Minimal instability noted at C5-6 level as described.    MRI cervical spine 07/31/2018  1. Widening of the atlantodental interal in neutral and flexion positions with a 5mm space which reduces to 1-2 mm in extension  2. 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 Rheumatoid arthritis involving multiple sites with positive rheumatoid factor (HCC)  M05.79   2. Arthritis of glenohumeral joint  M19.019   3. 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 Unintentional weight loss  R63.4   5. Chronic, continuous use of opioids  F11.90         ASSESSMENT and PLAN:     Mary Estes Juan 51 y.o. female with rheumatoid arthritis    Mary was seen today for follow-up.    Orders and management for this visit:    Orders Placed This Encounter   • URINE DRUG SCREEN W/CONF (SEND OUT)   • oxyCODONE-acetaminophen (PERCOCET) 7.5-325 MG per tablet   • oxyCODONE-acetaminophen (PERCOCET) 7.5-325 MG per tablet   • Consent for Opiate Prescription   • Controlled Substance Treatment Agreement       1. Encouraged her to follow-up with Mammogram as ordered by Neurosurgery, unclear why the delay, but glad the Dr. Valdovinos's office ordered this.    2. Discussed checking UDS.   reviewed.    3. Continue percocet 7.5/325#150 per month.  Plan to continue current dose.   4. Continue psychology and psychiatry.  She denies suicidality, but is having difficulty with  current ongoing medical condition   5. Stable C1-2 fracture per Dr. Valdovinos's evaluation.  6. Continue activity as tolerated.      In prescribing controlled substances to this patient, I certify that I have obtained and reviewed the medical history of Mary Martinez. I have also made a good aristides effort to obtain applicable records from other providers who have treated the patient and records did not demonstrate any increased risk of substance abuse that would prevent me from prescribing controlled substances.     I have conducted a physical exam and documented it. I have reviewed Ms. Martinez’s prescription history as maintained by the [place] Prescription Monitoring Program.   ORT 4, indicates moderate risk    I have assessed the patient’s risk for abuse, dependency, and addiction using the validated Opioid Risk Tool available at https://www.mdcalc.com/nnujgy-rtfw-utnl-ort-narcotic-abuse.     Given the above, I believe the benefits of controlled substance therapy outweigh the risks. The reasons for prescribing controlled substances include non-narcotic, oral analgesic alternatives have been inadequate for pain control and in my professional opinion, controlled substances are the only reasonable choice for this patient because her pain was note adequately controlled with alternatives and she has chronic progressive disease. Accordingly, I have discussed the risk and benefits, treatment plan, and alternative therapies with the patient.       Follow up: 2 months      Please note that this dictation was created using voice recognition software. I have made every reasonable attempt to correct obvious errors but there may be errors of grammar and content that I may have overlooked prior to finalization of this note.      Jayy Kerr MD  Interventional Spine and Sports Physiatry  Physical Medicine and Rehabilitation  [facility]      CC: Dr. Fidencio Christopher/[facility]; Dr. Valdovinos

## 2021-07-09 DIAGNOSIS — R59.0 AXILLARY LYMPHADENOPATHY: ICD-10-CM

## 2021-07-09 DIAGNOSIS — M05.79 RHEUMATOID ARTHRITIS INVOLVING MULTIPLE SITES WITH POSITIVE RHEUMATOID FACTOR (HCC): ICD-10-CM

## 2021-07-09 DIAGNOSIS — R63.4 UNINTENTIONAL WEIGHT LOSS: ICD-10-CM

## 2021-07-13 ENCOUNTER — HOSPITAL ENCOUNTER (OUTPATIENT)
Dept: RADIOLOGY | Facility: MEDICAL CENTER | Age: 51
End: 2021-07-13
Payer: MEDICAID

## 2021-07-30 ENCOUNTER — HOSPITAL ENCOUNTER (OUTPATIENT)
Dept: RADIOLOGY | Facility: MEDICAL CENTER | Age: 51
End: 2021-07-30
Payer: MEDICAID

## 2021-07-30 ENCOUNTER — HOSPITAL ENCOUNTER (OUTPATIENT)
Dept: RADIOLOGY | Facility: MEDICAL CENTER | Age: 51
End: 2021-07-30
Attending: NURSE PRACTITIONER
Payer: MEDICAID

## 2021-09-07 ENCOUNTER — OFFICE VISIT (OUTPATIENT)
Dept: PHYSICAL MEDICINE AND REHAB | Facility: MEDICAL CENTER | Age: 51
End: 2021-09-07
Payer: MEDICAID

## 2021-09-07 VITALS
WEIGHT: 103.17 LBS | HEIGHT: 63 IN | SYSTOLIC BLOOD PRESSURE: 108 MMHG | HEART RATE: 100 BPM | DIASTOLIC BLOOD PRESSURE: 64 MMHG | TEMPERATURE: 97 F | OXYGEN SATURATION: 97 % | BODY MASS INDEX: 18.28 KG/M2

## 2021-09-07 DIAGNOSIS — M05.79 RHEUMATOID ARTHRITIS INVOLVING MULTIPLE SITES WITH POSITIVE RHEUMATOID FACTOR (HCC): ICD-10-CM

## 2021-09-07 DIAGNOSIS — F11.90 CHRONIC, CONTINUOUS USE OF OPIOIDS: ICD-10-CM

## 2021-09-07 DIAGNOSIS — M53.2X1 ATLANTOAXIAL INSTABILITY: ICD-10-CM

## 2021-09-07 DIAGNOSIS — M19.019 ARTHRITIS OF GLENOHUMERAL JOINT: ICD-10-CM

## 2021-09-07 PROCEDURE — 99214 OFFICE O/P EST MOD 30 MIN: CPT | Performed by: PHYSICAL MEDICINE & REHABILITATION

## 2021-09-07 RX ORDER — OXYCODONE AND ACETAMINOPHEN 7.5; 325 MG/1; MG/1
1 TABLET ORAL EVERY 4 HOURS PRN
Qty: 150 TABLET | Refills: 0 | Status: SHIPPED | OUTPATIENT
Start: 2021-09-07 | End: 2021-10-07

## 2021-09-07 RX ORDER — OXYCODONE AND ACETAMINOPHEN 7.5; 325 MG/1; MG/1
1 TABLET ORAL EVERY 4 HOURS PRN
Qty: 150 TABLET | Refills: 0 | Status: SHIPPED | OUTPATIENT
Start: 2021-10-07 | End: 2021-11-09 | Stop reason: SDUPTHER

## 2021-09-07 ASSESSMENT — PAIN SCALES - GENERAL: PAINLEVEL: 10=SEVERE PAIN

## 2021-09-07 ASSESSMENT — PATIENT HEALTH QUESTIONNAIRE - PHQ9
SUM OF ALL RESPONSES TO PHQ QUESTIONS 1-9: 18
5. POOR APPETITE OR OVEREATING: 3 - NEARLY EVERY DAY
CLINICAL INTERPRETATION OF PHQ2 SCORE: 4

## 2021-09-07 ASSESSMENT — FIBROSIS 4 INDEX: FIB4 SCORE: 0.32

## 2021-09-07 NOTE — PROGRESS NOTES
"Follow up patient note  Pain Medicine, Interventional spine and sports physiatry, Physical medicine rehabilitation    Date of Service: 09/07/2021    Chief complaint:   Joint pain      HISTORY    Please see new patient note dated 06/08/2018 by Dr Kerr,  for more details.     HPI  Patient identification: Mary Martinez 51 y.o. female returns for follow-up of her pain related to rheumatoid arthritis.      Interval history:   Since the last visit, Mary reports that she has been having more pain all over.  Her pain is worse \"all over\" and she does not see Dr. Dela Cruz until next week.    Neck, shoulders and knees are the worst. Pain in her knees and ankles is worse with standing.  It sounds like she has had to be a lot more active with school starting back and cannot take rest as she usually can.  Kids are having to help get her out of bed and dressed.    Weight loss has stabilized.  No cancer.    She has seen Dr. Christopher and reports that they were going to work on further addressing abdominal symptoms.    She will follow-up with Dr. Valdovinos, but no plans for surgery at this time, but that is his recommendation.    She is taking percocet 7.5/325 five a day, #150 per month and stable. No side effects from this.  No bowel or bladder changes.  Bowel movements are regular.  Apparently, she has been taking ibuprofen now and knows that she should not take this.      PHQ-9: 18, denies suicidality   ORT: 4    ROS  See HPI    Red Flags :   Fever, Chills, Sweats: Denies  Involuntary Weight Loss: Denies  Bowel/Bladder Incontinence: Denies      PMHx:   Past Medical History:   Diagnosis Date   • Arthritis     Rheumatoid -follows with rheumatologist. Has several fractures due to RA.   • ASTHMA     inhalers prn   • Bowel habit changes     4/24/20-constipation and diarrhea.   • Bronchitis last approx 2018   • Chronic pain 04/24/2020    Due to RA   • Dental disorder     no teeth upper-does not wear her denture. Broken teeth on " bottom.   • Heart burn     taking famotidine   • Hiatus hernia syndrome    • History of pancreatitis    • Indigestion     taking famotidine   • Pain     everywhere   • Pneumonia 10/2019   • Psychiatric problem     anxiety and depression   • Rheumatoid arthritis(714.0) 2003       PSHx:   Past Surgical History:   Procedure Laterality Date   • PB ENDOSCOPIC US EXAM, ESOPH  4/29/2020    Procedure: EGD, WITH ENDOSCOPIC US;  Surgeon: Jorge Brooke M.D.;  Location: [facility];  Service: Gastroenterology   • GASTROSCOPY-ENDO  4/29/2020    Procedure: GASTROSCOPY;  Surgeon: Jorge Brooke M.D.;  Location: [facility];  Service: Gastroenterology   • EGD WITH ASP/BX  4/29/2020    Procedure: EGD, WITH ASPIRATION BIOPSY - POSS FNA;  Surgeon: Jorge Brooke M.D.;  Location: [facility];  Service: Gastroenterology   • PB EXPLORATORY OF ABDOMEN N/A 10/4/2019    Procedure: LAPAROTOMY, EXPLORATORY AND REPAIR OF DUODENAL PERFORATION;  Surgeon: James Dumont M.D.;  Location: [facility];  Service: General   • COLONOSCOPY  2018    with upper endoscopy   • FINGER ARTHROPLASTY Right 6/5/2017    Procedure: FINGER ARTHROPLASTY - LONG, RING AND SMALL VOLAR PLATE;  Surgeon: Lobo Rosen M.D.;  Location: SURGERY SAME DAY [facility];  Service:    • FINGER AMPUTATION  6/5/2017    Procedure: FINGER AMPUTATION - LONG, RING AND SMALL AT THE PROXIMAL INTERPHALANGEAL JOINT;  Surgeon: Lobo Rosen M.D.;  Location: SURGERY SAME DAY [facility];  Service:    • FINGER ARTHROPLASTY Right 4/17/2017    Procedure: FINGER ARTHROPLASTY ;  Surgeon: Lobo Rosen M.D.;  Location: SURGERY SAME DAY [facility];  Service:    • FINGER AMPUTATION Right 9/14/2016    Procedure: FINGER AMPUTATION INDEX;  Surgeon: Lobo Rosen M.D.;  Location: SURGERY SAME DAY [facility];  Service:    • IRRIGATION & DEBRIDEMENT ORTHO Right 9/11/2016    Procedure:  IRRIGATION & DEBRIDEMENT ORTHO - right index finger;  Surgeon: Madhu Rosen M.D.;  Location: SURGERY [facility];  Service:    • PIP ARTHRODESIS Right 8/29/2016    Procedure: RE-DO INDEX FINGER PROXIMAL INTERPHALANGEAL ARTHRODESIS;  Surgeon: Lobo Rosen M.D.;  Location: SURGERY SAME DAY [facility];  Service:    • BONE GRAFT Right 8/29/2016    Procedure: BONE GRAFT - DISTAL RADIUS ;  Surgeon: Lobo Rosen M.D.;  Location: SURGERY SAME DAY [facility];  Service:    • ARTHRODESIS Right 5/6/2016    Procedure: ARTHRODESIS INDEX FINGER PROXIMAL INTERPHALANGEAL;  Surgeon: Lobo Rosen M.D.;  Location: SURGERY SAME DAY [facility];  Service:    • FOOT RECONSTRUCTION RHEUMATIC Left 7/29/2015    Procedure: FOOT RECONSTRUCTION RHEUMATIC;  Surgeon: Heriberto Alves M.D.;  Location: SURGERY [facility];  Service:    • TOE FUSION Right 5/27/2015    Procedure: TOE FUSION 1ST METATARSALPHALANGEAL JOINT;  Surgeon: Heriberto Alves M.D.;  Location: SURGERY [facility];  Service:    • BONE SPUR EXCISION  5/27/2015    Procedure: BONE SPUR EXCISION METATARSAL HEAD 2-3;  Surgeon: Heriberto Alves M.D.;  Location: SURGERY [facility];  Service:    • HAMMERTOE CORRECTION  5/27/2015    Procedure: HAMMERTOE CORRECTION AND SOFT TISSUE RE-ALINGMENT 2-3 ;  Surgeon: Heriberto Alves M.D.;  Location: SURGERY [facility];  Service:    • DENTAL EXTRACTION(S)  2014    all of upper teeth   • ABDOMINAL ABSCESS DRAINAGE  9/27/2011    Performed by VERO WRIGHT at [facility]   • EMANUEL BY LAPAROSCOPY  9/27/2011    Performed by VERO WRIGHT at [facility]   • APPENDECTOMY  2011    Found out it had ruptured prior to [facility] surgery   • REPEAT C SECTION  2008   • REPEAT C SECTION  2005   • REPEAT C SECTION  1999   • PRIMARY C SECTION  1991   • TONSILLECTOMY  1982   • WRIST EXPLORATION Left 1980's    fractured wrist-no hardware       Family history   History reviewed. No  pertinent family history.      Medications:   Outpatient Medications Marked as Taking for the 9/7/21 encounter (Office Visit) with Jayy Kerr M.D.   Medication Sig Dispense Refill   • oxyCODONE-acetaminophen (PERCOCET) 7.5-325 MG per tablet Take 1 Tablet by mouth every four hours as needed for up to 30 days. 150 Tablet 0   • [START ON 10/7/2021] oxyCODONE-acetaminophen (PERCOCET) 7.5-325 MG per tablet Take 1 Tablet by mouth every four hours as needed for up to 30 days. 150 Tablet 0   • diclofenac sodium (VOLTAREN) 1 % Gel Apply 4 g topically 4 times a day as needed (pain) for up to 30 days. 100 g 3   • metoclopramide (REGLAN) 10 MG Tab Take 1 tablet by mouth 4 times a day. For abdominal pressure/pain 120 tablet 0   • meclizine (ANTIVERT) 25 MG Tab Take 1 tablet by mouth 4 times a day. 120 tablet 0   • QUEtiapine (SEROQUEL) 25 MG Tab Take 25 mg by mouth 2 times a day as needed for Anxiety.     • ENBREL 50 MG/ML Solution Prefilled Syringe INJECT 50 MG ONCE WEEKLY UNDERNEATH THE SKIN  FOR 28 DAYS     • Naloxone (NARCAN) 4 MG/0.1ML Liquid One spray in one nostril for overdose and call 911. 1 Package 0   • ondansetron (ZOFRAN ODT) 8 MG TABLET DISPERSIBLE Take 8 mg by mouth every 8 hours as needed.     • QUEtiapine (SEROQUEL) 100 MG Tab Take 100 mg by mouth every evening.     • PARoxetine (PAXIL) 30 MG Tab Take 60 mg by mouth every evening.     • albuterol (VENTOLIN OR PROVENTIL) 108 (90 BASE) MCG/ACT AERS inhalation aerosol Inhale 2 Puffs by mouth every four hours as needed for Shortness of Breath.         Allergies:   Allergies   Allergen Reactions   • Penicillins Anaphylaxis and Hives     Tolerates cephalosporins; reports throat swelling with PCN   • Aripiprazole Nausea     Spasms, shaking     • Nitrous Oxide Vomiting       Social Hx:   Social History     Socioeconomic History   • Marital status:      Spouse name: Not on file   • Number of children: Not on file   • Years of education: Not on file   •  "Highest education level: Not on file   Occupational History   • Not on file   Tobacco Use   • Smoking status: Current Every Day Smoker     Packs/day: 1.00     Years: 30.00     Pack years: 30.00     Types: Cigarettes   • Smokeless tobacco: Never Used   • Tobacco comment: 4/24/20-now smoking approx 1/2 PPD.   Vaping Use   • Vaping Use: Never used   Substance and Sexual Activity   • Alcohol use: Never   • Drug use: Never   • Sexual activity: Not on file   Other Topics Concern   •  Service No   • Blood Transfusions Yes   • Caffeine Concern No   • Occupational Exposure No   • Hobby Hazards No   • Sleep Concern No   • Stress Concern Yes   • Weight Concern No   • Special Diet No   • Back Care No   • Exercise Yes   • Bike Helmet Yes   • Seat Belt Yes   • Self-Exams Yes   Social History Narrative    ** Merged History Encounter **          Social Determinants of Health     Financial Resource Strain:    • Difficulty of Paying Living Expenses:    Food Insecurity:    • Worried About Running Out of Food in the Last Year:    • Ran Out of Food in the Last Year:    Transportation Needs:    • Lack of Transportation (Medical):    • Lack of Transportation (Non-Medical):    Physical Activity:    • Days of Exercise per Week:    • Minutes of Exercise per Session:    Stress:    • Feeling of Stress :    Social Connections:    • Frequency of Communication with Friends and Family:    • Frequency of Social Gatherings with Friends and Family:    • Attends [employer]ars[employer] Services:    • Active Member of Clubs or Organizations:    • Attends Club or Organization Meetings:    • Marital Status:    Intimate Partner Violence:    • Fear of Current or Ex-Partner:    • Emotionally Abused:    • Physically Abused:    • Sexually Abused:          EXAMINATION     Physical Exam:   Vitals: /64 (BP Location: Right arm, Patient Position: Sitting, BP Cuff Size: Small adult)   Pulse 100   Temp 36.1 °C (97 °F) (Temporal)   Ht 1.6 m (5' 3\")   Wt 46.8 kg " (103 lb 2.8 oz)   SpO2 97%     Constitutional:   Body Habitus: Body mass index is 18.28 kg/m².  Cooperation: Fully cooperates with exam  Appearance: Appears pale, She is wearing a mask  Respiratory-  breathing comfortable on room air, no audible wheezing  Cardiovascular- no lower extremity edema is observed.  Heart rate is regular  Psychiatric- alert and oriented ×3. Normal affect.     Musculoskeletal:  Right shoulder with crepitus and pain with ROM less than 90 degrees before pain worsens.      Partial hand amputation on the right at the MCPs; ulnar deviation on the left with MCP subluxation, mild atrophy of the hand intrinsics with wasting of the thenar eminence.     Gait is slow, steady without assist device.    No knee effusions.  Mild joint line tenderness medial joint line on the left and lateral joint line on the right.        MEDICAL DECISION MAKING    DATA    Labs: UDS 10/30/2018 consistent with medications.  Oxycodone and metabolites without other substances   UDS 04/19/2019 consistent with medications. Oxycodone and metabolites without other substances   UDS 07/19/2019 consistent with medications.  Oxycodone and metabolites without other substances   UDS 11/22/2019 consistent with medications.  Oxycodone and metabolites without other substances   UDS 02/20/2020 absent for Oxycodone and metabolites without other substances  UDS 06/10/2020 positive for oxycodone and metabolites without other substances  UDS 08/18/2020 positive for oxycodone and metabolites, positive for EtG without EtS    Imaging:    Films reviewed.  These are my reads:    Xray right shoulder 08/20/2020  There is note of severe degenerative change of the glenohumeral joint.  No fracture.  Erosive changes, consistent with known history of RA    MRI cervical spine 07/31/2018:    There is is note of motion at the atlantodental level with 5mm atlantodental inverval in flexion that reduces to 1-2 mm in extension.  Mild retrolisthesis of C4 on  C5 and anterolisthesis of C5- on C6.  Disc extrusion at C6-7 with mild central canal stenosis    Xray left shoulder 2/5/2018 Humeral head is elevated.  Glenohumeral joint arthritis.    Reports reviewed:    Xray right shoulder 08/20/2020 [facility]  Impression  Extensive erosive changes of the humeral head and degenerative changes of the glenohumeral joint.  Findings are concerning for inflammatory arthropathy such as rheumatoid arthritis.      Xray cervical spine 11/14/2018  1. No acute fracture  2. Persistent motion at the C1-2 joint as before, suggesting atlantoaxial instability  3. Minimal instability noted at C5-6 level as described.    MRI cervical spine 07/31/2018  1. Widening of the atlantodental interal in neutral and flexion positions with a 5mm space which reduces to 1-2 mm in extension  2. 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 Rheumatoid arthritis involving multiple sites with positive rheumatoid factor (HCC)  M05.79   2. Arthritis of glenohumeral joint  M19.019   3. 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 Chronic, continuous use of opioids  F11.90         ASSESSMENT and PLAN:     Mary Estes Juan 51 y.o. female with rheumatoid arthritis    Mary was seen today for follow-up.    Orders and management for this visit:    Orders Placed This Encounter   • Pain Management Screen   • Patient has been identified as having a positive depression screening. Appropriate orders and counseling have been given.   • oxyCODONE-acetaminophen (PERCOCET) 7.5-325 MG per tablet   • oxyCODONE-acetaminophen (PERCOCET) 7.5-325 MG per tablet   • diclofenac sodium (VOLTAREN) 1 % Gel   • Consent for Opiate Prescription   • Controlled Substance Treatment Agreement         1. Discussed plan to see  Dr. Dela Cruz.  She is going to call for an appointment, sooner, if possible.  2. Advised that she not take ibuprofen.  3. Ordered UDS.  reviewed.  4. Discussed that she will follow-up with Dr. Valdovinos regarding findings of Atlantoaxial instability and odontoid fracture.  As I understand it, she is not willing to proceed with surgery at this time.  5. Continue psychology and psychiatry.  She denies suicidality, but is having difficulty with current ongoing medical condition   6. Working on smoking cessation.  7. Trial diclofenac gel for the knees.  Refilled today.        In prescribing controlled substances to this patient, I certify that I have obtained and reviewed the medical history of Mary Martinez. I have also made a good aristides effort to obtain applicable records from other providers who have treated the patient and records did not demonstrate any increased risk of substance abuse that would prevent me from prescribing controlled substances.     I have conducted a physical exam and documented it. I have reviewed Ms. Martinez’s prescription history as maintained by the [place] Prescription Monitoring Program.   ORT 4, indicates moderate risk    I have assessed the patient’s risk for abuse, dependency, and addiction using the validated Opioid Risk Tool available at https://www.mdcalc.com/wamefr-yzsq-xrat-ort-narcotic-abuse.     Given the above, I believe the benefits of controlled substance therapy outweigh the risks. The reasons for prescribing controlled substances include non-narcotic, oral analgesic alternatives have been inadequate for pain control and in my professional opinion, controlled substances are the only reasonable choice for this patient because her pain was note adequately controlled with alternatives and she has chronic progressive disease. Accordingly, I have discussed the risk and benefits, treatment plan, and alternative therapies with the patient.       Follow up: 2 months      Please  note that this dictation was created using voice recognition software. I have made every reasonable attempt to correct obvious errors but there may be errors of grammar and content that I may have overlooked prior to finalization of this note.      Jayy Kerr MD  Interventional Spine and Sports Physiatry  Physical Medicine and Rehabilitation  [facility]      CC: Dr. Fidencio Christopher/[facility]

## 2021-09-14 NOTE — ASSESSMENT & PLAN NOTE
Secondary to room arthritis and multiple surgeries for complications of this condition  Analgesia as clinically appropriate, caution at this point with soft blood pressure   SHORTNESS OF BREATH

## 2021-11-09 ENCOUNTER — OFFICE VISIT (OUTPATIENT)
Dept: PHYSICAL MEDICINE AND REHAB | Facility: MEDICAL CENTER | Age: 51
End: 2021-11-09
Payer: MEDICAID

## 2021-11-09 VITALS
OXYGEN SATURATION: 99 % | DIASTOLIC BLOOD PRESSURE: 62 MMHG | WEIGHT: 109.79 LBS | BODY MASS INDEX: 19.45 KG/M2 | SYSTOLIC BLOOD PRESSURE: 110 MMHG | HEIGHT: 63 IN | HEART RATE: 109 BPM | TEMPERATURE: 97.7 F

## 2021-11-09 DIAGNOSIS — F11.90 CHRONIC, CONTINUOUS USE OF OPIOIDS: ICD-10-CM

## 2021-11-09 DIAGNOSIS — M19.019 ARTHRITIS OF GLENOHUMERAL JOINT: ICD-10-CM

## 2021-11-09 DIAGNOSIS — F32.A DEPRESSION, UNSPECIFIED DEPRESSION TYPE: ICD-10-CM

## 2021-11-09 DIAGNOSIS — M05.79 RHEUMATOID ARTHRITIS INVOLVING MULTIPLE SITES WITH POSITIVE RHEUMATOID FACTOR (HCC): ICD-10-CM

## 2021-11-09 DIAGNOSIS — M53.2X1 ATLANTOAXIAL INSTABILITY: ICD-10-CM

## 2021-11-09 PROCEDURE — 99214 OFFICE O/P EST MOD 30 MIN: CPT | Performed by: PHYSICAL MEDICINE & REHABILITATION

## 2021-11-09 RX ORDER — OXYCODONE AND ACETAMINOPHEN 7.5; 325 MG/1; MG/1
1 TABLET ORAL EVERY 4 HOURS PRN
Qty: 150 TABLET | Refills: 0 | Status: SHIPPED | OUTPATIENT
Start: 2021-12-09 | End: 2022-01-04 | Stop reason: SDUPTHER

## 2021-11-09 RX ORDER — OXYCODONE AND ACETAMINOPHEN 7.5; 325 MG/1; MG/1
1 TABLET ORAL EVERY 4 HOURS PRN
Qty: 150 TABLET | Refills: 0 | Status: SHIPPED | OUTPATIENT
Start: 2021-11-09 | End: 2021-12-09

## 2021-11-09 ASSESSMENT — PATIENT HEALTH QUESTIONNAIRE - PHQ9
SUM OF ALL RESPONSES TO PHQ QUESTIONS 1-9: 20
5. POOR APPETITE OR OVEREATING: 2 - MORE THAN HALF THE DAYS
CLINICAL INTERPRETATION OF PHQ2 SCORE: 4

## 2021-11-09 ASSESSMENT — FIBROSIS 4 INDEX: FIB4 SCORE: 0.32

## 2021-11-09 ASSESSMENT — PAIN SCALES - GENERAL: PAINLEVEL: 10=SEVERE PAIN

## 2021-11-09 NOTE — PROGRESS NOTES
Follow up patient note  Pain Medicine, Interventional spine and sports physiatry, Physical medicine rehabilitation    Date of Service:11/09/2021    Chief complaint:   Joint pain      HISTORY    Please see new patient note dated 06/08/2018 by Dr Kerr,  for more details.     HPI  Patient identification: Mary Martinez 51 y.o. female returns for follow-up of her pain related to rheumatoid arthritis.      Interval history:   Her pain has been very painful and she is very anxious, there was a mix-up with her medications and she ran out on 11/6/2021.  Pain is significantly worse.  Pain is 10/10 on the NRS.  Neck and hands have been more painful, but usually, the shoulders are most painful.  Knees and ankles have been more painful.  She does get some relief from diclofenac gel for her knees and right shoulder, usually at night.  She is able to dress independently with large clothes and pants are sometimes difficult to get off and she gets help.    Her symptoms continue to be diffuse.  Since the last visit, she has seen Dr. Dela Cruz and had a course of steroids.    She is under work-up for celiac disease with GI.    Since last visit, she has not seen Dr. Valdovinos.    She is taking percocet 7.5/325 five a day, #150 per month and stable. No side effects from this.  No bowel or bladder changes.  Bowel movements are regular.  She took ibuprofen this weekend, due to being out of her medications.  Feeling more anxious since medication ran out.      PHQ-9: 20, denies suicidality   ORT: 4    ROS  See HPI    Red Flags :   Fever, Chills, Sweats: Denies  Involuntary Weight Loss: Denies  Bowel/Bladder Incontinence: Denies      PMHx:   Past Medical History:   Diagnosis Date   • Arthritis     Rheumatoid -follows with rheumatologist. Has several fractures due to RA.   • ASTHMA     inhalers prn   • Bowel habit changes     4/24/20-constipation and diarrhea.   • Bronchitis last approx 2018   • Chronic pain 04/24/2020    Due to RA   •  Dental disorder     no teeth upper-does not wear her denture. Broken teeth on bottom.   • Heart burn     taking famotidine   • Hiatus hernia syndrome    • History of pancreatitis    • Indigestion     taking famotidine   • Pain     everywhere   • Pneumonia 10/2019   • Psychiatric problem     anxiety and depression   • Rheumatoid arthritis(714.0) 2003       PSHx:   Past Surgical History:   Procedure Laterality Date   • PB ENDOSCOPIC US EXAM, ESOPH  4/29/2020    Procedure: EGD, WITH ENDOSCOPIC US;  Surgeon: Jorge Brooke M.D.;  Location: [facility];  Service: Gastroenterology   • GASTROSCOPY-ENDO  4/29/2020    Procedure: GASTROSCOPY;  Surgeon: Jorge Brooke M.D.;  Location: [facility];  Service: Gastroenterology   • EGD WITH ASP/BX  4/29/2020    Procedure: EGD, WITH ASPIRATION BIOPSY - POSS FNA;  Surgeon: Jorge Brooke M.D.;  Location: [facility];  Service: Gastroenterology   • PB EXPLORATORY OF ABDOMEN N/A 10/4/2019    Procedure: LAPAROTOMY, EXPLORATORY AND REPAIR OF DUODENAL PERFORATION;  Surgeon: James Dumont M.D.;  Location: [facility];  Service: General   • COLONOSCOPY  2018    with upper endoscopy   • FINGER ARTHROPLASTY Right 6/5/2017    Procedure: FINGER ARTHROPLASTY - LONG, RING AND SMALL VOLAR PLATE;  Surgeon: Lobo Rosen M.D.;  Location: SURGERY SAME DAY [facility];  Service:    • FINGER AMPUTATION  6/5/2017    Procedure: FINGER AMPUTATION - LONG, RING AND SMALL AT THE PROXIMAL INTERPHALANGEAL JOINT;  Surgeon: Lobo Rosen M.D.;  Location: SURGERY SAME DAY [facility];  Service:    • FINGER ARTHROPLASTY Right 4/17/2017    Procedure: FINGER ARTHROPLASTY ;  Surgeon: Lobo Rosen M.D.;  Location: SURGERY SAME DAY [facility];  Service:    • FINGER AMPUTATION Right 9/14/2016    Procedure: FINGER AMPUTATION INDEX;  Surgeon: Lobo Rosen M.D.;  Location: SURGERY SAME DAY [facility];   Service:    • IRRIGATION & DEBRIDEMENT ORTHO Right 9/11/2016    Procedure: IRRIGATION & DEBRIDEMENT ORTHO - right index finger;  Surgeon: Madhu Rosen M.D.;  Location: SURGERY [facility];  Service:    • PIP ARTHRODESIS Right 8/29/2016    Procedure: RE-DO INDEX FINGER PROXIMAL INTERPHALANGEAL ARTHRODESIS;  Surgeon: Lobo Rosen M.D.;  Location: SURGERY SAME DAY [facility];  Service:    • BONE GRAFT Right 8/29/2016    Procedure: BONE GRAFT - DISTAL RADIUS ;  Surgeon: Lobo Rosen M.D.;  Location: SURGERY SAME DAY [facility];  Service:    • ARTHRODESIS Right 5/6/2016    Procedure: ARTHRODESIS INDEX FINGER PROXIMAL INTERPHALANGEAL;  Surgeon: Lobo Rosen M.D.;  Location: SURGERY SAME DAY [facility];  Service:    • FOOT RECONSTRUCTION RHEUMATIC Left 7/29/2015    Procedure: FOOT RECONSTRUCTION RHEUMATIC;  Surgeon: Heriberto Alves M.D.;  Location: SURGERY [facility];  Service:    • TOE FUSION Right 5/27/2015    Procedure: TOE FUSION 1ST METATARSALPHALANGEAL JOINT;  Surgeon: Heriberto Alves M.D.;  Location: SURGERY [facility];  Service:    • BONE SPUR EXCISION  5/27/2015    Procedure: BONE SPUR EXCISION METATARSAL HEAD 2-3;  Surgeon: Heriberto Alves M.D.;  Location: SURGERY [facility];  Service:    • HAMMERTOE CORRECTION  5/27/2015    Procedure: HAMMERTOE CORRECTION AND SOFT TISSUE RE-ALINGMENT 2-3 ;  Surgeon: Heriberto Alves M.D.;  Location: SURGERY [facility];  Service:    • DENTAL EXTRACTION(S)  2014    all of upper teeth   • ABDOMINAL ABSCESS DRAINAGE  9/27/2011    Performed by VERO WRIGHT at [facility]   • EMANUEL BY LAPAROSCOPY  9/27/2011    Performed by VERO WRIGHT at [facility]   • APPENDECTOMY  2011    Found out it had ruptured prior to abcess surgery   • REPEAT C SECTION  2008   • REPEAT C SECTION  2005   • REPEAT C SECTION  1999   • PRIMARY C SECTION  1991   • TONSILLECTOMY  1982   • WRIST EXPLORATION Left 1980's     fractured wrist-no hardware       Family history   History reviewed. No pertinent family history.      Medications:   Outpatient Medications Marked as Taking for the 11/9/21 encounter (Office Visit) with Jayy Kerr M.D.   Medication Sig Dispense Refill   • oxyCODONE-acetaminophen (PERCOCET) 7.5-325 MG per tablet Take 1 Tablet by mouth every four hours as needed for up to 30 days. 150 Tablet 0   • [START ON 12/9/2021] oxyCODONE-acetaminophen (PERCOCET) 7.5-325 MG per tablet Take 1 Tablet by mouth every four hours as needed for up to 30 days. 150 Tablet 0   • metoclopramide (REGLAN) 10 MG Tab Take 1 tablet by mouth 4 times a day. For abdominal pressure/pain 120 tablet 0   • meclizine (ANTIVERT) 25 MG Tab Take 1 tablet by mouth 4 times a day. 120 tablet 0   • QUEtiapine (SEROQUEL) 25 MG Tab Take 25 mg by mouth 2 times a day as needed for Anxiety.     • ENBREL 50 MG/ML Solution Prefilled Syringe INJECT 50 MG ONCE WEEKLY UNDERNEATH THE SKIN  FOR 28 DAYS     • Naloxone (NARCAN) 4 MG/0.1ML Liquid One spray in one nostril for overdose and call 911. 1 Package 0   • ondansetron (ZOFRAN ODT) 8 MG TABLET DISPERSIBLE Take 8 mg by mouth every 8 hours as needed.     • QUEtiapine (SEROQUEL) 100 MG Tab Take 100 mg by mouth every evening.     • PARoxetine (PAXIL) 30 MG Tab Take 60 mg by mouth every evening.     • albuterol (VENTOLIN OR PROVENTIL) 108 (90 BASE) MCG/ACT AERS inhalation aerosol Inhale 2 Puffs by mouth every four hours as needed for Shortness of Breath.         Allergies:   Allergies   Allergen Reactions   • Penicillins Anaphylaxis and Hives     Tolerates cephalosporins; reports throat swelling with PCN   • Aripiprazole Nausea     Spasms, shaking     • Nitrous Oxide Vomiting       Social Hx:   Social History     Socioeconomic History   • Marital status:      Spouse name: Not on file   • Number of children: Not on file   • Years of education: Not on file   • Highest education level: Not on file    Occupational History   • Not on file   Tobacco Use   • Smoking status: Current Every Day Smoker     Packs/day: 1.00     Years: 30.00     Pack years: 30.00     Types: Cigarettes   • Smokeless tobacco: Never Used   • Tobacco comment: 4/24/20-now smoking approx 1/2 PPD.   Vaping Use   • Vaping Use: Never used   Substance and Sexual Activity   • Alcohol use: Never   • Drug use: Never   • Sexual activity: Not on file   Other Topics Concern   •  Service No   • Blood Transfusions Yes   • Caffeine Concern No   • Occupational Exposure No   • Hobby Hazards No   • Sleep Concern No   • Stress Concern Yes   • Weight Concern No   • Special Diet No   • Back Care No   • Exercise Yes   • Bike Helmet Yes   • Seat Belt Yes   • Self-Exams Yes   Social History Narrative    ** Merged History Encounter **          Social Determinants of Health     Financial Resource Strain:    • Difficulty of Paying Living Expenses: Not on file   Food Insecurity:    • Worried About Running Out of Food in the Last Year: Not on file   • Ran Out of Food in the Last Year: Not on file   Transportation Needs:    • Lack of Transportation (Medical): Not on file   • Lack of Transportation (Non-Medical): Not on file   Physical Activity:    • Days of Exercise per Week: Not on file   • Minutes of Exercise per Session: Not on file   Stress:    • Feeling of Stress : Not on file   Social Connections:    • Frequency of Communication with Friends and Family: Not on file   • Frequency of Social Gatherings with Friends and Family: Not on file   • Attends [employer] Services: Not on file   • Active Member of Clubs or Organizations: Not on file   • Attends Club or Organization Meetings: Not on file   • Marital Status: Not on file   Intimate Partner Violence:    • Fear of Current or Ex-Partner: Not on file   • Emotionally Abused: Not on file   • Physically Abused: Not on file   • Sexually Abused: Not on file   Housing Stability:    • Unable to Pay for Housing in the  "Last Year: Not on file   • Number of Places Lived in the Last Year: Not on file   • Unstable Housing in the Last Year: Not on file         EXAMINATION     Physical Exam:   Vitals: /62 (BP Location: Right arm, Patient Position: Sitting, BP Cuff Size: Small adult)   Pulse (!) 109   Temp 36.5 °C (97.7 °F) (Temporal)   Ht 1.6 m (5' 3\")   Wt 49.8 kg (109 lb 12.6 oz)   SpO2 99%     Constitutional:   Body Habitus: Body mass index is 19.45 kg/m².  Cooperation: Fully cooperates with exam  Appearance: Appears pale, She is wearing a mask  Respiratory-  breathing comfortable on room air, no audible wheezing  Cardiovascular- no lower extremity edema is observed.  Heart rate is regular  Psychiatric- alert and oriented ×3. Normal affect.     Musculoskeletal:  Right shoulder with crepitus and pain with ROM less than 80 degrees before pain worsens with full abduction on the left    Partial hand amputation on the right at the MCPs; ulnar deviation on the left with MCP subluxation, mild atrophy of the hand intrinsics with wasting of the thenar eminence.     Gait is steady without assist device.    No knee effusions.  Mild joint line tenderness medial joint line on the left and lateral joint line on the right.    No focal motor or sensory deficits in the lower extremities bilaterally    Reflexes 3+ patella and 2+ achilles bilaterally.  No clonus bilaterally    MEDICAL DECISION MAKING    DATA    Labs: UDS 10/30/2018 consistent with medications.  Oxycodone and metabolites without other substances   UDS 04/19/2019 consistent with medications. Oxycodone and metabolites without other substances   UDS 07/19/2019 consistent with medications.  Oxycodone and metabolites without other substances   UDS 11/22/2019 consistent with medications.  Oxycodone and metabolites without other substances   UDS 02/20/2020 absent for Oxycodone and metabolites without other substances  UDS 06/10/2020 positive for oxycodone and metabolites without " other substances  UDS 08/18/2020 positive for oxycodone and metabolites, positive for EtG without EtS  UDS 09/07/2021 negative for oxycodone and metabolites, patient was out of medication, no other abnormalities    Imaging:    Films reviewed.  These are my reads:    Xray right shoulder 08/20/2020  There is note of severe degenerative change of the glenohumeral joint.  No fracture.  Erosive changes, consistent with known history of RA    MRI cervical spine 07/31/2018:    There is is note of motion at the atlantodental level with 5mm atlantodental inverval in flexion that reduces to 1-2 mm in extension.  Mild retrolisthesis of C4 on C5 and anterolisthesis of C5- on C6.  Disc extrusion at C6-7 with mild central canal stenosis    Xray left shoulder 2/5/2018 Humeral head is elevated.  Glenohumeral joint arthritis.    Reports reviewed:    Xray right shoulder 08/20/2020 [facility]  Impression  Extensive erosive changes of the humeral head and degenerative changes of the glenohumeral joint.  Findings are concerning for inflammatory arthropathy such as rheumatoid arthritis.      Xray cervical spine 11/14/2018  1. No acute fracture  2. Persistent motion at the C1-2 joint as before, suggesting atlantoaxial instability  3. Minimal instability noted at C5-6 level as described.    MRI cervical spine 07/31/2018  1. Widening of the atlantodental interal in neutral and flexion positions with a 5mm space which reduces to 1-2 mm in extension  2. 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 Rheumatoid arthritis involving multiple sites with positive rheumatoid factor (HCC)  M05.79   2. Arthritis of glenohumeral joint  M19.019   3. Chronic, continuous use of opioids  F11.90   4. Depression, unspecified  depression type  F32.A   5. 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 female with rheumatoid arthritis    Mary was seen today for follow-up.    Orders and management for this visit:    Orders Placed This Encounter   • Patient has been identified as having a positive depression screening. Appropriate orders and counseling have been given.   • oxyCODONE-acetaminophen (PERCOCET) 7.5-325 MG per tablet   • oxyCODONE-acetaminophen (PERCOCET) 7.5-325 MG per tablet   • Consent for Opiate Prescription   • Controlled Substance Treatment Agreement     1. Discussed that symptoms and that she is maintaining her level of function with the use of percocet.  Most recent UDS reviewed, discussed that she has been out of her pain medications due to appointment after date of visit over the weekend.  2.  reviewed.  Consents on file.  Plan to continue to monitor.  No change to current dose.  Refills given.  3. Avoid use of NSAIDs.  Continue topical diclofenac gel  4. She would like to continue conservative care for cervical spine, will follow-up with Dr. Valdovinos   5. Continue psychology and psychiatry.  She denies suicidality, but is having difficulty with current ongoing medical condition         In prescribing controlled substances to this patient, I certify that I have obtained and reviewed the medical history of Mary Martinez. I have also made a good aristides effort to obtain applicable records from other providers who have treated the patient and records did not demonstrate any increased risk of substance abuse that would prevent me from prescribing controlled substances.     I have conducted a physical exam and documented it. I have reviewed Ms. Martinez’s prescription history as maintained by the [place] Prescription Monitoring Program.   ORT 4, indicates moderate risk    I have assessed the patient’s risk for abuse, dependency, and addiction using the validated Opioid  Risk Tool available at https://www.mdcalc.com/jwpkvd-pmde-vwat-ort-narcotic-abuse.     Given the above, I believe the benefits of controlled substance therapy outweigh the risks. The reasons for prescribing controlled substances include non-narcotic, oral analgesic alternatives have been inadequate for pain control and in my professional opinion, controlled substances are the only reasonable choice for this patient because her pain was note adequately controlled with alternatives and she has chronic progressive disease. Accordingly, I have discussed the risk and benefits, treatment plan, and alternative therapies with the patient.       Follow up: 2 months      Please note that this dictation was created using voice recognition software. I have made every reasonable attempt to correct obvious errors but there may be errors of grammar and content that I may have overlooked prior to finalization of this note.      Jayy Kerr MD  Interventional Spine and Sports Physiatry  Physical Medicine and Rehabilitation  [facility]      PCP: Dr. Fidencio Christopher/[facility]

## 2021-12-15 ENCOUNTER — APPOINTMENT (OUTPATIENT)
Dept: RADIOLOGY | Facility: MEDICAL CENTER | Age: 51
End: 2021-12-15
Attending: EMERGENCY MEDICINE
Payer: MEDICAID

## 2021-12-15 ENCOUNTER — HOSPITAL ENCOUNTER (EMERGENCY)
Facility: MEDICAL CENTER | Age: 51
End: 2021-12-15
Attending: EMERGENCY MEDICINE
Payer: MEDICAID

## 2021-12-15 VITALS
HEIGHT: 63 IN | WEIGHT: 106.92 LBS | SYSTOLIC BLOOD PRESSURE: 102 MMHG | BODY MASS INDEX: 18.95 KG/M2 | DIASTOLIC BLOOD PRESSURE: 67 MMHG | RESPIRATION RATE: 17 BRPM | HEART RATE: 91 BPM | TEMPERATURE: 97.7 F | OXYGEN SATURATION: 95 %

## 2021-12-15 DIAGNOSIS — M25.522 LEFT ELBOW PAIN: ICD-10-CM

## 2021-12-15 PROCEDURE — 700102 HCHG RX REV CODE 250 W/ 637 OVERRIDE(OP): Performed by: EMERGENCY MEDICINE

## 2021-12-15 PROCEDURE — 700111 HCHG RX REV CODE 636 W/ 250 OVERRIDE (IP): Performed by: EMERGENCY MEDICINE

## 2021-12-15 PROCEDURE — A9270 NON-COVERED ITEM OR SERVICE: HCPCS | Performed by: EMERGENCY MEDICINE

## 2021-12-15 PROCEDURE — 99284 EMERGENCY DEPT VISIT MOD MDM: CPT

## 2021-12-15 PROCEDURE — 73080 X-RAY EXAM OF ELBOW: CPT | Mod: LT

## 2021-12-15 RX ORDER — OXYCODONE HYDROCHLORIDE AND ACETAMINOPHEN 5; 325 MG/1; MG/1
1 TABLET ORAL ONCE
Status: COMPLETED | OUTPATIENT
Start: 2021-12-15 | End: 2021-12-15

## 2021-12-15 RX ORDER — ONDANSETRON 4 MG/1
4 TABLET, ORALLY DISINTEGRATING ORAL ONCE
Status: COMPLETED | OUTPATIENT
Start: 2021-12-15 | End: 2021-12-15

## 2021-12-15 RX ADMIN — ONDANSETRON 4 MG: 4 TABLET, ORALLY DISINTEGRATING ORAL at 08:44

## 2021-12-15 RX ADMIN — OXYCODONE AND ACETAMINOPHEN 1 TABLET: 5; 325 TABLET ORAL at 09:29

## 2021-12-15 ASSESSMENT — FIBROSIS 4 INDEX: FIB4 SCORE: 0.32

## 2021-12-15 ASSESSMENT — LIFESTYLE VARIABLES
DO YOU DRINK ALCOHOL: YES
HAVE YOU EVER FELT YOU SHOULD CUT DOWN ON YOUR DRINKING: NO

## 2021-12-15 NOTE — ED TRIAGE NOTES
"Chief Complaint   Patient presents with   • Elbow Pain     Left, tripped over cat yesterday, hit elbow on door jamb   • Nausea     /77   Pulse 93   Temp 36.7 °C (98 °F) (Temporal)   Resp 15   Ht 1.6 m (5' 3\")   Wt 48.5 kg (106 lb 14.8 oz)   LMP 09/21/2011   SpO2 98%   BMI 18.94 kg/m²       "

## 2021-12-15 NOTE — ED PROVIDER NOTES
ED Provider Note    CHIEF COMPLAINT  Chief Complaint   Patient presents with   • Elbow Pain     Left, tripped over cat yesterday, hit elbow on door jamb   • Nausea       HPI  Mary Martinez is a 51 y.o. female who presents complaining of an inversion injury to the left elbow, which occurred yesterday after tripping over cat and hitting on door jam.   There is pain and swelling at the lateral aspect of the elbow.  She is able to move it.    REVIEW OF SYSTEMS  See HPI for further details. All other systems are negative.     PAST MEDICAL HISTORY   has a past medical history of Arthritis, ASTHMA, Bowel habit changes, Bronchitis (last approx 2018), Chronic pain (04/24/2020), Dental disorder, Heart burn, Hiatus hernia syndrome, History of pancreatitis, Indigestion, Pain, Pneumonia (10/2019), Psychiatric problem, and Rheumatoid arthritis(714.0) (2003).    SOCIAL HISTORY   reports that she has been smoking cigarettes. She has a 30.00 pack-year smoking history. She has never used smokeless tobacco. She reports that she does not drink alcohol and does not use drugs.    SURGICAL HISTORY   has a past surgical history that includes abdominal abscess drainage (9/27/2011); toe fusion (Right, 5/27/2015); bone spur excision (5/27/2015); hammertoe correction (5/27/2015); foot reconstruction rheumatic (Left, 7/29/2015); arthrodesis (Right, 5/6/2016); pip arthrodesis (Right, 8/29/2016); bone graft (Right, 8/29/2016); irrigation & debridement ortho (Right, 9/11/2016); finger amputation (Right, 9/14/2016); finger arthroplasty (Right, 4/17/2017); finger arthroplasty (Right, 6/5/2017); finger amputation (6/5/2017); exploratory of abdomen (N/A, 10/4/2019); tonsillectomy (1982); primary c section (1991); repeat c section (1999); repeat c section (2005); repeat c section (2008); appendectomy (2011); nicholas by laparoscopy (9/27/2011); wrist exploration (Left, 1980's); colonoscopy (2018); dental extraction(s) (2014); endoscopic us exam,  "esoph (4/29/2020); gastroscopy-endo (4/29/2020); and egd with asp/bx (4/29/2020).    CURRENT MEDICATIONS  Home Medications    **Home medications have not yet been reviewed for this encounter**         ALLERGIES  Allergies   Allergen Reactions   • Penicillins Anaphylaxis and Hives     Tolerates cephalosporins; reports throat swelling with PCN   • Aripiprazole Nausea     Spasms, shaking     • Nitrous Oxide Vomiting       PHYSICAL EXAM  VITAL SIGNS: /77   Pulse 93   Temp 36.7 °C (98 °F) (Temporal)   Resp 15   Ht 1.6 m (5' 3\")   Wt 48.5 kg (106 lb 14.8 oz)   LMP 09/21/2011   SpO2 98%   BMI 18.94 kg/m²   Constitutional: Alert in no apparent distress.  HENT: Normocephalic, Atraumatic, Bilateral external ears normal. Nose normal.   Eyes: Pupils are equal and reactive. Conjunctiva normal, non-icteric.   Thorax & Lungs: Easy unlabored respirations  Abdomen:  No gross signs of peritonitis no pain with movement   Skin: Visualized skin is Warm, Dry, No erythema, No rash.   Extremities:   No edema, No asymmetry There is swelling and tenderness over the lateral elbow, full range of motion  Neurologic: Alert, Grossly non-focal.   Psychiatric: Affect and Mood normal      COURSE & MEDICAL DECISION MAKING  Pertinent Labs & Imaging studies reviewed. (See chart for details)  the patient presents with acute elbow pain.  As the patient did not meet criteria for clinical clearance an x-ray was obtained.  DX-ELBOW-COMPLETE 3+ LEFT   Final Result      No radiographic evidence of acute bony injury.      Dorsal soft tissue swelling may indicate contusion or small bursa        Patient placed in sling, given one percocet.      At this time the patient has been placed in a sling and told to rest and elevate the injured ankle, apply ice intermittently. Use crutches without weight bearing until able to comfortable bear partial weight, then progress to full weight bearing.     FINAL IMPRESSION  1. Left elbow pain      The patient was " discharged home with an information sheet on ankle sprain and told to return immediately for numbness, color change or worsening.  The follow-up plan is documented in EPIC and provided in writing to the patient.    Electronically signed by: Dora Arvizu M.D., 12/15/2021 9:38 AM

## 2022-01-04 ENCOUNTER — TELEMEDICINE (OUTPATIENT)
Dept: PHYSICAL MEDICINE AND REHAB | Facility: MEDICAL CENTER | Age: 52
End: 2022-01-04
Payer: MEDICAID

## 2022-01-04 VITALS — HEIGHT: 63 IN | WEIGHT: 105 LBS | BODY MASS INDEX: 18.61 KG/M2

## 2022-01-04 DIAGNOSIS — M05.79 RHEUMATOID ARTHRITIS INVOLVING MULTIPLE SITES WITH POSITIVE RHEUMATOID FACTOR (HCC): ICD-10-CM

## 2022-01-04 DIAGNOSIS — F11.90 CHRONIC, CONTINUOUS USE OF OPIOIDS: ICD-10-CM

## 2022-01-04 DIAGNOSIS — M53.2X1 ATLANTOAXIAL INSTABILITY: ICD-10-CM

## 2022-01-04 DIAGNOSIS — M19.019 ARTHRITIS OF GLENOHUMERAL JOINT: ICD-10-CM

## 2022-01-04 PROCEDURE — 99214 OFFICE O/P EST MOD 30 MIN: CPT | Mod: 95 | Performed by: PHYSICAL MEDICINE & REHABILITATION

## 2022-01-04 RX ORDER — OXYCODONE AND ACETAMINOPHEN 7.5; 325 MG/1; MG/1
1 TABLET ORAL EVERY 4 HOURS PRN
Qty: 150 TABLET | Refills: 0 | Status: SHIPPED | OUTPATIENT
Start: 2022-01-08 | End: 2022-02-02

## 2022-01-04 ASSESSMENT — PATIENT HEALTH QUESTIONNAIRE - PHQ9
SUM OF ALL RESPONSES TO PHQ QUESTIONS 1-9: 9
5. POOR APPETITE OR OVEREATING: 0 - NOT AT ALL
CLINICAL INTERPRETATION OF PHQ2 SCORE: 2

## 2022-01-04 ASSESSMENT — PAIN SCALES - GENERAL: PAINLEVEL: 7=MODERATE-SEVERE PAIN

## 2022-01-04 ASSESSMENT — FIBROSIS 4 INDEX: FIB4 SCORE: 0.32

## 2022-01-04 NOTE — PROGRESS NOTES
"Telemedicine: Established Patient   This evaluation was conducted via [employer] using secure and encrypted videoconferencing technology. The patient was in a private location in the state of [place].    The patient's identity was confirmed and verbal consent was obtained for this virtual visit.  We were unable to establish a vidoconference and this was conducted as a telephone visit.    Subjective:   CC:   Chief Complaint   Patient presents with   • Follow-Up     Shoulder pain       Mary Martinez is a 51 y.o. female presenting for evaluation and management of chronic pain complaints related to musculoskeletal pain with rheumatoid arthritis.    She had a fall and injured her left elbow, she has a plan to see REANNA.  Reports that this is swollen.    She has plans to see Dr. Valdovinos.  Neck pain is \"fine\"  Her right shoulder continues to be the most painful.  Some days are worse than others and she does need more help with ADLs at times as a result.    Percocet 7.5/325 five a day, #150/month, stable dose.  No changes in bowel movements.      ROS  Anxiety improved with increase in dose of seroquel  Otherwise unchanged.    Allergies   Allergen Reactions   • Penicillins Anaphylaxis and Hives     Tolerates cephalosporins; reports throat swelling with PCN   • Aripiprazole Nausea     Spasms, shaking     • Nitrous Oxide Vomiting       Current medicines (including changes today)  Current Outpatient Medications   Medication Sig Dispense Refill   • [START ON 1/8/2022] oxyCODONE-acetaminophen (PERCOCET) 7.5-325 MG per tablet Take 1 Tablet by mouth every four hours as needed for up to 30 days. 150 Tablet 0   • QUEtiapine (SEROQUEL) 25 MG Tab Take 25 mg by mouth 2 times a day as needed for Anxiety.     • ENBREL 50 MG/ML Solution Prefilled Syringe INJECT 50 MG ONCE WEEKLY UNDERNEATH THE SKIN  FOR 28 DAYS     • Naloxone (NARCAN) 4 MG/0.1ML Liquid One spray in one nostril for overdose and call 911. 1 Package 0   • ondansetron " (ZOFRAN ODT) 8 MG TABLET DISPERSIBLE Take 8 mg by mouth every 8 hours as needed.     • QUEtiapine (SEROQUEL) 100 MG Tab Take 100 mg by mouth every evening.     • PARoxetine (PAXIL) 30 MG Tab Take 60 mg by mouth every evening.     • albuterol (VENTOLIN OR PROVENTIL) 108 (90 BASE) MCG/ACT AERS inhalation aerosol Inhale 2 Puffs by mouth every four hours as needed for Shortness of Breath.     • metoclopramide (REGLAN) 10 MG Tab Take 1 tablet by mouth 4 times a day. For abdominal pressure/pain (Patient not taking: Reported on 1/4/2022) 120 tablet 0   • meclizine (ANTIVERT) 25 MG Tab Take 1 tablet by mouth 4 times a day. (Patient not taking: Reported on 1/4/2022) 120 tablet 0     No current facility-administered medications for this visit.       Patient Active Problem List    Diagnosis Date Noted   • Dysthymia 05/07/2021   • Enlarged lymph nodes 05/07/2021   • Hiatal hernia 05/07/2021   • Unintentional weight loss 05/07/2021   • GI bleeding, intractable N/V, hypovolemia, chronic pain syndrome 02/12/2021   • History of rheumatoid arthritis 02/12/2021   • Immunosuppression due to chronic steroid use ([facility]) 02/12/2021   • Hypotension due to hypovolemia and medications 02/12/2021   • Other hydronephrosis 02/12/2021   • Septic shock ([facility]) 10/11/2019   • Hypoxia 10/10/2019   • Acute hypoxemic respiratory failure ([facility]) 10/10/2019   • Abnormal chest x-ray 10/10/2019   • Hypophosphatemia 10/08/2019   • Chronic pain 10/08/2019   • Hoarseness 10/06/2019   • History of perforation of posterior duodenum pancreatitis abscess 10/04/2019   • Hypokalemia 10/04/2019   • 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 No pertinent family history.    She  has a past medical history of Arthritis, ASTHMA, Bowel habit changes, Bronchitis (last approx 2018), Chronic pain (04/24/2020), Dental disorder, Heart burn, Hiatus hernia syndrome, History of pancreatitis, Indigestion, Pain, Pneumonia (10/2019), Psychiatric problem, and Rheumatoid arthritis(714.0) (2003).  She  has a past surgical history that includes abdominal abscess drainage (9/27/2011); toe fusion (Right, 5/27/2015); bone spur excision (5/27/2015); hammertoe correction (5/27/2015); foot reconstruction rheumatic (Left, 7/29/2015); arthrodesis (Right, 5/6/2016); fusion, pip joint, toe (Right, 8/29/2016); bone graft (Right, 8/29/2016); irrigation & debridement ortho (Right, 9/11/2016); finger amputation (Right, 9/14/2016); finger arthroplasty (Right, 4/17/2017); finger arthroplasty (Right, 6/5/2017); finger amputation (6/5/2017); pr exploratory of abdomen (N/A, 10/4/2019); tonsillectomy (1982); primary c section (1991); repeat c section (1999); repeat c section (2005); repeat c section (2008); appendectomy (2011); nicholas by laparoscopy (9/27/2011); wrist exploration (Left, 1980's); colonoscopy (2018); dental extraction(s) (2014); pr endoscopic us exam, esoph (4/29/2020); gastroscopy-endo (4/29/2020); and egd with asp/bx (4/29/2020).       Objective:   Ht 1.6 m (5' 3\")   Wt 47.6 kg (105 lb)   LMP 09/21/2011   BMI 18.60 kg/m²     Physical Exam  Unable to perform due to telephone nature of visit    Assessment and Plan:   The following treatment plan was discussed:     1. Rheumatoid arthritis involving multiple sites with positive rheumatoid factor (HCC)  - oxyCODONE-acetaminophen (PERCOCET) 7.5-325 MG per tablet; Take 1 Tablet by mouth every four hours as needed for up to 30 days.  Dispense: 150 Tablet; Refill: 0  - URINE DRUG SCREEN W/CONF (SEND OUT); " Future    2. Arthritis of glenohumeral joint  - oxyCODONE-acetaminophen (PERCOCET) 7.5-325 MG per tablet; Take 1 Tablet by mouth every four hours as needed for up to 30 days.  Dispense: 150 Tablet; Refill: 0  - URINE DRUG SCREEN W/CONF (SEND OUT); Future    3. Chronic, continuous use of opioids  - oxyCODONE-acetaminophen (PERCOCET) 7.5-325 MG per tablet; Take 1 Tablet by mouth every four hours as needed for up to 30 days.  Dispense: 150 Tablet; Refill: 0  - URINE DRUG SCREEN W/CONF (SEND OUT); Future    4. Atlantoaxial instability      1. Today's visit was converted to a virtual visit due to COVID exposure.  She is so far asymptomatic.  After several failed attempts, the visit was then changed to a telephone visit.  2. Plan to recheck UDS.  Order written. She was out of her medications when last checked.  We had discussed this.  3. Continue percocet 7.5/325 #150/month.  Script given.  Consents on file.  4. Continue activity as tolerated  5. Follow-up with Dr. Valdovinos, Dr. Dela Cruz, PCP, Dr. Christopher.    Telephone visit, duration of visit and telephone coordination of care 20 minutes.      Follow-up: Return in about 4 weeks (around 2/1/2022).

## 2022-01-28 ENCOUNTER — HOSPITAL ENCOUNTER (OUTPATIENT)
Dept: LAB | Facility: MEDICAL CENTER | Age: 52
End: 2022-01-28
Attending: PHYSICAL MEDICINE & REHABILITATION
Payer: MEDICAID

## 2022-01-28 DIAGNOSIS — M19.019 ARTHRITIS OF GLENOHUMERAL JOINT: ICD-10-CM

## 2022-01-28 DIAGNOSIS — F11.90 CHRONIC, CONTINUOUS USE OF OPIOIDS: ICD-10-CM

## 2022-01-28 DIAGNOSIS — M05.79 RHEUMATOID ARTHRITIS INVOLVING MULTIPLE SITES WITH POSITIVE RHEUMATOID FACTOR (HCC): ICD-10-CM

## 2022-01-28 PROCEDURE — 80307 DRUG TEST PRSMV CHEM ANLYZR: CPT

## 2022-01-28 PROCEDURE — G0480 DRUG TEST DEF 1-7 CLASSES: HCPCS

## 2022-01-29 LAB
AMPHET CTO UR CFM-MCNC: NEGATIVE NG/ML
BARBITURATES CTO UR CFM-MCNC: NEGATIVE NG/ML
BENZODIAZ CTO UR CFM-MCNC: NEGATIVE NG/ML
CANNABINOIDS CTO UR CFM-MCNC: NEGATIVE NG/ML
COCAINE CTO UR CFM-MCNC: NEGATIVE NG/ML
DRUG COMMENT 753798: NORMAL
METHADONE CTO UR CFM-MCNC: NEGATIVE NG/ML
OPIATES CTO UR CFM-MCNC: POSITIVE NG/ML
PCP CTO UR CFM-MCNC: NEGATIVE NG/ML
PROPOXYPH CTO UR CFM-MCNC: NEGATIVE NG/ML

## 2022-01-31 LAB
6MAM UR CFM-MCNC: <10 NG/ML
CODEINE UR CFM-MCNC: <20 NG/ML
HYDROCODONE UR CFM-MCNC: <20 NG/ML
HYDROMORPHONE UR CFM-MCNC: <20 NG/ML
MORPHINE UR CFM-MCNC: <20 NG/ML
NORHYDROCODONE UR CFM-MCNC: <20 NG/ML
NOROXYCODONE UR CFM-MCNC: 1951 NG/ML
OPIATES UR NOROXYM Q0836: 48 NG/ML
OXYCODONE UR CFM-MCNC: 858 NG/ML
OXYMORPHONE UR CFM-MCNC: <20 NG/ML

## 2022-02-02 ENCOUNTER — OFFICE VISIT (OUTPATIENT)
Dept: PHYSICAL MEDICINE AND REHAB | Facility: MEDICAL CENTER | Age: 52
End: 2022-02-02
Payer: MEDICAID

## 2022-02-02 VITALS
HEART RATE: 124 BPM | TEMPERATURE: 97.9 F | BODY MASS INDEX: 18.4 KG/M2 | DIASTOLIC BLOOD PRESSURE: 66 MMHG | WEIGHT: 103.84 LBS | SYSTOLIC BLOOD PRESSURE: 98 MMHG | OXYGEN SATURATION: 98 % | HEIGHT: 63 IN

## 2022-02-02 DIAGNOSIS — M19.019 ARTHRITIS OF GLENOHUMERAL JOINT: ICD-10-CM

## 2022-02-02 DIAGNOSIS — M05.79 RHEUMATOID ARTHRITIS INVOLVING MULTIPLE SITES WITH POSITIVE RHEUMATOID FACTOR (HCC): ICD-10-CM

## 2022-02-02 DIAGNOSIS — F32.A DEPRESSION, UNSPECIFIED DEPRESSION TYPE: ICD-10-CM

## 2022-02-02 DIAGNOSIS — F11.90 CHRONIC, CONTINUOUS USE OF OPIOIDS: ICD-10-CM

## 2022-02-02 DIAGNOSIS — M85.80 OSTEOPENIA, UNSPECIFIED LOCATION: ICD-10-CM

## 2022-02-02 PROCEDURE — 99214 OFFICE O/P EST MOD 30 MIN: CPT | Performed by: PHYSICAL MEDICINE & REHABILITATION

## 2022-02-02 RX ORDER — OXYCODONE AND ACETAMINOPHEN 10; 325 MG/1; MG/1
1 TABLET ORAL EVERY 4 HOURS PRN
Qty: 150 TABLET | Refills: 0 | Status: ON HOLD | OUTPATIENT
Start: 2022-03-04 | End: 2022-02-22

## 2022-02-02 RX ORDER — OXYCODONE AND ACETAMINOPHEN 10; 325 MG/1; MG/1
1 TABLET ORAL EVERY 4 HOURS PRN
Qty: 150 TABLET | Refills: 0 | Status: SHIPPED | OUTPATIENT
Start: 2022-02-02 | End: 2022-03-04

## 2022-02-02 ASSESSMENT — PAIN SCALES - GENERAL: PAINLEVEL: 7=MODERATE-SEVERE PAIN

## 2022-02-02 ASSESSMENT — FIBROSIS 4 INDEX: FIB4 SCORE: 0.32

## 2022-02-02 ASSESSMENT — PATIENT HEALTH QUESTIONNAIRE - PHQ9
SUM OF ALL RESPONSES TO PHQ QUESTIONS 1-9: 13
CLINICAL INTERPRETATION OF PHQ2 SCORE: 2
5. POOR APPETITE OR OVEREATING: 3 - NEARLY EVERY DAY

## 2022-02-02 NOTE — PROGRESS NOTES
Follow up patient note  Pain Medicine, Interventional spine and sports physiatry, Physical medicine rehabilitation    Date of Service:02/02/2022    Chief complaint:   Joint pain and neck pain      HISTORY    Please see new patient note dated 06/08/2018 by Dr Kerr,  for more details.     HPI  Patient identification: Mary Martinez 51 y.o. female returns for follow-up of her pain related to rheumatoid arthritis.      Interval history:   Since the last visit, she has seen Dr. Valdovinos, who she reports says that she has a new fracture in the cervical spine.  She has been hesitant regarding surgical management of the entire axial instability.  I do not have his records from the most recent visit.    Medications are not as effective now.  Taking percocet 7.5/325 five a day.  Using diclofenac gel for knees and right shoulder. Using this about 2-3 times a week.  Pain is 7/10 on the NRS    Right shoulder pain has been very severe.  She is not sure what could have made this worse.  This is primarily painful when she uses her right shoulder.  She is able to perform some ADL tasks with her left hand.  An example is brushing her hair.  Family is present on site when she has when she takes a shower but she is not requiring assistance for her ADLs at this time.    She also reports that she has a hiatal hernia and surgery has been recommended.    Dr. Dela Cruz is her Rheumatologist.    No  bowel or bladder changes.  Bowel movements are regular.  She took ibuprofen this weekend, due to being out of her medications.  Feeling more anxious since medication ran out.      PHQ-9: 13, denies suicidality   ORT: 4    ROS  See HPI    Red Flags :   Fever, Chills, Sweats: Denies  Involuntary Weight Loss: Denies  Bowel/Bladder Incontinence: Denies      PMHx:   Past Medical History:   Diagnosis Date   • Arthritis     Rheumatoid -follows with rheumatologist. Has several fractures due to RA.   • ASTHMA     inhalers prn   • Bowel habit changes      4/24/20-constipation and diarrhea.   • Bronchitis last approx 2018   • Chronic pain 04/24/2020    Due to RA   • Dental disorder     no teeth upper-does not wear her denture. Broken teeth on bottom.   • Heart burn     taking famotidine   • Hiatus hernia syndrome    • History of pancreatitis    • Indigestion     taking famotidine   • Pain     everywhere   • Pneumonia 10/2019   • Psychiatric problem     anxiety and depression   • Rheumatoid arthritis(714.0) 2003       PSHx:   Past Surgical History:   Procedure Laterality Date   • RI ENDOSCOPIC US EXAM, ESOPH  4/29/2020    Procedure: EGD, WITH ENDOSCOPIC US;  Surgeon: Jorge Brooke M.D.;  Location: [facility];  Service: Gastroenterology   • GASTROSCOPY-ENDO  4/29/2020    Procedure: GASTROSCOPY;  Surgeon: Jorge Brooke M.D.;  Location: [facility];  Service: Gastroenterology   • EGD WITH ASP/BX  4/29/2020    Procedure: EGD, WITH ASPIRATION BIOPSY - POSS FNA;  Surgeon: Jorge Brooke M.D.;  Location: [facility];  Service: Gastroenterology   • RI EXPLORATORY OF ABDOMEN N/A 10/4/2019    Procedure: LAPAROTOMY, EXPLORATORY AND REPAIR OF DUODENAL PERFORATION;  Surgeon: James Dumont M.D.;  Location: [facility];  Service: General   • COLONOSCOPY  2018    with upper endoscopy   • FINGER ARTHROPLASTY Right 6/5/2017    Procedure: FINGER ARTHROPLASTY - LONG, RING AND SMALL VOLAR PLATE;  Surgeon: Lobo Rosen M.D.;  Location: SURGERY SAME DAY [facility];  Service:    • FINGER AMPUTATION  6/5/2017    Procedure: FINGER AMPUTATION - LONG, RING AND SMALL AT THE PROXIMAL INTERPHALANGEAL JOINT;  Surgeon: Lobo Rosen M.D.;  Location: SURGERY SAME DAY [facility];  Service:    • FINGER ARTHROPLASTY Right 4/17/2017    Procedure: FINGER ARTHROPLASTY ;  Surgeon: Lobo Rosen M.D.;  Location: SURGERY SAME DAY [facility];  Service:    • FINGER AMPUTATION Right 9/14/2016     Procedure: FINGER AMPUTATION INDEX;  Surgeon: Lobo Rosen M.D.;  Location: SURGERY SAME DAY [facility];  Service:    • IRRIGATION & DEBRIDEMENT ORTHO Right 9/11/2016    Procedure: IRRIGATION & DEBRIDEMENT ORTHO - right index finger;  Surgeon: Madhu Rosen M.D.;  Location: SURGERY [facility];  Service:    • FUSION, PIP JOINT, TOE Right 8/29/2016    Procedure: RE-DO INDEX FINGER PROXIMAL INTERPHALANGEAL ARTHRODESIS;  Surgeon: Lobo Rosen M.D.;  Location: SURGERY SAME DAY [facility];  Service:    • BONE GRAFT Right 8/29/2016    Procedure: BONE GRAFT - DISTAL RADIUS ;  Surgeon: Lobo Rosen M.D.;  Location: SURGERY SAME DAY [facility];  Service:    • ARTHRODESIS Right 5/6/2016    Procedure: ARTHRODESIS INDEX FINGER PROXIMAL INTERPHALANGEAL;  Surgeon: Lobo Rosen M.D.;  Location: SURGERY SAME DAY [facility];  Service:    • FOOT RECONSTRUCTION RHEUMATIC Left 7/29/2015    Procedure: FOOT RECONSTRUCTION RHEUMATIC;  Surgeon: Heriberto Alves M.D.;  Location: [facility];  Service:    • TOE FUSION Right 5/27/2015    Procedure: TOE FUSION 1ST METATARSALPHALANGEAL JOINT;  Surgeon: Heriberto Alves M.D.;  Location: SURGERY [facility];  Service:    • BONE SPUR EXCISION  5/27/2015    Procedure: BONE SPUR EXCISION METATARSAL HEAD 2-3;  Surgeon: Heriberto Alves M.D.;  Location: [facility];  Service:    • HAMMERTOE CORRECTION  5/27/2015    Procedure: HAMMERTOE CORRECTION AND SOFT TISSUE RE-ALINGMENT 2-3 ;  Surgeon: Heriberto Alves M.D.;  Location: SURGERY [facility];  Service:    • DENTAL EXTRACTION(S)  2014    all of upper teeth   • ABDOMINAL ABSCESS DRAINAGE  9/27/2011    Performed by VERO WRIGHT at [facility]   • EMANUEL BY LAPAROSCOPY  9/27/2011    Performed by VERO WRIGHT at [facility]   • APPENDECTOMY  2011    Found out it had ruptured prior to abcess surgery   • REPEAT C SECTION  2008   • REPEAT C  SECTION  2005   • REPEAT C SECTION  1999   • PRIMARY C SECTION  1991   • TONSILLECTOMY  1982   • WRIST EXPLORATION Left 1980's    fractured wrist-no hardware       Family history   History reviewed. No pertinent family history.      Medications:   Outpatient Medications Marked as Taking for the 2/2/22 encounter (Office Visit) with Jayy Kerr M.D.   Medication Sig Dispense Refill   • oxyCODONE-acetaminophen (PERCOCET-10)  MG Tab Take 1 Tablet by mouth every four hours as needed for Severe Pain for up to 30 days. 150 Tablet 0   • [START ON 3/4/2022] oxyCODONE-acetaminophen (PERCOCET-10)  MG Tab Take 1 Tablet by mouth every four hours as needed for Severe Pain for up to 30 days. 150 Tablet 0   • ENBREL 50 MG/ML Solution Prefilled Syringe INJECT 50 MG ONCE WEEKLY UNDERNEATH THE SKIN  FOR 28 DAYS     • QUEtiapine (SEROQUEL) 100 MG Tab Take 100 mg by mouth every evening.     • PARoxetine (PAXIL) 30 MG Tab Take 60 mg by mouth every evening.     • albuterol (VENTOLIN OR PROVENTIL) 108 (90 BASE) MCG/ACT AERS inhalation aerosol Inhale 2 Puffs by mouth every four hours as needed for Shortness of Breath.         Allergies:   Allergies   Allergen Reactions   • Penicillins Anaphylaxis and Hives     Tolerates cephalosporins; reports throat swelling with PCN   • Aripiprazole Nausea     Spasms, shaking     • Nitrous Oxide Vomiting       Social Hx:   Social History     Socioeconomic History   • Marital status:      Spouse name: Not on file   • Number of children: Not on file   • Years of education: Not on file   • Highest education level: Not on file   Occupational History   • Not on file   Tobacco Use   • Smoking status: Current Every Day Smoker     Packs/day: 1.00     Years: 30.00     Pack years: 30.00     Types: Cigarettes   • Smokeless tobacco: Never Used   Vaping Use   • Vaping Use: Never used   Substance and Sexual Activity   • Alcohol use: Never   • Drug use: Never   • Sexual activity: Not on file  "  Other Topics Concern   •  Service No   • Blood Transfusions Yes   • Caffeine Concern No   • Occupational Exposure No   • Hobby Hazards No   • Sleep Concern No   • Stress Concern Yes   • Weight Concern No   • Special Diet No   • Back Care No   • Exercise Yes   • Bike Helmet Yes   • Seat Belt Yes   • Self-Exams Yes   Social History Narrative    ** Merged History Encounter **          Social Determinants of Health     Financial Resource Strain:    • Difficulty of Paying Living Expenses: Not on file   Food Insecurity:    • Worried About Running Out of Food in the Last Year: Not on file   • Ran Out of Food in the Last Year: Not on file   Transportation Needs:    • Lack of Transportation (Medical): Not on file   • Lack of Transportation (Non-Medical): Not on file   Physical Activity:    • Days of Exercise per Week: Not on file   • Minutes of Exercise per Session: Not on file   Stress:    • Feeling of Stress : Not on file   Social Connections:    • Frequency of Communication with Friends and Family: Not on file   • Frequency of Social Gatherings with Friends and Family: Not on file   • Attends Gnosticist Services: Not on file   • Active Member of Clubs or Organizations: Not on file   • Attends Club or Organization Meetings: Not on file   • Marital Status: Not on file   Intimate Partner Violence:    • Fear of Current or Ex-Partner: Not on file   • Emotionally Abused: Not on file   • Physically Abused: Not on file   • Sexually Abused: Not on file   Housing Stability:    • Unable to Pay for Housing in the Last Year: Not on file   • Number of Places Lived in the Last Year: Not on file   • Unstable Housing in the Last Year: Not on file         EXAMINATION     Physical Exam:   Vitals: BP (!) 98/66 (BP Location: Right arm, Patient Position: Sitting, BP Cuff Size: Adult)   Pulse (!) 124   Temp 36.6 °C (97.9 °F) (Temporal)   Ht 1.6 m (5' 3\")   Wt 47.1 kg (103 lb 13.4 oz)   SpO2 98%     Constitutional:   Body Habitus: " Body mass index is 18.39 kg/m².  Cooperation: Fully cooperates with exam  Appearance: Appears pale, She is wearing a mask  Respiratory-  breathing comfortable on room air, no audible wheezing  Cardiovascular- no lower extremity edema is observed.     Psychiatric- alert and oriented ×3. Normal affect.     Musculoskeletal:  Right shoulder with crepitus and pain with ROM less than 80 degrees before pain worsens with full abduction on the left    Partial hand amputation on the right at the MCPs; ulnar deviation on the left with MCP subluxation, mild atrophy of the hand intrinsics with wasting of the thenar eminence.     Gait is steady without assist device.  Mildly antalgic with increased knee flexion, particularly on the right, during gait.      MEDICAL DECISION MAKING    DATA    Labs: UDS 10/30/2018 consistent with medications.  Oxycodone and metabolites without other substances   UDS 04/19/2019 consistent with medications. Oxycodone and metabolites without other substances   UDS 07/19/2019 consistent with medications.  Oxycodone and metabolites without other substances   UDS 11/22/2019 consistent with medications.  Oxycodone and metabolites without other substances   UDS 02/20/2020 absent for Oxycodone and metabolites without other substances  UDS 06/10/2020 positive for oxycodone and metabolites without other substances  UDS 08/18/2020 positive for oxycodone and metabolites, positive for EtG without EtS  UDS 09/07/2021 negative for oxycodone and metabolites, patient was out of medication, no other abnormalities    Imaging:    Films reviewed.  These are my reads:    Xray right shoulder 08/20/2020  There is note of severe degenerative change of the glenohumeral joint.  No fracture.  Erosive changes, consistent with known history of RA    MRI cervical spine 07/31/2018:    There is is note of motion at the atlantodental level with 5mm atlantodental inverval in flexion that reduces to 1-2 mm in extension.  Mild  retrolisthesis of C4 on C5 and anterolisthesis of C5- on C6.  Disc extrusion at C6-7 with mild central canal stenosis    Xray left shoulder 2/5/2018 Humeral head is elevated.  Glenohumeral joint arthritis.    Reports reviewed:    Xray right shoulder 08/20/2020 [facility]  Impression  Extensive erosive changes of the humeral head and degenerative changes of the glenohumeral joint.  Findings are concerning for inflammatory arthropathy such as rheumatoid arthritis.      Xray cervical spine 11/14/2018  1. No acute fracture  2. Persistent motion at the C1-2 joint as before, suggesting atlantoaxial instability  3. Minimal instability noted at C5-6 level as described.    MRI cervical spine 07/31/2018  1. Widening of the atlantodental interal in neutral and flexion positions with a 5mm space which reduces to 1-2 mm in extension  2. 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 Rheumatoid arthritis involving multiple sites with positive rheumatoid factor (HCC)  M05.79   2. Arthritis of glenohumeral joint  M19.019   3. Chronic, continuous use of opioids  F11.90   4. Depression, unspecified depression type  F32.A   5. Osteopenia, unspecified location  M85.80         ASSESSMENT and PLAN:     Mary Estes Juan 51 y.o. female with rheumatoid arthritis    Mary was seen today for follow-up.    Orders and management for this visit:    Orders Placed This Encounter   • Patient has been identified as having a positive depression screening. Appropriate orders and counseling have been given.   • oxyCODONE-acetaminophen (PERCOCET-10)  MG Tab   • oxyCODONE-acetaminophen (PERCOCET-10)  MG Tab   • Consent for Opiate Prescription   • Controlled Substance Treatment Agreement     1.  Discussed that her  symptoms have been worsening as relates to her neck and right shoulder.  Her pain medication is no longer providing adequate pain relief.   reviewed.  Most recent UDS reviewed.  We discussed trial of increasing to Percocet 10/325 #150/month.  We discussed that she may have some increased constipation.  She denies constipation currently is not taking over-the-counter medications regular bowel movements.  2.  Discussed follow-up with Dr. Christopher and possible repeat DEXA scan.  Last scan showed osteopenia in 2019.  3.  She reports that Dr. Valdovinos has told her she has a new fracture.  I requested his records from that visit on January 28, 2022.  From her report he also suggested surgery and possible cervical epidural.  We discussed that I would like to review his records but did discuss cervical epidural in general.  She is pretty emphatic about not wanting to have this done.  We agreed to reassess this depending on Dr. Valdovinos's report.  4.  Discussed her use of diclofenac gel.  She is avoiding taking oral NSAIDs on a regular basis.  Discussed that she can use the diclofenac gel up to 4 times a day and encouraged her to increase this.  5. Continue psychology and psychiatry.  She denies suicidality, but is having difficulty with current ongoing medical condition         In prescribing controlled substances to this patient, I certify that I have obtained and reviewed the medical history of Mary Martinez. I have also made a good aristides effort to obtain applicable records from other providers who have treated the patient and records did not demonstrate any increased risk of substance abuse that would prevent me from prescribing controlled substances.     I have conducted a physical exam and documented it. I have reviewed Ms. Martinez’s prescription history as maintained by the [place] Prescription Monitoring Program.   ORT 4, indicates moderate risk    I have assessed the patient’s risk for abuse, dependency, and  addiction using the validated Opioid Risk Tool available at https://www.mdcalc.com/shxcjc-rxaa-yzei-ort-narcotic-abuse.     Given the above, I believe the benefits of controlled substance therapy outweigh the risks. The reasons for prescribing controlled substances include non-narcotic, oral analgesic alternatives have been inadequate for pain control and in my professional opinion, controlled substances are the only reasonable choice for this patient because her pain was note adequately controlled with alternatives and she has chronic progressive disease. Accordingly, I have discussed the risk and benefits, treatment plan, and alternative therapies with the patient.       Follow up: 2 months      Please note that this dictation was created using voice recognition software. I have made every reasonable attempt to correct obvious errors but there may be errors of grammar and content that I may have overlooked prior to finalization of this note.      Jayy Kerr MD  Interventional Spine and Sports Physiatry  Physical Medicine and Rehabilitation  [facility]      PCP: Dr. Fidencio Christopher/[facility]

## 2022-02-19 ENCOUNTER — HOSPITAL ENCOUNTER (EMERGENCY)
Facility: MEDICAL CENTER | Age: 52
End: 2022-02-19
Attending: EMERGENCY MEDICINE
Payer: MEDICAID

## 2022-02-19 ENCOUNTER — APPOINTMENT (OUTPATIENT)
Dept: RADIOLOGY | Facility: MEDICAL CENTER | Age: 52
End: 2022-02-19
Attending: EMERGENCY MEDICINE
Payer: MEDICAID

## 2022-02-19 VITALS
SYSTOLIC BLOOD PRESSURE: 108 MMHG | TEMPERATURE: 97 F | HEART RATE: 82 BPM | RESPIRATION RATE: 18 BRPM | BODY MASS INDEX: 18.36 KG/M2 | HEIGHT: 63 IN | OXYGEN SATURATION: 97 % | WEIGHT: 103.62 LBS | DIASTOLIC BLOOD PRESSURE: 81 MMHG

## 2022-02-19 DIAGNOSIS — R10.9 ACUTE ABDOMINAL PAIN: ICD-10-CM

## 2022-02-19 DIAGNOSIS — L02.91 ABSCESS: ICD-10-CM

## 2022-02-19 LAB
ALBUMIN SERPL BCP-MCNC: 4.2 G/DL (ref 3.2–4.9)
ALBUMIN/GLOB SERPL: 0.9 G/DL
ALP SERPL-CCNC: 158 U/L (ref 30–99)
ALT SERPL-CCNC: 6 U/L (ref 2–50)
ANION GAP SERPL CALC-SCNC: 15 MMOL/L (ref 7–16)
AST SERPL-CCNC: 10 U/L (ref 12–45)
BASOPHILS # BLD AUTO: 0.6 % (ref 0–1.8)
BASOPHILS # BLD: 0.07 K/UL (ref 0–0.12)
BILIRUB SERPL-MCNC: 0.2 MG/DL (ref 0.1–1.5)
BUN SERPL-MCNC: 13 MG/DL (ref 8–22)
CALCIUM SERPL-MCNC: 9.7 MG/DL (ref 8.4–10.2)
CHLORIDE SERPL-SCNC: 105 MMOL/L (ref 96–112)
CO2 SERPL-SCNC: 16 MMOL/L (ref 20–33)
CREAT SERPL-MCNC: 0.87 MG/DL (ref 0.5–1.4)
EOSINOPHIL # BLD AUTO: 0.11 K/UL (ref 0–0.51)
EOSINOPHIL NFR BLD: 0.9 % (ref 0–6.9)
ERYTHROCYTE [DISTWIDTH] IN BLOOD BY AUTOMATED COUNT: 55.8 FL (ref 35.9–50)
GLOBULIN SER CALC-MCNC: 4.8 G/DL (ref 1.9–3.5)
GLUCOSE SERPL-MCNC: 94 MG/DL (ref 65–99)
HCG SERPL QL: NEGATIVE
HCT VFR BLD AUTO: 41.8 % (ref 37–47)
HGB BLD-MCNC: 13.5 G/DL (ref 12–16)
IMM GRANULOCYTES # BLD AUTO: 0.04 K/UL (ref 0–0.11)
IMM GRANULOCYTES NFR BLD AUTO: 0.3 % (ref 0–0.9)
LACTATE BLD-SCNC: 1.3 MMOL/L (ref 0.5–2)
LIPASE SERPL-CCNC: 33 U/L (ref 7–58)
LYMPHOCYTES # BLD AUTO: 1.83 K/UL (ref 1–4.8)
LYMPHOCYTES NFR BLD: 15.5 % (ref 22–41)
MCH RBC QN AUTO: 32.1 PG (ref 27–33)
MCHC RBC AUTO-ENTMCNC: 32.3 G/DL (ref 33.6–35)
MCV RBC AUTO: 99.3 FL (ref 81.4–97.8)
MONOCYTES # BLD AUTO: 0.41 K/UL (ref 0–0.85)
MONOCYTES NFR BLD AUTO: 3.5 % (ref 0–13.4)
NEUTROPHILS # BLD AUTO: 9.35 K/UL (ref 2–7.15)
NEUTROPHILS NFR BLD: 79.2 % (ref 44–72)
NRBC # BLD AUTO: 0 K/UL
NRBC BLD-RTO: 0 /100 WBC
PLATELET # BLD AUTO: 637 K/UL (ref 164–446)
PMV BLD AUTO: 9.5 FL (ref 9–12.9)
POTASSIUM SERPL-SCNC: 4.2 MMOL/L (ref 3.6–5.5)
PROT SERPL-MCNC: 9 G/DL (ref 6–8.2)
RBC # BLD AUTO: 4.21 M/UL (ref 4.2–5.4)
SODIUM SERPL-SCNC: 136 MMOL/L (ref 135–145)
WBC # BLD AUTO: 11.8 K/UL (ref 4.8–10.8)

## 2022-02-19 PROCEDURE — 303977 HCHG I & D

## 2022-02-19 PROCEDURE — 96375 TX/PRO/DX INJ NEW DRUG ADDON: CPT

## 2022-02-19 PROCEDURE — 700117 HCHG RX CONTRAST REV CODE 255: Performed by: EMERGENCY MEDICINE

## 2022-02-19 PROCEDURE — 700101 HCHG RX REV CODE 250: Performed by: EMERGENCY MEDICINE

## 2022-02-19 PROCEDURE — A9270 NON-COVERED ITEM OR SERVICE: HCPCS | Performed by: EMERGENCY MEDICINE

## 2022-02-19 PROCEDURE — 99284 EMERGENCY DEPT VISIT MOD MDM: CPT

## 2022-02-19 PROCEDURE — 700102 HCHG RX REV CODE 250 W/ 637 OVERRIDE(OP): Performed by: EMERGENCY MEDICINE

## 2022-02-19 PROCEDURE — 700111 HCHG RX REV CODE 636 W/ 250 OVERRIDE (IP): Performed by: EMERGENCY MEDICINE

## 2022-02-19 PROCEDURE — 84703 CHORIONIC GONADOTROPIN ASSAY: CPT

## 2022-02-19 PROCEDURE — 83605 ASSAY OF LACTIC ACID: CPT

## 2022-02-19 PROCEDURE — 83690 ASSAY OF LIPASE: CPT

## 2022-02-19 PROCEDURE — 74177 CT ABD & PELVIS W/CONTRAST: CPT

## 2022-02-19 PROCEDURE — 80053 COMPREHEN METABOLIC PANEL: CPT

## 2022-02-19 PROCEDURE — 85025 COMPLETE CBC W/AUTO DIFF WBC: CPT

## 2022-02-19 PROCEDURE — 96374 THER/PROPH/DIAG INJ IV PUSH: CPT

## 2022-02-19 RX ORDER — LIDOCAINE HYDROCHLORIDE 20 MG/ML
JELLY TOPICAL ONCE
Status: COMPLETED | OUTPATIENT
Start: 2022-02-19 | End: 2022-02-19

## 2022-02-19 RX ORDER — ONDANSETRON 2 MG/ML
4 INJECTION INTRAMUSCULAR; INTRAVENOUS ONCE
Status: COMPLETED | OUTPATIENT
Start: 2022-02-19 | End: 2022-02-19

## 2022-02-19 RX ORDER — LIDOCAINE HYDROCHLORIDE AND EPINEPHRINE BITARTRATE 20; .01 MG/ML; MG/ML
10 INJECTION, SOLUTION SUBCUTANEOUS ONCE
Status: COMPLETED | OUTPATIENT
Start: 2022-02-19 | End: 2022-02-19

## 2022-02-19 RX ORDER — OXYCODONE HYDROCHLORIDE AND ACETAMINOPHEN 5; 325 MG/1; MG/1
2 TABLET ORAL ONCE
Status: COMPLETED | OUTPATIENT
Start: 2022-02-19 | End: 2022-02-19

## 2022-02-19 RX ORDER — SULFAMETHOXAZOLE AND TRIMETHOPRIM 800; 160 MG/1; MG/1
1 TABLET ORAL 2 TIMES DAILY
Qty: 10 TABLET | Refills: 0 | Status: ON HOLD | OUTPATIENT
Start: 2022-02-19 | End: 2022-02-21

## 2022-02-19 RX ORDER — MORPHINE SULFATE 4 MG/ML
4 INJECTION INTRAVENOUS ONCE
Status: COMPLETED | OUTPATIENT
Start: 2022-02-19 | End: 2022-02-19

## 2022-02-19 RX ORDER — SULFAMETHOXAZOLE AND TRIMETHOPRIM 800; 160 MG/1; MG/1
1 TABLET ORAL ONCE
Status: COMPLETED | OUTPATIENT
Start: 2022-02-19 | End: 2022-02-19

## 2022-02-19 RX ADMIN — IOHEXOL 100 ML: 350 INJECTION, SOLUTION INTRAVENOUS at 18:42

## 2022-02-19 RX ADMIN — SULFAMETHOXAZOLE AND TRIMETHOPRIM 1 TABLET: 800; 160 TABLET ORAL at 18:44

## 2022-02-19 RX ADMIN — MORPHINE SULFATE 4 MG: 4 INJECTION INTRAVENOUS at 18:44

## 2022-02-19 RX ADMIN — LIDOCAINE HYDROCHLORIDE 1 APPLICATION: 20 JELLY TOPICAL at 18:32

## 2022-02-19 RX ADMIN — LIDOCAINE HYDROCHLORIDE,EPINEPHRINE BITARTRATE 10 ML: 20; .01 INJECTION, SOLUTION INFILTRATION; PERINEURAL at 18:30

## 2022-02-19 RX ADMIN — ONDANSETRON 4 MG: 2 INJECTION INTRAMUSCULAR; INTRAVENOUS at 18:44

## 2022-02-19 RX ADMIN — OXYCODONE AND ACETAMINOPHEN 2 TABLET: 5; 325 TABLET ORAL at 20:02

## 2022-02-19 ASSESSMENT — FIBROSIS 4 INDEX: FIB4 SCORE: 0.32

## 2022-02-19 ASSESSMENT — PAIN DESCRIPTION - PAIN TYPE: TYPE: ACUTE PAIN

## 2022-02-20 ENCOUNTER — HOSPITAL ENCOUNTER (OUTPATIENT)
Facility: MEDICAL CENTER | Age: 52
End: 2022-02-22
Attending: STUDENT IN AN ORGANIZED HEALTH CARE EDUCATION/TRAINING PROGRAM | Admitting: STUDENT IN AN ORGANIZED HEALTH CARE EDUCATION/TRAINING PROGRAM
Payer: MEDICAID

## 2022-02-20 ENCOUNTER — APPOINTMENT (OUTPATIENT)
Dept: RADIOLOGY | Facility: MEDICAL CENTER | Age: 52
End: 2022-02-20
Attending: STUDENT IN AN ORGANIZED HEALTH CARE EDUCATION/TRAINING PROGRAM
Payer: MEDICAID

## 2022-02-20 DIAGNOSIS — E86.0 DEHYDRATION: ICD-10-CM

## 2022-02-20 DIAGNOSIS — R65.10 SIRS (SYSTEMIC INFLAMMATORY RESPONSE SYNDROME) (HCC): ICD-10-CM

## 2022-02-20 DIAGNOSIS — R11.2 INTRACTABLE NAUSEA AND VOMITING: ICD-10-CM

## 2022-02-20 DIAGNOSIS — R10.13 EPIGASTRIC ABDOMINAL PAIN: ICD-10-CM

## 2022-02-20 DIAGNOSIS — R52 INTRACTABLE PAIN: ICD-10-CM

## 2022-02-20 LAB
ALBUMIN SERPL BCP-MCNC: 4 G/DL (ref 3.2–4.9)
ALBUMIN/GLOB SERPL: 0.9 G/DL
ALP SERPL-CCNC: 143 U/L (ref 30–99)
ALT SERPL-CCNC: 5 U/L (ref 2–50)
ANION GAP SERPL CALC-SCNC: 15 MMOL/L (ref 7–16)
ANION GAP SERPL CALC-SCNC: 17 MMOL/L (ref 7–16)
APPEARANCE UR: CLEAR
AST SERPL-CCNC: 12 U/L (ref 12–45)
BACTERIA #/AREA URNS HPF: NEGATIVE /HPF
BASOPHILS # BLD AUTO: 0.5 % (ref 0–1.8)
BASOPHILS # BLD: 0.09 K/UL (ref 0–0.12)
BILIRUB SERPL-MCNC: 0.2 MG/DL (ref 0.1–1.5)
BILIRUB UR QL STRIP.AUTO: NEGATIVE
BUN SERPL-MCNC: 14 MG/DL (ref 8–22)
BUN SERPL-MCNC: 17 MG/DL (ref 8–22)
CALCIUM SERPL-MCNC: 7.4 MG/DL (ref 8.5–10.5)
CALCIUM SERPL-MCNC: 9.4 MG/DL (ref 8.5–10.5)
CHLORIDE SERPL-SCNC: 104 MMOL/L (ref 96–112)
CHLORIDE SERPL-SCNC: 110 MMOL/L (ref 96–112)
CO2 SERPL-SCNC: 11 MMOL/L (ref 20–33)
CO2 SERPL-SCNC: 11 MMOL/L (ref 20–33)
COLOR UR: YELLOW
CREAT SERPL-MCNC: 0.8 MG/DL (ref 0.5–1.4)
CREAT SERPL-MCNC: 1.12 MG/DL (ref 0.5–1.4)
EOSINOPHIL # BLD AUTO: 0.04 K/UL (ref 0–0.51)
EOSINOPHIL NFR BLD: 0.2 % (ref 0–6.9)
EPI CELLS #/AREA URNS HPF: ABNORMAL /HPF
ERYTHROCYTE [DISTWIDTH] IN BLOOD BY AUTOMATED COUNT: 54.8 FL (ref 35.9–50)
GLOBULIN SER CALC-MCNC: 4.4 G/DL (ref 1.9–3.5)
GLUCOSE SERPL-MCNC: 112 MG/DL (ref 65–99)
GLUCOSE SERPL-MCNC: 116 MG/DL (ref 65–99)
GLUCOSE UR STRIP.AUTO-MCNC: NEGATIVE MG/DL
HCT VFR BLD AUTO: 40.6 % (ref 37–47)
HGB BLD-MCNC: 13.5 G/DL (ref 12–16)
HYALINE CASTS #/AREA URNS LPF: ABNORMAL /LPF
IMM GRANULOCYTES # BLD AUTO: 0.1 K/UL (ref 0–0.11)
IMM GRANULOCYTES NFR BLD AUTO: 0.5 % (ref 0–0.9)
KETONES UR STRIP.AUTO-MCNC: NEGATIVE MG/DL
LACTATE BLD-SCNC: 1.1 MMOL/L (ref 0.5–2)
LEUKOCYTE ESTERASE UR QL STRIP.AUTO: NEGATIVE
LIPASE SERPL-CCNC: 24 U/L (ref 11–82)
LYMPHOCYTES # BLD AUTO: 2.01 K/UL (ref 1–4.8)
LYMPHOCYTES NFR BLD: 11 % (ref 22–41)
MCH RBC QN AUTO: 32.1 PG (ref 27–33)
MCHC RBC AUTO-ENTMCNC: 33.3 G/DL (ref 33.6–35)
MCV RBC AUTO: 96.7 FL (ref 81.4–97.8)
MICRO URNS: ABNORMAL
MONOCYTES # BLD AUTO: 0.89 K/UL (ref 0–0.85)
MONOCYTES NFR BLD AUTO: 4.9 % (ref 0–13.4)
NEUTROPHILS # BLD AUTO: 15.16 K/UL (ref 2–7.15)
NEUTROPHILS NFR BLD: 82.9 % (ref 44–72)
NITRITE UR QL STRIP.AUTO: NEGATIVE
NRBC # BLD AUTO: 0 K/UL
NRBC BLD-RTO: 0 /100 WBC
PH UR STRIP.AUTO: 5.5 [PH] (ref 5–8)
PLATELET # BLD AUTO: 666 K/UL (ref 164–446)
PMV BLD AUTO: 9.7 FL (ref 9–12.9)
POTASSIUM SERPL-SCNC: 3.5 MMOL/L (ref 3.6–5.5)
POTASSIUM SERPL-SCNC: 3.8 MMOL/L (ref 3.6–5.5)
PROT SERPL-MCNC: 8.4 G/DL (ref 6–8.2)
PROT UR QL STRIP: 30 MG/DL
RBC # BLD AUTO: 4.2 M/UL (ref 4.2–5.4)
RBC # URNS HPF: ABNORMAL /HPF
RBC UR QL AUTO: NEGATIVE
SODIUM SERPL-SCNC: 132 MMOL/L (ref 135–145)
SODIUM SERPL-SCNC: 136 MMOL/L (ref 135–145)
SP GR UR STRIP.AUTO: 1.04
UROBILINOGEN UR STRIP.AUTO-MCNC: 0.2 MG/DL
WBC # BLD AUTO: 18.3 K/UL (ref 4.8–10.8)
WBC #/AREA URNS HPF: ABNORMAL /HPF

## 2022-02-20 PROCEDURE — 81001 URINALYSIS AUTO W/SCOPE: CPT

## 2022-02-20 PROCEDURE — 74177 CT ABD & PELVIS W/CONTRAST: CPT

## 2022-02-20 PROCEDURE — 80053 COMPREHEN METABOLIC PANEL: CPT

## 2022-02-20 PROCEDURE — 96374 THER/PROPH/DIAG INJ IV PUSH: CPT

## 2022-02-20 PROCEDURE — 99285 EMERGENCY DEPT VISIT HI MDM: CPT

## 2022-02-20 PROCEDURE — 96375 TX/PRO/DX INJ NEW DRUG ADDON: CPT

## 2022-02-20 PROCEDURE — 96376 TX/PRO/DX INJ SAME DRUG ADON: CPT

## 2022-02-20 PROCEDURE — 83690 ASSAY OF LIPASE: CPT

## 2022-02-20 PROCEDURE — 700111 HCHG RX REV CODE 636 W/ 250 OVERRIDE (IP): Performed by: STUDENT IN AN ORGANIZED HEALTH CARE EDUCATION/TRAINING PROGRAM

## 2022-02-20 PROCEDURE — 80048 BASIC METABOLIC PNL TOTAL CA: CPT

## 2022-02-20 PROCEDURE — 83605 ASSAY OF LACTIC ACID: CPT

## 2022-02-20 PROCEDURE — 700117 HCHG RX CONTRAST REV CODE 255: Performed by: STUDENT IN AN ORGANIZED HEALTH CARE EDUCATION/TRAINING PROGRAM

## 2022-02-20 PROCEDURE — 85025 COMPLETE CBC W/AUTO DIFF WBC: CPT

## 2022-02-20 PROCEDURE — 700105 HCHG RX REV CODE 258: Performed by: STUDENT IN AN ORGANIZED HEALTH CARE EDUCATION/TRAINING PROGRAM

## 2022-02-20 RX ORDER — KETAMINE HYDROCHLORIDE 50 MG/ML
0.5 INJECTION, SOLUTION INTRAMUSCULAR; INTRAVENOUS ONCE
Status: DISCONTINUED | OUTPATIENT
Start: 2022-02-20 | End: 2022-02-20

## 2022-02-20 RX ORDER — SODIUM CHLORIDE 9 MG/ML
1000 INJECTION, SOLUTION INTRAVENOUS ONCE
Status: COMPLETED | OUTPATIENT
Start: 2022-02-20 | End: 2022-02-20

## 2022-02-20 RX ORDER — HALOPERIDOL 5 MG/ML
5 INJECTION INTRAMUSCULAR ONCE
Status: COMPLETED | OUTPATIENT
Start: 2022-02-20 | End: 2022-02-20

## 2022-02-20 RX ADMIN — IOHEXOL 75 ML: 350 INJECTION, SOLUTION INTRAVENOUS at 19:50

## 2022-02-20 RX ADMIN — HALOPERIDOL LACTATE 5 MG: 5 INJECTION, SOLUTION INTRAMUSCULAR at 22:05

## 2022-02-20 RX ADMIN — SODIUM CHLORIDE 1000 ML: 9 INJECTION, SOLUTION INTRAVENOUS at 21:15

## 2022-02-20 RX ADMIN — SODIUM CHLORIDE 1000 ML: 9 INJECTION, SOLUTION INTRAVENOUS at 19:47

## 2022-02-20 RX ADMIN — HALOPERIDOL LACTATE 5 MG: 5 INJECTION, SOLUTION INTRAMUSCULAR at 19:39

## 2022-02-20 ASSESSMENT — ENCOUNTER SYMPTOMS
NECK PAIN: 0
ABDOMINAL PAIN: 1
HEADACHES: 0
DOUBLE VISION: 0
BLURRED VISION: 0
CHILLS: 0
FALLS: 0
SHORTNESS OF BREATH: 0
LOSS OF CONSCIOUSNESS: 0
COUGH: 0
SORE THROAT: 0
VOMITING: 1
FEVER: 0
NAUSEA: 1

## 2022-02-20 ASSESSMENT — FIBROSIS 4 INDEX: FIB4 SCORE: 0.31

## 2022-02-20 NOTE — ED NOTES
Pt medicated per MAR, S/O to drive her home. Pt provided with medication education and follow up care instructions. Pt ambulated out of ER.

## 2022-02-20 NOTE — ED PROVIDER NOTES
"ED Provider Note    Scribed for Roland Green M.D. by Melodie Fitzpatrick. 2/19/2022  5:58 PM    Primary care provider: Fidencio Christopher D.O.  Means of arrival: Walk in  History obtained from: Patient  History limited by: None    CHIEF COMPLAINT  Chief Complaint   Patient presents with   • Abdominal Pain     Since midnight , lower abd pain, \"like I'm in labor.\"   • Cyst     \"I have an infected cyst between my legs.\"       HPI  Mary Martinez is a 51 y.o. female who presents to the Emergency Department for a cyst. Per the patient, she has a cyst on her upper left leg near her vagina for 1-2 weeks and suddenly developed abdominal pain today around 12:30 AM that she describes as akin to labor. She notes her  managed to drain some fluid from the cyst after attempting to pop it and that it has since appeared smaller. The patient denies fever. No exacerbating or alleviating factors were noted.     REVIEW OF SYSTEMS  Pertinent positives include: abdominal pain, cyst. Pertinent negatives include: fever. See history of present illness. All other systems are negative.     PAST MEDICAL HISTORY   has a past medical history of Arthritis, ASTHMA, Bowel habit changes, Bronchitis (last approx 2018), Chronic pain (04/24/2020), Dental disorder, Heart burn, Hiatus hernia syndrome, History of pancreatitis, Indigestion, Pain, Pneumonia (10/2019), Psychiatric problem, and Rheumatoid arthritis(714.0) (2003).    SURGICAL HISTORY   has a past surgical history that includes abdominal abscess drainage (9/27/2011); toe fusion (Right, 5/27/2015); bone spur excision (5/27/2015); hammertoe correction (5/27/2015); foot reconstruction rheumatic (Left, 7/29/2015); arthrodesis (Right, 5/6/2016); fusion, pip joint, toe (Right, 8/29/2016); bone graft (Right, 8/29/2016); irrigation & debridement ortho (Right, 9/11/2016); finger amputation (Right, 9/14/2016); finger arthroplasty (Right, 4/17/2017); finger arthroplasty (Right, 6/5/2017); finger " amputation (6/5/2017); exploratory of abdomen (N/A, 10/4/2019); tonsillectomy (1982); primary c section (1991); repeat c section (1999); repeat c section (2005); repeat c section (2008); appendectomy (2011); nicholas by laparoscopy (9/27/2011); wrist exploration (Left, 1980's); colonoscopy (2018); dental extraction(s) (2014); endoscopic us exam, esoph (4/29/2020); gastroscopy-endo (4/29/2020); and egd with asp/bx (4/29/2020).    SOCIAL HISTORY  Social History     Tobacco Use   • Smoking status: Current Every Day Smoker     Packs/day: 1.00     Years: 30.00     Pack years: 30.00     Types: Cigarettes   • Smokeless tobacco: Never Used   Vaping Use   • Vaping Use: Never used   Substance Use Topics   • Alcohol use: Never   • Drug use: Never      Social History     Substance and Sexual Activity   Drug Use Never       FAMILY HISTORY  None pertinent.     CURRENT MEDICATIONS  Current Outpatient Medications   Medication Instructions   • albuterol (VENTOLIN OR PROVENTIL) 108 (90 BASE) MCG/ACT AERS inhalation aerosol 2 Puffs, Inhalation, EVERY 4 HOURS PRN   • ENBREL 50 MG/ML Solution Prefilled Syringe INJECT 50 MG ONCE WEEKLY UNDERNEATH THE SKIN  FOR 28 DAYS   • meclizine (ANTIVERT) 25 mg, Oral, 4 TIMES DAILY   • metoclopramide (REGLAN) 10 mg, Oral, 4 TIMES DAILY, For abdominal pressure/pain   • Naloxone (NARCAN) 4 MG/0.1ML Liquid One spray in one nostril for overdose and call 911.   • ondansetron (ZOFRAN ODT) 8 mg, Oral, EVERY 8 HOURS PRN   • oxyCODONE-acetaminophen (PERCOCET-10)  MG Tab 1 Tablet, Oral, EVERY 4 HOURS PRN   • [START ON 3/4/2022] oxyCODONE-acetaminophen (PERCOCET-10)  MG Tab 1 Tablet, Oral, EVERY 4 HOURS PRN   • PARoxetine (PAXIL) 60 mg, Oral, EVERY EVENING   • QUEtiapine (SEROQUEL) 100 mg, Oral, EVERY EVENING   • QUEtiapine (SEROQUEL) 25 mg, Oral, 2 TIMES DAILY PRN     ALLERGIES  Allergies   Allergen Reactions   • Penicillins Anaphylaxis and Hives     Tolerates cephalosporins; reports throat swelling  "with PCN   • Aripiprazole Nausea     Spasms, shaking     • Nitrous Oxide Vomiting       PHYSICAL EXAM  VITAL SIGNS: /79   Pulse 86   Temp 37.2 °C (98.9 °F) (Temporal)   Resp 16   Ht 1.6 m (5' 3\")   Wt 47 kg (103 lb 9.9 oz)   LMP 09/21/2011   SpO2 96%   BMI 18.35 kg/m²     Constitutional: Alert in no apparent distress.  HENT: No signs of trauma, Bilateral external ears normal, Nose normal. Uvula midline.   Eyes: Pupils are equal and reactive, Conjunctiva normal, Non-icteric.   Neck: Normal range of motion, No tenderness, Supple, No stridor.   Lymphatic: No lymphadenopathy noted.   Cardiovascular: Regular rate and rhythm, no murmurs.   Thorax & Lungs: Normal breath sounds, No respiratory distress, No wheezing, No chest tenderness.   Abdomen:  Soft, Left lower quadrant tenderness to palpation, No peritoneal signs, No masses, No pulsatile masses.   Skin: Warm, Dry, No erythema, No rash.   Back: No bony tenderness, No CVA tenderness.   Extremities: 1.5 cm area of fluctuance on the medial aspect of the left upper thigh that does not involve the labia, no obvious drainage at this time. Intact distal pulses, No edema, No tenderness, No cyanosis.  Musculoskeletal: Good range of motion in all major joints. No tenderness to palpation or major deformities noted.   Neurologic: Alert , Normal motor function, Normal sensory function, No focal deficits noted.   Psychiatric: Affect normal, Judgment normal, Mood normal.     DIAGNOSTIC STUDIES / PROCEDURES    LABS  Labs Reviewed   CBC WITH DIFFERENTIAL - Abnormal; Notable for the following components:       Result Value    WBC 11.8 (*)     MCV 99.3 (*)     MCHC 32.3 (*)     RDW 55.8 (*)     Platelet Count 637 (*)     Neutrophils-Polys 79.20 (*)     Lymphocytes 15.50 (*)     Neutrophils (Absolute) 9.35 (*)     All other components within normal limits   COMP METABOLIC PANEL - Abnormal; Notable for the following components:    Co2 16 (*)     AST(SGOT) 10 (*)     Alkaline " Phosphatase 158 (*)     Total Protein 9.0 (*)     Globulin 4.8 (*)     All other components within normal limits   LIPASE   HCG QUAL SERUM   LACTIC ACID   ESTIMATED GFR   URINALYSIS      All labs reviewed by me.    RADIOLOGY  CT-ABDOMEN-PELVIS WITH   Final Result      1.  Slight increased density in the subcutaneous tissue of left side of the perineum/proximal medial left thigh could indicate cellulitis without evidence of abscess.      2.  Bilateral nonobstructive renal stones.        The radiologist's interpretation of all radiological studies have been reviewed by me.    COURSE & MEDICAL DECISION MAKING  Nursing notes, VS, PMSFHx reviewed in chart.    51 y.o. female p/w chief complaint of a cyst and abdominal pain.    5:58 PM Patient seen and examined at bedside. Pelvic exam chaperoned by female RN. Ordered lactic acid, beta-hcg, CBC w/diff, CMP, lipase, UA, and CT-ab pel w/ to evaluate. Patient will be treated with morphine 4 mg, Zofran 4 mg, Bactrim DS, and lidocaine.     I verified that the patient was wearing a mask and I was wearing appropriate PPE every time I entered the room. The patient's mask was on the patient at all times during my encounter except for a brief view of the oropharynx.     The differential diagnoses include but are not limited to:     #abdominal pain, cyst    CT scan of abdomen ordered in order to rule out diverticulitis given LLQ TTP. CT read as showing nonobstructive renal stones and findings suggestive of cellulitis without e/o abscess in the left side of the proximal medial left thigh.      No RLQ pain, TTP or fever to suggest appendicitis  No pain at of proportion to suggest mesenteric ischemia  No e/o rash or zoster    No e/o UTI  No elevation in lipase to suggest pancreatitis  No chest pain to suggest intrathoracic etiology of abd pain    Pt has an area of fluctuance in the medial left upper thigh with no obvious drainage.   I&D performed. Rx for Bactrim DS provided.     INCISION  AND DRAINAGE PROCEDURE NOTE:  Patient identification was confirmed and consent was obtained.  This procedure was performed by Dr. Green at 7:27 PM.  Site: medial aspect of left upper thigh  Sterile procedures observed  Anesthetic used (type and amt): 3 cc lidocaine-epinephrine 2%, lidocaine 2% jelly.   Blade size: 11  Drainage: serosanguinous fluid expressed.   Site anesthetized, incision made over site, wound drained and explored loculations, rinsed with copious amounts of normal saline, wound covered with dry, sterile dressing. Pt tolerated procedure well without complications. Instructions for care discussed verbally and pt provided with additional written instructions for homecare and f/u.    7:23 PM Incision and drainage started.     7:36 PM Incision and drainage procedure completed as outlined above. Discussed the patient's condition and treatment plan, including my plans for discharge. Patient's labs and radiology results discussed. Gave discharge instructions and return precautions.The patient understood and is comfortable with discharge. At this time, the patient was given the opportunity to ask questions.     The patient will return for new or worsening symptoms and is stable at the time of discharge.    The patient is referred to a primary physician for blood pressure management, diabetic screening, and for all other preventative health concerns.    DISPOSITION:  Patient will be discharged home in stable condition.    FOLLOW UP:  Fidencio Christopher D.O.  [address]  [facility] [address]  [facility] [id number] [phone]    In 1 week  For wound re-check    [facility], Emergency Dept  [address]  [facility] [id number] [phone]    If symptoms worsen      OUTPATIENT MEDICATIONS:  Discharge Medication List as of 2/19/2022  7:46 PM      START taking these medications    Details   sulfamethoxazole-trimethoprim (BACTRIM DS) 800-160 MG tablet Take 1 Tablet by mouth 2 times a day for 5  days., Disp-10 Tablet, R-0, Normal             FINAL IMPRESSION  1. Abscess    2. Acute abdominal pain          Melodie MOLINA (Scribe), am scribing for, and in the presence of, Roland Green M.D..    Electronically signed by: Melodie Fitzpatrick (Scribe), 2/19/2022    IRoland M.D. personally performed the services described in this documentation, as scribed by Melodie Fitzpatrick in my presence, and it is both accurate and complete.    C    The note accurately reflects work and decisions made by me.  Roland Green M.D.  2/20/2022  12:34 AM

## 2022-02-20 NOTE — ED TRIAGE NOTES
"Chief Complaint   Patient presents with   • Abdominal Pain     Since midnight , lower abd pain, \"like I'm in labor.\"   • Cyst     \"I have an infected cyst between my legs.\"     ED Triage Vitals [02/19/22 1701]   Enc Vitals Group      Blood Pressure 124/79      Pulse 86      Respiration 16      Temperature 37.2 °C (98.9 °F)      Temp src Temporal      Pulse Oximetry 96 %      Weight 47 kg (103 lb 9.9 oz)      Height 1.6 m (5' 3\")       "

## 2022-02-20 NOTE — DISCHARGE INSTRUCTIONS
Please discuss the following findings with your regular doctor:  Labs Reviewed   CBC WITH DIFFERENTIAL - Abnormal; Notable for the following components:       Result Value    WBC 11.8 (*)     MCV 99.3 (*)     MCHC 32.3 (*)     RDW 55.8 (*)     Platelet Count 637 (*)     Neutrophils-Polys 79.20 (*)     Lymphocytes 15.50 (*)     Neutrophils (Absolute) 9.35 (*)     All other components within normal limits   COMP METABOLIC PANEL - Abnormal; Notable for the following components:    Co2 16 (*)     AST(SGOT) 10 (*)     Alkaline Phosphatase 158 (*)     Total Protein 9.0 (*)     Globulin 4.8 (*)     All other components within normal limits   LIPASE   HCG QUAL SERUM   LACTIC ACID   ESTIMATED GFR   URINALYSIS      CT-ABDOMEN-PELVIS WITH   Final Result      1.  Slight increased density in the subcutaneous tissue of left side of the perineum/proximal medial left thigh could indicate cellulitis without evidence of abscess.      2.  Bilateral nonobstructive renal stones.

## 2022-02-21 ENCOUNTER — APPOINTMENT (OUTPATIENT)
Dept: RADIOLOGY | Facility: MEDICAL CENTER | Age: 52
End: 2022-02-21
Attending: STUDENT IN AN ORGANIZED HEALTH CARE EDUCATION/TRAINING PROGRAM
Payer: MEDICAID

## 2022-02-21 PROBLEM — R11.2 INTRACTABLE NAUSEA AND VOMITING: Status: ACTIVE | Noted: 2022-02-21

## 2022-02-21 PROBLEM — R10.13 EPIGASTRIC ABDOMINAL PAIN: Status: ACTIVE | Noted: 2022-02-21

## 2022-02-21 PROBLEM — N20.0 BILATERAL NEPHROLITHIASIS: Status: ACTIVE | Noted: 2022-02-21

## 2022-02-21 PROBLEM — Z71.89 ACP (ADVANCE CARE PLANNING): Status: ACTIVE | Noted: 2022-02-21

## 2022-02-21 PROBLEM — R65.10 SIRS (SYSTEMIC INFLAMMATORY RESPONSE SYNDROME) (HCC): Status: ACTIVE | Noted: 2022-02-21

## 2022-02-21 LAB
ANION GAP SERPL CALC-SCNC: 13 MMOL/L (ref 7–16)
BUN SERPL-MCNC: 12 MG/DL (ref 8–22)
CALCIUM SERPL-MCNC: 7.7 MG/DL (ref 8.5–10.5)
CHLORIDE SERPL-SCNC: 112 MMOL/L (ref 96–112)
CO2 SERPL-SCNC: 11 MMOL/L (ref 20–33)
CREAT SERPL-MCNC: 0.77 MG/DL (ref 0.5–1.4)
GLUCOSE SERPL-MCNC: 90 MG/DL (ref 65–99)
LACTATE BLD-SCNC: 0.7 MMOL/L (ref 0.5–2)
MAGNESIUM SERPL-MCNC: 1.7 MG/DL (ref 1.5–2.5)
POTASSIUM SERPL-SCNC: 3.4 MMOL/L (ref 3.6–5.5)
PROCALCITONIN SERPL-MCNC: 48.01 NG/ML
SODIUM SERPL-SCNC: 136 MMOL/L (ref 135–145)

## 2022-02-21 PROCEDURE — C9113 INJ PANTOPRAZOLE SODIUM, VIA: HCPCS | Performed by: INTERNAL MEDICINE

## 2022-02-21 PROCEDURE — 700105 HCHG RX REV CODE 258: Performed by: STUDENT IN AN ORGANIZED HEALTH CARE EDUCATION/TRAINING PROGRAM

## 2022-02-21 PROCEDURE — 700111 HCHG RX REV CODE 636 W/ 250 OVERRIDE (IP): Performed by: NURSE PRACTITIONER

## 2022-02-21 PROCEDURE — 96375 TX/PRO/DX INJ NEW DRUG ADDON: CPT

## 2022-02-21 PROCEDURE — 36415 COLL VENOUS BLD VENIPUNCTURE: CPT

## 2022-02-21 PROCEDURE — 99225 PR SUBSEQUENT OBSERVATION CARE,LEVEL II: CPT | Performed by: INTERNAL MEDICINE

## 2022-02-21 PROCEDURE — 96365 THER/PROPH/DIAG IV INF INIT: CPT

## 2022-02-21 PROCEDURE — 71045 X-RAY EXAM CHEST 1 VIEW: CPT

## 2022-02-21 PROCEDURE — 700111 HCHG RX REV CODE 636 W/ 250 OVERRIDE (IP): Performed by: STUDENT IN AN ORGANIZED HEALTH CARE EDUCATION/TRAINING PROGRAM

## 2022-02-21 PROCEDURE — A9270 NON-COVERED ITEM OR SERVICE: HCPCS | Performed by: STUDENT IN AN ORGANIZED HEALTH CARE EDUCATION/TRAINING PROGRAM

## 2022-02-21 PROCEDURE — G0378 HOSPITAL OBSERVATION PER HR: HCPCS

## 2022-02-21 PROCEDURE — 96366 THER/PROPH/DIAG IV INF ADDON: CPT

## 2022-02-21 PROCEDURE — 96376 TX/PRO/DX INJ SAME DRUG ADON: CPT

## 2022-02-21 PROCEDURE — 99497 ADVNCD CARE PLAN 30 MIN: CPT | Performed by: STUDENT IN AN ORGANIZED HEALTH CARE EDUCATION/TRAINING PROGRAM

## 2022-02-21 PROCEDURE — 700102 HCHG RX REV CODE 250 W/ 637 OVERRIDE(OP): Performed by: INTERNAL MEDICINE

## 2022-02-21 PROCEDURE — 87040 BLOOD CULTURE FOR BACTERIA: CPT | Mod: 91

## 2022-02-21 PROCEDURE — 83735 ASSAY OF MAGNESIUM: CPT

## 2022-02-21 PROCEDURE — 700101 HCHG RX REV CODE 250: Performed by: INTERNAL MEDICINE

## 2022-02-21 PROCEDURE — 96368 THER/DIAG CONCURRENT INF: CPT

## 2022-02-21 PROCEDURE — 80048 BASIC METABOLIC PNL TOTAL CA: CPT

## 2022-02-21 PROCEDURE — 84145 PROCALCITONIN (PCT): CPT

## 2022-02-21 PROCEDURE — 96367 TX/PROPH/DG ADDL SEQ IV INF: CPT

## 2022-02-21 PROCEDURE — 83605 ASSAY OF LACTIC ACID: CPT

## 2022-02-21 PROCEDURE — A9270 NON-COVERED ITEM OR SERVICE: HCPCS | Performed by: INTERNAL MEDICINE

## 2022-02-21 PROCEDURE — 700102 HCHG RX REV CODE 250 W/ 637 OVERRIDE(OP): Performed by: STUDENT IN AN ORGANIZED HEALTH CARE EDUCATION/TRAINING PROGRAM

## 2022-02-21 PROCEDURE — 99220 PR INITIAL OBSERVATION CARE,LEVL III: CPT | Mod: 25 | Performed by: STUDENT IN AN ORGANIZED HEALTH CARE EDUCATION/TRAINING PROGRAM

## 2022-02-21 PROCEDURE — 700111 HCHG RX REV CODE 636 W/ 250 OVERRIDE (IP): Performed by: INTERNAL MEDICINE

## 2022-02-21 PROCEDURE — 700101 HCHG RX REV CODE 250: Performed by: STUDENT IN AN ORGANIZED HEALTH CARE EDUCATION/TRAINING PROGRAM

## 2022-02-21 PROCEDURE — 96372 THER/PROPH/DIAG INJ SC/IM: CPT

## 2022-02-21 RX ORDER — KETOROLAC TROMETHAMINE 30 MG/ML
15 INJECTION, SOLUTION INTRAMUSCULAR; INTRAVENOUS EVERY 6 HOURS PRN
Status: DISCONTINUED | OUTPATIENT
Start: 2022-02-21 | End: 2022-02-22 | Stop reason: HOSPADM

## 2022-02-21 RX ORDER — SODIUM CHLORIDE 9 MG/ML
1000 INJECTION, SOLUTION INTRAVENOUS ONCE
Status: COMPLETED | OUTPATIENT
Start: 2022-02-21 | End: 2022-02-21

## 2022-02-21 RX ORDER — PROMETHAZINE HYDROCHLORIDE 25 MG/1
12.5-25 TABLET ORAL EVERY 4 HOURS PRN
Status: DISCONTINUED | OUTPATIENT
Start: 2022-02-21 | End: 2022-02-22 | Stop reason: HOSPADM

## 2022-02-21 RX ORDER — MORPHINE SULFATE 4 MG/ML
2 INJECTION INTRAVENOUS
Status: DISCONTINUED | OUTPATIENT
Start: 2022-02-21 | End: 2022-02-22 | Stop reason: HOSPADM

## 2022-02-21 RX ORDER — PANTOPRAZOLE SODIUM 40 MG/10ML
40 INJECTION, POWDER, LYOPHILIZED, FOR SOLUTION INTRAVENOUS ONCE
Status: COMPLETED | OUTPATIENT
Start: 2022-02-21 | End: 2022-02-21

## 2022-02-21 RX ORDER — ONDANSETRON 2 MG/ML
4 INJECTION INTRAMUSCULAR; INTRAVENOUS EVERY 4 HOURS PRN
Status: DISCONTINUED | OUTPATIENT
Start: 2022-02-21 | End: 2022-02-22 | Stop reason: HOSPADM

## 2022-02-21 RX ORDER — POTASSIUM CHLORIDE 7.45 MG/ML
10 INJECTION INTRAVENOUS ONCE
Status: COMPLETED | OUTPATIENT
Start: 2022-02-21 | End: 2022-02-21

## 2022-02-21 RX ORDER — ONDANSETRON 4 MG/1
4 TABLET, ORALLY DISINTEGRATING ORAL EVERY 4 HOURS PRN
Status: DISCONTINUED | OUTPATIENT
Start: 2022-02-21 | End: 2022-02-22 | Stop reason: HOSPADM

## 2022-02-21 RX ORDER — SODIUM BICARBONATE 650 MG/1
650 TABLET ORAL 3 TIMES DAILY
Status: DISCONTINUED | OUTPATIENT
Start: 2022-02-21 | End: 2022-02-22 | Stop reason: HOSPADM

## 2022-02-21 RX ORDER — PROMETHAZINE HYDROCHLORIDE 25 MG/1
12.5-25 SUPPOSITORY RECTAL EVERY 4 HOURS PRN
Status: DISCONTINUED | OUTPATIENT
Start: 2022-02-21 | End: 2022-02-22 | Stop reason: HOSPADM

## 2022-02-21 RX ORDER — OMEPRAZOLE 20 MG/1
20 CAPSULE, DELAYED RELEASE ORAL 2 TIMES DAILY
Status: DISCONTINUED | OUTPATIENT
Start: 2022-02-21 | End: 2022-02-22 | Stop reason: HOSPADM

## 2022-02-21 RX ORDER — PROCHLORPERAZINE EDISYLATE 5 MG/ML
5-10 INJECTION INTRAMUSCULAR; INTRAVENOUS EVERY 4 HOURS PRN
Status: DISCONTINUED | OUTPATIENT
Start: 2022-02-21 | End: 2022-02-22 | Stop reason: HOSPADM

## 2022-02-21 RX ORDER — POTASSIUM CHLORIDE 20 MEQ/1
40 TABLET, EXTENDED RELEASE ORAL EVERY 4 HOURS
Status: DISCONTINUED | OUTPATIENT
Start: 2022-02-21 | End: 2022-02-21

## 2022-02-21 RX ORDER — SODIUM CHLORIDE AND POTASSIUM CHLORIDE 300; 900 MG/100ML; MG/100ML
INJECTION, SOLUTION INTRAVENOUS CONTINUOUS
Status: DISCONTINUED | OUTPATIENT
Start: 2022-02-21 | End: 2022-02-22

## 2022-02-21 RX ORDER — NICOTINE 21 MG/24HR
21 PATCH, TRANSDERMAL 24 HOURS TRANSDERMAL
Status: DISCONTINUED | OUTPATIENT
Start: 2022-02-21 | End: 2022-02-22 | Stop reason: HOSPADM

## 2022-02-21 RX ORDER — SODIUM CHLORIDE 9 MG/ML
INJECTION, SOLUTION INTRAVENOUS CONTINUOUS
Status: DISCONTINUED | OUTPATIENT
Start: 2022-02-21 | End: 2022-02-21

## 2022-02-21 RX ORDER — MORPHINE SULFATE 4 MG/ML
1 INJECTION INTRAVENOUS EVERY 4 HOURS PRN
Status: COMPLETED | OUTPATIENT
Start: 2022-02-21 | End: 2022-02-21

## 2022-02-21 RX ORDER — QUETIAPINE FUMARATE 100 MG/1
100 TABLET, FILM COATED ORAL EVERY EVENING
Status: DISCONTINUED | OUTPATIENT
Start: 2022-02-21 | End: 2022-02-22 | Stop reason: HOSPADM

## 2022-02-21 RX ORDER — ACETAMINOPHEN 325 MG/1
650 TABLET ORAL EVERY 6 HOURS PRN
Status: DISCONTINUED | OUTPATIENT
Start: 2022-02-21 | End: 2022-02-22 | Stop reason: HOSPADM

## 2022-02-21 RX ORDER — SODIUM BICARBONATE 650 MG/1
650 TABLET ORAL 3 TIMES DAILY PRN
Status: DISCONTINUED | OUTPATIENT
Start: 2022-02-21 | End: 2022-02-21

## 2022-02-21 RX ORDER — HYDROMORPHONE HYDROCHLORIDE 1 MG/ML
1 INJECTION, SOLUTION INTRAMUSCULAR; INTRAVENOUS; SUBCUTANEOUS ONCE
Status: COMPLETED | OUTPATIENT
Start: 2022-02-21 | End: 2022-02-21

## 2022-02-21 RX ADMIN — MORPHINE SULFATE 1 MG: 4 INJECTION INTRAVENOUS at 10:50

## 2022-02-21 RX ADMIN — POTASSIUM CHLORIDE 10 MEQ: 7.46 INJECTION, SOLUTION INTRAVENOUS at 02:22

## 2022-02-21 RX ADMIN — METRONIDAZOLE 500 MG: 500 INJECTION, SOLUTION INTRAVENOUS at 13:19

## 2022-02-21 RX ADMIN — MORPHINE SULFATE 2 MG: 4 INJECTION INTRAVENOUS at 22:07

## 2022-02-21 RX ADMIN — HYDROMORPHONE HYDROCHLORIDE 1 MG: 1 INJECTION, SOLUTION INTRAMUSCULAR; INTRAVENOUS; SUBCUTANEOUS at 00:14

## 2022-02-21 RX ADMIN — SODIUM BICARBONATE 650 MG: 650 TABLET ORAL at 18:08

## 2022-02-21 RX ADMIN — CEFEPIME 2 G: 2 INJECTION, POWDER, FOR SOLUTION INTRAVENOUS at 18:08

## 2022-02-21 RX ADMIN — SODIUM BICARBONATE 650 MG: 650 TABLET ORAL at 11:45

## 2022-02-21 RX ADMIN — SODIUM CHLORIDE 1000 ML: 9 INJECTION, SOLUTION INTRAVENOUS at 00:14

## 2022-02-21 RX ADMIN — ONDANSETRON 4 MG: 2 INJECTION INTRAMUSCULAR; INTRAVENOUS at 06:45

## 2022-02-21 RX ADMIN — NICOTINE TRANSDERMAL SYSTEM 21 MG: 21 PATCH, EXTENDED RELEASE TRANSDERMAL at 05:36

## 2022-02-21 RX ADMIN — QUETIAPINE FUMARATE 100 MG: 100 TABLET ORAL at 18:08

## 2022-02-21 RX ADMIN — SODIUM CHLORIDE: 9 INJECTION, SOLUTION INTRAVENOUS at 02:14

## 2022-02-21 RX ADMIN — MORPHINE SULFATE 1 MG: 4 INJECTION INTRAVENOUS at 03:03

## 2022-02-21 RX ADMIN — POTASSIUM CHLORIDE AND SODIUM CHLORIDE: 900; 300 INJECTION, SOLUTION INTRAVENOUS at 11:45

## 2022-02-21 RX ADMIN — MORPHINE SULFATE 1 MG: 4 INJECTION INTRAVENOUS at 06:45

## 2022-02-21 RX ADMIN — PANTOPRAZOLE SODIUM 40 MG: 40 INJECTION, POWDER, LYOPHILIZED, FOR SOLUTION INTRAVENOUS at 11:45

## 2022-02-21 RX ADMIN — ENOXAPARIN SODIUM 40 MG: 40 INJECTION SUBCUTANEOUS at 05:36

## 2022-02-21 RX ADMIN — METRONIDAZOLE 500 MG: 500 INJECTION, SOLUTION INTRAVENOUS at 22:07

## 2022-02-21 RX ADMIN — CEFEPIME 2 G: 2 INJECTION, POWDER, FOR SOLUTION INTRAVENOUS at 06:27

## 2022-02-21 RX ADMIN — SODIUM BICARBONATE 650 MG: 650 TABLET ORAL at 06:27

## 2022-02-21 RX ADMIN — METRONIDAZOLE 500 MG: 500 INJECTION, SOLUTION INTRAVENOUS at 06:44

## 2022-02-21 RX ADMIN — POTASSIUM CHLORIDE 40 MEQ: 1500 TABLET, EXTENDED RELEASE ORAL at 05:41

## 2022-02-21 RX ADMIN — ONDANSETRON 4 MG: 2 INJECTION INTRAMUSCULAR; INTRAVENOUS at 03:04

## 2022-02-21 RX ADMIN — OMEPRAZOLE 20 MG: 20 CAPSULE, DELAYED RELEASE ORAL at 18:08

## 2022-02-21 RX ADMIN — MORPHINE SULFATE 2 MG: 4 INJECTION INTRAVENOUS at 16:12

## 2022-02-21 RX ADMIN — ONDANSETRON 4 MG: 2 INJECTION INTRAMUSCULAR; INTRAVENOUS at 10:50

## 2022-02-21 ASSESSMENT — PATIENT HEALTH QUESTIONNAIRE - PHQ9
7. TROUBLE CONCENTRATING ON THINGS, SUCH AS READING THE NEWSPAPER OR WATCHING TELEVISION: SEVERAL DAYS
3. TROUBLE FALLING OR STAYING ASLEEP OR SLEEPING TOO MUCH: NEARLY EVERY DAY
6. FEELING BAD ABOUT YOURSELF - OR THAT YOU ARE A FAILURE OR HAVE LET YOURSELF OR YOUR FAMILY DOWN: MORE THAN HALF THE DAYS
SUM OF ALL RESPONSES TO PHQ QUESTIONS 1-9: 16
8. MOVING OR SPEAKING SO SLOWLY THAT OTHER PEOPLE COULD HAVE NOTICED. OR THE OPPOSITE, BEING SO FIGETY OR RESTLESS THAT YOU HAVE BEEN MOVING AROUND A LOT MORE THAN USUAL: SEVERAL DAYS
2. FEELING DOWN, DEPRESSED, IRRITABLE, OR HOPELESS: NEARLY EVERY DAY
SUM OF ALL RESPONSES TO PHQ9 QUESTIONS 1 AND 2: 4
4. FEELING TIRED OR HAVING LITTLE ENERGY: NEARLY EVERY DAY
9. THOUGHTS THAT YOU WOULD BE BETTER OFF DEAD, OR OF HURTING YOURSELF: SEVERAL DAYS
1. LITTLE INTEREST OR PLEASURE IN DOING THINGS: SEVERAL DAYS
5. POOR APPETITE OR OVEREATING: SEVERAL DAYS

## 2022-02-21 ASSESSMENT — ENCOUNTER SYMPTOMS
NECK PAIN: 0
VOMITING: 1
PALPITATIONS: 0
TINGLING: 0
MYALGIAS: 0
BLOOD IN STOOL: 0
HEADACHES: 0
DEPRESSION: 0
DOUBLE VISION: 0
SPUTUM PRODUCTION: 0
FOCAL WEAKNESS: 0
COUGH: 0
DIAPHORESIS: 0
BRUISES/BLEEDS EASILY: 0
DIZZINESS: 0
SORE THROAT: 0
WHEEZING: 0
DIARRHEA: 0
BACK PAIN: 0
HEARTBURN: 0
FEVER: 0
HEMOPTYSIS: 0
NAUSEA: 1
FLANK PAIN: 0
CHILLS: 0
SHORTNESS OF BREATH: 0
SEIZURES: 0
BLURRED VISION: 0
ABDOMINAL PAIN: 1

## 2022-02-21 ASSESSMENT — LIFESTYLE VARIABLES
TOTAL SCORE: 0
ON A TYPICAL DAY WHEN YOU DRINK ALCOHOL HOW MANY DRINKS DO YOU HAVE: 0
HAVE PEOPLE ANNOYED YOU BY CRITICIZING YOUR DRINKING: NO
CONSUMPTION TOTAL: NEGATIVE
ALCOHOL_USE: NO
HOW MANY TIMES IN THE PAST YEAR HAVE YOU HAD 5 OR MORE DRINKS IN A DAY: 0
EVER HAD A DRINK FIRST THING IN THE MORNING TO STEADY YOUR NERVES TO GET RID OF A HANGOVER: NO
TOTAL SCORE: 0
TOTAL SCORE: 0
DOES PATIENT WANT TO STOP DRINKING: NO
HAVE YOU EVER FELT YOU SHOULD CUT DOWN ON YOUR DRINKING: NO
AVERAGE NUMBER OF DAYS PER WEEK YOU HAVE A DRINK CONTAINING ALCOHOL: 0
EVER FELT BAD OR GUILTY ABOUT YOUR DRINKING: NO

## 2022-02-21 ASSESSMENT — FIBROSIS 4 INDEX
FIB4 SCORE: 0.39
FIB4 SCORE: 0.39

## 2022-02-21 ASSESSMENT — PAIN DESCRIPTION - PAIN TYPE: TYPE: ACUTE PAIN

## 2022-02-21 NOTE — ED TRIAGE NOTES
"Chief Complaint   Patient presents with   • Abdominal Pain     Pt reports abdominal pain \"across whole front for a while\".     • N/V     X1 day     Pt to triage from lobby with above complaint.  Pt see at  ER yesterday for similar complaint.      /71   Pulse 97   Temp 36.3 °C (97.4 °F) (Temporal)   Resp 16   Ht 1.6 m (5' 3\")   Wt 46 kg (101 lb 6.6 oz)   LMP 09/21/2011   SpO2 98%   BMI 17.96 kg/m²     "

## 2022-02-21 NOTE — ED PROVIDER NOTES
ED Provider Note    Chief Complaint:   Abdominal pain    HPI:  Mary Martinez is a very pleasant 51-year-old female who presents with worsening periumbilical abdominal pain that radiates to the back, is severe, stabbing and associated with nausea and nonbloody emesis.  Patient presented yesterday with similar complaints and CT imaging and labs were unremarkable.  Patient reports previous history of negative CTs but ultimately found to have perforated stomach and other acute intra-abdominal processes.  Patient denies fevers.    Review of Systems:  Review of Systems   Constitutional: Negative for chills and fever.   HENT: Negative for congestion and sore throat.    Eyes: Negative for blurred vision and double vision.   Respiratory: Negative for cough and shortness of breath.    Cardiovascular: Negative for chest pain and leg swelling.   Gastrointestinal: Positive for abdominal pain, nausea and vomiting.   Genitourinary: Negative for dysuria and hematuria.   Musculoskeletal: Negative for falls and neck pain.   Skin: Negative for itching and rash.   Neurological: Negative for loss of consciousness and headaches.       Past Medical History:   has a past medical history of Arthritis, ASTHMA, Bowel habit changes, Bronchitis (last approx 2018), Chronic pain (04/24/2020), Dental disorder, Heart burn, Hiatus hernia syndrome, History of pancreatitis, Indigestion, Pain, Pneumonia (10/2019), Psychiatric problem, and Rheumatoid arthritis(714.0) (2003).    Social History:  Social History     Tobacco Use   • Smoking status: Current Every Day Smoker     Packs/day: 1.00     Years: 30.00     Pack years: 30.00     Types: Cigarettes   • Smokeless tobacco: Never Used   Vaping Use   • Vaping Use: Never used   Substance and Sexual Activity   • Alcohol use: Never   • Drug use: Never   • Sexual activity: Not on file       Surgical History:   has a past surgical history that includes abdominal abscess drainage (9/27/2011); toe fusion  "(Right, 5/27/2015); bone spur excision (5/27/2015); hammertoe correction (5/27/2015); foot reconstruction rheumatic (Left, 7/29/2015); arthrodesis (Right, 5/6/2016); fusion, pip joint, toe (Right, 8/29/2016); bone graft (Right, 8/29/2016); irrigation & debridement ortho (Right, 9/11/2016); finger amputation (Right, 9/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    Allergies:  Allergies   Allergen Reactions   • Penicillins Anaphylaxis and Hives     Tolerates cephalosporins; reports throat swelling with PCN   • Aripiprazole Nausea     Spasms, shaking     • Nitrous Oxide Vomiting       Physical Exam:  Vital Signs: /78   Pulse 78   Temp 36.3 °C (97.4 °F) (Temporal)   Resp 18   Ht 1.6 m (5' 3\")   Wt 46 kg (101 lb 6.6 oz)   LMP 09/21/2011   SpO2 98%   BMI 17.96 kg/m²   Physical Exam  Vitals and nursing note reviewed.   Constitutional:       Comments: Patient is lying in bed supine, very uncomfortable appearing, pleasant, conversant   HENT:      Head: Normocephalic and atraumatic.   Eyes:      Extraocular Movements: Extraocular movements intact.      Conjunctiva/sclera: Conjunctivae normal.      Pupils: Pupils are equal, round, and reactive to light.   Cardiovascular:      Pulses: Normal pulses.      Comments: HR 84  Pulmonary:      Effort: Pulmonary effort is normal. No respiratory distress.   Abdominal:      Comments: Abdomen is rigid, severe tenderness palpation in all quadrants especially the periumbilical area, voluntary guarding is " present   Musculoskeletal:         General: No swelling. Normal range of motion.      Cervical back: Normal range of motion. No rigidity.   Skin:     General: Skin is warm and dry.      Capillary Refill: Capillary refill takes less than 2 seconds.   Neurological:      Mental Status: She is alert.         Medical records reviewed for continuity of care.     Results for orders placed or performed during the hospital encounter of 02/20/22   CBC WITH DIFFERENTIAL   Result Value Ref Range    WBC 18.3 (H) 4.8 - 10.8 K/uL    RBC 4.20 4.20 - 5.40 M/uL    Hemoglobin 13.5 12.0 - 16.0 g/dL    Hematocrit 40.6 37.0 - 47.0 %    MCV 96.7 81.4 - 97.8 fL    MCH 32.1 27.0 - 33.0 pg    MCHC 33.3 (L) 33.6 - 35.0 g/dL    RDW 54.8 (H) 35.9 - 50.0 fL    Platelet Count 666 (H) 164 - 446 K/uL    MPV 9.7 9.0 - 12.9 fL    Neutrophils-Polys 82.90 (H) 44.00 - 72.00 %    Lymphocytes 11.00 (L) 22.00 - 41.00 %    Monocytes 4.90 0.00 - 13.40 %    Eosinophils 0.20 0.00 - 6.90 %    Basophils 0.50 0.00 - 1.80 %    Immature Granulocytes 0.50 0.00 - 0.90 %    Nucleated RBC 0.00 /100 WBC    Neutrophils (Absolute) 15.16 (H) 2.00 - 7.15 K/uL    Lymphs (Absolute) 2.01 1.00 - 4.80 K/uL    Monos (Absolute) 0.89 (H) 0.00 - 0.85 K/uL    Eos (Absolute) 0.04 0.00 - 0.51 K/uL    Baso (Absolute) 0.09 0.00 - 0.12 K/uL    Immature Granulocytes (abs) 0.10 0.00 - 0.11 K/uL    NRBC (Absolute) 0.00 K/uL   COMP METABOLIC PANEL   Result Value Ref Range    Sodium 132 (L) 135 - 145 mmol/L    Potassium 3.8 3.6 - 5.5 mmol/L    Chloride 104 96 - 112 mmol/L    Co2 11 (L) 20 - 33 mmol/L    Anion Gap 17.0 (H) 7.0 - 16.0    Glucose 116 (H) 65 - 99 mg/dL    Bun 17 8 - 22 mg/dL    Creatinine 1.12 0.50 - 1.40 mg/dL    Calcium 9.4 8.5 - 10.5 mg/dL    AST(SGOT) 12 12 - 45 U/L    ALT(SGPT) 5 2 - 50 U/L    Alkaline Phosphatase 143 (H) 30 - 99 U/L    Total Bilirubin 0.2 0.1 - 1.5 mg/dL    Albumin 4.0 3.2 - 4.9 g/dL    Total Protein 8.4 (H) 6.0 - 8.2 g/dL    Globulin 4.4 (H) 1.9 - 3.5 g/dL     A-G Ratio 0.9 g/dL   LIPASE   Result Value Ref Range    Lipase 24 11 - 82 U/L   URINALYSIS    Specimen: Urine   Result Value Ref Range    Color Yellow     Character Clear     Specific Gravity 1.042 <1.035    Ph 5.5 5.0 - 8.0    Glucose Negative Negative mg/dL    Ketones Negative Negative mg/dL    Protein 30 (A) Negative mg/dL    Bilirubin Negative Negative    Urobilinogen, Urine 0.2 Negative    Nitrite Negative Negative    Leukocyte Esterase Negative Negative    Occult Blood Negative Negative    Micro Urine Req Microscopic    URINE MICROSCOPIC (W/UA)   Result Value Ref Range    WBC 0-2 /hpf    RBC 2-5 (A) /hpf    Bacteria Negative None /hpf    Epithelial Cells Few /hpf    Hyaline Cast 0-2 /lpf   ESTIMATED GFR   Result Value Ref Range    GFR If African American >60 >60 mL/min/1.73 m 2    GFR If Non African American 52 (A) >60 mL/min/1.73 m 2   LACTIC ACID   Result Value Ref Range    Lactic Acid 1.1 0.5 - 2.0 mmol/L   Basic Metabolic Panel   Result Value Ref Range    Sodium 136 135 - 145 mmol/L    Potassium 3.5 (L) 3.6 - 5.5 mmol/L    Chloride 110 96 - 112 mmol/L    Co2 11 (L) 20 - 33 mmol/L    Glucose 112 (H) 65 - 99 mg/dL    Bun 14 8 - 22 mg/dL    Creatinine 0.80 0.50 - 1.40 mg/dL    Calcium 7.4 (L) 8.5 - 10.5 mg/dL    Anion Gap 15.0 7.0 - 16.0   ESTIMATED GFR   Result Value Ref Range    GFR If African American >60 >60 mL/min/1.73 m 2    GFR If Non African American >60 >60 mL/min/1.73 m 2       Radiology:  CT-ABDOMEN-PELVIS WITH   Final Result      1.  Inflammation in the medial left thigh/perineum, similar to the prior exam. No drainable fluid collection is identified      2.  Stool volume appears increased.      3.  Bilateral nephrolithiasis           MDM:  Patient with leukocytosis however urinalysis is negative, no obvious source of infection.  Patient has severe intra-abdominal pain and despite CT imaging since the patient's white blood cell count has continued to increase and pain has worsened I do believe  that repeat CT imaging is indicated at this time.  Patient is also acutely acidotic with significant anion gap.  Disposition pending work-up.  Haldol for nausea and pain control.    Electronically signed by: Maikel Tran M.D., 2/20/2022, 7:24 PM    Patient with persistent pain despite multiple doses of pain medications.  CT abdomen pelvis demonstrates no acute intra-abdominal process.  After 2 L IV fluids patient is still persistently complaining of pain in bicarb is still persistently low at 11.  Patient appears very dehydrated, sunken periorbital area, continued pain, patient consulted to Dr. Mayers of [facility] medicine for admission for IV fluids, pain control and repeat lab work.    This dictation has been created using voice recognition software. I am continuously working with the software to minimize the number of voice recognition errors and I have made every attempt to manually correct the errors within my dictation. However errors  related to this voice recognition software may still exist and should be interpreted within the appropriate context.     Electronically signed by: Maikel Tran M.D., 2/21/2022 12:12 AM      Disposition:  Hospital medicine    Final Impression:  1. Intractable pain    2. Dehydration

## 2022-02-21 NOTE — PROGRESS NOTES
Bedside report received from NOC RN   Pt awake and alert, no c/o pain or nausea at this time  Pt denies SI/HI at this time   Assessment completed   Safety information reviewed with pt  Plan of care for day reviewed with pt and pt agrees   All other pt needs addressed at this time   Hourly rounding in place   Bed locked and in lowest position, call light within reach

## 2022-02-21 NOTE — H&P
"Hospital Medicine History & Physical Note    Date of Service  2/21/2022    Primary Care Physician  Fidencio Christopher D.O.    Consultants  none    Specialist Names: none     Code Status  DNAR/DNI    Chief Complaint  Chief Complaint   Patient presents with   • Abdominal Pain     Pt reports abdominal pain \"across whole front for a while\".     • N/V     X1 day       History of Presenting Illness  Mary Martinez is a 51 y.o. female past medical history of rheumatoid arthritis, psychiatric who presented 2/20/2022 with nausea and vomiting that started on Friday around 12:30 PM.  Patient reports additional anterior abdominal pain, 7 out of 10 intensity, not relieved or exacerbated with anything but does radiate to the back.  She denies any fever, chills or shortness of breath.  In the ER, CT abdomen/pelvis done showed no acute abdominal findings and CT has been unchanged since prior.  Labs are significant for bicarb of 11, anion gap of 17 and lactate 1.1.  Vital signs include blood pressure 121/78, respiratory rate 18, heart rate of 78, temperature 97.4 °F.    I discussed the plan of care with patient.    Review of Systems  Review of Systems   Constitutional: Negative for chills and fever.   HENT: Negative for hearing loss and tinnitus.    Eyes: Negative for blurred vision and double vision.   Respiratory: Negative for cough and hemoptysis.    Cardiovascular: Negative for chest pain and palpitations.   Gastrointestinal: Positive for abdominal pain, nausea and vomiting. Negative for heartburn.   Genitourinary: Negative for dysuria and urgency.   Musculoskeletal: Negative for myalgias and neck pain.   Skin: Negative for itching and rash.   Neurological: Negative for dizziness, tingling and headaches.   Endo/Heme/Allergies: Does not bruise/bleed easily.   Psychiatric/Behavioral: Negative for depression and suicidal ideas.       Past Medical History   has a past medical history of Arthritis, ASTHMA, Bowel habit changes, " Bronchitis (last approx 2018), Chronic pain (04/24/2020), Dental disorder, Heart burn, Hiatus hernia syndrome, History of pancreatitis, Indigestion, Pain, Pneumonia (10/2019), Psychiatric problem, and Rheumatoid arthritis(714.0) (2003).    Surgical History   has a past surgical history that includes abdominal abscess drainage (9/27/2011); toe fusion (Right, 5/27/2015); bone spur excision (5/27/2015); hammertoe correction (5/27/2015); foot reconstruction rheumatic (Left, 7/29/2015); arthrodesis (Right, 5/6/2016); fusion, pip joint, toe (Right, 8/29/2016); bone graft (Right, 8/29/2016); irrigation & debridement ortho (Right, 9/11/2016); finger amputation (Right, 9/14/2016); finger arthroplasty (Right, 4/17/2017); finger arthroplasty (Right, 6/5/2017); finger amputation (6/5/2017); pr exploratory of abdomen (N/A, 10/4/2019); tonsillectomy (1982); primary c section (1991); repeat c section (1999); repeat c section (2005); repeat c section (2008); appendectomy (2011); nicholas by laparoscopy (9/27/2011); wrist exploration (Left, 1980's); colonoscopy (2018); dental extraction(s) (2014); pr endoscopic us exam, esoph (4/29/2020); gastroscopy-endo (4/29/2020); and egd with asp/bx (4/29/2020).     Family History  Family history reviewed with patient. There is no family history that is pertinent to the chief complaint.     Social History   reports that she has been smoking cigarettes. She has a 30.00 pack-year smoking history. She has never used smokeless tobacco. She reports that she does not drink alcohol and does not use drugs.    Allergies  Allergies   Allergen Reactions   • Penicillins Anaphylaxis and Hives     Tolerates cephalosporins; reports throat swelling with PCN   • Aripiprazole Nausea     Spasms, shaking     • Nitrous Oxide Vomiting       Medications  Prior to Admission Medications   Prescriptions Last Dose Informant Patient Reported? Taking?   ENBREL 50 MG/ML Solution Prefilled Syringe  Patient Yes No   Sig: INJECT  50 MG ONCE WEEKLY UNDERNEATH THE SKIN  FOR 28 DAYS   Naloxone (NARCAN) 4 MG/0.1ML Liquid  Patient No No   Sig: One spray in one nostril for overdose and call 911.   PARoxetine (PAXIL) 30 MG Tab  Patient Yes No   Sig: Take 60 mg by mouth every evening.   QUEtiapine (SEROQUEL) 100 MG Tab  Patient Yes No   Sig: Take 100 mg by mouth every evening.   QUEtiapine (SEROQUEL) 25 MG Tab  Patient Yes No   Sig: Take 25 mg by mouth 2 times a day as needed for Anxiety.   albuterol (VENTOLIN OR PROVENTIL) 108 (90 BASE) MCG/ACT AERS inhalation aerosol  Patient Yes No   Sig: Inhale 2 Puffs by mouth every four hours as needed for Shortness of Breath.   meclizine (ANTIVERT) 25 MG Tab   No No   Sig: Take 1 tablet by mouth 4 times a day.   Patient not taking: Reported on 1/4/2022   metoclopramide (REGLAN) 10 MG Tab   No No   Sig: Take 1 tablet by mouth 4 times a day. For abdominal pressure/pain   Patient not taking: Reported on 1/4/2022   ondansetron (ZOFRAN ODT) 8 MG TABLET DISPERSIBLE  Patient Yes No   Sig: Take 8 mg by mouth every 8 hours as needed.   oxyCODONE-acetaminophen (PERCOCET-10)  MG Tab   No No   Sig: Take 1 Tablet by mouth every four hours as needed for Severe Pain for up to 30 days.   oxyCODONE-acetaminophen (PERCOCET-10)  MG Tab   No No   Sig: Take 1 Tablet by mouth every four hours as needed for Severe Pain for up to 30 days.   sulfamethoxazole-trimethoprim (BACTRIM DS) 800-160 MG tablet   No No   Sig: Take 1 Tablet by mouth 2 times a day for 5 days.      Facility-Administered Medications: None       Physical Exam  Temp:  [36.3 °C (97.4 °F)] 36.3 °C (97.4 °F)  Pulse:  [77-97] 78  Resp:  [16-20] 18  BP: (112-136)/(70-81) 122/78  SpO2:  [96 %-98 %] 98 %  Blood Pressure: 122/78   Temperature: 36.3 °C (97.4 °F)   Pulse: 78   Respiration: 18   Pulse Oximetry: 98 %       Physical Exam  Vitals and nursing note reviewed.   Constitutional:       General: She is not in acute distress.     Appearance: Normal  appearance. She is not ill-appearing.   HENT:      Head: Normocephalic and atraumatic.      Right Ear: Tympanic membrane normal.      Left Ear: Tympanic membrane normal.      Nose: Nose normal.      Mouth/Throat:      Mouth: Mucous membranes are dry.      Pharynx: Oropharynx is clear.   Eyes:      General:         Right eye: No discharge.         Left eye: No discharge.      Extraocular Movements: Extraocular movements intact.      Pupils: Pupils are equal, round, and reactive to light.   Cardiovascular:      Rate and Rhythm: Normal rate and regular rhythm.      Pulses: Normal pulses.      Heart sounds: Normal heart sounds. No murmur heard.    No friction rub.   Pulmonary:      Effort: Pulmonary effort is normal. No respiratory distress.      Breath sounds: Normal breath sounds. No stridor. No wheezing or rhonchi.   Abdominal:      General: Bowel sounds are normal. There is no distension.      Palpations: Abdomen is soft. There is no mass.      Tenderness: There is abdominal tenderness. There is guarding (voluntary ).      Hernia: No hernia is present.      Comments: Midline scar noted    Musculoskeletal:         General: Deformity (right hand ) present. Normal range of motion.      Cervical back: Neck supple.      Comments: Bilateral hands rhematoid changes    Skin:     General: Skin is warm.      Capillary Refill: Capillary refill takes less than 2 seconds.      Coloration: Skin is not jaundiced or pale.   Neurological:      General: No focal deficit present.      Mental Status: She is alert and oriented to person, place, and time. Mental status is at baseline.      Cranial Nerves: No cranial nerve deficit.      Sensory: No sensory deficit.      Motor: No weakness.      Coordination: Coordination normal.   Psychiatric:         Mood and Affect: Mood normal.         Behavior: Behavior normal.         Laboratory:  Recent Labs     02/19/22  1825 02/20/22  1843   WBC 11.8* 18.3*   RBC 4.21 4.20   HEMOGLOBIN 13.5 13.5    HEMATOCRIT 41.8 40.6   MCV 99.3* 96.7   MCH 32.1 32.1   MCHC 32.3* 33.3*   RDW 55.8* 54.8*   PLATELETCT 637* 666*   MPV 9.5 9.7     Recent Labs     02/19/22 1825 02/20/22 1843 02/20/22 2200   SODIUM 136 132* 136   POTASSIUM 4.2 3.8 3.5*   CHLORIDE 105 104 110   CO2 16* 11* 11*   GLUCOSE 94 116* 112*   BUN 13 17 14   CREATININE 0.87 1.12 0.80   CALCIUM 9.7 9.4 7.4*     Recent Labs     02/19/22 1825 02/20/22 1843 02/20/22 2200   ALTSGPT 6 5  --    ASTSGOT 10* 12  --    ALKPHOSPHAT 158* 143*  --    TBILIRUBIN 0.2 0.2  --    LIPASE 33 24  --    GLUCOSE 94 116* 112*     Imaging:  CT-ABDOMEN-PELVIS WITH   Final Result      1.  Inflammation in the medial left thigh/perineum, similar to the prior exam. No drainable fluid collection is identified      2.  Stool volume appears increased.      3.  Bilateral nephrolithiasis          no X-Ray or EKG requiring interpretation    Assessment/Plan:  I anticipate this patient is appropriate for observation status at this time.    * Intractable nausea and vomiting- (present on admission)  Assessment & Plan  NPO   CT A/P negative for acute abdominal pathology  S/P 3 liters of IVF continue with IVF   Monitor BMP   Zofran prn   Serial abdominal exams     ACP (advance care planning)  Assessment & Plan  Patient wishes to be DNR/DNI. Time spent 8 minutes.     Bilateral nephrolithiasis  Assessment & Plan  Asymptomatic   Monitor     High anion gap metabolic acidosis  Assessment & Plan  Likely secondary to dehydration   Continue with IVF  Monitor BMP     Arthritis, rheumatoid (HCC)- (present on admission)  Assessment & Plan  Resume home etanercept weekly     Tobacco dependence- (present on admission)  Assessment & Plan  Nicotine patch protocol       VTE prophylaxis: enoxaparin ppx

## 2022-02-21 NOTE — CARE PLAN
The patient is Watcher - Medium risk of patient condition declining or worsening    Shift Goals  Clinical Goals: IV abx, nausea and pain control  Patient Goals: rest  Family Goals: N/A    Progress made toward(s) clinical / shift goals:    Problem: Pain - Standard  Goal: Alleviation of pain or a reduction in pain to the patient’s comfort goal  Outcome: Progressing     Problem: Knowledge Deficit - Standard  Goal: Patient and family/care givers will demonstrate understanding of plan of care, disease process/condition, diagnostic tests and medications  Outcome: Progressing     Problem: Depression  Goal: Patient and family/caregiver will verbalize accurate information about at least two of the possible causes of depression, three-four of the signs and symptoms of depression  Outcome: Progressing     Problem: Provide Safe Environment  Goal: Suicide environmental safety, protocols, policies, and practices will be implemented  Outcome: Progressing     Problem: Psychosocial  Goal: Patient's ability to identify and develop effective coping behaviors will improve  Outcome: Progressing     Problem: Psychosocial  Goal: Patient's ability to identify and utilize available support systems will improve  Outcome: Progressing       Patient is not progressing towards the following goals:

## 2022-02-21 NOTE — ED NOTES
Report received from prior RN. Pt Aox4. Resp normal/unlabored. Bed side rails up/in low position. Pt updated to POC and all questions answered.     ERP at bedside, RN chaperone for assessment of groin.

## 2022-02-21 NOTE — ED NOTES
Pt ambulated to restroom, pt complaining of no pain relief with recent med admin, ERP informed of pt's pain status

## 2022-02-21 NOTE — PROGRESS NOTES
Hospital Medicine Daily Progress Note    Date of Service  2/21/2022    Chief Complaint  Mary Martinez is a 51 y.o. female admitted 2/20/2022 with abdominal pain, nausea and vomiting    Hospital Course  51-year-old female with a past medical history of rheumatoid arthritis, depression who presented with epigastric abdominal pain with intractable nausea and vomiting.  CT abdomen revealed no acute abdominal findings.  Patient was noted to have leukocytosis and anion gap metabolic acidosis and sodium 132    Interval Problem Update  Patient continues to report abdominal pain with some nausea and unable to tolerate a diet.  I suspect her symptoms are secondary to gastroenteritis.  I have started her on IV Protonix.  Continue IV fluid hydration.  Patient's procalcitonin returned elevated at 48.  Continue IV antibiotics and follow blood cultures.  Patient denies any active suicidal ideation. K is low, started on IV replacement    I have personally seen and examined the patient at bedside. I discussed the plan of care with patient.    Consultants/Specialty  None    Code Status  DNAR/DNI    Disposition  Patient is not medically cleared for discharge.   Anticipate discharge to to home with close outpatient follow-up.  I have placed the appropriate orders for post-discharge needs.    Review of Systems  Review of Systems   Constitutional: Negative for chills, diaphoresis and fever.   HENT: Negative for hearing loss and sore throat.    Eyes: Negative for blurred vision.   Respiratory: Negative for cough, sputum production, shortness of breath and wheezing.    Cardiovascular: Negative for chest pain, palpitations and leg swelling.   Gastrointestinal: Positive for abdominal pain, nausea and vomiting. Negative for blood in stool and diarrhea.   Genitourinary: Negative for dysuria, flank pain and urgency.   Musculoskeletal: Negative for back pain, joint pain, myalgias and neck pain.   Skin: Negative for rash.   Neurological:  Negative for dizziness, focal weakness, seizures and headaches.   Endo/Heme/Allergies: Does not bruise/bleed easily.   Psychiatric/Behavioral: Negative for suicidal ideas.   All other systems reviewed and are negative.       Physical Exam  Temp:  [36.3 °C (97.4 °F)-38.1 °C (100.6 °F)] 37.2 °C (99 °F)  Pulse:  [77-97] 90  Resp:  [16-20] 16  BP: ()/(51-81) 104/68  SpO2:  [95 %-98 %] 96 %    Physical Exam  Vitals and nursing note reviewed.   Constitutional:       General: She is not in acute distress.     Appearance: Normal appearance.   HENT:      Head: Normocephalic and atraumatic.      Nose: Nose normal.      Mouth/Throat:      Mouth: Mucous membranes are moist.   Eyes:      Extraocular Movements: Extraocular movements intact.      Conjunctiva/sclera: Conjunctivae normal.      Pupils: Pupils are equal, round, and reactive to light.   Cardiovascular:      Rate and Rhythm: Normal rate and regular rhythm.      Pulses: Normal pulses.      Heart sounds: Normal heart sounds.   Pulmonary:      Effort: Pulmonary effort is normal. No respiratory distress.      Breath sounds: Normal breath sounds. No wheezing, rhonchi or rales.   Abdominal:      General: Bowel sounds are normal. There is no distension.      Palpations: Abdomen is soft.      Tenderness: There is abdominal tenderness (epigastric).   Musculoskeletal:         General: No swelling or tenderness. Normal range of motion.      Cervical back: Normal range of motion and neck supple.   Lymphadenopathy:      Cervical: No cervical adenopathy.   Skin:     General: Skin is warm.      Coloration: Skin is not jaundiced.      Findings: No rash.   Neurological:      General: No focal deficit present.      Mental Status: She is alert and oriented to person, place, and time.      Cranial Nerves: No cranial nerve deficit.      Motor: No weakness.   Psychiatric:         Mood and Affect: Mood normal.         Behavior: Behavior normal.         Fluids    Intake/Output Summary  (Last 24 hours) at 2/21/2022 1048  Last data filed at 2/20/2022 2242  Gross per 24 hour   Intake 2000 ml   Output --   Net 2000 ml       Laboratory  Recent Labs     02/19/22  1825 02/20/22  1843   WBC 11.8* 18.3*   RBC 4.21 4.20   HEMOGLOBIN 13.5 13.5   HEMATOCRIT 41.8 40.6   MCV 99.3* 96.7   MCH 32.1 32.1   MCHC 32.3* 33.3*   RDW 55.8* 54.8*   PLATELETCT 637* 666*   MPV 9.5 9.7     Recent Labs     02/20/22  1843 02/20/22  2200 02/21/22  0220   SODIUM 132* 136 136   POTASSIUM 3.8 3.5* 3.4*   CHLORIDE 104 110 112   CO2 11* 11* 11*   GLUCOSE 116* 112* 90   BUN 17 14 12   CREATININE 1.12 0.80 0.77   CALCIUM 9.4 7.4* 7.7*                   Imaging  DX-CHEST-PORTABLE (1 VIEW)   Final Result         1.  No acute cardiopulmonary disease.   2.  Trace bilateral pleural effusions      CT-ABDOMEN-PELVIS WITH   Final Result      1.  Inflammation in the medial left thigh/perineum, similar to the prior exam. No drainable fluid collection is identified      2.  Stool volume appears increased.      3.  Bilateral nephrolithiasis           Assessment/Plan  * Intractable nausea and vomiting- (present on admission)  Assessment & Plan  IVF hydration  IV Zofran   Serial abdominal exams     SIRS (systemic inflammatory response syndrome) (HCC)  Assessment & Plan  SIRS criteria identified on my evaluation include:  Tachycardia, with heart rate greater than 90 BPM and Leukocytosis, with WBC greater than 12,000  SIRS is non-infectious, the patient does not have sepsis  Follow blood cultures  Procalcitonin was elevated at 40, continue empiric IV antibiotics de-escalate as appropriate      Epigastric abdominal pain- (present on admission)  Assessment & Plan  Likely secondary to gastritis  Started on IV Protonix  Clear liquid diet, advance as tolerated    ACP (advance care planning)  Assessment & Plan  Patient wishes to be DNR/DNI. Time spent 8 minutes.     Bilateral nephrolithiasis  Assessment & Plan  Asymptomatic   Monitor     Hypokalemia-  (present on admission)  Assessment & Plan  Replace with IV kcl    High anion gap metabolic acidosis  Assessment & Plan  Likely secondary to dehydration   Continue with IVF  Monitor BMP     Arthritis, rheumatoid (HCC)- (present on admission)  Assessment & Plan  Resume home etanercept weekly     Tobacco dependence- (present on admission)  Assessment & Plan  Nicotine patch protocol        VTE prophylaxis: enoxaparin ppx    I have performed a physical exam and reviewed and updated ROS and Plan today (2/21/2022). In review of yesterday's note (2/20/2022), there are no changes except as documented above.

## 2022-02-21 NOTE — PROGRESS NOTES
"Nursing Note    Neuro: A/o x4    Pain: Abd pain     Cardio: SR, VSS    Respiratory:  RA, Clear    GI:  NPO w/ Meds, active bs, nausea    : Voids    Activity: OOB to Bathroom    Lines/Tubes/Drains: PIV    Skin: Cyst in Left upper Thigh    Gtt:  NS 75ml/hr    Shift Summary:     No acute issues overnight. Abdominal pain/ Nausea    /72   Pulse 92   Temp 37.8 °C (100 °F) (Temporal)   Resp 18   Ht 1.6 m (5' 2.99\")   Wt 49.1 kg (108 lb 3.9 oz)   LMP 09/21/2011   SpO2 95%   BMI 19.18 kg/m²                 "

## 2022-02-21 NOTE — ASSESSMENT & PLAN NOTE
SIRS criteria identified on my evaluation include:  Tachycardia, with heart rate greater than 90 BPM and Leukocytosis, with WBC greater than 12,000  SIRS is non-infectious, the patient does not have sepsis  Follow blood cultures  Procalcitonin was elevated at 40, continue empiric IV antibiotics de-escalate as appropriate

## 2022-02-22 ENCOUNTER — PATIENT OUTREACH (OUTPATIENT)
Dept: HEALTH INFORMATION MANAGEMENT | Facility: OTHER | Age: 52
End: 2022-02-22
Payer: MEDICAID

## 2022-02-22 VITALS
BODY MASS INDEX: 19.18 KG/M2 | WEIGHT: 108.25 LBS | RESPIRATION RATE: 17 BRPM | SYSTOLIC BLOOD PRESSURE: 109 MMHG | TEMPERATURE: 98.2 F | OXYGEN SATURATION: 94 % | DIASTOLIC BLOOD PRESSURE: 66 MMHG | HEART RATE: 83 BPM | HEIGHT: 63 IN

## 2022-02-22 LAB
ANION GAP SERPL CALC-SCNC: 13 MMOL/L (ref 7–16)
BASOPHILS # BLD AUTO: 1 % (ref 0–1.8)
BASOPHILS # BLD: 0.1 K/UL (ref 0–0.12)
BUN SERPL-MCNC: 8 MG/DL (ref 8–22)
CALCIUM SERPL-MCNC: 8.5 MG/DL (ref 8.5–10.5)
CHLORIDE SERPL-SCNC: 111 MMOL/L (ref 96–112)
CO2 SERPL-SCNC: 13 MMOL/L (ref 20–33)
CREAT SERPL-MCNC: 0.71 MG/DL (ref 0.5–1.4)
EOSINOPHIL # BLD AUTO: 0.16 K/UL (ref 0–0.51)
EOSINOPHIL NFR BLD: 1.6 % (ref 0–6.9)
ERYTHROCYTE [DISTWIDTH] IN BLOOD BY AUTOMATED COUNT: 55.8 FL (ref 35.9–50)
GLUCOSE SERPL-MCNC: 95 MG/DL (ref 65–99)
HCT VFR BLD AUTO: 32.9 % (ref 37–47)
HGB BLD-MCNC: 10.7 G/DL (ref 12–16)
IMM GRANULOCYTES # BLD AUTO: 0.07 K/UL (ref 0–0.11)
IMM GRANULOCYTES NFR BLD AUTO: 0.7 % (ref 0–0.9)
LYMPHOCYTES # BLD AUTO: 1.85 K/UL (ref 1–4.8)
LYMPHOCYTES NFR BLD: 19 % (ref 22–41)
MCH RBC QN AUTO: 32 PG (ref 27–33)
MCHC RBC AUTO-ENTMCNC: 32.5 G/DL (ref 33.6–35)
MCV RBC AUTO: 98.5 FL (ref 81.4–97.8)
MONOCYTES # BLD AUTO: 0.95 K/UL (ref 0–0.85)
MONOCYTES NFR BLD AUTO: 9.7 % (ref 0–13.4)
NEUTROPHILS # BLD AUTO: 6.62 K/UL (ref 2–7.15)
NEUTROPHILS NFR BLD: 68 % (ref 44–72)
NRBC # BLD AUTO: 0 K/UL
NRBC BLD-RTO: 0 /100 WBC
PLATELET # BLD AUTO: 468 K/UL (ref 164–446)
PMV BLD AUTO: 9.5 FL (ref 9–12.9)
POTASSIUM SERPL-SCNC: 4.3 MMOL/L (ref 3.6–5.5)
RBC # BLD AUTO: 3.34 M/UL (ref 4.2–5.4)
SODIUM SERPL-SCNC: 137 MMOL/L (ref 135–145)
WBC # BLD AUTO: 9.8 K/UL (ref 4.8–10.8)

## 2022-02-22 PROCEDURE — 700101 HCHG RX REV CODE 250: Performed by: STUDENT IN AN ORGANIZED HEALTH CARE EDUCATION/TRAINING PROGRAM

## 2022-02-22 PROCEDURE — 96372 THER/PROPH/DIAG INJ SC/IM: CPT

## 2022-02-22 PROCEDURE — 85025 COMPLETE CBC W/AUTO DIFF WBC: CPT

## 2022-02-22 PROCEDURE — 700105 HCHG RX REV CODE 258: Performed by: STUDENT IN AN ORGANIZED HEALTH CARE EDUCATION/TRAINING PROGRAM

## 2022-02-22 PROCEDURE — 96366 THER/PROPH/DIAG IV INF ADDON: CPT

## 2022-02-22 PROCEDURE — 700111 HCHG RX REV CODE 636 W/ 250 OVERRIDE (IP): Performed by: NURSE PRACTITIONER

## 2022-02-22 PROCEDURE — 700102 HCHG RX REV CODE 250 W/ 637 OVERRIDE(OP): Performed by: INTERNAL MEDICINE

## 2022-02-22 PROCEDURE — G0378 HOSPITAL OBSERVATION PER HR: HCPCS

## 2022-02-22 PROCEDURE — A9270 NON-COVERED ITEM OR SERVICE: HCPCS | Performed by: INTERNAL MEDICINE

## 2022-02-22 PROCEDURE — 99217 PR OBSERVATION CARE DISCHARGE: CPT | Performed by: STUDENT IN AN ORGANIZED HEALTH CARE EDUCATION/TRAINING PROGRAM

## 2022-02-22 PROCEDURE — 700102 HCHG RX REV CODE 250 W/ 637 OVERRIDE(OP): Performed by: STUDENT IN AN ORGANIZED HEALTH CARE EDUCATION/TRAINING PROGRAM

## 2022-02-22 PROCEDURE — 80048 BASIC METABOLIC PNL TOTAL CA: CPT

## 2022-02-22 PROCEDURE — 700101 HCHG RX REV CODE 250: Performed by: INTERNAL MEDICINE

## 2022-02-22 PROCEDURE — A9270 NON-COVERED ITEM OR SERVICE: HCPCS | Performed by: STUDENT IN AN ORGANIZED HEALTH CARE EDUCATION/TRAINING PROGRAM

## 2022-02-22 PROCEDURE — 96376 TX/PRO/DX INJ SAME DRUG ADON: CPT

## 2022-02-22 PROCEDURE — 700111 HCHG RX REV CODE 636 W/ 250 OVERRIDE (IP): Performed by: STUDENT IN AN ORGANIZED HEALTH CARE EDUCATION/TRAINING PROGRAM

## 2022-02-22 RX ORDER — AMOXICILLIN 250 MG
1 CAPSULE ORAL DAILY
Status: DISCONTINUED | OUTPATIENT
Start: 2022-02-22 | End: 2022-02-22 | Stop reason: HOSPADM

## 2022-02-22 RX ORDER — METRONIDAZOLE 500 MG/1
500 TABLET ORAL EVERY 8 HOURS
Qty: 9 TABLET | Refills: 0 | Status: SHIPPED | OUTPATIENT
Start: 2022-02-22 | End: 2022-02-25

## 2022-02-22 RX ORDER — FAMOTIDINE 20 MG/1
20 TABLET, FILM COATED ORAL 2 TIMES DAILY
Qty: 28 TABLET | Refills: 0 | Status: SHIPPED | OUTPATIENT
Start: 2022-02-22 | End: 2022-03-08

## 2022-02-22 RX ORDER — POLYETHYLENE GLYCOL 3350 17 G/17G
1 POWDER, FOR SOLUTION ORAL DAILY
Status: DISCONTINUED | OUTPATIENT
Start: 2022-02-22 | End: 2022-02-22 | Stop reason: HOSPADM

## 2022-02-22 RX ORDER — CEFDINIR 300 MG/1
300 CAPSULE ORAL 2 TIMES DAILY
Qty: 6 CAPSULE | Refills: 0 | Status: SHIPPED | OUTPATIENT
Start: 2022-02-22 | End: 2022-02-25

## 2022-02-22 RX ADMIN — MORPHINE SULFATE 2 MG: 4 INJECTION INTRAVENOUS at 02:37

## 2022-02-22 RX ADMIN — METRONIDAZOLE 500 MG: 500 INJECTION, SOLUTION INTRAVENOUS at 05:57

## 2022-02-22 RX ADMIN — SODIUM BICARBONATE 650 MG: 650 TABLET ORAL at 06:10

## 2022-02-22 RX ADMIN — CEFEPIME 2 G: 2 INJECTION, POWDER, FOR SOLUTION INTRAVENOUS at 05:57

## 2022-02-22 RX ADMIN — NICOTINE TRANSDERMAL SYSTEM 21 MG: 21 PATCH, EXTENDED RELEASE TRANSDERMAL at 05:56

## 2022-02-22 RX ADMIN — OMEPRAZOLE 20 MG: 20 CAPSULE, DELAYED RELEASE ORAL at 05:56

## 2022-02-22 RX ADMIN — POTASSIUM CHLORIDE AND SODIUM CHLORIDE: 900; 300 INJECTION, SOLUTION INTRAVENOUS at 01:50

## 2022-02-22 RX ADMIN — ENOXAPARIN SODIUM 40 MG: 40 INJECTION SUBCUTANEOUS at 05:58

## 2022-02-22 RX ADMIN — MORPHINE SULFATE 2 MG: 4 INJECTION INTRAVENOUS at 08:38

## 2022-02-22 ASSESSMENT — PAIN DESCRIPTION - PAIN TYPE: TYPE: ACUTE PAIN

## 2022-02-22 NOTE — PROGRESS NOTES
Patient discharged home. Patient AOX 4.  IV removed, discharge instructions provided to patient and reviewed with them. All questions answered, patient provided copy of discharge instructions and instructed on when to F/U with MD. Patient wheeled off unit. Patient discharged into the care of her spouse.

## 2022-02-22 NOTE — DISCHARGE INSTRUCTIONS
Discharge Instructions    Discharged to home by car with relative. Discharged via wheelchair, hospital escort: Yes.  Special equipment needed: Not Applicable    Be sure to schedule a follow-up appointment with your primary care doctor or any specialists as instructed.     Discharge Plan:   Diet Plan: Discussed  Activity Level: Discussed  Confirmed Follow up Appointment: Patient to Call and Schedule Appointment  Confirmed Symptoms Management: Discussed  Medication Reconciliation Updated: Yes  Influenza Vaccine Indication: Patient Refuses    I understand that a diet low in cholesterol, fat, and sodium is recommended for good health. Unless I have been given specific instructions below for another diet, I accept this instruction as my diet prescription.   Other diet: as tolerated    Special Instructions: None    · Is patient discharged on Warfarin / Coumadin?   No     Depression / Suicide Risk    As you are discharged from this [facility] facility, it is important to learn how to keep safe from harming yourself.    Recognize the warning signs:  · Abrupt changes in personality, positive or negative- including increase in energy   · Giving away possessions  · Change in eating patterns- significant weight changes-  positive or negative  · Change in sleeping patterns- unable to sleep or sleeping all the time   · Unwillingness or inability to communicate  · Depression  · Unusual sadness, discouragement and loneliness  · Talk of wanting to die  · Neglect of personal appearance   · Rebelliousness- reckless behavior  · Withdrawal from people/activities they love  · Confusion- inability to concentrate     If you or a loved one observes any of these behaviors or has concerns about self-harm, here's what you can do:  · Talk about it- your feelings and reasons for harming yourself  · Remove any means that you might use to hurt yourself (examples: pills, rope, extension cords, firearm)  · Get professional help from the community  (Mental Health, Substance Abuse, psychological counseling)  · Do not be alone:Call your Safe Contact- someone whom you trust who will be there for you.  · Call your local CRISIS HOTLINE 970-7171 or 592-732-7401  · Call your local Children's Mobile Crisis Response Team [place] (609) 439-4041 or www.Vusay  · Call the toll free National Suicide Prevention Hotlines   · National Suicide Prevention Lifeline 137-056-NPFG (1194)  · [facility] Network 800-SUICIDE (432-0953)      Metabolic Acidosis  Metabolic acidosis is a condition in which there is too much acid in the blood. It happens when certain chemicals in the body's cells are too high or too low. This condition can happen at any age, and there are many different causes. It may be a symptom of a sudden, short-term (acute) condition, or the result of a long-term (chronic) condition.  Metabolic acidosis can be mild or it can be severe and life-threatening, depending on the cause. It can be reversed if the cause is identified and properly treated. This condition is a medical emergency.  What are the causes?  This condition may be caused by:  · The body producing too much acid.  · Losing too much of a chemical that balances acid levels (bicarbonate). This can result from diarrhea or from certain surgeries on the stomach and intestines.  · Extra buildup of acidic chemicals (ketone bodies) in the blood due to untreated type 1 diabetes.  · Extra buildup of a chemical (lactic acid) that forms when the body is low on oxygen. This can result from:  ? The heart, lungs, liver, kidneys, or brain not getting enough blood, oxygen, and nutrients (shock).  ? Intense physical activity.  ? Certain diseases.  ? Serious infections.  · Exposure to a poisonous substance (toxin), such as:  ? Carbon monoxide.  ? Cyanide.  ? Antifreeze (ethylene glycol).  ? Methanol.  · Certain medicines, such as acetazolamide or large doses of aspirin.  · Kidney disease or kidney  infection.  · Too much iron in the body.  · Excessive alcohol use.  · Conditions that cause you to have a low level of red blood cells (anemia).  What are the signs or symptoms?  Symptoms of this condition vary depending on the cause and severity. Common symptoms include:  · Rapid breathing.  · Difficulty breathing.  · Nausea or vomiting.  · Headache.  · Confusion.  · Weakness.  · Feeling restless.  · Feeling drowsy and emotionless (lethargy).  Mild acidosis may not cause any symptoms. Severe acidosis can result in shock, coma, or death.  How is this diagnosed?  This condition may be diagnosed based on:  · A physical exam.  · Your medical history.  · Blood tests. These may include:  ? An arterial blood gas (ABG) test or a venous blood gas (VBG) test. These tests measure the acidity levels (pH balance) of your blood.  ? A serum electrolytes test. This test measures the amounts of minerals in your blood.  · Urine tests.  How is this treated?  Treatment for metabolic acidosis depends on the underlying condition and the severity of symptoms. The goal of treatment is to balance the amount of acid in the body and to treat the underlying cause.  Mild metabolic acidosis may be treated with:  · Fluids given through an IV.  · Medicines.  · Close monitoring with blood tests.  Severe forms of metabolic acidosis may require:  · Hospitalization and close monitoring with blood tests.  · Medicines or procedures.  · Bicarbonates. These may be given through an IV if your condition is very severe.  Follow these instructions at home:    · Take over-the-counter and prescription medicines only as told by your health care provider.  · If you were prescribed an antibiotic medicine, take it as told by your health care provider. Do not stop taking the antibiotic even if you start to feel better.  · Drink enough fluid to keep your urine pale yellow.  · Do not drink alcohol.  · Follow instructions from your health care provider about eating or  drinking restrictions.  · If you have diabetes, check your urine for ketones when you are ill and as told by your health care provider.  · Pay attention to your symptoms for any changes.  · Keep all follow-up visits as told by your health care provider. This is important.  Contact a health care provider if you have:  · Any new symptoms.  · Side effects from medicines you are taking.  · Nausea, vomiting, or diarrhea that does not get better with medicine or that gets worse.  · Body aches or lethargy that gets worse.  · Dark urine or you urinate less often than you usually do.  · A decreased appetite.  · A fever.  Get help right away if you:  · Have shortness of breath, chest pain, or a fast heartbeat (palpitations).  · Feel like you are losing consciousness, or you faint.  · Feel drowsy or confused.  · Have severe pain in your joints and limbs.  · Have severe abdominal pain.  These symptoms may represent a serious problem that is an emergency. Do not wait to see if the symptoms will go away. Get medical help right away. Call your local emergency services (911 in the [place] Do not drive yourself to the hospital.  Summary  · Metabolic acidosis is a condition in which there is too much acid in the blood. It happens because of a chemical imbalance in your cells in the body.  · Metabolic acidosis can be mild or it can be severe and life-threatening, depending on the cause. Metabolic acidosis can be corrected if the cause is identified and properly treated.  · If you are at risk for this condition, be aware of symptoms of metabolic acidosis.  · Metabolic acidosis is a medical emergency.  This information is not intended to replace advice given to you by your health care provider. Make sure you discuss any questions you have with your health care provider.  Document Released: 04/03/2012 Document Revised: 02/05/2019 Document Reviewed: 02/05/2019  "[employer] Patient Education © 2020 "[employer] Inc.    Famotidine tablets or  gelcaps  What is this medicine?  FAMOTIDINE (fa ETTA galarzaen) is a type of antihistamine that blocks the release of stomach acid. It is used to treat stomach or intestinal ulcers. It can also relieve heartburn from acid reflux.  This medicine may be used for other purposes; ask your health care provider or pharmacist if you have questions.  COMMON BRAND NAME(S): Heartburn Relief, Pepcid, Pepcid AC, Pepcid AC Maximum Strength  What should I tell my health care provider before I take this medicine?  They need to know if you have any of these conditions:  · kidney or liver disease  · trouble swallowing  · an unusual or allergic reaction to famotidine, other medicines, foods, dyes, or preservatives  · pregnant or trying to get pregnant  · breast-feeding  How should I use this medicine?  Take this medicine by mouth with a glass of water. Follow the directions on the prescription label. If you only take this medicine once a day, take it at bedtime. Take your doses at regular intervals. Do not take your medicine more often than directed.  Talk to your pediatrician regarding the use of this medicine in children. Special care may be needed.  Overdosage: If you think you have taken too much of this medicine contact a poison control center or emergency room at once.  NOTE: This medicine is only for you. Do not share this medicine with others.  What if I miss a dose?  If you miss a dose, take it as soon as you can. If it is almost time for your next dose, take only that dose. Do not take double or extra doses.  What may interact with this medicine?  · delavirdine  · itraconazole  · ketoconazole  This list may not describe all possible interactions. Give your health care provider a list of all the medicines, herbs, non-prescription drugs, or dietary supplements you use. Also tell them if you smoke, drink alcohol, or use illegal drugs. Some items may interact with your medicine.  What should I watch for while using this  medicine?  Tell your doctor or health care professional if your condition does not start to get better or if it gets worse. Finish the full course of tablets prescribed, even if you feel better.  Do not take with aspirin, ibuprofen or other antiinflammatory medicines. These can make your condition worse.  Do not smoke cigarettes or drink alcohol. These cause irritation in your stomach and can increase the time it will take for ulcers to heal.  If you get black, tarry stools or vomit up what looks like coffee grounds, call your doctor or health care professional at once. You may have a bleeding ulcer.  This medicine may cause a decrease in vitamin B12. You should make sure that you get enough vitamin B12 while you are taking this medicine. Discuss the foods you eat and the vitamins you take with your health care professional.  What side effects may I notice from receiving this medicine?  Side effects that you should report to your doctor or health care professional as soon as possible:  · agitation, nervousness  · confusion  · hallucinations  · skin rash, itching  Side effects that usually do not require medical attention (report to your doctor or health care professional if they continue or are bothersome):  · constipation  · diarrhea  · dizziness  · headache  This list may not describe all possible side effects. Call your doctor for medical advice about side effects. You may report side effects to FDA at 4-009-FDA-3404.  Where should I keep my medicine?  Keep out of the reach of children.  Store at room temperature between 15 and 30 degrees C (59 and 86 degrees F). Do not freeze. Throw away any unused medicine after the expiration date.  NOTE: This sheet is a summary. It may not cover all possible information. If you have questions about this medicine, talk to your doctor, pharmacist, or health care provider.  © 2020 [employer] (2018-08-03 13:17:50)  Metronidazole tablets or capsules  What is this  medicine?  METRONIDAZOLE (me trovannesa NI da zole) is an antiinfective. It is used to treat certain kinds of bacterial and protozoal infections. It will not work for colds, flu, or other viral infections.  This medicine may be used for other purposes; ask your health care provider or pharmacist if you have questions.  COMMON BRAND NAME(S): Flagyl  What should I tell my health care provider before I take this medicine?  They need to know if you have any of these conditions:  · Cockayne syndrome  · history of blood diseases, like sickle cell anemia or leukemia  · history of yeast infection  · if you often drink alcohol  · liver disease  · an unusual or allergic reaction to metronidazole, nitroimidazoles, or other medicines, foods, dyes, or preservatives  · pregnant or trying to get pregnant  · breast-feeding  How should I use this medicine?  Take this medicine by mouth with a full glass of water. Follow the directions on the prescription label. Take your medicine at regular intervals. Do not take your medicine more often than directed. Take all of your medicine as directed even if you think you are better. Do not skip doses or stop your medicine early.  Talk to your pediatrician regarding the use of this medicine in children. Special care may be needed.  Overdosage: If you think you have taken too much of this medicine contact a poison control center or emergency room at once.  NOTE: This medicine is only for you. Do not share this medicine with others.  What if I miss a dose?  If you miss a dose, take it as soon as you can. If it is almost time for your next dose, take only that dose. Do not take double or extra doses.  What may interact with this medicine?  Do not take this medicine with any of the following medications:  · alcohol or any product that contains alcohol  · cisapride  · disulfiram  · dronedarone  · pimozide  · thioridazine  This medicine may also interact with the following  medications:  · amiodarone  · birth control pills  · busulfan  · carbamazepine  · cimetidine  · cyclosporine  · fluorouracil  · lithium  · other medicines that prolong the QT interval (cause an abnormal heart rhythm) like dofetilide, ziprasidone  · phenobarbital  · phenytoin  · quinidine  · tacrolimus  · vecuronium  · warfarin  This list may not describe all possible interactions. Give your health care provider a list of all the medicines, herbs, non-prescription drugs, or dietary supplements you use. Also tell them if you smoke, drink alcohol, or use illegal drugs. Some items may interact with your medicine.  What should I watch for while using this medicine?  Tell your doctor or health care professional if your symptoms do not improve or if they get worse.  You may get drowsy or dizzy. Do not drive, use machinery, or do anything that needs mental alertness until you know how this medicine affects you. Do not stand or sit up quickly, especially if you are an older patient. This reduces the risk of dizzy or fainting spells.  Ask your doctor or health care professional if you should avoid alcohol. Many nonprescription cough and cold products contain alcohol. Metronidazole can cause an unpleasant reaction when taken with alcohol. The reaction includes flushing, headache, nausea, vomiting, sweating, and increased thirst. The reaction can last from 30 minutes to several hours.  If you are being treated for a sexually transmitted disease, avoid sexual contact until you have finished your treatment. Your sexual partner may also need treatment.  What side effects may I notice from receiving this medicine?  Side effects that you should report to your doctor or health care professional as soon as possible:  · allergic reactions like skin rash or hives, swelling of the face, lips, or tongue  · confusion  · fast, irregular heartbeat  · fever, chills, sore throat  · fever with rash, swollen lymph nodes, or swelling of the  face  · pain, tingling, numbness in the hands or feet  · redness, blistering, peeling or loosening of the skin, including inside the mouth  · seizures  · sign and symptoms of liver injury like dark yellow or brown urine; general ill feeling or flu-like symptoms; light colored stools; loss of appetite; nausea; right upper belly pain; unusually weak or tired; yellowing of the eyes or skin  · vaginal discharge, itching, or odor in women  Side effects that usually do not require medical attention (report to your doctor or health care professional if they continue or are bothersome):  · changes in taste  · diarrhea  · headache  · nausea, vomiting  · stomach pain  This list may not describe all possible side effects. Call your doctor for medical advice about side effects. You may report side effects to FDA at 7-573-FDA-8178.  Where should I keep my medicine?  Keep out of the reach of children.  Store at room temperature below 25 degrees C (77 degrees F). Protect from light. Keep container tightly closed. Throw away any unused medicine after the expiration date.  NOTE: This sheet is a summary. It may not cover all possible information. If you have questions about this medicine, talk to your doctor, pharmacist, or health care provider.  © 2020 [employer] (2019-12-10 06:52:33)

## 2022-02-22 NOTE — CARE PLAN
The patient is   Problem: Pain - Standard  Goal: Alleviation of pain or a reduction in pain to the patient’s comfort goal  Outcome: Progressing     Problem: Depression  Goal: Patient and family/caregiver will verbalize accurate information about at least two of the possible causes of depression, three-four of the signs and symptoms of depression  Outcome: Progressing     Problem: Provide Safe Environment  Goal: Suicide environmental safety, protocols, policies, and practices will be implemented  Outcome: Progressing     Problem: Psychosocial  Goal: Patient's ability to identify and develop effective coping behaviors will improve  Outcome: Progressing       Shift Goals: Will encourage pt to voice feelings   Clinical Goals: IV abx, nausea and pain control  Patient Goals: rest  Family Goals: N/A    Progress made toward(s) clinical / shift goals:  PT able to verbalize when in need and how to use call light     Patient is not progressing towards the following goals:

## 2022-02-22 NOTE — DISCHARGE PLANNING
Community Health Worker Intake  • Social determinates of health intake : Completed  • Identified barriers to : None  • Contact information provided to Mary Martinez : Yes  • Has PCP appointment scheduled for: [facility] attempted to call PCP - Long hold time  • Scheduled Food Delivery/Home Visit/Outpatient Visit:Pt was ready to Discharge  • Accepted/Declined Meds-To-Beds. No  • Inpatient assessment completed.    DELIA Diehl met with patient bedside and introduced Community Care Management and completed SDOH. Patient lives with her three children and is an active . Patient is confident in managing her own health and medications. Patient states that her son is a tech in the ED. Patient denies any barriers with food, housing, and paying for utilities/rent. Patient states she does use a walker on occasions and it is in good condition. Patient states she is current with her PCP and has a scheduled appointment and does not recall the date.     Plan:  DELIA Diehl provided all contact information and will follow up post discharge.

## 2022-02-22 NOTE — DISCHARGE SUMMARY
"Discharge Summary    CHIEF COMPLAINT ON ADMISSION  Chief Complaint   Patient presents with   • Abdominal Pain     Pt reports abdominal pain \"across whole front for a while\".     • N/V     X1 day       Reason for Admission  Abdominal Pain; NV     Admission Date  2/20/2022    CODE STATUS  DNR/DNI    HPI & HOSPITAL COURSE  51-year-old F with rheumatoid arthritis and, depression who presented with epigastric abdominal pain with intractable nausea and vomiting.  CT abdomen revealed no acute abdominal findings. Patient was noted to have leukocytosis (SIRS criteria but no obvious source; high inflammatory markers) and high anion gap metabolic acidosis and sodium 132. Empiric Abx started on admission and supportive care provided.    2/21: continues to report abdominal pain with some nausea and unable to tolerate a diet. Her symptoms are likely secondary to gastroenteritis; started her on IV Protonix. Continue IV fluid hydration.  Patient's procalcitonin returned elevated at 48.  Continue IV antibiotics and follow blood cultures. K is low, started on IV replacement    2/22 today: stable vitals and afebrile; improvement in clinical symptoms and started to tolerate oral diet. Exam at bedside grossly unremarkable except chronic joint deformities from RA. Electrolytes grossly unremarkable. BCx no growth to date. At this point, a DC plan discussed with a close outpatient follow up. IV Abx transitioned to oral and to complete 5 days total.     Therefore, she is discharged in fair and stable condition to home with close outpatient follow-up.    The patient recovered much more quickly than anticipated on admission.    Discharge Date  2/22/2022    FOLLOW UP ITEMS POST DISCHARGE  N/A    DISCHARGE DIAGNOSES  Principal Problem:    Intractable nausea and vomiting POA: Yes  Active Problems:    Tobacco dependence POA: Yes    Arthritis, rheumatoid (HCC) POA: Yes    High anion gap metabolic acidosis POA: Yes    Hypokalemia POA: Yes    " Bilateral nephrolithiasis POA: Yes    ACP (advance care planning) POA: Yes    Epigastric abdominal pain POA: Yes    SIRS (systemic inflammatory response syndrome) (HCC) POA: Yes  Resolved Problems:    * No resolved hospital problems. *      FOLLOW UP  Future Appointments   Date Time Provider Department Center   4/1/2022 11:30 AM Jayy Kerr M.D. [facility] None     Fidencio Christopher D.O.  [address]  [facility] [address]  [phone]    Call  As needed      MEDICATIONS ON DISCHARGE     Medication List      START taking these medications      Instructions   cefdinir 300 MG Caps  Commonly known as: OMNICEF   Take 1 Capsule by mouth 2 times a day for 3 days.  Dose: 300 mg     famotidine 20 MG Tabs  Commonly known as: PEPCID   Take 1 Tablet by mouth 2 times a day for 14 days.  Dose: 20 mg     metroNIDAZOLE 500 MG Tabs  Commonly known as: FLAGYL   Take 1 Tablet by mouth every 8 hours for 3 days.  Dose: 500 mg        CHANGE how you take these medications      Instructions   oxyCODONE-acetaminophen  MG Tabs  What changed: Another medication with the same name was removed. Continue taking this medication, and follow the directions you see here.  Commonly known as: PERCOCET-10   Take 1 Tablet by mouth every four hours as needed for Severe Pain for up to 30 days.  Dose: 1 Tablet        CONTINUE taking these medications      Instructions   albuterol 108 (90 Base) MCG/ACT Aers inhalation aerosol   Inhale 2 Puffs by mouth every four hours as needed for Shortness of Breath.  Dose: 2 Puff     Enbrel 50 MG/ML Sosy  Generic drug: Etanercept   INJECT 50 MG ONCE WEEKLY UNDERNEATH THE SKIN  FOR 28 DAYS     Naloxone 4 MG/0.1ML Liqd  Commonly known as: NARCAN   One spray in one nostril for overdose and call 911.     ondansetron 8 MG Tbdp  Commonly known as: ZOFRAN ODT   Take 8 mg by mouth every 8 hours as needed.  Dose: 8 mg     PARoxetine 30 MG Tabs  Commonly known as: PAXIL   Take 60 mg by mouth every evening.  Dose: 60  mg     * QUEtiapine 100 MG Tabs  Commonly known as: Seroquel   Take 100 mg by mouth every evening.  Dose: 100 mg     * QUEtiapine 25 MG Tabs  Commonly known as: Seroquel   Take 25 mg by mouth 2 times a day as needed for Anxiety.  Dose: 25 mg         * This list has 2 medication(s) that are the same as other medications prescribed for you. Read the directions carefully, and ask your doctor or other care provider to review them with you.                Allergies  Allergies   Allergen Reactions   • Penicillins Anaphylaxis and Hives     Tolerates cephalosporins; reports throat swelling with PCN   • Aripiprazole Nausea     Spasms, shaking     • Nitrous Oxide Vomiting       DIET  Regular    ACTIVITY  As tolerated.  Weight bearing as tolerated    CONSULTATIONS  N/A    PROCEDURES  N/A    LABORATORY  Lab Results   Component Value Date    SODIUM 137 02/22/2022    POTASSIUM 4.3 02/22/2022    CHLORIDE 111 02/22/2022    CO2 13 (L) 02/22/2022    GLUCOSE 95 02/22/2022    BUN 8 02/22/2022    CREATININE 0.71 02/22/2022    CREATININE 0.7 11/29/2008        Lab Results   Component Value Date    WBC 9.8 02/22/2022    HEMOGLOBIN 10.7 (L) 02/22/2022    HEMATOCRIT 32.9 (L) 02/22/2022    PLATELETCT 468 (H) 02/22/2022        Total time of the discharge process exceeds 36 minutes.

## 2022-02-23 NOTE — PROGRESS NOTES
Assumed care of pt after receiving report from night shift RN. Pt is A&Ox 4 on RA. Pt c/o 7/10 pain in ABD - medicated pt per MAR. Pt up to bathroom with steady gait and SB assist - tolerated well. Pt able to make needs known, reinforced use of call light. Bed is locked and in lowest position, call light within reach, fall precautions in place. All needs met at this time.

## 2022-02-23 NOTE — CARE PLAN
The patient is Stable - Low risk of patient condition declining or worsening    Shift Goals  Clinical Goals: Pain management  Patient Goals: Rest, pain control  Family Goals: N/A    Progress made toward(s) clinical / shift goals: Medicated pt with PRN pain medications per MAR with relief.       Problem: Pain - Standard  Goal: Alleviation of pain or a reduction in pain to the patient’s comfort goal  Outcome: Progressing     Problem: Knowledge Deficit - Standard  Goal: Patient and family/care givers will demonstrate understanding of plan of care, disease process/condition, diagnostic tests and medications  Outcome: Progressing       Patient is not progressing towards the following goals:

## 2022-02-24 ENCOUNTER — PATIENT OUTREACH (OUTPATIENT)
Dept: HEALTH INFORMATION MANAGEMENT | Facility: OTHER | Age: 52
End: 2022-02-24
Payer: MEDICAID

## 2022-02-25 NOTE — PROGRESS NOTES
DELIA Diehl called patient post discharge and patient states she is doing good and her kids are helping her out. Patient has no needs at this time.  DELIA Diehl will discharge patient from [facility] and remove from case load and master list as all goals have been met.

## 2022-04-01 ENCOUNTER — OFFICE VISIT (OUTPATIENT)
Dept: PHYSICAL MEDICINE AND REHAB | Facility: MEDICAL CENTER | Age: 52
End: 2022-04-01
Payer: MEDICAID

## 2022-04-01 VITALS
OXYGEN SATURATION: 96 % | TEMPERATURE: 97.7 F | BODY MASS INDEX: 18.44 KG/M2 | WEIGHT: 104.06 LBS | HEIGHT: 63 IN | HEART RATE: 98 BPM | SYSTOLIC BLOOD PRESSURE: 136 MMHG | DIASTOLIC BLOOD PRESSURE: 86 MMHG

## 2022-04-01 DIAGNOSIS — M05.79 RHEUMATOID ARTHRITIS INVOLVING MULTIPLE SITES WITH POSITIVE RHEUMATOID FACTOR (HCC): ICD-10-CM

## 2022-04-01 DIAGNOSIS — F11.90 CHRONIC, CONTINUOUS USE OF OPIOIDS: ICD-10-CM

## 2022-04-01 DIAGNOSIS — F32.A DEPRESSION, UNSPECIFIED DEPRESSION TYPE: ICD-10-CM

## 2022-04-01 DIAGNOSIS — M19.019 ARTHRITIS OF GLENOHUMERAL JOINT: ICD-10-CM

## 2022-04-01 PROCEDURE — 99214 OFFICE O/P EST MOD 30 MIN: CPT | Performed by: PHYSICAL MEDICINE & REHABILITATION

## 2022-04-01 RX ORDER — OXYCODONE AND ACETAMINOPHEN 10; 325 MG/1; MG/1
1 TABLET ORAL EVERY 4 HOURS PRN
Qty: 150 TABLET | Refills: 0 | Status: SHIPPED | OUTPATIENT
Start: 2022-05-03 | End: 2022-06-01 | Stop reason: SDUPTHER

## 2022-04-01 RX ORDER — OXYCODONE AND ACETAMINOPHEN 10; 325 MG/1; MG/1
1 TABLET ORAL EVERY 4 HOURS PRN
Qty: 150 TABLET | Refills: 0 | Status: SHIPPED | OUTPATIENT
Start: 2022-04-02 | End: 2022-05-02

## 2022-04-01 ASSESSMENT — PATIENT HEALTH QUESTIONNAIRE - PHQ9
CLINICAL INTERPRETATION OF PHQ2 SCORE: 4
SUM OF ALL RESPONSES TO PHQ QUESTIONS 1-9: 14
5. POOR APPETITE OR OVEREATING: 2 - MORE THAN HALF THE DAYS

## 2022-04-01 ASSESSMENT — PAIN SCALES - GENERAL: PAINLEVEL: 9=SEVERE PAIN

## 2022-04-01 ASSESSMENT — FIBROSIS 4 INDEX: FIB4 SCORE: 0.56

## 2022-04-01 NOTE — PROGRESS NOTES
Follow up patient note  Pain Medicine, Interventional spine and sports physiatry, Physical medicine rehabilitation    Date of Service:04/01/2022    Chief complaint:   Joint pain and neck pain      HISTORY    Please see new patient note dated 06/08/2018 by Dr Kerr,  for more details.     HPI  Patient identification: Mary Martinez 51 y.o. female returns for follow-up of her pain related to rheumatoid arthritis.      Interval history:     Mary returns for follow-up and reports that she has been having more difficulty with her abdominal pain again.  She was admitted to the hospital in February.  She had intractable nausea and vomiting at that time.  She also reports that she was placed on multiple antibiotics.    Additionally she has had follow-up with Dr. Valdovinos who sent her to see Dr. Oneill for a cervical epidural.  She reports that she had 2 days of relief and that the pain has since returned.    She continues to have pain in her neck, knees, shoulders.  She continues to elect for conservative care as it relates to her cervical spine.  Unfortunately I still do not have records from her last visit with Dr. Valdovinos.  We will request these again    While she does not feel that her pain medications are that effective she is finding that taking percocet 7.5/325 five a day helps manage her symptoms.  Using diclofenac gel for knees and right shoulder.     She continues to require some assistance for her ADLs including brushing and managing her hair as well as making sure the family is on site when she is taking a shower    Dr. Dela Cruz is her Rheumatologist.    PHQ-9: 14, denies suicidality   ORT: 4    ROS  See HPI    Red Flags :   Fever, Chills, Sweats: Denies  Involuntary Weight Loss: Denies  Bowel/Bladder Incontinence: Denies      PMHx:   Past Medical History:   Diagnosis Date   • Allergy    • Anemia    • Anxiety    • Arthritis     Rheumatoid -follows with rheumatologist. Has several fractures due to RA.   •  ASTHMA     inhalers prn   • Blood transfusion without reported diagnosis    • Bowel habit changes     4/24/20-constipation and diarrhea.   • Bronchitis last approx 2018   • Chronic pain 04/24/2020    Due to RA   • Dental disorder     no teeth upper-does not wear her denture. Broken teeth on bottom.   • Depression    • GERD (gastroesophageal reflux disease)    • Head ache    • Heart burn     taking famotidine   • Hiatus hernia syndrome    • History of pancreatitis    • Indigestion     taking famotidine   • Kidney disease    • Pain     everywhere   • Pneumonia 10/2019   • Psychiatric problem     anxiety and depression   • Rheumatoid arthritis(714.0) 2003   • Substance abuse (HCC)        PSHx:   Past Surgical History:   Procedure Laterality Date   • MD ENDOSCOPIC US EXAM, ESOPH  4/29/2020    Procedure: EGD, WITH ENDOSCOPIC US;  Surgeon: Jorge Brooke M.D.;  Location: [facility];  Service: Gastroenterology   • GASTROSCOPY-ENDO  4/29/2020    Procedure: GASTROSCOPY;  Surgeon: Jorge Brooke M.D.;  Location: [facility];  Service: Gastroenterology   • EGD WITH ASP/BX  4/29/2020    Procedure: EGD, WITH ASPIRATION BIOPSY - POSS FNA;  Surgeon: Jorge Brooke M.D.;  Location: [facility];  Service: Gastroenterology   • MD EXPLORATORY OF ABDOMEN N/A 10/4/2019    Procedure: LAPAROTOMY, EXPLORATORY AND REPAIR OF DUODENAL PERFORATION;  Surgeon: James Dumont M.D.;  Location: [facility];  Service: General   • COLONOSCOPY  2018    with upper endoscopy   • FINGER ARTHROPLASTY Right 6/5/2017    Procedure: FINGER ARTHROPLASTY - LONG, RING AND SMALL VOLAR PLATE;  Surgeon: Lobo Rosen M.D.;  Location: SURGERY SAME DAY [facility];  Service:    • FINGER AMPUTATION  6/5/2017    Procedure: FINGER AMPUTATION - LONG, RING AND SMALL AT THE PROXIMAL INTERPHALANGEAL JOINT;  Surgeon: Lobo Rosen M.D.;  Location: SURGERY SAME DAY [facility];   Service:    • FINGER ARTHROPLASTY Right 4/17/2017    Procedure: FINGER ARTHROPLASTY ;  Surgeon: Lobo Rosen M.D.;  Location: SURGERY SAME DAY [facility];  Service:    • FINGER AMPUTATION Right 9/14/2016    Procedure: FINGER AMPUTATION INDEX;  Surgeon: Lobo Rosen M.D.;  Location: SURGERY SAME DAY [facility];  Service:    • IRRIGATION & DEBRIDEMENT ORTHO Right 9/11/2016    Procedure: IRRIGATION & DEBRIDEMENT ORTHO - right index finger;  Surgeon: Madhu Rosen M.D.;  Location: SURGERY [facility];  Service:    • FUSION, PIP JOINT, TOE Right 8/29/2016    Procedure: RE-DO INDEX FINGER PROXIMAL INTERPHALANGEAL ARTHRODESIS;  Surgeon: Lobo Rosen M.D.;  Location: SURGERY SAME DAY [facility];  Service:    • BONE GRAFT Right 8/29/2016    Procedure: BONE GRAFT - DISTAL RADIUS ;  Surgeon: Lobo Rosen M.D.;  Location: SURGERY SAME DAY [facility];  Service:    • ARTHRODESIS Right 5/6/2016    Procedure: ARTHRODESIS INDEX FINGER PROXIMAL INTERPHALANGEAL;  Surgeon: Lobo Rosen M.D.;  Location: SURGERY SAME DAY [facility];  Service:    • FOOT RECONSTRUCTION RHEUMATIC Left 7/29/2015    Procedure: FOOT RECONSTRUCTION RHEUMATIC;  Surgeon: Heriberto Alves M.D.;  Location: [facility];  Service:    • TOE FUSION Right 5/27/2015    Procedure: TOE FUSION 1ST METATARSALPHALANGEAL JOINT;  Surgeon: Heriberto Alves M.D.;  Location: [facility];  Service:    • BONE SPUR EXCISION  5/27/2015    Procedure: BONE SPUR EXCISION METATARSAL HEAD 2-3;  Surgeon: Heriberto Alves M.D.;  Location: [facility];  Service:    • HAMMERTOE CORRECTION  5/27/2015    Procedure: HAMMERTOE CORRECTION AND SOFT TISSUE RE-ALINGMENT 2-3 ;  Surgeon: Heriberto Alves M.D.;  Location: [facility];  Service:    • DENTAL EXTRACTION(S)  2014    all of upper teeth   • ABDOMINAL ABSCESS DRAINAGE  9/27/2011    Performed by VERO WRIGHT at [facility]  ORS   • EMANUEL BY LAPAROSCOPY  9/27/2011    Performed by VERO WRIGHT at SURGERY [facility] ORS   • APPENDECTOMY  2011    Found out it had ruptured prior to abcess surgery   • REPEAT C SECTION  2008   • REPEAT C SECTION  2005   • REPEAT C SECTION  1999   • PRIMARY C SECTION  1991   • TONSILLECTOMY  1982   • WRIST EXPLORATION Left 1980's    fractured wrist-no hardware       Family history   Family History   Problem Relation Age of Onset   • Cancer Mother    • Heart Disease Mother    • Hypertension Mother    • Heart Disease Father    • Hypertension Father          Medications:   Outpatient Medications Marked as Taking for the 4/1/22 encounter (Office Visit) with Jayy Kerr M.D.   Medication Sig Dispense Refill   • [START ON 4/2/2022] oxyCODONE-acetaminophen (PERCOCET-10)  MG Tab Take 1 Tablet by mouth every four hours as needed for Severe Pain for up to 30 days. 150 Tablet 0   • [START ON 5/3/2022] oxyCODONE-acetaminophen (PERCOCET-10)  MG Tab Take 1 Tablet by mouth every four hours as needed for Severe Pain for up to 30 days. 150 Tablet 0   • QUEtiapine (SEROQUEL) 25 MG Tab Take 25 mg by mouth 2 times a day as needed for Anxiety.     • ENBREL 50 MG/ML Solution Prefilled Syringe INJECT 50 MG ONCE WEEKLY UNDERNEATH THE SKIN  FOR 28 DAYS     • Naloxone (NARCAN) 4 MG/0.1ML Liquid One spray in one nostril for overdose and call 911. 1 Package 0   • ondansetron (ZOFRAN ODT) 8 MG TABLET DISPERSIBLE Take 8 mg by mouth every 8 hours as needed.     • QUEtiapine (SEROQUEL) 100 MG Tab Take 100 mg by mouth every evening.     • PARoxetine (PAXIL) 30 MG Tab Take 60 mg by mouth every evening.     • albuterol (VENTOLIN OR PROVENTIL) 108 (90 BASE) MCG/ACT AERS inhalation aerosol Inhale 2 Puffs by mouth every four hours as needed for Shortness of Breath.         Allergies:   Allergies   Allergen Reactions   • Penicillins Anaphylaxis and Hives     Tolerates cephalosporins; reports throat swelling with PCN   •  "Aripiprazole Nausea     Spasms, shaking     • Nitrous Oxide Vomiting       Social Hx:   Social History     Socioeconomic History   • Marital status:      Spouse name: Ousmane   • Number of children: 5   • Years of education: Not on file   • Highest education level: Not on file   Occupational History   • Not on file   Tobacco Use   • Smoking status: Current Every Day Smoker     Packs/day: 1.00     Years: 30.00     Pack years: 30.00     Types: Cigarettes   • Smokeless tobacco: Never Used   Vaping Use   • Vaping Use: Never used   Substance and Sexual Activity   • Alcohol use: Never   • Drug use: Never   • Sexual activity: Not Currently   Other Topics Concern   •  Service No   • Blood Transfusions Yes   • Caffeine Concern No   • Occupational Exposure No   • Hobby Hazards No   • Sleep Concern No   • Stress Concern Yes   • Weight Concern No   • Special Diet No   • Back Care No   • Exercise Yes   • Bike Helmet Yes   • Seat Belt Yes   • Self-Exams Yes   Social History Narrative    ** Merged History Encounter **          Social Determinants of Health     Financial Resource Strain: Not on file   Food Insecurity: Not on file   Transportation Needs: Not on file   Physical Activity: Not on file   Stress: Not on file   Social Connections: Not on file   Intimate Partner Violence: Not on file   Housing Stability: Not on file         EXAMINATION     Physical Exam:   Vitals: /86 (BP Location: Right arm, Patient Position: Sitting, BP Cuff Size: Large adult)   Pulse 98   Temp 36.5 °C (97.7 °F) (Temporal)   Ht 1.6 m (5' 3\")   Wt 47.2 kg (104 lb 0.9 oz)   SpO2 96%     Constitutional:   Body Habitus: Body mass index is 18.43 kg/m².  Cooperation: Fully cooperates with exam  Appearance: Appears pale, She is wearing a mask  Respiratory-  breathing comfortable on room air, no audible wheezing  Cardiovascular- no lower extremity edema is observed.     Psychiatric- alert and oriented ×3. Normal affect. "     Musculoskeletal:  Right shoulder with crepitus and pain with ROM less than 80 degrees before pain worsens with full abduction on the left    Partial hand amputation on the right at the MCPs; ulnar deviation on the left with MCP subluxation, mild atrophy of the hand intrinsics with wasting of the thenar eminence.  Limited range of motion of the left hand performing     Gait is steady without assist device.  Mildly antalgic with increased knee flexion, particularly on the right, during gait.      MEDICAL DECISION MAKING    DATA    Labs: UDS 10/30/2018 consistent with medications.  Oxycodone and metabolites without other substances   UDS 04/19/2019 consistent with medications. Oxycodone and metabolites without other substances   UDS 07/19/2019 consistent with medications.  Oxycodone and metabolites without other substances   UDS 11/22/2019 consistent with medications.  Oxycodone and metabolites without other substances   UDS 02/20/2020 absent for Oxycodone and metabolites without other substances  UDS 06/10/2020 positive for oxycodone and metabolites without other substances  UDS 08/18/2020 positive for oxycodone and metabolites, positive for EtG without EtS  UDS 09/07/2021 negative for oxycodone and metabolites, patient was out of medication, no other abnormalities    Imaging:    Films reviewed.  These are my reads:    Xray right shoulder 08/20/2020  There is note of severe degenerative change of the glenohumeral joint.  No fracture.  Erosive changes, consistent with known history of RA    MRI cervical spine 07/31/2018:    There is is note of motion at the atlantodental level with 5mm atlantodental inverval in flexion that reduces to 1-2 mm in extension.  Mild retrolisthesis of C4 on C5 and anterolisthesis of C5- on C6.  Disc extrusion at C6-7 with mild central canal stenosis    Xray left shoulder 2/5/2018 Humeral head is elevated.  Glenohumeral joint arthritis.    Reports reviewed:    Xray right shoulder  08/20/2020 [facility]  Impression  Extensive erosive changes of the humeral head and degenerative changes of the glenohumeral joint.  Findings are concerning for inflammatory arthropathy such as rheumatoid arthritis.      Xray cervical spine 11/14/2018  1. No acute fracture  2. Persistent motion at the C1-2 joint as before, suggesting atlantoaxial instability  3. Minimal instability noted at C5-6 level as described.    MRI cervical spine 07/31/2018  1. Widening of the atlantodental interal in neutral and flexion positions with a 5mm space which reduces to 1-2 mm in extension  2. 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 Rheumatoid arthritis involving multiple sites with positive rheumatoid factor (HCC)  M05.79   2. Arthritis of glenohumeral joint  M19.019   3. Chronic, continuous use of opioids  F11.90   4. Depression, unspecified depression type  F32.A         ASSESSMENT and PLAN:     Mary Martinez 51 y.o. female with rheumatoid arthritis    Mary was seen today for follow-up.    Orders and management for this visit:    Orders Placed This Encounter   • URINE DRUG SCREEN W/CONF (SEND OUT)   • Patient has been identified as having a positive depression screening. Appropriate orders and counseling have been given.   • oxyCODONE-acetaminophen (PERCOCET-10)  MG Tab   • oxyCODONE-acetaminophen (PERCOCET-10)  MG Tab   • Consent for Opiate Prescription   • Controlled Substance Treatment Agreement       1.  Discussed that we will attempt to get records from Dr. Valdovinos.  We have discussed cervical epidural at last visit she has declined wanting to do this, but it sounds like she did have a cervical epidural with Dr. Swain few weeks ago.    2.  Encouraged her to continue to  follow-up with her primary care physician her multiple specialists regarding ongoing medical complaints; also with Dr. Dela Cruz for rheumatology  3.  Reviewed plan for repeat urine drug screen.  Continue with current dose of Percocet 10/325 number 150/month.  We discussed maintaining this current dose.  She continued maintaining regular bowel movements with over-the-counter medications.  4.  Discussed that she has ongoing depression but is working with counseling and  Psychiatry.  She denies suicidality.        In prescribing controlled substances to this patient, I certify that I have obtained and reviewed the medical history of Mary Martinez. I have also made a good aristides effort to obtain applicable records from other providers who have treated the patient and records did not demonstrate any increased risk of substance abuse that would prevent me from prescribing controlled substances.     I have conducted a physical exam and documented it. I have reviewed Ms. Martinez’s prescription history as maintained by the [place] Prescription Monitoring Program.   ORT 4, indicates moderate risk    I have assessed the patient’s risk for abuse, dependency, and addiction using the validated Opioid Risk Tool available at https://www.mdcalc.com/djhjtn-ywuf-ktvv-ort-narcotic-abuse.     Given the above, I believe the benefits of controlled substance therapy outweigh the risks. The reasons for prescribing controlled substances include non-narcotic, oral analgesic alternatives have been inadequate for pain control and in my professional opinion, controlled substances are the only reasonable choice for this patient because her pain was note adequately controlled with alternatives and she has chronic progressive disease. Accordingly, I have discussed the risk and benefits, treatment plan, and alternative therapies with the patient.       Follow up: 2 months, prn       Please note that this dictation was created using voice  recognition software. I have made every reasonable attempt to correct obvious errors but there may be errors of grammar and content that I may have overlooked prior to finalization of this note.      Jayy Kerr MD  Interventional Spine and Sports Physiatry  Physical Medicine and Rehabilitation  [facility]      PCP: Dr. Fidencio Christopher/[facility]

## 2022-05-04 NOTE — TELEPHONE ENCOUNTER
"Aurora from [facility] called with some \"concerns\".  Pt tried getting the Rx Thursday (7/16/20) because she \"might be running out of meds\" before the fill date (7/19/20)   The pharmacy expressed they couldn't fill before the 19th due to laws.  On Sunday she expressed to the pharmacy tech that if she didn't get her meds she would go through withdrawals that were \"katostrafic\". The pharmacist noticed the pt was shaky and agitated.    They released the Rx 7/19/20 as scheduled but they made her request a priority as they didn't want her to go through withdrawals and they were worried about her behavior.     Aurora was worried about possible addiction and she stated if pt continues these types of Sx and behavior she doesn't fell comfortable continuing to fill for this pt.    "
"Discussed case with Aurora, pharmacist at [facility] as patient apparently had similar behavior again this month.  No physical signs, but she was concerned about \"going into withdrawals\" if she did not get her medication.  She had not previously behaved in this way by her report.      Please call and schedule patient for office visit today or tomorrow.  Thank you.  Jayy Kerr MD"
OBGYN

## 2022-05-18 ENCOUNTER — HOSPITAL ENCOUNTER (OUTPATIENT)
Dept: LAB | Facility: MEDICAL CENTER | Age: 52
End: 2022-05-18
Attending: PHYSICAL MEDICINE & REHABILITATION
Payer: MEDICAID

## 2022-05-18 DIAGNOSIS — M05.79 RHEUMATOID ARTHRITIS INVOLVING MULTIPLE SITES WITH POSITIVE RHEUMATOID FACTOR (HCC): ICD-10-CM

## 2022-05-18 DIAGNOSIS — M19.019 ARTHRITIS OF GLENOHUMERAL JOINT: ICD-10-CM

## 2022-05-18 DIAGNOSIS — F11.90 CHRONIC, CONTINUOUS USE OF OPIOIDS: ICD-10-CM

## 2022-05-18 PROCEDURE — G0480 DRUG TEST DEF 1-7 CLASSES: HCPCS

## 2022-05-18 PROCEDURE — 80307 DRUG TEST PRSMV CHEM ANLYZR: CPT

## 2022-05-21 LAB
6MAM UR CFM-MCNC: <10 NG/ML
CODEINE UR CFM-MCNC: <20 NG/ML
HYDROCODONE UR CFM-MCNC: <20 NG/ML
HYDROMORPHONE UR CFM-MCNC: <20 NG/ML
MORPHINE UR CFM-MCNC: <20 NG/ML
NORHYDROCODONE UR CFM-MCNC: <20 NG/ML
NOROXYCODONE UR CFM-MCNC: >4000 NG/ML
OPIATES UR NOROXYM Q0836: 369 NG/ML
OXYCODONE UR CFM-MCNC: >4000 NG/ML
OXYMORPHONE UR CFM-MCNC: 190 NG/ML

## 2022-05-23 ENCOUNTER — HOSPITAL ENCOUNTER (EMERGENCY)
Facility: MEDICAL CENTER | Age: 52
End: 2022-05-23
Attending: EMERGENCY MEDICINE
Payer: MEDICAID

## 2022-05-23 ENCOUNTER — APPOINTMENT (OUTPATIENT)
Dept: RADIOLOGY | Facility: MEDICAL CENTER | Age: 52
End: 2022-05-23
Attending: EMERGENCY MEDICINE
Payer: MEDICAID

## 2022-05-23 VITALS
DIASTOLIC BLOOD PRESSURE: 71 MMHG | BODY MASS INDEX: 17.19 KG/M2 | SYSTOLIC BLOOD PRESSURE: 105 MMHG | TEMPERATURE: 97.3 F | WEIGHT: 97 LBS | RESPIRATION RATE: 20 BRPM | HEIGHT: 63 IN | OXYGEN SATURATION: 98 % | HEART RATE: 91 BPM

## 2022-05-23 DIAGNOSIS — R11.2 NAUSEA AND VOMITING, INTRACTABILITY OF VOMITING NOT SPECIFIED, UNSPECIFIED VOMITING TYPE: ICD-10-CM

## 2022-05-23 DIAGNOSIS — R10.13 EPIGASTRIC ABDOMINAL PAIN: ICD-10-CM

## 2022-05-23 LAB
ALBUMIN SERPL BCP-MCNC: 4.1 G/DL (ref 3.2–4.9)
ALBUMIN/GLOB SERPL: 1 G/DL
ALP SERPL-CCNC: 146 U/L (ref 30–99)
ALT SERPL-CCNC: <5 U/L (ref 2–50)
ANION GAP SERPL CALC-SCNC: 19 MMOL/L (ref 7–16)
AST SERPL-CCNC: 11 U/L (ref 12–45)
BASOPHILS # BLD AUTO: 0.8 % (ref 0–1.8)
BASOPHILS # BLD: 0.1 K/UL (ref 0–0.12)
BILIRUB SERPL-MCNC: 0.3 MG/DL (ref 0.1–1.5)
BUN SERPL-MCNC: 10 MG/DL (ref 8–22)
CALCIUM SERPL-MCNC: 9.4 MG/DL (ref 8.4–10.2)
CHLORIDE SERPL-SCNC: 103 MMOL/L (ref 96–112)
CO2 SERPL-SCNC: 14 MMOL/L (ref 20–33)
CREAT SERPL-MCNC: 0.9 MG/DL (ref 0.5–1.4)
EOSINOPHIL # BLD AUTO: 0.14 K/UL (ref 0–0.51)
EOSINOPHIL NFR BLD: 1.1 % (ref 0–6.9)
ERYTHROCYTE [DISTWIDTH] IN BLOOD BY AUTOMATED COUNT: 51.7 FL (ref 35.9–50)
GFR SERPLBLD CREATININE-BSD FMLA CKD-EPI: 78 ML/MIN/1.73 M 2
GLOBULIN SER CALC-MCNC: 4.2 G/DL (ref 1.9–3.5)
GLUCOSE SERPL-MCNC: 104 MG/DL (ref 65–99)
HCT VFR BLD AUTO: 42.6 % (ref 37–47)
HGB BLD-MCNC: 14.5 G/DL (ref 12–16)
IMM GRANULOCYTES # BLD AUTO: 0.07 K/UL (ref 0–0.11)
IMM GRANULOCYTES NFR BLD AUTO: 0.5 % (ref 0–0.9)
LIPASE SERPL-CCNC: 31 U/L (ref 7–58)
LYMPHOCYTES # BLD AUTO: 1.94 K/UL (ref 1–4.8)
LYMPHOCYTES NFR BLD: 15 % (ref 22–41)
MCH RBC QN AUTO: 32.8 PG (ref 27–33)
MCHC RBC AUTO-ENTMCNC: 34 G/DL (ref 33.6–35)
MCV RBC AUTO: 96.4 FL (ref 81.4–97.8)
MONOCYTES # BLD AUTO: 0.78 K/UL (ref 0–0.85)
MONOCYTES NFR BLD AUTO: 6 % (ref 0–13.4)
NEUTROPHILS # BLD AUTO: 9.88 K/UL (ref 2–7.15)
NEUTROPHILS NFR BLD: 76.6 % (ref 44–72)
NRBC # BLD AUTO: 0 K/UL
NRBC BLD-RTO: 0 /100 WBC
PLATELET # BLD AUTO: 474 K/UL (ref 164–446)
PMV BLD AUTO: 10.9 FL (ref 9–12.9)
POTASSIUM SERPL-SCNC: 3.1 MMOL/L (ref 3.6–5.5)
PROT SERPL-MCNC: 8.3 G/DL (ref 6–8.2)
RBC # BLD AUTO: 4.42 M/UL (ref 4.2–5.4)
SODIUM SERPL-SCNC: 136 MMOL/L (ref 135–145)
WBC # BLD AUTO: 12.9 K/UL (ref 4.8–10.8)

## 2022-05-23 PROCEDURE — 96374 THER/PROPH/DIAG INJ IV PUSH: CPT

## 2022-05-23 PROCEDURE — 80053 COMPREHEN METABOLIC PANEL: CPT

## 2022-05-23 PROCEDURE — 700111 HCHG RX REV CODE 636 W/ 250 OVERRIDE (IP)

## 2022-05-23 PROCEDURE — 83690 ASSAY OF LIPASE: CPT

## 2022-05-23 PROCEDURE — 36415 COLL VENOUS BLD VENIPUNCTURE: CPT

## 2022-05-23 PROCEDURE — 99285 EMERGENCY DEPT VISIT HI MDM: CPT

## 2022-05-23 PROCEDURE — 700111 HCHG RX REV CODE 636 W/ 250 OVERRIDE (IP): Performed by: EMERGENCY MEDICINE

## 2022-05-23 PROCEDURE — 96372 THER/PROPH/DIAG INJ SC/IM: CPT

## 2022-05-23 PROCEDURE — 74176 CT ABD & PELVIS W/O CONTRAST: CPT

## 2022-05-23 PROCEDURE — 700102 HCHG RX REV CODE 250 W/ 637 OVERRIDE(OP): Performed by: EMERGENCY MEDICINE

## 2022-05-23 PROCEDURE — 700105 HCHG RX REV CODE 258: Performed by: EMERGENCY MEDICINE

## 2022-05-23 PROCEDURE — A9270 NON-COVERED ITEM OR SERVICE: HCPCS | Performed by: EMERGENCY MEDICINE

## 2022-05-23 PROCEDURE — 85025 COMPLETE CBC W/AUTO DIFF WBC: CPT

## 2022-05-23 RX ORDER — PROCHLORPERAZINE EDISYLATE 5 MG/ML
10 INJECTION INTRAMUSCULAR; INTRAVENOUS ONCE
Status: COMPLETED | OUTPATIENT
Start: 2022-05-23 | End: 2022-05-23

## 2022-05-23 RX ORDER — PROMETHAZINE HYDROCHLORIDE 25 MG/1
25 SUPPOSITORY RECTAL EVERY 6 HOURS PRN
Qty: 10 SUPPOSITORY | Refills: 0 | Status: SHIPPED | OUTPATIENT
Start: 2022-05-23 | End: 2022-06-20 | Stop reason: SDUPTHER

## 2022-05-23 RX ORDER — PANTOPRAZOLE SODIUM 40 MG/1
40 TABLET, DELAYED RELEASE ORAL DAILY
Qty: 30 TABLET | Refills: 0 | Status: SHIPPED | OUTPATIENT
Start: 2022-05-23 | End: 2023-01-19

## 2022-05-23 RX ORDER — ONDANSETRON 2 MG/ML
4 INJECTION INTRAMUSCULAR; INTRAVENOUS ONCE
Status: DISCONTINUED | OUTPATIENT
Start: 2022-05-23 | End: 2022-05-23

## 2022-05-23 RX ORDER — PROCHLORPERAZINE EDISYLATE 5 MG/ML
10 INJECTION INTRAMUSCULAR; INTRAVENOUS ONCE
Status: DISCONTINUED | OUTPATIENT
Start: 2022-05-23 | End: 2022-05-23

## 2022-05-23 RX ORDER — SUCRALFATE 1 G/1
1 TABLET ORAL
Qty: 120 TABLET | Refills: 3 | Status: SHIPPED | OUTPATIENT
Start: 2022-05-23 | End: 2023-05-25

## 2022-05-23 RX ORDER — MORPHINE SULFATE 4 MG/ML
4 INJECTION INTRAVENOUS ONCE
Status: COMPLETED | OUTPATIENT
Start: 2022-05-23 | End: 2022-05-23

## 2022-05-23 RX ORDER — MORPHINE SULFATE 4 MG/ML
4 INJECTION INTRAVENOUS ONCE
Status: DISCONTINUED | OUTPATIENT
Start: 2022-05-23 | End: 2022-05-23

## 2022-05-23 RX ORDER — SODIUM CHLORIDE 9 MG/ML
1000 INJECTION, SOLUTION INTRAVENOUS ONCE
Status: COMPLETED | OUTPATIENT
Start: 2022-05-23 | End: 2022-05-23

## 2022-05-23 RX ADMIN — LIDOCAINE HYDROCHLORIDE 30 ML: 20 SOLUTION OROPHARYNGEAL at 10:43

## 2022-05-23 RX ADMIN — SODIUM CHLORIDE 1000 ML: 9 INJECTION, SOLUTION INTRAVENOUS at 10:39

## 2022-05-23 RX ADMIN — PROCHLORPERAZINE EDISYLATE 10 MG: 5 INJECTION INTRAMUSCULAR; INTRAVENOUS at 09:59

## 2022-05-23 RX ADMIN — MORPHINE SULFATE 4 MG: 4 INJECTION INTRAVENOUS at 10:00

## 2022-05-23 RX ADMIN — FAMOTIDINE 20 MG: 10 INJECTION INTRAVENOUS at 10:43

## 2022-05-23 ASSESSMENT — PAIN DESCRIPTION - DESCRIPTORS: DESCRIPTORS: OTHER (COMMENT)

## 2022-05-23 ASSESSMENT — FIBROSIS 4 INDEX: FIB4 SCORE: 0.57

## 2022-05-23 NOTE — DISCHARGE INSTRUCTIONS
Your labs show you are dehydrated, we gave you IV fluids, your CT showed kidney stones but these were not any part of your kidney drainage system that should be causing pain.  I would like you to follow-up closely with gastroenterology, I discussed your case with the gastroenterology consultants, they report that they will get you in this week.  Please call their office later today.  We will start you on an acid reducer, Carafate which helps to coat your stomach with a protective lining, and Phenergan for your nausea.

## 2022-05-23 NOTE — ED TRIAGE NOTES
"Chief Complaint   Patient presents with   • Abdominal Pain   • N/V      complains of \" severe\" abdominal pain with nausea and vomiting since last Thursday. Pt has history of esophagitis and pancreatitis.   "

## 2022-05-23 NOTE — ED NOTES
D/c pt home,multiple  rx given . Pt aware of f/u instructions , aware to return for any changes or concerns. No further questions upon d/c home from ed

## 2022-05-23 NOTE — ED PROVIDER NOTES
ED Provider Note    CHIEF COMPLAINT  Chief Complaint   Patient presents with   • Abdominal Pain   • N/V       HPI  Mary Martinez is a 52 y.o. female who presents with abdominal pain nausea and vomiting.  Patient has a history of recurrent abdominal pain, chronic pancreatitis, and severe rheumatoid arthritis.  Patient is on Enbrel and oxycodone for her chronic pain.  Patient reports that her abdominal pain has been present for the last 4 days.  She reports it is similar to pain episodes that have brought her to the emergency department for but worse.  Patient reports associated nausea and vomiting.  She reports it is mostly yellow stomach bile but she has had some green emesis and a couple episodes of bloody streaked vomit.  Patient denies any black or bloody stool.  She has a history of a hiatal hernia.  Patient is status post appendectomy and cholecystectomy around a decade ago.  Patient denies any marijuana use or any alcohol use.    REVIEW OF SYSTEMS  ROS    See HPI for further details. All other systems are negative.     PAST MEDICAL HISTORY   has a past medical history of Allergy, Anemia, Anxiety, Arthritis, ASTHMA, Blood transfusion without reported diagnosis, Bowel habit changes, Bronchitis (last approx 2018), Chronic pain (04/24/2020), Dental disorder, Depression, GERD (gastroesophageal reflux disease), Head ache, Heart burn, Hiatus hernia syndrome, History of pancreatitis, Indigestion, Kidney disease, Pain, Pneumonia (10/2019), Psychiatric problem, Rheumatoid arthritis(714.0) (2003), and Substance abuse ([facility]).    SOCIAL HISTORY  Social History     Tobacco Use   • Smoking status: Current Every Day Smoker     Packs/day: 1.00     Years: 30.00     Pack years: 30.00     Types: Cigarettes   • Smokeless tobacco: Never Used   Vaping Use   • Vaping Use: Never used   Substance and Sexual Activity   • Alcohol use: Never   • Drug use: Never   • Sexual activity: Not Currently       SURGICAL HISTORY   has a past  surgical history that includes abdominal abscess drainage (9/27/2011); toe fusion (Right, 5/27/2015); bone spur excision (5/27/2015); hammertoe correction (5/27/2015); foot reconstruction rheumatic (Left, 7/29/2015); arthrodesis (Right, 5/6/2016); fusion, pip joint, toe (Right, 8/29/2016); bone graft (Right, 8/29/2016); irrigation & debridement ortho (Right, 9/11/2016); finger amputation (Right, 9/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    CURRENT MEDICATIONS  Home Medications    **Home medications have not yet been reviewed for this encounter**         ALLERGIES  Allergies   Allergen Reactions   • Penicillins Anaphylaxis and Hives     Tolerates cephalosporins; reports throat swelling with PCN   • Aripiprazole Nausea     Spasms, shaking     • Nitrous Oxide Vomiting       PHYSICAL EXAM  Vitals:    05/23/22 0819   BP:    Pulse:    Resp: 20   Temp:    SpO2:        Physical Exam  Constitutional:       Appearance: She is well-developed.      Comments: Very low weight   HENT:      Head: Normocephalic and atraumatic.   Eyes:      Conjunctiva/sclera: Conjunctivae normal.   Cardiovascular:      Rate and Rhythm: Normal rate and regular rhythm.   Pulmonary:      Effort: Pulmonary effort is normal.      Breath sounds: Normal breath sounds.   Abdominal:      General: Bowel sounds are normal. There is no distension.      Palpations: Abdomen is soft.      Tenderness: There is abdominal tenderness in the epigastric area. There is no rebound.      Comments:  Epigastric tenderness palpation with associated rebound   Musculoskeletal:      Cervical back: Normal range of motion and neck supple.   Skin:     General: Skin is warm and dry.      Findings: No rash.   Neurological:      Mental Status: She is alert and oriented to person, place, and time.   Psychiatric:         Behavior: Behavior normal.           DIAGNOSTIC STUDIES / PROCEDURES    LABS  Results for orders placed or performed during the hospital encounter of 05/23/22   CBC WITH DIFFERENTIAL   Result Value Ref Range    WBC 12.9 (H) 4.8 - 10.8 K/uL    RBC 4.42 4.20 - 5.40 M/uL    Hemoglobin 14.5 12.0 - 16.0 g/dL    Hematocrit 42.6 37.0 - 47.0 %    MCV 96.4 81.4 - 97.8 fL    MCH 32.8 27.0 - 33.0 pg    MCHC 34.0 33.6 - 35.0 g/dL    RDW 51.7 (H) 35.9 - 50.0 fL    Platelet Count 474 (H) 164 - 446 K/uL    MPV 10.9 9.0 - 12.9 fL    Neutrophils-Polys 76.60 (H) 44.00 - 72.00 %    Lymphocytes 15.00 (L) 22.00 - 41.00 %    Monocytes 6.00 0.00 - 13.40 %    Eosinophils 1.10 0.00 - 6.90 %    Basophils 0.80 0.00 - 1.80 %    Immature Granulocytes 0.50 0.00 - 0.90 %    Nucleated RBC 0.00 /100 WBC    Neutrophils (Absolute) 9.88 (H) 2.00 - 7.15 K/uL    Lymphs (Absolute) 1.94 1.00 - 4.80 K/uL    Monos (Absolute) 0.78 0.00 - 0.85 K/uL    Eos (Absolute) 0.14 0.00 - 0.51 K/uL    Baso (Absolute) 0.10 0.00 - 0.12 K/uL    Immature Granulocytes (abs) 0.07 0.00 - 0.11 K/uL    NRBC (Absolute) 0.00 K/uL   CMP   Result Value Ref Range    Sodium 136 135 - 145 mmol/L    Potassium 3.1 (L) 3.6 - 5.5 mmol/L    Chloride 103 96 - 112 mmol/L    Co2 14 (L) 20 - 33 mmol/L    Anion Gap 19.0 (H) 7.0 - 16.0    Glucose 104 (H) 65 - 99 mg/dL    Bun 10 8 - 22 mg/dL    Creatinine 0.90 0.50 - 1.40 mg/dL    Calcium 9.4 8.4 - 10.2 mg/dL    AST(SGOT) 11 (L) 12 - 45 U/L    ALT(SGPT) <5 2 - 50 U/L    Alkaline Phosphatase 146 (H) 30 - 99 U/L    Total Bilirubin 0.3 0.1 - 1.5 mg/dL    Albumin 4.1 3.2 - 4.9 g/dL    Total Protein 8.3 (H) 6.0 - 8.2 g/dL    Globulin 4.2 (H)  1.9 - 3.5 g/dL    A-G Ratio 1.0 g/dL   LIPASE   Result Value Ref Range    Lipase 31 7 - 58 U/L   ESTIMATED GFR   Result Value Ref Range    GFR (CKD-EPI) 78 >60 mL/min/1.73 m 2         RADIOLOGY  CT-ABDOMEN-PELVIS W/O   Final Result      1.  Multiple bilateral renal calyceal stones measuring up to 5 mm in size. No evidence of ureteral stone or hydronephrosis.      2.  Prior cholecystectomy.      3.  No evidence of bowel obstruction or focal inflammatory change.              COURSE & MEDICAL DECISION MAKING  Pertinent Labs & Imaging studies reviewed. (See chart for details)    Patient here with presentation most consistent with chronic pancreatitis exacerbation.  Will check basic labs.  Patient's symptoms certainly could be from bowel obstruction, patient does have bowel movement this morning however she does have a complaints of some bilious emesis and a abdominal surgical history.  The patient with some streaking blood in her emesis, likely Rosita-Sanders, I believe Boerhaave syndrome is incredibly unlikely in this well-appearing patient.  Patient given morphine, Compazine and IV fluids for symptoms.  Patient with mild leukocytosis.  Lipase is normal.  Patient with a gap acidosis consistent with dehydration.  Patient given IV fluids.  She has been able to eat and drink here in the emergency department without issue.  Patient CT fails to reveal any acute concerning findings, she does have some kidney stones which are very unlikely to be the cause of etiology of patient's pain.  Patient case discussed with gastroenterology who graciously will see patient in an expeditious manner later this week.  Patient sent home with Phenergan, pantoprazole and Carafate for possible gastritis.  Return precautions discussed.     The patient will return for worsening symptoms and is stable at the time of discharge. The patient verbalizes understanding and will comply.    FINAL IMPRESSION     1. Nausea and vomiting, intractability of  vomiting not specified, unspecified vomiting type    2. Epigastric abdominal pain               Electronically signed by: Matthew Gambao M.D., 5/23/2022 8:33 AM

## 2022-06-01 ENCOUNTER — OFFICE VISIT (OUTPATIENT)
Dept: PHYSICAL MEDICINE AND REHAB | Facility: MEDICAL CENTER | Age: 52
End: 2022-06-01
Payer: MEDICAID

## 2022-06-01 VITALS
TEMPERATURE: 98.4 F | HEART RATE: 109 BPM | SYSTOLIC BLOOD PRESSURE: 110 MMHG | OXYGEN SATURATION: 98 % | BODY MASS INDEX: 17.73 KG/M2 | HEIGHT: 63 IN | WEIGHT: 100.09 LBS | DIASTOLIC BLOOD PRESSURE: 62 MMHG

## 2022-06-01 DIAGNOSIS — M53.2X1 ATLANTOAXIAL INSTABILITY: ICD-10-CM

## 2022-06-01 DIAGNOSIS — M21.942 HAND DEFORMITY, ACQUIRED, LEFT: ICD-10-CM

## 2022-06-01 DIAGNOSIS — R11.0 NAUSEA: ICD-10-CM

## 2022-06-01 DIAGNOSIS — M05.79 RHEUMATOID ARTHRITIS INVOLVING MULTIPLE SITES WITH POSITIVE RHEUMATOID FACTOR (HCC): ICD-10-CM

## 2022-06-01 DIAGNOSIS — F11.90 CHRONIC, CONTINUOUS USE OF OPIOIDS: ICD-10-CM

## 2022-06-01 DIAGNOSIS — M19.019 ARTHRITIS OF GLENOHUMERAL JOINT: ICD-10-CM

## 2022-06-01 PROCEDURE — 99214 OFFICE O/P EST MOD 30 MIN: CPT | Performed by: PHYSICAL MEDICINE & REHABILITATION

## 2022-06-01 RX ORDER — OXYCODONE AND ACETAMINOPHEN 10; 325 MG/1; MG/1
1 TABLET ORAL EVERY 4 HOURS PRN
Qty: 150 TABLET | Refills: 0 | Status: SHIPPED | OUTPATIENT
Start: 2022-06-01 | End: 2022-07-01

## 2022-06-01 RX ORDER — OXYCODONE AND ACETAMINOPHEN 10; 325 MG/1; MG/1
1 TABLET ORAL EVERY 4 HOURS PRN
Qty: 150 TABLET | Refills: 0 | Status: SHIPPED | OUTPATIENT
Start: 2022-07-01 | End: 2022-07-29

## 2022-06-01 RX ORDER — ONDANSETRON 4 MG/1
TABLET, ORALLY DISINTEGRATING ORAL
COMMUNITY
Start: 2022-03-09 | End: 2022-08-31

## 2022-06-01 ASSESSMENT — FIBROSIS 4 INDEX: FIB4 SCORE: 0.55

## 2022-06-01 ASSESSMENT — PAIN SCALES - GENERAL: PAINLEVEL: 10=SEVERE PAIN

## 2022-06-01 NOTE — PROGRESS NOTES
Follow up patient note  Pain Medicine, Interventional spine and sports physiatry, Physical medicine rehabilitation    Date of Service:06/01/2022    Chief complaint:   Joint pain and neck pain      HISTORY    Please see new patient note dated 06/08/2018 by Dr Kerr,  for more details.     HPI  Patient identification: Mary Martinez 52 y.o. female returns for follow-up of her pain related to rheumatoid arthritis.      Interval history:     Mary reports that she has had worsened abdominal pain.  From what she reports, she has been under evaluation with GI.  She has been very sick and does not recall the results of the last visit.  Sounds like they plan an endoscopy.  Visits to the ED.  Outpatient endoscopy not until September 2022.    Mary returns for follow-up and reports that she has been having more difficulty with her abdominal pain again.  She was admitted to the hospital in February.  She had intractable nausea and vomiting at that time.  She also reports that she was placed on multiple antibiotics.    Neck pain is not as severe for the last two months.  Pain medication seems to be working better, also abdominal pain is worse lately and this has made the shoulder and knee pain feel more manageable.    Taking percocet 10/325 five a day.  Bowel movements are regular.    She continues to require some assistance for her ADLs including brushing and managing her hair as well as making sure the family is on site when she is taking a shower    Dr. Dela Cruz is her Rheumatologist.    PHQ-9: 14, denies suicidality   ORT: 4    ROS  See HPI    Red Flags :   Fever, Chills, Sweats: Denies  Involuntary Weight Loss: Denies  Bowel/Bladder Incontinence: Denies      PMHx:   Past Medical History:   Diagnosis Date   • Allergy    • Anemia    • Anxiety    • Arthritis     Rheumatoid -follows with rheumatologist. Has several fractures due to RA.   • ASTHMA     inhalers prn   • Blood transfusion without reported diagnosis    •  Bowel habit changes     4/24/20-constipation and diarrhea.   • Bronchitis last approx 2018   • Chronic pain 04/24/2020    Due to RA   • Dental disorder     no teeth upper-does not wear her denture. Broken teeth on bottom.   • Depression    • GERD (gastroesophageal reflux disease)    • Head ache    • Heart burn     taking famotidine   • Hiatus hernia syndrome    • History of pancreatitis    • Indigestion     taking famotidine   • Kidney disease    • Pain     everywhere   • Pneumonia 10/2019   • Psychiatric problem     anxiety and depression   • Rheumatoid arthritis(714.0) 2003   • Substance abuse (HCC)        PSHx:   Past Surgical History:   Procedure Laterality Date   • [place] ENDOSCOPIC US EXAM, ESOPH  4/29/2020    Procedure: EGD, WITH ENDOSCOPIC US;  Surgeon: Jorge Brooke M.D.;  Location: [facility];  Service: Gastroenterology   • GASTROSCOPY-ENDO  4/29/2020    Procedure: GASTROSCOPY;  Surgeon: Jorge Brooke M.D.;  Location: [facility];  Service: Gastroenterology   • EGD WITH ASP/BX  4/29/2020    Procedure: EGD, WITH ASPIRATION BIOPSY - POSS FNA;  Surgeon: Jorge Brooke M.D.;  Location: [facility];  Service: Gastroenterology   • [place] EXPLORATORY OF ABDOMEN N/A 10/4/2019    Procedure: LAPAROTOMY, EXPLORATORY AND REPAIR OF DUODENAL PERFORATION;  Surgeon: James Dumont M.D.;  Location: [facility];  Service: General   • COLONOSCOPY  2018    with upper endoscopy   • FINGER ARTHROPLASTY Right 6/5/2017    Procedure: FINGER ARTHROPLASTY - LONG, RING AND SMALL VOLAR PLATE;  Surgeon: Lobo Rosen M.D.;  Location: SURGERY SAME DAY [facility];  Service:    • FINGER AMPUTATION  6/5/2017    Procedure: FINGER AMPUTATION - LONG, RING AND SMALL AT THE PROXIMAL INTERPHALANGEAL JOINT;  Surgeon: Lobo Rosen M.D.;  Location: SURGERY SAME DAY [facility];  Service:    • FINGER ARTHROPLASTY Right 4/17/2017    Procedure: FINGER  ARTHROPLASTY ;  Surgeon: Lobo Rosen M.D.;  Location: SURGERY SAME DAY [facility];  Service:    • FINGER AMPUTATION Right 9/14/2016    Procedure: FINGER AMPUTATION INDEX;  Surgeon: Lobo Rosen M.D.;  Location: SURGERY SAME DAY [facility];  Service:    • IRRIGATION & DEBRIDEMENT ORTHO Right 9/11/2016    Procedure: IRRIGATION & DEBRIDEMENT ORTHO - right index finger;  Surgeon: Madhu Rosen M.D.;  Location: SURGERY [facility];  Service:    • FUSION, PIP JOINT, TOE Right 8/29/2016    Procedure: RE-DO INDEX FINGER PROXIMAL INTERPHALANGEAL ARTHRODESIS;  Surgeon: Lobo Rosen M.D.;  Location: SURGERY SAME DAY [facility];  Service:    • BONE GRAFT Right 8/29/2016    Procedure: BONE GRAFT - DISTAL RADIUS ;  Surgeon: Lobo Rosen M.D.;  Location: SURGERY SAME DAY [facility];  Service:    • ARTHRODESIS Right 5/6/2016    Procedure: ARTHRODESIS INDEX FINGER PROXIMAL INTERPHALANGEAL;  Surgeon: Lobo Rosen M.D.;  Location: SURGERY SAME DAY [facility];  Service:    • FOOT RECONSTRUCTION RHEUMATIC Left 7/29/2015    Procedure: FOOT RECONSTRUCTION RHEUMATIC;  Surgeon: Heriberto Alves M.D.;  Location: [facility];  Service:    • TOE FUSION Right 5/27/2015    Procedure: TOE FUSION 1ST METATARSALPHALANGEAL JOINT;  Surgeon: Heriberto Alves M.D.;  Location: [facility];  Service:    • BONE SPUR EXCISION  5/27/2015    Procedure: BONE SPUR EXCISION METATARSAL HEAD 2-3;  Surgeon: Heriberto Alves M.D.;  Location: [facility];  Service:    • HAMMERTOE CORRECTION  5/27/2015    Procedure: HAMMERTOE CORRECTION AND SOFT TISSUE RE-ALINGMENT 2-3 ;  Surgeon: Heriebrto Alves M.D.;  Location: [facility];  Service:    • DENTAL EXTRACTION(S)  2014    all of upper teeth   • ABDOMINAL ABSCESS DRAINAGE  9/27/2011    Performed by VERO WRIGHT at [facility]   • EMANUEL BY LAPAROSCOPY  9/27/2011    Performed by VERO WRIGHT  at [facility]   • APPENDECTOMY  2011    Found out it had ruptured prior to abcess surgery   • REPEAT C SECTION  2008   • REPEAT C SECTION  2005   • REPEAT C SECTION  1999   • PRIMARY C SECTION  1991   • TONSILLECTOMY  1982   • WRIST EXPLORATION Left 1980's    fractured wrist-no hardware       Family history   Family History   Problem Relation Age of Onset   • Cancer Mother    • Heart Disease Mother    • Hypertension Mother    • Heart Disease Father    • Hypertension Father          Medications:   Outpatient Medications Marked as Taking for the 6/1/22 encounter (Office Visit) with Jayy Kerr M.D.   Medication Sig Dispense Refill   • oxyCODONE-acetaminophen (PERCOCET-10)  MG Tab Take 1 Tablet by mouth every four hours as needed for Severe Pain for up to 30 days. 150 Tablet 0   • [START ON 7/1/2022] oxyCODONE-acetaminophen (PERCOCET-10)  MG Tab Take 1 Tablet by mouth every four hours as needed for Severe Pain for up to 30 days. 150 Tablet 0       Allergies:   Allergies   Allergen Reactions   • Penicillins Anaphylaxis and Hives     Tolerates cephalosporins; reports throat swelling with PCN   • Aripiprazole Nausea     Spasms, shaking     • Nitrous Oxide Vomiting       Social Hx:   Social History     Socioeconomic History   • Marital status:      Spouse name: Ousmane   • Number of children: 5   • Years of education: Not on file   • Highest education level: Not on file   Occupational History   • Not on file   Tobacco Use   • Smoking status: Current Every Day Smoker     Packs/day: 1.00     Years: 30.00     Pack years: 30.00     Types: Cigarettes   • Smokeless tobacco: Never Used   Vaping Use   • Vaping Use: Never used   Substance and Sexual Activity   • Alcohol use: Never   • Drug use: Never   • Sexual activity: Not Currently   Other Topics Concern   •  Service No   • Blood Transfusions Yes   • Caffeine Concern No   • Occupational Exposure No   • Hobby Hazards No   • Sleep Concern  "No   • Stress Concern Yes   • Weight Concern No   • Special Diet No   • Back Care No   • Exercise Yes   • Bike Helmet Yes   • Seat Belt Yes   • Self-Exams Yes   Social History Narrative    ** Merged History Encounter **          Social Determinants of Health     Financial Resource Strain: Not on file   Food Insecurity: Not on file   Transportation Needs: Not on file   Physical Activity: Not on file   Stress: Not on file   Social Connections: Not on file   Intimate Partner Violence: Not on file   Housing Stability: Not on file         EXAMINATION     Physical Exam:   Vitals: /62 (BP Location: Right arm, Patient Position: Sitting, BP Cuff Size: Adult long)   Pulse (!) 109   Temp 36.9 °C (98.4 °F) (Temporal)   Ht 1.6 m (5' 3\")   Wt 45.4 kg (100 lb 1.4 oz)   SpO2 98%     Constitutional:   Body Habitus: Body mass index is 17.73 kg/m².  Cooperation: Fully cooperates with exam  Appearance: Appears pale, She is wearing a mask  Respiratory-  breathing comfortable on room air, no audible wheezing  Cardiovascular- no lower extremity edema is observed.     Psychiatric- alert and oriented ×3. Normal affect.     Musculoskeletal:    Partial hand amputation on the right at the MCPs; ulnar deviation on the left with MCP subluxation, mild atrophy of the hand intrinsics with wasting of the thenar eminence.  Limited range of motion of the left hand performing     Gait is steady without assist device.  Mildly antalgic with increased knee flexion, particularly on the right, during gait.      MEDICAL DECISION MAKING    DATA    Labs: UDS 10/30/2018 consistent with medications.  Oxycodone and metabolites without other substances   UDS 04/19/2019 consistent with medications. Oxycodone and metabolites without other substances   UDS 07/19/2019 consistent with medications.  Oxycodone and metabolites without other substances   UDS 11/22/2019 consistent with medications.  Oxycodone and metabolites without other substances   UDS " 02/20/2020 absent for Oxycodone and metabolites without other substances  UDS 06/10/2020 positive for oxycodone and metabolites without other substances  UDS 08/18/2020 positive for oxycodone and metabolites, positive for EtG without EtS  UDS 09/07/2021 negative for oxycodone and metabolites, patient was out of medication, no other abnormalities  UDS 05/18/2022: positive for oxycodone and metabolites.      Imaging:    Films reviewed.  These are my reads:    Xray right shoulder 08/20/2020  There is note of severe degenerative change of the glenohumeral joint.  No fracture.  Erosive changes, consistent with known history of RA    MRI cervical spine 07/31/2018:    There is is note of motion at the atlantodental level with 5mm atlantodental inverval in flexion that reduces to 1-2 mm in extension.  Mild retrolisthesis of C4 on C5 and anterolisthesis of C5- on C6.  Disc extrusion at C6-7 with mild central canal stenosis    Xray left shoulder 2/5/2018 Humeral head is elevated.  Glenohumeral joint arthritis.    Reports reviewed:    Xray right shoulder 08/20/2020 [facility]  Impression  Extensive erosive changes of the humeral head and degenerative changes of the glenohumeral joint.  Findings are concerning for inflammatory arthropathy such as rheumatoid arthritis.      Xray cervical spine 11/14/2018  1. No acute fracture  2. Persistent motion at the C1-2 joint as before, suggesting atlantoaxial instability  3. Minimal instability noted at C5-6 level as described.    MRI cervical spine 07/31/2018  1. Widening of the atlantodental interal in neutral and flexion positions with a 5mm space which reduces to 1-2 mm in extension  2. 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 Rheumatoid arthritis  involving multiple sites with positive rheumatoid factor (HCC)  M05.79   2. Arthritis of glenohumeral joint  M19.019   3. 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 Hand deformity, acquired, left  M21.942   5. Nausea  R11.0   6. Chronic, continuous use of opioids  F11.90         ASSESSMENT and PLAN:     Mary Martinez 52 y.o. female with rheumatoid arthritis    Mary was seen today for follow-up.    Orders and management for this visit:    Orders Placed This Encounter   • ondansetron (ZOFRAN ODT) 4 MG TABLET DISPERSIBLE   • oxyCODONE-acetaminophen (PERCOCET-10)  MG Tab   • oxyCODONE-acetaminophen (PERCOCET-10)  MG Tab   • Consent for Opiate Prescription   • Controlled Substance Treatment Agreement     1.  Encouraged her to continue to follow-up with GI and other medical specialties including Dr. Dela Cruz for rheumatology.  PCP Dr. Christopher.  2.  I did not order Zofran during this visit and no prescription was sent to the pharmacy.  Unclear why this is noted as an order other than I removed one of the duplicate medications.  3.  Overall her abdominal pain has been worse than other peripheral joint pains  4.  Reviewed plan for repeat urine drug screen.  Continue with current dose of Percocet 10/325 number 150/month.  We discussed maintaining this current dose.  She continued maintaining regular bowel movements with over-the-counter medications.  Discussed that she reports one of the providers in the ER told her to take another Percocet working to her at times if her pain was worse in the middle of the night.  Unfortunately no one contacted me about this.  We discussed that she should not do this as it will cause her to run out of her medicine sooner than scheduled.  I would like her to update me with how many times in the next week to 2 she has obstructive symptoms and encouraged her to seek emergency care if she is throwing up clots of blood or has ongoing emesis  5.  Discussed that she has ongoing  depression but is working with counseling and  Psychiatry.  She denies suicidality.        In prescribing controlled substances to this patient, I certify that I have obtained and reviewed the medical history of Mary Martinez. I have also made a good aristides effort to obtain applicable records from other providers who have treated the patient and records did not demonstrate any increased risk of substance abuse that would prevent me from prescribing controlled substances.     I have conducted a physical exam and documented it. I have reviewed Ms. Martinez’s prescription history as maintained by the [place] Prescription Monitoring Program.   ORT 4, indicates moderate risk    I have assessed the patient’s risk for abuse, dependency, and addiction using the validated Opioid Risk Tool available at https://www.mdcalc.com/gnyvxf-qwha-ayyh-ort-narcotic-abuse.     Given the above, I believe the benefits of controlled substance therapy outweigh the risks. The reasons for prescribing controlled substances include non-narcotic, oral analgesic alternatives have been inadequate for pain control and in my professional opinion, controlled substances are the only reasonable choice for this patient because her pain was note adequately controlled with alternatives and she has chronic progressive disease. Accordingly, I have discussed the risk and benefits, treatment plan, and alternative therapies with the patient.       Follow up: 2 months, prn    Please note that this dictation was created using voice recognition software. I have made every reasonable attempt to correct obvious errors but there may be errors of grammar and content that I may have overlooked prior to finalization of this note.      Jayy Kerr MD  Interventional Spine and Sports Physiatry  Physical Medicine and Rehabilitation  [facility]      PCP: Dr. Fidencio Christopher/[facility]

## 2022-06-03 ENCOUNTER — APPOINTMENT (OUTPATIENT)
Dept: PHYSICAL MEDICINE AND REHAB | Facility: MEDICAL CENTER | Age: 52
End: 2022-06-03
Payer: MEDICAID

## 2022-06-20 ENCOUNTER — APPOINTMENT (OUTPATIENT)
Dept: RADIOLOGY | Facility: MEDICAL CENTER | Age: 52
End: 2022-06-20
Attending: EMERGENCY MEDICINE
Payer: MEDICAID

## 2022-06-20 ENCOUNTER — HOSPITAL ENCOUNTER (EMERGENCY)
Facility: MEDICAL CENTER | Age: 52
End: 2022-06-20
Attending: EMERGENCY MEDICINE
Payer: MEDICAID

## 2022-06-20 VITALS
TEMPERATURE: 97.1 F | OXYGEN SATURATION: 94 % | RESPIRATION RATE: 14 BRPM | HEART RATE: 81 BPM | BODY MASS INDEX: 17.72 KG/M2 | HEIGHT: 63 IN | DIASTOLIC BLOOD PRESSURE: 63 MMHG | WEIGHT: 100 LBS | SYSTOLIC BLOOD PRESSURE: 96 MMHG

## 2022-06-20 DIAGNOSIS — R11.2 NAUSEA AND VOMITING, INTRACTABILITY OF VOMITING NOT SPECIFIED, UNSPECIFIED VOMITING TYPE: ICD-10-CM

## 2022-06-20 DIAGNOSIS — R10.13 EPIGASTRIC PAIN: ICD-10-CM

## 2022-06-20 LAB
ALBUMIN SERPL BCP-MCNC: 4.1 G/DL (ref 3.2–4.9)
ALBUMIN/GLOB SERPL: 1 G/DL
ALP SERPL-CCNC: 161 U/L (ref 30–99)
ALT SERPL-CCNC: 5 U/L (ref 2–50)
ANION GAP SERPL CALC-SCNC: 15 MMOL/L (ref 7–16)
APPEARANCE UR: CLEAR
AST SERPL-CCNC: 16 U/L (ref 12–45)
BASOPHILS # BLD AUTO: 0.7 % (ref 0–1.8)
BASOPHILS # BLD: 0.06 K/UL (ref 0–0.12)
BILIRUB SERPL-MCNC: 0.3 MG/DL (ref 0.1–1.5)
BILIRUB UR QL STRIP.AUTO: NEGATIVE
BUN SERPL-MCNC: 6 MG/DL (ref 8–22)
CALCIUM SERPL-MCNC: 9.6 MG/DL (ref 8.5–10.5)
CHLORIDE SERPL-SCNC: 105 MMOL/L (ref 96–112)
CO2 SERPL-SCNC: 18 MMOL/L (ref 20–33)
COLOR UR: YELLOW
CREAT SERPL-MCNC: 0.71 MG/DL (ref 0.5–1.4)
EKG IMPRESSION: NORMAL
EOSINOPHIL # BLD AUTO: 0.18 K/UL (ref 0–0.51)
EOSINOPHIL NFR BLD: 2.1 % (ref 0–6.9)
ERYTHROCYTE [DISTWIDTH] IN BLOOD BY AUTOMATED COUNT: 52.9 FL (ref 35.9–50)
GFR SERPLBLD CREATININE-BSD FMLA CKD-EPI: 103 ML/MIN/1.73 M 2
GLOBULIN SER CALC-MCNC: 4.1 G/DL (ref 1.9–3.5)
GLUCOSE SERPL-MCNC: 111 MG/DL (ref 65–99)
GLUCOSE UR STRIP.AUTO-MCNC: NEGATIVE MG/DL
HCG SERPL QL: NEGATIVE
HCT VFR BLD AUTO: 41.8 % (ref 37–47)
HGB BLD-MCNC: 14 G/DL (ref 12–16)
IMM GRANULOCYTES # BLD AUTO: 0.03 K/UL (ref 0–0.11)
IMM GRANULOCYTES NFR BLD AUTO: 0.4 % (ref 0–0.9)
KETONES UR STRIP.AUTO-MCNC: NEGATIVE MG/DL
LEUKOCYTE ESTERASE UR QL STRIP.AUTO: NEGATIVE
LIPASE SERPL-CCNC: 44 U/L (ref 11–82)
LYMPHOCYTES # BLD AUTO: 1.69 K/UL (ref 1–4.8)
LYMPHOCYTES NFR BLD: 19.8 % (ref 22–41)
MCH RBC QN AUTO: 32.8 PG (ref 27–33)
MCHC RBC AUTO-ENTMCNC: 33.5 G/DL (ref 33.6–35)
MCV RBC AUTO: 97.9 FL (ref 81.4–97.8)
MICRO URNS: NORMAL
MONOCYTES # BLD AUTO: 0.58 K/UL (ref 0–0.85)
MONOCYTES NFR BLD AUTO: 6.8 % (ref 0–13.4)
NEUTROPHILS # BLD AUTO: 6.01 K/UL (ref 2–7.15)
NEUTROPHILS NFR BLD: 70.2 % (ref 44–72)
NITRITE UR QL STRIP.AUTO: NEGATIVE
NRBC # BLD AUTO: 0 K/UL
NRBC BLD-RTO: 0 /100 WBC
PH UR STRIP.AUTO: 7 [PH] (ref 5–8)
PLATELET # BLD AUTO: 470 K/UL (ref 164–446)
PMV BLD AUTO: 9.9 FL (ref 9–12.9)
POTASSIUM SERPL-SCNC: 4 MMOL/L (ref 3.6–5.5)
PROT SERPL-MCNC: 8.2 G/DL (ref 6–8.2)
PROT UR QL STRIP: NEGATIVE MG/DL
RBC # BLD AUTO: 4.27 M/UL (ref 4.2–5.4)
RBC UR QL AUTO: NEGATIVE
SODIUM SERPL-SCNC: 138 MMOL/L (ref 135–145)
SP GR UR STRIP.AUTO: 1.01
UROBILINOGEN UR STRIP.AUTO-MCNC: 0.2 MG/DL
WBC # BLD AUTO: 8.6 K/UL (ref 4.8–10.8)

## 2022-06-20 PROCEDURE — 96374 THER/PROPH/DIAG INJ IV PUSH: CPT

## 2022-06-20 PROCEDURE — 99285 EMERGENCY DEPT VISIT HI MDM: CPT

## 2022-06-20 PROCEDURE — 700111 HCHG RX REV CODE 636 W/ 250 OVERRIDE (IP): Performed by: EMERGENCY MEDICINE

## 2022-06-20 PROCEDURE — 700102 HCHG RX REV CODE 250 W/ 637 OVERRIDE(OP): Performed by: EMERGENCY MEDICINE

## 2022-06-20 PROCEDURE — 80053 COMPREHEN METABOLIC PANEL: CPT

## 2022-06-20 PROCEDURE — 81003 URINALYSIS AUTO W/O SCOPE: CPT

## 2022-06-20 PROCEDURE — 74176 CT ABD & PELVIS W/O CONTRAST: CPT

## 2022-06-20 PROCEDURE — 85025 COMPLETE CBC W/AUTO DIFF WBC: CPT

## 2022-06-20 PROCEDURE — 96375 TX/PRO/DX INJ NEW DRUG ADDON: CPT

## 2022-06-20 PROCEDURE — A9270 NON-COVERED ITEM OR SERVICE: HCPCS | Performed by: EMERGENCY MEDICINE

## 2022-06-20 PROCEDURE — 83690 ASSAY OF LIPASE: CPT

## 2022-06-20 PROCEDURE — 84703 CHORIONIC GONADOTROPIN ASSAY: CPT

## 2022-06-20 PROCEDURE — 93005 ELECTROCARDIOGRAM TRACING: CPT

## 2022-06-20 RX ORDER — HYDROCODONE BITARTRATE AND ACETAMINOPHEN 5; 325 MG/1; MG/1
1 TABLET ORAL ONCE
Status: COMPLETED | OUTPATIENT
Start: 2022-06-20 | End: 2022-06-20

## 2022-06-20 RX ORDER — MORPHINE SULFATE 4 MG/ML
4 INJECTION INTRAVENOUS ONCE
Status: COMPLETED | OUTPATIENT
Start: 2022-06-20 | End: 2022-06-20

## 2022-06-20 RX ORDER — ONDANSETRON 2 MG/ML
4 INJECTION INTRAMUSCULAR; INTRAVENOUS ONCE
Status: COMPLETED | OUTPATIENT
Start: 2022-06-20 | End: 2022-06-20

## 2022-06-20 RX ORDER — PROMETHAZINE HYDROCHLORIDE 25 MG/1
25 SUPPOSITORY RECTAL EVERY 6 HOURS PRN
Qty: 10 SUPPOSITORY | Refills: 0 | Status: SHIPPED | OUTPATIENT
Start: 2022-06-20 | End: 2022-08-31

## 2022-06-20 RX ADMIN — MORPHINE SULFATE 4 MG: 4 INJECTION INTRAVENOUS at 10:24

## 2022-06-20 RX ADMIN — LIDOCAINE HYDROCHLORIDE 30 ML: 20 SOLUTION OROPHARYNGEAL at 11:51

## 2022-06-20 RX ADMIN — HYDROCODONE BITARTRATE AND ACETAMINOPHEN 1 TABLET: 5; 325 TABLET ORAL at 14:48

## 2022-06-20 RX ADMIN — ONDANSETRON HYDROCHLORIDE 4 MG: 2 SOLUTION INTRAMUSCULAR; INTRAVENOUS at 10:24

## 2022-06-20 ASSESSMENT — PAIN DESCRIPTION - PAIN TYPE: TYPE: ACUTE PAIN

## 2022-06-20 ASSESSMENT — FIBROSIS 4 INDEX: FIB4 SCORE: 0.55

## 2022-06-20 ASSESSMENT — ENCOUNTER SYMPTOMS
BLOOD IN STOOL: 0
FEVER: 0
ABDOMINAL PAIN: 1
CHILLS: 0
VOMITING: 1
NAUSEA: 1

## 2022-06-20 NOTE — ED NOTES
Pt resting in [facility], VSS, NAD. Pt updated on POC and has no further requests at this time

## 2022-06-20 NOTE — ED TRIAGE NOTES
"Chief Complaint   Patient presents with   • Abdominal Pain     Pt reports RUQ and LUQ pain increased in severity   Pt reports GI sent her to be admitted for endoscopy    • Epigastric Pain     Since Wednesday        BIB [facility] for above complaint. Pt reports long hx with GI and that she is supposed to have an endoscopy in the next few months but that her GI physician sent her here to have it done today d/t increasing severity in abd pain.     /73   Pulse 66   Temp 36.2 °C (97.2 °F) (Temporal)   Resp 16   Ht 1.6 m (5' 3\")   Wt 45.4 kg (100 lb)   LMP 09/21/2011   SpO2 96%   BMI 17.71 kg/m²     "

## 2022-06-20 NOTE — ED NOTES
Pt medicated per MAR. PIV d/c. Pt provided d/c instructions and verbalizes understanding with no further questions. Pt ambulated with family member to lobby for d/c

## 2022-06-20 NOTE — ED PROVIDER NOTES
ED Provider Note    Scribed for Marcio Figueroa M.D. by Joy Carpenter. 6/20/2022, 9:30 AM.    Primary care provider: Fidencio Christopher D.O.  Means of arrival: EMS  History obtained from: Patient  History limited by: None    CHIEF COMPLAINT  Chief Complaint   Patient presents with   • Abdominal Pain     Pt reports RUQ and LUQ pain increased in severity   Pt reports GI sent her to be admitted for endoscopy    • Epigastric Pain     Since Wednesday        HPI  Mary Martinez is a 52 y.o. female who presents to the Emergency Department for severe epigastric pain onset 5 days ago. She is additionally experiencing nausea and hematemesis. She reports a small amount of blood in her vomit. She reports having intermittent epigastric pain since 2/2022. She has since been established with [facility]. She spoke with someone at [facility] this morning who instructed her to present to the ED for admission for endoscopy. She has an outpatient endoscopy scheduled but it is a few weeks out. She denies fevers, chills, hematochezia, and melena. She denies heavy ibuprofen use. She has a history of rheumatoid arthritis and ruptured stomach. She is allergic to penicillin. She is a daily smoker.  Is taking any NSAIDs.  Denies any other acute concerns or complaints.    REVIEW OF SYSTEMS  Review of Systems   Constitutional: Negative for chills and fever.   Gastrointestinal: Positive for abdominal pain (epigastric), nausea and vomiting (hematemesis). Negative for blood in stool and melena.   All other systems reviewed and are negative.      PAST MEDICAL HISTORY   has a past medical history of Allergy, Anemia, Anxiety, Arthritis, ASTHMA, Blood transfusion without reported diagnosis, Bowel habit changes, Bronchitis (last approx 2018), Chronic pain (04/24/2020), Dental disorder, Depression, GERD (gastroesophageal reflux disease), Head ache, Heart burn, Hiatus hernia syndrome, History of pancreatitis, Indigestion, Kidney disease,  Pain, Pneumonia (10/2019), Psychiatric problem, Rheumatoid arthritis(714.0) (2003), and Substance abuse (HCC).    SURGICAL HISTORY   has a past surgical history that includes abdominal abscess drainage (9/27/2011); toe fusion (Right, 5/27/2015); bone spur excision (5/27/2015); hammertoe correction (5/27/2015); foot reconstruction rheumatic (Left, 7/29/2015); arthrodesis (Right, 5/6/2016); fusion, pip joint, toe (Right, 8/29/2016); bone graft (Right, 8/29/2016); irrigation & debridement ortho (Right, 9/11/2016); finger amputation (Right, 9/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    SOCIAL HISTORY  Social History     Tobacco Use   • Smoking status: Current Every Day Smoker     Packs/day: 1.00     Years: 30.00     Pack years: 30.00     Types: Cigarettes   • Smokeless tobacco: Never Used   Vaping Use   • Vaping Use: Never used   Substance Use Topics   • Alcohol use: Never   • Drug use: Never      Social History     Substance and Sexual Activity   Drug Use Never       FAMILY HISTORY  Family History   Problem Relation Age of Onset   • Cancer Mother    • Heart Disease Mother    • Hypertension Mother    • Heart Disease Father    • Hypertension Father        CURRENT MEDICATIONS  Home Medications     Reviewed by Gauri Izaguirre R.N. (Registered Nurse) on 06/20/22 at 0927  Med List Status: Not Addressed   Medication Last Dose Status   albuterol (VENTOLIN OR PROVENTIL) 108 (90 BASE) MCG/ACT AERS inhalation aerosol  Active   ENBREL 50 MG/ML Solution  "Prefilled Syringe  Active   Naloxone (NARCAN) 4 MG/0.1ML Liquid  Active   ondansetron (ZOFRAN ODT) 4 MG TABLET DISPERSIBLE  Active   ondansetron (ZOFRAN ODT) 8 MG TABLET DISPERSIBLE  Active   oxyCODONE-acetaminophen (PERCOCET-10)  MG Tab  Active   oxyCODONE-acetaminophen (PERCOCET-10)  MG Tab  Active   pantoprazole (PROTONIX) 40 MG Tablet Delayed Response  Active   PARoxetine (PAXIL) 30 MG Tab  Active   promethazine (PROMETHEGAN) 25 MG Suppos  Active   QUEtiapine (SEROQUEL) 100 MG Tab  Active   QUEtiapine (SEROQUEL) 25 MG Tab  Active   sucralfate (CARAFATE) 1 GM Tab  Active                ALLERGIES  Allergies   Allergen Reactions   • Penicillins Anaphylaxis and Hives     Tolerates cephalosporins; reports throat swelling with PCN   • Aripiprazole Nausea     Spasms, shaking     • Nitrous Oxide Vomiting       PHYSICAL EXAM  VITAL SIGNS: /73   Pulse 66   Temp 36.2 °C (97.2 °F) (Temporal)   Resp 16   Ht 1.6 m (5' 3\")   Wt 45.4 kg (100 lb)   LMP 09/21/2011   SpO2 96%   BMI 17.71 kg/m²   Vitals reviewed.  Constitutional: Awake no acute distress  HENT: Normocephalic, Atraumatic, Bilateral external ears normal, Oropharynx moist, No oral exudates, Nose normal.   Eyes: PERRL, EOMI, Conjunctiva normal, No discharge.   Neck: Normal range of motion, No tenderness, Supple, No stridor.   Cardiovascular: Normal heart rate, Normal rhythm, No murmurs, No rubs, No gallops.   Thorax & Lungs: Normal breath sounds, No respiratory distress, No wheezing, No chest tenderness.   Abdomen: Bowel sounds normal, Soft, Tenderness to palpation of the epigastric region.   Skin: Warm, Dry, No erythema, No rash.   Back: No tenderness, No CVA tenderness.   Musculoskeletal: Good range of motion in all major joints.  Neurologic: Alert, No focal deficits noted.   Psychiatric: Affect normal    LABS  Results for orders placed or performed during the hospital encounter of 06/20/22   CBC WITH DIFFERENTIAL   Result Value Ref Range    " WBC 8.6 4.8 - 10.8 K/uL    RBC 4.27 4.20 - 5.40 M/uL    Hemoglobin 14.0 12.0 - 16.0 g/dL    Hematocrit 41.8 37.0 - 47.0 %    MCV 97.9 (H) 81.4 - 97.8 fL    MCH 32.8 27.0 - 33.0 pg    MCHC 33.5 (L) 33.6 - 35.0 g/dL    RDW 52.9 (H) 35.9 - 50.0 fL    Platelet Count 470 (H) 164 - 446 K/uL    MPV 9.9 9.0 - 12.9 fL    Neutrophils-Polys 70.20 44.00 - 72.00 %    Lymphocytes 19.80 (L) 22.00 - 41.00 %    Monocytes 6.80 0.00 - 13.40 %    Eosinophils 2.10 0.00 - 6.90 %    Basophils 0.70 0.00 - 1.80 %    Immature Granulocytes 0.40 0.00 - 0.90 %    Nucleated RBC 0.00 /100 WBC    Neutrophils (Absolute) 6.01 2.00 - 7.15 K/uL    Lymphs (Absolute) 1.69 1.00 - 4.80 K/uL    Monos (Absolute) 0.58 0.00 - 0.85 K/uL    Eos (Absolute) 0.18 0.00 - 0.51 K/uL    Baso (Absolute) 0.06 0.00 - 0.12 K/uL    Immature Granulocytes (abs) 0.03 0.00 - 0.11 K/uL    NRBC (Absolute) 0.00 K/uL   COMP METABOLIC PANEL   Result Value Ref Range    Sodium 138 135 - 145 mmol/L    Potassium 4.0 3.6 - 5.5 mmol/CL    Chloride 105 96 - 112 mmol/CL    Co 2 18 (CL) 20 - 33 mmol/CL    Anion Gap 15.0 7.0 - 16.0    Glucose 111 (H) 65 - 99 mg/dL    Bun 6 (CL) 8 - 22 mg/dL    Creatinine 0.71 0.50 - 1.40 mg/dL    Calcium 9.6 8.5 - 10.5 mg/dL    AST(SGOT) 16 12 - 45 U/CL    ALT(SGPT) 5 2 - 50 U/CL    Alkaline Phosphatase 161 (H) 30 - 99 U/CL    Total Bilirubin 0.3 0.1 -.5 mg/dL    Albumin 4.1 3.2 - 4.9 G/dL    Total Protein 8.2 6.0 - 8.2 G/dL    Globulin 4.1 (H) 1.9 - 3.5 G/dL    A-G Ratio 1.0 G/dL   LIPASE   Result Value Ref Range    Lipase 44 11 - 82 U/CL   HCG QU AL SERUM   Result Value Ref Range    Beta-HCG Qualitative Serum Negative Negative   URINALYSIS,CULTURE IF INDICATED    Specimen: Urine   Result Value Ref Range    Color Yellow     Character Clear     Specific Gravity 1.013 <1.035    Ph 7.0 5.0 - 8.0    Glucose Negative Negative mg/dL    Ketones Negative Negative mg/dL    Protein Negative Negative mg/dL    Bilirubin Negative Negative    Urobilinogen, Urine 0.2  Negative    Nitrite Negative Negative    Leukocyte Esterase Negative Negative    Occult Blood Negative Negative    Micro Urine Eq see below    ESTIMATED GFR   Result Value Ref Range    GFR (CCK-EPI) 103 >60 mL/min/1.73 am 2       All labs reviewed by me.    EKG Interpretation  Interpreted by me    Results for orders placed or performed during the hospital encounter of 22   EKG   Result Value Ref Range    Report       [facility] Emergency Dept.    Test Date:  2022  Pt Name:    STEFFANIE ELLISON                Department: ER  MRN:        [id number]                      Room:        14  Gender:     Female                       Technician: PETRA AHN  :        1970                   Requested By:ER TRIAGE PROTOCOL  Order #:    [id number]                    Reading MD: DAMI SAWANT. [facility]    Measurements  Intervals                                Axis  Rate:       65                           P:          74  NH:         176                          QRS:        80  QRSD:       92                           T:          49  QT:         392  QTc:        408    Interpretive Statements  SINUS ARRHYTHMIA, RATE  54- 75  LOW VOLTAGE THROUGHOUT  BORDERLINE R WAVE PROGRESSION, ANTERIOR LEADS  BASELINE WANDER IN LEAD(S) V5  Compared to ECG 2021 19:01:46  Sinus tachycardia no longer present  Atrial abnormality no longer present  Electronically Signed On 2022 14:21:00 PDT by DAMI SAWANT. [facility]         RADIOLOGY  CT-ABDOMEN-PELVIS W/O   Final Result      1.  Bilateral nonobstructive renal stones, the largest on the left measuring 5 mm. No evidence of hydronephrosis or hydroureter.   2.  Status post cholecystectomy.   3.  Mild atherosclerosis.        The radiologist's interpretation of all radiological studies have been reviewed by me.    COURSE & MEDICAL DECISION MAKING  Pertinent Labs & Imaging studies reviewed. (See chart for details)    Chart is reviewed for baseline labs and previous  work-up for comparison.  Including imaging, hospitalization records and labs. She had an endoscopy performed in 4/2020. She had a CT performed on 5/23/22 which showed kidney stones. She has a history of perforated viscous. Her labs are unchanged from previous.    Returns return based on labs and previous work-up for comparison.  She has a history of a perforated stomach.  She has previous hospitalizations and ED visits for similar type pain.    9:30 AM - Patient seen and examined at bedside. The patient presents with epigastric pain, nausea, and hematemesis, and the differential diagnosis includes but is not limited to chronic pain, gastritis, perforated ulcer, peptic ulcer disease, and pancreatitis. Ordered for CBC with diff, CMP, Lipase, HCG Qualitative Serum, Urinalysis, and EKG to evaluate. Patient will be treated with morphine 4 mg and Zofran 4 mg for her symptoms.       11:17 AM - Ordered CT-Abdomen-Pelvis to evaluate.     11:39 AM - Paged GI.     11:46 AM - I discussed the patient's case and the above findings with Dr. Foster (GI) who does not feel patient requires hospitalist for endoscopy.  She will see her in follow-up will arrange for expeditious follow-up as an outpatient.  We will also arrange a more expeditious endoscopy.    11:47 AM - Treated patient with GI Cocktail 30 ml.     1:10 PM - Patient was reevaluated at bedside. Discussed lab and radiology results with the patient and informed them about the plan for discharge after urinalysis if it is unremarkable. Patient verbalizes understanding and agreement to this plan of care.     2:43 PM - Treated patient with Norco 5-325 mg prior to discharge.  Patient is feeling better.  She is tolerating fluids.  Repeat exam is diffusely tender without peritonitis.  At this point I do not feel she requires hospitalization.  I do not think this is cardiac in nature she has no signs of acute surgical process.  The patient CBC is reassuring.  Metabolic panel shows  slightly low CO2 as well as elevated alk phos which has had this in the past.  CT does not show any acute changes.    At this point the patient looks well he can be discharged home.  She is comfortable with her home pain medicine she is asking for some additional rectal Phenergan.  She will return for worsening pain, she has fever, vomiting or other concerns.    At this point I think it is reasonable discharged home with close follow-up.  She has a good medical therapy at home with the medicine she has been prescribed.  She is comfortable with the plan to return for some pain or other concerns.  Questions are answered she is agreeable to plan and discharged in good condition.    The patient will return for new or worsening symptoms and is stable at the time of discharge.    The patient is referred to a primary physician for blood pressure management, diabetic screening, and for all other preventative health concerns.      DISPOSITION:  Patient will be discharged home in stable condition.    FOLLOW UP:  Fidencio Christopher D.O.  [address] [facility] [address]  [place] [id number] [phone]        GI will reach out to her and follow-up with her as well.    OUTPATIENT MEDICATIONS:  Current Discharge Medication List        FINAL IMPRESSION  1. Epigastric pain    2. Nausea and vomiting, intractability of vomiting not specified, unspecified vomiting type          I, Joy Carpenter (Scribe), am scribing for, and in the presence of, TRAVIS Cabrera.    Electronically signed by: Joy Carpenter (Scribe), 6/20/2022    IMarcio M.D. personally performed the services described in this documentation, as scribed by Joy Carpenter in my presence, and it is both accurate and complete.    The note accurately reflects work and decisions made by me.  Marcio Figueroa M.D.  6/20/2022  3:58 PM

## 2022-06-20 NOTE — ED NOTES
Pt states that she is not able to provide a UA at this time. Pt encouraged to alert staff as soon as she is able

## 2022-06-20 NOTE — DISCHARGE INSTRUCTIONS
Return to the emerged part for worsening pain, fever, vomiting or other concerns.  Medicines as prescribed.  Follow-up with GI.  Drink plenty of fluids.

## 2022-07-01 ENCOUNTER — TELEPHONE (OUTPATIENT)
Dept: PHYSICAL MEDICINE AND REHAB | Facility: MEDICAL CENTER | Age: 52
End: 2022-07-01
Payer: MEDICAID

## 2022-07-01 DIAGNOSIS — F11.90 CHRONIC, CONTINUOUS USE OF OPIOIDS: ICD-10-CM

## 2022-07-01 DIAGNOSIS — M05.79 RHEUMATOID ARTHRITIS INVOLVING MULTIPLE SITES WITH POSITIVE RHEUMATOID FACTOR (HCC): ICD-10-CM

## 2022-07-01 DIAGNOSIS — M19.019 ARTHRITIS OF GLENOHUMERAL JOINT: ICD-10-CM

## 2022-07-01 RX ORDER — OXYCODONE AND ACETAMINOPHEN 10; 325 MG/1; MG/1
1 TABLET ORAL EVERY 4 HOURS PRN
Qty: 25 TABLET | Refills: 0 | Status: SHIPPED | OUTPATIENT
Start: 2022-07-01 | End: 2022-07-06

## 2022-07-01 NOTE — TELEPHONE ENCOUNTER
Patient called and also  [facility] pharmacy send a request for a prior authorization for  Oxycodone Acetaminophen  y sent the PA through [facility] today and I called patient to informed and she started she is out to medication and she is asking if you can send a RX for three or four days and she will pay from her pocket.

## 2022-07-29 ENCOUNTER — OFFICE VISIT (OUTPATIENT)
Dept: PHYSICAL MEDICINE AND REHAB | Facility: MEDICAL CENTER | Age: 52
End: 2022-07-29
Payer: MEDICAID

## 2022-07-29 VITALS
DIASTOLIC BLOOD PRESSURE: 68 MMHG | SYSTOLIC BLOOD PRESSURE: 110 MMHG | TEMPERATURE: 97 F | HEART RATE: 118 BPM | HEIGHT: 63 IN | WEIGHT: 92.8 LBS | OXYGEN SATURATION: 95 % | BODY MASS INDEX: 16.44 KG/M2

## 2022-07-29 DIAGNOSIS — M19.019 ARTHRITIS OF GLENOHUMERAL JOINT: ICD-10-CM

## 2022-07-29 DIAGNOSIS — F32.A DEPRESSION, UNSPECIFIED DEPRESSION TYPE: ICD-10-CM

## 2022-07-29 DIAGNOSIS — F11.90 CHRONIC, CONTINUOUS USE OF OPIOIDS: ICD-10-CM

## 2022-07-29 DIAGNOSIS — M05.79 RHEUMATOID ARTHRITIS INVOLVING MULTIPLE SITES WITH POSITIVE RHEUMATOID FACTOR (HCC): ICD-10-CM

## 2022-07-29 DIAGNOSIS — M21.942 HAND DEFORMITY, ACQUIRED, LEFT: ICD-10-CM

## 2022-07-29 PROCEDURE — 99214 OFFICE O/P EST MOD 30 MIN: CPT | Performed by: PHYSICAL MEDICINE & REHABILITATION

## 2022-07-29 RX ORDER — OXYCODONE AND ACETAMINOPHEN 10; 325 MG/1; MG/1
1 TABLET ORAL EVERY 4 HOURS PRN
Qty: 150 TABLET | Refills: 0 | Status: SHIPPED | OUTPATIENT
Start: 2022-09-04 | End: 2022-09-28 | Stop reason: SDUPTHER

## 2022-07-29 RX ORDER — ONDANSETRON 2 MG/ML
INJECTION INTRAMUSCULAR; INTRAVENOUS
COMMUNITY
Start: 2022-06-20 | End: 2022-08-31

## 2022-07-29 RX ORDER — OXYCODONE AND ACETAMINOPHEN 10; 325 MG/1; MG/1
1 TABLET ORAL EVERY 4 HOURS PRN
Qty: 150 TABLET | Refills: 0 | Status: SHIPPED | OUTPATIENT
Start: 2022-08-05 | End: 2022-09-04

## 2022-07-29 ASSESSMENT — PATIENT HEALTH QUESTIONNAIRE - PHQ9
SUM OF ALL RESPONSES TO PHQ QUESTIONS 1-9: 18
CLINICAL INTERPRETATION OF PHQ2 SCORE: 6
5. POOR APPETITE OR OVEREATING: 3 - NEARLY EVERY DAY

## 2022-07-29 ASSESSMENT — FIBROSIS 4 INDEX: FIB4 SCORE: 0.76

## 2022-07-29 ASSESSMENT — PAIN SCALES - GENERAL: PAINLEVEL: 10=SEVERE PAIN

## 2022-07-29 NOTE — PROGRESS NOTES
Follow up patient note  Pain Medicine, Interventional spine and sports physiatry, Physical medicine rehabilitation    Date of Service:07/29/2022    Chief complaint:   Joint pain and neck pain      HISTORY    Please see new patient note dated 06/08/2018 by Dr Kerr,  for more details.     HPI  Patient identification: Mary Martinez 52 y.o. female returns for follow-up of her pain related to rheumatoid arthritis.      Interval history:     Mary reports that she has had continued abdominal pain and has had endoscopy.  Plans to follow-up regarding biopsy results of August 11.  She reports that her weight has fallen nearly 10 pounds again.  She is having difficulty eating food and has a hard time keeping water down.  She reports that medications have helped with her heartburn.    Her abdominal pain is currently worse than her other musculoskeletal pain although multiple joint pains in the neck shoulder and knee persists.    She has been requiring more assistance in ADLs from her family    Taking percocet 10/325 five a day.  Bowel movements are regular.    Dr. Dela Cruz is her Rheumatologist.    PHQ-9: 18, denies suicidality, continues to work with her psychology and psychiatry team  ORT: 4    ROS  See HPI    Red Flags :   Fever, Chills, Sweats: Denies  Involuntary Weight Loss: Denies  Bowel/Bladder Incontinence: Denies      PMHx:   Past Medical History:   Diagnosis Date   • Allergy    • Anemia    • Anxiety    • Arthritis     Rheumatoid -follows with rheumatologist. Has several fractures due to RA.   • ASTHMA     inhalers prn   • Blood transfusion without reported diagnosis    • Bowel habit changes     4/24/20-constipation and diarrhea.   • Bronchitis last approx 2018   • Chronic pain 04/24/2020    Due to RA   • Dental disorder     no teeth upper-does not wear her denture. Broken teeth on bottom.   • Depression    • GERD (gastroesophageal reflux disease)    • Head ache    • Heart burn     taking famotidine   •  Hiatus hernia syndrome    • History of pancreatitis    • Indigestion     taking famotidine   • Kidney disease    • Pain     everywhere   • Pneumonia 10/2019   • Psychiatric problem     anxiety and depression   • Rheumatoid arthritis(714.0) 2003   • Substance abuse (HCC)        PSHx:   Past Surgical History:   Procedure Laterality Date   • AZ ENDOSCOPIC US EXAM, ESOPH  4/29/2020    Procedure: EGD, WITH ENDOSCOPIC US;  Surgeon: Jorge Brooke M.D.;  Location: [facility];  Service: Gastroenterology   • GASTROSCOPY-ENDO  4/29/2020    Procedure: GASTROSCOPY;  Surgeon: Jorge Brooke M.D.;  Location: [facility];  Service: Gastroenterology   • EGD WITH ASP/BX  4/29/2020    Procedure: EGD, WITH ASPIRATION BIOPSY - POSS FNA;  Surgeon: Jorge Brooke M.D.;  Location: [facility];  Service: Gastroenterology   • AZ EXPLORATORY OF ABDOMEN N/A 10/4/2019    Procedure: LAPAROTOMY, EXPLORATORY AND REPAIR OF DUODENAL PERFORATION;  Surgeon: James Dumont M.D.;  Location: [facility];  Service: General   • COLONOSCOPY  2018    with upper endoscopy   • FINGER ARTHROPLASTY Right 6/5/2017    Procedure: FINGER ARTHROPLASTY - LONG, RING AND SMALL VOLAR PLATE;  Surgeon: Lobo Rosen M.D.;  Location: SURGERY SAME DAY [facility];  Service:    • FINGER AMPUTATION  6/5/2017    Procedure: FINGER AMPUTATION - LONG, RING AND SMALL AT THE PROXIMAL INTERPHALANGEAL JOINT;  Surgeon: Lobo Rosen M.D.;  Location: SURGERY SAME DAY [facility];  Service:    • FINGER ARTHROPLASTY Right 4/17/2017    Procedure: FINGER ARTHROPLASTY ;  Surgeon: Lobo Rosen M.D.;  Location: SURGERY SAME DAY [facility];  Service:    • FINGER AMPUTATION Right 9/14/2016    Procedure: FINGER AMPUTATION INDEX;  Surgeon: Lobo Rosen M.D.;  Location: SURGERY SAME DAY [facility];  Service:    • IRRIGATION & DEBRIDEMENT ORTHO Right 9/11/2016    Procedure: IRRIGATION  & DEBRIDEMENT ORTHO - right index finger;  Surgeon: Madhu Rosen M.D.;  Location: SURGERY [facility];  Service:    • FUSION, PIP JOINT, TOE Right 8/29/2016    Procedure: RE-DO INDEX FINGER PROXIMAL INTERPHALANGEAL ARTHRODESIS;  Surgeon: Lobo Rosen M.D.;  Location: SURGERY SAME DAY [facility];  Service:    • BONE GRAFT Right 8/29/2016    Procedure: BONE GRAFT - DISTAL RADIUS ;  Surgeon: Lobo Rosen M.D.;  Location: SURGERY SAME DAY [facility];  Service:    • ARTHRODESIS Right 5/6/2016    Procedure: ARTHRODESIS INDEX FINGER PROXIMAL INTERPHALANGEAL;  Surgeon: Lobo Rosen M.D.;  Location: SURGERY SAME DAY [facility];  Service:    • FOOT RECONSTRUCTION RHEUMATIC Left 7/29/2015    Procedure: FOOT RECONSTRUCTION RHEUMATIC;  Surgeon: Heriberto Alves M.D.;  Location: SURGERY [facility];  Service:    • TOE FUSION Right 5/27/2015    Procedure: TOE FUSION 1ST METATARSALPHALANGEAL JOINT;  Surgeon: Heriberto Alves M.D.;  Location: SURGERY [facility];  Service:    • BONE SPUR EXCISION  5/27/2015    Procedure: BONE SPUR EXCISION METATARSAL HEAD 2-3;  Surgeon: Heriberto Alves M.D.;  Location: SURGERY [facility];  Service:    • HAMMERTOE CORRECTION  5/27/2015    Procedure: HAMMERTOE CORRECTION AND SOFT TISSUE RE-ALINGMENT 2-3 ;  Surgeon: Heriberto Alves M.D.;  Location: SURGERY [facility];  Service:    • DENTAL EXTRACTION(S)  2014    all of upper teeth   • ABDOMINAL ABSCESS DRAINAGE  9/27/2011    Performed by VERO WRIGHT at [facility]   • EMANUEL BY LAPAROSCOPY  9/27/2011    Performed by VERO WRIGHT at [facility]   • APPENDECTOMY  2011    Found out it had ruptured prior to abcess surgery   • REPEAT C SECTION  2008   • REPEAT C SECTION  2005   • REPEAT C SECTION  1999   • PRIMARY C SECTION  1991   • TONSILLECTOMY  1982   • WRIST EXPLORATION Left 1980's    fractured wrist-no hardware       Family history   Family History   Problem  Relation Age of Onset   • Cancer Mother    • Heart Disease Mother    • Hypertension Mother    • Heart Disease Father    • Hypertension Father          Medications:   Outpatient Medications Marked as Taking for the 7/29/22 encounter (Office Visit) with Jayy Kerr M.D.   Medication Sig Dispense Refill   • [START ON 8/5/2022] oxyCODONE-acetaminophen (PERCOCET-10)  MG Tab Take 1 Tablet by mouth every four hours as needed for Severe Pain for up to 30 days. 150 Tablet 0   • [START ON 9/4/2022] oxyCODONE-acetaminophen (PERCOCET-10)  MG Tab Take 1 Tablet by mouth every four hours as needed for Severe Pain for up to 30 days. 150 Tablet 0   • promethazine (PROMETHEGAN) 25 MG Suppos Insert 1 Suppository into the rectum every 6 hours as needed for Nausea/Vomiting. 10 Suppository 0   • ondansetron (ZOFRAN ODT) 4 MG TABLET DISPERSIBLE ALLOW 1 TABLET TO DISSOLVE ON THE TONGUE EVERY 8 HOURS AS NEEDED FOR NAUSEA     • pantoprazole (PROTONIX) 40 MG Tablet Delayed Response Take 1 Tablet by mouth every day. 30 Tablet 0   • sucralfate (CARAFATE) 1 GM Tab Take 1 Tablet by mouth 4 Times a Day,Before Meals and at Bedtime. 120 Tablet 3   • QUEtiapine (SEROQUEL) 25 MG Tab Take 25 mg by mouth 2 times a day as needed for Anxiety.     • ENBREL 50 MG/ML Solution Prefilled Syringe INJECT 50 MG ONCE WEEKLY UNDERNEATH THE SKIN  FOR 28 DAYS     • Naloxone (NARCAN) 4 MG/0.1ML Liquid One spray in one nostril for overdose and call 911. 1 Package 0   • ondansetron (ZOFRAN ODT) 8 MG TABLET DISPERSIBLE Take 8 mg by mouth every 8 hours as needed.     • QUEtiapine (SEROQUEL) 100 MG Tab Take 100 mg by mouth every evening.     • PARoxetine (PAXIL) 30 MG Tab Take 60 mg by mouth every evening.     • albuterol (VENTOLIN OR PROVENTIL) 108 (90 BASE) MCG/ACT AERS inhalation aerosol Inhale 2 Puffs by mouth every four hours as needed for Shortness of Breath.         Allergies:   Allergies   Allergen Reactions   • Penicillins Anaphylaxis and Hives      "Tolerates cephalosporins; reports throat swelling with PCN   • Aripiprazole Nausea     Spasms, shaking     • Nitrous Oxide Vomiting       Social Hx:   Social History     Socioeconomic History   • Marital status:      Spouse name: Ousmane   • Number of children: 5   • Years of education: Not on file   • Highest education level: Not on file   Occupational History   • Not on file   Tobacco Use   • Smoking status: Current Every Day Smoker     Packs/day: 1.00     Years: 30.00     Pack years: 30.00     Types: Cigarettes   • Smokeless tobacco: Never Used   Vaping Use   • Vaping Use: Never used   Substance and Sexual Activity   • Alcohol use: Never   • Drug use: Never   • Sexual activity: Not Currently   Other Topics Concern   •  Service No   • Blood Transfusions Yes   • Caffeine Concern No   • Occupational Exposure No   • Hobby Hazards No   • Sleep Concern No   • Stress Concern Yes   • Weight Concern No   • Special Diet No   • Back Care No   • Exercise Yes   • Bike Helmet Yes   • Seat Belt Yes   • Self-Exams Yes   Social History Narrative    ** Merged History Encounter **          Social Determinants of Health     Financial Resource Strain: Not on file   Food Insecurity: Not on file   Transportation Needs: Not on file   Physical Activity: Not on file   Stress: Not on file   Social Connections: Not on file   Intimate Partner Violence: Not on file   Housing Stability: Not on file         EXAMINATION     Physical Exam:   Vitals: /68 (BP Location: Right arm, Patient Position: Sitting, BP Cuff Size: Adult long)   Pulse (!) 118   Temp 36.1 °C (97 °F) (Temporal)   Ht 1.6 m (5' 3\")   Wt 42.1 kg (92 lb 12.8 oz)   SpO2 95%     Constitutional:   Body Habitus: Body mass index is 16.44 kg/m².  Cooperation: Fully cooperates with exam  Appearance: Appears pale, thin.  She is wearing a mask  Respiratory-  breathing comfortable on room air, no audible wheezing  Cardiovascular- no lower extremity edema is observed.   "   Psychiatric- alert and oriented ×3. Normal affect.     Musculoskeletal:    Partial hand amputation on the right at the MCPs; ulnar deviation on the left with MCP subluxation, mild atrophy of the hand intrinsics with wasting of the thenar eminence.  Limited range of motion of the left hand performing     Gait is steady without assist device.  Mildly antalgic without loss of balance      MEDICAL DECISION MAKING    DATA    Labs: UDS 10/30/2018 consistent with medications.  Oxycodone and metabolites without other substances   UDS 04/19/2019 consistent with medications. Oxycodone and metabolites without other substances   UDS 07/19/2019 consistent with medications.  Oxycodone and metabolites without other substances   UDS 11/22/2019 consistent with medications.  Oxycodone and metabolites without other substances   UDS 02/20/2020 absent for Oxycodone and metabolites without other substances  UDS 06/10/2020 positive for oxycodone and metabolites without other substances  UDS 08/18/2020 positive for oxycodone and metabolites, positive for EtG without EtS  UDS 09/07/2021 negative for oxycodone and metabolites, patient was out of medication, no other abnormalities  UDS 05/18/2022: positive for oxycodone and metabolites.      Imaging:    Films reviewed.  These are my reads:    Xray right shoulder 08/20/2020  There is note of severe degenerative change of the glenohumeral joint.  No fracture.  Erosive changes, consistent with known history of RA    MRI cervical spine 07/31/2018:    There is is note of motion at the atlantodental level with 5mm atlantodental inverval in flexion that reduces to 1-2 mm in extension.  Mild retrolisthesis of C4 on C5 and anterolisthesis of C5- on C6.  Disc extrusion at C6-7 with mild central canal stenosis    Xray left shoulder 2/5/2018 Humeral head is elevated.  Glenohumeral joint arthritis.    Reports reviewed:    Xray right shoulder 08/20/2020 [facility]  Impression  Extensive erosive changes of  the humeral head and degenerative changes of the glenohumeral joint.  Findings are concerning for inflammatory arthropathy such as rheumatoid arthritis.      Xray cervical spine 11/14/2018  1. No acute fracture  2. Persistent motion at the C1-2 joint as before, suggesting atlantoaxial instability  3. Minimal instability noted at C5-6 level as described.    MRI cervical spine 07/31/2018  1. Widening of the atlantodental interal in neutral and flexion positions with a 5mm space which reduces to 1-2 mm in extension  2. 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 Rheumatoid arthritis involving multiple sites with positive rheumatoid factor (HCC)  M05.79   2. Arthritis of glenohumeral joint  M19.019   3. Chronic, continuous use of opioids  F11.90   4. Hand deformity, acquired, left  M21.942   5. Depression, unspecified depression type  F32.A         ASSESSMENT and PLAN:     Mary Martinez 52 y.o. female with rheumatoid arthritis    Mary was seen today for follow-up.    Orders and management for this visit:    Orders Placed This Encounter   • Patient has been identified as having a positive depression screening. Appropriate orders and counseling have been given.   • ondansetron (ZOFRAN) 4 MG/2ML Solution injection   • oxyCODONE-acetaminophen (PERCOCET-10)  MG Tab   • oxyCODONE-acetaminophen (PERCOCET-10)  MG Tab   • Consent for Opiate Prescription   • Controlled Substance Treatment Agreement     1.  We discussed the tachycardia that she has on exam.  She denies any chest pain shortness of breath or palpitations.  Advised that should she develop any of the symptoms that she should seek emergency care.  We discussed that dehydration has been an issue and that the GI  physician has discussed possible parenteral or IV nutrition.  Encouraged her to continue to hydrate as able and seek emergency care if for any progression of symptoms.  2.  Reviewed  and most recent drug screen.  Discussed that her abdominal symptoms started well after she started Percocet but discussed the possibility of cycling this to another medication or medications.  We agreed to hold off until after she discusses the results of the biopsies.  3. Continue with current dose of Percocet 10/325 number 150/month.  Prescription sent for the next 2 months.  We discussed maintaining this current dose.  She continued maintaining regular bowel movements with over-the-counter medications needed.  4.  Discussed that she has ongoing depression but is working with counseling and  Psychiatry.  She denies suicidality.      In prescribing controlled substances to this patient, I certify that I have obtained and reviewed the medical history of Mary Martinez. I have also made a good aristides effort to obtain applicable records from other providers who have treated the patient and records did not demonstrate any increased risk of substance abuse that would prevent me from prescribing controlled substances.     I have conducted a physical exam and documented it. I have reviewed Ms. Martinez’s prescription history as maintained by the [place] Prescription Monitoring Program.   ORT 4, indicates moderate risk    I have assessed the patient’s risk for abuse, dependency, and addiction using the validated Opioid Risk Tool available at https://www.mdcalc.com/wwbmac-abbt-kgrs-ort-narcotic-abuse.     Given the above, I believe the benefits of controlled substance therapy outweigh the risks. The reasons for prescribing controlled substances include non-narcotic, oral analgesic alternatives have been inadequate for pain control and in my professional opinion, controlled substances are the only reasonable choice for this patient  because her pain was note adequately controlled with alternatives and she has chronic progressive disease. Accordingly, I have discussed the risk and benefits, treatment plan, and alternative therapies with the patient.       Follow up: 2 months, prn    Please note that this dictation was created using voice recognition software. I have made every reasonable attempt to correct obvious errors but there may be errors of grammar and content that I may have overlooked prior to finalization of this note.      Jayy Kerr MD  Interventional Spine and Sports Physiatry  Physical Medicine and Rehabilitation  [facility]      PCP: Dr. Fidencio Christopher/[facility]

## 2022-08-02 ENCOUNTER — TELEPHONE (OUTPATIENT)
Dept: PHYSICAL MEDICINE AND REHAB | Facility: MEDICAL CENTER | Age: 52
End: 2022-08-02
Payer: MEDICAID

## 2022-08-02 NOTE — TELEPHONE ENCOUNTER
Caller Name: Lobo Martinez  Call Back Number: [phone]    How would the patient prefer to be contacted with a response: Patient's son Lobo Martinez called and he stated he want a call back from Dr Kerr to discuss treatment plan, and also patient  HR and her weight.  P, [phone]

## 2022-08-03 NOTE — TELEPHONE ENCOUNTER
I discussed plan to have her follow-up with PCP and GI regarding dehydration and weight loss on 08/2

## 2022-08-27 ENCOUNTER — APPOINTMENT (OUTPATIENT)
Dept: RADIOLOGY | Facility: MEDICAL CENTER | Age: 52
DRG: 393 | End: 2022-08-27
Attending: EMERGENCY MEDICINE
Payer: MEDICAID

## 2022-08-27 ENCOUNTER — HOSPITAL ENCOUNTER (INPATIENT)
Facility: MEDICAL CENTER | Age: 52
LOS: 2 days | DRG: 393 | End: 2022-08-29
Attending: EMERGENCY MEDICINE | Admitting: STUDENT IN AN ORGANIZED HEALTH CARE EDUCATION/TRAINING PROGRAM
Payer: MEDICAID

## 2022-08-27 DIAGNOSIS — R64 CACHEXIA (HCC): ICD-10-CM

## 2022-08-27 DIAGNOSIS — K55.1 SUPERIOR MESENTERIC ARTERY SYNDROME (HCC): ICD-10-CM

## 2022-08-27 DIAGNOSIS — R10.9 ACUTE ABDOMINAL PAIN: ICD-10-CM

## 2022-08-27 DIAGNOSIS — Z72.0 TOBACCO ABUSE: ICD-10-CM

## 2022-08-27 DIAGNOSIS — S09.90XA CLOSED HEAD INJURY, INITIAL ENCOUNTER: ICD-10-CM

## 2022-08-27 PROBLEM — R55 SYNCOPE: Status: ACTIVE | Noted: 2022-08-27

## 2022-08-27 PROBLEM — R63.6 UNDERWEIGHT: Status: ACTIVE | Noted: 2022-08-27

## 2022-08-27 PROBLEM — E87.6 HYPOKALEMIA: Status: ACTIVE | Noted: 2022-08-27

## 2022-08-27 PROBLEM — Z71.89 ACP (ADVANCE CARE PLANNING): Status: ACTIVE | Noted: 2022-08-27

## 2022-08-27 PROBLEM — E43 SEVERE PROTEIN-CALORIE MALNUTRITION (HCC): Status: ACTIVE | Noted: 2022-08-27

## 2022-08-27 PROBLEM — R53.1 WEAKNESS: Status: ACTIVE | Noted: 2022-08-27

## 2022-08-27 PROBLEM — I95.9 HYPOTENSION: Status: ACTIVE | Noted: 2022-08-27

## 2022-08-27 LAB
ABO GROUP BLD: NORMAL
ALBUMIN SERPL BCP-MCNC: 3.7 G/DL (ref 3.2–4.9)
ALBUMIN/GLOB SERPL: 0.9 G/DL
ALP SERPL-CCNC: 123 U/L (ref 30–99)
ALT SERPL-CCNC: 7 U/L (ref 2–50)
ANION GAP SERPL CALC-SCNC: 15 MMOL/L (ref 7–16)
APPEARANCE UR: CLEAR
APTT PPP: 29.8 SEC (ref 24.7–36)
AST SERPL-CCNC: 10 U/L (ref 12–45)
BILIRUB SERPL-MCNC: 0.2 MG/DL (ref 0.1–1.5)
BILIRUB UR QL STRIP.AUTO: NEGATIVE
BLD GP AB SCN SERPL QL: NORMAL
BUN SERPL-MCNC: 11 MG/DL (ref 8–22)
CALCIUM SERPL-MCNC: 9.2 MG/DL (ref 8.5–10.5)
CHLORIDE SERPL-SCNC: 107 MMOL/L (ref 96–112)
CO2 SERPL-SCNC: 15 MMOL/L (ref 20–33)
COLOR UR: YELLOW
CREAT SERPL-MCNC: 0.86 MG/DL (ref 0.5–1.4)
ERYTHROCYTE [DISTWIDTH] IN BLOOD BY AUTOMATED COUNT: 48.1 FL (ref 35.9–50)
ETHANOL BLD-MCNC: <10.1 MG/DL
GFR SERPLBLD CREATININE-BSD FMLA CKD-EPI: 82 ML/MIN/1.73 M 2
GLOBULIN SER CALC-MCNC: 4.1 G/DL (ref 1.9–3.5)
GLUCOSE SERPL-MCNC: 106 MG/DL (ref 65–99)
GLUCOSE UR STRIP.AUTO-MCNC: NEGATIVE MG/DL
HCG SERPL QL: NEGATIVE
HCT VFR BLD AUTO: 39.5 % (ref 37–47)
HGB BLD-MCNC: 13.1 G/DL (ref 12–16)
INR PPP: 1.09 (ref 0.87–1.13)
KETONES UR STRIP.AUTO-MCNC: NEGATIVE MG/DL
LACTATE SERPL-SCNC: 0.9 MMOL/L (ref 0.5–2)
LACTATE SERPL-SCNC: 2.8 MMOL/L (ref 0.5–2)
LEUKOCYTE ESTERASE UR QL STRIP.AUTO: NEGATIVE
LIPASE SERPL-CCNC: 44 U/L (ref 11–82)
MCH RBC QN AUTO: 31.2 PG (ref 27–33)
MCHC RBC AUTO-ENTMCNC: 33.2 G/DL (ref 33.6–35)
MCV RBC AUTO: 94 FL (ref 81.4–97.8)
MICRO URNS: NORMAL
NITRITE UR QL STRIP.AUTO: NEGATIVE
PH UR STRIP.AUTO: 7 [PH] (ref 5–8)
PLATELET # BLD AUTO: 553 K/UL (ref 164–446)
PMV BLD AUTO: 9.8 FL (ref 9–12.9)
POTASSIUM SERPL-SCNC: 3.4 MMOL/L (ref 3.6–5.5)
PROT SERPL-MCNC: 7.8 G/DL (ref 6–8.2)
PROT UR QL STRIP: NEGATIVE MG/DL
PROTHROMBIN TIME: 14 SEC (ref 12–14.6)
RBC # BLD AUTO: 4.2 M/UL (ref 4.2–5.4)
RBC UR QL AUTO: NEGATIVE
RH BLD: NORMAL
SODIUM SERPL-SCNC: 137 MMOL/L (ref 135–145)
SP GR UR STRIP.AUTO: 1.02
UROBILINOGEN UR STRIP.AUTO-MCNC: 0.2 MG/DL
WBC # BLD AUTO: 7.3 K/UL (ref 4.8–10.8)

## 2022-08-27 PROCEDURE — 82077 ASSAY SPEC XCP UR&BREATH IA: CPT

## 2022-08-27 PROCEDURE — 36415 COLL VENOUS BLD VENIPUNCTURE: CPT

## 2022-08-27 PROCEDURE — 85027 COMPLETE CBC AUTOMATED: CPT

## 2022-08-27 PROCEDURE — 85610 PROTHROMBIN TIME: CPT

## 2022-08-27 PROCEDURE — 86850 RBC ANTIBODY SCREEN: CPT

## 2022-08-27 PROCEDURE — 83690 ASSAY OF LIPASE: CPT

## 2022-08-27 PROCEDURE — 700111 HCHG RX REV CODE 636 W/ 250 OVERRIDE (IP): Performed by: STUDENT IN AN ORGANIZED HEALTH CARE EDUCATION/TRAINING PROGRAM

## 2022-08-27 PROCEDURE — A9270 NON-COVERED ITEM OR SERVICE: HCPCS | Performed by: STUDENT IN AN ORGANIZED HEALTH CARE EDUCATION/TRAINING PROGRAM

## 2022-08-27 PROCEDURE — 96372 THER/PROPH/DIAG INJ SC/IM: CPT

## 2022-08-27 PROCEDURE — 71045 X-RAY EXAM CHEST 1 VIEW: CPT

## 2022-08-27 PROCEDURE — 76705 ECHO EXAM OF ABDOMEN: CPT

## 2022-08-27 PROCEDURE — 70450 CT HEAD/BRAIN W/O DYE: CPT

## 2022-08-27 PROCEDURE — 96368 THER/DIAG CONCURRENT INF: CPT

## 2022-08-27 PROCEDURE — 99223 1ST HOSP IP/OBS HIGH 75: CPT | Performed by: STUDENT IN AN ORGANIZED HEALTH CARE EDUCATION/TRAINING PROGRAM

## 2022-08-27 PROCEDURE — 700101 HCHG RX REV CODE 250: Performed by: EMERGENCY MEDICINE

## 2022-08-27 PROCEDURE — 700117 HCHG RX CONTRAST REV CODE 255: Performed by: EMERGENCY MEDICINE

## 2022-08-27 PROCEDURE — 87040 BLOOD CULTURE FOR BACTERIA: CPT

## 2022-08-27 PROCEDURE — 700102 HCHG RX REV CODE 250 W/ 637 OVERRIDE(OP): Performed by: STUDENT IN AN ORGANIZED HEALTH CARE EDUCATION/TRAINING PROGRAM

## 2022-08-27 PROCEDURE — 83605 ASSAY OF LACTIC ACID: CPT | Mod: 91

## 2022-08-27 PROCEDURE — 80053 COMPREHEN METABOLIC PANEL: CPT

## 2022-08-27 PROCEDURE — 81003 URINALYSIS AUTO W/O SCOPE: CPT

## 2022-08-27 PROCEDURE — 86900 BLOOD TYPING SEROLOGIC ABO: CPT

## 2022-08-27 PROCEDURE — 93005 ELECTROCARDIOGRAM TRACING: CPT | Performed by: STUDENT IN AN ORGANIZED HEALTH CARE EDUCATION/TRAINING PROGRAM

## 2022-08-27 PROCEDURE — 86901 BLOOD TYPING SEROLOGIC RH(D): CPT

## 2022-08-27 PROCEDURE — 99285 EMERGENCY DEPT VISIT HI MDM: CPT

## 2022-08-27 PROCEDURE — 84703 CHORIONIC GONADOTROPIN ASSAY: CPT

## 2022-08-27 PROCEDURE — 99222 1ST HOSP IP/OBS MODERATE 55: CPT | Performed by: SURGERY

## 2022-08-27 PROCEDURE — 700101 HCHG RX REV CODE 250: Performed by: STUDENT IN AN ORGANIZED HEALTH CARE EDUCATION/TRAINING PROGRAM

## 2022-08-27 PROCEDURE — 72170 X-RAY EXAM OF PELVIS: CPT

## 2022-08-27 PROCEDURE — 700111 HCHG RX REV CODE 636 W/ 250 OVERRIDE (IP): Performed by: EMERGENCY MEDICINE

## 2022-08-27 PROCEDURE — 96375 TX/PRO/DX INJ NEW DRUG ADDON: CPT

## 2022-08-27 PROCEDURE — 770006 HCHG ROOM/CARE - MED/SURG/GYN SEMI*

## 2022-08-27 PROCEDURE — 85730 THROMBOPLASTIN TIME PARTIAL: CPT

## 2022-08-27 PROCEDURE — 74177 CT ABD & PELVIS W/CONTRAST: CPT

## 2022-08-27 PROCEDURE — 305949 HCHG RED TRAUMA ACT PRE-NOTIFY NO CC

## 2022-08-27 PROCEDURE — 700105 HCHG RX REV CODE 258: Performed by: EMERGENCY MEDICINE

## 2022-08-27 PROCEDURE — 700105 HCHG RX REV CODE 258: Performed by: STUDENT IN AN ORGANIZED HEALTH CARE EDUCATION/TRAINING PROGRAM

## 2022-08-27 PROCEDURE — 96365 THER/PROPH/DIAG IV INF INIT: CPT

## 2022-08-27 RX ORDER — PAROXETINE 30 MG/1
60 TABLET, FILM COATED ORAL EVERY EVENING
Status: DISCONTINUED | OUTPATIENT
Start: 2022-08-27 | End: 2022-08-29 | Stop reason: HOSPADM

## 2022-08-27 RX ORDER — BISACODYL 10 MG
10 SUPPOSITORY, RECTAL RECTAL
Status: DISCONTINUED | OUTPATIENT
Start: 2022-08-27 | End: 2022-08-29 | Stop reason: HOSPADM

## 2022-08-27 RX ORDER — MIDODRINE HYDROCHLORIDE 5 MG/1
5 TABLET ORAL
Status: DISCONTINUED | OUTPATIENT
Start: 2022-08-27 | End: 2022-08-28

## 2022-08-27 RX ORDER — NICOTINE 21 MG/24HR
21 PATCH, TRANSDERMAL 24 HOURS TRANSDERMAL
Status: DISCONTINUED | OUTPATIENT
Start: 2022-08-27 | End: 2022-08-29 | Stop reason: HOSPADM

## 2022-08-27 RX ORDER — GUAIFENESIN/DEXTROMETHORPHAN 100-10MG/5
10 SYRUP ORAL EVERY 6 HOURS PRN
Status: DISCONTINUED | OUTPATIENT
Start: 2022-08-27 | End: 2022-08-29 | Stop reason: HOSPADM

## 2022-08-27 RX ORDER — MORPHINE SULFATE 4 MG/ML
4 INJECTION INTRAVENOUS ONCE
Status: COMPLETED | OUTPATIENT
Start: 2022-08-27 | End: 2022-08-27

## 2022-08-27 RX ORDER — QUETIAPINE FUMARATE 100 MG/1
100 TABLET, FILM COATED ORAL NIGHTLY
Status: DISCONTINUED | OUTPATIENT
Start: 2022-08-27 | End: 2022-08-29 | Stop reason: HOSPADM

## 2022-08-27 RX ORDER — PROMETHAZINE HYDROCHLORIDE 25 MG/1
12.5-25 SUPPOSITORY RECTAL EVERY 4 HOURS PRN
Status: DISCONTINUED | OUTPATIENT
Start: 2022-08-27 | End: 2022-08-29 | Stop reason: HOSPADM

## 2022-08-27 RX ORDER — FOLIC ACID 1 MG/1
1 TABLET ORAL 2 TIMES DAILY
Status: DISCONTINUED | OUTPATIENT
Start: 2022-08-27 | End: 2022-08-29 | Stop reason: HOSPADM

## 2022-08-27 RX ORDER — SODIUM CHLORIDE, SODIUM LACTATE, POTASSIUM CHLORIDE, AND CALCIUM CHLORIDE .6; .31; .03; .02 G/100ML; G/100ML; G/100ML; G/100ML
30 INJECTION, SOLUTION INTRAVENOUS ONCE
Status: COMPLETED | OUTPATIENT
Start: 2022-08-27 | End: 2022-08-27

## 2022-08-27 RX ORDER — LABETALOL HYDROCHLORIDE 5 MG/ML
10 INJECTION, SOLUTION INTRAVENOUS EVERY 4 HOURS PRN
Status: DISCONTINUED | OUTPATIENT
Start: 2022-08-27 | End: 2022-08-29 | Stop reason: HOSPADM

## 2022-08-27 RX ORDER — SUCRALFATE 1 G/1
1 TABLET ORAL
COMMUNITY
End: 2022-08-31

## 2022-08-27 RX ORDER — ENOXAPARIN SODIUM 100 MG/ML
30 INJECTION SUBCUTANEOUS DAILY
Status: DISCONTINUED | OUTPATIENT
Start: 2022-08-27 | End: 2022-08-29 | Stop reason: HOSPADM

## 2022-08-27 RX ORDER — ACETAMINOPHEN 325 MG/1
650 TABLET ORAL EVERY 6 HOURS PRN
Status: DISCONTINUED | OUTPATIENT
Start: 2022-08-27 | End: 2022-08-29 | Stop reason: HOSPADM

## 2022-08-27 RX ORDER — PROMETHAZINE HYDROCHLORIDE 25 MG/1
12.5-25 TABLET ORAL EVERY 4 HOURS PRN
Status: DISCONTINUED | OUTPATIENT
Start: 2022-08-27 | End: 2022-08-29 | Stop reason: HOSPADM

## 2022-08-27 RX ORDER — SODIUM CHLORIDE AND POTASSIUM CHLORIDE 300; 900 MG/100ML; MG/100ML
INJECTION, SOLUTION INTRAVENOUS CONTINUOUS
Status: DISCONTINUED | OUTPATIENT
Start: 2022-08-27 | End: 2022-08-27

## 2022-08-27 RX ORDER — OXYCODONE HYDROCHLORIDE 5 MG/1
10 TABLET ORAL PRN
COMMUNITY
End: 2022-08-31

## 2022-08-27 RX ORDER — SODIUM CHLORIDE 9 MG/ML
500 INJECTION, SOLUTION INTRAVENOUS ONCE
Status: DISCONTINUED | OUTPATIENT
Start: 2022-08-27 | End: 2022-08-27

## 2022-08-27 RX ORDER — PAROXETINE 30 MG/1
60 TABLET, FILM COATED ORAL DAILY
COMMUNITY
End: 2022-08-31

## 2022-08-27 RX ORDER — CHOLECALCIFEROL (VITAMIN D3) 125 MCG
1000 CAPSULE ORAL DAILY
Status: DISCONTINUED | OUTPATIENT
Start: 2022-08-28 | End: 2022-08-29 | Stop reason: HOSPADM

## 2022-08-27 RX ORDER — OXYCODONE HYDROCHLORIDE 5 MG/1
2.5 TABLET ORAL
Status: DISCONTINUED | OUTPATIENT
Start: 2022-08-27 | End: 2022-08-28

## 2022-08-27 RX ORDER — SODIUM CHLORIDE AND POTASSIUM CHLORIDE 300; 900 MG/100ML; MG/100ML
INJECTION, SOLUTION INTRAVENOUS CONTINUOUS
Status: DISPENSED | OUTPATIENT
Start: 2022-08-27 | End: 2022-08-28

## 2022-08-27 RX ORDER — OXYCODONE HYDROCHLORIDE 5 MG/1
5 TABLET ORAL
Status: DISCONTINUED | OUTPATIENT
Start: 2022-08-27 | End: 2022-08-28

## 2022-08-27 RX ORDER — QUETIAPINE FUMARATE 100 MG/1
100 TABLET, FILM COATED ORAL
COMMUNITY
End: 2022-08-31

## 2022-08-27 RX ORDER — ONDANSETRON 2 MG/ML
4 INJECTION INTRAMUSCULAR; INTRAVENOUS ONCE
Status: COMPLETED | OUTPATIENT
Start: 2022-08-27 | End: 2022-08-27

## 2022-08-27 RX ORDER — PROCHLORPERAZINE EDISYLATE 5 MG/ML
5-10 INJECTION INTRAMUSCULAR; INTRAVENOUS EVERY 4 HOURS PRN
Status: DISCONTINUED | OUTPATIENT
Start: 2022-08-27 | End: 2022-08-29 | Stop reason: HOSPADM

## 2022-08-27 RX ORDER — QUETIAPINE FUMARATE 25 MG/1
25 TABLET, FILM COATED ORAL 3 TIMES DAILY PRN
COMMUNITY
End: 2022-08-31

## 2022-08-27 RX ORDER — MAGNESIUM SULFATE HEPTAHYDRATE 40 MG/ML
2 INJECTION, SOLUTION INTRAVENOUS ONCE
Status: COMPLETED | OUTPATIENT
Start: 2022-08-27 | End: 2022-08-27

## 2022-08-27 RX ORDER — GAUZE BANDAGE 2" X 2"
100 BANDAGE TOPICAL 2 TIMES DAILY
Status: DISCONTINUED | OUTPATIENT
Start: 2022-08-27 | End: 2022-08-29 | Stop reason: HOSPADM

## 2022-08-27 RX ORDER — ONDANSETRON 2 MG/ML
4 INJECTION INTRAMUSCULAR; INTRAVENOUS EVERY 4 HOURS PRN
Status: DISCONTINUED | OUTPATIENT
Start: 2022-08-27 | End: 2022-08-29 | Stop reason: HOSPADM

## 2022-08-27 RX ORDER — AMOXICILLIN 250 MG
2 CAPSULE ORAL 2 TIMES DAILY
Status: DISCONTINUED | OUTPATIENT
Start: 2022-08-27 | End: 2022-08-29 | Stop reason: HOSPADM

## 2022-08-27 RX ORDER — POLYETHYLENE GLYCOL 3350 17 G/17G
1 POWDER, FOR SOLUTION ORAL
Status: DISCONTINUED | OUTPATIENT
Start: 2022-08-27 | End: 2022-08-29 | Stop reason: HOSPADM

## 2022-08-27 RX ORDER — SODIUM CHLORIDE 9 MG/ML
500 INJECTION, SOLUTION INTRAVENOUS ONCE
Status: COMPLETED | OUTPATIENT
Start: 2022-08-27 | End: 2022-08-27

## 2022-08-27 RX ORDER — MORPHINE SULFATE 4 MG/ML
2 INJECTION INTRAVENOUS
Status: DISCONTINUED | OUTPATIENT
Start: 2022-08-27 | End: 2022-08-28

## 2022-08-27 RX ORDER — ONDANSETRON 4 MG/1
4 TABLET, ORALLY DISINTEGRATING ORAL EVERY 4 HOURS PRN
Status: DISCONTINUED | OUTPATIENT
Start: 2022-08-27 | End: 2022-08-29 | Stop reason: HOSPADM

## 2022-08-27 RX ADMIN — ENOXAPARIN SODIUM 30 MG: 30 INJECTION SUBCUTANEOUS at 19:38

## 2022-08-27 RX ADMIN — Medication 100 MG: at 19:58

## 2022-08-27 RX ADMIN — SODIUM CHLORIDE, POTASSIUM CHLORIDE, SODIUM LACTATE AND CALCIUM CHLORIDE 1170 ML: 600; 310; 30; 20 INJECTION, SOLUTION INTRAVENOUS at 15:15

## 2022-08-27 RX ADMIN — MAGNESIUM SULFATE HEPTAHYDRATE 2 G: 40 INJECTION, SOLUTION INTRAVENOUS at 19:33

## 2022-08-27 RX ADMIN — ONDANSETRON 4 MG: 2 INJECTION INTRAMUSCULAR; INTRAVENOUS at 15:52

## 2022-08-27 RX ADMIN — QUETIAPINE FUMARATE 100 MG: 100 TABLET ORAL at 22:28

## 2022-08-27 RX ADMIN — PAROXETINE 60 MG: 30 TABLET, FILM COATED ORAL at 22:28

## 2022-08-27 RX ADMIN — IOHEXOL 80 ML: 350 INJECTION, SOLUTION INTRAVENOUS at 15:08

## 2022-08-27 RX ADMIN — POTASSIUM CHLORIDE AND SODIUM CHLORIDE: 900; 300 INJECTION, SOLUTION INTRAVENOUS at 19:33

## 2022-08-27 RX ADMIN — KETAMINE HYDROCHLORIDE 25 MG: 10 INJECTION, SOLUTION INTRAMUSCULAR; INTRAVENOUS at 16:03

## 2022-08-27 RX ADMIN — OXYCODONE 5 MG: 5 TABLET ORAL at 19:59

## 2022-08-27 RX ADMIN — MIDODRINE HYDROCHLORIDE 5 MG: 5 TABLET ORAL at 22:28

## 2022-08-27 RX ADMIN — NICOTINE TRANSDERMAL SYSTEM 21 MG: 21 PATCH, EXTENDED RELEASE TRANSDERMAL at 20:00

## 2022-08-27 RX ADMIN — FOLIC ACID 1 MG: 1 TABLET ORAL at 19:59

## 2022-08-27 RX ADMIN — MORPHINE SULFATE 4 MG: 4 INJECTION INTRAVENOUS at 17:39

## 2022-08-27 RX ADMIN — SODIUM CHLORIDE 500 ML: 9 INJECTION, SOLUTION INTRAVENOUS at 21:54

## 2022-08-27 ASSESSMENT — ENCOUNTER SYMPTOMS
FALLS: 1
SEIZURES: 0
FEVER: 0
BRUISES/BLEEDS EASILY: 0
HEARTBURN: 0
VOMITING: 0
BLURRED VISION: 0
DEPRESSION: 0
SHORTNESS OF BREATH: 0
ABDOMINAL PAIN: 0
COUGH: 0
CHILLS: 0
NAUSEA: 0
DIZZINESS: 1
WEAKNESS: 1
DIAPHORESIS: 0
LOSS OF CONSCIOUSNESS: 1
PALPITATIONS: 0
WEIGHT LOSS: 1
FOCAL WEAKNESS: 0
MYALGIAS: 0
DOUBLE VISION: 0

## 2022-08-27 ASSESSMENT — FIBROSIS 4 INDEX: FIB4 SCORE: 0.34

## 2022-08-27 ASSESSMENT — LIFESTYLE VARIABLES: SUBSTANCE_ABUSE: 0

## 2022-08-27 NOTE — ED PROVIDER NOTES
"ED Provider Note    Scribed for Roland Green M.D. by Joe Boudreaux. 8/27/2022  3:43 PM    Primary care provider: No primary care provider noted  Means of arrival: EMS  History obtained from: Patient  History limited by: None    CHIEF COMPLAINT  Chief Complaint   Patient presents with    Trauma Red     Pt BIBA from home s/p GLF with head strike. PT states she was recently diagnosed with pancreatitis and has been vomiting persistently for \"a few days\", got dizzy, lost consciousness, and fell. No obvious trauma to patient, GCS 15, pt hypotensive SBP 80's upon EMS arrival. No blood thinners reported by pt.       HPI  Mary Martinez is a 52 y.o. female who presents to the Emergency Department via EMS for evaluation of weakness onset prior to arrival. Patient was recently diagnosed with pancreatitis one week ago. She was put on a new antibiotic. She has not been eating or drinking for the past two weeks. Patient felt weak when she stood up today and then fell. Patient called EMS herself. Patient hit the back of her head but denies loss of consciousness. She was given fluids and zofran en route. Her blood pressures read low at 86/50. She admits to associated symptoms of diffuse abdominal pain, dyspnea, head pain, and nausea, but denies vomiting, diarrhea, blood in vomit, fever, or dysuria. No alleviating factors were reported. Patient denies blood thinner use.       REVIEW OF SYSTEMS  Pertinent positives include: weakness, lightheadedness, abdominal pain, dyspnea, head pain, and nausea. Pertinent negatives include: vomiting, diarrhea, blood in vomit, fever, or dysuria. See history of present illness. All other systems are negative.     PAST MEDICAL HISTORY       SURGICAL HISTORY  patient denies any surgical history    SOCIAL HISTORY      Social History     Substance and Sexual Activity   Drug Use Not on file       FAMILY HISTORY  No family history noted.    CURRENT MEDICATIONS  Home Medications       Reviewed by " "Maryam Jaquez R.N. (Registered Nurse) on 08/27/22 at 1517  Med List Status: Not Addressed     Medication Last Dose Status        Patient Willie Taking any Medications                           ALLERGIES  Allergies   Allergen Reactions    Abilify     Nitrous Oxide     Penicillin G        PHYSICAL EXAM  VITAL SIGNS: /67   Pulse 93   Temp 36.9 °C (98.5 °F) (Tympanic)   Resp 16   Ht 1.6 m (5' 3\")   Wt 39 kg (86 lb)   SpO2 96%   BMI 15.23 kg/m²     Constitutional: Alert in no apparent distress.  HENT: No large edema or ecchymosis to head, No signs of trauma, Bilateral external ears normal, Nose normal. Uvula midline.   Eyes: Pupils are 2mm equal and reactive, Conjunctiva normal, Non-icteric.   Neck: Normal range of motion, No tenderness, Supple, No stridor.   Lymphatic: No lymphadenopathy noted.   Cardiovascular: Tachycardic and regular rhythm, no murmurs.   Thorax & Lungs: Normal breath sounds, No respiratory distress, No wheezing, No chest tenderness.   Abdomen:  Diffusely tender abdomen,Soft, No peritoneal signs, No masses, No pulsatile masses.   Skin: Warm, Dry, No erythema, No rash.   Back: No bony tenderness, No CVA tenderness.   Extremities: Right upper extremity missing digits 2-5. Intact distal pulses, No edema, No tenderness, No cyanosis.  Musculoskeletal: Good range of motion in all major joints. No tenderness to palpation or major deformities noted.   Neurologic: Alert , Normal motor function, Normal sensory function, No focal deficits noted.   Psychiatric: Affect normal, Judgment normal, Mood normal.     DIAGNOSTIC STUDIES / PROCEDURES    LABS  Labs Reviewed   LACTIC ACID - Abnormal; Notable for the following components:       Result Value    Lactic Acid 2.8 (*)     All other components within normal limits   COMP METABOLIC PANEL - Abnormal; Notable for the following components:    Potassium 3.4 (*)     Co2 15 (*)     Glucose 106 (*)     AST(SGOT) 10 (*)     Alkaline Phosphatase 123 (*)     " "Globulin 4.1 (*)     All other components within normal limits   CBC WITHOUT DIFFERENTIAL - Abnormal; Notable for the following components:    MCHC 33.2 (*)     Platelet Count 553 (*)     All other components within normal limits   LIPASE   LACTIC ACID   PROTHROMBIN TIME   APTT   HCG QUAL SERUM   DIAGNOSTIC ALCOHOL   COD (ADULT)   ESTIMATED GFR   URINALYSIS   LACTIC ACID   BLOOD CULTURE    Narrative:     1 of 2 for Blood Culture x 2 sites order. Per Hospital  Policy: Only change Specimen Src: to \"Line\" if specified by  physician order.   BLOOD CULTURE    Narrative:     2 of 2 blood culture x2  Sites order. Per Hospital Policy:  Only change Specimen Src: to \"Line\" if specified by physician  order.   COMPONENT CELLULAR      All labs reviewed by me.    RADIOLOGY  US-ABDOMEN F.A.S.T. LTD (FOR ED USE ONLY)   Final Result      No free fluid seen in all 4 quadrants.      Negative FAST scan.            CT-ABDOMEN-PELVIS WITH   Final Result         1. No acute inflammatory change or pelvis.   2. No small bowel obstruction. Mildly prominent fluid-filled proximal duodenum with decompression distally. This is nonspecific but can be seen with SMA syndrome in thin patient.   3. Nonobstructive bilateral renal calculi      CT-HEAD W/O   Final Result         1. No acute intracranial abnormality. No evidence of acute intracranial hemorrhage or mass lesion.                     DX-PELVIS-1 OR 2 VIEWS   Final Result      1.  No acute displaced pelvic or proximal femur fracture.      DX-CHEST-LIMITED (1 VIEW)   Final Result         No acute cardiopulmonary abnormalities are identified.      EC-ECHOCARDIOGRAM COMPLETE W/O CONT    (Results Pending)   IR-CONSULT AND TREAT    (Results Pending)     The radiologist's interpretation of all radiological studies have been reviewed by me.    COURSE & MEDICAL DECISION MAKING  Nursing notes, VS, PMSFHx reviewed in chart.    52 y.o. female p/w chief complaint of weakness.    2:52 PM Patient seen and " examined at bedside. Ordered ketamine 25 mg in NS 50 mL IV.     5:30 PM Patient was reevaluated at bedside. Ordered morphine 4 mg/mL. Paged Hospitalist.    5:50 PM I discussed the patient's case and the above findings with Dr. Cuellar (Hospitalist) who agrees to evaluate patient.    I verified that the patient was wearing a mask and I was wearing appropriate PPE every time I entered the room. The patient's mask was on the patient at all times during my encounter except for a brief view of the oropharynx.     The differential diagnoses include but are not limited to:   #abdominal pain, weakness    CT scan of abdomen   Pt with low blood pressure  No e/o rash or zoster  No e/o UTI  ECG unremarkable and no chest pain to suggest intrathoracic etiology of abd pain  Patient with CT evidence of superior mesenteric artery syndrome which may be contributing to patient's inability to tolerate p.o. and hypotension and syncope prior to arrival  Plan for admission and slow rehydration and if unable to tolerate potential surgical consult or consideration for feeding tube      DISPOSITION:  Patient will be hospitalized by Dr. Cuellar (Hospitalist) in guarded condition.      FINAL IMPRESSION  1. Closed head injury, initial encounter    2. Superior mesenteric artery syndrome (HCC)    3. Acute abdominal pain          Joe MOLINA (Ruby), am scribing for, and in the presence of, Roland Green M.D..    Electronically signed by: Joe Boudreaux (Thomasibe), 8/27/2022    Roland MOLINA M.D. personally performed the services described in this documentation, as scribed by Joe Boudreaux in my presence, and it is both accurate and complete.    The note accurately reflects work and decisions made by me.  Roland Green M.D.  8/27/2022  8:04 PM

## 2022-08-27 NOTE — ED NOTES
Per [facility]SE Report:    Pt is a 52 year old female, she was diagnosed with pancreatitis a few weeks ago, and pt has had issues with eating and drinking since the diagnosis. Pt was on the toilet when she stood up and became dizzy and she passed out. Pt indicates that she hit her head and had a positive LOC. Hypotensive systolic 80's.        300mL of NS  4 mg zofran    Meds: Unknown  Hx: Pancreatitis  Allergies: none

## 2022-08-27 NOTE — ED NOTES
Pt states pain has improved slightly, from 10/10 to 7/10 after ketamine. Still reports uncontrolled pain. Dr. Green notified.

## 2022-08-27 NOTE — ED TRIAGE NOTES
"Chief Complaint   Patient presents with    Trauma Red     Pt BIBA from home s/p GLF with head strike. PT states she was recently diagnosed with pancreatitis and has been vomiting persistently for \"a few days\", got dizzy, lost consciousness, and fell. No obvious trauma to patient, GCS 15, pt hypotensive SBP 80's upon EMS arrival. No blood thinners reported by pt.       /67   Pulse 93   Temp 36.9 °C (98.5 °F) (Tympanic)   Resp 16   Ht 1.6 m (5' 3\")   Wt 39 kg (86 lb)   SpO2 96%   BMI 15.23 kg/m²     "

## 2022-08-27 NOTE — ED NOTES
Pt by armando to blue 14. Report received from LEANNE Francisco. COD sent to lab (repeat draw - first specimen has trauma name & lab called to request recollect with pt's name).

## 2022-08-27 NOTE — H&P
TRAUMA HISTORY AND PHYSICAL    DATE OF SERVICE: 8/27/2022    ACTIVATION LEVEL: Red.     HISTORY OF PRESENT ILLNESS: The patient is a 52 year-old female who was injured after sustaining a ground level fall. She was on the toilet, stood up and passed out.  When EMS arrived she was noted to be hypotensive.      The patient was triaged to [facility] [place] in accordance with established pre hospital protocols. An expeditious primary and secondary survey with required adjuncts was conducted. See Trauma Narrator for full details.    Upon arrival, the patient reports not feeling well for the past few weeks.  She was diagnosed with pancreatitis back then and has had difficulties eating and drinking since then.  She was hypotensive in the trauma room.  FAST negative.  CXR and pelvic x-rays were unrevealing.    PAST MEDICAL HISTORY:  Diagnosis Date    Allergy      Anemia      Anxiety      Arthritis       Rheumatoid -follows with rheumatologist. Has several fractures due to RA.    ASTHMA       inhalers prn    Blood transfusion without reported diagnosis      Bowel habit changes       4/24/20-constipation and diarrhea.    Bronchitis last approx 2018    Chronic pain 04/24/2020     Due to RA    Dental disorder       no teeth upper-does not wear her denture. Broken teeth on bottom.    Depression      GERD (gastroesophageal reflux disease)      Head ache      Heart burn       taking famotidine    Hiatus hernia syndrome      History of pancreatitis      Indigestion       taking famotidine    Kidney disease      Pain       everywhere    Pneumonia 10/2019    Psychiatric problem       anxiety and depression    Rheumatoid arthritis(714.0) 2003    Substance abuse (HCC)              PSHx:   Past Surgical History[]Expand by Default         Past Surgical History:   Procedure Laterality Date    OH ENDOSCOPIC US EXAM, SANTINOOPH   4/29/2020     Procedure: EGD, WITH ENDOSCOPIC US;  Surgeon: Jorge Brooke M.D.;  Location:  SURGERY [facility];  Service: Gastroenterology    GASTROSCOPY-ENDO   4/29/2020     Procedure: GASTROSCOPY;  Surgeon: Jorge Brooke M.D.;  Location: [facility];  Service: Gastroenterology    EGD WITH ASP/BX   4/29/2020     Procedure: EGD, WITH ASPIRATION BIOPSY - POSS FNA;  Surgeon: Jorge Brooke M.D.;  Location: [facility];  Service: Gastroenterology    DE EXPLORATORY OF ABDOMEN N/A 10/4/2019     Procedure: LAPAROTOMY, EXPLORATORY AND REPAIR OF DUODENAL PERFORATION;  Surgeon: James Dumont M.D.;  Location: [facility];  Service: General    COLONOSCOPY   2018     with upper endoscopy    FINGER ARTHROPLASTY Right 6/5/2017     Procedure: FINGER ARTHROPLASTY - LONG, RING AND SMALL VOLAR PLATE;  Surgeon: Lobo Rosen M.D.;  Location: SURGERY SAME DAY [facility];  Service:     FINGER AMPUTATION   6/5/2017     Procedure: FINGER AMPUTATION - LONG, RING AND SMALL AT THE PROXIMAL INTERPHALANGEAL JOINT;  Surgeon: Lobo Rosen M.D.;  Location: SURGERY SAME DAY [facility];  Service:     FINGER ARTHROPLASTY Right 4/17/2017     Procedure: FINGER ARTHROPLASTY ;  Surgeon: Lobo Rosen M.D.;  Location: SURGERY SAME DAY [facility];  Service:     FINGER AMPUTATION Right 9/14/2016     Procedure: FINGER AMPUTATION INDEX;  Surgeon: Lobo Rosen M.D.;  Location: SURGERY SAME DAY [facility];  Service:     IRRIGATION & DEBRIDEMENT ORTHO Right 9/11/2016     Procedure: IRRIGATION & DEBRIDEMENT ORTHO - right index finger;  Surgeon: Madhu Rosen M.D.;  Location: [facility];  Service:     FUSION, PIP JOINT, TOE Right 8/29/2016     Procedure: RE-DO INDEX FINGER PROXIMAL INTERPHALANGEAL ARTHRODESIS;  Surgeon: Lobo Rosen M.D.;  Location: SURGERY SAME DAY [facility];  Service:     BONE GRAFT Right 8/29/2016     Procedure: BONE GRAFT - DISTAL RADIUS ;  Surgeon: Lobo Rosen M.D.;  Location: [facility]  DAY [facility];  Service:     ARTHRODESIS Right 5/6/2016     Procedure: ARTHRODESIS INDEX FINGER PROXIMAL INTERPHALANGEAL;  Surgeon: Lobo Rosen M.D.;  Location: SURGERY SAME DAY [facility];  Service:     FOOT RECONSTRUCTION RHEUMATIC Left 7/29/2015     Procedure: FOOT RECONSTRUCTION RHEUMATIC;  Surgeon: Heriberto Alves M.D.;  Location: SURGERY [facility];  Service:     TOE FUSION Right 5/27/2015     Procedure: TOE FUSION 1ST METATARSALPHALANGEAL JOINT;  Surgeon: Heriberto Alves M.D.;  Location: SURGERY [facility];  Service:     BONE SPUR EXCISION   5/27/2015     Procedure: BONE SPUR EXCISION METATARSAL HEAD 2-3;  Surgeon: Heriberto Alves M.D.;  Location: SURGERY [facility];  Service:     HAMMERTOE CORRECTION   5/27/2015     Procedure: HAMMERTOE CORRECTION AND SOFT TISSUE RE-ALINGMENT 2-3 ;  Surgeon: Heriberto Alves M.D.;  Location: SURGERY [facility];  Service:     DENTAL EXTRACTION(S)   2014     all of upper teeth    ABDOMINAL ABSCESS DRAINAGE   9/27/2011     Performed by VERO WRIGHT at [facility]    EMANUEL BY LAPAROSCOPY   9/27/2011     Performed by VERO WRIGHT at [facility]    APPENDECTOMY   2011     Found out it had ruptured prior to abcess surgery    REPEAT C SECTION   2008    REPEAT C SECTION   2005    REPEAT C SECTION   1999    PRIMARY C SECTION   1991    TONSILLECTOMY   1982    WRIST EXPLORATION Left 1980's     fractured wrist-no hardware           ALLERGIES: Abilify, Nitrous oxide, and Penicillin g       CURRENT MEDICATIONS:   Please refer to the medication reconciliation in [facility]    FAMILY HISTORY:   Reviewed and found to be non-contributory in regards to the above presentation    SOCIAL HISTORY:  Patient denies habitual use of alcohol, tobacco, and illicit drugs    REVIEW OF SYSTEMS:   Comprehensive review of systems is negative with the exception of the aforementioned HPI, PMH, and PSH bullets in accordance with CMS  "guidelines.    PHYSICAL EXAMINATION:   VITAL SIGNS:   /67   Pulse 93   Temp 36.9 °C (98.5 °F) (Tympanic)   Resp 16   Ht 1.6 m (5' 3\")   Wt 39 kg (86 lb)   SpO2 96%     GENERAL:   The patient appears stated age    HEENT:  HEAD: Atraumatic, normocephalic.    EARS: The ear canals and tympanic membranes are normal.Normal pinna bilaterally.    EYES:  The pupils are equal, round, and reactive to light bilaterally. The extraocular muscles are intact bilaterally..    NOSE: No rhinorrhea.  The bilateral nares are clear.  THROAT: Oral mucosa is moist.  The oropharynx are clear.    FACE:   The midface and jaw are stable. No malocclusion is evident.    NECK:    The cervical spine was examined utilizing spinal motion restriction.   No posterior midline cervical-spine tenderness, no evidence of intoxication, normal level of alertness ([place] Coma Scale 15), no focal neurologic deficit, and no painful distracting injuries..    CHEST:    Inspection: Unlabored respirations, no intercostal retractions, paradoxical motion, or accessory muscle use.  Palpation:  The chest is nontender. The clavicles are non deformed bilaterally..  Auscultation: clear to auscultation.    CARDIOVASCULAR:    Auscultation: regular rate and rhythm.  Peripheral Pulses: Normal.      ABDOMEN:    Abdomen is soft, nontender, without organomegaly or masses.    BACK/PELVIS:    The thoracolumbar spine was examined utilizing spinal motion restriction.   Inspection and palpation reveal no significant tenderness, swelling, or deformity in the thoracolumbar region.  The pelvis is stable.    RECTAL:  Deferred    GENITOURINARY:  The patient has normal external reproductive anatomy.    EXTREMITIES:  RIGHT ARM: Without deformities, wounds, lacerations, or excoriations.  Full passive and active range of motion without pain.  LEFT ARM: Without deformities, wounds, lacerations, or excoriations.  Full passive and active range of motion without pain.  RIGHT LEG: " Without deformities, wounds, lacerations, or excoriations.  Full passive and active range of motion without pain.  LEFT LEG: Without deformities, wounds, lacerations, or excoriations.  Full passive and active range of motion without pain.    NEUROLOGIC:    [place] Coma Scale (GCS) 15.   Neurologic examination revealed no focal deficits noted, mental status intact.    SKIN:  The skin is warm, dry, and well purfused.    PSYCHIATRIC:   The patient does not appear depressed or anxious.    LABORATORY VALUES:   Recent Labs     08/27/22  1438   WBC 7.3   RBC 4.20   HEMOGLOBIN 13.1   HEMATOCRIT 39.5   MCV 94.0   MCH 31.2   MCHC 33.2*   RDW 48.1   PLATELETCT 553*   MPV 9.8         Recent Labs     08/27/22  1438   INR 1.09     Recent Labs     08/27/22  1438   APTT 29.8   INR 1.09        IMAGING:   CT-ABDOMEN-PELVIS WITH   Final Result         1. No acute inflammatory change or pelvis.   2. No small bowel obstruction. Mildly prominent fluid-filled proximal duodenum with decompression distally. This is nonspecific but can be seen with SMA syndrome in thin patient.   3. Nonobstructive bilateral renal calculi      CT-HEAD W/O   Final Result         1. No acute intracranial abnormality. No evidence of acute intracranial hemorrhage or mass lesion.                     DX-PELVIS-1 OR 2 VIEWS   Final Result      1.  No acute displaced pelvic or proximal femur fracture.      DX-CHEST-LIMITED (1 VIEW)   Final Result         No acute cardiopulmonary abnormalities are identified.      US-ABDOMEN F.A.S.T. LTD (FOR ED USE ONLY)    (Results Pending)       IMPRESSION AND PLAN:  1) Ground level fall and blunt head trauma:     No significant traumatic injuries identified after thorough clinical, radiographic, and laboratory evaluations.    No significant post-concussive symptoms at this time.  Recommend medical evaluation for falls.     Trauma signing off.     I independently reviewed pertinent clinical lab tests since admission and ordered  additional follow up clinical lab tests.  I independently reviewed pertinent radiographic images and the radiologist's reports since admission and ordered additional follow up radiographic studies.  The details of the available patient records in [facility] (including laboratory tests, culture data, medications, imaging, and other pertinent diagnostic tests) and that information was utilized as warranted in today's medical decision making for this patient.  I personally evaluated the patient condition at bedside.     Care interventions include:   Review of interval medical and surgical history, current medications and outpatient medication reconciliation, interval imaging studies and radiologist interpretation, and interval laboratory values.  Evaluation of blunt head trauma after ground level fall.     Aggregated care time spent evaluating, reassessing, reviewing documentation, providing care, and managing this patient exclusive of procedures: 55 minutes  ____________________________________   FRANKLIN Guardado / [place]     DD: 8/27/2022   DT: 3:40 PM

## 2022-08-27 NOTE — ED NOTES
Pt drowsy, states pain slightly decreased but still 8/10. No other complaints, resting with eyes closed. On monitor.

## 2022-08-28 PROBLEM — R64 CACHEXIA (HCC): Status: ACTIVE | Noted: 2022-08-27

## 2022-08-28 LAB
ALBUMIN SERPL BCP-MCNC: 2.7 G/DL (ref 3.2–4.9)
ALBUMIN/GLOB SERPL: 0.9 G/DL
ALP SERPL-CCNC: 91 U/L (ref 30–99)
ALT SERPL-CCNC: 7 U/L (ref 2–50)
ANION GAP SERPL CALC-SCNC: 11 MMOL/L (ref 7–16)
AST SERPL-CCNC: 11 U/L (ref 12–45)
BASOPHILS # BLD AUTO: 0.7 % (ref 0–1.8)
BASOPHILS # BLD: 0.07 K/UL (ref 0–0.12)
BILIRUB SERPL-MCNC: <0.2 MG/DL (ref 0.1–1.5)
BUN SERPL-MCNC: 8 MG/DL (ref 8–22)
CALCIUM SERPL-MCNC: 8 MG/DL (ref 8.5–10.5)
CHLORIDE SERPL-SCNC: 113 MMOL/L (ref 96–112)
CO2 SERPL-SCNC: 15 MMOL/L (ref 20–33)
CREAT SERPL-MCNC: 0.67 MG/DL (ref 0.5–1.4)
EOSINOPHIL # BLD AUTO: 0.19 K/UL (ref 0–0.51)
EOSINOPHIL NFR BLD: 1.9 % (ref 0–6.9)
ERYTHROCYTE [DISTWIDTH] IN BLOOD BY AUTOMATED COUNT: 49.3 FL (ref 35.9–50)
GFR SERPLBLD CREATININE-BSD FMLA CKD-EPI: 106 ML/MIN/1.73 M 2
GLOBULIN SER CALC-MCNC: 2.9 G/DL (ref 1.9–3.5)
GLUCOSE SERPL-MCNC: 116 MG/DL (ref 65–99)
HCT VFR BLD AUTO: 30.8 % (ref 37–47)
HGB BLD-MCNC: 10 G/DL (ref 12–16)
IMM GRANULOCYTES # BLD AUTO: 0.05 K/UL (ref 0–0.11)
IMM GRANULOCYTES NFR BLD AUTO: 0.5 % (ref 0–0.9)
IRON SATN MFR SERPL: 28 % (ref 15–55)
IRON SERPL-MCNC: 50 UG/DL (ref 40–170)
LACTATE SERPL-SCNC: 1.6 MMOL/L (ref 0.5–2)
LYMPHOCYTES # BLD AUTO: 3.1 K/UL (ref 1–4.8)
LYMPHOCYTES NFR BLD: 30.3 % (ref 22–41)
MAGNESIUM SERPL-MCNC: 2.2 MG/DL (ref 1.5–2.5)
MCH RBC QN AUTO: 31.2 PG (ref 27–33)
MCHC RBC AUTO-ENTMCNC: 32.5 G/DL (ref 33.6–35)
MCV RBC AUTO: 96 FL (ref 81.4–97.8)
MONOCYTES # BLD AUTO: 0.7 K/UL (ref 0–0.85)
MONOCYTES NFR BLD AUTO: 6.8 % (ref 0–13.4)
NEUTROPHILS # BLD AUTO: 6.11 K/UL (ref 2–7.15)
NEUTROPHILS NFR BLD: 59.8 % (ref 44–72)
NRBC # BLD AUTO: 0 K/UL
NRBC BLD-RTO: 0 /100 WBC
PHOSPHATE SERPL-MCNC: 3 MG/DL (ref 2.5–4.5)
PLATELET # BLD AUTO: 428 K/UL (ref 164–446)
PMV BLD AUTO: 9.7 FL (ref 9–12.9)
POTASSIUM SERPL-SCNC: 3.1 MMOL/L (ref 3.6–5.5)
PREALB SERPL-MCNC: 20.5 MG/DL (ref 18–38)
PROT SERPL-MCNC: 5.6 G/DL (ref 6–8.2)
RBC # BLD AUTO: 3.21 M/UL (ref 4.2–5.4)
SODIUM SERPL-SCNC: 139 MMOL/L (ref 135–145)
TIBC SERPL-MCNC: 180 UG/DL (ref 250–450)
UIBC SERPL-MCNC: 130 UG/DL (ref 110–370)
VIT B12 SERPL-MCNC: 309 PG/ML (ref 211–911)
WBC # BLD AUTO: 10.2 K/UL (ref 4.8–10.8)

## 2022-08-28 PROCEDURE — A9270 NON-COVERED ITEM OR SERVICE: HCPCS | Performed by: STUDENT IN AN ORGANIZED HEALTH CARE EDUCATION/TRAINING PROGRAM

## 2022-08-28 PROCEDURE — 84134 ASSAY OF PREALBUMIN: CPT

## 2022-08-28 PROCEDURE — 92610 EVALUATE SWALLOWING FUNCTION: CPT

## 2022-08-28 PROCEDURE — 770006 HCHG ROOM/CARE - MED/SURG/GYN SEMI*

## 2022-08-28 PROCEDURE — 84425 ASSAY OF VITAMIN B-1: CPT

## 2022-08-28 PROCEDURE — 700111 HCHG RX REV CODE 636 W/ 250 OVERRIDE (IP): Performed by: STUDENT IN AN ORGANIZED HEALTH CARE EDUCATION/TRAINING PROGRAM

## 2022-08-28 PROCEDURE — 80053 COMPREHEN METABOLIC PANEL: CPT

## 2022-08-28 PROCEDURE — 84100 ASSAY OF PHOSPHORUS: CPT

## 2022-08-28 PROCEDURE — 700101 HCHG RX REV CODE 250: Performed by: STUDENT IN AN ORGANIZED HEALTH CARE EDUCATION/TRAINING PROGRAM

## 2022-08-28 PROCEDURE — 83735 ASSAY OF MAGNESIUM: CPT

## 2022-08-28 PROCEDURE — 700102 HCHG RX REV CODE 250 W/ 637 OVERRIDE(OP): Performed by: HOSPITALIST

## 2022-08-28 PROCEDURE — 83540 ASSAY OF IRON: CPT

## 2022-08-28 PROCEDURE — A9270 NON-COVERED ITEM OR SERVICE: HCPCS | Performed by: HOSPITALIST

## 2022-08-28 PROCEDURE — 83605 ASSAY OF LACTIC ACID: CPT

## 2022-08-28 PROCEDURE — 99232 SBSQ HOSP IP/OBS MODERATE 35: CPT | Performed by: HOSPITALIST

## 2022-08-28 PROCEDURE — 700102 HCHG RX REV CODE 250 W/ 637 OVERRIDE(OP): Performed by: STUDENT IN AN ORGANIZED HEALTH CARE EDUCATION/TRAINING PROGRAM

## 2022-08-28 PROCEDURE — 85025 COMPLETE CBC W/AUTO DIFF WBC: CPT

## 2022-08-28 PROCEDURE — 82607 VITAMIN B-12: CPT

## 2022-08-28 PROCEDURE — 700111 HCHG RX REV CODE 636 W/ 250 OVERRIDE (IP): Performed by: HOSPITALIST

## 2022-08-28 PROCEDURE — 83550 IRON BINDING TEST: CPT

## 2022-08-28 RX ORDER — OXYCODONE HYDROCHLORIDE 10 MG/1
10 TABLET ORAL
Status: DISCONTINUED | OUTPATIENT
Start: 2022-08-28 | End: 2022-08-29 | Stop reason: HOSPADM

## 2022-08-28 RX ORDER — OXYCODONE HYDROCHLORIDE 5 MG/1
5 TABLET ORAL
Status: DISCONTINUED | OUTPATIENT
Start: 2022-08-28 | End: 2022-08-29 | Stop reason: HOSPADM

## 2022-08-28 RX ORDER — MORPHINE SULFATE 4 MG/ML
2 INJECTION INTRAVENOUS
Status: DISCONTINUED | OUTPATIENT
Start: 2022-08-28 | End: 2022-08-29 | Stop reason: HOSPADM

## 2022-08-28 RX ORDER — SODIUM CHLORIDE AND POTASSIUM CHLORIDE 300; 900 MG/100ML; MG/100ML
INJECTION, SOLUTION INTRAVENOUS ONCE
Status: COMPLETED | OUTPATIENT
Start: 2022-08-28 | End: 2022-08-28

## 2022-08-28 RX ORDER — POTASSIUM CHLORIDE 20 MEQ/1
40 TABLET, EXTENDED RELEASE ORAL ONCE
Status: COMPLETED | OUTPATIENT
Start: 2022-08-28 | End: 2022-08-28

## 2022-08-28 RX ADMIN — MORPHINE SULFATE 2 MG: 4 INJECTION INTRAVENOUS at 12:55

## 2022-08-28 RX ADMIN — POTASSIUM CHLORIDE AND SODIUM CHLORIDE: 900; 300 INJECTION, SOLUTION INTRAVENOUS at 20:58

## 2022-08-28 RX ADMIN — MIDODRINE HYDROCHLORIDE 5 MG: 5 TABLET ORAL at 11:33

## 2022-08-28 RX ADMIN — OXYCODONE 5 MG: 5 TABLET ORAL at 07:59

## 2022-08-28 RX ADMIN — POTASSIUM CHLORIDE 40 MEQ: 1500 TABLET, EXTENDED RELEASE ORAL at 11:33

## 2022-08-28 RX ADMIN — MORPHINE SULFATE 2 MG: 4 INJECTION INTRAVENOUS at 19:26

## 2022-08-28 RX ADMIN — ONDANSETRON 4 MG: 2 INJECTION INTRAMUSCULAR; INTRAVENOUS at 20:58

## 2022-08-28 RX ADMIN — MIDODRINE HYDROCHLORIDE 5 MG: 5 TABLET ORAL at 08:19

## 2022-08-28 RX ADMIN — Medication 100 MG: at 05:56

## 2022-08-28 RX ADMIN — DOCUSATE SODIUM 50 MG AND SENNOSIDES 8.6 MG 2 TABLET: 8.6; 5 TABLET, FILM COATED ORAL at 05:58

## 2022-08-28 RX ADMIN — Medication 100 MG: at 18:24

## 2022-08-28 RX ADMIN — QUETIAPINE FUMARATE 100 MG: 100 TABLET ORAL at 21:56

## 2022-08-28 RX ADMIN — PAROXETINE 60 MG: 30 TABLET, FILM COATED ORAL at 18:24

## 2022-08-28 RX ADMIN — CYANOCOBALAMIN TAB 500 MCG 1000 MCG: 500 TAB at 05:56

## 2022-08-28 RX ADMIN — FOLIC ACID 1 MG: 1 TABLET ORAL at 05:57

## 2022-08-28 RX ADMIN — OXYCODONE HYDROCHLORIDE 10 MG: 10 TABLET ORAL at 18:24

## 2022-08-28 RX ADMIN — POTASSIUM CHLORIDE AND SODIUM CHLORIDE: 900; 300 INJECTION, SOLUTION INTRAVENOUS at 08:00

## 2022-08-28 RX ADMIN — OXYCODONE 5 MG: 5 TABLET ORAL at 11:33

## 2022-08-28 RX ADMIN — FOLIC ACID 1 MG: 1 TABLET ORAL at 18:24

## 2022-08-28 ASSESSMENT — ENCOUNTER SYMPTOMS
VOMITING: 1
EYE DISCHARGE: 0
PALPITATIONS: 0
COUGH: 0
DIAPHORESIS: 0
DIARRHEA: 0
LOSS OF CONSCIOUSNESS: 1
FOCAL WEAKNESS: 0
SORE THROAT: 0
SPEECH CHANGE: 0
DEPRESSION: 0
CHILLS: 0
WEIGHT LOSS: 1
CLAUDICATION: 0
WEAKNESS: 0
WHEEZING: 0
ABDOMINAL PAIN: 1
SPUTUM PRODUCTION: 0
EYE PAIN: 0
HEADACHES: 0
NAUSEA: 0
MYALGIAS: 0
SHORTNESS OF BREATH: 0
FEVER: 0
BACK PAIN: 0
DIZZINESS: 1
SENSORY CHANGE: 0
NECK PAIN: 0
CONSTIPATION: 0
BRUISES/BLEEDS EASILY: 0
HEMOPTYSIS: 0

## 2022-08-28 ASSESSMENT — PATIENT HEALTH QUESTIONNAIRE - PHQ9
1. LITTLE INTEREST OR PLEASURE IN DOING THINGS: NOT AT ALL
SUM OF ALL RESPONSES TO PHQ9 QUESTIONS 1 AND 2: 0
2. FEELING DOWN, DEPRESSED, IRRITABLE, OR HOPELESS: NOT AT ALL

## 2022-08-28 ASSESSMENT — PAIN DESCRIPTION - PAIN TYPE: TYPE: ACUTE PAIN

## 2022-08-28 ASSESSMENT — LIFESTYLE VARIABLES: SUBSTANCE_ABUSE: 0

## 2022-08-28 NOTE — PROGRESS NOTES
[facility] Medicine Daily Progress Note    Date of Service  8/28/2022    Chief Complaint  Mary Martinez is a 52 y.o. female admitted 8/27/2022 with intractable emesis.    Hospital Course  Mary Martinez is a 52 y.o. frail underweight female with history of rheumatoid arthritis who presented 8/27/2022 with trauma evaluation.  Patient was reported to have GL F, syncopal episode.  She was hypotensive with BP of 80/64 by ER arrival.  No acute etiology seen on trauma survey-CT head, CXR, x-ray pelvis, FAST ABD US.  Patient reported no eating drinking well for more than the past 2 weeks secondary to postprandial abdominal pain.  She reported drastic weight loss over the past few months.  Her CTAP showed no acute inflammatory changes, however, mild prominent fluid-filled proximal duodenum with decompression, suggestive of SMA syndrome.      Interval Problem Update  8/28:  : Patient currently is tolerating clear liquid diet.  She has not had emesis since admission.  She was seen by speech-language pathologist without overt signs of aspiration.  She has been advanced to a GI soft diet to see if she tolerates.  Her blood pressure remains soft 105/71 she was started on midodrine on admission 5 mg 3 times daily.  Will dc since clinically dehydrated.  She was also given 3 L IV fluid normal saline plus potassium with improvement of her hydration.  Vitamin B12 level is low at 309 started on vitamin B12 oral.  Dr. Keller consulted on patient and recommending bariatric surgery evaluation for a gastrojejunostomy tube.  I do not believe that IR will be able to place this.  Patient has had chronic weight loss over the last several months.  Also spoke to her about quitting smoking.  She is requesting her home oxycodone dose of 10 mg p.o. every 4 hours as needed abdominal pain.    I have discussed this patient's plan of care and discharge plan at IDT rounds today with Case Management, Nursing, Nursing leadership, and other members of the  IDT team.    Consultants/Specialty  general surgery    Code Status  DNAR/DNI    Disposition  Patient is not medically cleared for discharge.   Anticipate discharge to to home with close outpatient follow-up.  I have placed the appropriate orders for post-discharge needs.    Review of Systems  Review of Systems   Constitutional:  Positive for malaise/fatigue and weight loss. Negative for chills, diaphoresis and fever.   HENT:  Negative for congestion and sore throat.    Eyes:  Negative for pain and discharge.   Respiratory:  Negative for cough, hemoptysis, sputum production, shortness of breath and wheezing.    Cardiovascular:  Negative for chest pain, palpitations, claudication and leg swelling.   Gastrointestinal:  Positive for abdominal pain and vomiting. Negative for constipation, diarrhea, melena and nausea.   Genitourinary:  Negative for dysuria, frequency and urgency.   Musculoskeletal:  Negative for back pain, joint pain, myalgias and neck pain.   Skin:  Negative for itching and rash.   Neurological:  Positive for dizziness and loss of consciousness. Negative for sensory change, speech change, focal weakness, weakness and headaches.   Endo/Heme/Allergies:  Does not bruise/bleed easily.   Psychiatric/Behavioral:  Negative for depression, substance abuse and suicidal ideas.       Physical Exam  Temp:  [36.4 °C (97.6 °F)-36.9 °C (98.5 °F)] 36.4 °C (97.6 °F)  Pulse:  [] 76  Resp:  [16-21] 17  BP: ()/(52-85) 105/71  SpO2:  [90 %-97 %] 95 %    Physical Exam  Vitals and nursing note reviewed.   Constitutional:       General: She is not in acute distress.     Appearance: She is well-developed. She is ill-appearing. She is not diaphoretic.      Comments: Thin, cachectic   HENT:      Head: Normocephalic and atraumatic.      Nose: Nose normal.      Mouth/Throat:      Pharynx: No oropharyngeal exudate.   Eyes:      General: No scleral icterus.        Right eye: No discharge.         Left eye: No discharge.       Conjunctiva/sclera: Conjunctivae normal.      Pupils: Pupils are equal, round, and reactive to light.   Neck:      Thyroid: No thyromegaly.      Vascular: No JVD.      Trachea: No tracheal deviation.   Cardiovascular:      Rate and Rhythm: Normal rate and regular rhythm.      Heart sounds: Normal heart sounds. No murmur heard.    No friction rub. No gallop.   Pulmonary:      Effort: Pulmonary effort is normal. No respiratory distress.      Breath sounds: Normal breath sounds. No stridor. No wheezing or rales.   Chest:      Chest wall: No tenderness.   Abdominal:      General: Bowel sounds are normal. There is no distension.      Palpations: Abdomen is soft. There is no mass.      Tenderness: There is no abdominal tenderness. There is no guarding or rebound.   Musculoskeletal:         General: No tenderness. Normal range of motion.      Cervical back: Normal range of motion and neck supple.      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   Lymphadenopathy:      Cervical: No cervical adenopathy.   Skin:     General: Skin is warm and dry.      Findings: No erythema or rash.   Neurological:      General: No focal deficit present.      Mental Status: She is alert and oriented to person, place, and time.      Cranial Nerves: No cranial nerve deficit.      Motor: No abnormal muscle tone.      Coordination: Coordination normal.   Psychiatric:         Mood and Affect: Mood normal.         Behavior: Behavior normal.         Thought Content: Thought content normal.         Judgment: Judgment normal.       Fluids    Intake/Output Summary (Last 24 hours) at 8/28/2022 1410  Last data filed at 8/27/2022 1712  Gross per 24 hour   Intake 1520 ml   Output 0 ml   Net 1520 ml       Laboratory  Recent Labs     08/27/22  1438 08/28/22  0035   WBC 7.3 10.2   RBC 4.20 3.21*   HEMOGLOBIN 13.1 10.0*   HEMATOCRIT 39.5 30.8*   MCV 94.0 96.0   MCH 31.2 31.2   MCHC 33.2* 32.5*   RDW 48.1 49.3   PLATELETCT 553* 428   MPV 9.8 9.7      Recent Labs     08/27/22  1438 08/28/22  0035   SODIUM 137 139   POTASSIUM 3.4* 3.1*   CHLORIDE 107 113*   CO2 15* 15*   GLUCOSE 106* 116*   BUN 11 8   CREATININE 0.86 0.67   CALCIUM 9.2 8.0*     Recent Labs     08/27/22  1438   APTT 29.8   INR 1.09               Imaging  US-ABDOMEN F.A.S.T. LTD (FOR ED USE ONLY)   Final Result      No free fluid seen in all 4 quadrants.      Negative FAST scan.            CT-ABDOMEN-PELVIS WITH   Final Result         1. No acute inflammatory change or pelvis.   2. No small bowel obstruction. Mildly prominent fluid-filled proximal duodenum with decompression distally. This is nonspecific but can be seen with SMA syndrome in thin patient.   3. Nonobstructive bilateral renal calculi      CT-HEAD W/O   Final Result         1. No acute intracranial abnormality. No evidence of acute intracranial hemorrhage or mass lesion.                     DX-PELVIS-1 OR 2 VIEWS   Final Result      1.  No acute displaced pelvic or proximal femur fracture.      DX-CHEST-LIMITED (1 VIEW)   Final Result         No acute cardiopulmonary abnormalities are identified.      EC-ECHOCARDIOGRAM COMPLETE W/O CONT    (Results Pending)   IR-CONSULT AND TREAT    (Results Pending)        Assessment/Plan  * SMAS (superior mesenteric artery syndrome) (HCC)- (present on admission)  Assessment & Plan  Suspected  Postprandial pain, persistent hypotension, weight loss with severe protein caloric malnutrition    CT AP: mild prominent fluid-filled proximal duodenum with decompression, suggestive of SMA syndrome    Peripherally discussed with oncall vascular surgery -- recommended reversal of weight loss by aggressive nutrition, including enteral feeding, and eventual bariatric surgery consult for GJ tube nutrition.    Tobacco abuse- (present on admission)  Assessment & Plan  Cessation counseling provided  Nicotine replacement as requested    Syncope- (present on admission)  Assessment & Plan  No acute etiology seen on CT  head  Likely due to hypotension  F/u echo, orthostatic VS    Weakness- (present on admission)  Assessment & Plan  PT/OT  fall    ACP (advance care planning)- (present on admission)  Assessment & Plan  D/w pt -- DNR/DNI, agreeable to nutrition and IVF hydration    Hypokalemia- (present on admission)  Assessment & Plan  Replace  Replace Mg    Hypotension  Assessment & Plan  IVF, improving  Stop midodrine since BP improved s/p IVF hydration    Cachexia (HCC)- (present on admission)  Assessment & Plan  Diet intake as tolerated  Body mass index is 15.23 kg/m².      Severe protein-calorie malnutrition (HCC)  Assessment & Plan  P.o. intake as tolerated  Ensure  Discussed weight gain via nutrition, agreeable to GJ tube placement, IR consulted       VTE prophylaxis: SCDs/TEDs    I have performed a physical exam and reviewed and updated ROS and Plan today (8/28/2022). In review of yesterday's note (8/27/2022), there are no changes except as documented above.

## 2022-08-28 NOTE — THERAPY
"Speech Language Pathology   Clinical Swallow Evaluation     Patient Name: Mary Martinez  AGE:  52 y.o., SEX:  female  Medical Record #: [id number]  Today's Date: 8/28/2022    HPI: Patient is 52 y.o. female admitted 8/27/22 s/p GLF w/ head strike. PT states she was recently diagnosed with pancreatitis and has been vomiting persistently for \"a few days\", got dizzy, lost consciousness, and fell. Her CTAP showed no acute inflammatory changes, however, mild prominent fluid-filled proximal duodenum with decompression, suggestive of SMA syndrome.    PMHx: rheumatoid arthritis.      CXR 8/27/22: No acute cardiopulmonary abnormalities are identified.      Level of Consciousness: Alert, Awake  Affect/Behavior: Cooperative  Follows Directives: Yes  Orientation: Oriented x 4  Hearing: Functional hearing  Vision: Functional vision      Prior Living Situation & Level of Function: Living at home independently. Pt reported eating a regular diet at baseline. Has difficulty eating nuts but has dentures that she dons when she wants to eat certain textures that are too difficult without. Pt reported \"scoping\" done in July 2022 by GI (pt confirmed it was an EGD) which was \"normal.\" During her follow up visit in August with GI she was told she had thrush but was not prescribed antibiotics until last Monday by her primary MD. Pt reported globus sensation occurs mostly with pills and reported had worsened during the time she had thrush. Pt reported smoker's cough is baseline.       Oral Mechanism Evaluation  Facial Symmetry: Equal  Facial Sensation: Equal  Labial Observations: WFL  Lingual Observations: Midline  Dentition: Poor, Upper partial, Lower partial  Comments: No indications of thrush       Voice  Quality: Hoarse   Resonance: WFL  Intensity: Appropriate  Cough: WFL  Comments:      Current Method of Nutrition   Oral diet (clear liquid but OK to advance to GI soft per MD)       Assessment  Positioning: [place]'s (60-90 degrees)  Bolus " Administration: Patient  Oxygen Requirements: Room Air  Factor(s) Affecting Performance: None    Swallowing Trials  Thin Liquid (TN0): WFL  Liquidised (LQ3): WFL  Easy to Chew (EC7): WFL    Comments: Adequate bolus acceptance and containment. Onset of swallow trigger was timely. NO overt s/sx of aspiration with trials of thin liquids (single and consecutive from cup and straw) and applesauce. Patient had delayed cough x2 following trials of easy to chew solids. Pt reported cough was consistent with smoker's cough and did not appear related to PO. Pt denied globus sensation with all trials tested. Again, pt reported globus occurs mostly when taking pills whole w/ water. Pt agreeable to floating pills. Pt had some belching with thin liquids which can be indicative of esophageal dysfunction.     Clinical Impressions  No clinical signs of oropharyngeal dysphagia; however, given reports of hx of globus cannot r/o pharyngeal residue or esophageal dysfunction. Pt recently treated for thrush which may also have contributed to globus sensation. SLP following to ensure diet tolerance and determine needs for diagnostic swallow study.       Recommendations  1. GI soft (per MD) and thin liquid diet   2.  Instrumentation: Instrumental swallow study pending clinical progress  3.  Swallowing Instructions & Precautions:   Supervision: Independent  Positioning: Fully upright and midline during oral intake  Medication: Whole with puree  Strategies: Small bites/sips, Alternate bites and sips  Oral Care: Q4h      Plan  Recommend Speech Therapy 3 times per week until therapy goals are met for the following treatments:  Dysphagia Training and Patient / Family / Caregiver Education.    Discharge Recommendations: Anticipate that the patient will have no further speech therapy needs after discharge from the hospital       08/28/22 1220   Vitals   O2 Delivery Device None - Room Air   Prior Level Of Function   Communication Within Functional  Limits   Swallow Impaired   Dentition Poor Quality    Dentures Uppers;Lowers   Hearing Within Functional Limits for Evaluation   Patient's Primary Language English   Short Term Goals   Short Term Goal # 1 Patient will consume a GI soft (per MD) diet with no overt s/sx of aspiration or c/o globus.

## 2022-08-28 NOTE — DIETARY
"Nutrition services: Day 1 of admit.  Mary Martinez is a 52 y.o. female with admitting DX of SMAS (superior mesenteric artery syndrome)    Consult received for calorie count, BMI <19, severe protein-calorie malnutrition per hospital problems list. Met with pt at bedside. Pt appeared thin with severe fat loss evidenced by hollowed orbitals, dark under eye circles, pronounced zygomatic arches; severe muscle loss evidenced by prominent brow bone. Recent dx of pancreatitis with postprandial abdominal pain.  Pt reports an improved appetite. She was able to consume most of her lunch tray. Pt does not like Boost oral nutrition supplements, though encouraged pt to drink to promote weight gain. Pt declined, though was agreeable to adding higher calorie, higher protein items to meals and/or snacks. RD will add Chobani yogurt smoothie, chocolate pudding, cottage cheese and fruit and greek yogurt to daily menu. Nutrition representative had already visited with pt to obtain meal preferences. Wt gain desired to restore mesenteric fat pad. Pt is pending G/J tube placement for supplemental nutrition.   Pt reports previous UBW of ~145 lbs, though it has been >1 year since she was at that weight. No recent wt in chart review to trend.    Assessment:  Estimated Nutritional Needs: based on:   Height: 160 cm (5' 2.99\")  Weight: 47.4 kg (104 lb 8 oz)  Weight to Use in Calculations: 47.4 kg (104 lb 8 oz)  Body mass index is 18.52 kg/m²., BMI classification: normal    Estimated kcal needs:  Calculation/Equation: MSJ x 1.4 = 1489 kcals/day - increased injury factor to promote weight gain in an effort to restore mesenteric fat pad    Total Calories / day: 1489 (Calories / k)  Total Grams Protein / day: 57+ (Grams Protein / k.2+)    Malnutrition Risk: Pt with severe, chronic malnutrition, related to postprandial abdominal pain, recent pancreatitis, and severe weight loss AEB physical markers for severe fat and severe muscle loss as " noted above.     Recommendations/Plan:  High calorie, high protein snacks.  Encourage intake of meals and snacks.  Document intake of all meals and snacks as % taken in ADLs to provide interdisciplinary communication across all shifts.   Monitor weight.  Nutrition rep will continue to see patient for ongoing meal and snack preferences.     RD will follow.

## 2022-08-28 NOTE — ASSESSMENT & PLAN NOTE
Suspected  Postprandial pain, persistent hypotension, weight loss with severe protein caloric malnutrition    CT AP: mild prominent fluid-filled proximal duodenum with decompression, suggestive of SMA syndrome    Peripherally discussed with oncall vascular surgery -- recommended reversal of weight loss by aggressive nutrition, including enteral feeding, and eventual bariatric surgery consult for GJ tube nutrition.

## 2022-08-28 NOTE — H&P
"Hospital Medicine History & Physical Note    Date of Service  8/27/2022    Primary Care Physician  Fidencio Christopher D.O.    Consultants  IR    Code Status  DNAR/DNI    Chief Complaint  Chief Complaint   Patient presents with    Trauma Red     Pt BIBA from home s/p GLF with head strike. PT states she was recently diagnosed with pancreatitis and has been vomiting persistently for \"a few days\", got dizzy, lost consciousness, and fell. No obvious trauma to patient, GCS 15, pt hypotensive SBP 80's upon EMS arrival. No blood thinners reported by pt.       History of Presenting Illness  Mary Martinez is a 52 y.o. frail underweight female with history of rheumatoid arthritis who presented 8/27/2022 with trauma evaluation.  Patient was reported to have GL F, syncopal episode.  She was hypotensive with BP of 80/64 by ER arrival.  No acute etiology seen on trauma survey-CT head, CXR, x-ray pelvis, FAST ABD US.  Patient reported no eating drinking well for more than the past 2 weeks secondary to postprandial abdominal pain.  She reported drastic weight loss over the past few months.  Her CTAP showed no acute inflammatory changes, however, mild prominent fluid-filled proximal duodenum with decompression, suggestive of SMA syndrome.  Admitted to medicine service for further evaluation and treatment.    I discussed the plan of care with patient and bedside RN.    Review of Systems  Review of Systems   Constitutional:  Positive for malaise/fatigue and weight loss. Negative for chills, diaphoresis and fever.   HENT:  Negative for hearing loss and tinnitus.    Eyes:  Negative for blurred vision and double vision.   Respiratory:  Negative for cough and shortness of breath.    Cardiovascular:  Negative for chest pain, palpitations and leg swelling.   Gastrointestinal:  Negative for abdominal pain, heartburn, nausea and vomiting.   Genitourinary:  Negative for dysuria and hematuria.   Musculoskeletal:  Positive for falls. Negative for " myalgias.   Skin:  Negative for itching and rash.   Neurological:  Positive for dizziness, loss of consciousness and weakness. Negative for focal weakness and seizures.   Endo/Heme/Allergies:  Negative for environmental allergies. Does not bruise/bleed easily.   Psychiatric/Behavioral:  Negative for depression and substance abuse.    All other systems reviewed and are negative.    Past Medical History   has no past medical history on file.  Rheumatoid arthritis    Surgical History   has no past surgical history on file.   Resection of right finger    Family History  family history is not on file.   Family history reviewed with patient. There is no family history that is pertinent to the chief complaint.     Social History  Smokes at least 10 cigarettes/day  Denies EtOH use  Denies IVDA    Allergies  Allergies   Allergen Reactions    Abilify     Nitrous Oxide     Penicillin G        Medications  None       Physical Exam  Temp:  [36.9 °C (98.5 °F)] 36.9 °C (98.5 °F)  Pulse:  [] 83  Resp:  [16-21] 16  BP: ()/(56-85) 88/65  SpO2:  [95 %-97 %] 97 %  Blood Pressure: (!) 95/70   Temperature: 36.9 °C (98.5 °F)   Pulse: 82   Respiration: 16   Pulse Oximetry: 96 %       Physical Exam  Vitals and nursing note reviewed.   Constitutional:       Appearance: She is ill-appearing.      Comments: Frail, severely underweight   HENT:      Head: Normocephalic and atraumatic.      Nose: Nose normal.      Mouth/Throat:      Mouth: Mucous membranes are dry.      Pharynx: Oropharynx is clear.   Eyes:      General: No scleral icterus.     Extraocular Movements: Extraocular movements intact.   Cardiovascular:      Rate and Rhythm: Regular rhythm. Tachycardia present.      Pulses: Normal pulses.      Heart sounds:     No friction rub.   Pulmonary:      Effort: No respiratory distress.      Breath sounds: No stridor. No wheezing or rales.   Abdominal:      General: Bowel sounds are normal. There is distension.      Palpations:  Abdomen is soft.      Tenderness: There is abdominal tenderness. There is no guarding or rebound.   Musculoskeletal:         General: No swelling or tenderness. Normal range of motion.      Cervical back: Neck supple. No tenderness.   Skin:     General: Skin is dry.      Capillary Refill: Capillary refill takes less than 2 seconds.      Coloration: Skin is pale.      Comments: Poor skin turgor   Neurological:      General: No focal deficit present.      Mental Status: She is alert and oriented to person, place, and time.   Psychiatric:         Mood and Affect: Mood normal.       Laboratory:  Recent Labs     08/27/22  1438   WBC 7.3   RBC 4.20   HEMOGLOBIN 13.1   HEMATOCRIT 39.5   MCV 94.0   MCH 31.2   MCHC 33.2*   RDW 48.1   PLATELETCT 553*   MPV 9.8     Recent Labs     08/27/22  1438   SODIUM 137   POTASSIUM 3.4*   CHLORIDE 107   CO2 15*   GLUCOSE 106*   BUN 11   CREATININE 0.86   CALCIUM 9.2     Recent Labs     08/27/22  1438   ALTSGPT 7   ASTSGOT 10*   ALKPHOSPHAT 123*   TBILIRUBIN 0.2   LIPASE 44   GLUCOSE 106*     Recent Labs     08/27/22  1438   APTT 29.8   INR 1.09     No results for input(s): NTPROBNP in the last 72 hours.      No results for input(s): TROPONINT in the last 72 hours.    Imaging:  US-ABDOMEN F.A.S.T. LTD (FOR ED USE ONLY)   Final Result      No free fluid seen in all 4 quadrants.      Negative FAST scan.            CT-ABDOMEN-PELVIS WITH   Final Result         1. No acute inflammatory change or pelvis.   2. No small bowel obstruction. Mildly prominent fluid-filled proximal duodenum with decompression distally. This is nonspecific but can be seen with SMA syndrome in thin patient.   3. Nonobstructive bilateral renal calculi      CT-HEAD W/O   Final Result         1. No acute intracranial abnormality. No evidence of acute intracranial hemorrhage or mass lesion.                     DX-PELVIS-1 OR 2 VIEWS   Final Result      1.  No acute displaced pelvic or proximal femur fracture.       DX-CHEST-LIMITED (1 VIEW)   Final Result         No acute cardiopulmonary abnormalities are identified.      EC-ECHOCARDIOGRAM COMPLETE W/O CONT    (Results Pending)       X-Ray:  I have personally reviewed the images and compared with prior images.  EKG:  I have personally reviewed the images and compared with prior images.    Assessment/Plan:  Justification for Admission Status  I anticipate this patient will require at least two midnights of hospitalization, therefore appropriate for inpatient status.        * SMAS (superior mesenteric artery syndrome) (HCC)- (present on admission)  Assessment & Plan  Suspected  Postprandial pain, persistent hypotension, weight loss with severe protein caloric malnutrition    CT AP: mild prominent fluid-filled proximal duodenum with decompression, suggestive of SMA syndrome    Peripherally discussed with oncall vascular surgery -- recommended reversal of weight loss by aggressive nutrition, including enteral feeding, and eventual bariatric surgery consult for further evaluation    Severe protein-calorie malnutrition (HCC)  Assessment & Plan  P.o. intake as tolerated  Ensure  Discussed weight gain via nutrition, agreeable to GJ tube placement, IR consulted    Syncope  Assessment & Plan  No acute etiology seen on CT head  Likely due to hypotension  F/u echo, orthostatic VS    Tobacco abuse  Assessment & Plan  Cessation counseling provided  Nicotine replacement as requested    Weakness  Assessment & Plan  PT/OT  fall    ACP (advance care planning)  Assessment & Plan  D/w pt -- DNR/DNI, agreeable to nutrition and IVF hydration    Hypokalemia  Assessment & Plan  Replace  Replace Mg    Hypotension  Assessment & Plan  IVF, improving    Underweight  Assessment & Plan  Diet intake as tolerated  Body mass index is 15.23 kg/m².          VTE prophylaxis: enoxaparin ppx

## 2022-08-28 NOTE — ASSESSMENT & PLAN NOTE
P.o. intake as tolerated  Ensure  Discussed weight gain via nutrition, agreeable to GJ tube placement, IR consulted

## 2022-08-28 NOTE — PROGRESS NOTES
2128:    Notified Dr. Cuellar regarding patient's low blood pressure and asked for parameters of when to notify provider of abnormal BP and parameters for when to give morphine and oxycodone.     Per Dr. Cuellar, hold narcotics unless systolic BP is >100.  No new orders for notifying provider of abnormal vital signs.

## 2022-08-29 ENCOUNTER — APPOINTMENT (OUTPATIENT)
Dept: CARDIOLOGY | Facility: MEDICAL CENTER | Age: 52
DRG: 393 | End: 2022-08-29
Attending: STUDENT IN AN ORGANIZED HEALTH CARE EDUCATION/TRAINING PROGRAM
Payer: MEDICAID

## 2022-08-29 ENCOUNTER — HOSPITAL ENCOUNTER (INPATIENT)
Dept: RADIOLOGY | Facility: MEDICAL CENTER | Age: 52
DRG: 393 | End: 2022-08-29
Attending: HOSPITALIST
Payer: MEDICAID

## 2022-08-29 VITALS
DIASTOLIC BLOOD PRESSURE: 62 MMHG | RESPIRATION RATE: 14 BRPM | WEIGHT: 104.5 LBS | OXYGEN SATURATION: 91 % | SYSTOLIC BLOOD PRESSURE: 92 MMHG | HEART RATE: 71 BPM | HEIGHT: 63 IN | TEMPERATURE: 98.6 F | BODY MASS INDEX: 18.52 KG/M2

## 2022-08-29 PROBLEM — R53.1 WEAKNESS: Status: RESOLVED | Noted: 2022-08-27 | Resolved: 2022-08-29

## 2022-08-29 PROBLEM — R55 SYNCOPE: Status: RESOLVED | Noted: 2022-08-27 | Resolved: 2022-08-29

## 2022-08-29 PROBLEM — M06.9 RHEUMATOID ARTHRITIS OF HAND (HCC): Status: ACTIVE | Noted: 2022-08-29

## 2022-08-29 PROBLEM — G89.4 CHRONIC PAIN SYNDROME: Status: ACTIVE | Noted: 2022-08-29

## 2022-08-29 PROBLEM — Z71.89 ACP (ADVANCE CARE PLANNING): Status: RESOLVED | Noted: 2022-08-27 | Resolved: 2022-08-29

## 2022-08-29 PROBLEM — I95.9 HYPOTENSION: Status: RESOLVED | Noted: 2022-08-27 | Resolved: 2022-08-29

## 2022-08-29 PROBLEM — E53.8 VITAMIN B12 DEFICIENCY: Status: ACTIVE | Noted: 2022-08-29

## 2022-08-29 PROBLEM — E87.6 HYPOKALEMIA: Status: RESOLVED | Noted: 2022-08-27 | Resolved: 2022-08-29

## 2022-08-29 PROBLEM — K86.1 IDIOPATHIC CHRONIC PANCREATITIS (HCC): Status: ACTIVE | Noted: 2022-08-29

## 2022-08-29 LAB
ANION GAP SERPL CALC-SCNC: 10 MMOL/L (ref 7–16)
BUN SERPL-MCNC: 6 MG/DL (ref 8–22)
CALCIUM SERPL-MCNC: 8.2 MG/DL (ref 8.5–10.5)
CHLORIDE SERPL-SCNC: 110 MMOL/L (ref 96–112)
CO2 SERPL-SCNC: 17 MMOL/L (ref 20–33)
CREAT SERPL-MCNC: 0.54 MG/DL (ref 0.5–1.4)
GFR SERPLBLD CREATININE-BSD FMLA CKD-EPI: 112 ML/MIN/1.73 M 2
GLUCOSE SERPL-MCNC: 94 MG/DL (ref 65–99)
POTASSIUM SERPL-SCNC: 4.4 MMOL/L (ref 3.6–5.5)
SODIUM SERPL-SCNC: 137 MMOL/L (ref 135–145)

## 2022-08-29 PROCEDURE — 700111 HCHG RX REV CODE 636 W/ 250 OVERRIDE (IP): Performed by: HOSPITALIST

## 2022-08-29 PROCEDURE — 700102 HCHG RX REV CODE 250 W/ 637 OVERRIDE(OP): Performed by: STUDENT IN AN ORGANIZED HEALTH CARE EDUCATION/TRAINING PROGRAM

## 2022-08-29 PROCEDURE — A9270 NON-COVERED ITEM OR SERVICE: HCPCS | Performed by: HOSPITALIST

## 2022-08-29 PROCEDURE — A9270 NON-COVERED ITEM OR SERVICE: HCPCS | Performed by: STUDENT IN AN ORGANIZED HEALTH CARE EDUCATION/TRAINING PROGRAM

## 2022-08-29 PROCEDURE — 700102 HCHG RX REV CODE 250 W/ 637 OVERRIDE(OP): Performed by: HOSPITALIST

## 2022-08-29 PROCEDURE — 99239 HOSP IP/OBS DSCHRG MGMT >30: CPT | Performed by: HOSPITALIST

## 2022-08-29 PROCEDURE — 80048 BASIC METABOLIC PNL TOTAL CA: CPT

## 2022-08-29 RX ORDER — LANOLIN ALCOHOL/MO/W.PET/CERES
100 CREAM (GRAM) TOPICAL 2 TIMES DAILY
Qty: 60 TABLET | Refills: 0 | Status: SHIPPED | OUTPATIENT
Start: 2022-08-29 | End: 2023-01-19

## 2022-08-29 RX ORDER — NICOTINE 21 MG/24HR
1 PATCH, TRANSDERMAL 24 HOURS TRANSDERMAL EVERY 24 HOURS
Qty: 30 PATCH | Refills: 0 | Status: SHIPPED | OUTPATIENT
Start: 2022-08-29 | End: 2022-09-07

## 2022-08-29 RX ORDER — FOLIC ACID 1 MG/1
1 TABLET ORAL 2 TIMES DAILY
Qty: 60 TABLET | Refills: 0 | Status: SHIPPED | OUTPATIENT
Start: 2022-08-29 | End: 2023-01-19

## 2022-08-29 RX ADMIN — CYANOCOBALAMIN TAB 500 MCG 1000 MCG: 500 TAB at 06:42

## 2022-08-29 RX ADMIN — FOLIC ACID 1 MG: 1 TABLET ORAL at 06:42

## 2022-08-29 RX ADMIN — Medication 100 MG: at 06:42

## 2022-08-29 RX ADMIN — MORPHINE SULFATE 2 MG: 4 INJECTION INTRAVENOUS at 08:00

## 2022-08-29 RX ADMIN — NICOTINE TRANSDERMAL SYSTEM 21 MG: 21 PATCH, EXTENDED RELEASE TRANSDERMAL at 06:42

## 2022-08-29 RX ADMIN — OXYCODONE HYDROCHLORIDE 10 MG: 10 TABLET ORAL at 00:34

## 2022-08-29 RX ADMIN — MORPHINE SULFATE 2 MG: 4 INJECTION INTRAVENOUS at 02:11

## 2022-08-29 RX ADMIN — OXYCODONE HYDROCHLORIDE 10 MG: 10 TABLET ORAL at 12:21

## 2022-08-29 RX ADMIN — OXYCODONE HYDROCHLORIDE 10 MG: 10 TABLET ORAL at 06:42

## 2022-08-29 RX ADMIN — DOCUSATE SODIUM 50 MG AND SENNOSIDES 8.6 MG 2 TABLET: 8.6; 5 TABLET, FILM COATED ORAL at 06:00

## 2022-08-29 ASSESSMENT — LIFESTYLE VARIABLES
DOES PATIENT WANT TO STOP DRINKING: NO
ON A TYPICAL DAY WHEN YOU DRINK ALCOHOL HOW MANY DRINKS DO YOU HAVE: 0
HOW MANY TIMES IN THE PAST YEAR HAVE YOU HAD 5 OR MORE DRINKS IN A DAY: 0
HAVE YOU EVER FELT YOU SHOULD CUT DOWN ON YOUR DRINKING: NO
EVER HAD A DRINK FIRST THING IN THE MORNING TO STEADY YOUR NERVES TO GET RID OF A HANGOVER: NO
AVERAGE NUMBER OF DAYS PER WEEK YOU HAVE A DRINK CONTAINING ALCOHOL: 0
ALCOHOL_USE: NO
TOTAL SCORE: 0
CONSUMPTION TOTAL: NEGATIVE
TOTAL SCORE: 0
TOTAL SCORE: 0
HAVE PEOPLE ANNOYED YOU BY CRITICIZING YOUR DRINKING: NO
EVER FELT BAD OR GUILTY ABOUT YOUR DRINKING: NO

## 2022-08-29 ASSESSMENT — PAIN DESCRIPTION - PAIN TYPE: TYPE: ACUTE PAIN

## 2022-08-29 NOTE — DISCHARGE SUMMARY
"Discharge Summary    CHIEF COMPLAINT ON ADMISSION  Chief Complaint   Patient presents with    Trauma Red     Pt BIBA from home s/p GLF with head strike. PT states she was recently diagnosed with pancreatitis and has been vomiting persistently for \"a few days\", got dizzy, lost consciousness, and fell. No obvious trauma to patient, GCS 15, pt hypotensive SBP 80's upon EMS arrival. No blood thinners reported by pt.       Reason for Admission  trauma red, eta 5, 52f, head strik*     Admission Date  8/27/2022    CODE STATUS  DNAR/DNI    HPI & HOSPITAL COURSE  This is a 52 y.o. female here with N/V, syncope due to hypotension.     Mary Martinez is a 52 y.o. frail underweight female with history of rheumatoid arthritis who presented 8/27/2022 with trauma evaluation.  Patient was reported to have GL F, syncopal episode.  She was hypotensive with BP of 80/64 by ER arrival.  No acute etiology seen on trauma survey-CT head, CXR, x-ray pelvis, FAST ABD US.  Patient reported no eating drinking well for more than the past 2 weeks secondary to postprandial abdominal pain.  She reported drastic weight loss over the past few months.  Her CTAP showed no acute inflammatory changes, however, mild prominent fluid-filled proximal duodenum with decompression, suggestive of SMA syndrome.       Interval Problem Update  8/28: Patient currently is tolerating clear liquid diet.  She has not had emesis since admission.  She was seen by speech-language pathologist without overt signs of aspiration.  She has been advanced to a GI soft diet to see if she tolerates.  Her blood pressure remains soft 105/71 she was started on midodrine on admission 5 mg 3 times daily.  Will dc since clinically dehydrated.  She was also given 3 L IV fluid normal saline plus potassium with improvement of her hydration.  Vitamin B12 level is low at 309 started on vitamin B12 oral.  Dr. Keller consulted on patient and recommending bariatric surgery evaluation for a " gastrojejunostomy tube.  I do not believe that IR will be able to place this.  Patient has had chronic weight loss over the last several months.  Also spoke to her about quitting smoking.  She is requesting her home oxycodone dose of 10 mg p.o. every 4 hours as needed abdominal pain.    No emesis since admission.  In fact, yesterday patient ate hamburger and consumed yogurts, protein shakes and all GI soft diet.  She was able to swallow and keep down Kdur tablets.  SLP evaluated and felt regular diet appropriate.     I spoke to Dr. Anderson, since patient states she has had EGDs done.  She agrees that if patient can tolerate an oral diet, then she does not need a G tube.  I also discussed with general surgery Dr. Bolaños who is in agreement of not placing G tube for nutrition since tolerating GI soft diet since admission.  Patient refused SBFT study. She was angry that a G tube was not placed.  She is encouraged to continue eating and follow up with scheduled EUS with Dr. Vásquez as scheduled.  Smoking cessation advised.  Continue vitamin B12, thiamine and folate supplements.    Therefore, she is discharged in good and stable condition to home with close outpatient follow-up.    The patient met 2-midnight criteria for an inpatient stay at the time of discharge.    Discharge Date  8/29/22    FOLLOW UP ITEMS POST DISCHARGE  Follow up with GI Dr. Vásquez as scheduled for endoscopic EUS.    DISCHARGE DIAGNOSES  Principal Problem:    SMAS (superior mesenteric artery syndrome) (HCC) POA: Yes  Active Problems:    Severe protein-calorie malnutrition (HCC) POA: Yes    Cachexia (HCC) POA: Yes    Tobacco abuse POA: Yes    Vitamin B12 deficiency POA: Yes    Idiopathic chronic pancreatitis (HCC) POA: Yes    Rheumatoid arthritis of hand (HCC) POA: Yes    Chronic pain syndrome POA: Yes  Resolved Problems:    Hypotension POA: Yes    Hypokalemia POA: Yes    ACP (advance care planning) POA: Yes    Weakness POA: Yes    Syncope  POA: Yes      FOLLOW UP  No future appointments.  No follow-up provider specified.    MEDICATIONS ON DISCHARGE     Medication List        START taking these medications        Instructions   cyanocobalamin 1000 MCG Tabs  Start taking on: August 30, 2022  Commonly known as: VITAMIN B12   Take 1 Tablet by mouth every day.  Dose: 1,000 mcg     folic acid 1 MG Tabs  Commonly known as: FOLVITE   Take 1 Tablet by mouth 2 times a day.  Dose: 1 mg     nicotine 21 MG/24HR Pt24  Commonly known as: NICODERM   Place 1 Patch on the skin every 24 hours.  Dose: 1 Patch     thiamine 100 MG tablet  Commonly known as: THIAMINE   Take 1 Tablet by mouth 2 times a day.  Dose: 100 mg            CONTINUE taking these medications        Instructions   Etanercept 25 MG Solr  Commonly known as: ENBREL   Inject 50 mg under the skin every 7 days. Tuesday  Dose: 50 mg     oxyCODONE immediate-release 5 MG Tabs  Commonly known as: ROXICODONE   Take 10 mg by mouth as needed for Severe Pain.  Dose: 10 mg     PARoxetine 30 MG Tabs  Commonly known as: PAXIL   Take 60 mg by mouth every day. Every evening  Dose: 60 mg     * QUEtiapine 100 MG Tabs  Commonly known as: Seroquel   Take 100 mg by mouth at bedtime. Indications: Manic-Depression, Major Depressive Disorder  Dose: 100 mg     * QUEtiapine 25 MG Tabs  Commonly known as: Seroquel   Take 25 mg by mouth 3 times a day as needed (anxiety/panic attacks).  Dose: 25 mg     sucralfate 1 GM Tabs  Commonly known as: CARAFATE   Take 1 g by mouth 4 Times a Day,Before Meals and at Bedtime.  Dose: 1 g           * This list has 2 medication(s) that are the same as other medications prescribed for you. Read the directions carefully, and ask your doctor or other care provider to review them with you.                  Allergies  Allergies   Allergen Reactions    Abilify     Nitrous Oxide     Penicillin G        DIET  Orders Placed This Encounter   Procedures    Diet NPO Restrict to: Sips with Medications      Standing Status:   Standing     Number of Occurrences:   8     Order Specific Question:   Diet NPO Restrict to:     Answer:   Sips with Medications [3]       ACTIVITY  As tolerated.  Weight bearing as tolerated    CONSULTATIONS  General surgery  GI    PROCEDURES  8/27/2022 3:04 PM     HISTORY/REASON FOR EXAM:  Bowel obstruction suspected.  History of pancreatitis. Vomiting.     TECHNIQUE/EXAM DESCRIPTION:   CT scan of the abdomen and pelvis with contrast.     Contrast-enhanced helical scanning was obtained from the diaphragmatic domes through the pubic symphysis following the bolus administration of nonionic contrast without complication.     80 mL of Omnipaque 350 nonionic contrast was administered without complication.     Low dose optimization technique was utilized for this CT exam including automated exposure control and adjustment of the mA and/or kV according to patient size.     COMPARISON: No prior studies available.     FINDINGS:  Lower Chest: No pleural or pericardial fluid..     Liver: Normal.     Spleen: Unremarkable.     Pancreas: Unremarkable.     Gallbladder: The gallbladder has been resected.     Biliary: There is no biliary dilatation.     Adrenal glands: Normal.     Kidneys: Nonobstructive bilateral renal calculi. No hydronephrosis. Bilateral kidneys are enhancing symmetrically.     Bowel: There is no bowel wall thickening or abnormal dilatation.     Mildly prominent proximal duodenum with decompression distally     Lymph nodes: No adenopathy.     Vasculature: The abdominal aorta is normal in caliber.     Peritoneum: Unremarkable without ascites.     Musculoskeletal: No aggressive bone lesions are seen.     Pelvis: No obvious abnormality seen in the pelvic organs but evaluation limited on CT.           IMPRESSION:        1. No acute inflammatory change or pelvis.  2. No small bowel obstruction. Mildly prominent fluid-filled proximal duodenum with decompression distally. This is nonspecific but can  be seen with SMA syndrome in thin patient.  3. Nonobstructive bilateral renal calculi           Exam Ended: 08/27/22  3:11 PM Last Resulted: 08/27/22  3:34 PM                LABORATORY  Lab Results   Component Value Date    SODIUM 137 08/29/2022    POTASSIUM 4.4 08/29/2022    CHLORIDE 110 08/29/2022    CO2 17 (L) 08/29/2022    GLUCOSE 94 08/29/2022    BUN 6 (L) 08/29/2022    CREATININE 0.54 08/29/2022        Lab Results   Component Value Date    WBC 10.2 08/28/2022    HEMOGLOBIN 10.0 (L) 08/28/2022    HEMATOCRIT 30.8 (L) 08/28/2022    PLATELETCT 428 08/28/2022        Total time of the discharge process exceeds 45 minutes.

## 2022-08-29 NOTE — DISCHARGE INSTRUCTIONS
Discharge Instructions    Discharged to home by car with relative. Discharged via wheelchair, hospital escort: Yes.  Special equipment needed: Not Applicable    Be sure to schedule a follow-up appointment with your primary care doctor or any specialists as instructed.     Discharge Plan:        I understand that a diet low in cholesterol, fat, and sodium is recommended for good health. Unless I have been given specific instructions below for another diet, I accept this instruction as my diet prescription.   Other diet: advance as tolerated    Special Instructions: None    -Is this patient being discharged with medication to prevent blood clots?  No    Is patient discharged on Warfarin / Coumadin?   No         Hypotension  As your heart beats, it forces blood through your body. This force is called blood pressure. If you have hypotension, you have low blood pressure. When your blood pressure is too low, you may not get enough blood to your brain or other parts of your body. This may cause you to feel weak, light-headed, have a fast heartbeat, or even pass out (faint). Low blood pressure may be harmless, or it may cause serious problems.  What are the causes?  Blood loss.  Not enough water in the body (dehydration).  Heart problems.  Hormone problems.  Pregnancy.  A very bad infection.  Not having enough of certain nutrients.  Very bad allergic reactions.  Certain medicines.  What increases the risk?  Age. The risk increases as you get older.  Conditions that affect the heart or the brain and spinal cord (central nervous system).  Taking certain medicines.  Being pregnant.  What are the signs or symptoms?  Feeling:  Weak.  Light-headed.  Dizzy.  Tired (fatigued).  Blurred vision.  Fast heartbeat.  Passing out, in very bad cases.  How is this treated?  Changing your diet. This may involve eating more salt (sodium) or drinking more water.  Taking medicines to raise your blood pressure.  Changing how much you take (the  dosage) of some of your medicines.  Wearing compression stockings. These stockings help to prevent blood clots and reduce swelling in your legs.  In some cases, you may need to go to the hospital for:  Fluid replacement. This means you will receive fluids through an IV tube.  Blood replacement. This means you will receive donated blood through an IV tube (transfusion).  Treating an infection or heart problems, if this applies.  Monitoring. You may need to be monitored while medicines that you are taking wear off.  Follow these instructions at home:  Eating and drinking    Drink enough fluids to keep your pee (urine) pale yellow.  Eat a healthy diet. Follow instructions from your doctor about what you can eat or drink. A healthy diet includes:  Fresh fruits and vegetables.  Whole grains.  Low-fat (lean) meats.  Low-fat dairy products.  Eat extra salt only as told. Do not add extra salt to your diet unless your doctor tells you to.  Eat small meals often.  Avoid standing up quickly after you eat.  Medicines  Take over-the-counter and prescription medicines only as told by your doctor.  Follow instructions from your doctor about changing how much you take of your medicines, if this applies.  Do not stop or change any of your medicines on your own.  General instructions    Wear compression stockings as told by your doctor.  Get up slowly from lying down or sitting.  Avoid hot showers and a lot of heat as told by your doctor.  Return to your normal activities as told by your doctor. Ask what activities are safe for you.  Do not use any products that contain nicotine or tobacco, such as cigarettes, e-cigarettes, and chewing tobacco. If you need help quitting, ask your doctor.  Keep all follow-up visits as told by your doctor. This is important.  Contact a doctor if:  You throw up (vomit).  You have watery poop (diarrhea).  You have a fever for more than 2-3 days.  You feel more thirsty than normal.  You feel weak and  tired.  Get help right away if:  You have chest pain.  You have a fast or uneven heartbeat.  You lose feeling (have numbness) in any part of your body.  You cannot move your arms or your legs.  You have trouble talking.  You get sweaty or feel light-headed.  You pass out.  You have trouble breathing.  You have trouble staying awake.  You feel mixed up (confused).  Summary  Hypotension is also called low blood pressure. It is when the force of blood pumping through your arteries is too weak.  Hypotension may be harmless, or it may cause serious problems.  Treatment may include changing your diet and medicines, and wearing compression stockings.  In very bad cases, you may need to go to the hospital.  This information is not intended to replace advice given to you by your health care provider. Make sure you discuss any questions you have with your health care provider.  Document Released: 03/14/2011 Document Revised: 06/13/2019 Document Reviewed: 06/13/2019  [employer] Patient Education © 2020 [employer]

## 2022-08-31 ENCOUNTER — HOSPITAL ENCOUNTER (INPATIENT)
Facility: MEDICAL CENTER | Age: 52
LOS: 2 days | DRG: 315 | End: 2022-09-02
Attending: EMERGENCY MEDICINE | Admitting: HOSPITALIST
Payer: MEDICAID

## 2022-08-31 DIAGNOSIS — G89.4 CHRONIC PAIN SYNDROME: ICD-10-CM

## 2022-08-31 DIAGNOSIS — E86.0 DEHYDRATION: ICD-10-CM

## 2022-08-31 DIAGNOSIS — R10.9 ABDOMINAL PAIN, UNSPECIFIED ABDOMINAL LOCATION: ICD-10-CM

## 2022-08-31 DIAGNOSIS — E43 PROTEIN-CALORIE MALNUTRITION, SEVERE (HCC): ICD-10-CM

## 2022-08-31 DIAGNOSIS — I95.9 HYPOTENSION, UNSPECIFIED HYPOTENSION TYPE: ICD-10-CM

## 2022-08-31 PROBLEM — I95.89 HYPOTENSION DUE TO HYPOVOLEMIA: Status: RESOLVED | Noted: 2021-02-12 | Resolved: 2022-08-31

## 2022-08-31 PROBLEM — E86.1 HYPOTENSION DUE TO HYPOVOLEMIA: Status: RESOLVED | Noted: 2021-02-12 | Resolved: 2022-08-31

## 2022-08-31 LAB
ALBUMIN SERPL BCP-MCNC: 3.4 G/DL (ref 3.2–4.9)
ALBUMIN/GLOB SERPL: 0.9 G/DL
ALP SERPL-CCNC: 124 U/L (ref 30–99)
ALT SERPL-CCNC: <5 U/L (ref 2–50)
ANION GAP SERPL CALC-SCNC: 13 MMOL/L (ref 7–16)
APPEARANCE UR: CLEAR
AST SERPL-CCNC: 8 U/L (ref 12–45)
BACTERIA #/AREA URNS HPF: ABNORMAL /HPF
BASOPHILS # BLD AUTO: 1 % (ref 0–1.8)
BASOPHILS # BLD: 0.09 K/UL (ref 0–0.12)
BILIRUB SERPL-MCNC: <0.2 MG/DL (ref 0.1–1.5)
BILIRUB UR QL STRIP.AUTO: NEGATIVE
BUN SERPL-MCNC: 6 MG/DL (ref 8–22)
CALCIUM SERPL-MCNC: 9 MG/DL (ref 8.4–10.2)
CHLORIDE SERPL-SCNC: 103 MMOL/L (ref 96–112)
CO2 SERPL-SCNC: 19 MMOL/L (ref 20–33)
COLOR UR: ABNORMAL
CORTIS SERPL-MCNC: 8.2 UG/DL (ref 0–23)
CREAT SERPL-MCNC: 0.64 MG/DL (ref 0.5–1.4)
EKG IMPRESSION: NORMAL
EOSINOPHIL # BLD AUTO: 0.16 K/UL (ref 0–0.51)
EOSINOPHIL NFR BLD: 1.8 % (ref 0–6.9)
EPI CELLS #/AREA URNS HPF: ABNORMAL /HPF
ERYTHROCYTE [DISTWIDTH] IN BLOOD BY AUTOMATED COUNT: 49.8 FL (ref 35.9–50)
GFR SERPLBLD CREATININE-BSD FMLA CKD-EPI: 107 ML/MIN/1.73 M 2
GLOBULIN SER CALC-MCNC: 3.7 G/DL (ref 1.9–3.5)
GLUCOSE SERPL-MCNC: 103 MG/DL (ref 65–99)
GLUCOSE UR STRIP.AUTO-MCNC: NEGATIVE MG/DL
HCT VFR BLD AUTO: 40.3 % (ref 37–47)
HGB BLD-MCNC: 13.3 G/DL (ref 12–16)
IMM GRANULOCYTES # BLD AUTO: 0.03 K/UL (ref 0–0.11)
IMM GRANULOCYTES NFR BLD AUTO: 0.3 % (ref 0–0.9)
KETONES UR STRIP.AUTO-MCNC: NEGATIVE MG/DL
LACTATE BLD-SCNC: 2.1 MMOL/L (ref 0.5–2)
LEUKOCYTE ESTERASE UR QL STRIP.AUTO: NEGATIVE
LIPASE SERPL-CCNC: 65 U/L (ref 7–58)
LYMPHOCYTES # BLD AUTO: 2.37 K/UL (ref 1–4.8)
LYMPHOCYTES NFR BLD: 26.7 % (ref 22–41)
MCH RBC QN AUTO: 31.2 PG (ref 27–33)
MCHC RBC AUTO-ENTMCNC: 33 G/DL (ref 33.6–35)
MCV RBC AUTO: 94.6 FL (ref 81.4–97.8)
MICRO URNS: ABNORMAL
MONOCYTES # BLD AUTO: 0.55 K/UL (ref 0–0.85)
MONOCYTES NFR BLD AUTO: 6.2 % (ref 0–13.4)
MUCOUS THREADS #/AREA URNS HPF: ABNORMAL /HPF
NEUTROPHILS # BLD AUTO: 5.68 K/UL (ref 2–7.15)
NEUTROPHILS NFR BLD: 64 % (ref 44–72)
NITRITE UR QL STRIP.AUTO: NEGATIVE
NRBC # BLD AUTO: 0 K/UL
NRBC BLD-RTO: 0 /100 WBC
PH UR STRIP.AUTO: 7.5 [PH] (ref 5–8)
PLATELET # BLD AUTO: 367 K/UL (ref 164–446)
PMV BLD AUTO: 10.8 FL (ref 9–12.9)
POTASSIUM SERPL-SCNC: 3.5 MMOL/L (ref 3.6–5.5)
PROT SERPL-MCNC: 7.1 G/DL (ref 6–8.2)
PROT UR QL STRIP: NEGATIVE MG/DL
RBC # BLD AUTO: 4.26 M/UL (ref 4.2–5.4)
RBC # URNS HPF: ABNORMAL /HPF
RBC UR QL AUTO: ABNORMAL
SODIUM SERPL-SCNC: 135 MMOL/L (ref 135–145)
SP GR UR STRIP.AUTO: <=1.005
UNIDENT CRYS URNS QL MICRO: ABNORMAL /HPF
WBC # BLD AUTO: 8.9 K/UL (ref 4.8–10.8)
WBC #/AREA URNS HPF: ABNORMAL /HPF

## 2022-08-31 PROCEDURE — 85025 COMPLETE CBC W/AUTO DIFF WBC: CPT

## 2022-08-31 PROCEDURE — 80053 COMPREHEN METABOLIC PANEL: CPT

## 2022-08-31 PROCEDURE — 83605 ASSAY OF LACTIC ACID: CPT

## 2022-08-31 PROCEDURE — 36415 COLL VENOUS BLD VENIPUNCTURE: CPT

## 2022-08-31 PROCEDURE — 700105 HCHG RX REV CODE 258: Performed by: EMERGENCY MEDICINE

## 2022-08-31 PROCEDURE — 700102 HCHG RX REV CODE 250 W/ 637 OVERRIDE(OP): Performed by: INTERNAL MEDICINE

## 2022-08-31 PROCEDURE — 82533 TOTAL CORTISOL: CPT

## 2022-08-31 PROCEDURE — A9270 NON-COVERED ITEM OR SERVICE: HCPCS | Performed by: HOSPITALIST

## 2022-08-31 PROCEDURE — 93005 ELECTROCARDIOGRAM TRACING: CPT | Performed by: EMERGENCY MEDICINE

## 2022-08-31 PROCEDURE — 99285 EMERGENCY DEPT VISIT HI MDM: CPT

## 2022-08-31 PROCEDURE — 81001 URINALYSIS AUTO W/SCOPE: CPT

## 2022-08-31 PROCEDURE — 99223 1ST HOSP IP/OBS HIGH 75: CPT | Mod: AI | Performed by: HOSPITALIST

## 2022-08-31 PROCEDURE — 94760 N-INVAS EAR/PLS OXIMETRY 1: CPT

## 2022-08-31 PROCEDURE — A9270 NON-COVERED ITEM OR SERVICE: HCPCS | Performed by: INTERNAL MEDICINE

## 2022-08-31 PROCEDURE — 83690 ASSAY OF LIPASE: CPT

## 2022-08-31 PROCEDURE — 700102 HCHG RX REV CODE 250 W/ 637 OVERRIDE(OP): Performed by: HOSPITALIST

## 2022-08-31 PROCEDURE — 700105 HCHG RX REV CODE 258: Performed by: HOSPITALIST

## 2022-08-31 PROCEDURE — 770020 HCHG ROOM/CARE - TELE (206)

## 2022-08-31 RX ORDER — OXYCODONE HYDROCHLORIDE 10 MG/1
10 TABLET ORAL EVERY 4 HOURS PRN
Status: DISCONTINUED | OUTPATIENT
Start: 2022-08-31 | End: 2022-09-02 | Stop reason: HOSPADM

## 2022-08-31 RX ORDER — PROMETHAZINE HYDROCHLORIDE 25 MG/1
12.5-25 SUPPOSITORY RECTAL EVERY 4 HOURS PRN
Status: DISCONTINUED | OUTPATIENT
Start: 2022-08-31 | End: 2022-09-02 | Stop reason: HOSPADM

## 2022-08-31 RX ORDER — POLYETHYLENE GLYCOL 3350 17 G/17G
1 POWDER, FOR SOLUTION ORAL
Status: DISCONTINUED | OUTPATIENT
Start: 2022-08-31 | End: 2022-09-02 | Stop reason: HOSPADM

## 2022-08-31 RX ORDER — GABAPENTIN 300 MG/1
300 CAPSULE ORAL 2 TIMES DAILY
Status: DISCONTINUED | OUTPATIENT
Start: 2022-08-31 | End: 2022-09-02 | Stop reason: HOSPADM

## 2022-08-31 RX ORDER — SODIUM CHLORIDE, SODIUM LACTATE, POTASSIUM CHLORIDE, CALCIUM CHLORIDE 600; 310; 30; 20 MG/100ML; MG/100ML; MG/100ML; MG/100ML
INJECTION, SOLUTION INTRAVENOUS CONTINUOUS
Status: DISCONTINUED | OUTPATIENT
Start: 2022-08-31 | End: 2022-09-01

## 2022-08-31 RX ORDER — PROMETHAZINE HYDROCHLORIDE 25 MG/1
12.5-25 TABLET ORAL EVERY 4 HOURS PRN
Status: DISCONTINUED | OUTPATIENT
Start: 2022-08-31 | End: 2022-09-02 | Stop reason: HOSPADM

## 2022-08-31 RX ORDER — SODIUM CHLORIDE 9 MG/ML
1000 INJECTION, SOLUTION INTRAVENOUS ONCE
Status: COMPLETED | OUTPATIENT
Start: 2022-08-31 | End: 2022-08-31

## 2022-08-31 RX ORDER — ONDANSETRON 2 MG/ML
4 INJECTION INTRAMUSCULAR; INTRAVENOUS EVERY 4 HOURS PRN
Status: DISCONTINUED | OUTPATIENT
Start: 2022-08-31 | End: 2022-09-02 | Stop reason: HOSPADM

## 2022-08-31 RX ORDER — PROCHLORPERAZINE EDISYLATE 5 MG/ML
5-10 INJECTION INTRAMUSCULAR; INTRAVENOUS EVERY 4 HOURS PRN
Status: DISCONTINUED | OUTPATIENT
Start: 2022-08-31 | End: 2022-09-02 | Stop reason: HOSPADM

## 2022-08-31 RX ORDER — PAROXETINE HYDROCHLORIDE 20 MG/1
60 TABLET, FILM COATED ORAL EVERY EVENING
Status: DISCONTINUED | OUTPATIENT
Start: 2022-08-31 | End: 2022-09-02 | Stop reason: HOSPADM

## 2022-08-31 RX ORDER — AMOXICILLIN 250 MG
2 CAPSULE ORAL 2 TIMES DAILY
Status: DISCONTINUED | OUTPATIENT
Start: 2022-08-31 | End: 2022-09-02 | Stop reason: HOSPADM

## 2022-08-31 RX ORDER — CYANOCOBALAMIN (VITAMIN B-12) 1000 MCG
1000 TABLET ORAL DAILY
COMMUNITY
End: 2023-01-19

## 2022-08-31 RX ORDER — QUETIAPINE FUMARATE 100 MG/1
100 TABLET, FILM COATED ORAL EVERY EVENING
Status: DISCONTINUED | OUTPATIENT
Start: 2022-08-31 | End: 2022-09-02 | Stop reason: HOSPADM

## 2022-08-31 RX ORDER — MIDODRINE HYDROCHLORIDE 5 MG/1
5 TABLET ORAL
Status: DISCONTINUED | OUTPATIENT
Start: 2022-08-31 | End: 2022-09-01

## 2022-08-31 RX ORDER — FLUCONAZOLE 100 MG/1
100-200 TABLET ORAL DAILY
COMMUNITY
End: 2022-09-07

## 2022-08-31 RX ORDER — ONDANSETRON 4 MG/1
4 TABLET, ORALLY DISINTEGRATING ORAL EVERY 4 HOURS PRN
Status: DISCONTINUED | OUTPATIENT
Start: 2022-08-31 | End: 2022-09-02 | Stop reason: HOSPADM

## 2022-08-31 RX ORDER — BISACODYL 10 MG
10 SUPPOSITORY, RECTAL RECTAL
Status: DISCONTINUED | OUTPATIENT
Start: 2022-08-31 | End: 2022-09-02 | Stop reason: HOSPADM

## 2022-08-31 RX ADMIN — MIDODRINE HYDROCHLORIDE 5 MG: 5 TABLET ORAL at 17:13

## 2022-08-31 RX ADMIN — SODIUM CHLORIDE 1000 ML: 9 INJECTION, SOLUTION INTRAVENOUS at 10:19

## 2022-08-31 RX ADMIN — MIDODRINE HYDROCHLORIDE 5 MG: 5 TABLET ORAL at 14:02

## 2022-08-31 RX ADMIN — QUETIAPINE FUMARATE 100 MG: 100 TABLET ORAL at 17:14

## 2022-08-31 RX ADMIN — PAROXETINE HYDROCHLORIDE 60 MG: 20 TABLET, FILM COATED ORAL at 17:14

## 2022-08-31 RX ADMIN — GABAPENTIN 300 MG: 300 CAPSULE ORAL at 17:13

## 2022-08-31 RX ADMIN — OXYCODONE HYDROCHLORIDE 10 MG: 10 TABLET ORAL at 21:24

## 2022-08-31 RX ADMIN — SODIUM CHLORIDE, POTASSIUM CHLORIDE, SODIUM LACTATE AND CALCIUM CHLORIDE: 600; 310; 30; 20 INJECTION, SOLUTION INTRAVENOUS at 13:49

## 2022-08-31 RX ADMIN — OXYCODONE HYDROCHLORIDE 10 MG: 10 TABLET ORAL at 17:14

## 2022-08-31 RX ADMIN — OXYCODONE HYDROCHLORIDE 10 MG: 10 TABLET ORAL at 12:32

## 2022-08-31 ASSESSMENT — PAIN DESCRIPTION - PAIN TYPE
TYPE: CHRONIC PAIN
TYPE: ACUTE PAIN
TYPE: CHRONIC PAIN

## 2022-08-31 ASSESSMENT — LIFESTYLE VARIABLES
ALCOHOL_USE: NO
ALCOHOL_USE: NO
HOW MANY TIMES IN THE PAST YEAR HAVE YOU HAD 5 OR MORE DRINKS IN A DAY: 0
EVER FELT BAD OR GUILTY ABOUT YOUR DRINKING: NO
TOTAL SCORE: 0
ON A TYPICAL DAY WHEN YOU DRINK ALCOHOL HOW MANY DRINKS DO YOU HAVE: 0
TOTAL SCORE: 0
HAVE YOU EVER FELT YOU SHOULD CUT DOWN ON YOUR DRINKING: NO
AVERAGE NUMBER OF DAYS PER WEEK YOU HAVE A DRINK CONTAINING ALCOHOL: 0
CONSUMPTION TOTAL: NEGATIVE
TOTAL SCORE: 0
EVER HAD A DRINK FIRST THING IN THE MORNING TO STEADY YOUR NERVES TO GET RID OF A HANGOVER: NO
HAVE PEOPLE ANNOYED YOU BY CRITICIZING YOUR DRINKING: NO

## 2022-08-31 ASSESSMENT — ENCOUNTER SYMPTOMS
FEVER: 0
SHORTNESS OF BREATH: 0
NAUSEA: 1
VOMITING: 1
CHILLS: 0
ABDOMINAL PAIN: 1
DIZZINESS: 1
CONSTIPATION: 1
NERVOUS/ANXIOUS: 1
WEAKNESS: 1
WEIGHT LOSS: 1

## 2022-08-31 ASSESSMENT — COGNITIVE AND FUNCTIONAL STATUS - GENERAL
SUGGESTED CMS G CODE MODIFIER MOBILITY: CJ
DAILY ACTIVITIY SCORE: 18
MOBILITY SCORE: 22
DRESSING REGULAR UPPER BODY CLOTHING: A LOT
SUGGESTED CMS G CODE MODIFIER DAILY ACTIVITY: CK
WALKING IN HOSPITAL ROOM: A LITTLE
DRESSING REGULAR LOWER BODY CLOTHING: A LOT
CLIMB 3 TO 5 STEPS WITH RAILING: A LITTLE
HELP NEEDED FOR BATHING: A LOT

## 2022-08-31 ASSESSMENT — PATIENT HEALTH QUESTIONNAIRE - PHQ9
9. THOUGHTS THAT YOU WOULD BE BETTER OFF DEAD, OR OF HURTING YOURSELF: NOT AT ALL
8. MOVING OR SPEAKING SO SLOWLY THAT OTHER PEOPLE COULD HAVE NOTICED. OR THE OPPOSITE, BEING SO FIGETY OR RESTLESS THAT YOU HAVE BEEN MOVING AROUND A LOT MORE THAN USUAL: NOT AT ALL
1. LITTLE INTEREST OR PLEASURE IN DOING THINGS: NEARLY EVERY DAY
7. TROUBLE CONCENTRATING ON THINGS, SUCH AS READING THE NEWSPAPER OR WATCHING TELEVISION: NEARLY EVERY DAY
3. TROUBLE FALLING OR STAYING ASLEEP OR SLEEPING TOO MUCH: NEARLY EVERY DAY
SUM OF ALL RESPONSES TO PHQ9 QUESTIONS 1 AND 2: 6
4. FEELING TIRED OR HAVING LITTLE ENERGY: NEARLY EVERY DAY
SUM OF ALL RESPONSES TO PHQ QUESTIONS 1-9: 18
5. POOR APPETITE OR OVEREATING: NEARLY EVERY DAY
2. FEELING DOWN, DEPRESSED, IRRITABLE, OR HOPELESS: NEARLY EVERY DAY
6. FEELING BAD ABOUT YOURSELF - OR THAT YOU ARE A FAILURE OR HAVE LET YOURSELF OR YOUR FAMILY DOWN: NOT AL ALL

## 2022-08-31 ASSESSMENT — FIBROSIS 4 INDEX
FIB4 SCORE: 0.51
FIB4 SCORE: 0.49

## 2022-08-31 NOTE — CONSULTS
Gastroenterology Consultation    Date of Service  8/31/2022    Referring Physician  Pankaj Lam M.D.    Consulting Physician  David August M.D.    Reason for Consultation  Hypotension, nausea and vomiting, epigastric pain, failure to thrive    History of Presenting Illness  52 y.o. female with chronic pain syndrome, RA, prior perforated duodenal ulcer and pancreatitis 2019 who presented 8/31/2022 with hypotension, persistent nausea and vomiting, failure to thrive, severe epigastric pain.    She has history of some abdominal pains in the past.  Had prior cholecystectomy for biliary dyskinesia.  Had perforated duodenal ulcer with pancreatitis 2019.  Had improved abdominal pain for one year post-surgery and then developed recurrent epigastric pain.  She notes that pains were initially intermittent and have since become more severe since the beginning of the year.  Has severe epigastric pain without significant radiation that is now constant but does worsen after eating.  Has fear of eating due to pain as well as has developed intermittent nausea and vomiting but really only able to tolerate minimal amounts and has lost 40 pounds of weight since the beginning of the year.  No bowel issues including diarrhea, constipation, melena, hematochezia.    She does have chronic pain and on opioids chronically.  Dose escalated June 2022 but no change in abdominal pain with opioids.    She has undergone evaluation for symptoms.  Did have EGD and EUS 2020 given dilated bile and pancreas ducts.  These were normal without obvious findings of chronic pancreatitis.  She underwent repeat EGD 6/2022 which was normal with mild gastritis and duodenitis.  Gastric and duodenal biopsies normal.  She has had multiple CT scans without findings although recent CT 8/27 showed questionable SMA syndrome without obstruction.  No prior motility studies.  Is scheduled for outpatient EUS celiac plexus block 10/2022.    Review of  Systems  Review of Systems   Constitutional:  Positive for malaise/fatigue and weight loss.   Gastrointestinal:  Positive for abdominal pain, constipation, nausea and vomiting.   Musculoskeletal:  Positive for joint pain.   Neurological:  Positive for dizziness and weakness.   Psychiatric/Behavioral:  The patient is nervous/anxious.    All other systems reviewed and are negative.    Past Medical History   has a past medical history of Allergy, Anemia, Anxiety, Arthritis, ASTHMA, Blood transfusion without reported diagnosis, Bowel habit changes, Bronchitis (last approx 2018), Chronic pain (04/24/2020), Dental disorder, Depression, GERD (gastroesophageal reflux disease), Head ache, Heart burn, Hiatus hernia syndrome, History of pancreatitis, Indigestion, Kidney disease, Pain, Pneumonia (10/2019), Psychiatric problem, Rheumatoid arthritis(714.0) (2003), and Substance abuse ([facility]).    She has no past medical history of Addisons disease ([facility]), Adrenal disorder (HCC), Arrhythmia, BRCA1 negative, BRCA1 positive, BRCA2 gene mutation positive, BRCA2 negative, Breast cancer (HCC), Cancer (HCC), Cancer of peritoneum (HCC), Cataract, CHF (congestive heart failure) (HCC), Clotting disorder (HCC), COPD (chronic obstructive pulmonary disease) (HCC), Cushings syndrome (HCC), Diabetes (HCC), Diabetic neuropathy (HCC), Glaucoma, Goiter, Heart attack (HCC), Heart murmur, HIV (human immunodeficiency virus infection) (HCC), Hyperlipidemia, Hypertension, IBD (inflammatory bowel disease), Malignant neoplasm of fallopian tube (HCC), Meningitis, Migraine, Muscle disorder, Osteoporosis, Ovarian cancer (HCC), Parathyroid disorder (HCC), Pituitary disease (HCC), Pulmonary emphysema (HCC), Seizure (HCC), Sickle cell disease (HCC), Stroke (HCC), Thyroid disease, Tuberculosis, or Urinary tract infection.    Surgical History   has a past surgical history that includes abdominal abscess drainage (9/27/2011); toe fusion (Right, 5/27/2015); bone spur  excision (5/27/2015); hammertoe correction (5/27/2015); foot reconstruction rheumatic (Left, 7/29/2015); arthrodesis (Right, 5/6/2016); fusion, pip joint, toe (Right, 8/29/2016); bone graft (Right, 8/29/2016); irrigation & debridement ortho (Right, 9/11/2016); finger amputation (Right, 9/14/2016); finger arthroplasty (Right, 4/17/2017); finger arthroplasty (Right, 6/5/2017); finger amputation (6/5/2017); pr exploratory of abdomen (N/A, 10/4/2019); tonsillectomy (1982); primary c section (1991); repeat c section (1999); repeat c section (2005); repeat c section (2008); appendectomy (2011); nicholas by laparoscopy (9/27/2011); wrist exploration (Left, 1980's); colonoscopy (2018); dental extraction(s) (2014); pr endoscopic us exam, esoph (4/29/2020); gastroscopy-endo (4/29/2020); and egd with asp/bx (4/29/2020).    Family History  family history includes Cancer in her mother; Heart Disease in her father and mother; Hypertension in her father and mother.    Social History   reports that she has been smoking cigarettes. She has a 30.00 pack-year smoking history. She has never used smokeless tobacco. She reports that she does not drink alcohol and does not use drugs.    Medications  Prior to Admission Medications   Prescriptions Last Dose Informant Patient Reported? Taking?   Cyanocobalamin (B-12) 1000 MCG Tab NEW RX Patient Yes Yes   Sig: Take 1,000 mg by mouth every day.   Etanercept (ENBREL) 25 MG Recon Soln 8/30/2022 at 0900 Patient Yes No   Sig: Inject 50 mg under the skin every 7 days. Tuesday   Naloxone (NARCAN) 4 MG/0.1ML Liquid Never Used Patient No No   Sig: One spray in one nostril for overdose and call 911.   Patient taking differently: Administer 1 Spray into affected nostril(S) as needed. One spray in one nostril for overdose and call 911.  Indications: Opioid Overdose   PARoxetine (PAXIL) 30 MG Tab 8/30/2022 at 2100 Patient Yes No   Sig: Take 60 mg by mouth every evening.   QUEtiapine (SEROQUEL) 100 MG Tab  8/30/2022 at 2100 Patient Yes No   Sig: Take 100 mg by mouth every evening.   QUEtiapine (SEROQUEL) 25 MG Tab > 2 days at Unknown Patient Yes No   Sig: Take 25 mg by mouth 2 times a day as needed for Anxiety.   fluconazole (DIFLUCAN) 100 MG Tab 8/27/2022 at STOPPED Patient's Home Pharmacy Yes Yes   Sig: Take 100-200 mg by mouth every day. Pt started on 8/23/2022 for 11 day course, pt was to take 200MG on the first day and 100MG for the next 10 days.   folic acid (FOLVITE) 1 MG Tab NEW RX Patient No No   Sig: Take 1 Tablet by mouth 2 times a day.   nicotine (NICODERM) 21 MG/24HR PATCH 24 HR NEW RX Patient No No   Sig: Place 1 Patch on the skin every 24 hours.   ondansetron (ZOFRAN ODT) 8 MG TABLET DISPERSIBLE 8/29/2022 at Unknown Patient Yes No   Sig: Take 8 mg by mouth every 8 hours as needed for Nausea.   oxyCODONE-acetaminophen (PERCOCET-10)  MG Tab 8/31/2022 at 0600 Patient No No   Sig: Take 1 Tablet by mouth every four hours as needed for Severe Pain for up to 30 days.   Patient taking differently: Take 1 Tablet by mouth 5 Times a Day.   oxyCODONE-acetaminophen (PERCOCET-10)  MG Tab DUPLICATE Patient No No   Sig: Take 1 Tablet by mouth every four hours as needed for Severe Pain for up to 30 days.   pantoprazole (PROTONIX) 40 MG Tablet Delayed Response 8/30/2022 at 2100 Patient No No   Sig: Take 1 Tablet by mouth every day.   Patient taking differently: Take 40 mg by mouth every evening.   sucralfate (CARAFATE) 1 GM Tab 8/30/2022 at 2100 Patient No No   Sig: Take 1 Tablet by mouth 4 Times a Day,Before Meals and at Bedtime.   thiamine (THIAMINE) 100 MG tablet NEW RX Patient No No   Sig: Take 1 Tablet by mouth 2 times a day.      Facility-Administered Medications: None       Current Facility-Administered Medications:     PARoxetine    QUEtiapine    senna-docusate **AND** polyethylene glycol/lytes **AND** magnesium hydroxide **AND** bisacodyl    LR    ondansetron    ondansetron    promethazine     promethazine    prochlorperazine    oxyCODONE immediate release    midodrine    Allergies  Allergies   Allergen Reactions    Penicillins Anaphylaxis and Hives     Tolerates cephalosporins; reports throat swelling with PCN    Aripiprazole Nausea     Spasms, shaking      Nitrous Oxide Vomiting    Abilify     Nitrous Oxide     Penicillin G        Physical Exam  Temp:  [36.6 °C (97.9 °F)-36.7 °C (98 °F)] 36.6 °C (97.9 °F)  Pulse:  [] 76  Resp:  [13-21] 18  BP: ()/(66-76) 98/67  SpO2:  [96 %-100 %] 100 %    Physical Exam  Vitals and nursing note reviewed.   Constitutional:       Appearance: She is underweight. She is not ill-appearing or toxic-appearing.   HENT:      Head: Normocephalic and atraumatic.      Mouth/Throat:      Mouth: Mucous membranes are dry.      Pharynx: Oropharynx is clear.   Eyes:      General: No scleral icterus.  Cardiovascular:      Rate and Rhythm: Normal rate and regular rhythm.      Pulses: Normal pulses.      Heart sounds: Normal heart sounds.   Pulmonary:      Effort: Pulmonary effort is normal. No respiratory distress.      Breath sounds: Normal breath sounds. No wheezing.   Abdominal:      General: Abdomen is flat. Bowel sounds are normal. There is no distension.      Palpations: Abdomen is soft. There is no mass.      Tenderness: There is abdominal tenderness in the epigastric area. There is no guarding or rebound.      Hernia: No hernia is present.   Musculoskeletal:      Cervical back: Normal range of motion and neck supple.      Right lower leg: No edema.      Left lower leg: No edema.   Skin:     General: Skin is warm and dry.      Coloration: Skin is not jaundiced.   Neurological:      General: No focal deficit present.      Mental Status: She is oriented to person, place, and time.   Psychiatric:         Mood and Affect: Mood normal.         Behavior: Behavior normal.       Fluids      Laboratory  Recent Labs     08/31/22  1014   WBC 8.9   RBC 4.26   HEMOGLOBIN 13.3    HEMATOCRIT 40.3   MCV 94.6   MCH 31.2   MCHC 33.0*   RDW 49.8   PLATELETCT 367   MPV 10.8     Recent Labs     08/29/22  0035 08/31/22  1047   SODIUM 137 135   POTASSIUM 4.4 3.5*   CHLORIDE 110 103   CO2 17* 19*   GLUCOSE 94 103*   BUN 6* 6*   CREATININE 0.54 0.64   CALCIUM 8.2* 9.0                     Imaging  No orders to display         Assessment/Plan  51 y/o with complicated history including RA, chronic pain syndrome, prior perforated DU and pancreatitis, prior nicholas with worsening severe epigastric pain, nausea and vomiting, weight loss and failure to thrive.  Question chronic mesenteric ischemia versus dysmotility versus narcotic bowel syndrome versus less likely SMA syndrome, chronic pancreatitis, functional dyspepsia.  Somewhat of challenge to manage.    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

## 2022-08-31 NOTE — H&P
"Hospital Medicine History & Physical Note    Date of Service  8/31/2022    Primary Care Physician  Fidencio Christopher D.O.    Consultants  GI    Specialist Names:     Code Status  Prior    Chief Complaint  Chief Complaint   Patient presents with    Hypotension     Ongoing for past week, pt was at GI office this AM and BP was low, c/o lightheadedness    Abdominal Pain     C/o pain and discomfort - chronic, pt states she has SMA syndrome; denies any n/v       History of Presenting Illness  Mary Martinez is a 52 y.o. female who presented 8/31/2022 with abdominal pain and inability to eat.  Ms. Maritnez has a past medical history of rheumatoid arthritis on Enbrel as well as chronic pain syndrome on oxycodone that was most recently admitted from 8/27/2022 through 8/29/2022 to [facility] due to nausea vomiting and hypotension.  On admission her blood pressure was 80s over 60s and she had been experiencing significant weight loss with dehydration.  She had a work-up including a CT of the abdomen pelvis with IV contrast which was suggestive of SMA syndrome though a CTA was not performed.  She has been followed by GI consultants since and has had multiple EGDs in the past.  Both general surgery and GI on her last admission recommended outpatient follow-up as she was tolerating some degree of oral intake and therefore G-tube was not indicated.  She was subsequently discharged home and apparently has not been able to tolerate effectively any p.o. since then.  She states \"I just cannot eat\".      Ms. Martinez went to the GI consultants office this morning and her blood pressure was found to be markedly low therefore they referred her to the emergency room where her blood pressures in the 80s over 50s to 60s.  Upon questioning patient states that since 2019 after her exploratory laparotomy after duodenal perforation she has had difficulty with eating and chronic abdominal pain and nausea.  She says this is much worse over " the past 2 to 3 weeks and effectively cannot tolerate almost anything orally.  She has had about a 25 to 30 pound weight loss since May of this year.    Differential diagnosis for her abdominal pain and persistent nausea in the setting of prior cholecystectomy include gastric motility problem as well as bowel ischemia.  I do not see any imaging studies for either these in our system though they could have been done as an outpatient.  GI consultants will be consulted.    I have a call out to [facility].     Review of Systems  Review of Systems   Constitutional:  Positive for weight loss. Negative for chills and fever.   Respiratory:  Negative for shortness of breath.    Cardiovascular:  Negative for chest pain.   Gastrointestinal:  Positive for abdominal pain, nausea and vomiting.   Neurological:  Positive for dizziness.   All other systems reviewed and are negative.    Past Medical History   has a past medical history of Allergy, Anemia, Anxiety, Arthritis, ASTHMA, Blood transfusion without reported diagnosis, Bowel habit changes, Bronchitis (last approx 2018), Chronic pain (04/24/2020), Dental disorder, Depression, GERD (gastroesophageal reflux disease), Head ache, Heart burn, Hiatus hernia syndrome, History of pancreatitis, Indigestion, Kidney disease, Pain, Pneumonia (10/2019), Psychiatric problem, Rheumatoid arthritis(714.0) (2003), and Substance abuse (HCC).    Surgical History   has a past surgical history that includes abdominal abscess drainage (9/27/2011); toe fusion (Right, 5/27/2015); bone spur excision (5/27/2015); hammertoe correction (5/27/2015); foot reconstruction rheumatic (Left, 7/29/2015); arthrodesis (Right, 5/6/2016); fusion, pip joint, toe (Right, 8/29/2016); bone graft (Right, 8/29/2016); irrigation & debridement ortho (Right, 9/11/2016); finger amputation (Right, 9/14/2016); finger arthroplasty (Right, 4/17/2017); finger arthroplasty (Right, 6/5/2017); finger amputation (6/5/2017); pr  exploratory of abdomen (N/A, 10/4/2019); tonsillectomy (1982); primary c section (1991); repeat c section (1999); repeat c section (2005); repeat c section (2008); appendectomy (2011); nicholas by laparoscopy (9/27/2011); wrist exploration (Left, 1980's); colonoscopy (2018); dental extraction(s) (2014); pr endoscopic us exam, esoph (4/29/2020); gastroscopy-endo (4/29/2020); and egd with asp/bx (4/29/2020).     Family History  family history includes Cancer in her mother; Heart Disease in her father and mother; Hypertension in her father and mother.   Family history reviewed with patient. There is no family history that is pertinent to the chief complaint.     Social History   reports that she has been smoking cigarettes. She has a 30.00 pack-year smoking history. She has never used smokeless tobacco. She reports that she does not drink alcohol and does not use drugs.    Allergies  Allergies   Allergen Reactions    Penicillins Anaphylaxis and Hives     Tolerates cephalosporins; reports throat swelling with PCN    Aripiprazole Nausea     Spasms, shaking      Nitrous Oxide Vomiting    Abilify     Nitrous Oxide     Penicillin G        Medications  Prior to Admission Medications   Prescriptions Last Dose Informant Patient Reported? Taking?   Cyanocobalamin (B-12) 1000 MCG Tab NEW RX Patient Yes Yes   Sig: Take 1,000 mg by mouth every day.   Etanercept (ENBREL) 25 MG Recon Soln 8/30/2022 at 0900 Patient Yes No   Sig: Inject 50 mg under the skin every 7 days. Tuesday   Naloxone (NARCAN) 4 MG/0.1ML Liquid Never Used Patient No No   Sig: One spray in one nostril for overdose and call 911.   Patient taking differently: Administer 1 Spray into affected nostril(S) as needed. One spray in one nostril for overdose and call 911.  Indications: Opioid Overdose   PARoxetine (PAXIL) 30 MG Tab 8/30/2022 at 2100 Patient Yes No   Sig: Take 60 mg by mouth every evening.   QUEtiapine (SEROQUEL) 100 MG Tab 8/30/2022 at 2100 Patient Yes No    Sig: Take 100 mg by mouth every evening.   QUEtiapine (SEROQUEL) 25 MG Tab > 2 days at Unknown Patient Yes No   Sig: Take 25 mg by mouth 2 times a day as needed for Anxiety.   folic acid (FOLVITE) 1 MG Tab NEW RX Patient No No   Sig: Take 1 Tablet by mouth 2 times a day.   nicotine (NICODERM) 21 MG/24HR PATCH 24 HR NEW RX Patient No No   Sig: Place 1 Patch on the skin every 24 hours.   ondansetron (ZOFRAN ODT) 8 MG TABLET DISPERSIBLE 8/29/2022 at Unknown Patient Yes No   Sig: Take 8 mg by mouth every 8 hours as needed for Nausea.   oxyCODONE-acetaminophen (PERCOCET-10)  MG Tab 8/31/2022 at 0600 Patient No No   Sig: Take 1 Tablet by mouth every four hours as needed for Severe Pain for up to 30 days.   Patient taking differently: Take 1 Tablet by mouth 5 Times a Day.   oxyCODONE-acetaminophen (PERCOCET-10)  MG Tab DUPLICATE Patient No No   Sig: Take 1 Tablet by mouth every four hours as needed for Severe Pain for up to 30 days.   pantoprazole (PROTONIX) 40 MG Tablet Delayed Response 8/30/2022 at 2100 Patient No No   Sig: Take 1 Tablet by mouth every day.   Patient taking differently: Take 40 mg by mouth every evening.   sucralfate (CARAFATE) 1 GM Tab 8/30/2022 at 2100 Patient No No   Sig: Take 1 Tablet by mouth 4 Times a Day,Before Meals and at Bedtime.   thiamine (THIAMINE) 100 MG tablet NEW RX Patient No No   Sig: Take 1 Tablet by mouth 2 times a day.      Facility-Administered Medications: None       Physical Exam  Temp:  [36.7 °C (98 °F)] 36.7 °C (98 °F)  Pulse:  [] 74  Resp:  [13-20] 14  BP: (83-87)/(66-76) 84/75  SpO2:  [96 %-99 %] 99 %  Blood Pressure: (!) 84/75   Temperature: 36.7 °C (98 °F)   Pulse: 74   Respiration: 14   Pulse Oximetry: 99 %       Physical Exam  Vitals and nursing note reviewed.   Constitutional:       Appearance: She is ill-appearing.      Comments: Quite thin   HENT:      Head: Normocephalic and atraumatic.      Mouth/Throat:      Mouth: Mucous membranes are dry.       Pharynx: Oropharynx is clear.   Eyes:      General: No scleral icterus.     Conjunctiva/sclera: Conjunctivae normal.   Cardiovascular:      Rate and Rhythm: Normal rate and regular rhythm.   Pulmonary:      Effort: Pulmonary effort is normal.      Breath sounds: Normal breath sounds.   Abdominal:      General: There is no distension.      Tenderness: There is no abdominal tenderness.   Musculoskeletal:      Cervical back: Normal range of motion and neck supple.      Right lower leg: No edema.      Left lower leg: No edema.   Skin:     General: Skin is warm and dry.   Neurological:      General: No focal deficit present.      Mental Status: She is alert and oriented to person, place, and time.   Psychiatric:         Mood and Affect: Mood normal.         Behavior: Behavior normal.         Thought Content: Thought content normal.         Judgment: Judgment normal.       Laboratory:  Recent Labs     08/31/22  1014   WBC 8.9   RBC 4.26   HEMOGLOBIN 13.3   HEMATOCRIT 40.3   MCV 94.6   MCH 31.2   MCHC 33.0*   RDW 49.8   PLATELETCT 367   MPV 10.8     Recent Labs     08/29/22  0035   SODIUM 137   POTASSIUM 4.4   CHLORIDE 110   CO2 17*   GLUCOSE 94   BUN 6*   CREATININE 0.54   CALCIUM 8.2*     Recent Labs     08/29/22  0035   GLUCOSE 94         No results for input(s): NTPROBNP in the last 72 hours.      No results for input(s): TROPONINT in the last 72 hours.    Imaging:  No orders to display           Assessment/Plan:  Justification for Admission Status  I anticipate this patient will require at least two midnights for appropriate medical management, necessitating inpatient admission because IV fluids and GI work-up in the setting of effective inability to eat    Patient will need a Telemetry bed on MEDICAL service .  The need is secondary to hypotension.    * Hypotension- (present on admission)  Assessment & Plan  Very low blood pressure and symptomatic in the setting of hypovolemia  Cortisol level will also be  checked.  She has been on midodrine in the past and it will be restarted.  IV fluids and admit to the telemetry floor.     Protein-calorie malnutrition, severe (HCC)- (present on admission)  Assessment & Plan  This is a clinical diagnosis present upon admission in the setting of very poor oral intake, cachectic state, and unintentional weight loss.   She went to [facility] office but was referred to the ER.  GI will be consulted.  She has had a cholecystectomy.  Consider gastroparesis as well as bowel ischemia. On last admit CT with IV contrast 8/27/22:  1. No acute inflammatory change or pelvis.  2. No small bowel obstruction. Mildly prominent fluid-filled proximal duodenum with decompression distally. This is nonspecific but can be seen with SMA syndrome in thin patient.  3. Nonobstructive bilateral renal calculi    Chronic pain syndrome- (present on admission)  Assessment & Plan  She is on outpatient oxycodone which will be continued  Refrain from an escalation especially in the setting of possible GI motility problems.     Unintentional weight loss- (present on admission)  Assessment & Plan  Due to inability to eat    Arthritis, rheumatoid (HCC)- (present on admission)  Assessment & Plan  She is on Enbrel    Tobacco dependence- (present on admission)  Assessment & Plan  Cessation discussed at length.      VTE prophylaxis: SCDs/TEDs

## 2022-08-31 NOTE — PROGRESS NOTES
Pt arrived to unit via gurney. Ambulated from [place] to bed, x standby assist. Tele monitor applied, vitals taken. Pt assessed. A&O x4. Admit profile and med rec complete. Discussed POC with pt, including pain control options, fall risk/precautions, IVF, midodrine for low BP. Communication board filled out. Questions and concerns addressed, verbalized understanding. Fall precautions in place. Pt demonstrates ability to use call light appropriately. Pt left in lowest position. Bed locked and low, bed alarm on.

## 2022-08-31 NOTE — ASSESSMENT & PLAN NOTE
-Likely due to hypovolemia.  Blood pressure trend is better, although still soft.  Cortisol level is normal.  Albumin 3.4.  -Increase midodrine to 10 mg 3 times daily.  -Continue IV fluids with NS at 125 cc/h.  -Continue to monitor hemodynamics closely.

## 2022-08-31 NOTE — ASSESSMENT & PLAN NOTE
-This is a clinical diagnosis present upon admission in the setting of very poor oral intake, cachectic state, and unintentional weight loss.   Ongoing GI issues.  -Awaiting further GI work-up.  Depending on results, may need supplemental feeding.

## 2022-08-31 NOTE — ED PROVIDER NOTES
ED Provider Note    Scribed for Jim Cornell M.D. by Heriberto Hill. 8/31/2022  9:47 AM    Primary care provider: Fidencio Christopher D.O.  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 female with a history of SMA and Rheumatoid Arthritis who presents to the Emergency Department for evaluation of upper abdominal pain, onset two days ago. She states that she has a history of SMA and she has difficulty eating and drinking secondary to this. She has previously declined g-tube placement. She was admitted to the hospital recently for similar symptoms and was discharged two days ago. She notes that since being discharged she has been experiencing the abdominal pain. She also notes associated symptoms of hypotension as she has been having difficulty taking her medications. She presented to her GI office this morning and was hypotensive, so she was instructed to present to the ED for further evaluation. She denies any fever, vomiting, or diarrhea. She has a surgical history of an exploratory laparotomy. There are no known alleviating or exacerbating factors.       REVIEW OF SYSTEMS  Pertinent positives include upper abdominal pain and hypotension.   Pertinent negatives include no fever, vomiting, or diarrhea.    All other systems reviewed and negative. See HPI for further details.       PAST MEDICAL HISTORY   has a past medical history of Allergy, Anemia, Anxiety, Arthritis, ASTHMA, Blood transfusion without reported diagnosis, Bowel habit changes, Bronchitis (last approx 2018), Chronic pain (04/24/2020), Dental disorder, Depression, GERD (gastroesophageal reflux disease),  Head ache, Heart burn, Hiatus hernia syndrome, History of pancreatitis, Indigestion, Kidney disease, Pain, Pneumonia (10/2019), Psychiatric problem, Rheumatoid arthritis(714.0) (2003), and Substance abuse (HCC).    SURGICAL HISTORY   has a past surgical history that includes abdominal abscess drainage (9/27/2011); toe fusion (Right, 5/27/2015); bone spur excision (5/27/2015); hammertoe correction (5/27/2015); foot reconstruction rheumatic (Left, 7/29/2015); arthrodesis (Right, 5/6/2016); fusion, pip joint, toe (Right, 8/29/2016); bone graft (Right, 8/29/2016); irrigation & debridement ortho (Right, 9/11/2016); finger amputation (Right, 9/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    SOCIAL HISTORY  Social History     Tobacco Use    Smoking status: Every Day     Packs/day: 1.00     Years: 30.00     Pack years: 30.00     Types: Cigarettes    Smokeless tobacco: Never   Vaping Use    Vaping Use: Never used   Substance Use Topics    Alcohol use: Never    Drug use: Never      Social History     Substance and Sexual Activity   Drug Use Never       FAMILY HISTORY  Family History   Problem Relation Age of Onset    Cancer Mother     Heart Disease Mother     Hypertension Mother     Heart Disease Father     Hypertension Father        CURRENT MEDICATIONS  Current Outpatient Medications   Medication Instructions    albuterol (VENTOLIN OR PROVENTIL) 108 (90 BASE) MCG/ACT AERS inhalation aerosol 2 Puffs, Inhalation, EVERY 4 HOURS PRN    cyanocobalamin (VITAMIN  B12) 1,000 mcg, Oral, DAILY    ENBREL 50 MG/ML Solution Prefilled Syringe INJECT 50 MG ONCE WEEKLY UNDERNEATH THE SKIN  FOR 28 DAYS    Etanercept (ENBREL) 50 mg, Subcutaneous, EVERY 7 DAYS, Tuesday    folic acid (FOLVITE) 1 mg, Oral, 2 TIMES DAILY    Naloxone (NARCAN) 4 MG/0.1ML Liquid One spray in one nostril for overdose and call 911.    nicotine (NICODERM) 21 mg, Transdermal, EVERY 24 HOURS    ondansetron (ZOFRAN ODT) 4 MG TABLET DISPERSIBLE ALLOW 1 TABLET TO DISSOLVE ON THE TONGUE EVERY 8 HOURS AS NEEDED FOR NAUSEA    ondansetron (ZOFRAN ODT) 8 mg, Oral, EVERY 8 HOURS PRN    ondansetron (ZOFRAN) 4 MG/2ML Solution injection No dose, route, or frequency recorded.    oxyCODONE immediate-release (ROXICODONE) 10 mg, Oral, PRN    oxyCODONE-acetaminophen (PERCOCET-10)  MG Tab 1 Tablet, Oral, EVERY 4 HOURS PRN    [START ON 9/4/2022] oxyCODONE-acetaminophen (PERCOCET-10)  MG Tab 1 Tablet, Oral, EVERY 4 HOURS PRN    pantoprazole (PROTONIX) 40 mg, Oral, DAILY    PARoxetine (PAXIL) 60 mg, Oral, EVERY EVENING    PARoxetine (PAXIL) 60 mg, Oral, DAILY, Every evening    promethazine (PROMETHEGAN) 25 mg, Rectal, EVERY 6 HOURS PRN    QUEtiapine (SEROQUEL) 100 mg, Oral, EVERY EVENING    QUEtiapine (SEROQUEL) 25 mg, Oral, 2 TIMES DAILY PRN    QUEtiapine (SEROQUEL) 100 mg, Oral, EVERY BEDTIME    QUEtiapine (SEROQUEL) 25 mg, Oral, 3 TIMES DAILY PRN    sucralfate (CARAFATE) 1 g, Oral, 4 TIMES DAILY - BEFORE MEALS , NIGHTLY    sucralfate (CARAFATE) 1 g, Oral, 4 TIMES DAILY - BEFORE MEALS , NIGHTLY    thiamine (THIAMINE) 100 mg, Oral, 2 TIMES DAILY           ALLERGIES  Allergies   Allergen Reactions    Penicillins Anaphylaxis and Hives     Tolerates cephalosporins; reports throat swelling with PCN    Aripiprazole Nausea     Spasms, shaking      Nitrous Oxide Vomiting    Abilify     Nitrous Oxide     Penicillin G        PHYSICAL EXAM  VITAL SIGNS: BP (!) 87/76   Pulse (!) 113   Temp 36.7 °C (98 °F) (Temporal)   Resp 20    "Ht 1.6 m (5' 3\")   Wt 43.4 kg (95 lb 10.9 oz)   LMP 09/21/2011   SpO2 96%   BMI 16.95 kg/m²     Nursing note and vitals reviewed.  Constitutional: Thin and not well nourished. Chronically ill-appearing. No distress.   HENT: Head is normocephalic and atraumatic. Oropharynx is clear and moist without exudate or erythema.   Eyes: Pupils are equal, round, and reactive to light. Conjunctiva are normal.   Cardiovascular: Normal rate and regular rhythm. No murmur heard. Normal radial pulses.  Pulmonary/Chest: Breath sounds normal. No wheezes or rales.   Abdominal: Diffuse tenderness across upper abdomen. Soft, non-distended. Normal active bowel sounds. No rebound or guarding or peritoneal signs. No palpable abdominal aortic aneurysm. No palpable hernia. No masses. No tenderness at McBurney's point.   Musculoskeletal: Amputation to right fingers, Findings consistent with rheumatoid arthritis. Extremities exhibit normal range of motion without edema or tenderness.   Neurological: Awake, alert and oriented to person, place, and time. No focal deficits noted.  Skin: Well healed exploratory laparotomy scar. Skin is warm and dry. No rash.   Psychiatric: Normal mood and affect. Appropriate for clinical situation.    DIAGNOSTIC STUDIES / PROCEDURES    EKG Interpretation  Interpreted by me as below    LABS  Results for orders placed or performed during the hospital encounter of 08/31/22   CBC WITH DIFFERENTIAL   Result Value Ref Range    WBC 8.9 4.8 - 10.8 K/uL    RBC 4.26 4.20 - 5.40 M/uL    Hemoglobin 13.3 12.0 - 16.0 g/dL    Hematocrit 40.3 37.0 - 47.0 %    MCV 94.6 81.4 - 97.8 fL    MCH 31.2 27.0 - 33.0 pg    MCHC 33.0 (L) 33.6 - 35.0 g/dL    RDW 49.8 35.9 - 50.0 fL    Platelet Count 367 164 - 446 K/uL    MPV 10.8 9.0 - 12.9 fL    Neutrophils-Polys 64.00 44.00 - 72.00 %    Lymphocytes 26.70 22.00 - 41.00 %    Monocytes 6.20 0.00 - 13.40 %    Eosinophils 1.80 0.00 - 6.90 %    Basophils 1.00 0.00 - 1.80 %    Immature " Granulocytes 0.30 0.00 - 0.90 %    Nucleated RBC 0.00 /100 WBC    Neutrophils (Absolute) 5.68 2.00 - 7.15 K/uL    Lymphs (Absolute) 2.37 1.00 - 4.80 K/uL    Monos (Absolute) 0.55 0.00 - 0.85 K/uL    Eos (Absolute) 0.16 0.00 - 0.51 K/uL    Baso (Absolute) 0.09 0.00 - 0.12 K/uL    Immature Granulocytes (abs) 0.03 0.00 - 0.11 K/uL    NRBC (Absolute) 0.00 K/uL       All labs reviewed by me.    COURSE & MEDICAL DECISION MAKING  Nursing notes, VS, PMSFHx reviewed in chart.     Review of past medical records shows the patient has a complex past medical history of rheumatoid arthritis and SMA with recent admission for similar symptoms.     9:47 AM - Patient seen and examined at bedside. Intravenous fluids were administered for dehydration. Ordered CBC with differential, CMP, Lipase, Urinalysis, Lactic acid, and EKG to evaluate her symptoms. Patient presents today with symptoms likely due to dehydration secondary to SMA.     10:45 AM I discussed the patient's case and the above findings with Dr. Lam (Hospitalist) who agrees to evaluate the patient for hospitalization.    Patient presents today with hypotension.  I feel this is related to decreased p.o. intake and some dehydration.  She is hydrated in the emergency department with good improvement of her blood pressure from a systolic in the 80s to a systolic above 100.  Laboratory studies have nonspecific findings.  Will be admitted for further evaluation and treatment.      HYDRATION: Based on the patient's presentation of Dehydration the patient was given IV fluids. IV Hydration was used because oral hydration was not adequate alone. Upon recheck following hydration, the patient was improved.    DISPOSITION:  Patient will be hospitalized by Dr. Lam in guarded condition.    FINAL IMPRESSION  1. Hypotension, unspecified hypotension type    2. Dehydration    3. Abdominal pain, unspecified abdominal location          Heriberto MOLINA am (Scribe) scribing for, and in  the presence of, Jim Cornell M.D..    Electronically signed by: Heriberto Hill (Scribe), 8/31/2022    I, Jim Cornell M.D. personally performed the services described in this documentation, as scribed by Heriberto Hill in my presence, and it is both accurate and complete.    The note accurately reflects work and decisions made by me.  Jim Cornell M.D.  8/31/2022  12:51 PM

## 2022-08-31 NOTE — ASSESSMENT & PLAN NOTE
-Continue home oxycodone.  For more severe pain, I will start her on IV morphine every 3 hours as needed.

## 2022-08-31 NOTE — ED NOTES
Med rec updated and complete, per pt and [facility]   Allergies reviewed, per pt   Pt reports she was not sure the name of her antibiotic, called [facility] @ [phone].  Pt reports that she stopped taking her antibiotic on 8/27/2022, because she was in the hospital    Pt reports that she has not started taking FOLIC ACID 1MG, NICOTINE 21MG patch, B12 1000MCG, and THIAMINE 100MG

## 2022-08-31 NOTE — PROGRESS NOTES
4 Eyes Skin Assessment Completed by LEANNE Masters and LEANNE Mcnulty.    Head WDL  Ears WDL  Nose WDL  Mouth WDL  Neck WDL  Breast/Chest WDL  Shoulder Blades WDL  Spine WDL  (R) Arm/Elbow/Hand WDL, missing fingers  (L) Arm/Elbow/Hand WDL  Abdomen WDL  Groin WDL  Scrotum/Coccyx/Buttocks WDL  (R) Leg WDL  (L) Leg WDL  (R) Heel/Foot/Toe WDL  (L) Heel/Foot/Toe WDL          Devices In Places Tele Box      Interventions In Place N/A    Possible Skin Injury No    Pictures Uploaded Into Epic N/A  Wound Consult Placed N/A  RN Wound Prevention Protocol Ordered No

## 2022-08-31 NOTE — PROGRESS NOTES
Tele strip printed at 1415     Rhythm: SR with 1*AVB   HR: 71  Measurements: 0.20/0.08/0.38        Telemetry Shift Summary     Rhythm: SR with 1*AVB  HR Range: 60's-70's  Ectopy: None     Telemetry monitoring strips placed in pt's chart.

## 2022-08-31 NOTE — ED TRIAGE NOTES
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

## 2022-09-01 ENCOUNTER — APPOINTMENT (OUTPATIENT)
Dept: RADIOLOGY | Facility: MEDICAL CENTER | Age: 52
DRG: 315 | End: 2022-09-01
Attending: INTERNAL MEDICINE
Payer: MEDICAID

## 2022-09-01 LAB
BACTERIA BLD CULT: NORMAL
BACTERIA BLD CULT: NORMAL
MAGNESIUM SERPL-MCNC: 1.8 MG/DL (ref 1.5–2.5)
SIGNIFICANT IND 70042: NORMAL
SIGNIFICANT IND 70042: NORMAL
SITE SITE: NORMAL
SITE SITE: NORMAL
SOURCE SOURCE: NORMAL
SOURCE SOURCE: NORMAL

## 2022-09-01 PROCEDURE — 99233 SBSQ HOSP IP/OBS HIGH 50: CPT | Performed by: INTERNAL MEDICINE

## 2022-09-01 PROCEDURE — 74240 X-RAY XM UPR GI TRC 1CNTRST: CPT

## 2022-09-01 PROCEDURE — 83735 ASSAY OF MAGNESIUM: CPT

## 2022-09-01 PROCEDURE — 700101 HCHG RX REV CODE 250: Performed by: INTERNAL MEDICINE

## 2022-09-01 PROCEDURE — A9270 NON-COVERED ITEM OR SERVICE: HCPCS | Performed by: INTERNAL MEDICINE

## 2022-09-01 PROCEDURE — 93976 VASCULAR STUDY: CPT

## 2022-09-01 PROCEDURE — 770020 HCHG ROOM/CARE - TELE (206)

## 2022-09-01 PROCEDURE — 700111 HCHG RX REV CODE 636 W/ 250 OVERRIDE (IP): Performed by: INTERNAL MEDICINE

## 2022-09-01 PROCEDURE — 700117 HCHG RX CONTRAST REV CODE 255: Performed by: INTERNAL MEDICINE

## 2022-09-01 PROCEDURE — 700102 HCHG RX REV CODE 250 W/ 637 OVERRIDE(OP): Performed by: INTERNAL MEDICINE

## 2022-09-01 PROCEDURE — A9270 NON-COVERED ITEM OR SERVICE: HCPCS | Performed by: HOSPITALIST

## 2022-09-01 PROCEDURE — 36415 COLL VENOUS BLD VENIPUNCTURE: CPT

## 2022-09-01 PROCEDURE — 94760 N-INVAS EAR/PLS OXIMETRY 1: CPT

## 2022-09-01 PROCEDURE — 700102 HCHG RX REV CODE 250 W/ 637 OVERRIDE(OP): Performed by: HOSPITALIST

## 2022-09-01 RX ORDER — SODIUM CHLORIDE AND POTASSIUM CHLORIDE 300; 900 MG/100ML; MG/100ML
INJECTION, SOLUTION INTRAVENOUS CONTINUOUS
Status: DISCONTINUED | OUTPATIENT
Start: 2022-09-01 | End: 2022-09-02 | Stop reason: HOSPADM

## 2022-09-01 RX ORDER — MIDODRINE HYDROCHLORIDE 5 MG/1
10 TABLET ORAL
Status: DISCONTINUED | OUTPATIENT
Start: 2022-09-01 | End: 2022-09-02 | Stop reason: HOSPADM

## 2022-09-01 RX ORDER — MORPHINE SULFATE 4 MG/ML
4 INJECTION INTRAVENOUS
Status: DISCONTINUED | OUTPATIENT
Start: 2022-09-01 | End: 2022-09-02 | Stop reason: HOSPADM

## 2022-09-01 RX ORDER — HEPARIN SODIUM 5000 [USP'U]/ML
5000 INJECTION, SOLUTION INTRAVENOUS; SUBCUTANEOUS EVERY 8 HOURS
Status: DISCONTINUED | OUTPATIENT
Start: 2022-09-01 | End: 2022-09-02 | Stop reason: HOSPADM

## 2022-09-01 RX ADMIN — MORPHINE SULFATE 4 MG: 4 INJECTION INTRAVENOUS at 18:01

## 2022-09-01 RX ADMIN — MORPHINE SULFATE 4 MG: 4 INJECTION INTRAVENOUS at 09:30

## 2022-09-01 RX ADMIN — PAROXETINE HYDROCHLORIDE 60 MG: 20 TABLET, FILM COATED ORAL at 18:01

## 2022-09-01 RX ADMIN — HEPARIN SODIUM 5000 UNITS: 5000 INJECTION, SOLUTION INTRAVENOUS; SUBCUTANEOUS at 21:38

## 2022-09-01 RX ADMIN — POTASSIUM CHLORIDE AND SODIUM CHLORIDE: 900; 300 INJECTION, SOLUTION INTRAVENOUS at 08:47

## 2022-09-01 RX ADMIN — MIDODRINE HYDROCHLORIDE 10 MG: 5 TABLET ORAL at 12:47

## 2022-09-01 RX ADMIN — IOHEXOL 100 ML: 350 INJECTION, SOLUTION INTRAVENOUS at 11:25

## 2022-09-01 RX ADMIN — MORPHINE SULFATE 4 MG: 4 INJECTION INTRAVENOUS at 13:44

## 2022-09-01 RX ADMIN — QUETIAPINE FUMARATE 100 MG: 100 TABLET ORAL at 18:01

## 2022-09-01 RX ADMIN — OXYCODONE HYDROCHLORIDE 10 MG: 10 TABLET ORAL at 01:31

## 2022-09-01 RX ADMIN — MIDODRINE HYDROCHLORIDE 10 MG: 5 TABLET ORAL at 18:01

## 2022-09-01 RX ADMIN — OXYCODONE HYDROCHLORIDE 10 MG: 10 TABLET ORAL at 06:05

## 2022-09-01 RX ADMIN — HEPARIN SODIUM 5000 UNITS: 5000 INJECTION, SOLUTION INTRAVENOUS; SUBCUTANEOUS at 09:29

## 2022-09-01 RX ADMIN — POTASSIUM CHLORIDE AND SODIUM CHLORIDE: 900; 300 INJECTION, SOLUTION INTRAVENOUS at 18:31

## 2022-09-01 RX ADMIN — GABAPENTIN 300 MG: 300 CAPSULE ORAL at 06:05

## 2022-09-01 RX ADMIN — GABAPENTIN 300 MG: 300 CAPSULE ORAL at 18:01

## 2022-09-01 RX ADMIN — POTASSIUM CHLORIDE AND SODIUM CHLORIDE: 900; 300 INJECTION, SOLUTION INTRAVENOUS at 08:44

## 2022-09-01 RX ADMIN — MIDODRINE HYDROCHLORIDE 5 MG: 5 TABLET ORAL at 07:46

## 2022-09-01 RX ADMIN — MORPHINE SULFATE 4 MG: 4 INJECTION INTRAVENOUS at 22:18

## 2022-09-01 RX ADMIN — HEPARIN SODIUM 5000 UNITS: 5000 INJECTION, SOLUTION INTRAVENOUS; SUBCUTANEOUS at 18:01

## 2022-09-01 ASSESSMENT — PAIN DESCRIPTION - PAIN TYPE
TYPE: CHRONIC PAIN
TYPE: ACUTE PAIN
TYPE: CHRONIC PAIN
TYPE: ACUTE PAIN;CHRONIC PAIN
TYPE: CHRONIC PAIN
TYPE: CHRONIC PAIN
TYPE: ACUTE PAIN
TYPE: ACUTE PAIN
TYPE: CHRONIC PAIN
TYPE: CHRONIC PAIN

## 2022-09-01 ASSESSMENT — ENCOUNTER SYMPTOMS
ABDOMINAL PAIN: 1
NAUSEA: 1
VOMITING: 0
CHILLS: 0
FEVER: 0
WEIGHT LOSS: 1
CONSTIPATION: 1
SHORTNESS OF BREATH: 0

## 2022-09-01 NOTE — PROGRESS NOTES
Bedside report received from LEANNE Montoya. Assumed pt care. Pt is AOx4, reports not sleeping well. Reports pain 9/10 in middle, upper abdominal area, repositioned and heat packs applied. Denies any other distress at this time. Discussed POC including pain control options, NPO status, pending u/s and follow through study, fall risk/precautions. Pt verbalized understanding. Hourly rounding in place. Fall precautions in place and call light within reach.

## 2022-09-01 NOTE — ASSESSMENT & PLAN NOTE
-Differentials include chronic mesenteric ischemia, dysmotility, narcotic bowel syndrome, SMA syndrome, chronic pancreatitis, functional dyspepsia.  -Awaiting further GI work-up.  Pending SBFT, and mesenteric artery ultrasound.  Depending on results, may need EUS celiac plexus block, along with supplemental feeding.  GI following.

## 2022-09-01 NOTE — PROGRESS NOTES
Hospital Medicine Daily Progress Note    Date of Service  9/1/2022    Chief Complaint  Abdominal pain, intractable nausea and vomiting    Hospital Course  Mary Martinez is a 52 y.o. female with asthma, rheumatoid arthritis on Enbrel, chronic pain on oxycodone, history of expiratory laparotomy after duodenal perforation (2019), and prior cholecystectomy who was recently admitted at [facility] for nausea, vomiting, and hypotension, with significant weight loss with dehydration.  Work-up at that time with CT of the abdomen/pelvis suggestive of SMA syndrome though a CTA was not performed.  She has been followed by GI consultants since and has had multiple EGDs in the past.  Both general surgery and GI on her last admission recommended outpatient follow-up as she was tolerating some degree of oral intake, and therefore G-tube was not indicated.  However, she is now admitted 8/31/2022 with she has not been able to tolerate effectively any p.o. since.  Her blood pressure was also low in the 80s/50-60s.  No leukocytosis.  Electrolytes and renal function are normal.  She was felt to be hypovolemic.  She is placed on IV fluids.  GI was consulted who recommended US Doppler of mesenteric vessels, upper GI SBFT.  She started on Neurontin 300 mg twice daily.  GI recommended inpatient EUS with celiac plexus block.    Interval Problem Update  9/1/2022 - I reviewed the patient's chart. There were no significant overnight events. BP soft. Afebrile. Stable on RA.  Potassium 3.5.  Lactic acid of 2.1.  Lipase 65.  Urinalysis showed no pyuria.  Cortisol is 8.2.  Albumin 3.4.    > I have personally seen and examined the patient today.  She is complaining of pain on the epigastric area, described as sharp stabbing, rated 8-9 out of 10.  She states that overall analgesics controlling her pain well.  She denies any nausea or vomiting since arriving last night.  No other complaints.      I have discussed this patient's plan of  care and discharge plan at IDT rounds today with Case Management, Nursing, Nursing leadership, and other members of the IDT team.      Consultants/Specialty  GI    Code Status  Full Code    Disposition  Patient is not medically cleared for discharge.   Anticipate discharge to to home with close outpatient follow-up.  I have placed the appropriate orders for post-discharge needs.    Review of Systems  ROS     Pertinent positives/negatives as mentioned above.     A complete review of systems was personally done by me. All other systems were negative.       Physical Exam  Temp:  [36.6 °C (97.9 °F)-37.1 °C (98.7 °F)] 37.1 °C (98.7 °F)  Pulse:  [55-82] 70  Resp:  [14-21] 14  BP: ()/(49-68) 87/58  SpO2:  [94 %-100 %] 95 %    Physical Exam  Vitals reviewed.   Constitutional:       General: She is not in acute distress.     Appearance: Normal appearance. She is normal weight. She is not ill-appearing or diaphoretic.   HENT:      Head: Normocephalic and atraumatic.      Right Ear: External ear normal.      Left Ear: External ear normal.      Mouth/Throat:      Mouth: Mucous membranes are moist.      Pharynx: No oropharyngeal exudate or posterior oropharyngeal erythema.   Eyes:      General: No scleral icterus.     Extraocular Movements: Extraocular movements intact.      Conjunctiva/sclera: Conjunctivae normal.      Pupils: Pupils are equal, round, and reactive to light.   Cardiovascular:      Rate and Rhythm: Normal rate and regular rhythm.      Heart sounds: Normal heart sounds. No murmur heard.  Pulmonary:      Effort: Pulmonary effort is normal. No respiratory distress.      Breath sounds: Normal breath sounds. No stridor. No wheezing, rhonchi or rales.   Chest:      Chest wall: No tenderness.   Abdominal:      General: Bowel sounds are normal. There is no distension.      Palpations: Abdomen is soft. There is no mass.      Tenderness: There is abdominal tenderness (epigastrium). There is no guarding or rebound.    Musculoskeletal:         General: No swelling. Normal range of motion.      Cervical back: Normal range of motion and neck supple. No rigidity. No muscular tenderness.      Right lower leg: No edema.      Left lower leg: No edema.   Lymphadenopathy:      Cervical: No cervical adenopathy.   Skin:     General: Skin is warm and dry.      Coloration: Skin is not jaundiced.      Findings: No rash.   Neurological:      General: No focal deficit present.      Mental Status: She is alert and oriented to person, place, and time. Mental status is at baseline.      Cranial Nerves: No cranial nerve deficit.   Psychiatric:         Mood and Affect: Mood normal.         Behavior: Behavior normal.         Thought Content: Thought content normal.         Judgment: Judgment normal.       Fluids    Intake/Output Summary (Last 24 hours) at 9/1/2022 1138  Last data filed at 8/31/2022 1844  Gross per 24 hour   Intake 250 ml   Output --   Net 250 ml       Laboratory  Recent Labs     08/31/22  1014   WBC 8.9   RBC 4.26   HEMOGLOBIN 13.3   HEMATOCRIT 40.3   MCV 94.6   MCH 31.2   MCHC 33.0*   RDW 49.8   PLATELETCT 367   MPV 10.8     Recent Labs     08/31/22  1047   SODIUM 135   POTASSIUM 3.5*   CHLORIDE 103   CO2 19*   GLUCOSE 103*   BUN 6*   CREATININE 0.64   CALCIUM 9.0                   Imaging  US-MESENTERIC ARTERIAL    (Results Pending)   DX-UPPER GI-SMALL BOWEL FOLLOW THRU    (Results Pending)        Assessment/Plan  * Hypotension- (present on admission)  Assessment & Plan  -Likely due to hypovolemia.  Blood pressure trend is better, although still soft.  Cortisol level is normal.  Albumin 3.4.  -Increase midodrine to 10 mg 3 times daily.  -Continue IV fluids with NS at 125 cc/h.  -Continue to monitor hemodynamics closely.    Epigastric abdominal pain- (present on admission)  Assessment & Plan  -Differentials include chronic mesenteric ischemia, dysmotility, narcotic bowel syndrome, SMA syndrome, chronic pancreatitis, functional  dyspepsia.  -Awaiting further GI work-up.  Pending SBFT, and mesenteric artery ultrasound.  Depending on results, may need EUS celiac plexus block, along with supplemental feeding.  GI following.    Protein-calorie malnutrition, severe (HCC)- (present on admission)  Assessment & Plan  -This is a clinical diagnosis present upon admission in the setting of very poor oral intake, cachectic state, and unintentional weight loss.   Ongoing GI issues.  -Awaiting further GI work-up.  Depending on results, may need supplemental feeding.     Unintentional weight loss- (present on admission)  Assessment & Plan  -Due to inability to eat    Hypokalemia- (present on admission)  Assessment & Plan  - Start potassium replacement with her IV fluids, with NS +40 mEq of KCl.  Check magnesium level.  -Repeat BMP in the morning.    Chronic pain syndrome- (present on admission)  Assessment & Plan  -Continue home oxycodone.  For more severe pain, I will start her on IV morphine every 3 hours as needed.    Arthritis, rheumatoid (HCC)- (present on admission)  Assessment & Plan  -She is on Enbrel, resume as outpatient.    Tobacco dependence- (present on admission)  Assessment & Plan  -Cessation discussed at length.       VTE prophylaxis: heparin ppx

## 2022-09-01 NOTE — CARE PLAN
The patient is Stable - Low risk of patient condition declining or worsening    Shift Goals  Clinical Goals: control pain, raise BP  Patient Goals: pain control    Progress made toward(s) clinical / shift goals:  Medicated for pain per MAR, heat packs provided. Medicated for low BP per MAR, BP stable at this time.     Patient is not progressing towards the following goals: n/a

## 2022-09-01 NOTE — ASSESSMENT & PLAN NOTE
- Start potassium replacement with her IV fluids, with NS +40 mEq of KCl.  Check magnesium level.  -Repeat BMP in the morning.

## 2022-09-01 NOTE — PROGRESS NOTES
Telemetry Shift Summary    Rhythm SR  HR Range 66-84  Ectopy rpvc, rpac  Measurements .20/.08/.36        Normal Values  Rhythm SR  HR Range    Measurements 0.12-0.20 / 0.06-0.10  / 0.30-0.52

## 2022-09-01 NOTE — CARE PLAN
The patient is Stable - Low risk of patient condition declining or worsening    Shift Goals  Clinical Goals: Maintain BP, GI studies, pain control  Patient Goals: control pain, eat more    Progress made toward(s) clinical / shift goals:  GI studies performed. Medicated for pain per MAR, pt reports some pain relief from new medication. Medicated for BP per MAR, BP remains stable.    Patient is not progressing towards the following goals: n/a

## 2022-09-01 NOTE — CARE PLAN
The patient is Stable - Low risk of patient condition declining or worsening    Shift Goals  Clinical Goals: monitor BP, UGI SBFT in AM  Patient Goals: Pain control, rest  Family Goals: NAIN    Progress made toward(s) clinical / shift goals:    Problem: Knowledge Deficit - Standard  Goal: Patient and family/care givers will demonstrate understanding of plan of care, disease process/condition, diagnostic tests and medications  Outcome: Progressing: pt verbalized understanding of POC     Problem: Fall Risk  Goal: Patient will remain free from falls  Outcome: Progressing: pt remained free from falls throughout shift       Patient is not progressing towards the following goals:      Problem: Pain - Standard  Goal: Alleviation of pain or a reduction in pain to the patient’s comfort goal  Outcome: Not Progressing: pt pain uncontrolled with medication

## 2022-09-01 NOTE — PROGRESS NOTES
Gastroenterology Progress Note     Author: David August M.D.   Date & Time Created: 9/1/2022 9:49 AM    Chief Complaint:  Epigastric pain, nausea and vomiting, failure tot thrive, hypotension    History of Presenting Illness  52 y.o. female with chronic pain syndrome, RA, prior perforated duodenal ulcer and pancreatitis 2019 who presented 8/31/2022 with hypotension, persistent nausea and vomiting, failure to thrive, severe epigastric pain.     She has history of some abdominal pains in the past.  Had prior cholecystectomy for biliary dyskinesia.  Had perforated duodenal ulcer with pancreatitis 2019.  Had improved abdominal pain for one year post-surgery and then developed recurrent epigastric pain.  She notes that pains were initially intermittent and have since become more severe since the beginning of the year.  Has severe epigastric pain without significant radiation that is now constant but does worsen after eating.  Has fear of eating due to pain as well as has developed intermittent nausea and vomiting but really only able to tolerate minimal amounts and has lost 40 pounds of weight since the beginning of the year.  No bowel issues including diarrhea, constipation, melena, hematochezia.     She does have chronic pain and on opioids chronically.  Dose escalated June 2022 but no change in abdominal pain with opioids.     She has undergone evaluation for symptoms.  Did have EGD and EUS 2020 given dilated bile and pancreas ducts.  These were normal without obvious findings of chronic pancreatitis.  She underwent repeat EGD 6/2022 which was normal with mild gastritis and duodenitis.  Gastric and duodenal biopsies normal.  She has had multiple CT scans without findings although recent CT 8/27 showed questionable SMA syndrome without obstruction.  No prior motility studies.  Is scheduled for outpatient EUS celiac plexus block 10/2022.    Interval History:  9/1/2022: No changes overnight.  Awaiting  additional studies including US Doppler mesenteric vessels and UGI SBFT.  Cortisol slightly low but normal    Review of Systems:  Review of Systems   Constitutional:  Positive for malaise/fatigue and weight loss. Negative for chills and fever.   Respiratory:  Negative for shortness of breath.    Cardiovascular:  Negative for chest pain.   Gastrointestinal:  Positive for abdominal pain, constipation and nausea. Negative for vomiting.     Physical Exam:  Physical Exam  Vitals and nursing note reviewed.   Constitutional:       Appearance: She is underweight.   Cardiovascular:      Rate and Rhythm: Normal rate and regular rhythm.   Pulmonary:      Effort: Pulmonary effort is normal. No respiratory distress.      Breath sounds: Normal breath sounds. No wheezing.   Abdominal:      General: Abdomen is flat. Bowel sounds are normal. There is no distension.      Palpations: Abdomen is soft. There is no mass.      Tenderness: There is abdominal tenderness in the epigastric area. There is no guarding or rebound.      Hernia: No hernia is present.       Labs:          Recent Labs     22  1047 22  0808   SODIUM 135  --    POTASSIUM 3.5*  --    CHLORIDE 103  --    CO2 19*  --    BUN 6*  --    CREATININE 0.64  --    MAGNESIUM  --  1.8   CALCIUM 9.0  --      Recent Labs     22  1047   ALTSGPT <5   ASTSGOT 8*   ALKPHOSPHAT 124*   TBILIRUBIN <0.2   LIPASE 65*   GLUCOSE 103*     Recent Labs     22  1014   RBC 4.26   HEMOGLOBIN 13.3   HEMATOCRIT 40.3   PLATELETCT 367     Recent Labs     22  1014 22  1047   WBC 8.9  --    NEUTSPOLYS 64.00  --    LYMPHOCYTES 26.70  --    MONOCYTES 6.20  --    EOSINOPHILS 1.80  --    BASOPHILS 1.00  --    ASTSGOT  --  8*   ALTSGPT  --  <5   ALKPHOSPHAT  --  124*   TBILIRUBIN  --  <0.2     Hemodynamics:  Temp (24hrs), Av.8 °C (98.3 °F), Min:36.6 °C (97.9 °F), Max:37.1 °C (98.7 °F)  Temperature: 37.1 °C (98.7 °F)  Pulse  Av.1  Min: 55  Max: 125   Blood Pressure:  (Abnormal) 87/58 (rn notified)     Respiratory:    Respiration: 14, Pulse Oximetry: 95 %        RUL Breath Sounds: Clear, RML Breath Sounds: Diminished, RLL Breath Sounds: Diminished, NIXON Breath Sounds: Clear, LLL Breath Sounds: Diminished  Fluids:    Intake/Output Summary (Last 24 hours) at 9/1/2022 0949  Last data filed at 8/31/2022 1844  Gross per 24 hour   Intake 250 ml   Output no documentation   Net 250 ml     Weight: 45.9 kg (101 lb 3.1 oz)  GI/Nutrition:  Orders Placed This Encounter   Procedures    Diet Order Diet: Low Fiber(GI Soft); Nutrient modifications: (optional): Low Fat     Standing Status:   Standing     Number of Occurrences:   1     Order Specific Question:   Diet:     Answer:   Low Fiber(GI Soft) [2]     Order Specific Question:   Nutrient modifications: (optional)     Answer:   Low Fat [5]     Medical Decision Making, by Problem:  Active Hospital Problems    Diagnosis     *Hypotension [I95.9]     Protein-calorie malnutrition, severe (HCC) [E43]     Chronic pain syndrome [G89.4]     Unintentional weight loss [R63.4]     Hypokalemia [E87.6]     Arthritis, rheumatoid (HCC) [M06.9]     Tobacco dependence [F17.200]      Assessment/Plan  53 y/o with complicated history including RA, chronic pain syndrome, prior perforated DU and pancreatitis, prior nicholas with worsening severe epigastric pain, nausea and vomiting, weight loss and failure to thrive.  Question chronic mesenteric ischemia versus dysmotility versus narcotic bowel syndrome versus less likely SMA syndrome, chronic pancreatitis, functional dyspepsia.  Somewhat of challenge to manage.     PROBLEMS:  Hypotension from dehydration  Persistent nausea and vomiting  Severe epigastric pain  Failure to thrive  Abnormal CT scan showing possible SMA syndrome  Chronic pain syndrome  Rheumatoid arthritis  Prior perforated duodenal ulcer and pancreatitis  Anxiety/depression    Plan:  Await US Doppler mesenteric vessels and UGI SBFT  Continue  gabapentin  Depending on additional studies, consider EUS celiac plexus block as well as potential enteral nutrition    Quality-Core Measures

## 2022-09-01 NOTE — PROGRESS NOTES
Tele strip printed at 1116     Rhythm: SR   HR: 63  Measurements: 0.20/0.10/0.40        Telemetry Shift Summary     Rhythm: SR/SB  HR Range: 49-80  Ectopy: Rare PVC/PAC     Telemetry monitoring strips placed in pt's chart.

## 2022-09-02 ENCOUNTER — PATIENT OUTREACH (OUTPATIENT)
Dept: HEALTH INFORMATION MANAGEMENT | Facility: OTHER | Age: 52
End: 2022-09-02
Payer: MEDICAID

## 2022-09-02 VITALS
WEIGHT: 100.75 LBS | TEMPERATURE: 98.7 F | SYSTOLIC BLOOD PRESSURE: 91 MMHG | BODY MASS INDEX: 17.85 KG/M2 | OXYGEN SATURATION: 95 % | RESPIRATION RATE: 16 BRPM | HEART RATE: 64 BPM | DIASTOLIC BLOOD PRESSURE: 60 MMHG | HEIGHT: 63 IN

## 2022-09-02 LAB
ANION GAP SERPL CALC-SCNC: 9 MMOL/L (ref 7–16)
BUN SERPL-MCNC: 4 MG/DL (ref 8–22)
CALCIUM SERPL-MCNC: 8.3 MG/DL (ref 8.4–10.2)
CHLORIDE SERPL-SCNC: 109 MMOL/L (ref 96–112)
CO2 SERPL-SCNC: 18 MMOL/L (ref 20–33)
CREAT SERPL-MCNC: 0.6 MG/DL (ref 0.5–1.4)
ERYTHROCYTE [DISTWIDTH] IN BLOOD BY AUTOMATED COUNT: 51.9 FL (ref 35.9–50)
GFR SERPLBLD CREATININE-BSD FMLA CKD-EPI: 109 ML/MIN/1.73 M 2
GLUCOSE SERPL-MCNC: 99 MG/DL (ref 65–99)
HCT VFR BLD AUTO: 30.9 % (ref 37–47)
HGB BLD-MCNC: 9.8 G/DL (ref 12–16)
MCH RBC QN AUTO: 31.2 PG (ref 27–33)
MCHC RBC AUTO-ENTMCNC: 31.7 G/DL (ref 33.6–35)
MCV RBC AUTO: 98.4 FL (ref 81.4–97.8)
PLATELET # BLD AUTO: 295 K/UL (ref 164–446)
PMV BLD AUTO: 11 FL (ref 9–12.9)
POTASSIUM SERPL-SCNC: 4.1 MMOL/L (ref 3.6–5.5)
RBC # BLD AUTO: 3.14 M/UL (ref 4.2–5.4)
SODIUM SERPL-SCNC: 136 MMOL/L (ref 135–145)
WBC # BLD AUTO: 7.2 K/UL (ref 4.8–10.8)

## 2022-09-02 PROCEDURE — 700111 HCHG RX REV CODE 636 W/ 250 OVERRIDE (IP): Performed by: INTERNAL MEDICINE

## 2022-09-02 PROCEDURE — 700102 HCHG RX REV CODE 250 W/ 637 OVERRIDE(OP): Performed by: INTERNAL MEDICINE

## 2022-09-02 PROCEDURE — 94760 N-INVAS EAR/PLS OXIMETRY 1: CPT

## 2022-09-02 PROCEDURE — 85027 COMPLETE CBC AUTOMATED: CPT

## 2022-09-02 PROCEDURE — 36415 COLL VENOUS BLD VENIPUNCTURE: CPT

## 2022-09-02 PROCEDURE — 80048 BASIC METABOLIC PNL TOTAL CA: CPT

## 2022-09-02 PROCEDURE — A9270 NON-COVERED ITEM OR SERVICE: HCPCS | Performed by: INTERNAL MEDICINE

## 2022-09-02 PROCEDURE — 700101 HCHG RX REV CODE 250: Performed by: INTERNAL MEDICINE

## 2022-09-02 PROCEDURE — 99239 HOSP IP/OBS DSCHRG MGMT >30: CPT | Performed by: INTERNAL MEDICINE

## 2022-09-02 RX ORDER — MIDODRINE HYDROCHLORIDE 10 MG/1
10 TABLET ORAL
Qty: 90 TABLET | Refills: 0 | Status: SHIPPED | OUTPATIENT
Start: 2022-09-02 | End: 2023-01-19

## 2022-09-02 RX ORDER — GABAPENTIN 300 MG/1
300 CAPSULE ORAL 2 TIMES DAILY
Qty: 60 CAPSULE | Refills: 0 | Status: SHIPPED | OUTPATIENT
Start: 2022-09-02 | End: 2022-09-28 | Stop reason: SDUPTHER

## 2022-09-02 RX ADMIN — MIDODRINE HYDROCHLORIDE 10 MG: 5 TABLET ORAL at 07:27

## 2022-09-02 RX ADMIN — MORPHINE SULFATE 4 MG: 4 INJECTION INTRAVENOUS at 09:43

## 2022-09-02 RX ADMIN — MORPHINE SULFATE 4 MG: 4 INJECTION INTRAVENOUS at 01:45

## 2022-09-02 RX ADMIN — MORPHINE SULFATE 4 MG: 4 INJECTION INTRAVENOUS at 05:50

## 2022-09-02 RX ADMIN — GABAPENTIN 300 MG: 300 CAPSULE ORAL at 05:51

## 2022-09-02 RX ADMIN — POTASSIUM CHLORIDE AND SODIUM CHLORIDE: 900; 300 INJECTION, SOLUTION INTRAVENOUS at 03:06

## 2022-09-02 RX ADMIN — HEPARIN SODIUM 5000 UNITS: 5000 INJECTION, SOLUTION INTRAVENOUS; SUBCUTANEOUS at 05:50

## 2022-09-02 SDOH — ECONOMIC STABILITY: TRANSPORTATION INSECURITY
IN THE PAST 12 MONTHS, HAS LACK OF TRANSPORTATION KEPT YOU FROM MEETINGS, WORK, OR FROM GETTING THINGS NEEDED FOR DAILY LIVING?: NO

## 2022-09-02 SDOH — ECONOMIC STABILITY: FOOD INSECURITY: WITHIN THE PAST 12 MONTHS, YOU WORRIED THAT YOUR FOOD WOULD RUN OUT BEFORE YOU GOT MONEY TO BUY MORE.: NEVER TRUE

## 2022-09-02 SDOH — ECONOMIC STABILITY: FOOD INSECURITY: WITHIN THE PAST 12 MONTHS, THE FOOD YOU BOUGHT JUST DIDN'T LAST AND YOU DIDN'T HAVE MONEY TO GET MORE.: NEVER TRUE

## 2022-09-02 SDOH — ECONOMIC STABILITY: TRANSPORTATION INSECURITY
IN THE PAST 12 MONTHS, HAS THE LACK OF TRANSPORTATION KEPT YOU FROM MEDICAL APPOINTMENTS OR FROM GETTING MEDICATIONS?: NO

## 2022-09-02 SDOH — ECONOMIC STABILITY: INCOME INSECURITY: HOW HARD IS IT FOR YOU TO PAY FOR THE VERY BASICS LIKE FOOD, HOUSING, MEDICAL CARE, AND HEATING?: NOT HARD AT ALL

## 2022-09-02 ASSESSMENT — ENCOUNTER SYMPTOMS
ABDOMINAL PAIN: 1
NAUSEA: 1
CHILLS: 0
DIARRHEA: 0
VOMITING: 0
WEIGHT LOSS: 1
CONSTIPATION: 0
FEVER: 0
SHORTNESS OF BREATH: 0

## 2022-09-02 ASSESSMENT — FIBROSIS 4 INDEX: FIB4 SCORE: 0.64

## 2022-09-02 ASSESSMENT — PAIN DESCRIPTION - PAIN TYPE
TYPE: ACUTE PAIN
TYPE: ACUTE PAIN

## 2022-09-02 NOTE — PROGRESS NOTES
Gastroenterology Progress Note     Author: David August M.D.   Date & Time Created: 9/2/2022 8:51 AM    Chief Complaint:  Epigastric pain, nausea and vomiting, failure tot thrive, hypotension    History of Presenting Illness  52 y.o. female with chronic pain syndrome, RA, prior perforated duodenal ulcer and pancreatitis 2019 who presented 8/31/2022 with hypotension, persistent nausea and vomiting, failure to thrive, severe epigastric pain.     She has history of some abdominal pains in the past.  Had prior cholecystectomy for biliary dyskinesia.  Had perforated duodenal ulcer with pancreatitis 2019.  Had improved abdominal pain for one year post-surgery and then developed recurrent epigastric pain.  She notes that pains were initially intermittent and have since become more severe since the beginning of the year.  Has severe epigastric pain without significant radiation that is now constant but does worsen after eating.  Has fear of eating due to pain as well as has developed intermittent nausea and vomiting but really only able to tolerate minimal amounts and has lost 40 pounds of weight since the beginning of the year.  No bowel issues including diarrhea, constipation, melena, hematochezia.     She does have chronic pain and on opioids chronically.  Dose escalated June 2022 but no change in abdominal pain with opioids.     She has undergone evaluation for symptoms.  Did have EGD and EUS 2020 given dilated bile and pancreas ducts.  These were normal without obvious findings of chronic pancreatitis.  She underwent repeat EGD 6/2022 which was normal with mild gastritis and duodenitis.  Gastric and duodenal biopsies normal.  She has had multiple CT scans without findings although recent CT 8/27 showed questionable SMA syndrome without obstruction.  No prior motility studies.  Is scheduled for outpatient EUS celiac plexus block 10/2022.    Interval History:  9/1/2022: No changes overnight.  Awaiting  additional studies including US Doppler mesenteric vessels and UGI SBFT.  Cortisol slightly low but normal    9/2/2022: US Doppler mesenteric vessels normal.  UGI SBFT normal.  Tolerated Amharic toast and rice and cookies.  Still with pain but stable.  Wants to go home.  BPs remain low but baseline in 90s systolic and currently no dizziness or other symptoms of dehydration    Review of Systems:  Review of Systems   Constitutional:  Positive for malaise/fatigue and weight loss. Negative for chills and fever.   Respiratory:  Negative for shortness of breath.    Cardiovascular:  Negative for chest pain.   Gastrointestinal:  Positive for abdominal pain and nausea. Negative for constipation, diarrhea and vomiting.     Physical Exam:  Physical Exam  Vitals and nursing note reviewed.   Constitutional:       Appearance: She is underweight.   Cardiovascular:      Rate and Rhythm: Normal rate and regular rhythm.   Pulmonary:      Effort: Pulmonary effort is normal. No respiratory distress.      Breath sounds: Normal breath sounds. No wheezing.   Abdominal:      General: Abdomen is flat. Bowel sounds are normal. There is no distension.      Palpations: Abdomen is soft. There is no mass.      Tenderness: There is abdominal tenderness in the epigastric area. There is no guarding or rebound.      Hernia: No hernia is present.       Labs:          Recent Labs     08/31/22  1047 09/01/22  0808 09/02/22  0444   SODIUM 135  --  136   POTASSIUM 3.5*  --  4.1   CHLORIDE 103  --  109   CO2 19*  --  18*   BUN 6*  --  4*   CREATININE 0.64  --  0.60   MAGNESIUM  --  1.8  --    CALCIUM 9.0  --  8.3*       Recent Labs     08/31/22  1047 09/02/22  0444   ALTSGPT <5  --    ASTSGOT 8*  --    ALKPHOSPHAT 124*  --    TBILIRUBIN <0.2  --    LIPASE 65*  --    GLUCOSE 103* 99       Recent Labs     08/31/22  1014 09/02/22  0444   RBC 4.26 3.14*   HEMOGLOBIN 13.3 9.8*   HEMATOCRIT 40.3 30.9*   PLATELETCT 367 295       Recent Labs     08/31/22  1014  22  1047 22  0444   WBC 8.9  --  7.2   NEUTSPOLYS 64.00  --   --    LYMPHOCYTES 26.70  --   --    MONOCYTES 6.20  --   --    EOSINOPHILS 1.80  --   --    BASOPHILS 1.00  --   --    ASTSGOT  --  8*  --    ALTSGPT  --  <5  --    ALKPHOSPHAT  --  124*  --    TBILIRUBIN  --  <0.2  --        Hemodynamics:  Temp (24hrs), Av.9 °C (98.5 °F), Min:36.8 °C (98.2 °F), Max:37.1 °C (98.8 °F)  Temperature: 37.1 °C (98.7 °F)  Pulse  Av  Min: 55  Max: 125   Blood Pressure: (Abnormal) 91/60 (nurse notified)     Respiratory:    Respiration: 16, Pulse Oximetry: 95 %        RUL Breath Sounds: Clear, RML Breath Sounds: Diminished, RLL Breath Sounds: Diminished, NIXON Breath Sounds: Clear, LLL Breath Sounds: Diminished  Fluids:    Intake/Output Summary (Last 24 hours) at 2022 0949  Last data filed at 2022 1844  Gross per 24 hour   Intake 250 ml   Output no documentation   Net 250 ml     Weight: 45.7 kg (100 lb 12 oz)  GI/Nutrition:  Orders Placed This Encounter   Procedures    Diet Order Diet: Low Fiber(GI Soft); Nutrient modifications: (optional): Low Fat     Standing Status:   Standing     Number of Occurrences:   1     Order Specific Question:   Diet:     Answer:   Low Fiber(GI Soft) [2]     Order Specific Question:   Nutrient modifications: (optional)     Answer:   Low Fat [5]     Medical Decision Making, by Problem:  Active Hospital Problems    Diagnosis     *Hypotension [I95.9]     Protein-calorie malnutrition, severe (HCC) [E43]     Chronic pain syndrome [G89.4]     Unintentional weight loss [R63.4]     Hypokalemia [E87.6]     Arthritis, rheumatoid (HCC) [M06.9]     Tobacco dependence [F17.200]      Assessment/Plan  53 y/o with complicated history including RA, chronic pain syndrome, prior perforated DU and pancreatitis, prior nicholas with worsening severe epigastric pain, nausea and vomiting, weight loss and failure to thrive.  Question chronic mesenteric ischemia versus dysmotility versus narcotic bowel  syndrome versus less likely SMA syndrome, chronic pancreatitis, functional dyspepsia.  Somewhat of challenge to manage.     PROBLEMS:  Hypotension.  Dehydration resolved and BPs remain low, question baseline  Persistent nausea and vomiting.  No evidence for small bowel obstruction or SMA syndrome.  Question dysmotility  Severe epigastric pain.  No obvious cause.  Question severe functional dyspepsia or chronic pancreatitis or narcotic bowel syndrome  Failure to thrive  Abnormal CT scan showing possible SMA syndrome  Chronic pain syndrome  Rheumatoid arthritis  Prior perforated duodenal ulcer and pancreatitis 2019  Anxiety/depression    Plan:  Recommend EUS celiac plexus block.  Has been moved up to 9/9.    Continue gabapentin, PPI BID and sucralfate QID  Discussed small, frequent low fat, low fiber meals for potential gastroparesis.  She admits that she is a junk food-aholic which likely not helping.  Discussed supplemental protein shakes  OK for discharge from GI standpoint.    Quality-Core Measures

## 2022-09-02 NOTE — DISCHARGE SUMMARY
Discharge Summary    CHIEF COMPLAINT ON ADMISSION  Chief Complaint   Patient presents with    Hypotension     Ongoing for past week, pt was at GI office this AM and BP was low, c/o lightheadedness    Abdominal Pain     C/o pain and discomfort - chronic, pt states she has SMA syndrome; denies any n/v       Reason for Admission  Abdominal Pain, Low Blood Pressure*     Admission Date  8/31/2022    CODE STATUS  Full Code    HPI & HOSPITAL COURSE  Mary Martinez is a 52 y.o. female with asthma, rheumatoid arthritis on Enbrel, chronic pain on oxycodone, history of expiratory laparotomy after duodenal perforation (2019), and prior cholecystectomy who was recently admitted at [facility] for nausea, vomiting, and hypotension, with significant weight loss with dehydration.  Work-up at that time with CT of the abdomen/pelvis suggestive of SMA syndrome though a CTA was not performed.  She has been followed by GI consultants since and has had multiple EGDs in the past.  Both general surgery and GI on her last admission recommended outpatient follow-up as she was tolerating some degree of oral intake, and therefore G-tube was not indicated.  However, she is admitted 8/31/2022 with she has not been able to tolerate effectively any p.o. since.  Her blood pressure was also low in the 80s/50-60s.  No leukocytosis.  Electrolytes and renal function are normal.  Her cortisol is normal.  Urinalysis showed no pyuria.  Lactate 2.1.  Lipase 65.  Albumin 3.4.  She was felt to be hypovolemic.  She was placed on IV fluids.  GI was consulted who recommended US Doppler of mesenteric vessels, upper GI SBFT.  She was started on Neurontin 300 mg twice daily, along with opiate analgesics.  She was also started on midodrine.    SBFT showed no evidence of obstructing or constricting lesion or evidence of small bowel obstruction, with no evidence of dilatation of the proximal duodenum to suggest presence of superior mesenteric artery  syndrome.  Ultrasound of the mesenteric artery also showed no significant vascular stenosis.  Her pain was better controlled with addition of the Neurontin.  Her blood pressure trend also improved, although it was running soft, but she remained asymptomatic.  He was able to tolerate oral diet.  Her electrolytes and renal function remain normal, with hemoglobin and platelet counts stable.  GI gave the recommendation that she should get an EUS celiac plexus block as an outpatient, and this was scheduled on 9/9/2022.    I have personally seen and examined the patient on the day of discharge. With her clinical improvement, she was deemed ready to discharge from the hospital as she did not have any further hospitalization needs. Patient felt comfortable going home. The discharge plan was discussed with the patient, with which she was agreeable to.      Therefore, she is discharged in fair and stable condition to home with close outpatient follow-up.    The patient met 2-midnight criteria for an inpatient stay at the time of discharge.    Discharge Date  9/2/2022      FOLLOW UP ITEMS POST DISCHARGE  - continue neurontin.   - continue midodrine.   - follow-up with GI for EUS celiac plexus block.   - counseled to seek immediate medical attention, or return to the ED for recurrent or worsening symptoms.      DISCHARGE DIAGNOSES  Principal Problem:    Hypotension POA: Yes  Active Problems:    Epigastric abdominal pain POA: Yes    Hypokalemia POA: Yes    Unintentional weight loss POA: Yes    Protein-calorie malnutrition, severe (HCC) POA: Yes    Tobacco dependence POA: Yes    Arthritis, rheumatoid (HCC) POA: Yes    Chronic pain syndrome POA: Yes  Resolved Problems:    Hypotension due to hypovolemia and medications POA: Yes      FOLLOW UP  Future Appointments   Date Time Provider Department Center   9/30/2022 11:00 AM Jayy Kerr M.D. [facility] None     No follow-up provider specified.    MEDICATIONS ON DISCHARGE      Medication List        START taking these medications        Instructions   gabapentin 300 MG Caps  Commonly known as: NEURONTIN   Take 1 Capsule by mouth 2 times a day.  Dose: 300 mg     midodrine 10 MG tablet  Commonly known as: PROAMATINE   Take 1 Tablet by mouth 3 times a day with meals.  Dose: 10 mg            CONTINUE taking these medications        Instructions   B-12 1000 MCG Tabs   Take 1,000 mg by mouth every day.  Dose: 1,000 mg     Etanercept 25 MG Solr  Commonly known as: ENBREL   Inject 50 mg under the skin every 7 days. Tuesday  Dose: 50 mg     fluconazole 100 MG Tabs  Commonly known as: DIFLUCAN   Take 100-200 mg by mouth every day. Pt started on 8/23/2022 for 11 day course, pt was to take 200MG on the first day and 100MG for the next 10 days.  Dose: 100-200 mg     folic acid 1 MG Tabs  Commonly known as: FOLVITE   Take 1 Tablet by mouth 2 times a day.  Dose: 1 mg     nicotine 21 MG/24HR Pt24  Commonly known as: NICODERM   Place 1 Patch on the skin every 24 hours.  Dose: 1 Patch     ondansetron 8 MG Tbdp  Commonly known as: ZOFRAN ODT   Take 8 mg by mouth every 8 hours as needed for Nausea.  Dose: 8 mg     * oxyCODONE-acetaminophen  MG Tabs  Start taking on: September 4, 2022  Commonly known as: PERCOCET-10   Take 1 Tablet by mouth every four hours as needed for Severe Pain for up to 30 days.  Dose: 1 Tablet     PARoxetine 30 MG Tabs  Commonly known as: PAXIL   Take 60 mg by mouth every evening.  Dose: 60 mg     * QUEtiapine 100 MG Tabs  Commonly known as: Seroquel   Take 100 mg by mouth every evening.  Dose: 100 mg     * QUEtiapine 25 MG Tabs  Commonly known as: Seroquel   Take 25 mg by mouth 2 times a day as needed for Anxiety.  Dose: 25 mg     sucralfate 1 GM Tabs  Commonly known as: CARAFATE   Take 1 Tablet by mouth 4 Times a Day,Before Meals and at Bedtime.  Dose: 1 g     thiamine 100 MG tablet  Commonly known as: THIAMINE   Take 1 Tablet by mouth 2 times a day.  Dose: 100 mg           *  This list has 3 medication(s) that are the same as other medications prescribed for you. Read the directions carefully, and ask your doctor or other care provider to review them with you.                ASK your doctor about these medications        Instructions   Naloxone 4 MG/0.1ML Liqd  Commonly known as: NARCAN   One spray in one nostril for overdose and call 911.     * oxyCODONE-acetaminophen  MG Tabs  Commonly known as: PERCOCET-10   Take 1 Tablet by mouth every four hours as needed for Severe Pain for up to 30 days.  Dose: 1 Tablet     pantoprazole 40 MG Tbec  Commonly known as: PROTONIX   Take 1 Tablet by mouth every day.  Dose: 40 mg           * This list has 1 medication(s) that are the same as other medications prescribed for you. Read the directions carefully, and ask your doctor or other care provider to review them with you.                  Allergies  Allergies   Allergen Reactions    Penicillins Anaphylaxis and Hives     Tolerates cephalosporins; reports throat swelling with PCN    Aripiprazole Nausea     Spasms, shaking      Nitrous Oxide Vomiting    Abilify     Nitrous Oxide     Penicillin G        DIET  Orders Placed This Encounter   Procedures    Diet Order Diet: Low Fiber(GI Soft); Nutrient modifications: (optional): Low Fat     Standing Status:   Standing     Number of Occurrences:   1     Order Specific Question:   Diet:     Answer:   Low Fiber(GI Soft) [2]     Order Specific Question:   Nutrient modifications: (optional)     Answer:   Low Fat [5]       ACTIVITY  As tolerated.  Weight bearing as tolerated    CONSULTATIONS  GI    PROCEDURES  As above    LABORATORY  Lab Results   Component Value Date    SODIUM 136 09/02/2022    POTASSIUM 4.1 09/02/2022    CHLORIDE 109 09/02/2022    CO2 18 (L) 09/02/2022    GLUCOSE 99 09/02/2022    BUN 4 (L) 09/02/2022    CREATININE 0.60 09/02/2022    CREATININE 0.7 11/29/2008        Lab Results   Component Value Date    WBC 7.2 09/02/2022    HEMOGLOBIN 9.8  (L) 09/02/2022    HEMATOCRIT 30.9 (L) 09/02/2022    PLATELETCT 295 09/02/2022        Total time of the discharge process = 40 minutes.

## 2022-09-02 NOTE — DISCHARGE INSTRUCTIONS
Discharge Instructions    Discharged to home by car with friend. Discharged via wheelchair, hospital escort: Yes.  Special equipment needed: Not Applicable    Be sure to schedule a follow-up appointment with your primary care doctor or any specialists as instructed.     Discharge Plan:   Diet Plan: Discussed  Activity Level: Discussed  Confirmed Follow up Appointment: Patient to Call and Schedule Appointment  Confirmed Symptoms Management: Discussed  Medication Reconciliation Updated: No (Comments)    I understand that a diet low in cholesterol, fat, and sodium is recommended for good health. Unless I have been given specific instructions below for another diet, I accept this instruction as my diet prescription.   Other diet: regular      Special Instructions: None    -Is this patient being discharged with medication to prevent blood clots?  No    Is patient discharged on Warfarin / Coumadin?   No

## 2022-09-02 NOTE — PROGRESS NOTES
DELIA Morales contacted pt via TC post d/c to introduce CCM services. Completed SDOH screening and outpatient assessment. Pt has established PCP at [facility] and will schedule follow up as needed. Pt declined assistance with scheduling at this time. Pt mentions good support from family and friends. Pt is confident in ability to manage care post d/c. No issues keeping appointments or financial barriers to care. Completed AVS review with no questions. Pt was able to obtain med's post discharge. Pt denies need for resources such as food, transportation or housing. CCM contact info left with pt. Encouraged pt to contact if needed.     Community Health Worker Intake  Social determinates of health intake completed.   Identified barriers to none.  Contact information provided to Mary Martinez. Yes   Has PCP appointment scheduled for. Pt states she will schedule PCP follow up as needed.   Scheduled Food Delivery/Home Visit/Outpatient Visit: No  Accepted/Declined Med's-To-Beds. No  Inpatient/Outpatient assessment completed. Outpatient   Did the patient receive medications post discharge: Yes    Plan: Additional 30 day follow up outreach.

## 2022-09-02 NOTE — CARE PLAN
The patient is Stable - Low risk of patient condition declining or worsening    Shift Goals  Clinical Goals: maintain BP  Patient Goals: control pain  Family Goals: NAIN    Progress made toward(s) clinical / shift goals:    Problem: Pain - Standard  Goal: Alleviation of pain or a reduction in pain to the patient’s comfort goal  Outcome: Progressing: pt pain decreased due to medication change     Problem: Knowledge Deficit - Standard  Goal: Patient and family/care givers will demonstrate understanding of plan of care, disease process/condition, diagnostic tests and medications  Outcome: Progressing: pt verbalized understanding of POC     Problem: Fall Risk  Goal: Patient will remain free from falls  Outcome: Progressing: pt remained free from falls throughout shift

## 2022-09-02 NOTE — PROGRESS NOTES
Telemetry Shift Summary    Rhythm SR  HR Range 63-99  Ectopy r-oPAC  Measurements .20/.12/.40        Normal Values  Rhythm SR  HR Range    Measurements 0.12-0.20 / 0.06-0.10  / 0.30-0.52

## 2022-09-02 NOTE — PROGRESS NOTES
Received report from Hamlet Mayer. Pt AOx4 and reports 7/10 pain. Pt updated on Poc for the day. Pt has no further questions or needs at this time.

## 2022-09-06 ENCOUNTER — PATIENT OUTREACH (OUTPATIENT)
Dept: HEALTH INFORMATION MANAGEMENT | Facility: OTHER | Age: 52
End: 2022-09-06
Payer: MEDICAID

## 2022-09-06 LAB — VIT B1 BLD-MCNC: 77 NMOL/L (ref 70–180)

## 2022-09-06 NOTE — PROGRESS NOTES
Additional 30 day outreach follow up with pt. Pt states no needs at this time and is confident in ability to manage care. Pt to schedule her own follow up as needed All needs met.

## 2022-09-07 ENCOUNTER — PRE-ADMISSION TESTING (OUTPATIENT)
Dept: ADMISSIONS | Facility: MEDICAL CENTER | Age: 52
End: 2022-09-07
Attending: INTERNAL MEDICINE
Payer: MEDICAID

## 2022-09-07 ASSESSMENT — FIBROSIS 4 INDEX: FIB4 SCORE: 0.64

## 2022-09-07 NOTE — PREPROCEDURE INSTRUCTIONS
Pre admit appointment completed, questions answered. Pt instructed to continue regularly prescribed meds through day of procedure. Per anesthesia protocol instructed to take these meds the day of procedure- gabapentin, midodrine, quetiapine, carafate and if needed zofran and percocet. METs score >4. Hx of dizziness and falls, placed on fall risk.

## 2022-09-09 ENCOUNTER — ANESTHESIA EVENT (OUTPATIENT)
Dept: SURGERY | Facility: MEDICAL CENTER | Age: 52
End: 2022-09-09
Payer: MEDICAID

## 2022-09-09 ENCOUNTER — HOSPITAL ENCOUNTER (OUTPATIENT)
Facility: MEDICAL CENTER | Age: 52
End: 2022-09-09
Attending: INTERNAL MEDICINE | Admitting: INTERNAL MEDICINE
Payer: MEDICAID

## 2022-09-09 ENCOUNTER — ANESTHESIA (OUTPATIENT)
Dept: SURGERY | Facility: MEDICAL CENTER | Age: 52
End: 2022-09-09
Payer: MEDICAID

## 2022-09-09 VITALS
WEIGHT: 104.28 LBS | HEART RATE: 89 BPM | BODY MASS INDEX: 18.48 KG/M2 | DIASTOLIC BLOOD PRESSURE: 56 MMHG | HEIGHT: 63 IN | RESPIRATION RATE: 16 BRPM | TEMPERATURE: 97.7 F | OXYGEN SATURATION: 92 % | SYSTOLIC BLOOD PRESSURE: 89 MMHG

## 2022-09-09 LAB — PATHOLOGY CONSULT NOTE: NORMAL

## 2022-09-09 PROCEDURE — 160002 HCHG RECOVERY MINUTES (STAT): Performed by: INTERNAL MEDICINE

## 2022-09-09 PROCEDURE — 700111 HCHG RX REV CODE 636 W/ 250 OVERRIDE (IP): Performed by: ANESTHESIOLOGY

## 2022-09-09 PROCEDURE — 00731 ANES UPR GI NDSC PX NOS: CPT | Performed by: ANESTHESIOLOGY

## 2022-09-09 PROCEDURE — 160203 HCHG ENDO MINUTES - 1ST 30 MINS LEVEL 4: Performed by: INTERNAL MEDICINE

## 2022-09-09 PROCEDURE — 700105 HCHG RX REV CODE 258: Performed by: INTERNAL MEDICINE

## 2022-09-09 PROCEDURE — 88312 SPECIAL STAINS GROUP 1: CPT

## 2022-09-09 PROCEDURE — 88305 TISSUE EXAM BY PATHOLOGIST: CPT

## 2022-09-09 PROCEDURE — 160025 RECOVERY II MINUTES (STATS): Performed by: INTERNAL MEDICINE

## 2022-09-09 PROCEDURE — 160046 HCHG PACU - 1ST 60 MINS PHASE II: Performed by: INTERNAL MEDICINE

## 2022-09-09 PROCEDURE — 700101 HCHG RX REV CODE 250: Performed by: ANESTHESIOLOGY

## 2022-09-09 PROCEDURE — 160035 HCHG PACU - 1ST 60 MINS PHASE I: Performed by: INTERNAL MEDICINE

## 2022-09-09 PROCEDURE — 160048 HCHG OR STATISTICAL LEVEL 1-5: Performed by: INTERNAL MEDICINE

## 2022-09-09 PROCEDURE — 700101 HCHG RX REV CODE 250: Performed by: INTERNAL MEDICINE

## 2022-09-09 PROCEDURE — 160009 HCHG ANES TIME/MIN: Performed by: INTERNAL MEDICINE

## 2022-09-09 RX ORDER — ONDANSETRON 2 MG/ML
4 INJECTION INTRAMUSCULAR; INTRAVENOUS
Status: DISCONTINUED | OUTPATIENT
Start: 2022-09-09 | End: 2022-09-09 | Stop reason: HOSPADM

## 2022-09-09 RX ORDER — ALBUTEROL SULFATE 2.5 MG/3ML
2.5 SOLUTION RESPIRATORY (INHALATION)
Status: DISCONTINUED | OUTPATIENT
Start: 2022-09-09 | End: 2022-09-09 | Stop reason: HOSPADM

## 2022-09-09 RX ORDER — OXYCODONE HCL 5 MG/5 ML
10 SOLUTION, ORAL ORAL
Status: DISCONTINUED | OUTPATIENT
Start: 2022-09-09 | End: 2022-09-09 | Stop reason: HOSPADM

## 2022-09-09 RX ORDER — SODIUM CHLORIDE, SODIUM LACTATE, POTASSIUM CHLORIDE, CALCIUM CHLORIDE 600; 310; 30; 20 MG/100ML; MG/100ML; MG/100ML; MG/100ML
INJECTION, SOLUTION INTRAVENOUS CONTINUOUS
Status: DISCONTINUED | OUTPATIENT
Start: 2022-09-09 | End: 2022-09-09 | Stop reason: HOSPADM

## 2022-09-09 RX ORDER — OXYCODONE HCL 5 MG/5 ML
5 SOLUTION, ORAL ORAL
Status: DISCONTINUED | OUTPATIENT
Start: 2022-09-09 | End: 2022-09-09 | Stop reason: HOSPADM

## 2022-09-09 RX ORDER — CEFAZOLIN SODIUM 1 G/3ML
INJECTION, POWDER, FOR SOLUTION INTRAMUSCULAR; INTRAVENOUS PRN
Status: DISCONTINUED | OUTPATIENT
Start: 2022-09-09 | End: 2022-09-09 | Stop reason: SURG

## 2022-09-09 RX ORDER — LABETALOL HYDROCHLORIDE 5 MG/ML
5 INJECTION, SOLUTION INTRAVENOUS
Status: DISCONTINUED | OUTPATIENT
Start: 2022-09-09 | End: 2022-09-09 | Stop reason: HOSPADM

## 2022-09-09 RX ORDER — MEPERIDINE HYDROCHLORIDE 25 MG/ML
12.5 INJECTION INTRAMUSCULAR; INTRAVENOUS; SUBCUTANEOUS
Status: DISCONTINUED | OUTPATIENT
Start: 2022-09-09 | End: 2022-09-09 | Stop reason: HOSPADM

## 2022-09-09 RX ORDER — METOPROLOL TARTRATE 1 MG/ML
1 INJECTION, SOLUTION INTRAVENOUS
Status: DISCONTINUED | OUTPATIENT
Start: 2022-09-09 | End: 2022-09-09 | Stop reason: HOSPADM

## 2022-09-09 RX ORDER — PHENYLEPHRINE HCL IN 0.9% NACL 0.5 MG/5ML
SYRINGE (ML) INTRAVENOUS PRN
Status: DISCONTINUED | OUTPATIENT
Start: 2022-09-09 | End: 2022-09-09 | Stop reason: SURG

## 2022-09-09 RX ORDER — DIPHENHYDRAMINE HYDROCHLORIDE 50 MG/ML
12.5 INJECTION INTRAMUSCULAR; INTRAVENOUS
Status: DISCONTINUED | OUTPATIENT
Start: 2022-09-09 | End: 2022-09-09 | Stop reason: HOSPADM

## 2022-09-09 RX ORDER — MIDAZOLAM HYDROCHLORIDE 1 MG/ML
1 INJECTION INTRAMUSCULAR; INTRAVENOUS
Status: DISCONTINUED | OUTPATIENT
Start: 2022-09-09 | End: 2022-09-09 | Stop reason: HOSPADM

## 2022-09-09 RX ORDER — LIDOCAINE HYDROCHLORIDE 20 MG/ML
INJECTION, SOLUTION EPIDURAL; INFILTRATION; INTRACAUDAL; PERINEURAL PRN
Status: DISCONTINUED | OUTPATIENT
Start: 2022-09-09 | End: 2022-09-09 | Stop reason: SURG

## 2022-09-09 RX ORDER — HYDRALAZINE HYDROCHLORIDE 20 MG/ML
5 INJECTION INTRAMUSCULAR; INTRAVENOUS
Status: DISCONTINUED | OUTPATIENT
Start: 2022-09-09 | End: 2022-09-09 | Stop reason: HOSPADM

## 2022-09-09 RX ADMIN — PROPOFOL 25 MG: 10 INJECTION, EMULSION INTRAVENOUS at 14:52

## 2022-09-09 RX ADMIN — Medication 50 MCG: at 14:37

## 2022-09-09 RX ADMIN — LIDOCAINE HYDROCHLORIDE 50 MG: 20 INJECTION, SOLUTION EPIDURAL; INFILTRATION; INTRACAUDAL at 14:35

## 2022-09-09 RX ADMIN — CEFAZOLIN 2 G: 330 INJECTION, POWDER, FOR SOLUTION INTRAMUSCULAR; INTRAVENOUS at 14:40

## 2022-09-09 RX ADMIN — FENTANYL CITRATE 25 MCG: 50 INJECTION, SOLUTION INTRAMUSCULAR; INTRAVENOUS at 14:39

## 2022-09-09 RX ADMIN — Medication 100 MCG: at 14:54

## 2022-09-09 RX ADMIN — PROPOFOL 25 MG: 10 INJECTION, EMULSION INTRAVENOUS at 14:47

## 2022-09-09 RX ADMIN — Medication 100 MCG: at 14:40

## 2022-09-09 RX ADMIN — PROPOFOL 25 MG: 10 INJECTION, EMULSION INTRAVENOUS at 14:40

## 2022-09-09 RX ADMIN — PROPOFOL 50 MG: 10 INJECTION, EMULSION INTRAVENOUS at 14:35

## 2022-09-09 RX ADMIN — FENTANYL CITRATE 25 MCG: 50 INJECTION, SOLUTION INTRAMUSCULAR; INTRAVENOUS at 14:35

## 2022-09-09 RX ADMIN — Medication 100 MCG: at 14:43

## 2022-09-09 RX ADMIN — Medication 100 MCG: at 14:50

## 2022-09-09 RX ADMIN — LIDOCAINE HYDROCHLORIDE 0.5 ML: 10 INJECTION, SOLUTION EPIDURAL; INFILTRATION; INTRACAUDAL; PERINEURAL at 13:13

## 2022-09-09 RX ADMIN — SODIUM CHLORIDE, POTASSIUM CHLORIDE, SODIUM LACTATE AND CALCIUM CHLORIDE: 600; 310; 30; 20 INJECTION, SOLUTION INTRAVENOUS at 13:13

## 2022-09-09 ASSESSMENT — PAIN SCALES - GENERAL: PAIN_LEVEL: 0

## 2022-09-09 ASSESSMENT — PAIN DESCRIPTION - PAIN TYPE
TYPE: ACUTE PAIN
TYPE: CHRONIC PAIN

## 2022-09-09 ASSESSMENT — FIBROSIS 4 INDEX: FIB4 SCORE: 0.64

## 2022-09-09 NOTE — OR NURSING
1500: To PACU from [facility] via [facility],  respirations spontaneous and non-labored. Pt rouses to voice, denies nausea and pain.  1515: Pt resting, more awake, sitting up.  1530: Pt awake, tolerating sips of water, denies pain and nausea. Meets criteria to transfer to Stage 2. Report to Maryam PERDOMO

## 2022-09-09 NOTE — OR NURSING
1553 Pt discharged to home with mom after uneventful stay in Stage 2. Discharge instructions reviewed, questions answered. PIV removed. Pt refused escort off unit, preferred to walk to vehicle.

## 2022-09-09 NOTE — ANESTHESIA TIME REPORT
Anesthesia Start and Stop Event Times     Date Time Event    9/9/2022 1354 Ready for Procedure     1431 Anesthesia Start     1505 Anesthesia Stop        Responsible Staff  09/09/22    Name Role Begin End    Paul Banks M.D. Anesth 1431 1505        Overtime Reason:  no overtime (within assigned shift)    Comments:

## 2022-09-09 NOTE — ANESTHESIA POSTPROCEDURE EVALUATION
Patient: Mary Martinez    Procedure Summary     Date: 09/09/22 Room / Location:  ENDOSCOPIC ULTRASOUND ROOM / SURGERY [facility]    Anesthesia Start: 1431 Anesthesia Stop: 1505    Procedures:       ENDOSCOPIC ULTRASOUND- UPPER (Abdomen)      EGD, WITH ENDOSCOPIC US (Abdomen) Diagnosis: (CANDIDA ESOPHAGITIS, EPIGASTRIC PAIN, NAUSEA AND VOMITING)    Surgeons: Jorge Brooke M.D. Responsible Provider: Paul Banks M.D.    Anesthesia Type: MAC ASA Status: 3          Final Anesthesia Type: MAC  Last vitals  BP   Blood Pressure: (!) 89/51    Temp   36.8 °C (98.3 °F)    Pulse   81   Resp   16    SpO2   90 %      Anesthesia Post Evaluation    Patient location during evaluation: PACU  Patient participation: complete - patient participated  Level of consciousness: awake and alert  Pain score: 0    Airway patency: patent  Anesthetic complications: no  Cardiovascular status: hemodynamically stable  Respiratory status: acceptable  Hydration status: euvolemic    PONV: none          No notable events documented.     Nurse Pain Score: 7 (NPRS)

## 2022-09-09 NOTE — DISCHARGE INSTRUCTIONS
If any questions arise, call your provider.  If your provider is not available, please feel free to call the [facility] at [phone].    MEDICATIONS: Resume taking daily medication.  Take prescribed pain medication with food.  If no medication is prescribed, you may take non-aspirin pain medication if needed.  PAIN MEDICATION CAN BE VERY CONSTIPATING.  Take a stool softener or laxative such as senokot, pericolace, or milk of magnesia if needed.    What to Expect Post Anesthesia    Rest and take it easy for the first 24 hours.  A responsible adult is recommended to remain with you during that time.  It is normal to feel sleepy.  We encourage you to not do anything that requires balance, judgment or coordination.    FOR 24 HOURS DO NOT:  Drive, operate machinery or run household appliances.  Drink beer or alcoholic beverages.  Make important decisions or sign legal documents.    To avoid nausea, slowly advance diet as tolerated, avoiding spicy or greasy foods for the first day.  Add more substantial food to your diet according to your provider's instructions. INCREASE FLUIDS AND FIBER TO AVOID CONSTIPATION.    MILD FLU-LIKE SYMPTOMS ARE NORMAL.  YOU MAY EXPERIENCE GENERALIZED MUSCLE ACHES, THROAT IRRITATION, HEADACHE AND/OR SOME NAUSEA.    ENDOSCOPY HOME CARE INSTRUCTIONS    GASTROSCOPY OR ERCP  1. Don't eat or drink anything for about an hour after the test. You can then resume your regular diet.  2. Don't drive or drink alcohol for 24 hours. The medication you received will make you too drowsy.  3. Don't take any coffee, tea, or aspirin products until after you see your doctor. These can harm the lining of your stomach.  4. If you begin to vomit bloody material, or develop black or bloody stools, call your doctor as soon as possible.  5. If you have any neck, chest, abdominal pain or temp of 100 degrees, call your doctor.  6. See your doctor : Call to schedule a follow-up appointment  7. Additional  instructions:  Findings:             EGD                         Esophagus                                     Multiple white spots throughout entire esophagus biopsied rule out Candida                                     Otherwise unremarkable mucosa                         Stomach                                     Hiatal hernia from 35 to 40 cm                                     Unremarkable mucosa                         Duodenum                                     Unremarkable mucosa                EUS                         Celiac axis                                     No adenopathy                         Pancreas body/tail                                     Very normal-appearing parenchyma throughout                         Pancreatic duct                                     Normal course and caliber                         Ampulla                                     Unremarkable                         CBD                                     Normal course and caliber                                     No filling defects                         Head of pancreas                                     Normal dorsal ventral transition                                     Very normal-appearing parenchyma                            Celiac plexus block                                     5mL     NS cushion                                     20mL   Bupivacaine - 0.25%                                     10mL   Triamcinolone - 80mg total                                     10mL   Dehydrated alcohol - 98%                                     5mL     NS flush      Plan:              Wean pain medication as tolerated                         Follow-up pathology                         Follow-up in office    You should call 911 if you develop problems with breathing or chest pain.  If any questions arise, call your doctor. If your doctor is not available, please feel free to call [phone]. You can also call the  HEALTH HOTLINE open 24 hours/day, 7 days/week and speak to a nurse at [phone], or toll free [phone].

## 2022-09-09 NOTE — OR SURGEON
EGD, endoscopic ultrasound with celiac plexus block operative note    PreOp Diagnosis: Chronic abdominal pain      PostOp Diagnosis: Same      Procedure(s):  ENDOSCOPIC ULTRASOUND- UPPER - Wound Class: None  EGD, WITH ENDOSCOPIC US - Wound Class: None    Surgeon(s):  Jorge Brooke M.D.    Anesthesiologist/Type of Anesthesia:  Anesthesiologist: Paul Banks M.D./General    Surgical Staff:  Circulator: Arie Hines R.N.  Endoscopy Technician: Sloane Lovett; Gavi Head; Thea Cintron    Specimens removed if any:  ID Type Source Tests Collected by Time Destination   A : Esophageal, Biopsy, R/O Candida Other Other PATHOLOGY SPECIMEN Jorge Brooke M.D. 9/9/2022  2:42 PM        Dr. Boroke  [facility]  [phone]    EGD with: Biopsy    EUS with: Celiac plexus block    Indication: Chronic abdominal pain    Sedation: MAC (Dr. Banks)    Findings:   EGD    Esophagus     Multiple white spots throughout entire esophagus biopsied rule out Candida     Otherwise unremarkable mucosa    Stomach     Hiatal hernia from 35 to 40 cm     Unremarkable mucosa    Duodenum     Unremarkable mucosa     EUS    Celiac axis     No adenopathy    Pancreas body/tail     Very normal-appearing parenchyma throughout    Pancreatic duct     Normal course and caliber    Ampulla     Unremarkable    CBD     Normal course and caliber     No filling defects    Head of pancreas     Normal dorsal ventral transition     Very normal-appearing parenchyma      Celiac plexus block     5mL NS cushion     20mL Bupivacaine - 0.25%     10mL Triamcinolone - 80mg total     10mL Dehydrated alcohol - 98%     5mL NS flush     Plan:  Wean pain medication as tolerated    Follow-up pathology    Follow-up in office      Procedure detail:    Prior to procedure, informed consent was obtained.  Risks, benefits, alternatives, including but not limited to risk of bleeding, infection, perforation, adverse reaction to sedating  medicine, failure to identify pathology, pancreatitis, diarrhea, paraplegia and death were explained to the patient who accepted all risks.    Patient was prepped in the left lateral position and IV propofol sedation was provided by anesthesia.    EGD  Scope tip of the Olympus flexible gastroscope was passed in the proximal esophagus.  Proximal middle and distal thirds of the esophagus were well visualized.  There were diffuse white specks throughout the esophagus suspicious for Candida.  Biopsies were taken.  Stomach was entered.  There was a hiatal hernia from 35 to 40 cm.  Gastric pool was suctioned and air was insufflated.  Scope was retroflexed to view the body fundus and cardia demonstrating the hiatal hernia otherwise unremarkable mucosa.  Scope was straightened to view the antrum and incisura which were grossly unremarkable.  The pylorus was patent duodenal bulb showed mild scarring but otherwise was unremarkable as was the second third and fourth portion of the duodenum.  The gastroscope was withdrawn and we proceeded with endoscopic ultrasound.    EUS  Flexible radial echoendoscope was passed in the proximal stomach.  Celiac axis was identified.  There was no adenopathy.  Pancreatic body and tail were well visualized and had very normal-appearing parenchyma throughout.  The pancreatic duct had a normal course and caliber.  Scope was advanced into the duodenum the biliary ampulla was endosonographically unremarkable.  The common bile duct had a normal course and caliber and no filling defects.  The head of the pancreas itself had normal appearing parenchyma throughout and a normal dorsal ventral transition.  The radial echoendoscope was withdrawn and we proceeded with celiac plexus block.  The flexible linear echoendoscope was passed in the proximal stomach.  The celiac axis was readily identified.  A 19-gauge Crystalplex expect needle was used to enter the space.  Following this 5 mL of normal saline  for a cushion then 20 mL of 0.25% bupivacaine then 80 mg of triamcinolone diluted to 10 mL and then 10 mL of 98% dehydrated alcohol followed by 5 mL normal saline flush were injected in the celiac space.  Prior to each injection the needle tip was confirmed endosonographically and aspiration was made and was negative for blood.  Once this was complete the procedure was deemed complete.  The needle was withdrawn air and liquid were suctioned.  The scope was withdrawn.  Patient tolerated the procedure well and was sent to recovery without immediate complications.      9/9/2022 3:02 PM Jorge Brooke M.D.

## 2022-09-09 NOTE — ANESTHESIA PREPROCEDURE EVALUATION
Case: [id number] Date/Time: 09/09/22 1345    Procedures:       ENDOSCOPIC ULTRASOUND- UPPER      EGD, WITH ENDOSCOPIC US    Pre-op diagnosis: CANDIDA ESOPHAGITIS, EPIGASTRIC PAIN, NAUSEA AND VOMITING    Location:  ENDOSCOPIC ULTRASOUND ROOM / SURGERY [facility]    Surgeons: Jorge Brooke M.D.      53 yo with multiple medical problems  Low BP  Low SpO2 88% Room AIR  P Med Hx:  Asthma  Rheumatoid arthritis  Arthritis  Indigestion  Bowel habit changes  Dental disorder  Hiatus hernia syndrome  Bronchitis  Pneumonia  Pain  Heart burn  Chronic pain  Psychiatric problem  History of pancreatitis  Allergy  Anemia  Anxiety  Blood transfusion without reported diagnosis  GERD (gastroesophageal reflux disease)  Depression  Head ache  Kidney disease  Substance abuse   SMAS (superior mesenteric artery syndrome)   Acute renal failure (ARF)   Hyponatremia  Hypokalemia  Rheumatoid aortitis  Idiopathic chronic pancreatitis   Intractable nausea and vomiting  GIB (gastrointestinal bleeding)  PONV (postoperative nausea and vomiting)    P Surg Hx:  Multiple  No reported problems with prior anesthesia    1ppd smoker x 30 y    NPO    Relevant Problems   NEURO   (positive) History of rheumatoid arthritis      CARDIAC   (positive) Rheumatoid aortitis      GI   (positive) Hiatal hernia         (positive) Acute renal failure (HCC)   (positive) Bilateral nephrolithiasis   (positive) Other hydronephrosis      Other   (positive) Arthritis, rheumatoid (HCC)   (positive) Rheumatoid arthritis (HCC)   (positive) Rheumatoid arthritis of hand (HCC)       Physical Exam    Airway   Mallampati: I  TM distance: >3 FB  Neck ROM: full       Cardiovascular - normal exam  Rhythm: regular  Rate: normal  (-) murmur     Dental - normal exam  (+) upper dentures, lower dentures           Pulmonary - normal exam  Breath sounds clear to auscultation     Abdominal    Neurological - normal exam               Anesthesia Plan    ASA 3   ASA physical status 3  criteria: COPD    Plan - MAC               Induction: intravenous      Pertinent diagnostic labs and testing reviewed    Informed Consent:    Anesthetic plan and risks discussed with patient.    Use of blood products discussed with: patient whom consented to blood products.

## 2022-09-28 ENCOUNTER — OFFICE VISIT (OUTPATIENT)
Dept: PHYSICAL MEDICINE AND REHAB | Facility: MEDICAL CENTER | Age: 52
End: 2022-09-28
Payer: MEDICAID

## 2022-09-28 VITALS
BODY MASS INDEX: 18.68 KG/M2 | OXYGEN SATURATION: 95 % | TEMPERATURE: 98 F | DIASTOLIC BLOOD PRESSURE: 64 MMHG | HEART RATE: 88 BPM | WEIGHT: 105.4 LBS | HEIGHT: 63 IN | SYSTOLIC BLOOD PRESSURE: 122 MMHG

## 2022-09-28 DIAGNOSIS — F11.90 CHRONIC, CONTINUOUS USE OF OPIOIDS: ICD-10-CM

## 2022-09-28 DIAGNOSIS — M05.79 RHEUMATOID ARTHRITIS INVOLVING MULTIPLE SITES WITH POSITIVE RHEUMATOID FACTOR (HCC): ICD-10-CM

## 2022-09-28 DIAGNOSIS — M79.645 PAIN OF LEFT THUMB: ICD-10-CM

## 2022-09-28 DIAGNOSIS — M53.2X1 ATLANTOAXIAL INSTABILITY: ICD-10-CM

## 2022-09-28 DIAGNOSIS — M19.019 ARTHRITIS OF GLENOHUMERAL JOINT: ICD-10-CM

## 2022-09-28 DIAGNOSIS — G89.4 CHRONIC PAIN SYNDROME: ICD-10-CM

## 2022-09-28 DIAGNOSIS — F32.A DEPRESSION, UNSPECIFIED DEPRESSION TYPE: ICD-10-CM

## 2022-09-28 DIAGNOSIS — M21.942 HAND DEFORMITY, ACQUIRED, LEFT: ICD-10-CM

## 2022-09-28 PROCEDURE — 99214 OFFICE O/P EST MOD 30 MIN: CPT | Performed by: PHYSICAL MEDICINE & REHABILITATION

## 2022-09-28 RX ORDER — GABAPENTIN 300 MG/1
300 CAPSULE ORAL 2 TIMES DAILY
Qty: 60 CAPSULE | Refills: 1 | Status: SHIPPED | OUTPATIENT
Start: 2022-09-28 | End: 2022-11-27

## 2022-09-28 RX ORDER — OXYCODONE AND ACETAMINOPHEN 10; 325 MG/1; MG/1
1 TABLET ORAL EVERY 4 HOURS PRN
Qty: 150 TABLET | Refills: 0 | Status: SHIPPED | OUTPATIENT
Start: 2022-10-07 | End: 2022-10-04 | Stop reason: SDUPTHER

## 2022-09-28 RX ORDER — OXYCODONE AND ACETAMINOPHEN 10; 325 MG/1; MG/1
1 TABLET ORAL EVERY 4 HOURS PRN
Qty: 150 TABLET | Refills: 0 | Status: SHIPPED | OUTPATIENT
Start: 2022-11-06 | End: 2022-10-04 | Stop reason: SDUPTHER

## 2022-09-28 ASSESSMENT — PATIENT HEALTH QUESTIONNAIRE - PHQ9
5. POOR APPETITE OR OVEREATING: 3 - NEARLY EVERY DAY
CLINICAL INTERPRETATION OF PHQ2 SCORE: 2
SUM OF ALL RESPONSES TO PHQ QUESTIONS 1-9: 13

## 2022-09-28 ASSESSMENT — PAIN SCALES - GENERAL: PAINLEVEL: 8=MODERATE-SEVERE PAIN

## 2022-09-28 ASSESSMENT — FIBROSIS 4 INDEX: FIB4 SCORE: 0.64

## 2022-09-28 NOTE — PROGRESS NOTES
Follow up patient note  Pain Medicine, Interventional spine and sports physiatry, Physical medicine rehabilitation    Date of Service:09/28/2022    Chief complaint:   Joint pain and neck pain      HISTORY    Please see new patient note dated 06/08/2018 by Dr Kerr,  for more details.     HPI  Patient identification: Mary Martinez 52 y.o. female returns for follow-up of her pain related to rheumatoid arthritis.      Interval history:   Mary returns for follow-up of her chronic musculoskeletal pain.  Since last visit she has had been hospitalized and had a celiac plexus block performed on September 9, 2022.  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  She reveals that this has been very helpful in helping with her diarrhea and her appetite has been very good.  She has been able to eat food with all types including barbecue sauces.  She only reports some very mild nausea.    Joint pain continues and is stable with use of her pain medication.  She has been able to perform her usual ADLs with less assistance from her family.  Ongoing neck shoulder knee and hand pain.    She also has had some increase in nodules on her Achilles tendons and a painful nodule on her left thumb.    Taking percocet 10/325 five a day.  Bowel movements are regular.    Dr. Dela Cruz is her Rheumatologist.    PHQ-9: 13, denies suicidality, continues to work with her psychology and psychiatry team  ORT: 4    ROS  See HPI    Red Flags :   Fever, Chills, Sweats: Denies  Involuntary Weight Loss: Denies  Bowel/Bladder Incontinence: Denies      PMHx:   Past Medical History:   Diagnosis Date    Acute renal failure (ARF) (HCC)     Allergy     Anemia     Anxiety     Arthritis     Rheumatoid -follows with rheumatologist. Has several fractures due to RA.    ASTHMA     inhalers prn    Blood transfusion without reported diagnosis     Bowel habit changes     4/24/20-constipation and diarrhea.    Bronchitis last approx 2018    Chronic pain  04/24/2020    Due to RA    Dental disorder     no teeth upper-does not wear her denture. Broken teeth on bottom.    Depression     GERD (gastroesophageal reflux disease)     GIB (gastrointestinal bleeding)     Head ache     Heart burn     taking famotidine    Hiatus hernia syndrome     History of pancreatitis     Hypokalemia     Hyponatremia     Idiopathic chronic pancreatitis (HCC)     Indigestion     taking famotidine    Intractable nausea and vomiting     Kidney disease     Pain     CPS-everywhere    Pneumonia 10/2019    PONV (postoperative nausea and vomiting)     Psychiatric problem     anxiety and depression    Rheumatoid aortitis     Rheumatoid arthritis(714.0) 2003    SMAS (superior mesenteric artery syndrome) (HCC)     Substance abuse (HCC)        PSHx:   Past Surgical History:   Procedure Laterality Date    IL UPPER GI ENDOSCOPY,DIAGNOSIS N/A 9/9/2022    Procedure: ENDOSCOPIC ULTRASOUND- UPPER;  Surgeon: Jorge Brooke M.D.;  Location: [facility];  Service: EUS    EGD W/ENDOSCOPIC ULTRASOUND N/A 9/9/2022    Procedure: EGD, WITH ENDOSCOPIC US;  Surgeon: Jorge Brooke M.D.;  Location: [facility];  Service: EUS    IL ENDOSCOPIC US EXAM, ESOPH  4/29/2020    Procedure: EGD, WITH ENDOSCOPIC US;  Surgeon: Jorge Brooke M.D.;  Location: [facility];  Service: Gastroenterology    GASTROSCOPY-ENDO  4/29/2020    Procedure: GASTROSCOPY;  Surgeon: Jorge Brooke M.D.;  Location: [facility];  Service: Gastroenterology    EGD WITH ASP/BX  4/29/2020    Procedure: EGD, WITH ASPIRATION BIOPSY - POSS FNA;  Surgeon: Jorge Brooke M.D.;  Location: [facility];  Service: Gastroenterology    IL EXPLORATORY OF ABDOMEN N/A 10/4/2019    Procedure: LAPAROTOMY, EXPLORATORY AND REPAIR OF DUODENAL PERFORATION;  Surgeon: James Dumont M.D.;  Location: [facility];  Service: General    COLONOSCOPY  2018    with upper  endoscopy    FINGER ARTHROPLASTY Right 6/5/2017    Procedure: FINGER ARTHROPLASTY - LONG, RING AND SMALL VOLAR PLATE;  Surgeon: Lobo Rosen M.D.;  Location: SURGERY SAME DAY [facility];  Service:     FINGER AMPUTATION  6/5/2017    Procedure: FINGER AMPUTATION - LONG, RING AND SMALL AT THE PROXIMAL INTERPHALANGEAL JOINT;  Surgeon: Lobo Rosen M.D.;  Location: SURGERY SAME DAY [facility];  Service:     FINGER ARTHROPLASTY Right 4/17/2017    Procedure: FINGER ARTHROPLASTY ;  Surgeon: Lobo Rosen M.D.;  Location: SURGERY SAME DAY [facility];  Service:     FINGER AMPUTATION Right 9/14/2016    Procedure: FINGER AMPUTATION INDEX;  Surgeon: Lobo Rosen M.D.;  Location: SURGERY SAME DAY [facility];  Service:     IRRIGATION & DEBRIDEMENT ORTHO Right 9/11/2016    Procedure: IRRIGATION & DEBRIDEMENT ORTHO - right index finger;  Surgeon: Madhu Rosen M.D.;  Location: [facility];  Service:     FUSION, PIP JOINT, TOE Right 8/29/2016    Procedure: RE-DO INDEX FINGER PROXIMAL INTERPHALANGEAL ARTHRODESIS;  Surgeon: Lobo Rosen M.D.;  Location: SURGERY SAME DAY [facility];  Service:     BONE GRAFT Right 8/29/2016    Procedure: BONE GRAFT - DISTAL RADIUS ;  Surgeon: Lobo Rosen M.D.;  Location: SURGERY SAME DAY [facility];  Service:     ARTHRODESIS Right 5/6/2016    Procedure: ARTHRODESIS INDEX FINGER PROXIMAL INTERPHALANGEAL;  Surgeon: Lobo Rosen M.D.;  Location: SURGERY SAME DAY [facility];  Service:     FOOT RECONSTRUCTION RHEUMATIC Left 7/29/2015    Procedure: FOOT RECONSTRUCTION RHEUMATIC;  Surgeon: Heriberto Alves M.D.;  Location: [facility];  Service:     TOE FUSION Right 5/27/2015    Procedure: TOE FUSION 1ST METATARSALPHALANGEAL JOINT;  Surgeon: Heriberto Alves M.D.;  Location: [facility];  Service:     BONE SPUR EXCISION  5/27/2015    Procedure: BONE SPUR EXCISION METATARSAL HEAD 2-3;  Surgeon:  Heriberto Alves M.D.;  Location: SURGERY [facility];  Service:     HAMMERTOE CORRECTION  5/27/2015    Procedure: HAMMERTOE CORRECTION AND SOFT TISSUE RE-ALINGMENT 2-3 ;  Surgeon: Heriberto Alves M.D.;  Location: SURGERY [facility];  Service:     DENTAL EXTRACTION(S)  2014    all of upper teeth    ABDOMINAL ABSCESS DRAINAGE  9/27/2011    Performed by VERO WRIGHT at SURGERY [facility]    EMANUEL BY LAPAROSCOPY  9/27/2011    Performed by VERO WRIGHT at SURGERY [facility]    APPENDECTOMY  2011    Found out it had ruptured prior to abcess surgery    REPEAT C SECTION  2008    REPEAT C SECTION  2005    REPEAT C SECTION  1999    PRIMARY C SECTION  1991    TONSILLECTOMY  1982    WRIST EXPLORATION Left 1980's    fractured wrist-no hardware       Family history   Family History   Problem Relation Age of Onset    Cancer Mother     Heart Disease Mother     Hypertension Mother     Heart Disease Father     Hypertension Father          Medications:   Outpatient Medications Marked as Taking for the 9/28/22 encounter (Office Visit) with Jayy Kerr M.D.   Medication Sig Dispense Refill    gabapentin (NEURONTIN) 300 MG Cap Take 1 Capsule by mouth 2 times a day for 60 days. 60 Capsule 1    [START ON 10/7/2022] oxyCODONE-acetaminophen (PERCOCET-10)  MG Tab Take 1 Tablet by mouth every four hours as needed for Severe Pain for up to 30 days. 150 Tablet 0    [START ON 11/6/2022] oxyCODONE-acetaminophen (PERCOCET-10)  MG Tab Take 1 Tablet by mouth every four hours as needed for Severe Pain for up to 30 days. 150 Tablet 0    midodrine (PROAMATINE) 10 MG tablet Take 1 Tablet by mouth 3 times a day with meals. 90 Tablet 0    Cyanocobalamin (B-12) 1000 MCG Tab Take 1,000 mg by mouth every day.      thiamine (THIAMINE) 100 MG tablet Take 1 Tablet by mouth 2 times a day. 60 Tablet 0    folic acid (FOLVITE) 1 MG Tab Take 1 Tablet by mouth 2 times a day. 60 Tablet 0    Etanercept (ENBREL) 25 MG Recon  Soln Inject 50 mg under the skin every 7 days. Tuesday      pantoprazole (PROTONIX) 40 MG Tablet Delayed Response Take 1 Tablet by mouth every day. (Patient taking differently: Take 40 mg by mouth every evening.) 30 Tablet 0    sucralfate (CARAFATE) 1 GM Tab Take 1 Tablet by mouth 4 Times a Day,Before Meals and at Bedtime. 120 Tablet 3    QUEtiapine (SEROQUEL) 25 MG Tab Take 25 mg by mouth 2 times a day as needed for Anxiety.      ondansetron (ZOFRAN ODT) 8 MG TABLET DISPERSIBLE Take 8 mg by mouth every 8 hours as needed for Nausea.      QUEtiapine (SEROQUEL) 100 MG Tab Take 100 mg by mouth every evening.      PARoxetine (PAXIL) 30 MG Tab Take 60 mg by mouth every evening.         Allergies:   Allergies   Allergen Reactions    Penicillins Anaphylaxis and Hives     Tolerates cephalosporins; reports throat swelling with PCN    Aripiprazole Nausea     Spasms, shaking      Nitrous Oxide Vomiting    Abilify      Multiple side effects       Social Hx:   Social History     Socioeconomic History    Marital status:      Spouse name: Ousmane    Number of children: 5    Years of education: Not on file    Highest education level: Not on file   Occupational History    Not on file   Tobacco Use    Smoking status: Every Day     Packs/day: 1.00     Years: 30.00     Pack years: 30.00     Types: Cigarettes    Smokeless tobacco: Never   Vaping Use    Vaping Use: Never used   Substance and Sexual Activity    Alcohol use: Never    Drug use: Never    Sexual activity: Not Currently   Other Topics Concern     Service No    Blood Transfusions Yes    Caffeine Concern No    Occupational Exposure No    Hobby Hazards No    Sleep Concern No    Stress Concern Yes    Weight Concern No    Special Diet No    Back Care No    Exercise Yes    Bike Helmet Yes    Seat Belt Yes    Self-Exams Yes   Social History Narrative    ** Merged History Encounter **          Social Determinants of Health     Financial Resource Strain: Low Risk      "Difficulty of Paying Living Expenses: Not hard at all   Food Insecurity: No Food Insecurity    Worried About Running Out of Food in the Last Year: Never true    Ran Out of Food in the Last Year: Never true   Transportation Needs: No Transportation Needs    Lack of Transportation (Medical): No    Lack of Transportation (Non-Medical): No   Physical Activity: Not on file   Stress: Not on file   Social Connections: Not on file   Intimate Partner Violence: Not on file   Housing Stability: Not on file         EXAMINATION     Physical Exam:   Vitals: /64 (BP Location: Right arm, Patient Position: Sitting, BP Cuff Size: Adult long)   Pulse 88   Temp 36.7 °C (98 °F) (Temporal)   Ht 1.6 m (5' 3\")   Wt 47.8 kg (105 lb 6.4 oz)   SpO2 95%     Constitutional:   Body Habitus: Body mass index is 18.67 kg/m².  Cooperation: Fully cooperates with exam  Appearance: Appears pale, thin.  She is wearing a mask  Respiratory-  breathing comfortable on room air, no audible wheezing  Cardiovascular- no lower extremity edema is observed.     Psychiatric- alert and oriented ×3. Normal affect.     Musculoskeletal:    Partial hand amputation on the right at the MCPs; ulnar deviation on the left with MCP subluxation, mild atrophy of the hand intrinsics with wasting of the thenar eminence.  Limited range of motion of the left hand performing .  Left thumb with tenderness to palpation and nodule just distal to the DIP joint.    Nonpainful nodules over the Achilles tendons bilaterally    Gait is steady without assist device.  Mildly antalgic without loss of balance      MEDICAL DECISION MAKING    DATA    Labs: UDS 10/30/2018 consistent with medications.  Oxycodone and metabolites without other substances   UDS 04/19/2019 consistent with medications. Oxycodone and metabolites without other substances   UDS 07/19/2019 consistent with medications.  Oxycodone and metabolites without other substances   UDS 11/22/2019 consistent with " medications.  Oxycodone and metabolites without other substances   UDS 02/20/2020 absent for Oxycodone and metabolites without other substances  UDS 06/10/2020 positive for oxycodone and metabolites without other substances  UDS 08/18/2020 positive for oxycodone and metabolites, positive for EtG without EtS  UDS 09/07/2021 negative for oxycodone and metabolites, patient was out of medication, no other abnormalities  UDS 05/18/2022: positive for oxycodone and metabolites.      Imaging:    Films reviewed.  These are my reads:    Xray right shoulder 08/20/2020  There is note of severe degenerative change of the glenohumeral joint.  No fracture.  Erosive changes, consistent with known history of RA    MRI cervical spine 07/31/2018:    There is is note of motion at the atlantodental level with 5mm atlantodental inverval in flexion that reduces to 1-2 mm in extension.  Mild retrolisthesis of C4 on C5 and anterolisthesis of C5- on C6.  Disc extrusion at C6-7 with mild central canal stenosis    Xray left shoulder 2/5/2018 Humeral head is elevated.  Glenohumeral joint arthritis.    Reports reviewed:    Xray right shoulder 08/20/2020 [facility]  Impression  Extensive erosive changes of the humeral head and degenerative changes of the glenohumeral joint.  Findings are concerning for inflammatory arthropathy such as rheumatoid arthritis.      Xray cervical spine 11/14/2018  1. No acute fracture  2. Persistent motion at the C1-2 joint as before, suggesting atlantoaxial instability  3. Minimal instability noted at C5-6 level as described.    MRI cervical spine 07/31/2018  1. Widening of the atlantodental interal in neutral and flexion positions with a 5mm space which reduces to 1-2 mm in extension  2. 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 Chronic pain syndrome  G89.4   2. Rheumatoid arthritis involving multiple sites with positive rheumatoid factor (HCC)  M05.79   3. Arthritis of glenohumeral joint  M19.019   4. Hand deformity, acquired, left  M21.942   5. 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 Depression, unspecified depression type  F32.A   7. Chronic, continuous use of opioids  F11.90   8. Pain of left thumb  M79.645           ASSESSMENT and PLAN:     Mary Martinez 52 y.o. female with rheumatoid arthritis    Mary was seen today for follow-up.    Orders and management for this visit:    Orders Placed This Encounter    DX-HAND 3+ LEFT    Patient has been identified as having a positive depression screening. Appropriate orders and counseling have been given.    gabapentin (NEURONTIN) 300 MG Cap    oxyCODONE-acetaminophen (PERCOCET-10)  MG Tab    oxyCODONE-acetaminophen (PERCOCET-10)  MG Tab    Consent for Opiate Prescription    Controlled Substance Treatment Agreement       Discussed that she has been experiencing improvement after celiac plexus block performed by GI on September 9.  Encouraged her to follow-up with GI.  This is not a procedure that I performed and this was needed to be repeated in the future, this could be done with GI or with interventional radiology.  Discussed that she will soon see Dr. Dela Cruz.  X-ray of the left hand ordered.    Continue activity as tolerated.  Medications help maintain her ADLs, although she does get some help from family, requiring less recently.  Discussed continuing gabapentin 300 mg twice a day.  This may allow her to start reducing Percocet.  We discussed working on cutting pill back by 1/2 pill a day as tolerated.  We will reassess this in 2 months.   reviewed.  Most recent drug screen from May 18, 2022..  Continue Percocet 10/325 number 150/month and we will reassess progress with weaning anticipate goal of gradually reducing pain  medication.  Mood is stable.  She has ongoing depression and does work with counseling and psychiatrist.  Denies suicidality.  2 months    In prescribing controlled substances to this patient, I certify that I have obtained and reviewed the medical history of Mary Martinez. I have also made a good aristides effort to obtain applicable records from other providers who have treated the patient and records did not demonstrate any increased risk of substance abuse that would prevent me from prescribing controlled substances.     I have conducted a physical exam and documented it. I have reviewed Ms. Martinez’s prescription history as maintained by the [place] Prescription Monitoring Program.   ORT 4, indicates moderate risk    I have assessed the patient’s risk for abuse, dependency, and addiction using the validated Opioid Risk Tool available at https://www.mdcalc.com/wexfxm-dptb-knik-ort-narcotic-abuse.     Given the above, I believe the benefits of controlled substance therapy outweigh the risks. The reasons for prescribing controlled substances include non-narcotic, oral analgesic alternatives have been inadequate for pain control and in my professional opinion, controlled substances are the only reasonable choice for this patient because her pain was note adequately controlled with alternatives  and she has chronic progressive disease. Accordingly, I have discussed the risk and benefits, treatment plan, and alternative therapies with the patient.       Follow up:2 months    Please note that this dictation was created using voice recognition software. I have made every reasonable attempt to correct obvious errors but there may be errors of grammar and content that I may have overlooked prior to finalization of this note.      Jayy Kerr MD  Interventional Spine and Sports Physiatry  Physical Medicine and Rehabilitation  [facility]      PCP: Dr. Fidencio Christopher/[facility]

## 2022-10-04 ENCOUNTER — TELEPHONE (OUTPATIENT)
Dept: PHYSICAL MEDICINE AND REHAB | Facility: MEDICAL CENTER | Age: 52
End: 2022-10-04
Payer: MEDICAID

## 2022-10-04 DIAGNOSIS — F11.90 CHRONIC, CONTINUOUS USE OF OPIOIDS: ICD-10-CM

## 2022-10-04 DIAGNOSIS — M05.79 RHEUMATOID ARTHRITIS INVOLVING MULTIPLE SITES WITH POSITIVE RHEUMATOID FACTOR (HCC): ICD-10-CM

## 2022-10-04 DIAGNOSIS — M19.019 ARTHRITIS OF GLENOHUMERAL JOINT: ICD-10-CM

## 2022-10-04 RX ORDER — OXYCODONE AND ACETAMINOPHEN 10; 325 MG/1; MG/1
1 TABLET ORAL EVERY 4 HOURS PRN
Qty: 150 TABLET | Refills: 0 | Status: SHIPPED | OUTPATIENT
Start: 2022-11-03 | End: 2022-12-03

## 2022-10-04 RX ORDER — OXYCODONE AND ACETAMINOPHEN 10; 325 MG/1; MG/1
1 TABLET ORAL EVERY 4 HOURS PRN
Qty: 150 TABLET | Refills: 0 | Status: SHIPPED | OUTPATIENT
Start: 2022-10-04 | End: 2022-11-03

## 2022-10-04 NOTE — TELEPHONE ENCOUNTER
I called patient to let her to know  Dr Kerr send the Percocet to the pharmacy starting today 10/4//2022 and also I mentioned to her be aware to take the medication according with the instructions, patient was agreed.

## 2023-01-19 ENCOUNTER — HOSPITAL ENCOUNTER (INPATIENT)
Facility: MEDICAL CENTER | Age: 53
LOS: 6 days | DRG: 393 | End: 2023-01-25
Attending: EMERGENCY MEDICINE | Admitting: HOSPITALIST
Payer: MEDICAID

## 2023-01-19 ENCOUNTER — APPOINTMENT (OUTPATIENT)
Dept: RADIOLOGY | Facility: MEDICAL CENTER | Age: 53
DRG: 393 | End: 2023-01-19
Attending: EMERGENCY MEDICINE
Payer: MEDICAID

## 2023-01-19 DIAGNOSIS — R65.10 SIRS (SYSTEMIC INFLAMMATORY RESPONSE SYNDROME) (HCC): ICD-10-CM

## 2023-01-19 DIAGNOSIS — K55.1 SMAS (SUPERIOR MESENTERIC ARTERY SYNDROME) (HCC): ICD-10-CM

## 2023-01-19 DIAGNOSIS — D72.829 LEUKOCYTOSIS, UNSPECIFIED TYPE: ICD-10-CM

## 2023-01-19 DIAGNOSIS — E87.20 METABOLIC ACIDOSIS: ICD-10-CM

## 2023-01-19 DIAGNOSIS — R10.84 GENERALIZED ABDOMINAL PAIN: ICD-10-CM

## 2023-01-19 DIAGNOSIS — E43 PROTEIN-CALORIE MALNUTRITION, SEVERE (HCC): ICD-10-CM

## 2023-01-19 PROBLEM — E87.29 STARVATION KETOACIDOSIS: Status: ACTIVE | Noted: 2023-01-19

## 2023-01-19 PROBLEM — T73.0XXA STARVATION KETOACIDOSIS: Status: ACTIVE | Noted: 2023-01-19

## 2023-01-19 PROBLEM — R19.7 DIARRHEA: Status: ACTIVE | Noted: 2023-01-19

## 2023-01-19 PROBLEM — F31.9 BIPOLAR 1 DISORDER (HCC): Status: ACTIVE | Noted: 2023-01-19

## 2023-01-19 LAB
ALBUMIN SERPL BCP-MCNC: 4 G/DL (ref 3.2–4.9)
ALBUMIN/GLOB SERPL: 1.2 G/DL
ALP SERPL-CCNC: 135 U/L (ref 30–99)
ALT SERPL-CCNC: 5 U/L (ref 2–50)
AMMONIA PLAS-SCNC: 19 UMOL/L (ref 11–45)
ANION GAP SERPL CALC-SCNC: 20 MMOL/L (ref 7–16)
APPEARANCE UR: CLEAR
AST SERPL-CCNC: 9 U/L (ref 12–45)
BACTERIA #/AREA URNS HPF: NEGATIVE /HPF
BASOPHILS # BLD AUTO: 0.5 % (ref 0–1.8)
BASOPHILS # BLD: 0.1 K/UL (ref 0–0.12)
BILIRUB SERPL-MCNC: <0.2 MG/DL (ref 0.1–1.5)
BILIRUB UR QL STRIP.AUTO: NEGATIVE
BUN SERPL-MCNC: 12 MG/DL (ref 8–22)
CALCIUM ALBUM COR SERPL-MCNC: 9 MG/DL (ref 8.5–10.5)
CALCIUM SERPL-MCNC: 9 MG/DL (ref 8.5–10.5)
CHLORIDE SERPL-SCNC: 108 MMOL/L (ref 96–112)
CO2 SERPL-SCNC: 7 MMOL/L (ref 20–33)
COLOR UR: YELLOW
CREAT SERPL-MCNC: 1.06 MG/DL (ref 0.5–1.4)
EKG IMPRESSION: NORMAL
EOSINOPHIL # BLD AUTO: 0.12 K/UL (ref 0–0.51)
EOSINOPHIL NFR BLD: 0.6 % (ref 0–6.9)
EPI CELLS #/AREA URNS HPF: NORMAL /HPF
ERYTHROCYTE [DISTWIDTH] IN BLOOD BY AUTOMATED COUNT: 57.7 FL (ref 35.9–50)
ERYTHROCYTE [SEDIMENTATION RATE] IN BLOOD BY WESTERGREN METHOD: >140 MM/HOUR (ref 0–25)
ETHANOL BLD-MCNC: <10.1 MG/DL
GFR SERPLBLD CREATININE-BSD FMLA CKD-EPI: 64 ML/MIN/1.73 M 2
GLOBULIN SER CALC-MCNC: 3.4 G/DL (ref 1.9–3.5)
GLUCOSE SERPL-MCNC: 92 MG/DL (ref 65–99)
GLUCOSE UR STRIP.AUTO-MCNC: NEGATIVE MG/DL
HCT VFR BLD AUTO: 41.3 % (ref 37–47)
HGB BLD-MCNC: 13.5 G/DL (ref 12–16)
HYALINE CASTS #/AREA URNS LPF: NORMAL /LPF
IMM GRANULOCYTES # BLD AUTO: 0.09 K/UL (ref 0–0.11)
IMM GRANULOCYTES NFR BLD AUTO: 0.4 % (ref 0–0.9)
KETONES UR STRIP.AUTO-MCNC: NEGATIVE MG/DL
LACTATE SERPL-SCNC: 1.3 MMOL/L (ref 0.5–2)
LEUKOCYTE ESTERASE UR QL STRIP.AUTO: NEGATIVE
LIPASE SERPL-CCNC: 21 U/L (ref 11–82)
LYMPHOCYTES # BLD AUTO: 0.96 K/UL (ref 1–4.8)
LYMPHOCYTES NFR BLD: 4.8 % (ref 22–41)
MAGNESIUM SERPL-MCNC: 2 MG/DL (ref 1.5–2.5)
MCH RBC QN AUTO: 32.5 PG (ref 27–33)
MCHC RBC AUTO-ENTMCNC: 32.7 G/DL (ref 33.6–35)
MCV RBC AUTO: 99.3 FL (ref 81.4–97.8)
MICRO URNS: ABNORMAL
MONOCYTES # BLD AUTO: 1.13 K/UL (ref 0–0.85)
MONOCYTES NFR BLD AUTO: 5.6 % (ref 0–13.4)
NEUTROPHILS # BLD AUTO: 17.8 K/UL (ref 2–7.15)
NEUTROPHILS NFR BLD: 88.1 % (ref 44–72)
NITRITE UR QL STRIP.AUTO: NEGATIVE
NRBC # BLD AUTO: 0 K/UL
NRBC BLD-RTO: 0 /100 WBC
PH UR STRIP.AUTO: 6.5 [PH] (ref 5–8)
PHOSPHATE SERPL-MCNC: 4.6 MG/DL (ref 2.5–4.5)
PLATELET # BLD AUTO: 393 K/UL (ref 164–446)
PMV BLD AUTO: 10.6 FL (ref 9–12.9)
POTASSIUM SERPL-SCNC: 3.4 MMOL/L (ref 3.6–5.5)
PROCALCITONIN SERPL-MCNC: 0.81 NG/ML
PROCALCITONIN SERPL-MCNC: 0.87 NG/ML
PROT SERPL-MCNC: 7.4 G/DL (ref 6–8.2)
PROT UR QL STRIP: 30 MG/DL
RBC # BLD AUTO: 4.16 M/UL (ref 4.2–5.4)
RBC # URNS HPF: NORMAL /HPF
RBC UR QL AUTO: ABNORMAL
SALICYLATES SERPL-MCNC: <1 MG/DL (ref 15–25)
SODIUM SERPL-SCNC: 135 MMOL/L (ref 135–145)
SP GR UR STRIP.AUTO: 1.02
TROPONIN T SERPL-MCNC: 14 NG/L (ref 6–19)
UROBILINOGEN UR STRIP.AUTO-MCNC: 0.2 MG/DL
WBC # BLD AUTO: 20.2 K/UL (ref 4.8–10.8)
WBC #/AREA URNS HPF: NORMAL /HPF

## 2023-01-19 PROCEDURE — 83735 ASSAY OF MAGNESIUM: CPT

## 2023-01-19 PROCEDURE — 83540 ASSAY OF IRON: CPT

## 2023-01-19 PROCEDURE — 99285 EMERGENCY DEPT VISIT HI MDM: CPT

## 2023-01-19 PROCEDURE — 82140 ASSAY OF AMMONIA: CPT

## 2023-01-19 PROCEDURE — 83605 ASSAY OF LACTIC ACID: CPT | Mod: 91

## 2023-01-19 PROCEDURE — 81001 URINALYSIS AUTO W/SCOPE: CPT

## 2023-01-19 PROCEDURE — 84145 PROCALCITONIN (PCT): CPT | Mod: 91

## 2023-01-19 PROCEDURE — A9270 NON-COVERED ITEM OR SERVICE: HCPCS | Performed by: HOSPITALIST

## 2023-01-19 PROCEDURE — 74175 CTA ABDOMEN W/CONTRAST: CPT

## 2023-01-19 PROCEDURE — 85025 COMPLETE CBC W/AUTO DIFF WBC: CPT | Mod: 91

## 2023-01-19 PROCEDURE — 700111 HCHG RX REV CODE 636 W/ 250 OVERRIDE (IP): Performed by: HOSPITALIST

## 2023-01-19 PROCEDURE — 82652 VIT D 1 25-DIHYDROXY: CPT

## 2023-01-19 PROCEDURE — 770020 HCHG ROOM/CARE - TELE (206)

## 2023-01-19 PROCEDURE — 84484 ASSAY OF TROPONIN QUANT: CPT

## 2023-01-19 PROCEDURE — 96375 TX/PRO/DX INJ NEW DRUG ADDON: CPT

## 2023-01-19 PROCEDURE — 83550 IRON BINDING TEST: CPT

## 2023-01-19 PROCEDURE — 36415 COLL VENOUS BLD VENIPUNCTURE: CPT

## 2023-01-19 PROCEDURE — 82533 TOTAL CORTISOL: CPT

## 2023-01-19 PROCEDURE — 93005 ELECTROCARDIOGRAM TRACING: CPT | Performed by: EMERGENCY MEDICINE

## 2023-01-19 PROCEDURE — 82330 ASSAY OF CALCIUM: CPT

## 2023-01-19 PROCEDURE — 700105 HCHG RX REV CODE 258: Performed by: HOSPITALIST

## 2023-01-19 PROCEDURE — 82077 ASSAY SPEC XCP UR&BREATH IA: CPT

## 2023-01-19 PROCEDURE — 84443 ASSAY THYROID STIM HORMONE: CPT

## 2023-01-19 PROCEDURE — 700105 HCHG RX REV CODE 258: Performed by: EMERGENCY MEDICINE

## 2023-01-19 PROCEDURE — 700117 HCHG RX CONTRAST REV CODE 255: Performed by: EMERGENCY MEDICINE

## 2023-01-19 PROCEDURE — 700102 HCHG RX REV CODE 250 W/ 637 OVERRIDE(OP): Performed by: HOSPITALIST

## 2023-01-19 PROCEDURE — 80053 COMPREHEN METABOLIC PANEL: CPT | Mod: 91

## 2023-01-19 PROCEDURE — 83690 ASSAY OF LIPASE: CPT

## 2023-01-19 PROCEDURE — 99223 1ST HOSP IP/OBS HIGH 75: CPT | Performed by: HOSPITALIST

## 2023-01-19 PROCEDURE — 700105 HCHG RX REV CODE 258

## 2023-01-19 PROCEDURE — 80179 DRUG ASSAY SALICYLATE: CPT

## 2023-01-19 PROCEDURE — 700101 HCHG RX REV CODE 250: Performed by: HOSPITALIST

## 2023-01-19 PROCEDURE — 700111 HCHG RX REV CODE 636 W/ 250 OVERRIDE (IP): Performed by: EMERGENCY MEDICINE

## 2023-01-19 PROCEDURE — 96365 THER/PROPH/DIAG IV INF INIT: CPT

## 2023-01-19 PROCEDURE — 87040 BLOOD CULTURE FOR BACTERIA: CPT | Mod: 91

## 2023-01-19 PROCEDURE — 85652 RBC SED RATE AUTOMATED: CPT

## 2023-01-19 PROCEDURE — 84100 ASSAY OF PHOSPHORUS: CPT

## 2023-01-19 PROCEDURE — 82728 ASSAY OF FERRITIN: CPT

## 2023-01-19 PROCEDURE — 83970 ASSAY OF PARATHORMONE: CPT

## 2023-01-19 PROCEDURE — 82607 VITAMIN B-12: CPT

## 2023-01-19 RX ORDER — IBUPROFEN 200 MG
TABLET ORAL EVERY 4 HOURS PRN
Status: ON HOLD | COMMUNITY
End: 2023-01-25

## 2023-01-19 RX ORDER — SODIUM CHLORIDE 9 MG/ML
500 INJECTION, SOLUTION INTRAVENOUS ONCE
Status: COMPLETED | OUTPATIENT
Start: 2023-01-19 | End: 2023-01-20

## 2023-01-19 RX ORDER — PROMETHAZINE HYDROCHLORIDE 25 MG/1
12.5-25 SUPPOSITORY RECTAL EVERY 4 HOURS PRN
Status: DISCONTINUED | OUTPATIENT
Start: 2023-01-19 | End: 2023-01-25 | Stop reason: HOSPADM

## 2023-01-19 RX ORDER — ONDANSETRON 2 MG/ML
4 INJECTION INTRAMUSCULAR; INTRAVENOUS ONCE
Status: COMPLETED | OUTPATIENT
Start: 2023-01-19 | End: 2023-01-19

## 2023-01-19 RX ORDER — MIDODRINE HYDROCHLORIDE 5 MG/1
10 TABLET ORAL
Status: DISCONTINUED | OUTPATIENT
Start: 2023-01-19 | End: 2023-01-20

## 2023-01-19 RX ORDER — BISACODYL 10 MG
10 SUPPOSITORY, RECTAL RECTAL
Status: DISCONTINUED | OUTPATIENT
Start: 2023-01-19 | End: 2023-01-20

## 2023-01-19 RX ORDER — OXYCODONE HYDROCHLORIDE 5 MG/1
5 TABLET ORAL
Status: DISCONTINUED | OUTPATIENT
Start: 2023-01-19 | End: 2023-01-20

## 2023-01-19 RX ORDER — PROMETHAZINE HYDROCHLORIDE 25 MG/1
12.5-25 TABLET ORAL EVERY 4 HOURS PRN
Status: DISCONTINUED | OUTPATIENT
Start: 2023-01-19 | End: 2023-01-20

## 2023-01-19 RX ORDER — PROCHLORPERAZINE EDISYLATE 5 MG/ML
5-10 INJECTION INTRAMUSCULAR; INTRAVENOUS EVERY 4 HOURS PRN
Status: DISCONTINUED | OUTPATIENT
Start: 2023-01-19 | End: 2023-01-25 | Stop reason: HOSPADM

## 2023-01-19 RX ORDER — ONDANSETRON 2 MG/ML
4 INJECTION INTRAMUSCULAR; INTRAVENOUS EVERY 4 HOURS PRN
Status: DISCONTINUED | OUTPATIENT
Start: 2023-01-19 | End: 2023-01-25 | Stop reason: HOSPADM

## 2023-01-19 RX ORDER — AMOXICILLIN 250 MG
2 CAPSULE ORAL 2 TIMES DAILY
Status: DISCONTINUED | OUTPATIENT
Start: 2023-01-19 | End: 2023-01-20

## 2023-01-19 RX ORDER — FOLIC ACID 1 MG/1
1 TABLET ORAL 2 TIMES DAILY
Status: DISCONTINUED | OUTPATIENT
Start: 2023-01-19 | End: 2023-01-20

## 2023-01-19 RX ORDER — DEXTROSE AND SODIUM CHLORIDE 5; .9 G/100ML; G/100ML
INJECTION, SOLUTION INTRAVENOUS CONTINUOUS
Status: DISCONTINUED | OUTPATIENT
Start: 2023-01-19 | End: 2023-01-20

## 2023-01-19 RX ORDER — METRONIDAZOLE 500 MG/100ML
500 INJECTION, SOLUTION INTRAVENOUS EVERY 8 HOURS
Status: DISCONTINUED | OUTPATIENT
Start: 2023-01-19 | End: 2023-01-20

## 2023-01-19 RX ORDER — QUETIAPINE FUMARATE 25 MG/1
25 TABLET, FILM COATED ORAL 2 TIMES DAILY PRN
Status: DISCONTINUED | OUTPATIENT
Start: 2023-01-19 | End: 2023-01-20

## 2023-01-19 RX ORDER — QUETIAPINE FUMARATE 100 MG/1
100 TABLET, FILM COATED ORAL EVERY EVENING
Status: DISCONTINUED | OUTPATIENT
Start: 2023-01-19 | End: 2023-01-20

## 2023-01-19 RX ORDER — ONDANSETRON 4 MG/1
4 TABLET, ORALLY DISINTEGRATING ORAL EVERY 4 HOURS PRN
Status: DISCONTINUED | OUTPATIENT
Start: 2023-01-19 | End: 2023-01-20

## 2023-01-19 RX ORDER — OXYCODONE HYDROCHLORIDE 10 MG/1
10 TABLET ORAL
Status: DISCONTINUED | OUTPATIENT
Start: 2023-01-19 | End: 2023-01-20

## 2023-01-19 RX ORDER — POTASSIUM CHLORIDE 7.45 MG/ML
10 INJECTION INTRAVENOUS
Status: COMPLETED | OUTPATIENT
Start: 2023-01-19 | End: 2023-01-20

## 2023-01-19 RX ORDER — ACETAMINOPHEN 325 MG/1
650 TABLET ORAL EVERY 6 HOURS PRN
Status: DISCONTINUED | OUTPATIENT
Start: 2023-01-19 | End: 2023-01-20

## 2023-01-19 RX ORDER — OXYCODONE HYDROCHLORIDE 5 MG/1
2.5 TABLET ORAL
Status: DISCONTINUED | OUTPATIENT
Start: 2023-01-19 | End: 2023-01-19

## 2023-01-19 RX ORDER — MORPHINE SULFATE 4 MG/ML
2 INJECTION INTRAVENOUS
Status: DISCONTINUED | OUTPATIENT
Start: 2023-01-19 | End: 2023-01-20

## 2023-01-19 RX ORDER — OXYCODONE HYDROCHLORIDE 5 MG/1
5 TABLET ORAL
Status: DISCONTINUED | OUTPATIENT
Start: 2023-01-19 | End: 2023-01-19

## 2023-01-19 RX ORDER — MORPHINE SULFATE 4 MG/ML
4 INJECTION INTRAVENOUS ONCE
Status: COMPLETED | OUTPATIENT
Start: 2023-01-19 | End: 2023-01-19

## 2023-01-19 RX ORDER — PAROXETINE HYDROCHLORIDE 20 MG/1
30 TABLET, FILM COATED ORAL EVERY EVENING
Status: DISCONTINUED | OUTPATIENT
Start: 2023-01-19 | End: 2023-01-20

## 2023-01-19 RX ORDER — SODIUM CHLORIDE 9 MG/ML
500 INJECTION, SOLUTION INTRAVENOUS ONCE
Status: COMPLETED | OUTPATIENT
Start: 2023-01-19 | End: 2023-01-19

## 2023-01-19 RX ORDER — POLYETHYLENE GLYCOL 3350 17 G/17G
1 POWDER, FOR SOLUTION ORAL
Status: DISCONTINUED | OUTPATIENT
Start: 2023-01-19 | End: 2023-01-20

## 2023-01-19 RX ORDER — OMEPRAZOLE 20 MG/1
40 CAPSULE, DELAYED RELEASE ORAL DAILY
Status: DISCONTINUED | OUTPATIENT
Start: 2023-01-19 | End: 2023-01-20

## 2023-01-19 RX ORDER — SODIUM CHLORIDE 9 MG/ML
1000 INJECTION, SOLUTION INTRAVENOUS ONCE
Status: COMPLETED | OUTPATIENT
Start: 2023-01-19 | End: 2023-01-19

## 2023-01-19 RX ORDER — POTASSIUM CHLORIDE 7.45 MG/ML
10 INJECTION INTRAVENOUS
Status: DISPENSED | OUTPATIENT
Start: 2023-01-19 | End: 2023-01-19

## 2023-01-19 RX ORDER — MORPHINE SULFATE 4 MG/ML
2 INJECTION INTRAVENOUS
Status: DISCONTINUED | OUTPATIENT
Start: 2023-01-19 | End: 2023-01-19

## 2023-01-19 RX ADMIN — SODIUM CHLORIDE 1000 ML: 9 INJECTION, SOLUTION INTRAVENOUS at 13:51

## 2023-01-19 RX ADMIN — MORPHINE SULFATE 4 MG: 4 INJECTION INTRAVENOUS at 13:51

## 2023-01-19 RX ADMIN — SODIUM CHLORIDE 500 ML: 9 INJECTION, SOLUTION INTRAVENOUS at 23:07

## 2023-01-19 RX ADMIN — SODIUM CHLORIDE 1000 ML: 9 INJECTION, SOLUTION INTRAVENOUS at 20:37

## 2023-01-19 RX ADMIN — FOLIC ACID 1 MG: 1 TABLET ORAL at 19:26

## 2023-01-19 RX ADMIN — ONDANSETRON 4 MG: 2 INJECTION INTRAMUSCULAR; INTRAVENOUS at 21:51

## 2023-01-19 RX ADMIN — CEFTRIAXONE SODIUM 1000 MG: 10 INJECTION, POWDER, FOR SOLUTION INTRAVENOUS at 20:52

## 2023-01-19 RX ADMIN — OXYCODONE HYDROCHLORIDE 10 MG: 10 TABLET ORAL at 20:59

## 2023-01-19 RX ADMIN — MIDODRINE HYDROCHLORIDE 10 MG: 5 TABLET ORAL at 19:26

## 2023-01-19 RX ADMIN — PAROXETINE HYDROCHLORIDE 30 MG: 10 TABLET, FILM COATED ORAL at 19:26

## 2023-01-19 RX ADMIN — DEXTROSE AND SODIUM CHLORIDE 100 ML: 5; 900 INJECTION, SOLUTION INTRAVENOUS at 21:51

## 2023-01-19 RX ADMIN — POTASSIUM CHLORIDE 10 MEQ: 7.45 INJECTION INTRAVENOUS at 19:22

## 2023-01-19 RX ADMIN — PROMETHAZINE HYDROCHLORIDE 25 MG: 25 TABLET ORAL at 23:10

## 2023-01-19 RX ADMIN — THIAMINE HYDROCHLORIDE: 100 INJECTION, SOLUTION INTRAMUSCULAR; INTRAVENOUS at 17:11

## 2023-01-19 RX ADMIN — SODIUM CHLORIDE 500 ML: 9 INJECTION, SOLUTION INTRAVENOUS at 22:24

## 2023-01-19 RX ADMIN — OMEPRAZOLE 40 MG: 20 CAPSULE, DELAYED RELEASE ORAL at 19:26

## 2023-01-19 RX ADMIN — QUETIAPINE FUMARATE 100 MG: 25 TABLET ORAL at 19:26

## 2023-01-19 RX ADMIN — METRONIDAZOLE 500 MG: 5 INJECTION, SOLUTION INTRAVENOUS at 20:05

## 2023-01-19 RX ADMIN — ONDANSETRON 4 MG: 2 INJECTION INTRAMUSCULAR; INTRAVENOUS at 13:51

## 2023-01-19 RX ADMIN — POTASSIUM CHLORIDE 10 MEQ: 7.46 INJECTION, SOLUTION INTRAVENOUS at 21:25

## 2023-01-19 RX ADMIN — IOHEXOL 100 ML: 350 INJECTION, SOLUTION INTRAVENOUS at 16:30

## 2023-01-19 RX ADMIN — OXYCODONE HYDROCHLORIDE 5 MG: 5 TABLET ORAL at 17:52

## 2023-01-19 ASSESSMENT — ENCOUNTER SYMPTOMS
FEVER: 0
EYE PAIN: 0
COUGH: 0
HALLUCINATIONS: 0
PND: 0
SHORTNESS OF BREATH: 0
SPUTUM PRODUCTION: 0
BRUISES/BLEEDS EASILY: 0
CHILLS: 0
DIZZINESS: 0
PHOTOPHOBIA: 0
BLOOD IN STOOL: 0
SINUS PAIN: 0
VOMITING: 1
CONSTIPATION: 0
TREMORS: 0
CLAUDICATION: 0
PALPITATIONS: 0
FALLS: 0
DIARRHEA: 1
DIAPHORESIS: 0
HEADACHES: 0
SORE THROAT: 0
STRIDOR: 0
BACK PAIN: 0
ABDOMINAL PAIN: 1
NAUSEA: 1
WHEEZING: 0
POLYDIPSIA: 0
HEARTBURN: 0
TINGLING: 0
WEAKNESS: 0
HEMOPTYSIS: 0
NECK PAIN: 0
ORTHOPNEA: 0
DOUBLE VISION: 0
BLURRED VISION: 0
MYALGIAS: 0
FLANK PAIN: 0
DEPRESSION: 0

## 2023-01-19 ASSESSMENT — LIFESTYLE VARIABLES: SUBSTANCE_ABUSE: 0

## 2023-01-19 ASSESSMENT — FIBROSIS 4 INDEX
FIB4 SCORE: 0.51
FIB4 SCORE: 0.64

## 2023-01-19 ASSESSMENT — PAIN DESCRIPTION - PAIN TYPE: TYPE: ACUTE PAIN;CHRONIC PAIN

## 2023-01-19 NOTE — ED TRIAGE NOTES
Chief Complaint   Patient presents with    Abdominal Pain    Back Pain     Pain started 1 week ago.  Also c/o n/v/d.  Pt states back pain is from neck to mid back

## 2023-01-19 NOTE — ED PROVIDER NOTES
ED Provider Note    Primary care provider: Fidencio Christopher D.O.  Means of arrival: POV  History obtained from: Patient    CHIEF COMPLAINT  Chief Complaint   Patient presents with    Abdominal Pain    Back Pain     Pain started 1 week ago.  Also c/o n/v/d.  Pt states back pain is from neck to mid back     Seen at 1:03 PM.   HPI  Mary Martinez is a 52 y.o. female who presents to the Emergency Department for severe cramping upper abdominal pain, also some less severe lower back pain and posterior neck pain, all beginning around the same time 1 week ago.  The patient's abdominal pain feels very similar to the chronic abdominal pain she had previously.  She did very well with a celiac plexus block, she lost her insurance shortly thereafter and has been off all opioid medications for the past 60 days.  Her pain has been very minimal despite being off all pain medications until this began last week.  She denies any fevers, chills.  She has had some nausea, vomiting and diarrhea, but this began yesterday.  She denies any dysuria or urinary incontinence.  She denies any melena or hematochezia.    She does note some decree sensation in the bilateral ankles and feet, this has been present since yesterday, she also feels weakness in the bilateral lower extremities.    She previously was on Enbrel for rheumatoid arthritis but this has been off for at least a month secondary to lack of insurance.  She has insurance now and has a scheduled follow-up with her pain management tomorrow.    REVIEW OF SYSTEMS  See HPI.     PAST MEDICAL HISTORY   has a past medical history of Acute renal failure (ARF) ([facility]), Allergy, Anemia, Anxiety, Arthritis, ASTHMA, Blood transfusion without reported diagnosis, Bowel habit changes, Bronchitis (last approx 2018), Chronic pain (04/24/2020), Dental disorder, Depression, GERD (gastroesophageal reflux disease), GIB (gastrointestinal bleeding), Head ache, Heart burn, Hiatus hernia syndrome, History  of pancreatitis, Hypokalemia, Hyponatremia, Idiopathic chronic pancreatitis (HCC), Indigestion, Intractable nausea and vomiting, Kidney disease, Pain, Pneumonia (10/2019), PONV (postoperative nausea and vomiting), Psychiatric problem, Rheumatoid aortitis, Rheumatoid arthritis(714.0) (2003), SMAS (superior mesenteric artery syndrome) (HCC), and Substance abuse (HCC).    SURGICAL HISTORY   has a past surgical history that includes abdominal abscess drainage (9/27/2011); toe fusion (Right, 5/27/2015); bone spur excision (5/27/2015); hammertoe correction (5/27/2015); foot reconstruction rheumatic (Left, 7/29/2015); arthrodesis (Right, 5/6/2016); fusion, pip joint, toe (Right, 8/29/2016); bone graft (Right, 8/29/2016); irrigation & debridement ortho (Right, 9/11/2016); finger amputation (Right, 9/14/2016); finger arthroplasty (Right, 4/17/2017); finger arthroplasty (Right, 6/5/2017); finger amputation (6/5/2017); exploratory of abdomen (N/A, 10/4/2019); tonsillectomy (1982); primary c section (1991); repeat c section (1999); repeat c section (2005); repeat c section (2008); appendectomy (2011); nicholas by laparoscopy (9/27/2011); wrist exploration (Left, 1980's); colonoscopy (2018); dental extraction(s) (2014); endoscopic us exam, esoph (4/29/2020); gastroscopy-endo (4/29/2020); egd with asp/bx (4/29/2020); upper gi endoscopy,diagnosis (N/A, 9/9/2022); and egd w/endoscopic ultrasound (N/A, 9/9/2022).    SOCIAL HISTORY  Social History     Tobacco Use    Smoking status: Every Day     Packs/day: 1.00     Years: 30.00     Pack years: 30.00     Types: Cigarettes    Smokeless tobacco: Never   Vaping Use    Vaping Use: Never used   Substance Use Topics    Alcohol use: Never    Drug use: Never      Social History     Substance and Sexual Activity   Drug Use Never       FAMILY HISTORY  Family History   Problem Relation Age of Onset    Cancer Mother     Heart Disease Mother     Hypertension Mother     Heart Disease Father      Hypertension Father        CURRENT MEDICATIONS  Reviewed.  See Encounter Summary.     ALLERGIES  Allergies   Allergen Reactions    Penicillins Anaphylaxis and Hives     Tolerates cephalosporins; reports throat swelling with PCN    Abilify Unspecified     Multiple side effects    Aripiprazole Nausea     Spasms, shaking      Nitrous Oxide Vomiting       PHYSICAL EXAM  VITAL SIGNS: BP 96/57   Pulse (!) 102   Temp 36.9 °C (98.5 °F) (Temporal)   Resp 14   Wt 45.4 kg (100 lb)   LMP 09/21/2011   SpO2 98%   BMI 17.71 kg/m²   Constitutional: Awake, alert in no apparent distress.  HENT: Normocephalic, Bilateral external ears normal. Nose normal.  Mucosal membranes.  Neck is supple with full range of motion.  Eyes: Conjunctiva normal, non-icteric, EOMI.    Thorax & Lungs: Easy unlabored respirations, Clear to ascultation bilaterally.  Cardiovascular:  Tachycardic, regular rate, Regular rhythm, No murmurs, rubs or gallops. Bilateral pulses symmetrical.   Abdomen:  Soft, diffusely tender without rebound or guarding, nondistended, normal active bowel sounds.   :    Skin: Visualized skin is  Dry, No erythema, No rash.   Musculoskeletal:   No cyanosis, clubbing or edema. No leg asymmetry.  Patient missing her second through fifth fingers on the right hand.  Signs in the left hand consistent with rheumatoid arthritis.  Neurologic: Alert, Grossly non-focal.  Sensation intact lower extremities, slightly decreased to the feet, no drift bilateral lower extremities, strength 4+.  Psychiatric: Normal affect, Normal mood  Lymphatic:        RADIOLOGY  CT-CTA COMPLETE THORACOABDOMINAL AORTA   Final Result      1.  No evidence for aortic aneurysm or dissection.   2.  No acute cardiopulmonary disease.   3.  Biliary dilation, increased from prior exam concerning for distal stricture, stone or mass.   4.  Prior cholecystectomy.   5.  Bilateral nonobstructing kidney stones.   6.  Mildly enlarged bilateral external iliac and inguinal  lymph nodes, of uncertain etiology.                        EC-ECHOCARDIOGRAM COMPLETE W/O CONT    (Results Pending)         COURSE & MEDICAL DECISION MAKING  Pertinent Labs & Imaging studies reviewed. (See chart for details)    Differential diagnoses include but are not limited to: Most likely this is the patient's return of chronic abdominal pain, though she does have a significant leukocytosis, remainder of labs are still pending.  Other concerns would be sepsis, dehydration, renal insufficiency, less likely perforated viscus.  New neurologic symptoms do raise concern for dissection as well, though this seems less likely.  We will image the aorta given the history provided, as this would be the most immediately life-threatening process.  In the interim we will treat pain with morphine, IV fluids for borderline hypotension/possible sepsis, lactate, blood cultures added to the work-up.    1:03 PM - Medical record reviewed,  asthma, rheumatoid arthritis on Enbrel, chronic pain on oxycodone, history of expiratory laparotomy after duodenal perforation (2019), and prior cholecystectomy.  Patient was admitted for hypotension, abdominal pain in September 2022.  Ultrasound of the abdomen did not show any concerns for SMA, she underwent SBFT that showed no evidence of obstruction, underwent EGD that was unremarkable.  Patient was discharged and had a celiac plexus block done by Dr. Brooke as an outpatient.  Nursing notes reviewed, patient seen and examined at bedside.    4:35 PM: I have did the patient with the test results, I am concerned about a leukocytosis, metabolic acidosis.  She informed me that she had a cyst that spontaneously ruptured on the left thigh.  Patient reexamined with the assistance of a chaperone, she does have what appears to be a spontaneously ruptured abscess in the proximal left thigh, no fluctuance, no surrounding erythema, does not explain the patient's SIRS criteria.  Hospitalist will be paged  for admission.    Decision Making:  This is a pleasant 52 y.o. year old female who presents with abdominal pain.  The patient has had a history of chronic abdominal pain though has been off all opioids for at least a month and her pain has not been present until the past week.  She presents tachycardic, soft blood pressures, diffuse abdominal pain, this is similar to prior presentations.  What is new today is that she has a significant leukocytosis and metabolic acidosis with a bicarb of 7.  Etiology is not entirely clear as the lactate is not significantly elevated, therefore, not concerning for lactic acidosis causing the metabolic acidosis.  Salicylates, alcohol added to the work-up.  Because she had new lower extremity weakness/numbness I did an aortic protocol which fortunately does not show any evidence of aortic dissection, though she does have some ductal dilatation that is more prominent than previous.  This does raise a concern for obstruction though no obstruction has been seen on the CT.  LFTs are relatively unremarkable with only slight ill increase in alkaline phosphatase, T bili normal.    In summary, this is a 52-year-old female who is tachycardic, hypotensive with significant leukocytosis, metabolic acidosis, will be treated for sepsis of unclear etiology, consider inpatient MRCP for possible ascending cholangitis.        FINAL IMPRESSION  1. Metabolic acidosis    2. SIRS (systemic inflammatory response syndrome) (HCC)    3. Leukocytosis, unspecified type    4. Generalized abdominal pain

## 2023-01-19 NOTE — ED NOTES
Pt's main concern is that she's less mobile, has been unable to walk due to weakness and feels like her legs are going to give out.  Pt now vomiting.

## 2023-01-19 NOTE — ED NOTES
Ambulates to the bathroom but did not give a specimen, pt aware we need one next time she has to go.

## 2023-01-20 ENCOUNTER — APPOINTMENT (OUTPATIENT)
Dept: CARDIOLOGY | Facility: MEDICAL CENTER | Age: 53
DRG: 393 | End: 2023-01-20
Attending: HOSPITALIST
Payer: MEDICAID

## 2023-01-20 ENCOUNTER — APPOINTMENT (OUTPATIENT)
Dept: RADIOLOGY | Facility: MEDICAL CENTER | Age: 53
DRG: 393 | End: 2023-01-20
Attending: STUDENT IN AN ORGANIZED HEALTH CARE EDUCATION/TRAINING PROGRAM
Payer: MEDICAID

## 2023-01-20 ENCOUNTER — APPOINTMENT (OUTPATIENT)
Dept: RADIOLOGY | Facility: MEDICAL CENTER | Age: 53
DRG: 393 | End: 2023-01-20
Attending: INTERNAL MEDICINE
Payer: MEDICAID

## 2023-01-20 ENCOUNTER — APPOINTMENT (OUTPATIENT)
Dept: PHYSICAL MEDICINE AND REHAB | Facility: MEDICAL CENTER | Age: 53
End: 2023-01-20
Payer: MEDICAID

## 2023-01-20 LAB
ALBUMIN SERPL BCP-MCNC: 2.7 G/DL (ref 3.2–4.9)
ALBUMIN SERPL BCP-MCNC: 2.7 G/DL (ref 3.2–4.9)
ALBUMIN SERPL BCP-MCNC: 2.9 G/DL (ref 3.2–4.9)
ALBUMIN/GLOB SERPL: 1.1 G/DL
ALBUMIN/GLOB SERPL: 1.1 G/DL
ALP SERPL-CCNC: 96 U/L (ref 30–99)
ALP SERPL-CCNC: 98 U/L (ref 30–99)
ALT SERPL-CCNC: <5 U/L (ref 2–50)
ALT SERPL-CCNC: <5 U/L (ref 2–50)
ANION GAP SERPL CALC-SCNC: 12 MMOL/L (ref 7–16)
ANION GAP SERPL CALC-SCNC: 12 MMOL/L (ref 7–16)
ANION GAP SERPL CALC-SCNC: 15 MMOL/L (ref 7–16)
AST SERPL-CCNC: 5 U/L (ref 12–45)
AST SERPL-CCNC: 6 U/L (ref 12–45)
BASOPHILS # BLD AUTO: 0.3 % (ref 0–1.8)
BASOPHILS # BLD AUTO: 0.5 % (ref 0–1.8)
BASOPHILS # BLD: 0.04 K/UL (ref 0–0.12)
BASOPHILS # BLD: 0.07 K/UL (ref 0–0.12)
BILIRUB SERPL-MCNC: <0.2 MG/DL (ref 0.1–1.5)
BILIRUB SERPL-MCNC: <0.2 MG/DL (ref 0.1–1.5)
BUN SERPL-MCNC: 10 MG/DL (ref 8–22)
BUN SERPL-MCNC: 12 MG/DL (ref 8–22)
BUN SERPL-MCNC: 9 MG/DL (ref 8–22)
CA-I SERPL-SCNC: 1.2 MMOL/L (ref 1.1–1.3)
CALCIUM ALBUM COR SERPL-MCNC: 8.2 MG/DL (ref 8.5–10.5)
CALCIUM ALBUM COR SERPL-MCNC: 8.3 MG/DL (ref 8.5–10.5)
CALCIUM ALBUM COR SERPL-MCNC: 8.6 MG/DL (ref 8.5–10.5)
CALCIUM SERPL-MCNC: 7.2 MG/DL (ref 8.5–10.5)
CALCIUM SERPL-MCNC: 7.3 MG/DL (ref 8.5–10.5)
CALCIUM SERPL-MCNC: 7.7 MG/DL (ref 8.5–10.5)
CHLORIDE SERPL-SCNC: 113 MMOL/L (ref 96–112)
CHLORIDE SERPL-SCNC: 113 MMOL/L (ref 96–112)
CHLORIDE SERPL-SCNC: 115 MMOL/L (ref 96–112)
CO2 SERPL-SCNC: 10 MMOL/L (ref 20–33)
CO2 SERPL-SCNC: 15 MMOL/L (ref 20–33)
CO2 SERPL-SCNC: 9 MMOL/L (ref 20–33)
CORTIS SERPL-MCNC: 31.2 UG/DL (ref 0–23)
CREAT SERPL-MCNC: 0.88 MG/DL (ref 0.5–1.4)
CREAT SERPL-MCNC: 0.95 MG/DL (ref 0.5–1.4)
CREAT SERPL-MCNC: 1.05 MG/DL (ref 0.5–1.4)
EOSINOPHIL # BLD AUTO: 0.02 K/UL (ref 0–0.51)
EOSINOPHIL # BLD AUTO: 0.03 K/UL (ref 0–0.51)
EOSINOPHIL NFR BLD: 0.2 % (ref 0–6.9)
EOSINOPHIL NFR BLD: 0.2 % (ref 0–6.9)
ERYTHROCYTE [DISTWIDTH] IN BLOOD BY AUTOMATED COUNT: 59.6 FL (ref 35.9–50)
ERYTHROCYTE [DISTWIDTH] IN BLOOD BY AUTOMATED COUNT: 59.9 FL (ref 35.9–50)
FERRITIN SERPL-MCNC: 199 NG/ML (ref 10–291)
GFR SERPLBLD CREATININE-BSD FMLA CKD-EPI: 64 ML/MIN/1.73 M 2
GFR SERPLBLD CREATININE-BSD FMLA CKD-EPI: 73 ML/MIN/1.73 M 2
GFR SERPLBLD CREATININE-BSD FMLA CKD-EPI: 80 ML/MIN/1.73 M 2
GLOBULIN SER CALC-MCNC: 2.5 G/DL (ref 1.9–3.5)
GLOBULIN SER CALC-MCNC: 2.5 G/DL (ref 1.9–3.5)
GLUCOSE BLD STRIP.AUTO-MCNC: 88 MG/DL (ref 65–99)
GLUCOSE SERPL-MCNC: 101 MG/DL (ref 65–99)
GLUCOSE SERPL-MCNC: 107 MG/DL (ref 65–99)
GLUCOSE SERPL-MCNC: 117 MG/DL (ref 65–99)
HCT VFR BLD AUTO: 26.4 % (ref 37–47)
HCT VFR BLD AUTO: 27.2 % (ref 37–47)
HCT VFR BLD AUTO: 28.2 % (ref 37–47)
HGB BLD-MCNC: 8.7 G/DL (ref 12–16)
HGB BLD-MCNC: 8.9 G/DL (ref 12–16)
HGB BLD-MCNC: 9 G/DL (ref 12–16)
IMM GRANULOCYTES # BLD AUTO: 0.09 K/UL (ref 0–0.11)
IMM GRANULOCYTES # BLD AUTO: 0.1 K/UL (ref 0–0.11)
IMM GRANULOCYTES NFR BLD AUTO: 0.7 % (ref 0–0.9)
IMM GRANULOCYTES NFR BLD AUTO: 0.7 % (ref 0–0.9)
IRON SATN MFR SERPL: ABNORMAL % (ref 15–55)
IRON SERPL-MCNC: 9 UG/DL (ref 40–170)
LACTATE SERPL-SCNC: 0.8 MMOL/L (ref 0.5–2)
LACTATE SERPL-SCNC: 0.9 MMOL/L (ref 0.5–2)
LACTATE SERPL-SCNC: 1 MMOL/L (ref 0.5–2)
LACTATE SERPL-SCNC: 1.2 MMOL/L (ref 0.5–2)
LV EJECT FRACT  99904: 70
LV EJECT FRACT MOD 2C 99903: 66.26
LV EJECT FRACT MOD 4C 99902: 74.55
LV EJECT FRACT MOD BP 99901: 71.74
LYMPHOCYTES # BLD AUTO: 0.56 K/UL (ref 1–4.8)
LYMPHOCYTES # BLD AUTO: 0.98 K/UL (ref 1–4.8)
LYMPHOCYTES NFR BLD: 4.3 % (ref 22–41)
LYMPHOCYTES NFR BLD: 7 % (ref 22–41)
MAGNESIUM SERPL-MCNC: 1.9 MG/DL (ref 1.5–2.5)
MCH RBC QN AUTO: 32.1 PG (ref 27–33)
MCH RBC QN AUTO: 32.5 PG (ref 27–33)
MCHC RBC AUTO-ENTMCNC: 31.9 G/DL (ref 33.6–35)
MCHC RBC AUTO-ENTMCNC: 32 G/DL (ref 33.6–35)
MCV RBC AUTO: 100.4 FL (ref 81.4–97.8)
MCV RBC AUTO: 101.8 FL (ref 81.4–97.8)
MONOCYTES # BLD AUTO: 0.62 K/UL (ref 0–0.85)
MONOCYTES # BLD AUTO: 0.75 K/UL (ref 0–0.85)
MONOCYTES NFR BLD AUTO: 4.7 % (ref 0–13.4)
MONOCYTES NFR BLD AUTO: 5.3 % (ref 0–13.4)
NEUTROPHILS # BLD AUTO: 11.78 K/UL (ref 2–7.15)
NEUTROPHILS # BLD AUTO: 12.13 K/UL (ref 2–7.15)
NEUTROPHILS NFR BLD: 86.3 % (ref 44–72)
NEUTROPHILS NFR BLD: 89.8 % (ref 44–72)
NRBC # BLD AUTO: 0 K/UL
NRBC # BLD AUTO: 0 K/UL
NRBC BLD-RTO: 0 /100 WBC
NRBC BLD-RTO: 0 /100 WBC
PHOSPHATE SERPL-MCNC: 2.7 MG/DL (ref 2.5–4.5)
PHOSPHATE SERPL-MCNC: 3.3 MG/DL (ref 2.5–4.5)
PLATELET # BLD AUTO: 249 K/UL (ref 164–446)
PLATELET # BLD AUTO: 249 K/UL (ref 164–446)
PMV BLD AUTO: 10.6 FL (ref 9–12.9)
PMV BLD AUTO: 10.8 FL (ref 9–12.9)
POTASSIUM SERPL-SCNC: 2.9 MMOL/L (ref 3.6–5.5)
POTASSIUM SERPL-SCNC: 3.1 MMOL/L (ref 3.6–5.5)
POTASSIUM SERPL-SCNC: 3.4 MMOL/L (ref 3.6–5.5)
PREALB SERPL-MCNC: 13.1 MG/DL (ref 18–38)
PROT SERPL-MCNC: 5.2 G/DL (ref 6–8.2)
PROT SERPL-MCNC: 5.2 G/DL (ref 6–8.2)
PTH-INTACT SERPL-MCNC: 101 PG/ML (ref 14–72)
RBC # BLD AUTO: 2.71 M/UL (ref 4.2–5.4)
RBC # BLD AUTO: 2.77 M/UL (ref 4.2–5.4)
SODIUM SERPL-SCNC: 137 MMOL/L (ref 135–145)
SODIUM SERPL-SCNC: 137 MMOL/L (ref 135–145)
SODIUM SERPL-SCNC: 140 MMOL/L (ref 135–145)
TIBC SERPL-MCNC: 181 UG/DL (ref 250–450)
TROPONIN T SERPL-MCNC: 17 NG/L (ref 6–19)
TSH SERPL DL<=0.005 MIU/L-ACNC: 0.73 UIU/ML (ref 0.38–5.33)
UIBC SERPL-MCNC: 172 UG/DL (ref 110–370)
VIT B12 SERPL-MCNC: 259 PG/ML (ref 211–911)
WBC # BLD AUTO: 13.1 K/UL (ref 4.8–10.8)
WBC # BLD AUTO: 14.1 K/UL (ref 4.8–10.8)

## 2023-01-20 PROCEDURE — 99233 SBSQ HOSP IP/OBS HIGH 50: CPT | Performed by: INTERNAL MEDICINE

## 2023-01-20 PROCEDURE — 700105 HCHG RX REV CODE 258

## 2023-01-20 PROCEDURE — 700102 HCHG RX REV CODE 250 W/ 637 OVERRIDE(OP): Performed by: STUDENT IN AN ORGANIZED HEALTH CARE EDUCATION/TRAINING PROGRAM

## 2023-01-20 PROCEDURE — 700111 HCHG RX REV CODE 636 W/ 250 OVERRIDE (IP)

## 2023-01-20 PROCEDURE — A9270 NON-COVERED ITEM OR SERVICE: HCPCS | Performed by: INTERNAL MEDICINE

## 2023-01-20 PROCEDURE — 80069 RENAL FUNCTION PANEL: CPT

## 2023-01-20 PROCEDURE — 700102 HCHG RX REV CODE 250 W/ 637 OVERRIDE(OP): Performed by: INTERNAL MEDICINE

## 2023-01-20 PROCEDURE — 700111 HCHG RX REV CODE 636 W/ 250 OVERRIDE (IP): Performed by: INTERNAL MEDICINE

## 2023-01-20 PROCEDURE — 84100 ASSAY OF PHOSPHORUS: CPT

## 2023-01-20 PROCEDURE — 700105 HCHG RX REV CODE 258: Performed by: INTERNAL MEDICINE

## 2023-01-20 PROCEDURE — 700101 HCHG RX REV CODE 250: Performed by: INTERNAL MEDICINE

## 2023-01-20 PROCEDURE — 85018 HEMOGLOBIN: CPT

## 2023-01-20 PROCEDURE — 80053 COMPREHEN METABOLIC PANEL: CPT

## 2023-01-20 PROCEDURE — 93005 ELECTROCARDIOGRAM TRACING: CPT | Performed by: STUDENT IN AN ORGANIZED HEALTH CARE EDUCATION/TRAINING PROGRAM

## 2023-01-20 PROCEDURE — 83605 ASSAY OF LACTIC ACID: CPT | Mod: 91

## 2023-01-20 PROCEDURE — C1751 CATH, INF, PER/CENT/MIDLINE: HCPCS

## 2023-01-20 PROCEDURE — 700102 HCHG RX REV CODE 250 W/ 637 OVERRIDE(OP): Performed by: HOSPITALIST

## 2023-01-20 PROCEDURE — 700105 HCHG RX REV CODE 258: Performed by: STUDENT IN AN ORGANIZED HEALTH CARE EDUCATION/TRAINING PROGRAM

## 2023-01-20 PROCEDURE — 02HV33Z INSERTION OF INFUSION DEVICE INTO SUPERIOR VENA CAVA, PERCUTANEOUS APPROACH: ICD-10-PCS | Performed by: INTERNAL MEDICINE

## 2023-01-20 PROCEDURE — 700101 HCHG RX REV CODE 250: Performed by: HOSPITALIST

## 2023-01-20 PROCEDURE — 85014 HEMATOCRIT: CPT

## 2023-01-20 PROCEDURE — 93306 TTE W/DOPPLER COMPLETE: CPT

## 2023-01-20 PROCEDURE — 700111 HCHG RX REV CODE 636 W/ 250 OVERRIDE (IP): Performed by: HOSPITALIST

## 2023-01-20 PROCEDURE — A9270 NON-COVERED ITEM OR SERVICE: HCPCS | Performed by: HOSPITALIST

## 2023-01-20 PROCEDURE — 93306 TTE W/DOPPLER COMPLETE: CPT | Mod: 26 | Performed by: INTERNAL MEDICINE

## 2023-01-20 PROCEDURE — 700111 HCHG RX REV CODE 636 W/ 250 OVERRIDE (IP): Performed by: STUDENT IN AN ORGANIZED HEALTH CARE EDUCATION/TRAINING PROGRAM

## 2023-01-20 PROCEDURE — 83735 ASSAY OF MAGNESIUM: CPT

## 2023-01-20 PROCEDURE — 99253 IP/OBS CNSLTJ NEW/EST LOW 45: CPT | Performed by: INTERNAL MEDICINE

## 2023-01-20 PROCEDURE — 770000 HCHG ROOM/CARE - INTERMEDIATE ICU *

## 2023-01-20 PROCEDURE — A9270 NON-COVERED ITEM OR SERVICE: HCPCS | Performed by: STUDENT IN AN ORGANIZED HEALTH CARE EDUCATION/TRAINING PROGRAM

## 2023-01-20 PROCEDURE — 84134 ASSAY OF PREALBUMIN: CPT

## 2023-01-20 PROCEDURE — 82962 GLUCOSE BLOOD TEST: CPT

## 2023-01-20 PROCEDURE — 84484 ASSAY OF TROPONIN QUANT: CPT

## 2023-01-20 PROCEDURE — 85025 COMPLETE CBC W/AUTO DIFF WBC: CPT

## 2023-01-20 RX ORDER — QUETIAPINE FUMARATE 25 MG/1
25 TABLET, FILM COATED ORAL 2 TIMES DAILY PRN
Status: DISCONTINUED | OUTPATIENT
Start: 2023-01-20 | End: 2023-01-21

## 2023-01-20 RX ORDER — SODIUM BICARBONATE 650 MG/1
1950 TABLET ORAL 3 TIMES DAILY
Status: DISCONTINUED | OUTPATIENT
Start: 2023-01-20 | End: 2023-01-20

## 2023-01-20 RX ORDER — MIDODRINE HYDROCHLORIDE 5 MG/1
10 TABLET ORAL
Status: DISCONTINUED | OUTPATIENT
Start: 2023-01-20 | End: 2023-01-20

## 2023-01-20 RX ORDER — QUETIAPINE FUMARATE 100 MG/1
100 TABLET, FILM COATED ORAL EVERY EVENING
Status: DISCONTINUED | OUTPATIENT
Start: 2023-01-20 | End: 2023-01-20

## 2023-01-20 RX ORDER — LANOLIN ALCOHOL/MO/W.PET/CERES
400 CREAM (GRAM) TOPICAL DAILY
Status: DISCONTINUED | OUTPATIENT
Start: 2023-01-20 | End: 2023-01-20

## 2023-01-20 RX ORDER — CALCIUM CARBONATE 500 MG/1
500 TABLET, CHEWABLE ORAL 3 TIMES DAILY
Status: DISCONTINUED | OUTPATIENT
Start: 2023-01-20 | End: 2023-01-20

## 2023-01-20 RX ORDER — MORPHINE SULFATE 4 MG/ML
2 INJECTION INTRAVENOUS EVERY 6 HOURS PRN
Status: DISCONTINUED | OUTPATIENT
Start: 2023-01-21 | End: 2023-01-21

## 2023-01-20 RX ORDER — NOREPINEPHRINE BITARTRATE 0.03 MG/ML
0-1 INJECTION, SOLUTION INTRAVENOUS CONTINUOUS
Status: DISCONTINUED | OUTPATIENT
Start: 2023-01-20 | End: 2023-01-22

## 2023-01-20 RX ORDER — OXYCODONE HYDROCHLORIDE 5 MG/1
5 TABLET ORAL
Status: DISCONTINUED | OUTPATIENT
Start: 2023-01-21 | End: 2023-01-21

## 2023-01-20 RX ORDER — CALCIUM CARBONATE 500 MG/1
500 TABLET, CHEWABLE ORAL 3 TIMES DAILY
Status: DISCONTINUED | OUTPATIENT
Start: 2023-01-20 | End: 2023-01-21

## 2023-01-20 RX ORDER — POTASSIUM CHLORIDE 20 MEQ/1
40 TABLET, EXTENDED RELEASE ORAL ONCE
Status: COMPLETED | OUTPATIENT
Start: 2023-01-20 | End: 2023-01-20

## 2023-01-20 RX ORDER — SODIUM CHLORIDE, SODIUM LACTATE, POTASSIUM CHLORIDE, AND CALCIUM CHLORIDE .6; .31; .03; .02 G/100ML; G/100ML; G/100ML; G/100ML
INJECTION, SOLUTION INTRAVENOUS
Status: COMPLETED | OUTPATIENT
Start: 2023-01-20 | End: 2023-01-20

## 2023-01-20 RX ORDER — CHOLECALCIFEROL (VITAMIN D3) 125 MCG
1000 CAPSULE ORAL 2 TIMES DAILY
Status: DISCONTINUED | OUTPATIENT
Start: 2023-01-20 | End: 2023-01-20

## 2023-01-20 RX ORDER — QUETIAPINE FUMARATE 25 MG/1
25 TABLET, FILM COATED ORAL 2 TIMES DAILY PRN
Status: DISCONTINUED | OUTPATIENT
Start: 2023-01-20 | End: 2023-01-20

## 2023-01-20 RX ORDER — ONDANSETRON 4 MG/1
4 TABLET, ORALLY DISINTEGRATING ORAL EVERY 4 HOURS PRN
Status: DISCONTINUED | OUTPATIENT
Start: 2023-01-20 | End: 2023-01-21

## 2023-01-20 RX ORDER — PAROXETINE HYDROCHLORIDE 20 MG/1
30 TABLET, FILM COATED ORAL EVERY EVENING
Status: DISCONTINUED | OUTPATIENT
Start: 2023-01-20 | End: 2023-01-21

## 2023-01-20 RX ORDER — BISACODYL 10 MG
10 SUPPOSITORY, RECTAL RECTAL
Status: DISCONTINUED | OUTPATIENT
Start: 2023-01-20 | End: 2023-01-21

## 2023-01-20 RX ORDER — OXYCODONE HYDROCHLORIDE 10 MG/1
10 TABLET ORAL
Status: DISCONTINUED | OUTPATIENT
Start: 2023-01-20 | End: 2023-01-20

## 2023-01-20 RX ORDER — OXYCODONE HYDROCHLORIDE 5 MG/1
5 TABLET ORAL
Status: DISCONTINUED | OUTPATIENT
Start: 2023-01-21 | End: 2023-01-20

## 2023-01-20 RX ORDER — MIDODRINE HYDROCHLORIDE 5 MG/1
10 TABLET ORAL ONCE
Status: COMPLETED | OUTPATIENT
Start: 2023-01-20 | End: 2023-01-20

## 2023-01-20 RX ORDER — AMOXICILLIN 250 MG
2 CAPSULE ORAL 2 TIMES DAILY
Status: DISCONTINUED | OUTPATIENT
Start: 2023-01-20 | End: 2023-01-20

## 2023-01-20 RX ORDER — MORPHINE SULFATE 4 MG/ML
2 INJECTION INTRAVENOUS
Status: DISCONTINUED | OUTPATIENT
Start: 2023-01-20 | End: 2023-01-20

## 2023-01-20 RX ORDER — METRONIDAZOLE 500 MG/1
500 TABLET ORAL EVERY 8 HOURS
Status: DISCONTINUED | OUTPATIENT
Start: 2023-01-20 | End: 2023-01-21

## 2023-01-20 RX ORDER — BISACODYL 10 MG
10 SUPPOSITORY, RECTAL RECTAL
Status: DISCONTINUED | OUTPATIENT
Start: 2023-01-20 | End: 2023-01-20

## 2023-01-20 RX ORDER — QUETIAPINE FUMARATE 100 MG/1
100 TABLET, FILM COATED ORAL EVERY EVENING
Status: DISCONTINUED | OUTPATIENT
Start: 2023-01-20 | End: 2023-01-21

## 2023-01-20 RX ORDER — SODIUM BICARBONATE 650 MG/1
1950 TABLET ORAL 3 TIMES DAILY
Status: DISCONTINUED | OUTPATIENT
Start: 2023-01-20 | End: 2023-01-21

## 2023-01-20 RX ORDER — AMOXICILLIN 250 MG
2 CAPSULE ORAL 2 TIMES DAILY
Status: DISCONTINUED | OUTPATIENT
Start: 2023-01-20 | End: 2023-01-21

## 2023-01-20 RX ORDER — MORPHINE SULFATE 4 MG/ML
2 INJECTION INTRAVENOUS
Status: DISPENSED | OUTPATIENT
Start: 2023-01-20 | End: 2023-01-20

## 2023-01-20 RX ORDER — POTASSIUM CHLORIDE 20 MEQ/1
40 TABLET, EXTENDED RELEASE ORAL 3 TIMES DAILY
Status: DISCONTINUED | OUTPATIENT
Start: 2023-01-20 | End: 2023-01-20

## 2023-01-20 RX ORDER — CHOLECALCIFEROL (VITAMIN D3) 125 MCG
1000 CAPSULE ORAL 2 TIMES DAILY
Status: DISCONTINUED | OUTPATIENT
Start: 2023-01-20 | End: 2023-01-21

## 2023-01-20 RX ORDER — POLYETHYLENE GLYCOL 3350 17 G/17G
1 POWDER, FOR SOLUTION ORAL
Status: DISCONTINUED | OUTPATIENT
Start: 2023-01-20 | End: 2023-01-20

## 2023-01-20 RX ORDER — FOLIC ACID 1 MG/1
1 TABLET ORAL 2 TIMES DAILY
Status: DISCONTINUED | OUTPATIENT
Start: 2023-01-20 | End: 2023-01-20

## 2023-01-20 RX ORDER — MIDODRINE HYDROCHLORIDE 5 MG/1
10 TABLET ORAL
Status: DISCONTINUED | OUTPATIENT
Start: 2023-01-20 | End: 2023-01-21

## 2023-01-20 RX ORDER — POTASSIUM CHLORIDE 20 MEQ/1
40 TABLET, EXTENDED RELEASE ORAL EVERY 4 HOURS
Status: DISCONTINUED | OUTPATIENT
Start: 2023-01-20 | End: 2023-01-20

## 2023-01-20 RX ORDER — LANOLIN ALCOHOL/MO/W.PET/CERES
400 CREAM (GRAM) TOPICAL DAILY
Status: DISCONTINUED | OUTPATIENT
Start: 2023-01-21 | End: 2023-01-21

## 2023-01-20 RX ORDER — ONDANSETRON 4 MG/1
4 TABLET, ORALLY DISINTEGRATING ORAL EVERY 4 HOURS PRN
Status: DISCONTINUED | OUTPATIENT
Start: 2023-01-20 | End: 2023-01-20

## 2023-01-20 RX ORDER — PAROXETINE HYDROCHLORIDE 20 MG/1
30 TABLET, FILM COATED ORAL EVERY EVENING
Status: DISCONTINUED | OUTPATIENT
Start: 2023-01-20 | End: 2023-01-20

## 2023-01-20 RX ORDER — OXYCODONE HYDROCHLORIDE 5 MG/1
5 TABLET ORAL
Status: DISCONTINUED | OUTPATIENT
Start: 2023-01-20 | End: 2023-01-20

## 2023-01-20 RX ORDER — MORPHINE SULFATE 4 MG/ML
2 INJECTION INTRAVENOUS EVERY 6 HOURS PRN
Status: DISCONTINUED | OUTPATIENT
Start: 2023-01-21 | End: 2023-01-20

## 2023-01-20 RX ORDER — OXYCODONE HYDROCHLORIDE 5 MG/1
5 TABLET ORAL EVERY 4 HOURS PRN
Status: DISCONTINUED | OUTPATIENT
Start: 2023-01-21 | End: 2023-01-20

## 2023-01-20 RX ORDER — POLYETHYLENE GLYCOL 3350 17 G/17G
1 POWDER, FOR SOLUTION ORAL
Status: DISCONTINUED | OUTPATIENT
Start: 2023-01-20 | End: 2023-01-21

## 2023-01-20 RX ORDER — SODIUM BICARBONATE IN D5W 150/1000ML
PLASTIC BAG, INJECTION (ML) INTRAVENOUS CONTINUOUS
Status: DISCONTINUED | OUTPATIENT
Start: 2023-01-20 | End: 2023-01-21

## 2023-01-20 RX ORDER — ACETAMINOPHEN 325 MG/1
650 TABLET ORAL EVERY 6 HOURS PRN
Status: DISCONTINUED | OUTPATIENT
Start: 2023-01-20 | End: 2023-01-20

## 2023-01-20 RX ORDER — OXYCODONE HYDROCHLORIDE 10 MG/1
10 TABLET ORAL
Status: ACTIVE | OUTPATIENT
Start: 2023-01-20 | End: 2023-01-20

## 2023-01-20 RX ORDER — FOLIC ACID 1 MG/1
1 TABLET ORAL 2 TIMES DAILY
Status: DISCONTINUED | OUTPATIENT
Start: 2023-01-20 | End: 2023-01-21

## 2023-01-20 RX ORDER — OXYCODONE HYDROCHLORIDE 5 MG/1
5 TABLET ORAL
Status: ACTIVE | OUTPATIENT
Start: 2023-01-20 | End: 2023-01-20

## 2023-01-20 RX ORDER — ACETAMINOPHEN 325 MG/1
650 TABLET ORAL EVERY 6 HOURS PRN
Status: DISCONTINUED | OUTPATIENT
Start: 2023-01-20 | End: 2023-01-21

## 2023-01-20 RX ORDER — SODIUM CHLORIDE, SODIUM LACTATE, POTASSIUM CHLORIDE, AND CALCIUM CHLORIDE .6; .31; .03; .02 G/100ML; G/100ML; G/100ML; G/100ML
500 INJECTION, SOLUTION INTRAVENOUS ONCE
Status: COMPLETED | OUTPATIENT
Start: 2023-01-20 | End: 2023-01-20

## 2023-01-20 RX ORDER — PROMETHAZINE HYDROCHLORIDE 25 MG/1
12.5-25 TABLET ORAL EVERY 4 HOURS PRN
Status: DISCONTINUED | OUTPATIENT
Start: 2023-01-20 | End: 2023-01-20

## 2023-01-20 RX ORDER — LANOLIN ALCOHOL/MO/W.PET/CERES
400 CREAM (GRAM) TOPICAL DAILY
Status: DISCONTINUED | OUTPATIENT
Start: 2023-01-21 | End: 2023-01-20

## 2023-01-20 RX ORDER — POTASSIUM CHLORIDE 7.45 MG/ML
10 INJECTION INTRAVENOUS
Status: DISPENSED | OUTPATIENT
Start: 2023-01-20 | End: 2023-01-20

## 2023-01-20 RX ORDER — POTASSIUM CHLORIDE 20 MEQ/1
40 TABLET, EXTENDED RELEASE ORAL 3 TIMES DAILY
Status: DISCONTINUED | OUTPATIENT
Start: 2023-01-20 | End: 2023-01-21

## 2023-01-20 RX ORDER — PROMETHAZINE HYDROCHLORIDE 25 MG/1
12.5-25 TABLET ORAL EVERY 4 HOURS PRN
Status: DISCONTINUED | OUTPATIENT
Start: 2023-01-20 | End: 2023-01-21

## 2023-01-20 RX ADMIN — SODIUM BICARBONATE: 84 INJECTION, SOLUTION INTRAVENOUS at 04:38

## 2023-01-20 RX ADMIN — MIDODRINE HYDROCHLORIDE 10 MG: 5 TABLET ORAL at 14:40

## 2023-01-20 RX ADMIN — OXYCODONE HYDROCHLORIDE 10 MG: 10 TABLET ORAL at 05:41

## 2023-01-20 RX ADMIN — CEFTRIAXONE SODIUM 1000 MG: 10 INJECTION, POWDER, FOR SOLUTION INTRAVENOUS at 20:23

## 2023-01-20 RX ADMIN — POTASSIUM CHLORIDE 10 MEQ: 7.46 INJECTION, SOLUTION INTRAVENOUS at 00:56

## 2023-01-20 RX ADMIN — METRONIDAZOLE 500 MG: 500 TABLET ORAL at 16:35

## 2023-01-20 RX ADMIN — THIAMINE HYDROCHLORIDE 500 MG: 100 INJECTION, SOLUTION INTRAMUSCULAR; INTRAVENOUS at 20:28

## 2023-01-20 RX ADMIN — Medication 400 MG: at 06:39

## 2023-01-20 RX ADMIN — SODIUM CHLORIDE, POTASSIUM CHLORIDE, SODIUM LACTATE AND CALCIUM CHLORIDE 500 ML: 600; 310; 30; 20 INJECTION, SOLUTION INTRAVENOUS at 02:49

## 2023-01-20 RX ADMIN — ONDANSETRON 4 MG: 2 INJECTION INTRAMUSCULAR; INTRAVENOUS at 18:19

## 2023-01-20 RX ADMIN — SODIUM CHLORIDE, POTASSIUM CHLORIDE, SODIUM LACTATE AND CALCIUM CHLORIDE 500 ML: 600; 310; 30; 20 INJECTION, SOLUTION INTRAVENOUS at 02:43

## 2023-01-20 RX ADMIN — MORPHINE SULFATE 2 MG: 4 INJECTION, SOLUTION INTRAMUSCULAR; INTRAVENOUS at 16:39

## 2023-01-20 RX ADMIN — POTASSIUM CHLORIDE 10 MEQ: 7.46 INJECTION, SOLUTION INTRAVENOUS at 02:51

## 2023-01-20 RX ADMIN — OMEPRAZOLE 40 MG: 20 CAPSULE, DELAYED RELEASE ORAL at 05:21

## 2023-01-20 RX ADMIN — CYANOCOBALAMIN TAB 500 MCG 1000 MCG: 500 TAB at 05:20

## 2023-01-20 RX ADMIN — THIAMINE HYDROCHLORIDE 500 MG: 100 INJECTION, SOLUTION INTRAMUSCULAR; INTRAVENOUS at 05:30

## 2023-01-20 RX ADMIN — MIDODRINE HYDROCHLORIDE 10 MG: 5 TABLET ORAL at 07:51

## 2023-01-20 RX ADMIN — MORPHINE SULFATE 2 MG: 4 INJECTION INTRAVENOUS at 10:16

## 2023-01-20 RX ADMIN — CALCIUM CARBONATE 500 MG: 500 TABLET, CHEWABLE ORAL at 05:21

## 2023-01-20 RX ADMIN — PAROXETINE HYDROCHLORIDE 30 MG: 20 TABLET, FILM COATED ORAL at 18:18

## 2023-01-20 RX ADMIN — METRONIDAZOLE 500 MG: 5 INJECTION, SOLUTION INTRAVENOUS at 07:12

## 2023-01-20 RX ADMIN — POTASSIUM CHLORIDE 10 MEQ: 7.46 INJECTION, SOLUTION INTRAVENOUS at 04:55

## 2023-01-20 RX ADMIN — OXYCODONE HYDROCHLORIDE 10 MG: 10 TABLET ORAL at 14:40

## 2023-01-20 RX ADMIN — POTASSIUM CHLORIDE 40 MEQ: 1500 TABLET, EXTENDED RELEASE ORAL at 14:39

## 2023-01-20 RX ADMIN — POTASSIUM CHLORIDE 40 MEQ: 1500 TABLET, EXTENDED RELEASE ORAL at 18:18

## 2023-01-20 RX ADMIN — MIDODRINE HYDROCHLORIDE 10 MG: 5 TABLET ORAL at 16:35

## 2023-01-20 RX ADMIN — FOLIC ACID 1 MG: 1 TABLET ORAL at 05:21

## 2023-01-20 RX ADMIN — QUETIAPINE FUMARATE 100 MG: 100 TABLET ORAL at 18:18

## 2023-01-20 RX ADMIN — METRONIDAZOLE 500 MG: 500 TABLET ORAL at 23:11

## 2023-01-20 RX ADMIN — SODIUM BICARBONATE 100 MEQ: 84 INJECTION, SOLUTION INTRAVENOUS at 03:49

## 2023-01-20 RX ADMIN — THIAMINE HYDROCHLORIDE 500 MG: 100 INJECTION, SOLUTION INTRAMUSCULAR; INTRAVENOUS at 14:48

## 2023-01-20 RX ADMIN — SODIUM BICARBONATE 1950 MG: 650 TABLET ORAL at 18:17

## 2023-01-20 RX ADMIN — FOLIC ACID 1 MG: 1 TABLET ORAL at 18:18

## 2023-01-20 RX ADMIN — POTASSIUM CHLORIDE 40 MEQ: 1500 TABLET, EXTENDED RELEASE ORAL at 08:42

## 2023-01-20 RX ADMIN — CYANOCOBALAMIN TAB 500 MCG 1000 MCG: 500 TAB at 18:18

## 2023-01-20 RX ADMIN — NOREPINEPHRINE BITARTRATE 0.1 MCG/KG/MIN: 1 INJECTION INTRAVENOUS at 03:18

## 2023-01-20 ASSESSMENT — PAIN DESCRIPTION - PAIN TYPE
TYPE: ACUTE PAIN
TYPE: ACUTE PAIN;CHRONIC PAIN
TYPE: ACUTE PAIN
TYPE: CHRONIC PAIN;ACUTE PAIN
TYPE: ACUTE PAIN

## 2023-01-20 ASSESSMENT — ENCOUNTER SYMPTOMS
SENSORY CHANGE: 0
TREMORS: 0
EYE PAIN: 0
PALPITATIONS: 0
COUGH: 0
TINGLING: 0
SPUTUM PRODUCTION: 0
HEADACHES: 0
PHOTOPHOBIA: 0
MYALGIAS: 1
ABDOMINAL PAIN: 1
DIZZINESS: 0
BLURRED VISION: 0
WEIGHT LOSS: 0
FOCAL WEAKNESS: 0
HALLUCINATIONS: 0
CHILLS: 0
NAUSEA: 1
SPEECH CHANGE: 0
VOMITING: 0
DIARRHEA: 0
NECK PAIN: 0
CONSTIPATION: 0
FEVER: 0
SHORTNESS OF BREATH: 0
DOUBLE VISION: 0
ORTHOPNEA: 0
BACK PAIN: 0
WEAKNESS: 1

## 2023-01-20 ASSESSMENT — LIFESTYLE VARIABLES: SUBSTANCE_ABUSE: 0

## 2023-01-20 ASSESSMENT — FIBROSIS 4 INDEX: FIB4 SCORE: 0.47

## 2023-01-20 NOTE — PROGRESS NOTES
NOC HOSPITALIST CROSS COVER    At the start of the shift was called to the patient's room for concern of hypotension.  Orders placed for additional IV fluids and several diagnostics including lactic acid and repeat BMP.  Patient did temporarily improve with IV fluids with blood pressures increasing from 70 systolic to 90 systolic and maps greater than 65.  RN advised of MAP goal of greater than 60.  Blood work did show decreased lactic acid from 1.3 to 1.0 and minor interval increase in CO2 from 7 to 9.  Patient did maintain blood pressures in the 90s for approximately 1 hour until 0153.    0153: RAPID RESPONSE called for hypotension.  Patient's map was below 60.  Contacted critical care physician Dr. Carpenter who agreed to transfer patient to IM for low-dose Levophed.        Plan:  #Hypotension  -IMCU transfer for Levophed    -----------------------------------------------------------------------------------------------------------    Electronically signed by:  HEAVEN Barclay  Hospitalist Services

## 2023-01-20 NOTE — PROGRESS NOTES
4 Eyes Skin Assessment Completed by LEANNE Miranda and LEANNE Zhao.    Head WDL  Ears WDL  Nose WDL  Mouth WDL  Neck WDL  Breast/Chest WDL  Shoulder Blades WDL  Spine WDL  (R) Arm/Elbow/Hand Edema missing fingers, decreased mobility, pain   (L) Arm/Elbow/Hand Edema missing fingers, decreased mobility, pain  Abdomen Scar  Groin -NAIN pt refusing to remove pants  Scrotum/Coccyx/Buttocks NAIN pt refusing to remove pants  (R) Leg NAIN pt refusing to remove pants  (L) Leg NAIN pt refusing to remove pants  (R) Heel/Foot/Toe- dryness, flaky  (L) Heel/Foot/Toe dryness, flaky          Devices In Places ECG, Blood Pressure Cuff, and Pulse Ox      Interventions In Place Pillows and Pressure Redistribution Mattress    Possible Skin Injury No    Pictures Uploaded Into Epic N/A  Wound Consult Placed N/A  RN Wound Prevention Protocol Ordered Yes

## 2023-01-20 NOTE — ED NOTES
Med Rec complete per patient  No oral antibiotics in the last 30 days per patient  Allergies reviewed  Preferred Pharmacy: [facility] on [address]  Patient reports she has been taking 7 to 8 tablets of Ibuprofen, four to fives times daily.   Patient lost insurance in November of 22 and has not been able to get refills on her medications since then

## 2023-01-20 NOTE — DIETARY
"Nutrition Services: Day 1 of admit.  Mary Martinez is a 52 y.o. female with admitting DX of Starvation ketoacidosis.    Consult received for FAILURE TO THRIVE.  BMI <19 per weight on admit.   Additional Dxs include Protein-calorie malnutrition, severe (HCC), SMAS (superior mesenteric artery syndrome) (HCC), Cachexia (HCC), and Hypokalemia.    Mary Martinez is a 52 y.o. female who presents to the Emergency Department for severe cramping upper abdominal pain, also some less severe lower back pain and posterior neck pain, all beginning around the same time 1 week ago.  The patient's abdominal pain feels very similar to the chronic abdominal pain she had previously.  She did very well with a celiac plexus block, she lost her insurance shortly thereafter and has been off all opioid medications for the past 60 days.  Her pain has been very minimal despite being off all pain medications until this began last week.  She denies any fevers, chills.  She has had some nausea, vomiting and diarrhea, but this began yesterday.    Pertinent Hospital Hx:  8/27-8/29/2022: RD assessed on 8/28 and Dx'd severe malnutrition. Weight 47.4 kg 8/27. Pt dislikes Boost. Discussion of feeding tube (pt request), but deemed unnecessary since she tolerated some PO diet during 2-day stay.  Returned to [facility] 8/31-9/2/2022: Was sent by GI office due to hypotension. C/o inability to eat and cont' abd pain. Weight 43.4-45.9 kg (8/31).  9/2/2022: US Doppler mesenteric vessels normal.  UGI SBFT normal.  Tolerated Lao toast and rice and cookies.  Still with pain but stable.  Wants to go home.  BPs remain low but baseline in 90s systolic and currently no dizziness or other symptoms of dehydration  Back @  as OP 9/7-9/9/2022: EGD. Weight 46-47.3 kg 9/7-9/9.  Had CELIAC PLEXUS BLOCK in Sept or Oct 2022.    Assessment:  Height: 160 cm (5' 2.99\")  Weight: 49 kg (108 lb 0.4 oz)  Body mass index is 19.14 kg/m²., BMI classification: " "Normal  Weight range since admission: 45.36-49 kg (1/19-1/20). BMI range 17.71-19.14.    Diet/Intake: Regular diet in place. 0% of breakfast per ADLs. Boost supplements ordered, but pt does not like these and will not consume.     Estimated needs:   30-35 kcal/kg = 0710-1279 kcal/day  1.5-2.0 grams protein/kg = 74-98 grams protein/day    Evaluation:   K+ 3.4, mag/phos WNL this am. Pre-alb 13.1, but no CRP. +IV thiamine  K+ dropped to 2.9 @ 1100.  Pt disliked Boost on prior admit, so RD changed supplements to other high-protein nourishments (2 per meal TID).   GI consult today 1/20: \"Recs: Agree with the [place] feeding tube for nutrition support. Taper opioid. Fasting improves the diarrhea, which make osmotic diarrhea more likely, if recurs, consider stool osmo test. Celiac panel. ESR could be from RA, consider stool calprotectin for GI inflammation. MRCP to exclude pancreatic head lesion. If all tests are not diagnostic, we would recommend [facility] (her old GI team) follow up as outpatient. If MRCP is positive, please call us back. GI signs off on 1/20.\"  Later received consult for FELICIA Cunningham MD, who said pt wants to see how she does with PO diet before trying Cortrak placement.  Visited pt this afternoon.  Pt had eaten a Greek yogurt and ~25% of a burger for lunch.  Pt does NOT want NGT/NJT, only receptive to G- or J-tube.  Discussed pt's typical diet, obtained specific preferences, and made modifications to upcoming meals. RD provided suggestions for enhancing nutrient-density of pt's typical diet. Provide menu.     Weight has been stable for ~5 months.    Malnutrition Risk: Pt continues to meet criteria for severe malnutrition in the setting of limited PO intake secondary to chronic abdominal pain. Despite weight stability, pt presents with severe fat loss and severe muscle wasting.     Recommendations/Plan:  Regular diet with nutrient-dense enhancements/nourishments.  Encourage PO intake as tolerated and document intake % " in ADLs.  Monitor weight and labs.   Nutrition Rep will continue to see pt for ongoing meal and snack preferences.    RD will monitor.
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
## 2023-01-20 NOTE — PROGRESS NOTES
Assumed care of patient, bedside report received from Aminata day shift RN @ 1900 Updated on plan of care, call light within reach and fall precautions are in place and bed lock and in lowest position. Patient instructed to call for assistance before getting out of bed. All questions answered at this time.     Patient is asking for pain medication. BP 77/46    Spoke with Provider Florence Painter about BP. Was given the okay to give oxy 10 for pain.     1000 ml bolus orderd

## 2023-01-20 NOTE — ASSESSMENT & PLAN NOTE
Related to her underlying SMA syndrome which causes abdominal pain after eating food  cont thiamine  Monitor for refeeding syndrome-monitor potassium and phosphorus closely  Dietary consult

## 2023-01-20 NOTE — CARE PLAN
Problem: Pain - Standard  Goal: Alleviation of pain or a reduction in pain to the patient’s comfort goal  Outcome: Not Progressing  Note: Due to low BP pain meds are held at this time per on call. If BP improves pain meds will be given.      Problem: Knowledge Deficit - Standard  Goal: Patient and family/care givers will demonstrate understanding of plan of care, disease process/condition, diagnostic tests and medications  Outcome: Progressing  Note: Patient has been updated on plan of care. Will continue to monitor BP and keep map at or above 60.     Problem: Fall Risk  Goal: Patient will remain free from falls  Outcome: Progressing   The patient is Watcher - Medium risk of patient condition declining or worsening    Shift Goals  Clinical Goals: monitor bp, pain managment  Patient Goals: pain management  Family Goals: n/a    Progress made toward(s) clinical / shift goals:      Patient is not progressing towards the following goals: will continue to monitor BP and pain       Problem: Pain - Standard  Goal: Alleviation of pain or a reduction in pain to the patient’s comfort goal  Outcome: Not Progressing  Note: Due to low BP pain meds are held at this time per on call. If BP improves pain meds will be given.

## 2023-01-20 NOTE — PROCEDURES
Vascular Access Team       Before placing PICC, discussion was had with vick ZHOU as well as PICC , related to negative blood cultures on 1/19/23 at 1340.  Clearance was given for placement, as it was felt by MD that likelihood for positive blood cultures was slim, and patient is on vasopressor therapy.     Date of Insertion: 1/20/23  Arm Circumference: 27.5  Internal length: 34  External Length: HUB  Vein Occupancy %: 40   Reason for PICC: Vasopressor Therapy  Labs: WBC 14.1, , BUN 10, Cr 0.95, GFR 73, INR NA     Consents confirmed, vessel patency confirmed with ultrasound. Risks and benefits of procedure explained to patient and education regarding central line associated bloodstream infections provided. Questions answered. Patient has rheumatoid arthritis and bilateral shoulder movement is limited by discomfort.  Education was reinforced that movement of the right upper extremity within her pain tolerance was important for limiting potential of blood clota.     PICC placed in LUE per licensed provider order with ultrasound guidance.  5 Fr, double lumen PICC placed in brachial vein after 1 attempt(s). 2 mL of 1% lidocaine injected intradermally at the insertion site. A 21 gauge microintroducer needle was visualized entering the vein and modified Seldinger technique was used to obtain access to the vein. 34 cm catheter inserted and brisk blood return was observed from each lumen upon aspiration. Line secured at the hub cm marker. TCS stylet removed and observed to be fully intact. Each lumen flushed using pulsatile method without resistance with 10 mL 0.9% normal saline. PICC line secured with Biopatch and Tegaderm.     PICC tip placement location is confirmed by nurse to be in the Superior Vena Cava (SVC) utilizing 3CG technology. PICC line is appropriate for use at this time. Patient tolerated procedure well, without complications.  Patient condition relayed to primary RN or ordering  physician via this post procedure note in the EMR.      Ultrasound images uploaded to PACS and viewable in the EMR - yes  Ultrasound imaged printed and placed in paper chart - no     [employer] [facility] ref # [id number], Lot # [id number], Expiration Date 10/31/23

## 2023-01-20 NOTE — PROGRESS NOTES
52 year old female presented with abdominal pain and diarrhea - I was called regarding hypotension despite IVF.    SBPs in the 70s, LA is normal, mild light headedness but she is non-toxic. Hgb has downtrended but no obvious source of hemorrhage. She is acidotic in the setting of diarrhea which is likely fueling her low BP. She will be placed on a bicarb infusion and admitted to the IMCU for low dose vasopressors and remain under the care of the hospitalists. If a critical care consult is required please reach out.     Marv Carpenter M.D.

## 2023-01-20 NOTE — PROGRESS NOTES
Notified MD Carlie that PIV inserted by ultrasound upon pt's arrival to unit infiltrated. Pt due to get thiamine and metronadazole, unable to give medication's d/t lack of access/compatibility of medications. Per carlie, hold bicarb, and restart when thiamine and metronadazole are completed.

## 2023-01-20 NOTE — ASSESSMENT & PLAN NOTE
Supplements of been ordered  Remains dependent on tube feeds because of low oral intake  Monitor for re-feeding syndrome

## 2023-01-20 NOTE — CONSULTS
Date of Consultation:  1/20/2023    Patient: : Mary Martinez  MRN: [id number]    Referring Physician:  Dr. Florence Painter     GI:Blossom Peres M.D.     Reason for Consultation: Abd pain, and diarrhea.     History of Present Illness:   The patient is a 52 years old female with a medical history of rheumatoid arthritis, previous kidney injury, anxiety, asthma, GERD, chronic abdominal pain, history of pancreatitis, irregular bowel movement.  This time, the patient is admitted for low blood pressure, abdominal pain and the frequent bowel movement.  GI team is consulted for abdominal pain and the diarrhea.     GI team saw the patient at the bedside today in the [facility].    Regarding the GI medical history, the patient had EGD and EUS in the September 2022 the patient also had a celiac plexus injection with a EUS support.  The patient reported that her symptoms only improved for maybe 3 weeks after the procedure, and that she has no intention to repeat again.    Upon arrival, the patient got a CAT scan of abdomen, which show more dilatatin of CBD.     Today in the [facility], patient has no bowel movement yet today.  The patient continued to have 5-8 out of 10 abdominal pain from the middle abdomen.  The discomfort is not getting better after fasting.  No evidence of acute abdomen.  No overt nausea or vomiting.      Past Medical History:   Diagnosis Date    Chronic pain 04/24/2020    Due to RA    Pneumonia 10/2019    Rheumatoid arthritis(714.0) 2003    Acute renal failure (ARF) ([facility])     Allergy     Anemia     Anxiety     Arthritis     Rheumatoid -follows with rheumatologist. Has several fractures due to RA.    ASTHMA     inhalers prn    Blood transfusion without reported diagnosis     Bowel habit changes     4/24/20-constipation and diarrhea.    Bronchitis last approx 2018    Dental disorder     no teeth upper-does not wear her denture. Broken teeth on bottom.    Depression     GERD (gastroesophageal reflux disease)     GIB  (gastrointestinal bleeding)     Head ache     Heart burn     taking famotidine    Hiatus hernia syndrome     History of pancreatitis     Hypokalemia     Hyponatremia     Idiopathic chronic pancreatitis (HCC)     Indigestion     taking famotidine    Intractable nausea and vomiting     Kidney disease     Pain     CPS-everywhere    PONV (postoperative nausea and vomiting)     Psychiatric problem     anxiety and depression    Rheumatoid aortitis     SMAS (superior mesenteric artery syndrome) (HCC)     Substance abuse (HCC)          Past Surgical History:   Procedure Laterality Date    WI UPPER GI ENDOSCOPY,DIAGNOSIS N/A 9/9/2022    Procedure: ENDOSCOPIC ULTRASOUND- UPPER;  Surgeon: Jorge Brooke M.D.;  Location: [facility];  Service: EUS    EGD W/ENDOSCOPIC ULTRASOUND N/A 9/9/2022    Procedure: EGD, WITH ENDOSCOPIC US;  Surgeon: Jorge Brooke M.D.;  Location: [facility];  Service: EUS    WI ENDOSCOPIC US EXAM, ESOPH  4/29/2020    Procedure: EGD, WITH ENDOSCOPIC US;  Surgeon: Jorge Brooke M.D.;  Location: [facility];  Service: Gastroenterology    GASTROSCOPY-ENDO  4/29/2020    Procedure: GASTROSCOPY;  Surgeon: Jorge Brooke M.D.;  Location: [facility];  Service: Gastroenterology    EGD WITH ASP/BX  4/29/2020    Procedure: EGD, WITH ASPIRATION BIOPSY - POSS FNA;  Surgeon: Jorge Brooke M.D.;  Location: [facility];  Service: Gastroenterology    WI EXPLORATORY OF ABDOMEN N/A 10/4/2019    Procedure: LAPAROTOMY, EXPLORATORY AND REPAIR OF DUODENAL PERFORATION;  Surgeon: James Dumont M.D.;  Location: SURGERY [facility];  Service: General    COLONOSCOPY  2018    with upper endoscopy    FINGER ARTHROPLASTY Right 6/5/2017    Procedure: FINGER ARTHROPLASTY - LONG, RING AND SMALL VOLAR PLATE;  Surgeon: Lobo Rosen M.D.;  Location: SURGERY SAME DAY [facility];  Service:     FINGER AMPUTATION  6/5/2017     Procedure: FINGER AMPUTATION - LONG, RING AND SMALL AT THE PROXIMAL INTERPHALANGEAL JOINT;  Surgeon: Lobo Rosen M.D.;  Location: SURGERY SAME DAY [facility];  Service:     FINGER ARTHROPLASTY Right 4/17/2017    Procedure: FINGER ARTHROPLASTY ;  Surgeon: Lobo Rosen M.D.;  Location: SURGERY SAME DAY [facility];  Service:     FINGER AMPUTATION Right 9/14/2016    Procedure: FINGER AMPUTATION INDEX;  Surgeon: Lobo Rosen M.D.;  Location: SURGERY SAME DAY [facility];  Service:     IRRIGATION & DEBRIDEMENT ORTHO Right 9/11/2016    Procedure: IRRIGATION & DEBRIDEMENT ORTHO - right index finger;  Surgeon: Madhu Rosen M.D.;  Location: SURGERY [facility];  Service:     FUSION, PIP JOINT, TOE Right 8/29/2016    Procedure: RE-DO INDEX FINGER PROXIMAL INTERPHALANGEAL ARTHRODESIS;  Surgeon: Lobo Rosen M.D.;  Location: SURGERY SAME DAY [facility];  Service:     BONE GRAFT Right 8/29/2016    Procedure: BONE GRAFT - DISTAL RADIUS ;  Surgeon: Lobo Rosen M.D.;  Location: SURGERY SAME DAY [facility];  Service:     ARTHRODESIS Right 5/6/2016    Procedure: ARTHRODESIS INDEX FINGER PROXIMAL INTERPHALANGEAL;  Surgeon: Lobo Rosen M.D.;  Location: SURGERY SAME DAY [facility];  Service:     FOOT RECONSTRUCTION RHEUMATIC Left 7/29/2015    Procedure: FOOT RECONSTRUCTION RHEUMATIC;  Surgeon: Heriberto Alves M.D.;  Location: [facility];  Service:     TOE FUSION Right 5/27/2015    Procedure: TOE FUSION 1ST METATARSALPHALANGEAL JOINT;  Surgeon: Heriberto Alves M.D.;  Location: [facility];  Service:     BONE SPUR EXCISION  5/27/2015    Procedure: BONE SPUR EXCISION METATARSAL HEAD 2-3;  Surgeon: Heriberto Alves M.D.;  Location: [facility];  Service:     HAMMERTOE CORRECTION  5/27/2015    Procedure: HAMMERTOE CORRECTION AND SOFT TISSUE RE-ALINGMENT 2-3 ;  Surgeon: Heriberto Alves M.D.;  Location: [facility];   Service:     DENTAL EXTRACTION(S)  2014    all of upper teeth    ABDOMINAL ABSCESS DRAINAGE  9/27/2011    Performed by VERO WRIGHT at SURGERY [facility]    EMANUEL BY LAPAROSCOPY  9/27/2011    Performed by VERO WRIGHT at SURGERY [facility]    APPENDECTOMY  2011    Found out it had ruptured prior to abcess surgery    REPEAT C SECTION  2008    REPEAT C SECTION  2005    REPEAT C SECTION  1999    PRIMARY C SECTION  1991    TONSILLECTOMY  1982    WRIST EXPLORATION Left 1980's    fractured wrist-no hardware       Family History   Problem Relation Age of Onset    Cancer Mother     Heart Disease Mother     Hypertension Mother     Heart Disease Father     Hypertension Father        Social History     Socioeconomic History    Marital status:      Spouse name: Ousmane    Number of children: 5   Tobacco Use    Smoking status: Every Day     Packs/day: 1.00     Years: 30.00     Pack years: 30.00     Types: Cigarettes    Smokeless tobacco: Never   Vaping Use    Vaping Use: Never used   Substance and Sexual Activity    Alcohol use: Never    Drug use: Never    Sexual activity: Not Currently   Other Topics Concern     Service No    Blood Transfusions Yes    Caffeine Concern No    Occupational Exposure No    Hobby Hazards No    Sleep Concern No    Stress Concern Yes    Weight Concern No    Special Diet No    Back Care No    Exercise Yes    Bike Helmet Yes    Seat Belt Yes    Self-Exams Yes   Social History Narrative    ** Merged History Encounter **          Social Determinants of Health     Financial Resource Strain: Low Risk     Difficulty of Paying Living Expenses: Not hard at all   Food Insecurity: No Food Insecurity    Worried About Running Out of Food in the Last Year: Never true    Ran Out of Food in the Last Year: Never true   Transportation Needs: No Transportation Needs    Lack of Transportation (Medical): No    Lack of Transportation (Non-Medical): No     Constitutional: Denies  fevers.  Cardiovascular: Denies chest pain.   Respiratory: Denies shortness of breath.  Gastrointestinal/Hepatic: Per H/P.   Skin/Breast: Denies rash   Psychiatric: No complaints      HEENT: grossly normal.  Abdomen: Soft, Some tenderness.   Skin: No erythema, No rash.  Lower limbs: normal, no pitting edema.   Neurologic: Alert & oriented x 3, Normal motor function, No focal deficits noted.  PSY: stable mood.         Physical Exam:  Vitals:    01/20/23 0400 01/20/23 0700 01/20/23 0800 01/20/23 0900   BP: 109/59 90/50 96/56 96/58   Pulse: (!) 107 (!) 101 95 85   Resp: 17 13 18 (!) 30   Temp: 36.2 °C (97.1 °F)  35.9 °C (96.6 °F)    TempSrc: Temporal      SpO2: 96% 97% 97% 98%   Weight:       Height:                 Labs:  Recent Labs     01/19/23  1213 01/19/23  2255 01/20/23  0341 01/20/23  1100   WBC 20.2* 13.1* 14.1*  --    RBC 4.16* 2.77* 2.71*  --    HEMOGLOBIN 13.5 9.0* 8.7* 8.9*   HEMATOCRIT 41.3 28.2* 27.2* 26.4*   MCV 99.3* 101.8* 100.4*  --    MCH 32.5 32.5 32.1  --    MCHC 32.7* 31.9* 32.0*  --    RDW 57.7* 59.9* 59.6*  --    PLATELETCT 393 249 249  --    MPV 10.6 10.8 10.6  --      Recent Labs     01/19/23 2255 01/20/23  0341 01/20/23  1100   SODIUM 137 137 140   POTASSIUM 3.1* 3.4* 2.9*   CHLORIDE 113* 115* 113*   CO2 9* 10* 15*   GLUCOSE 117* 101* 107*   BUN 12 10 9           Recent Labs     01/19/23  1213 01/19/23  2255 01/20/23  0341   ASTSGOT 9* 5* 6*   ALTSGPT 5 <5 <5   TBILIRUBIN <0.2 <0.2 <0.2   ALKPHOSPHAT 135* 98 96   GLOBULIN 3.4 2.5 2.5   AMMONIA  --  19  --          Imaging:  EC-ECHOCARDIOGRAM COMPLETE W/O CONT  Results Will be Available after Interpretation by Cardiologist.  IR-PICC LINE PLACEMENT W/ GUIDANCE > AGE 5  Narrative: HISTORY/REASON FOR EXAM:   PICC placement.     TECHNIQUE/EXAM DESCRIPTION AND NUMBER OF VIEWS:   PICC line insertion with   ultrasound guidance.  The procedure was performed using maximal sterile   barrier technique including sterile gown, mask, cap, and donning of    sterile gloves following appropriate hand hygiene and/or sterile scrub.   Patient skin site was prepped with 2% Chlorhexidine solution.    FINDINGS:  PICC line insertion with Ultrasound Guidance was performed by   qualified nursing staff without the assistance of a Radiologist. PICC   positioning appropriateness confirmed by 3CG technology; chest xray only   needed in the instance 3CG unable to confirm placement.   Impression:              Ultrasound-guided PICC placement performed by qualified nursing staff as   above.             Impressions:  52 years old female presented to the hospital again for chronic abdominal pain, irregular bowel movement, hypotension/poor nutrition.  GI team is consulted for abdominal pain and irregular bowel movement.    After fasting since yesterday, the patient's diarrhea improved.  The patient denies any bowel movement today.  However the pain persists.    The patient has SBFT and CT scan in 2022, which showed no evidence of SMA syndrome.     Had EGD EUS Celiac plexus injection in Sep 2022, no overt improvement, no major pathology.     Recs:  Agree with the [place] feeding tube for nutrition support.   Taper opioid.   Fasting improves the diarrhea, which make osmotic diarrhea more likely, if recurs, consider stool osmo test.   Celiac panel.   ESR could be from RA, consider stool calprotectin for GI inflammation.   MRCP to exclude pancreatic head lesion.     If all tests are not diagnostic, we would recommend [facility] (her old GI team) follow up as outpatient.   If MRCP is positive, please call us back.     GI signs off on 1/20.           This note was generated using voice recognition software which has a small chance of producing errors of grammar and possibly content. I have made every reasonable attempt to find and correct any obvious errors, but expect that some may not be found prior to finalization of this note.

## 2023-01-20 NOTE — H&P
Hospital Medicine History & Physical Note    Date of Service  1/19/2023    Primary Care Physician  Fidencio Christopher D.O.    Consultants  GI    Specialist Names: Dr. Cruz    Code Status  Full Code    Chief Complaint  Chief Complaint   Patient presents with    Abdominal Pain    Back Pain     Pain started 1 week ago.  Also c/o n/v/d.  Pt states back pain is from neck to mid back       History of Presenting Illness  Mary Martinez is a 52 y.o. female who presented 1/19/2023 with past medical history of rheumatoid arthritis, SMA syndrome, chronic abdominal pain, history of duodenal perforation in 2019, history of cholecystectomy presented to the hospital for abdominal pain.  Patient does have chronic epigastric abdominal pain (present for the past 2 years.  Premedications and had a celiac block.  The past 2 weeks she is to have lower abdominal pain associated with nausea, vomiting, diarrhea.  Abdominal pain will be worse after eating food.  She would have a bowel movement 4 times a day of watery stool.  She denies any fever, chills, dysuria.  Patient states that she recently lost her insurance and was unable to take home medications including rheumatoid arthritis patient or pain medications.  Her last real meal was 2 weeks ago.  She has been tolerating liquids.    CT of the abdomen did not show any acute abnormalities  EKG found normal sinus rhythm with biatrial lodgment  I discussed the plan of care with patient.    Review of Systems  Review of Systems   Constitutional:  Positive for malaise/fatigue. Negative for chills, diaphoresis and fever.   HENT:  Negative for congestion, ear discharge, ear pain, hearing loss, nosebleeds, sinus pain, sore throat and tinnitus.    Eyes:  Negative for blurred vision, double vision, photophobia and pain.   Respiratory:  Negative for cough, hemoptysis, sputum production, shortness of breath, wheezing and stridor.    Cardiovascular:  Negative for chest pain, palpitations,  orthopnea, claudication, leg swelling and PND.   Gastrointestinal:  Positive for abdominal pain, diarrhea, nausea and vomiting. Negative for blood in stool, constipation, heartburn and melena.   Genitourinary:  Negative for dysuria, flank pain, frequency, hematuria and urgency.   Musculoskeletal:  Negative for back pain, falls, joint pain, myalgias and neck pain.   Skin:  Negative for itching and rash.   Neurological:  Negative for dizziness, tingling, tremors, weakness and headaches.   Endo/Heme/Allergies:  Negative for environmental allergies and polydipsia. Does not bruise/bleed easily.   Psychiatric/Behavioral:  Negative for depression, hallucinations, substance abuse and suicidal ideas.      Past Medical History   has a past medical history of Acute renal failure (ARF) ([facility]), Allergy, Anemia, Anxiety, Arthritis, ASTHMA, Blood transfusion without reported diagnosis, Bowel habit changes, Bronchitis (last approx 2018), Chronic pain (04/24/2020), Dental disorder, Depression, GERD (gastroesophageal reflux disease), GIB (gastrointestinal bleeding), Head ache, Heart burn, Hiatus hernia syndrome, History of pancreatitis, Hypokalemia, Hyponatremia, Idiopathic chronic pancreatitis (HCC), Indigestion, Intractable nausea and vomiting, Kidney disease, Pain, Pneumonia (10/2019), PONV (postoperative nausea and vomiting), Psychiatric problem, Rheumatoid aortitis, Rheumatoid arthritis(714.0) (2003), SMAS (superior mesenteric artery syndrome) (HCC), and Substance abuse (HCC).    Surgical History   has a past surgical history that includes abdominal abscess drainage (9/27/2011); toe fusion (Right, 5/27/2015); bone spur excision (5/27/2015); hammertoe correction (5/27/2015); foot reconstruction rheumatic (Left, 7/29/2015); arthrodesis (Right, 5/6/2016); fusion, pip joint, toe (Right, 8/29/2016); bone graft (Right, 8/29/2016); irrigation & debridement ortho (Right, 9/11/2016); finger amputation (Right, 9/14/2016); finger  arthroplasty (Right, 4/17/2017); finger arthroplasty (Right, 6/5/2017); finger amputation (6/5/2017); pr exploratory of abdomen (N/A, 10/4/2019); tonsillectomy (1982); primary c section (1991); repeat c section (1999); repeat c section (2005); repeat c section (2008); appendectomy (2011); nicholas by laparoscopy (9/27/2011); wrist exploration (Left, 1980's); colonoscopy (2018); dental extraction(s) (2014); pr endoscopic us exam, esoph (4/29/2020); gastroscopy-endo (4/29/2020); egd with asp/bx (4/29/2020); pr upper gi endoscopy,diagnosis (N/A, 9/9/2022); and egd w/endoscopic ultrasound (N/A, 9/9/2022).     Family History  family history includes Cancer in her mother; Heart Disease in her father and mother; Hypertension in her father and mother.   Family history reviewed with patient. There is no family history that is pertinent to the chief complaint.     Social History   reports that she has been smoking cigarettes. She has a 30.00 pack-year smoking history. She has never used smokeless tobacco. She reports that she does not drink alcohol and does not use drugs.    Allergies  Allergies   Allergen Reactions    Penicillins Anaphylaxis and Hives     Tolerates cephalosporins; reports throat swelling with PCN    Abilify Unspecified     Multiple side effects    Aripiprazole Nausea     Spasms, shaking      Nitrous Oxide Vomiting       Medications  Prior to Admission Medications   Prescriptions Last Dose Informant Patient Reported? Taking?   Cyanocobalamin (B-12) 1000 MCG Tab  Patient Yes No   Sig: Take 1,000 mg by mouth every day.   Etanercept (ENBREL) 25 MG Recon Soln  Patient Yes No   Sig: Inject 50 mg under the skin every 7 days. Tuesday   PARoxetine (PAXIL) 30 MG Tab  Patient Yes No   Sig: Take 60 mg by mouth every evening.   QUEtiapine (SEROQUEL) 100 MG Tab  Patient Yes No   Sig: Take 100 mg by mouth every evening.   QUEtiapine (SEROQUEL) 25 MG Tab  Patient Yes No   Sig: Take 25 mg by mouth 2 times a day as needed for  Anxiety.   folic acid (FOLVITE) 1 MG Tab  Patient No No   Sig: Take 1 Tablet by mouth 2 times a day.   midodrine (PROAMATINE) 10 MG tablet   No No   Sig: Take 1 Tablet by mouth 3 times a day with meals.   ondansetron (ZOFRAN ODT) 8 MG TABLET DISPERSIBLE  Patient Yes No   Sig: Take 8 mg by mouth every 8 hours as needed for Nausea.   pantoprazole (PROTONIX) 40 MG Tablet Delayed Response   No No   Sig: Take 1 Tablet by mouth every day.   Patient taking differently: Take 40 mg by mouth every evening.   sucralfate (CARAFATE) 1 GM Tab  Patient No No   Sig: Take 1 Tablet by mouth 4 Times a Day,Before Meals and at Bedtime.   thiamine (THIAMINE) 100 MG tablet  Patient No No   Sig: Take 1 Tablet by mouth 2 times a day.      Facility-Administered Medications: None       Physical Exam  Temp:  [36.9 °C (98.5 °F)] 36.9 °C (98.5 °F)  Pulse:  [] 99  Resp:  [16-20] 16  BP: ()/(59-74) 98/61  SpO2:  [97 %-98 %] 98 %  Blood Pressure: 98/61   Temperature: 36.9 °C (98.5 °F)   Pulse: 99   Respiration: 16   Pulse Oximetry: 98 %       Physical Exam  Vitals and nursing note reviewed.   Constitutional:       General: She is not in acute distress.     Appearance: Normal appearance. She is not ill-appearing, toxic-appearing or diaphoretic.   HENT:      Head: Normocephalic and atraumatic.      Nose: No congestion or rhinorrhea.      Mouth/Throat:      Pharynx: No oropharyngeal exudate or posterior oropharyngeal erythema.   Eyes:      General: No scleral icterus.  Neck:      Vascular: No carotid bruit or JVD.   Cardiovascular:      Rate and Rhythm: Normal rate and regular rhythm.      Pulses: Normal pulses.      Heart sounds: Normal heart sounds. No murmur heard.    No friction rub. No gallop.   Pulmonary:      Effort: Pulmonary effort is normal. No respiratory distress.      Breath sounds: No stridor. No wheezing, rhonchi or rales.   Abdominal:      General: Abdomen is flat. There is no distension.      Palpations: There is no mass.       Tenderness: There is abdominal tenderness. There is no left CVA tenderness, guarding or rebound.      Hernia: No hernia is present.   Musculoskeletal:         General: No swelling. Normal range of motion.      Cervical back: No rigidity. No muscular tenderness.      Right lower leg: No edema.      Left lower leg: No edema.   Lymphadenopathy:      Cervical: No cervical adenopathy.   Skin:     General: Skin is warm and dry.      Capillary Refill: Capillary refill takes more than 3 seconds.      Coloration: Skin is not jaundiced or pale.      Findings: No bruising or erythema.   Neurological:      Mental Status: She is alert.       Laboratory:  Recent Labs     01/19/23  1213   WBC 20.2*   RBC 4.16*   HEMOGLOBIN 13.5   HEMATOCRIT 41.3   MCV 99.3*   MCH 32.5   MCHC 32.7*   RDW 57.7*   PLATELETCT 393   MPV 10.6     Recent Labs     01/19/23  1213   SODIUM 135   POTASSIUM 3.4*   CHLORIDE 108   CO2 7*   GLUCOSE 92   BUN 12   CREATININE 1.06   CALCIUM 9.0     Recent Labs     01/19/23  1213   ALTSGPT 5   ASTSGOT 9*   ALKPHOSPHAT 135*   TBILIRUBIN <0.2   LIPASE 21   GLUCOSE 92         No results for input(s): NTPROBNP in the last 72 hours.      Recent Labs     01/19/23  1213   TROPONINT 14       Imaging:  CT-CTA COMPLETE THORACOABDOMINAL AORTA   Final Result      1.  No evidence for aortic aneurysm or dissection.   2.  No acute cardiopulmonary disease.   3.  Biliary dilation, increased from prior exam concerning for distal stricture, stone or mass.   4.  Prior cholecystectomy.   5.  Bilateral nonobstructing kidney stones.   6.  Mildly enlarged bilateral external iliac and inguinal lymph nodes, of uncertain etiology.                        EC-ECHOCARDIOGRAM COMPLETE W/O CONT    (Results Pending)       X-Ray:  I have personally reviewed the images and compared with prior images.  EKG:  I have personally reviewed the images and compared with prior images.    Assessment/Plan:  Justification for Admission Status  I anticipate  this patient will require at least two midnights for appropriate medical management, necessitating inpatient admission because starvation ketoacidosis    Patient will need a Telemetry bed on MEDICAL service .  The need is secondary to elevation ketoacidosis.    * Starvation ketoacidosis- (present on admission)  Assessment & Plan  Related to her underlying SMA syndrome which causes abdominal pain after eating food  IV thiamine  Monitor for refeeding syndrome-monitor potassium and phosphorus closely  Start IV hydration  Dietary consult    Bipolar 1 disorder (HCC)  Assessment & Plan  Restart paroxetine and Seroquel    Diarrhea  Assessment & Plan  Obtain stool cultures and C. difficile    Protein-calorie malnutrition, severe (HCC)- (present on admission)  Assessment & Plan  Supplements of been ordered  Check B12, vitamin D, iron    Cachexia (HCC)- (present on admission)  Assessment & Plan  Dietary consult    SMAS (superior mesenteric artery syndrome) ([facility])- (present on admission)  Assessment & Plan  CTA was normal  Pain control with oral and IV narcotics  Patient will likely need a feeding tube.  Since this is SMA syndrome she would likely need J-tube.  Surgery may need to be consulted in a.m.  I have consulted with Dr. Anderson follows her as an outpatient    Hypokalemia- (present on admission)  Assessment & Plan  Monitor closely for refeeding syndrome    High anion gap metabolic acidosis- (present on admission)  Assessment & Plan  Denies ingesting abnormal substance  Likely due to starvation ketoacidosis    Arthritis, rheumatoid (HCC)- (present on admission)  Assessment & Plan  Check ESR  Will need to restart her home medications after discharge    Leukocytosis- (present on admission)  Assessment & Plan  Pro-Vishnu is elevated  Start Flagyl and ceftriaxone        VTE prophylaxis: SCDs/TEDs

## 2023-01-20 NOTE — ASSESSMENT & PLAN NOTE
CTA was normal  MRCP normal  General surgery following  GS wants to defer G tube placement  Cant go home with NG/cortrak in place  Monitor, encourage oral intake

## 2023-01-20 NOTE — PROGRESS NOTES
Rapid response called for hypotension.     On call hospitalst called to bedside    500ml NS bolus ordered.

## 2023-01-20 NOTE — PROGRESS NOTES
[facility] Medicine Daily Progress Note    Date of Service  1/20/2023    Chief Complaint  Mary Martinez is a 52 y.o. female admitted 1/19/2023 with abdominal pain and diarrhea    Hospital Course    Mary Martinez is a 52 y.o. female who presented 1/19/2023 with past medical history of rheumatoid arthritis, SMA syndrome, chronic abdominal pain, history of duodenal perforation in 2019, history of cholecystectomy presented to the hospital for abdominal pain.  Patient does have chronic epigastric abdominal pain (present for the past 2 years.  Premedications and had a celiac block.  The past 2 weeks she is to have lower abdominal pain associated with nausea, vomiting, diarrhea.  Abdominal pain will be worse after eating food.  She would have a bowel movement 4 times a day of watery stool.  She denies any fever, chills, dysuria.  Patient states that she recently lost her insurance and was unable to take home medications including rheumatoid arthritis patient or pain medications.  Her last real meal was 2 weeks ago.  She has been tolerating liquids.    GI consulted and they recommended MRCP to evaluate it further.  I discussed plan of care with patient and ordered cortrak.    Interval Problem Update    01/20/23  I evaluated and examined her at the bedside  She reported that she is feeling nauseated.  She also reported generalized pain due to arthritis.  I discussed with her regarding placement of feeding tube due to poor oral intake and ongoing nausea and vomiting.  She expressed that she would like to see how she would do without feeding tube and she would work on her oral intake.  I discussed plan of care with gastroenterologist.    I have discussed this patient's plan of care and discharge plan at IDT rounds today with Case Management, Nursing, Nursing leadership, and other members of the IDT team.    Consultants/Specialty  GI    Code Status  Full Code    Disposition  Patient is not medically cleared for  discharge.   Anticipate discharge to to home with close outpatient follow-up.  I have placed the appropriate orders for post-discharge needs.    Review of Systems  Review of Systems   Constitutional:  Positive for malaise/fatigue. Negative for chills, fever and weight loss.   HENT:  Negative for hearing loss and tinnitus.    Eyes:  Negative for blurred vision, double vision, photophobia and pain.   Respiratory:  Negative for cough, sputum production and shortness of breath.    Cardiovascular:  Negative for chest pain, palpitations, orthopnea and leg swelling.   Gastrointestinal:  Positive for abdominal pain and nausea. Negative for constipation, diarrhea and vomiting.   Genitourinary:  Negative for dysuria, frequency and urgency.   Musculoskeletal:  Positive for joint pain and myalgias. Negative for back pain and neck pain.   Skin:  Negative for rash.   Neurological:  Positive for weakness. Negative for dizziness, tingling, tremors, sensory change, speech change, focal weakness and headaches.   Psychiatric/Behavioral:  Negative for hallucinations and substance abuse.    All other systems reviewed and are negative.     Physical Exam  Temp:  [35.9 °C (96.6 °F)-37.5 °C (99.5 °F)] 35.9 °C (96.6 °F)  Pulse:  [] 83  Resp:  [12-30] 19  BP: ()/(42-63) 103/62  SpO2:  [92 %-98 %] 98 %    Physical Exam  Vitals reviewed.   Constitutional:       General: She is not in acute distress.     Appearance: Normal appearance. She is ill-appearing.   HENT:      Head: Normocephalic and atraumatic.      Nose: No congestion.   Eyes:      General:         Right eye: No discharge.         Left eye: No discharge.      Pupils: Pupils are equal, round, and reactive to light.   Cardiovascular:      Rate and Rhythm: Normal rate and regular rhythm.      Pulses: Normal pulses.      Heart sounds: Normal heart sounds. No murmur heard.  Pulmonary:      Effort: Pulmonary effort is normal. No respiratory distress.      Breath sounds: Normal  breath sounds. No stridor.   Abdominal:      General: Bowel sounds are normal. There is no distension.      Palpations: Abdomen is soft.      Tenderness: There is abdominal tenderness (Mild).   Musculoskeletal:         General: No swelling or tenderness. Normal range of motion.      Cervical back: Normal range of motion. No rigidity.      Comments: Missing finger and hands.   Skin:     General: Skin is warm.      Capillary Refill: Capillary refill takes less than 2 seconds.      Coloration: Skin is not jaundiced or pale.      Findings: No bruising.   Neurological:      General: No focal deficit present.      Mental Status: She is alert and oriented to person, place, and time.      Cranial Nerves: No cranial nerve deficit.   Psychiatric:         Mood and Affect: Mood normal.         Behavior: Behavior normal.       Fluids    Intake/Output Summary (Last 24 hours) at 1/20/2023 1547  Last data filed at 1/20/2023 0400  Gross per 24 hour   Intake 1394.16 ml   Output 700 ml   Net 694.16 ml       Laboratory  Recent Labs     01/19/23  1213 01/19/23  2255 01/20/23  0341 01/20/23  1100   WBC 20.2* 13.1* 14.1*  --    RBC 4.16* 2.77* 2.71*  --    HEMOGLOBIN 13.5 9.0* 8.7* 8.9*   HEMATOCRIT 41.3 28.2* 27.2* 26.4*   MCV 99.3* 101.8* 100.4*  --    MCH 32.5 32.5 32.1  --    MCHC 32.7* 31.9* 32.0*  --    RDW 57.7* 59.9* 59.6*  --    PLATELETCT 393 249 249  --    MPV 10.6 10.8 10.6  --      Recent Labs     01/19/23  2255 01/20/23  0341 01/20/23  1100   SODIUM 137 137 140   POTASSIUM 3.1* 3.4* 2.9*   CHLORIDE 113* 115* 113*   CO2 9* 10* 15*   GLUCOSE 117* 101* 107*   BUN 12 10 9   CREATININE 1.05 0.95 0.88   CALCIUM 7.2* 7.3* 7.7*                   Imaging  EC-ECHOCARDIOGRAM COMPLETE W/O CONT         IR-PICC LINE PLACEMENT W/ GUIDANCE > AGE 5   Final Result                  Ultrasound-guided PICC placement performed by qualified nursing staff as    above.          CT-CTA COMPLETE THORACOABDOMINAL AORTA   Final Result      1.  No  evidence for aortic aneurysm or dissection.   2.  No acute cardiopulmonary disease.   3.  Biliary dilation, increased from prior exam concerning for distal stricture, stone or mass.   4.  Prior cholecystectomy.   5.  Bilateral nonobstructing kidney stones.   6.  Mildly enlarged bilateral external iliac and inguinal lymph nodes, of uncertain etiology.                        [id number]    (Results Pending)        Assessment/Plan  * Starvation ketoacidosis- (present on admission)  Assessment & Plan  Related to her underlying SMA syndrome which causes abdominal pain after eating food  IV thiamine  Monitor for refeeding syndrome-monitor potassium and phosphorus closely  Start IV hydration  Dietary consult  Started him on bicarb gtt. due to nongap acidosis.    Bipolar 1 disorder ([facility])  Assessment & Plan  Restart paroxetine and Seroquel    Diarrhea  Assessment & Plan  Obtain stool cultures and C. difficile    Protein-calorie malnutrition, severe ([facility])- (present on admission)  Assessment & Plan  Supplements of been ordered  Check B12, vitamin D, iron    Cachexia ([facility])- (present on admission)  Assessment & Plan  Dietary consult    SMAS (superior mesenteric artery syndrome) ([facility])- (present on admission)  Assessment & Plan  CTA was normal  Pain control with oral and IV narcotics  Patient will likely need a feeding tube.  Since this is SMA syndrome she would likely need feeding tube.  I placed order for Cortrak.  I did personally discussed case with gastroenterologist   I ordered MRCP.    Hypokalemia- (present on admission)  Assessment & Plan  Monitor closely for refeeding syndrome    High anion gap metabolic acidosis- (present on admission)  Assessment & Plan  Denies ingesting abnormal substance  Likely due to starvation ketoacidosis    Arthritis, rheumatoid (HCC)- (present on admission)  Assessment & Plan  Check ESR  Will need to restart her home medications after discharge    Leukocytosis- (present on  admission)  Assessment & Plan  Pro-Vishnu is elevated  Start Flagyl and ceftriaxone      I discussed plan of care during multidisciplinary rounds regarding patient's current medical condition and plan of care.      I personally discussed case with gastroenterologist Dr. Peres regarding her current medical condition and plan of care.    VTE prophylaxis: SCDs/TEDs    Holding pharmacological DVT prophylaxis due to significant anemia and hemoglobin is trending down.    I have performed a physical exam and reviewed and updated ROS and Plan today (1/20/2023). In review of yesterday's note (1/19/2023), there are no changes except as documented above.

## 2023-01-21 ENCOUNTER — APPOINTMENT (OUTPATIENT)
Dept: RADIOLOGY | Facility: MEDICAL CENTER | Age: 53
DRG: 393 | End: 2023-01-21
Attending: INTERNAL MEDICINE
Payer: MEDICAID

## 2023-01-21 LAB
1,25(OH)2D3 SERPL-MCNC: 12.4 PG/ML (ref 19.9–79.3)
ALBUMIN SERPL BCP-MCNC: 2.6 G/DL (ref 3.2–4.9)
ALBUMIN/GLOB SERPL: 1.1 G/DL
ALP SERPL-CCNC: 83 U/L (ref 30–99)
ALT SERPL-CCNC: <5 U/L (ref 2–50)
ANION GAP SERPL CALC-SCNC: 10 MMOL/L (ref 7–16)
AST SERPL-CCNC: 7 U/L (ref 12–45)
BILIRUB SERPL-MCNC: <0.2 MG/DL (ref 0.1–1.5)
BUN SERPL-MCNC: 9 MG/DL (ref 8–22)
CALCIUM ALBUM COR SERPL-MCNC: 8.9 MG/DL (ref 8.5–10.5)
CALCIUM SERPL-MCNC: 7.8 MG/DL (ref 8.5–10.5)
CHLORIDE SERPL-SCNC: 109 MMOL/L (ref 96–112)
CO2 SERPL-SCNC: 18 MMOL/L (ref 20–33)
CREAT SERPL-MCNC: 0.89 MG/DL (ref 0.5–1.4)
CRP SERPL HS-MCNC: 12.77 MG/DL (ref 0–0.75)
EKG IMPRESSION: NORMAL
ERYTHROCYTE [DISTWIDTH] IN BLOOD BY AUTOMATED COUNT: 57.9 FL (ref 35.9–50)
GFR SERPLBLD CREATININE-BSD FMLA CKD-EPI: 79 ML/MIN/1.73 M 2
GLOBULIN SER CALC-MCNC: 2.4 G/DL (ref 1.9–3.5)
GLUCOSE SERPL-MCNC: 109 MG/DL (ref 65–99)
HCT VFR BLD AUTO: 24.8 % (ref 37–47)
HGB BLD-MCNC: 8.4 G/DL (ref 12–16)
MAGNESIUM SERPL-MCNC: 1.7 MG/DL (ref 1.5–2.5)
MCH RBC QN AUTO: 33.1 PG (ref 27–33)
MCHC RBC AUTO-ENTMCNC: 33.9 G/DL (ref 33.6–35)
MCV RBC AUTO: 97.6 FL (ref 81.4–97.8)
PHOSPHATE SERPL-MCNC: 1.8 MG/DL (ref 2.5–4.5)
PLATELET # BLD AUTO: 268 K/UL (ref 164–446)
PMV BLD AUTO: 10.5 FL (ref 9–12.9)
POTASSIUM SERPL-SCNC: 3.3 MMOL/L (ref 3.6–5.5)
PREALB SERPL-MCNC: 10.6 MG/DL (ref 18–38)
PROCALCITONIN SERPL-MCNC: 0.34 NG/ML
PROT SERPL-MCNC: 5 G/DL (ref 6–8.2)
RBC # BLD AUTO: 2.54 M/UL (ref 4.2–5.4)
SODIUM SERPL-SCNC: 137 MMOL/L (ref 135–145)
WBC # BLD AUTO: 8.2 K/UL (ref 4.8–10.8)

## 2023-01-21 PROCEDURE — A9270 NON-COVERED ITEM OR SERVICE: HCPCS | Performed by: INTERNAL MEDICINE

## 2023-01-21 PROCEDURE — 700111 HCHG RX REV CODE 636 W/ 250 OVERRIDE (IP): Performed by: INTERNAL MEDICINE

## 2023-01-21 PROCEDURE — 93010 ELECTROCARDIOGRAM REPORT: CPT | Performed by: INTERNAL MEDICINE

## 2023-01-21 PROCEDURE — 700102 HCHG RX REV CODE 250 W/ 637 OVERRIDE(OP): Performed by: INTERNAL MEDICINE

## 2023-01-21 PROCEDURE — 700111 HCHG RX REV CODE 636 W/ 250 OVERRIDE (IP): Performed by: HOSPITALIST

## 2023-01-21 PROCEDURE — 99232 SBSQ HOSP IP/OBS MODERATE 35: CPT | Performed by: INTERNAL MEDICINE

## 2023-01-21 PROCEDURE — 84134 ASSAY OF PREALBUMIN: CPT

## 2023-01-21 PROCEDURE — 700105 HCHG RX REV CODE 258: Performed by: STUDENT IN AN ORGANIZED HEALTH CARE EDUCATION/TRAINING PROGRAM

## 2023-01-21 PROCEDURE — 770001 HCHG ROOM/CARE - MED/SURG/GYN PRIV*

## 2023-01-21 PROCEDURE — 99252 IP/OBS CONSLTJ NEW/EST SF 35: CPT | Performed by: SURGERY

## 2023-01-21 PROCEDURE — 83735 ASSAY OF MAGNESIUM: CPT

## 2023-01-21 PROCEDURE — 80053 COMPREHEN METABOLIC PANEL: CPT

## 2023-01-21 PROCEDURE — 700111 HCHG RX REV CODE 636 W/ 250 OVERRIDE (IP): Performed by: STUDENT IN AN ORGANIZED HEALTH CARE EDUCATION/TRAINING PROGRAM

## 2023-01-21 PROCEDURE — 700105 HCHG RX REV CODE 258: Performed by: INTERNAL MEDICINE

## 2023-01-21 PROCEDURE — 84100 ASSAY OF PHOSPHORUS: CPT

## 2023-01-21 PROCEDURE — 84145 PROCALCITONIN (PCT): CPT

## 2023-01-21 PROCEDURE — 85027 COMPLETE CBC AUTOMATED: CPT

## 2023-01-21 PROCEDURE — 86140 C-REACTIVE PROTEIN: CPT

## 2023-01-21 RX ORDER — LANOLIN ALCOHOL/MO/W.PET/CERES
400 CREAM (GRAM) TOPICAL DAILY
Status: DISCONTINUED | OUTPATIENT
Start: 2023-01-22 | End: 2023-01-22

## 2023-01-21 RX ORDER — FOLIC ACID 1 MG/1
1 TABLET ORAL 2 TIMES DAILY
Status: DISCONTINUED | OUTPATIENT
Start: 2023-01-21 | End: 2023-01-22

## 2023-01-21 RX ORDER — PROMETHAZINE HYDROCHLORIDE 25 MG/1
12.5-25 TABLET ORAL EVERY 4 HOURS PRN
Status: DISCONTINUED | OUTPATIENT
Start: 2023-01-21 | End: 2023-01-21

## 2023-01-21 RX ORDER — CHOLECALCIFEROL (VITAMIN D3) 125 MCG
1000 CAPSULE ORAL 2 TIMES DAILY
Status: DISCONTINUED | OUTPATIENT
Start: 2023-01-21 | End: 2023-01-21

## 2023-01-21 RX ORDER — FOLIC ACID 1 MG/1
1 TABLET ORAL 2 TIMES DAILY
Status: DISCONTINUED | OUTPATIENT
Start: 2023-01-21 | End: 2023-01-21

## 2023-01-21 RX ORDER — AMOXICILLIN 250 MG
2 CAPSULE ORAL 2 TIMES DAILY
Status: DISCONTINUED | OUTPATIENT
Start: 2023-01-21 | End: 2023-01-21

## 2023-01-21 RX ORDER — BISACODYL 10 MG
10 SUPPOSITORY, RECTAL RECTAL
Status: DISCONTINUED | OUTPATIENT
Start: 2023-01-21 | End: 2023-01-21

## 2023-01-21 RX ORDER — ACETAMINOPHEN 325 MG/1
650 TABLET ORAL EVERY 6 HOURS PRN
Status: DISCONTINUED | OUTPATIENT
Start: 2023-01-21 | End: 2023-01-22

## 2023-01-21 RX ORDER — ONDANSETRON 4 MG/1
4 TABLET, ORALLY DISINTEGRATING ORAL EVERY 4 HOURS PRN
Status: DISCONTINUED | OUTPATIENT
Start: 2023-01-21 | End: 2023-01-21

## 2023-01-21 RX ORDER — PAROXETINE HYDROCHLORIDE 20 MG/1
30 TABLET, FILM COATED ORAL EVERY EVENING
Status: DISCONTINUED | OUTPATIENT
Start: 2023-01-21 | End: 2023-01-21

## 2023-01-21 RX ORDER — CHOLECALCIFEROL (VITAMIN D3) 125 MCG
1000 CAPSULE ORAL 2 TIMES DAILY
Status: DISCONTINUED | OUTPATIENT
Start: 2023-01-21 | End: 2023-01-22

## 2023-01-21 RX ORDER — QUETIAPINE FUMARATE 100 MG/1
100 TABLET, FILM COATED ORAL EVERY EVENING
Status: DISCONTINUED | OUTPATIENT
Start: 2023-01-21 | End: 2023-01-21

## 2023-01-21 RX ORDER — POTASSIUM CHLORIDE 29.8 MG/ML
40 INJECTION INTRAVENOUS ONCE
Status: COMPLETED | OUTPATIENT
Start: 2023-01-21 | End: 2023-01-21

## 2023-01-21 RX ORDER — POTASSIUM CHLORIDE 7.45 MG/ML
10 INJECTION INTRAVENOUS
Status: DISCONTINUED | OUTPATIENT
Start: 2023-01-21 | End: 2023-01-21

## 2023-01-21 RX ORDER — QUETIAPINE FUMARATE 25 MG/1
25 TABLET, FILM COATED ORAL 2 TIMES DAILY PRN
Status: DISCONTINUED | OUTPATIENT
Start: 2023-01-21 | End: 2023-01-22

## 2023-01-21 RX ORDER — LANOLIN ALCOHOL/MO/W.PET/CERES
400 CREAM (GRAM) TOPICAL DAILY
Status: DISCONTINUED | OUTPATIENT
Start: 2023-01-22 | End: 2023-01-21

## 2023-01-21 RX ORDER — ACETAMINOPHEN 325 MG/1
650 TABLET ORAL EVERY 6 HOURS PRN
Status: DISCONTINUED | OUTPATIENT
Start: 2023-01-21 | End: 2023-01-21

## 2023-01-21 RX ORDER — OXYCODONE HYDROCHLORIDE 5 MG/1
5 TABLET ORAL
Status: DISCONTINUED | OUTPATIENT
Start: 2023-01-21 | End: 2023-01-21

## 2023-01-21 RX ORDER — MIDODRINE HYDROCHLORIDE 5 MG/1
10 TABLET ORAL
Status: DISCONTINUED | OUTPATIENT
Start: 2023-01-21 | End: 2023-01-21

## 2023-01-21 RX ORDER — PROMETHAZINE HYDROCHLORIDE 25 MG/1
12.5-25 TABLET ORAL EVERY 4 HOURS PRN
Status: DISCONTINUED | OUTPATIENT
Start: 2023-01-21 | End: 2023-01-22

## 2023-01-21 RX ORDER — MORPHINE SULFATE 4 MG/ML
2 INJECTION INTRAVENOUS EVERY 6 HOURS PRN
Status: DISCONTINUED | OUTPATIENT
Start: 2023-01-21 | End: 2023-01-22

## 2023-01-21 RX ORDER — POTASSIUM CHLORIDE 20 MEQ/1
40 TABLET, EXTENDED RELEASE ORAL 3 TIMES DAILY
Status: COMPLETED | OUTPATIENT
Start: 2023-01-21 | End: 2023-01-21

## 2023-01-21 RX ORDER — QUETIAPINE FUMARATE 100 MG/1
100 TABLET, FILM COATED ORAL EVERY EVENING
Status: DISCONTINUED | OUTPATIENT
Start: 2023-01-21 | End: 2023-01-22

## 2023-01-21 RX ORDER — ONDANSETRON 4 MG/1
4 TABLET, ORALLY DISINTEGRATING ORAL EVERY 4 HOURS PRN
Status: DISCONTINUED | OUTPATIENT
Start: 2023-01-21 | End: 2023-01-22

## 2023-01-21 RX ORDER — MORPHINE SULFATE 4 MG/ML
2 INJECTION INTRAVENOUS EVERY 6 HOURS PRN
Status: DISCONTINUED | OUTPATIENT
Start: 2023-01-21 | End: 2023-01-21

## 2023-01-21 RX ORDER — SODIUM BICARBONATE 650 MG/1
1950 TABLET ORAL 3 TIMES DAILY
Status: DISCONTINUED | OUTPATIENT
Start: 2023-01-21 | End: 2023-01-21

## 2023-01-21 RX ORDER — AMOXICILLIN 250 MG
2 CAPSULE ORAL 2 TIMES DAILY
Status: DISCONTINUED | OUTPATIENT
Start: 2023-01-21 | End: 2023-01-22

## 2023-01-21 RX ORDER — POLYETHYLENE GLYCOL 3350 17 G/17G
1 POWDER, FOR SOLUTION ORAL
Status: DISCONTINUED | OUTPATIENT
Start: 2023-01-21 | End: 2023-01-21

## 2023-01-21 RX ORDER — POLYETHYLENE GLYCOL 3350 17 G/17G
1 POWDER, FOR SOLUTION ORAL
Status: DISCONTINUED | OUTPATIENT
Start: 2023-01-21 | End: 2023-01-22

## 2023-01-21 RX ORDER — LORAZEPAM 2 MG/ML
1 INJECTION INTRAMUSCULAR
Status: COMPLETED | OUTPATIENT
Start: 2023-01-21 | End: 2023-01-22

## 2023-01-21 RX ORDER — OXYCODONE HYDROCHLORIDE 5 MG/1
5 TABLET ORAL
Status: DISCONTINUED | OUTPATIENT
Start: 2023-01-21 | End: 2023-01-22

## 2023-01-21 RX ORDER — CALCIUM CARBONATE 500 MG/1
500 TABLET, CHEWABLE ORAL 3 TIMES DAILY
Status: DISCONTINUED | OUTPATIENT
Start: 2023-01-21 | End: 2023-01-21

## 2023-01-21 RX ORDER — MIDODRINE HYDROCHLORIDE 5 MG/1
10 TABLET ORAL
Status: DISCONTINUED | OUTPATIENT
Start: 2023-01-21 | End: 2023-01-22

## 2023-01-21 RX ORDER — BISACODYL 10 MG
10 SUPPOSITORY, RECTAL RECTAL
Status: DISCONTINUED | OUTPATIENT
Start: 2023-01-21 | End: 2023-01-22

## 2023-01-21 RX ORDER — QUETIAPINE FUMARATE 25 MG/1
25 TABLET, FILM COATED ORAL 2 TIMES DAILY PRN
Status: DISCONTINUED | OUTPATIENT
Start: 2023-01-21 | End: 2023-01-21

## 2023-01-21 RX ORDER — SODIUM BICARBONATE 650 MG/1
1950 TABLET ORAL 3 TIMES DAILY
Status: DISCONTINUED | OUTPATIENT
Start: 2023-01-21 | End: 2023-01-22

## 2023-01-21 RX ORDER — METRONIDAZOLE 500 MG/1
500 TABLET ORAL EVERY 8 HOURS
Status: DISCONTINUED | OUTPATIENT
Start: 2023-01-21 | End: 2023-01-23

## 2023-01-21 RX ADMIN — MIDODRINE HYDROCHLORIDE 10 MG: 5 TABLET ORAL at 12:15

## 2023-01-21 RX ADMIN — METRONIDAZOLE 500 MG: 500 TABLET ORAL at 05:40

## 2023-01-21 RX ADMIN — DIBASIC SODIUM PHOSPHATE, MONOBASIC POTASSIUM PHOSPHATE AND MONOBASIC SODIUM PHOSPHATE 250 MG: 852; 155; 130 TABLET ORAL at 22:28

## 2023-01-21 RX ADMIN — CYANOCOBALAMIN TAB 500 MCG 1000 MCG: 500 TAB at 05:40

## 2023-01-21 RX ADMIN — FOLIC ACID 1 MG: 1 TABLET ORAL at 18:52

## 2023-01-21 RX ADMIN — OXYCODONE HYDROCHLORIDE 5 MG: 5 TABLET ORAL at 14:58

## 2023-01-21 RX ADMIN — POTASSIUM CHLORIDE 40 MEQ: 1500 TABLET, EXTENDED RELEASE ORAL at 11:49

## 2023-01-21 RX ADMIN — MIDODRINE HYDROCHLORIDE 10 MG: 5 TABLET ORAL at 11:49

## 2023-01-21 RX ADMIN — Medication 400 MG: at 05:40

## 2023-01-21 RX ADMIN — SODIUM BICARBONATE 1950 MG: 650 TABLET ORAL at 11:50

## 2023-01-21 RX ADMIN — POTASSIUM CHLORIDE 40 MEQ: 1500 TABLET, EXTENDED RELEASE ORAL at 05:40

## 2023-01-21 RX ADMIN — OXYCODONE HYDROCHLORIDE 5 MG: 5 TABLET ORAL at 01:30

## 2023-01-21 RX ADMIN — OXYCODONE HYDROCHLORIDE 5 MG: 5 TABLET ORAL at 23:14

## 2023-01-21 RX ADMIN — CYANOCOBALAMIN TAB 500 MCG 1000 MCG: 500 TAB at 18:51

## 2023-01-21 RX ADMIN — POTASSIUM CHLORIDE 40 MEQ: 29.8 INJECTION, SOLUTION INTRAVENOUS at 03:52

## 2023-01-21 RX ADMIN — THIAMINE HYDROCHLORIDE 500 MG: 100 INJECTION, SOLUTION INTRAMUSCULAR; INTRAVENOUS at 05:55

## 2023-01-21 RX ADMIN — QUETIAPINE FUMARATE 100 MG: 100 TABLET ORAL at 18:50

## 2023-01-21 RX ADMIN — SODIUM BICARBONATE 1950 MG: 650 TABLET ORAL at 05:40

## 2023-01-21 RX ADMIN — OMEPRAZOLE 40 MG: KIT at 05:41

## 2023-01-21 RX ADMIN — SODIUM BICARBONATE: 84 INJECTION, SOLUTION INTRAVENOUS at 02:25

## 2023-01-21 RX ADMIN — OXYCODONE HYDROCHLORIDE 5 MG: 5 TABLET ORAL at 10:34

## 2023-01-21 RX ADMIN — OXYCODONE HYDROCHLORIDE 5 MG: 5 TABLET ORAL at 18:52

## 2023-01-21 RX ADMIN — MIDODRINE HYDROCHLORIDE 10 MG: 5 TABLET ORAL at 07:36

## 2023-01-21 RX ADMIN — DIBASIC SODIUM PHOSPHATE, MONOBASIC POTASSIUM PHOSPHATE AND MONOBASIC SODIUM PHOSPHATE 250 MG: 852; 155; 130 TABLET ORAL at 11:50

## 2023-01-21 RX ADMIN — METRONIDAZOLE 500 MG: 500 TABLET ORAL at 22:28

## 2023-01-21 RX ADMIN — THIAMINE HYDROCHLORIDE 500 MG: 100 INJECTION, SOLUTION INTRAMUSCULAR; INTRAVENOUS at 12:15

## 2023-01-21 RX ADMIN — METRONIDAZOLE 500 MG: 500 TABLET ORAL at 14:58

## 2023-01-21 RX ADMIN — OXYCODONE HYDROCHLORIDE 5 MG: 5 TABLET ORAL at 05:45

## 2023-01-21 RX ADMIN — FOLIC ACID 1 MG: 1 TABLET ORAL at 05:40

## 2023-01-21 RX ADMIN — DIBASIC SODIUM PHOSPHATE, MONOBASIC POTASSIUM PHOSPHATE AND MONOBASIC SODIUM PHOSPHATE 250 MG: 852; 155; 130 TABLET ORAL at 18:56

## 2023-01-21 RX ADMIN — CEFTRIAXONE SODIUM 1000 MG: 10 INJECTION, POWDER, FOR SOLUTION INTRAVENOUS at 22:28

## 2023-01-21 RX ADMIN — PAROXETINE HYDROCHLORIDE 30 MG: 20 TABLET, FILM COATED ORAL at 18:50

## 2023-01-21 RX ADMIN — SODIUM BICARBONATE 1950 MG: 650 TABLET ORAL at 18:51

## 2023-01-21 RX ADMIN — MIDODRINE HYDROCHLORIDE 10 MG: 5 TABLET ORAL at 18:50

## 2023-01-21 ASSESSMENT — ENCOUNTER SYMPTOMS
FEVER: 0
EYE PAIN: 0
ORTHOPNEA: 0
COUGH: 0
FOCAL WEAKNESS: 0
NECK PAIN: 0
CONSTIPATION: 0
VOMITING: 0
HEADACHES: 0
SPEECH CHANGE: 0
HALLUCINATIONS: 0
TREMORS: 0
ABDOMINAL PAIN: 1
WEAKNESS: 1
BLURRED VISION: 0
SPUTUM PRODUCTION: 0
DOUBLE VISION: 0
TINGLING: 0
MYALGIAS: 1
DIARRHEA: 0
DIZZINESS: 0
PHOTOPHOBIA: 0
WEIGHT LOSS: 0
SHORTNESS OF BREATH: 0
PALPITATIONS: 0
NAUSEA: 1
SENSORY CHANGE: 0
CHILLS: 0
BACK PAIN: 0

## 2023-01-21 ASSESSMENT — PAIN DESCRIPTION - PAIN TYPE
TYPE: ACUTE PAIN
TYPE: CHRONIC PAIN
TYPE: ACUTE PAIN
TYPE: CHRONIC PAIN
TYPE: ACUTE PAIN

## 2023-01-21 ASSESSMENT — FIBROSIS 4 INDEX
FIB4 SCORE: 0.57
FIB4 SCORE: 0.62

## 2023-01-21 ASSESSMENT — PAIN SCALES - WONG BAKER: WONGBAKER_NUMERICALRESPONSE: HURTS JUST A LITTLE BIT

## 2023-01-21 ASSESSMENT — LIFESTYLE VARIABLES: SUBSTANCE_ABUSE: 0

## 2023-01-21 NOTE — CARE PLAN
The patient is Watcher - Medium risk of patient condition declining or worsening    Shift Goals  Clinical Goals: VSS, monitor BP  Patient Goals: pain  Family Goals: N/A    Progress made toward(s) clinical / shift goals:    Problem: Knowledge Deficit - Standard  Goal: Patient and family/care givers will demonstrate understanding of plan of care, disease process/condition, diagnostic tests and medications  Outcome: Progressing     Problem: Pain - Standard  Goal: Alleviation of pain or a reduction in pain to the patient’s comfort goal  Outcome: Progressing     Problem: Fall Risk  Goal: Patient will remain free from falls  Outcome: Progressing

## 2023-01-21 NOTE — CARE PLAN
The patient is Watcher - Medium risk of patient condition declining or worsening    Shift Goals  Clinical Goals: wean levo, increase nutriton, pain control  Patient Goals: pain control  Family Goals: NAIN    Progress made toward(s) clinical / shift goals:      Problem: Knowledge Deficit - Standard  Goal: Patient and family/care givers will demonstrate understanding of plan of care, disease process/condition, diagnostic tests and medications  Description: Target End Date:  1-3 days or as soon as patient condition allows    Document in Patient Education    1.  Patient and family/caregiver oriented to unit, equipment, visitation policy and means for communicating concern  2.  Complete/review Learning Assessment  3.  Assess knowledge level of disease process/condition, treatment plan, diagnostic tests and medications  4.  Explain disease process/condition, treatment plan, diagnostic tests and medications  Outcome: Progressing     Problem: Pain - Standard  Goal: Alleviation of pain or a reduction in pain to the patient’s comfort goal  Description: Target End Date:  Prior to discharge or change in level of care    Document on Vitals flowsheet    1.  Document pain using the appropriate pain scale per order or unit policy  2.  Educate and implement non-pharmacologic comfort measures (i.e. relaxation, distraction, massage, cold/heat therapy, etc.)  3.  Pain management medications as ordered  4.  Reassess pain after pain med administration per policy  5.  If opiods administered assess patient's response to pain medication is appropriate per POSS sedation scale  6.  Follow pain management plan developed in collaboration with patient and interdisciplinary team (including palliative care or pain specialists if applicable)  Outcome: Progressing       Patient is not progressing towards the following goals:

## 2023-01-21 NOTE — CARE PLAN
The patient is Watcher - Medium risk of patient condition declining or worsening    Shift Goals  Clinical Goals: Hemodynamic stability, pain control, cortrak placement  Patient Goals: Get cortrak, feeding, home  Family Goals: NAIN    Progress made toward(s) clinical / shift goals:    Problem: Knowledge Deficit - Standard  Goal: Patient and family/care givers will demonstrate understanding of plan of care, disease process/condition, diagnostic tests and medications  Outcome: Progressing     Problem: Pain - Standard  Goal: Alleviation of pain or a reduction in pain to the patient’s comfort goal  Outcome: Progressing     Problem: Skin Integrity  Goal: Skin integrity is maintained or improved  Outcome: Progressing

## 2023-01-21 NOTE — PROGRESS NOTES
Progress note:     Pt able to consume 40-50% of lunch - no feeding tube at this time, as patient would like to try to eat PO foods again, prior to tube insertion.

## 2023-01-21 NOTE — PROGRESS NOTES
[facility] Medicine Daily Progress Note    Date of Service  1/21/2023    Chief Complaint  Mary Martinez is a 52 y.o. female admitted 1/19/2023 with abdominal pain and diarrhea    Hospital Course    Mary Martinez is a 52 y.o. female who presented 1/19/2023 with past medical history of rheumatoid arthritis, SMA syndrome, chronic abdominal pain, history of duodenal perforation in 2019, history of cholecystectomy presented to the hospital for abdominal pain.  Patient does have chronic epigastric abdominal pain (present for the past 2 years.  Premedications and had a celiac block.  The past 2 weeks she is to have lower abdominal pain associated with nausea, vomiting, diarrhea.  Abdominal pain will be worse after eating food.  She would have a bowel movement 4 times a day of watery stool.  She denies any fever, chills, dysuria.  Patient states that she recently lost her insurance and was unable to take home medications including rheumatoid arthritis patient or pain medications.  Her last real meal was 2 weeks ago.  She has been tolerating liquids.    GI consulted and they recommended MRCP to evaluate it further.  I discussed plan of care with patient and ordered cortrak.    Interval Problem Update    01/21/23    I evaluated and examined her at the bedside  She reported that she is feeling better and she consumed less than 50% of hardener but  I had a lengthy discussion with her regarding placement of feeding tube and after discussion plan is to place Cortrak.  Later patient declined NG tube feeding and requested to place PEG tube so she can receive PEG tube and used to feed at home and can be discharged home in few days as she has kids at home.  I reconsulted with GI regarding possible placement of PEG tube.  I discussed plan of care with nutritionist.  She is still requiring small amount of Levophed to keep up her blood pressure.  Plan is to titrate down her Levophed and transfer her to telemetry floor.  I  discussed plan of care with patient and answered all her questions.  Continue to monitor electrolytes and replace as needed.      Addendum: GI recommended to place IR guided feeding tube in jejunum.  I placed IR consult.    Addendum: I discussed with IR Dr. Edwards who recommended that IR did not place feeding tube ingestional and the recommended surgical consultation.  I requested consult with general surgery and discuss it with Dr. Dumont.    I have discussed this patient's plan of care and discharge plan at IDT rounds today with Case Management, Nursing, Nursing leadership, and other members of the IDT team.    Consultants/Specialty  GI    Code Status  Full Code    Disposition  Patient is not medically cleared for discharge.   Anticipate discharge to to home with close outpatient follow-up.  I have placed the appropriate orders for post-discharge needs.    Review of Systems  Review of Systems   Constitutional:  Positive for malaise/fatigue. Negative for chills, fever and weight loss.   HENT:  Negative for hearing loss and tinnitus.    Eyes:  Negative for blurred vision, double vision, photophobia and pain.   Respiratory:  Negative for cough, sputum production and shortness of breath.    Cardiovascular:  Negative for chest pain, palpitations, orthopnea and leg swelling.   Gastrointestinal:  Positive for abdominal pain and nausea. Negative for constipation, diarrhea and vomiting.   Genitourinary:  Negative for dysuria, frequency and urgency.   Musculoskeletal:  Positive for joint pain and myalgias. Negative for back pain and neck pain.   Skin:  Negative for rash.   Neurological:  Positive for weakness. Negative for dizziness, tingling, tremors, sensory change, speech change, focal weakness and headaches.   Psychiatric/Behavioral:  Negative for hallucinations and substance abuse.    All other systems reviewed and are negative.     Physical Exam  Temp:  [37.4 °C (99.3 °F)] 37.4 °C (99.3 °F)  Pulse:  []  78  Resp:  [10-38] 12  BP: ()/(50-68) 93/54  SpO2:  [94 %-99 %] 96 %    Physical Exam  Vitals reviewed.   Constitutional:       General: She is not in acute distress.     Appearance: Normal appearance. She is ill-appearing.      Comments: Clinically sh looks more alert this morning.   HENT:      Head: Normocephalic and atraumatic.      Nose: No congestion.   Eyes:      General:         Right eye: No discharge.         Left eye: No discharge.      Pupils: Pupils are equal, round, and reactive to light.   Cardiovascular:      Rate and Rhythm: Normal rate and regular rhythm.      Pulses: Normal pulses.      Heart sounds: Normal heart sounds. No murmur heard.  Pulmonary:      Effort: Pulmonary effort is normal. No respiratory distress.      Breath sounds: Normal breath sounds. No stridor.   Abdominal:      General: Bowel sounds are normal. There is no distension.      Palpations: Abdomen is soft.      Tenderness: There is abdominal tenderness (Mild).   Musculoskeletal:         General: No swelling or tenderness. Normal range of motion.      Cervical back: Normal range of motion. No rigidity.      Comments: Missing finger and hands.   Skin:     General: Skin is warm.      Capillary Refill: Capillary refill takes less than 2 seconds.      Coloration: Skin is not jaundiced or pale.      Findings: No bruising.   Neurological:      General: No focal deficit present.      Mental Status: She is alert and oriented to person, place, and time.      Cranial Nerves: No cranial nerve deficit.   Psychiatric:         Mood and Affect: Mood normal.         Behavior: Behavior normal.       Fluids    Intake/Output Summary (Last 24 hours) at 1/21/2023 1048  Last data filed at 1/21/2023 0703  Gross per 24 hour   Intake 1904.56 ml   Output 600 ml   Net 1304.56 ml         Laboratory  Recent Labs     01/19/23  2255 01/20/23  0341 01/20/23  1100 01/21/23  0246   WBC 13.1* 14.1*  --  8.2   RBC 2.77* 2.71*  --  2.54*   HEMOGLOBIN 9.0* 8.7*  8.9* 8.4*   HEMATOCRIT 28.2* 27.2* 26.4* 24.8*   .8* 100.4*  --  97.6   MCH 32.5 32.1  --  33.1*   MCHC 31.9* 32.0*  --  33.9   RDW 59.9* 59.6*  --  57.9*   PLATELETCT 249 249  --  268   MPV 10.8 10.6  --  10.5       Recent Labs     01/20/23  0341 01/20/23  1100 01/21/23  0246   SODIUM 137 140 137   POTASSIUM 3.4* 2.9* 3.3*   CHLORIDE 115* 113* 109   CO2 10* 15* 18*   GLUCOSE 101* 107* 109*   BUN 10 9 9   CREATININE 0.95 0.88 0.89   CALCIUM 7.3* 7.7* 7.8*                     Imaging  EC-ECHOCARDIOGRAM COMPLETE W/O CONT   Final Result      IR-PICC LINE PLACEMENT W/ GUIDANCE > AGE 5   Final Result                  Ultrasound-guided PICC placement performed by qualified nursing staff as    above.          CT-CTA COMPLETE THORACOABDOMINAL AORTA   Final Result      1.  No evidence for aortic aneurysm or dissection.   2.  No acute cardiopulmonary disease.   3.  Biliary dilation, increased from prior exam concerning for distal stricture, stone or mass.   4.  Prior cholecystectomy.   5.  Bilateral nonobstructing kidney stones.   6.  Mildly enlarged bilateral external iliac and inguinal lymph nodes, of uncertain etiology.                        [id number]    (Results Pending)          Assessment/Plan  * Starvation ketoacidosis- (present on admission)  Assessment & Plan  Related to her underlying SMA syndrome which causes abdominal pain after eating food  cont thiamine  Monitor for refeeding syndrome-monitor potassium and phosphorus closely  Dietary consult  Bicarb signficantly improved and I discontinued bicarb gtt.  I discuss    Bipolar 1 disorder (HCC)  Assessment & Plan  Restart paroxetine and Seroquel    Diarrhea  Assessment & Plan  Obtain stool cultures and C. difficile    Protein-calorie malnutrition, severe (HCC)- (present on admission)  Assessment & Plan  Supplements of been ordered  Check B12, vitamin D, iron    Hypotension- (present on admission)  Assessment & Plan  Patient found to have significant  hypotension.   Plan is to continue levophed with target MAP of 65.  Continue to monitor her in IMCU while is on pressor support.     Cachexia (HCC)- (present on admission)  Assessment & Plan  Dietary consult    SMAS (superior mesenteric artery syndrome) (HCC)- (present on admission)  Assessment & Plan  CTA was normal  Pain control with oral and IV narcotics  I had a lengthy discussion with her regarding placement of feeding tube and after discussion plan is to place Cortrak.  Later patient declined NG tube feeding and requested to place PEG tube so she can receive PEG tube and used to feed at home and can be discharged home in few days as she has kids at home.  I reconsulted with GI regarding possible placement of PEG tube.  I ordered MRCP and currently its pending.    Hypokalemia- (present on admission)  Assessment & Plan  Monitor closely for refeeding syndrome    High anion gap metabolic acidosis- (present on admission)  Assessment & Plan  Denies ingesting abnormal substance  Likely due to starvation ketoacidosis    Arthritis, rheumatoid (HCC)- (present on admission)  Assessment & Plan  Check ESR  Will need to restart her home medications after discharge    Leukocytosis- (present on admission)  Assessment & Plan  Pro-Vishnu is elevated  Start Flagyl and ceftriaxone      I discussed plan of care during multidisciplinary rounds regarding patient's current medical condition and plan of care.    VTE prophylaxis: SCDs/TEDs    Holding pharmacological DVT prophylaxis due to significant anemia and hemoglobin is trending down.    I have performed a physical exam and reviewed and updated ROS and Plan today (1/21/2023). In review of yesterday's note (1/20/2023), there are no changes except as documented above.

## 2023-01-21 NOTE — CONSULTS
DATE OF CONSULTATION:  1/21/2023     REFERRING PHYSICIAN:   Albaro Sutherland M.D.    CONSULTING PHYSICIAN:  James Dumont M.D.     REASON FOR CONSULTATION:  I have been asked by Dr.tMuhammad David Sutherland M.D.o see the patient in surgical consultation for evaluation of feeding access    HISTORY OF PRESENT ILLNESS: Mary Martinez is a very pleasant 52-year-old female receiving care medicine service chronic abdominal pain.  Seen by the GI service as above recommendation tube feeding.  She reports no difficulty swallowing.  She states she has abdominal pain diarrhea after eating.  Diarrhea improved with fasting . ongoing evaluation for cause of abdominal pain.  Imaging pending.  Discussed with her surgical placement of feeding tube.  Discussed risks to include persistence of symptoms.  She stated she does not want surgery.  She is requesting percutaneous gastrostomy tube placement.  She reports she was previously scheduled for a PEG but declined due to insurance reasons.  Discussed with her high risk of complications for surgery due to malnourishment and recommendation for tube feedings if tolerated.  She declined she states she does not want surgery she just wants to go home.  Discussed ongoing evaluation.  She states she is willing to stay for ongoing evaluation    PAST MEDICAL HISTORY:  has a past medical history of Acute renal failure (ARF) (HCC), Allergy, Anemia, Anxiety, Arthritis, ASTHMA, Blood transfusion without reported diagnosis, Bowel habit changes, Bronchitis (last approx 2018), Chronic pain (04/24/2020), Dental disorder, Depression, GERD (gastroesophageal reflux disease), GIB (gastrointestinal bleeding), Head ache, Heart burn, Hiatus hernia syndrome, History of pancreatitis, Hypokalemia, Hyponatremia, Idiopathic chronic pancreatitis (HCC), Indigestion, Intractable nausea and vomiting, Kidney disease, Pain, Pneumonia (10/2019), PONV (postoperative nausea and vomiting), Psychiatric  problem, Rheumatoid aortitis, Rheumatoid arthritis(714.0) (2003), SMAS (superior mesenteric artery syndrome) (HCC), and Substance abuse (HCC).    She has no past medical history of Addisons disease (HCC), Adrenal disorder (HCC), Arrhythmia, BRCA1 negative, BRCA1 positive, BRCA2 gene mutation positive, BRCA2 negative, Breast cancer (HCC), Cancer (HCC), Cancer of peritoneum (HCC), Cataract, CHF (congestive heart failure) (HCC), Clotting disorder (HCC), COPD (chronic obstructive pulmonary disease) (HCC), Cushings syndrome (HCC), Diabetes (HCC), Diabetic neuropathy (HCC), Glaucoma, Goiter, Heart attack (HCC), Heart murmur, HIV (human immunodeficiency virus infection) (HCC), Hyperlipidemia, Hypertension, IBD (inflammatory bowel disease), Malignant neoplasm of fallopian tube (HCC), Meningitis, Migraine, Muscle disorder, Osteoporosis, Ovarian cancer (HCC), Parathyroid disorder (HCC), Pituitary disease (HCC), Pulmonary emphysema (HCC), Seizure (HCC), Sickle cell disease (HCC), Stroke (HCC), Thyroid disease, Tuberculosis, or Urinary tract infection.    PAST SURGICAL HISTORY:  has a past surgical history that includes abdominal abscess drainage (9/27/2011); toe fusion (Right, 5/27/2015); bone spur excision (5/27/2015); hammertoe correction (5/27/2015); foot reconstruction rheumatic (Left, 7/29/2015); arthrodesis (Right, 5/6/2016); fusion, pip joint, toe (Right, 8/29/2016); bone graft (Right, 8/29/2016); irrigation & debridement ortho (Right, 9/11/2016); finger amputation (Right, 9/14/2016); finger arthroplasty (Right, 4/17/2017); finger arthroplasty (Right, 6/5/2017); finger amputation (6/5/2017); pr exploratory of abdomen (N/A, 10/4/2019); tonsillectomy (1982); primary c section (1991); repeat c section (1999); repeat c section (2005); repeat c section (2008); appendectomy (2011); nicholas by laparoscopy (9/27/2011); wrist exploration (Left, 1980's); colonoscopy (2018); dental extraction(s) (2014); pr endoscopic us exam, esoph  (4/29/2020); gastroscopy-endo (4/29/2020); egd with asp/bx (4/29/2020); pr upper gi endoscopy,diagnosis (N/A, 9/9/2022); and egd w/endoscopic ultrasound (N/A, 9/9/2022).    ALLERGIES:   Allergies   Allergen Reactions    Penicillins Anaphylaxis and Hives     Tolerates cephalosporins; reports throat swelling with PCN    Abilify Unspecified     Multiple side effects    Aripiprazole Nausea     Spasms, shaking      Nitrous Oxide Vomiting       CURRENT MEDICATIONS:    Home Medications       Reviewed by Arnulfo Blackburn (Pharmacy Tech) on 01/19/23 at 1727  Med List Status: Complete     Medication Last Dose Status   ibuprofen (MOTRIN) 200 MG Tab 1/19/2023 Active   sucralfate (CARAFATE) 1 GM Tab 1/19/2023 Active                    FAMILY HISTORY: family history includes Cancer in her mother; Heart Disease in her father and mother; Hypertension in her father and mother.    SOCIAL HISTORY:  reports that she has been smoking cigarettes. She has a 30.00 pack-year smoking history. She has never used smokeless tobacco. She reports that she does not drink alcohol and does not use drugs.      PHYSICAL EXAMINATION:    Physical Exam  Awake alert and interactive  Tolerating popsicle  Breathing with ease no distress  LABORATORY VALUES:   Recent Labs     01/19/23  2255 01/20/23  0341 01/20/23  1100 01/21/23  0246   WBC 13.1* 14.1*  --  8.2   RBC 2.77* 2.71*  --  2.54*   HEMOGLOBIN 9.0* 8.7* 8.9* 8.4*   HEMATOCRIT 28.2* 27.2* 26.4* 24.8*   .8* 100.4*  --  97.6   MCH 32.5 32.1  --  33.1*   MCHC 31.9* 32.0*  --  33.9   RDW 59.9* 59.6*  --  57.9*   PLATELETCT 249 249  --  268   MPV 10.8 10.6  --  10.5     Recent Labs     01/20/23  0341 01/20/23  1100 01/21/23  0246   SODIUM 137 140 137   POTASSIUM 3.4* 2.9* 3.3*   CHLORIDE 115* 113* 109   CO2 10* 15* 18*   GLUCOSE 101* 107* 109*   BUN 10 9 9   CREATININE 0.95 0.88 0.89   CALCIUM 7.3* 7.7* 7.8*     Recent Labs     01/19/23  2255 01/20/23  0341 01/21/23  0246   ASTSGOT 5* 6* 7*    ALTSGPT <5 <5 <5   TBILIRUBIN <0.2 <0.2 <0.2   ALKPHOSPHAT 98 96 83   GLOBULIN 2.5 2.5 2.4   AMMONIA 19  --   --             IMAGING:   EC-ECHOCARDIOGRAM COMPLETE W/O CONT   Final Result      IR-PICC LINE PLACEMENT W/ GUIDANCE > AGE 5   Final Result                  Ultrasound-guided PICC placement performed by qualified nursing staff as    above.          CT-CTA COMPLETE THORACOABDOMINAL AORTA   Final Result      1.  No evidence for aortic aneurysm or dissection.   2.  No acute cardiopulmonary disease.   3.  Biliary dilation, increased from prior exam concerning for distal stricture, stone or mass.   4.  Prior cholecystectomy.   5.  Bilateral nonobstructing kidney stones.   6.  Mildly enlarged bilateral external iliac and inguinal lymph nodes, of uncertain etiology.                        [id number]/O    (Results Pending)       ASSESSMENT AND PLAN:     Chronic abdominal pain  No difficulty swallowing per report pain follows eating.  Discussed with patient unknown cause may not be affected by route of feeding  Surgery consultation for feeding access  Patient states she does not wish to have surgery.  She desires PEG with GI  Ongoing evaluation for abdominal pain prior to intervention  Therapy as directed by medicine service    Nutritional support with tube feeds if patient will except  MRI planned as above  Follow-up GI recommendations  Close monitoring and support         ____________________________________     James Dumont M.D.    DD: 1/21/2023  2:47 PM    ACS NSQIP Surgical Risk Calculator

## 2023-01-21 NOTE — DIETARY
Nutrition Services: Day 2 of admit.  Mary Martinez is a 52 y.o. female with admitting DX of     Consult received for TF. Per D/w MD, pt has agreed to nasal feeding tube for supplemental nutrition.     Estimated needs (per RD assessment 1/20):   30-35 kcal/kg = 3100-5455 kcal/day  1.5-2.0 grams protein/kg = 74-98 grams protein/day    Plan to meet ~50% of pt's estimated needs via nocturnal TF. Will continue regular diet with nourishments during the day.     Will utilize low-volume, peptide-based, fiber-free formula to promote GI tolerance.     Recommend: Impact Peptide 1.5 @ 50 ml/hr x10 hours overnight ([id number]) to provide 750 kcal, 47 grams protein, and 385 ml free water per day.    RD following.

## 2023-01-22 ENCOUNTER — APPOINTMENT (OUTPATIENT)
Dept: RADIOLOGY | Facility: MEDICAL CENTER | Age: 53
DRG: 393 | End: 2023-01-22
Attending: INTERNAL MEDICINE
Payer: MEDICAID

## 2023-01-22 LAB
ALBUMIN SERPL BCP-MCNC: 2.7 G/DL (ref 3.2–4.9)
ALBUMIN/GLOB SERPL: 1.1 G/DL
ALP SERPL-CCNC: 75 U/L (ref 30–99)
ALT SERPL-CCNC: <5 U/L (ref 2–50)
ANION GAP SERPL CALC-SCNC: 8 MMOL/L (ref 7–16)
AST SERPL-CCNC: 7 U/L (ref 12–45)
BILIRUB SERPL-MCNC: 0.2 MG/DL (ref 0.1–1.5)
BUN SERPL-MCNC: 6 MG/DL (ref 8–22)
CALCIUM ALBUM COR SERPL-MCNC: 8.9 MG/DL (ref 8.5–10.5)
CALCIUM SERPL-MCNC: 7.9 MG/DL (ref 8.5–10.5)
CHLORIDE SERPL-SCNC: 106 MMOL/L (ref 96–112)
CO2 SERPL-SCNC: 27 MMOL/L (ref 20–33)
CREAT SERPL-MCNC: 0.7 MG/DL (ref 0.5–1.4)
CRP SERPL HS-MCNC: 8.47 MG/DL (ref 0–0.75)
ERYTHROCYTE [DISTWIDTH] IN BLOOD BY AUTOMATED COUNT: 57.2 FL (ref 35.9–50)
GFR SERPLBLD CREATININE-BSD FMLA CKD-EPI: 105 ML/MIN/1.73 M 2
GLOBULIN SER CALC-MCNC: 2.4 G/DL (ref 1.9–3.5)
GLUCOSE SERPL-MCNC: 87 MG/DL (ref 65–99)
HCT VFR BLD AUTO: 23.5 % (ref 37–47)
HGB BLD-MCNC: 7.9 G/DL (ref 12–16)
MAGNESIUM SERPL-MCNC: 1.7 MG/DL (ref 1.5–2.5)
MCH RBC QN AUTO: 32.8 PG (ref 27–33)
MCHC RBC AUTO-ENTMCNC: 33.6 G/DL (ref 33.6–35)
MCV RBC AUTO: 97.5 FL (ref 81.4–97.8)
PHOSPHATE SERPL-MCNC: 2.6 MG/DL (ref 2.5–4.5)
PLATELET # BLD AUTO: 265 K/UL (ref 164–446)
PMV BLD AUTO: 10.5 FL (ref 9–12.9)
POTASSIUM SERPL-SCNC: 4 MMOL/L (ref 3.6–5.5)
PREALB SERPL-MCNC: 10.2 MG/DL (ref 18–38)
PROT SERPL-MCNC: 5.1 G/DL (ref 6–8.2)
RBC # BLD AUTO: 2.41 M/UL (ref 4.2–5.4)
SODIUM SERPL-SCNC: 141 MMOL/L (ref 135–145)
WBC # BLD AUTO: 6.2 K/UL (ref 4.8–10.8)

## 2023-01-22 PROCEDURE — 80053 COMPREHEN METABOLIC PANEL: CPT

## 2023-01-22 PROCEDURE — 99231 SBSQ HOSP IP/OBS SF/LOW 25: CPT

## 2023-01-22 PROCEDURE — 84100 ASSAY OF PHOSPHORUS: CPT

## 2023-01-22 PROCEDURE — 770006 HCHG ROOM/CARE - MED/SURG/GYN SEMI*

## 2023-01-22 PROCEDURE — 700102 HCHG RX REV CODE 250 W/ 637 OVERRIDE(OP): Performed by: INTERNAL MEDICINE

## 2023-01-22 PROCEDURE — 74181 MRI ABDOMEN W/O CONTRAST: CPT

## 2023-01-22 PROCEDURE — 99232 SBSQ HOSP IP/OBS MODERATE 35: CPT | Mod: 25 | Performed by: INTERNAL MEDICINE

## 2023-01-22 PROCEDURE — A9270 NON-COVERED ITEM OR SERVICE: HCPCS | Performed by: INTERNAL MEDICINE

## 2023-01-22 PROCEDURE — 700111 HCHG RX REV CODE 636 W/ 250 OVERRIDE (IP): Performed by: INTERNAL MEDICINE

## 2023-01-22 PROCEDURE — 700105 HCHG RX REV CODE 258: Performed by: STUDENT IN AN ORGANIZED HEALTH CARE EDUCATION/TRAINING PROGRAM

## 2023-01-22 PROCEDURE — 83735 ASSAY OF MAGNESIUM: CPT

## 2023-01-22 PROCEDURE — 85027 COMPLETE CBC AUTOMATED: CPT

## 2023-01-22 PROCEDURE — 700111 HCHG RX REV CODE 636 W/ 250 OVERRIDE (IP): Performed by: HOSPITALIST

## 2023-01-22 PROCEDURE — 86140 C-REACTIVE PROTEIN: CPT

## 2023-01-22 PROCEDURE — 700111 HCHG RX REV CODE 636 W/ 250 OVERRIDE (IP): Performed by: STUDENT IN AN ORGANIZED HEALTH CARE EDUCATION/TRAINING PROGRAM

## 2023-01-22 PROCEDURE — 84134 ASSAY OF PREALBUMIN: CPT

## 2023-01-22 PROCEDURE — 99358 PROLONG SERVICE W/O CONTACT: CPT | Performed by: INTERNAL MEDICINE

## 2023-01-22 PROCEDURE — 99359 PROLONG SERV W/O CONTACT ADD: CPT | Performed by: INTERNAL MEDICINE

## 2023-01-22 RX ORDER — SODIUM BICARBONATE 650 MG/1
1950 TABLET ORAL 3 TIMES DAILY
Status: DISCONTINUED | OUTPATIENT
Start: 2023-01-22 | End: 2023-01-23

## 2023-01-22 RX ORDER — BISACODYL 10 MG
10 SUPPOSITORY, RECTAL RECTAL
Status: DISCONTINUED | OUTPATIENT
Start: 2023-01-22 | End: 2023-01-25 | Stop reason: HOSPADM

## 2023-01-22 RX ORDER — CHOLECALCIFEROL (VITAMIN D3) 125 MCG
1000 CAPSULE ORAL 2 TIMES DAILY
Status: DISCONTINUED | OUTPATIENT
Start: 2023-01-22 | End: 2023-01-25 | Stop reason: HOSPADM

## 2023-01-22 RX ORDER — QUETIAPINE FUMARATE 25 MG/1
25 TABLET, FILM COATED ORAL 2 TIMES DAILY PRN
Status: DISCONTINUED | OUTPATIENT
Start: 2023-01-22 | End: 2023-01-25 | Stop reason: HOSPADM

## 2023-01-22 RX ORDER — LANOLIN ALCOHOL/MO/W.PET/CERES
400 CREAM (GRAM) TOPICAL DAILY
Status: DISCONTINUED | OUTPATIENT
Start: 2023-01-23 | End: 2023-01-25 | Stop reason: HOSPADM

## 2023-01-22 RX ORDER — FOLIC ACID 1 MG/1
1 TABLET ORAL 2 TIMES DAILY
Status: DISCONTINUED | OUTPATIENT
Start: 2023-01-22 | End: 2023-01-25 | Stop reason: HOSPADM

## 2023-01-22 RX ORDER — AMOXICILLIN 250 MG
2 CAPSULE ORAL 2 TIMES DAILY
Status: DISCONTINUED | OUTPATIENT
Start: 2023-01-22 | End: 2023-01-25 | Stop reason: HOSPADM

## 2023-01-22 RX ORDER — POLYETHYLENE GLYCOL 3350 17 G/17G
1 POWDER, FOR SOLUTION ORAL
Status: DISCONTINUED | OUTPATIENT
Start: 2023-01-22 | End: 2023-01-25 | Stop reason: HOSPADM

## 2023-01-22 RX ORDER — PROMETHAZINE HYDROCHLORIDE 25 MG/1
12.5-25 TABLET ORAL EVERY 4 HOURS PRN
Status: DISCONTINUED | OUTPATIENT
Start: 2023-01-22 | End: 2023-01-25 | Stop reason: HOSPADM

## 2023-01-22 RX ORDER — OXYCODONE HYDROCHLORIDE 5 MG/1
5 TABLET ORAL
Status: DISCONTINUED | OUTPATIENT
Start: 2023-01-22 | End: 2023-01-25 | Stop reason: HOSPADM

## 2023-01-22 RX ORDER — QUETIAPINE FUMARATE 100 MG/1
100 TABLET, FILM COATED ORAL EVERY EVENING
Status: DISCONTINUED | OUTPATIENT
Start: 2023-01-22 | End: 2023-01-25 | Stop reason: HOSPADM

## 2023-01-22 RX ORDER — ONDANSETRON 4 MG/1
4 TABLET, ORALLY DISINTEGRATING ORAL EVERY 4 HOURS PRN
Status: DISCONTINUED | OUTPATIENT
Start: 2023-01-22 | End: 2023-01-25 | Stop reason: HOSPADM

## 2023-01-22 RX ORDER — ACETAMINOPHEN 325 MG/1
650 TABLET ORAL EVERY 6 HOURS PRN
Status: DISCONTINUED | OUTPATIENT
Start: 2023-01-22 | End: 2023-01-25 | Stop reason: HOSPADM

## 2023-01-22 RX ORDER — MIDODRINE HYDROCHLORIDE 5 MG/1
10 TABLET ORAL
Status: DISCONTINUED | OUTPATIENT
Start: 2023-01-22 | End: 2023-01-25 | Stop reason: HOSPADM

## 2023-01-22 RX ORDER — MORPHINE SULFATE 4 MG/ML
2 INJECTION INTRAVENOUS EVERY 6 HOURS PRN
Status: DISCONTINUED | OUTPATIENT
Start: 2023-01-22 | End: 2023-01-25 | Stop reason: HOSPADM

## 2023-01-22 RX ADMIN — DIBASIC SODIUM PHOSPHATE, MONOBASIC POTASSIUM PHOSPHATE AND MONOBASIC SODIUM PHOSPHATE 250 MG: 852; 155; 130 TABLET ORAL at 12:25

## 2023-01-22 RX ADMIN — OXYCODONE HYDROCHLORIDE 5 MG: 5 TABLET ORAL at 03:13

## 2023-01-22 RX ADMIN — OXYCODONE HYDROCHLORIDE 5 MG: 5 TABLET ORAL at 07:36

## 2023-01-22 RX ADMIN — SODIUM BICARBONATE 1950 MG: 650 TABLET ORAL at 05:09

## 2023-01-22 RX ADMIN — SODIUM BICARBONATE 1950 MG: 650 TABLET ORAL at 17:38

## 2023-01-22 RX ADMIN — DIBASIC SODIUM PHOSPHATE, MONOBASIC POTASSIUM PHOSPHATE AND MONOBASIC SODIUM PHOSPHATE 250 MG: 852; 155; 130 TABLET ORAL at 09:00

## 2023-01-22 RX ADMIN — METRONIDAZOLE 500 MG: 500 TABLET ORAL at 13:35

## 2023-01-22 RX ADMIN — OXYCODONE HYDROCHLORIDE 5 MG: 5 TABLET ORAL at 12:25

## 2023-01-22 RX ADMIN — THIAMINE HYDROCHLORIDE 500 MG: 100 INJECTION, SOLUTION INTRAMUSCULAR; INTRAVENOUS at 00:15

## 2023-01-22 RX ADMIN — MIDODRINE HYDROCHLORIDE 10 MG: 5 TABLET ORAL at 07:58

## 2023-01-22 RX ADMIN — CYANOCOBALAMIN TAB 500 MCG 1000 MCG: 500 TAB at 17:39

## 2023-01-22 RX ADMIN — MAGNESIUM GLUCONATE 500 MG ORAL TABLET 400 MG: 500 TABLET ORAL at 06:00

## 2023-01-22 RX ADMIN — LORAZEPAM 1 MG: 2 INJECTION INTRAMUSCULAR; INTRAVENOUS at 11:13

## 2023-01-22 RX ADMIN — THIAMINE HYDROCHLORIDE 500 MG: 100 INJECTION, SOLUTION INTRAMUSCULAR; INTRAVENOUS at 05:11

## 2023-01-22 RX ADMIN — SODIUM BICARBONATE 1950 MG: 650 TABLET ORAL at 12:25

## 2023-01-22 RX ADMIN — METRONIDAZOLE 500 MG: 500 TABLET ORAL at 05:11

## 2023-01-22 RX ADMIN — THIAMINE HYDROCHLORIDE 500 MG: 100 INJECTION, SOLUTION INTRAMUSCULAR; INTRAVENOUS at 17:54

## 2023-01-22 RX ADMIN — METRONIDAZOLE 500 MG: 500 TABLET ORAL at 21:42

## 2023-01-22 RX ADMIN — OXYCODONE HYDROCHLORIDE 5 MG: 5 TABLET ORAL at 21:46

## 2023-01-22 RX ADMIN — MIDODRINE HYDROCHLORIDE 10 MG: 5 TABLET ORAL at 11:08

## 2023-01-22 RX ADMIN — THIAMINE HYDROCHLORIDE 500 MG: 100 INJECTION, SOLUTION INTRAMUSCULAR; INTRAVENOUS at 12:26

## 2023-01-22 RX ADMIN — FOLIC ACID 1 MG: 1 TABLET ORAL at 06:00

## 2023-01-22 RX ADMIN — MIDODRINE HYDROCHLORIDE 10 MG: 5 TABLET ORAL at 17:39

## 2023-01-22 RX ADMIN — CEFTRIAXONE SODIUM 1000 MG: 10 INJECTION, POWDER, FOR SOLUTION INTRAVENOUS at 17:40

## 2023-01-22 RX ADMIN — PAROXETINE HYDROCHLORIDE 30 MG: 20 TABLET, FILM COATED ORAL at 17:38

## 2023-01-22 RX ADMIN — FOLIC ACID 1 MG: 1 TABLET ORAL at 17:39

## 2023-01-22 RX ADMIN — CYANOCOBALAMIN TAB 500 MCG 1000 MCG: 500 TAB at 05:09

## 2023-01-22 RX ADMIN — OXYCODONE HYDROCHLORIDE 5 MG: 5 TABLET ORAL at 17:39

## 2023-01-22 RX ADMIN — QUETIAPINE FUMARATE 100 MG: 100 TABLET ORAL at 17:38

## 2023-01-22 RX ADMIN — OMEPRAZOLE 40 MG: KIT at 05:23

## 2023-01-22 ASSESSMENT — ENCOUNTER SYMPTOMS
FOCAL WEAKNESS: 0
CHILLS: 0
NAUSEA: 1
SHORTNESS OF BREATH: 0
SENSORY CHANGE: 0
WEAKNESS: 1
DIZZINESS: 0
DOUBLE VISION: 0
NECK PAIN: 0
CONSTIPATION: 0
HALLUCINATIONS: 0
TINGLING: 0
DIARRHEA: 0
MYALGIAS: 1
HEADACHES: 0
SPEECH CHANGE: 0
PHOTOPHOBIA: 0
COUGH: 0
PALPITATIONS: 0
BACK PAIN: 0
ORTHOPNEA: 0
BLURRED VISION: 0
SPUTUM PRODUCTION: 0
VOMITING: 0
TREMORS: 0
FEVER: 0
EYE PAIN: 0
WEIGHT LOSS: 0
ABDOMINAL PAIN: 1

## 2023-01-22 ASSESSMENT — PAIN SCALES - PAIN ASSESSMENT IN ADVANCED DEMENTIA (PAINAD)
BODYLANGUAGE: RELAXED
BREATHING: NORMAL
BODYLANGUAGE: RELAXED

## 2023-01-22 ASSESSMENT — PAIN DESCRIPTION - PAIN TYPE: TYPE: CHRONIC PAIN

## 2023-01-22 ASSESSMENT — LIFESTYLE VARIABLES: SUBSTANCE_ABUSE: 0

## 2023-01-22 NOTE — PROGRESS NOTES
[facility] Medicine Daily Progress Note    Date of Service  1/22/2023    Chief Complaint  Mary Martinez is a 52 y.o. female admitted 1/19/2023 with abdominal pain and diarrhea    Hospital Course    Mary Martinez is a 52 y.o. female who presented 1/19/2023 with past medical history of rheumatoid arthritis, SMA syndrome, chronic abdominal pain, history of duodenal perforation in 2019, history of cholecystectomy presented to the hospital for abdominal pain.  Patient does have chronic epigastric abdominal pain (present for the past 2 years.  Premedications and had a celiac block.  The past 2 weeks she is to have lower abdominal pain associated with nausea, vomiting, diarrhea.  Abdominal pain will be worse after eating food.  She would have a bowel movement 4 times a day of watery stool.  She denies any fever, chills, dysuria.  Patient states that she recently lost her insurance and was unable to take home medications including rheumatoid arthritis patient or pain medications.  Her last real meal was 2 weeks ago.  She has been tolerating liquids.    GI consulted and they recommended MRCP to evaluate it further.  I discussed plan of care with patient and ordered cortrak.  01/21/23    I evaluated and examined her at the bedside  She reported that she is feeling better and she consumed less than 50% of hardener but  I had a lengthy discussion with her regarding placement of feeding tube and after discussion plan is to place Cortrak.  Later patient declined NG tube feeding and requested to place PEG tube so she can receive PEG tube and used to feed at home and can be discharged home in few days as she has kids at home.  I reconsulted with GI regarding possible placement of PEG tube.  I discussed plan of care with nutritionist.  She is still requiring small amount of Levophed to keep up her blood pressure.  Plan is to titrate down her Levophed and transfer her to telemetry floor.  I discussed plan of care with  patient and answered all her questions.  Continue to monitor electrolytes and replace as needed.      Addendum: GI recommended to place IR guided feeding tube in jejunum.  I placed IR consult.    Addendum: I discussed with IR Dr. Edwards who recommended that IR did not place feeding tube ingestional and the recommended surgical consultation.  I requested consult with general surgery and discuss it with Dr. Dumont.  Interval Problem Update  1/22. I spent time evaluating and discussing this patient with Dr. Hernandez, Dr. Dumont, patient herself, patient's son.  She has SMA syndrome and currently needing tubefeeding however she has been back and forth with wanting a PEG tube versus NG tube. She is also not wanting the NG tube because she was told she won;t be able to go home with it. I explained that she will need some some sort of feeding whether its a PEG< PEJ or NG tube to the stomach or duodenum. GI and IR had already been contacted and they had mentioned they cannot do a PEG or insert the NG tube to duodenum due to staffing or inability. I clarified with Dr. Dumont what her surgical options are and he prefers her to have a trial of tube feeding first. If she does not tolerate it then they will consider PEJ placement surgically. Location of NG tube does not matter even if it is SMA syndrome per Dr. Dumont . Patient agreeable with plan. I updated son.  Patient also complained of mild swelling.    1/22/2023  [id number]  [id number]      I have discussed this patient's plan of care and discharge plan at IDT rounds today with Case Management, Nursing, Nursing leadership, and other members of the IDT team.    Consultants/Specialty  GI  Surgery    Code Status  Full Code    Disposition  Patient is not medically cleared for discharge.   Anticipate discharge to to home with close outpatient follow-up.  I have placed the appropriate orders for post-discharge needs.    Review of Systems  Review of Systems   Constitutional:   Positive for malaise/fatigue. Negative for chills, fever and weight loss.   HENT:  Negative for hearing loss and tinnitus.    Eyes:  Negative for blurred vision, double vision, photophobia and pain.   Respiratory:  Negative for cough, sputum production and shortness of breath.    Cardiovascular:  Negative for chest pain, palpitations, orthopnea and leg swelling.   Gastrointestinal:  Positive for abdominal pain and nausea. Negative for constipation, diarrhea and vomiting.   Genitourinary:  Negative for dysuria, frequency and urgency.   Musculoskeletal:  Positive for joint pain and myalgias. Negative for back pain and neck pain.   Skin:  Negative for rash.   Neurological:  Positive for weakness. Negative for dizziness, tingling, tremors, sensory change, speech change, focal weakness and headaches.   Psychiatric/Behavioral:  Negative for hallucinations and substance abuse.    All other systems reviewed and are negative.     Physical Exam  Temp:  [36.4 °C (97.6 °F)-37.4 °C (99.3 °F)] 37.3 °C (99.1 °F)  Pulse:  [73-86] 73  Resp:  [18] 18  BP: (89-94)/(52-62) 94/54  SpO2:  [95 %-98 %] 95 %    Physical Exam  Vitals reviewed.   Constitutional:       General: She is not in acute distress.     Appearance: Normal appearance. She is ill-appearing.      Comments: Clinically sh looks more alert this morning.   HENT:      Head: Normocephalic and atraumatic.      Nose: No congestion.   Eyes:      General:         Right eye: No discharge.         Left eye: No discharge.      Pupils: Pupils are equal, round, and reactive to light.   Cardiovascular:      Rate and Rhythm: Normal rate and regular rhythm.      Pulses: Normal pulses.      Heart sounds: Normal heart sounds. No murmur heard.  Pulmonary:      Effort: Pulmonary effort is normal. No respiratory distress.      Breath sounds: Normal breath sounds. No stridor.   Abdominal:      General: Bowel sounds are normal. There is no distension.      Palpations: Abdomen is soft.       Tenderness: There is abdominal tenderness (Mild).   Genitourinary:     Comments: Mild anasarca  Musculoskeletal:         General: Deformity present. No swelling or tenderness. Normal range of motion.      Cervical back: Normal range of motion. No rigidity.      Right lower leg: Edema present.      Left lower leg: Edema present.      Comments: Missing finger and hands.   Skin:     General: Skin is warm.      Capillary Refill: Capillary refill takes less than 2 seconds.      Coloration: Skin is not jaundiced or pale.      Findings: No bruising.   Neurological:      General: No focal deficit present.      Mental Status: She is alert and oriented to person, place, and time.      Cranial Nerves: No cranial nerve deficit.   Psychiatric:         Mood and Affect: Mood normal.         Behavior: Behavior normal.       Fluids    Intake/Output Summary (Last 24 hours) at 1/22/2023 1507  Last data filed at 1/22/2023 1400  Gross per 24 hour   Intake 300 ml   Output --   Net 300 ml         Laboratory  Recent Labs     01/20/23  0341 01/20/23  1100 01/21/23  0246 01/22/23  0210   WBC 14.1*  --  8.2 6.2   RBC 2.71*  --  2.54* 2.41*   HEMOGLOBIN 8.7* 8.9* 8.4* 7.9*   HEMATOCRIT 27.2* 26.4* 24.8* 23.5*   .4*  --  97.6 97.5   MCH 32.1  --  33.1* 32.8   MCHC 32.0*  --  33.9 33.6   RDW 59.6*  --  57.9* 57.2*   PLATELETCT 249  --  268 265   MPV 10.6  --  10.5 10.5       Recent Labs     01/20/23  1100 01/21/23  0246 01/22/23  0210   SODIUM 140 137 141   POTASSIUM 2.9* 3.3* 4.0   CHLORIDE 113* 109 106   CO2 15* 18* 27   GLUCOSE 107* 109* 87   BUN 9 9 6*   CREATININE 0.88 0.89 0.70   CALCIUM 7.7* 7.8* 7.9*                     Imaging  EC-ECHOCARDIOGRAM COMPLETE W/O CONT   Final Result      IR-PICC LINE PLACEMENT W/ GUIDANCE > AGE 5   Final Result                  Ultrasound-guided PICC placement performed by qualified nursing staff as    above.          CT-CTA COMPLETE THORACOABDOMINAL AORTA   Final Result      1.  No evidence for  "aortic aneurysm or dissection.   2.  No acute cardiopulmonary disease.   3.  Biliary dilation, increased from prior exam concerning for distal stricture, stone or mass.   4.  Prior cholecystectomy.   5.  Bilateral nonobstructing kidney stones.   6.  Mildly enlarged bilateral external iliac and inguinal lymph nodes, of uncertain etiology.                        [id number]    (Results Pending)          Assessment/Plan  * SMAS (superior mesenteric artery syndrome) c/b feeding issues (HCC)- (present on admission)  Assessment & Plan  CTA was normal  Pain control with oral and IV narcotics  I had a lengthy discussion with her regarding placement of feeding tube and after discussion plan is to place Cortrak.  Later patient declined NG tube feeding and requested to place PEG tube so she can receive PEG tube and used to feed at home and can be discharged home in few days as she has kids at home.  I reconsulted with GI regarding possible placement of PEG tube.  I ordered MRCP and currently its pending.\"    Ordered tube feeds. Trial of Cortrak  If she does not tolerate above PEG or PEJ by Surgery    Bipolar 1 disorder (HCC)  Assessment & Plan  Restart paroxetine and Seroquel    Diarrhea  Assessment & Plan  Obtain stool cultures and C. difficile    Starvation ketoacidosis- (present on admission)  Assessment & Plan  Related to her underlying SMA syndrome which causes abdominal pain after eating food  cont thiamine  Monitor for refeeding syndrome-monitor potassium and phosphorus closely  Dietary consult  Bicarb signficantly improved and I discontinued bicarb gtt.  I discuss    Protein-calorie malnutrition, severe (HCC)- (present on admission)  Assessment & Plan  Supplements of been ordered  Check B12, vitamin D, iron\"    Explained that mild anasarca is due to low albumin  Address tube feeding,    Hypotension- (present on admission)  Assessment & Plan  Patient found to have significant hypotension.   Plan is to continue " "levophed with target MAP of 65.  Continue to monitor her in IMCU while is on pressor support. \"    Vitals:    01/22/23 1000   BP:    Pulse:    Resp:    Temp:    SpO2: 95%     Stable now, transferred to medical floor    Cachexia ([facility])- (present on admission)  Assessment & Plan  Dietary consult    Hypokalemia- (present on admission)  Assessment & Plan  Monitor closely for refeeding syndrome    High anion gap metabolic acidosis- (present on admission)  Assessment & Plan  Denies ingesting abnormal substance  Likely due to starvation ketoacidosis    Arthritis, rheumatoid ([facility])- (present on admission)  Assessment & Plan  Check ESR  Will need to restart her home medications after discharge    Leukocytosis- (present on admission)  Assessment & Plan  Pro-Vishnu is elevated  Start Flagyl and ceftriaxone      I discussed plan of care during multidisciplinary rounds regarding patient's current medical condition and plan of care.    VTE prophylaxis: SCDs/TEDs    Holding pharmacological DVT prophylaxis due to significant anemia and hemoglobin is trending down.    I have performed a physical exam and reviewed and updated ROS and Plan today (1/22/2023). In review of yesterday's note (1/21/2023), there are no changes except as documented above.        "

## 2023-01-22 NOTE — PROGRESS NOTES
"    DATE: 1/22/2023    Surgical consult for evaluation of feeding access.    INTERVAL EVENTS:  Doing well. On the phone with family.  Agreed to nasal feeding tube.    - Awaiting for MRI results.    PHYSICAL EXAMINATION:  Vital Signs: BP 94/54   Pulse 73   Temp 37.3 °C (99.1 °F) (Temporal)   Resp 18   Ht 1.6 m (5' 2.99\")   Wt 51.3 kg (113 lb)   SpO2 95%   Physical Exam  Constitutional:       Appearance: She is cachectic. She is ill-appearing.   HENT:      Mouth/Throat:      Mouth: Mucous membranes are dry.      Pharynx: Oropharynx is clear.   Pulmonary:      Effort: Pulmonary effort is normal.   Abdominal:      Palpations: Abdomen is soft.      Tenderness: There is no abdominal tenderness. There is no guarding.   Skin:     General: Skin is warm.   Neurological:      Mental Status: She is alert.   Psychiatric:         Behavior: Behavior is cooperative.       LABORATORY VALUES:   Recent Labs     01/20/23  0341 01/20/23  1100 01/21/23  0246 01/22/23  0210   WBC 14.1*  --  8.2 6.2   RBC 2.71*  --  2.54* 2.41*   HEMOGLOBIN 8.7* 8.9* 8.4* 7.9*   HEMATOCRIT 27.2* 26.4* 24.8* 23.5*   .4*  --  97.6 97.5   MCH 32.1  --  33.1* 32.8   MCHC 32.0*  --  33.9 33.6   RDW 59.6*  --  57.9* 57.2*   PLATELETCT 249  --  268 265   MPV 10.6  --  10.5 10.5     Recent Labs     01/20/23  1100 01/21/23  0246 01/22/23  0210   SODIUM 140 137 141   POTASSIUM 2.9* 3.3* 4.0   CHLORIDE 113* 109 106   CO2 15* 18* 27   GLUCOSE 107* 109* 87   BUN 9 9 6*   CREATININE 0.88 0.89 0.70   CALCIUM 7.7* 7.8* 7.9*     Recent Labs     01/19/23  2255 01/20/23  0341 01/21/23  0246 01/22/23  0210   ASTSGOT 5* 6* 7* 7*   ALTSGPT <5 <5 <5 <5   TBILIRUBIN <0.2 <0.2 <0.2 0.2   ALKPHOSPHAT 98 96 83 75   GLOBULIN 2.5 2.5 2.4 2.4   AMMONIA 19  --   --   --             IMAGING:   EC-ECHOCARDIOGRAM COMPLETE W/O CONT   Final Result      IR-PICC LINE PLACEMENT W/ GUIDANCE > AGE 5   Final Result                  Ultrasound-guided PICC placement performed by " qualified nursing staff as    above.          CT-CTA COMPLETE THORACOABDOMINAL AORTA   Final Result      1.  No evidence for aortic aneurysm or dissection.   2.  No acute cardiopulmonary disease.   3.  Biliary dilation, increased from prior exam concerning for distal stricture, stone or mass.   4.  Prior cholecystectomy.   5.  Bilateral nonobstructing kidney stones.   6.  Mildly enlarged bilateral external iliac and inguinal lymph nodes, of uncertain etiology.                        [id number]    (Results Pending)       ASSESSMENT AND PLAN:   No new Assessment & Plan notes have been filed under this hospital service since the last note was generated.  Service: Surgery General      Discussed patient condition with RN, Patient, and general surgery, Dr. Dumont.

## 2023-01-22 NOTE — DISCHARGE PLANNING
"Care Transition Team Discharge Planning                   Discharge Plan:  TBD    Pt is pending PT/OT eval notes.     Per RN Jack Pt needs an MRI pending schedule.     Pt has  a PICC line.    Per Chart\" Consult received for TF. Per D/w MD, pt has agreed to nasal feeding tube for supplemental nutrition. \"    Will continue to assist as needed.  "

## 2023-01-22 NOTE — CARE PLAN
The patient is Stable - Low risk of patient condition declining or worsening    Shift Goals  Clinical Goals: Hemodynamic stability, pain control, nutrition  Patient Goals: Get home to kids  Family Goals: NAIN    Progress made toward(s) clinical / shift goals:  p will not experience any skin breakdown nor a fall during this hospital stay  Problem: Skin Integrity  Goal: Skin integrity is maintained or improved  Outcome: Progressing     Problem: Fall Risk  Goal: Patient will remain free from falls  Outcome: Progressing       Patient is not progressing towards the following goals:

## 2023-01-22 NOTE — DIETARY
Nutrition services: Day 3 of admit.  Mary Martinez is a 52 y.o. female with admitting DX of starvation ketoacidosis.    Consult received for tube feeding.  Pt is on a Regular diet with PO intake <50% on avg.  Pt to have NGT placed for supplemental tube feeding. NGT was placed today, not verified yet.  Plan to meet ~50% of pt's estimated needs via nocturnal TF. Will continue regular diet with nourishments during the day.   Will utilize low-volume, peptide-based, fiber-free formula to promote GI tolerance    Estimated needs (per RD assessment 1/20):   30-35 kcal/kg = 2290-0065 kcal/day  1.5-2.0 grams protein/kg = 74-98 grams protein/day    Malnutrition Risk from RD note 1/20: Pt continues to meet criteria for severe malnutrition in the setting of limited PO intake secondary to chronic abdominal pain. Despite weight stability, pt presents with severe fat loss and severe muscle wasting.     Recommendations/Plan:  Once NGT placement is verified, start Impact Peptide 1.5 @ 50 ml/hr x10 hours overnight (3072-5508) to provide 750 kcal, 47 grams protein, and 385 ml free water per day.  Encourage intake of meals.  Document intake of all meals as % taken in ADLs to provide interdisciplinary communication across all shifts.   Monitor weight.  Nutrition rep will continue to see patient for ongoing meal and snack preferences.     RD will continue to follow.

## 2023-01-23 LAB
ALBUMIN SERPL BCP-MCNC: 2.8 G/DL (ref 3.2–4.9)
ALBUMIN/GLOB SERPL: 1.1 G/DL
ALP SERPL-CCNC: 75 U/L (ref 30–99)
ALT SERPL-CCNC: <5 U/L (ref 2–50)
ANION GAP SERPL CALC-SCNC: 6 MMOL/L (ref 7–16)
AST SERPL-CCNC: 8 U/L (ref 12–45)
BILIRUB SERPL-MCNC: <0.2 MG/DL (ref 0.1–1.5)
BUN SERPL-MCNC: 8 MG/DL (ref 8–22)
CALCIUM ALBUM COR SERPL-MCNC: 9 MG/DL (ref 8.5–10.5)
CALCIUM SERPL-MCNC: 8 MG/DL (ref 8.5–10.5)
CHLORIDE SERPL-SCNC: 101 MMOL/L (ref 96–112)
CO2 SERPL-SCNC: 32 MMOL/L (ref 20–33)
CREAT SERPL-MCNC: 0.75 MG/DL (ref 0.5–1.4)
CRP SERPL HS-MCNC: 4.01 MG/DL (ref 0–0.75)
CRP SERPL HS-MCNC: 4.78 MG/DL (ref 0–0.75)
ERYTHROCYTE [DISTWIDTH] IN BLOOD BY AUTOMATED COUNT: 54.4 FL (ref 35.9–50)
GFR SERPLBLD CREATININE-BSD FMLA CKD-EPI: 96 ML/MIN/1.73 M 2
GLOBULIN SER CALC-MCNC: 2.6 G/DL (ref 1.9–3.5)
GLUCOSE SERPL-MCNC: 125 MG/DL (ref 65–99)
HCT VFR BLD AUTO: 25.7 % (ref 37–47)
HGB BLD-MCNC: 8.6 G/DL (ref 12–16)
MAGNESIUM SERPL-MCNC: 1.8 MG/DL (ref 1.5–2.5)
MCH RBC QN AUTO: 32.5 PG (ref 27–33)
MCHC RBC AUTO-ENTMCNC: 33.5 G/DL (ref 33.6–35)
MCV RBC AUTO: 97 FL (ref 81.4–97.8)
PHOSPHATE SERPL-MCNC: 2.6 MG/DL (ref 2.5–4.5)
PLATELET # BLD AUTO: 294 K/UL (ref 164–446)
PMV BLD AUTO: 10.2 FL (ref 9–12.9)
POTASSIUM SERPL-SCNC: 2.9 MMOL/L (ref 3.6–5.5)
PREALB SERPL-MCNC: 11 MG/DL (ref 18–38)
PREALB SERPL-MCNC: 11.6 MG/DL (ref 18–38)
PROT SERPL-MCNC: 5.4 G/DL (ref 6–8.2)
RBC # BLD AUTO: 2.65 M/UL (ref 4.2–5.4)
SODIUM SERPL-SCNC: 139 MMOL/L (ref 135–145)
WBC # BLD AUTO: 5.7 K/UL (ref 4.8–10.8)

## 2023-01-23 PROCEDURE — 85027 COMPLETE CBC AUTOMATED: CPT

## 2023-01-23 PROCEDURE — 86140 C-REACTIVE PROTEIN: CPT

## 2023-01-23 PROCEDURE — 770006 HCHG ROOM/CARE - MED/SURG/GYN SEMI*

## 2023-01-23 PROCEDURE — 80053 COMPREHEN METABOLIC PANEL: CPT

## 2023-01-23 PROCEDURE — 36415 COLL VENOUS BLD VENIPUNCTURE: CPT

## 2023-01-23 PROCEDURE — 700102 HCHG RX REV CODE 250 W/ 637 OVERRIDE(OP): Performed by: INTERNAL MEDICINE

## 2023-01-23 PROCEDURE — 99232 SBSQ HOSP IP/OBS MODERATE 35: CPT | Performed by: SURGERY

## 2023-01-23 PROCEDURE — 83735 ASSAY OF MAGNESIUM: CPT

## 2023-01-23 PROCEDURE — 97162 PT EVAL MOD COMPLEX 30 MIN: CPT

## 2023-01-23 PROCEDURE — 99232 SBSQ HOSP IP/OBS MODERATE 35: CPT | Performed by: INTERNAL MEDICINE

## 2023-01-23 PROCEDURE — A9270 NON-COVERED ITEM OR SERVICE: HCPCS | Performed by: INTERNAL MEDICINE

## 2023-01-23 PROCEDURE — 700111 HCHG RX REV CODE 636 W/ 250 OVERRIDE (IP): Performed by: STUDENT IN AN ORGANIZED HEALTH CARE EDUCATION/TRAINING PROGRAM

## 2023-01-23 PROCEDURE — 84134 ASSAY OF PREALBUMIN: CPT

## 2023-01-23 PROCEDURE — 700105 HCHG RX REV CODE 258: Performed by: STUDENT IN AN ORGANIZED HEALTH CARE EDUCATION/TRAINING PROGRAM

## 2023-01-23 PROCEDURE — 84100 ASSAY OF PHOSPHORUS: CPT

## 2023-01-23 RX ORDER — SODIUM BICARBONATE 650 MG/1
1300 TABLET ORAL 3 TIMES DAILY
Status: DISCONTINUED | OUTPATIENT
Start: 2023-01-23 | End: 2023-01-24

## 2023-01-23 RX ADMIN — SODIUM BICARBONATE 1950 MG: 650 TABLET ORAL at 04:11

## 2023-01-23 RX ADMIN — CYANOCOBALAMIN TAB 500 MCG 1000 MCG: 500 TAB at 17:37

## 2023-01-23 RX ADMIN — METRONIDAZOLE 500 MG: 500 TABLET ORAL at 12:57

## 2023-01-23 RX ADMIN — MAGNESIUM GLUCONATE 500 MG ORAL TABLET 400 MG: 500 TABLET ORAL at 06:00

## 2023-01-23 RX ADMIN — THIAMINE HYDROCHLORIDE 500 MG: 100 INJECTION, SOLUTION INTRAMUSCULAR; INTRAVENOUS at 13:02

## 2023-01-23 RX ADMIN — POTASSIUM BICARBONATE 50 MEQ: 978 TABLET, EFFERVESCENT ORAL at 17:38

## 2023-01-23 RX ADMIN — MIDODRINE HYDROCHLORIDE 10 MG: 5 TABLET ORAL at 17:37

## 2023-01-23 RX ADMIN — MIDODRINE HYDROCHLORIDE 10 MG: 5 TABLET ORAL at 08:10

## 2023-01-23 RX ADMIN — OXYCODONE HYDROCHLORIDE 5 MG: 5 TABLET ORAL at 04:13

## 2023-01-23 RX ADMIN — FOLIC ACID 1 MG: 1 TABLET ORAL at 04:13

## 2023-01-23 RX ADMIN — SODIUM BICARBONATE 1950 MG: 650 TABLET ORAL at 12:56

## 2023-01-23 RX ADMIN — OMEPRAZOLE 40 MG: KIT at 04:13

## 2023-01-23 RX ADMIN — QUETIAPINE FUMARATE 100 MG: 100 TABLET ORAL at 17:37

## 2023-01-23 RX ADMIN — THIAMINE HYDROCHLORIDE 500 MG: 100 INJECTION, SOLUTION INTRAMUSCULAR; INTRAVENOUS at 17:44

## 2023-01-23 RX ADMIN — CYANOCOBALAMIN TAB 500 MCG 1000 MCG: 500 TAB at 04:13

## 2023-01-23 RX ADMIN — PAROXETINE HYDROCHLORIDE 30 MG: 20 TABLET, FILM COATED ORAL at 17:37

## 2023-01-23 RX ADMIN — FOLIC ACID 1 MG: 1 TABLET ORAL at 17:37

## 2023-01-23 RX ADMIN — OXYCODONE HYDROCHLORIDE 5 MG: 5 TABLET ORAL at 08:10

## 2023-01-23 RX ADMIN — OXYCODONE HYDROCHLORIDE 5 MG: 5 TABLET ORAL at 17:36

## 2023-01-23 RX ADMIN — METRONIDAZOLE 500 MG: 500 TABLET ORAL at 05:00

## 2023-01-23 RX ADMIN — POTASSIUM BICARBONATE 50 MEQ: 978 TABLET, EFFERVESCENT ORAL at 10:55

## 2023-01-23 RX ADMIN — THIAMINE HYDROCHLORIDE 500 MG: 100 INJECTION, SOLUTION INTRAMUSCULAR; INTRAVENOUS at 04:14

## 2023-01-23 RX ADMIN — OXYCODONE HYDROCHLORIDE 5 MG: 5 TABLET ORAL at 12:57

## 2023-01-23 RX ADMIN — SODIUM BICARBONATE 1300 MG: 650 TABLET ORAL at 17:37

## 2023-01-23 RX ADMIN — OXYCODONE HYDROCHLORIDE 5 MG: 5 TABLET ORAL at 21:59

## 2023-01-23 RX ADMIN — MIDODRINE HYDROCHLORIDE 10 MG: 5 TABLET ORAL at 12:57

## 2023-01-23 ASSESSMENT — ENCOUNTER SYMPTOMS
SPEECH CHANGE: 0
SPUTUM PRODUCTION: 0
COUGH: 0
NAUSEA: 1
CONSTIPATION: 0
TINGLING: 0
EYE PAIN: 0
NECK PAIN: 0
FOCAL WEAKNESS: 0
ORTHOPNEA: 0
MYALGIAS: 1
FEVER: 0
BLURRED VISION: 0
WEAKNESS: 1
WEIGHT LOSS: 0
HALLUCINATIONS: 0
PHOTOPHOBIA: 0
ABDOMINAL PAIN: 1
PALPITATIONS: 0
CHILLS: 0
BACK PAIN: 0
HEADACHES: 0
DIZZINESS: 0
VOMITING: 0
TREMORS: 0
SENSORY CHANGE: 0
DIARRHEA: 0
SHORTNESS OF BREATH: 0
DOUBLE VISION: 0

## 2023-01-23 ASSESSMENT — COGNITIVE AND FUNCTIONAL STATUS - GENERAL
WALKING IN HOSPITAL ROOM: A LITTLE
MOVING TO AND FROM BED TO CHAIR: A LITTLE
MOBILITY SCORE: 17
SUGGESTED CMS G CODE MODIFIER MOBILITY: CK
CLIMB 3 TO 5 STEPS WITH RAILING: A LOT
STANDING UP FROM CHAIR USING ARMS: A LITTLE
TURNING FROM BACK TO SIDE WHILE IN FLAT BAD: A LITTLE
MOVING FROM LYING ON BACK TO SITTING ON SIDE OF FLAT BED: A LITTLE

## 2023-01-23 ASSESSMENT — GAIT ASSESSMENTS
DEVIATION: DECREASED HEEL STRIKE;DECREASED TOE OFF;BRADYKINETIC
GAIT LEVEL OF ASSIST: CONTACT GUARD ASSIST
ASSISTIVE DEVICE: FRONT WHEEL WALKER
DISTANCE (FEET): 90

## 2023-01-23 ASSESSMENT — PAIN DESCRIPTION - PAIN TYPE
TYPE: CHRONIC PAIN

## 2023-01-23 ASSESSMENT — LIFESTYLE VARIABLES: SUBSTANCE_ABUSE: 0

## 2023-01-23 NOTE — PROGRESS NOTES
Pt is high fall risk. Pt educated regarding the use of bed alarm for high fall risk. Pt refused. Safety education provided.

## 2023-01-23 NOTE — ASSESSMENT & PLAN NOTE
Consulted for g tube  Pt needs improved nutrition with NG feeds before considering surgical intervention  1/22 NGT feeds started

## 2023-01-23 NOTE — CARE PLAN
The patient is Stable - Low risk of patient condition declining or worsening    Shift Goals  Clinical Goals: comfort   Patient Goals: pt will be able to rest and sleep comfortably  Family Goals: NAIN    Progress made toward(s) clinical / shift goals:      Patient is not progressing towards the following goals:

## 2023-01-23 NOTE — PROGRESS NOTES
"    DATE: 1/23/2023    Surgical consult for evaluation of feeding access.  HD#4    INTERVAL EVENTS:  Tolerating NGT so far. Monitor for refeeding syndrome.  MRI negative for any anomalies    PHYSICAL EXAMINATION:  Vital Signs: /66   Pulse 68   Temp 37.4 °C (99.4 °F) (Temporal)   Resp 17   Ht 1.6 m (5' 2.99\")   Wt 51.3 kg (113 lb)   SpO2 90%   Physical Exam  Constitutional:       Appearance: She is cachectic. She is ill-appearing.   HENT:      Mouth/Throat:      Mouth: Mucous membranes are dry.      Pharynx: Oropharynx is clear.   Pulmonary:      Effort: Pulmonary effort is normal.   Abdominal:      Palpations: Abdomen is soft.      Tenderness: There is no abdominal tenderness. There is no guarding.   Musculoskeletal:         General: Normal range of motion.   Skin:     General: Skin is warm.   Neurological:      General: No focal deficit present.      Mental Status: She is alert.   Psychiatric:         Behavior: Behavior is cooperative.       LABORATORY VALUES:   Recent Labs     01/21/23 0246 01/22/23 0210 01/23/23 0429   WBC 8.2 6.2 5.7   RBC 2.54* 2.41* 2.65*   HEMOGLOBIN 8.4* 7.9* 8.6*   HEMATOCRIT 24.8* 23.5* 25.7*   MCV 97.6 97.5 97.0   MCH 33.1* 32.8 32.5   MCHC 33.9 33.6 33.5*   RDW 57.9* 57.2* 54.4*   PLATELETCT 268 265 294   MPV 10.5 10.5 10.2       Recent Labs     01/21/23 0246 01/22/23 0210 01/23/23 0429   SODIUM 137 141 139   POTASSIUM 3.3* 4.0 2.9*   CHLORIDE 109 106 101   CO2 18* 27 32   GLUCOSE 109* 87 125*   BUN 9 6* 8   CREATININE 0.89 0.70 0.75   CALCIUM 7.8* 7.9* 8.0*       Recent Labs     01/21/23 0246 01/22/23 0210 01/23/23 0429   ASTSGOT 7* 7* 8*   ALTSGPT <5 <5 <5   TBILIRUBIN <0.2 0.2 <0.2   ALKPHOSPHAT 83 75 75   GLOBULIN 2.4 2.4 2.6              IMAGING:   DX-ABDOMEN FOR TUBE PLACEMENT   Final Result      NG tube terminates over the stomach.      [id number]   Final Result      1.  No choledocholithiasis. Mild biliary dilatation is likely related to prior " cholecystectomy.   2.  Small volume ascites.   3.  Trace bilateral pleural effusions and associated atelectasis.      EC-ECHOCARDIOGRAM COMPLETE W/O CONT   Final Result      IR-PICC LINE PLACEMENT W/ GUIDANCE > AGE 5   Final Result                  Ultrasound-guided PICC placement performed by qualified nursing staff as    above.          CT-CTA COMPLETE THORACOABDOMINAL AORTA   Final Result      1.  No evidence for aortic aneurysm or dissection.   2.  No acute cardiopulmonary disease.   3.  Biliary dilation, increased from prior exam concerning for distal stricture, stone or mass.   4.  Prior cholecystectomy.   5.  Bilateral nonobstructing kidney stones.   6.  Mildly enlarged bilateral external iliac and inguinal lymph nodes, of uncertain etiology.                            ASSESSMENT AND PLAN:   Cachexia (HCC)- (present on admission)  Assessment & Plan  Consulted for g tube  Pt needs improved nutrition with NG feeds before considering surgical intervention  1/22 NGT feeds started    Will follow peripherally as will need improved nutrition for several days/weeks before intervention.    Discussed patient condition with Patient,

## 2023-01-23 NOTE — PROGRESS NOTES
Received bedside report from night shift RN.   Assumed care of patient at change of shift.   Assessment complete and POC discussed.   Patient is A&Ox4, on RA.   Reports 8/10 generalized RA pain - medicated with PRN Oxycodone.  Impact peptide running @ 25mL/hr (goal is 50mL/hr).  Tube feeds paused at 0800 per orders.  NGT flushed with 30 cc of water Q4 hours.  Patient is sitting up in bed for breakfast.   Bed is in lowest/locked position.   Call light and belongings are within reach.   No further needs at this time.

## 2023-01-23 NOTE — CARE PLAN
The patient is Stable - Low risk of patient condition declining or worsening    Shift Goals  Clinical Goals: wean O2, MRI, IV abx, electrolyte/vitamin therapy  Patient Goals: home  Family Goals: NAIN    Progress made toward(s) clinical / shift goals:  Patient ANOx4, VSS though patient has frequent/persistent hypotension, medications given per MAR, MRI completed, per order NG tube placed and verified, flushes completed, tube feeds to start tonight, still regular diet during days, patient with no further questions or concerns     Patient is not progressing towards the following goals:

## 2023-01-23 NOTE — PROGRESS NOTES
Alert and oriented x4. Offered fluids. Safety precautions in placed. Bed in lowest position. Upper side rails up. Treaded socks on. Reinforced the use of call light when needing assistance.

## 2023-01-23 NOTE — THERAPY
"Physical Therapy   Initial Evaluation     Patient Name: Mary Martinez  Age:  52 y.o., Sex:  female  Medical Record #: [id number]  Today's Date: 1/23/2023     Precautions  Precautions: (P) Fall Risk    Assessment  Patient is 52 y.o. female with a diagnosis of with admitting diagnosis of abdominal pain with n/v/d,  superior mesenteric artery syndrome. PMHx includes RA, SMA syndrome, chornic abdominal pain, duodenal perforation 2019, s/p cholectystectomy, bipolar disorder. Pt is agreeable to participation in therapy assessment; reports baseline waxing/waning RA pain and baseline functional mobility. Pt demonstrates good safety awareness and adequate functional strength for bed mobility with supervision and transfer to stand with SBA, ambulates in hallway with SBA. Pt will benefit from continued PT services in acute setting, recommend HHPT services upon DC. No DME DC needs at this time.       Plan    Physical Therapy Initial Treatment Plan   Treatment Plan : (P) Gait Training, Neuro Re-Education / Balance, Therapeutic Activities, Therapeutic Exercise  Treatment Frequency: (P) 2 Times per Week  Duration: (P) Until Therapy Goals Met    DC Equipment Recommendations: (P) None  Discharge Recommendations: (P) Recommend home health for continued physical therapy services       Subjective    \"What kind of exercises should I do when I get home?\"     Objective       01/23/23 1335   Charge Group   PT Evaluation PT Evaluation Mod   Total Time Spent   PT Total Time Yes   PT Evaluation Time Spent (Mins) 24    Services   Is patient using  services for this encounter? No   Initial Contact Note    Initial Contact Note Order Received and Verified, Physical Therapy Evaluation in Progress with Full Report to Follow.   Precautions   Precautions Fall Risk   Pain 0 - 10 Group   Location Knee;Shoulder;Wrist   Location Orientation Right;Left   Description Aching  (chronic RA pain)   Prior Living Situation   Prior " Services Intermittent Physical Support for ADL Per Family   Housing / Facility 1 Story House   Steps Into Home 0   Steps In Home 0   Equipment Owned 4-Wheel Walker;Quad Cane;Wheelchair   Lives with - Patient's Self Care Capacity Child Less than 18 Years of Age;Adult Children  (age 14-20)   Prior Level of Functional Mobility   Bed Mobility Independent   Transfer Status Independent   Ambulation Independent   Assistive Devices Used 4-Wheel Walker   Wheelchair Independent   History of Falls   History of Falls Yes   Date of Last Fall 12/28/22   Cognition    Cognition / Consciousness WDL   Level of Consciousness Alert   Active ROM Upper Body   Active ROM Upper Body  X   Comments decreased due to RA; shoulder flexion limited to 70-80 degrees B   Strength Upper Body   Upper Body Strength  X   Gross Strength Generalized Weakness, Equal Bilaterally.    Active ROM Lower Body    Active ROM Lower Body  X   Comments flexion in B hips/knees in standing; functional for transfers and ambulation   Strength Lower Body   Lower Body Strength  X   Gross Strength Generalized Weakness, Equal Bilaterally   Comments functional for transfers and ambulation due in part to pain from RA; pt reports she is near baseline mobility   Balance Assessment   Sitting Balance (Static) Fair +   Sitting Balance (Dynamic) Fair   Standing Balance (Static) Fair   Standing Balance (Dynamic) Fair -   Weight Shift Sitting Good   Weight Shift Standing Fair   Bed Mobility    Supine to Sit Standby Assist   Sit to Supine Standby Assist   Gait Analysis   Gait Level Of Assist Contact Guard Assist   Assistive Device Front Wheel Walker   Distance (Feet) 90   # of Times Distance was Traveled 1   Deviation Decreased Heel Strike;Decreased Toe Off;Bradykinetic   Functional Mobility   Sit to Stand Standby Assist   Bed, Chair, Wheelchair Transfer Standby Assist   Mobility supine to sit EOB, ambulation in hallway, up to chair   How much difficulty does the patient currently  have...   Turning over in bed (including adjusting bedclothes, sheets and blankets)? 3   Sitting down on and standing up from a chair with arms (e.g., wheelchair, bedside commode, etc.) 3   Moving from lying on back to sitting on the side of the bed? 3   How much help from another person does the patient currently need...   Moving to and from a bed to a chair (including a wheelchair)? 3   Need to walk in a hospital room? 3   Climbing 3-5 steps with a railing? 2   6 clicks Mobility Score 17   Activity Tolerance   Sitting in Chair 5 min + left in chair   Sitting Edge of Bed 4 min   Standing 5 min   Short Term Goals    Short Term Goal # 1 pt will demonstrate bed mobility mod I in 6 visits   Short Term Goal # 2 pt will demonstrate sit to stand from EOB/transfer to chair with good safety awareness and mod I in 6 visits   Short Term Goal # 3 ambulation with  feet with good safety awareness and supervision in 6 visits   Education Group   Education Provided Role of Physical Therapist;Gait Training;Transfer Status   Role of Physical Therapist Patient Response Patient;Acceptance;Explanation;Verbal Demonstration   Gait Training Patient Response Patient;Acceptance;Explanation;Action Demonstration;Demonstration   Transfer Status Patient Response Patient;Acceptance;Explanation;Demonstration;Verbal Demonstration;Action Demonstration   Physical Therapy Initial Treatment Plan    Treatment Plan  Gait Training;Neuro Re-Education / Balance;Therapeutic Activities;Therapeutic Exercise   Treatment Frequency 2 Times per Week   Duration Until Therapy Goals Met   Problem List    Problems Pain;Impaired Transfers;Impaired Balance;Decreased Activity Tolerance   Anticipated Discharge Equipment and Recommendations   DC Equipment Recommendations None   Discharge Recommendations Recommend home health for continued physical therapy services   Interdisciplinary Plan of Care Collaboration   IDT Collaboration with  Nursing;Occupational Therapist    Patient Position at End of Therapy Chair Alarm On;Seated;Call Light within Reach;Tray Table within Reach;Phone within Reach   Collaboration Comments RN updated   Session Information   Date / Session Number  1/23  -1  (1/2; 1/29)     Lindsay Miller DPT

## 2023-01-23 NOTE — CARE PLAN
The patient is Stable - Low risk of patient condition declining or worsening    Shift Goals  Clinical Goals: Pain management  Patient Goals: home  Family Goals: NAIN    Progress made toward(s) clinical / shift goals:  Pain managed with PRN Oxycodone and repositioning.     Problem: Knowledge Deficit - Standard  Goal: Patient and family/care givers will demonstrate understanding of plan of care, disease process/condition, diagnostic tests and medications  Outcome: Progressing     Problem: Pain - Standard  Goal: Alleviation of pain or a reduction in pain to the patient’s comfort goal  Outcome: Progressing     Problem: Skin Integrity  Goal: Skin integrity is maintained or improved  Outcome: Progressing     Problem: Fall Risk  Goal: Patient will remain free from falls  Outcome: Progressing

## 2023-01-24 LAB
ALBUMIN SERPL BCP-MCNC: 2.9 G/DL (ref 3.2–4.9)
ALBUMIN/GLOB SERPL: 1.1 G/DL
ALP SERPL-CCNC: 72 U/L (ref 30–99)
ALT SERPL-CCNC: <5 U/L (ref 2–50)
ANION GAP SERPL CALC-SCNC: 7 MMOL/L (ref 7–16)
AST SERPL-CCNC: 8 U/L (ref 12–45)
BACTERIA BLD CULT: NORMAL
BACTERIA BLD CULT: NORMAL
BILIRUB SERPL-MCNC: <0.2 MG/DL (ref 0.1–1.5)
BUN SERPL-MCNC: 9 MG/DL (ref 8–22)
CALCIUM ALBUM COR SERPL-MCNC: 9.3 MG/DL (ref 8.5–10.5)
CALCIUM SERPL-MCNC: 8.4 MG/DL (ref 8.5–10.5)
CHLORIDE SERPL-SCNC: 103 MMOL/L (ref 96–112)
CO2 SERPL-SCNC: 31 MMOL/L (ref 20–33)
CREAT SERPL-MCNC: 0.79 MG/DL (ref 0.5–1.4)
ERYTHROCYTE [DISTWIDTH] IN BLOOD BY AUTOMATED COUNT: 57.1 FL (ref 35.9–50)
GFR SERPLBLD CREATININE-BSD FMLA CKD-EPI: 91 ML/MIN/1.73 M 2
GLOBULIN SER CALC-MCNC: 2.7 G/DL (ref 1.9–3.5)
GLUCOSE SERPL-MCNC: 122 MG/DL (ref 65–99)
HCT VFR BLD AUTO: 27.3 % (ref 37–47)
HGB BLD-MCNC: 8.9 G/DL (ref 12–16)
MAGNESIUM SERPL-MCNC: 1.9 MG/DL (ref 1.5–2.5)
MCH RBC QN AUTO: 32.5 PG (ref 27–33)
MCHC RBC AUTO-ENTMCNC: 32.6 G/DL (ref 33.6–35)
MCV RBC AUTO: 99.6 FL (ref 81.4–97.8)
PHOSPHATE SERPL-MCNC: 1.9 MG/DL (ref 2.5–4.5)
PLATELET # BLD AUTO: 328 K/UL (ref 164–446)
PMV BLD AUTO: 10.4 FL (ref 9–12.9)
POTASSIUM SERPL-SCNC: 3.9 MMOL/L (ref 3.6–5.5)
PROT SERPL-MCNC: 5.6 G/DL (ref 6–8.2)
RBC # BLD AUTO: 2.74 M/UL (ref 4.2–5.4)
SIGNIFICANT IND 70042: NORMAL
SIGNIFICANT IND 70042: NORMAL
SITE SITE: NORMAL
SITE SITE: NORMAL
SODIUM SERPL-SCNC: 141 MMOL/L (ref 135–145)
SOURCE SOURCE: NORMAL
SOURCE SOURCE: NORMAL
WBC # BLD AUTO: 6.3 K/UL (ref 4.8–10.8)

## 2023-01-24 PROCEDURE — 80053 COMPREHEN METABOLIC PANEL: CPT

## 2023-01-24 PROCEDURE — 83735 ASSAY OF MAGNESIUM: CPT

## 2023-01-24 PROCEDURE — A9270 NON-COVERED ITEM OR SERVICE: HCPCS | Performed by: INTERNAL MEDICINE

## 2023-01-24 PROCEDURE — 700105 HCHG RX REV CODE 258: Performed by: STUDENT IN AN ORGANIZED HEALTH CARE EDUCATION/TRAINING PROGRAM

## 2023-01-24 PROCEDURE — 700102 HCHG RX REV CODE 250 W/ 637 OVERRIDE(OP): Performed by: INTERNAL MEDICINE

## 2023-01-24 PROCEDURE — 700111 HCHG RX REV CODE 636 W/ 250 OVERRIDE (IP): Performed by: STUDENT IN AN ORGANIZED HEALTH CARE EDUCATION/TRAINING PROGRAM

## 2023-01-24 PROCEDURE — 84100 ASSAY OF PHOSPHORUS: CPT

## 2023-01-24 PROCEDURE — 700101 HCHG RX REV CODE 250: Performed by: INTERNAL MEDICINE

## 2023-01-24 PROCEDURE — 85027 COMPLETE CBC AUTOMATED: CPT

## 2023-01-24 PROCEDURE — 99232 SBSQ HOSP IP/OBS MODERATE 35: CPT | Performed by: INTERNAL MEDICINE

## 2023-01-24 PROCEDURE — 770006 HCHG ROOM/CARE - MED/SURG/GYN SEMI*

## 2023-01-24 RX ORDER — GAUZE BANDAGE 2" X 2"
100 BANDAGE TOPICAL DAILY
Status: DISCONTINUED | OUTPATIENT
Start: 2023-01-25 | End: 2023-01-25 | Stop reason: HOSPADM

## 2023-01-24 RX ADMIN — CYANOCOBALAMIN TAB 500 MCG 1000 MCG: 500 TAB at 04:06

## 2023-01-24 RX ADMIN — POTASSIUM PHOSPHATE, MONOBASIC AND POTASSIUM PHOSPHATE, DIBASIC 30 MMOL: 224; 236 INJECTION, SOLUTION, CONCENTRATE INTRAVENOUS at 17:25

## 2023-01-24 RX ADMIN — FOLIC ACID 1 MG: 1 TABLET ORAL at 17:19

## 2023-01-24 RX ADMIN — FOLIC ACID 1 MG: 1 TABLET ORAL at 04:06

## 2023-01-24 RX ADMIN — CYANOCOBALAMIN TAB 500 MCG 1000 MCG: 500 TAB at 17:18

## 2023-01-24 RX ADMIN — MIDODRINE HYDROCHLORIDE 10 MG: 5 TABLET ORAL at 17:19

## 2023-01-24 RX ADMIN — MIDODRINE HYDROCHLORIDE 10 MG: 5 TABLET ORAL at 11:44

## 2023-01-24 RX ADMIN — SODIUM BICARBONATE 1300 MG: 650 TABLET ORAL at 11:44

## 2023-01-24 RX ADMIN — OXYCODONE HYDROCHLORIDE 5 MG: 5 TABLET ORAL at 03:36

## 2023-01-24 RX ADMIN — OXYCODONE HYDROCHLORIDE 5 MG: 5 TABLET ORAL at 11:53

## 2023-01-24 RX ADMIN — PAROXETINE HYDROCHLORIDE 30 MG: 20 TABLET, FILM COATED ORAL at 17:19

## 2023-01-24 RX ADMIN — POTASSIUM BICARBONATE 50 MEQ: 978 TABLET, EFFERVESCENT ORAL at 04:06

## 2023-01-24 RX ADMIN — OMEPRAZOLE 40 MG: KIT at 04:06

## 2023-01-24 RX ADMIN — OXYCODONE HYDROCHLORIDE 5 MG: 5 TABLET ORAL at 20:08

## 2023-01-24 RX ADMIN — POTASSIUM BICARBONATE 50 MEQ: 978 TABLET, EFFERVESCENT ORAL at 11:44

## 2023-01-24 RX ADMIN — SODIUM BICARBONATE 1300 MG: 650 TABLET ORAL at 04:06

## 2023-01-24 RX ADMIN — OXYCODONE HYDROCHLORIDE 5 MG: 5 TABLET ORAL at 09:02

## 2023-01-24 RX ADMIN — QUETIAPINE FUMARATE 100 MG: 100 TABLET ORAL at 17:19

## 2023-01-24 RX ADMIN — MIDODRINE HYDROCHLORIDE 10 MG: 5 TABLET ORAL at 08:05

## 2023-01-24 RX ADMIN — THIAMINE HYDROCHLORIDE 500 MG: 100 INJECTION, SOLUTION INTRAMUSCULAR; INTRAVENOUS at 11:56

## 2023-01-24 RX ADMIN — THIAMINE HYDROCHLORIDE 500 MG: 100 INJECTION, SOLUTION INTRAMUSCULAR; INTRAVENOUS at 04:17

## 2023-01-24 RX ADMIN — MAGNESIUM GLUCONATE 500 MG ORAL TABLET 400 MG: 500 TABLET ORAL at 06:00

## 2023-01-24 RX ADMIN — OXYCODONE HYDROCHLORIDE 5 MG: 5 TABLET ORAL at 15:47

## 2023-01-24 ASSESSMENT — PAIN DESCRIPTION - PAIN TYPE
TYPE: ACUTE PAIN
TYPE: ACUTE PAIN;CHRONIC PAIN
TYPE: ACUTE PAIN
TYPE: ACUTE PAIN

## 2023-01-24 ASSESSMENT — ENCOUNTER SYMPTOMS
ABDOMINAL PAIN: 1
NAUSEA: 0
SHORTNESS OF BREATH: 0
MYALGIAS: 1
WEAKNESS: 1

## 2023-01-24 NOTE — DIETARY
Nutrition Services Brief Update:    Problem: Nutritional:  Goal: Nutrition support tolerated and meeting greater than 85% of estimated needs  Outcome: Met    RD following.

## 2023-01-24 NOTE — PROGRESS NOTES
Pt. Received in bed awake, orientedx4. Intact NGT on R nare, on tube feed and oral feed. Encouraged oral intake pt. Tolerating well. PICC line intact. POC discussed with the pt. VS noted. Due meds given and tolerated. Needs attended.

## 2023-01-24 NOTE — PROGRESS NOTES
Hospital Medicine Daily Progress Note    Date of Service  1/24/2023    Chief Complaint  Mary Martinez is a 52 y.o. female admitted 1/19/2023 with abd pain    Hospital Course  Mary Martinez is a 52 y.o. female who presented 1/19/2023 with past medical history of rheumatoid arthritis, chronic abdominal pain, history of duodenal perforation in 2019, history of cholecystectomy presented to the hospital for abdominal pain.  Patient does have chronic epigastric abdominal pain for the past 2 years. Patient states that she recently lost her insurance and was unable to take home medications including rheumatoid arthritis patient or pain medications. She's had multiple scans that did not find SMA stenosis/syndrome.   She was admitted with starvation ketoacidosis. She was hypotensive thought to be from diarrhea and placed on pressor. GI was consulted, recommended MRCP which was unremarkable. NGT was recommended but patient refused. Case was discussed with surgery and IR, G tube is not recommended.    Interval Problem Update  She ate 25% of most her meals yesterday and today. She remains on tube feeds overnight.   SBP 90s, on midodrine  Phos is low, replete  Patient says she is feeling better today and would like to go home. She has difficult home situation as well as frequent relapses of abd pain and not being able to eat. Surgery has signed off, G tube placement deferred  I spoke with son Lobo who will discuss further POC with the patient.    I have discussed this patient's plan of care and discharge plan at IDT rounds today with Case Management, Nursing, Nursing leadership, and other members of the IDT team.    Consultants/Specialty  general surgery and GI    Code Status  Full Code    Disposition  Patient is not medically cleared for discharge.   Anticipate discharge to to home with organized home healthcare and close outpatient follow-up.  I have placed the appropriate orders for post-discharge  needs.    Review of Systems  Review of Systems   Constitutional:  Positive for malaise/fatigue.   Respiratory:  Negative for shortness of breath.    Cardiovascular:  Negative for chest pain.   Gastrointestinal:  Positive for abdominal pain. Negative for nausea.   Genitourinary:  Negative for dysuria.   Musculoskeletal:  Positive for myalgias.   Neurological:  Positive for weakness.      Physical Exam  Temp:  [36.4 °C (97.6 °F)-37.3 °C (99.2 °F)] 36.8 °C (98.2 °F)  Pulse:  [60-74] 61  Resp:  [16-19] 18  BP: ()/(60-73) 97/60  SpO2:  [92 %-96 %] 92 %    Physical Exam  Vitals and nursing note reviewed.   Constitutional:       Appearance: She is ill-appearing.      Comments: Frail, thin   HENT:      Head:      Comments: Temporal wasting     Mouth/Throat:      Mouth: Mucous membranes are dry.   Eyes:      General:         Right eye: No discharge.         Left eye: No discharge.   Cardiovascular:      Rate and Rhythm: Normal rate and regular rhythm.   Pulmonary:      Effort: Pulmonary effort is normal. No respiratory distress.      Breath sounds: No wheezing or rales.   Abdominal:      Palpations: Abdomen is soft.      Tenderness: There is no abdominal tenderness. There is no guarding or rebound.   Musculoskeletal:         General: No swelling.      Cervical back: Neck supple.      Comments: Diffuse muscle atrophy   Skin:     General: Skin is warm and dry.   Neurological:      Mental Status: She is alert and oriented to person, place, and time.       Fluids    Intake/Output Summary (Last 24 hours) at 1/24/2023 1524  Last data filed at 1/24/2023 1300  Gross per 24 hour   Intake 1050 ml   Output --   Net 1050 ml       Laboratory  Recent Labs     01/22/23  0210 01/23/23  0429 01/24/23  0400   WBC 6.2 5.7 6.3   RBC 2.41* 2.65* 2.74*   HEMOGLOBIN 7.9* 8.6* 8.9*   HEMATOCRIT 23.5* 25.7* 27.3*   MCV 97.5 97.0 99.6*   MCH 32.8 32.5 32.5   MCHC 33.6 33.5* 32.6*   RDW 57.2* 54.4* 57.1*   PLATELETCT 265 294 328   MPV 10.5 10.2  10.4     Recent Labs     01/22/23  0210 01/23/23  0429 01/24/23  0400   SODIUM 141 139 141   POTASSIUM 4.0 2.9* 3.9   CHLORIDE 106 101 103   CO2 27 32 31   GLUCOSE 87 125* 122*   BUN 6* 8 9   CREATININE 0.70 0.75 0.79   CALCIUM 7.9* 8.0* 8.4*                   Imaging  DX-ABDOMEN FOR TUBE PLACEMENT   Final Result      NG tube terminates over the stomach.      [id number]-M/O   Final Result      1.  No choledocholithiasis. Mild biliary dilatation is likely related to prior cholecystectomy.   2.  Small volume ascites.   3.  Trace bilateral pleural effusions and associated atelectasis.      EC-ECHOCARDIOGRAM COMPLETE W/O CONT   Final Result      IR-PICC LINE PLACEMENT W/ GUIDANCE > AGE 5   Final Result                  Ultrasound-guided PICC placement performed by qualified nursing staff as    above.          CT-CTA COMPLETE THORACOABDOMINAL AORTA   Final Result      1.  No evidence for aortic aneurysm or dissection.   2.  No acute cardiopulmonary disease.   3.  Biliary dilation, increased from prior exam concerning for distal stricture, stone or mass.   4.  Prior cholecystectomy.   5.  Bilateral nonobstructing kidney stones.   6.  Mildly enlarged bilateral external iliac and inguinal lymph nodes, of uncertain etiology.                             Assessment/Plan  * SMAS (superior mesenteric artery syndrome) c/b feeding issues (HCC)- (present on admission)  Assessment & Plan  CTA was normal  MRCP normal  General surgery following  GS wants to defer G tube placement  Cant go home with NG/cortrak in place  Monitor, encourage oral intake        Bipolar 1 disorder (HCC)- (present on admission)  Assessment & Plan  Restart paroxetine and Seroquel    Diarrhea- (present on admission)  Assessment & Plan  Infectious work up negative  TF related?  monitor    Starvation ketoacidosis- (present on admission)  Assessment & Plan  Related to her underlying SMA syndrome which causes abdominal pain after eating food  cont  thiamine  Monitor for refeeding syndrome-monitor potassium and phosphorus closely  Dietary consult    Protein-calorie malnutrition, severe (HCC)- (present on admission)  Assessment & Plan  Supplements of been ordered  Remains dependent on tube feeds because of low oral intake  Monitor for re-feeding syndrome    Hypotension- (present on admission)  Assessment & Plan  Resolved, continue midodrine    Hypokalemia- (present on admission)  Assessment & Plan  Improved, continue repletion and monitor    High anion gap metabolic acidosis- (present on admission)  Assessment & Plan  resolved    Arthritis, rheumatoid (HCC)- (present on admission)  Assessment & Plan  At her baseline  Will need to restart her home medications after discharge    Leukocytosis- (present on admission)  Assessment & Plan  Stop IV abx's  No clear e/o active infection  Received 4 days of IV CTX/flagyl empirically         VTE prophylaxis: SCDs/TEDs    I have performed a physical exam and reviewed and updated ROS and Plan today (1/24/2023). In review of yesterday's note (1/23/2023), there are no changes except as documented above.

## 2023-01-24 NOTE — CARE PLAN
The patient is Stable - Low risk of patient condition declining or worsening    Shift Goals  Clinical Goals: tolerate tube feeds, pain will decrease from 8 to 3      Progress made toward(s) clinical / shift goals:  tolerating tube feed at 50ml/hr rate, pain decreased and pt. Able to sleep.       Problem: Knowledge Deficit - Standard  Goal: Patient and family/care givers will demonstrate understanding of plan of care, disease process/condition, diagnostic tests and medications  Outcome: Progressing     Problem: Pain - Standard  Goal: Alleviation of pain or a reduction in pain to the patient’s comfort goal  Outcome: Progressing     Problem: Skin Integrity  Goal: Skin integrity is maintained or improved  Outcome: Progressing     Problem: Fall Risk  Goal: Patient will remain free from falls  Outcome: Progressing

## 2023-01-24 NOTE — PROGRESS NOTES
[facility] Medicine Daily Progress Note    Date of Service  1/23/2023    Chief Complaint  Mary Martinez is a 52 y.o. female admitted 1/19/2023 with abdominal pain and diarrhea    Hospital Course    Mary Martinez is a 52 y.o. female who presented 1/19/2023 with past medical history of rheumatoid arthritis, SMA syndrome, chronic abdominal pain, history of duodenal perforation in 2019, history of cholecystectomy presented to the hospital for abdominal pain.  Patient does have chronic epigastric abdominal pain (present for the past 2 years.  Premedications and had a celiac block.  The past 2 weeks she is to have lower abdominal pain associated with nausea, vomiting, diarrhea.  Abdominal pain will be worse after eating food.  She would have a bowel movement 4 times a day of watery stool.  She denies any fever, chills, dysuria.  Patient states that she recently lost her insurance and was unable to take home medications including rheumatoid arthritis patient or pain medications.  Her last real meal was 2 weeks ago.  She has been tolerating liquids.    GI consulted and they recommended MRCP to evaluate it further.  I discussed plan of care with patient and ordered cortrak.  01/21/23    I evaluated and examined her at the bedside  She reported that she is feeling better and she consumed less than 50% of hardener but  I had a lengthy discussion with her regarding placement of feeding tube and after discussion plan is to place Cortrak.  Later patient declined NG tube feeding and requested to place PEG tube so she can receive PEG tube and used to feed at home and can be discharged home in few days as she has kids at home.  I reconsulted with GI regarding possible placement of PEG tube.  I discussed plan of care with nutritionist.  She is still requiring small amount of Levophed to keep up her blood pressure.  Plan is to titrate down her Levophed and transfer her to telemetry floor.  I discussed plan of care with  patient and answered all her questions.  Continue to monitor electrolytes and replace as needed.      Addendum: GI recommended to place IR guided feeding tube in jejunum.  I placed IR consult.    Addendum: I discussed with IR Dr. Edwards who recommended that IR did not place feeding tube ingestional and the recommended surgical consultation.  I requested consult with general surgery and discuss it with Dr. Dumont.    Interval Problem Update  SMA syndrome-still having issues with oral intake  Food issues-long discussion with patient today. She openly admits to having physical barriers with cooking and preparing food and eating food at home given her various physical ailments. Single mother with 3 kids, ages ranging from 14-20, eldest with advanced Down's syndrome. Disscussed at IDT rounds. I discussed at length with patient about trying to eat some food here, since she cant go home with NG cortrak in place. Surgery wants to defer. Overall oral intake remains low.       I have discussed this patient's plan of care and discharge plan at IDT rounds today with Case Management, Nursing, Nursing leadership, and other members of the IDT team.    Consultants/Specialty  GI  Surgery    Code Status  Full Code    Disposition  Patient is not medically cleared for discharge.   Anticipate discharge to to home with close outpatient follow-up. Difficult dispo given need for supplement enteral feeding through feeding tube but surgery does not want to place a G tube at this time. Cannot go home with this and has continued poor oral intake. Poor stable social situation at home.   I have placed the appropriate orders for post-discharge needs.    Review of Systems  Review of Systems   Constitutional:  Positive for malaise/fatigue. Negative for chills, fever and weight loss.        No clear interval changes noted today   HENT:  Negative for hearing loss and tinnitus.    Eyes:  Negative for blurred vision, double vision, photophobia and  pain.   Respiratory:  Negative for cough, sputum production and shortness of breath.    Cardiovascular:  Negative for chest pain, palpitations, orthopnea and leg swelling.   Gastrointestinal:  Positive for abdominal pain and nausea. Negative for constipation, diarrhea and vomiting.   Genitourinary:  Negative for dysuria, frequency and urgency.   Musculoskeletal:  Positive for joint pain and myalgias. Negative for back pain and neck pain.   Skin:  Negative for rash.   Neurological:  Positive for weakness. Negative for dizziness, tingling, tremors, sensory change, speech change, focal weakness and headaches.   Psychiatric/Behavioral:  Negative for hallucinations and substance abuse.    All other systems reviewed and are negative.     Physical Exam  Temp:  [36.9 °C (98.4 °F)-37.4 °C (99.4 °F)] 37.2 °C (98.9 °F)  Pulse:  [60-86] 60  Resp:  [16-18] 16  BP: ()/(52-73) 107/73  SpO2:  [90 %-96 %] 96 %    Physical Exam  Vitals reviewed.   Constitutional:       General: She is not in acute distress.     Appearance: Normal appearance. She is ill-appearing.      Comments: Appears much older than stated age   HENT:      Head: Normocephalic and atraumatic.      Nose: No congestion.      Comments: NG tube/cortrak in place  Eyes:      General:         Right eye: No discharge.         Left eye: No discharge.      Pupils: Pupils are equal, round, and reactive to light.   Cardiovascular:      Rate and Rhythm: Normal rate and regular rhythm.      Pulses: Normal pulses.      Heart sounds: Normal heart sounds. No murmur heard.  Pulmonary:      Effort: Pulmonary effort is normal. No respiratory distress.      Breath sounds: Normal breath sounds. No stridor.   Abdominal:      General: Bowel sounds are normal. There is no distension.      Palpations: Abdomen is soft.      Tenderness: There is abdominal tenderness (Mild).   Genitourinary:     Comments: Mild anasarca  Musculoskeletal:         General: Deformity present. No swelling or  tenderness. Normal range of motion.      Cervical back: Normal range of motion. No rigidity.      Right lower leg: Edema present.      Left lower leg: Edema present.      Comments: Missing all digits of R hand  Severe RA changes noted   Skin:     General: Skin is warm.      Capillary Refill: Capillary refill takes less than 2 seconds.      Coloration: Skin is not jaundiced or pale.      Findings: No bruising.   Neurological:      General: No focal deficit present.      Mental Status: She is alert and oriented to person, place, and time.      Cranial Nerves: No cranial nerve deficit.   Psychiatric:         Mood and Affect: Mood normal.         Behavior: Behavior normal.       Fluids    Intake/Output Summary (Last 24 hours) at 1/23/2023 1709  Last data filed at 1/23/2023 1142  Gross per 24 hour   Intake 932 ml   Output --   Net 932 ml       Laboratory  Recent Labs     01/21/23  0246 01/22/23  0210 01/23/23  0429   WBC 8.2 6.2 5.7   RBC 2.54* 2.41* 2.65*   HEMOGLOBIN 8.4* 7.9* 8.6*   HEMATOCRIT 24.8* 23.5* 25.7*   MCV 97.6 97.5 97.0   MCH 33.1* 32.8 32.5   MCHC 33.9 33.6 33.5*   RDW 57.9* 57.2* 54.4*   PLATELETCT 268 265 294   MPV 10.5 10.5 10.2     Recent Labs     01/21/23  0246 01/22/23  0210 01/23/23  0429   SODIUM 137 141 139   POTASSIUM 3.3* 4.0 2.9*   CHLORIDE 109 106 101   CO2 18* 27 32   GLUCOSE 109* 87 125*   BUN 9 6* 8   CREATININE 0.89 0.70 0.75   CALCIUM 7.8* 7.9* 8.0*                   Imaging  DX-ABDOMEN FOR TUBE PLACEMENT   Final Result      NG tube terminates over the stomach.      AA-HAXMEBS-M/O   Final Result      1.  No choledocholithiasis. Mild biliary dilatation is likely related to prior cholecystectomy.   2.  Small volume ascites.   3.  Trace bilateral pleural effusions and associated atelectasis.      EC-ECHOCARDIOGRAM COMPLETE W/O CONT   Final Result      IR-PICC LINE PLACEMENT W/ GUIDANCE > AGE 5   Final Result                  Ultrasound-guided PICC placement performed by qualified nursing  staff as    above.          CT-CTA COMPLETE THORACOABDOMINAL AORTA   Final Result      1.  No evidence for aortic aneurysm or dissection.   2.  No acute cardiopulmonary disease.   3.  Biliary dilation, increased from prior exam concerning for distal stricture, stone or mass.   4.  Prior cholecystectomy.   5.  Bilateral nonobstructing kidney stones.   6.  Mildly enlarged bilateral external iliac and inguinal lymph nodes, of uncertain etiology.                               Assessment/Plan  * SMAS (superior mesenteric artery syndrome) c/b feeding issues (HCC)- (present on admission)  Assessment & Plan  CTA was normal  MRCP normal  General surgery following  Clinically, she remains without improvement, continued significant belly pain along with nearly absent oral intake  GS wants to defer G tube placement  Cant go home with NG/cortrak in place  Monitor, encourage oral intake        Bipolar 1 disorder (HCC)- (present on admission)  Assessment & Plan  Restart paroxetine and Seroquel    Diarrhea- (present on admission)  Assessment & Plan  Infectious work up negative  TF related?  monitor    Starvation ketoacidosis- (present on admission)  Assessment & Plan  Related to her underlying SMA syndrome which causes abdominal pain after eating food  cont thiamine  Monitor for refeeding syndrome-monitor potassium and phosphorus closely  Dietary consult  Bicarb signficantly improved and I discontinued bicarb gtt.  Decrease NAHCO3 supplementation, given serum CO2 is now 32, I personally reviewed her BMP on 1/23    Protein-calorie malnutrition, severe (HCC)- (present on admission)  Assessment & Plan  Supplements of been ordered  TF in place  Monitor for re-feeding syndrome    Hypotension- (present on admission)  Assessment & Plan  Resolved, monitor    Hypokalemia- (present on admission)  Assessment & Plan  Significantly low at 2.9 today  Aggressive replacement  I ordered a CMP, Mg and PO4 level given high c/f mild re-feeding  syndrome for 1/24    High anion gap metabolic acidosis- (present on admission)  Assessment & Plan  resolved    Arthritis, rheumatoid (HCC)- (present on admission)  Assessment & Plan  At her baseline  Will need to restart her home medications after discharge    Leukocytosis- (present on admission)  Assessment & Plan  Stop IV abx's  No clear e/o active infection  Received 4 days of IV CTX/flagyl empirically      I discussed plan of care during multidisciplinary rounds regarding patient's current medical condition and plan of care.    VTE prophylaxis: SCDs/TEDs    Holding pharmacological DVT prophylaxis due to significant anemia and hemoglobin is trending down.    I have performed a physical exam and reviewed and updated ROS and Plan today (1/23/2023). In review of yesterday's note (1/22/2023), there are no changes except as documented above.

## 2023-01-25 VITALS
OXYGEN SATURATION: 95 % | HEIGHT: 63 IN | WEIGHT: 113 LBS | RESPIRATION RATE: 18 BRPM | DIASTOLIC BLOOD PRESSURE: 59 MMHG | BODY MASS INDEX: 20.02 KG/M2 | HEART RATE: 62 BPM | TEMPERATURE: 98.7 F | SYSTOLIC BLOOD PRESSURE: 93 MMHG

## 2023-01-25 LAB
ALBUMIN SERPL BCP-MCNC: 3.2 G/DL (ref 3.2–4.9)
ALBUMIN/GLOB SERPL: 1 G/DL
ALP SERPL-CCNC: 78 U/L (ref 30–99)
ALT SERPL-CCNC: 6 U/L (ref 2–50)
ANION GAP SERPL CALC-SCNC: 9 MMOL/L (ref 7–16)
AST SERPL-CCNC: 11 U/L (ref 12–45)
BILIRUB SERPL-MCNC: <0.2 MG/DL (ref 0.1–1.5)
BUN SERPL-MCNC: 11 MG/DL (ref 8–22)
CALCIUM ALBUM COR SERPL-MCNC: 9 MG/DL (ref 8.5–10.5)
CALCIUM SERPL-MCNC: 8.4 MG/DL (ref 8.5–10.5)
CHLORIDE SERPL-SCNC: 102 MMOL/L (ref 96–112)
CO2 SERPL-SCNC: 26 MMOL/L (ref 20–33)
CREAT SERPL-MCNC: 0.85 MG/DL (ref 0.5–1.4)
ERYTHROCYTE [DISTWIDTH] IN BLOOD BY AUTOMATED COUNT: 58.9 FL (ref 35.9–50)
GFR SERPLBLD CREATININE-BSD FMLA CKD-EPI: 83 ML/MIN/1.73 M 2
GLOBULIN SER CALC-MCNC: 3.1 G/DL (ref 1.9–3.5)
GLUCOSE SERPL-MCNC: 115 MG/DL (ref 65–99)
HCT VFR BLD AUTO: 29.7 % (ref 37–47)
HGB BLD-MCNC: 9.5 G/DL (ref 12–16)
MAGNESIUM SERPL-MCNC: 1.9 MG/DL (ref 1.5–2.5)
MCH RBC QN AUTO: 32.1 PG (ref 27–33)
MCHC RBC AUTO-ENTMCNC: 32 G/DL (ref 33.6–35)
MCV RBC AUTO: 100.3 FL (ref 81.4–97.8)
PHOSPHATE SERPL-MCNC: 4.5 MG/DL (ref 2.5–4.5)
PLATELET # BLD AUTO: 360 K/UL (ref 164–446)
PMV BLD AUTO: 10.4 FL (ref 9–12.9)
POTASSIUM SERPL-SCNC: 4.8 MMOL/L (ref 3.6–5.5)
PROT SERPL-MCNC: 6.3 G/DL (ref 6–8.2)
RBC # BLD AUTO: 2.96 M/UL (ref 4.2–5.4)
SODIUM SERPL-SCNC: 137 MMOL/L (ref 135–145)
WBC # BLD AUTO: 8.5 K/UL (ref 4.8–10.8)

## 2023-01-25 PROCEDURE — 83735 ASSAY OF MAGNESIUM: CPT

## 2023-01-25 PROCEDURE — 700102 HCHG RX REV CODE 250 W/ 637 OVERRIDE(OP): Performed by: INTERNAL MEDICINE

## 2023-01-25 PROCEDURE — 80053 COMPREHEN METABOLIC PANEL: CPT

## 2023-01-25 PROCEDURE — 84100 ASSAY OF PHOSPHORUS: CPT

## 2023-01-25 PROCEDURE — 85027 COMPLETE CBC AUTOMATED: CPT

## 2023-01-25 PROCEDURE — A9270 NON-COVERED ITEM OR SERVICE: HCPCS | Performed by: INTERNAL MEDICINE

## 2023-01-25 PROCEDURE — 99239 HOSP IP/OBS DSCHRG MGMT >30: CPT | Performed by: INTERNAL MEDICINE

## 2023-01-25 RX ORDER — MIDODRINE HYDROCHLORIDE 10 MG/1
10 TABLET ORAL
Qty: 60 TABLET | Refills: 1 | Status: SHIPPED | OUTPATIENT
Start: 2023-01-25 | End: 2023-05-25

## 2023-01-25 RX ORDER — LANOLIN ALCOHOL/MO/W.PET/CERES
400 CREAM (GRAM) TOPICAL DAILY
Qty: 30 TABLET | Refills: 1 | Status: SHIPPED | OUTPATIENT
Start: 2023-01-26 | End: 2023-03-22

## 2023-01-25 RX ORDER — QUETIAPINE FUMARATE 100 MG/1
100 TABLET, FILM COATED ORAL EVERY EVENING
Qty: 60 TABLET | Refills: 3 | Status: SHIPPED | OUTPATIENT
Start: 2023-01-25 | End: 2023-10-06 | Stop reason: SDUPTHER

## 2023-01-25 RX ORDER — FOLIC ACID 1 MG/1
1 TABLET ORAL 2 TIMES DAILY
Qty: 30 TABLET | Refills: 3 | Status: SHIPPED | OUTPATIENT
Start: 2023-01-25 | End: 2023-05-25

## 2023-01-25 RX ORDER — LANOLIN ALCOHOL/MO/W.PET/CERES
100 CREAM (GRAM) TOPICAL DAILY
Qty: 30 TABLET | Refills: 1 | Status: SHIPPED | OUTPATIENT
Start: 2023-01-26 | End: 2023-03-22

## 2023-01-25 RX ORDER — OMEPRAZOLE 20 MG/1
20 CAPSULE, DELAYED RELEASE ORAL DAILY
Qty: 30 CAPSULE | Refills: 1 | Status: SHIPPED | OUTPATIENT
Start: 2023-01-25 | End: 2023-08-14

## 2023-01-25 RX ORDER — PAROXETINE 30 MG/1
30 TABLET, FILM COATED ORAL EVERY EVENING
Qty: 30 TABLET | Refills: 1 | Status: ON HOLD | OUTPATIENT
Start: 2023-01-25 | End: 2023-02-19

## 2023-01-25 RX ADMIN — MIDODRINE HYDROCHLORIDE 10 MG: 5 TABLET ORAL at 11:34

## 2023-01-25 RX ADMIN — MIDODRINE HYDROCHLORIDE 10 MG: 5 TABLET ORAL at 07:58

## 2023-01-25 RX ADMIN — CYANOCOBALAMIN TAB 500 MCG 1000 MCG: 500 TAB at 05:02

## 2023-01-25 RX ADMIN — OXYCODONE HYDROCHLORIDE 5 MG: 5 TABLET ORAL at 05:06

## 2023-01-25 RX ADMIN — SENNOSIDES AND DOCUSATE SODIUM 2 TABLET: 50; 8.6 TABLET ORAL at 05:02

## 2023-01-25 RX ADMIN — OXYCODONE HYDROCHLORIDE 5 MG: 5 TABLET ORAL at 01:05

## 2023-01-25 RX ADMIN — OXYCODONE HYDROCHLORIDE 5 MG: 5 TABLET ORAL at 10:04

## 2023-01-25 RX ADMIN — OMEPRAZOLE 40 MG: KIT at 05:03

## 2023-01-25 RX ADMIN — MAGNESIUM GLUCONATE 500 MG ORAL TABLET 400 MG: 500 TABLET ORAL at 05:04

## 2023-01-25 RX ADMIN — FOLIC ACID 1 MG: 1 TABLET ORAL at 05:03

## 2023-01-25 RX ADMIN — Medication 100 MG: at 07:58

## 2023-01-25 ASSESSMENT — PAIN DESCRIPTION - PAIN TYPE: TYPE: ACUTE PAIN

## 2023-01-25 NOTE — CARE PLAN
The patient is Stable - Low risk of patient condition declining or worsening    Shift Goals  Clinical Goals: NGT feeds, safe ambulation  Patient Goals: Rest  Family Goals: NAIN    Progress made toward(s) clinical / shift goals:  NGT feeds at goal rate per MAR; pt calls appropriately for RN to SBA her to bathroom and disconnect lines and tube feed.    Problem: Knowledge Deficit - Standard  Goal: Patient and family/care givers will demonstrate understanding of plan of care, disease process/condition, diagnostic tests and medications  Outcome: Progressing     Problem: Pain - Standard  Goal: Alleviation of pain or a reduction in pain to the patient’s comfort goal  Outcome: Progressing     Problem: Skin Integrity  Goal: Skin integrity is maintained or improved  Outcome: Progressing     Problem: Fall Risk  Goal: Patient will remain free from falls  Outcome: Progressing       Patient is not progressing towards the following goals:

## 2023-01-25 NOTE — PROGRESS NOTES
Bedside report received. Assumed care of patient this PM. Assessment completed      Patient is A&O x 4, pt calls for assistance appropriately  Reports 7 /10 pain, prn medication administered.  Pt is r/a  Mobility up self/ SBA to disconnect from lines and NGT   Bed alarm in off.  Voiding +  Flatus +            Plan of care reviewed with the patient. Bed is locked and in the lowest position. Call light is within reach. Patient encouraged to voice needs and concerns, all needs met at this time. Hourly rounding in place.

## 2023-01-25 NOTE — DISCHARGE SUMMARY
Discharge Summary    CHIEF COMPLAINT ON ADMISSION  Chief Complaint   Patient presents with    Abdominal Pain    Back Pain     Pain started 1 week ago.  Also c/o n/v/d.  Pt states back pain is from neck to mid back       Reason for Admission  abd pain, back pain     Admission Date  1/19/2023    CODE STATUS  Full Code    HPI & HOSPITAL COURSE  Mary Martinez is a 52 y.o. female who presented 1/19/2023 with past medical history of rheumatoid arthritis, chronic abdominal pain, history of duodenal perforation in 2019, history of cholecystectomy presented to the hospital for abdominal pain.  Patient does have chronic epigastric abdominal pain for the past 2 years. Patient states that she recently lost her insurance and was unable to take home medications including rheumatoid arthritis patient or pain medications. She's had multiple scans that did not find SMA stenosis/syndrome.   She was admitted with starvation ketoacidosis. She was hypotensive thought to be from diarrhea and placed on pressor. Her blood pressure stabilized on midodrine. GI was consulted, recommended MRCP which was unremarkable. Case was discussed with surgery and IR, G tube is not recommended. NGT was placed for additional nutrition. Her oral intake did improve some, about 25-50% of meals. I discussed with the patient and her son Lobo that she ongoing tube feeds will help her gain weight and added nutrition. She does not want to go to SNF to continue tube feeds. She has been eating better and her abd pain has improved, she instead would like to go home without tube feeds and continue improving her nutrition on her own. Her son and additional family will be helping her with meals and home care. She declines home health.     Therefore, she is discharged in good and stable condition to home with close outpatient follow-up.    The patient met 2-midnight criteria for an inpatient stay at the time of discharge.    Discharge Date  1/25/2023    FOLLOW  UP ITEMS POST DISCHARGE  Monitoring of oral intake, nutritional status and weight  Wean off midodrine if her BP improves    DISCHARGE DIAGNOSES  Principal Problem:    SMAS (superior mesenteric artery syndrome) c/b feeding issues (HCC) POA: Yes  Active Problems:    Leukocytosis POA: Yes    Arthritis, rheumatoid (HCC) POA: Yes    High anion gap metabolic acidosis POA: Yes    Hypokalemia POA: Yes    Hypotension POA: Yes    Protein-calorie malnutrition, severe (HCC) POA: Yes    Starvation ketoacidosis POA: Yes    Diarrhea POA: Yes    Bipolar 1 disorder ([facility]) POA: Yes  Resolved Problems:    * No resolved hospital problems. *      FOLLOW UP  No follow-up provider specified.    MEDICATIONS ON DISCHARGE     Medication List        START taking these medications        Instructions   cyanocobalamin 1000 MCG Tabs  Commonly known as: VITAMIN B12   Take 1 Tablet by mouth 2 times a day.  Dose: 1,000 mcg     magnesium oxide 400 (240 Mg) MG Tabs  Start taking on: January 26, 2023   Take 1 Tablet by mouth every day.  Dose: 400 mg     omeprazole 20 MG delayed-release capsule  Commonly known as: PRILOSEC   Take 1 Capsule by mouth every day.  Dose: 20 mg     thiamine 100 MG tablet  Start taking on: January 26, 2023  Commonly known as: THIAMINE   Take 1 Tablet by mouth every day.  Dose: 100 mg            CHANGE how you take these medications        Instructions   PARoxetine 30 MG Tabs  What changed: how much to take  Commonly known as: PAXIL   Take 1 Tablet by mouth every evening.  Dose: 30 mg            CONTINUE taking these medications        Instructions   folic acid 1 MG Tabs  Commonly known as: FOLVITE   Take 1 Tablet by mouth 2 times a day.  Dose: 1 mg     midodrine 10 MG tablet  Commonly known as: PROAMATINE   Take 1 Tablet by mouth 3 times a day with meals.  Dose: 10 mg     QUEtiapine 100 MG Tabs  Commonly known as: Seroquel   Take 1 Tablet by mouth every evening.  Dose: 100 mg     sucralfate 1 GM Tabs  Commonly known as:  CARAFATE   Take 1 Tablet by mouth 4 Times a Day,Before Meals and at Bedtime.  Dose: 1 g            STOP taking these medications      ibuprofen 200 MG Tabs  Commonly known as: MOTRIN              Allergies  Allergies   Allergen Reactions    Penicillins Anaphylaxis and Hives     Tolerates cephalosporins; reports throat swelling with PCN    Abilify Unspecified     Multiple side effects    Aripiprazole Nausea     Spasms, shaking      Nitrous Oxide Vomiting       DIET  Orders Placed This Encounter   Procedures    Diet Order Diet: Regular     Standing Status:   Standing     Number of Occurrences:   1     Order Specific Question:   Diet:     Answer:   Regular [1]    Diet: Diet Tube Feed; Formula: Impact Peptide 1.5 (x10 hours overnight); Goal Rate (mL/Hour): 50     NOCTURNAL TF TO RUN 0934-8400.     Standing Status:   Standing     Number of Occurrences:   1     Order Specific Question:   Diet     Answer:   Diet Tube Feed [35]     Order Specific Question:   Formula:     Answer:   Impact Peptide 1.5     Comments:   x10 hours overnight     Order Specific Question:   Goal Rate (mL/Hour)     Answer:   50       ACTIVITY  As tolerated.      CONSULTATIONS  Surgery, GI    PROCEDURES  DX-ABDOMEN FOR TUBE PLACEMENT   Final Result      NG tube terminates over the stomach.      [id number]   Final Result      1.  No choledocholithiasis. Mild biliary dilatation is likely related to prior cholecystectomy.   2.  Small volume ascites.   3.  Trace bilateral pleural effusions and associated atelectasis.      EC-ECHOCARDIOGRAM COMPLETE W/O CONT   Final Result      IR-PICC LINE PLACEMENT W/ GUIDANCE > AGE 5   Final Result                  Ultrasound-guided PICC placement performed by qualified nursing staff as    above.          CT-CTA COMPLETE THORACOABDOMINAL AORTA   Final Result      1.  No evidence for aortic aneurysm or dissection.   2.  No acute cardiopulmonary disease.   3.  Biliary dilation, increased from prior exam concerning for  distal stricture, stone or mass.   4.  Prior cholecystectomy.   5.  Bilateral nonobstructing kidney stones.   6.  Mildly enlarged bilateral external iliac and inguinal lymph nodes, of uncertain etiology.                              LABORATORY  Lab Results   Component Value Date    SODIUM 137 01/25/2023    POTASSIUM 4.8 01/25/2023    CHLORIDE 102 01/25/2023    CO2 26 01/25/2023    GLUCOSE 115 (H) 01/25/2023    BUN 11 01/25/2023    CREATININE 0.85 01/25/2023    CREATININE 0.7 11/29/2008        Lab Results   Component Value Date    WBC 8.5 01/25/2023    HEMOGLOBIN 9.5 (L) 01/25/2023    HEMATOCRIT 29.7 (L) 01/25/2023    PLATELETCT 360 01/25/2023        Total time of the discharge process exceeds 32 minutes.

## 2023-02-17 ENCOUNTER — HOSPITAL ENCOUNTER (INPATIENT)
Facility: MEDICAL CENTER | Age: 53
LOS: 2 days | DRG: 871 | End: 2023-02-19
Attending: EMERGENCY MEDICINE | Admitting: STUDENT IN AN ORGANIZED HEALTH CARE EDUCATION/TRAINING PROGRAM
Payer: MEDICAID

## 2023-02-17 ENCOUNTER — APPOINTMENT (OUTPATIENT)
Dept: RADIOLOGY | Facility: MEDICAL CENTER | Age: 53
DRG: 871 | End: 2023-02-17
Attending: EMERGENCY MEDICINE
Payer: MEDICAID

## 2023-02-17 DIAGNOSIS — M79.604 PAIN IN BOTH LOWER EXTREMITIES: ICD-10-CM

## 2023-02-17 DIAGNOSIS — G62.9 NEUROPATHY: ICD-10-CM

## 2023-02-17 DIAGNOSIS — E53.8 VITAMIN B12 DEFICIENCY NEUROPATHY (HCC): ICD-10-CM

## 2023-02-17 DIAGNOSIS — N30.00 ACUTE CYSTITIS WITHOUT HEMATURIA: ICD-10-CM

## 2023-02-17 DIAGNOSIS — G63 VITAMIN B12 DEFICIENCY NEUROPATHY (HCC): ICD-10-CM

## 2023-02-17 DIAGNOSIS — R10.13 EPIGASTRIC PAIN: ICD-10-CM

## 2023-02-17 DIAGNOSIS — R65.20 SEVERE SEPSIS (HCC): ICD-10-CM

## 2023-02-17 DIAGNOSIS — A41.9 SEVERE SEPSIS (HCC): ICD-10-CM

## 2023-02-17 DIAGNOSIS — E86.0 DEHYDRATION: ICD-10-CM

## 2023-02-17 DIAGNOSIS — A41.9 SEPSIS WITHOUT ACUTE ORGAN DYSFUNCTION, DUE TO UNSPECIFIED ORGANISM (HCC): ICD-10-CM

## 2023-02-17 DIAGNOSIS — R55 SYNCOPE, UNSPECIFIED SYNCOPE TYPE: ICD-10-CM

## 2023-02-17 DIAGNOSIS — Z72.0 TOBACCO ABUSE: ICD-10-CM

## 2023-02-17 DIAGNOSIS — M79.605 PAIN IN BOTH LOWER EXTREMITIES: ICD-10-CM

## 2023-02-17 DIAGNOSIS — R07.9 CHEST PAIN, UNSPECIFIED TYPE: ICD-10-CM

## 2023-02-17 LAB
ALBUMIN SERPL BCP-MCNC: 3.3 G/DL (ref 3.2–4.9)
ALBUMIN/GLOB SERPL: 0.9 G/DL
ALP SERPL-CCNC: 112 U/L (ref 30–99)
ALT SERPL-CCNC: 9 U/L (ref 2–50)
ANION GAP SERPL CALC-SCNC: 13 MMOL/L (ref 7–16)
APPEARANCE UR: ABNORMAL
AST SERPL-CCNC: 14 U/L (ref 12–45)
BACTERIA #/AREA URNS HPF: ABNORMAL /HPF
BASOPHILS # BLD AUTO: 0.4 % (ref 0–1.8)
BASOPHILS # BLD: 0.08 K/UL (ref 0–0.12)
BILIRUB SERPL-MCNC: <0.2 MG/DL (ref 0.1–1.5)
BILIRUB UR QL STRIP.AUTO: NEGATIVE
BUN SERPL-MCNC: 13 MG/DL (ref 8–22)
CALCIUM ALBUM COR SERPL-MCNC: 9 MG/DL (ref 8.5–10.5)
CALCIUM SERPL-MCNC: 8.4 MG/DL (ref 8.5–10.5)
CHLORIDE SERPL-SCNC: 113 MMOL/L (ref 96–112)
CO2 SERPL-SCNC: 12 MMOL/L (ref 20–33)
COLOR UR: YELLOW
CREAT SERPL-MCNC: 0.65 MG/DL (ref 0.5–1.4)
D DIMER PPP IA.FEU-MCNC: 8.27 UG/ML (FEU) (ref 0–0.5)
EKG IMPRESSION: NORMAL
EKG IMPRESSION: NORMAL
EOSINOPHIL # BLD AUTO: 0.23 K/UL (ref 0–0.51)
EOSINOPHIL NFR BLD: 1.2 % (ref 0–6.9)
EPI CELLS #/AREA URNS HPF: ABNORMAL /HPF
ERYTHROCYTE [DISTWIDTH] IN BLOOD BY AUTOMATED COUNT: 55.1 FL (ref 35.9–50)
FLUAV RNA SPEC QL NAA+PROBE: NEGATIVE
FLUBV RNA SPEC QL NAA+PROBE: NEGATIVE
GFR SERPLBLD CREATININE-BSD FMLA CKD-EPI: 107 ML/MIN/1.73 M 2
GLOBULIN SER CALC-MCNC: 3.5 G/DL (ref 1.9–3.5)
GLUCOSE SERPL-MCNC: 91 MG/DL (ref 65–99)
GLUCOSE UR STRIP.AUTO-MCNC: NEGATIVE MG/DL
HCG SERPL QL: NEGATIVE
HCT VFR BLD AUTO: 35.3 % (ref 37–47)
HGB BLD-MCNC: 11.3 G/DL (ref 12–16)
HYALINE CASTS #/AREA URNS LPF: ABNORMAL /LPF
IMM GRANULOCYTES # BLD AUTO: 0.14 K/UL (ref 0–0.11)
IMM GRANULOCYTES NFR BLD AUTO: 0.7 % (ref 0–0.9)
KETONES UR STRIP.AUTO-MCNC: NEGATIVE MG/DL
LACTATE SERPL-SCNC: 1.6 MMOL/L (ref 0.5–2)
LEUKOCYTE ESTERASE UR QL STRIP.AUTO: ABNORMAL
LYMPHOCYTES # BLD AUTO: 0.97 K/UL (ref 1–4.8)
LYMPHOCYTES NFR BLD: 4.9 % (ref 22–41)
MCH RBC QN AUTO: 31.9 PG (ref 27–33)
MCHC RBC AUTO-ENTMCNC: 32 G/DL (ref 33.6–35)
MCV RBC AUTO: 99.7 FL (ref 81.4–97.8)
MICRO URNS: ABNORMAL
MONOCYTES # BLD AUTO: 0.78 K/UL (ref 0–0.85)
MONOCYTES NFR BLD AUTO: 3.9 % (ref 0–13.4)
NEUTROPHILS # BLD AUTO: 17.8 K/UL (ref 2–7.15)
NEUTROPHILS NFR BLD: 88.9 % (ref 44–72)
NITRITE UR QL STRIP.AUTO: NEGATIVE
NRBC # BLD AUTO: 0 K/UL
NRBC BLD-RTO: 0 /100 WBC
PH UR STRIP.AUTO: 6.5 [PH] (ref 5–8)
PLATELET # BLD AUTO: 407 K/UL (ref 164–446)
PMV BLD AUTO: 10.9 FL (ref 9–12.9)
POTASSIUM SERPL-SCNC: 3.5 MMOL/L (ref 3.6–5.5)
PROT SERPL-MCNC: 6.8 G/DL (ref 6–8.2)
PROT UR QL STRIP: 30 MG/DL
RBC # BLD AUTO: 3.54 M/UL (ref 4.2–5.4)
RBC # URNS HPF: ABNORMAL /HPF
RBC UR QL AUTO: NEGATIVE
RSV RNA SPEC QL NAA+PROBE: NEGATIVE
SARS-COV-2 RNA RESP QL NAA+PROBE: NOTDETECTED
SODIUM SERPL-SCNC: 138 MMOL/L (ref 135–145)
SP GR UR STRIP.AUTO: 1.01
SPECIMEN SOURCE: NORMAL
TROPONIN T SERPL-MCNC: 8 NG/L (ref 6–19)
TROPONIN T SERPL-MCNC: 9 NG/L (ref 6–19)
UROBILINOGEN UR STRIP.AUTO-MCNC: 0.2 MG/DL
WBC # BLD AUTO: 20 K/UL (ref 4.8–10.8)
WBC #/AREA URNS HPF: ABNORMAL /HPF

## 2023-02-17 PROCEDURE — 87086 URINE CULTURE/COLONY COUNT: CPT

## 2023-02-17 PROCEDURE — 96375 TX/PRO/DX INJ NEW DRUG ADDON: CPT

## 2023-02-17 PROCEDURE — 85379 FIBRIN DEGRADATION QUANT: CPT

## 2023-02-17 PROCEDURE — 99222 1ST HOSP IP/OBS MODERATE 55: CPT | Performed by: STUDENT IN AN ORGANIZED HEALTH CARE EDUCATION/TRAINING PROGRAM

## 2023-02-17 PROCEDURE — 71045 X-RAY EXAM CHEST 1 VIEW: CPT

## 2023-02-17 PROCEDURE — 83605 ASSAY OF LACTIC ACID: CPT

## 2023-02-17 PROCEDURE — 84703 CHORIONIC GONADOTROPIN ASSAY: CPT

## 2023-02-17 PROCEDURE — 84484 ASSAY OF TROPONIN QUANT: CPT | Mod: 91

## 2023-02-17 PROCEDURE — 700102 HCHG RX REV CODE 250 W/ 637 OVERRIDE(OP): Performed by: EMERGENCY MEDICINE

## 2023-02-17 PROCEDURE — 93005 ELECTROCARDIOGRAM TRACING: CPT | Performed by: EMERGENCY MEDICINE

## 2023-02-17 PROCEDURE — 700117 HCHG RX CONTRAST REV CODE 255: Performed by: EMERGENCY MEDICINE

## 2023-02-17 PROCEDURE — 51701 INSERT BLADDER CATHETER: CPT

## 2023-02-17 PROCEDURE — 700111 HCHG RX REV CODE 636 W/ 250 OVERRIDE (IP): Performed by: EMERGENCY MEDICINE

## 2023-02-17 PROCEDURE — C9803 HOPD COVID-19 SPEC COLLECT: HCPCS | Performed by: EMERGENCY MEDICINE

## 2023-02-17 PROCEDURE — 74177 CT ABD & PELVIS W/CONTRAST: CPT

## 2023-02-17 PROCEDURE — 71275 CT ANGIOGRAPHY CHEST: CPT

## 2023-02-17 PROCEDURE — 81001 URINALYSIS AUTO W/SCOPE: CPT

## 2023-02-17 PROCEDURE — 700105 HCHG RX REV CODE 258: Performed by: EMERGENCY MEDICINE

## 2023-02-17 PROCEDURE — 700102 HCHG RX REV CODE 250 W/ 637 OVERRIDE(OP): Performed by: STUDENT IN AN ORGANIZED HEALTH CARE EDUCATION/TRAINING PROGRAM

## 2023-02-17 PROCEDURE — 770000 HCHG ROOM/CARE - INTERMEDIATE ICU *

## 2023-02-17 PROCEDURE — 96374 THER/PROPH/DIAG INJ IV PUSH: CPT

## 2023-02-17 PROCEDURE — A9270 NON-COVERED ITEM OR SERVICE: HCPCS | Performed by: EMERGENCY MEDICINE

## 2023-02-17 PROCEDURE — 87040 BLOOD CULTURE FOR BACTERIA: CPT | Mod: 91

## 2023-02-17 PROCEDURE — 99285 EMERGENCY DEPT VISIT HI MDM: CPT

## 2023-02-17 PROCEDURE — A9270 NON-COVERED ITEM OR SERVICE: HCPCS | Performed by: STUDENT IN AN ORGANIZED HEALTH CARE EDUCATION/TRAINING PROGRAM

## 2023-02-17 PROCEDURE — 0241U HCHG SARS-COV-2 COVID-19 NFCT DS RESP RNA 4 TRGT MIC: CPT

## 2023-02-17 PROCEDURE — 80053 COMPREHEN METABOLIC PANEL: CPT

## 2023-02-17 PROCEDURE — 93005 ELECTROCARDIOGRAM TRACING: CPT

## 2023-02-17 PROCEDURE — 36415 COLL VENOUS BLD VENIPUNCTURE: CPT

## 2023-02-17 PROCEDURE — 85025 COMPLETE CBC W/AUTO DIFF WBC: CPT

## 2023-02-17 RX ORDER — POLYETHYLENE GLYCOL 3350 17 G/17G
1 POWDER, FOR SOLUTION ORAL
Status: DISCONTINUED | OUTPATIENT
Start: 2023-02-17 | End: 2023-02-19 | Stop reason: HOSPADM

## 2023-02-17 RX ORDER — ACETAMINOPHEN 325 MG/1
650 TABLET ORAL EVERY 6 HOURS PRN
Status: DISCONTINUED | OUTPATIENT
Start: 2023-02-17 | End: 2023-02-19 | Stop reason: HOSPADM

## 2023-02-17 RX ORDER — PAROXETINE HYDROCHLORIDE 20 MG/1
30 TABLET, FILM COATED ORAL EVERY EVENING
Status: DISCONTINUED | OUTPATIENT
Start: 2023-02-17 | End: 2023-02-19 | Stop reason: HOSPADM

## 2023-02-17 RX ORDER — QUETIAPINE FUMARATE 25 MG/1
50 TABLET, FILM COATED ORAL EVERY EVENING
Status: DISCONTINUED | OUTPATIENT
Start: 2023-02-18 | End: 2023-02-18

## 2023-02-17 RX ORDER — OMEPRAZOLE 20 MG/1
20 CAPSULE, DELAYED RELEASE ORAL DAILY
Status: DISCONTINUED | OUTPATIENT
Start: 2023-02-18 | End: 2023-02-19 | Stop reason: HOSPADM

## 2023-02-17 RX ORDER — FOLIC ACID 1 MG/1
1 TABLET ORAL 2 TIMES DAILY
Status: DISCONTINUED | OUTPATIENT
Start: 2023-02-17 | End: 2023-02-19 | Stop reason: HOSPADM

## 2023-02-17 RX ORDER — MIDODRINE HYDROCHLORIDE 5 MG/1
10 TABLET ORAL
Status: DISCONTINUED | OUTPATIENT
Start: 2023-02-18 | End: 2023-02-19 | Stop reason: HOSPADM

## 2023-02-17 RX ORDER — CHOLECALCIFEROL (VITAMIN D3) 125 MCG
1000 CAPSULE ORAL DAILY
Status: DISCONTINUED | OUTPATIENT
Start: 2023-02-18 | End: 2023-02-19 | Stop reason: HOSPADM

## 2023-02-17 RX ORDER — SODIUM CHLORIDE 9 MG/ML
INJECTION, SOLUTION INTRAVENOUS ONCE
Status: COMPLETED | OUTPATIENT
Start: 2023-02-17 | End: 2023-02-17

## 2023-02-17 RX ORDER — GAUZE BANDAGE 2" X 2"
100 BANDAGE TOPICAL DAILY
Status: DISCONTINUED | OUTPATIENT
Start: 2023-02-18 | End: 2023-02-19 | Stop reason: HOSPADM

## 2023-02-17 RX ORDER — ENOXAPARIN SODIUM 100 MG/ML
40 INJECTION SUBCUTANEOUS DAILY
Status: DISCONTINUED | OUTPATIENT
Start: 2023-02-18 | End: 2023-02-19 | Stop reason: HOSPADM

## 2023-02-17 RX ORDER — LORAZEPAM 2 MG/ML
0.5 INJECTION INTRAMUSCULAR ONCE
Status: COMPLETED | OUTPATIENT
Start: 2023-02-17 | End: 2023-02-17

## 2023-02-17 RX ORDER — SODIUM CHLORIDE 9 MG/ML
1000 INJECTION, SOLUTION INTRAVENOUS CONTINUOUS
Status: DISCONTINUED | OUTPATIENT
Start: 2023-02-17 | End: 2023-02-17 | Stop reason: ALTCHOICE

## 2023-02-17 RX ORDER — LANOLIN ALCOHOL/MO/W.PET/CERES
400 CREAM (GRAM) TOPICAL DAILY
Status: DISCONTINUED | OUTPATIENT
Start: 2023-02-18 | End: 2023-02-19 | Stop reason: HOSPADM

## 2023-02-17 RX ORDER — MORPHINE SULFATE 4 MG/ML
2 INJECTION INTRAVENOUS
Status: DISCONTINUED | OUTPATIENT
Start: 2023-02-17 | End: 2023-02-18

## 2023-02-17 RX ORDER — AMOXICILLIN 250 MG
2 CAPSULE ORAL 2 TIMES DAILY
Status: DISCONTINUED | OUTPATIENT
Start: 2023-02-17 | End: 2023-02-19 | Stop reason: HOSPADM

## 2023-02-17 RX ORDER — BISACODYL 10 MG
10 SUPPOSITORY, RECTAL RECTAL
Status: DISCONTINUED | OUTPATIENT
Start: 2023-02-17 | End: 2023-02-19 | Stop reason: HOSPADM

## 2023-02-17 RX ORDER — NICOTINE 21 MG/24HR
21 PATCH, TRANSDERMAL 24 HOURS TRANSDERMAL
Status: DISCONTINUED | OUTPATIENT
Start: 2023-02-18 | End: 2023-02-19 | Stop reason: HOSPADM

## 2023-02-17 RX ORDER — CEFTRIAXONE 1 G/1
1000 INJECTION, POWDER, FOR SOLUTION INTRAMUSCULAR; INTRAVENOUS ONCE
Status: COMPLETED | OUTPATIENT
Start: 2023-02-17 | End: 2023-02-17

## 2023-02-17 RX ORDER — MIDODRINE HYDROCHLORIDE 5 MG/1
10 TABLET ORAL ONCE
Status: COMPLETED | OUTPATIENT
Start: 2023-02-17 | End: 2023-02-17

## 2023-02-17 RX ORDER — SODIUM CHLORIDE, SODIUM LACTATE, POTASSIUM CHLORIDE, CALCIUM CHLORIDE 600; 310; 30; 20 MG/100ML; MG/100ML; MG/100ML; MG/100ML
1000 INJECTION, SOLUTION INTRAVENOUS ONCE
Status: COMPLETED | OUTPATIENT
Start: 2023-02-17 | End: 2023-02-17

## 2023-02-17 RX ORDER — HYDROMORPHONE HYDROCHLORIDE 1 MG/ML
0.5 INJECTION, SOLUTION INTRAMUSCULAR; INTRAVENOUS; SUBCUTANEOUS
Status: DISCONTINUED | OUTPATIENT
Start: 2023-02-17 | End: 2023-02-18

## 2023-02-17 RX ADMIN — PAROXETINE HYDROCHLORIDE 30 MG: 20 TABLET, FILM COATED ORAL at 23:46

## 2023-02-17 RX ADMIN — FOLIC ACID 1 MG: 1 TABLET ORAL at 23:45

## 2023-02-17 RX ADMIN — MORPHINE SULFATE 2 MG: 4 INJECTION, SOLUTION INTRAMUSCULAR; INTRAVENOUS at 17:06

## 2023-02-17 RX ADMIN — SODIUM CHLORIDE 1000 ML: 9 INJECTION, SOLUTION INTRAVENOUS at 16:57

## 2023-02-17 RX ADMIN — HYDROMORPHONE HYDROCHLORIDE 0.5 MG: 1 INJECTION, SOLUTION INTRAMUSCULAR; INTRAVENOUS; SUBCUTANEOUS at 17:49

## 2023-02-17 RX ADMIN — LIDOCAINE HYDROCHLORIDE 30 ML: 20 SOLUTION OROPHARYNGEAL at 18:00

## 2023-02-17 RX ADMIN — MORPHINE SULFATE 2 MG: 4 INJECTION, SOLUTION INTRAMUSCULAR; INTRAVENOUS at 23:43

## 2023-02-17 RX ADMIN — CEFTRIAXONE SODIUM 1000 MG: 1 INJECTION, POWDER, FOR SOLUTION INTRAMUSCULAR; INTRAVENOUS at 19:48

## 2023-02-17 RX ADMIN — MIDODRINE HYDROCHLORIDE 10 MG: 5 TABLET ORAL at 20:21

## 2023-02-17 RX ADMIN — SODIUM CHLORIDE, POTASSIUM CHLORIDE, SODIUM LACTATE AND CALCIUM CHLORIDE 1000 ML: 600; 310; 30; 20 INJECTION, SOLUTION INTRAVENOUS at 20:18

## 2023-02-17 RX ADMIN — SODIUM CHLORIDE: 9 INJECTION, SOLUTION INTRAVENOUS at 19:00

## 2023-02-17 RX ADMIN — LORAZEPAM 0.5 MG: 2 INJECTION INTRAMUSCULAR; INTRAVENOUS at 17:50

## 2023-02-17 RX ADMIN — IOHEXOL 97 ML: 350 INJECTION, SOLUTION INTRAVENOUS at 21:17

## 2023-02-17 ASSESSMENT — PAIN DESCRIPTION - PAIN TYPE
TYPE: ACUTE PAIN
TYPE: CHRONIC PAIN
TYPE: CHRONIC PAIN

## 2023-02-17 ASSESSMENT — LIFESTYLE VARIABLES
AVERAGE NUMBER OF DAYS PER WEEK YOU HAVE A DRINK CONTAINING ALCOHOL: 0
DO YOU DRINK ALCOHOL: NO
HAVE YOU EVER FELT YOU SHOULD CUT DOWN ON YOUR DRINKING: NO
DOES PATIENT WANT TO STOP DRINKING: NO
ON A TYPICAL DAY WHEN YOU DRINK ALCOHOL HOW MANY DRINKS DO YOU HAVE: 0
HAVE PEOPLE ANNOYED YOU BY CRITICIZING YOUR DRINKING: NO
CONSUMPTION TOTAL: INCOMPLETE
EVER HAD A DRINK FIRST THING IN THE MORNING TO STEADY YOUR NERVES TO GET RID OF A HANGOVER: NO
HAVE PEOPLE ANNOYED YOU BY CRITICIZING YOUR DRINKING: NO
ALCOHOL_USE: NO
EVER FELT BAD OR GUILTY ABOUT YOUR DRINKING: NO
TOTAL SCORE: 0
EVER FELT BAD OR GUILTY ABOUT YOUR DRINKING: NO
HOW MANY TIMES IN THE PAST YEAR HAVE YOU HAD 5 OR MORE DRINKS IN A DAY: 0
EVER HAD A DRINK FIRST THING IN THE MORNING TO STEADY YOUR NERVES TO GET RID OF A HANGOVER: NO
DOES PATIENT WANT TO STOP DRINKING: NO
TOTAL SCORE: 0
CONSUMPTION TOTAL: NEGATIVE
HAVE YOU EVER FELT YOU SHOULD CUT DOWN ON YOUR DRINKING: NO
TOTAL SCORE: 0
TOTAL SCORE: 0

## 2023-02-17 ASSESSMENT — COGNITIVE AND FUNCTIONAL STATUS - GENERAL
SUGGESTED CMS G CODE MODIFIER MOBILITY: CJ
MOBILITY SCORE: 20
PERSONAL GROOMING: A LITTLE
DRESSING REGULAR UPPER BODY CLOTHING: A LITTLE
STANDING UP FROM CHAIR USING ARMS: A LITTLE
WALKING IN HOSPITAL ROOM: A LITTLE
CLIMB 3 TO 5 STEPS WITH RAILING: A LITTLE
MOVING FROM LYING ON BACK TO SITTING ON SIDE OF FLAT BED: A LITTLE
TOILETING: A LITTLE
HELP NEEDED FOR BATHING: A LITTLE

## 2023-02-17 ASSESSMENT — FIBROSIS 4 INDEX
FIB4 SCORE: 0.57
FIB4 SCORE: 0.62

## 2023-02-17 ASSESSMENT — PATIENT HEALTH QUESTIONNAIRE - PHQ9
SUM OF ALL RESPONSES TO PHQ9 QUESTIONS 1 AND 2: 1
1. LITTLE INTEREST OR PLEASURE IN DOING THINGS: NOT AT ALL
8. MOVING OR SPEAKING SO SLOWLY THAT OTHER PEOPLE COULD HAVE NOTICED. OR THE OPPOSITE, BEING SO FIGETY OR RESTLESS THAT YOU HAVE BEEN MOVING AROUND A LOT MORE THAN USUAL: SEVERAL DAYS
6. FEELING BAD ABOUT YOURSELF - OR THAT YOU ARE A FAILURE OR HAVE LET YOURSELF OR YOUR FAMILY DOWN: NOT AL ALL
7. TROUBLE CONCENTRATING ON THINGS, SUCH AS READING THE NEWSPAPER OR WATCHING TELEVISION: SEVERAL DAYS
2. FEELING DOWN, DEPRESSED, IRRITABLE, OR HOPELESS: SEVERAL DAYS
5. POOR APPETITE OR OVEREATING: SEVERAL DAYS
9. THOUGHTS THAT YOU WOULD BE BETTER OFF DEAD, OR OF HURTING YOURSELF: NOT AT ALL
3. TROUBLE FALLING OR STAYING ASLEEP OR SLEEPING TOO MUCH: NOT AT ALL

## 2023-02-17 NOTE — ED PROVIDER NOTES
ER Provider Note    Scribed for Yinka Lazo M.d. by Benjy Sebastian. 2/17/2023  3:59 PM    Primary Care Provider: Fidencio Christopher D.O.    CHIEF COMPLAINT  Chief Complaint   Patient presents with    Syncope    Hypotension     EXTERNAL RECORDS REVIEWED  Inpatient Notes Extensive past medical history including rheumatoid arthritis, chronic abdominal pain, duodenal perforation, admitted jan 19-25 for abdominal pain, hypotension, and anorexia. Was on tube feeds while in the hospital.     HPI/ROS  LIMITATION TO HISTORY   Select: : None    Mary Martinez is a 52 y.o. female who presents to the ED complaining of syncope onset prior to arrival today. Patient reports that her  called EMS because her RA was abnormal. She states that she got very very cold and turned on a space heater. She then stood up to turn off the heater and became very lightheaded with blurry vision, and collapsed onto the couch. She then took her blood pressure medication and does not remember any events after that. She reports having associated chronic abdominal pain, which has been exacerbated for the past few days. She adds that the pain has not changed in nature, but it is more intense. She denies any chest pain, shortness of breath, rhinorrhea, diarrhea, hematuria, dysuria, headache, or sore throat. She adds that she has been experiencing increased urinary frequency. She adds that she was on tube feeds in the hospital, but she has been eating a lot for the past few days. Patient is scheduled to see GI in June, but was advised to see them sooner. She has been making efforts to see GI as soon as possible.      PAST MEDICAL HISTORY  Past Medical History:   Diagnosis Date    Acute renal failure (ARF) (HCC)     Allergy     Anemia     Anxiety     Arthritis     Rheumatoid -follows with rheumatologist. Has several fractures due to RA.    ASTHMA     inhalers prn    Blood transfusion without reported diagnosis     Bowel habit changes      4/24/20-constipation and diarrhea.    Bronchitis last approx 2018    Chronic pain 04/24/2020    Due to RA    Dental disorder     no teeth upper-does not wear her denture. Broken teeth on bottom.    Depression     GERD (gastroesophageal reflux disease)     GIB (gastrointestinal bleeding)     Head ache     Heart burn     taking famotidine    Hiatus hernia syndrome     History of pancreatitis     Hypokalemia     Hyponatremia     Idiopathic chronic pancreatitis (HCC)     Indigestion     taking famotidine    Intractable nausea and vomiting     Kidney disease     Pain     CPS-everywhere    Pneumonia 10/2019    PONV (postoperative nausea and vomiting)     Psychiatric problem     anxiety and depression    Rheumatoid aortitis     Rheumatoid arthritis(714.0) 2003    SMAS (superior mesenteric artery syndrome) (HCC)     Substance abuse (HCC)        SURGICAL HISTORY  Past Surgical History:   Procedure Laterality Date    NM UPPER GI ENDOSCOPY,DIAGNOSIS N/A 9/9/2022    Procedure: ENDOSCOPIC ULTRASOUND- UPPER;  Surgeon: Jorge Brooke M.D.;  Location: [facility];  Service: EUS    EGD W/ENDOSCOPIC ULTRASOUND N/A 9/9/2022    Procedure: EGD, WITH ENDOSCOPIC US;  Surgeon: Jorge Brooke M.D.;  Location: [facility];  Service: EUS    NM ENDOSCOPIC US EXAM, ESOPH  4/29/2020    Procedure: EGD, WITH ENDOSCOPIC US;  Surgeon: Jorge Brooke M.D.;  Location: [facility];  Service: Gastroenterology    GASTROSCOPY-ENDO  4/29/2020    Procedure: GASTROSCOPY;  Surgeon: Jorge Brooke M.D.;  Location: [facility];  Service: Gastroenterology    EGD WITH ASP/BX  4/29/2020    Procedure: EGD, WITH ASPIRATION BIOPSY - POSS FNA;  Surgeon: Jorge Brooke M.D.;  Location: [facility];  Service: Gastroenterology    NM EXPLORATORY OF ABDOMEN N/A 10/4/2019    Procedure: LAPAROTOMY, EXPLORATORY AND REPAIR OF DUODENAL PERFORATION;  Surgeon: James Dumont M.D.;   Location: [facility];  Service: General    COLONOSCOPY  2018    with upper endoscopy    FINGER ARTHROPLASTY Right 6/5/2017    Procedure: FINGER ARTHROPLASTY - LONG, RING AND SMALL VOLAR PLATE;  Surgeon: Lobo Rosen M.D.;  Location: SURGERY SAME DAY [facility];  Service:     FINGER AMPUTATION  6/5/2017    Procedure: FINGER AMPUTATION - LONG, RING AND SMALL AT THE PROXIMAL INTERPHALANGEAL JOINT;  Surgeon: Lobo Rosen M.D.;  Location: SURGERY SAME DAY [facility];  Service:     FINGER ARTHROPLASTY Right 4/17/2017    Procedure: FINGER ARTHROPLASTY ;  Surgeon: Lobo Rosen M.D.;  Location: SURGERY SAME DAY [facility];  Service:     FINGER AMPUTATION Right 9/14/2016    Procedure: FINGER AMPUTATION INDEX;  Surgeon: Lobo Rosen M.D.;  Location: SURGERY SAME DAY [facility];  Service:     IRRIGATION & DEBRIDEMENT ORTHO Right 9/11/2016    Procedure: IRRIGATION & DEBRIDEMENT ORTHO - right index finger;  Surgeon: Madhu Rosen M.D.;  Location: [facility];  Service:     FUSION, PIP JOINT, TOE Right 8/29/2016    Procedure: RE-DO INDEX FINGER PROXIMAL INTERPHALANGEAL ARTHRODESIS;  Surgeon: Lobo Rosen M.D.;  Location: SURGERY SAME DAY [facility];  Service:     BONE GRAFT Right 8/29/2016    Procedure: BONE GRAFT - DISTAL RADIUS ;  Surgeon: Lobo Rosen M.D.;  Location: SURGERY SAME DAY [facility];  Service:     ARTHRODESIS Right 5/6/2016    Procedure: ARTHRODESIS INDEX FINGER PROXIMAL INTERPHALANGEAL;  Surgeon: Lobo Rosen M.D.;  Location: SURGERY SAME DAY [facility];  Service:     FOOT RECONSTRUCTION RHEUMATIC Left 7/29/2015    Procedure: FOOT RECONSTRUCTION RHEUMATIC;  Surgeon: Heriberto Alves M.D.;  Location: SURGERY [facility];  Service:     TOE FUSION Right 5/27/2015    Procedure: TOE FUSION 1ST METATARSALPHALANGEAL JOINT;  Surgeon: Heriberto Alves M.D.;  Location: [facility];  Service:     BONE SPUR  EXCISION  5/27/2015    Procedure: BONE SPUR EXCISION METATARSAL HEAD 2-3;  Surgeon: Heriberto Alves M.D.;  Location: SURGERY [facility];  Service:     HAMMERTOE CORRECTION  5/27/2015    Procedure: HAMMERTOE CORRECTION AND SOFT TISSUE RE-ALINGMENT 2-3 ;  Surgeon: Heriberto Alves M.D.;  Location: SURGERY [facility];  Service:     DENTAL EXTRACTION(S)  2014    all of upper teeth    ABDOMINAL ABSCESS DRAINAGE  9/27/2011    Performed by VERO WRIGHT at SURGERY [facility]    EMANUEL BY LAPAROSCOPY  9/27/2011    Performed by VERO WRIGHT at SURGERY [facility]    APPENDECTOMY  2011    Found out it had ruptured prior to abcess surgery    REPEAT C SECTION  2008    REPEAT C SECTION  2005    REPEAT C SECTION  1999    PRIMARY C SECTION  1991    TONSILLECTOMY  1982    WRIST EXPLORATION Left 1980's    fractured wrist-no hardware       FAMILY HISTORY  Family History   Problem Relation Age of Onset    Cancer Mother     Heart Disease Mother     Hypertension Mother     Heart Disease Father     Hypertension Father        SOCIAL HISTORY   reports that she has been smoking cigarettes. She has a 30.00 pack-year smoking history. She has never used smokeless tobacco. She reports that she does not drink alcohol and does not use drugs.    CURRENT MEDICATIONS  Previous Medications    CYANOCOBALAMIN (VITAMIN B12) 1000 MCG TAB    Take 1 Tablet by mouth 2 times a day.    FOLIC ACID (FOLVITE) 1 MG TAB    Take 1 Tablet by mouth 2 times a day.    MAGNESIUM OXIDE 400 (240 MG) MG TAB    Take 1 Tablet by mouth every day.    MIDODRINE (PROAMATINE) 10 MG TABLET    Take 1 Tablet by mouth 3 times a day with meals.    OMEPRAZOLE (PRILOSEC) 20 MG DELAYED-RELEASE CAPSULE    Take 1 Capsule by mouth every day.    PAROXETINE (PAXIL) 30 MG TAB    Take 1 Tablet by mouth every evening.    QUETIAPINE (SEROQUEL) 100 MG TAB    Take 1 Tablet by mouth every evening.    SUCRALFATE (CARAFATE) 1 GM TAB    Take 1 Tablet by mouth 4 Times a  "Day,Before Meals and at Bedtime.    THIAMINE (THIAMINE) 100 MG TABLET    Take 1 Tablet by mouth every day.       ALLERGIES  Penicillins, Abilify, Aripiprazole, and Nitrous oxide    PHYSICAL EXAM  /61   Pulse 99   Temp 37.2 °C (99 °F) (Temporal)   Resp 17   Ht 1.6 m (5' 3\")   Wt 46.7 kg (103 lb)   LMP 09/21/2011   SpO2 98%   BMI 18.25 kg/m²   Constitutional: Well developed, Well nourished, Moderate distress, Cachectic.  HENT: Normocephalic, Atraumatic. Dry mucus membranes. Poor dentition.  Eyes: PERRLA, EOMI, Conjunctiva normal, No discharge.   Neck: No tenderness, Supple, No stridor.   Cardiovascular: Normal heart rate, Normal rhythm.   Thorax & Lungs: Clear to auscultation bilaterally, No respiratory distress, No wheezing, No crackles.   Abdomen: Soft, No tenderness, No masses, No pulsatile masses.   Skin: Warm, Dry, No erythema, No rash.    Musculoskeletal: No major deformities noted.  Intact distal pulses  Neurologic: Awake, alert. Moves all extremities spontaneously.  Psychiatric: Affect normal, Judgment normal, Mood normal.        DIAGNOSTIC STUDIES    Labs:   Results for orders placed or performed during the hospital encounter of 02/17/23   CBC with Differential   Result Value Ref Range    WBC 20.0 (H) 4.8 - 10.8 K/uL    RBC 3.54 (L) 4.20 - 5.40 M/uL    Hemoglobin 11.3 (L) 12.0 - 16.0 g/dL    Hematocrit 35.3 (L) 37.0 - 47.0 %    MCV 99.7 (H) 81.4 - 97.8 fL    MCH 31.9 27.0 - 33.0 pg    MCHC 32.0 (L) 33.6 - 35.0 g/dL    RDW 55.1 (H) 35.9 - 50.0 fL    Platelet Count 407 164 - 446 K/uL    MPV 10.9 9.0 - 12.9 fL    Neutrophils-Polys 88.90 (H) 44.00 - 72.00 %    Lymphocytes 4.90 (L) 22.00 - 41.00 %    Monocytes 3.90 0.00 - 13.40 %    Eosinophils 1.20 0.00 - 6.90 %    Basophils 0.40 0.00 - 1.80 %    Immature Granulocytes 0.70 0.00 - 0.90 %    Nucleated RBC 0.00 /100 WBC    Neutrophils (Absolute) 17.80 (H) 2.00 - 7.15 K/uL    Lymphs (Absolute) 0.97 (L) 1.00 - 4.80 K/uL    Monos (Absolute) 0.78 0.00 - " 0.85 K/uL    Eos (Absolute) 0.23 0.00 - 0.51 K/uL    Baso (Absolute) 0.08 0.00 - 0.12 K/uL    Immature Granulocytes (abs) 0.14 (H) 0.00 - 0.11 K/uL    NRBC (Absolute) 0.00 K/uL   Complete Metabolic Panel (CMP)   Result Value Ref Range    Sodium 138 135 - 145 mmol/L    Potassium 3.5 (L) 3.6 - 5.5 mmol/L    Chloride 113 (H) 96 - 112 mmol/L    Co2 12 (L) 20 - 33 mmol/L    Anion Gap 13.0 7.0 - 16.0    Glucose 91 65 - 99 mg/dL    Bun 13 8 - 22 mg/dL    Creatinine 0.65 0.50 - 1.40 mg/dL    Calcium 8.4 (L) 8.5 - 10.5 mg/dL    AST(SGOT) 14 12 - 45 U/L    ALT(SGPT) 9 2 - 50 U/L    Alkaline Phosphatase 112 (H) 30 - 99 U/L    Total Bilirubin <0.2 0.1 - 1.5 mg/dL    Albumin 3.3 3.2 - 4.9 g/dL    Total Protein 6.8 6.0 - 8.2 g/dL    Globulin 3.5 1.9 - 3.5 g/dL    A-G Ratio 0.9 g/dL   Troponins NOW   Result Value Ref Range    Troponin T 9 6 - 19 ng/L   Troponins in two (2) hours   Result Value Ref Range    Troponin T 8 6 - 19 ng/L   HCG Qual Serum   Result Value Ref Range    Beta-Hcg Qualitative Serum Negative Negative   LACTIC ACID   Result Value Ref Range    Lactic Acid 1.6 0.5 - 2.0 mmol/L   URINALYSIS    Specimen: Urine   Result Value Ref Range    Color Yellow     Character Cloudy (A)     Specific Gravity 1.014 <1.035    Ph 6.5 5.0 - 8.0    Glucose Negative Negative mg/dL    Ketones Negative Negative mg/dL    Protein 30 (A) Negative mg/dL    Bilirubin Negative Negative    Urobilinogen, Urine 0.2 Negative    Nitrite Negative Negative    Leukocyte Esterase Small (A) Negative    Occult Blood Negative Negative    Micro Urine Req Microscopic    CoV-2, FLU A/B, and RSV by PCR (2-4 Hours CEPHEID) : Collect NP swab in VTM    Specimen: Respirate   Result Value Ref Range    Influenza virus A RNA Negative Negative    Influenza virus B, PCR Negative Negative    RSV, PCR Negative Negative    SARS-CoV-2 by PCR NotDetected     SARS-CoV-2 Source NP Swab    CORRECTED CALCIUM   Result Value Ref Range    Correct Calcium 9.0 8.5 - 10.5 mg/dL    ESTIMATED GFR   Result Value Ref Range    GFR (CKD-EPI) 107 >60 mL/min/1.73 m 2   D-DIMER   Result Value Ref Range    D-Dimer 8.27 (H) 0.00 - 0.50 ug/mL (FEU)   URINE MICROSCOPIC (W/UA)   Result Value Ref Range    WBC 10-20 (A) /hpf    RBC 0-2 /hpf    Bacteria Many (A) None /hpf    Epithelial Cells Many (A) /hpf    Hyaline Cast 0-2 /lpf   EKG   Result Value Ref Range    Report       [facility] Emergency Dept.    Test Date:  2023  Pt Name:    [facility]                Department: ER  MRN:        [id number]                      Room:       [facility]  Gender:     Female                       Technician: [id number]  :        1970                   Requested By:ER TRIAGE PROTOCOL  Order #:    [id number]                    Reading MD: ROSHNI IVRK MD    Measurements  Intervals                                Axis  Rate:       94                           P:          82  ME:         220                          QRS:        87  QRSD:       113                          T:          59  QT:         375  QTc:        469    Interpretive Statements  Sinus rhythm  Prolonged ME interval  NIESHA, consider biatrial enlargement  Borderline intraventricular conduction delay  Borderline T abnormalities, anterior leads  Compared to ECG 2023 04:10:10  No significant changes  Electronically Signed On 2023 16:14:55 PST by ROSHNI VIRK MD     EKG   Result Value Ref Range    Report       [facility] Emergency Dept.    Test Date:  2023  Pt Name:    STEFFANIE SANDERSLINS                Department: ER  MRN:        [id number]                      Room:       [facility]  Gender:     Female                       Technician:  :        1970                   Requested By:ROSHNI VIRK  Order #:    [id number]                    Reading MD: ROSHNI VIRK MD    Measurements  Intervals                                Axis  Rate:       75                           P:           77  PA:         197                          QRS:        88  QRSD:       112                          T:          81  QT:         422  QTc:        472    Interpretive Statements  Sinus rhythm  Borderline intraventricular conduction delay  Low voltage, precordial leads  Compared to ECG 02/17/2023 15:48:43  Low QRS voltage now present  First degree AV block no longer present  T-wave abnormality no longer present  Electronically Signed On 2- 18:39:42 PST by ROSHNI VIRK MD           EKG:   I have independently interpreted this EKG  12 Lead EKG: interpreted by me as above.      Radiology:   The attending emergency physician has independently interpreted the diagnostic imaging associated with this visit and am waiting the final reading from the radiologist.     DX-CHEST-PORTABLE (1 VIEW)   Final Result      No acute cardiopulmonary disease.      CT-CTA CHEST PULMONARY ARTERY W/ RECONS    (Results Pending)   CT-ABDOMEN-PELVIS WITH    (Results Pending)      Preliminary interpretation is a follows: No pneumonia  Radiologist interpretation: Noted above.    COURSE & MEDICAL DECISION MAKING     ED Observation Status? No; Patient does not meet criteria for ED Observation.     4:14 PM - Patient seen and examined at bedside. Discussed plan of care, including ordering lab work and imaging. Patient agrees to the plan of care. The patient will be medicated with NS infusion 1000 mL. Ordered for EKG, HCG qual serum, Troponin, CMP, CBC with differential, Blood culture, Urine culture, UA, Lactic acid, and DX-Chest to evaluate her symptoms.  Differential diagnoses include but not limited to: Dehydration vs. Syncope vs. Sepsis.    HYDRATION: Based on the patient's presentation of Hypotension the patient was given IV fluids. IV Hydration was used because oral hydration was not adequate alone. Upon recheck following hydration, the patient was improved.     INITIAL ASSESSMENT, COURSE AND PLAN  Care Narrative: Patient is coming  in after syncopal episode, the patient reports feeling chills today.  The patient is hypotensive, received some IV fluids.  We fluid resuscitated the patient.  The patient has a known history of anorexia and protein calorie malnutrition.  The patient also has a history of hypotension, recent hospitalization.  After 2 L of fluid the patient is still hypotensive we will put the patient on her daily Midrin known, while the patient was here she started complaining of increasing chest pain, repeat EKG was unremarkable.  The patient's lactic acid is normal, the patient does have a leukocytosis, give the patient IV antibiotics.  I got a D-dimer that was positive, I have ordered for the patient to have a CT scan of the chest abdomen pelvis to determine if the patient has a pulmonary embolism, already give the patient antibiotics, the patient blood pressure is still low, will discuss the case with intensivist for possible [facility].      CRITICAL CARE  The very real possibilty of a deterioration of this patient's condition required the highest level of my preparedness for sudden, emergent intervention.  I provided critical care services, which included medication orders, frequent reevaluations of the patient's condition and response to treatment, ordering and reviewing test results, and discussing the case with various consultants.  The critical care time associated with the care of the patient was 35 minutes. Review chart for interventions. This time is exclusive of any other billable procedures.       DISPOSITION AND DISCUSSIONS  I have discussed management of the patient with the following physicians and HALIMA's: Intensivist    FINAL DIANGOSIS  1. Syncope, unspecified syncope type    2. Dehydration    3. Chest pain, unspecified type    4. Epigastric pain    5. Acute cystitis without hematuria    6.  Sepsis       The note accurately reflects work and decisions made by me.  Yinka Lazo M.D.  2/17/2023  8:14  PM     IBenjy (Scribe), am scribing for, and in the presence of, Yinka Lazo M.D..    Electronically signed by: Benjy Sebastian (Ruby), 2/17/2023    IYinka M.D. personally performed the services described in this documentation, as scribed by Benjy Sebastian in my presence, and it is both accurate and complete.

## 2023-02-18 PROBLEM — Z66 DNR (DO NOT RESUSCITATE): Status: ACTIVE | Noted: 2022-02-21

## 2023-02-18 PROBLEM — N39.0 ACUTE UTI: Status: ACTIVE | Noted: 2023-02-18

## 2023-02-18 PROBLEM — G62.9 NEUROPATHY: Status: ACTIVE | Noted: 2023-02-18

## 2023-02-18 PROBLEM — R65.20 SEVERE SEPSIS (HCC): Status: ACTIVE | Noted: 2023-02-18

## 2023-02-18 PROBLEM — N30.00 ACUTE CYSTITIS WITHOUT HEMATURIA: Status: ACTIVE | Noted: 2023-02-18

## 2023-02-18 PROBLEM — A41.9 SEVERE SEPSIS (HCC): Status: ACTIVE | Noted: 2023-02-18

## 2023-02-18 LAB
ALBUMIN SERPL BCP-MCNC: 3.1 G/DL (ref 3.2–4.9)
ALBUMIN/GLOB SERPL: 1 G/DL
ALP SERPL-CCNC: 220 U/L (ref 30–99)
ALT SERPL-CCNC: 84 U/L (ref 2–50)
ANION GAP SERPL CALC-SCNC: 10 MMOL/L (ref 7–16)
AST SERPL-CCNC: 159 U/L (ref 12–45)
B-OH-BUTYR SERPL-MCNC: 0.15 MMOL/L (ref 0.02–0.27)
BILIRUB SERPL-MCNC: 0.2 MG/DL (ref 0.1–1.5)
BUN SERPL-MCNC: 11 MG/DL (ref 8–22)
CALCIUM ALBUM COR SERPL-MCNC: 8.7 MG/DL (ref 8.5–10.5)
CALCIUM SERPL-MCNC: 8 MG/DL (ref 8.5–10.5)
CHLORIDE SERPL-SCNC: 113 MMOL/L (ref 96–112)
CO2 SERPL-SCNC: 14 MMOL/L (ref 20–33)
CREAT SERPL-MCNC: 0.53 MG/DL (ref 0.5–1.4)
GFR SERPLBLD CREATININE-BSD FMLA CKD-EPI: 112 ML/MIN/1.73 M 2
GLOBULIN SER CALC-MCNC: 3 G/DL (ref 1.9–3.5)
GLUCOSE SERPL-MCNC: 96 MG/DL (ref 65–99)
LACTATE SERPL-SCNC: 1.1 MMOL/L (ref 0.5–2)
MAGNESIUM SERPL-MCNC: 1.7 MG/DL (ref 1.5–2.5)
PHOSPHATE SERPL-MCNC: 2.3 MG/DL (ref 2.5–4.5)
POTASSIUM SERPL-SCNC: 3.1 MMOL/L (ref 3.6–5.5)
PROT SERPL-MCNC: 6.1 G/DL (ref 6–8.2)
SODIUM SERPL-SCNC: 137 MMOL/L (ref 135–145)

## 2023-02-18 PROCEDURE — 700102 HCHG RX REV CODE 250 W/ 637 OVERRIDE(OP): Performed by: STUDENT IN AN ORGANIZED HEALTH CARE EDUCATION/TRAINING PROGRAM

## 2023-02-18 PROCEDURE — 80053 COMPREHEN METABOLIC PANEL: CPT

## 2023-02-18 PROCEDURE — 83735 ASSAY OF MAGNESIUM: CPT

## 2023-02-18 PROCEDURE — 700105 HCHG RX REV CODE 258: Performed by: STUDENT IN AN ORGANIZED HEALTH CARE EDUCATION/TRAINING PROGRAM

## 2023-02-18 PROCEDURE — 84100 ASSAY OF PHOSPHORUS: CPT

## 2023-02-18 PROCEDURE — 700111 HCHG RX REV CODE 636 W/ 250 OVERRIDE (IP): Performed by: STUDENT IN AN ORGANIZED HEALTH CARE EDUCATION/TRAINING PROGRAM

## 2023-02-18 PROCEDURE — 99232 SBSQ HOSP IP/OBS MODERATE 35: CPT | Performed by: STUDENT IN AN ORGANIZED HEALTH CARE EDUCATION/TRAINING PROGRAM

## 2023-02-18 PROCEDURE — A9270 NON-COVERED ITEM OR SERVICE: HCPCS | Performed by: STUDENT IN AN ORGANIZED HEALTH CARE EDUCATION/TRAINING PROGRAM

## 2023-02-18 PROCEDURE — 83605 ASSAY OF LACTIC ACID: CPT

## 2023-02-18 PROCEDURE — 700105 HCHG RX REV CODE 258: Performed by: INTERNAL MEDICINE

## 2023-02-18 PROCEDURE — 700111 HCHG RX REV CODE 636 W/ 250 OVERRIDE (IP): Performed by: EMERGENCY MEDICINE

## 2023-02-18 PROCEDURE — 82010 KETONE BODYS QUAN: CPT

## 2023-02-18 PROCEDURE — 700101 HCHG RX REV CODE 250: Performed by: STUDENT IN AN ORGANIZED HEALTH CARE EDUCATION/TRAINING PROGRAM

## 2023-02-18 PROCEDURE — 770000 HCHG ROOM/CARE - INTERMEDIATE ICU *

## 2023-02-18 RX ORDER — SODIUM CHLORIDE, SODIUM LACTATE, POTASSIUM CHLORIDE, CALCIUM CHLORIDE 600; 310; 30; 20 MG/100ML; MG/100ML; MG/100ML; MG/100ML
INJECTION, SOLUTION INTRAVENOUS CONTINUOUS
Status: DISCONTINUED | OUTPATIENT
Start: 2023-02-18 | End: 2023-02-19 | Stop reason: HOSPADM

## 2023-02-18 RX ORDER — KETOROLAC TROMETHAMINE 30 MG/ML
30 INJECTION, SOLUTION INTRAMUSCULAR; INTRAVENOUS EVERY 6 HOURS
Status: DISCONTINUED | OUTPATIENT
Start: 2023-02-18 | End: 2023-02-19 | Stop reason: HOSPADM

## 2023-02-18 RX ORDER — SODIUM CHLORIDE 9 MG/ML
500 INJECTION, SOLUTION INTRAVENOUS ONCE
Status: COMPLETED | OUTPATIENT
Start: 2023-02-18 | End: 2023-02-18

## 2023-02-18 RX ORDER — MORPHINE SULFATE 4 MG/ML
4 INJECTION INTRAVENOUS
Status: DISCONTINUED | OUTPATIENT
Start: 2023-02-18 | End: 2023-02-19 | Stop reason: HOSPADM

## 2023-02-18 RX ORDER — QUETIAPINE FUMARATE 100 MG/1
100 TABLET, FILM COATED ORAL EVERY EVENING
Status: DISCONTINUED | OUTPATIENT
Start: 2023-02-18 | End: 2023-02-19 | Stop reason: HOSPADM

## 2023-02-18 RX ORDER — ONDANSETRON 2 MG/ML
4 INJECTION INTRAMUSCULAR; INTRAVENOUS EVERY 4 HOURS PRN
Status: DISCONTINUED | OUTPATIENT
Start: 2023-02-18 | End: 2023-02-19 | Stop reason: HOSPADM

## 2023-02-18 RX ORDER — CAPSAICIN 0.025 %
CREAM (GRAM) TOPICAL 3 TIMES DAILY
Status: DISCONTINUED | OUTPATIENT
Start: 2023-02-18 | End: 2023-02-19 | Stop reason: HOSPADM

## 2023-02-18 RX ORDER — OXYCODONE HYDROCHLORIDE 5 MG/1
5 TABLET ORAL
Status: DISCONTINUED | OUTPATIENT
Start: 2023-02-18 | End: 2023-02-19 | Stop reason: HOSPADM

## 2023-02-18 RX ORDER — SODIUM CHLORIDE, SODIUM LACTATE, POTASSIUM CHLORIDE, AND CALCIUM CHLORIDE .6; .31; .03; .02 G/100ML; G/100ML; G/100ML; G/100ML
500 INJECTION, SOLUTION INTRAVENOUS ONCE
Status: COMPLETED | OUTPATIENT
Start: 2023-02-18 | End: 2023-02-18

## 2023-02-18 RX ORDER — MAGNESIUM SULFATE HEPTAHYDRATE 40 MG/ML
4 INJECTION, SOLUTION INTRAVENOUS ONCE
Status: COMPLETED | OUTPATIENT
Start: 2023-02-18 | End: 2023-02-18

## 2023-02-18 RX ORDER — SUCRALFATE 1 G/1
1 TABLET ORAL
Status: DISCONTINUED | OUTPATIENT
Start: 2023-02-18 | End: 2023-02-19 | Stop reason: HOSPADM

## 2023-02-18 RX ORDER — OXYCODONE HYDROCHLORIDE 10 MG/1
10 TABLET ORAL
Status: DISCONTINUED | OUTPATIENT
Start: 2023-02-18 | End: 2023-02-19 | Stop reason: HOSPADM

## 2023-02-18 RX ORDER — IBUPROFEN 400 MG/1
800 TABLET ORAL 3 TIMES DAILY PRN
Status: DISCONTINUED | OUTPATIENT
Start: 2023-02-21 | End: 2023-02-19 | Stop reason: HOSPADM

## 2023-02-18 RX ORDER — POTASSIUM CHLORIDE 20 MEQ/1
40 TABLET, EXTENDED RELEASE ORAL EVERY 6 HOURS
Status: COMPLETED | OUTPATIENT
Start: 2023-02-18 | End: 2023-02-18

## 2023-02-18 RX ADMIN — SUCRALFATE 1 G: 1 TABLET ORAL at 20:23

## 2023-02-18 RX ADMIN — ENOXAPARIN SODIUM 40 MG: 40 INJECTION SUBCUTANEOUS at 17:17

## 2023-02-18 RX ADMIN — MORPHINE SULFATE 2 MG: 4 INJECTION, SOLUTION INTRAMUSCULAR; INTRAVENOUS at 07:29

## 2023-02-18 RX ADMIN — SODIUM CHLORIDE, POTASSIUM CHLORIDE, SODIUM LACTATE AND CALCIUM CHLORIDE 500 ML: 600; 310; 30; 20 INJECTION, SOLUTION INTRAVENOUS at 16:00

## 2023-02-18 RX ADMIN — OXYCODONE 5 MG: 5 TABLET ORAL at 21:38

## 2023-02-18 RX ADMIN — KETOROLAC TROMETHAMINE 30 MG: 30 INJECTION, SOLUTION INTRAMUSCULAR; INTRAVENOUS at 11:48

## 2023-02-18 RX ADMIN — NICOTINE TRANSDERMAL SYSTEM 21 MG: 21 PATCH, EXTENDED RELEASE TRANSDERMAL at 05:45

## 2023-02-18 RX ADMIN — SUCRALFATE 1 G: 1 TABLET ORAL at 17:18

## 2023-02-18 RX ADMIN — ACETAMINOPHEN 650 MG: 325 TABLET, FILM COATED ORAL at 21:38

## 2023-02-18 RX ADMIN — FOLIC ACID 1 MG: 1 TABLET ORAL at 05:45

## 2023-02-18 RX ADMIN — FOLIC ACID 1 MG: 1 TABLET ORAL at 17:18

## 2023-02-18 RX ADMIN — MIDODRINE HYDROCHLORIDE 10 MG: 5 TABLET ORAL at 17:17

## 2023-02-18 RX ADMIN — MORPHINE SULFATE 2 MG: 4 INJECTION, SOLUTION INTRAMUSCULAR; INTRAVENOUS at 02:31

## 2023-02-18 RX ADMIN — SODIUM CHLORIDE 500 ML: 9 INJECTION, SOLUTION INTRAVENOUS at 20:46

## 2023-02-18 RX ADMIN — MIDODRINE HYDROCHLORIDE 10 MG: 5 TABLET ORAL at 07:29

## 2023-02-18 RX ADMIN — PAROXETINE HYDROCHLORIDE 30 MG: 20 TABLET, FILM COATED ORAL at 17:18

## 2023-02-18 RX ADMIN — QUETIAPINE FUMARATE 100 MG: 100 TABLET ORAL at 18:33

## 2023-02-18 RX ADMIN — ONDANSETRON 4 MG: 2 INJECTION INTRAMUSCULAR; INTRAVENOUS at 14:18

## 2023-02-18 RX ADMIN — OXYCODONE 5 MG: 5 TABLET ORAL at 16:32

## 2023-02-18 RX ADMIN — MIDODRINE HYDROCHLORIDE 10 MG: 5 TABLET ORAL at 10:48

## 2023-02-18 RX ADMIN — SODIUM CHLORIDE, POTASSIUM CHLORIDE, SODIUM LACTATE AND CALCIUM CHLORIDE: 600; 310; 30; 20 INJECTION, SOLUTION INTRAVENOUS at 13:47

## 2023-02-18 RX ADMIN — CEFTRIAXONE SODIUM 1000 MG: 10 INJECTION, POWDER, FOR SOLUTION INTRAVENOUS at 18:33

## 2023-02-18 RX ADMIN — OXYCODONE HYDROCHLORIDE 10 MG: 10 TABLET ORAL at 10:48

## 2023-02-18 RX ADMIN — THIAMINE HCL TAB 100 MG 100 MG: 100 TAB at 05:45

## 2023-02-18 RX ADMIN — Medication 400 MG: at 05:45

## 2023-02-18 RX ADMIN — POTASSIUM CHLORIDE 40 MEQ: 1500 TABLET, EXTENDED RELEASE ORAL at 11:47

## 2023-02-18 RX ADMIN — MAGNESIUM SULFATE HEPTAHYDRATE 4 G: 40 INJECTION, SOLUTION INTRAVENOUS at 09:10

## 2023-02-18 RX ADMIN — SUCRALFATE 1 G: 1 TABLET ORAL at 11:47

## 2023-02-18 RX ADMIN — KETOROLAC TROMETHAMINE 30 MG: 30 INJECTION, SOLUTION INTRAMUSCULAR; INTRAVENOUS at 17:18

## 2023-02-18 RX ADMIN — SODIUM CHLORIDE 500 ML: 9 INJECTION, SOLUTION INTRAVENOUS at 18:13

## 2023-02-18 RX ADMIN — OMEPRAZOLE 20 MG: 20 CAPSULE, DELAYED RELEASE ORAL at 05:45

## 2023-02-18 RX ADMIN — POTASSIUM CHLORIDE 40 MEQ: 1500 TABLET, EXTENDED RELEASE ORAL at 17:18

## 2023-02-18 RX ADMIN — CYANOCOBALAMIN TAB 500 MCG 1000 MCG: 500 TAB at 05:44

## 2023-02-18 ASSESSMENT — PAIN DESCRIPTION - PAIN TYPE
TYPE: CHRONIC PAIN
TYPE: CHRONIC PAIN
TYPE: ACUTE PAIN;CHRONIC PAIN
TYPE: CHRONIC PAIN
TYPE: ACUTE PAIN;CHRONIC PAIN
TYPE: CHRONIC PAIN
TYPE: ACUTE PAIN;CHRONIC PAIN
TYPE: CHRONIC PAIN
TYPE: ACUTE PAIN;CHRONIC PAIN

## 2023-02-18 ASSESSMENT — COGNITIVE AND FUNCTIONAL STATUS - GENERAL
EATING MEALS: A LITTLE
PERSONAL GROOMING: A LOT
DRESSING REGULAR LOWER BODY CLOTHING: A LITTLE
WALKING IN HOSPITAL ROOM: A LITTLE
MOBILITY SCORE: 17
CLIMB 3 TO 5 STEPS WITH RAILING: A LOT
SUGGESTED CMS G CODE MODIFIER MOBILITY: CK
STANDING UP FROM CHAIR USING ARMS: A LITTLE
MOVING FROM LYING ON BACK TO SITTING ON SIDE OF FLAT BED: A LITTLE
TOILETING: A LITTLE
DAILY ACTIVITIY SCORE: 17
TURNING FROM BACK TO SIDE WHILE IN FLAT BAD: A LITTLE
MOVING TO AND FROM BED TO CHAIR: A LITTLE
SUGGESTED CMS G CODE MODIFIER DAILY ACTIVITY: CK
DRESSING REGULAR UPPER BODY CLOTHING: A LITTLE
HELP NEEDED FOR BATHING: A LITTLE

## 2023-02-18 ASSESSMENT — ENCOUNTER SYMPTOMS
BRUISES/BLEEDS EASILY: 0
NAUSEA: 0
HEMOPTYSIS: 0
VOMITING: 0
SHORTNESS OF BREATH: 0
ABDOMINAL PAIN: 0
CHILLS: 0
PALPITATIONS: 0
DEPRESSION: 0
INSOMNIA: 0
MYALGIAS: 0
DOUBLE VISION: 0
HEADACHES: 0
HEARTBURN: 0
COUGH: 0
DIZZINESS: 1
FEVER: 0
ABDOMINAL PAIN: 1
BLURRED VISION: 0
NECK PAIN: 0
NERVOUS/ANXIOUS: 0

## 2023-02-18 ASSESSMENT — FIBROSIS 4 INDEX: FIB4 SCORE: 2.13

## 2023-02-18 NOTE — PROGRESS NOTES
4 Eyes Skin Assessment Completed by LEANNE Smalls and LEANNE Tanner.    Head WDL  Ears WDL  Nose WDL  Mouth WDL  Neck WDL  Breast/Chest WDL  Shoulder Blades WDL  Spine WDL  (R) Arm/Elbow/Hand Missing fingers  (L) Arm/Elbow/Hand Missing fingers  Abdomen Scar  Groin WDL  Scrotum/Coccyx/Buttocks WDL  (R) Leg WDL  (L) Leg WDL  (R) Heel/Foot/Toe Dry, cracked, calloused   (L) Heel/Foot/Toe Dry, cracked, calloused           Devices In Places Tele Box, Blood Pressure Cuff, and Pulse Ox      Interventions In Place Gray Ear Foams, Pillows, and Low Air Loss Mattress    Possible Skin Injury No    Pictures Uploaded Into Epic N/A  Wound Consult Placed N/A  RN Wound Prevention Protocol Ordered No

## 2023-02-18 NOTE — PROGRESS NOTES
Monitor Summary:  Rhythm & Rate: Sinus Rhytm / sinus tach with a 1st degree AVB,  bpm    12hr chart check completed

## 2023-02-18 NOTE — ASSESSMENT & PLAN NOTE
Patient has a history of hypertension likely from severe protein calorie malnutrition.  Continues to have very soft blood pressures with sepsis.    I have started the patient on maintenance fluids LR at 100 mL/h.  I given her additional 500 mL bolus throughout the day.    Continue IV fluid boluses as needed to keep MAP greater than 65 and midodrine 10 mg 3 times a day

## 2023-02-18 NOTE — CARE PLAN
The patient is Watcher - Medium risk of patient condition declining or worsening    Shift Goals  Clinical Goals: Stable hemodynamics  Patient Goals: Drink water  Family Goals: NAIN    Progress made toward(s) clinical / shift goals:    Problem: Pain - Standard  Goal: Alleviation of pain or a reduction in pain to the patient’s comfort goal  Outcome: Progressing     Problem: Knowledge Deficit - Standard  Goal: Patient and family/care givers will demonstrate understanding of plan of care, disease process/condition, diagnostic tests and medications  Outcome: Progressing     Problem: Skin Integrity  Goal: Skin integrity is maintained or improved  Outcome: Progressing     Problem: Hemodynamics  Goal: Patient's hemodynamics, fluid balance and neurologic status will be stable or improve  Outcome: Progressing     Problem: Fluid Volume  Goal: Fluid volume balance will be maintained  Outcome: Progressing     Problem: Respiratory  Goal: Patient will achieve/maintain optimum respiratory ventilation and gas exchange  Outcome: Progressing     Problem: Physical Regulation  Goal: Diagnostic test results will improve  Outcome: Progressing  Goal: Signs and symptoms of infection will decrease  Outcome: Progressing

## 2023-02-18 NOTE — PROGRESS NOTES
[facility] Medicine Daily Progress Note    Date of Service  2/18/2023    Chief Complaint  Mary Martinez is a 52 y.o. female admitted 2/17/2023 with sepsis due to recurrent urinary tract infection.    Hospital Course  Mary Martinez is a 52 y.o. female who presented on 2/17/2023 with dizziness, syncope, and abdominal pain.  This is a pleasant woman with a history of recurrent urinary tract infections, severe rheumatoid arthritis status post amputation of fingers on the right hand, tobacco use, duodenal perforation in 2019, and previous cholecystectomy, chronic narcotic use off of narcotics for several months, and depression.  She was recently admitted for starvation ketosis.  She was noted to be eating only 25 to 50% of the meals at the time.  She did not want to go to a skilled nursing facility to continue tube feeds and was discharged.    In the emergency room, patient was noted to be hypotensive requiring multiple IV fluid boluses.  With improvement of her blood pressures.  White count was 20.0 and the patient was tachycardic with heart rate into the 100s meeting criteria for sepsis.  Blood pressures dropped down to the 80s over 40s.  Urinalysis showed many bacteria with small leukocyte Estrace.  Patient  was admitted for further management of sepsis due to urinary tract infection.    Interval Problem Update  2/18: Patient's blood pressures stabilizing overnight.  In the afternoon, decreasing again to 69/44 requiring 500 mL bolus.  Mental status remained stable.  Patient notes that she wipes forward instead of in reverse fashion after urinating because of her rheumatoid arthritis she is not able to reach her back side.  Likely cause of patient's recurrent urinary tract infections.  Continues on midodrine 10 mg 3 times a day.  I have ordered cortisol level to evaluate for adrenal insufficiency as well as lactic acids for further evaluation of her sepsis.  Potassium of 3.1 and magnesium 1.7 was  replaced.    I have discussed this patient's plan of care and discharge plan at IDT rounds today with Case Management, Nursing, Nursing leadership, and other members of the IDT team.    Consultants/Specialty  None    Code Status  DNAR/DNI    Disposition  Patient is not medically cleared for discharge.   Anticipate discharge to to home with close outpatient follow-up.  I have placed the appropriate orders for post-discharge needs.    Review of Systems  Review of Systems   Constitutional:  Positive for malaise/fatigue. Negative for chills and fever.   Respiratory:  Negative for cough and shortness of breath.    Cardiovascular:  Negative for chest pain and palpitations.   Gastrointestinal:  Negative for abdominal pain, nausea and vomiting.   Genitourinary:  Positive for dysuria. Negative for hematuria.   Musculoskeletal:  Positive for joint pain. Negative for myalgias.   Neurological:  Positive for dizziness. Negative for headaches.   Psychiatric/Behavioral:  The patient is not nervous/anxious and does not have insomnia.       Physical Exam  Temp:  [36.8 °C (98.2 °F)-37.7 °C (99.9 °F)] 37.1 °C (98.7 °F)  Pulse:  [] 100  Resp:  [12-60] 20  BP: ()/(45-63) 94/60  SpO2:  [86 %-100 %] 96 %    Physical Exam  Vitals and nursing note reviewed.   Constitutional:       Appearance: She is cachectic. She is ill-appearing. She is not toxic-appearing or diaphoretic.   HENT:      Head: Normocephalic and atraumatic.      Mouth/Throat:      Mouth: Mucous membranes are moist.      Pharynx: Oropharynx is clear. No oropharyngeal exudate.   Eyes:      General:         Right eye: No discharge.         Left eye: No discharge.      Conjunctiva/sclera: Conjunctivae normal.      Pupils: Pupils are equal, round, and reactive to light.   Cardiovascular:      Rate and Rhythm: Normal rate and regular rhythm.      Pulses: Normal pulses.      Heart sounds: Normal heart sounds. No murmur heard.  Pulmonary:      Effort: Pulmonary effort is  normal. No respiratory distress.      Breath sounds: Normal breath sounds.   Abdominal:      General: Abdomen is flat. Bowel sounds are normal. There is no distension.      Palpations: Abdomen is soft.      Tenderness: There is no abdominal tenderness.   Musculoskeletal:      Cervical back: Neck supple. No tenderness.      Right lower leg: No edema.      Left lower leg: No edema.   Skin:     Capillary Refill: Capillary refill takes more than 3 seconds.   Neurological:      Mental Status: She is alert and oriented to person, place, and time.      Motor: No weakness.   Psychiatric:         Thought Content: Thought content normal.         Judgment: Judgment normal.       Fluids    Intake/Output Summary (Last 24 hours) at 2/18/2023 1141  Last data filed at 2/18/2023 1043  Gross per 24 hour   Intake 4900 ml   Output 700 ml   Net 4200 ml       Laboratory  Recent Labs     02/17/23  1553   WBC 20.0*   RBC 3.54*   HEMOGLOBIN 11.3*   HEMATOCRIT 35.3*   MCV 99.7*   MCH 31.9   MCHC 32.0*   RDW 55.1*   PLATELETCT 407   MPV 10.9     Recent Labs     02/17/23  1553 02/18/23  0142   SODIUM 138 137   POTASSIUM 3.5* 3.1*   CHLORIDE 113* 113*   CO2 12* 14*   GLUCOSE 91 96   BUN 13 11   CREATININE 0.65 0.53   CALCIUM 8.4* 8.0*                   Imaging  CT-CTA CHEST PULMONARY ARTERY W/ RECONS   Final Result         1.  No pulmonary embolus appreciated.   2.  Linear densities the bilateral lung bases favor changes of atelectasis.   3.  Right pulmonary nodule, see nodule follow-up recommendations below.      Fleischner Society pulmonary nodule recommendations:      Low Risk: No routine follow-up      High Risk: Optional CT at 12 months      Comments: Nodules less than 6 mm do not require routine follow-up, but certain patients at high risk with suspicious nodule morphology, upper lobe location, or both may warrant 12-month follow-up.      Low Risk - Minimal or absent history of smoking and of other known risk factors.      High Risk -  History of smoking or of other known risk factors.      Note: These recommendations do not apply to lung cancer screening, patients with immunosuppression, or patients with known primary cancer.      Fleischner Society 2017 Guidelines for Management of Incidentally Detected Pulmonary Nodules in Adults         CT-ABDOMEN-PELVIS WITH   Final Result         1.  Mild intrahepatic and extra hepatic biliary ductal dilatation and pancreatic ductal dilatation, commonly associated with postcholecystectomy physiology, consider causes of biliary obstruction with additional workup as clinically appropriate.   2.  Punctate bilateral renal calculi without visualized obstructive changes   3.  Hepatomegaly      DX-CHEST-PORTABLE (1 VIEW)   Final Result      No acute cardiopulmonary disease.           Assessment/Plan  * Severe sepsis (HCC)- (present on admission)  Assessment & Plan  This is Severe Sepsis Present on admission  SIRS criteria identified on my evaluation include: Tachycardia, with heart rate greater than 90 BPM and Leukocytosis, with WBC greater than 12,000  Clinical indicators of end organ dysfunction include Systolic blood pressure (SBP) <90 mmHg or mean arterial pressure <65 mmHg, transaminitis  Source is urinary tract  Sepsis protocol initiated  Crystalloid Fluid Administration: Fluid resuscitation ordered per standard protocol - 30 mL/kg per current or ideal body weight  IV antibiotics as appropriate for source of sepsis ceftriaxone.  Reassessment: I have reassessed the patient's hemodynamic status    Continue fluid boluses as needed to maintain MAP greater than 65.  I have ordered additional lactic acids for further evaluation.  We will continue ceftriaxone and midodrine for pressor support.    Acute cystitis without hematuria- (present on admission)  Assessment & Plan  Nitrite positive.  Possibly E. coli.    Continue ceftriaxone  Follow-up urine cultures    Hypotension- (present on admission)  Assessment &  Plan  Patient has a history of hypertension likely from severe protein calorie malnutrition.  Continues to have very soft blood pressures with sepsis.    I have started the patient on maintenance fluids LR at 100 mL/h.  I given her additional 500 mL bolus throughout the day.    Continue IV fluid boluses as needed to keep MAP greater than 65 and midodrine 10 mg 3 times a day    Rheumatoid arthritis of hand (HCC)- (present on admission)  Assessment & Plan  Status post amputation in the right hand and severe disease in the left hand making her difficult to wipe on her backside following urination.  Likely cause of patient's recurrent urinary tract infections.    Complaining of pain.  I have ordered IV Toradol, ibuprofen, oxycodone, and IV morphine as needed.    Neuropathy- (present on admission)  Assessment & Plan  She describes paresthesias and pain in the bilateral lower extremities.  Consistent with neuropathy.    Trial capsaicin cream.  Can consider gabapentin if needed.  Concern for malnutrition contributing.  Check vitamin B12 and folate levels.  Start multivitamin supplementation.    Bipolar 1 disorder (HCC)- (present on admission)  Assessment & Plan  As per history.    Continue with home Paxil and Seroquel    Severe protein-calorie malnutrition (HCC)- (present on admission)  Assessment & Plan  Body mass index is 19.17 kg/m².    Significant subcutaneous tissue and muscle wasting.  Etiology unclear.  Nutrition consult  Multivitamin supplementation    DNR (do not resuscitate)- (present on admission)  Assessment & Plan  Per previous hospitalist.  Patient is DNR/DNI.    Arthritis, rheumatoid (HCC)  Assessment & Plan  History of     Tobacco dependence- (present on admission)  Assessment & Plan  As per history.     cessation    Macrocytic anemia- (present on admission)  Assessment & Plan  Etiology unclear.  Suspect from malnutrition.    Work-up with ferritin, iron panel, reticulocyte panel, B12, folate, LDH,  TSH         VTE prophylaxis: SCDs/TEDs and enoxaparin ppx    I have performed a physical exam and reviewed and updated ROS and Plan today (2/18/2023). In review of yesterday's note (2/17/2023), there are no changes except as documented above.

## 2023-02-18 NOTE — ED TRIAGE NOTES
Patient BIB [facility] with a complaint of syncopal episode at home. On EMS arrival patient was hypotensive and had severe epigastric pain. Vitals with EMS systolic 70s, pain 10/10. EMS gave 250 ml LR and 25mcg Fentanyl IV.   Patient b.p repeat with EMS was 92/66, temp 101.3.

## 2023-02-18 NOTE — ED NOTES
Patient received from EMS, AAO[facility]  Patient attached with cardia monitor, repeat vital checked.   Blood sample send to lab. EKG done.

## 2023-02-18 NOTE — PROGRESS NOTES
[facility] Acceptance Note    Called by Dr. Lazo for admission to IMCU for sepsis with hypotension.  Patient is awake, alert, speaking in full sentences, and warm.  She denies any urinary symptoms. She should be continued on midodrine.  Will admit to IMCU under the care of the hospitalist.      Zoe Snow MD  Pulmonary and Critical Care Medicine

## 2023-02-18 NOTE — ED NOTES
Pt tried to go to restroom for urine sample but unable to ,pain comes back when she tries to stand up.  Assisted pt back to [place].

## 2023-02-18 NOTE — ASSESSMENT & PLAN NOTE
This is Sepsis Present on admission  SIRS criteria identified on my evaluation include: Tachycardia, with heart rate greater than 90 BPM, Tachypnea, with respirations greater than 20 per minute and Leukocytosis, with WBC greater than 12,000  Source is urosepsis  Sepsis protocol initiated  Fluid resuscitation ordered per protocol  Crystalloid Fluid Administration: Fluid resuscitation ordered per standard protocol - 30 mL/kg per current or ideal body weight  IV antibiotics as appropriate for source of sepsis  Reassessment: I have reassessed the patient's hemodynamic status

## 2023-02-18 NOTE — H&P
Hospital Medicine History & Physical Note    Date of Service  2/17/2023    Primary Care Physician  Fidencio Christopher D.O.    Consultants  critical care    Specialist Names: Dr. SHERICE Snow     Code Status  DNAR/DNI    Chief Complaint  Chief Complaint   Patient presents with    Syncope    Hypotension       History of Presenting Illness  Mary Martinez is a 52 y.o. female past medical history of tobacco abuse, rheumatoid arthritis, depression who presented 2/17/2023 with dizziness, syncope and abdominal pain.  She denies any chest pain or shortness of breath.  Patient reports she was recently admitted for starvation ketoacidosis.  No other relieving or exacerbating factors are noted.  In the ER, she was noted to be hypotensive requiring multiple IV fluid boluses.  Her urinalysis was positive for urinary tract infection.  ICU was consulted and recommended IMCU placement under hospitalist.    I discussed the plan of care with patient and bedside RN.    Review of Systems  Review of Systems   Constitutional:  Negative for chills and fever.   HENT:  Negative for hearing loss and tinnitus.    Eyes:  Negative for blurred vision and double vision.   Respiratory:  Negative for cough and hemoptysis.    Cardiovascular:  Negative for chest pain and palpitations.   Gastrointestinal:  Positive for abdominal pain. Negative for heartburn and nausea.   Genitourinary:  Negative for dysuria and urgency.   Musculoskeletal:  Negative for myalgias and neck pain.   Skin:  Negative for rash.   Neurological:  Positive for dizziness. Negative for headaches.   Endo/Heme/Allergies:  Does not bruise/bleed easily.   Psychiatric/Behavioral:  Negative for depression and suicidal ideas.      Past Medical History   has a past medical history of Acute renal failure (ARF) (HCC), Allergy, Anemia, Anxiety, Arthritis, ASTHMA, Blood transfusion without reported diagnosis, Bowel habit changes, Bronchitis (last approx 2018), Chronic pain (04/24/2020),  Dental disorder, Depression, GERD (gastroesophageal reflux disease), GIB (gastrointestinal bleeding), Head ache, Heart burn, Hiatus hernia syndrome, History of pancreatitis, Hypokalemia, Hyponatremia, Idiopathic chronic pancreatitis (HCC), Indigestion, Intractable nausea and vomiting, Kidney disease, Pain, Pneumonia (10/2019), PONV (postoperative nausea and vomiting), Psychiatric problem, Rheumatoid aortitis, Rheumatoid arthritis(714.0) (2003), SMAS (superior mesenteric artery syndrome) (HCC), and Substance abuse (HCC).    Surgical History   has a past surgical history that includes abdominal abscess drainage (9/27/2011); toe fusion (Right, 5/27/2015); bone spur excision (5/27/2015); hammertoe correction (5/27/2015); foot reconstruction rheumatic (Left, 7/29/2015); arthrodesis (Right, 5/6/2016); fusion, pip joint, toe (Right, 8/29/2016); bone graft (Right, 8/29/2016); irrigation & debridement ortho (Right, 9/11/2016); finger amputation (Right, 9/14/2016); finger arthroplasty (Right, 4/17/2017); finger arthroplasty (Right, 6/5/2017); finger amputation (6/5/2017); pr exploratory of abdomen (N/A, 10/4/2019); tonsillectomy (1982); primary c section (1991); repeat c section (1999); repeat c section (2005); repeat c section (2008); appendectomy (2011); nicholas by laparoscopy (9/27/2011); wrist exploration (Left, 1980's); colonoscopy (2018); dental extraction(s) (2014); pr endoscopic us exam, esoph (4/29/2020); gastroscopy-endo (4/29/2020); egd with asp/bx (4/29/2020); pr upper gi endoscopy,diagnosis (N/A, 9/9/2022); and egd w/endoscopic ultrasound (N/A, 9/9/2022).     Family History  family history includes Cancer in her mother; Heart Disease in her father and mother; Hypertension in her father and mother.   Family history reviewed with patient. There is no family history that is pertinent to the chief complaint.     Social History   reports that she has been smoking cigarettes. She has a 30.00 pack-year smoking history.  She has never used smokeless tobacco. She reports that she does not drink alcohol and does not use drugs.    Allergies  Allergies   Allergen Reactions    Penicillins Anaphylaxis and Hives     Tolerates cephalosporins; reports throat swelling with PCN    Abilify Unspecified     Multiple side effects    Aripiprazole Nausea     Spasms, shaking      Nitrous Oxide Vomiting       Medications  Prior to Admission Medications   Prescriptions Last Dose Informant Patient Reported? Taking?   PARoxetine (PAXIL) 30 MG Tab 2/16/2023 at PM Patient No No   Sig: Take 1 Tablet by mouth every evening.   QUEtiapine (SEROQUEL) 100 MG Tab 2/16/2023 at PM Patient No No   Sig: Take 1 Tablet by mouth every evening.   cyanocobalamin (VITAMIN B12) 1000 MCG Tab 2/17/2023 at AM Patient No No   Sig: Take 1 Tablet by mouth 2 times a day.   folic acid (FOLVITE) 1 MG Tab 2/17/2023 at AM Patient No No   Sig: Take 1 Tablet by mouth 2 times a day.   magnesium oxide 400 (240 Mg) MG Tab 2/17/2023 at AM Patient No No   Sig: Take 1 Tablet by mouth every day.   midodrine (PROAMATINE) 10 MG tablet 2/17/2023 at AFTERNOON Patient No No   Sig: Take 1 Tablet by mouth 3 times a day with meals.   omeprazole (PRILOSEC) 20 MG delayed-release capsule 2/17/2023 at AM Patient No No   Sig: Take 1 Capsule by mouth every day.   sucralfate (CARAFATE) 1 GM Tab >7 DAYS at UNK Patient No No   Sig: Take 1 Tablet by mouth 4 Times a Day,Before Meals and at Bedtime.   thiamine (THIAMINE) 100 MG tablet 2/17/2023 at AM Patient No No   Sig: Take 1 Tablet by mouth every day.      Facility-Administered Medications: None       Physical Exam  Temp:  [36.8 °C (98.2 °F)-37.7 °C (99.9 °F)] 36.8 °C (98.2 °F)  Pulse:  [] 95  Resp:  [12-60] 28  BP: ()/(45-63) 109/63  SpO2:  [86 %-100 %] 98 %  Blood Pressure: 109/63   Temperature: 36.8 °C (98.2 °F)   Pulse: 95   Respiration: (!) 28   Pulse Oximetry: 98 %       Physical Exam  Vitals and nursing note reviewed.   Constitutional:        Appearance: Normal appearance.   HENT:      Head: Normocephalic and atraumatic.      Right Ear: Tympanic membrane normal.      Left Ear: Tympanic membrane normal.      Nose: Nose normal.      Mouth/Throat:      Mouth: Mucous membranes are moist.      Pharynx: Oropharynx is clear.   Eyes:      Extraocular Movements: Extraocular movements intact.      Pupils: Pupils are equal, round, and reactive to light.   Cardiovascular:      Rate and Rhythm: Normal rate and regular rhythm.      Pulses: Normal pulses.      Heart sounds: Normal heart sounds.   Pulmonary:      Effort: Pulmonary effort is normal. No respiratory distress.      Breath sounds: Normal breath sounds. No stridor.   Abdominal:      General: Bowel sounds are normal. There is no distension.      Palpations: Abdomen is soft. There is no mass.      Tenderness: There is abdominal tenderness.   Musculoskeletal:         General: Deformity present. No swelling or tenderness. Normal range of motion.      Cervical back: Neck supple.   Skin:     General: Skin is warm.      Capillary Refill: Capillary refill takes less than 2 seconds.   Neurological:      General: No focal deficit present.      Mental Status: She is alert and oriented to person, place, and time. Mental status is at baseline.      Cranial Nerves: No cranial nerve deficit.      Sensory: No sensory deficit.   Psychiatric:         Mood and Affect: Mood normal.         Behavior: Behavior normal.       Laboratory:  Recent Labs     02/17/23  1553   WBC 20.0*   RBC 3.54*   HEMOGLOBIN 11.3*   HEMATOCRIT 35.3*   MCV 99.7*   MCH 31.9   MCHC 32.0*   RDW 55.1*   PLATELETCT 407   MPV 10.9     Recent Labs     02/17/23  1553   SODIUM 138   POTASSIUM 3.5*   CHLORIDE 113*   CO2 12*   GLUCOSE 91   BUN 13   CREATININE 0.65   CALCIUM 8.4*     Recent Labs     02/17/23  1553   ALTSGPT 9   ASTSGOT 14   ALKPHOSPHAT 112*   TBILIRUBIN <0.2   GLUCOSE 91         No results for input(s): NTPROBNP in the last 72 hours.      Recent  Labs     02/17/23  1553 02/17/23  1833   TROPONINT 9 8       Imaging:  CT-CTA CHEST PULMONARY ARTERY W/ RECONS   Final Result         1.  No pulmonary embolus appreciated.   2.  Linear densities the bilateral lung bases favor changes of atelectasis.   3.  Right pulmonary nodule, see nodule follow-up recommendations below.      Fleischner Society pulmonary nodule recommendations:      Low Risk: No routine follow-up      High Risk: Optional CT at 12 months      Comments: Nodules less than 6 mm do not require routine follow-up, but certain patients at high risk with suspicious nodule morphology, upper lobe location, or both may warrant 12-month follow-up.      Low Risk - Minimal or absent history of smoking and of other known risk factors.      High Risk - History of smoking or of other known risk factors.      Note: These recommendations do not apply to lung cancer screening, patients with immunosuppression, or patients with known primary cancer.      Fleischner Society 2017 Guidelines for Management of Incidentally Detected Pulmonary Nodules in Adults         CT-ABDOMEN-PELVIS WITH   Final Result         1.  Mild intrahepatic and extra hepatic biliary ductal dilatation and pancreatic ductal dilatation, commonly associated with postcholecystectomy physiology, consider causes of biliary obstruction with additional workup as clinically appropriate.   2.  Punctate bilateral renal calculi without visualized obstructive changes   3.  Hepatomegaly      DX-CHEST-PORTABLE (1 VIEW)   Final Result      No acute cardiopulmonary disease.          X-Ray:  I have personally reviewed the images and compared with prior images.    Assessment/Plan:  Justification for Admission Status  I anticipate this patient will require at least two midnights for appropriate medical management, necessitating inpatient admission because Urosepsis requiring IV Fluids, IV antibiotics and step down unit monitoring.    Patient will need a Intermediate  Care (Adult and Pediatrics) bed on MEDICAL service .  The need is secondary to see above.    * Sepsis (HCC)- (present on admission)  Assessment & Plan  This is Sepsis Present on admission  SIRS criteria identified on my evaluation include: Tachycardia, with heart rate greater than 90 BPM, Tachypnea, with respirations greater than 20 per minute and Leukocytosis, with WBC greater than 12,000  Source is urosepsis  Sepsis protocol initiated  Fluid resuscitation ordered per protocol  Crystalloid Fluid Administration: Fluid resuscitation ordered per standard protocol - 30 mL/kg per current or ideal body weight  IV antibiotics as appropriate for source of sepsis  Reassessment: I have reassessed the patient's hemodynamic status          Acute UTI  Assessment & Plan  Rocephin q24 hourly   F/ cultures     Bipolar 1 disorder (HCC)- (present on admission)  Assessment & Plan  Continue with Paxil and Seroquel    Hypotension- (present on admission)  Assessment & Plan  Patient has a history of hypertension.  This likely is worsened by sepsis.  Resuscitated with IV fluids.  Continue with midodrine.    ACP (advance care planning)- (present on admission)  Assessment & Plan  Patient wishes to be DNR/DNI.    Arthritis, rheumatoid (HCC)- (present on admission)  Assessment & Plan  History of         VTE prophylaxis: SCDs/TEDs

## 2023-02-19 ENCOUNTER — PHARMACY VISIT (OUTPATIENT)
Dept: PHARMACY | Facility: MEDICAL CENTER | Age: 53
End: 2023-02-19
Payer: COMMERCIAL

## 2023-02-19 VITALS
BODY MASS INDEX: 19.45 KG/M2 | SYSTOLIC BLOOD PRESSURE: 90 MMHG | TEMPERATURE: 97.6 F | OXYGEN SATURATION: 94 % | DIASTOLIC BLOOD PRESSURE: 57 MMHG | HEIGHT: 63 IN | WEIGHT: 109.79 LBS | RESPIRATION RATE: 20 BRPM | HEART RATE: 82 BPM

## 2023-02-19 PROBLEM — N30.00 ACUTE CYSTITIS WITHOUT HEMATURIA: Status: RESOLVED | Noted: 2023-02-18 | Resolved: 2023-02-19

## 2023-02-19 PROBLEM — D51.9 VITAMIN B12 DEFICIENCY ANEMIA: Status: ACTIVE | Noted: 2023-02-19

## 2023-02-19 PROBLEM — G63 VITAMIN B12 DEFICIENCY NEUROPATHY (HCC): Status: ACTIVE | Noted: 2022-08-29

## 2023-02-19 PROBLEM — R74.01 TRANSAMINITIS: Status: RESOLVED | Noted: 2023-02-19 | Resolved: 2023-02-19

## 2023-02-19 PROBLEM — R91.1 RIGHT MIDDLE LOBE PULMONARY NODULE: Status: ACTIVE | Noted: 2023-02-19

## 2023-02-19 PROBLEM — A41.9 SEVERE SEPSIS (HCC): Status: RESOLVED | Noted: 2023-02-18 | Resolved: 2023-02-19

## 2023-02-19 PROBLEM — R65.20 SEVERE SEPSIS (HCC): Status: RESOLVED | Noted: 2023-02-18 | Resolved: 2023-02-19

## 2023-02-19 LAB
ALBUMIN SERPL BCP-MCNC: 2.5 G/DL (ref 3.2–4.9)
ALBUMIN/GLOB SERPL: 0.9 G/DL
ALP SERPL-CCNC: 148 U/L (ref 30–99)
ALT SERPL-CCNC: 37 U/L (ref 2–50)
ANION GAP SERPL CALC-SCNC: 8 MMOL/L (ref 7–16)
AST SERPL-CCNC: 35 U/L (ref 12–45)
BACTERIA UR CULT: NORMAL
BASOPHILS # BLD AUTO: 0.9 % (ref 0–1.8)
BASOPHILS # BLD: 0.04 K/UL (ref 0–0.12)
BILIRUB SERPL-MCNC: <0.2 MG/DL (ref 0.1–1.5)
BUN SERPL-MCNC: 12 MG/DL (ref 8–22)
CALCIUM ALBUM COR SERPL-MCNC: 8.7 MG/DL (ref 8.5–10.5)
CALCIUM SERPL-MCNC: 7.5 MG/DL (ref 8.5–10.5)
CHLORIDE SERPL-SCNC: 116 MMOL/L (ref 96–112)
CO2 SERPL-SCNC: 14 MMOL/L (ref 20–33)
CREAT SERPL-MCNC: 0.84 MG/DL (ref 0.5–1.4)
EOSINOPHIL # BLD AUTO: 0.02 K/UL (ref 0–0.51)
EOSINOPHIL NFR BLD: 0.4 % (ref 0–6.9)
ERYTHROCYTE [DISTWIDTH] IN BLOOD BY AUTOMATED COUNT: 58.9 FL (ref 35.9–50)
GFR SERPLBLD CREATININE-BSD FMLA CKD-EPI: 84 ML/MIN/1.73 M 2
GLOBULIN SER CALC-MCNC: 2.9 G/DL (ref 1.9–3.5)
GLUCOSE SERPL-MCNC: 90 MG/DL (ref 65–99)
HCT VFR BLD AUTO: 26.1 % (ref 37–47)
HGB BLD-MCNC: 8 G/DL (ref 12–16)
IMM GRANULOCYTES # BLD AUTO: 0.01 K/UL (ref 0–0.11)
IMM GRANULOCYTES NFR BLD AUTO: 0.2 % (ref 0–0.9)
LYMPHOCYTES # BLD AUTO: 1.18 K/UL (ref 1–4.8)
LYMPHOCYTES NFR BLD: 25.8 % (ref 22–41)
MAGNESIUM SERPL-MCNC: 2.4 MG/DL (ref 1.5–2.5)
MCH RBC QN AUTO: 31.5 PG (ref 27–33)
MCHC RBC AUTO-ENTMCNC: 30.7 G/DL (ref 33.6–35)
MCV RBC AUTO: 102.8 FL (ref 81.4–97.8)
MONOCYTES # BLD AUTO: 0.34 K/UL (ref 0–0.85)
MONOCYTES NFR BLD AUTO: 7.4 % (ref 0–13.4)
NEUTROPHILS # BLD AUTO: 2.99 K/UL (ref 2–7.15)
NEUTROPHILS NFR BLD: 65.3 % (ref 44–72)
NRBC # BLD AUTO: 0 K/UL
NRBC BLD-RTO: 0 /100 WBC
PLATELET # BLD AUTO: 242 K/UL (ref 164–446)
PMV BLD AUTO: 10.7 FL (ref 9–12.9)
POTASSIUM SERPL-SCNC: 4.2 MMOL/L (ref 3.6–5.5)
PROT SERPL-MCNC: 5.4 G/DL (ref 6–8.2)
RBC # BLD AUTO: 2.54 M/UL (ref 4.2–5.4)
SIGNIFICANT IND 70042: NORMAL
SITE SITE: NORMAL
SODIUM SERPL-SCNC: 138 MMOL/L (ref 135–145)
SOURCE SOURCE: NORMAL
WBC # BLD AUTO: 4.6 K/UL (ref 4.8–10.8)

## 2023-02-19 PROCEDURE — RXMED WILLOW AMBULATORY MEDICATION CHARGE: Performed by: STUDENT IN AN ORGANIZED HEALTH CARE EDUCATION/TRAINING PROGRAM

## 2023-02-19 PROCEDURE — 700102 HCHG RX REV CODE 250 W/ 637 OVERRIDE(OP): Performed by: STUDENT IN AN ORGANIZED HEALTH CARE EDUCATION/TRAINING PROGRAM

## 2023-02-19 PROCEDURE — 700111 HCHG RX REV CODE 636 W/ 250 OVERRIDE (IP): Performed by: STUDENT IN AN ORGANIZED HEALTH CARE EDUCATION/TRAINING PROGRAM

## 2023-02-19 PROCEDURE — 99239 HOSP IP/OBS DSCHRG MGMT >30: CPT | Performed by: STUDENT IN AN ORGANIZED HEALTH CARE EDUCATION/TRAINING PROGRAM

## 2023-02-19 PROCEDURE — 85025 COMPLETE CBC W/AUTO DIFF WBC: CPT

## 2023-02-19 PROCEDURE — 700105 HCHG RX REV CODE 258: Performed by: STUDENT IN AN ORGANIZED HEALTH CARE EDUCATION/TRAINING PROGRAM

## 2023-02-19 PROCEDURE — A9270 NON-COVERED ITEM OR SERVICE: HCPCS | Performed by: STUDENT IN AN ORGANIZED HEALTH CARE EDUCATION/TRAINING PROGRAM

## 2023-02-19 PROCEDURE — 80053 COMPREHEN METABOLIC PANEL: CPT

## 2023-02-19 PROCEDURE — 83735 ASSAY OF MAGNESIUM: CPT

## 2023-02-19 RX ORDER — CEFDINIR 300 MG/1
300 CAPSULE ORAL 2 TIMES DAILY
Qty: 6 CAPSULE | Refills: 0 | Status: ACTIVE | OUTPATIENT
Start: 2023-02-19 | End: 2023-02-22

## 2023-02-19 RX ORDER — DULOXETIN HYDROCHLORIDE 30 MG/1
CAPSULE, DELAYED RELEASE ORAL
Qty: 53 CAPSULE | Refills: 2 | Status: SHIPPED | OUTPATIENT
Start: 2023-02-19 | End: 2023-03-07 | Stop reason: SINTOL

## 2023-02-19 RX ORDER — CAPSAICIN 0.025 %
1 CREAM (GRAM) TOPICAL 3 TIMES DAILY PRN
Qty: 60 G | Refills: 2 | Status: SHIPPED | OUTPATIENT
Start: 2023-02-19 | End: 2023-03-22

## 2023-02-19 RX ORDER — OXYCODONE HYDROCHLORIDE 5 MG/1
5 TABLET ORAL EVERY 8 HOURS PRN
Qty: 9 TABLET | Refills: 0 | Status: SHIPPED | OUTPATIENT
Start: 2023-02-19 | End: 2023-02-22

## 2023-02-19 RX ADMIN — FOLIC ACID 1 MG: 1 TABLET ORAL at 06:06

## 2023-02-19 RX ADMIN — SUCRALFATE 1 G: 1 TABLET ORAL at 06:12

## 2023-02-19 RX ADMIN — THERA TABS 1 TABLET: TAB at 06:03

## 2023-02-19 RX ADMIN — SUCRALFATE 1 G: 1 TABLET ORAL at 11:03

## 2023-02-19 RX ADMIN — THIAMINE HCL TAB 100 MG 100 MG: 100 TAB at 06:05

## 2023-02-19 RX ADMIN — MIDODRINE HYDROCHLORIDE 10 MG: 5 TABLET ORAL at 06:12

## 2023-02-19 RX ADMIN — KETOROLAC TROMETHAMINE 30 MG: 30 INJECTION, SOLUTION INTRAMUSCULAR; INTRAVENOUS at 00:01

## 2023-02-19 RX ADMIN — OMEPRAZOLE 20 MG: 20 CAPSULE, DELAYED RELEASE ORAL at 06:05

## 2023-02-19 RX ADMIN — SODIUM CHLORIDE, POTASSIUM CHLORIDE, SODIUM LACTATE AND CALCIUM CHLORIDE: 600; 310; 30; 20 INJECTION, SOLUTION INTRAVENOUS at 06:35

## 2023-02-19 RX ADMIN — OXYCODONE 5 MG: 5 TABLET ORAL at 08:23

## 2023-02-19 RX ADMIN — MIDODRINE HYDROCHLORIDE 10 MG: 5 TABLET ORAL at 11:03

## 2023-02-19 RX ADMIN — KETOROLAC TROMETHAMINE 30 MG: 30 INJECTION, SOLUTION INTRAMUSCULAR; INTRAVENOUS at 06:06

## 2023-02-19 RX ADMIN — CYANOCOBALAMIN TAB 500 MCG 1000 MCG: 500 TAB at 06:03

## 2023-02-19 RX ADMIN — KETOROLAC TROMETHAMINE 30 MG: 30 INJECTION, SOLUTION INTRAMUSCULAR; INTRAVENOUS at 11:11

## 2023-02-19 RX ADMIN — Medication 400 MG: at 06:03

## 2023-02-19 RX ADMIN — NICOTINE TRANSDERMAL SYSTEM 21 MG: 21 PATCH, EXTENDED RELEASE TRANSDERMAL at 06:04

## 2023-02-19 ASSESSMENT — PAIN DESCRIPTION - PAIN TYPE
TYPE: CHRONIC PAIN
TYPE: ACUTE PAIN;CHRONIC PAIN
TYPE: ACUTE PAIN;CHRONIC PAIN
TYPE: CHRONIC PAIN
TYPE: CHRONIC PAIN

## 2023-02-19 ASSESSMENT — FIBROSIS 4 INDEX: FIB4 SCORE: 2.13

## 2023-02-19 NOTE — CARE PLAN
The patient is Watcher - Medium risk of patient condition declining or worsening    Shift Goals  Clinical Goals: Monitor Potassium, pain control, hemodynamic stability, treat infection  Patient Goals: pain control  Family Goals: NAIN    Progress made toward(s) clinical / shift goals:    Problem: Pain - Standard  Goal: Alleviation of pain or a reduction in pain to the patient’s comfort goal  Outcome: Progressing     Problem: Knowledge Deficit - Standard  Goal: Patient and family/care givers will demonstrate understanding of plan of care, disease process/condition, diagnostic tests and medications  Outcome: Progressing     Problem: Skin Integrity  Goal: Skin integrity is maintained or improved  Outcome: Progressing       Patient is not progressing towards the following goals:      Problem: Hemodynamics  Goal: Patient's hemodynamics, fluid balance and neurologic status will be stable or improve  Pt receiving Midodrine and LR continuous and bolus to keep MAP greater than 65     2/18/2023 1733 by Tobias Vuong [email or web address]  Outcome: Not Progressing

## 2023-02-19 NOTE — CARE PLAN
The patient is Stable - Low risk of patient condition declining or worsening    Shift Goals  Clinical Goals: monitor BP, pain management  Patient Goals: Discharge  Family Goals: NAIN    Progress made toward(s) clinical / shift goals:    Problem: Pain - Standard  Goal: Alleviation of pain or a reduction in pain to the patient’s comfort goal  Outcome: Progressing     Problem: Hemodynamics  Goal: Patient's hemodynamics, fluid balance and neurologic status will be stable or improve  Outcome: Progressing     Problem: Fluid Volume  Goal: Fluid volume balance will be maintained  Outcome: Progressing     Problem: Fall Risk  Goal: Patient will remain free from falls  Outcome: Progressing     Problem: Knowledge Deficit - Standard  Goal: Patient and family/care givers will demonstrate understanding of plan of care, disease process/condition, diagnostic tests and medications  Outcome: Met     Problem: Skin Integrity  Goal: Skin integrity is maintained or improved  Outcome: Met       Patient is not progressing towards the following goals:

## 2023-02-19 NOTE — DISCHARGE SUMMARY
Discharge Summary    CHIEF COMPLAINT ON ADMISSION  Chief Complaint   Patient presents with    Syncope    Hypotension       Reason for Admission  Sepsis (HCC)     Admission Date  2/17/2023    CODE STATUS  DNAR/DNI    HPI & HOSPITAL COURSE  Mary Martinez is a 52 y.o. female who presented on 2/17/2023 with dizziness, syncope, and abdominal pain.  This is a pleasant woman with a history of recurrent urinary tract infections, severe rheumatoid arthritis status post amputation of fingers on the right hand, tobacco use, duodenal perforation in 2019, and previous cholecystectomy, chronic narcotic use off of narcotics for several months, and depression.  She was recently admitted for starvation ketosis.  She was noted to be eating only 25 to 50% of the meals at the time.  She did not want to go to a skilled nursing facility to continue tube feeds and was discharged.    In the emergency room, patient was noted to be hypotensive requiring multiple IV fluid boluses.  With improvement of her blood pressures.  White count was 20.0 and the patient was tachycardic with heart rate into the 100s.  Blood pressures dropped down to the 80s over 40s, which improved with sepsis bolus 30 mL/kg.  However, upon admission, her blood pressures continue to drop even though she was maintained on her midodrine.  She developed a transaminitis meeting criteria for severe sepsis.  Her urinalysis showed many bacteria with small leukocyte esterase consistent with acute cystitis without hematuria as a source of her severe sepsis.  Nitrite was positive.    Patient continued to receive multiple fluid boluses while continuing on crystalloid infusion.  She responded well to intravenous ceftriaxone with 4 white count dropping from 20.0 to 4.6.  Patient's transaminitis also improved.  Although patient's systolic blood pressures were down to 70s-80s per blood pressure cuff readings, patient continued to maintain adequate mentation and functionality.   Patient reported that her blood pressure usually ran low.  Patient continued to improve significantly and requested to be discharged.  Her blood pressures were in the 90s over 50s prior to discharge.    Upon further evaluation, it was discovered that the patient was wiping forwards in the front instead of backwards from the back due to her amputated fingers on the right and her severe rheumatoid arthritis affected hand on the left.  She had significant rheumatoid arthritis symptoms in her hands, which responded well to IV Toradol.  However, she could not be maintained on NSAIDs due to a history of GI bleeding.  She also had neuropathy symptoms in the bilateral lower extremities.  Patient's vitamin B12 level was noted to be low at 259 on 1/19/2023, which could be causing patient's neuropathy symptoms as well as macrocytic anemia.  Patient was advised to start vitamin B12 supplementation as well as a multivitamin.  Patient was also agreeable to changing her Paxil to duloxetine to help with neuropathic pain.  She was also discharged with a trial of capsaicin cream for her neuropathy symptoms.  She was also discharged with a short course of oxycodone for her severe pain symptoms.    Patient stated that she has continued to smoke.  I counseled the patient for 5 minutes regarding smoking cessation using methods such as nicotine replacement therapy with patches and gum and medications such as Wellbutrin or Chantix.  I also discussed with her  breathing techniques and meditation as well as regular physical activity, which will assist her with smoking cessation.  Patient stated that she did not tolerate Wellbutrin.  She also stated that she had nicotine patches at home but did request nicotine gum to use along with the nicotine patches.    Patient also had a CT angiogram evaluation on admission, which revealed an incidental right pulmonary nodule measuring 4 mm, which will need reevaluation for repeat CT scan in 12  months.      Therefore, she is discharged in good and stable condition to home with close outpatient follow-up.    The patient met 2-midnight criteria for an inpatient stay at the time of discharge.    Discharge Date  2/19/2023    FOLLOW UP ITEMS POST DISCHARGE  -Follow-up with PCP in 3 to 5 days.  -Repeat CT scan of the chest in 12 months to reevaluate right pulmonary nodule, 4 mm.  -Follow-up with your pain management doctor.    DISCHARGE DIAGNOSES  Principal Problem (Resolved):    Severe sepsis (HCC) POA: Yes  Active Problems:    Hypotension POA: Yes    Rheumatoid arthritis of hand (HCC) POA: Yes    Macrocytic anemia POA: Yes    Tobacco dependence POA: Yes    DNR (do not resuscitate) POA: Yes    Severe protein-calorie malnutrition (HCC) POA: Yes    Bipolar 1 disorder (HCC) POA: Yes    Neuropathy POA: Yes  Resolved Problems:    Acute cystitis without hematuria POA: Yes    Transaminitis POA: Yes      FOLLOW UP  Future Appointments   Date Time Provider Department Center   2/24/2023  3:40 PM Jayy Kerr M.D. [facility] None     Fidencio Christopher D.O.  [address] [facility] [address]  [facility] [address]  [phone]    Follow up in 3 day(s)        MEDICATIONS ON DISCHARGE     Medication List        Start taking these medications        Instructions   capsaicin 0.025 % cream  Commonly known as: Zostrix   Apply 1 Application topically 3 times a day as needed (leg pain).  Dose: 1 Application     cefdinir 300 MG Caps  Commonly known as: OMNICEF   Take 1 Capsule by mouth 2 times a day for 3 days.  Dose: 300 mg     DULoxetine 30 MG Cpep  Start taking on: February 19, 2023  Commonly known as: CYMBALTA   Take 1 Capsule by mouth every day for 7 days, THEN 2 Capsules every day for 23 days.     multivitamin Tabs   Take 1 Tablet by mouth every day.  Dose: 1 Tablet     nicotine polacrilex 2 MG Gum  Commonly known as: NICORETTE   Take 1 Each by mouth every 1 hour as needed for Smoking Cessation (For nicotine urge).  Dose: 2 mg      oxyCODONE immediate-release 5 MG Tabs  Commonly known as: ROXICODONE   Take 1 Tablet by mouth every 8 hours as needed for Severe Pain for up to 3 days.  Dose: 5 mg            Change how you take these medications        Instructions   cyanocobalamin 1000 MCG Tabs  What changed: when to take this  Commonly known as: VITAMIN B12   Take 1 Tablet by mouth every morning with breakfast.  Dose: 1,000 mcg            Continue taking these medications        Instructions   folic acid 1 MG Tabs  Commonly known as: FOLVITE   Take 1 Tablet by mouth 2 times a day.  Dose: 1 mg     magnesium oxide 400 (240 Mg) MG Tabs   Take 1 Tablet by mouth every day.  Dose: 400 mg     midodrine 10 MG tablet  Commonly known as: PROAMATINE   Take 1 Tablet by mouth 3 times a day with meals.  Dose: 10 mg     omeprazole 20 MG delayed-release capsule  Commonly known as: PRILOSEC   Take 1 Capsule by mouth every day.  Dose: 20 mg     QUEtiapine 100 MG Tabs  Commonly known as: Seroquel   Take 1 Tablet by mouth every evening.  Dose: 100 mg     sucralfate 1 GM Tabs  Commonly known as: CARAFATE   Take 1 Tablet by mouth 4 Times a Day,Before Meals and at Bedtime.  Dose: 1 g     thiamine 100 MG tablet  Commonly known as: THIAMINE   Take 1 Tablet by mouth every day.  Dose: 100 mg            Stop taking these medications      PARoxetine 30 MG Tabs  Commonly known as: PAXIL              Allergies  Allergies   Allergen Reactions    Penicillins Anaphylaxis and Hives     Tolerates cephalosporins; reports throat swelling with PCN    Abilify Unspecified     Multiple side effects    Aripiprazole Nausea     Spasms, shaking      Nitrous Oxide Vomiting       DIET  Orders Placed This Encounter   Procedures    Diet Order Diet: Regular     Standing Status:   Standing     Number of Occurrences:   1     Order Specific Question:   Diet:     Answer:   Regular [1]       ACTIVITY  As tolerated.  Weight bearing as  tolerated    CONSULTATIONS  None    PROCEDURES  None    LABORATORY  Lab Results   Component Value Date    SODIUM 138 02/19/2023    POTASSIUM 4.2 02/19/2023    CHLORIDE 116 (H) 02/19/2023    CO2 14 (L) 02/19/2023    GLUCOSE 90 02/19/2023    BUN 12 02/19/2023    CREATININE 0.84 02/19/2023    CREATININE 0.7 11/29/2008        Lab Results   Component Value Date    WBC 4.6 (L) 02/19/2023    HEMOGLOBIN 8.0 (L) 02/19/2023    HEMATOCRIT 26.1 (L) 02/19/2023    PLATELETCT 242 02/19/2023        Total time of the discharge process exceeds 36 minutes.

## 2023-02-19 NOTE — PROGRESS NOTES
Paged Dr. Umanzor in regards to pt's persistent hypotension, BP currently 76/57 MAP 57 after recheck in b/l arms. Pt resting supine in bed, alert and asymptomatic. Per MD, order to give NS 500mL bolus now and continue to monitor as pt is asymptomatic and home BP's trend soft.

## 2023-02-19 NOTE — CARE PLAN
The patient is Watcher - Medium risk of patient condition declining or worsening    Shift Goals  Clinical Goals: Stable BP, pain management  Patient Goals: Pain control  Family Goals: NAIN    Progress made toward(s) clinical / shift goals:     Problem: Pain - Standard  Goal: Alleviation of pain or a reduction in pain to the patient’s comfort goal  Outcome: Progressing     Problem: Knowledge Deficit - Standard  Goal: Patient and family/care givers will demonstrate understanding of plan of care, disease process/condition, diagnostic tests and medications  Outcome: Progressing     Problem: Skin Integrity  Goal: Skin integrity is maintained or improved  Outcome: Progressing     Problem: Hemodynamics  Goal: Patient's hemodynamics, fluid balance and neurologic status will be stable or improve  Outcome: Progressing     Problem: Fluid Volume  Goal: Fluid volume balance will be maintained  Outcome: Progressing     Problem: Urinary - Renal Perfusion  Goal: Ability to achieve and maintain adequate renal perfusion and functioning will improve  Outcome: Progressing     Problem: Respiratory  Goal: Patient will achieve/maintain optimum respiratory ventilation and gas exchange  Outcome: Progressing     Problem: Mechanical Ventilation  Goal: Safe management of artificial airway and ventilation  Outcome: Progressing  Goal: Successful weaning off mechanical ventilator, spontaneously maintains adequate gas exchange  Outcome: Progressing  Goal: Patient will be able to express needs and understand communication  Outcome: Progressing     Problem: Physical Regulation  Goal: Diagnostic test results will improve  Outcome: Progressing  Goal: Signs and symptoms of infection will decrease  Outcome: Progressing     Problem: Fall Risk  Goal: Patient will remain free from falls  Outcome: Progressing       Patient is not progressing towards the following goals:

## 2023-02-19 NOTE — ASSESSMENT & PLAN NOTE
Status post amputation in the right hand and severe disease in the left hand making her difficult to wipe on her backside following urination.  Likely cause of patient's recurrent urinary tract infections.    Complaining of pain.  I have ordered IV Toradol, ibuprofen, oxycodone, and IV morphine as needed.

## 2023-02-19 NOTE — ASSESSMENT & PLAN NOTE
Etiology unclear.  Suspect from malnutrition.    Work-up with ferritin, iron panel, reticulocyte panel, B12, folate, LDH, TSH

## 2023-02-19 NOTE — PROGRESS NOTES
Pt discharged via wheelchair to car with  and RN. All belongings sent with patient. Discharge paperwork and education provided.

## 2023-02-19 NOTE — ASSESSMENT & PLAN NOTE
She describes paresthesias and pain in the bilateral lower extremities.  Consistent with neuropathy.    Trial capsaicin cream.  Can consider gabapentin if needed.  Concern for malnutrition contributing.  Check vitamin B12 and folate levels.  Start multivitamin supplementation.

## 2023-02-19 NOTE — PROGRESS NOTES
12 hour chart check     Monitor Summary     Sinus rhythm with 1st degree AV block, HR

## 2023-02-19 NOTE — ASSESSMENT & PLAN NOTE
This is Severe Sepsis Present on admission  SIRS criteria identified on my evaluation include: Tachycardia, with heart rate greater than 90 BPM and Leukocytosis, with WBC greater than 12,000  Clinical indicators of end organ dysfunction include Systolic blood pressure (SBP) <90 mmHg or mean arterial pressure <65 mmHg, transaminitis  Source is urinary tract  Sepsis protocol initiated  Crystalloid Fluid Administration: Fluid resuscitation ordered per standard protocol - 30 mL/kg per current or ideal body weight  IV antibiotics as appropriate for source of sepsis ceftriaxone.  Reassessment: I have reassessed the patient's hemodynamic status    Continue fluid boluses as needed to maintain MAP greater than 65.  I have ordered additional lactic acids for further evaluation.  We will continue ceftriaxone and midodrine for pressor support.

## 2023-02-19 NOTE — ASSESSMENT & PLAN NOTE
Body mass index is 19.17 kg/m².    Significant subcutaneous tissue and muscle wasting.  Etiology unclear.  Nutrition consult  Multivitamin supplementation

## 2023-02-24 ENCOUNTER — OFFICE VISIT (OUTPATIENT)
Dept: PHYSICAL MEDICINE AND REHAB | Facility: MEDICAL CENTER | Age: 53
End: 2023-02-24
Payer: MEDICAID

## 2023-02-24 VITALS
TEMPERATURE: 97.5 F | HEIGHT: 63 IN | BODY MASS INDEX: 18.64 KG/M2 | HEART RATE: 95 BPM | DIASTOLIC BLOOD PRESSURE: 64 MMHG | WEIGHT: 105.2 LBS | OXYGEN SATURATION: 99 % | SYSTOLIC BLOOD PRESSURE: 100 MMHG

## 2023-02-24 DIAGNOSIS — M05.79 RHEUMATOID ARTHRITIS INVOLVING MULTIPLE SITES WITH POSITIVE RHEUMATOID FACTOR (HCC): ICD-10-CM

## 2023-02-24 DIAGNOSIS — M19.019 ARTHRITIS OF GLENOHUMERAL JOINT: ICD-10-CM

## 2023-02-24 DIAGNOSIS — Z91.81 AT RISK FOR FALLS: ICD-10-CM

## 2023-02-24 PROCEDURE — 99215 OFFICE O/P EST HI 40 MIN: CPT | Performed by: PHYSICAL MEDICINE & REHABILITATION

## 2023-02-24 RX ORDER — OXYCODONE HYDROCHLORIDE 5 MG/1
5 TABLET ORAL 3 TIMES DAILY PRN
Qty: 21 TABLET | Refills: 0 | Status: SHIPPED | OUTPATIENT
Start: 2023-02-24 | End: 2023-03-01 | Stop reason: SDUPTHER

## 2023-02-24 ASSESSMENT — PATIENT HEALTH QUESTIONNAIRE - PHQ9
CLINICAL INTERPRETATION OF PHQ2 SCORE: 6
5. POOR APPETITE OR OVEREATING: 3 - NEARLY EVERY DAY

## 2023-02-24 ASSESSMENT — FIBROSIS 4 INDEX: FIB4 SCORE: 1.19

## 2023-02-24 ASSESSMENT — PAIN SCALES - GENERAL: PAINLEVEL: 10=SEVERE PAIN

## 2023-02-28 NOTE — DOCUMENTATION QUERY
"                                                                         [facility]                                                                       Query Response Note      PATIENT:               STEFFANIE ELLISON  ACCT #:                  [id number]  MRN:                     [id number]  :                      1970  ADMIT DATE:       2023 3:35 PM  DISCH DATE:        2023 12:28 PM  RESPONDING  PROVIDER #:        [id number]           QUERY TEXT:    Documentation in the medical record indicates that this patient has been diagnosed with acute cystitis and admitted for Sepsis.     Please clarify the status of sepsis after further study based on the above clinical indicators.    Note: If you agree with a diagnosis listed above, please remember to include it in your concurrent daily documentation and onto the Discharge Summary.        The patient's Clinical Indicators include:  52 year old female admitted for  acute cystitis.     ED Note [facility] \"The patient's lactic acid is normal, the patient does have a leukocytosis, give the patient IV antibiotics. \"    VS 2000 T99.9, P 108, R 23, 88/50, 99% on 2L     H&P  [facility] \"This is Sepsis Present on admission  SIRS criteria identified on my evaluation include: Tachycardia, with heart rate greater than 90 BPM, Tachypnea, with respirations greater than 20 per minute and Leukocytosis, with WBC greater than 12,000\"     Discharge Summary [place] \"Severe sepsis\"  \"Although patient's systolic  blood pressures were down to 70s-80s per blood pressure cuff readings, patient continued to maintain adequate mentation and functionality.  Patient reported that her blood pressure usually ran low.  \"    Risk factors: hypotension, RA, amputated fingers, wiping forwards instead of backwards  Treatment: labs, CT, CXR, IVF , OMNICEF    If you have any questions please contact:  Hanane Dennison RN CDI [facility]  [email or web address]  Hanane Dennison [address] " Voalte  Options provided:   -- Severe Sepsis ruled out after further study   -- Severe Sepsis confirmed with acute cystitis   -- Severe Sepsis with organ dysfunction ruled out after further study, treated for sepsis without organ dysfunction   -- Other explanation, please specify   -- Unable to determine      Query created by: Hanane Dennison on 2/24/2023 2:24 PM    RESPONSE TEXT:    Severe Sepsis confirmed with acute cystitis          Electronically signed by:  KYM PAYTON MD 2/28/2023 1:34 PM

## 2023-03-01 DIAGNOSIS — M05.79 RHEUMATOID ARTHRITIS INVOLVING MULTIPLE SITES WITH POSITIVE RHEUMATOID FACTOR (HCC): ICD-10-CM

## 2023-03-01 DIAGNOSIS — M19.019 ARTHRITIS OF GLENOHUMERAL JOINT: ICD-10-CM

## 2023-03-01 RX ORDER — OXYCODONE HYDROCHLORIDE 5 MG/1
5 TABLET ORAL 3 TIMES DAILY PRN
Qty: 21 TABLET | Refills: 0 | Status: SHIPPED | OUTPATIENT
Start: 2023-03-01 | End: 2023-03-07 | Stop reason: SDUPTHER

## 2023-03-02 ENCOUNTER — TELEMEDICINE (OUTPATIENT)
Dept: PHYSICAL MEDICINE AND REHAB | Facility: MEDICAL CENTER | Age: 53
End: 2023-03-02
Payer: MEDICAID

## 2023-03-02 ASSESSMENT — PATIENT HEALTH QUESTIONNAIRE - PHQ9
CLINICAL INTERPRETATION OF PHQ2 SCORE: 1
SUM OF ALL RESPONSES TO PHQ QUESTIONS 1-9: 11
5. POOR APPETITE OR OVEREATING: 3 - NEARLY EVERY DAY

## 2023-03-02 ASSESSMENT — PAIN SCALES - GENERAL: PAINLEVEL: 7=MODERATE-SEVERE PAIN

## 2023-03-07 ENCOUNTER — OFFICE VISIT (OUTPATIENT)
Dept: PHYSICAL MEDICINE AND REHAB | Facility: MEDICAL CENTER | Age: 53
End: 2023-03-07
Payer: MEDICAID

## 2023-03-07 VITALS
SYSTOLIC BLOOD PRESSURE: 110 MMHG | BODY MASS INDEX: 19.35 KG/M2 | DIASTOLIC BLOOD PRESSURE: 64 MMHG | OXYGEN SATURATION: 97 % | WEIGHT: 109.2 LBS | TEMPERATURE: 97.9 F | HEIGHT: 63 IN | HEART RATE: 97 BPM

## 2023-03-07 DIAGNOSIS — M05.79 RHEUMATOID ARTHRITIS INVOLVING MULTIPLE SITES WITH POSITIVE RHEUMATOID FACTOR (HCC): ICD-10-CM

## 2023-03-07 DIAGNOSIS — F32.A DEPRESSION, UNSPECIFIED DEPRESSION TYPE: ICD-10-CM

## 2023-03-07 DIAGNOSIS — F11.90 CHRONIC, CONTINUOUS USE OF OPIOIDS: ICD-10-CM

## 2023-03-07 DIAGNOSIS — S13.100D: ICD-10-CM

## 2023-03-07 DIAGNOSIS — Z91.81 AT RISK FOR FALLS: ICD-10-CM

## 2023-03-07 DIAGNOSIS — M19.019 ARTHRITIS OF GLENOHUMERAL JOINT: ICD-10-CM

## 2023-03-07 PROCEDURE — 99214 OFFICE O/P EST MOD 30 MIN: CPT | Performed by: PHYSICAL MEDICINE & REHABILITATION

## 2023-03-07 RX ORDER — OXYCODONE HYDROCHLORIDE 5 MG/1
5 TABLET ORAL 3 TIMES DAILY PRN
Qty: 90 TABLET | Refills: 0 | Status: SHIPPED | OUTPATIENT
Start: 2023-03-08 | End: 2023-03-26

## 2023-03-07 RX ORDER — PAROXETINE 30 MG/1
60 TABLET, FILM COATED ORAL EVERY EVENING
COMMUNITY
End: 2023-10-06 | Stop reason: SDUPTHER

## 2023-03-07 RX ORDER — NALOXONE HYDROCHLORIDE 4 MG/.1ML
SPRAY NASAL
Qty: 1 EACH | Refills: 1 | Status: SHIPPED | OUTPATIENT
Start: 2023-03-07 | End: 2023-09-14

## 2023-03-07 ASSESSMENT — PATIENT HEALTH QUESTIONNAIRE - PHQ9
CLINICAL INTERPRETATION OF PHQ2 SCORE: 6
5. POOR APPETITE OR OVEREATING: 3 - NEARLY EVERY DAY
SUM OF ALL RESPONSES TO PHQ QUESTIONS 1-9: 22

## 2023-03-07 ASSESSMENT — FIBROSIS 4 INDEX: FIB4 SCORE: 1.19

## 2023-03-07 ASSESSMENT — PAIN SCALES - GENERAL: PAINLEVEL: 8=MODERATE-SEVERE PAIN

## 2023-03-07 NOTE — PROGRESS NOTES
Follow up patient note  Pain Medicine, Interventional spine and sports physiatry, Physical medicine rehabilitation    Date of Service: 03/7/2023    Chief complaint:   Joint pain and neck pain      HISTORY    Please see new patient note dated 06/08/2018 by Dr Kerr,  for more details.     HPI  Patient identification: Mary Martinez 52 y.o. female returns for follow-up of her pain related to rheumatoid arthritis.      Interval history:   Mary returns for follow-up.  She has had ongoing pain related to RA and abdominal pain.  The abdominal pain is intermittent.  She has been eating more and has gained a few pounds.  Follow-up with GI in June 2023.  The celiac plexus block, 9/9/22 that had seemed very helpful, but pain returned.    Pain is 8/10 on the NRS in multiple joints including shoulders, knees, cervical spine.  She feels like the oxycodone has been helpful.  Taking 5mg every 8 hours.  No side effects.  This is less than she had been taking, but she would like to continue at this level.    Neck pain is ongoing, she is going to follow-up with Dr. Valdovinos on 03/13/2023, who has been monitoring for atlantoaxial interval widening.    Dr. Dela Cruz is her Rheumatologist.    Reports that she works with Mckenzie at [facility] and that she will have a new PCP as Dr. Christopher left [facility].  She has a new PCP there that she will not see until April 2023.    Works with [facility] for behavioral health.  Duloxetine caused side effects and she has resumed paxil.    PHQ-9: 22 denies suicidality, continues to work with her psychology and psychiatry team  ORT: 4      Records review:  Hospitalized for severe sepsis due to recurrent urinary tract infection.  Reviewed discharged from Dr. Mann 02/17/2023-02/19/2023    ROS  See HPI    Red Flags :   Fever, Chills, Sweats: Denies  Involuntary Weight Loss: Denies  Bowel/Bladder Incontinence: Denies      PMHx:   Past Medical History:    Diagnosis Date    Acute renal failure (ARF) (HCC)     Allergy     Anemia     Anxiety     Arthritis     Rheumatoid -follows with rheumatologist. Has several fractures due to RA.    ASTHMA     inhalers prn    Blood transfusion without reported diagnosis     Bowel habit changes     4/24/20-constipation and diarrhea.    Bronchitis last approx 2018    Chronic pain 04/24/2020    Due to RA    Dental disorder     no teeth upper-does not wear her denture. Broken teeth on bottom.    Depression     GERD (gastroesophageal reflux disease)     GIB (gastrointestinal bleeding)     Head ache     Heart burn     taking famotidine    Hiatus hernia syndrome     History of pancreatitis     Hypokalemia     Hyponatremia     Idiopathic chronic pancreatitis (HCC)     Indigestion     taking famotidine    Intractable nausea and vomiting     Kidney disease     Pain     CPS-everywhere    Pneumonia 10/2019    PONV (postoperative nausea and vomiting)     Psychiatric problem     anxiety and depression    Rheumatoid aortitis     Rheumatoid arthritis(714.0) 2003    SMAS (superior mesenteric artery syndrome) ([facility])     Substance abuse ([facility])        PSHx:   Past Surgical History:   Procedure Laterality Date    VA UPPER GI ENDOSCOPY,DIAGNOSIS N/A 9/9/2022    Procedure: ENDOSCOPIC ULTRASOUND- UPPER;  Surgeon: Jorge Brooke M.D.;  Location: [facility];  Service: EUS    EGD W/ENDOSCOPIC ULTRASOUND N/A 9/9/2022    Procedure: EGD, WITH ENDOSCOPIC US;  Surgeon: Jorge Brooke M.D.;  Location: [facility];  Service: EUS    VA ENDOSCOPIC US EXAM, ESOPH  4/29/2020    Procedure: EGD, WITH ENDOSCOPIC US;  Surgeon: Jorge Brooke M.D.;  Location: [facility];  Service: Gastroenterology    GASTROSCOPY-ENDO  4/29/2020    Procedure: GASTROSCOPY;  Surgeon: Jorge Brooke M.D.;  Location: [facility];  Service: Gastroenterology    EGD WITH ASP/BX  4/29/2020    Procedure: EGD, WITH ASPIRATION  BIOPSY - POSS FNA;  Surgeon: Jorge Brooke M.D.;  Location: SURGERY [facility];  Service: Gastroenterology    ND EXPLORATORY OF ABDOMEN N/A 10/4/2019    Procedure: LAPAROTOMY, EXPLORATORY AND REPAIR OF DUODENAL PERFORATION;  Surgeon: James Dumont M.D.;  Location: [facility];  Service: General    COLONOSCOPY  2018    with upper endoscopy    FINGER ARTHROPLASTY Right 6/5/2017    Procedure: FINGER ARTHROPLASTY - LONG, RING AND SMALL VOLAR PLATE;  Surgeon: Lobo Rosen M.D.;  Location: SURGERY SAME DAY [facility];  Service:     FINGER AMPUTATION  6/5/2017    Procedure: FINGER AMPUTATION - LONG, RING AND SMALL AT THE PROXIMAL INTERPHALANGEAL JOINT;  Surgeon: Lobo Rosen M.D.;  Location: SURGERY SAME DAY [facility];  Service:     FINGER ARTHROPLASTY Right 4/17/2017    Procedure: FINGER ARTHROPLASTY ;  Surgeon: Lobo Rosne M.D.;  Location: SURGERY SAME DAY [facility];  Service:     FINGER AMPUTATION Right 9/14/2016    Procedure: FINGER AMPUTATION INDEX;  Surgeon: Lobo Rosen M.D.;  Location: SURGERY SAME DAY [facility];  Service:     IRRIGATION & DEBRIDEMENT ORTHO Right 9/11/2016    Procedure: IRRIGATION & DEBRIDEMENT ORTHO - right index finger;  Surgeon: Madhu Rosen M.D.;  Location: [facility];  Service:     FUSION, PIP JOINT, TOE Right 8/29/2016    Procedure: RE-DO INDEX FINGER PROXIMAL INTERPHALANGEAL ARTHRODESIS;  Surgeon: Lobo Rosen M.D.;  Location: SURGERY SAME DAY [facility];  Service:     BONE GRAFT Right 8/29/2016    Procedure: BONE GRAFT - DISTAL RADIUS ;  Surgeon: Lobo Rosen M.D.;  Location: SURGERY SAME DAY [facility];  Service:     ARTHRODESIS Right 5/6/2016    Procedure: ARTHRODESIS INDEX FINGER PROXIMAL INTERPHALANGEAL;  Surgeon: Lobo Rosen M.D.;  Location: SURGERY SAME DAY [facility];  Service:     FOOT RECONSTRUCTION RHEUMATIC Left 7/29/2015    Procedure: FOOT  RECONSTRUCTION RHEUMATIC;  Surgeon: Heriberto Alves M.D.;  Location: SURGERY [facility];  Service:     TOE FUSION Right 5/27/2015    Procedure: TOE FUSION 1ST METATARSALPHALANGEAL JOINT;  Surgeon: Heriberto Alves M.D.;  Location: SURGERY [facility];  Service:     BONE SPUR EXCISION  5/27/2015    Procedure: BONE SPUR EXCISION METATARSAL HEAD 2-3;  Surgeon: Heriberto Alves M.D.;  Location: SURGERY [facility];  Service:     HAMMERTOE CORRECTION  5/27/2015    Procedure: HAMMERTOE CORRECTION AND SOFT TISSUE RE-ALINGMENT 2-3 ;  Surgeon: Heriberto Alves M.D.;  Location: SURGERY [facility];  Service:     DENTAL EXTRACTION(S)  2014    all of upper teeth    ABDOMINAL ABSCESS DRAINAGE  9/27/2011    Performed by VERO WRIGHT at SURGERY [facility]    EMANUEL BY LAPAROSCOPY  9/27/2011    Performed by VERO WRIGHT at [facility]    APPENDECTOMY  2011    Found out it had ruptured prior to abcess surgery    REPEAT C SECTION  2008    REPEAT C SECTION  2005    REPEAT C SECTION  1999    PRIMARY C SECTION  1991    TONSILLECTOMY  1982    WRIST EXPLORATION Left 1980's    fractured wrist-no hardware       Family history   Family History   Problem Relation Age of Onset    Cancer Mother     Heart Disease Mother     Hypertension Mother     Heart Disease Father     Hypertension Father          Medications:   Outpatient Medications Marked as Taking for the 3/7/23 encounter (Office Visit) with Jayy Kerr M.D.   Medication Sig Dispense Refill    [START ON 3/8/2023] oxyCODONE immediate-release (ROXICODONE) 5 MG Tab Take 1 Tablet by mouth 3 times a day as needed for Severe Pain for up to 30 days. 90 Tablet 0    Naloxone (NARCAN) 4 MG/0.1ML Liquid One spray in one nostril for overdose and call 911. 1 Each 1    PARoxetine (PAXIL) 30 MG Tab Take 60 mg by mouth every day.      capsaicin (ZOSTRIX) 0.025 % cream Apply 1 Application topically 3 times a day as needed (leg pain). 60 g 2    nicotine  polacrilex (NICORETTE) 2 MG Gum Take 1 Each by mouth every 1 hour as needed for Smoking Cessation (For nicotine urge). 120 Each 2    multivitamin Tab Take 1 Tablet by mouth every day. 30 Tablet 2    cyanocobalamin (VITAMIN B12) 1000 MCG Tab Take 1 Tablet by mouth every morning with breakfast. 30 Tablet 3    folic acid (FOLVITE) 1 MG Tab Take 1 Tablet by mouth 2 times a day. 30 Tablet 3    midodrine (PROAMATINE) 10 MG tablet Take 1 Tablet by mouth 3 times a day with meals. 60 Tablet 1    QUEtiapine (SEROQUEL) 100 MG Tab Take 1 Tablet by mouth every evening. 60 Tablet 3    magnesium oxide 400 (240 Mg) MG Tab Take 1 Tablet by mouth every day. 30 Tablet 1    omeprazole (PRILOSEC) 20 MG delayed-release capsule Take 1 Capsule by mouth every day. 30 Capsule 1    thiamine (THIAMINE) 100 MG tablet Take 1 Tablet by mouth every day. 30 Tablet 1    sucralfate (CARAFATE) 1 GM Tab Take 1 Tablet by mouth 4 Times a Day,Before Meals and at Bedtime. 120 Tablet 3       Allergies:   Allergies   Allergen Reactions    Penicillins Anaphylaxis and Hives     Tolerates cephalosporins; reports throat swelling with PCN    Abilify Unspecified     Multiple side effects    Aripiprazole Nausea     Spasms, shaking      Nitrous Oxide Vomiting       Social Hx:   Social History     Socioeconomic History    Marital status:      Spouse name: Ousmane    Number of children: 5    Years of education: Not on file    Highest education level: Not on file   Occupational History    Not on file   Tobacco Use    Smoking status: Every Day     Packs/day: 1.00     Years: 30.00     Pack years: 30.00     Types: Cigarettes    Smokeless tobacco: Never   Vaping Use    Vaping Use: Never used   Substance and Sexual Activity    Alcohol use: Never    Drug use: Never    Sexual activity: Not Currently   Other Topics Concern     Service No    Blood Transfusions Yes    Caffeine Concern No    Occupational Exposure No    Hobby Hazards No    Sleep Concern No    Stress  "Concern Yes    Weight Concern No    Special Diet No    Back Care No    Exercise Yes    Bike Helmet Yes    Seat Belt Yes    Self-Exams Yes   Social History Narrative    ** Merged History Encounter **          Social Determinants of Health     Financial Resource Strain: Low Risk     Difficulty of Paying Living Expenses: Not hard at all   Food Insecurity: No Food Insecurity    Worried About Running Out of Food in the Last Year: Never true    Ran Out of Food in the Last Year: Never true   Transportation Needs: No Transportation Needs    Lack of Transportation (Medical): No    Lack of Transportation (Non-Medical): No   Physical Activity: Not on file   Stress: Not on file   Social Connections: Not on file   Intimate Partner Violence: Not on file   Housing Stability: Not on file         EXAMINATION     Physical Exam:   Vitals: /64 (BP Location: Right arm, Patient Position: Sitting, BP Cuff Size: Adult long)   Pulse 97   Temp 36.6 °C (97.9 °F) (Temporal)   Ht 1.6 m (5' 3\")   Wt 49.5 kg (109 lb 3.2 oz)   SpO2 97%     Constitutional:   Body Habitus: Body mass index is 19.34 kg/m².  Cooperation: Fully cooperates with exam  Appearance: Appears pale, thin.  She is wearing a mask  Respiratory-  breathing comfortable on room air, no audible wheezing  Cardiovascular- no lower extremity edema is observed.     Psychiatric- alert and oriented ×3. Normal affect.     Musculoskeletal:  Decreased ROM of the cervical spine.  Shoulders clunk with MMT, left greater than right.  Manual muscle testing 5/5 shoulder abduction, 5/5 elbow flexion, 5/5 elbow extension, 4/5 wrist extension on the right and 3/5 on the left.    Partial hand amputation on the right at the MCPs; ulnar deviation on the left with MCP subluxation, mild atrophy of the hand intrinsics with wasting of the thenar eminence.  Limited range of motion of the left hand performing .  Left thumb with tenderness to palpation and nodule just distal to the DIP " joint.    Gait is steady without assist device.  Mildly antalgic without loss of balance      MEDICAL DECISION MAKING    DATA    Labs: UDS 10/30/2018 consistent with medications.  Oxycodone and metabolites without other substances   UDS 04/19/2019 consistent with medications. Oxycodone and metabolites without other substances   UDS 07/19/2019 consistent with medications.  Oxycodone and metabolites without other substances   UDS 11/22/2019 consistent with medications.  Oxycodone and metabolites without other substances   UDS 02/20/2020 absent for Oxycodone and metabolites without other substances  UDS 06/10/2020 positive for oxycodone and metabolites without other substances  UDS 08/18/2020 positive for oxycodone and metabolites, positive for EtG without EtS  UDS 09/07/2021 negative for oxycodone and metabolites, patient was out of medication, no other abnormalities  UDS 05/18/2022: positive for oxycodone and metabolites.    UDS 02/24/2023: positive for noroxycodone, negative for other metabolites.  No other substances    Imaging:    Films reviewed.  These are my reads:    Xray right shoulder 08/20/2020  There is note of severe degenerative change of the glenohumeral joint.  No fracture.  Erosive changes, consistent with known history of RA    MRI cervical spine 07/31/2018:    There is is note of motion at the atlantodental level with 5mm atlantodental inverval in flexion that reduces to 1-2 mm in extension.  Mild retrolisthesis of C4 on C5 and anterolisthesis of C5- on C6.  Disc extrusion at C6-7 with mild central canal stenosis    Xray left shoulder 2/5/2018 Humeral head is elevated.  Glenohumeral joint arthritis.    Reports reviewed:    Xray right shoulder 08/20/2020 [facility]  Impression  Extensive erosive changes of the humeral head and degenerative changes of the glenohumeral joint.  Findings are concerning for inflammatory arthropathy such as rheumatoid arthritis.      Xray cervical spine 11/14/2018  1. No acute  fracture  2. Persistent motion at the C1-2 joint as before, suggesting atlantoaxial instability  3. Minimal instability noted at C5-6 level as described.    MRI cervical spine 07/31/2018  1. Widening of the atlantodental interal in neutral and flexion positions with a 5mm space which reduces to 1-2 mm in extension  2. 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 Rheumatoid arthritis involving multiple sites with positive rheumatoid factor (HCC)  M05.79   2. Arthritis of glenohumeral joint  M19.019   3. Chronic, continuous use of opioids  F11.90   4. At risk for falls  Z91.81   5. Closed subluxation of cervical spine, subsequent encounter  S13.100D   6. Depression, unspecified depression type  F32.A               ASSESSMENT and PLAN:     Mary Izaguirrechandrika Martinez 52 y.o. female with rheumatoid arthritis    Mary was seen today for follow-up.    Orders and management for this visit:    Orders Placed This Encounter    Patient has been identified as having a positive depression screening. Appropriate orders and counseling have been given.    Patient identified as fall risk.  Appropriate orders and counseling given.    oxyCODONE immediate-release (ROXICODONE) 5 MG Tab    Naloxone (NARCAN) 4 MG/0.1ML Liquid    PARoxetine (PAXIL) 30 MG Tab    Consent for Opiate Prescription       Discussed that she will continue to follow-up with her behavioral health team.  Reviewed that she is able to manage with the lower dose of roxicodone.  Plan to continue 5mg po q8h.   reviewed.  Most recent UDS reviewed.  Plan to recheck in the future.  Consents on file.  Continue paroxetine.  This was added back to her list.  She takes 60mg daily.  No script given.  Narcan given, her current narcan at home as  .  Continue to follow-up with GI and with new PCP at [facility].  She will continue to follow-up with Dr. Dela Cruz for RA.  Follow-up with Dr. Valdovinos next week, as scheduled.        In prescribing controlled substances to this patient, I certify that I have obtained and reviewed the medical history of Mary Martinez. I have also made a good aristides effort to obtain applicable records from other providers who have treated the patient and records did not demonstrate any increased risk of substance abuse that would prevent me from prescribing controlled substances.     I have conducted a physical exam and documented it. I have reviewed Ms. Martinez’s prescription history as maintained by the [place] Prescription Monitoring Program.   ORT 4, indicates moderate risk    I have assessed the patient’s risk for abuse, dependency, and addiction using the validated Opioid Risk Tool available at https://www.mdcalc.com/vjfarj-acai-zfkp-ort-narcotic-abuse.     Given the above, I believe the benefits of controlled substance therapy outweigh the risks. The reasons for prescribing controlled substances include non-narcotic, oral analgesic alternatives have been inadequate for pain control and in my professional opinion, controlled substances are the only reasonable choice for this patient because her pain was note adequately controlled with alternatives  and she has chronic progressive disease. Accordingly, I have discussed the risk and benefits, treatment plan, and alternative therapies with the patient.       Follow up: 4 weeks      Please note that this dictation was created using voice recognition software. I have made every reasonable attempt to correct obvious errors but there may be errors of grammar and content that I may have overlooked prior to finalization of this note.      Jayy Kerr MD  Interventional Spine and Sports Physiatry  Physical Medicine and Rehabilitation  [facility]      PCP: [facility]

## 2023-03-22 ENCOUNTER — APPOINTMENT (OUTPATIENT)
Dept: RADIOLOGY | Facility: MEDICAL CENTER | Age: 53
End: 2023-03-22
Attending: EMERGENCY MEDICINE
Payer: MEDICAID

## 2023-03-22 ENCOUNTER — HOSPITAL ENCOUNTER (OUTPATIENT)
Facility: MEDICAL CENTER | Age: 53
End: 2023-03-24
Attending: EMERGENCY MEDICINE | Admitting: HOSPITALIST
Payer: MEDICAID

## 2023-03-22 PROBLEM — D75.839 THROMBOCYTOSIS: Status: ACTIVE | Noted: 2023-03-22

## 2023-03-22 PROBLEM — R10.9 INTRACTABLE ABDOMINAL PAIN: Status: ACTIVE | Noted: 2023-03-22

## 2023-03-22 LAB
ALBUMIN SERPL BCP-MCNC: 3.2 G/DL (ref 3.2–4.9)
ALBUMIN SERPL BCP-MCNC: 3.8 G/DL (ref 3.2–4.9)
ALBUMIN/GLOB SERPL: 0.8 G/DL
ALBUMIN/GLOB SERPL: 0.8 G/DL
ALP SERPL-CCNC: 128 U/L (ref 30–99)
ALP SERPL-CCNC: 150 U/L (ref 30–99)
ALT SERPL-CCNC: 6 U/L (ref 2–50)
ALT SERPL-CCNC: 8 U/L (ref 2–50)
ANION GAP SERPL CALC-SCNC: 15 MMOL/L (ref 7–16)
ANION GAP SERPL CALC-SCNC: 17 MMOL/L (ref 7–16)
APPEARANCE UR: CLEAR
AST SERPL-CCNC: 10 U/L (ref 12–45)
AST SERPL-CCNC: 8 U/L (ref 12–45)
BACTERIA #/AREA URNS HPF: NEGATIVE /HPF
BASOPHILS # BLD AUTO: 0.9 % (ref 0–1.8)
BASOPHILS # BLD: 0.07 K/UL (ref 0–0.12)
BILIRUB SERPL-MCNC: 0.2 MG/DL (ref 0.1–1.5)
BILIRUB SERPL-MCNC: 0.2 MG/DL (ref 0.1–1.5)
BILIRUB UR QL STRIP.AUTO: NEGATIVE
BUN SERPL-MCNC: 10 MG/DL (ref 8–22)
BUN SERPL-MCNC: 10 MG/DL (ref 8–22)
CALCIUM ALBUM COR SERPL-MCNC: 9.3 MG/DL (ref 8.5–10.5)
CALCIUM ALBUM COR SERPL-MCNC: 9.9 MG/DL (ref 8.5–10.5)
CALCIUM SERPL-MCNC: 8.7 MG/DL (ref 8.5–10.5)
CALCIUM SERPL-MCNC: 9.7 MG/DL (ref 8.5–10.5)
CHLORIDE SERPL-SCNC: 105 MMOL/L (ref 96–112)
CHLORIDE SERPL-SCNC: 106 MMOL/L (ref 96–112)
CO2 SERPL-SCNC: 15 MMOL/L (ref 20–33)
CO2 SERPL-SCNC: 15 MMOL/L (ref 20–33)
COLOR UR: YELLOW
CREAT SERPL-MCNC: 0.66 MG/DL (ref 0.5–1.4)
CREAT SERPL-MCNC: 0.77 MG/DL (ref 0.5–1.4)
EKG IMPRESSION: NORMAL
EOSINOPHIL # BLD AUTO: 0.2 K/UL (ref 0–0.51)
EOSINOPHIL NFR BLD: 2.5 % (ref 0–6.9)
EPI CELLS #/AREA URNS HPF: ABNORMAL /HPF
ERYTHROCYTE [DISTWIDTH] IN BLOOD BY AUTOMATED COUNT: 50.3 FL (ref 35.9–50)
GFR SERPLBLD CREATININE-BSD FMLA CKD-EPI: 106 ML/MIN/1.73 M 2
GFR SERPLBLD CREATININE-BSD FMLA CKD-EPI: 93 ML/MIN/1.73 M 2
GLOBULIN SER CALC-MCNC: 4.2 G/DL (ref 1.9–3.5)
GLOBULIN SER CALC-MCNC: 4.7 G/DL (ref 1.9–3.5)
GLUCOSE SERPL-MCNC: 105 MG/DL (ref 65–99)
GLUCOSE SERPL-MCNC: 92 MG/DL (ref 65–99)
GLUCOSE UR STRIP.AUTO-MCNC: NEGATIVE MG/DL
HCG SERPL QL: NEGATIVE
HCT VFR BLD AUTO: 37.2 % (ref 37–47)
HGB BLD-MCNC: 12.3 G/DL (ref 12–16)
HYALINE CASTS #/AREA URNS LPF: ABNORMAL /LPF
IMM GRANULOCYTES # BLD AUTO: 0.04 K/UL (ref 0–0.11)
IMM GRANULOCYTES NFR BLD AUTO: 0.5 % (ref 0–0.9)
KETONES UR STRIP.AUTO-MCNC: 15 MG/DL
LEUKOCYTE ESTERASE UR QL STRIP.AUTO: ABNORMAL
LIPASE SERPL-CCNC: 33 U/L (ref 11–82)
LYMPHOCYTES # BLD AUTO: 1.75 K/UL (ref 1–4.8)
LYMPHOCYTES NFR BLD: 22.1 % (ref 22–41)
MAGNESIUM SERPL-MCNC: 1.8 MG/DL (ref 1.5–2.5)
MAGNESIUM SERPL-MCNC: 1.8 MG/DL (ref 1.5–2.5)
MAGNESIUM SERPL-MCNC: 1.9 MG/DL (ref 1.5–2.5)
MCH RBC QN AUTO: 30.2 PG (ref 27–33)
MCHC RBC AUTO-ENTMCNC: 33.1 G/DL (ref 33.6–35)
MCV RBC AUTO: 91.4 FL (ref 81.4–97.8)
MICRO URNS: ABNORMAL
MONOCYTES # BLD AUTO: 0.46 K/UL (ref 0–0.85)
MONOCYTES NFR BLD AUTO: 5.8 % (ref 0–13.4)
NEUTROPHILS # BLD AUTO: 5.39 K/UL (ref 2–7.15)
NEUTROPHILS NFR BLD: 68.2 % (ref 44–72)
NITRITE UR QL STRIP.AUTO: NEGATIVE
NRBC # BLD AUTO: 0 K/UL
NRBC BLD-RTO: 0 /100 WBC
PH UR STRIP.AUTO: 7 [PH] (ref 5–8)
PHOSPHATE SERPL-MCNC: 2.6 MG/DL (ref 2.5–4.5)
PLATELET # BLD AUTO: 637 K/UL (ref 164–446)
PMV BLD AUTO: 9.7 FL (ref 9–12.9)
POTASSIUM SERPL-SCNC: 3.7 MMOL/L (ref 3.6–5.5)
POTASSIUM SERPL-SCNC: 4 MMOL/L (ref 3.6–5.5)
PROT SERPL-MCNC: 7.4 G/DL (ref 6–8.2)
PROT SERPL-MCNC: 8.5 G/DL (ref 6–8.2)
PROT UR QL STRIP: 30 MG/DL
RBC # BLD AUTO: 4.07 M/UL (ref 4.2–5.4)
RBC # URNS HPF: ABNORMAL /HPF
RBC UR QL AUTO: NEGATIVE
SODIUM SERPL-SCNC: 136 MMOL/L (ref 135–145)
SODIUM SERPL-SCNC: 137 MMOL/L (ref 135–145)
SP GR UR STRIP.AUTO: 1.02
TROPONIN T SERPL-MCNC: 15 NG/L (ref 6–19)
UROBILINOGEN UR STRIP.AUTO-MCNC: 0.2 MG/DL
WBC # BLD AUTO: 7.9 K/UL (ref 4.8–10.8)
WBC #/AREA URNS HPF: ABNORMAL /HPF

## 2023-03-22 PROCEDURE — 700102 HCHG RX REV CODE 250 W/ 637 OVERRIDE(OP): Performed by: EMERGENCY MEDICINE

## 2023-03-22 PROCEDURE — 96374 THER/PROPH/DIAG INJ IV PUSH: CPT

## 2023-03-22 PROCEDURE — 80053 COMPREHEN METABOLIC PANEL: CPT

## 2023-03-22 PROCEDURE — A9270 NON-COVERED ITEM OR SERVICE: HCPCS

## 2023-03-22 PROCEDURE — 90471 IMMUNIZATION ADMIN: CPT

## 2023-03-22 PROCEDURE — 83735 ASSAY OF MAGNESIUM: CPT

## 2023-03-22 PROCEDURE — 700105 HCHG RX REV CODE 258

## 2023-03-22 PROCEDURE — 74018 RADEX ABDOMEN 1 VIEW: CPT

## 2023-03-22 PROCEDURE — 84100 ASSAY OF PHOSPHORUS: CPT

## 2023-03-22 PROCEDURE — 81001 URINALYSIS AUTO W/SCOPE: CPT

## 2023-03-22 PROCEDURE — 90686 IIV4 VACC NO PRSV 0.5 ML IM: CPT

## 2023-03-22 PROCEDURE — 36415 COLL VENOUS BLD VENIPUNCTURE: CPT

## 2023-03-22 PROCEDURE — G0378 HOSPITAL OBSERVATION PER HR: HCPCS

## 2023-03-22 PROCEDURE — 84703 CHORIONIC GONADOTROPIN ASSAY: CPT

## 2023-03-22 PROCEDURE — 99285 EMERGENCY DEPT VISIT HI MDM: CPT

## 2023-03-22 PROCEDURE — 700111 HCHG RX REV CODE 636 W/ 250 OVERRIDE (IP): Performed by: EMERGENCY MEDICINE

## 2023-03-22 PROCEDURE — 93005 ELECTROCARDIOGRAM TRACING: CPT | Performed by: EMERGENCY MEDICINE

## 2023-03-22 PROCEDURE — 700111 HCHG RX REV CODE 636 W/ 250 OVERRIDE (IP)

## 2023-03-22 PROCEDURE — A9270 NON-COVERED ITEM OR SERVICE: HCPCS | Performed by: EMERGENCY MEDICINE

## 2023-03-22 PROCEDURE — 700105 HCHG RX REV CODE 258: Performed by: EMERGENCY MEDICINE

## 2023-03-22 PROCEDURE — 99223 1ST HOSP IP/OBS HIGH 75: CPT

## 2023-03-22 PROCEDURE — 85025 COMPLETE CBC W/AUTO DIFF WBC: CPT

## 2023-03-22 PROCEDURE — 96376 TX/PRO/DX INJ SAME DRUG ADON: CPT

## 2023-03-22 PROCEDURE — 84484 ASSAY OF TROPONIN QUANT: CPT

## 2023-03-22 PROCEDURE — 83690 ASSAY OF LIPASE: CPT

## 2023-03-22 PROCEDURE — 96375 TX/PRO/DX INJ NEW DRUG ADDON: CPT

## 2023-03-22 PROCEDURE — 700102 HCHG RX REV CODE 250 W/ 637 OVERRIDE(OP)

## 2023-03-22 RX ORDER — HYDROMORPHONE HYDROCHLORIDE 1 MG/ML
1 INJECTION, SOLUTION INTRAMUSCULAR; INTRAVENOUS; SUBCUTANEOUS ONCE
Status: COMPLETED | OUTPATIENT
Start: 2023-03-22 | End: 2023-03-22

## 2023-03-22 RX ORDER — BISACODYL 10 MG
10 SUPPOSITORY, RECTAL RECTAL
Status: DISCONTINUED | OUTPATIENT
Start: 2023-03-22 | End: 2023-03-24 | Stop reason: HOSPADM

## 2023-03-22 RX ORDER — HYDROMORPHONE HYDROCHLORIDE 1 MG/ML
0.25 INJECTION, SOLUTION INTRAMUSCULAR; INTRAVENOUS; SUBCUTANEOUS
Status: DISCONTINUED | OUTPATIENT
Start: 2023-03-22 | End: 2023-03-24 | Stop reason: HOSPADM

## 2023-03-22 RX ORDER — POLYETHYLENE GLYCOL 3350 17 G/17G
1 POWDER, FOR SOLUTION ORAL
Status: DISCONTINUED | OUTPATIENT
Start: 2023-03-22 | End: 2023-03-24 | Stop reason: HOSPADM

## 2023-03-22 RX ORDER — ONDANSETRON 2 MG/ML
4 INJECTION INTRAMUSCULAR; INTRAVENOUS EVERY 4 HOURS PRN
Status: DISCONTINUED | OUTPATIENT
Start: 2023-03-22 | End: 2023-03-24 | Stop reason: HOSPADM

## 2023-03-22 RX ORDER — SUCRALFATE 1 G/1
1 TABLET ORAL
Status: DISCONTINUED | OUTPATIENT
Start: 2023-03-23 | End: 2023-03-24 | Stop reason: HOSPADM

## 2023-03-22 RX ORDER — OXYCODONE HYDROCHLORIDE 5 MG/1
5 TABLET ORAL
Status: DISCONTINUED | OUTPATIENT
Start: 2023-03-22 | End: 2023-03-24 | Stop reason: HOSPADM

## 2023-03-22 RX ORDER — NICOTINE 21 MG/24HR
21 PATCH, TRANSDERMAL 24 HOURS TRANSDERMAL
Status: DISCONTINUED | OUTPATIENT
Start: 2023-03-23 | End: 2023-03-24 | Stop reason: HOSPADM

## 2023-03-22 RX ORDER — SODIUM CHLORIDE 9 MG/ML
INJECTION, SOLUTION INTRAVENOUS CONTINUOUS
Status: DISCONTINUED | OUTPATIENT
Start: 2023-03-22 | End: 2023-03-24 | Stop reason: HOSPADM

## 2023-03-22 RX ORDER — FOLIC ACID 1 MG/1
1 TABLET ORAL 2 TIMES DAILY
Status: DISCONTINUED | OUTPATIENT
Start: 2023-03-23 | End: 2023-03-24 | Stop reason: HOSPADM

## 2023-03-22 RX ORDER — PROMETHAZINE HYDROCHLORIDE 25 MG/1
12.5-25 SUPPOSITORY RECTAL EVERY 4 HOURS PRN
Status: DISCONTINUED | OUTPATIENT
Start: 2023-03-22 | End: 2023-03-24 | Stop reason: HOSPADM

## 2023-03-22 RX ORDER — PAROXETINE 30 MG/1
60 TABLET, FILM COATED ORAL EVERY EVENING
Status: DISCONTINUED | OUTPATIENT
Start: 2023-03-23 | End: 2023-03-24 | Stop reason: HOSPADM

## 2023-03-22 RX ORDER — DULOXETIN HYDROCHLORIDE 30 MG/1
30 CAPSULE, DELAYED RELEASE ORAL DAILY
Status: DISCONTINUED | OUTPATIENT
Start: 2023-03-23 | End: 2023-03-24 | Stop reason: HOSPADM

## 2023-03-22 RX ORDER — ACETAMINOPHEN 325 MG/1
650 TABLET ORAL EVERY 6 HOURS PRN
Status: DISCONTINUED | OUTPATIENT
Start: 2023-03-22 | End: 2023-03-24 | Stop reason: HOSPADM

## 2023-03-22 RX ORDER — DULOXETIN HYDROCHLORIDE 30 MG/1
30 CAPSULE, DELAYED RELEASE ORAL DAILY
Status: SHIPPED | COMMUNITY
End: 2023-05-25

## 2023-03-22 RX ORDER — SODIUM CHLORIDE 9 MG/ML
1000 INJECTION, SOLUTION INTRAVENOUS ONCE
Status: COMPLETED | OUTPATIENT
Start: 2023-03-22 | End: 2023-03-22

## 2023-03-22 RX ORDER — ONDANSETRON 2 MG/ML
4 INJECTION INTRAMUSCULAR; INTRAVENOUS ONCE
Status: COMPLETED | OUTPATIENT
Start: 2023-03-22 | End: 2023-03-22

## 2023-03-22 RX ORDER — AMOXICILLIN 250 MG
2 CAPSULE ORAL 2 TIMES DAILY
Status: DISCONTINUED | OUTPATIENT
Start: 2023-03-23 | End: 2023-03-24 | Stop reason: HOSPADM

## 2023-03-22 RX ORDER — MIDODRINE HYDROCHLORIDE 5 MG/1
10 TABLET ORAL
Status: DISCONTINUED | OUTPATIENT
Start: 2023-03-23 | End: 2023-03-24 | Stop reason: HOSPADM

## 2023-03-22 RX ORDER — OMEPRAZOLE 20 MG/1
20 CAPSULE, DELAYED RELEASE ORAL DAILY
Status: DISCONTINUED | OUTPATIENT
Start: 2023-03-23 | End: 2023-03-24 | Stop reason: HOSPADM

## 2023-03-22 RX ORDER — ONDANSETRON 4 MG/1
4 TABLET, ORALLY DISINTEGRATING ORAL EVERY 4 HOURS PRN
Status: DISCONTINUED | OUTPATIENT
Start: 2023-03-22 | End: 2023-03-24 | Stop reason: HOSPADM

## 2023-03-22 RX ORDER — PROMETHAZINE HYDROCHLORIDE 25 MG/1
12.5-25 TABLET ORAL EVERY 4 HOURS PRN
Status: DISCONTINUED | OUTPATIENT
Start: 2023-03-22 | End: 2023-03-24 | Stop reason: HOSPADM

## 2023-03-22 RX ORDER — QUETIAPINE FUMARATE 100 MG/1
100 TABLET, FILM COATED ORAL EVERY EVENING
Status: DISCONTINUED | OUTPATIENT
Start: 2023-03-23 | End: 2023-03-24 | Stop reason: HOSPADM

## 2023-03-22 RX ORDER — PROCHLORPERAZINE EDISYLATE 5 MG/ML
5-10 INJECTION INTRAMUSCULAR; INTRAVENOUS EVERY 4 HOURS PRN
Status: DISCONTINUED | OUTPATIENT
Start: 2023-03-22 | End: 2023-03-24 | Stop reason: HOSPADM

## 2023-03-22 RX ORDER — OXYCODONE HYDROCHLORIDE 5 MG/1
2.5 TABLET ORAL
Status: DISCONTINUED | OUTPATIENT
Start: 2023-03-22 | End: 2023-03-24 | Stop reason: HOSPADM

## 2023-03-22 RX ORDER — CHOLECALCIFEROL (VITAMIN D3) 125 MCG
1000 CAPSULE ORAL
Status: DISCONTINUED | OUTPATIENT
Start: 2023-03-23 | End: 2023-03-24 | Stop reason: HOSPADM

## 2023-03-22 RX ADMIN — FAMOTIDINE 20 MG: 10 INJECTION, SOLUTION INTRAVENOUS at 18:16

## 2023-03-22 RX ADMIN — SODIUM CHLORIDE 1000 ML: 9 INJECTION, SOLUTION INTRAVENOUS at 17:25

## 2023-03-22 RX ADMIN — HYDROMORPHONE HYDROCHLORIDE 1 MG: 1 INJECTION, SOLUTION INTRAMUSCULAR; INTRAVENOUS; SUBCUTANEOUS at 18:15

## 2023-03-22 RX ADMIN — HYDROMORPHONE HYDROCHLORIDE 1 MG: 1 INJECTION, SOLUTION INTRAMUSCULAR; INTRAVENOUS; SUBCUTANEOUS at 13:58

## 2023-03-22 RX ADMIN — LIDOCAINE HYDROCHLORIDE 30 ML: 20 SOLUTION OROPHARYNGEAL at 13:46

## 2023-03-22 RX ADMIN — ONDANSETRON 4 MG: 2 INJECTION INTRAMUSCULAR; INTRAVENOUS at 13:55

## 2023-03-22 RX ADMIN — OXYCODONE HYDROCHLORIDE 5 MG: 5 TABLET ORAL at 23:39

## 2023-03-22 RX ADMIN — SODIUM CHLORIDE 1000 ML: 9 INJECTION, SOLUTION INTRAVENOUS at 20:02

## 2023-03-22 RX ADMIN — SODIUM CHLORIDE 1000 ML: 9 INJECTION, SOLUTION INTRAVENOUS at 13:58

## 2023-03-22 RX ADMIN — INFLUENZA A VIRUS A/VICTORIA/2570/2019 IVR-215 (H1N1) ANTIGEN (FORMALDEHYDE INACTIVATED), INFLUENZA A VIRUS A/DARWIN/9/2021 SAN-010 (H3N2) ANTIGEN (FORMALDEHYDE INACTIVATED), INFLUENZA B VIRUS B/PHUKET/3073/2013 ANTIGEN (FORMALDEHYDE INACTIVATED), AND INFLUENZA B VIRUS B/MICHIGAN/01/2021 ANTIGEN (FORMALDEHYDE INACTIVATED) 0.5 ML: 15; 15; 15; 15 INJECTION, SUSPENSION INTRAMUSCULAR at 23:37

## 2023-03-22 ASSESSMENT — LIFESTYLE VARIABLES
EVER FELT BAD OR GUILTY ABOUT YOUR DRINKING: NO
HAVE YOU EVER FELT YOU SHOULD CUT DOWN ON YOUR DRINKING: NO
TOTAL SCORE: 0
CONSUMPTION TOTAL: NEGATIVE
TOTAL SCORE: 0
EVER HAD A DRINK FIRST THING IN THE MORNING TO STEADY YOUR NERVES TO GET RID OF A HANGOVER: NO
HOW MANY TIMES IN THE PAST YEAR HAVE YOU HAD 5 OR MORE DRINKS IN A DAY: 0
AVERAGE NUMBER OF DAYS PER WEEK YOU HAVE A DRINK CONTAINING ALCOHOL: 0
ON A TYPICAL DAY WHEN YOU DRINK ALCOHOL HOW MANY DRINKS DO YOU HAVE: 0
HAVE PEOPLE ANNOYED YOU BY CRITICIZING YOUR DRINKING: NO
ALCOHOL_USE: NO
TOTAL SCORE: 0
DOES PATIENT WANT TO STOP DRINKING: NO

## 2023-03-22 ASSESSMENT — PATIENT HEALTH QUESTIONNAIRE - PHQ9
3. TROUBLE FALLING OR STAYING ASLEEP OR SLEEPING TOO MUCH: NEARLY EVERY DAY
SUM OF ALL RESPONSES TO PHQ9 QUESTIONS 1 AND 2: 4
8. MOVING OR SPEAKING SO SLOWLY THAT OTHER PEOPLE COULD HAVE NOTICED. OR THE OPPOSITE, BEING SO FIGETY OR RESTLESS THAT YOU HAVE BEEN MOVING AROUND A LOT MORE THAN USUAL: SEVERAL DAYS
2. FEELING DOWN, DEPRESSED, IRRITABLE, OR HOPELESS: MORE THAN HALF THE DAYS
1. LITTLE INTEREST OR PLEASURE IN DOING THINGS: MORE THAN HALF THE DAYS
6. FEELING BAD ABOUT YOURSELF - OR THAT YOU ARE A FAILURE OR HAVE LET YOURSELF OR YOUR FAMILY DOWN: SEVERAL DAYS
7. TROUBLE CONCENTRATING ON THINGS, SUCH AS READING THE NEWSPAPER OR WATCHING TELEVISION: NOT AT ALL
4. FEELING TIRED OR HAVING LITTLE ENERGY: NEARLY EVERY DAY
SUM OF ALL RESPONSES TO PHQ QUESTIONS 1-9: 15
9. THOUGHTS THAT YOU WOULD BE BETTER OFF DEAD, OR OF HURTING YOURSELF: NOT AT ALL
5. POOR APPETITE OR OVEREATING: NEARLY EVERY DAY

## 2023-03-22 ASSESSMENT — FIBROSIS 4 INDEX
FIB4 SCORE: 1.19
FIB4 SCORE: 0.26

## 2023-03-22 ASSESSMENT — ENCOUNTER SYMPTOMS
WEIGHT LOSS: 1
SHORTNESS OF BREATH: 0
NECK PAIN: 0
DIARRHEA: 0
MYALGIAS: 0
NAUSEA: 1
CHILLS: 1
EYES NEGATIVE: 1
VOMITING: 1
ABDOMINAL PAIN: 1
DIZZINESS: 0
CONSTIPATION: 0
PSYCHIATRIC NEGATIVE: 1

## 2023-03-22 ASSESSMENT — PAIN DESCRIPTION - PAIN TYPE: TYPE: ACUTE PAIN;CHRONIC PAIN

## 2023-03-22 NOTE — ED TRIAGE NOTES
Chief Complaint   Patient presents with    Abdominal Pain     Epigastric pain x1 week.      To triage by wheelchair for above. Appears uncomfortable in triage. Abd pain orders placed per protocol. Explained triage process, to waiting room. Asked to inform RN if questions or concerns arise.

## 2023-03-22 NOTE — ED PROVIDER NOTES
ED Provider Note    CHIEF COMPLAINT  Chief Complaint   Patient presents with    Abdominal Pain     Epigastric pain x1 week.        EXTERNAL RECORDS REVIEWED  CT scan completed on 2/17/2023 for acute abnormality    HPI/ROS      Mary Martinez is a 52 y.o. female who presents with complaint of abdominal pain.  She states that she has a history of chronic abdominal pain and she has increasing severity of abdominal pain in the epigastric region.  Pain is dull in nature, rating down to her abdomen.  She states the same type she is pain she had the past.  She states that she has had GI consultation states the Finger was going on.  She does take an antacid but does not have any help with that.  She states that she has not been on a PEEP anything down for at least 4 days as she had nausea and vomiting.  Denies hematochezia melena, hematemesis, materia, fever.    PAST MEDICAL HISTORY   has a past medical history of Acute renal failure (ARF) ([facility]), Allergy, Anemia, Anxiety, Arthritis, ASTHMA, Blood transfusion without reported diagnosis, Bowel habit changes, Bronchitis (last approx 2018), Chronic pain (04/24/2020), Dental disorder, Depression, GERD (gastroesophageal reflux disease), GIB (gastrointestinal bleeding), Head ache, Heart burn, Hiatus hernia syndrome, History of pancreatitis, Hypokalemia, Hyponatremia, Idiopathic chronic pancreatitis (HCC), Indigestion, Intractable nausea and vomiting, Kidney disease, Pain, Pneumonia (10/2019), PONV (postoperative nausea and vomiting), Psychiatric problem, Rheumatoid aortitis, Rheumatoid arthritis(714.0) (2003), SMAS (superior mesenteric artery syndrome) ([facility]), and Substance abuse ([facility]).    SURGICAL HISTORY   has a past surgical history that includes abdominal abscess drainage (9/27/2011); toe fusion (Right, 5/27/2015); bone spur excision (5/27/2015); hammertoe correction (5/27/2015); foot reconstruction rheumatic (Left, 7/29/2015); arthrodesis (Right, 5/6/2016); fusion, pip  "joint, toe (Right, 8/29/2016); bone graft (Right, 8/29/2016); irrigation & debridement ortho (Right, 9/11/2016); finger amputation (Right, 9/14/2016); finger arthroplasty (Right, 4/17/2017); finger arthroplasty (Right, 6/5/2017); finger amputation (6/5/2017); exploratory of abdomen (N/A, 10/4/2019); tonsillectomy (1982); primary c section (1991); repeat c section (1999); repeat c section (2005); repeat c section (2008); appendectomy (2011); nicholas by laparoscopy (9/27/2011); wrist exploration (Left, 1980's); colonoscopy (2018); dental extraction(s) (2014); endoscopic us exam, esoph (4/29/2020); gastroscopy-endo (4/29/2020); egd with asp/bx (4/29/2020); upper gi endoscopy,diagnosis (N/A, 9/9/2022); and egd w/endoscopic ultrasound (N/A, 9/9/2022).    FAMILY HISTORY  Family History   Problem Relation Age of Onset    Cancer Mother     Heart Disease Mother     Hypertension Mother     Heart Disease Father     Hypertension Father        SOCIAL HISTORY  Social History     Tobacco Use    Smoking status: Every Day     Packs/day: 1.00     Years: 30.00     Pack years: 30.00     Types: Cigarettes    Smokeless tobacco: Never   Vaping Use    Vaping Use: Never used   Substance and Sexual Activity    Alcohol use: Never    Drug use: Never    Sexual activity: Not Currently       CURRENT MEDICATIONS  Home Medications    **Home medications have not yet been reviewed for this encounter**         ALLERGIES  Allergies   Allergen Reactions    Penicillins Anaphylaxis and Hives     Tolerates cephalosporins; reports throat swelling with PCN    Abilify Unspecified     Multiple side effects    Aripiprazole Nausea     Spasms, shaking      Nitrous Oxide Vomiting       PHYSICAL EXAM  VITAL SIGNS: BP (!) 122/103   Pulse (!) 104   Temp 37.1 °C (98.7 °F) (Temporal)   Resp 18   Ht 1.6 m (5' 3\")   Wt 47.3 kg (104 lb 4.4 oz)   LMP 09/21/2011   SpO2 98%   BMI 18.47 kg/m²      Nursing notes and vitals reviewed.  Constitutional: Well developed, Well " nourished, No acute distress, Non-toxic appearance.   Eyes: PERRLA, EOMI, Conjunctiva normal, No discharge.   HENT: Normocephalic, Atraumatic, dry mucous membranes, no facial edema   Cardiovascular: Normal heart rate, Normal rhythm, No murmurs, No rubs, No gallops.   Thorax & Lungs: No respiratory distress, No rales, No rhonchi, No wheezing, No chest tenderness.   Abdomen: Bowel sounds normal, Soft, significant epigastric tenderness, no guarding, no rebound no lower quadrant tenderness bilaterally, no pulsatile mass   skin: Warm, Dry, No erythema, No rash.   Extremities: No deformity, no edema, good range of motion range of motion upper lower extremes bilaterally  Neurologic: Alert & oriented x 3, no focal abnormalities noted, acting appropriately on examination  Psychiatric: Affect normal for clinical presentation.      DIAGNOSTIC STUDIES / PROCEDURES  EKG  I have independently interpreted this EKG  Sinus rhythm on monitor    LABS  Results for orders placed or performed during the hospital encounter of 03/22/23   CBC WITH DIFFERENTIAL   Result Value Ref Range    WBC 7.9 4.8 - 10.8 K/uL    RBC 4.07 (L) 4.20 - 5.40 M/uL    Hemoglobin 12.3 12.0 - 16.0 g/dL    Hematocrit 37.2 37.0 - 47.0 %    MCV 91.4 81.4 - 97.8 fL    MCH 30.2 27.0 - 33.0 pg    MCHC 33.1 (L) 33.6 - 35.0 g/dL    RDW 50.3 (H) 35.9 - 50.0 fL    Platelet Count 637 (H) 164 - 446 K/uL    MPV 9.7 9.0 - 12.9 fL    Neutrophils-Polys 68.20 44.00 - 72.00 %    Lymphocytes 22.10 22.00 - 41.00 %    Monocytes 5.80 0.00 - 13.40 %    Eosinophils 2.50 0.00 - 6.90 %    Basophils 0.90 0.00 - 1.80 %    Immature Granulocytes 0.50 0.00 - 0.90 %    Nucleated RBC 0.00 /100 WBC    Neutrophils (Absolute) 5.39 2.00 - 7.15 K/uL    Lymphs (Absolute) 1.75 1.00 - 4.80 K/uL    Monos (Absolute) 0.46 0.00 - 0.85 K/uL    Eos (Absolute) 0.20 0.00 - 0.51 K/uL    Baso (Absolute) 0.07 0.00 - 0.12 K/uL    Immature Granulocytes (abs) 0.04 0.00 - 0.11 K/uL    NRBC (Absolute) 0.00 K/uL   COMP  METABOLIC PANEL   Result Value Ref Range    Sodium 137 135 - 145 mmol/L    Potassium 4.0 3.6 - 5.5 mmol/L    Chloride 105 96 - 112 mmol/L    Co2 15 (L) 20 - 33 mmol/L    Anion Gap 17.0 (H) 7.0 - 16.0    Glucose 105 (H) 65 - 99 mg/dL    Bun 10 8 - 22 mg/dL    Creatinine 0.77 0.50 - 1.40 mg/dL    Calcium 9.7 8.5 - 10.5 mg/dL    AST(SGOT) 10 (L) 12 - 45 U/L    ALT(SGPT) 8 2 - 50 U/L    Alkaline Phosphatase 150 (H) 30 - 99 U/L    Total Bilirubin 0.2 0.1 - 1.5 mg/dL    Albumin 3.8 3.2 - 4.9 g/dL    Total Protein 8.5 (H) 6.0 - 8.2 g/dL    Globulin 4.7 (H) 1.9 - 3.5 g/dL    A-G Ratio 0.8 g/dL   LIPASE   Result Value Ref Range    Lipase 33 11 - 82 U/L   HCG QUAL SERUM   Result Value Ref Range    Beta-Hcg Qualitative Serum Negative Negative   URINALYSIS,CULTURE IF INDICATED    Specimen: Urine   Result Value Ref Range    Color Yellow     Character Clear     Specific Gravity 1.018 <1.035    Ph 7.0 5.0 - 8.0    Glucose Negative Negative mg/dL    Ketones 15 (A) Negative mg/dL    Protein 30 (A) Negative mg/dL    Bilirubin Negative Negative    Urobilinogen, Urine 0.2 Negative    Nitrite Negative Negative    Leukocyte Esterase Trace (A) Negative    Occult Blood Negative Negative    Micro Urine Req Microscopic    CORRECTED CALCIUM   Result Value Ref Range    Correct Calcium 9.9 8.5 - 10.5 mg/dL   ESTIMATED GFR   Result Value Ref Range    GFR (CKD-EPI) 93 >60 mL/min/1.73 m 2   MAGNESIUM   Result Value Ref Range    Magnesium 1.9 1.5 - 2.5 mg/dL   PHOSPHORUS   Result Value Ref Range    Phosphorus 2.6 2.5 - 4.5 mg/dL   TROPONIN   Result Value Ref Range    Troponin T 15 6 - 19 ng/L   URINE MICROSCOPIC (W/UA)   Result Value Ref Range    WBC 0-2 /hpf    RBC 0-2 /hpf    Bacteria Negative None /hpf    Epithelial Cells Few /hpf    Hyaline Cast 3-5 (A) /lpf   CMP   Result Value Ref Range    Sodium 136 135 - 145 mmol/L    Potassium 3.7 3.6 - 5.5 mmol/L    Chloride 106 96 - 112 mmol/L    Co2 15 (L) 20 - 33 mmol/L    Anion Gap 15.0 7.0 - 16.0     Glucose 92 65 - 99 mg/dL    Bun 10 8 - 22 mg/dL    Creatinine 0.66 0.50 - 1.40 mg/dL    Calcium 8.7 8.5 - 10.5 mg/dL    AST(SGOT) 8 (L) 12 - 45 U/L    ALT(SGPT) 6 2 - 50 U/L    Alkaline Phosphatase 128 (H) 30 - 99 U/L    Total Bilirubin 0.2 0.1 - 1.5 mg/dL    Albumin 3.2 3.2 - 4.9 g/dL    Total Protein 7.4 6.0 - 8.2 g/dL    Globulin 4.2 (H) 1.9 - 3.5 g/dL    A-G Ratio 0.8 g/dL   CORRECTED CALCIUM   Result Value Ref Range    Correct Calcium 9.3 8.5 - 10.5 mg/dL   ESTIMATED GFR   Result Value Ref Range    GFR (CKD-EPI) 106 >60 mL/min/1.73 m 2   MAGNESIUM   Result Value Ref Range    Magnesium 1.8 1.5 - 2.5 mg/dL   EKG (Now)   Result Value Ref Range    Report       [facility] Emergency Dept.    Test Date:  2023  Pt Name:    STEFFANIE ELLISON                Department: ER  MRN:        [id number]                      Room:       [facility]  Gender:     Female                       Technician: [id number]  :        [id number]                   Requested By:DIMA WALL  Order #:    [id number]                    Reading MD:    Measurements  Intervals                                Axis  Rate:       61                           P:          78  CA:         182                          QRS:        89  QRSD:       107                          T:          82  QT:         415  QTc:        418    Interpretive Statements  Sinus rhythm  Low voltage, precordial leads  Nonspecific T abnormalities, lateral leads  Baseline wander in lead(s) I,III,aVR,aVL,aVF,V2,V5  Compared to ECG 2023 17:52:23  T-wave abnormality now present           RADIOLOGY  I have independently interpreted the diagnostic imaging associated with this visit and am waiting the final reading from the radiologist.   My preliminary interpretation is as follows: Abdominal x-ray negative for free air  Radiologist interpretation:   [id number] VIEW   Final Result      Nonobstructive gas pattern.   6 mm kidney stone within the left lower pole.             COURSE & MEDICAL DECISION MAKING    ED Observation Status? Yes; I am placing the patient in to an observation status due to a diagnostic uncertainty as well as therapeutic intensity. Patient placed in observation status at 1:36 PM, 3/22/2023.     Observation plan is as follows:     Upon Reevaluation, the patient's condition has: not improved; and will be escalated to hospitalization.    Patient discharged from ED Observation status at 1845  (Time) 3/21/23 (Date).     INITIAL ASSESSMENT, COURSE AND PLAN  Care Narrative:   This is a pleasant 52-year-old female that presents abdominal pain.  Here in the emergency department, the patient had negative x-ray for free air and she has had multiple evaluations for abdominal pain and do not believe she requires CT scan.  She did have a significant Lisek to stress not have a significant leukocytosis but she did have evidence of severe acidosis and anion gap consistent dehydration and vomiting.  She received nausea medication for Zofran, Dilaudid 1 mg IV x2 and repeat Zofran.  On reevaluation after 2 L normal saline fluid bolus, the patient's CMP still revealed a CO2 of 15.  The patient continued to have abdominal pain after that and I do believe the patient require hospitalization.  I do not believe she has appendicitis, diverticulitis, small bowel obstruction colitis that she had significant symptoms in the past.  She will be hospitalized with the APRN and will be seeing Dr. Torres upstairs.  On reevaluation, she still slightly symptomatic, she has no evidence of a surgical condition yet requires hospitalization for IV fluids, IV pain medication and IV nausea medication.      ADDITIONAL PROBLEM LIST    DISPOSITION AND DISCUSSIONS  I have discussed management of the patient with the following physicians and HALIMA's:  APRN      Decision tools and prescription drugs considered including, but not limited to: Consider doing a CT scan of the abdomen pelvis but she had 1  completed approxi-1 month ago that was negative for acute abnormality and she had frequent CT scans therefore I do not believe she would benefit from the radiation at this point.  If she does have increasing symptoms she may require CT scan while she is in the hospital.    FINAL DIAGNOSIS  Intractable nausea and vomiting  Hydration  Abdominal pain  Acidosis       Electronically signed by: Kiel Watson D.O., 3/22/2023 1:36 PM

## 2023-03-23 ENCOUNTER — APPOINTMENT (OUTPATIENT)
Dept: RADIOLOGY | Facility: MEDICAL CENTER | Age: 53
End: 2023-03-23
Payer: MEDICAID

## 2023-03-23 PROBLEM — N13.9 OBSTRUCTIVE UROPATHY: Status: ACTIVE | Noted: 2023-03-23

## 2023-03-23 LAB
ALBUMIN SERPL BCP-MCNC: 2.9 G/DL (ref 3.2–4.9)
ALBUMIN/GLOB SERPL: 0.8 G/DL
ALP SERPL-CCNC: 114 U/L (ref 30–99)
ALT SERPL-CCNC: 6 U/L (ref 2–50)
ANION GAP SERPL CALC-SCNC: 10 MMOL/L (ref 7–16)
AST SERPL-CCNC: 7 U/L (ref 12–45)
BASOPHILS # BLD AUTO: 0.8 % (ref 0–1.8)
BASOPHILS # BLD: 0.06 K/UL (ref 0–0.12)
BILIRUB SERPL-MCNC: <0.2 MG/DL (ref 0.1–1.5)
BUN SERPL-MCNC: 9 MG/DL (ref 8–22)
CALCIUM ALBUM COR SERPL-MCNC: 9.4 MG/DL (ref 8.5–10.5)
CALCIUM SERPL-MCNC: 8.5 MG/DL (ref 8.5–10.5)
CHLORIDE SERPL-SCNC: 112 MMOL/L (ref 96–112)
CO2 SERPL-SCNC: 15 MMOL/L (ref 20–33)
CREAT SERPL-MCNC: 0.6 MG/DL (ref 0.5–1.4)
EOSINOPHIL # BLD AUTO: 0.27 K/UL (ref 0–0.51)
EOSINOPHIL NFR BLD: 3.8 % (ref 0–6.9)
ERYTHROCYTE [DISTWIDTH] IN BLOOD BY AUTOMATED COUNT: 53.1 FL (ref 35.9–50)
GFR SERPLBLD CREATININE-BSD FMLA CKD-EPI: 109 ML/MIN/1.73 M 2
GLOBULIN SER CALC-MCNC: 3.5 G/DL (ref 1.9–3.5)
GLUCOSE SERPL-MCNC: 85 MG/DL (ref 65–99)
HCT VFR BLD AUTO: 29.1 % (ref 37–47)
HGB BLD-MCNC: 9.1 G/DL (ref 12–16)
IMM GRANULOCYTES # BLD AUTO: 0.03 K/UL (ref 0–0.11)
IMM GRANULOCYTES NFR BLD AUTO: 0.4 % (ref 0–0.9)
LYMPHOCYTES # BLD AUTO: 2.25 K/UL (ref 1–4.8)
LYMPHOCYTES NFR BLD: 31.7 % (ref 22–41)
MCH RBC QN AUTO: 30 PG (ref 27–33)
MCHC RBC AUTO-ENTMCNC: 31.3 G/DL (ref 33.6–35)
MCV RBC AUTO: 96 FL (ref 81.4–97.8)
MONOCYTES # BLD AUTO: 0.57 K/UL (ref 0–0.85)
MONOCYTES NFR BLD AUTO: 8 % (ref 0–13.4)
NEUTROPHILS # BLD AUTO: 3.91 K/UL (ref 2–7.15)
NEUTROPHILS NFR BLD: 55.3 % (ref 44–72)
NRBC # BLD AUTO: 0 K/UL
NRBC BLD-RTO: 0 /100 WBC
PLATELET # BLD AUTO: 498 K/UL (ref 164–446)
PMV BLD AUTO: 9.7 FL (ref 9–12.9)
POTASSIUM SERPL-SCNC: 3.8 MMOL/L (ref 3.6–5.5)
PROT SERPL-MCNC: 6.4 G/DL (ref 6–8.2)
RBC # BLD AUTO: 3.03 M/UL (ref 4.2–5.4)
SODIUM SERPL-SCNC: 137 MMOL/L (ref 135–145)
WBC # BLD AUTO: 7.1 K/UL (ref 4.8–10.8)

## 2023-03-23 PROCEDURE — 99232 SBSQ HOSP IP/OBS MODERATE 35: CPT | Performed by: HOSPITALIST

## 2023-03-23 PROCEDURE — A9270 NON-COVERED ITEM OR SERVICE: HCPCS

## 2023-03-23 PROCEDURE — 96376 TX/PRO/DX INJ SAME DRUG ADON: CPT

## 2023-03-23 PROCEDURE — 700111 HCHG RX REV CODE 636 W/ 250 OVERRIDE (IP)

## 2023-03-23 PROCEDURE — 85025 COMPLETE CBC W/AUTO DIFF WBC: CPT

## 2023-03-23 PROCEDURE — G0378 HOSPITAL OBSERVATION PER HR: HCPCS

## 2023-03-23 PROCEDURE — 700102 HCHG RX REV CODE 250 W/ 637 OVERRIDE(OP)

## 2023-03-23 PROCEDURE — 700105 HCHG RX REV CODE 258

## 2023-03-23 PROCEDURE — 76775 US EXAM ABDO BACK WALL LIM: CPT

## 2023-03-23 PROCEDURE — 80053 COMPREHEN METABOLIC PANEL: CPT

## 2023-03-23 RX ADMIN — SUCRALFATE 1 G: 1 TABLET ORAL at 11:23

## 2023-03-23 RX ADMIN — ONDANSETRON 4 MG: 2 INJECTION INTRAMUSCULAR; INTRAVENOUS at 12:33

## 2023-03-23 RX ADMIN — NICOTINE TRANSDERMAL SYSTEM 21 MG: 21 PATCH, EXTENDED RELEASE TRANSDERMAL at 05:09

## 2023-03-23 RX ADMIN — CYANOCOBALAMIN TAB 500 MCG 1000 MCG: 500 TAB at 07:34

## 2023-03-23 RX ADMIN — FOLIC ACID 1 MG: 1 TABLET ORAL at 05:09

## 2023-03-23 RX ADMIN — OXYCODONE HYDROCHLORIDE 5 MG: 5 TABLET ORAL at 04:35

## 2023-03-23 RX ADMIN — OXYCODONE HYDROCHLORIDE 5 MG: 5 TABLET ORAL at 11:24

## 2023-03-23 RX ADMIN — SODIUM CHLORIDE: 9 INJECTION, SOLUTION INTRAVENOUS at 14:42

## 2023-03-23 RX ADMIN — DULOXETINE HYDROCHLORIDE 30 MG: 30 CAPSULE, DELAYED RELEASE ORAL at 05:10

## 2023-03-23 RX ADMIN — PAROXETINE 60 MG: 30 TABLET, FILM COATED ORAL at 17:28

## 2023-03-23 RX ADMIN — OMEPRAZOLE 20 MG: 20 CAPSULE, DELAYED RELEASE ORAL at 05:09

## 2023-03-23 RX ADMIN — HYDROMORPHONE HYDROCHLORIDE 0.25 MG: 1 INJECTION, SOLUTION INTRAMUSCULAR; INTRAVENOUS; SUBCUTANEOUS at 21:50

## 2023-03-23 RX ADMIN — SUCRALFATE 1 G: 1 TABLET ORAL at 17:24

## 2023-03-23 RX ADMIN — MIDODRINE HYDROCHLORIDE 10 MG: 5 TABLET ORAL at 11:24

## 2023-03-23 RX ADMIN — DOCUSATE SODIUM 50 MG AND SENNOSIDES 8.6 MG 2 TABLET: 8.6; 5 TABLET, FILM COATED ORAL at 17:25

## 2023-03-23 RX ADMIN — MIDODRINE HYDROCHLORIDE 10 MG: 5 TABLET ORAL at 07:34

## 2023-03-23 RX ADMIN — SUCRALFATE 1 G: 1 TABLET ORAL at 19:45

## 2023-03-23 RX ADMIN — ONDANSETRON 4 MG: 2 INJECTION INTRAMUSCULAR; INTRAVENOUS at 17:21

## 2023-03-23 RX ADMIN — HYDROMORPHONE HYDROCHLORIDE 0.25 MG: 1 INJECTION, SOLUTION INTRAMUSCULAR; INTRAVENOUS; SUBCUTANEOUS at 12:33

## 2023-03-23 RX ADMIN — FOLIC ACID 1 MG: 1 TABLET ORAL at 17:25

## 2023-03-23 RX ADMIN — HYDROMORPHONE HYDROCHLORIDE 0.25 MG: 1 INJECTION, SOLUTION INTRAMUSCULAR; INTRAVENOUS; SUBCUTANEOUS at 05:47

## 2023-03-23 RX ADMIN — QUETIAPINE FUMARATE 100 MG: 100 TABLET ORAL at 17:29

## 2023-03-23 RX ADMIN — ONDANSETRON 4 MG: 2 INJECTION INTRAMUSCULAR; INTRAVENOUS at 07:34

## 2023-03-23 RX ADMIN — HYDROMORPHONE HYDROCHLORIDE 0.25 MG: 1 INJECTION, SOLUTION INTRAMUSCULAR; INTRAVENOUS; SUBCUTANEOUS at 17:23

## 2023-03-23 RX ADMIN — MIDODRINE HYDROCHLORIDE 10 MG: 5 TABLET ORAL at 17:28

## 2023-03-23 ASSESSMENT — ENCOUNTER SYMPTOMS
HEMOPTYSIS: 0
ORTHOPNEA: 0
TINGLING: 0
ABDOMINAL PAIN: 1
SHORTNESS OF BREATH: 0
BLURRED VISION: 0
NERVOUS/ANXIOUS: 0
TREMORS: 0
NECK PAIN: 0
SPUTUM PRODUCTION: 0
COUGH: 0
BACK PAIN: 0
FEVER: 0
MYALGIAS: 0
PALPITATIONS: 0
WEIGHT LOSS: 0
CLAUDICATION: 0
DEPRESSION: 0
HEADACHES: 0
SENSORY CHANGE: 0
CHILLS: 0
DIZZINESS: 0

## 2023-03-23 ASSESSMENT — PAIN DESCRIPTION - PAIN TYPE
TYPE: ACUTE PAIN

## 2023-03-23 ASSESSMENT — LIFESTYLE VARIABLES: SUBSTANCE_ABUSE: 0

## 2023-03-23 NOTE — PROGRESS NOTES
Hospital Medicine Daily Progress Note    Date of Service  3/23/2023    Chief Complaint  Mary Martinez is a 52 y.o. female admitted 3/22/2023 with abdominal pain    Hospital Course  No notes on file    Interval Problem Update    Patient is emaciated-Case discussed with dietary re: protein supplements    Pain is epigastric /mid abdomen,intensity 6 out of 10 sharp pain, intermittent    Patient was noted to have left-sided kidney stone with evidence of mild hydronephrosis.    I have consulted urology Dr. Snow    I have Made the patient n.p.o.    I have started IV fluids    Urology has seen this patient and recommended CAT scan of the abdomen and a Sullivan placement    We will reach out to urology in regards to when we can reinitiate diet at this time    I have discussed this patient's plan of care and discharge plan at IDT rounds today with Case Management, Nursing, Nursing leadership, and other members of the IDT team.    Consultants/Specialty  urology    Code Status  DNAR/DNI    Disposition  Patient is not medically cleared for discharge.   Anticipate discharge to to home with close outpatient follow-up.  I have placed the appropriate orders for post-discharge needs.    Review of Systems  Review of Systems   Constitutional:  Negative for chills, fever and weight loss.   HENT:  Negative for ear discharge, ear pain, hearing loss and tinnitus.    Eyes:  Negative for blurred vision.   Respiratory:  Negative for cough, hemoptysis, sputum production and shortness of breath.    Cardiovascular:  Negative for chest pain, palpitations, orthopnea and claudication.   Gastrointestinal:  Positive for abdominal pain.   Genitourinary:  Negative for dysuria, frequency and hematuria.   Musculoskeletal:  Negative for back pain, myalgias and neck pain.   Neurological:  Negative for dizziness, tingling, tremors, sensory change and headaches.   Psychiatric/Behavioral:  Negative for depression, substance abuse and suicidal ideas.  The patient is not nervous/anxious.       Physical Exam  Temp:  [36.9 °C (98.5 °F)-37.5 °C (99.5 °F)] 36.9 °C (98.5 °F)  Pulse:  [] 74  Resp:  [18] 18  BP: ()/() 95/62  SpO2:  [92 %-98 %] 95 %    Physical Exam  Constitutional:       Appearance: She is ill-appearing.      Comments: Thin   Eyes:      General: No scleral icterus.        Left eye: No discharge.      Extraocular Movements: Extraocular movements intact.      Pupils: Pupils are equal, round, and reactive to light.   Cardiovascular:      Rate and Rhythm: Normal rate and regular rhythm.      Heart sounds: No murmur heard.    No friction rub. No gallop.   Pulmonary:      Effort: Pulmonary effort is normal. No respiratory distress.      Breath sounds: No stridor. No wheezing, rhonchi or rales.   Chest:      Chest wall: No tenderness.   Abdominal:      Tenderness: There is abdominal tenderness (Mild mid abdomen).   Musculoskeletal:         General: No swelling, tenderness, deformity or signs of injury. Normal range of motion.      Cervical back: Normal range of motion.      Right lower leg: No edema.      Left lower leg: No edema.   Skin:     General: Skin is warm.      Capillary Refill: Capillary refill takes 2 to 3 seconds.      Coloration: Skin is not jaundiced or pale.      Findings: No bruising, erythema or lesion.   Neurological:      General: No focal deficit present.      Cranial Nerves: No cranial nerve deficit.   Psychiatric:         Mood and Affect: Mood normal.       Fluids  No intake or output data in the 24 hours ending 03/23/23 1045    Laboratory  Recent Labs     03/22/23  1224 03/23/23  0025   WBC 7.9 7.1   RBC 4.07* 3.03*   HEMOGLOBIN 12.3 9.1*   HEMATOCRIT 37.2 29.1*   MCV 91.4 96.0   MCH 30.2 30.0   MCHC 33.1* 31.3*   RDW 50.3* 53.1*   PLATELETCT 637* 498*   MPV 9.7 9.7     Recent Labs     03/22/23  1224 03/22/23  1724 03/23/23  0025   SODIUM 137 136 137   POTASSIUM 4.0 3.7 3.8   CHLORIDE 105 106 112   CO2 15* 15* 15*    GLUCOSE 105* 92 85   BUN 10 10 9   CREATININE 0.77 0.66 0.60   CALCIUM 9.7 8.7 8.5                   Imaging  US-RENAL   Final Result         1.  Mild left hydronephrosis.   2.  Echogenic bilateral kidneys, appearance suggests underlying medical renal disease.      [id number] VIEW   Final Result      Nonobstructive gas pattern.   6 mm kidney stone within the left lower pole.           Assessment/Plan  * Intractable abdominal pain- (present on admission)  Assessment & Plan  Multiple admissions over the past several months for the same issue  CT scan of abdomen in Feb 2023 demonstrated post-operative cholecystectomy changes and bilat non-obstructive renal calculi   Lipase normal at 33  HAGMA noted on initial labs likely secondary to prolonged intractable nausea/vomiting, improved after IV fluid boluses per ERP    Admit to observation status for IV fluid hydration and pain management  Multimodal pain management including continuing patient's home oxycodone and IV Dilaudid for breakthrough pain  Continue IV fluids  Trend CMP  Monitor for electrolyte imbalances  Bowel management protocol      Obstructive uropathy- (present on admission)  Assessment & Plan  CT abdomen pelvis has been ordered on 3/23/2023 for further characterization    Sullivan catheter placement    IV fluid hydration    Urology following    Thrombocytosis- (present on admission)  Assessment & Plan  Platelets 637, up from 242 in February 2023.    Likely secondary to severe dehydration in the setting of poor oral intake and prolonged nausea and vomiting    Repeat CBC in the morning and continue to trend  IV fluid hydration    Hypotension- (present on admission)  Assessment & Plan  Hypotensive/soft blood pressures at baseline  BP noted to be 122/103 on arrival    Continue home midodrine TID with meals, hold for SBP greater than 120  Notify provider for SBP less than 90 or MAP less than 60  Vital signs every 4 hours    Severe protein-calorie malnutrition  (HCC)- (present on admission)  Assessment & Plan  BMI 17.94  Poor oral intake x4 days with limited oral intake times several months    Advance diet as tolerated   dietary supplements such as boost      Renal calculus- (present on admission)  Assessment & Plan  Incidental finding of 6 mm kidney stone within the left lower pole of the left kidney on abdominal x-ray  CT scans in February demonstrated bilateral nonobstructive kidney stones  Urinalysis negative for occult blood      Multimodal pain control with p.o. oxycodone and hydromorphone IV for severe breakthrough pain    Intractable nausea and vomiting- (present on admission)  Assessment & Plan  Patient reports this is aggravated by dietary intake and that she is limited to bake chicken breast and mashed potatoes, otherwise most other foods precipitate episodes of intractable nausea and vomiting  Has been unable to tolerate any oral intake over the past 4 days  Noted to have high anion gap metabolic acidosis on arrival secondary to persistent vomiting, improved with IV  Boluses given in ED    Continuous IV hydration  Encourage oral intake    Consider dietary supplements such as boost if patient is able to tolerate  Discussed slow introduction of foods once outpatient -introduce foods 1 at a time over extended periods to determine patient's dietary triggers  IV antiemetics as needed    Continue home Prilosec and Carafate    Rheumatoid arthritis (HCC)- (present on admission)  Assessment & Plan  Continue home oxycodone  Multi-modal pain management  Consider adjunct pain management therapy with gabapentin versus Lyrica  Follow-up with outpatient rheumatologist for further monitoring and management      Tobacco dependence- (present on admission)  Assessment & Plan  Continues to smoke 1 pack/day and verbalizes she is not ready to quit  Nicotine patches  Nicorette gum  Smoking cessation encouraged         VTE prophylaxis: SCDs/TEDs    I have performed a physical exam and  reviewed and updated ROS and Plan today (3/23/2023). In review of yesterday's note (3/22/2023), there are no changes except as documented above.

## 2023-03-23 NOTE — ASSESSMENT & PLAN NOTE
Platelets 637, up from 242 in February 2023.    Likely secondary to severe dehydration in the setting of poor oral intake and prolonged nausea and vomiting    Repeat CBC in the morning and continue to trend  IV fluid hydration

## 2023-03-23 NOTE — DISCHARGE PLANNING
Case Management Discharge Planning    Admission Date: 3/22/2023  GMLOS:    ALOS: 0    6-Clicks ADL Score:    6-Clicks Mobility Score:    PT and/or OT Eval ordered: No  Post-acute Referrals Ordered: No  Post-acute Choice Obtained: No  Has referral(s) been sent to post-acute provider:  No      Anticipated Discharge Dispo: Discharge Disposition: Discharged to home/self care (01)  Discharge Address: [address] [place] [address]  Discharge Contact Phone Number: [phone]    DME Needed: No, patient has own walker, wheelchair, and bedside commode at home.    Action(s) Taken: Updated Provider/Nurse on Discharge Plan    Escalations Completed: Provider and Bedside RN    Medically Clear: No    Next Steps: Continue to monitor for changes and update discharge plan accordingly. Follow-up with the Attending and Bedside RN and assist as needed.    Barriers to Discharge: Medical clearance and Pending Procedures    Is the patient up for discharge tomorrow: No

## 2023-03-23 NOTE — ASSESSMENT & PLAN NOTE
BMI 17.94  Poor oral intake x4 days with limited oral intake times several months    Advance diet as tolerated   dietary supplements such as boost

## 2023-03-23 NOTE — DISCHARGE PLANNING
DELIA Rodriguez met with pt bedside to offer Community Care Management services.   Housing: No barriers. Pt reports to be living with  and kids.  Transportation: No barriers. Pt reports to be an active  as well as sometimes receives rides from her  or children.   Food: No barriers.  Finances: No barriers.  PCP Follow up Appointment: CHW to schedule once pt is discharged.   DELIA Rodriguez provided CCM contact information and encouraged pt to call if anything was needed. CHW will contact pt post discharge to follow up on Follow up appointment and provide any needed resources.

## 2023-03-23 NOTE — ED NOTES
2nd attempt made to call report to CDU. CDU again refusing report. Transporter waited over 20min. Report to LEANNE Glez.

## 2023-03-23 NOTE — ASSESSMENT & PLAN NOTE
Hypotensive/soft blood pressures at baseline  BP noted to be 122/103 on arrival    Continue home midodrine TID with meals, hold for SBP greater than 120  Notify provider for SBP less than 90 or MAP less than 60  Vital signs every 4 hours

## 2023-03-23 NOTE — ASSESSMENT & PLAN NOTE
Continue home oxycodone  Multi-modal pain management  Consider adjunct pain management therapy with gabapentin versus Lyrica  Follow-up with outpatient rheumatologist for further monitoring and management

## 2023-03-23 NOTE — ASSESSMENT & PLAN NOTE
Continues to smoke 1 pack/day and verbalizes she is not ready to quit  Nicotine patches  Nicorette gum  Smoking cessation encouraged

## 2023-03-23 NOTE — ASSESSMENT & PLAN NOTE
Incidental finding of 6 mm kidney stone within the left lower pole of the left kidney on abdominal x-ray  CT scans in February demonstrated bilateral nonobstructive kidney stones  Urinalysis negative for occult blood      Multimodal pain control with p.o. oxycodone and hydromorphone IV for severe breakthrough pain

## 2023-03-23 NOTE — CONSULTS
UROLOGY Consult Note:    Mitchell Clifford P.A.-C.  Date & Time note created:    3/23/2023   10:51 AM     Referring MD:  Arnulfo Bustillos    Patient ID:   Name:             Mary Martinez   YOB: 1970  Age:                 52 y.o.  female   MRN:               [id number]                                                             Reason for Consult:      Abdominal pain, left renal stone    History of Present Illness:    Consult requested for this pleasant 53 yo female. She reports a 2 year history of persistent abdominal pain described as upper mid epigastrium location associated with nausea and vomiting with onset of the most severe symptoms after meals. This has been investigated extensively with prior surgical intervention for appendectomy and cholecystectomy. This unfortunately was not helpful for her symptoms. She reports newer onset of left sided lower abdominal pains along with left flank pains and this is what brought her to the ER for this admission. She is aware of prior kidney stones during prior abdominal pain investigation. She has not passed any stones in the past. She does report difficulty with bladder emptying and slowness to her urinary stream.     Review of Systems:      Constitutional: Denies fevers   Eyes: Denies changes in vision   Ears/Nose/Throat/Mouth: Denies nasal congestion   Cardiovascular: no chest pain   Respiratory: no shortness of breath   Gastrointestinal/Hepatic: abdominal pain   Genitourinary: Denies hematuria, dysuria or frequency  Musculoskeletal/Rheum: oint pain   Skin: Denies rash  Neurological: Denies headache   Psychiatric: denies mood disorder   Endocrine: Cintia thyroid problems  Heme/Oncology/Lymph Nodes: Denies enlarged lymph nodes   All other systems were reviewed and are negative (AMA/CMS criteria)                Past Medical History:   Past Medical History:   Diagnosis Date    Acute renal failure (ARF) (HCC)     Allergy     Anemia     Anxiety      Arthritis     Rheumatoid -follows with rheumatologist. Has several fractures due to RA.    ASTHMA     inhalers prn    Blood transfusion without reported diagnosis     Bowel habit changes     4/24/20-constipation and diarrhea.    Bronchitis last approx 2018    Chronic pain 04/24/2020    Due to RA    Dental disorder     no teeth upper-does not wear her denture. Broken teeth on bottom.    Depression     GERD (gastroesophageal reflux disease)     GIB (gastrointestinal bleeding)     Head ache     Heart burn     taking famotidine    Hiatus hernia syndrome     History of pancreatitis     Hypokalemia     Hyponatremia     Idiopathic chronic pancreatitis (HCC)     Indigestion     taking famotidine    Intractable nausea and vomiting     Kidney disease     Pain     CPS-everywhere    Pneumonia 10/2019    PONV (postoperative nausea and vomiting)     Psychiatric problem     anxiety and depression    Rheumatoid aortitis     Rheumatoid arthritis(714.0) 2003    SMAS (superior mesenteric artery syndrome) ([facility])     Substance abuse ([facility])      Active Hospital Problems    Diagnosis     Intractable abdominal pain [R10.9]     Thrombocytosis [D75.839]     Hypotension [I95.9]     Severe protein-calorie malnutrition (HCC) [E43]     Intractable nausea and vomiting [R11.2]     Renal calculus [N20.0]     Rheumatoid arthritis (HCC) [M06.9]     Tobacco dependence [F17.200]        Past Surgical History:  Past Surgical History:   Procedure Laterality Date    SD UPPER GI ENDOSCOPY,DIAGNOSIS N/A 9/9/2022    Procedure: ENDOSCOPIC ULTRASOUND- UPPER;  Surgeon: Jorge Brooke M.D.;  Location: [facility];  Service: EUS    EGD W/ENDOSCOPIC ULTRASOUND N/A 9/9/2022    Procedure: EGD, WITH ENDOSCOPIC US;  Surgeon: Jorge Brooke M.D.;  Location: [facility];  Service: EUS    SD ENDOSCOPIC US EXAM, ESOPH  4/29/2020    Procedure: EGD, WITH ENDOSCOPIC US;  Surgeon: Jorge Brooke M.D.;  Location: [facility];   Service: Gastroenterology    GASTROSCOPY-ENDO  4/29/2020    Procedure: GASTROSCOPY;  Surgeon: Jorge Brooke M.D.;  Location: SURGERY [facility];  Service: Gastroenterology    EGD WITH ASP/BX  4/29/2020    Procedure: EGD, WITH ASPIRATION BIOPSY - POSS FNA;  Surgeon: Jorge Brooke M.D.;  Location: [facility];  Service: Gastroenterology    KS EXPLORATORY OF ABDOMEN N/A 10/4/2019    Procedure: LAPAROTOMY, EXPLORATORY AND REPAIR OF DUODENAL PERFORATION;  Surgeon: James Dumont M.D.;  Location: [facility];  Service: General    COLONOSCOPY  2018    with upper endoscopy    FINGER ARTHROPLASTY Right 6/5/2017    Procedure: FINGER ARTHROPLASTY - LONG, RING AND SMALL VOLAR PLATE;  Surgeon: Lobo Rosen M.D.;  Location: SURGERY SAME DAY [facility];  Service:     FINGER AMPUTATION  6/5/2017    Procedure: FINGER AMPUTATION - LONG, RING AND SMALL AT THE PROXIMAL INTERPHALANGEAL JOINT;  Surgeon: Lobo Rosen M.D.;  Location: SURGERY SAME DAY [facility];  Service:     FINGER ARTHROPLASTY Right 4/17/2017    Procedure: FINGER ARTHROPLASTY ;  Surgeon: Lobo Rosen M.D.;  Location: SURGERY SAME DAY [facility];  Service:     FINGER AMPUTATION Right 9/14/2016    Procedure: FINGER AMPUTATION INDEX;  Surgeon: Lobo Rosen M.D.;  Location: SURGERY SAME DAY [facility];  Service:     IRRIGATION & DEBRIDEMENT ORTHO Right 9/11/2016    Procedure: IRRIGATION & DEBRIDEMENT ORTHO - right index finger;  Surgeon: Madhu Rosen M.D.;  Location: SURGERY [facility];  Service:     FUSION, PIP JOINT, TOE Right 8/29/2016    Procedure: RE-DO INDEX FINGER PROXIMAL INTERPHALANGEAL ARTHRODESIS;  Surgeon: Lobo Rosen M.D.;  Location: SURGERY SAME DAY [facility];  Service:     BONE GRAFT Right 8/29/2016    Procedure: BONE GRAFT - DISTAL RADIUS ;  Surgeon: Lobo Rosen M.D.;  Location: SURGERY SAME DAY [facility];  Service:      ARTHRODESIS Right 5/6/2016    Procedure: ARTHRODESIS INDEX FINGER PROXIMAL INTERPHALANGEAL;  Surgeon: Lobo Rosen M.D.;  Location: SURGERY SAME DAY [facility];  Service:     FOOT RECONSTRUCTION RHEUMATIC Left 7/29/2015    Procedure: FOOT RECONSTRUCTION RHEUMATIC;  Surgeon: Heriberto Alves M.D.;  Location: SURGERY [facility];  Service:     TOE FUSION Right 5/27/2015    Procedure: TOE FUSION 1ST METATARSALPHALANGEAL JOINT;  Surgeon: Heriberto Alves M.D.;  Location: SURGERY [facility];  Service:     BONE SPUR EXCISION  5/27/2015    Procedure: BONE SPUR EXCISION METATARSAL HEAD 2-3;  Surgeon: Heriberto Alves M.D.;  Location: SURGERY [facility];  Service:     HAMMERTOE CORRECTION  5/27/2015    Procedure: HAMMERTOE CORRECTION AND SOFT TISSUE RE-ALINGMENT 2-3 ;  Surgeon: Heriberto Alves M.D.;  Location: SURGERY [facility];  Service:     DENTAL EXTRACTION(S)  2014    all of upper teeth    ABDOMINAL ABSCESS DRAINAGE  9/27/2011    Performed by VERO WRIGHT at SURGERY [facility]    EMANUEL BY LAPAROSCOPY  9/27/2011    Performed by VERO WRIGHT at [facility]    APPENDECTOMY  2011    Found out it had ruptured prior to abcess surgery    REPEAT C SECTION  2008    REPEAT C SECTION  2005    REPEAT C SECTION  1999    PRIMARY C SECTION  1991    TONSILLECTOMY  1982    WRIST EXPLORATION Left 1980's    fractured wrist-no hardware       Hospital Medications:    Current Facility-Administered Medications:     senna-docusate (PERICOLACE or SENOKOT S) 8.6-50 MG per tablet 2 Tablet, 2 Tablet, Oral, BID **AND** polyethylene glycol/lytes (MIRALAX) PACKET 1 Packet, 1 Packet, Oral, QDAY PRN **AND** magnesium hydroxide (MILK OF MAGNESIA) suspension 30 mL, 30 mL, Oral, QDAY PRN **AND** bisacodyl (DULCOLAX) suppository 10 mg, 10 mg, Rectal, QDAY PRN, Brianna Bustillos, A.P.R.N.    NS infusion, , Intravenous, Continuous, Brianna Bustillos A.P.R.N., Last Rate: 100 mL/hr at 03/22/23 2002, 1,000 mL at  03/22/23 2002    acetaminophen (Tylenol) tablet 650 mg, 650 mg, Oral, Q6HRS PRN, Brianna Bustillos, A.P.R.N.    Notify provider if pain remains uncontrolled, , , CONTINUOUS **AND** Use the Numeric Rating Scale (NRS), Brennan-Baker Faces (WBF), or FLACC on regular floors and Critical-Care Pain Observation Tool (CPOT) on ICUs/Trauma to assess pain, , , CONTINUOUS **AND** Pulse Ox, , , CONTINUOUS **AND** Pharmacy Consult Request ...Pain Management Review 1 Each, 1 Each, Other, PHARMACY TO DOSE **AND** If patient difficult to arouse and/or has respiratory depression (respiratory rate of 10 or less), stop any opiates that are currently infusing and call a Rapid Response., , , CONTINUOUS, Brianna Bustillos, A.P.R.N.    oxyCODONE immediate-release (ROXICODONE) tablet 2.5 mg, 2.5 mg, Oral, Q3HRS PRN **OR** oxyCODONE immediate-release (ROXICODONE) tablet 5 mg, 5 mg, Oral, Q3HRS PRN, 5 mg at 03/23/23 0435 **OR** HYDROmorphone (Dilaudid) injection 0.25 mg, 0.25 mg, Intravenous, Q3HRS PRN, Brianna Bustillos, A.P.R.N., 0.25 mg at 03/23/23 0547    ondansetron (ZOFRAN) syringe/vial injection 4 mg, 4 mg, Intravenous, Q4HRS PRN, Brianna Bustillos, A.P.R.N., 4 mg at 03/23/23 0734    ondansetron (ZOFRAN ODT) dispertab 4 mg, 4 mg, Oral, Q4HRS PRN, Brianna Bustillos, A.P.R.N.    promethazine (PHENERGAN) tablet 12.5-25 mg, 12.5-25 mg, Oral, Q4HRS PRN, Brianna Bustillos, A.P.R.N.    promethazine (PHENERGAN) suppository 12.5-25 mg, 12.5-25 mg, Rectal, Q4HRS PRN, Brianna Bustillos, A.P.R.N.    prochlorperazine (COMPAZINE) injection 5-10 mg, 5-10 mg, Intravenous, Q4HRS PRN, Brianna Bustillos, A.P.R.N.    nicotine (NICODERM) 21 MG/24HR 21 mg, 21 mg, Transdermal, Daily-0600, 21 mg at 03/23/23 0509 **AND** Nicotine Replacement Patient Education Materials, , , Once **AND** nicotine polacrilex (NICORETTE) 2 MG piece 2 mg, 2 mg, Oral, Q HOUR PRN, Brianna Bustillos, A.P.R.N.    cyanocobalamin (VITAMIN B-12) tablet 1,000 mcg, 1,000 mcg, Oral, QDAY with Breakfast, Brianna Bustillos A.P.R.N., 1,000 mcg  at 03/23/23 0734    DULoxetine (CYMBALTA) capsule 30 mg, 30 mg, Oral, DAILY, Brianna Bustillos, A.P.R.N., 30 mg at 03/23/23 0510    folic acid (FOLVITE) tablet 1 mg, 1 mg, Oral, BID, Brianna Bustillos, A.P.R.N., 1 mg at 03/23/23 0509    midodrine (PROAMATINE) tablet 10 mg, 10 mg, Oral, TID WITH MEALS, Brianna Bustillos, A.P.R.N., 10 mg at 03/23/23 0734    omeprazole (PRILOSEC) capsule 20 mg, 20 mg, Oral, DAILY, Brianna Bustillos, A.P.R.N., 20 mg at 03/23/23 0509    PARoxetine (PAXIL) tablet 60 mg, 60 mg, Oral, Q EVENING, Brianna Bustillos, A.P.R.N.    QUEtiapine (Seroquel) tablet 100 mg, 100 mg, Oral, Q EVENING, Brianna Bustillos, A.P.R.N.    sucralfate (CARAFATE) tablet 1 g, 1 g, Oral, 4X/DAY ACHS, Brianna Bustillos, A.P.R.N.    Current Outpatient Medications:  Medications Prior to Admission   Medication Sig Dispense Refill Last Dose    DULoxetine (CYMBALTA) 30 MG Cap DR Particles Take 30 mg by mouth every day.   3/21/2023 at AM    oxyCODONE immediate-release (ROXICODONE) 5 MG Tab Take 1 Tablet by mouth 3 times a day as needed for Severe Pain for up to 30 days. 90 Tablet 0 3/22/2023 at AM    Naloxone (NARCAN) 4 MG/0.1ML Liquid One spray in one nostril for overdose and call 911. 1 Each 1 HAS ON HAND    PARoxetine (PAXIL) 30 MG Tab Take 60 mg by mouth every evening. 2 tablet (60 mg)   3/21/2023 at PM    multivitamin Tab Take 1 Tablet by mouth every day. 30 Tablet 2 3/22/2023 at AM    cyanocobalamin (VITAMIN B12) 1000 MCG Tab Take 1 Tablet by mouth every morning with breakfast. 30 Tablet 3 3/22/2023 at AM    folic acid (FOLVITE) 1 MG Tab Take 1 Tablet by mouth 2 times a day. 30 Tablet 3 3/22/2023 at AM    midodrine (PROAMATINE) 10 MG tablet Take 1 Tablet by mouth 3 times a day with meals. 60 Tablet 1 3/22/2023 at AM    QUEtiapine (SEROQUEL) 100 MG Tab Take 1 Tablet by mouth every evening. 60 Tablet 3 3/21/2023 at PM    omeprazole (PRILOSEC) 20 MG delayed-release capsule Take 1 Capsule by mouth every day. 30 Capsule 1 3/22/2023 at AM    sucralfate  (CARAFATE) 1 GM Tab Take 1 Tablet by mouth 4 Times a Day,Before Meals and at Bedtime. 120 Tablet 3 3/22/2023 at AM       Medication Allergy:  Allergies   Allergen Reactions    Penicillins Anaphylaxis and Hives     Tolerates cephalosporins; reports throat swelling with PCN    Abilify Unspecified     Multiple side effects    Aripiprazole Nausea     Spasms, shaking      Nitrous Oxide Vomiting    Citrus Rash and Itching     Only on Skin Contact       Family History:  Family History   Problem Relation Age of Onset    Cancer Mother     Heart Disease Mother     Hypertension Mother     Heart Disease Father     Hypertension Father        Social History:  Social History     Socioeconomic History    Marital status:      Spouse name: Ousmane    Number of children: 5    Years of education: Not on file    Highest education level: Not on file   Occupational History    Not on file   Tobacco Use    Smoking status: Every Day     Packs/day: 1.00     Years: 30.00     Pack years: 30.00     Types: Cigarettes    Smokeless tobacco: Never   Vaping Use    Vaping Use: Never used   Substance and Sexual Activity    Alcohol use: Never    Drug use: Never    Sexual activity: Not Currently   Other Topics Concern     Service No    Blood Transfusions Yes    Caffeine Concern No    Occupational Exposure No    Hobby Hazards No    Sleep Concern No    Stress Concern Yes    Weight Concern No    Special Diet No    Back Care No    Exercise Yes    Bike Helmet Yes    Seat Belt Yes    Self-Exams Yes   Social History Narrative    ** Merged History Encounter **          Social Determinants of Health     Financial Resource Strain: Low Risk     Difficulty of Paying Living Expenses: Not hard at all   Food Insecurity: No Food Insecurity    Worried About Running Out of Food in the Last Year: Never true    Ran Out of Food in the Last Year: Never true   Transportation Needs: No Transportation Needs    Lack of Transportation (Medical): No    Lack of  "Transportation (Non-Medical): No   Physical Activity: Not on file   Stress: Not on file   Social Connections: Not on file   Intimate Partner Violence: Not on file   Housing Stability: Not on file         Physical Exam:  Vitals/ General Appearance:   Weight/BMI: Body mass index is 17.94 kg/m².  BP 95/62   Pulse 74   Temp 36.9 °C (98.5 °F) (Temporal)   Resp 18   Ht 1.651 m (5' 5\")   Wt 48.9 kg (107 lb 12.9 oz)   SpO2 95%   Vitals:    03/22/23 1727 03/22/23 1929 03/23/23 0418 03/23/23 0700   BP: 108/58 107/56 97/60 95/62   Pulse: 93 74 73 74   Resp:  18 18 18   Temp:  37.5 °C (99.5 °F) 37.1 °C (98.8 °F) 36.9 °C (98.5 °F)   TempSrc:  Temporal Temporal Temporal   SpO2: 92% 95% 96% 95%   Weight:  48.9 kg (107 lb 12.9 oz)     Height:  1.651 m (5' 5\")       Oxygen Therapy:  Pulse Oximetry: 95 %, O2 (LPM): 0, O2 Delivery Device: None - Room Air    Constitutional:   Well developed, Well nourished, No acute distress  HENMT:  Normocephalic, Atraumatic, Oropharynx moist mucous membranes, No oral exudates, Nose normal.  No thyromegaly.  Eyes:  EOMI, Conjunctiva normal, No discharge.  Neck:  Normal range of motion, No cervical tenderness.  Cardiovascular:  Normal heart rate, Normal rhythm, No murmurs, No rubs, No gallops.   Extremitites with intact distal pulses, no cyanosis, or edema.  Lungs:  Normal breath sounds, breath sounds clear to auscultation bilaterally,  no crackles, no wheezing.   Abdomen: Bowel sounds normal, Soft, No tenderness, No guarding, No rebound, No masses, No hepatosplenomegaly.  : -CVAT. TTP mid upper epigastrium. No guarding or rebound  Skin: Warm, Dry, No erythema, No rash, no induration.  Neurologic: Alert & oriented x 3, No focal deficits noted.  Psychiatric: Affect normal, Judgment normal, Mood normal.      MDM (Data Review):     Records reviewed and summarized in current documentation    Lab Data Review:  Recent Results (from the past 24 hour(s))   CBC WITH DIFFERENTIAL    Collection Time: " 03/22/23 12:24 PM   Result Value Ref Range    WBC 7.9 4.8 - 10.8 K/uL    RBC 4.07 (L) 4.20 - 5.40 M/uL    Hemoglobin 12.3 12.0 - 16.0 g/dL    Hematocrit 37.2 37.0 - 47.0 %    MCV 91.4 81.4 - 97.8 fL    MCH 30.2 27.0 - 33.0 pg    MCHC 33.1 (L) 33.6 - 35.0 g/dL    RDW 50.3 (H) 35.9 - 50.0 fL    Platelet Count 637 (H) 164 - 446 K/uL    MPV 9.7 9.0 - 12.9 fL    Neutrophils-Polys 68.20 44.00 - 72.00 %    Lymphocytes 22.10 22.00 - 41.00 %    Monocytes 5.80 0.00 - 13.40 %    Eosinophils 2.50 0.00 - 6.90 %    Basophils 0.90 0.00 - 1.80 %    Immature Granulocytes 0.50 0.00 - 0.90 %    Nucleated RBC 0.00 /100 WBC    Neutrophils (Absolute) 5.39 2.00 - 7.15 K/uL    Lymphs (Absolute) 1.75 1.00 - 4.80 K/uL    Monos (Absolute) 0.46 0.00 - 0.85 K/uL    Eos (Absolute) 0.20 0.00 - 0.51 K/uL    Baso (Absolute) 0.07 0.00 - 0.12 K/uL    Immature Granulocytes (abs) 0.04 0.00 - 0.11 K/uL    NRBC (Absolute) 0.00 K/uL   COMP METABOLIC PANEL    Collection Time: 03/22/23 12:24 PM   Result Value Ref Range    Sodium 137 135 - 145 mmol/L    Potassium 4.0 3.6 - 5.5 mmol/L    Chloride 105 96 - 112 mmol/L    Co2 15 (L) 20 - 33 mmol/L    Anion Gap 17.0 (H) 7.0 - 16.0    Glucose 105 (H) 65 - 99 mg/dL    Bun 10 8 - 22 mg/dL    Creatinine 0.77 0.50 - 1.40 mg/dL    Calcium 9.7 8.5 - 10.5 mg/dL    AST(SGOT) 10 (L) 12 - 45 U/L    ALT(SGPT) 8 2 - 50 U/L    Alkaline Phosphatase 150 (H) 30 - 99 U/L    Total Bilirubin 0.2 0.1 - 1.5 mg/dL    Albumin 3.8 3.2 - 4.9 g/dL    Total Protein 8.5 (H) 6.0 - 8.2 g/dL    Globulin 4.7 (H) 1.9 - 3.5 g/dL    A-G Ratio 0.8 g/dL   LIPASE    Collection Time: 03/22/23 12:24 PM   Result Value Ref Range    Lipase 33 11 - 82 U/L   HCG QUAL SERUM    Collection Time: 03/22/23 12:24 PM   Result Value Ref Range    Beta-Hcg Qualitative Serum Negative Negative   CORRECTED CALCIUM    Collection Time: 03/22/23 12:24 PM   Result Value Ref Range    Correct Calcium 9.9 8.5 - 10.5 mg/dL   ESTIMATED GFR    Collection Time: 03/22/23 12:24 PM    Result Value Ref Range    GFR (CKD-EPI) 93 >60 mL/min/1.73 m 2   MAGNESIUM    Collection Time: 23 12:24 PM   Result Value Ref Range    Magnesium 1.9 1.5 - 2.5 mg/dL   PHOSPHORUS    Collection Time: 23 12:24 PM   Result Value Ref Range    Phosphorus 2.6 2.5 - 4.5 mg/dL   TROPONIN    Collection Time: 23 12:24 PM   Result Value Ref Range    Troponin T 15 6 - 19 ng/L   URINALYSIS,CULTURE IF INDICATED    Collection Time: 23  3:40 PM    Specimen: Urine   Result Value Ref Range    Color Yellow     Character Clear     Specific Gravity 1.018 <1.035    Ph 7.0 5.0 - 8.0    Glucose Negative Negative mg/dL    Ketones 15 (A) Negative mg/dL    Protein 30 (A) Negative mg/dL    Bilirubin Negative Negative    Urobilinogen, Urine 0.2 Negative    Nitrite Negative Negative    Leukocyte Esterase Trace (A) Negative    Occult Blood Negative Negative    Micro Urine Req Microscopic    URINE MICROSCOPIC (W/UA)    Collection Time: 23  3:40 PM   Result Value Ref Range    WBC 0-2 /hpf    RBC 0-2 /hpf    Bacteria Negative None /hpf    Epithelial Cells Few /hpf    Hyaline Cast 3-5 (A) /lpf   EKG (Now)    Collection Time: 23  4:29 PM   Result Value Ref Range    Report       [facility]    Test Date:  2023  Pt Name:    STEFFANIE ELLISON                Department: ER  MRN:        [id number]                      Room:       T203  Gender:     Female                       Technician: [id number]  :        1970                   Requested By:DIMA WALL  Order #:    [id number]                    Reading MD: DIMA WALL, DO    Measurements  Intervals                                Axis  Rate:       61                           P:          78  NC:         182                          QRS:        89  QRSD:       107                          T:          82  QT:         415  QTc:        418    Interpretive Statements  Sinus rhythm  Low voltage, precordial  leads  Nonspecific T abnormalities, lateral leads  Baseline wander in lead(s) I,III,aVR,aVL,aVF,V2,V5  Compared to ECG 02/17/2023 17:52:23  T-wave abnormality now present  Electronically Signed On 3- 20:40:45 PDT by DIMA WALL DO     CMP    Collection Time: 03/22/23  5:24 PM   Result Value Ref Range    Sodium 136 135 - 145 mmol/L    Potassium 3.7 3.6 - 5.5 mmol/L    Chloride 106 96 - 112 mmol/L    Co2 15 (L) 20 - 33 mmol/L    Anion Gap 15.0 7.0 - 16.0    Glucose 92 65 - 99 mg/dL    Bun 10 8 - 22 mg/dL    Creatinine 0.66 0.50 - 1.40 mg/dL    Calcium 8.7 8.5 - 10.5 mg/dL    AST(SGOT) 8 (L) 12 - 45 U/L    ALT(SGPT) 6 2 - 50 U/L    Alkaline Phosphatase 128 (H) 30 - 99 U/L    Total Bilirubin 0.2 0.1 - 1.5 mg/dL    Albumin 3.2 3.2 - 4.9 g/dL    Total Protein 7.4 6.0 - 8.2 g/dL    Globulin 4.2 (H) 1.9 - 3.5 g/dL    A-G Ratio 0.8 g/dL   CORRECTED CALCIUM    Collection Time: 03/22/23  5:24 PM   Result Value Ref Range    Correct Calcium 9.3 8.5 - 10.5 mg/dL   ESTIMATED GFR    Collection Time: 03/22/23  5:24 PM   Result Value Ref Range    GFR (CKD-EPI) 106 >60 mL/min/1.73 m 2   MAGNESIUM    Collection Time: 03/22/23  5:24 PM   Result Value Ref Range    Magnesium 1.8 1.5 - 2.5 mg/dL   Magnesium    Collection Time: 03/22/23  8:02 PM   Result Value Ref Range    Magnesium 1.8 1.5 - 2.5 mg/dL   Comp Metabolic Panel (CMP)    Collection Time: 03/23/23 12:25 AM   Result Value Ref Range    Sodium 137 135 - 145 mmol/L    Potassium 3.8 3.6 - 5.5 mmol/L    Chloride 112 96 - 112 mmol/L    Co2 15 (L) 20 - 33 mmol/L    Anion Gap 10.0 7.0 - 16.0    Glucose 85 65 - 99 mg/dL    Bun 9 8 - 22 mg/dL    Creatinine 0.60 0.50 - 1.40 mg/dL    Calcium 8.5 8.5 - 10.5 mg/dL    AST(SGOT) 7 (L) 12 - 45 U/L    ALT(SGPT) 6 2 - 50 U/L    Alkaline Phosphatase 114 (H) 30 - 99 U/L    Total Bilirubin <0.2 0.1 - 1.5 mg/dL    Albumin 2.9 (L) 3.2 - 4.9 g/dL    Total Protein 6.4 6.0 - 8.2 g/dL    Globulin 3.5 1.9 - 3.5 g/dL    A-G Ratio 0.8 g/dL    CBC with Differential    Collection Time: 03/23/23 12:25 AM   Result Value Ref Range    WBC 7.1 4.8 - 10.8 K/uL    RBC 3.03 (L) 4.20 - 5.40 M/uL    Hemoglobin 9.1 (L) 12.0 - 16.0 g/dL    Hematocrit 29.1 (L) 37.0 - 47.0 %    MCV 96.0 81.4 - 97.8 fL    MCH 30.0 27.0 - 33.0 pg    MCHC 31.3 (L) 33.6 - 35.0 g/dL    RDW 53.1 (H) 35.9 - 50.0 fL    Platelet Count 498 (H) 164 - 446 K/uL    MPV 9.7 9.0 - 12.9 fL    Neutrophils-Polys 55.30 44.00 - 72.00 %    Lymphocytes 31.70 22.00 - 41.00 %    Monocytes 8.00 0.00 - 13.40 %    Eosinophils 3.80 0.00 - 6.90 %    Basophils 0.80 0.00 - 1.80 %    Immature Granulocytes 0.40 0.00 - 0.90 %    Nucleated RBC 0.00 /100 WBC    Neutrophils (Absolute) 3.91 2.00 - 7.15 K/uL    Lymphs (Absolute) 2.25 1.00 - 4.80 K/uL    Monos (Absolute) 0.57 0.00 - 0.85 K/uL    Eos (Absolute) 0.27 0.00 - 0.51 K/uL    Baso (Absolute) 0.06 0.00 - 0.12 K/uL    Immature Granulocytes (abs) 0.03 0.00 - 0.11 K/uL    NRBC (Absolute) 0.00 K/uL   CORRECTED CALCIUM    Collection Time: 03/23/23 12:25 AM   Result Value Ref Range    Correct Calcium 9.4 8.5 - 10.5 mg/dL   ESTIMATED GFR    Collection Time: 03/23/23 12:25 AM   Result Value Ref Range    GFR (CKD-EPI) 109 >60 mL/min/1.73 m 2       Imaging/Procedures Review:    Reviewed    MDM (Assessment and Plan):       53 yo with chronic abdominal pain, nausea and vomiting. Prior imaging revealed a  6 mm left renal stone, confirmed by KUB on this admission with left hydronephrosis on EDILMA. She was quite distended in her bladder on the prior CT and notes difficulty with bladder emptying in general. We are going to place a archibald to optimally drain her urinary tract, then will proceed with a non contrast CT of the abdomen and pelvis to evaluate for any obstructive changes. At present she is non toxic with normal renal functions. Case discussed with Dr. Snow who has directed this plan of care.

## 2023-03-23 NOTE — ASSESSMENT & PLAN NOTE
Patient reports this is aggravated by dietary intake and that she is limited to bake chicken breast and mashed potatoes, otherwise most other foods precipitate episodes of intractable nausea and vomiting  Has been unable to tolerate any oral intake over the past 4 days  Noted to have high anion gap metabolic acidosis on arrival secondary to persistent vomiting, improved with IV  Boluses given in ED    Continuous IV hydration  Encourage oral intake    Consider dietary supplements such as boost if patient is able to tolerate  Discussed slow introduction of foods once outpatient -introduce foods 1 at a time over extended periods to determine patient's dietary triggers  IV antiemetics as needed    Continue home Prilosec and Carafate

## 2023-03-23 NOTE — CARE PLAN
The patient is Stable - Low risk of patient condition declining or worsening         Progress made toward(s) clinical / shift goals:          Problem: Pain - Standard  Goal: Alleviation of pain or a reduction in pain to the patient’s comfort goal  Outcome: Progressing     Problem: Knowledge Deficit - Standard  Goal: Patient and family/care givers will demonstrate understanding of plan of care, disease process/condition, diagnostic tests and medications  Outcome: Progressing     Problem: Skin Integrity  Goal: Skin integrity is maintained or improved  Outcome: Progressing

## 2023-03-23 NOTE — PROGRESS NOTES
4 Eyes Skin Assessment Completed by LEE Allen and LEANNE Montoya.    Head WDL  Ears WDL  Nose WDL  Mouth WDL  Neck WDL  Breast/Chest WDL  Shoulder Blades WDL  Spine WDL  (R) Arm/Elbow/Hand Scar  (L) Arm/Elbow/Hand WDL  Abdomen Scar  Groin WDL  Scrotum/Coccyx/Buttocks WDL  (R) Leg WDL  (L) Leg WDL  (R) Heel/Foot/Toe Scab  (L) Heel/Foot/Toe Scab          Devices In Places Pulse Ox      Interventions In Place Pillows    Possible Skin Injury No    Pictures Uploaded Into Epic No, needs to be completed  Wound Consult Placed N/A  RN Wound Prevention Protocol Ordered No

## 2023-03-23 NOTE — H&P
Hospital Medicine History & Physical Note    Date of Service  3/22/2023    Primary Care Physician  Fidencio Christopher D.O.    Consultants  None    Code Status  DNAR/DNI    Chief Complaint  Chief Complaint   Patient presents with    Abdominal Pain     Epigastric pain x1 week.        History of Presenting Illness  Mary Martinez is a 52 y.o. female with an extensive past medical history of frequent hospitalizations for abdominal complaints who presented 3/22/2023 with abdominal pain and intractable nausea and vomiting.  Patient reports that she has a history of chronic abdominal pain for which she has been seen in the hospital on multiple occasions in the past several months.  On the Thursday prior to admission, her abdominal pain began increasing in severity, especially in the epigastric region.  She reports that the pain is dull in nature and radiates into her whole abdomen. The patient states that the pain is aggravated by eating and has no relieving factors. The patient states that over the past several months her oral intake has decreased significantly as multiple foods appear to precipitate these episodes. Over the past 4 days she has not been able to tolerate any PO intake. Denies diarrhea, hematemesis, melena, or chills.     Vital signs on arrival are as follows 98.2 degrees, 104, 18, 122/103, 98% on room air.    In the ED, she was noted to have an elevated platelet count of 637, and anion gap metabolic acidosis of 17 with a CO2 of 15.  Her lipase was negative at 33.  ERP gave IV fluid boluses and rechecked a CMP, which demonstrated improvements in her anion gap down to 15, with a CO2 of 15.  Her urinalysis was remarkable for mild ketosis and proteinuria, but without infectious etiology.  The patient's nausea and vomiting and pain persisted despite ERP's interventions, thus the hospitalist service was consulted for admission to observation status.    I discussed the plan of care with patient and bedside  RN.    Review of Systems  Review of Systems   Constitutional:  Positive for chills, malaise/fatigue and weight loss.   HENT: Negative.     Eyes: Negative.    Respiratory:  Negative for shortness of breath.    Cardiovascular:  Negative for chest pain and leg swelling.   Gastrointestinal:  Positive for abdominal pain, nausea and vomiting. Negative for constipation, diarrhea and melena.   Genitourinary: Negative.    Musculoskeletal:  Negative for myalgias and neck pain.   Skin: Negative.    Neurological:  Negative for dizziness.   Psychiatric/Behavioral: Negative.     All other systems reviewed and are negative.    Past Medical History   has a past medical history of Acute renal failure (ARF) (Formerly [facility]), Allergy, Anemia, Anxiety, Arthritis, ASTHMA, Blood transfusion without reported diagnosis, Bowel habit changes, Bronchitis (last approx 2018), Chronic pain (04/24/2020), Dental disorder, Depression, GERD (gastroesophageal reflux disease), GIB (gastrointestinal bleeding), Head ache, Heart burn, Hiatus hernia syndrome, History of pancreatitis, Hypokalemia, Hyponatremia, Idiopathic chronic pancreatitis (Formerly [facility]), Indigestion, Intractable nausea and vomiting, Kidney disease, Pain, Pneumonia (10/2019), PONV (postoperative nausea and vomiting), Psychiatric problem, Rheumatoid aortitis, Rheumatoid arthritis(714.0) (2003), SMAS (superior mesenteric artery syndrome) (Formerly [facility]), and Substance abuse (Formerly [facility]).    Surgical History   has a past surgical history that includes abdominal abscess drainage (9/27/2011); toe fusion (Right, 5/27/2015); bone spur excision (5/27/2015); hammertoe correction (5/27/2015); foot reconstruction rheumatic (Left, 7/29/2015); arthrodesis (Right, 5/6/2016); fusion, pip joint, toe (Right, 8/29/2016); bone graft (Right, 8/29/2016); irrigation & debridement ortho (Right, 9/11/2016); finger amputation (Right, 9/14/2016); finger arthroplasty (Right, 4/17/2017); finger arthroplasty (Right, 6/5/2017); finger amputation  (6/5/2017); pr exploratory of abdomen (N/A, 10/4/2019); tonsillectomy (1982); primary c section (1991); repeat c section (1999); repeat c section (2005); repeat c section (2008); appendectomy (2011); nicholas by laparoscopy (9/27/2011); wrist exploration (Left, 1980's); colonoscopy (2018); dental extraction(s) (2014); pr endoscopic us exam, esoph (4/29/2020); gastroscopy-endo (4/29/2020); egd with asp/bx (4/29/2020); pr upper gi endoscopy,diagnosis (N/A, 9/9/2022); and egd w/endoscopic ultrasound (N/A, 9/9/2022).     Family History  family history includes Cancer in her mother; Heart Disease in her father and mother; Hypertension in her father and mother.   Family history reviewed with patient. There is no family history that is pertinent to the chief complaint.     Social History   reports that she has been smoking cigarettes. She has a 30.00 pack-year smoking history. She has never used smokeless tobacco. She reports that she does not drink alcohol and does not use drugs.    Allergies  Allergies   Allergen Reactions    Penicillins Anaphylaxis and Hives     Tolerates cephalosporins; reports throat swelling with PCN    Abilify Unspecified     Multiple side effects    Aripiprazole Nausea     Spasms, shaking      Nitrous Oxide Vomiting    Citrus Rash and Itching     Only on Skin Contact       Medications  Prior to Admission Medications   Prescriptions Last Dose Informant Patient Reported? Taking?   DULoxetine (CYMBALTA) 30 MG Cap DR Particles 3/21/2023 at AM Patient Yes Yes   Sig: Take 30 mg by mouth every day.   Naloxone (NARCAN) 4 MG/0.1ML Liquid HAS ON HAND Patient No No   Sig: One spray in one nostril for overdose and call 911.   PARoxetine (PAXIL) 30 MG Tab 3/21/2023 at PM Patient Yes No   Sig: Take 60 mg by mouth every evening. 2 tablet (60 mg)   QUEtiapine (SEROQUEL) 100 MG Tab 3/21/2023 at PM Patient No No   Sig: Take 1 Tablet by mouth every evening.   cyanocobalamin (VITAMIN B12) 1000 MCG Tab 3/22/2023 at AM  Patient No No   Sig: Take 1 Tablet by mouth every morning with breakfast.   folic acid (FOLVITE) 1 MG Tab 3/22/2023 at AM Patient No No   Sig: Take 1 Tablet by mouth 2 times a day.   midodrine (PROAMATINE) 10 MG tablet 3/22/2023 at AM Patient No No   Sig: Take 1 Tablet by mouth 3 times a day with meals.   multivitamin Tab 3/22/2023 at AM Patient No No   Sig: Take 1 Tablet by mouth every day.   omeprazole (PRILOSEC) 20 MG delayed-release capsule 3/22/2023 at AM Patient No No   Sig: Take 1 Capsule by mouth every day.   oxyCODONE immediate-release (ROXICODONE) 5 MG Tab 3/22/2023 at AM Patient No No   Sig: Take 1 Tablet by mouth 3 times a day as needed for Severe Pain for up to 30 days.   sucralfate (CARAFATE) 1 GM Tab 3/22/2023 at AM Patient No No   Sig: Take 1 Tablet by mouth 4 Times a Day,Before Meals and at Bedtime.      Facility-Administered Medications: None       Physical Exam  Temp:  [37.1 °C (98.7 °F)-37.5 °C (99.5 °F)] 37.5 °C (99.5 °F)  Pulse:  [] 74  Resp:  [18] 18  BP: (106-122)/() 107/56  SpO2:  [92 %-98 %] 95 %  Blood Pressure: 107/56   Temperature: 37.5 °C (99.5 °F)   Pulse: 74   Respiration: 18   Pulse Oximetry: 95 %       Physical Exam  Vitals reviewed.   Constitutional:       Appearance: She is cachectic. She is ill-appearing (Chronically).   HENT:      Head: Normocephalic and atraumatic.      Nose: Nose normal.      Mouth/Throat:      Mouth: Mucous membranes are dry.   Eyes:      Extraocular Movements: Extraocular movements intact.      Pupils: Pupils are equal, round, and reactive to light.   Cardiovascular:      Rate and Rhythm: Normal rate and regular rhythm.      Pulses: Normal pulses.      Heart sounds: Normal heart sounds. No murmur heard.  Pulmonary:      Effort: Pulmonary effort is normal. No respiratory distress.      Breath sounds: Normal breath sounds.   Abdominal:      General: Abdomen is flat. Bowel sounds are normal.      Palpations: Abdomen is soft.      Tenderness: There  is generalized abdominal tenderness and tenderness in the epigastric area.   Musculoskeletal:         General: Normal range of motion.      Comments: Missing multiple fingers bilaterally   Skin:     General: Skin is warm and dry.      Capillary Refill: Capillary refill takes less than 2 seconds.   Neurological:      General: No focal deficit present.      Mental Status: She is alert and oriented to person, place, and time.   Psychiatric:         Mood and Affect: Mood normal.         Behavior: Behavior normal.       Laboratory:  Recent Labs     03/22/23  1224   WBC 7.9   RBC 4.07*   HEMOGLOBIN 12.3   HEMATOCRIT 37.2   MCV 91.4   MCH 30.2   MCHC 33.1*   RDW 50.3*   PLATELETCT 637*   MPV 9.7     Recent Labs     03/22/23  1224 03/22/23  1724   SODIUM 137 136   POTASSIUM 4.0 3.7   CHLORIDE 105 106   CO2 15* 15*   GLUCOSE 105* 92   BUN 10 10   CREATININE 0.77 0.66   CALCIUM 9.7 8.7     Recent Labs     03/22/23  1224 03/22/23  1724   ALTSGPT 8 6   ASTSGOT 10* 8*   ALKPHOSPHAT 150* 128*   TBILIRUBIN 0.2 0.2   LIPASE 33  --    GLUCOSE 105* 92         No results for input(s): NTPROBNP in the last 72 hours.      Recent Labs     03/22/23  1224   TROPONINT 15       Imaging:  [id number]   Final Result      Nonobstructive gas pattern.   6 mm kidney stone within the left lower pole.      US-RENAL    (Results Pending)       X-Ray:  I have personally reviewed the images and compared with prior images. and My impression is: There is no evidence of obstructive bowel gas, with evidence of a kidney stone in the left lower pole.    Assessment/Plan:  Justification for Admission Status  I anticipate this patient is appropriate for observation status at this time because she will need IV fluid hydration and pain management    Patient will need a Med/Surg bed on MEDICAL service . The need is secondary to as below.    * Intractable abdominal pain- (present on admission)  Assessment & Plan  Multiple admissions over the past several  months for the same issue  CT scan of abdomen in Feb 2023 demonstrated post-operative cholecystectomy changes and bilat non-obstructive renal calculi   Lipase normal at 33  HAGMA noted on initial labs likely secondary to prolonged intractable nausea/vomiting, improved after IV fluid boluses per ERP    Admit to observation status for IV fluid hydration and pain management  Multimodal pain management including continuing patient's home oxycodone and IV Dilaudid for breakthrough pain  Continue IV fluids  Trend CMP  Monitor for electrolyte imbalances  Bowel management protocol  Dietary consult placed given poor oral intake  Remains afebrile at this time, with normal WBC, normal UA.  Consider IV antibiotics if patient develops infectious symptoms such as leukocytosis or fever    Intractable nausea and vomiting- (present on admission)  Assessment & Plan  Patient reports this is aggravated by dietary intake and that she is limited to bake chicken breast and mashed potatoes, otherwise most other foods precipitate episodes of intractable nausea and vomiting  Has been unable to tolerate any oral intake over the past 4 days  Noted to have high anion gap metabolic acidosis on arrival secondary to persistent vomiting, improved with IV  Boluses given in ED    Continuous IV hydration  Encourage oral intake  Dietary consult placed given poor oral intake and failure to thrive  Consider dietary supplements such as boost if patient is able to tolerate  Discussed slow introduction of foods once outpatient -introduce foods 1 at a time over extended periods to determine patient's dietary triggers  IV antiemetics as needed  Advance diet as tolerated to low fiber GI soft, pending dietary recommendations  Continue home Prilosec and Carafate    Thrombocytosis- (present on admission)  Assessment & Plan  Platelets 637, up from 242 in February 2023.    Likely secondary to severe dehydration in the setting of poor oral intake and prolonged  nausea and vomiting    Repeat CBC in the morning and continue to trend  IV fluid hydration    Hypotension- (present on admission)  Assessment & Plan  Hypotensive/soft blood pressures at baseline  BP noted to be 122/103 on arrival    Continue home midodrine TID with meals, hold for SBP greater than 120  Notify provider for SBP less than 90 or MAP less than 60  Vital signs every 4 hours    Severe protein-calorie malnutrition (HCC)- (present on admission)  Assessment & Plan  BMI 17.94  Poor oral intake x4 days with limited oral intake times several months  Dietary consult pending  Advance diet as tolerated  Consider dietary supplements such as boost  Consider GI consult versus IR consult for placement of PEG tube for long-term calorie supplementation with tube feeding    Renal calculus  Assessment & Plan  Incidental finding of 6 mm kidney stone within the left lower pole of the left kidney on abdominal x-ray  CT scans in February demonstrated bilateral nonobstructive kidney stones  Urinalysis negative for occult blood    Renal ultrasound ordered and pending  Multimodal pain control with p.o. oxycodone and hydromorphone IV for severe breakthrough pain    Rheumatoid arthritis (HCC)- (present on admission)  Assessment & Plan  Continue home oxycodone  Multi-modal pain management  Consider adjunct pain management therapy with gabapentin versus Lyrica  Follow-up with outpatient rheumatologist for further monitoring and management      Tobacco dependence- (present on admission)  Assessment & Plan  Continues to smoke 1 pack/day and verbalizes she is not ready to quit  Nicotine patches  Nicorette gum  Smoking cessation encouraged      VTE prophylaxis: SCDs/TEDs and pharmacologic prophylaxis contraindicated due to estimated short length of stay and pending renal ultrasound    -----------------------------------------------------------------------------------------------------------------------  Please note that this dictation  was created using voice recognition software. I have made every reasonable attempt to correct obvious errors, but there may be errors of grammar and possibly content that I did not discover before finalizing the note.    Electronically signed by:  Jewel Bustillos DNP, APRN, PATRICIAP-BC  Hospitalist Services  [facility]  03/22/23  11:55 PM

## 2023-03-23 NOTE — ASSESSMENT & PLAN NOTE
Multiple admissions over the past several months for the same issue  CT scan of abdomen in Feb 2023 demonstrated post-operative cholecystectomy changes and bilat non-obstructive renal calculi   Lipase normal at 33  HAGMA noted on initial labs likely secondary to prolonged intractable nausea/vomiting, improved after IV fluid boluses per ERP    Admit to observation status for IV fluid hydration and pain management  Multimodal pain management including continuing patient's home oxycodone and IV Dilaudid for breakthrough pain  Continue IV fluids  Trend CMP  Monitor for electrolyte imbalances  Bowel management protocol

## 2023-03-23 NOTE — DIETARY
"Nutrition services: Day 0 of admit.  Mary Martinez is a 52 y.o. female with admitting DX of intractable abdominal pain.  Consult received for \"calorie count, failure to thrive, poor oral intake\".     RD communicated with MD, no need to calorie count at this time.  Patient with multiple previous admits and history of tube feeding. I met with patient at bedside this afternoon.  Patient reports that she has been unable to eat anything for the last week related to nausea and vomiting.  She has had multiple admits for similar issues.  Patient reports that prior to last week, her appetite was much improved, she was eating a variety of foods, and her weight has increased to 112 pounds based on her home scale.  Patient does not like boost or ensure supplements.  Discussed meal and snack preferences.       Pt appears thin with severe muscle and fat loss evidenced by hollowed orbitals, dark under eye circles, pronounced zygomatic arches, squared shoulders and hollowed temples.      Assessment:  Height: 165.1 cm (5' 5\")  Weight: 48.9 kg (107 lb 12.9 oz)  Body mass index is 17.94 kg/m²., BMI classification: Underweight.   Diet/Intake: Easy to chew + thin liquids    Evaluation:   History reviewed and includes: substance abuse, kidney disease, pancreatitis, GIB, GERD, chronic pain, dental disorder.  Labs and meds reviewed   Incidental finding of kidney stone per MD.   Based on chart review, weight has been relatively stable ~ 107 pounds for the last few months.  Despite this however, patient appears malnourished.       Malnutrition Risk: Patient presents with severe acute on chronic illness related malnutrition as evidenced by consumption of < 50% of nutrition needs within the last week and evidence of severe muscle and fat wasting.       Recommendations/Plan:   Encourage intake of meals and snacks.  Document intake of all PO  as % taken in ADL's to provide interdisciplinary communication across all shifts.   Monitor " weight.  Nutrition rep will continue to see patient for ongoing meal and snack preferences.       RD monitoring

## 2023-03-23 NOTE — ASSESSMENT & PLAN NOTE
CT abdomen pelvis has been ordered on 3/23/2023 for further characterization    Sullivan catheter placement    IV fluid hydration    Urology following

## 2023-03-24 ENCOUNTER — APPOINTMENT (OUTPATIENT)
Dept: RADIOLOGY | Facility: MEDICAL CENTER | Age: 53
End: 2023-03-24
Attending: PHYSICIAN ASSISTANT
Payer: MEDICAID

## 2023-03-24 VITALS
SYSTOLIC BLOOD PRESSURE: 107 MMHG | WEIGHT: 107.81 LBS | OXYGEN SATURATION: 94 % | RESPIRATION RATE: 18 BRPM | BODY MASS INDEX: 17.96 KG/M2 | HEART RATE: 73 BPM | DIASTOLIC BLOOD PRESSURE: 56 MMHG | HEIGHT: 65 IN | TEMPERATURE: 98.7 F

## 2023-03-24 PROBLEM — N13.9 OBSTRUCTIVE UROPATHY: Status: RESOLVED | Noted: 2023-03-23 | Resolved: 2023-03-24

## 2023-03-24 PROBLEM — I95.9 HYPOTENSION: Status: RESOLVED | Noted: 2022-08-31 | Resolved: 2023-03-24

## 2023-03-24 PROBLEM — R10.9 INTRACTABLE ABDOMINAL PAIN: Status: RESOLVED | Noted: 2023-03-22 | Resolved: 2023-03-24

## 2023-03-24 PROBLEM — R11.2 INTRACTABLE NAUSEA AND VOMITING: Status: RESOLVED | Noted: 2022-02-21 | Resolved: 2023-03-24

## 2023-03-24 LAB
ALBUMIN SERPL BCP-MCNC: 2.9 G/DL (ref 3.2–4.9)
ALBUMIN/GLOB SERPL: 0.9 G/DL
ALP SERPL-CCNC: 113 U/L (ref 30–99)
ALT SERPL-CCNC: 6 U/L (ref 2–50)
ANION GAP SERPL CALC-SCNC: 12 MMOL/L (ref 7–16)
AST SERPL-CCNC: 8 U/L (ref 12–45)
BILIRUB SERPL-MCNC: <0.2 MG/DL (ref 0.1–1.5)
BUN SERPL-MCNC: 8 MG/DL (ref 8–22)
CALCIUM ALBUM COR SERPL-MCNC: 9 MG/DL (ref 8.5–10.5)
CALCIUM SERPL-MCNC: 8.1 MG/DL (ref 8.5–10.5)
CHLORIDE SERPL-SCNC: 112 MMOL/L (ref 96–112)
CO2 SERPL-SCNC: 15 MMOL/L (ref 20–33)
CREAT SERPL-MCNC: 0.58 MG/DL (ref 0.5–1.4)
ERYTHROCYTE [DISTWIDTH] IN BLOOD BY AUTOMATED COUNT: 51.8 FL (ref 35.9–50)
GFR SERPLBLD CREATININE-BSD FMLA CKD-EPI: 110 ML/MIN/1.73 M 2
GLOBULIN SER CALC-MCNC: 3.2 G/DL (ref 1.9–3.5)
GLUCOSE SERPL-MCNC: 88 MG/DL (ref 65–99)
HCT VFR BLD AUTO: 27.3 % (ref 37–47)
HGB BLD-MCNC: 8.8 G/DL (ref 12–16)
MCH RBC QN AUTO: 30.3 PG (ref 27–33)
MCHC RBC AUTO-ENTMCNC: 32.2 G/DL (ref 33.6–35)
MCV RBC AUTO: 94.1 FL (ref 81.4–97.8)
PLATELET # BLD AUTO: 496 K/UL (ref 164–446)
PMV BLD AUTO: 9.7 FL (ref 9–12.9)
POTASSIUM SERPL-SCNC: 3.7 MMOL/L (ref 3.6–5.5)
PROT SERPL-MCNC: 6.1 G/DL (ref 6–8.2)
RBC # BLD AUTO: 2.9 M/UL (ref 4.2–5.4)
SODIUM SERPL-SCNC: 139 MMOL/L (ref 135–145)
WBC # BLD AUTO: 5.7 K/UL (ref 4.8–10.8)

## 2023-03-24 PROCEDURE — A9270 NON-COVERED ITEM OR SERVICE: HCPCS | Performed by: HOSPITALIST

## 2023-03-24 PROCEDURE — G0378 HOSPITAL OBSERVATION PER HR: HCPCS

## 2023-03-24 PROCEDURE — 700102 HCHG RX REV CODE 250 W/ 637 OVERRIDE(OP): Performed by: HOSPITALIST

## 2023-03-24 PROCEDURE — A9270 NON-COVERED ITEM OR SERVICE: HCPCS

## 2023-03-24 PROCEDURE — 99239 HOSP IP/OBS DSCHRG MGMT >30: CPT | Performed by: HOSPITALIST

## 2023-03-24 PROCEDURE — 85027 COMPLETE CBC AUTOMATED: CPT

## 2023-03-24 PROCEDURE — 700105 HCHG RX REV CODE 258

## 2023-03-24 PROCEDURE — 700111 HCHG RX REV CODE 636 W/ 250 OVERRIDE (IP)

## 2023-03-24 PROCEDURE — 74176 CT ABD & PELVIS W/O CONTRAST: CPT

## 2023-03-24 PROCEDURE — 96376 TX/PRO/DX INJ SAME DRUG ADON: CPT

## 2023-03-24 PROCEDURE — 80053 COMPREHEN METABOLIC PANEL: CPT

## 2023-03-24 PROCEDURE — 700102 HCHG RX REV CODE 250 W/ 637 OVERRIDE(OP)

## 2023-03-24 RX ORDER — OXYCODONE HYDROCHLORIDE 5 MG/1
5 TABLET ORAL ONCE
Status: COMPLETED | OUTPATIENT
Start: 2023-03-24 | End: 2023-03-24

## 2023-03-24 RX ADMIN — MIDODRINE HYDROCHLORIDE 10 MG: 5 TABLET ORAL at 08:07

## 2023-03-24 RX ADMIN — HYDROMORPHONE HYDROCHLORIDE 0.25 MG: 1 INJECTION, SOLUTION INTRAMUSCULAR; INTRAVENOUS; SUBCUTANEOUS at 08:06

## 2023-03-24 RX ADMIN — OMEPRAZOLE 20 MG: 20 CAPSULE, DELAYED RELEASE ORAL at 04:57

## 2023-03-24 RX ADMIN — OXYCODONE HYDROCHLORIDE 5 MG: 5 TABLET ORAL at 13:13

## 2023-03-24 RX ADMIN — NICOTINE TRANSDERMAL SYSTEM 21 MG: 21 PATCH, EXTENDED RELEASE TRANSDERMAL at 04:57

## 2023-03-24 RX ADMIN — CYANOCOBALAMIN TAB 500 MCG 1000 MCG: 500 TAB at 08:07

## 2023-03-24 RX ADMIN — SUCRALFATE 1 G: 1 TABLET ORAL at 08:06

## 2023-03-24 RX ADMIN — HYDROMORPHONE HYDROCHLORIDE 0.25 MG: 1 INJECTION, SOLUTION INTRAMUSCULAR; INTRAVENOUS; SUBCUTANEOUS at 04:58

## 2023-03-24 RX ADMIN — MIDODRINE HYDROCHLORIDE 10 MG: 5 TABLET ORAL at 11:14

## 2023-03-24 RX ADMIN — FOLIC ACID 1 MG: 1 TABLET ORAL at 04:57

## 2023-03-24 RX ADMIN — HYDROMORPHONE HYDROCHLORIDE 0.25 MG: 1 INJECTION, SOLUTION INTRAMUSCULAR; INTRAVENOUS; SUBCUTANEOUS at 01:29

## 2023-03-24 RX ADMIN — OXYCODONE HYDROCHLORIDE 5 MG: 5 TABLET ORAL at 11:49

## 2023-03-24 RX ADMIN — SODIUM CHLORIDE: 9 INJECTION, SOLUTION INTRAVENOUS at 01:29

## 2023-03-24 RX ADMIN — DULOXETINE HYDROCHLORIDE 30 MG: 30 CAPSULE, DELAYED RELEASE ORAL at 04:57

## 2023-03-24 RX ADMIN — SODIUM CHLORIDE: 9 INJECTION, SOLUTION INTRAVENOUS at 11:20

## 2023-03-24 RX ADMIN — ONDANSETRON 4 MG: 2 INJECTION INTRAMUSCULAR; INTRAVENOUS at 08:07

## 2023-03-24 RX ADMIN — SUCRALFATE 1 G: 1 TABLET ORAL at 11:14

## 2023-03-24 ASSESSMENT — PAIN DESCRIPTION - PAIN TYPE
TYPE: ACUTE PAIN

## 2023-03-24 ASSESSMENT — ENCOUNTER SYMPTOMS
FLANK PAIN: 1
FEVER: 0
ABDOMINAL PAIN: 1

## 2023-03-24 NOTE — PROGRESS NOTES
Pt educated on archibald care at home, given archibald leg bag - pt does not want leg bag on at this time, sent home with standard drainage bag in place and educated on how to apply leg bag. Pt educated on medication regimen, plan to follow up with urology in 2 weeks. Pt states her son is ED tech and will help her with archibald care at home. IV removed, VSS, pt ambulatory, dc home with family.

## 2023-03-24 NOTE — CARE PLAN
Problem: Pain - Standard  Goal: Alleviation of pain or a reduction in pain to the patient’s comfort goal  Outcome: Progressing     Problem: Knowledge Deficit - Standard  Goal: Patient and family/care givers will demonstrate understanding of plan of care, disease process/condition, diagnostic tests and medications  Outcome: Progressing     Problem: Skin Integrity  Goal: Skin integrity is maintained or improved  Outcome: Progressing     Problem: Depression  Goal: Patient and family/caregiver will verbalize accurate information about at least two of the possible causes of depression, three-four of the signs and symptoms of depression  Outcome: Progressing     Problem: Provide Safe Environment  Goal: Suicide environmental safety, protocols, policies, and practices will be implemented  Outcome: Progressing  Flowsheets (Taken 3/23/2023 2005)  Safety Interventions: Provided Safety Education     Problem: Psychosocial  Goal: Patient's ability to identify and develop effective coping behaviors will improve  Outcome: Progressing     Problem: Fall Risk  Goal: Patient will remain free from falls  Outcome: Progressing     Problem: Urinary Elimination  Goal: Establish and maintain regular urinary output  Outcome: Progressing   The patient is Stable - Low risk of patient condition declining or worsening    Shift Goals  Clinical Goals: CT abd/pelvis, pain mgt, archibald, wound care  Patient Goals: rest    Progress made toward(s) clinical / shift goals:  improving    Patient is not progressing towards the following goals:

## 2023-03-24 NOTE — PROGRESS NOTES
Note to reader: this note follows the APSO format rather than the historical SOAP format. Assessment and plan located at the top of the note for ease of use.    Chief Complaint  52 y.o. year old female here with abdominal pain, flank pain, left hydro, renal stones.     Assessment/Plan  Interval History   Chronic abdominal pain  Left hydronephrosis  Urinary retention  Renal stones      OK to DC from  standpoint  DC with archibald  We will arrange follow up in office in 2-3 weeks  Urology signing off      Case discussed with patient, Hospitalist and Urology-Dr. Edy MORA  Patient seen and examined    3/24. Archibald placed. Good UOP. No hydronephrosis on CT scan. Non obstructing renal stones visualized. Overall feeling better but still with intermittent sharp pains in left flank.            Review of Systems  Physical Exam   Review of Systems   Constitutional:  Negative for fever.   Gastrointestinal:  Positive for abdominal pain.   Genitourinary:  Positive for flank pain and urgency. Negative for hematuria.   Vitals:    03/23/23 1918 03/24/23 0352 03/24/23 0700 03/24/23 1115   BP: 92/53 106/59 107/63 107/56   Pulse: 67 89 73    Resp: 18 18 18    Temp: 36.6 °C (97.9 °F) 36.5 °C (97.7 °F) 37.1 °C (98.7 °F)    TempSrc: Temporal Temporal Temporal    SpO2: 93% 96% 94%    Weight:       Height:         Physical Exam  Vitals and nursing note reviewed.   Constitutional:       Appearance: Normal appearance.   HENT:      Head: Normocephalic and atraumatic.      Nose: Nose normal.      Mouth/Throat:      Mouth: Mucous membranes are moist.   Eyes:      Pupils: Pupils are equal, round, and reactive to light.   Pulmonary:      Effort: Pulmonary effort is normal.   Abdominal:      General: Abdomen is flat. There is no distension.      Palpations: Abdomen is soft.      Tenderness: There is no abdominal tenderness.   Genitourinary:     Comments: Archibald draining clear  Skin:     General: Skin is warm and dry.   Neurological:      General:  No focal deficit present.      Mental Status: She is alert and oriented to person, place, and time.   Psychiatric:         Mood and Affect: Mood normal.         Behavior: Behavior normal.        Hematology Chemistry   Lab Results   Component Value Date/Time    WBC 5.7 03/24/2023 12:14 AM    HEMOGLOBIN 8.8 (L) 03/24/2023 12:14 AM    HEMATOCRIT 27.3 (L) 03/24/2023 12:14 AM    PLATELETCT 496 (H) 03/24/2023 12:14 AM     Lab Results   Component Value Date/Time    SODIUM 139 03/24/2023 12:14 AM    POTASSIUM 3.7 03/24/2023 12:14 AM    CHLORIDE 112 03/24/2023 12:14 AM    CO2 15 (L) 03/24/2023 12:14 AM    GLUCOSE 88 03/24/2023 12:14 AM    BUN 8 03/24/2023 12:14 AM    CREATININE 0.58 03/24/2023 12:14 AM    CREATININE 0.7 11/29/2008 09:05 PM         Labs not explicitly included in this progress note were reviewed by the author.   Radiology/imaging not explicitly included in this progress note was reviewed by the author.     Radiology images reviewed, Labs reviewed and Medications reviewed

## 2023-03-24 NOTE — DISCHARGE SUMMARY
Discharge Summary    CHIEF COMPLAINT ON ADMISSION  Chief Complaint   Patient presents with    Abdominal Pain     Epigastric pain x1 week.        Reason for Admission  Abd Pain     Admission Date  3/22/2023    CODE STATUS  DNAR/DNI    HPI & HOSPITAL COURSE  As per nina pavon+p    Mary Martinez is a 52 y.o. female with an extensive past medical history of frequent hospitalizations for abdominal complaints who presented 3/22/2023 with abdominal pain and intractable nausea and vomiting.  Patient reports that she has a history of chronic abdominal pain for which she has been seen in the hospital on multiple occasions in the past several months.  On the Thursday prior to admission, her abdominal pain began increasing in severity, especially in the epigastric region.  She reports that the pain is dull in nature and radiates into her whole abdomen. The patient states that the pain is aggravated by eating and has no relieving factors. The patient states that over the past several months her oral intake has decreased significantly as multiple foods appear to precipitate these episodes. Over the past 4 days she has not been able to tolerate any PO intake. Denies diarrhea, hematemesis, melena, or chills.      Vital signs on arrival are as follows 98.2 degrees, 104, 18, 122/103, 98% on room air.     In the ED, she was noted to have an elevated platelet count of 637, and anion gap metabolic acidosis of 17 with a CO2 of 15.  Her lipase was negative at 33.  ERP gave IV fluid boluses and rechecked a CMP, which demonstrated improvements in her anion gap down to 15, with a CO2 of 15.  Her urinalysis was remarkable for mild ketosis and proteinuria, but without infectious etiology.  The patient's nausea and vomiting and pain persisted despite ERP's interventions, thus the hospitalist service was consulted for admission to observation status    =========================================    She had ultrasound done of the kidneys as  well as an abdominal x-ray.  There is definitely evidence bilateral nephrolithiasis.  On the left-hand side we thought that it may be some obstructive changes.  We consulted urology.  CT of the abdomen pelvis was ordered and there is no evidence of obstruction.  A Sullivan catheter was placed.  We recommended that Sullivan to continue and patient to follow with PCP in outpatient setting.    During her hospital stay she was given hydration and pain management    Therefore, she is discharged in good and stable condition to home with close outpatient follow-up.    The patient recovered much more quickly than anticipated on admission.    Discharge Date  3/24/2023    FOLLOW UP ITEMS POST DISCHARGE      DISCHARGE DIAGNOSES  Principal Problem (Resolved):    Intractable abdominal pain POA: Yes  Active Problems:    Tobacco dependence POA: Yes    Rheumatoid arthritis (HCC) POA: Yes      Overview: Diagnois update 10/1/2016    Renal calculus POA: Yes    Severe protein-calorie malnutrition (HCC) POA: Yes    Thrombocytosis POA: Yes  Resolved Problems:    Intractable nausea and vomiting POA: Yes    Hypotension POA: Yes    Obstructive uropathy POA: Yes      FOLLOW UP  Future Appointments   Date Time Provider Department Center   4/7/2023 11:00 AM [facility]. [facility] None     Jim Snow M.D.  [address]  [address]ld[address] [facility] [address]  [phone]    Follow up  we will contact to arrange follow up in 2-3 weeks      MEDICATIONS ON DISCHARGE     Medication List        CONTINUE taking these medications        Instructions   cyanocobalamin 1000 MCG Tabs  Commonly known as: VITAMIN B12   Take 1 Tablet by mouth every morning with breakfast.  Dose: 1,000 mcg     DULoxetine 30 MG Cpep  Commonly known as: CYMBALTA   Take 30 mg by mouth every day.  Dose: 30 mg     folic acid 1 MG Tabs  Commonly known as: FOLVITE   Take 1 Tablet by mouth 2 times a day.  Dose: 1 mg     midodrine 10 MG tablet  Commonly known as: PROAMATINE   Take 1  Tablet by mouth 3 times a day with meals.  Dose: 10 mg     multivitamin Tabs   Take 1 Tablet by mouth every day.  Dose: 1 Tablet     Naloxone 4 MG/0.1ML Liqd  Commonly known as: NARCAN   One spray in one nostril for overdose and call 911.     omeprazole 20 MG delayed-release capsule  Commonly known as: PRILOSEC   Take 1 Capsule by mouth every day.  Dose: 20 mg     oxyCODONE immediate-release 5 MG Tabs  Commonly known as: ROXICODONE   Take 1 Tablet by mouth 3 times a day as needed for Severe Pain for up to 30 days.  Dose: 5 mg     PARoxetine 30 MG Tabs  Commonly known as: PAXIL   Take 60 mg by mouth every evening. 2 tablet (60 mg)  Dose: 60 mg     QUEtiapine 100 MG Tabs  Commonly known as: Seroquel   Take 1 Tablet by mouth every evening.  Dose: 100 mg     sucralfate 1 GM Tabs  Commonly known as: CARAFATE   Take 1 Tablet by mouth 4 Times a Day,Before Meals and at Bedtime.  Dose: 1 g              Allergies  Allergies   Allergen Reactions    Penicillins Anaphylaxis and Hives     Tolerates cephalosporins; reports throat swelling with PCN    Abilify Unspecified     Multiple side effects    Aripiprazole Nausea     Spasms, shaking      Nitrous Oxide Vomiting    Citrus Rash and Itching     Only on Skin Contact       DIET  Orders Placed This Encounter   Procedures    Diet Order Diet: Level 7 - Easy to Chew; Liquid level: Level 0 - Thin     Standing Status:   Standing     Number of Occurrences:   1     Order Specific Question:   Diet:     Answer:   Level 7 - Easy to Chew [22]     Order Specific Question:   Liquid level     Answer:   Level 0 - Thin       ACTIVITY  As tolerated.  Weight bearing as tolerated    CONSULTATIONS  Dr hogue urology    PROCEDURES  Reading Physician Reading Date Result Priority   Petey Wray M.D.  [phone] 3/24/2023 Routine     Narrative & Impression    3/24/2023 1:08 AM     HISTORY/REASON FOR EXAM: .        TECHNIQUE/EXAM DESCRIPTION:  CT scan of the abdomen and pelvis without contrast.      Noncontrast helical scanning was obtained from the diaphragmatic domes through the pubic symphysis.     Low dose optimization technique was utilized for this CT exam including automated exposure control and adjustment of the mA and/or kV according to patient size.     COMPARISON: February 17, 2023     FINDINGS:     Lower Chest: Linear densities of the bilateral lung bases are seen favoring changes of atelectasis. 3 mm right middle lobe pulmonary nodule is seen on image 1.     Liver: Hepatomegaly is seen. There is mild intrahepatic biliary ductal dilatation identified.     Spleen: Unremarkable.     Pancreas: 4 mm dilatation the pancreatic duct is seen.     Gallbladder: The gallbladder has been resected.     Biliary: 11 mm dilatation the common bile duct is seen.     Adrenal glands: Normal.     Kidneys: Bilateral renal calculi are seen, otherwise unremarkable without hydronephrosis.     Bowel: Mild fluid-filled prominence of the small bowel is seen. The appendix appears within normal limits.     Lymph nodes: No adenopathy.     Vasculature: Unremarkable.     Peritoneum: Unremarkable without ascites.     Musculoskeletal: No acute or destructive process.     Pelvis: No adenopathy or free fluid. Uterus and adnexal structures appear within physiologic limits. Sullivan catheter is seen within the bladder appear     IMPRESSION:        1.  Mild intrahepatic and extra hepatic biliary and pancreatic ductal dilatation, commonly associated with postcholecystectomy physiology. Consider causes of biliary obstruction with additional workup as clinically appropriate.  2.  Mild fluid-filled prominence of small bowel suggesting ileus and/or enteritis.  3.  Bilateral nephrolithiasis without visualized obstructive changes  4.  Right middle lobe pulmonary nodule, see nodule follow-up recommendations below.     Fleischner Society pulmonary nodule recommendations:     Low Risk: No routine follow-up     High Risk: Optional CT at 12 months      Comments: Nodules less than 6 mm do not require routine follow-up, but certain patients at high risk with suspicious nodule morphology, upper lobe location, or both may warrant 12-month follow-up.     Low Risk - Minimal or absent history of smoking and of other known risk factors.     High Risk - History of smoking or of other known risk factors.     Note: These recommendations do not apply to lung cancer screening, patients with immunosuppression, or patients with known primary cancer.     Fleischner Society 2017 Guidelines for Management of Incidentally Detected Pulmonary Nodules in Adults              Exam Ended: 03/24/23  1:20 AM             LABORATORY  Lab Results   Component Value Date    SODIUM 139 03/24/2023    POTASSIUM 3.7 03/24/2023    CHLORIDE 112 03/24/2023    CO2 15 (L) 03/24/2023    GLUCOSE 88 03/24/2023    BUN 8 03/24/2023    CREATININE 0.58 03/24/2023    CREATININE 0.7 11/29/2008        Lab Results   Component Value Date    WBC 5.7 03/24/2023    HEMOGLOBIN 8.8 (L) 03/24/2023    HEMATOCRIT 27.3 (L) 03/24/2023    PLATELETCT 496 (H) 03/24/2023        Total time of the discharge process exceeds 39 minutes.

## 2023-03-24 NOTE — CARE PLAN
The patient is Stable - Low risk of patient condition declining or worsening    Shift Goals  Clinical Goals: pain control, tolerate diet, urology  Patient Goals: rest, pain, nausea    Progress made toward(s) clinical / shift goals:  Pt tolerating food with prn zofran, no emesis. Pain controlled with IV dilaudid - switched to PO oxycodone - unsure if able to tolerate with po meds at this time. Pt will dc with archibald, cleared by urology     Patient is not progressing towards the following goals:      Problem: Pain - Standard  Goal: Alleviation of pain or a reduction in pain to the patient’s comfort goal  Description: Target End Date:  Prior to discharge or change in level of care    Document on Vitals flowsheet    1.  Document pain using the appropriate pain scale per order or unit policy  2.  Educate and implement non-pharmacologic comfort measures (i.e. relaxation, distraction, massage, cold/heat therapy, etc.)  3.  Pain management medications as ordered  4.  Reassess pain after pain med administration per policy  5.  If opiods administered assess patient's response to pain medication is appropriate per POSS sedation scale  6.  Follow pain management plan developed in collaboration with patient and interdisciplinary team (including palliative care or pain specialists if applicable)  Outcome: Met     Problem: Knowledge Deficit - Standard  Goal: Patient and family/care givers will demonstrate understanding of plan of care, disease process/condition, diagnostic tests and medications  Description: Target End Date:  1-3 days or as soon as patient condition allows    Document in Patient Education    1.  Patient and family/caregiver oriented to unit, equipment, visitation policy and means for communicating concern  2.  Complete/review Learning Assessment  3.  Assess knowledge level of disease process/condition, treatment plan, diagnostic tests and medications  4.  Explain disease process/condition, treatment plan,  diagnostic tests and medications  Outcome: Met     Problem: Skin Integrity  Goal: Skin integrity is maintained or improved  Description: Target End Date:  Prior to discharge or change in level of care    Document interventions on Skin Risk/Zan flowsheet groups and corresponding LDA    1.  Assess and monitor skin integrity, appearance and/or temperature  2.  Assess risk factors for impaired skin integrity and/or pressures ulcers  3.  Implement precautions to protect skin integrity in collaboration with interdisciplinary team  4.  Implement pressure ulcer prevention protocol if at risk for skin breakdown  5.  Confirm wound care consult if at risk for skin breakdown  6.  Ensure patient use of pressure relieving devices  (Low air loss bed, waffle overlay, heel protectors, ROHO cushion, etc)  Outcome: Met     Problem: Depression  Goal: Patient and family/caregiver will verbalize accurate information about at least two of the possible causes of depression, three-four of the signs and symptoms of depression  Description: Target End Date:  1 to 3 days    1.  Assess the patient's and family/caregiver's knowledge regarding depression and its causes.  2.  Explain to the patient and family/caregiver regarding the major symptoms of depression.  3.  Inform the patient and family/caregiver that depression can be treated through medications and psychotherapy.  4.  Allow the patient to express feelings and perceptions  5.  Express hope to the patient with realistic comments about the patient's strengths and resources.  5.  Give positive feedback after a tasks are achieved.  6.  Encourage identification of positive aspects of self.  7.  Educate the patient about crisis intervention services such as suicide hotlines and other resources.  Outcome: Met     Problem: Provide Safe Environment  Goal: Suicide environmental safety, protocols, policies, and practices will be implemented  Description: Target End Date:  resolve day 1    1.   Remove objects or personal belongings that may cause harm or injury to self or others  2.  Dietary tray modifications (paperware)  3.  Provide a safe environment  4.  Render close patient supervision by sustaining observation or awareness of the patient at all times  Outcome: Met     Problem: Psychosocial  Goal: Patient's ability to identify and develop effective coping behaviors will improve  Description: Target End Date:  1 to 3 days    1.  Present opportunities for the patient to express thoughts, and feelings in a nonjudgmental environment  2.  Help the patient with problem-solving in a constructive manner.  3.  Educate the patient on cognitive-behavioral self-management responses to suicidal thoughts.  4.  Introduce the use of self-expression methods to manage suicidal feelings  5.  Provide emotional support  6.  Encourage identification of positive aspects of self  Outcome: Met  Goal: Patient's ability to identify and utilize available support systems will improve  Description: Target End Date:  1 to 3 days    1.  Help patient identify available resources and support systems  2.  Collaborate with interdisciplinary team  3.  Collaborate with patient, family/caregiver and other support systems  Outcome: Met     Problem: Fall Risk  Goal: Patient will remain free from falls  Description: Target End Date:  Prior to discharge or change in level of care    Document interventions on the [facility] Fall Risk Assessment    1.  Assess for fall risk factors  2.  Implement fall precautions  Outcome: Met     Problem: Urinary Elimination  Goal: Establish and maintain regular urinary output  Description: Target End Date:  Prior to discharge or change in level of care    Document on I/O and Assessment flowsheets    1.  Evaluate need to continue indwelling catheter every shift  2.  Assess signs and symptoms of urinary retention  3.  Assess post-void residual volumes  4.  Implement bladder training program  5.  Encourage  scheduled voidings  6.  Assist patient to sit on bedside commode or toilet for voiding  7.  Educate patient and family/caregiver on use and purpose of urine collection devices (document in Patient Education)  Outcome: Met

## 2023-03-24 NOTE — PROGRESS NOTES
Pt pleasant and cooperative. Reporting 8/10 stabbing left flank pain and nausea early this morning. No emesis this AM. Pt given IV dilaudid and zofran. Pt now reports nausea relief and pain relief 5/10 with dilaudid. Educated pt on using PO pain meds since IV will not be available at discharge - pt is agreeable and verbalizes understanding. Pt also takes PO zofran at home and reports nausea relief with this.     After IV zofran pt was able to tolerate 25% of breakfast. Sullivan still in place. Plan to use PO meds to assess for tolerance at home. No concerns at this time

## 2023-03-26 ENCOUNTER — HOSPITAL ENCOUNTER (EMERGENCY)
Facility: MEDICAL CENTER | Age: 53
End: 2023-03-26
Attending: EMERGENCY MEDICINE
Payer: MEDICAID

## 2023-03-26 VITALS
BODY MASS INDEX: 18.32 KG/M2 | HEART RATE: 70 BPM | DIASTOLIC BLOOD PRESSURE: 70 MMHG | WEIGHT: 103.4 LBS | RESPIRATION RATE: 16 BRPM | SYSTOLIC BLOOD PRESSURE: 109 MMHG | TEMPERATURE: 98.6 F | HEIGHT: 63 IN | OXYGEN SATURATION: 98 %

## 2023-03-26 DIAGNOSIS — T83.9XXA FOLEY CATHETER PROBLEM, INITIAL ENCOUNTER (HCC): ICD-10-CM

## 2023-03-26 DIAGNOSIS — N20.0 NEPHROLITHIASIS: ICD-10-CM

## 2023-03-26 DIAGNOSIS — G89.4 CHRONIC PAIN SYNDROME: ICD-10-CM

## 2023-03-26 PROCEDURE — 700111 HCHG RX REV CODE 636 W/ 250 OVERRIDE (IP): Performed by: EMERGENCY MEDICINE

## 2023-03-26 PROCEDURE — 51702 INSERT TEMP BLADDER CATH: CPT

## 2023-03-26 PROCEDURE — 99284 EMERGENCY DEPT VISIT MOD MDM: CPT

## 2023-03-26 PROCEDURE — 96375 TX/PRO/DX INJ NEW DRUG ADDON: CPT | Mod: XU

## 2023-03-26 PROCEDURE — 96374 THER/PROPH/DIAG INJ IV PUSH: CPT

## 2023-03-26 PROCEDURE — 303105 HCHG CATHETER EXTRA

## 2023-03-26 RX ORDER — HYDROMORPHONE HYDROCHLORIDE 1 MG/ML
1 INJECTION, SOLUTION INTRAMUSCULAR; INTRAVENOUS; SUBCUTANEOUS ONCE
Status: COMPLETED | OUTPATIENT
Start: 2023-03-26 | End: 2023-03-26

## 2023-03-26 RX ORDER — OXYCODONE AND ACETAMINOPHEN 10; 325 MG/1; MG/1
1 TABLET ORAL EVERY 4 HOURS PRN
Qty: 10 TABLET | Refills: 0 | Status: SHIPPED | OUTPATIENT
Start: 2023-03-26 | End: 2023-03-31

## 2023-03-26 RX ORDER — OXYCODONE HYDROCHLORIDE 5 MG/1
5 TABLET ORAL 3 TIMES DAILY
Status: SHIPPED | COMMUNITY
End: 2023-05-25

## 2023-03-26 RX ORDER — KETOROLAC TROMETHAMINE 30 MG/ML
30 INJECTION, SOLUTION INTRAMUSCULAR; INTRAVENOUS ONCE
Status: COMPLETED | OUTPATIENT
Start: 2023-03-26 | End: 2023-03-26

## 2023-03-26 RX ORDER — HYDROMORPHONE HYDROCHLORIDE 1 MG/ML
1 INJECTION, SOLUTION INTRAMUSCULAR; INTRAVENOUS; SUBCUTANEOUS ONCE
Status: DISCONTINUED | OUTPATIENT
Start: 2023-03-26 | End: 2023-03-26

## 2023-03-26 RX ORDER — ONDANSETRON 4 MG/1
4 TABLET, ORALLY DISINTEGRATING ORAL ONCE
Status: COMPLETED | OUTPATIENT
Start: 2023-03-26 | End: 2023-03-26

## 2023-03-26 RX ADMIN — HYDROMORPHONE HYDROCHLORIDE 1 MG: 1 INJECTION, SOLUTION INTRAMUSCULAR; INTRAVENOUS; SUBCUTANEOUS at 13:25

## 2023-03-26 RX ADMIN — KETOROLAC TROMETHAMINE 30 MG: 30 INJECTION, SOLUTION INTRAMUSCULAR; INTRAVENOUS at 13:17

## 2023-03-26 RX ADMIN — ONDANSETRON 4 MG: 4 TABLET, ORALLY DISINTEGRATING ORAL at 13:17

## 2023-03-26 ASSESSMENT — PAIN DESCRIPTION - DESCRIPTORS: DESCRIPTORS: STABBING;SHARP

## 2023-03-26 ASSESSMENT — FIBROSIS 4 INDEX: FIB4 SCORE: 0.33

## 2023-03-26 NOTE — ED PROVIDER NOTES
"  ER Provider Note    Scribed for Jim Cornell M.D. by Jj Ferguson. 3/26/2023  12:34 PM    Primary Care Provider: Fidencio Christopher D.O.    CHIEF COMPLAINT  Chief Complaint   Patient presents with    Flank Pain     L flank - diagnosed with 6mm kidney stone    Urinary Retention     Has catheter but has noticed feeling more bladder pressure and fullness and less drainage has been draining into urinary bag     LIMITATION TO HISTORY   Select: : None    HPI/ROS    EXTERNAL RECORDS REVIEWED  Inpatient Notes Patient was in the hospital 2 days ago for abdominal pain. Diagnosed with a 6 mm nonobstructive kidney stone. Ultrasound showed hydronephrosis, which was treated with a archibald catheter with plans for a follow up with urology.     Mary Martinez is a 52 y.o. female who presents to the ED for urinary retention onset this morning. She states that after 6 AM this morning she has not had any urine output. Her son was concerned with the small amount of drainage in her catheter, which prompted the patient to come into the ED. The patient states she has a 6 mm kidney stone that has been \"killing\" her, which she was diagnosed for this 2 days ago. She reports she was experiencing flank pain when she got diagnosed, she is still complaining of this currently. She states she has had abdominal issues for the past 2 years and that her bladder does not drain properly. She has been referred to urology for these complaints.    PAST MEDICAL HISTORY  Past Medical History:   Diagnosis Date    Acute renal failure (ARF) (HCC)     Allergy     Anemia     Anxiety     Arthritis     Rheumatoid -follows with rheumatologist. Has several fractures due to RA.    ASTHMA     inhalers prn    Blood transfusion without reported diagnosis     Bowel habit changes     4/24/20-constipation and diarrhea.    Bronchitis last approx 2018    Chronic pain 04/24/2020    Due to RA    Dental disorder     no teeth upper-does not wear her denture. Broken teeth " on bottom.    Depression     GERD (gastroesophageal reflux disease)     GIB (gastrointestinal bleeding)     Head ache     Heart burn     taking famotidine    Hiatus hernia syndrome     History of pancreatitis     Hypokalemia     Hyponatremia     Idiopathic chronic pancreatitis (HCC)     Indigestion     taking famotidine    Intractable nausea and vomiting     Kidney disease     Pain     CPS-everywhere    Pneumonia 10/2019    PONV (postoperative nausea and vomiting)     Psychiatric problem     anxiety and depression    Rheumatoid aortitis     Rheumatoid arthritis(714.0) 2003    SMAS (superior mesenteric artery syndrome) (HCC)     Substance abuse (HCC)        SURGICAL HISTORY  Past Surgical History:   Procedure Laterality Date    WA UPPER GI ENDOSCOPY,DIAGNOSIS N/A 9/9/2022    Procedure: ENDOSCOPIC ULTRASOUND- UPPER;  Surgeon: Jorge Brooke M.D.;  Location: [facility];  Service: EUS    EGD W/ENDOSCOPIC ULTRASOUND N/A 9/9/2022    Procedure: EGD, WITH ENDOSCOPIC US;  Surgeon: Jorge Brooke M.D.;  Location: [facility];  Service: EUS    WA ENDOSCOPIC US EXAM, ESOPH  4/29/2020    Procedure: EGD, WITH ENDOSCOPIC US;  Surgeon: Jorge Brooke M.D.;  Location: [facility];  Service: Gastroenterology    GASTROSCOPY-ENDO  4/29/2020    Procedure: GASTROSCOPY;  Surgeon: Jorge Brooke M.D.;  Location: [facility];  Service: Gastroenterology    EGD WITH ASP/BX  4/29/2020    Procedure: EGD, WITH ASPIRATION BIOPSY - POSS FNA;  Surgeon: Jorge Brooke M.D.;  Location: [facility];  Service: Gastroenterology    WA EXPLORATORY OF ABDOMEN N/A 10/4/2019    Procedure: LAPAROTOMY, EXPLORATORY AND REPAIR OF DUODENAL PERFORATION;  Surgeon: James Dumont M.D.;  Location: [facility];  Service: General    COLONOSCOPY  2018    with upper endoscopy    FINGER ARTHROPLASTY Right 6/5/2017    Procedure: FINGER ARTHROPLASTY - LONG, RING  AND SMALL VOLAR PLATE;  Surgeon: Lobo Rosen M.D.;  Location: SURGERY SAME DAY [facility];  Service:     FINGER AMPUTATION  6/5/2017    Procedure: FINGER AMPUTATION - LONG, RING AND SMALL AT THE PROXIMAL INTERPHALANGEAL JOINT;  Surgeon: Lobo Rosen M.D.;  Location: SURGERY SAME DAY [facility];  Service:     FINGER ARTHROPLASTY Right 4/17/2017    Procedure: FINGER ARTHROPLASTY ;  Surgeon: Lobo Rosen M.D.;  Location: SURGERY SAME DAY [facility];  Service:     FINGER AMPUTATION Right 9/14/2016    Procedure: FINGER AMPUTATION INDEX;  Surgeon: Lobo Rosen M.D.;  Location: SURGERY SAME DAY [facility];  Service:     IRRIGATION & DEBRIDEMENT ORTHO Right 9/11/2016    Procedure: IRRIGATION & DEBRIDEMENT ORTHO - right index finger;  Surgeon: Madhu Rosen M.D.;  Location: [facility];  Service:     FUSION, PIP JOINT, TOE Right 8/29/2016    Procedure: RE-DO INDEX FINGER PROXIMAL INTERPHALANGEAL ARTHRODESIS;  Surgeon: Lobo Rosen M.D.;  Location: SURGERY SAME DAY [facility];  Service:     BONE GRAFT Right 8/29/2016    Procedure: BONE GRAFT - DISTAL RADIUS ;  Surgeon: Lobo Rosen M.D.;  Location: SURGERY SAME DAY [facility];  Service:     ARTHRODESIS Right 5/6/2016    Procedure: ARTHRODESIS INDEX FINGER PROXIMAL INTERPHALANGEAL;  Surgeon: Lobo Rosen M.D.;  Location: SURGERY SAME DAY [facility];  Service:     FOOT RECONSTRUCTION RHEUMATIC Left 7/29/2015    Procedure: FOOT RECONSTRUCTION RHEUMATIC;  Surgeon: Heriberto Alves M.D.;  Location: SURGERY [facility];  Service:     TOE FUSION Right 5/27/2015    Procedure: TOE FUSION 1ST METATARSALPHALANGEAL JOINT;  Surgeon: Heriberto Alves M.D.;  Location: SURGERY [facility];  Service:     BONE SPUR EXCISION  5/27/2015    Procedure: BONE SPUR EXCISION METATARSAL HEAD 2-3;  Surgeon: Heriberto Alves M.D.;  Location: SURGERY [facility];  Service:     HAMMERTOE CORRECTION   5/27/2015    Procedure: HAMMERTOE CORRECTION AND SOFT TISSUE RE-ALINGMENT 2-3 ;  Surgeon: Heriberto Alves M.D.;  Location: SURGERY [facility];  Service:     DENTAL EXTRACTION(S)  2014    all of upper teeth    ABDOMINAL ABSCESS DRAINAGE  9/27/2011    Performed by VERO WRIGHT at SURGERY [facility]    EMANUEL BY LAPAROSCOPY  9/27/2011    Performed by VERO WRIGHT at SURGERY [facility]    APPENDECTOMY  2011    Found out it had ruptured prior to abcess surgery    REPEAT C SECTION  2008    REPEAT C SECTION  2005    REPEAT C SECTION  1999    PRIMARY C SECTION  1991    TONSILLECTOMY  1982    WRIST EXPLORATION Left 1980's    fractured wrist-no hardware       FAMILY HISTORY  Family History   Problem Relation Age of Onset    Cancer Mother     Heart Disease Mother     Hypertension Mother     Heart Disease Father     Hypertension Father        SOCIAL HISTORY   reports that she has been smoking cigarettes. She has a 30.00 pack-year smoking history. She has never used smokeless tobacco. She reports that she does not drink alcohol and does not use drugs.    CURRENT MEDICATIONS  Previous Medications    CYANOCOBALAMIN (VITAMIN B12) 1000 MCG TAB    Take 1 Tablet by mouth every morning with breakfast.    DULOXETINE (CYMBALTA) 30 MG CAP DR PARTICLES    Take 30 mg by mouth every day.    FOLIC ACID (FOLVITE) 1 MG TAB    Take 1 Tablet by mouth 2 times a day.    MIDODRINE (PROAMATINE) 10 MG TABLET    Take 1 Tablet by mouth 3 times a day with meals.    MULTIVITAMIN TAB    Take 1 Tablet by mouth every day.    NALOXONE (NARCAN) 4 MG/0.1ML LIQUID    One spray in one nostril for overdose and call 911.    OMEPRAZOLE (PRILOSEC) 20 MG DELAYED-RELEASE CAPSULE    Take 1 Capsule by mouth every day.    OXYCODONE IMMEDIATE-RELEASE (ROXICODONE) 5 MG TAB    Take 1 Tablet by mouth 3 times a day as needed for Severe Pain for up to 30 days.    PAROXETINE (PAXIL) 30 MG TAB    Take 60 mg by mouth every evening. 2 tablet (60 mg)     "QUETIAPINE (SEROQUEL) 100 MG TAB    Take 1 Tablet by mouth every evening.    SUCRALFATE (CARAFATE) 1 GM TAB    Take 1 Tablet by mouth 4 Times a Day,Before Meals and at Bedtime.       ALLERGIES  Penicillins, Abilify, Aripiprazole, Nitrous oxide, and Citrus    PHYSICAL EXAM  /75   Pulse 75   Temp 37.1 °C (98.8 °F) (Temporal)   Resp 18   Ht 1.6 m (5' 3\")   Wt 46.9 kg (103 lb 6.3 oz)   LMP 09/21/2011   SpO2 98%   BMI 18.32 kg/m²     Nursing note and vitals reviewed.  Constitutional: Well-developed and well-nourished. No distress.   HENT: Head is normocephalic and atraumatic. Oropharynx is clear and moist without exudate or erythema.   Eyes: Pupils are equal, round, and reactive to light. Conjunctiva are normal.   Cardiovascular: Normal rate and regular rhythm. No murmur heard. Normal radial pulses.  Pulmonary/Chest: Breath sounds normal. No wheezes or rales.   Abdominal: Soft and non-tender. No distention    Musculoskeletal: Extremities exhibit normal range of motion without edema or tenderness.   Neurological: Awake, alert and oriented to person, place, and time. No focal deficits noted.  : Sullivan catheter in place, clear, yellow urine in bag.  Skin: Skin is warm and dry. No rash.   Extremities: Right hand finger amputation noted.   Psychiatric: Normal mood and affect. Appropriate for clinical situation.    DIAGNOSTIC STUDIES & PROCEDURES    Labs:   Results for orders placed or performed during the hospital encounter of 03/22/23   CBC WITH DIFFERENTIAL   Result Value Ref Range    WBC 7.9 4.8 - 10.8 K/uL    RBC 4.07 (L) 4.20 - 5.40 M/uL    Hemoglobin 12.3 12.0 - 16.0 g/dL    Hematocrit 37.2 37.0 - 47.0 %    MCV 91.4 81.4 - 97.8 fL    MCH 30.2 27.0 - 33.0 pg    MCHC 33.1 (L) 33.6 - 35.0 g/dL    RDW 50.3 (H) 35.9 - 50.0 fL    Platelet Count 637 (H) 164 - 446 K/uL    MPV 9.7 9.0 - 12.9 fL    Neutrophils-Polys 68.20 44.00 - 72.00 %    Lymphocytes 22.10 22.00 - 41.00 %    Monocytes 5.80 0.00 - 13.40 %    " Eosinophils 2.50 0.00 - 6.90 %    Basophils 0.90 0.00 - 1.80 %    Immature Granulocytes 0.50 0.00 - 0.90 %    Nucleated RBC 0.00 /100 WBC    Neutrophils (Absolute) 5.39 2.00 - 7.15 K/uL    Lymphs (Absolute) 1.75 1.00 - 4.80 K/uL    Monos (Absolute) 0.46 0.00 - 0.85 K/uL    Eos (Absolute) 0.20 0.00 - 0.51 K/uL    Baso (Absolute) 0.07 0.00 - 0.12 K/uL    Immature Granulocytes (abs) 0.04 0.00 - 0.11 K/uL    NRBC (Absolute) 0.00 K/uL   COMP METABOLIC PANEL   Result Value Ref Range    Sodium 137 135 - 145 mmol/L    Potassium 4.0 3.6 - 5.5 mmol/L    Chloride 105 96 - 112 mmol/L    Co2 15 (L) 20 - 33 mmol/L    Anion Gap 17.0 (H) 7.0 - 16.0    Glucose 105 (H) 65 - 99 mg/dL    Bun 10 8 - 22 mg/dL    Creatinine 0.77 0.50 - 1.40 mg/dL    Calcium 9.7 8.5 - 10.5 mg/dL    AST(SGOT) 10 (L) 12 - 45 U/L    ALT(SGPT) 8 2 - 50 U/L    Alkaline Phosphatase 150 (H) 30 - 99 U/L    Total Bilirubin 0.2 0.1 - 1.5 mg/dL    Albumin 3.8 3.2 - 4.9 g/dL    Total Protein 8.5 (H) 6.0 - 8.2 g/dL    Globulin 4.7 (H) 1.9 - 3.5 g/dL    A-G Ratio 0.8 g/dL   LIPASE   Result Value Ref Range    Lipase 33 11 - 82 U/L   HCG QUAL SERUM   Result Value Ref Range    Beta-Hcg Qualitative Serum Negative Negative   URINALYSIS,CULTURE IF INDICATED    Specimen: Urine   Result Value Ref Range    Color Yellow     Character Clear     Specific Gravity 1.018 <1.035    Ph 7.0 5.0 - 8.0    Glucose Negative Negative mg/dL    Ketones 15 (A) Negative mg/dL    Protein 30 (A) Negative mg/dL    Bilirubin Negative Negative    Urobilinogen, Urine 0.2 Negative    Nitrite Negative Negative    Leukocyte Esterase Trace (A) Negative    Occult Blood Negative Negative    Micro Urine Req Microscopic    CORRECTED CALCIUM   Result Value Ref Range    Correct Calcium 9.9 8.5 - 10.5 mg/dL   ESTIMATED GFR   Result Value Ref Range    GFR (CKD-EPI) 93 >60 mL/min/1.73 m 2   MAGNESIUM   Result Value Ref Range    Magnesium 1.9 1.5 - 2.5 mg/dL   PHOSPHORUS   Result Value Ref Range    Phosphorus 2.6  2.5 - 4.5 mg/dL   TROPONIN   Result Value Ref Range    Troponin T 15 6 - 19 ng/L   URINE MICROSCOPIC (W/UA)   Result Value Ref Range    WBC 0-2 /hpf    RBC 0-2 /hpf    Bacteria Negative None /hpf    Epithelial Cells Few /hpf    Hyaline Cast 3-5 (A) /lpf   CMP   Result Value Ref Range    Sodium 136 135 - 145 mmol/L    Potassium 3.7 3.6 - 5.5 mmol/L    Chloride 106 96 - 112 mmol/L    Co2 15 (L) 20 - 33 mmol/L    Anion Gap 15.0 7.0 - 16.0    Glucose 92 65 - 99 mg/dL    Bun 10 8 - 22 mg/dL    Creatinine 0.66 0.50 - 1.40 mg/dL    Calcium 8.7 8.5 - 10.5 mg/dL    AST(SGOT) 8 (L) 12 - 45 U/L    ALT(SGPT) 6 2 - 50 U/L    Alkaline Phosphatase 128 (H) 30 - 99 U/L    Total Bilirubin 0.2 0.1 - 1.5 mg/dL    Albumin 3.2 3.2 - 4.9 g/dL    Total Protein 7.4 6.0 - 8.2 g/dL    Globulin 4.2 (H) 1.9 - 3.5 g/dL    A-G Ratio 0.8 g/dL   CORRECTED CALCIUM   Result Value Ref Range    Correct Calcium 9.3 8.5 - 10.5 mg/dL   ESTIMATED GFR   Result Value Ref Range    GFR (CKD-EPI) 106 >60 mL/min/1.73 m 2   MAGNESIUM   Result Value Ref Range    Magnesium 1.8 1.5 - 2.5 mg/dL   Magnesium   Result Value Ref Range    Magnesium 1.8 1.5 - 2.5 mg/dL   Comp Metabolic Panel (CMP)   Result Value Ref Range    Sodium 137 135 - 145 mmol/L    Potassium 3.8 3.6 - 5.5 mmol/L    Chloride 112 96 - 112 mmol/L    Co2 15 (L) 20 - 33 mmol/L    Anion Gap 10.0 7.0 - 16.0    Glucose 85 65 - 99 mg/dL    Bun 9 8 - 22 mg/dL    Creatinine 0.60 0.50 - 1.40 mg/dL    Calcium 8.5 8.5 - 10.5 mg/dL    AST(SGOT) 7 (L) 12 - 45 U/L    ALT(SGPT) 6 2 - 50 U/L    Alkaline Phosphatase 114 (H) 30 - 99 U/L    Total Bilirubin <0.2 0.1 - 1.5 mg/dL    Albumin 2.9 (L) 3.2 - 4.9 g/dL    Total Protein 6.4 6.0 - 8.2 g/dL    Globulin 3.5 1.9 - 3.5 g/dL    A-G Ratio 0.8 g/dL   CBC with Differential   Result Value Ref Range    WBC 7.1 4.8 - 10.8 K/uL    RBC 3.03 (L) 4.20 - 5.40 M/uL    Hemoglobin 9.1 (L) 12.0 - 16.0 g/dL    Hematocrit 29.1 (L) 37.0 - 47.0 %    MCV 96.0 81.4 - 97.8 fL    MCH 30.0  27.0 - 33.0 pg    MCHC 31.3 (L) 33.6 - 35.0 g/dL    RDW 53.1 (H) 35.9 - 50.0 fL    Platelet Count 498 (H) 164 - 446 K/uL    MPV 9.7 9.0 - 12.9 fL    Neutrophils-Polys 55.30 44.00 - 72.00 %    Lymphocytes 31.70 22.00 - 41.00 %    Monocytes 8.00 0.00 - 13.40 %    Eosinophils 3.80 0.00 - 6.90 %    Basophils 0.80 0.00 - 1.80 %    Immature Granulocytes 0.40 0.00 - 0.90 %    Nucleated RBC 0.00 /100 WBC    Neutrophils (Absolute) 3.91 2.00 - 7.15 K/uL    Lymphs (Absolute) 2.25 1.00 - 4.80 K/uL    Monos (Absolute) 0.57 0.00 - 0.85 K/uL    Eos (Absolute) 0.27 0.00 - 0.51 K/uL    Baso (Absolute) 0.06 0.00 - 0.12 K/uL    Immature Granulocytes (abs) 0.03 0.00 - 0.11 K/uL    NRBC (Absolute) 0.00 K/uL   CORRECTED CALCIUM   Result Value Ref Range    Correct Calcium 9.4 8.5 - 10.5 mg/dL   ESTIMATED GFR   Result Value Ref Range    GFR (CKD-EPI) 109 >60 mL/min/1.73 m 2   CBC WITHOUT DIFFERENTIAL   Result Value Ref Range    WBC 5.7 4.8 - 10.8 K/uL    RBC 2.90 (L) 4.20 - 5.40 M/uL    Hemoglobin 8.8 (L) 12.0 - 16.0 g/dL    Hematocrit 27.3 (L) 37.0 - 47.0 %    MCV 94.1 81.4 - 97.8 fL    MCH 30.3 27.0 - 33.0 pg    MCHC 32.2 (L) 33.6 - 35.0 g/dL    RDW 51.8 (H) 35.9 - 50.0 fL    Platelet Count 496 (H) 164 - 446 K/uL    MPV 9.7 9.0 - 12.9 fL   Comp Metabolic Panel   Result Value Ref Range    Sodium 139 135 - 145 mmol/L    Potassium 3.7 3.6 - 5.5 mmol/L    Chloride 112 96 - 112 mmol/L    Co2 15 (L) 20 - 33 mmol/L    Anion Gap 12.0 7.0 - 16.0    Glucose 88 65 - 99 mg/dL    Bun 8 8 - 22 mg/dL    Creatinine 0.58 0.50 - 1.40 mg/dL    Calcium 8.1 (L) 8.5 - 10.5 mg/dL    AST(SGOT) 8 (L) 12 - 45 U/L    ALT(SGPT) 6 2 - 50 U/L    Alkaline Phosphatase 113 (H) 30 - 99 U/L    Total Bilirubin <0.2 0.1 - 1.5 mg/dL    Albumin 2.9 (L) 3.2 - 4.9 g/dL    Total Protein 6.1 6.0 - 8.2 g/dL    Globulin 3.2 1.9 - 3.5 g/dL    A-G Ratio 0.9 g/dL   CORRECTED CALCIUM   Result Value Ref Range    Correct Calcium 9.0 8.5 - 10.5 mg/dL   ESTIMATED GFR   Result Value Ref  Range    GFR (CKD-EPI) 110 >60 mL/min/1.73 m 2   EKG (Now)   Result Value Ref Range    Report       [facility] Emergency Dept.    Test Date:  2023  Pt Name:    STEFFANIE ELLISON                Department: ER  MRN:        [id number]                      Room:       T203  Gender:     Female                       Technician: [id number]  :        1970                   Requested By:DIMA WALL  Order #:    [id number]                    Reading MD: DIMA WALL, DO    Measurements  Intervals                                Axis  Rate:       61                           P:          78  MT:         182                          QRS:        89  QRSD:       107                          T:          82  QT:         415  QTc:        418    Interpretive Statements  Sinus rhythm  Low voltage, precordial leads  Nonspecific T abnormalities, lateral leads  Baseline wander in lead(s) I,III,aVR,aVL,aVF,V2,V5  Compared to ECG 2023 17:52:23  T-wave abnormality now present  Electronically Signed On 3- 20:40:45 PDT by DIMA WALL, DO        All labs reviewed by me.    COURSE & MEDICAL DECISION MAKING    ED Observation Status? Yes; I am placing the patient in to an observation status due to a diagnostic uncertainty as well as therapeutic intensity. Patient placed in observation status at 12:50 PM, 3/26/2023.     Observation plan is as follows: diagnostic studies and symptom management     Upon Reevaluation, the patient's condition has: Improved; and will be discharged.    Patient discharged from ED Observation status at 1:33 PM  (Time) 3/26/2023  (Date).     INITIAL ASSESSMENT AND PLAN  Care Narrative: Patient presented today with flank pain and abdominal pain.  Also noted to have decreased output from her catheter.  Attempted to flush the catheter.  This was unsuccessful at relieving the obstruction.  Therefore the catheter was replaced.  Patient had good improvement of her pain  with Toradol and hydromorphone.  Given a prescription for Percocet for home use.  Instructed to follow-up with her pain management physician to discuss ongoing treatment of pain and breakthrough medication.      12:34 PM - Patient seen and evaluated at bedside. Mary Martinez is a 52 y.o. female with a history of abdominal pain, who presents with urinary retention. Patient will be treated with Dilaudid 1 mg, Toradol 30 mg and Zofran 4 mg for her symptoms. Ordered UA to evaluate. She understands and agrees to the plan of care. The patient was treated with Zofran for nausea.  Placed on a cardiac monitor to monitor for any arrhythmia associated with Zofran and QT prolongation. The patient was treated with IV narcotics for pain control.  Placed on cardiac and blood pressure monitor to monitor for associated hypotension.  Also placed on pulse oximetry to monitor for hypoxia associated with medication administration/side effect.               DISPOSITION AND DISCUSSIONS  Escalation of care considered, and ultimately not performed: blood analysis, diagnostic imaging, and acute inpatient care management, however at this time, the patient is most appropriate for outpatient management.    Barriers to care at this time, including but not limited to: None.     Decision tools and prescription drugs considered including, but not limited to: Pain Medications Dilaudid 1 mg, Toradol 30 mg .    Patient has no evidence of systemic illness.  No fever.  Not tachycardic.  Normal vital signs.  Catheter replaced.  We will follow-up with urology as scheduled.    FINAL IMPRESSION   1. Sullivan catheter problem, initial encounter ([facility])    2. Nephrolithiasis    3. Chronic pain syndrome         Jj MOLINA), am scribing for, and in the presence of, Jim Cornell M.D..    Electronically signed by: Jj Rivera), 3/26/2023    Jim MOLINA M.D. personally performed the services described in this documentation,  as scribed by Jj Ferguson in my presence, and it is both accurate and complete.    The note accurately reflects work and decisions made by me.  Jim Cornell M.D.  3/26/2023  1:34 PM

## 2023-03-26 NOTE — ED NOTES
.Patient d/c per MD order.  Pt states understanding of d.c instructions.  Pt given copies of instructions.  Belongings with pt.  VSS.  Pt ambulate to lobby with steady gait.      Pt had urine output  Pt drank water with ease   Pt son picked her up

## 2023-03-26 NOTE — ED TRIAGE NOTES
Patient presents with complaints of worsening L flank pain and urinary catheter issues. States that today and yesterday there has been less urine noted to be draining and patient has been feeling bladder fullness and pressure. Was at [facility] last week and discharged on Friday with a catheter and was diagnosed with a kidney stone. Has a pain management provider and takes oxycodone TID normally and is not helping with her pain.

## 2023-03-28 ENCOUNTER — PATIENT OUTREACH (OUTPATIENT)
Dept: HEALTH INFORMATION MANAGEMENT | Facility: OTHER | Age: 53
End: 2023-03-28
Payer: MEDICAID

## 2023-03-28 NOTE — PROGRESS NOTES
CHW Jennifer contacted pt to follow up post discharge. Pt reports no needs at this time. CHW verified pt has CCM contact information and encouraged her to call if anything was needed in the future.  CHW will not continue to follow at this time.

## 2023-03-31 ENCOUNTER — OFFICE VISIT (OUTPATIENT)
Dept: PHYSICAL MEDICINE AND REHAB | Facility: MEDICAL CENTER | Age: 53
End: 2023-03-31
Payer: MEDICAID

## 2023-03-31 ENCOUNTER — TELEPHONE (OUTPATIENT)
Dept: PHYSICAL MEDICINE AND REHAB | Facility: MEDICAL CENTER | Age: 53
End: 2023-03-31

## 2023-03-31 VITALS
HEIGHT: 63 IN | OXYGEN SATURATION: 95 % | SYSTOLIC BLOOD PRESSURE: 124 MMHG | BODY MASS INDEX: 18.96 KG/M2 | DIASTOLIC BLOOD PRESSURE: 80 MMHG | TEMPERATURE: 97.7 F | HEART RATE: 83 BPM | WEIGHT: 107 LBS

## 2023-03-31 DIAGNOSIS — G89.4 CHRONIC PAIN SYNDROME: ICD-10-CM

## 2023-03-31 DIAGNOSIS — M05.79 RHEUMATOID ARTHRITIS INVOLVING MULTIPLE SITES WITH POSITIVE RHEUMATOID FACTOR (HCC): ICD-10-CM

## 2023-03-31 DIAGNOSIS — N20.0 NEPHROLITHIASIS: ICD-10-CM

## 2023-03-31 DIAGNOSIS — F32.A DEPRESSION, UNSPECIFIED DEPRESSION TYPE: ICD-10-CM

## 2023-03-31 PROCEDURE — 99214 OFFICE O/P EST MOD 30 MIN: CPT | Performed by: PHYSICAL MEDICINE & REHABILITATION

## 2023-03-31 RX ORDER — OXYCODONE AND ACETAMINOPHEN 10; 325 MG/1; MG/1
1 TABLET ORAL EVERY 4 HOURS PRN
Qty: 42 TABLET | Refills: 0 | Status: SHIPPED | OUTPATIENT
Start: 2023-03-31 | End: 2023-04-07 | Stop reason: SDUPTHER

## 2023-03-31 ASSESSMENT — PATIENT HEALTH QUESTIONNAIRE - PHQ9
CLINICAL INTERPRETATION OF PHQ2 SCORE: 6
5. POOR APPETITE OR OVEREATING: 3 - NEARLY EVERY DAY
SUM OF ALL RESPONSES TO PHQ QUESTIONS 1-9: 26

## 2023-03-31 ASSESSMENT — FIBROSIS 4 INDEX: FIB4 SCORE: 0.33

## 2023-03-31 ASSESSMENT — PAIN SCALES - GENERAL: PAINLEVEL: 10=SEVERE PAIN

## 2023-03-31 NOTE — PROGRESS NOTES
Follow up patient note  Pain Medicine, Interventional spine and sports physiatry, Physical medicine rehabilitation    Date of Service: 03/31/2023    Chief complaint:   Joint pain and neck pain      HISTORY    Please see new patient note dated 06/08/2018 by Dr Kerr,  for more details.     HPI  Patient identification: Mary Martinez 52 y.o. female with RA involving multiple joints, atlantoaxial instability returns for follow-up of her pain related to rheumatoid arthritis.      Interval history:   Mary returns for follow-up.  She has been in the hospital again.  Diagnosed with nephrolithiasis.  She has a archibald and is scheduled to follow-up with Urology next Thursday.  Her opioids were increased related to her hospital stay and nephrolithiasis.  She has been taking oxycodone 10mg q4h.  This has helped manage pain, but it is still severe.      Pain continues in neck, low back, shoulders, hands, abdomen and legs.    Dr. Dela Cruz is her Rheumatologist.    Reports that she works with Mckenzie at [facility] and that she will have a new PCP as Dr. Christopher left [facility].  She has a new PCP there that she will not see until April 2023.    Works with [facility] for behavioral health.  Duloxetine caused side effects and she has resumed paxil.    PHQ-9: 26 denies suicidal plan, but reports that current pain is significantly challenging, continues to work with her psychology and psychiatry team  ORT: 4      Records review:  Hospitalized for severe sepsis due to recurrent urinary tract infection.  Reviewed discharged from Dr. Mann 02/17/2023-02/19/2023    ROS  See HPI    Red Flags :   Fever, Chills, Sweats: Denies  Involuntary Weight Loss: Denies  Bowel/Bladder Incontinence: Denies      PMHx:   Past Medical History:   Diagnosis Date    Acute renal failure (ARF) (HCC)     Allergy     Anemia     Anxiety     Arthritis     Rheumatoid -follows with rheumatologist. Has several fractures  due to RA.    ASTHMA     inhalers prn    Blood transfusion without reported diagnosis     Bowel habit changes     4/24/20-constipation and diarrhea.    Bronchitis last approx 2018    Chronic pain 04/24/2020    Due to RA    Dental disorder     no teeth upper-does not wear her denture. Broken teeth on bottom.    Depression     GERD (gastroesophageal reflux disease)     GIB (gastrointestinal bleeding)     Head ache     Heart burn     taking famotidine    Hiatus hernia syndrome     History of pancreatitis     Hypokalemia     Hyponatremia     Idiopathic chronic pancreatitis (HCC)     Indigestion     taking famotidine    Intractable nausea and vomiting     Kidney disease     Pain     CPS-everywhere    Pneumonia 10/2019    PONV (postoperative nausea and vomiting)     Psychiatric problem     anxiety and depression    Rheumatoid aortitis     Rheumatoid arthritis(714.0) 2003    SMAS (superior mesenteric artery syndrome) (HCC)     Substance abuse (HCC)        PSHx:   Past Surgical History:   Procedure Laterality Date    CT UPPER GI ENDOSCOPY,DIAGNOSIS N/A 9/9/2022    Procedure: ENDOSCOPIC ULTRASOUND- UPPER;  Surgeon: Jorge Brooke M.D.;  Location: [facility];  Service: EUS    EGD W/ENDOSCOPIC ULTRASOUND N/A 9/9/2022    Procedure: EGD, WITH ENDOSCOPIC US;  Surgeon: Jorge Brooke M.D.;  Location: [facility];  Service: EUS    CT ENDOSCOPIC US EXAM, ESOPH  4/29/2020    Procedure: EGD, WITH ENDOSCOPIC US;  Surgeon: Jorge Brooke M.D.;  Location: [facility];  Service: Gastroenterology    GASTROSCOPY-ENDO  4/29/2020    Procedure: GASTROSCOPY;  Surgeon: Jorge Brooke M.D.;  Location: [facility];  Service: Gastroenterology    EGD WITH ASP/BX  4/29/2020    Procedure: EGD, WITH ASPIRATION BIOPSY - POSS FNA;  Surgeon: Jorge Brooke M.D.;  Location: [facility];  Service: Gastroenterology    CT EXPLORATORY OF ABDOMEN N/A 10/4/2019     Procedure: LAPAROTOMY, EXPLORATORY AND REPAIR OF DUODENAL PERFORATION;  Surgeon: James Dumont M.D.;  Location: SURGERY [facility];  Service: General    COLONOSCOPY  2018    with upper endoscopy    FINGER ARTHROPLASTY Right 6/5/2017    Procedure: FINGER ARTHROPLASTY - LONG, RING AND SMALL VOLAR PLATE;  Surgeon: Lobo Rosen M.D.;  Location: SURGERY SAME DAY [facility];  Service:     FINGER AMPUTATION  6/5/2017    Procedure: FINGER AMPUTATION - LONG, RING AND SMALL AT THE PROXIMAL INTERPHALANGEAL JOINT;  Surgeon: Lobo Rosen M.D.;  Location: SURGERY SAME DAY [facility];  Service:     FINGER ARTHROPLASTY Right 4/17/2017    Procedure: FINGER ARTHROPLASTY ;  Surgeon: Lobo Rosen M.D.;  Location: SURGERY SAME DAY [facility];  Service:     FINGER AMPUTATION Right 9/14/2016    Procedure: FINGER AMPUTATION INDEX;  Surgeon: Lobo Rosen M.D.;  Location: SURGERY SAME DAY [facility];  Service:     IRRIGATION & DEBRIDEMENT ORTHO Right 9/11/2016    Procedure: IRRIGATION & DEBRIDEMENT ORTHO - right index finger;  Surgeon: Madhu Rosen M.D.;  Location: [facility];  Service:     FUSION, PIP JOINT, TOE Right 8/29/2016    Procedure: RE-DO INDEX FINGER PROXIMAL INTERPHALANGEAL ARTHRODESIS;  Surgeon: Lobo Rosen M.D.;  Location: SURGERY SAME DAY [facility];  Service:     BONE GRAFT Right 8/29/2016    Procedure: BONE GRAFT - DISTAL RADIUS ;  Surgeon: Lobo Rosen M.D.;  Location: SURGERY SAME DAY [facility];  Service:     ARTHRODESIS Right 5/6/2016    Procedure: ARTHRODESIS INDEX FINGER PROXIMAL INTERPHALANGEAL;  Surgeon: Lobo Rosen M.D.;  Location: SURGERY SAME DAY [facility];  Service:     FOOT RECONSTRUCTION RHEUMATIC Left 7/29/2015    Procedure: FOOT RECONSTRUCTION RHEUMATIC;  Surgeon: Heriberto Alves M.D.;  Location: [facility];  Service:     TOE FUSION Right 5/27/2015    Procedure: TOE FUSION 1ST  METATARSALPHALANGEAL JOINT;  Surgeon: Heriberto Alves M.D.;  Location: SURGERY [facility];  Service:     BONE SPUR EXCISION  5/27/2015    Procedure: BONE SPUR EXCISION METATARSAL HEAD 2-3;  Surgeon: Heriberto Alves M.D.;  Location: SURGERY [facility];  Service:     HAMMERTOE CORRECTION  5/27/2015    Procedure: HAMMERTOE CORRECTION AND SOFT TISSUE RE-ALINGMENT 2-3 ;  Surgeon: Heriberto Alves M.D.;  Location: SURGERY [facility];  Service:     DENTAL EXTRACTION(S)  2014    all of upper teeth    ABDOMINAL ABSCESS DRAINAGE  9/27/2011    Performed by VERO WRIGHT at SURGERY [facility]    EMANUEL BY LAPAROSCOPY  9/27/2011    Performed by VERO WRIGHT at SURGERY [facility]    APPENDECTOMY  2011    Found out it had ruptured prior to abcess surgery    REPEAT C SECTION  2008    REPEAT C SECTION  2005    REPEAT C SECTION  1999    PRIMARY C SECTION  1991    TONSILLECTOMY  1982    WRIST EXPLORATION Left 1980's    fractured wrist-no hardware       Family history   Family History   Problem Relation Age of Onset    Cancer Mother     Heart Disease Mother     Hypertension Mother     Heart Disease Father     Hypertension Father          Medications:   Outpatient Medications Marked as Taking for the 3/31/23 encounter (Office Visit) with Jayy Kerr M.D.   Medication Sig Dispense Refill    oxyCODONE-acetaminophen (PERCOCET-10)  MG Tab Take 1 Tablet by mouth every four hours as needed for Severe Pain for up to 7 days. 42 Tablet 0    DULoxetine (CYMBALTA) 30 MG Cap DR Particles Take 30 mg by mouth every day.      Naloxone (NARCAN) 4 MG/0.1ML Liquid One spray in one nostril for overdose and call 911. 1 Each 1    PARoxetine (PAXIL) 30 MG Tab Take 60 mg by mouth every evening. 2 tablet (60 mg)      multivitamin Tab Take 1 Tablet by mouth every day. 30 Tablet 2    cyanocobalamin (VITAMIN B12) 1000 MCG Tab Take 1 Tablet by mouth every morning with breakfast. 30 Tablet 3    folic acid (FOLVITE) 1 MG  Tab Take 1 Tablet by mouth 2 times a day. 30 Tablet 3    midodrine (PROAMATINE) 10 MG tablet Take 1 Tablet by mouth 3 times a day with meals. 60 Tablet 1    QUEtiapine (SEROQUEL) 100 MG Tab Take 1 Tablet by mouth every evening. 60 Tablet 3    omeprazole (PRILOSEC) 20 MG delayed-release capsule Take 1 Capsule by mouth every day. (Patient taking differently: Take 20 mg by mouth every evening.) 30 Capsule 1    sucralfate (CARAFATE) 1 GM Tab Take 1 Tablet by mouth 4 Times a Day,Before Meals and at Bedtime. 120 Tablet 3       Allergies:   Allergies   Allergen Reactions    Penicillins Anaphylaxis and Hives     Tolerates cephalosporins; reports throat swelling with PCN    Abilify Unspecified     Multiple side effects    Aripiprazole Nausea     Spasms, shaking      Nitrous Oxide Vomiting    Citrus Rash and Itching     Only on Skin Contact       Social Hx:   Social History     Socioeconomic History    Marital status:      Spouse name: Ousmane    Number of children: 5    Years of education: Not on file    Highest education level: Not on file   Occupational History    Not on file   Tobacco Use    Smoking status: Every Day     Packs/day: 1.00     Years: 30.00     Pack years: 30.00     Types: Cigarettes    Smokeless tobacco: Never   Vaping Use    Vaping Use: Never used   Substance and Sexual Activity    Alcohol use: Never    Drug use: Never    Sexual activity: Not Currently   Other Topics Concern     Service No    Blood Transfusions Yes    Caffeine Concern No    Occupational Exposure No    Hobby Hazards No    Sleep Concern No    Stress Concern Yes    Weight Concern No    Special Diet No    Back Care No    Exercise Yes    Bike Helmet Yes    Seat Belt Yes    Self-Exams Yes   Social History Narrative    ** Merged History Encounter **          Social Determinants of Health     Financial Resource Strain: Low Risk     Difficulty of Paying Living Expenses: Not hard at all   Food Insecurity: No Food Insecurity    Worried  "About Running Out of Food in the Last Year: Never true    Ran Out of Food in the Last Year: Never true   Transportation Needs: No Transportation Needs    Lack of Transportation (Medical): No    Lack of Transportation (Non-Medical): No   Physical Activity: Not on file   Stress: Not on file   Social Connections: Not on file   Intimate Partner Violence: Not on file   Housing Stability: Not on file         EXAMINATION     Physical Exam:   Vitals: /80 (BP Location: Right arm, Patient Position: Sitting, BP Cuff Size: Adult long)   Pulse 83   Temp 36.5 °C (97.7 °F) (Temporal)   Ht 1.6 m (5' 3\")   Wt 48.5 kg (107 lb)   SpO2 95%     Constitutional:   Body Habitus: Body mass index is 18.95 kg/m².  Cooperation: Fully cooperates with exam  Appearance: Appears pale, thin.  She is wearing a mask  Respiratory-  breathing comfortable on room air, no audible wheezing  Cardiovascular- no lower extremity edema is observed.     Psychiatric- alert and oriented ×3. Normal affect.     Musculoskeletal:  Decreased ROM of the cervical spine.    Partial hand amputation on the right at the MCPs; ulnar deviation on the left with MCP subluxation, mild atrophy of the hand intrinsics with wasting of the thenar eminence.     Gait is steady without assist device.  Mildly antalgic without loss of balance, walking with hip flexion      MEDICAL DECISION MAKING    DATA    Labs: UDS 10/30/2018 consistent with medications.  Oxycodone and metabolites without other substances   UDS 04/19/2019 consistent with medications. Oxycodone and metabolites without other substances   UDS 07/19/2019 consistent with medications.  Oxycodone and metabolites without other substances   UDS 11/22/2019 consistent with medications.  Oxycodone and metabolites without other substances   UDS 02/20/2020 absent for Oxycodone and metabolites without other substances  UDS 06/10/2020 positive for oxycodone and metabolites without other substances  UDS 08/18/2020 positive " for oxycodone and metabolites, positive for EtG without EtS  UDS 09/07/2021 negative for oxycodone and metabolites, patient was out of medication, no other abnormalities  UDS 05/18/2022: positive for oxycodone and metabolites.    UDS 02/24/2023: positive for noroxycodone, negative for other metabolites.  No other substances    Imaging:    Films reviewed.  These are my reads:    Xray right shoulder 08/20/2020  There is note of severe degenerative change of the glenohumeral joint.  No fracture.  Erosive changes, consistent with known history of RA    MRI cervical spine 07/31/2018:    There is is note of motion at the atlantodental level with 5mm atlantodental inverval in flexion that reduces to 1-2 mm in extension.  Mild retrolisthesis of C4 on C5 and anterolisthesis of C5- on C6.  Disc extrusion at C6-7 with mild central canal stenosis    Xray left shoulder 2/5/2018 Humeral head is elevated.  Glenohumeral joint arthritis.    Reports reviewed:    Xray right shoulder 08/20/2020 [facility]  Impression  Extensive erosive changes of the humeral head and degenerative changes of the glenohumeral joint.  Findings are concerning for inflammatory arthropathy such as rheumatoid arthritis.      Xray cervical spine 11/14/2018  1. No acute fracture  2. Persistent motion at the C1-2 joint as before, suggesting atlantoaxial instability  3. Minimal instability noted at C5-6 level as described.    MRI cervical spine 07/31/2018  1. Widening of the atlantodental interal in neutral and flexion positions with a 5mm space which reduces to 1-2 mm in extension  2. 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 Nephrolithiasis  N20.0   2. Rheumatoid arthritis involving multiple sites with positive rheumatoid factor  ([facility])  M05.79   3. Chronic pain syndrome  G89.4   4. Depression, unspecified depression type  F32.A                 ASSESSMENT and PLAN:     Mary Martinez 52 y.o. female with rheumatoid arthritis    Mary was seen today for follow-up.    Orders and management for this visit:    Orders Placed This Encounter    oxyCODONE-acetaminophen (PERCOCET-10)  MG Tab    Consent for Opiate Prescription    Controlled Substance Treatment Agreement     Reviewed recent ED visit 03/26/2023 and plans to follow-up with Urology next week.  She has an indwelling archibald.  Discussed adjustment to her medications and reviewed .  Plan for percocet 10/325 up to 6/day.  Script given for 7 days.  Most recent UDS reviewed.  Consents on file.  She does have Narcan at home.  Continue activity as tolerated.  Continue with behavioral health team.   Plan to reassess in 7 days.      In prescribing controlled substances to this patient, I certify that I have obtained and reviewed the medical history of Mary Martinez. I have also made a good aristides effort to obtain applicable records from other providers who have treated the patient and records did not demonstrate any increased risk of substance abuse that would prevent me from prescribing controlled substances.     I have conducted a physical exam and documented it. I have reviewed Ms. Martinez’s prescription history as maintained by the [place] Prescription Monitoring Program.   ORT 4, indicates moderate risk    I have assessed the patient’s risk for abuse, dependency, and addiction using the validated Opioid Risk Tool available at https://www.mdcalc.com/vxtppe-wlgn-anjd-ort-narcotic-abuse.     Given the above, I believe the benefits of controlled substance therapy outweigh the risks. The reasons for prescribing controlled substances include non-narcotic, oral analgesic alternatives have been inadequate for pain control and in my professional opinion, controlled substances are the  only reasonable choice for this patient because her pain was note adequately controlled with alternatives  and she has chronic progressive disease. Accordingly, I have discussed the risk and benefits, treatment plan, and alternative therapies with the patient.       Follow up: 7 days    Please note that this dictation was created using voice recognition software. I have made every reasonable attempt to correct obvious errors but there may be errors of grammar and content that I may have overlooked prior to finalization of this note.      Jayy Kerr MD  Interventional Spine and Sports Physiatry  Physical Medicine and Rehabilitation  [facility]      PCP: [facility] [place]

## 2023-03-31 NOTE — TELEPHONE ENCOUNTER
Patient called and said she left several messages with MA for Dr. Kerr and would like a call back please . She was in the ER and they gave her medication and she knows she has a contract with Dr. Kerr and doesn't want to mess that up.         Thank you ,       Cintia

## 2023-04-05 ENCOUNTER — TELEPHONE (OUTPATIENT)
Dept: PHYSICAL MEDICINE AND REHAB | Facility: MEDICAL CENTER | Age: 53
End: 2023-04-05
Payer: MEDICAID

## 2023-04-05 NOTE — TELEPHONE ENCOUNTER
VOICEMAIL  1. Caller Name: Mary Martinez                       Call Back Number: [phone] (home)       2. Message: patient was in the ED for kidney stones `. Patient needs medication changed and would like a call back patient needs medications asap  ``  3. Patient approves office to leave a detailed voicemail/MyChart message: N\A

## 2023-04-07 ENCOUNTER — OFFICE VISIT (OUTPATIENT)
Dept: PHYSICAL MEDICINE AND REHAB | Facility: MEDICAL CENTER | Age: 53
End: 2023-04-07
Payer: MEDICAID

## 2023-04-07 VITALS
HEART RATE: 93 BPM | BODY MASS INDEX: 18.21 KG/M2 | SYSTOLIC BLOOD PRESSURE: 88 MMHG | WEIGHT: 102.8 LBS | TEMPERATURE: 96.8 F | HEIGHT: 63 IN | OXYGEN SATURATION: 97 % | DIASTOLIC BLOOD PRESSURE: 54 MMHG

## 2023-04-07 DIAGNOSIS — G89.4 CHRONIC PAIN SYNDROME: ICD-10-CM

## 2023-04-07 DIAGNOSIS — N20.0 NEPHROLITHIASIS: ICD-10-CM

## 2023-04-07 DIAGNOSIS — M05.79 RHEUMATOID ARTHRITIS INVOLVING MULTIPLE SITES WITH POSITIVE RHEUMATOID FACTOR (HCC): ICD-10-CM

## 2023-04-07 PROCEDURE — 99214 OFFICE O/P EST MOD 30 MIN: CPT | Performed by: PHYSICAL MEDICINE & REHABILITATION

## 2023-04-07 RX ORDER — OXYCODONE AND ACETAMINOPHEN 10; 325 MG/1; MG/1
1 TABLET ORAL EVERY 4 HOURS PRN
Qty: 42 TABLET | Refills: 0 | Status: SHIPPED | OUTPATIENT
Start: 2023-04-07 | End: 2023-04-14

## 2023-04-07 RX ORDER — OXYCODONE AND ACETAMINOPHEN 10; 325 MG/1; MG/1
1 TABLET ORAL EVERY 4 HOURS PRN
Qty: 38 TABLET | Refills: 0 | Status: SHIPPED | OUTPATIENT
Start: 2023-04-14 | End: 2023-04-14 | Stop reason: SDUPTHER

## 2023-04-07 ASSESSMENT — PATIENT HEALTH QUESTIONNAIRE - PHQ9
5. POOR APPETITE OR OVEREATING: 3 - NEARLY EVERY DAY
CLINICAL INTERPRETATION OF PHQ2 SCORE: 2
SUM OF ALL RESPONSES TO PHQ QUESTIONS 1-9: 16

## 2023-04-07 ASSESSMENT — FIBROSIS 4 INDEX: FIB4 SCORE: 0.33

## 2023-04-07 ASSESSMENT — PAIN SCALES - GENERAL: PAINLEVEL: 8=MODERATE-SEVERE PAIN

## 2023-04-07 NOTE — PROGRESS NOTES
Follow up patient note  Pain Medicine, Interventional spine and sports physiatry, Physical medicine rehabilitation    Date of Service: 04/07/2023    Chief complaint:   Joint pain and neck pain      HISTORY    Please see new patient note dated 06/08/2018 by Dr Kerr,  for more details.     HPI  Patient identification: Mary Martinez 52 y.o. female with RA involving multiple joints, atlantoaxial instability returns for follow-up of her pain related to rheumatoid arthritis.      Interval history:     Mary returns for follow-up.  Since the last visit, she has seen Urology.  Reports that her archibald has been removed.  By her report, she has been told that while some of her stones are passing, there are some on the left that have not passed and procedure upcoming.  She is not sure when this will be scheduled.      Taking current doses of percocet 10/325, 6/day has allowed her to feel pain is manageable.  The pain is a little bit better than it was, she thinks.  When the pain wears off, she lays on her left side and this seems to help somewhat.    Bowel movements are regular.      Pain continues in neck, low back, shoulders, hands, abdomen and legs, unchanged.    Dr. Dela Cruz is her Rheumatologist.    Reports that she works with Mckenzie at [facility] and that she will have a new PCP as Dr. Christopher left [facility].  She has a new PCP there that she will not see until April 2023.    Works with [facility] for behavioral health.  Duloxetine caused side effects and she has resumed paxil.    PHQ-9: 26 denies suicidal plan, but reports that current pain is significantly challenging, continues to work with her psychology and psychiatry team  ORT: 4      Records review:  Hospitalized for severe sepsis due to recurrent urinary tract infection.  Reviewed discharged from Dr. Mann 02/17/2023-02/19/2023    ROS  See HPI    Red Flags :   Fever, Chills, Sweats: Denies  Involuntary Weight  Loss: Denies  Bowel/Bladder Incontinence: Denies      PMHx:   Past Medical History:   Diagnosis Date    Acute renal failure (ARF) ([facility])     Allergy     Anemia     Anxiety     Arthritis     Rheumatoid -follows with rheumatologist. Has several fractures due to RA.    ASTHMA     inhalers prn    Blood transfusion without reported diagnosis     Bowel habit changes     4/24/20-constipation and diarrhea.    Bronchitis last approx 2018    Chronic pain 04/24/2020    Due to RA    Dental disorder     no teeth upper-does not wear her denture. Broken teeth on bottom.    Depression     GERD (gastroesophageal reflux disease)     GIB (gastrointestinal bleeding)     Head ache     Heart burn     taking famotidine    Hiatus hernia syndrome     History of pancreatitis     Hypokalemia     Hyponatremia     Idiopathic chronic pancreatitis (HCC)     Indigestion     taking famotidine    Intractable nausea and vomiting     Kidney disease     Pain     CPS-everywhere    Pneumonia 10/2019    PONV (postoperative nausea and vomiting)     Psychiatric problem     anxiety and depression    Rheumatoid aortitis     Rheumatoid arthritis(714.0) 2003    SMAS (superior mesenteric artery syndrome) ([facility])     Substance abuse ([facility])        PSHx:   Past Surgical History:   Procedure Laterality Date    [facility] UPPER GI ENDOSCOPY,DIAGNOSIS N/A 9/9/2022    Procedure: ENDOSCOPIC ULTRASOUND- UPPER;  Surgeon: Jorge Brooke M.D.;  Location: [facility];  Service: EUS    EGD W/ENDOSCOPIC ULTRASOUND N/A 9/9/2022    Procedure: EGD, WITH ENDOSCOPIC US;  Surgeon: Jorge Brooke M.D.;  Location: [facility];  Service: EUS    VA ENDOSCOPIC US EXAM, ESOPH  4/29/2020    Procedure: EGD, WITH ENDOSCOPIC US;  Surgeon: Jorge Brooke M.D.;  Location: [facility];  Service: Gastroenterology    GASTROSCOPY-ENDO  4/29/2020    Procedure: GASTROSCOPY;  Surgeon: Jorge Brooke M.D.;  Location: [facility];  Service:  Gastroenterology    EGD WITH ASP/BX  4/29/2020    Procedure: EGD, WITH ASPIRATION BIOPSY - POSS FNA;  Surgeon: Jorge Brooke M.D.;  Location: SURGERY [facility];  Service: Gastroenterology    [place] EXPLORATORY OF ABDOMEN N/A 10/4/2019    Procedure: LAPAROTOMY, EXPLORATORY AND REPAIR OF DUODENAL PERFORATION;  Surgeon: James Dumont M.D.;  Location: SURGERY [facility];  Service: General    COLONOSCOPY  2018    with upper endoscopy    FINGER ARTHROPLASTY Right 6/5/2017    Procedure: FINGER ARTHROPLASTY - LONG, RING AND SMALL VOLAR PLATE;  Surgeon: Lobo Rsoen M.D.;  Location: SURGERY SAME DAY [facility];  Service:     FINGER AMPUTATION  6/5/2017    Procedure: FINGER AMPUTATION - LONG, RING AND SMALL AT THE PROXIMAL INTERPHALANGEAL JOINT;  Surgeon: Lobo Rosen M.D.;  Location: SURGERY SAME DAY [facility];  Service:     FINGER ARTHROPLASTY Right 4/17/2017    Procedure: FINGER ARTHROPLASTY ;  Surgeon: Lobo Rosen M.D.;  Location: SURGERY SAME DAY [facility];  Service:     FINGER AMPUTATION Right 9/14/2016    Procedure: FINGER AMPUTATION INDEX;  Surgeon: Lobo Rosen M.D.;  Location: SURGERY SAME DAY [facility];  Service:     IRRIGATION & DEBRIDEMENT ORTHO Right 9/11/2016    Procedure: IRRIGATION & DEBRIDEMENT ORTHO - right index finger;  Surgeon: Madhu Rosen M.D.;  Location: [facility];  Service:     FUSION, PIP JOINT, TOE Right 8/29/2016    Procedure: RE-DO INDEX FINGER PROXIMAL INTERPHALANGEAL ARTHRODESIS;  Surgeon: Lobo Rosen M.D.;  Location: SURGERY SAME DAY [facility];  Service:     BONE GRAFT Right 8/29/2016    Procedure: BONE GRAFT - DISTAL RADIUS ;  Surgeon: Lobo Rosen M.D.;  Location: SURGERY SAME DAY [facility];  Service:     ARTHRODESIS Right 5/6/2016    Procedure: ARTHRODESIS INDEX FINGER PROXIMAL INTERPHALANGEAL;  Surgeon: Lobo Rosen M.D.;  Location: SURGERY SAME DAY [facility];  Service:      FOOT RECONSTRUCTION RHEUMATIC Left 7/29/2015    Procedure: FOOT RECONSTRUCTION RHEUMATIC;  Surgeon: Heriberto Alves M.D.;  Location: SURGERY [facility];  Service:     TOE FUSION Right 5/27/2015    Procedure: TOE FUSION 1ST METATARSALPHALANGEAL JOINT;  Surgeon: Heriberto Alves M.D.;  Location: SURGERY [facility];  Service:     BONE SPUR EXCISION  5/27/2015    Procedure: BONE SPUR EXCISION METATARSAL HEAD 2-3;  Surgeon: Heriberto Alves M.D.;  Location: SURGERY [facility];  Service:     HAMMERTOE CORRECTION  5/27/2015    Procedure: HAMMERTOE CORRECTION AND SOFT TISSUE RE-ALINGMENT 2-3 ;  Surgeon: Heriberto Alves M.D.;  Location: SURGERY [facility];  Service:     DENTAL EXTRACTION(S)  2014    all of upper teeth    ABDOMINAL ABSCESS DRAINAGE  9/27/2011    Performed by VERO WRIGHT at SURGERY [facility]    EMANUEL BY LAPAROSCOPY  9/27/2011    Performed by VERO WRIGHT at SURGERY [facility]    APPENDECTOMY  2011    Found out it had ruptured prior to abcess surgery    REPEAT C SECTION  2008    REPEAT C SECTION  2005    REPEAT C SECTION  1999    PRIMARY C SECTION  1991    TONSILLECTOMY  1982    WRIST EXPLORATION Left 1980's    fractured wrist-no hardware       Family history   Family History   Problem Relation Age of Onset    Cancer Mother     Heart Disease Mother     Hypertension Mother     Heart Disease Father     Hypertension Father          Medications:   Outpatient Medications Marked as Taking for the 4/7/23 encounter (Office Visit) with Jayy Kerr M.D.   Medication Sig Dispense Refill    [START ON 4/14/2023] oxyCODONE-acetaminophen (PERCOCET-10)  MG Tab Take 1 Tablet by mouth every four hours as needed for Severe Pain for up to 7 days. 38 Tablet 0    oxyCODONE-acetaminophen (PERCOCET-10)  MG Tab Take 1 Tablet by mouth every four hours as needed for Severe Pain for up to 7 days. 42 Tablet 0       Allergies:   Allergies   Allergen Reactions    Penicillins  Anaphylaxis and Hives     Tolerates cephalosporins; reports throat swelling with PCN    Abilify Unspecified     Multiple side effects    Aripiprazole Nausea     Spasms, shaking      Nitrous Oxide Vomiting    Citrus Rash and Itching     Only on Skin Contact       Social Hx:   Social History     Socioeconomic History    Marital status:      Spouse name: Ousmane    Number of children: 5    Years of education: Not on file    Highest education level: Not on file   Occupational History    Not on file   Tobacco Use    Smoking status: Every Day     Packs/day: 1.00     Years: 30.00     Pack years: 30.00     Types: Cigarettes    Smokeless tobacco: Never   Vaping Use    Vaping Use: Never used   Substance and Sexual Activity    Alcohol use: Never    Drug use: Never    Sexual activity: Not Currently   Other Topics Concern     Service No    Blood Transfusions Yes    Caffeine Concern No    Occupational Exposure No    Hobby Hazards No    Sleep Concern No    Stress Concern Yes    Weight Concern No    Special Diet No    Back Care No    Exercise Yes    Bike Helmet Yes    Seat Belt Yes    Self-Exams Yes   Social History Narrative    ** Merged History Encounter **          Social Determinants of Health     Financial Resource Strain: Low Risk     Difficulty of Paying Living Expenses: Not hard at all   Food Insecurity: No Food Insecurity    Worried About Running Out of Food in the Last Year: Never true    Ran Out of Food in the Last Year: Never true   Transportation Needs: No Transportation Needs    Lack of Transportation (Medical): No    Lack of Transportation (Non-Medical): No   Physical Activity: Not on file   Stress: Not on file   Social Connections: Not on file   Intimate Partner Violence: Not on file   Housing Stability: Not on file         EXAMINATION     Physical Exam:   Vitals: BP (!) 88/54 (BP Location: Right arm, Patient Position: Sitting, BP Cuff Size: Adult)   Pulse 93   Temp 36 °C (96.8 °F) (Temporal)   Ht  "1.6 m (5' 3\")   Wt 46.6 kg (102 lb 12.8 oz)   SpO2 97%     Constitutional:   Body Habitus: Body mass index is 18.21 kg/m².  Cooperation: Fully cooperates with exam  Appearance: Appears pale, thin.    Respiratory-  breathing comfortable on room air, no audible wheezing  Cardiovascular- no lower extremity edema is observed.     Psychiatric- alert and oriented ×3. Normal affect.     Musculoskeletal:  Decreased ROM of the cervical spine.    Partial hand amputation on the right at the MCPs; ulnar deviation on the left with MCP subluxation, mild atrophy of the hand intrinsics with wasting of the thenar eminence.     Gait is steady without assist device.  Mildly antalgic without loss of balance, walking with mildly increased hip flexion      MEDICAL DECISION MAKING    DATA    Labs: UDS 10/30/2018 consistent with medications.  Oxycodone and metabolites without other substances   UDS 04/19/2019 consistent with medications. Oxycodone and metabolites without other substances   UDS 07/19/2019 consistent with medications.  Oxycodone and metabolites without other substances   UDS 11/22/2019 consistent with medications.  Oxycodone and metabolites without other substances   UDS 02/20/2020 absent for Oxycodone and metabolites without other substances  UDS 06/10/2020 positive for oxycodone and metabolites without other substances  UDS 08/18/2020 positive for oxycodone and metabolites, positive for EtG without EtS  UDS 09/07/2021 negative for oxycodone and metabolites, patient was out of medication, no other abnormalities  UDS 05/18/2022: positive for oxycodone and metabolites.    UDS 02/24/2023: positive for noroxycodone, negative for other metabolites.  No other substances    Imaging:    Films reviewed.  These are my reads:    Xray right shoulder 08/20/2020  There is note of severe degenerative change of the glenohumeral joint.  No fracture.  Erosive changes, consistent with known history of RA    MRI cervical spine " 07/31/2018:    There is is note of motion at the atlantodental level with 5mm atlantodental inverval in flexion that reduces to 1-2 mm in extension.  Mild retrolisthesis of C4 on C5 and anterolisthesis of C5- on C6.  Disc extrusion at C6-7 with mild central canal stenosis    Xray left shoulder 2/5/2018 Humeral head is elevated.  Glenohumeral joint arthritis.    Reports reviewed:    Xray right shoulder 08/20/2020 [facility]  Impression  Extensive erosive changes of the humeral head and degenerative changes of the glenohumeral joint.  Findings are concerning for inflammatory arthropathy such as rheumatoid arthritis.      Xray cervical spine 11/14/2018  1. No acute fracture  2. Persistent motion at the C1-2 joint as before, suggesting atlantoaxial instability  3. Minimal instability noted at C5-6 level as described.    MRI cervical spine 07/31/2018  1. Widening of the atlantodental interal in neutral and flexion positions with a 5mm space which reduces to 1-2 mm in extension  2. 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 Rheumatoid arthritis involving multiple sites with positive rheumatoid factor (HCC)  M05.79   2. Chronic pain syndrome  G89.4   3. Nephrolithiasis  N20.0                   ASSESSMENT and PLAN:     Mary Mckenzielins 52 y.o. female with rheumatoid arthritis    Mary was seen today for follow-up.    Orders and management for this visit:    Orders Placed This Encounter    oxyCODONE-acetaminophen (PERCOCET-10)  MG Tab    oxyCODONE-acetaminophen (PERCOCET-10)  MG Tab    Consent for Opiate Prescription    Controlled Substance Treatment Agreement     Discussed that she has plans to continue follow-up with Urology.  She has been able to manage with her current pain  regimen.  In the past, she had been taking 5/day but had been off of opioids for a few months and this had allowed for a lower dose of opioids.  Most recent UDS reviewed.  We discussed that I will write short scripts for 7 days for the next two weeks.  Goal will be to start reducing dose in the second week.  She will start to reduce in the first week as able.  Goal of returning to lower baseline as tolerated.   reviewed.  Consents on file.  She has Narcan at home.  Continue activity as tolerated.  Follow-up with behavioral health team    In prescribing controlled substances to this patient, I certify that I have obtained and reviewed the medical history of Mary Martinez. I have also made a good aristides effort to obtain applicable records from other providers who have treated the patient and records did not demonstrate any increased risk of substance abuse that would prevent me from prescribing controlled substances.     I have conducted a physical exam and documented it. I have reviewed Ms. Martinez’s prescription history as maintained by the [place] Prescription Monitoring Program.   ORT 4, indicates moderate risk    I have assessed the patient’s risk for abuse, dependency, and addiction using the validated Opioid Risk Tool available at https://www.mdcalc.com/mybsbh-laaa-gaic-ort-narcotic-abuse.     Given the above, I believe the benefits of controlled substance therapy outweigh the risks. The reasons for prescribing controlled substances include non-narcotic, oral analgesic alternatives have been inadequate for pain control and in my professional opinion, controlled substances are the only reasonable choice for this patient because her pain was note adequately controlled with alternatives  and she has chronic progressive disease. Accordingly, I have discussed the risk and benefits, treatment plan, and alternative therapies with the patient.       Follow up: 2 weeks    Please note that this dictation was  created using voice recognition software. I have made every reasonable attempt to correct obvious errors but there may be errors of grammar and content that I may have overlooked prior to finalization of this note.      Jayy Kerr MD  Interventional Spine and Sports Physiatry  Physical Medicine and Rehabilitation  [facility]      PCP: [facility]

## 2023-04-14 DIAGNOSIS — G89.4 CHRONIC PAIN SYNDROME: ICD-10-CM

## 2023-04-14 DIAGNOSIS — M05.79 RHEUMATOID ARTHRITIS INVOLVING MULTIPLE SITES WITH POSITIVE RHEUMATOID FACTOR (HCC): ICD-10-CM

## 2023-04-14 DIAGNOSIS — N20.0 NEPHROLITHIASIS: ICD-10-CM

## 2023-04-14 RX ORDER — OXYCODONE AND ACETAMINOPHEN 10; 325 MG/1; MG/1
1 TABLET ORAL EVERY 4 HOURS PRN
Qty: 38 TABLET | Refills: 0 | Status: SHIPPED | OUTPATIENT
Start: 2023-04-14 | End: 2023-04-19 | Stop reason: SDUPTHER

## 2023-04-14 NOTE — TELEPHONE ENCOUNTER
Percocet-10    Received request via: Patient    Was the patient seen in the last year in this department? Yes    Does the patient have an active prescription (recently filled or refills available) for medication(s) requested?  Yes    Does the patient have [facility] and need 100 day supply (blood pressure, diabetes and cholesterol meds only)? Patient does not have SCP

## 2023-04-19 ENCOUNTER — OFFICE VISIT (OUTPATIENT)
Dept: PHYSICAL MEDICINE AND REHAB | Facility: MEDICAL CENTER | Age: 53
End: 2023-04-19
Payer: MEDICAID

## 2023-04-19 VITALS
HEIGHT: 63 IN | WEIGHT: 100 LBS | SYSTOLIC BLOOD PRESSURE: 90 MMHG | HEART RATE: 82 BPM | DIASTOLIC BLOOD PRESSURE: 52 MMHG | TEMPERATURE: 97.5 F | BODY MASS INDEX: 17.72 KG/M2 | OXYGEN SATURATION: 97 %

## 2023-04-19 DIAGNOSIS — M05.79 RHEUMATOID ARTHRITIS INVOLVING MULTIPLE SITES WITH POSITIVE RHEUMATOID FACTOR (HCC): ICD-10-CM

## 2023-04-19 DIAGNOSIS — F33.2 SEVERE EPISODE OF RECURRENT MAJOR DEPRESSIVE DISORDER, WITHOUT PSYCHOTIC FEATURES (HCC): ICD-10-CM

## 2023-04-19 DIAGNOSIS — G89.4 CHRONIC PAIN SYNDROME: ICD-10-CM

## 2023-04-19 DIAGNOSIS — N20.0 NEPHROLITHIASIS: ICD-10-CM

## 2023-04-19 PROCEDURE — 99214 OFFICE O/P EST MOD 30 MIN: CPT | Performed by: PHYSICAL MEDICINE & REHABILITATION

## 2023-04-19 RX ORDER — OXYCODONE AND ACETAMINOPHEN 10; 325 MG/1; MG/1
1 TABLET ORAL EVERY 4 HOURS PRN
Qty: 35 TABLET | Refills: 0 | Status: SHIPPED | OUTPATIENT
Start: 2023-04-20 | End: 2023-04-27

## 2023-04-19 RX ORDER — OXYCODONE AND ACETAMINOPHEN 10; 325 MG/1; MG/1
1 TABLET ORAL EVERY 4 HOURS PRN
Qty: 35 TABLET | Refills: 0 | Status: SHIPPED | OUTPATIENT
Start: 2023-04-27 | End: 2023-05-03 | Stop reason: SDUPTHER

## 2023-04-19 ASSESSMENT — PATIENT HEALTH QUESTIONNAIRE - PHQ9
5. POOR APPETITE OR OVEREATING: 3 - NEARLY EVERY DAY
SUM OF ALL RESPONSES TO PHQ QUESTIONS 1-9: 23
CLINICAL INTERPRETATION OF PHQ2 SCORE: 6

## 2023-04-19 ASSESSMENT — FIBROSIS 4 INDEX: FIB4 SCORE: 0.33

## 2023-04-19 ASSESSMENT — PAIN SCALES - GENERAL: PAINLEVEL: 8=MODERATE-SEVERE PAIN

## 2023-04-19 NOTE — PROGRESS NOTES
Follow up patient note  Pain Medicine, Interventional spine and sports physiatry, Physical medicine rehabilitation    Date of Service: 04/19/2023    Chief complaint:   Joint pain and neck pain      HISTORY    Please see new patient note dated 06/08/2018 by Dr Kerr,  for more details.     HPI  Patient identification: Mary Martinez 52 y.o. female with RA involving multiple joints, atlantoaxial instability returns for follow-up of her pain related to rheumatoid arthritis.      Interval history:     Mary returns for follow-up.  She has been trying to reduce her use of percocet 10/325, now taking closer to 5/day on average.  She continues to have ongoing pain complaints in multiple joints at baseline.    Pain continues in neck, low back, shoulders, hands, abdomen and legs, unchanged.  Her low back and left-sided pain in the flank continue.  She has plans to follow-up with Urology.  No dysuria. No hematuria..    Pain is manageable, she has been laying on her left side, which releases pain somewhat.  Spending more time in this position.  Appetite has decreased again.    Bowel movements are regular.        Dr. Dela Cruz is her Rheumatologist.    Reports that she works with Mckenzie at [facility] and that she will have a new PCP as Dr. Christopher left [facility].  She has a new PCP there that she will not see until April 2023.    Works with [facility] for behavioral health.  Duloxetine caused side effects and she has resumed paxil.    PHQ-9: 23 denies suicidal plan, but reports that current pain is significantly challenging, continues to work with her psychology and psychiatry team  ORT: 4      Records review:  Hospitalized for severe sepsis due to recurrent urinary tract infection.  Reviewed discharged from Dr. Mann 02/17/2023-02/19/2023    ROS  See HPI    Red Flags :   Fever, Chills, Sweats: Denies  Involuntary Weight Loss: Denies  Bowel/Bladder Incontinence:  Denies      PMHx:   Past Medical History:   Diagnosis Date    Acute renal failure (ARF) ([facility])     Allergy     Anemia     Anxiety     Arthritis     Rheumatoid -follows with rheumatologist. Has several fractures due to RA.    ASTHMA     inhalers prn    Blood transfusion without reported diagnosis     Bowel habit changes     4/24/20-constipation and diarrhea.    Bronchitis last approx 2018    Chronic pain 04/24/2020    Due to RA    Dental disorder     no teeth upper-does not wear her denture. Broken teeth on bottom.    Depression     GERD (gastroesophageal reflux disease)     GIB (gastrointestinal bleeding)     Head ache     Heart burn     taking famotidine    Hiatus hernia syndrome     History of pancreatitis     Hypokalemia     Hyponatremia     Idiopathic chronic pancreatitis (HCC)     Indigestion     taking famotidine    Intractable nausea and vomiting     Kidney disease     Pain     CPS-everywhere    Pneumonia 10/2019    PONV (postoperative nausea and vomiting)     Psychiatric problem     anxiety and depression    Rheumatoid aortitis     Rheumatoid arthritis(714.0) 2003    SMAS (superior mesenteric artery syndrome) ([facility])     Substance abuse ([facility])        PSHx:   Past Surgical History:   Procedure Laterality Date    ME UPPER GI ENDOSCOPY,DIAGNOSIS N/A 9/9/2022    Procedure: ENDOSCOPIC ULTRASOUND- UPPER;  Surgeon: Jorge Brooke M.D.;  Location: [facility];  Service: EUS    EGD W/ENDOSCOPIC ULTRASOUND N/A 9/9/2022    Procedure: EGD, WITH ENDOSCOPIC US;  Surgeon: Jorge Brooke M.D.;  Location: [facility];  Service: EUS    ME ENDOSCOPIC US EXAM, ESOPH  4/29/2020    Procedure: EGD, WITH ENDOSCOPIC US;  Surgeon: Jorge Brooke M.D.;  Location: [facility];  Service: Gastroenterology    GASTROSCOPY-ENDO  4/29/2020    Procedure: GASTROSCOPY;  Surgeon: Jorge Brooke M.D.;  Location: [facility];  Service: Gastroenterology    EGD WITH ASP/BX   4/29/2020    Procedure: EGD, WITH ASPIRATION BIOPSY - POSS FNA;  Surgeon: Jorge Brooke M.D.;  Location: SURGERY [facility];  Service: Gastroenterology    CO EXPLORATORY OF ABDOMEN N/A 10/4/2019    Procedure: LAPAROTOMY, EXPLORATORY AND REPAIR OF DUODENAL PERFORATION;  Surgeon: James Dumont M.D.;  Location: [facility];  Service: General    COLONOSCOPY  2018    with upper endoscopy    FINGER ARTHROPLASTY Right 6/5/2017    Procedure: FINGER ARTHROPLASTY - LONG, RING AND SMALL VOLAR PLATE;  Surgeon: Lobo Rosen M.D.;  Location: SURGERY SAME DAY [facility];  Service:     FINGER AMPUTATION  6/5/2017    Procedure: FINGER AMPUTATION - LONG, RING AND SMALL AT THE PROXIMAL INTERPHALANGEAL JOINT;  Surgeon: Lobo Rosen M.D.;  Location: SURGERY SAME DAY [facility];  Service:     FINGER ARTHROPLASTY Right 4/17/2017    Procedure: FINGER ARTHROPLASTY ;  Surgeon: Lboo Rosen M.D.;  Location: SURGERY SAME DAY [facility];  Service:     FINGER AMPUTATION Right 9/14/2016    Procedure: FINGER AMPUTATION INDEX;  Surgeon: Lobo Rosen M.D.;  Location: SURGERY SAME DAY [facility];  Service:     IRRIGATION & DEBRIDEMENT ORTHO Right 9/11/2016    Procedure: IRRIGATION & DEBRIDEMENT ORTHO - right index finger;  Surgeon: Madhu Rosen M.D.;  Location: [facility];  Service:     FUSION, PIP JOINT, TOE Right 8/29/2016    Procedure: RE-DO INDEX FINGER PROXIMAL INTERPHALANGEAL ARTHRODESIS;  Surgeon: Lobo Rosen M.D.;  Location: SURGERY SAME DAY [facility];  Service:     BONE GRAFT Right 8/29/2016    Procedure: BONE GRAFT - DISTAL RADIUS ;  Surgeon: Lobo Rosen M.D.;  Location: SURGERY SAME DAY [facility];  Service:     ARTHRODESIS Right 5/6/2016    Procedure: ARTHRODESIS INDEX FINGER PROXIMAL INTERPHALANGEAL;  Surgeon: Lobo Rosen M.D.;  Location: SURGERY SAME DAY [facility];  Service:     FOOT RECONSTRUCTION RHEUMATIC  Left 7/29/2015    Procedure: FOOT RECONSTRUCTION RHEUMATIC;  Surgeon: Heriberto Alves M.D.;  Location: SURGERY [facility];  Service:     TOE FUSION Right 5/27/2015    Procedure: TOE FUSION 1ST METATARSALPHALANGEAL JOINT;  Surgeon: Heriberto Alves M.D.;  Location: SURGERY [facility];  Service:     BONE SPUR EXCISION  5/27/2015    Procedure: BONE SPUR EXCISION METATARSAL HEAD 2-3;  Surgeon: Heriberto Alves M.D.;  Location: SURGERY [facility];  Service:     HAMMERTOE CORRECTION  5/27/2015    Procedure: HAMMERTOE CORRECTION AND SOFT TISSUE RE-ALINGMENT 2-3 ;  Surgeon: Heriberto Alves M.D.;  Location: SURGERY [facility];  Service:     DENTAL EXTRACTION(S)  2014    all of upper teeth    ABDOMINAL ABSCESS DRAINAGE  9/27/2011    Performed by VERO WRIGHT at SURGERY [facility]    EMANUEL BY LAPAROSCOPY  9/27/2011    Performed by VERO WRIGHT at SURGERY [facility]    APPENDECTOMY  2011    Found out it had ruptured prior to abcess surgery    REPEAT C SECTION  2008    REPEAT C SECTION  2005    REPEAT C SECTION  1999    PRIMARY C SECTION  1991    TONSILLECTOMY  1982    WRIST EXPLORATION Left 1980's    fractured wrist-no hardware       Family history   Family History   Problem Relation Age of Onset    Cancer Mother     Heart Disease Mother     Hypertension Mother     Heart Disease Father     Hypertension Father          Medications:   Outpatient Medications Marked as Taking for the 4/19/23 encounter (Office Visit) with Jayy Kerr M.D.   Medication Sig Dispense Refill    [START ON 4/20/2023] oxyCODONE-acetaminophen (PERCOCET-10)  MG Tab Take 1 Tablet by mouth every four hours as needed for Severe Pain for up to 7 days. 35 Tablet 0    [START ON 4/27/2023] oxyCODONE-acetaminophen (PERCOCET-10)  MG Tab Take 1 Tablet by mouth every four hours as needed for Severe Pain for up to 7 days. 35 Tablet 0       Allergies:   Allergies   Allergen Reactions    Penicillins Anaphylaxis and  "Hives     Tolerates cephalosporins; reports throat swelling with PCN    Abilify Unspecified     Multiple side effects    Aripiprazole Nausea     Spasms, shaking      Nitrous Oxide Vomiting    Citrus Rash and Itching     Only on Skin Contact       Social Hx:   Social History     Socioeconomic History    Marital status:      Spouse name: Ousmane    Number of children: 5    Years of education: Not on file    Highest education level: Not on file   Occupational History    Not on file   Tobacco Use    Smoking status: Every Day     Packs/day: 1.00     Years: 30.00     Pack years: 30.00     Types: Cigarettes    Smokeless tobacco: Never   Vaping Use    Vaping Use: Never used   Substance and Sexual Activity    Alcohol use: Never    Drug use: Never    Sexual activity: Not Currently   Other Topics Concern     Service No    Blood Transfusions Yes    Caffeine Concern No    Occupational Exposure No    Hobby Hazards No    Sleep Concern No    Stress Concern Yes    Weight Concern No    Special Diet No    Back Care No    Exercise Yes    Bike Helmet Yes    Seat Belt Yes    Self-Exams Yes   Social History Narrative    ** Merged History Encounter **          Social Determinants of Health     Financial Resource Strain: Low Risk     Difficulty of Paying Living Expenses: Not hard at all   Food Insecurity: No Food Insecurity    Worried About Running Out of Food in the Last Year: Never true    Ran Out of Food in the Last Year: Never true   Transportation Needs: No Transportation Needs    Lack of Transportation (Medical): No    Lack of Transportation (Non-Medical): No   Physical Activity: Not on file   Stress: Not on file   Social Connections: Not on file   Intimate Partner Violence: Not on file   Housing Stability: Not on file         EXAMINATION     Physical Exam:   Vitals: BP 90/52 (BP Location: Right arm, Patient Position: Sitting, BP Cuff Size: Adult)   Pulse 82   Temp 36.4 °C (97.5 °F) (Temporal)   Ht 1.6 m (5' 3\")   Wt " 45.4 kg (100 lb)   SpO2 97%     Constitutional:   Body Habitus: Body mass index is 17.71 kg/m².  Cooperation: Fully cooperates with exam  Appearance: Appears pale, thin.    Respiratory-  breathing comfortable on room air, no audible wheezing  Cardiovascular- no lower extremity edema is observed.     Psychiatric- alert and oriented ×3. Normal affect.     Musculoskeletal:    Partial hand amputation on the right at the MCPs; ulnar deviation on the left with MCP subluxation, mild atrophy of the hand intrinsics with wasting of the thenar eminence.     Gait is steady without assist device.  Mildly antalgic without loss of balance, walking with mildly increased hip flexion      MEDICAL DECISION MAKING    DATA    Labs: UDS 10/30/2018 consistent with medications.  Oxycodone and metabolites without other substances   UDS 04/19/2019 consistent with medications. Oxycodone and metabolites without other substances   UDS 07/19/2019 consistent with medications.  Oxycodone and metabolites without other substances   UDS 11/22/2019 consistent with medications.  Oxycodone and metabolites without other substances   UDS 02/20/2020 absent for Oxycodone and metabolites without other substances  UDS 06/10/2020 positive for oxycodone and metabolites without other substances  UDS 08/18/2020 positive for oxycodone and metabolites, positive for EtG without EtS  UDS 09/07/2021 negative for oxycodone and metabolites, patient was out of medication, no other abnormalities  UDS 05/18/2022: positive for oxycodone and metabolites.    UDS 02/24/2023: positive for noroxycodone, negative for other metabolites.  No other substances    Imaging:    Films reviewed.  These are my reads:    Xray right shoulder 08/20/2020  There is note of severe degenerative change of the glenohumeral joint.  No fracture.  Erosive changes, consistent with known history of RA    MRI cervical spine 07/31/2018:    There is is note of motion at the atlantodental level with 5mm  atlantodental inverval in flexion that reduces to 1-2 mm in extension.  Mild retrolisthesis of C4 on C5 and anterolisthesis of C5- on C6.  Disc extrusion at C6-7 with mild central canal stenosis    Xray left shoulder 2/5/2018 Humeral head is elevated.  Glenohumeral joint arthritis.    Reports reviewed:    Xray right shoulder 08/20/2020 [facility]  Impression  Extensive erosive changes of the humeral head and degenerative changes of the glenohumeral joint.  Findings are concerning for inflammatory arthropathy such as rheumatoid arthritis.      Xray cervical spine 11/14/2018  1. No acute fracture  2. Persistent motion at the C1-2 joint as before, suggesting atlantoaxial instability  3. Minimal instability noted at C5-6 level as described.    MRI cervical spine 07/31/2018  1. Widening of the atlantodental interal in neutral and flexion positions with a 5mm space which reduces to 1-2 mm in extension  2. 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 Rheumatoid arthritis involving multiple sites with positive rheumatoid factor (HCC)  M05.79   2. Chronic pain syndrome  G89.4   3. Nephrolithiasis  N20.0                     ASSESSMENT and PLAN:     Mary Martinez 52 y.o. female with rheumatoid arthritis    Mary was seen today for follow-up.    Orders and management for this visit:    Orders Placed This Encounter    oxyCODONE-acetaminophen (PERCOCET-10)  MG Tab    oxyCODONE-acetaminophen (PERCOCET-10)  MG Tab    Consent for Opiate Prescription    Controlled Substance Treatment Agreement     1 .Discussed plan to continued with gradual wean of percocet as tolerated.   reviewed.  Most recent UDS reviewed.  Reduce to 5/day.  Scripts given the one week for the next two weeks.  2.  Encouraged her to call Urology for follow-up plans.  3. Follow-up with PCP.  4. Continue with behavioral health.    In prescribing controlled substances to this patient, I certify that I have obtained and reviewed the medical history of Mary Martinez. I have also made a good aristides effort to obtain applicable records from other providers who have treated the patient and records did not demonstrate any increased risk of substance abuse that would prevent me from prescribing controlled substances.     I have conducted a physical exam and documented it. I have reviewed Ms. Martinez’s prescription history as maintained by the [place] Prescription Monitoring Program.   ORT 4, indicates moderate risk    I have assessed the patient’s risk for abuse, dependency, and addiction using the validated Opioid Risk Tool available at https://www.mdcalc.com/xnibkq-cfnt-edrx-ort-narcotic-abuse.     Given the above, I believe the benefits of controlled substance therapy outweigh the risks. The reasons for prescribing controlled substances include non-narcotic, oral analgesic alternatives have been inadequate for pain control and in my professional opinion, controlled substances are the only reasonable choice for this patient because her pain was note adequately controlled with alternatives  and she has chronic progressive disease. Accordingly, I have discussed the risk and benefits, treatment plan, and alternative therapies with the patient.       Follow up: 2 months    Please note that this dictation was created using voice recognition software. I have made every reasonable attempt to correct obvious errors but there may be errors of grammar and content that I may have overlooked prior to finalization of this note.      Jayy Kerr MD  Interventional Spine and Sports Physiatry  Physical Medicine and Rehabilitation  [facility]      PCP: [facility]

## 2023-05-03 ENCOUNTER — OFFICE VISIT (OUTPATIENT)
Dept: PHYSICAL MEDICINE AND REHAB | Facility: MEDICAL CENTER | Age: 53
End: 2023-05-03
Payer: MEDICAID

## 2023-05-03 VITALS
SYSTOLIC BLOOD PRESSURE: 90 MMHG | DIASTOLIC BLOOD PRESSURE: 58 MMHG | WEIGHT: 98.2 LBS | HEART RATE: 93 BPM | HEIGHT: 63 IN | BODY MASS INDEX: 17.4 KG/M2 | OXYGEN SATURATION: 96 % | TEMPERATURE: 97.2 F

## 2023-05-03 DIAGNOSIS — G89.4 CHRONIC PAIN SYNDROME: ICD-10-CM

## 2023-05-03 DIAGNOSIS — F33.2 SEVERE EPISODE OF RECURRENT MAJOR DEPRESSIVE DISORDER, WITHOUT PSYCHOTIC FEATURES (HCC): ICD-10-CM

## 2023-05-03 DIAGNOSIS — M05.79 RHEUMATOID ARTHRITIS INVOLVING MULTIPLE SITES WITH POSITIVE RHEUMATOID FACTOR (HCC): ICD-10-CM

## 2023-05-03 DIAGNOSIS — M06.9 RHEUMATOID ARTHRITIS INVOLVING BOTH HANDS, UNSPECIFIED WHETHER RHEUMATOID FACTOR PRESENT (HCC): ICD-10-CM

## 2023-05-03 DIAGNOSIS — N20.0 NEPHROLITHIASIS: ICD-10-CM

## 2023-05-03 PROCEDURE — 99214 OFFICE O/P EST MOD 30 MIN: CPT | Performed by: PHYSICAL MEDICINE & REHABILITATION

## 2023-05-03 RX ORDER — OXYCODONE AND ACETAMINOPHEN 10; 325 MG/1; MG/1
1 TABLET ORAL EVERY 4 HOURS PRN
Qty: 35 TABLET | Refills: 0 | Status: SHIPPED | OUTPATIENT
Start: 2023-05-25 | End: 2023-05-25

## 2023-05-03 RX ORDER — OXYCODONE AND ACETAMINOPHEN 10; 325 MG/1; MG/1
1 TABLET ORAL EVERY 4 HOURS PRN
Qty: 35 TABLET | Refills: 0 | Status: ON HOLD | OUTPATIENT
Start: 2023-05-18 | End: 2023-06-13

## 2023-05-03 RX ORDER — OXYCODONE AND ACETAMINOPHEN 10; 325 MG/1; MG/1
1 TABLET ORAL EVERY 4 HOURS PRN
Qty: 35 TABLET | Refills: 0 | Status: SHIPPED | OUTPATIENT
Start: 2023-05-11 | End: 2023-05-18

## 2023-05-03 RX ORDER — OXYCODONE AND ACETAMINOPHEN 10; 325 MG/1; MG/1
1 TABLET ORAL EVERY 4 HOURS PRN
Qty: 35 TABLET | Refills: 0 | Status: SHIPPED | OUTPATIENT
Start: 2023-05-04 | End: 2023-05-11

## 2023-05-03 ASSESSMENT — PATIENT HEALTH QUESTIONNAIRE - PHQ9
SUM OF ALL RESPONSES TO PHQ QUESTIONS 1-9: 24
CLINICAL INTERPRETATION OF PHQ2 SCORE: 6
5. POOR APPETITE OR OVEREATING: 3 - NEARLY EVERY DAY

## 2023-05-03 ASSESSMENT — FIBROSIS 4 INDEX: FIB4 SCORE: 0.34

## 2023-05-03 ASSESSMENT — PAIN SCALES - GENERAL: PAINLEVEL: 10=SEVERE PAIN

## 2023-05-03 NOTE — PROGRESS NOTES
Follow up patient note  Pain Medicine, Interventional spine and sports physiatry, Physical medicine rehabilitation    Date of Service: 05/03/2023    Chief complaint:   Joint pain and neck pain      HISTORY    Please see new patient note dated 06/08/2018 by Dr Kerr,  for more details.     HPI  Patient identification: Mary Martinez 53 y.o. female with RA involving multiple joints, atlantoaxial instability returns for follow-up of her pain related to rheumatoid arthritis.      Interval history:     Mary has been having more trouble with stomach pain and is not able to eat much in the last few days.  Her bowel movements are not regular.  She is losing weight again.  She cannot see GI until June and has a conflict with current Urology visit May 18 as it is the same day as her son's graduation from college.    Pain is manageable with percocet 10/325, now taking closer to 5/day on average.  This was the dose she was on prior to insurance issues last fall.  Pain is 10/10 on the NRS.  She continues to have ongoing pain complaints in multiple joints at baseline.  Her back pain is better when she lays on her left side.    Reports that she has been having more joint issues in her hands with swelling without erythema or warmth.  Left elbow with larger nodule as well.    Dr. Dela Cruz is her Rheumatologist.    Reports that she works with Mckenzie at [facility] and that she will have a new PCP as Dr. Christopher left [facility].  She has a new PCP there that she will not see until April 2023.    Works with [facility] for behavioral health.  Duloxetine caused side effects and she has resumed paxil.    PHQ-9: 24 denies suicidal plan, but reports that current pain is significantly challenging, continues to work with her psychology and psychiatry team  ORT: 4      Records review:  Hospitalized for severe sepsis due to recurrent urinary tract infection.  Reviewed discharged from Dr. Mann  02/17/2023-02/19/2023    ROS  See HPI    Red Flags :   Fever, Chills, Sweats: Denies  Involuntary Weight Loss: Denies  Bowel/Bladder Incontinence: Denies      PMHx:   Past Medical History:   Diagnosis Date    Acute renal failure (ARF) (HCC)     Allergy     Anemia     Anxiety     Arthritis     Rheumatoid -follows with rheumatologist. Has several fractures due to RA.    ASTHMA     inhalers prn    Blood transfusion without reported diagnosis     Bowel habit changes     4/24/20-constipation and diarrhea.    Bronchitis last approx 2018    Chronic pain 04/24/2020    Due to RA    Dental disorder     no teeth upper-does not wear her denture. Broken teeth on bottom.    Depression     GERD (gastroesophageal reflux disease)     GIB (gastrointestinal bleeding)     Head ache     Heart burn     taking famotidine    Hiatus hernia syndrome     History of pancreatitis     Hypokalemia     Hyponatremia     Idiopathic chronic pancreatitis (HCC)     Indigestion     taking famotidine    Intractable nausea and vomiting     Kidney disease     Pain     CPS-everywhere    Pneumonia 10/2019    PONV (postoperative nausea and vomiting)     Psychiatric problem     anxiety and depression    Rheumatoid aortitis     Rheumatoid arthritis(714.0) 2003    SMAS (superior mesenteric artery syndrome) ([facility]ly [facility])     Substance abuse ([facility]ly [facility])        PSHx:   Past Surgical History:   Procedure Laterality Date    ID UPPER GI ENDOSCOPY,DIAGNOSIS N/A 9/9/2022    Procedure: ENDOSCOPIC ULTRASOUND- UPPER;  Surgeon: Jorge Brooke M.D.;  Location: [facility];  Service: EUS    EGD W/ENDOSCOPIC ULTRASOUND N/A 9/9/2022    Procedure: EGD, WITH ENDOSCOPIC US;  Surgeon: Jorge Brooke M.D.;  Location: [facility];  Service: EUS    ID ENDOSCOPIC US EXAM, ESOPH  4/29/2020    Procedure: EGD, WITH ENDOSCOPIC US;  Surgeon: Jorge Brooke M.D.;  Location: [facility];  Service: Gastroenterology    GASTROSCOPY-ENDO  4/29/2020     Procedure: GASTROSCOPY;  Surgeon: Jorge Brooke M.D.;  Location: [facility];  Service: Gastroenterology    EGD WITH ASP/BX  4/29/2020    Procedure: EGD, WITH ASPIRATION BIOPSY - POSS FNA;  Surgeon: Jorge Brooke M.D.;  Location: [facility];  Service: Gastroenterology    IL EXPLORATORY OF ABDOMEN N/A 10/4/2019    Procedure: LAPAROTOMY, EXPLORATORY AND REPAIR OF DUODENAL PERFORATION;  Surgeon: James Dumont M.D.;  Location: [facility];  Service: General    COLONOSCOPY  2018    with upper endoscopy    FINGER ARTHROPLASTY Right 6/5/2017    Procedure: FINGER ARTHROPLASTY - LONG, RING AND SMALL VOLAR PLATE;  Surgeon: Lobo Rosen M.D.;  Location: SURGERY SAME DAY [facility];  Service:     FINGER AMPUTATION  6/5/2017    Procedure: FINGER AMPUTATION - LONG, RING AND SMALL AT THE PROXIMAL INTERPHALANGEAL JOINT;  Surgeon: Lobo Rosen M.D.;  Location: SURGERY SAME DAY [facility];  Service:     FINGER ARTHROPLASTY Right 4/17/2017    Procedure: FINGER ARTHROPLASTY ;  Surgeon: Lobo Rosen M.D.;  Location: SURGERY SAME DAY [facility];  Service:     FINGER AMPUTATION Right 9/14/2016    Procedure: FINGER AMPUTATION INDEX;  Surgeon: Lobo Rosen M.D.;  Location: SURGERY SAME DAY [facility];  Service:     IRRIGATION & DEBRIDEMENT ORTHO Right 9/11/2016    Procedure: IRRIGATION & DEBRIDEMENT ORTHO - right index finger;  Surgeon: Madhu Rosen M.D.;  Location: [facility];  Service:     FUSION, PIP JOINT, TOE Right 8/29/2016    Procedure: RE-DO INDEX FINGER PROXIMAL INTERPHALANGEAL ARTHRODESIS;  Surgeon: Lobo Rosen M.D.;  Location: SURGERY SAME DAY [facility];  Service:     BONE GRAFT Right 8/29/2016    Procedure: BONE GRAFT - DISTAL RADIUS ;  Surgeon: Lobo Rosen M.D.;  Location: SURGERY SAME DAY [facility];  Service:     ARTHRODESIS Right 5/6/2016    Procedure: ARTHRODESIS INDEX FINGER  PROXIMAL INTERPHALANGEAL;  Surgeon: Lobo Rosen M.D.;  Location: SURGERY SAME DAY [facility];  Service:     FOOT RECONSTRUCTION RHEUMATIC Left 7/29/2015    Procedure: FOOT RECONSTRUCTION RHEUMATIC;  Surgeon: Heriberto Alves M.D.;  Location: SURGERY [facility];  Service:     TOE FUSION Right 5/27/2015    Procedure: TOE FUSION 1ST METATARSALPHALANGEAL JOINT;  Surgeon: Heriberto Alves M.D.;  Location: SURGERY [facility];  Service:     BONE SPUR EXCISION  5/27/2015    Procedure: BONE SPUR EXCISION METATARSAL HEAD 2-3;  Surgeon: Heriberto Alves M.D.;  Location: SURGERY [facility];  Service:     HAMMERTOE CORRECTION  5/27/2015    Procedure: HAMMERTOE CORRECTION AND SOFT TISSUE RE-ALINGMENT 2-3 ;  Surgeon: Heriberto Alves M.D.;  Location: SURGERY [facility];  Service:     DENTAL EXTRACTION(S)  2014    all of upper teeth    ABDOMINAL ABSCESS DRAINAGE  9/27/2011    Performed by VERO WRIGHT at [facility]    EMANUEL BY LAPAROSCOPY  9/27/2011    Performed by VERO WRIGHT at [facility]    APPENDECTOMY  2011    Found out it had ruptured prior to abcess surgery    REPEAT C SECTION  2008    REPEAT C SECTION  2005    REPEAT C SECTION  1999    PRIMARY C SECTION  1991    TONSILLECTOMY  1982    WRIST EXPLORATION Left 1980's    fractured wrist-no hardware       Family history   Family History   Problem Relation Age of Onset    Cancer Mother     Heart Disease Mother     Hypertension Mother     Heart Disease Father     Hypertension Father          Medications:   Outpatient Medications Marked as Taking for the 5/3/23 encounter (Office Visit) with Jayy Kerr M.D.   Medication Sig Dispense Refill    [START ON 5/4/2023] oxyCODONE-acetaminophen (PERCOCET-10)  MG Tab Take 1 Tablet by mouth every four hours as needed for Severe Pain for up to 7 days. 35 Tablet 0    [START ON 5/11/2023] oxyCODONE-acetaminophen (PERCOCET-10)  MG Tab Take 1 Tablet by mouth every  four hours as needed for Severe Pain for up to 7 days. 35 Tablet 0    [START ON 5/18/2023] oxyCODONE-acetaminophen (PERCOCET-10)  MG Tab Take 1 Tablet by mouth every four hours as needed for Severe Pain for up to 7 days. 35 Tablet 0    [START ON 5/25/2023] oxyCODONE-acetaminophen (PERCOCET-10)  MG Tab Take 1 Tablet by mouth every four hours as needed for Severe Pain for up to 7 days. 35 Tablet 0       Allergies:   Allergies   Allergen Reactions    Penicillins Anaphylaxis and Hives     Tolerates cephalosporins; reports throat swelling with PCN    Abilify Unspecified     Multiple side effects    Aripiprazole Nausea     Spasms, shaking      Nitrous Oxide Vomiting    Citrus Rash and Itching     Only on Skin Contact       Social Hx:   Social History     Socioeconomic History    Marital status:      Spouse name: Ousmane    Number of children: 5    Years of education: Not on file    Highest education level: Not on file   Occupational History    Not on file   Tobacco Use    Smoking status: Every Day     Packs/day: 1.00     Years: 30.00     Pack years: 30.00     Types: Cigarettes    Smokeless tobacco: Never   Vaping Use    Vaping Use: Never used   Substance and Sexual Activity    Alcohol use: Never    Drug use: Never    Sexual activity: Not Currently   Other Topics Concern     Service No    Blood Transfusions Yes    Caffeine Concern No    Occupational Exposure No    Hobby Hazards No    Sleep Concern No    Stress Concern Yes    Weight Concern No    Special Diet No    Back Care No    Exercise Yes    Bike Helmet Yes    Seat Belt Yes    Self-Exams Yes   Social History Narrative    ** Merged History Encounter **          Social Determinants of Health     Financial Resource Strain: Low Risk     Difficulty of Paying Living Expenses: Not hard at all   Food Insecurity: No Food Insecurity    Worried About Running Out of Food in the Last Year: Never true    Ran Out of Food in the Last Year: Never true  "  Transportation Needs: No Transportation Needs    Lack of Transportation (Medical): No    Lack of Transportation (Non-Medical): No   Physical Activity: Not on file   Stress: Not on file   Social Connections: Not on file   Intimate Partner Violence: Not on file   Housing Stability: Not on file         EXAMINATION     Physical Exam:   Vitals: BP 90/58 (BP Location: Right arm, Patient Position: Sitting, BP Cuff Size: Adult)   Pulse 93   Temp 36.2 °C (97.2 °F) (Temporal)   Ht 1.6 m (5' 3\")   Wt 44.5 kg (98 lb 3.2 oz)   SpO2 96%     Constitutional:   Body Habitus: Body mass index is 17.4 kg/m².  Cooperation: Fully cooperates with exam  Appearance: Appears pale, thin.    Respiratory-  breathing comfortable on room air, no audible wheezing  Cardiovascular- no lower extremity edema is observed.     Psychiatric- alert and oriented ×3. Normal affect.     Musculoskeletal:    Partial hand amputation on the right at the MCPs; ulnar deviation on the left with MCP subluxation, mild atrophy of the hand intrinsics with wasting of the thenar eminence. No warmth or erythema over left MCP there is a soft area of swelling    Gait is steady without assist device.  Mildly antalgic without loss of balance, walking with mildly increased hip flexion    No focal motor deficits in her lower extremities bilaterally    MEDICAL DECISION MAKING    DATA    Labs: UDS 10/30/2018 consistent with medications.  Oxycodone and metabolites without other substances   UDS 04/19/2019 consistent with medications. Oxycodone and metabolites without other substances   UDS 07/19/2019 consistent with medications.  Oxycodone and metabolites without other substances   UDS 11/22/2019 consistent with medications.  Oxycodone and metabolites without other substances   UDS 02/20/2020 absent for Oxycodone and metabolites without other substances  UDS 06/10/2020 positive for oxycodone and metabolites without other substances  UDS 08/18/2020 positive for oxycodone and " metabolites, positive for EtG without EtS  UDS 09/07/2021 negative for oxycodone and metabolites, patient was out of medication, no other abnormalities  UDS 05/18/2022: positive for oxycodone and metabolites.    UDS 02/24/2023: positive for noroxycodone, negative for other metabolites.  No other substances    Imaging:    Films reviewed.  These are my reads:    Xray right shoulder 08/20/2020  There is note of severe degenerative change of the glenohumeral joint.  No fracture.  Erosive changes, consistent with known history of RA    MRI cervical spine 07/31/2018:    There is is note of motion at the atlantodental level with 5mm atlantodental inverval in flexion that reduces to 1-2 mm in extension.  Mild retrolisthesis of C4 on C5 and anterolisthesis of C5- on C6.  Disc extrusion at C6-7 with mild central canal stenosis    Xray left shoulder 2/5/2018 Humeral head is elevated.  Glenohumeral joint arthritis.    Reports reviewed:    Xray right shoulder 08/20/2020 [facility]  Impression  Extensive erosive changes of the humeral head and degenerative changes of the glenohumeral joint.  Findings are concerning for inflammatory arthropathy such as rheumatoid arthritis.      Xray cervical spine 11/14/2018  1. No acute fracture  2. Persistent motion at the C1-2 joint as before, suggesting atlantoaxial instability  3. Minimal instability noted at C5-6 level as described.    MRI cervical spine 07/31/2018  1. Widening of the atlantodental interal in neutral and flexion positions with a 5mm space which reduces to 1-2 mm in extension  2. 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 Rheumatoid arthritis involving both hands, unspecified whether rheumatoid factor present ([facility])  M06.9   2. Rheumatoid arthritis  involving multiple sites with positive rheumatoid factor (HCC)  M05.79   3. Chronic pain syndrome  G89.4   4. Nephrolithiasis  N20.0   5. Severe episode of recurrent major depressive disorder, without psychotic features (HCC)  F33.2                     ASSESSMENT and PLAN:     Mary Martinez 53 y.o. female with rheumatoid arthritis    Mary was seen today for follow-up.    Orders and management for this visit:    Orders Placed This Encounter    Patient has been identified as having a positive depression screening. Appropriate orders and counseling have been given.    oxyCODONE-acetaminophen (PERCOCET-10)  MG Tab    oxyCODONE-acetaminophen (PERCOCET-10)  MG Tab    oxyCODONE-acetaminophen (PERCOCET-10)  MG Tab    oxyCODONE-acetaminophen (PERCOCET-10)  MG Tab     Discussed that she has not been able to follow-up with GI, Urology or her PCP.  Encouraged her to make contact and attempt to get on cancellation list.  She reports that she is on one.  Encouraged her to follow-up with Dr. Dela Cruz and see if she can see him sooner for progression of nodules.  No clear infection at this time, but if this changes, she will see urgent care.  Continue to eat as tolerated and work at maintaining bowel movements with OTC medications, but her intake has been significantly reduced.  Taking omeprazole is somewhat helpful for GI symptoms, but diet is decreased.  Continue use of aspercreme over left greater trochanter.  She declines injection.   reviewed.  Most recent UDS reviewed and consistent.  Consents obtained.  Continue percocet 10/325 up to 5/day.  Scripts given for the next 28 days in 7 day increments.  This is the chronic dose that she had been on prior to losing her insurance last fall.  Continue with behavioral health.    In prescribing controlled substances to this patient, I certify that I have obtained and reviewed the medical history of Mary Martinez. I have also made a good aristides  effort to obtain applicable records from other providers who have treated the patient and records did not demonstrate any increased risk of substance abuse that would prevent me from prescribing controlled substances.     I have conducted a physical exam and documented it. I have reviewed Ms. Martinez’s prescription history as maintained by the [place] Prescription Monitoring Program.   ORT 4, indicates moderate risk    I have assessed the patient’s risk for abuse, dependency, and addiction using the validated Opioid Risk Tool available at https://www.mdcalc.com/qijphq-wlbb-bcqh-ort-narcotic-abuse.     Given the above, I believe the benefits of controlled substance therapy outweigh the risks. The reasons for prescribing controlled substances include non-narcotic, oral analgesic alternatives have been inadequate for pain control and in my professional opinion, controlled substances are the only reasonable choice for this patient because her pain was note adequately controlled with alternatives  and she has chronic progressive disease. Accordingly, I have discussed the risk and benefits, treatment plan, and alternative therapies with the patient.       Follow up: 4 weeks    Please note that this dictation was created using voice recognition software. I have made every reasonable attempt to correct obvious errors but there may be errors of grammar and content that I may have overlooked prior to finalization of this note.      Jayy Kerr MD  Interventional Spine and Sports Physiatry  Physical Medicine and Rehabilitation  [facility]      PCP: [facility]

## 2023-05-25 ENCOUNTER — HOSPITAL ENCOUNTER (INPATIENT)
Facility: MEDICAL CENTER | Age: 53
LOS: 19 days | DRG: 682 | End: 2023-06-13
Attending: EMERGENCY MEDICINE | Admitting: INTERNAL MEDICINE
Payer: MEDICAID

## 2023-05-25 DIAGNOSIS — D75.839 THROMBOCYTOSIS: ICD-10-CM

## 2023-05-25 DIAGNOSIS — E43 SEVERE MALNUTRITION (HCC): ICD-10-CM

## 2023-05-25 DIAGNOSIS — N39.0 URINARY TRACT INFECTION WITHOUT HEMATURIA, SITE UNSPECIFIED: ICD-10-CM

## 2023-05-25 DIAGNOSIS — B37.0 THRUSH: ICD-10-CM

## 2023-05-25 DIAGNOSIS — M05.79 RHEUMATOID ARTHRITIS INVOLVING MULTIPLE SITES WITH POSITIVE RHEUMATOID FACTOR (HCC): ICD-10-CM

## 2023-05-25 DIAGNOSIS — R62.7 FAILURE TO THRIVE IN ADULT: ICD-10-CM

## 2023-05-25 DIAGNOSIS — E87.20 METABOLIC ACIDOSIS: ICD-10-CM

## 2023-05-25 DIAGNOSIS — R65.21 SEPTIC SHOCK (HCC): ICD-10-CM

## 2023-05-25 DIAGNOSIS — J96.01 ACUTE HYPOXEMIC RESPIRATORY FAILURE (HCC): ICD-10-CM

## 2023-05-25 DIAGNOSIS — N20.0 NEPHROLITHIASIS: ICD-10-CM

## 2023-05-25 DIAGNOSIS — E87.6 HYPOKALEMIA: ICD-10-CM

## 2023-05-25 DIAGNOSIS — E87.29 STARVATION KETOACIDOSIS: ICD-10-CM

## 2023-05-25 DIAGNOSIS — G89.4 CHRONIC PAIN SYNDROME: ICD-10-CM

## 2023-05-25 DIAGNOSIS — T73.0XXA STARVATION KETOACIDOSIS: ICD-10-CM

## 2023-05-25 DIAGNOSIS — E86.0 DEHYDRATION: ICD-10-CM

## 2023-05-25 DIAGNOSIS — A41.9 SEPTIC SHOCK (HCC): ICD-10-CM

## 2023-05-25 PROBLEM — E83.51 HYPOCALCEMIA: Status: ACTIVE | Noted: 2023-05-25

## 2023-05-25 LAB
ALBUMIN SERPL BCP-MCNC: 2.6 G/DL (ref 3.2–4.9)
ALBUMIN SERPL BCP-MCNC: 3 G/DL (ref 3.2–4.9)
ALBUMIN/GLOB SERPL: 0.9 G/DL
ALP SERPL-CCNC: 195 U/L (ref 30–99)
ALT SERPL-CCNC: 9 U/L (ref 2–50)
ANION GAP SERPL CALC-SCNC: 20 MMOL/L (ref 7–16)
APPEARANCE UR: ABNORMAL
AST SERPL-CCNC: 6 U/L (ref 12–45)
B-OH-BUTYR SERPL-MCNC: 0.74 MMOL/L (ref 0.02–0.27)
BACTERIA #/AREA URNS HPF: ABNORMAL /HPF
BASOPHILS # BLD AUTO: 0.5 % (ref 0–1.8)
BASOPHILS # BLD: 0.04 K/UL (ref 0–0.12)
BILIRUB SERPL-MCNC: <0.2 MG/DL (ref 0.1–1.5)
BILIRUB UR QL STRIP.AUTO: NEGATIVE
BUN SERPL-MCNC: 32 MG/DL (ref 8–22)
BUN SERPL-MCNC: 35 MG/DL (ref 8–22)
CALCIUM ALBUM COR SERPL-MCNC: 8.2 MG/DL (ref 8.5–10.5)
CALCIUM ALBUM COR SERPL-MCNC: 8.3 MG/DL (ref 8.5–10.5)
CALCIUM SERPL-MCNC: 7.2 MG/DL (ref 8.5–10.5)
CALCIUM SERPL-MCNC: 7.4 MG/DL (ref 8.5–10.5)
CHLORIDE SERPL-SCNC: 108 MMOL/L (ref 96–112)
CHLORIDE SERPL-SCNC: 109 MMOL/L (ref 96–112)
CO2 SERPL-SCNC: 5 MMOL/L (ref 20–33)
CO2 SERPL-SCNC: 5 MMOL/L (ref 20–33)
COLOR UR: YELLOW
CREAT SERPL-MCNC: 1.04 MG/DL (ref 0.5–1.4)
CREAT SERPL-MCNC: 1.18 MG/DL (ref 0.5–1.4)
EKG IMPRESSION: NORMAL
EOSINOPHIL # BLD AUTO: 0.03 K/UL (ref 0–0.51)
EOSINOPHIL NFR BLD: 0.4 % (ref 0–6.9)
EPI CELLS #/AREA URNS HPF: NEGATIVE /HPF
ERYTHROCYTE [DISTWIDTH] IN BLOOD BY AUTOMATED COUNT: 62.9 FL (ref 35.9–50)
GFR SERPLBLD CREATININE-BSD FMLA CKD-EPI: 56 ML/MIN/1.73 M 2
GFR SERPLBLD CREATININE-BSD FMLA CKD-EPI: 65 ML/MIN/1.73 M 2
GLOBULIN SER CALC-MCNC: 3.2 G/DL (ref 1.9–3.5)
GLUCOSE SERPL-MCNC: 102 MG/DL (ref 65–99)
GLUCOSE SERPL-MCNC: 104 MG/DL (ref 65–99)
GLUCOSE UR STRIP.AUTO-MCNC: NEGATIVE MG/DL
HCT VFR BLD AUTO: 29.2 % (ref 37–47)
HGB BLD-MCNC: 9.8 G/DL (ref 12–16)
HYALINE CASTS #/AREA URNS LPF: ABNORMAL /LPF
IMM GRANULOCYTES # BLD AUTO: 0.08 K/UL (ref 0–0.11)
IMM GRANULOCYTES NFR BLD AUTO: 1.1 % (ref 0–0.9)
KETONES UR STRIP.AUTO-MCNC: NEGATIVE MG/DL
LACTATE SERPL-SCNC: 0.9 MMOL/L (ref 0.5–2)
LACTATE SERPL-SCNC: 1 MMOL/L (ref 0.5–2)
LACTATE SERPL-SCNC: 1.2 MMOL/L (ref 0.5–2)
LEUKOCYTE ESTERASE UR QL STRIP.AUTO: ABNORMAL
LYMPHOCYTES # BLD AUTO: 0.85 K/UL (ref 1–4.8)
LYMPHOCYTES NFR BLD: 11.3 % (ref 22–41)
MAGNESIUM SERPL-MCNC: 2.2 MG/DL (ref 1.5–2.5)
MCH RBC QN AUTO: 29.3 PG (ref 27–33)
MCHC RBC AUTO-ENTMCNC: 33.6 G/DL (ref 32.2–35.5)
MCV RBC AUTO: 87.2 FL (ref 81.4–97.8)
MICRO URNS: ABNORMAL
MONOCYTES # BLD AUTO: 0.74 K/UL (ref 0–0.85)
MONOCYTES NFR BLD AUTO: 9.9 % (ref 0–13.4)
NEUTROPHILS # BLD AUTO: 5.77 K/UL (ref 1.82–7.42)
NEUTROPHILS NFR BLD: 76.8 % (ref 44–72)
NITRITE UR QL STRIP.AUTO: POSITIVE
NRBC # BLD AUTO: 0.03 K/UL
NRBC BLD-RTO: 0.4 /100 WBC (ref 0–0.2)
PH UR STRIP.AUTO: 6 [PH] (ref 5–8)
PHOSPHATE SERPL-MCNC: 2.8 MG/DL (ref 2.5–4.5)
PHOSPHATE SERPL-MCNC: 2.9 MG/DL (ref 2.5–4.5)
PLATELET # BLD AUTO: 354 K/UL (ref 164–446)
PMV BLD AUTO: 10.1 FL (ref 9–12.9)
POTASSIUM SERPL-SCNC: 2 MMOL/L (ref 3.6–5.5)
POTASSIUM SERPL-SCNC: 2.4 MMOL/L (ref 3.6–5.5)
PROT SERPL-MCNC: 6.2 G/DL (ref 6–8.2)
PROT UR QL STRIP: 100 MG/DL
RBC # BLD AUTO: 3.35 M/UL (ref 4.2–5.4)
RBC # URNS HPF: ABNORMAL /HPF
RBC UR QL AUTO: ABNORMAL
SODIUM SERPL-SCNC: 132 MMOL/L (ref 135–145)
SODIUM SERPL-SCNC: 133 MMOL/L (ref 135–145)
SP GR UR STRIP.AUTO: 1.01
UROBILINOGEN UR STRIP.AUTO-MCNC: 0.2 MG/DL
WBC # BLD AUTO: 7.5 K/UL (ref 4.8–10.8)
WBC #/AREA URNS HPF: ABNORMAL /HPF

## 2023-05-25 PROCEDURE — 85025 COMPLETE CBC W/AUTO DIFF WBC: CPT

## 2023-05-25 PROCEDURE — A9270 NON-COVERED ITEM OR SERVICE: HCPCS | Performed by: INTERNAL MEDICINE

## 2023-05-25 PROCEDURE — 700111 HCHG RX REV CODE 636 W/ 250 OVERRIDE (IP): Performed by: INTERNAL MEDICINE

## 2023-05-25 PROCEDURE — 80069 RENAL FUNCTION PANEL: CPT

## 2023-05-25 PROCEDURE — 96365 THER/PROPH/DIAG IV INF INIT: CPT

## 2023-05-25 PROCEDURE — 700111 HCHG RX REV CODE 636 W/ 250 OVERRIDE (IP): Mod: UD | Performed by: EMERGENCY MEDICINE

## 2023-05-25 PROCEDURE — 700102 HCHG RX REV CODE 250 W/ 637 OVERRIDE(OP): Performed by: INTERNAL MEDICINE

## 2023-05-25 PROCEDURE — 700101 HCHG RX REV CODE 250: Mod: UD | Performed by: EMERGENCY MEDICINE

## 2023-05-25 PROCEDURE — A9270 NON-COVERED ITEM OR SERVICE: HCPCS | Performed by: EMERGENCY MEDICINE

## 2023-05-25 PROCEDURE — 87186 SC STD MICRODIL/AGAR DIL: CPT

## 2023-05-25 PROCEDURE — 770020 HCHG ROOM/CARE - TELE (206)

## 2023-05-25 PROCEDURE — 80053 COMPREHEN METABOLIC PANEL: CPT

## 2023-05-25 PROCEDURE — 99253 IP/OBS CNSLTJ NEW/EST LOW 45: CPT | Performed by: SURGERY

## 2023-05-25 PROCEDURE — 700105 HCHG RX REV CODE 258: Performed by: HOSPITALIST

## 2023-05-25 PROCEDURE — 93005 ELECTROCARDIOGRAM TRACING: CPT

## 2023-05-25 PROCEDURE — 87077 CULTURE AEROBIC IDENTIFY: CPT

## 2023-05-25 PROCEDURE — 83605 ASSAY OF LACTIC ACID: CPT | Mod: 91

## 2023-05-25 PROCEDURE — 99285 EMERGENCY DEPT VISIT HI MDM: CPT

## 2023-05-25 PROCEDURE — 84100 ASSAY OF PHOSPHORUS: CPT

## 2023-05-25 PROCEDURE — 81001 URINALYSIS AUTO W/SCOPE: CPT

## 2023-05-25 PROCEDURE — 700111 HCHG RX REV CODE 636 W/ 250 OVERRIDE (IP): Performed by: HOSPITALIST

## 2023-05-25 PROCEDURE — 96366 THER/PROPH/DIAG IV INF ADDON: CPT

## 2023-05-25 PROCEDURE — 87086 URINE CULTURE/COLONY COUNT: CPT

## 2023-05-25 PROCEDURE — 83735 ASSAY OF MAGNESIUM: CPT

## 2023-05-25 PROCEDURE — 36415 COLL VENOUS BLD VENIPUNCTURE: CPT

## 2023-05-25 PROCEDURE — 94760 N-INVAS EAR/PLS OXIMETRY 1: CPT

## 2023-05-25 PROCEDURE — 99223 1ST HOSP IP/OBS HIGH 75: CPT | Performed by: INTERNAL MEDICINE

## 2023-05-25 PROCEDURE — 96375 TX/PRO/DX INJ NEW DRUG ADDON: CPT

## 2023-05-25 PROCEDURE — 82010 KETONE BODYS QUAN: CPT

## 2023-05-25 PROCEDURE — 700105 HCHG RX REV CODE 258: Mod: UD | Performed by: EMERGENCY MEDICINE

## 2023-05-25 PROCEDURE — 700102 HCHG RX REV CODE 250 W/ 637 OVERRIDE(OP): Performed by: EMERGENCY MEDICINE

## 2023-05-25 RX ORDER — AMOXICILLIN 250 MG
2 CAPSULE ORAL 2 TIMES DAILY
Status: DISCONTINUED | OUTPATIENT
Start: 2023-05-25 | End: 2023-05-26

## 2023-05-25 RX ORDER — SODIUM CHLORIDE, SODIUM LACTATE, POTASSIUM CHLORIDE, CALCIUM CHLORIDE 600; 310; 30; 20 MG/100ML; MG/100ML; MG/100ML; MG/100ML
1000 INJECTION, SOLUTION INTRAVENOUS ONCE
Status: COMPLETED | OUTPATIENT
Start: 2023-05-25 | End: 2023-05-25

## 2023-05-25 RX ORDER — OXYCODONE HYDROCHLORIDE 5 MG/1
5 TABLET ORAL 3 TIMES DAILY
Status: DISCONTINUED | OUTPATIENT
Start: 2023-05-25 | End: 2023-05-25

## 2023-05-25 RX ORDER — CALCIUM CARBONATE 500 MG/1
500 TABLET, CHEWABLE ORAL 3 TIMES DAILY
Status: DISCONTINUED | OUTPATIENT
Start: 2023-05-25 | End: 2023-05-26

## 2023-05-25 RX ORDER — MIDODRINE HYDROCHLORIDE 5 MG/1
10 TABLET ORAL
Status: DISCONTINUED | OUTPATIENT
Start: 2023-05-25 | End: 2023-05-25

## 2023-05-25 RX ORDER — DULOXETIN HYDROCHLORIDE 30 MG/1
30 CAPSULE, DELAYED RELEASE ORAL DAILY
Status: DISCONTINUED | OUTPATIENT
Start: 2023-05-26 | End: 2023-05-25

## 2023-05-25 RX ORDER — PROMETHAZINE HYDROCHLORIDE 25 MG/1
12.5-25 TABLET ORAL EVERY 4 HOURS PRN
Status: DISCONTINUED | OUTPATIENT
Start: 2023-05-25 | End: 2023-05-26

## 2023-05-25 RX ORDER — HYDROMORPHONE HYDROCHLORIDE 1 MG/ML
0.25 INJECTION, SOLUTION INTRAMUSCULAR; INTRAVENOUS; SUBCUTANEOUS
Status: DISCONTINUED | OUTPATIENT
Start: 2023-05-25 | End: 2023-05-26

## 2023-05-25 RX ORDER — FOLIC ACID 1 MG/1
1 TABLET ORAL 2 TIMES DAILY
Status: DISCONTINUED | OUTPATIENT
Start: 2023-05-25 | End: 2023-05-25

## 2023-05-25 RX ORDER — ONDANSETRON 2 MG/ML
4 INJECTION INTRAMUSCULAR; INTRAVENOUS ONCE
Status: COMPLETED | OUTPATIENT
Start: 2023-05-25 | End: 2023-05-25

## 2023-05-25 RX ORDER — OMEPRAZOLE 20 MG/1
20 CAPSULE, DELAYED RELEASE ORAL EVERY EVENING
Status: DISCONTINUED | OUTPATIENT
Start: 2023-05-25 | End: 2023-05-26

## 2023-05-25 RX ORDER — POTASSIUM CHLORIDE 20 MEQ/1
40 TABLET, EXTENDED RELEASE ORAL ONCE
Status: COMPLETED | OUTPATIENT
Start: 2023-05-25 | End: 2023-05-25

## 2023-05-25 RX ORDER — ENOXAPARIN SODIUM 100 MG/ML
40 INJECTION SUBCUTANEOUS DAILY
Status: DISCONTINUED | OUTPATIENT
Start: 2023-05-25 | End: 2023-06-13 | Stop reason: HOSPADM

## 2023-05-25 RX ORDER — PROMETHAZINE HYDROCHLORIDE 25 MG/1
12.5-25 SUPPOSITORY RECTAL EVERY 4 HOURS PRN
Status: DISCONTINUED | OUTPATIENT
Start: 2023-05-25 | End: 2023-06-13 | Stop reason: HOSPADM

## 2023-05-25 RX ORDER — OXYCODONE HYDROCHLORIDE 5 MG/1
5 TABLET ORAL
Status: DISCONTINUED | OUTPATIENT
Start: 2023-05-25 | End: 2023-05-26

## 2023-05-25 RX ORDER — ONDANSETRON 4 MG/1
4 TABLET, ORALLY DISINTEGRATING ORAL EVERY 4 HOURS PRN
Status: DISCONTINUED | OUTPATIENT
Start: 2023-05-25 | End: 2023-05-26

## 2023-05-25 RX ORDER — OXYCODONE HYDROCHLORIDE 5 MG/1
5 TABLET ORAL
Status: DISCONTINUED | OUTPATIENT
Start: 2023-05-25 | End: 2023-05-25

## 2023-05-25 RX ORDER — PROCHLORPERAZINE EDISYLATE 5 MG/ML
5-10 INJECTION INTRAMUSCULAR; INTRAVENOUS EVERY 4 HOURS PRN
Status: DISCONTINUED | OUTPATIENT
Start: 2023-05-25 | End: 2023-06-13 | Stop reason: HOSPADM

## 2023-05-25 RX ORDER — SUCRALFATE 1 G/1
1 TABLET ORAL
Status: DISCONTINUED | OUTPATIENT
Start: 2023-05-25 | End: 2023-05-25

## 2023-05-25 RX ORDER — OXYCODONE HYDROCHLORIDE 10 MG/1
10 TABLET ORAL
Status: DISCONTINUED | OUTPATIENT
Start: 2023-05-25 | End: 2023-05-26

## 2023-05-25 RX ORDER — ONDANSETRON 2 MG/ML
4 INJECTION INTRAMUSCULAR; INTRAVENOUS EVERY 4 HOURS PRN
Status: DISCONTINUED | OUTPATIENT
Start: 2023-05-25 | End: 2023-06-13 | Stop reason: HOSPADM

## 2023-05-25 RX ORDER — HYDROMORPHONE HYDROCHLORIDE 1 MG/ML
1 INJECTION, SOLUTION INTRAMUSCULAR; INTRAVENOUS; SUBCUTANEOUS ONCE
Status: COMPLETED | OUTPATIENT
Start: 2023-05-25 | End: 2023-05-25

## 2023-05-25 RX ORDER — LABETALOL HYDROCHLORIDE 5 MG/ML
10 INJECTION, SOLUTION INTRAVENOUS EVERY 4 HOURS PRN
Status: DISCONTINUED | OUTPATIENT
Start: 2023-05-25 | End: 2023-06-13 | Stop reason: HOSPADM

## 2023-05-25 RX ORDER — POLYETHYLENE GLYCOL 3350 17 G/17G
1 POWDER, FOR SOLUTION ORAL
Status: DISCONTINUED | OUTPATIENT
Start: 2023-05-25 | End: 2023-05-26

## 2023-05-25 RX ORDER — CHOLECALCIFEROL (VITAMIN D3) 125 MCG
1000 CAPSULE ORAL
Status: DISCONTINUED | OUTPATIENT
Start: 2023-05-26 | End: 2023-05-25

## 2023-05-25 RX ORDER — OXYCODONE HYDROCHLORIDE 5 MG/1
2.5 TABLET ORAL
Status: DISCONTINUED | OUTPATIENT
Start: 2023-05-25 | End: 2023-05-25

## 2023-05-25 RX ORDER — QUETIAPINE FUMARATE 25 MG/1
100 TABLET, FILM COATED ORAL EVERY EVENING
Status: DISCONTINUED | OUTPATIENT
Start: 2023-05-25 | End: 2023-05-26

## 2023-05-25 RX ORDER — SODIUM CHLORIDE AND POTASSIUM CHLORIDE 300; 900 MG/100ML; MG/100ML
INJECTION, SOLUTION INTRAVENOUS CONTINUOUS
Status: DISCONTINUED | OUTPATIENT
Start: 2023-05-25 | End: 2023-05-25

## 2023-05-25 RX ORDER — ACETAMINOPHEN 325 MG/1
650 TABLET ORAL EVERY 6 HOURS PRN
Status: DISCONTINUED | OUTPATIENT
Start: 2023-05-25 | End: 2023-05-26

## 2023-05-25 RX ORDER — PAROXETINE HYDROCHLORIDE 20 MG/1
60 TABLET, FILM COATED ORAL EVERY EVENING
Status: DISCONTINUED | OUTPATIENT
Start: 2023-05-25 | End: 2023-05-26

## 2023-05-25 RX ORDER — BISACODYL 10 MG
10 SUPPOSITORY, RECTAL RECTAL
Status: DISCONTINUED | OUTPATIENT
Start: 2023-05-25 | End: 2023-05-26

## 2023-05-25 RX ORDER — HYDROMORPHONE HYDROCHLORIDE 1 MG/ML
0.25 INJECTION, SOLUTION INTRAMUSCULAR; INTRAVENOUS; SUBCUTANEOUS
Status: DISCONTINUED | OUTPATIENT
Start: 2023-05-25 | End: 2023-05-25

## 2023-05-25 RX ADMIN — CALCIUM CARBONATE 500 MG: 500 TABLET, CHEWABLE ORAL at 18:00

## 2023-05-25 RX ADMIN — HYDROMORPHONE HYDROCHLORIDE 0.25 MG: 1 INJECTION, SOLUTION INTRAMUSCULAR; INTRAVENOUS; SUBCUTANEOUS at 19:36

## 2023-05-25 RX ADMIN — SODIUM CHLORIDE, POTASSIUM CHLORIDE, SODIUM LACTATE AND CALCIUM CHLORIDE 1000 ML: 600; 310; 30; 20 INJECTION, SOLUTION INTRAVENOUS at 12:01

## 2023-05-25 RX ADMIN — LIDOCAINE HYDROCHLORIDE 5 ML: 20 SOLUTION ORAL; TOPICAL at 13:19

## 2023-05-25 RX ADMIN — CEFTRIAXONE SODIUM 1000 MG: 10 INJECTION, POWDER, FOR SOLUTION INTRAVENOUS at 12:22

## 2023-05-25 RX ADMIN — ENOXAPARIN SODIUM 40 MG: 100 INJECTION SUBCUTANEOUS at 17:16

## 2023-05-25 RX ADMIN — ONDANSETRON 4 MG: 2 INJECTION INTRAMUSCULAR; INTRAVENOUS at 11:55

## 2023-05-25 RX ADMIN — NYSTATIN 500000 UNITS: 100000 SUSPENSION ORAL at 17:14

## 2023-05-25 RX ADMIN — HYDROMORPHONE HYDROCHLORIDE 1 MG: 1 INJECTION, SOLUTION INTRAMUSCULAR; INTRAVENOUS; SUBCUTANEOUS at 11:57

## 2023-05-25 RX ADMIN — NYSTATIN 500000 UNITS: 100000 SUSPENSION ORAL at 23:30

## 2023-05-25 RX ADMIN — QUETIAPINE FUMARATE 100 MG: 25 TABLET ORAL at 17:11

## 2023-05-25 RX ADMIN — PAROXETINE HYDROCHLORIDE 60 MG: 20 TABLET, FILM COATED ORAL at 17:13

## 2023-05-25 RX ADMIN — POTASSIUM CHLORIDE 40 MEQ: 1500 TABLET, EXTENDED RELEASE ORAL at 13:16

## 2023-05-25 RX ADMIN — OXYCODONE HYDROCHLORIDE 10 MG: 10 TABLET ORAL at 23:30

## 2023-05-25 RX ADMIN — NYSTATIN 500000 UNITS: 100000 SUSPENSION ORAL at 11:53

## 2023-05-25 RX ADMIN — ONDANSETRON 4 MG: 2 INJECTION INTRAMUSCULAR; INTRAVENOUS at 23:36

## 2023-05-25 RX ADMIN — CALCIUM CARBONATE 500 MG: 500 TABLET, CHEWABLE ORAL at 14:42

## 2023-05-25 RX ADMIN — POTASSIUM CHLORIDE AND SODIUM CHLORIDE: 900; 300 INJECTION, SOLUTION INTRAVENOUS at 13:22

## 2023-05-25 RX ADMIN — OMEPRAZOLE 20 MG: 20 CAPSULE, DELAYED RELEASE ORAL at 17:13

## 2023-05-25 RX ADMIN — POTASSIUM CHLORIDE: 149 INJECTION, SOLUTION, CONCENTRATE INTRAVENOUS at 18:19

## 2023-05-25 RX ADMIN — OXYCODONE HYDROCHLORIDE 10 MG: 10 TABLET ORAL at 18:18

## 2023-05-25 RX ADMIN — OXYCODONE HYDROCHLORIDE 10 MG: 10 TABLET ORAL at 14:42

## 2023-05-25 ASSESSMENT — COGNITIVE AND FUNCTIONAL STATUS - GENERAL
TURNING FROM BACK TO SIDE WHILE IN FLAT BAD: A LOT
CLIMB 3 TO 5 STEPS WITH RAILING: TOTAL
DRESSING REGULAR UPPER BODY CLOTHING: A LOT
PERSONAL GROOMING: A LOT
MOVING FROM LYING ON BACK TO SITTING ON SIDE OF FLAT BED: A LOT
DAILY ACTIVITIY SCORE: 13
SUGGESTED CMS G CODE MODIFIER DAILY ACTIVITY: CL
SUGGESTED CMS G CODE MODIFIER MOBILITY: CL
MOBILITY SCORE: 11
MOVING TO AND FROM BED TO CHAIR: A LOT
HELP NEEDED FOR BATHING: A LOT
STANDING UP FROM CHAIR USING ARMS: A LOT
WALKING IN HOSPITAL ROOM: A LOT
TOILETING: A LOT
EATING MEALS: A LITTLE
DRESSING REGULAR LOWER BODY CLOTHING: A LOT

## 2023-05-25 ASSESSMENT — FIBROSIS 4 INDEX
FIB4 SCORE: 0.29
FIB4 SCORE: 0.34

## 2023-05-25 ASSESSMENT — PATIENT HEALTH QUESTIONNAIRE - PHQ9
SUM OF ALL RESPONSES TO PHQ9 QUESTIONS 1 AND 2: 0
1. LITTLE INTEREST OR PLEASURE IN DOING THINGS: NOT AT ALL
2. FEELING DOWN, DEPRESSED, IRRITABLE, OR HOPELESS: NOT AT ALL

## 2023-05-25 ASSESSMENT — ENCOUNTER SYMPTOMS
FEVER: 0
COUGH: 0
DIARRHEA: 0
WEIGHT LOSS: 1
WEAKNESS: 1
DEPRESSION: 1
MYALGIAS: 0
BLURRED VISION: 0
CHILLS: 0
SORE THROAT: 1
NAUSEA: 0
DIZZINESS: 0
SHORTNESS OF BREATH: 0
ABDOMINAL PAIN: 1
HEADACHES: 0
VOMITING: 0
CONSTIPATION: 1
NERVOUS/ANXIOUS: 1

## 2023-05-25 ASSESSMENT — PAIN DESCRIPTION - PAIN TYPE
TYPE: ACUTE PAIN;CHRONIC PAIN
TYPE: ACUTE PAIN
TYPE: ACUTE PAIN;CHRONIC PAIN
TYPE: ACUTE PAIN

## 2023-05-25 ASSESSMENT — LIFESTYLE VARIABLES
TOTAL SCORE: 0
TOTAL SCORE: 0
ON A TYPICAL DAY WHEN YOU DRINK ALCOHOL HOW MANY DRINKS DO YOU HAVE: 0
HAVE YOU EVER FELT YOU SHOULD CUT DOWN ON YOUR DRINKING: NO
EVER HAD A DRINK FIRST THING IN THE MORNING TO STEADY YOUR NERVES TO GET RID OF A HANGOVER: NO
ALCOHOL_USE: NO
HOW MANY TIMES IN THE PAST YEAR HAVE YOU HAD 5 OR MORE DRINKS IN A DAY: 0
CONSUMPTION TOTAL: NEGATIVE
EVER FELT BAD OR GUILTY ABOUT YOUR DRINKING: NO
TOTAL SCORE: 0
DOES PATIENT WANT TO STOP DRINKING: NO
AVERAGE NUMBER OF DAYS PER WEEK YOU HAVE A DRINK CONTAINING ALCOHOL: 0
HAVE PEOPLE ANNOYED YOU BY CRITICIZING YOUR DRINKING: NO

## 2023-05-25 NOTE — CARE PLAN
The patient is Watcher - Medium risk of patient condition declining or worsening    Shift Goals  Clinical Goals: pain management, monitor electrolytes  Patient Goals: pain control, rest    Progress made toward(s) clinical / shift goals:       Problem: Pain - Standard  Goal: Alleviation of pain or a reduction in pain to the patient’s comfort goal  Outcome: Progressing     Problem: Knowledge Deficit - Standard  Goal: Patient and family/care givers will demonstrate understanding of plan of care, disease process/condition, diagnostic tests and medications  Outcome: Progressing     Problem: Fall Risk  Goal: Patient will remain free from falls  Outcome: Progressing     Problem: Skin Integrity  Goal: Skin integrity is maintained or improved  Outcome: Progressing     Patient is not progressing towards the following goals:

## 2023-05-25 NOTE — ASSESSMENT & PLAN NOTE
-with severe malnutrition and muscle wasting, cachexia  -Continued tube feeding per NGT.  Now at goal at 45 cc/h.      - Per surgery G-tube placement may be difficult due to her prior abdominal surgeries and recommended to discuss with GI for PEG placement once tube feeding is at goal (at 45cc/hr) for at least one week and no further signs of refeeding syndrome.    -- thus she replacement of PEG tube today with GI.  Will start tube feed tube tonight at midnight   --

## 2023-05-25 NOTE — PROGRESS NOTES
4 Eyes Skin Assessment Completed by LEANNE Perez and BEE Dia.    Head WDL  Ears WDL  Nose WDL  Mouth white patches back of throat  Neck WDL  Breast/Chest WDL  Shoulder Blades WDL  Spine WDL  (R) Arm/Elbow/Hand missing fingers  (L) Arm/Elbow/Hand WDL  Abdomen Scar  Groin WDL  Scrotum/Coccyx/Buttocks Redness and Blanching  (R) Leg WDL  (L) Leg WDL  (R) Heel/Foot/Toe Discoloration between toes, dry flaky skin  (L) Heel/Foot/Toe Discoloration between toes, dry flaky skin          Devices In Places Tele Box, Blood Pressure Cuff, and Pulse Ox      Interventions In Place Pillows    Possible Skin Injury No    Pictures Uploaded Into Epic N/A  Wound Consult Placed N/A  RN Wound Prevention Protocol Ordered No

## 2023-05-25 NOTE — ED PROVIDER NOTES
ER Provider Note    Scribed for Jim Cornell M.D. by Stoney Ro. 5/25/2023   10:21 AM    Primary Care Provider: Fidencio Christopher D.O.    CHIEF COMPLAINT  Chief Complaint   Patient presents with    ALOC     Per son, pt has been more confused for the last few days. States has UTI the last time she was here and admitted.      EXTERNAL RECORDS REVIEWED  Other shows that the patient has an extensive past medical history. Last seen here in the ED on 3/26/23.     HPI/ROS  LIMITATION TO HISTORY   Select: Altered mental status / Confusion  OUTSIDE HISTORIAN(S):  Family : Son    Mary Martinez is a 53 y.o. female who presents to the ED for evaluation of altered level of consciousness onset four days ago. Per son, patient has had increasing confusion for the last few days. He notes that the patient is oriented to person, place, and time, but is unable to recall basic events or names of everyday objects. Son states that the last time patient had similar symptoms, she was diagnosed with a urinary tract infection. Son reports worsening symptoms, prompting him to present to the ED today for further evaluation. Patient has associated abdominal pain, decreased appetite, nausea, vomiting, and general tiredness. Son adds that patient has some white spots to the back of her throat, and believes it is Thrush. Denies any fevers, dysuria, or diarrhea. No alleviating or exacerbating factors reported. Extensive past medical history of acute renal failure, arthritis, anemia, hypokalemia, hyponatremia, pneumonia, and pancreatitis. Drug allergies to Penicillins, Abilify, Aripiprazole, and Nitrous Oxide.     PAST MEDICAL HISTORY  Past Medical History:   Diagnosis Date    Acute renal failure (ARF) (HCC)     Allergy     Anemia     Anxiety     Arthritis     Rheumatoid -follows with rheumatologist. Has several fractures due to RA.    ASTHMA     inhalers prn    Blood transfusion without reported diagnosis     Bowel habit changes      4/24/20-constipation and diarrhea.    Bronchitis last approx 2018    Chronic pain 04/24/2020    Due to RA    Dental disorder     no teeth upper-does not wear her denture. Broken teeth on bottom.    Depression     GERD (gastroesophageal reflux disease)     GIB (gastrointestinal bleeding)     Head ache     Heart burn     taking famotidine    Hiatus hernia syndrome     History of pancreatitis     Hypokalemia     Hyponatremia     Idiopathic chronic pancreatitis (HCC)     Indigestion     taking famotidine    Intractable nausea and vomiting     Kidney disease     Pain     CPS-everywhere    Pneumonia 10/2019    PONV (postoperative nausea and vomiting)     Psychiatric problem     anxiety and depression    Rheumatoid aortitis     Rheumatoid arthritis(714.0) 2003    SMAS (superior mesenteric artery syndrome) (HCC)     Substance abuse (HCC)        SURGICAL HISTORY  Past Surgical History:   Procedure Laterality Date    VA UPPER GI ENDOSCOPY,DIAGNOSIS N/A 9/9/2022    Procedure: ENDOSCOPIC ULTRASOUND- UPPER;  Surgeon: Jorge Brooke M.D.;  Location: [facility];  Service: EUS    EGD W/ENDOSCOPIC ULTRASOUND N/A 9/9/2022    Procedure: EGD, WITH ENDOSCOPIC US;  Surgeon: Jorge Brooke M.D.;  Location: [facility];  Service: EUS    VA ENDOSCOPIC US EXAM, ESOPH  4/29/2020    Procedure: EGD, WITH ENDOSCOPIC US;  Surgeon: Jorge Brooke M.D.;  Location: [facility];  Service: Gastroenterology    GASTROSCOPY-ENDO  4/29/2020    Procedure: GASTROSCOPY;  Surgeon: Jorge Brooke M.D.;  Location: [facility];  Service: Gastroenterology    EGD WITH ASP/BX  4/29/2020    Procedure: EGD, WITH ASPIRATION BIOPSY - POSS FNA;  Surgeon: Jorge Brooke M.D.;  Location: [facility];  Service: Gastroenterology    VA EXPLORATORY OF ABDOMEN N/A 10/4/2019    Procedure: LAPAROTOMY, EXPLORATORY AND REPAIR OF DUODENAL PERFORATION;  Surgeon: James Dumont,  M.D.;  Location: [facility];  Service: General    COLONOSCOPY  2018    with upper endoscopy    FINGER ARTHROPLASTY Right 6/5/2017    Procedure: FINGER ARTHROPLASTY - LONG, RING AND SMALL VOLAR PLATE;  Surgeon: Lobo Rosen M.D.;  Location: SURGERY SAME DAY [facility];  Service:     FINGER AMPUTATION  6/5/2017    Procedure: FINGER AMPUTATION - LONG, RING AND SMALL AT THE PROXIMAL INTERPHALANGEAL JOINT;  Surgeon: Lobo Rosen M.D.;  Location: SURGERY SAME DAY [facility];  Service:     FINGER ARTHROPLASTY Right 4/17/2017    Procedure: FINGER ARTHROPLASTY ;  Surgeon: Lobo Rosen M.D.;  Location: SURGERY SAME DAY [facility];  Service:     FINGER AMPUTATION Right 9/14/2016    Procedure: FINGER AMPUTATION INDEX;  Surgeon: Lobo Rosen M.D.;  Location: SURGERY SAME DAY [facility];  Service:     IRRIGATION & DEBRIDEMENT ORTHO Right 9/11/2016    Procedure: IRRIGATION & DEBRIDEMENT ORTHO - right index finger;  Surgeon: Madhu Rosen M.D.;  Location: [facility];  Service:     FUSION, PIP JOINT, TOE Right 8/29/2016    Procedure: RE-DO INDEX FINGER PROXIMAL INTERPHALANGEAL ARTHRODESIS;  Surgeon: Lobo Rosen M.D.;  Location: SURGERY SAME DAY [facility];  Service:     BONE GRAFT Right 8/29/2016    Procedure: BONE GRAFT - DISTAL RADIUS ;  Surgeon: Lobo Rosen M.D.;  Location: SURGERY SAME DAY [facility];  Service:     ARTHRODESIS Right 5/6/2016    Procedure: ARTHRODESIS INDEX FINGER PROXIMAL INTERPHALANGEAL;  Surgeon: Lobo Rosen M.D.;  Location: SURGERY SAME DAY [facility];  Service:     FOOT RECONSTRUCTION RHEUMATIC Left 7/29/2015    Procedure: FOOT RECONSTRUCTION RHEUMATIC;  Surgeon: Heriberto Alves M.D.;  Location: SURGERY [facility];  Service:     TOE FUSION Right 5/27/2015    Procedure: TOE FUSION 1ST METATARSALPHALANGEAL JOINT;  Surgeon: Heriberto Alves M.D.;  Location: [facility];  Service:     BONE  SPUR EXCISION  5/27/2015    Procedure: BONE SPUR EXCISION METATARSAL HEAD 2-3;  Surgeon: Heriberto Alves M.D.;  Location: SURGERY [facility];  Service:     HAMMERTOE CORRECTION  5/27/2015    Procedure: HAMMERTOE CORRECTION AND SOFT TISSUE RE-ALINGMENT 2-3 ;  Surgeon: Heriberto Alves M.D.;  Location: SURGERY [facility];  Service:     DENTAL EXTRACTION(S)  2014    all of upper teeth    ABDOMINAL ABSCESS DRAINAGE  9/27/2011    Performed by VERO WRIGHT at SURGERY [facility]    EMANUEL BY LAPAROSCOPY  9/27/2011    Performed by VERO WRIGHT at SURGERY [facility]    APPENDECTOMY  2011    Found out it had ruptured prior to abcess surgery    REPEAT C SECTION  2008    REPEAT C SECTION  2005    REPEAT C SECTION  1999    PRIMARY C SECTION  1991    TONSILLECTOMY  1982    WRIST EXPLORATION Left 1980's    fractured wrist-no hardware       FAMILY HISTORY  Family History   Problem Relation Age of Onset    Cancer Mother     Heart Disease Mother     Hypertension Mother     Heart Disease Father     Hypertension Father        SOCIAL HISTORY   reports that she has been smoking cigarettes. She has a 30.00 pack-year smoking history. She has never used smokeless tobacco. She reports that she does not drink alcohol and does not use drugs.    CURRENT MEDICATIONS  Previous Medications    CYANOCOBALAMIN (VITAMIN B12) 1000 MCG TAB    Take 1 Tablet by mouth every morning with breakfast.    DULOXETINE (CYMBALTA) 30 MG CAP DR PARTICLES    Take 30 mg by mouth every day.    FOLIC ACID (FOLVITE) 1 MG TAB    Take 1 Tablet by mouth 2 times a day.    MIDODRINE (PROAMATINE) 10 MG TABLET    Take 1 Tablet by mouth 3 times a day with meals.    MULTIVITAMIN TAB    Take 1 Tablet by mouth every day.    NALOXONE (NARCAN) 4 MG/0.1ML LIQUID    One spray in one nostril for overdose and call 911.    OMEPRAZOLE (PRILOSEC) 20 MG DELAYED-RELEASE CAPSULE    Take 1 Capsule by mouth every day.    OXYCODONE IMMEDIATE-RELEASE (ROXICODONE) 5 MG  "TAB    Take 5 mg by mouth 3 times a day.    OXYCODONE-ACETAMINOPHEN (PERCOCET-10)  MG TAB    Take 1 Tablet by mouth every four hours as needed for Severe Pain for up to 7 days.    OXYCODONE-ACETAMINOPHEN (PERCOCET-10)  MG TAB    Take 1 Tablet by mouth every four hours as needed for Severe Pain for up to 7 days.    PAROXETINE (PAXIL) 30 MG TAB    Take 60 mg by mouth every evening. 2 tablet (60 mg)    QUETIAPINE (SEROQUEL) 100 MG TAB    Take 1 Tablet by mouth every evening.    SUCRALFATE (CARAFATE) 1 GM TAB    Take 1 Tablet by mouth 4 Times a Day,Before Meals and at Bedtime.       ALLERGIES  Allergies   Allergen Reactions    Penicillins Anaphylaxis and Hives     Tolerates cephalosporins; reports throat swelling with PCN    Abilify Unspecified     Multiple side effects    Aripiprazole Nausea     Spasms, shaking      Nitrous Oxide Vomiting    Citrus Rash and Itching     Only on Skin Contact        PHYSICAL EXAM  BP 98/68   Pulse 98   Temp 36.1 °C (97 °F) (Temporal)   Resp 16   Ht 1.6 m (5' 3\")   Wt 39 kg (86 lb)   LMP 09/21/2011   SpO2 100%   BMI 15.23 kg/m²      Nursing note and vitals reviewed.  Constitutional: Chronically ill and thin appearing.   HENT: Head is normocephalic and atraumatic. Dry mucous membranes. There are white exudates in the oral mucous that are consistent with Thrush.   Eyes: Pupils are equal, round, and reactive to light. Conjunctiva are normal.   Cardiovascular: Normal rate and regular rhythm. No murmur heard. Normal radial pulses.  Pulmonary/Chest: Breath sounds normal. No wheezes or rales.   Abdominal: Soft and non-tender. No distention    Musculoskeletal: Extremities exhibit normal range of motion without edema or tenderness. Right hand amputation noted.   Neurological: Awake, alert and oriented to person, place, and time. No focal deficits noted.  Skin: Skin is warm and dry. No rash.   Psychiatric: Normal mood and affect. Appropriate for clinical situation    DIAGNOSTIC " STUDIES    Labs:   Results for orders placed or performed during the hospital encounter of 05/25/23   CBC WITH DIFFERENTIAL   Result Value Ref Range    WBC 7.5 4.8 - 10.8 K/uL    RBC 3.35 (L) 4.20 - 5.40 M/uL    Hemoglobin 9.8 (L) 12.0 - 16.0 g/dL    Hematocrit 29.2 (L) 37.0 - 47.0 %    MCV 87.2 81.4 - 97.8 fL    MCH 29.3 27.0 - 33.0 pg    MCHC 33.6 32.2 - 35.5 g/dL    RDW 62.9 (H) 35.9 - 50.0 fL    Platelet Count 354 164 - 446 K/uL    MPV 10.1 9.0 - 12.9 fL    Neutrophils-Polys 76.80 (H) 44.00 - 72.00 %    Lymphocytes 11.30 (L) 22.00 - 41.00 %    Monocytes 9.90 0.00 - 13.40 %    Eosinophils 0.40 0.00 - 6.90 %    Basophils 0.50 0.00 - 1.80 %    Immature Granulocytes 1.10 (H) 0.00 - 0.90 %    Nucleated RBC 0.40 (H) 0.00 - 0.20 /100 WBC    Neutrophils (Absolute) 5.77 1.82 - 7.42 K/uL    Lymphs (Absolute) 0.85 (L) 1.00 - 4.80 K/uL    Monos (Absolute) 0.74 0.00 - 0.85 K/uL    Eos (Absolute) 0.03 0.00 - 0.51 K/uL    Baso (Absolute) 0.04 0.00 - 0.12 K/uL    Immature Granulocytes (abs) 0.08 0.00 - 0.11 K/uL    NRBC (Absolute) 0.03 K/uL   COMP METABOLIC PANEL   Result Value Ref Range    Sodium 133 (L) 135 - 145 mmol/L    Potassium 2.0 (LL) 3.6 - 5.5 mmol/L    Chloride 108 96 - 112 mmol/L    Co2 5 (LL) 20 - 33 mmol/L    Anion Gap 20.0 (H) 7.0 - 16.0    Glucose 102 (H) 65 - 99 mg/dL    Bun 35 (H) 8 - 22 mg/dL    Creatinine 1.18 0.50 - 1.40 mg/dL    Calcium 7.4 (L) 8.5 - 10.5 mg/dL    AST(SGOT) 6 (L) 12 - 45 U/L    ALT(SGPT) 9 2 - 50 U/L    Alkaline Phosphatase 195 (H) 30 - 99 U/L    Total Bilirubin <0.2 0.1 - 1.5 mg/dL    Albumin 3.0 (L) 3.2 - 4.9 g/dL    Total Protein 6.2 6.0 - 8.2 g/dL    Globulin 3.2 1.9 - 3.5 g/dL    A-G Ratio 0.9 g/dL   URINALYSIS CULTURE, IF INDICATED    Specimen: Urine, Clean Catch   Result Value Ref Range    Color Yellow     Character Turbid (A)     Specific Gravity 1.013 <1.035    Ph 6.0 5.0 - 8.0    Glucose Negative Negative mg/dL    Ketones Negative Negative mg/dL    Protein 100 (A) Negative mg/dL     Bilirubin Negative Negative    Urobilinogen, Urine 0.2 Negative    Nitrite Positive (A) Negative    Leukocyte Esterase Large (A) Negative    Occult Blood Moderate (A) Negative    Micro Urine Req Microscopic    MAGNESIUM   Result Value Ref Range    Magnesium 2.2 1.5 - 2.5 mg/dL   PHOSPHORUS   Result Value Ref Range    Phosphorus 2.9 2.5 - 4.5 mg/dL   URINE MICROSCOPIC (W/UA)   Result Value Ref Range    WBC Packed (A) /hpf    RBC 2-5 (A) /hpf    Bacteria Moderate (A) None /hpf    Epithelial Cells Negative /hpf    Hyaline Cast 0-2 /lpf   CORRECTED CALCIUM   Result Value Ref Range    Correct Calcium 8.2 (L) 8.5 - 10.5 mg/dL   ESTIMATED GFR   Result Value Ref Range    GFR (CKD-EPI) 56 (A) >60 mL/min/1.73 m 2          INITIAL ASSESSMENT AND PLAN  ED Observation Status? Yes; I am placing the patient in to an observation status due to a diagnostic uncertainty as well as therapeutic intensity. Patient placed in observation status at 10:27 AM, 5/25/2023.     Observation plan is as follows: We will manage her symptoms, evaluate with lab work, and then reassess after results are reviewed      Upon Reevaluation, the patient's condition has: not improved; and will be escalated to hospitalization.    Patient discharged from ED Observation status at 12:50 PM (Time) 5/25/2023 (Date).       10:21 AM - Patient was evaluated at bedside. Son at bedside. Patient presents to the ED for altered level of consciousness. Per son, patient is oriented to person, place, and time, but is unable to recall basic events or names of everyday objects. After my exam, I explained to the patient and son the plan of care, which includes treating the patient with fluids as she is dehydrated, as well as obtaining lab work for further evaluation. They understand and verbalize agreement to plan of care. Ordered for CBC with diff, CMP, UA culture, magnesium, and Phosphorus to evaluate.    10:33 AM - Patient will be treated with Zofran 4 mg, Dilaudid 1 mg,  LR infusion bolus, and Mycostatin 500,000 units.     11:48 AM - Ordered for Urine Culture and Urine Microscopic for further evaluation.     11:52 AM - Lab work is reviewed at this time, which shows that the urinalysis is consistent with a urinary tract infection. No recent prior urine culture.     12:15 PM - I updated the family on the plan of care, which includes admission. They understand and verbalize agreement from plan of care.     12:30 PM - Patient was treated with MBX oral 5 mL.     12:45 PM - Nurse informs me of critical labs: Potassium of 2.0 and a CO2 of 5.     12:50 PM - Paged Hospitalist     12:59 PM - Hospitalist responded. I discussed the patient's case and the above findings with Dr. Chin (Hospitalist) who agrees to evaluate the patient for hospitalization.      CRITICAL CARE  I provided critical care services, which included medication orders, frequent reevaluations of the patient's condition and response to treatment, ordering and reviewing test results, and discussing the case with various consultants.  The critical care time associated with the care of the patient was 35 minutes. Review chart for interventions. This time is exclusive of any other billable procedures.        COURSE AND MEDICAL DECISION MAKING    Patient presents today with severe metabolic derangements including very low potassium level.  She also has a significant metabolic acidosis, CO2 of only 6.  Likely combination of dehydration and starvation ketosis.    HYDRATION: Based on the patient's presentation of Dehydration the patient was given IV fluids. IV Hydration was used because oral hydration was not adequate alone. Upon recheck following hydration, the patient was improved.    The patient was treated with Zofran for nausea.  Placed on a cardiac monitor to monitor for any arrhythmia associated with Zofran and QT prolongation.    The patient was treated with IV narcotics for pain control.  Placed on cardiac and blood pressure  monitor to monitor for associated hypotension.  Also placed on pulse oximetry to monitor for hypoxia associated with medication administration/side effect.    DISPOSITION AND DISCUSSIONS    I have discussed management of the patient with the following physicians and HALIMA's:  Dr. Chin (Hospitalist)    Discussion of management with other [facility] or appropriate source(s): None     Patient will be hospitalized by Dr. Chin (Hospitalist)    FINAL DIAGNOSIS  1. Dehydration    2. Hypokalemia    3. Thrush    4. Urinary tract infection without hematuria, site unspecified    5. Metabolic acidosis    5.     The critical care time associated with the care of the patient was 35 minutes.     Stoney MOLINA (Scribe), am scribing for, and in the presence of, Jim Cornell M.D..    Electronically signed by: Stoney Ro (Scribvannesa), 5/25/2023    Jim MOLINA M.D. personally performed the services described in this documentation, as scribed by Stoney Ro in my presence, and it is both accurate and complete.      The note accurately reflects work and decisions made by me.  Jim Cornell M.D.  5/25/2023  1:38 PM

## 2023-05-25 NOTE — CONSULTS
DATE OF CONSULTATION:  5/25/2023     REFERRING PHYSICIAN:   Phoebe Chin M.D.     CONSULTING PHYSICIAN:  Citlalli Ramirez M.D.     REASON FOR CONSULTATION:  I have been asked by  to see the patient in surgical consultation for evaluation of feeding tube.    HISTORY OF PRESENT ILLNESS:   This is a 53-year-old woman with severe chronic abdominal pain and failure to thrive.  She has had severe nausea and vomiting which is reportedly attributable to her pain.  She is unable to take in adequate nutrition and was here in the hospital in January for the same problem.  At that time she declined a surgically placed feeding tube but had an NG tube and was receiving nutrition that way.  Unfortunately she is lost another 30 pounds since then and has reached a weight of 86 pounds.  She returns again with severe dehydration and malnutrition and is agreeable to a surgically placed feeding tube at this time.    PAST MEDICAL HISTORY:  has a past medical history of Acute renal failure (ARF) ([facility]), Allergy, Anemia, Anxiety, Arthritis, ASTHMA, Blood transfusion without reported diagnosis, Bowel habit changes, Bronchitis (last approx 2018), Chronic pain (04/24/2020), Dental disorder, Depression, GERD (gastroesophageal reflux disease), GIB (gastrointestinal bleeding), Head ache, Heart burn, Hiatus hernia syndrome, History of pancreatitis, Hypokalemia, Hyponatremia, Idiopathic chronic pancreatitis (HCC), Indigestion, Intractable nausea and vomiting, Kidney disease, Pain, Pneumonia (10/2019), PONV (postoperative nausea and vomiting), Psychiatric problem, Rheumatoid aortitis, Rheumatoid arthritis(714.0) (2003), SMAS (superior mesenteric artery syndrome) (HCC), and Substance abuse (HCC).    She has no past medical history of Addisons disease ([facility]), Adrenal disorder ([facility]), Arrhythmia, BRCA1 negative, BRCA1 positive, BRCA2 gene mutation positive, BRCA2 negative, Breast cancer (HCC), Cancer (HCC), Cancer of peritoneum (HCC),  Cataract, CHF (congestive heart failure) (HCC), Clotting disorder (HCC), COPD (chronic obstructive pulmonary disease) (HCC), Cushings syndrome (HCC), Diabetes (HCC), Diabetic neuropathy (HCC), Glaucoma, Goiter, Heart attack (HCC), Heart murmur, HIV (human immunodeficiency virus infection) (HCC), Hyperlipidemia, Hypertension, IBD (inflammatory bowel disease), Malignant neoplasm of fallopian tube (HCC), Meningitis, Migraine, Muscle disorder, Osteoporosis, Ovarian cancer (HCC), Parathyroid disorder (HCC), Pituitary disease (HCC), Pulmonary emphysema (HCC), Seizure (HCC), Sickle cell disease (HCC), Stroke (HCC), Thyroid disease, Tuberculosis, or Urinary tract infection.    PAST SURGICAL HISTORY:  has a past surgical history that includes abdominal abscess drainage (9/27/2011); toe fusion (Right, 5/27/2015); bone spur excision (5/27/2015); hammertoe correction (5/27/2015); foot reconstruction rheumatic (Left, 7/29/2015); arthrodesis (Right, 5/6/2016); fusion, pip joint, toe (Right, 8/29/2016); bone graft (Right, 8/29/2016); irrigation & debridement ortho (Right, 9/11/2016); finger amputation (Right, 9/14/2016); finger arthroplasty (Right, 4/17/2017); finger arthroplasty (Right, 6/5/2017); finger amputation (6/5/2017); pr exploratory of abdomen (N/A, 10/4/2019); tonsillectomy (1982); primary c section (1991); repeat c section (1999); repeat c section (2005); repeat c section (2008); appendectomy (2011); nicholas by laparoscopy (9/27/2011); wrist exploration (Left, 1980's); colonoscopy (2018); dental extraction(s) (2014); pr endoscopic us exam, esoph (4/29/2020); gastroscopy-endo (4/29/2020); egd with asp/bx (4/29/2020); pr upper gi endoscopy,diagnosis (N/A, 9/9/2022); and egd w/endoscopic ultrasound (N/A, 9/9/2022).    ALLERGIES:   Allergies   Allergen Reactions    Penicillins Anaphylaxis and Hives     Tolerates cephalosporins; reports throat swelling with PCN    Aripiprazole Nausea     Spasms, shaking      Citrus Rash and  Itching     Only on Skin Contact    Nitrous Oxide Vomiting       CURRENT MEDICATIONS:    Home Medications       Reviewed by Arnulfo Pablo (Pharmacy Tech) on 05/25/23 at 1345  Med List Status: Complete     Medication Last Dose Status   Naloxone (NARCAN) 4 MG/0.1ML Liquid prn Active   omeprazole (PRILOSEC) 20 MG delayed-release capsule 5/25/2023 Active   oxyCODONE-acetaminophen (PERCOCET-10)  MG Tab 5/25/2023 Active   PARoxetine (PAXIL) 30 MG Tab 5/24/2023 Active   QUEtiapine (SEROQUEL) 100 MG Tab 5/24/2023 Active                    FAMILY HISTORY: family history includes Cancer in her mother; Heart Disease in her father and mother; Hypertension in her father and mother.    SOCIAL HISTORY:  reports that she has been smoking cigarettes. She has a 30.00 pack-year smoking history. She has never used smokeless tobacco. She reports that she does not drink alcohol and does not use drugs.    REVIEW OF SYSTEMS: Review of systems is remarkable for the following severe abdominal pain, stable. The remainder of the comprehensive ROS is negative with the exception of the aforementioned HPI, PMH, and PSH bullets in accordance with CMS guideline.    PHYSICAL EXAMINATION:    Physical Exam  Constitutional:       Comments: Cachectic   HENT:      Head: Normocephalic and atraumatic.      Right Ear: External ear normal.      Left Ear: External ear normal.      Mouth/Throat:      Mouth: Mucous membranes are dry.   Eyes:      Extraocular Movements: Extraocular movements intact.   Cardiovascular:      Rate and Rhythm: Normal rate and regular rhythm.   Pulmonary:      Effort: Pulmonary effort is normal.   Abdominal:      General: Abdomen is flat. There is no distension.      Tenderness: There is abdominal tenderness. There is no guarding or rebound.   Musculoskeletal:         General: Normal range of motion.   Skin:     General: Skin is warm and dry.      Capillary Refill: Capillary refill takes less than 2 seconds.    Neurological:      General: No focal deficit present.      Mental Status: She is alert and oriented to person, place, and time.   Psychiatric:         Mood and Affect: Mood normal.         Behavior: Behavior normal.         Thought Content: Thought content normal.         LABORATORY VALUES:   Recent Labs     05/25/23  1148   WBC 7.5   RBC 3.35*   HEMOGLOBIN 9.8*   HEMATOCRIT 29.2*   MCV 87.2   MCH 29.3   MCHC 33.6   RDW 62.9*   PLATELETCT 354   MPV 10.1     Recent Labs     05/25/23  1148 05/25/23  1335   SODIUM 133* 132*   POTASSIUM 2.0* 2.4*   CHLORIDE 108 109   CO2 5* 5*   GLUCOSE 102* 104*   BUN 35* 32*   CREATININE 1.18 1.04   CALCIUM 7.4* 7.2*     Recent Labs     05/25/23  1148   ASTSGOT 6*   ALTSGPT 9   TBILIRUBIN <0.2   ALKPHOSPHAT 195*   GLOBULIN 3.2            IMAGING:   No orders to display       ASSESSMENT AND PLAN:   53-year-old woman with failure to thrive with starvation ketoacidosis    At this point unfortunately Mary is too severely ill to undergo any kind of elective surgery.  Her malnutrition is such that she would not be able to heal.  She is at severely high risk of refeeding syndrome and so it may be weeks until we can get her nutrition to the level where it would be appropriate to proceed with this elective surgery.  In the meantime she will need a nasojejunal tube, which may or may not be able to be placed by IR.  I will place an order but she may need a gastroenterology consult for evaluation for this.  Once her prealbumin has come back up to normal we can consider placement of a permanent tube.  Prior to then she is not a surgical candidate.  We will continue to follow.     ____________________________________     Citlalli Ramirez M.D.    DD: 5/25/2023  3:53 PM    ACS NSQIP Surgical Risk Calculator

## 2023-05-25 NOTE — H&P
Hospital Medicine History & Physical Note    Date of Service  5/25/2023    Primary Care Physician  Fidencio Christopher D.O.    Consultants  general surgery    Specialist Names: Dr. Ramirez     Code Status  DNAR/DNI    Chief Complaint  Chief Complaint   Patient presents with    ALOC     Per son, pt has been more confused for the last few days. States has UTI the last time she was here and admitted.        History of Presenting Illness  Mary Martinez is a 53 y.o. female who presented 5/25/2023 with altered mental status.  Per ERP patient's son reported that patient has been having increasing confusion for the last few days she is oriented x3 but was unable to recall every day objects and names.  He reported that she had similar symptoms in the past when she had UTI.  Patient reports that she has been extremely fatigued weak and unable to eat for the last 3 weeks.  She reports that she has a sore throat and difficulty swallowing.  Reports that she has been extremely dry which makes the swelling worse as well.  Patient does have chronic abdominal pain, she reports that its at her baseline.  Also has nausea and vomiting.  She reports for the last week she has been unable to get up and walk around, typically uses a walker.  She denies any dysuria but is having urinary urgency.  Patient reports that she has been trying to get a feeding tube for the last 6 months.  On chart review patient was previously evaluated by surgery for feeding tube placement however she refused surgery at that time.  After discussion at bedside patient does report that she wants surgery now as she feels like she is wasting away.    In the ER vital signs stable.  Labs significant for hemoglobin 9.8, hematocrit 29, sodium 133, potassium 2, CO2 5, anion gap 20, creatinine 1.18, GFR 56, calcium 8.2, albumin 3 with positive UA.  I have consulted surgery for evaluation for possible feeding tube placement.    I discussed the plan of care with  patient, bedside RN, and general surgery.    Review of Systems  Review of Systems   Constitutional:  Positive for malaise/fatigue and weight loss. Negative for chills and fever.   HENT:  Positive for sore throat.         Difficulty swallowing    Eyes:  Negative for blurred vision.   Respiratory:  Negative for cough and shortness of breath.    Cardiovascular:  Negative for chest pain and leg swelling.   Gastrointestinal:  Positive for abdominal pain and constipation. Negative for diarrhea, nausea and vomiting.   Genitourinary:  Positive for urgency. Negative for dysuria.   Musculoskeletal:  Negative for myalgias.   Skin:  Negative for rash.   Neurological:  Positive for weakness. Negative for dizziness and headaches.   Psychiatric/Behavioral:  Positive for depression. The patient is nervous/anxious.    All other systems reviewed and are negative.      Past Medical History   has a past medical history of Acute renal failure (ARF) ([facility]), Allergy, Anemia, Anxiety, Arthritis, ASTHMA, Blood transfusion without reported diagnosis, Bowel habit changes, Bronchitis (last approx 2018), Chronic pain (04/24/2020), Dental disorder, Depression, GERD (gastroesophageal reflux disease), GIB (gastrointestinal bleeding), Head ache, Heart burn, Hiatus hernia syndrome, History of pancreatitis, Hypokalemia, Hyponatremia, Idiopathic chronic pancreatitis (HCC), Indigestion, Intractable nausea and vomiting, Kidney disease, Pain, Pneumonia (10/2019), PONV (postoperative nausea and vomiting), Psychiatric problem, Rheumatoid aortitis, Rheumatoid arthritis(714.0) (2003), SMAS (superior mesenteric artery syndrome) (HCC), and Substance abuse (HCC).    Surgical History   has a past surgical history that includes abdominal abscess drainage (9/27/2011); toe fusion (Right, 5/27/2015); bone spur excision (5/27/2015); hammertoe correction (5/27/2015); foot reconstruction rheumatic (Left, 7/29/2015); arthrodesis (Right, 5/6/2016); fusion, pip joint, toe  (Right, 8/29/2016); bone graft (Right, 8/29/2016); irrigation & debridement ortho (Right, 9/11/2016); finger amputation (Right, 9/14/2016); finger arthroplasty (Right, 4/17/2017); finger arthroplasty (Right, 6/5/2017); finger amputation (6/5/2017); pr exploratory of abdomen (N/A, 10/4/2019); tonsillectomy (1982); primary c section (1991); repeat c section (1999); repeat c section (2005); repeat c section (2008); appendectomy (2011); nicholas by laparoscopy (9/27/2011); wrist exploration (Left, 1980's); colonoscopy (2018); dental extraction(s) (2014); pr endoscopic us exam, esoph (4/29/2020); gastroscopy-endo (4/29/2020); egd with asp/bx (4/29/2020); pr upper gi endoscopy,diagnosis (N/A, 9/9/2022); and egd w/endoscopic ultrasound (N/A, 9/9/2022).     Family History  family history includes Cancer in her mother; Heart Disease in her father and mother; Hypertension in her father and mother.   Family history reviewed with patient. There is no family history that is pertinent to the chief complaint.     Social History   reports that she has been smoking cigarettes. She has a 30.00 pack-year smoking history. She has never used smokeless tobacco. She reports that she does not drink alcohol and does not use drugs.    Allergies  Allergies   Allergen Reactions    Penicillins Anaphylaxis and Hives     Tolerates cephalosporins; reports throat swelling with PCN    Aripiprazole Nausea     Spasms, shaking      Citrus Rash and Itching     Only on Skin Contact    Nitrous Oxide Vomiting       Medications  Prior to Admission Medications   Prescriptions Last Dose Informant Patient Reported? Taking?   DULoxetine (CYMBALTA) 30 MG Cap DR Particles  Patient Yes No   Sig: Take 30 mg by mouth every day.   Naloxone (NARCAN) 4 MG/0.1ML Liquid  Patient No No   Sig: One spray in one nostril for overdose and call 911.   PARoxetine (PAXIL) 30 MG Tab  Patient Yes No   Sig: Take 60 mg by mouth every evening. 2 tablet (60 mg)   QUEtiapine (SEROQUEL) 100  MG Tab  Patient No No   Sig: Take 1 Tablet by mouth every evening.   cyanocobalamin (VITAMIN B12) 1000 MCG Tab  Patient No No   Sig: Take 1 Tablet by mouth every morning with breakfast.   folic acid (FOLVITE) 1 MG Tab  Patient No No   Sig: Take 1 Tablet by mouth 2 times a day.   midodrine (PROAMATINE) 10 MG tablet  Patient No No   Sig: Take 1 Tablet by mouth 3 times a day with meals.   multivitamin Tab  Patient No No   Sig: Take 1 Tablet by mouth every day.   omeprazole (PRILOSEC) 20 MG delayed-release capsule  Patient No No   Sig: Take 1 Capsule by mouth every day.   Patient taking differently: Take 20 mg by mouth every evening.   oxyCODONE immediate-release (ROXICODONE) 5 MG Tab  Patient Yes No   Sig: Take 5 mg by mouth 3 times a day.   oxyCODONE-acetaminophen (PERCOCET-10)  MG Tab   No No   Sig: Take 1 Tablet by mouth every four hours as needed for Severe Pain for up to 7 days.   oxyCODONE-acetaminophen (PERCOCET-10)  MG Tab   No No   Sig: Take 1 Tablet by mouth every four hours as needed for Severe Pain for up to 7 days.   sucralfate (CARAFATE) 1 GM Tab  Patient No No   Sig: Take 1 Tablet by mouth 4 Times a Day,Before Meals and at Bedtime.      Facility-Administered Medications: None       Physical Exam  Temp:  [36.1 °C (97 °F)] 36.1 °C (97 °F)  Pulse:  [98] 98  Resp:  [16] 16  BP: (98)/(68) 98/68  SpO2:  [100 %] 100 %  Blood Pressure: 98/68   Temperature: 36.1 °C (97 °F)   Pulse: 98   Respiration: 16   Pulse Oximetry: 100 %       Physical Exam  Vitals and nursing note reviewed.   Constitutional:       General: She is not in acute distress.     Appearance: She is cachectic. She is ill-appearing and toxic-appearing.      Comments: Temporal wasting, diffuse muscle wasting    HENT:      Head: Normocephalic and atraumatic.      Right Ear: External ear normal.      Left Ear: External ear normal.      Nose: Nose normal.      Mouth/Throat:      Mouth: Mucous membranes are dry.      Comments: Thrush in the  back of the mouth   Eyes:      Extraocular Movements: Extraocular movements intact.      Conjunctiva/sclera: Conjunctivae normal.      Pupils: Pupils are equal, round, and reactive to light.   Cardiovascular:      Rate and Rhythm: Normal rate and regular rhythm.      Heart sounds: Normal heart sounds.   Pulmonary:      Effort: Pulmonary effort is normal. No respiratory distress.      Breath sounds: Normal breath sounds. No wheezing or rales.   Abdominal:      General: Abdomen is flat. There is no distension.      Tenderness: There is abdominal tenderness (diffuse). There is guarding.   Musculoskeletal:      Cervical back: Normal range of motion and neck supple.      Right lower leg: No edema.      Left lower leg: No edema.      Comments: Partial hand amputation on the right at the MCPs   Skin:     General: Skin is warm and dry.   Neurological:      General: No focal deficit present.      Mental Status: She is alert and oriented to person, place, and time.      Cranial Nerves: No cranial nerve deficit.      Sensory: No sensory deficit.      Motor: Weakness present.   Psychiatric:         Mood and Affect: Mood is anxious and depressed. Affect is tearful.         Behavior: Behavior normal.         Laboratory:  Recent Labs     05/25/23  1148   WBC 7.5   RBC 3.35*   HEMOGLOBIN 9.8*   HEMATOCRIT 29.2*   MCV 87.2   MCH 29.3   MCHC 33.6   RDW 62.9*   PLATELETCT 354   MPV 10.1     Recent Labs     05/25/23  1148 05/25/23  1335   SODIUM 133* 132*   POTASSIUM 2.0* 2.4*   CHLORIDE 108 109   CO2 5* 5*   GLUCOSE 102* 104*   BUN 35* 32*   CREATININE 1.18 1.04   CALCIUM 7.4* 7.2*     Recent Labs     05/25/23  1148 05/25/23  1335   ALTSGPT 9  --    ASTSGOT 6*  --    ALKPHOSPHAT 195*  --    TBILIRUBIN <0.2  --    GLUCOSE 102* 104*         No results for input(s): NTPROBNP in the last 72 hours.      No results for input(s): TROPONINT in the last 72 hours.    Imaging:  No orders to display       EKG:  I have personally reviewed the  images and compared with prior images.    Assessment/Plan:  Justification for Admission Status  I anticipate this patient will require at least two midnights for appropriate medical management, necessitating inpatient admission because high anion gap metabolic acidosis due to starvation ketosis, severe hypokalemia needing telemetry monitoring.  Patient also with oral thrush needs nystatin treatment.  Surgery has also been consulted for possibility of feeding tube placement as this has been a recurrent problem for this patient and she is now agreeable to surgery.      * High anion gap metabolic acidosis- (present on admission)  Assessment & Plan  Due to starvation ketosis  Lactic acid negative  IV fluids  Repeat BMP this evening    HASMUKH (acute kidney injury) ([facility])- (present on admission)  Assessment & Plan  Creatinine 1.18 on admission  Likely due to severe dehydration  IV fluids  Avoid nephrotoxic medications  Repeat BMP in a.m.    UTI (urinary tract infection)- (present on admission)  Assessment & Plan  With urinary urgency   Continue rocephin  Urine culture pending     Thrush- (present on admission)  Assessment & Plan  Noted on exam  Nystatin ordered  SLP evaluation as patient reports dysphagia    Failure to thrive in adult- (present on admission)  Assessment & Plan  Severe malnutrition and muscle wasting, cachexia  Nutrition consulted  Aggressive IV fluid hydration with correction of electrolytes  Patient refusing NG tube placement for nutrition  Surgery consulted for possible feeding tube placement  PT/OT-patient reports she has been unable to walk for the last week    Hypocalcemia- (present on admission)  Assessment & Plan  Corrected calcium 8.2  Started Tums  Repeat in a.m.    Bipolar 1 disorder (HCC)- (present on admission)  Assessment & Plan  Continue Paxil and Seroquel    Starvation ketoacidosis- (present on admission)  Assessment & Plan  Poor oral intake over the last 3 weeks  Aggressive IV fluid  hydration  Nutrition is consulted    Chronic pain- (present on admission)  Assessment & Plan  Patient on home oxycodone  Pain management  Continue Paxil    Hypokalemia- (present on admission)  Assessment & Plan  Severe, potassium 2 on admission  IV and oral replacement  Repeat BMP this afternoon and in the morning    Hyponatremia- (present on admission)  Assessment & Plan  Sodium 133 on admission  Likely due to severe dehydration  IV fluids  Repeat BMP        VTE prophylaxis: SCDs/TEDs and enoxaparin ppx

## 2023-05-25 NOTE — PROGRESS NOTES
Report received from Rn. Assumed pt care. Pt. Placed on zoll monitor and escorted to T715.  Pt A&O x 4. Fall precautions in place, call light and belongings within reach, bed in lowest position. No signs of distress.

## 2023-05-25 NOTE — ED NOTES
Lab called - potassium 2.0, Co2 5. Result read back to lab. Dr. Cornell notified verbally at time of critical result report.

## 2023-05-25 NOTE — ED NOTES
Pharmacy Medication Reconciliation      ~Medication reconciliation updated and complete per patient at bedside & patient home pharmacy   ~Allergies have been verified and updated   ~No oral ABX within the last 30 days  ~Patient home pharmacy :  [facility]

## 2023-05-25 NOTE — ASSESSMENT & PLAN NOTE
-Patient on home oxycodone but narcotics may be contributing to her chronic emesis.   - Continue Paxil.  Continue  Po pain meds

## 2023-05-25 NOTE — ED TRIAGE NOTES
Chief Complaint   Patient presents with    ALOC     Per son, pt has been more confused for the last few days. States has UTI the last time she was here and admitted.      Pt to triage in WC with family for above.     Pt currently knows the date and where she is. Pt reports abd pain and a sore throat.

## 2023-05-26 ENCOUNTER — APPOINTMENT (OUTPATIENT)
Dept: RADIOLOGY | Facility: MEDICAL CENTER | Age: 53
DRG: 682 | End: 2023-05-26
Attending: HOSPITALIST
Payer: MEDICAID

## 2023-05-26 LAB
ALBUMIN SERPL BCP-MCNC: 2.7 G/DL (ref 3.2–4.9)
ALBUMIN/GLOB SERPL: 0.8 G/DL
ALP SERPL-CCNC: 178 U/L (ref 30–99)
ALT SERPL-CCNC: 8 U/L (ref 2–50)
ANION GAP SERPL CALC-SCNC: 11 MMOL/L (ref 7–16)
ANION GAP SERPL CALC-SCNC: 12 MMOL/L (ref 7–16)
ANION GAP SERPL CALC-SCNC: 13 MMOL/L (ref 7–16)
ANION GAP SERPL CALC-SCNC: 17 MMOL/L (ref 7–16)
AST SERPL-CCNC: 5 U/L (ref 12–45)
BILIRUB SERPL-MCNC: 0.2 MG/DL (ref 0.1–1.5)
BUN SERPL-MCNC: 13 MG/DL (ref 8–22)
BUN SERPL-MCNC: 16 MG/DL (ref 8–22)
BUN SERPL-MCNC: 19 MG/DL (ref 8–22)
BUN SERPL-MCNC: 23 MG/DL (ref 8–22)
CALCIUM ALBUM COR SERPL-MCNC: 8.8 MG/DL (ref 8.5–10.5)
CALCIUM SERPL-MCNC: 7.6 MG/DL (ref 8.5–10.5)
CALCIUM SERPL-MCNC: 7.8 MG/DL (ref 8.5–10.5)
CALCIUM SERPL-MCNC: 7.8 MG/DL (ref 8.5–10.5)
CALCIUM SERPL-MCNC: 7.9 MG/DL (ref 8.5–10.5)
CHLORIDE SERPL-SCNC: 115 MMOL/L (ref 96–112)
CHLORIDE SERPL-SCNC: 116 MMOL/L (ref 96–112)
CHLORIDE SERPL-SCNC: 116 MMOL/L (ref 96–112)
CHLORIDE SERPL-SCNC: 119 MMOL/L (ref 96–112)
CO2 SERPL-SCNC: 11 MMOL/L (ref 20–33)
CO2 SERPL-SCNC: 12 MMOL/L (ref 20–33)
CO2 SERPL-SCNC: 5 MMOL/L (ref 20–33)
CO2 SERPL-SCNC: 9 MMOL/L (ref 20–33)
CREAT SERPL-MCNC: 0.78 MG/DL (ref 0.5–1.4)
CREAT SERPL-MCNC: 0.79 MG/DL (ref 0.5–1.4)
CREAT SERPL-MCNC: 0.88 MG/DL (ref 0.5–1.4)
CREAT SERPL-MCNC: 0.94 MG/DL (ref 0.5–1.4)
EKG IMPRESSION: NORMAL
ERYTHROCYTE [DISTWIDTH] IN BLOOD BY AUTOMATED COUNT: 64.1 FL (ref 35.9–50)
GFR SERPLBLD CREATININE-BSD FMLA CKD-EPI: 73 ML/MIN/1.73 M 2
GFR SERPLBLD CREATININE-BSD FMLA CKD-EPI: 79 ML/MIN/1.73 M 2
GFR SERPLBLD CREATININE-BSD FMLA CKD-EPI: 90 ML/MIN/1.73 M 2
GFR SERPLBLD CREATININE-BSD FMLA CKD-EPI: 92 ML/MIN/1.73 M 2
GLOBULIN SER CALC-MCNC: 3.2 G/DL (ref 1.9–3.5)
GLUCOSE SERPL-MCNC: 126 MG/DL (ref 65–99)
GLUCOSE SERPL-MCNC: 136 MG/DL (ref 65–99)
GLUCOSE SERPL-MCNC: 140 MG/DL (ref 65–99)
GLUCOSE SERPL-MCNC: 174 MG/DL (ref 65–99)
HCT VFR BLD AUTO: 27.6 % (ref 37–47)
HGB BLD-MCNC: 9.2 G/DL (ref 12–16)
LACTATE SERPL-SCNC: 1 MMOL/L (ref 0.5–2)
LIPASE SERPL-CCNC: 33 U/L (ref 11–82)
MAGNESIUM SERPL-MCNC: 1.9 MG/DL (ref 1.5–2.5)
MAGNESIUM SERPL-MCNC: 2 MG/DL (ref 1.5–2.5)
MCH RBC QN AUTO: 29.5 PG (ref 27–33)
MCHC RBC AUTO-ENTMCNC: 33.3 G/DL (ref 32.2–35.5)
MCV RBC AUTO: 88.5 FL (ref 81.4–97.8)
PHOSPHATE SERPL-MCNC: 1 MG/DL (ref 2.5–4.5)
PHOSPHATE SERPL-MCNC: 2 MG/DL (ref 2.5–4.5)
PLATELET # BLD AUTO: 370 K/UL (ref 164–446)
PMV BLD AUTO: 10.1 FL (ref 9–12.9)
POTASSIUM SERPL-SCNC: 2.4 MMOL/L (ref 3.6–5.5)
POTASSIUM SERPL-SCNC: 2.7 MMOL/L (ref 3.6–5.5)
POTASSIUM SERPL-SCNC: 2.7 MMOL/L (ref 3.6–5.5)
POTASSIUM SERPL-SCNC: 2.9 MMOL/L (ref 3.6–5.5)
PROT SERPL-MCNC: 5.9 G/DL (ref 6–8.2)
RBC # BLD AUTO: 3.12 M/UL (ref 4.2–5.4)
SODIUM SERPL-SCNC: 137 MMOL/L (ref 135–145)
SODIUM SERPL-SCNC: 138 MMOL/L (ref 135–145)
SODIUM SERPL-SCNC: 140 MMOL/L (ref 135–145)
SODIUM SERPL-SCNC: 141 MMOL/L (ref 135–145)
WBC # BLD AUTO: 8.4 K/UL (ref 4.8–10.8)

## 2023-05-26 PROCEDURE — 700102 HCHG RX REV CODE 250 W/ 637 OVERRIDE(OP): Performed by: HOSPITALIST

## 2023-05-26 PROCEDURE — 83605 ASSAY OF LACTIC ACID: CPT

## 2023-05-26 PROCEDURE — 700102 HCHG RX REV CODE 250 W/ 637 OVERRIDE(OP): Performed by: EMERGENCY MEDICINE

## 2023-05-26 PROCEDURE — 700111 HCHG RX REV CODE 636 W/ 250 OVERRIDE (IP): Performed by: HOSPITALIST

## 2023-05-26 PROCEDURE — 83690 ASSAY OF LIPASE: CPT

## 2023-05-26 PROCEDURE — A9270 NON-COVERED ITEM OR SERVICE: HCPCS | Performed by: HOSPITALIST

## 2023-05-26 PROCEDURE — 99024 POSTOP FOLLOW-UP VISIT: CPT | Performed by: NURSE PRACTITIONER

## 2023-05-26 PROCEDURE — 84100 ASSAY OF PHOSPHORUS: CPT

## 2023-05-26 PROCEDURE — 93010 ELECTROCARDIOGRAM REPORT: CPT | Performed by: INTERNAL MEDICINE

## 2023-05-26 PROCEDURE — 99255 IP/OBS CONSLTJ NEW/EST HI 80: CPT | Performed by: NURSE PRACTITIONER

## 2023-05-26 PROCEDURE — 700102 HCHG RX REV CODE 250 W/ 637 OVERRIDE(OP): Performed by: INTERNAL MEDICINE

## 2023-05-26 PROCEDURE — 99406 BEHAV CHNG SMOKING 3-10 MIN: CPT | Performed by: HOSPITALIST

## 2023-05-26 PROCEDURE — 93005 ELECTROCARDIOGRAM TRACING: CPT

## 2023-05-26 PROCEDURE — 700111 HCHG RX REV CODE 636 W/ 250 OVERRIDE (IP)

## 2023-05-26 PROCEDURE — 700111 HCHG RX REV CODE 636 W/ 250 OVERRIDE (IP): Performed by: INTERNAL MEDICINE

## 2023-05-26 PROCEDURE — 80053 COMPREHEN METABOLIC PANEL: CPT

## 2023-05-26 PROCEDURE — A9270 NON-COVERED ITEM OR SERVICE: HCPCS | Performed by: INTERNAL MEDICINE

## 2023-05-26 PROCEDURE — A9270 NON-COVERED ITEM OR SERVICE: HCPCS | Performed by: EMERGENCY MEDICINE

## 2023-05-26 PROCEDURE — 700105 HCHG RX REV CODE 258: Performed by: HOSPITALIST

## 2023-05-26 PROCEDURE — A9270 NON-COVERED ITEM OR SERVICE: HCPCS

## 2023-05-26 PROCEDURE — 92610 EVALUATE SWALLOWING FUNCTION: CPT

## 2023-05-26 PROCEDURE — 700102 HCHG RX REV CODE 250 W/ 637 OVERRIDE(OP)

## 2023-05-26 PROCEDURE — 770020 HCHG ROOM/CARE - TELE (206)

## 2023-05-26 PROCEDURE — 36415 COLL VENOUS BLD VENIPUNCTURE: CPT

## 2023-05-26 PROCEDURE — 83735 ASSAY OF MAGNESIUM: CPT

## 2023-05-26 PROCEDURE — 99233 SBSQ HOSP IP/OBS HIGH 50: CPT | Mod: 25 | Performed by: HOSPITALIST

## 2023-05-26 PROCEDURE — 700111 HCHG RX REV CODE 636 W/ 250 OVERRIDE (IP): Performed by: NURSE PRACTITIONER

## 2023-05-26 PROCEDURE — 97165 OT EVAL LOW COMPLEX 30 MIN: CPT

## 2023-05-26 PROCEDURE — 85027 COMPLETE CBC AUTOMATED: CPT

## 2023-05-26 PROCEDURE — 80048 BASIC METABOLIC PNL TOTAL CA: CPT

## 2023-05-26 RX ORDER — ACETAMINOPHEN 325 MG/1
650 TABLET ORAL EVERY 6 HOURS PRN
Status: DISCONTINUED | OUTPATIENT
Start: 2023-05-26 | End: 2023-06-10

## 2023-05-26 RX ORDER — POTASSIUM CHLORIDE 7.45 MG/ML
10 INJECTION INTRAVENOUS
Status: COMPLETED | OUTPATIENT
Start: 2023-05-26 | End: 2023-05-26

## 2023-05-26 RX ORDER — POTASSIUM CHLORIDE 7.45 MG/ML
10 INJECTION INTRAVENOUS
Status: DISCONTINUED | OUTPATIENT
Start: 2023-05-27 | End: 2023-05-26

## 2023-05-26 RX ORDER — GAUZE BANDAGE 2" X 2"
100 BANDAGE TOPICAL DAILY
Status: DISCONTINUED | OUTPATIENT
Start: 2023-05-26 | End: 2023-06-10

## 2023-05-26 RX ORDER — BISACODYL 10 MG
10 SUPPOSITORY, RECTAL RECTAL
Status: DISCONTINUED | OUTPATIENT
Start: 2023-05-26 | End: 2023-06-10

## 2023-05-26 RX ORDER — AMOXICILLIN 250 MG
2 CAPSULE ORAL 2 TIMES DAILY
Status: DISCONTINUED | OUTPATIENT
Start: 2023-05-26 | End: 2023-06-10

## 2023-05-26 RX ORDER — PAROXETINE HYDROCHLORIDE 20 MG/1
60 TABLET, FILM COATED ORAL EVERY EVENING
Status: DISCONTINUED | OUTPATIENT
Start: 2023-05-26 | End: 2023-06-10

## 2023-05-26 RX ORDER — QUETIAPINE FUMARATE 100 MG/1
100 TABLET, FILM COATED ORAL EVERY EVENING
Status: DISCONTINUED | OUTPATIENT
Start: 2023-05-26 | End: 2023-06-10

## 2023-05-26 RX ORDER — CALCIUM CARBONATE 500 MG/1
500 TABLET, CHEWABLE ORAL 3 TIMES DAILY
Status: DISCONTINUED | OUTPATIENT
Start: 2023-05-26 | End: 2023-06-10

## 2023-05-26 RX ORDER — PROMETHAZINE HYDROCHLORIDE 25 MG/1
12.5-25 TABLET ORAL EVERY 4 HOURS PRN
Status: DISCONTINUED | OUTPATIENT
Start: 2023-05-26 | End: 2023-06-10

## 2023-05-26 RX ORDER — OXYCODONE HYDROCHLORIDE 10 MG/1
10 TABLET ORAL
Status: DISCONTINUED | OUTPATIENT
Start: 2023-05-26 | End: 2023-05-28

## 2023-05-26 RX ORDER — HYDROMORPHONE HYDROCHLORIDE 1 MG/ML
0.25 INJECTION, SOLUTION INTRAMUSCULAR; INTRAVENOUS; SUBCUTANEOUS
Status: DISCONTINUED | OUTPATIENT
Start: 2023-05-26 | End: 2023-05-28

## 2023-05-26 RX ORDER — POTASSIUM CHLORIDE 7.45 MG/ML
10 INJECTION INTRAVENOUS
Status: COMPLETED | OUTPATIENT
Start: 2023-05-27 | End: 2023-05-27

## 2023-05-26 RX ORDER — OXYCODONE HYDROCHLORIDE 5 MG/1
5 TABLET ORAL
Status: DISCONTINUED | OUTPATIENT
Start: 2023-05-26 | End: 2023-05-28

## 2023-05-26 RX ORDER — POLYETHYLENE GLYCOL 3350 17 G/17G
1 POWDER, FOR SOLUTION ORAL
Status: DISCONTINUED | OUTPATIENT
Start: 2023-05-26 | End: 2023-06-10

## 2023-05-26 RX ORDER — POTASSIUM CHLORIDE 20 MEQ/1
40 TABLET, EXTENDED RELEASE ORAL EVERY 4 HOURS
Status: DISCONTINUED | OUTPATIENT
Start: 2023-05-26 | End: 2023-05-26

## 2023-05-26 RX ORDER — POTASSIUM CHLORIDE 7.45 MG/ML
10 INJECTION INTRAVENOUS
Status: DISCONTINUED | OUTPATIENT
Start: 2023-05-26 | End: 2023-05-26

## 2023-05-26 RX ORDER — ONDANSETRON 4 MG/1
4 TABLET, ORALLY DISINTEGRATING ORAL EVERY 4 HOURS PRN
Status: DISCONTINUED | OUTPATIENT
Start: 2023-05-26 | End: 2023-06-10

## 2023-05-26 RX ADMIN — NYSTATIN 500000 UNITS: 100000 SUSPENSION ORAL at 20:07

## 2023-05-26 RX ADMIN — CEFTRIAXONE SODIUM 1000 MG: 10 INJECTION, POWDER, FOR SOLUTION INTRAVENOUS at 17:22

## 2023-05-26 RX ADMIN — DOCUSATE SODIUM 50 MG AND SENNOSIDES 8.6 MG 2 TABLET: 8.6; 5 TABLET, FILM COATED ORAL at 17:21

## 2023-05-26 RX ADMIN — NYSTATIN 500000 UNITS: 100000 SUSPENSION ORAL at 09:08

## 2023-05-26 RX ADMIN — PROCHLORPERAZINE EDISYLATE 10 MG: 5 INJECTION INTRAMUSCULAR; INTRAVENOUS at 04:49

## 2023-05-26 RX ADMIN — POTASSIUM CHLORIDE 10 MEQ: 7.46 INJECTION, SOLUTION INTRAVENOUS at 06:15

## 2023-05-26 RX ADMIN — NYSTATIN 500000 UNITS: 100000 SUSPENSION ORAL at 17:20

## 2023-05-26 RX ADMIN — CALCIUM CARBONATE 500 MG: 500 TABLET, CHEWABLE ORAL at 12:38

## 2023-05-26 RX ADMIN — ENOXAPARIN SODIUM 40 MG: 100 INJECTION SUBCUTANEOUS at 17:20

## 2023-05-26 RX ADMIN — PAROXETINE HYDROCHLORIDE 60 MG: 20 TABLET, FILM COATED ORAL at 17:21

## 2023-05-26 RX ADMIN — QUETIAPINE FUMARATE 100 MG: 100 TABLET ORAL at 17:21

## 2023-05-26 RX ADMIN — OMEPRAZOLE AND SODIUM BICARBONATE 40 MG: KIT at 17:21

## 2023-05-26 RX ADMIN — LIDOCAINE HYDROCHLORIDE 5 ML: 20 SOLUTION ORAL; TOPICAL at 04:49

## 2023-05-26 RX ADMIN — POTASSIUM CHLORIDE 10 MEQ: 7.46 INJECTION, SOLUTION INTRAVENOUS at 09:00

## 2023-05-26 RX ADMIN — POTASSIUM CHLORIDE 10 MEQ: 7.46 INJECTION, SOLUTION INTRAVENOUS at 09:09

## 2023-05-26 RX ADMIN — Medication 100 MG: at 17:21

## 2023-05-26 RX ADMIN — POTASSIUM CHLORIDE 10 MEQ: 7.46 INJECTION, SOLUTION INTRAVENOUS at 05:24

## 2023-05-26 RX ADMIN — HYDROMORPHONE HYDROCHLORIDE 0.25 MG: 1 INJECTION, SOLUTION INTRAMUSCULAR; INTRAVENOUS; SUBCUTANEOUS at 12:39

## 2023-05-26 RX ADMIN — OXYCODONE HYDROCHLORIDE 10 MG: 10 TABLET ORAL at 09:18

## 2023-05-26 RX ADMIN — CALCIUM CARBONATE 500 MG: 500 TABLET, CHEWABLE ORAL at 04:39

## 2023-05-26 RX ADMIN — POTASSIUM CHLORIDE 10 MEQ: 7.46 INJECTION, SOLUTION INTRAVENOUS at 23:28

## 2023-05-26 RX ADMIN — ONDANSETRON 4 MG: 2 INJECTION INTRAMUSCULAR; INTRAVENOUS at 09:18

## 2023-05-26 RX ADMIN — ANTACID TABLETS 500 MG: 500 TABLET, CHEWABLE ORAL at 17:21

## 2023-05-26 RX ADMIN — OXYCODONE HYDROCHLORIDE 10 MG: 10 TABLET ORAL at 17:21

## 2023-05-26 RX ADMIN — NYSTATIN 500000 UNITS: 100000 SUSPENSION ORAL at 12:38

## 2023-05-26 RX ADMIN — HYDROMORPHONE HYDROCHLORIDE 0.25 MG: 1 INJECTION, SOLUTION INTRAMUSCULAR; INTRAVENOUS; SUBCUTANEOUS at 20:27

## 2023-05-26 RX ADMIN — POTASSIUM CHLORIDE: 149 INJECTION, SOLUTION, CONCENTRATE INTRAVENOUS at 17:19

## 2023-05-26 ASSESSMENT — ENCOUNTER SYMPTOMS
CARDIOVASCULAR NEGATIVE: 1
DIZZINESS: 0
RESPIRATORY NEGATIVE: 1
DIAPHORESIS: 0
NAUSEA: 1
FEVER: 0
DEPRESSION: 0
ABDOMINAL PAIN: 1
PSYCHIATRIC NEGATIVE: 1
NEUROLOGICAL NEGATIVE: 1
CHILLS: 0
BRUISES/BLEEDS EASILY: 0
WEAKNESS: 1
MYALGIAS: 0
EYES NEGATIVE: 1
BLOOD IN STOOL: 0
WEIGHT LOSS: 1
SORE THROAT: 0
COUGH: 0
HEADACHES: 0
WEAKNESS: 0
BACK PAIN: 0
SHORTNESS OF BREATH: 0
CONSTIPATION: 0
BLURRED VISION: 0
FOCAL WEAKNESS: 0
VOMITING: 1
HEARTBURN: 0
PALPITATIONS: 0
MUSCULOSKELETAL NEGATIVE: 1
DIARRHEA: 0

## 2023-05-26 ASSESSMENT — COGNITIVE AND FUNCTIONAL STATUS - GENERAL
PERSONAL GROOMING: A LITTLE
SUGGESTED CMS G CODE MODIFIER DAILY ACTIVITY: CK
DRESSING REGULAR LOWER BODY CLOTHING: A LOT
DAILY ACTIVITIY SCORE: 15
DRESSING REGULAR UPPER BODY CLOTHING: A LITTLE
HELP NEEDED FOR BATHING: A LOT
EATING MEALS: A LITTLE
TOILETING: A LOT

## 2023-05-26 ASSESSMENT — ACTIVITIES OF DAILY LIVING (ADL): TOILETING: INDEPENDENT

## 2023-05-26 ASSESSMENT — LIFESTYLE VARIABLES: SUBSTANCE_ABUSE: 0

## 2023-05-26 ASSESSMENT — FIBROSIS 4 INDEX: FIB4 SCORE: 0.24

## 2023-05-26 NOTE — DIETARY
"Nutrition Support Assessment   Day 1 of admit.  Mary Martinez is a 53 y.o. female with admitting DX of High anion gap metabolic acidosis [E87.29]     Current problem list:  High anion gap metabolic acidosis  Hyponatremia  Pancreatitis  Tobacco dependence  heumatoid arthritis   Hypokalemia   HASMUKH  Hypophosphatemia   Chronic pain   Unintentional weight loss  SMAS (superior mesenteric artery syndrome) c/b feeding issues   Severe malnutrition   Bipolar 1 disorder   Hypocalcemia  Failure to thrive in adult   Thrush  UTI      Assessment:  Estimated Nutritional Needs: based on:   Height: 160 cm (5' 3\")  Weight: 39.1 kg (86 lb 3.2 oz)  Body mass index is 15.27 kg/m²., BMI classification: Underweight    Calculation/Equation: MSJ x 1.3 - 1.4 = 1268 - 1366 kcal/day  Total Calories / day: 1268 - 1369 (Calories / k.4 - 35)  Total Grams Protein / day: 39 - 51 (Grams Protein / k - 1.3)     Evaluation:   Pt is severely malnourished and unable to maintain adequate PO intake. RD visited pt at bedside; pt's family present as well. Pt reports unable to eat x 6 weeks, complains of thrus >/=2 weeks which she states has made it very difficult for her to maintain adequate fluid intake. States pain with chewing and swallowing. RD reviewed process for start advancement of tube feeding with pt and family. They report pt's wt fluctuating in past 5 months, lowest wt 70 lb and highest wt 110 lb but unable to provide approximate dates for wts.  Surgery consulted for elective J-tube placement: \"Highly recommend patient needs Dobbhoff or feeding source to determine if patient can even tolerate refeeding prior to surgical intervention. Concern patient may be risk for refeeding syndrome. Currently not a surgical candidate.\"   Labs: K+ 2.7, CO2 9, glu 174, Ca+ 7.8, phos 2.0  MAR: zofran, Kphos, KCl, compazine  Skin: no documented wounds or edema  GI: none documented  Nutrition-focused physical exam: scooping temporal depressions, " sunken/hollowed orbital region     Malnutrition Risk: Pt meets criteria for severe chronic malnutrition related to N/V in setting of SMA syndrome exacerbated by thrush AEB severe fat wasting, severe muscle wasting, and reported PO intake <50% of nutrition needs for >1 month per pt report.     Recommendations/Plan:  Recommend correcting electrolyte abnormalities prior to initiating nutrition support due to very high risk of refeeding syndrome. MD Segal notified of recommendation.  In setting of SMA syndrome, jejunal placement of nasoenteral tube is indicated.  Pt at risk of refeeding syndrome: add 100 mg/d Thiamine to aid in glucose metabolism. Monitor electrolytes: order K+, mag, phos labs x 5-7 days or until stable for >/=48 hours with nutrition support at full goal. Refeeding labs will be ordered q8hrs for >/=3 days of starting nutrition support; currently, BMP q8hrs is ordered per EMR. Reviewed plan/recommendations with MD Segal.  Once electrolyte abnormalities have been corrected, initiate Fibersource HN at 5 ml/hr. Advance by 5 ml every 24 hours for first three days of nutrition support (through 5/29). RD to follow closely and adjust tube feeding advancement rate as indicated.   Final goal rate will be 45 ml/hr which will provide 1296 kcals, 58 grams protein, 874 mL free water.    Additional fluids to be provided by MD. Consider additional free water flush 300-400 ml daily (~1 ml/kcal, 30 ml/kg).  Monitor weight trends.  Monitor for development of edema.    RD following.

## 2023-05-26 NOTE — CARE PLAN
The patient is Watcher - Medium risk of patient condition declining or worsening    Shift Goals  Clinical Goals: Pain mgmt, rest  Patient Goals: sleep  Family Goals: No Family Present    Progress made toward(s) clinical / shift goals:    Problem: Pain - Standard  Goal: Alleviation of pain or a reduction in pain to the patient’s comfort goal  Description: Target End Date:  Prior to discharge or change in level of care    Document on Vitals flowsheet    1.  Document pain using the appropriate pain scale per order or unit policy  2.  Educate and implement non-pharmacologic comfort measures (i.e. relaxation, distraction, massage, cold/heat therapy, etc.)  3.  Pain management medications as ordered  4.  Reassess pain after pain med administration per policy  5.  If opiods administered assess patient's response to pain medication is appropriate per POSS sedation scale  6.  Follow pain management plan developed in collaboration with patient and interdisciplinary team (including palliative care or pain specialists if applicable)  Outcome: Progressing     Problem: Knowledge Deficit - Standard  Goal: Patient and family/care givers will demonstrate understanding of plan of care, disease process/condition, diagnostic tests and medications  Description: Target End Date:  1-3 days or as soon as patient condition allows    Document in Patient Education    1.  Patient and family/caregiver oriented to unit, equipment, visitation policy and means for communicating concern  2.  Complete/review Learning Assessment  3.  Assess knowledge level of disease process/condition, treatment plan, diagnostic tests and medications  4.  Explain disease process/condition, treatment plan, diagnostic tests and medications  Outcome: Progressing     Problem: Fall Risk  Goal: Patient will remain free from falls  Description: Target End Date:  Prior to discharge or change in level of care    Document interventions on the [facility] Fall Risk  Assessment    1.  Assess for fall risk factors  2.  Implement fall precautions  Outcome: Progressing     Problem: Skin Integrity  Goal: Skin integrity is maintained or improved  Description: Target End Date:  Prior to discharge or change in level of care    Document interventions on Skin Risk/Zan flowsheet groups and corresponding LDA    1.  Assess and monitor skin integrity, appearance and/or temperature  2.  Assess risk factors for impaired skin integrity and/or pressures ulcers  3.  Implement precautions to protect skin integrity in collaboration with interdisciplinary team  4.  Implement pressure ulcer prevention protocol if at risk for skin breakdown  5.  Confirm wound care consult if at risk for skin breakdown  6.  Ensure patient use of pressure relieving devices  (Low air loss bed, waffle overlay, heel protectors, ROHO cushion, etc)  Outcome: Progressing       Patient is not progressing towards the following goals:

## 2023-05-26 NOTE — CONSULTS
..Date of Consultation:  5/26/2023    Patient: : Mary Martinez  MRN: [id number]    Referring Physician: Dr. Disha Segal     GI:BIANCA Peterson     Reason for Consultation: PEG-J placement    History of Present Illness: Mary Martinez is a 53-year-old female with past medical history of rheumatoid arthritis, previous kidney injury, anxiety, asthma, GERD, chronic abdominal pain, tractable nausea and vomiting, history of pancreatitis, and irregular bowel movements.  She was last admitted in March for where she was found to have bilateral nephrolithiasis.  She presented on 5/25/2023 with chief complaint of altered mental status.  Patient reports extreme fatigue and inability to eat for the last 3 weeks along with sore throat and difficulty swallowing.  Continues to endorse abdominal pain, nausea, and vomiting.  Now with significant dysfunction and unable to walk around.  It appears that patient reported that she has been unable to get a repeat feeding tube, however she has been offered during the last hospitalization and she refused.  She now desires to have a feeding tube placed.    Regarding GI medical history, the patient had EGD and EUS in September 2022 where she also had a celiac plexus injection with EUS support.  The patient reported that her symptoms only improved for maybe 3 weeks after the procedure, and that she has no intention to repeat again.     Patient was evaluated by surgery who determined that she is too ill to undergo elective surgery.  She is also deemed to be is very high risk for refeeding syndrome. They recommended nasojejunal tube and therefore gastroenterology was consulted.      Patient seen and examined.  Reports continued abdominal pain with associated nausea and vomiting.  She also endorses sore throat and difficulty swallowing. VSS. Electrolyte derangements noted to be improving, K now 2.9, chloride rising to 119, Co2 improved to 11, calcium 7.9. Albumin 2.7, alk phos  178.    Past Medical History:   Diagnosis Date    Chronic pain 04/24/2020    Due to RA    Pneumonia 10/2019    Rheumatoid arthritis(714.0) 2003    Acute renal failure (ARF) (HCC)     Allergy     Anemia     Anxiety     Arthritis     Rheumatoid -follows with rheumatologist. Has several fractures due to RA.    ASTHMA     inhalers prn    Blood transfusion without reported diagnosis     Bowel habit changes     4/24/20-constipation and diarrhea.    Bronchitis last approx 2018    Dental disorder     no teeth upper-does not wear her denture. Broken teeth on bottom.    Depression     GERD (gastroesophageal reflux disease)     GIB (gastrointestinal bleeding)     Head ache     Heart burn     taking famotidine    Hiatus hernia syndrome     History of pancreatitis     Hypokalemia     Hyponatremia     Idiopathic chronic pancreatitis (HCC)     Indigestion     taking famotidine    Intractable nausea and vomiting     Kidney disease     Pain     CPS-everywhere    PONV (postoperative nausea and vomiting)     Psychiatric problem     anxiety and depression    Rheumatoid aortitis     SMAS (superior mesenteric artery syndrome) ([facility])     Substance abuse ([facility])          Past Surgical History:   Procedure Laterality Date    OH UPPER GI ENDOSCOPY,DIAGNOSIS N/A 9/9/2022    Procedure: ENDOSCOPIC ULTRASOUND- UPPER;  Surgeon: Jorge Brooke M.D.;  Location: [facility];  Service: EUS    EGD W/ENDOSCOPIC ULTRASOUND N/A 9/9/2022    Procedure: EGD, WITH ENDOSCOPIC US;  Surgeon: Jorge Brooke M.D.;  Location: [facility];  Service: EUS    OH ENDOSCOPIC US EXAM, ESOPH  4/29/2020    Procedure: EGD, WITH ENDOSCOPIC US;  Surgeon: Jorge Brooke M.D.;  Location: [facility];  Service: Gastroenterology    GASTROSCOPY-ENDO  4/29/2020    Procedure: GASTROSCOPY;  Surgeon: Jorge Brooke M.D.;  Location: [facility];  Service: Gastroenterology    EGD WITH ASP/BX  4/29/2020     Procedure: EGD, WITH ASPIRATION BIOPSY - POSS FNA;  Surgeon: Jorge Brooke M.D.;  Location: SURGERY [facility];  Service: Gastroenterology    LA EXPLORATORY OF ABDOMEN N/A 10/4/2019    Procedure: LAPAROTOMY, EXPLORATORY AND REPAIR OF DUODENAL PERFORATION;  Surgeon: James Dumont M.D.;  Location: [facility];  Service: General    COLONOSCOPY  2018    with upper endoscopy    FINGER ARTHROPLASTY Right 6/5/2017    Procedure: FINGER ARTHROPLASTY - LONG, RING AND SMALL VOLAR PLATE;  Surgeon: Lobo Rosen M.D.;  Location: SURGERY SAME DAY [facility];  Service:     FINGER AMPUTATION  6/5/2017    Procedure: FINGER AMPUTATION - LONG, RING AND SMALL AT THE PROXIMAL INTERPHALANGEAL JOINT;  Surgeon: Lobo Rosen M.D.;  Location: SURGERY SAME DAY [facility];  Service:     FINGER ARTHROPLASTY Right 4/17/2017    Procedure: FINGER ARTHROPLASTY ;  Surgeon: Lobo Rosen M.D.;  Location: SURGERY SAME DAY [facility];  Service:     FINGER AMPUTATION Right 9/14/2016    Procedure: FINGER AMPUTATION INDEX;  Surgeon: Lobo Rosen M.D.;  Location: SURGERY SAME DAY [facility];  Service:     IRRIGATION & DEBRIDEMENT ORTHO Right 9/11/2016    Procedure: IRRIGATION & DEBRIDEMENT ORTHO - right index finger;  Surgeon: Madhu Rosen M.D.;  Location: [facility];  Service:     FUSION, PIP JOINT, TOE Right 8/29/2016    Procedure: RE-DO INDEX FINGER PROXIMAL INTERPHALANGEAL ARTHRODESIS;  Surgeon: Lobo Rosen M.D.;  Location: SURGERY SAME DAY [facility];  Service:     BONE GRAFT Right 8/29/2016    Procedure: BONE GRAFT - DISTAL RADIUS ;  Surgeon: Lobo Rosen M.D.;  Location: SURGERY SAME DAY [facility];  Service:     ARTHRODESIS Right 5/6/2016    Procedure: ARTHRODESIS INDEX FINGER PROXIMAL INTERPHALANGEAL;  Surgeon: Lobo Rosen M.D.;  Location: SURGERY SAME DAY [facility];  Service:     FOOT RECONSTRUCTION RHEUMATIC Left 7/29/2015     Procedure: FOOT RECONSTRUCTION RHEUMATIC;  Surgeon: Heriberto Alves M.D.;  Location: SURGERY [facility];  Service:     TOE FUSION Right 5/27/2015    Procedure: TOE FUSION 1ST METATARSALPHALANGEAL JOINT;  Surgeon: Heriberto Alves M.D.;  Location: SURGERY [facility];  Service:     BONE SPUR EXCISION  5/27/2015    Procedure: BONE SPUR EXCISION METATARSAL HEAD 2-3;  Surgeon: Heriberto Alves M.D.;  Location: SURGERY [facility];  Service:     HAMMERTOE CORRECTION  5/27/2015    Procedure: HAMMERTOE CORRECTION AND SOFT TISSUE RE-ALINGMENT 2-3 ;  Surgeon: Heriberto Alves M.D.;  Location: SURGERY [facility];  Service:     DENTAL EXTRACTION(S)  2014    all of upper teeth    ABDOMINAL ABSCESS DRAINAGE  9/27/2011    Performed by VERO WRIGHT at SURGERY [facility]    EMANUEL BY LAPAROSCOPY  9/27/2011    Performed by VERO WRIGHT at SURGERY [facility]    APPENDECTOMY  2011    Found out it had ruptured prior to abcess surgery    REPEAT C SECTION  2008    REPEAT C SECTION  2005    REPEAT C SECTION  1999    PRIMARY C SECTION  1991    TONSILLECTOMY  1982    WRIST EXPLORATION Left 1980's    fractured wrist-no hardware       Family History   Problem Relation Age of Onset    Cancer Mother     Heart Disease Mother     Hypertension Mother     Heart Disease Father     Hypertension Father        Social History     Socioeconomic History    Marital status:      Spouse name: Ousmane    Number of children: 5   Tobacco Use    Smoking status: Every Day     Packs/day: 1.00     Years: 30.00     Pack years: 30.00     Types: Cigarettes    Smokeless tobacco: Never   Vaping Use    Vaping Use: Never used   Substance and Sexual Activity    Alcohol use: Never    Drug use: Never    Sexual activity: Not Currently   Other Topics Concern     Service No    Blood Transfusions Yes    Caffeine Concern No    Occupational Exposure No    Hobby Hazards No    Sleep Concern No    Stress Concern Yes    Weight  Concern No    Special Diet No    Back Care No    Exercise Yes    Bike Helmet Yes    Seat Belt Yes    Self-Exams Yes   Social History Narrative    ** Merged History Encounter **          Social Determinants of Health     Financial Resource Strain: Low Risk  (9/2/2022)    Overall Financial Resource Strain (CARDIA)     Difficulty of Paying Living Expenses: Not hard at all   Food Insecurity: No Food Insecurity (9/2/2022)    Hunger Vital Sign     Worried About Running Out of Food in the Last Year: Never true     Ran Out of Food in the Last Year: Never true   Transportation Needs: No Transportation Needs (9/2/2022)    PRAPARE - Transportation     Lack of Transportation (Medical): No     Lack of Transportation (Non-Medical): No       Review of systems:  Review of Systems   Constitutional:  Positive for malaise/fatigue. Negative for chills and fever.   HENT:  Negative for hearing loss.    Eyes:  Negative for blurred vision.   Respiratory:  Negative for cough and shortness of breath.    Cardiovascular:  Negative for chest pain and leg swelling.   Gastrointestinal:  Positive for abdominal pain, nausea and vomiting. Negative for blood in stool, constipation, diarrhea, heartburn and melena.   Genitourinary:  Negative for dysuria.   Musculoskeletal:  Negative for back pain.   Skin:  Negative for rash.   Neurological:  Positive for weakness. Negative for dizziness.   Psychiatric/Behavioral:  Negative for depression.    All other systems reviewed and are negative.        Physical Exam:  Vitals:    05/25/23 2000 05/26/23 0324 05/26/23 0725 05/26/23 1117   BP: 96/65 97/60 103/68 98/62   Pulse: (!) 103 93 88 93   Resp:  17 16 16   Temp:  36.5 °C (97.7 °F) 37.3 °C (99.1 °F) 36.9 °C (98.4 °F)   TempSrc:  Temporal Temporal Temporal   SpO2: 98% 97% 98% 98%   Weight:       Height:           Physical Exam  Vitals and nursing note reviewed.   Constitutional:       Appearance: She is ill-appearing.      Comments: Frail and  cachectic  Temporal and clavicular wasting   HENT:      Head: Normocephalic and atraumatic.      Right Ear: Tympanic membrane normal.      Left Ear: Tympanic membrane normal.      Mouth/Throat:      Mouth: Mucous membranes are dry.      Comments: Erythematous and dry mucous membranes   Thrush posterior mouth and tongue  Eyes:      General: No scleral icterus.  Cardiovascular:      Rate and Rhythm: Normal rate and regular rhythm.      Pulses: Normal pulses.      Heart sounds: Normal heart sounds.   Pulmonary:      Effort: Pulmonary effort is normal.      Breath sounds: Normal breath sounds.   Abdominal:      General: Abdomen is flat. There is no distension.      Tenderness: There is abdominal tenderness.   Musculoskeletal:         General: Normal range of motion.      Cervical back: Normal range of motion.   Skin:     General: Skin is warm and dry.      Capillary Refill: Capillary refill takes less than 2 seconds.      Coloration: Skin is pale.   Neurological:      Mental Status: She is alert and oriented to person, place, and time.   Psychiatric:         Mood and Affect: Mood normal.         Behavior: Behavior normal.           Labs:  Recent Labs     05/25/23  1148 05/26/23  0129   WBC 7.5 8.4   RBC 3.35* 3.12*   HEMOGLOBIN 9.8* 9.2*   HEMATOCRIT 29.2* 27.6*   MCV 87.2 88.5   MCH 29.3 29.5   MCHC 33.6 33.3   RDW 62.9* 64.1*   PLATELETCT 354 370   MPV 10.1 10.1     Recent Labs     05/25/23  1335 05/26/23  0129 05/26/23  0748   SODIUM 132* 137 138   POTASSIUM 2.4* 2.4* 2.7*   CHLORIDE 109 115* 116*   CO2 5* 5* 9*   GLUCOSE 104* 140* 174*   BUN 32* 23* 19           Recent Labs     05/25/23  1148 05/26/23  0129   ASTSGOT 6* 5*   ALTSGPT 9 8   TBILIRUBIN <0.2 0.2   ALKPHOSPHAT 195* 178*   GLOBULIN 3.2 3.2         Imaging:  CT-ABDOMEN-PELVIS W/O  Narrative:   3/24/2023 1:08 AM    HISTORY/REASON FOR EXAM: .    TECHNIQUE/EXAM DESCRIPTION:  CT scan of the abdomen and pelvis without contrast.    Noncontrast helical scanning  was obtained from the diaphragmatic domes through the pubic symphysis.    Low dose optimization technique was utilized for this CT exam including automated exposure control and adjustment of the mA and/or kV according to patient size.    COMPARISON: February 17, 2023    FINDINGS:    Lower Chest: Linear densities of the bilateral lung bases are seen favoring changes of atelectasis. 3 mm right middle lobe pulmonary nodule is seen on image 1.    Liver: Hepatomegaly is seen. There is mild intrahepatic biliary ductal dilatation identified.    Spleen: Unremarkable.    Pancreas: 4 mm dilatation the pancreatic duct is seen.    Gallbladder: The gallbladder has been resected.    Biliary: 11 mm dilatation the common bile duct is seen.    Adrenal glands: Normal.    Kidneys: Bilateral renal calculi are seen, otherwise unremarkable without hydronephrosis.    Bowel: Mild fluid-filled prominence of the small bowel is seen. The appendix appears within normal limits.    Lymph nodes: No adenopathy.    Vasculature: Unremarkable.    Peritoneum: Unremarkable without ascites.    Musculoskeletal: No acute or destructive process.    Pelvis: No adenopathy or free fluid. Uterus and adnexal structures appear within physiologic limits. Sullivan catheter is seen within the bladder appear  Impression: 1.  Mild intrahepatic and extra hepatic biliary and pancreatic ductal dilatation, commonly associated with postcholecystectomy physiology. Consider causes of biliary obstruction with additional workup as clinically appropriate.  2.  Mild fluid-filled prominence of small bowel suggesting ileus and/or enteritis.  3.  Bilateral nephrolithiasis without visualized obstructive changes  4.  Right middle lobe pulmonary nodule, see nodule follow-up recommendations below.    Fleischner Society pulmonary nodule recommendations:    Low Risk: No routine follow-up    High Risk: Optional CT at 12 months    Comments: Nodules less than 6 mm do not require routine  follow-up, but certain patients at high risk with suspicious nodule morphology, upper lobe location, or both may warrant 12-month follow-up.    Low Risk - Minimal or absent history of smoking and of other known risk factors.    High Risk - History of smoking or of other known risk factors.    Note: These recommendations do not apply to lung cancer screening, patients with immunosuppression, or patients with known primary cancer.    Fleischner Society 2017 Guidelines for Management of Incidentally Detected Pulmonary Nodules in Adults      Impressions:  Failure to thrive  Starvation ketoacidosis  Chronic abdominal pain  Severe malnutrition  Hepatomegaly   Acute kidney injury  High anion gap metabolic acidosis  Electrolyte derangements  Hypokalemia  Hyponatremia  Hypocalcemia  Urinary tract infection  Thrush  Bipolar 1 disorder    MDM:  Patient is a 53-year-old female with past medical history significant for chronic abdominal pain, failure to thrive, inability to take oral intake secondary to nausea and vomiting who requires a feeding tube for nutrition.  Per chart review, patient has previously been offered however declined surgery or agree at that time.  She has now agreed to move forward with PEG-J placement.    Recommendations:  Patient needs electrolytes corrected and optimized prior to sedation for PEG-J  GI team can offer on PEG-J placement on Monday    This note was generated using voice recognition software which has a small chance of producing errors of grammar and possibly content. I have made every reasonable attempt to find and correct any obvious errors, but expect that some may not be found prior to finalization of this note.

## 2023-05-26 NOTE — DISCHARGE PLANNING
Case Management Discharge Planning    Admission Date: 5/25/2023  GMLOS: 4.3  ALOS: 1    6-Clicks ADL Score: 13  6-Clicks Mobility Score: 11    PT and/or OT Eval ordered: Yes - evals pending  Post-acute Referrals Ordered: No  Post-acute Choice Obtained: No  Has referral(s) been sent to post-acute provider:  No      Anticipated Discharge Dispo: Discharge Disposition: D/T to SNF with Medicare cert in anticipation of skilled care (03)    DME Needed: No    Action(s) Taken: OTHER    Escalations Completed: None    Medically Clear: No    Next Steps: f/u with pt and medical team to discuss dc needs and barriers.  Assessment to be completed to assess for HCM needs.    Barriers to Discharge: Medical clearance and Pending PT Evaluation

## 2023-05-26 NOTE — ASSESSMENT & PLAN NOTE
-Related to SMA and inability to keep food down.  -HIV and lipase negative.  -Malnutrition contributing  - PEG tube in place  RD consult for tube feed

## 2023-05-26 NOTE — CARE PLAN
The patient is Watcher - Medium risk of patient condition declining or worsening    Shift Goals  Clinical Goals: Pain mgmt, Sleep  Patient Goals: Sleep  Family Goals: No Family Present    Progress made toward(s) clinical / shift goals:  Pt medicated for mouth throat and abdominal pain - see MAR.  Pt OOB to bathroom to void. Pt is able to make needs known. Will continue with plan of care.     Patient is not progressing towards the following goals:      Problem: Pain - Standard  Goal: Alleviation of pain or a reduction in pain to the patient’s comfort goal  Outcome: Not Met       Problem: Knowledge Deficit - Standard  Goal: Patient and family/care givers will demonstrate understanding of plan of care, disease process/condition, diagnostic tests and medications  Outcome: Progressing     Problem: Fall Risk  Goal: Patient will remain free from falls  Outcome: Progressing     Problem: Skin Integrity  Goal: Skin integrity is maintained or improved  Outcome: Progressing

## 2023-05-26 NOTE — PROGRESS NOTES
ECG changes and  Critical potassium of 2.4 received from lab.  Results forwarded to Jewel Bustillos and new orders received.    0450 - Medications vomitted. Jewel Bustillos notified.  IV potassium ordered    0640- IV infiltrated.  New IV established.  Potassium chloride rate decreased

## 2023-05-26 NOTE — PROGRESS NOTES
"  DATE: 5/26/2023    Surgical consult for elective request for J-tube.    INTERVAL EVENTS:  Awaiting correction of patient's electrolytes and nutrition status prior to consideration for J-tube placement.  Highly recommend patient needs Dobbhoff or feeding source to determine if patient can even tolerate refeeding prior to surgical intervention.    Concern patient may be risk for refeeding syndrome  Currently not a surgical candidate      PHYSICAL EXAMINATION:  Vital Signs: BP 98/62   Pulse 93   Temp 36.9 °C (98.4 °F) (Temporal)   Resp 16   Ht 1.6 m (5' 3\")   Wt 39.1 kg (86 lb 3.2 oz)   SpO2 98%       LABORATORY VALUES:   Recent Labs     05/25/23  1148 05/26/23  0129   WBC 7.5 8.4   RBC 3.35* 3.12*   HEMOGLOBIN 9.8* 9.2*   HEMATOCRIT 29.2* 27.6*   MCV 87.2 88.5   MCH 29.3 29.5   MCHC 33.6 33.3   RDW 62.9* 64.1*   PLATELETCT 354 370   MPV 10.1 10.1     Recent Labs     05/25/23  1335 05/26/23  0129 05/26/23  0748   SODIUM 132* 137 138   POTASSIUM 2.4* 2.4* 2.7*   CHLORIDE 109 115* 116*   CO2 5* 5* 9*   GLUCOSE 104* 140* 174*   BUN 32* 23* 19   CREATININE 1.04 0.88 0.94   CALCIUM 7.2* 7.8* 7.8*     Recent Labs     05/25/23  1148 05/26/23  0129   ASTSGOT 6* 5*   ALTSGPT 9 8   TBILIRUBIN <0.2 0.2   ALKPHOSPHAT 195* 178*   GLOBULIN 3.2 3.2                   ____________________________________     NANCY Helm.    DD: 5/26/2023  1:57 PM    "

## 2023-05-26 NOTE — THERAPY
Occupational Therapy   Initial Evaluation     Patient Name: Mary Martinez  Age:  53 y.o., Sex:  female  Medical Record #: [id number]  Today's Date: 5/26/2023     Precautions  Precautions: Fall Risk, Swallow Precautions    Assessment  Patient is 53 y.o. female with a diagnosis of N/V, FTT, UTI, abdom pain, awaiting tube feed placement, thrush, AMS, starvation ketosis, HASMUKH.  Additional factors influencing patient status / progress: Pt has support from her kids when available, but she does provide some support for her 21 year old son with Down Syndrome. Pt is currently too weak and at risk for falls to be home for any period of time alone. At current level of function pt requires post acute placement. If pt improves to level that she can be home alone for short stints, she will be safe to dc with  OT. Pt can benefit from acute OT to increase independence in ADLs prior to dc.     Plan    Occupational Therapy Initial Treatment Plan   Treatment Interventions: Therapeutic Activity, Therapeutic Exercises, Neuro Re-Education / Balance, Self Care / Activities of Daily Living  Treatment Frequency: 3 Times per Week  Duration: Until Therapy Goals Met    DC Equipment Recommendations: Tub / Shower Seat, Tub Transfer Bench  Discharge Recommendations: Recommend post-acute placement for additional occupational therapy services prior to discharge home        05/26/23 1305   Prior Living Situation   Prior Services None;Home-Independent   Housing / Facility 1 Story House   Steps Into Home 1   Steps In Home 0   Bathroom Set up Bathtub / Shower Combination;Grab Bars   Equipment Owned Single Point Cane;4-Wheel Walker;Wheelchair   Lives with - Patient's Self Care Capacity Child Less than 18 Years of Age;Adult Children   Comments Kids can assist when available; pt has 21 y.o. son with Downs, 18 y.o. dtr, 14 y.o. son   Prior Level of ADL Function   Self Feeding Independent   Grooming / Hygiene Independent   Bathing Independent    Dressing Independent   Toileting Independent   Prior Level of IADL Function   Medication Management Independent   Laundry Independent   Kitchen Mobility Independent   Finances Independent   Home Management Independent   Shopping Independent   Prior Level Of Mobility Independent Without Device in Community;Independent With Device in Community;Independent Without Device in Home   Driving / Transportation Relatives / Others Provide Transportation   Comments Pt intermittently uses a device as needed   History of Falls   History of Falls Yes   Date of Last Fall   (pt cannot recall date)   Precautions   Precautions Fall Risk;Swallow Precautions   Strength Upper Body   Upper Body Strength  X   Gross Strength Generalized Weakness, Equal Bilaterally.    Comments pt with constant shoulder pain bilat   Balance Assessment   Sitting Balance (Static) Fair   Sitting Balance (Dynamic) Fair -   Standing Balance (Static) Poor   Standing Balance (Dynamic) Poor -   Weight Shift Sitting Fair   Weight Shift Standing Poor   Comments limited by severe pain knees and shoulders   Bed Mobility    Supine to Sit Minimal Assist   Sit to Supine Minimal Assist   Scooting Supervised   ADL Assessment   Eating Supervision   Grooming Supervision   Upper Body Dressing Minimal Assist   Lower Body Dressing Maximal Assist   Comments limited by severe pain   Functional Mobility   Sit to Stand Moderate Assist   Bed, Chair, Wheelchair Transfer   (sit to stand and side steps only due to pain levels)   Transfer Method Stand Step   Comments with HHA   Activity Tolerance   Sitting Edge of Bed 7 min   Standing 1 min   Patient / Family Goals   Patient / Family Goal #1 Less pain   Short Term Goals   Short Term Goal # 1 Xfer to chair or BSC with supervision   Short Term Goal # 2 standing grooming at sink with supervision   Occupational Therapy Initial Treatment Plan    Treatment Interventions Therapeutic Activity;Therapeutic Exercises;Neuro Re-Education /  Balance;Self Care / Activities of Daily Living   Treatment Frequency 3 Times per Week   Duration Until Therapy Goals Met   Problem List   Problem List Decreased Active Daily Living Skills;Decreased Homemaking Skills;Decreased Functional Mobility;Decreased Activity Tolerance;Impaired Postural Control / Balance   Anticipated Discharge Equipment and Recommendations   DC Equipment Recommendations Tub / Shower Seat;Tub Transfer Bench   Discharge Recommendations Recommend post-acute placement for additional occupational therapy services prior to discharge home

## 2023-05-26 NOTE — ASSESSMENT & PLAN NOTE
-With chronic deformity and s/p amputations  -No evidence of acute flair  -Continue chronic analgesics.

## 2023-05-26 NOTE — ASSESSMENT & PLAN NOTE
-Replaced.  -Resolved but high risk for fluctuation and remains high risk for refeeding syndrome  -Continue to closely follow level daily

## 2023-05-26 NOTE — ASSESSMENT & PLAN NOTE
-She remains very high risk for refeeding syndrome, will continue to follow very closely with serial electrolytes.  -Continue tube feeds as above.

## 2023-05-26 NOTE — PROGRESS NOTES
"Hospital Medicine Daily Progress Note    Date of Service  5/26/2023    Chief Complaint  Mary Martinez is a 53 y.o. female admitted 5/25/2023 with confusion and failure to thrive    Hospital Course    Mary Martinez is a 53 y.o. female who presented 5/25/2023 with altered mental status.  Her son reported that patient had been having increasing confusion for the last few days/ He reported that she had similar symptoms in the past when she had UTI.  Patient reported that she had been extremely fatigued weak and unable to eat for the last 3 weeks.  She does have chronic abdominal pain and it was reportedly at her baseline. She also reported acute on chronic nausea and vomiting. For the last week, she had been unable to get up and walk around, typically uses a walker.  She denied any dysuria but did report urinary urgency.  Patient reported that she had been trying to get a feeding tube for the last 6 months.  On chart review patient was previously evaluated by surgery for feeding tube placement however she refused surgery at that time.      In the ER vital signs stable.  Labs significant for hemoglobin 9.8, hematocrit 29, sodium 133, potassium 2, CO2 5, anion gap 20, creatinine 1.18, GFR 56, calcium 8.2, albumin 3 with positive UA.      Interval Problem Update  Axox3, currently good historian, she reports chronic abdominal pain \"at baseline\". 3/10, ongoing nausea with intermittent dry heaving but tolerating pills and some clears. NO cheest pain, bp stable, ros otherwise negative.    I have discussed this patient's plan of care and discharge plan at IDT rounds today with Case Management, Nursing, Nursing leadership, and other members of the IDT team.    Consultants/Specialty  GI  Surgery    Code Status  DNAR/DNI    Disposition    I have placed the appropriate orders for post-discharge needs.    Review of Systems  Review of Systems   Constitutional:  Positive for malaise/fatigue and weight loss. Negative " for chills, diaphoresis and fever.   HENT: Negative.  Negative for sore throat.    Eyes: Negative.  Negative for blurred vision.   Respiratory: Negative.  Negative for cough and shortness of breath.    Cardiovascular: Negative.  Negative for chest pain, palpitations and leg swelling.   Gastrointestinal:  Positive for abdominal pain, nausea and vomiting.   Genitourinary: Negative.  Negative for dysuria.   Musculoskeletal: Negative.  Negative for myalgias.   Skin: Negative.  Negative for itching and rash.   Neurological: Negative.  Negative for dizziness, focal weakness, weakness and headaches.   Endo/Heme/Allergies: Negative.  Does not bruise/bleed easily.   Psychiatric/Behavioral: Negative.  Negative for depression, substance abuse and suicidal ideas.    All other systems reviewed and are negative.       Physical Exam  Temp:  [36.2 °C (97.2 °F)-37.3 °C (99.1 °F)] 36.9 °C (98.4 °F)  Pulse:  [] 93  Resp:  [15-17] 16  BP: ()/(60-68) 98/62  SpO2:  [94 %-98 %] 98 %    Physical Exam  Vitals and nursing note reviewed. Exam conducted with a chaperone present.   Constitutional:       General: She is not in acute distress.     Appearance: She is underweight. She is ill-appearing. She is not diaphoretic.   HENT:      Head: Normocephalic.      Nose: Nose normal.      Mouth/Throat:      Mouth: Mucous membranes are moist.   Eyes:      Pupils: Pupils are equal, round, and reactive to light.   Cardiovascular:      Rate and Rhythm: Normal rate and regular rhythm.      Pulses: Normal pulses.      Heart sounds: Normal heart sounds.   Pulmonary:      Effort: Pulmonary effort is normal.      Breath sounds: Normal breath sounds.   Abdominal:      General: Abdomen is flat. Bowel sounds are normal.      Palpations: Abdomen is soft.      Tenderness: There is abdominal tenderness (mild diffuse).   Musculoskeletal:         General: No swelling or deformity. Normal range of motion.   Skin:     General: Skin is warm and dry.       Capillary Refill: Capillary refill takes less than 2 seconds.   Neurological:      General: No focal deficit present.      Mental Status: She is alert and oriented to person, place, and time.      Cranial Nerves: No cranial nerve deficit.   Psychiatric:         Mood and Affect: Mood normal.         Behavior: Behavior normal.         Fluids    Intake/Output Summary (Last 24 hours) at 5/26/2023 1428  Last data filed at 5/26/2023 0615  Gross per 24 hour   Intake 610.04 ml   Output 200 ml   Net 410.04 ml       Laboratory  Recent Labs     05/25/23  1148 05/26/23  0129   WBC 7.5 8.4   RBC 3.35* 3.12*   HEMOGLOBIN 9.8* 9.2*   HEMATOCRIT 29.2* 27.6*   MCV 87.2 88.5   MCH 29.3 29.5   MCHC 33.6 33.3   RDW 62.9* 64.1*   PLATELETCT 354 370   MPV 10.1 10.1     Recent Labs     05/25/23  1335 05/26/23  0129 05/26/23  0748   SODIUM 132* 137 138   POTASSIUM 2.4* 2.4* 2.7*   CHLORIDE 109 115* 116*   CO2 5* 5* 9*   GLUCOSE 104* 140* 174*   BUN 32* 23* 19   CREATININE 1.04 0.88 0.94   CALCIUM 7.2* 7.8* 7.8*                   Imaging  DX-ABDOMEN FOR TUBE PLACEMENT    (Results Pending)        Assessment/Plan  * High anion gap metabolic acidosis- (present on admission)  Assessment & Plan  Due to starvation ketosis  Lactic acid negative  Slowly improving  i will continue to follow very closely, serial bmp      UTI (urinary tract infection)- (present on admission)  Assessment & Plan  With urinary urgency   Continue rocephin  Urine culture so far negative.     Thrush- (present on admission)  Assessment & Plan  Will screen for hiv  Continue nystatin  Speech to follow    Failure to thrive in adult- (present on admission)  Assessment & Plan  Severe malnutrition and muscle wasting, cachexia  Nutrition consulted  Aggressive IV fluid hydration with correction of electrolytes  Discussed with surgery and GI  Patient refusing ngt but states she may re consider  GI planning surgical tube on Mon    Hypocalcemia- (present on admission)  Assessment &  Plan  Replacing with tums  following    Bipolar 1 disorder ([facility])- (present on admission)  Assessment & Plan  Continue Paxil and Seroquel    Starvation ketoacidosis- (present on admission)  Assessment & Plan  Improving  following    Severe malnutrition ([facility])  Assessment & Plan  See above  Very high risk for re feeding syndrome  Following very closely, serial electrolytes    SMAS (superior mesenteric artery syndrome) c/b feeding issues ([facility])- (present on admission)  Assessment & Plan  Contributing to severe malnutrition, chronic abdominal pain    Unintentional weight loss- (present on admission)  Assessment & Plan  Related to SMA, will eval for contributing factors  Screen for hiv, will check lipase  Patient unsure about cancer screening  Malnutrition contributing    Chronic pain- (present on admission)  Assessment & Plan  Patient on home oxycodone  Pain management  Continue Paxil    Hypophosphatemia- (present on admission)  Assessment & Plan  Replacing and following    HASMUKH (acute kidney injury) ([facility])- (present on admission)  Assessment & Plan  Due to dehydration  following  Avoid nephrotoxic medications      Hypokalemia- (present on admission)  Assessment & Plan  Severe, potassium 2 on admission  Slowly improving  Mag currently stable, will follow as intracellular shift expected  Very high risk for arrhythmia - will continue tele  Serial cbc    Rheumatoid arthritis ([facility])- (present on admission)  Assessment & Plan  With chronic deformity and s/p amputations  No evidence of acute flair  following    Tobacco dependence- (present on admission)  Assessment & Plan  Cessation discussed and recommended > 3 min    Pancreatitis- (present on admission)  Assessment & Plan  H/o  Ongoing abdominal pain, I will check a lipase  following    Hyponatremia- (present on admission)  Assessment & Plan  Sodium 133 on admission  Likely due to severe dehydration  Now resolved  Will continue to follow         VTE prophylaxis: enoxaparin  ppx    I have performed a physical exam and reviewed and updated ROS and Plan today (5/26/2023). In review of yesterday's note (5/25/2023), there are no changes except as documented above.

## 2023-05-26 NOTE — PROGRESS NOTES
NOC HOSPITALIST CROSS COVER    Notified by RN of potassium level of 2.4.      #Hypokalemia  -Potassium chloride 10 mEq IVPB x 4 secondary to active vomiting  -Add on phos and mag levels        ________________________________________________________________________________________  Electronically signed by:  ROSSI Powers, AGACNP-BC

## 2023-05-26 NOTE — THERAPY
Speech Language Pathology   Clinical Swallow Evaluation     Patient Name: Mary Martinez  AGE:  53 y.o., SEX:  female  Medical Record #: [id number]  Date of Service: 5/26/2023      History of Present Illness  53 y.o. woman admitted on 5/25/23 for ALOC, found to have high anion gap metabolic acidosis r/t starvation ketoacidosis, UTI, HASMUKH, and FTT. PMH notable for hiatus hernia syndrome, pancreatitis, intractable N/V, SMAS, asthma, GERD, mental illness.     No relevant imaging from this admission to review.     2/17/23 CXR: No acute cardiopulmonary disease.      General Information:  Vitals  O2 Delivery Device: None - Room Air  Level of Consciousness: Alert  Patient Behaviors:  (Cooperative)  Orientation: Oriented x 4  Follows Directives: Yes      Prior Living Situation & Level of Function:  Communication: WFL  Swallowing: Last seen by service in 8/2022 and recommended RG7/TN0. Esophageal dysphagia hx with frequent vomiting and severely reduced PO intake.       Oral Mechanism Evaluation:  Dentition: Fair, Natural dentition, Scattered dentition   Facial Symmetry: Equal  Facial Sensation: Equal     Labial Observations: WFL   Lingual Observations: Midline  Motor Speech: WFL         Laryngeal Function:  Secretion Management: Adequate  Voice Quality: Hoarse, Harsh (mild)  Cough: Perceptually weak       Subjective  Discussed wtih MD - patient clear to advance diet per speech. Patient reports abodminal pain with PO. Patient reports ability to consume a regular diet from an oral phase perspective; however, N/V impedes such.      Assessment  Current Method of Nutrition: Oral diet (clear liquid)  Positioning: Semi-Arambula's (30-45 degrees)  Bolus Administration: Patient    O2 Delivery Device: None - Room Air  Factor(s) Affecting Performance: None  Tracheostomy : No       Swallowing Trials:  Thin Liquid (TN0): WFL  Liquidised (LQ3): WFL  Regular (RG7): Impaired (oral phase)      Comments: Adequate oral bolus  "acceptance/containment. Complete AP transfer without notable oral bolus residue. Adequate bite with prolonged mastication of solids. No cough/throat clear appreciated with PO. Vocal quality remained stable throughout PO intake. Single swallow completed per bolus. Reported globus sensation in chest as well as pain. Patient adequately self-feeding with appropriate rate and volume of intake. Discussed IDDSI levels; patient elected full liquid. Provided education regarding general aspiration precautions as well as signs of aspiration. All questions addressed.        Clinical Impressions  Patient presents with signs of esophageal dysphagia. No clinical indicators of airway invasion nor pharyngeal inefficiency this date. Mild dysphonia with rough, hoarse vocal quality, worsened over past months per patient. Patient is at risk for ascending aspiration, thus service will follow up to monitor pulmonary status and provided education.     Recommendations  Diet Consistency: Full liquid, per patient  Instrumentation: Instrumental swallow study pending clinical progress  Medication: Whole with puree, Crush with pudding/puree, as appropriate  Supervision: Independent  Positioning: Fully upright and midline during oral intake, Remain upright for 45 minutes after oral intake  Risk Management : Small bites/sips, Slow rate of intake, Alternate bites and sips  Oral Care: Q6h         SLP Treatment Plan  Treatment Plan: Dysphagia Treatment  SLP Frequency: 2x Per Week  Estimated Duration: Until Therapy Goals Met      Anticipated Discharge Needs  Discharge Recommendations: Recommend home health for continued speech therapy services   Therapy Recommendations Upon DC: Dysphagia Training        Patient / Family Goals  Patient / Family Goal #1: \"I can swallow fine\"  Short Term Goals  Short Term Goal # 1: Patient will consume a diet with thin liquids without overt indicators of aspiration or decline in pulmonary status.      Velvet Lee, " SLP

## 2023-05-27 ENCOUNTER — APPOINTMENT (OUTPATIENT)
Dept: RADIOLOGY | Facility: MEDICAL CENTER | Age: 53
DRG: 682 | End: 2023-05-27
Payer: MEDICAID

## 2023-05-27 PROBLEM — D64.9 ANEMIA: Status: ACTIVE | Noted: 2023-02-19

## 2023-05-27 LAB
ANION GAP SERPL CALC-SCNC: 11 MMOL/L (ref 7–16)
ANION GAP SERPL CALC-SCNC: 12 MMOL/L (ref 7–16)
ANION GAP SERPL CALC-SCNC: 12 MMOL/L (ref 7–16)
ANISOCYTOSIS BLD QL SMEAR: ABNORMAL
BACTERIA UR CULT: ABNORMAL
BACTERIA UR CULT: ABNORMAL
BASOPHILS # BLD AUTO: 0 % (ref 0–1.8)
BASOPHILS # BLD: 0 K/UL (ref 0–0.12)
BUN SERPL-MCNC: 10 MG/DL (ref 8–22)
BUN SERPL-MCNC: 8 MG/DL (ref 8–22)
BUN SERPL-MCNC: 8 MG/DL (ref 8–22)
CA-I SERPL-SCNC: 1 MMOL/L (ref 1.1–1.3)
CALCIUM SERPL-MCNC: 6.9 MG/DL (ref 8.5–10.5)
CALCIUM SERPL-MCNC: 7.3 MG/DL (ref 8.5–10.5)
CALCIUM SERPL-MCNC: 7.4 MG/DL (ref 8.5–10.5)
CHLORIDE SERPL-SCNC: 110 MMOL/L (ref 96–112)
CHLORIDE SERPL-SCNC: 114 MMOL/L (ref 96–112)
CHLORIDE SERPL-SCNC: 114 MMOL/L (ref 96–112)
CO2 SERPL-SCNC: 15 MMOL/L (ref 20–33)
CO2 SERPL-SCNC: 16 MMOL/L (ref 20–33)
CO2 SERPL-SCNC: 17 MMOL/L (ref 20–33)
CREAT SERPL-MCNC: 0.61 MG/DL (ref 0.5–1.4)
CREAT SERPL-MCNC: 0.67 MG/DL (ref 0.5–1.4)
CREAT SERPL-MCNC: 0.72 MG/DL (ref 0.5–1.4)
CRP SERPL HS-MCNC: 4.82 MG/DL (ref 0–0.75)
EOSINOPHIL # BLD AUTO: 0.07 K/UL (ref 0–0.51)
EOSINOPHIL NFR BLD: 1 % (ref 0–6.9)
ERYTHROCYTE [DISTWIDTH] IN BLOOD BY AUTOMATED COUNT: 66.9 FL (ref 35.9–50)
GFR SERPLBLD CREATININE-BSD FMLA CKD-EPI: 101 ML/MIN/1.73 M 2
GFR SERPLBLD CREATININE-BSD FMLA CKD-EPI: 106 ML/MIN/1.73 M 2
GFR SERPLBLD CREATININE-BSD FMLA CKD-EPI: 108 ML/MIN/1.73 M 2
GLUCOSE SERPL-MCNC: 105 MG/DL (ref 65–99)
GLUCOSE SERPL-MCNC: 116 MG/DL (ref 65–99)
GLUCOSE SERPL-MCNC: 124 MG/DL (ref 65–99)
HCT VFR BLD AUTO: 23.4 % (ref 37–47)
HGB BLD-MCNC: 7.6 G/DL (ref 12–16)
HIV 1+2 AB+HIV1 P24 AG SERPL QL IA: NORMAL
LYMPHOCYTES # BLD AUTO: 1.35 K/UL (ref 1–4.8)
LYMPHOCYTES NFR BLD: 19 % (ref 22–41)
MAGNESIUM SERPL-MCNC: 1.6 MG/DL (ref 1.5–2.5)
MAGNESIUM SERPL-MCNC: 1.7 MG/DL (ref 1.5–2.5)
MAGNESIUM SERPL-MCNC: 1.8 MG/DL (ref 1.5–2.5)
MAGNESIUM SERPL-MCNC: 1.9 MG/DL (ref 1.5–2.5)
MANUAL DIFF BLD: NORMAL
MCH RBC QN AUTO: 28.7 PG (ref 27–33)
MCHC RBC AUTO-ENTMCNC: 32.5 G/DL (ref 32.2–35.5)
MCV RBC AUTO: 88.3 FL (ref 81.4–97.8)
MICROCYTES BLD QL SMEAR: ABNORMAL
MONOCYTES # BLD AUTO: 0.21 K/UL (ref 0–0.85)
MONOCYTES NFR BLD AUTO: 3 % (ref 0–13.4)
MORPHOLOGY BLD-IMP: NORMAL
NEUTROPHILS # BLD AUTO: 5.47 K/UL (ref 1.82–7.42)
NEUTROPHILS NFR BLD: 77 % (ref 44–72)
NRBC # BLD AUTO: 0.02 K/UL
NRBC BLD-RTO: 0.3 /100 WBC (ref 0–0.2)
PHOSPHATE SERPL-MCNC: 1.5 MG/DL (ref 2.5–4.5)
PHOSPHATE SERPL-MCNC: 1.6 MG/DL (ref 2.5–4.5)
PHOSPHATE SERPL-MCNC: 1.6 MG/DL (ref 2.5–4.5)
PHOSPHATE SERPL-MCNC: 2 MG/DL (ref 2.5–4.5)
PLATELET # BLD AUTO: 297 K/UL (ref 164–446)
PLATELET BLD QL SMEAR: NORMAL
PMV BLD AUTO: 10.4 FL (ref 9–12.9)
POTASSIUM SERPL-SCNC: 2.9 MMOL/L (ref 3.6–5.5)
POTASSIUM SERPL-SCNC: 3.1 MMOL/L (ref 3.6–5.5)
POTASSIUM SERPL-SCNC: 3.5 MMOL/L (ref 3.6–5.5)
PREALB SERPL-MCNC: 10.5 MG/DL (ref 18–38)
RBC # BLD AUTO: 2.65 M/UL (ref 4.2–5.4)
RBC BLD AUTO: PRESENT
SIGNIFICANT IND 70042: ABNORMAL
SITE SITE: ABNORMAL
SODIUM SERPL-SCNC: 139 MMOL/L (ref 135–145)
SODIUM SERPL-SCNC: 140 MMOL/L (ref 135–145)
SODIUM SERPL-SCNC: 142 MMOL/L (ref 135–145)
SOURCE SOURCE: ABNORMAL
WBC # BLD AUTO: 7.1 K/UL (ref 4.8–10.8)

## 2023-05-27 PROCEDURE — 84134 ASSAY OF PREALBUMIN: CPT

## 2023-05-27 PROCEDURE — 700102 HCHG RX REV CODE 250 W/ 637 OVERRIDE(OP): Performed by: EMERGENCY MEDICINE

## 2023-05-27 PROCEDURE — 86140 C-REACTIVE PROTEIN: CPT

## 2023-05-27 PROCEDURE — 82330 ASSAY OF CALCIUM: CPT

## 2023-05-27 PROCEDURE — 700111 HCHG RX REV CODE 636 W/ 250 OVERRIDE (IP): Performed by: HOSPITALIST

## 2023-05-27 PROCEDURE — 80048 BASIC METABOLIC PNL TOTAL CA: CPT | Mod: 91

## 2023-05-27 PROCEDURE — 87389 HIV-1 AG W/HIV-1&-2 AB AG IA: CPT

## 2023-05-27 PROCEDURE — 84100 ASSAY OF PHOSPHORUS: CPT

## 2023-05-27 PROCEDURE — 85025 COMPLETE CBC W/AUTO DIFF WBC: CPT

## 2023-05-27 PROCEDURE — 97162 PT EVAL MOD COMPLEX 30 MIN: CPT

## 2023-05-27 PROCEDURE — 83735 ASSAY OF MAGNESIUM: CPT | Mod: 91

## 2023-05-27 PROCEDURE — 700111 HCHG RX REV CODE 636 W/ 250 OVERRIDE (IP): Performed by: NURSE PRACTITIONER

## 2023-05-27 PROCEDURE — 85007 BL SMEAR W/DIFF WBC COUNT: CPT

## 2023-05-27 PROCEDURE — 770020 HCHG ROOM/CARE - TELE (206)

## 2023-05-27 PROCEDURE — 700105 HCHG RX REV CODE 258: Performed by: HOSPITALIST

## 2023-05-27 PROCEDURE — A9270 NON-COVERED ITEM OR SERVICE: HCPCS | Performed by: HOSPITALIST

## 2023-05-27 PROCEDURE — 700102 HCHG RX REV CODE 250 W/ 637 OVERRIDE(OP)

## 2023-05-27 PROCEDURE — 700111 HCHG RX REV CODE 636 W/ 250 OVERRIDE (IP): Performed by: INTERNAL MEDICINE

## 2023-05-27 PROCEDURE — 36415 COLL VENOUS BLD VENIPUNCTURE: CPT

## 2023-05-27 PROCEDURE — 700105 HCHG RX REV CODE 258: Performed by: NURSE PRACTITIONER

## 2023-05-27 PROCEDURE — A9270 NON-COVERED ITEM OR SERVICE: HCPCS

## 2023-05-27 PROCEDURE — 700101 HCHG RX REV CODE 250: Performed by: NURSE PRACTITIONER

## 2023-05-27 PROCEDURE — 700102 HCHG RX REV CODE 250 W/ 637 OVERRIDE(OP): Performed by: HOSPITALIST

## 2023-05-27 PROCEDURE — A9270 NON-COVERED ITEM OR SERVICE: HCPCS | Performed by: EMERGENCY MEDICINE

## 2023-05-27 PROCEDURE — 99232 SBSQ HOSP IP/OBS MODERATE 35: CPT | Performed by: HOSPITALIST

## 2023-05-27 RX ORDER — POTASSIUM CHLORIDE 7.45 MG/ML
10 INJECTION INTRAVENOUS
Status: COMPLETED | OUTPATIENT
Start: 2023-05-27 | End: 2023-05-27

## 2023-05-27 RX ORDER — CALCIUM GLUCONATE 20 MG/ML
1 INJECTION, SOLUTION INTRAVENOUS ONCE
Status: COMPLETED | OUTPATIENT
Start: 2023-05-27 | End: 2023-05-28

## 2023-05-27 RX ORDER — POTASSIUM CHLORIDE 20 MEQ/1
40 TABLET, EXTENDED RELEASE ORAL ONCE
Status: COMPLETED | OUTPATIENT
Start: 2023-05-27 | End: 2023-05-27

## 2023-05-27 RX ADMIN — NYSTATIN 500000 UNITS: 100000 SUSPENSION ORAL at 09:23

## 2023-05-27 RX ADMIN — POTASSIUM CHLORIDE: 149 INJECTION, SOLUTION, CONCENTRATE INTRAVENOUS at 06:49

## 2023-05-27 RX ADMIN — OXYCODONE HYDROCHLORIDE 10 MG: 10 TABLET ORAL at 13:07

## 2023-05-27 RX ADMIN — HYDROMORPHONE HYDROCHLORIDE 0.25 MG: 1 INJECTION, SOLUTION INTRAMUSCULAR; INTRAVENOUS; SUBCUTANEOUS at 23:46

## 2023-05-27 RX ADMIN — DIBASIC SODIUM PHOSPHATE, MONOBASIC POTASSIUM PHOSPHATE AND MONOBASIC SODIUM PHOSPHATE 250 MG: 852; 155; 130 TABLET ORAL at 17:59

## 2023-05-27 RX ADMIN — PAROXETINE HYDROCHLORIDE 60 MG: 20 TABLET, FILM COATED ORAL at 17:59

## 2023-05-27 RX ADMIN — OMEPRAZOLE AND SODIUM BICARBONATE 40 MG: KIT at 17:58

## 2023-05-27 RX ADMIN — LIDOCAINE HYDROCHLORIDE 5 ML: 20 SOLUTION ORAL; TOPICAL at 13:09

## 2023-05-27 RX ADMIN — POTASSIUM CHLORIDE 10 MEQ: 7.46 INJECTION, SOLUTION INTRAVENOUS at 16:46

## 2023-05-27 RX ADMIN — LIDOCAINE HYDROCHLORIDE 5 ML: 20 SOLUTION ORAL; TOPICAL at 20:57

## 2023-05-27 RX ADMIN — POTASSIUM CHLORIDE 10 MEQ: 7.46 INJECTION, SOLUTION INTRAVENOUS at 00:39

## 2023-05-27 RX ADMIN — HYDROMORPHONE HYDROCHLORIDE 0.25 MG: 1 INJECTION, SOLUTION INTRAMUSCULAR; INTRAVENOUS; SUBCUTANEOUS at 15:14

## 2023-05-27 RX ADMIN — ONDANSETRON 4 MG: 2 INJECTION INTRAMUSCULAR; INTRAVENOUS at 06:22

## 2023-05-27 RX ADMIN — HYDROMORPHONE HYDROCHLORIDE 0.25 MG: 1 INJECTION, SOLUTION INTRAMUSCULAR; INTRAVENOUS; SUBCUTANEOUS at 09:22

## 2023-05-27 RX ADMIN — POTASSIUM CHLORIDE 10 MEQ: 7.46 INJECTION, SOLUTION INTRAVENOUS at 15:14

## 2023-05-27 RX ADMIN — OXYCODONE HYDROCHLORIDE 10 MG: 10 TABLET ORAL at 00:46

## 2023-05-27 RX ADMIN — POTASSIUM CHLORIDE 10 MEQ: 7.46 INJECTION, SOLUTION INTRAVENOUS at 02:52

## 2023-05-27 RX ADMIN — NYSTATIN 500000 UNITS: 100000 SUSPENSION ORAL at 12:49

## 2023-05-27 RX ADMIN — ANTACID TABLETS 500 MG: 500 TABLET, CHEWABLE ORAL at 12:51

## 2023-05-27 RX ADMIN — OXYCODONE HYDROCHLORIDE 10 MG: 10 TABLET ORAL at 06:22

## 2023-05-27 RX ADMIN — NYSTATIN 500000 UNITS: 100000 SUSPENSION ORAL at 20:57

## 2023-05-27 RX ADMIN — ANTACID TABLETS 500 MG: 500 TABLET, CHEWABLE ORAL at 06:16

## 2023-05-27 RX ADMIN — POTASSIUM CHLORIDE 40 MEQ: 1500 TABLET, EXTENDED RELEASE ORAL at 22:53

## 2023-05-27 RX ADMIN — POTASSIUM CHLORIDE 10 MEQ: 7.46 INJECTION, SOLUTION INTRAVENOUS at 02:06

## 2023-05-27 RX ADMIN — POTASSIUM PHOSPHATE, MONOBASIC AND POTASSIUM PHOSPHATE, DIBASIC 15 MMOL: 224; 236 INJECTION, SOLUTION, CONCENTRATE INTRAVENOUS at 00:43

## 2023-05-27 RX ADMIN — ONDANSETRON 4 MG: 4 TABLET, ORALLY DISINTEGRATING ORAL at 18:08

## 2023-05-27 RX ADMIN — ENOXAPARIN SODIUM 40 MG: 100 INJECTION SUBCUTANEOUS at 17:59

## 2023-05-27 RX ADMIN — DIBASIC SODIUM PHOSPHATE, MONOBASIC POTASSIUM PHOSPHATE AND MONOBASIC SODIUM PHOSPHATE 250 MG: 852; 155; 130 TABLET ORAL at 12:52

## 2023-05-27 RX ADMIN — Medication 100 MG: at 06:16

## 2023-05-27 RX ADMIN — ANTACID TABLETS 500 MG: 500 TABLET, CHEWABLE ORAL at 17:59

## 2023-05-27 RX ADMIN — OXYCODONE HYDROCHLORIDE 10 MG: 10 TABLET ORAL at 21:03

## 2023-05-27 RX ADMIN — NYSTATIN 500000 UNITS: 100000 SUSPENSION ORAL at 17:58

## 2023-05-27 RX ADMIN — DIBASIC SODIUM PHOSPHATE, MONOBASIC POTASSIUM PHOSPHATE AND MONOBASIC SODIUM PHOSPHATE 250 MG: 852; 155; 130 TABLET ORAL at 06:16

## 2023-05-27 RX ADMIN — OXYCODONE HYDROCHLORIDE 10 MG: 10 TABLET ORAL at 17:59

## 2023-05-27 RX ADMIN — CEFTRIAXONE SODIUM 1000 MG: 10 INJECTION, POWDER, FOR SOLUTION INTRAVENOUS at 18:21

## 2023-05-27 RX ADMIN — HYDROMORPHONE HYDROCHLORIDE 0.25 MG: 1 INJECTION, SOLUTION INTRAMUSCULAR; INTRAVENOUS; SUBCUTANEOUS at 02:57

## 2023-05-27 RX ADMIN — POTASSIUM CHLORIDE: 149 INJECTION, SOLUTION, CONCENTRATE INTRAVENOUS at 15:43

## 2023-05-27 RX ADMIN — DOCUSATE SODIUM 50 MG AND SENNOSIDES 8.6 MG 2 TABLET: 8.6; 5 TABLET, FILM COATED ORAL at 17:58

## 2023-05-27 RX ADMIN — QUETIAPINE FUMARATE 100 MG: 100 TABLET ORAL at 17:59

## 2023-05-27 ASSESSMENT — ENCOUNTER SYMPTOMS
FOCAL WEAKNESS: 0
DIZZINESS: 0
CHILLS: 0
HEADACHES: 0
PALPITATIONS: 0
SORE THROAT: 0
BRUISES/BLEEDS EASILY: 0
BLURRED VISION: 0
WEIGHT LOSS: 1
PSYCHIATRIC NEGATIVE: 1
RESPIRATORY NEGATIVE: 1
DEPRESSION: 0
WEAKNESS: 0
MUSCULOSKELETAL NEGATIVE: 1
EYES NEGATIVE: 1
MYALGIAS: 0
ABDOMINAL PAIN: 1
DIAPHORESIS: 0
NEUROLOGICAL NEGATIVE: 1
VOMITING: 0
SHORTNESS OF BREATH: 0
NAUSEA: 1
COUGH: 0
CARDIOVASCULAR NEGATIVE: 1
FEVER: 0

## 2023-05-27 ASSESSMENT — COGNITIVE AND FUNCTIONAL STATUS - GENERAL
SUGGESTED CMS G CODE MODIFIER MOBILITY: CL
WALKING IN HOSPITAL ROOM: A LOT
MOVING TO AND FROM BED TO CHAIR: UNABLE
MOBILITY SCORE: 10
CLIMB 3 TO 5 STEPS WITH RAILING: TOTAL
STANDING UP FROM CHAIR USING ARMS: A LITTLE
TURNING FROM BACK TO SIDE WHILE IN FLAT BAD: A LOT
MOVING FROM LYING ON BACK TO SITTING ON SIDE OF FLAT BED: UNABLE

## 2023-05-27 ASSESSMENT — FIBROSIS 4 INDEX: FIB4 SCORE: 0.3

## 2023-05-27 ASSESSMENT — LIFESTYLE VARIABLES: SUBSTANCE_ABUSE: 0

## 2023-05-27 ASSESSMENT — GAIT ASSESSMENTS: GAIT LEVEL OF ASSIST: UNABLE TO PARTICIPATE

## 2023-05-27 NOTE — CARE PLAN
The patient is Watcher - Medium risk of patient condition declining or worsening    Shift Goals  Clinical Goals: Tolerate feedings, hemodynamically stable  Patient Goals: pain mgmt  Family Goals: n/a    Progress made toward(s) clinical / shift goals:    Problem: Pain - Standard  Goal: Alleviation of pain or a reduction in pain to the patient’s comfort goal  Description: Target End Date:  Prior to discharge or change in level of care    Document on Vitals flowsheet    1.  Document pain using the appropriate pain scale per order or unit policy  2.  Educate and implement non-pharmacologic comfort measures (i.e. relaxation, distraction, massage, cold/heat therapy, etc.)  3.  Pain management medications as ordered  4.  Reassess pain after pain med administration per policy  5.  If opiods administered assess patient's response to pain medication is appropriate per POSS sedation scale  6.  Follow pain management plan developed in collaboration with patient and interdisciplinary team (including palliative care or pain specialists if applicable)  Outcome: Progressing     Problem: Knowledge Deficit - Standard  Goal: Patient and family/care givers will demonstrate understanding of plan of care, disease process/condition, diagnostic tests and medications  Description: Target End Date:  1-3 days or as soon as patient condition allows    Document in Patient Education    1.  Patient and family/caregiver oriented to unit, equipment, visitation policy and means for communicating concern  2.  Complete/review Learning Assessment  3.  Assess knowledge level of disease process/condition, treatment plan, diagnostic tests and medications  4.  Explain disease process/condition, treatment plan, diagnostic tests and medications  Outcome: Progressing     Problem: Fall Risk  Goal: Patient will remain free from falls  Description: Target End Date:  Prior to discharge or change in level of care    Document interventions on the [facility] Fall  Risk Assessment    1.  Assess for fall risk factors  2.  Implement fall precautions  Outcome: Progressing     Problem: Skin Integrity  Goal: Skin integrity is maintained or improved  Description: Target End Date:  Prior to discharge or change in level of care    Document interventions on Skin Risk/Zan flowsheet groups and corresponding LDA    1.  Assess and monitor skin integrity, appearance and/or temperature  2.  Assess risk factors for impaired skin integrity and/or pressures ulcers  3.  Implement precautions to protect skin integrity in collaboration with interdisciplinary team  4.  Implement pressure ulcer prevention protocol if at risk for skin breakdown  5.  Confirm wound care consult if at risk for skin breakdown  6.  Ensure patient use of pressure relieving devices  (Low air loss bed, waffle overlay, heel protectors, ROHO cushion, etc)  Outcome: Progressing       Patient is not progressing towards the following goals:

## 2023-05-27 NOTE — PROGRESS NOTES
HOSPITALIST NOC CROSS COVER    Notified of evening labs:   potassium 2.7  phos 1.0  Mag 1.9  pete 7.6  bicarb 12    Patient is currently scheduled on  K-phos 250 mg q 6 hr  Calcium carbonate 500 mg TID  Bicarb 8.4 with potassium 40 mEq 100ml/hr    A/P  #Electrolyte imbalance  - Add Potassium IV 40 meq x 1  - Add Potassium phosphate 15 mmol IV x 1  - Monitor labs closely as scheduled

## 2023-05-27 NOTE — PROGRESS NOTES
GI Interim Note:    Patient needs to be fed via Dobhoff to assess for refeeding syndrome prior to intervention. Surgery following for consideration of J-tube after electrolyte correction and initiation of nutrition  Dr. Greenberg contacted Dr. Quintana so please refer to surgery progress note.    PEG-J from GI team not indicated at this time.    GI team signs off.

## 2023-05-27 NOTE — THERAPY
"Physical Therapy   Initial Evaluation     Patient Name: Mary Martinez  Age:  53 y.o., Sex:  female  Medical Record #: [id number]  Today's Date: 5/27/2023    Precautions: Fall Risk;Swallow Precautions;Nasogastric Tube    Assessment  Patient is a 53 y.o. female admitted with AMS, FTT, UTI. Pt seen for PT evaluation at this time. Pt motivated to gain strength and participate but does express fear of falling and becomes tremulous with standing activity. Pt required min A for supine > sit, able to maintain sitting balance and complete BLE AROM. Pt required mod A for STS and min A for SPT with HHA. Encouraged up to chair with nursing to promote incr activity tolerance. Currently recommend placement. PT will follow.     Plan  Treatment Plan : Bed Mobility, Gait Training, Neuro Re-Education / Balance, Self Care / Home Evaluation, Stair Training, Therapeutic Activities, Therapeutic Exercise  Treatment Frequency: 4 Times per Week  Duration: Until Therapy Goals Met    DC Equipment Recommendations: Unable to determine at this time  Discharge Recommendations: Recommend post-acute placement for additional physical therapy services prior to discharge home      05/27/23 0846   Prior Living Situation   Prior Services None   Housing / Facility [address] [facility]   Steps Into Home 0   Bathroom Set up Bathtub / Shower Combination;Grab Bars   Equipment Owned Single Point Cane;4-Wheel Walker   Lives with -  Adult Children;Child Less than 18 Years of Age   Comments 17yo dtr, 13yo son, 20yo son with Down Syndrome, kids can assist   Prior Level of Functional Mobility   Bed Mobility Independent   Transfer Status Independent   Ambulation Independent   Ambulation Distance limited community   Assistive Devices Used 4-Wheel Walker   Stairs Independent   Cognition    Level of Consciousness Alert   Comments motivated but very fearful of falling, states she's usually a \"mind over matter person\" but feels like she is having trouble thinking that way "   Balance Assessment   Sitting Balance (Static) Fair   Sitting Balance (Dynamic) Fair   Standing Balance (Static) Poor +   Standing Balance (Dynamic) Poor   Weight Shift Sitting Fair   Weight Shift Standing Fair   Comments standing with HHA   Bed Mobility    Supine to Sit Minimal Assist   Scooting Supervised   Gait Analysis   Gait Level Of Assist Unable to Participate   Comments becomes very tremulous once standing and reports fear, was able to pivot to chair   Functional Mobility   Sit to Stand Moderate Assist   Bed, Chair Transfer Minimal Assist   Transfer Method Stand Pivot   Mobility EOB > chair with HHA   Comments encouraged up to chair for meals, demos seated BLE AROM   Short Term Goals    Short Term Goal # 1 pt will perform supine <> sit without bed features with SPV in 6 visits to get in/out of bed at home   Short Term Goal # 2 pt will perform all functional xfrs with SPV in 6 visits for improved independence   Short Term Goal # 3 pt will ambulate 50ft with FWW and SPV in 6 visits for improved independence   Short Term Goal # 4 pt will negotiate 1 step with SPV in 6 visits to access home

## 2023-05-27 NOTE — PROGRESS NOTES
"Hospital Medicine Daily Progress Note    Date of Service  5/27/2023    Chief Complaint  Mary Martinez is a 53 y.o. female admitted 5/25/2023 with confusion and failure to thrive    Hospital Course    Mary Martinez is a 53 y.o. female who presented 5/25/2023 with altered mental status.  Her son reported that patient had been having increasing confusion for the last few days/ He reported that she had similar symptoms in the past when she had UTI.  Patient reported that she had been extremely fatigued weak and unable to eat for the last 3 weeks.  She does have chronic abdominal pain and it was reportedly at her baseline. She also reported acute on chronic nausea and vomiting. For the last week, she had been unable to get up and walk around, typically uses a walker.  She denied any dysuria but did report urinary urgency.  Patient reported that she had been trying to get a feeding tube for the last 6 months.  On chart review patient was previously evaluated by surgery for feeding tube placement however she refused surgery at that time.      In the ER vital signs stable.  Labs significant for hemoglobin 9.8, hematocrit 29, sodium 133, potassium 2, CO2 5, anion gap 20, creatinine 1.18, GFR 56, calcium 8.2, albumin 3 with positive UA.      Interval Problem Update  Axox3, \"i'm feeling a little better\", OOB to chair, no emesis today, mild nausea, tolerating ngt and slow tube feeds. No confusion, electrolytes improving. Discussed with GI, they will sign off as surgery now planning to do J tube likely next week. Minimal hypotension, no dizziness, no melena or hematochezia.  ROS otherwise negative.     I have discussed this patient's plan of care and discharge plan at IDT rounds today with Case Management, Nursing, Nursing leadership, and other members of the IDT team.    Consultants/Specialty  GI  Surgery    Code Status  DNAR/DNI    Disposition    I have placed the appropriate orders for post-discharge " needs.    Review of Systems  Review of Systems   Constitutional:  Positive for malaise/fatigue and weight loss. Negative for chills, diaphoresis and fever.   HENT: Negative.  Negative for sore throat.    Eyes: Negative.  Negative for blurred vision.   Respiratory: Negative.  Negative for cough and shortness of breath.    Cardiovascular: Negative.  Negative for chest pain, palpitations and leg swelling.   Gastrointestinal:  Positive for abdominal pain and nausea. Negative for vomiting.   Genitourinary: Negative.  Negative for dysuria.   Musculoskeletal: Negative.  Negative for myalgias.   Skin: Negative.  Negative for itching and rash.   Neurological: Negative.  Negative for dizziness, focal weakness, weakness and headaches.   Endo/Heme/Allergies: Negative.  Does not bruise/bleed easily.   Psychiatric/Behavioral: Negative.  Negative for depression, substance abuse and suicidal ideas.    All other systems reviewed and are negative.       Physical Exam  Temp:  [36.6 °C (97.9 °F)-38.1 °C (100.6 °F)] 36.6 °C (97.9 °F)  Pulse:  [77-93] 78  Resp:  [14-18] 16  BP: (89-99)/(51-62) 97/57  SpO2:  [92 %-99 %] 95 %    Physical Exam  Vitals and nursing note reviewed. Exam conducted with a chaperone present.   Constitutional:       General: She is not in acute distress.     Appearance: She is cachectic. She is ill-appearing. She is not diaphoretic.   HENT:      Head: Normocephalic.      Nose: Nose normal.      Mouth/Throat:      Mouth: Mucous membranes are moist.   Eyes:      Pupils: Pupils are equal, round, and reactive to light.   Cardiovascular:      Rate and Rhythm: Normal rate and regular rhythm.      Pulses: Normal pulses.      Heart sounds: Normal heart sounds.   Pulmonary:      Effort: Pulmonary effort is normal.      Breath sounds: Normal breath sounds.   Abdominal:      General: Abdomen is flat. Bowel sounds are normal.      Palpations: Abdomen is soft.      Tenderness: There is no abdominal tenderness (mild diffuse).    Musculoskeletal:         General: No swelling or deformity. Normal range of motion.   Skin:     General: Skin is warm and dry.      Capillary Refill: Capillary refill takes less than 2 seconds.   Neurological:      General: No focal deficit present.      Mental Status: She is alert and oriented to person, place, and time.      Cranial Nerves: No cranial nerve deficit.   Psychiatric:         Mood and Affect: Mood normal.         Behavior: Behavior normal.         Fluids    Intake/Output Summary (Last 24 hours) at 5/27/2023 1331  Last data filed at 5/27/2023 1137  Gross per 24 hour   Intake 2768.33 ml   Output 300 ml   Net 2468.33 ml       Laboratory  Recent Labs     05/25/23  1148 05/26/23  0129 05/27/23  0138   WBC 7.5 8.4 7.1   RBC 3.35* 3.12* 2.65*   HEMOGLOBIN 9.8* 9.2* 7.6*   HEMATOCRIT 29.2* 27.6* 23.4*   MCV 87.2 88.5 88.3   MCH 29.3 29.5 28.7   MCHC 33.6 33.3 32.5   RDW 62.9* 64.1* 66.9*   PLATELETCT 354 370 297   MPV 10.1 10.1 10.4     Recent Labs     05/26/23  2055 05/27/23  0512 05/27/23  1215   SODIUM 140 140 142   POTASSIUM 2.7* 3.5* 3.1*   CHLORIDE 116* 114* 114*   CO2 12* 15* 16*   GLUCOSE 136* 116* 124*   BUN 13 10 8   CREATININE 0.78 0.72 0.67   CALCIUM 7.6* 7.3* 7.4*                   Imaging  DX-ABDOMEN FOR TUBE PLACEMENT   Final Result      Nasogastric tube is now satisfactorily with the stomach.      DX-ABDOMEN FOR TUBE PLACEMENT   Final Result      Nasogastric tube side-port is in the distal esophagus. Recommend advancement.           Assessment/Plan  * High anion gap metabolic acidosis- (present on admission)  Assessment & Plan  Due to starvation ketosis  Lactic acid negative  Much improved  I will continue to follow very closely and continue to check serial bmp        UTI (urinary tract infection)- (present on admission)  Assessment & Plan  With urinary urgency   Continue rocephin  Urine culture so far negative.     Thrush- (present on admission)  Assessment & Plan  Hiv negative  Continue  nystatin  Speech to follow    Failure to thrive in adult- (present on admission)  Assessment & Plan  Severe malnutrition and muscle wasting, cachexia  Nutrition consulted  Aggressive IV fluid hydration with correction of electrolytes  Discussed with surgery and GI  GI will sign off, surgery planning J tube once no s/o re feeding syndrome    Hypocalcemia- (present on admission)  Assessment & Plan  Replacing with tums  following    Anemia  Assessment & Plan  Acute on chronic  Currently hemodilution and frequent blood draws contributing - trying to limit  Following  No melena or hematochezia    Bipolar 1 disorder (HCC)- (present on admission)  Assessment & Plan  Continue Paxil and Seroquel    Starvation ketoacidosis- (present on admission)  Assessment & Plan  Improving  following    Severe malnutrition ([facility])  Assessment & Plan  See above  Very high risk for re feeding syndrome  Continue to follow very closely, serial electrolytes  Discussed with nutritionist and GI    SMAS (superior mesenteric artery syndrome) c/b feeding issues ([facility])- (present on admission)  Assessment & Plan  Contributing to severe malnutrition, chronic abdominal pain    Unintentional weight loss- (present on admission)  Assessment & Plan  Related to SMA and inability to keep food down, see above, addressing  Hiv and lipase negative  Malnutrition contributing    Chronic pain- (present on admission)  Assessment & Plan  Patient on home oxycodone  Pain management  Continue Paxil    Hypophosphatemia- (present on admission)  Assessment & Plan  Replacing  Resolving  Repeat in am  High risk for refeeding syndrome    HASMUKH (acute kidney injury) ([facility])- (present on admission)  Assessment & Plan  Resolved  Due to dehydration  following  Avoid nephrotoxic medications      Hypokalemia- (present on admission)  Assessment & Plan  Markedly improved  Will continue to follow  Serial bmp    Rheumatoid arthritis ([facility])- (present on admission)  Assessment & Plan  With  chronic deformity and s/p amputations  No evidence of acute flair  following    Tobacco dependence- (present on admission)  Assessment & Plan  Cessation discussed and recommended > 3 min    Pancreatitis- (present on admission)  Assessment & Plan  H/o  Ongoing abdominal pain improved  Lipase negative    Hyponatremia- (present on admission)  Assessment & Plan  Sodium 133 on admission  Likely due to severe dehydration  Now resolved  Will continue to follow         VTE prophylaxis: enoxaparin ppx    I have performed a physical exam and reviewed and updated ROS and Plan today (5/27/2023). In review of yesterday's note (5/26/2023), there are no changes except as documented above.

## 2023-05-27 NOTE — CARE PLAN
The patient is Stable - Low risk of patient condition declining or worsening    Shift Goals  Clinical Goals: ngt placement, pain management  Patient Goals: pain management, sleep  Family Goals: n/a    Progress made toward(s) clinical / shift goals:    Problem: Pain - Standard  Goal: Alleviation of pain or a reduction in pain to the patient’s comfort goal  Outcome: Progressing     Problem: Fall Risk  Goal: Patient will remain free from falls  Outcome: Progressing     Problem: Skin Integrity  Goal: Skin integrity is maintained or improved  Outcome: Progressing

## 2023-05-28 LAB
ALBUMIN SERPL BCP-MCNC: 2.3 G/DL (ref 3.2–4.9)
ALBUMIN/GLOB SERPL: 0.8 G/DL
ALP SERPL-CCNC: 127 U/L (ref 30–99)
ALT SERPL-CCNC: <5 U/L (ref 2–50)
ANION GAP SERPL CALC-SCNC: 11 MMOL/L (ref 7–16)
ANION GAP SERPL CALC-SCNC: 12 MMOL/L (ref 7–16)
AST SERPL-CCNC: 7 U/L (ref 12–45)
BASOPHILS # BLD AUTO: 0.5 % (ref 0–1.8)
BASOPHILS # BLD: 0.04 K/UL (ref 0–0.12)
BILIRUB SERPL-MCNC: 0.2 MG/DL (ref 0.1–1.5)
BUN SERPL-MCNC: 7 MG/DL (ref 8–22)
BUN SERPL-MCNC: 7 MG/DL (ref 8–22)
CALCIUM ALBUM COR SERPL-MCNC: 8.6 MG/DL (ref 8.5–10.5)
CALCIUM SERPL-MCNC: 7 MG/DL (ref 8.5–10.5)
CALCIUM SERPL-MCNC: 7.2 MG/DL (ref 8.5–10.5)
CHLORIDE SERPL-SCNC: 104 MMOL/L (ref 96–112)
CHLORIDE SERPL-SCNC: 108 MMOL/L (ref 96–112)
CO2 SERPL-SCNC: 19 MMOL/L (ref 20–33)
CO2 SERPL-SCNC: 23 MMOL/L (ref 20–33)
CREAT SERPL-MCNC: 0.54 MG/DL (ref 0.5–1.4)
CREAT SERPL-MCNC: 0.56 MG/DL (ref 0.5–1.4)
EOSINOPHIL # BLD AUTO: 0.12 K/UL (ref 0–0.51)
EOSINOPHIL NFR BLD: 1.5 % (ref 0–6.9)
ERYTHROCYTE [DISTWIDTH] IN BLOOD BY AUTOMATED COUNT: 65.7 FL (ref 35.9–50)
GFR SERPLBLD CREATININE-BSD FMLA CKD-EPI: 110 ML/MIN/1.73 M 2
GFR SERPLBLD CREATININE-BSD FMLA CKD-EPI: 111 ML/MIN/1.73 M 2
GLOBULIN SER CALC-MCNC: 2.9 G/DL (ref 1.9–3.5)
GLUCOSE SERPL-MCNC: 105 MG/DL (ref 65–99)
GLUCOSE SERPL-MCNC: 116 MG/DL (ref 65–99)
HCT VFR BLD AUTO: 23.6 % (ref 37–47)
HGB BLD-MCNC: 7.8 G/DL (ref 12–16)
IMM GRANULOCYTES # BLD AUTO: 0.1 K/UL (ref 0–0.11)
IMM GRANULOCYTES NFR BLD AUTO: 1.3 % (ref 0–0.9)
LYMPHOCYTES # BLD AUTO: 2.76 K/UL (ref 1–4.8)
LYMPHOCYTES NFR BLD: 35.2 % (ref 22–41)
MAGNESIUM SERPL-MCNC: 1.4 MG/DL (ref 1.5–2.5)
MAGNESIUM SERPL-MCNC: 1.4 MG/DL (ref 1.5–2.5)
MCH RBC QN AUTO: 28.9 PG (ref 27–33)
MCHC RBC AUTO-ENTMCNC: 33.1 G/DL (ref 32.2–35.5)
MCV RBC AUTO: 87.4 FL (ref 81.4–97.8)
MONOCYTES # BLD AUTO: 0.89 K/UL (ref 0–0.85)
MONOCYTES NFR BLD AUTO: 11.3 % (ref 0–13.4)
NEUTROPHILS # BLD AUTO: 3.94 K/UL (ref 1.82–7.42)
NEUTROPHILS NFR BLD: 50.2 % (ref 44–72)
NRBC # BLD AUTO: 0.02 K/UL
NRBC BLD-RTO: 0.3 /100 WBC (ref 0–0.2)
PHOSPHATE SERPL-MCNC: 2.9 MG/DL (ref 2.5–4.5)
PHOSPHATE SERPL-MCNC: 4.1 MG/DL (ref 2.5–4.5)
PLATELET # BLD AUTO: 276 K/UL (ref 164–446)
PMV BLD AUTO: 10.1 FL (ref 9–12.9)
POTASSIUM SERPL-SCNC: 3.4 MMOL/L (ref 3.6–5.5)
POTASSIUM SERPL-SCNC: 3.4 MMOL/L (ref 3.6–5.5)
PROT SERPL-MCNC: 5.2 G/DL (ref 6–8.2)
RBC # BLD AUTO: 2.7 M/UL (ref 4.2–5.4)
SODIUM SERPL-SCNC: 138 MMOL/L (ref 135–145)
SODIUM SERPL-SCNC: 139 MMOL/L (ref 135–145)
WBC # BLD AUTO: 7.9 K/UL (ref 4.8–10.8)

## 2023-05-28 PROCEDURE — 700111 HCHG RX REV CODE 636 W/ 250 OVERRIDE (IP): Performed by: INTERNAL MEDICINE

## 2023-05-28 PROCEDURE — 700111 HCHG RX REV CODE 636 W/ 250 OVERRIDE (IP): Performed by: HOSPITALIST

## 2023-05-28 PROCEDURE — 85025 COMPLETE CBC W/AUTO DIFF WBC: CPT

## 2023-05-28 PROCEDURE — 700101 HCHG RX REV CODE 250

## 2023-05-28 PROCEDURE — 99232 SBSQ HOSP IP/OBS MODERATE 35: CPT | Performed by: HOSPITALIST

## 2023-05-28 PROCEDURE — 700111 HCHG RX REV CODE 636 W/ 250 OVERRIDE (IP): Performed by: NURSE PRACTITIONER

## 2023-05-28 PROCEDURE — 770020 HCHG ROOM/CARE - TELE (206)

## 2023-05-28 PROCEDURE — 36415 COLL VENOUS BLD VENIPUNCTURE: CPT

## 2023-05-28 PROCEDURE — 83735 ASSAY OF MAGNESIUM: CPT | Mod: 91

## 2023-05-28 PROCEDURE — 700111 HCHG RX REV CODE 636 W/ 250 OVERRIDE (IP)

## 2023-05-28 PROCEDURE — 700105 HCHG RX REV CODE 258: Performed by: NURSE PRACTITIONER

## 2023-05-28 PROCEDURE — 84100 ASSAY OF PHOSPHORUS: CPT

## 2023-05-28 PROCEDURE — 700102 HCHG RX REV CODE 250 W/ 637 OVERRIDE(OP): Performed by: EMERGENCY MEDICINE

## 2023-05-28 PROCEDURE — 700105 HCHG RX REV CODE 258: Performed by: HOSPITALIST

## 2023-05-28 PROCEDURE — A9270 NON-COVERED ITEM OR SERVICE: HCPCS | Performed by: EMERGENCY MEDICINE

## 2023-05-28 PROCEDURE — 700102 HCHG RX REV CODE 250 W/ 637 OVERRIDE(OP): Performed by: HOSPITALIST

## 2023-05-28 PROCEDURE — A9270 NON-COVERED ITEM OR SERVICE: HCPCS

## 2023-05-28 PROCEDURE — 80053 COMPREHEN METABOLIC PANEL: CPT

## 2023-05-28 PROCEDURE — 700105 HCHG RX REV CODE 258

## 2023-05-28 PROCEDURE — 80048 BASIC METABOLIC PNL TOTAL CA: CPT

## 2023-05-28 PROCEDURE — 700102 HCHG RX REV CODE 250 W/ 637 OVERRIDE(OP)

## 2023-05-28 PROCEDURE — A9270 NON-COVERED ITEM OR SERVICE: HCPCS | Performed by: HOSPITALIST

## 2023-05-28 PROCEDURE — 700101 HCHG RX REV CODE 250: Performed by: HOSPITALIST

## 2023-05-28 RX ORDER — MAGNESIUM SULFATE HEPTAHYDRATE 40 MG/ML
2 INJECTION, SOLUTION INTRAVENOUS ONCE
Status: COMPLETED | OUTPATIENT
Start: 2023-05-28 | End: 2023-05-29

## 2023-05-28 RX ORDER — HYDROMORPHONE HYDROCHLORIDE 1 MG/ML
0.25 INJECTION, SOLUTION INTRAMUSCULAR; INTRAVENOUS; SUBCUTANEOUS
Status: DISCONTINUED | OUTPATIENT
Start: 2023-05-28 | End: 2023-05-29

## 2023-05-28 RX ORDER — CALCIUM CARBONATE 500 MG/1
500 TABLET, CHEWABLE ORAL ONCE
Status: COMPLETED | OUTPATIENT
Start: 2023-05-28 | End: 2023-05-28

## 2023-05-28 RX ORDER — SODIUM CHLORIDE 9 MG/ML
500 INJECTION, SOLUTION INTRAVENOUS ONCE
Status: COMPLETED | OUTPATIENT
Start: 2023-05-28 | End: 2023-05-28

## 2023-05-28 RX ORDER — POTASSIUM CHLORIDE 20 MEQ/1
40 TABLET, EXTENDED RELEASE ORAL ONCE
Status: COMPLETED | OUTPATIENT
Start: 2023-05-28 | End: 2023-05-28

## 2023-05-28 RX ORDER — OXYCODONE HYDROCHLORIDE 5 MG/1
5 TABLET ORAL EVERY 6 HOURS PRN
Status: DISCONTINUED | OUTPATIENT
Start: 2023-05-28 | End: 2023-05-29

## 2023-05-28 RX ADMIN — ANTACID TABLETS 500 MG: 500 TABLET, CHEWABLE ORAL at 05:54

## 2023-05-28 RX ADMIN — NYSTATIN 500000 UNITS: 100000 SUSPENSION ORAL at 21:48

## 2023-05-28 RX ADMIN — POTASSIUM CHLORIDE 40 MEQ: 1500 TABLET, EXTENDED RELEASE ORAL at 05:54

## 2023-05-28 RX ADMIN — ANTACID TABLETS 500 MG: 500 TABLET, CHEWABLE ORAL at 08:52

## 2023-05-28 RX ADMIN — OXYCODONE 5 MG: 5 TABLET ORAL at 20:13

## 2023-05-28 RX ADMIN — NYSTATIN 500000 UNITS: 100000 SUSPENSION ORAL at 09:04

## 2023-05-28 RX ADMIN — DIBASIC SODIUM PHOSPHATE, MONOBASIC POTASSIUM PHOSPHATE AND MONOBASIC SODIUM PHOSPHATE 250 MG: 852; 155; 130 TABLET ORAL at 00:23

## 2023-05-28 RX ADMIN — OMEPRAZOLE AND SODIUM BICARBONATE 40 MG: KIT at 18:14

## 2023-05-28 RX ADMIN — DIBASIC SODIUM PHOSPHATE, MONOBASIC POTASSIUM PHOSPHATE AND MONOBASIC SODIUM PHOSPHATE 250 MG: 852; 155; 130 TABLET ORAL at 05:54

## 2023-05-28 RX ADMIN — ENOXAPARIN SODIUM 40 MG: 100 INJECTION SUBCUTANEOUS at 18:16

## 2023-05-28 RX ADMIN — HYDROMORPHONE HYDROCHLORIDE 0.25 MG: 1 INJECTION, SOLUTION INTRAMUSCULAR; INTRAVENOUS; SUBCUTANEOUS at 04:39

## 2023-05-28 RX ADMIN — POTASSIUM CHLORIDE: 149 INJECTION, SOLUTION, CONCENTRATE INTRAVENOUS at 06:09

## 2023-05-28 RX ADMIN — CEFTRIAXONE SODIUM 1000 MG: 10 INJECTION, POWDER, FOR SOLUTION INTRAVENOUS at 18:15

## 2023-05-28 RX ADMIN — MAGNESIUM SULFATE HEPTAHYDRATE 2 G: 2 INJECTION, SOLUTION INTRAVENOUS at 22:06

## 2023-05-28 RX ADMIN — PAROXETINE HYDROCHLORIDE 60 MG: 20 TABLET, FILM COATED ORAL at 18:15

## 2023-05-28 RX ADMIN — ANTACID TABLETS 500 MG: 500 TABLET, CHEWABLE ORAL at 18:15

## 2023-05-28 RX ADMIN — OXYCODONE HYDROCHLORIDE 10 MG: 10 TABLET ORAL at 15:53

## 2023-05-28 RX ADMIN — NYSTATIN 500000 UNITS: 100000 SUSPENSION ORAL at 18:15

## 2023-05-28 RX ADMIN — HYDROMORPHONE HYDROCHLORIDE 0.25 MG: 1 INJECTION, SOLUTION INTRAMUSCULAR; INTRAVENOUS; SUBCUTANEOUS at 18:13

## 2023-05-28 RX ADMIN — LIDOCAINE HYDROCHLORIDE 5 ML: 20 SOLUTION ORAL; TOPICAL at 05:54

## 2023-05-28 RX ADMIN — HYDROMORPHONE HYDROCHLORIDE 0.25 MG: 1 INJECTION, SOLUTION INTRAMUSCULAR; INTRAVENOUS; SUBCUTANEOUS at 22:05

## 2023-05-28 RX ADMIN — Medication 100 MG: at 05:54

## 2023-05-28 RX ADMIN — OXYCODONE HYDROCHLORIDE 10 MG: 10 TABLET ORAL at 09:04

## 2023-05-28 RX ADMIN — POTASSIUM PHOSPHATE, MONOBASIC AND POTASSIUM PHOSPHATE, DIBASIC 30 MMOL: 224; 236 INJECTION, SOLUTION, CONCENTRATE INTRAVENOUS at 00:05

## 2023-05-28 RX ADMIN — SODIUM CHLORIDE 500 ML: 9 INJECTION, SOLUTION INTRAVENOUS at 20:06

## 2023-05-28 RX ADMIN — QUETIAPINE FUMARATE 100 MG: 100 TABLET ORAL at 18:15

## 2023-05-28 RX ADMIN — OXYCODONE HYDROCHLORIDE 10 MG: 10 TABLET ORAL at 01:40

## 2023-05-28 RX ADMIN — NYSTATIN 500000 UNITS: 100000 SUSPENSION ORAL at 13:23

## 2023-05-28 RX ADMIN — POTASSIUM PHOSPHATE, MONOBASIC AND POTASSIUM PHOSPHATE, DIBASIC 30 MMOL: 224; 236 INJECTION, SOLUTION, CONCENTRATE INTRAVENOUS at 08:54

## 2023-05-28 RX ADMIN — CALCIUM GLUCONATE 1 G: 20 INJECTION, SOLUTION INTRAVENOUS at 00:06

## 2023-05-28 RX ADMIN — LIDOCAINE HYDROCHLORIDE 5 ML: 20 SOLUTION ORAL; TOPICAL at 21:48

## 2023-05-28 RX ADMIN — ANTACID TABLETS 500 MG: 500 TABLET, CHEWABLE ORAL at 10:57

## 2023-05-28 RX ADMIN — HYDROMORPHONE HYDROCHLORIDE 0.25 MG: 1 INJECTION, SOLUTION INTRAMUSCULAR; INTRAVENOUS; SUBCUTANEOUS at 10:56

## 2023-05-28 ASSESSMENT — ENCOUNTER SYMPTOMS
HEADACHES: 0
WEIGHT LOSS: 1
BLURRED VISION: 0
SHORTNESS OF BREATH: 0
SORE THROAT: 0
DIZZINESS: 0
NEUROLOGICAL NEGATIVE: 1
DIAPHORESIS: 0
EYES NEGATIVE: 1
MUSCULOSKELETAL NEGATIVE: 1
ABDOMINAL PAIN: 1
RESPIRATORY NEGATIVE: 1
FEVER: 0
PALPITATIONS: 0
NAUSEA: 1
PSYCHIATRIC NEGATIVE: 1
CHILLS: 0
CARDIOVASCULAR NEGATIVE: 1
COUGH: 0
MYALGIAS: 0
VOMITING: 0
DEPRESSION: 0
FOCAL WEAKNESS: 0
BRUISES/BLEEDS EASILY: 0
WEAKNESS: 0

## 2023-05-28 ASSESSMENT — LIFESTYLE VARIABLES: SUBSTANCE_ABUSE: 0

## 2023-05-28 NOTE — PROGRESS NOTES
NOC HOSPITALIST CROSS COVER    Notified by RN regarding multiple electrolyte abnormalities including potassium 2.9, calcium 6.9, phosphorus 1.6.  Orders placed for ionized calcium level as well as replacement of calcium with 1 g IV calcium gluconate, 30 mmol IV potassium phosphate, 40 mEq oral potassium.      A/P:  #Hypokalemia; hypophosphatemia; hypocalcemia  -Ionized calcium level ordered and results of 1.0  -1 g IV calcium gluconate  -40 mEq oral potassium  -30 mmol IV potassium phosphate    -----------------------------------------------------------------------------------------------------------    Electronically signed by:  HEAVEN Barclay PA-C  Hospitalist Services

## 2023-05-28 NOTE — CARE PLAN
The patient is Stable - Low risk of patient condition declining or worsening    Shift Goals  Clinical Goals: tolerate tube feeds  Patient Goals: pain control  Family Goals: n/a    Progress made toward(s) clinical / shift goals:    Problem: Pain - Standard  Goal: Alleviation of pain or a reduction in pain to the patient’s comfort goal  Outcome: Progressing     Problem: Knowledge Deficit - Standard  Goal: Patient and family/care givers will demonstrate understanding of plan of care, disease process/condition, diagnostic tests and medications  Outcome: Progressing     Problem: Skin Integrity  Goal: Skin integrity is maintained or improved  Outcome: Progressing

## 2023-05-28 NOTE — PROGRESS NOTES
[facility] Medicine Daily Progress Note    Date of Service  5/28/2023    Chief Complaint  Mary Martinez is a 53 y.o. female admitted 5/25/2023 with confusion and failure to thrive    Hospital Course    Mary Martinez is a 53 y.o. female who presented to [facility] on 5/25/2023 with altered mental status.  Her son reported that patient had been having increasing confusion for the last few days/ He reported that she had similar symptoms in the past when she had UTI.  Patient reported that she had been extremely fatigued weak and unable to eat for the last 3 weeks.  She does have chronic abdominal pain and it was reportedly at her baseline. She also reported acute on chronic nausea and vomiting. For the last week, she had been unable to get up and walk around, typically uses a walker.  She denied any dysuria but did report urinary urgency.  Patient reported that she had been trying to get a feeding tube for the last 6 months.  On chart review patient was previously evaluated by surgery for feeding tube placement however she refused surgery at that time.      In the ER vital signs stable.  Labs significant for hemoglobin 9.8, hematocrit 29, sodium 133, potassium 2, CO2 5, anion gap 20, creatinine 1.18, GFR 56, calcium 8.2, albumin 3 with positive UA.      Interval Problem Update  Axox3, she denies abdominal pain today, no nausea or vomiting, tolerating very low rate feeding through feeding tube, does report throat discomfort from ngt. Vitals and exam stable. Ros otherwise negative     I have discussed this patient's plan of care and discharge plan at IDT rounds today with Case Management, Nursing, Nursing leadership, and other members of the IDT team.    Consultants/Specialty  GI  Surgery    Code Status  DNAR/DNI    Disposition    I have placed the appropriate orders for post-discharge needs.    Review of Systems  Review of Systems   Constitutional:  Positive for malaise/fatigue and weight loss. Negative for  chills, diaphoresis and fever.   HENT: Negative.  Negative for sore throat.    Eyes: Negative.  Negative for blurred vision.   Respiratory: Negative.  Negative for cough and shortness of breath.    Cardiovascular: Negative.  Negative for chest pain, palpitations and leg swelling.   Gastrointestinal:  Positive for abdominal pain and nausea. Negative for vomiting.   Genitourinary: Negative.  Negative for dysuria.   Musculoskeletal: Negative.  Negative for myalgias.   Skin: Negative.  Negative for itching and rash.   Neurological: Negative.  Negative for dizziness, focal weakness, weakness and headaches.   Endo/Heme/Allergies: Negative.  Does not bruise/bleed easily.   Psychiatric/Behavioral: Negative.  Negative for depression, substance abuse and suicidal ideas.    All other systems reviewed and are negative.       Physical Exam  Temp:  [36.8 °C (98.2 °F)-37.2 °C (99 °F)] 37 °C (98.6 °F)  Pulse:  [78-91] 78  Resp:  [14-16] 16  BP: ()/(58-83) 108/83  SpO2:  [92 %-98 %] 98 %    Physical Exam  Vitals and nursing note reviewed. Exam conducted with a chaperone present.   Constitutional:       General: She is not in acute distress.     Appearance: She is cachectic. She is ill-appearing. She is not diaphoretic.   HENT:      Head: Normocephalic.      Nose: Nose normal.      Mouth/Throat:      Mouth: Mucous membranes are moist.   Eyes:      Pupils: Pupils are equal, round, and reactive to light.   Cardiovascular:      Rate and Rhythm: Normal rate and regular rhythm.      Pulses: Normal pulses.      Heart sounds: Normal heart sounds.   Pulmonary:      Effort: Pulmonary effort is normal.      Breath sounds: Normal breath sounds.   Abdominal:      General: Abdomen is flat. Bowel sounds are normal.      Palpations: Abdomen is soft.      Tenderness: There is no abdominal tenderness (mild diffuse).   Musculoskeletal:         General: No swelling or deformity. Normal range of motion.   Skin:     General: Skin is warm and dry.       Capillary Refill: Capillary refill takes less than 2 seconds.   Neurological:      General: No focal deficit present.      Mental Status: She is alert and oriented to person, place, and time.      Cranial Nerves: No cranial nerve deficit.   Psychiatric:         Mood and Affect: Mood normal.         Behavior: Behavior normal.         Fluids    Intake/Output Summary (Last 24 hours) at 5/28/2023 1553  Last data filed at 5/28/2023 1137  Gross per 24 hour   Intake 4388.43 ml   Output 0 ml   Net 4388.43 ml       Laboratory  Recent Labs     05/26/23  0129 05/27/23  0138 05/28/23  0254   WBC 8.4 7.1 7.9   RBC 3.12* 2.65* 2.70*   HEMOGLOBIN 9.2* 7.6* 7.8*   HEMATOCRIT 27.6* 23.4* 23.6*   MCV 88.5 88.3 87.4   MCH 29.5 28.7 28.9   MCHC 33.3 32.5 33.1   RDW 64.1* 66.9* 65.7*   PLATELETCT 370 297 276   MPV 10.1 10.4 10.1     Recent Labs     05/27/23  2045 05/28/23  0254 05/28/23  1029   SODIUM 139 139 138   POTASSIUM 2.9* 3.4* 3.4*   CHLORIDE 110 108 104   CO2 17* 19* 23   GLUCOSE 105* 105* 116*   BUN 8 7* 7*   CREATININE 0.61 0.54 0.56   CALCIUM 6.9* 7.2* 7.0*                   Imaging  DX-ABDOMEN FOR TUBE PLACEMENT   Final Result      Nasogastric tube is now satisfactorily with the stomach.      DX-ABDOMEN FOR TUBE PLACEMENT   Final Result      Nasogastric tube side-port is in the distal esophagus. Recommend advancement.           Assessment/Plan  * High anion gap metabolic acidosis- (present on admission)  Assessment & Plan  Due to starvation ketosis  Lactic acid negative  This continues to improve but has not yet resolved, at risk for fluctuation - will continue to follow closely with serial bmp        UTI (urinary tract infection)- (present on admission)  Assessment & Plan  With urinary urgency   Continue rocephin  Urine culture so far negative.     Thrush- (present on admission)  Assessment & Plan  Hiv negative  Continue nystatin  Speech to follow    Failure to thrive in adult- (present on admission)  Assessment &  Plan  Severe malnutrition and muscle wasting, cachexia  Nutrition consulted  Aggressive IV fluid hydration with correction of electrolytes  Discussed with surgery and GI  GI will sign off, surgery planning J tube once no s/o re feeding syndrome    Hypocalcemia- (present on admission)  Assessment & Plan  Some improvement  Will continue replacement with tums  Repeat levels in am    Anemia  Assessment & Plan  Acute on chronic  Currently hemodilution and frequent blood draws contributing - trying to limit  Following  No melena or hematochezia    Bipolar 1 disorder (HCC)- (present on admission)  Assessment & Plan  Continue Paxil and Seroquel    Starvation ketoacidosis- (present on admission)  Assessment & Plan  Improving  following    Severe malnutrition ([facility])  Assessment & Plan  See above  She remains very high risk for refeeding syndrome, will continue to follow very closely with serial electrolytes  Discussed with nutritionist and GI    SMAS (superior mesenteric artery syndrome) c/b feeding issues ([facility])- (present on admission)  Assessment & Plan  Contributing to severe malnutrition, chronic abdominal pain    Unintentional weight loss- (present on admission)  Assessment & Plan  Related to SMA and inability to keep food down, see above, addressing  Hiv and lipase negative  Malnutrition contributing    Chronic pain- (present on admission)  Assessment & Plan  Patient on home oxycodone but narcotics contributing to her chronic emesis, I will start to taper back the opiates she has been getting here with goal to return to home dose, following  Continue Paxil    Hypophosphatemia- (present on admission)  Assessment & Plan  Replacing  Resolved but high risk for fluctuation and remains high risk for refeeding syndrome  Will continue to follow, repeat levels in am      HASMUKH (acute kidney injury) ([facility])- (present on admission)  Assessment & Plan  Resolved  Due to dehydration  following  Avoid nephrotoxic  medications      Hypokalemia- (present on admission)  Assessment & Plan  Markedly improved  Will continue to follow  Will add additional iv replacement today  Continue to follow closely and continue serial bmp with tele    Rheumatoid arthritis (HCC)- (present on admission)  Assessment & Plan  With chronic deformity and s/p amputations  No evidence of acute flair  following    Tobacco dependence- (present on admission)  Assessment & Plan  Cessation discussed and recommended > 3 min    Pancreatitis- (present on admission)  Assessment & Plan  H/o  Ongoing abdominal pain improved  Lipase negative    Hyponatremia- (present on admission)  Assessment & Plan  Sodium 133 on admission  Likely due to severe dehydration  Now resolved  Will continue to follow         VTE prophylaxis: enoxaparin ppx    I have performed a physical exam and reviewed and updated ROS and Plan today (5/28/2023). In review of yesterday's note (5/27/2023), there are no changes except as documented above.

## 2023-05-28 NOTE — CARE PLAN
The patient is Stable - Low risk of patient condition declining or worsening    Shift Goals  Clinical Goals: tolerate tube feeds  Patient Goals: pain control  Family Goals: n/a    Progress made toward(s) clinical / shift goals:    Problem: Pain - Standard  Goal: Alleviation of pain or a reduction in pain to the patient’s comfort goal  Outcome: Progressing     Problem: Knowledge Deficit - Standard  Goal: Patient and family/care givers will demonstrate understanding of plan of care, disease process/condition, diagnostic tests and medications  Outcome: Progressing     Problem: Fall Risk  Goal: Patient will remain free from falls  Outcome: Progressing

## 2023-05-28 NOTE — PROGRESS NOTES
NOC HOSPITALIST CROSS COVER    Notified by RN of potassium level of 3.4.      #Hypokalemia  -Replete with K-dur 40 mEq PO once  -Patient is at high risk for refeeding syndrome  -Continue to monitor BMP closely        ________________________________________________________________________________________  Electronically signed by:  ROSSI Powers, AGACNP-BC

## 2023-05-29 LAB
ANION GAP SERPL CALC-SCNC: 10 MMOL/L (ref 7–16)
ANION GAP SERPL CALC-SCNC: 11 MMOL/L (ref 7–16)
ANION GAP SERPL CALC-SCNC: 11 MMOL/L (ref 7–16)
BASOPHILS # BLD AUTO: 0.5 % (ref 0–1.8)
BASOPHILS # BLD: 0.04 K/UL (ref 0–0.12)
BUN SERPL-MCNC: 10 MG/DL (ref 8–22)
BUN SERPL-MCNC: 6 MG/DL (ref 8–22)
BUN SERPL-MCNC: 8 MG/DL (ref 8–22)
CALCIUM SERPL-MCNC: 7.3 MG/DL (ref 8.5–10.5)
CALCIUM SERPL-MCNC: 7.7 MG/DL (ref 8.5–10.5)
CALCIUM SERPL-MCNC: 7.8 MG/DL (ref 8.5–10.5)
CHLORIDE SERPL-SCNC: 102 MMOL/L (ref 96–112)
CHLORIDE SERPL-SCNC: 104 MMOL/L (ref 96–112)
CHLORIDE SERPL-SCNC: 105 MMOL/L (ref 96–112)
CO2 SERPL-SCNC: 23 MMOL/L (ref 20–33)
CO2 SERPL-SCNC: 25 MMOL/L (ref 20–33)
CO2 SERPL-SCNC: 25 MMOL/L (ref 20–33)
CREAT SERPL-MCNC: 0.61 MG/DL (ref 0.5–1.4)
CREAT SERPL-MCNC: 0.96 MG/DL (ref 0.5–1.4)
CREAT SERPL-MCNC: 1.18 MG/DL (ref 0.5–1.4)
CRP SERPL HS-MCNC: 1.77 MG/DL (ref 0–0.75)
EOSINOPHIL # BLD AUTO: 0.18 K/UL (ref 0–0.51)
EOSINOPHIL NFR BLD: 2.4 % (ref 0–6.9)
ERYTHROCYTE [DISTWIDTH] IN BLOOD BY AUTOMATED COUNT: 63.7 FL (ref 35.9–50)
GFR SERPLBLD CREATININE-BSD FMLA CKD-EPI: 108 ML/MIN/1.73 M 2
GFR SERPLBLD CREATININE-BSD FMLA CKD-EPI: 56 ML/MIN/1.73 M 2
GFR SERPLBLD CREATININE-BSD FMLA CKD-EPI: 72 ML/MIN/1.73 M 2
GLUCOSE SERPL-MCNC: 111 MG/DL (ref 65–99)
GLUCOSE SERPL-MCNC: 127 MG/DL (ref 65–99)
GLUCOSE SERPL-MCNC: 138 MG/DL (ref 65–99)
HCT VFR BLD AUTO: 25.6 % (ref 37–47)
HGB BLD-MCNC: 8.6 G/DL (ref 12–16)
IMM GRANULOCYTES # BLD AUTO: 0.1 K/UL (ref 0–0.11)
IMM GRANULOCYTES NFR BLD AUTO: 1.3 % (ref 0–0.9)
LYMPHOCYTES # BLD AUTO: 2.36 K/UL (ref 1–4.8)
LYMPHOCYTES NFR BLD: 30.8 % (ref 22–41)
MAGNESIUM SERPL-MCNC: 2.1 MG/DL (ref 1.5–2.5)
MAGNESIUM SERPL-MCNC: 2.1 MG/DL (ref 1.5–2.5)
MAGNESIUM SERPL-MCNC: 2.3 MG/DL (ref 1.5–2.5)
MCH RBC QN AUTO: 28.8 PG (ref 27–33)
MCHC RBC AUTO-ENTMCNC: 33.6 G/DL (ref 32.2–35.5)
MCV RBC AUTO: 85.6 FL (ref 81.4–97.8)
MONOCYTES # BLD AUTO: 0.92 K/UL (ref 0–0.85)
MONOCYTES NFR BLD AUTO: 12 % (ref 0–13.4)
NEUTROPHILS # BLD AUTO: 4.05 K/UL (ref 1.82–7.42)
NEUTROPHILS NFR BLD: 53 % (ref 44–72)
NRBC # BLD AUTO: 0.02 K/UL
NRBC BLD-RTO: 0.3 /100 WBC (ref 0–0.2)
PHOSPHATE SERPL-MCNC: 3 MG/DL (ref 2.5–4.5)
PHOSPHATE SERPL-MCNC: 3 MG/DL (ref 2.5–4.5)
PHOSPHATE SERPL-MCNC: 3.1 MG/DL (ref 2.5–4.5)
PLATELET # BLD AUTO: 301 K/UL (ref 164–446)
PMV BLD AUTO: 9.9 FL (ref 9–12.9)
POTASSIUM SERPL-SCNC: 3.4 MMOL/L (ref 3.6–5.5)
POTASSIUM SERPL-SCNC: 3.6 MMOL/L (ref 3.6–5.5)
POTASSIUM SERPL-SCNC: 3.7 MMOL/L (ref 3.6–5.5)
PREALB SERPL-MCNC: 12.8 MG/DL (ref 18–38)
RBC # BLD AUTO: 2.99 M/UL (ref 4.2–5.4)
SODIUM SERPL-SCNC: 136 MMOL/L (ref 135–145)
SODIUM SERPL-SCNC: 140 MMOL/L (ref 135–145)
SODIUM SERPL-SCNC: 140 MMOL/L (ref 135–145)
WBC # BLD AUTO: 7.7 K/UL (ref 4.8–10.8)

## 2023-05-29 PROCEDURE — 85025 COMPLETE CBC W/AUTO DIFF WBC: CPT

## 2023-05-29 PROCEDURE — A9270 NON-COVERED ITEM OR SERVICE: HCPCS

## 2023-05-29 PROCEDURE — 84100 ASSAY OF PHOSPHORUS: CPT | Mod: 91

## 2023-05-29 PROCEDURE — 83735 ASSAY OF MAGNESIUM: CPT | Mod: 91

## 2023-05-29 PROCEDURE — 80048 BASIC METABOLIC PNL TOTAL CA: CPT | Mod: 91

## 2023-05-29 PROCEDURE — 700111 HCHG RX REV CODE 636 W/ 250 OVERRIDE (IP): Performed by: INTERNAL MEDICINE

## 2023-05-29 PROCEDURE — 700102 HCHG RX REV CODE 250 W/ 637 OVERRIDE(OP)

## 2023-05-29 PROCEDURE — 700102 HCHG RX REV CODE 250 W/ 637 OVERRIDE(OP): Performed by: HOSPITALIST

## 2023-05-29 PROCEDURE — 99232 SBSQ HOSP IP/OBS MODERATE 35: CPT | Performed by: HOSPITALIST

## 2023-05-29 PROCEDURE — 700102 HCHG RX REV CODE 250 W/ 637 OVERRIDE(OP): Performed by: EMERGENCY MEDICINE

## 2023-05-29 PROCEDURE — A9270 NON-COVERED ITEM OR SERVICE: HCPCS | Performed by: HOSPITALIST

## 2023-05-29 PROCEDURE — 36415 COLL VENOUS BLD VENIPUNCTURE: CPT

## 2023-05-29 PROCEDURE — A9270 NON-COVERED ITEM OR SERVICE: HCPCS | Performed by: EMERGENCY MEDICINE

## 2023-05-29 PROCEDURE — 700111 HCHG RX REV CODE 636 W/ 250 OVERRIDE (IP): Performed by: HOSPITALIST

## 2023-05-29 PROCEDURE — 700105 HCHG RX REV CODE 258: Performed by: HOSPITALIST

## 2023-05-29 PROCEDURE — 770001 HCHG ROOM/CARE - MED/SURG/GYN PRIV*

## 2023-05-29 PROCEDURE — 84134 ASSAY OF PREALBUMIN: CPT

## 2023-05-29 PROCEDURE — 86140 C-REACTIVE PROTEIN: CPT

## 2023-05-29 RX ORDER — OXYCODONE HYDROCHLORIDE 5 MG/1
5 TABLET ORAL EVERY 6 HOURS PRN
Status: DISCONTINUED | OUTPATIENT
Start: 2023-05-29 | End: 2023-05-30

## 2023-05-29 RX ORDER — HYDROMORPHONE HYDROCHLORIDE 1 MG/ML
0.25 INJECTION, SOLUTION INTRAMUSCULAR; INTRAVENOUS; SUBCUTANEOUS 2 TIMES DAILY PRN
Status: DISCONTINUED | OUTPATIENT
Start: 2023-05-29 | End: 2023-05-30

## 2023-05-29 RX ORDER — POTASSIUM CHLORIDE 20 MEQ/1
40 TABLET, EXTENDED RELEASE ORAL ONCE
Status: COMPLETED | OUTPATIENT
Start: 2023-05-29 | End: 2023-05-29

## 2023-05-29 RX ADMIN — OMEPRAZOLE AND SODIUM BICARBONATE 40 MG: KIT at 16:23

## 2023-05-29 RX ADMIN — OXYCODONE 5 MG: 5 TABLET ORAL at 22:34

## 2023-05-29 RX ADMIN — QUETIAPINE FUMARATE 100 MG: 100 TABLET ORAL at 16:23

## 2023-05-29 RX ADMIN — PAROXETINE HYDROCHLORIDE 60 MG: 20 TABLET, FILM COATED ORAL at 16:23

## 2023-05-29 RX ADMIN — ANTACID TABLETS 500 MG: 500 TABLET, CHEWABLE ORAL at 12:58

## 2023-05-29 RX ADMIN — ENOXAPARIN SODIUM 40 MG: 100 INJECTION SUBCUTANEOUS at 16:23

## 2023-05-29 RX ADMIN — OXYCODONE 5 MG: 5 TABLET ORAL at 03:19

## 2023-05-29 RX ADMIN — POTASSIUM CHLORIDE 40 MEQ: 1500 TABLET, EXTENDED RELEASE ORAL at 03:19

## 2023-05-29 RX ADMIN — Medication 100 MG: at 06:39

## 2023-05-29 RX ADMIN — OXYCODONE 5 MG: 5 TABLET ORAL at 16:23

## 2023-05-29 RX ADMIN — OXYCODONE 5 MG: 5 TABLET ORAL at 10:07

## 2023-05-29 RX ADMIN — POTASSIUM CHLORIDE: 149 INJECTION, SOLUTION, CONCENTRATE INTRAVENOUS at 14:43

## 2023-05-29 RX ADMIN — ANTACID TABLETS 500 MG: 500 TABLET, CHEWABLE ORAL at 06:39

## 2023-05-29 RX ADMIN — NYSTATIN 500000 UNITS: 100000 SUSPENSION ORAL at 22:34

## 2023-05-29 RX ADMIN — NYSTATIN 500000 UNITS: 100000 SUSPENSION ORAL at 12:58

## 2023-05-29 RX ADMIN — NYSTATIN 500000 UNITS: 100000 SUSPENSION ORAL at 10:07

## 2023-05-29 RX ADMIN — NYSTATIN 500000 UNITS: 100000 SUSPENSION ORAL at 16:23

## 2023-05-29 RX ADMIN — HYDROMORPHONE HYDROCHLORIDE 0.25 MG: 1 INJECTION, SOLUTION INTRAMUSCULAR; INTRAVENOUS; SUBCUTANEOUS at 06:39

## 2023-05-29 RX ADMIN — ANTACID TABLETS 500 MG: 500 TABLET, CHEWABLE ORAL at 16:23

## 2023-05-29 ASSESSMENT — ENCOUNTER SYMPTOMS
WEAKNESS: 0
WEIGHT LOSS: 1
EYES NEGATIVE: 1
ABDOMINAL PAIN: 0
BRUISES/BLEEDS EASILY: 0
SHORTNESS OF BREATH: 0
PSYCHIATRIC NEGATIVE: 1
COUGH: 0
SORE THROAT: 0
MUSCULOSKELETAL NEGATIVE: 1
HEADACHES: 0
MYALGIAS: 0
DEPRESSION: 0
RESPIRATORY NEGATIVE: 1
VOMITING: 0
FEVER: 0
FOCAL WEAKNESS: 0
PALPITATIONS: 0
BLURRED VISION: 0
NEUROLOGICAL NEGATIVE: 1
DIAPHORESIS: 0
DIZZINESS: 0
NAUSEA: 0
CARDIOVASCULAR NEGATIVE: 1
CHILLS: 0

## 2023-05-29 ASSESSMENT — FIBROSIS 4 INDEX: FIB4 SCORE: 0.56

## 2023-05-29 ASSESSMENT — LIFESTYLE VARIABLES: SUBSTANCE_ABUSE: 0

## 2023-05-29 NOTE — CARE PLAN
The patient is Stable - Low risk of patient condition declining or worsening    Shift Goals  Clinical Goals: hemodynamic stability / electrolyte replacement  Patient Goals: pain control  Family Goals: n/a    Progress made toward(s) clinical / shift goals:    Problem: Pain - Standard  Goal: Alleviation of pain or a reduction in pain to the patient’s comfort goal  Outcome: Progressing     Problem: Knowledge Deficit - Standard  Goal: Patient and family/care givers will demonstrate understanding of plan of care, disease process/condition, diagnostic tests and medications  Outcome: Progressing     Problem: Fall Risk  Goal: Patient will remain free from falls  Outcome: Progressing     Problem: Skin Integrity  Goal: Skin integrity is maintained or improved  Outcome: Progressing

## 2023-05-29 NOTE — PROGRESS NOTES
[facility] Medicine Daily Progress Note    Date of Service  5/29/2023    Chief Complaint  Mary Martinez is a 53 y.o. female admitted 5/25/2023 with confusion and failure to thrive    Hospital Course    Mary Martinez is a 53 y.o. female who presented to [facility] on 5/25/2023 with altered mental status.  Her son reported that patient had been having increasing confusion for the last few days/ He reported that she had similar symptoms in the past when she had UTI.  Patient reported that she had been extremely fatigued weak and unable to eat for the last 3 weeks.  She does have chronic abdominal pain and it was reportedly at her baseline. She also reported acute on chronic nausea and vomiting. For the last week, she had been unable to get up and walk around, typically uses a walker.  She denied any dysuria but did report urinary urgency.  Patient reported that she had been trying to get a feeding tube for the last 6 months.  On chart review patient was previously evaluated by surgery for feeding tube placement however she refused surgery at that time.      In the ER vital signs stable.  Labs significant for hemoglobin 9.8, hematocrit 29, sodium 133, potassium 2, CO2 5, anion gap 20, creatinine 1.18, GFR 56, calcium 8.2, albumin 3 with positive UA.      Interval Problem Update  Axox3, she continues to tolerated slow tube feeds, denies abdominal pain today, main complaint is discomfort from tube feed in the throat, no nausea or emesis. No cp, no sob. Ros otherwise negative.     I have discussed this patient's plan of care and discharge plan at IDT rounds today with Case Management, Nursing, Nursing leadership, and other members of the IDT team.    Consultants/Specialty  GI  Surgery    Code Status  DNAR/DNI    Disposition    I have placed the appropriate orders for post-discharge needs.    Review of Systems  Review of Systems   Constitutional:  Positive for malaise/fatigue and weight loss. Negative for chills,  diaphoresis and fever.   HENT: Negative.  Negative for sore throat.         Throat pain   Eyes: Negative.  Negative for blurred vision.   Respiratory: Negative.  Negative for cough and shortness of breath.    Cardiovascular: Negative.  Negative for chest pain, palpitations and leg swelling.   Gastrointestinal:  Negative for abdominal pain, nausea and vomiting.   Genitourinary: Negative.  Negative for dysuria.   Musculoskeletal: Negative.  Negative for myalgias.   Skin: Negative.  Negative for itching and rash.   Neurological: Negative.  Negative for dizziness, focal weakness, weakness and headaches.   Endo/Heme/Allergies: Negative.  Does not bruise/bleed easily.   Psychiatric/Behavioral: Negative.  Negative for depression, substance abuse and suicidal ideas.    All other systems reviewed and are negative.       Physical Exam  Temp:  [36.6 °C (97.9 °F)-37 °C (98.6 °F)] 36.7 °C (98.1 °F)  Pulse:  [69-80] 71  Resp:  [16-18] 18  BP: ()/(54-89) 119/73  SpO2:  [95 %-98 %] 95 %    Physical Exam  Vitals and nursing note reviewed. Exam conducted with a chaperone present.   Constitutional:       General: She is not in acute distress.     Appearance: She is cachectic. She is ill-appearing. She is not diaphoretic.   HENT:      Head: Normocephalic.      Nose: Nose normal.      Mouth/Throat:      Mouth: Mucous membranes are moist.   Eyes:      Pupils: Pupils are equal, round, and reactive to light.   Cardiovascular:      Rate and Rhythm: Normal rate and regular rhythm.      Pulses: Normal pulses.      Heart sounds: Normal heart sounds.   Pulmonary:      Effort: Pulmonary effort is normal.      Breath sounds: Normal breath sounds.   Abdominal:      General: Abdomen is flat. Bowel sounds are normal.      Palpations: Abdomen is soft.      Tenderness: There is no abdominal tenderness (mild diffuse).   Musculoskeletal:         General: No swelling or deformity. Normal range of motion.   Skin:     General: Skin is warm and dry.       Capillary Refill: Capillary refill takes less than 2 seconds.   Neurological:      General: No focal deficit present.      Mental Status: She is alert and oriented to person, place, and time.      Cranial Nerves: No cranial nerve deficit.   Psychiatric:         Mood and Affect: Mood normal.         Behavior: Behavior normal.         Fluids    Intake/Output Summary (Last 24 hours) at 5/29/2023 1446  Last data filed at 5/29/2023 0737  Gross per 24 hour   Intake 421 ml   Output 1500 ml   Net -1079 ml       Laboratory  Recent Labs     05/27/23  0138 05/28/23  0254 05/29/23  0027   WBC 7.1 7.9 7.7   RBC 2.65* 2.70* 2.99*   HEMOGLOBIN 7.6* 7.8* 8.6*   HEMATOCRIT 23.4* 23.6* 25.6*   MCV 88.3 87.4 85.6   MCH 28.7 28.9 28.8   MCHC 32.5 33.1 33.6   RDW 66.9* 65.7* 63.7*   PLATELETCT 297 276 301   MPV 10.4 10.1 9.9     Recent Labs     05/28/23  0254 05/28/23  1029 05/29/23  0027   SODIUM 139 138 136   POTASSIUM 3.4* 3.4* 3.4*   CHLORIDE 108 104 102   CO2 19* 23 23   GLUCOSE 105* 116* 111*   BUN 7* 7* 6*   CREATININE 0.54 0.56 0.61   CALCIUM 7.2* 7.0* 7.3*                   Imaging  DX-ABDOMEN FOR TUBE PLACEMENT   Final Result      Nasogastric tube is now satisfactorily with the stomach.      DX-ABDOMEN FOR TUBE PLACEMENT   Final Result      Nasogastric tube side-port is in the distal esophagus. Recommend advancement.           Assessment/Plan  * High anion gap metabolic acidosis- (present on admission)  Assessment & Plan  Due to starvation ketosis  Lactic acid negative  Resolved  Will d/c bicarb  Continue to follow  Repeat bmp in am         UTI (urinary tract infection)- (present on admission)  Assessment & Plan  With urinary urgency   Sx now resolved  Completed tx    Thrush- (present on admission)  Assessment & Plan  Hiv negative  Continue nystatin  Speech to follow    Failure to thrive in adult- (present on admission)  Assessment & Plan  Severe malnutrition and muscle wasting, cachexia  Nutrition consulted  Aggressive  IV fluid hydration with correction of electrolytes  Discussed with surgery and GI  GI will sign off, surgery planning J tube once no s/o re feeding syndrome    Hypocalcemia- (present on admission)  Assessment & Plan  Some improvement  Will continue replacement with tums  Repeat levels in am    Anemia  Assessment & Plan  Acute on chronic  Currently hemodilution and frequent blood draws contributing - trying to limit  Following  No melena or hematochezia    Bipolar 1 disorder (HCC)- (present on admission)  Assessment & Plan  Continue Paxil and Seroquel    Starvation ketoacidosis- (present on admission)  Assessment & Plan  Improving  following    Severe malnutrition (HCC)  Assessment & Plan  See above  She remains very high risk for refeeding syndrome, will continue to follow very closely with serial electrolytes  Discussed with nutritionist and GI    SMAS (superior mesenteric artery syndrome) c/b feeding issues (HCC)- (present on admission)  Assessment & Plan  Contributing to severe malnutrition, chronic abdominal pain    Unintentional weight loss- (present on admission)  Assessment & Plan  Related to SMA and inability to keep food down, see above, addressing  Hiv and lipase negative  Malnutrition contributing    Chronic pain- (present on admission)  Assessment & Plan  Patient on home oxycodone but narcotics contributing to her chronic emesis, continue to slowly taper back   Continue Paxil    Hypophosphatemia- (present on admission)  Assessment & Plan  Replaced  Resolved but high risk for fluctuation and remains high risk for refeeding syndrome  Continue to follow  Repeat labs in am      HASMUKH (acute kidney injury) (HCC)- (present on admission)  Assessment & Plan  Resolved  Due to dehydration  following  Avoid nephrotoxic medications      Hypokalemia- (present on admission)  Assessment & Plan  Markedly improved  Will continue to follow  Nearly resolved  Will continue to replace as needed  Bmp in am   No further need for  tele as no longer critically low    Rheumatoid arthritis (HCC)- (present on admission)  Assessment & Plan  With chronic deformity and s/p amputations  No evidence of acute flair  Following  Will continue baseline opiates but tapering off iv dilaudid    Tobacco dependence- (present on admission)  Assessment & Plan  Cessation discussed and recommended > 3 min    Pancreatitis- (present on admission)  Assessment & Plan  H/o  Lipase negative    Hyponatremia- (present on admission)  Assessment & Plan  Sodium 133 on admission  Likely due to severe dehydration  Now resolved  Will continue to follow         VTE prophylaxis: enoxaparin ppx    I have performed a physical exam and reviewed and updated ROS and Plan today (5/29/2023). In review of yesterday's note (5/28/2023), there are no changes except as documented above.

## 2023-05-29 NOTE — PROGRESS NOTES
NOC HOSPITALIST CROSS COVER    Notified by RN of potassium level of 3.4.      #Hypokalemia  -Replete with K-dur 40 mEq         ________________________________________________________________________________________  Electronically signed by:  ROSSI Powers, AGACNP-BC

## 2023-05-29 NOTE — CARE PLAN
The patient is Stable - Low risk of patient condition declining or worsening    Shift Goals  Clinical Goals: hemodynamic stability / electrolyte replacement  Patient Goals: pain control  Family Goals: n/a    Progress made toward(s) clinical / shift goals:    Problem: Knowledge Deficit - Standard  Goal: Patient and family/care givers will demonstrate understanding of plan of care, disease process/condition, diagnostic tests and medications  Outcome: Progressing     Problem: Fall Risk  Goal: Patient will remain free from falls  Outcome: Progressing

## 2023-05-29 NOTE — DIETARY
Nutrition Services: Update   Day 4 of admit.  Mary Martinez is a 53 y.o. female with admitting DX of High anion gap metabolic acidosis     Problem: Nutritional:  Goal: Nutrition support tolerated and meeting greater than 85% of estimated needs  Outcome: Progressing slower than anticipated     Pt currently receiving 15mL/hr of Fibersource HN, yielding 432 kcal, 19 grams of protein, and 292 ml free water. This is ~34% of minimal kcal goal and 58% of protein. Per ASPEN refeeding consensus recommendations, RD optimistic to reach goal by 7-10th day of enteral nutrition support initiation. Repletion of potassium to WNL essential in order to advance TF beyond 5mL/day, will continue with stepwise advancement of 5mL every 24 hours to reach goal rate by day 8 s/p TF start date of 5/26. Per ASPEN guidelines, do not hold TF if electrolytes are low, though do not advance further if electrolytes significantly low.     Labwork: K 3.4-has not reached WNL since admission, phos WNL since 5/28, magnesium WNL since 5/29.     Malnutrition Risk: see dx on 5/26, nutrition recommendations remain appropriate and will continue to advance TF slowly to goal.     Recommendations/Plan:  Continue to advance TF by rate of 5 mL x 24 hours, anticipate advancement to 20mL/hr today.   Continue advancement of TF to reach goal rate of 45mL/hr by day 8 or 6/3 or sooner pending electrolyte stabilization.   Final goal rate will be 45 ml/hr which will provide 1296 kcals, 58 grams protein, 874 mL free water.    Monitor weight trends.  Continue monitoring of electrolytes, lab order in until 6/2/23 RD following

## 2023-05-30 ENCOUNTER — APPOINTMENT (OUTPATIENT)
Dept: RADIOLOGY | Facility: MEDICAL CENTER | Age: 53
DRG: 682 | End: 2023-05-30
Payer: MEDICAID

## 2023-05-30 LAB
ANION GAP SERPL CALC-SCNC: 11 MMOL/L (ref 7–16)
ANION GAP SERPL CALC-SCNC: 12 MMOL/L (ref 7–16)
BUN SERPL-MCNC: 9 MG/DL (ref 8–22)
BUN SERPL-MCNC: 9 MG/DL (ref 8–22)
CALCIUM SERPL-MCNC: 7.5 MG/DL (ref 8.5–10.5)
CALCIUM SERPL-MCNC: 8 MG/DL (ref 8.5–10.5)
CHLORIDE SERPL-SCNC: 105 MMOL/L (ref 96–112)
CHLORIDE SERPL-SCNC: 107 MMOL/L (ref 96–112)
CO2 SERPL-SCNC: 21 MMOL/L (ref 20–33)
CO2 SERPL-SCNC: 22 MMOL/L (ref 20–33)
CREAT SERPL-MCNC: 1.23 MG/DL (ref 0.5–1.4)
CREAT SERPL-MCNC: 1.44 MG/DL (ref 0.5–1.4)
GFR SERPLBLD CREATININE-BSD FMLA CKD-EPI: 44 ML/MIN/1.73 M 2
GFR SERPLBLD CREATININE-BSD FMLA CKD-EPI: 53 ML/MIN/1.73 M 2
GLUCOSE SERPL-MCNC: 105 MG/DL (ref 65–99)
GLUCOSE SERPL-MCNC: 145 MG/DL (ref 65–99)
MAGNESIUM SERPL-MCNC: 1.7 MG/DL (ref 1.5–2.5)
MAGNESIUM SERPL-MCNC: 1.8 MG/DL (ref 1.5–2.5)
MAGNESIUM SERPL-MCNC: 1.9 MG/DL (ref 1.5–2.5)
PHOSPHATE SERPL-MCNC: 2.4 MG/DL (ref 2.5–4.5)
PHOSPHATE SERPL-MCNC: 2.9 MG/DL (ref 2.5–4.5)
PHOSPHATE SERPL-MCNC: 4.3 MG/DL (ref 2.5–4.5)
POTASSIUM SERPL-SCNC: 3.5 MMOL/L (ref 3.6–5.5)
POTASSIUM SERPL-SCNC: 4.1 MMOL/L (ref 3.6–5.5)
SODIUM SERPL-SCNC: 138 MMOL/L (ref 135–145)
SODIUM SERPL-SCNC: 140 MMOL/L (ref 135–145)

## 2023-05-30 PROCEDURE — A9270 NON-COVERED ITEM OR SERVICE: HCPCS | Performed by: HOSPITALIST

## 2023-05-30 PROCEDURE — 770001 HCHG ROOM/CARE - MED/SURG/GYN PRIV*

## 2023-05-30 PROCEDURE — 97530 THERAPEUTIC ACTIVITIES: CPT

## 2023-05-30 PROCEDURE — 700105 HCHG RX REV CODE 258: Performed by: HOSPITALIST

## 2023-05-30 PROCEDURE — 83735 ASSAY OF MAGNESIUM: CPT | Mod: 91

## 2023-05-30 PROCEDURE — 700111 HCHG RX REV CODE 636 W/ 250 OVERRIDE (IP): Performed by: INTERNAL MEDICINE

## 2023-05-30 PROCEDURE — 80048 BASIC METABOLIC PNL TOTAL CA: CPT | Mod: 91

## 2023-05-30 PROCEDURE — 700105 HCHG RX REV CODE 258: Performed by: INTERNAL MEDICINE

## 2023-05-30 PROCEDURE — 84100 ASSAY OF PHOSPHORUS: CPT | Mod: 91

## 2023-05-30 PROCEDURE — 700102 HCHG RX REV CODE 250 W/ 637 OVERRIDE(OP): Performed by: INTERNAL MEDICINE

## 2023-05-30 PROCEDURE — 700102 HCHG RX REV CODE 250 W/ 637 OVERRIDE(OP): Performed by: HOSPITALIST

## 2023-05-30 PROCEDURE — 700111 HCHG RX REV CODE 636 W/ 250 OVERRIDE (IP): Performed by: HOSPITALIST

## 2023-05-30 PROCEDURE — 36415 COLL VENOUS BLD VENIPUNCTURE: CPT

## 2023-05-30 PROCEDURE — A9270 NON-COVERED ITEM OR SERVICE: HCPCS | Performed by: EMERGENCY MEDICINE

## 2023-05-30 PROCEDURE — A9270 NON-COVERED ITEM OR SERVICE: HCPCS | Performed by: INTERNAL MEDICINE

## 2023-05-30 PROCEDURE — 700102 HCHG RX REV CODE 250 W/ 637 OVERRIDE(OP): Performed by: EMERGENCY MEDICINE

## 2023-05-30 PROCEDURE — 99232 SBSQ HOSP IP/OBS MODERATE 35: CPT | Performed by: SURGERY

## 2023-05-30 PROCEDURE — 700101 HCHG RX REV CODE 250: Performed by: INTERNAL MEDICINE

## 2023-05-30 PROCEDURE — 99232 SBSQ HOSP IP/OBS MODERATE 35: CPT | Performed by: INTERNAL MEDICINE

## 2023-05-30 RX ORDER — HYDROMORPHONE HYDROCHLORIDE 1 MG/ML
0.25 INJECTION, SOLUTION INTRAMUSCULAR; INTRAVENOUS; SUBCUTANEOUS 2 TIMES DAILY PRN
Status: DISCONTINUED | OUTPATIENT
Start: 2023-05-30 | End: 2023-06-01

## 2023-05-30 RX ORDER — OXYCODONE HYDROCHLORIDE 10 MG/1
10 TABLET ORAL EVERY 4 HOURS PRN
Status: DISCONTINUED | OUTPATIENT
Start: 2023-05-30 | End: 2023-06-01

## 2023-05-30 RX ADMIN — NYSTATIN 500000 UNITS: 100000 SUSPENSION ORAL at 17:32

## 2023-05-30 RX ADMIN — NYSTATIN 500000 UNITS: 100000 SUSPENSION ORAL at 12:25

## 2023-05-30 RX ADMIN — QUETIAPINE FUMARATE 100 MG: 100 TABLET ORAL at 17:32

## 2023-05-30 RX ADMIN — OXYCODONE 5 MG: 5 TABLET ORAL at 05:58

## 2023-05-30 RX ADMIN — Medication 100 MG: at 05:58

## 2023-05-30 RX ADMIN — NYSTATIN 500000 UNITS: 100000 SUSPENSION ORAL at 21:27

## 2023-05-30 RX ADMIN — NYSTATIN 500000 UNITS: 100000 SUSPENSION ORAL at 08:21

## 2023-05-30 RX ADMIN — OXYCODONE HYDROCHLORIDE 10 MG: 10 TABLET ORAL at 23:12

## 2023-05-30 RX ADMIN — OMEPRAZOLE AND SODIUM BICARBONATE 40 MG: KIT at 17:32

## 2023-05-30 RX ADMIN — POTASSIUM CHLORIDE: 149 INJECTION, SOLUTION, CONCENTRATE INTRAVENOUS at 06:11

## 2023-05-30 RX ADMIN — OXYCODONE HYDROCHLORIDE 10 MG: 10 TABLET ORAL at 10:24

## 2023-05-30 RX ADMIN — ANTACID TABLETS 500 MG: 500 TABLET, CHEWABLE ORAL at 17:32

## 2023-05-30 RX ADMIN — SODIUM PHOSPHATE, MONOBASIC, MONOHYDRATE AND SODIUM PHOSPHATE, DIBASIC, ANHYDROUS 30 MMOL: 276; 142 INJECTION, SOLUTION INTRAVENOUS at 18:40

## 2023-05-30 RX ADMIN — PAROXETINE HYDROCHLORIDE 60 MG: 20 TABLET, FILM COATED ORAL at 17:32

## 2023-05-30 RX ADMIN — ANTACID TABLETS 500 MG: 500 TABLET, CHEWABLE ORAL at 12:27

## 2023-05-30 RX ADMIN — OXYCODONE HYDROCHLORIDE 10 MG: 10 TABLET ORAL at 19:15

## 2023-05-30 RX ADMIN — OXYCODONE HYDROCHLORIDE 10 MG: 10 TABLET ORAL at 14:49

## 2023-05-30 RX ADMIN — ENOXAPARIN SODIUM 40 MG: 100 INJECTION SUBCUTANEOUS at 17:32

## 2023-05-30 RX ADMIN — ANTACID TABLETS 500 MG: 500 TABLET, CHEWABLE ORAL at 05:58

## 2023-05-30 ASSESSMENT — COGNITIVE AND FUNCTIONAL STATUS - GENERAL
WALKING IN HOSPITAL ROOM: TOTAL
MOBILITY SCORE: 11
SUGGESTED CMS G CODE MODIFIER MOBILITY: CL
MOVING TO AND FROM BED TO CHAIR: A LITTLE
CLIMB 3 TO 5 STEPS WITH RAILING: TOTAL
STANDING UP FROM CHAIR USING ARMS: A LOT
TURNING FROM BACK TO SIDE WHILE IN FLAT BAD: A LITTLE
MOVING FROM LYING ON BACK TO SITTING ON SIDE OF FLAT BED: UNABLE

## 2023-05-30 ASSESSMENT — FIBROSIS 4 INDEX: FIB4 SCORE: 0.56

## 2023-05-30 ASSESSMENT — ENCOUNTER SYMPTOMS
CONSTIPATION: 0
SORE THROAT: 1
MYALGIAS: 1
WEAKNESS: 1
VOMITING: 0
NERVOUS/ANXIOUS: 0
SHORTNESS OF BREATH: 0
ABDOMINAL PAIN: 0

## 2023-05-30 ASSESSMENT — GAIT ASSESSMENTS: GAIT LEVEL OF ASSIST: UNABLE TO PARTICIPATE

## 2023-05-30 NOTE — DISCHARGE PLANNING
Case Management Discharge Planning    Admission Date: 5/25/2023  GMLOS: 4.3  ALOS: 5    6-Clicks ADL Score: 15  6-Clicks Mobility Score: 10  PT and/or OT Eval ordered: Yes  Post-acute Referrals Ordered: Yes  Post-acute Choice Obtained: Yes  Has referral(s) been sent to post-acute provider:  Yes      Anticipated Discharge Dispo: Discharge Disposition: D/T to SNF with Medicare cert in anticipation of skilled care (03)    DME Needed: No    Action(s) Taken: Choice obtained    Escalations Completed: None    Medically Clear: No    Next Steps:f/u with pt and medical team to discuss dc needs and barriers.  Assessment to be completed to assess for HCM needs.    Barriers to Discharge: Medical clearance and Pending Placement    Care Transition Team Assessment    Information Source  Orientation Level: Oriented X4  Information Given By: Patient  Who is responsible for making decisions for patient? : Patient    Readmission Evaluation  Is this a readmission?: No    Elopement Risk  Legal Hold: No  Ambulatory or Self Mobile in Wheelchair: No-Not an Elopement Risk  Elopement Risk: Not at Risk for Elopement    Interdisciplinary Discharge Planning  Does Admitting Nurse Feel This Could be a Complex Discharge?: No  Primary Care Physician:  (Critical access hospital)  Lives with - Patient's Self Care Capacity: Adult Children, Child Less than 18 Years of Age  Patient or legal guardian wants to designate a caregiver: No  Support Systems: Children (lives with her 4 adult children)  Housing / Facility: 1 Story House (1 step to enter)  Able to Return to Previous ADL's: Future Time w/Therapy  Mobility Issues: Yes  Prior Services: None  Patient Prefers to be Discharged to:: Rehab  Assistance Needed: Yes  Durable Medical Equipment: Walker, Other - Specify (cane, WC)    Discharge Preparedness  What is your plan after discharge?: Skilled nursing facility  What are your discharge supports?: Child (Lobo, Nini, Arianna, Efrain)  Prior Functional  Level: Independent with Activities of Daily Living, Independent with Medication Management    Functional Assesment  Prior Functional Level: Independent with Activities of Daily Living, Independent with Medication Management    Finances  Financial Barriers to Discharge: No  Prescription Coverage: Yes    Vision / Hearing Impairment  Right Eye Vision: Wears Glasses  Left Eye Vision: Wears Glasses    Advance Directive  Advance Directive?: None    Domestic Abuse  Have you ever been the victim of abuse or violence?: No  Physical Abuse or Sexual Abuse: No  Verbal Abuse or Emotional Abuse: No  Possible Abuse/Neglect Reported to:: Not Applicable    Psychological Assessment  History of Substance Abuse: None  History of Psychiatric Problems: Yes    Discharge Risks or Barriers  Discharge risks or barriers?: Mental health, Complex medical needs  Patient risk factors: Complex medical needs, Multiple ED visits, Polypharmacy (>7 prescriptions), Vulnerable adult    Anticipated Discharge Information  Discharge Disposition: D/T to SNF with Medicare cert in anticipation of skilled care (03)

## 2023-05-30 NOTE — THERAPY
Physical Therapy   Daily Treatment     Patient Name: Mary Martinez  Age:  53 y.o., Sex:  female  Medical Record #: [id number]  Today's Date: 5/30/2023     Precautions  Precautions: Fall Risk;Swallow Precautions    Assessment  Pt remains pleasant and agreeable throughout session, but demonstrates low activity tolerance given severity of deconditioning and persistent immobility. Encouraged sitting EOB 3x/day for meals to improve upright activity tolerance, and addition of bridges to HEP for LE strengthening and loading/weight bearing w/out exacerbating RA. Will continue to follow to progress functional mobility, and recommend D/C to post-acute rehab once medically appropriate.     Plan    Treatment Plan Status: Continue Current Treatment Plan  Type of Treatment: Bed Mobility, Gait Training, Neuro Re-Education / Balance, Self Care / Home Evaluation, Stair Training, Therapeutic Activities, Therapeutic Exercise  Treatment Frequency: 4 Times per Week  Treatment Duration: Until Therapy Goals Met    DC Equipment Recommendations: Unable to determine at this time  Discharge Recommendations: Recommend post-acute placement for additional physical therapy services prior to discharge home      Subjective    Pt reported not having much of an appetite, and being tired. Pt reported moderate dizziness while sitting EOB. Pt states she used to be on RA medication, but unable to afford them when her insurance got unintentionally dropped several months ago.      Objective       05/30/23 1100   Precautions   Precautions Fall Risk;Swallow Precautions   Vitals   Patient BP Position Supine   Blood Pressure 99/70   O2 Delivery Device None - Room Air   Vitals Comments /71 while sitting EOB   Pain   Pain Scales 0 to 10 Scale    Pain 0 - 10 Group   Location Ankle  (reported due to RA)   Location Orientation Left;Right   Cognition    Comments pleasant and cooperative   Supine Lower Body Exercise   Supine Lower Body Exercises Yes    Comments 1x10 bridges, mild-mod difficulties noted due to weakness   Sitting Lower Body Exercises   Sitting Lower Body Exercises Yes   Comments 5x ea seated marches, LAQ, and ankle pumps. Extremely quick to fatigue   Balance   Sitting Balance (Static) Fair   Bed Mobility    Supine to Sit Supervised  (use of rail)   Sit to Supine Supervised   Gait Analysis   Gait Level Of Assist Unable to Participate   Functional Mobility   Sit to Stand Unable to Participate  (Pt declined attempt due to weakness, fear of falling, and pain in ankles from RA)   How much difficulty does the patient currently have...   Turning over in bed (including adjusting bedclothes, sheets and blankets)? 3   Sitting down on and standing up from a chair with arms (e.g., wheelchair, bedside commode, etc.) 1   Moving from lying on back to sitting on the side of the bed? 3   How much help from another person does the patient currently need...   Moving to and from a bed to a chair (including a wheelchair)? 2   Need to walk in a hospital room? 1   Climbing 3-5 steps with a railing? 1   6 clicks Mobility Score 11   Short Term Goals    Short Term Goal # 1 pt will perform supine <> sit without bed features with SPV in 6 visits to get in/out of bed at home   Goal Outcome # 1 Progressing as expected   Short Term Goal # 2 pt will perform all functional xfrs with SPV in 6 visits for improved independence   Goal Outcome # 2 Progressing slower than expected   Short Term Goal # 3 pt will ambulate 50ft with FWW and SPV in 6 visits for improved independence   Goal Outcome # 3 Progressing slower than expected   Short Term Goal # 4 pt will negotiate 1 step with SPV in 6 visits to access home   Goal Outcome # 4 Progressing slower than expected   Education Group   Additional Comments Discussed importance of upright positioning, sitting EOB 3x/day to decrease occurrence of dizziness w/position changes (BP stable this visit). Discussed benefits of bridges for HEP to  improve LE strength, allow non-painful loading/weight bearing through joints, but to make sure tube feeds are paused while completing. DIscussed importance of nutrition and energy on improving strength/activity tolerance. Discussed SNF upon D/C   Physical Therapy Treatment Plan   Physical Therapy Treatment Plan Continue Current Treatment Plan   Anticipated Discharge Equipment and Recommendations   DC Equipment Recommendations Unable to determine at this time   Discharge Recommendations Recommend post-acute placement for additional physical therapy services prior to discharge home   Interdisciplinary Plan of Care Collaboration   IDT Collaboration with  Nursing   Patient Position at End of Therapy In Bed;Tray Table within Reach;Phone within Reach;Call Light within Reach   Session Information   Date / Session Number  5/30- 2(2/4, 6/2)

## 2023-05-30 NOTE — PROGRESS NOTES
"  DATE: 5/30/2023    HD 5 Malnutrition with Refeeding Syndrome    INTERVAL EVENTS:  Remains on half goal tube feeds (20cc/hr).  Tolerating this, having loos bowel movements  No appetite  Still having electrolyte issues.  Complaining about having the nasoenteric tube - explained the issues related to wound healing due to her low protein levels     PHYSICAL EXAMINATION:    Vital Signs: /66   Pulse 65   Temp 37 °C (98.6 °F) (Oral)   Resp 18   Ht 1.6 m (5' 2.99\")   Wt 38.2 kg (84 lb 3.5 oz)   SpO2 97%   GENERAL:  No acute distress, cachectic  CHEST:  Lungs are clear to auscultation bilaterally  CARDIOVASCULAR:  Regular rate and rhythm  ABDOMEN: Soft, non-tender, non-distended  EXTREMITIES:  Good range of motion through out  NEUROLOGIC:  Alert and oriented.  Cranial Nerves II through XII are grossly intact, no focal deficits appreciated  PSYCHIATRIC:  Normal affect and mood appropriate for age and condition  SKIN:  Warm and well perfused, no rashes    LABORATORY VALUES:   Recent Labs     05/28/23  0254 05/29/23  0027   WBC 7.9 7.7   RBC 2.70* 2.99*   HEMOGLOBIN 7.8* 8.6*   HEMATOCRIT 23.6* 25.6*   MCV 87.4 85.6   MCH 28.9 28.8   MCHC 33.1 33.6   RDW 65.7* 63.7*   PLATELETCT 276 301   MPV 10.1 9.9     Recent Labs     05/29/23  1257 05/29/23  2058 05/30/23  0900   SODIUM 140 140 138   POTASSIUM 3.6 3.7 4.1   CHLORIDE 104 105 105   CO2 25 25 21   GLUCOSE 127* 138* 145*   BUN 8 10 9   CREATININE 0.96 1.18 1.23   CALCIUM 7.8* 7.7* 8.0*     Recent Labs     05/28/23  0254   ASTSGOT 7*   ALTSGPT <5   TBILIRUBIN 0.2   ALKPHOSPHAT 127*   GLOBULIN 2.9            IMAGING:   DX-ABDOMEN FOR TUBE PLACEMENT   Final Result      Nasogastric tube is now satisfactorily with the stomach.      DX-ABDOMEN FOR TUBE PLACEMENT   Final Result      Nasogastric tube side-port is in the distal esophagus. Recommend advancement.      IR-MIDLINE CATHETER INSERTION WO GUIDANCE > AGE 3    (Results Pending)       ASSESSMENT AND PLAN:   1) " Severe Protein Calorie Malnutrition with Refeeding Syndrome:    Discussed with Dr. Arana  Would recommend she be tolerating goal tube feeds (45cc/hr) for one week without significant electrolyte dyscrasias before any feeding access be attempted  Also recommend working from least invasive to most invasive - GI re-eval for PEG-J followed by surgical J-tube if unable to perform.  Given her previous perforated ulcer requiring a laparotomy, laparoscopy could be high risk, so she may need a repeat laparotomy for creating a J-tube.    Will sign off for now.       ____________________________________     Yogesh Philip M.D.

## 2023-05-30 NOTE — PROGRESS NOTES
Hospital Medicine Daily Progress Note    Date of Service  5/30/2023    Chief Complaint  Mary Martinez is a 53 y.o. female admitted 5/25/2023 with weakness    Hospital Course  52 yo woman with PMH bipolar disorder who presented with confusion, weakness and difficulty eating. She was admitted for thrush, starvation ketosis, HASMUKH with UTI. She was hospitalized for similar situation before, had NGT at that time. NGT was placed and she developed refeeding syndrome.    Interval Problem Update  She is currently at 20ml/h tube feeds  She is having a sore throat, she thinks from the NG tube  She does not have much of an appetite, does not feel like eating  Spoke with Dr. Philip, G-tube placement may be difficult due to her prior abdominal surgeries.  He recommends we discussing with GI if they can place a PEG once she is at goal on tube feeds and no further signs of refeeding syndrome    I have discussed this patient's plan of care and discharge plan at IDT rounds today with Case Management, Nursing, Nursing leadership, and other members of the IDT team.    Consultants/Specialty  general surgery and GI    Code Status  DNAR/DNI    Disposition  The patient is not medically cleared for discharge to home or a post-acute facility.  Anticipate discharge to: skilled nursing facility    I have placed the appropriate orders for post-discharge needs.    Review of Systems  Review of Systems   Constitutional:  Positive for malaise/fatigue.   HENT:  Positive for sore throat.    Respiratory:  Negative for shortness of breath.    Cardiovascular:  Negative for chest pain.   Gastrointestinal:  Negative for abdominal pain, constipation and vomiting.   Musculoskeletal:  Positive for myalgias.   Neurological:  Positive for weakness.   Psychiatric/Behavioral:  The patient is not nervous/anxious.         Physical Exam  Temp:  [37 °C (98.6 °F)] 37 °C (98.6 °F)  Pulse:  [65-78] 65  Resp:  [18] 18  BP: ()/(63-73) 99/70  SpO2:  [93 %-97  %] 97 %    Physical Exam  Vitals and nursing note reviewed.   Constitutional:       Appearance: She is ill-appearing.      Comments: Cachectic   HENT:      Head:      Comments: Temporal wasting     Mouth/Throat:      Mouth: Mucous membranes are dry.   Eyes:      General:         Right eye: No discharge.         Left eye: No discharge.   Cardiovascular:      Rate and Rhythm: Normal rate and regular rhythm.   Pulmonary:      Effort: Pulmonary effort is normal. No respiratory distress.      Breath sounds: No wheezing or rales.   Abdominal:      General: There is no distension.      Palpations: Abdomen is soft.      Tenderness: There is no abdominal tenderness.   Musculoskeletal:         General: No swelling.      Cervical back: Neck supple.      Comments: Diffuse muscle atrophy   Skin:     General: Skin is warm and dry.      Coloration: Skin is pale.   Neurological:      Mental Status: She is alert and oriented to person, place, and time.         Fluids    Intake/Output Summary (Last 24 hours) at 5/30/2023 1518  Last data filed at 5/30/2023 1200  Gross per 24 hour   Intake 1040 ml   Output 1350 ml   Net -310 ml       Laboratory  Recent Labs     05/28/23  0254 05/29/23  0027   WBC 7.9 7.7   RBC 2.70* 2.99*   HEMOGLOBIN 7.8* 8.6*   HEMATOCRIT 23.6* 25.6*   MCV 87.4 85.6   MCH 28.9 28.8   MCHC 33.1 33.6   RDW 65.7* 63.7*   PLATELETCT 276 301   MPV 10.1 9.9     Recent Labs     05/29/23  1257 05/29/23  2058 05/30/23  0900   SODIUM 140 140 138   POTASSIUM 3.6 3.7 4.1   CHLORIDE 104 105 105   CO2 25 25 21   GLUCOSE 127* 138* 145*   BUN 8 10 9   CREATININE 0.96 1.18 1.23   CALCIUM 7.8* 7.7* 8.0*                   Imaging  DX-ABDOMEN FOR TUBE PLACEMENT   Final Result      Nasogastric tube is now satisfactorily with the stomach.      DX-ABDOMEN FOR TUBE PLACEMENT   Final Result      Nasogastric tube side-port is in the distal esophagus. Recommend advancement.           Assessment/Plan  * High anion gap metabolic acidosis-  (present on admission)  Assessment & Plan  Due to starvation ketosis  Lactic acid negative  Resolved  Continue to follow bmp        UTI (urinary tract infection)- (present on admission)  Assessment & Plan  With urinary urgency   Sx now resolved  Completed tx    Thrush- (present on admission)  Assessment & Plan  Hiv negative  Continue nystatin  Speech to follow    Failure to thrive in adult- (present on admission)  Assessment & Plan  Severe malnutrition and muscle wasting, cachexia  Nutrition consulted  Aggressive IV fluid hydration with correction of electrolytes  Discussed with surgery and GI  Spoke with Dr. Philip, G-tube placement may be difficult due to her prior abdominal surgeries.  He recommends we discussing with GI if they can place a PEG once she is at goal on tube feeds and no further signs of refeeding syndrome    Hypocalcemia- (present on admission)  Assessment & Plan  Some improvement  Will continue replacement with tums  Monitor level daily    Anemia  Assessment & Plan  Acute on chronic  Currently hemodilution and frequent blood draws contributing - trying to limit  Following, Hgb has been stable    Bipolar 1 disorder (HCC)- (present on admission)  Assessment & Plan  Continue Paxil and Seroquel    Starvation ketoacidosis- (present on admission)  Assessment & Plan  Improving  following    Severe malnutrition (HCC)  Assessment & Plan  See above  She remains very high risk for refeeding syndrome, will continue to follow very closely with serial electrolytes  Continue tube feeds with plan for feeding tube placement  Slow increase of tube feeds because of refeeding syndrome, goal 45ml/h    SMAS (superior mesenteric artery syndrome) c/b feeding issues (HCC)- (present on admission)  Assessment & Plan  Contributing to severe malnutrition, chronic abdominal pain    Unintentional weight loss- (present on admission)  Assessment & Plan  Related to SMA and inability to keep food down, see above, addressing  Hiv  and lipase negative  Malnutrition contributing    Chronic pain- (present on admission)  Assessment & Plan  Patient on home oxycodone but narcotics contributing to her chronic emesis, she is having increased pain and may need a very slow taper  Continue Paxil    Hypophosphatemia- (present on admission)  Assessment & Plan  Replaced  Resolved but high risk for fluctuation and remains high risk for refeeding syndrome  Continue to follow level daily      HASMUKH (acute kidney injury) (HCC)- (present on admission)  Assessment & Plan  Resolved  Due to dehydration  Following BMP  Avoid nephrotoxic medications      Hypokalemia- (present on admission)  Assessment & Plan  Markedly improved  Continue potassium through IV fluids, monitor level daily    Rheumatoid arthritis (HCC)- (present on admission)  Assessment & Plan  With chronic deformity and s/p amputations  No evidence of acute flair  Following  Will continue baseline opiates but tapering off iv dilaudid  PT OT    Tobacco dependence- (present on admission)  Assessment & Plan  Cessation discussed and recommended > 3 min    Pancreatitis- (present on admission)  Assessment & Plan  H/o  Lipase negative    Hyponatremia- (present on admission)  Assessment & Plan  Sodium 133 on admission  Likely due to severe dehydration  Now resolved  Will continue to follow         VTE prophylaxis: enoxaparin ppx    I have performed a physical exam and reviewed and updated ROS and Plan today (5/30/2023). In review of yesterday's note (5/29/2023), there are no changes except as documented above.

## 2023-05-30 NOTE — DISCHARGE PLANNING
Received choice form at: [phone]  Agency/Facility name: [facility]  Referral sent per choice form at:  [phone]

## 2023-05-30 NOTE — CARE PLAN
The patient is Stable - Low risk of patient condition declining or worsening    Shift Goals  Clinical Goals: pain control  Patient Goals: pain control, rest  Family Goals: n/a    Progress made toward(s) clinical / shift goals:    Problem: Pain - Standard  Goal: Alleviation of pain or a reduction in pain to the patient’s comfort goal  Outcome: Progressing     Problem: Knowledge Deficit - Standard  Goal: Patient and family/care givers will demonstrate understanding of plan of care, disease process/condition, diagnostic tests and medications  Outcome: Progressing     Problem: Fall Risk  Goal: Patient will remain free from falls  Outcome: Progressing     Problem: Skin Integrity  Goal: Skin integrity is maintained or improved  Outcome: Progressing

## 2023-05-31 ENCOUNTER — APPOINTMENT (OUTPATIENT)
Dept: PHYSICAL MEDICINE AND REHAB | Facility: MEDICAL CENTER | Age: 53
End: 2023-05-31
Payer: MEDICAID

## 2023-05-31 ENCOUNTER — APPOINTMENT (OUTPATIENT)
Dept: RADIOLOGY | Facility: MEDICAL CENTER | Age: 53
DRG: 682 | End: 2023-05-31
Attending: INTERNAL MEDICINE
Payer: MEDICAID

## 2023-05-31 LAB
ANION GAP SERPL CALC-SCNC: 13 MMOL/L (ref 7–16)
ANION GAP SERPL CALC-SCNC: 14 MMOL/L (ref 7–16)
ANION GAP SERPL CALC-SCNC: 8 MMOL/L (ref 7–16)
BUN SERPL-MCNC: 11 MG/DL (ref 8–22)
CALCIUM SERPL-MCNC: 7.7 MG/DL (ref 8.5–10.5)
CALCIUM SERPL-MCNC: 7.7 MG/DL (ref 8.5–10.5)
CALCIUM SERPL-MCNC: 7.8 MG/DL (ref 8.5–10.5)
CHLORIDE SERPL-SCNC: 103 MMOL/L (ref 96–112)
CHLORIDE SERPL-SCNC: 105 MMOL/L (ref 96–112)
CHLORIDE SERPL-SCNC: 105 MMOL/L (ref 96–112)
CO2 SERPL-SCNC: 20 MMOL/L (ref 20–33)
CO2 SERPL-SCNC: 22 MMOL/L (ref 20–33)
CO2 SERPL-SCNC: 24 MMOL/L (ref 20–33)
CREAT SERPL-MCNC: 1.35 MG/DL (ref 0.5–1.4)
CREAT SERPL-MCNC: 1.42 MG/DL (ref 0.5–1.4)
CREAT SERPL-MCNC: 1.44 MG/DL (ref 0.5–1.4)
GFR SERPLBLD CREATININE-BSD FMLA CKD-EPI: 44 ML/MIN/1.73 M 2
GFR SERPLBLD CREATININE-BSD FMLA CKD-EPI: 45 ML/MIN/1.73 M 2
GFR SERPLBLD CREATININE-BSD FMLA CKD-EPI: 48 ML/MIN/1.73 M 2
GLUCOSE SERPL-MCNC: 120 MG/DL (ref 65–99)
GLUCOSE SERPL-MCNC: 121 MG/DL (ref 65–99)
GLUCOSE SERPL-MCNC: 99 MG/DL (ref 65–99)
MAGNESIUM SERPL-MCNC: 1.6 MG/DL (ref 1.5–2.5)
MAGNESIUM SERPL-MCNC: 1.6 MG/DL (ref 1.5–2.5)
MAGNESIUM SERPL-MCNC: 2.3 MG/DL (ref 1.5–2.5)
PHOSPHATE SERPL-MCNC: 4.9 MG/DL (ref 2.5–4.5)
PHOSPHATE SERPL-MCNC: 4.9 MG/DL (ref 2.5–4.5)
PHOSPHATE SERPL-MCNC: 5.6 MG/DL (ref 2.5–4.5)
POTASSIUM SERPL-SCNC: 3.4 MMOL/L (ref 3.6–5.5)
POTASSIUM SERPL-SCNC: 3.4 MMOL/L (ref 3.6–5.5)
POTASSIUM SERPL-SCNC: 3.7 MMOL/L (ref 3.6–5.5)
SODIUM SERPL-SCNC: 136 MMOL/L (ref 135–145)
SODIUM SERPL-SCNC: 137 MMOL/L (ref 135–145)
SODIUM SERPL-SCNC: 141 MMOL/L (ref 135–145)

## 2023-05-31 PROCEDURE — A9270 NON-COVERED ITEM OR SERVICE: HCPCS | Performed by: INTERNAL MEDICINE

## 2023-05-31 PROCEDURE — A9270 NON-COVERED ITEM OR SERVICE: HCPCS | Performed by: HOSPITALIST

## 2023-05-31 PROCEDURE — A9270 NON-COVERED ITEM OR SERVICE: HCPCS | Performed by: EMERGENCY MEDICINE

## 2023-05-31 PROCEDURE — 80048 BASIC METABOLIC PNL TOTAL CA: CPT

## 2023-05-31 PROCEDURE — 700102 HCHG RX REV CODE 250 W/ 637 OVERRIDE(OP): Performed by: INTERNAL MEDICINE

## 2023-05-31 PROCEDURE — 700102 HCHG RX REV CODE 250 W/ 637 OVERRIDE(OP): Performed by: HOSPITALIST

## 2023-05-31 PROCEDURE — 99232 SBSQ HOSP IP/OBS MODERATE 35: CPT | Performed by: INTERNAL MEDICINE

## 2023-05-31 PROCEDURE — 700111 HCHG RX REV CODE 636 W/ 250 OVERRIDE (IP): Performed by: INTERNAL MEDICINE

## 2023-05-31 PROCEDURE — 770001 HCHG ROOM/CARE - MED/SURG/GYN PRIV*

## 2023-05-31 PROCEDURE — 700102 HCHG RX REV CODE 250 W/ 637 OVERRIDE(OP): Performed by: EMERGENCY MEDICINE

## 2023-05-31 PROCEDURE — 36415 COLL VENOUS BLD VENIPUNCTURE: CPT

## 2023-05-31 PROCEDURE — 84100 ASSAY OF PHOSPHORUS: CPT

## 2023-05-31 PROCEDURE — C1751 CATH, INF, PER/CENT/MIDLINE: HCPCS

## 2023-05-31 PROCEDURE — 83735 ASSAY OF MAGNESIUM: CPT

## 2023-05-31 RX ORDER — MAGNESIUM SULFATE HEPTAHYDRATE 40 MG/ML
2 INJECTION, SOLUTION INTRAVENOUS ONCE
Status: COMPLETED | OUTPATIENT
Start: 2023-05-31 | End: 2023-05-31

## 2023-05-31 RX ADMIN — QUETIAPINE FUMARATE 100 MG: 100 TABLET ORAL at 17:15

## 2023-05-31 RX ADMIN — OXYCODONE HYDROCHLORIDE 10 MG: 10 TABLET ORAL at 04:15

## 2023-05-31 RX ADMIN — NYSTATIN 500000 UNITS: 100000 SUSPENSION ORAL at 17:15

## 2023-05-31 RX ADMIN — OXYCODONE HYDROCHLORIDE 10 MG: 10 TABLET ORAL at 08:49

## 2023-05-31 RX ADMIN — OXYCODONE HYDROCHLORIDE 10 MG: 10 TABLET ORAL at 17:15

## 2023-05-31 RX ADMIN — HYDROMORPHONE HYDROCHLORIDE 0.25 MG: 1 INJECTION, SOLUTION INTRAMUSCULAR; INTRAVENOUS; SUBCUTANEOUS at 19:18

## 2023-05-31 RX ADMIN — ENOXAPARIN SODIUM 40 MG: 100 INJECTION SUBCUTANEOUS at 17:16

## 2023-05-31 RX ADMIN — PAROXETINE HYDROCHLORIDE 60 MG: 20 TABLET, FILM COATED ORAL at 17:15

## 2023-05-31 RX ADMIN — NYSTATIN 500000 UNITS: 100000 SUSPENSION ORAL at 11:51

## 2023-05-31 RX ADMIN — Medication 100 MG: at 04:14

## 2023-05-31 RX ADMIN — OMEPRAZOLE AND SODIUM BICARBONATE 40 MG: KIT at 17:16

## 2023-05-31 RX ADMIN — MAGNESIUM SULFATE HEPTAHYDRATE 2 G: 40 INJECTION, SOLUTION INTRAVENOUS at 13:58

## 2023-05-31 RX ADMIN — HYDROMORPHONE HYDROCHLORIDE 0.25 MG: 1 INJECTION, SOLUTION INTRAMUSCULAR; INTRAVENOUS; SUBCUTANEOUS at 15:43

## 2023-05-31 RX ADMIN — DOCUSATE SODIUM 50 MG AND SENNOSIDES 8.6 MG 2 TABLET: 8.6; 5 TABLET, FILM COATED ORAL at 17:15

## 2023-05-31 RX ADMIN — OXYCODONE HYDROCHLORIDE 10 MG: 10 TABLET ORAL at 12:54

## 2023-05-31 RX ADMIN — NYSTATIN 500000 UNITS: 100000 SUSPENSION ORAL at 20:37

## 2023-05-31 RX ADMIN — NYSTATIN 500000 UNITS: 100000 SUSPENSION ORAL at 08:49

## 2023-05-31 ASSESSMENT — COGNITIVE AND FUNCTIONAL STATUS - GENERAL
DAILY ACTIVITIY SCORE: 15
MOVING TO AND FROM BED TO CHAIR: A LITTLE
STANDING UP FROM CHAIR USING ARMS: A LOT
CLIMB 3 TO 5 STEPS WITH RAILING: TOTAL
MOBILITY SCORE: 11
SUGGESTED CMS G CODE MODIFIER MOBILITY: CL
TOILETING: A LOT
TURNING FROM BACK TO SIDE WHILE IN FLAT BAD: A LITTLE
DRESSING REGULAR UPPER BODY CLOTHING: A LITTLE
EATING MEALS: A LITTLE
PERSONAL GROOMING: A LITTLE
HELP NEEDED FOR BATHING: A LOT
WALKING IN HOSPITAL ROOM: TOTAL
DRESSING REGULAR LOWER BODY CLOTHING: A LOT
SUGGESTED CMS G CODE MODIFIER DAILY ACTIVITY: CK
MOVING FROM LYING ON BACK TO SITTING ON SIDE OF FLAT BED: UNABLE

## 2023-05-31 ASSESSMENT — PAIN DESCRIPTION - PAIN TYPE
TYPE: ACUTE PAIN

## 2023-05-31 NOTE — CARE PLAN
The patient is Stable - Low risk of patient condition declining or worsening    Shift Goals  Clinical Goals: pain control  Patient Goals: pain control, rest  Family Goals: n/a    Progress made toward(s) clinical / shift goals:    Problem: Knowledge Deficit - Standard  Goal: Patient and family/care givers will demonstrate understanding of plan of care, disease process/condition, diagnostic tests and medications  Outcome: Progressing     Problem: Fall Risk  Goal: Patient will remain free from falls  Outcome: Progressing

## 2023-05-31 NOTE — PROGRESS NOTES
4 Eyes Skin Assessment Completed by LEANNE cruz and LEANNE garcia.    Head WDL  Ears WDL  Nose WDL  Mouth Discoloration  Neck WDL  Breast/Chest WDL  Shoulder Blades WDL  Spine WDL  (R) Arm/Elbow/Hand WDL  (L) Arm/Elbow/Hand Swelling and Edema  Abdomen WDL  Groin WDL  Scrotum/Coccyx/Buttocks Redness and Blanching  (R) Leg WDL  (L) Leg WDL  (R) Heel/Foot/Toe Blanching, Discoloration, and Scab Heels flaky/dry. blanching. Discoloration between toes, brown. Right great toe redness, nonblanching. Scab/sore.  (L) Heel/Foot/Toe Blanching, Discoloration, and Scab blanching/dry heels. Discoloration/scab between toes          Devices In Places Blood Pressure Cuff and OG/NG      Interventions In Place Sacral Mepilex, Waffle Overlay, Pillows, Q2 Turns, and Heels Loaded W/Pillows    Possible Skin Injury Yes    Pictures Uploaded Into Epic Yes  Wound Consult Placed N/A  RN Wound Prevention Protocol Ordered Yes

## 2023-05-31 NOTE — PROCEDURES
Vascular Access Team    Date of Insertion: 5/31/2023  Arm Circumference: n/a  Line Length: 10  Line Size: 20  Vein Occupancy %: 45  Reason for Midline: access  Labs: WBC 7.7, , BUN 1.42, Cr 11, GFR 45, INR n/a    Orders confirmed, vessel patency confirmed with ultrasound. Risks and benefits of procedure explained to patient and education regarding line associated bloodstream infections provided. Questions answered.     PowerGlide Midline placed in RUE per licensed provider order with ultrasound guidance. 20g, 10 cm line placed in basilic vein after 1 attempt(s).  Catheter inserted with brisk blood return. Secured with 0cm external from insertion site.  Line flushed without resistance with 10 mL 0.9% normal saline.  Midline secured with Biopatch and Tegaderm.     Midline placement is confirmed by nurse using ultrasound and ability to flush and draw blood. Midline is appropriate for use at this time.  No X-ray is needed for placement confirmation. Pt tolerated procedure well.  Patient condition relayed to unit RN or ordering physician via this post procedure note in the EMR.    Ultrasound images uploaded to PACS and viewable in the EMR - yes  Ultrasound imaged printed and placed in paper chart - no      BARD PowerGlide Midline ref # [id number], Lot # [id number], Expiration Date 02/29/2024

## 2023-05-31 NOTE — PROGRESS NOTES
Pt arrived to unit with all belongings  AAOx4  RA  NGT in place. Feeds running at 20ml/hr. To increase to 25 at 0600.  Denies n/v  POC reviewed  Call light within reach    PIV to Left FA, edema noted. Pt denies pain. States she has been swollen.  Ivs stopped this AM d/t edema increasing. IV appears infiltrated, however pt still denies pain at site or arm.  Midline insertion was cancelled 5/30, will pass on to day team possibility of reevaluating need for VAT team to place.    0600- Tube feed increased to 25 this Am. Next increase 6/1 at 0600

## 2023-06-01 LAB
ANION GAP SERPL CALC-SCNC: 10 MMOL/L (ref 7–16)
ANION GAP SERPL CALC-SCNC: 12 MMOL/L (ref 7–16)
BUN SERPL-MCNC: 11 MG/DL (ref 8–22)
BUN SERPL-MCNC: 9 MG/DL (ref 8–22)
CALCIUM SERPL-MCNC: 7.8 MG/DL (ref 8.5–10.5)
CALCIUM SERPL-MCNC: 8.1 MG/DL (ref 8.5–10.5)
CHLORIDE SERPL-SCNC: 104 MMOL/L (ref 96–112)
CHLORIDE SERPL-SCNC: 107 MMOL/L (ref 96–112)
CO2 SERPL-SCNC: 21 MMOL/L (ref 20–33)
CO2 SERPL-SCNC: 22 MMOL/L (ref 20–33)
CREAT SERPL-MCNC: 1.25 MG/DL (ref 0.5–1.4)
CREAT SERPL-MCNC: 1.26 MG/DL (ref 0.5–1.4)
ERYTHROCYTE [DISTWIDTH] IN BLOOD BY AUTOMATED COUNT: 69 FL (ref 35.9–50)
GFR SERPLBLD CREATININE-BSD FMLA CKD-EPI: 52 ML/MIN/1.73 M 2
GFR SERPLBLD CREATININE-BSD FMLA CKD-EPI: 52 ML/MIN/1.73 M 2
GLUCOSE SERPL-MCNC: 123 MG/DL (ref 65–99)
GLUCOSE SERPL-MCNC: 93 MG/DL (ref 65–99)
HCT VFR BLD AUTO: 27.5 % (ref 37–47)
HGB BLD-MCNC: 8.8 G/DL (ref 12–16)
MAGNESIUM SERPL-MCNC: 1.8 MG/DL (ref 1.5–2.5)
MAGNESIUM SERPL-MCNC: 2 MG/DL (ref 1.5–2.5)
MAGNESIUM SERPL-MCNC: 2 MG/DL (ref 1.5–2.5)
MCH RBC QN AUTO: 28.9 PG (ref 27–33)
MCHC RBC AUTO-ENTMCNC: 32 G/DL (ref 32.2–35.5)
MCV RBC AUTO: 90.2 FL (ref 81.4–97.8)
PHOSPHATE SERPL-MCNC: 3.5 MG/DL (ref 2.5–4.5)
PHOSPHATE SERPL-MCNC: 3.7 MG/DL (ref 2.5–4.5)
PHOSPHATE SERPL-MCNC: 3.8 MG/DL (ref 2.5–4.5)
PLATELET # BLD AUTO: 319 K/UL (ref 164–446)
PMV BLD AUTO: 10.3 FL (ref 9–12.9)
POTASSIUM SERPL-SCNC: 3.8 MMOL/L (ref 3.6–5.5)
POTASSIUM SERPL-SCNC: 4.1 MMOL/L (ref 3.6–5.5)
RBC # BLD AUTO: 3.05 M/UL (ref 4.2–5.4)
SODIUM SERPL-SCNC: 138 MMOL/L (ref 135–145)
SODIUM SERPL-SCNC: 138 MMOL/L (ref 135–145)
WBC # BLD AUTO: 11.3 K/UL (ref 4.8–10.8)

## 2023-06-01 PROCEDURE — A9270 NON-COVERED ITEM OR SERVICE: HCPCS | Performed by: INTERNAL MEDICINE

## 2023-06-01 PROCEDURE — 700111 HCHG RX REV CODE 636 W/ 250 OVERRIDE (IP): Performed by: INTERNAL MEDICINE

## 2023-06-01 PROCEDURE — 36415 COLL VENOUS BLD VENIPUNCTURE: CPT

## 2023-06-01 PROCEDURE — 80048 BASIC METABOLIC PNL TOTAL CA: CPT

## 2023-06-01 PROCEDURE — A9270 NON-COVERED ITEM OR SERVICE: HCPCS | Performed by: HOSPITALIST

## 2023-06-01 PROCEDURE — 85027 COMPLETE CBC AUTOMATED: CPT

## 2023-06-01 PROCEDURE — A9270 NON-COVERED ITEM OR SERVICE: HCPCS | Performed by: EMERGENCY MEDICINE

## 2023-06-01 PROCEDURE — 700102 HCHG RX REV CODE 250 W/ 637 OVERRIDE(OP): Performed by: INTERNAL MEDICINE

## 2023-06-01 PROCEDURE — 97530 THERAPEUTIC ACTIVITIES: CPT | Mod: CQ

## 2023-06-01 PROCEDURE — 770001 HCHG ROOM/CARE - MED/SURG/GYN PRIV*

## 2023-06-01 PROCEDURE — 83735 ASSAY OF MAGNESIUM: CPT

## 2023-06-01 PROCEDURE — 84100 ASSAY OF PHOSPHORUS: CPT | Mod: 91

## 2023-06-01 PROCEDURE — 700102 HCHG RX REV CODE 250 W/ 637 OVERRIDE(OP): Performed by: EMERGENCY MEDICINE

## 2023-06-01 PROCEDURE — 99232 SBSQ HOSP IP/OBS MODERATE 35: CPT | Performed by: INTERNAL MEDICINE

## 2023-06-01 PROCEDURE — 700102 HCHG RX REV CODE 250 W/ 637 OVERRIDE(OP): Performed by: HOSPITALIST

## 2023-06-01 RX ORDER — OXYCODONE HYDROCHLORIDE 10 MG/1
10 TABLET ORAL EVERY 4 HOURS PRN
Status: DISCONTINUED | OUTPATIENT
Start: 2023-06-01 | End: 2023-06-10

## 2023-06-01 RX ORDER — HYDROMORPHONE HYDROCHLORIDE 1 MG/ML
0.25 INJECTION, SOLUTION INTRAMUSCULAR; INTRAVENOUS; SUBCUTANEOUS EVERY 8 HOURS PRN
Status: DISCONTINUED | OUTPATIENT
Start: 2023-06-01 | End: 2023-06-10

## 2023-06-01 RX ORDER — KETOROLAC TROMETHAMINE 30 MG/ML
15 INJECTION, SOLUTION INTRAMUSCULAR; INTRAVENOUS EVERY 6 HOURS PRN
Status: DISPENSED | OUTPATIENT
Start: 2023-06-01 | End: 2023-06-06

## 2023-06-01 RX ADMIN — PAROXETINE HYDROCHLORIDE 60 MG: 20 TABLET, FILM COATED ORAL at 16:53

## 2023-06-01 RX ADMIN — KETOROLAC TROMETHAMINE 15 MG: 30 INJECTION, SOLUTION INTRAMUSCULAR; INTRAVENOUS at 20:45

## 2023-06-01 RX ADMIN — DOCUSATE SODIUM 50 MG AND SENNOSIDES 8.6 MG 2 TABLET: 8.6; 5 TABLET, FILM COATED ORAL at 16:54

## 2023-06-01 RX ADMIN — OXYCODONE HYDROCHLORIDE 10 MG: 10 TABLET ORAL at 14:26

## 2023-06-01 RX ADMIN — NYSTATIN 500000 UNITS: 100000 SUSPENSION ORAL at 09:21

## 2023-06-01 RX ADMIN — ENOXAPARIN SODIUM 40 MG: 100 INJECTION SUBCUTANEOUS at 16:53

## 2023-06-01 RX ADMIN — OXYCODONE HYDROCHLORIDE 10 MG: 10 TABLET ORAL at 09:21

## 2023-06-01 RX ADMIN — OXYCODONE HYDROCHLORIDE 10 MG: 10 TABLET ORAL at 00:27

## 2023-06-01 RX ADMIN — OXYCODONE HYDROCHLORIDE 10 MG: 10 TABLET ORAL at 04:33

## 2023-06-01 RX ADMIN — QUETIAPINE FUMARATE 100 MG: 100 TABLET ORAL at 16:54

## 2023-06-01 RX ADMIN — LIDOCAINE HYDROCHLORIDE 5 ML: 20 SOLUTION ORAL; TOPICAL at 14:25

## 2023-06-01 RX ADMIN — HYDROMORPHONE HYDROCHLORIDE 0.25 MG: 1 INJECTION, SOLUTION INTRAMUSCULAR; INTRAVENOUS; SUBCUTANEOUS at 11:35

## 2023-06-01 RX ADMIN — OXYCODONE HYDROCHLORIDE 10 MG: 10 TABLET ORAL at 20:44

## 2023-06-01 RX ADMIN — OMEPRAZOLE AND SODIUM BICARBONATE 40 MG: KIT at 18:00

## 2023-06-01 ASSESSMENT — COGNITIVE AND FUNCTIONAL STATUS - GENERAL
MOVING TO AND FROM BED TO CHAIR: A LITTLE
TURNING FROM BACK TO SIDE WHILE IN FLAT BAD: A LITTLE
WALKING IN HOSPITAL ROOM: TOTAL
CLIMB 3 TO 5 STEPS WITH RAILING: TOTAL
MOVING FROM LYING ON BACK TO SITTING ON SIDE OF FLAT BED: UNABLE
SUGGESTED CMS G CODE MODIFIER MOBILITY: CL
MOBILITY SCORE: 11
STANDING UP FROM CHAIR USING ARMS: A LOT

## 2023-06-01 ASSESSMENT — PAIN DESCRIPTION - PAIN TYPE
TYPE: ACUTE PAIN
TYPE: ACUTE PAIN;CHRONIC PAIN
TYPE: ACUTE PAIN
TYPE: ACUTE PAIN;CHRONIC PAIN
TYPE: ACUTE PAIN;CHRONIC PAIN
TYPE: ACUTE PAIN
TYPE: ACUTE PAIN

## 2023-06-01 ASSESSMENT — GAIT ASSESSMENTS: GAIT LEVEL OF ASSIST: UNABLE TO PARTICIPATE

## 2023-06-01 NOTE — CARE PLAN
The patient is Stable - Low risk of patient condition declining or worsening    Shift Goals  Clinical Goals: NG tube feeding, PEG placement, pain mgmt  Patient Goals: pain mgmt  Family Goals: n/a    Progress made toward(s) clinical / shift goals:    Problem: Pain - Standard  Goal: Alleviation of pain or a reduction in pain to the patient’s comfort goal  Outcome: Progressing     Problem: Knowledge Deficit - Standard  Goal: Patient and family/care givers will demonstrate understanding of plan of care, disease process/condition, diagnostic tests and medications  Outcome: Progressing     Problem: Fall Risk  Goal: Patient will remain free from falls  Outcome: Progressing     Problem: Skin Integrity  Goal: Skin integrity is maintained or improved  Outcome: Progressing

## 2023-06-01 NOTE — THERAPY
Physical Therapy   Daily Treatment     Patient Name: Mary Martinez  Age:  53 y.o., Sex:  female  Medical Record #: [id number]  Today's Date: 6/1/2023     Precautions  Precautions: Fall Risk;Swallow Precautions    Assessment    Pt greeted and seen for PT treatment. Pt was able to move supine>sitting EOB w/ SPV and scoot at EOB. Pt agreeable to trial standing, however with feet flat on the floor pt pt declined due to pain. Pt performed some seated therex before becoming dizzy and requiring MaxA back to supine.  Pt currently limited by weakness, decreased activity tolerance,and pain which negatively impacts functional mobility. Pt will continue to benefit from skilled PT to address deficits.       Plan    Treatment Plan Status: Continue Current Treatment Plan  Type of Treatment: Bed Mobility, Gait Training, Neuro Re-Education / Balance, Self Care / Home Evaluation, Stair Training, Therapeutic Activities, Therapeutic Exercise  Treatment Frequency: 4 Times per Week  Treatment Duration: Until Therapy Goals Met    DC Equipment Recommendations: Unable to determine at this time  Discharge Recommendations: Recommend post-acute placement for additional physical therapy services prior to discharge home       06/01/23 1440   Cognition    Comments pleasant, cooperative   Sitting Lower Body Exercises   Sitting Lower Body Exercises Yes   Ankle Pumps 1 set of 10;Bilateral   Long Arc Quad 1 set of 10;Bilateral   Comments quick to fatigue, needed rest breaks   Balance   Sitting Balance (Static) Fair   Sitting Balance (Dynamic) Fair   Weight Shift Sitting Fair   Skilled Intervention Verbal Cuing   Bed Mobility    Supine to Sit Supervised   Sit to Supine Maximal Assist   Scooting Minimal Assist   Skilled Intervention Verbal Cuing   Comments HOB elevated   Gait Analysis   Gait Level Of Assist Unable to Participate   Functional Mobility   Sit to Stand Unable to Participate  (due to pain)   Comments pt agreeable to attempt stand,  however after sitting EOB with feet in contact with ground, pt declined due to pain   Short Term Goals    Short Term Goal # 1 pt will perform supine <> sit without bed features with SPV in 6 visits to get in/out of bed at home   Goal Outcome # 1 Progressing as expected   Short Term Goal # 2 pt will perform all functional xfrs with SPV in 6 visits for improved independence   Goal Outcome # 2 Progressing slower than expected   Short Term Goal # 3 pt will ambulate 50ft with FWW and SPV in 6 visits for improved independence   Goal Outcome # 3 Progressing slower than expected   Short Term Goal # 4 pt will negotiate 1 step with SPV in 6 visits to access home   Goal Outcome # 4 Progressing slower than expected

## 2023-06-01 NOTE — CARE PLAN
The patient is Stable - Low risk of patient condition declining or worsening    Shift Goals  Clinical Goals: Pain control  Patient Goals: pain control  Family Goals: n/a    Progress made toward(s) clinical / shift goals:    Problem: Pain - Standard  Goal: Alleviation of pain or a reduction in pain to the patient’s comfort goal  Outcome: Progressing  Note: Patient given PRN medication for 8-10 pain with little relief in between      Problem: Fall Risk  Goal: Patient will remain free from falls  Outcome: Progressing  Note: No falls this shift, bed in lowest position and locked, non slip socks on and bed alarm on.        Patient is not progressing towards the following goals:

## 2023-06-01 NOTE — PROGRESS NOTES
Hospital Medicine Daily Progress Note    Date of Service  6/1/2023    Chief Complaint  Weakness, AMS, difficulty eating    Hospital Course  Mary Martinez is a 52 y.o. female with bipolar disorder, asthma, rheumatoid arthritis on Enbrel, chronic pain on oxycodone, history of expiratory laparotomy after duodenal perforation (2019), prior cholecystectomy, chronic abdominal pain due to SMA syndrome.  She was noted to have starvation ketosis, severe malnutrition, with oral thrush, along with HASMUKH with UTI.  She was treated with antibiotics, nystatin, along with IV fluids.  Her high anion gap metabolic acidosis and HASMUKH have resolved.  She has completed treatment for UTI.  NGT was placed and tube feeding was started.  Surgery was consulted for G-tube placement, who gave the opinion that this may be difficult due to her prior abdominal surgeries and recommended to discuss with GI for PEG placement once tube feeding is at goal (at 45cc/hr) for at least one week and no further signs of refeeding syndrome.  She was evaluated by PT/OT who recommended SNF placement. SLP recommended level 7 (easy to chew) diet with thin liquids.    Interval Problem Update  6/1/2023 - I reviewed the patient's chart. There were no significant overnight events. Remains hemodynamically stable and afebrile. Stable on RA.  WBC 11,300.  Hemoglobin stable at 8.8.  Potassium 3.4.  Creatinine improved to 1.35.  Sodium is normal.  Magnesium normal at 2.0.  Phosphorus 3.7.    > I have personally seen and examined the patient today.  She is tolerating her tube feeding.  Denies any nausea, vomiting, abdominal pain or diarrhea.  She is also able to tolerate oral diet.  Her only complaint is throat pain.  No chest pain or shortness of breath.      I personally reviewed all lab results mentioned above. Prior medical records from this institution and outside facilities were independently reviewed as noted. I also personally reviewed all and consultant  recommendations and plans as documented above. History was independently obtained by myself. I have discussed this patient's plan of care and discharge plan at IDT rounds today with Case Management, Nursing, Nursing leadership, and other members of the IDT team.    Consultants/Specialty  general surgery and GI    Code Status  DNAR/DNI    Disposition  The patient is not medically cleared for discharge to home or a post-acute facility.    Anticipate discharge to SNF.  SNF referrals pending.  I have placed the appropriate orders for post-discharge needs.    Review of Systems  ROS     Pertinent positives/negatives as mentioned above.     A complete review of systems was personally done by me. All other systems were negative.       Physical Exam  Temp:  [36.4 °C (97.6 °F)-37.4 °C (99.3 °F)] 36.6 °C (97.9 °F)  Pulse:  [66-77] 66  Resp:  [18] 18  BP: ()/(54-68) 102/61  SpO2:  [95 %-98 %] 95 %    Physical Exam  Vitals reviewed.   Constitutional:       General: She is not in acute distress.     Appearance: Normal appearance. She is not ill-appearing or diaphoretic.      Comments: Cachectic. Body mass index is 16.41 kg/m².   HENT:      Head: Normocephalic and atraumatic.      Right Ear: External ear normal.      Left Ear: External ear normal.      Nose:      Comments: NGT in place     Mouth/Throat:      Mouth: Mucous membranes are moist.      Pharynx: No oropharyngeal exudate or posterior oropharyngeal erythema.   Eyes:      General: No scleral icterus.     Extraocular Movements: Extraocular movements intact.      Conjunctiva/sclera: Conjunctivae normal.      Pupils: Pupils are equal, round, and reactive to light.   Cardiovascular:      Rate and Rhythm: Normal rate and regular rhythm.      Heart sounds: Normal heart sounds. No murmur heard.  Pulmonary:      Effort: Pulmonary effort is normal. No respiratory distress.      Breath sounds: Normal breath sounds. No stridor. No wheezing, rhonchi or rales.   Chest:      Chest  wall: No tenderness.   Abdominal:      General: Bowel sounds are normal. There is no distension.      Palpations: Abdomen is soft. There is no mass.      Tenderness: There is no abdominal tenderness. There is no guarding or rebound.   Musculoskeletal:         General: No swelling. Normal range of motion.      Cervical back: Normal range of motion and neck supple. No rigidity. No muscular tenderness.      Right lower leg: No edema.      Left lower leg: No edema.   Lymphadenopathy:      Cervical: No cervical adenopathy.   Skin:     General: Skin is warm and dry.      Coloration: Skin is not jaundiced.      Findings: No rash.   Neurological:      General: No focal deficit present.      Mental Status: She is alert and oriented to person, place, and time. Mental status is at baseline.      Cranial Nerves: No cranial nerve deficit.   Psychiatric:         Mood and Affect: Mood normal.         Behavior: Behavior normal.         Thought Content: Thought content normal.         Judgment: Judgment normal.       I have performed the physical examination today 6/1/2023.  In review of yesterday's note, there are no new changes except as documented above.      Fluids    Intake/Output Summary (Last 24 hours) at 6/1/2023 1200  Last data filed at 6/1/2023 1134  Gross per 24 hour   Intake 435 ml   Output --   Net 435 ml       Laboratory  Recent Labs     06/01/23  0305   WBC 11.3*   RBC 3.05*   HEMOGLOBIN 8.8*   HEMATOCRIT 27.5*   MCV 90.2   MCH 28.9   MCHC 32.0*   RDW 69.0*   PLATELETCT 319   MPV 10.3     Recent Labs     05/31/23  1223 05/31/23  2052 06/01/23  0940   SODIUM 136 137 138   POTASSIUM 3.4* 3.4* 3.8   CHLORIDE 103 105 104   CO2 20 24 22   GLUCOSE 120* 121* 123*   BUN 11 11 11   CREATININE 1.44* 1.35 1.25   CALCIUM 7.7* 7.8* 7.8*                   Imaging  IR-MIDLINE CATHETER INSERTION WO GUIDANCE > AGE 3   Final Result                  Ultrasound-guided midline placement performed by qualified nursing staff    as above.           DX-ABDOMEN FOR TUBE PLACEMENT   Final Result      Nasogastric tube is now satisfactorily with the stomach.      DX-ABDOMEN FOR TUBE PLACEMENT   Final Result      Nasogastric tube side-port is in the distal esophagus. Recommend advancement.           Assessment/Plan  * High anion gap metabolic acidosis- (present on admission)  Assessment & Plan  -Due to starvation ketosis  -Lactic acid negative  -Resolved  -Continue to follow. BMP in AM.        Failure to thrive in adult- (present on admission)  Assessment & Plan  -with severe malnutrition and muscle wasting, cachexia  -Continued tube feeding per NGT.  Currently at 30 cc/h.  Slowly increase to goal rate of 45 cc/h.  Monitor for refeeding syndrome.  Also tolerating oral diet per SLP recommendations -  level 7 (easy to chew) diet with thin liquids.  -Per surgery G-tube placement may be difficult due to her prior abdominal surgeries and recommended to discuss with GI for PEG placement once tube feeding is at goal (at 45cc/hr) for at least one week and no further signs of refeeding syndrome.  -Monitor electrolytes and renal function closely watch out for refeeding syndrome.  CMP, magnesium and phosphate in the morning.    Starvation ketoacidosis- (present on admission)  Assessment & Plan  -resolved.     Severe protein-calorie malnutrition (HCC)- (present on admission)  Assessment & Plan  -She remains very high risk for refeeding syndrome, will continue to follow very closely with serial electrolytes.  -Continue tube feeds as above.     UTI (urinary tract infection)- (present on admission)  Assessment & Plan  - Completed course of antibiotics.    Thrush- (present on admission)  Assessment & Plan  -HIV negative.  No further visible thrush.   -Continue nystatin swish and swallow.    Hypocalcemia- (present on admission)  Assessment & Plan  -Improving.    -continue replacement with tums  -Monitor level daily    Unintentional weight loss- (present on  admission)  Assessment & Plan  -Related to SMA and inability to keep food down.  -HIV and lipase negative.  -Malnutrition contributing  -Management as above.    Hypophosphatemia- (present on admission)  Assessment & Plan  -Replaced.  -Resolved but high risk for fluctuation and remains high risk for refeeding syndrome  -Continue to closely follow level daily      HASMUKH (acute kidney injury) (Former[facility])- (present on admission)  Assessment & Plan  - Creatinine 1.35 today.  -Continue hydration with D5 half NS at 83 cc/h.  -Monitor for improvement in renal function.  BMP in the morning.  - avoid nephrotoxins, and continue to renally dose all medications.    Hypokalemia- (present on admission)  Assessment & Plan  - Potassium low this morning (3.4).   -Continue IV KCl 40mEq with IV fluids, monitor level daily.  Magnesium has normalized, continue to trend and replace as needed.    -Monitor BMP.    Hyponatremia- (present on admission)  Assessment & Plan  -Likely due to severe dehydration.  Now resolved.  -Continue IV fluids.  Follow sodium levels.    Anemia- (present on admission)  Assessment & Plan  -Acute on chronic  -Currently hemodilution and frequent blood draws contributing - trying to limit  -Continue to monitor, Hgb has been stable.    Bipolar 1 disorder ([facility])- (present on admission)  Assessment & Plan  -Continue Paxil and Seroquel    SMAS (superior mesenteric artery syndrome) c/b feeding issues ([facility])- (present on admission)  Assessment & Plan  -Contributing to severe malnutrition, chronic abdominal pain.    Chronic pain- (present on admission)  Assessment & Plan  -Patient on home oxycodone but narcotics may be contributing to her chronic emesis.   - Continue Paxil.  Continue oral oxycodone and IV Dilaudid for pain.    Rheumatoid arthritis ([facility])- (present on admission)  Assessment & Plan  -With chronic deformity and s/p amputations  -No evidence of acute flair  -Continue chronic analgesics.    Tobacco dependence- (present on  admission)  Assessment & Plan  - Counseled on smoking cessation.    Pancreatitis- (present on admission)  Assessment & Plan  -H/o. Lipase negative.         VTE prophylaxis: enoxaparin ppx

## 2023-06-01 NOTE — THERAPY
Occupational Therapy Contact Note    Patient Name: Mary Martinez  Age:  53 y.o., Sex:  female  Medical Record #: [id number]  Today's Date: 6/1/2023    Patient currently working with PT upon arrival, will re-attempt in PM as able/appropriate.      Arie Shahid OTD, OTR/L

## 2023-06-01 NOTE — PROGRESS NOTES
Bedside report from night RN, pt care assumed. VSS on RA, pt assessment complete. Pt A&Ox4,  c/o 8-9/10 pain this shift. POC discussed with pt and verbalizes no questions. Pt denies any additional needs at this time. Bed locked and in lowest position, bed alarm on. Pt educated on fall risk and verbalized understanding, call light within reach, hourly rounding initiated.     Patient on NG tube feedings at 25 with a goal of 45. Advancing by 5 every 24 hours. Patient complaining of Arthritis and throat pain this shift.

## 2023-06-02 ENCOUNTER — APPOINTMENT (OUTPATIENT)
Dept: RADIOLOGY | Facility: MEDICAL CENTER | Age: 53
DRG: 682 | End: 2023-06-02
Attending: INTERNAL MEDICINE
Payer: MEDICAID

## 2023-06-02 LAB
ANION GAP SERPL CALC-SCNC: 12 MMOL/L (ref 7–16)
ANION GAP SERPL CALC-SCNC: 9 MMOL/L (ref 7–16)
BUN SERPL-MCNC: 10 MG/DL (ref 8–22)
BUN SERPL-MCNC: 9 MG/DL (ref 8–22)
CALCIUM SERPL-MCNC: 8.3 MG/DL (ref 8.5–10.5)
CALCIUM SERPL-MCNC: 8.4 MG/DL (ref 8.5–10.5)
CHLORIDE SERPL-SCNC: 103 MMOL/L (ref 96–112)
CHLORIDE SERPL-SCNC: 106 MMOL/L (ref 96–112)
CO2 SERPL-SCNC: 20 MMOL/L (ref 20–33)
CO2 SERPL-SCNC: 20 MMOL/L (ref 20–33)
CREAT SERPL-MCNC: 1.26 MG/DL (ref 0.5–1.4)
CREAT SERPL-MCNC: 1.32 MG/DL (ref 0.5–1.4)
GFR SERPLBLD CREATININE-BSD FMLA CKD-EPI: 49 ML/MIN/1.73 M 2
GFR SERPLBLD CREATININE-BSD FMLA CKD-EPI: 52 ML/MIN/1.73 M 2
GLUCOSE SERPL-MCNC: 114 MG/DL (ref 65–99)
GLUCOSE SERPL-MCNC: 90 MG/DL (ref 65–99)
MAGNESIUM SERPL-MCNC: 1.8 MG/DL (ref 1.5–2.5)
PHOSPHATE SERPL-MCNC: 3.6 MG/DL (ref 2.5–4.5)
POTASSIUM SERPL-SCNC: 4.5 MMOL/L (ref 3.6–5.5)
POTASSIUM SERPL-SCNC: 4.5 MMOL/L (ref 3.6–5.5)
SODIUM SERPL-SCNC: 135 MMOL/L (ref 135–145)
SODIUM SERPL-SCNC: 135 MMOL/L (ref 135–145)

## 2023-06-02 PROCEDURE — 700102 HCHG RX REV CODE 250 W/ 637 OVERRIDE(OP): Performed by: INTERNAL MEDICINE

## 2023-06-02 PROCEDURE — 770001 HCHG ROOM/CARE - MED/SURG/GYN PRIV*

## 2023-06-02 PROCEDURE — 700111 HCHG RX REV CODE 636 W/ 250 OVERRIDE (IP): Performed by: INTERNAL MEDICINE

## 2023-06-02 PROCEDURE — A9270 NON-COVERED ITEM OR SERVICE: HCPCS | Performed by: HOSPITALIST

## 2023-06-02 PROCEDURE — 700101 HCHG RX REV CODE 250: Performed by: INTERNAL MEDICINE

## 2023-06-02 PROCEDURE — 92526 ORAL FUNCTION THERAPY: CPT

## 2023-06-02 PROCEDURE — 700102 HCHG RX REV CODE 250 W/ 637 OVERRIDE(OP): Performed by: HOSPITALIST

## 2023-06-02 PROCEDURE — A9270 NON-COVERED ITEM OR SERVICE: HCPCS | Performed by: INTERNAL MEDICINE

## 2023-06-02 PROCEDURE — 99232 SBSQ HOSP IP/OBS MODERATE 35: CPT | Performed by: INTERNAL MEDICINE

## 2023-06-02 PROCEDURE — 700111 HCHG RX REV CODE 636 W/ 250 OVERRIDE (IP): Performed by: HOSPITALIST

## 2023-06-02 PROCEDURE — 97535 SELF CARE MNGMENT TRAINING: CPT | Mod: CO

## 2023-06-02 PROCEDURE — 83735 ASSAY OF MAGNESIUM: CPT

## 2023-06-02 PROCEDURE — 84100 ASSAY OF PHOSPHORUS: CPT

## 2023-06-02 PROCEDURE — 700105 HCHG RX REV CODE 258: Performed by: HOSPITALIST

## 2023-06-02 PROCEDURE — 36415 COLL VENOUS BLD VENIPUNCTURE: CPT

## 2023-06-02 PROCEDURE — 80048 BASIC METABOLIC PNL TOTAL CA: CPT

## 2023-06-02 RX ORDER — LIDOCAINE HYDROCHLORIDE 20 MG/ML
15 SOLUTION OROPHARYNGEAL
Status: DISCONTINUED | OUTPATIENT
Start: 2023-06-02 | End: 2023-06-13 | Stop reason: HOSPADM

## 2023-06-02 RX ADMIN — POTASSIUM CHLORIDE: 149 INJECTION, SOLUTION, CONCENTRATE INTRAVENOUS at 22:08

## 2023-06-02 RX ADMIN — OXYCODONE HYDROCHLORIDE 10 MG: 10 TABLET ORAL at 06:31

## 2023-06-02 RX ADMIN — ENOXAPARIN SODIUM 40 MG: 100 INJECTION SUBCUTANEOUS at 18:01

## 2023-06-02 RX ADMIN — OXYCODONE HYDROCHLORIDE 10 MG: 10 TABLET ORAL at 02:37

## 2023-06-02 RX ADMIN — ANTACID TABLETS 500 MG: 500 TABLET, CHEWABLE ORAL at 12:58

## 2023-06-02 RX ADMIN — OXYCODONE HYDROCHLORIDE 10 MG: 10 TABLET ORAL at 22:20

## 2023-06-02 RX ADMIN — PAROXETINE HYDROCHLORIDE 60 MG: 20 TABLET, FILM COATED ORAL at 18:01

## 2023-06-02 RX ADMIN — HYDROMORPHONE HYDROCHLORIDE 0.25 MG: 1 INJECTION, SOLUTION INTRAMUSCULAR; INTRAVENOUS; SUBCUTANEOUS at 18:03

## 2023-06-02 RX ADMIN — PROCHLORPERAZINE EDISYLATE 10 MG: 5 INJECTION INTRAMUSCULAR; INTRAVENOUS at 22:12

## 2023-06-02 RX ADMIN — ONDANSETRON 4 MG: 2 INJECTION INTRAMUSCULAR; INTRAVENOUS at 18:01

## 2023-06-02 RX ADMIN — Medication 1 SPRAY: at 22:24

## 2023-06-02 RX ADMIN — QUETIAPINE FUMARATE 100 MG: 100 TABLET ORAL at 18:02

## 2023-06-02 RX ADMIN — LIDOCAINE HYDROCHLORIDE 15 ML: 20 SOLUTION ORAL; TOPICAL at 16:12

## 2023-06-02 RX ADMIN — OXYCODONE HYDROCHLORIDE 10 MG: 10 TABLET ORAL at 11:21

## 2023-06-02 RX ADMIN — Medication 100 MG: at 05:14

## 2023-06-02 RX ADMIN — HYDROMORPHONE HYDROCHLORIDE 0.25 MG: 1 INJECTION, SOLUTION INTRAMUSCULAR; INTRAVENOUS; SUBCUTANEOUS at 07:32

## 2023-06-02 RX ADMIN — OXYCODONE HYDROCHLORIDE 10 MG: 10 TABLET ORAL at 16:43

## 2023-06-02 RX ADMIN — ANTACID TABLETS 500 MG: 500 TABLET, CHEWABLE ORAL at 18:02

## 2023-06-02 RX ADMIN — LIDOCAINE HYDROCHLORIDE 15 ML: 20 SOLUTION ORAL; TOPICAL at 10:31

## 2023-06-02 RX ADMIN — KETOROLAC TROMETHAMINE 15 MG: 30 INJECTION, SOLUTION INTRAMUSCULAR; INTRAVENOUS at 13:03

## 2023-06-02 RX ADMIN — KETOROLAC TROMETHAMINE 15 MG: 30 INJECTION, SOLUTION INTRAMUSCULAR; INTRAVENOUS at 02:38

## 2023-06-02 RX ADMIN — POTASSIUM CHLORIDE: 149 INJECTION, SOLUTION, CONCENTRATE INTRAVENOUS at 06:35

## 2023-06-02 ASSESSMENT — PAIN DESCRIPTION - PAIN TYPE
TYPE: ACUTE PAIN;CHRONIC PAIN

## 2023-06-02 NOTE — CARE PLAN
The patient is Stable - Low risk of patient condition declining or worsening    Shift Goals  Clinical Goals: NG tube feeding  Patient Goals: pain management  Family Goals: n/a    Progress made toward(s) clinical / shift goals:    Problem: Pain - Standard  Goal: Alleviation of pain or a reduction in pain to the patient’s comfort goal  Outcome: Progressing  Note: New PRN pain medication order in place      Problem: Knowledge Deficit - Standard  Goal: Patient and family/care givers will demonstrate understanding of plan of care, disease process/condition, diagnostic tests and medications  Outcome: Progressing  Note: Pt Alert and oriented. Understands the need of the hospital stay.         Patient is not progressing towards the following goals:

## 2023-06-02 NOTE — PROGRESS NOTES
Hospital Medicine Daily Progress Note    Date of Service  6/2/2023    Chief Complaint  Weakness, AMS, difficulty eating    Hospital Course  Mary Martinez is a 52 y.o. female with bipolar disorder, asthma, rheumatoid arthritis on Enbrel, chronic pain on oxycodone, history of expiratory laparotomy after duodenal perforation (2019), prior cholecystectomy, chronic abdominal pain due to SMA syndrome.  She was noted to have starvation ketosis, severe malnutrition, with oral thrush, along with HASMUKH with UTI.  She was treated with antibiotics, nystatin, along with IV fluids.  Her high anion gap metabolic acidosis and HASMUKH have resolved.  She has completed treatment for UTI.  NGT was placed and tube feeding was started.  Surgery was consulted for G-tube placement, who gave the opinion that this may be difficult due to her prior abdominal surgeries and recommended to discuss with GI for PEG placement once tube feeding is at goal (at 45cc/hr) for at least one week and no further signs of refeeding syndrome.  She was evaluated by PT/OT who recommended SNF placement. SLP recommended level 7 (easy to chew) diet with thin liquids.    Interval Problem Update  6/2/2023 - I reviewed the patient's chart today. Uneventful night. VSS. Afebrile. Saturating well on RA.  Electrolytes and renal function are normal.  Magnesium 1.8.  Phosphorus 3.5.  Tube feeding rate is now at 35 cc/h.    > I have personally seen and examined the patient today. (+) Throat pain.  Has some loose stools, but no watery diarrhea.  No nausea or vomiting.  No abdominal pain.  No chest pain or shortness of breath.      I personally reviewed all lab results mentioned above. Prior medical records from this institution and outside facilities were independently reviewed as noted. I also personally reviewed all and consultant recommendations and plans as documented above. History was independently obtained by myself. I have discussed this patient's plan of care and  discharge plan at IDT rounds today with Case Management, Nursing, Nursing leadership, and other members of the IDT team.    Consultants/Specialty  general surgery and GI    Code Status  DNAR/DNI    Disposition  The patient is not medically cleared for discharge to home or a post-acute facility.      Anticipate discharge to SNF.  SNF referrals pending.  I have placed the appropriate orders for post-discharge needs.    Review of Systems  ROS     Pertinent positives/negatives as mentioned above.     A complete review of systems was personally done by me. All other systems were negative.       Physical Exam  Temp:  [36.6 °C (97.9 °F)-36.9 °C (98.4 °F)] 36.7 °C (98 °F)  Pulse:  [69-87] 78  Resp:  [15-20] 15  BP: ()/(53-76) 97/65  SpO2:  [95 %-100 %] 96 %    Physical Exam  Vitals reviewed.   Constitutional:       General: She is not in acute distress.     Appearance: Normal appearance. She is not ill-appearing or diaphoretic.      Comments: Cachectic. Body mass index is 16.41 kg/m².   HENT:      Head: Normocephalic and atraumatic.      Right Ear: External ear normal.      Left Ear: External ear normal.      Nose:      Comments: NGT in place     Mouth/Throat:      Mouth: Mucous membranes are moist.      Pharynx: No oropharyngeal exudate or posterior oropharyngeal erythema.   Eyes:      General: No scleral icterus.     Extraocular Movements: Extraocular movements intact.      Conjunctiva/sclera: Conjunctivae normal.      Pupils: Pupils are equal, round, and reactive to light.   Cardiovascular:      Rate and Rhythm: Normal rate and regular rhythm.      Heart sounds: Normal heart sounds. No murmur heard.  Pulmonary:      Effort: Pulmonary effort is normal. No respiratory distress.      Breath sounds: Normal breath sounds. No stridor. No wheezing, rhonchi or rales.   Chest:      Chest wall: No tenderness.   Abdominal:      General: Bowel sounds are normal. There is no distension.      Palpations: Abdomen is soft. There  is no mass.      Tenderness: There is no abdominal tenderness. There is no guarding or rebound.   Musculoskeletal:         General: No swelling. Normal range of motion.      Cervical back: Normal range of motion and neck supple. No rigidity. No muscular tenderness.      Right lower leg: No edema.      Left lower leg: No edema.   Lymphadenopathy:      Cervical: No cervical adenopathy.   Skin:     General: Skin is warm and dry.      Coloration: Skin is not jaundiced.      Findings: No rash.   Neurological:      General: No focal deficit present.      Mental Status: She is alert and oriented to person, place, and time. Mental status is at baseline.      Cranial Nerves: No cranial nerve deficit.   Psychiatric:         Mood and Affect: Mood normal.         Behavior: Behavior normal.         Thought Content: Thought content normal.         Judgment: Judgment normal.       I have performed the physical examination today 6/2/2023.  In review of yesterday's note, there are no new changes except as documented above.      Fluids    Intake/Output Summary (Last 24 hours) at 6/2/2023 1152  Last data filed at 6/1/2023 2100  Gross per 24 hour   Intake 490 ml   Output --   Net 490 ml       Laboratory  Recent Labs     06/01/23  0305   WBC 11.3*   RBC 3.05*   HEMOGLOBIN 8.8*   HEMATOCRIT 27.5*   MCV 90.2   MCH 28.9   MCHC 32.0*   RDW 69.0*   PLATELETCT 319   MPV 10.3     Recent Labs     06/01/23  0940 06/01/23  2057 06/02/23  0909   SODIUM 138 138 135   POTASSIUM 3.8 4.1 4.5   CHLORIDE 104 107 103   CO2 22 21 20   GLUCOSE 123* 93 114*   BUN 11 9 10   CREATININE 1.25 1.26 1.26   CALCIUM 7.8* 8.1* 8.3*                   Imaging  IR-MIDLINE CATHETER INSERTION WO GUIDANCE > AGE 3   Final Result                  Ultrasound-guided midline placement performed by qualified nursing staff    as above.          DX-ABDOMEN FOR TUBE PLACEMENT   Final Result      Nasogastric tube is now satisfactorily with the stomach.      DX-ABDOMEN FOR TUBE  PLACEMENT   Final Result      Nasogastric tube side-port is in the distal esophagus. Recommend advancement.           Assessment/Plan  * High anion gap metabolic acidosis- (present on admission)  Assessment & Plan  -Due to starvation ketosis  -Lactic acid negative  -Resolved  -Continue to follow. BMP in AM.        Failure to thrive in adult- (present on admission)  Assessment & Plan  -with severe malnutrition and muscle wasting, cachexia  -Continued tube feeding per NGT.  Currently at 35 cc/h.  Slowly increase to goal rate of 45 cc/h.  Monitor for refeeding syndrome.  Also tolerating oral diet per SLP recommendations -  level 7 (easy to chew) diet with thin liquids.  -Complaining of significant throat pain, likely from big bore and rigid NGT exerting pressure.  I discussed extensively with nursing, will try to see if tube can be replaced with cortrak.  Start viscous lidocaine as needed.  -Per surgery G-tube placement may be difficult due to her prior abdominal surgeries and recommended to discuss with GI for PEG placement once tube feeding is at goal (at 45cc/hr) for at least one week and no further signs of refeeding syndrome.  -Monitor electrolytes and renal function closely watch out for refeeding syndrome.  CMP, magnesium and phosphate in the morning.    Starvation ketoacidosis- (present on admission)  Assessment & Plan  -resolved.     Severe protein-calorie malnutrition (HCC)- (present on admission)  Assessment & Plan  -She remains very high risk for refeeding syndrome, will continue to follow very closely with serial electrolytes.  -Continue tube feeds as above.     UTI (urinary tract infection)- (present on admission)  Assessment & Plan  - Completed course of antibiotics.    Thrush- (present on admission)  Assessment & Plan  -HIV negative.  No further visible thrush.   -Continue nystatin swish and swallow.    Hypocalcemia- (present on admission)  Assessment & Plan  -Improving.    -continue replacement with  tums  -Monitor level daily    Unintentional weight loss- (present on admission)  Assessment & Plan  -Related to SMA and inability to keep food down.  -HIV and lipase negative.  -Malnutrition contributing  -Management as above.    Hypophosphatemia- (present on admission)  Assessment & Plan  -Replaced.  -Resolved but high risk for fluctuation and remains high risk for refeeding syndrome  -Continue to closely follow level daily      HASMUKH (acute kidney injury) (Formerly [facility])- (present on admission)  Assessment & Plan  - Creatinine has normalized.   -Continue hydration with D5 half NS at 83 cc/h.  -Monitor for improvement in renal function.  BMP in the morning.  - avoid nephrotoxins, and continue to renally dose all medications.    Hypokalemia- (present on admission)  Assessment & Plan  - Potassium WNL this AM.  -Continue IV KCl 40mEq with IV fluids, monitor level daily.  Magnesium has normalized, continue to trend and replace as needed.    -Monitor BMP.    Hyponatremia- (present on admission)  Assessment & Plan  -Likely due to severe dehydration.  Now resolved.  -Continue IV fluids.  Follow sodium levels.    Anemia- (present on admission)  Assessment & Plan  -Acute on chronic  -Currently hemodilution and frequent blood draws contributing - trying to limit  -Continue to monitor, Hgb has been stable.    Bipolar 1 disorder ([facility])- (present on admission)  Assessment & Plan  -Continue Paxil and Seroquel    SMAS (superior mesenteric artery syndrome) c/b feeding issues ([facility])- (present on admission)  Assessment & Plan  -Contributing to severe malnutrition, chronic abdominal pain.    Chronic pain- (present on admission)  Assessment & Plan  -Patient on home oxycodone but narcotics may be contributing to her chronic emesis.   - Continue Paxil.  Continue oral oxycodone and IV Dilaudid for pain.    Rheumatoid arthritis ([facility])- (present on admission)  Assessment & Plan  -With chronic deformity and s/p amputations  -No evidence of acute  flair  -Continue chronic analgesics.    Tobacco dependence- (present on admission)  Assessment & Plan  - Counseled on smoking cessation.    Pancreatitis- (present on admission)  Assessment & Plan  -H/o. Lipase negative.         VTE prophylaxis: enoxaparin ppx

## 2023-06-02 NOTE — CARE PLAN
The patient is Stable - Low risk of patient condition declining or worsening    Shift Goals  Clinical Goals: pain management  Patient Goals: pain management  Family Goals: n/a    Progress made toward(s) clinical / shift goals:  pt has an NG tube in place, reported throat pain. Medicated with oxycodone and IV toradol. PRN throat spray available.      Problem: Pain - Standard  Goal: Alleviation of pain or a reduction in pain to the patient’s comfort goal  Outcome: Progressing     Problem: Knowledge Deficit - Standard  Goal: Patient and family/care givers will demonstrate understanding of plan of care, disease process/condition, diagnostic tests and medications  Outcome: Progressing     Problem: Fall Risk  Goal: Patient will remain free from falls  Outcome: Progressing     Problem: Skin Integrity  Goal: Skin integrity is maintained or improved  Outcome: Progressing

## 2023-06-02 NOTE — PROCEDURES
Iris Feeding tube placement    Tube Team verified patient name and medical record number prior to tube placement.  Iris feeding tube (43 inches, 10 Green[place]ic) placed at 55 cm in right nare.  Per Iris picture, tube appears to be in the small bowel.    Nursing Instructions: Await KUB to confirm placement before use for medications or feeding. Stylet; may be removed, please place in labeled bag with insufflation bulb and save in patient medication drawer. Iris feeding tube is MRI conditional.

## 2023-06-02 NOTE — THERAPY
Speech Language Pathology   Daily Treatment     Patient Name: Mary Martinez  AGE:  53 y.o., SEX:  female  Medical Record #: [id number]  Date of Service: 6/2/2023    Precautions: Fall Risk, Swallow Precautions     Subjective  Patient seen on this date for dysphagia treatment. Patient awake, oriented, and c/o pain in right side of throat 7/10 at rest and 10/10 during swallow. Per RN, pt received oral pain meds + dilaudid and waiting on pharmacy for lidocaine.     Assessment  PO consisted of thin liquids and mashed potatoes that arrived on breakfast tray. Onset of swallow trigger was timely and no overt s/sx of aspiration. Pt declined textures from meal tray in fear of exacerbated odynophagia. Pt expressed ongoing pain and wishes to remove NG tube. Discussed with MD - big bore and rigid NGT likely causing odynophagia and MD gave OK to switch to IRIS if able/appropriate per nursing staff. RN updated. Pt agreeable to try lidocaine before making decision to pull tube.       Clinical Impressions  No clinical signs of oropharyngeal or esophageal dysphagia noted this session. Biggest barrier is odynophagia likely from big bore and rigid NGT. SLP following to ensure diet tolerance.       Recommendations  Diet Consistency: Easy to chew and Thin Liquid diet (per MD)  Instrumentation: Instrumental swallow study pending clinical progress  Medication: As tolerated, Whole with liquid, Crush with applesauce, as appropriate  Supervision: Assist with meal tray set up  Positioning: Fully upright and midline during oral intake  Risk Management : Small bites/sips, Alternate bites and sips, Slow rate of intake  Oral Care: Q6h        SLP Treatment Plan  Treatment Plan: Dysphagia Treatment, Patient/Family/Caregiver Training  SLP Frequency: 2x Per Week  Estimated Duration: Until Therapy Goals Met      Anticipated Discharge Needs  Discharge Recommendations: Recommend home health for continued speech therapy services  Therapy  "Recommendations Upon DC: Dysphagia Training, Patient / Family / Caregiver Education      Patient / Family Goals  Patient / Family Goal #1: \"I can swallow fine\"  Goal #1 Outcome: Progressing as expected  Short Term Goals  Short Term Goal # 1: Patient will consume a diet with thin liquids without overt indicators of aspiration or decline in pulmonary status.  Goal Outcome # 1: Progressing as expected      CAROLEE Rosario  "

## 2023-06-02 NOTE — PROGRESS NOTES
Pt alert/oriented x4, medicated for throat pain. Tolerating IVF. TF infusing @ 30mL/hr. Pt fatigued, resting in bed and using her phone. Call light within reach, personal belongings available, bed in lowest position, treaded socks on, and bed alarm on. Hourly rounding in place.

## 2023-06-02 NOTE — DIETARY
Nutrition support weekly update:  Day 8 of admit.  Mary Martinez is a 53 y.o. female with admitting DX of high anion gap with metabolic acidosis.  Tube feeding initiated on 5/26/23. Current TF via (route) NG receiving 35mL of Fibersource HN.    Assessment:  Weight: 42 kg via bed scale-likely related to bed scale variability and fluids  Re-estimate of nutritional needs not indicated at this time     Evaluation:   Per last SLP assessment, pt placed on level 7 (easy to chew) diet. Pt with minimal intake at 0-25%. Oral intake for pleasure and swallow maintenance at this time and TF to continue to work to provide 100% of nutrition needs.   Noted discussion revolving placement of G-tube once pt able to tolerate goal rate with consistency, RD supportive.   Pertinent Labs: Glucose 114, Potassium WNL since 6/1, Phosphorus WNL since 6/1, Magnesium WNL , NA WNL  Pertinent Meds: lovenox, omeprazole, seroquel, thimaine, refused senokot, continuous potassium chloride   Last BM: Type 7 watery on 6/2  Skin: no wounds, no presence of edema noted   Current feeding of Fibersource HN remains appropriate. Pt continues with 5mL/day increases to goal rate of 45mL/hr to provide 1296 kcal, 58 grams of protein, and 874 mL free water.   Pt currently receiving 35mL/hr of Fibersource which provides 1008 kcal and 45 grams of protein. This is currently meeting 77% of her kcal and protein goals. Will continue with stepwise advancement and pt anticipated to meet goal at 6/4.     Malnutrition risk: dx with severe chronic malnutrition; UPDATE: still meets criteria, though with improvements to increasing nutrition intake through admission via TF.     Recommendations/Plan:  Continue Fibersource HN with goal rate of 45mL/hr, anticipate to meet goal rate on 6/4.   Oral intake per SLP  Monitor weight    RD following

## 2023-06-02 NOTE — THERAPY
Occupational Therapy Contact Note    Patient Name: Mary Martinez  Age:  53 y.o., Sex:  female  Medical Record #: [id number]  Today's Date: 6/2/2023    Attempted to see pt for OT tx. During attempts to get to EOB pt reports being unable to continue d/t high pain levels in throat limiting her OOB activity. Educated pt able getting up w/ nrsg staff and using BSC as tolerated. Pt verbalized understanding and importance of increasing strength and independence. Will continue to follow while in house.

## 2023-06-03 LAB
ANION GAP SERPL CALC-SCNC: 10 MMOL/L (ref 7–16)
BUN SERPL-MCNC: 10 MG/DL (ref 8–22)
CALCIUM SERPL-MCNC: 7.9 MG/DL (ref 8.5–10.5)
CHLORIDE SERPL-SCNC: 106 MMOL/L (ref 96–112)
CO2 SERPL-SCNC: 19 MMOL/L (ref 20–33)
CREAT SERPL-MCNC: 1.25 MG/DL (ref 0.5–1.4)
GFR SERPLBLD CREATININE-BSD FMLA CKD-EPI: 52 ML/MIN/1.73 M 2
GLUCOSE SERPL-MCNC: 116 MG/DL (ref 65–99)
POTASSIUM SERPL-SCNC: 4.6 MMOL/L (ref 3.6–5.5)
SODIUM SERPL-SCNC: 135 MMOL/L (ref 135–145)

## 2023-06-03 PROCEDURE — 700111 HCHG RX REV CODE 636 W/ 250 OVERRIDE (IP): Performed by: HOSPITALIST

## 2023-06-03 PROCEDURE — 80048 BASIC METABOLIC PNL TOTAL CA: CPT

## 2023-06-03 PROCEDURE — 700111 HCHG RX REV CODE 636 W/ 250 OVERRIDE (IP): Mod: JW | Performed by: INTERNAL MEDICINE

## 2023-06-03 PROCEDURE — 770001 HCHG ROOM/CARE - MED/SURG/GYN PRIV*

## 2023-06-03 PROCEDURE — 36415 COLL VENOUS BLD VENIPUNCTURE: CPT

## 2023-06-03 PROCEDURE — 99232 SBSQ HOSP IP/OBS MODERATE 35: CPT | Performed by: INTERNAL MEDICINE

## 2023-06-03 PROCEDURE — 700102 HCHG RX REV CODE 250 W/ 637 OVERRIDE(OP): Performed by: HOSPITALIST

## 2023-06-03 PROCEDURE — 700102 HCHG RX REV CODE 250 W/ 637 OVERRIDE(OP): Performed by: INTERNAL MEDICINE

## 2023-06-03 PROCEDURE — A9270 NON-COVERED ITEM OR SERVICE: HCPCS | Performed by: INTERNAL MEDICINE

## 2023-06-03 PROCEDURE — A9270 NON-COVERED ITEM OR SERVICE: HCPCS | Performed by: HOSPITALIST

## 2023-06-03 PROCEDURE — 700111 HCHG RX REV CODE 636 W/ 250 OVERRIDE (IP): Performed by: INTERNAL MEDICINE

## 2023-06-03 RX ORDER — OMEPRAZOLE 20 MG/1
40 CAPSULE, DELAYED RELEASE ORAL DAILY
Status: DISCONTINUED | OUTPATIENT
Start: 2023-06-04 | End: 2023-06-03

## 2023-06-03 RX ORDER — OMEPRAZOLE 20 MG/1
40 CAPSULE, DELAYED RELEASE ORAL NIGHTLY
Status: DISCONTINUED | OUTPATIENT
Start: 2023-06-03 | End: 2023-06-08

## 2023-06-03 RX ADMIN — ONDANSETRON 4 MG: 4 TABLET, ORALLY DISINTEGRATING ORAL at 20:05

## 2023-06-03 RX ADMIN — OXYCODONE HYDROCHLORIDE 10 MG: 10 TABLET ORAL at 16:06

## 2023-06-03 RX ADMIN — QUETIAPINE FUMARATE 100 MG: 100 TABLET ORAL at 17:19

## 2023-06-03 RX ADMIN — HYDROMORPHONE HYDROCHLORIDE 0.25 MG: 1 INJECTION, SOLUTION INTRAMUSCULAR; INTRAVENOUS; SUBCUTANEOUS at 17:19

## 2023-06-03 RX ADMIN — KETOROLAC TROMETHAMINE 15 MG: 30 INJECTION, SOLUTION INTRAMUSCULAR; INTRAVENOUS at 23:17

## 2023-06-03 RX ADMIN — OXYCODONE HYDROCHLORIDE 10 MG: 10 TABLET ORAL at 07:56

## 2023-06-03 RX ADMIN — OXYCODONE HYDROCHLORIDE 10 MG: 10 TABLET ORAL at 12:16

## 2023-06-03 RX ADMIN — OMEPRAZOLE 40 MG: 20 CAPSULE, DELAYED RELEASE ORAL at 20:04

## 2023-06-03 RX ADMIN — ENOXAPARIN SODIUM 40 MG: 100 INJECTION SUBCUTANEOUS at 17:18

## 2023-06-03 RX ADMIN — PAROXETINE HYDROCHLORIDE 60 MG: 20 TABLET, FILM COATED ORAL at 17:19

## 2023-06-03 RX ADMIN — LIDOCAINE HYDROCHLORIDE 15 ML: 20 SOLUTION ORAL; TOPICAL at 07:58

## 2023-06-03 RX ADMIN — LIDOCAINE HYDROCHLORIDE 15 ML: 20 SOLUTION ORAL; TOPICAL at 16:09

## 2023-06-03 RX ADMIN — OXYCODONE HYDROCHLORIDE 10 MG: 10 TABLET ORAL at 20:06

## 2023-06-03 RX ADMIN — HYDROMORPHONE HYDROCHLORIDE 0.25 MG: 1 INJECTION, SOLUTION INTRAMUSCULAR; INTRAVENOUS; SUBCUTANEOUS at 09:09

## 2023-06-03 RX ADMIN — OXYCODONE HYDROCHLORIDE 10 MG: 10 TABLET ORAL at 03:33

## 2023-06-03 ASSESSMENT — PAIN DESCRIPTION - PAIN TYPE
TYPE: ACUTE PAIN
TYPE: CHRONIC PAIN

## 2023-06-03 NOTE — PROGRESS NOTES
Hospital Medicine Daily Progress Note    Date of Service  6/3/2023    Chief Complaint  Weakness, AMS, difficulty eating    Hospital Course  Mary Martinez is a 52 y.o. female with bipolar disorder, asthma, rheumatoid arthritis on Enbrel, chronic pain on oxycodone, history of expiratory laparotomy after duodenal perforation (2019), prior cholecystectomy, chronic abdominal pain due to SMA syndrome.  She was noted to have starvation ketosis, severe malnutrition, with oral thrush, along with HASMUKH with UTI.  She was treated with antibiotics, nystatin, along with IV fluids.  Her high anion gap metabolic acidosis and HASMUKH have resolved.  She has completed treatment for UTI.  NGT was placed and tube feeding was started.  Surgery was consulted for G-tube placement, who gave the opinion that this may be difficult due to her prior abdominal surgeries and recommended to discuss with GI for PEG placement once tube feeding is at goal (at 45cc/hr) for at least one week and no further signs of refeeding syndrome.  She was evaluated by PT/OT who recommended SNF placement. SLP recommended level 7 (easy to chew) diet with thin liquids.    Interval Problem Update  6/3/2023 - I reviewed the patient's chart. There were no significant overnight events. Remains hemodynamically stable and afebrile. Stable on RA.  Electrolytes and renal function are normal.  Potassium 4.6.  Her enteral tube was replaced to the iris feeding tube. She is now on 40cc/hr TF.     > I have personally seen and examined the patient today.  He is feeling better with the smaller tube.  Throat pain is much improved.  No nausea, vomiting, no abdominal pain.  No chest pain or shortness of breath.  She is hoping to be out of the hospital before the 14th, as she does not want to miss her daughter's high school graduation.        I personally reviewed all lab results mentioned above. Prior medical records from this institution and outside facilities were independently  reviewed as noted. I also personally reviewed all and consultant recommendations and plans as documented above. History was independently obtained by myself. I have discussed this patient's plan of care and discharge plan at IDT rounds today with Case Management, Nursing, Nursing leadership, and other members of the IDT team.    Consultants/Specialty  general surgery and GI    Code Status  DNAR/DNI    Disposition  The patient is not medically cleared for discharge to home or a post-acute facility.      Anticipate discharge to SNF.  SNF referrals pending.  I have placed the appropriate orders for post-discharge needs.    Review of Systems  ROS     Pertinent positives/negatives as mentioned above.     A complete review of systems was personally done by me. All other systems were negative.       Physical Exam  Temp:  [36.4 °C (97.5 °F)-37.6 °C (99.6 °F)] 36.9 °C (98.4 °F)  Pulse:  [75-76] 76  Resp:  [15-16] 15  BP: ()/(51-74) 93/57  SpO2:  [95 %-96 %] 95 %    Physical Exam  Vitals reviewed.   Constitutional:       General: She is not in acute distress.     Appearance: Normal appearance. She is not ill-appearing or diaphoretic.      Comments: Cachectic. Body mass index is 16.41 kg/m².   HENT:      Head: Normocephalic and atraumatic.      Right Ear: External ear normal.      Left Ear: External ear normal.      Nose:      Comments: iris feeding tube in place     Mouth/Throat:      Mouth: Mucous membranes are moist.      Pharynx: No oropharyngeal exudate or posterior oropharyngeal erythema.   Eyes:      General: No scleral icterus.     Extraocular Movements: Extraocular movements intact.      Conjunctiva/sclera: Conjunctivae normal.      Pupils: Pupils are equal, round, and reactive to light.   Cardiovascular:      Rate and Rhythm: Normal rate and regular rhythm.      Heart sounds: Normal heart sounds. No murmur heard.  Pulmonary:      Effort: Pulmonary effort is normal. No respiratory distress.      Breath sounds:  Normal breath sounds. No stridor. No wheezing, rhonchi or rales.   Chest:      Chest wall: No tenderness.   Abdominal:      General: Bowel sounds are normal. There is no distension.      Palpations: Abdomen is soft. There is no mass.      Tenderness: There is no abdominal tenderness. There is no guarding or rebound.   Musculoskeletal:         General: No swelling. Normal range of motion.      Cervical back: Normal range of motion and neck supple. No rigidity. No muscular tenderness.      Right lower leg: No edema.      Left lower leg: No edema.   Lymphadenopathy:      Cervical: No cervical adenopathy.   Skin:     General: Skin is warm and dry.      Coloration: Skin is not jaundiced.      Findings: No rash.   Neurological:      General: No focal deficit present.      Mental Status: She is alert and oriented to person, place, and time. Mental status is at baseline.      Cranial Nerves: No cranial nerve deficit.   Psychiatric:         Mood and Affect: Mood normal.         Behavior: Behavior normal.         Thought Content: Thought content normal.         Judgment: Judgment normal.       I have performed the physical examination today 6/3/2023.  In review of yesterday's note, there are no new changes except as documented above.      Fluids    Intake/Output Summary (Last 24 hours) at 6/3/2023 1105  Last data filed at 6/3/2023 0337  Gross per 24 hour   Intake 450 ml   Output --   Net 450 ml       Laboratory  Recent Labs     06/01/23  0305   WBC 11.3*   RBC 3.05*   HEMOGLOBIN 8.8*   HEMATOCRIT 27.5*   MCV 90.2   MCH 28.9   MCHC 32.0*   RDW 69.0*   PLATELETCT 319   MPV 10.3     Recent Labs     06/02/23  0909 06/02/23  2040 06/03/23  0911   SODIUM 135 135 135   POTASSIUM 4.5 4.5 4.6   CHLORIDE 103 106 106   CO2 20 20 19*   GLUCOSE 114* 90 116*   BUN 10 9 10   CREATININE 1.26 1.32 1.25   CALCIUM 8.3* 8.4* 7.9*                   Imaging  DX-ABDOMEN FOR TUBE PLACEMENT   Final Result      Enteric tube tip projects over the  stomach.      IR-MIDLINE CATHETER INSERTION WO GUIDANCE > AGE 3   Final Result                  Ultrasound-guided midline placement performed by qualified nursing staff    as above.          DX-ABDOMEN FOR TUBE PLACEMENT   Final Result      Nasogastric tube is now satisfactorily with the stomach.      DX-ABDOMEN FOR TUBE PLACEMENT   Final Result      Nasogastric tube side-port is in the distal esophagus. Recommend advancement.           Assessment/Plan  * High anion gap metabolic acidosis- (present on admission)  Assessment & Plan  -Due to starvation ketosis  -Lactic acid negative  -Resolved  -Continue to follow. BMP in AM.        Failure to thrive in adult- (present on admission)  Assessment & Plan  -with severe malnutrition and muscle wasting, cachexia  -Continued tube feeding per NGT.  Currently at 40 cc/h.  Slowly increase to goal rate of 45 cc/h.  Monitor for refeeding syndrome.  Also tolerating oral diet per SLP recommendations -  level 7 (easy to chew) diet with thin liquids.  -Throat pain better with exchange of feeding tube to Iris from big bore and rigid NGT.  - Per surgery G-tube placement may be difficult due to her prior abdominal surgeries and recommended to discuss with GI for PEG placement once tube feeding is at goal (at 45cc/hr) for at least one week and no further signs of refeeding syndrome.  If unable to place PEG tube, can consider surgical J tube placement.  I have reached out to gastroenterology to anticipate need for PEG tube placement sometime late next week.  -Monitor electrolytes and renal function closely watch out for refeeding syndrome.  CMP, magnesium and phosphate in the morning.    Starvation ketoacidosis- (present on admission)  Assessment & Plan  -resolved.     Severe protein-calorie malnutrition (HCC)- (present on admission)  Assessment & Plan  -She remains very high risk for refeeding syndrome, will continue to follow very closely with serial electrolytes.  -Continue tube feeds  as above.     UTI (urinary tract infection)- (present on admission)  Assessment & Plan  - Completed course of antibiotics.    Thrush- (present on admission)  Assessment & Plan  -HIV negative.  No further visible thrush.   -Continue nystatin swish and swallow.    Hypocalcemia- (present on admission)  Assessment & Plan  -Improving.    -continue replacement with tums  -Monitor level daily    Unintentional weight loss- (present on admission)  Assessment & Plan  -Related to SMA and inability to keep food down.  -HIV and lipase negative.  -Malnutrition contributing  -Management as above.    Hypophosphatemia- (present on admission)  Assessment & Plan  -Replaced.  -Resolved but high risk for fluctuation and remains high risk for refeeding syndrome  -Continue to closely follow level daily      HASMUKH (acute kidney injury) ([facility])- (present on admission)  Assessment & Plan  - Creatinine has normalized.   -Continue hydration with D5 half NS at 83 cc/h.  -Monitor for improvement in renal function.  BMP in the morning.  - avoid nephrotoxins, and continue to renally dose all medications.    Hypokalemia- (present on admission)  Assessment & Plan  - Potassium WNL this AM. Magnesium has normalized  -Will do trial of stopping IV fluids with IV KCl to ensure electrolytes remain within normal limits.  - Monitor level daily, continue to trend and replace as needed.  BMP in AM.    Hyponatremia- (present on admission)  Assessment & Plan  -Likely due to severe dehydration.  Now resolved.  -Trial of stopping IVF.  Follow sodium levels.    Anemia- (present on admission)  Assessment & Plan  -Acute on chronic  -Currently hemodilution and frequent blood draws contributing - trying to limit blood draws.  -Continue to monitor, Hgb has been stable.    Bipolar 1 disorder ([facility])- (present on admission)  Assessment & Plan  -Continue Paxil and Seroquel    SMAS (superior mesenteric artery syndrome) c/b feeding issues ([facility])- (present on admission)  Assessment  & Plan  -Contributing to severe malnutrition, chronic abdominal pain.    Chronic pain- (present on admission)  Assessment & Plan  -Patient on home oxycodone but narcotics may be contributing to her chronic emesis.   - Continue Paxil.  Continue oral oxycodone and IV Dilaudid for pain.    Rheumatoid arthritis (HCC)- (present on admission)  Assessment & Plan  -With chronic deformity and s/p amputations  -No evidence of acute flair  -Continue chronic analgesics.    Tobacco dependence- (present on admission)  Assessment & Plan  - Counseled on smoking cessation.    Pancreatitis- (present on admission)  Assessment & Plan  -H/o. Lipase negative.         VTE prophylaxis: enoxaparin ppx

## 2023-06-03 NOTE — CARE PLAN
The patient is Watcher - Medium risk of patient condition declining or worsening    Shift Goals  Clinical Goals: Tube feed, less pain, rest  Patient Goals: Rest  Family Goals: n/a    Progress made toward(s) clinical / shift goals:    Problem: Pain - Standard  Goal: Alleviation of pain or a reduction in pain to the patient’s comfort goal  6/3/2023 0440 by Noris Castillo, R.N.  Outcome: Not Progressing  Flowsheets (Taken 6/3/2023 0440)  Non Verbal Scale: Moaning  OB Pain Intervention: Medication - See MAR  Pain Rating Scale (NPRS): 9  Note: Pt pain not controlled, meds given  6/3/2023 0440 by Noris Castillo, R.N.  Note: Pt pain not controlled, meds given       Patient is not progressing towards the following goals:      Problem: Pain - Standard  Goal: Alleviation of pain or a reduction in pain to the patient’s comfort goal  6/3/2023 0440 by Noris Castillo, R.N.  Outcome: Not Progressing  Flowsheets (Taken 6/3/2023 0440)  Non Verbal Scale: Moaning  OB Pain Intervention: Medication - See MAR  Pain Rating Scale (NPRS): 9  Note: Pt pain not controlled, meds given  6/3/2023 0440 by Noris Castillo, R.N.  Note: Pt pain not controlled, meds given

## 2023-06-04 LAB
ANION GAP SERPL CALC-SCNC: 11 MMOL/L (ref 7–16)
BUN SERPL-MCNC: 12 MG/DL (ref 8–22)
CALCIUM SERPL-MCNC: 8 MG/DL (ref 8.5–10.5)
CHLORIDE SERPL-SCNC: 105 MMOL/L (ref 96–112)
CO2 SERPL-SCNC: 18 MMOL/L (ref 20–33)
CREAT SERPL-MCNC: 1.19 MG/DL (ref 0.5–1.4)
GFR SERPLBLD CREATININE-BSD FMLA CKD-EPI: 55 ML/MIN/1.73 M 2
GLUCOSE SERPL-MCNC: 126 MG/DL (ref 65–99)
MAGNESIUM SERPL-MCNC: 1.5 MG/DL (ref 1.5–2.5)
PHOSPHATE SERPL-MCNC: 4.3 MG/DL (ref 2.5–4.5)
POTASSIUM SERPL-SCNC: 4.3 MMOL/L (ref 3.6–5.5)
SODIUM SERPL-SCNC: 134 MMOL/L (ref 135–145)

## 2023-06-04 PROCEDURE — 36415 COLL VENOUS BLD VENIPUNCTURE: CPT

## 2023-06-04 PROCEDURE — 700111 HCHG RX REV CODE 636 W/ 250 OVERRIDE (IP): Performed by: INTERNAL MEDICINE

## 2023-06-04 PROCEDURE — 80048 BASIC METABOLIC PNL TOTAL CA: CPT

## 2023-06-04 PROCEDURE — 83735 ASSAY OF MAGNESIUM: CPT

## 2023-06-04 PROCEDURE — 770001 HCHG ROOM/CARE - MED/SURG/GYN PRIV*

## 2023-06-04 PROCEDURE — 700102 HCHG RX REV CODE 250 W/ 637 OVERRIDE(OP): Performed by: INTERNAL MEDICINE

## 2023-06-04 PROCEDURE — A9270 NON-COVERED ITEM OR SERVICE: HCPCS | Performed by: HOSPITALIST

## 2023-06-04 PROCEDURE — 99232 SBSQ HOSP IP/OBS MODERATE 35: CPT | Performed by: INTERNAL MEDICINE

## 2023-06-04 PROCEDURE — 700102 HCHG RX REV CODE 250 W/ 637 OVERRIDE(OP): Performed by: HOSPITALIST

## 2023-06-04 PROCEDURE — A9270 NON-COVERED ITEM OR SERVICE: HCPCS | Performed by: INTERNAL MEDICINE

## 2023-06-04 PROCEDURE — 84100 ASSAY OF PHOSPHORUS: CPT

## 2023-06-04 RX ORDER — MAGNESIUM SULFATE HEPTAHYDRATE 40 MG/ML
4 INJECTION, SOLUTION INTRAVENOUS ONCE
Status: DISCONTINUED | OUTPATIENT
Start: 2023-06-04 | End: 2023-06-04

## 2023-06-04 RX ORDER — MAGNESIUM SULFATE HEPTAHYDRATE 40 MG/ML
2 INJECTION, SOLUTION INTRAVENOUS ONCE
Status: COMPLETED | OUTPATIENT
Start: 2023-06-04 | End: 2023-06-04

## 2023-06-04 RX ADMIN — MAGNESIUM SULFATE HEPTAHYDRATE 2 G: 40 INJECTION, SOLUTION INTRAVENOUS at 10:48

## 2023-06-04 RX ADMIN — OXYCODONE HYDROCHLORIDE 10 MG: 10 TABLET ORAL at 17:22

## 2023-06-04 RX ADMIN — OMEPRAZOLE 40 MG: 20 CAPSULE, DELAYED RELEASE ORAL at 20:48

## 2023-06-04 RX ADMIN — QUETIAPINE FUMARATE 100 MG: 100 TABLET ORAL at 17:22

## 2023-06-04 RX ADMIN — OXYCODONE HYDROCHLORIDE 10 MG: 10 TABLET ORAL at 01:29

## 2023-06-04 RX ADMIN — HYDROMORPHONE HYDROCHLORIDE 0.25 MG: 1 INJECTION, SOLUTION INTRAMUSCULAR; INTRAVENOUS; SUBCUTANEOUS at 07:37

## 2023-06-04 RX ADMIN — OXYCODONE HYDROCHLORIDE 10 MG: 10 TABLET ORAL at 10:37

## 2023-06-04 RX ADMIN — OXYCODONE HYDROCHLORIDE 10 MG: 10 TABLET ORAL at 22:26

## 2023-06-04 RX ADMIN — Medication 100 MG: at 04:40

## 2023-06-04 RX ADMIN — PAROXETINE HYDROCHLORIDE 60 MG: 20 TABLET, FILM COATED ORAL at 17:22

## 2023-06-04 RX ADMIN — DOCUSATE SODIUM 50 MG AND SENNOSIDES 8.6 MG 2 TABLET: 8.6; 5 TABLET, FILM COATED ORAL at 04:40

## 2023-06-04 RX ADMIN — ENOXAPARIN SODIUM 40 MG: 100 INJECTION SUBCUTANEOUS at 17:21

## 2023-06-04 RX ADMIN — OXYCODONE HYDROCHLORIDE 10 MG: 10 TABLET ORAL at 06:20

## 2023-06-04 RX ADMIN — ANTACID TABLETS 500 MG: 500 TABLET, CHEWABLE ORAL at 04:41

## 2023-06-04 RX ADMIN — MAGNESIUM SULFATE HEPTAHYDRATE 2 G: 2 INJECTION, SOLUTION INTRAVENOUS at 12:54

## 2023-06-04 RX ADMIN — KETOROLAC TROMETHAMINE 15 MG: 30 INJECTION, SOLUTION INTRAMUSCULAR; INTRAVENOUS at 18:19

## 2023-06-04 RX ADMIN — KETOROLAC TROMETHAMINE 15 MG: 30 INJECTION, SOLUTION INTRAMUSCULAR; INTRAVENOUS at 11:41

## 2023-06-04 ASSESSMENT — PAIN DESCRIPTION - PAIN TYPE
TYPE: ACUTE PAIN

## 2023-06-04 NOTE — PROGRESS NOTES
Hospital Medicine Daily Progress Note    Date of Service  6/4/2023    Chief Complaint  Weakness, AMS, difficulty eating    Hospital Course  Mary Martinez is a 52 y.o. female with bipolar disorder, asthma, rheumatoid arthritis on Enbrel, chronic pain on oxycodone, history of expiratory laparotomy after duodenal perforation (2019), prior cholecystectomy, chronic abdominal pain due to SMA syndrome.  She was noted to have starvation ketosis, severe malnutrition, with oral thrush, along with HASMUKH with UTI.  She was treated with antibiotics, nystatin, along with IV fluids.  Her high anion gap metabolic acidosis and HASMUKH have resolved.  She has completed treatment for UTI.  NGT was placed and tube feeding was started.  Surgery was consulted for G-tube placement, who gave the opinion that this may be difficult due to her prior abdominal surgeries and recommended to discuss with GI for PEG placement once tube feeding is at goal (at 45cc/hr) for at least one week and no further signs of refeeding syndrome.  She was evaluated by PT/OT who recommended SNF placement. SLP recommended level 7 (easy to chew) diet with thin liquids.    Interval Problem Update  6/4/2023 - I reviewed the patient's chart today. Uneventful night. VSS. Afebrile. Saturating well on RA.  Creatinine improved to 1.19.  Sodium 134.  Potassium is normal.  Phosphorus is normal.  Magnesium 1.5.  She is now at 45 cc/h of tube feeding.    > I have personally seen and examined the patient today.  She is tolerating tube feeding well, and and is now at goal.  No nausea.  No abdominal pain.  No chest pain or shortness of breath.  In good spirits today.        I personally reviewed all lab results mentioned above. Prior medical records from this institution and outside facilities were independently reviewed as noted. I also personally reviewed all and consultant recommendations and plans as documented above. History was independently obtained by myself. I have  discussed this patient's plan of care and discharge plan at IDT rounds today with Case Management, Nursing, Nursing leadership, and other members of the IDT team.    Consultants/Specialty  general surgery and GI    Code Status  DNAR/DNI    Disposition  The patient is not medically cleared for discharge to home or a post-acute facility.      Anticipate discharge to SNF.  SNF referrals pending.  I have placed the appropriate orders for post-discharge needs.    Review of Systems  ROS     Pertinent positives/negatives as mentioned above.     A complete review of systems was personally done by me. All other systems were negative.       Physical Exam  Temp:  [36.7 °C (98 °F)-37.1 °C (98.7 °F)] 37.1 °C (98.7 °F)  Pulse:  [81-93] 81  Resp:  [16-17] 17  BP: ()/(59-76) 90/60  SpO2:  [92 %-98 %] 94 %    Physical Exam  Vitals reviewed.   Constitutional:       General: She is not in acute distress.     Appearance: Normal appearance. She is not ill-appearing or diaphoretic.      Comments: Cachectic. Body mass index is 16.41 kg/m².   HENT:      Head: Normocephalic and atraumatic.      Right Ear: External ear normal.      Left Ear: External ear normal.      Nose:      Comments: iris feeding tube in place     Mouth/Throat:      Mouth: Mucous membranes are moist.      Pharynx: No oropharyngeal exudate or posterior oropharyngeal erythema.   Eyes:      General: No scleral icterus.     Extraocular Movements: Extraocular movements intact.      Conjunctiva/sclera: Conjunctivae normal.      Pupils: Pupils are equal, round, and reactive to light.   Cardiovascular:      Rate and Rhythm: Normal rate and regular rhythm.      Heart sounds: Normal heart sounds. No murmur heard.  Pulmonary:      Effort: Pulmonary effort is normal. No respiratory distress.      Breath sounds: Normal breath sounds. No stridor. No wheezing, rhonchi or rales.   Chest:      Chest wall: No tenderness.   Abdominal:      General: Bowel sounds are normal. There is  no distension.      Palpations: Abdomen is soft. There is no mass.      Tenderness: There is no abdominal tenderness. There is no guarding or rebound.   Musculoskeletal:         General: No swelling. Normal range of motion.      Cervical back: Normal range of motion and neck supple. No rigidity. No muscular tenderness.      Right lower leg: No edema.      Left lower leg: No edema.   Lymphadenopathy:      Cervical: No cervical adenopathy.   Skin:     General: Skin is warm and dry.      Coloration: Skin is not jaundiced.      Findings: No rash.   Neurological:      General: No focal deficit present.      Mental Status: She is alert and oriented to person, place, and time. Mental status is at baseline.      Cranial Nerves: No cranial nerve deficit.   Psychiatric:         Mood and Affect: Mood normal.         Behavior: Behavior normal.         Thought Content: Thought content normal.         Judgment: Judgment normal.       I have performed the physical examination today 6/4/2023.  In review of yesterday's note, there are no new changes except as documented above.      Fluids    Intake/Output Summary (Last 24 hours) at 6/4/2023 1100  Last data filed at 6/4/2023 0337  Gross per 24 hour   Intake 450 ml   Output --   Net 450 ml       Laboratory        Recent Labs     06/02/23  2040 06/03/23  0911 06/04/23  0815   SODIUM 135 135 134*   POTASSIUM 4.5 4.6 4.3   CHLORIDE 106 106 105   CO2 20 19* 18*   GLUCOSE 90 116* 126*   BUN 9 10 12   CREATININE 1.32 1.25 1.19   CALCIUM 8.4* 7.9* 8.0*                   Imaging  DX-ABDOMEN FOR TUBE PLACEMENT   Final Result      Enteric tube tip projects over the stomach.      IR-MIDLINE CATHETER INSERTION WO GUIDANCE > AGE 3   Final Result                  Ultrasound-guided midline placement performed by qualified nursing staff    as above.          DX-ABDOMEN FOR TUBE PLACEMENT   Final Result      Nasogastric tube is now satisfactorily with the stomach.      DX-ABDOMEN FOR TUBE PLACEMENT    Final Result      Nasogastric tube side-port is in the distal esophagus. Recommend advancement.           Assessment/Plan  * High anion gap metabolic acidosis- (present on admission)  Assessment & Plan  -Due to starvation ketosis  -Lactic acid negative  -Resolved  -Continue to follow. BMP in AM.        Failure to thrive in adult- (present on admission)  Assessment & Plan  -with severe malnutrition and muscle wasting, cachexia  -Continued tube feeding per NGT.  Now at goal at 45 cc/h.  Monitor for refeeding syndrome.  Also tolerating oral diet per SLP recommendations -  level 7 (easy to chew) diet with thin liquids.  -Throat pain better with exchange of feeding tube to Iris from big bore and rigid NGT.  - Per surgery G-tube placement may be difficult due to her prior abdominal surgeries and recommended to discuss with GI for PEG placement once tube feeding is at goal (at 45cc/hr) for at least one week and no further signs of refeeding syndrome.  If unable to place PEG tube, can consider surgical J tube placement.  I discussed this with GI, who will reevaluate and hopefully plan for PEG tube placement sometime by the end of this coming week.  -Monitor electrolytes and renal function closely watch out for refeeding syndrome.  CMP, magnesium and phosphate in the morning.    Starvation ketoacidosis- (present on admission)  Assessment & Plan  -resolved.     Severe protein-calorie malnutrition (HCC)- (present on admission)  Assessment & Plan  -She remains very high risk for refeeding syndrome, will continue to follow very closely with serial electrolytes.  -Continue tube feeds as above.     UTI (urinary tract infection)- (present on admission)  Assessment & Plan  - Completed course of antibiotics.    Thrush- (present on admission)  Assessment & Plan  -HIV negative.  No further visible thrush.   -Continue nystatin swish and swallow.    Hypocalcemia- (present on admission)  Assessment & Plan  -Improving.    -continue  replacement with tums  -Monitor level daily    Unintentional weight loss- (present on admission)  Assessment & Plan  -Related to SMA and inability to keep food down.  -HIV and lipase negative.  -Malnutrition contributing  -Management as above.    Hypophosphatemia- (present on admission)  Assessment & Plan  -Replaced.  -Resolved but high risk for fluctuation and remains high risk for refeeding syndrome  -Continue to closely follow level daily      HASMUKH (acute kidney injury) ([facility])- (present on admission)  Assessment & Plan  - Creatinine has normalized.   -Off IVF.  -Monitor for improvement in renal function.  BMP in the morning.  - avoid nephrotoxins, and continue to renally dose all medications.    Hypokalemia- (present on admission)  Assessment & Plan  - Potassium WNL this AM despite being off IV KCl.  Magnesium 1.5.  -Replace with magnesium sulfate 4 g IV x 1.  Repeat magnesium level in the morning.  - Monitor potassium level daily, continue to trend and replace as needed.  BMP in AM.    Hyponatremia- (present on admission)  Assessment & Plan  -Likely due to severe dehydration.  Now resolved.  -Follow sodium levels.    Anemia- (present on admission)  Assessment & Plan  -Acute on chronic  -Currently hemodilution and frequent blood draws contributing - trying to limit blood draws.  -Continue to monitor, Hgb has been stable.    Bipolar 1 disorder ([facility])- (present on admission)  Assessment & Plan  -Continue Paxil and Seroquel    SMAS (superior mesenteric artery syndrome) c/b feeding issues ([facility])- (present on admission)  Assessment & Plan  -Contributing to severe malnutrition, chronic abdominal pain.    Chronic pain- (present on admission)  Assessment & Plan  -Patient on home oxycodone but narcotics may be contributing to her chronic emesis.   - Continue Paxil.  Continue oral oxycodone and IV Dilaudid for pain.    Rheumatoid arthritis ([facility])- (present on admission)  Assessment & Plan  -With chronic deformity and s/p  amputations  -No evidence of acute flair  -Continue chronic analgesics.    Tobacco dependence- (present on admission)  Assessment & Plan  - Counseled on smoking cessation.    Pancreatitis- (present on admission)  Assessment & Plan  -H/o. Lipase negative.         VTE prophylaxis: enoxaparin ppx

## 2023-06-04 NOTE — PROGRESS NOTES
Patient A&Ox4. NG tube to [employer] at 40 ml/hr.Tolerating without issues. Bed alarm in place, personal belonging within reach.

## 2023-06-04 NOTE — CARE PLAN
The patient is Watcher - Medium risk of patient condition declining or worsening    Shift Goals  Clinical Goals: Tube feed  Patient Goals: Pain less than 3  Family Goals: n/a    Progress made toward(s) clinical / shift goals:    Problem: Pain - Standard  Goal: Alleviation of pain or a reduction in pain to the patient’s comfort goal  Outcome: Progressing     Problem: Knowledge Deficit - Standard  Goal: Patient and family/care givers will demonstrate understanding of plan of care, disease process/condition, diagnostic tests and medications  Outcome: Progressing     Problem: Fall Risk  Goal: Patient will remain free from falls  Outcome: Progressing     Problem: Skin Integrity  Goal: Skin integrity is maintained or improved  Outcome: Progressing       Patient is not progressing towards the following goals:

## 2023-06-05 LAB
ANION GAP SERPL CALC-SCNC: 13 MMOL/L (ref 7–16)
BUN SERPL-MCNC: 15 MG/DL (ref 8–22)
CALCIUM SERPL-MCNC: 8 MG/DL (ref 8.5–10.5)
CHLORIDE SERPL-SCNC: 104 MMOL/L (ref 96–112)
CO2 SERPL-SCNC: 18 MMOL/L (ref 20–33)
CREAT SERPL-MCNC: 1.14 MG/DL (ref 0.5–1.4)
CRP SERPL HS-MCNC: 3.2 MG/DL (ref 0–0.75)
GFR SERPLBLD CREATININE-BSD FMLA CKD-EPI: 58 ML/MIN/1.73 M 2
GLUCOSE SERPL-MCNC: 108 MG/DL (ref 65–99)
MAGNESIUM SERPL-MCNC: 2.3 MG/DL (ref 1.5–2.5)
PHOSPHATE SERPL-MCNC: 4 MG/DL (ref 2.5–4.5)
POTASSIUM SERPL-SCNC: 4.4 MMOL/L (ref 3.6–5.5)
PREALB SERPL-MCNC: 12.4 MG/DL (ref 18–38)
SODIUM SERPL-SCNC: 135 MMOL/L (ref 135–145)

## 2023-06-05 PROCEDURE — 700102 HCHG RX REV CODE 250 W/ 637 OVERRIDE(OP): Performed by: INTERNAL MEDICINE

## 2023-06-05 PROCEDURE — A9270 NON-COVERED ITEM OR SERVICE: HCPCS | Performed by: INTERNAL MEDICINE

## 2023-06-05 PROCEDURE — 36415 COLL VENOUS BLD VENIPUNCTURE: CPT

## 2023-06-05 PROCEDURE — 700111 HCHG RX REV CODE 636 W/ 250 OVERRIDE (IP): Performed by: INTERNAL MEDICINE

## 2023-06-05 PROCEDURE — 86140 C-REACTIVE PROTEIN: CPT

## 2023-06-05 PROCEDURE — 99232 SBSQ HOSP IP/OBS MODERATE 35: CPT | Performed by: INTERNAL MEDICINE

## 2023-06-05 PROCEDURE — 80048 BASIC METABOLIC PNL TOTAL CA: CPT

## 2023-06-05 PROCEDURE — 99232 SBSQ HOSP IP/OBS MODERATE 35: CPT | Performed by: NURSE PRACTITIONER

## 2023-06-05 PROCEDURE — 83735 ASSAY OF MAGNESIUM: CPT

## 2023-06-05 PROCEDURE — A9270 NON-COVERED ITEM OR SERVICE: HCPCS | Performed by: HOSPITALIST

## 2023-06-05 PROCEDURE — 700102 HCHG RX REV CODE 250 W/ 637 OVERRIDE(OP): Performed by: HOSPITALIST

## 2023-06-05 PROCEDURE — 770001 HCHG ROOM/CARE - MED/SURG/GYN PRIV*

## 2023-06-05 PROCEDURE — 84100 ASSAY OF PHOSPHORUS: CPT

## 2023-06-05 PROCEDURE — 84134 ASSAY OF PREALBUMIN: CPT

## 2023-06-05 RX ADMIN — KETOROLAC TROMETHAMINE 15 MG: 30 INJECTION, SOLUTION INTRAMUSCULAR; INTRAVENOUS at 10:01

## 2023-06-05 RX ADMIN — KETOROLAC TROMETHAMINE 15 MG: 30 INJECTION, SOLUTION INTRAMUSCULAR; INTRAVENOUS at 18:02

## 2023-06-05 RX ADMIN — OXYCODONE HYDROCHLORIDE 10 MG: 10 TABLET ORAL at 22:30

## 2023-06-05 RX ADMIN — KETOROLAC TROMETHAMINE 15 MG: 30 INJECTION, SOLUTION INTRAMUSCULAR; INTRAVENOUS at 00:37

## 2023-06-05 RX ADMIN — OXYCODONE HYDROCHLORIDE 10 MG: 10 TABLET ORAL at 12:54

## 2023-06-05 RX ADMIN — HYDROMORPHONE HYDROCHLORIDE 0.25 MG: 1 INJECTION, SOLUTION INTRAMUSCULAR; INTRAVENOUS; SUBCUTANEOUS at 07:37

## 2023-06-05 RX ADMIN — OMEPRAZOLE 40 MG: 20 CAPSULE, DELAYED RELEASE ORAL at 22:30

## 2023-06-05 RX ADMIN — QUETIAPINE FUMARATE 100 MG: 100 TABLET ORAL at 16:21

## 2023-06-05 RX ADMIN — PAROXETINE HYDROCHLORIDE 60 MG: 20 TABLET, FILM COATED ORAL at 16:21

## 2023-06-05 RX ADMIN — ANTACID TABLETS 500 MG: 500 TABLET, CHEWABLE ORAL at 11:19

## 2023-06-05 RX ADMIN — HYDROMORPHONE HYDROCHLORIDE 0.25 MG: 1 INJECTION, SOLUTION INTRAMUSCULAR; INTRAVENOUS; SUBCUTANEOUS at 15:44

## 2023-06-05 RX ADMIN — ENOXAPARIN SODIUM 40 MG: 100 INJECTION SUBCUTANEOUS at 16:21

## 2023-06-05 ASSESSMENT — PAIN DESCRIPTION - PAIN TYPE
TYPE: ACUTE PAIN

## 2023-06-05 ASSESSMENT — ENCOUNTER SYMPTOMS
DIZZINESS: 0
FEVER: 0
SHORTNESS OF BREATH: 0
ABDOMINAL PAIN: 0
COUGH: 0
WEIGHT LOSS: 1
HEARTBURN: 0
VOMITING: 0
NAUSEA: 0
CHILLS: 0
BLURRED VISION: 0
WEAKNESS: 0
DIARRHEA: 0
BACK PAIN: 0
BLOOD IN STOOL: 0
CONSTIPATION: 0
DEPRESSION: 0

## 2023-06-05 NOTE — PROGRESS NOTES
Hospital Medicine Daily Progress Note    Date of Service  6/5/2023    Chief Complaint  Weakness, AMS, difficulty eating    Hospital Course  Mary Martinez is a 52 y.o. female with bipolar disorder, asthma, rheumatoid arthritis on Enbrel, chronic pain on oxycodone, history of expiratory laparotomy after duodenal perforation (2019), prior cholecystectomy, chronic abdominal pain due to SMA syndrome.  She was noted to have starvation ketosis, severe malnutrition, with oral thrush, along with HASMUKH with UTI.  She was treated with antibiotics, nystatin, along with IV fluids.  Her high anion gap metabolic acidosis and HASMUKH have resolved.  She has completed treatment for UTI.  NGT was placed and tube feeding was started.  Surgery was consulted for G-tube placement, who gave the opinion that this may be difficult due to her prior abdominal surgeries and recommended to discuss with GI for PEG placement once tube feeding is at goal (at 45cc/hr) for at least one week and no further signs of refeeding syndrome.  She was evaluated by PT/OT who recommended SNF placement. SLP recommended level 7 (easy to chew) diet with thin liquids.  Her tube feeding was eventually titrated up (at goal rate on 6/4/2023), which she continued to tolerate.    Interval Problem Update  6/5/2023 - I reviewed the patient's chart. There were no significant overnight events. Remains hemodynamically stable and afebrile. Stable on RA.  Electrolytes are normal.  Renal function stable at 1.14.  She continues to tolerate goal tube feeding rate of 45 cc    > I have personally seen and examined the patient today.  She is comfortable.  Tolerating tube feeding.  No nausea or vomiting.  No abdominal pain.  No diarrhea.  Not short of breath.  She is looking forward to going home.        I personally reviewed all lab results mentioned above. Prior medical records from this institution and outside facilities were independently reviewed as noted. I also personally  reviewed all and consultant recommendations and plans as documented above. History was independently obtained by myself. I have discussed this patient's plan of care and discharge plan at IDT rounds today with Case Management, Nursing, Nursing leadership, and other members of the IDT team.    Consultants/Specialty  general surgery and GI    Code Status  DNAR/DNI    Disposition  The patient is not medically cleared for discharge to home or a post-acute facility.      Anticipate discharge to SNF.  SNF referrals pending.  I have placed the appropriate orders for post-discharge needs.    Review of Systems  ROS     Pertinent positives/negatives as mentioned above.     A complete review of systems was personally done by me. All other systems were negative.       Physical Exam  Temp:  [36.2 °C (97.2 °F)-36.6 °C (97.8 °F)] 36.2 °C (97.2 °F)  Pulse:  [67-87] 67  Resp:  [16-19] 18  BP: ()/(50-64) 96/62  SpO2:  [93 %-95 %] 94 %    Physical Exam  Vitals reviewed.   Constitutional:       General: She is not in acute distress.     Appearance: Normal appearance. She is not ill-appearing or diaphoretic.      Comments: Cachectic. Body mass index is 16.41 kg/m².   HENT:      Head: Normocephalic and atraumatic.      Right Ear: External ear normal.      Left Ear: External ear normal.      Nose:      Comments: iris feeding tube in place     Mouth/Throat:      Mouth: Mucous membranes are moist.      Pharynx: No oropharyngeal exudate or posterior oropharyngeal erythema.   Eyes:      General: No scleral icterus.     Extraocular Movements: Extraocular movements intact.      Conjunctiva/sclera: Conjunctivae normal.      Pupils: Pupils are equal, round, and reactive to light.   Cardiovascular:      Rate and Rhythm: Normal rate and regular rhythm.      Heart sounds: Normal heart sounds. No murmur heard.  Pulmonary:      Effort: Pulmonary effort is normal. No respiratory distress.      Breath sounds: Normal breath sounds. No stridor. No  wheezing, rhonchi or rales.   Chest:      Chest wall: No tenderness.   Abdominal:      General: Bowel sounds are normal. There is no distension.      Palpations: Abdomen is soft. There is no mass.      Tenderness: There is no abdominal tenderness. There is no guarding or rebound.   Musculoskeletal:         General: No swelling. Normal range of motion.      Cervical back: Normal range of motion and neck supple. No rigidity. No muscular tenderness.      Right lower leg: No edema.      Left lower leg: No edema.   Lymphadenopathy:      Cervical: No cervical adenopathy.   Skin:     General: Skin is warm and dry.      Coloration: Skin is not jaundiced.      Findings: No rash.   Neurological:      General: No focal deficit present.      Mental Status: She is alert and oriented to person, place, and time. Mental status is at baseline.      Cranial Nerves: No cranial nerve deficit.   Psychiatric:         Mood and Affect: Mood normal.         Behavior: Behavior normal.         Thought Content: Thought content normal.         Judgment: Judgment normal.       I have performed the physical examination today 6/5/2023.  In review of yesterday's note, there are no new changes except as documented above.      Fluids    Intake/Output Summary (Last 24 hours) at 6/5/2023 1056  Last data filed at 6/5/2023 0737  Gross per 24 hour   Intake 390 ml   Output 400 ml   Net -10 ml       Laboratory        Recent Labs     06/03/23  0911 06/04/23  0815 06/05/23  0627   SODIUM 135 134* 135   POTASSIUM 4.6 4.3 4.4   CHLORIDE 106 105 104   CO2 19* 18* 18*   GLUCOSE 116* 126* 108*   BUN 10 12 15   CREATININE 1.25 1.19 1.14   CALCIUM 7.9* 8.0* 8.0*                   Imaging  DX-ABDOMEN FOR TUBE PLACEMENT   Final Result      Enteric tube tip projects over the stomach.      IR-MIDLINE CATHETER INSERTION WO GUIDANCE > AGE 3   Final Result                  Ultrasound-guided midline placement performed by qualified nursing staff    as above.           DX-ABDOMEN FOR TUBE PLACEMENT   Final Result      Nasogastric tube is now satisfactorily with the stomach.      DX-ABDOMEN FOR TUBE PLACEMENT   Final Result      Nasogastric tube side-port is in the distal esophagus. Recommend advancement.           Assessment/Plan  * High anion gap metabolic acidosis- (present on admission)  Assessment & Plan  -Due to starvation ketosis  -Lactic acid negative  -Resolved  -Continue to follow. BMP in AM.        Failure to thrive in adult- (present on admission)  Assessment & Plan  -with severe malnutrition and muscle wasting, cachexia  -Continued tube feeding per NGT.  Now at goal at 45 cc/h.  Monitor for refeeding syndrome.  Also tolerating oral diet per SLP recommendations -  level 7 (easy to chew) diet with thin liquids.  -Throat pain better with exchange of feeding tube to Iris from big bore and rigid NGT.  - Per surgery G-tube placement may be difficult due to her prior abdominal surgeries and recommended to discuss with GI for PEG placement once tube feeding is at goal (at 45cc/hr) for at least one week and no further signs of refeeding syndrome.  If unable to place PEG tube, can consider surgical J tube placement.  I again discussed this with GI, who will reevaluate, will likely plan for permanent enteric access placement on Sunday or Monday.  She will need PEG-J tube placement.  -Monitor electrolytes and renal function closely watch out for refeeding syndrome.  CMP, magnesium and phosphate in the morning.    Starvation ketoacidosis- (present on admission)  Assessment & Plan  -resolved.     Severe protein-calorie malnutrition (HCC)- (present on admission)  Assessment & Plan  -She remains very high risk for refeeding syndrome, will continue to follow very closely with serial electrolytes.  -Continue tube feeds as above.     UTI (urinary tract infection)- (present on admission)  Assessment & Plan  - Completed course of antibiotics.    Thrush- (present on admission)  Assessment  & Plan  -HIV negative.  No further visible thrush.   -Continue nystatin swish and swallow.    Hypocalcemia- (present on admission)  Assessment & Plan  -Improving.    -continue replacement with tums  -Monitor level daily    Unintentional weight loss- (present on admission)  Assessment & Plan  -Related to SMA and inability to keep food down.  -HIV and lipase negative.  -Malnutrition contributing  -Management as above.    Hypophosphatemia- (present on admission)  Assessment & Plan  -Replaced.  -Resolved but high risk for fluctuation and remains high risk for refeeding syndrome  -Continue to closely follow level daily      HASMUKH (acute kidney injury) (HCC)- (present on admission)  Assessment & Plan  - Creatinine has normalized.   -Off IVF.  -Monitor for improvement in renal function.  BMP in the morning.  - avoid nephrotoxins, and continue to renally dose all medications.    Hypokalemia- (present on admission)  Assessment & Plan  - Replaced, potassium has normalized.  Magnesium has improved with replacement.    - Monitor potassium and magnesium level daily, continue to trend and replace as needed.  BMP in AM.    Hyponatremia- (present on admission)  Assessment & Plan  -Likely due to severe dehydration.  Now resolved.  -Follow sodium levels.    Anemia- (present on admission)  Assessment & Plan  -Acute on chronic  -Currently hemodilution and frequent blood draws contributing - trying to limit blood draws.  -Continue to monitor, Hgb has been stable.    Bipolar 1 disorder ([facility])- (present on admission)  Assessment & Plan  -Continue Paxil and Seroquel    SMAS (superior mesenteric artery syndrome) c/b feeding issues ([facility])- (present on admission)  Assessment & Plan  -Contributing to severe malnutrition, chronic abdominal pain.    Chronic pain- (present on admission)  Assessment & Plan  -Patient on home oxycodone but narcotics may be contributing to her chronic emesis.   - Continue Paxil.  Continue oral oxycodone and IV Dilaudid  for pain.    Rheumatoid arthritis (HCC)- (present on admission)  Assessment & Plan  -With chronic deformity and s/p amputations  -No evidence of acute flair  -Continue chronic analgesics.    Tobacco dependence- (present on admission)  Assessment & Plan  - Counseled on smoking cessation.    Pancreatitis- (present on admission)  Assessment & Plan  -H/o. Lipase negative.         VTE prophylaxis: enoxaparin ppx

## 2023-06-05 NOTE — PROGRESS NOTES
Patient received resting bed. A&Ox4. Tolerating NG tube with feeding continuous at 45ml/hrs without issues. Bed in low and locked position. Personal belonging within reach.

## 2023-06-05 NOTE — PROGRESS NOTES
..Gastroenterology Progress Note               Author:  BIANCA Peterson Date & Time Created: 6/5/2023 10:11 AM       Patient ID:  Name:             Mary Martinez  YOB: 1970  Age:                 53 y.o.  female  MRN:               [id number]        Medical Decision Making, by Problem:  Active Hospital Problems    Diagnosis     Hypocalcemia [E83.51]     Failure to thrive in adult [R62.7]     Thrush [B37.0]     UTI (urinary tract infection) [N39.0]     Anemia [D64.9]     Bipolar 1 disorder (HCC) [F31.9]     Starvation ketoacidosis [T73.0XXA, E87.29]     Severe protein-calorie malnutrition (HCC) [E43]     SMAS (superior mesenteric artery syndrome) c/b feeding issues (HCC) [K55.1]     Unintentional weight loss [R63.4]     Chronic pain [G89.29]     Hypophosphatemia [E83.39]     High anion gap metabolic acidosis [E87.29]     HASMUKH (acute kidney injury) (HCC) [N17.9]     Hypokalemia [E87.6]     Rheumatoid arthritis (HCC) [M06.9]     Tobacco dependence [F17.200]     Pancreatitis [K85.90]     Hyponatremia [E87.1]            Presenting Chief Complaint:  Consideration for PEG-J tube    Interval History:  Patient is a 53-year-old female with past medical history significant for chronic abdominal pain, failure to thrive, inability to take oral intake secondary to nausea and vomiting who requires a feeding tube for nutrition. Surgery recommended PEG-J.     6/5/2023:  Patient seen and examined  Complaining of dry mouth and pain secondary to iris feeding tube  Urinating and stooling  Electrolytes stable    Hospital Medications:  Current Facility-Administered Medications   Medication Dose Frequency Provider Last Rate Last Admin    omeprazole (PRILOSEC) capsule 40 mg  40 mg Nightly Ba Persaud M.D.   40 mg at 06/04/23 2048    lidocaine (XYLOCAINE) 2 % viscous solution 15 mL  15 mL Q3HRS PRN Ba Persaud M.D.   15 mL at 06/03/23 1609    oxyCODONE immediate release (ROXICODONE) tablet 10 mg   10 mg Q4HRS PRN Ba Persaud M.D.   10 mg at 06/04/23 2226    Or    HYDROmorphone (Dilaudid) injection 0.25 mg  0.25 mg Q8HRS PRN Ba Persaud M.D.   0.25 mg at 06/05/23 0737    ketorolac (TORADOL) injection 15 mg  15 mg Q6HRS PRN Ba Persaud M.D.   15 mg at 06/05/23 1001    sore throat spray (Chloraseptic) 1 Spray  1 [place] Q2HRS PRN Krysta Arana M.D.   1 Spray at 06/02/23 2224    Pharmacy Consult: Enteral tube insertion - review meds/change route/product selection   PHARMACY TO DOSE Disha Segal M.D.        calcium carbonate (Tums) chewable tab 500 mg  500 mg TID Disha Segal M.D.   500 mg at 06/04/23 0441    PARoxetine (PAXIL) tablet 60 mg  60 mg Q EVENING Disha Segal M.D.   60 mg at 06/04/23 1722    QUEtiapine (Seroquel) tablet 100 mg  100 mg Q EVENING Disha Segal M.D.   100 mg at 06/04/23 1722    senna-docusate (PERICOLACE or SENOKOT S) 8.6-50 MG per tablet 2 Tablet  2 Tablet BID Disha Segal M.D.   2 Tablet at 06/04/23 0440    And    polyethylene glycol/lytes (MIRALAX) PACKET 1 Packet  1 Packet QDAY PRN Disha Segal M.D.        And    magnesium hydroxide (MILK OF MAGNESIA) suspension 30 mL  30 mL QDAY PRN Disha Segal M.D.        And    bisacodyl (DULCOLAX) suppository 10 mg  10 mg QDAY PRN Disha Segal M.D.        acetaminophen (Tylenol) tablet 650 mg  650 mg Q6HRS PRN Disha Segal M.D.        ondansetron (ZOFRAN ODT) dispertab 4 mg  4 mg Q4HRS PRN Disha Segal M.D.   4 mg at 06/03/23 2005    promethazine (PHENERGAN) tablet 12.5-25 mg  12.5-25 mg Q4HRS PRN Disha Segal M.D.        thiamine (Vitamin B-1) tablet 100 mg  100 mg DAILY Disha Segal M.D.   100 mg at 06/04/23 0440    enoxaparin (Lovenox) inj 40 mg  40 mg DAILY AT 1800 Eloise Chin D.O.   40 mg at 06/04/23 1721    Pharmacy Consult Request ...Pain Management Review 1 Each  1 Each PHARMACY TO DOSE Eloise Chin D.O.        ondansetron (ZOFRAN) syringe/vial injection 4 mg  4 mg Q4HRS PRN Eloise Chin D.O.   4 mg at  "06/02/23 1801    promethazine (PHENERGAN) suppository 12.5-25 mg  12.5-25 mg Q4HRS PRN HEAVEN BrownO.        prochlorperazine (COMPAZINE) injection 5-10 mg  5-10 mg Q4HRS PRN DEEJAY Brown.O.   10 mg at 06/02/23 2212    labetalol (NORMODYNE/TRANDATE) injection 10 mg  10 mg Q4HRS PRN DEEJAY Brown.O.       Last reviewed on 5/25/2023  1:45 PM by Arnulfo Pablo       Review of Systems:  Review of Systems   Constitutional:  Positive for malaise/fatigue and weight loss. Negative for chills and fever.   HENT:  Negative for hearing loss.         Dry mouth   Eyes:  Negative for blurred vision.   Respiratory:  Negative for cough and shortness of breath.    Cardiovascular:  Negative for chest pain and leg swelling.   Gastrointestinal:  Negative for abdominal pain, blood in stool, constipation, diarrhea, heartburn, melena, nausea and vomiting.   Genitourinary:  Negative for dysuria.   Musculoskeletal:  Negative for back pain.   Skin:  Negative for rash.   Neurological:  Negative for dizziness and weakness.   Psychiatric/Behavioral:  Negative for depression.    All other systems reviewed and are negative.        Vital signs:  Weight/BMI: Body mass index is 16.41 kg/m².  BP 96/62   Pulse 67   Temp 36.2 °C (97.2 °F) (Temporal)   Resp 18   Ht 1.6 m (5' 2.99\")   Wt 42 kg (92 lb 9.5 oz)   SpO2 94%   Vitals:    06/05/23 0401 06/05/23 0613 06/05/23 0731 06/05/23 0958   BP: (!) 85/61 96/62     Pulse: 69 67     Resp: 17  18 18   Temp: 36.2 °C (97.2 °F)      TempSrc: Temporal      SpO2: 94%      Weight:       Height:         Oxygen Therapy:  Pulse Oximetry: 94 %, O2 (LPM): 0, O2 Delivery Device: None - Room Air    Intake/Output Summary (Last 24 hours) at 6/5/2023 1011  Last data filed at 6/5/2023 0737  Gross per 24 hour   Intake 630 ml   Output 400 ml   Net 230 ml         Physical Exam:  Physical Exam  Vitals and nursing note reviewed.   Constitutional:       Comments: Frail and cachectic   HENT:      Head: " Normocephalic and atraumatic.      Right Ear: External ear normal.      Left Ear: External ear normal.      Nose: Nose normal.      Mouth/Throat:      Mouth: Mucous membranes are dry.      Pharynx: Oropharynx is clear.      Comments: Dry mucous membranes  Eyes:      General: No scleral icterus.  Cardiovascular:      Rate and Rhythm: Normal rate and regular rhythm.      Pulses: Normal pulses.      Heart sounds: Normal heart sounds.   Pulmonary:      Effort: Pulmonary effort is normal. No respiratory distress.      Breath sounds: Normal breath sounds.   Abdominal:      General: Abdomen is flat. Bowel sounds are normal. There is no distension.      Palpations: Abdomen is soft.      Tenderness: There is no abdominal tenderness.   Musculoskeletal:         General: Normal range of motion.      Cervical back: Normal range of motion.   Skin:     General: Skin is warm and dry.      Capillary Refill: Capillary refill takes less than 2 seconds.   Neurological:      Mental Status: She is alert and oriented to person, place, and time.   Psychiatric:         Mood and Affect: Mood normal.         Behavior: Behavior normal.             Labs:  Recent Labs     06/03/23  0911 06/04/23  0815 06/05/23 0627   SODIUM 135 134* 135   POTASSIUM 4.6 4.3 4.4   CHLORIDE 106 105 104   CO2 19* 18* 18*   BUN 10 12 15   CREATININE 1.25 1.19 1.14   MAGNESIUM  --  1.5 2.3   PHOSPHORUS  --  4.3 4.0   CALCIUM 7.9* 8.0* 8.0*     Recent Labs     06/03/23  0911 06/04/23  0815 06/05/23  0627   GLUCOSE 116* 126* 108*         No results for input(s): RBC, HEMOGLOBIN, HEMATOCRIT, PLATELETCT, PROTHROMBTM, APTT, INR, IRON, FERRITIN, TOTIRONBC in the last 72 hours.  Recent Results (from the past 24 hour(s))   MAGNESIUM    Collection Time: 06/05/23  6:27 AM   Result Value Ref Range    Magnesium 2.3 1.5 - 2.5 mg/dL   PHOSPHORUS    Collection Time: 06/05/23  6:27 AM   Result Value Ref Range    Phosphorus 4.0 2.5 - 4.5 mg/dL   Basic Metabolic Panel    Collection  "Time: 06/05/23  6:27 AM   Result Value Ref Range    Sodium 135 135 - 145 mmol/L    Potassium 4.4 3.6 - 5.5 mmol/L    Chloride 104 96 - 112 mmol/L    Co2 18 (L) 20 - 33 mmol/L    Glucose 108 (H) 65 - 99 mg/dL    Bun 15 8 - 22 mg/dL    Creatinine 1.14 0.50 - 1.40 mg/dL    Calcium 8.0 (L) 8.5 - 10.5 mg/dL    Anion Gap 13.0 7.0 - 16.0   ESTIMATED GFR    Collection Time: 06/05/23  6:27 AM   Result Value Ref Range    GFR (CKD-EPI) 58 (A) >60 mL/min/1.73 m 2       Radiology Review:  DX-ABDOMEN FOR TUBE PLACEMENT   Final Result      Enteric tube tip projects over the stomach.      IR-MIDLINE CATHETER INSERTION WO GUIDANCE > AGE 3   Final Result                  Ultrasound-guided midline placement performed by qualified nursing staff    as above.          DX-ABDOMEN FOR TUBE PLACEMENT   Final Result      Nasogastric tube is now satisfactorily with the stomach.      DX-ABDOMEN FOR TUBE PLACEMENT   Final Result      Nasogastric tube side-port is in the distal esophagus. Recommend advancement.            MDM (Data Review):   -Records reviewed and summarized in current documentation  -I personally reviewed and interpreted the laboratory results  -I personally reviewed the radiology images    Assessment/Recommendations:  Impressions:  Failure to thrive  Starvation ketoacidosis  Chronic abdominal pain  Severe malnutrition  Hepatomegaly   Acute kidney injury  High anion gap metabolic acidosis  Electrolyte derangements  Hypokalemia  Hyponatremia  Hypocalcemia  Urinary tract infection  Thrush  Bipolar 1 disorder    Recommendations:  Per surgery Dr. Marlow 5/30: \"Would recommend she be tolerating goal tube feeds (45cc/hr) for one week without significant electrolyte dyscrasias before any feeding access be attempted\"    -Goal tube feeding reached yesterday, 6/4, therefore earliest PEG-J is 6/11  - PEG not done on the weekends so we will plan for PEG-J placement 6/12  -Continue to optimize patient's electrolytes  - GI team will not see " every day, will plan to see patient Sunday June 11 and place orders for June 12    Plan of care discussed with patient, RN, Dr. Persaud, and Dr. Peres    Core Quality Measures   Reviewed items::  Labs, Medications and Radiology reports reviewed

## 2023-06-05 NOTE — PROGRESS NOTES
Rec'd pt report.  Pt ao x 4, bed bound, pleasant and cooperative.  Pt 1 assist and uses bed pan, calls appropriately.  Pt meds passed per MAR, no injuries this shift.  Pt tablet delivered from previous floor.  Call light in reach, bed at lowest position, no needs at this time.

## 2023-06-05 NOTE — CARE PLAN
The patient is Stable - Low risk of patient condition declining or worsening    Shift Goals  Clinical Goals: tube feeds, peg tube placement  Patient Goals: rest  Family Goals: NAIN    Progress made toward(s) clinical / shift goals:  pt meds passed per MAR, no injuries this shift      Problem: Pain - Standard  Goal: Alleviation of pain or a reduction in pain to the patient’s comfort goal  Outcome: Progressing     Problem: Knowledge Deficit - Standard  Goal: Patient and family/care givers will demonstrate understanding of plan of care, disease process/condition, diagnostic tests and medications  Outcome: Progressing     Problem: Fall Risk  Goal: Patient will remain free from falls  Outcome: Progressing     Problem: Skin Integrity  Goal: Skin integrity is maintained or improved  Outcome: Progressing       Patient is not progressing towards the following goals:

## 2023-06-06 LAB
ANION GAP SERPL CALC-SCNC: 10 MMOL/L (ref 7–16)
BUN SERPL-MCNC: 16 MG/DL (ref 8–22)
CALCIUM SERPL-MCNC: 8.1 MG/DL (ref 8.5–10.5)
CHLORIDE SERPL-SCNC: 105 MMOL/L (ref 96–112)
CO2 SERPL-SCNC: 20 MMOL/L (ref 20–33)
CREAT SERPL-MCNC: 0.97 MG/DL (ref 0.5–1.4)
EKG IMPRESSION: NORMAL
GFR SERPLBLD CREATININE-BSD FMLA CKD-EPI: 71 ML/MIN/1.73 M 2
GLUCOSE SERPL-MCNC: 109 MG/DL (ref 65–99)
MAGNESIUM SERPL-MCNC: 1.8 MG/DL (ref 1.5–2.5)
PHOSPHATE SERPL-MCNC: 3.9 MG/DL (ref 2.5–4.5)
POTASSIUM SERPL-SCNC: 4.4 MMOL/L (ref 3.6–5.5)
SODIUM SERPL-SCNC: 135 MMOL/L (ref 135–145)

## 2023-06-06 PROCEDURE — 97530 THERAPEUTIC ACTIVITIES: CPT

## 2023-06-06 PROCEDURE — 700111 HCHG RX REV CODE 636 W/ 250 OVERRIDE (IP): Performed by: INTERNAL MEDICINE

## 2023-06-06 PROCEDURE — 93005 ELECTROCARDIOGRAM TRACING: CPT | Performed by: HOSPITALIST

## 2023-06-06 PROCEDURE — 99232 SBSQ HOSP IP/OBS MODERATE 35: CPT | Performed by: HOSPITALIST

## 2023-06-06 PROCEDURE — A9270 NON-COVERED ITEM OR SERVICE: HCPCS | Performed by: HOSPITALIST

## 2023-06-06 PROCEDURE — A9270 NON-COVERED ITEM OR SERVICE: HCPCS | Performed by: INTERNAL MEDICINE

## 2023-06-06 PROCEDURE — 93010 ELECTROCARDIOGRAM REPORT: CPT | Performed by: INTERNAL MEDICINE

## 2023-06-06 PROCEDURE — 80048 BASIC METABOLIC PNL TOTAL CA: CPT

## 2023-06-06 PROCEDURE — 770001 HCHG ROOM/CARE - MED/SURG/GYN PRIV*

## 2023-06-06 PROCEDURE — 700101 HCHG RX REV CODE 250: Performed by: HOSPITALIST

## 2023-06-06 PROCEDURE — 700102 HCHG RX REV CODE 250 W/ 637 OVERRIDE(OP): Performed by: INTERNAL MEDICINE

## 2023-06-06 PROCEDURE — 83735 ASSAY OF MAGNESIUM: CPT

## 2023-06-06 PROCEDURE — 84100 ASSAY OF PHOSPHORUS: CPT

## 2023-06-06 PROCEDURE — 700102 HCHG RX REV CODE 250 W/ 637 OVERRIDE(OP): Performed by: HOSPITALIST

## 2023-06-06 PROCEDURE — 700111 HCHG RX REV CODE 636 W/ 250 OVERRIDE (IP): Performed by: HOSPITALIST

## 2023-06-06 PROCEDURE — 36415 COLL VENOUS BLD VENIPUNCTURE: CPT

## 2023-06-06 RX ORDER — LIDOCAINE 50 MG/G
1 PATCH TOPICAL EVERY 24 HOURS
Status: DISCONTINUED | OUTPATIENT
Start: 2023-06-06 | End: 2023-06-13 | Stop reason: HOSPADM

## 2023-06-06 RX ORDER — MAGNESIUM SULFATE HEPTAHYDRATE 40 MG/ML
2 INJECTION, SOLUTION INTRAVENOUS ONCE
Status: COMPLETED | OUTPATIENT
Start: 2023-06-06 | End: 2023-06-06

## 2023-06-06 RX ADMIN — OXYCODONE HYDROCHLORIDE 10 MG: 10 TABLET ORAL at 19:32

## 2023-06-06 RX ADMIN — KETOROLAC TROMETHAMINE 15 MG: 30 INJECTION, SOLUTION INTRAMUSCULAR; INTRAVENOUS at 16:08

## 2023-06-06 RX ADMIN — LIDOCAINE 1 PATCH: 50 PATCH CUTANEOUS at 17:15

## 2023-06-06 RX ADMIN — QUETIAPINE FUMARATE 100 MG: 100 TABLET ORAL at 16:09

## 2023-06-06 RX ADMIN — OXYCODONE HYDROCHLORIDE 10 MG: 10 TABLET ORAL at 13:52

## 2023-06-06 RX ADMIN — PAROXETINE HYDROCHLORIDE 60 MG: 20 TABLET, FILM COATED ORAL at 16:09

## 2023-06-06 RX ADMIN — OXYCODONE HYDROCHLORIDE 10 MG: 10 TABLET ORAL at 03:04

## 2023-06-06 RX ADMIN — OMEPRAZOLE 40 MG: 20 CAPSULE, DELAYED RELEASE ORAL at 21:15

## 2023-06-06 RX ADMIN — KETOROLAC TROMETHAMINE 15 MG: 30 INJECTION, SOLUTION INTRAMUSCULAR; INTRAVENOUS at 08:04

## 2023-06-06 RX ADMIN — ENOXAPARIN SODIUM 40 MG: 100 INJECTION SUBCUTANEOUS at 16:09

## 2023-06-06 RX ADMIN — MAGNESIUM SULFATE HEPTAHYDRATE 2 G: 2 INJECTION, SOLUTION INTRAVENOUS at 19:52

## 2023-06-06 ASSESSMENT — PAIN DESCRIPTION - PAIN TYPE
TYPE: ACUTE PAIN

## 2023-06-06 ASSESSMENT — COGNITIVE AND FUNCTIONAL STATUS - GENERAL
WALKING IN HOSPITAL ROOM: A LOT
TURNING FROM BACK TO SIDE WHILE IN FLAT BAD: A LITTLE
CLIMB 3 TO 5 STEPS WITH RAILING: TOTAL
SUGGESTED CMS G CODE MODIFIER MOBILITY: CK
STANDING UP FROM CHAIR USING ARMS: A LITTLE
MOVING TO AND FROM BED TO CHAIR: A LITTLE
MOVING FROM LYING ON BACK TO SITTING ON SIDE OF FLAT BED: A LITTLE
MOBILITY SCORE: 15

## 2023-06-06 NOTE — THERAPY
"Physical Therapy   Daily Treatment     Patient Name: Mary Martinez  Age:  53 y.o., Sex:  female  Medical Record #: [id number]  Today's Date: 6/6/2023     Precautions  Precautions: Fall Risk;Swallow Precautions    Assessment    Patient progressing with functional mobility and demonstrated improved participation and performance today. Patient is very particular about assist and requested therapist not touch her or hover too closely as this makes her feel unsteady and at risk of fall. Patient verbalized understanding regarding reason for therapist assist and proximity but asked to attempt standing without physical assist despite understanding. She was capable of standing from EOB with FWW without assist but demonstrated compensatory technique to do so. She was able to weight shift for side steps up EOB with supervision and FWW but declined attempts to transfer or walk. She is agreeable to attempting transfer to chair tomorrow with RN staff. Recommend patient mobilize to EOB at least 3x/day at meal times with RN staff to progress strength and tolerance. Patient verbalized motivation to do this. Will continue to follow.    Plan    Treatment Plan Status: Continue Current Treatment Plan  Type of Treatment: Bed Mobility, Gait Training, Neuro Re-Education / Balance, Self Care / Home Evaluation, Stair Training, Therapeutic Activities, Therapeutic Exercise  Treatment Frequency: 4 Times per Week  Treatment Duration: Until Therapy Goals Met    DC Equipment Recommendations: Unable to determine at this time  Discharge Recommendations: Recommend post-acute placement for additional physical therapy services prior to discharge home      Subjective    \"It makes me feel like I'm off balance if you touch me!\"     Objective       06/06/23 1532   Charge Group   Charges  Yes   PT Therapeutic Activities (Units) 1   Total Time Spent   PT Therapeutic Activities Time Spent (Mins) 16   PT Total Time Spent (Calculated) 16   Precautions "   Precautions Fall Risk;Swallow Precautions   Vitals   O2 (LPM) 0   O2 Delivery Device None - Room Air   Pain 0 - 10 Group   Therapist Pain Assessment   (no pain complaint during session)   Cognition    Cognition / Consciousness WDL   Level of Consciousness Alert   Comments somewhat irritable but improved mood throughout   Active ROM Lower Body    Comments decreased ROM in knees and hips in standing, patient attributes to RA   Balance   Sitting Balance (Static) Fair   Sitting Balance (Dynamic) Fair   Standing Balance (Static) Fair -   Standing Balance (Dynamic) Fair -   Weight Shift Sitting Fair   Weight Shift Standing Fair   Skilled Intervention Verbal Cuing;Compensatory Strategies;Sequencing   Comments no LOB sitting EOB. standing with FWW, required seated rest between bouts   Bed Mobility    Supine to Sit Supervised   Sit to Supine Supervised   Scooting Supervised   Skilled Intervention Verbal Cuing;Compensatory Strategies;Sequencing   Gait Analysis   Gait Level Of Assist   (declined attempts today)   Weight Bearing Status no restrictions   Vision Deficits Impacting Mobility NT   Functional Mobility   Sit to Stand Standby Assist   Bed, Chair, Wheelchair Transfer Refused   Transfer Method   (STS only, appears capable of transfer)   Skilled Intervention Verbal Cuing;Compensatory Strategies;Sequencing   How much difficulty does the patient currently have...   Turning over in bed (including adjusting bedclothes, sheets and blankets)? 3   Sitting down on and standing up from a chair with arms (e.g., wheelchair, bedside commode, etc.) 3   Moving from lying on back to sitting on the side of the bed? 3   How much help from another person does the patient currently need...   Moving to and from a bed to a chair (including a wheelchair)? 3   Need to walk in a hospital room? 2   Climbing 3-5 steps with a railing? 1   6 clicks Mobility Score 15   Short Term Goals    Short Term Goal # 1 pt will perform supine <> sit without  bed features with SPV in 6 visits to get in/out of bed at home   Goal Outcome # 1 Progressing as expected   Short Term Goal # 2 pt will perform all functional xfrs with SPV in 6 visits for improved independence   Goal Outcome # 2 Progressing as expected   Short Term Goal # 3 pt will ambulate 50ft with FWW and SPV in 6 visits for improved independence   Goal Outcome # 3 Goal not met   Short Term Goal # 4 pt will negotiate 1 step with SPV in 6 visits to access home   Goal Outcome # 4 Goal not met   Physical Therapy Treatment Plan   Physical Therapy Treatment Plan Continue Current Treatment Plan   Anticipated Discharge Equipment and Recommendations   DC Equipment Recommendations Unable to determine at this time   Discharge Recommendations Recommend post-acute placement for additional physical therapy services prior to discharge home   Interdisciplinary Plan of Care Collaboration   IDT Collaboration with  Nursing   Patient Position at End of Therapy In Bed;Call Light within Reach;Tray Table within Reach;Phone within Reach   Collaboration Comments RN aware of visit, response   Session Information   Date / Session Number  6/6-4 (1/4, 6/9)

## 2023-06-06 NOTE — PROGRESS NOTES
Rec'd pt report.  Pt aox4, cooperative.  Pt meds passed per MAR, no injuries this shift.  Pt uses a bed pan for the bathroom and mainly stays in bed.  Pt call light in reach, bed at lowest position, no needs at this time.

## 2023-06-06 NOTE — CARE PLAN
The patient is Stable - Low risk of patient condition declining or worsening    Shift Goals  Clinical Goals: tube feed tolerance, peg tube placement  Patient Goals: pain less than 3, sore throat mgmt  Family Goals: NAIN    Progress made toward(s) clinical / shift goals:  pt meds passed per MAR, no injuries this shift      Problem: Pain - Standard  Goal: Alleviation of pain or a reduction in pain to the patient’s comfort goal  Outcome: Progressing     Problem: Knowledge Deficit - Standard  Goal: Patient and family/care givers will demonstrate understanding of plan of care, disease process/condition, diagnostic tests and medications  Outcome: Progressing     Problem: Fall Risk  Goal: Patient will remain free from falls  Outcome: Progressing     Problem: Skin Integrity  Goal: Skin integrity is maintained or improved  Outcome: Progressing       Patient is not progressing towards the following goals:

## 2023-06-07 LAB
ANION GAP SERPL CALC-SCNC: 12 MMOL/L (ref 7–16)
BUN SERPL-MCNC: 15 MG/DL (ref 8–22)
CALCIUM SERPL-MCNC: 8.3 MG/DL (ref 8.5–10.5)
CHLORIDE SERPL-SCNC: 105 MMOL/L (ref 96–112)
CO2 SERPL-SCNC: 20 MMOL/L (ref 20–33)
CREAT SERPL-MCNC: 0.85 MG/DL (ref 0.5–1.4)
GFR SERPLBLD CREATININE-BSD FMLA CKD-EPI: 83 ML/MIN/1.73 M 2
GLUCOSE SERPL-MCNC: 125 MG/DL (ref 65–99)
MAGNESIUM SERPL-MCNC: 2.1 MG/DL (ref 1.5–2.5)
PHOSPHATE SERPL-MCNC: 3.6 MG/DL (ref 2.5–4.5)
POTASSIUM SERPL-SCNC: 4.1 MMOL/L (ref 3.6–5.5)
SODIUM SERPL-SCNC: 137 MMOL/L (ref 135–145)

## 2023-06-07 PROCEDURE — 700111 HCHG RX REV CODE 636 W/ 250 OVERRIDE (IP): Performed by: INTERNAL MEDICINE

## 2023-06-07 PROCEDURE — 700102 HCHG RX REV CODE 250 W/ 637 OVERRIDE(OP): Performed by: INTERNAL MEDICINE

## 2023-06-07 PROCEDURE — 770001 HCHG ROOM/CARE - MED/SURG/GYN PRIV*

## 2023-06-07 PROCEDURE — A9270 NON-COVERED ITEM OR SERVICE: HCPCS | Performed by: INTERNAL MEDICINE

## 2023-06-07 PROCEDURE — 36415 COLL VENOUS BLD VENIPUNCTURE: CPT

## 2023-06-07 PROCEDURE — 84100 ASSAY OF PHOSPHORUS: CPT

## 2023-06-07 PROCEDURE — 80048 BASIC METABOLIC PNL TOTAL CA: CPT

## 2023-06-07 PROCEDURE — 83735 ASSAY OF MAGNESIUM: CPT

## 2023-06-07 PROCEDURE — 99232 SBSQ HOSP IP/OBS MODERATE 35: CPT | Performed by: HOSPITALIST

## 2023-06-07 PROCEDURE — 700101 HCHG RX REV CODE 250: Performed by: HOSPITALIST

## 2023-06-07 PROCEDURE — 700102 HCHG RX REV CODE 250 W/ 637 OVERRIDE(OP): Performed by: HOSPITALIST

## 2023-06-07 PROCEDURE — A9270 NON-COVERED ITEM OR SERVICE: HCPCS | Performed by: HOSPITALIST

## 2023-06-07 RX ADMIN — HYDROMORPHONE HYDROCHLORIDE 0.25 MG: 1 INJECTION, SOLUTION INTRAMUSCULAR; INTRAVENOUS; SUBCUTANEOUS at 07:32

## 2023-06-07 RX ADMIN — HYDROMORPHONE HYDROCHLORIDE 0.25 MG: 1 INJECTION, SOLUTION INTRAMUSCULAR; INTRAVENOUS; SUBCUTANEOUS at 17:23

## 2023-06-07 RX ADMIN — PAROXETINE HYDROCHLORIDE 60 MG: 20 TABLET, FILM COATED ORAL at 17:23

## 2023-06-07 RX ADMIN — OXYCODONE HYDROCHLORIDE 10 MG: 10 TABLET ORAL at 05:52

## 2023-06-07 RX ADMIN — OXYCODONE HYDROCHLORIDE 10 MG: 10 TABLET ORAL at 11:51

## 2023-06-07 RX ADMIN — LIDOCAINE 1 PATCH: 50 PATCH CUTANEOUS at 17:24

## 2023-06-07 RX ADMIN — OXYCODONE HYDROCHLORIDE 10 MG: 10 TABLET ORAL at 21:11

## 2023-06-07 RX ADMIN — ONDANSETRON 4 MG: 2 INJECTION INTRAMUSCULAR; INTRAVENOUS at 18:24

## 2023-06-07 RX ADMIN — QUETIAPINE FUMARATE 100 MG: 100 TABLET ORAL at 17:23

## 2023-06-07 RX ADMIN — ENOXAPARIN SODIUM 40 MG: 100 INJECTION SUBCUTANEOUS at 17:24

## 2023-06-07 RX ADMIN — DOCUSATE SODIUM 50 MG AND SENNOSIDES 8.6 MG 2 TABLET: 8.6; 5 TABLET, FILM COATED ORAL at 17:23

## 2023-06-07 RX ADMIN — OXYCODONE HYDROCHLORIDE 10 MG: 10 TABLET ORAL at 00:20

## 2023-06-07 RX ADMIN — OXYCODONE HYDROCHLORIDE 10 MG: 10 TABLET ORAL at 15:57

## 2023-06-07 RX ADMIN — Medication 1 SPRAY: at 20:45

## 2023-06-07 RX ADMIN — OMEPRAZOLE 40 MG: 20 CAPSULE, DELAYED RELEASE ORAL at 20:45

## 2023-06-07 ASSESSMENT — PAIN DESCRIPTION - PAIN TYPE
TYPE: CHRONIC PAIN
TYPE: CHRONIC PAIN
TYPE: ACUTE PAIN

## 2023-06-07 NOTE — PROGRESS NOTES
Hospital Medicine Daily Progress Note    Date of Service  6/6/2023    Chief Complaint  Weakness, AMS, difficulty eating    Hospital Course  Mary Martinez is a 52 y.o. female with bipolar disorder, asthma, rheumatoid arthritis on Enbrel, chronic pain on oxycodone, history of expiratory laparotomy after duodenal perforation (2019), prior cholecystectomy, chronic abdominal pain due to SMA syndrome.  She was noted to have starvation ketosis, severe malnutrition, with oral thrush, along with HASMUKH with UTI.  She was treated with antibiotics, nystatin, along with IV fluids.  Her high anion gap metabolic acidosis and HASMUKH have resolved.  She has completed treatment for UTI.  NGT was placed and tube feeding was started.  Surgery was consulted for G-tube placement, who gave the opinion that this may be difficult due to her prior abdominal surgeries and recommended to discuss with GI for PEG placement once tube feeding is at goal (at 45cc/hr) for at least one week and no further signs of refeeding syndrome.  She was evaluated by PT/OT who recommended SNF placement. SLP recommended level 7 (easy to chew) diet with thin liquids.  Her tube feeding was eventually titrated up (at goal rate on 6/4/2023), which she continued to tolerate.    Interval Problem Update  No acute events overnight, medicine has been stable, patient is alert, awake, answering questions appropriately, she has been listening to fit.  GI following, GI plans to do a PEG tube on 6/12  -- Mag at 1.8; will privide 2 Gm of Iv mag   --- need to be oob tid with meals      Consultants/Specialty  general surgery and GI    Code Status  DNAR/DNI    Disposition  The patient is not medically cleared for discharge to home or a post-acute facility.  Anticipate discharge to: home with organized home healthcare and close outpatient follow-up    Anticipate discharge to SNF.  SNF referrals pending.  I have placed the appropriate orders for post-discharge needs.    Review of  Systems  ROS     Pertinent positives/negatives as mentioned above.     A complete review of systems was personally done by me. All other systems were negative.       Physical Exam  Temp:  [36.5 °C (97.7 °F)-37.4 °C (99.3 °F)] 37.4 °C (99.3 °F)  Pulse:  [73-90] 77  Resp:  [16-18] 18  BP: (80-97)/(50-65) 90/57  SpO2:  [95 %-96 %] 95 %    Physical Exam  Vitals reviewed.   Constitutional:       General: She is not in acute distress.     Appearance: Normal appearance. She is not ill-appearing or diaphoretic.      Comments: Cachectic. Body mass index is 16.41 kg/m².   HENT:      Head: Normocephalic and atraumatic.      Right Ear: External ear normal.      Left Ear: External ear normal.      Nose:      Comments: iris feeding tube in place     Mouth/Throat:      Mouth: Mucous membranes are moist.      Pharynx: No oropharyngeal exudate or posterior oropharyngeal erythema.   Eyes:      General: No scleral icterus.     Extraocular Movements: Extraocular movements intact.      Conjunctiva/sclera: Conjunctivae normal.      Pupils: Pupils are equal, round, and reactive to light.   Cardiovascular:      Rate and Rhythm: Normal rate and regular rhythm.      Heart sounds: Normal heart sounds. No murmur heard.  Pulmonary:      Effort: Pulmonary effort is normal. No respiratory distress.      Breath sounds: Normal breath sounds. No stridor. No wheezing, rhonchi or rales.   Chest:      Chest wall: No tenderness.   Abdominal:      General: Bowel sounds are normal. There is no distension.      Palpations: Abdomen is soft. There is no mass.      Tenderness: There is no abdominal tenderness. There is no guarding or rebound.   Musculoskeletal:         General: No swelling. Normal range of motion.      Cervical back: Normal range of motion and neck supple. No rigidity. No muscular tenderness.      Right lower leg: No edema.      Left lower leg: No edema.   Lymphadenopathy:      Cervical: No cervical adenopathy.   Skin:     General: Skin is  warm and dry.      Coloration: Skin is not jaundiced.      Findings: No rash.   Neurological:      General: No focal deficit present.      Mental Status: She is alert and oriented to person, place, and time. Mental status is at baseline.      Cranial Nerves: No cranial nerve deficit.   Psychiatric:         Mood and Affect: Mood normal.         Behavior: Behavior normal.         Thought Content: Thought content normal.         Judgment: Judgment normal.       I have performed the physical examination today 6/5/2023.  In review of yesterday's note, there are no new changes except as documented above.      Fluids    Intake/Output Summary (Last 24 hours) at 6/6/2023 1757  Last data filed at 6/6/2023 1137  Gross per 24 hour   Intake 840 ml   Output --   Net 840 ml         Laboratory        Recent Labs     06/04/23  0815 06/05/23  0627 06/06/23  0639   SODIUM 134* 135 135   POTASSIUM 4.3 4.4 4.4   CHLORIDE 105 104 105   CO2 18* 18* 20   GLUCOSE 126* 108* 109*   BUN 12 15 16   CREATININE 1.19 1.14 0.97   CALCIUM 8.0* 8.0* 8.1*                     Imaging  DX-ABDOMEN FOR TUBE PLACEMENT   Final Result      Enteric tube tip projects over the stomach.      IR-MIDLINE CATHETER INSERTION WO GUIDANCE > AGE 3   Final Result                  Ultrasound-guided midline placement performed by qualified nursing staff    as above.          DX-ABDOMEN FOR TUBE PLACEMENT   Final Result      Nasogastric tube is now satisfactorily with the stomach.      DX-ABDOMEN FOR TUBE PLACEMENT   Final Result      Nasogastric tube side-port is in the distal esophagus. Recommend advancement.             Assessment/Plan  * High anion gap metabolic acidosis- (present on admission)  Assessment & Plan  -Due to starvation ketosis  -Lactic acid negative  -Resolved  -Continue to follow. BMP in AM.        UTI (urinary tract infection)- (present on admission)  Assessment & Plan  - Completed course of antibiotics.    Thrush- (present on admission)  Assessment &  Plan  -HIV negative.  No further visible thrush.   -Continue nystatin swish and swallow.    Failure to thrive in adult- (present on admission)  Assessment & Plan  -with severe malnutrition and muscle wasting, cachexia  -Continued tube feeding per NGT.  Now at goal at 45 cc/h.  Monitor for refeeding syndrome.  Also tolerating oral diet per SLP recommendations -  level 7 (easy to chew) diet with thin liquids.  -Throat pain better with exchange of feeding tube to Iris from big bore and rigid NGT.  - Per surgery G-tube placement may be difficult due to her prior abdominal surgeries and recommended to discuss with GI for PEG placement once tube feeding is at goal (at 45cc/hr) for at least one week and no further signs of refeeding syndrome.  If unable to place PEG tube, can consider surgical J tube placement.  I again discussed this with GI, who will reevaluate, will likely plan for permanent enteric access placement on Sunday or Monday.  She will need PEG-J tube placement.  -Monitor electrolytes and renal function closely watch out for refeeding syndrome.  CMP, magnesium and phosphate in the morning.    Hypocalcemia- (present on admission)  Assessment & Plan  -Improving.    -continue replacement with tums  -Monitor level daily    Anemia- (present on admission)  Assessment & Plan  -Acute on chronic  -Currently hemodilution and frequent blood draws contributing - trying to limit blood draws.  -Continue to monitor, Hgb has been stable.    Bipolar 1 disorder (HCC)- (present on admission)  Assessment & Plan  -Continue Paxil and Seroquel    Starvation ketoacidosis- (present on admission)  Assessment & Plan  -resolved.     Severe protein-calorie malnutrition (HCC)- (present on admission)  Assessment & Plan  -She remains very high risk for refeeding syndrome, will continue to follow very closely with serial electrolytes.  -Continue tube feeds as above.     SMAS (superior mesenteric artery syndrome) c/b feeding issues (HCC)-  (present on admission)  Assessment & Plan  -Contributing to severe malnutrition, chronic abdominal pain.    Unintentional weight loss- (present on admission)  Assessment & Plan  -Related to SMA and inability to keep food down.  -HIV and lipase negative.  -Malnutrition contributing  - tube feeding    Chronic pain- (present on admission)  Assessment & Plan  -Patient on home oxycodone but narcotics may be contributing to her chronic emesis.   - Continue Paxil.  Continue oral oxycodone and IV Dilaudid for pain.    Hypophosphatemia- (present on admission)  Assessment & Plan  -Replaced.  -Resolved but high risk for fluctuation and remains high risk for refeeding syndrome  -Continue to closely follow level daily      HASMUKH (acute kidney injury) (HCC)- (present on admission)  Assessment & Plan  - Creatinine has normalized.   -Off IVF.  -Monitor for improvement in renal function.  BMP in the morning.  - avoid nephrotoxins, and continue to renally dose all medications.    Hypokalemia- (present on admission)  Assessment & Plan  - Replaced, potassium has normalized.  Magnesium has improved with replacement.    - Monitor potassium and magnesium level daily, continue to trend and replace as needed.  BMP in AM.    Rheumatoid arthritis (HCC)- (present on admission)  Assessment & Plan  -With chronic deformity and s/p amputations  -No evidence of acute flair  -Continue chronic analgesics.    Tobacco dependence- (present on admission)  Assessment & Plan  - Counseled on smoking cessation.    Pancreatitis- (present on admission)  Assessment & Plan  -H/o. Lipase negative.    Hyponatremia- (present on admission)  Assessment & Plan  -Likely due to severe dehydration.  Now resolved.  -Follow sodium levels.         VTE prophylaxis: enoxaparin ppx

## 2023-06-07 NOTE — THERAPY
Occupational Therapy Contact Note    Patient Name: Mary Martinez  Age:  53 y.o., Sex:  female  Medical Record #: [id number]  Today's Date: 6/6/2023    Attempted to see patient for OT tx session, pt states having chest pain/tightness with right arm numbness no strength changes. Alerted RN and took vitals all WFL, RN in room at end of session. Will hold and follow up as appropriate.      Arie Shahid OTD, OTR/L

## 2023-06-07 NOTE — PROGRESS NOTES
Hospital Medicine Daily Progress Note    Date of Service  6/7/2023    Chief Complaint  Weakness, AMS, difficulty eating    Hospital Course  Mary Martinez is a 52 y.o. female with bipolar disorder, asthma, rheumatoid arthritis on Enbrel, chronic pain on oxycodone, history of expiratory laparotomy after duodenal perforation (2019), prior cholecystectomy, chronic abdominal pain due to SMA syndrome.  She was noted to have starvation ketosis, severe malnutrition, with oral thrush, along with HASMUKH with UTI.  She was treated with antibiotics, nystatin, along with IV fluids.  Her high anion gap metabolic acidosis and HASMUKH have resolved.  She has completed treatment for UTI.  NGT was placed and tube feeding was started.  Surgery was consulted for G-tube placement, who gave the opinion that this may be difficult due to her prior abdominal surgeries and recommended to discuss with GI for PEG placement once tube feeding is at goal (at 45cc/hr) for at least one week and no further signs of refeeding syndrome.  She was evaluated by PT/OT who recommended SNF placement. SLP recommended level 7 (easy to chew) diet with thin liquids.  Her tube feeding was eventually titrated up (at goal rate on 6/4/2023), which she continued to tolerate.    Interval Problem Update  No acute events overnight, medicine has been stable, patient is alert, awake, answering questions appropriately, she has been listening to fit.  GI following, GI plans to do a PEG tube on 6/12  -- Mag at 1.8; will privide 2 Gm of Iv mag   --- need to be oob tid with meals    6/7:  -- No acute events overnight, medicine administered, patient is alert, awake and answering questions appropriately, she has been having tube feed, GI plans to do PEG tube on 6/12, surgery following  -- Electrolytes has been normal.   Need to be OOB 3 times daily with meals, dietary following, will follow their recommendation--    Consultants/Specialty  general surgery and GI    Code  Status  DNAR/DNI    Disposition  The patient is not medically cleared for discharge to home or a post-acute facility.  Anticipate discharge to: home with organized home healthcare and close outpatient follow-up    Anticipate discharge to SNF.  SNF referrals pending.  I have placed the appropriate orders for post-discharge needs.    Review of Systems  ROS     Pertinent positives/negatives as mentioned above.     A complete review of systems was personally done by me. All other systems were negative.       Physical Exam  Temp:  [36.4 °C (97.6 °F)-37.4 °C (99.4 °F)] 36.4 °C (97.6 °F)  Pulse:  [77-97] 88  Resp:  [17-18] 18  BP: ()/(57-63) 109/63  SpO2:  [95 %-99 %] 99 %    Physical Exam  Vitals reviewed.   Constitutional:       General: She is not in acute distress.     Appearance: Normal appearance. She is not ill-appearing or diaphoretic.      Comments: Cachectic. Body mass index is 16.41 kg/m².   HENT:      Head: Normocephalic and atraumatic.      Right Ear: External ear normal.      Left Ear: External ear normal.      Nose:      Comments: iris feeding tube in place     Mouth/Throat:      Mouth: Mucous membranes are moist.      Pharynx: No oropharyngeal exudate or posterior oropharyngeal erythema.   Eyes:      General: No scleral icterus.     Extraocular Movements: Extraocular movements intact.      Conjunctiva/sclera: Conjunctivae normal.      Pupils: Pupils are equal, round, and reactive to light.   Cardiovascular:      Rate and Rhythm: Normal rate and regular rhythm.      Heart sounds: Normal heart sounds. No murmur heard.  Pulmonary:      Effort: Pulmonary effort is normal. No respiratory distress.      Breath sounds: Normal breath sounds. No stridor. No wheezing, rhonchi or rales.   Chest:      Chest wall: No tenderness.   Abdominal:      General: Bowel sounds are normal. There is no distension.      Palpations: Abdomen is soft. There is no mass.      Tenderness: There is no abdominal tenderness. There is  no guarding or rebound.   Musculoskeletal:         General: No swelling. Normal range of motion.      Cervical back: Normal range of motion and neck supple. No rigidity. No muscular tenderness.      Right lower leg: No edema.      Left lower leg: No edema.   Lymphadenopathy:      Cervical: No cervical adenopathy.   Skin:     General: Skin is warm and dry.      Coloration: Skin is not jaundiced.      Findings: No rash.   Neurological:      General: No focal deficit present.      Mental Status: She is alert and oriented to person, place, and time. Mental status is at baseline.      Cranial Nerves: No cranial nerve deficit.   Psychiatric:         Mood and Affect: Mood normal.         Behavior: Behavior normal.         Thought Content: Thought content normal.         Judgment: Judgment normal.       I have performed the physical examination today 6/5/2023.  In review of yesterday's note, there are no new changes except as documented above.      Fluids    Intake/Output Summary (Last 24 hours) at 6/7/2023 1436  Last data filed at 6/7/2023 0900  Gross per 24 hour   Intake 540 ml   Output --   Net 540 ml         Laboratory        Recent Labs     06/05/23  0627 06/06/23  0639 06/07/23  0744   SODIUM 135 135 137   POTASSIUM 4.4 4.4 4.1   CHLORIDE 104 105 105   CO2 18* 20 20   GLUCOSE 108* 109* 125*   BUN 15 16 15   CREATININE 1.14 0.97 0.85   CALCIUM 8.0* 8.1* 8.3*                     Imaging  DX-ABDOMEN FOR TUBE PLACEMENT   Final Result      Enteric tube tip projects over the stomach.      IR-MIDLINE CATHETER INSERTION WO GUIDANCE > AGE 3   Final Result                  Ultrasound-guided midline placement performed by qualified nursing staff    as above.          DX-ABDOMEN FOR TUBE PLACEMENT   Final Result      Nasogastric tube is now satisfactorily with the stomach.      DX-ABDOMEN FOR TUBE PLACEMENT   Final Result      Nasogastric tube side-port is in the distal esophagus. Recommend advancement.              Assessment/Plan  * High anion gap metabolic acidosis- (present on admission)  Assessment & Plan  -Due to starvation ketosis  -Lactic acid negative  -Resolved  -Continue to follow. BMP in AM.        Failure to thrive in adult- (present on admission)  Assessment & Plan  -with severe malnutrition and muscle wasting, cachexia  -Continued tube feeding per NGT.  Now at goal at 45 cc/h.  Monitor for refeeding syndrome.  Also tolerating oral diet per SLP recommendations -  level 7 (easy to chew) diet with thin liquids.  -Throat pain better with exchange of feeding tube to Iris from big bore and rigid NGT.  - Per surgery G-tube placement may be difficult due to her prior abdominal surgeries and recommended to discuss with GI for PEG placement once tube feeding is at goal (at 45cc/hr) for at least one week and no further signs of refeeding syndrome.  If unable to place PEG tube, can consider surgical J tube placement.  I again discussed this with GI, who will reevaluate, will likely plan for permanent enteric access placement on Sunday or Monday.  She will need PEG-J tube placement.    Hypocalcemia- (present on admission)  Assessment & Plan  -Improving.    -continue replacement with tums  -Monitor level daily    Anemia- (present on admission)  Assessment & Plan  -Acute on chronic  -Currently hemodilution and frequent blood draws contributing - trying to limit blood draws.  -Continue to monitor, Hgb has been stable.    Bipolar 1 disorder (HCC)- (present on admission)  Assessment & Plan  -Continue Paxil and Seroquel    Starvation ketoacidosis- (present on admission)  Assessment & Plan  -resolved.     Severe protein-calorie malnutrition (HCC)- (present on admission)  Assessment & Plan  -She remains very high risk for refeeding syndrome, will continue to follow very closely with serial electrolytes.  -Continue tube feeds as above.     SMAS (superior mesenteric artery syndrome) c/b feeding issues (HCC)- (present on  admission)  Assessment & Plan  -Contributing to severe malnutrition, chronic abdominal pain.    Chronic pain- (present on admission)  Assessment & Plan  -Patient on home oxycodone but narcotics may be contributing to her chronic emesis.   - Continue Paxil.  Continue oral oxycodone and IV Dilaudid for pain.    Hypophosphatemia- (present on admission)  Assessment & Plan  -Replaced.  -Resolved but high risk for fluctuation and remains high risk for refeeding syndrome  -Continue to closely follow level daily      HASMUKH (acute kidney injury) (HCC)- (present on admission)  Assessment & Plan  - Creatinine has normalized.   - off IVF.  -Monitor for improvement in renal function.  BMP in the morning.  - avoid nephrotoxins, and continue to renally dose all medications.    Hypokalemia- (present on admission)  Assessment & Plan  - Replaced, potassium has normalized.  Magnesium has improved with replacement.    - Monitor potassium and magnesium level daily, continue to trend and replace as needed.  BMP in AM.    Rheumatoid arthritis (HCC)- (present on admission)  Assessment & Plan  -With chronic deformity and s/p amputations  -No evidence of acute flair  -Continue chronic analgesics.    Pancreatitis- (present on admission)  Assessment & Plan  -H/o. Lipase negative.    Hyponatremia- (present on admission)  Assessment & Plan  -Likely due to severe dehydration.  Now resolved.  -Follow sodium levels.    UTI (urinary tract infection)- (present on admission)  Assessment & Plan  - Completed course of antibiotics.    Thrush- (present on admission)  Assessment & Plan  -HIV negative.  No further visible thrush.   -Continue nystatin swish and swallow.    Unintentional weight loss- (present on admission)  Assessment & Plan  -Related to SMA and inability to keep food down.  -HIV and lipase negative.  -Malnutrition contributing  - tube feeding    Tobacco dependence- (present on admission)  Assessment & Plan  - Counseled on smoking  cessation.         VTE prophylaxis: enoxaparin ppx    I have performed a physical exam and reviewed and updated ROS and Plan today (6/7/2023). In review of yesterday's note (6/6/2023), there are no changes except as documented above.

## 2023-06-08 LAB
ANION GAP SERPL CALC-SCNC: 9 MMOL/L (ref 7–16)
BUN SERPL-MCNC: 15 MG/DL (ref 8–22)
CALCIUM SERPL-MCNC: 8.3 MG/DL (ref 8.5–10.5)
CHLORIDE SERPL-SCNC: 107 MMOL/L (ref 96–112)
CO2 SERPL-SCNC: 22 MMOL/L (ref 20–33)
CREAT SERPL-MCNC: 0.88 MG/DL (ref 0.5–1.4)
GFR SERPLBLD CREATININE-BSD FMLA CKD-EPI: 79 ML/MIN/1.73 M 2
GLUCOSE SERPL-MCNC: 126 MG/DL (ref 65–99)
MAGNESIUM SERPL-MCNC: 1.7 MG/DL (ref 1.5–2.5)
PHOSPHATE SERPL-MCNC: 3.8 MG/DL (ref 2.5–4.5)
POTASSIUM SERPL-SCNC: 4.7 MMOL/L (ref 3.6–5.5)
SODIUM SERPL-SCNC: 138 MMOL/L (ref 135–145)

## 2023-06-08 PROCEDURE — A9270 NON-COVERED ITEM OR SERVICE: HCPCS | Performed by: INTERNAL MEDICINE

## 2023-06-08 PROCEDURE — 770001 HCHG ROOM/CARE - MED/SURG/GYN PRIV*

## 2023-06-08 PROCEDURE — 700102 HCHG RX REV CODE 250 W/ 637 OVERRIDE(OP): Performed by: HOSPITALIST

## 2023-06-08 PROCEDURE — 99232 SBSQ HOSP IP/OBS MODERATE 35: CPT | Performed by: HOSPITALIST

## 2023-06-08 PROCEDURE — 84100 ASSAY OF PHOSPHORUS: CPT

## 2023-06-08 PROCEDURE — 700111 HCHG RX REV CODE 636 W/ 250 OVERRIDE (IP): Performed by: INTERNAL MEDICINE

## 2023-06-08 PROCEDURE — 700102 HCHG RX REV CODE 250 W/ 637 OVERRIDE(OP): Performed by: INTERNAL MEDICINE

## 2023-06-08 PROCEDURE — 97530 THERAPEUTIC ACTIVITIES: CPT | Mod: CQ

## 2023-06-08 PROCEDURE — 83735 ASSAY OF MAGNESIUM: CPT

## 2023-06-08 PROCEDURE — 700111 HCHG RX REV CODE 636 W/ 250 OVERRIDE (IP): Performed by: HOSPITALIST

## 2023-06-08 PROCEDURE — 36415 COLL VENOUS BLD VENIPUNCTURE: CPT

## 2023-06-08 PROCEDURE — A9270 NON-COVERED ITEM OR SERVICE: HCPCS | Performed by: HOSPITALIST

## 2023-06-08 PROCEDURE — 80048 BASIC METABOLIC PNL TOTAL CA: CPT

## 2023-06-08 RX ORDER — MAGNESIUM SULFATE HEPTAHYDRATE 40 MG/ML
2 INJECTION, SOLUTION INTRAVENOUS ONCE
Status: COMPLETED | OUTPATIENT
Start: 2023-06-08 | End: 2023-06-08

## 2023-06-08 RX ORDER — OMEPRAZOLE 20 MG/1
40 CAPSULE, DELAYED RELEASE ORAL NIGHTLY
Status: DISCONTINUED | OUTPATIENT
Start: 2023-06-08 | End: 2023-06-13 | Stop reason: HOSPADM

## 2023-06-08 RX ADMIN — OXYCODONE HYDROCHLORIDE 10 MG: 10 TABLET ORAL at 02:52

## 2023-06-08 RX ADMIN — OMEPRAZOLE 40 MG: 20 CAPSULE, DELAYED RELEASE ORAL at 20:02

## 2023-06-08 RX ADMIN — HYDROMORPHONE HYDROCHLORIDE 0.25 MG: 1 INJECTION, SOLUTION INTRAMUSCULAR; INTRAVENOUS; SUBCUTANEOUS at 15:46

## 2023-06-08 RX ADMIN — MAGNESIUM SULFATE HEPTAHYDRATE 2 G: 2 INJECTION, SOLUTION INTRAVENOUS at 09:52

## 2023-06-08 RX ADMIN — OXYCODONE HYDROCHLORIDE 10 MG: 10 TABLET ORAL at 14:34

## 2023-06-08 RX ADMIN — OXYCODONE HYDROCHLORIDE 10 MG: 10 TABLET ORAL at 09:46

## 2023-06-08 RX ADMIN — QUETIAPINE FUMARATE 100 MG: 100 TABLET ORAL at 17:52

## 2023-06-08 RX ADMIN — PAROXETINE HYDROCHLORIDE 60 MG: 20 TABLET, FILM COATED ORAL at 17:52

## 2023-06-08 RX ADMIN — ENOXAPARIN SODIUM 40 MG: 100 INJECTION SUBCUTANEOUS at 17:52

## 2023-06-08 RX ADMIN — ANTACID TABLETS 500 MG: 500 TABLET, CHEWABLE ORAL at 05:19

## 2023-06-08 RX ADMIN — OXYCODONE HYDROCHLORIDE 10 MG: 10 TABLET ORAL at 20:02

## 2023-06-08 RX ADMIN — Medication 100 MG: at 05:19

## 2023-06-08 RX ADMIN — HYDROMORPHONE HYDROCHLORIDE 0.25 MG: 1 INJECTION, SOLUTION INTRAMUSCULAR; INTRAVENOUS; SUBCUTANEOUS at 06:34

## 2023-06-08 ASSESSMENT — GAIT ASSESSMENTS: GAIT LEVEL OF ASSIST: UNABLE TO PARTICIPATE

## 2023-06-08 ASSESSMENT — COGNITIVE AND FUNCTIONAL STATUS - GENERAL
MOBILITY SCORE: 15
CLIMB 3 TO 5 STEPS WITH RAILING: TOTAL
MOVING TO AND FROM BED TO CHAIR: A LITTLE
WALKING IN HOSPITAL ROOM: A LOT
TURNING FROM BACK TO SIDE WHILE IN FLAT BAD: A LITTLE
SUGGESTED CMS G CODE MODIFIER MOBILITY: CK
STANDING UP FROM CHAIR USING ARMS: A LITTLE
MOVING FROM LYING ON BACK TO SITTING ON SIDE OF FLAT BED: A LITTLE

## 2023-06-08 ASSESSMENT — PAIN DESCRIPTION - PAIN TYPE
TYPE: CHRONIC PAIN
TYPE: ACUTE PAIN
TYPE: CHRONIC PAIN
TYPE: CHRONIC PAIN
TYPE: ACUTE PAIN

## 2023-06-08 NOTE — THERAPY
"Physical Therapy   Daily Treatment     Patient Name: Mary Martinez  Age:  53 y.o., Sex:  female  Medical Record #: [id number]  Today's Date: 6/8/2023     Precautions  Precautions: (P) Fall Risk;Swallow Precautions;Nasogastric Tube    Assessment    Pt anxious to get OOB and \"walk\", unfortunately the pt has not been getting up in the chair. Today the pt did not require any assistance to get seated EOB, SBA w/STS and her transfers. Unable to stand long enough w/the FWW to initiate ambulation. The pt stated she has a supportive family that has been assisting her w/mobility PTA, she was more ambulatory. The pt could go home @ w/c level, she managed transfers w/SBA. Emotional support provided, she wants to be home by Wednesday of next week for her dtrs graduation. The pt will need to be trained on feedings and will need supplies for home.   Nrsg to assist pt to the chair for all meals in prep for d/c home.   PT will cont to follow.     Plan    Treatment Plan Status: (P) Continue Current Treatment Plan  Type of Treatment: Bed Mobility, Gait Training, Neuro Re-Education / Balance, Self Care / Home Evaluation, Stair Training, Therapeutic Activities, Therapeutic Exercise  Treatment Frequency: 4 Times per Week  Treatment Duration: Until Therapy Goals Met    DC Equipment Recommendations: (P) None  Discharge Recommendations: (P) Recommend home health for continued physical therapy services    Objective       06/08/23 1239   Charge Group   PT Therapeutic Activities (Units) 2   Total Time Spent   PT Therapeutic Activities Time Spent (Mins) 25   Precautions   Precautions Fall Risk;Swallow Precautions;Nasogastric Tube   Pain 0 - 10 Group   Pain Rating Scale (NPRS) 4   Other Treatments   Other Treatments Provided Discussion w/pt about home environment PTA. Pt lives w/multiple family members and owns all necessary DME. Pt is very anxious about getting her PEG and getting out of the hospital before Wednesday for her dtrs " graduation. Emotional support provided. Met w/CM regarding conversation regarding home and feeding supplies.   Balance   Sitting Balance (Static) Fair   Sitting Balance (Dynamic) Fair   Standing Balance (Static) Poor   Standing Balance (Dynamic) Poor   Weight Shift Sitting Fair   Weight Shift Standing Fair   Comments standing w/FWW   Bed Mobility    Supine to Sit Supervised   Sit to Supine Supervised   Scooting Supervised   Comments No assist needed to get seated EOB.   Gait Analysis   Gait Level Of Assist Unable to Participate   Comments Pt unable to hold a stand long enough to initate ambulation. The pt stated she was ambulatory w/FWW @ home.   Functional Mobility   Sit to Stand Standby Assist   Bed, Chair, Wheelchair Transfer Standby Assist   Transfer Method Stand Pivot   Skilled Intervention Verbal Cuing;Postural Facilitation;Compensatory Strategies   Comments Pt does not like hands on w/STS, it makes her feel like she is going to fall. The pt managed her fucntional transfers w/SBA.   How much difficulty does the patient currently have...   Turning over in bed (including adjusting bedclothes, sheets and blankets)? 3   Sitting down on and standing up from a chair with arms (e.g., wheelchair, bedside commode, etc.) 3   Moving from lying on back to sitting on the side of the bed? 3   How much help from another person does the patient currently need...   Moving to and from a bed to a chair (including a wheelchair)? 3   Need to walk in a hospital room? 2   Climbing 3-5 steps with a railing? 1   6 clicks Mobility Score 15   Short Term Goals    Short Term Goal # 1 pt will perform supine <> sit without bed features with SPV in 6 visits to get in/out of bed at home   Goal Outcome # 1 Goal met   Short Term Goal # 2 pt will perform all functional xfrs with SPV in 6 visits for improved independence   Goal Outcome # 2 Goal not met   Short Term Goal # 3 pt will ambulate 50ft with FWW and SPV in 6 visits for improved  independence   Goal Outcome # 3 Goal not met   Short Term Goal # 4 pt will negotiate 1 step with SPV in 6 visits to access home   Goal Outcome # 4 Goal not met   Education Group   Role of Physical Therapist Patient Response Patient;Acceptance;Explanation;Action Demonstration   Physical Therapy Treatment Plan   Physical Therapy Treatment Plan Continue Current Treatment Plan   Anticipated Discharge Equipment and Recommendations   DC Equipment Recommendations None   Discharge Recommendations Recommend home health for continued physical therapy services   Interdisciplinary Plan of Care Collaboration   IDT Collaboration with  Nursing   Patient Position at End of Therapy Call Light within Reach;Tray Table within Reach;Phone within Reach   Collaboration Comments Nrsg notified of pts tx efforts   Session Information   Date / Session Number  6/8--5 (2/4, 6/9) PTA/1   Supervising Physical Therapist (PTA Treatments Only)   Supervising Physical Therapist Melany Bishop

## 2023-06-08 NOTE — PROGRESS NOTES
Assumed care of patient 0700. Received Report from [facility] nurse. Patient A&O 4, on RA, Reporting a pain level of 8/10, dilauded given at 0636, PRN Oxy available for morning med pass. Call light within reach, belongings within reach, Fall precautions in place, bed in lowest position. Patient requested ice, Patient does not have any other needs at this time.     POC was discussed with patient. Plan for G-tube placement Monday 6-12. All questions were answered. Patient verbalized understanding.

## 2023-06-08 NOTE — CARE PLAN
The patient is Stable - Low risk of patient condition declining or worsening    Shift Goals  Clinical Goals: tube feed tolerance  Patient Goals: pain control  Family Goals: NAIN    Progress made toward(s) clinical / shift goals:  Patient tolerated tube feed without any nausea or diarrhea. Pain controlled with prn q4 oxy    Patient is not progressing towards the following goals:

## 2023-06-08 NOTE — CARE PLAN
The patient is Stable - Low risk of patient condition declining or worsening    Shift Goals  Clinical Goals: Tube feed tolerance, Pain mgt  Patient Goals: Comfort, rest, pain mgt  Family Goals: N/A    Progress made toward(s) clinical / shift goals:  Pain managed with PRN oxy, skin remains clean dry and intact, patient sat up in chair for 2 hours, patient calls appropriately, remained free from falls during shift.      Problem: Pain - Standard  Goal: Alleviation of pain or a reduction in pain to the patient’s comfort goal  Outcome: Progressing     Problem: Fall Risk  Goal: Patient will remain free from falls  Outcome: Progressing     Problem: Skin Integrity  Goal: Skin integrity is maintained or improved  Outcome: Progressing       Patient is not progressing towards the following goals:

## 2023-06-09 LAB
ALBUMIN SERPL BCP-MCNC: 2.6 G/DL (ref 3.2–4.9)
ANION GAP SERPL CALC-SCNC: 12 MMOL/L (ref 7–16)
BUN SERPL-MCNC: 14 MG/DL (ref 8–22)
CALCIUM ALBUM COR SERPL-MCNC: 9.4 MG/DL (ref 8.5–10.5)
CALCIUM SERPL-MCNC: 8.3 MG/DL (ref 8.5–10.5)
CHLORIDE SERPL-SCNC: 104 MMOL/L (ref 96–112)
CO2 SERPL-SCNC: 19 MMOL/L (ref 20–33)
CREAT SERPL-MCNC: 0.88 MG/DL (ref 0.5–1.4)
ERYTHROCYTE [DISTWIDTH] IN BLOOD BY AUTOMATED COUNT: 74.9 FL (ref 35.9–50)
GFR SERPLBLD CREATININE-BSD FMLA CKD-EPI: 79 ML/MIN/1.73 M 2
GLUCOSE SERPL-MCNC: 99 MG/DL (ref 65–99)
HCT VFR BLD AUTO: 25.1 % (ref 37–47)
HGB BLD-MCNC: 7.6 G/DL (ref 12–16)
MAGNESIUM SERPL-MCNC: 1.8 MG/DL (ref 1.5–2.5)
MCH RBC QN AUTO: 29.7 PG (ref 27–33)
MCHC RBC AUTO-ENTMCNC: 30.3 G/DL (ref 32.2–35.5)
MCV RBC AUTO: 98 FL (ref 81.4–97.8)
PHOSPHATE SERPL-MCNC: 3.8 MG/DL (ref 2.5–4.5)
PLATELET # BLD AUTO: 334 K/UL (ref 164–446)
PMV BLD AUTO: 10.9 FL (ref 9–12.9)
POTASSIUM SERPL-SCNC: 4.7 MMOL/L (ref 3.6–5.5)
RBC # BLD AUTO: 2.56 M/UL (ref 4.2–5.4)
SODIUM SERPL-SCNC: 135 MMOL/L (ref 135–145)
WBC # BLD AUTO: 7.4 K/UL (ref 4.8–10.8)

## 2023-06-09 PROCEDURE — A9270 NON-COVERED ITEM OR SERVICE: HCPCS | Performed by: HOSPITALIST

## 2023-06-09 PROCEDURE — 700111 HCHG RX REV CODE 636 W/ 250 OVERRIDE (IP): Performed by: HOSPITALIST

## 2023-06-09 PROCEDURE — 770001 HCHG ROOM/CARE - MED/SURG/GYN PRIV*

## 2023-06-09 PROCEDURE — 99232 SBSQ HOSP IP/OBS MODERATE 35: CPT | Performed by: HOSPITALIST

## 2023-06-09 PROCEDURE — A9270 NON-COVERED ITEM OR SERVICE: HCPCS | Performed by: INTERNAL MEDICINE

## 2023-06-09 PROCEDURE — 700102 HCHG RX REV CODE 250 W/ 637 OVERRIDE(OP): Performed by: HOSPITALIST

## 2023-06-09 PROCEDURE — 92526 ORAL FUNCTION THERAPY: CPT

## 2023-06-09 PROCEDURE — 700111 HCHG RX REV CODE 636 W/ 250 OVERRIDE (IP): Performed by: INTERNAL MEDICINE

## 2023-06-09 PROCEDURE — 80069 RENAL FUNCTION PANEL: CPT

## 2023-06-09 PROCEDURE — 36415 COLL VENOUS BLD VENIPUNCTURE: CPT

## 2023-06-09 PROCEDURE — 83735 ASSAY OF MAGNESIUM: CPT

## 2023-06-09 PROCEDURE — 700101 HCHG RX REV CODE 250: Performed by: HOSPITALIST

## 2023-06-09 PROCEDURE — 85027 COMPLETE CBC AUTOMATED: CPT

## 2023-06-09 PROCEDURE — 700102 HCHG RX REV CODE 250 W/ 637 OVERRIDE(OP): Performed by: INTERNAL MEDICINE

## 2023-06-09 RX ORDER — MAGNESIUM SULFATE HEPTAHYDRATE 40 MG/ML
2 INJECTION, SOLUTION INTRAVENOUS ONCE
Status: COMPLETED | OUTPATIENT
Start: 2023-06-09 | End: 2023-06-09

## 2023-06-09 RX ADMIN — OMEPRAZOLE 40 MG: 20 CAPSULE, DELAYED RELEASE ORAL at 20:57

## 2023-06-09 RX ADMIN — OXYCODONE HYDROCHLORIDE 10 MG: 10 TABLET ORAL at 00:49

## 2023-06-09 RX ADMIN — LIDOCAINE 1 PATCH: 50 PATCH CUTANEOUS at 17:32

## 2023-06-09 RX ADMIN — ENOXAPARIN SODIUM 40 MG: 100 INJECTION SUBCUTANEOUS at 17:31

## 2023-06-09 RX ADMIN — HYDROMORPHONE HYDROCHLORIDE 0.25 MG: 1 INJECTION, SOLUTION INTRAMUSCULAR; INTRAVENOUS; SUBCUTANEOUS at 10:57

## 2023-06-09 RX ADMIN — OXYCODONE HYDROCHLORIDE 10 MG: 10 TABLET ORAL at 09:59

## 2023-06-09 RX ADMIN — OXYCODONE HYDROCHLORIDE 10 MG: 10 TABLET ORAL at 04:49

## 2023-06-09 RX ADMIN — HYDROMORPHONE HYDROCHLORIDE 0.25 MG: 1 INJECTION, SOLUTION INTRAMUSCULAR; INTRAVENOUS; SUBCUTANEOUS at 20:56

## 2023-06-09 RX ADMIN — PAROXETINE HYDROCHLORIDE 60 MG: 20 TABLET, FILM COATED ORAL at 17:31

## 2023-06-09 RX ADMIN — Medication 100 MG: at 04:49

## 2023-06-09 RX ADMIN — MAGNESIUM SULFATE HEPTAHYDRATE 2 G: 2 INJECTION, SOLUTION INTRAVENOUS at 14:53

## 2023-06-09 RX ADMIN — QUETIAPINE FUMARATE 100 MG: 100 TABLET ORAL at 17:31

## 2023-06-09 RX ADMIN — OXYCODONE HYDROCHLORIDE 10 MG: 10 TABLET ORAL at 14:48

## 2023-06-09 RX ADMIN — OXYCODONE HYDROCHLORIDE 10 MG: 10 TABLET ORAL at 19:28

## 2023-06-09 ASSESSMENT — PAIN DESCRIPTION - PAIN TYPE
TYPE: CHRONIC PAIN
TYPE: CHRONIC PAIN;ACUTE PAIN
TYPE: CHRONIC PAIN
TYPE: ACUTE PAIN
TYPE: CHRONIC PAIN

## 2023-06-09 ASSESSMENT — FIBROSIS 4 INDEX: FIB4 SCORE: 0.5

## 2023-06-09 NOTE — THERAPY
"Speech Language Pathology   Daily Treatment     Patient Name: Mary Martinez  AGE:  53 y.o., SEX:  female  Medical Record #: [id number]  Date of Service: 6/9/2023      Precautions:  Precautions: Fall Risk, Swallow Precautions         Subjective  \"More mashed potatoes please\"      Assessment  Pt seen on this date for dysphagia therapy. Per pt, eating more orally but preferring mashed potatoes over everything else. Plan is for PEG on 6/12. She requested double portions of mashed potatoes for all meals so this clinician contacted kitchen rep for request. No overt s/sx of aspiration noted with trials of thin liquids via cup sip and solids. Vocal quality was clear following the swallow and pt fed self without difficulty. She endorsed difficulty with mastication of hard foods at baseline but is able to eat foods such as chicken without difficulty.      Clinical Impressions  Pt continues to present with a mild baseline dysphagia given edentulism but no overt s/sx of an acute pharyngeal dysphagia. No further acute SLP needs at this time, but please re-consult with any s/sx of aspiration or difficulty.      Recommendations  D/c from acute SLP services as pt is tolerating a regular-easy to chew/thin liquid diet  Please re-consult with any s/sx of aspiration or difficulty       Anticipated Discharge Needs  Discharge Recommendations: Anticipate that the patient will have no further speech therapy needs after discharge from the hospital  Therapy Recommendations Upon DC: Not Indicated      Patient / Family Goals  Patient / Family Goal #1: \"I can swallow fine\"  Goal #1 Outcome: Goal met  Short Term Goals  Short Term Goal # 1: Patient will consume a diet with thin liquids without overt indicators of aspiration or decline in pulmonary status.  Goal Outcome # 1: Goal met      CAROLEE Alexis  "

## 2023-06-09 NOTE — CARE PLAN
The patient is Stable - Low risk of patient condition declining or worsening    Shift Goals  Clinical Goals: getting PEG placement  Patient Goals: pain control  Family Goals: N/A    Progress made toward(s) clinical / shift goals:  PEG tube planned for Monday 6/12. Pt tolerated NG continuous tube feed. Pain requested to have PRN Q4 hr oxy for chronic pain    Patient is not progressing towards the following goals:

## 2023-06-09 NOTE — PROGRESS NOTES
Hospital Medicine Daily Progress Note    Date of Service  6/9/2023    Chief Complaint  Weakness, AMS, difficulty eating    Hospital Course  Mary Martinez is a 52 y.o. female with bipolar disorder, asthma, rheumatoid arthritis on Enbrel, chronic pain on oxycodone, history of expiratory laparotomy after duodenal perforation (2019), prior cholecystectomy, chronic abdominal pain due to SMA syndrome.  She was noted to have starvation ketosis, severe malnutrition, with oral thrush, along with HASMUKH with UTI.  She was treated with antibiotics, nystatin, along with IV fluids.  Her high anion gap metabolic acidosis and HASMUKH have resolved.  She has completed treatment for UTI.  NGT was placed and tube feeding was started.  Surgery was consulted for G-tube placement, who gave the opinion that this may be difficult due to her prior abdominal surgeries and recommended to discuss with GI for PEG placement once tube feeding is at goal (at 45cc/hr) for at least one week and no further signs of refeeding syndrome.  She was evaluated by PT/OT who recommended SNF placement. SLP recommended level 7 (easy to chew) diet with thin liquids.  Her tube feeding was eventually titrated up (at goal rate on 6/4/2023), which she continued to tolerate.    Interval Problem Update  No acute events overnight, medicine has been stable, patient is alert, awake, answering questions appropriately, she has been listening to fit.  GI following, GI plans to do a PEG tube on 6/12  -- Mag at 1.8; will privide 2 Gm of Iv mag   --- need to be oob tid with meals    6/7:  -- No acute events overnight, medicine administered, patient is alert, awake and answering questions appropriately, she has been having tube feed, GI plans to do PEG tube on 6/12, surgery following  -- Electrolytes has been normal.   Need to be OOB 3 times daily with meals, dietary following, will follow their recommendation    6/8:  -- No acute events overnight, laying in a bed , denied  nay complain   Need to be oob tid   Tolerating tube feed  Gi plans to do PEG tube on Monday   Follow Gi/Surgery recs    6/9:  -- No acute events overnight, vital signs are stable, patient is alert, awake and answering questions appropriately  -Mag at 1.8, will provide 2 g of IV magnesium.  Dietary following.  Appreciate dietary recommendation, and has been ordered  -New tube feed, patient will be undergoing PEG tube placement on 6/12/2022       Consultants/Specialty  general surgery and GI    Code Status  DNAR/DNI    Disposition  The patient is not medically cleared for discharge to home or a post-acute facility.  Anticipate discharge to: home with organized home healthcare and close outpatient follow-up    Anticipate discharge to SNF.  SNF referrals pending.  I have placed the appropriate orders for post-discharge needs.    Review of Systems  ROS     Pertinent positives/negatives as mentioned above.     A complete review of systems was personally done by me. All other systems were negative.       Physical Exam  Temp:  [36.4 °C (97.5 °F)-37.1 °C (98.7 °F)] 36.4 °C (97.5 °F)  Pulse:  [79-97] 79  Resp:  [15-17] 16  BP: ()/(54-67) 98/67  SpO2:  [95 %-98 %] 96 %    Physical Exam  Vitals reviewed.   Constitutional:       General: She is not in acute distress.     Appearance: Normal appearance. She is not ill-appearing or diaphoretic.      Comments: Cachectic. Body mass index is 16.41 kg/m².   HENT:      Head: Normocephalic and atraumatic.      Right Ear: External ear normal.      Left Ear: External ear normal.      Nose:      Comments: iris feeding tube in place     Mouth/Throat:      Mouth: Mucous membranes are moist.      Pharynx: No oropharyngeal exudate or posterior oropharyngeal erythema.   Eyes:      General: No scleral icterus.     Extraocular Movements: Extraocular movements intact.      Conjunctiva/sclera: Conjunctivae normal.      Pupils: Pupils are equal, round, and reactive to light.   Cardiovascular:       Rate and Rhythm: Normal rate and regular rhythm.      Heart sounds: Normal heart sounds. No murmur heard.  Pulmonary:      Effort: Pulmonary effort is normal. No respiratory distress.      Breath sounds: Normal breath sounds. No stridor. No wheezing, rhonchi or rales.   Chest:      Chest wall: No tenderness.   Abdominal:      General: Bowel sounds are normal. There is no distension.      Palpations: Abdomen is soft. There is no mass.      Tenderness: There is no abdominal tenderness. There is no guarding or rebound.   Musculoskeletal:         General: No swelling. Normal range of motion.      Cervical back: Normal range of motion and neck supple. No rigidity. No muscular tenderness.      Right lower leg: No edema.      Left lower leg: No edema.   Lymphadenopathy:      Cervical: No cervical adenopathy.   Skin:     General: Skin is warm and dry.      Coloration: Skin is not jaundiced.      Findings: No rash.   Neurological:      General: No focal deficit present.      Mental Status: She is alert and oriented to person, place, and time. Mental status is at baseline.      Cranial Nerves: No cranial nerve deficit.   Psychiatric:         Mood and Affect: Mood normal.         Behavior: Behavior normal.         Thought Content: Thought content normal.         Judgment: Judgment normal.       I have performed the physical examination today 6/5/2023.  In review of yesterday's note, there are no new changes except as documented above.      Fluids    Intake/Output Summary (Last 24 hours) at 6/9/2023 1434  Last data filed at 6/9/2023 1000  Gross per 24 hour   Intake 980 ml   Output --   Net 980 ml         Laboratory  Recent Labs     06/09/23  0342   WBC 7.4   RBC 2.56*   HEMOGLOBIN 7.6*   HEMATOCRIT 25.1*   MCV 98.0*   MCH 29.7   MCHC 30.3*   RDW 74.9*   PLATELETCT 334   MPV 10.9       Recent Labs     06/07/23  0744 06/08/23  0507 06/09/23  0342   SODIUM 137 138 135   POTASSIUM 4.1 4.7 4.7   CHLORIDE 105 107 104   CO2 20 22  19*   GLUCOSE 125* 126* 99   BUN 15 15 14   CREATININE 0.85 0.88 0.88   CALCIUM 8.3* 8.3* 8.3*                     Imaging  DX-ABDOMEN FOR TUBE PLACEMENT   Final Result      Enteric tube tip projects over the stomach.      IR-MIDLINE CATHETER INSERTION WO GUIDANCE > AGE 3   Final Result                  Ultrasound-guided midline placement performed by qualified nursing staff    as above.          DX-ABDOMEN FOR TUBE PLACEMENT   Final Result      Nasogastric tube is now satisfactorily with the stomach.      DX-ABDOMEN FOR TUBE PLACEMENT   Final Result      Nasogastric tube side-port is in the distal esophagus. Recommend advancement.             Assessment/Plan  * High anion gap metabolic acidosis- (present on admission)  Assessment & Plan  -Due to starvation ketosis  -Lactic acid negative  -Resolved  -Continue to follow. BMP in AM.        Failure to thrive in adult- (present on admission)  Assessment & Plan  -with severe malnutrition and muscle wasting, cachexia  -Continued tube feeding per NGT.  Now at goal at 45 cc/h.  Monitor for refeeding syndrome.  Also tolerating oral diet per SLP recommendations -  level 7 (easy to chew) diet with thin liquids.  -Throat pain better with exchange of feeding tube to Iris from big bore and rigid NGT.  - Per surgery G-tube placement may be difficult due to her prior abdominal surgeries and recommended to discuss with GI for PEG placement once tube feeding is at goal (at 45cc/hr) for at least one week and no further signs of refeeding syndrome.  If unable to place PEG tube, can consider surgical J tube placement.  I again discussed this with GI, who will reevaluate, will likely plan for permanent enteric access placement on Sunday or Monday.  She will need PEG-J tube placement.    Hypocalcemia- (present on admission)  Assessment & Plan  -Improving.    -continue replacement with tums  -Monitor level daily    Anemia- (present on admission)  Assessment & Plan  -Acute on  chronic  -Currently hemodilution and frequent blood draws contributing - trying to limit blood draws.  -Continue to monitor, Hgb has been stable.    Bipolar 1 disorder ([facility])- (present on admission)  Assessment & Plan  -Continue Paxil and Seroquel    Starvation ketoacidosis- (present on admission)  Assessment & Plan  -resolved.     Severe protein-calorie malnutrition ([facility])- (present on admission)  Assessment & Plan  -She remains very high risk for refeeding syndrome, will continue to follow very closely with serial electrolytes.  -Continue tube feeds as above.     SMAS (superior mesenteric artery syndrome) c/b feeding issues ([facility])- (present on admission)  Assessment & Plan  -Contributing to severe malnutrition, chronic abdominal pain.    Chronic pain- (present on admission)  Assessment & Plan  -Patient on home oxycodone but narcotics may be contributing to her chronic emesis.   - Continue Paxil.  Continue oral oxycodone and IV Dilaudid for pain.    Hypophosphatemia- (present on admission)  Assessment & Plan  -Replaced.  -Resolved but high risk for fluctuation and remains high risk for refeeding syndrome  -Continue to closely follow level daily      HASMUKH (acute kidney injury) ([facility])- (present on admission)  Assessment & Plan  - Creatinine has normalized.   - off IVF.  -Monitor for improvement in renal function.  BMP in the morning.  - avoid nephrotoxins, and continue to renally dose all medications.    Hypokalemia- (present on admission)  Assessment & Plan  - Replaced, potassium has normalized.  Magnesium has improved with replacement.    - Monitor potassium and magnesium level daily, continue to trend and replace as needed.  BMP in AM.    Rheumatoid arthritis ([facility])- (present on admission)  Assessment & Plan  -With chronic deformity and s/p amputations  -No evidence of acute flair  -Continue chronic analgesics.    Pancreatitis- (present on admission)  Assessment & Plan  -H/o. Lipase negative.    Hyponatremia- (present  on admission)  Assessment & Plan  -Likely due to severe dehydration.  Now resolved.  -Follow sodium levels.    UTI (urinary tract infection)- (present on admission)  Assessment & Plan  - Completed course of antibiotics.    Thrush- (present on admission)  Assessment & Plan  -HIV negative.  No further visible thrush.   -Continue nystatin swish and swallow.    Unintentional weight loss- (present on admission)  Assessment & Plan  -Related to SMA and inability to keep food down.  -HIV and lipase negative.  -Malnutrition contributing  - tube feeding    Tobacco dependence- (present on admission)  Assessment & Plan  - Counseled on smoking cessation.         VTE prophylaxis: enoxaparin ppx    I have performed a physical exam and reviewed and updated ROS and Plan today (6/9/2023). In review of yesterday's note (6/8/2023), there are no changes except as documented above.

## 2023-06-09 NOTE — PROGRESS NOTES
Hospital Medicine Daily Progress Note    Date of Service  6/8/2023    Chief Complaint  Weakness, AMS, difficulty eating    Hospital Course  Mary Martinez is a 52 y.o. female with bipolar disorder, asthma, rheumatoid arthritis on Enbrel, chronic pain on oxycodone, history of expiratory laparotomy after duodenal perforation (2019), prior cholecystectomy, chronic abdominal pain due to SMA syndrome.  She was noted to have starvation ketosis, severe malnutrition, with oral thrush, along with HASMUKH with UTI.  She was treated with antibiotics, nystatin, along with IV fluids.  Her high anion gap metabolic acidosis and HASMUKH have resolved.  She has completed treatment for UTI.  NGT was placed and tube feeding was started.  Surgery was consulted for G-tube placement, who gave the opinion that this may be difficult due to her prior abdominal surgeries and recommended to discuss with GI for PEG placement once tube feeding is at goal (at 45cc/hr) for at least one week and no further signs of refeeding syndrome.  She was evaluated by PT/OT who recommended SNF placement. SLP recommended level 7 (easy to chew) diet with thin liquids.  Her tube feeding was eventually titrated up (at goal rate on 6/4/2023), which she continued to tolerate.    Interval Problem Update  No acute events overnight, medicine has been stable, patient is alert, awake, answering questions appropriately, she has been listening to fit.  GI following, GI plans to do a PEG tube on 6/12  -- Mag at 1.8; will privide 2 Gm of Iv mag   --- need to be oob tid with meals    6/7:  -- No acute events overnight, medicine administered, patient is alert, awake and answering questions appropriately, she has been having tube feed, GI plans to do PEG tube on 6/12, surgery following  -- Electrolytes has been normal.   Need to be OOB 3 times daily with meals, dietary following, will follow their recommendation    6/8:  -- No acute events overnight, laying in a bed , denied  nay complain   Need to be oob tid   Tolerating tube feed  Gi plans to do PEG tube on Monday   Follow Gi/Surgery recs    Consultants/Specialty  general surgery and GI    Code Status  DNAR/DNI    Disposition  The patient is not medically cleared for discharge to home or a post-acute facility.  Anticipate discharge to: home with organized home healthcare and close outpatient follow-up    Anticipate discharge to SNF.  SNF referrals pending.  I have placed the appropriate orders for post-discharge needs.    Review of Systems  ROS     Pertinent positives/negatives as mentioned above.     A complete review of systems was personally done by me. All other systems were negative.       Physical Exam  Temp:  [36.2 °C (97.1 °F)-37.1 °C (98.7 °F)] 37.1 °C (98.7 °F)  Pulse:  [77-93] 86  Resp:  [15-17] 17  BP: ()/(55-70) 95/55  SpO2:  [95 %-98 %] 95 %    Physical Exam  Vitals reviewed.   Constitutional:       General: She is not in acute distress.     Appearance: Normal appearance. She is not ill-appearing or diaphoretic.      Comments: Cachectic. Body mass index is 16.41 kg/m².   HENT:      Head: Normocephalic and atraumatic.      Right Ear: External ear normal.      Left Ear: External ear normal.      Nose:      Comments: iris feeding tube in place     Mouth/Throat:      Mouth: Mucous membranes are moist.      Pharynx: No oropharyngeal exudate or posterior oropharyngeal erythema.   Eyes:      General: No scleral icterus.     Extraocular Movements: Extraocular movements intact.      Conjunctiva/sclera: Conjunctivae normal.      Pupils: Pupils are equal, round, and reactive to light.   Cardiovascular:      Rate and Rhythm: Normal rate and regular rhythm.      Heart sounds: Normal heart sounds. No murmur heard.  Pulmonary:      Effort: Pulmonary effort is normal. No respiratory distress.      Breath sounds: Normal breath sounds. No stridor. No wheezing, rhonchi or rales.   Chest:      Chest wall: No tenderness.   Abdominal:       General: Bowel sounds are normal. There is no distension.      Palpations: Abdomen is soft. There is no mass.      Tenderness: There is no abdominal tenderness. There is no guarding or rebound.   Musculoskeletal:         General: No swelling. Normal range of motion.      Cervical back: Normal range of motion and neck supple. No rigidity. No muscular tenderness.      Right lower leg: No edema.      Left lower leg: No edema.   Lymphadenopathy:      Cervical: No cervical adenopathy.   Skin:     General: Skin is warm and dry.      Coloration: Skin is not jaundiced.      Findings: No rash.   Neurological:      General: No focal deficit present.      Mental Status: She is alert and oriented to person, place, and time. Mental status is at baseline.      Cranial Nerves: No cranial nerve deficit.   Psychiatric:         Mood and Affect: Mood normal.         Behavior: Behavior normal.         Thought Content: Thought content normal.         Judgment: Judgment normal.       I have performed the physical examination today 6/5/2023.  In review of yesterday's note, there are no new changes except as documented above.      Fluids    Intake/Output Summary (Last 24 hours) at 6/8/2023 1727  Last data filed at 6/8/2023 1400  Gross per 24 hour   Intake 980 ml   Output --   Net 980 ml         Laboratory        Recent Labs     06/06/23  0639 06/07/23  0744 06/08/23  0507   SODIUM 135 137 138   POTASSIUM 4.4 4.1 4.7   CHLORIDE 105 105 107   CO2 20 20 22   GLUCOSE 109* 125* 126*   BUN 16 15 15   CREATININE 0.97 0.85 0.88   CALCIUM 8.1* 8.3* 8.3*                     Imaging  DX-ABDOMEN FOR TUBE PLACEMENT   Final Result      Enteric tube tip projects over the stomach.      IR-MIDLINE CATHETER INSERTION WO GUIDANCE > AGE 3   Final Result                  Ultrasound-guided midline placement performed by qualified nursing staff    as above.          DX-ABDOMEN FOR TUBE PLACEMENT   Final Result      Nasogastric tube is now satisfactorily with  the stomach.      DX-ABDOMEN FOR TUBE PLACEMENT   Final Result      Nasogastric tube side-port is in the distal esophagus. Recommend advancement.             Assessment/Plan  * High anion gap metabolic acidosis- (present on admission)  Assessment & Plan  -Due to starvation ketosis  -Lactic acid negative  -Resolved  -Continue to follow. BMP in AM.        Failure to thrive in adult- (present on admission)  Assessment & Plan  -with severe malnutrition and muscle wasting, cachexia  -Continued tube feeding per NGT.  Now at goal at 45 cc/h.  Monitor for refeeding syndrome.  Also tolerating oral diet per SLP recommendations -  level 7 (easy to chew) diet with thin liquids.  -Throat pain better with exchange of feeding tube to Iris from big bore and rigid NGT.  - Per surgery G-tube placement may be difficult due to her prior abdominal surgeries and recommended to discuss with GI for PEG placement once tube feeding is at goal (at 45cc/hr) for at least one week and no further signs of refeeding syndrome.  If unable to place PEG tube, can consider surgical J tube placement.  I again discussed this with GI, who will reevaluate, will likely plan for permanent enteric access placement on Sunday or Monday.  She will need PEG-J tube placement.    Hypocalcemia- (present on admission)  Assessment & Plan  -Improving.    -continue replacement with tums  -Monitor level daily    Anemia- (present on admission)  Assessment & Plan  -Acute on chronic  -Currently hemodilution and frequent blood draws contributing - trying to limit blood draws.  -Continue to monitor, Hgb has been stable.    Bipolar 1 disorder (HCC)- (present on admission)  Assessment & Plan  -Continue Paxil and Seroquel    Starvation ketoacidosis- (present on admission)  Assessment & Plan  -resolved.     Severe protein-calorie malnutrition (HCC)- (present on admission)  Assessment & Plan  -She remains very high risk for refeeding syndrome, will continue to follow very closely  with serial electrolytes.  -Continue tube feeds as above.     SMAS (superior mesenteric artery syndrome) c/b feeding issues ([facility])- (present on admission)  Assessment & Plan  -Contributing to severe malnutrition, chronic abdominal pain.    Chronic pain- (present on admission)  Assessment & Plan  -Patient on home oxycodone but narcotics may be contributing to her chronic emesis.   - Continue Paxil.  Continue oral oxycodone and IV Dilaudid for pain.    Hypophosphatemia- (present on admission)  Assessment & Plan  -Replaced.  -Resolved but high risk for fluctuation and remains high risk for refeeding syndrome  -Continue to closely follow level daily      HASMUKH (acute kidney injury) ([facility])- (present on admission)  Assessment & Plan  - Creatinine has normalized.   - off IVF.  -Monitor for improvement in renal function.  BMP in the morning.  - avoid nephrotoxins, and continue to renally dose all medications.    Hypokalemia- (present on admission)  Assessment & Plan  - Replaced, potassium has normalized.  Magnesium has improved with replacement.    - Monitor potassium and magnesium level daily, continue to trend and replace as needed.  BMP in AM.    Rheumatoid arthritis (HCC)- (present on admission)  Assessment & Plan  -With chronic deformity and s/p amputations  -No evidence of acute flair  -Continue chronic analgesics.    Pancreatitis- (present on admission)  Assessment & Plan  -H/o. Lipase negative.    Hyponatremia- (present on admission)  Assessment & Plan  -Likely due to severe dehydration.  Now resolved.  -Follow sodium levels.    UTI (urinary tract infection)- (present on admission)  Assessment & Plan  - Completed course of antibiotics.    Thrush- (present on admission)  Assessment & Plan  -HIV negative.  No further visible thrush.   -Continue nystatin swish and swallow.    Unintentional weight loss- (present on admission)  Assessment & Plan  -Related to SMA and inability to keep food down.  -HIV and lipase  negative.  -Malnutrition contributing  - tube feeding    Tobacco dependence- (present on admission)  Assessment & Plan  - Counseled on smoking cessation.         VTE prophylaxis: enoxaparin ppx    I have performed a physical exam and reviewed and updated ROS and Plan today (6/8/2023). In review of yesterday's note (6/7/2023), there are no changes except as documented above.

## 2023-06-09 NOTE — FACE TO FACE
Face to Face Supporting Documentation - Home Health    The encounter with this patient was in whole or in part the primary reason for home health admission.    Date of encounter:   Patient:                    MRN:                       YOB: 2021  Mary Martinez  [id number]  1970     Home health to see patient for:  Skilled Nursing care for assessment, interventions & education, Home health aide, Physical Therapy evaluation and treatment, and Occupational therapy evaluation and treatment    Skilled need for:  Medication Management management of tube feed    Skilled nursing interventions to include:  Comment: management of tube feed    Homebound status evidenced by:  Needs the assistance of another person in order to leave the home. Leaving home requires a considerable and taxing effort. There is a normal inability to leave the home.    Community Physician to provide follow up care: Fidencio Christopher D.O.     Optional Interventions? No      I certify the face to face encounter for this home health care referral meets the CMS requirements and the encounter/clinical assessment with the patient was, in whole, or in part, for the medical condition(s) listed above, which is the primary reason for home health care. Based on my clinical findings: the service(s) are medically necessary, support the need for home health care, and the homebound criteria are met.  I certify that this patient has had a face to face encounter by myself.  Carina Chen M.D. - NPI: [id number]

## 2023-06-09 NOTE — DIETARY
"Nutrition support weekly update:  Day 15 of admit.  Mary Martinez is a 53 y.o. female with admitting DX of high anion gap metabolic acidosis.    Tube feeding initiated on 5/26. Current TF via NG is Fibersource with a goal rate of 45 ml/hr which will provide 1296 kcals, 58 grams protein, 874 mL free water.     Assessment:  Weight 41 kg (90 lbs)  Re-estimate of nutritional needs not indicated at this time.      Evaluation:   Plans for G-tube placement on Monday. Pt tolerating continuous feeds at this time.   Pt on Level 7 - easy-to-chew; Level 0 - thin liquids texture modified diet. Intake fluctuating from <% of meals eaten.   Pt with goal to dc by Wednesday for daughters graduation.   Labs: Co2 19, Ca 8.3. No signs of refeeding.   Meds: tums, zofran, bowel regimen, vit b1  +BM 6/8  Current feeding of Fibersource remains appropriate. Once cleared for use of PEG, recommendations below.     Malnutrition risk: Per 5/26 RD assessment, \"Pt meets criteria for severe chronic malnutrition related to N/V in setting of SMA syndrome exacerbated by thrush AEB severe fat wasting, severe muscle wasting, and reported PO intake <50% of nutrition needs for >1 month per pt report.\"    Recommendations/Plan:  Continue TF formula and rate.   Once PEG cleared for use switch to bolus feeding. Give ½ container of Boost Plus via PEG at 1st feeding. Advance each feeding ½ container until goal is reached.  Boost Plus with a goal of 4 cartons per day (1 at meal times + 1 at HS) will provide 1440 kcal, 56 grams protein and 739 ml free water per day.  Fluid per MD.      RD following.   "

## 2023-06-10 ENCOUNTER — APPOINTMENT (OUTPATIENT)
Dept: RADIOLOGY | Facility: MEDICAL CENTER | Age: 53
DRG: 682 | End: 2023-06-10
Attending: HOSPITALIST
Payer: MEDICAID

## 2023-06-10 LAB
ANION GAP SERPL CALC-SCNC: 12 MMOL/L (ref 7–16)
BUN SERPL-MCNC: 13 MG/DL (ref 8–22)
CALCIUM SERPL-MCNC: 8.7 MG/DL (ref 8.5–10.5)
CHLORIDE SERPL-SCNC: 105 MMOL/L (ref 96–112)
CO2 SERPL-SCNC: 20 MMOL/L (ref 20–33)
CREAT SERPL-MCNC: 0.77 MG/DL (ref 0.5–1.4)
GFR SERPLBLD CREATININE-BSD FMLA CKD-EPI: 93 ML/MIN/1.73 M 2
GLUCOSE SERPL-MCNC: 127 MG/DL (ref 65–99)
MAGNESIUM SERPL-MCNC: 2 MG/DL (ref 1.5–2.5)
PHOSPHATE SERPL-MCNC: 3.9 MG/DL (ref 2.5–4.5)
POTASSIUM SERPL-SCNC: 4.5 MMOL/L (ref 3.6–5.5)
SODIUM SERPL-SCNC: 137 MMOL/L (ref 135–145)

## 2023-06-10 PROCEDURE — 99232 SBSQ HOSP IP/OBS MODERATE 35: CPT | Performed by: HOSPITALIST

## 2023-06-10 PROCEDURE — 700111 HCHG RX REV CODE 636 W/ 250 OVERRIDE (IP): Performed by: INTERNAL MEDICINE

## 2023-06-10 PROCEDURE — 700102 HCHG RX REV CODE 250 W/ 637 OVERRIDE(OP): Performed by: HOSPITALIST

## 2023-06-10 PROCEDURE — 84100 ASSAY OF PHOSPHORUS: CPT

## 2023-06-10 PROCEDURE — 770001 HCHG ROOM/CARE - MED/SURG/GYN PRIV*

## 2023-06-10 PROCEDURE — A9270 NON-COVERED ITEM OR SERVICE: HCPCS | Performed by: INTERNAL MEDICINE

## 2023-06-10 PROCEDURE — 83735 ASSAY OF MAGNESIUM: CPT

## 2023-06-10 PROCEDURE — 700105 HCHG RX REV CODE 258

## 2023-06-10 PROCEDURE — 36415 COLL VENOUS BLD VENIPUNCTURE: CPT

## 2023-06-10 PROCEDURE — A9270 NON-COVERED ITEM OR SERVICE: HCPCS | Performed by: HOSPITALIST

## 2023-06-10 PROCEDURE — 80048 BASIC METABOLIC PNL TOTAL CA: CPT

## 2023-06-10 PROCEDURE — 74018 RADEX ABDOMEN 1 VIEW: CPT

## 2023-06-10 PROCEDURE — 700102 HCHG RX REV CODE 250 W/ 637 OVERRIDE(OP): Performed by: INTERNAL MEDICINE

## 2023-06-10 PROCEDURE — 700111 HCHG RX REV CODE 636 W/ 250 OVERRIDE (IP): Performed by: HOSPITALIST

## 2023-06-10 RX ORDER — BISACODYL 10 MG
10 SUPPOSITORY, RECTAL RECTAL
Status: DISCONTINUED | OUTPATIENT
Start: 2023-06-10 | End: 2023-06-13 | Stop reason: HOSPADM

## 2023-06-10 RX ORDER — ONDANSETRON 4 MG/1
4 TABLET, ORALLY DISINTEGRATING ORAL EVERY 4 HOURS PRN
Status: DISCONTINUED | OUTPATIENT
Start: 2023-06-10 | End: 2023-06-13 | Stop reason: HOSPADM

## 2023-06-10 RX ORDER — PAROXETINE HYDROCHLORIDE 20 MG/1
60 TABLET, FILM COATED ORAL EVERY EVENING
Status: DISCONTINUED | OUTPATIENT
Start: 2023-06-10 | End: 2023-06-13 | Stop reason: HOSPADM

## 2023-06-10 RX ORDER — HYDROMORPHONE HYDROCHLORIDE 1 MG/ML
0.25 INJECTION, SOLUTION INTRAMUSCULAR; INTRAVENOUS; SUBCUTANEOUS EVERY 8 HOURS PRN
Status: DISCONTINUED | OUTPATIENT
Start: 2023-06-10 | End: 2023-06-13

## 2023-06-10 RX ORDER — AMOXICILLIN 250 MG
2 CAPSULE ORAL 2 TIMES DAILY
Status: DISCONTINUED | OUTPATIENT
Start: 2023-06-10 | End: 2023-06-13 | Stop reason: HOSPADM

## 2023-06-10 RX ORDER — SODIUM CHLORIDE 9 MG/ML
500 INJECTION, SOLUTION INTRAVENOUS ONCE
Status: COMPLETED | OUTPATIENT
Start: 2023-06-10 | End: 2023-06-10

## 2023-06-10 RX ORDER — PROMETHAZINE HYDROCHLORIDE 25 MG/1
12.5-25 TABLET ORAL EVERY 4 HOURS PRN
Status: DISCONTINUED | OUTPATIENT
Start: 2023-06-10 | End: 2023-06-13 | Stop reason: HOSPADM

## 2023-06-10 RX ORDER — HYDROMORPHONE HYDROCHLORIDE 2 MG/1
1 TABLET ORAL EVERY 4 HOURS PRN
Status: DISCONTINUED | OUTPATIENT
Start: 2023-06-10 | End: 2023-06-13

## 2023-06-10 RX ORDER — POLYETHYLENE GLYCOL 3350 17 G/17G
1 POWDER, FOR SOLUTION ORAL
Status: DISCONTINUED | OUTPATIENT
Start: 2023-06-10 | End: 2023-06-13 | Stop reason: HOSPADM

## 2023-06-10 RX ORDER — QUETIAPINE FUMARATE 100 MG/1
100 TABLET, FILM COATED ORAL EVERY EVENING
Status: DISCONTINUED | OUTPATIENT
Start: 2023-06-10 | End: 2023-06-13 | Stop reason: HOSPADM

## 2023-06-10 RX ORDER — GAUZE BANDAGE 2" X 2"
100 BANDAGE TOPICAL DAILY
Status: DISCONTINUED | OUTPATIENT
Start: 2023-06-11 | End: 2023-06-13 | Stop reason: HOSPADM

## 2023-06-10 RX ORDER — CALCIUM CARBONATE 500 MG/1
500 TABLET, CHEWABLE ORAL 3 TIMES DAILY
Status: DISCONTINUED | OUTPATIENT
Start: 2023-06-10 | End: 2023-06-13 | Stop reason: HOSPADM

## 2023-06-10 RX ORDER — ACETAMINOPHEN 325 MG/1
650 TABLET ORAL EVERY 6 HOURS PRN
Status: DISCONTINUED | OUTPATIENT
Start: 2023-06-10 | End: 2023-06-13 | Stop reason: HOSPADM

## 2023-06-10 RX ORDER — OXYCODONE HYDROCHLORIDE 10 MG/1
10 TABLET ORAL EVERY 4 HOURS PRN
Status: DISCONTINUED | OUTPATIENT
Start: 2023-06-10 | End: 2023-06-13 | Stop reason: HOSPADM

## 2023-06-10 RX ADMIN — PAROXETINE HYDROCHLORIDE 60 MG: 20 TABLET, FILM COATED ORAL at 18:00

## 2023-06-10 RX ADMIN — ENOXAPARIN SODIUM 40 MG: 100 INJECTION SUBCUTANEOUS at 18:00

## 2023-06-10 RX ADMIN — OMEPRAZOLE 40 MG: 20 CAPSULE, DELAYED RELEASE ORAL at 20:29

## 2023-06-10 RX ADMIN — OXYCODONE HYDROCHLORIDE 10 MG: 10 TABLET ORAL at 23:15

## 2023-06-10 RX ADMIN — HYDROMORPHONE HYDROCHLORIDE 1 MG: 2 TABLET ORAL at 14:18

## 2023-06-10 RX ADMIN — OXYCODONE HYDROCHLORIDE 10 MG: 10 TABLET ORAL at 16:04

## 2023-06-10 RX ADMIN — OXYCODONE HYDROCHLORIDE 10 MG: 10 TABLET ORAL at 03:58

## 2023-06-10 RX ADMIN — SODIUM CHLORIDE 500 ML: 9 INJECTION, SOLUTION INTRAVENOUS at 21:42

## 2023-06-10 RX ADMIN — HYDROMORPHONE HYDROCHLORIDE 0.25 MG: 1 INJECTION, SOLUTION INTRAMUSCULAR; INTRAVENOUS; SUBCUTANEOUS at 04:57

## 2023-06-10 RX ADMIN — HYDROMORPHONE HYDROCHLORIDE 1 MG: 2 TABLET ORAL at 17:59

## 2023-06-10 RX ADMIN — OXYCODONE HYDROCHLORIDE 10 MG: 10 TABLET ORAL at 08:23

## 2023-06-10 RX ADMIN — QUETIAPINE FUMARATE 100 MG: 100 TABLET ORAL at 18:00

## 2023-06-10 RX ADMIN — Medication 100 MG: at 03:58

## 2023-06-10 RX ADMIN — OXYCODONE HYDROCHLORIDE 10 MG: 10 TABLET ORAL at 12:02

## 2023-06-10 RX ADMIN — HYDROMORPHONE HYDROCHLORIDE 0.25 MG: 1 INJECTION, SOLUTION INTRAMUSCULAR; INTRAVENOUS; SUBCUTANEOUS at 09:54

## 2023-06-10 ASSESSMENT — PAIN DESCRIPTION - PAIN TYPE
TYPE: ACUTE PAIN
TYPE: CHRONIC PAIN
TYPE: ACUTE PAIN
TYPE: ACUTE PAIN
TYPE: CHRONIC PAIN
TYPE: ACUTE PAIN

## 2023-06-10 ASSESSMENT — FIBROSIS 4 INDEX: FIB4 SCORE: 0.5

## 2023-06-10 NOTE — CARE PLAN
The patient is Stable - Low risk of patient condition declining or worsening    Shift Goals  Clinical Goals: pain management; safety; NG feeds;  Patient Goals: pain management, comfort  Family Goals: N/A    Progress made toward(s) clinical / shift goals:  yes      Problem: Pain - Standard  Goal: Alleviation of pain or a reduction in pain to the patient’s comfort goal  Outcome: Progressing     Problem: Knowledge Deficit - Standard  Goal: Patient and family/care givers will demonstrate understanding of plan of care, disease process/condition, diagnostic tests and medications  Outcome: Progressing     Problem: Fall Risk  Goal: Patient will remain free from falls  Outcome: Progressing     Problem: Skin Integrity  Goal: Skin integrity is maintained or improved  Outcome: Progressing

## 2023-06-10 NOTE — PROGRESS NOTES
Hospital Medicine Daily Progress Note    Date of Service  6/9/2023    Chief Complaint  Weakness, AMS, difficulty eating    Hospital Course  Mary Martinez is a 52 y.o. female with bipolar disorder, asthma, rheumatoid arthritis on Enbrel, chronic pain on oxycodone, history of expiratory laparotomy after duodenal perforation (2019), prior cholecystectomy, chronic abdominal pain due to SMA syndrome.  She was noted to have starvation ketosis, severe malnutrition, with oral thrush, along with HASMUKH with UTI.  She was treated with antibiotics, nystatin, along with IV fluids.  Her high anion gap metabolic acidosis and HASMUKH have resolved.  She has completed treatment for UTI.  NGT was placed and tube feeding was started.  Surgery was consulted for G-tube placement, who gave the opinion that this may be difficult due to her prior abdominal surgeries and recommended to discuss with GI for PEG placement once tube feeding is at goal (at 45cc/hr) for at least one week and no further signs of refeeding syndrome.  She was evaluated by PT/OT who recommended SNF placement. SLP recommended level 7 (easy to chew) diet with thin liquids.  Her tube feeding was eventually titrated up (at goal rate on 6/4/2023), which she continued to tolerate.    Interval Problem Update  No acute events overnight, medicine has been stable, patient is alert, awake, answering questions appropriately, she has been listening to fit.  GI following, GI plans to do a PEG tube on 6/12  -- Mag at 1.8; will privide 2 Gm of Iv mag   --- need to be oob tid with meals    6/7:  -- No acute events overnight, medicine administered, patient is alert, awake and answering questions appropriately, she has been having tube feed, GI plans to do PEG tube on 6/12, surgery following  -- Electrolytes has been normal.   Need to be OOB 3 times daily with meals, dietary following, will follow their recommendation    6/8:  -- No acute events overnight, laying in a bed , denied  nay complain   Need to be oob tid   Tolerating tube feed  Gi plans to do PEG tube on Monday   Follow Gi/Surgery recs    6/9:  -- No acute events overnight, medicine administered, patient is alert, awake, answering questions appropriately, patient has been receiving tube feed, discussed with dietary in regards to tube feed.  Dietary provided the recommendation in the chart.  -- Patient will have a PEG tube placed Monday, and will make n.p.o. on Sunday night.  Reviewed CBC, BMP--    Consultants/Specialty  general surgery and GI    Code Status  DNAR/DNI    Disposition  The patient is not medically cleared for discharge to home or a post-acute facility.  Anticipate discharge to: home with organized home healthcare and close outpatient follow-up    Anticipate discharge to SNF.  SNF referrals pending.  I have placed the appropriate orders for post-discharge needs.    Review of Systems  ROS     Pertinent positives/negatives as mentioned above.     A complete review of systems was personally done by me. All other systems were negative.       Physical Exam  Temp:  [36.5 °C (97.7 °F)-36.9 °C (98.4 °F)] 36.5 °C (97.7 °F)  Pulse:  [76-86] 76  Resp:  [17-18] 18  BP: ()/(58-64) 91/58  SpO2:  [93 %-100 %] 97 %    Physical Exam  Vitals reviewed.   Constitutional:       General: She is not in acute distress.     Appearance: Normal appearance. She is not ill-appearing or diaphoretic.      Comments: Cachectic. Body mass index is 16.41 kg/m².   HENT:      Head: Normocephalic and atraumatic.      Right Ear: External ear normal.      Left Ear: External ear normal.      Nose:      Comments: iris feeding tube in place     Mouth/Throat:      Mouth: Mucous membranes are moist.      Pharynx: No oropharyngeal exudate or posterior oropharyngeal erythema.   Eyes:      General: No scleral icterus.     Extraocular Movements: Extraocular movements intact.      Conjunctiva/sclera: Conjunctivae normal.      Pupils: Pupils are equal, round, and  reactive to light.   Cardiovascular:      Rate and Rhythm: Normal rate and regular rhythm.      Heart sounds: Normal heart sounds. No murmur heard.  Pulmonary:      Effort: Pulmonary effort is normal. No respiratory distress.      Breath sounds: Normal breath sounds. No stridor. No wheezing, rhonchi or rales.   Chest:      Chest wall: No tenderness.   Abdominal:      General: Bowel sounds are normal. There is no distension.      Palpations: Abdomen is soft. There is no mass.      Tenderness: There is no abdominal tenderness. There is no guarding or rebound.   Musculoskeletal:         General: No swelling. Normal range of motion.      Cervical back: Normal range of motion and neck supple. No rigidity. No muscular tenderness.      Right lower leg: No edema.      Left lower leg: No edema.   Lymphadenopathy:      Cervical: No cervical adenopathy.   Skin:     General: Skin is warm and dry.      Coloration: Skin is not jaundiced.      Findings: No rash.   Neurological:      General: No focal deficit present.      Mental Status: She is alert and oriented to person, place, and time. Mental status is at baseline.      Cranial Nerves: No cranial nerve deficit.   Psychiatric:         Mood and Affect: Mood normal.         Behavior: Behavior normal.         Thought Content: Thought content normal.         Judgment: Judgment normal.       I have performed the physical examination today 6/5/2023.  In review of yesterday's note, there are no new changes except as documented above.      Fluids    Intake/Output Summary (Last 24 hours) at 6/10/2023 1500  Last data filed at 6/10/2023 1300  Gross per 24 hour   Intake 790 ml   Output --   Net 790 ml         Laboratory  Recent Labs     06/09/23  0342   WBC 7.4   RBC 2.56*   HEMOGLOBIN 7.6*   HEMATOCRIT 25.1*   MCV 98.0*   MCH 29.7   MCHC 30.3*   RDW 74.9*   PLATELETCT 334   MPV 10.9       Recent Labs     06/08/23  0507 06/09/23  0342 06/10/23  0933   SODIUM 138 135 137   POTASSIUM 4.7 4.7  4.5   CHLORIDE 107 104 105   CO2 22 19* 20   GLUCOSE 126* 99 127*   BUN 15 14 13   CREATININE 0.88 0.88 0.77   CALCIUM 8.3* 8.3* 8.7                     Imaging  IR-US GUIDED PIV   Final Result    Ultrasound-guided PERIPHERAL IV INSERTION performed by    qualified nursing staff as above.      [id number] VIEW   Final Result   Addendum (preliminary) 1 of 1   There is a feeding tube present with the tip overlying the gastric fundus.      Final      1.  No evidence of bowel obstruction.      2.  Constipation.      3.  Possible left nephrolith.      DX-ABDOMEN FOR TUBE PLACEMENT   Final Result      Enteric tube tip projects over the stomach.      IR-MIDLINE CATHETER INSERTION WO GUIDANCE > AGE 3   Final Result                  Ultrasound-guided midline placement performed by qualified nursing staff    as above.          DX-ABDOMEN FOR TUBE PLACEMENT   Final Result      Nasogastric tube is now satisfactorily with the stomach.      DX-ABDOMEN FOR TUBE PLACEMENT   Final Result      Nasogastric tube side-port is in the distal esophagus. Recommend advancement.             Assessment/Plan  * High anion gap metabolic acidosis- (present on admission)  Assessment & Plan  -Due to starvation ketosis  -Lactic acid negative  -Resolved          Failure to thrive in adult- (present on admission)  Assessment & Plan  -with severe malnutrition and muscle wasting, cachexia  -Continued tube feeding per NGT.  Now at goal at 45 cc/h.  Monitor for refeeding syndrome.  Also tolerating oral diet per SLP recommendations -  level 7 (easy to chew) diet with thin liquids.  -Throat pain better with exchange of feeding tube to Iris from big bore and rigid NGT.  - Per surgery G-tube placement may be difficult due to her prior abdominal surgeries and recommended to discuss with GI for PEG placement once tube feeding is at goal (at 45cc/hr) for at least one week and no further signs of refeeding syndrome.  If unable to place PEG tube, can consider  surgical J tube placement.  I again discussed this with GI, who will reevaluate, will likely plan for permanent enteric access placement on Sunday or Monday.  She will need PEG-J tube placement.      Will make NPO tomorrow night     Hypocalcemia- (present on admission)  Assessment & Plan  -Improving.    -continue replacement with tums  -Monitor level daily    Anemia- (present on admission)  Assessment & Plan  -Acute on chronic  -Currently hemodilution and frequent blood draws contributing - trying to limit blood draws.  -Continue to monitor, Hgb has been stable.    Bipolar 1 disorder (HCC)- (present on admission)  Assessment & Plan  -Continue Paxil and Seroquel    Starvation ketoacidosis- (present on admission)  Assessment & Plan  -resolved.     Severe protein-calorie malnutrition (HCC)- (present on admission)  Assessment & Plan  -She remains very high risk for refeeding syndrome, will continue to follow very closely with serial electrolytes.  -Continue tube feeds as above.     SMAS (superior mesenteric artery syndrome) c/b feeding issues ([facility])- (present on admission)  Assessment & Plan  -Contributing to severe malnutrition, chronic abdominal pain.    Chronic pain- (present on admission)  Assessment & Plan  -Patient on home oxycodone but narcotics may be contributing to her chronic emesis.   - Continue Paxil.  Continue oral oxycodone and IV Dilaudid for pain.    Hypophosphatemia- (present on admission)  Assessment & Plan  -Replaced.  -Resolved but high risk for fluctuation and remains high risk for refeeding syndrome  -Continue to closely follow level daily      HASMUKH (acute kidney injury) ([facility])- (present on admission)  Assessment & Plan  - Creatinine has normalized.   - off IVF.  -Monitor for improvement in renal function.  BMP in the morning.  - avoid nephrotoxins, and continue to renally dose all medications.    Hypokalemia- (present on admission)  Assessment & Plan  Replete as needed    Rheumatoid arthritis ([facility])-  (present on admission)  Assessment & Plan  -With chronic deformity and s/p amputations  -No evidence of acute flair  -Continue chronic analgesics.    Pancreatitis- (present on admission)  Assessment & Plan  -H/o. Lipase negative.    Hyponatremia- (present on admission)  Assessment & Plan  -Likely due to severe dehydration.  Now resolved.  -Follow sodium levels.    UTI (urinary tract infection)- (present on admission)  Assessment & Plan  - Completed course of antibiotics.    Thrush- (present on admission)  Assessment & Plan  -HIV negative.  No further visible thrush.   -Continue nystatin swish and swallow.    Unintentional weight loss- (present on admission)  Assessment & Plan  -Related to SMA and inability to keep food down.  -HIV and lipase negative.  -Malnutrition contributing  - tube feeding    Tobacco dependence- (present on admission)  Assessment & Plan  - Counseled on smoking cessation.         VTE prophylaxis: enoxaparin ppx

## 2023-06-10 NOTE — CARE PLAN
The patient is Watcher - Medium risk of patient condition declining or worsening    Shift Goals  Clinical Goals: Pt. will state improved pain level by end of shift  Patient Goals: paint control, rest  Family Goals: NAIN    Progress made toward(s) clinical / shift goals:  Patient refusing adjustment of NG at this time- provider aware. Pain well controlled with dilaudid and oxy. Up to chair x3. Wctm.     Patient is not progressing towards the following goals:

## 2023-06-10 NOTE — PROGRESS NOTES
Upon start of shift, coretrack clogged - attempted to be fixed by noc shift without success. Tube feeding off since 0730 - KUB ordered to confirm if placement is correct. Dr. Gustavo jovel.

## 2023-06-10 NOTE — CARE PLAN
The patient is Stable - Low risk of patient condition declining or worsening    Shift Goals  Clinical Goals: pain management; safety; NG feeds;  Patient Goals: pain management, comfort  Family Goals: N/A    Progress made toward(s) clinical / shift goals:  Patient stated that pain was manageable with prn pain meds. She was able to sleep through the night with comfort. Pt received continous tube feed    Patient is not progressing towards the following goals:

## 2023-06-10 NOTE — PROGRESS NOTES
Hospital Medicine Daily Progress Note    Date of Service  6/10/2023    Chief Complaint  Weakness, AMS, difficulty eating    Hospital Course  Mary Martinez is a 52 y.o. female with bipolar disorder, asthma, rheumatoid arthritis on Enbrel, chronic pain on oxycodone, history of expiratory laparotomy after duodenal perforation (2019), prior cholecystectomy, chronic abdominal pain due to SMA syndrome.  She was noted to have starvation ketosis, severe malnutrition, with oral thrush, along with HASMUKH with UTI.  She was treated with antibiotics, nystatin, along with IV fluids.  Her high anion gap metabolic acidosis and HASMUKH have resolved.  She has completed treatment for UTI.  NGT was placed and tube feeding was started.  Surgery was consulted for G-tube placement, who gave the opinion that this may be difficult due to her prior abdominal surgeries and recommended to discuss with GI for PEG placement once tube feeding is at goal (at 45cc/hr) for at least one week and no further signs of refeeding syndrome.  She was evaluated by PT/OT who recommended SNF placement. SLP recommended level 7 (easy to chew) diet with thin liquids.  Her tube feeding was eventually titrated up (at goal rate on 6/4/2023), which she continued to tolerate.    Interval Problem Update  No acute events overnight, medicine has been stable, patient is alert, awake, answering questions appropriately, she has been listening to fit.  GI following, GI plans to do a PEG tube on 6/12  -- Mag at 1.8; will privide 2 Gm of Iv mag   --- need to be oob tid with meals    6/7:  -- No acute events overnight, medicine administered, patient is alert, awake and answering questions appropriately, she has been having tube feed, GI plans to do PEG tube on 6/12, surgery following  -- Electrolytes has been normal.   Need to be OOB 3 times daily with meals, dietary following, will follow their recommendation    6/8:  -- No acute events overnight, laying in a bed , denied  "nay complain   Need to be oob tid   Tolerating tube feed  Gi plans to do PEG tube on    6/9:  -- No acute events overnight, medicine administered, patient is alert, awake, answering questions appropriately, patient has been receiving tube feed, discussed with dietary in regards to tube feed.  Dietary provided the recommendation in the chart.  -- Patient will have a PEG tube placed Monday, and will make n.p.o. on Sunday night.  Reviewed CBC, BMP--    6/10:  -- No acute events overnight, vital sign has been stable, patient is alert, awake, answering questions appropriately.  Patient is cachectic.  She has been receiving tube feed along with regular diet.  We will see how much she tolerates.  -- Nursing staff notified that patient culture \"clogged IJV was obtained, noted to have a correct placement.  Patient will be going for PEG tube placement on Monday       Discussed the patient with nursing staff, case management    Consultants/Specialty  general surgery and GI    Code Status  DNAR/DNI    Disposition  The patient is not medically cleared for discharge to home or a post-acute facility.  Anticipate discharge to: home with organized home healthcare and close outpatient follow-up    Anticipate discharge to SNF.  SNF referrals pending.  I have placed the appropriate orders for post-discharge needs.    Review of Systems  ROS     Pertinent positives/negatives as mentioned above.     A complete review of systems was personally done by me. All other systems were negative.       Physical Exam  Temp:  [36.5 °C (97.7 °F)-36.9 °C (98.4 °F)] 36.5 °C (97.7 °F)  Pulse:  [76-86] 76  Resp:  [17-18] 18  BP: ()/(58-64) 91/58  SpO2:  [93 %-100 %] 97 %    Physical Exam  Vitals reviewed.   Constitutional:       General: She is not in acute distress.     Appearance: Normal appearance. She is not ill-appearing or diaphoretic.      Comments: Cachectic. Body mass index is 16.41 kg/m².   HENT:      Head: Normocephalic and atraumatic.    "   Right Ear: External ear normal.      Left Ear: External ear normal.      Nose:      Comments: iris feeding tube in place     Mouth/Throat:      Mouth: Mucous membranes are moist.      Pharynx: No oropharyngeal exudate or posterior oropharyngeal erythema.   Eyes:      General: No scleral icterus.     Extraocular Movements: Extraocular movements intact.      Conjunctiva/sclera: Conjunctivae normal.      Pupils: Pupils are equal, round, and reactive to light.   Cardiovascular:      Rate and Rhythm: Normal rate and regular rhythm.      Heart sounds: Normal heart sounds. No murmur heard.  Pulmonary:      Effort: Pulmonary effort is normal. No respiratory distress.      Breath sounds: Normal breath sounds. No stridor. No wheezing, rhonchi or rales.   Chest:      Chest wall: No tenderness.   Abdominal:      General: Bowel sounds are normal. There is no distension.      Palpations: Abdomen is soft. There is no mass.      Tenderness: There is no abdominal tenderness. There is no guarding or rebound.   Musculoskeletal:         General: No swelling. Normal range of motion.      Cervical back: Normal range of motion and neck supple. No rigidity. No muscular tenderness.      Right lower leg: No edema.      Left lower leg: No edema.   Lymphadenopathy:      Cervical: No cervical adenopathy.   Skin:     General: Skin is warm and dry.      Coloration: Skin is not jaundiced.      Findings: No rash.   Neurological:      General: No focal deficit present.      Mental Status: She is alert and oriented to person, place, and time. Mental status is at baseline.      Cranial Nerves: No cranial nerve deficit.   Psychiatric:         Mood and Affect: Mood normal.         Behavior: Behavior normal.         Thought Content: Thought content normal.         Judgment: Judgment normal.       I have performed the physical examination today 6/5/2023.  In review of yesterday's note, there are no new changes except as documented  above.      Fluids    Intake/Output Summary (Last 24 hours) at 6/10/2023 1457  Last data filed at 6/10/2023 1300  Gross per 24 hour   Intake 790 ml   Output --   Net 790 ml         Laboratory  Recent Labs     06/09/23  0342   WBC 7.4   RBC 2.56*   HEMOGLOBIN 7.6*   HEMATOCRIT 25.1*   MCV 98.0*   MCH 29.7   MCHC 30.3*   RDW 74.9*   PLATELETCT 334   MPV 10.9       Recent Labs     06/08/23  0507 06/09/23  0342 06/10/23  0933   SODIUM 138 135 137   POTASSIUM 4.7 4.7 4.5   CHLORIDE 107 104 105   CO2 22 19* 20   GLUCOSE 126* 99 127*   BUN 15 14 13   CREATININE 0.88 0.88 0.77   CALCIUM 8.3* 8.3* 8.7                     Imaging  IR-US GUIDED PIV   Final Result    Ultrasound-guided PERIPHERAL IV INSERTION performed by    qualified nursing staff as above.      [id number] VIEW   Final Result   Addendum (preliminary) 1 of 1   There is a feeding tube present with the tip overlying the gastric fundus.      Final      1.  No evidence of bowel obstruction.      2.  Constipation.      3.  Possible left nephrolith.      DX-ABDOMEN FOR TUBE PLACEMENT   Final Result      Enteric tube tip projects over the stomach.      IR-MIDLINE CATHETER INSERTION WO GUIDANCE > AGE 3   Final Result                  Ultrasound-guided midline placement performed by qualified nursing staff    as above.          DX-ABDOMEN FOR TUBE PLACEMENT   Final Result      Nasogastric tube is now satisfactorily with the stomach.      DX-ABDOMEN FOR TUBE PLACEMENT   Final Result      Nasogastric tube side-port is in the distal esophagus. Recommend advancement.             Assessment/Plan  * High anion gap metabolic acidosis- (present on admission)  Assessment & Plan  -Due to starvation ketosis  -Lactic acid negative  -Resolved          Failure to thrive in adult- (present on admission)  Assessment & Plan  -with severe malnutrition and muscle wasting, cachexia  -Continued tube feeding per NGT.  Now at goal at 45 cc/h.  Monitor for refeeding syndrome.  Also tolerating  oral diet per SLP recommendations -  level 7 (easy to chew) diet with thin liquids.  -Throat pain better with exchange of feeding tube to Iris from big bore and rigid NGT.  - Per surgery G-tube placement may be difficult due to her prior abdominal surgeries and recommended to discuss with GI for PEG placement once tube feeding is at goal (at 45cc/hr) for at least one week and no further signs of refeeding syndrome.  If unable to place PEG tube, can consider surgical J tube placement.  I again discussed this with GI, who will reevaluate, will likely plan for permanent enteric access placement on Sunday or Monday.  She will need PEG-J tube placement.      Will make NPO tomorrow night     Hypocalcemia- (present on admission)  Assessment & Plan  -Improving.    -continue replacement with tums  -Monitor level daily    Anemia- (present on admission)  Assessment & Plan  -Acute on chronic  -Currently hemodilution and frequent blood draws contributing - trying to limit blood draws.  -Continue to monitor, Hgb has been stable.    Bipolar 1 disorder (HCC)- (present on admission)  Assessment & Plan  -Continue Paxil and Seroquel    Starvation ketoacidosis- (present on admission)  Assessment & Plan  -resolved.     Severe protein-calorie malnutrition (HCC)- (present on admission)  Assessment & Plan  -She remains very high risk for refeeding syndrome, will continue to follow very closely with serial electrolytes.  -Continue tube feeds as above.     SMAS (superior mesenteric artery syndrome) c/b feeding issues (HCC)- (present on admission)  Assessment & Plan  -Contributing to severe malnutrition, chronic abdominal pain.    Chronic pain- (present on admission)  Assessment & Plan  -Patient on home oxycodone but narcotics may be contributing to her chronic emesis.   - Continue Paxil.  Continue oral oxycodone and IV Dilaudid for pain.    Hypophosphatemia- (present on admission)  Assessment & Plan  -Replaced.  -Resolved but high risk for  fluctuation and remains high risk for refeeding syndrome  -Continue to closely follow level daily      HASMUKH (acute kidney injury) (HCC)- (present on admission)  Assessment & Plan  - Creatinine has normalized.   - off IVF.  -Monitor for improvement in renal function.  BMP in the morning.  - avoid nephrotoxins, and continue to renally dose all medications.    Hypokalemia- (present on admission)  Assessment & Plan  Replete as needed    Rheumatoid arthritis (HCC)- (present on admission)  Assessment & Plan  -With chronic deformity and s/p amputations  -No evidence of acute flair  -Continue chronic analgesics.    Pancreatitis- (present on admission)  Assessment & Plan  -H/o. Lipase negative.    Hyponatremia- (present on admission)  Assessment & Plan  -Likely due to severe dehydration.  Now resolved.  -Follow sodium levels.    UTI (urinary tract infection)- (present on admission)  Assessment & Plan  - Completed course of antibiotics.    Thrush- (present on admission)  Assessment & Plan  -HIV negative.  No further visible thrush.   -Continue nystatin swish and swallow.    Unintentional weight loss- (present on admission)  Assessment & Plan  -Related to SMA and inability to keep food down.  -HIV and lipase negative.  -Malnutrition contributing  - tube feeding    Tobacco dependence- (present on admission)  Assessment & Plan  - Counseled on smoking cessation.         VTE prophylaxis: enoxaparin ppx    I have performed a physical exam and reviewed and updated ROS and Plan today (6/10/2023). In review of yesterday's note (6/9/2023), there are no changes except as documented above.

## 2023-06-11 LAB
ALBUMIN SERPL BCP-MCNC: 2.8 G/DL (ref 3.2–4.9)
ANION GAP SERPL CALC-SCNC: 13 MMOL/L (ref 7–16)
BUN SERPL-MCNC: 10 MG/DL (ref 8–22)
BUN SERPL-MCNC: 11 MG/DL (ref 8–22)
CALCIUM ALBUM COR SERPL-MCNC: 9.3 MG/DL (ref 8.5–10.5)
CALCIUM SERPL-MCNC: 8.3 MG/DL (ref 8.5–10.5)
CALCIUM SERPL-MCNC: 8.3 MG/DL (ref 8.5–10.5)
CHLORIDE SERPL-SCNC: 106 MMOL/L (ref 96–112)
CHLORIDE SERPL-SCNC: 106 MMOL/L (ref 96–112)
CO2 SERPL-SCNC: 19 MMOL/L (ref 20–33)
CO2 SERPL-SCNC: 21 MMOL/L (ref 20–33)
CREAT SERPL-MCNC: 0.75 MG/DL (ref 0.5–1.4)
CREAT SERPL-MCNC: 0.81 MG/DL (ref 0.5–1.4)
GFR SERPLBLD CREATININE-BSD FMLA CKD-EPI: 88 ML/MIN/1.73 M 2
GFR SERPLBLD CREATININE-BSD FMLA CKD-EPI: 96 ML/MIN/1.73 M 2
GLUCOSE SERPL-MCNC: 89 MG/DL (ref 65–99)
GLUCOSE SERPL-MCNC: 94 MG/DL (ref 65–99)
MAGNESIUM SERPL-MCNC: 1.6 MG/DL (ref 1.5–2.5)
PHOSPHATE SERPL-MCNC: 4 MG/DL (ref 2.5–4.5)
PHOSPHATE SERPL-MCNC: 4.4 MG/DL (ref 2.5–4.5)
POTASSIUM SERPL-SCNC: 4.3 MMOL/L (ref 3.6–5.5)
POTASSIUM SERPL-SCNC: 4.4 MMOL/L (ref 3.6–5.5)
SODIUM SERPL-SCNC: 137 MMOL/L (ref 135–145)
SODIUM SERPL-SCNC: 138 MMOL/L (ref 135–145)

## 2023-06-11 PROCEDURE — 80069 RENAL FUNCTION PANEL: CPT

## 2023-06-11 PROCEDURE — 84100 ASSAY OF PHOSPHORUS: CPT

## 2023-06-11 PROCEDURE — 700102 HCHG RX REV CODE 250 W/ 637 OVERRIDE(OP): Performed by: HOSPITALIST

## 2023-06-11 PROCEDURE — 36415 COLL VENOUS BLD VENIPUNCTURE: CPT

## 2023-06-11 PROCEDURE — A9270 NON-COVERED ITEM OR SERVICE: HCPCS | Performed by: HOSPITALIST

## 2023-06-11 PROCEDURE — 99232 SBSQ HOSP IP/OBS MODERATE 35: CPT | Performed by: HOSPITALIST

## 2023-06-11 PROCEDURE — 99232 SBSQ HOSP IP/OBS MODERATE 35: CPT | Performed by: NURSE PRACTITIONER

## 2023-06-11 PROCEDURE — 700111 HCHG RX REV CODE 636 W/ 250 OVERRIDE (IP): Performed by: HOSPITALIST

## 2023-06-11 PROCEDURE — 770001 HCHG ROOM/CARE - MED/SURG/GYN PRIV*

## 2023-06-11 PROCEDURE — 700111 HCHG RX REV CODE 636 W/ 250 OVERRIDE (IP): Performed by: INTERNAL MEDICINE

## 2023-06-11 PROCEDURE — 80048 BASIC METABOLIC PNL TOTAL CA: CPT

## 2023-06-11 PROCEDURE — 83735 ASSAY OF MAGNESIUM: CPT

## 2023-06-11 RX ORDER — MAGNESIUM SULFATE HEPTAHYDRATE 40 MG/ML
4 INJECTION, SOLUTION INTRAVENOUS ONCE
Status: COMPLETED | OUTPATIENT
Start: 2023-06-11 | End: 2023-06-11

## 2023-06-11 RX ADMIN — PAROXETINE HYDROCHLORIDE 60 MG: 20 TABLET, FILM COATED ORAL at 16:22

## 2023-06-11 RX ADMIN — HYDROMORPHONE HYDROCHLORIDE 1 MG: 2 TABLET ORAL at 11:27

## 2023-06-11 RX ADMIN — OMEPRAZOLE 40 MG: 20 CAPSULE, DELAYED RELEASE ORAL at 19:58

## 2023-06-11 RX ADMIN — HYDROMORPHONE HYDROCHLORIDE 1 MG: 2 TABLET ORAL at 05:19

## 2023-06-11 RX ADMIN — OXYCODONE HYDROCHLORIDE 10 MG: 10 TABLET ORAL at 19:54

## 2023-06-11 RX ADMIN — Medication 100 MG: at 05:20

## 2023-06-11 RX ADMIN — QUETIAPINE FUMARATE 100 MG: 100 TABLET ORAL at 16:22

## 2023-06-11 RX ADMIN — OXYCODONE HYDROCHLORIDE 10 MG: 10 TABLET ORAL at 14:46

## 2023-06-11 RX ADMIN — ENOXAPARIN SODIUM 40 MG: 100 INJECTION SUBCUTANEOUS at 16:22

## 2023-06-11 RX ADMIN — OXYCODONE HYDROCHLORIDE 10 MG: 10 TABLET ORAL at 09:20

## 2023-06-11 RX ADMIN — MAGNESIUM SULFATE IN WATER 4 G: 40 INJECTION, SOLUTION INTRAVENOUS at 11:34

## 2023-06-11 ASSESSMENT — ENCOUNTER SYMPTOMS
ABDOMINAL PAIN: 0
HEMOPTYSIS: 0
NERVOUS/ANXIOUS: 1
NERVOUS/ANXIOUS: 0
FEVER: 0
FALLS: 0
DEPRESSION: 0
HEARTBURN: 0
NAUSEA: 0
CONSTIPATION: 0
CHILLS: 0
DIARRHEA: 0
DOUBLE VISION: 0
BLURRED VISION: 0
WEAKNESS: 0
VOMITING: 0
BLOOD IN STOOL: 0
MYALGIAS: 1
COUGH: 0
DIZZINESS: 0
SHORTNESS OF BREATH: 0
FLANK PAIN: 0
SORE THROAT: 0
BACK PAIN: 0

## 2023-06-11 ASSESSMENT — PATIENT HEALTH QUESTIONNAIRE - PHQ9
2. FEELING DOWN, DEPRESSED, IRRITABLE, OR HOPELESS: NOT AT ALL
SUM OF ALL RESPONSES TO PHQ9 QUESTIONS 1 AND 2: 0
1. LITTLE INTEREST OR PLEASURE IN DOING THINGS: NOT AT ALL

## 2023-06-11 ASSESSMENT — PAIN DESCRIPTION - PAIN TYPE
TYPE: ACUTE PAIN

## 2023-06-11 NOTE — PROGRESS NOTES
Pt is A&Ox 4, RA. All medications taken and tolerated, medicated for pain per MAR. Alerted Hospitalist Grace regarding low pt BP at 83/53. 500 ml bolus administered, BP 90/66 resulted. Pt resting in bed w/ call light and belongings in reach, bed locked in lowest position, hourly rounding in place. No additional needs at this time.

## 2023-06-11 NOTE — PROGRESS NOTES
..Gastroenterology Progress Note               Author:  BIANCA Peterson Date & Time Created: 6/11/2023 9:10 AM       Patient ID:  Name:             Mary Martinez  YOB: 1970  Age:                 53 y.o.  female  MRN:               [id number]        Medical Decision Making, by Problem:  Active Hospital Problems    Diagnosis     Hypocalcemia [E83.51]     Failure to thrive in adult [R62.7]     Thrush [B37.0]     UTI (urinary tract infection) [N39.0]     Anemia [D64.9]     Bipolar 1 disorder (HCC) [F31.9]     Starvation ketoacidosis [T73.0XXA, E87.29]     Severe protein-calorie malnutrition (HCC) [E43]     SMAS (superior mesenteric artery syndrome) c/b feeding issues (HCC) [K55.1]     Unintentional weight loss [R63.4]     Chronic pain [G89.29]     Hypophosphatemia [E83.39]     High anion gap metabolic acidosis [E87.29]     HASMUKH (acute kidney injury) (HCC) [N17.9]     Hypokalemia [E87.6]     Rheumatoid arthritis (HCC) [M06.9]     Tobacco dependence [F17.200]     Pancreatitis [K85.90]     Hyponatremia [E87.1]            Presenting Chief Complaint:  Consideration for PEG-J tube    Interval History:  Patient is a 53-year-old female with past medical history significant for chronic abdominal pain, failure to thrive, inability to take oral intake secondary to nausea and vomiting who requires a feeding tube for nutrition. Surgery recommended PEG-J.     6/5/2023:  Patient seen and examined  Complaining of dry mouth and pain secondary to iris feeding tube  Urinating and stooling  Electrolytes stable    6/11/2023: patient seen at bedside. NGT in place. But per nursing, NGT clogged and patient has been taking magic cups for nutrition. When NGT was functioning, patient was taking goal feeds of 45ml/hr. Electrolytes stable, no signs of refeeding syndrome. Discussed PEG-J tube with patient and she is agreeable to proceed.         Hospital Medications:  Current Facility-Administered Medications    Medication Dose Frequency Provider Last Rate Last Admin    acetaminophen (Tylenol) tablet 650 mg  650 mg Q6HRS PRN Carina Chen M.D.        PARoxetine (PAXIL) tablet 60 mg  60 mg Q EVENING KRYSTLE Angeles.DDee   60 mg at 06/10/23 1800    senna-docusate (PERICOLACE or SENOKOT S) 8.6-50 MG per tablet 2 Tablet  2 Tablet BID Carina Chen M.D.        And    polyethylene glycol/lytes (MIRALAX) PACKET 1 Packet  1 Packet QDAY PRN Carina Chen M.D.        And    magnesium hydroxide (MILK OF MAGNESIA) suspension 30 mL  30 mL QDAY PRN Carina Chen M.D.        And    bisacodyl (DULCOLAX) suppository 10 mg  10 mg QDAY PRN Carina Chen M.D.        promethazine (PHENERGAN) tablet 12.5-25 mg  12.5-25 mg Q4HRS PRN Carina Chen M.D.        QUEtiapine (Seroquel) tablet 100 mg  100 mg Q EVENING KRYSTLE Angeles.D.   100 mg at 06/10/23 1800    oxyCODONE immediate release (ROXICODONE) tablet 10 mg  10 mg Q4HRS PRN Carina Chen M.D.   10 mg at 06/10/23 2315    Or    HYDROmorphone (Dilaudid) injection 0.25 mg  0.25 mg Q8HRS PRN KRYSTLE Angeles.DDee   0.25 mg at 06/10/23 0954    ondansetron (ZOFRAN ODT) dispertab 4 mg  4 mg Q4HRS PRN Carina Chen M.D.        thiamine (Vitamin B-1) tablet 100 mg  100 mg DAILY Carina Chen M.D.   100 mg at 06/11/23 0520    calcium carbonate (Tums) chewable tab 500 mg  500 mg TID Carina Chen M.D.        HYDROmorphone (DILAUDID) tablet 1 mg  1 mg Q4HRS PRN Carina Chen M.D.   1 mg at 06/11/23 0519    omeprazole (PRILOSEC) capsule 40 mg  40 mg Nightly Carina Chen M.D.   40 mg at 06/10/23 2029    lidocaine (LIDODERM) 5 % 1 Patch  1 Patch Q24HR Carina Chen M.D.   1 Patch at 06/09/23 1732    lidocaine (XYLOCAINE) 2 % viscous solution 15 mL  15 mL Q3HRS PRN Ba Persaud M.D.   15 mL at 06/03/23 1609    sore throat spray (Chloraseptic) 1 Spray  1 [place] Q2HRS PRN Krysta Arana M.D.   1 Spray at 06/07/23 2045    Pharmacy Consult: Enteral tube insertion - review meds/change route/product  "selection   PHARMACY TO DOSE Disha Segal M.D.        enoxaparin (Lovenox) inj 40 mg  40 mg DAILY AT 1800 Eloise Chin D.O.   40 mg at 06/10/23 1800    Pharmacy Consult Request ...Pain Management Review 1 Each  1 Each PHARMACY TO DOSE Eloise Chin D.O.        ondansetron (ZOFRAN) syringe/vial injection 4 mg  4 mg Q4HRS PRN DEEJAY Brown.O.   4 mg at 06/07/23 1824    promethazine (PHENERGAN) suppository 12.5-25 mg  12.5-25 mg Q4HRS PRN DEEJAY Brown.O.        prochlorperazine (COMPAZINE) injection 5-10 mg  5-10 mg Q4HRS PRN DEEJAY Brown.O.   10 mg at 06/02/23 2212    labetalol (NORMODYNE/TRANDATE) injection 10 mg  10 mg Q4HRS PRN DEEJAY Brown.O.       Last reviewed on 5/25/2023  1:45 PM by Aida House AMELIE       Review of Systems:  Review of Systems   Constitutional:  Positive for malaise/fatigue. Negative for chills and fever.   HENT:  Negative for sore throat.         Dry mouth   Eyes:  Negative for blurred vision.   Respiratory:  Negative for cough and shortness of breath.    Cardiovascular:  Negative for chest pain and leg swelling.   Gastrointestinal:  Negative for abdominal pain, blood in stool, constipation, diarrhea, heartburn, melena, nausea and vomiting.   Genitourinary:  Negative for dysuria and flank pain.   Musculoskeletal:  Negative for back pain and falls.   Skin:  Negative for rash.   Neurological:  Negative for dizziness and weakness.   Psychiatric/Behavioral:  Negative for depression. The patient is not nervous/anxious.    All other systems reviewed and are negative.        Vital signs:  Weight/BMI: Body mass index is 15.12 kg/m².  BP 96/62   Pulse 71   Temp 36.7 °C (98.1 °F) (Temporal)   Resp 18   Ht 1.6 m (5' 2.99\")   Wt 38.7 kg (85 lb 5.1 oz)   SpO2 99%   Vitals:    06/11/23 0500 06/11/23 0600 06/11/23 0800 06/11/23 0822   BP:   96/62    Pulse:   71    Resp: 18 16 18 18   Temp:   36.7 °C (98.1 °F)    TempSrc:   Temporal    SpO2:   99%    Weight:     "   Height:         Oxygen Therapy:  Pulse Oximetry: 99 %, O2 (LPM): 0, O2 Delivery Device: None - Room Air    Intake/Output Summary (Last 24 hours) at 6/11/2023 0910  Last data filed at 6/10/2023 1800  Gross per 24 hour   Intake 700 ml   Output --   Net 700 ml           Physical Exam:  Physical Exam  Vitals and nursing note reviewed.   Constitutional:       General: She is not in acute distress.     Comments: Frail appearing and cachectic, appears older than stated age   HENT:      Head: Normocephalic and atraumatic.      Nose: Nose normal.      Comments: NGT secured in place     Mouth/Throat:      Mouth: Mucous membranes are moist.      Pharynx: Oropharynx is clear.   Eyes:      General: No scleral icterus.     Extraocular Movements: Extraocular movements intact.      Conjunctiva/sclera: Conjunctivae normal.   Cardiovascular:      Rate and Rhythm: Normal rate and regular rhythm.      Pulses: Normal pulses.      Heart sounds: Normal heart sounds.   Pulmonary:      Effort: Pulmonary effort is normal. No respiratory distress.      Breath sounds: Normal breath sounds.   Abdominal:      General: Abdomen is flat. Bowel sounds are normal. There is no distension.      Palpations: Abdomen is soft.      Tenderness: There is no abdominal tenderness.   Musculoskeletal:         General: Normal range of motion.      Cervical back: Normal range of motion.      Right lower leg: No edema.      Left lower leg: No edema.   Skin:     General: Skin is warm and dry.      Capillary Refill: Capillary refill takes less than 2 seconds.      Coloration: Skin is not pale.   Neurological:      General: No focal deficit present.      Mental Status: She is alert and oriented to person, place, and time. Mental status is at baseline.   Psychiatric:         Mood and Affect: Mood normal.         Behavior: Behavior normal.         Thought Content: Thought content normal.             Labs:  Recent Labs     06/09/23  0342 06/10/23  0933 06/11/23  0423    SODIUM 135 137 137   POTASSIUM 4.7 4.5 4.4   CHLORIDE 104 105 106   CO2 19* 20 21   BUN 14 13 11   CREATININE 0.88 0.77 0.81   MAGNESIUM 1.8 2.0  --    PHOSPHORUS 3.8 3.9 4.4   CALCIUM 8.3* 8.7 8.3*       Recent Labs     06/09/23  0342 06/10/23  0933 06/11/23  0423   GLUCOSE 99 127* 94       Recent Labs     06/09/23  0342   WBC 7.4     Recent Labs     06/09/23  0342   RBC 2.56*   HEMOGLOBIN 7.6*   HEMATOCRIT 25.1*   PLATELETCT 334     Recent Results (from the past 24 hour(s))   MAGNESIUM    Collection Time: 06/10/23  9:33 AM   Result Value Ref Range    Magnesium 2.0 1.5 - 2.5 mg/dL   PHOSPHORUS    Collection Time: 06/10/23  9:33 AM   Result Value Ref Range    Phosphorus 3.9 2.5 - 4.5 mg/dL   Basic Metabolic Panel    Collection Time: 06/10/23  9:33 AM   Result Value Ref Range    Sodium 137 135 - 145 mmol/L    Potassium 4.5 3.6 - 5.5 mmol/L    Chloride 105 96 - 112 mmol/L    Co2 20 20 - 33 mmol/L    Glucose 127 (H) 65 - 99 mg/dL    Bun 13 8 - 22 mg/dL    Creatinine 0.77 0.50 - 1.40 mg/dL    Calcium 8.7 8.5 - 10.5 mg/dL    Anion Gap 12.0 7.0 - 16.0   ESTIMATED GFR    Collection Time: 06/10/23  9:33 AM   Result Value Ref Range    GFR (CKD-EPI) 93 >60 mL/min/1.73 m 2   Renal Function Panel    Collection Time: 06/11/23  4:23 AM   Result Value Ref Range    Sodium 137 135 - 145 mmol/L    Potassium 4.4 3.6 - 5.5 mmol/L    Chloride 106 96 - 112 mmol/L    Co2 21 20 - 33 mmol/L    Glucose 94 65 - 99 mg/dL    Creatinine 0.81 0.50 - 1.40 mg/dL    Bun 11 8 - 22 mg/dL    Calcium 8.3 (L) 8.5 - 10.5 mg/dL    Phosphorus 4.4 2.5 - 4.5 mg/dL    Albumin 2.8 (L) 3.2 - 4.9 g/dL   CORRECTED CALCIUM    Collection Time: 06/11/23  4:23 AM   Result Value Ref Range    Correct Calcium 9.3 8.5 - 10.5 mg/dL   ESTIMATED GFR    Collection Time: 06/11/23  4:23 AM   Result Value Ref Range    GFR (CKD-EPI) 88 >60 mL/min/1.73 m 2       Radiology Review:  IR-US GUIDED PIV   Final Result    Ultrasound-guided PERIPHERAL IV INSERTION performed by   "  qualified nursing staff as above.      [id number] VIEW   Final Result   Addendum (preliminary) 1 of 1   There is a feeding tube present with the tip overlying the gastric fundus.      Final      1.  No evidence of bowel obstruction.      2.  Constipation.      3.  Possible left nephrolith.      DX-ABDOMEN FOR TUBE PLACEMENT   Final Result      Enteric tube tip projects over the stomach.      IR-MIDLINE CATHETER INSERTION WO GUIDANCE > AGE 3   Final Result                  Ultrasound-guided midline placement performed by qualified nursing staff    as above.          DX-ABDOMEN FOR TUBE PLACEMENT   Final Result      Nasogastric tube is now satisfactorily with the stomach.      DX-ABDOMEN FOR TUBE PLACEMENT   Final Result      Nasogastric tube side-port is in the distal esophagus. Recommend advancement.            MDM (Data Review):   -Records reviewed and summarized in current documentation  -I personally reviewed and interpreted the laboratory results  -I personally reviewed the radiology images    Assessment/Recommendations:  Impressions:  Failure to thrive  Starvation ketoacidosis  Chronic abdominal pain  Severe malnutrition  Hepatomegaly   Acute kidney injury  High anion gap metabolic acidosis  Electrolyte derangements  Hypokalemia  Hyponatremia  Hypocalcemia  Urinary tract infection  Thrush  Bipolar 1 disorder      MDM:   Per surgery Dr. Marlow 5/30: \"Would recommend she be tolerating goal tube feeds (45cc/hr) for one week without significant electrolyte dyscrasias before any feeding access be attempted\"  Goal tube feeding reached 6/4. Unfortunately patient's NGT became clogged but remains in place. Patient was tolerating magic cups for nutrition, electrolytes stable and patient without signs of refeeding syndrome. Discussed PEG-J tube with patient and she is agreeable to proceed.     Recommendations:  Ok to remove NGT from GI aspect if not in use.  AM labs   Continued diet per primary team, NPO at " Midnight  Plan for PEG-J tube tomorrow.       Plan of care discussed with patient, Angel PERDOMO, Dr. Chen, Dr. Anderson      Core Quality Measures   Reviewed items::  Labs, Medications and Radiology reports reviewed

## 2023-06-11 NOTE — CARE PLAN
The patient is Stable - Low risk of patient condition declining or worsening    Shift Goals  Clinical Goals: Pain mngmt, improve oral intake, rest  Patient Goals: Pain mgnmt, rest  Family Goals: NAIN    Progress made toward(s) clinical / shift goals: Medicated for pain per MAR, pt consumed one magic cup supplement.     Problem: Pain - Standard  Goal: Alleviation of pain or a reduction in pain to the patient’s comfort goal  Outcome: Progressing     Problem: Knowledge Deficit - Standard  Goal: Patient and family/care givers will demonstrate understanding of plan of care, disease process/condition, diagnostic tests and medications  Outcome: Progressing     Problem: Fall Risk  Goal: Patient will remain free from falls  Outcome: Progressing     Problem: Skin Integrity  Goal: Skin integrity is maintained or improved  Outcome: Progressing

## 2023-06-11 NOTE — HOSPITAL COURSE
This is a 53-year-old female with past medical history of severe RA, recurrent UTIs and recurrent starvation ketosis who was brought to the ED on 5/25/2023 with altered mental status, significant for HASMUKH, UTI.    It appears patient was being evaluated by surgery outpatient for G-tube placement however patient initially refused, now she is amendable.  GI was consulted during this admission, PEG tube was placed on 6/12/2023.  Discussed the case with dietitian, patient can use boost 4 times daily.  Nursing staff to instruct patient on PEG tube maintenance.  Patient has close follow-up with PCP, pain management and GI.    Patient was started on IV fluids, HASMUKH resolved.  She completed antibiotic course for UTI.  All electrolyte abnormalities resolved.

## 2023-06-11 NOTE — CARE PLAN
The patient is Stable - Low risk of patient condition declining or worsening    Shift Goals  Clinical Goals: pain mgmt, improve oral intake, rest  Patient Goals: pain mgmt, rest  Family Goals: NAIN    Progress made toward(s) clinical / shift goals:  pt meds passed per MAR, no injuries this shift      Problem: Pain - Standard  Goal: Alleviation of pain or a reduction in pain to the patient’s comfort goal  Outcome: Progressing     Problem: Knowledge Deficit - Standard  Goal: Patient and family/care givers will demonstrate understanding of plan of care, disease process/condition, diagnostic tests and medications  Outcome: Progressing     Problem: Fall Risk  Goal: Patient will remain free from falls  Outcome: Progressing     Problem: Skin Integrity  Goal: Skin integrity is maintained or improved  Outcome: Progressing       Patient is not progressing towards the following goals:

## 2023-06-11 NOTE — PROGRESS NOTES
Rec'd pt report.  PT ao x 4, primarily stays in bed, uses bed pan, pleasant, and cooperative.  Pt meds passed per MAR, no injuries this shift.  C/o pain @ the throat medicated with prn pain meds per MAR.  NG dc'd per MD.  Call light in reach, bed at lowest position, no needs at this time.

## 2023-06-11 NOTE — PROGRESS NOTES
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    During the stay in the hospital she was treated with antibiotics, nystatin along with fluid her acute renal failure has resolved.  She has completed treatment for urinary tract infection.    Considering patient had a starvation, NG tube was placed and D5 was started surgery was consulted for G-tube placement, surgery recommended talking to GI for PEG once feeding is required for the physical or at least for a week\" with no signs of refeeding syndrome.    PT and OT evaluated recommended home health.  Patient is currently n.p.o., will be going for PEG tube placement tomorrow.    Interval events:  -No acute events overnight, vital signs been stable, patient is alert, awake, answering questions appropriately  --Magnesium noted 1.6, will provide 1 g of IV magnesium, potassium normal at 4.3.  We will continue p.o. thiamine and folic acid.  --Patient has been cellulitis per speech recommendation, will make n.p.o. at midnight,  will be going for peg tube placement       Discussed the patient with nursing staff, case management    Consultants/Specialty  general surgery and GI    Code Status  DNAR/DNI    Disposition  The patient is not medically cleared for discharge to home or a post-acute facility.  Anticipate discharge to: home with organized home healthcare and close outpatient follow-up      Review of Systems  Review of Systems   Constitutional:  Positive for malaise/fatigue. Negative for fever.   HENT:  Negative for congestion.    Eyes:  Negative for blurred vision and double " vision.   Respiratory:  Negative for cough and hemoptysis.    Cardiovascular:  Negative for chest pain.   Gastrointestinal:  Negative for abdominal pain, heartburn, nausea and vomiting.   Genitourinary:  Negative for dysuria.   Musculoskeletal:  Positive for myalgias.   Psychiatric/Behavioral:  The patient is nervous/anxious.    All other systems reviewed and are negative.       Physical Exam  Temp:  [36.6 °C (97.8 °F)-37.2 °C (99 °F)] 36.7 °C (98.1 °F)  Pulse:  [71-92] 71  Resp:  [16-18] 16  BP: ()/(53-66) 96/62  SpO2:  [95 %-99 %] 99 %    Physical Exam  Vitals reviewed.   Constitutional:       Appearance: Normal appearance.      Comments: Cachectic. Body mass index is 16.41 kg/m².   HENT:      Head: Normocephalic and atraumatic.      Nose:      Comments: iris feeding tube in place  Eyes:      Extraocular Movements: Extraocular movements intact.      Pupils: Pupils are equal, round, and reactive to light.   Cardiovascular:      Rate and Rhythm: Normal rate and regular rhythm.   Pulmonary:      Effort: Pulmonary effort is normal.   Abdominal:      General: Bowel sounds are normal. There is no distension.      Palpations: Abdomen is soft.   Musculoskeletal:      Cervical back: Neck supple. No muscular tenderness.      Right lower leg: No edema.      Left lower leg: No edema.   Skin:     General: Skin is warm.   Neurological:      Mental Status: She is alert and oriented to person, place, and time. Mental status is at baseline.      Cranial Nerves: No cranial nerve deficit.   Psychiatric:         Mood and Affect: Mood normal.         Behavior: Behavior normal.         Thought Content: Thought content normal.         Judgment: Judgment normal.             Fluids    Intake/Output Summary (Last 24 hours) at 6/11/2023 1031  Last data filed at 6/10/2023 1800  Gross per 24 hour   Intake 480 ml   Output --   Net 480 ml         Laboratory  Recent Labs     06/09/23  0342   WBC 7.4   RBC 2.56*   HEMOGLOBIN 7.6*    HEMATOCRIT 25.1*   MCV 98.0*   MCH 29.7   MCHC 30.3*   RDW 74.9*   PLATELETCT 334   MPV 10.9         Recent Labs     06/10/23  0933 06/11/23  0423 06/11/23  0909   SODIUM 137 137 138   POTASSIUM 4.5 4.4 4.3   CHLORIDE 105 106 106   CO2 20 21 19*   GLUCOSE 127* 94 89   BUN 13 11 10   CREATININE 0.77 0.81 0.75   CALCIUM 8.7 8.3* 8.3*                     Imaging  IR-US GUIDED PIV   Final Result    Ultrasound-guided PERIPHERAL IV INSERTION performed by    qualified nursing staff as above.      [id number] VIEW   Final Result   Addendum (preliminary) 1 of 1   There is a feeding tube present with the tip overlying the gastric fundus.      Final      1.  No evidence of bowel obstruction.      2.  Constipation.      3.  Possible left nephrolith.      DX-ABDOMEN FOR TUBE PLACEMENT   Final Result      Enteric tube tip projects over the stomach.      IR-MIDLINE CATHETER INSERTION WO GUIDANCE > AGE 3   Final Result                  Ultrasound-guided midline placement performed by qualified nursing staff    as above.          DX-ABDOMEN FOR TUBE PLACEMENT   Final Result      Nasogastric tube is now satisfactorily with the stomach.      DX-ABDOMEN FOR TUBE PLACEMENT   Final Result      Nasogastric tube side-port is in the distal esophagus. Recommend advancement.             Assessment/Plan  * High anion gap metabolic acidosis- (present on admission)  Assessment & Plan  -Due to starvation ketosis  -Lactic acid negative  -Resolved          Failure to thrive in adult- (present on admission)  Assessment & Plan  -with severe malnutrition and muscle wasting, cachexia  -Continued tube feeding per NGT.  Now at goal at 45 cc/h.  Monitor for refeeding syndrome.  Also tolerating oral diet per SLP recommendations -  level 7 (easy to chew) diet with thin liquids.  -Throat pain better with exchange of feeding tube to Iris from big bore and rigid NGT.  - Per surgery G-tube placement may be difficult due to her prior abdominal surgeries and  recommended to discuss with GI for PEG placement once tube feeding is at goal (at 45cc/hr) for at least one week and no further signs of refeeding syndrome.  If unable to place PEG tube, can consider surgical J tube placement.  I again discussed this with GI, who will reevaluate, will likely plan for permanent enteric access placement on Sunday or Monday.  She will need PEG-J tube placement.      Will make NPO tonight    Hypocalcemia- (present on admission)  Assessment & Plan  -Improving.    -continue replacement with tums  -Monitor level daily    Anemia- (present on admission)  Assessment & Plan  -Acute on chronic  -Currently hemodilution and frequent blood draws contributing - trying to limit blood draws.  -Continue to monitor, Hgb has been stable.    Bipolar 1 disorder (HCC)- (present on admission)  Assessment & Plan  -Continue Paxil and Seroquel    Starvation ketoacidosis- (present on admission)  Assessment & Plan  -resolved.     Severe protein-calorie malnutrition (HCC)- (present on admission)  Assessment & Plan  -She remains very high risk for refeeding syndrome, will continue to follow very closely with serial electrolytes.  -Continue tube feeds as above.     SMAS (superior mesenteric artery syndrome) c/b feeding issues ([facility])- (present on admission)  Assessment & Plan  -Contributing to severe malnutrition, chronic abdominal pain.    Chronic pain- (present on admission)  Assessment & Plan  -Patient on home oxycodone but narcotics may be contributing to her chronic emesis.   - Continue Paxil.  Continue oral oxycodone and IV Dilaudid for pain.    Hypophosphatemia- (present on admission)  Assessment & Plan  -Replaced.  -Resolved but high risk for fluctuation and remains high risk for refeeding syndrome  -Continue to closely follow level daily      HASMUKH (acute kidney injury) (HCC)- (present on admission)  Assessment & Plan  - Creatinine has normalized.   - off IVF.  -Monitor for improvement in renal function.  BMP  in the morning.  - avoid nephrotoxins, and continue to renally dose all medications.    Hypokalemia- (present on admission)  Assessment & Plan  Replete as needed    Rheumatoid arthritis (HCC)- (present on admission)  Assessment & Plan  -With chronic deformity and s/p amputations  -No evidence of acute flair  -Continue chronic analgesics.    Pancreatitis- (present on admission)  Assessment & Plan  -H/o. Lipase negative.    Hyponatremia- (present on admission)  Assessment & Plan  -Likely due to severe dehydration.  Now resolved.  -Follow sodium levels.    UTI (urinary tract infection)- (present on admission)  Assessment & Plan  - Completed course of antibiotics.    Thrush- (present on admission)  Assessment & Plan  -HIV negative.  No further visible thrush.   -Continue nystatin swish and swallow.    Unintentional weight loss- (present on admission)  Assessment & Plan  -Related to SMA and inability to keep food down.  -HIV and lipase negative.  -Malnutrition contributing  - tube feeding  Npo tomorrow,will be going peg tube      Tobacco dependence- (present on admission)  Assessment & Plan  - Counseled on smoking cessation.         VTE prophylaxis: enoxaparin ppx    I have performed a physical exam and reviewed and updated ROS and Plan today (6/11/2023). In review of yesterday's note (6/10/2023), there are no changes except as documented above.

## 2023-06-12 ENCOUNTER — ANESTHESIA (OUTPATIENT)
Dept: SURGERY | Facility: MEDICAL CENTER | Age: 53
DRG: 682 | End: 2023-06-12
Payer: MEDICAID

## 2023-06-12 ENCOUNTER — ANESTHESIA EVENT (OUTPATIENT)
Dept: SURGERY | Facility: MEDICAL CENTER | Age: 53
DRG: 682 | End: 2023-06-12
Payer: MEDICAID

## 2023-06-12 LAB
ALBUMIN SERPL BCP-MCNC: 2.7 G/DL (ref 3.2–4.9)
ANION GAP SERPL CALC-SCNC: 10 MMOL/L (ref 7–16)
BUN SERPL-MCNC: 11 MG/DL (ref 8–22)
CALCIUM ALBUM COR SERPL-MCNC: 9.4 MG/DL (ref 8.5–10.5)
CALCIUM SERPL-MCNC: 8.4 MG/DL (ref 8.5–10.5)
CHLORIDE SERPL-SCNC: 109 MMOL/L (ref 96–112)
CO2 SERPL-SCNC: 20 MMOL/L (ref 20–33)
CREAT SERPL-MCNC: 0.8 MG/DL (ref 0.5–1.4)
CRP SERPL HS-MCNC: 1.12 MG/DL (ref 0–0.75)
GFR SERPLBLD CREATININE-BSD FMLA CKD-EPI: 89 ML/MIN/1.73 M 2
GLUCOSE SERPL-MCNC: 83 MG/DL (ref 65–99)
INR PPP: 1.17 (ref 0.87–1.13)
MAGNESIUM SERPL-MCNC: 2.3 MG/DL (ref 1.5–2.5)
PHOSPHATE SERPL-MCNC: 4.1 MG/DL (ref 2.5–4.5)
POTASSIUM SERPL-SCNC: 4.6 MMOL/L (ref 3.6–5.5)
PREALB SERPL-MCNC: 17 MG/DL (ref 18–38)
PROTHROMBIN TIME: 14.7 SEC (ref 12–14.6)
SODIUM SERPL-SCNC: 139 MMOL/L (ref 135–145)

## 2023-06-12 PROCEDURE — 770001 HCHG ROOM/CARE - MED/SURG/GYN PRIV*

## 2023-06-12 PROCEDURE — 110372 HCHG SHELL REV 278: Performed by: INTERNAL MEDICINE

## 2023-06-12 PROCEDURE — 86140 C-REACTIVE PROTEIN: CPT

## 2023-06-12 PROCEDURE — 80069 RENAL FUNCTION PANEL: CPT

## 2023-06-12 PROCEDURE — 700102 HCHG RX REV CODE 250 W/ 637 OVERRIDE(OP): Performed by: HOSPITALIST

## 2023-06-12 PROCEDURE — 00731 ANES UPR GI NDSC PX NOS: CPT | Performed by: ANESTHESIOLOGY

## 2023-06-12 PROCEDURE — 99232 SBSQ HOSP IP/OBS MODERATE 35: CPT | Performed by: HOSPITALIST

## 2023-06-12 PROCEDURE — 83735 ASSAY OF MAGNESIUM: CPT

## 2023-06-12 PROCEDURE — A9270 NON-COVERED ITEM OR SERVICE: HCPCS | Performed by: HOSPITALIST

## 2023-06-12 PROCEDURE — 0DH63UZ INSERTION OF FEEDING DEVICE INTO STOMACH, PERCUTANEOUS APPROACH: ICD-10-PCS | Performed by: INTERNAL MEDICINE

## 2023-06-12 PROCEDURE — 700105 HCHG RX REV CODE 258: Performed by: INTERNAL MEDICINE

## 2023-06-12 PROCEDURE — 43246 EGD PLACE GASTROSTOMY TUBE: CPT | Performed by: INTERNAL MEDICINE

## 2023-06-12 PROCEDURE — A9270 NON-COVERED ITEM OR SERVICE: HCPCS | Performed by: ANESTHESIOLOGY

## 2023-06-12 PROCEDURE — 160009 HCHG ANES TIME/MIN: Performed by: INTERNAL MEDICINE

## 2023-06-12 PROCEDURE — 160036 HCHG PACU - EA ADDL 30 MINS PHASE I: Performed by: INTERNAL MEDICINE

## 2023-06-12 PROCEDURE — 85610 PROTHROMBIN TIME: CPT

## 2023-06-12 PROCEDURE — 700102 HCHG RX REV CODE 250 W/ 637 OVERRIDE(OP): Performed by: ANESTHESIOLOGY

## 2023-06-12 PROCEDURE — 160035 HCHG PACU - 1ST 60 MINS PHASE I: Performed by: INTERNAL MEDICINE

## 2023-06-12 PROCEDURE — 160202 HCHG ENDO MINUTES - 1ST 30 MINS LEVEL 3: Performed by: INTERNAL MEDICINE

## 2023-06-12 PROCEDURE — 700111 HCHG RX REV CODE 636 W/ 250 OVERRIDE (IP): Performed by: ANESTHESIOLOGY

## 2023-06-12 PROCEDURE — 160048 HCHG OR STATISTICAL LEVEL 1-5: Performed by: INTERNAL MEDICINE

## 2023-06-12 PROCEDURE — 160207 HCHG ENDO MINUTES - EA ADDL 1 MIN LEVEL 3: Performed by: INTERNAL MEDICINE

## 2023-06-12 PROCEDURE — 160002 HCHG RECOVERY MINUTES (STAT): Performed by: INTERNAL MEDICINE

## 2023-06-12 PROCEDURE — 84134 ASSAY OF PREALBUMIN: CPT

## 2023-06-12 DEVICE — KIT 20 FRENCH PULL SAFETY PEG: Type: IMPLANTABLE DEVICE | Site: ABDOMEN | Status: FUNCTIONAL

## 2023-06-12 RX ORDER — DIPHENHYDRAMINE HYDROCHLORIDE 50 MG/ML
12.5 INJECTION INTRAMUSCULAR; INTRAVENOUS
Status: DISCONTINUED | OUTPATIENT
Start: 2023-06-12 | End: 2023-06-12 | Stop reason: HOSPADM

## 2023-06-12 RX ORDER — HYDROMORPHONE HYDROCHLORIDE 1 MG/ML
0.1 INJECTION, SOLUTION INTRAMUSCULAR; INTRAVENOUS; SUBCUTANEOUS
Status: DISCONTINUED | OUTPATIENT
Start: 2023-06-12 | End: 2023-06-12 | Stop reason: HOSPADM

## 2023-06-12 RX ORDER — SODIUM CHLORIDE, SODIUM LACTATE, POTASSIUM CHLORIDE, CALCIUM CHLORIDE 600; 310; 30; 20 MG/100ML; MG/100ML; MG/100ML; MG/100ML
INJECTION, SOLUTION INTRAVENOUS CONTINUOUS
Status: DISCONTINUED | OUTPATIENT
Start: 2023-06-12 | End: 2023-06-12

## 2023-06-12 RX ORDER — CEFAZOLIN SODIUM 1 G/3ML
INJECTION, POWDER, FOR SOLUTION INTRAMUSCULAR; INTRAVENOUS PRN
Status: DISCONTINUED | OUTPATIENT
Start: 2023-06-12 | End: 2023-06-12 | Stop reason: SURG

## 2023-06-12 RX ORDER — ONDANSETRON 2 MG/ML
4 INJECTION INTRAMUSCULAR; INTRAVENOUS
Status: DISCONTINUED | OUTPATIENT
Start: 2023-06-12 | End: 2023-06-12 | Stop reason: HOSPADM

## 2023-06-12 RX ORDER — SODIUM CHLORIDE, SODIUM LACTATE, POTASSIUM CHLORIDE, CALCIUM CHLORIDE 600; 310; 30; 20 MG/100ML; MG/100ML; MG/100ML; MG/100ML
INJECTION, SOLUTION INTRAVENOUS CONTINUOUS
Status: DISCONTINUED | OUTPATIENT
Start: 2023-06-12 | End: 2023-06-12 | Stop reason: HOSPADM

## 2023-06-12 RX ORDER — HYDROMORPHONE HYDROCHLORIDE 1 MG/ML
0.4 INJECTION, SOLUTION INTRAMUSCULAR; INTRAVENOUS; SUBCUTANEOUS
Status: DISCONTINUED | OUTPATIENT
Start: 2023-06-12 | End: 2023-06-12 | Stop reason: HOSPADM

## 2023-06-12 RX ORDER — PHENYLEPHRINE HCL IN 0.9% NACL 0.5 MG/5ML
SYRINGE (ML) INTRAVENOUS PRN
Status: DISCONTINUED | OUTPATIENT
Start: 2023-06-12 | End: 2023-06-12 | Stop reason: SURG

## 2023-06-12 RX ORDER — OXYCODONE HCL 5 MG/5 ML
10 SOLUTION, ORAL ORAL
Status: COMPLETED | OUTPATIENT
Start: 2023-06-12 | End: 2023-06-12

## 2023-06-12 RX ORDER — OXYCODONE HCL 5 MG/5 ML
5 SOLUTION, ORAL ORAL
Status: COMPLETED | OUTPATIENT
Start: 2023-06-12 | End: 2023-06-12

## 2023-06-12 RX ORDER — HYDROMORPHONE HYDROCHLORIDE 1 MG/ML
0.2 INJECTION, SOLUTION INTRAMUSCULAR; INTRAVENOUS; SUBCUTANEOUS
Status: DISCONTINUED | OUTPATIENT
Start: 2023-06-12 | End: 2023-06-12 | Stop reason: HOSPADM

## 2023-06-12 RX ORDER — MIDAZOLAM HYDROCHLORIDE 1 MG/ML
INJECTION INTRAMUSCULAR; INTRAVENOUS PRN
Status: DISCONTINUED | OUTPATIENT
Start: 2023-06-12 | End: 2023-06-12 | Stop reason: SURG

## 2023-06-12 RX ADMIN — OXYCODONE HYDROCHLORIDE 10 MG: 5 SOLUTION ORAL at 11:13

## 2023-06-12 RX ADMIN — SENNOSIDES AND DOCUSATE SODIUM 2 TABLET: 50; 8.6 TABLET ORAL at 05:32

## 2023-06-12 RX ADMIN — MIDAZOLAM 2 MG: 1 INJECTION, SOLUTION INTRAMUSCULAR; INTRAVENOUS at 10:09

## 2023-06-12 RX ADMIN — Medication 100 MCG: at 10:49

## 2023-06-12 RX ADMIN — SODIUM CHLORIDE, POTASSIUM CHLORIDE, SODIUM LACTATE AND CALCIUM CHLORIDE: 600; 310; 30; 20 INJECTION, SOLUTION INTRAVENOUS at 10:08

## 2023-06-12 RX ADMIN — FENTANYL CITRATE 100 MCG: 50 INJECTION, SOLUTION INTRAMUSCULAR; INTRAVENOUS at 10:16

## 2023-06-12 RX ADMIN — ANTACID TABLETS 500 MG: 500 TABLET, CHEWABLE ORAL at 05:32

## 2023-06-12 RX ADMIN — PROPOFOL 50 MG: 10 INJECTION, EMULSION INTRAVENOUS at 10:25

## 2023-06-12 RX ADMIN — PROPOFOL 50 MG: 10 INJECTION, EMULSION INTRAVENOUS at 10:51

## 2023-06-12 RX ADMIN — QUETIAPINE FUMARATE 100 MG: 100 TABLET ORAL at 16:32

## 2023-06-12 RX ADMIN — FENTANYL CITRATE 50 MCG: 50 INJECTION, SOLUTION INTRAMUSCULAR; INTRAVENOUS at 11:34

## 2023-06-12 RX ADMIN — FENTANYL CITRATE 100 MCG: 50 INJECTION, SOLUTION INTRAMUSCULAR; INTRAVENOUS at 10:11

## 2023-06-12 RX ADMIN — PAROXETINE HYDROCHLORIDE 60 MG: 20 TABLET, FILM COATED ORAL at 16:32

## 2023-06-12 RX ADMIN — PROPOFOL 50 MG: 10 INJECTION, EMULSION INTRAVENOUS at 10:37

## 2023-06-12 RX ADMIN — Medication 100 MCG: at 10:31

## 2023-06-12 RX ADMIN — OXYCODONE HYDROCHLORIDE 10 MG: 10 TABLET ORAL at 00:25

## 2023-06-12 RX ADMIN — FENTANYL CITRATE 50 MCG: 50 INJECTION, SOLUTION INTRAMUSCULAR; INTRAVENOUS at 11:13

## 2023-06-12 RX ADMIN — OMEPRAZOLE 40 MG: 20 CAPSULE, DELAYED RELEASE ORAL at 21:41

## 2023-06-12 RX ADMIN — OXYCODONE HYDROCHLORIDE 10 MG: 10 TABLET ORAL at 14:13

## 2023-06-12 RX ADMIN — Medication 100 MG: at 05:33

## 2023-06-12 RX ADMIN — PROPOFOL 50 MG: 10 INJECTION, EMULSION INTRAVENOUS at 10:17

## 2023-06-12 RX ADMIN — HYDROMORPHONE HYDROCHLORIDE 1 MG: 2 TABLET ORAL at 16:45

## 2023-06-12 RX ADMIN — HYDROMORPHONE HYDROCHLORIDE 1 MG: 2 TABLET ORAL at 19:34

## 2023-06-12 RX ADMIN — OXYCODONE HYDROCHLORIDE 10 MG: 10 TABLET ORAL at 05:36

## 2023-06-12 RX ADMIN — CEFAZOLIN 1 G: 1 INJECTION, POWDER, FOR SOLUTION INTRAMUSCULAR; INTRAVENOUS at 10:18

## 2023-06-12 ASSESSMENT — ENCOUNTER SYMPTOMS
MYALGIAS: 0
FEVER: 0
ABDOMINAL PAIN: 0
HEMOPTYSIS: 0
COUGH: 0
HEARTBURN: 0
BLURRED VISION: 0
NERVOUS/ANXIOUS: 1

## 2023-06-12 ASSESSMENT — PAIN DESCRIPTION - PAIN TYPE
TYPE: ACUTE PAIN
TYPE: SURGICAL PAIN
TYPE: ACUTE PAIN
TYPE: SURGICAL PAIN
TYPE: ACUTE PAIN;SURGICAL PAIN
TYPE: SURGICAL PAIN
TYPE: SURGICAL PAIN
TYPE: ACUTE PAIN
TYPE: ACUTE PAIN
TYPE: SURGICAL PAIN

## 2023-06-12 ASSESSMENT — PAIN SCALES - GENERAL: PAIN_LEVEL: 0

## 2023-06-12 NOTE — ANESTHESIA POSTPROCEDURE EVALUATION
Patient: Mary Martinez    Procedure Summary     Date: 06/12/23 Room / Location: [facility] ROOM 26 / SURGERY SAME DAY [facility]    Anesthesia Start: 1008 Anesthesia Stop: 1105    Procedures:       INSERTION, PEG TUBE - PEG AND J TUBE PLACEMENT (Abdomen)      GASTROSCOPY (Esophagus) Diagnosis: (percutaneous peg placement 2  @ the skin)    Surgeons: Marilyn Anderson M.D. Responsible Provider: Vinod Currie M.D.    Anesthesia Type: MAC ASA Status: 2          Final Anesthesia Type: MAC  Last vitals  BP   Blood Pressure: 92/44    Temp   37 °C (98.6 °F)    Pulse   70   Resp   16    SpO2   98 %      Anesthesia Post Evaluation    Patient location during evaluation: PACU  Patient participation: complete - patient participated  Level of consciousness: awake and alert  Pain score: 0    Airway patency: patent  Anesthetic complications: no  Cardiovascular status: hemodynamically stable  Respiratory status: acceptable  Hydration status: euvolemic    PONV: none          No notable events documented.     Nurse Pain Score: 4 (NPRS)

## 2023-06-12 NOTE — CARE PLAN
The patient is Stable - Low risk of patient condition declining or worsening    Shift Goals  Clinical Goals: Pain management , prep for surgery in am  Patient Goals: adequate pain control  Family Goals: NAIN    Progress made toward(s) clinical / shift goals:  on going    Patient is progressing towards the following goals:  Problem: Fall Risk  Goal: Patient will remain free from falls  Outcome: Progressing     Problem: Skin Integrity  Goal: Skin integrity is maintained or improved  Outcome: Progressing     Problem: Pain - Standard  Goal: Alleviation of pain or a reduction in pain to the patient’s comfort goal  Outcome: Progressing     Problem: Knowledge Deficit - Standard  Goal: Patient and family/care givers will demonstrate understanding of plan of care, disease process/condition, diagnostic tests and medications  Outcome: Progressing

## 2023-06-12 NOTE — PROCEDURES
OPERATIVE REPORT    PATIENT:   Mary Martinez   1970       PREOPERATIVE DIAGNOSES/INDICATIONS: failure to thrive    POSTOPERATIVE DIAGNOSIS: successful PEG with jejunal extension tube placement    PROCEDURE:  ESOPHAGOGASTRODUODENOSCOPY with PEG-JET    PHYSICIAN:  Marilyn Anderson MD    ANESTHESIA:  Per anesthesiologist.    LOCATION: [facility]    CONSENT:  OBTAINED. The risks, benefits and alternatives of the procedure were discussed in details. The risks include and are not limited to bleeding, infection, perforation, missed lesions, and sedations risks (cardiopulmonary compromise and allergic reaction to medications).    DESCRIPTION: The patient presented to the preoperative night.  After routine checkup was performed, patient was brought into the endoscopy suite.  Patient was placed in the supine position. A bite block was placed in patient's mouth. Patient was sedated by anesthesia.  Vital signs were monitored throughout the procedure.  Oxygenation support was provided throughout the procedure. Upper endoscope was inserted into patient's mouth and advanced to the second portion of the duodenum under direct visualization.  Retroflexion was made within the stomach.      Excellent transillumination was noted in the mid upper stomach adjacent to a surgical scar.  Finger indentation into the gastric lumen also easily appreciated.  This area was prepped and draped in a sterile fashion.  Lidocaine 1%, #5cc's local anesthesia given.  A 1.2cm incision was made and  the angiocath was advanced easily  into the stomach.  The inner cannula was removed and the guidewire was advanced into the stomach.  The snare was advanced from the angiocath to the guidewire.  The iahaftuoi-fldxnuntm-zjnkt combination was pulled through the mouth. The PEG was attatched to the guidewire and pulled back through the abdominal wall.  The PEG outer button attatched.  The 8.5 Fr JET was advanced through the PEG.  The patient was  reintubated and there was a scant amount of blood in the stomach.  The suture attatched to the jejunal tube was grasped with the hemoclip and pulled to the distal duodenum/proximal jejunum and clipped into place.    The PEG skin yumi was at 2.5cm.  Some oozing at incision and bumper was tightened against the skin.  The wound was dressed.  The patient tolerated the procedure well.  There were no immediate complications.    OPERATIVE FINDINGS:    1. Esophagus: normal  2. Stomach: normal.  20 Fr PEG placed mid stomach without difficulty.  3. Duodenum: normal    RECOMMENDATIONS:  Start tube fdg via jejunal tube at midnight per protocol  Am labs  We will remove dressing tomorrow  Analgesia prn--expect she will have some pain post procedure  NO anticoagulation x 72 hrs.

## 2023-06-12 NOTE — ANESTHESIA TIME REPORT
Anesthesia Start and Stop Event Times     Date Time Event    6/12/2023 0959 Ready for Procedure     1008 Anesthesia Start     1105 Anesthesia Stop        Responsible Staff  06/12/23    Name Role Begin End    Vinod Currie M.D. Anesth 1000 1105        Overtime Reason:  no overtime (within assigned shift)    Comments:

## 2023-06-12 NOTE — ANESTHESIA PREPROCEDURE EVALUATION
Case: [id number] Date/Time: 06/12/23 1039    Procedures:       INSERTION, PEG TUBE - PEG AND J TUBE PLACEMENT      GASTROSCOPY    Location: [facility] ROOM 26 / SURGERY SAME DAY [facility]    Surgeons: Marilyn Anderson M.D.          Relevant Problems   CARDIAC   (positive) Rheumatoid aortitis      GI   (positive) Hiatal hernia         (positive) HASMUKH (acute kidney injury) (HCC)   (positive) Acute renal failure (HCC)   (positive) Other hydronephrosis   (positive) Renal calculus      Other   (positive) Arthritis, rheumatoid (HCC)   (positive) Rheumatoid arthritis (HCC)   (positive) Rheumatoid arthritis of hand (HCC)       Physical Exam    Airway   Mallampati: II  TM distance: >3 FB  Neck ROM: full       Cardiovascular - normal exam  Rhythm: regular  Rate: normal  (-) murmur     Dental - normal exam  (+) upper dentures, lower dentures           Pulmonary - normal exam  Breath sounds clear to auscultation     Abdominal    Neurological - normal exam                 Anesthesia Plan    ASA 2       Plan - MAC               Induction: intravenous    Postoperative Plan: Postoperative administration of opioids is intended.    Pertinent diagnostic labs and testing reviewed    Informed Consent:    Anesthetic plan and risks discussed with patient.    Use of blood products discussed with: patient whom consented to blood products.

## 2023-06-12 NOTE — PROGRESS NOTES
"Hospital Medicine Daily Progress Note    Date of Service  6/12/2023    Chief Complaint  Weakness, AMS, difficulty eating    Hospital Course  This is a 53 year-old female with a past medical significant for bipolar disorder, asthma, rheumatoid arthritis on Enbrel, chronic pain history of ex lap operated on 4/15/2029, chronic abdominal pain due to SMA syndrome was noted to have starvation/severe malnutrition developed hospital with HASMUKH and UTI.    During the stay in the hospital she was treated with antibiotics, nystatin along with fluid her acute renal failure has resolved.  She has completed treatment for urinary tract infection.    Considering patient had a starvation, NG tube was placed and D5 was started surgery was consulted for G-tube placement, surgery recommended talking to GI for PEG once feeding is required for the physical or at least for a week\" with no signs of refeeding syndrome.    PT and OT evaluated recommended home health.  Patient is currently n.p.o., will be going for PEG tube placement tomorrow.    Interval events:  -No acute events overnight, vital signs been stable, patient is alert, awake, answering questions appropriately  --Magnesium noted 1.6, will provide 1 g of IV magnesium, potassium normal at 4.3.  We will continue p.o. thiamine and folic acid.  --Patient has been cellulitis per speech recommendation, will make n.p.o. at midnight,  will be going for peg tube placement       6/12:  -- No acute events overnight, medicine has been stable, patient is alert, awake, answering questions appropriately, patient underwent PEG tube placement.  Patient can be started on tube feeding at midnight per protocol.  --As per GI, no anticoagulation for 72 hours thus DVT prophylaxis has been held  -- Patient is able to take p.o. intake of food but her diet has not been insufficient thus PEG tube was placed.  Patient needs tube feeds arranged.  Unable to provide home health considering patient being on " Medicaid FFS  - Need to be OOB TID with meals        Discussed the patient with nursing staff, case management    Consultants/Specialty  general surgery and GI    Code Status  DNAR/DNI    Disposition  The patient is not medically cleared for discharge to home or a post-acute facility.  Anticipate discharge to: home with organized home healthcare and close outpatient follow-up      Review of Systems  Review of Systems   Constitutional:  Positive for malaise/fatigue. Negative for fever.   HENT:  Negative for congestion.    Eyes:  Negative for blurred vision.   Respiratory:  Negative for cough and hemoptysis.    Cardiovascular:  Negative for chest pain.   Gastrointestinal:  Negative for abdominal pain and heartburn.   Genitourinary:  Negative for dysuria.   Musculoskeletal:  Negative for myalgias.   Psychiatric/Behavioral:  The patient is nervous/anxious.    All other systems reviewed and are negative.       Physical Exam  Temp:  [36.3 °C (97.4 °F)-37.5 °C (99.5 °F)] 36.4 °C (97.5 °F)  Pulse:  [65-96] 78  Resp:  [13-18] 18  BP: ()/(44-77) 100/69  SpO2:  [91 %-100 %] 99 %    Physical Exam  Vitals reviewed.   Constitutional:       Appearance: Normal appearance.      Comments: Cachectic.   HENT:      Head: Normocephalic and atraumatic.      Nose:      Comments: iris feeding tube in place  Eyes:      Extraocular Movements: Extraocular movements intact.      Pupils: Pupils are equal, round, and reactive to light.   Cardiovascular:      Rate and Rhythm: Normal rate and regular rhythm.   Pulmonary:      Effort: Pulmonary effort is normal.   Abdominal:      General: There is no distension.      Palpations: Abdomen is soft.      Comments: Peg tube in place   Musculoskeletal:      Cervical back: Neck supple. No muscular tenderness.      Right lower leg: No edema.      Left lower leg: No edema.   Skin:     General: Skin is warm.   Neurological:      Mental Status: She is alert and oriented to person, place, and time. Mental  status is at baseline.      Cranial Nerves: No cranial nerve deficit.   Psychiatric:         Mood and Affect: Mood normal.         Behavior: Behavior normal.         Thought Content: Thought content normal.         Judgment: Judgment normal.             Fluids  No intake or output data in the 24 hours ending 06/12/23 1544      Laboratory          Recent Labs     06/11/23  0423 06/11/23  0909 06/12/23  0014   SODIUM 137 138 139   POTASSIUM 4.4 4.3 4.6   CHLORIDE 106 106 109   CO2 21 19* 20   GLUCOSE 94 89 83   BUN 11 10 11   CREATININE 0.81 0.75 0.80   CALCIUM 8.3* 8.3* 8.4*       Recent Labs     06/12/23  0014   INR 1.17*               Imaging  IR-US GUIDED PIV   Final Result    Ultrasound-guided PERIPHERAL IV INSERTION performed by    qualified nursing staff as above.      IQ-AFFOSNP-3 VIEW   Final Result   Addendum (preliminary) 1 of 1   There is a feeding tube present with the tip overlying the gastric fundus.      Final      1.  No evidence of bowel obstruction.      2.  Constipation.      3.  Possible left nephrolith.      DX-ABDOMEN FOR TUBE PLACEMENT   Final Result      Enteric tube tip projects over the stomach.      IR-MIDLINE CATHETER INSERTION WO GUIDANCE > AGE 3   Final Result                  Ultrasound-guided midline placement performed by qualified nursing staff    as above.          DX-ABDOMEN FOR TUBE PLACEMENT   Final Result      Nasogastric tube is now satisfactorily with the stomach.      DX-ABDOMEN FOR TUBE PLACEMENT   Final Result      Nasogastric tube side-port is in the distal esophagus. Recommend advancement.             Assessment/Plan  * High anion gap metabolic acidosis- (present on admission)  Assessment & Plan  -Due to starvation ketosis  -Lactic acid negative  -Resolved          Failure to thrive in adult- (present on admission)  Assessment & Plan  -with severe malnutrition and muscle wasting, cachexia  -Continued tube feeding per NGT.  Now at goal at 45 cc/h.      - Per surgery G-tube  placement may be difficult due to her prior abdominal surgeries and recommended to discuss with GI for PEG placement once tube feeding is at goal (at 45cc/hr) for at least one week and no further signs of refeeding syndrome.    -- thus she replacement of PEG tube today with GI.  Will start tube feed tube tonight at midnight   --    Hypocalcemia- (present on admission)  Assessment & Plan  -Improving.    -continue replacement with tums  -Monitor level daily    Anemia- (present on admission)  Assessment & Plan  -Acute on chronic  -Monitor hemoglobin hematocrit, if less than 7, transfuse    Bipolar 1 disorder (HCC)- (present on admission)  Assessment & Plan  -Continue Paxil and Seroquel    Starvation ketoacidosis- (present on admission)  Assessment & Plan  -resolved.     Severe protein-calorie malnutrition (HCC)- (present on admission)  Assessment & Plan  -She remains very high risk for refeeding syndrome, will continue to follow very closely with serial electrolytes.  -Continue tube feeds as above.     SMAS (superior mesenteric artery syndrome) c/b feeding issues ([facility])- (present on admission)  Assessment & Plan  -Contributing to severe malnutrition, chronic abdominal pain.    Chronic pain- (present on admission)  Assessment & Plan  -Patient on home oxycodone but narcotics may be contributing to her chronic emesis.   - Continue Paxil.  Continue  Po pain meds    Hypophosphatemia- (present on admission)  Assessment & Plan  -Replaced.  -Resolved but high risk for fluctuation and remains high risk for refeeding syndrome  -Continue to closely follow level daily      HASMUKH (acute kidney injury) ([facility])- (present on admission)  Assessment & Plan  Resolved likely secondary to prerenal etiology    Hypokalemia- (present on admission)  Assessment & Plan  Replete as needed    Rheumatoid arthritis ([facility])- (present on admission)  Assessment & Plan  -With chronic deformity and s/p amputations  -No evidence of acute flair  -Continue chronic  analgesics.    Pancreatitis- (present on admission)  Assessment & Plan  -H/o. Lipase negative.    Hyponatremia- (present on admission)  Assessment & Plan  -Likely due to severe dehydration.  Now resolved.  -Follow sodium levels.    UTI (urinary tract infection)- (present on admission)  Assessment & Plan  - Completed course of antibiotics.    Thrush- (present on admission)  Assessment & Plan  -HIV negative.  No further visible thrush.   -Continue nystatin swish and swallow.    Unintentional weight loss- (present on admission)  Assessment & Plan  -Related to SMA and inability to keep food down.  -HIV and lipase negative.  -Malnutrition contributing  - PEG tube in place  RD consult for tube feed    Tobacco dependence- (present on admission)  Assessment & Plan  - Counseled on smoking cessation.         VTE prophylaxis: enoxaparin ppx    I have performed a physical exam and reviewed and updated ROS and Plan today (6/12/2023). In review of yesterday's note (6/11/2023), there are no changes except as documented above.

## 2023-06-12 NOTE — PROGRESS NOTES
Rec'd pt report.  Pt ao x 4, pleasant and cooperative.  Pt peg tube was placed today.  Pt meds passed per MAR, no injuries this shift.  Pt call light in reach and call appropriately, bed at lowest position, no needs at this time.

## 2023-06-12 NOTE — THERAPY
Physical Therapy Contact Note    Patient Name: Mary Martinez  Age:  53 y.o., Sex:  female  Medical Record #: [id number]  Today's Date: 6/12/2023 06/12/23 1052   Treatment Variance   Reason For Missed Therapy Medical - Patient  in Procedure   Other Treatments   Other Treatments Provided Pt unavailable for PT, having PEG placement. PT will cont to follow   Physical Therapy Treatment Plan   Physical Therapy Treatment Plan Continue Current Treatment Plan   Interdisciplinary Plan of Care Collaboration   IDT Collaboration with  Nursing   Session Information   Date / Session Number  6/8--5 (0/4,6/16) PTA/1   Supervising Physical Therapist (PTA Treatments Only)   Supervising Physical Therapist Velvet Pitt

## 2023-06-12 NOTE — OR NURSING
1103 received patient from OR. Report from Anesthesiologist and OR RN. Patient on 6L at 100 % O2. VSS. Monitor connected. No advanced airway in place. S/P PEG tube placement with J tube placement, incision NAIN, dressing in place.     1113 Oxycodone and fentanyl given for pain, IV not flushing, dressing changed and repositioned, able to flush     1134 Subsequent dose of fentanyl given for pain, patient placed on bedpan, able to void     1150 Report given to Angel PERDOMO    1205 Transferred to [place] [id number] via patient transport.

## 2023-06-13 ENCOUNTER — PATIENT OUTREACH (OUTPATIENT)
Dept: SCHEDULING | Facility: IMAGING CENTER | Age: 53
End: 2023-06-13
Payer: MEDICAID

## 2023-06-13 VITALS
SYSTOLIC BLOOD PRESSURE: 111 MMHG | BODY MASS INDEX: 15.12 KG/M2 | HEART RATE: 72 BPM | RESPIRATION RATE: 16 BRPM | TEMPERATURE: 97.8 F | OXYGEN SATURATION: 98 % | WEIGHT: 85.32 LBS | DIASTOLIC BLOOD PRESSURE: 73 MMHG | HEIGHT: 63 IN

## 2023-06-13 LAB
ALBUMIN SERPL BCP-MCNC: 2.7 G/DL (ref 3.2–4.9)
ALBUMIN/GLOB SERPL: 0.8 G/DL
ALP SERPL-CCNC: 116 U/L (ref 30–99)
ALT SERPL-CCNC: 6 U/L (ref 2–50)
ANION GAP SERPL CALC-SCNC: 12 MMOL/L (ref 7–16)
ANISOCYTOSIS BLD QL SMEAR: ABNORMAL
AST SERPL-CCNC: 7 U/L (ref 12–45)
BASOPHILS # BLD AUTO: 0.9 % (ref 0–1.8)
BASOPHILS # BLD: 0.06 K/UL (ref 0–0.12)
BILIRUB SERPL-MCNC: 0.2 MG/DL (ref 0.1–1.5)
BUN SERPL-MCNC: 8 MG/DL (ref 8–22)
CALCIUM ALBUM COR SERPL-MCNC: 9.5 MG/DL (ref 8.5–10.5)
CALCIUM SERPL-MCNC: 8.5 MG/DL (ref 8.5–10.5)
CHLORIDE SERPL-SCNC: 106 MMOL/L (ref 96–112)
CO2 SERPL-SCNC: 19 MMOL/L (ref 20–33)
COMMENT 1642: NORMAL
CREAT SERPL-MCNC: 0.81 MG/DL (ref 0.5–1.4)
EOSINOPHIL # BLD AUTO: 0.1 K/UL (ref 0–0.51)
EOSINOPHIL NFR BLD: 1.4 % (ref 0–6.9)
ERYTHROCYTE [DISTWIDTH] IN BLOOD BY AUTOMATED COUNT: 74.3 FL (ref 35.9–50)
GFR SERPLBLD CREATININE-BSD FMLA CKD-EPI: 88 ML/MIN/1.73 M 2
GLOBULIN SER CALC-MCNC: 3.5 G/DL (ref 1.9–3.5)
GLUCOSE SERPL-MCNC: 89 MG/DL (ref 65–99)
HCT VFR BLD AUTO: 25.1 % (ref 37–47)
HGB BLD-MCNC: 7.9 G/DL (ref 12–16)
IMM GRANULOCYTES # BLD AUTO: 0.03 K/UL (ref 0–0.11)
IMM GRANULOCYTES NFR BLD AUTO: 0.4 % (ref 0–0.9)
LYMPHOCYTES # BLD AUTO: 1.87 K/UL (ref 1–4.8)
LYMPHOCYTES NFR BLD: 26.6 % (ref 22–41)
MAGNESIUM SERPL-MCNC: 1.6 MG/DL (ref 1.5–2.5)
MCH RBC QN AUTO: 30.5 PG (ref 27–33)
MCHC RBC AUTO-ENTMCNC: 31.5 G/DL (ref 32.2–35.5)
MCV RBC AUTO: 96.9 FL (ref 81.4–97.8)
MICROCYTES BLD QL SMEAR: ABNORMAL
MONOCYTES # BLD AUTO: 0.57 K/UL (ref 0–0.85)
MONOCYTES NFR BLD AUTO: 8.1 % (ref 0–13.4)
MORPHOLOGY BLD-IMP: NORMAL
NEUTROPHILS # BLD AUTO: 4.39 K/UL (ref 1.82–7.42)
NEUTROPHILS NFR BLD: 62.6 % (ref 44–72)
NRBC # BLD AUTO: 0 K/UL
NRBC BLD-RTO: 0 /100 WBC (ref 0–0.2)
PHOSPHATE SERPL-MCNC: 4 MG/DL (ref 2.5–4.5)
PLATELET # BLD AUTO: 357 K/UL (ref 164–446)
PLATELET BLD QL SMEAR: NORMAL
PMV BLD AUTO: 11.4 FL (ref 9–12.9)
POTASSIUM SERPL-SCNC: 4.3 MMOL/L (ref 3.6–5.5)
PROT SERPL-MCNC: 6.2 G/DL (ref 6–8.2)
RBC # BLD AUTO: 2.59 M/UL (ref 4.2–5.4)
RBC BLD AUTO: PRESENT
SODIUM SERPL-SCNC: 137 MMOL/L (ref 135–145)
WBC # BLD AUTO: 7 K/UL (ref 4.8–10.8)

## 2023-06-13 PROCEDURE — A9270 NON-COVERED ITEM OR SERVICE: HCPCS | Performed by: GENERAL PRACTICE

## 2023-06-13 PROCEDURE — 99232 SBSQ HOSP IP/OBS MODERATE 35: CPT | Performed by: INTERNAL MEDICINE

## 2023-06-13 PROCEDURE — A9270 NON-COVERED ITEM OR SERVICE: HCPCS | Performed by: HOSPITALIST

## 2023-06-13 PROCEDURE — 83735 ASSAY OF MAGNESIUM: CPT

## 2023-06-13 PROCEDURE — 85025 COMPLETE CBC W/AUTO DIFF WBC: CPT

## 2023-06-13 PROCEDURE — 700102 HCHG RX REV CODE 250 W/ 637 OVERRIDE(OP): Performed by: GENERAL PRACTICE

## 2023-06-13 PROCEDURE — 84100 ASSAY OF PHOSPHORUS: CPT

## 2023-06-13 PROCEDURE — 99239 HOSP IP/OBS DSCHRG MGMT >30: CPT | Performed by: GENERAL PRACTICE

## 2023-06-13 PROCEDURE — 80053 COMPREHEN METABOLIC PANEL: CPT

## 2023-06-13 PROCEDURE — 700102 HCHG RX REV CODE 250 W/ 637 OVERRIDE(OP): Performed by: HOSPITALIST

## 2023-06-13 RX ORDER — OXYCODONE AND ACETAMINOPHEN 10; 325 MG/1; MG/1
1 TABLET ORAL EVERY 4 HOURS PRN
Qty: 35 TABLET | Refills: 0 | Status: SHIPPED
Start: 2023-06-13 | End: 2023-06-20

## 2023-06-13 RX ORDER — LANOLIN ALCOHOL/MO/W.PET/CERES
100 CREAM (GRAM) TOPICAL DAILY
Qty: 90 TABLET | Refills: 0 | Status: SHIPPED | OUTPATIENT
Start: 2023-06-14 | End: 2023-08-14

## 2023-06-13 RX ADMIN — Medication 100 MG: at 05:48

## 2023-06-13 RX ADMIN — ANTACID TABLETS 500 MG: 500 TABLET, CHEWABLE ORAL at 05:49

## 2023-06-13 RX ADMIN — SENNOSIDES AND DOCUSATE SODIUM 2 TABLET: 50; 8.6 TABLET ORAL at 05:49

## 2023-06-13 RX ADMIN — HYDROMORPHONE HYDROCHLORIDE 1 MG: 2 TABLET ORAL at 02:25

## 2023-06-13 RX ADMIN — OXYCODONE HYDROCHLORIDE 10 MG: 10 TABLET ORAL at 09:55

## 2023-06-13 ASSESSMENT — ENCOUNTER SYMPTOMS
CHILLS: 0
HEARTBURN: 0
NAUSEA: 0
NERVOUS/ANXIOUS: 0
DIARRHEA: 0
BLOOD IN STOOL: 0
DIZZINESS: 0
BACK PAIN: 0
BLURRED VISION: 0
FEVER: 0
FLANK PAIN: 0
CONSTIPATION: 0
WEAKNESS: 0
DEPRESSION: 0
FALLS: 0
SORE THROAT: 0
SHORTNESS OF BREATH: 0
VOMITING: 0
COUGH: 0
ABDOMINAL PAIN: 0

## 2023-06-13 ASSESSMENT — PAIN DESCRIPTION - PAIN TYPE: TYPE: ACUTE PAIN

## 2023-06-13 NOTE — CARE PLAN
The patient is Stable - Low risk of patient condition declining or worsening    Shift Goals  Clinical Goals: pain management  Care of newly inserted peg tube  Patient Goals: Pain management  Family Goals: NAIN    Progress made toward(s) clinical / shift goals:  on going    Patient is  Problem: Fall Risk  Goal: Patient will remain free from falls  Outcome: Progressing     Problem: Knowledge Deficit - Standard  Goal: Patient and family/care givers will demonstrate understanding of plan of care, disease process/condition, diagnostic tests and medications  Outcome: Progressing     Problem: Pain - Standard  Goal: Alleviation of pain or a reduction in pain to the patient’s comfort goal  Outcome: Progressing     Problem: Skin Integrity  Goal: Skin integrity is maintained or improved  Outcome: Progressing    progressing towards the following goals:

## 2023-06-13 NOTE — DIETARY
Nutrition Update:    Day 19 of admit.  Mary Martinez is a 53 y.o. female with admitting DX of High anion gap metabolic acidosis [E87.29].  Patient being followed to optimize nutrition.    Current Diet: Level 7 - easy-to-chew; Level 0 - thin liquids w/ nutrition support    Evaluation:   RD re-consulted for TF.   Pt PEG placed 6/12.   Pt continues on texture modified diet. Eating <%.   Bed scale wt fluctuating, remains in stable range.   Labs and meds reviewed.     Recommendations/Plan:  Once cleared to use, give ½ container of Boost Plus via PEG at 1st feeding. Advance each feeding ½ container until goal is reached.  Boost Plus with a goal of 4 per day (1 at meal times +1 at HS) will provide 1440 kcal, 56 grams protein and 739 ml free water per day.  Fluids per MD.     RD following.

## 2023-06-13 NOTE — PROGRESS NOTES
..Gastroenterology Progress Note               Author:  Marilyn Anderson MD Date & Time Created: 6/13/2023 11:02 AM       Patient ID:  Name:             Mary Martinez  YOB: 1970  Age:                 53 y.o.  female  MRN:               [id number]        Medical Decision Making, by Problem:  Active Hospital Problems    Diagnosis     Hypocalcemia [E83.51]     Failure to thrive in adult [R62.7]     Thrush [B37.0]     UTI (urinary tract infection) [N39.0]     Anemia [D64.9]     Bipolar 1 disorder (HCC) [F31.9]     Starvation ketoacidosis [T73.0XXA, E87.29]     Severe protein-calorie malnutrition (HCC) [E43]     SMAS (superior mesenteric artery syndrome) c/b feeding issues (HCC) [K55.1]     Unintentional weight loss [R63.4]     Chronic pain [G89.29]     Hypophosphatemia [E83.39]     High anion gap metabolic acidosis [E87.29]     HASMUKH (acute kidney injury) (HCC) [N17.9]     Hypokalemia [E87.6]     Rheumatoid arthritis (HCC) [M06.9]     Tobacco dependence [F17.200]     Pancreatitis [K85.90]     Hyponatremia [E87.1]            Presenting Chief Complaint:  Consideration for PEG-J tube    Interval History:  Patient is a 53-year-old female with past medical history significant for chronic abdominal pain, failure to thrive, inability to take oral intake secondary to nausea and vomiting who requires a feeding tube for nutrition. Surgery recommended PEG-J.     6/5/2023:  Patient seen and examined  Complaining of dry mouth and pain secondary to iris feeding tube  Urinating and stooling  Electrolytes stable    6/11/2023: patient seen at bedside. NGT in place. But per nursing, NGT clogged and patient has been taking magic cups for nutrition. When NGT was functioning, patient was taking goal feeds of 45ml/hr. Electrolytes stable, no signs of refeeding syndrome. Discussed PEG-J tube with patient and she is agreeable to proceed.     6/12/23:  PEG-JET placed without incident  6/13/23:  Denies abd pain.  No  bleeding at site.  To start tube feeding      Hospital Medications:  Current Facility-Administered Medications   Medication Dose Frequency Provider Last Rate Last Admin    acetaminophen (Tylenol) tablet 650 mg  650 mg Q6HRS PRN Carina Chen M.D.        PARoxetine (PAXIL) tablet 60 mg  60 mg Q EVENING YO AngelesDDee   60 mg at 06/12/23 1632    senna-docusate (PERICOLACE or SENOKOT S) 8.6-50 MG per tablet 2 Tablet  2 Tablet BID Carina Chen M.D.   2 Tablet at 06/13/23 0549    And    polyethylene glycol/lytes (MIRALAX) PACKET 1 Packet  1 Packet QDAY PRN Carina Chen M.D.        And    magnesium hydroxide (MILK OF MAGNESIA) suspension 30 mL  30 mL QDAY PRN Carina Chen M.D.        And    bisacodyl (DULCOLAX) suppository 10 mg  10 mg QDAY PRN Carina Chen M.D.        promethazine (PHENERGAN) tablet 12.5-25 mg  12.5-25 mg Q4HRS PRN Carina Chen M.D.        QUEtiapine (Seroquel) tablet 100 mg  100 mg Q EVENING Carina Chen M.D.   100 mg at 06/12/23 1632    oxyCODONE immediate release (ROXICODONE) tablet 10 mg  10 mg Q4HRS PRN Makeda Prado D.O.   10 mg at 06/13/23 0955    ondansetron (ZOFRAN ODT) dispertab 4 mg  4 mg Q4HRS PRN Carina Chen M.D.        thiamine (Vitamin B-1) tablet 100 mg  100 mg DAILY Carina Chen M.D.   100 mg at 06/13/23 0548    calcium carbonate (Tums) chewable tab 500 mg  500 mg TID Carina Chen M.D.   500 mg at 06/13/23 0549    omeprazole (PRILOSEC) capsule 40 mg  40 mg Nightly Carina Chen M.D.   40 mg at 06/12/23 2141    lidocaine (LIDODERM) 5 % 1 Patch  1 Patch Q24HR Carina Chen M.D.   1 Patch at 06/09/23 1732    lidocaine (XYLOCAINE) 2 % viscous solution 15 mL  15 mL Q3HRS PRN Ba Persaud M.D.   15 mL at 06/03/23 1609    sore throat spray (Chloraseptic) 1 Spray  1 [place] Q2HRS PRN Krysta Arana M.D.   1 Spray at 06/07/23 2045    Pharmacy Consult: Enteral tube insertion - review meds/change route/product selection   PHARMACY TO DOSE Disha Segal M.D.        [Held  "by provider] enoxaparin (Lovenox) inj 40 mg  40 mg DAILY AT 1800 Eloise Chin D.O.   40 mg at 06/11/23 1622    Pharmacy Consult Request ...Pain Management Review 1 Each  1 Each PHARMACY TO DOSE DEEJAY Brown.JIMMY.        ondansetron (ZOFRAN) syringe/vial injection 4 mg  4 mg Q4HRS PRN DEEJAY Brown.O.   4 mg at 06/07/23 1824    promethazine (PHENERGAN) suppository 12.5-25 mg  12.5-25 mg Q4HRS PRN DEEJAY Brown.O.        prochlorperazine (COMPAZINE) injection 5-10 mg  5-10 mg Q4HRS PRN DEEJAY Brown.O.   10 mg at 06/02/23 2212    labetalol (NORMODYNE/TRANDATE) injection 10 mg  10 mg Q4HRS PRN DEEJAY Brown.O.       Last reviewed on 6/12/2023  6:27 AM by Marika Strickland R.N.       Review of Systems:  Review of Systems   Constitutional:  Positive for malaise/fatigue. Negative for chills and fever.   HENT:  Negative for sore throat.         Dry mouth   Eyes:  Negative for blurred vision.   Respiratory:  Negative for cough and shortness of breath.    Cardiovascular:  Negative for chest pain and leg swelling.   Gastrointestinal:  Negative for abdominal pain, blood in stool, constipation, diarrhea, heartburn, melena, nausea and vomiting.   Genitourinary:  Negative for dysuria and flank pain.   Musculoskeletal:  Negative for back pain and falls.   Skin:  Negative for rash.   Neurological:  Negative for dizziness and weakness.   Psychiatric/Behavioral:  Negative for depression. The patient is not nervous/anxious.    All other systems reviewed and are negative.        Vital signs:  Weight/BMI: Body mass index is 15.12 kg/m².  /73   Pulse 72   Temp 36.6 °C (97.8 °F) (Temporal)   Resp 18   Ht 1.6 m (5' 2.99\")   Wt 38.7 kg (85 lb 5.1 oz)   SpO2 98%   Vitals:    06/12/23 1941 06/13/23 0414 06/13/23 0749 06/13/23 0900   BP: 90/46 98/64 111/73    Pulse: 85 76 72    Resp: 18 18 18 18   Temp: 36.1 °C (97 °F) 36.6 °C (97.8 °F)     TempSrc: Temporal Temporal Temporal    SpO2: 96% 96% 98%  "   Weight:       Height:         Oxygen Therapy:  Pulse Oximetry: 98 %, O2 (LPM): 0, O2 Delivery Device: None - Room Air  No intake or output data in the 24 hours ending 06/13/23 1102        Physical Exam:  Physical Exam  Vitals and nursing note reviewed.   Constitutional:       General: She is not in acute distress.     Comments: Frail appearing and cachectic, appears older than stated age   HENT:      Head: Normocephalic and atraumatic.      Nose: Nose normal.      Comments: NGT secured in place     Mouth/Throat:      Mouth: Mucous membranes are moist.      Pharynx: Oropharynx is clear.   Eyes:      General: No scleral icterus.     Extraocular Movements: Extraocular movements intact.      Conjunctiva/sclera: Conjunctivae normal.   Cardiovascular:      Rate and Rhythm: Regular rhythm.   Pulmonary:      Effort: Pulmonary effort is normal. No respiratory distress.   Abdominal:      General: Abdomen is flat. There is no distension.      Palpations: Abdomen is soft.      Tenderness: There is no abdominal tenderness.      Comments: PEG site LUQ clean and dry.  Bumper rotated 180 degrees.   Musculoskeletal:         General: Normal range of motion.      Cervical back: Normal range of motion.      Right lower leg: No edema.      Left lower leg: No edema.   Skin:     General: Skin is warm and dry.      Capillary Refill: Capillary refill takes less than 2 seconds.      Coloration: Skin is not pale.   Neurological:      General: No focal deficit present.      Mental Status: She is alert and oriented to person, place, and time. Mental status is at baseline.   Psychiatric:         Mood and Affect: Mood normal.         Behavior: Behavior normal.         Thought Content: Thought content normal.             Labs:  Recent Labs     06/11/23  0909 06/12/23  0014 06/13/23  0151   SODIUM 138 139 137   POTASSIUM 4.3 4.6 4.3   CHLORIDE 106 109 106   CO2 19* 20 19*   BUN 10 11 8   CREATININE 0.75 0.80 0.81   MAGNESIUM 1.6 2.3 1.6    PHOSPHORUS 4.0 4.1 4.0   CALCIUM 8.3* 8.4* 8.5       Recent Labs     06/11/23  0909 06/12/23  0014 06/12/23  1558 06/13/23 0151   ALTSGPT  --   --   --  6   ASTSGOT  --   --   --  7*   ALKPHOSPHAT  --   --   --  116*   TBILIRUBIN  --   --   --  0.2   PREALBUMIN  --   --  17.0*  --    GLUCOSE 89 83  --  89       Recent Labs     06/13/23 0151   WBC 7.0   NEUTSPOLYS 62.60   LYMPHOCYTES 26.60   MONOCYTES 8.10   EOSINOPHILS 1.40   BASOPHILS 0.90   ASTSGOT 7*   ALTSGPT 6   ALKPHOSPHAT 116*   TBILIRUBIN 0.2       Recent Labs     06/12/23  0014 06/13/23 0151   RBC  --  2.59*   HEMOGLOBIN  --  7.9*   HEMATOCRIT  --  25.1*   PLATELETCT  --  357   PROTHROMBTM 14.7*  --    INR 1.17*  --        Recent Results (from the past 24 hour(s))   CRP Quantitive (Non-Cardiac)    Collection Time: 06/12/23  3:58 PM   Result Value Ref Range    Stat C-Reactive Protein 1.12 (H) 0.00 - 0.75 mg/dL   Prealbumin    Collection Time: 06/12/23  3:58 PM   Result Value Ref Range    Pre-Albumin 17.0 (L) 18.0 - 38.0 mg/dL   CBC WITH DIFFERENTIAL    Collection Time: 06/13/23  1:51 AM   Result Value Ref Range    WBC 7.0 4.8 - 10.8 K/uL    RBC 2.59 (L) 4.20 - 5.40 M/uL    Hemoglobin 7.9 (L) 12.0 - 16.0 g/dL    Hematocrit 25.1 (L) 37.0 - 47.0 %    MCV 96.9 81.4 - 97.8 fL    MCH 30.5 27.0 - 33.0 pg    MCHC 31.5 (L) 32.2 - 35.5 g/dL    RDW 74.3 (H) 35.9 - 50.0 fL    Platelet Count 357 164 - 446 K/uL    MPV 11.4 9.0 - 12.9 fL    Neutrophils-Polys 62.60 44.00 - 72.00 %    Lymphocytes 26.60 22.00 - 41.00 %    Monocytes 8.10 0.00 - 13.40 %    Eosinophils 1.40 0.00 - 6.90 %    Basophils 0.90 0.00 - 1.80 %    Immature Granulocytes 0.40 0.00 - 0.90 %    Nucleated RBC 0.00 0.00 - 0.20 /100 WBC    Neutrophils (Absolute) 4.39 1.82 - 7.42 K/uL    Lymphs (Absolute) 1.87 1.00 - 4.80 K/uL    Monos (Absolute) 0.57 0.00 - 0.85 K/uL    Eos (Absolute) 0.10 0.00 - 0.51 K/uL    Baso (Absolute) 0.06 0.00 - 0.12 K/uL    Immature Granulocytes (abs) 0.03 0.00 - 0.11 K/uL    NRBC  (Absolute) 0.00 K/uL    Anisocytosis 2+ (A)     Microcytosis 2+ (A)    Comp Metabolic Panel    Collection Time: 06/13/23  1:51 AM   Result Value Ref Range    Sodium 137 135 - 145 mmol/L    Potassium 4.3 3.6 - 5.5 mmol/L    Chloride 106 96 - 112 mmol/L    Co2 19 (L) 20 - 33 mmol/L    Anion Gap 12.0 7.0 - 16.0    Glucose 89 65 - 99 mg/dL    Bun 8 8 - 22 mg/dL    Creatinine 0.81 0.50 - 1.40 mg/dL    Calcium 8.5 8.5 - 10.5 mg/dL    AST(SGOT) 7 (L) 12 - 45 U/L    ALT(SGPT) 6 2 - 50 U/L    Alkaline Phosphatase 116 (H) 30 - 99 U/L    Total Bilirubin 0.2 0.1 - 1.5 mg/dL    Albumin 2.7 (L) 3.2 - 4.9 g/dL    Total Protein 6.2 6.0 - 8.2 g/dL    Globulin 3.5 1.9 - 3.5 g/dL    A-G Ratio 0.8 g/dL   MAGNESIUM    Collection Time: 06/13/23  1:51 AM   Result Value Ref Range    Magnesium 1.6 1.5 - 2.5 mg/dL   PHOSPHORUS    Collection Time: 06/13/23  1:51 AM   Result Value Ref Range    Phosphorus 4.0 2.5 - 4.5 mg/dL   CORRECTED CALCIUM    Collection Time: 06/13/23  1:51 AM   Result Value Ref Range    Correct Calcium 9.5 8.5 - 10.5 mg/dL   ESTIMATED GFR    Collection Time: 06/13/23  1:51 AM   Result Value Ref Range    GFR (CKD-EPI) 88 >60 mL/min/1.73 m 2   PLATELET ESTIMATE    Collection Time: 06/13/23  1:51 AM   Result Value Ref Range    Plt Estimation Normal    MORPHOLOGY    Collection Time: 06/13/23  1:51 AM   Result Value Ref Range    RBC Morphology Present    PERIPHERAL SMEAR REVIEW    Collection Time: 06/13/23  1:51 AM   Result Value Ref Range    Peripheral Smear Review see below    DIFFERENTIAL COMMENT    Collection Time: 06/13/23  1:51 AM   Result Value Ref Range    Comments-Diff see below        Radiology Review:  IR-US GUIDED PIV   Final Result    Ultrasound-guided PERIPHERAL IV INSERTION performed by    qualified nursing staff as above.      [id number] VIEW   Final Result   Addendum (preliminary) 1 of 1   There is a feeding tube present with the tip overlying the gastric fundus.      Final      1.  No evidence of bowel  obstruction.      2.  Constipation.      3.  Possible left nephrolith.      DX-ABDOMEN FOR TUBE PLACEMENT   Final Result      Enteric tube tip projects over the stomach.      IR-MIDLINE CATHETER INSERTION WO GUIDANCE > AGE 3   Final Result                  Ultrasound-guided midline placement performed by qualified nursing staff    as above.          DX-ABDOMEN FOR TUBE PLACEMENT   Final Result      Nasogastric tube is now satisfactorily with the stomach.      DX-ABDOMEN FOR TUBE PLACEMENT   Final Result      Nasogastric tube side-port is in the distal esophagus. Recommend advancement.            MDM (Data Review):   -Records reviewed and summarized in current documentation  -I personally reviewed and interpreted the laboratory results  -I personally reviewed the radiology images    Assessment/Recommendations:  Impressions:  S/p PEG with jejunal tube extension  Failure to thrive  Starvation ketoacidosis  Chronic abdominal pain  Severe malnutrition        Recommendations:  Tolerating PO's and to start enteral feeding  2.  Home Health consult may be helpful to arrange for nocturnal continuous tube feeding.  She is unable to give her self bolus feedings and unsure if family will be present during day to assist her.    Signing off.  PLease call if we can assist.    Core Quality Measures   Reviewed items::  Labs, Medications and Radiology reports reviewed

## 2023-06-13 NOTE — DISCHARGE SUMMARY
Discharge Summary    CHIEF COMPLAINT ON ADMISSION  Chief Complaint   Patient presents with    ALOC     Per son, pt has been more confused for the last few days. States has UTI the last time she was here and admitted.        Reason for Admission  ALOC     Admission Date  5/25/2023    CODE STATUS  DNAR/DNI    HPI & HOSPITAL COURSE  This is a 53-year-old female with past medical history of severe RA, recurrent UTIs and recurrent starvation ketosis who was brought to the ED on 5/25/2023 with altered mental status, significant for HASMUKH, UTI.    It appears patient was being evaluated by surgery outpatient for G-tube placement however patient initially refused, now she is amendable.  GI was consulted during this admission, PEG tube was placed on 6/12/2023.  Discussed the case with dietitian, patient can use boost 4 times daily.  Nursing staff to instruct patient on PEG tube maintenance.  Patient has close follow-up with PCP, pain management and GI.    Patient was started on IV fluids, HASMUKH resolved.  She completed antibiotic course for UTI.  All electrolyte abnormalities resolved.    Therefore, she is discharged in good and stable condition to home with close outpatient follow-up.    The patient met 2-midnight criteria for an inpatient stay at the time of discharge.    Discharge Date  6/13/2023    FOLLOW UP ITEMS POST DISCHARGE  Primary care physician  Gastroenterology    DISCHARGE DIAGNOSES  Principal Problem:    Failure to thrive in adult (POA: Yes)  Active Problems:    Hyponatremia (POA: Yes)    Pancreatitis (POA: Yes)    Tobacco dependence (POA: Yes)    Rheumatoid arthritis (HCC) (POA: Yes)      Overview: Hardik update 10/1/2016    High anion gap metabolic acidosis (POA: Yes)    Hypokalemia (POA: Yes)    HASMUKH (acute kidney injury) (HCC) (POA: Yes)    Hypophosphatemia (POA: Yes)    Chronic pain (POA: Yes)    Unintentional weight loss (POA: Yes)    SMAS (superior mesenteric artery syndrome) c/b feeding issues (HCC) (POA:  Yes)    Severe protein-calorie malnutrition (HCC) (POA: Yes)    Starvation ketoacidosis (POA: Yes)    Bipolar 1 disorder (HCC) (POA: Yes)    Anemia (POA: Yes)    Hypocalcemia (POA: Yes)    Thrush (POA: Yes)    UTI (urinary tract infection) (POA: Yes)  Resolved Problems:    * No resolved hospital problems. *      FOLLOW UP  Fidencio Christopher D.O.  [address]  [address]  [facility] [id number] [phone]    Follow up        MEDICATIONS ON DISCHARGE     Medication List        START taking these medications        Instructions   thiamine 100 MG tablet  Start taking on: June 14, 2023  Commonly known as: THIAMINE   Take 1 Tablet by mouth every day for 90 days.  Dose: 100 mg            CONTINUE taking these medications        Instructions   Naloxone 4 MG/0.1ML Liqd  Commonly known as: NARCAN   One spray in one nostril for overdose and call 911.     omeprazole 20 MG delayed-release capsule  Commonly known as: PRILOSEC   Take 1 Capsule by mouth every day.  Dose: 20 mg     oxyCODONE-acetaminophen  MG Tabs  Commonly known as: PERCOCET-10   Take 1 Tablet by mouth every four hours as needed for Severe Pain for up to 7 days.  Dose: 1 Tablet     PARoxetine 30 MG Tabs  Commonly known as: PAXIL   Take 60 mg by mouth every evening. 2 tablet (60 mg)  Dose: 60 mg     QUEtiapine 100 MG Tabs  Commonly known as: Seroquel   Take 1 Tablet by mouth every evening.  Dose: 100 mg              Allergies  Allergies   Allergen Reactions    Penicillins Anaphylaxis and Hives     Tolerates cephalosporins; reports throat swelling with PCN    Aripiprazole Nausea     Spasms, shaking      Nitrous Oxide Vomiting       DIET  Orders Placed This Encounter   Procedures    Diet Order Diet: Level 7 - Easy to Chew; Liquid level: Level 0 - Thin     Standing Status:   Standing     Number of Occurrences:   1     Order Specific Question:   Diet:     Answer:   Level 7 - Easy to Chew [22]     Order Specific Question:   Liquid level     Answer:   Level 0 - Thin     Diet: Diet Tube Feed; Formula: Bolus Only; Select Bolus (If Indicated): Boost Plus Carton; Bolus Frequency: QID (1 at meals + 1 at HS); Number of Cartons Per Day: Four     Once cleared to use PEG, give ½ container at 1st feeding. Advance each feeding ½ container until goal is reached.     Standing Status:   Standing     Number of Occurrences:   1     Order Specific Question:   Diet     Answer:   Diet Tube Feed [35]     Order Specific Question:   Formula:     Answer:   Bolus Only     Order Specific Question:   Route     Answer:   Gtube/PEG     Order Specific Question:   Select Bolus (If Indicated):     Answer:   Boost Plus Carton     Order Specific Question:   Bolus Frequency     Answer:   QID     Comments:   1 at meals + 1 at HS     Order Specific Question:   Number of Cartons Per Day:     Answer:   Four       ACTIVITY  As tolerated.  Weight bearing as tolerated    CONSULTATIONS  General surgery  GI    PROCEDURES  PEG tube    LABORATORY  Lab Results   Component Value Date    SODIUM 137 06/13/2023    POTASSIUM 4.3 06/13/2023    CHLORIDE 106 06/13/2023    CO2 19 (L) 06/13/2023    GLUCOSE 89 06/13/2023    BUN 8 06/13/2023    CREATININE 0.81 06/13/2023    CREATININE 0.7 11/29/2008        Lab Results   Component Value Date    WBC 7.0 06/13/2023    HEMOGLOBIN 7.9 (L) 06/13/2023    HEMATOCRIT 25.1 (L) 06/13/2023    PLATELETCT 357 06/13/2023      IR-US GUIDED PIV   Final Result    Ultrasound-guided PERIPHERAL IV INSERTION performed by    qualified nursing staff as above.      [id number] VIEW   Final Result   Addendum (preliminary) 1 of 1   There is a feeding tube present with the tip overlying the gastric fundus.      Final      1.  No evidence of bowel obstruction.      2.  Constipation.      3.  Possible left nephrolith.      DX-ABDOMEN FOR TUBE PLACEMENT   Final Result      Enteric tube tip projects over the stomach.      IR-MIDLINE CATHETER INSERTION WO GUIDANCE > AGE 3   Final Result                   Ultrasound-guided midline placement performed by qualified nursing staff    as above.          DX-ABDOMEN FOR TUBE PLACEMENT   Final Result      Nasogastric tube is now satisfactorily with the stomach.      DX-ABDOMEN FOR TUBE PLACEMENT   Final Result      Nasogastric tube side-port is in the distal esophagus. Recommend advancement.         Total time of the discharge process exceeds 45 minutes.

## 2023-06-13 NOTE — PROGRESS NOTES
Pt arrived to the [facility] without a PIV. Armband removed. Discussed dc instructions including medications, and follow-up appointments.

## 2023-06-26 ENCOUNTER — APPOINTMENT (OUTPATIENT)
Dept: RADIOLOGY | Facility: MEDICAL CENTER | Age: 53
End: 2023-06-26
Attending: EMERGENCY MEDICINE
Payer: MEDICAID

## 2023-06-26 ENCOUNTER — HOSPITAL ENCOUNTER (EMERGENCY)
Facility: MEDICAL CENTER | Age: 53
End: 2023-06-26
Attending: EMERGENCY MEDICINE
Payer: MEDICAID

## 2023-06-26 VITALS
WEIGHT: 93.92 LBS | SYSTOLIC BLOOD PRESSURE: 115 MMHG | BODY MASS INDEX: 16.64 KG/M2 | OXYGEN SATURATION: 97 % | HEART RATE: 93 BPM | RESPIRATION RATE: 16 BRPM | HEIGHT: 63 IN | DIASTOLIC BLOOD PRESSURE: 79 MMHG | TEMPERATURE: 98.1 F

## 2023-06-26 DIAGNOSIS — R11.2 NAUSEA AND VOMITING, UNSPECIFIED VOMITING TYPE: ICD-10-CM

## 2023-06-26 DIAGNOSIS — R10.30 LOWER ABDOMINAL PAIN: ICD-10-CM

## 2023-06-26 LAB
ALBUMIN SERPL BCP-MCNC: 3.3 G/DL (ref 3.2–4.9)
ALBUMIN/GLOB SERPL: 0.9 G/DL
ALP SERPL-CCNC: 168 U/L (ref 30–99)
ALT SERPL-CCNC: 5 U/L (ref 2–50)
ANION GAP SERPL CALC-SCNC: 13 MMOL/L (ref 7–16)
ANISOCYTOSIS BLD QL SMEAR: ABNORMAL
APPEARANCE UR: CLEAR
AST SERPL-CCNC: 6 U/L (ref 12–45)
BASOPHILS # BLD AUTO: 0.9 % (ref 0–1.8)
BASOPHILS # BLD: 0.08 K/UL (ref 0–0.12)
BILIRUB SERPL-MCNC: <0.2 MG/DL (ref 0.1–1.5)
BILIRUB UR QL STRIP.AUTO: NEGATIVE
BUN SERPL-MCNC: 8 MG/DL (ref 8–22)
CALCIUM ALBUM COR SERPL-MCNC: 9.1 MG/DL (ref 8.5–10.5)
CALCIUM SERPL-MCNC: 8.5 MG/DL (ref 8.5–10.5)
CHLORIDE SERPL-SCNC: 109 MMOL/L (ref 96–112)
CO2 SERPL-SCNC: 15 MMOL/L (ref 20–33)
COLOR UR: YELLOW
COMMENT 1642: NORMAL
CREAT SERPL-MCNC: 0.73 MG/DL (ref 0.5–1.4)
EKG IMPRESSION: NORMAL
EKG IMPRESSION: NORMAL
EOSINOPHIL # BLD AUTO: 0.26 K/UL (ref 0–0.51)
EOSINOPHIL NFR BLD: 3.1 % (ref 0–6.9)
ERYTHROCYTE [DISTWIDTH] IN BLOOD BY AUTOMATED COUNT: 69.5 FL (ref 35.9–50)
GFR SERPLBLD CREATININE-BSD FMLA CKD-EPI: 99 ML/MIN/1.73 M 2
GLOBULIN SER CALC-MCNC: 3.7 G/DL (ref 1.9–3.5)
GLUCOSE SERPL-MCNC: 96 MG/DL (ref 65–99)
GLUCOSE UR STRIP.AUTO-MCNC: NEGATIVE MG/DL
HCG SERPL QL: NEGATIVE
HCT VFR BLD AUTO: 33 % (ref 37–47)
HGB BLD-MCNC: 10.8 G/DL (ref 12–16)
IMM GRANULOCYTES # BLD AUTO: 0.03 K/UL (ref 0–0.11)
IMM GRANULOCYTES NFR BLD AUTO: 0.4 % (ref 0–0.9)
KETONES UR STRIP.AUTO-MCNC: NEGATIVE MG/DL
LEUKOCYTE ESTERASE UR QL STRIP.AUTO: NEGATIVE
LG PLATELETS BLD QL SMEAR: NORMAL
LIPASE SERPL-CCNC: 156 U/L (ref 11–82)
LYMPHOCYTES # BLD AUTO: 2.11 K/UL (ref 1–4.8)
LYMPHOCYTES NFR BLD: 24.8 % (ref 22–41)
MACROCYTES BLD QL SMEAR: ABNORMAL
MCH RBC QN AUTO: 32 PG (ref 27–33)
MCHC RBC AUTO-ENTMCNC: 32.7 G/DL (ref 32.2–35.5)
MCV RBC AUTO: 97.9 FL (ref 81.4–97.8)
MICRO URNS: NORMAL
MICROCYTES BLD QL SMEAR: ABNORMAL
MONOCYTES # BLD AUTO: 0.52 K/UL (ref 0–0.85)
MONOCYTES NFR BLD AUTO: 6.1 % (ref 0–13.4)
MORPHOLOGY BLD-IMP: NORMAL
NEUTROPHILS # BLD AUTO: 5.5 K/UL (ref 1.82–7.42)
NEUTROPHILS NFR BLD: 64.7 % (ref 44–72)
NITRITE UR QL STRIP.AUTO: NEGATIVE
NRBC # BLD AUTO: 0 K/UL
NRBC BLD-RTO: 0 /100 WBC (ref 0–0.2)
OVALOCYTES BLD QL SMEAR: NORMAL
PH UR STRIP.AUTO: 7 [PH] (ref 5–8)
PLATELET # BLD AUTO: 370 K/UL (ref 164–446)
PLATELET BLD QL SMEAR: NORMAL
PMV BLD AUTO: 10.9 FL (ref 9–12.9)
POIKILOCYTOSIS BLD QL SMEAR: NORMAL
POTASSIUM SERPL-SCNC: 4.2 MMOL/L (ref 3.6–5.5)
PROT SERPL-MCNC: 7 G/DL (ref 6–8.2)
PROT UR QL STRIP: NEGATIVE MG/DL
RBC # BLD AUTO: 3.37 M/UL (ref 4.2–5.4)
RBC BLD AUTO: PRESENT
RBC UR QL AUTO: NEGATIVE
SODIUM SERPL-SCNC: 137 MMOL/L (ref 135–145)
SP GR UR STRIP.AUTO: 1.03
UROBILINOGEN UR STRIP.AUTO-MCNC: 0.2 MG/DL
WBC # BLD AUTO: 8.5 K/UL (ref 4.8–10.8)

## 2023-06-26 PROCEDURE — 93005 ELECTROCARDIOGRAM TRACING: CPT | Performed by: EMERGENCY MEDICINE

## 2023-06-26 PROCEDURE — 99285 EMERGENCY DEPT VISIT HI MDM: CPT

## 2023-06-26 PROCEDURE — 93005 ELECTROCARDIOGRAM TRACING: CPT

## 2023-06-26 PROCEDURE — 700117 HCHG RX CONTRAST REV CODE 255: Mod: UD | Performed by: EMERGENCY MEDICINE

## 2023-06-26 PROCEDURE — 74177 CT ABD & PELVIS W/CONTRAST: CPT

## 2023-06-26 PROCEDURE — 80053 COMPREHEN METABOLIC PANEL: CPT

## 2023-06-26 PROCEDURE — 96374 THER/PROPH/DIAG INJ IV PUSH: CPT | Mod: XU

## 2023-06-26 PROCEDURE — 81003 URINALYSIS AUTO W/O SCOPE: CPT

## 2023-06-26 PROCEDURE — 96375 TX/PRO/DX INJ NEW DRUG ADDON: CPT

## 2023-06-26 PROCEDURE — 83690 ASSAY OF LIPASE: CPT

## 2023-06-26 PROCEDURE — 84703 CHORIONIC GONADOTROPIN ASSAY: CPT

## 2023-06-26 PROCEDURE — 700111 HCHG RX REV CODE 636 W/ 250 OVERRIDE (IP): Mod: UD | Performed by: EMERGENCY MEDICINE

## 2023-06-26 PROCEDURE — 85025 COMPLETE CBC W/AUTO DIFF WBC: CPT

## 2023-06-26 PROCEDURE — 36415 COLL VENOUS BLD VENIPUNCTURE: CPT

## 2023-06-26 RX ORDER — ONDANSETRON 2 MG/ML
4 INJECTION INTRAMUSCULAR; INTRAVENOUS ONCE
Status: COMPLETED | OUTPATIENT
Start: 2023-06-26 | End: 2023-06-26

## 2023-06-26 RX ORDER — MORPHINE SULFATE 4 MG/ML
4 INJECTION INTRAVENOUS ONCE
Status: COMPLETED | OUTPATIENT
Start: 2023-06-26 | End: 2023-06-26

## 2023-06-26 RX ORDER — ONDANSETRON 4 MG/1
4 TABLET, ORALLY DISINTEGRATING ORAL EVERY 6 HOURS PRN
Qty: 5 TABLET | Refills: 0 | Status: SHIPPED | OUTPATIENT
Start: 2023-06-26 | End: 2023-08-14

## 2023-06-26 RX ADMIN — MORPHINE SULFATE 4 MG: 4 INJECTION, SOLUTION INTRAMUSCULAR; INTRAVENOUS at 16:09

## 2023-06-26 RX ADMIN — ONDANSETRON 4 MG: 2 INJECTION INTRAMUSCULAR; INTRAVENOUS at 16:09

## 2023-06-26 RX ADMIN — IOHEXOL 100 ML: 350 INJECTION, SOLUTION INTRAVENOUS at 18:20

## 2023-06-26 ASSESSMENT — LIFESTYLE VARIABLES: DO YOU DRINK ALCOHOL: NO

## 2023-06-26 ASSESSMENT — FIBROSIS 4 INDEX: FIB4 SCORE: .4082482904638630164

## 2023-06-26 NOTE — ED PROVIDER NOTES
ED Provider Note    CHIEF COMPLAINT  Chief Complaint   Patient presents with    Abdominal Pain     Pt had a feeding tube placed two weeks. Per pt, tube has been flushing. Pt states she started having abdominal pain today and when she tried to give herself boost today, she had increased pain.      N/V     Starting today. Pt denies bloody or coffee ground emesis.         HPI    Primary care provider: Fidencio Christopher D.O.   History obtained from: Patient  History limited by: None     Mary Martinez is a 53 y.o. female who presents to the ED complaining of pain across the lower abdomen since waking up this morning.  She reports 4 episodes of nausea and vomiting without gross blood noted and also several episodes of dry heaves.  Patient had recent hospitalization and PEG tube placement.  She reports using the PEG tube without any difficulty and has been feeding herself with Boost.  Patient reports that she does have increased abdominal pain after feeding herself.  She denies fever/chest pain/shortness of breath or difficulty breathing/cough.  She reports normal bowel movements today and denies hematochezia or melena.  She denies dysuria or hematuria.  Denies possibility of pregnancy.  No new rash noted.  No edema.    REVIEW OF SYSTEMS  Please see HPI for pertinent positives/negatives.  All other systems reviewed and are negative.     PAST MEDICAL HISTORY  Past Medical History:   Diagnosis Date    Chronic pain 04/24/2020    Due to RA    Pneumonia 10/2019    Rheumatoid arthritis(714.0) 2003    Acute renal failure (ARF) (HCC)     Allergy     Anemia     Anxiety     Arthritis     Rheumatoid -follows with rheumatologist. Has several fractures due to RA.    ASTHMA     inhalers prn    Blood transfusion without reported diagnosis     Bowel habit changes     4/24/20-constipation and diarrhea.    Bronchitis last approx 2018    Dental disorder     no teeth upper-does not  wear her denture. Broken teeth on bottom.    Depression     GERD (gastroesophageal reflux disease)     GIB (gastrointestinal bleeding)     Head ache     Heart burn     taking famotidine    Hiatus hernia syndrome     History of pancreatitis     Hypokalemia     Hyponatremia     Idiopathic chronic pancreatitis (HCC)     Indigestion     taking famotidine    Intractable nausea and vomiting     Kidney disease     Pain     CPS-everywhere    PONV (postoperative nausea and vomiting)     Psychiatric problem     anxiety and depression    Rheumatoid aortitis     SMAS (superior mesenteric artery syndrome) (HCC)     Substance abuse (HCC)         SURGICAL HISTORY  Past Surgical History:   Procedure Laterality Date    AL PLACE PERCUT GASTROSTOMY TUBE N/A 6/12/2023    Procedure: INSERTION, PEG TUBE - PEG AND J TUBE PLACEMENT;  Surgeon: Marilyn Anderson M.D.;  Location: SURGERY SAME DAY [facility];  Service: Gastroenterology    AL UPPER GI ENDOSCOPY,DIAGNOSIS  6/12/2023    Procedure: GASTROSCOPY;  Surgeon: Marilyn Anderson M.D.;  Location: SURGERY SAME DAY [facility];  Service: Gastroenterology    AL UPPER GI ENDOSCOPY,DIAGNOSIS N/A 9/9/2022    Procedure: ENDOSCOPIC ULTRASOUND- UPPER;  Surgeon: Jorge Brooke M.D.;  Location: [facility];  Service: EUS    EGD W/ENDOSCOPIC ULTRASOUND N/A 9/9/2022    Procedure: EGD, WITH ENDOSCOPIC US;  Surgeon: Jorge Brooke M.D.;  Location: [facility];  Service: EUS    AL ENDOSCOPIC US EXAM, ESOPH  4/29/2020    Procedure: EGD, WITH ENDOSCOPIC US;  Surgeon: Jorge Brooke M.D.;  Location: [facility];  Service: Gastroenterology    GASTROSCOPY-ENDO  4/29/2020    Procedure: GASTROSCOPY;  Surgeon: Jorge Brooke M.D.;  Location: [facility];  Service: Gastroenterology    EGD WITH ASP/BX  4/29/2020    Procedure: EGD, WITH ASPIRATION BIOPSY - POSS FNA;  Surgeon: Jogre Brooke M.D.;  Location: [facility];   Service: Gastroenterology    VA EXPLORATORY OF ABDOMEN N/A 10/4/2019    Procedure: LAPAROTOMY, EXPLORATORY AND REPAIR OF DUODENAL PERFORATION;  Surgeon: James Dumont M.D.;  Location: SURGERY [facility];  Service: General    COLONOSCOPY  2018    with upper endoscopy    FINGER ARTHROPLASTY Right 6/5/2017    Procedure: FINGER ARTHROPLASTY - LONG, RING AND SMALL VOLAR PLATE;  Surgeon: Lobo Rosen M.D.;  Location: SURGERY SAME DAY [facility];  Service:     FINGER AMPUTATION  6/5/2017    Procedure: FINGER AMPUTATION - LONG, RING AND SMALL AT THE PROXIMAL INTERPHALANGEAL JOINT;  Surgeon: Lobo Rosen M.D.;  Location: SURGERY SAME DAY [facility];  Service:     FINGER ARTHROPLASTY Right 4/17/2017    Procedure: FINGER ARTHROPLASTY ;  Surgeon: Lobo Rosen M.D.;  Location: SURGERY SAME DAY [facility];  Service:     FINGER AMPUTATION Right 9/14/2016    Procedure: FINGER AMPUTATION INDEX;  Surgeon: Lobo Rosen M.D.;  Location: SURGERY SAME DAY [facility];  Service:     IRRIGATION & DEBRIDEMENT ORTHO Right 9/11/2016    Procedure: IRRIGATION & DEBRIDEMENT ORTHO - right index finger;  Surgeon: Madhu Rosen M.D.;  Location: [facility];  Service:     FUSION, PIP JOINT, TOE Right 8/29/2016    Procedure: RE-DO INDEX FINGER PROXIMAL INTERPHALANGEAL ARTHRODESIS;  Surgeon: Lobo Rosen M.D.;  Location: SURGERY SAME DAY [facility];  Service:     BONE GRAFT Right 8/29/2016    Procedure: BONE GRAFT - DISTAL RADIUS ;  Surgeon: Lobo Rosen M.D.;  Location: SURGERY SAME DAY [facility];  Service:     ARTHRODESIS Right 5/6/2016    Procedure: ARTHRODESIS INDEX FINGER PROXIMAL INTERPHALANGEAL;  Surgeon: Lobo Rosen M.D.;  Location: SURGERY SAME DAY [facility];  Service:     FOOT RECONSTRUCTION RHEUMATIC Left 7/29/2015    Procedure: FOOT RECONSTRUCTION RHEUMATIC;  Surgeon: Heriberto Alves M.D.;  Location: [facility];  Service:      TOE FUSION Right 5/27/2015    Procedure: TOE FUSION 1ST METATARSALPHALANGEAL JOINT;  Surgeon: Heriberto Alves M.D.;  Location: SURGERY [facility];  Service:     BONE SPUR EXCISION  5/27/2015    Procedure: BONE SPUR EXCISION METATARSAL HEAD 2-3;  Surgeon: Heriberto Alves M.D.;  Location: SURGERY [facility];  Service:     HAMMERTOE CORRECTION  5/27/2015    Procedure: HAMMERTOE CORRECTION AND SOFT TISSUE RE-ALINGMENT 2-3 ;  Surgeon: Heriberto Alves M.D.;  Location: SURGERY [facility];  Service:     DENTAL EXTRACTION(S)  2014    all of upper teeth    ABDOMINAL ABSCESS DRAINAGE  9/27/2011    Performed by VERO WRIGHT at SURGERY [facility]    EMANUEL BY LAPAROSCOPY  9/27/2011    Performed by VERO WRIGHT at SURGERY [facility]    APPENDECTOMY  2011    Found out it had ruptured prior to abcess surgery    REPEAT C SECTION  2008    REPEAT C SECTION  2005    REPEAT C SECTION  1999    PRIMARY C SECTION  1991    TONSILLECTOMY  1982    WRIST EXPLORATION Left 1980's    fractured wrist-no hardware        SOCIAL HISTORY  Social History     Tobacco Use    Smoking status: Every Day     Packs/day: 0.50     Years: 30.00     Pack years: 15.00     Types: Cigarettes    Smokeless tobacco: Never   Vaping Use    Vaping Use: Never used   Substance and Sexual Activity    Alcohol use: Never    Drug use: Never    Sexual activity: Not Currently        FAMILY HISTORY  Family History   Problem Relation Age of Onset    Cancer Mother     Heart Disease Mother     Hypertension Mother     Heart Disease Father     Hypertension Father         CURRENT MEDICATIONS  Home Medications       Reviewed by Dorina Arias R.N. (Registered Nurse) on 06/26/23 at 1305  Med List Status: Not Addressed     Medication Last Dose Status   Naloxone (NARCAN) 4 MG/0.1ML Liquid  Active   omeprazole (PRILOSEC) 20 MG delayed-release capsule  Active   PARoxetine (PAXIL) 30 MG Tab  Active   QUEtiapine (SEROQUEL) 100 MG Tab  Active   thiamine  "(THIAMINE) 100 MG tablet  Active                     ALLERGIES  Allergies   Allergen Reactions    Penicillins Anaphylaxis and Hives     Tolerates cephalosporins; reports throat swelling with PCN    Aripiprazole Nausea     Spasms, shaking      Nitrous Oxide Vomiting        PHYSICAL EXAM  VITAL SIGNS: /79   Pulse 93   Temp 36.7 °C (98.1 °F) (Temporal)   Resp 16   Ht 1.6 m (5' 3\")   Wt 42.6 kg (93 lb 14.7 oz)   LMP 09/21/2011   SpO2 97%   BMI 16.64 kg/m²  @CONSTANCE[[id number]::@     Pulse ox interpretation: 100% I interpret this pulse ox as normal     Constitutional: Thin patient, alert in no apparent distress, nontoxic appearance    HENT: No external signs of trauma, normocephalic  Eyes: PERRL, conjunctiva without erythema, no discharge, no icterus    Neck: Soft and supple, trachea midline, no stridor, no tenderness, no LAD, no JVD, good ROM    Cardiovascular: Regular rate and rhythm, no murmurs/rubs/gallops, strong distal pulses and good perfusion    Thorax & Lungs: No respiratory distress, CTAB    Abdomen: Soft, PEG tube in place without apparent signs of infection externally, mild tenderness across lower abdomen, nondistended, no guarding, no rebound, normal BS    Back: No CVAT     Extremities: No cyanosis, no edema, no gross deformity, good ROM, no tenderness, intact distal pulses with brisk cap refill    Skin: Warm, dry, no pallor/cyanosis, no rash noted      Neuro: A/O times 3, no focal deficits noted    Psychiatric: Cooperative, normal mood and affect, normal judgement, appropriate for clinical situation        DIAGNOSTIC STUDIES / PROCEDURES    EKG  12 Lead EKG obtained at 1300 and interpreted by me:   Rate: 105   Rhythm: Sinus tachycardia  Ectopy: None  Intervals: Normal   Axis: Normal   QRS: Low voltage, late precordial R wave transition  ST segments: Normal  T Waves: Normal    Clinical Impression: Sinus tachycardia without acute ischemic changes or other dysrhythmia  Compared to June 6, 2023 without " significant change      LABS  All labs reviewed by me.     Results for orders placed or performed during the hospital encounter of 06/26/23   CBC WITH DIFFERENTIAL   Result Value Ref Range    WBC 8.5 4.8 - 10.8 K/uL    RBC 3.37 (L) 4.20 - 5.40 M/uL    Hemoglobin 10.8 (L) 12.0 - 16.0 g/dL    Hematocrit 33.0 (L) 37.0 - 47.0 %    MCV 97.9 (H) 81.4 - 97.8 fL    MCH 32.0 27.0 - 33.0 pg    MCHC 32.7 32.2 - 35.5 g/dL    RDW 69.5 (H) 35.9 - 50.0 fL    Platelet Count 370 164 - 446 K/uL    MPV 10.9 9.0 - 12.9 fL    Neutrophils-Polys 64.70 44.00 - 72.00 %    Lymphocytes 24.80 22.00 - 41.00 %    Monocytes 6.10 0.00 - 13.40 %    Eosinophils 3.10 0.00 - 6.90 %    Basophils 0.90 0.00 - 1.80 %    Immature Granulocytes 0.40 0.00 - 0.90 %    Nucleated RBC 0.00 0.00 - 0.20 /100 WBC    Neutrophils (Absolute) 5.50 1.82 - 7.42 K/uL    Lymphs (Absolute) 2.11 1.00 - 4.80 K/uL    Monos (Absolute) 0.52 0.00 - 0.85 K/uL    Eos (Absolute) 0.26 0.00 - 0.51 K/uL    Baso (Absolute) 0.08 0.00 - 0.12 K/uL    Immature Granulocytes (abs) 0.03 0.00 - 0.11 K/uL    NRBC (Absolute) 0.00 K/uL    Anisocytosis 1+     Macrocytosis 1+     Microcytosis 1+    COMP METABOLIC PANEL   Result Value Ref Range    Sodium 137 135 - 145 mmol/L    Potassium 4.2 3.6 - 5.5 mmol/L    Chloride 109 96 - 112 mmol/L    Co2 15 (L) 20 - 33 mmol/L    Anion Gap 13.0 7.0 - 16.0    Glucose 96 65 - 99 mg/dL    Bun 8 8 - 22 mg/dL    Creatinine 0.73 0.50 - 1.40 mg/dL    Calcium 8.5 8.5 - 10.5 mg/dL    AST(SGOT) 6 (L) 12 - 45 U/L    ALT(SGPT) 5 2 - 50 U/L    Alkaline Phosphatase 168 (H) 30 - 99 U/L    Total Bilirubin <0.2 0.1 - 1.5 mg/dL    Albumin 3.3 3.2 - 4.9 g/dL    Total Protein 7.0 6.0 - 8.2 g/dL    Globulin 3.7 (H) 1.9 - 3.5 g/dL    A-G Ratio 0.9 g/dL   LIPASE   Result Value Ref Range    Lipase 156 (H) 11 - 82 U/L   HCG QUAL SERUM   Result Value Ref Range    Beta-Hcg Qualitative Serum Negative Negative   URINALYSIS,CULTURE IF INDICATED    Specimen: Urine   Result Value Ref Range     Color Yellow     Character Clear     Specific Gravity 1.027 <1.035    Ph 7.0 5.0 - 8.0    Glucose Negative Negative mg/dL    Ketones Negative Negative mg/dL    Protein Negative Negative mg/dL    Bilirubin Negative Negative    Urobilinogen, Urine 0.2 Negative    Nitrite Negative Negative    Leukocyte Esterase Negative Negative    Occult Blood Negative Negative    Micro Urine Req see below    PLATELET ESTIMATE   Result Value Ref Range    Plt Estimation Normal    MORPHOLOGY   Result Value Ref Range    RBC Morphology Present     Large Platelets 1+     Poikilocytosis 1+     Ovalocytes 1+    PERIPHERAL SMEAR REVIEW   Result Value Ref Range    Peripheral Smear Review see below    DIFFERENTIAL COMMENT   Result Value Ref Range    Comments-Diff see below    CORRECTED CALCIUM   Result Value Ref Range    Correct Calcium 9.1 8.5 - 10.5 mg/dL   ESTIMATED GFR   Result Value Ref Range    GFR (CKD-EPI) 99 >60 mL/min/1.73 m 2   EKG   Result Value Ref Range    Report       [facility] Emergency Dept.    Test Date:  2023  Pt Name:    STEFFANIE ELLISON                Department: ER  MRN:        [id number]                      Room:  Gender:     Female                       Technician: [id number]  :        1970                   Requested By:ER TRIAGE PROTOCOL  Order #:    [id number]                    Reading MD:    Measurements  Intervals                                Axis  Rate:       105                          P:          79  PA:         156                          QRS:        85  QRSD:       92                           T:          44  QT:         335  QTc:        443    Interpretive Statements  Sinus tachycardia  Right atrial enlargement  Pattern suggests chronic pulmonary disease  Consider anterior infarct  Compared to ECG 2023 16:56:55  Atrial abnormality now present  Myocardial infarct finding now present  Sinus rhythm no longer present          RADIOLOGY  I have independently interpreted the  diagnostic imaging associated with this visit and am waiting the final reading from the radiologist.     CT-ABDOMEN-PELVIS WITH   Final Result      1.  Increased small bowel fluid without evidence of obstruction, suggest gastroenteritis.   2.  Gastrojejunostomy tube tip at the DJ flexure.   3.  Increased colonic stool.   4.  No focal mesenteric inflammatory process.             COURSE & MEDICAL DECISION MAKING  Nursing notes, VS, PMSFHx reviewed in chart.     Review of past medical records shows the patient was last admitted at this hospital on May 25, 2023 for HASMUKH and UTI and had PEG tube placement during her hospitalization and patient was discharged on June 13, 2023.      Differential diagnoses considered include but are not limited to: Appendicitis, bowel perforation/obstruction, ileus, cholecystitis/ascending cholangitis, gallstone/biliary colic, diverticulitis, pancreatitis, PUD, gastritis, GERD, gastroenteritis, colitis, muscle strain, hernia      ED Observation Status? Yes; I am placing the patient in to an observation status due to a diagnostic uncertainty as well as therapeutic intensity. Patient placed in observation status at 3:29 PM, 6/26/2023.     Observation plan is as follows: We will obtain laboratory and imaging studies and monitor patient in the ED.    Upon Reevaluation, the patient's condition has: Improved; and will be discharged.    Patient discharged from ED Observation status at 2005 on June 26, 2023.       INITIAL ASSESSMENT AND PLAN  Care Narrative: This is a 53-year-old female patient with past medical history including rheumatoid arthritis, asthma, depression, anxiety, chronic pancreatitis, superior mesenteric artery syndrome with recent hospitalization and PEG tube placement who presents complaining of lower abdominal pain with nausea and vomiting since this morning.  We will obtain laboratory and imaging studies and closely monitor patient in the ED.  Patient will be treated with nausea  and pain medicine.      Discussion of management with other [facility] or appropriate source(s): None     Escalation of care considered, and ultimately not performed: acute inpatient care management, however at this time, the patient is most appropriate for outpatient management.     Decision tools and prescription drugs considered including, but not limited to: Pain Medications   .        History and physical exam as above.  Laboratory testing shows anemia improved compared to her recent results.  Patient with lower bicarb level and increased alk phos and lipase level.  No evidence for acidosis or significant electrolyte derangement.  Patient without upper abdominal pain to suggest acute pancreatitis.  UA without overt signs of infection.  CT abdomen/pelvis with findings as above.  Patient received Zofran and morphine in the ED and subsequently tolerated oral fluids without vomiting.  I discussed the findings with the patient.  Patient is alert, pleasant, in no acute distress and nontoxic in appearance and without evidence for acute abdomen on recheck.  At this time, no convincing evidence for emergent pathology or indications for admission.  She was advised on outpatient follow-up and given return to ED precautions.  Patient will be prescribed Zofran to use as needed.  She verbalized understanding and agreed with plan of care with no further questions or concerns.      The patient is referred to a primary physician for blood pressure management, diabetic screening, and for all other preventative health concerns.       FINAL IMPRESSION  1. Lower abdominal pain Acute   2. Nausea and vomiting, unspecified vomiting type Acute          DISPOSITION  Patient will be discharged home in stable condition.       FOLLOW UP  Fidencio Christopher D.O.  [address] [facility] [id number] [phone]    Call in 1 day      [facility], Emergency Dept  [address] [facility] [address] [phone]    If symptoms  worsen         OUTPATIENT MEDICATIONS  Discharge Medication List as of 6/26/2023  8:11 PM        START taking these medications    Details   ondansetron (ZOFRAN ODT) 4 MG TABLET DISPERSIBLE Take 1 Tablet by mouth every 6 hours as needed for Nausea/Vomiting., Disp-5 Tablet, R-0, Print Rx Paper                Electronically signed by: Dakotah Gooden D.O., 6/26/2023 3:29 PM      Portions of this record were made with voice recognition software.  Despite my review, spelling/grammar/context errors may still remain.  Interpretation of this chart should be taken in this context.

## 2023-06-26 NOTE — ED TRIAGE NOTES
"Chief Complaint   Patient presents with    Abdominal Pain     Pt had a feeding tube placed two weeks. Per pt, tube has been flushing. Pt states she started having abdominal pain today and when she tried to give herself boost today, she had increased pain.      N/V     Starting today. Pt denies bloody or coffee ground emesis.      /77   Pulse (!) 123   Temp 37.2 °C (98.9 °F) (Temporal)   Resp 16   Ht 1.6 m (5' 3\")   Wt 42.6 kg (93 lb 14.7 oz)   LMP 09/21/2011   SpO2 98%   BMI 16.64 kg/m²     Pt in WC. Pt's feeding tube site has slight drainage but no redness present. Abdominal pain protocol ordered.     Pt is alert and oriented, speaking in full sentences, follows commands and responds appropriately to questions. Resp are even and unlabored.      Pt placed in lobby. Pt educated on triage process. Pt encouraged to alert staff for any changes.     Patient and staff wearing appropriate PPE    "

## 2023-06-27 ENCOUNTER — OFFICE VISIT (OUTPATIENT)
Dept: PHYSICAL MEDICINE AND REHAB | Facility: MEDICAL CENTER | Age: 53
End: 2023-06-27
Payer: MEDICAID

## 2023-06-27 VITALS
HEIGHT: 63 IN | DIASTOLIC BLOOD PRESSURE: 58 MMHG | OXYGEN SATURATION: 99 % | TEMPERATURE: 98.8 F | WEIGHT: 101.41 LBS | SYSTOLIC BLOOD PRESSURE: 96 MMHG | BODY MASS INDEX: 17.97 KG/M2 | HEART RATE: 115 BPM

## 2023-06-27 DIAGNOSIS — M19.019 ARTHRITIS OF GLENOHUMERAL JOINT: ICD-10-CM

## 2023-06-27 DIAGNOSIS — Z91.81 AT RISK FOR FALLS: ICD-10-CM

## 2023-06-27 DIAGNOSIS — F11.90 CHRONIC, CONTINUOUS USE OF OPIOIDS: ICD-10-CM

## 2023-06-27 DIAGNOSIS — M05.79 RHEUMATOID ARTHRITIS INVOLVING MULTIPLE SITES WITH POSITIVE RHEUMATOID FACTOR (HCC): ICD-10-CM

## 2023-06-27 DIAGNOSIS — R62.7 FAILURE TO THRIVE IN ADULT: ICD-10-CM

## 2023-06-27 DIAGNOSIS — G89.4 CHRONIC PAIN SYNDROME: ICD-10-CM

## 2023-06-27 DIAGNOSIS — F32.A DEPRESSION, UNSPECIFIED DEPRESSION TYPE: ICD-10-CM

## 2023-06-27 PROCEDURE — 3074F SYST BP LT 130 MM HG: CPT | Performed by: PHYSICAL MEDICINE & REHABILITATION

## 2023-06-27 PROCEDURE — 3078F DIAST BP <80 MM HG: CPT | Performed by: PHYSICAL MEDICINE & REHABILITATION

## 2023-06-27 PROCEDURE — 99215 OFFICE O/P EST HI 40 MIN: CPT | Performed by: PHYSICAL MEDICINE & REHABILITATION

## 2023-06-27 PROCEDURE — 1125F AMNT PAIN NOTED PAIN PRSNT: CPT | Performed by: PHYSICAL MEDICINE & REHABILITATION

## 2023-06-27 RX ORDER — OXYCODONE AND ACETAMINOPHEN 10; 325 MG/1; MG/1
1 TABLET ORAL EVERY 4 HOURS PRN
Qty: 150 TABLET | Refills: 0 | Status: SHIPPED | OUTPATIENT
Start: 2023-06-27 | End: 2023-06-28 | Stop reason: RX

## 2023-06-27 ASSESSMENT — FIBROSIS 4 INDEX: FIB4 SCORE: 0.37

## 2023-06-27 ASSESSMENT — PAIN SCALES - GENERAL: PAINLEVEL: 8=MODERATE-SEVERE PAIN

## 2023-06-27 NOTE — ED NOTES
Assumed bedside report and patient care from LEANNE Quinteros. Patient is resting comfortably in bed with no signs of labored breathing or restlessness. Safety measures in place, call light within reach.   Pt PO challenge successful. ERP notified

## 2023-06-27 NOTE — PROGRESS NOTES
Follow up patient note  Pain Medicine, Interventional spine and sports physiatry, Physical medicine rehabilitation    Date of Service: 06/27/2023    Chief complaint:   Joint pain and neck pain      HISTORY    Please see new patient note dated 06/08/2018 by Dr Kerr,  for more details.     HPI  Patient identification: Mary Martinez 53 y.o. female with RA involving multiple joints, atlantoaxial instability returns for follow-up of her pain related to rheumatoid arthritis.      Interval history:     Mary has been hospitalized since the last visit.  Her son, Lobo, reports that the patient started hallucinating and was taken to the ED and found to have a UTI.  She was hospitalized. It sounds like her nutritional status has continued to be an issue and she was finally able to have PEG tube placed    She has been getting PEG tube and is trying to eat by mouth as she tolerates.  Follow-up with gastroenterology has not been arranged since her hospitalization.    Last  06/13/2023. #35.  She reports that she was not able to get scheduled with me and is now out of her medication.    Pain is manageable with percocet 10/325, 5/day on average.  This was the dose she was on prior to insurance issues last fall.  Pain is 10/10 on the NRS.  She continues to have ongoing pain complaints in multiple joints at baseline.  This is unchanged.    Dr. Dela Cruz is her Rheumatologist.    Reports that she works with Mckenzie at [facility] and that she will have a new PCP as Dr. Christopher left [facility].  She has a new PCP there, but reports that she sees a different provider each time she has an appointment.    Works with [facility] for behavioral health.  Duloxetine caused side effects and she has resumed paxil.    PHQ-9: 14 denies suicidality, continues to work with her psychology and psychiatry team  ORT: 4      Records review:  ED on 06/26/2023  Hospitalization 05/25/2023-06/13/2023  Failure to thrive, admitted after altered mental status, HASMUKH and UTI.    Hospitalized for severe sepsis due to recurrent urinary tract infection.  Reviewed discharged from Dr. Mann 02/17/2023-02/19/2023    ROS  See HPI    Red Flags :   Fever, Chills, Sweats: Denies  Involuntary Weight Loss: Denies  Bowel/Bladder Incontinence: Denies      PMHx:   Past Medical History:   Diagnosis Date    Acute renal failure (ARF) (HCC)     Allergy     Anemia     Anxiety     Arthritis     Rheumatoid -follows with rheumatologist. Has several fractures due to RA.    ASTHMA     inhalers prn    Blood transfusion without reported diagnosis     Bowel habit changes     4/24/20-constipation and diarrhea.    Bronchitis last approx 2018    Chronic pain 04/24/2020    Due to RA    Dental disorder     no teeth upper-does not wear her denture. Broken teeth on bottom.    Depression     GERD (gastroesophageal reflux disease)     GIB (gastrointestinal bleeding)     Head ache     Heart burn     taking famotidine    Hiatus hernia syndrome     History of pancreatitis     Hypokalemia     Hyponatremia     Idiopathic chronic pancreatitis (HCC)     Indigestion     taking famotidine    Intractable nausea and vomiting     Kidney disease     Pain     CPS-everywhere    Pneumonia 10/2019    PONV (postoperative nausea and vomiting)     Psychiatric problem     anxiety and depression    Rheumatoid aortitis     Rheumatoid arthritis(714.0) 2003    SMAS (superior mesenteric artery syndrome) ([facility])     Substance abuse ([facility])        PSHx:   Past Surgical History:   Procedure Laterality Date    RI PLACE PERCUT GASTROSTOMY TUBE N/A 6/12/2023    Procedure: INSERTION, PEG TUBE - PEG AND J TUBE PLACEMENT;  Surgeon: Marilyn Anderson M.D.;  Location: SURGERY SAME DAY [facility];  Service: Gastroenterology    RI UPPER GI ENDOSCOPY,DIAGNOSIS  6/12/2023    Procedure: GASTROSCOPY;  Surgeon: Marilyn Anderson M.D.;  Location: SURGERY SAME DAY [facility];  Service: Gastroenterology     OK UPPER GI ENDOSCOPY,DIAGNOSIS N/A 9/9/2022    Procedure: ENDOSCOPIC ULTRASOUND- UPPER;  Surgeon: Jorge Brooke M.D.;  Location: [facility];  Service: EUS    EGD W/ENDOSCOPIC ULTRASOUND N/A 9/9/2022    Procedure: EGD, WITH ENDOSCOPIC US;  Surgeon: Jorge Brooke M.D.;  Location: [facility];  Service: EUS    OK ENDOSCOPIC US EXAM, ESOPH  4/29/2020    Procedure: EGD, WITH ENDOSCOPIC US;  Surgeon: Jorge Brooke M.D.;  Location: [facility];  Service: Gastroenterology    GASTROSCOPY-ENDO  4/29/2020    Procedure: GASTROSCOPY;  Surgeon: Jorge Brooke M.D.;  Location: [facility];  Service: Gastroenterology    EGD WITH ASP/BX  4/29/2020    Procedure: EGD, WITH ASPIRATION BIOPSY - POSS FNA;  Surgeon: Jorge Brooke M.D.;  Location: [facility];  Service: Gastroenterology    OK EXPLORATORY OF ABDOMEN N/A 10/4/2019    Procedure: LAPAROTOMY, EXPLORATORY AND REPAIR OF DUODENAL PERFORATION;  Surgeon: James Dumont M.D.;  Location: [facility];  Service: General    COLONOSCOPY  2018    with upper endoscopy    FINGER ARTHROPLASTY Right 6/5/2017    Procedure: FINGER ARTHROPLASTY - LONG, RING AND SMALL VOLAR PLATE;  Surgeon: Lobo Rosen M.D.;  Location: SURGERY SAME DAY [facility];  Service:     FINGER AMPUTATION  6/5/2017    Procedure: FINGER AMPUTATION - LONG, RING AND SMALL AT THE PROXIMAL INTERPHALANGEAL JOINT;  Surgeon: Lobo Rosen M.D.;  Location: SURGERY SAME DAY [facility];  Service:     FINGER ARTHROPLASTY Right 4/17/2017    Procedure: FINGER ARTHROPLASTY ;  Surgeon: Lobo Rosen M.D.;  Location: SURGERY SAME DAY [facility];  Service:     FINGER AMPUTATION Right 9/14/2016    Procedure: FINGER AMPUTATION INDEX;  Surgeon: Lobo Rosen M.D.;  Location: SURGERY SAME DAY [facility];  Service:     IRRIGATION & DEBRIDEMENT ORTHO Right 9/11/2016    Procedure: IRRIGATION &  DEBRIDEMENT ORTHO - right index finger;  Surgeon: Madhu Rosen M.D.;  Location: SURGERY [facility];  Service:     FUSION, PIP JOINT, TOE Right 8/29/2016    Procedure: RE-DO INDEX FINGER PROXIMAL INTERPHALANGEAL ARTHRODESIS;  Surgeon: Lobo Rosen M.D.;  Location: SURGERY SAME DAY [facility];  Service:     BONE GRAFT Right 8/29/2016    Procedure: BONE GRAFT - DISTAL RADIUS ;  Surgeon: Lobo Rosen M.D.;  Location: SURGERY SAME DAY [facility];  Service:     ARTHRODESIS Right 5/6/2016    Procedure: ARTHRODESIS INDEX FINGER PROXIMAL INTERPHALANGEAL;  Surgeon: Lobo Rosen M.D.;  Location: SURGERY SAME DAY [facility];  Service:     FOOT RECONSTRUCTION RHEUMATIC Left 7/29/2015    Procedure: FOOT RECONSTRUCTION RHEUMATIC;  Surgeon: Heriberto Alves M.D.;  Location: SURGERY [facility];  Service:     TOE FUSION Right 5/27/2015    Procedure: TOE FUSION 1ST METATARSALPHALANGEAL JOINT;  Surgeon: Heriberto Alves M.D.;  Location: SURGERY [facility];  Service:     BONE SPUR EXCISION  5/27/2015    Procedure: BONE SPUR EXCISION METATARSAL HEAD 2-3;  Surgeon: Heriberto Alves M.D.;  Location: SURGERY [facility];  Service:     HAMMERTOE CORRECTION  5/27/2015    Procedure: HAMMERTOE CORRECTION AND SOFT TISSUE RE-ALINGMENT 2-3 ;  Surgeon: Heriberto Alves M.D.;  Location: SURGERY [facility];  Service:     DENTAL EXTRACTION(S)  2014    all of upper teeth    ABDOMINAL ABSCESS DRAINAGE  9/27/2011    Performed by VERO WRIGHT at [facility]    EMANUEL BY LAPAROSCOPY  9/27/2011    Performed by VERO WRIGHT at [facility]    APPENDECTOMY  2011    Found out it had ruptured prior to abcess surgery    REPEAT C SECTION  2008    REPEAT C SECTION  2005    REPEAT C SECTION  1999    PRIMARY C SECTION  1991    TONSILLECTOMY  1982    WRIST EXPLORATION Left 1980's    fractured wrist-no hardware       Family history   Family History   Problem Relation Age of  Onset    Cancer Mother     Heart Disease Mother     Hypertension Mother     Heart Disease Father     Hypertension Father          Medications:   Outpatient Medications Marked as Taking for the 6/27/23 encounter (Office Visit) with Jayy Kerr M.D.   Medication Sig Dispense Refill    oxyCODONE-acetaminophen (PERCOCET-10)  MG Tab Take 1 Tablet by mouth every four hours as needed for Severe Pain for up to 30 days. 150 Tablet 0       Allergies:   Allergies   Allergen Reactions    Penicillins Anaphylaxis and Hives     Tolerates cephalosporins; reports throat swelling with PCN    Aripiprazole Nausea     Spasms, shaking      Nitrous Oxide Vomiting       Social Hx:   Social History     Socioeconomic History    Marital status:      Spouse name: Ousmane    Number of children: 5    Years of education: Not on file    Highest education level: Not on file   Occupational History    Not on file   Tobacco Use    Smoking status: Every Day     Packs/day: 0.50     Years: 30.00     Pack years: 15.00     Types: Cigarettes    Smokeless tobacco: Never   Vaping Use    Vaping Use: Never used   Substance and Sexual Activity    Alcohol use: Never    Drug use: Never    Sexual activity: Not Currently   Other Topics Concern     Service No    Blood Transfusions Yes    Caffeine Concern No    Occupational Exposure No    Hobby Hazards No    Sleep Concern No    Stress Concern Yes    Weight Concern No    Special Diet No    Back Care No    Exercise Yes    Bike Helmet Yes    Seat Belt Yes    Self-Exams Yes   Social History Narrative    ** Merged History Encounter **          Social Determinants of Health     Financial Resource Strain: Low Risk  (9/2/2022)    Overall Financial Resource Strain (CARDIA)     Difficulty of Paying Living Expenses: Not hard at all   Food Insecurity: No Food Insecurity (9/2/2022)    Hunger Vital Sign     Worried About Running Out of Food in the Last Year: Never true     Ran Out of Food in the Last Year:  "Never true   Transportation Needs: No Transportation Needs (9/2/2022)    PRAPARE - Transportation     Lack of Transportation (Medical): No     Lack of Transportation (Non-Medical): No   Physical Activity: Not on file   Stress: Not on file   Social Connections: Not on file   Intimate Partner Violence: Not on file   Housing Stability: Not on file         EXAMINATION     Physical Exam:   Vitals: BP 96/58 (BP Location: Right arm, Patient Position: Sitting, BP Cuff Size: Adult)   Pulse (!) 115   Temp 37.1 °C (98.8 °F) (Temporal)   Ht 1.6 m (5' 3\")   Wt 46 kg (101 lb 6.6 oz)   SpO2 99%     Constitutional:   Body Habitus: Body mass index is 17.96 kg/m².  Cooperation: Fully cooperates with exam  Appearance: Appears pale, thin.    Respiratory-  breathing comfortable on room air, no audible wheezing  Cardiovascular- no lower extremity edema is observed.     Psychiatric- alert and oriented ×3. Normal affect.     Musculoskeletal:    Partial hand amputation on the right at the MCPs; ulnar deviation on the left with MCP subluxation, mild atrophy of the hand intrinsics with wasting of the thenar eminence. No warmth or erythema over left MCP there is a soft area of swelling    Gait is steady without assist device.  Mildly antalgic without loss of balance, walking with mildly increased hip flexion    MEDICAL DECISION MAKING    DATA    Labs: UDS 10/30/2018 consistent with medications.  Oxycodone and metabolites without other substances   UDS 04/19/2019 consistent with medications. Oxycodone and metabolites without other substances   UDS 07/19/2019 consistent with medications.  Oxycodone and metabolites without other substances   UDS 11/22/2019 consistent with medications.  Oxycodone and metabolites without other substances   UDS 02/20/2020 absent for Oxycodone and metabolites without other substances  UDS 06/10/2020 positive for oxycodone and metabolites without other substances  UDS 08/18/2020 positive for oxycodone and " metabolites, positive for EtG without EtS  UDS 09/07/2021 negative for oxycodone and metabolites, patient was out of medication, no other abnormalities  UDS 05/18/2022: positive for oxycodone and metabolites.    UDS 02/24/2023: positive for noroxycodone, negative for other metabolites.  No other substances    Imaging:    Films reviewed.  These are my reads:    Xray right shoulder 08/20/2020  There is note of severe degenerative change of the glenohumeral joint.  No fracture.  Erosive changes, consistent with known history of RA    MRI cervical spine 07/31/2018:    There is is note of motion at the atlantodental level with 5mm atlantodental inverval in flexion that reduces to 1-2 mm in extension.  Mild retrolisthesis of C4 on C5 and anterolisthesis of C5- on C6.  Disc extrusion at C6-7 with mild central canal stenosis    Xray left shoulder 2/5/2018 Humeral head is elevated.  Glenohumeral joint arthritis.    Reports reviewed:    Xray right shoulder 08/20/2020 [facility]  Impression  Extensive erosive changes of the humeral head and degenerative changes of the glenohumeral joint.  Findings are concerning for inflammatory arthropathy such as rheumatoid arthritis.      Xray cervical spine 11/14/2018  1. No acute fracture  2. Persistent motion at the C1-2 joint as before, suggesting atlantoaxial instability  3. Minimal instability noted at C5-6 level as described.    MRI cervical spine 07/31/2018  1. Widening of the atlantodental interal in neutral and flexion positions with a 5mm space which reduces to 1-2 mm in extension  2. 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 Rheumatoid arthritis involving multiple sites with positive rheumatoid factor (HCC)  M05.79   2. At risk for falls  Z91.81   3.  Failure to thrive in adult  R62.7   4. Chronic pain syndrome  G89.4   5. Chronic, continuous use of opioids  F11.90   6. Arthritis of glenohumeral joint  M19.019   7. Depression, unspecified depression type  F32.A                 ASSESSMENT and PLAN:     Mary Martinez 53 y.o. female with rheumatoid arthritis    Mary was seen today for follow-up.    Orders and management for this visit:    Orders Placed This Encounter    Referral to Home Health    Patient has been identified as having a positive depression screening. Appropriate orders and counseling have been given.    oxyCODONE-acetaminophen (PERCOCET-10)  MG Tab    Consent for Opiate Prescription    Controlled Substance Treatment Agreement     Discussed referral to home health.  Reviewed her most recent hospitalization, but I cannot see that this was ordered.  Follow-up with Gastroenterology recommended.  Plan for percocet 10/325 up to 5/day.  Script given for a month.  She has been on chronic opioids.   reviewed.  Most recent UDS reviewed.    Her pain has been refractory to physical therapy, other medications and has been on chronic, stable opioids.  Continue with behavioral health.    In prescribing controlled substances to this patient, I certify that I have obtained and reviewed the medical history of Mary Martinez. I have also made a good aristides effort to obtain applicable records from other providers who have treated the patient and records did not demonstrate any increased risk of substance abuse that would prevent me from prescribing controlled substances.     I have conducted a physical exam and documented it. I have reviewed Ms. Martinez’s prescription history as maintained by the [place] Prescription Monitoring Program.   ORT 4, indicates moderate risk    I have assessed the patient’s risk for abuse, dependency, and addiction using the validated Opioid Risk Tool available at  https://www.mdcalc.com/hkgbpm-vkad-cnfk-ort-narcotic-abuse.     Given the above, I believe the benefits of controlled substance therapy outweigh the risks. The reasons for prescribing controlled substances include non-narcotic, oral analgesic alternatives have been inadequate for pain control and in my professional opinion, controlled substances are the only reasonable choice for this patient because her pain was note adequately controlled with alternatives  and she has chronic progressive disease. Accordingly, I have discussed the risk and benefits, treatment plan, and alternative therapies with the patient.       Follow up: 4 weeks    My total time spent caring for the patient on the day of the encounter was 41 minutes.   This does not include time spent on separately billable procedures/tests.      Please note that this dictation was created using voice recognition software. I have made every reasonable attempt to correct obvious errors but there may be errors of grammar and content that I may have overlooked prior to finalization of this note.      Jayy Kerr MD  Interventional Spine and Sports Physiatry  Physical Medicine and Rehabilitation  [facility]      PCP: [facility]

## 2023-06-28 ENCOUNTER — TELEPHONE (OUTPATIENT)
Dept: PHYSICAL MEDICINE AND REHAB | Facility: MEDICAL CENTER | Age: 53
End: 2023-06-28
Payer: MEDICAID

## 2023-06-28 DIAGNOSIS — M05.79 RHEUMATOID ARTHRITIS INVOLVING MULTIPLE SITES WITH POSITIVE RHEUMATOID FACTOR (HCC): ICD-10-CM

## 2023-06-28 DIAGNOSIS — M19.019 ARTHRITIS OF GLENOHUMERAL JOINT: ICD-10-CM

## 2023-06-28 DIAGNOSIS — G89.4 CHRONIC PAIN SYNDROME: ICD-10-CM

## 2023-06-28 DIAGNOSIS — F11.90 CHRONIC, CONTINUOUS USE OF OPIOIDS: ICD-10-CM

## 2023-06-28 RX ORDER — OXYCODONE AND ACETAMINOPHEN 7.5; 325 MG/1; MG/1
1 TABLET ORAL EVERY 6 HOURS PRN
Qty: 112 TABLET | Refills: 0 | Status: SHIPPED | OUTPATIENT
Start: 2023-06-28 | End: 2023-06-29 | Stop reason: RX

## 2023-06-28 RX ORDER — OXYCODONE AND ACETAMINOPHEN 10; 325 MG/1; MG/1
1 TABLET ORAL EVERY 4 HOURS PRN
Qty: 150 TABLET | Refills: 0 | Status: SHIPPED | OUTPATIENT
Start: 2023-06-28 | End: 2023-06-28 | Stop reason: RX

## 2023-06-28 NOTE — TELEPHONE ENCOUNTER
Left message for the pt to give the office a call back. Please notify patient that I have sent the prescription to [facility] on [address] and canceled the other prescription.  Please start the prior authorization.  Thank you

## 2023-06-28 NOTE — TELEPHONE ENCOUNTER
Pt stated that neal is on backorder of medication. Would like to be sent to [facility] on [address]. Added pharmacy to chart

## 2023-06-29 ENCOUNTER — HOSPITAL ENCOUNTER (EMERGENCY)
Facility: MEDICAL CENTER | Age: 53
End: 2023-06-29
Attending: EMERGENCY MEDICINE
Payer: MEDICAID

## 2023-06-29 ENCOUNTER — APPOINTMENT (OUTPATIENT)
Dept: RADIOLOGY | Facility: MEDICAL CENTER | Age: 53
End: 2023-06-29
Attending: EMERGENCY MEDICINE
Payer: MEDICAID

## 2023-06-29 VITALS
HEIGHT: 63 IN | HEART RATE: 83 BPM | SYSTOLIC BLOOD PRESSURE: 132 MMHG | DIASTOLIC BLOOD PRESSURE: 83 MMHG | OXYGEN SATURATION: 98 % | BODY MASS INDEX: 14.18 KG/M2 | TEMPERATURE: 98.4 F | RESPIRATION RATE: 17 BRPM | WEIGHT: 80 LBS

## 2023-06-29 DIAGNOSIS — M19.019 ARTHRITIS OF GLENOHUMERAL JOINT: ICD-10-CM

## 2023-06-29 DIAGNOSIS — G89.29 CHRONIC ABDOMINAL PAIN: ICD-10-CM

## 2023-06-29 DIAGNOSIS — E86.0 DEHYDRATION: ICD-10-CM

## 2023-06-29 DIAGNOSIS — M05.79 RHEUMATOID ARTHRITIS INVOLVING MULTIPLE SITES WITH POSITIVE RHEUMATOID FACTOR (HCC): ICD-10-CM

## 2023-06-29 DIAGNOSIS — G89.4 CHRONIC PAIN SYNDROME: ICD-10-CM

## 2023-06-29 DIAGNOSIS — R10.9 CHRONIC ABDOMINAL PAIN: ICD-10-CM

## 2023-06-29 DIAGNOSIS — R55 SYNCOPE, UNSPECIFIED SYNCOPE TYPE: ICD-10-CM

## 2023-06-29 LAB
ALBUMIN SERPL BCP-MCNC: 3.6 G/DL (ref 3.2–4.9)
ALBUMIN/GLOB SERPL: 0.9 G/DL
ALP SERPL-CCNC: 154 U/L (ref 30–99)
ALT SERPL-CCNC: 6 U/L (ref 2–50)
ANION GAP SERPL CALC-SCNC: 17 MMOL/L (ref 7–16)
AST SERPL-CCNC: <5 U/L (ref 12–45)
BASOPHILS # BLD AUTO: 0.6 % (ref 0–1.8)
BASOPHILS # BLD: 0.06 K/UL (ref 0–0.12)
BILIRUB SERPL-MCNC: 0.4 MG/DL (ref 0.1–1.5)
BLOOD CULTURE HOLD CXBCH: NORMAL
BUN SERPL-MCNC: 19 MG/DL (ref 8–22)
CALCIUM ALBUM COR SERPL-MCNC: 9.5 MG/DL (ref 8.5–10.5)
CALCIUM SERPL-MCNC: 9.2 MG/DL (ref 8.5–10.5)
CHLORIDE SERPL-SCNC: 106 MMOL/L (ref 96–112)
CO2 SERPL-SCNC: 16 MMOL/L (ref 20–33)
CREAT SERPL-MCNC: 0.88 MG/DL (ref 0.5–1.4)
EKG IMPRESSION: NORMAL
EOSINOPHIL # BLD AUTO: 0.11 K/UL (ref 0–0.51)
EOSINOPHIL NFR BLD: 1.1 % (ref 0–6.9)
ERYTHROCYTE [DISTWIDTH] IN BLOOD BY AUTOMATED COUNT: 68.2 FL (ref 35.9–50)
GFR SERPLBLD CREATININE-BSD FMLA CKD-EPI: 79 ML/MIN/1.73 M 2
GLOBULIN SER CALC-MCNC: 4.2 G/DL (ref 1.9–3.5)
GLUCOSE SERPL-MCNC: 93 MG/DL (ref 65–99)
HCT VFR BLD AUTO: 34.7 % (ref 37–47)
HGB BLD-MCNC: 11.2 G/DL (ref 12–16)
IMM GRANULOCYTES # BLD AUTO: 0.06 K/UL (ref 0–0.11)
IMM GRANULOCYTES NFR BLD AUTO: 0.6 % (ref 0–0.9)
LIPASE SERPL-CCNC: 80 U/L (ref 11–82)
LYMPHOCYTES # BLD AUTO: 1.77 K/UL (ref 1–4.8)
LYMPHOCYTES NFR BLD: 17 % (ref 22–41)
MCH RBC QN AUTO: 31.6 PG (ref 27–33)
MCHC RBC AUTO-ENTMCNC: 32.3 G/DL (ref 32.2–35.5)
MCV RBC AUTO: 98 FL (ref 81.4–97.8)
MONOCYTES # BLD AUTO: 0.63 K/UL (ref 0–0.85)
MONOCYTES NFR BLD AUTO: 6 % (ref 0–13.4)
NEUTROPHILS # BLD AUTO: 7.81 K/UL (ref 1.82–7.42)
NEUTROPHILS NFR BLD: 74.7 % (ref 44–72)
NRBC # BLD AUTO: 0 K/UL
NRBC BLD-RTO: 0 /100 WBC (ref 0–0.2)
PLATELET # BLD AUTO: 398 K/UL (ref 164–446)
PMV BLD AUTO: 10.6 FL (ref 9–12.9)
POTASSIUM SERPL-SCNC: 3.7 MMOL/L (ref 3.6–5.5)
PROT SERPL-MCNC: 7.8 G/DL (ref 6–8.2)
RBC # BLD AUTO: 3.54 M/UL (ref 4.2–5.4)
SODIUM SERPL-SCNC: 139 MMOL/L (ref 135–145)
TROPONIN T SERPL-MCNC: 13 NG/L (ref 6–19)
WBC # BLD AUTO: 10.4 K/UL (ref 4.8–10.8)

## 2023-06-29 PROCEDURE — 36415 COLL VENOUS BLD VENIPUNCTURE: CPT

## 2023-06-29 PROCEDURE — 96375 TX/PRO/DX INJ NEW DRUG ADDON: CPT

## 2023-06-29 PROCEDURE — 93005 ELECTROCARDIOGRAM TRACING: CPT

## 2023-06-29 PROCEDURE — 96376 TX/PRO/DX INJ SAME DRUG ADON: CPT

## 2023-06-29 PROCEDURE — 93005 ELECTROCARDIOGRAM TRACING: CPT | Performed by: EMERGENCY MEDICINE

## 2023-06-29 PROCEDURE — 700111 HCHG RX REV CODE 636 W/ 250 OVERRIDE (IP): Mod: UD | Performed by: EMERGENCY MEDICINE

## 2023-06-29 PROCEDURE — 84484 ASSAY OF TROPONIN QUANT: CPT

## 2023-06-29 PROCEDURE — 700102 HCHG RX REV CODE 250 W/ 637 OVERRIDE(OP): Mod: UD | Performed by: EMERGENCY MEDICINE

## 2023-06-29 PROCEDURE — A9270 NON-COVERED ITEM OR SERVICE: HCPCS | Mod: UD | Performed by: EMERGENCY MEDICINE

## 2023-06-29 PROCEDURE — 80053 COMPREHEN METABOLIC PANEL: CPT

## 2023-06-29 PROCEDURE — 83690 ASSAY OF LIPASE: CPT

## 2023-06-29 PROCEDURE — 74022 RADEX COMPL AQT ABD SERIES: CPT

## 2023-06-29 PROCEDURE — 700105 HCHG RX REV CODE 258: Mod: JZ,UD | Performed by: EMERGENCY MEDICINE

## 2023-06-29 PROCEDURE — 99285 EMERGENCY DEPT VISIT HI MDM: CPT

## 2023-06-29 PROCEDURE — 96374 THER/PROPH/DIAG INJ IV PUSH: CPT

## 2023-06-29 PROCEDURE — 85025 COMPLETE CBC W/AUTO DIFF WBC: CPT

## 2023-06-29 RX ORDER — SODIUM CHLORIDE 9 MG/ML
1000 INJECTION, SOLUTION INTRAVENOUS ONCE
Status: COMPLETED | OUTPATIENT
Start: 2023-06-29 | End: 2023-06-29

## 2023-06-29 RX ORDER — MORPHINE SULFATE 4 MG/ML
4 INJECTION INTRAVENOUS ONCE
Status: COMPLETED | OUTPATIENT
Start: 2023-06-29 | End: 2023-06-29

## 2023-06-29 RX ORDER — SODIUM CHLORIDE, SODIUM LACTATE, POTASSIUM CHLORIDE, CALCIUM CHLORIDE 600; 310; 30; 20 MG/100ML; MG/100ML; MG/100ML; MG/100ML
1000 INJECTION, SOLUTION INTRAVENOUS ONCE
Status: COMPLETED | OUTPATIENT
Start: 2023-06-29 | End: 2023-06-29

## 2023-06-29 RX ORDER — HYDROCODONE BITARTRATE AND ACETAMINOPHEN 10; 325 MG/1; MG/1
1 TABLET ORAL EVERY 6 HOURS PRN
Qty: 112 TABLET | Refills: 0 | Status: SHIPPED | OUTPATIENT
Start: 2023-06-29 | End: 2023-07-27

## 2023-06-29 RX ORDER — HYDROCODONE BITARTRATE AND ACETAMINOPHEN 10; 325 MG/1; MG/1
1 TABLET ORAL ONCE
Status: COMPLETED | OUTPATIENT
Start: 2023-06-29 | End: 2023-06-29

## 2023-06-29 RX ORDER — LORAZEPAM 2 MG/ML
0.5 INJECTION INTRAMUSCULAR ONCE
Status: COMPLETED | OUTPATIENT
Start: 2023-06-29 | End: 2023-06-29

## 2023-06-29 RX ORDER — ONDANSETRON 2 MG/ML
4 INJECTION INTRAMUSCULAR; INTRAVENOUS ONCE
Status: COMPLETED | OUTPATIENT
Start: 2023-06-29 | End: 2023-06-29

## 2023-06-29 RX ADMIN — SODIUM CHLORIDE 1000 ML: 9 INJECTION, SOLUTION INTRAVENOUS at 10:49

## 2023-06-29 RX ADMIN — HYDROCODONE BITARTRATE AND ACETAMINOPHEN 1 TABLET: 10; 325 TABLET ORAL at 14:29

## 2023-06-29 RX ADMIN — MORPHINE SULFATE 4 MG: 4 INJECTION INTRAVENOUS at 13:09

## 2023-06-29 RX ADMIN — ONDANSETRON 4 MG: 2 INJECTION INTRAMUSCULAR; INTRAVENOUS at 10:45

## 2023-06-29 RX ADMIN — SODIUM CHLORIDE, POTASSIUM CHLORIDE, SODIUM LACTATE AND CALCIUM CHLORIDE 1000 ML: 600; 310; 30; 20 INJECTION, SOLUTION INTRAVENOUS at 13:11

## 2023-06-29 RX ADMIN — MORPHINE SULFATE 4 MG: 4 INJECTION INTRAVENOUS at 10:49

## 2023-06-29 RX ADMIN — LORAZEPAM 0.5 MG: 2 INJECTION INTRAMUSCULAR; INTRAVENOUS at 11:56

## 2023-06-29 ASSESSMENT — FIBROSIS 4 INDEX: FIB4 SCORE: 0.37

## 2023-06-29 ASSESSMENT — PAIN DESCRIPTION - PAIN TYPE: TYPE: ACUTE PAIN

## 2023-06-29 NOTE — ED NOTES
Attempted to call and notify xray that pt was medicated. No answer. Notified xray techs doing a portable in another room. That xray tech to make a note.

## 2023-06-29 NOTE — ED NOTES
Pt discharged, educated on discharge instructions.  Patient was informed about diagnosis, symptom management, risks, and home care instructions.  Patient verbalized understanding and signed discharge instructions. Copy of discharge instructions in chart.  Patient ambulated out.  Patient has personal belongings

## 2023-06-29 NOTE — ED PROVIDER NOTES
"ED Provider Note    CHIEF COMPLAINT  Chief Complaint   Patient presents with    Syncope     BIBA for syncopal episode. Pt states she had dizziness before passing out. Pt family witnessed it. +LOC, hit head. No blood thinners. Recent gtube placement. Pt also states chronic nausea and abdominal pain since stomach rupture in 2019. Blood sugar 135     EXTERNAL RECORDS REVIEWED  The patient was last seen here three days ago for lower abdominal pain. She had a recent admission May 25 for failure to thrive and had a peg tube placed and a CT was ordered. Her CT revealed small bowel fluid and increased colonic stool but no other acute findings on 5/26. She was discharged home.      HPI/ROS  LIMITATION TO HISTORY   Select: : None  OUTSIDE HISTORIAN(S):  EMS      Mary Martinez is a 53 y.o. female with history of chronic abdominal pain who presents to the Emergency Department with injuries secondary to a syncope episode this morning. She explains that she rushed to the bathroom this morning and when she walked out of the bathroom she became dizzy and passed out. She reports that her abdominal pain has not improved since she was discharged from the hospital and has worsened. She has associated symptoms of ongoing vomiting and diarrhea, but denies a fever. The patient reports that she is supposed to take Oxycodone for her symptoms but the \"pharmacy does not have her prescription.\" She has been out of medication for a week.    PAST MEDICAL HISTORY  Past Medical History:   Diagnosis Date    Acute renal failure (ARF) (HCC)     Allergy     Anemia     Anxiety     Arthritis     Rheumatoid -follows with rheumatologist. Has several fractures due to RA.    ASTHMA     inhalers prn    Blood transfusion without reported diagnosis     Bowel habit changes     4/24/20-constipation and diarrhea.    Bronchitis last approx 2018    Chronic pain 04/24/2020    Due to RA    Dental disorder     no teeth upper-does not wear her denture. Broken " teeth on bottom.    Depression     GERD (gastroesophageal reflux disease)     GIB (gastrointestinal bleeding)     Head ache     Heart burn     taking famotidine    Hiatus hernia syndrome     History of pancreatitis     Hypokalemia     Hyponatremia     Idiopathic chronic pancreatitis (HCC)     Indigestion     taking famotidine    Intractable nausea and vomiting     Kidney disease     Pain     CPS-everywhere    Pneumonia 10/2019    PONV (postoperative nausea and vomiting)     Psychiatric problem     anxiety and depression    Rheumatoid aortitis     Rheumatoid arthritis(714.0) 2003    SMAS (superior mesenteric artery syndrome) (HCC)     Substance abuse (HCC)         SURGICAL HISTORY  Past Surgical History:   Procedure Laterality Date    WI PLACE PERCUT GASTROSTOMY TUBE N/A 6/12/2023    Procedure: INSERTION, PEG TUBE - PEG AND J TUBE PLACEMENT;  Surgeon: Marilyn Anderson M.D.;  Location: SURGERY SAME DAY [facility];  Service: Gastroenterology    WI UPPER GI ENDOSCOPY,DIAGNOSIS  6/12/2023    Procedure: GASTROSCOPY;  Surgeon: Marilyn Anderson M.D.;  Location: SURGERY SAME DAY [facility];  Service: Gastroenterology    WI UPPER GI ENDOSCOPY,DIAGNOSIS N/A 9/9/2022    Procedure: ENDOSCOPIC ULTRASOUND- UPPER;  Surgeon: Jorge Brooke M.D.;  Location: [facility];  Service: EUS    EGD W/ENDOSCOPIC ULTRASOUND N/A 9/9/2022    Procedure: EGD, WITH ENDOSCOPIC US;  Surgeon: Jorge Brooke M.D.;  Location: [facility];  Service: EUS    WI ENDOSCOPIC US EXAM, ESOPH  4/29/2020    Procedure: EGD, WITH ENDOSCOPIC US;  Surgeon: Jorge Brooke M.D.;  Location: [facility];  Service: Gastroenterology    GASTROSCOPY-ENDO  4/29/2020    Procedure: GASTROSCOPY;  Surgeon: Jorge Brooke M.D.;  Location: [facility];  Service: Gastroenterology    EGD WITH ASP/BX  4/29/2020    Procedure: EGD, WITH ASPIRATION BIOPSY - POSS FNA;  Surgeon: Jorge Brooke M.D.;   Location: SURGERY [facility];  Service: Gastroenterology    NC EXPLORATORY OF ABDOMEN N/A 10/4/2019    Procedure: LAPAROTOMY, EXPLORATORY AND REPAIR OF DUODENAL PERFORATION;  Surgeon: James Dumont M.D.;  Location: SURGERY [facility];  Service: General    COLONOSCOPY  2018    with upper endoscopy    FINGER ARTHROPLASTY Right 6/5/2017    Procedure: FINGER ARTHROPLASTY - LONG, RING AND SMALL VOLAR PLATE;  Surgeon: Lobo Rosen M.D.;  Location: SURGERY SAME DAY [facility];  Service:     FINGER AMPUTATION  6/5/2017    Procedure: FINGER AMPUTATION - LONG, RING AND SMALL AT THE PROXIMAL INTERPHALANGEAL JOINT;  Surgeon: Lobo Rosen M.D.;  Location: SURGERY SAME DAY [facility];  Service:     FINGER ARTHROPLASTY Right 4/17/2017    Procedure: FINGER ARTHROPLASTY ;  Surgeon: Lobo Rosen M.D.;  Location: SURGERY SAME DAY [facility];  Service:     FINGER AMPUTATION Right 9/14/2016    Procedure: FINGER AMPUTATION INDEX;  Surgeon: Lobo Rosen M.D.;  Location: SURGERY SAME DAY [facility];  Service:     IRRIGATION & DEBRIDEMENT ORTHO Right 9/11/2016    Procedure: IRRIGATION & DEBRIDEMENT ORTHO - right index finger;  Surgeon: Madhu Rosen M.D.;  Location: [facility];  Service:     FUSION, PIP JOINT, TOE Right 8/29/2016    Procedure: RE-DO INDEX FINGER PROXIMAL INTERPHALANGEAL ARTHRODESIS;  Surgeon: Lobo Rosen M.D.;  Location: SURGERY SAME DAY [facility];  Service:     BONE GRAFT Right 8/29/2016    Procedure: BONE GRAFT - DISTAL RADIUS ;  Surgeon: Lobo Rosen M.D.;  Location: SURGERY SAME DAY [facility];  Service:     ARTHRODESIS Right 5/6/2016    Procedure: ARTHRODESIS INDEX FINGER PROXIMAL INTERPHALANGEAL;  Surgeon: Lobo Rosen M.D.;  Location: SURGERY SAME DAY [facility];  Service:     FOOT RECONSTRUCTION RHEUMATIC Left 7/29/2015    Procedure: FOOT RECONSTRUCTION RHEUMATIC;  Surgeon: Heriberto Alves M.D.;  Location:  SURGERY [facility];  Service:     TOE FUSION Right 5/27/2015    Procedure: TOE FUSION 1ST METATARSALPHALANGEAL JOINT;  Surgeon: Heriberto Alves M.D.;  Location: SURGERY [facility];  Service:     BONE SPUR EXCISION  5/27/2015    Procedure: BONE SPUR EXCISION METATARSAL HEAD 2-3;  Surgeon: Heriberto Alves M.D.;  Location: SURGERY [facility];  Service:     HAMMERTOE CORRECTION  5/27/2015    Procedure: HAMMERTOE CORRECTION AND SOFT TISSUE RE-ALINGMENT 2-3 ;  Surgeon: Heriberto Alves M.D.;  Location: SURGERY [facility];  Service:     DENTAL EXTRACTION(S)  2014    all of upper teeth    ABDOMINAL ABSCESS DRAINAGE  9/27/2011    Performed by VERO WRIGHT at SURGERY [facility]    EMANUEL BY LAPAROSCOPY  9/27/2011    Performed by VERO WRIGHT at SURGERY [facility]    APPENDECTOMY  2011    Found out it had ruptured prior to abcess surgery    REPEAT C SECTION  2008    REPEAT C SECTION  2005    REPEAT C SECTION  1999    PRIMARY C SECTION  1991    TONSILLECTOMY  1982    WRIST EXPLORATION Left 1980's    fractured wrist-no hardware        FAMILY HISTORY  Family History   Problem Relation Age of Onset    Cancer Mother     Heart Disease Mother     Hypertension Mother     Heart Disease Father     Hypertension Father        SOCIAL HISTORY   reports that she has been smoking cigarettes. She has a 15.00 pack-year smoking history. She has never used smokeless tobacco. She reports that she does not drink alcohol and does not use drugs.    CURRENT MEDICATIONS  Previous Medications    HYDROCODONE/ACETAMINOPHEN (NORCO)  MG TAB    Take 1 Tablet by mouth every 6 hours as needed for Severe Pain for up to 28 days.    NALOXONE (NARCAN) 4 MG/0.1ML LIQUID    One spray in one nostril for overdose and call 911.    OMEPRAZOLE (PRILOSEC) 20 MG DELAYED-RELEASE CAPSULE    Take 1 Capsule by mouth every day.    ONDANSETRON (ZOFRAN ODT) 4 MG TABLET DISPERSIBLE    Take 1 Tablet by mouth every 6 hours as needed for  "Nausea/Vomiting.    PAROXETINE (PAXIL) 30 MG TAB    Take 60 mg by mouth every evening. 2 tablet (60 mg)    QUETIAPINE (SEROQUEL) 100 MG TAB    Take 1 Tablet by mouth every evening.    THIAMINE (THIAMINE) 100 MG TABLET    Take 1 Tablet by mouth every day for 90 days.       ALLERGIES  Penicillins, Aripiprazole, and Nitrous oxide    PHYSICAL EXAM  BP 91/64   Pulse 98   Temp 37.4 °C (99.4 °F) (Temporal)   Resp 12   Ht 1.6 m (5' 3\")   Wt 36.3 kg (80 lb)   SpO2 96%      Constitutional: Chronically ill appearing. Alert in distress, appears uncomfortable  HENT: Normocephalic, Atraumatic. Bilateral external ears normal. Nose normal.  Moist mucous membranes.  Oropharynx clear.  Eyes: Pupils are equal and reactive. Conjunctiva normal.   Neck: Supple, full range of motion  Heart: Regular rate and rhythm.  No murmurs.    Lungs: No respiratory distress, normal work of breathing. Lungs clear to auscultation bilaterally.  Abdomen Soft, no distention. Diffuse tenderness with voluntary guarding, Peg tube in the mid-abdomen with no surrounding erythema or tenderness  Musculoskeletal: Atraumatic. No obvious deformities noted.  No lower extremity edema.  Skin: Warm, Dry.  No erythema, No rash.   Neurologic: Alert and oriented x3. Moving all extremities spontaneously without focal deficits.  Psychiatric: Affect normal, Mood normal, Appears appropriate and not intoxicated.     DIAGNOSTIC STUDIES / PROCEDURES    EKG  I have independently interpreted this EKG  Results for orders placed or performed during the hospital encounter of 23   EKG   Result Value Ref Range    Report       [facility] Emergency Dept.    Test Date:  2023  Pt Name:    STEFFANIE ELLISON                Department: ER  MRN:        [id number]                      Room:       RD 11  Gender:     Female                       Technician:  :        1970                   Requested By:ER TRIAGE PROTOCOL  Order #:    [id number]                "     Reading MD: Sapphire Carlos MD    Measurements  Intervals                                Axis  Rate:       99                           P:          83  NJ:         153                          QRS:        89  QRSD:       103                          T:          42  QT:         379  QTc:        487    Interpretive Statements  Sinus rhythm  Biatrial enlargement  Low voltage with right axis deviation  Borderline prolonged QT interval  No acute ST change  Compared to ECG 06/26/2023 13:00:03  No significant change from prior  Electronically Signed On 06- 11:46:05 PDT by Sapphire Carlos MD          LABS  Labs Reviewed   CBC WITH DIFFERENTIAL - Abnormal; Notable for the following components:       Result Value    RBC 3.54 (*)     Hemoglobin 11.2 (*)     Hematocrit 34.7 (*)     MCV 98.0 (*)     RDW 68.2 (*)     Neutrophils-Polys 74.70 (*)     Lymphocytes 17.00 (*)     Neutrophils (Absolute) 7.81 (*)     All other components within normal limits    Narrative:     Biotin intake of greater than 5 mg per day may interfere with  troponin levels, causing false low values.   COMP METABOLIC PANEL - Abnormal; Notable for the following components:    Co2 16 (*)     Anion Gap 17.0 (*)     AST(SGOT) <5 (*)     Alkaline Phosphatase 154 (*)     Globulin 4.2 (*)     All other components within normal limits    Narrative:     Biotin intake of greater than 5 mg per day may interfere with  troponin levels, causing false low values.   TROPONIN    Narrative:     Biotin intake of greater than 5 mg per day may interfere with  troponin levels, causing false low values.   LIPASE    Narrative:     Biotin intake of greater than 5 mg per day may interfere with  troponin levels, causing false low values.   CORRECTED CALCIUM    Narrative:     Biotin intake of greater than 5 mg per day may interfere with  troponin levels, causing false low values.   ESTIMATED GFR    Narrative:     Biotin intake of greater than 5 mg per day may interfere  with  troponin levels, causing false low values.   BLOOD CULTURE,HOLD        RADIOLOGY  I have independently interpreted the diagnostic imaging associated with this visit and am waiting the final reading from the radiologist.   My preliminary interpretation is a follows: no bowel obstruction or perforation  Radiologist interpretation:   DX-ABDOMEN COMPLETE WITH AP OR PA CXR   Final Result      1.  No acute abnormality of the chest or abdomen.   2.  Gastrojejunostomy tube in place with the tip terminating projecting over the expected location of the ligament of Treitz.           COURSE & MEDICAL DECISION MAKING  10:57 AM - Patient seen and examined at bedside. Discussed plan of care, including labs and imaging. Patient agrees to the plan of care. The patient will be resuscitated with 1L NS IV and medicated with Zofran 4mg, Morphine 4mg, Ativan 0.5mg. Ordered for EKG, Troponin, CMP, CBC w/diff, Lipase, DX-Abdomen w/ contrast to evaluate her symptoms.      ED Observation Status? Yes; I am placing the patient in to an observation status due to a diagnostic uncertainty as well as therapeutic intensity. Patient placed in observation status at 10:57 AM, 6/29/2023.     Observation plan is as follows: Labs and imaging to assess    12:46 PM - Patient was reevaluated at bedside. She still has some pain. I will medicated with more Morphine 4mg.    Upon Reevaluation, the patient's condition has: Improved; and will be discharged.    Patient discharged from ED Observation status at 2:06 PM (Time) 6/29/2023  (Date).     INITIAL ASSESSMENT, COURSE AND PLAN  Care Narrative: Patient with history of chronic abdominal pain and vomiting with PEG tube recently placed for nutrition presents following syncopal episode today as well as ongoing pain and vomiting.  She is afebrile with normal vitals on arrival.  EKG does not show ischemia or arrhythmia.  Labs are reassuring without leukocytosis or signs of infectious process.  No renal  dysfunction or electrolyte abnormality however does have signs of dehydration which could have caused syncope.  XR show PEG tube in place without evidence of bowel obstruction or perforation.  Plan to treat symptomatically followed by reassessment.    2:03 PM - Upon reassessment, patient is resting comfortably with normal vital signs.  No new complaints at this time, improved symptoms.  Discussed results with patient and/or family as well as importance of primary care follow up.  Recommended increased free water through tube.  Patient understands plan of care and strict return precautions for new or changing symptoms.      ADDITIONAL PROBLEM LIST  Problem #1: Syncope - likely vasovagal/related to dehydration    Problem #2: Chronic abdominal pain - treated symptomatically with improved symptoms      DISPOSITION AND DISCUSSIONS  Escalation of care considered, and ultimately not performed:diagnostic imaging - recent CT scan a few days ago    Decision tools and prescription drugs considered including, but not limited to: Pain Medications patient needs to follow up with established provider regarding ongoing pain medication .    The patient will return for new or worsening symptoms and is stable at the time of discharge.    The patient is referred to a primary physician for blood pressure management, diabetic screening, and for all other preventative health concerns.    DISPOSITION:  Patient will be discharged home in stable condition.    FOLLOW UP:  Fidencio Christopher D.O.  [address] [place] [id number] [phone]    Schedule an appointment as soon as possible for a visit       [facility], Emergency Dept  [address] [facility] [address] [phone]    If symptoms worsen    FINAL DIAGNOSIS  1. Syncope, unspecified syncope type    2. Dehydration    3. Chronic abdominal pain        The note accurately reflects work and decisions made by me.  Sapphire Carlos M.D.  6/29/2023  9:49 PM     JESSE  Nia Sin (Thomasibvannesa), am scribing for, and in the presence of, Sapphire Carlos M.D..    Electronically signed by: Nia Sin (Ruby), 6/29/2023    Sapphire MOLINA M.D. personally performed the services described in this documentation, as scribed by Nia Sin in my presence, and it is both accurate and complete.

## 2023-06-29 NOTE — ED NOTES
Unable to obtain IV access x3 d/t poor peripheral vasculature. Another RN to attempt with ultrasound when available.

## 2023-06-29 NOTE — DISCHARGE INSTRUCTIONS
You were seen in the Emergency Department following syncopal episode.    EKG, labs, xray were completed without significant acute abnormalities other than signs of mild dehydration.    Please continue home medications.  Increase free water through feeding tube on days that water intake is not adequate.    Please follow up with your primary care physician.    Return to the Emergency Department with other new concerns.

## 2023-06-29 NOTE — ED TRIAGE NOTES
"Chief Complaint   Patient presents with    Syncope     BIBA for syncopal episode. Pt states she had dizziness before passing out. Pt family witnessed it. +LOC, hit head. No blood thinners. Recent gtube placement. Pt also states chronic nausea and abdominal pain since stomach rupture in 2019. Blood sugar 135     BP 91/64   Pulse 98   Temp 37.4 °C (99.4 °F) (Temporal)   Resp 12   Ht 1.6 m (5' 3\")   Wt 36.3 kg (80 lb)   LMP 09/21/2011   SpO2 96%   BMI 14.17 kg/m²     Pt axox4, GCS 15.   "

## 2023-06-29 NOTE — ED NOTES
08/24/18 0841   Wound Leg Lower Anterior;Right   Date First Assessed/Time First Assessed: 08/15/18 1424   POA: Yes  Wound Type: Blister/bullae  Location: Leg Lower  Orientation: Anterior;Right   DRESSING STATUS Old drainage;Removed   DRESSING TYPE Silver products;Gauze   Wound Length (cm) 4 cm   Wound Width (cm) 4.5 cm   Wound Depth (cm) 0.1   Wound Surface area (cm^2) 18 cm^2   Condition of Base Pink;Slough   Condition of Edges Open   Tissue Type Yellow;Pink   Drainage Amount  Small    Drainage Color Serosanguinous   Wound Odor None   Periwound Skin Condition Edematous; Erythema, blanchable   Cleansing and Cleansing Agents  Normal saline   Wound Leg Lower Left; Anterior   Date First Assessed/Time First Assessed: 08/10/18 0836   POA: Yes  Wound Type: Venous  Location: Leg Lower  Orientation: Left; Anterior   Wound Length (cm) 0.1 cm   Wound Width (cm) 0.1 cm   Wound Depth (cm) 0.1   Wound Surface area (cm^2) 0.01 cm^2   Condition of Base Epithelializing   Condition of Edges Closed   Drainage Amount  None   Wound Odor None   Wound Leg Right;Medial   Date First Assessed/Time First Assessed: 08/03/18 0817   POA: Yes  Wound Type: Venous  Location: Leg  Orientation: Right;Medial   Wound Length (cm) 0.1 cm   Wound Width (cm) 0.1 cm   Wound Depth (cm) 0.1   Wound Surface area (cm^2) 0.01 cm^2   Condition of Base Epithelializing   Condition of Edges Closed   Drainage Amount  None   Wound Odor None   Periwound Skin Condition Erythema, blanchable;Edematous   Cleansing and Cleansing Agents  Normal saline Xray techs unable to complete pt xrays as pt moving around and dry heaving. Pt states her pain comes and go. No dry heaving noted since xray techs left room. MD notified.

## 2023-07-27 ENCOUNTER — OFFICE VISIT (OUTPATIENT)
Dept: PHYSICAL MEDICINE AND REHAB | Facility: MEDICAL CENTER | Age: 53
End: 2023-07-27
Payer: MEDICAID

## 2023-07-27 VITALS
BODY MASS INDEX: 14.71 KG/M2 | DIASTOLIC BLOOD PRESSURE: 62 MMHG | HEART RATE: 131 BPM | OXYGEN SATURATION: 98 % | HEIGHT: 63 IN | TEMPERATURE: 99 F | WEIGHT: 83 LBS | SYSTOLIC BLOOD PRESSURE: 86 MMHG

## 2023-07-27 DIAGNOSIS — M06.9 RHEUMATOID ARTHRITIS INVOLVING BOTH HANDS, UNSPECIFIED WHETHER RHEUMATOID FACTOR PRESENT (HCC): ICD-10-CM

## 2023-07-27 DIAGNOSIS — G89.4 CHRONIC PAIN SYNDROME: ICD-10-CM

## 2023-07-27 DIAGNOSIS — Z91.81 AT RISK FOR FALLS: ICD-10-CM

## 2023-07-27 DIAGNOSIS — F11.90 CHRONIC, CONTINUOUS USE OF OPIOIDS: ICD-10-CM

## 2023-07-27 DIAGNOSIS — F32.A DEPRESSION, UNSPECIFIED DEPRESSION TYPE: ICD-10-CM

## 2023-07-27 DIAGNOSIS — M05.79 RHEUMATOID ARTHRITIS INVOLVING MULTIPLE SITES WITH POSITIVE RHEUMATOID FACTOR (HCC): ICD-10-CM

## 2023-07-27 DIAGNOSIS — R62.7 FAILURE TO THRIVE IN ADULT: ICD-10-CM

## 2023-07-27 PROCEDURE — 99214 OFFICE O/P EST MOD 30 MIN: CPT | Performed by: PHYSICAL MEDICINE & REHABILITATION

## 2023-07-27 PROCEDURE — 3074F SYST BP LT 130 MM HG: CPT | Performed by: PHYSICAL MEDICINE & REHABILITATION

## 2023-07-27 PROCEDURE — 3078F DIAST BP <80 MM HG: CPT | Performed by: PHYSICAL MEDICINE & REHABILITATION

## 2023-07-27 RX ORDER — OXYCODONE AND ACETAMINOPHEN 7.5; 325 MG/1; MG/1
1 TABLET ORAL EVERY 6 HOURS PRN
Qty: 120 TABLET | Refills: 0 | Status: SHIPPED | OUTPATIENT
Start: 2023-07-27 | End: 2023-08-14

## 2023-07-27 RX ORDER — OXYCODONE AND ACETAMINOPHEN 7.5; 325 MG/1; MG/1
1 TABLET ORAL EVERY 6 HOURS PRN
Qty: 120 TABLET | Refills: 0 | Status: SHIPPED | OUTPATIENT
Start: 2023-08-26 | End: 2023-09-25

## 2023-07-27 RX ORDER — OXYCODONE AND ACETAMINOPHEN 7.5; 325 MG/1; MG/1
1 TABLET ORAL EVERY 6 HOURS PRN
Qty: 120 TABLET | Refills: 0 | Status: ON HOLD | OUTPATIENT
Start: 2023-09-25 | End: 2023-08-17

## 2023-07-27 ASSESSMENT — FIBROSIS 4 INDEX: FIB4 SCORE: 0.24

## 2023-07-27 NOTE — PROGRESS NOTES
Follow up patient note  Pain Medicine, Interventional spine and sports physiatry, Physical medicine rehabilitation    Date of Service: 07/27/2023    Chief complaint:   Joint pain and neck pain      HISTORY    Please see new patient note dated 06/08/2018 by Dr Kerr,  for more details.     HPI  Patient identification: Mary Martinez 53 y.o. female with RA involving multiple joints, atlantoaxial instability returns for follow-up of her pain related to rheumatoid arthritis.      Interval history:   Mary has had a PEG tube placed.  Unfortunately, she is having pain with using it.  She has lost almost 20 pounds since she was last here a month ago.  Follow-up with her PCP has been difficult to arrange with her current addition.  GI follow-up is still pending.    Overall, she continues to have multiple joint pains at baseline.  Neck pain shoulder pain are ongoing.  Abdominal pain has been worse as of late.Pain was reasonably managed with Percocet 7.5/325 4 times a day.  This has helped manage her pain.    Brushing her hair and putting on shoes is difficult.  Getting dressed upper and lower body.      Some walking now, using her walker.      Bowel movements are regular.      Dr. Dela Cruz is her Rheumatologist.    Reports that she works with Mckenzie at [facility] and that she will have a new PCP as Dr. Christopher left [facility].  She has a new PCP there, but reports that she sees a different provider each time she has an appointment.    Works with [facility] for behavioral health.  Duloxetine caused side effects and she has resumed paxil.    PHQ-9: 14 denies suicidality, continues to work with her psychology and psychiatry team  ORT: 4      Records review:  ED on 06/26/2023  Hospitalization 05/25/2023-06/13/2023 Failure to thrive, admitted after altered mental status, HASMUKH and UTI.    Hospitalized for severe sepsis due to recurrent urinary tract infection.  Reviewed discharged  from Dr. Mann 02/17/2023-02/19/2023    ROS  See HPI    Red Flags :   Fever, Chills, Sweats: Denies  Involuntary Weight Loss: Denies  Bowel/Bladder Incontinence: Denies      PMHx:   Past Medical History:   Diagnosis Date    Acute renal failure (ARF) (HCC)     Allergy     Anemia     Anxiety     Arthritis     Rheumatoid -follows with rheumatologist. Has several fractures due to RA.    ASTHMA     inhalers prn    Blood transfusion without reported diagnosis     Bowel habit changes     4/24/20-constipation and diarrhea.    Bronchitis last approx 2018    Chronic pain 04/24/2020    Due to RA    Dental disorder     no teeth upper-does not wear her denture. Broken teeth on bottom.    Depression     GERD (gastroesophageal reflux disease)     GIB (gastrointestinal bleeding)     Head ache     Heart burn     taking famotidine    Hiatus hernia syndrome     History of pancreatitis     Hypokalemia     Hyponatremia     Idiopathic chronic pancreatitis (HCC)     Indigestion     taking famotidine    Intractable nausea and vomiting     Kidney disease     Pain     CPS-everywhere    Pneumonia 10/2019    PONV (postoperative nausea and vomiting)     Psychiatric problem     anxiety and depression    Rheumatoid aortitis     Rheumatoid arthritis(714.0) 2003    SMAS (superior mesenteric artery syndrome) ([facility])     Substance abuse ([facility])        PSHx:   Past Surgical History:   Procedure Laterality Date    MN PLACE PERCUT GASTROSTOMY TUBE N/A 6/12/2023    Procedure: INSERTION, PEG TUBE - PEG AND J TUBE PLACEMENT;  Surgeon: Marilyn Anderson M.D.;  Location: SURGERY SAME DAY [facility];  Service: Gastroenterology    MN UPPER GI ENDOSCOPY,DIAGNOSIS  6/12/2023    Procedure: GASTROSCOPY;  Surgeon: Marilyn Anderson M.D.;  Location: SURGERY SAME DAY [facility];  Service: Gastroenterology    MN UPPER GI ENDOSCOPY,DIAGNOSIS N/A 9/9/2022    Procedure: ENDOSCOPIC ULTRASOUND- UPPER;  Surgeon: Jorge Brooke M.D.;  Location: SURGERY [facility];   Service: EUS    EGD W/ENDOSCOPIC ULTRASOUND N/A 9/9/2022    Procedure: EGD, WITH ENDOSCOPIC US;  Surgeon: Jorge Brooke M.D.;  Location: [facility];  Service: EUS    OR ENDOSCOPIC US EXAM, ESOPH  4/29/2020    Procedure: EGD, WITH ENDOSCOPIC US;  Surgeon: Jorge Brooke M.D.;  Location: [facility];  Service: Gastroenterology    GASTROSCOPY-ENDO  4/29/2020    Procedure: GASTROSCOPY;  Surgeon: Jorge Brooke M.D.;  Location: [facility];  Service: Gastroenterology    EGD WITH ASP/BX  4/29/2020    Procedure: EGD, WITH ASPIRATION BIOPSY - POSS FNA;  Surgeon: Jorge Brooke M.D.;  Location: [facility];  Service: Gastroenterology    OR EXPLORATORY OF ABDOMEN N/A 10/4/2019    Procedure: LAPAROTOMY, EXPLORATORY AND REPAIR OF DUODENAL PERFORATION;  Surgeon: James Dumont M.D.;  Location: [facility];  Service: General    COLONOSCOPY  2018    with upper endoscopy    FINGER ARTHROPLASTY Right 6/5/2017    Procedure: FINGER ARTHROPLASTY - LONG, RING AND SMALL VOLAR PLATE;  Surgeon: Lobo Rosen M.D.;  Location: SURGERY SAME DAY [facility];  Service:     FINGER AMPUTATION  6/5/2017    Procedure: FINGER AMPUTATION - LONG, RING AND SMALL AT THE PROXIMAL INTERPHALANGEAL JOINT;  Surgeon: Lobo Rosen M.D.;  Location: SURGERY SAME DAY [facility];  Service:     FINGER ARTHROPLASTY Right 4/17/2017    Procedure: FINGER ARTHROPLASTY ;  Surgeon: Lobo Rosen M.D.;  Location: SURGERY SAME DAY [facility];  Service:     FINGER AMPUTATION Right 9/14/2016    Procedure: FINGER AMPUTATION INDEX;  Surgeon: Lobo Rosen M.D.;  Location: SURGERY SAME DAY [facility];  Service:     IRRIGATION & DEBRIDEMENT ORTHO Right 9/11/2016    Procedure: IRRIGATION & DEBRIDEMENT ORTHO - right index finger;  Surgeon: Madhu Rosen M.D.;  Location: [facility];  Service:     FUSION, PIP JOINT, TOE Right  8/29/2016    Procedure: RE-DO INDEX FINGER PROXIMAL INTERPHALANGEAL ARTHRODESIS;  Surgeon: Lobo Rosen M.D.;  Location: SURGERY SAME DAY [facility];  Service:     BONE GRAFT Right 8/29/2016    Procedure: BONE GRAFT - DISTAL RADIUS ;  Surgeon: Lobo Rosen M.D.;  Location: SURGERY SAME DAY [facility];  Service:     ARTHRODESIS Right 5/6/2016    Procedure: ARTHRODESIS INDEX FINGER PROXIMAL INTERPHALANGEAL;  Surgeon: Lobo Rosen M.D.;  Location: SURGERY SAME DAY [facility];  Service:     FOOT RECONSTRUCTION RHEUMATIC Left 7/29/2015    Procedure: FOOT RECONSTRUCTION RHEUMATIC;  Surgeon: Heriberto Alves M.D.;  Location: SURGERY [facility];  Service:     TOE FUSION Right 5/27/2015    Procedure: TOE FUSION 1ST METATARSALPHALANGEAL JOINT;  Surgeon: Heriberto Alves M.D.;  Location: SURGERY [facility];  Service:     BONE SPUR EXCISION  5/27/2015    Procedure: BONE SPUR EXCISION METATARSAL HEAD 2-3;  Surgeon: Heriberto Alves M.D.;  Location: SURGERY [facility];  Service:     HAMMERTOE CORRECTION  5/27/2015    Procedure: HAMMERTOE CORRECTION AND SOFT TISSUE RE-ALINGMENT 2-3 ;  Surgeon: Heriberto Alves M.D.;  Location: SURGERY [facility];  Service:     DENTAL EXTRACTION(S)  2014    all of upper teeth    ABDOMINAL ABSCESS DRAINAGE  9/27/2011    Performed by VERO WRIGHT at SURGERY [facility]    EMANUEL BY LAPAROSCOPY  9/27/2011    Performed by VERO WRIGHT at [facility]    APPENDECTOMY  2011    Found out it had ruptured prior to abcess surgery    REPEAT C SECTION  2008    REPEAT C SECTION  2005    REPEAT C SECTION  1999    PRIMARY C SECTION  1991    TONSILLECTOMY  1982    WRIST EXPLORATION Left 1980's    fractured wrist-no hardware       Family history   Family History   Problem Relation Age of Onset    Cancer Mother     Heart Disease Mother     Hypertension Mother     Heart Disease Father     Hypertension Father          Medications:   Outpatient  Medications Marked as Taking for the 7/27/23 encounter (Office Visit) with Jayy Kerr M.D.   Medication Sig Dispense Refill    oxyCODONE-acetaminophen (PERCOCET) 7.5-325 MG per tablet Take 1 Tablet by mouth every 6 hours as needed for Severe Pain for up to 30 days. 120 Tablet 0    [START ON 8/26/2023] oxyCODONE-acetaminophen (PERCOCET) 7.5-325 MG per tablet Take 1 Tablet by mouth every 6 hours as needed for Severe Pain for up to 30 days. 120 Tablet 0    [START ON 9/25/2023] oxyCODONE-acetaminophen (PERCOCET) 7.5-325 MG per tablet Take 1 Tablet by mouth every 6 hours as needed for Severe Pain for up to 30 days. 120 Tablet 0       Allergies:   Allergies   Allergen Reactions    Penicillins Anaphylaxis and Hives     Tolerates cephalosporins; reports throat swelling with PCN    Aripiprazole Nausea     Spasms, shaking      Nitrous Oxide Vomiting       Social Hx:   Social History     Socioeconomic History    Marital status:      Spouse name: Ousmane    Number of children: 5    Years of education: Not on file    Highest education level: Not on file   Occupational History    Not on file   Tobacco Use    Smoking status: Every Day     Packs/day: 0.50     Years: 30.00     Pack years: 15.00     Types: Cigarettes    Smokeless tobacco: Never   Vaping Use    Vaping Use: Never used   Substance and Sexual Activity    Alcohol use: Never    Drug use: Never    Sexual activity: Not Currently   Other Topics Concern     Service No    Blood Transfusions Yes    Caffeine Concern No    Occupational Exposure No    Hobby Hazards No    Sleep Concern No    Stress Concern Yes    Weight Concern No    Special Diet No    Back Care No    Exercise Yes    Bike Helmet Yes    Seat Belt Yes    Self-Exams Yes   Social History Narrative    ** Merged History Encounter **          Social Determinants of Health     Financial Resource Strain: Low Risk  (9/2/2022)    Overall Financial Resource Strain (CARDIA)     Difficulty of Paying Living  "Expenses: Not hard at all   Food Insecurity: No Food Insecurity (9/2/2022)    Hunger Vital Sign     Worried About Running Out of Food in the Last Year: Never true     Ran Out of Food in the Last Year: Never true   Transportation Needs: No Transportation Needs (9/2/2022)    PRAPARE - Transportation     Lack of Transportation (Medical): No     Lack of Transportation (Non-Medical): No   Physical Activity: Not on file   Stress: Not on file   Social Connections: Not on file   Intimate Partner Violence: Not on file   Housing Stability: Not on file         EXAMINATION     Physical Exam:   Vitals: BP (!) 86/62 (BP Location: Right arm, Patient Position: Sitting, BP Cuff Size: Adult)   Pulse (!) 131   Temp 37.2 °C (99 °F) (Temporal)   Ht 1.6 m (5' 3\")   Wt 37.6 kg (83 lb)   SpO2 98%     Constitutional:   Body Habitus: Body mass index is 14.7 kg/m².  Cooperation: Fully cooperates with exam  Appearance: Appears pale, thin.    Respiratory-  breathing comfortable on room air, no audible wheezing  Cardiovascular- no lower extremity edema is observed.     Psychiatric- alert and oriented ×3. Normal affect.     Musculoskeletal:    Partial hand amputation on the right at the MCPs; ulnar deviation on the left with MCP subluxation, mild atrophy of the hand intrinsics with wasting of the thenar eminence. No warmth or erythema over left MCP there is a soft area of swelling    Gait is steady with use of walker.  Mildly antalgic gait    MEDICAL DECISION MAKING    DATA    Labs: UDS 10/30/2018 consistent with medications.  Oxycodone and metabolites without other substances   UDS 04/19/2019 consistent with medications. Oxycodone and metabolites without other substances   UDS 07/19/2019 consistent with medications.  Oxycodone and metabolites without other substances   UDS 11/22/2019 consistent with medications.  Oxycodone and metabolites without other substances   UDS 02/20/2020 absent for Oxycodone and metabolites without other " substances  UDS 06/10/2020 positive for oxycodone and metabolites without other substances  UDS 08/18/2020 positive for oxycodone and metabolites, positive for EtG without EtS  UDS 09/07/2021 negative for oxycodone and metabolites, patient was out of medication, no other abnormalities  UDS 05/18/2022: positive for oxycodone and metabolites.    UDS 02/24/2023: positive for noroxycodone, negative for other metabolites.  No other substances    Imaging:    Films reviewed.  These are my reads:    Xray right shoulder 08/20/2020  There is note of severe degenerative change of the glenohumeral joint.  No fracture.  Erosive changes, consistent with known history of RA    MRI cervical spine 07/31/2018:    There is is note of motion at the atlantodental level with 5mm atlantodental inverval in flexion that reduces to 1-2 mm in extension.  Mild retrolisthesis of C4 on C5 and anterolisthesis of C5- on C6.  Disc extrusion at C6-7 with mild central canal stenosis    Xray left shoulder 2/5/2018 Humeral head is elevated.  Glenohumeral joint arthritis.    Reports reviewed:    Xray right shoulder 08/20/2020 [facility]  Impression  Extensive erosive changes of the humeral head and degenerative changes of the glenohumeral joint.  Findings are concerning for inflammatory arthropathy such as rheumatoid arthritis.      Xray cervical spine 11/14/2018  1. No acute fracture  2. Persistent motion at the C1-2 joint as before, suggesting atlantoaxial instability  3. Minimal instability noted at C5-6 level as described.    MRI cervical spine 07/31/2018  1. Widening of the atlantodental interal in neutral and flexion positions with a 5mm space which reduces to 1-2 mm in extension  2. Degenerative changes with the disc extrusion at C6-7 level causing mild canal stenosis    Xray cervical spine 07/06/2018: Atlantoaxial instability at C1-2     Xrays knees 07/06/2018  Left: Unremarkable  Right: Unremarkable             DIAGNOSIS   Visit Diagnoses      ICD-10-CM   1. Failure to thrive in adult  R62.7   2. At risk for falls  Z91.81   3. Rheumatoid arthritis involving both hands, unspecified whether rheumatoid factor present (HCC)  M06.9   4. Rheumatoid arthritis involving multiple sites with positive rheumatoid factor (HCC)  M05.79   5. Chronic, continuous use of opioids  F11.90   6. Chronic pain syndrome  G89.4   7. Depression, unspecified depression type  F32.A                   ASSESSMENT and PLAN:     Mary Martinez 53 y.o. female with rheumatoid arthritis    Mary was seen today for follow-up.    Orders and management for this visit:    Orders Placed This Encounter    Referral to Home Health    Referral to establish with [facility] PCP    Patient has been identified as having a positive depression screening. Appropriate orders and counseling have been given.    oxyCODONE-acetaminophen (PERCOCET) 7.5-325 MG per tablet    oxyCODONE-acetaminophen (PERCOCET) 7.5-325 MG per tablet    oxyCODONE-acetaminophen (PERCOCET) 7.5-325 MG per tablet    Consent for Opiate Prescription    Controlled Substance Treatment Agreement     Discussed referral to home health.  They were not taking her insurance at the time of the last referral placed.  I have placed this referral again.    Referral placed to establish care with a [facility] PCP.  Discussed follow-up with GI.   reviewed.  Most recent UDS reviewed.  Plan for percocet 7.5/325 up to 4/day.  Script given for the next three consecutive months.    Her pain has been refractory to physical therapy, other medications and has been on chronic, stable opioids.  Continue with behavioral health.    In prescribing controlled substances to this patient, I certify that I have obtained and reviewed the medical history of Mary Martinez. I have also made a good aristides effort to obtain applicable records from other providers who have treated the patient and records did not demonstrate any increased risk of substance abuse that  would prevent me from prescribing controlled substances.     I have conducted a physical exam and documented it. I have reviewed Ms. Martinez’s prescription history as maintained by the [place] Prescription Monitoring Program.   ORT 4, indicates moderate risk    I have assessed the patient’s risk for abuse, dependency, and addiction using the validated Opioid Risk Tool available at https://www.mdcalc.com/mlseum-dnxf-mhnd-ort-narcotic-abuse.     Given the above, I believe the benefits of controlled substance therapy outweigh the risks. The reasons for prescribing controlled substances include non-narcotic, oral analgesic alternatives have been inadequate for pain control and in my professional opinion, controlled substances are the only reasonable choice for this patient because her pain was note adequately controlled with alternatives  and she has chronic progressive disease. Accordingly, I have discussed the risk and benefits, treatment plan, and alternative therapies with the patient.       Follow up:3 months, prn worsening of pain    My total time spent caring for the patient on the day of the encounter was 41 minutes.   This does not include time spent on separately billable procedures/tests.      Please note that this dictation was created using voice recognition software. I have made every reasonable attempt to correct obvious errors but there may be errors of grammar and content that I may have overlooked prior to finalization of this note.      Jayy Kerr MD  Interventional Spine and Sports Physiatry  Physical Medicine and Rehabilitation  [facility]

## 2023-08-10 NOTE — FACE TO FACE
Face to Face Supporting Documentation - Home Health    The encounter with this patient was in whole or in part the primary reason for home health admission.    Date of encounter:   Patient:                    MRN:                       YOB: 2021  Mary Martinez  [id number]  1970     Home health to see patient for:  Skilled Nursing care for assessment, interventions & education, Physical Therapy evaluation and treatment, and Occupational therapy evaluation and treatment    Skilled need for:  Comment: SMA syndrome    Skilled nursing interventions to include:  Comment: PTOT    Homebound status evidenced by:  Needs the assistance of another person in order to leave the home. Leaving home requires a considerable and taxing effort. There is a normal inability to leave the home.    Community Physician to provide follow up care: Fidencio Christopher D.O.     Optional Interventions? No      I certify the face to face encounter for this home health care referral meets the CMS requirements and the encounter/clinical assessment with the patient was, in whole, or in part, for the medical condition(s) listed above, which is the primary reason for home health care. Based on my clinical findings: the service(s) are medically necessary, support the need for home health care, and the homebound criteria are met.  I certify that this patient has had a face to face encounter by myself.  Krysta Arana M.D. - NPI: [id number]  
10-Aug-2023 18:32

## 2023-08-14 ENCOUNTER — APPOINTMENT (OUTPATIENT)
Dept: RADIOLOGY | Facility: MEDICAL CENTER | Age: 53
DRG: 640 | End: 2023-08-14
Attending: EMERGENCY MEDICINE
Payer: MEDICAID

## 2023-08-14 ENCOUNTER — HOSPITAL ENCOUNTER (INPATIENT)
Facility: MEDICAL CENTER | Age: 53
LOS: 3 days | DRG: 640 | End: 2023-08-17
Attending: EMERGENCY MEDICINE | Admitting: STUDENT IN AN ORGANIZED HEALTH CARE EDUCATION/TRAINING PROGRAM
Payer: MEDICAID

## 2023-08-14 DIAGNOSIS — D53.9 MACROCYTIC ANEMIA: ICD-10-CM

## 2023-08-14 DIAGNOSIS — R10.9 ABDOMINAL PAIN, UNSPECIFIED ABDOMINAL LOCATION: ICD-10-CM

## 2023-08-14 DIAGNOSIS — E43 SEVERE MALNUTRITION (HCC): ICD-10-CM

## 2023-08-14 DIAGNOSIS — R11.2 NAUSEA AND VOMITING, UNSPECIFIED VOMITING TYPE: ICD-10-CM

## 2023-08-14 DIAGNOSIS — E83.39 HYPOPHOSPHATEMIA: ICD-10-CM

## 2023-08-14 DIAGNOSIS — T73.0XXA STARVATION KETOACIDOSIS: ICD-10-CM

## 2023-08-14 DIAGNOSIS — E87.6 HYPOKALEMIA: ICD-10-CM

## 2023-08-14 DIAGNOSIS — E86.0 DEHYDRATION: ICD-10-CM

## 2023-08-14 DIAGNOSIS — E87.29 STARVATION KETOACIDOSIS: ICD-10-CM

## 2023-08-14 PROBLEM — K83.8 COMMON BILE DUCT DILATION: Status: ACTIVE | Noted: 2023-08-14

## 2023-08-14 LAB
ALBUMIN SERPL BCP-MCNC: 4.1 G/DL (ref 3.2–4.9)
ALBUMIN/GLOB SERPL: 1.2 G/DL
ALP SERPL-CCNC: 120 U/L (ref 30–99)
ALT SERPL-CCNC: <5 U/L (ref 2–50)
ANION GAP SERPL CALC-SCNC: 18 MMOL/L (ref 7–16)
APPEARANCE UR: CLEAR
AST SERPL-CCNC: 12 U/L (ref 12–45)
B-OH-BUTYR SERPL-MCNC: 0.21 MMOL/L (ref 0.02–0.27)
BACTERIA #/AREA URNS HPF: ABNORMAL /HPF
BASOPHILS # BLD AUTO: 0.7 % (ref 0–1.8)
BASOPHILS # BLD: 0.08 K/UL (ref 0–0.12)
BILIRUB SERPL-MCNC: 0.2 MG/DL (ref 0.1–1.5)
BILIRUB UR QL STRIP.AUTO: NEGATIVE
BUN SERPL-MCNC: 30 MG/DL (ref 8–22)
CALCIUM ALBUM COR SERPL-MCNC: 8.8 MG/DL (ref 8.5–10.5)
CALCIUM SERPL-MCNC: 8.9 MG/DL (ref 8.5–10.5)
CHLORIDE SERPL-SCNC: 106 MMOL/L (ref 96–112)
CO2 SERPL-SCNC: 10 MMOL/L (ref 20–33)
COLOR UR: YELLOW
CREAT SERPL-MCNC: 0.97 MG/DL (ref 0.5–1.4)
EKG IMPRESSION: NORMAL
EOSINOPHIL # BLD AUTO: 0.08 K/UL (ref 0–0.51)
EOSINOPHIL NFR BLD: 0.7 % (ref 0–6.9)
EPI CELLS #/AREA URNS HPF: ABNORMAL /HPF
ERYTHROCYTE [DISTWIDTH] IN BLOOD BY AUTOMATED COUNT: 50.7 FL (ref 35.9–50)
FERRITIN SERPL-MCNC: 26.1 NG/ML (ref 10–291)
GFR SERPLBLD CREATININE-BSD FMLA CKD-EPI: 71 ML/MIN/1.73 M 2
GLOBULIN SER CALC-MCNC: 3.3 G/DL (ref 1.9–3.5)
GLUCOSE SERPL-MCNC: 212 MG/DL (ref 65–99)
GLUCOSE UR STRIP.AUTO-MCNC: NEGATIVE MG/DL
HCT VFR BLD AUTO: 31.6 % (ref 37–47)
HGB BLD-MCNC: 10.4 G/DL (ref 12–16)
HGB RETIC QN AUTO: 33.3 PG/CELL (ref 29–35)
HYALINE CASTS #/AREA URNS LPF: ABNORMAL /LPF
IMM GRANULOCYTES # BLD AUTO: 0.05 K/UL (ref 0–0.11)
IMM GRANULOCYTES NFR BLD AUTO: 0.5 % (ref 0–0.9)
IMM RETICS NFR: 9.3 % (ref 2.6–16.1)
IRON SATN MFR SERPL: 9 % (ref 15–55)
IRON SERPL-MCNC: 25 UG/DL (ref 40–170)
KETONES UR STRIP.AUTO-MCNC: NEGATIVE MG/DL
LACTATE SERPL-SCNC: 1.5 MMOL/L (ref 0.5–2)
LACTATE SERPL-SCNC: 2.6 MMOL/L (ref 0.5–2)
LEUKOCYTE ESTERASE UR QL STRIP.AUTO: ABNORMAL
LYMPHOCYTES # BLD AUTO: 1.66 K/UL (ref 1–4.8)
LYMPHOCYTES NFR BLD: 15.3 % (ref 22–41)
MAGNESIUM SERPL-MCNC: 2.2 MG/DL (ref 1.5–2.5)
MCH RBC QN AUTO: 31.9 PG (ref 27–33)
MCHC RBC AUTO-ENTMCNC: 32.9 G/DL (ref 32.2–35.5)
MCV RBC AUTO: 96.9 FL (ref 81.4–97.8)
MICRO URNS: ABNORMAL
MONOCYTES # BLD AUTO: 0.31 K/UL (ref 0–0.85)
MONOCYTES NFR BLD AUTO: 2.9 % (ref 0–13.4)
NEUTROPHILS # BLD AUTO: 8.66 K/UL (ref 1.82–7.42)
NEUTROPHILS NFR BLD: 79.9 % (ref 44–72)
NITRITE UR QL STRIP.AUTO: NEGATIVE
NRBC # BLD AUTO: 0 K/UL
NRBC BLD-RTO: 0 /100 WBC (ref 0–0.2)
PH UR STRIP.AUTO: 6.5 [PH] (ref 5–8)
PHOSPHATE SERPL-MCNC: 1.9 MG/DL (ref 2.5–4.5)
PLATELET # BLD AUTO: 435 K/UL (ref 164–446)
PMV BLD AUTO: 9.9 FL (ref 9–12.9)
POTASSIUM SERPL-SCNC: 2.3 MMOL/L (ref 3.6–5.5)
PROT SERPL-MCNC: 7.4 G/DL (ref 6–8.2)
PROT UR QL STRIP: 30 MG/DL
RBC # BLD AUTO: 3.26 M/UL (ref 4.2–5.4)
RBC # URNS HPF: ABNORMAL /HPF
RBC UR QL AUTO: ABNORMAL
RETICS # AUTO: 0.06 M/UL (ref 0.04–0.12)
RETICS/RBC NFR: 2 % (ref 0.8–2.6)
SODIUM SERPL-SCNC: 134 MMOL/L (ref 135–145)
SP GR UR STRIP.AUTO: 1.02
TIBC SERPL-MCNC: 276 UG/DL (ref 250–450)
TROPONIN T SERPL-MCNC: 30 NG/L (ref 6–19)
UIBC SERPL-MCNC: 251 UG/DL (ref 110–370)
UROBILINOGEN UR STRIP.AUTO-MCNC: 0.2 MG/DL
WBC # BLD AUTO: 10.8 K/UL (ref 4.8–10.8)
WBC #/AREA URNS HPF: ABNORMAL /HPF

## 2023-08-14 PROCEDURE — 83605 ASSAY OF LACTIC ACID: CPT | Mod: 91

## 2023-08-14 PROCEDURE — 85025 COMPLETE CBC W/AUTO DIFF WBC: CPT

## 2023-08-14 PROCEDURE — 83550 IRON BINDING TEST: CPT

## 2023-08-14 PROCEDURE — 84100 ASSAY OF PHOSPHORUS: CPT

## 2023-08-14 PROCEDURE — 93005 ELECTROCARDIOGRAM TRACING: CPT | Performed by: EMERGENCY MEDICINE

## 2023-08-14 PROCEDURE — 36415 COLL VENOUS BLD VENIPUNCTURE: CPT

## 2023-08-14 PROCEDURE — 83735 ASSAY OF MAGNESIUM: CPT

## 2023-08-14 PROCEDURE — 87086 URINE CULTURE/COLONY COUNT: CPT

## 2023-08-14 PROCEDURE — 82728 ASSAY OF FERRITIN: CPT

## 2023-08-14 PROCEDURE — 96368 THER/DIAG CONCURRENT INF: CPT

## 2023-08-14 PROCEDURE — 700111 HCHG RX REV CODE 636 W/ 250 OVERRIDE (IP): Performed by: STUDENT IN AN ORGANIZED HEALTH CARE EDUCATION/TRAINING PROGRAM

## 2023-08-14 PROCEDURE — 99285 EMERGENCY DEPT VISIT HI MDM: CPT

## 2023-08-14 PROCEDURE — 85046 RETICYTE/HGB CONCENTRATE: CPT

## 2023-08-14 PROCEDURE — 700102 HCHG RX REV CODE 250 W/ 637 OVERRIDE(OP): Performed by: STUDENT IN AN ORGANIZED HEALTH CARE EDUCATION/TRAINING PROGRAM

## 2023-08-14 PROCEDURE — 700102 HCHG RX REV CODE 250 W/ 637 OVERRIDE(OP): Mod: JZ,UD | Performed by: EMERGENCY MEDICINE

## 2023-08-14 PROCEDURE — 83540 ASSAY OF IRON: CPT

## 2023-08-14 PROCEDURE — C9113 INJ PANTOPRAZOLE SODIUM, VIA: HCPCS | Performed by: STUDENT IN AN ORGANIZED HEALTH CARE EDUCATION/TRAINING PROGRAM

## 2023-08-14 PROCEDURE — 700111 HCHG RX REV CODE 636 W/ 250 OVERRIDE (IP): Performed by: EMERGENCY MEDICINE

## 2023-08-14 PROCEDURE — 84484 ASSAY OF TROPONIN QUANT: CPT

## 2023-08-14 PROCEDURE — 81001 URINALYSIS AUTO W/SCOPE: CPT

## 2023-08-14 PROCEDURE — A9270 NON-COVERED ITEM OR SERVICE: HCPCS | Performed by: STUDENT IN AN ORGANIZED HEALTH CARE EDUCATION/TRAINING PROGRAM

## 2023-08-14 PROCEDURE — 96367 TX/PROPH/DG ADDL SEQ IV INF: CPT

## 2023-08-14 PROCEDURE — 93005 ELECTROCARDIOGRAM TRACING: CPT

## 2023-08-14 PROCEDURE — 74176 CT ABD & PELVIS W/O CONTRAST: CPT

## 2023-08-14 PROCEDURE — 700105 HCHG RX REV CODE 258: Performed by: STUDENT IN AN ORGANIZED HEALTH CARE EDUCATION/TRAINING PROGRAM

## 2023-08-14 PROCEDURE — 71045 X-RAY EXAM CHEST 1 VIEW: CPT

## 2023-08-14 PROCEDURE — 99223 1ST HOSP IP/OBS HIGH 75: CPT | Mod: AI | Performed by: STUDENT IN AN ORGANIZED HEALTH CARE EDUCATION/TRAINING PROGRAM

## 2023-08-14 PROCEDURE — 87040 BLOOD CULTURE FOR BACTERIA: CPT

## 2023-08-14 PROCEDURE — 770020 HCHG ROOM/CARE - TELE (206)

## 2023-08-14 PROCEDURE — 700105 HCHG RX REV CODE 258: Mod: JZ,UD | Performed by: EMERGENCY MEDICINE

## 2023-08-14 PROCEDURE — 96375 TX/PRO/DX INJ NEW DRUG ADDON: CPT

## 2023-08-14 PROCEDURE — A9270 NON-COVERED ITEM OR SERVICE: HCPCS | Mod: JZ,UD | Performed by: EMERGENCY MEDICINE

## 2023-08-14 PROCEDURE — 82010 KETONE BODYS QUAN: CPT

## 2023-08-14 PROCEDURE — 80053 COMPREHEN METABOLIC PANEL: CPT

## 2023-08-14 RX ORDER — HYDROMORPHONE HYDROCHLORIDE 1 MG/ML
0.25 INJECTION, SOLUTION INTRAMUSCULAR; INTRAVENOUS; SUBCUTANEOUS
Status: DISCONTINUED | OUTPATIENT
Start: 2023-08-14 | End: 2023-08-15

## 2023-08-14 RX ORDER — MIDODRINE HYDROCHLORIDE 10 MG/1
1 TABLET ORAL 3 TIMES DAILY
COMMUNITY
End: 2023-09-14

## 2023-08-14 RX ORDER — BISACODYL 10 MG
10 SUPPOSITORY, RECTAL RECTAL
Status: DISCONTINUED | OUTPATIENT
Start: 2023-08-14 | End: 2023-08-15

## 2023-08-14 RX ORDER — HYDRALAZINE HYDROCHLORIDE 20 MG/ML
10 INJECTION INTRAMUSCULAR; INTRAVENOUS EVERY 4 HOURS PRN
Status: DISCONTINUED | OUTPATIENT
Start: 2023-08-14 | End: 2023-08-17 | Stop reason: HOSPADM

## 2023-08-14 RX ORDER — OXYCODONE HYDROCHLORIDE 5 MG/1
2.5 TABLET ORAL
Status: DISCONTINUED | OUTPATIENT
Start: 2023-08-14 | End: 2023-08-16

## 2023-08-14 RX ORDER — ONDANSETRON 4 MG/1
4 TABLET, ORALLY DISINTEGRATING ORAL EVERY 4 HOURS PRN
Status: DISCONTINUED | OUTPATIENT
Start: 2023-08-14 | End: 2023-08-17 | Stop reason: HOSPADM

## 2023-08-14 RX ORDER — PROCHLORPERAZINE EDISYLATE 5 MG/ML
5-10 INJECTION INTRAMUSCULAR; INTRAVENOUS EVERY 4 HOURS PRN
Status: DISCONTINUED | OUTPATIENT
Start: 2023-08-14 | End: 2023-08-17 | Stop reason: HOSPADM

## 2023-08-14 RX ORDER — MORPHINE SULFATE 4 MG/ML
4 INJECTION INTRAVENOUS ONCE
Status: COMPLETED | OUTPATIENT
Start: 2023-08-14 | End: 2023-08-14

## 2023-08-14 RX ORDER — GUAIFENESIN/DEXTROMETHORPHAN 100-10MG/5
10 SYRUP ORAL EVERY 6 HOURS PRN
Status: DISCONTINUED | OUTPATIENT
Start: 2023-08-14 | End: 2023-08-17 | Stop reason: HOSPADM

## 2023-08-14 RX ORDER — ACETAMINOPHEN 325 MG/1
650 TABLET ORAL EVERY 6 HOURS PRN
Status: DISCONTINUED | OUTPATIENT
Start: 2023-08-14 | End: 2023-08-17 | Stop reason: HOSPADM

## 2023-08-14 RX ORDER — QUETIAPINE FUMARATE 25 MG/1
100 TABLET, FILM COATED ORAL EVERY EVENING
Status: DISCONTINUED | OUTPATIENT
Start: 2023-08-14 | End: 2023-08-17 | Stop reason: HOSPADM

## 2023-08-14 RX ORDER — GAUZE BANDAGE 2" X 2"
100 BANDAGE TOPICAL DAILY
Status: DISCONTINUED | OUTPATIENT
Start: 2023-08-15 | End: 2023-08-17 | Stop reason: HOSPADM

## 2023-08-14 RX ORDER — ONDANSETRON 2 MG/ML
4 INJECTION INTRAMUSCULAR; INTRAVENOUS ONCE
Status: COMPLETED | OUTPATIENT
Start: 2023-08-14 | End: 2023-08-14

## 2023-08-14 RX ORDER — SODIUM CHLORIDE, SODIUM LACTATE, POTASSIUM CHLORIDE, AND CALCIUM CHLORIDE .6; .31; .03; .02 G/100ML; G/100ML; G/100ML; G/100ML
30 INJECTION, SOLUTION INTRAVENOUS ONCE
Status: COMPLETED | OUTPATIENT
Start: 2023-08-14 | End: 2023-08-14

## 2023-08-14 RX ORDER — ONDANSETRON 2 MG/ML
4 INJECTION INTRAMUSCULAR; INTRAVENOUS EVERY 4 HOURS PRN
Status: DISCONTINUED | OUTPATIENT
Start: 2023-08-14 | End: 2023-08-17 | Stop reason: HOSPADM

## 2023-08-14 RX ORDER — SODIUM CHLORIDE, SODIUM LACTATE, POTASSIUM CHLORIDE, AND CALCIUM CHLORIDE .6; .31; .03; .02 G/100ML; G/100ML; G/100ML; G/100ML
1000 INJECTION, SOLUTION INTRAVENOUS ONCE
Status: COMPLETED | OUTPATIENT
Start: 2023-08-14 | End: 2023-08-15

## 2023-08-14 RX ORDER — METRONIDAZOLE 500 MG/100ML
500 INJECTION, SOLUTION INTRAVENOUS EVERY 8 HOURS
Status: DISCONTINUED | OUTPATIENT
Start: 2023-08-14 | End: 2023-08-16

## 2023-08-14 RX ORDER — POLYETHYLENE GLYCOL 3350 17 G/17G
1 POWDER, FOR SOLUTION ORAL
Status: DISCONTINUED | OUTPATIENT
Start: 2023-08-14 | End: 2023-08-15

## 2023-08-14 RX ORDER — HEPARIN SODIUM 5000 [USP'U]/ML
5000 INJECTION, SOLUTION INTRAVENOUS; SUBCUTANEOUS EVERY 8 HOURS
Status: DISCONTINUED | OUTPATIENT
Start: 2023-08-14 | End: 2023-08-14

## 2023-08-14 RX ORDER — POTASSIUM CHLORIDE 20 MEQ/1
40 TABLET, EXTENDED RELEASE ORAL ONCE
Status: COMPLETED | OUTPATIENT
Start: 2023-08-14 | End: 2023-08-14

## 2023-08-14 RX ORDER — PROMETHAZINE HYDROCHLORIDE 25 MG/1
12.5-25 SUPPOSITORY RECTAL EVERY 4 HOURS PRN
Status: DISCONTINUED | OUTPATIENT
Start: 2023-08-14 | End: 2023-08-17 | Stop reason: HOSPADM

## 2023-08-14 RX ORDER — PAROXETINE HYDROCHLORIDE 20 MG/1
60 TABLET, FILM COATED ORAL EVERY EVENING
Status: DISCONTINUED | OUTPATIENT
Start: 2023-08-14 | End: 2023-08-17 | Stop reason: HOSPADM

## 2023-08-14 RX ORDER — POTASSIUM CHLORIDE 7.45 MG/ML
10 INJECTION INTRAVENOUS ONCE
Status: COMPLETED | OUTPATIENT
Start: 2023-08-14 | End: 2023-08-15

## 2023-08-14 RX ORDER — AMOXICILLIN 250 MG
2 CAPSULE ORAL 2 TIMES DAILY
Status: DISCONTINUED | OUTPATIENT
Start: 2023-08-15 | End: 2023-08-15

## 2023-08-14 RX ORDER — OXYCODONE HYDROCHLORIDE 5 MG/1
5 TABLET ORAL
Status: DISCONTINUED | OUTPATIENT
Start: 2023-08-14 | End: 2023-08-16

## 2023-08-14 RX ORDER — POTASSIUM CHLORIDE 7.45 MG/ML
10 INJECTION INTRAVENOUS
Status: DISPENSED | OUTPATIENT
Start: 2023-08-15 | End: 2023-08-15

## 2023-08-14 RX ORDER — PROMETHAZINE HYDROCHLORIDE 25 MG/1
12.5-25 TABLET ORAL EVERY 4 HOURS PRN
Status: DISCONTINUED | OUTPATIENT
Start: 2023-08-14 | End: 2023-08-17 | Stop reason: HOSPADM

## 2023-08-14 RX ORDER — PANTOPRAZOLE SODIUM 40 MG/10ML
40 INJECTION, POWDER, LYOPHILIZED, FOR SOLUTION INTRAVENOUS 2 TIMES DAILY
Status: DISCONTINUED | OUTPATIENT
Start: 2023-08-14 | End: 2023-08-17 | Stop reason: HOSPADM

## 2023-08-14 RX ORDER — SODIUM CHLORIDE, SODIUM LACTATE, POTASSIUM CHLORIDE, CALCIUM CHLORIDE 600; 310; 30; 20 MG/100ML; MG/100ML; MG/100ML; MG/100ML
INJECTION, SOLUTION INTRAVENOUS CONTINUOUS
Status: DISCONTINUED | OUTPATIENT
Start: 2023-08-14 | End: 2023-08-16

## 2023-08-14 RX ADMIN — POTASSIUM CHLORIDE 40 MEQ: 1500 TABLET, EXTENDED RELEASE ORAL at 18:48

## 2023-08-14 RX ADMIN — ONDANSETRON 4 MG: 2 INJECTION INTRAMUSCULAR; INTRAVENOUS at 17:27

## 2023-08-14 RX ADMIN — METRONIDAZOLE 500 MG: 5 INJECTION, SOLUTION INTRAVENOUS at 23:10

## 2023-08-14 RX ADMIN — CEFTRIAXONE SODIUM 1000 MG: 10 INJECTION, POWDER, FOR SOLUTION INTRAVENOUS at 22:22

## 2023-08-14 RX ADMIN — HYDROMORPHONE HYDROCHLORIDE 0.25 MG: 1 INJECTION, SOLUTION INTRAMUSCULAR; INTRAVENOUS; SUBCUTANEOUS at 22:20

## 2023-08-14 RX ADMIN — PANTOPRAZOLE SODIUM 40 MG: 40 INJECTION, POWDER, FOR SOLUTION INTRAVENOUS at 22:48

## 2023-08-14 RX ADMIN — PAROXETINE 60 MG: 30 TABLET, FILM COATED ORAL at 22:37

## 2023-08-14 RX ADMIN — QUETIAPINE FUMARATE 100 MG: 100 TABLET ORAL at 22:38

## 2023-08-14 RX ADMIN — MORPHINE SULFATE 4 MG: 4 INJECTION, SOLUTION INTRAMUSCULAR; INTRAVENOUS at 17:29

## 2023-08-14 RX ADMIN — POTASSIUM CHLORIDE 10 MEQ: 7.46 INJECTION, SOLUTION INTRAVENOUS at 23:08

## 2023-08-14 RX ADMIN — SODIUM CHLORIDE, POTASSIUM CHLORIDE, SODIUM LACTATE AND CALCIUM CHLORIDE 1000 ML: 600; 310; 30; 20 INJECTION, SOLUTION INTRAVENOUS at 23:06

## 2023-08-14 RX ADMIN — SODIUM CHLORIDE, POTASSIUM CHLORIDE, SODIUM LACTATE AND CALCIUM CHLORIDE 1128 ML: 600; 310; 30; 20 INJECTION, SOLUTION INTRAVENOUS at 17:22

## 2023-08-14 ASSESSMENT — ENCOUNTER SYMPTOMS
RESPIRATORY NEGATIVE: 1
EYES NEGATIVE: 1
CARDIOVASCULAR NEGATIVE: 1
PSYCHIATRIC NEGATIVE: 1
NAUSEA: 1
VOMITING: 1
DIZZINESS: 1
MUSCULOSKELETAL NEGATIVE: 1
WEAKNESS: 1
ABDOMINAL PAIN: 1

## 2023-08-14 ASSESSMENT — PAIN DESCRIPTION - PAIN TYPE: TYPE: ACUTE PAIN

## 2023-08-14 ASSESSMENT — FIBROSIS 4 INDEX: FIB4 SCORE: 0.24

## 2023-08-14 NOTE — ED PROVIDER NOTES
ER Provider Note    Scribed for Leia Bates M.d. by Robby Carr. 8/14/2023  4:54 PM    Primary Care Provider: Fidencio Christopher D.O.    CHIEF COMPLAINT  Chief Complaint   Patient presents with    Abdominal Pain     Pt BIB EMS from home with complaints of abd pain centrally located. Pt had G tube placed in July ans has had pain off and on since then. Tried to give herself a boost this morning and had immediate serious pain so called EMS.     N/V     PT hasn't been able to keep anything down for weeks, much dry heaving noted     EXTERNAL RECORDS REVIEWED  Other Admitted here in May 2023. Hx of recurrent UTI, recurrent starvation ketosis, HASMUKH,  Seen here on June 29th for syncope and on June 26th for abdominal pain.    HPI/ROS  LIMITATION TO HISTORY   Select: : None  OUTSIDE HISTORIAN(S):  None    Mary Martinez is a 53 y.o. female who presents to the ED complaining of abdominal pain with associated nausea, vomiting and dry heaves onset 2 weeks ago. She has associated vomiting and notes that she has been throwing up clear and yellow liquids but denies hematemesis. The patient states that she has been drinking water and then throws it up, and still feels really thirsty . She adds that she has a GI tube which was put in due to malnutrition on June 12th. She had an obstruction about a year ago. She also experiences chest pain and heartburn. Patient denies heart failure and diarrhea. She does not recall her last bowel movement. She states that the chest pain today started shortly after administering some boost through the feeding tube.  She reports a substernal chest pressure which was nonradiating as well as some shortness of breath and sweating which lasted 10-15 minutes before it went away this morning.  No chest pain or shortness of breath at this time.  No alleviating or exacerbating factors reported.  Patient reports that she has had similar abdominal pain with associated nausea vomiting in  "the past.  She says that nobody can figure out why she has this abdominal pain.  Patient says her blood pressure normally runs 83 systolic.  Blood pressure here in the ER is currently 85-90 systolic.  She states \"that is actually high for me.\"  Patient states she has had a cholecystectomy and an appendectomy.  She still has a uterus.    PAST MEDICAL HISTORY  Past Medical History:   Diagnosis Date    Acute renal failure (ARF) (HCC)     Allergy     Anemia     Anxiety     Arthritis     Rheumatoid -follows with rheumatologist. Has several fractures due to RA.    ASTHMA     inhalers prn    Blood transfusion without reported diagnosis     Bowel habit changes     4/24/20-constipation and diarrhea.    Bronchitis last approx 2018    Chronic pain 04/24/2020    Due to RA    Dental disorder     no teeth upper-does not wear her denture. Broken teeth on bottom.    Depression     GERD (gastroesophageal reflux disease)     GIB (gastrointestinal bleeding)     Head ache     Heart burn     taking famotidine    Hiatus hernia syndrome     History of pancreatitis     Hypokalemia     Hyponatremia     Idiopathic chronic pancreatitis (HCC)     Indigestion     taking famotidine    Intractable nausea and vomiting     Kidney disease     Pain     CPS-everywhere    Pneumonia 10/2019    PONV (postoperative nausea and vomiting)     Psychiatric problem     anxiety and depression    Rheumatoid aortitis     Rheumatoid arthritis(714.0) 2003    SMAS (superior mesenteric artery syndrome) (HCC)     Substance abuse ([facility])        SURGICAL HISTORY  Past Surgical History:   Procedure Laterality Date    VA PLACE PERCUT GASTROSTOMY TUBE N/A 6/12/2023    Procedure: INSERTION, PEG TUBE - PEG AND J TUBE PLACEMENT;  Surgeon: Marilyn Anderson M.D.;  Location: [facility]ERY [facility];  Service: Gastroenterology    VA UPPER GI ENDOSCOPY,DIAGNOSIS  6/12/2023    Procedure: GASTROSCOPY;  Surgeon: Marilyn Anderson M.D.;  Location: [facility]ERY [facility];  " Service: Gastroenterology    OR UPPER GI ENDOSCOPY,DIAGNOSIS N/A 9/9/2022    Procedure: ENDOSCOPIC ULTRASOUND- UPPER;  Surgeon: Jorge Brooke M.D.;  Location: [facility];  Service: EUS    EGD W/ENDOSCOPIC ULTRASOUND N/A 9/9/2022    Procedure: EGD, WITH ENDOSCOPIC US;  Surgeon: Jorge Brooke M.D.;  Location: [facility];  Service: EUS    OR ENDOSCOPIC US EXAM, ESOPH  4/29/2020    Procedure: EGD, WITH ENDOSCOPIC US;  Surgeon: Jorge Brooke M.D.;  Location: [facility];  Service: Gastroenterology    GASTROSCOPY-ENDO  4/29/2020    Procedure: GASTROSCOPY;  Surgeon: Jorge Brooke M.D.;  Location: [facility];  Service: Gastroenterology    EGD WITH ASP/BX  4/29/2020    Procedure: EGD, WITH ASPIRATION BIOPSY - POSS FNA;  Surgeon: Jorge Brooke M.D.;  Location: [facility];  Service: Gastroenterology    OR EXPLORATORY OF ABDOMEN N/A 10/4/2019    Procedure: LAPAROTOMY, EXPLORATORY AND REPAIR OF DUODENAL PERFORATION;  Surgeon: James Dumont M.D.;  Location: [facility];  Service: General    COLONOSCOPY  2018    with upper endoscopy    FINGER ARTHROPLASTY Right 6/5/2017    Procedure: FINGER ARTHROPLASTY - LONG, RING AND SMALL VOLAR PLATE;  Surgeon: Lobo Rosen M.D.;  Location: SURGERY SAME DAY [facility];  Service:     FINGER AMPUTATION  6/5/2017    Procedure: FINGER AMPUTATION - LONG, RING AND SMALL AT THE PROXIMAL INTERPHALANGEAL JOINT;  Surgeon: Lobo Rosen M.D.;  Location: SURGERY SAME DAY [facility];  Service:     FINGER ARTHROPLASTY Right 4/17/2017    Procedure: FINGER ARTHROPLASTY ;  Surgeon: Lobo Rosen M.D.;  Location: SURGERY SAME DAY [facility];  Service:     FINGER AMPUTATION Right 9/14/2016    Procedure: FINGER AMPUTATION INDEX;  Surgeon: Lobo Rosen M.D.;  Location: SURGERY SAME DAY [facility];  Service:     IRRIGATION & DEBRIDEMENT ORTHO Right 9/11/2016     Procedure: IRRIGATION & DEBRIDEMENT ORTHO - right index finger;  Surgeon: Madhu Rosen M.D.;  Location: SURGERY [facility];  Service:     FUSION, PIP JOINT, TOE Right 8/29/2016    Procedure: RE-DO INDEX FINGER PROXIMAL INTERPHALANGEAL ARTHRODESIS;  Surgeon: Lobo Rosen M.D.;  Location: SURGERY SAME DAY [facility];  Service:     BONE GRAFT Right 8/29/2016    Procedure: BONE GRAFT - DISTAL RADIUS ;  Surgeon: Lobo Rosen M.D.;  Location: SURGERY SAME DAY [facility];  Service:     ARTHRODESIS Right 5/6/2016    Procedure: ARTHRODESIS INDEX FINGER PROXIMAL INTERPHALANGEAL;  Surgeon: Lobo Rosen M.D.;  Location: SURGERY SAME DAY [facility];  Service:     FOOT RECONSTRUCTION RHEUMATIC Left 7/29/2015    Procedure: FOOT RECONSTRUCTION RHEUMATIC;  Surgeon: Heriberto Alves M.D.;  Location: SURGERY [facility];  Service:     TOE FUSION Right 5/27/2015    Procedure: TOE FUSION 1ST METATARSALPHALANGEAL JOINT;  Surgeon: Heriberto Alves M.D.;  Location: SURGERY [facility];  Service:     BONE SPUR EXCISION  5/27/2015    Procedure: BONE SPUR EXCISION METATARSAL HEAD 2-3;  Surgeon: Heriberto Alves M.D.;  Location: SURGERY [facility];  Service:     HAMMERTOE CORRECTION  5/27/2015    Procedure: HAMMERTOE CORRECTION AND SOFT TISSUE RE-ALINGMENT 2-3 ;  Surgeon: Heriberto Alves M.D.;  Location: SURGERY [facility];  Service:     DENTAL EXTRACTION(S)  2014    all of upper teeth    ABDOMINAL ABSCESS DRAINAGE  9/27/2011    Performed by VERO WRIGHT at [facility]    EMANUEL BY LAPAROSCOPY  9/27/2011    Performed by VERO WRIGHT at [facility]    APPENDECTOMY  2011    Found out it had ruptured prior to abcess surgery    REPEAT C SECTION  2008    REPEAT C SECTION  2005    REPEAT C SECTION  1999    PRIMARY C SECTION  1991    TONSILLECTOMY  1982    WRIST EXPLORATION Left 1980's    fractured wrist-no hardware       FAMILY HISTORY  Family History  "  Problem Relation Age of Onset    Cancer Mother     Heart Disease Mother     Hypertension Mother     Heart Disease Father     Hypertension Father        SOCIAL HISTORY   reports that she has been smoking cigarettes. She has a 15.00 pack-year smoking history. She has never used smokeless tobacco. She reports that she does not drink alcohol and does not use drugs.    CURRENT MEDICATIONS  Previous Medications    ESOMEPRAZOLE (NEXIUM) 20 MG CAPSULE    Take 20 mg by mouth every morning before breakfast.    MIDODRINE (PROAMATINE) 10 MG TABLET    Take 1 Tablet by mouth 3 times a day.    NALOXONE (NARCAN) 4 MG/0.1ML LIQUID    One spray in one nostril for overdose and call 911.    OXYCODONE-ACETAMINOPHEN (PERCOCET) 7.5-325 MG PER TABLET    Take 1 Tablet by mouth every 6 hours as needed for Severe Pain for up to 30 days.    OXYCODONE-ACETAMINOPHEN (PERCOCET) 7.5-325 MG PER TABLET    Take 1 Tablet by mouth every 6 hours as needed for Severe Pain for up to 30 days.    PAROXETINE (PAXIL) 30 MG TAB    Take 60 mg by mouth every evening. 60 mg = 2 tablets    QUETIAPINE (SEROQUEL) 100 MG TAB    Take 1 Tablet by mouth every evening.       ALLERGIES  Penicillins, Aripiprazole, and Nitrous oxide    PHYSICAL EXAM  BP (!) 86/61   Pulse (!) 104   Temp 36.3 °C (97.3 °F) (Oral)   Resp 13   Ht 1.6 m (5' 3\")   Wt 37.6 kg (83 lb)   LMP 09/21/2011   SpO2 100%   BMI 14.70 kg/m²     Constitutional: Thin and cachectic and emaciated; mild distress due to pain in and nausea.  HENT: Normocephalic, Atraumatic, Bilateral external ears normal, Dry mucus membranes,   Eyes: PERRL, EOMI, Conjunctiva normal, No discharge.   Neck: Normal range of motion, supple, nontender  Lymphatic: No lymphadenopathy noted.   Cardiovascular: Normal heart rate, Normal rhythm, No murmurs, rubs or gallops   Thorax & Lungs: CTA=bilaterally;  No respiratory distress,  No wheezing rales, or rhonchi; No chest tenderness. No crepitus or subQ air  Abdomen: Mild diffuse " tenderness to abdomen, no guarding no rebound, no masses, no pulsatile mass, no distention, feeding tube in place in upper abdomen.  Skin: Warm, Dry, No erythema, No rash.   Back: No tenderness, No CVA tenderness.   Extremities: 2+ dp and pt pulses bilateral LEs;  Nontender; no pretibial edema  Neurologic: Alert & oriented x 4, clear speech  Psychiatric: appropriate, normal affect    DIAGNOSTIC STUDIES    Labs:   Results for orders placed or performed during the hospital encounter of 08/14/23   Lactic acid (lactate)   Result Value Ref Range    Lactic Acid 2.6 (H) 0.5 - 2.0 mmol/L   Lactic acid (lactate): Repeat if initial lactic acid result is greater than 2   Result Value Ref Range    Lactic Acid 1.5 0.5 - 2.0 mmol/L   CBC With Differential   Result Value Ref Range    WBC 10.8 4.8 - 10.8 K/uL    RBC 3.26 (L) 4.20 - 5.40 M/uL    Hemoglobin 10.4 (L) 12.0 - 16.0 g/dL    Hematocrit 31.6 (L) 37.0 - 47.0 %    MCV 96.9 81.4 - 97.8 fL    MCH 31.9 27.0 - 33.0 pg    MCHC 32.9 32.2 - 35.5 g/dL    RDW 50.7 (H) 35.9 - 50.0 fL    Platelet Count 435 164 - 446 K/uL    MPV 9.9 9.0 - 12.9 fL    Neutrophils-Polys 79.90 (H) 44.00 - 72.00 %    Lymphocytes 15.30 (L) 22.00 - 41.00 %    Monocytes 2.90 0.00 - 13.40 %    Eosinophils 0.70 0.00 - 6.90 %    Basophils 0.70 0.00 - 1.80 %    Immature Granulocytes 0.50 0.00 - 0.90 %    Nucleated RBC 0.00 0.00 - 0.20 /100 WBC    Neutrophils (Absolute) 8.66 (H) 1.82 - 7.42 K/uL    Lymphs (Absolute) 1.66 1.00 - 4.80 K/uL    Monos (Absolute) 0.31 0.00 - 0.85 K/uL    Eos (Absolute) 0.08 0.00 - 0.51 K/uL    Baso (Absolute) 0.08 0.00 - 0.12 K/uL    Immature Granulocytes (abs) 0.05 0.00 - 0.11 K/uL    NRBC (Absolute) 0.00 K/uL   Comp Metabolic Panel   Result Value Ref Range    Sodium 134 (L) 135 - 145 mmol/L    Potassium 2.3 (LL) 3.6 - 5.5 mmol/L    Chloride 106 96 - 112 mmol/L    Co2 10 (L) 20 - 33 mmol/L    Anion Gap 18.0 (H) 7.0 - 16.0    Glucose 212 (H) 65 - 99 mg/dL    Bun 30 (H) 8 - 22 mg/dL     Creatinine 0.97 0.50 - 1.40 mg/dL    Calcium 8.9 8.5 - 10.5 mg/dL    Correct Calcium 8.8 8.5 - 10.5 mg/dL    AST(SGOT) 12 12 - 45 U/L    ALT(SGPT) <5 2 - 50 U/L    Alkaline Phosphatase 120 (H) 30 - 99 U/L    Total Bilirubin 0.2 0.1 - 1.5 mg/dL    Albumin 4.1 3.2 - 4.9 g/dL    Total Protein 7.4 6.0 - 8.2 g/dL    Globulin 3.3 1.9 - 3.5 g/dL    A-G Ratio 1.2 g/dL   Urinalysis    Specimen: Urine   Result Value Ref Range    Color Yellow     Character Clear     Specific Gravity 1.025 <1.035    Ph 6.5 5.0 - 8.0    Glucose Negative Negative mg/dL    Ketones Negative Negative mg/dL    Protein 30 (A) Negative mg/dL    Bilirubin Negative Negative    Urobilinogen, Urine 0.2 Negative    Nitrite Negative Negative    Leukocyte Esterase Trace (A) Negative    Occult Blood Moderate (A) Negative    Micro Urine Req Microscopic    ESTIMATED GFR   Result Value Ref Range    GFR (CKD-EPI) 71 >60 mL/min/1.73 m 2   MAGNESIUM   Result Value Ref Range    Magnesium 2.2 1.5 - 2.5 mg/dL   PHOSPHORUS   Result Value Ref Range    Phosphorus 1.9 (L) 2.5 - 4.5 mg/dL   URINE MICROSCOPIC (W/UA)   Result Value Ref Range    WBC 0-2 /hpf    RBC 5-10 (A) /hpf    Bacteria Few (A) None /hpf    Epithelial Cells Few /hpf    Hyaline Cast 0-2 /lpf   BETA-HYDROXYBUTYRIC ACID   Result Value Ref Range    beta-Hydroxybutyric Acid 0.21 0.02 - 0.27 mmol/L   RETICULOCYTES COUNT   Result Value Ref Range    Reticulocyte Count 2.0 0.8 - 2.6 %    Retic, Absolute 0.06 0.04 - 0.12 M/uL    Imm. Reticulocyte Fraction 9.3 2.6 - 16.1 %    Retic Hgb Equivalent 33.3 29.0 - 35.0 pg/cell   IRON/TOTAL IRON BIND   Result Value Ref Range    Iron 25 (L) 40 - 170 ug/dL    Total Iron Binding 276 250 - 450 ug/dL    Unsat Iron Binding 251 110 - 370 ug/dL    % Saturation 9 (L) 15 - 55 %   FERRITIN   Result Value Ref Range    Ferritin 26.1 10.0 - 291.0 ng/mL   TROPONIN   Result Value Ref Range    Troponin T 30 (H) 6 - 19 ng/L   EKG   Result Value Ref Range    Report       [facility]  [facility] Emergency Dept.    Test Date:  2023  Pt Name:    STEFFANIE ELLISON                Department: ER  MRN:        [id number]                      Room:       RD 12  Gender:     Female                       Technician:  :        [id number]                   Requested By:ER TRIAGE PROTOCOL  Order #:    [id number]                    Reading MD: Leia Bates    Measurements  Intervals                                Axis  Rate:       111                          P:          89  KY:         164                          QRS:        104  QRSD:       115                          T:          -54  QT:         367  QTc:        499    Interpretive Statements  Sinus tachycardia rate 111  Normal axis  No ST elevation  Mild ST depression V3-V5 (unchanged from )  Biatrial enlargement  Nonspecific intraventricular conduction delay  Inferoposterior infarct, recent  Compared to ECG 2023 09:49:24  Intraventricular conduction delay now present  Sinus rhythm no longer pres ent    Electronically Signed On 2023 18:59:01 PDT by Leia Bates       EK Lead EKG interpreted by me as shown above     Radiology:   The attending emergency physician has independently interpreted the diagnostic imaging associated with this visit and am waiting the final reading from the radiologist.     Preliminary interpretation is a follows: Chest x-ray was reviewed by ER MD.  No obvious infiltrates.    Radiologist interpretation:     CT-ABDOMEN-PELVIS W/O   Final Result      1.  Punctate hyperdensity in the distal CBD, which is dilated up to 1 cm. This may represent choledocholithiasis. Consider MRCP.   2.  Air-fluid levels in the colon, suggestive of diarrhea.   3.  Bilateral medullary nephrocalcinosis and bilateral nonobstructing renal stones.      DX-CHEST-PORTABLE (1 VIEW)   Final Result      No acute cardiopulmonary abnormality.         [id number]    (Results Pending)      COURSE & MEDICAL DECISION MAKING     ED Observation  Status? Yes; I am placing the patient in to an observation status due to a diagnostic uncertainty as well as therapeutic intensity. Patient placed in observation status at 5:09 PM, 8/14/2023.     Observation plan is as follows: We will manage the patient's symptoms, evaluate with lab work and imaging, and reassess after results are reviewed.     Upon Reevaluation, the patient's condition has: not improved; and will be escalated to hospitalization.    Patient discharged from ED Observation status at 7:02 PM  (Time) 8/14/2023  (Date).     INITIAL ASSESSMENT, COURSE AND PLAN  Care Narrative: Patient presents to the ER complaining of abdominal pain with associated nausea, vomiting and dry heaves for the last 2 weeks.  She says she feels very dehydrated.  Patient has a feeding tube for starvation and malnutrition.  It was placed in June 2023.  Patient says she tried to give herself some boost through the feeding tube today but it caused worsening abdominal pain with nausea and vomiting and actually caused some chest pain.  She had about 15 minutes of a substernal chest pain with associated shortness of breath and sweating.  She is not having any chest pain at this time.  EKG does not reveal any acute ST elevation or depression.  Troponin is minimally elevated at 30.  At this time I do not think this is STEMI or non-STEMI.  With respect to patient's nausea, vomiting abdominal pain, CT scan of the abdomen and pelvis was performed without contrast as we could not get a very large IV in this patient.  Her veins just kept blowing.  We were able to get a small IV, net for fluids and medications.  I did not think she needed a contrasted study as her appendix and gallbladder of already been removed.  I was more concerned about obstruction and perforation.  CT scan does not reveal any obvious obstruction.  She has some air-fluid levels in the colon consistent with diarrheal pattern, but no small bowel obstruction seen.  She was  "found to have a little small calculus in the biliary system.  Radiologist recommended an MRI to further evaluate.  Patient's gallbladder is already been removed.  Patient's electrolytes are quite abnormal with a low phosphorus as well as a low potassium of 2.3.  She was given some IV potassium as well as p.o. potassium.  Her magnesium level is normal.  No need for magnesium supplementation.  At this time I think the patient's electrolyte disturbance are related to her malnutrition as well as her nausea and vomiting for 2 weeks.  Patient will need to be hospitalized.  Additionally, her bicarb is low at 10.  Patient has had fairly low bicarb's over the last several months, but never this low.  Her bicarbs have been typically in the 15-19 range.  10 is quite low for her.  Lactic acid level is normal.  Her sugar is a little bit high at 212.  She does not have a history of diabetes, perhaps this is new onset.  She is not on any diabetic medications and I do not think this is euglycemia DKA.  I think the patient needs to be hospitalized for IV fluids and correction of her electrolyte disturbance and her acidosis.  She has a low blood pressure here in the ER but the patient says her blood pressure typically runs in the 83 systolic range.  85-90 is actually \"high for her.\"  The patient is awake and alert.  She is feeling better with the antiemetics and the pain medicine.  I spoke with Dr. Hurd, hospitalist on-call, he will kindly evaluate the patient for hospitalization.    5:09 PM - Patient seen and examined at bedside. They present to the ED for abdominal pain onset 2 weeks ago.  Discussed plan of care, including labs and radiology. Patient agrees to the plan of care. The patient will be medicated with lactated ringers infusion, Kdur 40 mEQ, Morphine 4 mg Injection, and Zofran 4 mg Injection.     8:13 PM - The patient had the opportunity to ask any questions. The plan for hospitalization was discussed with the patient " given their current presentation and diagnostic study results. Patient is understanding and agreeable to the plan for hospitalization.      HYDRATION: Based on the patient's presentation of Acute Vomiting and Inability to take oral fluids the patient was given IV fluids. IV Hydration was used because oral hydration was not adequate alone. Upon recheck following hydration, the patient was improved.    ADDITIONAL PROBLEM LIST  Problem 1: Abdominal pain x2 weeks  Problem #2: Nausea and vomiting x2 weeks  Problem #3: Chest pain today, now resolved    DISPOSITION AND DISCUSSIONS  I have discussed management of the patient with the following physicians and HALIMA's:    (Hospitalist)    Discussion of management with other hospitals or appropriate source(s): None     Escalation of care considered, and ultimately not performed: IV fluids.  Patient does not appear to be septic or toxic.  She is not hypotensive for her.  She is not febrile.  She is not tachycardic.  White count is normal.  At this time I do not think that she needs fluids per sepsis protocol.    Barriers to care at this time, including but not limited to:  None .     Decision tools and prescription drugs considered including, but not limited to: Pain Medications patient was given several rounds of morphine to help with the pain. .    CRITICAL CARE  The very real possibilty of a deterioration of this patient's condition required the highest level of my preparedness for sudden, emergent intervention.  I provided critical care services, which included medication orders, frequent reevaluations of the patient's condition and response to treatment, ordering and reviewing test results, and discussing the case with various consultants.  The critical care time associated with the care of the patient was 35 minutes. Review chart for interventions. This time is exclusive of any other billable procedures.      FINAL DIANGOSIS  1. Hypokalemia Acute   2. Dehydration Acute    3. Nausea and vomiting, unspecified vomiting type Acute   4. Abdominal pain, unspecified abdominal location Acute   5. Hypophosphatemia     The critical care time associated with the care of the patient was 35 minutes.    This dictation has been created using voice recognition software. The accuracy of the dictation is limited by the abilities of the software. I expect there may be some errors of grammar and possibly content. I made every attempt to manually correct the errors within my dictation. However, errors related to voice recognition software may still exist and should be interpreted within the appropriate context.    DISPOSITION:  Patient will be hospitalized by Dr. Hurd in guarded condition.      The note accurately reflects work and decisions made by me.  Leia Bates M.D.  8/15/2023  12:50 AM

## 2023-08-15 ENCOUNTER — APPOINTMENT (OUTPATIENT)
Dept: RADIOLOGY | Facility: MEDICAL CENTER | Age: 53
DRG: 640 | End: 2023-08-15
Attending: STUDENT IN AN ORGANIZED HEALTH CARE EDUCATION/TRAINING PROGRAM
Payer: MEDICAID

## 2023-08-15 LAB
ALBUMIN SERPL BCP-MCNC: 2.9 G/DL (ref 3.2–4.9)
ALBUMIN/GLOB SERPL: 1.1 G/DL
ALP SERPL-CCNC: 84 U/L (ref 30–99)
ALT SERPL-CCNC: 14 U/L (ref 2–50)
ANION GAP SERPL CALC-SCNC: 13 MMOL/L (ref 7–16)
ANION GAP SERPL CALC-SCNC: 15 MMOL/L (ref 7–16)
AST SERPL-CCNC: 26 U/L (ref 12–45)
BACTERIA UR CULT: NORMAL
BASOPHILS # BLD AUTO: 0.5 % (ref 0–1.8)
BASOPHILS # BLD: 0.05 K/UL (ref 0–0.12)
BILIRUB SERPL-MCNC: <0.2 MG/DL (ref 0.1–1.5)
BUN SERPL-MCNC: 19 MG/DL (ref 8–22)
BUN SERPL-MCNC: 24 MG/DL (ref 8–22)
CALCIUM ALBUM COR SERPL-MCNC: 8.8 MG/DL (ref 8.5–10.5)
CALCIUM SERPL-MCNC: 7.7 MG/DL (ref 8.5–10.5)
CALCIUM SERPL-MCNC: 7.9 MG/DL (ref 8.5–10.5)
CHLORIDE SERPL-SCNC: 108 MMOL/L (ref 96–112)
CHLORIDE SERPL-SCNC: 109 MMOL/L (ref 96–112)
CO2 SERPL-SCNC: 11 MMOL/L (ref 20–33)
CO2 SERPL-SCNC: 11 MMOL/L (ref 20–33)
CREAT SERPL-MCNC: 0.6 MG/DL (ref 0.5–1.4)
CREAT SERPL-MCNC: 0.61 MG/DL (ref 0.5–1.4)
EOSINOPHIL # BLD AUTO: 0.12 K/UL (ref 0–0.51)
EOSINOPHIL NFR BLD: 1.1 % (ref 0–6.9)
ERYTHROCYTE [DISTWIDTH] IN BLOOD BY AUTOMATED COUNT: 50.6 FL (ref 35.9–50)
EST. AVERAGE GLUCOSE BLD GHB EST-MCNC: 94 MG/DL
GFR SERPLBLD CREATININE-BSD FMLA CKD-EPI: 108 ML/MIN/1.73 M 2
GFR SERPLBLD CREATININE-BSD FMLA CKD-EPI: 108 ML/MIN/1.73 M 2
GLOBULIN SER CALC-MCNC: 2.6 G/DL (ref 1.9–3.5)
GLUCOSE SERPL-MCNC: 96 MG/DL (ref 65–99)
GLUCOSE SERPL-MCNC: 97 MG/DL (ref 65–99)
HBA1C MFR BLD: 4.9 % (ref 4–5.6)
HCT VFR BLD AUTO: 28.8 % (ref 37–47)
HGB BLD-MCNC: 8.3 G/DL (ref 12–16)
HGB BLD-MCNC: 9.5 G/DL (ref 12–16)
HGB BLD-MCNC: 9.5 G/DL (ref 12–16)
IMM GRANULOCYTES # BLD AUTO: 0.04 K/UL (ref 0–0.11)
IMM GRANULOCYTES NFR BLD AUTO: 0.4 % (ref 0–0.9)
LYMPHOCYTES # BLD AUTO: 1.81 K/UL (ref 1–4.8)
LYMPHOCYTES NFR BLD: 17.1 % (ref 22–41)
MAGNESIUM SERPL-MCNC: 1.8 MG/DL (ref 1.5–2.5)
MAGNESIUM SERPL-MCNC: 1.9 MG/DL (ref 1.5–2.5)
MCH RBC QN AUTO: 32 PG (ref 27–33)
MCHC RBC AUTO-ENTMCNC: 33 G/DL (ref 32.2–35.5)
MCV RBC AUTO: 97 FL (ref 81.4–97.8)
MONOCYTES # BLD AUTO: 0.38 K/UL (ref 0–0.85)
MONOCYTES NFR BLD AUTO: 3.6 % (ref 0–13.4)
NEUTROPHILS # BLD AUTO: 8.19 K/UL (ref 1.82–7.42)
NEUTROPHILS NFR BLD: 77.3 % (ref 44–72)
NRBC # BLD AUTO: 0 K/UL
NRBC BLD-RTO: 0 /100 WBC (ref 0–0.2)
PHOSPHATE SERPL-MCNC: 2.1 MG/DL (ref 2.5–4.5)
PLATELET # BLD AUTO: 333 K/UL (ref 164–446)
PMV BLD AUTO: 9.7 FL (ref 9–12.9)
POTASSIUM SERPL-SCNC: 2.8 MMOL/L (ref 3.6–5.5)
POTASSIUM SERPL-SCNC: 3.3 MMOL/L (ref 3.6–5.5)
PROT SERPL-MCNC: 5.5 G/DL (ref 6–8.2)
RBC # BLD AUTO: 2.97 M/UL (ref 4.2–5.4)
SIGNIFICANT IND 70042: NORMAL
SITE SITE: NORMAL
SODIUM SERPL-SCNC: 133 MMOL/L (ref 135–145)
SODIUM SERPL-SCNC: 134 MMOL/L (ref 135–145)
SOURCE SOURCE: NORMAL
TROPONIN T SERPL-MCNC: 22 NG/L (ref 6–19)
WBC # BLD AUTO: 10.6 K/UL (ref 4.8–10.8)

## 2023-08-15 PROCEDURE — 87493 C DIFF AMPLIFIED PROBE: CPT

## 2023-08-15 PROCEDURE — A9270 NON-COVERED ITEM OR SERVICE: HCPCS | Performed by: STUDENT IN AN ORGANIZED HEALTH CARE EDUCATION/TRAINING PROGRAM

## 2023-08-15 PROCEDURE — 700105 HCHG RX REV CODE 258: Performed by: STUDENT IN AN ORGANIZED HEALTH CARE EDUCATION/TRAINING PROGRAM

## 2023-08-15 PROCEDURE — 700101 HCHG RX REV CODE 250: Performed by: STUDENT IN AN ORGANIZED HEALTH CARE EDUCATION/TRAINING PROGRAM

## 2023-08-15 PROCEDURE — 87045 FECES CULTURE AEROBIC BACT: CPT

## 2023-08-15 PROCEDURE — 700111 HCHG RX REV CODE 636 W/ 250 OVERRIDE (IP): Performed by: STUDENT IN AN ORGANIZED HEALTH CARE EDUCATION/TRAINING PROGRAM

## 2023-08-15 PROCEDURE — 87205 SMEAR GRAM STAIN: CPT

## 2023-08-15 PROCEDURE — 87046 STOOL CULTR AEROBIC BACT EA: CPT

## 2023-08-15 PROCEDURE — 85018 HEMOGLOBIN: CPT

## 2023-08-15 PROCEDURE — 96365 THER/PROPH/DIAG IV INF INIT: CPT

## 2023-08-15 PROCEDURE — 99497 ADVNCD CARE PLAN 30 MIN: CPT | Performed by: STUDENT IN AN ORGANIZED HEALTH CARE EDUCATION/TRAINING PROGRAM

## 2023-08-15 PROCEDURE — 96376 TX/PRO/DX INJ SAME DRUG ADON: CPT

## 2023-08-15 PROCEDURE — 36415 COLL VENOUS BLD VENIPUNCTURE: CPT

## 2023-08-15 PROCEDURE — 80053 COMPREHEN METABOLIC PANEL: CPT

## 2023-08-15 PROCEDURE — 87077 CULTURE AEROBIC IDENTIFY: CPT

## 2023-08-15 PROCEDURE — 80048 BASIC METABOLIC PNL TOTAL CA: CPT

## 2023-08-15 PROCEDURE — 770020 HCHG ROOM/CARE - TELE (206)

## 2023-08-15 PROCEDURE — 83036 HEMOGLOBIN GLYCOSYLATED A1C: CPT

## 2023-08-15 PROCEDURE — 700111 HCHG RX REV CODE 636 W/ 250 OVERRIDE (IP)

## 2023-08-15 PROCEDURE — 74181 MRI ABDOMEN W/O CONTRAST: CPT

## 2023-08-15 PROCEDURE — 84100 ASSAY OF PHOSPHORUS: CPT

## 2023-08-15 PROCEDURE — 82272 OCCULT BLD FECES 1-3 TESTS: CPT

## 2023-08-15 PROCEDURE — C9113 INJ PANTOPRAZOLE SODIUM, VIA: HCPCS | Performed by: STUDENT IN AN ORGANIZED HEALTH CARE EDUCATION/TRAINING PROGRAM

## 2023-08-15 PROCEDURE — 85025 COMPLETE CBC W/AUTO DIFF WBC: CPT

## 2023-08-15 PROCEDURE — 83735 ASSAY OF MAGNESIUM: CPT

## 2023-08-15 PROCEDURE — 99233 SBSQ HOSP IP/OBS HIGH 50: CPT | Performed by: STUDENT IN AN ORGANIZED HEALTH CARE EDUCATION/TRAINING PROGRAM

## 2023-08-15 PROCEDURE — 87186 SC STD MICRODIL/AGAR DIL: CPT

## 2023-08-15 PROCEDURE — 84484 ASSAY OF TROPONIN QUANT: CPT

## 2023-08-15 PROCEDURE — 96366 THER/PROPH/DIAG IV INF ADDON: CPT

## 2023-08-15 PROCEDURE — 700105 HCHG RX REV CODE 258: Mod: JZ | Performed by: STUDENT IN AN ORGANIZED HEALTH CARE EDUCATION/TRAINING PROGRAM

## 2023-08-15 PROCEDURE — 700102 HCHG RX REV CODE 250 W/ 637 OVERRIDE(OP): Performed by: STUDENT IN AN ORGANIZED HEALTH CARE EDUCATION/TRAINING PROGRAM

## 2023-08-15 PROCEDURE — 96375 TX/PRO/DX INJ NEW DRUG ADDON: CPT

## 2023-08-15 RX ORDER — MORPHINE SULFATE 4 MG/ML
1 INJECTION INTRAVENOUS EVERY 4 HOURS PRN
Status: DISCONTINUED | OUTPATIENT
Start: 2023-08-15 | End: 2023-08-15

## 2023-08-15 RX ORDER — HYDROMORPHONE HYDROCHLORIDE 1 MG/ML
0.25 INJECTION, SOLUTION INTRAMUSCULAR; INTRAVENOUS; SUBCUTANEOUS EVERY 6 HOURS PRN
Status: DISCONTINUED | OUTPATIENT
Start: 2023-08-15 | End: 2023-08-16

## 2023-08-15 RX ORDER — LORAZEPAM 2 MG/ML
0.5 INJECTION INTRAMUSCULAR ONCE
Status: COMPLETED | OUTPATIENT
Start: 2023-08-15 | End: 2023-08-15

## 2023-08-15 RX ADMIN — PANTOPRAZOLE SODIUM 40 MG: 40 INJECTION, POWDER, FOR SOLUTION INTRAVENOUS at 17:20

## 2023-08-15 RX ADMIN — HYDROMORPHONE HYDROCHLORIDE 0.25 MG: 1 INJECTION, SOLUTION INTRAMUSCULAR; INTRAVENOUS; SUBCUTANEOUS at 01:24

## 2023-08-15 RX ADMIN — LORAZEPAM 0.5 MG: 2 INJECTION INTRAMUSCULAR; INTRAVENOUS at 22:18

## 2023-08-15 RX ADMIN — HYDROMORPHONE HYDROCHLORIDE 0.25 MG: 1 INJECTION, SOLUTION INTRAMUSCULAR; INTRAVENOUS; SUBCUTANEOUS at 07:00

## 2023-08-15 RX ADMIN — Medication 100 MG: at 07:03

## 2023-08-15 RX ADMIN — HYDROMORPHONE HYDROCHLORIDE 0.25 MG: 1 INJECTION, SOLUTION INTRAMUSCULAR; INTRAVENOUS; SUBCUTANEOUS at 18:09

## 2023-08-15 RX ADMIN — PANTOPRAZOLE SODIUM 40 MG: 40 INJECTION, POWDER, FOR SOLUTION INTRAVENOUS at 07:03

## 2023-08-15 RX ADMIN — ONDANSETRON 4 MG: 2 INJECTION INTRAMUSCULAR; INTRAVENOUS at 21:29

## 2023-08-15 RX ADMIN — ONDANSETRON 4 MG: 4 TABLET, ORALLY DISINTEGRATING ORAL at 15:11

## 2023-08-15 RX ADMIN — POTASSIUM PHOSPHATE, MONOBASIC AND POTASSIUM PHOSPHATE, DIBASIC 30 MMOL: 224; 236 INJECTION, SOLUTION, CONCENTRATE INTRAVENOUS at 11:06

## 2023-08-15 RX ADMIN — OXYCODONE HYDROCHLORIDE 5 MG: 5 TABLET ORAL at 21:31

## 2023-08-15 RX ADMIN — QUETIAPINE FUMARATE 100 MG: 100 TABLET ORAL at 17:21

## 2023-08-15 RX ADMIN — PAROXETINE 60 MG: 30 TABLET, FILM COATED ORAL at 17:20

## 2023-08-15 RX ADMIN — ONDANSETRON 4 MG: 2 INJECTION INTRAMUSCULAR; INTRAVENOUS at 01:30

## 2023-08-15 RX ADMIN — CEFTRIAXONE SODIUM 1000 MG: 10 INJECTION, POWDER, FOR SOLUTION INTRAVENOUS at 21:30

## 2023-08-15 RX ADMIN — METRONIDAZOLE 500 MG: 5 INJECTION, SOLUTION INTRAVENOUS at 21:36

## 2023-08-15 RX ADMIN — METRONIDAZOLE 500 MG: 5 INJECTION, SOLUTION INTRAVENOUS at 09:23

## 2023-08-15 RX ADMIN — POTASSIUM PHOSPHATE, MONOBASIC AND POTASSIUM PHOSPHATE, DIBASIC 30 MMOL: 224; 236 INJECTION, SOLUTION, CONCENTRATE INTRAVENOUS at 01:14

## 2023-08-15 RX ADMIN — POTASSIUM CHLORIDE 10 MEQ: 7.46 INJECTION, SOLUTION INTRAVENOUS at 04:08

## 2023-08-15 RX ADMIN — OXYCODONE HYDROCHLORIDE 5 MG: 5 TABLET ORAL at 15:10

## 2023-08-15 RX ADMIN — SODIUM CHLORIDE, POTASSIUM CHLORIDE, SODIUM LACTATE AND CALCIUM CHLORIDE: 600; 310; 30; 20 INJECTION, SOLUTION INTRAVENOUS at 01:21

## 2023-08-15 RX ADMIN — HYDROMORPHONE HYDROCHLORIDE 0.25 MG: 1 INJECTION, SOLUTION INTRAMUSCULAR; INTRAVENOUS; SUBCUTANEOUS at 10:26

## 2023-08-15 ASSESSMENT — PATIENT HEALTH QUESTIONNAIRE - PHQ9
2. FEELING DOWN, DEPRESSED, IRRITABLE, OR HOPELESS: NEARLY EVERY DAY
8. MOVING OR SPEAKING SO SLOWLY THAT OTHER PEOPLE COULD HAVE NOTICED. OR THE OPPOSITE, BEING SO FIGETY OR RESTLESS THAT YOU HAVE BEEN MOVING AROUND A LOT MORE THAN USUAL: NOT AT ALL
9. THOUGHTS THAT YOU WOULD BE BETTER OFF DEAD, OR OF HURTING YOURSELF: NOT AT ALL
SUM OF ALL RESPONSES TO PHQ QUESTIONS 1-9: 14
4. FEELING TIRED OR HAVING LITTLE ENERGY: NEARLY EVERY DAY
3. TROUBLE FALLING OR STAYING ASLEEP OR SLEEPING TOO MUCH: SEVERAL DAYS
SUM OF ALL RESPONSES TO PHQ9 QUESTIONS 1 AND 2: 4
1. LITTLE INTEREST OR PLEASURE IN DOING THINGS: SEVERAL DAYS
7. TROUBLE CONCENTRATING ON THINGS, SUCH AS READING THE NEWSPAPER OR WATCHING TELEVISION: MORE THAN HALF THE DAYS
SUM OF ALL RESPONSES TO PHQ9 QUESTIONS 1 AND 2: 4
4. FEELING TIRED OR HAVING LITTLE ENERGY: NEARLY EVERY DAY
3. TROUBLE FALLING OR STAYING ASLEEP OR SLEEPING TOO MUCH: SEVERAL DAYS
2. FEELING DOWN, DEPRESSED, IRRITABLE, OR HOPELESS: NEARLY EVERY DAY
1. LITTLE INTEREST OR PLEASURE IN DOING THINGS: SEVERAL DAYS
5. POOR APPETITE OR OVEREATING: NEARLY EVERY DAY
8. MOVING OR SPEAKING SO SLOWLY THAT OTHER PEOPLE COULD HAVE NOTICED. OR THE OPPOSITE, BEING SO FIGETY OR RESTLESS THAT YOU HAVE BEEN MOVING AROUND A LOT MORE THAN USUAL: NOT AT ALL
6. FEELING BAD ABOUT YOURSELF - OR THAT YOU ARE A FAILURE OR HAVE LET YOURSELF OR YOUR FAMILY DOWN: SEVERAL DAYS
5. POOR APPETITE OR OVEREATING: NEARLY EVERY DAY
9. THOUGHTS THAT YOU WOULD BE BETTER OFF DEAD, OR OF HURTING YOURSELF: NOT AT ALL
6. FEELING BAD ABOUT YOURSELF - OR THAT YOU ARE A FAILURE OR HAVE LET YOURSELF OR YOUR FAMILY DOWN: SEVERAL DAYS
SUM OF ALL RESPONSES TO PHQ QUESTIONS 1-9: 14
7. TROUBLE CONCENTRATING ON THINGS, SUCH AS READING THE NEWSPAPER OR WATCHING TELEVISION: MORE THAN HALF THE DAYS

## 2023-08-15 ASSESSMENT — LIFESTYLE VARIABLES
AVERAGE NUMBER OF DAYS PER WEEK YOU HAVE A DRINK CONTAINING ALCOHOL: 0
TOTAL SCORE: 0
HOW MANY TIMES IN THE PAST YEAR HAVE YOU HAD 5 OR MORE DRINKS IN A DAY: 0
CONSUMPTION TOTAL: NEGATIVE
HAVE YOU EVER FELT YOU SHOULD CUT DOWN ON YOUR DRINKING: NO
TOTAL SCORE: 0
EVER FELT BAD OR GUILTY ABOUT YOUR DRINKING: NO
ALCOHOL_USE: NO
DOES PATIENT WANT TO STOP DRINKING: NO
EVER HAD A DRINK FIRST THING IN THE MORNING TO STEADY YOUR NERVES TO GET RID OF A HANGOVER: NO
ON A TYPICAL DAY WHEN YOU DRINK ALCOHOL HOW MANY DRINKS DO YOU HAVE: 0
HAVE PEOPLE ANNOYED YOU BY CRITICIZING YOUR DRINKING: NO
TOTAL SCORE: 0

## 2023-08-15 ASSESSMENT — PAIN DESCRIPTION - PAIN TYPE
TYPE: ACUTE PAIN
TYPE: CHRONIC PAIN
TYPE: ACUTE PAIN

## 2023-08-15 ASSESSMENT — COGNITIVE AND FUNCTIONAL STATUS - GENERAL
DRESSING REGULAR UPPER BODY CLOTHING: A LOT
MOVING TO AND FROM BED TO CHAIR: A LITTLE
EATING MEALS: A LITTLE
MOBILITY SCORE: 15
DRESSING REGULAR LOWER BODY CLOTHING: A LOT
WALKING IN HOSPITAL ROOM: A LOT
TOILETING: A LITTLE
MOVING FROM LYING ON BACK TO SITTING ON SIDE OF FLAT BED: A LITTLE
TURNING FROM BACK TO SIDE WHILE IN FLAT BAD: A LITTLE
STANDING UP FROM CHAIR USING ARMS: A LITTLE
SUGGESTED CMS G CODE MODIFIER DAILY ACTIVITY: CK
SUGGESTED CMS G CODE MODIFIER MOBILITY: CK
CLIMB 3 TO 5 STEPS WITH RAILING: TOTAL
DAILY ACTIVITIY SCORE: 15
PERSONAL GROOMING: A LITTLE
HELP NEEDED FOR BATHING: A LOT

## 2023-08-15 ASSESSMENT — ENCOUNTER SYMPTOMS
DIARRHEA: 1
VOMITING: 1
DIZZINESS: 1
NAUSEA: 1
WEAKNESS: 1

## 2023-08-15 ASSESSMENT — FIBROSIS 4 INDEX
FIB4 SCORE: 1.06
FIB4 SCORE: 1.06

## 2023-08-15 NOTE — ED NOTES
Med Rec complete per patient  No oral antibiotics in the last 30 days  Allergies reviewed  Preferred [facility]acy: [facility]

## 2023-08-15 NOTE — ED NOTES
Pt up to [facility]. Urine sample collected. Pt had diarrhea. Back to bed.

## 2023-08-15 NOTE — PROGRESS NOTES
4 Eyes Skin Assessment Completed by LEANNE Robbins and LEANNE patel.    Head WDL  Ears WDL  Nose WDL  Mouth WDL  Neck WDL  Breast/Chest WDL  Shoulder Blades WDL  Spine WDL  (R) Arm/Elbow/Hand WDL  Multiple finger amputation  (L) Arm/Elbow/Hand WDL contraction on fingers   Abdomen WDL  Groin WDL  Scrotum/Coccyx/Buttocks WDL  (R) Leg WDL  (L) Leg WDL  (R) Heel/Foot/Toe Ulcer(s)  (L) Heel/Foot/Toe WDL          Devices In Places Tele Box and Pulse Ox      Interventions In Place Pillows    Possible Skin Injury Yes    Pictures Uploaded Into Epic Yes  Wound Consult Placed Yes  RN Wound Prevention Protocol Ordered No

## 2023-08-15 NOTE — PROGRESS NOTES
[facility] Medicine Daily Progress Note    Date of Service  8/15/2023    Chief Complaint  Mary Martinez is a 53 y.o. 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   Abdominal/pelvic CT without contrast showing Punctate hyperdensity in the distal CBD, which is dilated up to 1 cm which is concerning for choledocholithiasis.     Interval Problem Update  -Notes were reviewed from ED, radiology, nursing etc.  -Reviewed labs to date, microbiology for current admit and prior  -Imaging was independently reviewed and interpreted    Noted hypokalemia with K 2.8, phos 2.1, received IV replacement  I have reviewed CT abdomen and have reviewed CT independently, ?choledocholithiasis and air fluid level suggesting diarrhea  MRCP ordered  LFT and total bilirubin normal     I have discussed this patient's plan of care and discharge plan at IDT rounds today with Case Management, Nursing, Nursing leadership, and other members of the IDT team.    Consultants/Specialty  na    Code Status  DNAR/DNI    Disposition  The patient is not medically cleared for discharge to home or a post-acute facility.      I have placed the appropriate orders for post-discharge needs.    Review of Systems  Review of Systems   Constitutional:  Positive for malaise/fatigue.   Gastrointestinal:  Positive for diarrhea, nausea and vomiting.   Neurological:  Positive for dizziness and weakness.   All other systems reviewed and are negative.       Physical Exam  Temp:  [36.3 °C (97.3 °F)] 36.3 °C (97.3 °F)  Pulse:   [] (P) 75  Resp:  [13-20] (P) 16  BP: ()/(53-76) (P) 91/63  SpO2:  [89 %-100 %] (P) 96 %    Physical Exam  Vitals and nursing note reviewed.   Constitutional:       Appearance: Normal appearance. She is ill-appearing.      Comments: cachetic   HENT:      Head: Normocephalic and atraumatic.      Nose: Nose normal.      Mouth/Throat:      Pharynx: Oropharynx is clear.   Eyes:      Extraocular Movements: Extraocular movements intact.      Conjunctiva/sclera: Conjunctivae normal.      Pupils: Pupils are equal, round, and reactive to light.   Cardiovascular:      Rate and Rhythm: Normal rate and regular rhythm.      Pulses: Normal pulses.      Heart sounds: Normal heart sounds.   Pulmonary:      Effort: Pulmonary effort is normal.      Breath sounds: Normal breath sounds.   Abdominal:      General: Abdomen is flat. Bowel sounds are normal.      Palpations: Abdomen is soft.      Comments: PEG in place   Musculoskeletal:         General: Deformity (fingers) present. Normal range of motion.      Cervical back: Normal range of motion and neck supple.   Skin:     General: Skin is warm and dry.   Neurological:      General: No focal deficit present.      Mental Status: She is alert and oriented to person, place, and time. Mental status is at baseline.   Psychiatric:         Mood and Affect: Mood normal.         Behavior: Behavior normal.         Fluids  No intake or output data in the 24 hours ending 08/15/23 1422    Laboratory  Recent Labs     08/14/23  1643 08/15/23  0200 08/15/23  0710   WBC 10.8 10.6  --    RBC 3.26* 2.97*  --    HEMOGLOBIN 10.4* 9.5* 9.5*   HEMATOCRIT 31.6* 28.8*  --    MCV 96.9 97.0  --    MCH 31.9 32.0  --    MCHC 32.9 33.0  --    RDW 50.7* 50.6*  --    PLATELETCT 435 333  --    MPV 9.9 9.7  --      Recent Labs     08/14/23  1643 08/15/23  0014 08/15/23  0849   SODIUM 134* 133* 134*   POTASSIUM 2.3* 2.8* 3.3*   CHLORIDE 106 109 108   CO2 10* 11* 11*   GLUCOSE 212* 96 97   BUN 30* 24* 19    CREATININE 0.97 0.61 0.60   CALCIUM 8.9 7.9* 7.7*                   Imaging  CT-ABDOMEN-PELVIS W/O   Final Result      1.  Punctate hyperdensity in the distal CBD, which is dilated up to 1 cm. This may represent choledocholithiasis. Consider MRCP.   2.  Air-fluid levels in the colon, suggestive of diarrhea.   3.  Bilateral medullary nephrocalcinosis and bilateral nonobstructing renal stones.      DX-CHEST-PORTABLE (1 VIEW)   Final Result      No acute cardiopulmonary abnormality.         TZ-[id number]-S/O    (Results Pending)        Assessment/Plan  * Hypokalemia- (present on admission)  Assessment & Plan  IV and p.o. repletion  Frequent BMP  Check Mag    Common bile duct dilation- (present on admission)  Assessment & Plan  1 cm dilation noted on CT imaging. I have reviewed abd CT independently  Possibly contributing to abdominal pain and nausea, vomiting  Keep n.p.o.  IV antibiotics for now  Supportive management  Pending MRCP    Normocytic anemia- (present on admission)  Assessment & Plan  Hemoglobin around baseline  Reports melena  N.p.o. for now  FOBT  Frequent H&H, transfuse if less than 7  Iron profile c/w JOO, ordered iron replacement       Bipolar 1 disorder (HCC)- (present on admission)  Assessment & Plan  Continue home meds    Starvation ketoacidosis- (present on admission)  Assessment & Plan  History of  IV hydration    Severe protein-calorie malnutrition (HCC)- (present on admission)  Assessment & Plan  Secondary to multiple surgeries, SMA syndrome and chronic in nature  Keep n.p.o. for now  Reinitiate tube feeds when tolerable    DNR (do not resuscitate)- (present on admission)  Assessment & Plan  DNAR: I discussed code status with patient  who at time of discussion had medical decision making capacity. The patient wishes to be DNAR and under no circumstances would want life sustaining treatments in the event that he has a cardiac arrest. I also discussed intubation including temporary courses and he  does not wish to be placed on a ventilator under any circumstances, including for temporary and reversible causes. This discussion was had with his wife in the room.   ACP time: 16 mins    Hypophosphatemia- (present on admission)  Assessment & Plan  Replete    High anion gap metabolic acidosis- (present on admission)  Assessment & Plan  Likely secondary to starvation ketoacidosis  Aggressive IV hydration  Cont to monitor         VTE prophylaxis:   SCDs/TEDs      I have performed a physical exam and reviewed and updated ROS and Plan today (8/15/2023). In review of yesterday's note (8/14/2023), there are no changes except as documented above.    Patient is has a high medical complexity, complex decision making and is at high risk for complication, morbidity, and mortality.  I spent 53 minutes, reviewing the chart, obtaining and/or reviewing separately obtained history. Performing a medically appropriate examination and evaluation.  Counseling and educating the patient. Ordering and reviewing medications, tests, or procedures. Documenting clinical information in EPIC. Independently interpreting results and communicating results to patient. Discussing future disposition of care with patient, RN and case management.  This does not include time spent on separately billable procedures/tests.

## 2023-08-15 NOTE — H&P
Hospital Medicine History & Physical Note    Date of Service  8/14/2023    Primary Care Physician  Fidencio Christopher D.O.    Consultants  None    Code Status  DNAR/DNI    Chief Complaint  Chief Complaint   Patient presents with    Abdominal Pain     Pt BIB EMS from home with complaints of abd pain centrally located. Pt had G tube placed in July ans has had pain off and on since then. Tried to give herself a boost this morning and had immediate serious pain so called EMS.     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  Patient reports that    At the emergency department, vital signs with tachycardia in the 100s, tachypnea 20s, BP in the 80s/50s.  Patient satting well room air.  CBC with normocytic anemia with hemoglobin at 10.  Chemistry with hyponatremia, hypokalemia, hypophosphatemia, high anion gap metabolic acidosis with bicarbonate of 10 and BUN 30.  Chest x-ray with no acute cardiopulmonary process as per my read.  Abdominal/pelvic CT without contrast showing Punctate hyperdensity in the distal CBD, which is dilated up to 1 cm which is concerning for choledocholithiasis. Patient received sepsis bolus and 1 dose of KCl.  Patient was admitted for severe hypokalemia, anion gap metabolic acidosis, inability to tolerate meals all of which are secondary to distal CBD dilation concerning for obstruction which requires supportive management, telemetry monitoring, frequent lab monitoring, and urgent gastroenterology evaluation.    I discussed the plan of  care with patient and bedside RN.    Review of Systems  Review of Systems   Constitutional:  Positive for malaise/fatigue.   HENT: Negative.     Eyes: Negative.    Respiratory: Negative.     Cardiovascular: Negative.    Gastrointestinal:  Positive for abdominal pain, nausea and vomiting.   Genitourinary: Negative.    Musculoskeletal: Negative.    Skin: Negative.    Neurological:  Positive for dizziness and weakness.   Endo/Heme/Allergies: Negative.    Psychiatric/Behavioral: Negative.         Past Medical History   has a past medical history of Acute renal failure (ARF) ([facility]), Allergy, Anemia, Anxiety, Arthritis, ASTHMA, Blood transfusion without reported diagnosis, Bowel habit changes, Bronchitis (last approx 2018), Chronic pain (04/24/2020), Dental disorder, Depression, GERD (gastroesophageal reflux disease), GIB (gastrointestinal bleeding), Head ache, Heart burn, Hiatus hernia syndrome, History of pancreatitis, Hypokalemia, Hyponatremia, Idiopathic chronic pancreatitis (HCC), Indigestion, Intractable nausea and vomiting, Kidney disease, Pain, Pneumonia (10/2019), PONV (postoperative nausea and vomiting), Psychiatric problem, Rheumatoid aortitis, Rheumatoid arthritis(714.0) (2003), SMAS (superior mesenteric artery syndrome) ([facility]), and Substance abuse ([facility]).    Surgical History   has a past surgical history that includes abdominal abscess drainage (9/27/2011); toe fusion (Right, 5/27/2015); bone spur excision (5/27/2015); hammertoe correction (5/27/2015); foot reconstruction rheumatic (Left, 7/29/2015); arthrodesis (Right, 5/6/2016); fusion, pip joint, toe (Right, 8/29/2016); bone graft (Right, 8/29/2016); irrigation & debridement ortho (Right, 9/11/2016); finger amputation (Right, 9/14/2016); finger arthroplasty (Right, 4/17/2017); finger arthroplasty (Right, 6/5/2017); finger amputation (6/5/2017); pr exploratory of abdomen (N/A, 10/4/2019); tonsillectomy (1982); primary c section (1991); repeat c section (1999);  repeat c section (2005); repeat c section (2008); appendectomy (2011); nicholas by laparoscopy (9/27/2011); wrist exploration (Left, 1980's); colonoscopy (2018); dental extraction(s) (2014); pr endoscopic us exam, esoph (4/29/2020); gastroscopy-endo (4/29/2020); egd with asp/bx (4/29/2020); pr upper gi endoscopy,diagnosis (N/A, 9/9/2022); egd w/endoscopic ultrasound (N/A, 9/9/2022); pr place percut gastrostomy tube (N/A, 6/12/2023); and pr upper gi endoscopy,diagnosis (6/12/2023).     Family History  family history includes Cancer in her mother; Heart Disease in her father and mother; Hypertension in her father and mother.     Social History   reports that she has been smoking cigarettes. She has a 15.00 pack-year smoking history. She has never used smokeless tobacco. She reports that she does not drink alcohol and does not use drugs.    Allergies  Allergies   Allergen Reactions    Penicillins Anaphylaxis and Hives     Tolerates cephalosporins; reports throat swelling with PCN    Aripiprazole Nausea     Spasms, shaking      Nitrous Oxide Vomiting       Medications  Prior to Admission Medications   Prescriptions Last Dose Informant Patient Reported? Taking?   Naloxone (NARCAN) 4 MG/0.1ML Liquid  Patient's Home Pharmacy No No   Sig: One spray in one nostril for overdose and call 911.   PARoxetine (PAXIL) 30 MG Tab  Patient's Home Pharmacy Yes No   Sig: Take 60 mg by mouth every evening. 2 tablet (60 mg)   QUEtiapine (SEROQUEL) 100 MG Tab  Patient's Home Pharmacy No No   Sig: Take 1 Tablet by mouth every evening.   omeprazole (PRILOSEC) 20 MG delayed-release capsule  Patient's Home Pharmacy No No   Sig: Take 1 Capsule by mouth every day.   ondansetron (ZOFRAN ODT) 4 MG TABLET DISPERSIBLE   No No   Sig: Take 1 Tablet by mouth every 6 hours as needed for Nausea/Vomiting.   oxyCODONE-acetaminophen (PERCOCET) 7.5-325 MG per tablet   No No   Sig: Take 1 Tablet by mouth every 6 hours as needed for Severe Pain for up to 30 days.    oxyCODONE-acetaminophen (PERCOCET) 7.5-325 MG per tablet   No No   Sig: Take 1 Tablet by mouth every 6 hours as needed for Severe Pain for up to 30 days.   oxyCODONE-acetaminophen (PERCOCET) 7.5-325 MG per tablet   No No   Sig: Take 1 Tablet by mouth every 6 hours as needed for Severe Pain for up to 30 days.   thiamine (THIAMINE) 100 MG tablet   No No   Sig: Take 1 Tablet by mouth every day for 90 days.      Facility-Administered Medications: None       Physical Exam  Temp:  [36.3 °C (97.3 °F)] 36.3 °C (97.3 °F)  Pulse:  [] 83  Resp:  [13-20] 20  BP: (86)/(59-61) 86/59  SpO2:  [99 %-100 %] 99 %  Blood Pressure: (!) 86/59   Temperature: 36.3 °C (97.3 °F)   Pulse: 83   Respiration: 20   Pulse Oximetry: 99 %       Physical Exam  Constitutional:       Appearance: She is ill-appearing.      Comments: Frail and cachectic   HENT:      Head: Normocephalic and atraumatic.      Mouth/Throat:      Mouth: Mucous membranes are dry.   Eyes:      Extraocular Movements: Extraocular movements intact.      Pupils: Pupils are equal, round, and reactive to light.   Cardiovascular:      Rate and Rhythm: Normal rate and regular rhythm.      Pulses: Normal pulses.      Heart sounds: Normal heart sounds.   Pulmonary:      Effort: Pulmonary effort is normal.      Breath sounds: Normal breath sounds.   Abdominal:      General: Bowel sounds are normal. There is distension.      Palpations: Abdomen is soft.      Tenderness: There is no abdominal tenderness.      Comments: G-tube inside and without purulent secretion noted   Musculoskeletal:         General: No swelling. Normal range of motion.      Cervical back: Normal range of motion and neck supple.      Right lower leg: No edema.      Left lower leg: No edema.      Comments: Missing fingers   Skin:     General: Skin is warm.      Coloration: Skin is not jaundiced.   Neurological:      General: No focal deficit present.      Mental Status: She is alert and oriented to person, place,  and time. Mental status is at baseline.      Cranial Nerves: No cranial nerve deficit.   Psychiatric:         Mood and Affect: Mood normal.         Behavior: Behavior normal.         Thought Content: Thought content normal.         Judgment: Judgment normal.         Laboratory:  Recent Labs     08/14/23  1643   WBC 10.8   RBC 3.26*   HEMOGLOBIN 10.4*   HEMATOCRIT 31.6*   MCV 96.9   MCH 31.9   MCHC 32.9   RDW 50.7*   PLATELETCT 435   MPV 9.9     Recent Labs     08/14/23  1643   SODIUM 134*   POTASSIUM 2.3*   CHLORIDE 106   CO2 10*   GLUCOSE 212*   BUN 30*   CREATININE 0.97   CALCIUM 8.9     Recent Labs     08/14/23  1643   ALTSGPT <5   ASTSGOT 12   ALKPHOSPHAT 120*   TBILIRUBIN 0.2   GLUCOSE 212*         No results for input(s): NTPROBNP in the last 72 hours.      No results for input(s): TROPONINT in the last 72 hours.    Imaging:  CT-ABDOMEN-PELVIS W/O   Final Result      1.  Punctate hyperdensity in the distal CBD, which is dilated up to 1 cm. This may represent choledocholithiasis. Consider MRCP.   2.  Air-fluid levels in the colon, suggestive of diarrhea.   3.  Bilateral medullary nephrocalcinosis and bilateral nonobstructing renal stones.      DX-CHEST-PORTABLE (1 VIEW)   Final Result      No acute cardiopulmonary abnormality.         GH-RPPIMMF-C/O    (Results Pending)     Assessment/Plan:  Justification for Admission Status  I anticipate this patient will require at least two midnights for appropriate medical management, necessitating inpatient admission because of below      * Hypokalemia- (present on admission)  Assessment & Plan  IV and p.o. repletion  Frequent BMP    High anion gap metabolic acidosis- (present on admission)  Assessment & Plan  Likely secondary to starvation ketoacidosis  Aggressive IV hydration  Hold off on bicarb drip in the setting of severe hypokalemia  Aggressive IV hydration  Frequent BMP    Common bile duct dilation- (present on admission)  Assessment & Plan  1 cm dilation noted on  CT imaging  Possibly contributing to abdominal pain and nausea, vomiting  Keep n.p.o.  IV antibiotics for now  Supportive management  Pending MRCP    Normocytic anemia- (present on admission)  Assessment & Plan  Hemoglobin around baseline  Reports melena  N.p.o. for now  Pending iron studies  FOBT  Frequent H&H, transfuse if less than 7  Labs in a.m.    Bipolar 1 disorder (HCC)- (present on admission)  Assessment & Plan  Continue home meds    Starvation ketoacidosis- (present on admission)  Assessment & Plan  History of  IV hydration  Labs in a.m.    Severe protein-calorie malnutrition (HCC)- (present on admission)  Assessment & Plan  Secondary to multiple surgeries, SMA syndrome and chronic in nature  Keep n.p.o. for now  Reinitiate tube feeds when tolerable    DNR (do not resuscitate)- (present on admission)  Assessment & Plan  Patient requesting to remain DNR/DNI, order placed    Hypophosphatemia- (present on admission)  Assessment & Plan  Replete        VTE prophylaxis: SCDs/TEDs

## 2023-08-15 NOTE — ASSESSMENT & PLAN NOTE
DNAR: I discussed code status with patient  who at time of discussion had medical decision making capacity. The patient wishes to be DNAR and under no circumstances would want life sustaining treatments in the event that he has a cardiac arrest. I also discussed intubation including temporary courses and he does not wish to be placed on a ventilator under any circumstances, including for temporary and reversible causes. This discussion was had with his wife in the room.

## 2023-08-15 NOTE — ASSESSMENT & PLAN NOTE
Report dark stool. Hb dropping  I have consulted and discussed with GI, planning EGD  Iron profile c/w JOO. On iron replacement protocol

## 2023-08-15 NOTE — ASSESSMENT & PLAN NOTE
1 cm dilation noted on CT imaging. I have reviewed abd CT independently  Possibly contributing to abdominal pain and nausea, vomiting  MRCP unremarkable

## 2023-08-15 NOTE — ED NOTES
Pt requesting pain medications when they are due. Pt made aware pain meds due at 2200. Pt verbalizes understanding and agreeable.

## 2023-08-15 NOTE — ASSESSMENT & PLAN NOTE
Secondary to multiple surgeries, SMA syndrome and chronic in nature  On po oral diet and PEG tube feeding with boost  Reinitiate tube feeds and oral diet when able

## 2023-08-15 NOTE — ED NOTES
Potassium came back at 3.3. Patient unable to tolerate Potassium infusion. MD notified. Requested to only run potassium/ phosphate.

## 2023-08-15 NOTE — ED NOTES
Received bedside report; assumed care of Pt.    No oxygen.  No SI precautions.  High fall risk precautions.    Introduced self to Pt; informed her that I am part of her care team.  Rounded on Pt. Updated Pt on plan of care. Pt verbalized understanding.  No acute distress at this time.  Will continue to monitor.

## 2023-08-15 NOTE — ED NOTES
Bedside report received from [place] PERDOMO. Patient resting on gurney. Patient A&O x4. Patient on monitor, VSS, RR regular and unlabored. Call light within reach. Lab reported a potassium of 5.6, lab will come to redraw potassium.

## 2023-08-16 ENCOUNTER — APPOINTMENT (OUTPATIENT)
Dept: RADIOLOGY | Facility: MEDICAL CENTER | Age: 53
DRG: 640 | End: 2023-08-16
Attending: STUDENT IN AN ORGANIZED HEALTH CARE EDUCATION/TRAINING PROGRAM
Payer: MEDICAID

## 2023-08-16 ENCOUNTER — ANESTHESIA (OUTPATIENT)
Dept: SURGERY | Facility: MEDICAL CENTER | Age: 53
DRG: 640 | End: 2023-08-16
Payer: MEDICAID

## 2023-08-16 ENCOUNTER — ANESTHESIA EVENT (OUTPATIENT)
Dept: SURGERY | Facility: MEDICAL CENTER | Age: 53
DRG: 640 | End: 2023-08-16
Payer: MEDICAID

## 2023-08-16 LAB
ALBUMIN SERPL BCP-MCNC: 3 G/DL (ref 3.2–4.9)
ALBUMIN/GLOB SERPL: 1.2 G/DL
ALP SERPL-CCNC: 102 U/L (ref 30–99)
ALT SERPL-CCNC: 29 U/L (ref 2–50)
ANION GAP SERPL CALC-SCNC: 12 MMOL/L (ref 7–16)
AST SERPL-CCNC: 39 U/L (ref 12–45)
BILIRUB SERPL-MCNC: <0.2 MG/DL (ref 0.1–1.5)
BUN SERPL-MCNC: 10 MG/DL (ref 8–22)
C DIFF DNA SPEC QL NAA+PROBE: NEGATIVE
C DIFF TOX GENS STL QL NAA+PROBE: NEGATIVE
CALCIUM ALBUM COR SERPL-MCNC: 8.9 MG/DL (ref 8.5–10.5)
CALCIUM SERPL-MCNC: 8.1 MG/DL (ref 8.5–10.5)
CHLORIDE SERPL-SCNC: 112 MMOL/L (ref 96–112)
CO2 SERPL-SCNC: 14 MMOL/L (ref 20–33)
CREAT SERPL-MCNC: 0.66 MG/DL (ref 0.5–1.4)
ERYTHROCYTE [DISTWIDTH] IN BLOOD BY AUTOMATED COUNT: 50.8 FL (ref 35.9–50)
GFR SERPLBLD CREATININE-BSD FMLA CKD-EPI: 106 ML/MIN/1.73 M 2
GLOBULIN SER CALC-MCNC: 2.5 G/DL (ref 1.9–3.5)
GLUCOSE SERPL-MCNC: 79 MG/DL (ref 65–99)
GRAM STN SPEC: NORMAL
HCT VFR BLD AUTO: 23.1 % (ref 37–47)
HCT VFR BLD AUTO: 23.3 % (ref 37–47)
HEMOCCULT STL QL: NEGATIVE
HGB BLD-MCNC: 7.5 G/DL (ref 12–16)
HGB BLD-MCNC: 7.8 G/DL (ref 12–16)
MAGNESIUM SERPL-MCNC: 1.7 MG/DL (ref 1.5–2.5)
MCH RBC QN AUTO: 31.5 PG (ref 27–33)
MCHC RBC AUTO-ENTMCNC: 32.5 G/DL (ref 32.2–35.5)
MCV RBC AUTO: 97.1 FL (ref 81.4–97.8)
PHOSPHATE SERPL-MCNC: 2.1 MG/DL (ref 2.5–4.5)
PLATELET # BLD AUTO: 315 K/UL (ref 164–446)
PMV BLD AUTO: 9.7 FL (ref 9–12.9)
POTASSIUM SERPL-SCNC: 3.3 MMOL/L (ref 3.6–5.5)
PROT SERPL-MCNC: 5.5 G/DL (ref 6–8.2)
RBC # BLD AUTO: 2.38 M/UL (ref 4.2–5.4)
SIGNIFICANT IND 70042: NORMAL
SITE SITE: NORMAL
SODIUM SERPL-SCNC: 138 MMOL/L (ref 135–145)
SOURCE SOURCE: NORMAL
WBC # BLD AUTO: 7.1 K/UL (ref 4.8–10.8)

## 2023-08-16 PROCEDURE — 700111 HCHG RX REV CODE 636 W/ 250 OVERRIDE (IP): Performed by: STUDENT IN AN ORGANIZED HEALTH CARE EDUCATION/TRAINING PROGRAM

## 2023-08-16 PROCEDURE — 700105 HCHG RX REV CODE 258: Mod: JZ | Performed by: STUDENT IN AN ORGANIZED HEALTH CARE EDUCATION/TRAINING PROGRAM

## 2023-08-16 PROCEDURE — 160048 HCHG OR STATISTICAL LEVEL 1-5: Performed by: INTERNAL MEDICINE

## 2023-08-16 PROCEDURE — 700102 HCHG RX REV CODE 250 W/ 637 OVERRIDE(OP): Performed by: STUDENT IN AN ORGANIZED HEALTH CARE EDUCATION/TRAINING PROGRAM

## 2023-08-16 PROCEDURE — C9113 INJ PANTOPRAZOLE SODIUM, VIA: HCPCS | Performed by: STUDENT IN AN ORGANIZED HEALTH CARE EDUCATION/TRAINING PROGRAM

## 2023-08-16 PROCEDURE — 700111 HCHG RX REV CODE 636 W/ 250 OVERRIDE (IP): Performed by: ANESTHESIOLOGY

## 2023-08-16 PROCEDURE — 770020 HCHG ROOM/CARE - TELE (206)

## 2023-08-16 PROCEDURE — 85014 HEMATOCRIT: CPT

## 2023-08-16 PROCEDURE — 700101 HCHG RX REV CODE 250: Performed by: ANESTHESIOLOGY

## 2023-08-16 PROCEDURE — A9270 NON-COVERED ITEM OR SERVICE: HCPCS | Performed by: STUDENT IN AN ORGANIZED HEALTH CARE EDUCATION/TRAINING PROGRAM

## 2023-08-16 PROCEDURE — 84100 ASSAY OF PHOSPHORUS: CPT

## 2023-08-16 PROCEDURE — 80053 COMPREHEN METABOLIC PANEL: CPT

## 2023-08-16 PROCEDURE — 160009 HCHG ANES TIME/MIN: Performed by: INTERNAL MEDICINE

## 2023-08-16 PROCEDURE — 43235 EGD DIAGNOSTIC BRUSH WASH: CPT | Performed by: INTERNAL MEDICINE

## 2023-08-16 PROCEDURE — 700101 HCHG RX REV CODE 250: Performed by: STUDENT IN AN ORGANIZED HEALTH CARE EDUCATION/TRAINING PROGRAM

## 2023-08-16 PROCEDURE — 99254 IP/OBS CNSLTJ NEW/EST MOD 60: CPT | Mod: 25 | Performed by: INTERNAL MEDICINE

## 2023-08-16 PROCEDURE — 160202 HCHG ENDO MINUTES - 1ST 30 MINS LEVEL 3: Performed by: INTERNAL MEDICINE

## 2023-08-16 PROCEDURE — 0DJ08ZZ INSPECTION OF UPPER INTESTINAL TRACT, VIA NATURAL OR ARTIFICIAL OPENING ENDOSCOPIC: ICD-10-PCS | Performed by: INTERNAL MEDICINE

## 2023-08-16 PROCEDURE — 85027 COMPLETE CBC AUTOMATED: CPT

## 2023-08-16 PROCEDURE — 160002 HCHG RECOVERY MINUTES (STAT): Performed by: INTERNAL MEDICINE

## 2023-08-16 PROCEDURE — 160035 HCHG PACU - 1ST 60 MINS PHASE I: Performed by: INTERNAL MEDICINE

## 2023-08-16 PROCEDURE — 83735 ASSAY OF MAGNESIUM: CPT

## 2023-08-16 PROCEDURE — 85018 HEMOGLOBIN: CPT

## 2023-08-16 PROCEDURE — 700111 HCHG RX REV CODE 636 W/ 250 OVERRIDE (IP): Mod: JZ | Performed by: STUDENT IN AN ORGANIZED HEALTH CARE EDUCATION/TRAINING PROGRAM

## 2023-08-16 PROCEDURE — 99233 SBSQ HOSP IP/OBS HIGH 50: CPT | Performed by: STUDENT IN AN ORGANIZED HEALTH CARE EDUCATION/TRAINING PROGRAM

## 2023-08-16 PROCEDURE — 700105 HCHG RX REV CODE 258: Performed by: STUDENT IN AN ORGANIZED HEALTH CARE EDUCATION/TRAINING PROGRAM

## 2023-08-16 RX ORDER — LIDOCAINE HYDROCHLORIDE 20 MG/ML
INJECTION, SOLUTION EPIDURAL; INFILTRATION; INTRACAUDAL; PERINEURAL PRN
Status: DISCONTINUED | OUTPATIENT
Start: 2023-08-16 | End: 2023-08-16 | Stop reason: SURG

## 2023-08-16 RX ORDER — OXYCODONE HYDROCHLORIDE 5 MG/1
5 TABLET ORAL EVERY 6 HOURS PRN
Status: DISCONTINUED | OUTPATIENT
Start: 2023-08-16 | End: 2023-08-17 | Stop reason: HOSPADM

## 2023-08-16 RX ORDER — HYDROMORPHONE HYDROCHLORIDE 1 MG/ML
0.4 INJECTION, SOLUTION INTRAMUSCULAR; INTRAVENOUS; SUBCUTANEOUS
Status: DISCONTINUED | OUTPATIENT
Start: 2023-08-16 | End: 2023-08-16 | Stop reason: HOSPADM

## 2023-08-16 RX ORDER — ONDANSETRON 2 MG/ML
4 INJECTION INTRAMUSCULAR; INTRAVENOUS
Status: DISCONTINUED | OUTPATIENT
Start: 2023-08-16 | End: 2023-08-16 | Stop reason: HOSPADM

## 2023-08-16 RX ORDER — DIPHENHYDRAMINE HYDROCHLORIDE 50 MG/ML
12.5 INJECTION INTRAMUSCULAR; INTRAVENOUS
Status: DISCONTINUED | OUTPATIENT
Start: 2023-08-16 | End: 2023-08-16 | Stop reason: HOSPADM

## 2023-08-16 RX ORDER — SODIUM CHLORIDE, SODIUM LACTATE, POTASSIUM CHLORIDE, CALCIUM CHLORIDE 600; 310; 30; 20 MG/100ML; MG/100ML; MG/100ML; MG/100ML
INJECTION, SOLUTION INTRAVENOUS CONTINUOUS
Status: DISCONTINUED | OUTPATIENT
Start: 2023-08-16 | End: 2023-08-16 | Stop reason: HOSPADM

## 2023-08-16 RX ORDER — OXYCODONE HYDROCHLORIDE 15 MG/1
7.5 TABLET ORAL EVERY 6 HOURS PRN
Status: DISCONTINUED | OUTPATIENT
Start: 2023-08-16 | End: 2023-08-17 | Stop reason: HOSPADM

## 2023-08-16 RX ORDER — HYDROMORPHONE HYDROCHLORIDE 1 MG/ML
0.1 INJECTION, SOLUTION INTRAMUSCULAR; INTRAVENOUS; SUBCUTANEOUS
Status: DISCONTINUED | OUTPATIENT
Start: 2023-08-16 | End: 2023-08-16 | Stop reason: HOSPADM

## 2023-08-16 RX ORDER — HYDROMORPHONE HYDROCHLORIDE 1 MG/ML
0.2 INJECTION, SOLUTION INTRAMUSCULAR; INTRAVENOUS; SUBCUTANEOUS
Status: DISCONTINUED | OUTPATIENT
Start: 2023-08-16 | End: 2023-08-16 | Stop reason: HOSPADM

## 2023-08-16 RX ORDER — HALOPERIDOL 5 MG/ML
1 INJECTION INTRAMUSCULAR
Status: DISCONTINUED | OUTPATIENT
Start: 2023-08-16 | End: 2023-08-16 | Stop reason: HOSPADM

## 2023-08-16 RX ADMIN — OXYCODONE HYDROCHLORIDE 5 MG: 5 TABLET ORAL at 04:33

## 2023-08-16 RX ADMIN — Medication 100 MG: at 04:33

## 2023-08-16 RX ADMIN — PROPOFOL 40 MG: 10 INJECTION, EMULSION INTRAVENOUS at 14:35

## 2023-08-16 RX ADMIN — PANTOPRAZOLE SODIUM 40 MG: 40 INJECTION, POWDER, FOR SOLUTION INTRAVENOUS at 04:34

## 2023-08-16 RX ADMIN — PANTOPRAZOLE SODIUM 40 MG: 40 INJECTION, POWDER, FOR SOLUTION INTRAVENOUS at 17:42

## 2023-08-16 RX ADMIN — PAROXETINE 60 MG: 30 TABLET, FILM COATED ORAL at 17:43

## 2023-08-16 RX ADMIN — LIDOCAINE HYDROCHLORIDE 40 MG: 20 INJECTION, SOLUTION EPIDURAL; INFILTRATION; INTRACAUDAL at 14:32

## 2023-08-16 RX ADMIN — SODIUM CHLORIDE 250 MG: 9 INJECTION, SOLUTION INTRAVENOUS at 17:49

## 2023-08-16 RX ADMIN — OXYCODONE HYDROCHLORIDE 7.5 MG: 15 TABLET ORAL at 11:03

## 2023-08-16 RX ADMIN — OXYCODONE HYDROCHLORIDE 7.5 MG: 15 TABLET ORAL at 20:37

## 2023-08-16 RX ADMIN — FENTANYL CITRATE 50 MCG: 50 INJECTION, SOLUTION INTRAMUSCULAR; INTRAVENOUS at 14:30

## 2023-08-16 RX ADMIN — HYDROMORPHONE HYDROCHLORIDE 0.25 MG: 1 INJECTION, SOLUTION INTRAMUSCULAR; INTRAVENOUS; SUBCUTANEOUS at 06:32

## 2023-08-16 RX ADMIN — QUETIAPINE FUMARATE 100 MG: 100 TABLET ORAL at 17:42

## 2023-08-16 RX ADMIN — METRONIDAZOLE 500 MG: 5 INJECTION, SOLUTION INTRAVENOUS at 04:35

## 2023-08-16 RX ADMIN — PROPOFOL 50 MG: 10 INJECTION, EMULSION INTRAVENOUS at 14:32

## 2023-08-16 RX ADMIN — POTASSIUM PHOSPHATE, MONOBASIC AND POTASSIUM PHOSPHATE, DIBASIC 30 MMOL: 224; 236 INJECTION, SOLUTION, CONCENTRATE INTRAVENOUS at 08:35

## 2023-08-16 RX ADMIN — SODIUM CHLORIDE, POTASSIUM CHLORIDE, SODIUM LACTATE AND CALCIUM CHLORIDE: 600; 310; 30; 20 INJECTION, SOLUTION INTRAVENOUS at 04:34

## 2023-08-16 RX ADMIN — HYDROMORPHONE HYDROCHLORIDE 0.25 MG: 1 INJECTION, SOLUTION INTRAMUSCULAR; INTRAVENOUS; SUBCUTANEOUS at 00:25

## 2023-08-16 ASSESSMENT — PAIN DESCRIPTION - PAIN TYPE
TYPE: ACUTE PAIN
TYPE: CHRONIC PAIN
TYPE: ACUTE PAIN
TYPE: SURGICAL PAIN
TYPE: SURGICAL PAIN
TYPE: ACUTE PAIN
TYPE: CHRONIC PAIN

## 2023-08-16 ASSESSMENT — FIBROSIS 4 INDEX: FIB4 SCORE: 1.17

## 2023-08-16 ASSESSMENT — ENCOUNTER SYMPTOMS
NAUSEA: 1
DIARRHEA: 1
VOMITING: 1
DIZZINESS: 1
WEAKNESS: 1

## 2023-08-16 ASSESSMENT — PAIN SCALES - GENERAL: PAIN_LEVEL: 0

## 2023-08-16 NOTE — CARE PLAN
The patient is Unstable - High likelihood or risk of patient condition declining or worsening    Shift Goals  Clinical Goals: Monitor Vitals, BP, HR/rhythm, pain management, MRI, cultures, rest  Patient Goals: no pain, rest    Progress made toward(s) clinical / shift goals:    Problem: Pain - Standard  Goal: Alleviation of pain or a reduction in pain to the patient’s comfort goal  Description: Target End Date:  Prior to discharge or change in level of care    Document on Vitals flowsheet    1.  Document pain using the appropriate pain scale per order or unit policy  2.  Educate and implement non-pharmacologic comfort measures (i.e. relaxation, distraction, massage, cold/heat therapy, etc.)  3.  Pain management medications as ordered  4.  Reassess pain after pain med administration per policy  5.  If opiods administered assess patient's response to pain medication is appropriate per POSS sedation scale  6.  Follow pain management plan developed in collaboration with patient and interdisciplinary team (including palliative care or pain specialists if applicable)  Outcome: Not Progressing     Problem: Knowledge Deficit - Standard  Goal: Patient and family/care givers will demonstrate understanding of plan of care, disease process/condition, diagnostic tests and medications  Description: Target End Date:  1-3 days or as soon as patient condition allows    Document in Patient Education    1.  Patient and family/caregiver oriented to unit, equipment, visitation policy and means for communicating concern  2.  Complete/review Learning Assessment  3.  Assess knowledge level of disease process/condition, treatment plan, diagnostic tests and medications  4.  Explain disease process/condition, treatment plan, diagnostic tests and medications  Outcome: Not Progressing     Problem: Skin Integrity  Goal: Skin integrity is maintained or improved  Description: Target End Date:  Prior to discharge or change in level of  care    Document interventions on Skin Risk/Zan flowsheet groups and corresponding LDA    1.  Assess and monitor skin integrity, appearance and/or temperature  2.  Assess risk factors for impaired skin integrity and/or pressures ulcers  3.  Implement precautions to protect skin integrity in collaboration with interdisciplinary team  4.  Implement pressure ulcer prevention protocol if at risk for skin breakdown  5.  Confirm wound care consult if at risk for skin breakdown  6.  Ensure patient use of pressure relieving devices  (Low air loss bed, waffle overlay, heel protectors, ROHO cushion, etc)  Outcome: Not Progressing     Problem: Fall Risk  Goal: Patient will remain free from falls  Description: Target End Date:  Prior to discharge or change in level of care    Document interventions on the [facility] Fall Risk Assessment    1.  Assess for fall risk factors  2.  Implement fall precautions  Outcome: Not Progressing     Problem: Depression  Goal: Patient and family/caregiver will verbalize accurate information about at least two of the possible causes of depression, three-four of the signs and symptoms of depression  Description: Target End Date:  1 to 3 days    1.  Assess the patient's and family/caregiver's knowledge regarding depression and its causes.  2.  Explain to the patient and family/caregiver regarding the major symptoms of depression.  3.  Inform the patient and family/caregiver that depression can be treated through medications and psychotherapy.  4.  Allow the patient to express feelings and perceptions  5.  Express hope to the patient with realistic comments about the patient's strengths and resources.  5.  Give positive feedback after a tasks are achieved.  6.  Encourage identification of positive aspects of self.  7.  Educate the patient about crisis intervention services such as suicide hotlines and other resources.  Outcome: Not Progressing       Patient is not progressing towards the  following goals:      Problem: Pain - Standard  Goal: Alleviation of pain or a reduction in pain to the patient’s comfort goal  Description: Target End Date:  Prior to discharge or change in level of care    Document on Vitals flowsheet    1.  Document pain using the appropriate pain scale per order or unit policy  2.  Educate and implement non-pharmacologic comfort measures (i.e. relaxation, distraction, massage, cold/heat therapy, etc.)  3.  Pain management medications as ordered  4.  Reassess pain after pain med administration per policy  5.  If opiods administered assess patient's response to pain medication is appropriate per POSS sedation scale  6.  Follow pain management plan developed in collaboration with patient and interdisciplinary team (including palliative care or pain specialists if applicable)  Outcome: Not Progressing     Problem: Knowledge Deficit - Standard  Goal: Patient and family/care givers will demonstrate understanding of plan of care, disease process/condition, diagnostic tests and medications  Description: Target End Date:  1-3 days or as soon as patient condition allows    Document in Patient Education    1.  Patient and family/caregiver oriented to unit, equipment, visitation policy and means for communicating concern  2.  Complete/review Learning Assessment  3.  Assess knowledge level of disease process/condition, treatment plan, diagnostic tests and medications  4.  Explain disease process/condition, treatment plan, diagnostic tests and medications  Outcome: Not Progressing     Problem: Skin Integrity  Goal: Skin integrity is maintained or improved  Description: Target End Date:  Prior to discharge or change in level of care    Document interventions on Skin Risk/Zan flowsheet groups and corresponding LDA    1.  Assess and monitor skin integrity, appearance and/or temperature  2.  Assess risk factors for impaired skin integrity and/or pressures ulcers  3.  Implement precautions to  protect skin integrity in collaboration with interdisciplinary team  4.  Implement pressure ulcer prevention protocol if at risk for skin breakdown  5.  Confirm wound care consult if at risk for skin breakdown  6.  Ensure patient use of pressure relieving devices  (Low air loss bed, waffle overlay, heel protectors, ROHO cushion, etc)  Outcome: Not Progressing     Problem: Fall Risk  Goal: Patient will remain free from falls  Description: Target End Date:  Prior to discharge or change in level of care    Document interventions on the [facility] Fall Risk Assessment    1.  Assess for fall risk factors  2.  Implement fall precautions  Outcome: Not Progressing     Problem: Depression  Goal: Patient and family/caregiver will verbalize accurate information about at least two of the possible causes of depression, three-four of the signs and symptoms of depression  Description: Target End Date:  1 to 3 days    1.  Assess the patient's and family/caregiver's knowledge regarding depression and its causes.  2.  Explain to the patient and family/caregiver regarding the major symptoms of depression.  3.  Inform the patient and family/caregiver that depression can be treated through medications and psychotherapy.  4.  Allow the patient to express feelings and perceptions  5.  Express hope to the patient with realistic comments about the patient's strengths and resources.  5.  Give positive feedback after a tasks are achieved.  6.  Encourage identification of positive aspects of self.  7.  Educate the patient about crisis intervention services such as suicide hotlines and other resources.  Outcome: Not Progressing

## 2023-08-16 NOTE — CARE PLAN
"  Problem: Pain - Standard  Goal: Alleviation of pain or a reduction in pain to the patient’s comfort goal  Outcome: Progressing     Problem: Knowledge Deficit - Standard  Goal: Patient and family/care givers will demonstrate understanding of plan of care, disease process/condition, diagnostic tests and medications  Outcome: Progressing     Problem: Skin Integrity  Goal: Skin integrity is maintained or improved  Outcome: Progressing     Problem: Fall Risk  Goal: Patient will remain free from falls  Outcome: Progressing     Problem: Depression  Goal: Patient and family/caregiver will verbalize accurate information about at least two of the possible causes of depression, three-four of the signs and symptoms of depression  Outcome: Progressing   The patient is Stable - Low risk of patient condition declining or worsening    Shift Goals  Clinical Goals: Pain management and safety from fall  Patient Goals: pain control    Progress made toward(s) clinical / shift goals:   Pain was managed with PRN medication and was administered as ordered, patient remains free from fall at this time; waffle overlay was placed to promote skin integrity, call light within reach and bed alarm was kept on,BP 95/67   Pulse 81   Temp 37.1 °C (98.7 °F) (Temporal)   Resp 15   Ht 1.6 m (5' 2.99\")   Wt 39.1 kg (86 lb 3.2 oz)   SpO2 98%   Will continue to monitor         "

## 2023-08-16 NOTE — PROGRESS NOTES
1508- Report received from [facility]. Patient is resting, vital signs are stable. Patient denies nausea and pain at this time.     1525- Patient used bedpan to void.    1530- Report called to LEANNE Harris. Patient meets criteria go back to floor.    1535- Patient transported with RN to T802.

## 2023-08-16 NOTE — OR NURSING
1443 from OR to PACU 3. Connected to monitor. Report received from anesthesia & RN. VSS. 02 6 via mask. Breaths calm, even, unlabored.       1506 report to Aminata PERDOMO

## 2023-08-16 NOTE — WOUND TEAM
Wound consult placed regarding 1st toe. Chart and images reviewed. Pt has small dry crusted scab to the toe. Area is stable and should be left open to air. Wound consult completed.

## 2023-08-16 NOTE — DIETARY
"Nutrition services: Day 2 of admit.  Mary Martinez is a 53 y.o. female with admitting DX of hypokalemia    Consult received for BMI <19, Boost on PEG tube noted in nutrition screening and malnutrition noted in problems list. RD met with pt at bedside. Per pt, she has been eating foods by mouth and not using her feeding tube much. Per pt, she is eating all types of foods. She states she last infused 1 Boost Plus yesterday but experienced a lot of pain. Pt reports a good appetite up until 1 week ago d/t abdominal pain. She reports only drinking broth, water and some soda in the past 1 week. Pt states she does not like the Boost to drink and will only put Boost in her feeding tube. Pt states she has a 20 lb weight loss since June.     Assessment:  Height: 160 cm (5' 2.99\")  Weight: 39.1 kg (86 lb 3.2 oz)- stand up scale  Body mass index is 15.27 kg/m²., BMI classification: underweight  Diet/Intake: NPO/clears x2 days    Evaluation:   Pt admitted for hypokalemia with common bile duct dilation, normocytic anemia, starvation ketoacidosis, bipolar 1 disorder, severe protein-calorie malnutrition, DNR, hypophosphatemia, high anion gap metabolic acidosis  Per GI note today, pt w/ upper gastrointestinal bleeding, continue NPO, pending EGD today  Based on weight history in chart, pt appears to have a weight loss of 3.5 kg/ 8.2% since 6/26 which is considered severe.      Wt Readings from Last 6 Encounters:   08/15/23 39.1 kg (86 lb 3.2 oz)   07/27/23 37.6 kg (83 lb)   06/29/23 36.3 kg (80 lb)   06/27/23 46 kg (101 lb 6.6 oz)   06/26/23 42.6 kg (93 lb 14.7 oz)   06/10/23 38.7 kg (85 lb 5.1 oz)     Nutrition Focused Physical Exam: Severe muscle wasting (shoulder, clavicle, temple region)  Labs: potassium 3.3, calcium 8.1, albumin 3.0, total protein 5.5, phosphorus 2.1  Meds: Protonix, potassium phosphate, vitamin B-1, antiemetics, LR infusion @ 83 ml/hr  Skin: no staged wounds or edema noted. Pt has PEG tube present with " J extension  GI: Last BM 8/15    Malnutrition Risk: Pt with severe malnutrition in the context of chronic illness related to abdominal pain related to SMA syndrome as evidenced by weight loss of 8.2% since 6/26 and severe muscle wasting (clavicle, temple and shoulder region).     Recommendations/Plan:  Advance diet when medically feasible   Document intake of all meals as % taken in ADL's to provide interdisciplinary communication across all shifts.   Monitor weight.    RD following.

## 2023-08-16 NOTE — PROGRESS NOTES
Hospital Medicine Daily Progress Note    Date of Service  8/16/2023    Chief Complaint  Mary Martinez is a 53 y.o. 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   Abdominal/pelvic CT without contrast showing Punctate hyperdensity in the distal CBD, which is dilated up to 1 cm which is concerning for choledocholithiasis.   MRCP unremarkable    Interval Problem Update  -Notes were reviewed from ED, radiology, nursing etc.  -Reviewed labs to date, microbiology for current admit and prior  -Imaging was independently reviewed and interpreted    K 3.3 and phos 2.1, replaced  Noted Hb dropped to 7.5 with dark stool, on PPI, I consulted and discussed with GI. Planing EGD today  Cont monitoring H&H    I have discussed this patient's plan of care and discharge plan at IDT rounds today with Case Management, Nursing, Nursing leadership, and other members of the IDT team.    Consultants/Specialty  na    Code Status  DNAR/DNI    Disposition  The patient is not medically cleared for discharge to home or a post-acute facility.      I have placed the appropriate orders for post-discharge needs.    Review of Systems  Review of Systems   Constitutional:  Positive for malaise/fatigue.   Gastrointestinal:  Positive for diarrhea, nausea and vomiting.   Neurological:  Positive for dizziness and weakness.   All other systems reviewed and are negative.       Physical Exam  Temp:  [36.6 °C (97.9 °F)-37 °C (98.6 °F)] 37 °C (98.6 °F)  Pulse:  [73-96] 93  Resp:  [16-20] 16  BP: (80-98)/(53-70)  97/63  SpO2:  [93 %-95 %] 95 %    Physical Exam  Vitals and nursing note reviewed.   Constitutional:       Appearance: Normal appearance. She is ill-appearing.      Comments: cachetic   HENT:      Head: Normocephalic and atraumatic.      Nose: Nose normal.      Mouth/Throat:      Pharynx: Oropharynx is clear.   Eyes:      Extraocular Movements: Extraocular movements intact.      Conjunctiva/sclera: Conjunctivae normal.      Pupils: Pupils are equal, round, and reactive to light.   Cardiovascular:      Rate and Rhythm: Normal rate and regular rhythm.      Pulses: Normal pulses.      Heart sounds: Normal heart sounds.   Pulmonary:      Effort: Pulmonary effort is normal.      Breath sounds: Normal breath sounds.   Abdominal:      General: Abdomen is flat. Bowel sounds are normal.      Palpations: Abdomen is soft.      Comments: PEG in place   Musculoskeletal:         General: Deformity (fingers) present. Normal range of motion.      Cervical back: Normal range of motion and neck supple.   Skin:     General: Skin is warm and dry.   Neurological:      General: No focal deficit present.      Mental Status: She is alert and oriented to person, place, and time. Mental status is at baseline.   Psychiatric:         Mood and Affect: Mood normal.         Behavior: Behavior normal.         Fluids    Intake/Output Summary (Last 24 hours) at 8/16/2023 1357  Last data filed at 8/16/2023 0435  Gross per 24 hour   Intake 50 ml   Output 400 ml   Net -350 ml       Laboratory  Recent Labs     08/14/23  1643 08/15/23  0200 08/15/23  0710 08/15/23  2139 08/16/23  0432 08/16/23  1144   WBC 10.8 10.6  --   --  7.1  --    RBC 3.26* 2.97*  --   --  2.38*  --    HEMOGLOBIN 10.4* 9.5*   < > 8.3* 7.5* 7.8*   HEMATOCRIT 31.6* 28.8*  --   --  23.1* 23.3*   MCV 96.9 97.0  --   --  97.1  --    MCH 31.9 32.0  --   --  31.5  --    MCHC 32.9 33.0  --   --  32.5  --    RDW 50.7* 50.6*  --   --  50.8*  --    PLATELETCT 435 333  --   --  315  --    MPV  9.9 9.7  --   --  9.7  --     < > = values in this interval not displayed.       Recent Labs     08/15/23  0014 08/15/23  0849 08/16/23  0432   SODIUM 133* 134* 138   POTASSIUM 2.8* 3.3* 3.3*   CHLORIDE 109 108 112   CO2 11* 11* 14*   GLUCOSE 96 97 79   BUN 24* 19 10   CREATININE 0.61 0.60 0.66   CALCIUM 7.9* 7.7* 8.1*                     Imaging  IR-US GUIDED PIV   Final Result    Ultrasound-guided PERIPHERAL IV INSERTION performed by    qualified nursing staff as above.      [id number]   Final Result      1.  No choledocholithiasis.   2.  Prior cholecystectomy.         CT-ABDOMEN-PELVIS W/O   Final Result      1.  Punctate hyperdensity in the distal CBD, which is dilated up to 1 cm. This may represent choledocholithiasis. Consider MRCP.   2.  Air-fluid levels in the colon, suggestive of diarrhea.   3.  Bilateral medullary nephrocalcinosis and bilateral nonobstructing renal stones.      DX-CHEST-PORTABLE (1 VIEW)   Final Result      No acute cardiopulmonary abnormality.                Assessment/Plan  * Hypokalemia- (present on admission)  Assessment & Plan  IV and p.o. repletion  Frequent BMP  Check Mag  8/16: K 3.3 replaced    Common bile duct dilation- (present on admission)  Assessment & Plan  1 cm dilation noted on CT imaging. I have reviewed abd CT independently  Possibly contributing to abdominal pain and nausea, vomiting  MRCP unremarkable     Normocytic anemia- (present on admission)  Assessment & Plan  Report dark stool. Hb dropping  I have consulted and discussed with GI, planning EGD  Iron profile c/w JOO. On iron replacement protocol       Bipolar 1 disorder (HCC)- (present on admission)  Assessment & Plan  Continue home meds    Starvation ketoacidosis- (present on admission)  Assessment & Plan  History of  IV hydration    Severe protein-calorie malnutrition (HCC)- (present on admission)  Assessment & Plan  Secondary to multiple surgeries, SMA syndrome and chronic in nature  On po oral diet and PEG  tube feeding with boost  Reinitiate tube feeds and oral diet when able    DNR (do not resuscitate)- (present on admission)  Assessment & Plan  DNAR: I discussed code status with patient  who at time of discussion had medical decision making capacity. The patient wishes to be DNAR and under no circumstances would want life sustaining treatments in the event that he has a cardiac arrest. I also discussed intubation including temporary courses and he does not wish to be placed on a ventilator under any circumstances, including for temporary and reversible causes. This discussion was had with his wife in the room.       Hypophosphatemia- (present on admission)  Assessment & Plan  Replete    High anion gap metabolic acidosis- (present on admission)  Assessment & Plan  Likely secondary to starvation ketoacidosis  Aggressive IV hydration  Cont to monitor  Improving         VTE prophylaxis:   SCDs/TEDs      I have performed a physical exam and reviewed and updated ROS and Plan today (8/16/2023). In review of yesterday's note (8/15/2023), there are no changes except as documented above.    Patient is has a high medical complexity, complex decision making and is at high risk for complication, morbidity, and mortality.  I spent 53 minutes, reviewing the chart, obtaining and/or reviewing separately obtained history. Performing a medically appropriate examination and evaluation.  Counseling and educating the patient. Ordering and reviewing medications, tests, or procedures. Documenting clinical information in EPIC. Independently interpreting results and communicating results to patient. Discussing future disposition of care with patient, RN and case management.  This does not include time spent on separately billable procedures/tests.

## 2023-08-16 NOTE — DISCHARGE PLANNING
Case Management Discharge Planning    Admission Date: 8/14/2023  GMLOS: 2.6  ALOS: 2    6-Clicks ADL Score: 15  6-Clicks Mobility Score: 15    Anticipated Discharge Dispo: Discharge Disposition: D/T to home hha    LMSW received choice for HH to aid pt at home with her PEG Tube.  LMSW faxed form to [facility].

## 2023-08-16 NOTE — DOCUMENTATION QUERY
[facility]                                                                       Query Response Note      PATIENT:               STEFFANIE ELLISON  ACCT #:                  [id number]  MRN:                     [id number]  :                      1970  ADMIT DATE:       2023 4:19 PM  DISCH DATE:          RESPONDING  PROVIDER #:        [id number]           QUERY TEXT:    Please provide additional clinical indicators supportive of your documented diagnosis of severe protein-calorie malnutrition:              The patient's Clinical Indicators include:  Findings:  --Patient admitted for hypokalemia, metabolic acidosis and CBD dilation  --Per H&P, ''Severe protein-calorie malnutrition second to multiple surgeries, SMA syndrome and chronic in      nature'' documented  --Per H&P Physical Exam:  Ill-appearing, frail, cachectic  --Per ER provider notes , patient has a feeding tube  --BMI:  15.27, weight:  39.1kg, height:  1.6m    Treatment:  --NPO for now  --Reinitiate tube feeds when tolerable  --Weight, height, BMI calculations    Risk Factors:  --SMA syndrome  --Hypokalemia  --Hyponatremia  --Metabolic acidosis  --Feeding tube status    Thank you,  Maryam JETERN, RN  Clinical   Connect via [employer]  Options provided:   -- Severe protein-calorie malnutrition exists, please document additional clinical indicators   -- Severe protein-calorie malnutrition does not exist and amended documentation provided in the medical record   -- Other explanation, please specify   -- Unable to provide additional clarity regarding the diagnosis      Query created by: Maryam Otero on 2023 8:20 AM    RESPONSE TEXT:    severe protein-calorie malnutrition exists -          Electronically signed by:  DESIRAE ENGLAND MD 2023 3:00 PM

## 2023-08-16 NOTE — ANESTHESIA PREPROCEDURE EVALUATION
Case: [id number] Date/Time: 08/16/23 1554    Procedure: GASTROSCOPY (Esophagus)    Anesthesia type: MAC    Location: [facility] ROOM 26 / SURGERY SAME DAY [facility]    Surgeons: Blossom Peres M.D.          Relevant Problems   CARDIAC   (positive) Rheumatoid aortitis      GI   (positive) Hiatal hernia         (positive) HASMUKH (acute kidney injury) (HCC)   (positive) Acute renal failure (HCC)   (positive) Other hydronephrosis   (positive) Renal calculus      Other   (positive) Arthritis, rheumatoid (HCC)   (positive) Rheumatoid arthritis (HCC)   (positive) Rheumatoid arthritis of hand (HCC)       Physical Exam    Airway   Mallampati: III       Cardiovascular   Rhythm: regular  Rate: normal     Dental - normal exam           Pulmonary   Breath sounds clear to auscultation     Abdominal   (+) scaphoid     Neurological              Anesthesia Plan    ASA 3 (opioid dependence, malnourished)   ASA physical status 3 criteria: alcohol and/or substance dependence or abuse    Plan - general       Airway plan will be natural airway          Induction: intravenous    Postoperative Plan: Postoperative administration of opioids is intended.    Pertinent diagnostic labs and testing reviewed    Informed Consent:    Anesthetic plan and risks discussed with patient.    Use of blood products discussed with: whom consented to blood products.

## 2023-08-16 NOTE — DISCHARGE PLANNING
Received [facility] form at [phone]  Agency/Facility Name: [facility]   Referral sent per [facility] form @ [phone]

## 2023-08-16 NOTE — CARE PLAN
"  Problem: Pain - Standard  Goal: Alleviation of pain or a reduction in pain to the patient’s comfort goal  Outcome: Progressing     Problem: Knowledge Deficit - Standard  Goal: Patient and family/care givers will demonstrate understanding of plan of care, disease process/condition, diagnostic tests and medications  Outcome: Progressing     Problem: Skin Integrity  Goal: Skin integrity is maintained or improved  Outcome: Progressing     Problem: Fall Risk  Goal: Patient will remain free from falls  Outcome: Progressing     Problem: Depression  Goal: Patient and family/caregiver will verbalize accurate information about at least two of the possible causes of depression, three-four of the signs and symptoms of depression  Outcome: Progressing   The patient is Watcher - Medium risk of patient condition declining or worsening    Shift Goals  Clinical Goals: monitor VS; safety ambulation  Patient Goals: feel better    Progress made toward(s) clinical / shift goals:  pain was managed with prn medication and administered as ordered, patient unsteady with gait, call light within reach and bed alarm was kept on,BP (!) 88/70   Pulse 73   Temp 36.6 °C (97.9 °F) (Temporal)   Resp 20   Ht 1.6 m (5' 2.99\")   Wt 39.1 kg (86 lb 3.2 oz)   SpO2 95%  will continue to monitor            "

## 2023-08-16 NOTE — DISCHARGE PLANNING
Case Management Discharge Planning    Admission Date: 8/14/2023  GMLOS: 2.6  ALOS: 2    6-Clicks ADL Score: 15  6-Clicks Mobility Score: 15  PT and/or OT Eval ordered: Yes  Post-acute Referrals Ordered: Yes  Post-acute Choice Obtained: No  Has referral(s) been sent to post-acute provider:  No      Anticipated Discharge Dispo: Discharge Disposition: D/T to SNF under Medicaid but not cert under Medicare w/planned hosp IP readmit(92)    DME Needed: No    Action(s) Taken: DC Assessment Complete (See below)    Escalations Completed: None    Medically Clear: No    Barriers to Discharge: Medical clearance    Is the patient up for discharge tomorrow: No      Care Transition Team Assessment    LMSW met with pt at bedside, pt oriented x4. Pt states she has support from spouse, child, and her parents who will be coming to [place].   Pt states she uses a walker and wheelchair.   Pt states being independent most days but also needing assistance depending on how she feels each day.   Pt confirms Medicaid FFS on file.     Information Source  Orientation Level: Oriented X4  Information Given By: Patient  Who is responsible for making decisions for patient? : Patient    Readmission Evaluation  Is this a readmission?: Yes - unplanned readmission    Elopement Risk  Legal Hold: No  Ambulatory or Self Mobile in Wheelchair: No-Not an Elopement Risk  Elopement Risk: Not at Risk for Elopement    Interdisciplinary Discharge Planning  Primary Care Physician: Fidencio Christopher at [facility]  Lives with - Patient's Self Care Capacity: Adult Children, Spouse  Patient or legal guardian wants to designate a caregiver: No  Support Systems: Children, Family Member(s)  Housing / Facility: 1 Story House  Able to Return to Previous ADL's: Yes  Mobility Issues: Yes  Assistance Needed: Yes  Durable Medical Equipment: Walker    Discharge Preparedness  What is your plan after discharge?: Skilled nursing facility  What are your discharge supports?:  Child, Spouse  Difficulity with ADLs: Walking  Difficulty with ADLs Comment: Depends on the day  Difficulity with IADLs: Cooking  Difficulity with IADL Comments: Depends on the day    Vision / Hearing Impairment  Vision Impairment : Yes  Right Eye Vision: Wears Glasses, Impaired  Left Eye Vision: Wears Glasses, Impaired  Hearing Impairment : No    Domestic Abuse  Have you ever been the victim of abuse or violence?: No  Physical Abuse or Sexual Abuse: No  Verbal Abuse or Emotional Abuse: No  Possible Abuse/Neglect Reported to:: Not Applicable    Discharge Risks or Barriers  Discharge risks or barriers?: Complex medical needs  Patient risk factors: Complex medical needs    Anticipated Discharge Information  Discharge Disposition: D/T to SNF under Medicaid but not cert under Medicare w/planned hosp IP readmit(92

## 2023-08-16 NOTE — ANESTHESIA TIME REPORT
Anesthesia Start and Stop Event Times     Date Time Event    8/16/2023 1427 Ready for Procedure     1427 Anesthesia Start     1446 Anesthesia Stop        Responsible Staff  08/16/23    Name Role Begin End    Kalina Espinoza M.D. Anesth 1427 1440        Overtime Reason:  no overtime (within assigned shift)    Comments:

## 2023-08-16 NOTE — FACE TO FACE
Face to Face Supporting Documentation - Home Health    The encounter with this patient was in whole or in part the primary reason for home health admission.    Date of encounter:   Patient:                    MRN:                       YOB: 2021  Mary Martinez  [id number]  1970     Home health to see patient for:  Skilled Nursing care for assessment, interventions & education, Physical Therapy evaluation and treatment, and Occupational therapy evaluation and treatment    Skilled need for:  New Onset Medical Diagnosis nausea and vomiting    Skilled nursing interventions to include:  Comment: debility    Homebound status evidenced by:  Need the aid of supportive devices such as crutches, canes, wheelchairs or walkers. Leaving home requires a considerable and taxing effort. There is a normal inability to leave the home.    Community Physician to provide follow up care: Fidencio Christopher D.O.     Optional Interventions? No      I certify the face to face encounter for this home health care referral meets the CMS requirements and the encounter/clinical assessment with the patient was, in whole, or in part, for the medical condition(s) listed above, which is the primary reason for home health care. Based on my clinical findings: the service(s) are medically necessary, support the need for home health care, and the homebound criteria are met.  I certify that this patient has had a face to face encounter by myself.  Westley Gooden M.D. - NPI: [id number]

## 2023-08-16 NOTE — CONSULTS
Date of Consultation:  8/16/2023    Patient: : Mary Martinez  MRN: [id number]    Referring Physician:  Dr. Westley Gooden      GI:Blossom Peres M.D.     Reason for Consultation: ?GI bleeding    History of Present Illness:   53 years female with medical history of rheumatoid arthritis, anxiety, asthma, GERD, chronic abdominal pain, refractory nausea and vomiting, low BMI, s/p endoscopy PEG tube with J extension in June 2023 by our GI team presented to the hospital for similar presentation and also released on the patient has dropping hemoglobin and dark stool.    GI team is consulted for possible upper GI scope.    Upper GI scope in June 2023 was for peg tube with J extension, there was no other overt erosion, ulcer or bleeding source during that time.  The patient's last colonoscopy probably about 5 to 10 years ago and that there was no record for a colonoscopy and the patient reported that colonoscopy checked out okay in 2018.    GI team saw the patient at the bedside today, we found some dark material in the PEG tube, the patient had 2 bowel movement today already, which dark.  Hemoglobin drop as mentioned above.  The patient continued to have some nonspecific GI symptoms, change from the last time.  Denies overuse of NSAID.  Deny vomiting blood.  Denies hematochezia.      Past Medical History:   Diagnosis Date    Chronic pain 04/24/2020    Due to RA    Pneumonia 10/2019    Rheumatoid arthritis(714.0) 2003    Acute renal failure (ARF) (HCC)     Allergy     Anemia     Anxiety     Arthritis     Rheumatoid -follows with rheumatologist. Has several fractures due to RA.    ASTHMA     inhalers prn    Blood transfusion without reported diagnosis     Bowel habit changes     4/24/20-constipation and diarrhea.    Bronchitis last approx 2018    Dental disorder     no teeth upper-does not wear her denture. Broken teeth on bottom.    Depression     GERD (gastroesophageal reflux disease)     GIB (gastrointestinal bleeding)      Head ache     Heart burn     taking famotidine    Hiatus hernia syndrome     History of pancreatitis     Hypokalemia     Hyponatremia     Idiopathic chronic pancreatitis (HCC)     Indigestion     taking famotidine    Intractable nausea and vomiting     Kidney disease     Pain     CPS-everywhere    PONV (postoperative nausea and vomiting)     Psychiatric problem     anxiety and depression    Rheumatoid aortitis     SMAS (superior mesenteric artery syndrome) (HCC)     Substance abuse (HCC)          Past Surgical History:   Procedure Laterality Date    FL PLACE PERCUT GASTROSTOMY TUBE N/A 6/12/2023    Procedure: INSERTION, PEG TUBE - PEG AND J TUBE PLACEMENT;  Surgeon: Marilyn Anderson M.D.;  Location: SURGERY SAME DAY [facility];  Service: Gastroenterology    FL UPPER GI ENDOSCOPY,DIAGNOSIS  6/12/2023    Procedure: GASTROSCOPY;  Surgeon: Marilyn Anderson M.D.;  Location: SURGERY SAME DAY [facility];  Service: Gastroenterology    FL UPPER GI ENDOSCOPY,DIAGNOSIS N/A 9/9/2022    Procedure: ENDOSCOPIC ULTRASOUND- UPPER;  Surgeon: Jorge Brooke M.D.;  Location: [facility];  Service: EUS    EGD W/ENDOSCOPIC ULTRASOUND N/A 9/9/2022    Procedure: EGD, WITH ENDOSCOPIC US;  Surgeon: oJrge Brooke M.D.;  Location: [facility];  Service: EUS    FL ENDOSCOPIC US EXAM, ESOPH  4/29/2020    Procedure: EGD, WITH ENDOSCOPIC US;  Surgeon: Jorge Brooke M.D.;  Location: [facility];  Service: Gastroenterology    GASTROSCOPY-ENDO  4/29/2020    Procedure: GASTROSCOPY;  Surgeon: Jorge Brooke M.D.;  Location: [facility];  Service: Gastroenterology    EGD WITH ASP/BX  4/29/2020    Procedure: EGD, WITH ASPIRATION BIOPSY - POSS FNA;  Surgeon: Jorge Brooke M.D.;  Location: [facility];  Service: Gastroenterology    FL EXPLORATORY OF ABDOMEN N/A 10/4/2019    Procedure: LAPAROTOMY, EXPLORATORY AND REPAIR OF DUODENAL PERFORATION;  Surgeon:  James Dumont M.D.;  Location: [facility];  Service: General    COLONOSCOPY  2018    with upper endoscopy    FINGER ARTHROPLASTY Right 6/5/2017    Procedure: FINGER ARTHROPLASTY - LONG, RING AND SMALL VOLAR PLATE;  Surgeon: Lobo Rosen M.D.;  Location: SURGERY SAME DAY [facility];  Service:     FINGER AMPUTATION  6/5/2017    Procedure: FINGER AMPUTATION - LONG, RING AND SMALL AT THE PROXIMAL INTERPHALANGEAL JOINT;  Surgeon: Lobo Rosen M.D.;  Location: SURGERY SAME DAY [facility];  Service:     FINGER ARTHROPLASTY Right 4/17/2017    Procedure: FINGER ARTHROPLASTY ;  Surgeon: Lobo Rosen M.D.;  Location: SURGERY SAME DAY [facility];  Service:     FINGER AMPUTATION Right 9/14/2016    Procedure: FINGER AMPUTATION INDEX;  Surgeon: Lobo Rosen M.D.;  Location: SURGERY SAME DAY [facility];  Service:     IRRIGATION & DEBRIDEMENT ORTHO Right 9/11/2016    Procedure: IRRIGATION & DEBRIDEMENT ORTHO - right index finger;  Surgeon: Madhu Rosen M.D.;  Location: SURGERY [facility];  Service:     FUSION, PIP JOINT, TOE Right 8/29/2016    Procedure: RE-DO INDEX FINGER PROXIMAL INTERPHALANGEAL ARTHRODESIS;  Surgeon: Lobo Rosen M.D.;  Location: SURGERY SAME DAY [facility];  Service:     BONE GRAFT Right 8/29/2016    Procedure: BONE GRAFT - DISTAL RADIUS ;  Surgeon: Lobo Rosen M.D.;  Location: SURGERY SAME DAY [facility];  Service:     ARTHRODESIS Right 5/6/2016    Procedure: ARTHRODESIS INDEX FINGER PROXIMAL INTERPHALANGEAL;  Surgeon: Lobo Rosen M.D.;  Location: SURGERY SAME DAY [facility];  Service:     FOOT RECONSTRUCTION RHEUMATIC Left 7/29/2015    Procedure: FOOT RECONSTRUCTION RHEUMATIC;  Surgeon: Heriberto Alves M.D.;  Location: SURGERY [facility];  Service:     TOE FUSION Right 5/27/2015    Procedure: TOE FUSION 1ST METATARSALPHALANGEAL JOINT;  Surgeon: Heriberto Alves M.D.;  Location: [facility];   Service:     BONE SPUR EXCISION  5/27/2015    Procedure: BONE SPUR EXCISION METATARSAL HEAD 2-3;  Surgeon: Heriberto Alves M.D.;  Location: SURGERY [facility];  Service:     HAMMERTOE CORRECTION  5/27/2015    Procedure: HAMMERTOE CORRECTION AND SOFT TISSUE RE-ALINGMENT 2-3 ;  Surgeon: Heriberto Alves M.D.;  Location: SURGERY [facility];  Service:     DENTAL EXTRACTION(S)  2014    all of upper teeth    ABDOMINAL ABSCESS DRAINAGE  9/27/2011    Performed by VERO WRIGHT at SURGERY [facility]    EMANUEL BY LAPAROSCOPY  9/27/2011    Performed by VERO WRIGHT at SURGERY [facility]    APPENDECTOMY  2011    Found out it had ruptured prior to abcess surgery    REPEAT C SECTION  2008    REPEAT C SECTION  2005    REPEAT C SECTION  1999    PRIMARY C SECTION  1991    TONSILLECTOMY  1982    WRIST EXPLORATION Left 1980's    fractured wrist-no hardware     Constitutional: Denies fevers.  Eyes: Denies symptoms.   Ears/Nose/Throat/Mouth: Denies choking.  Cardiovascular: Denies chest pain.   Respiratory: Denies shortness of breath.  Gastrointestinal/Hepatic: Per H/P.   Skin/Breast: Denies rash   Psychiatric: No complaints    HEENT: grossly normal.  Cardiovascular: Normal heart rate.  Lungs: Respiratory effort is normal.   Abdomen: Soft, No tenderness. PEG tube in place, some dark material in the tube.   Skin: No erythema, No rash.  Lower limbs: normal, no pitting edema.   Neurologic: Alert & oriented x 3, Normal motor function, No focal deficits noted.  PSY: stable mood.       Family History   Problem Relation Age of Onset    Cancer Mother     Heart Disease Mother     Hypertension Mother     Heart Disease Father     Hypertension Father        Social History     Socioeconomic History    Marital status:      Spouse name: Ousmane    Number of children: 5   Tobacco Use    Smoking status: Every Day     Packs/day: 0.50     Years: 30.00     Additional pack years: 0.00     Total pack years: 15.00     Types:  Cigarettes    Smokeless tobacco: Never   Vaping Use    Vaping Use: Never used   Substance and Sexual Activity    Alcohol use: Never    Drug use: Never    Sexual activity: Not Currently   Other Topics Concern     Service No    Blood Transfusions Yes    Caffeine Concern No    Occupational Exposure No    Hobby Hazards No    Sleep Concern No    Stress Concern Yes    Weight Concern No    Special Diet No    Back Care No    Exercise Yes    Bike Helmet Yes    Seat Belt Yes    Self-Exams Yes   Social History Narrative    ** Merged History Encounter **          Social Determinants of Health     Financial Resource Strain: Low Risk  (9/2/2022)    Overall Financial Resource Strain (CARDIA)     Difficulty of Paying Living Expenses: Not hard at all   Food Insecurity: No Food Insecurity (9/2/2022)    Hunger Vital Sign     Worried About Running Out of Food in the Last Year: Never true     Ran Out of Food in the Last Year: Never true   Transportation Needs: No Transportation Needs (9/2/2022)    PRAPARE - Transportation     Lack of Transportation (Medical): No     Lack of Transportation (Non-Medical): No           Physical Exam:  Vitals:    08/15/23 2003 08/16/23 0010 08/16/23 0321 08/16/23 0852   BP: (!) 87/54 (!) 80/58 (!) 80/53 98/65   Pulse: 85 85 91 96   Resp: 16 16 16 16   Temp:    36.9 °C (98.4 °F)   TempSrc: Temporal Temporal Temporal Temporal   SpO2: 95% 93% 93% 94%   Weight:       Height:                 Labs:  Recent Labs     08/14/23  1643 08/15/23  0200 08/15/23  0710 08/15/23  2139 08/16/23  0432   WBC 10.8 10.6  --   --  7.1   RBC 3.26* 2.97*  --   --  2.38*   HEMOGLOBIN 10.4* 9.5* 9.5* 8.3* 7.5*   HEMATOCRIT 31.6* 28.8*  --   --  23.1*   MCV 96.9 97.0  --   --  97.1   MCH 31.9 32.0  --   --  31.5   MCHC 32.9 33.0  --   --  32.5   RDW 50.7* 50.6*  --   --  50.8*   PLATELETCT 435 333  --   --  315   MPV 9.9 9.7  --   --  9.7     Recent Labs     08/15/23  0014 08/15/23  0849 08/16/23  0432   SODIUM 133* 134* 138    POTASSIUM 2.8* 3.3* 3.3*   CHLORIDE 109 108 112   CO2 11* 11* 14*   GLUCOSE 96 97 79   BUN 24* 19 10           Recent Labs     08/14/23  1643 08/15/23  0014 08/16/23  0432   ASTSGOT 12 26 39   ALTSGPT <5 14 29   TBILIRUBIN 0.2 <0.2 <0.2   ALKPHOSPHAT 120* 84 102*   GLOBULIN 3.3 2.6 2.5         Imaging:  IR-US GUIDED PIV  Narrative: EXAMINATION:                                                                          HISTORY/REASON FOR EXAM:  Ultrasound Guided PIV.     TECHNIQUE/EXAM DESCRIPTION AND NUMBER OF VIEWS:  Peripheral IV insertion   with ultrasound guidance.  The procedure was prepared using maximal   sterile barrier technique including sterile gown, mask, cap, and donning   of sterile gloves following appropriate hand hygiene and/or sterile scrub.   Patient skin site was prepped with 2% Chlorhexidine solution.       FINDINGS: Peripheral IV insertion with Ultrasound Guidance was performed   by qualified imaging nursing staff without the assistance of a   Radiologist.  Impression:  Ultrasound-guided PERIPHERAL IV INSERTION performed by   qualified nursing staff as above.  [id number]  Narrative: 8/15/2023 10:43 PM    HISTORY/REASON FOR EXAM:  Abdominal pain, dilated common bile duct seen on the recent CT scan    TECHNIQUE/EXAM DESCRIPTION: Magnetic resonance cholangiopancreatography.    Magnetic resonance cholangiopancreatography was performed on with axial, coronal, and sagittal thin and thick section heavily T2-weighted sequences.    3-D cholangiographic multiplanar maximum intensity projection (MIP) images were created on a workstation.    The study was performed on a Speedshape Signa 1.5 Tawny MRI scanner.    COMPARISON: 8/14/2023.    FINDINGS:    Lower chest: Lung bases are clear.    Liver: Normal hepatic contour. No mass. Minimal postcholecystectomy intrahepatic biliary dilatation.    Gallbladder: Cholecystectomy.    Common bile duct: Measures 1 cm. Smooth tapering distally. No choledocholithiasis. Flow  void in the CBD on several sequences.    Pancreas: Unremarkable.    Spleen: No mass.    Adrenals: No mass.    Kidneys: No suspicious mass lesions. No hydronephrosis. Renal stones were seen on CT.    Stomach, small bowel, colon: Gastrojejunostomy. No bowel wall thickening or obstruction.    Peritoneal cavity: No ascites.    Lymph nodes: No enlarged nodes by size criteria.    Aorta: No aneurysm.    Musculoskeletal structures: Preserved bone marrow signal.  Impression: 1.  No choledocholithiasis.  2.  Prior cholecystectomy.            Impressions:  53 years female with medical history of rheumatoid arthritis, anxiety, asthma, GERD, chronic abdominal pain, refractory nausea and vomiting, low BMI, s/p endoscopy PEG tube with J extension in June 2023 by our GI team presented to the hospital for similar presentation and also dropping hemoglobin and dark stool.    GI team is consulted for GI bleeding.    Based on presentation, bowel movement, dark material in the PEG tube, upper GI bleeding is more likely.  The patient can have some erosion, ulcer from the PEG tube, other peptic disease also possible.  Vascular lesion cannot be ruled out at this time.  Cancer is less likely.    So far no strong evidence of lower GI bleeding, we would not offer colonoscopy at this time.    Please continue n.p.o., PPI, other supportive care, transfusion per protocol, GI team will try to add patient on for EGD today.    Diagnosis: upper gastrointestinal bleeding.   Procedure: Esophagogastroduodenoscopy with bleeding control including banding.       This note was generated using voice recognition software which has a small chance of producing errors of grammar and possibly content. I have made every reasonable attempt to find and correct any obvious errors, but expect that some may not be found prior to finalization of this note.

## 2023-08-16 NOTE — ANESTHESIA POSTPROCEDURE EVALUATION
Patient: Mary Martinez    Procedure Summary     Date: 08/16/23 Room / Location: [facility] ROOM 26 / SURGERY SAME DAY [facility]    Anesthesia Start: 1427 Anesthesia Stop: 1446    Procedure: GASTROSCOPY (Esophagus) Diagnosis: (GERD, gastric erosions)    Surgeons: Blossom Peres M.D. Responsible Provider: Kalina Espinoza M.D.    Anesthesia Type: general ASA Status: 3          Final Anesthesia Type: general  Last vitals  BP   Blood Pressure: (!) 86/52    Temp   37.1 °C (98.7 °F)    Pulse   77   Resp   14    SpO2   100 %      Anesthesia Post Evaluation    Patient location during evaluation: PACU  Patient participation: complete - patient participated  Level of consciousness: awake and alert  Pain score: 0    Airway patency: patent  Anesthetic complications: no  Cardiovascular status: adequate and hemodynamically stable  Respiratory status: acceptable and room air  Hydration status: acceptable    PONV: none          No notable events documented.     Nurse Pain Score: 9 (NPRS)

## 2023-08-16 NOTE — PROCEDURES
OPERATIVE REPORT    PATIENT:   Mary Martinez   1970       PREOPERATIVE DIAGNOSES/INDICATIONS: Dark stool, r/o GI bleeding.     POSTOPERATIVE DIAGNOSIS:   GERD grade A-B.   Mild erosion around the feeding tube in stomach.   First portion of duodenum has no bleeding, did not check 2nd portion well due to the J extension in place.     PROCEDURE:  ESOPHAGOGASTRODUODENOSCOPY    PHYSICIAN:  Blossom Peres MD. MPH.    ANESTHESIA:  Per anesthesiologist or ICU team.     LOCATION: [facility]    CONSENT:  OBTAINED. The risks, benefits and alternatives of the procedure were discussed in details. The risks include and are not limited to bleeding, infection, perforation, missed lesions, and sedations risks (cardiopulmonary compromise and allergic reaction to medications).    DESCRIPTION: The patient presented to the procedure room.  After routine checkup was performed, patient was brought into the endoscopy suite.  Patient was placed on his left lateral decubitus position. A bite block was placed in patient's mouth. Patient was sedated by anesthesia.  Vital signs were monitored throughout the procedure.  Oxygenation support was provided throughout the procedure. Upper endoscope was inserted into patient's mouth and advanced to the second portion of the duodenum under direct visualization.      Once the site was reached and examined, the upper endoscope was withdrawn.  Retroflexion was made within the stomach.  The stomach was decompressed, scope was withdrawn and the procedure was terminated.    The patient tolerated the procedure well.  There were no immediate complications.    OPERATIVE FINDINGS:  GERD grade A-B.   Mild erosion around the feeding tube in stomach.   First portion of duodenum has not bleeding, did not change 2nd portion well due to the J extension in place.     RECOMMENDATIONS:  No overt evidence of upper GI bleeding.   Continue PPI, tube feed or iv bid dose for now, keep monitoring.   Okay to resume  feeding/diet.   Inpatient GI team will continue to follow up.       This note has been transcribed with digital voice recognition software and although it has been reviewed may contain grammatical or word errors

## 2023-08-16 NOTE — PROGRESS NOTES
Bedside report received from night RN, pt care assumed, assessment completed. Pt is A&O4, pain 3, nsr on the monitor. Updated on POC, questions answered. Bed in lowest, locked position, treaded socks on, call light and belongings within reach.

## 2023-08-17 ENCOUNTER — PHARMACY VISIT (OUTPATIENT)
Dept: PHARMACY | Facility: MEDICAL CENTER | Age: 53
End: 2023-08-17
Payer: COMMERCIAL

## 2023-08-17 VITALS
HEART RATE: 68 BPM | HEIGHT: 63 IN | SYSTOLIC BLOOD PRESSURE: 91 MMHG | DIASTOLIC BLOOD PRESSURE: 61 MMHG | OXYGEN SATURATION: 93 % | RESPIRATION RATE: 17 BRPM | BODY MASS INDEX: 15.31 KG/M2 | TEMPERATURE: 97.2 F | WEIGHT: 86.42 LBS

## 2023-08-17 LAB
ANION GAP SERPL CALC-SCNC: 13 MMOL/L (ref 7–16)
BUN SERPL-MCNC: 6 MG/DL (ref 8–22)
CALCIUM SERPL-MCNC: 8.3 MG/DL (ref 8.5–10.5)
CHLORIDE SERPL-SCNC: 107 MMOL/L (ref 96–112)
CO2 SERPL-SCNC: 14 MMOL/L (ref 20–33)
CREAT SERPL-MCNC: 0.61 MG/DL (ref 0.5–1.4)
ERYTHROCYTE [DISTWIDTH] IN BLOOD BY AUTOMATED COUNT: 51.1 FL (ref 35.9–50)
GFR SERPLBLD CREATININE-BSD FMLA CKD-EPI: 108 ML/MIN/1.73 M 2
GLUCOSE SERPL-MCNC: 72 MG/DL (ref 65–99)
HCT VFR BLD AUTO: 26.5 % (ref 37–47)
HGB BLD-MCNC: 8.5 G/DL (ref 12–16)
MAGNESIUM SERPL-MCNC: 2 MG/DL (ref 1.5–2.5)
MCH RBC QN AUTO: 31.3 PG (ref 27–33)
MCHC RBC AUTO-ENTMCNC: 32.1 G/DL (ref 32.2–35.5)
MCV RBC AUTO: 97.4 FL (ref 81.4–97.8)
PHOSPHATE SERPL-MCNC: 2.6 MG/DL (ref 2.5–4.5)
PLATELET # BLD AUTO: 349 K/UL (ref 164–446)
PMV BLD AUTO: 9.9 FL (ref 9–12.9)
POTASSIUM SERPL-SCNC: 3.2 MMOL/L (ref 3.6–5.5)
RBC # BLD AUTO: 2.72 M/UL (ref 4.2–5.4)
SODIUM SERPL-SCNC: 134 MMOL/L (ref 135–145)
WBC # BLD AUTO: 6.1 K/UL (ref 4.8–10.8)

## 2023-08-17 PROCEDURE — 99239 HOSP IP/OBS DSCHRG MGMT >30: CPT | Performed by: STUDENT IN AN ORGANIZED HEALTH CARE EDUCATION/TRAINING PROGRAM

## 2023-08-17 PROCEDURE — 83735 ASSAY OF MAGNESIUM: CPT

## 2023-08-17 PROCEDURE — 700105 HCHG RX REV CODE 258: Performed by: STUDENT IN AN ORGANIZED HEALTH CARE EDUCATION/TRAINING PROGRAM

## 2023-08-17 PROCEDURE — A9270 NON-COVERED ITEM OR SERVICE: HCPCS | Performed by: STUDENT IN AN ORGANIZED HEALTH CARE EDUCATION/TRAINING PROGRAM

## 2023-08-17 PROCEDURE — 80048 BASIC METABOLIC PNL TOTAL CA: CPT

## 2023-08-17 PROCEDURE — 700102 HCHG RX REV CODE 250 W/ 637 OVERRIDE(OP): Performed by: STUDENT IN AN ORGANIZED HEALTH CARE EDUCATION/TRAINING PROGRAM

## 2023-08-17 PROCEDURE — RXMED WILLOW AMBULATORY MEDICATION CHARGE: Performed by: STUDENT IN AN ORGANIZED HEALTH CARE EDUCATION/TRAINING PROGRAM

## 2023-08-17 PROCEDURE — C9113 INJ PANTOPRAZOLE SODIUM, VIA: HCPCS | Performed by: STUDENT IN AN ORGANIZED HEALTH CARE EDUCATION/TRAINING PROGRAM

## 2023-08-17 PROCEDURE — 85027 COMPLETE CBC AUTOMATED: CPT

## 2023-08-17 PROCEDURE — 700111 HCHG RX REV CODE 636 W/ 250 OVERRIDE (IP): Mod: JZ | Performed by: STUDENT IN AN ORGANIZED HEALTH CARE EDUCATION/TRAINING PROGRAM

## 2023-08-17 PROCEDURE — 99232 SBSQ HOSP IP/OBS MODERATE 35: CPT | Performed by: NURSE PRACTITIONER

## 2023-08-17 PROCEDURE — 84100 ASSAY OF PHOSPHORUS: CPT

## 2023-08-17 PROCEDURE — 700111 HCHG RX REV CODE 636 W/ 250 OVERRIDE (IP): Performed by: STUDENT IN AN ORGANIZED HEALTH CARE EDUCATION/TRAINING PROGRAM

## 2023-08-17 RX ORDER — LANOLIN ALCOHOL/MO/W.PET/CERES
100 CREAM (GRAM) TOPICAL DAILY
Qty: 90 TABLET | Refills: 0 | Status: SHIPPED | OUTPATIENT
Start: 2023-08-17 | End: 2023-09-20

## 2023-08-17 RX ORDER — FERROUS SULFATE 325(65) MG
325 TABLET ORAL DAILY
Qty: 30 TABLET | Refills: 0 | Status: SHIPPED | OUTPATIENT
Start: 2023-08-17 | End: 2023-09-20

## 2023-08-17 RX ORDER — ESOMEPRAZOLE MAGNESIUM 40 MG/1
40 CAPSULE, DELAYED RELEASE ORAL DAILY
Qty: 60 CAPSULE | Refills: 0 | Status: SHIPPED | OUTPATIENT
Start: 2023-08-17 | End: 2023-10-03

## 2023-08-17 RX ORDER — POTASSIUM CHLORIDE 20 MEQ/1
40 TABLET, EXTENDED RELEASE ORAL ONCE
Status: COMPLETED | OUTPATIENT
Start: 2023-08-17 | End: 2023-08-17

## 2023-08-17 RX ADMIN — OXYCODONE HYDROCHLORIDE 7.5 MG: 15 TABLET ORAL at 04:47

## 2023-08-17 RX ADMIN — SODIUM CHLORIDE 250 MG: 9 INJECTION, SOLUTION INTRAVENOUS at 04:54

## 2023-08-17 RX ADMIN — POTASSIUM CHLORIDE 40 MEQ: 1500 TABLET, EXTENDED RELEASE ORAL at 08:39

## 2023-08-17 RX ADMIN — PANTOPRAZOLE SODIUM 40 MG: 40 INJECTION, POWDER, FOR SOLUTION INTRAVENOUS at 04:49

## 2023-08-17 RX ADMIN — Medication 100 MG: at 04:49

## 2023-08-17 ASSESSMENT — ENCOUNTER SYMPTOMS
BLOOD IN STOOL: 0
SHORTNESS OF BREATH: 0
DEPRESSION: 0
HEARTBURN: 0
DIZZINESS: 0
BACK PAIN: 0
FEVER: 0
CHILLS: 0
DIARRHEA: 0
BLURRED VISION: 0
COUGH: 0
ABDOMINAL PAIN: 0
NAUSEA: 0
CONSTIPATION: 0
WEAKNESS: 0
VOMITING: 0

## 2023-08-17 ASSESSMENT — COGNITIVE AND FUNCTIONAL STATUS - GENERAL
MOVING FROM LYING ON BACK TO SITTING ON SIDE OF FLAT BED: A LOT
EATING MEALS: A LITTLE
DRESSING REGULAR LOWER BODY CLOTHING: A LOT
TOILETING: A LOT
DRESSING REGULAR UPPER BODY CLOTHING: A LOT
STANDING UP FROM CHAIR USING ARMS: A LOT
DAILY ACTIVITIY SCORE: 14
MOVING TO AND FROM BED TO CHAIR: A LOT
CLIMB 3 TO 5 STEPS WITH RAILING: TOTAL
TURNING FROM BACK TO SIDE WHILE IN FLAT BAD: A LITTLE
WALKING IN HOSPITAL ROOM: A LOT
SUGGESTED CMS G CODE MODIFIER DAILY ACTIVITY: CK
HELP NEEDED FOR BATHING: A LOT
MOBILITY SCORE: 12
SUGGESTED CMS G CODE MODIFIER MOBILITY: CL
PERSONAL GROOMING: A LITTLE

## 2023-08-17 NOTE — PROGRESS NOTES
Patient was cleared for discharge after GI cleared her and PT/OT evaluation. GI at bedside and cleared her for discharge and patient is not waiting for PT and OT evaluation, it was explained to her about the need to do an eval prior to discharge as per Hospital[facility] but she is adamant to leave right away.

## 2023-08-17 NOTE — CARE PLAN
The patient is Watcher - Medium risk of patient condition declining or worsening    Shift Goals  Clinical Goals: Monitor Vitals, BP, HR/rhythm, pain management, nutrition/diet, wound consult, rest  Patient Goals: no pain, rest    Progress made toward(s) clinical / shift goals:    Problem: Pain - Standard  Goal: Alleviation of pain or a reduction in pain to the patient’s comfort goal  Description: Target End Date:  Prior to discharge or change in level of care    Document on Vitals flowsheet    1.  Document pain using the appropriate pain scale per order or unit policy  2.  Educate and implement non-pharmacologic comfort measures (i.e. relaxation, distraction, massage, cold/heat therapy, etc.)  3.  Pain management medications as ordered  4.  Reassess pain after pain med administration per policy  5.  If opiods administered assess patient's response to pain medication is appropriate per POSS sedation scale  6.  Follow pain management plan developed in collaboration with patient and interdisciplinary team (including palliative care or pain specialists if applicable)  Outcome: Progressing     Problem: Knowledge Deficit - Standard  Goal: Patient and family/care givers will demonstrate understanding of plan of care, disease process/condition, diagnostic tests and medications  Description: Target End Date:  1-3 days or as soon as patient condition allows    Document in Patient Education    1.  Patient and family/caregiver oriented to unit, equipment, visitation policy and means for communicating concern  2.  Complete/review Learning Assessment  3.  Assess knowledge level of disease process/condition, treatment plan, diagnostic tests and medications  4.  Explain disease process/condition, treatment plan, diagnostic tests and medications  Outcome: Progressing     Problem: Skin Integrity  Goal: Skin integrity is maintained or improved  Description: Target End Date:  Prior to discharge or change in level of care    Document  interventions on Skin Risk/Zan flowsheet groups and corresponding LDA    1.  Assess and monitor skin integrity, appearance and/or temperature  2.  Assess risk factors for impaired skin integrity and/or pressures ulcers  3.  Implement precautions to protect skin integrity in collaboration with interdisciplinary team  4.  Implement pressure ulcer prevention protocol if at risk for skin breakdown  5.  Confirm wound care consult if at risk for skin breakdown  6.  Ensure patient use of pressure relieving devices  (Low air loss bed, waffle overlay, heel protectors, ROHO cushion, etc)  Outcome: Progressing     Problem: Fall Risk  Goal: Patient will remain free from falls  Description: Target End Date:  Prior to discharge or change in level of care    Document interventions on the [facility] Fall Risk Assessment    1.  Assess for fall risk factors  2.  Implement fall precautions  Outcome: Progressing     Problem: Depression  Goal: Patient and family/caregiver will verbalize accurate information about at least two of the possible causes of depression, three-four of the signs and symptoms of depression  Description: Target End Date:  1 to 3 days    1.  Assess the patient's and family/caregiver's knowledge regarding depression and its causes.  2.  Explain to the patient and family/caregiver regarding the major symptoms of depression.  3.  Inform the patient and family/caregiver that depression can be treated through medications and psychotherapy.  4.  Allow the patient to express feelings and perceptions  5.  Express hope to the patient with realistic comments about the patient's strengths and resources.  5.  Give positive feedback after a tasks are achieved.  6.  Encourage identification of positive aspects of self.  7.  Educate the patient about crisis intervention services such as suicide hotlines and other resources.  Outcome: Progressing       Patient is not progressing towards the following goals:

## 2023-08-17 NOTE — DISCHARGE SUMMARY
Discharge Summary    CHIEF COMPLAINT ON ADMISSION  Chief Complaint   Patient presents with    Abdominal Pain     Pt BIB EMS from home with complaints of abd pain centrally located. Pt had G tube placed in July ans has had pain off and on since then. Tried to give herself a boost this morning and had immediate serious pain so called EMS.     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   Abdominal/pelvic CT without contrast showing Punctate hyperdensity in the distal CBD, which is dilated up to 1 cm which is concerning for choledocholithiasis. MRCP was ordered which showed unremarkable, No choledocholithiasis.   Patient was noted acute on chronic anemia with melena. GI was consulted. She underwent EGD 8/16 showing GERD grade A-B. Mild erosion around the feeding tube in stomach. First portion of duodenum has no bleeding, did not check 2nd portion well due to the J extension in place. She is recommended to take PPI bid. Iron profile c/w JOO and she was started with iron supplements.   On the day of discharge, patient's hemoglobin stable, she is tolerating diet. Electrolytes were replaced.      Therefore, she is discharged in good and stable condition to home with organized home healthcare and close outpatient follow-up.    The patient met 2-midnight criteria for an inpatient stay at the time of discharge.    Discharge Date  8/17/23    FOLLOW UP ITEMS  POST DISCHARGE  PCP  GI    DISCHARGE DIAGNOSES  Principal Problem:    Hypokalemia (POA: Yes)  Active Problems:    High anion gap metabolic acidosis (POA: Yes)    Hypophosphatemia (POA: Yes)    DNR (do not resuscitate) (POA: Yes)    Severe protein-calorie malnutrition (HCC) (POA: Yes)    Starvation ketoacidosis (POA: Yes)    Bipolar 1 disorder (HCC) (POA: Yes)    Normocytic anemia (POA: Yes)    Common bile duct dilation (POA: Yes)  Resolved Problems:    * No resolved hospital problems. *      FOLLOW UP  Future Appointments   Date Time Provider Department Center   9/1/2023  1:00 PM Sunitha Stahl M.D. [facility] Sonja Christopher D.O.  [address] [facility] [address] [address]  [facility] [address]  [phone]    Follow up in 1 week(s)        MEDICATIONS ON DISCHARGE     Medication List        START taking these medications        Instructions   FeroSul 325 (65 Fe) MG tablet  Generic drug: ferrous sulfate   Take 1 Tablet by mouth every day.  Dose: 325 mg            CHANGE how you take these medications        Instructions   esomeprazole 40 MG delayed-release capsule  What changed:   medication strength  how much to take  when to take this  Commonly known as: NexIUM   Take 1 Capsule by mouth daily for 30 days.  Dose: 40 mg     oxyCODONE-acetaminophen 7.5-325 MG per tablet  Start taking on: August 26, 2023  What changed: Another medication with the same name was removed. Continue taking this medication, and follow the directions you see here.  Commonly known as: Percocet   Take 1 Tablet by mouth every 6 hours as needed for Severe Pain for up to 30 days.  Dose: 1 Tablet            CONTINUE taking these medications        Instructions   midodrine 10 MG tablet  Commonly known as: Proamatine   Take 1 Tablet by mouth 3 times a day.  Dose: 1 Tablet     Naloxone 4 MG/0.1ML Liqd  Commonly known as: Narcan   One spray in one nostril for overdose and call 911.     PARoxetine 30 MG Tabs  Commonly known as: Paxil   Take 60 mg by mouth  every evening. 60 mg = 2 tablets  Dose: 60 mg     QUEtiapine 100 MG Tabs  Commonly known as: SEROquel   Take 1 Tablet by mouth every evening.  Dose: 100 mg     thiamine 100 MG tablet  Commonly known as: Thiamine   Take 1 Tablet by mouth every day for 90 days.  Dose: 100 mg              Allergies  Allergies   Allergen Reactions    Penicillins Anaphylaxis and Hives     Tolerates cephalosporins; reports throat swelling with PCN    Aripiprazole Nausea     Spasms, shaking      Nitrous Oxide Vomiting       DIET  Orders Placed This Encounter   Procedures    Diet Order Diet: Level 6 - Soft and Bite Sized; Liquid level: Level 0 - Thin     Standing Status:   Standing     Number of Occurrences:   1     Order Specific Question:   Diet:     Answer:   Level 6 - Soft and Bite Sized [23]     Order Specific Question:   Liquid level     Answer:   Level 0 - Thin       ACTIVITY  As tolerated.  Weight bearing as tolerated    CONSULTATIONS  GI    PROCEDURES  S/p EGD 8/16/23    LABORATORY  Lab Results   Component Value Date    SODIUM 134 (L) 08/17/2023    POTASSIUM 3.2 (L) 08/17/2023    CHLORIDE 107 08/17/2023    CO2 14 (L) 08/17/2023    GLUCOSE 72 08/17/2023    BUN 6 (L) 08/17/2023    CREATININE 0.61 08/17/2023    CREATININE 0.7 11/29/2008        Lab Results   Component Value Date    WBC 6.1 08/17/2023    HEMOGLOBIN 8.5 (L) 08/17/2023    HEMATOCRIT 26.5 (L) 08/17/2023    PLATELETCT 349 08/17/2023      IR-US GUIDED PIV   Final Result    Ultrasound-guided PERIPHERAL IV INSERTION performed by    qualified nursing staff as above.      [id number]   Final Result      1.  No choledocholithiasis.   2.  Prior cholecystectomy.         CT-ABDOMEN-PELVIS W/O   Final Result      1.  Punctate hyperdensity in the distal CBD, which is dilated up to 1 cm. This may represent choledocholithiasis. Consider MRCP.   2.  Air-fluid levels in the colon, suggestive of diarrhea.   3.  Bilateral medullary nephrocalcinosis and bilateral nonobstructing renal  stones.      DX-CHEST-PORTABLE (1 VIEW)   Final Result      No acute cardiopulmonary abnormality.               Total time of the discharge process 36 minutes.

## 2023-08-17 NOTE — DISCHARGE PLANNING
Received Choice form at 0880  Agency/Facility Name: [facility]  Referral sent per Choice form @ [phone]  Agency[facility]Facility Name: [facility]  Spoke To: Chel  Outcome: [facility] confirmed they have accepted Pt.

## 2023-08-17 NOTE — DISCHARGE INSTRUCTIONS
Discharge Instructions per Westley Gooden M.D.    Please follow-up with PCP as outpatient.  Take medications as prescribed    Return to ER in the event of new or worsening symptoms. Please note importance of compliance and the patient has agreed to proceed with all medical recommendations and follow up plan indicated above. All medications come with benefits and risks. Risks may include permanent injury or death and these risks can be minimized with close reassessment and monitoring. Please make it to your scheduled follow ups with PCP and GI      Discharge Instructions    Discharged to home by car with relative. Discharged via wheelchair, hospital escort: Yes.  Special equipment needed: Not Applicable    Be sure to schedule a follow-up appointment with your primary care doctor or any specialists as instructed.     Discharge Plan:        I understand that a diet low in cholesterol, fat, and sodium is recommended for good health. Unless I have been given specific instructions below for another diet, I accept this instruction as my diet prescription.   Other diet: soft bite sized foods as tolerated     Special Instructions: None    -Is this patient being discharged with medication to prevent blood clots?  No    Is patient discharged on Warfarin / Coumadin?   No     Nausea and Vomiting, Adult  Nausea is feeling that you have an upset stomach and that you are about to vomit. Vomiting is when food in your stomach forcefully comes out of your mouth. Vomiting can make you feel weak. If you vomit, or if you are not able to drink enough fluids, you may not have enough water in your body (get dehydrated). If you do not have enough water in your body, you may:  Feel tired.  Feel thirsty.  Have a dry mouth.  Have cracked lips.  Pee (urinate) less often.  Older adults and people with other diseases or a weak body defense system (immune system) are at higher risk for not having enough water in the body. If you feel like you may  vomit or you vomit, it is important to follow instructions from your doctor about how to take care of yourself.  Follow these instructions at home:  Watch your symptoms for any changes. Tell your doctor about them.  Eating and drinking         Take an ORS (oral rehydration solution). This is a drink that is sold at pharmacies and stores.  Drink clear fluids in small amounts as you are able, such as:  Water.  Ice chips.  Fruit juice that has water added (diluted fruit juice).  Low-calorie sports drinks.  Eat bland, easy-to-digest foods in small amounts as you are able, such as:  Bananas.  Applesauce.  Rice.  Low-fat (lean) meats.  Toast.  Crackers.  Avoid drinking fluids that have a lot of sugar or caffeine in them. This includes energy drinks, sports drinks, and soda.  Avoid alcohol.  Avoid spicy or fatty foods.  General instructions  Take over-the-counter and prescription medicines only as told by your doctor.  Drink enough fluid to keep your pee (urine) pale yellow.  Wash your hands often with soap and water for at least 20 seconds. If you cannot use soap and water, use hand .  Make sure that everyone in your home washes their hands well and often.  Rest at home until you feel better.  Watch your condition for any changes.  Take slow and deep breaths when you feel like you may vomit.  Keep all follow-up visits.  Contact a doctor if:  Your symptoms get worse.  You have new symptoms.  You have a fever.  You cannot drink fluids without vomiting.  You feel like you may vomit for more than 2 days.  You feel light-headed or dizzy.  You have a headache.  You have muscle cramps.  You have a rash.  You have pain while peeing.  Get help right away if:  You have pain in your chest, neck, arm, or jaw.  You feel very weak or you faint.  You vomit again and again.  You have vomit that is bright red or looks like black coffee grounds.  You have bloody or black poop (stools) or poop that looks like tar.  You have a very  bad headache, a stiff neck, or both.  You have very bad pain, cramping, or bloating in your belly (abdomen).  You have trouble breathing.  You are breathing very quickly.  Your heart is beating very quickly.  Your skin feels cold and clammy.  You feel confused.  You have signs of losing too much water in your body, such as:  Dark pee, very little pee, or no pee.  Cracked lips.  Dry mouth.  Sunken eyes.  Sleepiness.  Weakness.  These symptoms may be an emergency. Get help right away. Call 911.  Do not wait to see if the symptoms will go away.  Do not drive yourself to the hospital.  Summary  Nausea is feeling that you have an upset stomach and that you are about to vomit. Vomiting is when food in your stomach comes out of your mouth.  Follow instructions from your doctor about eating and drinking.  Take over-the-counter and prescription medicines only as told by your doctor.  Contact your doctor if your symptoms get worse or you have new symptoms.  Keep all follow-up visits.  This information is not intended to replace advice given to you by your health care provider. Make sure you discuss any questions you have with your health care provider.  Document Revised: 06/24/2022 Document Reviewed: 06/24/2022  [employer] Patient Education © 2023 [employer]    Hypokalemia  Hypokalemia means a low potassium level in the blood. Symptoms may include muscle weakness and cramping, fatigue, abdominal pain, vomiting, constipation, or irregularities of the heartbeat. Sometimes hypokalemia is discovered by your caregiver if you are taking certain medicines for high blood pressure or kidney disease.   Potassium is an electrolyte that helps regulate the amount of fluid in the body. It also stimulates muscle contraction and maintains a stable acid-base balance. If potassium levels go too low or too high, your health may be in danger. You are at risk for developing shock, heart, and lung problems. Hypokalemia can occur if you have  excessive diarrhea, vomiting, or sweating. Potassium can be lost through your kidneys in the urine. Certain common medicines can also cause potassium loss, especially water pills (diuretics). The same is possible with cortisone medications or certain types of antibiotics. Low potassium can be dangerous if you are taking certain heart medicines. In diabetes, your potassium may fall after you take insulin, especially if your diabetes had been out of control for a while. In rare cases, potassium may be low because you are not getting enough in your diet.   In adults, a potassium level below 3.5 mEq/L is usually considered low.  Hypokalemia can be treated with potassium supplements taken by mouth and a diet that is high in potassium. Foods with high potassium content are:  Peas, lentils, lima beans, nuts, and dried fruit.   Whole grain and bran cereals and breads.   Fresh fruit and vegetables. Examples include:   Bananas.   Cantaloupe.   Grapefruit.   Oranges.   Tomatoes.   Honeydew melons.   Potatoes.   Peaches.   Orange and tomato juices.   Meats.   See your caregiver as instructed for a follow-up blood test to be sure your potassium is back to normal.  SEEK MEDICAL CARE IF:   You have nausea, vomiting, constipation, or abdominal pain.   You have palpitations or irregular heartbeats, chest pain or shortness of breath.   You have muscle cramps or weakness or fatigue.   You have lethargy.   SEEK IMMEDIATE MEDICAL CARE IF:   You have paralysis.   You have confusion or other mental status changes.   Document Released: 12/18/2006 Document Revised: 03/11/2013 Document Reviewed: 04/13/2011  [employer]® Patient Information ©2013 [employer].

## 2023-08-17 NOTE — PROGRESS NOTES
..Gastroenterology Progress Note               Author:  BIANCA Peterson Date & Time Created: 8/17/2023 7:49 AM       Patient ID:  Name:             Mary Martinez  YOB: 1970  Age:                 53 y.o.  female  MRN:               [id number]        Medical Decision Making, by Problem:  Active Hospital Problems    Diagnosis     Common bile duct dilation [K83.8]     Normocytic anemia [D64.9]     Bipolar 1 disorder (HCC) [F31.9]     Starvation ketoacidosis [T73.0XXA, E87.29]     Severe protein-calorie malnutrition (HCC) [E43]     DNR (do not resuscitate) [Z66]     Hypophosphatemia [E83.39]     Hypokalemia [E87.6]     High anion gap metabolic acidosis [E87.29]            Presenting Chief Complaint:  Melena      Interval History:  8/16/2023: EGD with Dr. Peres:  GERD grade A-B.   Mild erosion around the feeding tube in stomach.   First portion of duodenum has no bleeding, did not check 2nd portion well due to the J extension in place.     8/17/2023: Patient seen and examined. No acute complaints, feels back to her baseline and is ready to go home. Hypotensive. PEG tube with dried blood and scabs around insertion site.     Hospital Medications:  Current Facility-Administered Medications   Medication Dose Frequency Provider Last Rate Last Admin    oxyCODONE immediate-release (Roxicodone) tablet 5 mg  5 mg Q6HRS PRN Westley Gooden M.D.        Or    oxycodone (Oxy-IR) immediate release tablet 7.5 mg  7.5 mg Q6HRS PRN Westley Gooden M.D.   7.5 mg at 08/17/23 0447    ferric gluconate complex (Ferrlecit) 250 mg in  mL IVPB  250 mg BID Westley Gooden M.D. 100 mL/hr at 08/17/23 0454 250 mg at 08/17/23 0454    acetaminophen (Tylenol) tablet 650 mg  650 mg Q6HRS PRN Enzo Hogan M.D.        hydrALAZINE (Apresoline) injection 10 mg  10 mg Q4HRS PRN Enzo Hogan M.D.        ondansetron (Zofran) syringe/vial injection 4 mg  4 mg Q4HRS PRN Enzo Hogan M.D.    4 mg at 08/15/23 2129    ondansetron (Zofran ODT) dispertab 4 mg  4 mg Q4HRS PRN Enzo Hogan M.D.   4 mg at 08/15/23 1511    promethazine (Phenergan) tablet 12.5-25 mg  12.5-25 mg Q4HRS PRN Enzo Hogan M.D.        promethazine (Phenergan) suppository 12.5-25 mg  12.5-25 mg Q4HRS PRN Enzo Hogan M.D.        prochlorperazine (Compazine) injection 5-10 mg  5-10 mg Q4HRS PRN Enzo Hogan M.D.        guaiFENesin dextromethorphan (Robitussin DM) 100-10 MG/5ML syrup 10 mL  10 mL Q6HRS PRN Enzo Hogan M.D.        Pharmacy Consult Request ...Pain Management Review 1 Each  1 Each PHARMACY TO DOSE Enzo Hogan M.D.        pantoprazole (Protonix) injection 40 mg  40 mg BID Enzo Hogan M.D.   40 mg at 08/17/23 0449    PARoxetine (Paxil) tablet 60 mg  60 mg Q EVENING Enzo Hogan M.D.   60 mg at 08/16/23 1743    QUEtiapine (SEROquel) tablet 100 mg  100 mg Q EVENING Enzoyan Hogan M.D.   100 mg at 08/16/23 1742    thiamine (Vitamin B-1) tablet 100 mg  100 mg DAILY Enzoyan Hogan M.D.   100 mg at 08/17/23 0449   Last reviewed on 8/16/2023  2:33 PM by Rochelle Coon R.N.       Review of Systems:  Review of Systems   Constitutional:  Negative for chills, fever and malaise/fatigue.   HENT:  Negative for hearing loss.    Eyes:  Negative for blurred vision.   Respiratory:  Negative for cough and shortness of breath.    Cardiovascular:  Negative for chest pain and leg swelling.   Gastrointestinal:  Negative for abdominal pain, blood in stool, constipation, diarrhea, heartburn, melena, nausea and vomiting.   Genitourinary:  Negative for dysuria.   Musculoskeletal:  Negative for back pain.   Skin:  Negative for rash.   Neurological:  Negative for dizziness and weakness.   Psychiatric/Behavioral:  Negative for depression.    All other systems reviewed and are negative.        Vital signs:  Weight/BMI: Body  "mass index is 15.31 kg/m².  BP 98/67   Pulse 76   Temp 36.5 °C (97.7 °F) (Temporal)   Resp 16   Ht 1.6 m (5' 2.99\")   Wt 39.2 kg (86 lb 6.7 oz)   SpO2 95%   Vitals:    08/16/23 1517 08/16/23 2000 08/16/23 2300 08/17/23 0400   BP: 95/67 93/63 101/60 98/67   Pulse: 81 76 71 76   Resp: 15 15 16 16   Temp:  36.6 °C (97.9 °F) 36.6 °C (97.8 °F) 36.5 °C (97.7 °F)   TempSrc:  Temporal Temporal Temporal   SpO2: 98% 95% 94% 95%   Weight:  39.2 kg (86 lb 6.7 oz)     Height:         Oxygen Therapy:  Pulse Oximetry: 95 %, O2 (LPM): 0, O2 Delivery Device: None - Room Air    Intake/Output Summary (Last 24 hours) at 8/17/2023 0749  Last data filed at 8/17/2023 0300  Gross per 24 hour   Intake 550 ml   Output 2350 ml   Net -1800 ml         Physical Exam:  Physical Exam  Vitals and nursing note reviewed.   Constitutional:       General: She is in acute distress.      Comments: Cachectic     HENT:      Head: Normocephalic and atraumatic.      Right Ear: External ear normal.      Left Ear: External ear normal.      Nose: Nose normal.      Mouth/Throat:      Mouth: Mucous membranes are moist.      Pharynx: Oropharynx is clear.   Eyes:      General: No scleral icterus.  Cardiovascular:      Rate and Rhythm: Normal rate and regular rhythm.      Pulses: Normal pulses.      Heart sounds: Normal heart sounds.   Pulmonary:      Effort: Pulmonary effort is normal. No respiratory distress.      Breath sounds: Normal breath sounds.   Abdominal:      General: Abdomen is flat. Bowel sounds are normal. There is no distension.      Palpations: Abdomen is soft.      Tenderness: There is no abdominal tenderness.      Comments: PEG tube   Musculoskeletal:         General: Normal range of motion.      Cervical back: Normal range of motion.      Comments: Right sided deformity of hand   Skin:     General: Skin is warm and dry.      Capillary Refill: Capillary refill takes less than 2 seconds.   Neurological:      Mental Status: She is alert and " oriented to person, place, and time.   Psychiatric:         Mood and Affect: Mood normal.         Behavior: Behavior normal.             Labs:  Recent Labs     08/15/23  0014 08/15/23  0849 08/16/23 0432 08/17/23 0220   SODIUM 133* 134* 138 134*   POTASSIUM 2.8* 3.3* 3.3* 3.2*   CHLORIDE 109 108 112 107   CO2 11* 11* 14* 14*   BUN 24* 19 10 6*   CREATININE 0.61 0.60 0.66 0.61   MAGNESIUM 1.8 1.9 1.7 2.0   PHOSPHORUS 2.1*  --  2.1* 2.6   CALCIUM 7.9* 7.7* 8.1* 8.3*     Recent Labs     08/14/23  1643 08/15/23  0014 08/15/23  0849 08/16/23 0432 08/17/23 0220   ALTSGPT <5 14  --  29  --    ASTSGOT 12 26  --  39  --    ALKPHOSPHAT 120* 84  --  102*  --    TBILIRUBIN 0.2 <0.2  --  <0.2  --    GLUCOSE 212* 96 97 79 72     Recent Labs     08/14/23  1643 08/15/23  0014 08/15/23  0200 08/16/23 0432 08/17/23  0220   WBC 10.8  --  10.6 7.1 6.1   NEUTSPOLYS 79.90*  --  77.30*  --   --    LYMPHOCYTES 15.30*  --  17.10*  --   --    MONOCYTES 2.90  --  3.60  --   --    EOSINOPHILS 0.70  --  1.10  --   --    BASOPHILS 0.70  --  0.50  --   --    ASTSGOT 12 26  --  39  --    ALTSGPT <5 14  --  29  --    ALKPHOSPHAT 120* 84  --  102*  --    TBILIRUBIN 0.2 <0.2  --  <0.2  --      Recent Labs     08/14/23  1643 08/15/23  0200 08/15/23  0710 08/16/23 0432 08/16/23  1144 08/17/23  0220   RBC 3.26* 2.97*  --  2.38*  --  2.72*   HEMOGLOBIN 10.4* 9.5*   < > 7.5* 7.8* 8.5*   HEMATOCRIT 31.6* 28.8*  --  23.1* 23.3* 26.5*   PLATELETCT 435 333  --  315  --  349   IRON 25*  --   --   --   --   --    FERRITIN 26.1  --   --   --   --   --    TOTIRONBC 276  --   --   --   --   --     < > = values in this interval not displayed.     Recent Results (from the past 24 hour(s))   HEMOGLOBIN AND HEMATOCRIT    Collection Time: 08/16/23 11:44 AM   Result Value Ref Range    Hemoglobin 7.8 (L) 12.0 - 16.0 g/dL    Hematocrit 23.3 (L) 37.0 - 47.0 %   CBC WITHOUT DIFFERENTIAL    Collection Time: 08/17/23  2:20 AM   Result Value Ref Range    WBC 6.1 4.8 -  10.8 K/uL    RBC 2.72 (L) 4.20 - 5.40 M/uL    Hemoglobin 8.5 (L) 12.0 - 16.0 g/dL    Hematocrit 26.5 (L) 37.0 - 47.0 %    MCV 97.4 81.4 - 97.8 fL    MCH 31.3 27.0 - 33.0 pg    MCHC 32.1 (L) 32.2 - 35.5 g/dL    RDW 51.1 (H) 35.9 - 50.0 fL    Platelet Count 349 164 - 446 K/uL    MPV 9.9 9.0 - 12.9 fL   Basic Metabolic Panel    Collection Time: 08/17/23  2:20 AM   Result Value Ref Range    Sodium 134 (L) 135 - 145 mmol/L    Potassium 3.2 (L) 3.6 - 5.5 mmol/L    Chloride 107 96 - 112 mmol/L    Co2 14 (L) 20 - 33 mmol/L    Glucose 72 65 - 99 mg/dL    Bun 6 (L) 8 - 22 mg/dL    Creatinine 0.61 0.50 - 1.40 mg/dL    Calcium 8.3 (L) 8.5 - 10.5 mg/dL    Anion Gap 13.0 7.0 - 16.0   MAGNESIUM    Collection Time: 08/17/23  2:20 AM   Result Value Ref Range    Magnesium 2.0 1.5 - 2.5 mg/dL   PHOSPHORUS    Collection Time: 08/17/23  2:20 AM   Result Value Ref Range    Phosphorus 2.6 2.5 - 4.5 mg/dL   ESTIMATED GFR    Collection Time: 08/17/23  2:20 AM   Result Value Ref Range    GFR (CKD-EPI) 108 >60 mL/min/1.73 m 2       Radiology Review:  IR-US GUIDED PIV   Final Result    Ultrasound-guided PERIPHERAL IV INSERTION performed by    qualified nursing staff as above.      [id number]   Final Result      1.  No choledocholithiasis.   2.  Prior cholecystectomy.         CT-ABDOMEN-PELVIS W/O   Final Result      1.  Punctate hyperdensity in the distal CBD, which is dilated up to 1 cm. This may represent choledocholithiasis. Consider MRCP.   2.  Air-fluid levels in the colon, suggestive of diarrhea.   3.  Bilateral medullary nephrocalcinosis and bilateral nonobstructing renal stones.      DX-CHEST-PORTABLE (1 VIEW)   Final Result      No acute cardiopulmonary abnormality.               MDM (Data Review):   -Records reviewed and summarized in current documentation  -I personally reviewed and interpreted the laboratory results  -I personally reviewed the radiology images    Assessment/Recommendations:  53 years female with medical history  of rheumatoid arthritis, anxiety, asthma, GERD, chronic abdominal pain, refractory nausea and vomiting, low BMI, s/p endoscopy PEG tube with J extension in June 2023 by our GI team presented to the hospital for similar presentation and also dropping hemoglobin and dark stool. EGD with no acute findings.    Recommendations:  Flush PEG tube and clean insertion site  Return to previous diet   Monitor for further bleeding    GI team signing off. Plan of care discussed with patient, RN, Dr. Gooden, and Dr. Lua              Core Quality Measures   Reviewed items::  Labs, Medications and Radiology reports reviewed

## 2023-08-17 NOTE — PROGRESS NOTES
Discharge instructions reviewed with patient. Discussed the importance of going to her follow up appointment and [facility] for [facility] follow up with them as well. Patient pending meds to beds which she states she may not wait for. Encouraged patient to wait for them. No further questions.

## 2023-08-17 NOTE — DISCHARGE PLANNING
Case Management Discharge Planning     Admission Date: 8/14/2023  GMLOS: 2.6  ALOS: 2     6-Clicks ADL Score: 15  6-Clicks Mobility Score: 15     Anticipated Discharge Dispo: Discharge Disposition: D/T to home hha    LMSW spoke with pt about needing to expand choice due to declination of first chosen facility.   LMSW got choice for another HH agency and faxed form to [facility].    LMSW added resources for social determinants for this pts AVS report.     LMSW to ensure HH needs are met prior to pt discharging today, 8/17.     1152 UPDATE  LMSW spoke with Mitchell from [facility].  Mitchell went to see pt and got all questions answered from pt.  [facility] will be accepting pt for PEG and PT if needed.  Mitchell states pt has a PCP apt set up with [facility] on Sep 1st.

## 2023-08-19 LAB
BACTERIA BLD CULT: NORMAL
BACTERIA BLD CULT: NORMAL
BACTERIA STL CULT: ABNORMAL
BACTERIA STL CULT: ABNORMAL
SIGNIFICANT IND 70042: ABNORMAL
SIGNIFICANT IND 70042: NORMAL
SIGNIFICANT IND 70042: NORMAL
SITE SITE: ABNORMAL
SITE SITE: NORMAL
SITE SITE: NORMAL
SOURCE SOURCE: ABNORMAL
SOURCE SOURCE: NORMAL
SOURCE SOURCE: NORMAL

## 2023-08-24 ENCOUNTER — HOSPITAL ENCOUNTER (OUTPATIENT)
Facility: MEDICAL CENTER | Age: 53
End: 2023-08-25
Attending: EMERGENCY MEDICINE | Admitting: INTERNAL MEDICINE
Payer: MEDICAID

## 2023-08-24 ENCOUNTER — APPOINTMENT (OUTPATIENT)
Dept: RADIOLOGY | Facility: MEDICAL CENTER | Age: 53
End: 2023-08-24
Attending: INTERNAL MEDICINE
Payer: MEDICAID

## 2023-08-24 ENCOUNTER — APPOINTMENT (OUTPATIENT)
Dept: RADIOLOGY | Facility: MEDICAL CENTER | Age: 53
End: 2023-08-24
Attending: EMERGENCY MEDICINE
Payer: MEDICAID

## 2023-08-24 DIAGNOSIS — K94.23 MALFUNCTION OF GASTROSTOMY TUBE (HCC): ICD-10-CM

## 2023-08-24 DIAGNOSIS — K94.20 COMPLICATION OF FEEDING TUBE (HCC): ICD-10-CM

## 2023-08-24 DIAGNOSIS — K59.00 CONSTIPATION, UNSPECIFIED CONSTIPATION TYPE: ICD-10-CM

## 2023-08-24 DIAGNOSIS — K85.90 ACUTE PANCREATITIS, UNSPECIFIED COMPLICATION STATUS, UNSPECIFIED PANCREATITIS TYPE: ICD-10-CM

## 2023-08-24 PROBLEM — R10.9 ABDOMINAL PAIN: Status: ACTIVE | Noted: 2023-08-24

## 2023-08-24 PROBLEM — K21.9 GERD (GASTROESOPHAGEAL REFLUX DISEASE): Status: ACTIVE | Noted: 2023-08-24

## 2023-08-24 LAB
ALBUMIN SERPL BCP-MCNC: 3.3 G/DL (ref 3.2–4.9)
ALBUMIN/GLOB SERPL: 1.3 G/DL
ALP SERPL-CCNC: 106 U/L (ref 30–99)
ALT SERPL-CCNC: 8 U/L (ref 2–50)
ANION GAP SERPL CALC-SCNC: 12 MMOL/L (ref 7–16)
AST SERPL-CCNC: 12 U/L (ref 12–45)
BASOPHILS # BLD AUTO: 0.6 % (ref 0–1.8)
BASOPHILS # BLD: 0.05 K/UL (ref 0–0.12)
BILIRUB SERPL-MCNC: <0.2 MG/DL (ref 0.1–1.5)
BUN SERPL-MCNC: 13 MG/DL (ref 8–22)
CALCIUM ALBUM COR SERPL-MCNC: 8.9 MG/DL (ref 8.5–10.5)
CALCIUM SERPL-MCNC: 8.3 MG/DL (ref 8.5–10.5)
CHLORIDE SERPL-SCNC: 114 MMOL/L (ref 96–112)
CO2 SERPL-SCNC: 16 MMOL/L (ref 20–33)
CREAT SERPL-MCNC: 0.65 MG/DL (ref 0.5–1.4)
EOSINOPHIL # BLD AUTO: 0.12 K/UL (ref 0–0.51)
EOSINOPHIL NFR BLD: 1.5 % (ref 0–6.9)
ERYTHROCYTE [DISTWIDTH] IN BLOOD BY AUTOMATED COUNT: 58.3 FL (ref 35.9–50)
GFR SERPLBLD CREATININE-BSD FMLA CKD-EPI: 106 ML/MIN/1.73 M 2
GLOBULIN SER CALC-MCNC: 2.6 G/DL (ref 1.9–3.5)
GLUCOSE SERPL-MCNC: 106 MG/DL (ref 65–99)
HCT VFR BLD AUTO: 30.4 % (ref 37–47)
HGB BLD-MCNC: 9.7 G/DL (ref 12–16)
IMM GRANULOCYTES # BLD AUTO: 0.02 K/UL (ref 0–0.11)
IMM GRANULOCYTES NFR BLD AUTO: 0.2 % (ref 0–0.9)
LIPASE SERPL-CCNC: 167 U/L (ref 11–82)
LYMPHOCYTES # BLD AUTO: 2.35 K/UL (ref 1–4.8)
LYMPHOCYTES NFR BLD: 29.3 % (ref 22–41)
MCH RBC QN AUTO: 32.2 PG (ref 27–33)
MCHC RBC AUTO-ENTMCNC: 31.9 G/DL (ref 32.2–35.5)
MCV RBC AUTO: 101 FL (ref 81.4–97.8)
MONOCYTES # BLD AUTO: 0.43 K/UL (ref 0–0.85)
MONOCYTES NFR BLD AUTO: 5.4 % (ref 0–13.4)
NEUTROPHILS # BLD AUTO: 5.04 K/UL (ref 1.82–7.42)
NEUTROPHILS NFR BLD: 63 % (ref 44–72)
NRBC # BLD AUTO: 0 K/UL
NRBC BLD-RTO: 0 /100 WBC (ref 0–0.2)
PLATELET # BLD AUTO: 388 K/UL (ref 164–446)
PMV BLD AUTO: 9.6 FL (ref 9–12.9)
POTASSIUM SERPL-SCNC: 3.3 MMOL/L (ref 3.6–5.5)
PROT SERPL-MCNC: 5.9 G/DL (ref 6–8.2)
RBC # BLD AUTO: 3.01 M/UL (ref 4.2–5.4)
SODIUM SERPL-SCNC: 142 MMOL/L (ref 135–145)
WBC # BLD AUTO: 8 K/UL (ref 4.8–10.8)

## 2023-08-24 PROCEDURE — 85025 COMPLETE CBC W/AUTO DIFF WBC: CPT

## 2023-08-24 PROCEDURE — 99223 1ST HOSP IP/OBS HIGH 75: CPT | Performed by: INTERNAL MEDICINE

## 2023-08-24 PROCEDURE — 700111 HCHG RX REV CODE 636 W/ 250 OVERRIDE (IP): Mod: UD | Performed by: NURSE PRACTITIONER

## 2023-08-24 PROCEDURE — A9270 NON-COVERED ITEM OR SERVICE: HCPCS | Mod: UD | Performed by: INTERNAL MEDICINE

## 2023-08-24 PROCEDURE — G0378 HOSPITAL OBSERVATION PER HR: HCPCS

## 2023-08-24 PROCEDURE — 99285 EMERGENCY DEPT VISIT HI MDM: CPT

## 2023-08-24 PROCEDURE — 96374 THER/PROPH/DIAG INJ IV PUSH: CPT | Mod: XU

## 2023-08-24 PROCEDURE — 49465 FLUORO EXAM OF G/COLON TUBE: CPT

## 2023-08-24 PROCEDURE — 700102 HCHG RX REV CODE 250 W/ 637 OVERRIDE(OP): Mod: UD | Performed by: INTERNAL MEDICINE

## 2023-08-24 PROCEDURE — 700111 HCHG RX REV CODE 636 W/ 250 OVERRIDE (IP): Mod: JZ,UD | Performed by: EMERGENCY MEDICINE

## 2023-08-24 PROCEDURE — 80053 COMPREHEN METABOLIC PANEL: CPT

## 2023-08-24 PROCEDURE — 36415 COLL VENOUS BLD VENIPUNCTURE: CPT

## 2023-08-24 PROCEDURE — 700105 HCHG RX REV CODE 258: Mod: UD | Performed by: INTERNAL MEDICINE

## 2023-08-24 PROCEDURE — 74018 RADEX ABDOMEN 1 VIEW: CPT

## 2023-08-24 PROCEDURE — 700105 HCHG RX REV CODE 258: Mod: UD | Performed by: EMERGENCY MEDICINE

## 2023-08-24 PROCEDURE — 83690 ASSAY OF LIPASE: CPT

## 2023-08-24 PROCEDURE — 96375 TX/PRO/DX INJ NEW DRUG ADDON: CPT | Mod: XU

## 2023-08-24 PROCEDURE — 700101 HCHG RX REV CODE 250: Mod: UD | Performed by: INTERNAL MEDICINE

## 2023-08-24 PROCEDURE — 700117 HCHG RX CONTRAST REV CODE 255: Mod: UD | Performed by: INTERNAL MEDICINE

## 2023-08-24 RX ORDER — OXYCODONE HYDROCHLORIDE AND ACETAMINOPHEN 5; 325 MG/1; MG/1
1 TABLET ORAL EVERY 6 HOURS PRN
Status: DISCONTINUED | OUTPATIENT
Start: 2023-08-24 | End: 2023-08-25 | Stop reason: HOSPADM

## 2023-08-24 RX ORDER — BISACODYL 10 MG
10 SUPPOSITORY, RECTAL RECTAL
Status: DISCONTINUED | OUTPATIENT
Start: 2023-08-24 | End: 2023-08-25 | Stop reason: HOSPADM

## 2023-08-24 RX ORDER — ONDANSETRON 2 MG/ML
4 INJECTION INTRAMUSCULAR; INTRAVENOUS ONCE
Status: COMPLETED | OUTPATIENT
Start: 2023-08-24 | End: 2023-08-24

## 2023-08-24 RX ORDER — POTASSIUM CHLORIDE 20 MEQ/1
20 TABLET, EXTENDED RELEASE ORAL DAILY
Status: DISCONTINUED | OUTPATIENT
Start: 2023-08-24 | End: 2023-08-25 | Stop reason: HOSPADM

## 2023-08-24 RX ORDER — OMEPRAZOLE 20 MG/1
20 CAPSULE, DELAYED RELEASE ORAL DAILY
Status: DISCONTINUED | OUTPATIENT
Start: 2023-08-24 | End: 2023-08-25 | Stop reason: HOSPADM

## 2023-08-24 RX ORDER — KETOROLAC TROMETHAMINE 30 MG/ML
15 INJECTION, SOLUTION INTRAMUSCULAR; INTRAVENOUS EVERY 6 HOURS PRN
Status: DISCONTINUED | OUTPATIENT
Start: 2023-08-24 | End: 2023-08-25 | Stop reason: HOSPADM

## 2023-08-24 RX ORDER — PROCHLORPERAZINE EDISYLATE 5 MG/ML
5-10 INJECTION INTRAMUSCULAR; INTRAVENOUS EVERY 4 HOURS PRN
Status: DISCONTINUED | OUTPATIENT
Start: 2023-08-24 | End: 2023-08-25 | Stop reason: HOSPADM

## 2023-08-24 RX ORDER — PROMETHAZINE HYDROCHLORIDE 25 MG/1
12.5-25 SUPPOSITORY RECTAL EVERY 4 HOURS PRN
Status: DISCONTINUED | OUTPATIENT
Start: 2023-08-24 | End: 2023-08-25 | Stop reason: HOSPADM

## 2023-08-24 RX ORDER — SODIUM CHLORIDE 9 MG/ML
INJECTION, SOLUTION INTRAVENOUS CONTINUOUS
Status: DISCONTINUED | OUTPATIENT
Start: 2023-08-24 | End: 2023-08-25

## 2023-08-24 RX ORDER — ONDANSETRON 4 MG/1
4 TABLET, ORALLY DISINTEGRATING ORAL EVERY 4 HOURS PRN
Status: DISCONTINUED | OUTPATIENT
Start: 2023-08-24 | End: 2023-08-25 | Stop reason: HOSPADM

## 2023-08-24 RX ORDER — MORPHINE SULFATE 4 MG/ML
2 INJECTION INTRAVENOUS ONCE
Status: COMPLETED | OUTPATIENT
Start: 2023-08-24 | End: 2023-08-24

## 2023-08-24 RX ORDER — AMOXICILLIN 250 MG
2 CAPSULE ORAL 2 TIMES DAILY
Status: DISCONTINUED | OUTPATIENT
Start: 2023-08-24 | End: 2023-08-25 | Stop reason: HOSPADM

## 2023-08-24 RX ORDER — OXYCODONE AND ACETAMINOPHEN 10; 325 MG/1; MG/1
1 TABLET ORAL EVERY 4 HOURS PRN
Status: DISCONTINUED | OUTPATIENT
Start: 2023-08-24 | End: 2023-08-25 | Stop reason: HOSPADM

## 2023-08-24 RX ORDER — MIDODRINE HYDROCHLORIDE 5 MG/1
10 TABLET ORAL 3 TIMES DAILY
Status: DISCONTINUED | OUTPATIENT
Start: 2023-08-24 | End: 2023-08-25 | Stop reason: HOSPADM

## 2023-08-24 RX ORDER — PROMETHAZINE HYDROCHLORIDE 25 MG/1
12.5-25 TABLET ORAL EVERY 4 HOURS PRN
Status: DISCONTINUED | OUTPATIENT
Start: 2023-08-24 | End: 2023-08-25 | Stop reason: HOSPADM

## 2023-08-24 RX ORDER — ONDANSETRON 2 MG/ML
4 INJECTION INTRAMUSCULAR; INTRAVENOUS EVERY 4 HOURS PRN
Status: DISCONTINUED | OUTPATIENT
Start: 2023-08-24 | End: 2023-08-25 | Stop reason: HOSPADM

## 2023-08-24 RX ORDER — ACETAMINOPHEN 325 MG/1
325-650 TABLET ORAL EVERY 6 HOURS PRN
COMMUNITY

## 2023-08-24 RX ORDER — SODIUM CHLORIDE 9 MG/ML
INJECTION, SOLUTION INTRAVENOUS CONTINUOUS
Status: DISCONTINUED | OUTPATIENT
Start: 2023-08-24 | End: 2023-08-25 | Stop reason: HOSPADM

## 2023-08-24 RX ORDER — GAUZE BANDAGE 2" X 2"
100 BANDAGE TOPICAL DAILY
Status: DISCONTINUED | OUTPATIENT
Start: 2023-08-24 | End: 2023-08-25 | Stop reason: HOSPADM

## 2023-08-24 RX ORDER — POLYETHYLENE GLYCOL 3350 17 G/17G
1 POWDER, FOR SOLUTION ORAL
Status: DISCONTINUED | OUTPATIENT
Start: 2023-08-24 | End: 2023-08-25 | Stop reason: HOSPADM

## 2023-08-24 RX ORDER — OXYCODONE HYDROCHLORIDE AND ACETAMINOPHEN 5; 325 MG/1; MG/1
1 TABLET ORAL EVERY 6 HOURS PRN
Status: DISCONTINUED | OUTPATIENT
Start: 2023-08-24 | End: 2023-08-24

## 2023-08-24 RX ORDER — FERROUS SULFATE 325(65) MG
325 TABLET ORAL DAILY
Status: DISCONTINUED | OUTPATIENT
Start: 2023-08-24 | End: 2023-08-25 | Stop reason: HOSPADM

## 2023-08-24 RX ORDER — QUETIAPINE FUMARATE 100 MG/1
100 TABLET, FILM COATED ORAL EVERY EVENING
Status: DISCONTINUED | OUTPATIENT
Start: 2023-08-24 | End: 2023-08-25 | Stop reason: HOSPADM

## 2023-08-24 RX ORDER — PAROXETINE 30 MG/1
60 TABLET, FILM COATED ORAL EVERY EVENING
Status: DISCONTINUED | OUTPATIENT
Start: 2023-08-24 | End: 2023-08-25 | Stop reason: HOSPADM

## 2023-08-24 RX ORDER — ACETAMINOPHEN 325 MG/1
650 TABLET ORAL EVERY 6 HOURS PRN
Status: DISCONTINUED | OUTPATIENT
Start: 2023-08-24 | End: 2023-08-25 | Stop reason: HOSPADM

## 2023-08-24 RX ORDER — ESOMEPRAZOLE MAGNESIUM 40 MG/1
40 CAPSULE, DELAYED RELEASE ORAL DAILY
Status: DISCONTINUED | OUTPATIENT
Start: 2023-08-24 | End: 2023-08-24

## 2023-08-24 RX ADMIN — POTASSIUM CHLORIDE 20 MEQ: 20 TABLET, EXTENDED RELEASE ORAL at 17:00

## 2023-08-24 RX ADMIN — POLYETHYLENE GLYCOL-3350 AND ELECTROLYTES 0.5 L: 236; 6.74; 5.86; 2.97; 22.74 POWDER, FOR SOLUTION ORAL at 21:40

## 2023-08-24 RX ADMIN — OXYCODONE AND ACETAMINOPHEN 1 TABLET: 5; 325 TABLET ORAL at 17:36

## 2023-08-24 RX ADMIN — QUETIAPINE FUMARATE 100 MG: 100 TABLET ORAL at 19:09

## 2023-08-24 RX ADMIN — OMEPRAZOLE 20 MG: 20 CAPSULE, DELAYED RELEASE ORAL at 17:31

## 2023-08-24 RX ADMIN — MIDODRINE HYDROCHLORIDE 10 MG: 5 TABLET ORAL at 19:09

## 2023-08-24 RX ADMIN — OXYCODONE HYDROCHLORIDE AND ACETAMINOPHEN 1 TABLET: 10; 325 TABLET ORAL at 19:09

## 2023-08-24 RX ADMIN — IOHEXOL 35 ML: 350 INJECTION, SOLUTION INTRAVENOUS at 18:00

## 2023-08-24 RX ADMIN — FERROUS SULFATE TAB 325 MG (65 MG ELEMENTAL FE) 325 MG: 325 (65 FE) TAB at 17:32

## 2023-08-24 RX ADMIN — SENNOSIDES AND DOCUSATE SODIUM 2 TABLET: 50; 8.6 TABLET ORAL at 17:32

## 2023-08-24 RX ADMIN — Medication 100 MG: at 17:32

## 2023-08-24 RX ADMIN — SODIUM CHLORIDE: 9 INJECTION, SOLUTION INTRAVENOUS at 14:23

## 2023-08-24 RX ADMIN — MORPHINE SULFATE 2 MG: 4 INJECTION, SOLUTION INTRAMUSCULAR; INTRAVENOUS at 14:23

## 2023-08-24 RX ADMIN — PAROXETINE 60 MG: 30 TABLET, FILM COATED ORAL at 19:09

## 2023-08-24 RX ADMIN — KETOROLAC TROMETHAMINE 15 MG: 30 INJECTION, SOLUTION INTRAMUSCULAR; INTRAVENOUS at 21:51

## 2023-08-24 RX ADMIN — SODIUM CHLORIDE: 9 INJECTION, SOLUTION INTRAVENOUS at 17:35

## 2023-08-24 RX ADMIN — ONDANSETRON 4 MG: 2 INJECTION INTRAMUSCULAR; INTRAVENOUS at 14:22

## 2023-08-24 ASSESSMENT — PATIENT HEALTH QUESTIONNAIRE - PHQ9
2. FEELING DOWN, DEPRESSED, IRRITABLE, OR HOPELESS: NOT AT ALL
2. FEELING DOWN, DEPRESSED, IRRITABLE, OR HOPELESS: NOT AT ALL
1. LITTLE INTEREST OR PLEASURE IN DOING THINGS: NOT AT ALL
3. TROUBLE FALLING OR STAYING ASLEEP OR SLEEPING TOO MUCH: NOT AT ALL
9. THOUGHTS THAT YOU WOULD BE BETTER OFF DEAD, OR OF HURTING YOURSELF: NOT AT ALL
SUM OF ALL RESPONSES TO PHQ9 QUESTIONS 1 AND 2: 0
6. FEELING BAD ABOUT YOURSELF - OR THAT YOU ARE A FAILURE OR HAVE LET YOURSELF OR YOUR FAMILY DOWN: NOT AL ALL
SUM OF ALL RESPONSES TO PHQ QUESTIONS 1-9: 0
4. FEELING TIRED OR HAVING LITTLE ENERGY: NOT AT ALL
7. TROUBLE CONCENTRATING ON THINGS, SUCH AS READING THE NEWSPAPER OR WATCHING TELEVISION: NOT AT ALL
5. POOR APPETITE OR OVEREATING: NOT AT ALL
1. LITTLE INTEREST OR PLEASURE IN DOING THINGS: NOT AT ALL
SUM OF ALL RESPONSES TO PHQ9 QUESTIONS 1 AND 2: 0
8. MOVING OR SPEAKING SO SLOWLY THAT OTHER PEOPLE COULD HAVE NOTICED. OR THE OPPOSITE, BEING SO FIGETY OR RESTLESS THAT YOU HAVE BEEN MOVING AROUND A LOT MORE THAN USUAL: NOT AT ALL

## 2023-08-24 ASSESSMENT — PAIN DESCRIPTION - PAIN TYPE
TYPE: ACUTE PAIN
TYPE: ACUTE PAIN

## 2023-08-24 ASSESSMENT — LIFESTYLE VARIABLES
AVERAGE NUMBER OF DAYS PER WEEK YOU HAVE A DRINK CONTAINING ALCOHOL: 0
TOTAL SCORE: 0
ON A TYPICAL DAY WHEN YOU DRINK ALCOHOL HOW MANY DRINKS DO YOU HAVE: 0
HAVE YOU EVER FELT YOU SHOULD CUT DOWN ON YOUR DRINKING: NO
TOTAL SCORE: 0
HAVE PEOPLE ANNOYED YOU BY CRITICIZING YOUR DRINKING: NO
EVER FELT BAD OR GUILTY ABOUT YOUR DRINKING: NO
ALCOHOL_USE: NO
CONSUMPTION TOTAL: NEGATIVE
EVER HAD A DRINK FIRST THING IN THE MORNING TO STEADY YOUR NERVES TO GET RID OF A HANGOVER: NO
TOTAL SCORE: 0
HOW MANY TIMES IN THE PAST YEAR HAVE YOU HAD 5 OR MORE DRINKS IN A DAY: 0

## 2023-08-24 ASSESSMENT — ENCOUNTER SYMPTOMS
FEVER: 0
CONSTIPATION: 1
NAUSEA: 1
ABDOMINAL PAIN: 1
COUGH: 0
VOMITING: 1
CHILLS: 0
PALPITATIONS: 0

## 2023-08-24 ASSESSMENT — FIBROSIS 4 INDEX
FIB4 SCORE: 1.06
FIB4 SCORE: 0.56

## 2023-08-24 NOTE — ED PROVIDER NOTES
"ED Provider Note    CHIEF COMPLAINT  Chief Complaint   Patient presents with    Abdominal Pain     Pt c/o abd/epigastric pain x 1 wk     Epigastric Pain    N/V    Other     Pt states possible G-tube complication/unable to flush        EXTERNAL RECORDS REVIEWED  Inpatient Notes recent hospitalization, EGD showing GERD and anemia sent home on a PPI and iron    HPI/ROS  LIMITATION TO HISTORY   Select: : None      Mary Martinez is a 53 y.o. female who presents with a few different issues.  The patient been having some abdominal pain.  She puts it, this is not out of the ordinary in terms of the character but the severity of it is little bit worse than usual.  She also is associated nausea and vomiting.  Both of these things are chronic issues for her, she states \"I am used to that.\"  Although again she does point out a little bit worse than usual.  She been somewhat constipated but does not think too much of that as she has had a diminished appetite.  Has been trying to eat, whenever she does this makes her belly hurt worse.  She is forcing herself however to try to get something in terms of nutrition.  She typically gives herself Ensure through a jejunostomy tube, however that has been clogged for at least a week.  This was initially placed by Dr. Loomis back in June.  This is made it difficult for her to get a lot of fluid and nutrition.  She denies any fever.  This feels somewhat similar when she had pancreatitis in the past.  No change in urine.  There is no other complaint.    PAST MEDICAL HISTORY   has a past medical history of Acute renal failure (ARF) ([facility]), Allergy, Anemia, Anxiety, Arthritis, ASTHMA, Blood transfusion without reported diagnosis, Bowel habit changes, Bronchitis (last approx 2018), Chronic pain (04/24/2020), Dental disorder, Depression, GERD (gastroesophageal reflux disease), GIB (gastrointestinal bleeding), Head ache, Heart burn, Hiatus hernia syndrome, History of pancreatitis, " Hypokalemia, Hyponatremia, Idiopathic chronic pancreatitis (HCC), Indigestion, Intractable nausea and vomiting, Kidney disease, Pain, Pneumonia (10/2019), PONV (postoperative nausea and vomiting), Psychiatric problem, Rheumatoid aortitis, Rheumatoid arthritis(714.0) (2003), SMAS (superior mesenteric artery syndrome) (HCC), and Substance abuse (HCC).    SURGICAL HISTORY   has a past surgical history that includes abdominal abscess drainage (9/27/2011); toe fusion (Right, 5/27/2015); bone spur excision (5/27/2015); hammertoe correction (5/27/2015); foot reconstruction rheumatic (Left, 7/29/2015); arthrodesis (Right, 5/6/2016); fusion, pip joint, toe (Right, 8/29/2016); bone graft (Right, 8/29/2016); irrigation & debridement ortho (Right, 9/11/2016); finger amputation (Right, 9/14/2016); finger arthroplasty (Right, 4/17/2017); finger arthroplasty (Right, 6/5/2017); finger amputation (6/5/2017); exploratory of abdomen (N/A, 10/4/2019); tonsillectomy (1982); primary c section (1991); repeat c section (1999); repeat c section (2005); repeat c section (2008); appendectomy (2011); nicholas by laparoscopy (9/27/2011); wrist exploration (Left, 1980's); colonoscopy (2018); dental extraction(s) (2014); endoscopic us exam, esoph (4/29/2020); gastroscopy-endo (4/29/2020); egd with asp/bx (4/29/2020); upper gi endoscopy,diagnosis (N/A, 9/9/2022); egd w/endoscopic ultrasound (N/A, 9/9/2022); place percut gastrostomy tube (N/A, 6/12/2023); upper gi endoscopy,diagnosis (6/12/2023); and upper gi endoscopy,diagnosis (N/A, 8/16/2023).    FAMILY HISTORY  Family History   Problem Relation Age of Onset    Cancer Mother     Heart Disease Mother     Hypertension Mother     Heart Disease Father     Hypertension Father        SOCIAL HISTORY  Social History     Tobacco Use    Smoking status: Every Day     Current packs/day: 0.50     Average packs/day: 0.5 packs/day for 30.0 years (15.0 ttl pk-yrs)     Types: Cigarettes    Smokeless tobacco: Never    Vaping Use    Vaping Use: Never used   Substance and Sexual Activity    Alcohol use: Never    Drug use: Never    Sexual activity: Not Currently       CURRENT MEDICATIONS  Home Medications       Reviewed by Indira Elena R.N. (Registered Nurse) on 08/24/23 at 1343  Med List Status: <None>     Medication Last Dose Status   esomeprazole (NEXIUM) 40 MG delayed-release capsule  Active   ferrous sulfate 325 (65 Fe) MG tablet  Active   midodrine (PROAMATINE) 10 MG tablet  Active   Naloxone (NARCAN) 4 MG/0.1ML Liquid  Active   oxyCODONE-acetaminophen (PERCOCET) 7.5-325 MG per tablet  Active   PARoxetine (PAXIL) 30 MG Tab  Active   QUEtiapine (SEROQUEL) 100 MG Tab  Active   thiamine (THIAMINE) 100 MG tablet  Active                    ALLERGIES  Allergies   Allergen Reactions    Penicillins Anaphylaxis and Hives     Tolerates cephalosporins; reports throat swelling with PCN    Aripiprazole Nausea     Spasms, shaking      Nitrous Oxide Vomiting       PHYSICAL EXAM  VITAL SIGNS: BP (!) 88/53   Pulse 86   Temp 36.7 °C (98 °F) (Temporal)   Resp 16   Wt 39 kg (86 lb)   LMP 09/21/2011   SpO2 98%   BMI 15.24 kg/m²    Constitutional: Well appearing patient in no acute distress.  HENT: Head is without trauma.  Oropharynx is clear.  Mucous membranes are moist.  Eyes: Sclerae are nonicteric, pupils are equally round.  Neck: Supple with grossly normal range of motion.  Cardiovascular: Heart is regular rate and rhythm without murmur rub or gallop.  Peripheral pulses are intact and symmetric throughout.  Thorax & Lungs: Breathing easily.  Good air movement.  There is no wheeze, rhonchi or rales.  Abdomen: Softly distended.  Mild diffuse tenderness with deep palpation.  No rebound or guarding however.  No obvious mass.  There is a gastrostomy tube present.  No surrounding evidence of infection based upon the surrounding skin.  Skin: No apparent rash.  I do not see petechiae or purpura.  Extremities: No evidence of acute trauma.   No clubbing, cyanosis, edema, no Homans or cords.  Neurologic: Alert. Moving all extremities.  Intact sensation and strength throughout.  Gait is normal.  Psychiatric: Normal for situation      DIAGNOSTIC STUDIES / PROCEDURES  LABS  Labs Reviewed   CBC WITH DIFFERENTIAL - Abnormal; Notable for the following components:       Result Value    RBC 3.01 (*)     Hemoglobin 9.7 (*)     Hematocrit 30.4 (*)     .0 (*)     MCHC 31.9 (*)     RDW 58.3 (*)     All other components within normal limits   COMP METABOLIC PANEL - Abnormal; Notable for the following components:    Potassium 3.3 (*)     Chloride 114 (*)     Co2 16 (*)     Glucose 106 (*)     Calcium 8.3 (*)     Alkaline Phosphatase 106 (*)     Total Protein 5.9 (*)     All other components within normal limits   LIPASE - Abnormal; Notable for the following components:    Lipase 167 (*)     All other components within normal limits   ESTIMATED GFR         RADIOLOGY  I have independently interpreted the diagnostic imaging associated with this visit and am waiting the final reading from the radiologist.   My preliminary interpretation is as follows: Constipation  Radiologist interpretation:   [id number] [id number]   Final Result      1.  Severe constipation.      DX-G.I. TUBE INJECTION, ANY TYPE    (Results Pending)         COURSE & MEDICAL DECISION MAKING    ED Observation Status? Yes; I am placing the patient in to an observation status due to a diagnostic uncertainty as well as therapeutic intensity. Patient placed in observation status at 2:00 PM, 8/24/2023.     Observation plan is as follows: We will check laboratories, plain film, serial belly exams.  I written for morphine and Zofran.  I written for IV fluids.    Upon Reevaluation, the patient's condition has: not improved; and will be escalated to hospitalization.    Patient discharged from ED Observation status at 1600 (Time) 8/24/2023  (Date).     INITIAL ASSESSMENT, COURSE AND PLAN  Care Narrative: This  the patient presents with belly pain, nausea and vomiting.  This sounds to be somewhat chronic for her but potentially worse than typical.  I ordered laboratories, IV fluids, morphine and Zofran.    Laboratories returning showing no evidence of a leukocytosis.  No shift.  Basic chemistries are unrevealing.  She does have elevation of her lipase.  Combined with her symptoms of this feeling like pancreatitis in the past, this likely does represent some mild pancreatitis.  Consideration for imaging such as a CT scan but she just had a recent CT and MR, I do not think that this is indicated at this time.  A plain film does show some constipation which is likely contributing to her symptoms.  For both of these reasons I want to put her in the hospital for IV analgesia, antiemetics, parenteral fluids, and likely start addressing that constipation.  With respect to that feeding tube this will also need to be addressed.  All this was discussed with Dr. Ibarra, the hospitalist, who has seen and admitted her.  The patient for her part is comfortable to plan, agrees to admission.          DISPOSITION AND DISCUSSIONS  I have discussed management of the patient with the following physicians and HALIMA's: Hospitalist.  Also discussed with case management of observation versus inpatient status.  They recommended observation at this time.    Escalation of care considered, and ultimately not performed:diagnostic imaging      FINAL DIAGNOSIS  1. Acute pancreatitis, unspecified complication status, unspecified pancreatitis type    2. Complication of feeding tube (HCC)    3. Constipation, unspecified constipation type           Electronically signed by: Heriberto Jensen M.D., 8/24/2023 2:10 PM

## 2023-08-24 NOTE — ED TRIAGE NOTES
Pt to [facility] .  Chief Complaint   Patient presents with    Abdominal Pain     Pt c/o abd/epigastric pain x 1 wk     Epigastric Pain    N/V    Other     Pt states possible G-tube complication/unable to flush

## 2023-08-24 NOTE — ASSESSMENT & PLAN NOTE
Takes APAP and oxycodone at home  Continue oral regimen, try to avoid IV opiates given severe constipation

## 2023-08-24 NOTE — ASSESSMENT & PLAN NOTE
"Body mass index is 15.66 kg/m².  Noted on physical exam  Needs to get nocturnal TF\"s for supplementation  Needs boost  RD consult  "

## 2023-08-24 NOTE — ASSESSMENT & PLAN NOTE
"Pain not well controlled  No acute abdomen  G tube in place, appears to not be working well  Nocturnal TF\"s were supposed to be set up on discharge in 6/2023, but never done  SW/CM consult  "

## 2023-08-24 NOTE — ED NOTES
[facility] Provider Note    Chief Complaint   Patient presents with   • Fever     Fever up and down since yesturday 101 highest, mom states pt had fever and ear inf about 2 weeks ago.        Mother brings in child for a fever that started yesterday she states she did give Motrin and Tylenol at 14 noticed a fever again so she brings her in for further evaluation she was seen here on the third was prescribed amoxicillin for left ear infection mother states after the antibiotics child was doing better she also follow-up with primary  This time the fever started yesterday no vomiting normal urination normal bowel movement child is tolerating formula runny nose congestion no cough  No sick contacts  Up-to-date immunizations no pertinent medical history      Medications:  Current Outpatient Medications   Medication Sig   • cefdinir (OMNICEF) 250 MG/5ML suspension Take 2.1 mLs by mouth daily for 10 days.   • hydroCORTisone (CORTIZONE) 2.5 % ointment APPLY THIN LAYER TOPICALLY TO THE AFFECTED AREA TWICE DAILY   • acetaminophen (TYLENOL) 40 MG/1.25mL suspension Take by mouth every 4 hours as needed for Fever.     No current facility-administered medications for this visit.       Allergies:  ALLERGIES:  No Known Allergies    Past Medical History  History reviewed. No pertinent past medical history.    Past Surgical History:  History reviewed. No pertinent surgical history.    Family History:  History reviewed. No pertinent family history.    Social History:       Patient's medications, allergies, past medical, surgical, and social history  were reviewed and updated as appropriate.    Review of Systems   Constitutional: Positive for activity change and fever. Negative for appetite change and irritability.   HENT: Positive for congestion, ear pain and rhinorrhea. Negative for ear discharge, facial swelling, mouth sores and trouble swallowing.    Eyes: Negative for discharge.   Respiratory: Negative for  Admit MD at bedside.    cough and wheezing.    Gastrointestinal: Negative for vomiting.   Skin: Negative for rash.   All other systems reviewed and are negative.      Objective     Visit Vitals  Pulse (!) 175   Temp (!) 102.5 °F (39.2 °C) (Temporal)   Resp 28   Wt 7.5 kg (16 lb 8.6 oz)   SpO2 97%       Physical Exam  Vitals reviewed.   Constitutional:       General: She is awake. She is not in acute distress.     Appearance: Normal appearance. She is well-developed and normal weight. She is not ill-appearing, toxic-appearing or diaphoretic.   HENT:      Head: Normocephalic and atraumatic.      Right Ear: Hearing, ear canal and external ear normal. No mastoid tenderness. Tympanic membrane is injected and bulging.      Left Ear: Hearing, ear canal and external ear normal. No mastoid tenderness. Tympanic membrane is injected and bulging.      Nose: Congestion and rhinorrhea present.      Mouth/Throat:      Lips: Pink. No lesions.      Mouth: Mucous membranes are moist. No oral lesions.      Pharynx: Oropharynx is clear. No posterior oropharyngeal erythema.      Tonsils: No tonsillar exudate.      Neck: Full passive range of motion without pain, normal range of motion and neck supple.   Eyes:      General: Visual tracking is normal. Lids are normal.      Conjunctiva/sclera: Conjunctivae normal.   Cardiovascular:      Rate and Rhythm: Normal rate.      Pulses: Normal pulses.   Pulmonary:      Effort: Pulmonary effort is normal. No accessory muscle usage, prolonged expiration or respiratory distress.      Breath sounds: Normal breath sounds and air entry. No stridor, decreased air movement or transmitted upper airway sounds. No wheezing.   Abdominal:      General: There is no distension.      Palpations: Abdomen is soft.      Tenderness: There is no abdominal tenderness.   Skin:     General: Skin is warm.      Capillary Refill: Capillary refill takes less than 2 seconds.      Findings: No rash.   Neurological:      Mental Status: She is alert  and oriented for age.   Psychiatric:         Mood and Affect: Mood normal.         Behavior: Behavior normal.         Labs:   Results for orders placed or performed in visit on 07/16/22   POCT SARS-COV-2 ANTIGEN   Result Value Ref Range    POCT SARS-COV-2 ANTIGEN Not Detected Not Detected    Internal Procedural Controls Acceptable Yes    POCT RSV RAPID   Result Value Ref Range    POCT RSV RAPID Negative Negative, Not Detected, Detected, N/A, Not Tested    Internal Procedural Controls Acceptable Yes    POCT INFLUENZA A/B   Result Value Ref Range    Rapid Influenza A Ag Negative Negative, Indeterminate    Rapid Influenza B Ag Negative Negative, Indeterminate    Internal Procedural Controls Acceptable Yes         Imaging:  No results found.       Assessment:   1. Acute ear infection, bilateral    2. Fever, unspecified fever cause           Plan:    Doubt urinary complaints no signs of acute abdomen no coughing exam findings reviewed with mother results reviewed with mother patient nontoxic mother states she will check child's temperature at home child was given Motrin and Tylenol here successfully  Since patient does have bilateral acute otitis media prescription for cefdinir provided with medication education mother is recommended she must follow with primary care may need ENT evaluation if symptoms worsen emergency room they left in stable condition  Over-the-counter fever reducer and hydration reviewed no signs of dehydration here  [place] within normal limits left in stable condition    No signs of acute abdomen    Heart rate elevated child is fussy here with obtaining of labs and vitals normal with mom  Instructions provided as documented in the AVS.    Miryam Jacobs NP  6:15 PM

## 2023-08-24 NOTE — ASSESSMENT & PLAN NOTE
Apparently not working at home  Check Contrast injection through G tube first for patency and position  If awry, consult [facility] (Dr Anderson placed in 6/2023)

## 2023-08-24 NOTE — H&P
Hospital Medicine History & Physical Note    Date of Service  8/24/2023    Primary Care Physician  Fidencio Christopher D.O.    Consultants  none    Specialist Names: none    Code Status  Full Code    Chief Complaint  Chief Complaint   Patient presents with    Abdominal Pain     Pt c/o abd/epigastric pain x 1 wk     Epigastric Pain    N/V    Other     Pt states possible G-tube complication/unable to flush        History of Presenting Illness  Mary Martinez is a 53 y.o. female SMA syndrome and chronic abdominal pain, G-tube placement in June 2023 for poor oral intake, GERD, RA on Enbrel who presents to the hospital for above chief complaint.  Patient states that she was just here couple weeks ago with abdominal pain that improved.  However over the last week she has had worsening abdominal pain in similar areas but is very severe in nature refractory to outpatient oxycodone and Tylenol.  She also endorses decreased bowel movements but ongoing flatus.  She does state that she has some mild nausea vomiting but was able to keep some food down earlier today.  On her admission in June she was discharged with home health and was supposed to get nocturnal tube feeds set up but this was never done.  Additionally over the last couple days she is had a hard time introducing any medications or liquids through her G-tube.  She was given no instructions on how to potentially keep the G-tube patent.    In the emergency room she was given IV fluids and IV morphine.  Currently her pain is reduced to a 5 out of 10.  I spoke with Dr. HILDA STANTON of the ER physician group in detail about the patient's case    I discussed the plan of care with patient.    Review of Systems  Review of Systems   Constitutional:  Positive for malaise/fatigue. Negative for chills and fever.   Respiratory:  Negative for cough.    Cardiovascular:  Negative for chest pain and palpitations.   Gastrointestinal:  Positive for abdominal pain, constipation, nausea and  vomiting.   Genitourinary:  Negative for dysuria, frequency, hematuria and urgency.       Past Medical History   has a past medical history of Acute renal failure (ARF) ([facility]), Allergy, Anemia, Anxiety, Arthritis, ASTHMA, Blood transfusion without reported diagnosis, Bowel habit changes, Bronchitis (last approx 2018), Chronic pain (04/24/2020), Dental disorder, Depression, GERD (gastroesophageal reflux disease), GIB (gastrointestinal bleeding), Head ache, Heart burn, Hiatus hernia syndrome, History of pancreatitis, Hypokalemia, Hyponatremia, Idiopathic chronic pancreatitis (HCC), Indigestion, Intractable nausea and vomiting, Kidney disease, Pain, Pneumonia (10/2019), PONV (postoperative nausea and vomiting), Psychiatric problem, Rheumatoid aortitis, Rheumatoid arthritis(714.0) (2003), SMAS (superior mesenteric artery syndrome) (HCC), and Substance abuse (HCC).    Surgical History   has a past surgical history that includes abdominal abscess drainage (9/27/2011); toe fusion (Right, 5/27/2015); bone spur excision (5/27/2015); hammertoe correction (5/27/2015); foot reconstruction rheumatic (Left, 7/29/2015); arthrodesis (Right, 5/6/2016); fusion, pip joint, toe (Right, 8/29/2016); bone graft (Right, 8/29/2016); irrigation & debridement ortho (Right, 9/11/2016); finger amputation (Right, 9/14/2016); finger arthroplasty (Right, 4/17/2017); finger arthroplasty (Right, 6/5/2017); finger amputation (6/5/2017); pr exploratory of abdomen (N/A, 10/4/2019); tonsillectomy (1982); primary c section (1991); repeat c section (1999); repeat c section (2005); repeat c section (2008); appendectomy (2011); nicholas by laparoscopy (9/27/2011); wrist exploration (Left, 1980's); colonoscopy (2018); dental extraction(s) (2014); pr endoscopic us exam, esoph (4/29/2020); gastroscopy-endo (4/29/2020); egd with asp/bx (4/29/2020); pr upper gi endoscopy,diagnosis (N/A, 9/9/2022); egd w/endoscopic ultrasound (N/A, 9/9/2022); pr place percut  gastrostomy tube (N/A, 6/12/2023); pr upper gi endoscopy,diagnosis (6/12/2023); and pr upper gi endoscopy,diagnosis (N/A, 8/16/2023).     Family History  family history includes Cancer in her mother; Heart Disease in her father and mother; Hypertension in her father and mother.   Family history reviewed with patient. There is no family history that is pertinent to the chief complaint.     Social History   reports that she has been smoking cigarettes. She has a 15.0 pack-year smoking history. She has never used smokeless tobacco. She reports that she does not drink alcohol and does not use drugs.    Allergies  Allergies   Allergen Reactions    Penicillins Anaphylaxis and Hives     Tolerates cephalosporins; reports throat swelling with PCN    Aripiprazole Nausea     Spasms, shaking      Nitrous Oxide Vomiting       Medications  Prior to Admission Medications   Prescriptions Last Dose Informant Patient Reported? Taking?   Naloxone (NARCAN) 4 MG/0.1ML Liquid PRN at PRN Historical No No   Sig: One spray in one nostril for overdose and call 911.   PARoxetine (PAXIL) 30 MG Tab 8/23/2023 at 2000 Patient Yes No   Sig: Take 60 mg by mouth every evening. 2 tablets = 60 mg.   QUEtiapine (SEROQUEL) 100 MG Tab 8/23/2023 at 2000 Patient No No   Sig: Take 1 Tablet by mouth every evening.   acetaminophen (TYLENOL) 325 MG Tab 8/24/2023 at 1100 Patient Yes Yes   Sig: Take 650 mg by mouth every 6 hours as needed for Mild Pain. 2 tablets = 650 mg.   esomeprazole (NEXIUM) 40 MG delayed-release capsule 8/24/2023 at 0900 Patient, Patient's Home Pharmacy No No   Sig: Take 1 Capsule by mouth daily for 30 days.   ferrous sulfate 325 (65 Fe) MG tablet 8/24/2023 at 0900 Patient, Patient's Home Pharmacy No No   Sig: Take 1 Tablet by mouth every day.   midodrine (PROAMATINE) 10 MG tablet 8/24/2023 at 0900 Patient, Patient's Home Pharmacy Yes No   Sig: Take 1 Tablet by mouth 3 times a day.   oxyCODONE-acetaminophen (PERCOCET) 7.5-325 MG per tablet  8/24/2023 at 0900 Patient No No   Sig: Take 1 Tablet by mouth every 6 hours as needed for Severe Pain for up to 30 days.   thiamine (THIAMINE) 100 MG tablet UNK. at UNK. Patient, Patient's Home Pharmacy No No   Sig: Take 1 Tablet by mouth every day for 90 days.      Facility-Administered Medications: None       Physical Exam  Temp:  [36.7 °C (98 °F)-37.6 °C (99.6 °F)] 37.6 °C (99.6 °F)  Pulse:  [81-89] 88  Resp:  [14-20] 16  BP: (88-97)/(53-68) 97/66  SpO2:  [96 %-98 %] 98 %  Blood Pressure: 93/68   Temperature: 36.7 °C (98 °F)   Pulse: 81   Respiration: 20   Pulse Oximetry: 97 %       Physical Exam  Vitals and nursing note reviewed.   Constitutional:       General: She is not in acute distress.     Appearance: She is well-developed. She is ill-appearing (chronically).      Comments: Thin, e/o subcutaneous fat loss and diffuse muscle wasting noted  Body mass index is 15.24 kg/m².     HENT:      Head: Normocephalic and atraumatic.      Mouth/Throat:      Pharynx: No oropharyngeal exudate.   Eyes:      General: No scleral icterus.     Pupils: Pupils are equal, round, and reactive to light.   Neck:      Thyroid: No thyromegaly.   Cardiovascular:      Rate and Rhythm: Normal rate and regular rhythm.      Heart sounds: Normal heart sounds. No murmur heard.  Pulmonary:      Effort: Pulmonary effort is normal. No respiratory distress.      Breath sounds: Normal breath sounds. No wheezing.   Abdominal:      General: There is no distension.      Palpations: Abdomen is soft.      Tenderness: There is abdominal tenderness (moderate to severe epigastric, some anticipatory flinching noted too). There is no guarding or rebound.      Comments: G tube in place, insertion site appears c/d/I  Hypoactive bowel sounds   Musculoskeletal:         General: No tenderness. Normal range of motion.      Cervical back: Normal range of motion and neck supple.      Comments: All 4 fingers of R hand amputated  Severe RA changes noted of the L  "hand   Skin:     General: Skin is warm and dry.      Coloration: Skin is pale.      Findings: No rash.   Neurological:      Mental Status: She is alert and oriented to person, place, and time.      Cranial Nerves: No cranial nerve deficit.         Laboratory:  Recent Labs     08/24/23  1416   WBC 8.0   RBC 3.01*   HEMOGLOBIN 9.7*   HEMATOCRIT 30.4*   .0*   MCH 32.2   MCHC 31.9*   RDW 58.3*   PLATELETCT 388   MPV 9.6     Recent Labs     08/24/23  1416   SODIUM 142   POTASSIUM 3.3*   CHLORIDE 114*   CO2 16*   GLUCOSE 106*   BUN 13   CREATININE 0.65   CALCIUM 8.3*     Recent Labs     08/24/23  1416   ALTSGPT 8   ASTSGOT 12   ALKPHOSPHAT 106*   TBILIRUBIN <0.2   LIPASE 167*   GLUCOSE 106*         No results for input(s): \"NTPROBNP\" in the last 72 hours.      No results for input(s): \"TROPONINT\" in the last 72 hours.    Imaging:  [id number] [id number]   Final Result      1.  Severe constipation.      DX-G.I. TUBE INJECTION, ANY TYPE    (Results Pending)       Abdominal x-ray shows severe constipation which was personally reviewed by me.    I personally reviewed the CBC, lipase, CMP.  Clinical interpretation and appropriate clinical applicability as noted in detail below assessment and plan.    Assessment/Plan:  Justification for Admission Status  I anticipate this patient will be obs appropriate.    Patient will need a Med/Surg bed on MEDICAL service .  The need is secondary to above.    * Abdominal pain- (present on admission)  Assessment & Plan  Acute on chronic  Severe constipation likely contributing  Just had EGD earlier this AM with GERD, continue PPI  Limit opiates  0.5 L peg-prep  Aggressive bowel regimen  IV fluids    Malfunction of gastrostomy tube (HCC)- (present on admission)  Assessment & Plan  Apparently not working at home  Check Contrast injection through G tube first for patency and position  If awry, consult [facility] (Dr Anderson placed in 6/2023)    GERD (gastroesophageal reflux disease)- (present " "on admission)  Assessment & Plan  ppi    Failure to thrive in adult- (present on admission)  Assessment & Plan  As noted above    Severe protein-calorie malnutrition (HCC)- (present on admission)  Assessment & Plan  Body mass index is 15.66 kg/m².  Noted on physical exam  Needs to get nocturnal TF\"s for supplementation  Needs boost  RD consult    SMAS (superior mesenteric artery syndrome) c/b feeding issues (HCC)- (present on admission)  Assessment & Plan  Pain not well controlled  No acute abdomen  G tube in place, appears to not be working well  Nocturnal TF\"s were supposed to be set up on discharge in 6/2023, but never done  SW/CM consult    GI bleeding, intractable N/V, hypovolemia, chronic pain syndrome- (present on admission)  Assessment & Plan  Takes APAP and oxycodone at home  Continue oral regimen, try to avoid IV opiates given severe constipation    Hypokalemia- (present on admission)  Assessment & Plan  Mild, IV replacement  Check daily bmp    Arthritis, rheumatoid (HCC)- (present on admission)  Assessment & Plan  On enbrel  Stable  Chronic pain issues    Macrocytic anemia- (present on admission)  Assessment & Plan  Above her baseline, likely an element of dehydration upon admisson  Ferrous sulfate replacement        VTE prophylaxis: SCDs/TEDs  "

## 2023-08-24 NOTE — ASSESSMENT & PLAN NOTE
Acute on chronic  Severe constipation likely contributing  Just had EGD earlier this AM with GERD, continue PPI  Limit opiates  0.5 L peg-prep  Aggressive bowel regimen  IV fluids

## 2023-08-25 VITALS
HEIGHT: 63 IN | OXYGEN SATURATION: 97 % | SYSTOLIC BLOOD PRESSURE: 103 MMHG | TEMPERATURE: 98.1 F | HEART RATE: 82 BPM | WEIGHT: 88.4 LBS | RESPIRATION RATE: 16 BRPM | BODY MASS INDEX: 15.66 KG/M2 | DIASTOLIC BLOOD PRESSURE: 68 MMHG

## 2023-08-25 PROBLEM — R10.9 ABDOMINAL PAIN: Status: RESOLVED | Noted: 2023-08-24 | Resolved: 2023-08-25

## 2023-08-25 PROBLEM — E87.6 HYPOKALEMIA: Status: RESOLVED | Noted: 2019-10-04 | Resolved: 2023-08-25

## 2023-08-25 PROBLEM — K92.2 GI BLEEDING: Status: RESOLVED | Noted: 2021-02-12 | Resolved: 2023-08-25

## 2023-08-25 PROBLEM — K94.23 MALFUNCTION OF GASTROSTOMY TUBE (HCC): Status: RESOLVED | Noted: 2023-08-24 | Resolved: 2023-08-25

## 2023-08-25 LAB
ANION GAP SERPL CALC-SCNC: 8 MMOL/L (ref 7–16)
BASOPHILS # BLD AUTO: 1 % (ref 0–1.8)
BASOPHILS # BLD: 0.08 K/UL (ref 0–0.12)
BUN SERPL-MCNC: 12 MG/DL (ref 8–22)
CALCIUM SERPL-MCNC: 7.9 MG/DL (ref 8.5–10.5)
CHLORIDE SERPL-SCNC: 116 MMOL/L (ref 96–112)
CO2 SERPL-SCNC: 14 MMOL/L (ref 20–33)
CREAT SERPL-MCNC: 0.59 MG/DL (ref 0.5–1.4)
EOSINOPHIL # BLD AUTO: 0.16 K/UL (ref 0–0.51)
EOSINOPHIL NFR BLD: 1.9 % (ref 0–6.9)
ERYTHROCYTE [DISTWIDTH] IN BLOOD BY AUTOMATED COUNT: 58.3 FL (ref 35.9–50)
GFR SERPLBLD CREATININE-BSD FMLA CKD-EPI: 109 ML/MIN/1.73 M 2
GLUCOSE SERPL-MCNC: 93 MG/DL (ref 65–99)
HCT VFR BLD AUTO: 26.3 % (ref 37–47)
HGB BLD-MCNC: 8.3 G/DL (ref 12–16)
IMM GRANULOCYTES # BLD AUTO: 0.02 K/UL (ref 0–0.11)
IMM GRANULOCYTES NFR BLD AUTO: 0.2 % (ref 0–0.9)
LYMPHOCYTES # BLD AUTO: 3.1 K/UL (ref 1–4.8)
LYMPHOCYTES NFR BLD: 37 % (ref 22–41)
MCH RBC QN AUTO: 32 PG (ref 27–33)
MCHC RBC AUTO-ENTMCNC: 31.6 G/DL (ref 32.2–35.5)
MCV RBC AUTO: 101.5 FL (ref 81.4–97.8)
MONOCYTES # BLD AUTO: 0.51 K/UL (ref 0–0.85)
MONOCYTES NFR BLD AUTO: 6.1 % (ref 0–13.4)
NEUTROPHILS # BLD AUTO: 4.5 K/UL (ref 1.82–7.42)
NEUTROPHILS NFR BLD: 53.8 % (ref 44–72)
NRBC # BLD AUTO: 0 K/UL
NRBC BLD-RTO: 0 /100 WBC (ref 0–0.2)
PLATELET # BLD AUTO: 369 K/UL (ref 164–446)
PMV BLD AUTO: 9.9 FL (ref 9–12.9)
POTASSIUM SERPL-SCNC: 4 MMOL/L (ref 3.6–5.5)
RBC # BLD AUTO: 2.59 M/UL (ref 4.2–5.4)
SODIUM SERPL-SCNC: 138 MMOL/L (ref 135–145)
WBC # BLD AUTO: 8.4 K/UL (ref 4.8–10.8)

## 2023-08-25 PROCEDURE — A9270 NON-COVERED ITEM OR SERVICE: HCPCS | Mod: UD | Performed by: INTERNAL MEDICINE

## 2023-08-25 PROCEDURE — 700111 HCHG RX REV CODE 636 W/ 250 OVERRIDE (IP): Mod: UD | Performed by: NURSE PRACTITIONER

## 2023-08-25 PROCEDURE — 80048 BASIC METABOLIC PNL TOTAL CA: CPT

## 2023-08-25 PROCEDURE — 96376 TX/PRO/DX INJ SAME DRUG ADON: CPT

## 2023-08-25 PROCEDURE — 700102 HCHG RX REV CODE 250 W/ 637 OVERRIDE(OP): Mod: UD | Performed by: INTERNAL MEDICINE

## 2023-08-25 PROCEDURE — 85025 COMPLETE CBC W/AUTO DIFF WBC: CPT

## 2023-08-25 PROCEDURE — G0378 HOSPITAL OBSERVATION PER HR: HCPCS

## 2023-08-25 PROCEDURE — 700105 HCHG RX REV CODE 258: Mod: UD | Performed by: INTERNAL MEDICINE

## 2023-08-25 PROCEDURE — 99239 HOSP IP/OBS DSCHRG MGMT >30: CPT | Performed by: HOSPITALIST

## 2023-08-25 RX ORDER — AMOXICILLIN 250 MG
2 CAPSULE ORAL 2 TIMES DAILY
Qty: 30 TABLET | Refills: 0 | Status: SHIPPED | OUTPATIENT
Start: 2023-08-25 | End: 2023-09-14

## 2023-08-25 RX ADMIN — SENNOSIDES AND DOCUSATE SODIUM 2 TABLET: 50; 8.6 TABLET ORAL at 05:21

## 2023-08-25 RX ADMIN — POTASSIUM CHLORIDE 20 MEQ: 20 TABLET, EXTENDED RELEASE ORAL at 05:20

## 2023-08-25 RX ADMIN — FERROUS SULFATE TAB 325 MG (65 MG ELEMENTAL FE) 325 MG: 325 (65 FE) TAB at 05:20

## 2023-08-25 RX ADMIN — OMEPRAZOLE 20 MG: 20 CAPSULE, DELAYED RELEASE ORAL at 05:20

## 2023-08-25 RX ADMIN — KETOROLAC TROMETHAMINE 15 MG: 30 INJECTION, SOLUTION INTRAMUSCULAR; INTRAVENOUS at 05:21

## 2023-08-25 RX ADMIN — Medication 100 MG: at 05:20

## 2023-08-25 RX ADMIN — MIDODRINE HYDROCHLORIDE 10 MG: 5 TABLET ORAL at 05:20

## 2023-08-25 RX ADMIN — SODIUM CHLORIDE 1000 ML: 9 INJECTION, SOLUTION INTRAVENOUS at 02:14

## 2023-08-25 NOTE — FACE TO FACE
Face to Face Supporting Documentation - Home Health    The encounter with this patient was in whole or in part the primary reason for home health admission.    Date of encounter:   Patient:                    MRN:                       YOB: 2021  Mary Martinez  [id number]  1970     Home health to see patient for:  Skilled Nursing care for assessment, interventions & education    Skilled need for:  Surgical Aftercare peg    Skilled nursing interventions to include:  Comment: medications,  peg tube education    Homebound status evidenced by:  Need the aid of supportive devices such as crutches, canes, wheelchairs or walkers. Leaving home requires a considerable and taxing effort. There is a normal inability to leave the home.    Community Physician to provide follow up care: Fidencio Christopher D.O.     Optional Interventions? No      I certify the face to face encounter for this home health care referral meets the CMS requirements and the encounter/clinical assessment with the patient was, in whole, or in part, for the medical condition(s) listed above, which is the primary reason for home health care. Based on my clinical findings: the service(s) are medically necessary, support the need for home health care, and the homebound criteria are met.  I certify that this patient has had a face to face encounter by myself.  Seth Torres M.D. - NPI: [id number]

## 2023-08-25 NOTE — DISCHARGE PLANNING
Received [facility] request,spoke with at bedside and patient updated CM that she is on service with [facility]. Choice filled out with verbal consent and signed by patient. Choice form faxed to Christopher CULVER. Message sent to Christopher CULVER with request to scan to [employer]. Called [facility] and spoke with Helen and verified no new referral needed as patient is OBS. DR. Torres updated.

## 2023-08-25 NOTE — PROGRESS NOTES
4 Eyes Skin Assessment Completed by Luda RN and LEANNE Allen.    Head WDL  Ears WDL  Nose WDL  Mouth WDL  Neck WDL  Breast/Chest WDL  Shoulder Blades WDL  Spine WDL  (R) Arm/Elbow/Hand Scar  (L) Arm/Elbow/Hand Scar  Abdomen Scar and Abrasion, G tube  Groin WDL  Scrotum/Coccyx/Buttocks WDL  (R) Leg WDL  (L) Leg WDL  (R) Heel/Foot/Toe Redness and Ulcer(s)  (L) Heel/Foot/Toe WDL          Devices In Places Blood Pressure Cuff and Pulse Ox      Interventions In Place Pillows    Possible Skin Injury Yes    Pictures Uploaded Into Epic Yes  Wound Consult Placed N/A  RN Wound Prevention Protocol Ordered No

## 2023-08-25 NOTE — DISCHARGE PLANNING
Care Transition Team Assessment    Spoke with patient at bedside and verified all information. Lives with spouse and 2 adult children in single story home. PCP is at [facility]. Uses Cane, Walker and W/C to get around Bathroom is complete for ADA disability. On service with [facility].  Has Medicaid FFS insurance. Mother will be ride @ D/C. Anticipate no needs @ present time.    Information Source  Orientation Level: Oriented X4  Information Given By: Patient    Readmission Evaluation  Is this a readmission?: No    Interdisciplinary Discharge Planning  Primary Care Physician: [facility]  Lives with - Patient's Self Care Capacity: Spouse, Adult Children  Patient or legal guardian wants to designate a caregiver: No  Support Systems: Children, Spouse / Significant Other  Housing / Facility: 1 Story House  Do You Take your Prescribed Medications Regularly: Yes  Able to Return to Previous ADL's: Yes  Mobility Issues: Yes  Prior Services: Skilled Home Health Services  Patient Prefers to be Discharged to:: Home  Assistance Needed: No  Durable Medical Equipment: Walker, Other - Specify (W/C, Cane.)    Discharge Preparedness  What are your discharge supports?: Child, Spouse  Prior Functional Level: Uses Walker, Uses Wheelchair, Uses Cane    Functional Assesment  Prior Functional Level: Uses Walker, Uses Wheelchair, Uses Cane    Finances  Prescription Coverage: Yes    Discharge Risks or Barriers  Patient risk factors: Complex medical needs    Anticipated Discharge Information  Discharge Disposition: Discharged to home/self care (01)  Discharge Address: [address] [place] [address]  Discharge Contact Phone Number: [phone]

## 2023-08-25 NOTE — CARE PLAN
The patient is Watcher - Medium risk of patient condition declining or worsening    Shift Goals  Clinical Goals: pain control, bowel movement  Patient Goals: comfort, sleep  Family Goals: n/a    Progress made toward(s) clinical / shift goals:  Pain management progressing through medication (see MAR), rest, positioning, and distraction.  Patient educated on plan of care, medications, side effects, non-pharmalogical pain control, and safety/fall precautions.  Skin integrity is maintained and patient remains free from falls.      Problem: Pain - Standard  Goal: Alleviation of pain or a reduction in pain to the patient’s comfort goal  Description: Target End Date:  Prior to discharge or change in level of care    Document on Vitals flowsheet    1.  Document pain using the appropriate pain scale per order or unit policy  2.  Educate and implement non-pharmacologic comfort measures (i.e. relaxation, distraction, massage, cold/heat therapy, etc.)  3.  Pain management medications as ordered  4.  Reassess pain after pain med administration per policy  5.  If opiods administered assess patient's response to pain medication is appropriate per POSS sedation scale  6.  Follow pain management plan developed in collaboration with patient and interdisciplinary team (including palliative care or pain specialists if applicable)  Outcome: Progressing     Problem: Knowledge Deficit - Standard  Goal: Patient and family/care givers will demonstrate understanding of plan of care, disease process/condition, diagnostic tests and medications  Description: Target End Date:  1-3 days or as soon as patient condition allows    Document in Patient Education    1.  Patient and family/caregiver oriented to unit, equipment, visitation policy and means for communicating concern  2.  Complete/review Learning Assessment  3.  Assess knowledge level of disease process/condition, treatment plan, diagnostic tests and medications  4.  Explain disease  process/condition, treatment plan, diagnostic tests and medications  Outcome: Progressing     Problem: Skin Integrity  Goal: Skin integrity is maintained or improved  Description: Target End Date:  Prior to discharge or change in level of care    Document interventions on Skin Risk/Zan flowsheet groups and corresponding LDA    1.  Assess and monitor skin integrity, appearance and/or temperature  2.  Assess risk factors for impaired skin integrity and/or pressures ulcers  3.  Implement precautions to protect skin integrity in collaboration with interdisciplinary team  4.  Implement pressure ulcer prevention protocol if at risk for skin breakdown  5.  Confirm wound care consult if at risk for skin breakdown  6.  Ensure patient use of pressure relieving devices  (Low air loss bed, waffle overlay, heel protectors, ROHO cushion, etc)  Outcome: Progressing     Problem: Fall Risk  Goal: Patient will remain free from falls  Description: Target End Date:  Prior to discharge or change in level of care    Document interventions on the Rin Julio Fall Risk Assessment    1.  Assess for fall risk factors  2.  Implement fall precautions  Outcome: Progressing

## 2023-08-25 NOTE — PROGRESS NOTES
Patient arrived to floor. Assumed care at 1759. Assessment complete, A&Ox4. Son, Lobo at bedside.   Admission record complete,  Pt oriented to room, educated on call light/extension/phone system, white board updated. Fall assessment complete, patient educated to call for assistance, pt verbalizes understanding. Pt c/o 9/10 pain; medicated per MAR;no additional needs at this time. Questions and concerns addressed. Call light, phone, and personal belongings within reach.

## 2023-08-25 NOTE — PROGRESS NOTES
Assumed care of patient and heard report from Doris PERDOMO.  Admit profile and assessment completed, patient is A&Ox 4, reports 10/10 pain and is medicated per MAR, on room air, NADN.  Patient updated on plan of care and all needs addressed at this time.  Upper bed rails in place, bed in lowest locked setting, non slip socks on, belongings and call light in reach, falls precaution and safety education reinforced, patient verbalized understanding.    Solaraze Counseling:  I discussed with the patient the risks of Solaraze including but not limited to erythema, scaling, itching, weeping, crusting, and pain.

## 2023-08-25 NOTE — CARE PLAN
The patient is Watcher - Medium risk of patient condition declining or worsening      Progress made toward(s) clinical / shift goals:    Problem: Pain - Standard  Goal: Alleviation of pain or a reduction in pain to the patient’s comfort goal  8/24/2023 1811 by Doris Vu, R.N.  Outcome: Not Met       Problem: Knowledge Deficit - Standard  Goal: Patient and family/care givers will demonstrate understanding of plan of care, disease process/condition, diagnostic tests and medications  8/24/2023 1811 by Doris Vu, R.N.  Outcome: Progressing       Problem: Skin Integrity  Goal: Skin integrity is maintained or improved  Outcome: Progressing       Patient is not progressing towards the following goals:      Progress made toward(s) clinical / shift goals:    Problem: Pain - Standard  Goal: Alleviation of pain or a reduction in pain to the patient’s comfort goal  8/24/2023 1811 by Doris Vu, R.N.  Outcome: Not Met

## 2023-08-25 NOTE — DISCHARGE SUMMARY
Discharge Summary    CHIEF COMPLAINT ON ADMISSION  Chief Complaint   Patient presents with    Abdominal Pain     Pt c/o abd/epigastric pain x 1 wk     Epigastric Pain    N/V    Other     Pt states possible G-tube complication/unable to flush        Reason for Admission  EMS     Admission Date  8/24/2023    CODE STATUS  Prior    HPI & HOSPITAL COURSE  As per nupur Hernandez Meera Martinez is a 53 y.o. female SMA syndrome and chronic abdominal pain, G-tube placement in June 2023 for poor oral intake, GERD, RA on Enbrel who presents to the hospital for above chief complaint.  Patient states that she was just here couple weeks ago with abdominal pain that improved.  However over the last week she has had worsening abdominal pain in similar areas but is very severe in nature refractory to outpatient oxycodone and Tylenol.  She also endorses decreased bowel movements but ongoing flatus.  She does state that she has some mild nausea vomiting but was able to keep some food down earlier today.  On her admission in June she was discharged with home health and was supposed to get nocturnal tube feeds set up but this was never done.  Additionally over the last couple days she is had a hard time introducing any medications or liquids through her G-tube.  She was given no instructions on how to potentially keep the G-tube patent.    Patient underwent a radiological study that shows that the G-tube is patent and functioning as it should.  Patient was given aggressive bowel care and attempt to give a bowel movement.  Patient had multiple bowel movements during her hospital stay with [facility].  Patient already has home health established.  Patient cleared for discharge home with a stool softener and home health    Therefore, she is discharged in good and stable condition to home with close outpatient follow-up.    The patient recovered much more quickly than anticipated on admission.    Discharge Date  8/25/2023    FOLLOW UP  ITEMS POST DISCHARGE      DISCHARGE DIAGNOSES  Principal Problem (Resolved):    Abdominal pain (POA: Yes)  Active Problems:    Macrocytic anemia (POA: Yes)    Arthritis, rheumatoid (HCC) (POA: Yes)    SMAS (superior mesenteric artery syndrome) c/b feeding issues (HCC) (POA: Yes)    Severe protein-calorie malnutrition (HCC) (POA: Yes)    Failure to thrive in adult (POA: Yes)    GERD (gastroesophageal reflux disease) (POA: Yes)  Resolved Problems:    Hypokalemia (POA: Yes)    GI bleeding, intractable N/V, hypovolemia, chronic pain syndrome (POA: Yes)    Malfunction of gastrostomy tube (HCC) (POA: Yes)      FOLLOW UP  Future Appointments   Date Time Provider Department Center   9/1/2023  1:00 PM [facility]     No follow-up provider specified.    MEDICATIONS ON DISCHARGE     Medication List        START taking these medications        Instructions   senna-docusate 8.6-50 MG Tabs  Commonly known as: Pericolace Or Senokot S   Take 2 Tablets by mouth 2 times a day.  Dose: 2 Tablet            CONTINUE taking these medications        Instructions   acetaminophen 325 MG Tabs  Commonly known as: Tylenol   Take 650 mg by mouth every 6 hours as needed for Mild Pain. 2 tablets = 650 mg.  Dose: 650 mg     esomeprazole 40 MG delayed-release capsule  Commonly known as: NexIUM   Take 1 Capsule by mouth daily for 30 days.  Dose: 40 mg     FeroSul 325 (65 Fe) MG tablet  Generic drug: ferrous sulfate   Take 1 Tablet by mouth every day.  Dose: 325 mg     midodrine 10 MG tablet  Commonly known as: Proamatine   Take 1 Tablet by mouth 3 times a day.  Dose: 1 Tablet     Naloxone 4 MG/0.1ML Liqd  Commonly known as: Narcan   One spray in one nostril for overdose and call 911.     oxyCODONE-acetaminophen 7.5-325 MG per tablet  Start taking on: August 26, 2023  Commonly known as: Percocet   Take 1 Tablet by mouth every 6 hours as needed for Severe Pain for up to 30 days.  Dose: 1 Tablet     PARoxetine 30 MG  Tabs  Commonly known as: Paxil   Take 60 mg by mouth every evening. 2 tablets = 60 mg.  Dose: 60 mg     QUEtiapine 100 MG Tabs  Commonly known as: SEROquel   Take 1 Tablet by mouth every evening.  Dose: 100 mg     thiamine 100 MG tablet  Commonly known as: Thiamine   Take 1 Tablet by mouth every day for 90 days.  Dose: 100 mg              Allergies  Allergies   Allergen Reactions    Penicillins Anaphylaxis and Hives     Tolerates cephalosporins; reports throat swelling with PCN    Aripiprazole Nausea     Spasms, shaking      Nitrous Oxide Vomiting       DIET  No orders of the defined types were placed in this encounter.      ACTIVITY  As tolerated.  Weight bearing as tolerated    CONSULTATIONS      PROCEDURES      LABORATORY  Lab Results   Component Value Date    SODIUM 138 08/25/2023    POTASSIUM 4.0 08/25/2023    CHLORIDE 116 (H) 08/25/2023    CO2 14 (L) 08/25/2023    GLUCOSE 93 08/25/2023    BUN 12 08/25/2023    CREATININE 0.59 08/25/2023    CREATININE 0.7 11/29/2008        Lab Results   Component Value Date    WBC 8.4 08/25/2023    HEMOGLOBIN 8.3 (L) 08/25/2023    HEMATOCRIT 26.3 (L) 08/25/2023    PLATELETCT 369 08/25/2023        Total time of the discharge process exceeds 38 minutes.

## 2023-08-29 ENCOUNTER — PATIENT OUTREACH (OUTPATIENT)
Dept: HEALTH INFORMATION MANAGEMENT | Facility: OTHER | Age: 53
End: 2023-08-29
Payer: MEDICAID

## 2023-08-29 SDOH — ECONOMIC STABILITY: HOUSING INSECURITY
IN THE LAST 12 MONTHS, WAS THERE A TIME WHEN YOU DID NOT HAVE A STEADY PLACE TO SLEEP OR SLEPT IN A SHELTER (INCLUDING NOW)?: NO

## 2023-08-29 SDOH — ECONOMIC STABILITY: FOOD INSECURITY: WITHIN THE PAST 12 MONTHS, THE FOOD YOU BOUGHT JUST DIDN'T LAST AND YOU DIDN'T HAVE MONEY TO GET MORE.: NEVER TRUE

## 2023-08-29 SDOH — ECONOMIC STABILITY: INCOME INSECURITY: IN THE PAST 12 MONTHS, HAS THE ELECTRIC, GAS, OIL, OR WATER COMPANY THREATENED TO SHUT OFF SERVICE IN YOUR HOME?: NO

## 2023-08-29 SDOH — ECONOMIC STABILITY: INCOME INSECURITY: IN THE LAST 12 MONTHS, WAS THERE A TIME WHEN YOU WERE NOT ABLE TO PAY THE MORTGAGE OR RENT ON TIME?: NO

## 2023-08-29 SDOH — ECONOMIC STABILITY: FOOD INSECURITY: WITHIN THE PAST 12 MONTHS, YOU WORRIED THAT YOUR FOOD WOULD RUN OUT BEFORE YOU GOT MONEY TO BUY MORE.: NEVER TRUE

## 2023-08-29 SDOH — ECONOMIC STABILITY: INCOME INSECURITY: HOW HARD IS IT FOR YOU TO PAY FOR THE VERY BASICS LIKE FOOD, HOUSING, MEDICAL CARE, AND HEATING?: NOT HARD AT ALL

## 2023-08-29 SDOH — ECONOMIC STABILITY: HOUSING INSECURITY: IN THE LAST 12 MONTHS, HOW MANY PLACES HAVE YOU LIVED?: 1

## 2023-08-29 NOTE — PROGRESS NOTES
DELIA Rodriguez contacted pt post discharge to offer Community Care Management services. Pt states no barriers with housing, transportation, finance or food. Pt states she has follow up appointment scheduled for 09/01/2023. CCM contact information was provided and CHW encouraged pt to call if anything was needed in the future. Due to pt stating no needs CHW will not continue to follow at this time.     Community Health Worker Intake    Identified barriers to none.  Resources provided to N/A.  Contact information provided to Mary Martinez.  Has PCP appointment scheduled for 09/01/2023  Outpatient assessment completed.  Did the patient receive medications post discharge: Yes    Plan:  Due to pt stating no needs, DELIA Rodriguez will not continue to follow at this time.

## 2023-09-01 ENCOUNTER — APPOINTMENT (OUTPATIENT)
Dept: MEDICAL GROUP | Facility: CLINIC | Age: 53
End: 2023-09-01
Attending: PHYSICAL MEDICINE & REHABILITATION
Payer: MEDICAID

## 2023-09-14 ENCOUNTER — APPOINTMENT (OUTPATIENT)
Dept: RADIOLOGY | Facility: MEDICAL CENTER | Age: 53
End: 2023-09-14
Attending: EMERGENCY MEDICINE
Payer: MEDICAID

## 2023-09-14 ENCOUNTER — PHARMACY VISIT (OUTPATIENT)
Dept: PHARMACY | Facility: MEDICAL CENTER | Age: 53
End: 2023-09-14
Payer: COMMERCIAL

## 2023-09-14 ENCOUNTER — HOSPITAL ENCOUNTER (EMERGENCY)
Facility: MEDICAL CENTER | Age: 53
End: 2023-09-14
Attending: EMERGENCY MEDICINE
Payer: MEDICAID

## 2023-09-14 VITALS
OXYGEN SATURATION: 97 % | WEIGHT: 86.42 LBS | HEART RATE: 75 BPM | RESPIRATION RATE: 14 BRPM | DIASTOLIC BLOOD PRESSURE: 67 MMHG | TEMPERATURE: 98.7 F | SYSTOLIC BLOOD PRESSURE: 101 MMHG | BODY MASS INDEX: 15.31 KG/M2 | HEIGHT: 63 IN

## 2023-09-14 DIAGNOSIS — L08.9 SKIN INFECTION AT GASTROSTOMY TUBE SITE (HCC): ICD-10-CM

## 2023-09-14 DIAGNOSIS — E88.89 KETOSIS (HCC): ICD-10-CM

## 2023-09-14 DIAGNOSIS — K94.22 SKIN INFECTION AT GASTROSTOMY TUBE SITE (HCC): ICD-10-CM

## 2023-09-14 LAB
ALBUMIN SERPL BCP-MCNC: 4 G/DL (ref 3.2–4.9)
ALBUMIN/GLOB SERPL: 1 G/DL
ALP SERPL-CCNC: 132 U/L (ref 30–99)
ALT SERPL-CCNC: 10 U/L (ref 2–50)
ANION GAP SERPL CALC-SCNC: 13 MMOL/L (ref 7–16)
AST SERPL-CCNC: 14 U/L (ref 12–45)
BASOPHILS # BLD AUTO: 0.8 % (ref 0–1.8)
BASOPHILS # BLD: 0.08 K/UL (ref 0–0.12)
BILIRUB SERPL-MCNC: <0.2 MG/DL (ref 0.1–1.5)
BUN SERPL-MCNC: 23 MG/DL (ref 8–22)
CALCIUM ALBUM COR SERPL-MCNC: 8.8 MG/DL (ref 8.5–10.5)
CALCIUM SERPL-MCNC: 8.8 MG/DL (ref 8.5–10.5)
CHLORIDE SERPL-SCNC: 113 MMOL/L (ref 96–112)
CO2 SERPL-SCNC: 8 MMOL/L (ref 20–33)
CREAT SERPL-MCNC: 0.77 MG/DL (ref 0.5–1.4)
EOSINOPHIL # BLD AUTO: 0.2 K/UL (ref 0–0.51)
EOSINOPHIL NFR BLD: 2 % (ref 0–6.9)
ERYTHROCYTE [DISTWIDTH] IN BLOOD BY AUTOMATED COUNT: 63.1 FL (ref 35.9–50)
GFR SERPLBLD CREATININE-BSD FMLA CKD-EPI: 93 ML/MIN/1.73 M 2
GLOBULIN SER CALC-MCNC: 4 G/DL (ref 1.9–3.5)
GLUCOSE SERPL-MCNC: 108 MG/DL (ref 65–99)
GRAM STN SPEC: NORMAL
HCT VFR BLD AUTO: 37.2 % (ref 37–47)
HGB BLD-MCNC: 11.7 G/DL (ref 12–16)
IMM GRANULOCYTES # BLD AUTO: 0.06 K/UL (ref 0–0.11)
IMM GRANULOCYTES NFR BLD AUTO: 0.6 % (ref 0–0.9)
LACTATE SERPL-SCNC: 1.3 MMOL/L (ref 0.5–2)
LYMPHOCYTES # BLD AUTO: 2.03 K/UL (ref 1–4.8)
LYMPHOCYTES NFR BLD: 19.9 % (ref 22–41)
MCH RBC QN AUTO: 32 PG (ref 27–33)
MCHC RBC AUTO-ENTMCNC: 31.5 G/DL (ref 32.2–35.5)
MCV RBC AUTO: 101.6 FL (ref 81.4–97.8)
MONOCYTES # BLD AUTO: 0.8 K/UL (ref 0–0.85)
MONOCYTES NFR BLD AUTO: 7.9 % (ref 0–13.4)
NEUTROPHILS # BLD AUTO: 7.01 K/UL (ref 1.82–7.42)
NEUTROPHILS NFR BLD: 68.8 % (ref 44–72)
NRBC # BLD AUTO: 0 K/UL
NRBC BLD-RTO: 0 /100 WBC (ref 0–0.2)
PLATELET # BLD AUTO: 563 K/UL (ref 164–446)
PMV BLD AUTO: 8.9 FL (ref 9–12.9)
POTASSIUM SERPL-SCNC: 3.8 MMOL/L (ref 3.6–5.5)
PROT SERPL-MCNC: 8 G/DL (ref 6–8.2)
RBC # BLD AUTO: 3.66 M/UL (ref 4.2–5.4)
SIGNIFICANT IND 70042: NORMAL
SITE SITE: NORMAL
SODIUM SERPL-SCNC: 134 MMOL/L (ref 135–145)
SOURCE SOURCE: NORMAL
WBC # BLD AUTO: 10.2 K/UL (ref 4.8–10.8)

## 2023-09-14 PROCEDURE — 87040 BLOOD CULTURE FOR BACTERIA: CPT | Mod: 91,XU

## 2023-09-14 PROCEDURE — 99284 EMERGENCY DEPT VISIT MOD MDM: CPT

## 2023-09-14 PROCEDURE — 83605 ASSAY OF LACTIC ACID: CPT

## 2023-09-14 PROCEDURE — RXMED WILLOW AMBULATORY MEDICATION CHARGE: Performed by: EMERGENCY MEDICINE

## 2023-09-14 PROCEDURE — 96375 TX/PRO/DX INJ NEW DRUG ADDON: CPT

## 2023-09-14 PROCEDURE — 80053 COMPREHEN METABOLIC PANEL: CPT

## 2023-09-14 PROCEDURE — 87186 SC STD MICRODIL/AGAR DIL: CPT | Mod: 91

## 2023-09-14 PROCEDURE — 700111 HCHG RX REV CODE 636 W/ 250 OVERRIDE (IP): Mod: UD | Performed by: EMERGENCY MEDICINE

## 2023-09-14 PROCEDURE — 36415 COLL VENOUS BLD VENIPUNCTURE: CPT

## 2023-09-14 PROCEDURE — 87205 SMEAR GRAM STAIN: CPT

## 2023-09-14 PROCEDURE — 87070 CULTURE OTHR SPECIMN AEROBIC: CPT

## 2023-09-14 PROCEDURE — 85025 COMPLETE CBC W/AUTO DIFF WBC: CPT

## 2023-09-14 PROCEDURE — 700105 HCHG RX REV CODE 258: Mod: UD | Performed by: EMERGENCY MEDICINE

## 2023-09-14 PROCEDURE — 96365 THER/PROPH/DIAG IV INF INIT: CPT

## 2023-09-14 PROCEDURE — 87077 CULTURE AEROBIC IDENTIFY: CPT

## 2023-09-14 RX ORDER — CIPROFLOXACIN 500 MG/1
500 TABLET, FILM COATED ORAL 2 TIMES DAILY
Qty: 20 TABLET | Refills: 0 | Status: ACTIVE | OUTPATIENT
Start: 2023-09-14 | End: 2023-09-21

## 2023-09-14 RX ORDER — MORPHINE SULFATE 4 MG/ML
4 INJECTION INTRAVENOUS ONCE
Status: COMPLETED | OUTPATIENT
Start: 2023-09-14 | End: 2023-09-14

## 2023-09-14 RX ORDER — METRONIDAZOLE 500 MG/1
500 TABLET ORAL 2 TIMES DAILY
Qty: 14 TABLET | Refills: 0 | Status: ACTIVE | OUTPATIENT
Start: 2023-09-14 | End: 2023-09-21

## 2023-09-14 RX ORDER — METRONIDAZOLE 500 MG/100ML
500 INJECTION, SOLUTION INTRAVENOUS ONCE
Status: COMPLETED | OUTPATIENT
Start: 2023-09-14 | End: 2023-09-14

## 2023-09-14 RX ORDER — CEFTRIAXONE 1 G/1
1000 INJECTION, POWDER, FOR SOLUTION INTRAMUSCULAR; INTRAVENOUS ONCE
Status: COMPLETED | OUTPATIENT
Start: 2023-09-14 | End: 2023-09-14

## 2023-09-14 RX ORDER — DEXTROSE, SODIUM CHLORIDE, SODIUM LACTATE, POTASSIUM CHLORIDE, AND CALCIUM CHLORIDE 5; .6; .31; .03; .02 G/100ML; G/100ML; G/100ML; G/100ML; G/100ML
INJECTION, SOLUTION INTRAVENOUS ONCE
Status: COMPLETED | OUTPATIENT
Start: 2023-09-14 | End: 2023-09-14

## 2023-09-14 RX ORDER — ONDANSETRON 2 MG/ML
4 INJECTION INTRAMUSCULAR; INTRAVENOUS ONCE
Status: COMPLETED | OUTPATIENT
Start: 2023-09-14 | End: 2023-09-14

## 2023-09-14 RX ORDER — METRONIDAZOLE 500 MG/1
500 TABLET ORAL 3 TIMES DAILY
Qty: 21 TABLET | Refills: 0 | Status: SHIPPED | OUTPATIENT
Start: 2023-09-14 | End: 2023-09-14 | Stop reason: SDUPTHER

## 2023-09-14 RX ORDER — AMOXICILLIN 250 MG
1 CAPSULE ORAL 2 TIMES DAILY
Status: SHIPPED | COMMUNITY
End: 2024-02-07

## 2023-09-14 RX ADMIN — CEFTRIAXONE SODIUM 1000 MG: 1 INJECTION, POWDER, FOR SOLUTION INTRAMUSCULAR; INTRAVENOUS at 13:17

## 2023-09-14 RX ADMIN — METRONIDAZOLE 500 MG: 500 INJECTION, SOLUTION INTRAVENOUS at 13:17

## 2023-09-14 RX ADMIN — ONDANSETRON 4 MG: 2 INJECTION INTRAMUSCULAR; INTRAVENOUS at 13:22

## 2023-09-14 RX ADMIN — SODIUM CHLORIDE, SODIUM LACTATE, POTASSIUM CHLORIDE, CALCIUM CHLORIDE AND DEXTROSE MONOHYDRATE: 5; 600; 310; 30; 20 INJECTION, SOLUTION INTRAVENOUS at 13:45

## 2023-09-14 RX ADMIN — MORPHINE SULFATE 4 MG: 4 INJECTION INTRAVENOUS at 13:22

## 2023-09-14 ASSESSMENT — FIBROSIS 4 INDEX: FIB4 SCORE: 0.59

## 2023-09-14 NOTE — ED NOTES
Med Rec complete per patient   Allergies reviewed  No oral antibiotics in the last 30 days  Preferred pharmacy: [facility] on [address]    Pt states she stopped taking Thiamine and Ferrous Sulfate due to feeling nauseated

## 2023-09-14 NOTE — ED TRIAGE NOTES
Pt ambulated to triage with   Chief Complaint   Patient presents with    Sent by MD     Peg tube and being sen by GI And having problems and dx with infection and supposed to start abx but pharmacy and unable to fill and unable to know when it will be available, suppose to start Moxifloxacin; reports constant pain and throbbing.  Not drainage and peg site getting bigger with movement of peg tube now.        Sepsis 3, Protocol ordered.  Pt Informed regarding triage process and verbalized understanding to inform triage tech or RN for any changes in condition. Placed in lobby.

## 2023-09-14 NOTE — ED NOTES
Pt AOx4 and ready for education. All discharge instructions given to pt as well as Rx for Cipro and Flagyl. Pt verbalized understanding of all discharge instructions. All lines removed prior to discharge. All questions answered. Pt to lobby via ambulation.

## 2023-09-14 NOTE — ED PROVIDER NOTES
ED Provider Note    CHIEF COMPLAINT  Chief Complaint   Patient presents with    Sent by MD     Peg tube and being sen by GI And having problems and dx with infection and supposed to start abx but pharmacy and unable to fill and unable to know when it will be available, suppose to start Moxifloxacin; reports constant pain and throbbing.  Not drainage and peg site getting bigger with movement of peg tube now.          EXTERNAL RECORDS REVIEWED  Inpatient Notes the patient was admitted to the hospital 8/24/2023 to 8/25/2023 for epigastric abdominal pain for a week with associated nausea and vomiting and possible G-tube complication she was admitted for nausea vomiting and intractable abdominal pain radiologic study showed that the G-tube was in good placement she was treated for constipation and discharged home with home health.    HPI/ROS  LIMITATION TO HISTORY   Select: : None  OUTSIDE HISTORIAN(S):  none    Mary Martinez is a 53 y.o. female who presents complaining of having an infection in her G-tube site for the last week.  She states that she has had a fever up to 101.  She was seen by her GI doctor Dr. Rosenberg and he prescribed her moxifloxacin but they do not have it in stock so she has not taken any antibiotics for this infection.  She comes in today because she is constantly changing the dressing around her G-tube because of purulent drainage.  She denies any nausea or vomiting she has had fevers.  She denies any coughing or shortness of breath.  She does have pain in that area that she describes as a throbbing pain around the G-tube site.  She states that she is still able to feed herself through the G-tube.    PAST MEDICAL HISTORY   has a past medical history of Acute renal failure (ARF) ([facility]), Allergy, Anemia, Anxiety, Arthritis, ASTHMA, Blood transfusion without reported diagnosis, Bowel habit changes, Bronchitis (last approx 2018), Chronic pain (04/24/2020), Dental disorder, Depression, GERD  (gastroesophageal reflux disease), GIB (gastrointestinal bleeding), Head ache, Heart burn, Hiatus hernia syndrome, History of pancreatitis, Hypokalemia, Hyponatremia, Idiopathic chronic pancreatitis (HCC), Indigestion, Intractable nausea and vomiting, Kidney disease, Pain, Pneumonia (10/2019), PONV (postoperative nausea and vomiting), Psychiatric problem, Rheumatoid aortitis, Rheumatoid arthritis(714.0) (2003), SMAS (superior mesenteric artery syndrome) (HCC), and Substance abuse (HCC).    SURGICAL HISTORY   has a past surgical history that includes abdominal abscess drainage (9/27/2011); toe fusion (Right, 5/27/2015); bone spur excision (5/27/2015); hammertoe correction (5/27/2015); foot reconstruction rheumatic (Left, 7/29/2015); arthrodesis (Right, 5/6/2016); fusion, pip joint, toe (Right, 8/29/2016); bone graft (Right, 8/29/2016); irrigation & debridement ortho (Right, 9/11/2016); finger amputation (Right, 9/14/2016); finger arthroplasty (Right, 4/17/2017); finger arthroplasty (Right, 6/5/2017); finger amputation (6/5/2017); exploratory of abdomen (N/A, 10/4/2019); tonsillectomy (1982); primary c section (1991); repeat c section (1999); repeat c section (2005); repeat c section (2008); appendectomy (2011); nicholas by laparoscopy (9/27/2011); wrist exploration (Left, 1980's); colonoscopy (2018); dental extraction(s) (2014); endoscopic us exam, esoph (4/29/2020); gastroscopy-endo (4/29/2020); egd with asp/bx (4/29/2020); upper gi endoscopy,diagnosis (N/A, 9/9/2022); egd w/endoscopic ultrasound (N/A, 9/9/2022); place percut gastrostomy tube (N/A, 6/12/2023); upper gi endoscopy,diagnosis (6/12/2023); and upper gi endoscopy,diagnosis (N/A, 8/16/2023).    FAMILY HISTORY  Family History   Problem Relation Age of Onset    Cancer Mother     Heart Disease Mother     Hypertension Mother     Heart Disease Father     Hypertension Father        SOCIAL HISTORY  Social History     Tobacco Use    Smoking status: Every Day      "Current packs/day: 0.50     Average packs/day: 0.5 packs/day for 30.0 years (15.0 ttl pk-yrs)     Types: Cigarettes    Smokeless tobacco: Never   Vaping Use    Vaping Use: Never used   Substance and Sexual Activity    Alcohol use: Never    Drug use: Never    Sexual activity: Not Currently       CURRENT MEDICATIONS  Home Medications       Reviewed by Arnulfo Massey (Pharmacy Tech) on 09/14/23 at 1355  Med List Status: Complete     Medication Last Dose Status   acetaminophen (TYLENOL) 500 MG Tab 9/14/2023 Active   esomeprazole (NEXIUM) 40 MG delayed-release capsule 9/14/2023 Active   ferrous sulfate 325 (65 Fe) MG tablet COUPLE WEEKS AGO Active   oxyCODONE-acetaminophen (PERCOCET) 7.5-325 MG per tablet 9/14/2023 Active   PARoxetine (PAXIL) 30 MG Tab 9/13/2023 Active   QUEtiapine (SEROQUEL) 100 MG Tab 9/13/2023 Active   senna-docusate (PERICOLACE OR SENOKOT S) 8.6-50 MG Tab 9/14/2023 Active   thiamine (THIAMINE) 100 MG tablet COUPLE WEEKS AGO Active                    ALLERGIES  Allergies   Allergen Reactions    Penicillins Anaphylaxis and Hives     Tolerates cephalosporins; reports throat swelling with PCN    Aripiprazole Nausea     Spasms, shaking      Nitrous Oxide Vomiting       PHYSICAL EXAM  VITAL SIGNS: /73   Pulse (!) 111   Temp 37.2 °C (98.9 °F) (Temporal)   Resp 16   Ht 1.6 m (5' 3\")   Wt 39.2 kg (86 lb 6.7 oz)   LMP 09/21/2011   SpO2 99%   BMI 15.31 kg/m²      Constitutional: Well developed, cachectic, No acute distress, Non-toxic appearance.   HEENT: Normocephalic, Atraumatic,  external ears normal, pharynx pink,  Mucous  Membranes dry no rhinorrhea or mucosal edema  Eyes: PERRL, EOMI, Conjunctiva normal, No discharge.   Neck: Normal range of motion, No tenderness, Supple, No stridor.   Lymphatic: No lymphadenopathy    Cardiovascular: Regular Rate and Rhythm, No murmurs,  rubs, or gallops.   Thorax & Lungs: Lungs clear to auscultation bilaterally, No respiratory distress, No wheezes, " rhales or rhonchi, No chest wall tenderness.   Abdomen: Positive for a scaphoid soft abdomen she has a G-tube in place with one centimeter surrounding erythema and purulent drainage  there is no abscess the area is tender to palpation  Skin: Warm, Dry, No erythema, No rash,   Back:  No CVA tenderness,  No spinal tenderness, bony crepitance step offs or instability.   Extremities: Equal, intact distal pulses, No cyanosis, clubbing or edema,  No tenderness.   Musculoskeletal: Good range of motion in all major joints. No tenderness to palpation or major deformities noted.   Neurologic: Alert & oriented No focal deficits noted.  Psychiatric: Affect normal, Judgment normal, Mood normal.      DIAGNOSTIC STUDIES / PROCEDURES  EKG  I have independently interpreted this EKG  none    LABS  Results for orders placed or performed during the hospital encounter of 09/14/23   Lactic acid (lactate)   Result Value Ref Range    Lactic Acid 1.3 0.5 - 2.0 mmol/L   CBC With Differential   Result Value Ref Range    WBC 10.2 4.8 - 10.8 K/uL    RBC 3.66 (L) 4.20 - 5.40 M/uL    Hemoglobin 11.7 (L) 12.0 - 16.0 g/dL    Hematocrit 37.2 37.0 - 47.0 %    .6 (H) 81.4 - 97.8 fL    MCH 32.0 27.0 - 33.0 pg    MCHC 31.5 (L) 32.2 - 35.5 g/dL    RDW 63.1 (H) 35.9 - 50.0 fL    Platelet Count 563 (H) 164 - 446 K/uL    MPV 8.9 (L) 9.0 - 12.9 fL    Neutrophils-Polys 68.80 44.00 - 72.00 %    Lymphocytes 19.90 (L) 22.00 - 41.00 %    Monocytes 7.90 0.00 - 13.40 %    Eosinophils 2.00 0.00 - 6.90 %    Basophils 0.80 0.00 - 1.80 %    Immature Granulocytes 0.60 0.00 - 0.90 %    Nucleated RBC 0.00 0.00 - 0.20 /100 WBC    Neutrophils (Absolute) 7.01 1.82 - 7.42 K/uL    Lymphs (Absolute) 2.03 1.00 - 4.80 K/uL    Monos (Absolute) 0.80 0.00 - 0.85 K/uL    Eos (Absolute) 0.20 0.00 - 0.51 K/uL    Baso (Absolute) 0.08 0.00 - 0.12 K/uL    Immature Granulocytes (abs) 0.06 0.00 - 0.11 K/uL    NRBC (Absolute) 0.00 K/uL   Comp Metabolic Panel   Result Value Ref Range     Sodium 134 (L) 135 - 145 mmol/L    Potassium 3.8 3.6 - 5.5 mmol/L    Chloride 113 (H) 96 - 112 mmol/L    Co2 8 (LL) 20 - 33 mmol/L    Anion Gap 13.0 7.0 - 16.0    Glucose 108 (H) 65 - 99 mg/dL    Bun 23 (H) 8 - 22 mg/dL    Creatinine 0.77 0.50 - 1.40 mg/dL    Calcium 8.8 8.5 - 10.5 mg/dL    Correct Calcium 8.8 8.5 - 10.5 mg/dL    AST(SGOT) 14 12 - 45 U/L    ALT(SGPT) 10 2 - 50 U/L    Alkaline Phosphatase 132 (H) 30 - 99 U/L    Total Bilirubin <0.2 0.1 - 1.5 mg/dL    Albumin 4.0 3.2 - 4.9 g/dL    Total Protein 8.0 6.0 - 8.2 g/dL    Globulin 4.0 (H) 1.9 - 3.5 g/dL    A-G Ratio 1.0 g/dL   ESTIMATED GFR   Result Value Ref Range    GFR (CKD-EPI) 93 >60 mL/min/1.73 m 2         RADIOLOGY  I have independently interpreted the diagnostic imaging associated with this visit and am waiting the final reading from the radiologist.   My preliminary interpretation is as follows: none  Radiologist interpretation: none       COURSE & MEDICAL DECISION MAKING    ED Observation Status? Yes; I am placing the patient in to an observation status due to a diagnostic uncertainty as well as therapeutic intensity. Patient placed in observation status at 12:48 PM, 9/14/2023.     Observation plan is as follows: Lab work IV fluid IV antibiotics and meds to beds    Upon Reevaluation, the patient's condition has: Improved; and will be discharged.    Patient discharged from ED Observation status at 3:25 PM  (Time) 9/14/23 (Date).     INITIAL ASSESSMENT, COURSE AND PLAN  Care Narrative: Mary Martinez is a 53 y.o. female who presents complaining of having an infection in her G-tube site for the last week.  She states that she has had a fever up to 101.  She was seen by her GI doctor Dr. Rosenberg and he prescribed her moxifloxacin but they do not have it in stock so she has not taken any antibiotics for this infection.  She comes in today because she is constantly changing the dressing around her G-tube because of purulent drainage.  She denies  any nausea or vomiting she has had fevers.  She denies any coughing or shortness of breath.  She does have pain in that area that she describes as a throbbing pain around the G-tube site.  She states that she is still able to feed herself through the G-tube.  On physical exam she is cachectic mucous membranes are slightly dry.  She does have a G-tube in place with surrounding erythema and purulent drainage abdomen is otherwise soft and nondistended  HYDRATION: Based on the patient's presentation of Sepsis the patient was given IV fluids. IV Hydration was used because oral hydration was not adequate alone. Upon recheck following hydration, the patient was improved.   Differential diagnosis: G-tube infection,  sepsis, dehydration     ADDITIONAL PROBLEM LIST  SMA syndrome  Chronic abdominal pain  G-tube placement June 2023  GERD  Rheumatoid arthritis Enbrel which can be contributing to her current illness because the patient is immunocompromised  DISPOSITION AND DISCUSSIONS    Patient's white blood cell count is 10.2 hemoglobin 11.7 platelet count 563 with a normal differential.  Lactate is normal at 1.3 comprehensive metabolic panel is a slight low sodium of 134 CO2 is low at 8 anion gap is 13 glucose is 108 BUN is 23.  I gave the patient IV fluid with glucose in it for her CO2 which I does expect to be starvation ketosis.  She was also treated with IV Rocephin and Flagyl.  I gave her morphine and Zofran for pain and nausea.    The patient tolerated the IV antibiotics.  Because of her low CO2 at 8 I suspect she has starvation ketosis and she received a liter of D5LR.  The patient looks much better and has a soft abdomen and is not febrile or toxic appearing on recheck.  I will discharge her home she states she has plenty of help with home health.  She is to continue the oral antibiotics that she was given here in the emergency department for meds to beds as directed and follow-up with her primary care physician as  well as Dr. Rosenberg.  If the patient develops any fevers worsening drainage abdominal pain or any worsening symptoms she should return the emergency department for recheck.    I have discussed management of the patient with the following physicians and HALIMA's:  none    Discussion of management with other \[facility] [place] [facility]\"" or appropriate source(s): Pharmacy regarding antibiotic choice      Escalation of care considered, and ultimately not performed:acute inpatient care management, however at this time, the patient is most appropriate for outpatient management    Barriers to care at this time, including but not limited to: none.     Decision tools and prescription drugs considered including, but not limited to: Antibiotics rocephin and flagyl and Pain Medications morphine .  The patient will return for new or worsening symptoms and is stable at the time of discharge.    The patient is referred to a primary physician for blood pressure management, diabetic screening, and for all other preventative health concerns.      DISPOSITION:  Patient will be discharged home in stable condition.    FOLLOW UP:  Fidencio Christopher D.O.  [address] [facility] [address]  [phone]    Call in 2 days  for recheck    Pankaj Rosenberg M.D.  [address] [facility] [address]  [phone]    Call in 2 days  for recheck      OUTPATIENT MEDICATIONS:  Discharge Medication List as of 9/14/2023  3:06 PM        START taking these medications    Details   ciprofloxacin (CIPRO) 500 MG Tab Take 1 Tablet by mouth 2 times a day for 10 days.Meds to bedDisp-20 Tablet, R-0, Normal             FINAL DIAGNOSIS  1. Skin infection at gastrostomy tube site (HCC)    2. Ketosis (HCC)           Electronically signed by: Veroinca Menezes M.D., 9/14/2023 12:46 PM

## 2023-09-16 LAB
BACTERIA WND AEROBE CULT: ABNORMAL
BACTERIA WND AEROBE CULT: ABNORMAL
GRAM STN SPEC: ABNORMAL
SIGNIFICANT IND 70042: ABNORMAL
SITE SITE: ABNORMAL
SOURCE SOURCE: ABNORMAL

## 2023-09-19 NOTE — ED NOTES
ED Positive Culture Follow-up/Notification Note:    Date: 9/19/23   Patient seen in the ED on 9/14/2023 for evaluation a G-tube infection. Patient reports fever at home up to 101. Patient was supposed to begin taking moxifloxacin but it was out of stock. Patient must continuously change dressing around G-tube due to purulent drainage. Patient denies nausea and vomiting but reports fevers. Patient reports throbbing pain around the G-tube site.  Physical examination  Abdomen: Positive for a scaphoid soft abdomen she has a G-tube in place with one centimeter surrounding erythema and purulent drainage  there is no abscess the area is tender to palpation  Skin: Warm, Dry, No erythema, No rash,   1. Skin infection at gastrostomy tube site (HCC)    2. Ketosis (HCC)       Discharge Medication List as of 9/14/2023  3:06 PM        START taking these medications    Details   ciprofloxacin (CIPRO) 500 MG Tab Take 1 Tablet by mouth 2 times a day for 10 days.Meds to bedDisp-20 Tablet, R-0, Normal             Allergies: Penicillins, Aripiprazole, and Nitrous oxide     Vitals:    09/14/23 1330 09/14/23 1400 09/14/23 1435 09/14/23 1506   BP: 125/69  (!) 89/56 101/67   Pulse: 88 77 71 75   Resp: 20 16 16 14   Temp:    37.1 °C (98.7 °F)   TempSrc:    Temporal   SpO2: 98% 97% 99% 97%   Weight:       Height:           Final cultures:   Results       Procedure Component Value Units Date/Time    CULTURE WOUND W/ GRAM STAIN [[id number]]  (Abnormal)  (Susceptibility) Collected: 09/14/23 1447    Order Status: Completed Specimen: Wound from Abdominal Updated: 09/16/23 1125     Significant Indicator POS     Source WND     Site G tube site, abdominal     Culture Result Moderate growth usual intestinal jerry, including  Lactobacillus sp & yeast.       Gram Stain Result Many Gram positive rods.  Few Yeast.       Culture Result Pseudomonas aeruginosa  Light growth      Narrative:      G tube site  Release to patient->Immediate  G tube site     Susceptibility       Pseudomonas aeruginosa (1)       Antibiotic Interpretation Microscan   Method Status    Ceftolozane+Tazobactam  [*]  Sensitive <=2 mcg/mL VICKY Final    Ceftazidime  [*]  Sensitive 8 mcg/mL VICKY Final    Ciprofloxacin Sensitive <=0.25 mcg/mL VICKY Final    Cefepime Sensitive 4 mcg/mL VICKY Final    Aztreonam  [*]  Sensitive <=4 mcg/mL VICKY Final    Gentamicin Resistant <=2 mcg/mL VICKY Final    Tobramycin Sensitive <=2 mcg/mL VICKY Final    Imipenem  [*]  Sensitive <=1 mcg/mL VICKY Final    Levofloxacin  [*]  Sensitive <=0.5 mcg/mL VICKY Final    Meropenem Sensitive <=1 mcg/mL VICKY Final    Pip/Tazobactam Sensitive <=8 mcg/mL VICKY Final               [*]  Suppressed Antibiotic                   Blood Culture - Draw one set from central line if present [[id number]] Collected: 09/14/23 1306    Order Status: Completed Specimen: Blood from Line Updated: 09/15/23 0723     Significant Indicator NEG     Source BLD     Site Peripheral     Culture Result No Growth  Note: Blood cultures are incubated for 5 days and  are monitored continuously.Positive blood cultures  are called to the RN and reported as soon as  they are identified.      Narrative:      Blood Cultures X2. Draw one blood culture from central line  (including implanted port) and one blood culture  peripherally. If no central line present draw blood cultures  times two peripherally from different sites  Release to patient->Immediate  Right Upper Arm    Blood Culture - Draw one set from central line if present [[id number]] Collected: 09/14/23 1235    Order Status: Completed Specimen: Blood from Peripheral Updated: 09/15/23 0723     Significant Indicator NEG     Source BLD     Site PERIPHERAL     Culture Result No Growth  Note: Blood cultures are incubated for 5 days and  are monitored continuously.Positive blood cultures  are called to the RN and reported as soon as  they are identified.      Narrative:      Blood Cultures X2. Draw one blood culture from central  line  (including implanted port) and one blood culture  peripherally. If no central line present draw blood cultures  times two peripherally from different sites  Release to patient->Immediate  Left Hand    GRAM STAIN [[id number]] Collected: 09/14/23 1447    Order Status: Completed Specimen: Wound Updated: 09/14/23 1925     Significant Indicator .     Source WND     Site G tube site, abdominal     Gram Stain Result Many Gram positive rods.  Few Yeast.      Narrative:      G tube site  Release to patient->Immediate  G tube site    Urinalysis [[id number]] Collected: 09/14/23 0000    Order Status: Canceled Specimen: Urine     Urine Culture (New) [[id number]] Collected: 09/14/23 0000    Order Status: Canceled Specimen: Urine             Plan:   G-tube site wound culture positive for Pseudomonas aeruginosa susceptible to ciprofloxacin. Normal intestinal jerry also found at the site.  Called patient to discuss results. Patient has been taking antibiotics (ciprofloxacin and metronidazole) and is not feeling better. Patient reports pain is worse at site. Explained to patient that treatment options for this bacteria are limited if ciprofloxacin isn't working then there are likely not better options available to be taken orally and she would need to come back for treatment with IV antibiotics. Patient does have follow up with outpatient doctor to evaluate G tube site but can't be seen until the middle of October.  Explained to patient that she may come back in for treatment if she wishes or she may try to deal with symptoms as they currently present until her appointment, but if she has a fever or chills or any significant worsening of symptoms then she should come back in for treatment.  Patient to discuss with her son to consider options.  Aaron Burris, PharmD

## 2023-09-20 ENCOUNTER — HOSPITAL ENCOUNTER (INPATIENT)
Facility: MEDICAL CENTER | Age: 53
LOS: 1 days | DRG: 393 | End: 2023-09-21
Attending: EMERGENCY MEDICINE | Admitting: FAMILY MEDICINE
Payer: MEDICAID

## 2023-09-20 ENCOUNTER — APPOINTMENT (OUTPATIENT)
Dept: RADIOLOGY | Facility: MEDICAL CENTER | Age: 53
DRG: 393 | End: 2023-09-20
Attending: EMERGENCY MEDICINE
Payer: MEDICAID

## 2023-09-20 DIAGNOSIS — A49.8 PSEUDOMONAS INFECTION: ICD-10-CM

## 2023-09-20 DIAGNOSIS — R10.84 GENERALIZED ABDOMINAL PAIN: ICD-10-CM

## 2023-09-20 DIAGNOSIS — L03.311 CELLULITIS OF ABDOMINAL WALL: ICD-10-CM

## 2023-09-20 PROBLEM — D75.89 MACROCYTOSIS WITHOUT ANEMIA: Status: ACTIVE | Noted: 2023-09-20

## 2023-09-20 LAB
ALBUMIN SERPL BCP-MCNC: 4.5 G/DL (ref 3.2–4.9)
ALBUMIN/GLOB SERPL: 1.2 G/DL
ALP SERPL-CCNC: 161 U/L (ref 30–99)
ALT SERPL-CCNC: 6 U/L (ref 2–50)
ANION GAP SERPL CALC-SCNC: 14 MMOL/L (ref 7–16)
APPEARANCE UR: CLEAR
APTT PPP: 32.3 SEC (ref 24.7–36)
AST SERPL-CCNC: 9 U/L (ref 12–45)
BACTERIA #/AREA URNS HPF: NEGATIVE /HPF
BASOPHILS # BLD AUTO: 0.8 % (ref 0–1.8)
BASOPHILS # BLD: 0.1 K/UL (ref 0–0.12)
BILIRUB SERPL-MCNC: 0.2 MG/DL (ref 0.1–1.5)
BILIRUB UR QL STRIP.AUTO: NEGATIVE
BUN SERPL-MCNC: 18 MG/DL (ref 8–22)
CALCIUM ALBUM COR SERPL-MCNC: 8.6 MG/DL (ref 8.5–10.5)
CALCIUM SERPL-MCNC: 9 MG/DL (ref 8.5–10.5)
CHLORIDE SERPL-SCNC: 110 MMOL/L (ref 96–112)
CO2 SERPL-SCNC: 9 MMOL/L (ref 20–33)
COLOR UR: YELLOW
CREAT SERPL-MCNC: 0.76 MG/DL (ref 0.5–1.4)
EOSINOPHIL # BLD AUTO: 0.18 K/UL (ref 0–0.51)
EOSINOPHIL NFR BLD: 1.5 % (ref 0–6.9)
EPI CELLS #/AREA URNS HPF: NORMAL /HPF
ERYTHROCYTE [DISTWIDTH] IN BLOOD BY AUTOMATED COUNT: 62.7 FL (ref 35.9–50)
GFR SERPLBLD CREATININE-BSD FMLA CKD-EPI: 95 ML/MIN/1.73 M 2
GLOBULIN SER CALC-MCNC: 3.8 G/DL (ref 1.9–3.5)
GLUCOSE SERPL-MCNC: 99 MG/DL (ref 65–99)
GLUCOSE UR STRIP.AUTO-MCNC: NEGATIVE MG/DL
HCT VFR BLD AUTO: 43.9 % (ref 37–47)
HGB BLD-MCNC: 14 G/DL (ref 12–16)
HYALINE CASTS #/AREA URNS LPF: NORMAL /LPF
IMM GRANULOCYTES # BLD AUTO: 0.08 K/UL (ref 0–0.11)
IMM GRANULOCYTES NFR BLD AUTO: 0.7 % (ref 0–0.9)
INR PPP: 1.04 (ref 0.87–1.13)
KETONES UR STRIP.AUTO-MCNC: NEGATIVE MG/DL
LACTATE SERPL-SCNC: 1.3 MMOL/L (ref 0.5–2)
LACTATE SERPL-SCNC: 1.9 MMOL/L (ref 0.5–2)
LEUKOCYTE ESTERASE UR QL STRIP.AUTO: NEGATIVE
LYMPHOCYTES # BLD AUTO: 2.41 K/UL (ref 1–4.8)
LYMPHOCYTES NFR BLD: 20 % (ref 22–41)
MAGNESIUM SERPL-MCNC: 2.7 MG/DL (ref 1.5–2.5)
MCH RBC QN AUTO: 32.3 PG (ref 27–33)
MCHC RBC AUTO-ENTMCNC: 31.9 G/DL (ref 32.2–35.5)
MCV RBC AUTO: 101.2 FL (ref 81.4–97.8)
MICRO URNS: ABNORMAL
MONOCYTES # BLD AUTO: 0.59 K/UL (ref 0–0.85)
MONOCYTES NFR BLD AUTO: 4.9 % (ref 0–13.4)
NEUTROPHILS # BLD AUTO: 8.68 K/UL (ref 1.82–7.42)
NEUTROPHILS NFR BLD: 72.1 % (ref 44–72)
NITRITE UR QL STRIP.AUTO: NEGATIVE
NRBC # BLD AUTO: 0 K/UL
NRBC BLD-RTO: 0 /100 WBC (ref 0–0.2)
PH UR STRIP.AUTO: 6.5 [PH] (ref 5–8)
PLATELET # BLD AUTO: 559 K/UL (ref 164–446)
PMV BLD AUTO: 8.9 FL (ref 9–12.9)
POTASSIUM SERPL-SCNC: 3.3 MMOL/L (ref 3.6–5.5)
PROT SERPL-MCNC: 8.3 G/DL (ref 6–8.2)
PROT UR QL STRIP: 30 MG/DL
PROTHROMBIN TIME: 13.7 SEC (ref 12–14.6)
RBC # BLD AUTO: 4.34 M/UL (ref 4.2–5.4)
RBC # URNS HPF: NORMAL /HPF
RBC UR QL AUTO: NEGATIVE
SODIUM SERPL-SCNC: 133 MMOL/L (ref 135–145)
SP GR UR STRIP.AUTO: 1.01
UROBILINOGEN UR STRIP.AUTO-MCNC: 0.2 MG/DL
WBC # BLD AUTO: 12 K/UL (ref 4.8–10.8)
WBC #/AREA URNS HPF: NORMAL /HPF

## 2023-09-20 PROCEDURE — 85730 THROMBOPLASTIN TIME PARTIAL: CPT

## 2023-09-20 PROCEDURE — 99285 EMERGENCY DEPT VISIT HI MDM: CPT

## 2023-09-20 PROCEDURE — 700105 HCHG RX REV CODE 258

## 2023-09-20 PROCEDURE — 700102 HCHG RX REV CODE 250 W/ 637 OVERRIDE(OP): Mod: JZ

## 2023-09-20 PROCEDURE — 87086 URINE CULTURE/COLONY COUNT: CPT

## 2023-09-20 PROCEDURE — 700117 HCHG RX CONTRAST REV CODE 255: Mod: UD | Performed by: EMERGENCY MEDICINE

## 2023-09-20 PROCEDURE — 83735 ASSAY OF MAGNESIUM: CPT

## 2023-09-20 PROCEDURE — 87040 BLOOD CULTURE FOR BACTERIA: CPT | Mod: 91

## 2023-09-20 PROCEDURE — 74177 CT ABD & PELVIS W/CONTRAST: CPT

## 2023-09-20 PROCEDURE — 700111 HCHG RX REV CODE 636 W/ 250 OVERRIDE (IP)

## 2023-09-20 PROCEDURE — 80053 COMPREHEN METABOLIC PANEL: CPT

## 2023-09-20 PROCEDURE — A9270 NON-COVERED ITEM OR SERVICE: HCPCS | Mod: JZ

## 2023-09-20 PROCEDURE — 81001 URINALYSIS AUTO W/SCOPE: CPT

## 2023-09-20 PROCEDURE — 96376 TX/PRO/DX INJ SAME DRUG ADON: CPT

## 2023-09-20 PROCEDURE — 85025 COMPLETE CBC W/AUTO DIFF WBC: CPT

## 2023-09-20 PROCEDURE — 306637 HCHG MISC ORTHO ITEM RC 0274

## 2023-09-20 PROCEDURE — 700105 HCHG RX REV CODE 258: Mod: UD | Performed by: EMERGENCY MEDICINE

## 2023-09-20 PROCEDURE — 36415 COLL VENOUS BLD VENIPUNCTURE: CPT

## 2023-09-20 PROCEDURE — 83605 ASSAY OF LACTIC ACID: CPT | Mod: 91

## 2023-09-20 PROCEDURE — 71045 X-RAY EXAM CHEST 1 VIEW: CPT

## 2023-09-20 PROCEDURE — 96367 TX/PROPH/DG ADDL SEQ IV INF: CPT

## 2023-09-20 PROCEDURE — 96375 TX/PRO/DX INJ NEW DRUG ADDON: CPT

## 2023-09-20 PROCEDURE — 700111 HCHG RX REV CODE 636 W/ 250 OVERRIDE (IP): Mod: JZ

## 2023-09-20 PROCEDURE — 700111 HCHG RX REV CODE 636 W/ 250 OVERRIDE (IP): Mod: JZ,UD | Performed by: EMERGENCY MEDICINE

## 2023-09-20 PROCEDURE — 85610 PROTHROMBIN TIME: CPT

## 2023-09-20 PROCEDURE — 96372 THER/PROPH/DIAG INJ SC/IM: CPT

## 2023-09-20 RX ORDER — ONDANSETRON 2 MG/ML
4 INJECTION INTRAMUSCULAR; INTRAVENOUS EVERY 4 HOURS PRN
Status: DISCONTINUED | OUTPATIENT
Start: 2023-09-20 | End: 2023-09-20

## 2023-09-20 RX ORDER — ENOXAPARIN SODIUM 100 MG/ML
30 INJECTION SUBCUTANEOUS DAILY
Status: DISCONTINUED | OUTPATIENT
Start: 2023-09-20 | End: 2023-09-21 | Stop reason: HOSPADM

## 2023-09-20 RX ORDER — OMEPRAZOLE 20 MG/1
20 CAPSULE, DELAYED RELEASE ORAL DAILY
Status: DISCONTINUED | OUTPATIENT
Start: 2023-09-21 | End: 2023-09-21

## 2023-09-20 RX ORDER — OXYCODONE HYDROCHLORIDE AND ACETAMINOPHEN 5; 325 MG/1; MG/1
1 TABLET ORAL EVERY 8 HOURS PRN
Status: DISCONTINUED | OUTPATIENT
Start: 2023-09-20 | End: 2023-09-21

## 2023-09-20 RX ORDER — ONDANSETRON 2 MG/ML
4 INJECTION INTRAMUSCULAR; INTRAVENOUS ONCE
Status: COMPLETED | OUTPATIENT
Start: 2023-09-20 | End: 2023-09-20

## 2023-09-20 RX ORDER — IBUPROFEN 600 MG/1
600 TABLET ORAL EVERY 6 HOURS PRN
Status: DISCONTINUED | OUTPATIENT
Start: 2023-09-20 | End: 2023-09-20

## 2023-09-20 RX ORDER — HYDROMORPHONE HYDROCHLORIDE 1 MG/ML
0.5 INJECTION, SOLUTION INTRAMUSCULAR; INTRAVENOUS; SUBCUTANEOUS
Status: DISCONTINUED | OUTPATIENT
Start: 2023-09-20 | End: 2023-09-21 | Stop reason: HOSPADM

## 2023-09-20 RX ORDER — ACETAMINOPHEN 325 MG/1
650 TABLET ORAL EVERY 4 HOURS PRN
Status: DISCONTINUED | OUTPATIENT
Start: 2023-09-20 | End: 2023-09-21

## 2023-09-20 RX ORDER — POTASSIUM CHLORIDE 20 MEQ/1
20 TABLET, EXTENDED RELEASE ORAL ONCE
Status: COMPLETED | OUTPATIENT
Start: 2023-09-20 | End: 2023-09-20

## 2023-09-20 RX ORDER — SODIUM CHLORIDE, SODIUM LACTATE, POTASSIUM CHLORIDE, CALCIUM CHLORIDE 600; 310; 30; 20 MG/100ML; MG/100ML; MG/100ML; MG/100ML
1000 INJECTION, SOLUTION INTRAVENOUS ONCE
Status: COMPLETED | OUTPATIENT
Start: 2023-09-20 | End: 2023-09-20

## 2023-09-20 RX ORDER — LABETALOL HYDROCHLORIDE 5 MG/ML
10 INJECTION, SOLUTION INTRAVENOUS EVERY 4 HOURS PRN
Status: DISCONTINUED | OUTPATIENT
Start: 2023-09-20 | End: 2023-09-21

## 2023-09-20 RX ORDER — SODIUM CHLORIDE 9 MG/ML
1000 INJECTION, SOLUTION INTRAVENOUS ONCE
Status: DISCONTINUED | OUTPATIENT
Start: 2023-09-20 | End: 2023-09-20

## 2023-09-20 RX ORDER — GAUZE BANDAGE 2" X 2"
100 BANDAGE TOPICAL DAILY
Status: DISCONTINUED | OUTPATIENT
Start: 2023-09-21 | End: 2023-09-21

## 2023-09-20 RX ORDER — MORPHINE SULFATE 4 MG/ML
4 INJECTION INTRAVENOUS ONCE
Status: COMPLETED | OUTPATIENT
Start: 2023-09-20 | End: 2023-09-20

## 2023-09-20 RX ORDER — PROCHLORPERAZINE EDISYLATE 5 MG/ML
10 INJECTION INTRAMUSCULAR; INTRAVENOUS EVERY 6 HOURS PRN
Status: DISCONTINUED | OUTPATIENT
Start: 2023-09-20 | End: 2023-09-21 | Stop reason: HOSPADM

## 2023-09-20 RX ORDER — SODIUM CHLORIDE, SODIUM LACTATE, POTASSIUM CHLORIDE, AND CALCIUM CHLORIDE .6; .31; .03; .02 G/100ML; G/100ML; G/100ML; G/100ML
30 INJECTION, SOLUTION INTRAVENOUS ONCE
Status: COMPLETED | OUTPATIENT
Start: 2023-09-20 | End: 2023-09-20

## 2023-09-20 RX ORDER — ONDANSETRON 2 MG/ML
INJECTION INTRAMUSCULAR; INTRAVENOUS
Status: COMPLETED
Start: 2023-09-20 | End: 2023-09-20

## 2023-09-20 RX ORDER — ESOMEPRAZOLE MAGNESIUM 40 MG/1
40 CAPSULE, DELAYED RELEASE ORAL DAILY
Status: DISCONTINUED | OUTPATIENT
Start: 2023-09-21 | End: 2023-09-20

## 2023-09-20 RX ORDER — NICOTINE 21 MG/24HR
14 PATCH, TRANSDERMAL 24 HOURS TRANSDERMAL
Status: DISCONTINUED | OUTPATIENT
Start: 2023-09-21 | End: 2023-09-21 | Stop reason: HOSPADM

## 2023-09-20 RX ORDER — QUETIAPINE FUMARATE 100 MG/1
100 TABLET, FILM COATED ORAL EVERY EVENING
Status: DISCONTINUED | OUTPATIENT
Start: 2023-09-20 | End: 2023-09-21 | Stop reason: HOSPADM

## 2023-09-20 RX ORDER — SODIUM CHLORIDE, SODIUM LACTATE, POTASSIUM CHLORIDE, CALCIUM CHLORIDE 600; 310; 30; 20 MG/100ML; MG/100ML; MG/100ML; MG/100ML
INJECTION, SOLUTION INTRAVENOUS CONTINUOUS
Status: DISCONTINUED | OUTPATIENT
Start: 2023-09-20 | End: 2023-09-21

## 2023-09-20 RX ADMIN — SODIUM CHLORIDE, POTASSIUM CHLORIDE, SODIUM LACTATE AND CALCIUM CHLORIDE 1000 ML: 600; 310; 30; 20 INJECTION, SOLUTION INTRAVENOUS at 15:14

## 2023-09-20 RX ADMIN — ENOXAPARIN SODIUM 30 MG: 100 INJECTION SUBCUTANEOUS at 20:25

## 2023-09-20 RX ADMIN — SODIUM CHLORIDE, POTASSIUM CHLORIDE, SODIUM LACTATE AND CALCIUM CHLORIDE: 600; 310; 30; 20 INJECTION, SOLUTION INTRAVENOUS at 22:09

## 2023-09-20 RX ADMIN — ONDANSETRON 4 MG: 2 INJECTION INTRAMUSCULAR; INTRAVENOUS at 15:09

## 2023-09-20 RX ADMIN — HYDROMORPHONE HYDROCHLORIDE 0.5 MG: 1 INJECTION, SOLUTION INTRAMUSCULAR; INTRAVENOUS; SUBCUTANEOUS at 22:33

## 2023-09-20 RX ADMIN — MORPHINE SULFATE 4 MG: 4 INJECTION, SOLUTION INTRAMUSCULAR; INTRAVENOUS at 18:47

## 2023-09-20 RX ADMIN — SODIUM CHLORIDE, POTASSIUM CHLORIDE, SODIUM LACTATE AND CALCIUM CHLORIDE 1170 ML: 600; 310; 30; 20 INJECTION, SOLUTION INTRAVENOUS at 15:10

## 2023-09-20 RX ADMIN — MORPHINE SULFATE 4 MG: 4 INJECTION, SOLUTION INTRAMUSCULAR; INTRAVENOUS at 15:09

## 2023-09-20 RX ADMIN — ONDANSETRON 4 MG: 2 INJECTION INTRAMUSCULAR; INTRAVENOUS at 22:21

## 2023-09-20 RX ADMIN — IOHEXOL 80 ML: 350 INJECTION, SOLUTION INTRAVENOUS at 17:27

## 2023-09-20 RX ADMIN — POTASSIUM CHLORIDE 20 MEQ: 1500 TABLET, EXTENDED RELEASE ORAL at 22:33

## 2023-09-20 RX ADMIN — CEFEPIME 2 G: 2 INJECTION, POWDER, FOR SOLUTION INTRAVENOUS at 15:54

## 2023-09-20 ASSESSMENT — FIBROSIS 4 INDEX: FIB4 SCORE: 0.4

## 2023-09-20 NOTE — ED PROVIDER NOTES
ER Provider Note    Scribed for Chantel Neri M.d. by Elsy Love. 9/20/2023  2:28 PM    Primary Care Provider: Fidencio Christopher D.O.    CHIEF COMPLAINT  Chief Complaint   Patient presents with    Feeding Tube Problem     Patient to triage via wc has infected g tube, has followed up with GI and ED was prescribed antibiotics, pathologist called to notify her the antibiotics are not working and needs IV antibiotics.      EXTERNAL RECORDS REVIEWED  Outpatient Notes Patient was seen on 9/14/23 for an infection with her G tube, she grew pseudomonas and was put on Cipro.    HPI/ROS  LIMITATION TO HISTORY   None  OUTSIDE HISTORIAN(S):  None    Mary Martinez is a 53 y.o. female who presents to the ED complaining of a feeding tube problem onset prior to arrival. Patient was seen on 9/14/23 for an infection with her G tube. She followed up with GI and was prescribed antibiotics. She received a call from pathologist notifying her that her antibiotics aren't working and she needs to be placed on IV antibiotics. Patient has associated fever, chills, nausea and abdominal pain. Patient has a drug allergy to penicillin, aripiprazole and nitrous oxide. No alleviating or exacerbating factors noted.     PAST MEDICAL HISTORY  Past Medical History:   Diagnosis Date    Acute renal failure (ARF) (HCC)     Allergy     Anemia     Anxiety     Arthritis     Rheumatoid -follows with rheumatologist. Has several fractures due to RA.    ASTHMA     inhalers prn    Blood transfusion without reported diagnosis     Bowel habit changes     4/24/20-constipation and diarrhea.    Bronchitis last approx 2018    Chronic pain 04/24/2020    Due to RA    Dental disorder     no teeth upper-does not wear her denture. Broken teeth on bottom.    Depression     GERD (gastroesophageal reflux disease)     GIB (gastrointestinal bleeding)     Head ache     Heart burn     taking famotidine    Hiatus hernia syndrome     History of pancreatitis     Hypokalemia      Hyponatremia     Idiopathic chronic pancreatitis (HCC)     Indigestion     taking famotidine    Intractable nausea and vomiting     Kidney disease     Pain     CPS-everywhere    Pneumonia 10/2019    PONV (postoperative nausea and vomiting)     Psychiatric problem     anxiety and depression    Rheumatoid aortitis     Rheumatoid arthritis(714.0) 2003    SMAS (superior mesenteric artery syndrome) (HCC)     Substance abuse (HCC)      SURGICAL HISTORY  Past Surgical History:   Procedure Laterality Date    WA UPPER GI ENDOSCOPY,DIAGNOSIS N/A 8/16/2023    Procedure: GASTROSCOPY;  Surgeon: Blossom Peres M.D.;  Location: SURGERY SAME DAY [facility];  Service: Gastroenterology    WA PLACE PERCUT GASTROSTOMY TUBE N/A 6/12/2023    Procedure: INSERTION, PEG TUBE - PEG AND J TUBE PLACEMENT;  Surgeon: Marilyn Anderson M.D.;  Location: SURGERY SAME DAY [facility];  Service: Gastroenterology    WA UPPER GI ENDOSCOPY,DIAGNOSIS  6/12/2023    Procedure: GASTROSCOPY;  Surgeon: Marilyn Anderson M.D.;  Location: SURGERY SAME DAY [facility];  Service: Gastroenterology    WA UPPER GI ENDOSCOPY,DIAGNOSIS N/A 9/9/2022    Procedure: ENDOSCOPIC ULTRASOUND- UPPER;  Surgeon: Jorge Brooke M.D.;  Location: [facility];  Service: EUS    EGD W/ENDOSCOPIC ULTRASOUND N/A 9/9/2022    Procedure: EGD, WITH ENDOSCOPIC US;  Surgeon: Jorge Brooke M.D.;  Location: [facility];  Service: EUS    WA ENDOSCOPIC US EXAM, ESOPH  4/29/2020    Procedure: EGD, WITH ENDOSCOPIC US;  Surgeon: Jorge Brooke M.D.;  Location: [facility];  Service: Gastroenterology    GASTROSCOPY-ENDO  4/29/2020    Procedure: GASTROSCOPY;  Surgeon: Jorge Brooke M.D.;  Location: [facility];  Service: Gastroenterology    EGD WITH ASP/BX  4/29/2020    Procedure: EGD, WITH ASPIRATION BIOPSY - POSS FNA;  Surgeon: Jorge Brooke M.D.;  Location: [facility];  Service: Gastroenterology     CT EXPLORATORY OF ABDOMEN N/A 10/4/2019    Procedure: LAPAROTOMY, EXPLORATORY AND REPAIR OF DUODENAL PERFORATION;  Surgeon: James Dumont M.D.;  Location: SURGERY [facility];  Service: General    COLONOSCOPY  2018    with upper endoscopy    FINGER ARTHROPLASTY Right 6/5/2017    Procedure: FINGER ARTHROPLASTY - LONG, RING AND SMALL VOLAR PLATE;  Surgeon: Lobo Rosen M.D.;  Location: SURGERY SAME DAY [facility];  Service:     FINGER AMPUTATION  6/5/2017    Procedure: FINGER AMPUTATION - LONG, RING AND SMALL AT THE PROXIMAL INTERPHALANGEAL JOINT;  Surgeon: Lobo Rosen M.D.;  Location: SURGERY SAME DAY [facility];  Service:     FINGER ARTHROPLASTY Right 4/17/2017    Procedure: FINGER ARTHROPLASTY ;  Surgeon: Lobo Rosen M.D.;  Location: SURGERY SAME DAY [facility];  Service:     FINGER AMPUTATION Right 9/14/2016    Procedure: FINGER AMPUTATION INDEX;  Surgeon: Lobo Rosen M.D.;  Location: SURGERY SAME DAY [facility];  Service:     IRRIGATION & DEBRIDEMENT ORTHO Right 9/11/2016    Procedure: IRRIGATION & DEBRIDEMENT ORTHO - right index finger;  Surgeon: Madhu Rosen M.D.;  Location: SURGERY [facility];  Service:     FUSION, PIP JOINT, TOE Right 8/29/2016    Procedure: RE-DO INDEX FINGER PROXIMAL INTERPHALANGEAL ARTHRODESIS;  Surgeon: Lobo Rosen M.D.;  Location: SURGERY SAME DAY [facility];  Service:     BONE GRAFT Right 8/29/2016    Procedure: BONE GRAFT - DISTAL RADIUS ;  Surgeon: Lobo Rosen M.D.;  Location: SURGERY SAME DAY [facility];  Service:     ARTHRODESIS Right 5/6/2016    Procedure: ARTHRODESIS INDEX FINGER PROXIMAL INTERPHALANGEAL;  Surgeon: Lobo Rosen M.D.;  Location: SURGERY SAME DAY [facility];  Service:     FOOT RECONSTRUCTION RHEUMATIC Left 7/29/2015    Procedure: FOOT RECONSTRUCTION RHEUMATIC;  Surgeon: Heriberto Alves M.D.;  Location: [facility];  Service:     TOE FUSION Right 5/27/2015     Procedure: TOE FUSION 1ST METATARSALPHALANGEAL JOINT;  Surgeon: Heriberto Alves M.D.;  Location: SURGERY [facility];  Service:     BONE SPUR EXCISION  5/27/2015    Procedure: BONE SPUR EXCISION METATARSAL HEAD 2-3;  Surgeon: Heriberto Alves M.D.;  Location: SURGERY [facility];  Service:     HAMMERTOE CORRECTION  5/27/2015    Procedure: HAMMERTOE CORRECTION AND SOFT TISSUE RE-ALINGMENT 2-3 ;  Surgeon: Heriberto Alves M.D.;  Location: SURGERY [facility];  Service:     DENTAL EXTRACTION(S)  2014    all of upper teeth    ABDOMINAL ABSCESS DRAINAGE  9/27/2011    Performed by VERO WRIGHT at SURGERY [facility]    EMANUEL BY LAPAROSCOPY  9/27/2011    Performed by VERO WRIGHT at SURGERY [facility]    APPENDECTOMY  2011    Found out it had ruptured prior to abcess surgery    REPEAT C SECTION  2008    REPEAT C SECTION  2005    REPEAT C SECTION  1999    PRIMARY C SECTION  1991    TONSILLECTOMY  1982    WRIST EXPLORATION Left 1980's    fractured wrist-no hardware     FAMILY HISTORY  Family History   Problem Relation Age of Onset    Cancer Mother     Heart Disease Mother     Hypertension Mother     Heart Disease Father     Hypertension Father      SOCIAL HISTORY   reports that she has been smoking cigarettes. She has a 15.0 pack-year smoking history. She has never used smokeless tobacco. She reports that she does not drink alcohol and does not use drugs.    CURRENT MEDICATIONS  Previous Medications    ACETAMINOPHEN (TYLENOL) 500 MG TAB    Take 1,000 mg by mouth one time as needed for Mild Pain. 2 tablets = 1,000 MG    CIPROFLOXACIN (CIPRO) 500 MG TAB    Take 1 Tablet by mouth 2 times a day for 10 days.    ESOMEPRAZOLE (NEXIUM) 40 MG DELAYED-RELEASE CAPSULE    Take 1 Capsule by mouth daily for 30 days.    METRONIDAZOLE (FLAGYL) 500 MG TAB    Take 1 Tablet by mouth 2 times a day for 7 days.    OXYCODONE-ACETAMINOPHEN (PERCOCET) 7.5-325 MG PER TABLET    Take 1 Tablet by mouth every 6 hours as  "needed for Severe Pain for up to 30 days.    PAROXETINE (PAXIL) 30 MG TAB    Take 60 mg by mouth every evening. 2 tablets = 60 mg.    QUETIAPINE (SEROQUEL) 100 MG TAB    Take 1 Tablet by mouth every evening.    SENNA-DOCUSATE (PERICOLACE OR SENOKOT S) 8.6-50 MG TAB    Take 1 Tablet by mouth 2 times a day.     ALLERGIES  Penicillins, Aripiprazole, and Nitrous oxide    PHYSICAL EXAM  /79   Pulse (!) 108   Temp 36.7 °C (98 °F) (Temporal)   Resp 20   Ht 1.6 m (5' 3\")   Wt 39 kg (86 lb)   LMP 09/21/2011   SpO2 99%   BMI 15.23 kg/m²   Constitutional: Mild distress. Cachectic  HENT: Normocephalic, Atraumatic, No oral exudates. Dry mucous membrane.   Eyes: Conjunctiva normal, No discharge.   Cardiovascular: Normal heart rate, Normal rhythm, No murmurs, equal pulses.   Pulmonary: Normal breath sounds, No respiratory distress, No wheezing, No rales, No rhonchi.  Abdomen:Soft, No masses, no rebound, no guarding. G tube placed in left upper abdomen with surrounding erythema.  Small amount of purulent discharge.  Tenderness surrounding the feeding tube. No obvious fluctuants.   Back: No CVA tenderness.   Musculoskeletal: No major deformities noted, No tenderness.   Skin: Warm, Dry, No erythema, No rash.   Neurologic: Alert & oriented x 3, Normal motor function,  No focal deficits noted.   Psychiatric: Affect normal, Judgment normal, Mood normal.     DIAGNOSTIC STUDIES  Labs:   Results for orders placed or performed during the hospital encounter of 09/20/23   Lactic acid (lactate)   Result Value Ref Range    Lactic Acid 1.3 0.5 - 2.0 mmol/L   Lactic acid (lactate): Repeat if initial lactic acid result is greater than 2   Result Value Ref Range    Lactic Acid 1.9 0.5 - 2.0 mmol/L   Comp Metabolic Panel   Result Value Ref Range    Sodium 133 (L) 135 - 145 mmol/L    Potassium 3.3 (L) 3.6 - 5.5 mmol/L    Chloride 110 96 - 112 mmol/L    Co2 9 (LL) 20 - 33 mmol/L    Anion Gap 14.0 7.0 - 16.0    Glucose 99 65 - 99 mg/dL    " Bun 18 8 - 22 mg/dL    Creatinine 0.76 0.50 - 1.40 mg/dL    Calcium 9.0 8.5 - 10.5 mg/dL    Correct Calcium 8.6 8.5 - 10.5 mg/dL    AST(SGOT) 9 (L) 12 - 45 U/L    ALT(SGPT) 6 2 - 50 U/L    Alkaline Phosphatase 161 (H) 30 - 99 U/L    Total Bilirubin 0.2 0.1 - 1.5 mg/dL    Albumin 4.5 3.2 - 4.9 g/dL    Total Protein 8.3 (H) 6.0 - 8.2 g/dL    Globulin 3.8 (H) 1.9 - 3.5 g/dL    A-G Ratio 1.2 g/dL   Urinalysis    Specimen: Urine   Result Value Ref Range    Color Yellow     Character Clear     Specific Gravity 1.011 <1.035    Ph 6.5 5.0 - 8.0    Glucose Negative Negative mg/dL    Ketones Negative Negative mg/dL    Protein 30 (A) Negative mg/dL    Bilirubin Negative Negative    Urobilinogen, Urine 0.2 Negative    Nitrite Negative Negative    Leukocyte Esterase Negative Negative    Occult Blood Negative Negative    Micro Urine Req Microscopic    CBC with Differential   Result Value Ref Range    WBC 12.0 (H) 4.8 - 10.8 K/uL    RBC 4.34 4.20 - 5.40 M/uL    Hemoglobin 14.0 12.0 - 16.0 g/dL    Hematocrit 43.9 37.0 - 47.0 %    .2 (H) 81.4 - 97.8 fL    MCH 32.3 27.0 - 33.0 pg    MCHC 31.9 (L) 32.2 - 35.5 g/dL    RDW 62.7 (H) 35.9 - 50.0 fL    Platelet Count 559 (H) 164 - 446 K/uL    MPV 8.9 (L) 9.0 - 12.9 fL    Neutrophils-Polys 72.10 (H) 44.00 - 72.00 %    Lymphocytes 20.00 (L) 22.00 - 41.00 %    Monocytes 4.90 0.00 - 13.40 %    Eosinophils 1.50 0.00 - 6.90 %    Basophils 0.80 0.00 - 1.80 %    Immature Granulocytes 0.70 0.00 - 0.90 %    Nucleated RBC 0.00 0.00 - 0.20 /100 WBC    Neutrophils (Absolute) 8.68 (H) 1.82 - 7.42 K/uL    Lymphs (Absolute) 2.41 1.00 - 4.80 K/uL    Monos (Absolute) 0.59 0.00 - 0.85 K/uL    Eos (Absolute) 0.18 0.00 - 0.51 K/uL    Baso (Absolute) 0.10 0.00 - 0.12 K/uL    Immature Granulocytes (abs) 0.08 0.00 - 0.11 K/uL    NRBC (Absolute) 0.00 K/uL   Prothrombin Time (INR)   Result Value Ref Range    PT 13.7 12.0 - 14.6 sec    INR 1.04 0.87 - 1.13   APTT (PTT)   Result Value Ref Range    APTT 32.3  24.7 - 36.0 sec   ESTIMATED GFR   Result Value Ref Range    GFR (CKD-EPI) 95 >60 mL/min/1.73 m 2   URINE MICROSCOPIC (W/UA)   Result Value Ref Range    WBC 0-2 /hpf    RBC 0-2 /hpf    Bacteria Negative None /hpf    Epithelial Cells Few /hpf    Hyaline Cast 0-2 /lpf     Radiology:   The attending emergency physician has independently interpreted the diagnostic imaging associated with this visit and am waiting the final reading from the radiologist.   Preliminary interpretation is a follows: CT does not show any obvious intra-abdominal abscess.  Radiologist interpretation:   CT-ABDOMEN-PELVIS WITH   Final Result      1.  Gastrojejunostomy tube is well-positioned, tip in the proximal jejunum. No fluid collections along the G-tube tract.   2.  No acute inflammatory process in the abdomen or pelvis.   3.  Nonobstructing left nephrolithiasis.   4.  Fluid distention of the right iliopsoas bursa at the level of the right hip joint. Correlate for iliopsoas bursitis.      DX-CHEST-PORTABLE (1 VIEW)   Final Result      1.  Suggestion of obstructive pulmonary disease/emphysema. No acute infiltrates.        COURSE & MEDICAL DECISION MAKING     ED Observation Status? Yes; I am placing the patient in to an observation status due to a diagnostic uncertainty as well as therapeutic intensity. Patient placed in observation status at 2:34 PM, 9/20/2023.     Observation plan is as follows: We will manage their symptoms, evaluate with diagnostic testing, and then reassess after results are reviewed     Upon Reevaluation, the patient's condition has: not improved; and will be escalated to hospitalization.    Patient discharged from ED Observation status at 6:26 PM (Time) 9/20/2023 (Date).     INITIAL ASSESSMENT, COURSE AND PLAN  Care Narrative:   2:34 PM - Patient was seen and evaluated at bedside. Patient presents to the ED for a feeding tube problem onset prior to arrival. On exam patient presented with G tube placed in left upper  abdomen with surrounding erythema. She has tenderness surrounding the feeding tube with no obvious fluctuants. .  After my exam, I discussed with the patient the plan of care, which includes treating the patient with medication for their symptoms, as well as obtaining lab work and imaging for further evaluation. Patient understands and verbalizes agreement to plan of care. Patient will be treated with Maxipime 2 g in  mL. Ordered CT-abdomen-pelvis with contrast, DX-chest, lactic acid, CMP, UA, urine culture, blood culture, CBC w/ diff, prothrombin time and APTT   to evaluate. Differential diagnoses include but not limited to: sepsis vs intraabdominal abscess vs pseudomonas cellulitis    4:05 PM - Patient was reevaluated at bedside. Discussed plan for hospitalization. Patient verbalizes understanding and agreement to this plan of care.     6:23 PM - Paged hospitalist    6:26 PM - Hospitalist responded. I discussed the patient's case and the above findings with [facility] who agrees to evaluate the patient for hospitalization.     PROBLEM LIST  Problem #1 abdominal pain with erythema around her feeding tube.  Patient presents after being on Cipro for last week for cellulitis that is likely Pseudomonas after cultures.  Patient is not improving and having worsening abdominal pain.  Because of this CT of the abdomen pelvis was obtained this does not show any intra-abdominal abscess.  I think the patient will benefit from IV antibiotics because the patient has failed oral outpatient antibiotics.    Problem #2 cachexia patient is very thin will likely still need feeding tube support.    DISPOSITION AND DISCUSSIONS  I have discussed management of the patient with the following physicians and HALIMA's:  [facility] Dr. Gotti (Hospitalist)    Discussion of management with other [facility] or appropriate source(s): Pharmacy        Barriers to care at this time, including but not limited to: None.     Decision tools and  prescription drugs considered including, but not limited to: Antibiotics Maxipime .    DISPOSITION:  Patient will be hospitalized by Dr. Gotti in guarded condition.    FINAL DIAGNOSIS  1. Cellulitis of abdominal wall    2. Pseudomonas infection    3. Generalized abdominal pain      The note accurately reflects work and decisions made by me.  Efrain Hammer M.D.  9/20/2023  7:20 PM

## 2023-09-20 NOTE — ED TRIAGE NOTES
"Chief Complaint   Patient presents with    Feeding Tube Problem     Patient to triage via wc has infected g tube, has followed up with GI and ED was prescribed antibiotics, pathologist called to notify her the antibiotics are not working and needs IV antibiotics.        /79   Pulse (!) 108   Temp 36.7 °C (98 °F) (Temporal)   Resp 20   Ht 1.6 m (5' 3\")   Wt 39 kg (86 lb)   SpO2 99%     Patient educated on ed triage process, instructed to notify staff of any new or worsening symptoms, verbalizes understanding. Patient returned to ed lobby, apologized for wait times.     Charge RN notified of sepsis score 3 and neutropenia score 2. Protocol ordered, mask applied to patient.   "

## 2023-09-21 VITALS
DIASTOLIC BLOOD PRESSURE: 63 MMHG | BODY MASS INDEX: 15.24 KG/M2 | RESPIRATION RATE: 19 BRPM | OXYGEN SATURATION: 97 % | HEIGHT: 63 IN | TEMPERATURE: 98 F | SYSTOLIC BLOOD PRESSURE: 93 MMHG | WEIGHT: 86 LBS | HEART RATE: 90 BPM

## 2023-09-21 PROBLEM — N17.9 AKI (ACUTE KIDNEY INJURY) (HCC): Status: RESOLVED | Noted: 2019-10-04 | Resolved: 2023-09-21

## 2023-09-21 PROBLEM — A41.9 SEPTIC SHOCK (HCC): Status: RESOLVED | Noted: 2019-10-11 | Resolved: 2023-09-21

## 2023-09-21 PROBLEM — R09.02 HYPOXIA: Status: RESOLVED | Noted: 2019-10-10 | Resolved: 2023-09-21

## 2023-09-21 PROBLEM — M25.649 JOINT STIFFNESS OF HAND: Status: RESOLVED | Noted: 2017-06-05 | Resolved: 2023-09-21

## 2023-09-21 PROBLEM — J96.01 ACUTE HYPOXEMIC RESPIRATORY FAILURE (HCC): Status: RESOLVED | Noted: 2019-10-10 | Resolved: 2023-09-21

## 2023-09-21 PROBLEM — R93.89 ABNORMAL CHEST X-RAY: Status: RESOLVED | Noted: 2019-10-10 | Resolved: 2023-09-21

## 2023-09-21 PROBLEM — R65.21 SEPTIC SHOCK (HCC): Status: RESOLVED | Noted: 2019-10-11 | Resolved: 2023-09-21

## 2023-09-21 PROBLEM — R65.10 SIRS (SYSTEMIC INFLAMMATORY RESPONSE SYNDROME) (HCC): Status: RESOLVED | Noted: 2022-02-21 | Resolved: 2023-09-21

## 2023-09-21 LAB
ALBUMIN SERPL BCP-MCNC: 3.2 G/DL (ref 3.2–4.9)
ALBUMIN/GLOB SERPL: 1.2 G/DL
ALP SERPL-CCNC: 115 U/L (ref 30–99)
ALT SERPL-CCNC: <5 U/L (ref 2–50)
ANION GAP SERPL CALC-SCNC: 11 MMOL/L (ref 7–16)
AST SERPL-CCNC: 7 U/L (ref 12–45)
BASOPHILS # BLD AUTO: 0.7 % (ref 0–1.8)
BASOPHILS # BLD: 0.08 K/UL (ref 0–0.12)
BILIRUB SERPL-MCNC: 0.2 MG/DL (ref 0.1–1.5)
BUN SERPL-MCNC: 15 MG/DL (ref 8–22)
CALCIUM ALBUM COR SERPL-MCNC: 8 MG/DL (ref 8.5–10.5)
CALCIUM SERPL-MCNC: 7.4 MG/DL (ref 8.5–10.5)
CHLORIDE SERPL-SCNC: 113 MMOL/L (ref 96–112)
CO2 SERPL-SCNC: 11 MMOL/L (ref 20–33)
CREAT SERPL-MCNC: 0.64 MG/DL (ref 0.5–1.4)
EOSINOPHIL # BLD AUTO: 0.2 K/UL (ref 0–0.51)
EOSINOPHIL NFR BLD: 1.8 % (ref 0–6.9)
ERYTHROCYTE [DISTWIDTH] IN BLOOD BY AUTOMATED COUNT: 61.4 FL (ref 35.9–50)
GFR SERPLBLD CREATININE-BSD FMLA CKD-EPI: 107 ML/MIN/1.73 M 2
GLOBULIN SER CALC-MCNC: 2.6 G/DL (ref 1.9–3.5)
GLUCOSE SERPL-MCNC: 130 MG/DL (ref 65–99)
HCT VFR BLD AUTO: 31.2 % (ref 37–47)
HGB BLD-MCNC: 10 G/DL (ref 12–16)
IMM GRANULOCYTES # BLD AUTO: 0.06 K/UL (ref 0–0.11)
IMM GRANULOCYTES NFR BLD AUTO: 0.5 % (ref 0–0.9)
LACTATE SERPL-SCNC: 0.9 MMOL/L (ref 0.5–2)
LYMPHOCYTES # BLD AUTO: 2.45 K/UL (ref 1–4.8)
LYMPHOCYTES NFR BLD: 22.4 % (ref 22–41)
MCH RBC QN AUTO: 31.7 PG (ref 27–33)
MCHC RBC AUTO-ENTMCNC: 32.1 G/DL (ref 32.2–35.5)
MCV RBC AUTO: 99 FL (ref 81.4–97.8)
MONOCYTES # BLD AUTO: 0.63 K/UL (ref 0–0.85)
MONOCYTES NFR BLD AUTO: 5.8 % (ref 0–13.4)
NEUTROPHILS # BLD AUTO: 7.53 K/UL (ref 1.82–7.42)
NEUTROPHILS NFR BLD: 68.8 % (ref 44–72)
NRBC # BLD AUTO: 0 K/UL
NRBC BLD-RTO: 0 /100 WBC (ref 0–0.2)
PHOSPHATE SERPL-MCNC: 2.3 MG/DL (ref 2.5–4.5)
PLATELET # BLD AUTO: 425 K/UL (ref 164–446)
PMV BLD AUTO: 9.1 FL (ref 9–12.9)
POTASSIUM SERPL-SCNC: 3.1 MMOL/L (ref 3.6–5.5)
PROT SERPL-MCNC: 5.8 G/DL (ref 6–8.2)
RBC # BLD AUTO: 3.15 M/UL (ref 4.2–5.4)
SODIUM SERPL-SCNC: 135 MMOL/L (ref 135–145)
WBC # BLD AUTO: 11 K/UL (ref 4.8–10.8)

## 2023-09-21 PROCEDURE — A9270 NON-COVERED ITEM OR SERVICE: HCPCS

## 2023-09-21 PROCEDURE — 96375 TX/PRO/DX INJ NEW DRUG ADDON: CPT

## 2023-09-21 PROCEDURE — 700102 HCHG RX REV CODE 250 W/ 637 OVERRIDE(OP): Performed by: FAMILY MEDICINE

## 2023-09-21 PROCEDURE — 99222 1ST HOSP IP/OBS MODERATE 55: CPT | Mod: GC | Performed by: FAMILY MEDICINE

## 2023-09-21 PROCEDURE — 85025 COMPLETE CBC W/AUTO DIFF WBC: CPT

## 2023-09-21 PROCEDURE — 96366 THER/PROPH/DIAG IV INF ADDON: CPT

## 2023-09-21 PROCEDURE — A9270 NON-COVERED ITEM OR SERVICE: HCPCS | Performed by: FAMILY MEDICINE

## 2023-09-21 PROCEDURE — 700105 HCHG RX REV CODE 258

## 2023-09-21 PROCEDURE — 96376 TX/PRO/DX INJ SAME DRUG ADON: CPT

## 2023-09-21 PROCEDURE — 97602 WOUND(S) CARE NON-SELECTIVE: CPT

## 2023-09-21 PROCEDURE — 36415 COLL VENOUS BLD VENIPUNCTURE: CPT

## 2023-09-21 PROCEDURE — 700102 HCHG RX REV CODE 250 W/ 637 OVERRIDE(OP)

## 2023-09-21 PROCEDURE — 700111 HCHG RX REV CODE 636 W/ 250 OVERRIDE (IP): Mod: JZ

## 2023-09-21 PROCEDURE — RXMED WILLOW AMBULATORY MEDICATION CHARGE

## 2023-09-21 PROCEDURE — 84100 ASSAY OF PHOSPHORUS: CPT

## 2023-09-21 PROCEDURE — 80053 COMPREHEN METABOLIC PANEL: CPT

## 2023-09-21 PROCEDURE — 83605 ASSAY OF LACTIC ACID: CPT

## 2023-09-21 PROCEDURE — 96365 THER/PROPH/DIAG IV INF INIT: CPT

## 2023-09-21 PROCEDURE — 700111 HCHG RX REV CODE 636 W/ 250 OVERRIDE (IP)

## 2023-09-21 PROCEDURE — 99232 SBSQ HOSP IP/OBS MODERATE 35: CPT | Performed by: SPECIALIST

## 2023-09-21 RX ORDER — PAROXETINE 30 MG/1
60 TABLET, FILM COATED ORAL EVERY EVENING
Status: DISCONTINUED | OUTPATIENT
Start: 2023-09-21 | End: 2023-09-21

## 2023-09-21 RX ORDER — GAUZE BANDAGE 2" X 2"
100 BANDAGE TOPICAL DAILY
Status: DISCONTINUED | OUTPATIENT
Start: 2023-09-22 | End: 2023-09-21 | Stop reason: HOSPADM

## 2023-09-21 RX ORDER — LORAZEPAM 2 MG/ML
0.5 INJECTION INTRAMUSCULAR EVERY 4 HOURS PRN
Status: DISCONTINUED | OUTPATIENT
Start: 2023-09-21 | End: 2023-09-21

## 2023-09-21 RX ORDER — GAUZE BANDAGE 2" X 2"
100 BANDAGE TOPICAL DAILY
Status: DISCONTINUED | OUTPATIENT
Start: 2023-09-22 | End: 2023-09-21

## 2023-09-21 RX ORDER — ACETAMINOPHEN 325 MG/1
650 TABLET ORAL EVERY 4 HOURS PRN
Status: DISCONTINUED | OUTPATIENT
Start: 2023-09-21 | End: 2023-09-21 | Stop reason: HOSPADM

## 2023-09-21 RX ORDER — POTASSIUM CHLORIDE 7.45 MG/ML
10 INJECTION INTRAVENOUS
Status: COMPLETED | OUTPATIENT
Start: 2023-09-21 | End: 2023-09-21

## 2023-09-21 RX ORDER — OMEPRAZOLE 20 MG/1
20 CAPSULE, DELAYED RELEASE ORAL DAILY
Status: DISCONTINUED | OUTPATIENT
Start: 2023-09-21 | End: 2023-09-21 | Stop reason: HOSPADM

## 2023-09-21 RX ORDER — ACETAMINOPHEN 325 MG/1
650 TABLET ORAL EVERY 4 HOURS PRN
Status: DISCONTINUED | OUTPATIENT
Start: 2023-09-21 | End: 2023-09-21

## 2023-09-21 RX ORDER — POTASSIUM CHLORIDE 20 MEQ/1
20 TABLET, EXTENDED RELEASE ORAL ONCE
Status: DISCONTINUED | OUTPATIENT
Start: 2023-09-21 | End: 2023-09-21

## 2023-09-21 RX ORDER — PAROXETINE 30 MG/1
60 TABLET, FILM COATED ORAL EVERY EVENING
Status: DISCONTINUED | OUTPATIENT
Start: 2023-09-21 | End: 2023-09-21 | Stop reason: HOSPADM

## 2023-09-21 RX ORDER — CIPROFLOXACIN 750 MG/1
750 TABLET, FILM COATED ORAL 2 TIMES DAILY
Qty: 20 TABLET | Refills: 0 | Status: ACTIVE | OUTPATIENT
Start: 2023-09-21 | End: 2023-10-27

## 2023-09-21 RX ORDER — LANSOPRAZOLE 30 MG/1
30 TABLET, ORALLY DISINTEGRATING, DELAYED RELEASE ORAL DAILY
Status: DISCONTINUED | OUTPATIENT
Start: 2023-09-21 | End: 2023-09-21

## 2023-09-21 RX ORDER — LORAZEPAM 2 MG/ML
1 INJECTION INTRAMUSCULAR
Status: DISCONTINUED | OUTPATIENT
Start: 2023-09-21 | End: 2023-09-21 | Stop reason: HOSPADM

## 2023-09-21 RX ORDER — OXYCODONE HYDROCHLORIDE AND ACETAMINOPHEN 5; 325 MG/1; MG/1
1 TABLET ORAL EVERY 8 HOURS PRN
Status: DISCONTINUED | OUTPATIENT
Start: 2023-09-21 | End: 2023-09-21 | Stop reason: HOSPADM

## 2023-09-21 RX ORDER — OXYCODONE HYDROCHLORIDE AND ACETAMINOPHEN 5; 325 MG/1; MG/1
1 TABLET ORAL EVERY 8 HOURS PRN
Status: DISCONTINUED | OUTPATIENT
Start: 2023-09-21 | End: 2023-09-21

## 2023-09-21 RX ADMIN — HYDROMORPHONE HYDROCHLORIDE 0.5 MG: 1 INJECTION, SOLUTION INTRAMUSCULAR; INTRAVENOUS; SUBCUTANEOUS at 13:58

## 2023-09-21 RX ADMIN — POTASSIUM CHLORIDE 10 MEQ: 7.46 INJECTION, SOLUTION INTRAVENOUS at 09:21

## 2023-09-21 RX ADMIN — HYDROMORPHONE HYDROCHLORIDE 0.5 MG: 1 INJECTION, SOLUTION INTRAMUSCULAR; INTRAVENOUS; SUBCUTANEOUS at 09:22

## 2023-09-21 RX ADMIN — HYDROMORPHONE HYDROCHLORIDE 0.5 MG: 1 INJECTION, SOLUTION INTRAMUSCULAR; INTRAVENOUS; SUBCUTANEOUS at 05:54

## 2023-09-21 RX ADMIN — NICOTINE 14 MG: 14 PATCH TRANSDERMAL at 05:42

## 2023-09-21 RX ADMIN — POTASSIUM CHLORIDE 10 MEQ: 7.46 INJECTION, SOLUTION INTRAVENOUS at 10:00

## 2023-09-21 RX ADMIN — POTASSIUM CHLORIDE 10 MEQ: 7.46 INJECTION, SOLUTION INTRAVENOUS at 10:15

## 2023-09-21 RX ADMIN — LORAZEPAM 1 MG: 2 INJECTION INTRAMUSCULAR; INTRAVENOUS at 13:01

## 2023-09-21 RX ADMIN — LORAZEPAM 0.5 MG: 2 INJECTION INTRAMUSCULAR; INTRAVENOUS at 10:12

## 2023-09-21 RX ADMIN — HYDROMORPHONE HYDROCHLORIDE 0.5 MG: 1 INJECTION, SOLUTION INTRAMUSCULAR; INTRAVENOUS; SUBCUTANEOUS at 02:48

## 2023-09-21 RX ADMIN — OMEPRAZOLE 20 MG: 20 CAPSULE, DELAYED RELEASE ORAL at 10:19

## 2023-09-21 RX ADMIN — CEFEPIME 2 G: 2 INJECTION, POWDER, FOR SOLUTION INTRAVENOUS at 04:02

## 2023-09-21 RX ADMIN — SODIUM CHLORIDE, POTASSIUM CHLORIDE, SODIUM LACTATE AND CALCIUM CHLORIDE: 600; 310; 30; 20 INJECTION, SOLUTION INTRAVENOUS at 08:06

## 2023-09-21 RX ADMIN — OMEPRAZOLE 20 MG: 20 CAPSULE, DELAYED RELEASE ORAL at 05:42

## 2023-09-21 RX ADMIN — POTASSIUM CHLORIDE 10 MEQ: 7.46 INJECTION, SOLUTION INTRAVENOUS at 08:08

## 2023-09-21 ASSESSMENT — PAIN DESCRIPTION - PAIN TYPE
TYPE: ACUTE PAIN
TYPE: ACUTE PAIN

## 2023-09-21 NOTE — ASSESSMENT & PLAN NOTE
Chronic, BMI 15.23, in setting of GJ tube use for partially meeting caloric needs.  Patient reportedly only uses GJ tube for boost supplements, otherwise gets most of her nutrition via p.o.  Patient is at high risk for refeeding syndrome.    Plan:  - Oral phos supplementation started  - Will ctm electrolytes  - Nutrition consult, appreciate the recommendations and help

## 2023-09-21 NOTE — DISCHARGE SUMMARY
"[facility] Discharge Summary    Attending: Dr. Johansen   Senior Resident: Dr. Joaquin Buckner  Contact Number: [phone]    CHIEF COMPLAINT ON ADMISSION  Chief Complaint   Patient presents with    Feeding Tube Problem     Patient to triage via wc has infected g tube, has followed up with GI and ED was prescribed antibiotics, pathologist called to notify her the antibiotics are not working and needs IV antibiotics.        Reason for Admission  Sent by MD     Admission Date  9/20/2023    CODE STATUS  DNAR/DNI    HPI & HOSPITAL COURSE  This is a 53 y.o. female here with pseudomonal cellulitis infection of her GJ tube.    Discussed case with ID pharmacy who recommended either pulling the tube or replacing it. After discussion with the patient she would prefer it removed as she has not used it for nutrition for a \"long time\".  ID pharm recommended then 750 mg Cipro BID x 7-14 days. Pt to not eat dairy or take multivitamin on discharge.    GI kindly agreed to see patient and remove GJ tube. Pt tolerated well      Therefore, she is discharged in good and stable condition to home with close outpatient follow-up.    The patient recovered much more quickly than anticipated on admission.    Discharge Date  9/21/2023    Physical Exam on Day of Discharge  Physical Exam  Vitals and nursing note reviewed.   Constitutional:       Comments: Thin, anxious   HENT:      Head: Normocephalic and atraumatic.      Right Ear: External ear normal.      Left Ear: External ear normal.      Nose: Nose normal.      Mouth/Throat:      Mouth: Mucous membranes are moist.      Pharynx: Oropharynx is clear.   Eyes:      Conjunctiva/sclera: Conjunctivae normal.      Pupils: Pupils are equal, round, and reactive to light.   Cardiovascular:      Rate and Rhythm: Normal rate and regular rhythm.      Pulses: Normal pulses.      Heart sounds: Normal heart sounds.   Pulmonary:      Effort: Pulmonary effort is normal.      Breath sounds: " Normal breath sounds.   Abdominal:      General: Abdomen is flat.      Comments: GJ tube in place with yellow green blue discharge consistent with pseudomonas. Minimal surrounding erythema. No edema, no fluctuance   Musculoskeletal:         General: Normal range of motion.      Cervical back: Normal range of motion and neck supple.   Skin:     General: Skin is warm and dry.      Capillary Refill: Capillary refill takes less than 2 seconds.   Neurological:      General: No focal deficit present.      Mental Status: She is alert and oriented to person, place, and time. Mental status is at baseline.   Psychiatric:         Mood and Affect: Mood normal.         Behavior: Behavior normal.         Thought Content: Thought content normal.         Judgment: Judgment normal.         FOLLOW UP ITEMS POST DISCHARGE  GI  PCP    DISCHARGE DIAGNOSES  Principal Problem:    Cellulitis of abdominal wall (POA: Yes)  Active Problems:    Severe protein-calorie malnutrition (HCC) (POA: Yes)    Macrocytic anemia (POA: Yes)    Hypokalemia (POA: Yes)    Alkaline phosphatase elevation (POA: Yes)    Rheumatoid arthritis (HCC) (POA: Yes)      Overview: Diagnois update 10/1/2016    Hypophosphatemia (POA: Yes)    Hyponatremia (POA: Yes)    Tobacco dependence (POA: Yes)  Resolved Problems:    * No resolved hospital problems. *      FOLLOW UP  Future Appointments   Date Time Provider Department Center   9/29/2023  1:30 PM Sunitha Stahl M.D. [facility]  [address]  [facility] [address]  [phone]    Follow up  follow up after discharge      MEDICATIONS ON DISCHARGE     Medication List        CHANGE how you take these medications        Instructions   ciprofloxacin 750 MG Tabs  What changed:   medication strength  how much to take  Commonly known as: Cipro   Doctor's comments: Recommended regimen as per Michaelle. Thanks!  Take 1 Tablet by mouth 2 times a day for 10 days.  Dose: 750 mg            CONTINUE  taking these medications        Instructions   acetaminophen 500 MG Tabs  Commonly known as: Tylenol   Take 1,000 mg by mouth one time as needed for Mild Pain. 2 tablets = 1,000 MG  Dose: 1,000 mg     esomeprazole 40 MG delayed-release capsule  Commonly known as: NexIUM   Take 1 Capsule by mouth daily for 30 days.  Dose: 40 mg     oxyCODONE-acetaminophen 7.5-325 MG per tablet  Commonly known as: Percocet   Take 1 Tablet by mouth every 6 hours as needed for Severe Pain for up to 30 days.  Dose: 1 Tablet     PARoxetine 30 MG Tabs  Commonly known as: Paxil   Take 60 mg by mouth every evening. 2 tablets = 60 mg.  Dose: 60 mg     QUEtiapine 100 MG Tabs  Commonly known as: SEROquel   Take 1 Tablet by mouth every evening.  Dose: 100 mg     senna-docusate 8.6-50 MG Tabs  Commonly known as: Pericolace Or Senokot S   Take 1 Tablet by mouth 2 times a day.  Dose: 1 Tablet            STOP taking these medications      metroNIDAZOLE 500 MG Tabs  Commonly known as: Flagyl              Allergies  Allergies   Allergen Reactions    Penicillins Anaphylaxis and Hives     Tolerates cephalosporins; reports throat swelling with PCN    Aripiprazole Nausea     Spasms, shaking      Nitrous Oxide Vomiting       DIET  Orders Placed This Encounter   Procedures    Diet: Diet Tube Feed; Formula: Bolus Only; Select Bolus (If Indicated): Boost Plus Carton; Bolus Frequency: QID; Number of Cartons Per Day: Four     Continue pt's home TF regimen.     Standing Status:   Standing     Number of Occurrences:   1     Order Specific Question:   Diet     Answer:   Diet Tube Feed [35]     Order Specific Question:   Formula:     Answer:   Bolus Only     Order Specific Question:   Route     Answer:   Gtube/PEG     Order Specific Question:   Select Bolus (If Indicated):     Answer:   Boost Plus Carton     Order Specific Question:   Bolus Frequency     Answer:   QID     Order Specific Question:   Number of Cartons Per Day:     Answer:   Four    Diet Order Diet:  Level 7 - Easy to Chew; Liquid level: Level 0 - Thin     Standing Status:   Standing     Number of Occurrences:   1     Order Specific Question:   Diet:     Answer:   Level 7 - Easy to Chew [22]     Order Specific Question:   Liquid level     Answer:   Level 0 - Thin    Diet NPO Restrict to: Sips with Medications     Standing Status:   Standing     Number of Occurrences:   8     Order Specific Question:   Diet NPO Restrict to:     Answer:   Sips with Medications [3]       ACTIVITY  As tolerated.  Weight bearing as tolerated    CONSULTATIONS  GI  ID pharm    PROCEDURES  GJ tube removal    LABORATORY  Lab Results   Component Value Date    SODIUM 135 09/21/2023    POTASSIUM 3.1 (L) 09/21/2023    CHLORIDE 113 (H) 09/21/2023    CO2 11 (L) 09/21/2023    GLUCOSE 130 (H) 09/21/2023    BUN 15 09/21/2023    CREATININE 0.64 09/21/2023    CREATININE 0.7 11/29/2008        Lab Results   Component Value Date    WBC 11.0 (H) 09/21/2023    HEMOGLOBIN 10.0 (L) 09/21/2023    HEMATOCRIT 31.2 (L) 09/21/2023    PLATELETCT 425 09/21/2023        Total time of the discharge process exceeds 30 minutes.

## 2023-09-21 NOTE — ED NOTES
Pt ambulatory up to bathroom and back to bed, reconnected to monitor. Pt rates pain 8/10, declined offered PRN medication at this time

## 2023-09-21 NOTE — ASSESSMENT & PLAN NOTE
.2, likely secondary to malnutrition in setting of chronic protein calorie malnutrition.    Plan:  -Start multivitamin  -Start thiamine supplements 100 mg daily

## 2023-09-21 NOTE — ED NOTES
"Patient requesting pain medication for abdominal pain 9/10. Refusing PO medication at this time - states that if no IV pain medication is ordered she will leave AMA \"because there's no point in staying if I can control my pain better at home.\" Resident Dr. Gotti notified.  "

## 2023-09-21 NOTE — DISCHARGE INSTRUCTIONS
Discharge Instructions    Discharged to home by car with relative. Discharged via wheelchair, hospital escort: Yes.  Special equipment needed: Not Applicable    Be sure to schedule a follow-up appointment with your primary care doctor or any specialists as instructed.     Discharge Plan:   Diet Plan: Discussed  Activity Level: Discussed  Confirmed Follow up Appointment: Appointment Scheduled  Confirmed Symptoms Management: Discussed  Medication Reconciliation Updated: Yes    I understand that a diet low in cholesterol, fat, and sodium is recommended for good health. Unless I have been given specific instructions below for another diet, I accept this instruction as my diet prescription.   Other diet: Regular diet    Special Instructions: None    -Is this patient being discharged with medication to prevent blood clots?  No    Is patient discharged on Warfarin / Coumadin?   No

## 2023-09-21 NOTE — PROGRESS NOTES
IV dc'd.  Discharge instructions given to patient; patient verbalizes understanding, all questions answered.  Copy of DC summary provided, signed copy in chart.  1 prescriptions electronically sent to pt's pharmacy/provided to patient, copies in chart.  Pt states personal belongings are in possession.  Pt escorted off unit by this RN without incident.

## 2023-09-21 NOTE — ASSESSMENT & PLAN NOTE
Subacute, confined to GJ tube site with no evidence of abscess on CT. Wound cultures growing Pseudomonas sensitive to cefepime.  GJ tube function remains intact.    Plan:  -Start IV cefepime 2 times every 12 hours   -Patient high risk for leaving AMA due to need for returning home to take care of her children.  Day team pursue placement of PICC line as soon as possible if deemed appropriate.  -Trend WBC  -Repeat Lactic acid   -Start LR at 1.5x maintenance rate  -Low threshold for repeat CT abdomen  -Day team consider surgical consult for replacement of GJ tube as it is a potential nidus for infection

## 2023-09-21 NOTE — ED NOTES
Bedside report with LEANNE Tyler.  Pt resting on edge of [facility] and connected to monitor.  Pt on room air.  Pt is moderate fall risk, interventions in place. Pt aware of POC. Call light within reach.

## 2023-09-21 NOTE — H&P
[facility] FAMILY MEDICINE HISTORY AND PHYSICAL    PATIENT ID:  NAME:  Mary Martinez  MRN:               [id number]  YOB: 1970    Date of Admission: 9/20/2023     Attending: Ignacio Johansen M.D.    Resident: Stormy Gotti M.D. (PGY-2)    Primary Care Physician:  Fidencio Christopher D.O.    CC: Feeding tube problem    HPI: Mary Martinez is a 53 y.o. female with a significant past medical history of RA, perforation of posterior duodenum (2019), GJ-tube (placed in June) and severe protein calorie malnutrition, who presented with approximately 2-week history of abdominal pain surrounding the GJ-tube site secondary to cellulitis for which she was started on antibiotics (p.o. Cipro) on 9/14.  Since starting the antibiotics, she reports little to no improvement and was informed today of wound cultures growing Pseudomonas which will require IV antibiotics and was informed to report to the emergency room.  Patient reports associated fevers, chills, nausea and ongoing abdominal pain.  She denies any headaches, chest pain, diarrhea or dysuria.  Patient expressed concerns regarding a prolonged hospital stay for IV antibiotics given that she has 2 children at home that depend on her, 1 of which reportedly has Down syndrome.  If extended antibiotic course is anticipated, she would like to have a PICC line placed which she was informed would be able to be cared for at home with her currently arranged home health.  G-tube remains functional although reportedly only used for boost drinks.  She states she takes in the majority of her calories p.o.      ER Course:  In the ED, vital signs notable for mild tachycardia with HR up to 108 on initial presentation, otherwise vital signs within normal limits.  Labs: CBC notable for WBC 12, .2 and platelet count 559.  CMP notable for sodium 133, potassium 3.3, bicarb 9, AST 9, alkaline phosphatase 161.  Lactic acid 1.3, repeat 1.9.  Coag studies normal.  Urinalysis notable for  protein 30 and hyaline cast 0-2, otherwise unremarkable.  CT abdomen showed correct placement of GJ tube, no acute inflammatory process in abdomen or pelvis, nonobstructive left nephrolithiasis and fluid distention on the right iliopsoas bursa at the level of the right hip joint correlating for iliopsoas bursitis.  Chest x-ray consistent with obstructive pulmonary disease/emphysema, no acute infiltrates.  Patient was given a one-time dose of cefepime, 1 L LR bolus, morphine 4 mg x 2, and Zofran.    REVIEW OF SYSTEMS:   Ten systems reviewed and were negative except as noted in the HPI.                PAST MEDICAL HISTORY:  Past Medical History:   Diagnosis Date    Acute renal failure (ARF) (Formerly [facility])     Allergy     Anemia     Anxiety     Arthritis     Rheumatoid -follows with rheumatologist. Has several fractures due to RA.    ASTHMA     inhalers prn    Blood transfusion without reported diagnosis     Bowel habit changes     4/24/20-constipation and diarrhea.    Bronchitis last approx 2018    Chronic pain 04/24/2020    Due to RA    Dental disorder     no teeth upper-does not wear her denture. Broken teeth on bottom.    Depression     GERD (gastroesophageal reflux disease)     GIB (gastrointestinal bleeding)     Head ache     Heart burn     taking famotidine    Hiatus hernia syndrome     History of pancreatitis     Hypokalemia     Hyponatremia     Idiopathic chronic pancreatitis (HCC)     Indigestion     taking famotidine    Intractable nausea and vomiting     Kidney disease     Pain     CPS-everywhere    Pneumonia 10/2019    PONV (postoperative nausea and vomiting)     Psychiatric problem     anxiety and depression    Rheumatoid aortitis     Rheumatoid arthritis(714.0) 2003    SMAS (superior mesenteric artery syndrome) (Formerly [facility])     Substance abuse (Formerly [facility])        PAST SURGICAL HISTORY:  Past Surgical History:   Procedure Laterality Date    MI UPPER GI ENDOSCOPY,DIAGNOSIS N/A 8/16/2023    Procedure: GASTROSCOPY;  Surgeon:  Blossom Peres M.D.;  Location: SURGERY SAME DAY [facility];  Service: Gastroenterology    PA PLACE PERCUT GASTROSTOMY TUBE N/A 6/12/2023    Procedure: INSERTION, PEG TUBE - PEG AND J TUBE PLACEMENT;  Surgeon: Marilyn Anderson M.D.;  Location: SURGERY SAME DAY [facility];  Service: Gastroenterology    PA UPPER GI ENDOSCOPY,DIAGNOSIS  6/12/2023    Procedure: GASTROSCOPY;  Surgeon: Marilyn Anderson M.D.;  Location: SURGERY SAME DAY [facility];  Service: Gastroenterology    PA UPPER GI ENDOSCOPY,DIAGNOSIS N/A 9/9/2022    Procedure: ENDOSCOPIC ULTRASOUND- UPPER;  Surgeon: Jorge Brooke M.D.;  Location: [facility];  Service: EUS    EGD W/ENDOSCOPIC ULTRASOUND N/A 9/9/2022    Procedure: EGD, WITH ENDOSCOPIC US;  Surgeon: Jorge Brooke M.D.;  Location: [facility];  Service: EUS    PA ENDOSCOPIC US EXAM, ESOPH  4/29/2020    Procedure: EGD, WITH ENDOSCOPIC US;  Surgeon: Jorge Brooke M.D.;  Location: [facility];  Service: Gastroenterology    GASTROSCOPY-ENDO  4/29/2020    Procedure: GASTROSCOPY;  Surgeon: Jorge Brooke M.D.;  Location: [facility];  Service: Gastroenterology    EGD WITH ASP/BX  4/29/2020    Procedure: EGD, WITH ASPIRATION BIOPSY - POSS FNA;  Surgeon: Jorge Brooke M.D.;  Location: [facility];  Service: Gastroenterology    PA EXPLORATORY OF ABDOMEN N/A 10/4/2019    Procedure: LAPAROTOMY, EXPLORATORY AND REPAIR OF DUODENAL PERFORATION;  Surgeon: James Dumont M.D.;  Location: [facility];  Service: General    COLONOSCOPY  2018    with upper endoscopy    FINGER ARTHROPLASTY Right 6/5/2017    Procedure: FINGER ARTHROPLASTY - LONG, RING AND SMALL VOLAR PLATE;  Surgeon: Lobo Rosen M.D.;  Location: SURGERY SAME DAY [facility];  Service:     FINGER AMPUTATION  6/5/2017    Procedure: FINGER AMPUTATION - LONG, RING AND SMALL AT THE PROXIMAL INTERPHALANGEAL JOINT;  Surgeon: Lobo SMILEY  FRANKLIN Rosen;  Location: SURGERY SAME DAY [facility];  Service:     FINGER ARTHROPLASTY Right 4/17/2017    Procedure: FINGER ARTHROPLASTY ;  Surgeon: Lobo Rosen M.D.;  Location: SURGERY SAME DAY [facility];  Service:     FINGER AMPUTATION Right 9/14/2016    Procedure: FINGER AMPUTATION INDEX;  Surgeon: Lobo Rosen M.D.;  Location: SURGERY SAME DAY [facility];  Service:     IRRIGATION & DEBRIDEMENT ORTHO Right 9/11/2016    Procedure: IRRIGATION & DEBRIDEMENT ORTHO - right index finger;  Surgeon: Madhu Rosen M.D.;  Location: SURGERY [facility];  Service:     FUSION, PIP JOINT, TOE Right 8/29/2016    Procedure: RE-DO INDEX FINGER PROXIMAL INTERPHALANGEAL ARTHRODESIS;  Surgeon: Lobo Rosen M.D.;  Location: SURGERY SAME DAY [facility];  Service:     BONE GRAFT Right 8/29/2016    Procedure: BONE GRAFT - DISTAL RADIUS ;  Surgeon: Lobo Rosen M.D.;  Location: SURGERY SAME DAY [facility];  Service:     ARTHRODESIS Right 5/6/2016    Procedure: ARTHRODESIS INDEX FINGER PROXIMAL INTERPHALANGEAL;  Surgeon: Lobo Rosen M.D.;  Location: SURGERY SAME DAY [facility];  Service:     FOOT RECONSTRUCTION RHEUMATIC Left 7/29/2015    Procedure: FOOT RECONSTRUCTION RHEUMATIC;  Surgeon: Heriberto Alves M.D.;  Location: [facility];  Service:     TOE FUSION Right 5/27/2015    Procedure: TOE FUSION 1ST METATARSALPHALANGEAL JOINT;  Surgeon: Heriberto Alves M.D.;  Location: [facility];  Service:     BONE SPUR EXCISION  5/27/2015    Procedure: BONE SPUR EXCISION METATARSAL HEAD 2-3;  Surgeon: Heriberto Alves M.D.;  Location: SURGERY [facility];  Service:     HAMMERTOE CORRECTION  5/27/2015    Procedure: HAMMERTOE CORRECTION AND SOFT TISSUE RE-ALINGMENT 2-3 ;  Surgeon: Heriberto Alves M.D.;  Location: [facility];  Service:     DENTAL EXTRACTION(S)  2014    all of upper teeth    ABDOMINAL ABSCESS DRAINAGE  9/27/2011     Performed by VERO WRIGHT at SURGERY [facility] ORS    EMANUEL BY LAPAROSCOPY  9/27/2011    Performed by VERO WRIGHT at SURGERY [facility] ORS    APPENDECTOMY  2011    Found out it had ruptured prior to abcess surgery    REPEAT C SECTION  2008    REPEAT C SECTION  2005    REPEAT C SECTION  1999    PRIMARY C SECTION  1991    TONSILLECTOMY  1982    WRIST EXPLORATION Left 1980's    fractured wrist-no hardware       FAMILY HISTORY:  Family History   Problem Relation Age of Onset    Cancer Mother     Heart Disease Mother     Hypertension Mother     Heart Disease Father     Hypertension Father        SOCIAL HISTORY:   Pt lives in in [place] with her 2 children, 1 of which has Down syndrome and is dependent on her.    Tobacco use: Half pack daily  ETOH use: Denies  Drug use: Denies    DIET:   Orders Placed This Encounter   Procedures    Diet Order Diet: Level 7 - Easy to Chew; Liquid level: Level 0 - Thin     Standing Status:   Standing     Number of Occurrences:   1     Order Specific Question:   Diet:     Answer:   Level 7 - Easy to Chew [22]     Order Specific Question:   Liquid level     Answer:   Level 0 - Thin       ALLERGIES:  Allergies   Allergen Reactions    Penicillins Anaphylaxis and Hives     Tolerates cephalosporins; reports throat swelling with PCN    Aripiprazole Nausea     Spasms, shaking      Nitrous Oxide Vomiting       OUTPATIENT MEDICATIONS:    Current Facility-Administered Medications:     Pharmacy Consult Request, 1 Each, Other, PHARMACY TO DOSE, Efrain Hammer M.D.    enoxaparin (Lovenox) inj 30 mg, 30 mg, Subcutaneous, DAILY AT 1800, Stormy Gotti M.D., 30 mg at 09/20/23 2025    labetalol (Normodyne/Trandate) injection 10 mg, 10 mg, Intravenous, Q4HRS PRN, Stormy Gotti M.D.    [START ON 9/21/2023] nicotine (Nicoderm) 14 MG/24HR 14 mg, 14 mg, Transdermal, Daily-0600 **AND** Nicotine Replacement Patient Education Materials, , , Once **AND** nicotine polacrilex (Nicorette) 2 MG  piece 2 mg, 2 mg, Oral, Q HOUR PRN, Stormy Gotti M.D.    [START ON 9/21/2023] cefepime (Maxipime) 2 g in  mL IVPB, 2 g, Intravenous, Q12HRS, Stormy Gotti M.D.    oxyCODONE-acetaminophen (Percocet) 5-325 MG per tablet 1 Tablet, 1 Tablet, Oral, Q8HRS PRN, Stormy Gotti M.D.    [Held by provider] QUEtiapine (SEROquel) tablet 100 mg, 100 mg, Oral, Q EVENING, Stormy Gotti M.D.    acetaminophen (Tylenol) tablet 650 mg, 650 mg, Oral, Q4HRS PRN, Stormy Gotti M.D.    [START ON 9/21/2023] omeprazole (PriLOSEC) capsule 20 mg, 20 mg, Oral, DAILY, Stormy Gotti M.D.    [START ON 9/21/2023] thiamine (Vitamin B-1) tablet 100 mg, 100 mg, Oral, DAILY, Stormy Gotti M.D.    [START ON 9/21/2023] multivitamin tablet 1 Tablet, 1 Tablet, Oral, DAILY, Stormy Gotti M.D.    potassium chloride SA (Kdur) tablet 20 mEq, 20 mEq, Oral, Once, Stormy Gotti M.D.    lactated ringers infusion, , Intravenous, Continuous, Stormy Gotti M.D.    Current Outpatient Medications:     ciprofloxacin (CIPRO) 500 MG Tab, Take 1 Tablet by mouth 2 times a day for 10 days., Disp: 20 Tablet, Rfl: 0    metroNIDAZOLE (FLAGYL) 500 MG Tab, Take 1 Tablet by mouth 2 times a day for 7 days., Disp: 14 Tablet, Rfl: 0    senna-docusate (PERICOLACE OR SENOKOT S) 8.6-50 MG Tab, Take 1 Tablet by mouth 2 times a day., Disp: , Rfl:     acetaminophen (TYLENOL) 500 MG Tab, Take 1,000 mg by mouth one time as needed for Mild Pain. 2 tablets = 1,000 MG, Disp: , Rfl:     esomeprazole (NEXIUM) 40 MG delayed-release capsule, Take 1 Capsule by mouth daily for 30 days., Disp: 60 Capsule, Rfl: 0    oxyCODONE-acetaminophen (PERCOCET) 7.5-325 MG per tablet, Take 1 Tablet by mouth every 6 hours as needed for Severe Pain for up to 30 days., Disp: 120 Tablet, Rfl: 0    PARoxetine (PAXIL) 30 MG Tab, Take 60 mg by mouth every evening. 2 tablets = 60 mg., Disp: , Rfl:     QUEtiapine (SEROQUEL) 100 MG Tab, Take 1 Tablet by mouth every evening.,  Disp: 60 Tablet, Rfl: 3    PHYSICAL EXAM:  Vitals:    23   BP: 101/66  112/66 107/70   Pulse:  73 (!) 116 74   Resp:  12     Temp:       TempSrc:       SpO2:  97% 93% 98%   Weight:       Height:       , Temp (24hrs), Av.7 °C (98 °F), Min:36.7 °C (98 °F), Max:36.7 °C (98 °F)  , Pulse Oximetry: 98 %, O2 (LPM): 0    General: Pt resting in NAD, cooperative    Skin:  Pink, warm and dry.  Surrounding erythema around GJ-tube site  HEENT: NC/AT. PERRL. EOMI. MMM. No nasal discharge. Oropharynx nonerythematous without exudate/plaques, poor dentition  Neck:  Supple without lymphadenopathy or rigidity.   Lungs:  Symmetrical.  CTAB with no W/R/R.    Cardiovascular:  S1/S2 RRR without M/R/G.  Abdomen: Abdomen soft, mild tenderness to palpation surrounding GJ-tube site  Extremities:  Full range of motion. No gross deformities noted.   Neuro:  Muscle tone is normal. Cranial nerves II-XII grossly intact. 2+ DTRs.         LAB TESTS:   Recent Labs     23  1421   WBC 12.0*   RBC 4.34   HEMOGLOBIN 14.0   HEMATOCRIT 43.9   .2*   MCH 32.3   RDW 62.7*   PLATELETCT 559*   MPV 8.9*   NEUTSPOLYS 72.10*   LYMPHOCYTES 20.00*   MONOCYTES 4.90   EOSINOPHILS 1.50   BASOPHILS 0.80         Recent Labs     23  1421   SODIUM 133*   POTASSIUM 3.3*   CHLORIDE 110   CO2 9*   BUN 18   CREATININE 0.76   CALCIUM 9.0   MAGNESIUM 2.7*   ALBUMIN 4.5       CULTURES:   Results       Procedure Component Value Units Date/Time    Urinalysis [[id number]]  (Abnormal) Collected: 23 1700    Order Status: Completed Specimen: Urine Updated: 23     Color Yellow     Character Clear     Specific Gravity 1.011     Ph 6.5     Glucose Negative mg/dL      Ketones Negative mg/dL      Protein 30 mg/dL      Bilirubin Negative     Urobilinogen, Urine 0.2     Nitrite Negative     Leukocyte Esterase Negative     Occult Blood Negative     Micro Urine Req Microscopic    Narrative:       Protective  Release to patient->Immediate  Protective  Indication for culture:->Evaluation for sepsis without a  clear source of infection    Urine Culture (New) [[id number]] Collected: 09/20/23 1700    Order Status: Sent Specimen: Urine Updated: 09/20/23 1707    Narrative:      Protective  Release to patient->Immediate  Protective  Indication for culture:->Evaluation for sepsis without a  clear source of infection    Blood Culture - Draw one set from central line if present [[id number]] Collected: 09/20/23 1503    Order Status: Sent Specimen: Blood from Line Updated: 09/20/23 1538    Narrative:      Protective  Blood Cultures X2. Draw one blood culture from central line  (including implanted port) and one blood culture  peripherally. If no central line present draw blood cultures  times two peripherally from different sites  Release to patient->Immediate    Blood Culture - Draw one set from central line if present [[id number]] Collected: 09/20/23 1421    Order Status: Sent Specimen: Blood from Peripheral Updated: 09/20/23 1443    Narrative:      Protective  Blood Cultures X2. Draw one blood culture from central line  (including implanted port) and one blood culture  peripherally. If no central line present draw blood cultures  times two peripherally from different sites  Release to patient->Immediate    Urine Culture [[id number]]     Order Status: Sent Specimen: Urine, Clean Catch     Urinalysis [[id number]]     Order Status: Canceled Specimen: Urine, Clean Catch             IMAGING:  CT-ABDOMEN-PELVIS WITH   Final Result      1.  Gastrojejunostomy tube is well-positioned, tip in the proximal jejunum. No fluid collections along the G-tube tract.   2.  No acute inflammatory process in the abdomen or pelvis.   3.  Nonobstructing left nephrolithiasis.   4.  Fluid distention of the right iliopsoas bursa at the level of the right hip joint. Correlate for iliopsoas bursitis.      DX-CHEST-PORTABLE (1 VIEW)   Final Result       1.  Suggestion of obstructive pulmonary disease/emphysema. No acute infiltrates.           CONSULTS:   None    ASSESSMENT/PLAN: 53 y.o. female with history of RA, perforation of posterior duodenum (2019), GJ-tube (placed in June) and severe protein calorie malnutrition admitted for abdominal wall cellulitis at GJ tube site, cultures growing Pseudomonas.    * Cellulitis of abdominal wall- (present on admission)  Assessment & Plan  Subacute, confined to GJ tube site with no evidence of abscess on CT. Wound cultures growing Pseudomonas sensitive to cefepime.  GJ tube function remains intact.    Plan:  -Start IV cefepime 2 times every 12 hours   -Patient high risk for leaving AMA due to need for returning home to take care of her children.  Day team pursue placement of PICC line as soon as possible if deemed appropriate.  -Trend WBC  -Repeat Lactic acid   -Start LR at 1.5x maintenance rate  -Low threshold for repeat CT abdomen  -Day team consider surgical consult for replacement of GJ tube as it is a potential nidus for infection    Macrocytosis without anemia  Assessment & Plan  .2, likely secondary to malnutrition in setting of chronic protein calorie malnutrition.    Plan:  -Start multivitamin  -Start thiamine supplements 100 mg daily      Hypokalemia  Assessment & Plan  Mild, K 3.3, asymptomatic likely secondary to poor p.o. intake in setting of chronic protein calorie malnutrition.    Plan:  -Kdur 20 mEq  -Continue to monitor, replace as needed    Severe protein-calorie malnutrition (HCC)- (present on admission)  Assessment & Plan  Chronic, BMI 15.23, in setting of GJ tube use for partially meeting caloric needs.  Patient reportedly only uses GJ tube for boost supplements, otherwise gets most of her nutrition via p.o.  Patient is at high risk for refeeding syndrome.    Plan:  -Monitor phosphorus and magnesium levels daily  -Nutrition consult    Hyponatremia- (present on admission)  Assessment & Plan  Mild,  sodium 133, asymptomatic, likely secondary to poor p.o. intake in setting of chronic protein calorie malnutrition, feeds partially dependent on GJ tube.    Plan:  -Continue to monitor  -Repeat a.m. labs  -Consider starting gentle NS IV fluids          Core Measures:  Fluids: LR at 119 ml/hr  Lines: PIV  Abx: Cefepime   Diet: Soft, partial feeds by GJ tube  PPX: SCDs/TEDs, Lovenox    CODE STATUS: DNR/DNI      I anticipate the patient:  (1) will require at least two midnights for appropriate medical management, necessitating inpatient admission because patient needs IV antibiotics.      Stormy Gotti MD  [facility]  PGY-2

## 2023-09-21 NOTE — ED NOTES
Bedside report from Nayeli PERDOMO. Pt connected to cardiac monitor, pulse ox, and automatic BP. No supplemental O2 use at this time. Bed in lowest position and locked. Call light within reach

## 2023-09-21 NOTE — DIETARY
"Nutrition services: Day 1 of admit.  Mary Martinez is a 53 y.o. female with admitting DX of Cellulitis of abdominal wall.    Consult received for \"BMI 15.23, PEG tube, patient reportedly only uses for boost.\"    Pt is currently in ED, so RD will not be making formal bedside visit @ this time.     Pt familiar to this RD from prior admissions, most recently on 8/25. Per RD consult @ that time, pt uses PEG-tube for 4 Boost Plus supplements per day. Pt takes PO diet as well.     Will place necessary TF orders.     RD will follow if admitted to inpatient unit.       "

## 2023-09-21 NOTE — ASSESSMENT & PLAN NOTE
On admission labs did not initially reveal anemia, likely 2/2 dehydration. After fluid resuscitation pt noted to have macrocytic anemia. Likely 2/2 nutrient deficiencies. At this time no evidence of bleeding. Believe 4 point HgB drop was hemodilution.    Plan:  - CTM closely HgB levels and for evidence of bleeding given 4 point drop after fluid administration  - Repeat AM H and H pending  - Start multi-vitamin and thiamine supplementation  - Consult dietary as above

## 2023-09-21 NOTE — ED NOTES
Pt resting, even chest rise and fall noted. Bed in lowest position and locked. Call light within reach

## 2023-09-21 NOTE — WOUND TEAM
[facility]  Inpatient Services  Wound and Skin Care Brief Evaluation    Admission Date: 9/20/2023     Last order of IP CONSULT TO WOUND CARE was found on 9/20/2023 from Hospital Encounter on 9/20/2023     HPI, PMH, SH: Reviewed    Chief Complaint   Patient presents with    Feeding Tube Problem     Patient to triage via wc has infected g tube, has followed up with GI and ED was prescribed antibiotics, pathologist called to notify her the antibiotics are not working and needs IV antibiotics.      Diagnosis: Cellulitis of abdominal wall [L03.311]    Unit where seen by Wound Team: KIERA 55/55 YEL     Wound consult placed regarding skin surrounding PEG tube. Chart and images reviewed. This clinician in to assess patient. Skin surrounding PEG tube red and painful with scant drainage.  Hydrofera blue applied to absorb drainage and further protect skin.      No pressure injuries or advanced wound care needs identified. Wound consult completed. No further follow up unless indicated and consulted.          PREVENTATIVE INTERVENTIONS:    Q shift Zan - performed per nursing policy  Q shift pressure point assessments - performed per nursing policy

## 2023-09-21 NOTE — ED NOTES
Pt given ice water and coke. Pt denies any further needs at this time. Pt respirations even and unlabored. Call light within reach

## 2023-09-21 NOTE — ED NOTES
Assist RN: Patient informed me that she wants to leave AMA if the peg tube is unable to be changed today. Informed RTOC, will not transfer to S6. Angel jimenez.

## 2023-09-21 NOTE — ED NOTES
Called into room by patient stating that she is nauseous. Actively vomiting upon entry to room. Requesting nausea medication. Hospitalist Dr. Neri notified. Resident Dr. Gotti notified.

## 2023-09-21 NOTE — ASSESSMENT & PLAN NOTE
Likely from bone given patient's risk factors for osteoporosis. Chronic. Mild. Will defer work-up to outpatient.

## 2023-09-21 NOTE — ASSESSMENT & PLAN NOTE
As per chart review on Enbrel, Not noted on home med rec, will discuss with patient. Taking percocet for pain at home.

## 2023-09-21 NOTE — ED NOTES
Med Rec COMPLETE per PATIENT  Allergies Reviewed-NO CHANGES  Antibiotcs in past 30 days:YES  Anticoagulant in past 14 days:NO  [facility]

## 2023-09-21 NOTE — ED NOTES
Bedside report to Dorina PERDOMO. Pt connected to pulse ox and automatic BP. No supplemental O2 use at this time. Pt BP systolic 70s, pt denies any dizziness/lightheadedness at this time. Bed in lowest position, locked. Call light within reach

## 2023-09-21 NOTE — ED NOTES
Pt BP 87/53, pt denies any dizziness/ lightheadedness. Pt reports hx of low BP, per pt usually systolic 80-90s. Continuous IVF running. Admitting resident made aware, no new orders at this time.

## 2023-09-21 NOTE — CONSULTS
GASTROENTEROLOGY CONSULTATION    PATIENT NAME: Mary Martinez  : 1970  CSN: [id number]  MRN:  [id number]     CONSULTATION DATE:  2023    PRIMARY CARE PROVIDER:  Fidencio Christopher D.O.      REASON FOR CONSULT:  Infected PEG tube    HISTORY OF PRESENT ILLNESS:  Mary Martinez is a 53 y.o with a significant past medical history of RA, perforation of posterior duodenum (), GJ-tube (placed in ) and severe protein calorie malnutrition, who presented with approximately 2-week history of abdominal pain surrounding the GJ-tube site secondary to cellulitis for which she was started on antibiotics (p.o. Cipro) on .  Since starting the antibiotics, she reports little to no improvement and was informed today of wound cultures growing Pseudomonas which will require IV antibiotics and was informed to report to the emergency room.  Patient reports associated fevers, chills, nausea and ongoing abdominal pain.      She has never used the PEG she has been eating since it was placed as the first night she had it, it clogged with Boost. She has gained 13 pounds eating herself. She saw Dr. Rosenberg as an outpatient and he was going to remove tube after treating her infection. She had a ct scan in the hospital and there is no abscess.     PAST MEDICAL HISTORY:  Past Medical History:   Diagnosis Date    Acute renal failure (ARF) (HCC)     Acute renal failure (HCC) 10/6/2011    Allergy     Anemia     Anxiety     Arthritis     Rheumatoid -follows with rheumatologist. Has several fractures due to RA.    ASTHMA     inhalers prn    Blood transfusion without reported diagnosis     Bowel habit changes     20-constipation and diarrhea.    Bronchitis last approx     Chronic pain 2020    Due to RA    Dental disorder     no teeth upper-does not wear her denture. Broken teeth on bottom.    Depression     GERD (gastroesophageal reflux disease)     GIB (gastrointestinal bleeding)     Head ache     Heart burn      taking famotidine    Hiatal hernia     Hiatus hernia syndrome     History of pancreatitis     Hypokalemia     Hyponatremia     Idiopathic chronic pancreatitis (HCC)     Indigestion     taking famotidine    Intractable nausea and vomiting     Kidney disease     Leukocytosis 10/8/2011    Pain     CPS-everywhere    Pneumonia 10/2019    PONV (postoperative nausea and vomiting)     Psychiatric problem     anxiety and depression    Rheumatoid aortitis     Rheumatoid arthritis(714.0) 2003    Severe protein-calorie malnutrition (HCC)     Small bowel obstruction (HCC) 10/6/2011    SMAS (superior mesenteric artery syndrome) (HCC)     Substance abuse (HCC)     Vitamin B12 deficiency neuropathy (HCC)        PAST SURGICAL HISTORY:  Past Surgical History:   Procedure Laterality Date    WA UPPER GI ENDOSCOPY,DIAGNOSIS N/A 8/16/2023    Procedure: GASTROSCOPY;  Surgeon: Blossom Peres M.D.;  Location: SURGERY SAME DAY [facility];  Service: Gastroenterology    WA PLACE PERCUT GASTROSTOMY TUBE N/A 6/12/2023    Procedure: INSERTION, PEG TUBE - PEG AND J TUBE PLACEMENT;  Surgeon: Marilyn Anderson M.D.;  Location: SURGERY SAME DAY [facility];  Service: Gastroenterology    WA UPPER GI ENDOSCOPY,DIAGNOSIS  6/12/2023    Procedure: GASTROSCOPY;  Surgeon: Marilyn Andesron M.D.;  Location: SURGERY SAME DAY [facility];  Service: Gastroenterology    WA UPPER GI ENDOSCOPY,DIAGNOSIS N/A 9/9/2022    Procedure: ENDOSCOPIC ULTRASOUND- UPPER;  Surgeon: Jorge Brooke M.D.;  Location: [facility];  Service: EUS    EGD W/ENDOSCOPIC ULTRASOUND N/A 9/9/2022    Procedure: EGD, WITH ENDOSCOPIC US;  Surgeon: Jorge Brooke M.D.;  Location: [facility];  Service: EUS    WA ENDOSCOPIC US EXAM, ESOPH  4/29/2020    Procedure: EGD, WITH ENDOSCOPIC US;  Surgeon: Jorge Brooke M.D.;  Location: [facility];  Service: Gastroenterology    GASTROSCOPY-ENDO  4/29/2020    Procedure: GASTROSCOPY;  Surgeon:  Jorge Brooke M.D.;  Location: [facility];  Service: Gastroenterology    EGD WITH ASP/BX  4/29/2020    Procedure: EGD, WITH ASPIRATION BIOPSY - POSS FNA;  Surgeon: Jorge Brooke M.D.;  Location: [facility];  Service: Gastroenterology    IA EXPLORATORY OF ABDOMEN N/A 10/4/2019    Procedure: LAPAROTOMY, EXPLORATORY AND REPAIR OF DUODENAL PERFORATION;  Surgeon: James Dumont M.D.;  Location: [facility];  Service: General    COLONOSCOPY  2018    with upper endoscopy    FINGER ARTHROPLASTY Right 6/5/2017    Procedure: FINGER ARTHROPLASTY - LONG, RING AND SMALL VOLAR PLATE;  Surgeon: Lobo Rosen M.D.;  Location: SURGERY SAME DAY [facility];  Service:     FINGER AMPUTATION  6/5/2017    Procedure: FINGER AMPUTATION - LONG, RING AND SMALL AT THE PROXIMAL INTERPHALANGEAL JOINT;  Surgeon: Lobo Rosen M.D.;  Location: SURGERY SAME DAY [facility];  Service:     FINGER ARTHROPLASTY Right 4/17/2017    Procedure: FINGER ARTHROPLASTY ;  Surgeon: Lobo Rosen M.D.;  Location: SURGERY SAME DAY [facility];  Service:     FINGER AMPUTATION Right 9/14/2016    Procedure: FINGER AMPUTATION INDEX;  Surgeon: Lobo Rosen M.D.;  Location: SURGERY SAME DAY [facility];  Service:     IRRIGATION & DEBRIDEMENT ORTHO Right 9/11/2016    Procedure: IRRIGATION & DEBRIDEMENT ORTHO - right index finger;  Surgeon: Madhu Rosen M.D.;  Location: [facility];  Service:     FUSION, PIP JOINT, TOE Right 8/29/2016    Procedure: RE-DO INDEX FINGER PROXIMAL INTERPHALANGEAL ARTHRODESIS;  Surgeon: Lobo Rosen M.D.;  Location: SURGERY SAME DAY [facility];  Service:     BONE GRAFT Right 8/29/2016    Procedure: BONE GRAFT - DISTAL RADIUS ;  Surgeon: Lobo Rosen M.D.;  Location: SURGERY SAME DAY [facility];  Service:     ARTHRODESIS Right 5/6/2016    Procedure: ARTHRODESIS INDEX FINGER PROXIMAL INTERPHALANGEAL;  Surgeon:  Lobo Rosen M.D.;  Location: SURGERY SAME DAY [facility];  Service:     FOOT RECONSTRUCTION RHEUMATIC Left 7/29/2015    Procedure: FOOT RECONSTRUCTION RHEUMATIC;  Surgeon: Heriberto Alevs M.D.;  Location: SURGERY [facility];  Service:     TOE FUSION Right 5/27/2015    Procedure: TOE FUSION 1ST METATARSALPHALANGEAL JOINT;  Surgeon: Heriberto Alves M.D.;  Location: SURGERY [facility];  Service:     BONE SPUR EXCISION  5/27/2015    Procedure: BONE SPUR EXCISION METATARSAL HEAD 2-3;  Surgeon: Heriberto Alves M.D.;  Location: SURGERY [facility];  Service:     HAMMERTOE CORRECTION  5/27/2015    Procedure: HAMMERTOE CORRECTION AND SOFT TISSUE RE-ALINGMENT 2-3 ;  Surgeon: Heriberto Alves M.D.;  Location: SURGERY [facility];  Service:     DENTAL EXTRACTION(S)  2014    all of upper teeth    ABDOMINAL ABSCESS DRAINAGE  9/27/2011    Performed by VERO WRIGHT at SURGERY [facility]    EMANUEL BY LAPAROSCOPY  9/27/2011    Performed by VERO WRIGHT at SURGERY [facility]    APPENDECTOMY  2011    Found out it had ruptured prior to abcess surgery    REPEAT C SECTION  2008    REPEAT C SECTION  2005    REPEAT C SECTION  1999    PRIMARY C SECTION  1991    TONSILLECTOMY  1982    WRIST EXPLORATION Left 1980's    fractured wrist-no hardware        CURRENT MEDS:  Current Facility-Administered Medications   Medication Dose Route Frequency Provider Last Rate Last Admin    [START ON 9/22/2023] multivitamin tablet 1 Tablet  1 Tablet Oral DAILY Ignacio Johansen M.D.        PARoxetine (Paxil) tablet 60 mg  60 mg Oral Q EVENING Ignacio Johansen M.D.        omeprazole (PriLOSEC) capsule 20 mg  20 mg Oral DAILY Ignacio Johansen M.D.   20 mg at 09/21/23 1019    phosphorus (K-Phos-Neutral) per tablet 250 mg  250 mg Oral Q6HRS Ignacio Johansen M.D.        [START ON 9/22/2023] thiamine (Vitamin B-1) tablet 100 mg  100 mg Oral DAILY Ignacio Johansen M.D.        acetaminophen (Tylenol)  tablet 650 mg  650 mg Oral Q4HRS PRN Ignacio Johansen M.D.        oxyCODONE-acetaminophen (Percocet) 5-325 MG per tablet 1 Tablet  1 Tablet Oral Q8HRS PRN Ignacio Johansen M.D.        LORazepam (Ativan) injection 1 mg  1 mg Intravenous Q3HRS PRN Joaquin Buckner M.D.   1 mg at 09/21/23 1301    enoxaparin (Lovenox) inj 30 mg  30 mg Subcutaneous DAILY AT 1800 Stormy Gotti M.D.   30 mg at 09/20/23 2025    nicotine (Nicoderm) 14 MG/24HR 14 mg  14 mg Transdermal Daily-0600 Stormy Gotti M.D.   14 mg at 09/21/23 0542    And    nicotine polacrilex (Nicorette) 2 MG piece 2 mg  2 mg Oral Q HOUR PRN Stormy Gotti M.D.        cefepime (Maxipime) 2 g in  mL IVPB  2 g Intravenous Q12HRS Stormy Gotti M.D.   Stopped at 09/21/23 0432    QUEtiapine (SEROquel) tablet 100 mg  100 mg Oral Q EVENING Ignacio Johansen M.D.        prochlorperazine (Compazine) injection 10 mg  10 mg Intravenous Q6HRS PRN Stormy Gotti M.D.        HYDROmorphone (Dilaudid) injection 0.5 mg  0.5 mg Intravenous Q3HRS PRN Stormy Gotti M.D.   0.5 mg at 09/21/23 1358     Current Outpatient Medications   Medication Sig Dispense Refill    ciprofloxacin (CIPRO) 750 MG Tab Take 1 Tablet by mouth 2 times a day for 10 days. 20 Tablet 0    senna-docusate (PERICOLACE OR SENOKOT S) 8.6-50 MG Tab Take 1 Tablet by mouth 2 times a day.      acetaminophen (TYLENOL) 500 MG Tab Take 1,000 mg by mouth one time as needed for Mild Pain. 2 tablets = 1,000 MG      esomeprazole (NEXIUM) 40 MG delayed-release capsule Take 1 Capsule by mouth daily for 30 days. 60 Capsule 0    oxyCODONE-acetaminophen (PERCOCET) 7.5-325 MG per tablet Take 1 Tablet by mouth every 6 hours as needed for Severe Pain for up to 30 days. 120 Tablet 0    PARoxetine (PAXIL) 30 MG Tab Take 60 mg by mouth every evening. 2 tablets = 60 mg.      QUEtiapine (SEROQUEL) 100 MG Tab Take 1 Tablet by mouth every evening. 60 Tablet 3        ALLERGIES:  Allergies   Allergen  Reactions    Penicillins Anaphylaxis and Hives     Tolerates cephalosporins; reports throat swelling with PCN    Aripiprazole Nausea     Spasms, shaking      Nitrous Oxide Vomiting       SOCIAL HISTORY:  Social History     Socioeconomic History    Marital status:      Spouse name: Ousmane    Number of children: 5    Years of education: Not on file    Highest education level: Not on file   Occupational History    Not on file   Tobacco Use    Smoking status: Every Day     Current packs/day: 0.50     Average packs/day: 0.5 packs/day for 30.0 years (15.0 ttl pk-yrs)     Types: Cigarettes    Smokeless tobacco: Never   Vaping Use    Vaping Use: Never used   Substance and Sexual Activity    Alcohol use: Never    Drug use: Never    Sexual activity: Not Currently   Other Topics Concern     Service No    Blood Transfusions Yes    Caffeine Concern No    Occupational Exposure No    Hobby Hazards No    Sleep Concern No    Stress Concern Yes    Weight Concern No    Special Diet No    Back Care No    Exercise Yes    Bike Helmet Yes    Seat Belt Yes    Self-Exams Yes   Social History Narrative    ** Merged History Encounter **          Social Determinants of Health     Financial Resource Strain: Low Risk  (8/29/2023)    Overall Financial Resource Strain (CARDIA)     Difficulty of Paying Living Expenses: Not hard at all   Food Insecurity: No Food Insecurity (8/29/2023)    Hunger Vital Sign     Worried About Running Out of Food in the Last Year: Never true     Ran Out of Food in the Last Year: Never true   Transportation Needs: No Transportation Needs (8/29/2023)    PRAPARE - Transportation     Lack of Transportation (Medical): No     Lack of Transportation (Non-Medical): No   Physical Activity: Not on file   Stress: Not on file   Social Connections: Not on file   Intimate Partner Violence: Not on file   Housing Stability: Low Risk  (8/29/2023)    Housing Stability Vital Sign     Unable to Pay for Housing in the Last  "Year: No     Number of Places Lived in the Last Year: 1     Unstable Housing in the Last Year: No       FAMILY HISTORY:  Family History   Problem Relation Age of Onset    Cancer Mother     Heart Disease Mother     Hypertension Mother     Heart Disease Father     Hypertension Father         REVIEW OF SYSTEMS:  General ROS: Negative for - chills, fever, night sweats or weight loss.  HEENT ROS: Negative  Respiratory ROS: Negative for - cough or shortness of breath.  Cardiovascular ROS:  Negative for - chest pain or palpitations.  Gastrointestinal ROS: As per the history of present illness.  Genito-Urinary ROS: Negative  Musculoskeletal ROS: Negative.  Neurological ROS: Negative  Skin ROS: negative  Hematology ROS: negative  Endocrinology ROS: Negative        PHYSICAL EXAM:  VITALS: BP 93/63   Pulse 90   Temp 36.7 °C (98 °F) (Temporal)   Resp 19   Ht 1.6 m (5' 3\")   Wt 39 kg (86 lb)   LMP 09/21/2011   SpO2 97%   BMI 15.23 kg/m²   GEN:  Mary Martinez is a 53 y.o. female in no acute distress.very thin, temporal wasting  HEENT: Mucous membranes pink and moist.  Sclera anicteric.    NECK:    Neck supple without lymphadenopathy or thyromegaly.  LUNGS: Clear to auscultation posteriorly.  HEART: Regular rate and rhythm. S1 and S2 normal. No murmurs, gallops  ABD:  PEG in place with erythema around the PEG insert, there is pus present  RECTAL: Not done at this time.  EXT:  Without cyanosis, deformity or pitting edema.  SKIN:  Pink, warm, dry.  NEURO: Grossly intact, A/OR.    LABS:  Recent Labs     09/20/23  1421 09/21/23  0105   WBC 12.0* 11.0*   .2* 99.0*     Recent Labs     09/20/23  1421 09/21/23  0105   GLUCOSE 99 130*   BUN 18 15   CO2 9* 11*     Lab Results   Component Value Date    INR 1.04 09/20/2023    INR 1.17 (H) 06/12/2023    INR 1.09 08/27/2022     No components found for: \"ALT\", \"AST\", \"GGT\", \"ALKPHOS\"  No results found for: \"BILINEO\"      @LASTGCAT(ZV5043)@     @LASTGCAT(AC4214)@ "       IMPRESSION/PLAN:  Infected PEG tube  Pseudomonas infection from PEG  Malnutrition patient has never used PEG and she hs gained 13 pounds eating herself    Pull Peg tube  Cover with gauze   Ciprofloxacin 750 mg po bid for a week       Jenifer Quintana M.D.  Gastroenterology    In the ER patient was placed in bed and the PEG was pulled and covered. She is not experiencing any pain and very little drainage occurred.

## 2023-09-23 LAB
BACTERIA UR CULT: NORMAL
SIGNIFICANT IND 70042: NORMAL
SITE SITE: NORMAL
SOURCE SOURCE: NORMAL

## 2023-09-29 ENCOUNTER — APPOINTMENT (OUTPATIENT)
Dept: MEDICAL GROUP | Facility: CLINIC | Age: 53
End: 2023-09-29
Attending: PHYSICAL MEDICINE & REHABILITATION
Payer: MEDICAID

## 2023-10-03 ENCOUNTER — OFFICE VISIT (OUTPATIENT)
Dept: MEDICAL GROUP | Facility: CLINIC | Age: 53
End: 2023-10-03
Payer: MEDICAID

## 2023-10-03 VITALS
BODY MASS INDEX: 15.68 KG/M2 | HEIGHT: 63 IN | DIASTOLIC BLOOD PRESSURE: 62 MMHG | TEMPERATURE: 98.3 F | OXYGEN SATURATION: 91 % | RESPIRATION RATE: 20 BRPM | HEART RATE: 90 BPM | WEIGHT: 88.5 LBS | SYSTOLIC BLOOD PRESSURE: 97 MMHG

## 2023-10-03 DIAGNOSIS — E87.1 HYPONATREMIA: ICD-10-CM

## 2023-10-03 DIAGNOSIS — G89.22 CHRONIC POST-THORACOTOMY PAIN: ICD-10-CM

## 2023-10-03 DIAGNOSIS — D53.9 MACROCYTIC ANEMIA: ICD-10-CM

## 2023-10-03 DIAGNOSIS — R62.7 FAILURE TO THRIVE IN ADULT: ICD-10-CM

## 2023-10-03 DIAGNOSIS — M05.79 RHEUMATOID ARTHRITIS INVOLVING MULTIPLE SITES WITH POSITIVE RHEUMATOID FACTOR (HCC): ICD-10-CM

## 2023-10-03 DIAGNOSIS — E87.6 HYPOKALEMIA: ICD-10-CM

## 2023-10-03 DIAGNOSIS — E43 SEVERE PROTEIN-CALORIE MALNUTRITION (HCC): ICD-10-CM

## 2023-10-03 DIAGNOSIS — R63.4 UNINTENTIONAL WEIGHT LOSS: ICD-10-CM

## 2023-10-03 DIAGNOSIS — E83.39 HYPOPHOSPHATEMIA: ICD-10-CM

## 2023-10-03 DIAGNOSIS — R10.13 EPIGASTRIC ABDOMINAL PAIN: ICD-10-CM

## 2023-10-03 DIAGNOSIS — L03.311 CELLULITIS OF ABDOMINAL WALL: ICD-10-CM

## 2023-10-03 DIAGNOSIS — K21.9 GASTROESOPHAGEAL REFLUX DISEASE, UNSPECIFIED WHETHER ESOPHAGITIS PRESENT: ICD-10-CM

## 2023-10-03 DIAGNOSIS — K44.9 HIATAL HERNIA: ICD-10-CM

## 2023-10-03 DIAGNOSIS — F31.9 BIPOLAR 1 DISORDER (HCC): ICD-10-CM

## 2023-10-03 DIAGNOSIS — E43 SEVERE MALNUTRITION (HCC): ICD-10-CM

## 2023-10-03 PROCEDURE — 3078F DIAST BP <80 MM HG: CPT | Mod: GC

## 2023-10-03 PROCEDURE — 3074F SYST BP LT 130 MM HG: CPT | Mod: GC

## 2023-10-03 PROCEDURE — 99214 OFFICE O/P EST MOD 30 MIN: CPT | Mod: GC

## 2023-10-03 RX ORDER — PANTOPRAZOLE SODIUM 40 MG/1
TABLET, DELAYED RELEASE ORAL
COMMUNITY
Start: 2023-09-11 | End: 2024-02-07

## 2023-10-03 RX ORDER — OXYCODONE AND ACETAMINOPHEN 10; 325 MG/1; MG/1
TABLET ORAL
COMMUNITY
Start: 2023-09-24 | End: 2023-10-06 | Stop reason: SDUPTHER

## 2023-10-03 RX ORDER — PREDNISONE 10 MG/1
TABLET ORAL
Qty: 135 TABLET | Refills: 0 | Status: SHIPPED | OUTPATIENT
Start: 2023-10-03 | End: 2024-01-16

## 2023-10-03 ASSESSMENT — FIBROSIS 4 INDEX: FIB4 SCORE: 0.4

## 2023-10-03 NOTE — ASSESSMENT & PLAN NOTE
Patient has had electrolyte disturbances, possibly related to malnutrition.  We will repeat electrolytes outpatient including CMP, magnesium, phosphorus, calcium

## 2023-10-03 NOTE — ASSESSMENT & PLAN NOTE
Chronic pain, previously seen by Dr. Kerr pain medicine/[facility].  On Percocet 10, 5 times per day.  -Consent for opiate treatment signed  -Pain management agreement signed  -Percocet 10 prescribed  -Pt has narcan at home

## 2023-10-03 NOTE — ASSESSMENT & PLAN NOTE
Patient was on advanced RA medication TNF/biologic, however when she had a gap insurance she was unable to continue this medication.  She is now complaining of RA flare resulting in severe pain and difficulty walking/daily tasks.  We discussed risks and benefits of course of steroids to hold her over until she can get in to see her rheumatologist, she would like to proceed with steroids.  -Prednisone, will do 5 days of 40 then taper down to 10. Plan to continue prednisone 10 until she can get in to see rheum.   -Patient is at high risk for infection due to recent pseudomonal infection, provided ER precautions and we are proceeding at lower maintenance dose of prednisone due to this.  -Follow-up with rheumatology  -Continue Percocet for chronic RA pain.

## 2023-10-03 NOTE — ASSESSMENT & PLAN NOTE
She had potassium at 3.1 in the hospital.  She has not had repeat labs since then.  -Repeat electrolytes  -Follow-up in 3 weeks

## 2023-10-03 NOTE — PROGRESS NOTES
This note is formatted in an APSO format, for additional subjective and objective evaluation please scroll to the bottom of the note.    CC:  Chief Complaint   Patient presents with    Establish Care     History obtained from patient and her son.    Two years ago, started losing weight. Started at 120-130, lost weight intermittently. Starting about a year ago, unable to gain weight back. Became weak, couldn't walk or stand up. June 12 2023 they placed feeding Dr. Anderson [facility] . Almost immediately she was able to eat PO. [facility] took over after.     Sees Dr. Delatorre Rheum.  [facility] but would like to establish primary care here and was told that she was not able to see psychiatry at [facility] anymore.    Last SI was 6-12 months ago.     Assessment/Plan:  Problem List Items Addressed This Visit       Hyponatremia    Relevant Orders    CBC WITH DIFFERENTIAL    Comp Metabolic Panel    MAGNESIUM    PHOSPHORUS    URINALYSIS    PTH WITH CALCIUM    Macrocytic anemia    Relevant Orders    CBC WITH DIFFERENTIAL    Comp Metabolic Panel    MAGNESIUM    PHOSPHORUS    URINALYSIS    PTH WITH CALCIUM    Rheumatoid arthritis (HCC)     Patient was on advanced RA medication TNF/biologic, however when she had a gap insurance she was unable to continue this medication.  She is now complaining of RA flare resulting in severe pain and difficulty walking/daily tasks.  We discussed risks and benefits of course of steroids to hold her over until she can get in to see her rheumatologist, she would like to proceed with steroids.  -Prednisone, will do 5 days of 40 then taper down to 10. Plan to continue prednisone 10 until she can get in to see rheum.   -Patient is at high risk for infection due to recent pseudomonal infection, provided ER precautions and we are proceeding at lower maintenance dose of prednisone due to this.  -Follow-up with rheumatology  -Continue Percocet for chronic RA pain.          Relevant Medications    oxyCODONE-acetaminophen (PERCOCET-10)  MG Tab    predniSONE (DELTASONE) 10 MG Tab    Other Relevant Orders    CBC WITH DIFFERENTIAL    Comp Metabolic Panel    MAGNESIUM    PHOSPHORUS    URINALYSIS    PTH WITH CALCIUM    Controlled Substance Treatment Agreement    Hypophosphatemia    Relevant Orders    CBC WITH DIFFERENTIAL    Comp Metabolic Panel    MAGNESIUM    PHOSPHORUS    URINALYSIS    PTH WITH CALCIUM    Chronic pain     Chronic pain, previously seen by Dr. Kerr pain medicine/PM&[facility].  On Percocet 10, 5 times per day.  -Consent for opiate treatment signed  -Pain management agreement signed  -Percocet 10 prescribed  -Pt has narcan at home         Relevant Medications    oxyCODONE-acetaminophen (PERCOCET-10)  MG Tab    predniSONE (DELTASONE) 10 MG Tab    Hiatal hernia    Relevant Orders    Referral to Gastroenterology    Unintentional weight loss     Patient reports unintentional weight loss for the last 2 years, worsening over the last year, but this has been improving over the last month or so.  She is been able to take more food by mouth.  We do not have a clear etiology despite multiple specialist referrals and many tests.  She does smoke cigarettes and is unwilling to quit at this time.  -Referral to GI  -Smoking cessation counseling  -F/u in 3 weeks, we should continue to investigate for underlying etiology           Relevant Orders    Referral to Gastroenterology    CBC WITH DIFFERENTIAL    Comp Metabolic Panel    MAGNESIUM    PHOSPHORUS    URINALYSIS    PTH WITH CALCIUM    Epigastric abdominal pain    Severe protein-calorie malnutrition (HCC)     Patient has had electrolyte disturbances, possibly related to malnutrition.  We will repeat electrolytes outpatient including CMP, magnesium, phosphorus, calcium         Hypokalemia     She had potassium at 3.1 in the hospital.  She has not had repeat labs since then.  -Repeat electrolytes  -Follow-up in 3 weeks          Relevant Orders    CBC WITH DIFFERENTIAL    Comp Metabolic Panel    MAGNESIUM    PHOSPHORUS    URINALYSIS    PTH WITH CALCIUM    Severe protein-calorie malnutrition (HCC)    Relevant Orders    Referral to Gastroenterology    CBC WITH DIFFERENTIAL    Comp Metabolic Panel    MAGNESIUM    PHOSPHORUS    URINALYSIS    PTH WITH CALCIUM    Bipolar 1 disorder (HCC)     Chronic bipolar 1.  No recent SI.  No hallucinations.  Previously treated by psychiatry at [facility], but now that she is not going to [facility] she will need to establish with new psychiatrist.  -Refilled quetiapine and Paxil, patient is stable on these medications  -Referral to psychiatry  -RTC in 3 weeks         Relevant Orders    Referral to Psychiatry    Failure to thrive in adult    Relevant Orders    CBC WITH DIFFERENTIAL    Comp Metabolic Panel    MAGNESIUM    PHOSPHORUS    URINALYSIS    PTH WITH CALCIUM    GERD (gastroesophageal reflux disease)    Relevant Medications    pantoprazole (PROTONIX) 40 MG Tablet Delayed Response    Other Relevant Orders    Referral to Gastroenterology    Cellulitis of abdominal wall    Relevant Orders    CBC WITH DIFFERENTIAL    Comp Metabolic Panel    MAGNESIUM    PHOSPHORUS    URINALYSIS    PTH WITH CALCIUM      Orders Placed This Encounter    CBC WITH DIFFERENTIAL    Comp Metabolic Panel    MAGNESIUM    PHOSPHORUS    URINALYSIS    PTH WITH CALCIUM    Referral to Gastroenterology    Referral to Psychiatry    oxyCODONE-acetaminophen (PERCOCET-10)  MG Tab    pantoprazole (PROTONIX) 40 MG Tablet Delayed Response    predniSONE (DELTASONE) 10 MG Tab    Consent for Opiate Prescription    Controlled Substance Treatment Agreement       Return in about 3 weeks (around 10/24/2023).      LABS: Results reviewed and discussed with the patient, questions answered.    HISTORY OF PRESENT ILLNESS: Patient is a 53 y.o. 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  Patient with history of rheumatoid arthritis, previously treated with TNF/Biologics.  At one point in the past she was on prednisone 40 for 1 year.  Sees Dr. Delatorre Rheum.           1. Epigastric abdominal pain        2. Unintentional weight loss  Referral to Gastroenterology    CBC WITH DIFFERENTIAL    Comp Metabolic Panel    MAGNESIUM    PHOSPHORUS    URINALYSIS    PTH WITH CALCIUM      3. Hiatal hernia  Referral to Gastroenterology      4. Severe protein-calorie malnutrition (HCC)  Referral to Gastroenterology    CBC WITH DIFFERENTIAL    Comp Metabolic Panel    MAGNESIUM    PHOSPHORUS    URINALYSIS    PTH WITH CALCIUM      5. Gastroesophageal reflux disease, unspecified whether esophagitis present  Referral to Gastroenterology      6. Bipolar 1 disorder (HCC)  Referral to Psychiatry      7. Failure to thrive in adult  CBC WITH DIFFERENTIAL    Comp Metabolic Panel    MAGNESIUM    PHOSPHORUS    URINALYSIS    PTH WITH CALCIUM      8. Rheumatoid arthritis involving multiple sites with positive rheumatoid factor (HCC)  CBC WITH DIFFERENTIAL    Comp Metabolic Panel    MAGNESIUM    PHOSPHORUS    URINALYSIS    PTH WITH CALCIUM    Controlled Substance Treatment Agreement      9. Hypokalemia  CBC WITH DIFFERENTIAL    Comp Metabolic Panel    MAGNESIUM    PHOSPHORUS    URINALYSIS    PTH WITH CALCIUM      10. Cellulitis of abdominal wall  CBC WITH DIFFERENTIAL    Comp Metabolic Panel    MAGNESIUM    PHOSPHORUS    URINALYSIS    PTH WITH CALCIUM      11. Hypophosphatemia  CBC WITH DIFFERENTIAL    Comp Metabolic Panel    MAGNESIUM    PHOSPHORUS    URINALYSIS    PTH WITH CALCIUM      12. Macrocytic anemia  CBC WITH DIFFERENTIAL    Comp Metabolic Panel    MAGNESIUM    PHOSPHORUS    URINALYSIS    PTH WITH CALCIUM      13.  Hyponatremia  CBC WITH DIFFERENTIAL    Comp Metabolic Panel    MAGNESIUM    PHOSPHORUS    URINALYSIS    PTH WITH CALCIUM      14. 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            Patient Active Problem List    Diagnosis Date Noted    Cellulitis of abdominal wall 09/20/2023    GERD (gastroesophageal reflux disease) 08/24/2023    Common bile duct dilation 08/14/2023    Hypocalcemia 05/25/2023    Failure to thrive in adult 05/25/2023    Thrush 05/25/2023    UTI (urinary tract infection) 05/25/2023    Thrombocytosis 03/22/2023    Normocytic anemia 02/19/2023    Right middle lobe pulmonary nodule 02/19/2023    Neuropathy 02/18/2023    Starvation ketoacidosis 01/19/2023    Diarrhea 01/19/2023    Bipolar 1 disorder ([facility]) 01/19/2023    Severe protein-calorie malnutrition ([facility]) 08/31/2022    Vitamin B12 deficiency neuropathy ([facility]) 08/29/2022    Idiopathic chronic pancreatitis ([facility]) 08/29/2022    Rheumatoid arthritis of hand ([facility]) 08/29/2022    Chronic pain syndrome 08/29/2022    SMAS (superior mesenteric artery syndrome) c/b feeding issues ([facility]) 08/27/2022    Severe protein-calorie malnutrition ([facility]) 08/27/2022    Hypokalemia 08/27/2022    Tobacco abuse 08/27/2022    Renal calculus 02/21/2022    DNR (do not resuscitate) 02/21/2022    Epigastric abdominal pain 02/21/2022    Dysthymia 05/07/2021    Enlarged lymph nodes 05/07/2021    Hiatal hernia 05/07/2021    Unintentional weight loss 05/07/2021    History of rheumatoid arthritis 02/12/2021    Immunosuppression due to chronic steroid use ([facility]) 02/12/2021    Other hydronephrosis 02/12/2021    Hypophosphatemia 10/08/2019    Chronic pain 10/08/2019    Hoarseness 10/06/2019    History of perforation of posterior duodenum pancreatitis abscess 10/04/2019    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    Rheumatoid arthritis (HCC) 08/29/2016    Arthritis, rheumatoid (HCC) 05/06/2016    Alkaline phosphatase elevation 10/19/2011    Tobacco dependence  "10/19/2011    Macrocytic anemia 10/10/2011    Hyponatremia 10/06/2011      Allergies:Penicillins, Aripiprazole, and Nitrous oxide    Current Outpatient Medications   Medication Sig Dispense Refill    oxyCODONE-acetaminophen (PERCOCET-10)  MG Tab TAKE 1 TABLET BY MOUTH EVERY 4 HOURS AS NEEDED FOR SEVERE PAIN      pantoprazole (PROTONIX) 40 MG Tablet Delayed Response TAKE 1 TABLET BY MOUTH TWICE DAILY 30 MINUTES BEFORE MEALS      predniSONE (DELTASONE) 10 MG Tab Take 4 Tablets by mouth every day for 5 days, THEN 3 Tablets every day for 5 days, THEN 2 Tablets every day for 5 days, THEN 1 Tablet every day for 90 days. 135 Tablet 0    senna-docusate (PERICOLACE OR SENOKOT S) 8.6-50 MG Tab Take 1 Tablet by mouth 2 times a day.      acetaminophen (TYLENOL) 500 MG Tab Take 1,000 mg by mouth one time as needed for Mild Pain. 2 tablets = 1,000 MG      PARoxetine (PAXIL) 30 MG Tab Take 60 mg by mouth every evening. 2 tablets = 60 mg.      QUEtiapine (SEROQUEL) 100 MG Tab Take 1 Tablet by mouth every evening. 60 Tablet 3     No current facility-administered medications for this visit.       Social History     Tobacco Use    Smoking status: Every Day     Current packs/day: 0.50     Average packs/day: 0.5 packs/day for 30.0 years (15.0 ttl pk-yrs)     Types: Cigarettes     Passive exposure: Never    Smokeless tobacco: Never   Vaping Use    Vaping Use: Never used   Substance Use Topics    Alcohol use: Never    Drug use: Never     Social History     Social History Narrative    ** Merged History Encounter **            Family History   Problem Relation Age of Onset    Cancer Mother     Heart Disease Mother     Hypertension Mother     Heart Disease Father     Hypertension Father        Exam:    BP 97/62 (BP Location: Right arm, Patient Position: Sitting, BP Cuff Size: Small adult)   Pulse 90   Temp 36.8 °C (98.3 °F) (Temporal)   Resp 20   Ht 1.6 m (5' 3\")   Wt 40.1 kg (88 lb 8 oz)   LMP 09/21/2011   SpO2 91%   BMI 15.68 " kg/m²  Body mass index is 15.68 kg/m².    Gen: Alert and oriented, No apparent distress.  Cachectic appearing.  HEENT: NCAT, MMM  Neck: Supple, FROM  Chest: No deformities, Equal chest expansion  Lungs: Normal effort, CTA bilaterally, no wheezes, rhonchi, or rales  CV: Regular rate and rhythm. No murmurs, rubs, or gallops. Pulse palpable  Abd: Non-distended.  TTP in epigastric and suprapubic areas.  Well-healed surgical scar from G-tube, no signs of infection  Ext: No clubbing, cyanosis, edema.  Missing 4 fingers on right hand, no sign of infection  Skin: Warm/dry, without rashes  Neuro: A/O x 4, CN 2-12 Grossly intact, Motor/sensory grossly intact  Psych: Normal behavior, normal affect      Petar Abbott MD  [facility] Resident

## 2023-10-03 NOTE — ASSESSMENT & PLAN NOTE
Patient reports unintentional weight loss for the last 2 years, worsening over the last year, but this has been improving over the last month or so.  She is been able to take more food by mouth.  We do not have a clear etiology despite multiple specialist referrals and many tests.  She does smoke cigarettes and is unwilling to quit at this time.  -Referral to GI  -Smoking cessation counseling  -F/u in 3 weeks, we should continue to investigate for underlying etiology

## 2023-10-03 NOTE — ASSESSMENT & PLAN NOTE
Chronic bipolar 1.  No recent SI.  No hallucinations.  Previously treated by psychiatry at [facility], but now that she is not going to [facility] she will need to establish with new psychiatrist.  -Refilled quetiapine and Paxil, patient is stable on these medications  -Referral to psychiatry  -RTC in 3 weeks

## 2023-10-04 ENCOUNTER — TELEPHONE (OUTPATIENT)
Dept: MEDICAL GROUP | Facility: CLINIC | Age: 53
End: 2023-10-04
Payer: MEDICAID

## 2023-10-04 NOTE — TELEPHONE ENCOUNTER
VOICEMAIL  1. Caller Name: Mary Martinez                       Call Back Number: [phone]     2. Message: RX for Klonopin and Paroxetine are not at the pharmacy.     3. Patient approves office to leave a detailed voicemail/MyChart message: N\A

## 2023-10-06 RX ORDER — OXYCODONE AND ACETAMINOPHEN 10; 325 MG/1; MG/1
1 TABLET ORAL EVERY 4 HOURS PRN
Qty: 120 TABLET | Refills: 0 | Status: SHIPPED | OUTPATIENT
Start: 2023-10-24 | End: 2023-11-14 | Stop reason: SDUPTHER

## 2023-10-06 RX ORDER — NALOXONE HYDROCHLORIDE 4 MG/.1ML
SPRAY NASAL
Qty: 1 EACH | Refills: 3 | Status: SHIPPED | OUTPATIENT
Start: 2023-10-06 | End: 2024-02-07

## 2023-10-06 RX ORDER — QUETIAPINE FUMARATE 100 MG/1
100 TABLET, FILM COATED ORAL EVERY EVENING
Qty: 90 TABLET | Refills: 0 | Status: SHIPPED | OUTPATIENT
Start: 2023-10-06 | End: 2024-01-06

## 2023-10-06 RX ORDER — PAROXETINE 30 MG/1
60 TABLET, FILM COATED ORAL EVERY EVENING
Qty: 180 TABLET | Refills: 0 | Status: SHIPPED | OUTPATIENT
Start: 2023-10-06 | End: 2024-01-06

## 2023-10-17 ENCOUNTER — PHARMACY VISIT (OUTPATIENT)
Dept: PHARMACY | Facility: MEDICAL CENTER | Age: 53
End: 2023-10-17
Payer: COMMERCIAL

## 2023-10-26 ENCOUNTER — OFFICE VISIT (OUTPATIENT)
Dept: MEDICAL GROUP | Facility: CLINIC | Age: 53
End: 2023-10-26
Payer: MEDICAID

## 2023-10-26 VITALS
WEIGHT: 85.2 LBS | TEMPERATURE: 96.9 F | OXYGEN SATURATION: 95 % | HEART RATE: 86 BPM | HEIGHT: 62 IN | DIASTOLIC BLOOD PRESSURE: 72 MMHG | SYSTOLIC BLOOD PRESSURE: 103 MMHG | BODY MASS INDEX: 15.68 KG/M2

## 2023-10-26 DIAGNOSIS — R63.4 UNINTENTIONAL WEIGHT LOSS: ICD-10-CM

## 2023-10-26 DIAGNOSIS — Z12.31 BREAST CANCER SCREENING BY MAMMOGRAM: ICD-10-CM

## 2023-10-26 DIAGNOSIS — R91.1 PULMONARY NODULE, RIGHT: ICD-10-CM

## 2023-10-26 PROCEDURE — 3078F DIAST BP <80 MM HG: CPT

## 2023-10-26 PROCEDURE — 3074F SYST BP LT 130 MM HG: CPT

## 2023-10-26 PROCEDURE — 99213 OFFICE O/P EST LOW 20 MIN: CPT | Mod: GC

## 2023-10-26 RX ORDER — ALBUTEROL SULFATE 90 UG/1
2 AEROSOL, METERED RESPIRATORY (INHALATION) EVERY 4 HOURS PRN
Qty: 1 EACH | Refills: 3 | Status: SHIPPED | OUTPATIENT
Start: 2023-10-26 | End: 2024-02-07

## 2023-10-26 RX ORDER — FOLIC ACID 1 MG/1
1 TABLET ORAL DAILY
Qty: 180 TABLET | Refills: 1 | Status: SHIPPED | OUTPATIENT
Start: 2023-10-26 | End: 2024-02-07

## 2023-10-26 ASSESSMENT — FIBROSIS 4 INDEX: FIB4 SCORE: 0.4

## 2023-10-26 NOTE — ASSESSMENT & PLAN NOTE
Differential includes malignancy, hyperthyroidism, depression, diabetes, medication side effects, chronic infections.  > Hemoglobin A1c normal, TSH normal, HIV and hep C negative, patient's depression controlled on Paxil and Seroquel  - CT Chest without contrast  - Mammogram screening  -Patient has gained 10 pounds in the past 1 month; continue 6 meals a day high-protein foods  -Patient to get labs that were ordered during the last appointment

## 2023-10-27 NOTE — PROGRESS NOTES
CC:  Chief Complaint   Patient presents with    Follow-Up       HISTORY OF PRESENT ILLNESS: Patient is a 53 y.o. female established patient who presents today with:    Problem   Pulmonary Nodule, Right    Patient with 40-pack-year history of smoking.  4 mm right middle lobe pulmonary nodule on CT 2/17/2023.  Was 3 mm in 2021.     Unintentional Weight Loss    Patient with 40 pounds of weight loss since January. Started at 120-130, lost weight intermittently. Became weak, couldn't walk or stand up. June 12 2023 they placed feeding tube Dr. [facility]. Almost immediately she was able to eat PO. [facility] took over after.  She has since had feeding tube removed in September 2023 due to infection.  She reports she is now eating about 6 meals a day of protein rich foods she has gained about 10 pounds in the past month.  Patient is up-to-date on all her cancer screenings which have been negative thus far except for she is due for screening mammogram and she has a lung nodule that has been present since 2021.         Allergies:Penicillins, Aripiprazole, and Nitrous oxide    Current Outpatient Medications   Medication Sig Dispense Refill    albuterol 108 (90 Base) MCG/ACT Aero Soln inhalation aerosol Inhale 2 Puffs every four hours as needed for Shortness of Breath. 1 Each 3    folic acid (FOLVITE) 1 MG Tab Take 1 Tablet by mouth every day. 180 Tablet 1    oxyCODONE-acetaminophen (PERCOCET-10)  MG Tab Take 1 Tablet by mouth every four hours as needed for Severe Pain for up to 30 days. Indications: Pain 120 Tablet 0    PARoxetine (PAXIL) 30 MG Tab Take 2 Tablets by mouth every evening. 2 tablets = 60 mg. 180 Tablet 0    QUEtiapine (SEROQUEL) 100 MG Tab Take 1 Tablet by mouth every evening. 90 Tablet 0    Naloxone (NARCAN) 4 MG/0.1ML Liquid One spray in one nostril for overdose and call 911. 1 Each 3    pantoprazole (PROTONIX) 40 MG Tablet Delayed Response TAKE 1 TABLET BY MOUTH TWICE DAILY 30 MINUTES BEFORE MEALS       "predniSONE (DELTASONE) 10 MG Tab Take 4 Tablets by mouth every day for 5 days, THEN 3 Tablets every day for 5 days, THEN 2 Tablets every day for 5 days, THEN 1 Tablet every day for 90 days. 135 Tablet 0    acetaminophen (TYLENOL) 500 MG Tab Take 1,000 mg by mouth one time as needed for Mild Pain. 2 tablets = 1,000 MG      ciprofloxacin (CIPRO) 750 MG Tab Take 1 Tablet by mouth 2 times a day for 10 days. (Patient not taking: Reported on 10/26/2023) 20 Tablet 0    senna-docusate (PERICOLACE OR SENOKOT S) 8.6-50 MG Tab Take 1 Tablet by mouth 2 times a day. (Patient not taking: Reported on 10/26/2023)       No current facility-administered medications for this visit.       Social History     Tobacco Use    Smoking status: Every Day     Current packs/day: 0.50     Average packs/day: 0.5 packs/day for 30.0 years (15.0 ttl pk-yrs)     Types: Cigarettes     Passive exposure: Never    Smokeless tobacco: Never   Vaping Use    Vaping Use: Never used   Substance Use Topics    Alcohol use: Never    Drug use: Never     Social History     Social History Narrative    ** Merged History Encounter **            Family History   Problem Relation Age of Onset    Cancer Mother     Heart Disease Mother     Hypertension Mother     Heart Disease Father     Hypertension Father        Exam:    /72 (BP Location: Right arm, Patient Position: Sitting, BP Cuff Size: Adult)   Pulse 86   Temp 36.1 °C (96.9 °F) (Temporal)   Ht 1.575 m (5' 2\")   Wt 38.6 kg (85 lb 3.2 oz)   LMP 09/21/2011   SpO2 95%   BMI 15.58 kg/m²  Body mass index is 15.58 kg/m².    Gen: Alert and oriented, No apparent distress.  Cachectic  HEENT: NCAT, MMM  Neck: Supple, FROM  Chest: No deformities, Equal chest expansion  Lungs: Normal effort, CTA bilaterally, no wheezes, rhonchi, or rales  CV: Regular rate and rhythm. No murmurs, rubs, or gallops. Pulse palpable  Abd: Non-distended.  Mild tenderness to palpation epigastric area  Ext: No clubbing, cyanosis, " edema.  Skin: Warm/dry, without rashes  Neuro: A/O x 4, Motor/sensory grossly intact  Psych: Normal behavior, normal affect      LABS: Results reviewed and discussed with the patient, questions answered.    Assessment/Plan:  Problem List Items Addressed This Visit       Unintentional weight loss     Differential includes malignancy, hyperthyroidism, depression, diabetes, medication side effects, chronic infections.  > Hemoglobin A1c normal, TSH normal, HIV and hep C negative, patient's depression controlled on Paxil and Seroquel  - CT Chest without contrast  - Mammogram screening  -Patient has gained 10 pounds in the past 1 month; continue 6 meals a day high-protein foods  -Patient to get labs that were ordered during the last appointment           Pulmonary nodule, right     Given patient has a 40-pack-year history of smoking, plan to repeat CT chest to evaluate lung nodule.         Relevant Orders    CT-CHEST (THORAX) W/O     Other Visit Diagnoses       Breast cancer screening by mammogram        Relevant Orders    MA-SCREENING MAMMO BILAT W/TOMOSYNTHESIS W/CAD           Orders Placed This Encounter    MA-SCREENING MAMMO BILAT W/TOMOSYNTHESIS W/CAD    CT-CHEST (THORAX) W/O    albuterol 108 (90 Base) MCG/ACT Aero Soln inhalation aerosol    folic acid (FOLVITE) 1 MG Tab       No follow-ups on file.      [facility] Resident

## 2023-10-27 NOTE — ASSESSMENT & PLAN NOTE
Given patient has a 40-pack-year history of smoking, plan to repeat CT chest to evaluate lung nodule.

## 2023-10-31 ENCOUNTER — HOSPITAL ENCOUNTER (OUTPATIENT)
Dept: RADIOLOGY | Facility: MEDICAL CENTER | Age: 53
End: 2023-10-31
Payer: MEDICAID

## 2023-10-31 DIAGNOSIS — R91.1 PULMONARY NODULE, RIGHT: ICD-10-CM

## 2023-10-31 DIAGNOSIS — Z12.31 BREAST CANCER SCREENING BY MAMMOGRAM: ICD-10-CM

## 2023-10-31 PROCEDURE — 77063 BREAST TOMOSYNTHESIS BI: CPT

## 2023-10-31 PROCEDURE — 71250 CT THORAX DX C-: CPT

## 2023-11-08 ENCOUNTER — TELEPHONE (OUTPATIENT)
Dept: MEDICAL GROUP | Facility: CLINIC | Age: 53
End: 2023-11-08

## 2023-11-08 NOTE — TELEPHONE ENCOUNTER
VOICEMAIL  1. Caller Name: Lobo- son                      Call Back Number: [phone]    2. Message: CT results and RX refill for Percocet.     3. Patient approves office to leave a detailed voicemail/MyChart message: N\A

## 2023-11-13 ENCOUNTER — APPOINTMENT (OUTPATIENT)
Dept: RADIOLOGY | Facility: MEDICAL CENTER | Age: 53
End: 2023-11-13
Attending: EMERGENCY MEDICINE
Payer: MEDICAID

## 2023-11-13 ENCOUNTER — HOSPITAL ENCOUNTER (EMERGENCY)
Facility: MEDICAL CENTER | Age: 53
End: 2023-11-13
Attending: EMERGENCY MEDICINE
Payer: MEDICAID

## 2023-11-13 VITALS
WEIGHT: 94.8 LBS | DIASTOLIC BLOOD PRESSURE: 73 MMHG | TEMPERATURE: 98.2 F | OXYGEN SATURATION: 99 % | SYSTOLIC BLOOD PRESSURE: 119 MMHG | HEIGHT: 63 IN | BODY MASS INDEX: 16.8 KG/M2 | HEART RATE: 67 BPM | RESPIRATION RATE: 16 BRPM

## 2023-11-13 DIAGNOSIS — S99.922A INJURY OF TOE ON LEFT FOOT, INITIAL ENCOUNTER: ICD-10-CM

## 2023-11-13 PROCEDURE — 96372 THER/PROPH/DIAG INJ SC/IM: CPT | Mod: XU

## 2023-11-13 PROCEDURE — 99284 EMERGENCY DEPT VISIT MOD MDM: CPT

## 2023-11-13 PROCEDURE — 28665 TREAT TOE DISLOCATION: CPT

## 2023-11-13 PROCEDURE — 73660 X-RAY EXAM OF TOE(S): CPT | Mod: RT

## 2023-11-13 PROCEDURE — 700111 HCHG RX REV CODE 636 W/ 250 OVERRIDE (IP): Mod: UD | Performed by: EMERGENCY MEDICINE

## 2023-11-13 RX ORDER — HYDROMORPHONE HYDROCHLORIDE 1 MG/ML
0.5 INJECTION, SOLUTION INTRAMUSCULAR; INTRAVENOUS; SUBCUTANEOUS ONCE
Status: COMPLETED | OUTPATIENT
Start: 2023-11-13 | End: 2023-11-13

## 2023-11-13 RX ADMIN — HYDROMORPHONE HYDROCHLORIDE 0.5 MG: 1 INJECTION, SOLUTION INTRAMUSCULAR; INTRAVENOUS; SUBCUTANEOUS at 17:16

## 2023-11-13 RX ADMIN — LIDOCAINE HYDROCHLORIDE 20 ML: 10 INJECTION, SOLUTION EPIDURAL; INFILTRATION; INTRACAUDAL at 16:30

## 2023-11-13 ASSESSMENT — FIBROSIS 4 INDEX: FIB4 SCORE: 0.4

## 2023-11-13 NOTE — ED NOTES
Pt wheeled to the room via [place]. Call light within reach. No further complaints at this time. Chart up for ERP.

## 2023-11-13 NOTE — ED TRIAGE NOTES
"Chief Complaint   Patient presents with    Toe Pain     Pt reports she hit her R big toe on the wall yesterday, +deformity noted. Pt reports she has a plate that foot and her toes are fused.      /67   Pulse 75   Temp 36.8 °C (98.2 °F) (Temporal)   Resp 16   Ht 1.6 m (5' 3\")   Wt 43 kg (94 lb 12.8 oz)   LMP 09/21/2011   SpO2 97%   BMI 16.79 kg/m²     Pt ambulatory to triage for above.   "

## 2023-11-13 NOTE — ED PROVIDER NOTES
ED Provider Note    CHIEF COMPLAINT  Chief Complaint   Patient presents with    Toe Pain     Pt reports she hit her R big toe on the wall yesterday, +deformity noted. Pt reports she has a plate that foot and her toes are fused.          HPI/ROS      Mary Martinez is a 53 y.o. female who presents after stubbing her toe on a wall.  Patient reports this occurred a few days ago.  Patient has a longstanding history of severe rheumatoid arthritis, she has had multiple orthopedic surgeries for this.  She follows with Dr. Alves who generally manages her surgical requirements.  Patient reports that she stubbed her first toe, used to point straight while the rest of her toes pointed out laterally, after stubbing her toe her right large toe is now pointing laterally with the rest of her toes and she reports associated pain here.  Patient denies any other injury sustained.    PAST MEDICAL HISTORY   has a past medical history of Acute renal failure (ARF) (HCC), Acute renal failure (HCC) (10/6/2011), Allergy, Anemia, Anxiety, Arthritis, ASTHMA, Blood transfusion without reported diagnosis, Bowel habit changes, Bronchitis (last approx 2018), Chronic pain (04/24/2020), Dental disorder, Depression, GERD (gastroesophageal reflux disease), GIB (gastrointestinal bleeding), Head ache, Heart burn, Hiatal hernia, Hiatus hernia syndrome, History of pancreatitis, Hypokalemia, Hyponatremia, Idiopathic chronic pancreatitis (HCC), Indigestion, Intractable nausea and vomiting, Kidney disease, Leukocytosis (10/8/2011), Pain, Pneumonia (10/2019), PONV (postoperative nausea and vomiting), Psychiatric problem, Rheumatoid aortitis, Rheumatoid arthritis(714.0) (2003), Severe protein-calorie malnutrition (HCC), Small bowel obstruction (HCC) (10/6/2011), SMAS (superior mesenteric artery syndrome) (HCC), Substance abuse (HCC), and Vitamin B12 deficiency neuropathy (HCC).    SURGICAL HISTORY   has a past surgical history that includes abdominal  abscess drainage (9/27/2011); toe fusion (Right, 5/27/2015); bone spur excision (5/27/2015); hammertoe correction (5/27/2015); foot reconstruction rheumatic (Left, 7/29/2015); arthrodesis (Right, 5/6/2016); fusion, pip joint, toe (Right, 8/29/2016); bone graft (Right, 8/29/2016); irrigation & debridement ortho (Right, 9/11/2016); finger amputation (Right, 9/14/2016); finger arthroplasty (Right, 4/17/2017); finger arthroplasty (Right, 6/5/2017); finger amputation (6/5/2017); exploratory of abdomen (N/A, 10/4/2019); tonsillectomy (1982); primary c section (1991); repeat c section (1999); repeat c section (2005); repeat c section (2008); appendectomy (2011); nicholas by laparoscopy (9/27/2011); wrist exploration (Left, 1980's); colonoscopy (2018); dental extraction(s) (2014); endoscopic us exam, esoph (4/29/2020); gastroscopy-endo (4/29/2020); egd with asp/bx (4/29/2020); upper gi endoscopy,diagnosis (N/A, 9/9/2022); egd w/endoscopic ultrasound (N/A, 9/9/2022); place percut gastrostomy tube (N/A, 6/12/2023); upper gi endoscopy,diagnosis (6/12/2023); and upper gi endoscopy,diagnosis (N/A, 8/16/2023).    FAMILY HISTORY  Family History   Problem Relation Age of Onset    Cancer Mother     Heart Disease Mother     Hypertension Mother     Heart Disease Father     Hypertension Father        SOCIAL HISTORY  Social History     Tobacco Use    Smoking status: Every Day     Current packs/day: 0.50     Average packs/day: 0.5 packs/day for 30.0 years (15.0 ttl pk-yrs)     Types: Cigarettes     Passive exposure: Never    Smokeless tobacco: Never   Vaping Use    Vaping Use: Never used   Substance and Sexual Activity    Alcohol use: Never    Drug use: Never    Sexual activity: Not on file       CURRENT MEDICATIONS  Home Medications       Reviewed by Allison Escobar R.N. (Registered Nurse) on 11/13/23 at 1422  Med List Status: Partial     Medication Last Dose Status   acetaminophen (TYLENOL) 500 MG Tab  Active   albuterol 108 (90 Base)  "MCG/ACT Aero Soln inhalation aerosol  Active   folic acid (FOLVITE) 1 MG Tab  Active   Naloxone (NARCAN) 4 MG/0.1ML Liquid  Active   oxyCODONE-acetaminophen (PERCOCET-10)  MG Tab  Active   pantoprazole (PROTONIX) 40 MG Tablet Delayed Response  Active   PARoxetine (PAXIL) 30 MG Tab  Active   predniSONE (DELTASONE) 10 MG Tab  Active   QUEtiapine (SEROQUEL) 100 MG Tab  Active   senna-docusate (PERICOLACE OR SENOKOT S) 8.6-50 MG Tab  Active                    ALLERGIES  Allergies   Allergen Reactions    Penicillins Anaphylaxis and Hives     Tolerates cephalosporins; reports throat swelling with PCN    Aripiprazole Nausea     Spasms, shaking      Nitrous Oxide Vomiting       PHYSICAL EXAM  VITAL SIGNS: /67   Pulse 75   Temp 36.8 °C (98.2 °F) (Temporal)   Resp 16   Ht 1.6 m (5' 3\")   Wt 43 kg (94 lb 12.8 oz)   LMP 09/21/2011   SpO2 97%   BMI 16.79 kg/m²    Physical Exam  Constitutional:       Appearance: Normal appearance.   HENT:      Mouth/Throat:      Mouth: Mucous membranes are moist.   Pulmonary:      Effort: Pulmonary effort is normal.   Musculoskeletal:      Comments: First digit of left foot with laterally deviated MCP with tenderness palpation at this level.  No significant tenderness of the foot otherwise.  Distal pulses are 2+.  Cap refill is instantaneous.   Neurological:      General: No focal deficit present.      Mental Status: She is alert and oriented to person, place, and time.   Psychiatric:         Mood and Affect: Mood normal.           DIAGNOSTIC STUDIES / PROCEDURES      RADIOLOGY  I have independently interpreted the diagnostic imaging associated with this visit and am waiting the final reading from the radiologist.   My preliminary interpretation is as follows: Significant chronic changes of the foot and metatarsals  Radiologist interpretation:   DX-TOE(S) 2+ RIGHT   Final Result         1.    No radiographic evidence of acute injury.      2.  Osteoporosis.      3.  Previous " "ORIF of right first metatarsal and adjacent proximal phalanx.      4.  Chronic \"penciling\" of the second through fifth distal metatarsals consistent with the patient's history of rheumatoid arthritis.            COURSE & MEDICAL DECISION MAKING        INITIAL ASSESSMENT, COURSE AND PLAN  Care Narrative: Patient here with presentation consistent with likely dislocated toe.  Checking x-ray for associated fracture.  X-rays difficult to interpret given patient's multiple chronic orthopedic abnormalities secondary to her severe rheumatoid arthritis.  It is read as negative though patient is adamant that her toe up until she stubbed it was not displaced laterally.  In anticipation for possible reduction I performed a digital block.  3 cc lidocaine without epinephrine were injected in a ring formation around patient's first digit of her affected foot.  I attempted to perform some gentle traction on the toe however it was firmly in place without any ability to reduce without significant amount of force.  I discussed this with orthopedics, they state that especially given patient's chronic rheumatoid arthritis and chronic bony changes a iatrogenic fracture is possible with significant traction at this point and would prefer I do not attempt any further and instead have patient follow-up in their office.        DISPOSITION AND DISCUSSIONS  I have discussed management of the patient with the following physicians and [facility]IM[facility]'s:  [facility]        FINAL DIAGNOSIS  1. Injury of toe on left foot, initial encounter          "

## 2023-11-14 DIAGNOSIS — M05.79 RHEUMATOID ARTHRITIS INVOLVING MULTIPLE SITES WITH POSITIVE RHEUMATOID FACTOR (HCC): ICD-10-CM

## 2023-11-14 RX ORDER — OXYCODONE AND ACETAMINOPHEN 10; 325 MG/1; MG/1
1 TABLET ORAL EVERY 4 HOURS PRN
Qty: 120 TABLET | Refills: 0 | Status: SHIPPED | OUTPATIENT
Start: 2023-11-14 | End: 2023-12-19 | Stop reason: SDUPTHER

## 2023-11-14 NOTE — DISCHARGE INSTRUCTIONS
The orthopedic doctor will call you later tomorrow and see you tomorrow to manage your foot.  Take your pain medication at home.  We are sorry you are unable to do the reduction in the emergency department but given your complicated orthopedic history the orthopedic surgeon thought it best for this to occur in the clinic so as to not cause any further unnecessary trauma to your foot

## 2023-12-18 ENCOUNTER — TELEPHONE (OUTPATIENT)
Dept: MEDICAL GROUP | Facility: CLINIC | Age: 53
End: 2023-12-18
Payer: MEDICAID

## 2023-12-18 DIAGNOSIS — M05.79 RHEUMATOID ARTHRITIS INVOLVING MULTIPLE SITES WITH POSITIVE RHEUMATOID FACTOR (HCC): ICD-10-CM

## 2023-12-19 RX ORDER — OXYCODONE AND ACETAMINOPHEN 10; 325 MG/1; MG/1
1 TABLET ORAL EVERY 4 HOURS PRN
Qty: 120 TABLET | Refills: 0 | Status: SHIPPED | OUTPATIENT
Start: 2023-12-19 | End: 2024-01-06 | Stop reason: SDUPTHER

## 2023-12-19 RX ORDER — OXYCODONE AND ACETAMINOPHEN 10; 325 MG/1; MG/1
1 TABLET ORAL EVERY 4 HOURS PRN
Qty: 120 TABLET | Refills: 0 | Status: SHIPPED | OUTPATIENT
Start: 2024-01-19 | End: 2024-02-16 | Stop reason: SDUPTHER

## 2024-01-05 ENCOUNTER — TELEPHONE (OUTPATIENT)
Dept: MEDICAL GROUP | Facility: CLINIC | Age: 54
End: 2024-01-05
Payer: MEDICAID

## 2024-01-05 DIAGNOSIS — M05.79 RHEUMATOID ARTHRITIS INVOLVING MULTIPLE SITES WITH POSITIVE RHEUMATOID FACTOR (HCC): ICD-10-CM

## 2024-01-06 ENCOUNTER — HOSPITAL ENCOUNTER (EMERGENCY)
Facility: MEDICAL CENTER | Age: 54
End: 2024-01-06
Attending: EMERGENCY MEDICINE
Payer: MEDICAID

## 2024-01-06 ENCOUNTER — APPOINTMENT (OUTPATIENT)
Dept: RADIOLOGY | Facility: MEDICAL CENTER | Age: 54
End: 2024-01-06
Attending: EMERGENCY MEDICINE
Payer: MEDICAID

## 2024-01-06 VITALS
TEMPERATURE: 98 F | DIASTOLIC BLOOD PRESSURE: 78 MMHG | SYSTOLIC BLOOD PRESSURE: 120 MMHG | RESPIRATION RATE: 18 BRPM | WEIGHT: 88.63 LBS | OXYGEN SATURATION: 97 % | HEART RATE: 82 BPM | BODY MASS INDEX: 15.7 KG/M2

## 2024-01-06 DIAGNOSIS — R10.13 EPIGASTRIC ABDOMINAL PAIN: ICD-10-CM

## 2024-01-06 DIAGNOSIS — E86.0 DEHYDRATION: ICD-10-CM

## 2024-01-06 LAB
ALBUMIN SERPL BCP-MCNC: 4.3 G/DL (ref 3.2–4.9)
ALBUMIN/GLOB SERPL: 1 G/DL
ALP SERPL-CCNC: 198 U/L (ref 30–99)
ALT SERPL-CCNC: 7 U/L (ref 2–50)
ANION GAP SERPL CALC-SCNC: 18 MMOL/L (ref 7–16)
APPEARANCE UR: CLEAR
AST SERPL-CCNC: 12 U/L (ref 12–45)
B-OH-BUTYR SERPL-MCNC: 0.3 MMOL/L (ref 0.02–0.27)
BACTERIA #/AREA URNS HPF: NEGATIVE /HPF
BASOPHILS # BLD AUTO: 1.3 % (ref 0–1.8)
BASOPHILS # BLD: 0.11 K/UL (ref 0–0.12)
BILIRUB SERPL-MCNC: 0.3 MG/DL (ref 0.1–1.5)
BILIRUB UR QL STRIP.AUTO: NEGATIVE
BUN SERPL-MCNC: 19 MG/DL (ref 8–22)
CALCIUM ALBUM COR SERPL-MCNC: 9.5 MG/DL (ref 8.5–10.5)
CALCIUM SERPL-MCNC: 9.7 MG/DL (ref 8.5–10.5)
CHLORIDE SERPL-SCNC: 107 MMOL/L (ref 96–112)
CO2 SERPL-SCNC: 11 MMOL/L (ref 20–33)
COLOR UR: YELLOW
CREAT SERPL-MCNC: 0.75 MG/DL (ref 0.5–1.4)
EKG IMPRESSION: NORMAL
EOSINOPHIL # BLD AUTO: 0.13 K/UL (ref 0–0.51)
EOSINOPHIL NFR BLD: 1.5 % (ref 0–6.9)
EPI CELLS #/AREA URNS HPF: NORMAL /HPF
ERYTHROCYTE [DISTWIDTH] IN BLOOD BY AUTOMATED COUNT: 54.5 FL (ref 35.9–50)
GFR SERPLBLD CREATININE-BSD FMLA CKD-EPI: 96 ML/MIN/1.73 M 2
GLOBULIN SER CALC-MCNC: 4.5 G/DL (ref 1.9–3.5)
GLUCOSE SERPL-MCNC: 119 MG/DL (ref 65–99)
GLUCOSE UR STRIP.AUTO-MCNC: NEGATIVE MG/DL
HCT VFR BLD AUTO: 44.4 % (ref 37–47)
HGB BLD-MCNC: 14.5 G/DL (ref 12–16)
HYALINE CASTS #/AREA URNS LPF: NORMAL /LPF
IMM GRANULOCYTES # BLD AUTO: 0.03 K/UL (ref 0–0.11)
IMM GRANULOCYTES NFR BLD AUTO: 0.3 % (ref 0–0.9)
KETONES UR STRIP.AUTO-MCNC: NEGATIVE MG/DL
LACTATE SERPL-SCNC: 1.5 MMOL/L (ref 0.5–2)
LEUKOCYTE ESTERASE UR QL STRIP.AUTO: NEGATIVE
LIPASE SERPL-CCNC: 43 U/L (ref 11–82)
LYMPHOCYTES # BLD AUTO: 1.67 K/UL (ref 1–4.8)
LYMPHOCYTES NFR BLD: 19.4 % (ref 22–41)
MAGNESIUM SERPL-MCNC: 2.4 MG/DL (ref 1.5–2.5)
MCH RBC QN AUTO: 31.4 PG (ref 27–33)
MCHC RBC AUTO-ENTMCNC: 32.7 G/DL (ref 32.2–35.5)
MCV RBC AUTO: 96.1 FL (ref 81.4–97.8)
MICRO URNS: ABNORMAL
MONOCYTES # BLD AUTO: 0.44 K/UL (ref 0–0.85)
MONOCYTES NFR BLD AUTO: 5.1 % (ref 0–13.4)
NEUTROPHILS # BLD AUTO: 6.23 K/UL (ref 1.82–7.42)
NEUTROPHILS NFR BLD: 72.4 % (ref 44–72)
NITRITE UR QL STRIP.AUTO: NEGATIVE
NRBC # BLD AUTO: 0 K/UL
NRBC BLD-RTO: 0 /100 WBC (ref 0–0.2)
PH UR STRIP.AUTO: 6.5 [PH] (ref 5–8)
PLATELET # BLD AUTO: 471 K/UL (ref 164–446)
PMV BLD AUTO: 9.7 FL (ref 9–12.9)
POTASSIUM SERPL-SCNC: 3.6 MMOL/L (ref 3.6–5.5)
PROT SERPL-MCNC: 8.8 G/DL (ref 6–8.2)
PROT UR QL STRIP: 30 MG/DL
RBC # BLD AUTO: 4.62 M/UL (ref 4.2–5.4)
RBC # URNS HPF: NORMAL /HPF
RBC UR QL AUTO: NEGATIVE
SODIUM SERPL-SCNC: 136 MMOL/L (ref 135–145)
SP GR UR STRIP.AUTO: 1.02
TROPONIN T SERPL-MCNC: 11 NG/L (ref 6–19)
TROPONIN T SERPL-MCNC: 13 NG/L (ref 6–19)
UROBILINOGEN UR STRIP.AUTO-MCNC: 0.2 MG/DL
WBC # BLD AUTO: 8.6 K/UL (ref 4.8–10.8)
WBC #/AREA URNS HPF: NORMAL /HPF

## 2024-01-06 PROCEDURE — 700105 HCHG RX REV CODE 258: Mod: UD | Performed by: EMERGENCY MEDICINE

## 2024-01-06 PROCEDURE — 36415 COLL VENOUS BLD VENIPUNCTURE: CPT

## 2024-01-06 PROCEDURE — 96376 TX/PRO/DX INJ SAME DRUG ADON: CPT

## 2024-01-06 PROCEDURE — 71045 X-RAY EXAM CHEST 1 VIEW: CPT

## 2024-01-06 PROCEDURE — 80053 COMPREHEN METABOLIC PANEL: CPT

## 2024-01-06 PROCEDURE — 83605 ASSAY OF LACTIC ACID: CPT

## 2024-01-06 PROCEDURE — 700111 HCHG RX REV CODE 636 W/ 250 OVERRIDE (IP): Mod: JZ,UD | Performed by: EMERGENCY MEDICINE

## 2024-01-06 PROCEDURE — 85025 COMPLETE CBC W/AUTO DIFF WBC: CPT

## 2024-01-06 PROCEDURE — 81001 URINALYSIS AUTO W/SCOPE: CPT

## 2024-01-06 PROCEDURE — 83735 ASSAY OF MAGNESIUM: CPT

## 2024-01-06 PROCEDURE — 96375 TX/PRO/DX INJ NEW DRUG ADDON: CPT

## 2024-01-06 PROCEDURE — 82010 KETONE BODYS QUAN: CPT

## 2024-01-06 PROCEDURE — 700117 HCHG RX CONTRAST REV CODE 255: Performed by: EMERGENCY MEDICINE

## 2024-01-06 PROCEDURE — 83690 ASSAY OF LIPASE: CPT

## 2024-01-06 PROCEDURE — 74177 CT ABD & PELVIS W/CONTRAST: CPT

## 2024-01-06 PROCEDURE — 93005 ELECTROCARDIOGRAM TRACING: CPT | Performed by: EMERGENCY MEDICINE

## 2024-01-06 PROCEDURE — 96374 THER/PROPH/DIAG INJ IV PUSH: CPT | Mod: XU

## 2024-01-06 PROCEDURE — 84484 ASSAY OF TROPONIN QUANT: CPT

## 2024-01-06 PROCEDURE — 99285 EMERGENCY DEPT VISIT HI MDM: CPT

## 2024-01-06 PROCEDURE — 93005 ELECTROCARDIOGRAM TRACING: CPT

## 2024-01-06 RX ORDER — ONDANSETRON 2 MG/ML
4 INJECTION INTRAMUSCULAR; INTRAVENOUS ONCE
Status: COMPLETED | OUTPATIENT
Start: 2024-01-06 | End: 2024-01-06

## 2024-01-06 RX ORDER — ONDANSETRON 4 MG/1
4 TABLET, ORALLY DISINTEGRATING ORAL EVERY 6 HOURS PRN
Qty: 10 TABLET | Refills: 0 | Status: SHIPPED | OUTPATIENT
Start: 2024-01-06 | End: 2024-02-07

## 2024-01-06 RX ORDER — MORPHINE SULFATE 4 MG/ML
4 INJECTION INTRAVENOUS ONCE
Status: COMPLETED | OUTPATIENT
Start: 2024-01-06 | End: 2024-01-06

## 2024-01-06 RX ORDER — SODIUM CHLORIDE 9 MG/ML
1000 INJECTION, SOLUTION INTRAVENOUS ONCE
Status: COMPLETED | OUTPATIENT
Start: 2024-01-06 | End: 2024-01-06

## 2024-01-06 RX ORDER — MORPHINE SULFATE 4 MG/ML
6 INJECTION INTRAVENOUS ONCE
Status: COMPLETED | OUTPATIENT
Start: 2024-01-06 | End: 2024-01-06

## 2024-01-06 RX ORDER — SODIUM CHLORIDE, SODIUM LACTATE, POTASSIUM CHLORIDE, CALCIUM CHLORIDE 600; 310; 30; 20 MG/100ML; MG/100ML; MG/100ML; MG/100ML
1000 INJECTION, SOLUTION INTRAVENOUS ONCE
Status: COMPLETED | OUTPATIENT
Start: 2024-01-06 | End: 2024-01-06

## 2024-01-06 RX ORDER — QUETIAPINE FUMARATE 100 MG/1
100 TABLET, FILM COATED ORAL EVERY EVENING
Qty: 90 TABLET | Refills: 0 | Status: SHIPPED | OUTPATIENT
Start: 2024-01-06 | End: 2024-02-16 | Stop reason: SDUPTHER

## 2024-01-06 RX ORDER — OXYCODONE AND ACETAMINOPHEN 10; 325 MG/1; MG/1
1 TABLET ORAL EVERY 4 HOURS PRN
Qty: 120 TABLET | Refills: 0 | Status: SHIPPED | OUTPATIENT
Start: 2024-01-06 | End: 2024-01-11

## 2024-01-06 RX ORDER — PAROXETINE 30 MG/1
TABLET, FILM COATED ORAL
Qty: 180 TABLET | Refills: 0 | Status: SHIPPED | OUTPATIENT
Start: 2024-01-06

## 2024-01-06 RX ADMIN — IOHEXOL 80 ML: 350 INJECTION, SOLUTION INTRAVENOUS at 19:00

## 2024-01-06 RX ADMIN — SODIUM CHLORIDE, POTASSIUM CHLORIDE, SODIUM LACTATE AND CALCIUM CHLORIDE 1000 ML: 600; 310; 30; 20 INJECTION, SOLUTION INTRAVENOUS at 17:20

## 2024-01-06 RX ADMIN — ONDANSETRON 4 MG: 2 INJECTION INTRAMUSCULAR; INTRAVENOUS at 15:51

## 2024-01-06 RX ADMIN — SODIUM CHLORIDE 1000 ML: 9 INJECTION, SOLUTION INTRAVENOUS at 15:52

## 2024-01-06 RX ADMIN — MORPHINE SULFATE 4 MG: 4 INJECTION, SOLUTION INTRAMUSCULAR; INTRAVENOUS at 18:48

## 2024-01-06 RX ADMIN — MORPHINE SULFATE 6 MG: 4 INJECTION, SOLUTION INTRAMUSCULAR; INTRAVENOUS at 15:51

## 2024-01-06 ASSESSMENT — FIBROSIS 4 INDEX: FIB4 SCORE: 0.4

## 2024-01-06 ASSESSMENT — PAIN DESCRIPTION - PAIN TYPE: TYPE: ACUTE PAIN

## 2024-01-06 NOTE — TELEPHONE ENCOUNTER
Refilled percocet for another month. Pt will need apt for further refills. Office will call to tell patient as she is not on mychart.

## 2024-01-06 NOTE — TELEPHONE ENCOUNTER
VOICEMAIL  1. Caller Name: Mary Martinez                       Call Back Number: [phone]     2. Message: patient will be out of her pain medication on Monday.     3. Patient approves office to leave a detailed voicemail/MyChart message: N\A

## 2024-01-06 NOTE — ED PROVIDER NOTES
"ED Provider Note    CHIEF COMPLAINT  Chief Complaint   Patient presents with    Nausea/Vomiting/Diarrhea     Pt reports liquid diarrhea    Chest Pain     Reports /10 \"sharp\" non radiating substernal chest pain since aprox      EXTERNAL RECORDS REVIEWED  Records show the patient was last admitted in  for abdominal pain. She has a history of chronic abdominal pain and SMA syndrome. She had a G-tube removed recently that was in place due to feeding intolerance.     HPI/ROS  LIMITATION TO HISTORY   Select: : None  OUTSIDE HISTORIAN(S):  None    Mary Martinez is a 53 y.o. female with a history of SMA syndrome who presents to the Emergency Department with 9/10 intermittent abdominal pain onset 3 weeks ago. The patient reports her pain began to worsen on Thursday. She reports associated 9/10 sharp substernal chest pain, dizziness, nausea, vomiting, and diarrhea. She notes she has been taking her omeprazole and watching what she eats so as to not cause acid reflux, but she is still experiencing pain in her upper abdomen/chest. She notes her symptoms are similar to previous episodes caused by her SMA. She denies any blood in her stools or vomit. She reports her G-tube was taken out recently and she has been able to tolerate foods. The patient notes she has lost approximately 15 pounds since . She reports a history of 4  sections, appendectomy, and cholecystectomy. The patient notes she takes percocet  mg every 4 hours for pain and began taking half doses recently, but needed to take full doses because of her current pain.    PAST MEDICAL HISTORY  Past Medical History:   Diagnosis Date    Acute renal failure (ARF) (HCC)     Acute renal failure (HCC) 10/06/2011    Allergy     Anemia     Anxiety     Arthritis     Rheumatoid -follows with rheumatologist. Has several fractures due to RA.    ASTHMA     inhalers prn    Blood transfusion without reported diagnosis     Bowel habit " changes     4/24/20-constipation and diarrhea.    Bronchitis last approx 2018    Chronic pain 04/24/2020    Due to RA    Dental disorder     no teeth upper-does not wear her denture. Broken teeth on bottom.    Depression     GERD (gastroesophageal reflux disease)     GIB (gastrointestinal bleeding)     Head ache     Heart burn     taking famotidine    Hiatal hernia     Hiatus hernia syndrome     History of pancreatitis     Hypokalemia     Hyponatremia     Idiopathic chronic pancreatitis (HCC)     Indigestion     taking famotidine    Intractable nausea and vomiting     Kidney disease     Leukocytosis 10/08/2011    Pain     CPS-everywhere    Pneumonia 10/2019    PONV (postoperative nausea and vomiting)     Psychiatric problem     anxiety and depression    Rheumatoid aortitis     Rheumatoid arthritis(714.0) 2003    Severe protein-calorie malnutrition (HCC)     Small bowel obstruction (HCC) 10/06/2011    SMAS (superior mesenteric artery syndrome) (HCC)     Substance abuse (HCC)     UTI (urinary tract infection) 05/25/2023    Vitamin B12 deficiency neuropathy (HCC)         SURGICAL HISTORY  Past Surgical History:   Procedure Laterality Date    NH UPPER GI ENDOSCOPY,DIAGNOSIS N/A 8/16/2023    Procedure: GASTROSCOPY;  Surgeon: Blossom Peres M.D.;  Location: SURGERY SAME DAY [facility];  Service: Gastroenterology    NH PLACE PERCUT GASTROSTOMY TUBE N/A 6/12/2023    Procedure: INSERTION, PEG TUBE - PEG AND J TUBE PLACEMENT;  Surgeon: Marilyn Anderson M.D.;  Location: SURGERY SAME DAY [facility];  Service: Gastroenterology    NH UPPER GI ENDOSCOPY,DIAGNOSIS  6/12/2023    Procedure: GASTROSCOPY;  Surgeon: Marilyn Anderson M.D.;  Location: SURGERY SAME DAY [facility];  Service: Gastroenterology    NH UPPER GI ENDOSCOPY,DIAGNOSIS N/A 9/9/2022    Procedure: ENDOSCOPIC ULTRASOUND- UPPER;  Surgeon: Jorge Brooke M.D.;  Location: SURGERY [facility];  Service: EUS    EGD W/ENDOSCOPIC ULTRASOUND N/A 9/9/2022    Procedure:  EGD, WITH ENDOSCOPIC US;  Surgeon: Jorge Brooke M.D.;  Location: [facility];  Service: EUS    [place] ENDOSCOPIC US EXAM, ESOPH  4/29/2020    Procedure: EGD, WITH ENDOSCOPIC US;  Surgeon: Jorge Brooke M.D.;  Location: [facility];  Service: Gastroenterology    GASTROSCOPY-ENDO  4/29/2020    Procedure: GASTROSCOPY;  Surgeon: Jorge Brooke M.D.;  Location: [facility];  Service: Gastroenterology    EGD WITH ASP/BX  4/29/2020    Procedure: EGD, WITH ASPIRATION BIOPSY - POSS FNA;  Surgeon: Jorge Brooke M.D.;  Location: [facility];  Service: Gastroenterology    [place] EXPLORATORY OF ABDOMEN N/A 10/4/2019    Procedure: LAPAROTOMY, EXPLORATORY AND REPAIR OF DUODENAL PERFORATION;  Surgeon: James Dumont M.D.;  Location: [facility];  Service: General    COLONOSCOPY  2018    with upper endoscopy    FINGER ARTHROPLASTY Right 6/5/2017    Procedure: FINGER ARTHROPLASTY - LONG, RING AND SMALL VOLAR PLATE;  Surgeon: Lobo Rosen M.D.;  Location: SURGERY SAME DAY [facility];  Service:     FINGER AMPUTATION  6/5/2017    Procedure: FINGER AMPUTATION - LONG, RING AND SMALL AT THE PROXIMAL INTERPHALANGEAL JOINT;  Surgeon: Lobo Rosen M.D.;  Location: SURGERY SAME DAY [facility];  Service:     FINGER ARTHROPLASTY Right 4/17/2017    Procedure: FINGER ARTHROPLASTY ;  Surgeon: Lobo Rosen M.D.;  Location: SURGERY SAME DAY [facility];  Service:     FINGER AMPUTATION Right 9/14/2016    Procedure: FINGER AMPUTATION INDEX;  Surgeon: Lobo Rosen M.D.;  Location: SURGERY SAME DAY [facility];  Service:     IRRIGATION & DEBRIDEMENT ORTHO Right 9/11/2016    Procedure: IRRIGATION & DEBRIDEMENT ORTHO - right index finger;  Surgeon: Madhu Rosen M.D.;  Location: [facility];  Service:     FUSION, PIP JOINT, TOE Right 8/29/2016    Procedure: RE-DO INDEX FINGER PROXIMAL INTERPHALANGEAL  ARTHRODESIS;  Surgeon: Lobo Rosen M.D.;  Location: SURGERY SAME DAY [facility];  Service:     BONE GRAFT Right 8/29/2016    Procedure: BONE GRAFT - DISTAL RADIUS ;  Surgeon: Lobo Rosen M.D.;  Location: SURGERY SAME DAY [facility];  Service:     ARTHRODESIS Right 5/6/2016    Procedure: ARTHRODESIS INDEX FINGER PROXIMAL INTERPHALANGEAL;  Surgeon: Lobo Rosen M.D.;  Location: SURGERY SAME DAY [facility];  Service:     FOOT RECONSTRUCTION RHEUMATIC Left 7/29/2015    Procedure: FOOT RECONSTRUCTION RHEUMATIC;  Surgeon: Heriberto Alves M.D.;  Location: SURGERY [facility];  Service:     TOE FUSION Right 5/27/2015    Procedure: TOE FUSION 1ST METATARSALPHALANGEAL JOINT;  Surgeon: Heriberto Alves M.D.;  Location: SURGERY [facility];  Service:     BONE SPUR EXCISION  5/27/2015    Procedure: BONE SPUR EXCISION METATARSAL HEAD 2-3;  Surgeon: Heriberto Alves M.D.;  Location: SURGERY [facility];  Service:     HAMMERTOE CORRECTION  5/27/2015    Procedure: HAMMERTOE CORRECTION AND SOFT TISSUE RE-ALINGMENT 2-3 ;  Surgeon: Heriberto Alves M.D.;  Location: SURGERY [facility];  Service:     DENTAL EXTRACTION(S)  2014    all of upper teeth    ABDOMINAL ABSCESS DRAINAGE  9/27/2011    Performed by VERO WRIGHT at SURGERY [facility]    EMANUEL BY LAPAROSCOPY  9/27/2011    Performed by VERO WRIGHT at [facility]    APPENDECTOMY  2011    Found out it had ruptured prior to abcess surgery    REPEAT C SECTION  2008    REPEAT C SECTION  2005    REPEAT C SECTION  1999    PRIMARY C SECTION  1991    TONSILLECTOMY  1982    WRIST EXPLORATION Left 1980's    fractured wrist-no hardware        FAMILY HISTORY  Family History   Problem Relation Age of Onset    Cancer Mother     Heart Disease Mother     Hypertension Mother     Heart Disease Father     Hypertension Father        SOCIAL HISTORY   reports that she quit smoking about 4 weeks ago. Her smoking use included  cigarettes. She has a 15.0 pack-year smoking history. She has never been exposed to tobacco smoke. She has never used smokeless tobacco. She reports that she does not drink alcohol and does not use drugs.    CURRENT MEDICATIONS  Previous Medications    ACETAMINOPHEN (TYLENOL) 500 MG TAB    Take 1,000 mg by mouth one time as needed for Mild Pain. 2 tablets = 1,000 MG    ALBUTEROL 108 (90 BASE) MCG/ACT AERO SOLN INHALATION AEROSOL    Inhale 2 Puffs every four hours as needed for Shortness of Breath.    FOLIC ACID (FOLVITE) 1 MG TAB    Take 1 Tablet by mouth every day.    NALOXONE (NARCAN) 4 MG/0.1ML LIQUID    One spray in one nostril for overdose and call 911.    OXYCODONE-ACETAMINOPHEN (PERCOCET-10)  MG TAB    Take 1 Tablet by mouth every four hours as needed for Severe Pain for up to 30 days. Indications: Pain    OXYCODONE-ACETAMINOPHEN (PERCOCET-10)  MG TAB    Take 1 Tablet by mouth every four hours as needed for Severe Pain for up to 30 days. Indications: Pain    PANTOPRAZOLE (PROTONIX) 40 MG TABLET DELAYED RESPONSE    TAKE 1 TABLET BY MOUTH TWICE DAILY 30 MINUTES BEFORE MEALS    PAROXETINE (PAXIL) 30 MG TAB    Take 2 Tablets by mouth every evening. 2 tablets = 60 mg.    PREDNISONE (DELTASONE) 10 MG TAB    Take 4 Tablets by mouth every day for 5 days, THEN 3 Tablets every day for 5 days, THEN 2 Tablets every day for 5 days, THEN 1 Tablet every day for 90 days.    QUETIAPINE (SEROQUEL) 100 MG TAB    Take 1 Tablet by mouth every evening.    SENNA-DOCUSATE (PERICOLACE OR SENOKOT S) 8.6-50 MG TAB    Take 1 Tablet by mouth 2 times a day.       ALLERGIES  Penicillins, Aripiprazole, and Nitrous oxide    PHYSICAL EXAM  /83   Pulse (!) 112   Temp 35.9 °C (96.7 °F) (Temporal)   Resp 14   Wt 40.2 kg (88 lb 10 oz)   SpO2 98%      Constitutional: Nontoxic appearing. Alert in no apparent distress.  HENT: Normocephalic, Atraumatic. Bilateral external ears normal. Nose normal.  Moist mucous membranes.   Oropharynx clear.  Eyes: Pupils are equal and reactive. Conjunctiva normal.   Neck: Supple, full range of motion  Heart: Tachycardic with regular rhythm.  No murmurs.    Lungs: No respiratory distress, normal work of breathing. Lungs clear to auscultation bilaterally.  Abdomen Soft, no distention.  Epigastric tenderness.  Musculoskeletal: Atraumatic. No obvious deformities noted.  No lower extremity edema.  Skin: Warm, Dry.  No erythema, No rash.   Neurologic: Alert and oriented x3. Moving all extremities spontaneously without focal deficits.  Psychiatric: Affect normal, Mood normal, Appears appropriate and not intoxicated.     DIAGNOSTIC STUDIES / PROCEDURES    EKG  I have independently interpreted this EKG  Results for orders placed or performed during the hospital encounter of 24   EKG   Result Value Ref Range    Report       [facility] Emergency Dept.    Test Date:  2024  Pt Name:    STEFFANIE ELLISON                Department: ER  MRN:        [id number]                      Room:       [facility]  Gender:     Female                       Technician: [id number]  :        1970                   Requested By:ER TRIAGE PROTOCOL  Order #:    [id number]                    Reading MD: Sapphire Carlos MD    Measurements  Intervals                                Axis  Rate:       98                           P:          87  IL:         185                          QRS:        89  QRSD:       91                           T:          33  QT:         391  QTc:        500    Interpretive Statements  Sinus rhythm  Biatrial enlargement  Low voltage, extremity leads  Borderline prolonged QT interval  No acute ST or T wave change  Compared to ECG 2023 16:26:02  No significant change from prior  Electronically Signed On 2024 15:58:59 PST by Sapphire Carlos MD          LABS  Labs Reviewed   CBC WITH DIFFERENTIAL - Abnormal; Notable for the following components:       Result Value    RDW 54.5 (*)      Platelet Count 471 (*)     Neutrophils-Polys 72.40 (*)     Lymphocytes 19.40 (*)     All other components within normal limits    Narrative:     Biotin intake of greater than 5 mg per day may interfere with  troponin levels, causing false low values.   COMP METABOLIC PANEL - Abnormal; Notable for the following components:    Co2 11 (*)     Anion Gap 18.0 (*)     Glucose 119 (*)     Alkaline Phosphatase 198 (*)     Total Protein 8.8 (*)     Globulin 4.5 (*)     All other components within normal limits    Narrative:     Biotin intake of greater than 5 mg per day may interfere with  troponin levels, causing false low values.   URINALYSIS - Abnormal; Notable for the following components:    Protein 30 (*)     All other components within normal limits   BETA-HYDROXYBUTYRIC ACID - Abnormal; Notable for the following components:    beta-Hydroxybutyric Acid 0.30 (*)     All other components within normal limits   LIPASE    Narrative:     Biotin intake of greater than 5 mg per day may interfere with  troponin levels, causing false low values.   TROPONIN    Narrative:     Biotin intake of greater than 5 mg per day may interfere with  troponin levels, causing false low values.   TROPONIN    Narrative:     Biotin intake of greater than 5 mg per day may interfere with  troponin levels, causing false low values.   ESTIMATED GFR    Narrative:     Biotin intake of greater than 5 mg per day may interfere with  troponin levels, causing false low values.   LACTIC ACID   MAGNESIUM    Narrative:     Biotin intake of greater than 5 mg per day may interfere with  troponin levels, causing false low values.   URINE MICROSCOPIC (W/UA)        RADIOLOGY  I have independently interpreted the diagnostic imaging associated with this visit and am waiting the final reading from the radiologist.   My preliminary interpretation is a follows: No bowel obstruction  Radiologist interpretation:   CT-ABDOMEN-PELVIS WITH   Final Result      1.  Post  cholecystectomy.      2.  There is some interval increase in dilatation of common bile duct and intrahepatic biliary ducts. Common duct stone is a possibility.      3.  Left-sided nephrolithiasis again noted.      DX-CHEST-PORTABLE (1 VIEW)   Final Result      No acute cardiopulmonary abnormality.           COURSE & MEDICAL DECISION MAKING  3:15 PM - Patient seen and examined at bedside. Discussed plan of care, including obtaining labs and imaging to monitor her symptoms. Patient agrees to the plan of care. The patient will be resuscitated with 1L NS IV and medicated with morphine 6 mg injection for her pain and Zofran 4 mg injection for her nausea. Ordered for CT-Abdomen-Pelvis w/, DX-Chest, EKG, CBC w/ diff, CMP, lipase, UA, and troponin to evaluate her symptoms.      ED Observation Status? Yes; I am placing the patient in to an observation status due to a diagnostic uncertainty as well as therapeutic intensity. Patient placed in observation status at 3:20 PM, 1/6/2024.     Observation plan is as follows: Labs/imaging, symptom management    Upon Reevaluation, the patient's condition has: Improved; and will be discharged.    Patient discharged from ED Observation status at 7:07 PM (Time) 1/6/2024 (Date).     INITIAL ASSESSMENT, COURSE AND PLAN  Care Narrative: Patient with history of chronic abdominal pain who presents with acute onset of epigastric abdominal pain, vomiting and diarrhea.  She is tachycardic on arrival with otherwise reassuring vital signs and nontoxic-appearing.  She is having some pain radiating to her chest therefore EKG was performed that was unchanged from prior without acute ischemia or dysrhythmia.  Labs are reassuring without leukocytosis, transaminitis, elevated lipase.  She does have low CO2 however lactate and beta hydroxybutyrate were normal without concern for sepsis, severe dehydration, DKA.  I suspect that this is likely related to dehydration.  She has no other significant  electrolyte abnormalities.  Imaging was performed showing some dilation of her common bile duct which appears chronic however is somewhat worsening.  I think this is likely more related to her prior cholecystectomy than a common duct stone as she has otherwise normal labs without signs of an obstructive process.    Upon reassessment, patient is resting comfortably with normal vital signs.  No new complaints at this time.  She report significant improvement of her symptoms and is tolerating water without difficulty.  Discussed results with patient and/or family as well as importance of primary care/gastroenterology follow up.  Patient understands plan of care and strict return precautions for new or changing symptoms.         ADDITIONAL PROBLEM LIST  Problem #1: Acute on chronic epigastric abdominal pain -workup reassuring, discharged home with continuation of home medication and follow-up with GI    Problem #2: Nausea and vomiting -resolved at this time, discharged with Zofran    Problem #3: Dehydration - given IV fluids    DISPOSITION AND DISCUSSIONS  Escalation of care considered, and ultimately not performed: Acute inpatient management      Decision tools and prescription drugs considered including, but not limited to: Pain Medications patient already on chronic pain medication .    The patient will return for new or worsening symptoms and is stable at the time of discharge.    The patient is referred to a primary physician for blood pressure management, diabetic screening, and for all other preventative health concerns.    DISPOSITION:  Patient will be discharged home in stable condition.    FOLLOW UP:  West Aguilera M.D.  [address] [facility] [id number] [phone]    Schedule an appointment as soon as possible for a visit       [facility], Emergency Dept  [address] [facility] [id number] [phone]    If symptoms worsen      OUTPATIENT MEDICATIONS:  Discharge Medication  List as of 1/6/2024  7:13 PM        START taking these medications    Details   QUEtiapine (SEROQUEL) 100 MG Tab TAKE 1 TABLET BY MOUTH EVERY EVENING, Disp-90 Tablet, R-0, Normal      PARoxetine (PAXIL) 30 MG Tab TAKE 2 TABLETS(60 MG) BY MOUTH EVERY EVENING, Disp-180 Tablet, R-0, Normal      ondansetron (ZOFRAN ODT) 4 MG TABLET DISPERSIBLE Take 1 Tablet by mouth every 6 hours as needed for Nausea/Vomiting., Disp-10 Tablet, R-0, Normal              FINAL DIAGNOSIS  1. Epigastric abdominal pain    2. Dehydration        The note accurately reflects work and decisions made by me.  Sapphire Carlos M.D.  1/6/2024  8:26 PM     Hector MOLINA (Thomasibe), am scribing for, and in the presence of, Sapphire Carlos M.D..    Electronically signed by: Hector Sebastian (Ruby), 1/6/2024    Sapphire MOLINA M.D. personally performed the services described in this documentation, as scribed by Hector Sebastian in my presence, and it is both accurate and complete.

## 2024-01-06 NOTE — ED TRIAGE NOTES
"Mary Martinez  53 y.o. female  Chief Complaint   Patient presents with    Nausea/Vomiting/Diarrhea     Pt reports liquid diarrhea    Chest Pain     Reports 9/10 \"sharp\" non radiating substernal chest pain since aprox 12/18         Pt amb to triage with steady gait for above complaint. Pt has a hx of GI issues and abd surgeries.  Pt is alert and oriented, speaking in full sentences, follows commands and responds appropriately to questions. Not in any apparent distress. Respirations are even and unlabored.  Pt placed in lobby. Pt educated on triage process. Pt encouraged to alert staff for any changes.    "

## 2024-01-07 NOTE — DISCHARGE INSTRUCTIONS
FOLLOWUP VISIT NOTE    PRESENTING COMPLAINT:  Followup visit for hidradenitis suppurativa.    HISTORY OF PRESENTING COMPLAINT:  Mr. Padilla is 37 years old, well known to my service.  In the interim, he has been doing medical treatments with Dr. Ruiz and has optimized quite a bit.  He continues to have a couple of specific areas, one in the right groin and one in the right perineal area, that are causing significant issues and would like those removed.  Otherwise, he continues to smoke about 1/2 pack a day.  No change otherwise in his history and physical exam except for starting dapsone.    PHYSICAL EXAMINATION:  Vital signs are stable.  He is afebrile, in minimal distress.  He has a palpable nodule in the right groin and in the perineal area.  He has no obvious infection currently.    ASSESSMENT AND PLAN:  Based upon the above findings, a diagnosis of hidradenitis suppurativa was made.  I discussed with the patient removal of the 2 areas in question with allowing it to heal in secondarily.  I advised to be off of all nicotine products.  He understood.  All risks, benefits and alternatives of the procedure, including pain, infection, bleeding, scarring, asymmetry, seromas, hematomas, wound breakdown, wound dehiscence, long-term wound-healing issues, recurrences, injury to deeper structures, DVT, PE, MI, CVA, pneumonia, renal failure and death, were explained.  He understood everything and wants to proceed.  I look forward to helping him out in the near future.  All exam and discussion from beginning to end done in the presence of my nurse, Annalee Chavira.    Total time spent with chart review, the visit itself and post visit paperwork was 30 minutes.       You were seen in the Emergency Department for abdominal pain and vomiting.    EKG labs, imaging were completed without significant acute abnormalities.    Please use 1,000mg of tylenol every 6 hours as needed for pain in addition to your home pain medication.  Take nausea medication as directed.  Drink plenty of fluids.    Please follow up with your primary care physician and GI doctor.    Return to the Emergency Department with fevers, controlled pain, persistent vomiting, or other concerns.

## 2024-01-07 NOTE — ED NOTES
Patient provided discharge instructions. Patient verbalized understanding. Patient leaving ER in stable condition.  IV removed.

## 2024-01-11 DIAGNOSIS — G89.4 CHRONIC PAIN SYNDROME: ICD-10-CM

## 2024-01-11 DIAGNOSIS — M05.79 RHEUMATOID ARTHRITIS INVOLVING MULTIPLE SITES WITH POSITIVE RHEUMATOID FACTOR (HCC): ICD-10-CM

## 2024-01-11 RX ORDER — OXYCODONE AND ACETAMINOPHEN 7.5; 325 MG/1; MG/1
1 TABLET ORAL EVERY 4 HOURS PRN
Qty: 120 TABLET | Refills: 0 | Status: SHIPPED | OUTPATIENT
Start: 2024-01-11 | End: 2024-02-07

## 2024-02-07 ENCOUNTER — APPOINTMENT (OUTPATIENT)
Dept: RADIOLOGY | Facility: MEDICAL CENTER | Age: 54
DRG: 393 | End: 2024-02-07
Payer: MEDICAID

## 2024-02-07 ENCOUNTER — APPOINTMENT (OUTPATIENT)
Dept: RADIOLOGY | Facility: MEDICAL CENTER | Age: 54
DRG: 393 | End: 2024-02-07
Attending: EMERGENCY MEDICINE
Payer: MEDICAID

## 2024-02-07 ENCOUNTER — HOSPITAL ENCOUNTER (INPATIENT)
Facility: MEDICAL CENTER | Age: 54
LOS: 2 days | DRG: 393 | End: 2024-02-09
Attending: EMERGENCY MEDICINE | Admitting: FAMILY MEDICINE
Payer: MEDICAID

## 2024-02-07 DIAGNOSIS — R11.2 NAUSEA AND VOMITING, UNSPECIFIED VOMITING TYPE: ICD-10-CM

## 2024-02-07 PROBLEM — F41.9 ANXIETY AND DEPRESSION: Status: ACTIVE | Noted: 2024-02-07

## 2024-02-07 PROBLEM — R10.84 GENERALIZED ABDOMINAL PAIN: Status: ACTIVE | Noted: 2024-02-07

## 2024-02-07 PROBLEM — F32.A ANXIETY AND DEPRESSION: Status: ACTIVE | Noted: 2024-02-07

## 2024-02-07 LAB
ALBUMIN SERPL BCP-MCNC: 3.5 G/DL (ref 3.2–4.9)
ALBUMIN/GLOB SERPL: 0.9 G/DL
ALP SERPL-CCNC: 230 U/L (ref 30–99)
ALT SERPL-CCNC: 13 U/L (ref 2–50)
ANION GAP SERPL CALC-SCNC: 14 MMOL/L (ref 7–16)
APPEARANCE UR: CLEAR
AST SERPL-CCNC: 10 U/L (ref 12–45)
BACTERIA #/AREA URNS HPF: NEGATIVE /HPF
BASOPHILS # BLD AUTO: 0.5 % (ref 0–1.8)
BASOPHILS # BLD: 0.04 K/UL (ref 0–0.12)
BILIRUB SERPL-MCNC: 0.2 MG/DL (ref 0.1–1.5)
BILIRUB UR QL STRIP.AUTO: NEGATIVE
BUN SERPL-MCNC: 9 MG/DL (ref 8–22)
CALCIUM ALBUM COR SERPL-MCNC: 9 MG/DL (ref 8.5–10.5)
CALCIUM SERPL-MCNC: 8.6 MG/DL (ref 8.5–10.5)
CHLORIDE SERPL-SCNC: 112 MMOL/L (ref 96–112)
CO2 SERPL-SCNC: 11 MMOL/L (ref 20–33)
COLOR UR: YELLOW
CREAT SERPL-MCNC: 0.58 MG/DL (ref 0.5–1.4)
EOSINOPHIL # BLD AUTO: 0.02 K/UL (ref 0–0.51)
EOSINOPHIL NFR BLD: 0.2 % (ref 0–6.9)
EPI CELLS #/AREA URNS HPF: ABNORMAL /HPF
ERYTHROCYTE [DISTWIDTH] IN BLOOD BY AUTOMATED COUNT: 51.1 FL (ref 35.9–50)
GFR SERPLBLD CREATININE-BSD FMLA CKD-EPI: 109 ML/MIN/1.73 M 2
GLOBULIN SER CALC-MCNC: 3.9 G/DL (ref 1.9–3.5)
GLUCOSE SERPL-MCNC: 126 MG/DL (ref 65–99)
GLUCOSE UR STRIP.AUTO-MCNC: NEGATIVE MG/DL
HCG SERPL QL: NEGATIVE
HCT VFR BLD AUTO: 34.3 % (ref 37–47)
HGB BLD-MCNC: 11.4 G/DL (ref 12–16)
HYALINE CASTS #/AREA URNS LPF: ABNORMAL /LPF
IMM GRANULOCYTES # BLD AUTO: 0.04 K/UL (ref 0–0.11)
IMM GRANULOCYTES NFR BLD AUTO: 0.5 % (ref 0–0.9)
KETONES UR STRIP.AUTO-MCNC: NEGATIVE MG/DL
LEUKOCYTE ESTERASE UR QL STRIP.AUTO: NEGATIVE
LIPASE SERPL-CCNC: 36 U/L (ref 11–82)
LYMPHOCYTES # BLD AUTO: 0.67 K/UL (ref 1–4.8)
LYMPHOCYTES NFR BLD: 8.2 % (ref 22–41)
MAGNESIUM SERPL-MCNC: 1.9 MG/DL (ref 1.5–2.5)
MAGNESIUM SERPL-MCNC: 2.1 MG/DL (ref 1.5–2.5)
MCH RBC QN AUTO: 31.4 PG (ref 27–33)
MCHC RBC AUTO-ENTMCNC: 33.2 G/DL (ref 32.2–35.5)
MCV RBC AUTO: 94.5 FL (ref 81.4–97.8)
MICRO URNS: ABNORMAL
MONOCYTES # BLD AUTO: 0.26 K/UL (ref 0–0.85)
MONOCYTES NFR BLD AUTO: 3.2 % (ref 0–13.4)
NEUTROPHILS # BLD AUTO: 7.13 K/UL (ref 1.82–7.42)
NEUTROPHILS NFR BLD: 87.4 % (ref 44–72)
NITRITE UR QL STRIP.AUTO: NEGATIVE
NRBC # BLD AUTO: 0 K/UL
NRBC BLD-RTO: 0 /100 WBC (ref 0–0.2)
PH UR STRIP.AUTO: 6.5 [PH] (ref 5–8)
PLATELET # BLD AUTO: 549 K/UL (ref 164–446)
PMV BLD AUTO: 9.4 FL (ref 9–12.9)
POTASSIUM SERPL-SCNC: 3.5 MMOL/L (ref 3.6–5.5)
PROT SERPL-MCNC: 7.4 G/DL (ref 6–8.2)
PROT UR QL STRIP: 30 MG/DL
RBC # BLD AUTO: 3.63 M/UL (ref 4.2–5.4)
RBC # URNS HPF: ABNORMAL /HPF
RBC UR QL AUTO: NEGATIVE
SODIUM SERPL-SCNC: 137 MMOL/L (ref 135–145)
SP GR UR STRIP.AUTO: 1.01
UROBILINOGEN UR STRIP.AUTO-MCNC: 0.2 MG/DL
WBC # BLD AUTO: 8.2 K/UL (ref 4.8–10.8)
WBC #/AREA URNS HPF: ABNORMAL /HPF

## 2024-02-07 PROCEDURE — A9270 NON-COVERED ITEM OR SERVICE: HCPCS | Performed by: EMERGENCY MEDICINE

## 2024-02-07 PROCEDURE — 84703 CHORIONIC GONADOTROPIN ASSAY: CPT

## 2024-02-07 PROCEDURE — 700111 HCHG RX REV CODE 636 W/ 250 OVERRIDE (IP): Mod: JZ

## 2024-02-07 PROCEDURE — 96375 TX/PRO/DX INJ NEW DRUG ADDON: CPT

## 2024-02-07 PROCEDURE — 36415 COLL VENOUS BLD VENIPUNCTURE: CPT

## 2024-02-07 PROCEDURE — 81001 URINALYSIS AUTO W/SCOPE: CPT

## 2024-02-07 PROCEDURE — 700102 HCHG RX REV CODE 250 W/ 637 OVERRIDE(OP): Performed by: EMERGENCY MEDICINE

## 2024-02-07 PROCEDURE — 96374 THER/PROPH/DIAG INJ IV PUSH: CPT

## 2024-02-07 PROCEDURE — 85025 COMPLETE CBC W/AUTO DIFF WBC: CPT

## 2024-02-07 PROCEDURE — 83735 ASSAY OF MAGNESIUM: CPT

## 2024-02-07 PROCEDURE — 99223 1ST HOSP IP/OBS HIGH 75: CPT | Mod: GC | Performed by: FAMILY MEDICINE

## 2024-02-07 PROCEDURE — A9270 NON-COVERED ITEM OR SERVICE: HCPCS

## 2024-02-07 PROCEDURE — 770006 HCHG ROOM/CARE - MED/SURG/GYN SEMI*

## 2024-02-07 PROCEDURE — 700102 HCHG RX REV CODE 250 W/ 637 OVERRIDE(OP)

## 2024-02-07 PROCEDURE — 700105 HCHG RX REV CODE 258: Performed by: EMERGENCY MEDICINE

## 2024-02-07 PROCEDURE — 83690 ASSAY OF LIPASE: CPT

## 2024-02-07 PROCEDURE — 700111 HCHG RX REV CODE 636 W/ 250 OVERRIDE (IP): Mod: JZ | Performed by: EMERGENCY MEDICINE

## 2024-02-07 PROCEDURE — 80053 COMPREHEN METABOLIC PANEL: CPT

## 2024-02-07 PROCEDURE — 99285 EMERGENCY DEPT VISIT HI MDM: CPT

## 2024-02-07 PROCEDURE — 700111 HCHG RX REV CODE 636 W/ 250 OVERRIDE (IP)

## 2024-02-07 PROCEDURE — 76705 ECHO EXAM OF ABDOMEN: CPT

## 2024-02-07 PROCEDURE — 74018 RADEX ABDOMEN 1 VIEW: CPT

## 2024-02-07 PROCEDURE — 700105 HCHG RX REV CODE 258

## 2024-02-07 RX ORDER — POTASSIUM CHLORIDE 20 MEQ/1
40 TABLET, EXTENDED RELEASE ORAL ONCE
Status: COMPLETED | OUTPATIENT
Start: 2024-02-07 | End: 2024-02-07

## 2024-02-07 RX ORDER — ACETAMINOPHEN 325 MG/1
650 TABLET ORAL EVERY 6 HOURS PRN
Status: DISCONTINUED | OUTPATIENT
Start: 2024-02-07 | End: 2024-02-09 | Stop reason: HOSPADM

## 2024-02-07 RX ORDER — ONDANSETRON 4 MG/1
4 TABLET, ORALLY DISINTEGRATING ORAL EVERY 4 HOURS PRN
Status: DISCONTINUED | OUTPATIENT
Start: 2024-02-07 | End: 2024-02-09 | Stop reason: HOSPADM

## 2024-02-07 RX ORDER — QUETIAPINE FUMARATE 100 MG/1
100 TABLET, FILM COATED ORAL NIGHTLY
Status: DISCONTINUED | OUTPATIENT
Start: 2024-02-07 | End: 2024-02-09 | Stop reason: HOSPADM

## 2024-02-07 RX ORDER — OMEPRAZOLE 40 MG/1
40 CAPSULE, DELAYED RELEASE ORAL 2 TIMES DAILY
COMMUNITY
Start: 2024-01-04

## 2024-02-07 RX ORDER — POLYETHYLENE GLYCOL 3350 17 G/17G
1 POWDER, FOR SOLUTION ORAL
Status: DISCONTINUED | OUTPATIENT
Start: 2024-02-07 | End: 2024-02-09 | Stop reason: HOSPADM

## 2024-02-07 RX ORDER — AMOXICILLIN 250 MG
2 CAPSULE ORAL EVERY EVENING
Status: DISCONTINUED | OUTPATIENT
Start: 2024-02-07 | End: 2024-02-09 | Stop reason: HOSPADM

## 2024-02-07 RX ORDER — OXYCODONE HYDROCHLORIDE 5 MG/1
2.5 TABLET ORAL
Status: DISCONTINUED | OUTPATIENT
Start: 2024-02-07 | End: 2024-02-07

## 2024-02-07 RX ORDER — ONDANSETRON 2 MG/ML
4 INJECTION INTRAMUSCULAR; INTRAVENOUS EVERY 4 HOURS PRN
Status: DISCONTINUED | OUTPATIENT
Start: 2024-02-07 | End: 2024-02-09 | Stop reason: HOSPADM

## 2024-02-07 RX ORDER — MORPHINE SULFATE 4 MG/ML
4 INJECTION INTRAVENOUS
Status: COMPLETED | OUTPATIENT
Start: 2024-02-07 | End: 2024-02-07

## 2024-02-07 RX ORDER — HYDROMORPHONE HYDROCHLORIDE 1 MG/ML
0.25 INJECTION, SOLUTION INTRAMUSCULAR; INTRAVENOUS; SUBCUTANEOUS
Status: DISCONTINUED | OUTPATIENT
Start: 2024-02-07 | End: 2024-02-09 | Stop reason: HOSPADM

## 2024-02-07 RX ORDER — OXYCODONE HYDROCHLORIDE 5 MG/1
5 TABLET ORAL
Status: DISCONTINUED | OUTPATIENT
Start: 2024-02-07 | End: 2024-02-07

## 2024-02-07 RX ORDER — PROCHLORPERAZINE EDISYLATE 5 MG/ML
5-10 INJECTION INTRAMUSCULAR; INTRAVENOUS EVERY 4 HOURS PRN
Status: DISCONTINUED | OUTPATIENT
Start: 2024-02-07 | End: 2024-02-09 | Stop reason: HOSPADM

## 2024-02-07 RX ORDER — ENOXAPARIN SODIUM 100 MG/ML
40 INJECTION SUBCUTANEOUS DAILY
Status: DISCONTINUED | OUTPATIENT
Start: 2024-02-07 | End: 2024-02-08

## 2024-02-07 RX ORDER — PROMETHAZINE HYDROCHLORIDE 25 MG/1
12.5-25 TABLET ORAL EVERY 4 HOURS PRN
Status: DISCONTINUED | OUTPATIENT
Start: 2024-02-07 | End: 2024-02-09 | Stop reason: HOSPADM

## 2024-02-07 RX ORDER — SODIUM CHLORIDE, SODIUM LACTATE, POTASSIUM CHLORIDE, CALCIUM CHLORIDE 600; 310; 30; 20 MG/100ML; MG/100ML; MG/100ML; MG/100ML
INJECTION, SOLUTION INTRAVENOUS CONTINUOUS
Status: DISCONTINUED | OUTPATIENT
Start: 2024-02-07 | End: 2024-02-09 | Stop reason: HOSPADM

## 2024-02-07 RX ORDER — OMEPRAZOLE 20 MG/1
40 CAPSULE, DELAYED RELEASE ORAL DAILY
Status: DISCONTINUED | OUTPATIENT
Start: 2024-02-07 | End: 2024-02-08

## 2024-02-07 RX ORDER — PROMETHAZINE HYDROCHLORIDE 25 MG/1
12.5-25 SUPPOSITORY RECTAL EVERY 4 HOURS PRN
Status: DISCONTINUED | OUTPATIENT
Start: 2024-02-07 | End: 2024-02-09 | Stop reason: HOSPADM

## 2024-02-07 RX ORDER — DIPHENHYDRAMINE HYDROCHLORIDE 50 MG/ML
25 INJECTION INTRAMUSCULAR; INTRAVENOUS ONCE
Status: COMPLETED | OUTPATIENT
Start: 2024-02-07 | End: 2024-02-07

## 2024-02-07 RX ORDER — LABETALOL HYDROCHLORIDE 5 MG/ML
10 INJECTION, SOLUTION INTRAVENOUS EVERY 4 HOURS PRN
Status: DISCONTINUED | OUTPATIENT
Start: 2024-02-07 | End: 2024-02-09 | Stop reason: HOSPADM

## 2024-02-07 RX ORDER — METOCLOPRAMIDE HYDROCHLORIDE 5 MG/ML
10 INJECTION INTRAMUSCULAR; INTRAVENOUS ONCE
Status: COMPLETED | OUTPATIENT
Start: 2024-02-07 | End: 2024-02-07

## 2024-02-07 RX ORDER — SODIUM CHLORIDE, SODIUM LACTATE, POTASSIUM CHLORIDE, CALCIUM CHLORIDE 600; 310; 30; 20 MG/100ML; MG/100ML; MG/100ML; MG/100ML
1000 INJECTION, SOLUTION INTRAVENOUS ONCE
Status: COMPLETED | OUTPATIENT
Start: 2024-02-07 | End: 2024-02-07

## 2024-02-07 RX ORDER — OXYCODONE HYDROCHLORIDE 10 MG/1
10 TABLET ORAL
Status: DISCONTINUED | OUTPATIENT
Start: 2024-02-07 | End: 2024-02-09 | Stop reason: HOSPADM

## 2024-02-07 RX ORDER — HYDROMORPHONE HYDROCHLORIDE 1 MG/ML
0.25 INJECTION, SOLUTION INTRAMUSCULAR; INTRAVENOUS; SUBCUTANEOUS
Status: DISCONTINUED | OUTPATIENT
Start: 2024-02-07 | End: 2024-02-07

## 2024-02-07 RX ORDER — PAROXETINE 10 MG/1
30 TABLET, FILM COATED ORAL DAILY
Status: DISCONTINUED | OUTPATIENT
Start: 2024-02-07 | End: 2024-02-09 | Stop reason: HOSPADM

## 2024-02-07 RX ADMIN — OMEPRAZOLE 40 MG: 20 CAPSULE, DELAYED RELEASE ORAL at 17:42

## 2024-02-07 RX ADMIN — SODIUM CHLORIDE, POTASSIUM CHLORIDE, SODIUM LACTATE AND CALCIUM CHLORIDE: 600; 310; 30; 20 INJECTION, SOLUTION INTRAVENOUS at 17:36

## 2024-02-07 RX ADMIN — HYDROMORPHONE HYDROCHLORIDE 0.25 MG: 1 INJECTION, SOLUTION INTRAMUSCULAR; INTRAVENOUS; SUBCUTANEOUS at 18:40

## 2024-02-07 RX ADMIN — POTASSIUM CHLORIDE 40 MEQ: 1500 TABLET, EXTENDED RELEASE ORAL at 17:42

## 2024-02-07 RX ADMIN — SODIUM CHLORIDE, POTASSIUM CHLORIDE, SODIUM LACTATE AND CALCIUM CHLORIDE 1000 ML: 600; 310; 30; 20 INJECTION, SOLUTION INTRAVENOUS at 12:08

## 2024-02-07 RX ADMIN — OXYCODONE HYDROCHLORIDE 10 MG: 10 TABLET ORAL at 22:31

## 2024-02-07 RX ADMIN — ENOXAPARIN SODIUM 40 MG: 100 INJECTION SUBCUTANEOUS at 17:41

## 2024-02-07 RX ADMIN — MORPHINE SULFATE 4 MG: 4 INJECTION, SOLUTION INTRAMUSCULAR; INTRAVENOUS at 12:07

## 2024-02-07 RX ADMIN — DIPHENHYDRAMINE HYDROCHLORIDE 25 MG: 50 INJECTION, SOLUTION INTRAMUSCULAR; INTRAVENOUS at 15:06

## 2024-02-07 RX ADMIN — QUETIAPINE FUMARATE 100 MG: 100 TABLET ORAL at 20:34

## 2024-02-07 RX ADMIN — OXYCODONE 5 MG: 5 TABLET ORAL at 17:49

## 2024-02-07 RX ADMIN — METOCLOPRAMIDE 10 MG: 5 INJECTION, SOLUTION INTRAMUSCULAR; INTRAVENOUS at 15:03

## 2024-02-07 RX ADMIN — SODIUM CHLORIDE, POTASSIUM CHLORIDE, SODIUM LACTATE AND CALCIUM CHLORIDE 1000 ML: 600; 310; 30; 20 INJECTION, SOLUTION INTRAVENOUS at 15:07

## 2024-02-07 RX ADMIN — LIDOCAINE HYDROCHLORIDE 30 ML: 20 SOLUTION ORAL; TOPICAL at 12:11

## 2024-02-07 RX ADMIN — FENTANYL CITRATE 50 MCG: 50 INJECTION, SOLUTION INTRAMUSCULAR; INTRAVENOUS at 13:19

## 2024-02-07 ASSESSMENT — COGNITIVE AND FUNCTIONAL STATUS - GENERAL
DAILY ACTIVITIY SCORE: 20
STANDING UP FROM CHAIR USING ARMS: A LITTLE
CLIMB 3 TO 5 STEPS WITH RAILING: A LITTLE
SUGGESTED CMS G CODE MODIFIER MOBILITY: CK
WALKING IN HOSPITAL ROOM: A LITTLE
DRESSING REGULAR LOWER BODY CLOTHING: A LITTLE
TOILETING: A LITTLE
HELP NEEDED FOR BATHING: A LITTLE
MOVING FROM LYING ON BACK TO SITTING ON SIDE OF FLAT BED: A LITTLE
SUGGESTED CMS G CODE MODIFIER DAILY ACTIVITY: CJ
MOBILITY SCORE: 19
MOVING TO AND FROM BED TO CHAIR: A LITTLE
DRESSING REGULAR UPPER BODY CLOTHING: A LITTLE

## 2024-02-07 ASSESSMENT — LIFESTYLE VARIABLES
ALCOHOL_USE: NO
EVER HAD A DRINK FIRST THING IN THE MORNING TO STEADY YOUR NERVES TO GET RID OF A HANGOVER: NO
ON A TYPICAL DAY WHEN YOU DRINK ALCOHOL HOW MANY DRINKS DO YOU HAVE: 0
TOTAL SCORE: 0
TOTAL SCORE: 0
DOES PATIENT WANT TO STOP DRINKING: NO
AVERAGE NUMBER OF DAYS PER WEEK YOU HAVE A DRINK CONTAINING ALCOHOL: 0
CONSUMPTION TOTAL: NEGATIVE
EVER FELT BAD OR GUILTY ABOUT YOUR DRINKING: NO
HAVE PEOPLE ANNOYED YOU BY CRITICIZING YOUR DRINKING: NO
DO YOU DRINK ALCOHOL: NO
HOW MANY TIMES IN THE PAST YEAR HAVE YOU HAD 5 OR MORE DRINKS IN A DAY: 0
HAVE YOU EVER FELT YOU SHOULD CUT DOWN ON YOUR DRINKING: NO
TOTAL SCORE: 0

## 2024-02-07 ASSESSMENT — FIBROSIS 4 INDEX
FIB4 SCORE: 0.26
FIB4 SCORE: 0.49

## 2024-02-07 ASSESSMENT — PAIN DESCRIPTION - PAIN TYPE
TYPE: ACUTE PAIN

## 2024-02-07 NOTE — ED PROVIDER NOTES
ED Provider Note    CHIEF COMPLAINT  Chief Complaint   Patient presents with    Abdominal Pain    N/V       EXTERNAL RECORDS REVIEWED  Inpatient Notes Most recently admitted to this facility in 6/2023 for abdominal pain refractory to outpatient oxycodone and tylenol. She was given an aggressive bowel regimen, had multiple bowel movements with improvement in her symptoms and discharged to follow up as an outpatient.    HPI/ROS  LIMITATION TO HISTORY   Select: : None  OUTSIDE HISTORIAN(S):  None    Mary Martinez is a 53 y.o. female with a history of SMA syndrome who presents with a chief complaint of epigastric abdominal pain that started 2 days ago with associated nausea and vomiting.  She states that she has been unable to keep any food or liquids down.  She tried omeprazole and Carafate without improvement.  She also trialed Zofran but continued to vomit.  Her symptoms are consistent with her SMA syndrome.  Her son called GI consultants today and she was unable to get an appointment until 3 weeks from now.  She has not had any fevers, chest pain, shortness of breath, diarrhea, dysuria.  She has not had any hematemesis, melena, or hematochezia.  EMS was contacted and gave her dose of Phenergan which improved her nausea.  She reports the pain has persisted.    PAST MEDICAL HISTORY   has a past medical history of Acute renal failure (ARF) ([facility]), Acute renal failure (HCC) (10/06/2011), Allergy, Anemia, Anxiety, Arthritis, ASTHMA, Blood transfusion without reported diagnosis, Bowel habit changes, Bronchitis (last approx 2018), Chronic pain (04/24/2020), Dental disorder, Depression, GERD (gastroesophageal reflux disease), GIB (gastrointestinal bleeding), Head ache, Heart burn, Hiatal hernia, Hiatus hernia syndrome, History of pancreatitis, Hypokalemia, Hyponatremia, Idiopathic chronic pancreatitis (HCC), Indigestion, Intractable nausea and vomiting, Kidney disease, Leukocytosis (10/08/2011), Pain, Pneumonia  (10/2019), PONV (postoperative nausea and vomiting), Psychiatric problem, Rheumatoid aortitis, Rheumatoid arthritis(714.0) (2003), Severe protein-calorie malnutrition (HCC), Small bowel obstruction (HCC) (10/06/2011), SMAS (superior mesenteric artery syndrome) (HCC), Substance abuse (HCC), UTI (urinary tract infection) (05/25/2023), and Vitamin B12 deficiency neuropathy (HCC).    SURGICAL HISTORY   has a past surgical history that includes abdominal abscess drainage (9/27/2011); toe fusion (Right, 5/27/2015); bone spur excision (5/27/2015); hammertoe correction (5/27/2015); foot reconstruction rheumatic (Left, 7/29/2015); arthrodesis (Right, 5/6/2016); fusion, pip joint, toe (Right, 8/29/2016); bone graft (Right, 8/29/2016); irrigation & debridement ortho (Right, 9/11/2016); finger amputation (Right, 9/14/2016); finger arthroplasty (Right, 4/17/2017); finger arthroplasty (Right, 6/5/2017); finger amputation (6/5/2017); exploratory of abdomen (N/A, 10/4/2019); tonsillectomy (1982); primary c section (1991); repeat c section (1999); repeat c section (2005); repeat c section (2008); appendectomy (2011); nicholas by laparoscopy (9/27/2011); wrist exploration (Left, 1980's); colonoscopy (2018); dental extraction(s) (2014); endoscopic us exam, esoph (4/29/2020); gastroscopy-endo (4/29/2020); egd with asp/bx (4/29/2020); upper gi endoscopy,diagnosis (N/A, 9/9/2022); egd w/endoscopic ultrasound (N/A, 9/9/2022); place percut gastrostomy tube (N/A, 6/12/2023); upper gi endoscopy,diagnosis (6/12/2023); and upper gi endoscopy,diagnosis (N/A, 8/16/2023).    FAMILY HISTORY  Family History   Problem Relation Age of Onset    Cancer Mother     Heart Disease Mother     Hypertension Mother     Heart Disease Father     Hypertension Father        SOCIAL HISTORY  Social History     Tobacco Use    Smoking status: Former     Current packs/day: 0.00     Average packs/day: 0.5 packs/day for 30.0 years (15.0 ttl pk-yrs)     Types: Cigarettes      "Quit date: 2023     Years since quittin.1     Passive exposure: Never    Smokeless tobacco: Never   Vaping Use    Vaping Use: Never used   Substance and Sexual Activity    Alcohol use: Never    Drug use: Never    Sexual activity: Not on file       CURRENT MEDICATIONS  Home Medications    **Home medications have not yet been reviewed for this encounter**         ALLERGIES  Allergies   Allergen Reactions    Penicillins Anaphylaxis and Hives     Tolerates cephalosporins; reports throat swelling with PCN    Aripiprazole Nausea     Spasms, shaking      Nitrous Oxide Vomiting       PHYSICAL EXAM  VITAL SIGNS: /82   Pulse 72   Temp 36.1 °C (97 °F) (Temporal)   Resp 12   Ht 1.6 m (5' 3\")   Wt 39.9 kg (88 lb)   LMP 2011   SpO2 97%   BMI 15.59 kg/m²    Physical Exam  Vitals and nursing note reviewed.   Constitutional:       Comments: Cachectic   HENT:      Head: Normocephalic and atraumatic.      Mouth/Throat:      Pharynx: Oropharynx is clear.      Comments: Dry mucous membranes.  Eyes:      Extraocular Movements: Extraocular movements intact.      Pupils: Pupils are equal, round, and reactive to light.   Cardiovascular:      Rate and Rhythm: Normal rate and regular rhythm.      Heart sounds: Normal heart sounds.   Pulmonary:      Effort: Pulmonary effort is normal.      Breath sounds: Normal breath sounds.   Abdominal:      General: Abdomen is flat and scaphoid.      Hernia: No hernia is present.      Comments: Tender to palpation in right upper quadrant and epigastric region without peritoneal signs.   Skin:     General: Skin is warm and dry.   Neurological:      General: No focal deficit present.      Mental Status: She is alert and oriented to person, place, and time.   Psychiatric:         Mood and Affect: Mood normal.         Behavior: Behavior normal.       DIAGNOSTIC STUDIES / PROCEDURES  LABS  Results for orders placed or performed during the hospital encounter of 24   CBC WITH " DIFFERENTIAL   Result Value Ref Range    WBC 8.2 4.8 - 10.8 K/uL    RBC 3.63 (L) 4.20 - 5.40 M/uL    Hemoglobin 11.4 (L) 12.0 - 16.0 g/dL    Hematocrit 34.3 (L) 37.0 - 47.0 %    MCV 94.5 81.4 - 97.8 fL    MCH 31.4 27.0 - 33.0 pg    MCHC 33.2 32.2 - 35.5 g/dL    RDW 51.1 (H) 35.9 - 50.0 fL    Platelet Count 549 (H) 164 - 446 K/uL    MPV 9.4 9.0 - 12.9 fL    Neutrophils-Polys 87.40 (H) 44.00 - 72.00 %    Lymphocytes 8.20 (L) 22.00 - 41.00 %    Monocytes 3.20 0.00 - 13.40 %    Eosinophils 0.20 0.00 - 6.90 %    Basophils 0.50 0.00 - 1.80 %    Immature Granulocytes 0.50 0.00 - 0.90 %    Nucleated RBC 0.00 0.00 - 0.20 /100 WBC    Neutrophils (Absolute) 7.13 1.82 - 7.42 K/uL    Lymphs (Absolute) 0.67 (L) 1.00 - 4.80 K/uL    Monos (Absolute) 0.26 0.00 - 0.85 K/uL    Eos (Absolute) 0.02 0.00 - 0.51 K/uL    Baso (Absolute) 0.04 0.00 - 0.12 K/uL    Immature Granulocytes (abs) 0.04 0.00 - 0.11 K/uL    NRBC (Absolute) 0.00 K/uL   COMP METABOLIC PANEL   Result Value Ref Range    Sodium 137 135 - 145 mmol/L    Potassium 3.5 (L) 3.6 - 5.5 mmol/L    Chloride 112 96 - 112 mmol/L    Co2 11 (L) 20 - 33 mmol/L    Anion Gap 14.0 7.0 - 16.0    Glucose 126 (H) 65 - 99 mg/dL    Bun 9 8 - 22 mg/dL    Creatinine 0.58 0.50 - 1.40 mg/dL    Calcium 8.6 8.5 - 10.5 mg/dL    Correct Calcium 9.0 8.5 - 10.5 mg/dL    AST(SGOT) 10 (L) 12 - 45 U/L    ALT(SGPT) 13 2 - 50 U/L    Alkaline Phosphatase 230 (H) 30 - 99 U/L    Total Bilirubin 0.2 0.1 - 1.5 mg/dL    Albumin 3.5 3.2 - 4.9 g/dL    Total Protein 7.4 6.0 - 8.2 g/dL    Globulin 3.9 (H) 1.9 - 3.5 g/dL    A-G Ratio 0.9 g/dL   LIPASE   Result Value Ref Range    Lipase 36 11 - 82 U/L   HCG QUAL SERUM   Result Value Ref Range    Beta-Hcg Qualitative Serum Negative Negative   URINALYSIS,CULTURE IF INDICATED    Specimen: Urine   Result Value Ref Range    Color Yellow     Character Clear     Specific Gravity 1.015 <1.035    Ph 6.5 5.0 - 8.0    Glucose Negative Negative mg/dL    Ketones Negative Negative  mg/dL    Protein 30 (A) Negative mg/dL    Bilirubin Negative Negative    Urobilinogen, Urine 0.2 Negative    Nitrite Negative Negative    Leukocyte Esterase Negative Negative    Occult Blood Negative Negative    Micro Urine Req Microscopic    ESTIMATED GFR   Result Value Ref Range    GFR (CKD-EPI) 109 >60 mL/min/1.73 m 2   Magnesium   Result Value Ref Range    Magnesium 2.1 1.5 - 2.5 mg/dL   URINE MICROSCOPIC (W/UA)   Result Value Ref Range    WBC 5-10 (A) /hpf    RBC 0-2 /hpf    Bacteria Negative None /hpf    Epithelial Cells Few /hpf    Hyaline Cast 0-2 /lpf   Magnesium   Result Value Ref Range    Magnesium 1.9 1.5 - 2.5 mg/dL   CBC without Differential   Result Value Ref Range    WBC 6.9 4.8 - 10.8 K/uL    RBC 3.24 (L) 4.20 - 5.40 M/uL    Hemoglobin 10.3 (L) 12.0 - 16.0 g/dL    Hematocrit 30.9 (L) 37.0 - 47.0 %    MCV 95.4 81.4 - 97.8 fL    MCH 31.8 27.0 - 33.0 pg    MCHC 33.3 32.2 - 35.5 g/dL    RDW 51.8 (H) 35.9 - 50.0 fL    Platelet Count 461 (H) 164 - 446 K/uL    MPV 9.1 9.0 - 12.9 fL   Comp Metabolic Panel (CMP)   Result Value Ref Range    Sodium 137 135 - 145 mmol/L    Potassium 3.5 (L) 3.6 - 5.5 mmol/L    Chloride 111 96 - 112 mmol/L    Co2 13 (L) 20 - 33 mmol/L    Anion Gap 13.0 7.0 - 16.0    Glucose 85 65 - 99 mg/dL    Bun 8 8 - 22 mg/dL    Creatinine 0.65 0.50 - 1.40 mg/dL    Calcium 8.7 8.5 - 10.5 mg/dL    Correct Calcium 9.3 8.5 - 10.5 mg/dL    AST(SGOT) 7 (L) 12 - 45 U/L    ALT(SGPT) 8 2 - 50 U/L    Alkaline Phosphatase 205 (H) 30 - 99 U/L    Total Bilirubin 0.2 0.1 - 1.5 mg/dL    Albumin 3.2 3.2 - 4.9 g/dL    Total Protein 6.8 6.0 - 8.2 g/dL    Globulin 3.6 (H) 1.9 - 3.5 g/dL    A-G Ratio 0.9 g/dL   ESTIMATED GFR   Result Value Ref Range    GFR (CKD-EPI) 106 >60 mL/min/1.73 m 2     RADIOLOGY  Radiologist interpretation:   [id number] VIEW   Final Result         No specific finding to suggest small bowel obstruction.      US-RUQ   Final Result      1.  Prior cholecystectomy.   2.  Intrahepatic and  extra hepatic biliary dilation as well as pancreatic duct dilation as seen on prior CT, concerning for distal stricture, stone or mass.   3.  Nonobstructing RIGHT kidney stones measuring 4 mm.        COURSE & MEDICAL DECISION MAKING    ED Observation Status? No; Patient does not meet criteria for ED Observation.     INITIAL ASSESSMENT, COURSE AND PLAN  Care Narrative: This is a donovan 53 year old female with history of SMA syndrome who is here with epigastric and RUQ pain and tenderness and associated nausea and vomiting with inability to keep down any food or fluids for 2-3 days.    She appears dehydrated and chronically ill but non-toxic. Abdomen is soft with tenderness in the epigastric and right upper quadrant but she is not peritoneal. Her VS are normal and stable.     Started on IV fluids and given pain and nausea medication.    CBC without leukocytosis although there is a neutrophilic predominance without bandemia. No significant electrolyte derangement. Glucose slightly elevated to 126 with a CO2 of 11. She has normal renal and liver function. Lipase is normal.     Given her recent CT scan that demonstrated worsening biliary duct dilation and focal RUQ tenderness a RUQ ultrasound was obtained for closer evaluation. This was concerning for distal stricture, stone or mass. This is of unclear significance without transaminitis or leukocytosis. Regardless, patient required multiple doses of pain and nausea medication and will require hospitalization for additional workup and management. Discussed with the FM resident and admitted in guarded condition.    HYDRATION: Based on the patient's presentation of Acute Vomiting and Dehydration the patient was given IV fluids. IV Hydration was used because oral hydration was not adequate alone. Upon recheck following hydration, the patient was unchanged.    ADDITIONAL PROBLEM LIST  None  DISPOSITION AND DISCUSSIONS  I have discussed management of the patient with the  following physicians and HALIMA's:  Dr. Arenas,  resident.    Discussion of management with other QHP or appropriate source(s): None     Escalation of care considered, and ultimately not performed: N/A.    Barriers to care at this time, including but not limited to:  None .     Decision tools and prescription drugs considered including, but not limited to: N/A.    FINAL DIAGNOSIS  Nausea and vomiting  Abdominal pain  Dilated intra and extrahepatic biliary ducts     Electronically signed by: Julio Becerril M.D., 2/7/2024 11:40 AM

## 2024-02-07 NOTE — ED NOTES
Chief Complaint   Patient presents with    Abdominal Pain    N/V     Pt bib ems from home with above complaints. Pt took zofran at home but no relief.  She was given phenergan 6.25 mg and 200cc ivf pta.

## 2024-02-08 LAB
ALBUMIN SERPL BCP-MCNC: 3.2 G/DL (ref 3.2–4.9)
ALBUMIN/GLOB SERPL: 0.9 G/DL
ALP SERPL-CCNC: 205 U/L (ref 30–99)
ALT SERPL-CCNC: 8 U/L (ref 2–50)
ANION GAP SERPL CALC-SCNC: 13 MMOL/L (ref 7–16)
AST SERPL-CCNC: 7 U/L (ref 12–45)
BILIRUB SERPL-MCNC: 0.2 MG/DL (ref 0.1–1.5)
BUN SERPL-MCNC: 8 MG/DL (ref 8–22)
CALCIUM ALBUM COR SERPL-MCNC: 9.3 MG/DL (ref 8.5–10.5)
CALCIUM SERPL-MCNC: 8.7 MG/DL (ref 8.5–10.5)
CHLORIDE SERPL-SCNC: 111 MMOL/L (ref 96–112)
CO2 SERPL-SCNC: 13 MMOL/L (ref 20–33)
CREAT SERPL-MCNC: 0.65 MG/DL (ref 0.5–1.4)
ERYTHROCYTE [DISTWIDTH] IN BLOOD BY AUTOMATED COUNT: 51.8 FL (ref 35.9–50)
GFR SERPLBLD CREATININE-BSD FMLA CKD-EPI: 106 ML/MIN/1.73 M 2
GLOBULIN SER CALC-MCNC: 3.6 G/DL (ref 1.9–3.5)
GLUCOSE SERPL-MCNC: 85 MG/DL (ref 65–99)
HCT VFR BLD AUTO: 30.9 % (ref 37–47)
HGB BLD-MCNC: 10.3 G/DL (ref 12–16)
MCH RBC QN AUTO: 31.8 PG (ref 27–33)
MCHC RBC AUTO-ENTMCNC: 33.3 G/DL (ref 32.2–35.5)
MCV RBC AUTO: 95.4 FL (ref 81.4–97.8)
PLATELET # BLD AUTO: 461 K/UL (ref 164–446)
PMV BLD AUTO: 9.1 FL (ref 9–12.9)
POTASSIUM SERPL-SCNC: 3.5 MMOL/L (ref 3.6–5.5)
PROT SERPL-MCNC: 6.8 G/DL (ref 6–8.2)
RBC # BLD AUTO: 3.24 M/UL (ref 4.2–5.4)
SODIUM SERPL-SCNC: 137 MMOL/L (ref 135–145)
WBC # BLD AUTO: 6.9 K/UL (ref 4.8–10.8)

## 2024-02-08 PROCEDURE — 99221 1ST HOSP IP/OBS SF/LOW 40: CPT | Performed by: INTERNAL MEDICINE

## 2024-02-08 PROCEDURE — 97166 OT EVAL MOD COMPLEX 45 MIN: CPT

## 2024-02-08 PROCEDURE — 700102 HCHG RX REV CODE 250 W/ 637 OVERRIDE(OP)

## 2024-02-08 PROCEDURE — A9270 NON-COVERED ITEM OR SERVICE: HCPCS

## 2024-02-08 PROCEDURE — 700111 HCHG RX REV CODE 636 W/ 250 OVERRIDE (IP): Performed by: FAMILY MEDICINE

## 2024-02-08 PROCEDURE — 80053 COMPREHEN METABOLIC PANEL: CPT

## 2024-02-08 PROCEDURE — 36415 COLL VENOUS BLD VENIPUNCTURE: CPT

## 2024-02-08 PROCEDURE — 85027 COMPLETE CBC AUTOMATED: CPT

## 2024-02-08 PROCEDURE — 97162 PT EVAL MOD COMPLEX 30 MIN: CPT

## 2024-02-08 PROCEDURE — 770006 HCHG ROOM/CARE - MED/SURG/GYN SEMI*

## 2024-02-08 PROCEDURE — 99232 SBSQ HOSP IP/OBS MODERATE 35: CPT | Mod: GC | Performed by: FAMILY MEDICINE

## 2024-02-08 PROCEDURE — 700111 HCHG RX REV CODE 636 W/ 250 OVERRIDE (IP)

## 2024-02-08 PROCEDURE — 92610 EVALUATE SWALLOWING FUNCTION: CPT

## 2024-02-08 RX ORDER — POTASSIUM CHLORIDE 7.45 MG/ML
10 INJECTION INTRAVENOUS ONCE
Status: COMPLETED | OUTPATIENT
Start: 2024-02-08 | End: 2024-02-08

## 2024-02-08 RX ORDER — ENOXAPARIN SODIUM 100 MG/ML
30 INJECTION SUBCUTANEOUS DAILY
Status: DISCONTINUED | OUTPATIENT
Start: 2024-02-08 | End: 2024-02-09 | Stop reason: HOSPADM

## 2024-02-08 RX ORDER — POTASSIUM CHLORIDE 7.45 MG/ML
10 INJECTION INTRAVENOUS
Status: DISPENSED | OUTPATIENT
Start: 2024-02-08 | End: 2024-02-08

## 2024-02-08 RX ORDER — OMEPRAZOLE 20 MG/1
40 CAPSULE, DELAYED RELEASE ORAL 2 TIMES DAILY
Status: DISCONTINUED | OUTPATIENT
Start: 2024-02-08 | End: 2024-02-09 | Stop reason: HOSPADM

## 2024-02-08 RX ADMIN — HYDROMORPHONE HYDROCHLORIDE 0.25 MG: 1 INJECTION, SOLUTION INTRAMUSCULAR; INTRAVENOUS; SUBCUTANEOUS at 00:07

## 2024-02-08 RX ADMIN — HYDROMORPHONE HYDROCHLORIDE 0.25 MG: 1 INJECTION, SOLUTION INTRAMUSCULAR; INTRAVENOUS; SUBCUTANEOUS at 08:42

## 2024-02-08 RX ADMIN — OMEPRAZOLE 40 MG: 20 CAPSULE, DELAYED RELEASE ORAL at 05:45

## 2024-02-08 RX ADMIN — OXYCODONE HYDROCHLORIDE 10 MG: 10 TABLET ORAL at 11:31

## 2024-02-08 RX ADMIN — ENOXAPARIN SODIUM 30 MG: 100 INJECTION SUBCUTANEOUS at 17:15

## 2024-02-08 RX ADMIN — OXYCODONE HYDROCHLORIDE 10 MG: 10 TABLET ORAL at 20:48

## 2024-02-08 RX ADMIN — OXYCODONE HYDROCHLORIDE 10 MG: 10 TABLET ORAL at 03:33

## 2024-02-08 RX ADMIN — OXYCODONE HYDROCHLORIDE 10 MG: 10 TABLET ORAL at 07:36

## 2024-02-08 RX ADMIN — HYDROMORPHONE HYDROCHLORIDE 0.25 MG: 1 INJECTION, SOLUTION INTRAMUSCULAR; INTRAVENOUS; SUBCUTANEOUS at 12:46

## 2024-02-08 RX ADMIN — OXYCODONE HYDROCHLORIDE 10 MG: 10 TABLET ORAL at 16:09

## 2024-02-08 RX ADMIN — DOCUSATE SODIUM 50 MG AND SENNOSIDES 8.6 MG 2 TABLET: 8.6; 5 TABLET, FILM COATED ORAL at 17:08

## 2024-02-08 RX ADMIN — OMEPRAZOLE 40 MG: 20 CAPSULE, DELAYED RELEASE ORAL at 17:08

## 2024-02-08 RX ADMIN — POTASSIUM CHLORIDE 10 MEQ: 7.46 INJECTION, SOLUTION INTRAVENOUS at 14:20

## 2024-02-08 RX ADMIN — POTASSIUM CHLORIDE 10 MEQ: 7.46 INJECTION, SOLUTION INTRAVENOUS at 13:05

## 2024-02-08 RX ADMIN — QUETIAPINE FUMARATE 100 MG: 100 TABLET ORAL at 20:47

## 2024-02-08 RX ADMIN — PAROXETINE HYDROCHLORIDE 30 MG: 10 TABLET, FILM COATED ORAL at 05:45

## 2024-02-08 RX ADMIN — HYDROMORPHONE HYDROCHLORIDE 0.25 MG: 1 INJECTION, SOLUTION INTRAMUSCULAR; INTRAVENOUS; SUBCUTANEOUS at 21:58

## 2024-02-08 RX ADMIN — POTASSIUM CHLORIDE 10 MEQ: 7.46 INJECTION, SOLUTION INTRAVENOUS at 08:39

## 2024-02-08 RX ADMIN — HYDROMORPHONE HYDROCHLORIDE 0.25 MG: 1 INJECTION, SOLUTION INTRAMUSCULAR; INTRAVENOUS; SUBCUTANEOUS at 04:34

## 2024-02-08 RX ADMIN — HYDROMORPHONE HYDROCHLORIDE 0.25 MG: 1 INJECTION, SOLUTION INTRAMUSCULAR; INTRAVENOUS; SUBCUTANEOUS at 17:09

## 2024-02-08 ASSESSMENT — PAIN DESCRIPTION - PAIN TYPE
TYPE: ACUTE PAIN

## 2024-02-08 ASSESSMENT — GAIT ASSESSMENTS
DISTANCE (FEET): 10
GAIT LEVEL OF ASSIST: CONTACT GUARD ASSIST
ASSISTIVE DEVICE: FRONT WHEEL WALKER
DISTANCE (FEET): 12

## 2024-02-08 ASSESSMENT — PAIN SCALES - WONG BAKER: WONGBAKER_NUMERICALRESPONSE: HURTS A WHOLE LOT

## 2024-02-08 ASSESSMENT — COGNITIVE AND FUNCTIONAL STATUS - GENERAL
DAILY ACTIVITIY SCORE: 19
STANDING UP FROM CHAIR USING ARMS: TOTAL
WALKING IN HOSPITAL ROOM: A LOT
PERSONAL GROOMING: A LITTLE
DRESSING REGULAR UPPER BODY CLOTHING: A LITTLE
MOVING FROM LYING ON BACK TO SITTING ON SIDE OF FLAT BED: UNABLE
SUGGESTED CMS G CODE MODIFIER MOBILITY: CM
CLIMB 3 TO 5 STEPS WITH RAILING: TOTAL
TURNING FROM BACK TO SIDE WHILE IN FLAT BAD: UNABLE
SUGGESTED CMS G CODE MODIFIER DAILY ACTIVITY: CK
MOVING TO AND FROM BED TO CHAIR: UNABLE
MOBILITY SCORE: 7
TOILETING: A LITTLE
DRESSING REGULAR LOWER BODY CLOTHING: A LITTLE
HELP NEEDED FOR BATHING: A LITTLE

## 2024-02-08 ASSESSMENT — ACTIVITIES OF DAILY LIVING (ADL): TOILETING: INDEPENDENT

## 2024-02-08 NOTE — PROGRESS NOTES
Sent a message to Dr. Arenas for orders. Pt reports 9/10 upper abdominal pain. No PRN medications on board.

## 2024-02-08 NOTE — CARE PLAN
Problem: Pain - Standard  Goal: Alleviation of pain or a reduction in pain to the patient’s comfort goal  Outcome: Not Met   The patient is Stable - Low risk of patient condition declining or worsening    Shift Goals   Clinical Goals: Pain 6/10  Patient Goals: pain 6/10    Progress made toward(s) clinical / shift goals:      Patient is not progressing towards the following goals: Pain still remains 8/10      Problem: Pain - Standard  Goal: Alleviation of pain or a reduction in pain to the patient’s comfort goal  Outcome: Not Met

## 2024-02-08 NOTE — ASSESSMENT & PLAN NOTE
Patient reports past medical history of chronic intermittent abdominal pain which she attributes to SMA syndrome.  -Patient states she does not want nasogastric decompression as she does not tolerate NG tube well and has never had decompression in the past.  -Patient currently rates 8/10 abdominal pain, generalized throughout the entire abdomen, no peritoneal signs on exam, exam not consistent with surgical abdomen.   -Given patient's weight and vulnerable status will start with low-dose Dilaudid pain control regimen and adjust as necessary  -History of dilated common bile duct seen again on right upper quadrant ultrasound today, given normal AST, ALT and total bilirubin we will continue to monitor for evidence of cholestatic or retained stone after cholecystectomy.  -Monitor electrolytes as electrolyte derangement is common in SMA syndrome, mild hypokalemia on blood work drawn from the emergency department with potassium of 3.5, K-Dur 40 mEq p.o. given  -Bowel rest, will give clear liquid diet as patient feels she can drink water at this time and has not had any associated nausea with p.o. fluid intake  -Late admit in the day, report given to night resident, Radha Villa DO, who will reevaluate patient for any necessary changes in management  -GI consulted, evaluated patient, recommends advance diet as tolerated, wean pain medications.  -GI has signed off, appreciate recommendations.

## 2024-02-08 NOTE — PROGRESS NOTES
PIV Infiltrated 0920 running potassium chloride. Patient has difficult access, awaiting ultrasound guided PIV placement to continue potassium infusion.

## 2024-02-08 NOTE — CARE PLAN
The patient is Stable - Low risk of patient condition declining or worsening    Shift Goals  Clinical Goals: Pain Control 6/10, IV fluids, advance diet  Patient Goals: Pain control 6/10  Family Goals: NAIN    Progress made toward(s) clinical / shift goals:  pt met pain goal 6/10.     Patient is not progressing towards the following goals:Pt has pain and RA flare up with ambulation - denied walking to the bathroom, used a female commode alternatively. Needs pain medication Q3 to maintain pain level and a manageable level.       Problem: Fall Risk  Goal: Patient will remain free from falls  Outcome: Not Met     Problem: Fall Risk  Goal: Patient will remain free from falls  Outcome: Not Met     Problem: Knowledge Deficit - Standard  Goal: Patient and family/care givers will demonstrate understanding of plan of care, disease process/condition, diagnostic tests and medications  Outcome: Progressing

## 2024-02-08 NOTE — PROGRESS NOTES
New PIV placed via ultrasound guidance. Will resume IV medication administration of Potassium Chloride per MAR.

## 2024-02-08 NOTE — PROGRESS NOTES
4 Eyes Skin Assessment Completed by LEANNE Singh and LEANNE Cabral.    Head WDL  Ears WDL  Nose WDL  Mouth WDL  Neck WDL  Breast/Chest WDL  Shoulder Blades WDL  Spine WDL  (R) Arm/Elbow/Hand Scar, R amputation of fingers digits 1-4, thumb not amputated   (L) Arm/Elbow/Hand Contracted fingers   Abdomen Scar  Groin WDL  Scrotum/Coccyx/Buttocks WDL  (R) Leg WDL  (L) Leg WDL  (R) Heel/Foot/Toe Toes dry and contracted  (L) Heel/Foot/Toe Toes dry and contracted          Devices In Places n/a      Interventions In Place N/A    Possible Skin Injury No    Pictures Uploaded Into Epic N/A  Wound Consult Placed N/A  RN Wound Prevention Protocol Ordered No

## 2024-02-08 NOTE — THERAPY
Physical Therapy   Initial Evaluation     Patient Name: Mary Martinez  Age:  53 y.o., Sex:  female  Medical Record #: [id number]  Today's Date: 2/8/2024     Precautions  Precautions: Fall Risk    Assessment  Patient is 53 y.o. female with admitted due to abdominal pain.  Pt with h/o multiple  hospitalizations, RA, Right finger amputations, malnutrition, SMA syndrome, SBO, substance abuse. Pt  presenting with generalized weakness, decreased muscle bulk, and decreased tolerance for upright activity. Pt will benefit from PT while in house to maximize mobility skills for DC home with family assistance.    Plan    Physical Therapy Initial Treatment Plan   Treatment Plan : Bed Mobility, Equipment, Gait Training, Manual Therapy, Neuro Re-Education / Balance, Self Care / Home Evaluation, Stair Training, Therapeutic Activities, Therapeutic Exercise  Treatment Frequency: 3 Times per Week  Duration: Until Therapy Goals Met    DC Equipment Recommendations: Unable to determine at this time  Discharge Recommendations: (P) Recommend home health for continued physical therapy services         Initial Contact Note    Initial Contact Note Order Received and Verified, Physical Therapy Evaluation in Progress with Full Report to Follow.   Precautions   Precautions Fall Risk   Pain   Intervention Medication (see MAR)   Pain 0 - 10 Group   Location Abdomen;Sternum   Location Orientation Right;Left;Upper   Pain Rating Scale (NPRS) 8   Description Sharp;Aching   Comfort Goal 6   Prior Living Situation   Prior Services Intermittent Physical Support for ADL Per Family;None   Housing / Facility 1 Story House   Steps Into Home 0   Steps In Home 0   Bathroom Set up Bathtub / Shower Combination;Grab Bars   Equipment Owned 4-Wheel Walker;Single Point Cane;Wheelchair   Lives with - Patient's Self Care Capacity Child Less than 18 Years of Age;Adult Children   Prior Level of Functional Mobility   Bed Mobility Required Assist   Transfer Status  Required Assist   Ambulation Independent   Ambulation Distance household   Stairs Required Assist   Comments Re   Cognition    Level of Consciousness Alert   Active ROM Upper Body   Comments h/o RA with amp and joint deformities.   Passive ROM Lower Body   Passive ROM Lower Body WDL   Active ROM Lower Body    Active ROM Lower Body  WDL   Strength Lower Body   Lower Body Strength  X   Gross Strength Generalized Weakness, Equal Bilaterally   Balance Assessment   Sitting Balance (Static) Fair +   Sitting Balance (Dynamic) Fair   Standing Balance (Static) Fair   Standing Balance (Dynamic) Fair -   Weight Shift Sitting Good   Weight Shift Standing Fair   Comments w/FWW   Bed Mobility    Supine to Sit Standby Assist   Sit to Supine Standby Assist   Scooting Standby Assist   Gait Analysis   Gait Level Of Assist Contact Guard Assist   Assistive Device Front Wheel Walker   Distance (Feet) 12   # of Times Distance was Traveled 1   Deviation Antalgic;Decreased Base Of Support;Step To;Shuffled Gait;Decreased Heel Strike;Decreased Toe Off   # of Stairs Climbed 0   Weight Bearing Status no restrictions.   Functional Mobility   Sit to Stand Minimal Assist   Bed, Chair, Wheelchair Transfer Minimal Assist   Toilet Transfers Minimal Assist   How much difficulty does the patient currently have...   Turning over in bed (including adjusting bedclothes, sheets and blankets)? 1   Sitting down on and standing up from a chair with arms (e.g., wheelchair, bedside commode, etc.) 1   Moving from lying on back to sitting on the side of the bed? 1   How much help from another person does the patient currently need...   Moving to and from a bed to a chair (including a wheelchair)? 1   Need to walk in a hospital room? 2   Climbing 3-5 steps with a railing? 1   6 clicks Mobility Score 7   Short Term Goals    Short Term Goal # 1 Pt will perform bed mobility a S level by tx 6   Short Term Goal # 2 Pt will transfer with FWW a S level by tx 6   Short  Term Goal # 3 Pt will ambulate with FWW for 50 ft w/S by tx 6   Education Group   Education Provided Role of Physical Therapist   Role of Physical Therapist Patient Response Patient;Acceptance;Explanation;Verbal Demonstration   Physical Therapy Initial Treatment Plan    Treatment Plan  Bed Mobility;Equipment;Gait Training;Manual Therapy;Neuro Re-Education / Balance;Self Care / Home Evaluation;Stair Training;Therapeutic Activities;Therapeutic Exercise   Treatment Frequency 3 Times per Week   Duration Until Therapy Goals Met   Problem List    Problems Pain;Impaired Bed Mobility;Impaired Transfers;Impaired Ambulation;Functional ROM Deficit;Functional Strength Deficit;Impaired Balance;Decreased Activity Tolerance   Anticipated Discharge Equipment and Recommendations   DC Equipment Recommendations Unable to determine at this time   Discharge Recommendations Recommend home health for continued physical therapy services   Interdisciplinary Plan of Care Collaboration   IDT Collaboration with  Nursing   Patient Position at End of Therapy In Bed;Phone within Reach;Tray Table within Reach;Call Light within Reach   Collaboration Comments staff updated.   Session Information   Date / Session Number  2/8-1 ( 1/3,2/14)

## 2024-02-08 NOTE — PROGRESS NOTES
Report received from ED.  Patient arrived on unit via bed at 1650.  Patient transferred to bed in room via ambulation.  Patient oriented to room, unit and plan of care.  Assessment performed, no other needs at this time.  Will continue to monitor.

## 2024-02-08 NOTE — DIETARY
"Nutrition services: Day 1 of admit.  Mary Martinez is a 53 y.o. female with admitting DX of Nausea and vomiting. History includes SMA syndrome.    Consult received for Supplements. Pt with high risk diagnosis of severe protein calorie malnutrition. Malnutrition Screening Tool score of 5 for unplanned weight loss of 34 or more lb and poor appetite.    Spoke with pt at bedside. Pt states she lost weight at the beginning of last year to 88 lb and she has been stable at that weight ever since. She reports she did have a feeding tube previously, but this was removed. She states she eats 4-5 small meals per day and feel that she eats very well. She does not know why she can't regain weight. She states she does not like oral nutrition supplements or milkshakes. When her diet does advance, she requests to continue multiple small meals.    Assessment:  Height: 160 cm (5' 2.99\")  Weight: 40.5 kg (89 lb 4.6 oz)  Body mass index is 15.82 kg/m²., BMI classification: Underweight  Diet/Intake: NPO, sips of water ad beata ok    Evaluation:   Weight per chart review:    2/17/23 = 49.3 kg   5/25/23 = 39 kg  Noted severe fat loss at orbitals, severe muscle loss at temples.  History of PEG-J, however, this was removed due to infection per GI note.  Labs and medications reviewed.    Malnutrition Risk: Pt meets ASPEN criteria for severe malnutrition in context of chronic illness related to SMA syndrome as evidenced by severe fat loss at orbitals, severe muscle loss at temples. Pt with 20.8% weight loss x 3 months last year and has been unable to regain weight.    Recommendations/Plan:  Diet advancement per MD.  Once diet advanced, recommend multiple small meals per pt preference.  Recommend against addition of oral nutrition supplements as pt states she will not drink these.   Once diet advanced, encourage intake of meals and snacks >50%.  Document intake of all meals and snacks as % taken in ADL's to provide interdisciplinary " communication across all shifts.   Monitor weight.  Nutrition rep will continue to see patient for ongoing meal and snack preferences.     RD following

## 2024-02-08 NOTE — ASSESSMENT & PLAN NOTE
Mild hypokalemia with potassium of 3.5 in the emergency department  - Replete with 40 mill equivalents K-Dur p.o.

## 2024-02-08 NOTE — PROGRESS NOTES
Assumed care of pt at 1900. Report received from Day RN. Pt is A&O x 4. Patient stated that their pain is 9/10, pt has been medicated per the MAR. Pt's pain comfort goal is 6/10. Pt educated on how to use the call light, pt verbalized understanding. Pt educated on her current diet per the MD. Bed in lowest locked position, call light within reach, hourly rounding in place. Labs reviewed, orders reviewed, communication board updated. Pt declines any further needs at this time.

## 2024-02-08 NOTE — PROGRESS NOTES
Received report, assumed pt care. Pt a&o 4, VSS, Assessment completed. Continues to have pain in the abdominal upper sternum region 8/10, managed by medication (see MAR). Complaining of rheumatoid arthritis pain exacerbation, generalized and RUE. Resting comfortably in bed with call light, bedside table in reach. No c/o at this time. Side rails up. Instructed to use call light when needing to get OOB verbalized understanding. Bed alarm on, bed in low position. Will continue to monitor.

## 2024-02-08 NOTE — H&P
[facility] MEDICINE HISTORY AND PHYSICAL     PATIENT ID:  NAME:  Mary Martinez  MRN:               [id number]  YOB: 1970    Date of Admission: 2/7/2024     Attending: Dr. Johansen     Resident: Dr. Arenas    Primary Care Physician:  West Aguilera MD     CC: Abdominal pain    HPI: Mary Martinez is a 53 y.o. female with a past medical history of chronic pain syndrome, GERD, rheumatoid arthritis requiring amputation of digits of the right hand, malnutrition, small bowel obstruction, substance abuse, SMA syndrome who presented with abdominal pain over the past few days.  She describes this abdominal pain as approximately 6-8 out of 10 on the pain scale and describes as similar to prior episodes of SMA syndrome.  Patient was seen in the emergency department last night for chest pain and diarrhea.  At that time had cardiac workup which was negative, dilation of the common bile duct was noted on ultrasound and noted to be chronic though slightly worse.  Patient does have a history of cholecystectomy.  Patient was observed and eventually discharged home with [facility].  Patient notes that she has not had improvement of her pain over the past 24 hours and so to come back to the emergency department for further evaluation.  Patient notes history of chronic opioid use and states she would need pain control.  She denies any history of nasogastric decompression with prior episodes of SMA syndrome.  She notes some decreased appetite but states she feels she is able to drink water without any associated nausea or vomiting.  Patient reports no other concerns at this time.  She denies any recent chest pain, dizziness, lightheadedness, shortness of breath, fever, chills, headache, falls, trauma or injury.    REVIEW OF SYSTEMS:   Ten systems reviewed and were negative except as noted in the HPI.                PAST MEDICAL HISTORY:  Past Medical History:   Diagnosis Date    Acute renal failure (ARF) (HCC)     Acute  renal failure (HCC) 10/06/2011    Allergy     Anemia     Anxiety     Arthritis     Rheumatoid -follows with rheumatologist. Has several fractures due to RA.    ASTHMA     inhalers prn    Blood transfusion without reported diagnosis     Bowel habit changes     4/24/20-constipation and diarrhea.    Bronchitis last approx 2018    Chronic pain 04/24/2020    Due to RA    Dental disorder     no teeth upper-does not wear her denture. Broken teeth on bottom.    Depression     GERD (gastroesophageal reflux disease)     GIB (gastrointestinal bleeding)     Head ache     Heart burn     taking famotidine    Hiatal hernia     Hiatus hernia syndrome     History of pancreatitis     Hypokalemia     Hyponatremia     Idiopathic chronic pancreatitis (HCC)     Indigestion     taking famotidine    Intractable nausea and vomiting     Kidney disease     Leukocytosis 10/08/2011    Pain     CPS-everywhere    Pneumonia 10/2019    PONV (postoperative nausea and vomiting)     Psychiatric problem     anxiety and depression    Rheumatoid aortitis     Rheumatoid arthritis(714.0) 2003    Severe protein-calorie malnutrition (HCC)     Small bowel obstruction (HCC) 10/06/2011    SMAS (superior mesenteric artery syndrome) (HCC)     Substance abuse ([facility])     UTI (urinary tract infection) 05/25/2023    Vitamin B12 deficiency neuropathy (Former[facility])        PAST SURGICAL HISTORY:  Past Surgical History:   Procedure Laterality Date    CO UPPER GI ENDOSCOPY,DIAGNOSIS N/A 8/16/2023    Procedure: GASTROSCOPY;  Surgeon: Blossom Peres M.D.;  [facility];  Service: Gastroenterology    CO PLACE PERCUT GASTROSTOMY TUBE N/A 6/12/2023    Procedure: INSERTION, PEG TUBE - PEG AND J TUBE PLACEMENT;  Surgeon: Marilyn Anderson M.D.;  [facility];  Service: Gastroenterology    CO UPPER GI ENDOSCOPY,DIAGNOSIS  6/12/2023    Procedure: GASTROSCOPY;  Surgeon: Marilyn Anderson M.D.;  [facility];  Service:  Gastroenterology    HI UPPER GI ENDOSCOPY,DIAGNOSIS N/A 9/9/2022    Procedure: ENDOSCOPIC ULTRASOUND- UPPER;  Surgeon: Jorge Brooke M.D.;  Location: [facility];  Service: EUS    EGD W/ENDOSCOPIC ULTRASOUND N/A 9/9/2022    Procedure: EGD, WITH ENDOSCOPIC US;  Surgeon: Jorge Brooke M.D.;  Location: [facility];  Service: EUS    HI ENDOSCOPIC US EXAM, ESOPH  4/29/2020    Procedure: EGD, WITH ENDOSCOPIC US;  Surgeon: Jorge Brooke M.D.;  Location: [facility];  Service: Gastroenterology    GASTROSCOPY-ENDO  4/29/2020    Procedure: GASTROSCOPY;  Surgeon: Jorge Brooke M.D.;  Location: [facility];  Service: Gastroenterology    EGD WITH ASP/BX  4/29/2020    Procedure: EGD, WITH ASPIRATION BIOPSY - POSS FNA;  Surgeon: Jorge Brooke M.D.;  Location: [facility];  Service: Gastroenterology    HI EXPLORATORY OF ABDOMEN N/A 10/4/2019    Procedure: LAPAROTOMY, EXPLORATORY AND REPAIR OF DUODENAL PERFORATION;  Surgeon: James Dumont M.D.;  Location: [facility];  Service: General    COLONOSCOPY  2018    with upper endoscopy    FINGER ARTHROPLASTY Right 6/5/2017    Procedure: FINGER ARTHROPLASTY - LONG, RING AND SMALL VOLAR PLATE;  Surgeon: Lobo Rosen M.D.;  Location: SURGERY SAME DAY [facility];  Service:     FINGER AMPUTATION  6/5/2017    Procedure: FINGER AMPUTATION - LONG, RING AND SMALL AT THE PROXIMAL INTERPHALANGEAL JOINT;  Surgeon: Lobo Rosen M.D.;  Location: SURGERY SAME DAY [facility];  Service:     FINGER ARTHROPLASTY Right 4/17/2017    Procedure: FINGER ARTHROPLASTY ;  Surgeon: Lobo Rosen M.D.;  Location: SURGERY SAME DAY [facility];  Service:     FINGER AMPUTATION Right 9/14/2016    Procedure: FINGER AMPUTATION INDEX;  Surgeon: Lobo Rosen M.D.;  Location: SURGERY SAME [facility];  Service:     IRRIGATION & DEBRIDEMENT ORTHO Right 9/11/2016     Procedure: IRRIGATION & DEBRIDEMENT ORTHO - right index finger;  Surgeon: Madhu Rosen M.D.;  Location: SURGERY [facility];  Service:     FUSION, PIP JOINT, TOE Right 8/29/2016    Procedure: RE-DO INDEX FINGER PROXIMAL INTERPHALANGEAL ARTHRODESIS;  Surgeon: Lobo Rosen M.D.;  Location: SURGERY SAME DAY [facility];  Service:     BONE GRAFT Right 8/29/2016    Procedure: BONE GRAFT - DISTAL RADIUS ;  Surgeon: Lobo Rosen M.D.;  Location: SURGERY SAME DAY [facility];  Service:     ARTHRODESIS Right 5/6/2016    Procedure: ARTHRODESIS INDEX FINGER PROXIMAL INTERPHALANGEAL;  Surgeon: Lobo Rosen M.D.;  Location: SURGERY SAME DAY [facility];  Service:     FOOT RECONSTRUCTION RHEUMATIC Left 7/29/2015    Procedure: FOOT RECONSTRUCTION RHEUMATIC;  Surgeon: Heriberto Alves M.D.;  Location: SURGERY [facility];  Service:     TOE FUSION Right 5/27/2015    Procedure: TOE FUSION 1ST METATARSALPHALANGEAL JOINT;  Surgeon: Heriberto Alves M.D.;  Location: SURGERY [facility];  Service:     BONE SPUR EXCISION  5/27/2015    Procedure: BONE SPUR EXCISION METATARSAL HEAD 2-3;  Surgeon: Heriberto Alves M.D.;  Location: SURGERY [facility];  Service:     HAMMERTOE CORRECTION  5/27/2015    Procedure: HAMMERTOE CORRECTION AND SOFT TISSUE RE-ALINGMENT 2-3 ;  Surgeon: Heriberto Alves M.D.;  Location: SURGERY [facility];  Service:     DENTAL EXTRACTION(S)  2014    all of upper teeth    ABDOMINAL ABSCESS DRAINAGE  9/27/2011    Performed by VERO WRIGHT at [facility]    EMANUEL BY LAPAROSCOPY  9/27/2011    Performed by VERO WRIGHT at [facility]    APPENDECTOMY  2011    Found out it had ruptured prior to abcess surgery    REPEAT C SECTION  2008    REPEAT C SECTION  2005    REPEAT C SECTION  1999    PRIMARY C SECTION  1991    TONSILLECTOMY  1982    WRIST EXPLORATION Left 1980's    fractured wrist-no hardware       FAMILY HISTORY:  Family History    Problem Relation Age of Onset    Cancer Mother     Heart Disease Mother     Hypertension Mother     Heart Disease Father     Hypertension Father        SOCIAL HISTORY:   Pt lives in [place]  Smoking patient states she quit a few months ago and has been abstinent from tobacco  Etoh use patient denies any history  Drug use patient denies any history of drug use    DIET:   Orders Placed This Encounter   Procedures    Diet Order Diet: Clear Liquid     Standing Status:   Standing     Number of Occurrences:   1     Order Specific Question:   Diet:     Answer:   Clear Liquid [10]       ALLERGIES:  Allergies   Allergen Reactions    Penicillins Anaphylaxis and Hives     Tolerates cephalosporins; reports throat swelling with PCN    Aripiprazole Nausea     Spasms, shaking      Nitrous Oxide Vomiting       OUTPATIENT MEDICATIONS:    Current Facility-Administered Medications:     potassium chloride SA (Kdur) tablet 40 mEq, 40 mEq, Oral, Once, Jorge Arenas M.D.    omeprazole (PriLOSEC) capsule 40 mg, 40 mg, Oral, DAILY, Jorge Arenas M.D.    QUEtiapine (SEROquel) tablet 100 mg, 100 mg, Oral, Nightly, Jorge Arenas M.D.    PARoxetine (Paxil) tablet 30 mg, 30 mg, Oral, DAILY, Jorge Arenas M.D., 30 mg at 02/07/24 1740    lactated ringers infusion, , Intravenous, Continuous, Jorge Arenas M.D., Last Rate: 75 mL/hr at 02/07/24 1736, New Bag at 02/07/24 1736    enoxaparin (Lovenox) inj 40 mg, 40 mg, Subcutaneous, DAILY AT 1800, Jorge Arenas M.D., 40 mg at 02/07/24 1741    labetalol (Normodyne/Trandate) injection 10 mg, 10 mg, Intravenous, Q4HRS PRN, Jorge Arenas M.D.    senna-docusate (Pericolace Or Senokot S) 8.6-50 MG per tablet 2 Tablet, 2 Tablet, Oral, Q EVENING **AND** polyethylene glycol/lytes (Miralax) Packet 1 Packet, 1 Packet, Oral, QDAY PRN, Jorge Arenas M.D.    acetaminophen (Tylenol) tablet 650 mg, 650 mg, Oral, Q6HRS PRN, Jorge Arenas M.D.    Notify provider if pain remains uncontrolled, ,  , CONTINUOUS **AND** Use the Numeric Rating Scale (NRS), Brennan-Baker Faces (WBF), or FLACC on regular floors and Critical-Care Pain Observation Tool (CPOT) on ICUs/Trauma to assess pain, , , CONTINUOUS **AND** Pulse Ox, , , CONTINUOUS **AND** Pharmacy Consult Request ...Pain Management Review 1 Each, 1 Each, Other, PHARMACY TO DOSE **AND** If patient difficult to arouse and/or has respiratory depression (respiratory rate of 10 or less), stop any opiates that are currently infusing and call a Rapid Response., , , CONTINUOUS, Jorge Arenas M.D.    oxyCODONE immediate-release (Roxicodone) tablet 2.5 mg, 2.5 mg, Oral, Q3HRS PRN **OR** oxyCODONE immediate-release (Roxicodone) tablet 5 mg, 5 mg, Oral, Q3HRS PRN **OR** HYDROmorphone (Dilaudid) injection 0.25 mg, 0.25 mg, Intravenous, Q3HRS PRN, Jorge Arenas M.D.    ondansetron (Zofran) syringe/vial injection 4 mg, 4 mg, Intravenous, Q4HRS PRN, Jorge Arenas M.D.    ondansetron (Zofran ODT) dispertab 4 mg, 4 mg, Oral, Q4HRS PRN, Jorge Arenas M.D.    promethazine (Phenergan) tablet 12.5-25 mg, 12.5-25 mg, Oral, Q4HRS PRN, Jorge Arenas M.D.    promethazine (Phenergan) suppository 12.5-25 mg, 12.5-25 mg, Rectal, Q4HRS PRN, Jorge Arenas M.D.    prochlorperazine (Compazine) injection 5-10 mg, 5-10 mg, Intravenous, Q4HRS PRN, Jorge Arenas M.D.    PHYSICAL EXAM:  Vitals:    24 1502 24 1532 24 1602 24 1647   BP: 112/68 112/68 116/69 117/70   Pulse: 78 86 85 86   Resp: 15 14  16   Temp:    36.2 °C (97.2 °F)   TempSrc:    Temporal   SpO2: 96% 94% 95% 95%   Weight:       Height:       , Temp (24hrs), Av.2 °C (97.1 °F), Min:36.1 °C (97 °F), Max:36.2 °C (97.2 °F)  , Pulse Oximetry: 95 %, O2 Delivery Device: None - Room Air    General: Thin, cachectic female who appears older than stated age, pt resting in NAD, pleasant and cooperative   Skin:  Pink, warm and dry.  No rashes  HEENT: NC/AT. PERRL. EOMI. MMM. No nasal discharge.  Oropharynx nonerythematous without exudate/plaques  Neck:  Supple without lymphadenopathy or rigidity.  Lungs:  Symmetrical.  CTAB with no adventitious breath sounds.  Good air movement   Cardiovascular:  Normal S1/S2, RRR without M/R/G.  Abdomen:  BS+, Soft, mild to moderate abdominal tenderness to palpation, no peritoneal signs, no distention. No masses noted.  Extremities:  Full range of motion.  Multiple digit amputations of the right hand which she attributes to history of rheumatoid arthritis, multiple RA nodules to left hand, neurovascularly intact  Spine: No midline tenderness, no deformity, no step-offs  CNS:  A&Ox4, follows commands, Strength 5/5 in all extremities.       ERCourse:  Patient presented to the emergency department complaining of abdominal pain.  Throughout evaluation in the emergency department patient remained afebrile, pulse between 70 to 80s, respiratory rate between 12-16, blood pressure stable 110s to 130s over 60s to 80s, pulse oxygenation stable in the mid 90s on room air.  Weight was reported to 88 pounds and patient appeared consistent with malnutrition.  Patient was treated with Benadryl, fentanyl, GI cocktail, given a 1 L bolus, metoclopramide 10 mg, 4 mg morphine with some improvement in her pain.  Patient continued to have moderate pain.  Labs showed no leukocytosis, hemoglobin 11.4, mildly elevated platelet count 549, mild hypokalemia 3.5, slightly elevated glucose at 126, CO2 of 11, urinalysis she had 30 protein, 5-10 WBCs but otherwise unremarkable.  Beta-hCG was negative.  Right upper quadrant abdominal sound shows prior cholecystectomy and some hepatic biliary dilation as well as pancreatic duct dilation as seen on prior CT.  Decision was made to admit patient for further evaluation to hospital team.    LAB TESTS:   Recent Labs     02/07/24  1204   WBC 8.2   RBC 3.63*   HEMOGLOBIN 11.4*   HEMATOCRIT 34.3*   MCV 94.5   MCH 31.4   RDW 51.1*   PLATELETCT 549*   MPV 9.4    NEUTSPOLYS 87.40*   LYMPHOCYTES 8.20*   MONOCYTES 3.20   EOSINOPHILS 0.20   BASOPHILS 0.50         Recent Labs     02/07/24  1204   SODIUM 137   POTASSIUM 3.5*   CHLORIDE 112   CO2 11*   BUN 9   CREATININE 0.58   CALCIUM 8.6   MAGNESIUM 2.1   ALBUMIN 3.5       CULTURES:   Results       Procedure Component Value Units Date/Time    URINALYSIS,CULTURE IF INDICATED [[id number]]  (Abnormal) Collected: 02/07/24 1452    Order Status: Completed Specimen: Urine Updated: 02/07/24 1504     Color Yellow     Character Clear     Specific Gravity 1.015     Ph 6.5     Glucose Negative mg/dL      Ketones Negative mg/dL      Protein 30 mg/dL      Bilirubin Negative     Urobilinogen, Urine 0.2     Nitrite Negative     Leukocyte Esterase Negative     Occult Blood Negative     Micro Urine Req Microscopic    Narrative:      Indication for culture:->Patient WITHOUT an indwelling Sullivan  catheter in place with new onset of Dysuria, Frequency,  Urgency, and/or Suprapubic pain            IMAGES:  [id number] VIEW   Final Result         No specific finding to suggest small bowel obstruction.      US-RUQ   Final Result      1.  Prior cholecystectomy.   2.  Intrahepatic and extra hepatic biliary dilation as well as pancreatic duct dilation as seen on prior CT, concerning for distal stricture, stone or mass.   3.  Nonobstructing RIGHT kidney stones measuring 4 mm.            CONSULTS:   None    ASSESSMENT/PLAN: Mary Martinez is a 53 y.o. female with a past medical history of chronic pain syndrome, GERD, rheumatoid arthritis requiring amputation of digits of the right hand, malnutrition, small bowel obstruction, substance abuse, SMA syndrome who presented with abdominal pain over the past few days.    Chronic pain syndrome  Control pain is appropriate, see #abdominal pain plan    Common bile duct dilation  Appears to be chronic on multiple images  See #abdominal pain plan    Severe protein-calorie malnutrition (HCC)  His weight 88 pounds,  physical exam consistent with malnutrition and sarcopenia  -Of note patient's prior macrocytic anemia has resolved  -Nutrition consult, appreciate recommendations  -Continue to monitor    Generalized abdominal pain  Patient reports past medical history of chronic intermittent abdominal pain which she attributes to SMA syndrome.  -Patient states she does not want nasogastric decompression as she does not tolerate NG tube well and has never had decompression in the past.  -Patient currently rates 8/10 abdominal pain, generalized throughout the entire abdomen, no peritoneal signs on exam, exam not consistent with surgical abdomen.   -Given patient's weight and vulnerable status will start with low-dose Dilaudid pain control regimen and adjust as necessary  -History of dilated common bile duct seen again on right upper quadrant ultrasound today, given normal AST, ALT and total bilirubin we will continue to monitor for evidence of cholestatic or retained stone after cholecystectomy.  -Monitor electrolytes as electrolyte derangement is common in SMA syndrome, mild hypokalemia on blood work drawn from the emergency department with potassium of 3.5, K-Dur 40 mEq p.o. given  -Bowel rest, will give clear liquid diet as patient feels she can drink water at this time and has not had any associated nausea with p.o. fluid intake  -Late admit in the day, report given to night resident, Radha Villa DO, who will reevaluate patient for any necessary changes in management    Hypokalemia  Mild hypokalemia with potassium of 3.5 in the emergency department  - Replete with 40 mill equivalents K-Dur p.o.    GERD (gastroesophageal reflux disease)  - Continue omeprazole 40 mg daily at this time        Core Measures:  Fluids: LR 73ml/hr  Lines: PIV  Abx: None  Diet: Clear liquid  PPX: Lovenox   DISPO: Likely discharge to home with HH, PT/OT.     CODE STATUS: DNAR/DNI      Jorge Arenas MD  PGY3  [facility]

## 2024-02-08 NOTE — THERAPY
"Speech Language Pathology   Clinical Swallow Evaluation     Patient Name: Mary Martinez  AGE:  53 y.o., SEX:  female  Medical Record #: [id number]  Date of Service: 2/8/2024    History of Present Illness  Pt is a 53 y.o. F w/ complex medical history including: chronic pain syndrome, GERD, rheumatoid arthritis requiring amputation of digits of the right hand, malnutrition, small bowel obstruction, substance abuse, SMA syndrome who presented with abdominal pain.     CMHx: Severe protein-calorie malnutrition, Hypokalemia, Chronic pain syndrome, GERD, N & V, Generalized abdominal pain, Anxiety and depression     Pt evaluated by GI, Dr. Peres, recommendations are as follows: \"Patient grossly normal liver, biliary labs, no plan to provide intervention follow-up, bile duct dilatation.  Agree with other current supportive care from her primary team.  Try to slowly resume oral intake and also decrease the dose of pain medication.  The patient adamantly saying that she will tr to avoid further surgery or endoscopy, which GI team certainly agrees.No other intervention from GI standpoint for now.  GI team sign off.  Please call us back if any major changes noted.\"     No admit CXR.     Hx of ST per EMR  -CSE complete 523 recommending full liquid (per pt preference) w/ signs of esophageal dysphagia. Advanced to EC7/TN0 prior to discharge.   -CSE completed 8/22 recommending R7/TN0 diet.     General Information:  Vitals  O2 (LPM): 0  O2 Delivery Device: None - Room Air  Level of Consciousness: Alert, Awake  Patient Behaviors: Restless  Orientation: Oriented x 4  Follows Directives: Yes    Prior Living Situation & Level of Function:  Prior Services: Intermittent Physical Support for ADL Per Family  Lives with - Patient's Self Care Capacity: Child Less than 18 Years of Age, Adult Children  Comments: Pt lives with children (14, 18, 21); 21 y.o. has Down's syndrome, but is high functioning. Pt's kids assist with I-ADL and " intermittently with ADL. Pt reports having spouse who does not live with them and only helps financially.  Communication: WFL  Swallowing: Hx of esophageal dysphagia, G-J tube removed 08/23 d/t recurrent infection     Oral Mechanism Evaluation:  Dentition: Denture(s) not available at time of evaluation (few lower dentition, edentulous otherwise)   Facial Symmetry: Equal  Facial Sensation: Equal     Labial Observations: WFL   Lingual Observations: Midline  Motor Speech: WFL       Laryngeal Function:  Secretion Management: Adequate  Voice Quality: Hoarse (pt endorsing baseline)  Cough: Perceptually weak     Subjective  Pt cleared by RN and MD for clinical bedside swallow evaluation. Okay per MD to trial textures beyond clear liquids. Pt was received awake/alert and was cooperative w/ SLP tasks. Pt stated she was hungry, eager for PO. Pt consuming mostly PU4 textures at baseline w/ some EC7 as well.     Assessment  Current Method of Nutrition: NPO until cleared by speech pathology  Positioning: Arambula's (60-90 degrees)  Bolus Administration: Patient  O2 (LPM): 0 O2 Delivery Device: None - Room Air  Factor(s) Affecting Performance: None    Swallowing Trials:  Swallowing Trials  Thin Liquid (TN0): WFL  Liquidised (LQ3): WFL  Pureed (PU4): WFL    Comments: Pt w/ adequate oral acceptance/containment. Complete A/P transfer without notable oral bolus residue. Vocal quality remained stable throughout PO intake. Single swallow completed per bolus. Reported transient globus pain in chest in which resolved w/ use of liquid wash. Patient adequately self-feeding with appropriate rate and volume of intake. Discussed IDDSI levels; patient elected puree solid/thin liquid diet at this time. Provided education regarding general aspiration/reflux precautions as well as signs of aspiration. All questions addressed.       Pt endorsing preference for mashed potatoes and food avoidances including- acidic, spicy food, and red sauces. SLP in  "contact w/ nutrition rep who plans to follow-up with patient pending availability on 2/8 or 2/9.     Clinical Impressions  Pt's PMHx and presentation are consistent w/ esophageal dysphagia. No clinical indicators of aspiration appreciated on this date. Pt w/ hoarse vocal quality in which pt stated has been her baseline. D/t N/V pt is at an increased risk for ascending aspiration. Service will follow up to monitor pulmonary status and provide education. Suspect diet advancement may be pending pt's tolerance of PO. Thank you.     Recommendations  Diet Consistency: TN0/PU4 - per pt preference   Instrumentation: None indicated at this time  Medication: Cut large pills, One pill at a time, Whole with puree, As tolerated  Supervision: Distant supervision - check on patient 2-3 times per meal  Positioning: Fully upright and midline during oral intake, Remain upright for 45 minutes after oral intake, Meals sitting upright in a chair, as tolerated  Risk Management : Small bites/sips, Slow rate of intake, Physical mobility, as tolerated  Oral Care: BID    SLP Treatment Plan  Treatment Plan: Dysphagia Treatment, Patient/Family/Caregiver Training  SLP Frequency: 2x Per Week  Estimated Duration: Until Therapy Goals Met    Anticipated Discharge Needs  Discharge Recommendations: Anticipate that the patient will have no further speech therapy needs after discharge from the hospital   Therapy Recommendations Upon DC: Not Indicated      Patient / Family Goals  Patient / Family Goal #1: \"I do soft stuff because I don't have teeth.\"  Short Term Goals  Short Term Goal # 1: Pt will consume a TN0/PU4 diet without any overt s/s of aspiration or decline in respiratory status.  Short Term Goal # 2: Pt will demonstrate and verbalize reflux precautions independently across a 5 min meal.    Maryan Champagne, SLP   "

## 2024-02-08 NOTE — CONSULTS
Date of Consultation:  2/8/2024    Patient: : Mary Martinez  MRN: [id number]    Referring Physician:   Dilated common bile duct, abdominal painGI:Blossom Peres M.D.     Reason for Consultation: Dr. Ignacio Walters     History of Present Illness:   53 years old female, well-known to the GI service, presented to inpatient service for abdominal pain, poor intake, GI team is also consulted for the dilated common bile duct.    We saw the patient at bedside, the patient is receiving IV fluid support, IV pain medication, she feels better right now.  Will be thirsty and hungry and she is willing to try to resume oral intake.    The patient reports to me after multiple times of infection of previous PEG-J tube, the patient and the emergency doctor remove the feeding tube in September 2023.    Otherwise, no evidence of GI bleeding or other new GI situation.      Past Medical History:   Diagnosis Date    UTI (urinary tract infection) 05/25/2023    Chronic pain 04/24/2020    Due to RA    Pneumonia 10/2019    Leukocytosis 10/08/2011    Acute renal failure (HCC) 10/06/2011    Small bowel obstruction (HCC) 10/06/2011    Rheumatoid arthritis(714.0) 2003    Acute renal failure (ARF) (HCC)     Allergy     Anemia     Anxiety     Arthritis     Rheumatoid -follows with rheumatologist. Has several fractures due to RA.    ASTHMA     inhalers prn    Blood transfusion without reported diagnosis     Bowel habit changes     4/24/20-constipation and diarrhea.    Bronchitis last approx 2018    Dental disorder     no teeth upper-does not wear her denture. Broken teeth on bottom.    Depression     GERD (gastroesophageal reflux disease)     GIB (gastrointestinal bleeding)     Head ache     Heart burn     taking famotidine    Hiatal hernia     Hiatus hernia syndrome     History of pancreatitis     Hypokalemia     Hyponatremia     Idiopathic chronic pancreatitis (HCC)     Indigestion     taking famotidine    Intractable nausea and vomiting      Kidney disease     Pain     CPS-everywhere    PONV (postoperative nausea and vomiting)     Psychiatric problem     anxiety and depression    Rheumatoid aortitis     Severe protein-calorie malnutrition (HCC)     SMAS (superior mesenteric artery syndrome) (HCC)     Substance abuse (HCC)     Vitamin B12 deficiency neuropathy (HCC)          Past Surgical History:   Procedure Laterality Date    NV UPPER GI ENDOSCOPY,DIAGNOSIS N/A 8/16/2023    Procedure: GASTROSCOPY;  Surgeon: Blossom Peres M.D.;  Location: SURGERY SAME DAY [facility];  Service: Gastroenterology    NV PLACE PERCUT GASTROSTOMY TUBE N/A 6/12/2023    Procedure: INSERTION, PEG TUBE - PEG AND J TUBE PLACEMENT;  Surgeon: Marilyn Anderson M.D.;  Location: SURGERY SAME DAY [facility];  Service: Gastroenterology    NV UPPER GI ENDOSCOPY,DIAGNOSIS  6/12/2023    Procedure: GASTROSCOPY;  Surgeon: Marilyn Anderson M.D.;  Location: SURGERY SAME DAY [facility];  Service: Gastroenterology    NV UPPER GI ENDOSCOPY,DIAGNOSIS N/A 9/9/2022    Procedure: ENDOSCOPIC ULTRASOUND- UPPER;  Surgeon: Jorge Brooke M.D.;  Location: [facility];  Service: EUS    EGD W/ENDOSCOPIC ULTRASOUND N/A 9/9/2022    Procedure: EGD, WITH ENDOSCOPIC US;  Surgeon: Jorge Brooke M.D.;  Location: [facility];  Service: EUS    NV ENDOSCOPIC US EXAM, ESOPH  4/29/2020    Procedure: EGD, WITH ENDOSCOPIC US;  Surgeon: Jorge Brooke M.D.;  Location: [facility];  Service: Gastroenterology    GASTROSCOPY-ENDO  4/29/2020    Procedure: GASTROSCOPY;  Surgeon: Jorge Brooke M.D.;  Location: [facility];  Service: Gastroenterology    EGD WITH ASP/BX  4/29/2020    Procedure: EGD, WITH ASPIRATION BIOPSY - POSS FNA;  Surgeon: Jorge Brooke M.D.;  Location: [facility];  Service: Gastroenterology    NV EXPLORATORY OF ABDOMEN N/A 10/4/2019    Procedure: LAPAROTOMY, EXPLORATORY AND REPAIR OF DUODENAL PERFORATION;   Surgeon: James Dumont M.D.;  Location: SURGERY [facility];  Service: General    COLONOSCOPY  2018    with upper endoscopy    FINGER ARTHROPLASTY Right 6/5/2017    Procedure: FINGER ARTHROPLASTY - LONG, RING AND SMALL VOLAR PLATE;  Surgeon: Lobo Rosen M.D.;  Location: SURGERY SAME DAY [facility];  Service:     FINGER AMPUTATION  6/5/2017    Procedure: FINGER AMPUTATION - LONG, RING AND SMALL AT THE PROXIMAL INTERPHALANGEAL JOINT;  Surgeon: Lobo Rosen M.D.;  Location: SURGERY SAME DAY [facility];  Service:     FINGER ARTHROPLASTY Right 4/17/2017    Procedure: FINGER ARTHROPLASTY ;  Surgeon: Lobo Rosen M.D.;  Location: SURGERY SAME DAY [facility];  Service:     FINGER AMPUTATION Right 9/14/2016    Procedure: FINGER AMPUTATION INDEX;  Surgeon: Lobo Rosen M.D.;  Location: SURGERY SAME DAY [facility];  Service:     IRRIGATION & DEBRIDEMENT ORTHO Right 9/11/2016    Procedure: IRRIGATION & DEBRIDEMENT ORTHO - right index finger;  Surgeon: Madhu Rosen M.D.;  Location: [facility];  Service:     FUSION, PIP JOINT, TOE Right 8/29/2016    Procedure: RE-DO INDEX FINGER PROXIMAL INTERPHALANGEAL ARTHRODESIS;  Surgeon: Lobo Rosen M.D.;  Location: SURGERY SAME DAY [facility];  Service:     BONE GRAFT Right 8/29/2016    Procedure: BONE GRAFT - DISTAL RADIUS ;  Surgeon: Lobo Rosen M.D.;  Location: SURGERY SAME DAY [facility];  Service:     ARTHRODESIS Right 5/6/2016    Procedure: ARTHRODESIS INDEX FINGER PROXIMAL INTERPHALANGEAL;  Surgeon: Lobo Rosen M.D.;  Location: SURGERY SAME DAY [facility];  Service:     FOOT RECONSTRUCTION RHEUMATIC Left 7/29/2015    Procedure: FOOT RECONSTRUCTION RHEUMATIC;  Surgeon: Heriberto Alves M.D.;  Location: [facility];  Service:     TOE FUSION Right 5/27/2015    Procedure: TOE FUSION 1ST METATARSALPHALANGEAL JOINT;  Surgeon: Heriberto Alves M.D.;  Location: [facility];  Service:     BONE SPUR EXCISION  5/27/2015    Procedure: BONE SPUR EXCISION METATARSAL HEAD 2-3;  Surgeon: Heriberto Alves M.D.;  Location: SURGERY [facility];  Service:     HAMMERTOE CORRECTION  5/27/2015    Procedure: HAMMERTOE CORRECTION AND SOFT TISSUE RE-ALINGMENT 2-3 ;  Surgeon: Heriberto Alves M.D.;  Location: SURGERY [facility];  Service:     DENTAL EXTRACTION(S)  2014    all of upper teeth    ABDOMINAL ABSCESS DRAINAGE  9/27/2011    Performed by VERO WRIGHT at SURGERY [facility]    EMANUEL BY LAPAROSCOPY  9/27/2011    Performed by VERO WRIGHT at SURGERY [facility]    APPENDECTOMY  2011    Found out it had ruptured prior to abcess surgery    REPEAT C SECTION  2008    REPEAT C SECTION  2005    REPEAT C SECTION  1999    PRIMARY C SECTION  1991    TONSILLECTOMY  1982    WRIST EXPLORATION Left 1980's    fractured wrist-no hardware       Family History   Problem Relation Age of Onset    Cancer Mother     Heart Disease Mother     Hypertension Mother     Heart Disease Father     Hypertension Father      Constitutional: No fevers.  Eyes: No symptoms reported.   Ears/Nose/Throat/Mouth: No choking reported.  Cardiovascular: No chest pain reported.   Respiratory: Denies shortness of breath.  Gastrointestinal/Hepatic: Per H/P.   Skin/Breast: No new rash reported.   Psychiatric: No complaints    HEENT: grossly normal.  Cardiovascular: Please refer to primary team's daily assessment.   Lungs: Please refer to primary team's daily assessment.   Abdomen: Soft, No tenderness.  Skin: No erythema, No rash.  Lower limbs: normal, no pitting edema.   Neurologic: Alert & oriented x 3, Normal motor function, No focal deficits noted.  PSY: stable mood.       Social History     Socioeconomic History    Marital status:      Spouse name: Ousmane    Number of children: 5   Tobacco Use    Smoking status: Former     Current packs/day: 0.00     Average packs/day: 0.5 packs/day for 30.0 years  "(15.0 ttl pk-yrs)     Types: Cigarettes     Quit date: 2023     Years since quittin.1     Passive exposure: Never    Smokeless tobacco: Never   Vaping Use    Vaping Use: Never used   Substance and Sexual Activity    Alcohol use: Never    Drug use: Never   Other Topics Concern     Service No    Blood Transfusions Yes    Caffeine Concern No    Occupational Exposure No    Hobby Hazards No    Sleep Concern No    Stress Concern Yes    Weight Concern No    Special Diet No    Back Care No    Exercise Yes    Bike Helmet Yes    Seat Belt Yes    Self-Exams Yes   Social History Narrative    ** Merged History Encounter **          Social Determinants of Health     Financial Resource Strain: Low Risk  (2023)    Overall Financial Resource Strain (CARDIA)     Difficulty of Paying Living Expenses: Not hard at all   Food Insecurity: No Food Insecurity (2023)    Hunger Vital Sign     Worried About Running Out of Food in the Last Year: Never true     Ran Out of Food in the Last Year: Never true   Transportation Needs: No Transportation Needs (2023)    PRAPARE - Transportation     Lack of Transportation (Medical): No     Lack of Transportation (Non-Medical): No   Housing Stability: Low Risk  (2023)    Housing Stability Vital Sign     Unable to Pay for Housing in the Last Year: No     Number of Places Lived in the Last Year: 1     Unstable Housing in the Last Year: No           Physical Exam:  Vitals:    24 1647 24 0415 24 0707   BP: 117/70 119/61 100/62 105/64   Pulse: 86 85 85 77   Resp: 16 18 19 20   Temp: 36.2 °C (97.2 °F) 37.1 °C (98.8 °F) 36.7 °C (98 °F) 36.6 °C (97.8 °F)   TempSrc: Temporal Temporal Temporal Temporal   SpO2: 95% 95% 95% 95%   Weight: 40.5 kg (89 lb 4.6 oz)      Height: 1.6 m (5' 2.99\")                Labs:  Recent Labs     24  1204 24  0513   WBC 8.2 6.9   RBC 3.63* 3.24*   HEMOGLOBIN 11.4* 10.3*   HEMATOCRIT 34.3* 30.9*   MCV 94.5 " 95.4   MCH 31.4 31.8   MCHC 33.2 33.3   RDW 51.1* 51.8*   PLATELETCT 549* 461*   MPV 9.4 9.1     Recent Labs     02/07/24  1204 02/08/24  0513   SODIUM 137 137   POTASSIUM 3.5* 3.5*   CHLORIDE 112 111   CO2 11* 13*   GLUCOSE 126* 85   BUN 9 8           Recent Labs     02/07/24  1204 02/08/24  0513   ASTSGOT 10* 7*   ALTSGPT 13 8   TBILIRUBIN 0.2 0.2   ALKPHOSPHAT 230* 205*   GLOBULIN 3.9* 3.6*         Imaging:  [id number] VIEW  Narrative: 2/7/2024 5:02 PM    HISTORY/REASON FOR EXAM:  Abdominal Pain    TECHNIQUE/EXAM DESCRIPTION AND NUMBER OF VIEWS:  1 view(s) of the abdomen.    COMPARISON: 8/24/2023    FINDINGS:    Nonspecific nonobstructive bowel gas pattern. No dilated gas-filled loop of small bowel.    No portal venous gas or pneumatosis.    No definite free intraperitoneal air but evaluation is limited on supine radiograph.  Impression: No specific finding to suggest small bowel obstruction.  US-RUQ  Narrative: 2/7/2024 12:19 PM    HISTORY/REASON FOR EXAM:  RIGHT upper quadrant pain    TECHNIQUE/EXAM DESCRIPTION AND NUMBER OF VIEWS:  Real-time sonography of the liver and biliary tree.    COMPARISON: CT abdomen and pelvis 1/6/2024    FINDINGS:  The liver is normal in contour. There is no evidence of solid mass lesion.  Prominent intrahepatic bile ducts.  The liver measures 17.47 cm.    Gallbladder is absent.  The common duct measures 9.20 mm.    Visualized pancreas shows prominent duct measuring 5 mm.  The visualized aorta is normal in caliber.    Intrahepatic IVC is patent.    The portal vein is patent with hepatopetal flow. The MPV measures 0.68 cm.    The right kidney measures 10.27 cm. Echogenic focus measuring 4 mm consistent with stone.    There is no ascites.  Impression: 1.  Prior cholecystectomy.  2.  Intrahepatic and extra hepatic biliary dilation as well as pancreatic duct dilation as seen on prior CT, concerning for distal stricture, stone or mass.  3.  Nonobstructing RIGHT kidney stones measuring 4  mm.            Impressions:  53 years old female, well-known to the GI service, presented to inpatient service for abdominal pain, poor intake, GI team is also consulted for the dilated common bile duct.    We saw the patient at bedside, the patient is receiving IV fluid support, IV pain medication, she feels better right now.  Some thirsty and hungry and she is willing to try to resume oral intake later.     Patient grossly normal liver, biliary labs, no plan to provide intervention follow-up, bile duct dilatation.  Agree with other current supportive care from her primary team.  Try to slowly resume oral intake and also decrease the dose of pain medication.  The patient adamantly saying that she will tr to avoid further surgery or endoscopy, which GI team certainly agrees.    No other intervention from GI standpoint for now.  GI team sign off.  Please call us back if any major changes noted.          This note was generated using voice recognition software which has a small chance of producing errors of grammar and possibly content. I have made every reasonable attempt to find and correct any obvious errors, but expect that some may not be found prior to finalization of this note.

## 2024-02-08 NOTE — ASSESSMENT & PLAN NOTE
His weight 88 pounds, physical exam consistent with malnutrition and sarcopenia  -Of note patient's prior macrocytic anemia has resolved  -Nutrition consult, appreciate recommendations  -Continue to monitor

## 2024-02-08 NOTE — CARE PLAN
The patient is Stable - Low risk of patient condition declining or worsening    Shift Goals  Clinical Goals: Pain control  Patient Goals: Pain control  Family Goals: NAIN    Progress made toward(s) clinical / shift goals:    Problem: Pain - Standard  Goal: Alleviation of pain or a reduction in pain to the patient’s comfort goal  Outcome: Progressing     Problem: Fall Risk  Goal: Patient will remain free from falls  Outcome: Progressing       Patient is not progressing towards the following goals:

## 2024-02-08 NOTE — THERAPY
"Occupational Therapy   Initial Evaluation     Patient Name: Mary Martinez  Age:  53 y.o., Sex:  female  Medical Record #: [id number]  Today's Date: 2/8/2024     Precautions: Fall Risk    Assessment    Patient is 53 y.o. female with complex medical history to include: RA, R hand amputations, malnutrition, SBO, SMA syndrome, pancreatitis, kidney disease, admitted with abdominal pain. Pt seen for OT eval. See grid below for details. Pt has chronic limitations due to above dx and reports variable assist needs (depending on the day). This session she was able to mobilize to/from bathroom, participate in toileting and LB dressing. Pt feels she is at baseline from OT standpoint and does not have further OT needs. Patient will not be actively followed for occupational therapy services at this time, however may be seen if requested by physician for 1 more visit within 30 days to address any discharge or equipment needs.      Plan    Occupational Therapy Initial Treatment Plan   Duration: Discharge Needs Only    DC Equipment Recommendations: None  Discharge Recommendations: Anticipate that the patient will have no further occupational therapy needs after discharge from the hospital     Subjective    \"It depends on the day.\" (independence level for all activity PTA)     Objective       02/08/24 1103   Prior Living Situation   Prior Services Intermittent Physical Support for ADL Per Family   Housing / Facility 1 Story House   Steps Into Home 1   Steps In Home 0   Bathroom Set up Bathtub / Shower Combination;Grab Bars   Equipment Owned 4-Wheel Walker;Single Point Cane;Wheelchair;Grab Bar(s) In Tub / Shower;Raised Toilet Seat Without Arms   Lives with - Patient's Self Care Capacity Child Less than 18 Years of Age;Adult Children   Comments Pt lives with children (14, 18, 21); 21 y.o. has Down's syndrome, but is high functioning. Pt's kids assist with I-ADL and intermittently with ADL. Pt reports having spouse who does not " live with them and only helps financially.   Prior Level of ADL Function   Self Feeding Independent   Grooming / Hygiene Independent   Bathing Independent  (pt takes baths)   Dressing Requires Assist   Toileting Independent   Comments Pt completes BADL with modified approaches   Prior Level of IADL Function   Medication Management Independent   Laundry Requires Assist   Finances Independent   Home Management Requires Assist   Shopping Requires Assist   Prior Level Of Mobility Independent With Device in Home  (uses 4WW intermittently; limited community distances)   Occupation (Pre-Hospital Vocational) Retired Due To Disability   Precautions   Precautions Fall Risk   Vitals   O2 (LPM) 0   O2 Delivery Device None - Room Air   Pain   Pain Scales 0 to 10 Scale    Pain 0 - 10 Group   Location Abdomen   Therapist Pain Assessment Post Activity Pain Same as Prior to Activity;Nurse Notified  (minimal c/o; not rated)   Cognition    Cognition / Consciousness WDL   Level of Consciousness Alert   Comments pleasant & cooperative   Active ROM Upper Body   Active ROM Upper Body  X   Dominant Hand Right   Comments joint deformities to L hand consistent with RA; R complete amputations to digits 2-5, thumb intact   Strength Upper Body   Upper Body Strength  X   Gross Strength Generalized Weakness, Equal Bilaterally.    Comments consistent with RA; R hand limited by amputations   Sensation Upper Body   Upper Extremity Sensation  WDL   Upper Body Muscle Tone   Upper Body Muscle Tone  WDL   Coordination Upper Body   Coordination X   Comments L hand mildy limited by RA deformities, R hand with scissor pinch only using thumb (absent digits)   Balance Assessment   Sitting Balance (Static) Fair +   Sitting Balance (Dynamic) Fair   Standing Balance (Static) Fair -   Standing Balance (Dynamic) Fair -   Weight Shift Sitting Good   Weight Shift Standing Fair   Comments FWW for standing   Bed Mobility    Supine to Sit Supervised   Sit to Supine    (up to chair post)   Scooting Supervised  (seated)   ADL Assessment   Grooming   (declined)   Lower Body Dressing Minimal Assist  (doff/don B socks in tailor sit)   Toileting Contact Guard Assist  (urination on toilet)   Functional Mobility   Sit to Stand Contact Guard Assist   Bed, Chair, Wheelchair Transfer Contact Guard Assist   Toilet Transfers Contact Guard Assist   Transfer Method Stand Step  (FWW)   Mobility Supine > EOB, short gait and transfers with FWW   Distance (Feet) 10   # of Times Distance was Traveled 2   Visual Perception   Visual Perception  Not Tested   Edema / Skin Assessment   Edema / Skin  Not Assessed   Activity Tolerance   Sitting in Chair >10 min; up post   Sitting Edge of Bed 4 min   Standing 1-2 min total   Education Group   Education Provided Role of Occupational Therapist   Role of Occupational Therapist Patient Response Patient;Acceptance;Explanation;Verbal Demonstration

## 2024-02-09 VITALS
TEMPERATURE: 97.4 F | BODY MASS INDEX: 15.82 KG/M2 | WEIGHT: 89.29 LBS | OXYGEN SATURATION: 97 % | SYSTOLIC BLOOD PRESSURE: 113 MMHG | RESPIRATION RATE: 17 BRPM | DIASTOLIC BLOOD PRESSURE: 76 MMHG | HEART RATE: 86 BPM | HEIGHT: 63 IN

## 2024-02-09 PROCEDURE — 700102 HCHG RX REV CODE 250 W/ 637 OVERRIDE(OP)

## 2024-02-09 PROCEDURE — A9270 NON-COVERED ITEM OR SERVICE: HCPCS

## 2024-02-09 PROCEDURE — 700105 HCHG RX REV CODE 258

## 2024-02-09 PROCEDURE — 700111 HCHG RX REV CODE 636 W/ 250 OVERRIDE (IP)

## 2024-02-09 PROCEDURE — 99238 HOSP IP/OBS DSCHRG MGMT 30/<: CPT | Mod: GC | Performed by: FAMILY MEDICINE

## 2024-02-09 RX ADMIN — OXYCODONE HYDROCHLORIDE 10 MG: 10 TABLET ORAL at 02:13

## 2024-02-09 RX ADMIN — OMEPRAZOLE 40 MG: 20 CAPSULE, DELAYED RELEASE ORAL at 04:50

## 2024-02-09 RX ADMIN — HYDROMORPHONE HYDROCHLORIDE 0.25 MG: 1 INJECTION, SOLUTION INTRAMUSCULAR; INTRAVENOUS; SUBCUTANEOUS at 08:53

## 2024-02-09 RX ADMIN — OXYCODONE HYDROCHLORIDE 10 MG: 10 TABLET ORAL at 12:16

## 2024-02-09 RX ADMIN — HYDROMORPHONE HYDROCHLORIDE 0.25 MG: 1 INJECTION, SOLUTION INTRAMUSCULAR; INTRAVENOUS; SUBCUTANEOUS at 03:15

## 2024-02-09 RX ADMIN — OXYCODONE HYDROCHLORIDE 10 MG: 10 TABLET ORAL at 07:46

## 2024-02-09 RX ADMIN — HYDROMORPHONE HYDROCHLORIDE 0.25 MG: 1 INJECTION, SOLUTION INTRAMUSCULAR; INTRAVENOUS; SUBCUTANEOUS at 13:18

## 2024-02-09 RX ADMIN — PAROXETINE HYDROCHLORIDE 30 MG: 10 TABLET, FILM COATED ORAL at 04:50

## 2024-02-09 RX ADMIN — SODIUM CHLORIDE, POTASSIUM CHLORIDE, SODIUM LACTATE AND CALCIUM CHLORIDE: 600; 310; 30; 20 INJECTION, SOLUTION INTRAVENOUS at 13:31

## 2024-02-09 ASSESSMENT — PAIN DESCRIPTION - PAIN TYPE
TYPE: ACUTE PAIN

## 2024-02-09 NOTE — CARE PLAN
The patient is Stable - Low risk of patient condition declining or worsening    Shift Goals  Clinical Goals: pain control, hydration  Patient Goals: pain control  Family Goals: not present      Problem: Pain - Standard  Goal: Alleviation of pain or a reduction in pain to the patient’s comfort goal  Outcome: Progressing     Problem: Knowledge Deficit - Standard  Goal: Patient and family/care givers will demonstrate understanding of plan of care, disease process/condition, diagnostic tests and medications  Outcome: Progressing     Problem: Skin Integrity  Goal: Skin integrity is maintained or improved  Outcome: Progressing

## 2024-02-09 NOTE — THERAPY
Speech Language Therapy Contact Note    Patient Name: Mary Martinez  Age:  53 y.o., Sex:  female  Medical Record #: [id number]  Today's Date: 2/8/2024    Discussed missed therapy with RN and MD.       02/08/24 1529   Treatment Variance   Reason For Missed Therapy Non-Medical - Other (Please Comment)   Initial Contact Note    Initial Contact Note  Order Received and Verified. Speech Therapy Evaluation NOT Completed Because Patient Does Not Require Acute Speech Therapy at this Time.   Interdisciplinary Plan of Care Collaboration   IDT Collaboration with  Nursing;Physician   Collaboration Comments Orders for cognitive-linguistic eval received/chart reviewed. Per MD, not indicated-orders for swallow eval. Cancel cognitive-linguistic eval orders. Thanks.

## 2024-02-09 NOTE — PROGRESS NOTES
"    FAMILY MEDICINE PROGRESS NOTE          PATIENT ID:  NAME:  Mary Martinez  MRN:               [id number]  YOB: 1970    Date of Admission: 2024     Attending: Ousmane Villanueva M.d.     Resident: Jorge Arenas M.D. (PGY-3)    Primary Care Physician:  West Aguilera M.D.    HPI: Mary Martinez is a 53 y.o. female on hospital day 1 with a past medical history of chronic pain syndrome, GERD, rheumatoid arthritis requiring amputation of digits of the right hand, malnutrition, small bowel obstruction, substance abuse, SMA syndrome who presented with abdominal pain over the past few days.     Interval Problem Update  : Patient states that pain is well controlled. GI consulted, evaluated patient, recommends no interventions and signed off.     SUBJECTIVE:   No acute events overnight, patient notes that pain is well controlled. Has no current concerns. Appetite improving. Nursing staff reports no concerns.     OBJECTIVE:  Temp:  [36.6 °C (97.8 °F)-37.1 °C (98.8 °F)] 36.6 °C (97.8 °F)  Pulse:  [77-85] 77  Resp:  [18-20] 20  BP: (100-119)/(61-64) 105/64  SpO2:  [95 %] 95 %      Intake/Output Summary (Last 24 hours) at 2024 1818  Last data filed at 2024 1700  Gross per 24 hour   Intake 240 ml   Output 1100 ml   Net -860 ml       PHYSICAL EXAM:  Vitals:    24 1647 24 0415 24 0707   BP: 117/70 119/61 100/62 105/64   Pulse: 86 85 85 77   Resp: 16 18 19 20   Temp: 36.2 °C (97.2 °F) 37.1 °C (98.8 °F) 36.7 °C (98 °F) 36.6 °C (97.8 °F)   TempSrc: Temporal Temporal Temporal Temporal   SpO2: 95% 95% 95% 95%   Weight: 40.5 kg (89 lb 4.6 oz)      Height: 1.6 m (5' 2.99\")      , Temp (24hrs), Av.8 °C (98.2 °F), Min:36.6 °C (97.8 °F), Max:37.1 °C (98.8 °F)  , Pulse Oximetry: 95 %, O2 (LPM): 0, O2 Delivery Device: None - Room Air    General: Cachectic female who appears older than stated age, Pt resting in NAD, pleasant and cooperative   Skin:  Pink, warm and dry.  No " "rashes  HEENT: NC/AT. PERRL. EOMI. MMM. No nasal discharge. Oropharynx nonerythematous without exudate/plaques  Neck:  Supple without lymphadenopathy or rigidity. No JVD   Lungs:  Symmetrical.  CTAB with no W/R/R or other adventitious sounds.  Good air movement   Cardiovascular:  S1/S2 RRR without M/R/G.  Abdomen:  Abdomen is soft, mild diffuse tenderness, ND. +BS. No masses noted.  Extremities:  Full range of motion. Multiple left hand RA nodules, Multiple digit amputation to the right hand. No gross other deformities noted.   Spine:  Straight without vertebral anomalies.  CNS:  Muscle tone is normal. Cranial nerves II-XII grossly intact. Neurovascularly intact.      LABS:  Recent Labs     02/07/24  1204 02/08/24  0513   WBC 8.2 6.9   RBC 3.63* 3.24*   HEMOGLOBIN 11.4* 10.3*   HEMATOCRIT 34.3* 30.9*   MCV 94.5 95.4   MCH 31.4 31.8   RDW 51.1* 51.8*   PLATELETCT 549* 461*   MPV 9.4 9.1   NEUTSPOLYS 87.40*  --    LYMPHOCYTES 8.20*  --    MONOCYTES 3.20  --    EOSINOPHILS 0.20  --    BASOPHILS 0.50  --      Recent Labs     02/07/24  1204 02/07/24 2040 02/08/24  0513   SODIUM 137  --  137   POTASSIUM 3.5*  --  3.5*   CHLORIDE 112  --  111   CO2 11*  --  13*   BUN 9  --  8   CREATININE 0.58  --  0.65   CALCIUM 8.6  --  8.7   MAGNESIUM 2.1 1.9  --    ALBUMIN 3.5  --  3.2     Estimated GFR/CRCL = CrCl cannot be calculated (Unknown ideal weight.).  Recent Labs     02/07/24  1204 02/08/24  0513   GLUCOSE 126* 85     Recent Labs     02/07/24  1204 02/08/24  0513   ASTSGOT 10* 7*   ALTSGPT 13 8   TBILIRUBIN 0.2 0.2   ALKPHOSPHAT 230* 205*   GLOBULIN 3.9* 3.6*             No results for input(s): \"INR\", \"APTT\", \"DDIMER\", \"FIBRINOGEN\" in the last 72 hours.      IMAGING:  [id number]-[id number]-3 VIEW   Final Result         No specific finding to suggest small bowel obstruction.      US-RUQ   Final Result      1.  Prior cholecystectomy.   2.  Intrahepatic and extra hepatic biliary dilation as well as pancreatic duct dilation as seen on " prior CT, concerning for distal stricture, stone or mass.   3.  Nonobstructing RIGHT kidney stones measuring 4 mm.          CULTURES:   Results       Procedure Component Value Units Date/Time    URINALYSIS,CULTURE IF INDICATED [[id number]]  (Abnormal) Collected: 02/07/24 1452    Order Status: Completed Specimen: Urine Updated: 02/07/24 1504     Color Yellow     Character Clear     Specific Gravity 1.015     Ph 6.5     Glucose Negative mg/dL      Ketones Negative mg/dL      Protein 30 mg/dL      Bilirubin Negative     Urobilinogen, Urine 0.2     Nitrite Negative     Leukocyte Esterase Negative     Occult Blood Negative     Micro Urine Req Microscopic    Narrative:      Indication for culture:->Patient WITHOUT an indwelling Sullivan  catheter in place with new onset of Dysuria, Frequency,  Urgency, and/or Suprapubic pain            MEDS:  Current Facility-Administered Medications   Medication Last Admin    omeprazole (PriLOSEC) capsule 40 mg 40 mg at 02/08/24 1708    enoxaparin (Lovenox) inj 30 mg 30 mg at 02/08/24 1715    QUEtiapine (SEROquel) tablet 100 mg 100 mg at 02/07/24 2034    PARoxetine (Paxil) tablet 30 mg 30 mg at 02/08/24 0545    lactated ringers infusion New Bag at 02/07/24 1736    labetalol (Normodyne/Trandate) injection 10 mg      senna-docusate (Pericolace Or Senokot S) 8.6-50 MG per tablet 2 Tablet 2 Tablet at 02/08/24 1708    And    polyethylene glycol/lytes (Miralax) Packet 1 Packet      acetaminophen (Tylenol) tablet 650 mg      Pharmacy Consult Request ...Pain Management Review 1 Each      ondansetron (Zofran) syringe/vial injection 4 mg      ondansetron (Zofran ODT) dispertab 4 mg      promethazine (Phenergan) tablet 12.5-25 mg      promethazine (Phenergan) suppository 12.5-25 mg      prochlorperazine (Compazine) injection 5-10 mg      oxyCODONE immediate release (Roxicodone) tablet 10 mg 10 mg at 02/08/24 1609    Or    HYDROmorphone (Dilaudid) injection 0.25 mg 0.25 mg at 02/08/24 1704        ASSESSMENT/PLAN:  Mary Martinez is a 53 y.o. female on hospital day 1 with a past medical history of chronic pain syndrome, GERD, rheumatoid arthritis requiring amputation of digits of the right hand, malnutrition, small bowel obstruction, substance abuse, SMA syndrome who presented with abdominal pain over the past few days.     Generalized abdominal pain  Assessment & Plan  Patient reports past medical history of chronic intermittent abdominal pain which she attributes to SMA syndrome.  -Patient states she does not want nasogastric decompression as she does not tolerate NG tube well and has never had decompression in the past.  -Patient currently rates 8/10 abdominal pain, generalized throughout the entire abdomen, no peritoneal signs on exam, exam not consistent with surgical abdomen.   -Given patient's weight and vulnerable status will start with low-dose Dilaudid pain control regimen and adjust as necessary  -History of dilated common bile duct seen again on right upper quadrant ultrasound today, given normal AST, ALT and total bilirubin we will continue to monitor for evidence of cholestatic or retained stone after cholecystectomy.  -Monitor electrolytes as electrolyte derangement is common in SMA syndrome, mild hypokalemia on blood work drawn from the emergency department with potassium of 3.5, K-Dur 40 mEq p.o. given  -Bowel rest, will give clear liquid diet as patient feels she can drink water at this time and has not had any associated nausea with p.o. fluid intake  -Late admit in the day, report given to night resident, Radha Villa DO, who will reevaluate patient for any necessary changes in management  -GI consulted, evaluated patient, recommends advance diet as tolerated, wean pain medications.  -GI has signed off, appreciate recommendations.      GERD (gastroesophageal reflux disease)- (present on admission)  Assessment & Plan  - Continue omeprazole 40 mg daily at this time    Common bile duct  dilation- (present on admission)  Assessment & Plan  Appears to be chronic on multiple images  See #abdominal pain plan    Chronic pain syndrome- (present on admission)  Assessment & Plan  Control pain is appropriate, see #abdominal pain plan    Hypokalemia- (present on admission)  Assessment & Plan  Mild hypokalemia with potassium of 3.5 in the emergency department  - Replete with 40 mill equivalents K-Dur p.o.    Severe protein-calorie malnutrition (HCC)- (present on admission)  Assessment & Plan  His weight 88 pounds, physical exam consistent with malnutrition and sarcopenia  -Of note patient's prior macrocytic anemia has resolved  -Nutrition consult, appreciate recommendations  -Continue to monitor         Core Measures:  Fluids: LR 73ml/hr  Lines: PIV  Abx: None  Diet: Clear liquid  PPX: Lovenox   DISPO: Likely discharge to home with HH, PT/OT.      CODE STATUS: DNAR/DNI      Disposition  Patient is not medically cleared for discharge.   Anticipate discharge to home with close outpatient follow-up.  I have placed the appropriate orders for post-discharge needs.    I have personally seen and examined the patient at bedside. I discussed the plan of care with patient and bedside RN and Ousmane Villanueva M.d..      Jorge Arenas M.D.   PGY-3  [facility]

## 2024-02-09 NOTE — DISCHARGE SUMMARY
[facility] Discharge Summary    Attending: Ousmane Villanueva M.d.  Senior Resident: Dr. Arenas    Contact Number: [phone]    CHIEF COMPLAINT ON ADMISSION  Chief Complaint   Patient presents with    Abdominal Pain    N/V       Reason for Admission  ems     Admission Date  2/7/2024    CODE STATUS  DNAR/DNI    HPI  Mary Martinez is a 53 y.o. female with a past medical history of chronic pain syndrome, GERD, rheumatoid arthritis requiring amputation of digits of the right hand, malnutrition, small bowel obstruction, substance abuse, SMA syndrome who presented with abdominal pain over the past few days.  She describes this abdominal pain as approximately 6-8 out of 10 on the pain scale and describes as similar to prior episodes of SMA syndrome.  Patient was seen in the emergency department last night for chest pain and diarrhea.  At that time had cardiac workup which was negative, dilation of the common bile duct was noted on ultrasound and noted to be chronic though slightly worse.  Patient does have a history of cholecystectomy.  [facility]  Patient notes that she has not had improvement of her pain over the past 24 hours and so to come back to the emergency department for further evaluation.  Patient notes history of chronic opioid use and states she would need pain control.  She denies any history of nasogastric decompression with prior episodes of SMA syndrome.  She notes some decreased appetite but states she feels she is able to drink water without any associated nausea or vomiting.  Patient reports no other concerns at this time.  She denies any recent chest pain, dizziness, lightheadedness, shortness of breath, fever, chills, headache, falls, trauma or injury.     ERCourse:  Patient presented to the emergency department complaining of abdominal pain.  Throughout evaluation in the emergency department patient remained afebrile, pulse between 70 to  80s, respiratory rate between 12-16, blood pressure stable 110s to 130s over 60s to 80s, pulse oxygenation stable in the mid 90s on room air.  Weight was reported to 88 pounds and patient appeared consistent with malnutrition.  Patient was treated with Benadryl, fentanyl, GI cocktail, given a 1 L bolus, metoclopramide 10 mg, 4 mg morphine with some improvement in her pain.  Patient continued to have moderate pain.  Labs showed no leukocytosis, hemoglobin 11.4, mildly elevated platelet count 549, mild hypokalemia 3.5, slightly elevated glucose at 126, CO2 of 11, urinalysis she had 30 protein, 5-10 WBCs but otherwise unremarkable.  Beta-hCG was negative.  Right upper quadrant abdominal sound shows prior cholecystectomy and some hepatic biliary dilation as well as pancreatic duct dilation as seen on prior CT.  Decision was made to admit patient for further evaluation to hospital team.    Hospital Course:   2/8: Patient states that pain is well controlled. GI consulted, evaluated patient, recommends no interventions at this time and signed off.    2/9: Patient notes that her pain is well enough controlled that she would like to go home. Discussed case with radiology who measured mesenteric artery aorta angle to be 37 degrees making SMA less likely. Discussed this with patient and will plant to discharge home with close follow up in clinic on 2/16/24. On day of discharge patient is alert and oriented x4, ambulatory and reports no concerns.     Therefore, she is discharged in good and stable condition to home with close outpatient follow-up.    The patient met 2-midnight criteria for an inpatient stay at the time of discharge.    Discharge Date  2/9/24    Physical Exam on Day of Discharge  General: Cachectic female who appears older than stated age, Pt resting in NAD asking when she will be discharged from the hospital, pleasant and cooperative   Skin:  Pink, warm and dry.  No rashes  HEENT: NC/AT. PERRL. EOMI. MMM. No  nasal discharge. Oropharynx nonerythematous without exudate/plaques  Neck:  Supple without lymphadenopathy or rigidity. No JVD   Lungs:  Symmetrical.  CTAB with no W/R/R or other adventitious sounds.  Good air movement   Cardiovascular:  S1/S2 RRR without M/R/G.  Abdomen:  Abdomen is soft, mild diffuse tenderness but improved over the past 24 hours, ND. +BS. No masses noted.  Extremities:  Full range of motion. Multiple left hand RA nodules, Multiple digit amputation to the right hand. No gross other deformities noted.   Spine:  Straight without vertebral anomalies.  CNS:  Muscle tone is normal. Cranial nerves II-XII grossly intact. Neurovascularly intact.      FOLLOW UP ITEMS POST DISCHARGE  Follow up at [facility] clinic 2/16/24    DISCHARGE DIAGNOSES  Principal Problem:    Nausea & vomiting (POA: Yes)  Active Problems:    Severe protein-calorie malnutrition (HCC) (POA: Yes)    Hypokalemia (POA: Yes)    Chronic pain syndrome (POA: Yes)    Common bile duct dilation (POA: Yes)    GERD (gastroesophageal reflux disease) (POA: Yes)    Generalized abdominal pain (POA: Unknown)    Anxiety and depression (POA: Unknown)      Overview: Will continue home medications at this time      - Paroxetine 30 mg daily      - Seroquel 100 mg nightly  Resolved Problems:    * No resolved hospital problems. *      FOLLOW UP  Future Appointments   Date Time Provider Department Center   2/16/2024  1:30 PM Petar Abbott M.D. [facility]     No follow-up provider specified.    MEDICATIONS ON DISCHARGE     Medication List        CHANGE how you take these medications        Instructions   PARoxetine 30 MG Tabs  What changed: See the new instructions.  Commonly known as: Paxil   TAKE 2 TABLETS(60 MG) BY MOUTH EVERY EVENING            CONTINUE taking these medications        Instructions   acetaminophen 325 MG Tabs  Commonly known as: Tylenol   Take 325-650 mg by mouth every 6 hours as needed. Indications: Pain  Dose: 325-650 mg      omeprazole 40 MG delayed-release capsule  Commonly known as: PriLOSEC   Take 40 mg by mouth 2 times a day.  Dose: 40 mg     oxyCODONE-acetaminophen  MG Tabs  Commonly known as: Percocet-10   Doctor's comments: Please substitute with an available or covered quantity or equivalent alternative if necessary.  Take 1 Tablet by mouth every four hours as needed for Severe Pain for up to 30 days. Indications: Pain  Dose: 1 Tablet     QUEtiapine 100 MG Tabs  Commonly known as: SEROquel   TAKE 1 TABLET BY MOUTH EVERY EVENING  Dose: 100 mg              Allergies  Allergies   Allergen Reactions    Penicillins Anaphylaxis and Hives     Tolerates cephalosporins; reports throat swelling with PCN    Aripiprazole Nausea     Spasms, shaking      Nitrous Oxide Vomiting       DIET  Orders Placed This Encounter   Procedures    Diet Order Diet: Level 4 - Pureed (cut large meds/float in applesauce, chk on pt during meals); Liquid level: Level 0 - Thin; Miscellaneous modifications: (optional): 6 Small Meals     Standing Status:   Standing     Number of Occurrences:   1     Order Specific Question:   Diet:     Answer:   Level 4 - Pureed [25]     Comments:   cut large meds/float in applesauce, chk on pt during meals     Order Specific Question:   Liquid level     Answer:   Level 0 - Thin     Order Specific Question:   Miscellaneous modifications: (optional)     Answer:   6 Small Meals [4]    Discontinue Diet Tray     Standing Status:   Standing     Number of Occurrences:   1       ACTIVITY  As tolerated.  Weight bearing as tolerated    CONSULTATIONS  Gastroenterology    PROCEDURES  None    LABORATORY  Lab Results   Component Value Date    SODIUM 137 02/08/2024    POTASSIUM 3.5 (L) 02/08/2024    CHLORIDE 111 02/08/2024    CO2 13 (L) 02/08/2024    GLUCOSE 85 02/08/2024    BUN 8 02/08/2024    CREATININE 0.65 02/08/2024    CREATININE 0.7 11/29/2008        Lab Results   Component Value Date    WBC 6.9 02/08/2024    HEMOGLOBIN 10.3 (L)  02/08/2024    HEMATOCRIT 30.9 (L) 02/08/2024    PLATELETCT 461 (H) 02/08/2024        Total time of the discharge process exceeds 30 minutes.

## 2024-02-09 NOTE — DIETARY
Nutrition Update:    Day 2 of admit.  Mary Martinez is a 53 y.o. female with admitting DX of Nausea & vomiting.  Patient being followed to optimize nutrition.    Diet now advanced to Level 4 -pureed, thin liquids, 6 small meals. RD to monitor for adequacy of PO intake.    Problem: Nutritional:  Goal: Achieve adequate nutritional intake  Description: Patient will consume >50% of meals and snacks.  Outcome: Not met    RD following

## 2024-02-09 NOTE — PROGRESS NOTES
PIV D/C'd.  Discharge instructions provided to pt.  Pt states understanding.  Pt states all questions have been answered.  Copy of discharge provided to pt.  Signed copy in chart.  Prescriptions provided to pt, copy in chart. Pt states that all personal belongings are in possession.  Pt escorted off unit with assistance from RUSS Frazier via wheelchair without incident.

## 2024-02-09 NOTE — PROGRESS NOTES
Received report, assumed pt care. Pt a&o 4, VSS, Assessment completed. Continues to have pain in the abdominal upper sternum region 7/10, managed by medication (see MAR). Complaining of rheumatoid arthritis pain exacerbation. Resting comfortably in bed with call light, bedside table in reach. No c/o at this time. Side rails up. Instructed to use call light when needing to get OOB verbalized understanding. Bed alarm on, bed in low position. Will continue to monitor.

## 2024-02-09 NOTE — PROGRESS NOTES
Assumed care of patient. Assessment completed.Pt A&Ox 4. Respirations are even and unlabored on room air. Medical pt, call light and belongings are within reach. POC updated. Pt educated on room and call light, pt verbalized understanding. Needs met.

## 2024-02-10 ENCOUNTER — HOSPITAL ENCOUNTER (EMERGENCY)
Facility: MEDICAL CENTER | Age: 54
End: 2024-02-10
Attending: EMERGENCY MEDICINE
Payer: MEDICAID

## 2024-02-10 VITALS
OXYGEN SATURATION: 95 % | WEIGHT: 89 LBS | DIASTOLIC BLOOD PRESSURE: 68 MMHG | HEART RATE: 65 BPM | HEIGHT: 63 IN | BODY MASS INDEX: 15.77 KG/M2 | SYSTOLIC BLOOD PRESSURE: 108 MMHG | TEMPERATURE: 97.4 F | RESPIRATION RATE: 12 BRPM

## 2024-02-10 DIAGNOSIS — E87.1 HYPONATREMIA: ICD-10-CM

## 2024-02-10 DIAGNOSIS — R10.13 EPIGASTRIC PAIN: ICD-10-CM

## 2024-02-10 DIAGNOSIS — R11.2 NAUSEA AND VOMITING, UNSPECIFIED VOMITING TYPE: ICD-10-CM

## 2024-02-10 LAB
ALBUMIN SERPL BCP-MCNC: 3.6 G/DL (ref 3.2–4.9)
ALBUMIN/GLOB SERPL: 0.8 G/DL
ALP SERPL-CCNC: 206 U/L (ref 30–99)
ALT SERPL-CCNC: <5 U/L (ref 2–50)
ANION GAP SERPL CALC-SCNC: 16 MMOL/L (ref 7–16)
AST SERPL-CCNC: 17 U/L (ref 12–45)
BASOPHILS # BLD AUTO: 1 % (ref 0–1.8)
BASOPHILS # BLD: 0.07 K/UL (ref 0–0.12)
BILIRUB SERPL-MCNC: 0.2 MG/DL (ref 0.1–1.5)
BUN SERPL-MCNC: 6 MG/DL (ref 8–22)
CALCIUM ALBUM COR SERPL-MCNC: 9.3 MG/DL (ref 8.5–10.5)
CALCIUM SERPL-MCNC: 9 MG/DL (ref 8.5–10.5)
CHLORIDE SERPL-SCNC: 106 MMOL/L (ref 96–112)
CO2 SERPL-SCNC: 13 MMOL/L (ref 20–33)
CREAT SERPL-MCNC: 0.62 MG/DL (ref 0.5–1.4)
EOSINOPHIL # BLD AUTO: 0.11 K/UL (ref 0–0.51)
EOSINOPHIL NFR BLD: 1.6 % (ref 0–6.9)
ERYTHROCYTE [DISTWIDTH] IN BLOOD BY AUTOMATED COUNT: 51.2 FL (ref 35.9–50)
GFR SERPLBLD CREATININE-BSD FMLA CKD-EPI: 107 ML/MIN/1.73 M 2
GLOBULIN SER CALC-MCNC: 4.3 G/DL (ref 1.9–3.5)
GLUCOSE SERPL-MCNC: 119 MG/DL (ref 65–99)
HCG SERPL QL: NEGATIVE
HCT VFR BLD AUTO: 38.5 % (ref 37–47)
HGB BLD-MCNC: 12.5 G/DL (ref 12–16)
IMM GRANULOCYTES # BLD AUTO: 0.03 K/UL (ref 0–0.11)
IMM GRANULOCYTES NFR BLD AUTO: 0.4 % (ref 0–0.9)
LIPASE SERPL-CCNC: 61 U/L (ref 11–82)
LYMPHOCYTES # BLD AUTO: 1.79 K/UL (ref 1–4.8)
LYMPHOCYTES NFR BLD: 26.4 % (ref 22–41)
MCH RBC QN AUTO: 31 PG (ref 27–33)
MCHC RBC AUTO-ENTMCNC: 32.5 G/DL (ref 32.2–35.5)
MCV RBC AUTO: 95.5 FL (ref 81.4–97.8)
MONOCYTES # BLD AUTO: 0.66 K/UL (ref 0–0.85)
MONOCYTES NFR BLD AUTO: 9.7 % (ref 0–13.4)
NEUTROPHILS # BLD AUTO: 4.11 K/UL (ref 1.82–7.42)
NEUTROPHILS NFR BLD: 60.9 % (ref 44–72)
NRBC # BLD AUTO: 0 K/UL
NRBC BLD-RTO: 0 /100 WBC (ref 0–0.2)
PLATELET # BLD AUTO: 577 K/UL (ref 164–446)
PMV BLD AUTO: 9.1 FL (ref 9–12.9)
POTASSIUM SERPL-SCNC: 3.5 MMOL/L (ref 3.6–5.5)
PROT SERPL-MCNC: 7.9 G/DL (ref 6–8.2)
RBC # BLD AUTO: 4.03 M/UL (ref 4.2–5.4)
SODIUM SERPL-SCNC: 135 MMOL/L (ref 135–145)
WBC # BLD AUTO: 6.8 K/UL (ref 4.8–10.8)

## 2024-02-10 PROCEDURE — 99284 EMERGENCY DEPT VISIT MOD MDM: CPT

## 2024-02-10 PROCEDURE — 84703 CHORIONIC GONADOTROPIN ASSAY: CPT

## 2024-02-10 PROCEDURE — 700111 HCHG RX REV CODE 636 W/ 250 OVERRIDE (IP): Mod: JZ,UD | Performed by: EMERGENCY MEDICINE

## 2024-02-10 PROCEDURE — 36415 COLL VENOUS BLD VENIPUNCTURE: CPT

## 2024-02-10 PROCEDURE — 80053 COMPREHEN METABOLIC PANEL: CPT

## 2024-02-10 PROCEDURE — 96375 TX/PRO/DX INJ NEW DRUG ADDON: CPT

## 2024-02-10 PROCEDURE — 85025 COMPLETE CBC W/AUTO DIFF WBC: CPT

## 2024-02-10 PROCEDURE — 83690 ASSAY OF LIPASE: CPT

## 2024-02-10 PROCEDURE — 96374 THER/PROPH/DIAG INJ IV PUSH: CPT

## 2024-02-10 RX ORDER — ONDANSETRON 4 MG/1
4 TABLET, ORALLY DISINTEGRATING ORAL EVERY 6 HOURS PRN
Qty: 10 TABLET | Refills: 0 | Status: SHIPPED | OUTPATIENT
Start: 2024-02-10 | End: 2024-02-16

## 2024-02-10 RX ORDER — DIPHENHYDRAMINE HYDROCHLORIDE 50 MG/ML
25 INJECTION INTRAMUSCULAR; INTRAVENOUS ONCE
Status: COMPLETED | OUTPATIENT
Start: 2024-02-10 | End: 2024-02-10

## 2024-02-10 RX ORDER — METOCLOPRAMIDE 10 MG/1
10 TABLET ORAL 4 TIMES DAILY PRN
Qty: 30 TABLET | Refills: 0 | Status: SHIPPED | OUTPATIENT
Start: 2024-02-10 | End: 2024-02-16 | Stop reason: SDUPTHER

## 2024-02-10 RX ORDER — METOCLOPRAMIDE HYDROCHLORIDE 5 MG/ML
10 INJECTION INTRAMUSCULAR; INTRAVENOUS ONCE
Status: COMPLETED | OUTPATIENT
Start: 2024-02-10 | End: 2024-02-10

## 2024-02-10 RX ORDER — ONDANSETRON 2 MG/ML
4 INJECTION INTRAMUSCULAR; INTRAVENOUS ONCE
Status: COMPLETED | OUTPATIENT
Start: 2024-02-10 | End: 2024-02-10

## 2024-02-10 RX ORDER — MORPHINE SULFATE 4 MG/ML
4 INJECTION INTRAVENOUS ONCE
Status: COMPLETED | OUTPATIENT
Start: 2024-02-10 | End: 2024-02-10

## 2024-02-10 RX ADMIN — MORPHINE SULFATE 4 MG: 4 INJECTION, SOLUTION INTRAMUSCULAR; INTRAVENOUS at 17:27

## 2024-02-10 RX ADMIN — DIPHENHYDRAMINE HYDROCHLORIDE 25 MG: 50 INJECTION, SOLUTION INTRAMUSCULAR; INTRAVENOUS at 17:24

## 2024-02-10 RX ADMIN — ONDANSETRON 4 MG: 2 INJECTION INTRAMUSCULAR; INTRAVENOUS at 17:23

## 2024-02-10 RX ADMIN — METOCLOPRAMIDE 10 MG: 5 INJECTION, SOLUTION INTRAMUSCULAR; INTRAVENOUS at 17:29

## 2024-02-10 ASSESSMENT — FIBROSIS 4 INDEX: FIB4 SCORE: 0.27

## 2024-02-10 ASSESSMENT — PAIN SCALES - WONG BAKER: WONGBAKER_NUMERICALRESPONSE: DOESN'T HURT AT ALL

## 2024-02-10 NOTE — ED TRIAGE NOTES
Mary Martinez  53 y.o. female  Chief Complaint   Patient presents with    Abdominal Pain    N/V     Came to ED on 2/7, was admitted and discharged yesterday 2/9. Pt reports non stop vomiting starting overnight.     Diarrhea       Pt to triage via wheelchair.   Pt is alert and oriented, speaking in full sentences, follows commands and responds appropriately to questions. Not in any apparent distress. Respirations are even and unlabored.  Pt placed in lobby. Pt educated on triage process. Pt encouraged to alert staff for any changes.

## 2024-02-11 NOTE — ED PROVIDER NOTES
ER Provider Note    Scribed for Dr. Ousmane Zayas M.D. by Benjy Sebastian. 2/10/2024  4:46 PM    Primary Care Provider: West Aguilera M.D.    CHIEF COMPLAINT  Chief Complaint   Patient presents with    Abdominal Pain    N/V     Came to ED on 2/7, was admitted and discharged yesterday 2/9. Pt reports non stop vomiting starting overnight.     Diarrhea       EXTERNAL RECORDS REVIEWED  Inpatient Notes Patient was admitted and discharged yesterday for abdominal pain, nausea, vomiting, and diarrhea.       HPI/ROS  LIMITATION TO HISTORY   Select: : None    OUTSIDE HISTORIAN(S):  None.     Mary Martinez is a 53 y.o. female who presents to the ED for evaluation of abdominal pain onset last night. Patient was discharged home form the hospital yesterday, and was still in moderate pain upon discharge. She notes having imaging last performed on her abdomen two days ago. After going home last night, patient woke up in the middle of the night with with vomiting, and returning severe upper abdominal pain. She has associated nausea and diarrhea. Patient presents todayhoping for symptomatic releif, and notes that reglan and zofran have been beneificial for symptomatic control at prior visits. She denies any drug or alcohol use, and recently quit smoking tobacco. Patient has an abdominal surgical history of stomach ulcer repair in 2019, 4 C-sections, pancreatectomy, and cholecystectomy. She has an additional surgical history of removal of her right fingers, resulting form Rheumatoid arthritis. She takes pain medication for her arthritis, and is currently trying to get an auto injector for easier administration.     PAST MEDICAL HISTORY  Past Medical History:   Diagnosis Date    Acute renal failure (ARF) (HCC)     Acute renal failure (HCC) 10/06/2011    Allergy     Anemia     Anxiety     Arthritis     Rheumatoid -follows with rheumatologist. Has several fractures due to RA.    ASTHMA     inhalers prn    Blood transfusion  without reported diagnosis     Bowel habit changes     4/24/20-constipation and diarrhea.    Bronchitis last approx 2018    Chronic pain 04/24/2020    Due to RA    Dental disorder     no teeth upper-does not wear her denture. Broken teeth on bottom.    Depression     GERD (gastroesophageal reflux disease)     GIB (gastrointestinal bleeding)     Head ache     Heart burn     taking famotidine    Hiatal hernia     Hiatus hernia syndrome     History of pancreatitis     Hypokalemia     Hyponatremia     Idiopathic chronic pancreatitis (HCC)     Indigestion     taking famotidine    Intractable nausea and vomiting     Kidney disease     Leukocytosis 10/08/2011    Pain     CPS-everywhere    Pneumonia 10/2019    PONV (postoperative nausea and vomiting)     Psychiatric problem     anxiety and depression    Rheumatoid aortitis     Rheumatoid arthritis(714.0) 2003    Severe protein-calorie malnutrition (HCC)     Small bowel obstruction (HCC) 10/06/2011    SMAS (superior mesenteric artery syndrome) (HCC)     Substance abuse (HCC)     UTI (urinary tract infection) 05/25/2023    Vitamin B12 deficiency neuropathy ([facility])        SURGICAL HISTORY  Past Surgical History:   Procedure Laterality Date    CA UPPER GI ENDOSCOPY,DIAGNOSIS N/A 8/16/2023    Procedure: GASTROSCOPY;  Surgeon: Blossom Peres M.D.;  Location: SURGERY SAME DAY [facility];  Service: Gastroenterology    CA PLACE PERCUT GASTROSTOMY TUBE N/A 6/12/2023    Procedure: INSERTION, PEG TUBE - PEG AND J TUBE PLACEMENT;  Surgeon: Marilyn Anderson M.D.;  Location: SURGERY SAME DAY [facility];  Service: Gastroenterology    CA UPPER GI ENDOSCOPY,DIAGNOSIS  6/12/2023    Procedure: GASTROSCOPY;  Surgeon: Marilyn Anderson M.D.;  Location: SURGERY SAME DAY [facility];  Service: Gastroenterology    CA UPPER GI ENDOSCOPY,DIAGNOSIS N/A 9/9/2022    Procedure: ENDOSCOPIC ULTRASOUND- UPPER;  Surgeon: Jorge Brooke M.D.;  Location: SURGERY [facility];  Service: EUS    EGD  W/ENDOSCOPIC ULTRASOUND N/A 9/9/2022    Procedure: EGD, WITH ENDOSCOPIC US;  Surgeon: Jorge Brooke M.D.;  Location: [facility];  Service: EUS    VT ENDOSCOPIC US EXAM, ESOPH  4/29/2020    Procedure: EGD, WITH ENDOSCOPIC US;  Surgeon: Jorge Brooke M.D.;  Location: [facility];  Service: Gastroenterology    GASTROSCOPY-ENDO  4/29/2020    Procedure: GASTROSCOPY;  Surgeon: Jorge Brooke M.D.;  Location: [facility];  Service: Gastroenterology    EGD WITH ASP/BX  4/29/2020    Procedure: EGD, WITH ASPIRATION BIOPSY - POSS FNA;  Surgeon: Jorge Brooke M.D.;  Location: [facility];  Service: Gastroenterology    VT EXPLORATORY OF ABDOMEN N/A 10/4/2019    Procedure: LAPAROTOMY, EXPLORATORY AND REPAIR OF DUODENAL PERFORATION;  Surgeon: James Dumont M.D.;  Location: [facility];  Service: General    COLONOSCOPY  2018    with upper endoscopy    FINGER ARTHROPLASTY Right 6/5/2017    Procedure: FINGER ARTHROPLASTY - LONG, RING AND SMALL VOLAR PLATE;  Surgeon: Lobo Rosen M.D.;  Location: SURGERY SAME DAY [facility];  Service:     FINGER AMPUTATION  6/5/2017    Procedure: FINGER AMPUTATION - LONG, RING AND SMALL AT THE PROXIMAL INTERPHALANGEAL JOINT;  Surgeon: Lobo Rosen M.D.;  Location: SURGERY SAME DAY [facility];  Service:     FINGER ARTHROPLASTY Right 4/17/2017    Procedure: FINGER ARTHROPLASTY ;  Surgeon: Lobo Rosen M.D.;  Location: SURGERY SAME DAY [facility];  Service:     FINGER AMPUTATION Right 9/14/2016    Procedure: FINGER AMPUTATION INDEX;  Surgeon: Lobo Rosen M.D.;  Location: SURGERY SAME DAY [facility];  Service:     IRRIGATION & DEBRIDEMENT ORTHO Right 9/11/2016    Procedure: IRRIGATION & DEBRIDEMENT ORTHO - right index finger;  Surgeon: Madhu Rosen M.D.;  Location: [facility];  Service:     FUSION, PIP JOINT, TOE Right 8/29/2016    Procedure:  RE-DO INDEX FINGER PROXIMAL INTERPHALANGEAL ARTHRODESIS;  Surgeon: Lobo Rosen M.D.;  Location: SURGERY SAME DAY [facility];  Service:     BONE GRAFT Right 8/29/2016    Procedure: BONE GRAFT - DISTAL RADIUS ;  Surgeon: Lobo Rosen M.D.;  Location: SURGERY SAME DAY [facility];  Service:     ARTHRODESIS Right 5/6/2016    Procedure: ARTHRODESIS INDEX FINGER PROXIMAL INTERPHALANGEAL;  Surgeon: Lobo Rosen M.D.;  Location: SURGERY SAME DAY [facility];  Service:     FOOT RECONSTRUCTION RHEUMATIC Left 7/29/2015    Procedure: FOOT RECONSTRUCTION RHEUMATIC;  Surgeon: Heriberto Alves M.D.;  Location: SURGERY [facility];  Service:     TOE FUSION Right 5/27/2015    Procedure: TOE FUSION 1ST METATARSALPHALANGEAL JOINT;  Surgeon: Heriberto Alves M.D.;  Location: SURGERY [facility];  Service:     BONE SPUR EXCISION  5/27/2015    Procedure: BONE SPUR EXCISION METATARSAL HEAD 2-3;  Surgeon: Heriberto Alves M.D.;  Location: SURGERY [facility];  Service:     HAMMERTOE CORRECTION  5/27/2015    Procedure: HAMMERTOE CORRECTION AND SOFT TISSUE RE-ALINGMENT 2-3 ;  Surgeon: Heriberto Alves M.D.;  Location: SURGERY [facility];  Service:     DENTAL EXTRACTION(S)  2014    all of upper teeth    ABDOMINAL ABSCESS DRAINAGE  9/27/2011    Performed by VERO WRIGHT at SURGERY [facility]    EMANUEL BY LAPAROSCOPY  9/27/2011    Performed by VERO WRIGHT at SURGERY [facility]    APPENDECTOMY  2011    Found out it had ruptured prior to abcess surgery    REPEAT C SECTION  2008    REPEAT C SECTION  2005    REPEAT C SECTION  1999    PRIMARY C SECTION  1991    TONSILLECTOMY  1982    WRIST EXPLORATION Left 1980's    fractured wrist-no hardware       FAMILY HISTORY  Family History   Problem Relation Age of Onset    Cancer Mother     Heart Disease Mother     Hypertension Mother     Heart Disease Father     Hypertension Father        SOCIAL HISTORY   reports that she quit smoking about 2  "months ago. Her smoking use included cigarettes. She has a 15.0 pack-year smoking history. She has never been exposed to tobacco smoke. She has never used smokeless tobacco. She reports that she does not drink alcohol and does not use drugs.    CURRENT MEDICATIONS  Discharge Medication List as of 2/10/2024  6:19 PM        CONTINUE these medications which have NOT CHANGED    Details   omeprazole (PRILOSEC) 40 MG delayed-release capsule Take 40 mg by mouth 2 times a day., Historical Med      QUEtiapine (SEROQUEL) 100 MG Tab TAKE 1 TABLET BY MOUTH EVERY EVENING, Disp-90 Tablet, R-0, Normal      PARoxetine (PAXIL) 30 MG Tab TAKE 2 TABLETS(60 MG) BY MOUTH EVERY EVENING, Disp-180 Tablet, R-0, Normal      oxyCODONE-acetaminophen (PERCOCET-10)  MG Tab Take 1 Tablet by mouth every four hours as needed for Severe Pain for up to 30 days. Indications: PainPlease substitute with an available or covered quantity or equivalent alternative if necessary.Disp-120 Tablet, R-0, Normal      acetaminophen (TYLENOL) 325 MG Tab Take 325-650 mg by mouth every 6 hours as needed. Indications: Pain, Historical Med             ALLERGIES  Penicillins, Aripiprazole, and Nitrous oxide    PHYSICAL EXAM  /74   Pulse 99   Temp 37.3 °C (99.1 °F) (Temporal)   Resp 14   Ht 1.6 m (5' 3\")   Wt 40.4 kg (89 lb)   LMP 09/21/2011   SpO2 98%   BMI 15.77 kg/m²   Constitutional: Alert in no apparent distress.  HENT: No signs of trauma, Bilateral external ears normal, Nose normal. Dry mucus membranes.   Eyes: Pupils are equal and reactive, Conjunctiva normal, Non-icteric.   Neck: Normal range of motion, No tenderness, Supple,   Cardiovascular: Regular rate and rhythm, no murmurs.   Thorax & Lungs: Normal breath sounds, No respiratory distress, No wheezing, No chest tenderness.   Abdomen: Bowel sounds normal, Soft, Tenderness to the epigastric region, No masses, No pulsatile masses. No peritoneal signs.  Skin: Warm, Dry, No erythema, No rash. "   Back: No bony tenderness, No CVA tenderness.   Extremities: No edema, No tenderness, No cyanosis, no tenderness  Psychiatric: Affect normal     DIAGNOSTIC STUDIES & PROCEDURES    Labs:   Labs Reviewed   CBC WITH DIFFERENTIAL - Abnormal; Notable for the following components:       Result Value    RBC 4.03 (*)     RDW 51.2 (*)     Platelet Count 577 (*)     All other components within normal limits   COMP METABOLIC PANEL - Abnormal; Notable for the following components:    Potassium 3.5 (*)     Co2 13 (*)     Glucose 119 (*)     Bun 6 (*)     Alkaline Phosphatase 206 (*)     Globulin 4.3 (*)     All other components within normal limits   LIPASE   HCG QUAL SERUM   ESTIMATED GFR   URINALYSIS,CULTURE IF INDICATED      All labs reviewed by me.      COURSE & MEDICAL DECISION MAKING    ED Observation Status? No; Patient does not meet criteria for ED Observation.     INITIAL ASSESSMENT AND PLAN  Care Narrative:       4:46 PM - Patient seen and evaluated at bedside. Patient presents today after admission and discharge yesterday, for ongoing upper abdominal pain with nausea and vomiting. She reports wanting symptomatic relief at this time, and I informed her of my plan to administer medication to make her more comfortable while in the ED. Patient agrees to plan of care. Patient will be treated with Benadryl 25 mg, Reglan 10 mg, Zofran 4 mg, and Morphine 4 mg for her symptoms. Ordered UA culture if indicated, HCG qual serum, Lipase, CMP, and CBC with differential to evaluate.    6:46 PM - I reevaluated the patient at bedside. The patient informs me they feel improved following medication administration. I explained that the patient's diagnostic workup was not concerning for any new, non chronic abnormalities at this time. I discussed plan for discharge and follow up as outlined below. The patient is stable for discharge at this time and will return for any new or worsening symptoms. Patient verbalizes understanding and  support with my plan for discharge.          ADDITIONAL PROBLEM LIST AND DISPOSITION    Patient with recent abdominal imaging that was negative for surgical disease as well as nausea and vomiting and recent admission for this now complaining of abdominal pain.  Ultimate the patient has no leukocytosis and no significant electrolyte derangement.  Lipase is normal.  The patient is not pregnant.  She is feeling improved after interventions with pain medication as well as antiemetics.  I will send her home with antiemetics and strict return precautions as well as follow-up.             DISPOSITION AND DISCUSSIONS  I have discussed management of the patient with the following physicians and HALIMA's: None.     Discussion of management with other [facility] or appropriate source(s): None     Escalation of care considered, and ultimately not performed: acute inpatient care management, however at this time, the patient is most appropriate for outpatient management.    Barriers to care at this time, including but not limited to:  None are known at this time .     Decision tools and prescription drugs considered including, but not limited to:  Antiemetics given. .    The patient will return for new or worsening symptoms and is stable at the time of discharge.    DISPOSITION:  Patient will be discharged home in stable condition.    FOLLOW UP:  West Aguilera M.D.  [address] [facility] [address]  [phone]    In 2 days        OUTPATIENT MEDICATIONS:  Discharge Medication List as of 2/10/2024  6:19 PM        START taking these medications    Details   ondansetron (ZOFRAN ODT) 4 MG TABLET DISPERSIBLE Take 1 Tablet by mouth every 6 hours as needed for Nausea/Vomiting., Disp-10 Tablet, R-0, Normal      metoclopramide (REGLAN) 10 MG Tab Take 1 Tablet by mouth 4 times a day as needed (nausea and vomitng)., Disp-30 Tablet, R-0, Normal              FINAL IMPRESSION   1. Nausea and vomiting, unspecified vomiting type    2. Epigastric  pain    3. Hyponatremia         Benjy MOLINA (Scribe), am scribing for, and in the presence of, Ousmane Zayas M.D..    Electronically signed by: Benjy Sebastian (Ruby), 2/10/2024    Ousmane MOLINA M.D. personally performed the services described in this documentation, as scribed by Benjy Sebastian in my presence, and it is both accurate and complete.    The note accurately reflects work and decisions made by me.  Ousmane Zayas M.D.  2/10/2024  7:23 PM

## 2024-02-11 NOTE — ED NOTES
"Pt discharged home with family. The pt is alert and oriented, calm and cooperative, talks with clear coherent speech. IV discontinued and gauze placed, pt in possession of belongings. Pt provided discharge education and information pertaining to medications and follow up appointments. Pt received copy of discharge instructions and verbalized understanding. /68   Pulse 65   Temp 36.3 °C (97.4 °F) (Temporal)   Resp 12   Ht 1.6 m (5' 3\")   Wt 40.4 kg (89 lb)   LMP 09/21/2011   SpO2 95%   BMI 15.77 kg/m²     "

## 2024-02-11 NOTE — ED NOTES
"Pt discharged home with . The pt is alert and oriented, calm and cooperative, talks with clear coherent speech, ambulates with assistance, and moves extremities. The pt reports a pain reduction after medicating per mar. Vitals stable on the monitor. IV discontinued and gauze placed, pt in possession of belongings. Pt provided discharge education and information pertaining to medications and follow up appointments. Pt received copy of discharge instructions and verbalized understanding. /68   Pulse 65   Temp 36.3 °C (97.4 °F) (Temporal)   Resp 12   Ht 1.6 m (5' 3\")   Wt 40.4 kg (89 lb)   LMP 09/21/2011   SpO2 95%   BMI 15.77 kg/m²     "

## 2024-02-12 ENCOUNTER — APPOINTMENT (OUTPATIENT)
Dept: RADIOLOGY | Facility: MEDICAL CENTER | Age: 54
DRG: 640 | End: 2024-02-12
Attending: EMERGENCY MEDICINE
Payer: MEDICAID

## 2024-02-12 ENCOUNTER — HOSPITAL ENCOUNTER (INPATIENT)
Facility: MEDICAL CENTER | Age: 54
LOS: 2 days | DRG: 640 | End: 2024-02-14
Attending: EMERGENCY MEDICINE | Admitting: STUDENT IN AN ORGANIZED HEALTH CARE EDUCATION/TRAINING PROGRAM
Payer: MEDICAID

## 2024-02-12 DIAGNOSIS — R11.2 NAUSEA AND VOMITING, UNSPECIFIED VOMITING TYPE: ICD-10-CM

## 2024-02-12 DIAGNOSIS — E86.0 DEHYDRATION: ICD-10-CM

## 2024-02-12 LAB
ALBUMIN SERPL BCP-MCNC: 3.9 G/DL (ref 3.2–4.9)
ALBUMIN/GLOB SERPL: 1 G/DL
ALP SERPL-CCNC: 218 U/L (ref 30–99)
ALT SERPL-CCNC: 10 U/L (ref 2–50)
ANION GAP SERPL CALC-SCNC: 10 MMOL/L (ref 7–16)
ANION GAP SERPL CALC-SCNC: 18 MMOL/L (ref 7–16)
APPEARANCE UR: CLEAR
AST SERPL-CCNC: 7 U/L (ref 12–45)
BACTERIA #/AREA URNS HPF: NEGATIVE /HPF
BASOPHILS # BLD AUTO: 0.9 % (ref 0–1.8)
BASOPHILS # BLD: 0.11 K/UL (ref 0–0.12)
BILIRUB SERPL-MCNC: 0.3 MG/DL (ref 0.1–1.5)
BILIRUB UR QL STRIP.AUTO: NEGATIVE
BUN SERPL-MCNC: 14 MG/DL (ref 8–22)
BUN SERPL-MCNC: 16 MG/DL (ref 8–22)
CALCIUM ALBUM COR SERPL-MCNC: 8.6 MG/DL (ref 8.5–10.5)
CALCIUM SERPL-MCNC: 8.1 MG/DL (ref 8.5–10.5)
CALCIUM SERPL-MCNC: 8.5 MG/DL (ref 8.5–10.5)
CHLORIDE SERPL-SCNC: 106 MMOL/L (ref 96–112)
CHLORIDE SERPL-SCNC: 108 MMOL/L (ref 96–112)
CO2 SERPL-SCNC: 14 MMOL/L (ref 20–33)
CO2 SERPL-SCNC: 16 MMOL/L (ref 20–33)
COLOR UR: YELLOW
CREAT SERPL-MCNC: 0.82 MG/DL (ref 0.5–1.4)
CREAT SERPL-MCNC: 0.92 MG/DL (ref 0.5–1.4)
EKG IMPRESSION: NORMAL
EOSINOPHIL # BLD AUTO: 0.12 K/UL (ref 0–0.51)
EOSINOPHIL NFR BLD: 0.9 % (ref 0–6.9)
EPI CELLS #/AREA URNS HPF: ABNORMAL /HPF
ERYTHROCYTE [DISTWIDTH] IN BLOOD BY AUTOMATED COUNT: 51.9 FL (ref 35.9–50)
GFR SERPLBLD CREATININE-BSD FMLA CKD-EPI: 75 ML/MIN/1.73 M 2
GFR SERPLBLD CREATININE-BSD FMLA CKD-EPI: 86 ML/MIN/1.73 M 2
GLOBULIN SER CALC-MCNC: 3.9 G/DL (ref 1.9–3.5)
GLUCOSE SERPL-MCNC: 102 MG/DL (ref 65–99)
GLUCOSE SERPL-MCNC: 117 MG/DL (ref 65–99)
GLUCOSE UR STRIP.AUTO-MCNC: NEGATIVE MG/DL
HCT VFR BLD AUTO: 42.1 % (ref 37–47)
HGB BLD-MCNC: 13.8 G/DL (ref 12–16)
HYALINE CASTS #/AREA URNS LPF: ABNORMAL /LPF
IMM GRANULOCYTES # BLD AUTO: 0.04 K/UL (ref 0–0.11)
IMM GRANULOCYTES NFR BLD AUTO: 0.3 % (ref 0–0.9)
KETONES UR STRIP.AUTO-MCNC: NEGATIVE MG/DL
LEUKOCYTE ESTERASE UR QL STRIP.AUTO: NEGATIVE
LIPASE SERPL-CCNC: 29 U/L (ref 11–82)
LYMPHOCYTES # BLD AUTO: 1.63 K/UL (ref 1–4.8)
LYMPHOCYTES NFR BLD: 12.8 % (ref 22–41)
MAGNESIUM SERPL-MCNC: 2.2 MG/DL (ref 1.5–2.5)
MCH RBC QN AUTO: 30.8 PG (ref 27–33)
MCHC RBC AUTO-ENTMCNC: 32.8 G/DL (ref 32.2–35.5)
MCV RBC AUTO: 94 FL (ref 81.4–97.8)
MICRO URNS: ABNORMAL
MONOCYTES # BLD AUTO: 0.81 K/UL (ref 0–0.85)
MONOCYTES NFR BLD AUTO: 6.4 % (ref 0–13.4)
NEUTROPHILS # BLD AUTO: 10.04 K/UL (ref 1.82–7.42)
NEUTROPHILS NFR BLD: 78.7 % (ref 44–72)
NITRITE UR QL STRIP.AUTO: NEGATIVE
NRBC # BLD AUTO: 0 K/UL
NRBC BLD-RTO: 0 /100 WBC (ref 0–0.2)
PH UR STRIP.AUTO: 7 [PH] (ref 5–8)
PLATELET # BLD AUTO: 556 K/UL (ref 164–446)
PMV BLD AUTO: 9.4 FL (ref 9–12.9)
POTASSIUM SERPL-SCNC: 3.3 MMOL/L (ref 3.6–5.5)
POTASSIUM SERPL-SCNC: 4.3 MMOL/L (ref 3.6–5.5)
PROT SERPL-MCNC: 7.8 G/DL (ref 6–8.2)
PROT UR QL STRIP: 30 MG/DL
RBC # BLD AUTO: 4.48 M/UL (ref 4.2–5.4)
RBC # URNS HPF: ABNORMAL /HPF
RBC UR QL AUTO: NEGATIVE
SODIUM SERPL-SCNC: 134 MMOL/L (ref 135–145)
SODIUM SERPL-SCNC: 138 MMOL/L (ref 135–145)
SP GR UR STRIP.AUTO: 1.02
UROBILINOGEN UR STRIP.AUTO-MCNC: 0.2 MG/DL
WBC # BLD AUTO: 12.8 K/UL (ref 4.8–10.8)
WBC #/AREA URNS HPF: ABNORMAL /HPF

## 2024-02-12 PROCEDURE — 700102 HCHG RX REV CODE 250 W/ 637 OVERRIDE(OP)

## 2024-02-12 PROCEDURE — 99222 1ST HOSP IP/OBS MODERATE 55: CPT | Mod: GC | Performed by: STUDENT IN AN ORGANIZED HEALTH CARE EDUCATION/TRAINING PROGRAM

## 2024-02-12 PROCEDURE — 83735 ASSAY OF MAGNESIUM: CPT

## 2024-02-12 PROCEDURE — A9270 NON-COVERED ITEM OR SERVICE: HCPCS

## 2024-02-12 PROCEDURE — 96375 TX/PRO/DX INJ NEW DRUG ADDON: CPT

## 2024-02-12 PROCEDURE — 81001 URINALYSIS AUTO W/SCOPE: CPT

## 2024-02-12 PROCEDURE — 93005 ELECTROCARDIOGRAM TRACING: CPT | Performed by: EMERGENCY MEDICINE

## 2024-02-12 PROCEDURE — 85025 COMPLETE CBC W/AUTO DIFF WBC: CPT

## 2024-02-12 PROCEDURE — 96376 TX/PRO/DX INJ SAME DRUG ADON: CPT

## 2024-02-12 PROCEDURE — 700111 HCHG RX REV CODE 636 W/ 250 OVERRIDE (IP): Mod: JZ | Performed by: EMERGENCY MEDICINE

## 2024-02-12 PROCEDURE — 80048 BASIC METABOLIC PNL TOTAL CA: CPT

## 2024-02-12 PROCEDURE — 700111 HCHG RX REV CODE 636 W/ 250 OVERRIDE (IP)

## 2024-02-12 PROCEDURE — 36415 COLL VENOUS BLD VENIPUNCTURE: CPT

## 2024-02-12 PROCEDURE — 74022 RADEX COMPL AQT ABD SERIES: CPT

## 2024-02-12 PROCEDURE — 700105 HCHG RX REV CODE 258: Mod: UD | Performed by: EMERGENCY MEDICINE

## 2024-02-12 PROCEDURE — 83690 ASSAY OF LIPASE: CPT

## 2024-02-12 PROCEDURE — 90471 IMMUNIZATION ADMIN: CPT

## 2024-02-12 PROCEDURE — 770006 HCHG ROOM/CARE - MED/SURG/GYN SEMI*

## 2024-02-12 PROCEDURE — 93005 ELECTROCARDIOGRAM TRACING: CPT

## 2024-02-12 PROCEDURE — 99285 EMERGENCY DEPT VISIT HI MDM: CPT

## 2024-02-12 PROCEDURE — 700111 HCHG RX REV CODE 636 W/ 250 OVERRIDE (IP): Performed by: STUDENT IN AN ORGANIZED HEALTH CARE EDUCATION/TRAINING PROGRAM

## 2024-02-12 PROCEDURE — 80053 COMPREHEN METABOLIC PANEL: CPT

## 2024-02-12 PROCEDURE — 90686 IIV4 VACC NO PRSV 0.5 ML IM: CPT | Performed by: STUDENT IN AN ORGANIZED HEALTH CARE EDUCATION/TRAINING PROGRAM

## 2024-02-12 PROCEDURE — 96374 THER/PROPH/DIAG INJ IV PUSH: CPT

## 2024-02-12 PROCEDURE — 700105 HCHG RX REV CODE 258

## 2024-02-12 RX ORDER — SODIUM CHLORIDE, SODIUM LACTATE, POTASSIUM CHLORIDE, CALCIUM CHLORIDE 600; 310; 30; 20 MG/100ML; MG/100ML; MG/100ML; MG/100ML
1000 INJECTION, SOLUTION INTRAVENOUS ONCE
Status: COMPLETED | OUTPATIENT
Start: 2024-02-12 | End: 2024-02-12

## 2024-02-12 RX ORDER — AMOXICILLIN 250 MG
2 CAPSULE ORAL EVERY EVENING
Status: DISCONTINUED | OUTPATIENT
Start: 2024-02-12 | End: 2024-02-14 | Stop reason: HOSPADM

## 2024-02-12 RX ORDER — ONDANSETRON 4 MG/1
4 TABLET, ORALLY DISINTEGRATING ORAL EVERY 4 HOURS PRN
Status: DISCONTINUED | OUTPATIENT
Start: 2024-02-12 | End: 2024-02-14 | Stop reason: HOSPADM

## 2024-02-12 RX ORDER — SODIUM CHLORIDE, SODIUM LACTATE, POTASSIUM CHLORIDE, CALCIUM CHLORIDE 600; 310; 30; 20 MG/100ML; MG/100ML; MG/100ML; MG/100ML
INJECTION, SOLUTION INTRAVENOUS CONTINUOUS
Status: DISCONTINUED | OUTPATIENT
Start: 2024-02-12 | End: 2024-02-13

## 2024-02-12 RX ORDER — ACETAMINOPHEN 500 MG
1000 TABLET ORAL EVERY 6 HOURS PRN
Status: DISCONTINUED | OUTPATIENT
Start: 2024-02-12 | End: 2024-02-14 | Stop reason: HOSPADM

## 2024-02-12 RX ORDER — POTASSIUM CHLORIDE 7.45 MG/ML
10 INJECTION INTRAVENOUS
Status: COMPLETED | OUTPATIENT
Start: 2024-02-12 | End: 2024-02-12

## 2024-02-12 RX ORDER — PROMETHAZINE HYDROCHLORIDE 25 MG/1
12.5-25 SUPPOSITORY RECTAL EVERY 4 HOURS PRN
Status: DISCONTINUED | OUTPATIENT
Start: 2024-02-12 | End: 2024-02-14 | Stop reason: HOSPADM

## 2024-02-12 RX ORDER — POLYETHYLENE GLYCOL 3350 17 G/17G
1 POWDER, FOR SOLUTION ORAL
Status: DISCONTINUED | OUTPATIENT
Start: 2024-02-12 | End: 2024-02-14 | Stop reason: HOSPADM

## 2024-02-12 RX ORDER — PAROXETINE 30 MG/1
60 TABLET, FILM COATED ORAL
Status: DISCONTINUED | OUTPATIENT
Start: 2024-02-12 | End: 2024-02-14 | Stop reason: HOSPADM

## 2024-02-12 RX ORDER — LABETALOL HYDROCHLORIDE 5 MG/ML
10 INJECTION, SOLUTION INTRAVENOUS EVERY 4 HOURS PRN
Status: DISCONTINUED | OUTPATIENT
Start: 2024-02-12 | End: 2024-02-14 | Stop reason: HOSPADM

## 2024-02-12 RX ORDER — MORPHINE SULFATE 4 MG/ML
4 INJECTION INTRAVENOUS
Status: DISPENSED | OUTPATIENT
Start: 2024-02-12 | End: 2024-02-12

## 2024-02-12 RX ORDER — QUETIAPINE FUMARATE 100 MG/1
100 TABLET, FILM COATED ORAL NIGHTLY
Status: DISCONTINUED | OUTPATIENT
Start: 2024-02-12 | End: 2024-02-14 | Stop reason: HOSPADM

## 2024-02-12 RX ORDER — HYDROMORPHONE HYDROCHLORIDE 1 MG/ML
1 INJECTION, SOLUTION INTRAMUSCULAR; INTRAVENOUS; SUBCUTANEOUS ONCE
Status: COMPLETED | OUTPATIENT
Start: 2024-02-12 | End: 2024-02-12

## 2024-02-12 RX ORDER — ONDANSETRON 2 MG/ML
4 INJECTION INTRAMUSCULAR; INTRAVENOUS ONCE
Status: COMPLETED | OUTPATIENT
Start: 2024-02-12 | End: 2024-02-12

## 2024-02-12 RX ORDER — PROMETHAZINE HYDROCHLORIDE 25 MG/1
12.5-25 TABLET ORAL EVERY 4 HOURS PRN
Status: DISCONTINUED | OUTPATIENT
Start: 2024-02-12 | End: 2024-02-14 | Stop reason: HOSPADM

## 2024-02-12 RX ORDER — OMEPRAZOLE 20 MG/1
20 CAPSULE, DELAYED RELEASE ORAL 2 TIMES DAILY
Status: DISCONTINUED | OUTPATIENT
Start: 2024-02-12 | End: 2024-02-14 | Stop reason: HOSPADM

## 2024-02-12 RX ORDER — ONDANSETRON 2 MG/ML
4 INJECTION INTRAMUSCULAR; INTRAVENOUS EVERY 4 HOURS PRN
Status: DISCONTINUED | OUTPATIENT
Start: 2024-02-12 | End: 2024-02-14 | Stop reason: HOSPADM

## 2024-02-12 RX ORDER — OXYCODONE AND ACETAMINOPHEN 10; 325 MG/1; MG/1
1 TABLET ORAL EVERY 4 HOURS PRN
Status: DISCONTINUED | OUTPATIENT
Start: 2024-02-12 | End: 2024-02-14 | Stop reason: HOSPADM

## 2024-02-12 RX ORDER — PROCHLORPERAZINE EDISYLATE 5 MG/ML
5-10 INJECTION INTRAMUSCULAR; INTRAVENOUS EVERY 4 HOURS PRN
Status: DISCONTINUED | OUTPATIENT
Start: 2024-02-12 | End: 2024-02-14 | Stop reason: HOSPADM

## 2024-02-12 RX ADMIN — OXYCODONE AND ACETAMINOPHEN 1 TABLET: 10; 325 TABLET ORAL at 15:28

## 2024-02-12 RX ADMIN — MORPHINE SULFATE 4 MG: 4 INJECTION, SOLUTION INTRAMUSCULAR; INTRAVENOUS at 11:48

## 2024-02-12 RX ADMIN — HYDROMORPHONE HYDROCHLORIDE 1 MG: 1 INJECTION, SOLUTION INTRAMUSCULAR; INTRAVENOUS; SUBCUTANEOUS at 22:33

## 2024-02-12 RX ADMIN — SODIUM CHLORIDE, POTASSIUM CHLORIDE, SODIUM LACTATE AND CALCIUM CHLORIDE 1000 ML: 600; 310; 30; 20 INJECTION, SOLUTION INTRAVENOUS at 08:18

## 2024-02-12 RX ADMIN — ONDANSETRON 4 MG: 4 TABLET, ORALLY DISINTEGRATING ORAL at 17:12

## 2024-02-12 RX ADMIN — ONDANSETRON 4 MG: 2 INJECTION INTRAMUSCULAR; INTRAVENOUS at 08:19

## 2024-02-12 RX ADMIN — MORPHINE SULFATE 4 MG: 4 INJECTION, SOLUTION INTRAMUSCULAR; INTRAVENOUS at 08:22

## 2024-02-12 RX ADMIN — POTASSIUM CHLORIDE 10 MEQ: 7.46 INJECTION, SOLUTION INTRAVENOUS at 15:56

## 2024-02-12 RX ADMIN — POTASSIUM CHLORIDE 10 MEQ: 7.46 INJECTION, SOLUTION INTRAVENOUS at 14:37

## 2024-02-12 RX ADMIN — PAROXETINE 60 MG: 30 TABLET, FILM COATED ORAL at 20:51

## 2024-02-12 RX ADMIN — POTASSIUM CHLORIDE 10 MEQ: 7.46 INJECTION, SOLUTION INTRAVENOUS at 17:07

## 2024-02-12 RX ADMIN — QUETIAPINE FUMARATE 100 MG: 100 TABLET ORAL at 20:52

## 2024-02-12 RX ADMIN — OXYCODONE AND ACETAMINOPHEN 1 TABLET: 10; 325 TABLET ORAL at 21:35

## 2024-02-12 RX ADMIN — SODIUM CHLORIDE, POTASSIUM CHLORIDE, SODIUM LACTATE AND CALCIUM CHLORIDE: 600; 310; 30; 20 INJECTION, SOLUTION INTRAVENOUS at 22:40

## 2024-02-12 RX ADMIN — POTASSIUM CHLORIDE 10 MEQ: 7.46 INJECTION, SOLUTION INTRAVENOUS at 18:09

## 2024-02-12 RX ADMIN — INFLUENZA A VIRUS A/VICTORIA/4897/2022 IVR-238 (H1N1) ANTIGEN (FORMALDEHYDE INACTIVATED), INFLUENZA A VIRUS A/DARWIN/9/2021 SAN-010 (H3N2) ANTIGEN (FORMALDEHYDE INACTIVATED), INFLUENZA B VIRUS B/PHUKET/3073/2013 ANTIGEN (FORMALDEHYDE INACTIVATED), AND INFLUENZA B VIRUS B/MICHIGAN/01/2021 ANTIGEN (FORMALDEHYDE INACTIVATED) 0.5 ML: 15; 15; 15; 15 INJECTION, SUSPENSION INTRAMUSCULAR at 18:41

## 2024-02-12 RX ADMIN — SODIUM CHLORIDE, POTASSIUM CHLORIDE, SODIUM LACTATE AND CALCIUM CHLORIDE: 600; 310; 30; 20 INJECTION, SOLUTION INTRAVENOUS at 12:51

## 2024-02-12 RX ADMIN — OMEPRAZOLE 20 MG: 20 CAPSULE, DELAYED RELEASE ORAL at 17:12

## 2024-02-12 ASSESSMENT — COGNITIVE AND FUNCTIONAL STATUS - GENERAL
STANDING UP FROM CHAIR USING ARMS: A LITTLE
SUGGESTED CMS G CODE MODIFIER DAILY ACTIVITY: CK
WALKING IN HOSPITAL ROOM: A LITTLE
PERSONAL GROOMING: A LITTLE
TOILETING: A LITTLE
MOVING FROM LYING ON BACK TO SITTING ON SIDE OF FLAT BED: A LITTLE
DRESSING REGULAR LOWER BODY CLOTHING: A LITTLE
HELP NEEDED FOR BATHING: A LOT
SUGGESTED CMS G CODE MODIFIER MOBILITY: CK
DRESSING REGULAR UPPER BODY CLOTHING: A LOT
MOBILITY SCORE: 19
CLIMB 3 TO 5 STEPS WITH RAILING: A LOT
DAILY ACTIVITIY SCORE: 17

## 2024-02-12 ASSESSMENT — LIFESTYLE VARIABLES
AVERAGE NUMBER OF DAYS PER WEEK YOU HAVE A DRINK CONTAINING ALCOHOL: 0
HAVE PEOPLE ANNOYED YOU BY CRITICIZING YOUR DRINKING: NO
TOTAL SCORE: 0
ALCOHOL_USE: NO
HAVE YOU EVER FELT YOU SHOULD CUT DOWN ON YOUR DRINKING: NO
EVER HAD A DRINK FIRST THING IN THE MORNING TO STEADY YOUR NERVES TO GET RID OF A HANGOVER: NO
HOW MANY TIMES IN THE PAST YEAR HAVE YOU HAD 5 OR MORE DRINKS IN A DAY: 0
TOTAL SCORE: 0
ON A TYPICAL DAY WHEN YOU DRINK ALCOHOL HOW MANY DRINKS DO YOU HAVE: 0
DOES PATIENT WANT TO STOP DRINKING: NO
EVER FELT BAD OR GUILTY ABOUT YOUR DRINKING: NO
CONSUMPTION TOTAL: NEGATIVE
TOTAL SCORE: 0

## 2024-02-12 ASSESSMENT — PATIENT HEALTH QUESTIONNAIRE - PHQ9
5. POOR APPETITE OR OVEREATING: SEVERAL DAYS
3. TROUBLE FALLING OR STAYING ASLEEP OR SLEEPING TOO MUCH: NEARLY EVERY DAY
2. FEELING DOWN, DEPRESSED, IRRITABLE, OR HOPELESS: MORE THAN HALF THE DAYS
SUM OF ALL RESPONSES TO PHQ QUESTIONS 1-9: 19
1. LITTLE INTEREST OR PLEASURE IN DOING THINGS: NEARLY EVERY DAY
7. TROUBLE CONCENTRATING ON THINGS, SUCH AS READING THE NEWSPAPER OR WATCHING TELEVISION: SEVERAL DAYS
6. FEELING BAD ABOUT YOURSELF - OR THAT YOU ARE A FAILURE OR HAVE LET YOURSELF OR YOUR FAMILY DOWN: NEARLY EVERY DAY
4. FEELING TIRED OR HAVING LITTLE ENERGY: NEARLY EVERY DAY
9. THOUGHTS THAT YOU WOULD BE BETTER OFF DEAD, OR OF HURTING YOURSELF: SEVERAL DAYS
8. MOVING OR SPEAKING SO SLOWLY THAT OTHER PEOPLE COULD HAVE NOTICED. OR THE OPPOSITE, BEING SO FIGETY OR RESTLESS THAT YOU HAVE BEEN MOVING AROUND A LOT MORE THAN USUAL: MORE THAN HALF THE DAYS
SUM OF ALL RESPONSES TO PHQ9 QUESTIONS 1 AND 2: 5

## 2024-02-12 ASSESSMENT — FIBROSIS 4 INDEX
FIB4 SCORE: 0.2
FIB4 SCORE: 0.2
FIB4 SCORE: 0.71

## 2024-02-12 ASSESSMENT — PAIN DESCRIPTION - PAIN TYPE
TYPE: ACUTE PAIN

## 2024-02-12 NOTE — ASSESSMENT & PLAN NOTE
Chronic, intermittent, subjectively associated with melanotic stools.  She also has GI history notable for gastric perforated ulcer, dilated bile duct and s/p G-tube which was removed secondary to infection.  -Transient symptomatic relief with GI cocktail    Plan  -Continue omeprazole 20mg BID  -Consider H. Pylori work up  -will attempt to obtain outpatient GI records to obtain further details regarding pts previous GI work-up

## 2024-02-12 NOTE — ED NOTES
Med Rec complete per patient   Allergies reviewed  Antibiotics in the past 30 days:no  Anticoagulant in past 14 days:no  Pharmacy patient utilizes:[facility] [address]    Pt states she took 2 tabs of Tylenol this morning

## 2024-02-12 NOTE — ASSESSMENT & PLAN NOTE
Mild, asymptomatic, likely secondary to chronic poor p.o. intake, and nausea/vomiting.    Plan  -Continue to monitor, replace as needed  -Encourage p.o. intake as tolerated

## 2024-02-12 NOTE — ASSESSMENT & PLAN NOTE
Chronically elevated, most recent CMP 2/12, level was 218 up from 207 on 2/10. GGT mildly elevated which indicates likely liver etiology, concerns for PSC versus PBC.    Plan:   -Will continue to reach out to outpatient GI in efforts to obtain records

## 2024-02-12 NOTE — ASSESSMENT & PLAN NOTE
Chronic, stable and well controlled on Seroquel 60mg and Paxil 100mg    Plan  -Cont home med regimen

## 2024-02-12 NOTE — SENIOR ADMIT NOTE
[facility] SENIOR ADMIT NOTE    PATIENT ID:  NAME:  Mary Martinez  MRN:               [id number]  YOB: 1970    Date of Admission: 2/12/2024     Attending: Matthew Quinonez MD    Resident: Stormy Gotti MD (PGY-2)    Primary Care Physician:  West Aguilera M.D.    CC:  Nausea/vomiting    HPI: Mary Martinez is a 53 y.o. female with history of recurrent intractable nausea/vomiting, chronic pain syndrome, GERD, rheumatoid arthritis, severe protein calorie malnutrition, SBO, substance abuse and likely SMA syndrome, admitted for intractable nausea/vomiting.  Patient reports her symptoms began approximately 8 days ago consisting of intermittent nausea and vomiting.  Patient states that she has been unable to keep any solids down but does tolerate occasional sips of fluids.  Patient also reports chronic loose stools/diarrhea and melena.  She has had extensive workup through GI and has a history notable for G-tube for feeding in setting of nausea vomiting.  Unfortunately, G-tube failed secondary to infection per patient.  She denies any fevers, chills, headaches, shortness of breath or chest pain although endorses abdominal pain/soreness.  Patient also denies any dysuria.      Please see H&P for further information concerning presentation.    REVIEW OF SYSTEMS:   See H&P             PAST MEDICAL HISTORY:  Past Medical History:   Diagnosis Date    Acute renal failure (ARF) (HCC)     Acute renal failure (HCC) 10/06/2011    Allergy     Anemia     Anxiety     Arthritis     Rheumatoid -follows with rheumatologist. Has several fractures due to RA.    ASTHMA     inhalers prn    Blood transfusion without reported diagnosis     Bowel habit changes     4/24/20-constipation and diarrhea.    Bronchitis last approx 2018    Chronic pain 04/24/2020    Due to RA    Dental disorder     no teeth upper-does not wear her denture. Broken teeth on bottom.    Depression     GERD (gastroesophageal reflux disease)     GIB  (gastrointestinal bleeding)     Head ache     Heart burn     taking famotidine    Hiatal hernia     Hiatus hernia syndrome     History of pancreatitis     Hypokalemia     Hyponatremia     Idiopathic chronic pancreatitis (HCC)     Indigestion     taking famotidine    Intractable nausea and vomiting     Kidney disease     Leukocytosis 10/08/2011    Pain     CPS-everywhere    Pneumonia 10/2019    PONV (postoperative nausea and vomiting)     Psychiatric problem     anxiety and depression    Rheumatoid aortitis     Rheumatoid arthritis(714.0) 2003    Severe protein-calorie malnutrition (HCC)     Small bowel obstruction (HCC) 10/06/2011    SMAS (superior mesenteric artery syndrome) (HCC)     Substance abuse (HCC)     UTI (urinary tract infection) 05/25/2023    Vitamin B12 deficiency neuropathy (HCC)        PAST SURGICAL HISTORY:  Past Surgical History:   Procedure Laterality Date    NV UPPER GI ENDOSCOPY,DIAGNOSIS N/A 8/16/2023    Procedure: GASTROSCOPY;  Surgeon: Blossom Peres M.D.;  Location: SURGERY SAME DAY [facility];  Service: Gastroenterology    NV PLACE PERCUT GASTROSTOMY TUBE N/A 6/12/2023    Procedure: INSERTION, PEG TUBE - PEG AND J TUBE PLACEMENT;  Surgeon: Marilyn Anderson M.D.;  Location: SURGERY SAME DAY [facility];  Service: Gastroenterology    NV UPPER GI ENDOSCOPY,DIAGNOSIS  6/12/2023    Procedure: GASTROSCOPY;  Surgeon: Marilyn Anderson M.D.;  Location: SURGERY SAME DAY [facility];  Service: Gastroenterology    NV UPPER GI ENDOSCOPY,DIAGNOSIS N/A 9/9/2022    Procedure: ENDOSCOPIC ULTRASOUND- UPPER;  Surgeon: Jorge Brooke M.D.;  Location: [facility];  Service: EUS    EGD W/ENDOSCOPIC ULTRASOUND N/A 9/9/2022    Procedure: EGD, WITH ENDOSCOPIC US;  Surgeon: Jorge Brooke M.D.;  Location: [facility];  Service: EUS    NV ENDOSCOPIC US EXAM, ESOPH  4/29/2020    Procedure: EGD, WITH ENDOSCOPIC US;  Surgeon: Jorge Brooke M.D.;  Location: [facility]  ORS;  Service: Gastroenterology    GASTROSCOPY-ENDO  4/29/2020    Procedure: GASTROSCOPY;  Surgeon: Jorge Brooke M.D.;  Location: [facility];  Service: Gastroenterology    EGD WITH ASP/BX  4/29/2020    Procedure: EGD, WITH ASPIRATION BIOPSY - POSS FNA;  Surgeon: Jorge Brooke M.D.;  Location: [facility];  Service: Gastroenterology    IA EXPLORATORY OF ABDOMEN N/A 10/4/2019    Procedure: LAPAROTOMY, EXPLORATORY AND REPAIR OF DUODENAL PERFORATION;  Surgeon: James Dumont M.D.;  Location: [facility];  Service: General    COLONOSCOPY  2018    with upper endoscopy    FINGER ARTHROPLASTY Right 6/5/2017    Procedure: FINGER ARTHROPLASTY - LONG, RING AND SMALL VOLAR PLATE;  Surgeon: Lobo Rosen M.D.;  [facility]ERY [facility];  Service:     FINGER AMPUTATION  6/5/2017    Procedure: FINGER AMPUTATION - LONG, RING AND SMALL AT THE PROXIMAL INTERPHALANGEAL JOINT;  Surgeon: Lobo Rosen M.D.;  [facility]ERY [facility];  Service:     FINGER ARTHROPLASTY Right 4/17/2017    Procedure: FINGER ARTHROPLASTY ;  Surgeon: Lobo Rosen M.D.;  [facility]ERY [facility];  Service:     FINGER AMPUTATION Right 9/14/2016    Procedure: FINGER AMPUTATION INDEX;  Surgeon: Lobo Rosen M.D.;  [facility]ERY [facility];  Service:     IRRIGATION & DEBRIDEMENT ORTHO Right 9/11/2016    Procedure: IRRIGATION & DEBRIDEMENT ORTHO - right index finger;  Surgeon: Madhu Rosen M.D.;  Location: [facility];  Service:     FUSION, PIP JOINT, TOE Right 8/29/2016    Procedure: RE-DO INDEX FINGER PROXIMAL INTERPHALANGEAL ARTHRODESIS;  Surgeon: Lobo Rosen M.D.;  Location[facility]ERY [facility];  Service:     BONE GRAFT Right 8/29/2016    Procedure: BONE GRAFT - DISTAL RADIUS ;  Surgeon: Lobo Rosen M.D.;  [facility]ERY [facility];  Service:      ARTHRODESIS Right 5/6/2016    Procedure: ARTHRODESIS INDEX FINGER PROXIMAL INTERPHALANGEAL;  Surgeon: Lobo Rosen M.D.;  Location: SURGERY SAME DAY [facility];  Service:     FOOT RECONSTRUCTION RHEUMATIC Left 7/29/2015    Procedure: FOOT RECONSTRUCTION RHEUMATIC;  Surgeon: Heriberto Alves M.D.;  Location: SURGERY [facility];  Service:     TOE FUSION Right 5/27/2015    Procedure: TOE FUSION 1ST METATARSALPHALANGEAL JOINT;  Surgeon: Heriberto Alves M.D.;  Location: SURGERY [facility];  Service:     BONE SPUR EXCISION  5/27/2015    Procedure: BONE SPUR EXCISION METATARSAL HEAD 2-3;  Surgeon: Heriberto Alves M.D.;  Location: SURGERY [facility];  Service:     HAMMERTOE CORRECTION  5/27/2015    Procedure: HAMMERTOE CORRECTION AND SOFT TISSUE RE-ALINGMENT 2-3 ;  Surgeon: Heriberto Alves M.D.;  Location: SURGERY [facility];  Service:     DENTAL EXTRACTION(S)  2014    all of upper teeth    ABDOMINAL ABSCESS DRAINAGE  9/27/2011    Performed by VERO WRIGHT at SURGERY [facility]    EMANUEL BY LAPAROSCOPY  9/27/2011    Performed by VERO WRIGHT at [facility]    APPENDECTOMY  2011    Found out it had ruptured prior to abcess surgery    REPEAT C SECTION  2008    REPEAT C SECTION  2005    REPEAT C SECTION  1999    PRIMARY C SECTION  1991    TONSILLECTOMY  1982    WRIST EXPLORATION Left 1980's    fractured wrist-no hardware       FAMILY HISTORY:  See H&P    SOCIAL HISTORY:   See H&P    ALLERGIES:  Allergies   Allergen Reactions    Penicillins Anaphylaxis and Hives     Tolerates cephalosporins; reports throat swelling with PCN    Aripiprazole Nausea     Spasms, shaking      Nitrous Oxide Vomiting       OUTPATIENT MEDICATIONS:    Current Facility-Administered Medications:     morphine 4 MG/ML injection 4 mg, 4 mg, Intravenous, Q2HRS PRN, Ignacio Person M.D., 4 mg at 02/12/24 1148    lactated ringers infusion, , Intravenous, Continuous, Argenis Larson D.O., Last Rate:  "100 mL/hr at 24 1251, New Bag at 24 1251    omeprazole (PriLOSEC) capsule 20 mg, 20 mg, Oral, BID, Argenis Larson D.O.    ondansetron (Zofran) syringe/vial injection 4 mg, 4 mg, Intravenous, Q4HRS PRN, Argenis Larson D.O.    ondansetron (Zofran ODT) dispertab 4 mg, 4 mg, Oral, Q4HRS PRN, HEAVEN MorenoO.    promethazine (Phenergan) tablet 12.5-25 mg, 12.5-25 mg, Oral, Q4HRS PRN, HEAVEN MorenoO.    promethazine (Phenergan) suppository 12.5-25 mg, 12.5-25 mg, Rectal, Q4HRS PRN, HEAVEN MorenoO.    prochlorperazine (Compazine) injection 5-10 mg, 5-10 mg, Intravenous, Q4HRS PRN, Argenis Larson D.O.    labetalol (Normodyne/Trandate) injection 10 mg, 10 mg, Intravenous, Q4HRS PRN, Argenis Larson D.O.    acetaminophen (Tylenol) tablet 1,000 mg, 1,000 mg, Oral, Q6HRS PRN, HEAVEN MorenoO.    senna-docusate (Pericolace Or Senokot S) 8.6-50 MG per tablet 2 Tablet, 2 Tablet, Oral, Q EVENING **AND** polyethylene glycol/lytes (Miralax) Packet 1 Packet, 1 Packet, Oral, QDAY PRN, DEEJAY Moreno.O.    PHYSICAL EXAM:  Vitals:    24 0731 24 0746 24 1101 24 1221   BP:  102/72 110/70 105/73   Pulse:  92 80 77   Resp:  17  16   Temp:    36.7 °C (98.1 °F)   TempSrc:    Temporal   SpO2:  96% 96% 94%   Weight: 39.9 kg (88 lb)      Height: 1.6 m (5' 3\")      , Temp (24hrs), Av.7 °C (98.1 °F), Min:36.7 °C (98.1 °F), Max:36.7 °C (98.1 °F)  , Pulse Oximetry: 94 %, O2 Delivery Device: None - Room Air    Constitutional: Alert and oriented, no acute distress, cachectic  Skin: Warm, dry, good turgor, no rashes in visible areas.  Eye: EOMI  ENMT: Lips without lesions, edentulous, dry mucous membranes  Neck: Trachea midline, no masses, no thyromegaly.  Respiratory: Unlabored respiratory effort, no cough.  Abdomen: Scaphoid abdomen, mild diffuse tenderness to palpation, guarding  MSK: Moves all extremities.  Neuro: Grossly normal  Psych: " Normal affect and mood.      LAB TESTS:   Recent Labs     02/10/24  1548 02/12/24  0902   WBC 6.8 12.8*   RBC 4.03* 4.48   HEMOGLOBIN 12.5 13.8   HEMATOCRIT 38.5 42.1   MCV 95.5 94.0   MCH 31.0 30.8   RDW 51.2* 51.9*   PLATELETCT 577* 556*   MPV 9.1 9.4   NEUTSPOLYS 60.90 78.70*   LYMPHOCYTES 26.40 12.80*   MONOCYTES 9.70 6.40   EOSINOPHILS 1.60 0.90   BASOPHILS 1.00 0.90         Recent Labs     02/10/24  1548 02/12/24  0902   SODIUM 135 138   POTASSIUM 3.5* 3.3*   CHLORIDE 106 106   CO2 13* 14*   BUN 6* 14   CREATININE 0.62 0.92   CALCIUM 9.0 8.5   MAGNESIUM  --  2.2   ALBUMIN 3.6 3.9         IMAGING:  DX-ABDOMEN COMPLETE WITH AP OR PA CXR   Final Result      1.  Cholecystectomy.   2.  Nonobstructive 6 mm renal calculus lower pole left kidney concordant with CT findings.   3.  No evidence of acute bowel obstruction or other acute intra-abdominal disease process.   4.  No acute cardiopulmonary disease evident.          CONSULTS:   None    ASSESSMENT: 53 y.o. female with history of recurrent intractable nausea/vomiting, chronic pain syndrome, GERD, rheumatoid arthritis, severe protein calorie malnutrition, SBO, substance abuse and likely SMA syndrome, admitted for intractable nausea/vomiting.    BRIEF PLAN:    #Nausea/vomiting  Chronic, recurring, etiology unknown.  GI history notable for GERD, perforated gastric ulcer, cholecystectomy, appendectomy, s/p failed G-tube placed for purposes of feeding in setting of intractable nausea/vomiting however, failed secondary to infection (Pseudomonas).  QTc on admission 461.    Plan:  -Zofran 4 mg every 4 hours as needed  -Compazine 5 to 10 mg as needed every 4 hours  -Phenergan 12.5-25 mg as needed every 4 hours  -Encourage p.o. hydration as tolerated  -IV fluids at maintenance  -Consider GI consult/surgical consult.  Patient open to exploring possibility of G/J-tube placement once again.    #Abdominal pain  Chronic, recurrent, diffuse, in setting of intractable  nausea/vomiting.  X-ray abdomen benign, notable for nonobstructive 6 mm renal calculus in lower pole left kidney.  Physical exam reassuring, nonsurgical abdomen.    Plan:  -Pain management    #Anion gap acidosis  Anion gap 18, secondary to GI losses in setting of intractable vomiting and chronic diarrhea.    Plan:  -Continue to encourage p.o. hydration  -IV maintenance fluids    #Hypokalemia  K 3.3, asymptomatic, likely secondary to acute GI losses in setting of intractable nausea/vomiting and chronic diarrhea.    Plan:  -Replace potassium as needed  -Continue to monitor    #Leukocytosis  WBC 12.8, likely reactive, no evidence of acute infectious process.    Plan:  -Continue to monitor    #Thrombocytosis  Chronic, platelet count 556, etiology unknown.  No prior history of thrombotic events.  Unlikely to be secondary to hemoconcentration given other blood components are within normal limits.    Plan:  -No additional interventions indicated at this time    #Severe protein calorie malnutrition  Chronic, BMI 15.59 kg/m², secondary to chronic intractable nausea/vomiting and diarrhea.  Patient is at high risk for refeeding syndrome.    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  Patient suffers with significant pain and is on chronic opioid medication.    Plan:  -Continue home oxycodone dose 10 mg daily    #Elevated alkaline phosphatase  Chronic, etiology unknown, likely secondary to bone turnover in setting of severe RA versus hepatic.  Suspect this problem has been worked up outpatient however, unable to access records at this time.  Attempted to contact outpatient GI practice but was unable to get through.    Plan:  -Order GGT  -Attempt to reach outpatient GI practice ([facility])    CODE STATUS: DNR/DNI    Stormy Gotti  MD  [facility]  PGY-2

## 2024-02-12 NOTE — H&P
[facility] MEDICINE H&P        PATIENT ID:  NAME:  Mary Martinez  MRN:               [id number]  YOB: 1970    Date of Admission: 2024     Attending: Matthew Quinonez M.d.    Ryan Resident: Argenis Larson D.O.    Senior Resident: Stormy Gotti MD    Primary Care Physician:  West Aguilera M.D.    CC:  nausea and vomiting     HPI: Mary Martinez is a 52yo female with a past medical history of chronic pain syndrome, GERD, rheumatoid arthritis requiring amputation of digits of the right hand, malnutrition, small bowel obstruction, substance abuse who presented with an 8 day history of nausea and vomiting with associated epigastric pain.  Her epigastric pain is improved with laying on her left side. She takes omeprazole at home which had not alleviated her abdominal pain.  Denies any exacerbating or modifying factors of her nausea and vomiting.  She has not been able to tolerate eating food in 8 days.  She has been able to tolerate sips of water.  She has noted recently her stool has become dark in color.  Endorses diarrhea and which she states is chronic. Denies any fever, chills, dysuria, shortness of breath.   She was admitted 24 for similar issues and seen in the ED on 2/10/24. Previously she required G-tube placement due to severe malnutrition in the setting of intractable nausea and vomiting.  She had the G-tube removed after it became infected.   Surgical history significant for cholecystectomy, appendectomy,  x 4.       ERCourse:  CBC remarkable for elevated white count of 12.8 and CMP remarkable for potassium of 3.3, bicarb of 14, and anion gap of 18. Alk phos elevated to 218. UA with protein of 30, otherwise WNL.      DX abdomen showed:   1.  Cholecystectomy.  2.  Nonobstructive 6 mm renal calculus lower pole left kidney concordant with CT findings.  3.  No evidence of acute bowel obstruction or other acute intra-abdominal disease process.  4.  No acute  cardiopulmonary disease evident.    Pt was treated with 1L bolus and 4mg zofran.     REVIEW OF SYSTEMS:   Ten systems reviewed and were negative except as noted in the HPI.                PAST MEDICAL HISTORY:  Past Medical History:   Diagnosis Date    Acute renal failure (ARF) (HCC)     Acute renal failure (HCC) 10/06/2011    Allergy     Anemia     Anxiety     Arthritis     Rheumatoid -follows with rheumatologist. Has several fractures due to RA.    ASTHMA     inhalers prn    Blood transfusion without reported diagnosis     Bowel habit changes     4/24/20-constipation and diarrhea.    Bronchitis last approx 2018    Chronic pain 04/24/2020    Due to RA    Dental disorder     no teeth upper-does not wear her denture. Broken teeth on bottom.    Depression     GERD (gastroesophageal reflux disease)     GIB (gastrointestinal bleeding)     Head ache     Heart burn     taking famotidine    Hiatal hernia     Hiatus hernia syndrome     History of pancreatitis     Hypokalemia     Hyponatremia     Idiopathic chronic pancreatitis (HCC)     Indigestion     taking famotidine    Intractable nausea and vomiting     Kidney disease     Leukocytosis 10/08/2011    Pain     CPS-everywhere    Pneumonia 10/2019    PONV (postoperative nausea and vomiting)     Psychiatric problem     anxiety and depression    Rheumatoid aortitis     Rheumatoid arthritis(714.0) 2003    Severe protein-calorie malnutrition (HCC)     Small bowel obstruction (HCC) 10/06/2011    SMAS (superior mesenteric artery syndrome) (HCC)     Substance abuse (HCC)     UTI (urinary tract infection) 05/25/2023    Vitamin B12 deficiency neuropathy (HCC)        PAST SURGICAL HISTORY:  Past Surgical History:   Procedure Laterality Date    KS UPPER GI ENDOSCOPY,DIAGNOSIS N/A 8/16/2023    Procedure: GASTROSCOPY;  Surgeon: Blossom Peres M.D.;  Location: SURGERY SAME DAY [facility];  Service: Gastroenterology    KS PLACE PERCUT GASTROSTOMY TUBE N/A 6/12/2023    Procedure: INSERTION,  PEG TUBE - PEG AND J TUBE PLACEMENT;  Surgeon: Marilyn Anderson M.D.;  Location: SURGERY SAME DAY [facility];  Service: Gastroenterology    NY UPPER GI ENDOSCOPY,DIAGNOSIS  6/12/2023    Procedure: GASTROSCOPY;  Surgeon: Marilyn Anderson M.D.;  Location: SURGERY SAME DAY [facility];  Service: Gastroenterology    NY UPPER GI ENDOSCOPY,DIAGNOSIS N/A 9/9/2022    Procedure: ENDOSCOPIC ULTRASOUND- UPPER;  Surgeon: Jorge Brooke M.D.;  Location: [facility];  Service: EUS    EGD W/ENDOSCOPIC ULTRASOUND N/A 9/9/2022    Procedure: EGD, WITH ENDOSCOPIC US;  Surgeon: Jorge Brooke M.D.;  Location: [facility];  Service: EUS    NY ENDOSCOPIC US EXAM, ESOPH  4/29/2020    Procedure: EGD, WITH ENDOSCOPIC US;  Surgeon: Jorge Brooke M.D.;  Location: [facility];  Service: Gastroenterology    GASTROSCOPY-ENDO  4/29/2020    Procedure: GASTROSCOPY;  Surgeon: Jorge Brooke M.D.;  Location: [facility];  Service: Gastroenterology    EGD WITH ASP/BX  4/29/2020    Procedure: EGD, WITH ASPIRATION BIOPSY - POSS FNA;  Surgeon: Jorge Brooke M.D.;  Location: [facility];  Service: Gastroenterology    [place] EXPLORATORY OF ABDOMEN N/A 10/4/2019    Procedure: LAPAROTOMY, EXPLORATORY AND REPAIR OF DUODENAL PERFORATION;  Surgeon: James Dumont M.D.;  Location: [facility];  Service: General    COLONOSCOPY  2018    with upper endoscopy    FINGER ARTHROPLASTY Right 6/5/2017    Procedure: FINGER ARTHROPLASTY - LONG, RING AND SMALL VOLAR PLATE;  Surgeon: Lobo Rosen M.D.;  Location: SURGERY SAME DAY [facility];  Service:     FINGER AMPUTATION  6/5/2017    Procedure: FINGER AMPUTATION - LONG, RING AND SMALL AT THE PROXIMAL INTERPHALANGEAL JOINT;  Surgeon: Lobo Rosen M.D.;  Location: SURGERY SAME DAY [facility];  Service:     FINGER ARTHROPLASTY Right 4/17/2017    Procedure: FINGER ARTHROPLASTY ;  Surgeon: Lobo SMILEY  FRANKLIN Rosen;  Location: SURGERY SAME DAY [facility];  Service:     FINGER AMPUTATION Right 9/14/2016    Procedure: FINGER AMPUTATION INDEX;  Surgeon: Lobo Rosen M.D.;  Location: SURGERY SAME DAY [facility];  Service:     IRRIGATION & DEBRIDEMENT ORTHO Right 9/11/2016    Procedure: IRRIGATION & DEBRIDEMENT ORTHO - right index finger;  Surgeon: Madhu Rosen M.D.;  Location: SURGERY [facility];  Service:     FUSION, PIP JOINT, TOE Right 8/29/2016    Procedure: RE-DO INDEX FINGER PROXIMAL INTERPHALANGEAL ARTHRODESIS;  Surgeon: Lobo Rosen M.D.;  Location: SURGERY SAME DAY [facility];  Service:     BONE GRAFT Right 8/29/2016    Procedure: BONE GRAFT - DISTAL RADIUS ;  Surgeon: Lobo Rosen M.D.;  Location: SURGERY SAME DAY [facility];  Service:     ARTHRODESIS Right 5/6/2016    Procedure: ARTHRODESIS INDEX FINGER PROXIMAL INTERPHALANGEAL;  Surgeon: Lobo Rosen M.D.;  Location: SURGERY SAME DAY [facility];  Service:     FOOT RECONSTRUCTION RHEUMATIC Left 7/29/2015    Procedure: FOOT RECONSTRUCTION RHEUMATIC;  Surgeon: Heriberto Alves M.D.;  Location: SURGERY [facility];  Service:     TOE FUSION Right 5/27/2015    Procedure: TOE FUSION 1ST METATARSALPHALANGEAL JOINT;  Surgeon: Heriberto Alves M.D.;  Location: SURGERY [facility];  Service:     BONE SPUR EXCISION  5/27/2015    Procedure: BONE SPUR EXCISION METATARSAL HEAD 2-3;  Surgeon: Heriberto Alves M.D.;  Location: SURGERY [facility];  Service:     HAMMERTOE CORRECTION  5/27/2015    Procedure: HAMMERTOE CORRECTION AND SOFT TISSUE RE-ALINGMENT 2-3 ;  Surgeon: Heriberto Alves M.D.;  Location: SURGERY [facility];  Service:     DENTAL EXTRACTION(S)  2014    all of upper teeth    ABDOMINAL ABSCESS DRAINAGE  9/27/2011    Performed by VERO WRIGHT at [facility]    EMANUEL BY LAPAROSCOPY  9/27/2011    Performed by VERO WRIGHT at [facility]    APPENDECTOMY   2011    Found out it had ruptured prior to abcess surgery    REPEAT C SECTION  2008    REPEAT C SECTION  2005    REPEAT C SECTION  1999    PRIMARY C SECTION  1991    TONSILLECTOMY  1982    WRIST EXPLORATION Left 1980's    fractured wrist-no hardware       FAMILY HISTORY:  Family History   Problem Relation Age of Onset    Cancer Mother     Heart Disease Mother     Hypertension Mother     Heart Disease Father     Hypertension Father        SOCIAL HISTORY:   Smoking 30ppd, quit 3 months ago  Etoh use denies   Drug use denies    DIET:   Orders Placed This Encounter   Procedures    Diet Order Diet: Regular     Standing Status:   Standing     Number of Occurrences:   1     Order Specific Question:   Diet:     Answer:   Regular [1]       ALLERGIES:  Allergies   Allergen Reactions    Penicillins Anaphylaxis and Hives     Tolerates cephalosporins; reports throat swelling with PCN    Aripiprazole Nausea     Spasms, shaking      Nitrous Oxide Vomiting       OUTPATIENT MEDICATIONS:    Current Facility-Administered Medications:     lactated ringers infusion, , Intravenous, Continuous, Argenis Larson D.O., Last Rate: 100 mL/hr at 02/12/24 1251, New Bag at 02/12/24 1251    omeprazole (PriLOSEC) capsule 20 mg, 20 mg, Oral, BID, DEEJAY Moreno.O.    ondansetron (Zofran) syringe/vial injection 4 mg, 4 mg, Intravenous, Q4HRS PRN, DEEJAY Moreno.O.    ondansetron (Zofran ODT) dispertab 4 mg, 4 mg, Oral, Q4HRS PRN, DEEJAY Moreno.O.    promethazine (Phenergan) tablet 12.5-25 mg, 12.5-25 mg, Oral, Q4HRS PRN, DEEJAY Moreno.O.    promethazine (Phenergan) suppository 12.5-25 mg, 12.5-25 mg, Rectal, Q4HRS PRN, DEEJAY Moreno.O.    prochlorperazine (Compazine) injection 5-10 mg, 5-10 mg, Intravenous, Q4HRS PRN, DEEJAY Moreno.O.    labetalol (Normodyne/Trandate) injection 10 mg, 10 mg, Intravenous, Q4HRS PRN, DEEJAY Moreno.O.    acetaminophen (Tylenol) tablet 1,000 mg, 1,000 mg,  "Oral, Q6HRS PRN, Argenis Larson D.O.    senna-docusate (Pericolace Or Senokot S) 8.6-50 MG per tablet 2 Tablet, 2 Tablet, Oral, Q EVENING **AND** polyethylene glycol/lytes (Miralax) Packet 1 Packet, 1 Packet, Oral, QDAY PRN, Argenis Larson D.O.    potassium chloride (Kcl) ivpb 10 mEq, 10 mEq, Intravenous, Q HOUR, Argenis Larson D.O.    oxyCODONE-acetaminophen (Percocet-10)  MG per tablet 1 Tablet, 1 Tablet, Oral, Q4HRS PRN, Stormy Gotti M.D.    PARoxetine (Paxil) tablet 60 mg, 60 mg, Oral, QHS, Stormy Gotti M.D.    QUEtiapine (SEROquel) tablet 100 mg, 100 mg, Oral, Nightly, Stormy Gotti M.D.    PHYSICAL EXAM:  Vitals:    24 0731 24 0746 24 1101 24 1221   BP:  102/72 110/70 105/73   Pulse:  92 80 77   Resp:  17  16   Temp:    36.7 °C (98.1 °F)   TempSrc:    Temporal   SpO2:  96% 96% 94%   Weight: 39.9 kg (88 lb)      Height: 1.6 m (5' 3\")      , Temp (24hrs), Av.7 °C (98.1 °F), Min:36.7 °C (98.1 °F), Max:36.7 °C (98.1 °F)  , Pulse Oximetry: 94 %, O2 Delivery Device: None - Room Air    General: Pt resting in NAD, cooperative   Skin:  Pink, warm and dry.  No rashes  HEENT: NC/AT. PERRL. EOMI. MMM. No nasal discharge. Oropharynx nonerythematous without exudate/plaques  Neck:  Supple without lymphadenopathy or rigidity. No JVD   Lungs:  Symmetrical.  CTAB with no W/R/R.  Good air movement   Cardiovascular:  S1/S2 RRR without M/R/G.  Abdomen:  Abdomen is scaphoid. Soft, Mild tenderness to RLQ with some voluntary guarding. +BS. No masses noted.  Extremities:  Full range of motion. No gross deformities noted. 2+ pulses in all extremities. No C/C/E   Spine:  Straight without vertebral anomalies.  CNS:  Muscle tone is normal. Cranial nerves II-XII grossly intact. 2+ DTRs.         LAB TESTS:   Recent Labs     02/10/24  1548 24  0902   WBC 6.8 12.8*   RBC 4.03* 4.48   HEMOGLOBIN 12.5 13.8   HEMATOCRIT 38.5 42.1   MCV 95.5 94.0   MCH 31.0 30.8   RDW 51.2* " 51.9*   PLATELETCT 577* 556*   MPV 9.1 9.4   NEUTSPOLYS 60.90 78.70*   LYMPHOCYTES 26.40 12.80*   MONOCYTES 9.70 6.40   EOSINOPHILS 1.60 0.90   BASOPHILS 1.00 0.90         Recent Labs     02/10/24  1548 02/12/24  0902   SODIUM 135 138   POTASSIUM 3.5* 3.3*   CHLORIDE 106 106   CO2 13* 14*   BUN 6* 14   CREATININE 0.62 0.92   CALCIUM 9.0 8.5   MAGNESIUM  --  2.2   ALBUMIN 3.6 3.9       CULTURES:   Results       Procedure Component Value Units Date/Time    URINALYSIS [[id number]]  (Abnormal) Collected: 02/12/24 1008    Order Status: Completed Specimen: Urine Updated: 02/12/24 1035     Color Yellow     Character Clear     Specific Gravity 1.024     Ph 7.0     Glucose Negative mg/dL      Ketones Negative mg/dL      Protein 30 mg/dL      Bilirubin Negative     Urobilinogen, Urine 0.2     Nitrite Negative     Leukocyte Esterase Negative     Occult Blood Negative     Micro Urine Req Microscopic            IMAGES:  DX-ABDOMEN COMPLETE WITH AP OR PA CXR   Final Result      1.  Cholecystectomy.   2.  Nonobstructive 6 mm renal calculus lower pole left kidney concordant with CT findings.   3.  No evidence of acute bowel obstruction or other acute intra-abdominal disease process.   4.  No acute cardiopulmonary disease evident.          ASSESSMENT/PLAN: 53 y.o. female admitted for intractable nausea and vomiting.    Problem   Intractable Nausea and Vomiting   Thrombocytosis   Bipolar 1 Disorder (Hcc)   Severe Protein-Calorie Malnutrition (Hcc)   Hypokalemia   Epigastric Abdominal Pain   Arthritis, Rheumatoid (Hcc)   Alkaline Phosphatase Elevation       Bipolar 1 disorder (HCC)  Chronic, stable and well controlled on Seroquel 60mg and Paxil 100mg    Plan  -Cont home med regimen     Intractable nausea and vomiting  Chronic and recurring with most recent occurrence starting 8 days ago. Etiology unknown.  CMP in the ED significant for anion gap metabolic acidosis. Anion gap 18 and bicarb of 14 likely due to GI losses in the setting of  intractable nausea and vomiting.  Patient's previous admission on 2/7, GI was consulted for dilated common bile duct.  Patient improved after IV hydration and GI stated patient  had grossly normal liver, biliary labs, no plan to provide intervention follow-up, bile duct dilatation. Advised to slowly resume oral intake and also decrease the dose of pain medication.  The patient adamantly told GI that she will try to avoid further surgery or endoscopy. GI did not recommend any further interventions from their standpoint at that time.     Plan:   -will treat with zofran 4mg q4 hrs prn  -compazine 5-10mg q4 hrs if nausea unresolved with zofran  -phenergan 12.5-25mg q 4 hrs prn if unresolved with compazine  -MIVF LR at 100cc/hr  -repeat CMP in AM   -will cont to encourage PO intake   -will attempt to obtain outpatient GI records    Arthritis, rheumatoid (HCC)  Chronic, stable on percocet 10-325mg q4hrs prn    Plan  -cont home medication    Epigastric abdominal pain  8 day history of epigastric pain with associated dark stools.  Dx abdomen was significant for nonobstructive 6 mm renal calculus lower pole left kidney, otherwise within normal limits.  On review of Patient's previous admission on 2/7, GI was consulted for dilated common bile duct.  Patient improved after IV hydration and GI stated patient  had grossly normal liver, biliary labs, no plan to provide intervention follow-up, bile duct dilatation. Advised to slowly resume oral intake and also decrease the dose of pain medication.  The patient adamantly told GI that she will try to avoid further surgery or endoscopy. GI did not recommend any further interventions from their standpoint at that time.     Plan  -will treat with omeprazole 20mg BID.   -will attempt to obtain outpatient GI records to obtain further details regarding pts previous GI work-up     Hypokalemia  Potassium of 3.3 in ED today, asymptomatic.  Likely in the setting of GI losses from intractable  nausea and vomiting and chronic diarrhea, as well has poor oral intake.    Plan  -replete as indicated and recheck BMP at 2000    Severe protein-calorie malnutrition (HCC)  Patient is severely cachectic, with significant and prolonged history of poor oral intake, requiring G-tube placement secondary to intractable nausea and vomiting.    Plan  -nutrition consult  -will monitor phosphate and electrolytes due to high risk of refeeding syndrome     Thrombocytosis  Likely in the setting of smoking history, no evidence of acute thrombosis. Platelets at 577.     Plan  -cont to trend with CBC    Alkaline phosphatase elevation  Chronically elevated, most recent CMP 2/12, level was 218 up from 207 on 2/10    Plan   -order GTT to help evaluate for etiology     IV Fluids: MIVF LR at 100cc/hr  Antbx: none  DVT ppx: SCDs, holding pharmacologic prophylaxis in the setting of recent melena   Code status: DNR/DNI    Argenis Larson D.O., PGY-1  [facility]

## 2024-02-12 NOTE — ASSESSMENT & PLAN NOTE
Resolved, chronic, recurring, etiology unknown, high suspicion for PBC versus PSC in setting of elevated GGT and dilated bile ducts.    Plan:   -zofran 4mg q4 hrs prn  -compazine 5-10mg q4 hrs if nausea unresolved with zofran  -phenergan 12.5-25mg q 4 hrs prn if unresolved with compazine  -advancing pts diet as tolerated  -will attempt to obtain outpatient GI records

## 2024-02-12 NOTE — ED TRIAGE NOTES
Chief Complaint   Patient presents with    N/V     BIB EMS from home. Per report patient has been experiencing N/V for the past week. She has been seen here and admitted for this issue recently. Patient continues to express severe nausea and active vomiting.      Patient gowned and placed on monitors. Patient most recent episode of vomiting was this morning in the ambulance and reports it as green. Patient received 12.5 of promethazine IM which patient states helped significantly. Patient also received 100 mcg total of fentanyl for discomfort. Patient AOX4 and stable on RA.

## 2024-02-12 NOTE — ASSESSMENT & PLAN NOTE
Resolved, asymptomatic, likely secondary to hemoconcentration in setting of dehydration due to recurring vomiting.    Plan  -No additional interventions or workup indicated at this time

## 2024-02-12 NOTE — PROGRESS NOTES
Received report from Suresh PERDOMO. Patient arrived on floor via bed transport. Patient able to ambulate from bed to bed with one person assistance. Patient vital signs stable, patient states no pain at this time and no further needs. Skin check performed.

## 2024-02-12 NOTE — ED NOTES
Rounded on patient. Informed about the need for urine sample. Bed pan provided . Patient able to position self on bedpan. Urine sample obtained and sent to lab.

## 2024-02-12 NOTE — ED NOTES
Telephone report given to LEANNE Pino on [id number]. Patient is alert and aware of POC. She remains stable on RA with VSS. Transport present to move patient. All belongings with patient for transport.

## 2024-02-12 NOTE — ED PROVIDER NOTES
ED PHYSICIAN NOTE    CHIEF COMPLAINT  Chief Complaint   Patient presents with    N/V     BIB EMS from home. Per report patient has been experiencing N/V for the past week. She has been seen here and admitted for this issue recently. Patient continues to express severe nausea and active vomiting.        EXTERNAL RECORDS REVIEWED  Patient admitted to this hospital on 2/7 with intractable nausea vomiting.past medical history of chronic pain syndrome, GERD, rheumatoid arthritis requiring amputation of digits of the right hand, malnutrition, small bowel obstruction, substance abuse.  Recent CT scan and likely SMA syndrome.  GI was consulted.  Recommended supportive care.  After the patient's discharge she returned to ER the next day with persistent symptoms.  She was treated symptomatically.  Referred to GI.    HPI/ROS    OUTSIDE HISTORIAN(S):  Son reports that they have tried to make appointment with GI but she cannot be seen until April.  She has an appointment this week with her primary doctor.    Mary Martinez is a 53 y.o. female who presents nausea vomiting and abdominal pain.  Past history as mentioned above.  She has had recurrent episodes of nausea and vomiting.  She had intractable nausea to the degree that at 1 point she had a G-tube for feeding.  She gained weight and then was tolerating orals so the G-tube was removed.  He did well for few months but then her recurrent nausea vomiting and pain returned.  It has been particularly bad over the last week.  She was hospitalized on the seventh.  Return to the ER on the 10th.  She does well for about a day but then her pain and vomiting return.  She has not had a fever or chills.  Her pain is diffuse constant nonradiating.  She has normal bowel movements.  No pain with urination.    PAST MEDICAL HISTORY  Past Medical History:   Diagnosis Date    Acute renal failure (ARF) (HCC)     Acute renal failure (HCC) 10/06/2011    Allergy     Anemia     Anxiety      Arthritis     Rheumatoid -follows with rheumatologist. Has several fractures due to RA.    ASTHMA     inhalers prn    Blood transfusion without reported diagnosis     Bowel habit changes     20-constipation and diarrhea.    Bronchitis last approx     Chronic pain 2020    Due to RA    Dental disorder     no teeth upper-does not wear her denture. Broken teeth on bottom.    Depression     GERD (gastroesophageal reflux disease)     GIB (gastrointestinal bleeding)     Head ache     Heart burn     taking famotidine    Hiatal hernia     Hiatus hernia syndrome     History of pancreatitis     Hypokalemia     Hyponatremia     Idiopathic chronic pancreatitis (HCC)     Indigestion     taking famotidine    Intractable nausea and vomiting     Kidney disease     Leukocytosis 10/08/2011    Pain     CPS-everywhere    Pneumonia 10/2019    PONV (postoperative nausea and vomiting)     Psychiatric problem     anxiety and depression    Rheumatoid aortitis     Rheumatoid arthritis(714.0)     Severe protein-calorie malnutrition ([facility])     Small bowel obstruction ([facility]) 10/06/2011    SMAS (superior mesenteric artery syndrome) ([facility])     Substance abuse ([facility])     UTI (urinary tract infection) 2023    Vitamin B12 deficiency neuropathy ([facility])        SOCIAL HISTORY  Social History     Tobacco Use    Smoking status: Former     Current packs/day: 0.00     Average packs/day: 0.5 packs/day for 30.0 years (15.0 ttl pk-yrs)     Types: Cigarettes     Quit date: 2023     Years since quittin.1     Passive exposure: Never    Smokeless tobacco: Never   Vaping Use    Vaping Use: Never used   Substance Use Topics    Alcohol use: Never    Drug use: Never       CURRENT MEDICATIONS  Home Medications    **Home medications have not yet been reviewed for this encounter**         ALLERGIES  Allergies   Allergen Reactions    Penicillins Anaphylaxis and Hives     Tolerates cephalosporins; reports throat swelling with PCN    Aripiprazole  "Nausea     Spasms, shaking      Nitrous Oxide Vomiting       PHYSICAL EXAM  VITAL SIGNS: /72   Pulse 92   Resp 17   Ht 1.6 m (5' 3\")   Wt 39.9 kg (88 lb)   LMP 09/21/2011   SpO2 96%   BMI 15.59 kg/m²    Constitutional: Cachectic female appearing older than stated age  HENT: Normal inspection  Eyes: Normal inspection  Neck: Grossly normal range of motion.  Cardiovascular: Normal heart rate, Normal rhythm.  Symmetric peripheral pulses.   Thorax & Lungs: No respiratory distress, No wheezing, No rales, No rhonchi, No chest tenderness.   Abdomen: Midline surgical scar.  Mild diffuse tenderness.  No rebound or peritonitis.  Neurologic: Grossly normal   Psychiatric: Normal for situation     DIAGNOSTIC STUDIES / PROCEDURES  LABS/EKG  Results for orders placed or performed during the hospital encounter of 02/12/24   CBC WITH DIFFERENTIAL   Result Value Ref Range    WBC 12.8 (H) 4.8 - 10.8 K/uL    RBC 4.48 4.20 - 5.40 M/uL    Hemoglobin 13.8 12.0 - 16.0 g/dL    Hematocrit 42.1 37.0 - 47.0 %    MCV 94.0 81.4 - 97.8 fL    MCH 30.8 27.0 - 33.0 pg    MCHC 32.8 32.2 - 35.5 g/dL    RDW 51.9 (H) 35.9 - 50.0 fL    Platelet Count 556 (H) 164 - 446 K/uL    MPV 9.4 9.0 - 12.9 fL    Neutrophils-Polys 78.70 (H) 44.00 - 72.00 %    Lymphocytes 12.80 (L) 22.00 - 41.00 %    Monocytes 6.40 0.00 - 13.40 %    Eosinophils 0.90 0.00 - 6.90 %    Basophils 0.90 0.00 - 1.80 %    Immature Granulocytes 0.30 0.00 - 0.90 %    Nucleated RBC 0.00 0.00 - 0.20 /100 WBC    Neutrophils (Absolute) 10.04 (H) 1.82 - 7.42 K/uL    Lymphs (Absolute) 1.63 1.00 - 4.80 K/uL    Monos (Absolute) 0.81 0.00 - 0.85 K/uL    Eos (Absolute) 0.12 0.00 - 0.51 K/uL    Baso (Absolute) 0.11 0.00 - 0.12 K/uL    Immature Granulocytes (abs) 0.04 0.00 - 0.11 K/uL    NRBC (Absolute) 0.00 K/uL   COMP METABOLIC PANEL   Result Value Ref Range    Sodium 138 135 - 145 mmol/L    Potassium 3.3 (L) 3.6 - 5.5 mmol/L    Chloride 106 96 - 112 mmol/L    Co2 14 (L) 20 - 33 mmol/L    " Anion Gap 18.0 (H) 7.0 - 16.0    Glucose 102 (H) 65 - 99 mg/dL    Bun 14 8 - 22 mg/dL    Creatinine 0.92 0.50 - 1.40 mg/dL    Calcium 8.5 8.5 - 10.5 mg/dL    Correct Calcium 8.6 8.5 - 10.5 mg/dL    AST(SGOT) 7 (L) 12 - 45 U/L    ALT(SGPT) 10 2 - 50 U/L    Alkaline Phosphatase 218 (H) 30 - 99 U/L    Total Bilirubin 0.3 0.1 - 1.5 mg/dL    Albumin 3.9 3.2 - 4.9 g/dL    Total Protein 7.8 6.0 - 8.2 g/dL    Globulin 3.9 (H) 1.9 - 3.5 g/dL    A-G Ratio 1.0 g/dL   LIPASE   Result Value Ref Range    Lipase 29 11 - 82 U/L   URINALYSIS    Specimen: Urine   Result Value Ref Range    Color Yellow     Character Clear     Specific Gravity 1.024 <1.035    Ph 7.0 5.0 - 8.0    Glucose Negative Negative mg/dL    Ketones Negative Negative mg/dL    Protein 30 (A) Negative mg/dL    Bilirubin Negative Negative    Urobilinogen, Urine 0.2 Negative    Nitrite Negative Negative    Leukocyte Esterase Negative Negative    Occult Blood Negative Negative    Micro Urine Req Microscopic    ESTIMATED GFR   Result Value Ref Range    GFR (CKD-EPI) 75 >60 mL/min/1.73 m 2   URINE MICROSCOPIC (W/UA)   Result Value Ref Range    WBC 0-2 /hpf    RBC 2-5 (A) /hpf    Bacteria Negative None /hpf    Epithelial Cells Few /hpf    Hyaline Cast 0-2 /lpf   EKG (NOW)   Result Value Ref Range    Report       [facility]    Test Date:  2024  Pt Name:    STEFFANIE ELLISON                Department: ER  MRN:        [id number]                      Room:       [facility]  Gender:     Female                       Technician: [id number]  :        1970                   Requested By:ER TRIAGE PROTOCOL  Order #:    [id number]                    Reading MD: JONATHAN WILCOX MD    Measurements  Intervals                                Axis  Rate:       92                           P:          83  IL:         170                          QRS:        86  QRSD:       106                          T:          59  QT:         372  QTc:         461    Interpretive Statements  Sinus rhythm  Right atrial enlargement  Low voltage, extremity leads  Compared to ECG 01/06/2024 14:35:23  No significant changes  Electronically Signed On 02- 10:53:40 PST by JONATHAN WILCOX MD        I have independently interpreted this EKG as documented above  Rhythm strip interpretation-sinus rhythm      COURSE & MEDICAL DECISION MAKING    INITIAL ASSESSMENT, COURSE AND PLAN  Care Narrative: Patient presents with recurrent nausea vomiting abdominal pain.  Has had extensive workup previously.  Received antiemetic prior to arrival.  Was given morphine.  Given additional antiemetic.  Collect labs.  Obtain imaging.    Laboratory data shows increased WBC count unclear significance.  Has a acutely nonsurgical examination.  X-ray was negative.  Patient has anion gap acidosis and is dehydrated.    Patient persistently nauseous with vomiting.  She will need to be admitted to hospital for further treatment.  Consult family practice for admission.          ADDITIONAL PROBLEM LIST  Past Medical History:   Diagnosis Date    Acute renal failure (ARF) (HCC)     Acute renal failure (HCC) 10/06/2011    Allergy     Anemia     Anxiety     Arthritis     Rheumatoid -follows with rheumatologist. Has several fractures due to RA.    ASTHMA     inhalers prn    Blood transfusion without reported diagnosis     Bowel habit changes     4/24/20-constipation and diarrhea.    Bronchitis last approx 2018    Chronic pain 04/24/2020    Due to RA    Dental disorder     no teeth upper-does not wear her denture. Broken teeth on bottom.    Depression     GERD (gastroesophageal reflux disease)     GIB (gastrointestinal bleeding)     Head ache     Heart burn     taking famotidine    Hiatal hernia     Hiatus hernia syndrome     History of pancreatitis     Hypokalemia     Hyponatremia     Idiopathic chronic pancreatitis (HCC)     Indigestion     taking famotidine    Intractable nausea and vomiting     Kidney  disease     Leukocytosis 10/08/2011    Pain     CPS-everywhere    Pneumonia 10/2019    PONV (postoperative nausea and vomiting)     Psychiatric problem     anxiety and depression    Rheumatoid aortitis     Rheumatoid arthritis(714.0) 2003    Severe protein-calorie malnutrition (HCC)     Small bowel obstruction (HCC) 10/06/2011    SMAS (superior mesenteric artery syndrome) (HCC)     Substance abuse (HCC)     UTI (urinary tract infection) 05/25/2023    Vitamin B12 deficiency neuropathy (HCC)        DISPOSITION AND DISCUSSIONS  I have discussed management of the patient with the following physicians and HALIMA's: Dr. Landa    Escalation of care considered, and ultimately not performed: Considered CT imaging and ultrasound, but exam is nonsurgical.  Has had recent imaging.      FINAL IMPRESSION  1.  Intractable nausea vomiting    This dictation was created using voice recognition software. The accuracy of the dictation is limited to the abilities of the software. I expect there may be some errors of grammar and possibly content. The nursing notes were reviewed and certain aspects of this information were incorporated into this note.    Electronically signed by: Ignacio Person M.D., 2/12/2024

## 2024-02-12 NOTE — PROGRESS NOTES
4 Eyes Skin Assessment Completed by LEANNE Pino and LEANNE Modi.    Head WDL  Ears WDL  Nose WDL  Mouth WDL  Neck WDL  Breast/Chest WDL  Shoulder Blades WDL  Spine WDL  (R) Arm/Elbow/Hand WDL  (L) Arm/Elbow/Hand WDL  Abdomen Scar  Groin WDL  Scrotum/Coccyx/Buttocks WDL  (R) Leg WDL  (L) Leg WDL  (R) Heel/Foot/Toe Scab  (L) Heel/Foot/Toe WDL          Devices In Places NA      Interventions In Place N/A    Possible Skin Injury No    Pictures Uploaded Into Epic N/A  Wound Consult Placed N/A  RN Wound Prevention Protocol Ordered No

## 2024-02-13 ENCOUNTER — PATIENT OUTREACH (OUTPATIENT)
Dept: HEALTH INFORMATION MANAGEMENT | Facility: OTHER | Age: 54
End: 2024-02-13
Payer: MEDICAID

## 2024-02-13 LAB
ALBUMIN SERPL BCP-MCNC: 3 G/DL (ref 3.2–4.9)
ALBUMIN/GLOB SERPL: 1.1 G/DL
ALP SERPL-CCNC: 160 U/L (ref 30–99)
ALT SERPL-CCNC: 5 U/L (ref 2–50)
ANION GAP SERPL CALC-SCNC: 8 MMOL/L (ref 7–16)
AST SERPL-CCNC: 9 U/L (ref 12–45)
BASOPHILS # BLD AUTO: 0.5 % (ref 0–1.8)
BASOPHILS # BLD: 0.04 K/UL (ref 0–0.12)
BILIRUB SERPL-MCNC: 0.2 MG/DL (ref 0.1–1.5)
BUN SERPL-MCNC: 12 MG/DL (ref 8–22)
CALCIUM ALBUM COR SERPL-MCNC: 8.8 MG/DL (ref 8.5–10.5)
CALCIUM SERPL-MCNC: 8 MG/DL (ref 8.5–10.5)
CHLORIDE SERPL-SCNC: 114 MMOL/L (ref 96–112)
CO2 SERPL-SCNC: 17 MMOL/L (ref 20–33)
CREAT SERPL-MCNC: 0.67 MG/DL (ref 0.5–1.4)
EOSINOPHIL # BLD AUTO: 0.14 K/UL (ref 0–0.51)
EOSINOPHIL NFR BLD: 1.8 % (ref 0–6.9)
ERYTHROCYTE [DISTWIDTH] IN BLOOD BY AUTOMATED COUNT: 53 FL (ref 35.9–50)
GFR SERPLBLD CREATININE-BSD FMLA CKD-EPI: 105 ML/MIN/1.73 M 2
GGT SERPL-CCNC: 82 U/L (ref 7–34)
GLOBULIN SER CALC-MCNC: 2.8 G/DL (ref 1.9–3.5)
GLUCOSE SERPL-MCNC: 97 MG/DL (ref 65–99)
HCT VFR BLD AUTO: 29.1 % (ref 37–47)
HGB BLD-MCNC: 9.6 G/DL (ref 12–16)
IMM GRANULOCYTES # BLD AUTO: 0.03 K/UL (ref 0–0.11)
IMM GRANULOCYTES NFR BLD AUTO: 0.4 % (ref 0–0.9)
LYMPHOCYTES # BLD AUTO: 2.21 K/UL (ref 1–4.8)
LYMPHOCYTES NFR BLD: 28.3 % (ref 22–41)
MCH RBC QN AUTO: 31.7 PG (ref 27–33)
MCHC RBC AUTO-ENTMCNC: 33 G/DL (ref 32.2–35.5)
MCV RBC AUTO: 96 FL (ref 81.4–97.8)
MONOCYTES # BLD AUTO: 0.72 K/UL (ref 0–0.85)
MONOCYTES NFR BLD AUTO: 9.2 % (ref 0–13.4)
NEUTROPHILS # BLD AUTO: 4.67 K/UL (ref 1.82–7.42)
NEUTROPHILS NFR BLD: 59.8 % (ref 44–72)
NRBC # BLD AUTO: 0 K/UL
NRBC BLD-RTO: 0 /100 WBC (ref 0–0.2)
PHOSPHATE SERPL-MCNC: 2.1 MG/DL (ref 2.5–4.5)
PLATELET # BLD AUTO: 337 K/UL (ref 164–446)
PMV BLD AUTO: 9.5 FL (ref 9–12.9)
POTASSIUM SERPL-SCNC: 4.1 MMOL/L (ref 3.6–5.5)
PROT SERPL-MCNC: 5.8 G/DL (ref 6–8.2)
RBC # BLD AUTO: 3.03 M/UL (ref 4.2–5.4)
SODIUM SERPL-SCNC: 139 MMOL/L (ref 135–145)
WBC # BLD AUTO: 7.8 K/UL (ref 4.8–10.8)

## 2024-02-13 PROCEDURE — 700101 HCHG RX REV CODE 250

## 2024-02-13 PROCEDURE — A9270 NON-COVERED ITEM OR SERVICE: HCPCS

## 2024-02-13 PROCEDURE — 700111 HCHG RX REV CODE 636 W/ 250 OVERRIDE (IP): Mod: JZ

## 2024-02-13 PROCEDURE — 97163 PT EVAL HIGH COMPLEX 45 MIN: CPT

## 2024-02-13 PROCEDURE — 700102 HCHG RX REV CODE 250 W/ 637 OVERRIDE(OP)

## 2024-02-13 PROCEDURE — 80053 COMPREHEN METABOLIC PANEL: CPT

## 2024-02-13 PROCEDURE — 770006 HCHG ROOM/CARE - MED/SURG/GYN SEMI*

## 2024-02-13 PROCEDURE — 85025 COMPLETE CBC W/AUTO DIFF WBC: CPT

## 2024-02-13 PROCEDURE — 82977 ASSAY OF GGT: CPT

## 2024-02-13 PROCEDURE — 700105 HCHG RX REV CODE 258

## 2024-02-13 PROCEDURE — 700111 HCHG RX REV CODE 636 W/ 250 OVERRIDE (IP)

## 2024-02-13 PROCEDURE — 84100 ASSAY OF PHOSPHORUS: CPT

## 2024-02-13 PROCEDURE — 99232 SBSQ HOSP IP/OBS MODERATE 35: CPT | Mod: GC | Performed by: STUDENT IN AN ORGANIZED HEALTH CARE EDUCATION/TRAINING PROGRAM

## 2024-02-13 RX ORDER — HYDROMORPHONE HYDROCHLORIDE 1 MG/ML
0.5 INJECTION, SOLUTION INTRAMUSCULAR; INTRAVENOUS; SUBCUTANEOUS EVERY 4 HOURS PRN
Status: DISCONTINUED | OUTPATIENT
Start: 2024-02-13 | End: 2024-02-14 | Stop reason: HOSPADM

## 2024-02-13 RX ORDER — CALCIUM GLUCONATE 20 MG/ML
1 INJECTION, SOLUTION INTRAVENOUS ONCE
Status: COMPLETED | OUTPATIENT
Start: 2024-02-13 | End: 2024-02-13

## 2024-02-13 RX ADMIN — OXYCODONE AND ACETAMINOPHEN 1 TABLET: 10; 325 TABLET ORAL at 04:44

## 2024-02-13 RX ADMIN — QUETIAPINE FUMARATE 100 MG: 100 TABLET ORAL at 21:24

## 2024-02-13 RX ADMIN — PAROXETINE 60 MG: 30 TABLET, FILM COATED ORAL at 21:24

## 2024-02-13 RX ADMIN — HYDROMORPHONE HYDROCHLORIDE 0.5 MG: 1 INJECTION, SOLUTION INTRAMUSCULAR; INTRAVENOUS; SUBCUTANEOUS at 08:07

## 2024-02-13 RX ADMIN — OXYCODONE AND ACETAMINOPHEN 1 TABLET: 10; 325 TABLET ORAL at 21:40

## 2024-02-13 RX ADMIN — HYDROMORPHONE HYDROCHLORIDE 0.5 MG: 1 INJECTION, SOLUTION INTRAMUSCULAR; INTRAVENOUS; SUBCUTANEOUS at 12:08

## 2024-02-13 RX ADMIN — CALCIUM GLUCONATE 1 G: 20 INJECTION, SOLUTION INTRAVENOUS at 07:17

## 2024-02-13 RX ADMIN — LIDOCAINE HYDROCHLORIDE 15 ML: 20 SOLUTION ORAL; TOPICAL at 11:53

## 2024-02-13 RX ADMIN — ONDANSETRON 4 MG: 2 INJECTION INTRAMUSCULAR; INTRAVENOUS at 16:49

## 2024-02-13 RX ADMIN — OMEPRAZOLE 20 MG: 20 CAPSULE, DELAYED RELEASE ORAL at 05:32

## 2024-02-13 RX ADMIN — OXYCODONE AND ACETAMINOPHEN 1 TABLET: 10; 325 TABLET ORAL at 10:26

## 2024-02-13 RX ADMIN — HYDROMORPHONE HYDROCHLORIDE 0.5 MG: 1 INJECTION, SOLUTION INTRAMUSCULAR; INTRAVENOUS; SUBCUTANEOUS at 18:02

## 2024-02-13 RX ADMIN — OMEPRAZOLE 20 MG: 20 CAPSULE, DELAYED RELEASE ORAL at 17:55

## 2024-02-13 RX ADMIN — OXYCODONE AND ACETAMINOPHEN 1 TABLET: 10; 325 TABLET ORAL at 16:50

## 2024-02-13 RX ADMIN — SODIUM PHOSPHATE, MONOBASIC, MONOHYDRATE AND SODIUM PHOSPHATE, DIBASIC, ANHYDROUS 30 MMOL: 142; 276 INJECTION, SOLUTION INTRAVENOUS at 10:23

## 2024-02-13 SDOH — ECONOMIC STABILITY: INCOME INSECURITY: IN THE LAST 12 MONTHS, WAS THERE A TIME WHEN YOU WERE NOT ABLE TO PAY THE MORTGAGE OR RENT ON TIME?: NO

## 2024-02-13 SDOH — ECONOMIC STABILITY: FOOD INSECURITY: WITHIN THE PAST 12 MONTHS, THE FOOD YOU BOUGHT JUST DIDN'T LAST AND YOU DIDN'T HAVE MONEY TO GET MORE.: NEVER TRUE

## 2024-02-13 SDOH — ECONOMIC STABILITY: FOOD INSECURITY: WITHIN THE PAST 12 MONTHS, YOU WORRIED THAT YOUR FOOD WOULD RUN OUT BEFORE YOU GOT MONEY TO BUY MORE.: NEVER TRUE

## 2024-02-13 SDOH — ECONOMIC STABILITY: INCOME INSECURITY: HOW HARD IS IT FOR YOU TO PAY FOR THE VERY BASICS LIKE FOOD, HOUSING, MEDICAL CARE, AND HEATING?: NOT HARD AT ALL

## 2024-02-13 ASSESSMENT — COGNITIVE AND FUNCTIONAL STATUS - GENERAL
MOVING FROM LYING ON BACK TO SITTING ON SIDE OF FLAT BED: A LITTLE
STANDING UP FROM CHAIR USING ARMS: A LITTLE
MOBILITY SCORE: 18
WALKING IN HOSPITAL ROOM: A LITTLE
CLIMB 3 TO 5 STEPS WITH RAILING: A LOT
MOVING TO AND FROM BED TO CHAIR: A LITTLE
SUGGESTED CMS G CODE MODIFIER MOBILITY: CK

## 2024-02-13 ASSESSMENT — GAIT ASSESSMENTS
GAIT LEVEL OF ASSIST: CONTACT GUARD ASSIST
DISTANCE (FEET): 30
ASSISTIVE DEVICE: FRONT WHEEL WALKER

## 2024-02-13 ASSESSMENT — PAIN DESCRIPTION - PAIN TYPE
TYPE: ACUTE PAIN

## 2024-02-13 NOTE — PROGRESS NOTES
Assumed care of patient at 1900. Received report from day RN. Patient A&Ox4, on RA, Reporting a pain level of 10/10 in abdomen. Pt stated the percocet does not work for her and will not take it, pt stated she will leave if another medication is not added. MD notified, MD stated he would come to bedside to speak to pt. Pt informed, verbally understood.  Call light within reach, belongings within reach, Fall precautions in place, bed in lowest position.      2140: MD spoke with pt and stated he would add dilaudid and that pt does want to take percocet now. Pt medicated as per MAR. Informed pt this RN will bring dilaudid once order is placed and verified. Pt verbally understands.

## 2024-02-13 NOTE — PROGRESS NOTES
Attempted to call GI consultants to retrieve additional past medical history given the patient's elevated GGT value during present admission and chronic history of elevated alk phos, concerning for PBC.  Unable to get through, was able to leave a voicemail requesting for a phone call to be returned.

## 2024-02-13 NOTE — CARE PLAN
"The patient is Stable - Low risk of patient condition declining or worsening    Shift Goals  Clinical Goals: Patient's pain will remain under 6/10 for remainder of shift  Patient Goals: Pain control  Family Goals: reno    Progress made toward(s) clinical / shift goals:      Problem: Fall Risk  Goal: Patient will remain free from falls  Outcome: Progressing    Patient began the shift by wanting to get up to the bathroom with walker. She was able to get to the bathroom once via walker and patient requested to use a bedpan instead because she felt too tired and did not want to fall. No falls this shift.       Patient is not progressing towards the following goals:    Problem: Pain - Standard  Goal: Alleviation of pain or a reduction in pain to the patient’s comfort goal  Outcome: Not Progressing    Patient stated \" if I wanted to be in pain I would have stayed at home\". Patient pain is 9/10 after medicated pain is 7/10.     "

## 2024-02-13 NOTE — DISCHARGE PLANNING
Community Health Worker Intake    Identified no barriers.  No resources provided.  Contact information provided to Mary Martinez   Has PCP appointment scheduled for 2/16/24  Inpatient assessment completed.    CHW met with pt at bedside to introduce Community Care Management.   Pt states she lives in a house with two of her five children. The other three children are always coming and going. Pt is an active , and has a working vehicle. Pt states she is active with her PCP [place] Aguilera M.D. and already has a follow up appointment with his office scheduled for 2/16/24.  Pt reports having no barriers to food or medications. Pt uses multiple forms of DME at home including a walker, cane, wheelchair and bathroom assistance implements.     Plan:  Will follow up with pt post discharge to insure no barriers have come up.  Follow up appointment reminder put in follow up section of AVS

## 2024-02-13 NOTE — PROGRESS NOTES
[facility] [place] [facility] PROGRESS NOTE        Attending:   Matthew Quinonez MD    Resident:   Argenis Larson D.O.    PATIENT:   Mary Martinez; [id number]; 1970    ID:   53 y.o. female admitted for intractable nausea and vomiting.     SUBJECTIVE:   No acute events overnight, patient reports persistent epigastric pain that is mildly controlled with Dilaudid.  She states that she is hungry and has not had an episode of vomiting since being admitted yesterday. Denies any fever, chills, chest pain or SOB.     OBJECTIVE:  Vitals:    02/12/24 1730 02/12/24 1951 02/13/24 0409 02/13/24 0812   BP:  94/68 92/61 (!) 89/62   Pulse:  81 83 71   Resp:  16 16 16   Temp:  35.9 °C (96.7 °F) 36.4 °C (97.5 °F) 36.8 °C (98.3 °F)   TempSrc:  Temporal Temporal Temporal   SpO2:  95% 94% 96%   Weight: 39.9 kg (88 lb)      Height:         No intake or output data in the 24 hours ending 02/13/24 0633    PHYSICAL EXAM:  General: No acute distress, afebrile, resting comfortably  HEENT: NC/AT. EOMI.   Cardiovascular: RRR without murmurs. Normal capillary refill   Respiratory: CTAB  Abdomen:  Abdomen is scaphoid. Soft, Mild tenderness to RLQ with some voluntary guarding. +BS. No masses noted   EXT:  VÁSQUEZ, no edema  Skin: No erythema/lesions   Neuro: Non-focal    LABS:  Recent Labs     02/10/24  1548 02/12/24  0902 02/13/24  0603   WBC 6.8 12.8* 7.8   RBC 4.03* 4.48 3.03*   HEMOGLOBIN 12.5 13.8 9.6*   HEMATOCRIT 38.5 42.1 29.1*   MCV 95.5 94.0 96.0   MCH 31.0 30.8 31.7   RDW 51.2* 51.9* 53.0*   PLATELETCT 577* 556* 337   MPV 9.1 9.4 9.5   NEUTSPOLYS 60.90 78.70* 59.80   LYMPHOCYTES 26.40 12.80* 28.30   MONOCYTES 9.70 6.40 9.20   EOSINOPHILS 1.60 0.90 1.80   BASOPHILS 1.00 0.90 0.50     Recent Labs     02/10/24  1548 02/12/24  0902 02/12/24 2013 02/13/24  0320   SODIUM 135 138 134* 139   POTASSIUM 3.5* 3.3* 4.3 4.1   CHLORIDE 106 106 108 114*   CO2 13* 14* 16* 17*   BUN 6* 14 16 12   CREATININE 0.62 0.92 0.82 0.67   CALCIUM 9.0 8.5 8.1* 8.0*  "  MAGNESIUM  --  2.2  --   --    PHOSPHORUS  --   --   --  2.1*   ALBUMIN 3.6 3.9  --  3.0*     Estimated GFR/CRCL = CrCl cannot be calculated (Unknown ideal weight.).  Recent Labs     02/12/24  0902 02/12/24 2013 02/13/24  0320   GLUCOSE 102* 117* 97     Recent Labs     02/10/24  1548 02/12/24  0902 02/13/24  0320   ASTSGOT 17 7* 9*   ALTSGPT <5 10 5   TBILIRUBIN 0.2 0.3 0.2   ALKPHOSPHAT 206* 218* 160*   GLOBULIN 4.3* 3.9* 2.8             No results for input(s): \"INR\", \"APTT\", \"FIBRINOGEN\" in the last 72 hours.    Invalid input(s): \"DIMER\"      IMAGING:  DX-ABDOMEN COMPLETE WITH AP OR PA CXR   Final Result      1.  Cholecystectomy.   2.  Nonobstructive 6 mm renal calculus lower pole left kidney concordant with CT findings.   3.  No evidence of acute bowel obstruction or other acute intra-abdominal disease process.   4.  No acute cardiopulmonary disease evident.          MEDS:  Current Facility-Administered Medications   Medication Last Admin    HYDROmorphone (Dilaudid) injection 0.5 mg 0.5 mg at 02/13/24 1208    sodium phosphate 30 mmol in dextrose 5% 500 mL ivpb 30 mmol at 02/13/24 1023    omeprazole (PriLOSEC) capsule 20 mg 20 mg at 02/13/24 0532    ondansetron (Zofran) syringe/vial injection 4 mg      ondansetron (Zofran ODT) dispertab 4 mg 4 mg at 02/12/24 1712    promethazine (Phenergan) tablet 12.5-25 mg      promethazine (Phenergan) suppository 12.5-25 mg      prochlorperazine (Compazine) injection 5-10 mg      labetalol (Normodyne/Trandate) injection 10 mg      acetaminophen (Tylenol) tablet 1,000 mg      senna-docusate (Pericolace Or Senokot S) 8.6-50 MG per tablet 2 Tablet      And    polyethylene glycol/lytes (Miralax) Packet 1 Packet      oxyCODONE-acetaminophen (Percocet-10)  MG per tablet 1 Tablet 1 Tablet at 02/13/24 1026    PARoxetine (Paxil) tablet 60 mg 60 mg at 02/12/24 2051    QUEtiapine (SEROquel) tablet 100 mg 100 mg at 02/12/24 2052       ASSESSMENT/PLAN:    * Intractable nausea and " vomiting- (present on admission)  Assessment & Plan  Chronic and recurring with most recent occurrence starting 8 days ago. Etiology unknown.  CMP in the ED significant for anion gap metabolic acidosis. Anion gap 18 and bicarb of 14 likely due to GI losses in the setting of intractable nausea and vomiting.  Patient's previous admission on 2/7, GI was consulted for dilated common bile duct.  Patient improved after IV hydration and GI stated patient  had grossly normal liver, biliary labs, no plan to provide intervention follow-up, bile duct dilatation. Advised to slowly resume oral intake and also decrease the dose of pain medication.  The patient adamantly told GI that she will try to avoid further surgery or endoscopy. GI did not recommend any further interventions from their standpoint at that time.     Plan:   -will treat with zofran 4mg q4 hrs prn  -compazine 5-10mg q4 hrs if nausea unresolved with zofran  -phenergan 12.5-25mg q 4 hrs prn if unresolved with compazine  -advancing pts diet as tolerated  -will attempt to obtain outpatient GI records    Thrombocytosis- (present on admission)  Assessment & Plan  Likely in the setting of smoking history, no evidence of acute thrombosis. Platelets at 577.     Plan  -cont to trend with CBC    Bipolar 1 disorder (HCC)  Assessment & Plan  Chronic, stable and well controlled on Seroquel 60mg and Paxil 100mg    Plan  -Cont home med regimen     Hypokalemia- (present on admission)  Assessment & Plan  Potassium of 3.3 on admission, asymptomatic.  Likely in the setting of GI losses from intractable nausea and vomiting and chronic diarrhea, as well has poor oral intake. Now stable at 4.1 following replenishment.     Plan  -cont to monitor with AM CMP    Severe protein-calorie malnutrition (HCC)- (present on admission)  Assessment & Plan  Patient is severely cachectic, with significant and prolonged history of poor oral intake, requiring G-tube placement secondary to intractable  nausea and vomiting.    Plan  -nutrition consult  -will monitor phosphate and electrolytes due to high risk of refeeding syndrome     Epigastric abdominal pain- (present on admission)  Assessment & Plan  8 day history of epigastric pain with associated dark stools.  Dx abdomen was significant for nonobstructive 6 mm renal calculus lower pole left kidney, otherwise within normal limits.  On review of Patient's previous admission on 2/7, GI was consulted for dilated common bile duct.  Patient improved after IV hydration and GI stated patient  had grossly normal liver, biliary labs, no plan to provide intervention follow-up, bile duct dilatation. Advised to slowly resume oral intake and also decrease the dose of pain medication.  The patient adamantly told GI that she will try to avoid further surgery or endoscopy. GI did not recommend any further interventions from their standpoint at that time.     Plan  -will treat with omeprazole 20mg BID.   -will treat with GI cocktail for continued epigastric pain   -will attempt to obtain outpatient GI records to obtain further details regarding pts previous GI work-up     Arthritis, rheumatoid (HCC)  Assessment & Plan  Chronic, stable on percocet 10-325mg q4hrs prn    Plan  -cont home medication    Alkaline phosphatase elevation- (present on admission)  Assessment & Plan  Chronically elevated, most recent CMP 2/12, level was 218 up from 207 on 2/10. GGT mildly elevated which indicates likely liver etiology.     Plan:   -attempt to reach pt's outpatient GI to evaluate if pt has had outpatient work-up for possible PSC or PBS         IV Fluids: none  Antbx: none  DVT ppx: SCDs, holding pharmacologic prophylaxis in the setting of recent melena   Code status: DNR/DNI    Disposition: Inpatient for management of intractable nausea and vomiting with associated metabolic acidosis    Argenis Larson, DO  PGY-1, [facility]

## 2024-02-13 NOTE — DIETARY
"Nutrition services: Day 1 of admit.  Mary Martinez is a 53 y.o. female with admitting DX of intractable n/v.    Consult received for failure to thrive and BMI <19.    Pt recently assessed by [facility] RD on 2/8/24. Pt reported eating 4/5 small meals per day, feeling like she eats well, but is not able to gain wt. Pt requested small meals. Noted not liking ONSs. RD noted severe fat loss in the orbital region and severe muscle loss in the temples.     Assessment:  Height: 160 cm (5' 3\")  Weight: 39.9 kg (88 lb) stated by pt upon admit.  Body mass index is 15.59 kg/m²., BMI classification: underweight  Diet/Intake: Regular / PO intake per ADLs has been 50-75% x 2 meals    Evaluation:   Pt presented to ED w/ c/o N/V x the past week, having been admitted for the same complaint on 2/7. PMHx includes malnutrition, SBO, G-tube placement, GERD.  Per chart review, pt has maintained a steady wt x ~8 months following a sudden 30+lb wt loss.  Wt Readings from Last 25 Encounters:   02/12/24 39.9 kg (88 lb)   02/10/24 40.4 kg (89 lb)   02/07/24 40.5 kg (89 lb 4.6 oz)   01/06/24 40.2 kg (88 lb 10 oz)   11/13/23 43 kg (94 lb 12.8 oz)   10/26/23 38.6 kg (85 lb 3.2 oz)   10/03/23 40.1 kg (88 lb 8 oz)   09/20/23 39 kg (86 lb)   09/14/23 39.2 kg (86 lb 6.7 oz)   08/24/23 40.1 kg (88 lb 6.5 oz)   08/16/23 39.2 kg (86 lb 6.7 oz)   07/27/23 37.6 kg (83 lb)   06/29/23 36.3 kg (80 lb)   06/27/23 46 kg (101 lb 6.6 oz)   06/26/23 42.6 kg (93 lb 14.7 oz)   06/10/23 38.7 kg (85 lb 5.1 oz)   05/03/23 44.5 kg (98 lb 3.2 oz)   04/19/23 45.4 kg (100 lb)   04/07/23 46.6 kg (102 lb 12.8 oz)   03/31/23 48.5 kg (107 lb)   03/26/23 46.9 kg (103 lb 6.3 oz)   03/22/23 48.9 kg (107 lb 12.9 oz)   03/07/23 49.5 kg (109 lb 3.2 oz)   02/24/23 47.7 kg (105 lb 3.2 oz)   02/19/23 49.8 kg (109 lb 12.6 oz)   Pt's meal and snack preferences pre-loaded into [employer]  program.  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 Pt with 20.8% weight loss x 3 months last year and has been unable to regain weight.\"    Recommendations/Plan:  Add 6 small meals modifier to diet.   Encourage intake of >50% of meals and snacks.  Document intake of all meals and snacks as % taken in ADL's to provide interdisciplinary communication across all shifts.   Monitor weight.  Nutrition rep will continue to see patient for ongoing meal and snack preferences.       RD following  "

## 2024-02-13 NOTE — DISCHARGE PLANNING
Care Transition Team Assessment    Preferred Emergency contact is son Lobo Martinez ([phone])     CM RN met with pt at bedside to complete discharge assessment. Pt is A/Ox4 and agreeable with assessment. Pt verified information on face sheet.     - Prior to admission, pt was living in a 1 story house with her 3 adult sons Efrain Diamond ([phone]), and Dipika Martinez at the address provided on face sheet.   - Per pt, sons are good support for her upon discharge back into the community and can provide her with transportation home upon discharge.   - Per pt, she is mostly independent with ADL/IADLs, but does not drive. Sons assist with ADL and transportation as needed.   - DME owned includes cane, FWW, and wheelchair. No O2 needs prior to admission.   - Pt is Disabled and insured through Medicaid FFS   - PCP is West Aguilera M.D.   - [facility] [address] [place] [address]    Information Source  Orientation Level: (P) Oriented X4  Information Given By: Patient  Who is responsible for making decisions for patient? : Patient    Readmission Evaluation  Is this a readmission?: Yes - unplanned readmission    Elopement Risk  Legal Hold: (P) No  Ambulatory or Self Mobile in Wheelchair: (P) Yes  Disoriented: (P) No  Psychiatric Symptoms: (P) None  History of Wandering: (P) No  Elopement this Admit: (P) No  Vocalizing Wanting to Leave: (P) No  Displays Behaviors, Body Language Wanting to Leave: (P) No-Not at Risk for Elopement  Elopement Risk: (P) Not at Risk for Elopement    Interdisciplinary Discharge Planning  Lives with - Patient's Self Care Capacity: Adult Children, Child Less than 18 Years of Age  Patient or legal guardian wants to designate a caregiver: No  Support Systems: Friends / Neighbors, Parent, Spouse / Significant Other  Housing / Facility: 1 Story House    Discharge Preparedness  What is your plan after discharge?: Home with help  What are your discharge  supports?: Child  Prior Functional Level: Ambulatory, Needs Assist with Activities of Daily Living, Independent with Medication Management, Uses Cane, Uses Walker, Uses Wheelchair  Difficulity with IADLs: Driving    Functional Assesment  Prior Functional Level: Ambulatory, Needs Assist with Activities of Daily Living, Independent with Medication Management, Uses Cane, Uses Walker, Uses Wheelchair    Finances  Financial Barriers to Discharge: No  Prescription Coverage: Yes    Vision / Hearing Impairment  Vision Impairment : Yes  Right Eye Vision: (P) Impaired, Wears Glasses  Left Eye Vision: (P) Impaired, Wears Glasses  Hearing Impairment : No    Advance Directive  Advance Directive?: None    Domestic Abuse  Have you ever been the victim of abuse or violence?: No  Physical Abuse or Sexual Abuse: No  Verbal Abuse or Emotional Abuse: No    Discharge Risks or Barriers  Discharge risks or barriers?: Complex medical needs    Anticipated Discharge Information  Discharge Disposition: Discharged to home/self care (01)  Discharge Address: [address] [place] [id number]  Discharge Contact Phone Number: [phone]

## 2024-02-13 NOTE — CARE PLAN
The patient is Stable - Low risk of patient condition declining or worsening    Shift Goals  Clinical Goals: pts pain will be controlled to pts pain goal of 2-3/10 throughout shift, pt will remain free from falls  Patient Goals: pain control  Family Goals: reno    Progress made toward(s) clinical / shift goals:  provider contacted and spoke with pt at bedside regarding pain medication, pt pain controlled with dilaudid and norco, pt calls appropriately.    Patient is not progressing towards the following goals:

## 2024-02-13 NOTE — PROGRESS NOTES
Arianna from lab called with drop in hgb from 13.8 to 9.8. ordered redraw and new hgb is 9.6. MD paged.

## 2024-02-13 NOTE — THERAPY
Physical Therapy   Initial Evaluation     Patient Name: Mary Martinez  Age:  53 y.o., Sex:  female  Medical Record #: [id number]  Today's Date: 2/13/2024     Precautions  Precautions: Fall Risk    Assessment  Patient is 53 y.o. female who resides  family in a single level home, pt recently discharged for hospital to home.   Pt w/ h/o recurrent intractable nausea/vomiting, chronic pain syndrome, GERD, rheumatoid arthritis, severe protein calorie malnutrition, SBO, substance abuse and likely SMA syndrome, admitted for intractable nausea/vomiting.     Pt presenting with generalized weakness, decreased activity tolerance, and pain. Pt will benefit from continued PT while in house. Recommend HH  services upon discharge. No new DME needs identified    Plan    Physical Therapy Initial Treatment Plan   Treatment Plan : Equipment, Gait Training, Manual Therapy, Neuro Re-Education / Balance, Therapeutic Activities, Therapeutic Exercise  Treatment Frequency: 3 Times per Week  Duration: Until Therapy Goals Met    DC Equipment Recommendations: None  Discharge Recommendations: Recommend home health for continued physical therapy services         Initial Contact Note    Initial Contact Note Order Received and Verified, Physical Therapy Evaluation in Progress with Full Report to Follow.   Precautions   Precautions Fall Risk   Pain 0 - 10 Group   Location Abdomen   Location Orientation Mid   Therapist Pain Assessment Post Activity Pain Same as Prior to Activity;Nurse Notified;7   Prior Living Situation   Prior Services Intermittent Physical Support for ADL Per Family   Housing / Facility 1 Story House   Steps Into Home 1   Steps In Home 0   Bathroom Set up Bathtub / Shower Combination;Grab Bars   Equipment Owned 4-Wheel Walker;Single Point Cane;Wheelchair;Grab Bar(s) In Tub / Shower;Raised Toilet Seat Without Arms   Lives with - Patient's Self Care Capacity Adult Children;Child Less than 18 Years of Age   Prior Level of  Functional Mobility   Bed Mobility Independent   Transfer Status Independent   Ambulation Independent   Ambulation Distance short household   Assistive Devices Used Front-Wheel Walker   Cognition    Level of Consciousness Alert   Comments pleasant   Active ROM Upper Body   Dominant Hand Right   Comments jt deformities and right finger amputions.   Strength Lower Body   Gross Strength Generalized Weakness, Equal Bilaterally   Balance Assessment   Sitting Balance (Static) Good   Sitting Balance (Dynamic) Fair +   Standing Balance (Static) Fair +   Standing Balance (Dynamic) Fair   Weight Shift Sitting Good   Weight Shift Standing Fair   Comments w/FWW   Bed Mobility    Supine to Sit Supervised   Sit to Supine Supervised   Scooting Supervised   Gait Analysis   Gait Level Of Assist Contact Guard Assist   Assistive Device Front Wheel Walker   Distance (Feet) 30   # of Times Distance was Traveled 1   Deviation Antalgic;Decreased Base Of Support;Step To;Shuffled Gait;Decreased Heel Strike;Decreased Toe Off   Weight Bearing Status no restrictions.   Comments requires encouragement to participate in gait   Functional Mobility   Sit to Stand Contact Guard Assist   Bed, Chair, Wheelchair Transfer Contact Guard Assist   Transfer Method Stand Step   How much difficulty does the patient currently have...   Turning over in bed (including adjusting bedclothes, sheets and blankets)? 4   Sitting down on and standing up from a chair with arms (e.g., wheelchair, bedside commode, etc.) 3   Moving from lying on back to sitting on the side of the bed? 3   How much help from another person does the patient currently need...   Moving to and from a bed to a chair (including a wheelchair)? 3   Need to walk in a hospital room? 3   Climbing 3-5 steps with a railing? 2   6 clicks Mobility Score 18

## 2024-02-13 NOTE — PROGRESS NOTES
Pt arrived to unit via gurney at 2230. Pt able to transport to bed via hand held assist. Pt A&Ox4, on RA, reporting a neck pain level of 7/10. No PRN pain medication available in the MAR, provider notified. Admission profile complete, skin check complete. Pt oriented to room and call light system. Bed in lowest and locked position, call light within reach, pt declines any further needs at this time.

## 2024-02-14 VITALS
DIASTOLIC BLOOD PRESSURE: 53 MMHG | WEIGHT: 88 LBS | OXYGEN SATURATION: 95 % | SYSTOLIC BLOOD PRESSURE: 91 MMHG | RESPIRATION RATE: 16 BRPM | TEMPERATURE: 96.8 F | HEART RATE: 75 BPM | HEIGHT: 63 IN | BODY MASS INDEX: 15.59 KG/M2

## 2024-02-14 PROBLEM — E87.6 HYPOKALEMIA: Status: RESOLVED | Noted: 2022-08-27 | Resolved: 2024-02-14

## 2024-02-14 PROBLEM — D75.839 THROMBOCYTOSIS: Status: RESOLVED | Noted: 2023-03-22 | Resolved: 2024-02-14

## 2024-02-14 LAB
ANION GAP SERPL CALC-SCNC: 11 MMOL/L (ref 7–16)
BUN SERPL-MCNC: 6 MG/DL (ref 8–22)
CALCIUM SERPL-MCNC: 7.8 MG/DL (ref 8.5–10.5)
CHLORIDE SERPL-SCNC: 108 MMOL/L (ref 96–112)
CO2 SERPL-SCNC: 18 MMOL/L (ref 20–33)
CREAT SERPL-MCNC: 0.65 MG/DL (ref 0.5–1.4)
ERYTHROCYTE [DISTWIDTH] IN BLOOD BY AUTOMATED COUNT: 53.5 FL (ref 35.9–50)
GFR SERPLBLD CREATININE-BSD FMLA CKD-EPI: 106 ML/MIN/1.73 M 2
GLUCOSE SERPL-MCNC: 95 MG/DL (ref 65–99)
HCT VFR BLD AUTO: 31.4 % (ref 37–47)
HGB BLD-MCNC: 10.3 G/DL (ref 12–16)
MAGNESIUM SERPL-MCNC: 2 MG/DL (ref 1.5–2.5)
MCH RBC QN AUTO: 31.8 PG (ref 27–33)
MCHC RBC AUTO-ENTMCNC: 32.8 G/DL (ref 32.2–35.5)
MCV RBC AUTO: 96.9 FL (ref 81.4–97.8)
PHOSPHATE SERPL-MCNC: 2.8 MG/DL (ref 2.5–4.5)
PLATELET # BLD AUTO: 297 K/UL (ref 164–446)
PMV BLD AUTO: 9.8 FL (ref 9–12.9)
POTASSIUM SERPL-SCNC: 3.7 MMOL/L (ref 3.6–5.5)
RBC # BLD AUTO: 3.24 M/UL (ref 4.2–5.4)
SODIUM SERPL-SCNC: 137 MMOL/L (ref 135–145)
WBC # BLD AUTO: 6.9 K/UL (ref 4.8–10.8)

## 2024-02-14 PROCEDURE — 700111 HCHG RX REV CODE 636 W/ 250 OVERRIDE (IP)

## 2024-02-14 PROCEDURE — 97535 SELF CARE MNGMENT TRAINING: CPT

## 2024-02-14 PROCEDURE — 84100 ASSAY OF PHOSPHORUS: CPT

## 2024-02-14 PROCEDURE — 83735 ASSAY OF MAGNESIUM: CPT

## 2024-02-14 PROCEDURE — 99238 HOSP IP/OBS DSCHRG MGMT 30/<: CPT | Mod: GC | Performed by: STUDENT IN AN ORGANIZED HEALTH CARE EDUCATION/TRAINING PROGRAM

## 2024-02-14 PROCEDURE — A9270 NON-COVERED ITEM OR SERVICE: HCPCS

## 2024-02-14 PROCEDURE — 700102 HCHG RX REV CODE 250 W/ 637 OVERRIDE(OP)

## 2024-02-14 PROCEDURE — 80048 BASIC METABOLIC PNL TOTAL CA: CPT

## 2024-02-14 PROCEDURE — 85027 COMPLETE CBC AUTOMATED: CPT

## 2024-02-14 RX ORDER — ONDANSETRON 4 MG/1
4 TABLET, ORALLY DISINTEGRATING ORAL EVERY 6 HOURS PRN
Qty: 10 TABLET | Refills: 3 | Status: SHIPPED | OUTPATIENT
Start: 2024-02-14 | End: 2024-02-16 | Stop reason: SDUPTHER

## 2024-02-14 RX ADMIN — OXYCODONE AND ACETAMINOPHEN 1 TABLET: 10; 325 TABLET ORAL at 02:03

## 2024-02-14 RX ADMIN — OMEPRAZOLE 20 MG: 20 CAPSULE, DELAYED RELEASE ORAL at 05:36

## 2024-02-14 RX ADMIN — OXYCODONE AND ACETAMINOPHEN 1 TABLET: 10; 325 TABLET ORAL at 07:40

## 2024-02-14 RX ADMIN — HYDROMORPHONE HYDROCHLORIDE 0.5 MG: 1 INJECTION, SOLUTION INTRAMUSCULAR; INTRAVENOUS; SUBCUTANEOUS at 08:39

## 2024-02-14 RX ADMIN — OXYCODONE AND ACETAMINOPHEN 1 TABLET: 10; 325 TABLET ORAL at 11:40

## 2024-02-14 ASSESSMENT — PAIN DESCRIPTION - PAIN TYPE
TYPE: ACUTE PAIN

## 2024-02-14 NOTE — PROGRESS NOTES
"Assumed care of patient at 1900. Received report from day RN. Patient A&Ox4, on RA, Reporting a pain level of 9/10. Pt BP 83/55, MD notified at 2018. MD stated to recheck, rechecks done with pt in different position, latest check 93/62, MAP 72, per MD okay to continue normal checks per protocol if MAP remains above 65. Call light within reach, belongings within reach, Fall precautions in place, bed in lowest position. Patient does not have any other needs at this time.     0300: reassessed pt pain after percocet administration, pt stated her pain was a 9/10 and was requesting dilaudid, discussed with pt of the concern regarding dilaudid administration and her low BP,  pt continued to ask for dilaudid, messaged MD, MD stated to hold the dilaudid due to her BP and offered to speak to pt. Informed pt of MD decision and offer to speak to her, pt stated \"forget it, speaking to him wont make a difference so whatever.\" Informed the pt it is important for her to feel heard and that the MD would be willing to discuss the issue, pt continued to repeat \"theres no point, I dont need to talk to him, nothing will change.\"      "

## 2024-02-14 NOTE — THERAPY
Occupational Therapy Contact Note    Patient Name: Mary Martinez  Age:  53 y.o., Sex:  female  Medical Record #: [id number]  Today's Date: 2/14/2024    Discussed missed therapy with RN        02/14/24 [id number]   Initial Contact Note    Initial Contact Note Order Received and Verified. Occupational Therapy Evaluation NOT Completed Because Patient Does Not Require Acute Occupational Therapy at this Time.   Interdisciplinary Plan of Care Collaboration   Collaboration Comments OT eval attempted, however pt was actively preparing for dc. Met w/pt to discussed ADL's and home safety pt reports she does fall often and has difficulty w/ADL's but that her children assist as needed. Discussed HH and fall prevention. Pt reports having a PCP appointment coming up. Pt was also asking about a motorized scooter encouraged pt to discuss w/PCP and that she can ask about HH from PCP as well   Session Information   Date / Session Number  2/14 edu only

## 2024-02-14 NOTE — PROGRESS NOTES
Discharge order received from MD. Patient agreeable to discharge from [facility]. IV removed with tip intact. Patient dressed in personal clothing; all personal belongings present and accounted for. Patient transported of unit @12:10. Patient's spouse to drive home.

## 2024-02-14 NOTE — CARE PLAN
The patient is Stable - Low risk of patient condition declining or worsening    Shift Goals  Clinical Goals: pts pain controlled down to pts pain goal of 2-3/10  Patient Goals: sleep  Family Goals: NAIN    Progress made toward(s) clinical / shift goals:  Pt remains free from falls, pt refused bed alarm but calls appropriately    Patient is not progressing towards the following goals: Pts pain not controlled down to pain goal due to issues regarding pts low BP, notified MD, MD stated to hold dilaudid due to BP and offered to speak to pt at the bedside, pt refused to speak to provider.       Problem: Pain - Standard  Goal: Alleviation of pain or a reduction in pain to the patient’s comfort goal  Outcome: Not Progressing

## 2024-02-14 NOTE — DISCHARGE SUMMARY
"Admit Date:  2024       Discharge Date:   2024    Service:   [facility] Inpatient Team  Attending Physician(s):   Matthew Quinonez M.D.       Senior Resident(s):   Stormy Gotti M.D.  Ryan Resident(s):   Argenis Larson D.O.    Primary Diagnosis:   Intractable nausea and vomiting  Epigastric abdominal pain  Hypokalemia    Secondary Diagnoses:                Alkaline phosphatase elevation  Severe protein calorie malnutrition  Bipolar 1 disorder  Rheumatoid arthritis      HPI (Per Dr. Argenis Larson's Admission H&P):     \"Mary Martinez is a 52yo female with a past medical history of chronic pain syndrome, GERD, rheumatoid arthritis requiring amputation of digits of the right hand, malnutrition, small bowel obstruction, substance abuse who presented with an 8 day history of nausea and vomiting with associated epigastric pain.  Her epigastric pain is improved with laying on her left side. She takes omeprazole at home which had not alleviated her abdominal pain.  Denies any exacerbating or modifying factors of her nausea and vomiting.  She has not been able to tolerate eating food in 8 days.  She has been able to tolerate sips of water.  She has noted recently her stool has become dark in color.  Endorses diarrhea and which she states is chronic. Denies any fever, chills, dysuria, shortness of breath.   She was admitted 24 for similar issues and seen in the ED on 2/10/24. Previously she required G-tube placement due to severe malnutrition in the setting of intractable nausea and vomiting.  She had the G-tube removed after it became infected.   Surgical history significant for cholecystectomy, appendectomy,  x 4.\"      Hospital Summary (Brief Narrative):         Mary Martinez was admitted to [facility] on 2024 for management of intractable nausea vomiting with associated epigastric pain.  On presentation, CBC was notable for elevated white blood cell count at 12.8, CMP showed " potassium 3.3, bicarb 14 and anion gap of 18.  Alk phos also noted to be elevated at 218.  Urinalysis notable for protein otherwise within normal limits.  Unfortunately, the patient has been admitted numerous times for similar presentation with etiology remaining undiscovered.  Of note, the patient's history is notable for chronically elevated alk phos which is concerning for PBC versus PSC.  Patient states that she is established with GI and notes that she has had extensive GI workup.  Multiple attempts were made on several days to establish contact with GI consultants however, medical team was unsuccessful.  Patient states that she has a follow-up appointment with GI in April however, she was advised to follow-up sooner if possible.  GI history includes GERD, history of perforated gastric ulcer, s/p cholecystectomy, appendectomy and failed G-tube originally placed for purposes of feeding in the setting of intractable nausea/vomiting however, failed secondary to infection (Pseudomonas).  Patient responded well to antiemetics and received IV hydration.  She eventually was able to tolerate p.o. intake and was deemed medically cleared for discharge.    Consultants:      None    Procedures:        None    Labs:  Results for orders placed or performed during the hospital encounter of 02/12/24   CBC WITH DIFFERENTIAL   Result Value Ref Range    WBC 12.8 (H) 4.8 - 10.8 K/uL    RBC 4.48 4.20 - 5.40 M/uL    Hemoglobin 13.8 12.0 - 16.0 g/dL    Hematocrit 42.1 37.0 - 47.0 %    MCV 94.0 81.4 - 97.8 fL    MCH 30.8 27.0 - 33.0 pg    MCHC 32.8 32.2 - 35.5 g/dL    RDW 51.9 (H) 35.9 - 50.0 fL    Platelet Count 556 (H) 164 - 446 K/uL    MPV 9.4 9.0 - 12.9 fL    Neutrophils-Polys 78.70 (H) 44.00 - 72.00 %    Lymphocytes 12.80 (L) 22.00 - 41.00 %    Monocytes 6.40 0.00 - 13.40 %    Eosinophils 0.90 0.00 - 6.90 %    Basophils 0.90 0.00 - 1.80 %    Immature Granulocytes 0.30 0.00 - 0.90 %    Nucleated RBC 0.00 0.00 - 0.20 /100 WBC     Neutrophils (Absolute) 10.04 (H) 1.82 - 7.42 K/uL    Lymphs (Absolute) 1.63 1.00 - 4.80 K/uL    Monos (Absolute) 0.81 0.00 - 0.85 K/uL    Eos (Absolute) 0.12 0.00 - 0.51 K/uL    Baso (Absolute) 0.11 0.00 - 0.12 K/uL    Immature Granulocytes (abs) 0.04 0.00 - 0.11 K/uL    NRBC (Absolute) 0.00 K/uL   COMP METABOLIC PANEL   Result Value Ref Range    Sodium 138 135 - 145 mmol/L    Potassium 3.3 (L) 3.6 - 5.5 mmol/L    Chloride 106 96 - 112 mmol/L    Co2 14 (L) 20 - 33 mmol/L    Anion Gap 18.0 (H) 7.0 - 16.0    Glucose 102 (H) 65 - 99 mg/dL    Bun 14 8 - 22 mg/dL    Creatinine 0.92 0.50 - 1.40 mg/dL    Calcium 8.5 8.5 - 10.5 mg/dL    Correct Calcium 8.6 8.5 - 10.5 mg/dL    AST(SGOT) 7 (L) 12 - 45 U/L    ALT(SGPT) 10 2 - 50 U/L    Alkaline Phosphatase 218 (H) 30 - 99 U/L    Total Bilirubin 0.3 0.1 - 1.5 mg/dL    Albumin 3.9 3.2 - 4.9 g/dL    Total Protein 7.8 6.0 - 8.2 g/dL    Globulin 3.9 (H) 1.9 - 3.5 g/dL    A-G Ratio 1.0 g/dL   LIPASE   Result Value Ref Range    Lipase 29 11 - 82 U/L   URINALYSIS    Specimen: Urine   Result Value Ref Range    Color Yellow     Character Clear     Specific Gravity 1.024 <1.035    Ph 7.0 5.0 - 8.0    Glucose Negative Negative mg/dL    Ketones Negative Negative mg/dL    Protein 30 (A) Negative mg/dL    Bilirubin Negative Negative    Urobilinogen, Urine 0.2 Negative    Nitrite Negative Negative    Leukocyte Esterase Negative Negative    Occult Blood Negative Negative    Micro Urine Req Microscopic    ESTIMATED GFR   Result Value Ref Range    GFR (CKD-EPI) 75 >60 mL/min/1.73 m 2   URINE MICROSCOPIC (W/UA)   Result Value Ref Range    WBC 0-2 /hpf    RBC 2-5 (A) /hpf    Bacteria Negative None /hpf    Epithelial Cells Few /hpf    Hyaline Cast 0-2 /lpf   Magnesium   Result Value Ref Range    Magnesium 2.2 1.5 - 2.5 mg/dL   Basic Metabolic Panel   Result Value Ref Range    Sodium 134 (L) 135 - 145 mmol/L    Potassium 4.3 3.6 - 5.5 mmol/L    Chloride 108 96 - 112 mmol/L    Co2 16 (L) 20 - 33  mmol/L    Glucose 117 (H) 65 - 99 mg/dL    Bun 16 8 - 22 mg/dL    Creatinine 0.82 0.50 - 1.40 mg/dL    Calcium 8.1 (L) 8.5 - 10.5 mg/dL    Anion Gap 10.0 7.0 - 16.0   ESTIMATED GFR   Result Value Ref Range    GFR (CKD-EPI) 86 >60 mL/min/1.73 m 2   PHOSPHORUS   Result Value Ref Range    Phosphorus 2.1 (L) 2.5 - 4.5 mg/dL   GAMMA GT (GGT)   Result Value Ref Range    Gamma Gt 82 (H) 7 - 34 U/L   Comp Metabolic Panel (CMP)   Result Value Ref Range    Sodium 139 135 - 145 mmol/L    Potassium 4.1 3.6 - 5.5 mmol/L    Chloride 114 (H) 96 - 112 mmol/L    Co2 17 (L) 20 - 33 mmol/L    Anion Gap 8.0 7.0 - 16.0    Glucose 97 65 - 99 mg/dL    Bun 12 8 - 22 mg/dL    Creatinine 0.67 0.50 - 1.40 mg/dL    Calcium 8.0 (L) 8.5 - 10.5 mg/dL    Correct Calcium 8.8 8.5 - 10.5 mg/dL    AST(SGOT) 9 (L) 12 - 45 U/L    ALT(SGPT) 5 2 - 50 U/L    Alkaline Phosphatase 160 (H) 30 - 99 U/L    Total Bilirubin 0.2 0.1 - 1.5 mg/dL    Albumin 3.0 (L) 3.2 - 4.9 g/dL    Total Protein 5.8 (L) 6.0 - 8.2 g/dL    Globulin 2.8 1.9 - 3.5 g/dL    A-G Ratio 1.1 g/dL   CBC WITH DIFFERENTIAL   Result Value Ref Range    WBC 7.8 4.8 - 10.8 K/uL    RBC 3.03 (L) 4.20 - 5.40 M/uL    Hemoglobin 9.6 (L) 12.0 - 16.0 g/dL    Hematocrit 29.1 (L) 37.0 - 47.0 %    MCV 96.0 81.4 - 97.8 fL    MCH 31.7 27.0 - 33.0 pg    MCHC 33.0 32.2 - 35.5 g/dL    RDW 53.0 (H) 35.9 - 50.0 fL    Platelet Count 337 164 - 446 K/uL    MPV 9.5 9.0 - 12.9 fL    Neutrophils-Polys 59.80 44.00 - 72.00 %    Lymphocytes 28.30 22.00 - 41.00 %    Monocytes 9.20 0.00 - 13.40 %    Eosinophils 1.80 0.00 - 6.90 %    Basophils 0.50 0.00 - 1.80 %    Immature Granulocytes 0.40 0.00 - 0.90 %    Nucleated RBC 0.00 0.00 - 0.20 /100 WBC    Neutrophils (Absolute) 4.67 1.82 - 7.42 K/uL    Lymphs (Absolute) 2.21 1.00 - 4.80 K/uL    Monos (Absolute) 0.72 0.00 - 0.85 K/uL    Eos (Absolute) 0.14 0.00 - 0.51 K/uL    Baso (Absolute) 0.04 0.00 - 0.12 K/uL    Immature Granulocytes (abs) 0.03 0.00 - 0.11 K/uL    NRBC  (Absolute) 0.00 K/uL   ESTIMATED GFR   Result Value Ref Range    GFR (CKD-EPI) 105 >60 mL/min/1.73 m 2   CBC WITHOUT DIFFERENTIAL   Result Value Ref Range    WBC 6.9 4.8 - 10.8 K/uL    RBC 3.24 (L) 4.20 - 5.40 M/uL    Hemoglobin 10.3 (L) 12.0 - 16.0 g/dL    Hematocrit 31.4 (L) 37.0 - 47.0 %    MCV 96.9 81.4 - 97.8 fL    MCH 31.8 27.0 - 33.0 pg    MCHC 32.8 32.2 - 35.5 g/dL    RDW 53.5 (H) 35.9 - 50.0 fL    Platelet Count 297 164 - 446 K/uL    MPV 9.8 9.0 - 12.9 fL   Basic Metabolic Panel   Result Value Ref Range    Sodium 137 135 - 145 mmol/L    Potassium 3.7 3.6 - 5.5 mmol/L    Chloride 108 96 - 112 mmol/L    Co2 18 (L) 20 - 33 mmol/L    Glucose 95 65 - 99 mg/dL    Bun 6 (L) 8 - 22 mg/dL    Creatinine 0.65 0.50 - 1.40 mg/dL    Calcium 7.8 (L) 8.5 - 10.5 mg/dL    Anion Gap 11.0 7.0 - 16.0   MAGNESIUM   Result Value Ref Range    Magnesium 2.0 1.5 - 2.5 mg/dL   PHOSPHORUS   Result Value Ref Range    Phosphorus 2.8 2.5 - 4.5 mg/dL   ESTIMATED GFR   Result Value Ref Range    GFR (CKD-EPI) 106 >60 mL/min/1.73 m 2   EKG (NOW)   Result Value Ref Range    Report       [facility] Emergency Dept.    Test Date:  2024  Pt Name:    STEFFANIE ELLISON                Department: ER  MRN:        [id number]                      Room:        38  Gender:     Female                       Technician: [id number]  :        1970                   Requested By:ER TRIAGE PROTOCOL  Order #:    [id number]                    Reading MD: JONATHAN WILCOX MD    Measurements  Intervals                                Axis  Rate:       92                           P:          83  CO:         170                          QRS:        86  QRSD:       106                          T:          59  QT:         372  QTc:        461    Interpretive Statements  Sinus rhythm  Right atrial enlargement  Low voltage, extremity leads  Compared to ECG 2024 14:35:23  No significant changes  Electronically Signed On 2024 10:53:40  PST by JONATHAN WILCOX MD     EKG   Result Value Ref Range    Report       [facility] Emergency Dept.    Test Date:  2023  Pt Name:    STEFFANIE ELLISON                Department: ER  MRN:        [id number]                      Room:       S530  Gender:     Female                       Technician: [id number]  :        1970                   Requested By:JONATHAN WILCOX  Order #:    [id number]                    Reading MD:    Measurements  Intervals                                Axis  Rate:       105                          P:          79  VA:         156                          QRS:        85  QRSD:       92                           T:          44  QT:         335  QTc:        443    Interpretive Statements  Sinus tachycardia  Right atrial enlargement  Pattern suggests chronic pulmonary disease  Consider anterior infarct  Compared to ECG 2023 16:56:55  Atrial abnormality now present  Myocardial infarct finding now present  Sinus rhythm no longer present         Imaging/ Testing:      DX-ABDOMEN COMPLETE WITH AP OR PA CXR   Final Result      1.  Cholecystectomy.   2.  Nonobstructive 6 mm renal calculus lower pole left kidney concordant with CT findings.   3.  No evidence of acute bowel obstruction or other acute intra-abdominal disease process.   4.  No acute cardiopulmonary disease evident.          Discharge Medications:           Medication List        CHANGE how you take these medications        Instructions   * ondansetron 4 MG Tbdp  What changed: Another medication with the same name was added. Make sure you understand how and when to take each.  Commonly known as: Zofran ODT   Take 1 Tablet by mouth every 6 hours as needed for Nausea/Vomiting.  Dose: 4 mg     * ondansetron 4 MG Tbdp  What changed: You were already taking a medication with the same name, and this prescription was added. Make sure you understand how and when to take each.  Commonly known as: Zofran ODT   Take 1  Tablet by mouth every 6 hours as needed for Nausea/Vomiting.  Dose: 4 mg     PARoxetine 30 MG Tabs  What changed: See the new instructions.  Commonly known as: Paxil   TAKE 2 TABLETS(60 MG) BY MOUTH EVERY EVENING           * This list has 2 medication(s) that are the same as other medications prescribed for you. Read the directions carefully, and ask your doctor or other care provider to review them with you.                CONTINUE taking these medications        Instructions   acetaminophen 325 MG Tabs  Commonly known as: Tylenol   Take 325-650 mg by mouth every 6 hours as needed. Indications: Pain  Dose: 325-650 mg     metoclopramide 10 MG Tabs  Commonly known as: Reglan   Take 1 Tablet by mouth 4 times a day as needed (nausea and vomitng).  Dose: 10 mg     omeprazole 40 MG delayed-release capsule  Commonly known as: PriLOSEC   Take 40 mg by mouth 2 times a day.  Dose: 40 mg     oxyCODONE-acetaminophen  MG Tabs  Commonly known as: Percocet-10   Doctor's comments: Please substitute with an available or covered quantity or equivalent alternative if necessary.  Take 1 Tablet by mouth every four hours as needed for Severe Pain for up to 30 days. Indications: Pain  Dose: 1 Tablet     QUEtiapine 100 MG Tabs  Commonly known as: SEROquel   TAKE 1 TABLET BY MOUTH EVERY EVENING  Dose: 100 mg                Disposition:   Medically cleared for discharge with close outpatient follow-up with GI and primary care    Diet:   Regular, healthy    Activity:   May resume previous activity levels, no restrictions    Instructions:      Follow-up with GI consultants and PCP    The patient was instructed to return to the ER in the event of worsening symptoms. I have counseled the patient on the importance of compliance and the patient has agreed to proceed with all medical recommendations and follow up plan indicated above.   The patient understands that all medications come with benefits and risks. Risks may include permanent  injury or death and these risks can be minimized with close reassessment and monitoring.        Please CC: Stormy Gotti M.D.    Follow up appointment details :        Future Appointments   Date Time Provider Department Center   2/16/2024  1:30 PM [facility]       Follow up with Primary Care Physician within 7 days of discharge for further evaluation and care     Pending Studies:        None    (2) The patient met the 2-midnight criteria for an inpatient stay at the time of discharge.

## 2024-02-14 NOTE — CARE PLAN
Problem: Pain - Standard  Goal: Alleviation of pain or a reduction in pain to the patient’s comfort goal  Outcome: Progressing     Problem: Knowledge Deficit - Standard  Goal: Patient and family/care givers will demonstrate understanding of plan of care, disease process/condition, diagnostic tests and medications  Outcome: Progressing     Problem: Skin Integrity  Goal: Skin integrity is maintained or improved  Outcome: Progressing     Problem: Fall Risk  Goal: Patient will remain free from falls  Outcome: Progressing   The patient is Stable - Low risk of patient condition declining or worsening    Shift Goals  Clinical Goals: pain control  Patient Goals: Rest  Family Goals: NAIN    Progress made toward(s) clinical / shift goals:  Patient report pain <6 after prn medication administered. Minimal complaints of nausea. No safety event shift. Patient eating about 50% of meals today.     Patient is not progressing towards the following goals:

## 2024-02-14 NOTE — PROGRESS NOTES
Discharge instructions reviewed and signed, all questions answered, no PIV, medications sent to outside pharmacy.

## 2024-02-14 NOTE — PROGRESS NOTES
[facility] MEDICINE PROGRESS NOTE        Attending:   Matthew Quinonez MD    Resident:   Stormy Gotti MD    PATIENT:   Mary Martinez; [id number]; 1970    ID:   53 y.o. female admitted for intractable nausea and vomiting.     SUBJECTIVE:   No acute events overnight.  Patient reports she is in significant pain secondary to her rheumatoid arthritis.  Epigastric pain has significantly improved from yesterday and denies any recurrence of emesis.  Patient has had some mild intermittent nausea but resolves fairly quickly.  Patient continues to pass gas but has not had a bowel movement.  She denies any difficulty with voiding.  Patient would like to go home today.    OBJECTIVE:  Vitals:    02/13/24 2130 02/13/24 2259 02/14/24 0403 02/14/24 0717   BP: 93/62 (!) 89/60 95/58 91/53   Pulse:  76 86 75   Resp:   17 16   Temp:   36.4 °C (97.6 °F) 36 °C (96.8 °F)   TempSrc:   Temporal Temporal   SpO2:   92% 95%   Weight:       Height:         No intake or output data in the 24 hours ending 02/13/24 0633    PHYSICAL EXAM:  General: NAD, cachectic, pleasant  HEENT: NC/AT. EOMI. MMM, edentulous, neck supple  Cardiovascular: RRR without murmurs. Normal capillary refill   Respiratory: CTAB  Abdomen:  Abdomen is scaphoid. Soft, mild epigastric tenderness (improved)  EXT:  VÁSQUEZ, no edema  Skin: No erythema/lesions   Neuro: Non-focal    LABS:  Recent Labs     02/12/24  0902 02/13/24  0603 02/14/24  0502   WBC 12.8* 7.8 6.9   RBC 4.48 3.03* 3.24*   HEMOGLOBIN 13.8 9.6* 10.3*   HEMATOCRIT 42.1 29.1* 31.4*   MCV 94.0 96.0 96.9   MCH 30.8 31.7 31.8   RDW 51.9* 53.0* 53.5*   PLATELETCT 556* 337 297   MPV 9.4 9.5 9.8   NEUTSPOLYS 78.70* 59.80  --    LYMPHOCYTES 12.80* 28.30  --    MONOCYTES 6.40 9.20  --    EOSINOPHILS 0.90 1.80  --    BASOPHILS 0.90 0.50  --      Recent Labs     02/12/24  0902 02/12/24 2013 02/13/24  0320 02/14/24  0502   SODIUM 138 134* 139 137   POTASSIUM 3.3* 4.3 4.1 3.7   CHLORIDE 106 108 114* 108   CO2 14* 16*  "17* 18*   BUN 14 16 12 6*   CREATININE 0.92 0.82 0.67 0.65   CALCIUM 8.5 8.1* 8.0* 7.8*   MAGNESIUM 2.2  --   --  2.0   PHOSPHORUS  --   --  2.1* 2.8   ALBUMIN 3.9  --  3.0*  --      Estimated GFR/CRCL = CrCl cannot be calculated (Unknown ideal weight.).  Recent Labs     02/12/24 2013 02/13/24  0320 02/14/24  0502   GLUCOSE 117* 97 95     Recent Labs     02/12/24  0902 02/13/24  0320   ASTSGOT 7* 9*   ALTSGPT 10 5   TBILIRUBIN 0.3 0.2   ALKPHOSPHAT 218* 160*   GLOBULIN 3.9* 2.8             No results for input(s): \"INR\", \"APTT\", \"FIBRINOGEN\" in the last 72 hours.    Invalid input(s): \"DIMER\"      IMAGING:  DX-ABDOMEN COMPLETE WITH AP OR PA CXR   Final Result      1.  Cholecystectomy.   2.  Nonobstructive 6 mm renal calculus lower pole left kidney concordant with CT findings.   3.  No evidence of acute bowel obstruction or other acute intra-abdominal disease process.   4.  No acute cardiopulmonary disease evident.          MEDS:  Current Facility-Administered Medications   Medication Last Admin    HYDROmorphone (Dilaudid) injection 0.5 mg 0.5 mg at 02/14/24 0839    omeprazole (PriLOSEC) capsule 20 mg 20 mg at 02/14/24 0536    ondansetron (Zofran) syringe/vial injection 4 mg 4 mg at 02/13/24 1649    ondansetron (Zofran ODT) dispertab 4 mg 4 mg at 02/12/24 1712    promethazine (Phenergan) tablet 12.5-25 mg      promethazine (Phenergan) suppository 12.5-25 mg      prochlorperazine (Compazine) injection 5-10 mg      labetalol (Normodyne/Trandate) injection 10 mg      acetaminophen (Tylenol) tablet 1,000 mg      senna-docusate (Pericolace Or Senokot S) 8.6-50 MG per tablet 2 Tablet      And    polyethylene glycol/lytes (Miralax) Packet 1 Packet      oxyCODONE-acetaminophen (Percocet-10)  MG per tablet 1 Tablet 1 Tablet at 02/14/24 0740    PARoxetine (Paxil) tablet 60 mg 60 mg at 02/13/24 2124    QUEtiapine (SEROquel) tablet 100 mg 100 mg at 02/13/24 2124       ASSESSMENT/PLAN:    * Intractable nausea and vomiting- " (present on admission)  Assessment & Plan  Resolved, chronic, recurring, etiology unknown, high suspicion for PBC versus PSC in setting of elevated GGT and dilated bile ducts.    Plan:   -zofran 4mg q4 hrs prn  -compazine 5-10mg q4 hrs if nausea unresolved with zofran  -phenergan 12.5-25mg q 4 hrs prn if unresolved with compazine  -advancing pts diet as tolerated  -will attempt to obtain outpatient GI records    Thrombocytosis- (present on admission)  Assessment & Plan  Resolved, asymptomatic, likely secondary to hemoconcentration in setting of dehydration due to recurring vomiting.    Plan  -No additional interventions or workup indicated at this time    Bipolar 1 disorder (HCC)  Assessment & Plan  Chronic, stable and well controlled on Seroquel 60mg and Paxil 100mg    Plan  -Cont home med regimen     Hypokalemia- (present on admission)  Assessment & Plan  Mild, asymptomatic, likely secondary to chronic poor p.o. intake, and nausea/vomiting.    Plan  -Continue to monitor, replace as needed  -Encourage p.o. intake as tolerated    Severe protein-calorie malnutrition (HCC)- (present on admission)  Assessment & Plan  Chronic, severe, BMI 15.59 kg/m², secondary to poor p.o. intake in setting of recurring nausea vomiting of unknown etiology.    Plan  -Diet per nutrition recommendations  -will monitor phosphate and electrolytes due to high risk of refeeding syndrome     Epigastric abdominal pain- (present on admission)  Assessment & Plan  Chronic, intermittent, subjectively associated with melanotic stools.  She also has GI history notable for gastric perforated ulcer, dilated bile duct and s/p G-tube which was removed secondary to infection.  -Transient symptomatic relief with GI cocktail    Plan  -Continue omeprazole 20mg BID  -Consider H. Pylori work up  -will attempt to obtain outpatient GI records to obtain further details regarding pts previous GI work-up     Arthritis, rheumatoid (HCC)  Assessment & Plan  Chronic,  stable on percocet 10-325mg q4hrs prn    Plan  -cont home medication    Alkaline phosphatase elevation- (present on admission)  Assessment & Plan  Chronically elevated, most recent CMP 2/12, level was 218 up from 207 on 2/10. GGT mildly elevated which indicates likely liver etiology, concerns for PSC versus PBC.    Plan:   -Will continue to reach out to outpatient GI in efforts to obtain records         IV Fluids: none  Antbx: none  DVT ppx: SCDs, holding pharmacologic prophylaxis in the setting of recent melena   Code status: DNR/DNI    Disposition: Anticipate discharge today, with close outpatient follow up with GI and PCP.     Stormy Gotti MD  PGY-2, [facility]

## 2024-02-14 NOTE — DISCHARGE PLANNING
Case Management Discharge Planning    Admission Date: 2/12/2024  GMLOS: 3.6  ALOS: 2    6-Clicks ADL Score: 17  6-Clicks Mobility Score: 18  PT and/or OT Eval ordered: Yes  Post-acute Referrals Ordered: No  Post-acute Choice Obtained: No  Has referral(s) been sent to post-acute provider:  No    Anticipated Discharge Dispo: Discharge Disposition: Discharged to home/self care (01)  Discharge Address: [address] [place] [id number]  Discharge Contact Phone Number: [phone]    DME Needed: No    Action(s) Taken:   Pt discussed during IDT rounds. Per MD, pt is medically clear to discharge home.     PT recommended HH. No HH available due to insurance coverage.     No needs from CM RN upon discharge.      Escalations Completed: None    Medically Clear: Yes    Next Steps: CM RN to continue assisting with discharge planning as needed.       Barriers to Discharge: None    Is the patient up for discharge tomorrow: No  Pt is discharging today.

## 2024-02-16 ENCOUNTER — OFFICE VISIT (OUTPATIENT)
Dept: MEDICAL GROUP | Facility: CLINIC | Age: 54
End: 2024-02-16
Payer: MEDICAID

## 2024-02-16 VITALS
WEIGHT: 87 LBS | OXYGEN SATURATION: 96 % | DIASTOLIC BLOOD PRESSURE: 67 MMHG | SYSTOLIC BLOOD PRESSURE: 90 MMHG | TEMPERATURE: 98.7 F | RESPIRATION RATE: 12 BRPM | HEART RATE: 107 BPM | BODY MASS INDEX: 15.41 KG/M2 | HEIGHT: 63 IN

## 2024-02-16 DIAGNOSIS — M05.79 RHEUMATOID ARTHRITIS INVOLVING MULTIPLE SITES WITH POSITIVE RHEUMATOID FACTOR (HCC): ICD-10-CM

## 2024-02-16 DIAGNOSIS — R10.84 GENERALIZED ABDOMINAL PAIN: ICD-10-CM

## 2024-02-16 DIAGNOSIS — M06.9 RHEUMATOID ARTHRITIS, INVOLVING UNSPECIFIED SITE, UNSPECIFIED WHETHER RHEUMATOID FACTOR PRESENT (HCC): ICD-10-CM

## 2024-02-16 DIAGNOSIS — R11.2 NAUSEA AND VOMITING, UNSPECIFIED VOMITING TYPE: ICD-10-CM

## 2024-02-16 DIAGNOSIS — R10.13 EPIGASTRIC PAIN: ICD-10-CM

## 2024-02-16 DIAGNOSIS — F31.9 BIPOLAR 1 DISORDER (HCC): ICD-10-CM

## 2024-02-16 PROCEDURE — 3078F DIAST BP <80 MM HG: CPT | Mod: GC

## 2024-02-16 PROCEDURE — 3074F SYST BP LT 130 MM HG: CPT | Mod: GC

## 2024-02-16 PROCEDURE — 99214 OFFICE O/P EST MOD 30 MIN: CPT | Mod: GC

## 2024-02-16 RX ORDER — OXYCODONE AND ACETAMINOPHEN 10; 325 MG/1; MG/1
1 TABLET ORAL EVERY 4 HOURS PRN
Qty: 150 TABLET | Refills: 0 | Status: SHIPPED | OUTPATIENT
Start: 2024-03-18 | End: 2024-04-17

## 2024-02-16 RX ORDER — OXYCODONE AND ACETAMINOPHEN 10; 325 MG/1; MG/1
1 TABLET ORAL EVERY 4 HOURS PRN
Qty: 150 TABLET | Refills: 0 | Status: SHIPPED | OUTPATIENT
Start: 2024-04-18 | End: 2024-05-18

## 2024-02-16 RX ORDER — OXYCODONE AND ACETAMINOPHEN 10; 325 MG/1; MG/1
1 TABLET ORAL EVERY 4 HOURS PRN
Qty: 150 TABLET | Refills: 0 | Status: SHIPPED | OUTPATIENT
Start: 2024-02-16 | End: 2024-03-17

## 2024-02-16 RX ORDER — QUETIAPINE FUMARATE 100 MG/1
100 TABLET, FILM COATED ORAL EVERY EVENING
Qty: 90 TABLET | Refills: 0 | Status: SHIPPED | OUTPATIENT
Start: 2024-02-16

## 2024-02-16 RX ORDER — METOCLOPRAMIDE 10 MG/1
10 TABLET ORAL 4 TIMES DAILY PRN
Qty: 30 TABLET | Refills: 0 | Status: SHIPPED | OUTPATIENT
Start: 2024-02-16

## 2024-02-16 RX ORDER — ONDANSETRON 4 MG/1
4 TABLET, ORALLY DISINTEGRATING ORAL EVERY 6 HOURS PRN
Qty: 30 TABLET | Refills: 0 | Status: SHIPPED | OUTPATIENT
Start: 2024-02-16

## 2024-02-16 ASSESSMENT — FIBROSIS 4 INDEX: FIB4 SCORE: 0.69

## 2024-02-16 NOTE — PROGRESS NOTES
This note is formatted in an APSO format, for additional subjective and objective evaluation please scroll to the bottom of the note.    CC:  Chief Complaint   Patient presents with    Medication Refill     Pt here for med refill. Recent hospitalization for abd pain and n/v with weight loss.    Assessment/Plan:  Problem List Items Addressed This Visit       Arthritis, rheumatoid (HCC)     Severe RA, on percocet 10. PDMP reviewed, no concern for abuse or diversion. Has narcan at home and pt and family know how to use it. Controlled sub contract signed within the last year.  - Refilled percocet         Relevant Medications    oxyCODONE-acetaminophen (PERCOCET-10)  MG Tab (Start on 4/18/2024)    oxyCODONE-acetaminophen (PERCOCET)  MG Tab (Start on 3/18/2024)    oxyCODONE-acetaminophen (PERCOCET-10)  MG Tab    Rheumatoid arthritis (HCC)    Relevant Medications    oxyCODONE-acetaminophen (PERCOCET-10)  MG Tab (Start on 4/18/2024)    oxyCODONE-acetaminophen (PERCOCET)  MG Tab (Start on 3/18/2024)    oxyCODONE-acetaminophen (PERCOCET-10)  MG Tab    Bipolar 1 disorder (HCC)    Relevant Medications    QUEtiapine (SEROQUEL) 100 MG Tab    Nausea & vomiting    Relevant Medications    metoclopramide (REGLAN) 10 MG Tab    ondansetron (ZOFRAN ODT) 4 MG TABLET DISPERSIBLE    Other Relevant Orders    H.PYLORI STOOL ANTIGEN    Referral to Gastroenterology    Generalized abdominal pain     Chronic abdominal pain worsening in the last month or two. +N/V after eating. Able to tolerate water. Abd pain not associated with eating. Unlikely arterial insuff based on history. She went to the hospital recently but was unable to see GI (see discharge summary) there so plans to follow up with GI outpatient - however the earliest appointment she can get is in April. She is continuing to lose weight while this is happening.  - Placed urgent gi referral  - ER precautions  - EKG reviewed, Qtc is low enough that  risk/benefit of reglan/zofran supports limited use. Discussed risks of reglan and zofran on heart function, pt will try conservative measures first and limit use of antiemetics.  - Continue prilosec 40 daily until she can see GI.  - ordered H. Pylori stool testing          Other Visit Diagnoses       Epigastric pain        Relevant Medications    metoclopramide (REGLAN) 10 MG Tab    Other Relevant Orders    H.PYLORI STOOL ANTIGEN    Referral to Gastroenterology           Orders Placed This Encounter    H.PYLORI STOOL ANTIGEN    Referral to Gastroenterology    QUEtiapine (SEROQUEL) 100 MG Tab    oxyCODONE-acetaminophen (PERCOCET-10)  MG Tab    oxyCODONE-acetaminophen (PERCOCET)  MG Tab    oxyCODONE-acetaminophen (PERCOCET-10)  MG Tab    metoclopramide (REGLAN) 10 MG Tab    ondansetron (ZOFRAN ODT) 4 MG TABLET DISPERSIBLE       No follow-ups on file.      LABS: Results reviewed and discussed with the patient, questions answered.    HISTORY OF PRESENT ILLNESS: Patient is a 53 y.o. female established patient who presents today with:    Problem   Generalized Abdominal Pain   Arthritis, Rheumatoid ([facility])       1. Rheumatoid arthritis involving multiple sites with positive rheumatoid factor ([facility])  oxyCODONE-acetaminophen (PERCOCET-10)  MG Tab    oxyCODONE-acetaminophen (PERCOCET)  MG Tab    oxyCODONE-acetaminophen (PERCOCET-10)  MG Tab      2. Bipolar 1 disorder ([facility])  QUEtiapine (SEROQUEL) 100 MG Tab      3. Nausea and vomiting, unspecified vomiting type  metoclopramide (REGLAN) 10 MG Tab    H.PYLORI STOOL ANTIGEN    Referral to Gastroenterology      4. Epigastric pain  metoclopramide (REGLAN) 10 MG Tab    H.PYLORI STOOL ANTIGEN    Referral to Gastroenterology      5. Generalized abdominal pain        6. Rheumatoid arthritis, involving unspecified site, unspecified whether rheumatoid factor present ([facility])            Patient Active Problem List    Diagnosis Date Noted    Nausea & vomiting  02/07/2024    Generalized abdominal pain 02/07/2024    Anxiety and depression 02/07/2024    Cellulitis of abdominal wall 09/20/2023    GERD (gastroesophageal reflux disease) 08/24/2023    Common bile duct dilation 08/14/2023    Hypocalcemia 05/25/2023    Failure to thrive in adult 05/25/2023    Thrush 05/25/2023    UTI (urinary tract infection) 05/25/2023    Normocytic anemia 02/19/2023    Pulmonary nodule, right 02/19/2023    Neuropathy 02/18/2023    Starvation ketoacidosis 01/19/2023    Diarrhea 01/19/2023    Bipolar 1 disorder (HCC) 01/19/2023    Severe protein-calorie malnutrition (HCC) 08/31/2022    Vitamin B12 deficiency neuropathy ([facility]) 08/29/2022    Idiopathic chronic pancreatitis (HCC) 08/29/2022    Rheumatoid arthritis of hand ([facility]) 08/29/2022    Chronic pain syndrome 08/29/2022    SMAS (superior mesenteric artery syndrome) c/b feeding issues ([facility]) 08/27/2022    Severe protein-calorie malnutrition (HCC) 08/27/2022    Tobacco abuse 08/27/2022    Renal calculus 02/21/2022    DNR (do not resuscitate) 02/21/2022    Epigastric abdominal pain 02/21/2022    Dysthymia 05/07/2021    Enlarged lymph nodes 05/07/2021    Hiatal hernia 05/07/2021    Unintentional weight loss 05/07/2021    History of rheumatoid arthritis 02/12/2021    Immunosuppression due to chronic steroid use ([facility]) 02/12/2021    Other hydronephrosis 02/12/2021    Hypophosphatemia 10/08/2019    Chronic pain 10/08/2019    Hoarseness 10/06/2019    History of perforation of posterior duodenum pancreatitis abscess 10/04/2019    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 90 Tablet 0    [START ON 2024] oxyCODONE-acetaminophen (PERCOCET-10)  MG Tab Take 1 Tablet by mouth every four hours as needed for Severe Pain for up to 30 days. Indications: Pain 150 Tablet 0    [START ON 3/18/2024] oxyCODONE-acetaminophen (PERCOCET)  MG Tab Take 1 Tablet by mouth every four hours as needed for Severe Pain for up to 30 days. Indications: Pain 150 Tablet 0    oxyCODONE-acetaminophen (PERCOCET-10)  MG Tab Take 1 Tablet by mouth every four hours as needed for Severe Pain for up to 30 days. Indications: Pain 150 Tablet 0    metoclopramide (REGLAN) 10 MG Tab Take 1 Tablet by mouth 4 times a day as needed (nausea and vomitng). 30 Tablet 0    ondansetron (ZOFRAN ODT) 4 MG TABLET DISPERSIBLE Take 1 Tablet by mouth every 6 hours as needed for Nausea/Vomiting. 30 Tablet 0    omeprazole (PRILOSEC) 40 MG delayed-release capsule Take 40 mg by mouth 2 times a day.      PARoxetine (PAXIL) 30 MG Tab TAKE 2 TABLETS(60 MG) BY MOUTH EVERY EVENING (Patient taking differently: Take 60 mg by mouth at bedtime.) 180 Tablet 0    acetaminophen (TYLENOL) 325 MG Tab Take 325-650 mg by mouth every 6 hours as needed. Indications: Pain       No current facility-administered medications for this visit.       Social History     Tobacco Use    Smoking status: Former     Current packs/day: 0.00     Average packs/day: 0.5 packs/day for 30.0 years (15.0 ttl pk-yrs)     Types: Cigarettes     Quit date: 2023     Years since quittin.2     Passive exposure: Never    Smokeless tobacco: Never   Vaping Use    Vaping Use: Never used   Substance Use Topics    Alcohol use: Never    Drug use: Never     Social History     Social History Narrative    ** Merged History Encounter **            Family History   Problem Relation Age of Onset    Cancer Mother     Heart Disease Mother     Hypertension Mother     Heart Disease Father     Hypertension Father        Exam:    BP 90/67 (BP Location: Right arm,  "Patient Position: Sitting, BP Cuff Size: Small adult)   Pulse (!) 107   Temp 37.1 °C (98.7 °F) (Temporal)   Resp 12   Ht 1.6 m (5' 3\")   Wt 39.5 kg (87 lb)   LMP 09/21/2011   SpO2 96%   BMI 15.41 kg/m²  Body mass index is 15.41 kg/m².    Gen: Well appearing. No apparent distress. Well developed. Sitting comfortably in wheelchair  HEENT: NCAT, MMM  Neck: Supple, FROM  Chest: No deformities, Equal chest expansion  Lungs: Normal effort, CTA bilaterally.  CV: Regular rate and rhythm.  Abd: Non-distended.  Ext: No cyanosis. No edema.  Skin: Warm/dry. No visible rashes.  Neuro: Non-focal. A&Ox4.  Psych: Normal behavior, normal affect      Petar Abbott MD  [facility] Resident    "

## 2024-02-18 NOTE — ASSESSMENT & PLAN NOTE
Chronic abdominal pain worsening in the last month or two. +N/V after eating. Able to tolerate water. Abd pain not associated with eating. Unlikely arterial insuff based on history. She went to the hospital recently but was unable to see GI (see discharge summary) there so plans to follow up with GI outpatient - however the earliest appointment she can get is in April. She is continuing to lose weight while this is happening.  - Placed urgent gi referral  - ER precautions  - EKG reviewed, Qtc is low enough that risk/benefit of reglan/zofran supports limited use. Discussed risks of reglan and zofran on heart function, pt will try conservative measures first and limit use of antiemetics.  - Continue prilosec 40 daily until she can see GI.  - ordered H. Pylori stool testing

## 2024-02-18 NOTE — ASSESSMENT & PLAN NOTE
Severe RA, on percocet 10. PDMP reviewed, no concern for abuse or diversion. Has narcan at home and pt and family know how to use it. Controlled sub contract signed within the last year.  - Refilled percocet

## 2024-02-27 ENCOUNTER — HOSPITAL ENCOUNTER (OUTPATIENT)
Facility: MEDICAL CENTER | Age: 54
End: 2024-02-29
Attending: EMERGENCY MEDICINE | Admitting: FAMILY MEDICINE
Payer: MEDICAID

## 2024-02-27 ENCOUNTER — APPOINTMENT (OUTPATIENT)
Dept: RADIOLOGY | Facility: MEDICAL CENTER | Age: 54
End: 2024-02-27
Payer: MEDICAID

## 2024-02-27 ENCOUNTER — APPOINTMENT (OUTPATIENT)
Dept: RADIOLOGY | Facility: MEDICAL CENTER | Age: 54
End: 2024-02-27
Attending: EMERGENCY MEDICINE
Payer: MEDICAID

## 2024-02-27 DIAGNOSIS — R10.13 EPIGASTRIC PAIN: ICD-10-CM

## 2024-02-27 DIAGNOSIS — N25.89 RTA (RENAL TUBULAR ACIDOSIS): ICD-10-CM

## 2024-02-27 DIAGNOSIS — E87.20 METABOLIC ACIDOSIS WITH NORMAL ANION GAP AND BICARBONATE LOSSES: ICD-10-CM

## 2024-02-27 DIAGNOSIS — R19.5 HEME POSITIVE STOOL: ICD-10-CM

## 2024-02-27 LAB
ABO GROUP BLD: NORMAL
ALBUMIN SERPL BCP-MCNC: 3.2 G/DL (ref 3.2–4.9)
ALBUMIN/GLOB SERPL: 0.7 G/DL
ALP SERPL-CCNC: 131 U/L (ref 30–99)
ALT SERPL-CCNC: 9 U/L (ref 2–50)
ANION GAP SERPL CALC-SCNC: 14 MMOL/L (ref 7–16)
APTT PPP: 31.5 SEC (ref 24.7–36)
AST SERPL-CCNC: 12 U/L (ref 12–45)
BASOPHILS # BLD AUTO: 0.7 % (ref 0–1.8)
BASOPHILS # BLD: 0.05 K/UL (ref 0–0.12)
BILIRUB SERPL-MCNC: <0.2 MG/DL (ref 0.1–1.5)
BLD GP AB SCN SERPL QL: NORMAL
BUN SERPL-MCNC: 17 MG/DL (ref 8–22)
CALCIUM ALBUM COR SERPL-MCNC: 9.3 MG/DL (ref 8.5–10.5)
CALCIUM SERPL-MCNC: 8.7 MG/DL (ref 8.5–10.5)
CHLORIDE SERPL-SCNC: 112 MMOL/L (ref 96–112)
CO2 SERPL-SCNC: 9 MMOL/L (ref 20–33)
CREAT SERPL-MCNC: 0.76 MG/DL (ref 0.5–1.4)
EOSINOPHIL # BLD AUTO: 0.13 K/UL (ref 0–0.51)
EOSINOPHIL NFR BLD: 1.8 % (ref 0–6.9)
ERYTHROCYTE [DISTWIDTH] IN BLOOD BY AUTOMATED COUNT: 55.2 FL (ref 35.9–50)
GFR SERPLBLD CREATININE-BSD FMLA CKD-EPI: 94 ML/MIN/1.73 M 2
GLOBULIN SER CALC-MCNC: 4.3 G/DL (ref 1.9–3.5)
GLUCOSE SERPL-MCNC: 87 MG/DL (ref 65–99)
HCT VFR BLD AUTO: 32.7 % (ref 37–47)
HGB BLD-MCNC: 10.7 G/DL (ref 12–16)
IMM GRANULOCYTES # BLD AUTO: 0.06 K/UL (ref 0–0.11)
IMM GRANULOCYTES NFR BLD AUTO: 0.8 % (ref 0–0.9)
INR PPP: 1.23 (ref 0.87–1.13)
LIPASE SERPL-CCNC: 40 U/L (ref 11–82)
LYMPHOCYTES # BLD AUTO: 1.44 K/UL (ref 1–4.8)
LYMPHOCYTES NFR BLD: 20.3 % (ref 22–41)
MAGNESIUM SERPL-MCNC: 2.2 MG/DL (ref 1.5–2.5)
MCH RBC QN AUTO: 31.8 PG (ref 27–33)
MCHC RBC AUTO-ENTMCNC: 32.7 G/DL (ref 32.2–35.5)
MCV RBC AUTO: 97.3 FL (ref 81.4–97.8)
MONOCYTES # BLD AUTO: 0.68 K/UL (ref 0–0.85)
MONOCYTES NFR BLD AUTO: 9.6 % (ref 0–13.4)
NEUTROPHILS # BLD AUTO: 4.72 K/UL (ref 1.82–7.42)
NEUTROPHILS NFR BLD: 66.8 % (ref 44–72)
NRBC # BLD AUTO: 0 K/UL
NRBC BLD-RTO: 0 /100 WBC (ref 0–0.2)
PLATELET # BLD AUTO: 459 K/UL (ref 164–446)
PMV BLD AUTO: 9.2 FL (ref 9–12.9)
POTASSIUM SERPL-SCNC: 3.8 MMOL/L (ref 3.6–5.5)
PROT SERPL-MCNC: 7.5 G/DL (ref 6–8.2)
PROTHROMBIN TIME: 15.6 SEC (ref 12–14.6)
RBC # BLD AUTO: 3.36 M/UL (ref 4.2–5.4)
RH BLD: NORMAL
SODIUM SERPL-SCNC: 135 MMOL/L (ref 135–145)
WBC # BLD AUTO: 7.1 K/UL (ref 4.8–10.8)

## 2024-02-27 PROCEDURE — 86900 BLOOD TYPING SEROLOGIC ABO: CPT

## 2024-02-27 PROCEDURE — 700102 HCHG RX REV CODE 250 W/ 637 OVERRIDE(OP)

## 2024-02-27 PROCEDURE — 99285 EMERGENCY DEPT VISIT HI MDM: CPT

## 2024-02-27 PROCEDURE — 74018 RADEX ABDOMEN 1 VIEW: CPT

## 2024-02-27 PROCEDURE — 96374 THER/PROPH/DIAG INJ IV PUSH: CPT

## 2024-02-27 PROCEDURE — 700105 HCHG RX REV CODE 258: Mod: UD | Performed by: EMERGENCY MEDICINE

## 2024-02-27 PROCEDURE — 85610 PROTHROMBIN TIME: CPT

## 2024-02-27 PROCEDURE — 36415 COLL VENOUS BLD VENIPUNCTURE: CPT

## 2024-02-27 PROCEDURE — 80053 COMPREHEN METABOLIC PANEL: CPT

## 2024-02-27 PROCEDURE — 86850 RBC ANTIBODY SCREEN: CPT

## 2024-02-27 PROCEDURE — 770006 HCHG ROOM/CARE - MED/SURG/GYN SEMI*

## 2024-02-27 PROCEDURE — 86901 BLOOD TYPING SEROLOGIC RH(D): CPT

## 2024-02-27 PROCEDURE — 700105 HCHG RX REV CODE 258: Mod: UD

## 2024-02-27 PROCEDURE — 700111 HCHG RX REV CODE 636 W/ 250 OVERRIDE (IP): Mod: JZ

## 2024-02-27 PROCEDURE — 83735 ASSAY OF MAGNESIUM: CPT

## 2024-02-27 PROCEDURE — 96376 TX/PRO/DX INJ SAME DRUG ADON: CPT

## 2024-02-27 PROCEDURE — 83690 ASSAY OF LIPASE: CPT

## 2024-02-27 PROCEDURE — 96375 TX/PRO/DX INJ NEW DRUG ADDON: CPT

## 2024-02-27 PROCEDURE — 99223 1ST HOSP IP/OBS HIGH 75: CPT | Mod: GC | Performed by: FAMILY MEDICINE

## 2024-02-27 PROCEDURE — 85025 COMPLETE CBC W/AUTO DIFF WBC: CPT

## 2024-02-27 PROCEDURE — 85730 THROMBOPLASTIN TIME PARTIAL: CPT

## 2024-02-27 PROCEDURE — 71045 X-RAY EXAM CHEST 1 VIEW: CPT

## 2024-02-27 PROCEDURE — A9270 NON-COVERED ITEM OR SERVICE: HCPCS

## 2024-02-27 PROCEDURE — 700111 HCHG RX REV CODE 636 W/ 250 OVERRIDE (IP): Mod: JZ,UD | Performed by: EMERGENCY MEDICINE

## 2024-02-27 RX ORDER — PAROXETINE HYDROCHLORIDE 20 MG/1
60 TABLET, FILM COATED ORAL
Status: DISCONTINUED | OUTPATIENT
Start: 2024-02-27 | End: 2024-02-29 | Stop reason: HOSPADM

## 2024-02-27 RX ORDER — SODIUM CHLORIDE 9 MG/ML
1000 INJECTION, SOLUTION INTRAVENOUS ONCE
Status: COMPLETED | OUTPATIENT
Start: 2024-02-27 | End: 2024-02-27

## 2024-02-27 RX ORDER — METOCLOPRAMIDE 10 MG/1
10 TABLET ORAL 4 TIMES DAILY PRN
Status: DISCONTINUED | OUTPATIENT
Start: 2024-02-27 | End: 2024-02-29 | Stop reason: HOSPADM

## 2024-02-27 RX ORDER — MORPHINE SULFATE 4 MG/ML
4 INJECTION INTRAVENOUS ONCE
Status: COMPLETED | OUTPATIENT
Start: 2024-02-27 | End: 2024-02-27

## 2024-02-27 RX ORDER — OXYCODONE AND ACETAMINOPHEN 10; 325 MG/1; MG/1
1 TABLET ORAL EVERY 4 HOURS PRN
Status: DISCONTINUED | OUTPATIENT
Start: 2024-02-27 | End: 2024-02-29 | Stop reason: HOSPADM

## 2024-02-27 RX ORDER — OMEPRAZOLE 20 MG/1
40 CAPSULE, DELAYED RELEASE ORAL 2 TIMES DAILY
Status: DISCONTINUED | OUTPATIENT
Start: 2024-02-27 | End: 2024-02-29 | Stop reason: HOSPADM

## 2024-02-27 RX ORDER — ONDANSETRON 2 MG/ML
4 INJECTION INTRAMUSCULAR; INTRAVENOUS EVERY 4 HOURS PRN
Status: DISCONTINUED | OUTPATIENT
Start: 2024-02-27 | End: 2024-02-29 | Stop reason: HOSPADM

## 2024-02-27 RX ORDER — POLYETHYLENE GLYCOL 3350 17 G/17G
1 POWDER, FOR SOLUTION ORAL ONCE
Status: COMPLETED | OUTPATIENT
Start: 2024-02-27 | End: 2024-02-27

## 2024-02-27 RX ORDER — QUETIAPINE FUMARATE 100 MG/1
100 TABLET, FILM COATED ORAL EVERY EVENING
Status: DISCONTINUED | OUTPATIENT
Start: 2024-02-27 | End: 2024-02-29 | Stop reason: HOSPADM

## 2024-02-27 RX ORDER — OXYCODONE AND ACETAMINOPHEN 10; 325 MG/1; MG/1
1 TABLET ORAL EVERY 4 HOURS PRN
Status: DISCONTINUED | OUTPATIENT
Start: 2024-03-18 | End: 2024-02-27

## 2024-02-27 RX ORDER — ACETAMINOPHEN 500 MG
500 TABLET ORAL EVERY 6 HOURS PRN
Status: DISCONTINUED | OUTPATIENT
Start: 2024-02-27 | End: 2024-02-29 | Stop reason: HOSPADM

## 2024-02-27 RX ORDER — ONDANSETRON 4 MG/1
4 TABLET, ORALLY DISINTEGRATING ORAL EVERY 4 HOURS PRN
Status: DISCONTINUED | OUTPATIENT
Start: 2024-02-27 | End: 2024-02-29 | Stop reason: HOSPADM

## 2024-02-27 RX ORDER — SODIUM CHLORIDE, SODIUM LACTATE, POTASSIUM CHLORIDE, CALCIUM CHLORIDE 600; 310; 30; 20 MG/100ML; MG/100ML; MG/100ML; MG/100ML
INJECTION, SOLUTION INTRAVENOUS CONTINUOUS
Status: DISCONTINUED | OUTPATIENT
Start: 2024-02-27 | End: 2024-02-29 | Stop reason: HOSPADM

## 2024-02-27 RX ORDER — ONDANSETRON 2 MG/ML
4 INJECTION INTRAMUSCULAR; INTRAVENOUS ONCE
Status: COMPLETED | OUTPATIENT
Start: 2024-02-27 | End: 2024-02-27

## 2024-02-27 RX ADMIN — POLYETHYLENE GLYCOL 3350 1 PACKET: 17 POWDER, FOR SOLUTION ORAL at 20:51

## 2024-02-27 RX ADMIN — MORPHINE SULFATE 4 MG: 4 INJECTION, SOLUTION INTRAMUSCULAR; INTRAVENOUS at 21:50

## 2024-02-27 RX ADMIN — SODIUM CHLORIDE, POTASSIUM CHLORIDE, SODIUM LACTATE AND CALCIUM CHLORIDE: 600; 310; 30; 20 INJECTION, SOLUTION INTRAVENOUS at 20:50

## 2024-02-27 RX ADMIN — OXYCODONE AND ACETAMINOPHEN 1 TABLET: 10; 325 TABLET ORAL at 23:50

## 2024-02-27 RX ADMIN — QUETIAPINE FUMARATE 100 MG: 100 TABLET ORAL at 20:51

## 2024-02-27 RX ADMIN — OMEPRAZOLE 40 MG: 20 CAPSULE, DELAYED RELEASE ORAL at 20:51

## 2024-02-27 RX ADMIN — SODIUM CHLORIDE 1000 ML: 9 INJECTION, SOLUTION INTRAVENOUS at 18:16

## 2024-02-27 RX ADMIN — PAROXETINE 60 MG: 30 TABLET, FILM COATED ORAL at 20:51

## 2024-02-27 RX ADMIN — MORPHINE SULFATE 4 MG: 4 INJECTION, SOLUTION INTRAMUSCULAR; INTRAVENOUS at 18:16

## 2024-02-27 RX ADMIN — ONDANSETRON 4 MG: 2 INJECTION INTRAMUSCULAR; INTRAVENOUS at 18:16

## 2024-02-27 RX ADMIN — ONDANSETRON 4 MG: 4 TABLET, ORALLY DISINTEGRATING ORAL at 23:50

## 2024-02-27 ASSESSMENT — PAIN DESCRIPTION - PAIN TYPE
TYPE: ACUTE PAIN
TYPE: ACUTE PAIN

## 2024-02-27 ASSESSMENT — FIBROSIS 4 INDEX: FIB4 SCORE: 0.69

## 2024-02-28 PROBLEM — E87.20 METABOLIC ACIDOSIS WITH NORMAL ANION GAP AND BICARBONATE LOSSES: Status: ACTIVE | Noted: 2024-02-28

## 2024-02-28 PROBLEM — G89.29 PAIN, CHRONIC: Status: ACTIVE | Noted: 2024-02-28

## 2024-02-28 PROBLEM — G47.00 INSOMNIA: Status: ACTIVE | Noted: 2024-02-28

## 2024-02-28 LAB
ALBUMIN SERPL BCP-MCNC: 3.2 G/DL (ref 3.2–4.9)
ALBUMIN/GLOB SERPL: 0.8 G/DL
ALP SERPL-CCNC: 129 U/L (ref 30–99)
ALT SERPL-CCNC: 10 U/L (ref 2–50)
ANION GAP SERPL CALC-SCNC: 13 MMOL/L (ref 7–16)
AST SERPL-CCNC: 11 U/L (ref 12–45)
BASE EXCESS BLDA CALC-SCNC: -13 MMOL/L (ref -4–3)
BASOPHILS # BLD AUTO: 0.6 % (ref 0–1.8)
BASOPHILS # BLD: 0.04 K/UL (ref 0–0.12)
BILIRUB SERPL-MCNC: 0.3 MG/DL (ref 0.1–1.5)
BODY TEMPERATURE: ABNORMAL CENTIGRADE
BUN SERPL-MCNC: 13 MG/DL (ref 8–22)
CALCIUM ALBUM COR SERPL-MCNC: 9 MG/DL (ref 8.5–10.5)
CALCIUM SERPL-MCNC: 8.4 MG/DL (ref 8.5–10.5)
CHLORIDE SERPL-SCNC: 112 MMOL/L (ref 96–112)
CHLORIDE UR-SCNC: 101 MMOL/L
CO2 SERPL-SCNC: 10 MMOL/L (ref 20–33)
CREAT SERPL-MCNC: 0.57 MG/DL (ref 0.5–1.4)
CREAT UR-MCNC: 35.17 MG/DL
EOSINOPHIL # BLD AUTO: 0.12 K/UL (ref 0–0.51)
EOSINOPHIL NFR BLD: 1.7 % (ref 0–6.9)
ERYTHROCYTE [DISTWIDTH] IN BLOOD BY AUTOMATED COUNT: 55.1 FL (ref 35.9–50)
GFR SERPLBLD CREATININE-BSD FMLA CKD-EPI: 109 ML/MIN/1.73 M 2
GLOBULIN SER CALC-MCNC: 3.8 G/DL (ref 1.9–3.5)
GLUCOSE SERPL-MCNC: 98 MG/DL (ref 65–99)
HCO3 BLDA-SCNC: 11 MMOL/L (ref 17–25)
HCT VFR BLD AUTO: 30.6 % (ref 37–47)
HGB BLD-MCNC: 9.9 G/DL (ref 12–16)
IMM GRANULOCYTES # BLD AUTO: 0.05 K/UL (ref 0–0.11)
IMM GRANULOCYTES NFR BLD AUTO: 0.7 % (ref 0–0.9)
INHALED O2 FLOW RATE: ABNORMAL L/MIN
LACTATE SERPL-SCNC: 0.5 MMOL/L (ref 0.5–2)
LACTATE SERPL-SCNC: 0.7 MMOL/L (ref 0.5–2)
LACTATE SERPL-SCNC: 0.7 MMOL/L (ref 0.5–2)
LACTATE SERPL-SCNC: 0.8 MMOL/L (ref 0.5–2)
LYMPHOCYTES # BLD AUTO: 1.35 K/UL (ref 1–4.8)
LYMPHOCYTES NFR BLD: 18.7 % (ref 22–41)
MCH RBC QN AUTO: 31.3 PG (ref 27–33)
MCHC RBC AUTO-ENTMCNC: 32.4 G/DL (ref 32.2–35.5)
MCV RBC AUTO: 96.8 FL (ref 81.4–97.8)
MONOCYTES # BLD AUTO: 0.64 K/UL (ref 0–0.85)
MONOCYTES NFR BLD AUTO: 8.9 % (ref 0–13.4)
NEUTROPHILS # BLD AUTO: 5.01 K/UL (ref 1.82–7.42)
NEUTROPHILS NFR BLD: 69.4 % (ref 44–72)
NRBC # BLD AUTO: 0 K/UL
NRBC BLD-RTO: 0 /100 WBC (ref 0–0.2)
PCO2 BLDA: 21.9 MMHG (ref 26–37)
PCO2 TEMP ADJ BLDA: 22.2 MMHG (ref 26–37)
PH BLDA: 7.33 [PH] (ref 7.4–7.5)
PH TEMP ADJ BLDA: 7.33 [PH] (ref 7.4–7.5)
PH UR STRIP.AUTO: 6.5 [PH] (ref 5–8)
PLATELET # BLD AUTO: 449 K/UL (ref 164–446)
PMV BLD AUTO: 9.4 FL (ref 9–12.9)
PO2 BLDA: 100.5 MMHG (ref 64–87)
PO2 TEMP ADJ BLDA: 102.3 MMHG (ref 64–87)
POTASSIUM SERPL-SCNC: 3.4 MMOL/L (ref 3.6–5.5)
POTASSIUM UR-SCNC: 19 MMOL/L
PROT SERPL-MCNC: 7 G/DL (ref 6–8.2)
PROT UR-MCNC: 26 MG/DL (ref 0–15)
RBC # BLD AUTO: 3.16 M/UL (ref 4.2–5.4)
SAO2 % BLDA: 97.2 % (ref 93–99)
SODIUM SERPL-SCNC: 135 MMOL/L (ref 135–145)
SODIUM UR-SCNC: 102 MMOL/L
WBC # BLD AUTO: 7.2 K/UL (ref 4.8–10.8)

## 2024-02-28 PROCEDURE — 700102 HCHG RX REV CODE 250 W/ 637 OVERRIDE(OP): Mod: UD

## 2024-02-28 PROCEDURE — 82436 ASSAY OF URINE CHLORIDE: CPT

## 2024-02-28 PROCEDURE — 80053 COMPREHEN METABOLIC PANEL: CPT

## 2024-02-28 PROCEDURE — 81001 URINALYSIS AUTO W/SCOPE: CPT

## 2024-02-28 PROCEDURE — A9270 NON-COVERED ITEM OR SERVICE: HCPCS | Mod: UD

## 2024-02-28 PROCEDURE — 82803 BLOOD GASES ANY COMBINATION: CPT

## 2024-02-28 PROCEDURE — 83986 ASSAY PH BODY FLUID NOS: CPT

## 2024-02-28 PROCEDURE — 700111 HCHG RX REV CODE 636 W/ 250 OVERRIDE (IP): Mod: JZ

## 2024-02-28 PROCEDURE — 85025 COMPLETE CBC W/AUTO DIFF WBC: CPT

## 2024-02-28 PROCEDURE — 96376 TX/PRO/DX INJ SAME DRUG ADON: CPT

## 2024-02-28 PROCEDURE — 84156 ASSAY OF PROTEIN URINE: CPT

## 2024-02-28 PROCEDURE — 700111 HCHG RX REV CODE 636 W/ 250 OVERRIDE (IP)

## 2024-02-28 PROCEDURE — 83605 ASSAY OF LACTIC ACID: CPT | Mod: 91

## 2024-02-28 PROCEDURE — 84133 ASSAY OF URINE POTASSIUM: CPT

## 2024-02-28 PROCEDURE — 84300 ASSAY OF URINE SODIUM: CPT

## 2024-02-28 PROCEDURE — 770006 HCHG ROOM/CARE - MED/SURG/GYN SEMI*

## 2024-02-28 PROCEDURE — 700105 HCHG RX REV CODE 258

## 2024-02-28 PROCEDURE — 82570 ASSAY OF URINE CREATININE: CPT

## 2024-02-28 RX ORDER — GABAPENTIN 100 MG/1
200 CAPSULE ORAL 3 TIMES DAILY
Status: DISCONTINUED | OUTPATIENT
Start: 2024-02-28 | End: 2024-02-29 | Stop reason: HOSPADM

## 2024-02-28 RX ORDER — AMOXICILLIN 250 MG
1 CAPSULE ORAL 2 TIMES DAILY
Status: DISCONTINUED | OUTPATIENT
Start: 2024-02-28 | End: 2024-02-29 | Stop reason: HOSPADM

## 2024-02-28 RX ORDER — HYDROMORPHONE HYDROCHLORIDE 1 MG/ML
1 INJECTION, SOLUTION INTRAMUSCULAR; INTRAVENOUS; SUBCUTANEOUS ONCE
Status: COMPLETED | OUTPATIENT
Start: 2024-02-28 | End: 2024-02-28

## 2024-02-28 RX ORDER — POTASSIUM CHLORIDE 20 MEQ/1
40 TABLET, EXTENDED RELEASE ORAL ONCE
Status: COMPLETED | OUTPATIENT
Start: 2024-02-28 | End: 2024-02-28

## 2024-02-28 RX ORDER — POLYETHYLENE GLYCOL 3350 17 G/17G
1 POWDER, FOR SOLUTION ORAL 2 TIMES DAILY
Status: DISCONTINUED | OUTPATIENT
Start: 2024-02-28 | End: 2024-02-29 | Stop reason: HOSPADM

## 2024-02-28 RX ADMIN — SODIUM CHLORIDE, POTASSIUM CHLORIDE, SODIUM LACTATE AND CALCIUM CHLORIDE: 600; 310; 30; 20 INJECTION, SOLUTION INTRAVENOUS at 15:38

## 2024-02-28 RX ADMIN — QUETIAPINE FUMARATE 100 MG: 100 TABLET ORAL at 17:32

## 2024-02-28 RX ADMIN — PAROXETINE 60 MG: 30 TABLET, FILM COATED ORAL at 19:36

## 2024-02-28 RX ADMIN — POLYETHYLENE GLYCOL 3350 1 PACKET: 17 POWDER, FOR SOLUTION ORAL at 10:35

## 2024-02-28 RX ADMIN — POLYETHYLENE GLYCOL 3350 1 PACKET: 17 POWDER, FOR SOLUTION ORAL at 17:32

## 2024-02-28 RX ADMIN — HYDROMORPHONE HYDROCHLORIDE 1 MG: 1 INJECTION, SOLUTION INTRAMUSCULAR; INTRAVENOUS; SUBCUTANEOUS at 10:36

## 2024-02-28 RX ADMIN — OMEPRAZOLE 40 MG: 20 CAPSULE, DELAYED RELEASE ORAL at 19:36

## 2024-02-28 RX ADMIN — DOCUSATE SODIUM 50 MG AND SENNOSIDES 8.6 MG 1 TABLET: 8.6; 5 TABLET, FILM COATED ORAL at 17:32

## 2024-02-28 RX ADMIN — OXYCODONE AND ACETAMINOPHEN 1 TABLET: 10; 325 TABLET ORAL at 15:30

## 2024-02-28 RX ADMIN — ONDANSETRON 4 MG: 2 INJECTION INTRAMUSCULAR; INTRAVENOUS at 10:36

## 2024-02-28 RX ADMIN — DOCUSATE SODIUM 50 MG AND SENNOSIDES 8.6 MG 1 TABLET: 8.6; 5 TABLET, FILM COATED ORAL at 10:36

## 2024-02-28 RX ADMIN — OXYCODONE AND ACETAMINOPHEN 1 TABLET: 10; 325 TABLET ORAL at 08:29

## 2024-02-28 RX ADMIN — METOCLOPRAMIDE 10 MG: 10 TABLET ORAL at 08:50

## 2024-02-28 RX ADMIN — ONDANSETRON 4 MG: 2 INJECTION INTRAMUSCULAR; INTRAVENOUS at 06:14

## 2024-02-28 RX ADMIN — OXYCODONE AND ACETAMINOPHEN 1 TABLET: 10; 325 TABLET ORAL at 04:18

## 2024-02-28 RX ADMIN — OXYCODONE AND ACETAMINOPHEN 1 TABLET: 10; 325 TABLET ORAL at 19:36

## 2024-02-28 RX ADMIN — OMEPRAZOLE 40 MG: 20 CAPSULE, DELAYED RELEASE ORAL at 08:28

## 2024-02-28 RX ADMIN — SODIUM CHLORIDE, POTASSIUM CHLORIDE, SODIUM LACTATE AND CALCIUM CHLORIDE: 600; 310; 30; 20 INJECTION, SOLUTION INTRAVENOUS at 02:41

## 2024-02-28 RX ADMIN — POTASSIUM CHLORIDE 40 MEQ: 1500 TABLET, EXTENDED RELEASE ORAL at 08:29

## 2024-02-28 RX ADMIN — GABAPENTIN 200 MG: 100 CAPSULE ORAL at 06:14

## 2024-02-28 RX ADMIN — GABAPENTIN 200 MG: 100 CAPSULE ORAL at 10:36

## 2024-02-28 RX ADMIN — GABAPENTIN 200 MG: 100 CAPSULE ORAL at 17:32

## 2024-02-28 RX ADMIN — HYDROMORPHONE HYDROCHLORIDE 1 MG: 1 INJECTION, SOLUTION INTRAMUSCULAR; INTRAVENOUS; SUBCUTANEOUS at 22:47

## 2024-02-28 ASSESSMENT — COGNITIVE AND FUNCTIONAL STATUS - GENERAL
MOVING TO AND FROM BED TO CHAIR: A LOT
SUGGESTED CMS G CODE MODIFIER DAILY ACTIVITY: CK
STANDING UP FROM CHAIR USING ARMS: A LOT
HELP NEEDED FOR BATHING: A LOT
EATING MEALS: A LITTLE
MOVING FROM LYING ON BACK TO SITTING ON SIDE OF FLAT BED: A LOT
TURNING FROM BACK TO SIDE WHILE IN FLAT BAD: A LOT
DRESSING REGULAR UPPER BODY CLOTHING: A LITTLE
WALKING IN HOSPITAL ROOM: A LOT
TOILETING: A LOT
MOBILITY SCORE: 12
SUGGESTED CMS G CODE MODIFIER MOBILITY: CL
DAILY ACTIVITIY SCORE: 16
CLIMB 3 TO 5 STEPS WITH RAILING: A LOT
DRESSING REGULAR LOWER BODY CLOTHING: A LOT

## 2024-02-28 ASSESSMENT — LIFESTYLE VARIABLES
CONSUMPTION TOTAL: NEGATIVE
EVER HAD A DRINK FIRST THING IN THE MORNING TO STEADY YOUR NERVES TO GET RID OF A HANGOVER: NO
HAVE YOU EVER FELT YOU SHOULD CUT DOWN ON YOUR DRINKING: NO
ALCOHOL_USE: NO
TOTAL SCORE: 0
ON A TYPICAL DAY WHEN YOU DRINK ALCOHOL HOW MANY DRINKS DO YOU HAVE: 0
DOES PATIENT WANT TO STOP DRINKING: NO
TOTAL SCORE: 0
TOTAL SCORE: 0
AVERAGE NUMBER OF DAYS PER WEEK YOU HAVE A DRINK CONTAINING ALCOHOL: 0
HAVE PEOPLE ANNOYED YOU BY CRITICIZING YOUR DRINKING: NO
HOW MANY TIMES IN THE PAST YEAR HAVE YOU HAD 5 OR MORE DRINKS IN A DAY: 0
EVER FELT BAD OR GUILTY ABOUT YOUR DRINKING: NO

## 2024-02-28 ASSESSMENT — PATIENT HEALTH QUESTIONNAIRE - PHQ9
8. MOVING OR SPEAKING SO SLOWLY THAT OTHER PEOPLE COULD HAVE NOTICED. OR THE OPPOSITE, BEING SO FIGETY OR RESTLESS THAT YOU HAVE BEEN MOVING AROUND A LOT MORE THAN USUAL: NOT AT ALL
4. FEELING TIRED OR HAVING LITTLE ENERGY: NOT AT ALL
9. THOUGHTS THAT YOU WOULD BE BETTER OFF DEAD, OR OF HURTING YOURSELF: NOT AT ALL
7. TROUBLE CONCENTRATING ON THINGS, SUCH AS READING THE NEWSPAPER OR WATCHING TELEVISION: NOT AT ALL
6. FEELING BAD ABOUT YOURSELF - OR THAT YOU ARE A FAILURE OR HAVE LET YOURSELF OR YOUR FAMILY DOWN: NOT AL ALL
SUM OF ALL RESPONSES TO PHQ QUESTIONS 1-9: 0
2. FEELING DOWN, DEPRESSED, IRRITABLE, OR HOPELESS: NOT AT ALL
3. TROUBLE FALLING OR STAYING ASLEEP OR SLEEPING TOO MUCH: NOT AT ALL
1. LITTLE INTEREST OR PLEASURE IN DOING THINGS: NOT AT ALL
5. POOR APPETITE OR OVEREATING: NOT AT ALL
SUM OF ALL RESPONSES TO PHQ9 QUESTIONS 1 AND 2: 0

## 2024-02-28 ASSESSMENT — PAIN DESCRIPTION - PAIN TYPE
TYPE: CHRONIC PAIN

## 2024-02-28 ASSESSMENT — FIBROSIS 4 INDEX: FIB4 SCORE: .4444444444444444444

## 2024-02-28 NOTE — ASSESSMENT & PLAN NOTE
Patient has significant GI history that could explain GI discomfort.  Her abdominal exam is diffusely tender with mild distention.  Abdominal XR showed moderate to large colonic stool burden.  Differentials include constipation, ulcer, SMA syndrome.  Patient received soapsuds enema overnight but did not have a bowel movement. Positive hemoccult likely due to bleeding from hemorrhoids in the setting of severe constipation.    Most recent EGD 8/23 showed: GERD grade A-B, mild erosion around the feeding tube in stomach, and first portion of duodenum has no bleeding, did not check 2nd portion well due to the J extension in place.     Last colonoscopy in 2018 with records not available.    Plan:  -MiraLAX 1 packet daily twice daily and Senna BID  -Will give additional soap suds enema then perform manual disimpaction  -Zofran every 4 PRN nausea  -Continue clear liquid diet for bowel rest  -Consider adding Relistor for opioid-induced constipation tomorrow if no bowel movement  -Consider GI consult if pain does not improve with bowel movement given her positive stool occult

## 2024-02-28 NOTE — PROGRESS NOTES
[facility] MEDICINE PROGRESS NOTE        Attending:   Ousmane Villanueva M.D.    Resident:   Chely Callejas M.D.    PATIENT:   Mary Martinez; [id number]; 1970    ID:   53 y.o. female with significant GI history including GERD, history of perforated gastric ulcer (2019), s/p cholecystectomy, appendectomy, chronic nausea and vomiting, and failed G-tube originally placed for purposes of feeding in the setting of intractable nausea/vomiting however, failed secondary to infection (Sept 2023) admitted for abdominal pain and dark stools.    SUBJECTIVE:   Patient's patient's hemoglobin dropped to 9.9 this morning.  She denies any current active bleeding.  Patient reports that she has not had a bowel movement since 2/11.  She denies any black tarry stools or blood in her stools prior to that.  The patient reports generalized abdominal pain which has not improved.  She did not have a bowel movement following her enema yesterday.    OBJECTIVE:  Vitals:    02/28/24 0518 02/28/24 0727 02/28/24 0829 02/28/24 0851   BP:  125/68     Pulse:  68     Resp: 18 16 16    Temp:  36.4 °C (97.6 °F)  (P) 37.3 °C (99.2 °F)   TempSrc:  Temporal     SpO2:  95%     Weight:       Height:           Intake/Output Summary (Last 24 hours) at 2/28/2024 0729  Last data filed at 2/28/2024 0617  Gross per 24 hour   Intake 1150 ml   Output --   Net 1150 ml       PHYSICAL EXAM:  General: No acute distress, afebrile, resting comfortably.  Appears older than stated age.  HEENT: NC/AT. EOMI.   Cardiovascular: RRR without murmurs. Normal capillary refill   Respiratory: CTAB  Abdomen: Diffusely tender throughout.  Mild distention.  Soft. No masses  EXT:  VÁSQUEZ, no edema  Skin: No erythema/lesions   Neuro: Non-focal    LABS:  Recent Labs     02/27/24  1650 02/28/24  0308   WBC 7.1 7.2   RBC 3.36* 3.16*   HEMOGLOBIN 10.7* 9.9*   HEMATOCRIT 32.7* 30.6*   MCV 97.3 96.8   MCH 31.8 31.3   RDW 55.2* 55.1*   PLATELETCT 459* 449*   MPV 9.2 9.4   NEUTSPOLYS 66.80  69.40   LYMPHOCYTES 20.30* 18.70*   MONOCYTES 9.60 8.90   EOSINOPHILS 1.80 1.70   BASOPHILS 0.70 0.60     Recent Labs     02/27/24  1650 02/28/24  0308   SODIUM 135 135   POTASSIUM 3.8 3.4*   CHLORIDE 112 112   CO2 9* 10*   BUN 17 13   CREATININE 0.76 0.57   CALCIUM 8.7 8.4*   MAGNESIUM 2.2  --    ALBUMIN 3.2 3.2     Estimated GFR/CRCL = CrCl cannot be calculated (Unknown ideal weight.).  Recent Labs     02/27/24  1650 02/28/24  0308   GLUCOSE 87 98     Recent Labs     02/27/24  1650 02/28/24  0308   ASTSGOT 12 11*   ALTSGPT 9 10   TBILIRUBIN <0.2 0.3   ALKPHOSPHAT 131* 129*   GLOBULIN 4.3* 3.8*   INR 1.23*  --          Recent Labs     02/28/24  1014   XNPJH31O 7.33*   VQNGQY688Z 21.9*   FHNRH554R 100.5*   HUWG2QSY 97.2   ARTHCO3 11*   B3ESAOHBP room   ARTBE -13*     Recent Labs     02/27/24  1650   INR 1.23*   APTT 31.5         IMAGING:  [id number] VIEW   Final Result      Normal bowel gas pattern with a moderate to large colonic stool burden.      DX-CHEST-PORTABLE (1 VIEW)   Final Result      No acute cardiac or pulmonary abnormalities are identified.          MEDS:  Current Facility-Administered Medications   Medication Last Admin    gabapentin (Neurontin) capsule 200 mg 200 mg at 02/28/24 0614    polyethylene glycol/lytes (Miralax) Packet 1 Packet      HYDROmorphone (Dilaudid) injection 1 mg      senna-docusate (Pericolace Or Senokot S) 8.6-50 MG per tablet 1 Tablet      lactated ringers infusion Rate Verify at 02/28/24 0846    ondansetron (Zofran) syringe/vial injection 4 mg 4 mg at 02/28/24 0614    ondansetron (Zofran ODT) dispertab 4 mg 4 mg at 02/27/24 2350    acetaminophen (Tylenol) tablet 500 mg      metoclopramide (Reglan) tablet 10 mg 10 mg at 02/28/24 0850    omeprazole (PriLOSEC) capsule 40 mg 40 mg at 02/28/24 0828    PARoxetine (Paxil) tablet 60 mg 60 mg at 02/27/24 2051    QUEtiapine (SEROquel) tablet 100 mg 100 mg at 02/27/24 2051    oxyCODONE-acetaminophen (Percocet-10)  MG per tablet 1  Tablet 1 Tablet at 02/28/24 0829       ASSESSMENT/PLAN:  53 y.o. female with significant GI history including GERD, history of perforated gastric ulcer (2019), s/p cholecystectomy, appendectomy, chronic nausea and vomiting, and failed G-tube originally placed for purposes of feeding in the setting of intractable nausea/vomiting however, failed secondary to infection (Sept 2023) admitted for abdominal pain and dark stools.      Generalized abdominal pain  Patient has significant GI history that could explain GI discomfort.  Her abdominal exam is diffusely tender with mild distention.  Abdominal XR showed moderate to large colonic stool burden.  Differentials include constipation, ulcer, SMA syndrome.  Patient received soapsuds enema overnight but did not have a bowel movement. Positive hemoccult likely due to bleeding from hemorrhoids in the setting of severe constipation.    Most recent EGD 8/23 showed: GERD grade A-B, mild erosion around the feeding tube in stomach, and first portion of duodenum has no bleeding, did not check 2nd portion well due to the J extension in place.     Last colonoscopy in 2018 with records not available.    Plan:  -MiraLAX 1 packet daily twice daily and Senna BID  -Will give additional soap suds enema then perform manual disimpaction  -Zofran every 4 PRN nausea  -Continue clear liquid diet for bowel rest  -Consider adding Relistor for opioid-induced constipation tomorrow if no bowel movement  -Consider GI consult if pain does not improve with bowel movement given her positive stool occult    Metabolic acidosis with normal anion gap and bicarbonate losses  Patient has a bicarb of 9 with a normal anion gap on presentation.  On chart review, patient has had a metabolic acidosis with both normal and elevated anion gap's.  The patient is not currently having diarrhea or vomiting so it is unlikely to be due to GI losses.      Plan:  -ABG ordered  -Initiated workup for RTA: Urine electrolytes,  "urine pH, and urinalysis    Anxiety  Continue home paroxetine 30 mg daily    Insomnia  Continue home Seroquel 100 mg nightly.    Gastroesophageal reflux disease without esophagitis  Will continue home omeprazole, however at 40 mg twice daily scheduled dose, given possible upper GI bleed.    Severe protein-calorie malnutrition (HCC)  #Failure to thrive  #Low BMI, 15  Chronic, in the setting of numerous comorbidities.  History of starvation ketoacidosis.  Nutrition consulted previously and patient states that she continues to try to increase p.o. intake as advised.     Chronic pain syndrome  Patient requested \"something more than her home regimen\" to help control her discomfort. S/p morphine 4mg x2 since admission. Reporting increased pain this morning and had a discussion with patient that additional pain medications would worsen constipation.     Plan:  -Continue home med oxy-acetaminophen  q4hrs PRN pain.  -Add Gabapentin 200mg TID      Chely Callejas M.D.   "

## 2024-02-28 NOTE — ED NOTES
Pt refused oxycodone stating if that is the only pain medication she is going to receive she would prefer to be discharged and go home to the comfort of her own bed as she has these medications at home. Pt requested to speak with admitting MD who I contacted and requested to speak with pt over the phone. I provided pt the phone to discuss her concerns with Dr. Peter where she continued to reiterate her desire to go home if she will not be medicated differently than at home. Pt stated she will not AMA and that she would like Dr. Peter to come to bedside to discharge her. I relayed this information to Dr. Peter and she is coming to bedside.

## 2024-02-28 NOTE — ASSESSMENT & PLAN NOTE
#Failure to thrive  #Low BMI, 15  Chronic, in the setting of numerous comorbidities.  History of starvation ketoacidosis.  Nutrition consulted previously and patient states that she continues to try to increase p.o. intake as advised.

## 2024-02-28 NOTE — ASSESSMENT & PLAN NOTE
"Patient requested \"something more than her home regimen\" to help control her discomfort. S/p morphine 4mg x2 since admission. Reporting increased pain this morning and had a discussion with patient that additional pain medications would worsen constipation.     Plan:  -Continue home med oxy-acetaminophen  q4hrs PRN pain.  -Add Gabapentin 200mg TID  "

## 2024-02-28 NOTE — DIETARY
"Nutrition services: Day 1 of admit.  Mary Martinez is a 53 y.o. female with admitting DX of heme positive stool.     Consult received for BMI <19 and MST score of 2 for reported unintentional wt loss of 14-23 lb x >1 year.    Pt was evaluated for malnutrition by [facility] on 2/8/24. Pt had reported losing wt at the beginning of 2023 and has since been unable to gain it back. Pt noted typically consuming 4-5 small meals per day, and not liking oral nutrition supplements.    Pt has gained ~6 lb since 2/8.    Assessment:  Height: 160 cm (5' 3\")  Weight: 43.3 kg (95 lb 7.4 oz) taken via bed scale on 2/28  Body mass index is 16.91 kg/m². BMI classification: underweight  Diet/Intake: Clear liquid / no PO yet recorded in ADLs    Evaluation:   Pt BIB [facility] for upper GI bleed, abdominal pain, and n/v.  Pt historically had G-tube placed in the setting of intractable n/v, however this failed secondary to infection in September 2023. Pt also has a hx of starvation ketoacidosis.   Noted in MD progress note today that pt \"states she continues to increase p.o. intake as advised\" following previous nutrition consults.  Pt's previous meal requests and preferences have already been loaded into K[facility]  program.  Per chart review, pt's wt has fluctuated between 85-109lb over the past year. Pt  has lost 14 lb (12.8% BW) in the past 12 months, which is not significant.  Wt Readings from Last 27 Encounters:   02/28/24 43.3 kg (95 lb 7.4 oz)   02/16/24 39.5 kg (87 lb)   02/12/24 39.9 kg (88 lb)   02/10/24 40.4 kg (89 lb)   02/07/24 40.5 kg (89 lb 4.6 oz)   01/06/24 40.2 kg (88 lb 10 oz)   11/13/23 43 kg (94 lb 12.8 oz)   10/26/23 38.6 kg (85 lb 3.2 oz)   10/03/23 40.1 kg (88 lb 8 oz)   09/20/23 39 kg (86 lb)   09/14/23 39.2 kg (86 lb 6.7 oz)   08/24/23 40.1 kg (88 lb 6.5 oz)   08/16/23 39.2 kg (86 lb 6.7 oz)   07/27/23 37.6 kg (83 lb)   06/29/23 36.3 kg (80 lb)   06/27/23 46 kg (101 lb 6.6 oz)   06/26/23 42.6 kg (93 " lb 14.7 oz)   06/10/23 38.7 kg (85 lb 5.1 oz)   05/03/23 44.5 kg (98 lb 3.2 oz)   04/19/23 45.4 kg (100 lb)   04/07/23 46.6 kg (102 lb 12.8 oz)   03/31/23 48.5 kg (107 lb)   03/26/23 46.9 kg (103 lb 6.3 oz)   03/22/23 48.9 kg (107 lb 12.9 oz)   03/07/23 49.5 kg (109 lb 3.2 oz)   02/24/23 47.7 kg (105 lb 3.2 oz)   02/19/23 49.8 kg (109 lb 12.6 oz)   Labs: potassium 3.4  Meds: LR @75 mL/hr, Reglan, Zofran, Miralax, senna-docusate  Edema: trace BUE and BLE  Last BM: 2/26    Malnutrition Risk: Pt continues to meet ASPEN criteria as determined 2/28: Pt meets ASPEN criteria for 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    Recommendations/Plan:  Diet advancement per MD.  RD to add multiple small meal modifier per pt preference.  Recommend against ordering oral nutrition supplements as she will not drink them.  Encourage intake of >50% of meals  Document intake of all meals as % taken in ADL's to provide interdisciplinary communication across all shifts.   Monitor weight.  Nutrition rep will continue to see patient for ongoing meal and snack preferences.       RD following

## 2024-02-28 NOTE — ED TRIAGE NOTES
"Chief Complaint   Patient presents with    Upper GI Bleed     Dark tarry stool last night, prior constipation, blood in emesis, hx ulcer, SBO, GERD    Abdominal Pain     Constant/chronic abd pain, recent hosp admission and discharge 2/12, takes oxy for pain, given 50mcg fentanyl with EMS        BIB [facility] for above complaint. Extensive GI hx. Hx RA. Given 12.5mg phenergen IM with EMS. EMS vitals 108/72 HR 86 98% RA gcs 15 glucose 110    GI bleeding protocols ordered. Labs drawn.      /55   Pulse 77   Temp 37.3 °C (99.2 °F) (Temporal)   Resp 15   Ht 1.6 m (5' 3\")   Wt 39.9 kg (88 lb)   LMP 09/21/2011   SpO2 96%   BMI 15.59 kg/m²      "

## 2024-02-28 NOTE — PROGRESS NOTES
Bedside report from night RN, pt care assumed. VSS on RA, pt assessment complete. Pt A&Ox4,  c/o 8/10 pain at this time. POC discussed with pt and verbalizes no questions. Pt denies any additional needs at this time. Bed locked and in lowest position, bed alarm on. Pt educated on fall risk and verbalized understanding, call light within reach, hourly rounding initiated.     Patient having trouble having BM enema ordered, patient received pain medication IV for enema. Pt had 10 golf ball size bowel movements and Notified MD     Patient refused bed alarm, charge rn notified     Patient refused mepilex for heels and coccyx area cream applied    Patient refused ambulation due to pain and bowel movements

## 2024-02-28 NOTE — ED PROVIDER NOTES
ED Provider Note    CHIEF COMPLAINT  Chief Complaint   Patient presents with    Upper GI Bleed     Dark tarry stool last night, prior constipation, blood in emesis, hx ulcer, SBO, GERD    Abdominal Pain     Constant/chronic abd pain, recent hosp admission and discharge 2/12, takes oxy for pain, given 50mcg fentanyl with EMS       EXTERNAL RECORDS REVIEWED  Inpatient Notes discharge summary from February 14, admitted for intractable nausea vomiting and abdominal pain.  Past history of chronic abdominal pain, bleeding ulcer, hiatal hernia GERD, pancreatitis, narcotic dependence    HPI/ROS  LIMITATION TO HISTORY   Select: : None  OUTSIDE HISTORIAN(S):  None    Mary Martinez is a 53 y.o. female who presents with complaint of worsening epigastric pain.  She states she normally has upper abdominal pain, takes oral narcotics for this.  Last night developed sharp increase of pain in the epigastric region over a wider area than normal.  She then had dark tarry stool.  No bowel movement today.  She has however had worsening nausea with vomiting since then.  She states she threw up her medicines this morning.  She normally takes Prilosec.  No hematuria.  Patient states she feels dehydrated.  She denies fever.  This is her fourth visit to the ER this month, has had 2 previous admissions this month.  No trauma.  No difficulty breathing.  No chest pain.    PAST MEDICAL HISTORY   has a past medical history of Acute renal failure (ARF) ([facility]), Acute renal failure (HCC) (10/06/2011), Allergy, Anemia, Anxiety, Arthritis, ASTHMA, Blood transfusion without reported diagnosis, Bowel habit changes, Bronchitis (last approx 2018), Chronic pain (04/24/2020), Dental disorder, Depression, GERD (gastroesophageal reflux disease), GIB (gastrointestinal bleeding), Head ache, Heart burn, Hiatal hernia, Hiatus hernia syndrome, History of pancreatitis, Hypokalemia, Hyponatremia, Idiopathic chronic pancreatitis (HCC), Indigestion, Intractable  nausea and vomiting, Kidney disease, Leukocytosis (10/08/2011), Pain, Pneumonia (10/2019), PONV (postoperative nausea and vomiting), Psychiatric problem, Rheumatoid aortitis, Rheumatoid arthritis(714.0) (2003), Severe protein-calorie malnutrition (HCC), Small bowel obstruction (HCC) (10/06/2011), SMAS (superior mesenteric artery syndrome) (HCC), Substance abuse ([facility]), UTI (urinary tract infection) (05/25/2023), and Vitamin B12 deficiency neuropathy ([facility]).    SURGICAL HISTORY   has a past surgical history that includes abdominal abscess drainage (9/27/2011); toe fusion (Right, 5/27/2015); bone spur excision (5/27/2015); hammertoe correction (5/27/2015); foot reconstruction rheumatic (Left, 7/29/2015); arthrodesis (Right, 5/6/2016); fusion, pip joint, toe (Right, 8/29/2016); bone graft (Right, 8/29/2016); irrigation & debridement ortho (Right, 9/11/2016); finger amputation (Right, 9/14/2016); finger arthroplasty (Right, 4/17/2017); finger arthroplasty (Right, 6/5/2017); finger amputation (6/5/2017); exploratory of abdomen (N/A, 10/4/2019); tonsillectomy (1982); primary c section (1991); repeat c section (1999); repeat c section (2005); repeat c section (2008); appendectomy (2011); nicholas by laparoscopy (9/27/2011); wrist exploration (Left, 1980's); colonoscopy (2018); dental extraction(s) (2014); endoscopic us exam, esoph (4/29/2020); gastroscopy-endo (4/29/2020); egd with asp/bx (4/29/2020); upper gi endoscopy,diagnosis (N/A, 9/9/2022); egd w/endoscopic ultrasound (N/A, 9/9/2022); place percut gastrostomy tube (N/A, 6/12/2023); upper gi endoscopy,diagnosis (6/12/2023); and upper gi endoscopy,diagnosis (N/A, 8/16/2023).    FAMILY HISTORY  Family History   Problem Relation Age of Onset    Cancer Mother     Heart Disease Mother     Hypertension Mother     Heart Disease Father     Hypertension Father        SOCIAL HISTORY  Social History     Tobacco Use    Smoking status: Former     Current packs/day: 0.00     Average  "packs/day: 0.5 packs/day for 30.0 years (15.0 ttl pk-yrs)     Types: Cigarettes     Quit date: 2023     Years since quittin.2     Passive exposure: Never    Smokeless tobacco: Never   Vaping Use    Vaping Use: Never used   Substance and Sexual Activity    Alcohol use: Never    Drug use: Never    Sexual activity: Not on file       CURRENT MEDICATIONS  Home Medications       Reviewed by Rossana Diallo R.N. (Registered Nurse) on 24 at 1700  Med List Status: Partial     Medication Last Dose Status   acetaminophen (TYLENOL) 325 MG Tab  Active   metoclopramide (REGLAN) 10 MG Tab  Active   omeprazole (PRILOSEC) 40 MG delayed-release capsule  Active   ondansetron (ZOFRAN ODT) 4 MG TABLET DISPERSIBLE  Active   oxyCODONE-acetaminophen (PERCOCET)  MG Tab  Active   oxyCODONE-acetaminophen (PERCOCET-10)  MG Tab  Active   oxyCODONE-acetaminophen (PERCOCET-10)  MG Tab  Active   PARoxetine (PAXIL) 30 MG Tab  Active   QUEtiapine (SEROQUEL) 100 MG Tab  Active                    ALLERGIES  Allergies   Allergen Reactions    Penicillins Anaphylaxis and Hives     Tolerates cephalosporins; reports throat swelling with PCN    Aripiprazole Nausea     Spasms, shaking      Nitrous Oxide Vomiting       PHYSICAL EXAM  VITAL SIGNS: BP 98/59   Pulse 78   Temp 37.3 °C (99.2 °F) (Temporal)   Resp 14   Ht 1.6 m (5' 3\")   Wt 39.9 kg (88 lb)   LMP 2011   SpO2 96%   BMI 15.59 kg/m²    Constitutional: Ill-appearing, malnourished  Cardiac: Regular rate and rhythm  Respiratory: Clear lung sounds  GI: Moderate epigastric tenderness.  Mild lower abdominal tenderness.  Rectal exam performed with female nursing staff present showed heme positive stool, trace amounts.  Difficult to assess whether melanotic or not  Neurologic: Strength and sensation intact, speech clear.  Psychiatric: Normal mood  Musculoskeletal: Prior amputations of multiple fingers right hand.  No flank tenderness  ENT: Dry mucous " membranes    DIAGNOSTIC STUDIES / PROCEDURES      LABS  Results for orders placed or performed during the hospital encounter of 02/27/24   COD (ADULT)   Result Value Ref Range    ABO Grouping Only O     Rh Grouping Only NEG     Antibody Screen-Cod NEG    CBC WITH DIFFERENTIAL   Result Value Ref Range    WBC 7.1 4.8 - 10.8 K/uL    RBC 3.36 (L) 4.20 - 5.40 M/uL    Hemoglobin 10.7 (L) 12.0 - 16.0 g/dL    Hematocrit 32.7 (L) 37.0 - 47.0 %    MCV 97.3 81.4 - 97.8 fL    MCH 31.8 27.0 - 33.0 pg    MCHC 32.7 32.2 - 35.5 g/dL    RDW 55.2 (H) 35.9 - 50.0 fL    Platelet Count 459 (H) 164 - 446 K/uL    MPV 9.2 9.0 - 12.9 fL    Neutrophils-Polys 66.80 44.00 - 72.00 %    Lymphocytes 20.30 (L) 22.00 - 41.00 %    Monocytes 9.60 0.00 - 13.40 %    Eosinophils 1.80 0.00 - 6.90 %    Basophils 0.70 0.00 - 1.80 %    Immature Granulocytes 0.80 0.00 - 0.90 %    Nucleated RBC 0.00 0.00 - 0.20 /100 WBC    Neutrophils (Absolute) 4.72 1.82 - 7.42 K/uL    Lymphs (Absolute) 1.44 1.00 - 4.80 K/uL    Monos (Absolute) 0.68 0.00 - 0.85 K/uL    Eos (Absolute) 0.13 0.00 - 0.51 K/uL    Baso (Absolute) 0.05 0.00 - 0.12 K/uL    Immature Granulocytes (abs) 0.06 0.00 - 0.11 K/uL    NRBC (Absolute) 0.00 K/uL   COMP METABOLIC PANEL   Result Value Ref Range    Sodium 135 135 - 145 mmol/L    Potassium 3.8 3.6 - 5.5 mmol/L    Chloride 112 96 - 112 mmol/L    Co2 9 (LL) 20 - 33 mmol/L    Anion Gap 14.0 7.0 - 16.0    Glucose 87 65 - 99 mg/dL    Bun 17 8 - 22 mg/dL    Creatinine 0.76 0.50 - 1.40 mg/dL    Calcium 8.7 8.5 - 10.5 mg/dL    Correct Calcium 9.3 8.5 - 10.5 mg/dL    AST(SGOT) 12 12 - 45 U/L    ALT(SGPT) 9 2 - 50 U/L    Alkaline Phosphatase 131 (H) 30 - 99 U/L    Total Bilirubin <0.2 0.1 - 1.5 mg/dL    Albumin 3.2 3.2 - 4.9 g/dL    Total Protein 7.5 6.0 - 8.2 g/dL    Globulin 4.3 (H) 1.9 - 3.5 g/dL    A-G Ratio 0.7 g/dL   LIPASE   Result Value Ref Range    Lipase 40 11 - 82 U/L   PROTHROMBIN TIME   Result Value Ref Range    PT 15.6 (H) 12.0 - 14.6 sec     INR 1.23 (H) 0.87 - 1.13   APTT   Result Value Ref Range    APTT 31.5 24.7 - 36.0 sec   ESTIMATED GFR   Result Value Ref Range    GFR (CKD-EPI) 94 >60 mL/min/1.73 m 2         RADIOLOGY  I have independently interpreted the diagnostic imaging associated with this visit and am waiting the final reading from the radiologist.   My preliminary interpretation is as follows: Chest x-ray negative for pneumonia  Radiologist interpretation:   DX-CHEST-PORTABLE (1 VIEW)   Final Result      No acute cardiac or pulmonary abnormalities are identified.            COURSE & MEDICAL DECISION MAKING    ED Observation Status? No; Patient does not meet criteria for ED Observation.     INITIAL ASSESSMENT, COURSE AND PLAN  Care Narrative: Patient presents with increase of epigastric pain.  Review of her chart shows extensive abdominal problems in the past with multiple prior surgeries.  Epigastric pain with dark stool concerning for upper GI bleed.  She does not have elevated BUN to creatinine ratio.  She did sustain heme positive on rectal exam although minimal sample.  Hemoglobin is stable at 10.7, she is chronically anemic.  Low CO2 of 9 is of unknown etiology.  Patient was given dose of morphine at her request, IV fluids provided secondary to possible early dehydration, history of vomiting with dry mucous membranes today.  Given her history, heme positive stool, baseline anemia, plan for admission for serial hemoglobin, GI consultation or blood transfusion if needed.  HYDRATION: Based on the patient's presentation of Acute Vomiting and Dehydration the patient was given IV fluids. IV Hydration was used because oral hydration was not adequate alone. Upon recheck following hydration, the patient was stable.        DISPOSITION AND DISCUSSIONS  I have discussed management of the patient with the following physicians and HALIMA's: Admitting family medicine service      FINAL DIAGNOSIS  1. Epigastric pain    2. Heme positive stool            Electronically signed by: Madhu Burgess M.D., 2/27/2024 5:58 PM

## 2024-02-28 NOTE — ASSESSMENT & PLAN NOTE
Will continue home omeprazole, however at 40 mg twice daily scheduled dose, given possible upper GI bleed.

## 2024-02-28 NOTE — ED NOTES
Pt pre-medicated and soap dereje enema administered. Small bit of stool removed but pt was unable to hold fluid for long. MD updated

## 2024-02-28 NOTE — ASSESSMENT & PLAN NOTE
Patient has a bicarb of 9 with a normal anion gap on presentation.  On chart review, patient has had a metabolic acidosis with both normal and elevated anion gap's.  The patient is not currently having diarrhea or vomiting so it is unlikely to be due to GI losses.      Plan:  -ABG ordered  -Initiated workup for RTA: Urine electrolytes, urine pH, and urinalysis

## 2024-02-28 NOTE — ED NOTES
Med Rec complete per patient with family at bedside   Allergies reviewed  Antibiotics in the past 30 days:no  Anticoagulant in past 14 days:no  Pharmacy patient utilizes:[facility] [address]    Pt states she takes Omeprazole 40 mg BID    Pt states she took 2 tylenol today

## 2024-02-28 NOTE — PROGRESS NOTES
4 Eyes Skin Assessment Completed by Maci RN and LEANNE Oliveros.    Head WDL  Ears WDL  Nose WDL  Mouth WDL  Neck WDL  Breast/Chest WDL  Shoulder Blades WDL  Spine WDL  (R) Arm/Elbow/Hand ring finger to 5th finger amputated  (L) Arm/Elbow/Hand Redness and Scab, swelling/stiffness (Rheumatoid arthritis)  Abdomen Scar  Groin WDL  Scrotum/Coccyx/Buttocks WDL  (R) Leg Swelling  (L) Leg Swelling  (R) Heel/Foot/Toe Swelling/stiffness  (Rheumatoid arthritis)  (L) Heel/Foot/Toe Swelling/stiffness  (Rheumatoid arthritis)          Devices In Places Blood Pressure Cuff      Interventions In Place N/A    Possible Skin Injury No    Pictures Uploaded Into Epic Yes  Wound Consult Placed N/A  RN Wound Prevention Protocol Ordered No

## 2024-02-28 NOTE — CARE PLAN
The patient is Watcher - Medium risk of patient condition declining or worsening    Shift Goals  Clinical Goals: Patient will verbalize tolerable level of pain 4/10.  Patient Goals: pain control  Family Goals: none present    Progress made toward(s) clinical / shift goals: Oxycodone 10/345mg tab. PO given and repositioned patient comfortably on bed. At 4:30 am, patient seen comfortably sleeping without any signs of discomfort. Will give scheduled Gabapentin 200mg tab. PO at 6am.    Patient is not progressing towards the following goals: N/A

## 2024-02-28 NOTE — PROGRESS NOTES
Patient arrived via armando, A&O x4, IV on left forearm G20, flushed wnl. Oriented to call light system, bed rails up, bed alarm on, bed wheels locked an in low position.

## 2024-02-28 NOTE — H&P
"    FAMILY MEDICINE HISTORY AND PHYSICAL       PATIENT ID:  NAME:  Mary Martinez  MRN:               [id number]  YOB: 1970    Date of Admission: 2/27/2024     Attending: Ousmane Villanueva MD    Resident: Yoselin Peter M.D.  Primary Care Physician:  Stormy Gotti M.D.    CC:    Chief Complaint   Patient presents with    Upper GI Bleed     Dark tarry stool last night, prior constipation, blood in emesis, hx ulcer, SBO, GERD    Abdominal Pain     Constant/chronic abd pain, recent hosp admission and discharge 2/12, takes oxy for pain, given 50mcg fentanyl with EMS     HPI: Mary Martinez is a 53 y.o. female (appears older than documented age) with past medical history significant for GERD (on omeprazole), anxiety/depression (on paroxetine), insomnia (on quetiapine), extensive GI history including ulcers/bleeding/small bowel obstruction/hiatal hernia/multiple EGDs/colonoscopies, Rheumatoid Arthritis leading to right transmetacarpal amputation of right hand (sparing thumb, following multiple corrective hand surgeries) and chronic pain syndrome (on Oxycodone/Acetaminophen 10-325mg) who presented with complaints of abdominal pain with hard dark stools \"the size of golf balls that hurt like hell to get out and I haven't pooped since I was discharged on the 11th or whatever\".  \"My stools were dark but I did not see any blood\".  States some associated nausea and episodes of vomiting that she states is her normal. She states her abdominal discomfort is different this time, and that she hasn't felt this pain before. She states her pain is usually epigastric and in lower abdomen, as it is today. No other complaints were reported during history taking this evening.    During previous hospitalization patient was not seen by GI, however, prior recent admission on 2/7/2024 patient was seen by GI. Of note, per chart review, patient is well known to [facility] team and has had multiple abdominal exams (SBFT and CT scan in 2022) " that showed no evidence SMA syndrome, as mentioned in previous charts. I updated patient with this information during today's admission.    ERCourse:  CBC, CMP, mag, lipase -all within normal limits.  Hemoglobin 10.7; stable since last admission on 2/12/2024.  Per ED Dr. Burgess; Hemoccult positive.  No lab results seen in chart.  Chest x-ray shows no cardiopulmonary abnormalities  Morphine 4 mg IV x1     [facility] was consulted for inpatient admission.    REVIEW OF SYSTEMS:   Ten systems reviewed and were negative except as noted in the HPI.                PAST MEDICAL HISTORY:   has a past medical history of Acute renal failure (ARF) ([facility]), Acute renal failure (HCC) (10/06/2011), Allergy, Anemia, Anxiety, Arthritis, ASTHMA, Blood transfusion without reported diagnosis, Bowel habit changes, Bronchitis (last approx 2018), Chronic pain (04/24/2020), Dental disorder, Depression, GERD (gastroesophageal reflux disease), GIB (gastrointestinal bleeding), Head ache, Heart burn, Hiatal hernia, Hiatus hernia syndrome, History of pancreatitis, Hypokalemia, Hyponatremia, Idiopathic chronic pancreatitis ([facility]), Indigestion, Intractable nausea and vomiting, Kidney disease, Leukocytosis (10/08/2011), Pain, Pneumonia (10/2019), PONV (postoperative nausea and vomiting), Psychiatric problem, Rheumatoid aortitis, Rheumatoid arthritis(714.0) (2003), Severe protein-calorie malnutrition ([facility]), Small bowel obstruction (HCC) (10/06/2011), SMAS (superior mesenteric artery syndrome) ([facility]), Substance abuse ([facility]), UTI (urinary tract infection) (05/25/2023), and Vitamin B12 deficiency neuropathy ([facility]).     PAST SURGICAL HISTORY:   has a past surgical history that includes abdominal abscess drainage (9/27/2011); toe fusion (Right, 5/27/2015); bone spur excision (5/27/2015); hammertoe correction (5/27/2015); foot reconstruction rheumatic (Left, 7/29/2015); arthrodesis (Right, 5/6/2016); fusion, pip joint, toe (Right, 8/29/2016); bone  graft (Right, 8/29/2016); irrigation & debridement ortho (Right, 9/11/2016); finger amputation (Right, 9/14/2016); finger arthroplasty (Right, 4/17/2017); finger arthroplasty (Right, 6/5/2017); finger amputation (6/5/2017); pr exploratory of abdomen (N/A, 10/4/2019); tonsillectomy (1982); primary c section (1991); repeat c section (1999); repeat c section (2005); repeat c section (2008); appendectomy (2011); nicholas by laparoscopy (9/27/2011); wrist exploration (Left, 1980's); colonoscopy (2018); dental extraction(s) (2014); pr endoscopic us exam, esoph (4/29/2020); gastroscopy-endo (4/29/2020); egd with asp/bx (4/29/2020); pr upper gi endoscopy,diagnosis (N/A, 9/9/2022); egd w/endoscopic ultrasound (N/A, 9/9/2022); pr place percut gastrostomy tube (N/A, 6/12/2023); pr upper gi endoscopy,diagnosis (6/12/2023); and pr upper gi endoscopy,diagnosis (N/A, 8/16/2023).     FAMILY HISTORY:  family history includes Cancer in her mother; Heart Disease in her father and mother; Hypertension in her father and mother.     SOCIAL HISTORY:   Employment: Unemployed  Living Situation: lives at home with her 5 children. She is the homeowner. Patient's son works in ED as a Tech who also lives with her.  Smoking: Denies   Etoh: Denies  Recreational Drug: Denies    DIET:   Orders Placed This Encounter   Procedures    Diet Order Diet: Clear Liquid     Standing Status:   Standing     Number of Occurrences:   1     Order Specific Question:   Diet:     Answer:   Clear Liquid [10]       ALLERGIES:  Allergies   Allergen Reactions    Penicillins Anaphylaxis and Hives     Tolerates cephalosporins; reports throat swelling with PCN    Aripiprazole Nausea     Spasms, shaking      Nitrous Oxide Vomiting       OUTPATIENT MEDICATIONS:    Current Facility-Administered Medications:     lactated ringers infusion, , Intravenous, Continuous, Yoselin Peter M.D., Last Rate: 75 mL/hr at 02/27/24 2050, New Bag at 02/27/24 2050    ondansetron (Zofran)  syringe/vial injection 4 mg, 4 mg, Intravenous, Q4HRS PRN, Yoselin Peter M.D.    ondansetron (Zofran ODT) dispertab 4 mg, 4 mg, Oral, Q4HRS PRN, Yoselin Peter M.D., 4 mg at 02/27/24 2350    acetaminophen (Tylenol) tablet 500 mg, 500 mg, Oral, Q6HRS PRN, Yoselin Peter M.D.    metoclopramide (Reglan) tablet 10 mg, 10 mg, Oral, 4X/DAY PRN, Yoselin Peter M.D.    omeprazole (PriLOSEC) capsule 40 mg, 40 mg, Oral, BID, Yoselin Peter M.D., 40 mg at 02/27/24 2051    PARoxetine (Paxil) tablet 60 mg, 60 mg, Oral, QHS, Yoselin Peter M.D., 60 mg at 02/27/24 2051    QUEtiapine (SEROquel) tablet 100 mg, 100 mg, Oral, Q EVENING, Yoselin Peter M.D., 100 mg at 02/27/24 2051    oxyCODONE-acetaminophen (Percocet-10)  MG per tablet 1 Tablet, 1 Tablet, Oral, Q4HRS PRN, Yoselin Peter M.D., 1 Tablet at 02/27/24 2350    Current Outpatient Medications:     QUEtiapine (SEROQUEL) 100 MG Tab, Take 1 Tablet by mouth every evening., Disp: 90 Tablet, Rfl: 0    [START ON 3/18/2024] oxyCODONE-acetaminophen (PERCOCET)  MG Tab, Take 1 Tablet by mouth every four hours as needed for Severe Pain for up to 30 days. Indications: Pain, Disp: 150 Tablet, Rfl: 0    metoclopramide (REGLAN) 10 MG Tab, Take 1 Tablet by mouth 4 times a day as needed (nausea and vomitng)., Disp: 30 Tablet, Rfl: 0    ondansetron (ZOFRAN ODT) 4 MG TABLET DISPERSIBLE, Take 1 Tablet by mouth every 6 hours as needed for Nausea/Vomiting., Disp: 30 Tablet, Rfl: 0    omeprazole (PRILOSEC) 40 MG delayed-release capsule, Take 40 mg by mouth 2 times a day., Disp: , Rfl:     PARoxetine (PAXIL) 30 MG Tab, TAKE 2 TABLETS(60 MG) BY MOUTH EVERY EVENING (Patient taking differently: Take 60 mg by mouth at bedtime.), Disp: 180 Tablet, Rfl: 0    acetaminophen (TYLENOL) 325 MG Tab, Take 325-650 mg by mouth every 6 hours as needed. Indications: Pain, Disp: , Rfl:     [START ON 4/18/2024] oxyCODONE-acetaminophen (PERCOCET-10)  MG Tab, Take 1 Tablet by mouth every four hours as needed  for Severe Pain for up to 30 days. Indications: Pain, Disp: 150 Tablet, Rfl: 0    oxyCODONE-acetaminophen (PERCOCET-10)  MG Tab, Take 1 Tablet by mouth every four hours as needed for Severe Pain for up to 30 days. Indications: Pain, Disp: 150 Tablet, Rfl: 0    PHYSICAL EXAM:  Vitals:    24 2103 24 2328 24 2343   BP: 91/58 103/55  99/58   Pulse: 79 84 86 89   Resp:  15     Temp:       TempSrc:       SpO2: 97% 97% 96% 96%   Weight:       Height:       , Temp (24hrs), Av.3 °C (99.2 °F), Min:37.3 °C (99.2 °F), Max:37.3 °C (99.2 °F)  , Pulse Oximetry: 96 %, O2 (LPM): 0, O2 Delivery Device: None - Room Air    Physical Exam  General: Frail, ill-appearing, cachectic, laying in bed in no acute distress.  HENT:      Head: Normocephalic and atraumatic.      Mouth: Mucous membranes are moist.   Eyes:      Pupils: Pupils are equal, round, and reactive to light.   Cardiovascular:      Rate and Rhythm: Normal rate and regular rhythm.   Pulmonary:      Effort: Pulmonary effort is normal. No respiratory distress.      Breath sounds: Clear breath sounds bilaterally.   Abdominal:      General: Abdomen is flat.  No distention.  Moderate epigastric tenderness to light palpation.  Moderate abdominal tenderness to light palpation.  Bowel sounds positive.  No masses palpable.     Palpations: Abdomen is soft.   Musculoskeletal:         General: Normal range of motion.      Cervical back: Normal range of motion.      Right lower leg: No edema.      Left lower leg: No edema.   Skin:     General: Skin is warm and dry.      Capillary Refill: Capillary refill takes less than 2 seconds.      Coloration: Skin is not pale.   Neurological:      General: No focal deficit present.      Mental Status: She is alert and oriented to person, place, and time. Mental status is at baseline.   Psychiatric:         Mood and Affect: Mood normal.         Behavior: Behavior normal.         Thought Content: Thought content  "normal.      LAB TESTS:   Recent Labs     02/27/24  1650   WBC 7.1   RBC 3.36*   HEMOGLOBIN 10.7*   HEMATOCRIT 32.7*   MCV 97.3   MCH 31.8   MCHC 32.7   RDW 55.2*   PLATELETCT 459*   MPV 9.2     Recent Labs     02/27/24  1650   SODIUM 135   POTASSIUM 3.8   CHLORIDE 112   CO2 9*   GLUCOSE 87   BUN 17   CREATININE 0.76   CALCIUM 8.7     Recent Labs     02/27/24  1650   ALTSGPT 9   ASTSGOT 12   ALKPHOSPHAT 131*   TBILIRUBIN <0.2   LIPASE 40   GLUCOSE 87     Recent Labs     02/27/24  1650   APTT 31.5   INR 1.23*     No results for input(s): \"NTPROBNP\" in the last 72 hours.      No results for input(s): \"TROPONINT\" in the last 72 hours.    CULTURES:   Results       ** No results found for the last 168 hours. **          IMAGES:  [id number] VIEW   Final Result      Normal bowel gas pattern with a moderate to large colonic stool burden.      DX-CHEST-PORTABLE (1 VIEW)   Final Result      No acute cardiac or pulmonary abnormalities are identified.        CONSULTS:   None    ASSESSMENT/PLAN:   Mary Martinez is a 53 y.o. female who was admitted for:     Generalized abdominal pain  Assessment & Plan  Patient has significant GI history that could explain GI discomfort, however, given pain localized to lower abdomen + findings on my physical exam, will consider chronic constipation secondary to chronic opiod use as likely etiology at this time.   Abdominal XR showed: Normal bowel gas pattern with a moderate to large colonic stool burden.     Plan:  -Soapsuds enema  -Continue home oxy/acetaminophen  mg q4hrs PNR pain  -Zofran every 4 PRN nausea  -Bowel rest; Clear Liquid diet   -Dayteam to consider start of Relistor for opiod-induced constipation, if not relieved by enema overnight.  -Hemoglobin stable since recent discharge on 2/14/2024, thus positive hemoccult likely due to bleeding from hemorrhoids in the setting of severe constipation. However, Dayteam to consider GI consult in AM.    Metabolic acidosis with normal " "anion gap and bicarbonate losses  Assessment & Plan  Secondary to GI losses. Bicarb 9.    Plan:  -Continue to monitor with daily BMP and replete as indicated    Gastroesophageal reflux disease without esophagitis  Assessment & Plan  Will continue home omeprazole, however at 40 mg twice daily scheduled dose, given possible upper GI bleed.    Severe protein-calorie malnutrition (HCC)  Assessment & Plan  #Failure to thrive  #Low BMI, 15  Chronic, in the setting of numerous comorbidities.  History of starvation ketoacidosis.  Nutrition consulted previously and patient states that she continues to try to increase p.o. intake as advised.     Chronic pain syndrome  Assessment & Plan  Patient requested \"something more than her home regimen\" to help control her discomfort. S/p morphine 4mg x2 since admission. Will hold off on further opiod medications, given severe constipation. Patient updated with this plan and agreeable. Currently pain well controlled.     Plan:  -Continue home med oxy-acetaminophen  q4hrs PRN pain.  -Dayteam to consider addition of Gabapentin and/or Ketamine; both options safe in this setting    Insomnia  Assessment & Plan  Continue home Seroquel 100 mg nightly.    Anxiety  Assessment & Plan  Continue home paroxetine 30 mg daily      Core Measures:  Fluids: PO clear liquids    Lines: PIV  Abx: None  Diet: Clear Liquids  PPX: SCDs/TEDs; held in the setting of possible upper GI bleed.  CODE Status: DNAR/DNI    Dispo: Inpatient.    Yoselin Peter MD  [facility]   "

## 2024-02-28 NOTE — CARE PLAN
The patient is Stable - Low risk of patient condition declining or worsening    Shift Goals  Clinical Goals: Patient pain will decrease to 5/10 pain this shift  Patient Goals: pain and rest  Family Goals: none present    Progress made toward(s) clinical / shift goals:    Problem: Knowledge Deficit - Standard  Goal: Patient and family/care givers will demonstrate understanding of plan of care, disease process/condition, diagnostic tests and medications  Description: Target End Date:  1-3 days or as soon as patient condition allows    Document in Patient Education    1.  Patient and family/caregiver oriented to unit, equipment, visitation policy and means for communicating concern  2.  Complete/review Learning Assessment  3.  Assess knowledge level of disease process/condition, treatment plan, diagnostic tests and medications  4.  Explain disease process/condition, treatment plan, diagnostic tests and medications  Outcome: Progressing  Note: Pt Alert and oriented. Understands the need of the hospital stay.         Patient is not progressing towards the following goals:      Problem: Pain - Standard  Goal: Alleviation of pain or a reduction in pain to the patient’s comfort goal  Description: Target End Date:  Prior to discharge or change in level of care    Document on Vitals flowsheet    1.  Document pain using the appropriate pain scale per order or unit policy  2.  Educate and implement non-pharmacologic comfort measures (i.e. relaxation, distraction, massage, cold/heat therapy, etc.)  3.  Pain management medications as ordered  4.  Reassess pain after pain med administration per policy  5.  If opiods administered assess patient's response to pain medication is appropriate per POSS sedation scale  6.  Follow pain management plan developed in collaboration with patient and interdisciplinary team (including palliative care or pain specialists if applicable)  Outcome: Not Progressing  Note: 8/10 Pain this shift.  Patient maintained 8 pain through out the shift, IV pain medication given for enema

## 2024-02-28 NOTE — ED NOTES
Bedside report from LEANNE Tracy    ABCs intact. A+Ox4. Skin pale, warm, dry. Vitals/tele on the monitor.    Pt does not require O2 at this time. Fall precautions in place.

## 2024-02-29 VITALS
HEIGHT: 63 IN | TEMPERATURE: 98.6 F | OXYGEN SATURATION: 93 % | DIASTOLIC BLOOD PRESSURE: 69 MMHG | RESPIRATION RATE: 16 BRPM | SYSTOLIC BLOOD PRESSURE: 109 MMHG | BODY MASS INDEX: 16.91 KG/M2 | HEART RATE: 78 BPM | WEIGHT: 95.46 LBS

## 2024-02-29 LAB
ALBUMIN SERPL BCP-MCNC: 3.2 G/DL (ref 3.2–4.9)
ALBUMIN/GLOB SERPL: 0.8 G/DL
ALP SERPL-CCNC: 129 U/L (ref 30–99)
ALT SERPL-CCNC: 5 U/L (ref 2–50)
ANION GAP SERPL CALC-SCNC: 12 MMOL/L (ref 7–16)
APPEARANCE UR: CLEAR
AST SERPL-CCNC: 7 U/L (ref 12–45)
BACTERIA #/AREA URNS HPF: NEGATIVE /HPF
BASOPHILS # BLD AUTO: 1.1 % (ref 0–1.8)
BASOPHILS # BLD: 0.08 K/UL (ref 0–0.12)
BILIRUB SERPL-MCNC: 0.2 MG/DL (ref 0.1–1.5)
BILIRUB UR QL STRIP.AUTO: NEGATIVE
BUN SERPL-MCNC: 10 MG/DL (ref 8–22)
CALCIUM ALBUM COR SERPL-MCNC: 9 MG/DL (ref 8.5–10.5)
CALCIUM SERPL-MCNC: 8.4 MG/DL (ref 8.5–10.5)
CHLORIDE SERPL-SCNC: 110 MMOL/L (ref 96–112)
CO2 SERPL-SCNC: 12 MMOL/L (ref 20–33)
COLOR UR: YELLOW
CREAT SERPL-MCNC: 0.62 MG/DL (ref 0.5–1.4)
EOSINOPHIL # BLD AUTO: 0.17 K/UL (ref 0–0.51)
EOSINOPHIL NFR BLD: 2.3 % (ref 0–6.9)
EPI CELLS #/AREA URNS HPF: NEGATIVE /HPF
ERYTHROCYTE [DISTWIDTH] IN BLOOD BY AUTOMATED COUNT: 52 FL (ref 35.9–50)
GFR SERPLBLD CREATININE-BSD FMLA CKD-EPI: 107 ML/MIN/1.73 M 2
GLOBULIN SER CALC-MCNC: 3.9 G/DL (ref 1.9–3.5)
GLUCOSE SERPL-MCNC: 94 MG/DL (ref 65–99)
GLUCOSE UR STRIP.AUTO-MCNC: NEGATIVE MG/DL
HCT VFR BLD AUTO: 29.9 % (ref 37–47)
HGB BLD-MCNC: 9.9 G/DL (ref 12–16)
HYALINE CASTS #/AREA URNS LPF: NORMAL /LPF
IMM GRANULOCYTES # BLD AUTO: 0.04 K/UL (ref 0–0.11)
IMM GRANULOCYTES NFR BLD AUTO: 0.5 % (ref 0–0.9)
KETONES UR STRIP.AUTO-MCNC: NEGATIVE MG/DL
LEUKOCYTE ESTERASE UR QL STRIP.AUTO: NEGATIVE
LYMPHOCYTES # BLD AUTO: 1.26 K/UL (ref 1–4.8)
LYMPHOCYTES NFR BLD: 16.8 % (ref 22–41)
MCH RBC QN AUTO: 31.3 PG (ref 27–33)
MCHC RBC AUTO-ENTMCNC: 33.1 G/DL (ref 32.2–35.5)
MCV RBC AUTO: 94.6 FL (ref 81.4–97.8)
MICRO URNS: ABNORMAL
MONOCYTES # BLD AUTO: 0.64 K/UL (ref 0–0.85)
MONOCYTES NFR BLD AUTO: 8.5 % (ref 0–13.4)
NEUTROPHILS # BLD AUTO: 5.33 K/UL (ref 1.82–7.42)
NEUTROPHILS NFR BLD: 70.8 % (ref 44–72)
NITRITE UR QL STRIP.AUTO: NEGATIVE
NRBC # BLD AUTO: 0 K/UL
NRBC BLD-RTO: 0 /100 WBC (ref 0–0.2)
PH UR STRIP.AUTO: 7 [PH] (ref 5–8)
PLATELET # BLD AUTO: 479 K/UL (ref 164–446)
PMV BLD AUTO: 9.6 FL (ref 9–12.9)
POTASSIUM SERPL-SCNC: 3.8 MMOL/L (ref 3.6–5.5)
PROT SERPL-MCNC: 7.1 G/DL (ref 6–8.2)
PROT UR QL STRIP: 30 MG/DL
RBC # BLD AUTO: 3.16 M/UL (ref 4.2–5.4)
RBC # URNS HPF: NORMAL /HPF
RBC UR QL AUTO: NEGATIVE
SODIUM SERPL-SCNC: 134 MMOL/L (ref 135–145)
SP GR UR STRIP.AUTO: 1.01
UROBILINOGEN UR STRIP.AUTO-MCNC: 0.2 MG/DL
WBC # BLD AUTO: 7.5 K/UL (ref 4.8–10.8)
WBC #/AREA URNS HPF: NORMAL /HPF

## 2024-02-29 PROCEDURE — 700111 HCHG RX REV CODE 636 W/ 250 OVERRIDE (IP): Mod: UD

## 2024-02-29 PROCEDURE — 99238 HOSP IP/OBS DSCHRG MGMT 30/<: CPT | Mod: GC | Performed by: FAMILY MEDICINE

## 2024-02-29 PROCEDURE — 700105 HCHG RX REV CODE 258: Mod: UD

## 2024-02-29 PROCEDURE — 700102 HCHG RX REV CODE 250 W/ 637 OVERRIDE(OP): Mod: UD

## 2024-02-29 PROCEDURE — G0378 HOSPITAL OBSERVATION PER HR: HCPCS

## 2024-02-29 PROCEDURE — 700102 HCHG RX REV CODE 250 W/ 637 OVERRIDE(OP)

## 2024-02-29 PROCEDURE — 96376 TX/PRO/DX INJ SAME DRUG ADON: CPT

## 2024-02-29 PROCEDURE — 80053 COMPREHEN METABOLIC PANEL: CPT

## 2024-02-29 PROCEDURE — A9270 NON-COVERED ITEM OR SERVICE: HCPCS | Mod: UD

## 2024-02-29 PROCEDURE — 97535 SELF CARE MNGMENT TRAINING: CPT

## 2024-02-29 PROCEDURE — 97163 PT EVAL HIGH COMPLEX 45 MIN: CPT

## 2024-02-29 PROCEDURE — 85025 COMPLETE CBC W/AUTO DIFF WBC: CPT

## 2024-02-29 PROCEDURE — A9270 NON-COVERED ITEM OR SERVICE: HCPCS

## 2024-02-29 RX ORDER — HYDROMORPHONE HYDROCHLORIDE 1 MG/ML
0.5 INJECTION, SOLUTION INTRAMUSCULAR; INTRAVENOUS; SUBCUTANEOUS ONCE
Status: COMPLETED | OUTPATIENT
Start: 2024-02-29 | End: 2024-02-29

## 2024-02-29 RX ADMIN — DOCUSATE SODIUM 50 MG AND SENNOSIDES 8.6 MG 1 TABLET: 8.6; 5 TABLET, FILM COATED ORAL at 05:06

## 2024-02-29 RX ADMIN — HYDROMORPHONE HYDROCHLORIDE 0.5 MG: 1 INJECTION, SOLUTION INTRAMUSCULAR; INTRAVENOUS; SUBCUTANEOUS at 10:37

## 2024-02-29 RX ADMIN — SODIUM CHLORIDE, POTASSIUM CHLORIDE, SODIUM LACTATE AND CALCIUM CHLORIDE: 600; 310; 30; 20 INJECTION, SOLUTION INTRAVENOUS at 05:00

## 2024-02-29 RX ADMIN — OXYCODONE AND ACETAMINOPHEN 1 TABLET: 10; 325 TABLET ORAL at 05:06

## 2024-02-29 RX ADMIN — POLYETHYLENE GLYCOL 3350 1 PACKET: 17 POWDER, FOR SOLUTION ORAL at 05:05

## 2024-02-29 RX ADMIN — OMEPRAZOLE 40 MG: 20 CAPSULE, DELAYED RELEASE ORAL at 08:42

## 2024-02-29 RX ADMIN — GABAPENTIN 200 MG: 100 CAPSULE ORAL at 05:06

## 2024-02-29 RX ADMIN — GABAPENTIN 200 MG: 100 CAPSULE ORAL at 11:41

## 2024-02-29 ASSESSMENT — PATIENT HEALTH QUESTIONNAIRE - PHQ9
4. FEELING TIRED OR HAVING LITTLE ENERGY: NOT AT ALL
2. FEELING DOWN, DEPRESSED, IRRITABLE, OR HOPELESS: NOT AT ALL
8. MOVING OR SPEAKING SO SLOWLY THAT OTHER PEOPLE COULD HAVE NOTICED. OR THE OPPOSITE, BEING SO FIGETY OR RESTLESS THAT YOU HAVE BEEN MOVING AROUND A LOT MORE THAN USUAL: NOT AT ALL
3. TROUBLE FALLING OR STAYING ASLEEP OR SLEEPING TOO MUCH: NOT AT ALL
9. THOUGHTS THAT YOU WOULD BE BETTER OFF DEAD, OR OF HURTING YOURSELF: NOT AT ALL
5. POOR APPETITE OR OVEREATING: NOT AT ALL
7. TROUBLE CONCENTRATING ON THINGS, SUCH AS READING THE NEWSPAPER OR WATCHING TELEVISION: NOT AT ALL
1. LITTLE INTEREST OR PLEASURE IN DOING THINGS: NOT AT ALL
SUM OF ALL RESPONSES TO PHQ QUESTIONS 1-9: 0
6. FEELING BAD ABOUT YOURSELF - OR THAT YOU ARE A FAILURE OR HAVE LET YOURSELF OR YOUR FAMILY DOWN: NOT AL ALL
SUM OF ALL RESPONSES TO PHQ9 QUESTIONS 1 AND 2: 0

## 2024-02-29 ASSESSMENT — GAIT ASSESSMENTS
DISTANCE (FEET): 10
GAIT LEVEL OF ASSIST: CONTACT GUARD ASSIST
ASSISTIVE DEVICE: FRONT WHEEL WALKER

## 2024-02-29 ASSESSMENT — COGNITIVE AND FUNCTIONAL STATUS - GENERAL
SUGGESTED CMS G CODE MODIFIER MOBILITY: CK
TURNING FROM BACK TO SIDE WHILE IN FLAT BAD: A LITTLE
CLIMB 3 TO 5 STEPS WITH RAILING: A LITTLE
MOVING TO AND FROM BED TO CHAIR: A LITTLE
MOVING FROM LYING ON BACK TO SITTING ON SIDE OF FLAT BED: A LITTLE
STANDING UP FROM CHAIR USING ARMS: A LITTLE
WALKING IN HOSPITAL ROOM: A LITTLE
MOBILITY SCORE: 18

## 2024-02-29 ASSESSMENT — PAIN DESCRIPTION - PAIN TYPE
TYPE: CHRONIC PAIN

## 2024-02-29 NOTE — DISCHARGE INSTRUCTIONS
Bloody Stools  Bloody stools means there is blood in your poop (stool). It is a sign that there is a problem somewhere in the digestive system. It is important for your doctor to find the cause of your bleeding, so the problem can be treated.   HOME CARE  Only take medicine as told by your doctor.  Eat foods with fiber (prunes, bran cereals).  Drink enough fluids to keep your pee (urine) clear or pale yellow.  Sit in warm water (sitz bath) for 10 to 15 minutes as told by your doctor.  Know how to take your medicines (enemas, suppositories) if advised by your doctor.  Watch for signs that you are getting better or getting worse.  GET HELP RIGHT AWAY IF:   You are not getting better.  You start to get better but then get worse again.  You have new problems.  You have severe bleeding from the place where poop comes out (rectum) that does not stop.  You throw up (vomit) blood.  You feel weak or pass out (faint).  You have a fever.  MAKE SURE YOU:   Understand these instructions.  Will watch your condition.  Will get help right away if you are not doing well or get worse.  Document Released: 12/06/2010 Document Revised: 03/11/2013 Document Reviewed: 05/04/2012  [employer]® Patient Information ©2014 [employer].

## 2024-02-29 NOTE — CARE PLAN
The patient is Stable - Low risk of patient condition declining or worsening    Shift Goals  Clinical Goals: Pain management  Patient Goals: Rest  Family Goals: NAIN    Progress made toward(s) clinical / shift goals:      Patient is not progressing towards the following goals:      Problem: Pain - Standard  Goal: Alleviation of pain or a reduction in pain to the patient’s comfort goal  Description: Target End Date:  Prior to discharge or change in level of care    Document on Vitals flowsheet    1.  Document pain using the appropriate pain scale per order or unit policy  2.  Educate and implement non-pharmacologic comfort measures (i.e. relaxation, distraction, massage, cold/heat therapy, etc.)  3.  Pain management medications as ordered  4.  Reassess pain after pain med administration per policy  5.  If opiods administered assess patient's response to pain medication is appropriate per POSS sedation scale  6.  Follow pain management plan developed in collaboration with patient and interdisciplinary team (including palliative care or pain specialists if applicable)  Outcome: Not Progressing

## 2024-02-29 NOTE — DISCHARGE PLANNING
Pt rolled back to observation/outpatient status per attending MD determination Dr. Ignacio Johansen and UR Committee MD secondary review Dr. Doroteo Gamez. Condition code 44 completed.

## 2024-02-29 NOTE — DISCHARGE SUMMARY
"Admit Date:  2/27/2024       Discharge Date:   2/29/2024    Service:   [facility] Inpatient Team  Attending Physician(s):   Ignacio Johansen M.D.       Senior Resident(s):   Stormy Gotti M.D.    Primary Diagnosis:   Abdominal pain  Constipation  Metabolic acidosis with normal anion gap and bicarbonate losses    Secondary Diagnoses:                Chronic pain  Severe protein calorie malnutrition  Insomnia  GERD  Chronic pain syndrome  Anxiety    HPI (Per Dr. Peter's Admission H&P):     \"Mary Martinez is a 53 y.o. female (appears older than documented age) with past medical history significant for GERD (on omeprazole), anxiety/depression (on paroxetine), insomnia (on quetiapine), extensive GI history including ulcers/bleeding/small bowel obstruction/hiatal hernia/multiple EGDs/colonoscopies, Rheumatoid Arthritis leading to right transmetacarpal amputation of right hand (sparing thumb, following multiple corrective hand surgeries) and chronic pain syndrome (on Oxycodone/Acetaminophen 10-325mg) who presented with complaints of abdominal pain with hard dark stools \"the size of golf balls that hurt like hell to get out and I haven't pooped since I was discharged on the 11th or whatever\".  \"My stools were dark but I did not see any blood\".  States some associated nausea and episodes of vomiting that she states is her normal. She states her abdominal discomfort is different this time, and that she hasn't felt this pain before. She states her pain is usually epigastric and in lower abdomen, as it is today. No other complaints were reported during history taking this evening.     During previous hospitalization patient was not seen by GI, however, prior recent admission on 2/7/2024 patient was seen by GI. Of note, per chart review, patient is well known to [facility] team and has had multiple abdominal exams (SBFT and CT scan in 2022) that showed no evidence SMA syndrome, as mentioned in previous charts. I updated " "patient with this information during today's admission.\"    Hospital Summary (Brief Narrative):       Mary Martinez was admitted to [facility] on 2/27/2024 for management of abdominal pain.  On admission, patient had stable vital signs and reassuring labs which were notable for chronic anemia with Hgb 10.7, CBC otherwise unremarkable CMP notable for metabolic acidosis with normal anion gap and elevated alkaline phosphatase (chronic) otherwise unremarkable.  KUB on admission was notable for bowel gas pattern with moderate to large colonic stool burden.  Given the patient's chronic opioid use, constipation was determined to be the likely cause of her abdominal pain.  The patient was eventually able to have a bowel movement after receiving multiple enemas and ultimately requiring digital disimpaction.  Immediately after relieving the stool burden, the patient's abdominal symptoms resolved.  Patient was then medically cleared for discharge.  Of note, the patient's workup for nonanion gap metabolic acidosis was consistent with RTA and a referral has been placed for outpatient follow-up with nephrology.    Consultants:      None    Procedures:        None    Labs:  Results for orders placed or performed during the hospital encounter of 02/27/24   COD (ADULT)   Result Value Ref Range    ABO Grouping Only O     Rh Grouping Only NEG     Antibody Screen-Cod NEG    CBC WITH DIFFERENTIAL   Result Value Ref Range    WBC 7.1 4.8 - 10.8 K/uL    RBC 3.36 (L) 4.20 - 5.40 M/uL    Hemoglobin 10.7 (L) 12.0 - 16.0 g/dL    Hematocrit 32.7 (L) 37.0 - 47.0 %    MCV 97.3 81.4 - 97.8 fL    MCH 31.8 27.0 - 33.0 pg    MCHC 32.7 32.2 - 35.5 g/dL    RDW 55.2 (H) 35.9 - 50.0 fL    Platelet Count 459 (H) 164 - 446 K/uL    MPV 9.2 9.0 - 12.9 fL    Neutrophils-Polys 66.80 44.00 - 72.00 %    Lymphocytes 20.30 (L) 22.00 - 41.00 %    Monocytes 9.60 0.00 - 13.40 %    Eosinophils 1.80 0.00 - 6.90 %    Basophils 0.70 0.00 - 1.80 %    Immature Granulocytes 0.80 " 0.00 - 0.90 %    Nucleated RBC 0.00 0.00 - 0.20 /100 WBC    Neutrophils (Absolute) 4.72 1.82 - 7.42 K/uL    Lymphs (Absolute) 1.44 1.00 - 4.80 K/uL    Monos (Absolute) 0.68 0.00 - 0.85 K/uL    Eos (Absolute) 0.13 0.00 - 0.51 K/uL    Baso (Absolute) 0.05 0.00 - 0.12 K/uL    Immature Granulocytes (abs) 0.06 0.00 - 0.11 K/uL    NRBC (Absolute) 0.00 K/uL   COMP METABOLIC PANEL   Result Value Ref Range    Sodium 135 135 - 145 mmol/L    Potassium 3.8 3.6 - 5.5 mmol/L    Chloride 112 96 - 112 mmol/L    Co2 9 (LL) 20 - 33 mmol/L    Anion Gap 14.0 7.0 - 16.0    Glucose 87 65 - 99 mg/dL    Bun 17 8 - 22 mg/dL    Creatinine 0.76 0.50 - 1.40 mg/dL    Calcium 8.7 8.5 - 10.5 mg/dL    Correct Calcium 9.3 8.5 - 10.5 mg/dL    AST(SGOT) 12 12 - 45 U/L    ALT(SGPT) 9 2 - 50 U/L    Alkaline Phosphatase 131 (H) 30 - 99 U/L    Total Bilirubin <0.2 0.1 - 1.5 mg/dL    Albumin 3.2 3.2 - 4.9 g/dL    Total Protein 7.5 6.0 - 8.2 g/dL    Globulin 4.3 (H) 1.9 - 3.5 g/dL    A-G Ratio 0.7 g/dL   LIPASE   Result Value Ref Range    Lipase 40 11 - 82 U/L   PROTHROMBIN TIME   Result Value Ref Range    PT 15.6 (H) 12.0 - 14.6 sec    INR 1.23 (H) 0.87 - 1.13   APTT   Result Value Ref Range    APTT 31.5 24.7 - 36.0 sec   ESTIMATED GFR   Result Value Ref Range    GFR (CKD-EPI) 94 >60 mL/min/1.73 m 2   MAGNESIUM   Result Value Ref Range    Magnesium 2.2 1.5 - 2.5 mg/dL   CBC with Differential   Result Value Ref Range    WBC 7.2 4.8 - 10.8 K/uL    RBC 3.16 (L) 4.20 - 5.40 M/uL    Hemoglobin 9.9 (L) 12.0 - 16.0 g/dL    Hematocrit 30.6 (L) 37.0 - 47.0 %    MCV 96.8 81.4 - 97.8 fL    MCH 31.3 27.0 - 33.0 pg    MCHC 32.4 32.2 - 35.5 g/dL    RDW 55.1 (H) 35.9 - 50.0 fL    Platelet Count 449 (H) 164 - 446 K/uL    MPV 9.4 9.0 - 12.9 fL    Neutrophils-Polys 69.40 44.00 - 72.00 %    Lymphocytes 18.70 (L) 22.00 - 41.00 %    Monocytes 8.90 0.00 - 13.40 %    Eosinophils 1.70 0.00 - 6.90 %    Basophils 0.60 0.00 - 1.80 %    Immature Granulocytes 0.70 0.00 - 0.90 %     Nucleated RBC 0.00 0.00 - 0.20 /100 WBC    Neutrophils (Absolute) 5.01 1.82 - 7.42 K/uL    Lymphs (Absolute) 1.35 1.00 - 4.80 K/uL    Monos (Absolute) 0.64 0.00 - 0.85 K/uL    Eos (Absolute) 0.12 0.00 - 0.51 K/uL    Baso (Absolute) 0.04 0.00 - 0.12 K/uL    Immature Granulocytes (abs) 0.05 0.00 - 0.11 K/uL    NRBC (Absolute) 0.00 K/uL   Comp Metabolic Panel (CMP)   Result Value Ref Range    Sodium 135 135 - 145 mmol/L    Potassium 3.4 (L) 3.6 - 5.5 mmol/L    Chloride 112 96 - 112 mmol/L    Co2 10 (L) 20 - 33 mmol/L    Anion Gap 13.0 7.0 - 16.0    Glucose 98 65 - 99 mg/dL    Bun 13 8 - 22 mg/dL    Creatinine 0.57 0.50 - 1.40 mg/dL    Calcium 8.4 (L) 8.5 - 10.5 mg/dL    Correct Calcium 9.0 8.5 - 10.5 mg/dL    AST(SGOT) 11 (L) 12 - 45 U/L    ALT(SGPT) 10 2 - 50 U/L    Alkaline Phosphatase 129 (H) 30 - 99 U/L    Total Bilirubin 0.3 0.1 - 1.5 mg/dL    Albumin 3.2 3.2 - 4.9 g/dL    Total Protein 7.0 6.0 - 8.2 g/dL    Globulin 3.8 (H) 1.9 - 3.5 g/dL    A-G Ratio 0.8 g/dL   ESTIMATED GFR   Result Value Ref Range    GFR (CKD-EPI) 109 >60 mL/min/1.73 m 2   LACTIC ACID   Result Value Ref Range    Lactic Acid 0.8 0.5 - 2.0 mmol/L   LACTIC ACID   Result Value Ref Range    Lactic Acid 0.7 0.5 - 2.0 mmol/L   ABG - LAB   Result Value Ref Range    Ph 7.33 (L) 7.40 - 7.50    Pco2 21.9 (L) 26.0 - 37.0 mmHg    Po2 100.5 (H) 64.0 - 87.0 mmHg    O2 Saturation 97.2 93.0 - 99.0 %    Hco3 11 (L) 17 - 25 mmol/L    Base Excess -13 (L) -4 - 3 mmol/L    Body Temp 37.3C Centigrade    O2 Therapy room     Ph -TC 7.33 (L) 7.40 - 7.50    Pco2 -TC 22.2 (L) 26.0 - 37.0 mmHg    Po2 -.3 (H) 64.0 - 87.0 mmHg   URINE SODIUM RANDOM   Result Value Ref Range    Sodium, Urine -per volume 102 mmol/L   URINE POTASSIUM RANDOM   Result Value Ref Range    Potassium 19.0 mmol/L   URINE CHLORIDE RANDOM   Result Value Ref Range    Chloride, Urine-per volume 101 mmol/L   URINE CREATININE RANDOM   Result Value Ref Range    Creatinine, Random Urine 35.17 mg/dL    URINE PROTEIN RANDOM   Result Value Ref Range    Total Protein, Urine 26.0 (H) 0.0 - 15.0 mg/dL   URINE, PH    Result Value Ref Range    Ph 6.5 5.0 - 8.0   LACTIC ACID   Result Value Ref Range    Lactic Acid 0.7 0.5 - 2.0 mmol/L   LACTIC ACID   Result Value Ref Range    Lactic Acid 0.5 0.5 - 2.0 mmol/L   CBC WITH DIFFERENTIAL   Result Value Ref Range    WBC 7.5 4.8 - 10.8 K/uL    RBC 3.16 (L) 4.20 - 5.40 M/uL    Hemoglobin 9.9 (L) 12.0 - 16.0 g/dL    Hematocrit 29.9 (L) 37.0 - 47.0 %    MCV 94.6 81.4 - 97.8 fL    MCH 31.3 27.0 - 33.0 pg    MCHC 33.1 32.2 - 35.5 g/dL    RDW 52.0 (H) 35.9 - 50.0 fL    Platelet Count 479 (H) 164 - 446 K/uL    MPV 9.6 9.0 - 12.9 fL    Neutrophils-Polys 70.80 44.00 - 72.00 %    Lymphocytes 16.80 (L) 22.00 - 41.00 %    Monocytes 8.50 0.00 - 13.40 %    Eosinophils 2.30 0.00 - 6.90 %    Basophils 1.10 0.00 - 1.80 %    Immature Granulocytes 0.50 0.00 - 0.90 %    Nucleated RBC 0.00 0.00 - 0.20 /100 WBC    Neutrophils (Absolute) 5.33 1.82 - 7.42 K/uL    Lymphs (Absolute) 1.26 1.00 - 4.80 K/uL    Monos (Absolute) 0.64 0.00 - 0.85 K/uL    Eos (Absolute) 0.17 0.00 - 0.51 K/uL    Baso (Absolute) 0.08 0.00 - 0.12 K/uL    Immature Granulocytes (abs) 0.04 0.00 - 0.11 K/uL    NRBC (Absolute) 0.00 K/uL   Comp Metabolic Panel   Result Value Ref Range    Sodium 134 (L) 135 - 145 mmol/L    Potassium 3.8 3.6 - 5.5 mmol/L    Chloride 110 96 - 112 mmol/L    Co2 12 (L) 20 - 33 mmol/L    Anion Gap 12.0 7.0 - 16.0    Glucose 94 65 - 99 mg/dL    Bun 10 8 - 22 mg/dL    Creatinine 0.62 0.50 - 1.40 mg/dL    Calcium 8.4 (L) 8.5 - 10.5 mg/dL    Correct Calcium 9.0 8.5 - 10.5 mg/dL    AST(SGOT) 7 (L) 12 - 45 U/L    ALT(SGPT) 5 2 - 50 U/L    Alkaline Phosphatase 129 (H) 30 - 99 U/L    Total Bilirubin 0.2 0.1 - 1.5 mg/dL    Albumin 3.2 3.2 - 4.9 g/dL    Total Protein 7.1 6.0 - 8.2 g/dL    Globulin 3.9 (H) 1.9 - 3.5 g/dL    A-G Ratio 0.8 g/dL   ESTIMATED GFR   Result Value Ref Range    GFR (CKD-EPI) 107 >60 mL/min/1.73  m 2       Imaging/ Testing:      [id number] VIEW   Final Result      Normal bowel gas pattern with a moderate to large colonic stool burden.      DX-CHEST-PORTABLE (1 VIEW)   Final Result      No acute cardiac or pulmonary abnormalities are identified.          Physical Exam:  Constitutional: Cachectic, appears older than stated age, pleasant  Skin: Warm, dry, good turgor, no rashes in visible areas.  HEENT: Normocephalic, atraumatic, EOMI, moist mucous membranes, neck supple  Cardiovascular: Regular rate and rhythm, no murmurs rubs or gallops  Respiratory: Unlabored respiratory effort, no cough.  Abdomen: Soft, nontender, nondistended, scaphoid  MSK: Moves all extremities.  Neuro: Grossly normal  Psych: Normal affect and mood.    Discharge Medications:           Medication List        CHANGE how you take these medications        Instructions   PARoxetine 30 MG Tabs  What changed: See the new instructions.  Commonly known as: Paxil   TAKE 2 TABLETS(60 MG) BY MOUTH EVERY EVENING            CONTINUE taking these medications        Instructions   acetaminophen 325 MG Tabs  Commonly known as: Tylenol   Take 325-650 mg by mouth every 6 hours as needed. Indications: Pain  Dose: 325-650 mg     metoclopramide 10 MG Tabs  Commonly known as: Reglan   Doctor's comments: Please substitute with an available or covered quantity or equivalent alternative if necessary.  Take 1 Tablet by mouth 4 times a day as needed (nausea and vomitng).  Dose: 10 mg     omeprazole 40 MG delayed-release capsule  Commonly known as: PriLOSEC   Take 40 mg by mouth 2 times a day.  Dose: 40 mg     ondansetron 4 MG Tbdp  Commonly known as: Zofran ODT   Doctor's comments: Please substitute with an available or covered quantity or equivalent alternative if necessary.  Take 1 Tablet by mouth every 6 hours as needed for Nausea/Vomiting.  Dose: 4 mg     * oxyCODONE-acetaminophen  MG Tabs  Commonly known as: Percocet-10   Doctor's comments: Please  substitute with an available or covered quantity or equivalent alternative if necessary.  Take 1 Tablet by mouth every four hours as needed for Severe Pain for up to 30 days. Indications: Pain  Dose: 1 Tablet     * oxyCODONE-acetaminophen  MG Tabs  Start taking on: March 18, 2024  Commonly known as: Percocet   Doctor's comments: Please substitute with an available or covered quantity or equivalent alternative if necessary.  Take 1 Tablet by mouth every four hours as needed for Severe Pain for up to 30 days. Indications: Pain  Dose: 1 Tablet     * oxyCODONE-acetaminophen  MG Tabs  Start taking on: April 18, 2024  Commonly known as: Percocet-10   Doctor's comments: Please substitute with an available or covered quantity or equivalent alternative if necessary.  Take 1 Tablet by mouth every four hours as needed for Severe Pain for up to 30 days. Indications: Pain  Dose: 1 Tablet     QUEtiapine 100 MG Tabs  Commonly known as: SEROquel   Take 1 Tablet by mouth every evening.  Dose: 100 mg           * This list has 3 medication(s) that are the same as other medications prescribed for you. Read the directions carefully, and ask your doctor or other care provider to review them with you.                    Disposition:   Discharge home    Diet:   High protein    Activity:   Ad beata. as tolerated    Instructions:         The patient was instructed to return to the ER in the event of worsening symptoms. I have counseled the patient on the importance of compliance and the patient has agreed to proceed with all medical recommendations and follow up plan indicated above.   The patient understands that all medications come with benefits and risks. Risks may include permanent injury or death and these risks can be minimized with close reassessment and monitoring.        Please CC: Stormy Gotti M.D.    Follow up appointment details :        -Referral to nephrology placed for outpatient follow-up of suspected  RTA    Follow up with Primary Care Physician within 7 days of discharge for further evaluation and care     Pending Studies:        None      (1) The patient recovered much more quickly than anticipated on admission and is currently suitable for discharge.

## 2024-02-29 NOTE — THERAPY
"Physical Therapy   Initial Evaluation     Patient Name: Mary Martinez  Age:  53 y.o., Sex:  female  Medical Record #: [id number]  Today's Date: 2/29/2024       Precautions: Fall Risk    Assessment  Patient is 53 y.o. female who presented with c/o abdominal pain with hard dark stools the size of golf balls that hurt like hell to get out...\" Additional factors influencing patient status / progress include a GERD, anxiety/depression, insomnia, rheumatoid arthritis leading to right transmetacarpal amputation of right hand, and an extensive GI history including ulcers/bleeding/small bowel obstruction/hiatal hernia/multiple EGDs/colonoscopies.    Patient received supine agreeable to evaluation. Presents near to baseline with all functional mobility including bed mobility, transfers, and short ambulation with FWW. Patient has a more unique and personal way of mobilizing, but is fairly stable, and what patient describes as her \"normal.\" Patient lives with adult children who assist as needed.    No further acute PT services are needed while admitted. Recommend discharge home with HHPT services once medically cleared. Patient reports wanting HHPT services before but fell through.      Plan    Physical Therapy Initial Treatment Plan   Treatment Plan : Bed Mobility, Equipment, Gait Training, Manual Therapy, Neuro Re-Education / Balance, Self Care / Home Evaluation, Stair Training, Therapeutic Activities, Therapeutic Exercise  Treatment Frequency: 3 Times per Week  Duration: (P) Evaluation only    DC Equipment Recommendations: (P) None  Discharge Recommendations: Recommend home health for continued physical therapy services       Subjective    \"Can I go home now?\"     Objective       02/29/24 1006   Initial Contact Note    Initial Contact Note Order Received and Verified, Evaluation Only - Patient Does Not Require Further Acute Physical Therapy at this Time.  However, May Benefit from Post Acute Therapy for Higher Level " "Functional Deficits.   Precautions   Precautions Fall Risk   Vitals   O2 (LPM) 0   O2 Delivery Device None - Room Air   Pain   Intervention Medication (see MAR)   Pain 0 - 10 Group   Location Leg   Location Orientation Right;Left   Description Aching;Constant   Comfort Goal Comfort with Movement;Perform Activity   Therapist Pain Assessment Prior to Activity;During Activity;Post Activity;Nurse Notified   Non Verbal Descriptors   Non Verbal Scale  Calm   Prior Living Situation   Prior Services Intermittent Physical Support for ADL Per Family   Housing / Facility 1 Story House   Steps Into Home 1   Steps In Home 0   Bathroom Set up Bathtub / Shower Combination;Grab Bars  (Reports she sits on edge of tub for showers)   Equipment Owned 4-Wheel Walker;Single Point Cane;Grab Bar(s) In Tub / Shower;Raised Toilet Seat Without Arms   Lives with - Patient's Self Care Capacity Adult Children;Child Less than 18 Years of Age   Comments Pt's kids assist as needed. Reports 1 is always home/available.   Prior Level of Functional Mobility   Bed Mobility Independent   Transfer Status Independent   Ambulation Independent   Ambulation Distance short household   Assistive Devices Used 4-Wheel Walker   Stairs Required Assist   Comments AD dependent on fatigue/time of day/\"How she's feeling\" - prefers 4WW due to decreased ability to  with R hand   History of Falls   History of Falls Yes   Date of Last Fall   (\"6 months ago\")   Cognition    Cognition / Consciousness WDL   Level of Consciousness Alert   Comments pleasnat, cooperative, motivated   Strength Upper Body   Upper Body Strength  X   Gross Strength Generalized Weakness, Equal Bilaterally.    Comments R hand limited by amputations   Passive ROM Lower Body   Passive ROM Lower Body WDL   Active ROM Lower Body    Active ROM Lower Body  X   Comments limited by pain, toes limited/fused   Strength Lower Body   Lower Body Strength  X   Comments limited by pain, generalized weakness " "bilaterally   Sensation Lower Body   Lower Extremity Sensation   WDL   Lower Body Muscle Tone   Lower Body Muscle Tone  WDL   Coordination Lower Body    Coordination Lower Body  WDL   Other Treatments   Other Treatments Provided Patient education regarding POC, discharge planning, and benefits of continued/frequent/daily OOB mobility to maintain functional mobility   Balance Assessment   Sitting Balance (Static) Good   Sitting Balance (Dynamic) Fair +   Standing Balance (Static) Fair   Standing Balance (Dynamic) Fair   Weight Shift Sitting Good   Weight Shift Standing Fair   Comments w/ FWW   Bed Mobility    Supine to Sit Supervised   Sit to Supine Supervised   Scooting Supervised   Rolling Supervised   Comments HOB flat   Gait Analysis   Gait Level Of Assist Contact Guard Assist   Assistive Device Front Wheel Walker   Distance (Feet) 20   # of Times Distance was Traveled 1   Deviation Antalgic;Decreased Base Of Support;Step To;Shuffled Gait;Decreased Heel Strike;Decreased Toe Off  (significant bentover posture)   # of Stairs Climbed 0   Weight Bearing Status no restrictions   Comments CGA only due to patient's \"unique\" way of mobilizing and display of significant bentover posture that patient reports is normal. w/ FWW. Promotes \"It looks funny but I have my own way\"   Functional Mobility   Sit to Stand Contact Guard Assist   Mobility supine > EOB > short ambulation > EOB   Comments CGA only due to patient's \"unique\" way of mobilizing and display of significant bentover posture that patient reports is normal. w/ FWW. Endorses 4WW is better due to decraesed  on R hand and 4WW being able to push more easily.   6 Clicks Assessment - How much HELP from from another person do you currently need... (If the patient hasn't done an activity recently, how much help from another person do you think he/she would need if he/she tried?)   Turning from your back to your side while in a flat bed without using bedrails? 3 "   Moving from lying on your back to sitting on the side of a flat bed without using bedrails? 3   Moving to and from a bed to a chair (including a wheelchair)? 3   Standing up from a chair using your arms (e.g., wheelchair, or bedside chair)? 3   Walking in hospital room? 3   Climbing 3-5 steps with a railing? 3   6 clicks Mobility Score 18   Activity Tolerance   Sitting Edge of Bed EOB post session   Standing 1-2 minutes   Comments w/ FWW   Education Group   Education Provided Role of Physical Therapist;Use of Assistive Device   Role of Physical Therapist Patient Response Patient;Acceptance;Explanation;Verbal Demonstration   Gait Training Patient Response Patient;Acceptance;Explanation;Reinforcement Needed   Additional Comments receptive, cooperative, motivated   Physical Therapy Initial Treatment Plan    Duration Evaluation only   Anticipated Discharge Equipment and Recommendations   DC Equipment Recommendations None   Interdisciplinary Plan of Care Collaboration   IDT Collaboration with  Nursing   Patient Position at End of Therapy Seated;Edge of Bed;Call Light within Reach;Tray Table within Reach   Collaboration Comments RN updated   Session Information   Date / Session Number  2/29 - EVAL ONLY

## 2024-03-01 NOTE — THERAPY
"Physical Therapy   Initial Evaluation     Patient Name: Mary Martinez  Age:  53 y.o., Sex:  female  Medical Record #: [id number]  Today's Date: 3/1/2024     Precautions: Fall Risk    Assessment  Patient is 53 y.o. female who presented with c/o abdominal pain with hard, dark stools \"the size of golf balls that hurt like hell to get out...\" Additional factors influencing patient status/progress include GERD, anxiety/depression, insomnia, rheumatoid arthritis leading to R transmetacarpal amputation of right hand, and an extensive GI history including ulcers/bleeding/small bowel obstruction/hiatal hernia/multiple EGDs/colonoscopies.     Patient received supine agreeable to evaluation. Presents near to baseline with all functional mobility including bed mobility, transfers, and short ambulation with FWW. Patient has a more unique and personal way of mobilizing, but is fairly stable, and what patient describes as her \"normal.\" Patient lives with adult children who assist as needed.    No further acute PT services are needed while admitted. Recommend discharge home with HHPT services once medically cleared. Patient reports wanting HHPT services before but fell through.     Plan    Physical Therapy Initial Treatment Plan   Duration: Evaluation only    DC Equipment Recommendations: None  Discharge Recommendations: Recommend home health for continued physical therapy services       Subjective    \"Can I go home now?\"     Objective       02/29/24 1006   Initial Contact Note    Initial Contact Note Order Received and Verified, Evaluation Only - Patient Does Not Require Further Acute Physical Therapy at this Time.  However, May Benefit from Post Acute Therapy for Higher Level Functional Deficits.   Precautions   Precautions Fall Risk   Vitals   O2 (LPM) 0   O2 Delivery Device None - Room Air   Pain   Intervention Medication (see MAR)   Pain 0 - 10 Group   Location Leg   Location Orientation Right;Left   Description " "Aching;Constant   Comfort Goal Comfort with Movement;Perform Activity   Therapist Pain Assessment Prior to Activity;During Activity;Post Activity;Nurse Notified   Non Verbal Descriptors   Non Verbal Scale  Calm   Prior Living Situation   Prior Services Intermittent Physical Support for ADL Per Family   Housing / Facility 1 Story House   Steps Into Home 1   Steps In Home 0   Bathroom Set up Bathtub / Shower Combination  (Reports she sits on edge of tub for showers and will call for assist from children if needed at the end)   Equipment Owned 4-Wheel Walker;Single Point Cane;Grab Bar(s) In Tub / Shower;Raised Toilet Seat Without Arms   Lives with - Patient's Self Care Capacity Adult Children;Child Less than 18 Years of Age   Comments Pt's kids assist as needed. Reports 1 is always home/available   Prior Level of Functional Mobility   Bed Mobility Independent   Transfer Status Independent   Ambulation Independent   Ambulation Distance short household   Assistive Devices Used 4-Wheel Walker   Stairs Required Assist   Comments AD use dependent on fatigue/time of day/\"how she is feeling\" - prefers to use 4WW due to decreased ability to  a FWW and push   History of Falls   History of Falls Yes   Date of Last Fall   (\"6 months ago\")   Cognition    Cognition / Consciousness WDL   Level of Consciousness Alert   Comments pleasant   Strength Upper Body   Upper Body Strength  X   Gross Strength Generalized Weakness, Equal Bilaterally.    Comments R hand limited by amputations   Passive ROM Lower Body   Passive ROM Lower Body WDL   Active ROM Lower Body    Active ROM Lower Body  X   Comments limited by pain, tose limited/fused   Strength Lower Body   Lower Body Strength  X   Comments limited by pain, generalized weakness bilterally   Sensation Lower Body   Lower Extremity Sensation   WDL   Lower Body Muscle Tone   Lower Body Muscle Tone  WDL   Coordination Lower Body    Coordination Lower Body  WDL   Other Treatments   Other " "Treatments Provided Patient education regardgin POC, discharge planning, and benefits of continued/frequent/daily OOB mobility to maintain function mobility status   Balance Assessment   Sitting Balance (Static) Good   Sitting Balance (Dynamic) Fair +   Standing Balance (Static) Fair   Standing Balance (Dynamic) Fair   Weight Shift Sitting Good   Weight Shift Standing Fair   Comments w/ FWW   Bed Mobility    Supine to Sit Supervised   Sit to Supine Supervised   Scooting Supervised   Rolling Supervised   Comments HOB flat   Gait Analysis   Gait Level Of Assist Contact Guard Assist   Assistive Device Front Wheel Walker   Distance (Feet) 10   # of Times Distance was Traveled 2   Deviation Antalgic;Decreased Base Of Support;Step To;Shuffled Gait;Decreased Heel Strike;Decreased Toe Off   # of Stairs Climbed 0   Weight Bearing Status no restrictions   Comments CGA only due to patient's \"unique\" way of mobilizing and display of significant bentover posture that patient reports is normal. w/ FWW. Endorses 4WW is better due to decreased  on R hand to maneuver a FWW   Functional Mobility   Sit to Stand Contact Guard Assist   Mobility supine > EOB > short ambulation > EOB   Comments CGA only due to patient's \"unique\" way of mobilizing and display of significant bentover posture that patient reports is normal. w/ FWW. Endorses 4WW is better due to decreased  on R hand to maneuver a FWW   6 Clicks Assessment - How much HELP from from another person do you currently need... (If the patient hasn't done an activity recently, how much help from another person do you think he/she would need if he/she tried?)   Turning from your back to your side while in a flat bed without using bedrails? 3   Moving from lying on your back to sitting on the side of a flat bed without using bedrails? 3   Moving to and from a bed to a chair (including a wheelchair)? 3   Standing up from a chair using your arms (e.g., wheelchair, or bedside " chair)? 3   Walking in hospital room? 3   Climbing 3-5 steps with a railing? 3   6 clicks Mobility Score 18   Activity Tolerance   Sitting Edge of Bed EOB post session   Standing 1-2 minutes   Comments w/ FWW   Education Group   Education Provided Role of Physical Therapist;Use of Assistive Device   Role of Physical Therapist Patient Response Patient;Acceptance;Explanation;Verbal Demonstration   Gait Training Patient Response Patient;Acceptance;Explanation;Reinforcement Needed   Additional Comments receptice, cooperative, motivated   Physical Therapy Initial Treatment Plan    Duration Evaluation only   Anticipated Discharge Equipment and Recommendations   DC Equipment Recommendations None   Discharge Recommendations Recommend home health for continued physical therapy services   Interdisciplinary Plan of Care Collaboration   IDT Collaboration with  Nursing   Patient Position at End of Therapy Seated;Edge of Bed;Call Light within Reach;Tray Table within Reach   Collaboration Comments RN updated   Session Information   Date / Session Number  2/29 - EVAL ONLY

## 2024-03-07 ENCOUNTER — HOSPITAL ENCOUNTER (EMERGENCY)
Facility: MEDICAL CENTER | Age: 54
End: 2024-03-07
Attending: STUDENT IN AN ORGANIZED HEALTH CARE EDUCATION/TRAINING PROGRAM
Payer: MEDICAID

## 2024-03-07 ENCOUNTER — APPOINTMENT (OUTPATIENT)
Dept: MEDICAL GROUP | Facility: CLINIC | Age: 54
End: 2024-03-07
Payer: MEDICAID

## 2024-03-07 VITALS
RESPIRATION RATE: 17 BRPM | DIASTOLIC BLOOD PRESSURE: 66 MMHG | TEMPERATURE: 97.6 F | BODY MASS INDEX: 15.06 KG/M2 | HEIGHT: 63 IN | SYSTOLIC BLOOD PRESSURE: 106 MMHG | WEIGHT: 85 LBS | HEART RATE: 62 BPM | OXYGEN SATURATION: 99 %

## 2024-03-07 DIAGNOSIS — R11.2 NAUSEA AND VOMITING, UNSPECIFIED VOMITING TYPE: ICD-10-CM

## 2024-03-07 DIAGNOSIS — R10.13 EPIGASTRIC PAIN: ICD-10-CM

## 2024-03-07 LAB
ABO GROUP BLD: NORMAL
ALBUMIN SERPL BCP-MCNC: 3.8 G/DL (ref 3.2–4.9)
ALBUMIN/GLOB SERPL: 1 G/DL
ALP SERPL-CCNC: 157 U/L (ref 30–99)
ALT SERPL-CCNC: <5 U/L (ref 2–50)
ANION GAP SERPL CALC-SCNC: 15 MMOL/L (ref 7–16)
AST SERPL-CCNC: 7 U/L (ref 12–45)
BASOPHILS # BLD AUTO: 1.1 % (ref 0–1.8)
BASOPHILS # BLD: 0.08 K/UL (ref 0–0.12)
BILIRUB SERPL-MCNC: 0.2 MG/DL (ref 0.1–1.5)
BLD GP AB SCN SERPL QL: NORMAL
BUN SERPL-MCNC: 10 MG/DL (ref 8–22)
CALCIUM ALBUM COR SERPL-MCNC: 8.9 MG/DL (ref 8.5–10.5)
CALCIUM SERPL-MCNC: 8.7 MG/DL (ref 8.5–10.5)
CHLORIDE SERPL-SCNC: 114 MMOL/L (ref 96–112)
CO2 SERPL-SCNC: 12 MMOL/L (ref 20–33)
CREAT SERPL-MCNC: 0.83 MG/DL (ref 0.5–1.4)
EOSINOPHIL # BLD AUTO: 0.24 K/UL (ref 0–0.51)
EOSINOPHIL NFR BLD: 3.2 % (ref 0–6.9)
ERYTHROCYTE [DISTWIDTH] IN BLOOD BY AUTOMATED COUNT: 55.8 FL (ref 35.9–50)
GFR SERPLBLD CREATININE-BSD FMLA CKD-EPI: 85 ML/MIN/1.73 M 2
GLOBULIN SER CALC-MCNC: 4 G/DL (ref 1.9–3.5)
GLUCOSE SERPL-MCNC: 99 MG/DL (ref 65–99)
HCT VFR BLD AUTO: 34 % (ref 37–47)
HGB BLD-MCNC: 11 G/DL (ref 12–16)
IMM GRANULOCYTES # BLD AUTO: 0.05 K/UL (ref 0–0.11)
IMM GRANULOCYTES NFR BLD AUTO: 0.7 % (ref 0–0.9)
INR PPP: 1.15 (ref 0.87–1.13)
LIPASE SERPL-CCNC: 82 U/L (ref 11–82)
LYMPHOCYTES # BLD AUTO: 1.43 K/UL (ref 1–4.8)
LYMPHOCYTES NFR BLD: 19.4 % (ref 22–41)
MCH RBC QN AUTO: 31.4 PG (ref 27–33)
MCHC RBC AUTO-ENTMCNC: 32.4 G/DL (ref 32.2–35.5)
MCV RBC AUTO: 97.1 FL (ref 81.4–97.8)
MONOCYTES # BLD AUTO: 0.56 K/UL (ref 0–0.85)
MONOCYTES NFR BLD AUTO: 7.6 % (ref 0–13.4)
NEUTROPHILS # BLD AUTO: 5.03 K/UL (ref 1.82–7.42)
NEUTROPHILS NFR BLD: 68 % (ref 44–72)
NRBC # BLD AUTO: 0 K/UL
NRBC BLD-RTO: 0 /100 WBC (ref 0–0.2)
PLATELET # BLD AUTO: 512 K/UL (ref 164–446)
PMV BLD AUTO: 9.5 FL (ref 9–12.9)
POTASSIUM SERPL-SCNC: 3.3 MMOL/L (ref 3.6–5.5)
PROT SERPL-MCNC: 7.8 G/DL (ref 6–8.2)
PROTHROMBIN TIME: 14.8 SEC (ref 12–14.6)
RBC # BLD AUTO: 3.5 M/UL (ref 4.2–5.4)
RH BLD: NORMAL
SODIUM SERPL-SCNC: 141 MMOL/L (ref 135–145)
WBC # BLD AUTO: 7.4 K/UL (ref 4.8–10.8)

## 2024-03-07 PROCEDURE — 96375 TX/PRO/DX INJ NEW DRUG ADDON: CPT

## 2024-03-07 PROCEDURE — 99285 EMERGENCY DEPT VISIT HI MDM: CPT

## 2024-03-07 PROCEDURE — 86901 BLOOD TYPING SEROLOGIC RH(D): CPT

## 2024-03-07 PROCEDURE — 85610 PROTHROMBIN TIME: CPT

## 2024-03-07 PROCEDURE — 86900 BLOOD TYPING SEROLOGIC ABO: CPT

## 2024-03-07 PROCEDURE — 83690 ASSAY OF LIPASE: CPT

## 2024-03-07 PROCEDURE — 700111 HCHG RX REV CODE 636 W/ 250 OVERRIDE (IP): Mod: JZ,UD | Performed by: STUDENT IN AN ORGANIZED HEALTH CARE EDUCATION/TRAINING PROGRAM

## 2024-03-07 PROCEDURE — 86850 RBC ANTIBODY SCREEN: CPT

## 2024-03-07 PROCEDURE — 96374 THER/PROPH/DIAG INJ IV PUSH: CPT

## 2024-03-07 PROCEDURE — 85025 COMPLETE CBC W/AUTO DIFF WBC: CPT

## 2024-03-07 PROCEDURE — C9113 INJ PANTOPRAZOLE SODIUM, VIA: HCPCS | Mod: UD | Performed by: STUDENT IN AN ORGANIZED HEALTH CARE EDUCATION/TRAINING PROGRAM

## 2024-03-07 PROCEDURE — 36415 COLL VENOUS BLD VENIPUNCTURE: CPT

## 2024-03-07 PROCEDURE — 80053 COMPREHEN METABOLIC PANEL: CPT

## 2024-03-07 RX ORDER — ONDANSETRON 2 MG/ML
4 INJECTION INTRAMUSCULAR; INTRAVENOUS ONCE
Status: COMPLETED | OUTPATIENT
Start: 2024-03-07 | End: 2024-03-07

## 2024-03-07 RX ORDER — PANTOPRAZOLE SODIUM 40 MG/10ML
40 INJECTION, POWDER, LYOPHILIZED, FOR SOLUTION INTRAVENOUS ONCE
Status: COMPLETED | OUTPATIENT
Start: 2024-03-07 | End: 2024-03-07

## 2024-03-07 RX ORDER — HYDROMORPHONE HYDROCHLORIDE 1 MG/ML
0.5 INJECTION, SOLUTION INTRAMUSCULAR; INTRAVENOUS; SUBCUTANEOUS ONCE
Status: COMPLETED | OUTPATIENT
Start: 2024-03-07 | End: 2024-03-07

## 2024-03-07 RX ORDER — MORPHINE SULFATE 4 MG/ML
4 INJECTION INTRAVENOUS ONCE
Status: COMPLETED | OUTPATIENT
Start: 2024-03-07 | End: 2024-03-07

## 2024-03-07 RX ADMIN — HYDROMORPHONE HYDROCHLORIDE 0.5 MG: 1 INJECTION, SOLUTION INTRAMUSCULAR; INTRAVENOUS; SUBCUTANEOUS at 17:38

## 2024-03-07 RX ADMIN — PANTOPRAZOLE SODIUM 40 MG: 40 INJECTION, POWDER, FOR SOLUTION INTRAVENOUS at 16:38

## 2024-03-07 RX ADMIN — ONDANSETRON 4 MG: 2 INJECTION INTRAMUSCULAR; INTRAVENOUS at 16:37

## 2024-03-07 RX ADMIN — MORPHINE SULFATE 4 MG: 4 INJECTION, SOLUTION INTRAMUSCULAR; INTRAVENOUS at 16:38

## 2024-03-07 ASSESSMENT — FIBROSIS 4 INDEX: FIB4 SCORE: 0.33

## 2024-03-07 NOTE — ED TRIAGE NOTES
"Mary Martinez  53 y.o. female    Chief Complaint   Patient presents with    Abdominal Pain     Pt arrives via EMS with complaints of worsening upper abdominal pain that worsened today in severity and N/V that began today.     Pt states she was recently admitted for abdominal pain and has a hx of GI bleeds and ulcers. Pt states today she had coffee ground emesis and a dark red blood clot that appeared in toilet bowl with BM. Pt states her stool is like \"rocks\" but states she has been having a BM daily.    Given 50mcg fentanyl and 4mg zofran by medics    "

## 2024-03-08 NOTE — ED NOTES
Medicated per MAR for continued pain. Pt sipping on water. Updated on plan of care. No additional needs at this time

## 2024-03-08 NOTE — ED NOTES
Pt given discharge instructions/ home care instructions explained, pt verbalized understanding of instructions given/pt understands the importance of follow up, pt to ER lobby, to be driven home by son.

## 2024-03-08 NOTE — ED PROVIDER NOTES
CHIEF COMPLAINT  Chief Complaint   Patient presents with    Abdominal Pain       LIMITATION TO HISTORY   Select: none    HPI    Mary Martinez is a 53 y.o. female who presents to the Emergency Department evaluation of nausea vomiting for the past day.  Also reports having several darker colored stools, was concerned there may be a blood in her stool as she does have a prior history of bleeding ulcers.   Is complaining of mild epigastric and left upper quadrant abdominal pain.  Nonradiating not associate with eating or drinking or any other relieving or exacerbating factors.  States that she does have a history of constipation has been having regular bowel movements.  Denies any diarrhea.  No hematemesis.    OUTSIDE HISTORIAN(S):  Select: None    EXTERNAL RECORDS REVIEWED  Select: Other reviewed most recent discharge summary patient was admitted for abdominal pain constipation found to have a renal tubular acidosis on that visit, reviewed outside PCP note, patient does have a history of a hiatal hernia chronic epigastric pain, macrocytic anemia,      PAST MEDICAL HISTORY  Past Medical History:   Diagnosis Date    Acute renal failure (ARF) (HCC)     Acute renal failure (HCC) 10/06/2011    Allergy     Anemia     Anxiety     Arthritis     Rheumatoid -follows with rheumatologist. Has several fractures due to RA.    ASTHMA     inhalers prn    Blood transfusion without reported diagnosis     Bowel habit changes     4/24/20-constipation and diarrhea.    Bronchitis last approx 2018    Chronic pain 04/24/2020    Due to RA    Dental disorder     no teeth upper-does not wear her denture. Broken teeth on bottom.    Depression     GERD (gastroesophageal reflux disease)     GIB (gastrointestinal bleeding)     Head ache     Heart burn     taking famotidine    Hiatal hernia     Hiatus hernia syndrome     History of pancreatitis     Hypokalemia     Hyponatremia     Idiopathic chronic pancreatitis (HCC)     Indigestion      taking famotidine    Intractable nausea and vomiting     Kidney disease     Leukocytosis 10/08/2011    Pain     CPS-everywhere    Pneumonia 10/2019    PONV (postoperative nausea and vomiting)     Psychiatric problem     anxiety and depression    Rheumatoid aortitis     Rheumatoid arthritis(714.0) 2003    Severe protein-calorie malnutrition (HCC)     Small bowel obstruction (HCC) 10/06/2011    SMAS (superior mesenteric artery syndrome) (HCC)     Substance abuse (HCC)     UTI (urinary tract infection) 05/25/2023    Vitamin B12 deficiency neuropathy (HCC)      .    SURGICAL HISTORY  Past Surgical History:   Procedure Laterality Date    AL UPPER GI ENDOSCOPY,DIAGNOSIS N/A 8/16/2023    Procedure: GASTROSCOPY;  Surgeon: Blossom Peres M.D.;  Location: SURGERY SAME DAY [facility];  Service: Gastroenterology    AL PLACE PERCUT GASTROSTOMY TUBE N/A 6/12/2023    Procedure: INSERTION, PEG TUBE - PEG AND J TUBE PLACEMENT;  Surgeon: Marilyn Anderson M.D.;  Location: SURGERY SAME DAY [facility];  Service: Gastroenterology    AL UPPER GI ENDOSCOPY,DIAGNOSIS  6/12/2023    Procedure: GASTROSCOPY;  Surgeon: Marilyn Anderson M.D.;  Location: SURGERY SAME DAY [facility];  Service: Gastroenterology    AL UPPER GI ENDOSCOPY,DIAGNOSIS N/A 9/9/2022    Procedure: ENDOSCOPIC ULTRASOUND- UPPER;  Surgeon: Jorge Brooke M.D.;  Location: [facility];  Service: EUS    EGD W/ENDOSCOPIC ULTRASOUND N/A 9/9/2022    Procedure: EGD, WITH ENDOSCOPIC US;  Surgeon: Jorge Brooke M.D.;  Location: [facility];  Service: EUS    AL ENDOSCOPIC US EXAM, ESOPH  4/29/2020    Procedure: EGD, WITH ENDOSCOPIC US;  Surgeon: Jorge Brooke M.D.;  Location: [facility];  Service: Gastroenterology    GASTROSCOPY-ENDO  4/29/2020    Procedure: GASTROSCOPY;  Surgeon: Jorge Brooke M.D.;  Location: [facility];  Service: Gastroenterology    EGD WITH ASP/BX  4/29/2020    Procedure: EGD, WITH  ASPIRATION BIOPSY - POSS FNA;  Surgeon: Jorge Brooke M.D.;  Location: SURGERY [facility];  Service: Gastroenterology    NC EXPLORATORY OF ABDOMEN N/A 10/4/2019    Procedure: LAPAROTOMY, EXPLORATORY AND REPAIR OF DUODENAL PERFORATION;  Surgeon: James Dumont M.D.;  Location: [facility];  Service: General    COLONOSCOPY  2018    with upper endoscopy    FINGER ARTHROPLASTY Right 6/5/2017    Procedure: FINGER ARTHROPLASTY - LONG, RING AND SMALL VOLAR PLATE;  Surgeon: Lobo Rosen M.D.;  Location: SURGERY SAME DAY [facility];  Service:     FINGER AMPUTATION  6/5/2017    Procedure: FINGER AMPUTATION - LONG, RING AND SMALL AT THE PROXIMAL INTERPHALANGEAL JOINT;  Surgeon: Lobo Rosen M.D.;  Location: SURGERY SAME DAY [facility];  Service:     FINGER ARTHROPLASTY Right 4/17/2017    Procedure: FINGER ARTHROPLASTY ;  Surgeon: Lobo Rosen M.D.;  Location: SURGERY SAME DAY [facility];  Service:     FINGER AMPUTATION Right 9/14/2016    Procedure: FINGER AMPUTATION INDEX;  Surgeon: Lobo Rosen M.D.;  Location: SURGERY SAME DAY [facility];  Service:     IRRIGATION & DEBRIDEMENT ORTHO Right 9/11/2016    Procedure: IRRIGATION & DEBRIDEMENT ORTHO - right index finger;  Surgeon: Madhu Rosen M.D.;  Location: [facility];  Service:     FUSION, PIP JOINT, TOE Right 8/29/2016    Procedure: RE-DO INDEX FINGER PROXIMAL INTERPHALANGEAL ARTHRODESIS;  Surgeon: Lobo Rosen M.D.;  Location: SURGERY SAME DAY [facility];  Service:     BONE GRAFT Right 8/29/2016    Procedure: BONE GRAFT - DISTAL RADIUS ;  Surgeon: Lobo Rosen M.D.;  Location: SURGERY SAME DAY [facility];  Service:     ARTHRODESIS Right 5/6/2016    Procedure: ARTHRODESIS INDEX FINGER PROXIMAL INTERPHALANGEAL;  Surgeon: Lobo Rosen M.D.;  Location: SURGERY SAME DAY [facility];  Service:     FOOT RECONSTRUCTION RHEUMATIC Left 7/29/2015    Procedure: FOOT  RECONSTRUCTION RHEUMATIC;  Surgeon: Heriberto Alves M.D.;  Location: SURGERY [facility];  Service:     TOE FUSION Right 2015    Procedure: TOE FUSION 1ST METATARSALPHALANGEAL JOINT;  Surgeon: Heriberto Alves M.D.;  Location: SURGERY [facility];  Service:     BONE SPUR EXCISION  2015    Procedure: BONE SPUR EXCISION METATARSAL HEAD 2-3;  Surgeon: Heriberto Alves M.D.;  Location: SURGERY [facility];  Service:     HAMMERTOE CORRECTION  2015    Procedure: HAMMERTOE CORRECTION AND SOFT TISSUE RE-ALINGMENT 2-3 ;  Surgeon: Heriberto Alves M.D.;  Location: SURGERY [facility];  Service:     DENTAL EXTRACTION(S)      all of upper teeth    ABDOMINAL ABSCESS DRAINAGE  2011    Performed by VERO WRIGHT at SURGERY [facility]    EMANUEL BY LAPAROSCOPY  2011    Performed by VERO WRIGHT at SURGERY [facility]    APPENDECTOMY      Found out it had ruptured prior to abcess surgery    REPEAT C SECTION      REPEAT C SECTION      REPEAT C SECTION      PRIMARY C SECTION      TONSILLECTOMY  1982    WRIST EXPLORATION Left     fractured wrist-no hardware         FAMILY HISTORY  Family History   Problem Relation Age of Onset    Cancer Mother     Heart Disease Mother     Hypertension Mother     Heart Disease Father     Hypertension Father           SOCIAL HISTORY  Social History     Socioeconomic History    Marital status:      Spouse name: Ousmane    Number of children: 5    Years of education: Not on file    Highest education level: Not on file   Occupational History    Not on file   Tobacco Use    Smoking status: Former     Current packs/day: 0.00     Average packs/day: 0.5 packs/day for 30.0 years (15.0 ttl pk-yrs)     Types: Cigarettes     Quit date: 2023     Years since quittin.2     Passive exposure: Never    Smokeless tobacco: Never   Vaping Use    Vaping Use: Never used   Substance and Sexual Activity    Alcohol use: Never     Drug use: Never    Sexual activity: Not on file   Other Topics Concern     Service No    Blood Transfusions Yes    Caffeine Concern No    Occupational Exposure No    Hobby Hazards No    Sleep Concern No    Stress Concern Yes    Weight Concern No    Special Diet No    Back Care No    Exercise Yes    Bike Helmet Yes    Seat Belt Yes    Self-Exams Yes   Social History Narrative    ** Merged History Encounter **          Social Determinants of Health     Financial Resource Strain: Low Risk  (2/13/2024)    Overall Financial Resource Strain (CARDIA)     Difficulty of Paying Living Expenses: Not hard at all   Food Insecurity: No Food Insecurity (2/13/2024)    Hunger Vital Sign     Worried About Running Out of Food in the Last Year: Never true     Ran Out of Food in the Last Year: Never true   Transportation Needs: No Transportation Needs (2/13/2024)    PRAPARE - Transportation     Lack of Transportation (Medical): No     Lack of Transportation (Non-Medical): No   Physical Activity: Not on file   Stress: Not on file   Social Connections: Not on file   Intimate Partner Violence: Not on file   Housing Stability: Low Risk  (2/13/2024)    Housing Stability Vital Sign     Unable to Pay for Housing in the Last Year: No     Number of Places Lived in the Last Year: 1     Unstable Housing in the Last Year: No         CURRENT MEDICATIONS  No current facility-administered medications on file prior to encounter.     Current Outpatient Medications on File Prior to Encounter   Medication Sig Dispense Refill    QUEtiapine (SEROQUEL) 100 MG Tab Take 1 Tablet by mouth every evening. 90 Tablet 0    [START ON 4/18/2024] oxyCODONE-acetaminophen (PERCOCET-10)  MG Tab Take 1 Tablet by mouth every four hours as needed for Severe Pain for up to 30 days. Indications: Pain 150 Tablet 0    [START ON 3/18/2024] oxyCODONE-acetaminophen (PERCOCET)  MG Tab Take 1 Tablet by mouth every four hours as needed for Severe Pain for up to 30  "days. Indications: Pain 150 Tablet 0    oxyCODONE-acetaminophen (PERCOCET-10)  MG Tab Take 1 Tablet by mouth every four hours as needed for Severe Pain for up to 30 days. Indications: Pain 150 Tablet 0    metoclopramide (REGLAN) 10 MG Tab Take 1 Tablet by mouth 4 times a day as needed (nausea and vomitng). 30 Tablet 0    ondansetron (ZOFRAN ODT) 4 MG TABLET DISPERSIBLE Take 1 Tablet by mouth every 6 hours as needed for Nausea/Vomiting. 30 Tablet 0    omeprazole (PRILOSEC) 40 MG delayed-release capsule Take 40 mg by mouth 2 times a day.      PARoxetine (PAXIL) 30 MG Tab TAKE 2 TABLETS(60 MG) BY MOUTH EVERY EVENING (Patient taking differently: Take 60 mg by mouth at bedtime.) 180 Tablet 0    acetaminophen (TYLENOL) 325 MG Tab Take 325-650 mg by mouth every 6 hours as needed. Indications: Pain             ALLERGIES  Allergies   Allergen Reactions    Penicillins Anaphylaxis and Hives     Tolerates cephalosporins; reports throat swelling with PCN    Aripiprazole Nausea     Spasms, shaking      Nitrous Oxide Vomiting       PHYSICAL EXAM  VITAL SIGNS:/66   Pulse 62   Temp 36.4 °C (97.6 °F) (Temporal)   Resp 17   Ht 1.6 m (5' 3\")   Wt 38.6 kg (85 lb)   LMP 09/21/2011   SpO2 99%   BMI 15.06 kg/m²       VITALS - vital signs documented prior to this note have been reviewed and noted,  GENERAL -  awake alert cachectic appearing female  HEENT - normocephalic, atraumatic, pupils equal, sclera anicteric, mucus  membranes moist  NECK - supple, no meningismus, full active range of motion, trachea midline  CARDIOVASCULAR - regular rate/rhythm, no murmurs/gallops/rubs  PULMONARY - no respiratory distress, speaking in full sentences, clear to  auscultation bilaterally, no wheezing/ronchi/rales, no accessory muscle use  GASTROINTESTINAL -mild epigastric tenderness, otherwise soft nontender nondistended  GENITOURINARY -abdominal cramping normal wall tenderness with stool in the rectal vault  NEUROLOGIC - Awake alert, " normal mental status, speech fluid, cognition  normal, moves all extremities  MUSCULOSKELETAL - no obvious asymmetry or deformities present  EXTREMITIES - warm, well-perfused, no cyanosis or significant edema  DERMATOLOGIC - warm, dry, no rashes, no jaundice  PSYCHIATRIC - normal affect, normal insight, normal concentration    DIAGNOSTIC STUDIES / PROCEDURES      LABS  Labs Reviewed   CBC WITH DIFFERENTIAL - Abnormal; Notable for the following components:       Result Value    RBC 3.50 (*)     Hemoglobin 11.0 (*)     Hematocrit 34.0 (*)     RDW 55.8 (*)     Platelet Count 512 (*)     Lymphocytes 19.40 (*)     All other components within normal limits    Narrative:     Indicate which anticoagulants the patient is on:->NONE   COMP METABOLIC PANEL - Abnormal; Notable for the following components:    Potassium 3.3 (*)     Chloride 114 (*)     Co2 12 (*)     AST(SGOT) 7 (*)     Alkaline Phosphatase 157 (*)     Globulin 4.0 (*)     All other components within normal limits    Narrative:     Indicate which anticoagulants the patient is on:->NONE   PROTHROMBIN TIME - Abnormal; Notable for the following components:    PT 14.8 (*)     INR 1.15 (*)     All other components within normal limits    Narrative:     Indicate which anticoagulants the patient is on:->NONE   LIPASE    Narrative:     Indicate which anticoagulants the patient is on:->NONE   COD (ADULT)   ESTIMATED GFR    Narrative:     Indicate which anticoagulants the patient is on:->NONE       Labs do show non-anion gap metabolic acidosis has a known history of renal tubular acidosis and pending nephrology follow-up, hemoglobin is increased from 9.9-11        Radiologist interpretation:   No orders to display        COURSE & MEDICAL DECISION MAKING    ED COURSE:        INTERVENTIONS BY ME:  Medications   pantoprazole (Protonix) injection 40 mg (40 mg Intravenous Given 3/7/24 1638)   ondansetron (Zofran) syringe/vial injection 4 mg (4 mg Intravenous Given 3/7/24 1637)    morphine 4 MG/ML injection 4 mg (4 mg Intravenous Given 3/7/24 1638)   HYDROmorphone (Dilaudid) injection 0.5 mg (0.5 mg Intravenous Given 3/7/24 1738)         Rectal exam performed at 1640 nurse lamar in room,stool guaiac test is negative, brown stool in vault no impaction.     1735 patient reports feeling better repeat abdominal exam is reassuring demonstrating no rebound guarding or peritonitis, will attempt a p.o. trial    Reevaluation 1828 patient's pain is controlled she is tolerating p.o. patient is eager to be discharged home.  Return precautions discussed at length she will be discharged      Response on recheck: Improved and will be discharged.        INITIAL ASSESSMENT, COURSE AND PLAN  Care Narrative: Patient presented for evaluation of epigastric abdominal pain as well as nausea vomiting, as well as dark-colored stools.  Patient does report a history of chronic abdominal pain, states that this feels similar to her chronic pain, is no worse than usual.  Has had several episodes of nonbloody nonbilious vomiting, though is passing gas, and states that she is having regular bowel movements.  Mild epigastric tenderness on examination otherwise had a reassuring abdominal exam, she was treated with a dose of Zofran, morphine and Protonix for her epigastric abdominal pain rectal exam was performed in the emergency department there was brown stool in the rectal vault, stool guaiac test was negative.  Hemoglobin was trending up to 11 from 9.9 a week ago.  Her CMP did show a bicarb of 12, though she has a known history of renal tubular acidosis and and pending outpatient nephrology consultation, appears unchanged when compared to her most recent bicarb.  After 2 rounds of pain medication, patient was given a p.o. trial and was tolerating p.o.  She reported feeling much better and her abdominal exam was reassuring thus I consider discharge reasonable return precautions were discussed at length and she was  discharged in stable condition.  History of renal tubular acidosis history of anemia             ADDITIONAL PROBLEM LIST  History of chronic abdominal pain, anemia, renal tubular acidosis  DISPOSITION AND DISCUSSIONS      Escalation of care considered, and ultimately not performed:diagnostic imaging consider obtaining CT abdomen pelvis though her pain resolved after 2 rounds of pain medication, she had no further episodes of vomiting in the emergency department does have a longstanding history of chronic abdominal pain thus will defer and lieu of having her return for abdominal recheck in the next 12 hours if her symptoms do not improve      Decision tools and prescription drugs considered including, but not limited to:  Patient declined additional antibiotics .    FINAL DIAGNOSIS  1. Epigastric pain    2. Nausea and vomiting, unspecified vomiting type    #3 anion gap metabolic acidosis         Electronically signed by: Emil Mueller DO ,7:41 PM 03/07/24

## 2024-03-13 ENCOUNTER — APPOINTMENT (OUTPATIENT)
Dept: RADIOLOGY | Facility: MEDICAL CENTER | Age: 54
DRG: 391 | End: 2024-03-13
Attending: EMERGENCY MEDICINE
Payer: MEDICAID

## 2024-03-13 ENCOUNTER — APPOINTMENT (OUTPATIENT)
Dept: RADIOLOGY | Facility: MEDICAL CENTER | Age: 54
DRG: 391 | End: 2024-03-13
Payer: MEDICAID

## 2024-03-13 ENCOUNTER — HOSPITAL ENCOUNTER (INPATIENT)
Facility: MEDICAL CENTER | Age: 54
LOS: 4 days | DRG: 391 | End: 2024-03-17
Attending: EMERGENCY MEDICINE | Admitting: INTERNAL MEDICINE
Payer: MEDICAID

## 2024-03-13 DIAGNOSIS — F17.200 TOBACCO DEPENDENCE: ICD-10-CM

## 2024-03-13 DIAGNOSIS — K59.03 DRUG-INDUCED CONSTIPATION: ICD-10-CM

## 2024-03-13 DIAGNOSIS — R65.10 SIRS (SYSTEMIC INFLAMMATORY RESPONSE SYNDROME) (HCC): ICD-10-CM

## 2024-03-13 DIAGNOSIS — R10.84 GENERALIZED ABDOMINAL PAIN: ICD-10-CM

## 2024-03-13 DIAGNOSIS — E83.39 HYPOPHOSPHATEMIA: ICD-10-CM

## 2024-03-13 DIAGNOSIS — E03.9 ACQUIRED HYPOTHYROIDISM: ICD-10-CM

## 2024-03-13 DIAGNOSIS — E87.20 METABOLIC ACIDOSIS: ICD-10-CM

## 2024-03-13 DIAGNOSIS — E87.6 HYPOKALEMIA: ICD-10-CM

## 2024-03-13 DIAGNOSIS — G89.4 CHRONIC PAIN SYNDROME: ICD-10-CM

## 2024-03-13 DIAGNOSIS — I95.9 HYPOTENSION, UNSPECIFIED HYPOTENSION TYPE: ICD-10-CM

## 2024-03-13 DIAGNOSIS — M05.79 RHEUMATOID ARTHRITIS INVOLVING MULTIPLE SITES WITH POSITIVE RHEUMATOID FACTOR (HCC): ICD-10-CM

## 2024-03-13 PROBLEM — K59.00 CONSTIPATION: Status: ACTIVE | Noted: 2024-03-13

## 2024-03-13 LAB
ABO GROUP BLD: NORMAL
ALBUMIN SERPL BCP-MCNC: 3.3 G/DL (ref 3.2–4.9)
ALBUMIN/GLOB SERPL: 1.1 G/DL
ALP SERPL-CCNC: 122 U/L (ref 30–99)
ALT SERPL-CCNC: <5 U/L (ref 2–50)
ANION GAP SERPL CALC-SCNC: 14 MMOL/L (ref 7–16)
ANION GAP SERPL CALC-SCNC: 15 MMOL/L (ref 7–16)
APPEARANCE UR: CLEAR
APTT PPP: 26.9 SEC (ref 24.7–36)
AST SERPL-CCNC: 9 U/L (ref 12–45)
BASOPHILS # BLD AUTO: 0.3 % (ref 0–1.8)
BASOPHILS # BLD AUTO: 0.4 % (ref 0–1.8)
BASOPHILS # BLD: 0.04 K/UL (ref 0–0.12)
BASOPHILS # BLD: 0.07 K/UL (ref 0–0.12)
BILIRUB SERPL-MCNC: <0.2 MG/DL (ref 0.1–1.5)
BILIRUB UR QL STRIP.AUTO: NEGATIVE
BLD GP AB SCN SERPL QL: NORMAL
BUN SERPL-MCNC: 17 MG/DL (ref 8–22)
BUN SERPL-MCNC: 17 MG/DL (ref 8–22)
CALCIUM ALBUM COR SERPL-MCNC: 8.8 MG/DL (ref 8.5–10.5)
CALCIUM SERPL-MCNC: 7.7 MG/DL (ref 8.5–10.5)
CALCIUM SERPL-MCNC: 8.2 MG/DL (ref 8.5–10.5)
CHLORIDE SERPL-SCNC: 113 MMOL/L (ref 96–112)
CHLORIDE SERPL-SCNC: 116 MMOL/L (ref 96–112)
CO2 SERPL-SCNC: 7 MMOL/L (ref 20–33)
CO2 SERPL-SCNC: 9 MMOL/L (ref 20–33)
COLOR UR: YELLOW
CORTIS SERPL-MCNC: 38.1 UG/DL (ref 0–23)
CREAT SERPL-MCNC: 0.78 MG/DL (ref 0.5–1.4)
CREAT SERPL-MCNC: 0.95 MG/DL (ref 0.5–1.4)
EKG IMPRESSION: NORMAL
EOSINOPHIL # BLD AUTO: 0.01 K/UL (ref 0–0.51)
EOSINOPHIL # BLD AUTO: 0.02 K/UL (ref 0–0.51)
EOSINOPHIL NFR BLD: 0.1 % (ref 0–6.9)
EOSINOPHIL NFR BLD: 0.1 % (ref 0–6.9)
ERYTHROCYTE [DISTWIDTH] IN BLOOD BY AUTOMATED COUNT: 58.9 FL (ref 35.9–50)
ERYTHROCYTE [DISTWIDTH] IN BLOOD BY AUTOMATED COUNT: 59.7 FL (ref 35.9–50)
GFR SERPLBLD CREATININE-BSD FMLA CKD-EPI: 72 ML/MIN/1.73 M 2
GFR SERPLBLD CREATININE-BSD FMLA CKD-EPI: 91 ML/MIN/1.73 M 2
GLOBULIN SER CALC-MCNC: 3 G/DL (ref 1.9–3.5)
GLUCOSE SERPL-MCNC: 97 MG/DL (ref 65–99)
GLUCOSE SERPL-MCNC: 99 MG/DL (ref 65–99)
GLUCOSE UR STRIP.AUTO-MCNC: NEGATIVE MG/DL
HCT VFR BLD AUTO: 26.8 % (ref 37–47)
HCT VFR BLD AUTO: 28.9 % (ref 37–47)
HGB BLD-MCNC: 8.7 G/DL (ref 12–16)
HGB BLD-MCNC: 9.2 G/DL (ref 12–16)
IMM GRANULOCYTES # BLD AUTO: 0.08 K/UL (ref 0–0.11)
IMM GRANULOCYTES # BLD AUTO: 0.16 K/UL (ref 0–0.11)
IMM GRANULOCYTES NFR BLD AUTO: 0.5 % (ref 0–0.9)
IMM GRANULOCYTES NFR BLD AUTO: 0.8 % (ref 0–0.9)
INR PPP: 1.19 (ref 0.87–1.13)
KETONES UR STRIP.AUTO-MCNC: NEGATIVE MG/DL
LACTATE SERPL-SCNC: 1.3 MMOL/L (ref 0.5–2)
LEUKOCYTE ESTERASE UR QL STRIP.AUTO: NEGATIVE
LIPASE SERPL-CCNC: 66 U/L (ref 11–82)
LYMPHOCYTES # BLD AUTO: 0.35 K/UL (ref 1–4.8)
LYMPHOCYTES # BLD AUTO: 0.55 K/UL (ref 1–4.8)
LYMPHOCYTES NFR BLD: 2.3 % (ref 22–41)
LYMPHOCYTES NFR BLD: 2.8 % (ref 22–41)
MAGNESIUM SERPL-MCNC: 1.7 MG/DL (ref 1.5–2.5)
MCH RBC QN AUTO: 31.5 PG (ref 27–33)
MCH RBC QN AUTO: 31.8 PG (ref 27–33)
MCHC RBC AUTO-ENTMCNC: 31.8 G/DL (ref 32.2–35.5)
MCHC RBC AUTO-ENTMCNC: 32.5 G/DL (ref 32.2–35.5)
MCV RBC AUTO: 97.8 FL (ref 81.4–97.8)
MCV RBC AUTO: 99 FL (ref 81.4–97.8)
MICRO URNS: NORMAL
MONOCYTES # BLD AUTO: 0.79 K/UL (ref 0–0.85)
MONOCYTES # BLD AUTO: 0.97 K/UL (ref 0–0.85)
MONOCYTES NFR BLD AUTO: 5 % (ref 0–13.4)
MONOCYTES NFR BLD AUTO: 5.2 % (ref 0–13.4)
NEUTROPHILS # BLD AUTO: 13.87 K/UL (ref 1.82–7.42)
NEUTROPHILS # BLD AUTO: 17.69 K/UL (ref 1.82–7.42)
NEUTROPHILS NFR BLD: 90.9 % (ref 44–72)
NEUTROPHILS NFR BLD: 91.6 % (ref 44–72)
NITRITE UR QL STRIP.AUTO: NEGATIVE
NRBC # BLD AUTO: 0 K/UL
NRBC # BLD AUTO: 0 K/UL
NRBC BLD-RTO: 0 /100 WBC (ref 0–0.2)
NRBC BLD-RTO: 0 /100 WBC (ref 0–0.2)
PH UR STRIP.AUTO: 6 [PH] (ref 5–8)
PHOSPHATE SERPL-MCNC: 1.8 MG/DL (ref 2.5–4.5)
PLATELET # BLD AUTO: 291 K/UL (ref 164–446)
PLATELET # BLD AUTO: 322 K/UL (ref 164–446)
PMV BLD AUTO: 10.3 FL (ref 9–12.9)
PMV BLD AUTO: 9.6 FL (ref 9–12.9)
POTASSIUM SERPL-SCNC: 2.4 MMOL/L (ref 3.6–5.5)
POTASSIUM SERPL-SCNC: 3 MMOL/L (ref 3.6–5.5)
PROCALCITONIN SERPL-MCNC: 17 NG/ML
PROT SERPL-MCNC: 6.3 G/DL (ref 6–8.2)
PROT UR QL STRIP: NEGATIVE MG/DL
PROTHROMBIN TIME: 15.2 SEC (ref 12–14.6)
RBC # BLD AUTO: 2.74 M/UL (ref 4.2–5.4)
RBC # BLD AUTO: 2.92 M/UL (ref 4.2–5.4)
RBC UR QL AUTO: NEGATIVE
RH BLD: NORMAL
SODIUM SERPL-SCNC: 135 MMOL/L (ref 135–145)
SODIUM SERPL-SCNC: 139 MMOL/L (ref 135–145)
SP GR UR STRIP.AUTO: 1.01
UROBILINOGEN UR STRIP.AUTO-MCNC: 0.2 MG/DL
WBC # BLD AUTO: 15.2 K/UL (ref 4.8–10.8)
WBC # BLD AUTO: 19.5 K/UL (ref 4.8–10.8)

## 2024-03-13 PROCEDURE — 71045 X-RAY EXAM CHEST 1 VIEW: CPT

## 2024-03-13 PROCEDURE — A9270 NON-COVERED ITEM OR SERVICE: HCPCS | Mod: JZ,UD | Performed by: EMERGENCY MEDICINE

## 2024-03-13 PROCEDURE — 87040 BLOOD CULTURE FOR BACTERIA: CPT | Mod: 91

## 2024-03-13 PROCEDURE — 83690 ASSAY OF LIPASE: CPT

## 2024-03-13 PROCEDURE — 86850 RBC ANTIBODY SCREEN: CPT

## 2024-03-13 PROCEDURE — C1751 CATH, INF, PER/CENT/MIDLINE: HCPCS

## 2024-03-13 PROCEDURE — 700102 HCHG RX REV CODE 250 W/ 637 OVERRIDE(OP): Performed by: INTERNAL MEDICINE

## 2024-03-13 PROCEDURE — 700111 HCHG RX REV CODE 636 W/ 250 OVERRIDE (IP): Mod: JZ | Performed by: STUDENT IN AN ORGANIZED HEALTH CARE EDUCATION/TRAINING PROGRAM

## 2024-03-13 PROCEDURE — 83605 ASSAY OF LACTIC ACID: CPT

## 2024-03-13 PROCEDURE — 700102 HCHG RX REV CODE 250 W/ 637 OVERRIDE(OP): Mod: JZ,UD | Performed by: EMERGENCY MEDICINE

## 2024-03-13 PROCEDURE — 84145 PROCALCITONIN (PCT): CPT

## 2024-03-13 PROCEDURE — 85025 COMPLETE CBC W/AUTO DIFF WBC: CPT

## 2024-03-13 PROCEDURE — 86900 BLOOD TYPING SEROLOGIC ABO: CPT

## 2024-03-13 PROCEDURE — 36415 COLL VENOUS BLD VENIPUNCTURE: CPT

## 2024-03-13 PROCEDURE — 82533 TOTAL CORTISOL: CPT

## 2024-03-13 PROCEDURE — 74177 CT ABD & PELVIS W/CONTRAST: CPT

## 2024-03-13 PROCEDURE — 84100 ASSAY OF PHOSPHORUS: CPT

## 2024-03-13 PROCEDURE — 93005 ELECTROCARDIOGRAM TRACING: CPT

## 2024-03-13 PROCEDURE — 36556 INSERT NON-TUNNEL CV CATH: CPT

## 2024-03-13 PROCEDURE — 93005 ELECTROCARDIOGRAM TRACING: CPT | Performed by: EMERGENCY MEDICINE

## 2024-03-13 PROCEDURE — 770000 HCHG ROOM/CARE - INTERMEDIATE ICU *

## 2024-03-13 PROCEDURE — 99285 EMERGENCY DEPT VISIT HI MDM: CPT

## 2024-03-13 PROCEDURE — 80048 BASIC METABOLIC PNL TOTAL CA: CPT

## 2024-03-13 PROCEDURE — 96376 TX/PRO/DX INJ SAME DRUG ADON: CPT

## 2024-03-13 PROCEDURE — 700105 HCHG RX REV CODE 258: Mod: UD | Performed by: EMERGENCY MEDICINE

## 2024-03-13 PROCEDURE — 96375 TX/PRO/DX INJ NEW DRUG ADDON: CPT

## 2024-03-13 PROCEDURE — 87086 URINE CULTURE/COLONY COUNT: CPT

## 2024-03-13 PROCEDURE — 83735 ASSAY OF MAGNESIUM: CPT

## 2024-03-13 PROCEDURE — 99223 1ST HOSP IP/OBS HIGH 75: CPT | Mod: 25 | Performed by: INTERNAL MEDICINE

## 2024-03-13 PROCEDURE — A9270 NON-COVERED ITEM OR SERVICE: HCPCS | Performed by: INTERNAL MEDICINE

## 2024-03-13 PROCEDURE — 80053 COMPREHEN METABOLIC PANEL: CPT

## 2024-03-13 PROCEDURE — 86901 BLOOD TYPING SEROLOGIC RH(D): CPT

## 2024-03-13 PROCEDURE — 700105 HCHG RX REV CODE 258: Performed by: INTERNAL MEDICINE

## 2024-03-13 PROCEDURE — 81003 URINALYSIS AUTO W/O SCOPE: CPT

## 2024-03-13 PROCEDURE — 99497 ADVNCD CARE PLAN 30 MIN: CPT | Performed by: INTERNAL MEDICINE

## 2024-03-13 PROCEDURE — 85610 PROTHROMBIN TIME: CPT

## 2024-03-13 PROCEDURE — 96365 THER/PROPH/DIAG IV INF INIT: CPT

## 2024-03-13 PROCEDURE — 700117 HCHG RX CONTRAST REV CODE 255: Mod: UD | Performed by: EMERGENCY MEDICINE

## 2024-03-13 PROCEDURE — 700101 HCHG RX REV CODE 250: Performed by: INTERNAL MEDICINE

## 2024-03-13 PROCEDURE — 700111 HCHG RX REV CODE 636 W/ 250 OVERRIDE (IP): Mod: JZ,UD | Performed by: EMERGENCY MEDICINE

## 2024-03-13 PROCEDURE — 85730 THROMBOPLASTIN TIME PARTIAL: CPT

## 2024-03-13 PROCEDURE — 02HV33Z INSERTION OF INFUSION DEVICE INTO SUPERIOR VENA CAVA, PERCUTANEOUS APPROACH: ICD-10-PCS | Performed by: INTERNAL MEDICINE

## 2024-03-13 PROCEDURE — 700111 HCHG RX REV CODE 636 W/ 250 OVERRIDE (IP): Mod: JZ | Performed by: INTERNAL MEDICINE

## 2024-03-13 RX ORDER — PROMETHAZINE HYDROCHLORIDE 25 MG/1
12.5-25 SUPPOSITORY RECTAL EVERY 4 HOURS PRN
Status: DISCONTINUED | OUTPATIENT
Start: 2024-03-13 | End: 2024-03-17 | Stop reason: HOSPADM

## 2024-03-13 RX ORDER — SODIUM BICARBONATE 650 MG/1
650 TABLET ORAL 3 TIMES DAILY
Status: DISCONTINUED | OUTPATIENT
Start: 2024-03-13 | End: 2024-03-17 | Stop reason: HOSPADM

## 2024-03-13 RX ORDER — OXYCODONE AND ACETAMINOPHEN 10; 325 MG/1; MG/1
1 TABLET ORAL EVERY 4 HOURS PRN
Status: DISCONTINUED | OUTPATIENT
Start: 2024-04-18 | End: 2024-03-13

## 2024-03-13 RX ORDER — ONDANSETRON 2 MG/ML
4 INJECTION INTRAMUSCULAR; INTRAVENOUS ONCE
Status: COMPLETED | OUTPATIENT
Start: 2024-03-13 | End: 2024-03-13

## 2024-03-13 RX ORDER — METRONIDAZOLE 500 MG/100ML
500 INJECTION, SOLUTION INTRAVENOUS EVERY 12 HOURS
Status: DISCONTINUED | OUTPATIENT
Start: 2024-03-13 | End: 2024-03-15

## 2024-03-13 RX ORDER — MAGNESIUM SULFATE HEPTAHYDRATE 40 MG/ML
2 INJECTION, SOLUTION INTRAVENOUS ONCE
Status: COMPLETED | OUTPATIENT
Start: 2024-03-13 | End: 2024-03-13

## 2024-03-13 RX ORDER — HEPARIN SODIUM 5000 [USP'U]/ML
5000 INJECTION, SOLUTION INTRAVENOUS; SUBCUTANEOUS EVERY 8 HOURS
Status: DISCONTINUED | OUTPATIENT
Start: 2024-03-13 | End: 2024-03-14

## 2024-03-13 RX ORDER — ONDANSETRON 4 MG/1
4 TABLET, ORALLY DISINTEGRATING ORAL EVERY 4 HOURS PRN
Status: DISCONTINUED | OUTPATIENT
Start: 2024-03-13 | End: 2024-03-13

## 2024-03-13 RX ORDER — SODIUM CHLORIDE 9 MG/ML
1000 INJECTION, SOLUTION INTRAVENOUS ONCE
Status: COMPLETED | OUTPATIENT
Start: 2024-03-13 | End: 2024-03-13

## 2024-03-13 RX ORDER — POTASSIUM CHLORIDE 7.45 MG/ML
10 INJECTION INTRAVENOUS ONCE
Status: COMPLETED | OUTPATIENT
Start: 2024-03-13 | End: 2024-03-13

## 2024-03-13 RX ORDER — PAROXETINE HYDROCHLORIDE 20 MG/1
60 TABLET, FILM COATED ORAL
Status: DISCONTINUED | OUTPATIENT
Start: 2024-03-13 | End: 2024-03-17 | Stop reason: HOSPADM

## 2024-03-13 RX ORDER — SODIUM CHLORIDE 9 MG/ML
INJECTION, SOLUTION INTRAVENOUS CONTINUOUS
Status: DISCONTINUED | OUTPATIENT
Start: 2024-03-13 | End: 2024-03-14

## 2024-03-13 RX ORDER — AMOXICILLIN 250 MG
2 CAPSULE ORAL EVERY EVENING
Status: DISCONTINUED | OUTPATIENT
Start: 2024-03-13 | End: 2024-03-15

## 2024-03-13 RX ORDER — MIDAZOLAM HYDROCHLORIDE 1 MG/ML
1 INJECTION INTRAMUSCULAR; INTRAVENOUS
Status: DISCONTINUED | OUTPATIENT
Start: 2024-03-13 | End: 2024-03-17 | Stop reason: HOSPADM

## 2024-03-13 RX ORDER — MORPHINE SULFATE 4 MG/ML
3 INJECTION INTRAVENOUS ONCE
Status: COMPLETED | OUTPATIENT
Start: 2024-03-13 | End: 2024-03-13

## 2024-03-13 RX ORDER — QUETIAPINE FUMARATE 25 MG/1
100 TABLET, FILM COATED ORAL NIGHTLY
Status: DISCONTINUED | OUTPATIENT
Start: 2024-03-13 | End: 2024-03-17 | Stop reason: HOSPADM

## 2024-03-13 RX ORDER — ACETAMINOPHEN 325 MG/1
650 TABLET ORAL EVERY 6 HOURS PRN
Status: DISCONTINUED | OUTPATIENT
Start: 2024-03-13 | End: 2024-03-14

## 2024-03-13 RX ORDER — OXYCODONE AND ACETAMINOPHEN 10; 325 MG/1; MG/1
1 TABLET ORAL EVERY 4 HOURS PRN
Status: DISCONTINUED | OUTPATIENT
Start: 2024-03-18 | End: 2024-03-13

## 2024-03-13 RX ORDER — POLYETHYLENE GLYCOL 3350 17 G/17G
1 POWDER, FOR SOLUTION ORAL
Status: DISCONTINUED | OUTPATIENT
Start: 2024-03-13 | End: 2024-03-15

## 2024-03-13 RX ORDER — MIDODRINE HYDROCHLORIDE 5 MG/1
5 TABLET ORAL
Status: DISCONTINUED | OUTPATIENT
Start: 2024-03-13 | End: 2024-03-17 | Stop reason: HOSPADM

## 2024-03-13 RX ORDER — ONDANSETRON 2 MG/ML
4 INJECTION INTRAMUSCULAR; INTRAVENOUS EVERY 4 HOURS PRN
Status: DISCONTINUED | OUTPATIENT
Start: 2024-03-13 | End: 2024-03-13

## 2024-03-13 RX ORDER — OXYCODONE AND ACETAMINOPHEN 10; 325 MG/1; MG/1
1 TABLET ORAL EVERY 4 HOURS PRN
Status: DISCONTINUED | OUTPATIENT
Start: 2024-03-13 | End: 2024-03-14

## 2024-03-13 RX ORDER — POTASSIUM CHLORIDE 20 MEQ/1
20 TABLET, EXTENDED RELEASE ORAL ONCE
Status: COMPLETED | OUTPATIENT
Start: 2024-03-13 | End: 2024-03-13

## 2024-03-13 RX ORDER — OMEPRAZOLE 20 MG/1
40 CAPSULE, DELAYED RELEASE ORAL 2 TIMES DAILY
Status: DISCONTINUED | OUTPATIENT
Start: 2024-03-13 | End: 2024-03-17 | Stop reason: HOSPADM

## 2024-03-13 RX ORDER — MORPHINE SULFATE 4 MG/ML
2 INJECTION INTRAVENOUS
Status: COMPLETED | OUTPATIENT
Start: 2024-03-13 | End: 2024-03-13

## 2024-03-13 RX ORDER — PROCHLORPERAZINE EDISYLATE 5 MG/ML
5-10 INJECTION INTRAMUSCULAR; INTRAVENOUS EVERY 4 HOURS PRN
Status: DISCONTINUED | OUTPATIENT
Start: 2024-03-13 | End: 2024-03-17 | Stop reason: HOSPADM

## 2024-03-13 RX ORDER — PROMETHAZINE HYDROCHLORIDE 25 MG/1
12.5-25 TABLET ORAL EVERY 4 HOURS PRN
Status: DISCONTINUED | OUTPATIENT
Start: 2024-03-13 | End: 2024-03-17 | Stop reason: HOSPADM

## 2024-03-13 RX ADMIN — OMEPRAZOLE 40 MG: 20 CAPSULE, DELAYED RELEASE ORAL at 18:35

## 2024-03-13 RX ADMIN — SODIUM CHLORIDE 1000 ML: 9 INJECTION, SOLUTION INTRAVENOUS at 15:55

## 2024-03-13 RX ADMIN — POTASSIUM CHLORIDE 20 MEQ: 1500 TABLET, EXTENDED RELEASE ORAL at 15:08

## 2024-03-13 RX ADMIN — POTASSIUM PHOSPHATE, MONOBASIC AND POTASSIUM PHOSPHATE, DIBASIC 30 MMOL: 224; 236 INJECTION, SOLUTION, CONCENTRATE INTRAVENOUS at 21:11

## 2024-03-13 RX ADMIN — METRONIDAZOLE 500 MG: 500 INJECTION, SOLUTION INTRAVENOUS at 21:06

## 2024-03-13 RX ADMIN — QUETIAPINE FUMARATE 100 MG: 100 TABLET ORAL at 21:20

## 2024-03-13 RX ADMIN — SODIUM BICARBONATE 650 MG: 650 TABLET ORAL at 18:35

## 2024-03-13 RX ADMIN — SODIUM BICARBONATE 650 MG: 650 TABLET ORAL at 21:16

## 2024-03-13 RX ADMIN — SODIUM CHLORIDE: 9 INJECTION, SOLUTION INTRAVENOUS at 17:52

## 2024-03-13 RX ADMIN — CEFEPIME 2 G: 2 INJECTION, POWDER, FOR SOLUTION INTRAVENOUS at 23:28

## 2024-03-13 RX ADMIN — SODIUM CHLORIDE 1000 ML: 9 INJECTION, SOLUTION INTRAVENOUS at 13:28

## 2024-03-13 RX ADMIN — SODIUM BICARBONATE 50 MEQ: 84 INJECTION INTRAVENOUS at 15:06

## 2024-03-13 RX ADMIN — MORPHINE SULFATE 3 MG: 4 INJECTION, SOLUTION INTRAMUSCULAR; INTRAVENOUS at 22:41

## 2024-03-13 RX ADMIN — MORPHINE SULFATE 2 MG: 4 INJECTION, SOLUTION INTRAMUSCULAR; INTRAVENOUS at 15:09

## 2024-03-13 RX ADMIN — MORPHINE SULFATE 2 MG: 4 INJECTION, SOLUTION INTRAMUSCULAR; INTRAVENOUS at 13:49

## 2024-03-13 RX ADMIN — MAGNESIUM SULFATE HEPTAHYDRATE 2 G: 2 INJECTION, SOLUTION INTRAVENOUS at 21:15

## 2024-03-13 RX ADMIN — POTASSIUM CHLORIDE 10 MEQ: 7.46 INJECTION, SOLUTION INTRAVENOUS at 15:09

## 2024-03-13 RX ADMIN — PAROXETINE 60 MG: 30 TABLET, FILM COATED ORAL at 21:17

## 2024-03-13 RX ADMIN — OXYCODONE HYDROCHLORIDE AND ACETAMINOPHEN 1 TABLET: 10; 325 TABLET ORAL at 18:38

## 2024-03-13 RX ADMIN — IOHEXOL 100 ML: 350 INJECTION, SOLUTION INTRAVENOUS at 14:16

## 2024-03-13 RX ADMIN — MIDODRINE HYDROCHLORIDE 5 MG: 5 TABLET ORAL at 18:35

## 2024-03-13 RX ADMIN — HEPARIN SODIUM 5000 UNITS: 5000 INJECTION, SOLUTION INTRAVENOUS; SUBCUTANEOUS at 21:23

## 2024-03-13 RX ADMIN — ONDANSETRON 4 MG: 2 INJECTION INTRAMUSCULAR; INTRAVENOUS at 13:49

## 2024-03-13 ASSESSMENT — ENCOUNTER SYMPTOMS
COUGH: 0
FEVER: 0
WEAKNESS: 0
CHILLS: 0
PHOTOPHOBIA: 0
CLAUDICATION: 0
MYALGIAS: 0
ORTHOPNEA: 0
PALPITATIONS: 0
NAUSEA: 1
NECK PAIN: 0
HEMOPTYSIS: 0
DIARRHEA: 0
BLURRED VISION: 0
DIZZINESS: 0
CONSTIPATION: 0
DOUBLE VISION: 0
SPEECH CHANGE: 0
BLOOD IN STOOL: 0
VOMITING: 0
WEIGHT LOSS: 1
ABDOMINAL PAIN: 1

## 2024-03-13 ASSESSMENT — PAIN DESCRIPTION - PAIN TYPE
TYPE: ACUTE PAIN
TYPE: ACUTE PAIN;CHRONIC PAIN

## 2024-03-13 ASSESSMENT — LIFESTYLE VARIABLES: DO YOU DRINK ALCOHOL: NO

## 2024-03-13 ASSESSMENT — FIBROSIS 4 INDEX
FIB4 SCORE: 0.33
FIB4 SCORE: 0.67

## 2024-03-13 NOTE — PROGRESS NOTES
I have been contacted regarding appropriate disposition of Mary Martinez. I have reviewed the patient's case with KEANU RN including chart review, conversation with the provider and assessment of the patient. This patient is deemed safe to be admitted to the IMCU for fluid resuscitation, vitamin/electrolyte supplementation and infectious work-up and remains under the care of hospitalist (who all questions and concerns should be relayed to).  While a critical care consultation has not been requested, we are immediately available if the patient requires critical care in the future.

## 2024-03-13 NOTE — ASSESSMENT & PLAN NOTE
3/16/2024  Patient has a chronic hypotension  However got worse likely related to vomiting  IV fluid  Close monitoring and start with pressors if needed   Start with antibiotics for possible sepsis and de-escalate antibiotic later

## 2024-03-13 NOTE — ED NOTES
Pharmacy Medication Reconciliation      ~Medication reconciliation updated and complete per patient at bedside with medication list. Reviewed list with patient and returned at bedside   ~Allergies have been verified and updated   ~No oral ABX within the last 30 days  ~Patient home pharmacy :  [facility]       ~Anticoagulants (rivaroxaban, apixaban, edoxaban, dabigatran, warfarin, enoxaparin) taken in the last 14 days? No

## 2024-03-13 NOTE — ASSESSMENT & PLAN NOTE
3/16/2024  On chronic pain medication  Follow-up with rheumatologist  Usually is maintained with Enbrel  Start steroids for acute pain syndrome

## 2024-03-13 NOTE — ASSESSMENT & PLAN NOTE
3/16/2024  Came with abdominal pain and CT scan showed likely constipation  Patient was treated for constipation last month  Patient is on chronic opioid  Patient states she is taking stool softener at home   Scheduled MiraLAX  Follow-up with x-ray

## 2024-03-13 NOTE — ED PROVIDER NOTES
ER Provider Note    Scribed for Avery Muse M.d. by Stoney Ro. 3/13/2024  12:25 PM    Primary Care Provider: Stormy Gotti M.D.    CHIEF COMPLAINT  Chief Complaint   Patient presents with    Abdominal Pain     Pt BIBA. States they have been having abd pain x 3 weeks. Blood in stool last thursday Pt had a fall today, (-) LOC (-) trauma     EXTERNAL RECORDS REVIEWED  Outpatient Notes shows history of bleeding ulcers, bowel obstruction, and hiatal hernia. Last CT scan was 1/6/24, which was not concerning.       HPI/ROS  LIMITATION TO HISTORY   Select: : None  OUTSIDE HISTORIAN(S):  EMS , who was able to help contribute to the patient's history    Mary Martinez is a 53 y.o. female who presents to the ED via EMS for evaluation of abdominal pain onset four days ago. Patient reports that she has been having chronic abdominal pain for the past two years. She has followed up for her symptoms, and reports that no one has ever diagnosed her with anything. She notes that this current time, her abdominal pain presented as it usually does. She has been unable to eat much since the onset of her symptoms. Today, she was using her walker when she tripped and fell. Denies syncopal episode. Negative head strike, no loss of consciousness. She called EMS, who presented the patient here to the ED today for further evaluation. Current blood pressure in the ED is 104/70. She has associated bloody stools. Denies any cough or fevers. No alleviating factors reported. She has an appointment with GI in about one month. Patient last had a scope in September 2023. She has a history of rheumatoid arthritis. Surgical history of a cholecystectomy, appendectomy, and a stomach rupture. Drug allergies to Penicillins, Aripiprazole, and nitrous oxide.     PAST MEDICAL HISTORY  Past Medical History:   Diagnosis Date    Acute renal failure (ARF) (HCC)     Acute renal failure (HCC) 10/06/2011    Allergy     Anemia     Anxiety      Arthritis     Rheumatoid -follows with rheumatologist. Has several fractures due to RA.    ASTHMA     inhalers prn    Blood transfusion without reported diagnosis     Bowel habit changes     4/24/20-constipation and diarrhea.    Bronchitis last approx 2018    Chronic pain 04/24/2020    Due to RA    Dental disorder     no teeth upper-does not wear her denture. Broken teeth on bottom.    Depression     GERD (gastroesophageal reflux disease)     GIB (gastrointestinal bleeding)     Head ache     Heart burn     taking famotidine    Hiatal hernia     Hiatus hernia syndrome     History of pancreatitis     Hypokalemia     Hyponatremia     Idiopathic chronic pancreatitis (HCC)     Indigestion     taking famotidine    Intractable nausea and vomiting     Kidney disease     Leukocytosis 10/08/2011    Pain     CPS-everywhere    Pneumonia 10/2019    PONV (postoperative nausea and vomiting)     Psychiatric problem     anxiety and depression    Rheumatoid aortitis     Rheumatoid arthritis(714.0) 2003    Severe protein-calorie malnutrition (HCC)     Small bowel obstruction (HCC) 10/06/2011    SMAS (superior mesenteric artery syndrome) (HCC)     Substance abuse (HCC)     UTI (urinary tract infection) 05/25/2023    Vitamin B12 deficiency neuropathy ([facility])        SURGICAL HISTORY  Past Surgical History:   Procedure Laterality Date    AZ UPPER GI ENDOSCOPY,DIAGNOSIS N/A 8/16/2023    Procedure: GASTROSCOPY;  Surgeon: Blossom Peres M.D.;  Location: SURGERY SAME DAY [facility];  Service: Gastroenterology    AZ PLACE PERCUT GASTROSTOMY TUBE N/A 6/12/2023    Procedure: INSERTION, PEG TUBE - PEG AND J TUBE PLACEMENT;  Surgeon: Marilyn Anderson M.D.;  Location: SURGERY SAME DAY [facility];  Service: Gastroenterology    AZ UPPER GI ENDOSCOPY,DIAGNOSIS  6/12/2023    Procedure: GASTROSCOPY;  Surgeon: Marilyn Anderson M.D.;  Location: SURGERY SAME DAY [facility];  Service: Gastroenterology    AZ UPPER GI ENDOSCOPY,DIAGNOSIS N/A 9/9/2022     Procedure: ENDOSCOPIC ULTRASOUND- UPPER;  Surgeon: Jorge Brooke M.D.;  [place] [facility]  Service: EUS    EGD W/ENDOSCOPIC ULTRASOUND N/A 9/9/2022    Procedure: EGD, WITH ENDOSCOPIC US;  Surgeon: Jorge Brooke M.D.;  [place] [facility] [place]  Service: EUS    [place] ENDOSCOPIC US EXAM, ESOPH  4/29/2020    Procedure: EGD, WITH ENDOSCOPIC US;  Surgeon: Jorge Brooke M.D.;  Location: [facility];  Service: Gastroenterology    GASTROSCOPY-ENDO  4/29/2020    Procedure: GASTROSCOPY;  Surgeon: Jorge Brooke M.D.;  Location: [facility];  Service: Gastroenterology    EGD WITH ASP/BX  4/29/2020    Procedure: EGD, WITH ASPIRATION BIOPSY - POSS FNA;  Surgeon: Jorge Brooke M.D.;  Location: [facility];  Service: Gastroenterology    IL EXPLORATORY OF ABDOMEN N/A 10/4/2019    Procedure: LAPAROTOMY, EXPLORATORY AND REPAIR OF DUODENAL PERFORATION;  Surgeon: James Dumont M.D.;  Location: [facility];  Service: General    COLONOSCOPY  2018    with upper endoscopy    FINGER ARTHROPLASTY Right 6/5/2017    Procedure: FINGER ARTHROPLASTY - LONG, RING AND SMALL VOLAR PLATE;  Surgeon: Lobo Rosen M.D.;  Location: SURGERY SAME DAY [facility];  Service:     FINGER AMPUTATION  6/5/2017    Procedure: FINGER AMPUTATION - LONG, RING AND SMALL AT THE PROXIMAL INTERPHALANGEAL JOINT;  Surgeon: Lobo Rosen M.D.;  Location: SURGERY SAME DAY [facility];  Service:     FINGER ARTHROPLASTY Right 4/17/2017    Procedure: FINGER ARTHROPLASTY ;  Surgeon: Lobo Rosen M.D.;  Location: SURGERY SAME DAY [facility];  Service:     FINGER AMPUTATION Right 9/14/2016    Procedure: FINGER AMPUTATION INDEX;  Surgeon: Lobo Rosen M.D.;  Location: SURGERY SAME DAY [facility];  Service:     IRRIGATION & DEBRIDEMENT ORTHO Right 9/11/2016    Procedure: IRRIGATION & DEBRIDEMENT ORTHO - right index finger;  Surgeon:  Madhu Rosen M.D.;  Location: SURGERY [facility];  Service:     FUSION, PIP JOINT, TOE Right 8/29/2016    Procedure: RE-DO INDEX FINGER PROXIMAL INTERPHALANGEAL ARTHRODESIS;  Surgeon: Lobo Rosen M.D.;  Location: SURGERY SAME DAY [facility];  Service:     BONE GRAFT Right 8/29/2016    Procedure: BONE GRAFT - DISTAL RADIUS ;  Surgeon: Lobo Rosen M.D.;  Location: SURGERY SAME DAY [facility];  Service:     ARTHRODESIS Right 5/6/2016    Procedure: ARTHRODESIS INDEX FINGER PROXIMAL INTERPHALANGEAL;  Surgeon: Lobo Rosen M.D.;  Location: SURGERY SAME DAY [facility];  Service:     FOOT RECONSTRUCTION RHEUMATIC Left 7/29/2015    Procedure: FOOT RECONSTRUCTION RHEUMATIC;  Surgeon: Heriberto Alves M.D.;  Location: SURGERY [facility];  Service:     TOE FUSION Right 5/27/2015    Procedure: TOE FUSION 1ST METATARSALPHALANGEAL JOINT;  Surgeon: Heriberto Alves M.D.;  Location: SURGERY [facility];  Service:     BONE SPUR EXCISION  5/27/2015    Procedure: BONE SPUR EXCISION METATARSAL HEAD 2-3;  Surgeon: Heriberto Alves M.D.;  Location: SURGERY [facility];  Service:     HAMMERTOE CORRECTION  5/27/2015    Procedure: HAMMERTOE CORRECTION AND SOFT TISSUE RE-ALINGMENT 2-3 ;  Surgeon: Heriberto Alves M.D.;  Location: SURGERY [facility];  Service:     DENTAL EXTRACTION(S)  2014    all of upper teeth    ABDOMINAL ABSCESS DRAINAGE  9/27/2011    Performed by VERO WRIGHT at [facility]    EMANUEL BY LAPAROSCOPY  9/27/2011    Performed by VERO WRIGHT at [facility]    APPENDECTOMY  2011    Found out it had ruptured prior to abcess surgery    REPEAT C SECTION  2008    REPEAT C SECTION  2005    REPEAT C SECTION  1999    PRIMARY C SECTION  1991    TONSILLECTOMY  1982    WRIST EXPLORATION Left 1980's    fractured wrist-no hardware       FAMILY HISTORY  Family History   Problem Relation Age of Onset    Cancer Mother     Heart Disease Mother      "Hypertension Mother     Heart Disease Father     Hypertension Father        SOCIAL HISTORY   reports that she quit smoking about 3 months ago. Her smoking use included cigarettes. She has a 15 pack-year smoking history. She has never been exposed to tobacco smoke. She has never used smokeless tobacco. She reports that she does not drink alcohol and does not use drugs.    CURRENT MEDICATIONS  Previous Medications    ACETAMINOPHEN (TYLENOL) 325 MG TAB    Take 325-650 mg by mouth every 6 hours as needed. Indications: Pain    METOCLOPRAMIDE (REGLAN) 10 MG TAB    Take 1 Tablet by mouth 4 times a day as needed (nausea and vomiting).    OMEPRAZOLE (PRILOSEC) 40 MG DELAYED-RELEASE CAPSULE    Take 40 mg by mouth 2 times a day.    ONDANSETRON (ZOFRAN ODT) 4 MG TABLET DISPERSIBLE    Take 1 Tablet by mouth every 6 hours as needed for Nausea/Vomiting.    OXYCODONE-ACETAMINOPHEN (PERCOCET)  MG TAB    Take 1 Tablet by mouth every four hours as needed for Severe Pain for up to 30 days. Indications: Pain    OXYCODONE-ACETAMINOPHEN (PERCOCET-10)  MG TAB    Take 1 Tablet by mouth every four hours as needed for Severe Pain for up to 30 days. Indications: Pain    OXYCODONE-ACETAMINOPHEN (PERCOCET-10)  MG TAB    Take 1 Tablet by mouth every four hours as needed for Severe Pain for up to 30 days. Indications: Pain    PAROXETINE (PAXIL) 30 MG TAB    TAKE 2 TABLETS(60 MG) BY MOUTH EVERY EVENING    QUETIAPINE (SEROQUEL) 100 MG TAB    Take 1 Tablet by mouth every evening.       ALLERGIES  Penicillins, Aripiprazole, and Nitrous oxide    PHYSICAL EXAM  /70   Pulse (!) 111   Temp 37.2 °C (99 °F) (Temporal)   Resp 20   Ht 1.6 m (5' 3\")   Wt 38.6 kg (85 lb)   LMP 09/21/2011   SpO2 96%   BMI 15.06 kg/m²   Constitutional: Cachetic, Thin habitus. No acute distress, Non-toxic appearance.   HENT: Normocephalic, Atraumatic, Bilateral external ears normal, Oropharynx is clear mucous membranes are moist. No oral exudates or " nasal discharge.   Eyes: Pupils are equal round and reactive, EOMI, Conjunctiva normal, No discharge.   Neck: Normal range of motion, No tenderness, Supple, No stridor. No meningismus.  Lymphatic: No lymphadenopathy noted.   Cardiovascular: Regular rate and rhythm without murmur rub or gallop.  Thorax & Lungs: Clear breath sounds bilaterally without wheezes, rhonchi or rales. There is no chest wall tenderness.   Abdomen: Soft, non-distended. Diffuse upper quadrant abdominal tenderness. There is no rebound or guarding. No organomegaly is appreciated. Bowel sounds are normal.  Skin: Normal without rash.   Back: No CVA or spinal tenderness.   Extremities: Intact distal pulses, No edema, No tenderness, No cyanosis, No clubbing. Capillary refill is less than 2 seconds.  Musculoskeletal: Good range of motion in all major joints. No tenderness to palpation or major deformities noted.   Neurologic: Alert & oriented x 3, Normal motor function, Normal sensory function, No focal deficits noted. Reflexes are normal.  Psychiatric: Affect normal, Judgment normal, Mood normal. There is no suicidal ideation or patient reported hallucinations.        DIAGNOSTIC STUDIES    Labs:   Labs Reviewed   CBC WITH DIFFERENTIAL - Abnormal; Notable for the following components:       Result Value    WBC 15.2 (*)     RBC 2.92 (*)     Hemoglobin 9.2 (*)     Hematocrit 28.9 (*)     MCV 99.0 (*)     MCHC 31.8 (*)     RDW 59.7 (*)     Neutrophils-Polys 91.60 (*)     Lymphocytes 2.30 (*)     Neutrophils (Absolute) 13.87 (*)     Lymphs (Absolute) 0.35 (*)     All other components within normal limits    Narrative:     Indicate which anticoagulants the patient is on:->UNKNOWN   COMP METABOLIC PANEL - Abnormal; Notable for the following components:    Potassium 2.4 (*)     Chloride 113 (*)     Co2 7 (*)     Calcium 8.2 (*)     AST(SGOT) 9 (*)     Alkaline Phosphatase 122 (*)     All other components within normal limits    Narrative:     Indicate which  anticoagulants the patient is on:->UNKNOWN   PROTHROMBIN TIME - Abnormal; Notable for the following components:    PT 15.2 (*)     INR 1.19 (*)     All other components within normal limits    Narrative:     Indicate which anticoagulants the patient is on:->UNKNOWN   COD (ADULT)   LIPASE    Narrative:     Indicate which anticoagulants the patient is on:->UNKNOWN   APTT    Narrative:     Indicate which anticoagulants the patient is on:->UNKNOWN   ESTIMATED GFR    Narrative:     Indicate which anticoagulants the patient is on:->UNKNOWN   URINALYSIS,CULTURE IF INDICATED   CORTISOL   LACTIC ACID   LACTIC ACID   LACTIC ACID   CBC WITH DIFFERENTIAL   URINE CULTURE(NEW)   BLOOD CULTURE   BLOOD CULTURE   MAGNESIUM   PHOSPHORUS   PROCALCITONIN        EKG:   I have independently interpreted this EKG  Results for orders placed or performed during the hospital encounter of 24   EKG   Result Value Ref Range    Report       [facility] Emergency Dept.    Test Date:  2024  Pt Name:    STEFFANIE ELLISON                Department: ER  MRN:        [id number]                      Room:       [facility]  Gender:     Female                       Technician: [id number]  :        1970                   Requested By:ER TRIAGE PROTOCOL  Order #:    [id number]                    Reading MD: SUMA SWAN MD    Measurements  Intervals                                Axis  Rate:       109                          P:          85  AL:         156                          QRS:        82  QRSD:       126                          T:          73  QT:         495  QTc:        667    Interpretive Statements  Sinus tachycardia  Right atrial enlargement  Nonspecific intraventricular conduction delay  Compared to ECG 2024 07:43:12  Intraventricular conduction delay now present  Sinus rhythm no longer present  Electronically Signed On 2024 14:37:02 PDT by SUMA SWAN MD           Radiology:   The attending emergency  physician has independently interpreted the diagnostic imaging associated with this visit and am waiting the final reading from the radiologist.   Preliminary interpretation is a follows: No mass seen    Radiologist interpretation:   DX-CHEST-PORTABLE (1 VIEW)   Final Result         1. No acute cardiopulmonary abnormalities are identified.      CT-ABDOMEN-PELVIS WITH   Final Result      1.  Nonobstructing left renal stones.   2.  Mild bilateral hydronephrosis.   3.  New borderline enlarged pelvic lymph nodes are nonspecific.   4.  Moderate colonic stool may represent constipation.   5.  Biliary dilatation again noted in this postcholecystectomy patient.          COURSE & MEDICAL DECISION MAKING     ED Observation Status? No; Patient does not meet criteria for ED Observation.     INITIAL ASSESSMENT, COURSE AND PLAN  Care Narrative:     12:25 PM - Patient was seen and evaluated at bedside. Patient presents to the ED for abdominal pain that started three days ago. Currently in the ED, her blood pressure is 104/70. After my exam, I discussed with the patient the plan of care, which includes treating the patient with medication for their symptoms, as well as obtaining lab work and imaging for further evaluation. Patient understands and verbalizes agreement to plan of care. Ordered EKG, COD, CBC with diff, CMP, Lipase, Prothrombin Time, APTT to evaluate.       1:03 PM - Patient was reevaluated at bedside. Patient's blood pressure has dropped to 81/52. She reports that her blood pressure is always around the mid 80s. She will be treated with NS infusion 1000 mL, Zofran 4 mg, and morphine 4 mg for her symptoms.     1:53 PM - Critical lab: Potassium 2.4. WBC is 15.2 but no source of infection yet identified. Patient will be treated with potassium chloride.     2:30 PM - Blood pressure is 85/52. Patient says she is often in the mid 80s on her BP. Will give fluids to attempt improvement. Will correct electrolytes.    2:39 PM -  Patient was reevaluated at bedside. Discussed lab and radiology results with the patient, and informed them of the plan for admission. Patient understands and verbalizes agreement to plan of care.      2:41 PM - Paged Intensivist    HYDRATION: Based on the patient's presentation of Hypotension the patient was given IV fluids. IV Hydration was used because oral hydration was not adequate alone. Upon recheck following hydration, the patient was improved.      CRITICAL CARE  The very real possibility of a deterioration of this patient's condition required the highest level of my preparedness for sudden, emergent intervention.  I provided critical care services, which included medication orders, frequent reevaluations of the patient's condition and response to treatment, ordering and reviewing test results, and discussing the case with various consultants.  The critical care time associated with the care of the patient was 45 minutes. Review chart for interventions. This time is exclusive of any other billable procedures.        DISPOSITION AND DISCUSSIONS  I have discussed management of the patient with the following physicians and HALIMA's:  Intensivist     Discussion of management with other QHP or appropriate source(s): None     Barriers to care at this time, including but not limited to:  None .     Patient will be hospitalized by Dr Gonda in critical condition.     FINAL DIAGNOSIS  1. Metabolic acidosis    2. Hypokalemia    3. Hypotension, unspecified hypotension type    4.      The critical care time associated with the care of the patient was 45 minutes.     Stoney MOLINA (Rbuy), am scribing for, and in the presence of, Avery Muse M.D..    Electronically signed by: Stoney Ro (Ruby), 3/13/2024    Avery MOLINA M.D. personally performed the services described in this documentation, as scribed by Stoney Ro in my presence, and it is both accurate and complete.      The note accurately  reflects work and decisions made by me.  Avery Muse M.D.  3/13/2024  4:00 PM

## 2024-03-13 NOTE — ASSESSMENT & PLAN NOTE
3/16/2024  Came with leukocytosis 16  Procalcitonin is elevated  No fever or chills  No source of infection however we will start with cefepime and Flagyl and de-escalate antibiotics as appropriate,  Cultures pending  IV fluids

## 2024-03-13 NOTE — H&P
Hospital Medicine History & Physical Note    Date of Service  3/13/2024    Primary Care Physician  Stormy Gotti M.D.    Consultants  None    Code Status  Full Code    Chief Complaint  Chief Complaint   Patient presents with    Abdominal Pain     Pt BIBA. States they have been having abd pain x 3 weeks. Blood in stool last thursday Pt had a fall today, (-) LOC (-) trauma       History of Presenting Illness    55-year-old female with complicated medical history of rheumatic arthritis, chronic pain, depression and extensive GI history who presented 3/13 with abdominal pain.  Started on the day of admission after eating her lunch was started having severe abdominal pain also nausea, no fever or chills no chest pain and patient denied any diarrhea or constipation.  Recently in February patient was admitted to the hospital for abdominal pain and treated for constipation, patient is on chronic opioid, denies any using NSAID and patient states she is taking stool softener daily.  Denies any history of alcoholism and she quit smoking couple months ago.  On admission patient was a dry, she had low blood pressure 77/51, labs showed leukocytosis 15.2 with hemoglobin 9.2, also potassium 2.4 with bicarb 7 with creatinine 0.9, CT scan of her abdomen was done with contrast and showed moderate colonic stool may represent constipation.  IV fluid was initiated, procalcitonin was elevated, start antibiotics after getting blood and urine culture.  Intensivist was consulted to evaluate the patient for IMCU admission.    I discussed the plan of care with patient, bedside RN, and ed doc .    Review of Systems  Review of Systems   Constitutional:  Positive for weight loss. Negative for chills and fever.   HENT:  Negative for ear pain, hearing loss and tinnitus.    Eyes:  Negative for blurred vision, double vision and photophobia.   Respiratory:  Negative for cough and hemoptysis.    Cardiovascular:  Negative for chest pain,  palpitations, orthopnea and claudication.   Gastrointestinal:  Positive for abdominal pain and nausea. Negative for blood in stool, constipation, diarrhea, melena and vomiting.   Genitourinary:  Negative for dysuria, frequency and urgency.   Musculoskeletal:  Negative for myalgias and neck pain.   Skin:  Negative for rash.   Neurological:  Negative for dizziness, speech change and weakness.       Past Medical History   has a past medical history of Acute renal failure (ARF) ([facility]), Acute renal failure (HCC) (10/06/2011), Allergy, Anemia, Anxiety, Arthritis, ASTHMA, Blood transfusion without reported diagnosis, Bowel habit changes, Bronchitis (last approx 2018), Chronic pain (04/24/2020), Dental disorder, Depression, GERD (gastroesophageal reflux disease), GIB (gastrointestinal bleeding), Head ache, Heart burn, Hiatal hernia, Hiatus hernia syndrome, History of pancreatitis, Hypokalemia, Hyponatremia, Idiopathic chronic pancreatitis ([facility]), Indigestion, Intractable nausea and vomiting, Kidney disease, Leukocytosis (10/08/2011), Pain, Pneumonia (10/2019), PONV (postoperative nausea and vomiting), Psychiatric problem, Rheumatoid aortitis, Rheumatoid arthritis(714.0) (2003), Severe protein-calorie malnutrition ([facility]), Small bowel obstruction ([facility]) (10/06/2011), SMAS (superior mesenteric artery syndrome) (HCC), Substance abuse ([facility]), UTI (urinary tract infection) (05/25/2023), and Vitamin B12 deficiency neuropathy ([facility]).    Surgical History   has a past surgical history that includes abdominal abscess drainage (9/27/2011); toe fusion (Right, 5/27/2015); bone spur excision (5/27/2015); hammertoe correction (5/27/2015); foot reconstruction rheumatic (Left, 7/29/2015); arthrodesis (Right, 5/6/2016); fusion, pip joint, toe (Right, 8/29/2016); bone graft (Right, 8/29/2016); irrigation & debridement ortho (Right, 9/11/2016); finger amputation (Right, 9/14/2016); finger arthroplasty (Right, 4/17/2017); finger arthroplasty (Right,  6/5/2017); finger amputation (6/5/2017); pr exploratory of abdomen (N/A, 10/4/2019); tonsillectomy (1982); primary c section (1991); repeat c section (1999); repeat c section (2005); repeat c section (2008); appendectomy (2011); nicholas by laparoscopy (9/27/2011); wrist exploration (Left, 1980's); colonoscopy (2018); dental extraction(s) (2014); pr endoscopic us exam, esoph (4/29/2020); gastroscopy-endo (4/29/2020); egd with asp/bx (4/29/2020); pr upper gi endoscopy,diagnosis (N/A, 9/9/2022); egd w/endoscopic ultrasound (N/A, 9/9/2022); pr place percut gastrostomy tube (N/A, 6/12/2023); pr upper gi endoscopy,diagnosis (6/12/2023); and pr upper gi endoscopy,diagnosis (N/A, 8/16/2023).     Family History  family history includes Cancer in her mother; Heart Disease in her father and mother; Hypertension in her father and mother.   Family history reviewed with patient. There is no family history that is pertinent to the chief complaint.     Social History   reports that she quit smoking about 3 months ago. Her smoking use included cigarettes. She has a 15 pack-year smoking history. She has never been exposed to tobacco smoke. She has never used smokeless tobacco. She reports that she does not drink alcohol and does not use drugs.    Allergies  Allergies   Allergen Reactions    Penicillins Anaphylaxis and Hives     Tolerates cephalosporins; reports throat swelling with PCN    Aripiprazole Nausea     Spasms, shaking      Nitrous Oxide Vomiting       Medications  Prior to Admission Medications   Prescriptions Last Dose Informant Patient Reported? Taking?   PARoxetine (PAXIL) 30 MG Tab 3/12/2024 at PM Patient No Yes   Sig: TAKE 2 TABLETS(60 MG) BY MOUTH EVERY EVENING   Patient taking differently: Take 60 mg by mouth at bedtime.   QUEtiapine (SEROQUEL) 100 MG Tab 3/12/2024 at PM Patient No Yes   Sig: Take 1 Tablet by mouth every evening.   acetaminophen (TYLENOL) 325 MG Tab PRN at PRN Patient Yes No   Sig: Take 325-650 mg by  mouth every 6 hours as needed. Indications: Pain   metoclopramide (REGLAN) 10 MG Tab 3/12/2024 at PRN Patient No Yes   Sig: Take 1 Tablet by mouth 4 times a day as needed (nausea and vomitng).   omeprazole (PRILOSEC) 40 MG delayed-release capsule 3/13/2024 at 1000 Patient Yes Yes   Sig: Take 40 mg by mouth 2 times a day.   ondansetron (ZOFRAN ODT) 4 MG TABLET DISPERSIBLE LAST WEEK at PRN Patient No No   Sig: Take 1 Tablet by mouth every 6 hours as needed for Nausea/Vomiting.   oxyCODONE-acetaminophen (PERCOCET)  MG Tab FUTURE RX at FUTURE RX Patient No No   Sig: Take 1 Tablet by mouth every four hours as needed for Severe Pain for up to 30 days. Indications: Pain   oxyCODONE-acetaminophen (PERCOCET-10)  MG Tab FUTURE RX at FUTURE RX Patient No No   Sig: Take 1 Tablet by mouth every four hours as needed for Severe Pain for up to 30 days. Indications: Pain   oxyCODONE-acetaminophen (PERCOCET-10)  MG Tab 3/13/2024 at 1000 Patient No Yes   Sig: Take 1 Tablet by mouth every four hours as needed for Severe Pain for up to 30 days. Indications: Pain      Facility-Administered Medications: None       Physical Exam  Temp:  [37.2 °C (99 °F)] 37.2 °C (99 °F)  Pulse:  [102-116] 116  Resp:  [12-20] 12  BP: ()/(51-70) 91/56  SpO2:  [95 %-98 %] 98 %  Blood Pressure: (!) 82/52   Temperature: 37.2 °C (99 °F)   Pulse: (!) 105   Respiration: 16   Pulse Oximetry: 98 %       Physical Exam  Constitutional:       Appearance: She is not ill-appearing.      Comments: Cachectic appearance   HENT:      Head: Normocephalic and atraumatic.   Eyes:      General: No scleral icterus.  Cardiovascular:      Rate and Rhythm: Normal rate.      Heart sounds: No murmur heard.  Pulmonary:      Effort: No respiratory distress.      Breath sounds: No wheezing.   Abdominal:      General: There is no distension.      Palpations: Abdomen is soft.      Tenderness: There is abdominal tenderness. There is no guarding.   Musculoskeletal:     "     General: Signs of injury present.      Right lower leg: No edema.      Left lower leg: No edema.      Comments: Finger amputation on the right hand   Skin:     General: Skin is dry.      Coloration: Skin is pale. Skin is not jaundiced.      Findings: No bruising.   Neurological:      General: No focal deficit present.      Mental Status: She is oriented to person, place, and time. Mental status is at baseline.      Cranial Nerves: No cranial nerve deficit.      Sensory: No sensory deficit.      Motor: No weakness.         Laboratory:  Recent Labs     03/13/24  1354   WBC 15.2*   RBC 2.92*   HEMOGLOBIN 9.2*   HEMATOCRIT 28.9*   MCV 99.0*   MCH 31.5   MCHC 31.8*   RDW 59.7*   PLATELETCT 291   MPV 9.6     Recent Labs     03/13/24  1238   SODIUM 135   POTASSIUM 2.4*   CHLORIDE 113*   CO2 7*   GLUCOSE 99   BUN 17   CREATININE 0.95   CALCIUM 8.2*     Recent Labs     03/13/24  1238   ALTSGPT <5   ASTSGOT 9*   ALKPHOSPHAT 122*   TBILIRUBIN <0.2   LIPASE 66   GLUCOSE 99     Recent Labs     03/13/24  1238   APTT 26.9   INR 1.19*     No results for input(s): \"NTPROBNP\" in the last 72 hours.      No results for input(s): \"TROPONINT\" in the last 72 hours.    Imaging:  DX-CHEST-PORTABLE (1 VIEW)   Final Result         1. No acute cardiopulmonary abnormalities are identified.      CT-ABDOMEN-PELVIS WITH   Final Result      1.  Nonobstructing left renal stones.   2.  Mild bilateral hydronephrosis.   3.  New borderline enlarged pelvic lymph nodes are nonspecific.   4.  Moderate colonic stool may represent constipation.   5.  Biliary dilatation again noted in this postcholecystectomy patient.          X-Ray:  I have personally reviewed the images and compared with prior images.  EKG:  I have personally reviewed the images and compared with prior images.    Assessment/Plan:  Justification for Admission Status  I anticipate this patient will require at least two midnights for appropriate medical management, necessitating inpatient " admission because leukocytosis and infection    Patient will need a ICU (Adult and Pediatrics) bed on EMERGENCY service .  The need is secondary to constipation.    * SIRS (systemic inflammatory response syndrome) (HCC)- (present on admission)  Assessment & Plan  SIRS criteria identified on my evaluation include:  Tachycardia, with heart rate greater than 90 BPM and Leukocytosis, with WBC greater than 12,000  SIRS is non-infectious, the patient does not have sepsis  Chest x-ray did not show any signs of infiltration, denies any urinary symptoms  Blood cultures pending  Will start with cefepime and Flagyl, patient is allergic to penicillin        Constipation  Assessment & Plan  Came with abdominal pain and CT scan showed likely constipation  Patient was treated for constipation last month  Patient is on chronic opioid  Patient states she is taking stool softener at home   Scheduled MiraLAX  Follow-up with x-ray    Normal anion gap metabolic acidosis- (present on admission)  Assessment & Plan  Likely related to RTA  IV fluid and monitor with labs  Bicarb    Bipolar 1 disorder (HCC)- (present on admission)  Assessment & Plan  Continue home medication Seroquel    Hypotension- (present on admission)  Assessment & Plan  Patient has a chronic hypotension  However got worse likely related to vomiting  IV fluid  Close monitoring and start with pressors if needed  Start with antibiotics for possible sepsis and de-escalate antibiotic later    Severe protein-calorie malnutrition (HCC)- (present on admission)  Assessment & Plan  Cachectic appearance and muscles loss  Nutrition consult    DNR (do not resuscitate)- (present on admission)  Assessment & Plan  The goal of care was discussed in detail with the patient, discussed the CODE STATUS, patient does not want to be intubated, patient wants to be DNR/DNI, time 20 minutes.    History of rheumatoid arthritis- (present on admission)  Assessment & Plan  On chronic pain  medication  Follow-up with rheumatologist    Hypokalemia- (present on admission)  Assessment & Plan  Likely related to vomiting  Replace IV, monitor phosphorus and magnesium    Leukocytosis- (present on admission)  Assessment & Plan  Came with leukocytosis 16  Procalcitonin is elevated  No fever or chills  No source of infection however we will start with cefepime and Flagyl and de-escalate antibiotics later  IV fluid        VTE prophylaxis: SCDs/TEDs and enoxaparin ppx

## 2024-03-13 NOTE — ED NOTES
Break rn note: left EJ started, blood sent to lab.   Ivf restarted. Tele rn here to take pt to floor.

## 2024-03-13 NOTE — ASSESSMENT & PLAN NOTE
3/16/2024  SIRS criteria identified on my evaluation include:  Tachycardia, with heart rate greater than 90 BPM and Leukocytosis, with WBC greater than 12,000  SIRS is non-infectious, the patient does not have sepsis  Chest x-ray did not show any signs of infiltration, denies any urinary symptoms  Blood cultures pending  Will start with cefepime and Flagyl, patient is allergic to penicillin  Possible intra-abdominal pathology

## 2024-03-13 NOTE — ASSESSMENT & PLAN NOTE
The goal of care was discussed in detail with the patient, discussed the CODE STATUS, patient does not want to be intubated, patient wants to be DNR/DNI, time 20 minutes.

## 2024-03-14 LAB
ALBUMIN SERPL BCP-MCNC: 2.7 G/DL (ref 3.2–4.9)
ALBUMIN/GLOB SERPL: 0.9 G/DL
ALP SERPL-CCNC: 104 U/L (ref 30–99)
ALT SERPL-CCNC: <5 U/L (ref 2–50)
ANION GAP SERPL CALC-SCNC: 14 MMOL/L (ref 7–16)
AST SERPL-CCNC: 8 U/L (ref 12–45)
BASOPHILS # BLD AUTO: 0.3 % (ref 0–1.8)
BASOPHILS # BLD: 0.05 K/UL (ref 0–0.12)
BILIRUB SERPL-MCNC: <0.2 MG/DL (ref 0.1–1.5)
BUN SERPL-MCNC: 18 MG/DL (ref 8–22)
CALCIUM ALBUM COR SERPL-MCNC: 8.4 MG/DL (ref 8.5–10.5)
CALCIUM SERPL-MCNC: 7.4 MG/DL (ref 8.5–10.5)
CHLORIDE SERPL-SCNC: 114 MMOL/L (ref 96–112)
CO2 SERPL-SCNC: 9 MMOL/L (ref 20–33)
CREAT SERPL-MCNC: 0.78 MG/DL (ref 0.5–1.4)
EOSINOPHIL # BLD AUTO: 0.15 K/UL (ref 0–0.51)
EOSINOPHIL NFR BLD: 0.8 % (ref 0–6.9)
ERYTHROCYTE [DISTWIDTH] IN BLOOD BY AUTOMATED COUNT: 60.3 FL (ref 35.9–50)
GFR SERPLBLD CREATININE-BSD FMLA CKD-EPI: 91 ML/MIN/1.73 M 2
GLOBULIN SER CALC-MCNC: 3.1 G/DL (ref 1.9–3.5)
GLUCOSE SERPL-MCNC: 97 MG/DL (ref 65–99)
HCT VFR BLD AUTO: 26 % (ref 37–47)
HGB BLD-MCNC: 8.4 G/DL (ref 12–16)
IMM GRANULOCYTES # BLD AUTO: 0.16 K/UL (ref 0–0.11)
IMM GRANULOCYTES NFR BLD AUTO: 0.9 % (ref 0–0.9)
LYMPHOCYTES # BLD AUTO: 0.8 K/UL (ref 1–4.8)
LYMPHOCYTES NFR BLD: 4.4 % (ref 22–41)
MAGNESIUM SERPL-MCNC: 2.5 MG/DL (ref 1.5–2.5)
MCH RBC QN AUTO: 31.5 PG (ref 27–33)
MCHC RBC AUTO-ENTMCNC: 32.3 G/DL (ref 32.2–35.5)
MCV RBC AUTO: 97.4 FL (ref 81.4–97.8)
MONOCYTES # BLD AUTO: 0.87 K/UL (ref 0–0.85)
MONOCYTES NFR BLD AUTO: 4.8 % (ref 0–13.4)
NEUTROPHILS # BLD AUTO: 16.13 K/UL (ref 1.82–7.42)
NEUTROPHILS NFR BLD: 88.8 % (ref 44–72)
NRBC # BLD AUTO: 0 K/UL
NRBC BLD-RTO: 0 /100 WBC (ref 0–0.2)
PHOSPHATE SERPL-MCNC: 7.3 MG/DL (ref 2.5–4.5)
PLATELET # BLD AUTO: 288 K/UL (ref 164–446)
PMV BLD AUTO: 10.1 FL (ref 9–12.9)
POTASSIUM SERPL-SCNC: 3.5 MMOL/L (ref 3.6–5.5)
PROCALCITONIN SERPL-MCNC: 16.3 NG/ML
PROT SERPL-MCNC: 5.8 G/DL (ref 6–8.2)
RBC # BLD AUTO: 2.67 M/UL (ref 4.2–5.4)
SODIUM SERPL-SCNC: 137 MMOL/L (ref 135–145)
WBC # BLD AUTO: 18.2 K/UL (ref 4.8–10.8)

## 2024-03-14 PROCEDURE — A9270 NON-COVERED ITEM OR SERVICE: HCPCS | Performed by: STUDENT IN AN ORGANIZED HEALTH CARE EDUCATION/TRAINING PROGRAM

## 2024-03-14 PROCEDURE — 700111 HCHG RX REV CODE 636 W/ 250 OVERRIDE (IP): Mod: JZ | Performed by: INTERNAL MEDICINE

## 2024-03-14 PROCEDURE — A9270 NON-COVERED ITEM OR SERVICE: HCPCS | Performed by: INTERNAL MEDICINE

## 2024-03-14 PROCEDURE — 83735 ASSAY OF MAGNESIUM: CPT

## 2024-03-14 PROCEDURE — 80053 COMPREHEN METABOLIC PANEL: CPT

## 2024-03-14 PROCEDURE — 700102 HCHG RX REV CODE 250 W/ 637 OVERRIDE(OP): Performed by: HOSPITALIST

## 2024-03-14 PROCEDURE — 99233 SBSQ HOSP IP/OBS HIGH 50: CPT | Performed by: HOSPITALIST

## 2024-03-14 PROCEDURE — 700111 HCHG RX REV CODE 636 W/ 250 OVERRIDE (IP): Mod: JZ | Performed by: HOSPITALIST

## 2024-03-14 PROCEDURE — A9270 NON-COVERED ITEM OR SERVICE: HCPCS | Performed by: HOSPITALIST

## 2024-03-14 PROCEDURE — 770020 HCHG ROOM/CARE - TELE (206)

## 2024-03-14 PROCEDURE — 85025 COMPLETE CBC W/AUTO DIFF WBC: CPT

## 2024-03-14 PROCEDURE — 700102 HCHG RX REV CODE 250 W/ 637 OVERRIDE(OP): Performed by: STUDENT IN AN ORGANIZED HEALTH CARE EDUCATION/TRAINING PROGRAM

## 2024-03-14 PROCEDURE — 84145 PROCALCITONIN (PCT): CPT

## 2024-03-14 PROCEDURE — 700102 HCHG RX REV CODE 250 W/ 637 OVERRIDE(OP): Performed by: INTERNAL MEDICINE

## 2024-03-14 PROCEDURE — 700105 HCHG RX REV CODE 258: Performed by: INTERNAL MEDICINE

## 2024-03-14 PROCEDURE — 700105 HCHG RX REV CODE 258: Performed by: HOSPITALIST

## 2024-03-14 PROCEDURE — 84100 ASSAY OF PHOSPHORUS: CPT

## 2024-03-14 RX ORDER — KETOROLAC TROMETHAMINE 15 MG/ML
15 INJECTION, SOLUTION INTRAMUSCULAR; INTRAVENOUS EVERY 6 HOURS PRN
Status: DISPENSED | OUTPATIENT
Start: 2024-03-14 | End: 2024-03-15

## 2024-03-14 RX ORDER — METHYLPREDNISOLONE SODIUM SUCCINATE 40 MG/ML
40 INJECTION, POWDER, LYOPHILIZED, FOR SOLUTION INTRAMUSCULAR; INTRAVENOUS EVERY 12 HOURS
Status: DISCONTINUED | OUTPATIENT
Start: 2024-03-14 | End: 2024-03-15

## 2024-03-14 RX ORDER — POLYETHYLENE GLYCOL 3350 17 G/17G
1 POWDER, FOR SOLUTION ORAL DAILY
Status: DISCONTINUED | OUTPATIENT
Start: 2024-03-14 | End: 2024-03-15

## 2024-03-14 RX ORDER — ACETAMINOPHEN 325 MG/1
650 TABLET ORAL EVERY 4 HOURS PRN
Status: DISCONTINUED | OUTPATIENT
Start: 2024-03-14 | End: 2024-03-17 | Stop reason: HOSPADM

## 2024-03-14 RX ORDER — SODIUM CHLORIDE, SODIUM LACTATE, POTASSIUM CHLORIDE, CALCIUM CHLORIDE 600; 310; 30; 20 MG/100ML; MG/100ML; MG/100ML; MG/100ML
INJECTION, SOLUTION INTRAVENOUS CONTINUOUS
Status: DISCONTINUED | OUTPATIENT
Start: 2024-03-14 | End: 2024-03-17 | Stop reason: HOSPADM

## 2024-03-14 RX ORDER — OXYCODONE HYDROCHLORIDE 5 MG/1
5 TABLET ORAL EVERY 4 HOURS PRN
Status: DISCONTINUED | OUTPATIENT
Start: 2024-03-14 | End: 2024-03-15

## 2024-03-14 RX ORDER — ENOXAPARIN SODIUM 100 MG/ML
30 INJECTION SUBCUTANEOUS DAILY
Status: DISCONTINUED | OUTPATIENT
Start: 2024-03-14 | End: 2024-03-17 | Stop reason: HOSPADM

## 2024-03-14 RX ADMIN — OXYCODONE HYDROCHLORIDE AND ACETAMINOPHEN 1 TABLET: 10; 325 TABLET ORAL at 00:07

## 2024-03-14 RX ADMIN — SODIUM BICARBONATE 650 MG: 650 TABLET ORAL at 13:25

## 2024-03-14 RX ADMIN — OMEPRAZOLE 40 MG: 20 CAPSULE, DELAYED RELEASE ORAL at 05:15

## 2024-03-14 RX ADMIN — SODIUM CHLORIDE, POTASSIUM CHLORIDE, SODIUM LACTATE AND CALCIUM CHLORIDE: 600; 310; 30; 20 INJECTION, SOLUTION INTRAVENOUS at 18:08

## 2024-03-14 RX ADMIN — METHYLPREDNISOLONE SODIUM SUCCINATE 40 MG: 40 INJECTION, POWDER, FOR SOLUTION INTRAMUSCULAR; INTRAVENOUS at 18:06

## 2024-03-14 RX ADMIN — METRONIDAZOLE 500 MG: 500 INJECTION, SOLUTION INTRAVENOUS at 05:17

## 2024-03-14 RX ADMIN — SODIUM CHLORIDE: 9 INJECTION, SOLUTION INTRAVENOUS at 16:38

## 2024-03-14 RX ADMIN — KETOROLAC TROMETHAMINE 15 MG: 15 INJECTION, SOLUTION INTRAMUSCULAR; INTRAVENOUS at 16:39

## 2024-03-14 RX ADMIN — OMEPRAZOLE 40 MG: 20 CAPSULE, DELAYED RELEASE ORAL at 16:38

## 2024-03-14 RX ADMIN — ACETAMINOPHEN 650 MG: 325 TABLET, FILM COATED ORAL at 16:38

## 2024-03-14 RX ADMIN — SODIUM BICARBONATE 650 MG: 650 TABLET ORAL at 20:04

## 2024-03-14 RX ADMIN — PROCHLORPERAZINE EDISYLATE 10 MG: 5 INJECTION INTRAMUSCULAR; INTRAVENOUS at 00:12

## 2024-03-14 RX ADMIN — MIDODRINE HYDROCHLORIDE 5 MG: 5 TABLET ORAL at 07:39

## 2024-03-14 RX ADMIN — OXYCODONE 5 MG: 5 TABLET ORAL at 13:25

## 2024-03-14 RX ADMIN — KETOROLAC TROMETHAMINE 15 MG: 15 INJECTION, SOLUTION INTRAMUSCULAR; INTRAVENOUS at 09:51

## 2024-03-14 RX ADMIN — OXYCODONE 5 MG: 5 TABLET ORAL at 18:05

## 2024-03-14 RX ADMIN — ENOXAPARIN SODIUM 30 MG: 100 INJECTION SUBCUTANEOUS at 18:06

## 2024-03-14 RX ADMIN — METHYLPREDNISOLONE SODIUM SUCCINATE 40 MG: 40 INJECTION, POWDER, FOR SOLUTION INTRAMUSCULAR; INTRAVENOUS at 09:52

## 2024-03-14 RX ADMIN — METRONIDAZOLE 500 MG: 500 INJECTION, SOLUTION INTRAVENOUS at 18:16

## 2024-03-14 RX ADMIN — QUETIAPINE FUMARATE 100 MG: 100 TABLET ORAL at 20:04

## 2024-03-14 RX ADMIN — POLYETHYLENE GLYCOL 3350 1 PACKET: 17 POWDER, FOR SOLUTION ORAL at 09:52

## 2024-03-14 RX ADMIN — MIDODRINE HYDROCHLORIDE 5 MG: 5 TABLET ORAL at 09:52

## 2024-03-14 RX ADMIN — SODIUM CHLORIDE: 9 INJECTION, SOLUTION INTRAVENOUS at 01:29

## 2024-03-14 RX ADMIN — DOCUSATE SODIUM 50 MG AND SENNOSIDES 8.6 MG 2 TABLET: 8.6; 5 TABLET, FILM COATED ORAL at 16:41

## 2024-03-14 RX ADMIN — SODIUM BICARBONATE 650 MG: 650 TABLET ORAL at 07:38

## 2024-03-14 RX ADMIN — PAROXETINE 60 MG: 30 TABLET, FILM COATED ORAL at 20:04

## 2024-03-14 RX ADMIN — OXYCODONE 5 MG: 5 TABLET ORAL at 23:04

## 2024-03-14 RX ADMIN — OXYCODONE HYDROCHLORIDE AND ACETAMINOPHEN 1 TABLET: 10; 325 TABLET ORAL at 04:02

## 2024-03-14 RX ADMIN — OXYCODONE 5 MG: 5 TABLET ORAL at 07:39

## 2024-03-14 RX ADMIN — CEFEPIME 2 G: 2 INJECTION, POWDER, FOR SOLUTION INTRAVENOUS at 23:45

## 2024-03-14 ASSESSMENT — ENCOUNTER SYMPTOMS
COUGH: 0
DIZZINESS: 0
CHILLS: 0
HEARTBURN: 0
BRUISES/BLEEDS EASILY: 0
VOMITING: 0
NERVOUS/ANXIOUS: 1
HEADACHES: 0
NECK PAIN: 0
WEAKNESS: 0
RESPIRATORY NEGATIVE: 1
FEVER: 0
POLYDIPSIA: 0
NAUSEA: 1
EYES NEGATIVE: 1
NEUROLOGICAL NEGATIVE: 1
PALPITATIONS: 0
FOCAL WEAKNESS: 0
DEPRESSION: 0
CARDIOVASCULAR NEGATIVE: 1
ABDOMINAL PAIN: 1
MYALGIAS: 1
FALLS: 1
BACK PAIN: 1

## 2024-03-14 ASSESSMENT — PAIN DESCRIPTION - PAIN TYPE
TYPE: ACUTE PAIN

## 2024-03-14 ASSESSMENT — FIBROSIS 4 INDEX: FIB4 SCORE: 0.67

## 2024-03-14 NOTE — PROGRESS NOTES
Patient arrived to T607, bed scale weight obtained, connected to central monitor, skin check completed, CHG bath given, patient oriented to room and call bell.

## 2024-03-14 NOTE — PROGRESS NOTES
Patient transferred to T815 with central line in place due to lack of peripheral access. Leadership aware.

## 2024-03-14 NOTE — PROGRESS NOTES
[facility] Medicine Daily Progress Note    Date of Service  3/14/2024    Chief Complaint  Mary Martinez is a 53 y.o. female admitted 3/13/2024 with abdominal pain, nausea, fall, brought in by ambulance    Hospital Course  Mary Martinez is a 54-year-old female with a complicated medical history including rheumatoid arthritis, chronically on Enbrel, chronic pain syndrome, history of depression, history of extensive GI pathology and history, presenting on 3/13 with abdominal pain, onset was fairly sudden after eating lunch, nausea, abdominal pain persisted, the patient denies fevers or chills or any additional pain, she states she has daily bowel movements, did not feel constipated, patient was recently admitted for similar presentation where the patient was diagnosed with opioid-induced constipation, the patient reports taking stool softeners daily, the patient has a history of alcoholism, and tobacco abuse, she quit several months ago, on admission the patient was felt to be dehydrated, had a low blood pressure with a blood pressure of 77/51, identified to have leukocytosis, a potassium of 2.4, bicarb of 7 and a creatinine of 0.9, indicating metabolic acidosis, the patient was undergoing CT scanning of her abdomen showing moderate colonic stool, no other major significant findings were seen, the patient was fluid resuscitated, and started on empiric antibiotics given her presentation,  The patient had unremarkable urine analysis, significant elevated procalcitonin, cultures were taken and so far negative including blood culture, urine culture,  Chest x-ray showed no acute pathology,  CT abdomen showed nonobstructing left renal stones, mild bilateral hydronephrosis, new borderline enlarged pelvic lymph nodes are nonspecific, moderate colonic stool, biliary dilatation again noted in this postcholecystectomy patient.    Interval Problem Update  Patient seen and examined today.  Data, Medication data  reviewed.  Case discussed with nursing as available.  Plan of Care reviewed with patient and notified of changes.  3/14 the patient currently afebrile, heart rate in the 90s, respiration unlabored, the patient is on room air satting in the mid 90s, blood pressure is improved into the 90s over 50s, the patient complains of upper abdominal pain, she also complains of generalized joint aches, feels she has a rheumatoid flare,  The patient feels she can eat, she denies current fevers or chills,  Medication regimen is adjusted accordingly    I have discussed this patient's plan of care and discharge plan at IDT rounds today with Case Management, Nursing, Nursing leadership, and other members of the IDT team.    Consultants/Specialty  critical care    Code Status  DNAR/DNI    Disposition  The patient is not medically cleared for discharge to home or a post-acute facility.  Anticipate discharge to: home with close outpatient follow-up    I have placed the appropriate orders for post-discharge needs.    Review of Systems  Review of Systems   Constitutional:  Positive for malaise/fatigue. Negative for chills and fever.   HENT: Negative.     Eyes: Negative.    Respiratory: Negative.  Negative for cough.    Cardiovascular: Negative.  Negative for chest pain, palpitations and leg swelling.   Gastrointestinal:  Positive for abdominal pain and nausea. Negative for heartburn and vomiting.   Genitourinary: Negative.  Negative for dysuria and frequency.   Musculoskeletal:  Positive for back pain, falls, joint pain and myalgias. Negative for neck pain.   Skin: Negative.  Negative for itching and rash.   Neurological: Negative.  Negative for dizziness, focal weakness, weakness and headaches.   Endo/Heme/Allergies: Negative.  Negative for polydipsia. Does not bruise/bleed easily.   Psychiatric/Behavioral:  Negative for depression. The patient is nervous/anxious.         Physical Exam  Temp:  [36.8 °C (98.2 °F)-37.2 °C (99 °F)] 37.1 °C  (98.7 °F)  Pulse:  [102-119] 105  Resp:  [12-46] 15  BP: ()/(51-70) 86/54  SpO2:  [93 %-99 %] 93 %    Physical Exam  Vitals and nursing note reviewed.   Constitutional:       Appearance: She is well-developed. She is not diaphoretic.      Comments: Chronically ill-appearing, frail, 53-year-old   HENT:      Head: Normocephalic and atraumatic.      Nose: Nose normal.      Mouth/Throat:      Mouth: Mucous membranes are dry.   Eyes:      Conjunctiva/sclera: Conjunctivae normal.      Pupils: Pupils are equal, round, and reactive to light.   Neck:      Thyroid: No thyromegaly.      Vascular: No JVD.   Cardiovascular:      Rate and Rhythm: Regular rhythm. Tachycardia present.      Heart sounds: Normal heart sounds.      No friction rub. No gallop.   Pulmonary:      Effort: Pulmonary effort is normal.      Breath sounds: Normal breath sounds. No wheezing or rales.   Abdominal:      General: Bowel sounds are normal. There is no distension.      Palpations: Abdomen is soft. There is no mass.      Tenderness: There is abdominal tenderness. There is no guarding or rebound.   Musculoskeletal:         General: Swelling, tenderness and deformity present. Normal range of motion.      Cervical back: Normal range of motion and neck supple.   Lymphadenopathy:      Cervical: No cervical adenopathy.   Skin:     General: Skin is warm and dry.      Coloration: Skin is pale.   Neurological:      Mental Status: She is alert and oriented to person, place, and time.      Cranial Nerves: No cranial nerve deficit.   Psychiatric:         Behavior: Behavior normal.         Fluids    Intake/Output Summary (Last 24 hours) at 3/14/2024 0754  Last data filed at 3/14/2024 0600  Gross per 24 hour   Intake 2227.66 ml   Output 1200 ml   Net 1027.66 ml       Laboratory  Recent Labs     03/13/24  1354 03/13/24  1800 03/14/24  0342   WBC 15.2* 19.5* 18.2*   RBC 2.92* 2.74* 2.67*   HEMOGLOBIN 9.2* 8.7* 8.4*   HEMATOCRIT 28.9* 26.8* 26.0*   MCV 99.0*  97.8 97.4   MCH 31.5 31.8 31.5   MCHC 31.8* 32.5 32.3   RDW 59.7* 58.9* 60.3*   PLATELETCT 291 322 288   MPV 9.6 10.3 10.1     Recent Labs     03/13/24  1238 03/13/24  1800 03/14/24  0342   SODIUM 135 139 137   POTASSIUM 2.4* 3.0* 3.5*   CHLORIDE 113* 116* 114*   CO2 7* 9* 9*   GLUCOSE 99 97 97   BUN 17 17 18   CREATININE 0.95 0.78 0.78   CALCIUM 8.2* 7.7* 7.4*     Recent Labs     03/13/24  1238   APTT 26.9   INR 1.19*               Imaging  DX-CHEST-FOR LINE PLACEMENT Perform procedure in: Patient's Room   Final Result         1.  No acute cardiopulmonary disease.      DX-CHEST-PORTABLE (1 VIEW)   Final Result         1. No acute cardiopulmonary abnormalities are identified.      CT-ABDOMEN-PELVIS WITH   Final Result      1.  Nonobstructing left renal stones.   2.  Mild bilateral hydronephrosis.   3.  New borderline enlarged pelvic lymph nodes are nonspecific.   4.  Moderate colonic stool may represent constipation.   5.  Biliary dilatation again noted in this postcholecystectomy patient.           Assessment/Plan  * SIRS (systemic inflammatory response syndrome) (HCC)- (present on admission)  Assessment & Plan  SIRS criteria identified on my evaluation include:  Tachycardia, with heart rate greater than 90 BPM and Leukocytosis, with WBC greater than 12,000  SIRS is non-infectious, the patient does not have sepsis  Chest x-ray did not show any signs of infiltration, denies any urinary symptoms  Blood cultures pending  Will start with cefepime and Flagyl, patient is allergic to penicillin  Possible intra-abdominal pathology        Constipation  Assessment & Plan  Came with abdominal pain and CT scan showed likely constipation  Patient was treated for constipation last month  Patient is on chronic opioid  Patient states she is taking stool softener at home   Scheduled MiraLAX  Follow-up with x-ray    Normal anion gap metabolic acidosis- (present on admission)  Assessment & Plan  Likely related to RTA  IV fluid and  monitor with labs      Bipolar 1 disorder (HCC)- (present on admission)  Assessment & Plan  Continue home medication Seroquel    Hypotension- (present on admission)  Assessment & Plan  Patient has a chronic hypotension  However got worse likely related to vomiting  IV fluid  Close monitoring and start with pressors if needed  Start with antibiotics for possible sepsis and de-escalate antibiotic later    Severe protein-calorie malnutrition (HCC)- (present on admission)  Assessment & Plan  Cachectic appearance and muscles loss  Nutrition consult    DNR (do not resuscitate)- (present on admission)  Assessment & Plan  The goal of care was discussed in detail with the patient, discussed the CODE STATUS, patient does not want to be intubated, patient wants to be DNR/DNI, time 20 minutes.    History of rheumatoid arthritis- (present on admission)  Assessment & Plan  On chronic pain medication  Follow-up with rheumatologist  Usually is maintained with Enbrel  Start steroids for acute pain syndrome    Hypokalemia- (present on admission)  Assessment & Plan  Likely related to vomiting  Replace IV, monitor phosphorus and magnesium    Leukocytosis- (present on admission)  Assessment & Plan  Came with leukocytosis 16  Procalcitonin is elevated  No fever or chills  No source of infection however we will start with cefepime and Flagyl and de-escalate antibiotics as appropriate,  Cultures pending  IV fluids    Plan  3/14/2024  Maintain current supportive care with IV fluids, antibiotics, await culture reports  Treat for pain, commendation of Toradol,  Solu-Medrol for acute flare of joint pain with a history of rheumatoid arthritis  Blood pressure support  GI remedies including omeprazole, stool softeners, MiraLAX, maintain adequate stool output  Diet trial  See orders  A.m. labs  Patient is has a high medical complexity, complex decision making and is at high risk for complication, morbidity, and mortality.  My total time spent  caring for the patient on the day of the encounter was 58 minutes.   This does not include time spent on separately billable procedures/tests.    VTE prophylaxis:    enoxaparin ppx      I have performed a physical exam and reviewed and updated ROS and Plan today (3/14/2024). In review of yesterday's note (3/13/2024), there are no changes except as documented above.      Please note that this dictation was created using voice recognition software. I have made every reasonable attempt to correct obvious errors, but I expect that there are errors of grammar and possibly context that I did not discover before finalizing the note.

## 2024-03-14 NOTE — PROGRESS NOTES
4 Eyes Skin Assessment Completed by sita RN and Branden sena RN.    Head WDL  Ears WDL  Nose WDL  Mouth WDL  Neck WDL  Breast/Chest WDL  Shoulder Blades WDL  Spine WDL  (R) Arm/Elbow/Hand WDL  (L) Arm/Elbow/Hand WDL  Abdomen WDL  Groin   Scrotum/Coccyx/Buttocks Redness and Blanching,   (R) Leg WDL  (L) Leg WDL  (R) Heel/Foot/Toe Redness and Boggy  (L) Heel/Foot/Toe Redness and Boggy          Devices In Places Tele Box      Interventions In Place Sacral Mepilex    Possible Skin Injury Yes    Pictures Uploaded Into Epic Yes  Wound Consult Placed N/A  RN Wound Prevention Protocol Ordered Yes

## 2024-03-14 NOTE — CARE PLAN
The patient is Watcher - Medium risk of patient condition declining or worsening    Shift Goals  Clinical Goals: hemodynamic stability, pain management  Patient Goals: pain control  Family Goals: NAIN    Progress made toward(s) clinical / shift goals:    Problem: Pain - Standard  Goal: Alleviation of pain or a reduction in pain to the patient’s comfort goal  Outcome: Progressing     Problem: Hemodynamics  Goal: Patient's hemodynamics, fluid balance and neurologic status will be stable or improve  Outcome: Progressing     Problem: Urinary - Renal Perfusion  Goal: Ability to achieve and maintain adequate renal perfusion and functioning will improve  Outcome: Progressing     Problem: Respiratory  Goal: Patient will achieve/maintain optimum respiratory ventilation and gas exchange  Outcome: Progressing

## 2024-03-14 NOTE — PROGRESS NOTES
4 Eyes Skin Assessment Completed by LEANNE Hamilton and LEANNE Renee.    Head WDL  Ears Redness and Blanching  Nose WDL  Mouth WDL  Neck WDL  Breast/Chest WDL  Shoulder Blades WDL  Spine WDL  (R) Arm/Elbow/Hand Redness and Blanching  (L) Arm/Elbow/Hand Redness and Blanching  Abdomen WDL  Groin Excoriation  Scrotum/Coccyx/Buttocks Redness and Blanching Mepilex applied  (R) Leg WDL  (L) Leg WDL  (R) Heel/Foot/Toe Redness, Blanching, and Boggy scab on big toe  (L) Heel/Foot/Toe Redness, Blanching, and Boggy          Devices In Places ECG, Blood Pressure Cuff, Pulse Ox, and Central Line      Interventions In Place Heel Mepilex, Sacral Mepilex, Pillows, Q2 Turns, and Low Air Loss Mattress    Possible Skin Injury No    Pictures Uploaded Into Epic N/A  Wound Consult Placed N/A  RN Wound Prevention Protocol Ordered Yes

## 2024-03-14 NOTE — ED NOTES
Bedside report to receiving RN Eliu. Pt transferred via [facility] on cardiac monitor with 2 ACLs Rns. Chart and meds transported with pt.

## 2024-03-14 NOTE — PROCEDURES
Central Line Placement Procedure    Date: 03/13/24    Indication: vascular access, poor peripheral access, and need for frequent blood draws    Consent: The patient provided verbal consent for this procedure.    Procedure: The patient was positioned appropriately and the skin over the right internal jugular vein was prepped and draped in a sterile fashion. Local anesthesia was administered.  Using bedside ultrasound imaging, a large bore needle was used to identify the vein.  A guide wire was then inserted into the vein through the needle. A triple lumen catheter was then inserted into the vessel over the guide wire using the Seldinger technique.  All ports showed good, free flowing blood return and were flushed with saline solution.  The catheter was then securely fastened to the skin with sutures and covered with a sterile dressing.  A post procedure X-ray was ordered and is still pending at this time. Bedside ultrasound imaging guidance used for the procedure.    The patient tolerated the procedure well.    Complications: None

## 2024-03-14 NOTE — DIETARY
"Nutrition services: Day 1 of admit.  Mary Martinez is a 53 y.o. female with admitting DX of SIRS (systemic inflammatory response syndrome) (HCC) [R65.10]    Consult received for BMI<19. RD visited pt during pt transfer from [facility] to [facility] floor. Pt reports eating well PTA. Dislikes oral nutrition supplements and milkshakes, agreeable to Greek yogurt+fruit as a snack. Requesting soda all meals. Poor dentition noted. States  lb 2 yrs ago. Denies pain/difficulty w/ chewing/swallowing.    Assessment:  Height: 160 cm (5' 3\")  Weight: 44 kg (97 lb)  Body mass index is 17.18 kg/m²., BMI classification: Underweight  Diet/Intake: Regular; 50% average x2 meals    Evaluation:   PMHx includes arthritis, anxiety, chronic pain, dental disorder, depression, GERD, hiatal hernia, idiopathic chronic pancreatitis, kidney disease, severe protein-calorie malnutrition, superior mesenteria artery syndrome, substance abuse, lap nicholas, PEG (hx of; removed due to infection). Presents w/ severe abdominal pain after eating lunch. Current dx list includes SIRS, bipolar 1 disorder, constipation, hypokalemia, hypotension, severe protein-calorie malnutrition.  Labs: K+ 3.5, Ca 7.4, corrected Ca 8.4, alb 2.7, phos 7.3  MAR: solumedrol, midodrine, NS, prilosec, oxycodone, miralax, Na Bicarb  Skin: no documented wounds or peripheral edema  Last BM: PTA; abdomen firm, tender per flowsheets.  Nutrition-focused physical exam (NFPE): severe temporal wasting, somewhat sunken/hollowed orbitals, clavicle with mild/moderate wasting, buccals WNL   Wt hx per chart review:  03/13/24 44 kg (97 lb)  02/28/24 43.3 kg (95 lb 7.4 oz)  02/07/24 40.5 kg (89 lb 4.6 oz)  01/06/24 40.2 kg (88 lb 10 oz)  11/13/23 43 kg (94 lb 12.8 oz)  10/26/23 38.6 kg (85 lb 3.2 oz)  08/24/23 40.1 kg (88 lb 6.5 oz)  08/16/23 39.2 kg (86 lb 6.7 oz)    Malnutrition Risk: Pt dx with severe malnutrition in the context of chronic disease on 2/8/2024. Per chart hx, pt has gained " 6.5-8lb since then, no documented edema; appears to be resolving malnutrition. Pt currently meets 2 ASPEN criteria for moderate chronic malnutrition r/t hx of SMA syndrome AEB severe and moderate muscle wasting and moderate fat loss.     Recommendations/Plan:  Pt's preferences/likes/dislikes from prior admits remain in place in dietary program [employer]. Multiple small meals per historical pt preferences.   Provide TID Greek yogurt+fruit between meals to bolster kcal/protein intake.  Encourage intake of meals >50-75%.  Document intake of all PO as % taken in ADL's to provide interdisciplinary communication across all shifts.   Monitor weight.    RD following.

## 2024-03-14 NOTE — PROGRESS NOTES
4 Eyes Skin Assessment Completed by LEANNE Chao and LEANNE Nascimento.    Head WDL  Ears WDL  Nose WDL  Mouth WDL  Neck WDL  Breast/Chest WDL  Shoulder Blades WDL  Spine WDL  (R) Arm/Elbow/Hand Redness and Bruising  (L) Arm/Elbow/Hand Redness and Bruising  Abdomen Bruising  Groin WDL  Scrotum/Coccyx/Buttocks Redness, Blanching, and Discoloration  (R) Leg WDL  (L) Leg WDL  (R) Heel/Foot/Toe Redness, Blanching, and Scab  (L) Heel/Foot/Toe Redness, Blanching, and Scab          Devices In Places ECG, Blood Pressure Cuff, Pulse Ox, and Central Line      Interventions In Place Low Air Loss Mattress and Heels Loaded W/Pillows    Possible Skin Injury No    Pictures Uploaded Into Epic N/A  Wound Consult Placed N/A  RN Wound Prevention Protocol Ordered No

## 2024-03-14 NOTE — PROGRESS NOTES
4 Eyes Skin Assessment Completed by LEANNE Nowak and LEANNE Templeton.    Head WDL  Ears WDL  Nose WDL  Mouth WDL  Neck WDL L EJ IV  Breast/Chest WDL  Shoulder Blades Redness and Blanching  Spine Redness and Blanching  (R) Arm/Elbow/Hand WDL, missing all digits except for thumb  (L) Arm/Elbow/Hand WDL  Abdomen WDL  Groin WDL  Scrotum/Coccyx/Buttocks Redness and Blanching  (R) Leg WDL   (L) Leg WDL   (R) Heel/Foot/Toe Redness and Scab great toe  (L) Heel/Foot/Toe Redness and Scab great toe          Devices In Places ECG, Blood Pressure Cuff, and Pulse Ox      Interventions In Place Sacral Mepilex, Pillows, Q2 Turns, and Low Air Loss Mattress    Possible Skin Injury No    Pictures Uploaded Into Epic Yes  Wound Consult Placed N/A  RN Wound Prevention Protocol Ordered No

## 2024-03-15 LAB
ALBUMIN SERPL BCP-MCNC: 2.4 G/DL (ref 3.2–4.9)
ALBUMIN/GLOB SERPL: 0.7 G/DL
ALP SERPL-CCNC: 96 U/L (ref 30–99)
ALT SERPL-CCNC: <5 U/L (ref 2–50)
ANION GAP SERPL CALC-SCNC: 14 MMOL/L (ref 7–16)
AST SERPL-CCNC: 8 U/L (ref 12–45)
BACTERIA UR CULT: NORMAL
BASOPHILS # BLD AUTO: 0.1 % (ref 0–1.8)
BASOPHILS # BLD: 0.01 K/UL (ref 0–0.12)
BILIRUB SERPL-MCNC: <0.2 MG/DL (ref 0.1–1.5)
BUN SERPL-MCNC: 15 MG/DL (ref 8–22)
CALCIUM ALBUM COR SERPL-MCNC: 9.1 MG/DL (ref 8.5–10.5)
CALCIUM SERPL-MCNC: 7.8 MG/DL (ref 8.5–10.5)
CHLORIDE SERPL-SCNC: 115 MMOL/L (ref 96–112)
CO2 SERPL-SCNC: 9 MMOL/L (ref 20–33)
CREAT SERPL-MCNC: 0.77 MG/DL (ref 0.5–1.4)
EOSINOPHIL # BLD AUTO: 0 K/UL (ref 0–0.51)
EOSINOPHIL NFR BLD: 0 % (ref 0–6.9)
ERYTHROCYTE [DISTWIDTH] IN BLOOD BY AUTOMATED COUNT: 61.3 FL (ref 35.9–50)
GFR SERPLBLD CREATININE-BSD FMLA CKD-EPI: 93 ML/MIN/1.73 M 2
GLOBULIN SER CALC-MCNC: 3.3 G/DL (ref 1.9–3.5)
GLUCOSE SERPL-MCNC: 172 MG/DL (ref 65–99)
HCT VFR BLD AUTO: 24.1 % (ref 37–47)
HGB BLD-MCNC: 7.8 G/DL (ref 12–16)
IMM GRANULOCYTES # BLD AUTO: 0.2 K/UL (ref 0–0.11)
IMM GRANULOCYTES NFR BLD AUTO: 1.2 % (ref 0–0.9)
LYMPHOCYTES # BLD AUTO: 0.63 K/UL (ref 1–4.8)
LYMPHOCYTES NFR BLD: 3.8 % (ref 22–41)
MAGNESIUM SERPL-MCNC: 2.2 MG/DL (ref 1.5–2.5)
MCH RBC QN AUTO: 31.7 PG (ref 27–33)
MCHC RBC AUTO-ENTMCNC: 32.4 G/DL (ref 32.2–35.5)
MCV RBC AUTO: 98 FL (ref 81.4–97.8)
MONOCYTES # BLD AUTO: 0.2 K/UL (ref 0–0.85)
MONOCYTES NFR BLD AUTO: 1.2 % (ref 0–13.4)
NEUTROPHILS # BLD AUTO: 15.74 K/UL (ref 1.82–7.42)
NEUTROPHILS NFR BLD: 93.7 % (ref 44–72)
NRBC # BLD AUTO: 0 K/UL
NRBC BLD-RTO: 0 /100 WBC (ref 0–0.2)
PHOSPHATE SERPL-MCNC: 4.3 MG/DL (ref 2.5–4.5)
PLATELET # BLD AUTO: 290 K/UL (ref 164–446)
PMV BLD AUTO: 10.2 FL (ref 9–12.9)
POTASSIUM SERPL-SCNC: 3.5 MMOL/L (ref 3.6–5.5)
PROT SERPL-MCNC: 5.7 G/DL (ref 6–8.2)
RBC # BLD AUTO: 2.46 M/UL (ref 4.2–5.4)
SIGNIFICANT IND 70042: NORMAL
SITE SITE: NORMAL
SODIUM SERPL-SCNC: 138 MMOL/L (ref 135–145)
SOURCE SOURCE: NORMAL
WBC # BLD AUTO: 16.8 K/UL (ref 4.8–10.8)

## 2024-03-15 PROCEDURE — 700102 HCHG RX REV CODE 250 W/ 637 OVERRIDE(OP): Performed by: NURSE PRACTITIONER

## 2024-03-15 PROCEDURE — 83735 ASSAY OF MAGNESIUM: CPT

## 2024-03-15 PROCEDURE — 770020 HCHG ROOM/CARE - TELE (206)

## 2024-03-15 PROCEDURE — 80053 COMPREHEN METABOLIC PANEL: CPT

## 2024-03-15 PROCEDURE — 700111 HCHG RX REV CODE 636 W/ 250 OVERRIDE (IP): Performed by: INTERNAL MEDICINE

## 2024-03-15 PROCEDURE — 700111 HCHG RX REV CODE 636 W/ 250 OVERRIDE (IP): Performed by: HOSPITALIST

## 2024-03-15 PROCEDURE — 700105 HCHG RX REV CODE 258: Performed by: HOSPITALIST

## 2024-03-15 PROCEDURE — A9270 NON-COVERED ITEM OR SERVICE: HCPCS | Performed by: NURSE PRACTITIONER

## 2024-03-15 PROCEDURE — A9270 NON-COVERED ITEM OR SERVICE: HCPCS | Performed by: INTERNAL MEDICINE

## 2024-03-15 PROCEDURE — A9270 NON-COVERED ITEM OR SERVICE: HCPCS

## 2024-03-15 PROCEDURE — 85025 COMPLETE CBC W/AUTO DIFF WBC: CPT

## 2024-03-15 PROCEDURE — 99233 SBSQ HOSP IP/OBS HIGH 50: CPT | Performed by: INTERNAL MEDICINE

## 2024-03-15 PROCEDURE — 700102 HCHG RX REV CODE 250 W/ 637 OVERRIDE(OP): Performed by: INTERNAL MEDICINE

## 2024-03-15 PROCEDURE — 84100 ASSAY OF PHOSPHORUS: CPT

## 2024-03-15 PROCEDURE — 700102 HCHG RX REV CODE 250 W/ 637 OVERRIDE(OP): Performed by: STUDENT IN AN ORGANIZED HEALTH CARE EDUCATION/TRAINING PROGRAM

## 2024-03-15 PROCEDURE — A9270 NON-COVERED ITEM OR SERVICE: HCPCS | Performed by: STUDENT IN AN ORGANIZED HEALTH CARE EDUCATION/TRAINING PROGRAM

## 2024-03-15 PROCEDURE — 700102 HCHG RX REV CODE 250 W/ 637 OVERRIDE(OP)

## 2024-03-15 PROCEDURE — 700111 HCHG RX REV CODE 636 W/ 250 OVERRIDE (IP): Mod: JZ | Performed by: INTERNAL MEDICINE

## 2024-03-15 RX ORDER — OXYCODONE HYDROCHLORIDE 5 MG/1
5 TABLET ORAL EVERY 4 HOURS PRN
Status: DISCONTINUED | OUTPATIENT
Start: 2024-03-15 | End: 2024-03-17 | Stop reason: HOSPADM

## 2024-03-15 RX ORDER — OXYCODONE HYDROCHLORIDE 10 MG/1
10 TABLET ORAL EVERY 4 HOURS PRN
Status: DISCONTINUED | OUTPATIENT
Start: 2024-03-15 | End: 2024-03-17 | Stop reason: HOSPADM

## 2024-03-15 RX ORDER — HYDROMORPHONE HYDROCHLORIDE 1 MG/ML
1 INJECTION, SOLUTION INTRAMUSCULAR; INTRAVENOUS; SUBCUTANEOUS EVERY 4 HOURS PRN
Status: DISCONTINUED | OUTPATIENT
Start: 2024-03-15 | End: 2024-03-17 | Stop reason: HOSPADM

## 2024-03-15 RX ORDER — PREDNISONE 20 MG/1
20 TABLET ORAL DAILY
Status: DISCONTINUED | OUTPATIENT
Start: 2024-04-13 | End: 2024-03-16

## 2024-03-15 RX ORDER — AMOXICILLIN 250 MG
2 CAPSULE ORAL 2 TIMES DAILY
Status: DISCONTINUED | OUTPATIENT
Start: 2024-03-15 | End: 2024-03-17 | Stop reason: HOSPADM

## 2024-03-15 RX ORDER — PREDNISONE 10 MG/1
10 TABLET ORAL DAILY
Status: DISCONTINUED | OUTPATIENT
Start: 2024-04-20 | End: 2024-03-16

## 2024-03-15 RX ORDER — POLYETHYLENE GLYCOL 3350 17 G/17G
1 POWDER, FOR SOLUTION ORAL DAILY
Status: DISCONTINUED | OUTPATIENT
Start: 2024-03-15 | End: 2024-03-17 | Stop reason: HOSPADM

## 2024-03-15 RX ORDER — OXYCODONE HYDROCHLORIDE 5 MG/1
5 TABLET ORAL ONCE
Status: COMPLETED | OUTPATIENT
Start: 2024-03-15 | End: 2024-03-15

## 2024-03-15 RX ORDER — PREDNISONE 50 MG/1
50 TABLET ORAL DAILY
Status: DISCONTINUED | OUTPATIENT
Start: 2024-03-23 | End: 2024-03-16

## 2024-03-15 RX ORDER — METRONIDAZOLE 500 MG/1
500 TABLET ORAL EVERY 12 HOURS
Status: DISCONTINUED | OUTPATIENT
Start: 2024-03-15 | End: 2024-03-17 | Stop reason: HOSPADM

## 2024-03-15 RX ORDER — DOCUSATE SODIUM 100 MG/1
100 CAPSULE, LIQUID FILLED ORAL 2 TIMES DAILY PRN
Status: DISCONTINUED | OUTPATIENT
Start: 2024-03-15 | End: 2024-03-17 | Stop reason: HOSPADM

## 2024-03-15 RX ORDER — ALPRAZOLAM 0.25 MG/1
0.25 TABLET ORAL 3 TIMES DAILY PRN
Status: DISCONTINUED | OUTPATIENT
Start: 2024-03-15 | End: 2024-03-17 | Stop reason: HOSPADM

## 2024-03-15 RX ORDER — CALCIUM CARBONATE 500 MG/1
500 TABLET, CHEWABLE ORAL 3 TIMES DAILY
Status: DISCONTINUED | OUTPATIENT
Start: 2024-03-15 | End: 2024-03-17 | Stop reason: HOSPADM

## 2024-03-15 RX ORDER — PREDNISONE 20 MG/1
40 TABLET ORAL DAILY
Status: DISCONTINUED | OUTPATIENT
Start: 2024-03-30 | End: 2024-03-16

## 2024-03-15 RX ADMIN — HYDROMORPHONE HYDROCHLORIDE 1 MG: 1 INJECTION, SOLUTION INTRAMUSCULAR; INTRAVENOUS; SUBCUTANEOUS at 13:42

## 2024-03-15 RX ADMIN — KETOROLAC TROMETHAMINE 15 MG: 15 INJECTION, SOLUTION INTRAMUSCULAR; INTRAVENOUS at 03:48

## 2024-03-15 RX ADMIN — SODIUM CHLORIDE, POTASSIUM CHLORIDE, SODIUM LACTATE AND CALCIUM CHLORIDE: 600; 310; 30; 20 INJECTION, SOLUTION INTRAVENOUS at 14:08

## 2024-03-15 RX ADMIN — OXYCODONE 5 MG: 5 TABLET ORAL at 00:28

## 2024-03-15 RX ADMIN — SODIUM BICARBONATE 650 MG: 650 TABLET ORAL at 08:14

## 2024-03-15 RX ADMIN — ACETAMINOPHEN 650 MG: 325 TABLET, FILM COATED ORAL at 08:14

## 2024-03-15 RX ADMIN — SODIUM CHLORIDE, POTASSIUM CHLORIDE, SODIUM LACTATE AND CALCIUM CHLORIDE: 600; 310; 30; 20 INJECTION, SOLUTION INTRAVENOUS at 03:52

## 2024-03-15 RX ADMIN — OXYCODONE 5 MG: 5 TABLET ORAL at 10:21

## 2024-03-15 RX ADMIN — OXYCODONE 5 MG: 5 TABLET ORAL at 03:08

## 2024-03-15 RX ADMIN — ANTACID TABLETS 500 MG: 500 TABLET, CHEWABLE ORAL at 13:42

## 2024-03-15 RX ADMIN — SODIUM BICARBONATE 650 MG: 650 TABLET ORAL at 20:41

## 2024-03-15 RX ADMIN — OXYCODONE HYDROCHLORIDE 10 MG: 10 TABLET ORAL at 17:57

## 2024-03-15 RX ADMIN — QUETIAPINE FUMARATE 100 MG: 100 TABLET ORAL at 20:41

## 2024-03-15 RX ADMIN — ACETAMINOPHEN 650 MG: 325 TABLET, FILM COATED ORAL at 03:10

## 2024-03-15 RX ADMIN — ENOXAPARIN SODIUM 30 MG: 100 INJECTION SUBCUTANEOUS at 17:54

## 2024-03-15 RX ADMIN — POLYETHYLENE GLYCOL 3350 1 PACKET: 17 POWDER, FOR SOLUTION ORAL at 10:24

## 2024-03-15 RX ADMIN — ACETAMINOPHEN 650 MG: 325 TABLET, FILM COATED ORAL at 20:41

## 2024-03-15 RX ADMIN — OMEPRAZOLE 40 MG: 20 CAPSULE, DELAYED RELEASE ORAL at 18:06

## 2024-03-15 RX ADMIN — SODIUM BICARBONATE 650 MG: 650 TABLET ORAL at 17:53

## 2024-03-15 RX ADMIN — OXYCODONE 5 MG: 5 TABLET ORAL at 08:14

## 2024-03-15 RX ADMIN — CEFEPIME 2 G: 2 INJECTION, POWDER, FOR SOLUTION INTRAVENOUS at 17:58

## 2024-03-15 RX ADMIN — OXYCODONE HYDROCHLORIDE 10 MG: 10 TABLET ORAL at 23:19

## 2024-03-15 RX ADMIN — METRONIDAZOLE 500 MG: 500 TABLET ORAL at 17:54

## 2024-03-15 RX ADMIN — KETOROLAC TROMETHAMINE 15 MG: 15 INJECTION, SOLUTION INTRAMUSCULAR; INTRAVENOUS at 10:17

## 2024-03-15 RX ADMIN — METHYLPREDNISOLONE SODIUM SUCCINATE 40 MG: 40 INJECTION, POWDER, FOR SOLUTION INTRAMUSCULAR; INTRAVENOUS at 06:27

## 2024-03-15 RX ADMIN — OMEPRAZOLE 40 MG: 20 CAPSULE, DELAYED RELEASE ORAL at 06:24

## 2024-03-15 RX ADMIN — PAROXETINE 60 MG: 30 TABLET, FILM COATED ORAL at 20:41

## 2024-03-15 RX ADMIN — KETOROLAC TROMETHAMINE 15 MG: 15 INJECTION, SOLUTION INTRAMUSCULAR; INTRAVENOUS at 20:41

## 2024-03-15 RX ADMIN — POTASSIUM BICARBONATE 25 MEQ: 978 TABLET, EFFERVESCENT ORAL at 10:24

## 2024-03-15 RX ADMIN — ALPRAZOLAM 0.25 MG: 0.25 TABLET ORAL at 23:19

## 2024-03-15 RX ADMIN — LIDOCAINE HYDROCHLORIDE 30 ML: 20 SOLUTION OROPHARYNGEAL at 12:59

## 2024-03-15 RX ADMIN — SODIUM CHLORIDE, POTASSIUM CHLORIDE, SODIUM LACTATE AND CALCIUM CHLORIDE: 600; 310; 30; 20 INJECTION, SOLUTION INTRAVENOUS at 23:18

## 2024-03-15 RX ADMIN — METRONIDAZOLE 500 MG: 500 INJECTION, SOLUTION INTRAVENOUS at 06:24

## 2024-03-15 RX ADMIN — DOCUSATE SODIUM 50 MG AND SENNOSIDES 8.6 MG 2 TABLET: 8.6; 5 TABLET, FILM COATED ORAL at 17:54

## 2024-03-15 RX ADMIN — OXYCODONE 5 MG: 5 TABLET ORAL at 10:16

## 2024-03-15 ASSESSMENT — ENCOUNTER SYMPTOMS
WEAKNESS: 0
NEUROLOGICAL NEGATIVE: 1
FEVER: 0
RESPIRATORY NEGATIVE: 1
FALLS: 1
CHILLS: 0
BACK PAIN: 1
CARDIOVASCULAR NEGATIVE: 1
VOMITING: 0
FOCAL WEAKNESS: 0
COUGH: 0
DIZZINESS: 0
NECK PAIN: 0
HEARTBURN: 0
PALPITATIONS: 0
NERVOUS/ANXIOUS: 1
NAUSEA: 1
HEADACHES: 0
EYES NEGATIVE: 1
ABDOMINAL PAIN: 1
BRUISES/BLEEDS EASILY: 0
MYALGIAS: 1
DEPRESSION: 0
POLYDIPSIA: 0

## 2024-03-15 ASSESSMENT — FIBROSIS 4 INDEX: FIB4 SCORE: 0.67

## 2024-03-15 ASSESSMENT — PAIN DESCRIPTION - PAIN TYPE: TYPE: ACUTE PAIN

## 2024-03-15 NOTE — PROGRESS NOTES
Bedside report received from off going RN/tech: sita, assumed care of patient.     Fall Risk Score: HIGH RISK  Fall risk interventions in place: Place yellow fall risk ID band on patient, Provide patient/family education based on risk assessment, Educate patient/family to call staff for assistance when getting out of bed, Place fall precaution signage outside patient door, and Place patient in room close to nursing station  Bed type: Regular (Zan Score less than 17 interventions in place)  Patient on cardiac monitor: Yes  IVF/IV medications: Infusion per MAR (List Med(s)) LR at 100  Oxygen: Room Air  Bedside sitter: Not Applicable   Isolation: Not applicable    Pt currently asking for pain medications and feels like pain is unrelieved with current pain regimen.

## 2024-03-15 NOTE — PROGRESS NOTES
Pt c/o of severe abdominal pain, gave 5mg and gave another 5mg immediately to bring the oxycodone  dose to 10mg which she is prescribed. Will reassess the pain levels and prescribe accordingly to the MAR.

## 2024-03-15 NOTE — PROGRESS NOTES
Monitor Summary  Rhythm: Normal Sinus Rhythm /w 1st degree  Rate: 78-98  Ectopy: PACs  .21 / .09 / .38

## 2024-03-15 NOTE — DISCHARGE PLANNING
Case Management Discharge Planning    Admission Date: 3/13/2024  GMLOS: 3.9  ALOS: 2    6-Clicks ADL Score:    6-Clicks Mobility Score:    PT and/or OT Eval ordered: No  Post-acute Referrals Ordered: No  Post-acute Choice Obtained: No  Has referral(s) been sent to post-acute provider:  No      Anticipated Discharge Dispo: Discharge Disposition: Discharged to home/self care (01)    DME Needed: No    Action(s) Taken: DC Assessment Complete (See below)    RN CM  met with pt at bedside to complete assessment and discuss discharge planning.Pt A&Ox4 and able to verify the information on the face sheet.     Pt lives with two adult sons in a single-story house with one step to get inside. Please see face sheet for emergency contacts.     Patient reports, prior to admission she used a cane, walker and wheel chair at baseline. Her adult sons assist with some ADLs but she otherwise is pretty independent.     Pt reported that her adult sons are good support for her. Pt receives monthly disability deposits.      The patient's PCP is Dr. Rashmi Gotti.  Patient's preferred pharmacy is [facility] located on [facility].      Patient denies a history of SNF/IPR use but stated she had [facility] in the past.   Patient denies tobacco, alcohol and recreational drug use. Pt stated she quit smoking in December.     Patient admits to getting treatment for Bipolar and manic depressive disorder, she reports she sees a counselor and is complaint with her medications.     Patients confirmed medical coverage via Medicaid FFS.  Patient has means to transportation and her sons will provide transport once medically stable for discharge.      RN discussed discharge planning.  Patient reported pt's goal is to return home once medically stable. Anticipate that pt will discharge home with no CM needs.     Escalations Completed: None    Medically Clear: No    Next Steps: F/U with medical team regarding D/C needs/ barriers.     Barriers to  Discharge: Medical clearance    Is the patient up for discharge tomorrow: No         Care Transition Team Assessment    Information Source  Orientation Level: Oriented X4  Information Given By: Patient  Informant's Name: Mary  Who is responsible for making decisions for patient? : Patient    Readmission Evaluation  Is this a readmission?: Yes - unplanned readmission  Was an appointment arranged for you prior to discharge?: No  Did you have enough support after your last discharge?: Yes    Elopement Risk  Legal Hold: No  Ambulatory or Self Mobile in Wheelchair: No-Not an Elopement Risk  Elopement Risk: Not at Risk for Elopement    Discharge Preparedness  What is your plan after discharge?: Home with help  What are your discharge supports?: Child (Adult children)  Prior Functional Level: Uses Cane, Uses Walker, Uses Wheelchair, Needs Assist with Activities of Daily Living, Independent with Medication Management    Functional Assesment  Prior Functional Level: Uses Cane, Uses Walker, Uses Wheelchair, Needs Assist with Activities of Daily Living, Independent with Medication Management    Finances  Financial Barriers to Discharge: No  Prescription Coverage: Yes    Advance Directive  Advance Directive?: None    Domestic Abuse  Have you ever been the victim of abuse or violence?: No    Psychological Assessment  History of Substance Abuse: None  History of Psychiatric Problems: Yes  Non-compliant with Treatment: No    Discharge Risks or Barriers  Discharge risks or barriers?: Complex medical needs  Patient risk factors: Complex medical needs    Anticipated Discharge Information  Discharge Disposition: Discharged to home/self care (01)

## 2024-03-15 NOTE — CARE PLAN
The patient is Stable - Low risk of patient condition declining or worsening    Shift Goals  Clinical Goals: Pain control  Patient Goals: Pain control  Family Goals: NAIN    Progress made toward(s) clinical / shift goals:        Problem: Pain - Standard  Goal: Alleviation of pain or a reduction in pain to the patient’s comfort goal  Outcome: Progressing  Flowsheets (Taken 3/15/2024 1615)  Pain Rating Scale (NPRS): 5  Note: Pt assessed for pain Q4h and medicated PRN. Pt instructed to notify RN of any new or increasing pain to prevent it from becoming intolerable. Verbalized understanding.       Problem: Knowledge Deficit - Standard  Goal: Patient and family/care givers will demonstrate understanding of plan of care, disease process/condition, diagnostic tests and medications  Outcome: Progressing     Problem: Skin Integrity  Goal: Skin integrity is maintained or improved  Outcome: Progressing

## 2024-03-15 NOTE — PROGRESS NOTES
Bedside report received from off going RN: Ciro, assumed care of patient.     Fall Risk Score: HIGH RISK  Fall risk interventions in place: Place yellow fall risk ID band on patient, Provide patient/family education based on risk assessment, Educate patient/family to call staff for assistance when getting out of bed, Place fall precaution signage outside patient door, Place patient in room close to nursing station, Utilize bed/chair fall alarm, Notify charge of high risk for huddle, and Bed alarm connected correctly  Bed type: Regular (Zan Score less than 17 interventions in place)  Patient on cardiac monitor: Yes  IVF/IV medications: Infusion per MAR (List Med(s)) LR @ 100ml/h  Oxygen: Room Air  Bedside sitter: Not Applicable   Isolation: Not applicable

## 2024-03-15 NOTE — PROGRESS NOTES
[facility] Medicine Daily Progress Note    Date of Service  3/15/2024    Chief Complaint  Mary Martinez is a 53 y.o. female admitted 3/13/2024 with abdominal pain, nausea, fall, brought in by ambulance    Hospital Course  Mary Martinez is a 54-year-old female with a complicated medical history including rheumatoid arthritis on Enbrel, chronic pain syndrome, history of depression, history of extensive GI pathology and history, presenting on 3/13 with abdominal pain, onset was fairly sudden after eating lunch, nausea, abdominal pain persisted.  The patient denies fevers or chills or any additional pain.  She states she has daily bowel movements, did not feel constipated.  Patient was recently admitted for similar presentation where the patient was diagnosed with opioid-induced constipation.  The patient reports taking stool softeners daily.  The patient has a history of alcoholism, and tobacco abuse.  She quit several months ago.  On admission the patient was felt to be dehydrated, had a low blood pressure with a blood pressure of 77/51.  Identified to have leukocytosis, a potassium of 2.4, bicarb of 7 and a creatinine of 0.9, indicating metabolic acidosis.  The patient was undergoing CT scanning of her abdomen showing moderate colonic stool, no other major significant findings were seen.  The patient was fluid resuscitated and started on empiric antibiotics given her presentation.  The patient had unremarkable urinalysis, significant elevated procalcitonin, cultures were taken and so far negative including blood culture, urine culture,  Chest x-ray showed no acute pathology,  CT abdomen showed nonobstructing left renal stones, mild bilateral hydronephrosis, new borderline enlarged pelvic lymph nodes are nonspecific, moderate colonic stool, biliary dilatation again noted in this postcholecystectomy patient.    Interval Problem Update  Patient was seen and examined at bedside.  I have personally reviewed and  Initial Assessment: VSS. Communicates needs without difficulty. Pleasant and co-op. Denies SOB. Denies chest/shoulder/arm pain or discomfort. No acute distress noted.    interpreted vitals, labs, and imaging.    3/15.  Afebrile.  Tachycardia is improved.  Hypotension is improved.  On room air.  Replete with potassium bicarb.  Patient has non-anion gap acidosis.  Denies fevers, chills, chest pains.  Patient reports severe abdominal pain which she states is worse than her chronic pain.  She states she is short of breath because of the pain.  Adjusted pain regimen.  Aggressive bowel regimen.  Wbc 18.2 > 16.8  Procal 17 > 16.3  Hgb 9.2 > 8.7 > 8.4 > 7.8      I have discussed this patient's plan of care and discharge plan at IDT rounds today with Case Management, Nursing, Nursing leadership, and other members of the IDT team.    Consultants/Specialty  critical care    Code Status  DNAR/DNI    Disposition  The patient is not medically cleared for discharge to home or a post-acute facility.      I have placed the appropriate orders for post-discharge needs.    Review of Systems  Review of Systems   Constitutional:  Positive for malaise/fatigue. Negative for chills and fever.   HENT: Negative.     Eyes: Negative.    Respiratory: Negative.  Negative for cough.    Cardiovascular: Negative.  Negative for chest pain, palpitations and leg swelling.   Gastrointestinal:  Positive for abdominal pain and nausea. Negative for heartburn and vomiting.   Genitourinary: Negative.  Negative for dysuria and frequency.   Musculoskeletal:  Positive for back pain, falls, joint pain and myalgias. Negative for neck pain.   Skin: Negative.  Negative for itching and rash.   Neurological: Negative.  Negative for dizziness, focal weakness, weakness and headaches.   Endo/Heme/Allergies: Negative.  Negative for polydipsia. Does not bruise/bleed easily.   Psychiatric/Behavioral:  Negative for depression. The patient is nervous/anxious.         Physical Exam  Temp:  [36.6 °C (97.9 °F)-37 °C (98.6 °F)] 37 °C (98.6 °F)  Pulse:  [] 92  Resp:  [11-20] 15  BP: ()/(51-71) 90/59  SpO2:  [92 %-96 %] 96  %    Physical Exam  Vitals and nursing note reviewed.   Constitutional:       Appearance: She is well-developed. She is not diaphoretic.      Comments: Chronically ill-appearing, frail, 53-year-old   HENT:      Head: Normocephalic and atraumatic.      Nose: Nose normal.      Mouth/Throat:      Mouth: Mucous membranes are dry.   Eyes:      Conjunctiva/sclera: Conjunctivae normal.      Pupils: Pupils are equal, round, and reactive to light.   Neck:      Thyroid: No thyromegaly.      Vascular: No JVD.   Cardiovascular:      Rate and Rhythm: Regular rhythm. Tachycardia present.      Heart sounds: Normal heart sounds.      No friction rub. No gallop.   Pulmonary:      Effort: Pulmonary effort is normal.      Breath sounds: Normal breath sounds. No wheezing or rales.   Abdominal:      General: Bowel sounds are normal. There is no distension.      Palpations: Abdomen is soft. There is no mass.      Tenderness: There is abdominal tenderness. There is no guarding or rebound.   Musculoskeletal:         General: Swelling, tenderness and deformity present. Normal range of motion.      Cervical back: Normal range of motion and neck supple.   Lymphadenopathy:      Cervical: No cervical adenopathy.   Skin:     General: Skin is warm and dry.      Coloration: Skin is pale.   Neurological:      Mental Status: She is alert and oriented to person, place, and time.      Cranial Nerves: No cranial nerve deficit.   Psychiatric:         Behavior: Behavior normal.         Fluids    Intake/Output Summary (Last 24 hours) at 3/15/2024 0814  Last data filed at 3/14/2024 2000  Gross per 24 hour   Intake 2020.4 ml   Output 1300 ml   Net 720.4 ml       Laboratory  Recent Labs     03/13/24  1800 03/14/24  0342 03/15/24  0330   WBC 19.5* 18.2* 16.8*   RBC 2.74* 2.67* 2.46*   HEMOGLOBIN 8.7* 8.4* 7.8*   HEMATOCRIT 26.8* 26.0* 24.1*   MCV 97.8 97.4 98.0*   MCH 31.8 31.5 31.7   MCHC 32.5 32.3 32.4   RDW 58.9* 60.3* 61.3*   PLATELETCT 322 288 290   MPV  10.3 10.1 10.2     Recent Labs     03/13/24  1800 03/14/24  0342 03/15/24  0330   SODIUM 139 137 138   POTASSIUM 3.0* 3.5* 3.5*   CHLORIDE 116* 114* 115*   CO2 9* 9* 9*   GLUCOSE 97 97 172*   BUN 17 18 15   CREATININE 0.78 0.78 0.77   CALCIUM 7.7* 7.4* 7.8*     Recent Labs     03/13/24  1238   APTT 26.9   INR 1.19*               Imaging  DX-CHEST-FOR LINE PLACEMENT Perform procedure in: Patient's Room   Final Result         1.  No acute cardiopulmonary disease.      DX-CHEST-PORTABLE (1 VIEW)   Final Result         1. No acute cardiopulmonary abnormalities are identified.      CT-ABDOMEN-PELVIS WITH   Final Result      1.  Nonobstructing left renal stones.   2.  Mild bilateral hydronephrosis.   3.  New borderline enlarged pelvic lymph nodes are nonspecific.   4.  Moderate colonic stool may represent constipation.   5.  Biliary dilatation again noted in this postcholecystectomy patient.           Assessment/Plan  * SIRS (systemic inflammatory response syndrome) (HCC)- (present on admission)  Assessment & Plan  3/15/2024  SIRS criteria identified on my evaluation include:  Tachycardia, with heart rate greater than 90 BPM and Leukocytosis, with WBC greater than 12,000  SIRS is non-infectious, the patient does not have sepsis  Chest x-ray did not show any signs of infiltration, denies any urinary symptoms  Blood cultures pending  Will start with cefepime and Flagyl, patient is allergic to penicillin  Possible intra-abdominal pathology    Constipation- (present on admission)  Assessment & Plan  3/15/2024  Came with abdominal pain and CT scan showed likely constipation  Patient was treated for constipation last month  Patient is on chronic opioid  Patient states she is taking stool softener at home   Scheduled MiraLAX  Follow-up with x-ray    Normal anion gap metabolic acidosis- (present on admission)  Assessment & Plan  3/15/2024  Likely related to RTA  IV fluid and monitor with labs      Bipolar 1 disorder (HCC)-  (present on admission)  Assessment & Plan  3/15/2024  Continue home medication Seroquel    Hypotension- (present on admission)  Assessment & Plan  3/15/2024  Patient has a chronic hypotension  However got worse likely related to vomiting  IV fluid  Close monitoring and start with pressors if needed   Start with antibiotics for possible sepsis and de-escalate antibiotic later    Severe protein-calorie malnutrition (HCC)- (present on admission)  Assessment & Plan  3/15/2024  Cachectic appearance and muscles loss  Nutrition consult    DNR (do not resuscitate)- (present on admission)  Assessment & Plan  The goal of care was discussed in detail with the patient, discussed the CODE STATUS, patient does not want to be intubated, patient wants to be DNR/DNI, time 20 minutes.    History of rheumatoid arthritis- (present on admission)  Assessment & Plan  3/15/2024  On chronic pain medication  Follow-up with rheumatologist  Usually is maintained with Enbrel  Start steroids for acute pain syndrome    Hypokalemia- (present on admission)  Assessment & Plan  3/15/2024  Likely related to vomiting  Replace IV, monitor phosphorus and magnesium    Leukocytosis- (present on admission)  Assessment & Plan  3/15/2024  Came with leukocytosis 16  Procalcitonin is elevated  No fever or chills  No source of infection however we will start with cefepime and Flagyl and de-escalate antibiotics as appropriate,  Cultures pending  IV fluids        VTE prophylaxis: Lovenox    I have performed a physical exam and reviewed and updated ROS and Plan today (3/15/2024). In review of yesterday's note (3/14/2024), there are no changes except as documented above.      Greater than 51 minutes spent prepping to see patient (e.g. review of tests) obtaining and/or reviewing separately obtained history. Performing a medically appropriate examination and/ evaluation.  Counseling and educating the patient/family/caregiver.  Ordering medications, tests, or  procedures.  Referring and communicating with other health care professionals.  Documenting clinical information in EPIC.  Independently interpreting results and communicating results to patient/family/caregiver.  Care coordination.

## 2024-03-15 NOTE — DISCHARGE PLANNING
Community Health Worker Intake    Identified no barriers.  Contact information provided to Mary Martinez   Has PCP appointment scheduled for 3/21/2024  Inpatient assessment completed.    CHW re-introduced community care management to the patient.     Pt has been scheduled a hospital follow up on 3/21/2024 at 1pm with Stormy Gotti M.D.     Pt declines having any barriers to housing, transportation, or food.     Pt's only concern is that she is in pain and is on a lower dose of pain medication than she normally would on her home regimen.   CHW will mention this to the patient's care team today at rounds.     Plan: CHW will follow up post hospital DC unless the pt is discharged with post acute services.

## 2024-03-16 LAB
ANION GAP SERPL CALC-SCNC: 12 MMOL/L (ref 7–16)
BASE EXCESS BLDV CALC-SCNC: -13 MMOL/L
BASOPHILS # BLD AUTO: 0.1 % (ref 0–1.8)
BASOPHILS # BLD: 0.01 K/UL (ref 0–0.12)
BODY TEMPERATURE: 37 CENTIGRADE
BUN SERPL-MCNC: 17 MG/DL (ref 8–22)
CALCIUM SERPL-MCNC: 7.8 MG/DL (ref 8.5–10.5)
CHLORIDE SERPL-SCNC: 114 MMOL/L (ref 96–112)
CO2 SERPL-SCNC: 13 MMOL/L (ref 20–33)
CORTIS SERPL-MCNC: 2.3 UG/DL (ref 0–23)
CREAT SERPL-MCNC: 0.97 MG/DL (ref 0.5–1.4)
EOSINOPHIL # BLD AUTO: 0 K/UL (ref 0–0.51)
EOSINOPHIL NFR BLD: 0 % (ref 0–6.9)
ERYTHROCYTE [DISTWIDTH] IN BLOOD BY AUTOMATED COUNT: 61 FL (ref 35.9–50)
GFR SERPLBLD CREATININE-BSD FMLA CKD-EPI: 70 ML/MIN/1.73 M 2
GLUCOSE SERPL-MCNC: 119 MG/DL (ref 65–99)
HCO3 BLDV-SCNC: 13 MMOL/L (ref 24–28)
HCT VFR BLD AUTO: 23.4 % (ref 37–47)
HGB BLD-MCNC: 7.5 G/DL (ref 12–16)
IMM GRANULOCYTES # BLD AUTO: 0.17 K/UL (ref 0–0.11)
IMM GRANULOCYTES NFR BLD AUTO: 1 % (ref 0–0.9)
INHALED O2 FLOW RATE: ABNORMAL L/MIN
LYMPHOCYTES # BLD AUTO: 1.72 K/UL (ref 1–4.8)
LYMPHOCYTES NFR BLD: 10.4 % (ref 22–41)
MAGNESIUM SERPL-MCNC: 2.1 MG/DL (ref 1.5–2.5)
MCH RBC QN AUTO: 31.8 PG (ref 27–33)
MCHC RBC AUTO-ENTMCNC: 32.1 G/DL (ref 32.2–35.5)
MCV RBC AUTO: 99.2 FL (ref 81.4–97.8)
MONOCYTES # BLD AUTO: 0.56 K/UL (ref 0–0.85)
MONOCYTES NFR BLD AUTO: 3.4 % (ref 0–13.4)
NEUTROPHILS # BLD AUTO: 14.01 K/UL (ref 1.82–7.42)
NEUTROPHILS NFR BLD: 85.1 % (ref 44–72)
NRBC # BLD AUTO: 0 K/UL
NRBC BLD-RTO: 0 /100 WBC (ref 0–0.2)
PCO2 BLDV: 30.3 MMHG (ref 41–51)
PCO2 TEMP ADJ BLDV: 30.3 MMHG (ref 41–51)
PH BLDV: 7.26 [PH] (ref 7.31–7.45)
PH TEMP ADJ BLDV: 7.26 [PH] (ref 7.31–7.45)
PHOSPHATE SERPL-MCNC: 2.9 MG/DL (ref 2.5–4.5)
PLATELET # BLD AUTO: 297 K/UL (ref 164–446)
PMV BLD AUTO: 10.5 FL (ref 9–12.9)
PO2 BLDV: 42.5 MMHG (ref 25–40)
PO2 TEMP ADJ BLDV: 42.5 MMHG (ref 25–40)
POTASSIUM SERPL-SCNC: 3.7 MMOL/L (ref 3.6–5.5)
PROCALCITONIN SERPL-MCNC: 5.5 NG/ML
RBC # BLD AUTO: 2.36 M/UL (ref 4.2–5.4)
SAO2 % BLDV: 71.4 %
SODIUM SERPL-SCNC: 139 MMOL/L (ref 135–145)
T4 FREE SERPL-MCNC: 0.57 NG/DL (ref 0.93–1.7)
TSH SERPL DL<=0.005 MIU/L-ACNC: 0.23 UIU/ML (ref 0.38–5.33)
WBC # BLD AUTO: 16.5 K/UL (ref 4.8–10.8)

## 2024-03-16 PROCEDURE — 700111 HCHG RX REV CODE 636 W/ 250 OVERRIDE (IP): Mod: JZ | Performed by: INTERNAL MEDICINE

## 2024-03-16 PROCEDURE — 84145 PROCALCITONIN (PCT): CPT

## 2024-03-16 PROCEDURE — 700105 HCHG RX REV CODE 258: Performed by: HOSPITALIST

## 2024-03-16 PROCEDURE — 85025 COMPLETE CBC W/AUTO DIFF WBC: CPT

## 2024-03-16 PROCEDURE — A9270 NON-COVERED ITEM OR SERVICE: HCPCS | Performed by: INTERNAL MEDICINE

## 2024-03-16 PROCEDURE — 700111 HCHG RX REV CODE 636 W/ 250 OVERRIDE (IP): Performed by: HOSPITALIST

## 2024-03-16 PROCEDURE — 99233 SBSQ HOSP IP/OBS HIGH 50: CPT | Performed by: INTERNAL MEDICINE

## 2024-03-16 PROCEDURE — 700102 HCHG RX REV CODE 250 W/ 637 OVERRIDE(OP): Performed by: INTERNAL MEDICINE

## 2024-03-16 PROCEDURE — 82803 BLOOD GASES ANY COMBINATION: CPT

## 2024-03-16 PROCEDURE — 770020 HCHG ROOM/CARE - TELE (206)

## 2024-03-16 PROCEDURE — 82533 TOTAL CORTISOL: CPT

## 2024-03-16 PROCEDURE — 36415 COLL VENOUS BLD VENIPUNCTURE: CPT

## 2024-03-16 PROCEDURE — 700105 HCHG RX REV CODE 258: Performed by: INTERNAL MEDICINE

## 2024-03-16 PROCEDURE — 84439 ASSAY OF FREE THYROXINE: CPT

## 2024-03-16 PROCEDURE — 87040 BLOOD CULTURE FOR BACTERIA: CPT

## 2024-03-16 PROCEDURE — 84100 ASSAY OF PHOSPHORUS: CPT

## 2024-03-16 PROCEDURE — 83735 ASSAY OF MAGNESIUM: CPT

## 2024-03-16 PROCEDURE — 84443 ASSAY THYROID STIM HORMONE: CPT

## 2024-03-16 PROCEDURE — 80048 BASIC METABOLIC PNL TOTAL CA: CPT

## 2024-03-16 RX ORDER — LEVOTHYROXINE SODIUM 0.07 MG/1
75 TABLET ORAL
Status: DISCONTINUED | OUTPATIENT
Start: 2024-03-16 | End: 2024-03-17 | Stop reason: HOSPADM

## 2024-03-16 RX ORDER — NICOTINE 21 MG/24HR
14 PATCH, TRANSDERMAL 24 HOURS TRANSDERMAL
Status: DISCONTINUED | OUTPATIENT
Start: 2024-03-17 | End: 2024-03-17 | Stop reason: HOSPADM

## 2024-03-16 RX ADMIN — OXYCODONE HYDROCHLORIDE 10 MG: 10 TABLET ORAL at 22:34

## 2024-03-16 RX ADMIN — SODIUM BICARBONATE 650 MG: 650 TABLET ORAL at 09:41

## 2024-03-16 RX ADMIN — HYDROMORPHONE HYDROCHLORIDE 1 MG: 1 INJECTION, SOLUTION INTRAMUSCULAR; INTRAVENOUS; SUBCUTANEOUS at 10:18

## 2024-03-16 RX ADMIN — OXYCODONE HYDROCHLORIDE 10 MG: 10 TABLET ORAL at 12:47

## 2024-03-16 RX ADMIN — OXYCODONE HYDROCHLORIDE 10 MG: 10 TABLET ORAL at 04:07

## 2024-03-16 RX ADMIN — LIDOCAINE HYDROCHLORIDE 30 ML: 20 SOLUTION OROPHARYNGEAL at 23:47

## 2024-03-16 RX ADMIN — OMEPRAZOLE 40 MG: 20 CAPSULE, DELAYED RELEASE ORAL at 17:46

## 2024-03-16 RX ADMIN — CEFEPIME 2 G: 2 INJECTION, POWDER, FOR SOLUTION INTRAVENOUS at 17:46

## 2024-03-16 RX ADMIN — SODIUM BICARBONATE 650 MG: 650 TABLET ORAL at 14:33

## 2024-03-16 RX ADMIN — HYDROMORPHONE HYDROCHLORIDE 1 MG: 1 INJECTION, SOLUTION INTRAMUSCULAR; INTRAVENOUS; SUBCUTANEOUS at 23:46

## 2024-03-16 RX ADMIN — POLYETHYLENE GLYCOL 3350 1 PACKET: 17 POWDER, FOR SOLUTION ORAL at 04:09

## 2024-03-16 RX ADMIN — DOCUSATE SODIUM 50 MG AND SENNOSIDES 8.6 MG 2 TABLET: 8.6; 5 TABLET, FILM COATED ORAL at 04:08

## 2024-03-16 RX ADMIN — DOCUSATE SODIUM 50 MG AND SENNOSIDES 8.6 MG 2 TABLET: 8.6; 5 TABLET, FILM COATED ORAL at 17:46

## 2024-03-16 RX ADMIN — ALPRAZOLAM 0.25 MG: 0.25 TABLET ORAL at 21:11

## 2024-03-16 RX ADMIN — QUETIAPINE FUMARATE 100 MG: 100 TABLET ORAL at 21:11

## 2024-03-16 RX ADMIN — PREDNISONE 60 MG: 50 TABLET ORAL at 06:15

## 2024-03-16 RX ADMIN — OMEPRAZOLE 40 MG: 20 CAPSULE, DELAYED RELEASE ORAL at 04:07

## 2024-03-16 RX ADMIN — DIBASIC SODIUM PHOSPHATE, MONOBASIC POTASSIUM PHOSPHATE AND MONOBASIC SODIUM PHOSPHATE 250 MG: 852; 155; 130 TABLET ORAL at 09:41

## 2024-03-16 RX ADMIN — OXYCODONE HYDROCHLORIDE 10 MG: 10 TABLET ORAL at 17:46

## 2024-03-16 RX ADMIN — SODIUM CHLORIDE, POTASSIUM CHLORIDE, SODIUM LACTATE AND CALCIUM CHLORIDE 1000 ML: 600; 310; 30; 20 INJECTION, SOLUTION INTRAVENOUS at 21:12

## 2024-03-16 RX ADMIN — HYDROMORPHONE HYDROCHLORIDE 1 MG: 1 INJECTION, SOLUTION INTRAMUSCULAR; INTRAVENOUS; SUBCUTANEOUS at 00:05

## 2024-03-16 RX ADMIN — METRONIDAZOLE 500 MG: 500 TABLET ORAL at 04:10

## 2024-03-16 RX ADMIN — HYDROMORPHONE HYDROCHLORIDE 1 MG: 1 INJECTION, SOLUTION INTRAMUSCULAR; INTRAVENOUS; SUBCUTANEOUS at 18:59

## 2024-03-16 RX ADMIN — SODIUM BICARBONATE 650 MG: 650 TABLET ORAL at 21:11

## 2024-03-16 RX ADMIN — HYDROMORPHONE HYDROCHLORIDE 1 MG: 1 INJECTION, SOLUTION INTRAMUSCULAR; INTRAVENOUS; SUBCUTANEOUS at 14:25

## 2024-03-16 RX ADMIN — PAROXETINE 60 MG: 30 TABLET, FILM COATED ORAL at 21:11

## 2024-03-16 RX ADMIN — ENOXAPARIN SODIUM 30 MG: 100 INJECTION SUBCUTANEOUS at 17:46

## 2024-03-16 RX ADMIN — METRONIDAZOLE 500 MG: 500 TABLET ORAL at 17:46

## 2024-03-16 RX ADMIN — CEFEPIME 2 G: 2 INJECTION, POWDER, FOR SOLUTION INTRAVENOUS at 04:10

## 2024-03-16 RX ADMIN — LEVOTHYROXINE SODIUM 75 MCG: 0.07 TABLET ORAL at 09:41

## 2024-03-16 RX ADMIN — HYDROMORPHONE HYDROCHLORIDE 1 MG: 1 INJECTION, SOLUTION INTRAMUSCULAR; INTRAVENOUS; SUBCUTANEOUS at 06:15

## 2024-03-16 RX ADMIN — METHYLNALTREXONE BROMIDE 4 MG: 12 INJECTION, SOLUTION SUBCUTANEOUS at 13:06

## 2024-03-16 RX ADMIN — DOCUSATE SODIUM 100 MG: 100 CAPSULE, LIQUID FILLED ORAL at 09:46

## 2024-03-16 ASSESSMENT — ENCOUNTER SYMPTOMS
CARDIOVASCULAR NEGATIVE: 1
FEVER: 0
NEUROLOGICAL NEGATIVE: 1
BACK PAIN: 1
RESPIRATORY NEGATIVE: 1
DEPRESSION: 0
COUGH: 0
FALLS: 1
NERVOUS/ANXIOUS: 1
CHILLS: 0
NECK PAIN: 0
WEAKNESS: 0
FOCAL WEAKNESS: 0
NAUSEA: 1
VOMITING: 0
ABDOMINAL PAIN: 1
HEADACHES: 0
BRUISES/BLEEDS EASILY: 0
POLYDIPSIA: 0
EYES NEGATIVE: 1
MYALGIAS: 1
HEARTBURN: 0
DIZZINESS: 0
PALPITATIONS: 0

## 2024-03-16 ASSESSMENT — PAIN DESCRIPTION - PAIN TYPE
TYPE: ACUTE PAIN

## 2024-03-16 ASSESSMENT — FIBROSIS 4 INDEX: FIB4 SCORE: 0.65

## 2024-03-16 NOTE — CARE PLAN
The patient is Watcher - Medium risk of patient condition declining or worsening    Shift Goals  Clinical Goals: monitor BP  Patient Goals: pain control  Family Goals: NAIN    Progress made toward(s) clinical / shift goals:        Problem: Knowledge Deficit - Standard  Goal: Patient and family/care givers will demonstrate understanding of plan of care, disease process/condition, diagnostic tests and medications  Outcome: Progressing  Note: Discussed poc answered all current questions.      Problem: Skin Integrity  Goal: Skin integrity is maintained or improved  Outcome: Progressing  Note: Reminding pt to shift and turn in bed through out shift. Changed sheets twice during shift when purwik would leak. Patient educated to call RN when needing to void for assistance.

## 2024-03-16 NOTE — PROGRESS NOTES
Discussed VBG results with on call PA. Patient resting, mentating well, and remains on room air. No new orders ar this time.

## 2024-03-16 NOTE — PROGRESS NOTES
[facility] Medicine Daily Progress Note    Date of Service  3/16/2024    Chief Complaint  Mary Martinez is a 53 y.o. female admitted 3/13/2024 with abdominal pain, nausea, fall, brought in by ambulance    Hospital Course  Mary Martinez is a 54-year-old female with a complicated medical history including rheumatoid arthritis on Enbrel, chronic pain syndrome, history of depression, history of extensive GI pathology and history, presenting on 3/13 with abdominal pain, onset was fairly sudden after eating lunch, nausea, abdominal pain persisted.  The patient denies fevers or chills or any additional pain.  She states she has daily bowel movements, did not feel constipated.  Patient was recently admitted for similar presentation where the patient was diagnosed with opioid-induced constipation.  The patient reports taking stool softeners daily.  The patient has a history of alcoholism, and tobacco abuse.  She quit several months ago.  On admission the patient was felt to be dehydrated, had a low blood pressure with a blood pressure of 77/51.  Identified to have leukocytosis, a potassium of 2.4, bicarb of 7 and a creatinine of 0.9, indicating metabolic acidosis.  The patient was undergoing CT scanning of her abdomen showing moderate colonic stool, no other major significant findings were seen.  The patient was fluid resuscitated and started on empiric antibiotics given her presentation.  The patient had unremarkable urinalysis, significant elevated procalcitonin, cultures were taken and so far negative including blood culture, urine culture,  Chest x-ray showed no acute pathology,  CT abdomen showed nonobstructing left renal stones, mild bilateral hydronephrosis, new borderline enlarged pelvic lymph nodes are nonspecific, moderate colonic stool, biliary dilatation again noted in this postcholecystectomy patient.    Interval Problem Update  Patient was seen and examined at bedside.  I have personally reviewed and  interpreted vitals, labs, and imaging.    3/15.  Afebrile.  Tachycardia is improved.  Hypotension is improved.  On room air.  Replete with potassium bicarb.  Patient has non-anion gap acidosis.  Denies fevers, chills, chest pains.  Patient reports severe abdominal pain which she states is worse than her chronic pain.  She states she is short of breath because of the pain.  Adjusted pain regimen.  Aggressive bowel regimen.  3/16.  Patient had another fever last night.  Hypotension is improved.  On room air.  Replete Phos, K.  Denies fevers, chills.  Reports persistent upper abdominal pain.  Patient states abdominal pain is worsening and her home regimen was no longer working.  GI cocktail helped for 10 minutes.  Reports Tums made her sick.  Has not yet had bowel movement.  Will give a dose of Relistor.  Requests nicotine patch for cravings.  Wbc 18.2 > 16.8 > 16.5  Procal 17 > 16.3 > 5.5  Hgb 9.2 > 8.7 > 8.4 > 7.8 > 7.5    I have discussed this patient's plan of care and discharge plan at IDT rounds today with Case Management, Nursing, Nursing leadership, and other members of the IDT team.    Consultants/Specialty  critical care    Code Status  DNAR/DNI    Disposition  The patient is not medically cleared for discharge to home or a post-acute facility.  Anticipate discharge to: home with organized home healthcare and close outpatient follow-up    I have placed the appropriate orders for post-discharge needs.    Review of Systems  Review of Systems   Constitutional:  Positive for malaise/fatigue. Negative for chills and fever.   HENT: Negative.     Eyes: Negative.    Respiratory: Negative.  Negative for cough.    Cardiovascular: Negative.  Negative for chest pain, palpitations and leg swelling.   Gastrointestinal:  Positive for abdominal pain and nausea. Negative for heartburn and vomiting.   Genitourinary: Negative.  Negative for dysuria and frequency.   Musculoskeletal:  Positive for back pain, falls, joint pain and  myalgias. Negative for neck pain.   Skin: Negative.  Negative for itching and rash.   Neurological: Negative.  Negative for dizziness, focal weakness, weakness and headaches.   Endo/Heme/Allergies: Negative.  Negative for polydipsia. Does not bruise/bleed easily.   Psychiatric/Behavioral:  Negative for depression. The patient is nervous/anxious.         Physical Exam  Temp:  [37 °C (98.6 °F)-38.2 °C (100.8 °F)] 37.1 °C (98.8 °F)  Pulse:  [] 75  Resp:  [15-16] 16  BP: ()/(59-75) 115/75  SpO2:  [92 %-96 %] 92 %    Physical Exam  Vitals and nursing note reviewed.   Constitutional:       Appearance: She is well-developed. She is not diaphoretic.      Comments: Chronically ill-appearing, frail, 53-year-old   HENT:      Head: Normocephalic and atraumatic.      Nose: Nose normal.      Mouth/Throat:      Mouth: Mucous membranes are dry.   Eyes:      Conjunctiva/sclera: Conjunctivae normal.      Pupils: Pupils are equal, round, and reactive to light.   Neck:      Thyroid: No thyromegaly.      Vascular: No JVD.   Cardiovascular:      Rate and Rhythm: Regular rhythm. Tachycardia present.      Heart sounds: Normal heart sounds.      No friction rub. No gallop.   Pulmonary:      Effort: Pulmonary effort is normal.      Breath sounds: Normal breath sounds. No wheezing or rales.   Abdominal:      General: Bowel sounds are normal. There is no distension.      Palpations: Abdomen is soft. There is no mass.      Tenderness: There is abdominal tenderness. There is no guarding or rebound.   Musculoskeletal:         General: Swelling, tenderness and deformity present. Normal range of motion.      Cervical back: Normal range of motion and neck supple.   Lymphadenopathy:      Cervical: No cervical adenopathy.   Skin:     General: Skin is warm and dry.      Coloration: Skin is pale.   Neurological:      Mental Status: She is alert and oriented to person, place, and time.      Cranial Nerves: No cranial nerve deficit.    Psychiatric:         Behavior: Behavior normal.         Fluids    Intake/Output Summary (Last 24 hours) at 3/16/2024 0606  Last data filed at 3/16/2024 0410  Gross per 24 hour   Intake 720 ml   Output 1725 ml   Net -1005 ml       Laboratory  Recent Labs     03/14/24  0342 03/15/24  0330 03/16/24  0020   WBC 18.2* 16.8* 16.5*   RBC 2.67* 2.46* 2.36*   HEMOGLOBIN 8.4* 7.8* 7.5*   HEMATOCRIT 26.0* 24.1* 23.4*   MCV 97.4 98.0* 99.2*   MCH 31.5 31.7 31.8   MCHC 32.3 32.4 32.1*   RDW 60.3* 61.3* 61.0*   PLATELETCT 288 290 297   MPV 10.1 10.2 10.5     Recent Labs     03/14/24  0342 03/15/24  0330 03/16/24  0020   SODIUM 137 138 139   POTASSIUM 3.5* 3.5* 3.7   CHLORIDE 114* 115* 114*   CO2 9* 9* 13*   GLUCOSE 97 172* 119*   BUN 18 15 17   CREATININE 0.78 0.77 0.97   CALCIUM 7.4* 7.8* 7.8*     Recent Labs     03/13/24  1238   APTT 26.9   INR 1.19*               Imaging  DX-CHEST-FOR LINE PLACEMENT Perform procedure in: Patient's Room   Final Result         1.  No acute cardiopulmonary disease.      DX-CHEST-PORTABLE (1 VIEW)   Final Result         1. No acute cardiopulmonary abnormalities are identified.      CT-ABDOMEN-PELVIS WITH   Final Result      1.  Nonobstructing left renal stones.   2.  Mild bilateral hydronephrosis.   3.  New borderline enlarged pelvic lymph nodes are nonspecific.   4.  Moderate colonic stool may represent constipation.   5.  Biliary dilatation again noted in this postcholecystectomy patient.           Assessment/Plan  * SIRS (systemic inflammatory response syndrome) (HCC)- (present on admission)  Assessment & Plan  3/16/2024  SIRS criteria identified on my evaluation include:  Tachycardia, with heart rate greater than 90 BPM and Leukocytosis, with WBC greater than 12,000  SIRS is non-infectious, the patient does not have sepsis  Chest x-ray did not show any signs of infiltration, denies any urinary symptoms  Blood cultures pending  Will start with cefepime and Flagyl, patient is allergic to  penicillin  Possible intra-abdominal pathology    Constipation- (present on admission)  Assessment & Plan  3/16/2024  Came with abdominal pain and CT scan showed likely constipation  Patient was treated for constipation last month  Patient is on chronic opioid  Patient states she is taking stool softener at home   Scheduled MiraLAX  Follow-up with x-ray    Normal anion gap metabolic acidosis- (present on admission)  Assessment & Plan  3/16/2024  Likely related to RTA  IV fluid and monitor with labs    Bipolar 1 disorder (HCC)- (present on admission)  Assessment & Plan  3/16/2024  Continue home medication Seroquel    Hypotension- (present on admission)  Assessment & Plan  3/16/2024  Patient has a chronic hypotension  However got worse likely related to vomiting  IV fluid  Close monitoring and start with pressors if needed   Start with antibiotics for possible sepsis and de-escalate antibiotic later    Severe protein-calorie malnutrition (HCC)- (present on admission)  Assessment & Plan  3/16/2024  Cachectic appearance and muscles loss  Nutrition consult    DNR (do not resuscitate)- (present on admission)  Assessment & Plan  The goal of care was discussed in detail with the patient, discussed the CODE STATUS, patient does not want to be intubated, patient wants to be DNR/DNI, time 20 minutes.    History of rheumatoid arthritis- (present on admission)  Assessment & Plan  3/16/2024  On chronic pain medication  Follow-up with rheumatologist  Usually is maintained with Enbrel  Start steroids for acute pain syndrome    Hypokalemia- (present on admission)  Assessment & Plan  3/16/2024  Likely related to vomiting  Replace IV, monitor phosphorus and magnesium    Leukocytosis- (present on admission)  Assessment & Plan  3/16/2024  Came with leukocytosis 16  Procalcitonin is elevated  No fever or chills  No source of infection however we will start with cefepime and Flagyl and de-escalate antibiotics as appropriate,  Cultures  pending  IV fluids        VTE prophylaxis: Lovenox    I have performed a physical exam and reviewed and updated ROS and Plan today (3/16/2024). In review of yesterday's note (3/15/2024), there are no changes except as documented above.      Greater than 52 minutes spent prepping to see patient (e.g. review of tests) obtaining and/or reviewing separately obtained history. Performing a medically appropriate examination and/ evaluation.  Counseling and educating the patient/family/caregiver.  Ordering medications, tests, or procedures.  Referring and communicating with other health care professionals.  Documenting clinical information in EPIC.  Independently interpreting results and communicating results to patient/family/caregiver.  Care coordination.

## 2024-03-16 NOTE — PROGRESS NOTES
Bedside report received from off going RN/tech: sita, assumed care of patient.     Fall Risk Score: HIGH RISK  Fall risk interventions in place: Place yellow fall risk ID band on patient, Provide patient/family education based on risk assessment, Educate patient/family to call staff for assistance when getting out of bed, Place fall precaution signage outside patient door, Place patient in room close to nursing station, and Utilize bed/chair fall alarm  Bed type: Regular (Zan Score less than 17 interventions in place)  Patient on cardiac monitor: Yes  IVF/IV medications: Infusion per MAR (List Med(s)) LR at 100/ hr   Oxygen: Room Air  Bedside sitter: Not Applicable   Isolation: Not applicable

## 2024-03-16 NOTE — PROGRESS NOTES
Monitor Summary  Rhythm: SR  Rate: 76-96  Ectopy: (R) PVC  No measurements present    NO STRIP UPLOADED

## 2024-03-17 ENCOUNTER — PHARMACY VISIT (OUTPATIENT)
Dept: PHARMACY | Facility: MEDICAL CENTER | Age: 54
End: 2024-03-17
Payer: COMMERCIAL

## 2024-03-17 VITALS
TEMPERATURE: 98.6 F | HEART RATE: 81 BPM | HEIGHT: 63 IN | SYSTOLIC BLOOD PRESSURE: 115 MMHG | BODY MASS INDEX: 19.45 KG/M2 | RESPIRATION RATE: 17 BRPM | WEIGHT: 109.79 LBS | DIASTOLIC BLOOD PRESSURE: 71 MMHG | OXYGEN SATURATION: 95 %

## 2024-03-17 LAB
ANION GAP SERPL CALC-SCNC: 10 MMOL/L (ref 7–16)
BASOPHILS # BLD AUTO: 0.1 % (ref 0–1.8)
BASOPHILS # BLD: 0.01 K/UL (ref 0–0.12)
BUN SERPL-MCNC: 15 MG/DL (ref 8–22)
CALCIUM SERPL-MCNC: 7.6 MG/DL (ref 8.5–10.5)
CHLORIDE SERPL-SCNC: 110 MMOL/L (ref 96–112)
CO2 SERPL-SCNC: 20 MMOL/L (ref 20–33)
CREAT SERPL-MCNC: 0.69 MG/DL (ref 0.5–1.4)
EOSINOPHIL # BLD AUTO: 0.02 K/UL (ref 0–0.51)
EOSINOPHIL NFR BLD: 0.2 % (ref 0–6.9)
ERYTHROCYTE [DISTWIDTH] IN BLOOD BY AUTOMATED COUNT: 57 FL (ref 35.9–50)
GFR SERPLBLD CREATININE-BSD FMLA CKD-EPI: 104 ML/MIN/1.73 M 2
GLUCOSE SERPL-MCNC: 81 MG/DL (ref 65–99)
HCT VFR BLD AUTO: 24.9 % (ref 37–47)
HGB BLD-MCNC: 8.3 G/DL (ref 12–16)
IMM GRANULOCYTES # BLD AUTO: 0.06 K/UL (ref 0–0.11)
IMM GRANULOCYTES NFR BLD AUTO: 0.6 % (ref 0–0.9)
LYMPHOCYTES # BLD AUTO: 2.91 K/UL (ref 1–4.8)
LYMPHOCYTES NFR BLD: 28.1 % (ref 22–41)
MAGNESIUM SERPL-MCNC: 1.9 MG/DL (ref 1.5–2.5)
MCH RBC QN AUTO: 31.6 PG (ref 27–33)
MCHC RBC AUTO-ENTMCNC: 33.3 G/DL (ref 32.2–35.5)
MCV RBC AUTO: 94.7 FL (ref 81.4–97.8)
MONOCYTES # BLD AUTO: 0.75 K/UL (ref 0–0.85)
MONOCYTES NFR BLD AUTO: 7.2 % (ref 0–13.4)
NEUTROPHILS # BLD AUTO: 6.6 K/UL (ref 1.82–7.42)
NEUTROPHILS NFR BLD: 63.8 % (ref 44–72)
NRBC # BLD AUTO: 0 K/UL
NRBC BLD-RTO: 0 /100 WBC (ref 0–0.2)
PHOSPHATE SERPL-MCNC: 1.5 MG/DL (ref 2.5–4.5)
PLATELET # BLD AUTO: 317 K/UL (ref 164–446)
PMV BLD AUTO: 10.2 FL (ref 9–12.9)
POTASSIUM SERPL-SCNC: 3.2 MMOL/L (ref 3.6–5.5)
RBC # BLD AUTO: 2.63 M/UL (ref 4.2–5.4)
SODIUM SERPL-SCNC: 140 MMOL/L (ref 135–145)
WBC # BLD AUTO: 10.4 K/UL (ref 4.8–10.8)

## 2024-03-17 PROCEDURE — 97162 PT EVAL MOD COMPLEX 30 MIN: CPT

## 2024-03-17 PROCEDURE — 84100 ASSAY OF PHOSPHORUS: CPT

## 2024-03-17 PROCEDURE — A9270 NON-COVERED ITEM OR SERVICE: HCPCS | Performed by: NURSE PRACTITIONER

## 2024-03-17 PROCEDURE — 97530 THERAPEUTIC ACTIVITIES: CPT

## 2024-03-17 PROCEDURE — RXMED WILLOW AMBULATORY MEDICATION CHARGE: Performed by: INTERNAL MEDICINE

## 2024-03-17 PROCEDURE — 99239 HOSP IP/OBS DSCHRG MGMT >30: CPT | Performed by: INTERNAL MEDICINE

## 2024-03-17 PROCEDURE — A9270 NON-COVERED ITEM OR SERVICE: HCPCS | Mod: JZ | Performed by: INTERNAL MEDICINE

## 2024-03-17 PROCEDURE — 700111 HCHG RX REV CODE 636 W/ 250 OVERRIDE (IP): Performed by: INTERNAL MEDICINE

## 2024-03-17 PROCEDURE — 700101 HCHG RX REV CODE 250: Performed by: INTERNAL MEDICINE

## 2024-03-17 PROCEDURE — 700102 HCHG RX REV CODE 250 W/ 637 OVERRIDE(OP): Performed by: INTERNAL MEDICINE

## 2024-03-17 PROCEDURE — 97166 OT EVAL MOD COMPLEX 45 MIN: CPT

## 2024-03-17 PROCEDURE — 700102 HCHG RX REV CODE 250 W/ 637 OVERRIDE(OP): Performed by: NURSE PRACTITIONER

## 2024-03-17 PROCEDURE — 83735 ASSAY OF MAGNESIUM: CPT

## 2024-03-17 PROCEDURE — 700105 HCHG RX REV CODE 258: Performed by: INTERNAL MEDICINE

## 2024-03-17 PROCEDURE — A9270 NON-COVERED ITEM OR SERVICE: HCPCS | Performed by: INTERNAL MEDICINE

## 2024-03-17 PROCEDURE — 700105 HCHG RX REV CODE 258: Performed by: HOSPITALIST

## 2024-03-17 PROCEDURE — 80048 BASIC METABOLIC PNL TOTAL CA: CPT

## 2024-03-17 PROCEDURE — 700102 HCHG RX REV CODE 250 W/ 637 OVERRIDE(OP): Mod: JZ | Performed by: INTERNAL MEDICINE

## 2024-03-17 PROCEDURE — 85025 COMPLETE CBC W/AUTO DIFF WBC: CPT

## 2024-03-17 RX ORDER — POLYETHYLENE GLYCOL 3350 17 G/17G
17 POWDER, FOR SOLUTION ORAL DAILY
Qty: 10 EACH | Refills: 0 | Status: SHIPPED | OUTPATIENT
Start: 2024-03-18

## 2024-03-17 RX ORDER — LEVOTHYROXINE SODIUM 0.07 MG/1
75 TABLET ORAL
Qty: 30 TABLET | Refills: 0 | Status: SHIPPED | OUTPATIENT
Start: 2024-03-18

## 2024-03-17 RX ORDER — LANOLIN ALCOHOL/MO/W.PET/CERES
400 CREAM (GRAM) TOPICAL 2 TIMES DAILY
Status: DISCONTINUED | OUTPATIENT
Start: 2024-03-17 | End: 2024-03-17 | Stop reason: HOSPADM

## 2024-03-17 RX ORDER — NICOTINE 21 MG/24HR
1 PATCH, TRANSDERMAL 24 HOURS TRANSDERMAL EVERY 24 HOURS
Qty: 28 PATCH | Refills: 0 | Status: SHIPPED | OUTPATIENT
Start: 2024-03-17

## 2024-03-17 RX ORDER — AMOXICILLIN 250 MG
2 CAPSULE ORAL 2 TIMES DAILY
Qty: 30 TABLET | Refills: 0 | Status: SHIPPED | OUTPATIENT
Start: 2024-03-17

## 2024-03-17 RX ORDER — OXYCODONE HYDROCHLORIDE 5 MG/1
5 TABLET ORAL EVERY 6 HOURS PRN
Qty: 10 TABLET | Refills: 0 | Status: SHIPPED | OUTPATIENT
Start: 2024-03-17 | End: 2024-03-22

## 2024-03-17 RX ORDER — CIPROFLOXACIN 500 MG/1
500 TABLET, FILM COATED ORAL 2 TIMES DAILY
Qty: 10 TABLET | Refills: 0 | Status: ACTIVE | OUTPATIENT
Start: 2024-03-17 | End: 2024-03-22

## 2024-03-17 RX ORDER — METRONIDAZOLE 500 MG/1
500 TABLET ORAL EVERY 12 HOURS
Qty: 10 TABLET | Refills: 0 | Status: ACTIVE | OUTPATIENT
Start: 2024-03-17 | End: 2024-03-22

## 2024-03-17 RX ORDER — POTASSIUM CHLORIDE 20 MEQ/1
20 TABLET, EXTENDED RELEASE ORAL ONCE
Status: COMPLETED | OUTPATIENT
Start: 2024-03-17 | End: 2024-03-17

## 2024-03-17 RX ORDER — MIDODRINE HYDROCHLORIDE 5 MG/1
5 TABLET ORAL
Qty: 90 TABLET | Refills: 0 | Status: SHIPPED | OUTPATIENT
Start: 2024-03-17

## 2024-03-17 RX ADMIN — HYDROMORPHONE HYDROCHLORIDE 1 MG: 1 INJECTION, SOLUTION INTRAMUSCULAR; INTRAVENOUS; SUBCUTANEOUS at 07:58

## 2024-03-17 RX ADMIN — OMEPRAZOLE 40 MG: 20 CAPSULE, DELAYED RELEASE ORAL at 06:26

## 2024-03-17 RX ADMIN — PROCHLORPERAZINE EDISYLATE 10 MG: 5 INJECTION INTRAMUSCULAR; INTRAVENOUS at 08:09

## 2024-03-17 RX ADMIN — Medication 400 MG: at 06:26

## 2024-03-17 RX ADMIN — METRONIDAZOLE 500 MG: 500 TABLET ORAL at 06:26

## 2024-03-17 RX ADMIN — NICOTINE 14 MG: 14 PATCH TRANSDERMAL at 06:25

## 2024-03-17 RX ADMIN — OXYCODONE HYDROCHLORIDE 10 MG: 10 TABLET ORAL at 02:50

## 2024-03-17 RX ADMIN — POTASSIUM CHLORIDE 20 MEQ: 1500 TABLET, EXTENDED RELEASE ORAL at 06:26

## 2024-03-17 RX ADMIN — SODIUM CHLORIDE, POTASSIUM CHLORIDE, SODIUM LACTATE AND CALCIUM CHLORIDE 1000 ML: 600; 310; 30; 20 INJECTION, SOLUTION INTRAVENOUS at 06:24

## 2024-03-17 RX ADMIN — HYDROMORPHONE HYDROCHLORIDE 1 MG: 1 INJECTION, SOLUTION INTRAMUSCULAR; INTRAVENOUS; SUBCUTANEOUS at 14:32

## 2024-03-17 RX ADMIN — HYDROMORPHONE HYDROCHLORIDE 1 MG: 1 INJECTION, SOLUTION INTRAMUSCULAR; INTRAVENOUS; SUBCUTANEOUS at 03:56

## 2024-03-17 RX ADMIN — OXYCODONE HYDROCHLORIDE 10 MG: 10 TABLET ORAL at 06:50

## 2024-03-17 RX ADMIN — ALPRAZOLAM 0.25 MG: 0.25 TABLET ORAL at 06:29

## 2024-03-17 RX ADMIN — SODIUM BICARBONATE 650 MG: 650 TABLET ORAL at 07:58

## 2024-03-17 RX ADMIN — OXYCODONE HYDROCHLORIDE 10 MG: 10 TABLET ORAL at 12:24

## 2024-03-17 RX ADMIN — LEVOTHYROXINE SODIUM 75 MCG: 0.07 TABLET ORAL at 06:26

## 2024-03-17 RX ADMIN — PROCHLORPERAZINE EDISYLATE 5 MG: 5 INJECTION INTRAMUSCULAR; INTRAVENOUS at 04:01

## 2024-03-17 RX ADMIN — ACETAMINOPHEN 650 MG: 325 TABLET, FILM COATED ORAL at 06:29

## 2024-03-17 RX ADMIN — DIBASIC SODIUM PHOSPHATE, MONOBASIC POTASSIUM PHOSPHATE AND MONOBASIC SODIUM PHOSPHATE 500 MG: 852; 155; 130 TABLET ORAL at 06:26

## 2024-03-17 RX ADMIN — PROCHLORPERAZINE EDISYLATE 5 MG: 5 INJECTION INTRAMUSCULAR; INTRAVENOUS at 14:33

## 2024-03-17 RX ADMIN — DIBASIC SODIUM PHOSPHATE, MONOBASIC POTASSIUM PHOSPHATE AND MONOBASIC SODIUM PHOSPHATE 500 MG: 852; 155; 130 TABLET ORAL at 12:24

## 2024-03-17 RX ADMIN — CEFEPIME 2 G: 2 INJECTION, POWDER, FOR SOLUTION INTRAVENOUS at 06:26

## 2024-03-17 RX ADMIN — POTASSIUM PHOSPHATE, MONOBASIC AND POTASSIUM PHOSPHATE, DIBASIC 30 MMOL: 224; 236 INJECTION, SOLUTION, CONCENTRATE INTRAVENOUS at 06:33

## 2024-03-17 RX ADMIN — DOCUSATE SODIUM 50 MG AND SENNOSIDES 8.6 MG 2 TABLET: 8.6; 5 TABLET, FILM COATED ORAL at 06:26

## 2024-03-17 ASSESSMENT — COGNITIVE AND FUNCTIONAL STATUS - GENERAL
HELP NEEDED FOR BATHING: A LOT
MOBILITY SCORE: 11
TOILETING: A LOT
WALKING IN HOSPITAL ROOM: TOTAL
EATING MEALS: A LITTLE
DRESSING REGULAR LOWER BODY CLOTHING: A LOT
STANDING UP FROM CHAIR USING ARMS: A LOT
DAILY ACTIVITIY SCORE: 15
MOVING TO AND FROM BED TO CHAIR: A LOT
TURNING FROM BACK TO SIDE WHILE IN FLAT BAD: A LITTLE
SUGGESTED CMS G CODE MODIFIER DAILY ACTIVITY: CK
SUGGESTED CMS G CODE MODIFIER MOBILITY: CL
CLIMB 3 TO 5 STEPS WITH RAILING: TOTAL
DRESSING REGULAR UPPER BODY CLOTHING: A LITTLE
MOVING FROM LYING ON BACK TO SITTING ON SIDE OF FLAT BED: A LOT
PERSONAL GROOMING: A LITTLE

## 2024-03-17 ASSESSMENT — PAIN DESCRIPTION - PAIN TYPE
TYPE: ACUTE PAIN

## 2024-03-17 ASSESSMENT — ACTIVITIES OF DAILY LIVING (ADL): TOILETING: INDEPENDENT

## 2024-03-17 ASSESSMENT — FIBROSIS 4 INDEX: FIB4 SCORE: 0.61

## 2024-03-17 ASSESSMENT — GAIT ASSESSMENTS: GAIT LEVEL OF ASSIST: UNABLE TO PARTICIPATE

## 2024-03-17 NOTE — PROGRESS NOTES
Received report from previous shift RN, assumed care of patient. Patient is A&Ox4, vital signs are stable, on room air. Patient reports 9/10 pain at this time, medicated per mar. Sitting up in bed for breakfast. Call light and belongings within reach. Bed in lowest/locked position. Bed alarm on.         Fall Risk Score: HIGH RISK  Fall risk interventions in place: Place yellow fall risk ID band on patient, Provide patient/family education based on risk assessment, Educate patient/family to call staff for assistance when getting out of bed, Place fall precaution signage outside patient door, Place patient in room close to nursing station, Utilize bed/chair fall alarm, and Notify charge of high risk for huddle  Bed type: Regular and Waffle cushion (Zan Score less than 17 interventions in place)  Patient on cardiac monitor: Yes  IVF/IV medications: Infusion per MAR (List Med(s)) LR  Oxygen: Room Air  Bedside sitter: Not Applicable   Isolation: Not applicable

## 2024-03-17 NOTE — THERAPY
Physical Therapy   Initial Evaluation     Patient Name: Mary Martinez  Age:  53 y.o., Sex:  female  Medical Record #: [id number]  Today's Date: 3/17/2024     Precautions  Precautions: Fall Risk  Comments: severe RA    Assessment  Patient is 53 y.o. female admitted with abdominal pain, nausea, and GLF. Pt found to have SIRS, constipation, metabolic acidosis, and hypotension. PMH includes AD, chronic pain, depression, extensive GI pathology, severe protein calorie malnutrition. Pt presented willing to work with therapy. She is limited in her mobility due to pain, ROM deficits, balance deficits, weakness, and decreased activity tolerance. Pt was provided with verbal cues and physical assist to perform bed mob and transfers. Attempted gait with FWW but pt was unable to progress LLE. PT will cont while pt is in acute care setting to address deficits.     Plan    Physical Therapy Initial Treatment Plan   Treatment Plan : Bed Mobility, Gait Training, Neuro Re-Education / Balance, Self Care / Home Evaluation, Therapeutic Exercise, Therapeutic Activities, Equipment  Treatment Frequency: 3 Times per Week  Duration: Until Therapy Goals Met    DC Equipment Recommendations: Unable to determine at this time  Discharge Recommendations: Recommend post-acute placement for additional physical therapy services prior to discharge home          03/17/24 0925   Pain 0 - 10 Group   Therapist Pain Assessment During Activity;Nurse Notified  (min pain noted with mobility)   Non Verbal Descriptors   Non Verbal Scale  Calm   Prior Living Situation   Prior Services Intermittent Physical Support for ADL Per Family   Housing / Facility 1 Story House   Steps Into Home 1   Steps In Home 0   Bathroom Set up Bathtub / Shower Combination   Equipment Owned Front-Wheel Walker;Wheelchair;4-Wheel Walker   Lives with - Patient's Self Care Capacity Adult Children;Child Less than 18 Years of Age   Comments pt lives with her 5 children who assist her as  needed. She reports her mobility depends on the day but typically she is able to ambulate household distances with her 4WW. Occaisionally she has to rely on her WC for mobility   Prior Level of Functional Mobility   Bed Mobility Independent   Transfer Status Independent   Ambulation Independent   Ambulation Distance household   Assistive Devices Used 4-Wheel Walker   Comments pts family occaisionally has to provide physical assist   History of Falls   History of Falls Yes   Cognition    Level of Consciousness Alert   Strength Upper Body   Upper Body Strength  X   Comments generally weak, RA deformities and finger amputations   Active ROM Lower Body    Active ROM Lower Body  X   Comments lacking terminal extension in B knees   Strength Lower Body   Lower Body Strength  X   Gross Strength Generalized Weakness, Equal Bilaterally   Sensation Lower Body   Lower Extremity Sensation   WDL   Other Treatments   Other Treatments Provided pt participated in therapeutic activities. Pt was provided verbal cues and assistance for STS 2x and SPT to recliner. Attempted gait but pt was unable to progress L LE despite assist and cues   Balance Assessment   Sitting Balance (Static) Fair   Sitting Balance (Dynamic) Fair   Standing Balance (Static) Poor   Standing Balance (Dynamic) Poor -   Weight Shift Sitting Fair   Weight Shift Standing Poor   Comments FWW   Bed Mobility    Supine to Sit Minimal Assist   Scooting Minimal Assist   Comments HOB raised   Gait Analysis   Gait Level Of Assist Unable to Participate   Functional Mobility   Sit to Stand Moderate Assist   Bed, Chair, Wheelchair Transfer Moderate Assist   Transfer Method Stand Pivot   Mobility bed mob, transfers   Edema / Skin Assessment   Edema / Skin  Not Assessed   Patient / Family Goals    Patient / Family Goal #1 none stated   Short Term Goals    Short Term Goal # 1 pt will be able to complete supine<>sitting from flat bed with SPV in 6tx in order to improve independence    Short Term Goal # 2 pt will be able to complete STS with FWW and SPV in 6tx in order to improve OOB mobility   Short Term Goal # 3 pt will be able to complete SPT from bed<>chair with FWW and SPV in 6tx in order to increase OOB mobility   Short Term Goal # 4 pt will be able to ambulate 25ft with FWW and CGA in 6tx in order to progress to prior level   Anticipated Discharge Equipment and Recommendations   DC Equipment Recommendations Unable to determine at this time   Discharge Recommendations Recommend post-acute placement for additional physical therapy services prior to discharge home

## 2024-03-17 NOTE — DISCHARGE INSTRUCTIONS
Discharge Instructions per DEEJAY Motley.O.    DIET: Diet Order Diet: Regular    ACTIVITY: As tolerated    A proper diet that is low in grease, fat, and salt, along with 30 minutes of exercise per day will lead to weight loss, and better controlled blood sugar and blood pressure.    DIAGNOSIS: SIRS (systemic inflammatory response syndrome) (HCC)    Follow up with your Primary Care Provider Stormy Gotti M.D. as scheduled or sooner if your symptoms persist or worsen.  Return to Emergency Room for sever chest pain, shortness of breath, signs of a stroke, or any other emergencies.

## 2024-03-17 NOTE — DISCHARGE SUMMARY
Discharge Summary    CHIEF COMPLAINT ON ADMISSION  Chief Complaint   Patient presents with    Abdominal Pain     Pt BIBA. States they have been having abd pain x 3 weeks. Blood in stool last thursday Pt had a fall today, (-) LOC (-) trauma       Reason for Admission  EMS     Admission Date  3/13/2024    CODE STATUS  DNAR/DNI    HPI & HOSPITAL COURSE  Mary Martinez is a 54-year-old female with a complicated medical history including rheumatoid arthritis on Enbrel, chronic pain syndrome, history of depression, history of extensive GI pathology and history, presenting on 3/13 with abdominal pain, onset was fairly sudden after eating lunch, nausea, abdominal pain persisted.  The patient denies fevers or chills or any additional pain.  Patient was recently admitted for similar presentation where the patient was diagnosed with opioid-induced constipation.  The patient reports taking stool softeners daily.  The patient has a history of alcoholism, and tobacco abuse.  She quit several months ago.  On admission the patient was felt to be dehydrated, had a low blood pressure with a blood pressure of 77/51.  Identified to have leukocytosis, a potassium of 2.4, bicarb of 7 and a creatinine of 0.9, indicating metabolic acidosis with no anion gap.   The patient had unremarkable urinalysis, significant elevated procalcitonin, cultures were taken and so far negative including blood culture, urine culture,  Chest x-ray showed no acute pathology,  CT abdomen showed nonobstructing left renal stones, mild bilateral hydronephrosis, new borderline enlarged pelvic lymph nodes are nonspecific, moderate colonic stool, biliary dilatation again noted in this postcholecystectomy patient.     Patient continues to have severe abdominal pain.  She does have prior history of pancreatitis, small bowel obstruction, SMA syndrome.  Besides her home oxycodone she was using IV Dilaudid.  She was on aggressive bowel regimen.  After given a dose of  "Relistor she finally had a good bowel movement and abdominal pain was improved.       Patient also has chronic rheumatoid arthritis awaiting insurance authorization of Enbrel     Patient was hypotensive.  There was concern for adrenal insufficiency.  Patient states she is chronically hypotensive.  She was taken off stress dose steroids and started on midodrine.  She was also found to be hypothyroid and started on levothyroxine.     Patient does have chronic acidosis, hypokalemia, hypophosphatemia.  High suspicion for renal tubular acidosis.  Her electrolytes were repleted aggressively along with fluid resuscitation.  Counseled about opiate usage and importance of bowel regimen.     Patient initially presented with SIRS with significantly elevated leukocytosis as well as procalcitonin.  She had a fever on 3/15.  This was assumed to be a gastrointestinal source of infection although CT abdomen pelvis was generally unrevealing and all cultures have been negative.  Procalcitonin did improve with cefepime and metronidazole and leukocytosis resolved.  Symptomatology also improved.  Patient had no further fevers.  She has a penicillin allergy, she will be discharged with ciprofloxacin and metronidazole to complete 10-day course.    PT/OT recommended postacute placement.  Patient refused placement.  She states that she has rheumatoid arthritis and that she is at her baseline.  She has a wheelchair and walker at home.  States that she is \"wobbly\" but she never falls.  She is at her functional baseline and does not want to go to skilled nursing facility.  She does have 2 sons that she lives with.     Ordered home health which unfortunately cannot be confirmed over the weekend.  Medically stable to discharge home.  Follow-up with primary care as outpatient.      Therefore, she is discharged in guarded and stable condition to home with organized home healthcare and close outpatient follow-up.    The patient met 2-midnight " criteria for an inpatient stay at the time of discharge.    Discharge Date  3/17/2024      FOLLOW UP ITEMS POST DISCHARGE  None    DISCHARGE DIAGNOSES  Principal Problem:    SIRS (systemic inflammatory response syndrome) (HCC) (POA: Yes)  Active Problems:    Hypokalemia (POA: Yes)    History of rheumatoid arthritis (POA: Yes)    DNR (do not resuscitate) (POA: Yes)    Severe protein-calorie malnutrition (HCC) (POA: Yes)    Hypotension (POA: Yes)    Bipolar 1 disorder (HCC) (POA: Yes)    Normal anion gap metabolic acidosis (POA: Yes)    Constipation (POA: Yes)  Resolved Problems:    Leukocytosis (POA: Yes)      FOLLOW UP  Future Appointments   Date Time Provider Department Center   3/21/2024  1:00 PM Stormy Gotti M.D. [facility] Gotti M.D.  [address]  [place] [id number]  [phone]    Follow up        MEDICATIONS ON DISCHARGE     Medication List        START taking these medications        Instructions   ciprofloxacin 500 MG Tabs  Commonly known as: Cipro   Take 1 Tablet by mouth 2 times a day for 5 days.  Dose: 500 mg     levothyroxine 75 MCG Tabs  Start taking on: March 18, 2024  Commonly known as: Synthroid   Take 1 Tablet by mouth every morning on an empty stomach.  Dose: 75 mcg     metroNIDAZOLE 500 MG Tabs  Commonly known as: Flagyl   Take 1 Tablet by mouth every 12 hours for 5 days.  Dose: 500 mg     midodrine 5 MG Tabs  Commonly known as: Proamatine   Take 1 Tablet by mouth 3 times a day with meals.  Dose: 5 mg     nicotine 14 MG/24HR Pt24  Commonly known as: Nicoderm   Place 1 Patch on the skin every 24 hours.  Dose: 1 Patch     oxyCODONE immediate-release 5 MG Tabs  Commonly known as: Roxicodone   Take 1 Tablet by mouth every 6 hours as needed for Severe Pain for up to 5 days.  Dose: 5 mg     phosphorus 250 MG tablet  Commonly known as: K-Phos-Neutral   Take 1 Tablet by mouth every day at 6 PM.  Dose: 1 Tablet     polyethylene glycol/lytes Pack  Start taking on: March 18,  2024  Commonly known as: Miralax   Take 1 Packet by mouth every day.  Dose: 17 g     potassium bicarbonate 25 MEQ tablet  Commonly known as: Klyte   Take 1 Tablet by mouth every day.  Dose: 25 mEq     senna-docusate 8.6-50 MG Tabs  Commonly known as: Pericolace Or Senokot S   Take 2 Tablets by mouth 2 times a day.  Dose: 2 Tablet            CHANGE how you take these medications        Instructions   PARoxetine 30 MG Tabs  What changed: See the new instructions.  Commonly known as: Paxil   TAKE 2 TABLETS(60 MG) BY MOUTH EVERY EVENING            CONTINUE taking these medications        Instructions   acetaminophen 325 MG Tabs  Commonly known as: Tylenol   Take 325-650 mg by mouth every 6 hours as needed. Indications: Pain  Dose: 325-650 mg     metoclopramide 10 MG Tabs  Commonly known as: Reglan   Doctor's comments: Please substitute with an available or covered quantity or equivalent alternative if necessary.  Take 1 Tablet by mouth 4 times a day as needed (nausea and vomitng).  Dose: 10 mg     omeprazole 40 MG delayed-release capsule  Commonly known as: PriLOSEC   Take 40 mg by mouth 2 times a day.  Dose: 40 mg     ondansetron 4 MG Tbdp  Commonly known as: Zofran ODT   Doctor's comments: Please substitute with an available or covered quantity or equivalent alternative if necessary.  Take 1 Tablet by mouth every 6 hours as needed for Nausea/Vomiting.  Dose: 4 mg     * oxyCODONE-acetaminophen  MG Tabs  Commonly known as: Percocet-10   Doctor's comments: Please substitute with an available or covered quantity or equivalent alternative if necessary.  Take 1 Tablet by mouth every four hours as needed for Severe Pain for up to 30 days. Indications: Pain  Dose: 1 Tablet     * oxyCODONE-acetaminophen  MG Tabs  Start taking on: March 18, 2024  Commonly known as: Percocet   Doctor's comments: Please substitute with an available or covered quantity or equivalent alternative if necessary.  Take 1 Tablet by mouth  every four hours as needed for Severe Pain for up to 30 days. Indications: Pain  Dose: 1 Tablet     * oxyCODONE-acetaminophen  MG Tabs  Start taking on: April 18, 2024  Commonly known as: Percocet-10   Doctor's comments: Please substitute with an available or covered quantity or equivalent alternative if necessary.  Take 1 Tablet by mouth every four hours as needed for Severe Pain for up to 30 days. Indications: Pain  Dose: 1 Tablet     QUEtiapine 100 MG Tabs  Commonly known as: SEROquel   Take 1 Tablet by mouth every evening.  Dose: 100 mg           * This list has 3 medication(s) that are the same as other medications prescribed for you. Read the directions carefully, and ask your doctor or other care provider to review them with you.                  Allergies  Allergies   Allergen Reactions    Penicillins Anaphylaxis and Hives     Tolerates cephalosporins; reports throat swelling with PCN    Aripiprazole Nausea     Spasms, shaking      Nitrous Oxide Vomiting       DIET  Orders Placed This Encounter   Procedures    Diet Order Diet: Regular     Standing Status:   Standing     Number of Occurrences:   1     Order Specific Question:   Diet:     Answer:   Regular [1]       ACTIVITY  As tolerated.  Weight bearing as tolerated    CONSULTATIONS  None    PROCEDURES  None    LABORATORY  Lab Results   Component Value Date    SODIUM 140 03/17/2024    POTASSIUM 3.2 (L) 03/17/2024    CHLORIDE 110 03/17/2024    CO2 20 03/17/2024    GLUCOSE 81 03/17/2024    BUN 15 03/17/2024    CREATININE 0.69 03/17/2024    CREATININE 0.7 11/29/2008        Lab Results   Component Value Date    WBC 10.4 03/17/2024    HEMOGLOBIN 8.3 (L) 03/17/2024    HEMATOCRIT 24.9 (L) 03/17/2024    PLATELETCT 317 03/17/2024        I discussed medications and side effects with the patient.  I discussed prognosis and importance of medical compliance with the patient.  I counseled the patient about diet, exercise, weight loss, smoking cessation, and life  style modifications.  All questions and concerns have been addressed.  Total time of the discharge process was 37 minutes.

## 2024-03-17 NOTE — CARE PLAN
The patient is Stable - Low risk of patient condition declining or worsening    Shift Goals  Clinical Goals: Pain management  Patient Goals: Pain management  Family Goals: NAIN    Progress made toward(s) clinical / shift goals:  Managing pain per MAR.   Problem: Pain - Standard  Goal: Alleviation of pain or a reduction in pain to the patient’s comfort goal  Description: Target End Date:  Prior to discharge or change in level of care    Document on Vitals flowsheet    1.  Document pain using the appropriate pain scale per order or unit policy  2.  Educate and implement non-pharmacologic comfort measures (i.e. relaxation, distraction, massage, cold/heat therapy, etc.)  3.  Pain management medications as ordered  4.  Reassess pain after pain med administration per policy  5.  If opiods administered assess patient's response to pain medication is appropriate per POSS sedation scale  6.  Follow pain management plan developed in collaboration with patient and interdisciplinary team (including palliative care or pain specialists if applicable)  Outcome: Progressing  Note: Patient reports constant abd pain. Review pain scale with patient and pain medications available and the timing for medications. Patient stated understanding of these teachings.      Problem: Knowledge Deficit - Standard  Goal: Patient and family/care givers will demonstrate understanding of plan of care, disease process/condition, diagnostic tests and medications  Description: Target End Date:  1-3 days or as soon as patient condition allows    Document in Patient Education    1.  Patient and family/caregiver oriented to unit, equipment, visitation policy and means for communicating concern  2.  Complete/review Learning Assessment  3.  Assess knowledge level of disease process/condition, treatment plan, diagnostic tests and medications  4.  Explain disease process/condition, treatment plan, diagnostic tests and medications  Outcome: Progressing  Note:  Patient is aware of treatment as she has had many hospitalizations prior. Patient Listens to teachings and is co-operative.        Patient is not progressing towards the following goals:

## 2024-03-17 NOTE — PROGRESS NOTES
Pt DC home. CVC and telebox removed. Pt given copy of discharge instructions. Discussed medications with pt and the controlled substance form was discussed with pt. Instructed pt on follow-up appointments and when to return to the ER with worsening symptoms. Instructed pt to  medications from [facility] as pt and family preferred to pick them up later. Signed copy of dc instructions in chart. Pt taken down in wheel chair with staff and family member.

## 2024-03-17 NOTE — THERAPY
Occupational Therapy   Initial Evaluation     Patient Name: Mary Martinez  Age:  53 y.o., Sex:  female  Medical Record #: [id number]  Today's Date: 3/17/2024     Precautions  Precautions: (P) Fall Risk    Assessment    Patient is 53 y.o. female admitted for abdominal pain, nausea, fall; PMH of extensive GI history, constipation, bipolar, hypotension. Pt states normally independent with basic ADLs and functional mobility living in a [facility] with children who assist with IADLs and intermittently ADLs. Pt able to complete bed mobility with supervision, min-modA required for transfers, maxA for lower body ADLs and toileting. Will continue to see for skilled therapy while admitted as well as recommend post-acute placement.      Plan    Occupational Therapy Initial Treatment Plan   Treatment Interventions: (P) Self Care / Activities of Daily Living, Adaptive Equipment, Neuro Re-Education / Balance, Manual Therapy Techniques, Therapeutic Exercises, Therapeutic Activity  Treatment Frequency: (P) 3 Times per Week  Duration: (P) Until Therapy Goals Met    DC Equipment Recommendations: (P) Unable to determine at this time  Discharge Recommendations: (P) Recommend post-acute placement for additional occupational therapy services prior to discharge home     Objective       03/17/24 0920   Prior Living Situation   Prior Services Intermittent Physical Support for ADL Per Family   Housing / Facility 1 Story House   Bathroom Set up Bathtub / Shower Combination;Grab Bars   Equipment Owned Front-Wheel Walker;Wheelchair;Tub / Shower Seat;Grab Bar(s) In Tub / Shower;Grab Bar(s) By Toilet   Prior Level of ADL Function   Self Feeding Independent   Grooming / Hygiene Independent   Bathing Independent   Dressing Independent   Toileting Independent   Prior Level of IADL Function   Medication Management Independent   Laundry Requires Assist   Kitchen Mobility Requires Assist   Finances Requires Assist   Home Management Requires Assist    Shopping Requires Assist   Prior Level Of Mobility Independent With Device in Home   Driving / Transportation Relatives / Others Provide Transportation   Occupation (Pre-Hospital Vocational) Retired Due To Disability   History of Falls   History of Falls Yes   Precautions   Precautions Fall Risk   Pain 0 - 10 Group   Therapist Pain Assessment Post Activity Pain Same as Prior to Activity;Nurse Notified   Cognition    Cognition / Consciousness WDL   Level of Consciousness Alert   Active ROM Upper Body   Active ROM Upper Body  WDL   Strength Upper Body   Comments multiple amputations and RA deformities, pt not formally tested   Sensation Upper Body   Upper Extremity Sensation  WDL   Upper Body Muscle Tone   Upper Body Muscle Tone  WDL   Neurological Concerns   Neurological Concerns No   Coordination Upper Body   Coordination WDL   Balance Assessment   Sitting Balance (Static) Fair   Sitting Balance (Dynamic) Fair   Standing Balance (Static) Fair -   Standing Balance (Dynamic) Poor -   Weight Shift Sitting Fair   Weight Shift Standing Poor   Comments w/ FWW   Bed Mobility    Supine to Sit Contact Guard Assist   Scooting Contact Guard Assist   Rolling Contact Guard Assist   ADL Assessment   Grooming Supervision;Seated   Upper Body Dressing Supervision   Lower Body Dressing Maximal Assist   How much help from another person does the patient currently need...   Putting on and taking off regular lower body clothing? 2   Bathing (including washing, rinsing, and drying)? 2   Toileting, which includes using a toilet, bedpan, or urinal? 2   Putting on and taking off regular upper body clothing? 3   Taking care of personal grooming such as brushing teeth? 3   Eating meals? 3   6 Clicks Daily Activity Score 15   Functional Mobility   Sit to Stand Minimal Assist   Bed, Chair, Wheelchair Transfer Moderate Assist   Mobility bed mobility, STS at EOB, transfer to chair   Activity Tolerance   Sitting in Chair left seated in chair    Sitting Edge of Bed 10 min   Standing 5 min   Short Term Goals   Short Term Goal # 1 Pt will complete ADL transfers with supervision   Short Term Goal # 2 Pt will complete LB dressing with supervision   Short Term Goal # 3 Pt will complete toileting with supervision   Education Group   Education Provided Role of Occupational Therapist   Role of Occupational Therapist Patient Response Patient;Acceptance;Explanation   Occupational Therapy Initial Treatment Plan    Treatment Interventions Self Care / Activities of Daily Living;Adaptive Equipment;Neuro Re-Education / Balance;Manual Therapy Techniques;Therapeutic Exercises;Therapeutic Activity   Treatment Frequency 3 Times per Week   Duration Until Therapy Goals Met   Problem List   Problem List Decreased Active Daily Living Skills;Decreased Homemaking Skills;Decreased Upper Extremity Strength Right;Decreased Upper Extremity Strength Left;Decreased Functional Mobility;Decreased Activity Tolerance;Impaired Postural Control / Balance   Anticipated Discharge Equipment and Recommendations   DC Equipment Recommendations Unable to determine at this time   Discharge Recommendations Recommend post-acute placement for additional occupational therapy services prior to discharge home   Interdisciplinary Plan of Care Collaboration   IDT Collaboration with  Nursing;Physical Therapist   Patient Position at End of Therapy Seated;Chair Alarm On;Call Light within Reach;Tray Table within Reach;Phone within Reach   Collaboration Comments RN updated

## 2024-03-17 NOTE — FACE TO FACE
Face to Face Supporting Documentation - Home Health    The encounter with this patient was in whole or in part the primary reason for home health admission.    Date of encounter:   Patient:                    MRN:                       YOB: 2022  Mary Martinez  [id number]  1970     Home health to see patient for:  Skilled Nursing care for assessment, interventions & education    Skilled need for:  Exacerbation of Chronic Disease State rheumatoid arthritis, abdominal pain      Homebound status evidenced by:  Needs the assistance of another person in order to leave the home. Leaving home requires a considerable and taxing effort. There is a normal inability to leave the home.    Community Physician to provide follow up care: Stormy Gotti M.D.     Optional Interventions? No      I certify the face to face encounter for this home health care referral meets the CMS requirements and the encounter/clinical assessment with the patient was, in whole, or in part, for the medical condition(s) listed above, which is the primary reason for home health care. Based on my clinical findings: the service(s) are medically necessary, support the need for home health care, and the homebound criteria are met.  I certify that this patient has had a face to face encounter by myself.  Ousmane Cintron D.O. - NPI: [id number]

## 2024-03-17 NOTE — PROGRESS NOTES
Assumed care of patient at bedside report from daytime RN. Updated on POC. Patient currently A & O x 4;  up X2; with complaints of acute pain to abdomen. Call light within reach. Whiteboard updated. Fall precautions in place. Bed locked and in lowest position. All questions answered. No other needs indicated at this time.    Bedside report received from off going RN/tech: Martina, assumed care of patient.     Fall Risk Score: HIGH RISK  Fall risk interventions in place: Place yellow fall risk ID band on patient, Provide patient/family education based on risk assessment, Educate patient/family to call staff for assistance when getting out of bed, Place fall precaution signage outside patient door, Place patient in room close to nursing station, Utilize bed/chair fall alarm, and Bed alarm connected correctly  Bed type: Regular (Zan Score less than 17 interventions in place)  Patient on cardiac monitor: Yes  IVF/IV medications: Infusion per MAR (List Med(s)) LR  Oxygen: Room Air  Bedside sitter: Not Applicable   Isolation: Not applicable

## 2024-03-17 NOTE — DISCHARGE PLANNING
Per MD patient refused SNF stating she is at her baseline, that she is always shaky.  She has 2 sons at home to help her.  She has a walker and w/c at home.

## 2024-03-19 ENCOUNTER — PATIENT OUTREACH (OUTPATIENT)
Dept: HEALTH INFORMATION MANAGEMENT | Facility: OTHER | Age: 54
End: 2024-03-19
Payer: MEDICAID

## 2024-03-19 NOTE — PROGRESS NOTES
Community Health Worker Intake    Identified no barriers  Contact information provided to Mary Martinez   Has PCP appointment scheduled for 3/21/2024.  Outpatient assessment completed.  Did the patient receive medications post discharge: Yes    CHW followed up with the patient.   Pt refused SNF when she was in the hospital but was discharged with Home Health services.   Pt states that she has not received a call from any home health agency but has been waiting for a call.   Home Health Referral has not yet been authorized.   Pt states that she was previously in service with [facility] .     Pt declines needing any assistance with any element of [facility].     Pt states that she is receiving support from her sons.     Pt has a PCP appointment on 3/21/2024 which she states her son will provide transportation to.     Plan: CHW will no longer follow as all goals have been met.

## 2024-04-09 ENCOUNTER — APPOINTMENT (OUTPATIENT)
Dept: MEDICAL GROUP | Facility: CLINIC | Age: 54
End: 2024-04-09
Payer: MEDICAID

## 2025-04-25 NOTE — NON-PROVIDER
Denied taking any blood thinners and  any anti- inflammatories medications. Home care education and verbal instruction given to patient and verbalized understanding.Patient had a  ( Lobo / son ). Stop bang score #  1. Dr. Kerr made aware and assessed patient. . Hand off reported to JOHNY Plascencia RN and SUNDEEP soto RN.  
1137 AM   .  Fluids tolerated well. Ice compress applied to the affected area. Reviewed home care instructions,  pain diary sheet information given  and understood by patient.  Dr. Kerr evaluated patient.  
Pt positioned pre-procedure by RN, CST, CNA, XRAY. Pillow placed under feet for support.   
No adenopathy or splenomegaly. No cervical or inguinal lymphadenopathy.

## (undated) DEVICE — SUTURE GENERAL

## (undated) DEVICE — TUBE E-T HI-LO CUFF 7.5MM (10EA/PK)

## (undated) DEVICE — SPONGE GAUZESTER 4 X 4 4PLY - (128PK/CA)

## (undated) DEVICE — MASK, LARYNGEAL AIRWAY #4

## (undated) DEVICE — STAPLER SKIN DISP - (6/BX 10BX/CA) VISISTAT

## (undated) DEVICE — HEAD HOLDER JUNIOR/ADULT

## (undated) DEVICE — BOVIE NEEDLE TIP INSULATD NON-SAFETY 2CM (50/PK)

## (undated) DEVICE — FILM CASSETTE ENDO

## (undated) DEVICE — SUCTION INSTRUMENT YANKAUER BULBOUS TIP W/O VENT (50EA/CA)

## (undated) DEVICE — KIT ANESTHESIA W/CIRCUIT & 3/LT BAG W/FILTER (20EA/CA)

## (undated) DEVICE — PAD LAP STERILE 18 X 18 - (5/PK 40PK/CA)

## (undated) DEVICE — ELECTRODE DUAL RETURN W/ CORD - (50/PK)

## (undated) DEVICE — SUTURE 3-0 VICRYL PLUS SH - 8X 18 INCH (12/BX)

## (undated) DEVICE — SODIUM CHL IRRIGATION 0.9% 1000ML (12EA/CA)

## (undated) DEVICE — SUTURE 2-0 VICRYL PLUS SH - 8 X 18 INCH (12/BX)

## (undated) DEVICE — GLOVE BIOGEL PI INDICATOR SZ 7.0 SURGICAL PF LF - (50/BX 4BX/CA)

## (undated) DEVICE — SENSOR SPO2 NEO LNCS ADHESIVE (20/BX) SEE USER NOTES

## (undated) DEVICE — TUBE E-T HI-LO CUFF 7.0MM (10EA/PK)

## (undated) DEVICE — GLOVE BIOGEL PI INDICATOR SZ 8.0 SURGICAL PF LF -(50/BX 4BX/CA)

## (undated) DEVICE — CHLORAPREP 26 ML APPLICATOR - ORANGE TINT(25/CA)

## (undated) DEVICE — KIT  I.V. START (100EA/CA)

## (undated) DEVICE — SYRINGE SAFETY 10 ML 18 GA X 1 1/2 BLUNT LL (100/BX 4BX/CA)

## (undated) DEVICE — MASK ANESTHESIA ADULT  - (100/CA)

## (undated) DEVICE — KIT CUSTOM PROCEDURE SINGLE FOR ENDO  (15/CA)

## (undated) DEVICE — GLOVE BIOGEL SZ 8.5 SURGICAL PF LTX - (50PR/BX 4BX/CA)

## (undated) DEVICE — TUBE SUCTION YANKAUER  1/4 X 6FT (20EA/CA)"

## (undated) DEVICE — TUBE E-T HI-LO CUFF 6.5MM (10EA/BX)

## (undated) DEVICE — TUBE CONNECT SUCTION CLEAR 120 X 1/4" (50EA/CA)"

## (undated) DEVICE — MANIFOLD NEPTUNE 1 PORT (20/PK)

## (undated) DEVICE — PACK MAJOR BASIN - (2EA/CA)

## (undated) DEVICE — SUTURE 1 PDS II PLUS TP-1 - (12PK/BX)

## (undated) DEVICE — PROTECTOR ULNA NERVE - (36PR/CA)

## (undated) DEVICE — PACK LOWER EXTREMITY - (2/CA)

## (undated) DEVICE — GOWN SURGEONS LARGE - (32/CA)

## (undated) DEVICE — TUBE E-T HI-LO CUFF 8.0MM (10EA/PK)

## (undated) DEVICE — SUTURE 4-0 ETHILON FS-2 18 (36PK/BX)"

## (undated) DEVICE — BAG, SPONGE COUNT 50600

## (undated) DEVICE — BOVIE  BLADE 6 EXTENDED - (50/PK)

## (undated) DEVICE — SET LEADWIRE 5 LEAD BEDSIDE DISPOSABLE ECG (1SET OF 5/EA)

## (undated) DEVICE — BLADE SURGICAL #15 - (50/BX 3BX/CA)

## (undated) DEVICE — CANISTER SUCTION RIGID RED 1500CC (40EA/CA)

## (undated) DEVICE — ELECTRODE 850 FOAM ADHESIVE - HYDROGEL RADIOTRNSPRNT (50/PK)

## (undated) DEVICE — SYRINGE DISP. 60 CC LL - (30/BX, 12BX/CA)**WHEN THESE ARE GONE ORDER #500206**

## (undated) DEVICE — LACTATED RINGERS INJ 1000 ML - (14EA/CA 60CA/PF)

## (undated) DEVICE — ELECTRODE NEEDLE NON-SAFETY 2.8 IN (150EA/CT)

## (undated) DEVICE — WATER IRRIGATION STERILE 1000ML (12EA/CA)

## (undated) DEVICE — SENSOR OXIMETER ADULT SPO2 RD SET (20EA/BX)

## (undated) DEVICE — TUBE CONNECTING SUCTION - CLEAR PLASTIC STERILE 72 IN (50EA/CA)

## (undated) DEVICE — NEPTUNE 4 PORT MANIFOLD - (20/PK)

## (undated) DEVICE — Device

## (undated) DEVICE — SYRINGE SAFETY 5 ML 18 GA X 1-1/2 BLUNT LL (100/BX 4BX/CA)

## (undated) DEVICE — BLOCK

## (undated) DEVICE — GOWN WARMING STANDARD FLEX - (30/CA)

## (undated) DEVICE — SENSOR SPO2 ADULT LNCS ADTX (20/BX) ORDER ITEM #19593

## (undated) DEVICE — EVICEL 5ML - (1/BX)REFRIGERATE UPON RECEIPT

## (undated) DEVICE — TUBING CLEARLINK DUO-VENT - C-FLO (48EA/CA)

## (undated) DEVICE — DRAPE LAPAROTOMY T SHEET - (12EA/CA)

## (undated) DEVICE — SUTURE 3-0 VICRYL PLUS RB-1 - 8 X 18 INCH (12/BX)

## (undated) DEVICE — TOWEL STOP TIMEOUT SAFETY FLAG (40EA/CA)

## (undated) DEVICE — BLADE SAGITTAL SAW 9.4MM X 25.5MM X .4MM FINE TOOTH (1/EA)

## (undated) DEVICE — BITE BLOCK, DISP.

## (undated) DEVICE — SET EXTENSION WITH 2 PORTS (48EA/CA) ***PART #2C8610 IS A SUBSTITUTE*****

## (undated) DEVICE — GLOVE BIOGEL SZ 7 SURGICAL PF LTX - (50PR/BX 4BX/CA)

## (undated) DEVICE — CATHETER IV 20 GA X 1-1/4 ---SURG.& SDS ONLY--- (50EA/BX)

## (undated) DEVICE — GLOVE BIOGEL INDICATOR SZ 8.5 SURGICAL PF LTX - (50/BX 4BX/CA)

## (undated) DEVICE — GLOVE, LITE (PAIR)

## (undated) DEVICE — BITE BLOCK ADULT 60FR (100EA/CA)

## (undated) DEVICE — LEAD SET 6 DISP. EKG NIHON KOHDEN (100EA/CA)

## (undated) DEVICE — FORCEP GRASPING RESCUE COMBO RAT/ALLIGATOR (5EA/BX)

## (undated) DEVICE — DRESSING PETROLEUM GAUZE 5 X 9" (50EA/BX 4BX/CA)"

## (undated) DEVICE — CANISTER SUCTION 3000ML MECHANICAL FILTER AUTO SHUTOFF MEDI-VAC NONSTERILE LF DISP  (40EA/CA)

## (undated) DEVICE — PADDING CAST 4 IN STERILE - 4 X 4 YDS (24/CA)

## (undated) DEVICE — SYRINGE SAFETY 3 ML 18 GA X 1 1/2 BLUNT LL (100/BX 8BX/CA)

## (undated) DEVICE — BLADE SAGITTAL ZS-039 - ZS-039

## (undated) DEVICE — FORCEP RADIAL JAW 4 STANDARD CAPACITY W/NEEDLE 240CM (40EA/BX)

## (undated) DEVICE — MASK PANORAMIC OXYGEN PRO2 (30EA/CA)

## (undated) DEVICE — GOWN SURGICAL XX-LARGE - (28EA/CA) SIRUS NON REINFORCED

## (undated) DEVICE — KIT ROOM DECONTAMINATION

## (undated) DEVICE — MASK WITH FACE SHIELD (25/BX 4BX/CA)

## (undated) DEVICE — CANNULA O2 COMFORT SOFT EAR ADULT 7 FT TUBING (50/CA)

## (undated) DEVICE — TUBE E-T HI-LO CUFF 8.5MM (10EA/PK)

## (undated) DEVICE — GLOVE SZ 6.5 BIOGEL PI MICRO - PF LF (50PR/BX)

## (undated) DEVICE — CONTAINER, SPECIMEN, STERILE

## (undated) DEVICE — CATHETER IV SAFETY 20 GA X 1-1/4 (50/BX)

## (undated) DEVICE — GLOVE SZ 7.5 BIOGEL PI MICRO - PF LF (50PR/BX)

## (undated) DEVICE — NEEDLE 19GA FLEX EUS (5/BX)

## (undated) DEVICE — STOCKINET BIAS 4 IN STERILE - (20/CA)

## (undated) DEVICE — FORCEP RJ4 JUMBO BIOPSY (40EA/BX)

## (undated) DEVICE — DRAPE LARGE 3 QUARTER - (20/CA)

## (undated) DEVICE — SUTURE 2-0 SILK SH C/R ETHICON (12PK/BX)

## (undated) DEVICE — DRAPE MAYO STAND - (30/CA)

## (undated) DEVICE — SLEEVE, VASO, THIGH, MED

## (undated) DEVICE — LEAD SET 6 DISP. EKG NIHON KOHDEN

## (undated) DEVICE — TRAY CATHETER FOLEY URINE METER W/STATLOCK 350ML (10EA/CA)

## (undated) DEVICE — MANIFOLD NEPTUNE 1 PORT

## (undated) DEVICE — BUTTON ENDOSCOPY DISPOSABLE

## (undated) DEVICE — BLADE SAGITTAL ZS-032 - .

## (undated) DEVICE — DETERGENT RENUZYME PLUS 10 OZ PACKET (50/BX)

## (undated) DEVICE — RESERVOIR SUCTION 100 CC - SILICONE (20EA/CA)

## (undated) DEVICE — SUTURE 1 PDS-2 PLUS CTX - (24/BX)

## (undated) DEVICE — SPLINT X-FAST SETTING 3 X 15 (50EA/BX 12BX/CA) SPECIALIST""

## (undated) DEVICE — SPONGE GAUZE NON-STERILE 4X4 - (2000/CA 10PK/CA)

## (undated) DEVICE — NEEDLE NON SAFETY 25 GA X 1 1/2 IN HYPO (100EA/BX)

## (undated) DEVICE — SUTURE 2-0 ETHIBOND CT-2 (12PK/BX)

## (undated) DEVICE — CATHETER IV SAFETY 22 GA X 1 (50EA/BX)

## (undated) DEVICE — PORT AUXILLARY WATER (50EA/BX)

## (undated) DEVICE — TOWELS CLOTH SURGICAL - (4/PK 20PK/CA)

## (undated) DEVICE — TOURNIQUET, STERILE 18 (RED)